# Patient Record
Sex: FEMALE | Race: WHITE | NOT HISPANIC OR LATINO | Employment: OTHER | URBAN - METROPOLITAN AREA
[De-identification: names, ages, dates, MRNs, and addresses within clinical notes are randomized per-mention and may not be internally consistent; named-entity substitution may affect disease eponyms.]

---

## 2017-03-13 DIAGNOSIS — I10 ESSENTIAL HYPERTENSION WITH GOAL BLOOD PRESSURE LESS THAN 140/90: ICD-10-CM

## 2017-03-13 RX ORDER — LISINOPRIL/HYDROCHLOROTHIAZIDE 10-12.5 MG
TABLET ORAL
Qty: 90 TABLET | Refills: 1 | Status: SHIPPED | OUTPATIENT
Start: 2017-03-13 | End: 2017-09-27

## 2017-03-13 NOTE — TELEPHONE ENCOUNTER
Prescription approved per [facility].  Pretty Everett RN

## 2017-03-13 NOTE — TELEPHONE ENCOUNTER
LISINOPRIL-HYDROCHLORO 10-12.5 TABS      Last Written Prescription Date: 06/29/2016  Last Fill Quantity: 90, # refills: 1  Last Office Visit with [facility], P or [facility] prescribing provider: 11/02/2016       Potassium   Date Value Ref Range Status   06/29/2016 4.1 3.4 - 5.3 mmol/L Final     Creatinine   Date Value Ref Range Status   06/29/2016 0.56 0.52 - 1.04 mg/dL Final     BP Readings from Last 3 Encounters:   12/10/16 94/72   11/02/16 132/82   08/03/16 120/74

## 2017-05-03 DIAGNOSIS — I10 ESSENTIAL HYPERTENSION WITH GOAL BLOOD PRESSURE LESS THAN 140/90: ICD-10-CM

## 2017-05-03 RX ORDER — LISINOPRIL/HYDROCHLOROTHIAZIDE 10-12.5 MG
TABLET ORAL
Qty: 90 TABLET | Refills: 1 | OUTPATIENT
Start: 2017-05-03

## 2017-06-13 ENCOUNTER — ALLIED HEALTH/NURSE VISIT (OUTPATIENT)
Dept: FAMILY MEDICINE | Facility: CLINIC | Age: 52
End: 2017-06-13
Payer: COMMERCIAL

## 2017-06-13 ENCOUNTER — TELEPHONE (OUTPATIENT)
Dept: FAMILY MEDICINE | Facility: CLINIC | Age: 52
End: 2017-06-13

## 2017-06-13 VITALS — DIASTOLIC BLOOD PRESSURE: 84 MMHG | SYSTOLIC BLOOD PRESSURE: 138 MMHG

## 2017-06-13 DIAGNOSIS — E80.6 ACQUIRED HYPERBILIRUBINEMIA: ICD-10-CM

## 2017-06-13 DIAGNOSIS — Z13.6 CARDIOVASCULAR SCREENING; LDL GOAL LESS THAN 130: ICD-10-CM

## 2017-06-13 DIAGNOSIS — D50.9 IRON DEFICIENCY ANEMIA, UNSPECIFIED IRON DEFICIENCY ANEMIA TYPE: ICD-10-CM

## 2017-06-13 DIAGNOSIS — K70.0 ALCOHOLIC FATTY LIVER: ICD-10-CM

## 2017-06-13 DIAGNOSIS — R74.8 ABNORMAL LIVER ENZYMES: Chronic | ICD-10-CM

## 2017-06-13 DIAGNOSIS — I10 ESSENTIAL HYPERTENSION WITH GOAL BLOOD PRESSURE LESS THAN 140/90: Primary | ICD-10-CM

## 2017-06-13 DIAGNOSIS — F10.10 AA (ALCOHOL ABUSE): ICD-10-CM

## 2017-06-13 PROCEDURE — 99207 ZZC NO CHARGE NURSE ONLY: CPT | Performed by: FAMILY MEDICINE

## 2017-06-13 RX ORDER — LISINOPRIL 10 MG/1
10 TABLET ORAL DAILY
Qty: 30 TABLET | Refills: 1 | Status: SHIPPED | OUTPATIENT
Start: 2017-06-13 | End: 2017-09-27

## 2017-06-13 NOTE — MR AVS SNAPSHOT
After Visit Summary   6/13/2017    Abiola Matute    MRN: [id number]           Patient Information     Date Of Birth          1965        Visit Information        Provider Department      6/13/2017 5:42 PM Philly Goncalves MD [facility]        Today's Diagnoses     Essential hypertension with goal blood pressure less than 140/90    -  1       Follow-ups after your visit        Who to contact     If you have questions or need follow up information about today's clinic visit or your schedule please contact [facility] directly at [phone].  Normal or non-critical lab and imaging results will be communicated to you by Advebshart, letter or phone within 4 business days after the clinic has received the results. If you do not hear from us within 7 days, please contact the clinic through Advebshart or phone. If you have a critical or abnormal lab result, we will notify you by phone as soon as possible.  Submit refill requests through August or call your pharmacy and they will forward the refill request to us. Please allow 3 business days for your refill to be completed.          Additional Information About Your Visit        MyChart Information     August gives you secure access to your electronic health record. If you see a primary care provider, you can also send messages to your care team and make appointments. If you have questions, please call your primary care clinic.  If you do not have a primary care provider, please call [phone] and they will assist you.        Care EveryWhere ID     This is your Care EveryWhere ID. This could be used by other organizations to access your [place] medical records  [id number]        Your Vitals Were     Last Period                   05/31/2006            Blood Pressure from Last 3 Encounters:   06/13/17 138/84   12/10/16 94/72   11/02/16 132/82    Weight from Last 3 Encounters:   11/02/16 193 lb 1 oz (87.6 kg)    08/03/16 192 lb (87.1 kg)   07/29/16 191 lb (86.6 kg)              Today, you had the following     No orders found for display       Primary Care Provider Office Phone # Fax #    Philly Goncalves [phone]       [facility] [address] [address]        Thank you!     Thank you for choosing [facility]  for your care. Our goal is always to provide you with excellent care. Hearing back from our patients is one way we can continue to improve our services. Please take a few minutes to complete the written survey that you may receive in the mail after your visit with us. Thank you!             Your Updated Medication List - Protect others around you: Learn how to safely use, store and throw away your medicines at www.disposemymeds.org.          This list is accurate as of: 6/13/17  5:44 PM.  Always use your most recent med list.                   Brand Name Dispense Instructions for use    CALCIUM 500 PO      Take 1 tablet by mouth daily       cephALEXin 500 MG capsule    KEFLEX    10 capsule    Take 1 capsule (500 mg) by mouth 2 times daily       cholecalciferol 1000 UNIT tablet    vitamin D     Take 1 tablet by mouth daily.       EPINEPHrine 0.3 MG/0.3ML injection     2 each    Inject 0.3 mLs (0.3 mg) into the muscle once as needed       lisinopril-hydrochlorothiazide 10-12.5 MG per tablet    PRINZIDE/ZESTORETIC    90 tablet    TAKE ONE TABLET BY MOUTH EVERY DAY       LORazepam 0.5 MG tablet    ATIVAN    10 tablet    Take 1 tablet by mouth. 1-2 tabs 30-60 minutes prior to exam/procedure/flight       MULTIVITAMIN & MINERAL PO      Take  by mouth daily.          Aldair Kennedy

## 2017-06-13 NOTE — NURSING NOTE
Abiola Matute is enrolled/participating in the retail pharmacy Blood Pressure Goals Achievement Program (BPGAP).  Abiola Matute was evaluated at [facility] on June 13, 2017 at which time her blood pressure was:    BP Readings from Last 3 Encounters:   06/13/17 138/84   12/10/16 94/72   11/02/16 132/82     Reviewed lifestyle modifications for blood pressure control and reduction: including making healthy food choices, managing weight, getting regular exercise, smoking cessation, reducing alcohol consumption, monitoring blood pressure regularly.     Abiola Matute is not experiencing symptoms.    Follow-Up: BP is at goal of < 140/90mmHg (patient 18+ years of age with or without diabetes).  Recommended follow-up at pharmacy in 6 months.     Recommendation to Provider: Continue current regimen    Abiola Matute was evaluated for enrollment into the PGEN study today.    Patient eligible for enrollment:  No  Patient interested in enrollment:  No    Completed by: Thank you,  Joyce Rivas RPh, Mgr [facility] [place] [phone]

## 2017-06-14 NOTE — TELEPHONE ENCOUNTER
Have pt then stay with her current dosage and lisinopril 10mg/hctz12.5 take daily  - Recheck your blood pressure in our pharmacy in 1 week or sooner if needed.  Have pharmacy send me their note.      Needs to be on her meds and taking them for best interpretation.  Hold on the additional 10mg lisinopril for now.

## 2017-06-14 NOTE — TELEPHONE ENCOUNTER
BP Readings from Last 6 Encounters:   06/13/17 138/84   12/10/16 94/72   11/02/16 132/82   08/03/16 120/74   07/29/16 124/80   06/29/16 132/82     bp not entirely well controlled. Recommend increasing lisinopril to 20mg and keeping the HCTZ to 12.5mg.    Add 10mg lisinopril to pt's current lisinopril 10mg/hctz 12.5mg combination and Recheck your blood pressure in our pharmacy in 1 week or sooner if needed.  Have pharmacy send me their note again.     Also bp may be up secondary to alcohol overuse.  Please ask pt about current alcohol use.  Also Pt never came back in for liver functions last summer - I've re-ordered them. Please assist pt in making appt to be seen for lab only visit and also assist pt in making appt for annual exam as well.

## 2017-06-15 NOTE — TELEPHONE ENCOUNTER
Attempted to call patient.  Received patients voicemail.  Left a detailed message with details below.  Advised to call back and speak with any triage nurse with any questions or concerns.     Nereida Samson RN    [facility]

## 2017-08-17 ENCOUNTER — TRANSFERRED RECORDS (OUTPATIENT)
Dept: HEALTH INFORMATION MANAGEMENT | Facility: CLINIC | Age: 52
End: 2017-08-17

## 2017-09-27 ENCOUNTER — OFFICE VISIT (OUTPATIENT)
Dept: FAMILY MEDICINE | Facility: CLINIC | Age: 52
End: 2017-09-27
Payer: COMMERCIAL

## 2017-09-27 ENCOUNTER — TELEPHONE (OUTPATIENT)
Dept: FAMILY MEDICINE | Facility: CLINIC | Age: 52
End: 2017-09-27

## 2017-09-27 VITALS
DIASTOLIC BLOOD PRESSURE: 88 MMHG | TEMPERATURE: 98.6 F | BODY MASS INDEX: 33.11 KG/M2 | WEIGHT: 206 LBS | HEART RATE: 89 BPM | OXYGEN SATURATION: 100 % | SYSTOLIC BLOOD PRESSURE: 136 MMHG | HEIGHT: 66 IN

## 2017-09-27 DIAGNOSIS — Z12.11 SCREEN FOR COLON CANCER: ICD-10-CM

## 2017-09-27 DIAGNOSIS — Z23 NEED FOR PROPHYLACTIC VACCINATION AND INOCULATION AGAINST INFLUENZA: ICD-10-CM

## 2017-09-27 DIAGNOSIS — Z13.6 CARDIOVASCULAR SCREENING; LDL GOAL LESS THAN 130: ICD-10-CM

## 2017-09-27 DIAGNOSIS — Z00.00 ENCOUNTER FOR ROUTINE ADULT HEALTH EXAMINATION WITHOUT ABNORMAL FINDINGS: Primary | ICD-10-CM

## 2017-09-27 DIAGNOSIS — Z12.31 VISIT FOR SCREENING MAMMOGRAM: ICD-10-CM

## 2017-09-27 DIAGNOSIS — I10 ESSENTIAL HYPERTENSION WITH GOAL BLOOD PRESSURE LESS THAN 140/90: ICD-10-CM

## 2017-09-27 LAB
BASOPHILS # BLD AUTO: 0 10E9/L (ref 0–0.2)
BASOPHILS NFR BLD AUTO: 0.5 %
DIFFERENTIAL METHOD BLD: ABNORMAL
EOSINOPHIL # BLD AUTO: 0.1 10E9/L (ref 0–0.7)
EOSINOPHIL NFR BLD AUTO: 2.4 %
ERYTHROCYTE [DISTWIDTH] IN BLOOD BY AUTOMATED COUNT: 13.3 % (ref 10–15)
HCT VFR BLD AUTO: 38.4 % (ref 35–47)
HGB BLD-MCNC: 13.3 G/DL (ref 11.7–15.7)
LYMPHOCYTES # BLD AUTO: 1.3 10E9/L (ref 0.8–5.3)
LYMPHOCYTES NFR BLD AUTO: 21.5 %
MCH RBC QN AUTO: 36.1 PG (ref 26.5–33)
MCHC RBC AUTO-ENTMCNC: 34.6 G/DL (ref 31.5–36.5)
MCV RBC AUTO: 104 FL (ref 78–100)
MONOCYTES # BLD AUTO: 0.5 10E9/L (ref 0–1.3)
MONOCYTES NFR BLD AUTO: 8.7 %
NEUTROPHILS # BLD AUTO: 3.9 10E9/L (ref 1.6–8.3)
NEUTROPHILS NFR BLD AUTO: 66.9 %
PLATELET # BLD AUTO: 71 10E9/L (ref 150–450)
RBC # BLD AUTO: 3.68 10E12/L (ref 3.8–5.2)
WBC # BLD AUTO: 5.9 10E9/L (ref 4–11)

## 2017-09-27 PROCEDURE — 80050 GENERAL HEALTH PANEL: CPT | Performed by: PHYSICIAN ASSISTANT

## 2017-09-27 PROCEDURE — 99396 PREV VISIT EST AGE 40-64: CPT | Performed by: PHYSICIAN ASSISTANT

## 2017-09-27 PROCEDURE — 83036 HEMOGLOBIN GLYCOSYLATED A1C: CPT | Performed by: PHYSICIAN ASSISTANT

## 2017-09-27 PROCEDURE — 82043 UR ALBUMIN QUANTITATIVE: CPT | Performed by: PHYSICIAN ASSISTANT

## 2017-09-27 PROCEDURE — 36415 COLL VENOUS BLD VENIPUNCTURE: CPT | Performed by: PHYSICIAN ASSISTANT

## 2017-09-27 PROCEDURE — 80061 LIPID PANEL: CPT | Performed by: PHYSICIAN ASSISTANT

## 2017-09-27 RX ORDER — LISINOPRIL 10 MG/1
10 TABLET ORAL DAILY
Qty: 90 TABLET | Refills: 1 | Status: SHIPPED | OUTPATIENT
Start: 2017-09-27 | End: 2018-02-26

## 2017-09-27 NOTE — NURSING NOTE
"Chief Complaint   Patient presents with     Physical       Initial /88 (BP Location: Right arm, Patient Position: Chair, Cuff Size: Adult Large)  Pulse 89  Temp 98.6  F (37  C) (Oral)  Ht 5' 6\" (1.676 m)  Wt 206 lb (93.4 kg)  LMP 05/31/2006  SpO2 100%  Breastfeeding? No  BMI 33.25 kg/m2 Estimated body mass index is 33.25 kg/(m^2) as calculated from the following:    Height as of this encounter: 5' 6\" (1.676 m).    Weight as of this encounter: 206 lb (93.4 kg).  Medication Reconciliation: complete   Csaba Mlnarik CMA    "

## 2017-09-27 NOTE — TELEPHONE ENCOUNTER
Date Forms was received: September 27, 2017    Forms received by: Patient Drop Off    Last office visit: 09/27/2017    Purpose of Form:  Biometric forms    When the form is due:  asap    How the form needs to be returned for patient:  Patient  -- call pt - needs to sign yet    Form currently placed  Csaba's desk - [facility]

## 2017-09-27 NOTE — PROGRESS NOTES
SUBJECTIVE:   CC: Abiola Matute is an 52 year old woman who presents for preventive health visit.     Healthy Habits:    Do you get at least three servings of calcium containing foods daily (dairy, green leafy vegetables, etc.)? yes    Amount of exercise or daily activities, outside of work: 3-4 day(s) per week - 20-30 minute walk    Problems taking medications regularly No    Medication side effects: No    Have you had an eye exam in the past two years? yes    Do you see a dentist twice per year? yes    Do you have sleep apnea, excessive snoring or daytime drowsiness?no        Hypertension Follow-up      Outpatient blood pressures are being checked at [facility].  Results are 138/84.    Low Salt Diet: no added salt        PHQ-2  0-0  Today's PHQ-2 Score: PHQ-2 ( 1999 Pf[employer]) 8/3/2016 6/27/2016   Q1: Little interest or pleasure in doing things 0 -   Q2: Feeling down, depressed or hopeless 0 -   PHQ-2 Score 0 -   Q1: Little interest or pleasure in doing things - Not at all   Q2: Feeling down, depressed or hopeless - Not at all   PHQ-2 Score - 0         Abuse: Current or Past(Physical, Sexual or Emotional)- No  Do you feel safe in your environment - Yes  Social History   Substance Use Topics     Smoking status: Never Smoker     Smokeless tobacco: Never Used     Alcohol use 0.0 oz/week     0 Standard drinks or equivalent per week      Comment: occ.          Standardized Alcohol Screening Questionnaire  AUDIT   Questions 0 1 2 3 4 Score   1. How often do you have a drink  containing alcohol? Never Monthly or less 2 to 4  times a  month 2 to 3  times a  week 4 or more  times a  week  3   2. How many drinks containing alcohol  do you have on a typical day when you are drinking? 1 or 2 3 or 4 5 or 6 7 to 9 10 or more  1   3. How often do you have more than five  or more drinks on one occasion? Never Less  than  monthly Monthly Weekly Daily or  almost  daily  1   4. How often during the last year have  you  found that you were not able to stop drinking once you had started? Never Less  than  monthly Monthly Weekly Daily or  almost  daily  0   5. How often during the last year have  you failed to do what was normally expected of you because of drinking? Never Less  than  monthly Monthly Weekly Daily or  almost  daily  0   6. How often during the last year have  you needed a first drink in the morning to get yourself going after a heavy drinking session? Never Less  than  monthly Monthly Weekly Daily or  almost  daily  0   7. How often during the last year have you had a feeling of guilt or remorse after drinking? Never Less  than  monthly Monthly Weekly Daily or  almost  daily  1   8. How often during the last year have  you been unable to remember what happened the night before because of your drinking? Never Less  than  monthly Monthly Weekly Daily or  almost  daily  0   9. Have you or someone else been  injured because of your drinking? No  Yes, but not in the last year  Yes,  during the  last year  0   10. Has a relative, friend, doctor or other health care worker been concerned about your drinking or suggested you cut down? No  Yes, but not in the last year  Yes,  during the  last year  0   Total  6   Scoring: A score of 7 for adult men is an indication of hazardous drinking (risk for physical or physiological harm); a score of 8 or more is an indication of an alcohol use disorder. A score of 5 or more for adult women  is an indication of hazardous drinking or an alcohol use disorder.     Patient reports that she drinks about once on the weekend and then about one day during the week.      Reviewed orders with patient.  Reviewed health maintenance and updated orders accordingly - Yes  Labs reviewed in [facility]    Patient over age 50, mutual decision to screen reflected in health maintenance.  Patient gets mammograms every year.  She will have this scheduled today.        Pertinent mammograms are reviewed under the  "imaging tab.  History of abnormal Pap smear: Status post benign hysterectomy. Health Maintenance and Surgical History updated.    Reviewed and updated as needed this visit by clinical staff         Reviewed and updated as needed this visit by Provider              ROS:  C: NEGATIVE for fever, chills, change in weight  I: NEGATIVE for worrisome rashes, moles or lesions  E: NEGATIVE for vision changes or irritation  ENT: NEGATIVE for ear, mouth and throat problems  R: NEGATIVE for significant cough or SOB  B: NEGATIVE for masses, tenderness or discharge  CV: NEGATIVE for chest pain, palpitations or peripheral edema  GI: NEGATIVE for nausea, abdominal pain, heartburn, or change in bowel habits  : NEGATIVE for unusual urinary or vaginal symptoms. No vaginal bleeding.  M: NEGATIVE for significant arthralgias or myalgia  N: NEGATIVE for weakness, dizziness or paresthesias  P: NEGATIVE for changes in mood or affect     OBJECTIVE:   /88 (BP Location: Right arm, Patient Position: Chair, Cuff Size: Adult Large)  Pulse 89  Temp 98.6  F (37  C) (Oral)  Ht 5' 6\" (1.676 m)  Wt 206 lb (93.4 kg)  LMP 05/31/2006  SpO2 100%  Breastfeeding? No  BMI 33.25 kg/m2  EXAM:  GENERAL: healthy, alert and no distress  EYES: Eyes grossly normal to inspection, PERRL and conjunctivae and sclerae normal  HENT: ear canals and TM's normal, nose and mouth without ulcers or lesions  NECK: no adenopathy, no asymmetry, masses, or scars and thyroid normal to palpation  RESP: lungs clear to auscultation - no rales, rhonchi or wheezes  BREAST: normal without masses, tenderness or nipple discharge and no palpable axillary masses or adenopathy  CV: regular rate and rhythm, normal S1 S2, no S3 or S4, no murmur, click or rub, no peripheral edema and peripheral pulses strong  ABDOMEN: soft, nontender, no hepatosplenomegaly, no masses and bowel sounds normal  MS: no gross musculoskeletal defects noted, no edema  SKIN: no suspicious lesions or " "rashes  NEURO: Normal strength and tone, mentation intact and speech normal  PSYCH: mentation appears normal, affect normal/bright    ASSESSMENT/PLAN:       ICD-10-CM    1. Encounter for routine adult health examination without abnormal findings Z00.00 Albumin Random Urine Quantitative with Creat Ratio     Lipid panel reflex to direct LDL     CBC with platelets and differential     Comprehensive metabolic panel (BMP + Alb, Alk Phos, ALT, AST, Total. Bili, TP)     TSH with free T4 reflex     Hemoglobin A1c   2. Screen for colon cancer Z12.11 Fecal colorectal cancer screen FIT - Future (S+30)   3. Visit for screening mammogram Z12.31 MA SCREENING DIGITAL BILAT - Future  (s+30)   4. Need for prophylactic vaccination and inoculation against influenza Z23    5. CARDIOVASCULAR SCREENING; LDL GOAL LESS THAN 130 Z13.6    6. Essential hypertension with goal blood pressure less than 140/90 I10 Albumin Random Urine Quantitative with Creat Ratio     lisinopril (PRINIVIL/ZESTRIL) 10 MG tablet       COUNSELING:   Reviewed preventive health counseling, as reflected in patient instructions         reports that she has never smoked. She has never used smokeless tobacco.    Estimated body mass index is 31.16 kg/(m^2) as calculated from the following:    Height as of 11/2/16: 5' 6\" (1.676 m).    Weight as of 11/2/16: 193 lb 1 oz (87.6 kg).   Weight management plan: Discussed healthy diet and exercise guidelines and patient will follow up in 12 months in clinic to re-evaluate.    Counseling Resources:  ATP IV Guidelines  Pooled Cohorts Equation Calculator  Breast Cancer Risk Calculator  FRAX Risk Assessment  ICSI Preventive Guidelines  Dietary Guidelines for Americans, 2010  USDA's MyPlate  ASA Prophylaxis  Lung CA Screening    Amber Castro PA-C  [facility]  "

## 2017-09-27 NOTE — MR AVS SNAPSHOT
After Visit Summary   9/27/2017    Abiola Matute    MRN: [id number]           Patient Information     Date Of Birth          1965        Visit Information        Provider Department      9/27/2017 3:20 PM Amber Castro PA-C [facility] [place]        Today's Diagnoses     Encounter for routine adult health examination without abnormal findings    -  1    Screen for colon cancer        Visit for screening mammogram        Need for prophylactic vaccination and inoculation against influenza        CARDIOVASCULAR SCREENING; LDL GOAL LESS THAN 130        Essential hypertension with goal blood pressure less than 140/90          Care Instructions      Preventive Health Recommendations  Female Ages 50 - 64    Yearly exam: See your health care provider every year in order to  o Review health changes.   o Discuss preventive care.    o Review your medicines if your doctor has prescribed any.      Get a Pap test every three years (unless you have an abnormal result and your provider advises testing more often).    If you get Pap tests with HPV test, you only need to test every 5 years, unless you have an abnormal result.     You do not need a Pap test if your uterus was removed (hysterectomy) and you have not had cancer.    You should be tested each year for STDs (sexually transmitted diseases) if you're at risk.     Have a mammogram every 1 to 2 years.    Have a colonoscopy at age 50, or have a yearly FIT test (stool test). These exams screen for colon cancer.      Have a cholesterol test every 5 years, or more often if advised.    Have a diabetes test (fasting glucose) every three years. If you are at risk for diabetes, you should have this test more often.     If you are at risk for osteoporosis (brittle bone disease), think about having a bone density scan (DEXA).    Shots: Get a flu shot each year. Get a tetanus shot every 10 years.    Nutrition:     Eat at least 5 servings of fruits and  vegetables each day.    Eat whole-grain bread, whole-wheat pasta and brown rice instead of white grains and rice.    Talk to your provider about Calcium and Vitamin D.     Lifestyle    Exercise at least 150 minutes a week (30 minutes a day, 5 days a week). This will help you control your weight and prevent disease.    Limit alcohol to one drink per day.    No smoking.     Wear sunscreen to prevent skin cancer.     See your dentist every six months for an exam and cleaning.    See your eye doctor every 1 to 2 years.            Follow-ups after your visit        Future tests that were ordered for you today     Open Future Orders        Priority Expected Expires Ordered    MA SCREENING DIGITAL BILAT - Future  (s+30) Routine  9/27/2018 9/27/2017    Fecal colorectal cancer screen FIT - Future (S+30) Routine 10/18/2017 10/27/2017 9/27/2017            Who to contact     If you have questions or need follow up information about today's clinic visit or your schedule please contact [facility] directly at [phone].  Normal or non-critical lab and imaging results will be communicated to you by finalsitehart, letter or phone within 4 business days after the clinic has received the results. If you do not hear from us within 7 days, please contact the clinic through Greytip Software or phone. If you have a critical or abnormal lab result, we will notify you by phone as soon as possible.  Submit refill requests through Greytip Software or call your pharmacy and they will forward the refill request to us. Please allow 3 business days for your refill to be completed.          Additional Information About Your Visit        Greytip Software Information     Greyt[employer] Software gives you secure access to your electronic health record. If you see a primary care provider, you can also send messages to your care team and make appointments. If you have questions, please call your primary care clinic.  If you do not have a primary care provider, please call  "[phone] and they will assist you.        Care EveryWhere ID     This is your Care EveryWhere ID. This could be used by other organizations to access your [place] medical records  [id number]        Your Vitals Were     Pulse Temperature Height Last Period Pulse Oximetry Breastfeeding?    89 98.6  F (37  C) (Oral) 5' 6\" (1.676 m) 05/31/2006 100% No    BMI (Body Mass Index)                   33.25 kg/m2            Blood Pressure from Last 3 Encounters:   09/27/17 136/88   06/13/17 138/84   12/10/16 94/72    Weight from Last 3 Encounters:   09/27/17 206 lb (93.4 kg)   11/02/16 193 lb 1 oz (87.6 kg)   08/03/16 192 lb (87.1 kg)              We Performed the Following     Albumin Random Urine Quantitative with Creat Ratio     CBC with platelets and differential     Comprehensive metabolic panel (BMP + Alb, Alk Phos, ALT, AST, Total. Bili, TP)     Lipid panel reflex to direct LDL     TSH with free T4 reflex          Where to get your medicines      These medications were sent to [facility] [place] [address]     Phone:  [phone]     lisinopril 10 MG tablet          Primary Care Provider Office Phone # Fax #    Philly Goncalves [phone]       [address] [place] [address]        Equal Access to Services     WES CHAN AH: Hadii leydi morrisseyo Sotoryali, waaxda luqadaha, qaybta kaalmada adeegyada, hudson ogden. So [facility] [phone].    ATENCIÓN: Si habla español, tiene a knight disposición servicios gratuitos de asistencia lingüística. Riley al [phone].    We comply with applicable federal civil rights laws and [place] laws. We do not discriminate on the basis of race, color, national origin, age, disability sex, sexual orientation or gender identity.            Thank you!     Thank you for choosing [facility]  for your care. Our goal is " always to provide you with excellent care. Hearing back from our patients is one way we can continue to improve our services. Please take a few minutes to complete the written survey that you may receive in the mail after your visit with us. Thank you!             Your Updated Medication List - Protect others around you: Learn how to safely use, store and throw away your medicines at www.disposemymeds.org.          This list is accurate as of: 9/27/17  3:56 PM.  Always use your most recent med list.                   Brand Name Dispense Instructions for use Diagnosis    CALCIUM 500 PO      Take 1 tablet by mouth daily    Screening for osteoporosis       cholecalciferol 1000 UNIT tablet    vitamin D     Take 1 tablet by mouth daily.    Screening for osteoporosis       lisinopril 10 MG tablet    PRINIVIL/ZESTRIL    90 tablet    Take 1 tablet (10 mg) by mouth daily    Essential hypertension with goal blood pressure less than 140/90       LORazepam 0.5 MG tablet    ATIVAN    10 tablet    Take 1 tablet by mouth. 1-2 tabs 30-60 minutes prior to exam/procedure/flight    Claustrophobia       MULTIVITAMIN & MINERAL PO      Take  by mouth daily.    Routine general medical examination at a health care facility, Iron deficiency anemia, unspecified

## 2017-09-28 LAB
ALBUMIN SERPL-MCNC: 3.4 G/DL (ref 3.4–5)
ALP SERPL-CCNC: 158 U/L (ref 40–150)
ALT SERPL W P-5'-P-CCNC: 64 U/L (ref 0–50)
ANION GAP SERPL CALCULATED.3IONS-SCNC: 13 MMOL/L (ref 3–14)
AST SERPL W P-5'-P-CCNC: 128 U/L (ref 0–45)
BILIRUB SERPL-MCNC: 3.4 MG/DL (ref 0.2–1.3)
BUN SERPL-MCNC: 7 MG/DL (ref 7–30)
CALCIUM SERPL-MCNC: 8.7 MG/DL (ref 8.5–10.1)
CHLORIDE SERPL-SCNC: 91 MMOL/L (ref 94–109)
CHOLEST SERPL-MCNC: 119 MG/DL
CO2 SERPL-SCNC: 22 MMOL/L (ref 20–32)
CREAT SERPL-MCNC: 0.55 MG/DL (ref 0.52–1.04)
CREAT UR-MCNC: 20 MG/DL
GFR SERPL CREATININE-BSD FRML MDRD: >90 ML/MIN/1.7M2
GLUCOSE SERPL-MCNC: 89 MG/DL (ref 70–99)
HDLC SERPL-MCNC: 43 MG/DL
LDLC SERPL CALC-MCNC: 62 MG/DL
MICROALBUMIN UR-MCNC: <5 MG/L
MICROALBUMIN/CREAT UR: NORMAL MG/G CR (ref 0–25)
NONHDLC SERPL-MCNC: 76 MG/DL
POTASSIUM SERPL-SCNC: 4.1 MMOL/L (ref 3.4–5.3)
PROT SERPL-MCNC: 8.3 G/DL (ref 6.8–8.8)
SODIUM SERPL-SCNC: 126 MMOL/L (ref 133–144)
TRIGL SERPL-MCNC: 70 MG/DL
TSH SERPL DL<=0.005 MIU/L-ACNC: 2 MU/L (ref 0.4–4)

## 2017-09-29 DIAGNOSIS — E87.1 SODIUM BLOOD DECREASED: ICD-10-CM

## 2017-09-29 DIAGNOSIS — R74.8 ELEVATED LIVER ENZYMES: Primary | ICD-10-CM

## 2017-09-29 LAB — HBA1C MFR BLD: 4.6 % (ref 4.3–6)

## 2017-09-29 NOTE — TELEPHONE ENCOUNTER
Needed Hgb A1c to complete form -- will use tube drawn from 09/27/2017.    Pt needs to sign form. Pt will stop in to sign. Brought form to  -- pt will leave for us to complete and fax for her.    Heber Wynn CMA

## 2017-09-29 NOTE — TELEPHONE ENCOUNTER
Form signed by pt and provider. Added A1c.    Faxed to [phone] and sent to [email or web address].    Heber Wynn CMA

## 2017-09-30 NOTE — PROGRESS NOTES
Note to staff: Please call the patient to explain results.    The results from your recent lab work show that the liver enzymes are elevated, as they have been in the past as well.  This can often be due to alcohol intake, and therefore limiting alcohol intake to less than 7 drinks a week or less is recommended.    We should re-check a liver panel in about one week.       Also, the sodium level is low.  Low sodium can be happen when you are over hydrated or with vomiting, and therefore we should check this lab again when you are not fasting and are well nourished.  Low sodium can be damaging to the brain and cause fluid retention in your legs and abdomen.  Please be sure you are not drinking more than 60 oz of free water in a day as too much water can contribute to lower sodium levels as well.    -TSH (thyroid stimulating hormone) level is normal which indicates normal thyroid function.  -A1C (diabetic test) is normal and indicates that your blood sugar has been in a normal range the last 3 months.  -Microalbumin (urine protein) test is normal.  ADVISE: recheck annually    Please be sure to make a lab only appointment within one week for blood work and a urine sample.  We will re-check the labs described above.      Please followup sooner if needed.      Thank you for choosing [facility] for your health care needs,      Amber Castro PA-C

## 2017-10-11 ENCOUNTER — RADIANT APPOINTMENT (OUTPATIENT)
Dept: MAMMOGRAPHY | Facility: CLINIC | Age: 52
End: 2017-10-11
Payer: COMMERCIAL

## 2017-10-11 DIAGNOSIS — Z12.31 VISIT FOR SCREENING MAMMOGRAM: ICD-10-CM

## 2017-10-11 PROCEDURE — G0202 SCR MAMMO BI INCL CAD: HCPCS | Mod: TC

## 2017-10-13 NOTE — PROGRESS NOTES
Please call pt with results below:     Need to schedule diagnostic mammo and US - Give number for [facility].

## 2017-10-19 ENCOUNTER — HOSPITAL ENCOUNTER (OUTPATIENT)
Dept: MAMMOGRAPHY | Facility: CLINIC | Age: 52
Discharge: HOME | End: 2017-10-19
Attending: FAMILY MEDICINE | Admitting: FAMILY MEDICINE
Payer: COMMERCIAL

## 2017-10-19 ENCOUNTER — HOSPITAL ENCOUNTER (OUTPATIENT)
Dept: ULTRASOUND IMAGING | Facility: CLINIC | Age: 52
End: 2017-10-19
Attending: FAMILY MEDICINE
Payer: COMMERCIAL

## 2017-10-19 DIAGNOSIS — R92.8 ABNORMAL MAMMOGRAM: ICD-10-CM

## 2017-10-19 PROCEDURE — G0279 TOMOSYNTHESIS, MAMMO: HCPCS

## 2017-10-19 PROCEDURE — 76642 ULTRASOUND BREAST LIMITED: CPT | Mod: LT

## 2017-11-02 DIAGNOSIS — E87.1 SODIUM BLOOD DECREASED: ICD-10-CM

## 2017-11-02 DIAGNOSIS — R74.8 ELEVATED LIVER ENZYMES: ICD-10-CM

## 2017-11-02 PROCEDURE — 82977 ASSAY OF GGT: CPT | Performed by: PHYSICIAN ASSISTANT

## 2017-11-02 PROCEDURE — 36415 COLL VENOUS BLD VENIPUNCTURE: CPT | Performed by: PHYSICIAN ASSISTANT

## 2017-11-02 PROCEDURE — 80048 BASIC METABOLIC PNL TOTAL CA: CPT | Performed by: PHYSICIAN ASSISTANT

## 2017-11-02 PROCEDURE — 80076 HEPATIC FUNCTION PANEL: CPT | Performed by: PHYSICIAN ASSISTANT

## 2017-11-02 PROCEDURE — 84300 ASSAY OF URINE SODIUM: CPT | Performed by: PHYSICIAN ASSISTANT

## 2017-11-03 LAB
ALBUMIN SERPL-MCNC: 3.2 G/DL (ref 3.4–5)
ALP SERPL-CCNC: 149 U/L (ref 40–150)
ALT SERPL W P-5'-P-CCNC: 48 U/L (ref 0–50)
ANION GAP SERPL CALCULATED.3IONS-SCNC: 11 MMOL/L (ref 3–14)
AST SERPL W P-5'-P-CCNC: 73 U/L (ref 0–45)
BILIRUB DIRECT SERPL-MCNC: 1 MG/DL (ref 0–0.2)
BILIRUB SERPL-MCNC: 1.9 MG/DL (ref 0.2–1.3)
BUN SERPL-MCNC: 6 MG/DL (ref 7–30)
CALCIUM SERPL-MCNC: 8.5 MG/DL (ref 8.5–10.1)
CHLORIDE SERPL-SCNC: 103 MMOL/L (ref 94–109)
CO2 SERPL-SCNC: 23 MMOL/L (ref 20–32)
CREAT SERPL-MCNC: 0.58 MG/DL (ref 0.52–1.04)
GFR SERPL CREATININE-BSD FRML MDRD: >90 ML/MIN/1.7M2
GGT SERPL-CCNC: 311 U/L (ref 0–40)
GLUCOSE SERPL-MCNC: 100 MG/DL (ref 70–99)
POTASSIUM SERPL-SCNC: 4.1 MMOL/L (ref 3.4–5.3)
PROT SERPL-MCNC: 7.9 G/DL (ref 6.8–8.8)
SODIUM SERPL-SCNC: 137 MMOL/L (ref 133–144)
SODIUM UR-SCNC: 30 MMOL/L

## 2017-11-08 DIAGNOSIS — E80.6 ACQUIRED HYPERBILIRUBINEMIA: ICD-10-CM

## 2017-11-08 DIAGNOSIS — R17 ELEVATED BILIRUBIN: Primary | ICD-10-CM

## 2017-11-08 DIAGNOSIS — K70.0 ALCOHOLIC FATTY LIVER: ICD-10-CM

## 2017-11-08 DIAGNOSIS — F10.10 AA (ALCOHOL ABUSE): ICD-10-CM

## 2017-11-08 NOTE — PROGRESS NOTES
Note to staff: Please call the patient to explain results.    The results from your recent lab work show that the liver panel is elevated.  It appears to be slightly more elevated from the numbers that were done last year with Dr. Goncalves.      This may be due to alcohol intake, and therefore I strongly encouraged you to work on stopping alcohol all together.  Please let us know if you want help accessing support groups or programs for this.      As Dr Goncalves also suggested last year, I think we should do an ultrasound of the liver to further evaluate it.  Also, followup with GI is advised as well.        I have placed orders for the abdominal ultrasound, and the [facility] radiology should be calling you to set this up.  If they don't you can call them at - [facility] radiology scheduling [phone] or [phone] to get this scheduled.     The referral for gastroenterology has been done as well.  Please also work in scheduling this appointment.      Please let us know if we can help you access any resources or get appointments scheduled.      Please followup with any questions or concerns.  Pleas seek more immediate medical attention if needed.     Thank you for choosing [facility] for your health care needs,      Amber Castro PA-C

## 2018-02-26 ENCOUNTER — OFFICE VISIT (OUTPATIENT)
Dept: FAMILY MEDICINE | Facility: CLINIC | Age: 53
End: 2018-02-26
Payer: COMMERCIAL

## 2018-02-26 VITALS
HEART RATE: 105 BPM | SYSTOLIC BLOOD PRESSURE: 136 MMHG | BODY MASS INDEX: 32.54 KG/M2 | WEIGHT: 202.5 LBS | TEMPERATURE: 98.8 F | OXYGEN SATURATION: 98 % | HEIGHT: 66 IN | DIASTOLIC BLOOD PRESSURE: 86 MMHG

## 2018-02-26 DIAGNOSIS — N30.01 ACUTE CYSTITIS WITH HEMATURIA: Primary | ICD-10-CM

## 2018-02-26 DIAGNOSIS — R82.90 NONSPECIFIC FINDING ON EXAMINATION OF URINE: ICD-10-CM

## 2018-02-26 DIAGNOSIS — R30.0 DYSURIA: ICD-10-CM

## 2018-02-26 DIAGNOSIS — I10 ESSENTIAL HYPERTENSION WITH GOAL BLOOD PRESSURE LESS THAN 140/90: ICD-10-CM

## 2018-02-26 LAB
ALBUMIN UR-MCNC: >=300 MG/DL
APPEARANCE UR: ABNORMAL
BACTERIA #/AREA URNS HPF: ABNORMAL /HPF
BILIRUB UR QL STRIP: ABNORMAL
COLOR UR AUTO: YELLOW
GLUCOSE UR STRIP-MCNC: NEGATIVE MG/DL
HGB UR QL STRIP: ABNORMAL
KETONES UR STRIP-MCNC: NEGATIVE MG/DL
LEUKOCYTE ESTERASE UR QL STRIP: ABNORMAL
NITRATE UR QL: NEGATIVE
NON-SQ EPI CELLS #/AREA URNS LPF: ABNORMAL /LPF
PH UR STRIP: 7 PH (ref 5–7)
RBC #/AREA URNS AUTO: >100 /HPF
SOURCE: ABNORMAL
SP GR UR STRIP: 1.01 (ref 1–1.03)
UROBILINOGEN UR STRIP-ACNC: 1 EU/DL (ref 0.2–1)
WBC #/AREA URNS AUTO: >100 /HPF

## 2018-02-26 PROCEDURE — 87186 SC STD MICRODIL/AGAR DIL: CPT | Performed by: PHYSICIAN ASSISTANT

## 2018-02-26 PROCEDURE — 99214 OFFICE O/P EST MOD 30 MIN: CPT | Performed by: PHYSICIAN ASSISTANT

## 2018-02-26 PROCEDURE — 87088 URINE BACTERIA CULTURE: CPT | Performed by: PHYSICIAN ASSISTANT

## 2018-02-26 PROCEDURE — 81001 URINALYSIS AUTO W/SCOPE: CPT | Performed by: PHYSICIAN ASSISTANT

## 2018-02-26 PROCEDURE — 87086 URINE CULTURE/COLONY COUNT: CPT | Performed by: PHYSICIAN ASSISTANT

## 2018-02-26 RX ORDER — CEPHALEXIN 500 MG/1
500 CAPSULE ORAL 2 TIMES DAILY
Qty: 14 CAPSULE | Refills: 0 | Status: SHIPPED | OUTPATIENT
Start: 2018-02-26 | End: 2018-03-05

## 2018-02-26 RX ORDER — LISINOPRIL 10 MG/1
10 TABLET ORAL DAILY
Qty: 90 TABLET | Refills: 1 | Status: SHIPPED | OUTPATIENT
Start: 2018-02-26 | End: 2018-09-17

## 2018-02-26 NOTE — NURSING NOTE
"Chief Complaint   Patient presents with     Urinary Problem     dysuria started yesterday afternoon        Initial /86  Pulse 105  Temp 98.8  F (37.1  C) (Tympanic)  Ht 5' 6\" (1.676 m)  Wt 202 lb 8 oz (91.9 kg)  LMP 05/31/2006  SpO2 98%  BMI 32.68 kg/m2 Estimated body mass index is 32.68 kg/(m^2) as calculated from the following:    Height as of this encounter: 5' 6\" (1.676 m).    Weight as of this encounter: 202 lb 8 oz (91.9 kg).  Medication Reconciliation: complete    "

## 2018-02-26 NOTE — PROGRESS NOTES
SUBJECTIVE:   Abiola Matute is a 52 year old female who presents to clinic today for the following health issues:    Dysuria  Abiola presents to clinic reporting symptoms of dysuria and urinary urgency starting yesterday afternoon. She also reports a fever of 102 measured at home yesterday. Since her symptoms onset she states that she has been pushing fluids which has minimally alleviated her symptoms. She reports that she wipes from front to back and urinates following intercourse. She denies bowel symptoms, history of kidney stones, and flank pain. Of note she was treated with ciprofloxacin for a UTI at [facility] clinic in August of 2017.    Problem list and histories reviewed & adjusted, as indicated.  Additional history: as documented    Patient Active Problem List   Diagnosis     Non morbid obesity due to excess calories     Other isolated or specific phobias     Other congenital malformations of mouth     Contact dermatitis and other eczema, due to unspecified cause     Intestinal infection due to Clostridium difficile     Iron deficiency anemia, unspecified iron deficiency anemia type     Rosacea     Atypical ductal hyperplasia of left breast     Idiopathic thrombocytopenia (H)     Postmenopausal- s/p hysterectomy with BSO - not on HRT secondary to atypical ductal hyperplasia & breast pain -no paps needed     Claustrophobia     S/P total hysterectomy and bilateral salpingo-oophorectomy     Abnormal liver enzymes- ? related to ongoing etoh use/abuse     Essential hypertension with goal blood pressure less than 140/90     Gastroenteritis     Stool incontinence     CARDIOVASCULAR SCREENING; LDL GOAL LESS THAN 130     AA (alcohol abuse) - ? ongoing      Alcoholic fatty liver     Acquired hyperbilirubinemia- ? secondary to alcohol use     Diffuse nodular cirrhosis of liver (H)     Past Surgical History:   Procedure Laterality Date     BREAST BIOPSY, RT/LT  9/17/2010    left - scheduled with Dr. Varma       C APPENDECTOMY  at age 14     COLONOSCOPY  2006     COLONOSCOPY N/A 12/23/2014    Procedure: COMBINED COLONOSCOPY, SINGLE OR MULTIPLE BIOPSY/POLYPECTOMY BY BIOPSY;  Surgeon: Diane Fleming MD;  Location: [facility]     HC CLOSED TX BIMALLEOLAR ANKLE FX W/O MANIPULATION  at age 27    left ankle ORIF, plates and screws removed at age 36     HYSTERECTOMY, VAGINAL  2006    with Dr. Licha Zhou - with BSO for fibroids      SURGICAL HISTORY OF -   4/15/2010    Pelviscopy with removal of bilateral hydrosalpinges.        Social History   Substance Use Topics     Smoking status: Never Smoker     Smokeless tobacco: Never Used     Alcohol use 0.0 oz/week     0 Standard drinks or equivalent per week      Comment: 10-12 drinks per week     Family History   Problem Relation Age of Onset     Breast Cancer Mother      GASTROINTESTINAL DISEASE Mother      HEART DISEASE Father 57     HEART DISEASE Paternal Grandfather      Hypertension Maternal Grandmother      DIABETES Paternal Grandmother      DIABETES Maternal Grandfather      CANCER Sister          Current Outpatient Prescriptions   Medication Sig Dispense Refill     cephALEXin (KEFLEX) 500 MG capsule Take 1 capsule (500 mg) by mouth 2 times daily for 7 days 14 capsule 0     lisinopril (PRINIVIL/ZESTRIL) 10 MG tablet Take 1 tablet (10 mg) by mouth daily 90 tablet 1     LORazepam (ATIVAN) 0.5 MG tablet Take 1 tablet by mouth. 1-2 tabs 30-60 minutes prior to exam/procedure/flight 10 tablet 0     Multiple Vitamins-Minerals (MULTIVITAMIN & MINERAL PO) Take  by mouth daily.       Calcium Carbonate (CALCIUM 500 PO) Take 1 tablet by mouth daily        cholecalciferol (VITAMIN D) 1000 UNIT tablet Take 1 tablet by mouth daily.       [DISCONTINUED] lisinopril (PRINIVIL/ZESTRIL) 10 MG tablet Take 1 tablet (10 mg) by mouth daily 90 tablet 1     Allergies   Allergen Reactions     Fish Oil      Redness and itching around eye area only - went away when fish oil capsules stopped       "Metronidazole      pain/itching     Naphthalenemethylamines      Lamisil = mild urticarial reaction     Ppd [Tuberculin Purified Protein Derivative]      Sulfa Drugs      hives       Reviewed and updated as needed this visit by clinical staff  Tobacco  Allergies  Meds  Problems  Med Hx  Surg Hx  Fam Hx  Soc Hx        Reviewed and updated as needed this visit by Provider  Tobacco  Allergies  Meds  Problems  Med Hx  Surg Hx  Fam Hx  Soc Hx          ROS:  Constitutional, HEENT, cardiovascular, pulmonary, GI, , musculoskeletal, neuro, skin, endocrine and psych systems are negative, except as otherwise noted.    This document serves as a record of the services and decisions personally performed and made by Lacy Mckeon PA-C. It was created on her behalf by Horacio Mccann, a trained medical scribe. The creation of this document is based on the provider's statements to the medical scribe.  Horacio Mccann 3:56 PM February 26, 2018    OBJECTIVE:   /86  Pulse 105  Temp 98.8  F (37.1  C) (Tympanic)  Ht 5' 6\" (1.676 m)  Wt 202 lb 8 oz (91.9 kg)  LMP 05/31/2006  SpO2 98%  BMI 32.68 kg/m2  Body mass index is 32.68 kg/(m^2).  GENERAL: healthy, alert and no distress  RESP: lungs clear to auscultation - no rales, rhonchi or wheezes  CV: regular rate and rhythm, normal S1 S2, no S3 or S4, no murmur, click or rub, no peripheral edema and peripheral pulses strong  ABDOMEN: soft, nontender, no hepatosplenomegaly, no masses and bowel sounds normal  SKIN: no suspicious lesions or rashes  PSYCH: mentation appears normal, affect normal/bright    Diagnostic Test Results:  Results for orders placed or performed in visit on 02/26/18 (from the past 24 hour(s))   *UA reflex to Microscopic and Culture ([place] and [facility] (except [place] and [place])   Result Value Ref Range    Color Urine Yellow     Appearance Urine Cloudy     Glucose Urine Negative NEG^Negative mg/dL    Bilirubin Urine Small " (A) NEG^Negative    Ketones Urine Negative NEG^Negative mg/dL    Specific Gravity Urine 1.015 1.003 - 1.035    Blood Urine Large (A) NEG^Negative    pH Urine 7.0 5.0 - 7.0 pH    Protein Albumin Urine >=300 (A) NEG^Negative mg/dL    Urobilinogen Urine 1.0 0.2 - 1.0 EU/dL    Nitrite Urine Negative NEG^Negative    Leukocyte Esterase Urine Large (A) NEG^Negative    Source Midstream Urine    Urine Microscopic   Result Value Ref Range    WBC Urine >100 (A) OTO2^O - 2 /HPF    RBC Urine >100 (A) OTO2^O - 2 /HPF    Squamous Epithelial /LPF Urine Few FEW^Few /LPF    Bacteria Urine Few (A) NEG^Negative /HPF       ASSESSMENT/PLAN:   Abiola was seen today for urinary problem.    Diagnoses and all orders for this visit:    Acute cystitis with hematuria, Dysuria, Nonspecific finding on examination of urine  Start cephalexin to treat cystitis. Will monitor culture results and change antibiotic if needed. Encouraged patient to continue to push fluids to flush any bacteria from her urinary symptoms. Follow up if symptoms persist or worsen.  -     cephALEXin (KEFLEX) 500 MG capsule; Take 1 capsule (500 mg) by mouth 2 times daily for 7 days  -     *UA reflex to Microscopic and Culture ([place] and [facility] (except [place] and [place])  -     Urine Microscopic  -     Urine Culture Aerobic Bacterial    Essential hypertension with goal blood pressure less than 140/90  Stable, refill provided for lisinopril.   -     lisinopril (PRINIVIL/ZESTRIL) 10 MG tablet; Take 1 tablet (10 mg) by mouth daily    The information in this document, created by the medical scribe for me, accurately reflects the services I personally performed and the decisions made by me. I have reviewed and approved this document for accuracy prior to leaving the patient care area.  February 26, 2018 3:56 PM    Lacy Mckeon PA-C  [facility] [place]

## 2018-02-26 NOTE — MR AVS SNAPSHOT
After Visit Summary   2/26/2018    Abiola Matute    MRN: [id number]           Patient Information     Date Of Birth          1965        Visit Information        Provider Department      2/26/2018 3:40 PM Lacy Mckeon PA-C [facility]        Today's Diagnoses     Acute cystitis with hematuria    -  1    Dysuria        Nonspecific finding on examination of urine        Essential hypertension with goal blood pressure less than 140/90           Follow-ups after your visit        Who to contact     If you have questions or need follow up information about today's clinic visit or your schedule please contact [facility] [facility] [employer] directly at [phone].  Normal or non-critical lab and imaging results will be communicated to you by [employer] [employer], letter or phone within 4 business days after the clinic has received the results. If you do not hear from us within 7 days, please contact the clinic through [employer] or phone. If you have a critical or abnormal lab result, we will notify you by phone as soon as possible.  Submit refill requests through [facility] or call your pharmacy and they will forward the refill request to us. Please allow 3 business days for your refill to be completed.          Additional Information About Your Visit        MyChart Information     [facility] gives you secure access to your electronic health record. If you see a primary care provider, you can also send messages to your care team and make appointments. If you have questions, please call your primary care clinic.  If you do not have a primary care provider, please call [phone] and they will assist you.        Care EveryWhere ID     This is your Care EveryWhere ID. This could be used by other organizations to access your [facility] medical records  [id number]        Your Vitals Were     Pulse Temperature Height Last Period Pulse Oximetry BMI (Body Mass Index)    105 98.8  F (37.1  C) (Tympanic) 5'  "6\" (1.676 m) 05/31/2006 98% 32.68 kg/m2       Blood Pressure from Last 3 Encounters:   02/26/18 136/86   09/27/17 136/88   06/13/17 138/84    Weight from Last 3 Encounters:   02/26/18 202 lb 8 oz (91.9 kg)   09/27/17 206 lb (93.4 kg)   11/02/16 193 lb 1 oz (87.6 kg)              We Performed the Following     *UA reflex to Microscopic and Culture ([place] and [facility] (except [place] and [place])     Urine Culture Aerobic Bacterial     Urine Microscopic          Today's Medication Changes          These changes are accurate as of 2/26/18  4:19 PM.  If you have any questions, ask your nurse or doctor.               Start taking these medicines.        Dose/Directions    cephALEXin 500 MG capsule   Commonly known as:  KEFLEX   Used for:  Acute cystitis with hematuria   Started by:  Lacy Mckeon PA-C        Dose:  500 mg   Take 1 capsule (500 mg) by mouth 2 times daily for 7 days   Quantity:  14 capsule   Refills:  0            Where to get your medicines      These medications were sent to [facility] [place] [id number]     Phone:  [phone]     cephALEXin 500 MG capsule    lisinopril 10 MG tablet                Primary Care Provider Office Phone # Fax #    Phillyanjel Goncalves [phone]       [address] [place] [id number]        Equal Access to Services     [facility] DUSTIN : Hadii leydi ku hadasho Soomaali, waaxda luqadaha, qaybta kaalmada adeegyada, waxdavid fabiana ogden. So [facility] [phone].    ATENCIÓN: Si habla español, tiene a knight disposición servicios gratuitos de asistencia lingüística. Llame al [phone].    We comply with applicable federal civil rights laws and [place] laws. We do not discriminate on the basis of race, color, national origin, age, disability, sex, sexual orientation, or gender identity.            Thank you!     Thank you for " choosing [facility]  for your care. Our goal is always to provide you with excellent care. Hearing back from our patients is one way we can continue to improve our services. Please take a few minutes to complete the written survey that you may receive in the mail after your visit with us. Thank you!             Your Updated Medication List - Protect others around you: Learn how to safely use, store and throw away your medicines at www.disposemymeds.org.          This list is accurate as of 2/26/18  4:19 PM.  Always use your most recent med list.                   Brand Name Dispense Instructions for use Diagnosis    CALCIUM 500 PO      Take 1 tablet by mouth daily    Screening for osteoporosis       cephALEXin 500 MG capsule    KEFLEX    14 capsule    Take 1 capsule (500 mg) by mouth 2 times daily for 7 days    Acute cystitis with hematuria       cholecalciferol 1000 UNIT tablet    vitamin D3     Take 1 tablet by mouth daily.    Screening for osteoporosis       lisinopril 10 MG tablet    PRINIVIL/ZESTRIL    90 tablet    Take 1 tablet (10 mg) by mouth daily    Essential hypertension with goal blood pressure less than 140/90       LORazepam 0.5 MG tablet    ATIVAN    10 tablet    Take 1 tablet by mouth. 1-2 tabs 30-60 minutes prior to exam/procedure/flight    Claustrophobia       MULTIVITAMIN & MINERAL PO      Take  by mouth daily.    Routine general medical examination at a health care facility, Iron deficiency anemia, unspecified

## 2018-02-27 LAB
BACTERIA SPEC CULT: ABNORMAL
SPECIMEN SOURCE: ABNORMAL

## 2018-02-28 ENCOUNTER — TELEPHONE (OUTPATIENT)
Dept: FAMILY MEDICINE | Facility: CLINIC | Age: 53
End: 2018-02-28

## 2018-02-28 DIAGNOSIS — N30.01 ACUTE CYSTITIS WITH HEMATURIA: Primary | ICD-10-CM

## 2018-02-28 RX ORDER — NITROFURANTOIN 25; 75 MG/1; MG/1
100 CAPSULE ORAL 2 TIMES DAILY
Qty: 14 CAPSULE | Refills: 0 | Status: SHIPPED | OUTPATIENT
Start: 2018-02-28 | End: 2018-09-01

## 2018-02-28 RX ORDER — NITROFURANTOIN 25; 75 MG/1; MG/1
100 CAPSULE ORAL 2 TIMES DAILY
Qty: 14 CAPSULE | Refills: 0 | Status: SHIPPED | OUTPATIENT
Start: 2018-02-28 | End: 2018-02-28

## 2018-02-28 NOTE — TELEPHONE ENCOUNTER
Pt calling asking if the [facility] [place] to be [facility]     [facility]  [place] and [facility] - so the [facility] can fill antibiotic    Stacy Byrne RN, BSN  [facility]

## 2018-02-28 NOTE — TELEPHONE ENCOUNTER
Reason for call:  Patient reporting a symptoms of UTI    Symptom or request: After four days the drug azucena patel RX is clearly not working, is there another drug she could try.    Duration (how long have symptoms been present): four days    Have you been treated for this before? Yes    Additional comments: [facility] [place] if an additional RX is called in.  Near her work.    Phone Number patient can be reached at:  Cell number on file:    Telephone Information:   Mobile [phone]       Best Time:  Anytime today    Can we leave a detailed message on this number:  YES    Call taken on 2/28/2018 at 8:54 AM by Suad Spence

## 2018-02-28 NOTE — TELEPHONE ENCOUNTER
[facility] [place] is where the pt would like this to go.    Changed pharmacy and reordered.    The patient indicates understanding of these issues and agrees with the plan.  Pretty Everett RN  [facility] [place]

## 2018-02-28 NOTE — PROGRESS NOTES
[place]  I have reviewed your recent labs. Here are the results:    -Urine culture is abnormal and grew out bacteria that are sensitive to the antibiotic you have been given.  However, it appears that you are not having relief from your phone call to the clinic today.  I have sent in an alternate antibiotic to your [facility] pharmacy that will be twice daily x 7 days.  Please make sure you are drinking plenty of fluids on this medication.  Complete the medication as prescribed and if you experience new, worsening or persistent symptoms, you should call or return for a recheck.       If you have any questions please do not hesitate to contact our office via phone ([phone]) or MyChart.    Lacy Mckeon, MS, PA-C  [facility]

## 2018-02-28 NOTE — TELEPHONE ENCOUNTER
Sent new antibiotic to [facility] in [place].  The culture shows that the old medication should have worked well, but due to her persistent symptoms we will change this RX.  If not improving, RTC.      Lacy Mckeon MS, PA-C

## 2018-08-29 ENCOUNTER — TELEPHONE (OUTPATIENT)
Dept: FAMILY MEDICINE | Facility: CLINIC | Age: 53
End: 2018-08-29

## 2018-08-29 NOTE — TELEPHONE ENCOUNTER
Reason for call:  Patient reporting a symptom    Symptom or request: hemorrhoid and bm changes    Duration (how long have symptoms been present): hemorrhoids have been a long time the bm changes have been a couple weeks    Have you been treated for this before? No    Additional comments: Pt would like to talk to a nurse about her sx    Phone Number patient can be reached at:  Home number on file [phone] (home)    Best Time:      Can we leave a detailed message on this number:  YES    Call taken on 8/29/2018 at 10:52 AM by Pretty Perea

## 2018-08-29 NOTE — TELEPHONE ENCOUNTER
"Returned patient call. Patient has both internal and external hemorrhoids and feels like a \"skin tag\".  Incontinent of bowels for past 2-3 weeks, not daily, off and on describes them as leaks, randomly occuring, can happen with walking, coughing or just any time.      Patient describes it being difficult when she has a normal BM.  Has a small amount of bright red blood on tissue.  There are times when pain will interfere with activities.  Patient describes hard lumps around rectal opening.  Using preparation H for temporary relief.     Denies severe persistent rectal bleeding, fever, severe pain, diabetes, redness, drainage, bleeding in absence of stool, streaks of blood on stool surface.    Advised patient on home cares of sitz bath several times a day, clean rectal area with soft wipes, apply cold compress and zinc oxide or witch hazel, OTC medications like preparation H, avoid sitting, standing, lifting, or straining, increase water intake, fruits, vegetables.  An appointment was made for 9/1/2018 concerning the hard lumps around rectal opening.    Susan Molina RN  [employer] [facility]    "

## 2018-09-01 ENCOUNTER — OFFICE VISIT (OUTPATIENT)
Dept: FAMILY MEDICINE | Facility: CLINIC | Age: 53
End: 2018-09-01
Payer: COMMERCIAL

## 2018-09-01 VITALS
TEMPERATURE: 98.9 F | HEIGHT: 66 IN | SYSTOLIC BLOOD PRESSURE: 128 MMHG | OXYGEN SATURATION: 97 % | DIASTOLIC BLOOD PRESSURE: 76 MMHG | WEIGHT: 194 LBS | HEART RATE: 95 BPM | BODY MASS INDEX: 31.18 KG/M2

## 2018-09-01 DIAGNOSIS — K64.4 EXTERNAL HEMORRHOIDS: ICD-10-CM

## 2018-09-01 DIAGNOSIS — K60.2 ANAL FISSURE: Primary | ICD-10-CM

## 2018-09-01 PROCEDURE — 99213 OFFICE O/P EST LOW 20 MIN: CPT | Performed by: NURSE PRACTITIONER

## 2018-09-01 NOTE — PROGRESS NOTES
"  SUBJECTIVE:   Abiola Matute is a 53 year old female who presents to clinic today for the following health issues:    Pt states no new or worsening symptoms since she spoke to RN triage yesterday - see note below.      She was seen 9/2010 for evaluation of hemorrhoids. This note was reviewed and underwent Dennison ligature in the office before this note.  She was suppose to go back in 8 weeks to re-assess.    She has had some constipation.     No other concerns noted today.   She declines weight loss, body aches, tarry stools, bright red stools that fill the toilet (just notes bright red blood when wiping).   Only occasional bright red blood when wiping.  Has only tried preparation H.    No diarrhea or abdominal pain      Colonoscopy in 2014, due again 2024.  \"Findings:        The perianal exam revealed skin tags. Digital examination revealed        tenderness but no abnormality.        The distal ileum appeared normal. Biopsies were taken with a cold        forceps for histology.        The colon (entire examined portion) appeared normal. Biopsies were taken        with a cold forceps from the ascending colon, transverse colon,        descending colon and sigmoid colon for evaluation of microscopic colitis.                                                                                     Impression:          - Perianal skin tags found on perianal exam.                        - The examined portion of the ileum was normal. Biopsied.                        - The entire examined colon is normal. Biopsied.   Recommendation:      - Resume diet and usual medications.                        - Repeat colonoscopy in 10 years for screening purposes.                        - Await pathology results.                                                                                       YUDELKA Fleming M.D.\"      RN triage noted is  Below from yesterday -   \"Phone Encounter:  Returned patient call. Patient has both internal and " "external hemorrhoids and feels like a \"skin tag\".  Incontinent of bowels for past 2-3 weeks, not daily, off and on describes them as leaks, randomly occuring, can happen with walking, coughing or just any time.       Patient describes it being difficult when she has a normal BM.  Has a small amount of bright red blood on tissue.  There are times when pain will interfere with activities.  Patient describes hard lumps around rectal opening.  Using preparation H for temporary relief.      Denies severe persistent rectal bleeding, fever, severe pain, diabetes, redness, drainage, bleeding in absence of stool, streaks of blood on stool surface.     Advised patient on home cares of sitz bath several times a day, clean rectal area with soft wipes, apply cold compress and zinc oxide or witch hazel, OTC medications like preparation H, avoid sitting, standing, lifting, or straining, increase water intake, fruits, vegetables.  An appointment was made for 9/1/2018 concerning the hard lumps around rectal opening.    Susan Molina RN  [employer] [facility]\"      Problem list and histories reviewed & adjusted, as indicated.  Additional history: as documented    Patient Active Problem List   Diagnosis     Non morbid obesity due to excess calories     Other isolated or specific phobias     Other congenital malformations of mouth     Contact dermatitis and other eczema, due to unspecified cause     Intestinal infection due to Clostridium difficile     Iron deficiency anemia, unspecified iron deficiency anemia type     Rosacea     Atypical ductal hyperplasia of left breast     Idiopathic thrombocytopenia (H)     Postmenopausal- s/p hysterectomy with BSO - not on HRT secondary to atypical ductal hyperplasia & breast pain -no paps needed     Claustrophobia     S/P total hysterectomy and bilateral salpingo-oophorectomy     Abnormal liver enzymes- ? related to ongoing etoh use/abuse     Essential hypertension with goal blood pressure " less than 140/90     Gastroenteritis     Stool incontinence     CARDIOVASCULAR SCREENING; LDL GOAL LESS THAN 130     AA (alcohol abuse) - ? ongoing      Alcoholic fatty liver     Acquired hyperbilirubinemia- ? secondary to alcohol use     Diffuse nodular cirrhosis of liver (H)     Past Surgical History:   Procedure Laterality Date     BREAST BIOPSY, RT/LT  9/17/2010    left - scheduled with Dr. Kojo WILKERSON APPENDECTOMY  at age 14     COLONOSCOPY  2006     COLONOSCOPY N/A 12/23/2014    Procedure: COMBINED COLONOSCOPY, SINGLE OR MULTIPLE BIOPSY/POLYPECTOMY BY BIOPSY;  Surgeon: Diane Fleming MD;  Location: [facility]     HC CLOSED TX BIMALLEOLAR ANKLE FX W/O MANIPULATION  at age 27    left ankle ORIF, plates and screws removed at age 36     HYSTERECTOMY, VAGINAL  2006    with Dr. Licha Zhou - with BSO for fibroids      SURGICAL HISTORY OF -   4/15/2010    Pelviscopy with removal of bilateral hydrosalpinges.        Social History   Substance Use Topics     Smoking status: Never Smoker     Smokeless tobacco: Never Used     Alcohol use 0.0 oz/week     0 Standard drinks or equivalent per week      Comment: 10-12 drinks per week     Family History   Problem Relation Age of Onset     Breast Cancer Mother      GASTROINTESTINAL DISEASE Mother      HEART DISEASE Father 57     HEART DISEASE Paternal Grandfather      Hypertension Maternal Grandmother      Diabetes Paternal Grandmother      Diabetes Maternal Grandfather      Cancer Sister          Current Outpatient Prescriptions   Medication Sig Dispense Refill     Calcium Carbonate (CALCIUM 500 PO) Take 1 tablet by mouth daily        cholecalciferol (VITAMIN D) 1000 UNIT tablet Take 1 tablet by mouth daily.       lisinopril (PRINIVIL/ZESTRIL) 10 MG tablet Take 1 tablet (10 mg) by mouth daily 90 tablet 1     LORazepam (ATIVAN) 0.5 MG tablet Take 1 tablet by mouth. 1-2 tabs 30-60 minutes prior to exam/procedure/flight 10 tablet 0     Multiple Vitamins-Minerals  "(MULTIVITAMIN & MINERAL PO) Take  by mouth daily.       Allergies   Allergen Reactions     Fish Oil      Redness and itching around eye area only - went away when fish oil capsules stopped      Metronidazole      pain/itching     Naphthalenemethylamines      Lamisil = mild urticarial reaction     Ppd [Tuberculin Purified Protein Derivative]      Sulfa Drugs      hives     Recent Labs   Lab Test  11/02/17   1609  09/27/17   1559  09/27/17   0902  07/14/16   0829  06/29/16   1128  09/30/14   1101   A1C   --    --   4.6   --    --    --    LDL   --   62   --    --   80  71   HDL   --   43*   --    --   38*  60   TRIG   --   70   --    --   67  67   ALT  48  64*   --   74*  46  65*   CR  0.58  0.55   --    --   0.56  0.62   GFRESTIMATED  >90  >90   --    --   >90  Non  GFR Calc    >90  Non  GFR Calc     GFRESTBLACK  >90  >90   --    --   >90   GFR Calc    >90   GFR Calc     POTASSIUM  4.1  4.1   --    --   4.1  3.6   TSH   --   2.00   --    --   2.28   --       BP Readings from Last 3 Encounters:   09/01/18 128/76   02/26/18 136/86   09/27/17 136/88    Wt Readings from Last 3 Encounters:   09/01/18 194 lb (88 kg)   02/26/18 202 lb 8 oz (91.9 kg)   09/27/17 206 lb (93.4 kg)              Labs reviewed in EPIC    Reviewed and updated as needed this visit by clinical staff  Tobacco  Allergies  Meds  Med Hx       Reviewed and updated as needed this visit by Provider         ROS:  Constitutional, cardiovascular, pulmonary, gi and gu systems are negative, except as otherwise noted.    OBJECTIVE:     /76  Pulse 95  Temp 98.9  F (37.2  C) (Oral)  Ht 5' 6\" (1.676 m)  Wt 194 lb (88 kg)  LMP 05/31/2006  SpO2 97%  BMI 31.31 kg/m2  Body mass index is 31.31 kg/(m^2).  GENERAL: healthy, alert and no distress  RECTAL (female): sphincter tone normal, no masses, multiple hemorrhoids noted - non-thrombosed and anal fissure noted at 5 o'clock.   Rectal " examination performed - no masses noted   PSYCH: mentation appears normal, affect normal/bright    Diagnostic Test Results:  none     ASSESSMENT/PLAN:   Abiola was seen today for hemorrhoids.    Diagnoses and all orders for this visit:    Anal fissure  -     COLORECTAL SURGERY REFERRAL    External hemorrhoids  -     COLORECTAL SURGERY REFERRAL        See Patient Instructions  Referral given to colorectal surgery for further evaluation - patient has seen colorectal before  Patient information sheet given on hemorrhoids and anal fissure  Reviewed with Dr. Dupree, who agreed with plan of care  Will have RN triage call patient to check-in on her and see if she has further questions on Tuesday  Patient is aware of s/s requiring urgent evaluation - rectal bleeding, abdominal pain, worsening pain, fever, chills, etc  Return to clinic if no improvement or symptoms worsen.  Patient verbalized understanding & agreed with plan of care.    DARYN Murillo, CNP  [facility] [place]

## 2018-09-01 NOTE — PATIENT INSTRUCTIONS
Hemorrhoids   What are hemorrhoids?   Hemorrhoids are swollen veins and tissue in the lower rectum and anus. The anus is at the end of the rectum and is the opening through which bowel movements pass from your body. Hemorrhoids are a common problem. Another name for them is piles.   Hemorrhoids may be internal (inside the rectum) or external (around the anus). Internal hemorrhoids are often painless but they sometimes cause a lot of bleeding. The internal veins may stretch and even fall out (prolapse) through the anus to outside the body. The veins may then become irritated and painful. External hemorrhoids can be seen or felt easily around the anal opening. When the swollen veins are scratched or broken by straining, rubbing, or wiping, they sometimes bleed.   How do they occur?   Veins in the rectum and around the anus tend to swell under pressure. Hemorrhoids can result from too much pressure on these veins. You may put pressure on these veins by:     straining to have a bowel movement when you are constipated     waiting too long to have a bowel movement     sitting for a long time on the toilet, which causes strain on the anal area     coughing and sneezing often     sitting for a long while.   Hemorrhoids may also develop from:     diarrhea     obesity     injury to the anus, for example, from anal intercourse     some liver diseases.   Flare-ups of hemorrhoids may occur during periods of stress. Some people inherit a tendency to have hemorrhoids.   Pregnant women should try to avoid becoming constipated because they are more likely to have hemorrhoids during pregnancy. In the last trimester of pregnancy, the enlarged uterus may press on blood vessels and cause hemorrhoids. Also, the strain of childbirth sometimes causes hemorrhoids after the birth.   What are the symptoms?   Symptoms of hemorrhoids include:     itching, mild burning, and bleeding around the anus (for example, you might see bright  red blood on toilet paper after wiping)     swelling and tenderness around the anus     pain with bowel movements     painful lumps around the anus ranging in size from a pea to a walnut (in severe cases).   How are they diagnosed?   Your healthcare provider will examine your rectum and anus. Your provider may use a special small light called a proctoscope or anoscope to look inside the rectum.   How is it treated?   The following treatments usually help to relieve most cases of hemorrhoids:     High-fiber diet   Eat more high-fiber foods, which will help prevent constipation. The best sources of fiber are whole-grain cereals, such as shredded wheat or cereals with bran. Fresh fruit and raw or cooked vegetables, especially asparagus, cabbage, carrots, corn, and broccoli are other good sources of fiber.     Fluids   Drink plenty of water. This helps to soften bowel movements so they are easier to pass.     Sitz baths and cold packs   Sitting in lukewarm water 2 or 3 times a day for 15 minutes cleans the anal area and may relieve discomfort. (If the bath water is too hot, swelling around the anus will get worse.) Also, you might try putting a cloth-covered ice pack on the anus for 10 minutes, 4 times a day.     Medications   For mild discomfort, your healthcare provider may prescribe a cream or ointment for the painful area. The cream may contain witch hazel, zinc oxide, or petroleum jelly. Your provider may also prescribe medicated suppositories to put inside the rectum.     Procedures and surgeries   A number of procedures can be used to remove or shrink hemorrhoids. If you have painful, protruding internal hemorrhoids, your healthcare provider can do a procedure called hemorrhoid banding. Your provider will put a tight band around the enlarged vein and either cut the hemorrhoid open, remove any blood clots, and let the vein heal, or let the hemorrhoid dry up and fall off. This method is effective in most cases.  Other methods include destroying the hemorrhoid with freezing, electrical or laser heat, or infrared light. Or your provider may shrink the hemorrhoid by injecting a chemical around the swollen vein.   For severe cases of hemorrhoids, a surgical procedure called a hemorrhoidectomy may be done. For this procedure you are first given an anesthetic to prevent you from feeling pain. Then your surgeon removes the hemorrhoids.   How long will the effects last?   Usually hemorrhoids do not pose a danger to your health. In most cases the symptoms go away in a few days. The painful lumps of more severe cases should get better in a couple of weeks.   How can I take care of myself?   Always tell your healthcare provider when you have rectal bleeding. Although bleeding may be from hemorrhoids, more serious illnesses, such as colon cancer, can also cause bleeding.   Follow these guidelines to help prevent hemorrhoids and to relieve their discomfort:     Do not strain during bowel movements. The straining makes hemorrhoids swell.     Follow your high-fiber diet and drink plenty of water. If necessary, take a stool softener, such as Estrella's M-O, psyllium, Metamucil or Citrucel, or mineral oil. Softer stools make it easier to empty the bowels and reduce pressure on the veins.     Don't overuse laxatives. Diarrhea can be as irritating to the anus as constipation.     Ask your healthcare provider what nonprescription product you should buy to relieve pain and itching. Also, ask about any side effects of any medications prescribed for you.     Exercise regularly to help prevent constipation.     Avoid a lot of wiping after a bowel movement if you have hemorrhoids. Wiping with soft, moist toilet paper (or a commercial moist pad or baby wipe) may relieve discomfort. If necessary, shower instead of wiping, then dry the anus gently.

## 2018-09-01 NOTE — MR AVS SNAPSHOT
After Visit Summary   9/1/2018    Abiola Matute    MRN: [id number]           Patient Information     Date Of Birth          1965        Visit Information        Provider Department      9/1/2018 8:20 AM Cori Escamilla APRN [facility]        Today's Diagnoses     Anal fissure    -  1    External hemorrhoids          Care Instructions                Hemorrhoids   What are hemorrhoids?   Hemorrhoids are swollen veins and tissue in the lower rectum and anus. The anus is at the end of the rectum and is the opening through which bowel movements pass from your body. Hemorrhoids are a common problem. Another name for them is piles.   Hemorrhoids may be internal (inside the rectum) or external (around the anus). Internal hemorrhoids are often painless but they sometimes cause a lot of bleeding. The internal veins may stretch and even fall out (prolapse) through the anus to outside the body. The veins may then become irritated and painful. External hemorrhoids can be seen or felt easily around the anal opening. When the swollen veins are scratched or broken by straining, rubbing, or wiping, they sometimes bleed.   How do they occur?   Veins in the rectum and around the anus tend to swell under pressure. Hemorrhoids can result from too much pressure on these veins. You may put pressure on these veins by:     straining to have a bowel movement when you are constipated     waiting too long to have a bowel movement     sitting for a long time on the toilet, which causes strain on the anal area     coughing and sneezing often     sitting for a long while.   Hemorrhoids may also develop from:     diarrhea     obesity     injury to the anus, for example, from anal intercourse     some liver diseases.   Flare-ups of hemorrhoids may occur during periods of stress. Some people inherit a tendency to have hemorrhoids.   Pregnant women should try to avoid becoming constipated because they  are more likely to have hemorrhoids during pregnancy. In the last trimester of pregnancy, the enlarged uterus may press on blood vessels and cause hemorrhoids. Also, the strain of childbirth sometimes causes hemorrhoids after the birth.   What are the symptoms?   Symptoms of hemorrhoids include:     itching, mild burning, and bleeding around the anus (for example, you might see bright red blood on toilet paper after wiping)     swelling and tenderness around the anus     pain with bowel movements     painful lumps around the anus ranging in size from a pea to a walnut (in severe cases).   How are they diagnosed?   Your healthcare provider will examine your rectum and anus. Your provider may use a special small light called a proctoscope or anoscope to look inside the rectum.   How is it treated?   The following treatments usually help to relieve most cases of hemorrhoids:     High-fiber diet   Eat more high-fiber foods, which will help prevent constipation. The best sources of fiber are whole-grain cereals, such as shredded wheat or cereals with bran. Fresh fruit and raw or cooked vegetables, especially asparagus, cabbage, carrots, corn, and broccoli are other good sources of fiber.     Fluids   Drink plenty of water. This helps to soften bowel movements so they are easier to pass.     Sitz baths and cold packs   Sitting in lukewarm water 2 or 3 times a day for 15 minutes cleans the anal area and may relieve discomfort. (If the bath water is too hot, swelling around the anus will get worse.) Also, you might try putting a cloth-covered ice pack on the anus for 10 minutes, 4 times a day.     Medications   For mild discomfort, your healthcare provider may prescribe a cream or ointment for the painful area. The cream may contain witch hazel, zinc oxide, or petroleum jelly. Your provider may also prescribe medicated suppositories to put inside the rectum.     Procedures and surgeries   A number of procedures can be used  to remove or shrink hemorrhoids. If you have painful, protruding internal hemorrhoids, your healthcare provider can do a procedure called hemorrhoid banding. Your provider will put a tight band around the enlarged vein and either cut the hemorrhoid open, remove any blood clots, and let the vein heal, or let the hemorrhoid dry up and fall off. This method is effective in most cases. Other methods include destroying the hemorrhoid with freezing, electrical or laser heat, or infrared light. Or your provider may shrink the hemorrhoid by injecting a chemical around the swollen vein.   For severe cases of hemorrhoids, a surgical procedure called a hemorrhoidectomy may be done. For this procedure you are first given an anesthetic to prevent you from feeling pain. Then your surgeon removes the hemorrhoids.   How long will the effects last?   Usually hemorrhoids do not pose a danger to your health. In most cases the symptoms go away in a few days. The painful lumps of more severe cases should get better in a couple of weeks.   How can I take care of myself?   Always tell your healthcare provider when you have rectal bleeding. Although bleeding may be from hemorrhoids, more serious illnesses, such as colon cancer, can also cause bleeding.   Follow these guidelines to help prevent hemorrhoids and to relieve their discomfort:     Do not strain during bowel movements. The straining makes hemorrhoids swell.     Follow your high-fiber diet and drink plenty of water. If necessary, take a stool softener, such as Estrella's M-O, psyllium, Metamucil or Citrucel, or mineral oil. Softer stools make it easier to empty the bowels and reduce pressure on the veins.     Don't overuse laxatives. Diarrhea can be as irritating to the anus as constipation.     Ask your healthcare provider what nonprescription product you should buy to relieve pain and itching. Also, ask about any side effects of any medications prescribed for you.     Exercise  regularly to help prevent constipation.     Avoid a lot of wiping after a bowel movement if you have hemorrhoids. Wiping with soft, moist toilet paper (or a commercial moist pad or baby wipe) may relieve discomfort. If necessary, shower instead of wiping, then dry the anus gently.                      Follow-ups after your visit        Additional Services     COLORECTAL SURGERY REFERRAL       Your provider has referred you to: [facility]: Colon and Rectal Surgery Clinic [facility] [phone]   [email or web address]    Referral Reason(s): Anal Fissure  Special Concerns: None  This referral is: Urgent (24 - 72 hours)  It is OK to leave a message on patient's voicemail.    Please be aware that coverage of these services is subject to the terms and limitations of your health insurance plan.  Call member services at your health plan with any benefit or coverage questions.      Please bring the following with you to your appointment:    (1) Any X-Rays, CTs or MRIs which have been performed.  Contact the facility where they were done to arrange for  prior to your scheduled appointment.    (2) List of current medications  (3) This referral request   (4) Any documents/labs given to you for this referral                  Your next 10 appointments already scheduled     Sep 20, 2018 11:30 AM CDT   (Arrive by 11:15 AM)   New Patient Visit with DARYN Borden [facility] Colon and Rectal Surgery ([facility])    [address] [place] [address]  [address] [facility] [address] [phone]              Future tests that were ordered for you today     Open Future Orders        Priority Expected Expires Ordered    CBC with platelets differential Routine  9/6/2019 9/6/2018    Comprehensive metabolic panel Routine  9/6/2019 9/6/2018    TSH with free T4 reflex Routine  9/6/2019 9/6/2018    Lipid panel reflex to direct LDL Fasting Routine   "9/6/2019 9/6/2018            Who to contact     If you have questions or need follow up information about today's clinic visit or your schedule please contact [facility] directly at [phone].  Normal or non-critical lab and imaging results will be communicated to you by MyChart, letter or phone within 4 business days after the clinic has received the results. If you do not hear from us within 7 days, please contact the clinic through MyChart or phone. If you have a critical or abnormal lab result, we will notify you by phone as soon as possible.  Submit refill requests through [employer] or call your pharmacy and they will forward the refill request to us. Please allow 3 business days for your refill to be completed.          Additional Information About Your Visit        [employer] Information     [employer] gives you secure access to your electronic health record. If you see a primary care provider, you can also send messages to your care team and make appointments. If you have questions, please call your primary care clinic.  If you do not have a primary care provider, please call [phone] and they will assist you.        Care EveryWhere ID     This is your Care EveryWhere ID. This could be used by other organizations to access your [facility] medical records  [id number]        Your Vitals Were     Pulse Temperature Height Last Period Pulse Oximetry BMI (Body Mass Index)    95 98.9  F (37.2  C) (Oral) 5' 6\" (1.676 m) 05/31/2006 97% 31.31 kg/m2       Blood Pressure from Last 3 Encounters:   09/01/18 128/76   02/26/18 136/86   09/27/17 136/88    Weight from Last 3 Encounters:   09/01/18 194 lb (88 kg)   02/26/18 202 lb 8 oz (91.9 kg)   09/27/17 206 lb (93.4 kg)              We Performed the Following     COLORECTAL SURGERY REFERRAL        Primary Care Provider Office Phone # Fax #    Philly Goncalves [phone] [address] [facility] [address]        Equal " Access to Services     [facility]: Hadii aad ku hadrodcammie Rossibetzaida, waandresda luqadaha, qaybta kawojciechhudson taylor. So [facility] [phone].    ATENCIÓN: Si habla español, tiene a knight disposición servicios gratuitos de asistencia lingüística. Llame al [phone].    We comply with applicable federal civil rights laws and [place] laws. We do not discriminate on the basis of race, color, national origin, age, disability, sex, sexual orientation, or gender identity.            Thank you!     Thank you for choosing [facility]  for your care. Our goal is always to provide you with excellent care. Hearing back from our patients is one way we can continue to improve our services. Please take a few minutes to complete the written survey that you may receive in the mail after your visit with us. Thank you!             Your Updated Medication List - Protect others around you: Learn how to safely use, store and throw away your medicines at www.disposemymeds.org.          This list is accurate as of 9/1/18 11:59 PM.  Always use your most recent med list.                   Brand Name Dispense Instructions for use Diagnosis    CALCIUM 500 PO      Take 1 tablet by mouth daily    Screening for osteoporosis       cholecalciferol 1000 UNIT tablet    vitamin D3     Take 1 tablet by mouth daily.    Screening for osteoporosis       lisinopril 10 MG tablet    PRINIVIL/ZESTRIL    90 tablet    Take 1 tablet (10 mg) by mouth daily    Essential hypertension with goal blood pressure less than 140/90       LORazepam 0.5 MG tablet    ATIVAN    10 tablet    Take 1 tablet by mouth. 1-2 tabs 30-60 minutes prior to exam/procedure/flight    Claustrophobia       MULTIVITAMIN & MINERAL PO      Take  by mouth daily.    Routine general medical examination at a health care facility, Iron deficiency anemia, unspecified

## 2018-09-03 ENCOUNTER — TELEPHONE (OUTPATIENT)
Dept: FAMILY MEDICINE | Facility: CLINIC | Age: 53
End: 2018-09-03

## 2018-09-04 NOTE — TELEPHONE ENCOUNTER
She is about the same. She doesn't have any big concerns today.  I gave her the referral information and let her know it was marked as urgent 24-72 hours.    She will call us back PRN  Latanya Reyna RN- Triage [employer]

## 2018-09-06 ENCOUNTER — TELEPHONE (OUTPATIENT)
Dept: FAMILY MEDICINE | Facility: CLINIC | Age: 53
End: 2018-09-06

## 2018-09-06 DIAGNOSIS — D50.9 IRON DEFICIENCY ANEMIA, UNSPECIFIED IRON DEFICIENCY ANEMIA TYPE: Primary | ICD-10-CM

## 2018-09-06 DIAGNOSIS — Z13.220 SCREENING FOR HYPERLIPIDEMIA: ICD-10-CM

## 2018-09-06 DIAGNOSIS — I10 HYPERTENSION, UNSPECIFIED TYPE: ICD-10-CM

## 2018-09-06 NOTE — TELEPHONE ENCOUNTER
Upon further review of patient's chart - she is also overdue for lab work - future orders placed for   CMP, CBC with diff.  Okay lab only appointment.  She could do fasting lipid panel at this time as well.       DARYN Murillo, CNP

## 2018-09-06 NOTE — TELEPHONE ENCOUNTER
Called patient @ [phone]    Advised of SENTHIL NP message - Patient stated an understanding and agreed with plan.  Stated she has to wait until the end of this month for insurance purposes. Will call back to schedule.     Susan Lezama RN  [place] [facility]

## 2018-09-13 ASSESSMENT — ENCOUNTER SYMPTOMS
HEARTBURN: 0
VOMITING: 0
BLOATING: 0
CONSTIPATION: 0
ABDOMINAL PAIN: 0
NAUSEA: 0
RECTAL PAIN: 1
BLOOD IN STOOL: 0
BOWEL INCONTINENCE: 1
DIARRHEA: 0

## 2018-09-17 DIAGNOSIS — I10 ESSENTIAL HYPERTENSION WITH GOAL BLOOD PRESSURE LESS THAN 140/90: ICD-10-CM

## 2018-09-17 RX ORDER — LISINOPRIL 10 MG/1
TABLET ORAL
Qty: 90 TABLET | Refills: 0 | Status: SHIPPED | OUTPATIENT
Start: 2018-09-17 | End: 2018-12-23

## 2018-09-17 NOTE — TELEPHONE ENCOUNTER
"Requested Prescriptions   Pending Prescriptions Disp Refills     lisinopril (PRINIVIL/ZESTRIL) 10 MG tablet [Pharmacy Med Name: LISINOPRIL 10MG TABS] 90 tablet 1     Sig: TAKE ONE TABLET BY MOUTH DAILY    Last Refill:   Medication Detail         Disp Refills Start End MADAN     lisinopril (PRINIVIL/ZESTRIL) 10 MG tablet 90 tablet 1 2/26/2018  No     Sig - Route: Take 1 tablet (10 mg) by mouth daily - Oral     ACE Inhibitors (Including Combos) Protocol Passed    9/17/2018  3:05 PM       Passed - Blood pressure under 140/90 in past 12 months    BP Readings from Last 3 Encounters:   09/01/18 128/76   02/26/18 136/86   09/27/17 136/88            Passed - Recent (12 mo) or future (30 days) visit within the authorizing provider's specialty    Patient had office visit in the last 12 months or has a visit in the next 30 days with authorizing provider or within the authorizing provider's specialty.  See \"Patient Info\" tab in inbasket, or \"Choose Columns\" in Meds & Orders section of the refill encounter.     LOV:  09/01/2018         Passed - Patient is age 18 or older       Passed - No active pregnancy on record       Passed - Normal serum creatinine on file in past 12 months    Recent Labs   Lab Test  11/02/17   1609   CR  0.58          Passed - Normal serum potassium on file in past 12 months    Recent Labs   Lab Test  11/02/17   1609   POTASSIUM  4.1          Passed - No positive pregnancy test in past 12 months        Patient due for fasting physical after 09/27/2018 - note sent with refill.     Susan Lezama RN  [place] [facility]    "

## 2018-09-20 ENCOUNTER — OFFICE VISIT (OUTPATIENT)
Dept: SURGERY | Facility: CLINIC | Age: 53
End: 2018-09-20
Payer: COMMERCIAL

## 2018-09-20 VITALS
DIASTOLIC BLOOD PRESSURE: 94 MMHG | TEMPERATURE: 99 F | HEART RATE: 90 BPM | BODY MASS INDEX: 32.03 KG/M2 | HEIGHT: 66 IN | SYSTOLIC BLOOD PRESSURE: 149 MMHG | WEIGHT: 199.3 LBS | OXYGEN SATURATION: 98 %

## 2018-09-20 DIAGNOSIS — K62.89 PROCTITIS: ICD-10-CM

## 2018-09-20 DIAGNOSIS — K62.6 ANAL ULCERATION: Primary | ICD-10-CM

## 2018-09-20 ASSESSMENT — PAIN SCALES - GENERAL: PAINLEVEL: MODERATE PAIN (5)

## 2018-09-20 NOTE — PATIENT INSTRUCTIONS
1. Call [facility] ([facility]) at [phone] to schedule colonoscopy  2. Zinc barrier cream to perianal skin (such as Desitin)  3. Baby wipes only and avoid over cleaning  4. Fiber supplement such as Citrucel or Metamucil. Start with once a day and can increase over the next few weeks to 3-4 times a day.

## 2018-09-20 NOTE — LETTER
2018      RE: Abiola Matute  [address] [facility] [address]       Colon and Rectal Surgery Consult Clinic Note    Date: 2018     Referring provider:  DARYN Murillo CNP  [address] [place] [address]     RE: Abiola Matute  : 1965  MICHELLE: 2018    Abiola Matute is a very pleasant 53 year old female without a significant past medical history with a recent diagnosis of hemorrhoids.  Given these findings they were subsequently sent to the [facility] Rectal Surgery Clinic for an opinion on this and a new patient consultation.     [place] reports having difficulty with external anal skin tags for many years. These are uncomfortable but she denies any severe pain. No increased pain with bowel movements. She is also concerned that she may have some fissuring of her skin. She notices smears of blood with wiping but no blood that drips into the toilet.  She typically has 1-2 bowel movements a day that are soft to loose.  She leaks a small amount of stool a few times a month but denies any incontinence of an entire bowel movement.  She denies any tissue that needs to be manually reduced.  She has never had any anorectal surgeries.  She had 3 vaginal births with her largest baby being 7 lbs. 14 oz.  She believes she had episiotomies with all 3 vaginal births.  She is not on any blood thinners.  She denies any family history of any colon cancer or inflammatory bowel disease.  She had a colonoscopy in , which was normal.    Assessment/Plan: On exam she has multiple, waxy appearing skin tags externally with several areas of ulceration on her perianal skin.  No true anal fissure.  Anoscopy with visible mild proctitis in her distal rectum.  Would recommend a repeat colonoscopy to rule out Crohn's disease given her perianal appearance and likely proctitis.  Advised using baby wipes instead of toilet paper, a zinc barrier cream, and a fiber supplement to bulk up  her stools in the meantime. Patient's questions were answered to her stated satisfaction and she is in agreement with this plan.      Medical history:  Past Medical History:   Diagnosis Date     Atypical ductal hyperplasia of breast 9/10/10    ERT not recommended -left - and flat epithelial atypia-scheduled for breast biopsy 9/17/2010      BIFID UVULA (aka UVULA)       Contact dermatitis and other eczema, due to unspecified cause     perianal  - recurrent - clobetasol      Hypertension      Obesity, unspecified      Other isolated or specific phobias     fear of flying - gets Ativan prn.        Surgical history:  Past Surgical History:   Procedure Laterality Date     BREAST BIOPSY, RT/LT  9/17/2010    left - scheduled with Dr. Kojo WILKERSON APPENDECTOMY  at age 14     COLONOSCOPY  2006     COLONOSCOPY N/A 12/23/2014    Procedure: COMBINED COLONOSCOPY, SINGLE OR MULTIPLE BIOPSY/POLYPECTOMY BY BIOPSY;  Surgeon: Diane Fleming MD;  Location:  GI     HC CLOSED TX BIMALLEOLAR ANKLE FX W/O MANIPULATION  at age 27    left ankle ORIF, plates and screws removed at age 36     HYSTERECTOMY, VAGINAL  2006    with Dr. Licha Zhou - with BSO for fibroids      SURGICAL HISTORY OF -   4/15/2010    Pelviscopy with removal of bilateral hydrosalpinges.        Problem list:  Patient Active Problem List    Diagnosis Date Noted     Alcoholic fatty liver 05/14/2014     Priority: High     Acquired hyperbilirubinemia- ? secondary to alcohol use 05/14/2014     Priority: High     Atypical ductal hyperplasia of left breast 10/04/2010     Priority: High     Stool incontinence 09/30/2014     Priority: Medium     CARDIOVASCULAR SCREENING; LDL GOAL LESS THAN 130 09/30/2014     Priority: Medium     Gastroenteritis 05/20/2014     Priority: Medium     Diffuse nodular cirrhosis of liver (H) 05/20/2014     Priority: Medium     Per US while in hospital 5/20/2014:   IMPRESSION:  1. A small amount of nonspecific free perihepatic fluid.  2.  Unremarkable appearance of the gallbladder.  3. No biliary dilatation.  4. The outer contour of the liver appears slightly nodular. This could  relate to cirrhosis. Additionally, there is mild diffuse fatty  infiltration of the liver.          AA (alcohol abuse) - ? ongoing  05/14/2014     Priority: Medium     Per hospital d/c summary 5/2014: Alcoholic cirrhosis of liver with small ascites and ultrasound consistent with liver nodule.  She had elevated LFTs and was higher in 2013.  Her hepatitis C and B antigens are negative and her hepatitis A antigen and antibody also was negative.  The patient had a long history of alcohol use.  She drinks about 4 drinks every other day or every day and the patient was advised alcohol cessation and was explained that her liver was damaged although her LFTs are decreased from levels of 09/2013 but I think she already developed cirrhosis of the liver and without stopping alcohol she will progress to full blown cirrhosis of the liver with portal hypertension and understand the consequences and patient understood she needs to go to AA and alcohol treatment.  The patient was placed with alcohol withdrawal protocol with Ativan started with vitamins during her admission here.  The patient understood the consequence of continued alcohol use.         Essential hypertension with goal blood pressure less than 140/90 06/18/2013     Priority: Medium     Abnormal liver enzymes- ? related to ongoing etoh use/abuse 10/12/2012     Priority: Medium     S/P total hysterectomy and bilateral salpingo-oophorectomy 09/26/2012     Priority: Medium     Claustrophobia 09/19/2011     Priority: Medium     Postmenopausal- s/p hysterectomy with BSO - not on HRT secondary to atypical ductal hyperplasia & breast pain -no paps needed 08/17/2011     Priority: Medium     Idiopathic thrombocytopenia (H) 03/01/2011     Priority: Medium     Rosacea 08/24/2010     Priority: Medium     Iron deficiency anemia,  unspecified iron deficiency anemia type 05/08/2006     Priority: Medium     Problem list name updated by automated process. Provider to review       Intestinal infection due to Clostridium difficile 03/09/2006     Priority: Medium     Other isolated or specific phobias 12/06/2005     Priority: Medium     fear of flying - gets Ativan prn.        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 Provider to review and confirm.       Contact dermatitis and other eczema, due to unspecified cause 12/06/2005     Priority: Medium     perianal  - recurrent - clobetasol        Non morbid obesity due to excess calories 12/23/2003     Priority: Medium     Problem list name updated by automated process. Provider to review         Medications:  Current Outpatient Prescriptions   Medication Sig Dispense Refill     Calcium Carbonate (CALCIUM 500 PO) Take 1 tablet by mouth daily        cholecalciferol (VITAMIN D) 1000 UNIT tablet Take 1 tablet by mouth daily.       lisinopril (PRINIVIL/ZESTRIL) 10 MG tablet TAKE ONE TABLET BY MOUTH DAILY 90 tablet 0     LORazepam (ATIVAN) 0.5 MG tablet Take 1 tablet by mouth. 1-2 tabs 30-60 minutes prior to exam/procedure/flight 10 tablet 0     Multiple Vitamins-Minerals (MULTIVITAMIN & MINERAL PO) Take  by mouth daily.         Allergies:  Allergies   Allergen Reactions     Fish Oil      Redness and itching around eye area only - went away when fish oil capsules stopped      Metronidazole      pain/itching     Naphthalenemethylamines      Lamisil = mild urticarial reaction     Ppd [Tuberculin Purified Protein Derivative]      Sulfa Drugs      hives       Family history:  Family History   Problem Relation Age of Onset     Breast Cancer Mother      GASTROINTESTINAL DISEASE Mother      HEART DISEASE Father 57     HEART DISEASE Paternal Grandfather      Hypertension Maternal Grandmother      Diabetes Paternal Grandmother      Diabetes  "Maternal Grandfather      Cancer Sister        Social history:  Social History   Substance Use Topics     Smoking status: Never Smoker     Smokeless tobacco: Never Used     Alcohol use 0.0 oz/week     0 Standard drinks or equivalent per week      Comment: 10-12 drinks per week    Marital status: .  Occupation:  at [employer]    Nursing Notes:   Angel Jean CMA  9/20/2018 11:36 AM  Signed  Chief Complaint   Patient presents with     Rectal Problem     Anal fissure.       Vitals:    09/20/18 1134   BP: (!) 149/94   Pulse: 90   Temp: 99  F (37.2  C)   TempSrc: Oral   SpO2: 98%   Weight: 199 lb 4.8 oz   Height: 5' 6\"       Body mass index is 32.17 kg/(m^2).      ARNOLD Bloom                         Physical Examination:  BP (!) 149/94  Pulse 90  Temp 99  F (37.2  C) (Oral)  Ht 5' 6\"  Wt 199 lb 4.8 oz  LMP 05/31/2006  SpO2 98%  BMI 32.17 kg/m2  General: Alert, oriented, in no acute distress, sitting comfortably  HEENT: Mucous membranes moist  Perianal external examination: Exam was chaperoned by ARNOLD Bloom   Multiple, waxy appearing skin tags present at the anal opening with 3 areas of ulceration in the posterior, left lateral, and left anterior positions.  No purulent drainage.  Digital rectal examination: Was performed.   Sphincter tone: Good.  Palpable lesions: No.  Other: None.  Bimanual examination: was not performed    Anoscopy: Was performed.   Hemorrhoids: Grade 1-2 internal hemorrhoids  Lesions: mild proctitis in distal rectum without bleeding    Total face to face time was 30 minutes, >50% counseling.    DARYN Daly, NP-C  Colon and Rectal Surgery   [facility]    This note was created using speech recognition software and may contain unintended word substitutions.      DARYN Daly CNP      "

## 2018-09-20 NOTE — MR AVS SNAPSHOT
After Visit Summary   9/20/2018    Abiola Matute    MRN: [id number]           Patient Information     Date Of Birth          1965        Visit Information        Provider Department      9/20/2018 11:30 AM Lisa Pearce APRN [facility] Colon and Rectal Surgery        Today's Diagnoses     Anal ulceration    -  1    Proctitis          Care Instructions    1. Call [facility] ([facility]) at [phone] to schedule colonoscopy  2. Zinc barrier cream to perianal skin (such as Desitin)  3. Baby wipes only and avoid over cleaning  4. Fiber supplement such as Citrucel or Metamucil. Start with once a day and can increase over the next few weeks to 3-4 times a day.          Follow-ups after your visit        Additional Services     GASTROENTEROLOGY ADULT REF PROCEDURE ONLY       Last Lab Result: Creatinine (mg/dL)       Date                     Value                 11/02/2017               0.58             ----------  Body mass index is 32.17 kg/(m^2).     Needed:  No  Language:  English    Patient will be contacted to schedule procedure.     Please be aware that coverage of these services is subject to the terms and limitations of your health insurance plan.  Call member services at your health plan with any benefit or coverage questions.  Any procedures must be performed at a [place] facility OR coordinated by your clinic's referral office.    Please bring the following with you to your appointment:    (1) Any X-Rays, CTs or MRIs which have been performed.  Contact the facility where they were done to arrange for  prior to your scheduled appointment.    (2) List of current medications   (3) This referral request   (4) Any documents/labs given to you for this referral                  Who to contact     Please call your clinic at [phone] to:    Ask questions about your health    Make or cancel appointments    Discuss your medicines    Learn about  "your test results    Speak to your doctor            Additional Information About Your Visit        [email or web address] Information     [employer] gives you secure access to your electronic health record. If you see a primary care provider, you can also send messages to your care team and make appointments. If you have questions, please call your primary care clinic.  If you do not have a primary care provider, please call [phone] and they will assist you.      [employer] is an electronic gateway that provides easy, online access to your medical records. With [employer], you can request a clinic appointment, read your test results, renew a prescription or communicate with your care team.     To access your existing account, please contact your [facility] or call [phone] for assistance.        Care EveryWhere ID     This is your Care EveryWhere ID. This could be used by other organizations to access your [facility] medical records  [id number]        Your Vitals Were     Pulse Temperature Height Last Period Pulse Oximetry BMI (Body Mass Index)    90 99  F (37.2  C) (Oral) 5' 6\" 05/31/2006 98% 32.17 kg/m2       Blood Pressure from Last 3 Encounters:   09/20/18 (!) 149/94   09/01/18 128/76   02/26/18 136/86    Weight from Last 3 Encounters:   09/20/18 199 lb 4.8 oz   09/01/18 194 lb   02/26/18 202 lb 8 oz              We Performed the Following     ANOSCOPY W/WO BRUSH/WASH     GASTROENTEROLOGY ADULT REF PROCEDURE ONLY        Primary Care Provider Office Phone # Fax #    [place] YUDELKA Goncalves [phone]       [address] [place] [address]        Equal Access to Services     [facility]: Hadii aad ku hadasho Soomaali, waaxda luqadaha, qaybta kaalmada hudson mercedes . So [facility] [phone].    ATENCIÓN: Si habla español, tiene a knight disposición servicios gratuitos de asistencia lingüística. Llame al [phone].    We comply with " applicable federal civil rights laws and [place] laws. We do not discriminate on the basis of race, color, national origin, age, disability, sex, sexual orientation, or gender identity.            Thank you!     Thank you for choosing [facility] COLON AND RECTAL SURGERY  for your care. Our goal is always to provide you with excellent care. Hearing back from our patients is one way we can continue to improve our services. Please take a few minutes to complete the written survey that you may receive in the mail after your visit with us. Thank you!             Your Updated Medication List - Protect others around you: Learn how to safely use, store and throw away your medicines at www.disposemymeds.org.          This list is accurate as of 9/20/18 12:06 PM.  Always use your most recent med list.                   Brand Name Dispense Instructions for use Diagnosis    CALCIUM 500 PO      Take 1 tablet by mouth daily    Screening for osteoporosis       cholecalciferol 1000 UNIT tablet    vitamin D3     Take 1 tablet by mouth daily.    Screening for osteoporosis       lisinopril 10 MG tablet    PRINIVIL/ZESTRIL    90 tablet    TAKE ONE TABLET BY MOUTH DAILY    Essential hypertension with goal blood pressure less than 140/90       LORazepam 0.5 MG tablet    ATIVAN    10 tablet    Take 1 tablet by mouth. 1-2 tabs 30-60 minutes prior to exam/procedure/flight    Claustrophobia       MULTIVITAMIN & MINERAL PO      Take  by mouth daily.    Routine general medical examination at a health care facility, Iron deficiency anemia, unspecified

## 2018-09-20 NOTE — PROGRESS NOTES
Colon and Rectal Surgery Consult Clinic Note    Date: 2018     Referring provider:  DARYN Murillo CNP  [address] [place] [address]     RE: Abiola Matute  : 1965  MICHELLE: 2018    Abiola Matute is a very pleasant 53 year old female without a significant past medical history with a recent diagnosis of hemorrhoids.  Given these findings they were subsequently sent to the [facility] for an opinion on this and a new patient consultation.     Virginia reports having difficulty with external anal skin tags for many years. These are uncomfortable but she denies any severe pain. No increased pain with bowel movements. She is also concerned that she may have some fissuring of her skin. She notices smears of blood with wiping but no blood that drips into the toilet.  She typically has 1-2 bowel movements a day that are soft to loose.  She leaks a small amount of stool a few times a month but denies any incontinence of an entire bowel movement.  She denies any tissue that needs to be manually reduced.  She has never had any anorectal surgeries.  She had 3 vaginal births with her largest baby being 7 lbs. 14 oz.  She believes she had episiotomies with all 3 vaginal births.  She is not on any blood thinners.  She denies any family history of any colon cancer or inflammatory bowel disease.  She had a colonoscopy in , which was normal.    Assessment/Plan: On exam she has multiple, waxy appearing skin tags externally with several areas of ulceration on her perianal skin.  No true anal fissure.  Anoscopy with visible mild proctitis in her distal rectum.  Would recommend a repeat colonoscopy to rule out Crohn's disease given her perianal appearance and likely proctitis.  Advised using baby wipes instead of toilet paper, a zinc barrier cream, and a fiber supplement to bulk up her stools in the meantime. Patient's questions were answered to her stated satisfaction and  she is in agreement with this plan.      Medical history:  Past Medical History:   Diagnosis Date     Atypical ductal hyperplasia of breast 9/10/10    ERT not recommended -left - and flat epithelial atypia-scheduled for breast biopsy 9/17/2010      BIFID UVULA (aka UVULA)       Contact dermatitis and other eczema, due to unspecified cause     perianal  - recurrent - clobetasol      Hypertension      Obesity, unspecified      Other isolated or specific phobias     fear of flying - gets Ativan prn.        Surgical history:  Past Surgical History:   Procedure Laterality Date     BREAST BIOPSY, RT/LT  9/17/2010    left - scheduled with Dr. Kojo WILKERSON APPENDECTOMY  at age 14     COLONOSCOPY  2006     COLONOSCOPY N/A 12/23/2014    Procedure: COMBINED COLONOSCOPY, SINGLE OR MULTIPLE BIOPSY/POLYPECTOMY BY BIOPSY;  Surgeon: Diane Fleming MD;  Location:  GI     HC CLOSED TX BIMALLEOLAR ANKLE FX W/O MANIPULATION  at age 27    left ankle ORIF, plates and screws removed at age 36     HYSTERECTOMY, VAGINAL  2006    with Dr. Licha hZou - with BSO for fibroids      SURGICAL HISTORY OF -   4/15/2010    Pelviscopy with removal of bilateral hydrosalpinges.        Problem list:  Patient Active Problem List    Diagnosis Date Noted     Alcoholic fatty liver 05/14/2014     Priority: High     Acquired hyperbilirubinemia- ? secondary to alcohol use 05/14/2014     Priority: High     Atypical ductal hyperplasia of left breast 10/04/2010     Priority: High     Stool incontinence 09/30/2014     Priority: Medium     CARDIOVASCULAR SCREENING; LDL GOAL LESS THAN 130 09/30/2014     Priority: Medium     Gastroenteritis 05/20/2014     Priority: Medium     Diffuse nodular cirrhosis of liver (H) 05/20/2014     Priority: Medium     Per US while in hospital 5/20/2014:   IMPRESSION:  1. A small amount of nonspecific free perihepatic fluid.  2. Unremarkable appearance of the gallbladder.  3. No biliary dilatation.  4. The outer contour of  the liver appears slightly nodular. This could  relate to cirrhosis. Additionally, there is mild diffuse fatty  infiltration of the liver.          AA (alcohol abuse) - ? ongoing  05/14/2014     Priority: Medium     Per hospital d/c summary 5/2014: Alcoholic cirrhosis of liver with small ascites and ultrasound consistent with liver nodule.  She had elevated LFTs and was higher in 2013.  Her hepatitis C and B antigens are negative and her hepatitis A antigen and antibody also was negative.  The patient had a long history of alcohol use.  She drinks about 4 drinks every other day or every day and the patient was advised alcohol cessation and was explained that her liver was damaged although her LFTs are decreased from levels of 09/2013 but I think she already developed cirrhosis of the liver and without stopping alcohol she will progress to full blown cirrhosis of the liver with portal hypertension and understand the consequences and patient understood she needs to go to AA and alcohol treatment.  The patient was placed with alcohol withdrawal protocol with Ativan started with vitamins during her admission here.  The patient understood the consequence of continued alcohol use.         Essential hypertension with goal blood pressure less than 140/90 06/18/2013     Priority: Medium     Abnormal liver enzymes- ? related to ongoing etoh use/abuse 10/12/2012     Priority: Medium     S/P total hysterectomy and bilateral salpingo-oophorectomy 09/26/2012     Priority: Medium     Claustrophobia 09/19/2011     Priority: Medium     Postmenopausal- s/p hysterectomy with BSO - not on HRT secondary to atypical ductal hyperplasia & breast pain -no paps needed 08/17/2011     Priority: Medium     Idiopathic thrombocytopenia (H) 03/01/2011     Priority: Medium     Rosacea 08/24/2010     Priority: Medium     Iron deficiency anemia, unspecified iron deficiency anemia type 05/08/2006     Priority: Medium     Problem list name updated by  automated process. Provider to review       Intestinal infection due to Clostridium difficile 03/09/2006     Priority: Medium     Other isolated or specific phobias 12/06/2005     Priority: Medium     fear of flying - gets Ativan prn.        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 Provider to review and confirm.       Contact dermatitis and other eczema, due to unspecified cause 12/06/2005     Priority: Medium     perianal  - recurrent - clobetasol        Non morbid obesity due to excess calories 12/23/2003     Priority: Medium     Problem list name updated by automated process. Provider to review         Medications:  Current Outpatient Prescriptions   Medication Sig Dispense Refill     Calcium Carbonate (CALCIUM 500 PO) Take 1 tablet by mouth daily        cholecalciferol (VITAMIN D) 1000 UNIT tablet Take 1 tablet by mouth daily.       lisinopril (PRINIVIL/ZESTRIL) 10 MG tablet TAKE ONE TABLET BY MOUTH DAILY 90 tablet 0     LORazepam (ATIVAN) 0.5 MG tablet Take 1 tablet by mouth. 1-2 tabs 30-60 minutes prior to exam/procedure/flight 10 tablet 0     Multiple Vitamins-Minerals (MULTIVITAMIN & MINERAL PO) Take  by mouth daily.         Allergies:  Allergies   Allergen Reactions     Fish Oil      Redness and itching around eye area only - went away when fish oil capsules stopped      Metronidazole      pain/itching     Naphthalenemethylamines      Lamisil = mild urticarial reaction     Ppd [Tuberculin Purified Protein Derivative]      Sulfa Drugs      hives       Family history:  Family History   Problem Relation Age of Onset     Breast Cancer Mother      GASTROINTESTINAL DISEASE Mother      HEART DISEASE Father 57     HEART DISEASE Paternal Grandfather      Hypertension Maternal Grandmother      Diabetes Paternal Grandmother      Diabetes Maternal Grandfather      Cancer Sister        Social history:  Social History   Substance Use Topics      "Smoking status: Never Smoker     Smokeless tobacco: Never Used     Alcohol use 0.0 oz/week     0 Standard drinks or equivalent per week      Comment: 10-12 drinks per week    Marital status: .  Occupation:  at [employer]    Nursing Notes:   Angel Jean CMA  9/20/2018 11:36 AM  Signed  Chief Complaint   Patient presents with     Rectal Problem     Anal fissure.       Vitals:    09/20/18 1134   BP: (!) 149/94   Pulse: 90   Temp: 99  F (37.2  C)   TempSrc: Oral   SpO2: 98%   Weight: 199 lb 4.8 oz   Height: 5' 6\"       Body mass index is 32.17 kg/(m^2).      ARNOLD Bloom                         Physical Examination:  BP (!) 149/94  Pulse 90  Temp 99  F (37.2  C) (Oral)  Ht 5' 6\"  Wt 199 lb 4.8 oz  LMP 05/31/2006  SpO2 98%  BMI 32.17 kg/m2  General: Alert, oriented, in no acute distress, sitting comfortably  HEENT: Mucous membranes moist  Perianal external examination: Exam was chaperoned by ARNOLD Bloom   Multiple, waxy appearing skin tags present at the anal opening with 3 areas of ulceration in the posterior, left lateral, and left anterior positions.  No purulent drainage.  Digital rectal examination: Was performed.   Sphincter tone: Good.  Palpable lesions: No.  Other: None.  Bimanual examination: was not performed    Anoscopy: Was performed.   Hemorrhoids: Grade 1-2 internal hemorrhoids  Lesions: mild proctitis in distal rectum without bleeding    Total face to face time was 30 minutes, >50% counseling.    DARYN Daly, NP-C  Colon and Rectal Surgery   [facility]    This note was created using speech recognition software and may contain unintended word substitutions.    "

## 2018-09-20 NOTE — NURSING NOTE
"Chief Complaint   Patient presents with     Rectal Problem     Anal fissure.       Vitals:    09/20/18 1134   BP: (!) 149/94   Pulse: 90   Temp: 99  F (37.2  C)   TempSrc: Oral   SpO2: 98%   Weight: 199 lb 4.8 oz   Height: 5' 6\"       Body mass index is 32.17 kg/(m^2).      Angel Jean, EMT                      "

## 2018-09-21 ENCOUNTER — TELEPHONE (OUTPATIENT)
Dept: GASTROENTEROLOGY | Facility: CLINIC | Age: 53
End: 2018-09-21

## 2018-10-02 ENCOUNTER — TELEPHONE (OUTPATIENT)
Dept: GASTROENTEROLOGY | Facility: OUTPATIENT CENTER | Age: 53
End: 2018-10-02

## 2018-10-02 DIAGNOSIS — K62.89 PROCTITIS: Primary | ICD-10-CM

## 2018-10-02 NOTE — TELEPHONE ENCOUNTER
Patient taking any blood thinners ? No     Heart disease ? Denies     Lung disease ?Denies       Sleep apnea ?Denies     Diabetic ?Denies     Kidney disease ?Denies     Dialysis ? n/a    Electronic implanted medical devices ?Denies     Are you taking any narcotic pain medication ? Denies   What is your daily dosage ?    PTSD ? n/a    Prep instructions reviewed with patient ? Patient declined review.  policy, MAC sedation plan reviewed. Advised patient to have soemone stay with her post exam    Pharmacy : [facility]    Indication for procedure :  Anal ulceration [K62.6]  - Primary          Proctitis [K62.89]                 Referring provider :Lisa Pearce, APRN CNP     Arrival Time : 7:30 AM

## 2018-10-23 ENCOUNTER — TRANSFERRED RECORDS (OUTPATIENT)
Dept: HEALTH INFORMATION MANAGEMENT | Facility: CLINIC | Age: 53
End: 2018-10-23

## 2018-10-23 ENCOUNTER — DOCUMENTATION ONLY (OUTPATIENT)
Dept: GASTROENTEROLOGY | Facility: OUTPATIENT CENTER | Age: 53
End: 2018-10-23
Payer: COMMERCIAL

## 2018-12-06 ENCOUNTER — TELEPHONE (OUTPATIENT)
Dept: GASTROENTEROLOGY | Facility: CLINIC | Age: 53
End: 2018-12-06

## 2018-12-06 NOTE — TELEPHONE ENCOUNTER
[facility] Call Center    Phone Message    May a detailed message be left on voicemail: yes    Reason for Call: Symptoms or Concerns     If patient has red-flag symptoms, warm transfer to triage line    Current symptom or concern: Proctitis/IBD    Symptoms have been present for:  several month(s)    Has patient previously been seen for this? Yes    By PCP     Date:     Are there any new or worsening symptoms? Yes:       Action Taken: Message routed to:  [facility] ([facility]): Pt is scheduled for the first available of 1/23/19 but will need to be seen within one Mo. please call her as soon as possible with an earlier date.

## 2018-12-07 ENCOUNTER — ALLIED HEALTH/NURSE VISIT (OUTPATIENT)
Dept: FAMILY MEDICINE | Facility: CLINIC | Age: 53
End: 2018-12-07

## 2018-12-07 VITALS — DIASTOLIC BLOOD PRESSURE: 78 MMHG | SYSTOLIC BLOOD PRESSURE: 135 MMHG

## 2018-12-07 DIAGNOSIS — Z13.220 SCREENING FOR HYPERLIPIDEMIA: ICD-10-CM

## 2018-12-07 DIAGNOSIS — D50.9 IRON DEFICIENCY ANEMIA, UNSPECIFIED IRON DEFICIENCY ANEMIA TYPE: ICD-10-CM

## 2018-12-07 DIAGNOSIS — I10 HYPERTENSION, UNSPECIFIED TYPE: ICD-10-CM

## 2018-12-07 DIAGNOSIS — Z01.30 BP CHECK: Primary | ICD-10-CM

## 2018-12-07 LAB
BASOPHILS # BLD AUTO: 0 10E9/L (ref 0–0.2)
BASOPHILS NFR BLD AUTO: 0.4 %
DIFFERENTIAL METHOD BLD: ABNORMAL
EOSINOPHIL # BLD AUTO: 0.1 10E9/L (ref 0–0.7)
EOSINOPHIL NFR BLD AUTO: 2.6 %
ERYTHROCYTE [DISTWIDTH] IN BLOOD BY AUTOMATED COUNT: 13.8 % (ref 10–15)
HCT VFR BLD AUTO: 39.5 % (ref 35–47)
HGB BLD-MCNC: 13 G/DL (ref 11.7–15.7)
LYMPHOCYTES # BLD AUTO: 1.3 10E9/L (ref 0.8–5.3)
LYMPHOCYTES NFR BLD AUTO: 25.9 %
MCH RBC QN AUTO: 35.9 PG (ref 26.5–33)
MCHC RBC AUTO-ENTMCNC: 32.9 G/DL (ref 31.5–36.5)
MCV RBC AUTO: 109 FL (ref 78–100)
MONOCYTES # BLD AUTO: 0.4 10E9/L (ref 0–1.3)
MONOCYTES NFR BLD AUTO: 8.7 %
NEUTROPHILS # BLD AUTO: 3.1 10E9/L (ref 1.6–8.3)
NEUTROPHILS NFR BLD AUTO: 62.4 %
PLATELET # BLD AUTO: 106 10E9/L (ref 150–450)
RBC # BLD AUTO: 3.62 10E12/L (ref 3.8–5.2)
WBC # BLD AUTO: 4.9 10E9/L (ref 4–11)

## 2018-12-07 PROCEDURE — 36415 COLL VENOUS BLD VENIPUNCTURE: CPT | Performed by: NURSE PRACTITIONER

## 2018-12-07 PROCEDURE — 85025 COMPLETE CBC W/AUTO DIFF WBC: CPT | Performed by: NURSE PRACTITIONER

## 2018-12-07 PROCEDURE — 99207 ZZC NO CHARGE NURSE ONLY: CPT | Performed by: FAMILY MEDICINE

## 2018-12-07 PROCEDURE — 80061 LIPID PANEL: CPT | Performed by: NURSE PRACTITIONER

## 2018-12-07 PROCEDURE — 80053 COMPREHEN METABOLIC PANEL: CPT | Performed by: NURSE PRACTITIONER

## 2018-12-07 PROCEDURE — 84443 ASSAY THYROID STIM HORMONE: CPT | Performed by: NURSE PRACTITIONER

## 2018-12-07 NOTE — PROGRESS NOTES
Abiola Matute is enrolled/participating in the retail pharmacy Blood Pressure Goals Achievement Program (BPGAP).  Abiola Matute was evaluated at [facility] on December 7, 2018 at which time her blood pressure was:    BP Readings from Last 3 Encounters:   12/07/18 135/78   09/20/18 (!) 149/94   09/01/18 128/76     Reviewed lifestyle modifications for blood pressure control and reduction: including making healthy food choices, managing weight, getting regular exercise, smoking cessation, reducing alcohol consumption, monitoring blood pressure regularly.     Abiola Matute is not experiencing symptoms.    Follow-Up: BP is at goal of < 140/90mmHg (patient 18+ years of age with or without diabetes).  Recommended follow-up at pharmacy in 6 months.     Recommendation to Provider: no change at this time.         This note completed by: Joyce Murillo, PharmD  [facility]  PH: [phone]

## 2018-12-07 NOTE — TELEPHONE ENCOUNTER
Attempted to reach pt to set up appt and discuss symptoms.  Left a message for pt to call back to discuss  Earlier appt and also symptoms.   Moved up to December 11 due to cancellation.

## 2018-12-08 LAB
ALBUMIN SERPL-MCNC: 2.9 G/DL (ref 3.4–5)
ALP SERPL-CCNC: 139 U/L (ref 40–150)
ALT SERPL W P-5'-P-CCNC: 42 U/L (ref 0–50)
ANION GAP SERPL CALCULATED.3IONS-SCNC: 7 MMOL/L (ref 3–14)
AST SERPL W P-5'-P-CCNC: 75 U/L (ref 0–45)
BILIRUB SERPL-MCNC: 2.1 MG/DL (ref 0.2–1.3)
BUN SERPL-MCNC: 6 MG/DL (ref 7–30)
CALCIUM SERPL-MCNC: 8.3 MG/DL (ref 8.5–10.1)
CHLORIDE SERPL-SCNC: 102 MMOL/L (ref 94–109)
CHOLEST SERPL-MCNC: 96 MG/DL
CO2 SERPL-SCNC: 25 MMOL/L (ref 20–32)
CREAT SERPL-MCNC: 0.53 MG/DL (ref 0.52–1.04)
GFR SERPL CREATININE-BSD FRML MDRD: >90 ML/MIN/1.7M2
GLUCOSE SERPL-MCNC: 138 MG/DL (ref 70–99)
HDLC SERPL-MCNC: 22 MG/DL
LDLC SERPL CALC-MCNC: 61 MG/DL
NONHDLC SERPL-MCNC: 74 MG/DL
POTASSIUM SERPL-SCNC: 3.5 MMOL/L (ref 3.4–5.3)
PROT SERPL-MCNC: 8 G/DL (ref 6.8–8.8)
SODIUM SERPL-SCNC: 134 MMOL/L (ref 133–144)
TRIGL SERPL-MCNC: 67 MG/DL
TSH SERPL DL<=0.005 MIU/L-ACNC: 1.56 MU/L (ref 0.4–4)

## 2018-12-10 ENCOUNTER — TELEPHONE (OUTPATIENT)
Dept: FAMILY MEDICINE | Facility: CLINIC | Age: 53
End: 2018-12-10

## 2018-12-10 ENCOUNTER — PATIENT OUTREACH (OUTPATIENT)
Dept: GASTROENTEROLOGY | Facility: CLINIC | Age: 53
End: 2018-12-10

## 2018-12-10 DIAGNOSIS — R79.0 DECREASED BLOOD CALCIUM: ICD-10-CM

## 2018-12-10 DIAGNOSIS — R17 SERUM TOTAL BILIRUBIN ELEVATED: Primary | ICD-10-CM

## 2018-12-10 DIAGNOSIS — R74.8 LOW SERUM HDL: ICD-10-CM

## 2018-12-10 NOTE — TELEPHONE ENCOUNTER
Called patient @ [phone]    Advised of results.   Patient stated that she is scheduled with GI for 01/2019.   Patient stated she will call back to schedule lab and OV with SENTHIL, NP.   Labs futured    Patient stated an understanding and agreed with plan.      Susan Lezama RN  [facility]

## 2018-12-10 NOTE — TELEPHONE ENCOUNTER
Please call patient - her result lab results show -    -Normal red blood cell (hgb) levels, normal white blood cell count and normal platelet levels.  -HDL(good) cholesterol level is low which can increase your heart disease risk.  A diet high in fat and simple carbohydrates, genetics and being overweight can contribute to this. Your LDL(bad) cholesterol and trigylceride levels are normal.  ADVISE: exercising 150 minutes of aerobic exercise per week (30 minutes 5 days per week or 50 minutes 3 days per week are options), and omega-3 fatty acids (fish oil) 4179-1032 mg daily are helpful to improve this.  In 6 months, you should recheck your fasting cholesterol panel by scheduling a lab-only appointment.  -Liver and gallbladder tests (ALT,AST, Alk phos,bilirubin) are remain elevated. ADVISE: following up with GI and doing an abdominal ultrasound.   Refrain from alcohol intake and tylenol.  Does she have a follow-up appointment with GI scheduled?   -Kidney function (GFR) is normal.  -Sodium is normal.  -Potassium is normal.  -Calcium is decreased.  ADVISE: getting more calcium in your diet and then rechecking this in 1 month.  -Glucose is elevated and a sign diabetes.  ADVISE: scheduling an appointment with me to discuss treatment recommendations.  -TSH (thyroid stimulating hormone) level is normal which indicates normal thyroid function.    I ordered an abdominal ultrasound but believe she has a scheduled GI appointment. If not, please place referral.        Cori Escamilla, DNP, APRN, CNP

## 2018-12-23 DIAGNOSIS — I10 ESSENTIAL HYPERTENSION WITH GOAL BLOOD PRESSURE LESS THAN 140/90: ICD-10-CM

## 2018-12-24 RX ORDER — LISINOPRIL 10 MG/1
TABLET ORAL
Qty: 90 TABLET | Refills: 3 | Status: SHIPPED | OUTPATIENT
Start: 2018-12-24 | End: 2019-09-23

## 2018-12-24 NOTE — TELEPHONE ENCOUNTER
"Prescription approved per [facility] Refill Protocol.  Dina REDMAN, RN   [facility]         Lisinopril (PRINIVIL/ZESTRIL)    Last Written Prescription Date:  09/17/2018  Last Fill Quantity: 90,  # refills: 0   Last office visit: 9/1/2018   Future Office Visit:  ;          Requested Prescriptions   Pending Prescriptions Disp Refills     lisinopril (PRINIVIL/ZESTRIL) 10 MG tablet [Pharmacy Med Name: LISINOPRIL 10MG TABS] 90 tablet 0     Sig: TAKE ONE TABLET BY MOUTH DAILY    ACE Inhibitors (Including Combos) Protocol Passed - 12/24/2018  7:36 AM       Passed - Blood pressure under 140/90 in past 12 months    BP Readings from Last 3 Encounters:   12/07/18 135/78   09/20/18 (!) 149/94   09/01/18 128/76                Passed - Recent (12 mo) or future (30 days) visit within the authorizing provider's specialty    Patient had office visit in the last 12 months or has a visit in the next 30 days with authorizing provider or within the authorizing provider's specialty.  See \"Patient Info\" tab in inbasket, or \"Choose Columns\" in Meds & Orders section of the refill encounter.             Passed - Patient is age 18 or older       Passed - No active pregnancy on record       Passed - Normal serum creatinine on file in past 12 months    Recent Labs   Lab Test 12/07/18  1204   CR 0.53            Passed - Normal serum potassium on file in past 12 months    Recent Labs   Lab Test 12/07/18  1204   POTASSIUM 3.5            Passed - No positive pregnancy test in past 12 months          "

## 2019-01-10 ENCOUNTER — PATIENT OUTREACH (OUTPATIENT)
Dept: CARE COORDINATION | Facility: CLINIC | Age: 54
End: 2019-01-10

## 2019-02-07 ENCOUNTER — TELEPHONE (OUTPATIENT)
Dept: FAMILY MEDICINE | Facility: CLINIC | Age: 54
End: 2019-02-07

## 2019-02-07 DIAGNOSIS — R15.9 INCONTINENCE OF FECES, UNSPECIFIED FECAL INCONTINENCE TYPE: ICD-10-CM

## 2019-02-07 DIAGNOSIS — K52.9 GASTROENTERITIS: ICD-10-CM

## 2019-02-07 DIAGNOSIS — R74.8 ABNORMAL LIVER ENZYMES: Primary | Chronic | ICD-10-CM

## 2019-02-07 NOTE — TELEPHONE ENCOUNTER
I spoke with patient.  She was referred to [facility] for proctitis but cannot get in until March.  She is asking if there is another GI clinic.  Referral and scheduling number given for [place] [facility].      KATERINA Yen, RN, [facility]) [phone]

## 2019-02-07 NOTE — TELEPHONE ENCOUNTER
Why does she want a colonoscopy ordered?  She just had a colonoscopy on 10/23/2018 and was advised to repeat in 5 years.      KATERINA Yen, RN, N  [facility]) [phone]

## 2019-02-07 NOTE — TELEPHONE ENCOUNTER
Triage can you put a new order in for patient for a screening colonoscopy. She has one in her chart but for the [facility]. She does have an appt late March but wants to have this done sooner. Can you put an order in for Sussy Alex. Can you please call patient when completed. Thanks    Marisel Lobato  Referral Coordinator

## 2019-02-14 ENCOUNTER — TRANSFERRED RECORDS (OUTPATIENT)
Dept: HEALTH INFORMATION MANAGEMENT | Facility: CLINIC | Age: 54
End: 2019-02-14

## 2019-02-14 ENCOUNTER — TELEPHONE (OUTPATIENT)
Dept: FAMILY MEDICINE | Facility: CLINIC | Age: 54
End: 2019-02-14

## 2019-02-14 NOTE — TELEPHONE ENCOUNTER
Blood with small clot when wiping after BM this morning  Onset: Today    Description:   Pain: no   Itching: no     Accompanying Signs & Symptoms:  Blood streaked toilet paper: YES  Blood in stool: no   Changes in stool pattern: no     History:   Any previous GI studies done:Colonoscopy  Family History of colon cancer: no     Precipitating factors:   None    Alleviating factors:  None    Therapies Tried and outcome: none       Patient has never had this issue before.  She has history of proctitis and anal ulcerations.  She is scheduled with [facility] on 3/6/2019.    I huddled with DARREN and she advised that patient call [facility] to see if they can get her in sooner.   If not then she can come in here to be seen.      Patient notified by phone of JV recommendation.  Patient verbalized understanding and agreed with plan.       KATERINA Yen, RN, N  [facility]) [phone]

## 2019-02-22 ENCOUNTER — HOSPITAL ENCOUNTER (OUTPATIENT)
Dept: CT IMAGING | Facility: CLINIC | Age: 54
Discharge: HOME | End: 2019-02-22
Attending: INTERNAL MEDICINE | Admitting: INTERNAL MEDICINE
Payer: COMMERCIAL

## 2019-02-22 ENCOUNTER — TRANSFERRED RECORDS (OUTPATIENT)
Dept: HEALTH INFORMATION MANAGEMENT | Facility: CLINIC | Age: 54
End: 2019-02-22

## 2019-02-22 DIAGNOSIS — I10 ESSENTIAL (PRIMARY) HYPERTENSION: ICD-10-CM

## 2019-02-22 DIAGNOSIS — Z71.3 DIETARY COUNSELING AND SURVEILLANCE: ICD-10-CM

## 2019-02-22 DIAGNOSIS — K62.5 RECTAL BLEEDING: ICD-10-CM

## 2019-02-22 DIAGNOSIS — I78.1 SPIDER ANGIOMA OF SKIN: ICD-10-CM

## 2019-02-22 DIAGNOSIS — R16.0 HEPATOMEGALY: ICD-10-CM

## 2019-02-22 LAB
HEP C HIM: NORMAL
INR PPP: 1.2 (ref 0.9–1.1)

## 2019-02-22 PROCEDURE — 74177 CT ABD & PELVIS W/CONTRAST: CPT

## 2019-02-22 PROCEDURE — 25000128 H RX IP 250 OP 636: Performed by: INTERNAL MEDICINE

## 2019-02-22 RX ORDER — IOPAMIDOL 755 MG/ML
500 INJECTION, SOLUTION INTRAVASCULAR ONCE
Status: COMPLETED | OUTPATIENT
Start: 2019-02-22 | End: 2019-02-22

## 2019-02-22 RX ADMIN — IOPAMIDOL 100 ML: 755 INJECTION, SOLUTION INTRAVENOUS at 10:36

## 2019-02-22 RX ADMIN — SODIUM CHLORIDE 65 ML: 9 INJECTION, SOLUTION INTRAVENOUS at 10:36

## 2019-03-07 ENCOUNTER — TRANSFERRED RECORDS (OUTPATIENT)
Dept: HEALTH INFORMATION MANAGEMENT | Facility: CLINIC | Age: 54
End: 2019-03-07

## 2019-03-08 ENCOUNTER — TRANSFERRED RECORDS (OUTPATIENT)
Dept: HEALTH INFORMATION MANAGEMENT | Facility: CLINIC | Age: 54
End: 2019-03-08

## 2019-03-08 LAB — INR PPP: 1.3 (ref 0.9–1.1)

## 2019-03-15 ENCOUNTER — TRANSFERRED RECORDS (OUTPATIENT)
Dept: HEALTH INFORMATION MANAGEMENT | Facility: CLINIC | Age: 54
End: 2019-03-15

## 2019-03-25 ENCOUNTER — OFFICE VISIT (OUTPATIENT)
Dept: FAMILY MEDICINE | Facility: CLINIC | Age: 54
End: 2019-03-25
Payer: COMMERCIAL

## 2019-03-25 VITALS
WEIGHT: 191 LBS | HEART RATE: 111 BPM | HEIGHT: 66 IN | BODY MASS INDEX: 30.7 KG/M2 | OXYGEN SATURATION: 100 % | TEMPERATURE: 98.9 F | SYSTOLIC BLOOD PRESSURE: 130 MMHG | DIASTOLIC BLOOD PRESSURE: 68 MMHG

## 2019-03-25 DIAGNOSIS — D50.9 IRON DEFICIENCY ANEMIA, UNSPECIFIED IRON DEFICIENCY ANEMIA TYPE: ICD-10-CM

## 2019-03-25 DIAGNOSIS — Z01.818 PREOP GENERAL PHYSICAL EXAM: Primary | ICD-10-CM

## 2019-03-25 DIAGNOSIS — F40.240 CLAUSTROPHOBIA: ICD-10-CM

## 2019-03-25 DIAGNOSIS — Z78.0 POSTMENOPAUSAL: ICD-10-CM

## 2019-03-25 DIAGNOSIS — K74.60 DIFFUSE NODULAR CIRRHOSIS OF LIVER (H): ICD-10-CM

## 2019-03-25 DIAGNOSIS — N60.99 ATYPICAL DUCTAL HYPERPLASIA OF BREAST: ICD-10-CM

## 2019-03-25 DIAGNOSIS — R74.8 ABNORMAL LIVER ENZYMES: Chronic | ICD-10-CM

## 2019-03-25 DIAGNOSIS — K62.6 ANAL ULCER: ICD-10-CM

## 2019-03-25 DIAGNOSIS — Z13.6 CARDIOVASCULAR SCREENING; LDL GOAL LESS THAN 130: ICD-10-CM

## 2019-03-25 DIAGNOSIS — K70.0 ALCOHOLIC FATTY LIVER: ICD-10-CM

## 2019-03-25 DIAGNOSIS — I10 ESSENTIAL HYPERTENSION WITH GOAL BLOOD PRESSURE LESS THAN 140/90: ICD-10-CM

## 2019-03-25 DIAGNOSIS — E80.6 ACQUIRED HYPERBILIRUBINEMIA: ICD-10-CM

## 2019-03-25 LAB
ALBUMIN SERPL-MCNC: 3.1 G/DL (ref 3.4–5)
ALP SERPL-CCNC: 160 U/L (ref 40–150)
ALT SERPL W P-5'-P-CCNC: 59 U/L (ref 0–50)
ANION GAP SERPL CALCULATED.3IONS-SCNC: 7 MMOL/L (ref 3–14)
AST SERPL W P-5'-P-CCNC: 118 U/L (ref 0–45)
BASOPHILS # BLD AUTO: 0 10E9/L (ref 0–0.2)
BASOPHILS NFR BLD AUTO: 0.5 %
BILIRUB SERPL-MCNC: 1.3 MG/DL (ref 0.2–1.3)
BUN SERPL-MCNC: 7 MG/DL (ref 7–30)
CALCIUM SERPL-MCNC: 8.5 MG/DL (ref 8.5–10.1)
CHLORIDE SERPL-SCNC: 110 MMOL/L (ref 94–109)
CO2 SERPL-SCNC: 24 MMOL/L (ref 20–32)
CREAT SERPL-MCNC: 0.53 MG/DL (ref 0.52–1.04)
DIFFERENTIAL METHOD BLD: ABNORMAL
EOSINOPHIL # BLD AUTO: 0.1 10E9/L (ref 0–0.7)
EOSINOPHIL NFR BLD AUTO: 1.9 %
ERYTHROCYTE [DISTWIDTH] IN BLOOD BY AUTOMATED COUNT: 14.5 % (ref 10–15)
GFR SERPL CREATININE-BSD FRML MDRD: >90 ML/MIN/{1.73_M2}
GLUCOSE SERPL-MCNC: 110 MG/DL (ref 70–99)
HCT VFR BLD AUTO: 39 % (ref 35–47)
HGB BLD-MCNC: 13.9 G/DL (ref 11.7–15.7)
LYMPHOCYTES # BLD AUTO: 1.2 10E9/L (ref 0.8–5.3)
LYMPHOCYTES NFR BLD AUTO: 29.3 %
MCH RBC QN AUTO: 36.8 PG (ref 26.5–33)
MCHC RBC AUTO-ENTMCNC: 35.6 G/DL (ref 31.5–36.5)
MCV RBC AUTO: 103 FL (ref 78–100)
MONOCYTES # BLD AUTO: 0.4 10E9/L (ref 0–1.3)
MONOCYTES NFR BLD AUTO: 9.8 %
NEUTROPHILS # BLD AUTO: 2.5 10E9/L (ref 1.6–8.3)
NEUTROPHILS NFR BLD AUTO: 58.5 %
PLATELET # BLD AUTO: ABNORMAL 10E9/L (ref 150–450)
POTASSIUM SERPL-SCNC: 4.1 MMOL/L (ref 3.4–5.3)
PROT SERPL-MCNC: 8.6 G/DL (ref 6.8–8.8)
RBC # BLD AUTO: 3.78 10E12/L (ref 3.8–5.2)
SODIUM SERPL-SCNC: 141 MMOL/L (ref 133–144)
WBC # BLD AUTO: 4.2 10E9/L (ref 4–11)

## 2019-03-25 PROCEDURE — 80053 COMPREHEN METABOLIC PANEL: CPT | Performed by: PHYSICIAN ASSISTANT

## 2019-03-25 PROCEDURE — 36415 COLL VENOUS BLD VENIPUNCTURE: CPT | Performed by: PHYSICIAN ASSISTANT

## 2019-03-25 PROCEDURE — 99214 OFFICE O/P EST MOD 30 MIN: CPT | Performed by: PHYSICIAN ASSISTANT

## 2019-03-25 PROCEDURE — 85025 COMPLETE CBC W/AUTO DIFF WBC: CPT | Performed by: PHYSICIAN ASSISTANT

## 2019-03-25 ASSESSMENT — MIFFLIN-ST. JEOR: SCORE: 1483.12

## 2019-03-25 NOTE — PATIENT INSTRUCTIONS
ACE Inhibitor or Angiotensin Receptor Blocker (ARB) Use - LISINOPRIL  Ace inhibitor or Angiotensin Receptor Blocker (ARB) and should HOLD this medication for the 24 hours prior to surgery.      Before Your Surgery      Call your surgeon if there is any change in your health. This includes signs of a cold or flu (such as a sore throat, runny nose, cough, rash or fever).    Do not smoke, drink alcohol or take over the counter medicine (unless your surgeon or primary care doctor tells you to) for the 24 hours before and after surgery.    If you take prescribed drugs: Follow your doctor s orders about which medicines to take and which to stop until after surgery.    Eating and drinking prior to surgery: follow the instructions from your surgeon    Take a shower or bath the night before surgery. Use the soap your surgeon gave you to gently clean your skin. If you do not have soap from your surgeon, use your regular soap. Do not shave or scrub the surgery site.  Wear clean pajamas and have clean sheets on your bed.

## 2019-03-25 NOTE — PROGRESS NOTES
[facility] PRIOR [address] [address]  [phone]  Dept: [phone]    PRE-OP EVALUATION:  Today's date: 3/25/2019    Abiola Matute (: 1965) presents for pre-operative evaluation assessment as requested by Dr. Diane Fleming.  She requires evaluation and anesthesia risk assessment prior to undergoing surgery/procedure for treatment of Rectal/anal ulcers.    Proposed Surgery/ Procedure: EXAM UNDER ANESTHESIA ANUS, BIOPSY ANAL   Date of Surgery/ Procedure: 2019  Time of Surgery/ Procedure: 10:10am  Hospital/Surgical Facility:[facility]  Primary Physician: Philly Goncalves  Type of Anesthesia Anticipated: Monitor Anesthesia Care    Patient has a Health Care Directive or Living Will:  NO    1. NO - Do you have a history of heart attack, stroke, stent, bypass or surgery on an artery in the head, neck, heart or legs?  2. NO - Do you ever have any pain or discomfort in your chest?  3. NO - Do you have a history of  Heart Failure?  4. NO - Are you troubled by shortness of breath when: walking on the level, up a slight hill or at night?  5. NO - Do you currently have a cold, bronchitis or other respiratory infection?  6. NO - Do you have a cough, shortness of breath or wheezing?  7. NO - Do you sometimes get pains in the calves of your legs when you walk?  8. NO - Do you or anyone in your family have previous history of blood clots?  9. NO - Do you or does anyone in your family have a serious bleeding problem such as prolonged bleeding following surgeries or cuts?  10. NO - Have you ever had problems with anemia or been told to take iron pills?  11. NO - Have you had any abnormal blood loss such as black, tarry or bloody stools, or abnormal vaginal bleeding?  12. NO - Have you ever had a blood transfusion?  13. NO - Have you or any of your relatives ever had problems with anesthesia?  14. NO - Do you have sleep apnea, excessive snoring or daytime  drowsiness?  15. NO - Do you have any prosthetic heart valves?  16. NO - Do you have prosthetic joints?  17. NO - Is there any chance that you may be pregnant?      HPI:     HPI related to upcoming procedure: Patient is having biopsies done on ulcerative lesions around the rectum.  She has been having rectal pain since oct/nov time.  She does not have history of anal/rectal or colon cancer.        See problem list for active medical problems.  Problems all longstanding and stable, except as noted/documented.  See ROS for pertinent symptoms related to these conditions.                                                                                                                                                          .    MEDICAL HISTORY:     Patient Active Problem List    Diagnosis Date Noted     Alcoholic fatty liver 05/14/2014     Priority: High     Acquired hyperbilirubinemia- ? secondary to alcohol use 05/14/2014     Priority: High     Atypical ductal hyperplasia of left breast 10/04/2010     Priority: High     Stool incontinence 09/30/2014     Priority: Medium     CARDIOVASCULAR SCREENING; LDL GOAL LESS THAN 130 09/30/2014     Priority: Medium     Gastroenteritis 05/20/2014     Priority: Medium     Diffuse nodular cirrhosis of liver (H) 05/20/2014     Priority: Medium     Per US while in hospital 5/20/2014:   IMPRESSION:  1. A small amount of nonspecific free perihepatic fluid.  2. Unremarkable appearance of the gallbladder.  3. No biliary dilatation.  4. The outer contour of the liver appears slightly nodular. This could  relate to cirrhosis. Additionally, there is mild diffuse fatty  infiltration of the liver.          AA (alcohol abuse) - ? ongoing  05/14/2014     Priority: Medium     Per hospital d/c summary 5/2014: Alcoholic cirrhosis of liver with small ascites and ultrasound consistent with liver nodule.  She had elevated LFTs and was higher in 2013.  Her hepatitis C and B antigens are negative and her  hepatitis A antigen and antibody also was negative.  The patient had a long history of alcohol use.  She drinks about 4 drinks every other day or every day and the patient was advised alcohol cessation and was explained that her liver was damaged although her LFTs are decreased from levels of 09/2013 but I think she already developed cirrhosis of the liver and without stopping alcohol she will progress to full blown cirrhosis of the liver with portal hypertension and understand the consequences and patient understood she needs to go to AA and alcohol treatment.  The patient was placed with alcohol withdrawal protocol with Ativan started with vitamins during her admission here.  The patient understood the consequence of continued alcohol use.         Essential hypertension with goal blood pressure less than 140/90 06/18/2013     Priority: Medium     Abnormal liver enzymes- ? related to ongoing etoh use/abuse 10/12/2012     Priority: Medium     S/P total hysterectomy and bilateral salpingo-oophorectomy 09/26/2012     Priority: Medium     Claustrophobia 09/19/2011     Priority: Medium     Postmenopausal- s/p hysterectomy with BSO - not on HRT secondary to atypical ductal hyperplasia & breast pain -no paps needed 08/17/2011     Priority: Medium     Idiopathic thrombocytopenia (H) 03/01/2011     Priority: Medium     Rosacea 08/24/2010     Priority: Medium     Iron deficiency anemia, unspecified iron deficiency anemia type 05/08/2006     Priority: Medium     Problem list name updated by automated process. Provider to review       Intestinal infection due to Clostridium difficile 03/09/2006     Priority: Medium     Other isolated or specific phobias 12/06/2005     Priority: Medium     fear of flying - gets Ativan prn.        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 Provider to review and confirm.       Contact dermatitis and other eczema, due to  unspecified cause 12/06/2005     Priority: Medium     perianal  - recurrent - clobetasol        Non morbid obesity due to excess calories 12/23/2003     Priority: Medium     Problem list name updated by automated process. Provider to review        Past Medical History:   Diagnosis Date     Atypical ductal hyperplasia of breast 9/10/10    ERT not recommended -left - and flat epithelial atypia-scheduled for breast biopsy 9/17/2010      BIFID UVULA (aka UVULA)       Contact dermatitis and other eczema, due to unspecified cause     perianal  - recurrent - clobetasol      Hypertension      Obesity, unspecified      Other isolated or specific phobias     fear of flying - gets Ativan prn.      Past Surgical History:   Procedure Laterality Date     BREAST BIOPSY, RT/LT  9/17/2010    left - scheduled with Dr. Kojo WILKERSON APPENDECTOMY  at age 14     COLONOSCOPY  2006     COLONOSCOPY N/A 12/23/2014    Procedure: COMBINED COLONOSCOPY, SINGLE OR MULTIPLE BIOPSY/POLYPECTOMY BY BIOPSY;  Surgeon: Diane Fleming MD;  Location:  GI     HC CLOSED TX BIMALLEOLAR ANKLE FX W/O MANIPULATION  at age 27    left ankle ORIF, plates and screws removed at age 36     HYSTERECTOMY, VAGINAL  2006    with Dr. Licha Zhou - with BSO for fibroids      SURGICAL HISTORY OF -   4/15/2010    Pelviscopy with removal of bilateral hydrosalpinges.      Current Outpatient Medications   Medication Sig Dispense Refill     Calcium Carbonate (CALCIUM 500 PO) Take 1 tablet by mouth daily        cholecalciferol (VITAMIN D) 1000 UNIT tablet Take 1 tablet by mouth daily.       lisinopril (PRINIVIL/ZESTRIL) 10 MG tablet TAKE ONE TABLET BY MOUTH DAILY 90 tablet 3     LORazepam (ATIVAN) 0.5 MG tablet Take 1 tablet by mouth. 1-2 tabs 30-60 minutes prior to exam/procedure/flight 10 tablet 0     Multiple Vitamins-Minerals (MULTIVITAMIN & MINERAL PO) Take  by mouth daily.       OTC products: herbals and vitamins - Cinnamon and Tumeric    Allergies   Allergen  "Reactions     Fish Oil      Redness and itching around eye area only - went away when fish oil capsules stopped      Metronidazole      pain/itching     Naphthalenemethylamines      Lamisil = mild urticarial reaction     Penicillins Cramps     Vomiting     Ppd [Tuberculin Purified Protein Derivative]      Sulfa Drugs      hives      Latex Allergy: NO    Social History     Tobacco Use     Smoking status: Never Smoker     Smokeless tobacco: Never Used   Substance Use Topics     Alcohol use: Yes     Alcohol/week: 0.0 oz     Comment: 10-12 drinks per week     History   Drug Use No     Comment: no herbal meds either       REVIEW OF SYSTEMS:   Constitutional, neuro, ENT, endocrine, pulmonary, cardiac, gastrointestinal, genitourinary, musculoskeletal, integument and psychiatric systems are negative, except as otherwise noted.    EXAM:   /68 (BP Location: Right arm, Patient Position: Chair, Cuff Size: Adult Large)   Pulse 111   Temp 98.9  F (37.2  C) (Oral)   Ht 1.676 m (5' 6\")   Wt 86.6 kg (191 lb)   LMP 05/31/2006   SpO2 100%   Breastfeeding? No   BMI 30.83 kg/m      GENERAL APPEARANCE: healthy, alert and no distress     EYES: EOMI, PERRL     HENT: ear canals and TM's normal and nose and mouth without ulcers or lesions     NECK: no adenopathy, no asymmetry, masses, or scars and thyroid normal to palpation     RESP: lungs clear to auscultation - no rales, rhonchi or wheezes     CV: regular rates and rhythm, normal S1 S2, no S3 or S4 and no murmur, click or rub     ABDOMEN:  soft, nontender, no HSM or masses and bowel sounds normal     MS: extremities normal- no gross deformities noted, no evidence of inflammation in joints, FROM in all extremities.     SKIN: no suspicious lesions or rashes     NEURO: Normal strength and tone, sensory exam grossly normal, mentation intact and speech normal     PSYCH: mentation appears normal. and affect normal/bright     LYMPHATICS: No cervical adenopathy    DIAGNOSTICS: "     Labs Resulted Today:   Results for orders placed or performed in visit on 03/25/19   CBC with platelets differential   Result Value Ref Range    WBC 4.2 4.0 - 11.0 10e9/L    RBC Count 3.78 (L) 3.8 - 5.2 10e12/L    Hemoglobin 13.9 11.7 - 15.7 g/dL    Hematocrit 39.0 35.0 - 47.0 %     (H) 78 - 100 fl    MCH 36.8 (H) 26.5 - 33.0 pg    MCHC 35.6 31.5 - 36.5 g/dL    RDW 14.5 10.0 - 15.0 %    Platelet Count Results confirmed by repeat test 150 - 450 10e9/L    Diff Method Automated Method     % Neutrophils 58.5 %    % Lymphocytes 29.3 %    % Monocytes 9.8 %    % Eosinophils 1.9 %    % Basophils 0.5 %    Absolute Neutrophil 2.5 1.6 - 8.3 10e9/L    Absolute Lymphocytes 1.2 0.8 - 5.3 10e9/L    Absolute Monocytes 0.4 0.0 - 1.3 10e9/L    Absolute Eosinophils 0.1 0.0 - 0.7 10e9/L    Absolute Basophils 0.0 0.0 - 0.2 10e9/L     Labs Drawn and in Process:   Unresulted Labs Ordered in the Past 30 Days of this Admission     Date and Time Order Name Status Description    3/25/2019 1009 COMPREHENSIVE METABOLIC PANEL In process           Recent Labs   Lab Test 02/22/19 12/07/18  1204 11/02/17  1609 09/27/17  1559 09/27/17  0902   HGB  --  13.0  --  13.3  --    PLT  --  106*  --  71*  --    INR 1.2*  --   --   --   --    NA  --  134 137 126*  --    POTASSIUM  --  3.5 4.1 4.1  --    CR  --  0.53 0.58 0.55  --    A1C  --   --   --   --  4.6        IMPRESSION:   Reason for surgery/procedure: Rectal Ulcers - Biopsy  Diagnosis/reason for consult: Pre-op for surgical clearance    The proposed surgical procedure is considered INTERMEDIATE risk.    REVISED CARDIAC RISK INDEX  The patient has the following serious cardiovascular risks for perioperative complications such as (MI, PE, VFib and 3  AV Block):  No serious cardiac risks  INTERPRETATION: 0 risks: Class I (very low risk - 0.4% complication rate)    The patient has the following additional risks for perioperative complications:  Patient has alcohol abuse on problem list, but  reports she has not had a drink of alcohol in about 2-3 weeks.        ICD-10-CM    1. Preop general physical exam Z01.818    2. Anal ulcer K62.6    3. Diffuse nodular cirrhosis of liver (H) K74.60 Comprehensive metabolic panel   4. Abnormal liver enzymes- ? related to ongoing etoh use/abuse R74.8 Comprehensive metabolic panel   5. Alcoholic fatty liver K70.0 Comprehensive metabolic panel   6. Acquired hyperbilirubinemia- ? secondary to alcohol use E80.6 Comprehensive metabolic panel   7. Essential hypertension with goal blood pressure less than 140/90 I10    8. Atypical ductal hyperplasia of left breast N60.99    9. Iron deficiency anemia, unspecified iron deficiency anemia type D50.9 CBC with platelets differential   10. Postmenopausal- s/p hysterectomy with BSO - not on HRT secondary to atypical ductal hyperplasia & breast pain -no paps needed Z78.0    11. Claustrophobia F40.240    12. CARDIOVASCULAR SCREENING; LDL GOAL LESS THAN 130 Z13.6        RECOMMENDATIONS:     --Patient is to take all scheduled medications on the day of surgery EXCEPT for modifications listed below.    ACE Inhibitor or Angiotensin Receptor Blocker (ARB) Use  Ace inhibitor or Angiotensin Receptor Blocker (ARB) and should HOLD this medication for the 24 hours prior to surgery.      APPROVAL GIVEN to proceed with proposed procedure, without further diagnostic evaluation       Signed Electronically by: Amber Castro PA-C        I have reviewed this H&P and any associated studies and agree with Amber Castro PA-C's assessment and plan.        Luis Victor MD         Copy of this evaluation report is provided to requesting physician.    [facility] Preop Guidelines    Revised Cardiac Risk Index

## 2019-03-28 ENCOUNTER — ANESTHESIA (OUTPATIENT)
Dept: SURGERY | Facility: CLINIC | Age: 54
End: 2019-03-28
Payer: COMMERCIAL

## 2019-03-28 ENCOUNTER — ANESTHESIA EVENT (OUTPATIENT)
Dept: SURGERY | Facility: CLINIC | Age: 54
End: 2019-03-28
Payer: COMMERCIAL

## 2019-03-28 ENCOUNTER — HOSPITAL ENCOUNTER (OUTPATIENT)
Facility: CLINIC | Age: 54
Discharge: HOME | End: 2019-03-28
Attending: COLON & RECTAL SURGERY | Admitting: COLON & RECTAL SURGERY
Payer: COMMERCIAL

## 2019-03-28 VITALS
HEART RATE: 93 BPM | TEMPERATURE: 98 F | SYSTOLIC BLOOD PRESSURE: 142 MMHG | WEIGHT: 186.8 LBS | DIASTOLIC BLOOD PRESSURE: 93 MMHG | OXYGEN SATURATION: 96 % | BODY MASS INDEX: 30.02 KG/M2 | RESPIRATION RATE: 16 BRPM | HEIGHT: 66 IN

## 2019-03-28 DIAGNOSIS — K62.6 ANAL ULCER: Primary | ICD-10-CM

## 2019-03-28 LAB
GRAM STN SPEC: NORMAL
GRAM STN SPEC: NORMAL
Lab: NORMAL
SPECIMEN SOURCE: NORMAL

## 2019-03-28 PROCEDURE — 88312 SPECIAL STAINS GROUP 1: CPT | Mod: 26 | Performed by: COLON & RECTAL SURGERY

## 2019-03-28 PROCEDURE — 25800030 ZZH RX IP 258 OP 636: Performed by: NURSE ANESTHETIST, CERTIFIED REGISTERED

## 2019-03-28 PROCEDURE — 37000008 ZZH ANESTHESIA TECHNICAL FEE, 1ST 30 MIN: Performed by: COLON & RECTAL SURGERY

## 2019-03-28 PROCEDURE — 88342 IMHCHEM/IMCYTCHM 1ST ANTB: CPT | Performed by: COLON & RECTAL SURGERY

## 2019-03-28 PROCEDURE — 87999 UNLISTED MICROBIOLOGY PX: CPT | Performed by: COLON & RECTAL SURGERY

## 2019-03-28 PROCEDURE — 87077 CULTURE AEROBIC IDENTIFY: CPT | Performed by: COLON & RECTAL SURGERY

## 2019-03-28 PROCEDURE — 87070 CULTURE OTHR SPECIMN AEROBIC: CPT | Mod: 91 | Performed by: COLON & RECTAL SURGERY

## 2019-03-28 PROCEDURE — 87205 SMEAR GRAM STAIN: CPT | Performed by: COLON & RECTAL SURGERY

## 2019-03-28 PROCEDURE — 71000012 ZZH RECOVERY PHASE 1 LEVEL 1 FIRST HR: Performed by: COLON & RECTAL SURGERY

## 2019-03-28 PROCEDURE — 36000050 ZZH SURGERY LEVEL 2 1ST 30 MIN: Performed by: COLON & RECTAL SURGERY

## 2019-03-28 PROCEDURE — 71000013 ZZH RECOVERY PHASE 1 LEVEL 1 EA ADDTL HR: Performed by: COLON & RECTAL SURGERY

## 2019-03-28 PROCEDURE — 37000009 ZZH ANESTHESIA TECHNICAL FEE, EACH ADDTL 15 MIN: Performed by: COLON & RECTAL SURGERY

## 2019-03-28 PROCEDURE — 88305 TISSUE EXAM BY PATHOLOGIST: CPT | Performed by: COLON & RECTAL SURGERY

## 2019-03-28 PROCEDURE — 40000170 ZZH STATISTIC PRE-PROCEDURE ASSESSMENT II: Performed by: COLON & RECTAL SURGERY

## 2019-03-28 PROCEDURE — 87176 TISSUE HOMOGENIZATION CULTR: CPT | Performed by: COLON & RECTAL SURGERY

## 2019-03-28 PROCEDURE — 87081 CULTURE SCREEN ONLY: CPT | Performed by: COLON & RECTAL SURGERY

## 2019-03-28 PROCEDURE — 27210794 ZZH OR GENERAL SUPPLY STERILE: Performed by: COLON & RECTAL SURGERY

## 2019-03-28 PROCEDURE — 25000132 ZZH RX MED GY IP 250 OP 250 PS 637: Performed by: COLON & RECTAL SURGERY

## 2019-03-28 PROCEDURE — 25000128 H RX IP 250 OP 636: Performed by: NURSE ANESTHETIST, CERTIFIED REGISTERED

## 2019-03-28 PROCEDURE — 87076 CULTURE ANAEROBE IDENT EACH: CPT | Performed by: COLON & RECTAL SURGERY

## 2019-03-28 PROCEDURE — 25000566 ZZH SEVOFLURANE, EA 15 MIN: Performed by: COLON & RECTAL SURGERY

## 2019-03-28 PROCEDURE — 88312 SPECIAL STAINS GROUP 1: CPT | Performed by: COLON & RECTAL SURGERY

## 2019-03-28 PROCEDURE — 25000125 ZZHC RX 250: Performed by: COLON & RECTAL SURGERY

## 2019-03-28 PROCEDURE — 36000052 ZZH SURGERY LEVEL 2 EA 15 ADDTL MIN: Performed by: COLON & RECTAL SURGERY

## 2019-03-28 PROCEDURE — 88305 TISSUE EXAM BY PATHOLOGIST: CPT | Mod: 26 | Performed by: COLON & RECTAL SURGERY

## 2019-03-28 PROCEDURE — 25000132 ZZH RX MED GY IP 250 OP 250 PS 637: Performed by: ANESTHESIOLOGY

## 2019-03-28 PROCEDURE — 25000125 ZZHC RX 250: Performed by: NURSE ANESTHETIST, CERTIFIED REGISTERED

## 2019-03-28 PROCEDURE — 87186 SC STD MICRODIL/AGAR DIL: CPT | Performed by: COLON & RECTAL SURGERY

## 2019-03-28 PROCEDURE — 71000027 ZZH RECOVERY PHASE 2 EACH 15 MINS: Performed by: COLON & RECTAL SURGERY

## 2019-03-28 PROCEDURE — 87075 CULTR BACTERIA EXCEPT BLOOD: CPT | Mod: XU | Performed by: COLON & RECTAL SURGERY

## 2019-03-28 RX ORDER — FENTANYL CITRATE 50 UG/ML
INJECTION, SOLUTION INTRAMUSCULAR; INTRAVENOUS PRN
Status: DISCONTINUED | OUTPATIENT
Start: 2019-03-28 | End: 2019-03-28

## 2019-03-28 RX ORDER — ONDANSETRON 2 MG/ML
INJECTION INTRAMUSCULAR; INTRAVENOUS PRN
Status: DISCONTINUED | OUTPATIENT
Start: 2019-03-28 | End: 2019-03-28

## 2019-03-28 RX ORDER — ACETAMINOPHEN 325 MG/1
325-650 TABLET ORAL EVERY 6 HOURS PRN
Status: ON HOLD | COMMUNITY
End: 2019-10-21

## 2019-03-28 RX ORDER — OXYCODONE HYDROCHLORIDE 5 MG/1
5 TABLET ORAL EVERY 4 HOURS PRN
Status: DISCONTINUED | OUTPATIENT
Start: 2019-03-28 | End: 2019-03-28 | Stop reason: HOSPADM

## 2019-03-28 RX ORDER — LIDOCAINE HYDROCHLORIDE 20 MG/ML
INJECTION, SOLUTION INFILTRATION; PERINEURAL PRN
Status: DISCONTINUED | OUTPATIENT
Start: 2019-03-28 | End: 2019-03-28

## 2019-03-28 RX ORDER — KETOROLAC TROMETHAMINE 30 MG/ML
INJECTION, SOLUTION INTRAMUSCULAR; INTRAVENOUS PRN
Status: DISCONTINUED | OUTPATIENT
Start: 2019-03-28 | End: 2019-03-28

## 2019-03-28 RX ORDER — HYDROCODONE BITARTRATE AND ACETAMINOPHEN 5; 325 MG/1; MG/1
1-2 TABLET ORAL EVERY 4 HOURS PRN
Qty: 15 TABLET | Refills: 0 | Status: SHIPPED | OUTPATIENT
Start: 2019-03-28 | End: 2019-09-23

## 2019-03-28 RX ORDER — BUPIVACAINE HYDROCHLORIDE AND EPINEPHRINE 2.5; 5 MG/ML; UG/ML
INJECTION, SOLUTION INFILTRATION; PERINEURAL PRN
Status: DISCONTINUED | OUTPATIENT
Start: 2019-03-28 | End: 2019-03-28 | Stop reason: HOSPADM

## 2019-03-28 RX ORDER — PROPOFOL 10 MG/ML
INJECTION, EMULSION INTRAVENOUS PRN
Status: DISCONTINUED | OUTPATIENT
Start: 2019-03-28 | End: 2019-03-28

## 2019-03-28 RX ORDER — ACETAMINOPHEN 325 MG/1
975 TABLET ORAL ONCE
Status: COMPLETED | OUTPATIENT
Start: 2019-03-28 | End: 2019-03-28

## 2019-03-28 RX ORDER — SODIUM CHLORIDE, SODIUM LACTATE, POTASSIUM CHLORIDE, CALCIUM CHLORIDE 600; 310; 30; 20 MG/100ML; MG/100ML; MG/100ML; MG/100ML
INJECTION, SOLUTION INTRAVENOUS CONTINUOUS PRN
Status: DISCONTINUED | OUTPATIENT
Start: 2019-03-28 | End: 2019-03-28

## 2019-03-28 RX ORDER — ONDANSETRON 4 MG/1
4 TABLET, ORALLY DISINTEGRATING ORAL
Status: DISCONTINUED | OUTPATIENT
Start: 2019-03-28 | End: 2019-03-28 | Stop reason: HOSPADM

## 2019-03-28 RX ORDER — DEXAMETHASONE SODIUM PHOSPHATE 4 MG/ML
INJECTION, SOLUTION INTRA-ARTICULAR; INTRALESIONAL; INTRAMUSCULAR; INTRAVENOUS; SOFT TISSUE PRN
Status: DISCONTINUED | OUTPATIENT
Start: 2019-03-28 | End: 2019-03-28

## 2019-03-28 RX ADMIN — MIDAZOLAM 1 MG: 1 INJECTION INTRAMUSCULAR; INTRAVENOUS at 09:53

## 2019-03-28 RX ADMIN — DEXAMETHASONE SODIUM PHOSPHATE 4 MG: 4 INJECTION, SOLUTION INTRA-ARTICULAR; INTRALESIONAL; INTRAMUSCULAR; INTRAVENOUS; SOFT TISSUE at 10:06

## 2019-03-28 RX ADMIN — FENTANYL CITRATE 50 MCG: 50 INJECTION, SOLUTION INTRAMUSCULAR; INTRAVENOUS at 09:57

## 2019-03-28 RX ADMIN — ONDANSETRON 4 MG: 2 INJECTION INTRAMUSCULAR; INTRAVENOUS at 10:12

## 2019-03-28 RX ADMIN — SUCCINYLCHOLINE CHLORIDE 100 MG: 20 INJECTION, SOLUTION INTRAMUSCULAR; INTRAVENOUS; PARENTERAL at 09:57

## 2019-03-28 RX ADMIN — HYDROMORPHONE HYDROCHLORIDE 0.5 MG: 1 INJECTION, SOLUTION INTRAMUSCULAR; INTRAVENOUS; SUBCUTANEOUS at 10:09

## 2019-03-28 RX ADMIN — OXYCODONE HYDROCHLORIDE 5 MG: 5 TABLET ORAL at 11:36

## 2019-03-28 RX ADMIN — SODIUM CHLORIDE, POTASSIUM CHLORIDE, SODIUM LACTATE AND CALCIUM CHLORIDE: 600; 310; 30; 20 INJECTION, SOLUTION INTRAVENOUS at 09:53

## 2019-03-28 RX ADMIN — LIDOCAINE HYDROCHLORIDE 100 MG: 20 INJECTION, SOLUTION INFILTRATION; PERINEURAL at 09:57

## 2019-03-28 RX ADMIN — KETOROLAC TROMETHAMINE 30 MG: 30 INJECTION, SOLUTION INTRAMUSCULAR at 10:26

## 2019-03-28 RX ADMIN — PROPOFOL 200 MG: 10 INJECTION, EMULSION INTRAVENOUS at 09:57

## 2019-03-28 RX ADMIN — FENTANYL CITRATE 50 MCG: 50 INJECTION, SOLUTION INTRAMUSCULAR; INTRAVENOUS at 10:04

## 2019-03-28 RX ADMIN — ACETAMINOPHEN 975 MG: 325 TABLET, FILM COATED ORAL at 09:47

## 2019-03-28 ASSESSMENT — MIFFLIN-ST. JEOR: SCORE: 1464.07

## 2019-03-28 NOTE — ANESTHESIA CARE TRANSFER NOTE
Patient: Abiola Matute    Procedure(s):  EXAM UNDER ANESTHESIA ANUS  anal biopsy and culure placement of seton    Diagnosis: CHRONIC ULCER OF SKIN WITH FAT LAYER EXPOSED  Diagnosis Additional Information: No value filed.    Anesthesia Type:   General, RSI, ETT     Note:  Airway :Face Mask and Room Air  Patient transferred to:PACU  Comments: Neuromuscular blockade not used after succinylcholine for intubation, spontaneous return of TOF 4/4 with sustained tetany, spontaneous respirations, adequate tidal volumes, followed commands to voice, oropharynx suctioned with soft flexible catheter, extubated atraumatically, extubated with suction, airway patent after extubation.  Oxygen via facemask at 6 liters per minute to PACU. SpO2, NiBP, and EKG monitors and alarms on and functioning, report on patient's clinical status given to PACU RN, RN questions answered. Handoff Report: Identifed the Patient, Identified the Reponsible Provider, Reviewed the pertinent medical history, Discussed the surgical course, Reviewed Intra-OP anesthesia mangement and issues during anesthesia, Set expectations for post-procedure period and Allowed opportunity for questions and acknowledgement of understanding      Vitals: (Last set prior to Anesthesia Care Transfer)    CRNA VITALS  3/28/2019 1005 - 3/28/2019 1039      3/28/2019             NIBP:  161/103  (Abnormal)     NIBP Mean:  114    Resp Rate (set):  10                Electronically Signed By: DARYN Lei CRNA  March 28, 2019  10:39 AM

## 2019-03-28 NOTE — BRIEF OP NOTE
[facility]    Brief Operative Note    Pre-operative diagnosis: CHRONIC ULCER OF SKIN WITH FAT LAYER EXPOSED  Post-operative diagnosis anal ulcerations, anterior anal fistula  Procedure: Procedure(s):  EXAM UNDER ANESTHESIA ANUS  anal biopsy and culure placement of seton  Surgeon: Surgeon(s) and Role:     * Diane Fleming MD - Primary  Anesthesia: Monitor Anesthesia Care   Estimated blood loss: Minimal  Drains: seton  Specimens:   ID Type Source Tests Collected by Time Destination   1 : rule out actinomycosis, anal ulcer Tissue Other ANAEROBIC BACTERIAL CULTURE, GRAM STAIN, TISSUE CULTURE AEROBIC BACTERIAL, CYTOMEGALOVIRUS QUANT PCR NON BLOOD, LABORATORY MISCELLANEOUS ORDER Diane Fleming MD 3/28/2019 10:03 AM    2 : anal ulcer Tissue Other TISSUE CULTURE AEROBIC BACTERIAL Diane Fleming MD 3/28/2019 10:21 AM    A : anal ulcer Tissue Other SURGICAL PATHOLOGY EXAM Diane Fleming MD 3/28/2019 10:22 AM      Findings:  Three perirectal ulcerations (right side 3 x1 posteriorly, 4 x2 laterally and 3 x1 anteriorly. The posterior and lateral ones connect under a skin bridge. The anterior and lateral ones connect to an internal opening in the anterior midline.   Complications: None.  Implants: None.

## 2019-03-28 NOTE — ANESTHESIA POSTPROCEDURE EVALUATION
Patient: Abiola Matute    Procedure(s):  EXAM UNDER ANESTHESIA ANUS  anal biopsy and culure placement of seton    Diagnosis:CHRONIC ULCER OF SKIN WITH FAT LAYER EXPOSED  Diagnosis Additional Information: No value filed.    Anesthesia Type:  General, RSI, ETT    Note:  Anesthesia Post Evaluation    Patient location during evaluation: PACU  Patient participation: Able to fully participate in evaluation  Level of consciousness: awake  Pain management: adequate  Airway patency: patent  Cardiovascular status: acceptable  Respiratory status: acceptable  Hydration status: acceptable  PONV: none     Anesthetic complications: None          Last vitals:  Vitals:    03/28/19 1130 03/28/19 1145 03/28/19 1235   BP: (!) 133/92 129/86 (!) 142/93   Pulse: 100 93    Resp: 11 11 16   Temp:  36.7  C (98  F)    SpO2: 97% 96%          Electronically Signed By: Joyce Pace  March 28, 2019  1:50 PM

## 2019-03-28 NOTE — ANESTHESIA PREPROCEDURE EVALUATION
Anesthesia Pre-Procedure Evaluation    Patient: Abiola Matute   MRN: [id number] : 1965          Preoperative Diagnosis: CHRONIC ULCER OF SKIN WITH FAT LAYER EXPOSED    Procedure(s):  EXAM UNDER ANESTHESIA ANUS  BIOPSY ANAL    Past Medical History:   Diagnosis Date     Atypical ductal hyperplasia of breast 9/10/10    ERT not recommended -left - and flat epithelial atypia-scheduled for breast biopsy 2010      BIFID UVULA (aka UVULA)       Contact dermatitis and other eczema, due to unspecified cause     perianal  - recurrent - clobetasol      Hypertension      Obesity, unspecified      Other isolated or specific phobias     fear of flying - gets Ativan prn.      Past Surgical History:   Procedure Laterality Date     BREAST BIOPSY, RT/LT  2010    left - scheduled with Dr. Kojo WILKERSON APPENDECTOMY  at age 14     COLONOSCOPY       COLONOSCOPY N/A 2014    Procedure: COMBINED COLONOSCOPY, SINGLE OR MULTIPLE BIOPSY/POLYPECTOMY BY BIOPSY;  Surgeon: Diane Fleming MD;  Location:  GI     HC CLOSED TX BIMALLEOLAR ANKLE FX W/O MANIPULATION  at age 27    left ankle ORIF, plates and screws removed at age 36     HYSTERECTOMY, VAGINAL      with Dr. Licha Zhou - with BSO for fibroids      SURGICAL HISTORY OF -   4/15/2010    Pelviscopy with removal of bilateral hydrosalpinges.        Anesthesia Evaluation     .             ROS/MED HX    ENT/Pulmonary:       Neurologic:       Cardiovascular:     (+) hypertension----. : . . . :. .       METS/Exercise Tolerance:     Hematologic:         Musculoskeletal:         GI/Hepatic: Comment: cirrhosis    (+) liver disease,       Renal/Genitourinary:         Endo:     (+) Obesity, .      Psychiatric:         Infectious Disease:         Malignancy:         Other: Comment: EtOH abuse                                Lab Results   Component Value Date    WBC 4.2 2019    HGB 13.9 2019    HCT 39.0 2019    PLT Results confirmed by repeat  "test 03/25/2019    SED 26 (H) 02/08/2010     03/25/2019    POTASSIUM 4.1 03/25/2019    CHLORIDE 110 (H) 03/25/2019    CO2 24 03/25/2019    BUN 7 03/25/2019    CR 0.53 03/25/2019     (H) 03/25/2019    ARIEL 8.5 03/25/2019    MAG 2.0 05/21/2014    ALBUMIN 3.1 (L) 03/25/2019    PROTTOTAL 8.6 03/25/2019    ALT 59 (H) 03/25/2019     (H) 03/25/2019     (H) 11/02/2017    ALKPHOS 160 (H) 03/25/2019    BILITOTAL 1.3 03/25/2019    LIPASE 414 (H) 05/20/2014    INR 1.3 (H) 03/08/2019    TSH 1.56 12/07/2018    HCG Negative 06/07/2006       Preop Vitals  BP Readings from Last 3 Encounters:   03/28/19 162/89   03/25/19 130/68   12/07/18 135/78    Pulse Readings from Last 3 Encounters:   03/25/19 111   09/20/18 90   09/01/18 95      Resp Readings from Last 3 Encounters:   03/28/19 16   08/14/15 12   12/23/14 24    SpO2 Readings from Last 3 Encounters:   03/28/19 99%   03/25/19 100%   09/20/18 98%      Temp Readings from Last 1 Encounters:   03/28/19 36.3  C (97.3  F) (Oral)    Ht Readings from Last 1 Encounters:   03/28/19 1.676 m (5' 6\")      Wt Readings from Last 1 Encounters:   03/28/19 84.7 kg (186 lb 12.8 oz)    Estimated body mass index is 30.15 kg/m  as calculated from the following:    Height as of this encounter: 1.676 m (5' 6\").    Weight as of this encounter: 84.7 kg (186 lb 12.8 oz).       Anesthesia Plan      History & Physical Review  History and physical reviewed and following examination; no interval change.    ASA Status:  3 .    NPO Status:  > 8 hours    Plan for General, RSI and ETT with Intravenous and Propofol induction.   PONV prophylaxis:  Ondansetron (or other 5HT-3) and Dexamethasone or Solumedrol       Postoperative Care  Postoperative pain management:  IV analgesics and Multi-modal analgesia.      Consents  Anesthetic plan, risks, benefits and alternatives discussed with:  Patient..                 Nomi Schaefer, DO  "

## 2019-03-28 NOTE — DISCHARGE INSTRUCTIONS
Today you received Toradol, an antiinflammatory medication similar to Ibuprofen.  You should not take other antiinflammatory medication, such as Ibuprofen, Motrin, Advil, Aleve, Naprosyn, etc until 4:30 pm tonight.         Same Day Surgery Discharge Instructions for  Sedation and General Anesthesia       It's not unusual to feel dizzy, light-headed or faint for up to 24 hours after surgery or while taking pain medication.  If you have these symptoms: sit for a few minutes before standing and have someone assist you when you get up to walk or use the bathroom.      You should rest and relax for the next 24 hours. We recommend you make arrangements to have an adult stay with you for at least 24 hours after your discharge.  Avoid hazardous and strenuous activity.      DO NOT DRIVE any vehicle or operate mechanical equipment for 24 hours following the end of your surgery.  Even though you may feel normal, your reactions may be affected by the medication you have received.      Do not drink alcoholic beverages for 24 hours following surgery.       Slowly progress to your regular diet as you feel able. It's not unusual to feel nauseated and/or vomit after receiving anesthesia.  If you develop these symptoms, drink clear liquids (apple juice, ginger ale, broth, 7-up, etc. ) until you feel better.  If your nausea and vomiting persists for 24 hours, please notify your surgeon.        All narcotic pain medications, along with inactivity and anesthesia, can cause constipation. Drinking plenty of liquids and increasing fiber intake will help.      For any questions of a medical nature, call your surgeon.      Do not make important decisions for 24 hours.      If you had general anesthesia, you may have a sore throat for a couple of days related to the breathing tube used during surgery.  You may use Cepacol lozenges to help with this discomfort.  If it worsens or if you develop a fever, contact your surgeon.       If you feel  your pain is not well managed with the pain medications prescribed by your surgeon, please contact your surgeon's office to let them know so they can address your concerns.           Discharge Instructions Following Anorectal Surgery  [facility]  [phone]  Medication:     You may be prescribed a narcotic pain relievers such as: Vicodin, Percocet, and Tylenol # 3.  You should reduce your use of these medications as your pain improves.  You may be prescribed an anal ointment (such as Americain, Tronothane, or Xylocaine). Apply the ointment to the anal area with your finger, then cover the area with cotton or gauze.  Stool softeners (Miralax) are to be taken in a glass of water once in the morning with increased water intake throughout the day.   Bowel function:   It is important to have soft bowel movements at least every other day and to keep your stool soft. Constipation and/or diarrhea can make the pain worse and must be avoided.   Constipation:  You should have a bowel movement at least every other day.  If two days pass without one, take one tablespoon of milk of magnesia; if there is no result, repeat this dose in six hours.   Diarrhea:  Diarrhea, usually caused by the overuse of laxatives, is also a concern. If you have more than three watery bowel movements during a 24 hour period, stop taking milk of magnesia or laxatives.  If diarrhea persists, call your doctor.   Bathing:  After bowel movements, use a wet washcloth, toilet paper or cotton to clean yourself.  If possible take a sitz bath or tub bath immediately. Baths should last between 10-15 minutes with the water as warm as you can comfortably tolerate. Try to take at least three sitz baths (or showers with hand-held sprayer) every day.   Discharge/Infection:  Bloody discharge after bowel movements is normal and may last 2 to 4 weeks after your surgery. However, if you have bleeding between bowel movements that doesn't stop,  call the office.  Urination:  If you have trouble urinating, do so while sitting in a tub of water, or run the water faucet while sitting on the toilet. If you are unable to urinate 6-8 hours after surgery, call the office.  Diet:  A high fiber diet, including at least four servings of fruits or vegetables daily, will help to keep your bowel movements regular and soft. It is important to drink six to eight glasses of water or juice everyday when using fiber products.   Activity:    Activity as tolerated. After some surgeries, you may be told not to perform any lifting (more than 10 pounds) for several weeks after surgery.    Call the office if you have:  Fever greater than 101   Chills   Foul-smelling drainage   Nausea and vomiting   Diarrhea - greater than 3 water stools in 24 hours   Constipation - no bowel movement for 3 days   Severe bleeding that does not stop soon after a bowel movement   Problems with the incision, including increased pain, swelling, redness, or drainage.  Difficulty urinating        **If you have questions or concerns about your procedure,  call Dr. Fleming at [phone]**

## 2019-03-29 NOTE — OP NOTE
Procedure Date: 03/28/2019      PREOPERATIVE DIAGNOSIS:  Anal ulcerations plus anal fistula.      POSTOPERATIVE DIAGNOSIS:  Anal ulcerations plus anal fistula.         PROCEDURE:  Examination under anesthesia, culture and biopsy of ulcerations, and placement of a seton.      SURGEON:  Diane Fleming MD      ASSISTANT:  None.      ANESTHESIA:  General.      INDICATIONS:  The patient is a 53-year-old woman who has had difficulty recently with progressively enlarging and progressively more painful ulcerations near her anal opening.  These have progressively enlarged and become more tender.  She has undergone a limited GI workup with flexible sigmoidoscopy that was normal other than the presence of this ulceration.  Because of the lack of a diagnosis and progression of her symptoms and an examination under anesthesia with biopsies and cultures were recommended to the patient.  The indications, procedure, and risks were reviewed.  She understands and wishes to proceed.      OPERATIVE PROCEDURE:  The patient was brought to the operating room.  Her legs were placed in pneumatic compression stockings.  She then was placed in the prone jackknife position with her buttocks over a roll.  After she received sedation, her buttocks were taped apart for exposure.  The area was prepped and draped in the usual sterile manner.  A time-out was performed, and everyone present in the operating room agreed to the planned procedure.  A perianal nerve block using 50% Marcaine was induced.  We then inspected the anal canal, revealed multiple small skin tags at the anal verge, extending in a spoke-like manner there were 2 deep ulcerations, 1 in the left posterior lateral quadrant which measured 4 x 1 cm.  The edges are heaped up and somewhat undermined.  Then in the left lateral quadrant, there was a 3 x 2 cm open wound with beefy granulation tissue at the base.  Finally, anteriorly, there was a 2 x 1 cm ulceration.  Between the left posterior  and the left lateral ulceration, there is a 0.5 cm skin bridge.  Underneath that, there was granulation tissue which functionally makes the left posterior and the left lateral wounds contiguous.  The edges are somewhat thickened, but there are not hard.  There was no area of discoloration.  In the left lateral wound bed, there was a divot.  Placement of a fistula probe into that divot tracked to the anterior midline of the anal canal.  That internal opening also tracked to the anterior midline wound.  There is beefy granulation tissue at the base of all 3 wounds.  The base of the wounds were cultured for Gram statin, anaerobic and aerobic cultures.  Then portion of the base of multiple wounds was excised and sent for culture and biopsy.  One of the edges of the fistula tract was also excised and retrieved for culture and biopsy.  A probe could easily be passed directly from the anterior midline internally at the level the dentate line to the anterior peritoneum proper.  Therefore, it was decided that it was most sensible if the drainage could be controlled.  Therefore, a 2-0 silk tie was then passed through this fistula tract.  This was followed by a red vessel loop, which was tied in place with a seton.  Hemostasis was checked for and felt to be adequate.  A pad was placed over the anal canal.  All sponge, blade, and needle counts were reported as correct x 2.         JA MARRERO MD             D: 2019   T: 2019   MT: WILI      Name:     JOAN MCNAMARA   MRN:      [id number]        Account:        [id number]   :      1965           Procedure Date: 2019      Document: [id number]       cc: Amber Ibrahim MD

## 2019-04-01 ENCOUNTER — TRANSFERRED RECORDS (OUTPATIENT)
Dept: HEALTH INFORMATION MANAGEMENT | Facility: CLINIC | Age: 54
End: 2019-04-01

## 2019-04-01 LAB
BACTERIA SPEC CULT: ABNORMAL
LAB SCANNED RESULT: NORMAL
Lab: ABNORMAL
SPECIMEN SOURCE: ABNORMAL

## 2019-04-03 ENCOUNTER — HOSPITAL ENCOUNTER (EMERGENCY)
Facility: CLINIC | Age: 54
Discharge: HOME | End: 2019-04-03
Attending: EMERGENCY MEDICINE | Admitting: EMERGENCY MEDICINE
Payer: COMMERCIAL

## 2019-04-03 VITALS
OXYGEN SATURATION: 100 % | WEIGHT: 186 LBS | RESPIRATION RATE: 20 BRPM | BODY MASS INDEX: 29.89 KG/M2 | HEART RATE: 91 BPM | HEIGHT: 66 IN | DIASTOLIC BLOOD PRESSURE: 84 MMHG | TEMPERATURE: 97.3 F | SYSTOLIC BLOOD PRESSURE: 131 MMHG

## 2019-04-03 DIAGNOSIS — K62.5 BLEEDING FROM ANUS: ICD-10-CM

## 2019-04-03 LAB
ABO + RH BLD: NORMAL
ABO + RH BLD: NORMAL
ANION GAP SERPL CALCULATED.3IONS-SCNC: 8 MMOL/L (ref 3–14)
BASOPHILS # BLD AUTO: 0 10E9/L (ref 0–0.2)
BASOPHILS NFR BLD AUTO: 0.6 %
BLD GP AB SCN SERPL QL: NORMAL
BLOOD BANK CMNT PATIENT-IMP: NORMAL
BUN SERPL-MCNC: 8 MG/DL (ref 7–30)
CALCIUM SERPL-MCNC: 8.6 MG/DL (ref 8.5–10.1)
CHLORIDE SERPL-SCNC: 107 MMOL/L (ref 94–109)
CO2 SERPL-SCNC: 23 MMOL/L (ref 20–32)
CREAT SERPL-MCNC: 0.59 MG/DL (ref 0.52–1.04)
DIFFERENTIAL METHOD BLD: ABNORMAL
EOSINOPHIL # BLD AUTO: 0.1 10E9/L (ref 0–0.7)
EOSINOPHIL NFR BLD AUTO: 1.4 %
ERYTHROCYTE [DISTWIDTH] IN BLOOD BY AUTOMATED COUNT: 14.2 % (ref 10–15)
GFR SERPL CREATININE-BSD FRML MDRD: >90 ML/MIN/{1.73_M2}
GLUCOSE SERPL-MCNC: 104 MG/DL (ref 70–99)
HCT VFR BLD AUTO: 38.4 % (ref 35–47)
HGB BLD-MCNC: 13.2 G/DL (ref 11.7–15.7)
IMM GRANULOCYTES # BLD: 0 10E9/L (ref 0–0.4)
IMM GRANULOCYTES NFR BLD: 0.2 %
LYMPHOCYTES # BLD AUTO: 1 10E9/L (ref 0.8–5.3)
LYMPHOCYTES NFR BLD AUTO: 18.8 %
MCH RBC QN AUTO: 35.7 PG (ref 26.5–33)
MCHC RBC AUTO-ENTMCNC: 34.4 G/DL (ref 31.5–36.5)
MCV RBC AUTO: 104 FL (ref 78–100)
MONOCYTES # BLD AUTO: 0.6 10E9/L (ref 0–1.3)
MONOCYTES NFR BLD AUTO: 11 %
NEUTROPHILS # BLD AUTO: 3.5 10E9/L (ref 1.6–8.3)
NEUTROPHILS NFR BLD AUTO: 68 %
NRBC # BLD AUTO: 0 10*3/UL
NRBC BLD AUTO-RTO: 0 /100
PLATELET # BLD AUTO: 121 10E9/L (ref 150–450)
POTASSIUM SERPL-SCNC: 4 MMOL/L (ref 3.4–5.3)
RBC # BLD AUTO: 3.7 10E12/L (ref 3.8–5.2)
SODIUM SERPL-SCNC: 138 MMOL/L (ref 133–144)
SPECIMEN EXP DATE BLD: NORMAL
WBC # BLD AUTO: 5.1 10E9/L (ref 4–11)

## 2019-04-03 PROCEDURE — 85025 COMPLETE CBC W/AUTO DIFF WBC: CPT | Performed by: EMERGENCY MEDICINE

## 2019-04-03 PROCEDURE — 96374 THER/PROPH/DIAG INJ IV PUSH: CPT

## 2019-04-03 PROCEDURE — 25000128 H RX IP 250 OP 636: Performed by: EMERGENCY MEDICINE

## 2019-04-03 PROCEDURE — 86900 BLOOD TYPING SEROLOGIC ABO: CPT | Performed by: EMERGENCY MEDICINE

## 2019-04-03 PROCEDURE — 80048 BASIC METABOLIC PNL TOTAL CA: CPT | Performed by: EMERGENCY MEDICINE

## 2019-04-03 PROCEDURE — 86850 RBC ANTIBODY SCREEN: CPT | Performed by: EMERGENCY MEDICINE

## 2019-04-03 PROCEDURE — 99285 EMERGENCY DEPT VISIT HI MDM: CPT | Mod: 25

## 2019-04-03 PROCEDURE — 86901 BLOOD TYPING SEROLOGIC RH(D): CPT | Performed by: EMERGENCY MEDICINE

## 2019-04-03 RX ORDER — OXYCODONE AND ACETAMINOPHEN 5; 325 MG/1; MG/1
1-2 TABLET ORAL EVERY 4 HOURS PRN
Qty: 16 TABLET | Refills: 0 | Status: SHIPPED | OUTPATIENT
Start: 2019-04-03 | End: 2019-09-23

## 2019-04-03 RX ORDER — MORPHINE SULFATE 4 MG/ML
4 INJECTION, SOLUTION INTRAMUSCULAR; INTRAVENOUS ONCE
Status: COMPLETED | OUTPATIENT
Start: 2019-04-03 | End: 2019-04-03

## 2019-04-03 RX ADMIN — MORPHINE SULFATE 4 MG: 4 INJECTION INTRAVENOUS at 15:01

## 2019-04-03 ASSESSMENT — ENCOUNTER SYMPTOMS
CHILLS: 0
ABDOMINAL DISTENTION: 1
RECTAL PAIN: 1
ANAL BLEEDING: 1
VOMITING: 0
ABDOMINAL PAIN: 1
FATIGUE: 1
DIARRHEA: 1
NAUSEA: 1
FEVER: 0

## 2019-04-03 ASSESSMENT — MIFFLIN-ST. JEOR: SCORE: 1460.44

## 2019-04-03 NOTE — ED AVS SNAPSHOT
Emergency Department  [address] [place] [id number]  Phone:  [phone]  Fax:  [phone]                                    Abiola Matute   MRN: [id number]    Department:   Emergency Department   Date of Visit:  4/3/2019           After Visit Summary Signature Page    I have received my discharge instructions, and my questions have been answered. I have discussed any challenges I see with this plan with the nurse or doctor.    ..........................................................................................................................................  Patient/Patient Representative Signature      ..........................................................................................................................................  Patient Representative Print Name and Relationship to Patient    ..................................................               ................................................  Date                                   Time    ..........................................................................................................................................  Reviewed by Signature/Title    ...................................................              ..............................................  Date                                               Time          22EPIC Rev 08/18

## 2019-04-03 NOTE — ED PROVIDER NOTES
History     Chief Complaint:  Rectal bleeding    HPI   Abiola Matute is a 53 year old female status post anal biopsy 6 days ago who presents with rectal bleeding and rectal pain. The patient states that 3 days ago she experienced an episode of rectal bleeding which lasted 45 minutes and included blood clots. At that time, she felt nauseated and had abdominal pain and distension. 5 hours ago, she started experiencing rectal bleeding which has not stopped and she also reports intermittent sharp rectal pains. She feels fatigued but denies any fevers, chills, current abdominal pain, vomiting, or vaginal bleeding. The patient is not on blood thinners. She reports that she has experienced intermittent diarrhea for the last few years and has experienced rectal bleeding for the last 6 months. In the last year she has had a colonoscopy and 2 flexible sigmoidoscopies which have been unremarkable. She believes that a fistula was found during her anal surgery last week and was banded. Dr. Kellogg is her gastroenterologist and Dr. Fleming is her colorectal surgeon.       Allergies:  Fish Oil  Metronidazole  Naphthalenemethylamines  Penicillins  Ppd [Tuberculin Purified Protein Derivative]  Sulfa Drugs     Medications:    Tylenol   Norco  Prinivil  Ativan    Past Medical History:    Atypical ductal hyperplasia of breast  Bifid uvula  HTN  IBS  Obesity  Alcoholic fatty liver  Nodular cirrhosis of liver    Past Surgical History:    Anal biopsy  Breast biopsy  appendectomy   Colonoscopy x2  Left ankle ORIF  Hysterectomy  flexible sigmoidoscopy    Family History:    Breast cancer  GI disease  Heart disease  HTN  Diabetes  Cancer    Social History:  Patient presents with   Negative for tobacco use.  Positive for alcohol use.   Marital Status:        Review of Systems   Constitutional: Positive for fatigue. Negative for chills and fever.   Gastrointestinal: Positive for abdominal distention, abdominal pain, anal  "bleeding, diarrhea, nausea and rectal pain. Negative for vomiting.   Genitourinary: Negative for vaginal bleeding.   All other systems reviewed and are negative.      Physical Exam   First Vitals:  BP: 120/79  Pulse: 100  Heart Rate: 90  Temp: 97.3  F (36.3  C)  Resp: 18  Height: 167.6 cm (5' 6\")  Weight: 84.4 kg (186 lb)  SpO2: 99 %      Physical Exam  SKIN:  Warm, dry.  HEMATOLOGIC/IMMUNOLOGIC/LYMPHATIC:  No pallor.  EYES:  Conjunctivae normal.  CARDIOVASCULAR:  Regular rate and rhythm.  RESPIRATORY:  No respiratory distress, breath sounds equal and normal.  GASTROINTESTINAL:  Soft, nontender abdomen with active bowel sounds.  No mass or distension.  GENITOURINARY:  Clotted blood about the anus, no area of active bleeding, no perianal erythema or purulent discharge or abscess.  Several miguel angel-anal fissures.  Patient declined digital rectal exam.  MUSCULOSKELETAL:  Normal body habitus.  NEUROLOGIC:  Alert, conversant.  PSYCHIATRIC:  Normal mood.    Emergency Department Course   Laboratory:  CBC: WBC: 5.1, HGB: 13.2, PLT: 121 (L)  ABO/Rh: ABO: A, RH: Pos, antibody screen: neg  BMP: Glucose 104 (H), o/w WNL (Creatinine: 0.59)    Emergency Department Course:  Nursing notes and vitals reviewed.  1420: I performed an exam of the patient as documented above.     1445: I discussed the case with Dr. Alves of colorectal surgery regarding the patient.    1453: I rechecked the patient. Findings and plan explained to the Patient. Patient discharged home with instructions regarding supportive care, medications, and reasons to return. The importance of close follow-up was reviewed.     1501: I discussed the case with Dr. Fleming of colorectal surgery regarding the patient.    1510: I rechecked the patient. Explained findings to patient.    Impression & Plan    Medical Decision Making:  Abiola Matute is a 53 year old female who presents concerned about episodic post procedure perianal bleeding. On examination, there is no active " bleeding, but clearly she does have fissure and fistula disease about the anus. No evidence of perianal abscess or inflammation with respect to infection. It appears that the red rubber fistular tract tube is absent so it possibly fell out. Lab work was very reassuring , specifically hemoglobin. Vital signs normal. I spoke with Dr. Fleming who was in clinic today.  She advised follow up with her in 2 days in [place] or Monday in [place] in clinic. Patient chose the former. Otherwise, she advised to have the patient wrap a bag of ice in a towel and sit on that if repetitive bleeding.       Diagnosis:    ICD-10-CM    1. Bleeding from anus K62.5        Disposition:  discharged to home    Discharge Medications:     Medication List      Started    oxyCODONE-acetaminophen 5-325 MG tablet  Commonly known as:  PERCOCET  1-2 tablets, Oral, EVERY 4 HOURS PRN          Mg DUDLEY, am serving as a scribe on 4/3/2019 at 2:53 PM to personally document services performed by Kirit Gamble MD based on my observations and the provider's statements to me.       Mg Kim  4/3/2019    EMERGENCY DEPARTMENT       Kirit Gamble MD  04/03/19 1424

## 2019-04-04 LAB
BACTERIA SPEC CULT: NORMAL
BACTERIA SPEC CULT: NORMAL
Lab: NORMAL
SPECIMEN SOURCE: NORMAL

## 2019-04-09 LAB — COPATH REPORT: NORMAL

## 2019-04-11 NOTE — RESULT ENCOUNTER NOTE
Geovanna Coleman ,    The results from your recent lab work are within normal limits, but the liver numbers are still mildly elevated.  Please have this rechecked in about one month with a lab only appointment.        Thank you for choosing [facility] for your health care needs,      Amber Castro PA-C

## 2019-04-16 ENCOUNTER — TRANSFERRED RECORDS (OUTPATIENT)
Dept: HEALTH INFORMATION MANAGEMENT | Facility: CLINIC | Age: 54
End: 2019-04-16

## 2019-04-23 ENCOUNTER — TELEPHONE (OUTPATIENT)
Dept: FAMILY MEDICINE | Facility: CLINIC | Age: 54
End: 2019-04-23

## 2019-04-23 NOTE — TELEPHONE ENCOUNTER
[facility] Radiology calling to request history and physical. Please fax or call with verbal  Fax [phone]. Attn: Matthew   [phone]    Thank you  Charleen Diaz

## 2019-04-29 ENCOUNTER — TRANSFERRED RECORDS (OUTPATIENT)
Dept: HEALTH INFORMATION MANAGEMENT | Facility: CLINIC | Age: 54
End: 2019-04-29

## 2019-05-02 ENCOUNTER — TRANSFERRED RECORDS (OUTPATIENT)
Dept: HEALTH INFORMATION MANAGEMENT | Facility: CLINIC | Age: 54
End: 2019-05-02

## 2019-05-10 ENCOUNTER — MEDICAL CORRESPONDENCE (OUTPATIENT)
Dept: HEALTH INFORMATION MANAGEMENT | Facility: CLINIC | Age: 54
End: 2019-05-10

## 2019-05-23 ENCOUNTER — MEDICAL CORRESPONDENCE (OUTPATIENT)
Dept: HEALTH INFORMATION MANAGEMENT | Facility: CLINIC | Age: 54
End: 2019-05-23

## 2019-06-21 ENCOUNTER — MEDICAL CORRESPONDENCE (OUTPATIENT)
Dept: HEALTH INFORMATION MANAGEMENT | Facility: CLINIC | Age: 54
End: 2019-06-21

## 2019-07-19 ENCOUNTER — TELEPHONE (OUTPATIENT)
Dept: FAMILY MEDICINE | Facility: CLINIC | Age: 54
End: 2019-07-19

## 2019-07-19 NOTE — TELEPHONE ENCOUNTER
Reason for Call:  Patient has been dealing with [facility] [employer] [facility] regarding work restrictions.  They have now asked her to talk to [employer] for clearance.  Patient needs unemployment work restrictions letter to go back to work without restrictions.   Informed patient she needs an appointment.  States she wants to talk to Dr Goncalves's team further.    Best phone number to reach pt at is: [phone]  Ok to leave a message with medical info? yes    Kerri Donato

## 2019-07-19 NOTE — TELEPHONE ENCOUNTER
Contacted patient who reports that she lost her job in January and collected severance.  Unable to collect unemployment until June.      chron's disease    Filling out unemployment paperwork.  States that she feels she has some restrictions and so     [facility] do not feel that they should fill out paper work and that she should follow up with PCP to have paper work completed.      Patient wanted appointment sooner than first available and will see provider in [place] to go over new diagnosis and relate to work related restrictions.  Steph TAVERA RN   [facility] [place]

## 2019-07-19 NOTE — TELEPHONE ENCOUNTER
Patient last saw PCP 6/2016.     Non-detailed message left to return our call for further information on request.  Likely will need letter if regarding ED visits as she has not been seen in clinic since then.  Steph TAVERA RN   [facility] [place]

## 2019-07-23 ENCOUNTER — OFFICE VISIT (OUTPATIENT)
Dept: FAMILY MEDICINE | Facility: CLINIC | Age: 54
End: 2019-07-23
Payer: COMMERCIAL

## 2019-07-23 VITALS
SYSTOLIC BLOOD PRESSURE: 122 MMHG | OXYGEN SATURATION: 99 % | BODY MASS INDEX: 29.41 KG/M2 | HEART RATE: 99 BPM | TEMPERATURE: 98.4 F | DIASTOLIC BLOOD PRESSURE: 70 MMHG | WEIGHT: 183 LBS | HEIGHT: 66 IN

## 2019-07-23 DIAGNOSIS — Z02.89 ENCOUNTER FOR COMPLETION OF FORM WITH PATIENT: ICD-10-CM

## 2019-07-23 DIAGNOSIS — Z12.39 SCREENING FOR BREAST CANCER: ICD-10-CM

## 2019-07-23 DIAGNOSIS — K50.119 CROHN'S DISEASE OF PERIANAL REGION WITH COMPLICATION (H): Primary | ICD-10-CM

## 2019-07-23 PROCEDURE — 99213 OFFICE O/P EST LOW 20 MIN: CPT | Performed by: NURSE PRACTITIONER

## 2019-07-23 ASSESSMENT — MIFFLIN-ST. JEOR: SCORE: 1441.83

## 2019-07-23 NOTE — PROGRESS NOTES
Subjective     Abiola Matute is a 54 year old female who presents to clinic today for the following health issues:    HPI     Patient who reports that she lost her job in January and collected severance,.  Unable to collect unemployment until June.  Qualified for unemployment early June.    Crohn's disease- had a flare up of her Crohn's disease the first week of June, she tried to collect unemployment- so no longer needs any restrictions.      Filling out unemployment paperwork - for [place] [employer].      Patient is followed by [facility] and was diagnosed with Severe Perianal Crohn's Disease and Alcoholic cirrhosis (compensated).    Patient initiated Remicade in May 2019, currently getting every 8 week infusions.  Needs annual follow-up with liver clinic at [facility].      Continued loose stools, anal pain.  Difficulty with sitting, standing, lying.  Slight improvement with initiation of Remicade.      Next follow-up with FEDERICO TRAVIS planned in September 2019.      Patient Active Problem List   Diagnosis     Non morbid obesity due to excess calories     Other isolated or specific phobias     Other congenital malformations of mouth     Contact dermatitis and other eczema, due to unspecified cause     Intestinal infection due to Clostridium difficile     Iron deficiency anemia, unspecified iron deficiency anemia type     Rosacea     Atypical ductal hyperplasia of left breast     Idiopathic thrombocytopenia (H)     Postmenopausal- s/p hysterectomy with BSO - not on HRT secondary to atypical ductal hyperplasia & breast pain -no paps needed     Claustrophobia     S/P total hysterectomy and bilateral salpingo-oophorectomy     Abnormal liver enzymes- ? related to ongoing etoh use/abuse     Essential hypertension with goal blood pressure less than 140/90     Gastroenteritis     Stool incontinence     CARDIOVASCULAR SCREENING; LDL GOAL LESS THAN 130     AA (alcohol abuse) - ? ongoing      Alcoholic fatty liver      Acquired hyperbilirubinemia- ? secondary to alcohol use     Diffuse nodular cirrhosis of liver (H)     Severe Perianal Crohn's Disease     Past Surgical History:   Procedure Laterality Date     BIOPSY ANAL N/A 3/28/2019    anal biopsy and culure placement of seton - Dr Fleming     BREAST BIOPSY, RT/LT  9/17/2010    left - scheduled with Dr. Kojo WILKERSON APPENDECTOMY  at age 14     COLONOSCOPY  2006     COLONOSCOPY N/A 12/23/2014    Procedure: COMBINED COLONOSCOPY, SINGLE OR MULTIPLE BIOPSY/POLYPECTOMY BY BIOPSY;  Surgeon: Diane Fleming MD;  Location:  GI     EXAM UNDER ANESTHESIA ANUS N/A 3/28/2019    Procedure: EXAM UNDER ANESTHESIA ANUS;  Surgeon: Diane Fleming MD;  Location:  OR     HC CLOSED TX BIMALLEOLAR ANKLE FX W/O MANIPULATION  at age 27    left ankle ORIF, plates and screws removed at age 36     HYSTERECTOMY, VAGINAL  2006    with Dr. Licha Zhou - with BSO for fibroids      SURGICAL HISTORY OF -   4/15/2010    Pelviscopy with removal of bilateral hydrosalpinges.        Social History     Tobacco Use     Smoking status: Never Smoker     Smokeless tobacco: Never Used   Substance Use Topics     Alcohol use: Yes     Alcohol/week: 0.0 oz     Comment: occasional     Family History   Problem Relation Age of Onset     Breast Cancer Mother      Gastrointestinal Disease Mother      Heart Disease Father 57     Heart Disease Paternal Grandfather      Hypertension Maternal Grandmother      Diabetes Paternal Grandmother      Diabetes Maternal Grandfather      Cancer Sister          Current Outpatient Medications   Medication Sig Dispense Refill     acetaminophen (TYLENOL) 325 MG tablet Take 325-650 mg by mouth every 6 hours as needed for mild pain       Calcium Carbonate (CALCIUM 500 PO) Take 1 tablet by mouth daily        cholecalciferol (VITAMIN D) 1000 UNIT tablet Take 1 tablet by mouth daily.       HYDROcodone-acetaminophen (NORCO) 5-325 MG tablet Take 1-2 tablets by mouth every 4 hours as  "needed for moderate to severe pain 15 tablet 0     lisinopril (PRINIVIL/ZESTRIL) 10 MG tablet TAKE ONE TABLET BY MOUTH DAILY 90 tablet 3     LORazepam (ATIVAN) 0.5 MG tablet Take 1 tablet by mouth. 1-2 tabs 30-60 minutes prior to exam/procedure/flight 10 tablet 0     Multiple Vitamins-Minerals (MULTIVITAMIN & MINERAL PO) Take  by mouth daily.       oxyCODONE-acetaminophen (PERCOCET) 5-325 MG tablet Take 1-2 tablets by mouth every 4 hours as needed for pain 16 tablet 0     Allergies   Allergen Reactions     Fish Oil      Redness and itching around eye area only - went away when fish oil capsules stopped      Metronidazole      pain/itching     Naphthalenemethylamines      Lamisil = mild urticarial reaction     Penicillins Cramps     Vomiting     Ppd [Tuberculin Purified Protein Derivative]      Sulfa Drugs      hives       Reviewed and updated as needed this visit by Provider         Review of Systems   ROS COMP: Constitutional, HEENT, cardiovascular, pulmonary, gi and gu systems are negative, except as otherwise noted.      Objective    /70 (BP Location: Right arm, Patient Position: Sitting, Cuff Size: Adult Regular)   Pulse 99   Temp 98.4  F (36.9  C) (Oral)   Ht 1.676 m (5' 6\")   Wt 83 kg (183 lb)   LMP 05/31/2006   SpO2 99%   BMI 29.54 kg/m    Body mass index is 29.54 kg/m .  Physical Exam   GENERAL: healthy, alert and no distress  PSYCH: mentation appears normal, affect normal/bright        Assessment & Plan     Abiola was seen today for form request.    Diagnoses and all orders for this visit:      Severe Perianal Crohn's Disease  Followed by [facility].  Initiated Remicade infusions in May 2019, is currently receiving infusions every 8 weeks.    Follow-up with FEDER[facility] TRAVIS scheduled for September 2019.      Encounter for completion of form with patient  [place] form completed.      Screening for breast cancer  -     *MA Screening Digital Bilateral; Future    Encouraged follow-up for preventative " visit.       Return in about 1 month (around 8/23/2019) for Physical Exam.    DARYN Lucas [facility] [place]

## 2019-08-16 ENCOUNTER — MEDICAL CORRESPONDENCE (OUTPATIENT)
Dept: HEALTH INFORMATION MANAGEMENT | Facility: CLINIC | Age: 54
End: 2019-08-16

## 2019-09-23 ENCOUNTER — OFFICE VISIT (OUTPATIENT)
Dept: FAMILY MEDICINE | Facility: CLINIC | Age: 54
End: 2019-09-23
Payer: COMMERCIAL

## 2019-09-23 VITALS
WEIGHT: 189 LBS | SYSTOLIC BLOOD PRESSURE: 108 MMHG | HEIGHT: 66 IN | TEMPERATURE: 98 F | OXYGEN SATURATION: 98 % | BODY MASS INDEX: 30.37 KG/M2 | DIASTOLIC BLOOD PRESSURE: 70 MMHG | HEART RATE: 116 BPM

## 2019-09-23 DIAGNOSIS — Z00.00 ROUTINE GENERAL MEDICAL EXAMINATION AT A HEALTH CARE FACILITY: Primary | ICD-10-CM

## 2019-09-23 DIAGNOSIS — Z12.39 SCREENING FOR BREAST CANCER: ICD-10-CM

## 2019-09-23 DIAGNOSIS — I10 ESSENTIAL HYPERTENSION WITH GOAL BLOOD PRESSURE LESS THAN 140/90: ICD-10-CM

## 2019-09-23 DIAGNOSIS — Z13.220 SCREENING FOR LIPID DISORDERS: ICD-10-CM

## 2019-09-23 PROCEDURE — 99396 PREV VISIT EST AGE 40-64: CPT | Performed by: NURSE PRACTITIONER

## 2019-09-23 PROCEDURE — 80053 COMPREHEN METABOLIC PANEL: CPT | Performed by: NURSE PRACTITIONER

## 2019-09-23 PROCEDURE — 36415 COLL VENOUS BLD VENIPUNCTURE: CPT | Performed by: NURSE PRACTITIONER

## 2019-09-23 PROCEDURE — 99213 OFFICE O/P EST LOW 20 MIN: CPT | Mod: 25 | Performed by: NURSE PRACTITIONER

## 2019-09-23 PROCEDURE — 80061 LIPID PANEL: CPT | Performed by: NURSE PRACTITIONER

## 2019-09-23 PROCEDURE — 82043 UR ALBUMIN QUANTITATIVE: CPT | Performed by: NURSE PRACTITIONER

## 2019-09-23 RX ORDER — LISINOPRIL 10 MG/1
10 TABLET ORAL DAILY
Qty: 90 TABLET | Refills: 3 | Status: SHIPPED | OUTPATIENT
Start: 2019-09-23 | End: 2019-11-26

## 2019-09-23 ASSESSMENT — MIFFLIN-ST. JEOR: SCORE: 1469.05

## 2019-09-23 NOTE — PROGRESS NOTES
SUBJECTIVE:   CC: Abiola Matute is an 54 year old woman who presents for preventive health visit.       Biometric Form     Healthy Habits:    Do you get at least three servings of calcium containing foods daily (dairy, green leafy vegetables, etc.)? Yes- sometimes hard with Crohn diet its hard     Amount of exercise or daily activities, outside of work: none day(s) per week    Problems taking medications regularly No    Medication side effects: No    Have you had an eye exam in the past two years? yes    Do you see a dentist twice per year? yes    Do you have sleep apnea, excessive snoring or daytime drowsiness?no    Hypertension:  Stable, well controlled on Lisinopril, 10 mg daily.      Today's PHQ-2 Score:   PHQ-2 ( 1999 [employer]) 7/23/2019 8/3/2016   Q1: Little interest or pleasure in doing things 0 0   Q2: Feeling down, depressed or hopeless 0 0   PHQ-2 Score 0 0   Q1: Little interest or pleasure in doing things - -   Q2: Feeling down, depressed or hopeless - -   PHQ-2 Score - -       Abuse: Current or Past(Physical, Sexual or Emotional)- No  Do you feel safe in your environment? Yes    Social History     Tobacco Use     Smoking status: Never Smoker     Smokeless tobacco: Never Used   Substance Use Topics     Alcohol use: Yes     Alcohol/week: 0.0 standard drinks     Comment: occasional     If you drink alcohol do you typically have >3 drinks per day or >7 drinks per week? No                     Reviewed orders with patient.  Reviewed health maintenance and updated orders accordingly - Yes  BP Readings from Last 3 Encounters:   09/23/19 108/70   07/23/19 122/70   04/03/19 131/84    Wt Readings from Last 3 Encounters:   09/23/19 85.7 kg (189 lb)   07/23/19 83 kg (183 lb)   04/03/19 84.4 kg (186 lb)                  Patient Active Problem List   Diagnosis     Non morbid obesity due to excess calories     Other isolated or specific phobias     Other congenital malformations of mouth     Contact dermatitis and  other eczema, due to unspecified cause     Intestinal infection due to Clostridium difficile     Iron deficiency anemia, unspecified iron deficiency anemia type     Rosacea     Atypical ductal hyperplasia of left breast     Idiopathic thrombocytopenia (H)     Postmenopausal- s/p hysterectomy with BSO - not on HRT secondary to atypical ductal hyperplasia & breast pain -no paps needed     Claustrophobia     S/P total hysterectomy and bilateral salpingo-oophorectomy     Abnormal liver enzymes- ? related to ongoing etoh use/abuse     Essential hypertension with goal blood pressure less than 140/90     Gastroenteritis     Stool incontinence     CARDIOVASCULAR SCREENING; LDL GOAL LESS THAN 130     AA (alcohol abuse) - ? ongoing      Alcoholic fatty liver     Acquired hyperbilirubinemia- ? secondary to alcohol use     Diffuse nodular cirrhosis of liver (H)     Severe Perianal Crohn's Disease     Past Surgical History:   Procedure Laterality Date     BIOPSY ANAL N/A 3/28/2019    anal biopsy and culure placement of seton - Dr Fleming     BREAST BIOPSY, RT/LT  9/17/2010    left - scheduled with Dr. Kojo WILKERSON APPENDECTOMY  at age 14     COLONOSCOPY  2006     COLONOSCOPY N/A 12/23/2014    Procedure: COMBINED COLONOSCOPY, SINGLE OR MULTIPLE BIOPSY/POLYPECTOMY BY BIOPSY;  Surgeon: Diane Fleming MD;  Location:  GI     EXAM UNDER ANESTHESIA ANUS N/A 3/28/2019    Procedure: EXAM UNDER ANESTHESIA ANUS;  Surgeon: Diane Fleming MD;  Location:  OR      CLOSED TX BIMALLEOLAR ANKLE FX W/O MANIPULATION  at age 27    left ankle ORIF, plates and screws removed at age 36     HYSTERECTOMY, VAGINAL  2006    with Dr. Licha Zhou - with BSO for fibroids      SURGICAL HISTORY OF -   4/15/2010    Pelviscopy with removal of bilateral hydrosalpinges.        Social History     Tobacco Use     Smoking status: Never Smoker     Smokeless tobacco: Never Used   Substance Use Topics     Alcohol use: Yes     Alcohol/week: 0.0  standard drinks     Comment: occasional     Family History   Problem Relation Age of Onset     Breast Cancer Mother      Gastrointestinal Disease Mother      Heart Disease Father 57     Heart Disease Paternal Grandfather      Hypertension Maternal Grandmother      Diabetes Paternal Grandmother      Diabetes Maternal Grandfather      Cancer Sister          Current Outpatient Medications   Medication Sig Dispense Refill     acetaminophen (TYLENOL) 325 MG tablet Take 325-650 mg by mouth every 6 hours as needed for mild pain       Calcium Carbonate (CALCIUM 500 PO) Take 1 tablet by mouth daily        cholecalciferol (VITAMIN D) 1000 UNIT tablet Take 1 tablet by mouth daily.       lisinopril (PRINIVIL/ZESTRIL) 10 MG tablet Take 1 tablet (10 mg) by mouth daily 90 tablet 3     LORazepam (ATIVAN) 0.5 MG tablet Take 1 tablet by mouth. 1-2 tabs 30-60 minutes prior to exam/procedure/flight 10 tablet 0     Multiple Vitamins-Minerals (MULTIVITAMIN & MINERAL PO) Take  by mouth daily.         Mammogram Screening: Patient over age 50, mutual decision to screen reflected in health maintenance.    Pertinent mammograms are reviewed under the imaging tab.  History of abnormal Pap smear: Status post benign hysterectomy. Health Maintenance and Surgical History updated.  PAP / HPV 9/19/2011 8/24/2010 12/6/2005   PAP NIL ASC-US(A) ASC-US(A)     Reviewed and updated as needed this visit by clinical staff  Tobacco  Allergies         Reviewed and updated as needed this visit by Provider        Past Medical History:   Diagnosis Date     Atypical ductal hyperplasia of breast 9/10/10    ERT not recommended -left - and flat epithelial atypia-scheduled for breast biopsy 9/17/2010      BIFID UVULA (aka UVULA)       Contact dermatitis and other eczema, due to unspecified cause     perianal  - recurrent - clobetasol      Hypertension      IBS (irritable bowel syndrome)      Obesity, unspecified      Other isolated or specific phobias     fear of  "flying - gets Ativan prn.       Past Surgical History:   Procedure Laterality Date     BIOPSY ANAL N/A 3/28/2019    anal biopsy and culure placement of seton - Dr Fleming     BREAST BIOPSY, RT/LT  9/17/2010    left - scheduled with Dr. Kojo WILKERSON APPENDECTOMY  at age 14     COLONOSCOPY  2006     COLONOSCOPY N/A 12/23/2014    Procedure: COMBINED COLONOSCOPY, SINGLE OR MULTIPLE BIOPSY/POLYPECTOMY BY BIOPSY;  Surgeon: Diane Fleming MD;  Location:  GI     EXAM UNDER ANESTHESIA ANUS N/A 3/28/2019    Procedure: EXAM UNDER ANESTHESIA ANUS;  Surgeon: Diane Fleming MD;  Location:  OR     HC CLOSED TX BIMALLEOLAR ANKLE FX W/O MANIPULATION  at age 27    left ankle ORIF, plates and screws removed at age 36     HYSTERECTOMY, VAGINAL  2006    with Dr. Licha Zhou - with BSO for fibroids      SURGICAL HISTORY OF -   4/15/2010    Pelviscopy with removal of bilateral hydrosalpinges.        ROS:  CONSTITUTIONAL: NEGATIVE for fever, chills, change in weight  INTEGUMENTARY/SKIN: NEGATIVE for worrisome rashes, moles or lesions  EYES: NEGATIVE for vision changes or irritation  ENT: NEGATIVE for ear, mouth and throat problems  RESP: NEGATIVE for significant cough or SOB  BREAST: NEGATIVE for masses, tenderness or discharge  CV: NEGATIVE for chest pain, palpitations or peripheral edema  GI: POSITIVE for abdominal pain/bloating related to perianal crohn's disease  : NEGATIVE for unusual urinary or vaginal symptoms. No vaginal bleeding.  MUSCULOSKELETAL: NEGATIVE for significant arthralgias or myalgia  NEURO: NEGATIVE for weakness, dizziness or paresthesias  PSYCHIATRIC: NEGATIVE for changes in mood or affect     OBJECTIVE:   /70 (BP Location: Right arm, Patient Position: Sitting, Cuff Size: Adult Regular)   Pulse 116   Temp 98  F (36.7  C) (Oral)   Ht 1.676 m (5' 6\")   Wt 85.7 kg (189 lb)   LMP 05/31/2006   SpO2 98%   BMI 30.51 kg/m    EXAM:  GENERAL: healthy, alert and no distress  EYES: Eyes grossly " "normal to inspection, PERRL and conjunctivae and sclerae normal  HENT: ear canals and TM's normal, nose and mouth without ulcers or lesions  NECK: no adenopathy, no asymmetry, masses, or scars and thyroid normal to palpation  RESP: lungs clear to auscultation - no rales, rhonchi or wheezes  CV: regular rate and rhythm, normal S1 S2, no S3 or S4, no murmur, click or rub, no peripheral edema   ABDOMEN: soft, nontender, no hepatosplenomegaly, no masses and bowel sounds normal  MS: no gross musculoskeletal defects noted, no edema  SKIN: no suspicious lesions or rashes  NEURO: Normal strength and tone, mentation intact and speech normal  PSYCH: mentation appears normal, affect normal/bright        ASSESSMENT/PLAN:     Abiola was seen today for physical.    Diagnoses and all orders for this visit:          Routine general medical examination at a health care facility    Essential hypertension with goal blood pressure less than 140/90        Stable, well controlled on Lisinopril, 10 mg daily.    -     Albumin Random Urine Quantitative with Creat Ratio  -     Comprehensive metabolic panel (BMP + Alb, Alk Phos, ALT, AST, Total. Bili, TP)  -     lisinopril (PRINIVIL/ZESTRIL) 10 MG tablet; Take 1 tablet (10 mg) by mouth daily          Screening for breast cancer  -     MA SCREENING DIGITAL BILAT - Future  (s+30); Future         Screening for lipid disorders  -     Lipid panel reflex to direct LDL Fasting      COUNSELING:   Reviewed preventive health counseling, as reflected in patient instructions       Regular exercise       Healthy diet/nutrition    Estimated body mass index is 30.51 kg/m  as calculated from the following:    Height as of this encounter: 1.676 m (5' 6\").    Weight as of this encounter: 85.7 kg (189 lb).    Weight management plan: Discussed healthy diet and exercise guidelines     reports that she has never smoked. She has never used smokeless tobacco.      Counseling Resources:  ATP IV Guidelines  Pooled " Cohorts Equation Calculator  Breast Cancer Risk Calculator  FRAX Risk Assessment  ICSI Preventive Guidelines  Dietary Guidelines for Americans, 2010  USDA's MyPlate  ASA Prophylaxis  Lung CA Screening      Linda Macdonald, DARYN HUMPHREY  [facility]

## 2019-09-23 NOTE — PATIENT INSTRUCTIONS

## 2019-09-24 ENCOUNTER — TELEPHONE (OUTPATIENT)
Dept: FAMILY MEDICINE | Facility: CLINIC | Age: 54
End: 2019-09-24

## 2019-09-24 LAB
ALBUMIN SERPL-MCNC: 2.3 G/DL (ref 3.4–5)
ALP SERPL-CCNC: 140 U/L (ref 40–150)
ALT SERPL W P-5'-P-CCNC: 54 U/L (ref 0–50)
ANION GAP SERPL CALCULATED.3IONS-SCNC: 7 MMOL/L (ref 3–14)
AST SERPL W P-5'-P-CCNC: 119 U/L (ref 0–45)
BILIRUB SERPL-MCNC: 3.4 MG/DL (ref 0.2–1.3)
BUN SERPL-MCNC: 5 MG/DL (ref 7–30)
CALCIUM SERPL-MCNC: 8.1 MG/DL (ref 8.5–10.1)
CHLORIDE SERPL-SCNC: 106 MMOL/L (ref 94–109)
CHOLEST SERPL-MCNC: 95 MG/DL
CO2 SERPL-SCNC: 23 MMOL/L (ref 20–32)
CREAT SERPL-MCNC: 0.56 MG/DL (ref 0.52–1.04)
CREAT UR-MCNC: 260 MG/DL
GFR SERPL CREATININE-BSD FRML MDRD: >90 ML/MIN/{1.73_M2}
GLUCOSE SERPL-MCNC: 85 MG/DL (ref 70–99)
HDLC SERPL-MCNC: 14 MG/DL
LDLC SERPL CALC-MCNC: 64 MG/DL
MICROALBUMIN UR-MCNC: 11 MG/L
MICROALBUMIN/CREAT UR: 4.19 MG/G CR (ref 0–25)
NONHDLC SERPL-MCNC: 81 MG/DL
POTASSIUM SERPL-SCNC: 4.6 MMOL/L (ref 3.4–5.3)
PROT SERPL-MCNC: 8.8 G/DL (ref 6.8–8.8)
SODIUM SERPL-SCNC: 136 MMOL/L (ref 133–144)
TRIGL SERPL-MCNC: 85 MG/DL

## 2019-09-24 NOTE — TELEPHONE ENCOUNTER
Rcvd biometric form.  Waiting for labs to be finalized before this is processed.  Ghazal Matta

## 2019-09-24 NOTE — RESULT ENCOUNTER NOTE
Dear [place],     -HDL(good) cholesterol level is low which can increase your heart disease risk.  A diet high in fat and simple carbohydrates, genetics and being overweight can contribute to this. Your LDL(bad) cholesterol and trigylceride levels are normal.  ADVISE: exercising 150 minutes of aerobic exercise per week (30 minutes 5 days per week or 50 minutes 3 days per week are options), and omega-3 fatty acids (fish oil) 8831-6816 mg daily are helpful to improve this.  In 12 months, you should recheck your fasting cholesterol panel by scheduling a lab-only appointment.  -Liver and gallbladder tests (ALT,AST, Alk phos,bilirubin) are stable.   -Kidney function (GFR) is normal.  -Sodium is normal.  -Potassium is normal.  -Calcium is decreased.  ADVISE: rechecking this in 1-2 months.  -Glucose (diabetic screening test) is normal.  -Microalbumin (urine protein) test is normal.  ADVISE: rechecking this annually.      Please send a [employer] message or call [phone]  if you have any questions.      Linda Macdonald, DARYN, CNP  [facility]    If you have further questions about the interpretation of your labs, [email or web address] is a good website to check out for further information.

## 2019-10-01 ENCOUNTER — HEALTH MAINTENANCE LETTER (OUTPATIENT)
Age: 54
End: 2019-10-01

## 2019-10-02 ENCOUNTER — MEDICAL CORRESPONDENCE (OUTPATIENT)
Dept: HEALTH INFORMATION MANAGEMENT | Facility: CLINIC | Age: 54
End: 2019-10-02

## 2019-10-02 ENCOUNTER — TRANSFERRED RECORDS (OUTPATIENT)
Dept: HEALTH INFORMATION MANAGEMENT | Facility: CLINIC | Age: 54
End: 2019-10-02

## 2019-10-03 ENCOUNTER — TRANSFERRED RECORDS (OUTPATIENT)
Dept: HEALTH INFORMATION MANAGEMENT | Facility: CLINIC | Age: 54
End: 2019-10-03

## 2019-10-09 ENCOUNTER — HOSPITAL ENCOUNTER (OUTPATIENT)
Dept: ULTRASOUND IMAGING | Facility: CLINIC | Age: 54
End: 2019-10-09
Attending: INTERNAL MEDICINE
Payer: COMMERCIAL

## 2019-10-09 ENCOUNTER — HOSPITAL ENCOUNTER (OUTPATIENT)
Facility: CLINIC | Age: 54
Discharge: HOME | End: 2019-10-09
Admitting: PHYSICIAN ASSISTANT
Payer: COMMERCIAL

## 2019-10-09 VITALS — SYSTOLIC BLOOD PRESSURE: 124 MMHG | HEART RATE: 78 BPM | DIASTOLIC BLOOD PRESSURE: 72 MMHG | RESPIRATION RATE: 18 BRPM

## 2019-10-09 DIAGNOSIS — K50.913 PERIANAL FISTULA DUE TO CROHN'S DISEASE (H): ICD-10-CM

## 2019-10-09 DIAGNOSIS — R18.8 ASCITES: ICD-10-CM

## 2019-10-09 DIAGNOSIS — K74.60 HEPATIC CIRRHOSIS, UNSPECIFIED HEPATIC CIRRHOSIS TYPE, UNSPECIFIED WHETHER ASCITES PRESENT (H): ICD-10-CM

## 2019-10-09 DIAGNOSIS — K60.30 PERIANAL FISTULA DUE TO CROHN'S DISEASE (H): ICD-10-CM

## 2019-10-09 DIAGNOSIS — M13.80 MIGRATORY POLYARTHRITIS: ICD-10-CM

## 2019-10-09 LAB
ALBUMIN FLD-MCNC: 0.7 G/DL
APPEARANCE FLD: CLEAR
COLOR FLD: YELLOW
INR PPP: 1.71 (ref 0.86–1.14)
LYMPHOCYTES NFR FLD MANUAL: 40 %
MONOS+MACROS NFR FLD MANUAL: 35 %
NEUTS BAND NFR FLD MANUAL: 1 %
OTHER CELLS FLD MANUAL: 24 %
PLATELET # BLD AUTO: 90 10E9/L (ref 150–450)
PROT FLD-MCNC: 2.3 G/DL
SPECIMEN SOURCE FLD: NORMAL
WBC # FLD AUTO: 385 /UL

## 2019-10-09 PROCEDURE — 88305 TISSUE EXAM BY PATHOLOGIST: CPT

## 2019-10-09 PROCEDURE — 88112 CYTOPATH CELL ENHANCE TECH: CPT | Mod: 26

## 2019-10-09 PROCEDURE — 85049 AUTOMATED PLATELET COUNT: CPT | Performed by: RADIOLOGY

## 2019-10-09 PROCEDURE — 87070 CULTURE OTHR SPECIMN AEROBIC: CPT

## 2019-10-09 PROCEDURE — 36415 COLL VENOUS BLD VENIPUNCTURE: CPT | Performed by: RADIOLOGY

## 2019-10-09 PROCEDURE — 88305 TISSUE EXAM BY PATHOLOGIST: CPT | Mod: 26

## 2019-10-09 PROCEDURE — 25000125 ZZHC RX 250: Performed by: PHYSICIAN ASSISTANT

## 2019-10-09 PROCEDURE — 27210190 US PARACENTESIS

## 2019-10-09 PROCEDURE — 82042 OTHER SOURCE ALBUMIN QUAN EA: CPT

## 2019-10-09 PROCEDURE — 00000155 ZZHCL STATISTIC H-CELL BLOCK W/STAIN

## 2019-10-09 PROCEDURE — 84157 ASSAY OF PROTEIN OTHER: CPT

## 2019-10-09 PROCEDURE — 40000863 ZZH STATISTIC RADIOLOGY XRAY, US, CT, MAR, NM

## 2019-10-09 PROCEDURE — 89051 BODY FLUID CELL COUNT: CPT

## 2019-10-09 PROCEDURE — 00000102 ZZHCL STATISTIC CYTO WRIGHT STAIN TC

## 2019-10-09 PROCEDURE — 76705 ECHO EXAM OF ABDOMEN: CPT

## 2019-10-09 PROCEDURE — 88112 CYTOPATH CELL ENHANCE TECH: CPT

## 2019-10-09 PROCEDURE — 85610 PROTHROMBIN TIME: CPT | Performed by: RADIOLOGY

## 2019-10-09 RX ORDER — LIDOCAINE HYDROCHLORIDE 10 MG/ML
10 INJECTION, SOLUTION EPIDURAL; INFILTRATION; INTRACAUDAL; PERINEURAL ONCE
Status: COMPLETED | OUTPATIENT
Start: 2019-10-09 | End: 2019-10-09

## 2019-10-09 RX ORDER — NICOTINE POLACRILEX 4 MG
15-30 LOZENGE BUCCAL
Status: DISCONTINUED | OUTPATIENT
Start: 2019-10-09 | End: 2019-10-09 | Stop reason: HOSPADM

## 2019-10-09 RX ORDER — DEXTROSE MONOHYDRATE 25 G/50ML
25-50 INJECTION, SOLUTION INTRAVENOUS
Status: DISCONTINUED | OUTPATIENT
Start: 2019-10-09 | End: 2019-10-09 | Stop reason: HOSPADM

## 2019-10-09 RX ADMIN — LIDOCAINE HYDROCHLORIDE 10 ML: 10 INJECTION, SOLUTION EPIDURAL; INFILTRATION; INTRACAUDAL; PERINEURAL at 09:53

## 2019-10-09 NOTE — PROGRESS NOTES
Care Suites Discharge Summary    Discharge Criteria:   Discharge Criteria met per MD orders: Yes.   Vital signs stable.     Pt demonstrates ability to ambulate safely: Yes.  (See discharge questionnaire for additional information)    Discharge instructions & education:   Discharge instructions reviewed with patient. Patient verbalizes  understanding.   Additional patient education provided:  Paracentesis    Medications:   Patient will be discharging on new medications- No. Patient verbalizes reason for use, start date, and side effects NA.    Items returned to patient:   Home and hospital acquired medications returned to patient NA   Listed belongings gathered and returned to patient: Yes    Patient discharged to home with spouse.    Darinel Fenton, KP

## 2019-10-09 NOTE — DISCHARGE INSTRUCTIONS

## 2019-10-09 NOTE — PROGRESS NOTES
Care Suites Post Procedure Summary (without sedation)     Immediately prior to starting the procedure a Time Out was conducted with procedural staff and re-confirmed the patient s name, procedure, and site/side.      Consent obtained from patient after discussing the risks, benefits and alternatives.      Procedure: Paracentesis    Procedure Interventions:    Fluid (cc) removed: Yes. 3700 ml of yellow colored fluid removed from right abdomen without difficulties.     Patient tolerance: Patient tolerated the procedure well with no immediate complications.  Site dressed by tech at completion with band aid. No bleeding or drainage at completion.  .    (See Doc Flow-sheets and MAR for additional information)

## 2019-10-09 NOTE — PROGRESS NOTES
Care Suites Admission Nursing Note    Reason for admission: paracentesis  CS arrival time: 0830  Accompanied by: spouse  Name/phone of DC : Albino - spouse  Medications held: none  Consent signed: to be done at bedside by MD  Abnormal assessment/labs: None  Education/questions answered: Yes  Plan: proceed

## 2019-10-09 NOTE — PROCEDURES
[facility]    Procedure: Paracentesis  Date/Time: 10/9/2019 9:56 AM  Performed by: Padma Neal PA-C  Authorized by: Padma Neal PA-C     UNIVERSAL PROTOCOL   Site Marked: Yes  Prior Images Obtained and Reviewed:  Yes  Required items: Required blood products, implants, devices and special equipment available    Patient identity confirmed:  Verbally with patient  NA - No sedation, light sedation, or local anesthesia  Confirmation Checklist:  Patient's identity using two indicators, relevant allergies, procedure was appropriate and matched the consent or emergent situation and correct equipment/implants were available  Time out: Immediately prior to the procedure a time out was called    Preparation: Patient was prepped and draped in usual sterile fashion    ESBL (mL):  1     ANESTHESIA    Anesthesia: Local infiltration  Local Anesthetic:  Lidocaine 1% without epinephrine  Anesthetic Total (mL):  10      SEDATION    Patient Sedated: No    See dictated procedure note for full details.  PROCEDURE   Patient Tolerance:  Patient tolerated the procedure well with no immediate complications  Describe Procedure: Paracentesis  Hannah colored fluid removed  Time of Sedation in Minutes by Physician:  0

## 2019-10-11 LAB — COPATH REPORT: NORMAL

## 2019-10-11 NOTE — RESULT ENCOUNTER NOTE
Please call pt with results below:     I did not order the below US.  ? Is pt following up with GI and/or surgery re: her ascites and/or possible gallstones.       For additional lab test information, [email or web address] is an excellent reference.

## 2019-10-14 LAB
BACTERIA SPEC CULT: NO GROWTH
SPECIMEN SOURCE: NORMAL

## 2019-10-16 ENCOUNTER — TRANSFERRED RECORDS (OUTPATIENT)
Dept: HEALTH INFORMATION MANAGEMENT | Facility: CLINIC | Age: 54
End: 2019-10-16

## 2019-10-17 DIAGNOSIS — Z53.9 ERRONEOUS ENCOUNTER--DISREGARD: Primary | ICD-10-CM

## 2019-10-21 ENCOUNTER — APPOINTMENT (OUTPATIENT)
Dept: NUCLEAR MEDICINE | Facility: CLINIC | Age: 54
DRG: 445 | End: 2019-10-21
Attending: SURGERY
Payer: COMMERCIAL

## 2019-10-21 ENCOUNTER — HOSPITAL ENCOUNTER (INPATIENT)
Facility: CLINIC | Age: 54
LOS: 1 days | Discharge: ANOTHER HOSPITAL | DRG: 445 | End: 2019-10-22
Attending: EMERGENCY MEDICINE | Admitting: INTERNAL MEDICINE
Payer: COMMERCIAL

## 2019-10-21 ENCOUNTER — APPOINTMENT (OUTPATIENT)
Dept: ULTRASOUND IMAGING | Facility: CLINIC | Age: 54
DRG: 445 | End: 2019-10-21
Attending: EMERGENCY MEDICINE
Payer: COMMERCIAL

## 2019-10-21 DIAGNOSIS — K81.0 ACUTE CHOLECYSTITIS: ICD-10-CM

## 2019-10-21 PROBLEM — K80.50 BILIARY COLIC: Status: ACTIVE | Noted: 2019-10-21

## 2019-10-21 LAB
ALBUMIN SERPL-MCNC: 2.2 G/DL (ref 3.4–5)
ALBUMIN UR-MCNC: 30 MG/DL
ALP SERPL-CCNC: 153 U/L (ref 40–150)
ALT SERPL W P-5'-P-CCNC: 47 U/L (ref 0–50)
ANION GAP SERPL CALCULATED.3IONS-SCNC: 8 MMOL/L (ref 3–14)
APPEARANCE UR: CLEAR
AST SERPL W P-5'-P-CCNC: 98 U/L (ref 0–45)
BACTERIA #/AREA URNS HPF: ABNORMAL /HPF
BASOPHILS # BLD AUTO: 0 10E9/L (ref 0–0.2)
BASOPHILS NFR BLD AUTO: 0.5 %
BILIRUB SERPL-MCNC: 2.7 MG/DL (ref 0.2–1.3)
BILIRUB UR QL STRIP: ABNORMAL
BUN SERPL-MCNC: 6 MG/DL (ref 7–30)
CALCIUM SERPL-MCNC: 8.3 MG/DL (ref 8.5–10.1)
CHLORIDE SERPL-SCNC: 103 MMOL/L (ref 94–109)
CO2 SERPL-SCNC: 21 MMOL/L (ref 20–32)
COLOR UR AUTO: YELLOW
CREAT SERPL-MCNC: 0.6 MG/DL (ref 0.52–1.04)
DIFFERENTIAL METHOD BLD: ABNORMAL
EOSINOPHIL # BLD AUTO: 0 10E9/L (ref 0–0.7)
EOSINOPHIL NFR BLD AUTO: 0.7 %
ERYTHROCYTE [DISTWIDTH] IN BLOOD BY AUTOMATED COUNT: 13.8 % (ref 10–15)
ETHANOL SERPL-MCNC: <0.01 G/DL
GFR SERPL CREATININE-BSD FRML MDRD: >90 ML/MIN/{1.73_M2}
GLUCOSE SERPL-MCNC: 118 MG/DL (ref 70–99)
GLUCOSE UR STRIP-MCNC: NEGATIVE MG/DL
HCT VFR BLD AUTO: 39.7 % (ref 35–47)
HGB BLD-MCNC: 13.2 G/DL (ref 11.7–15.7)
HGB UR QL STRIP: ABNORMAL
IMM GRANULOCYTES # BLD: 0 10E9/L (ref 0–0.4)
IMM GRANULOCYTES NFR BLD: 0.2 %
INR PPP: 1.5 (ref 0.86–1.14)
INTERPRETATION ECG - MUSE: NORMAL
KETONES UR STRIP-MCNC: ABNORMAL MG/DL
LEUKOCYTE ESTERASE UR QL STRIP: ABNORMAL
LIPASE SERPL-CCNC: 554 U/L (ref 73–393)
LYMPHOCYTES # BLD AUTO: 1.4 10E9/L (ref 0.8–5.3)
LYMPHOCYTES NFR BLD AUTO: 35.4 %
MCH RBC QN AUTO: 35.5 PG (ref 26.5–33)
MCHC RBC AUTO-ENTMCNC: 33.2 G/DL (ref 31.5–36.5)
MCV RBC AUTO: 107 FL (ref 78–100)
MONOCYTES # BLD AUTO: 0.4 10E9/L (ref 0–1.3)
MONOCYTES NFR BLD AUTO: 9.4 %
MUCOUS THREADS #/AREA URNS LPF: PRESENT /LPF
NEUTROPHILS # BLD AUTO: 2.2 10E9/L (ref 1.6–8.3)
NEUTROPHILS NFR BLD AUTO: 53.8 %
NITRATE UR QL: NEGATIVE
NRBC # BLD AUTO: 0 10*3/UL
NRBC BLD AUTO-RTO: 0 /100
PH UR STRIP: 7.5 PH (ref 5–7)
PLATELET # BLD AUTO: 116 10E9/L (ref 150–450)
POTASSIUM SERPL-SCNC: 3.4 MMOL/L (ref 3.4–5.3)
PROT SERPL-MCNC: 9.4 G/DL (ref 6.8–8.8)
RBC # BLD AUTO: 3.72 10E12/L (ref 3.8–5.2)
RBC #/AREA URNS AUTO: 2 /HPF (ref 0–2)
SODIUM SERPL-SCNC: 132 MMOL/L (ref 133–144)
SOURCE: ABNORMAL
SP GR UR STRIP: 1.02 (ref 1–1.03)
SQUAMOUS #/AREA URNS AUTO: <1 /HPF (ref 0–1)
TROPONIN I SERPL-MCNC: <0.015 UG/L (ref 0–0.04)
UROBILINOGEN UR STRIP-MCNC: 8 MG/DL (ref 0–2)
WBC # BLD AUTO: 4 10E9/L (ref 4–11)
WBC #/AREA URNS AUTO: 8 /HPF (ref 0–5)

## 2019-10-21 PROCEDURE — 25000128 H RX IP 250 OP 636: Performed by: INTERNAL MEDICINE

## 2019-10-21 PROCEDURE — 80320 DRUG SCREEN QUANTALCOHOLS: CPT | Performed by: EMERGENCY MEDICINE

## 2019-10-21 PROCEDURE — A9537 TC99M MEBROFENIN: HCPCS | Performed by: INTERNAL MEDICINE

## 2019-10-21 PROCEDURE — 83690 ASSAY OF LIPASE: CPT | Performed by: EMERGENCY MEDICINE

## 2019-10-21 PROCEDURE — 96375 TX/PRO/DX INJ NEW DRUG ADDON: CPT

## 2019-10-21 PROCEDURE — 78226 HEPATOBILIARY SYSTEM IMAGING: CPT

## 2019-10-21 PROCEDURE — 25800030 ZZH RX IP 258 OP 636: Performed by: INTERNAL MEDICINE

## 2019-10-21 PROCEDURE — 25000125 ZZHC RX 250: Performed by: EMERGENCY MEDICINE

## 2019-10-21 PROCEDURE — 85610 PROTHROMBIN TIME: CPT | Performed by: EMERGENCY MEDICINE

## 2019-10-21 PROCEDURE — 96361 HYDRATE IV INFUSION ADD-ON: CPT

## 2019-10-21 PROCEDURE — 81001 URINALYSIS AUTO W/SCOPE: CPT | Performed by: EMERGENCY MEDICINE

## 2019-10-21 PROCEDURE — 76705 ECHO EXAM OF ABDOMEN: CPT

## 2019-10-21 PROCEDURE — 25000132 ZZH RX MED GY IP 250 OP 250 PS 637: Performed by: EMERGENCY MEDICINE

## 2019-10-21 PROCEDURE — 80053 COMPREHEN METABOLIC PANEL: CPT | Performed by: EMERGENCY MEDICINE

## 2019-10-21 PROCEDURE — 84484 ASSAY OF TROPONIN QUANT: CPT | Performed by: EMERGENCY MEDICINE

## 2019-10-21 PROCEDURE — 25000132 ZZH RX MED GY IP 250 OP 250 PS 637: Performed by: INTERNAL MEDICINE

## 2019-10-21 PROCEDURE — 85025 COMPLETE CBC W/AUTO DIFF WBC: CPT | Performed by: EMERGENCY MEDICINE

## 2019-10-21 PROCEDURE — 99285 EMERGENCY DEPT VISIT HI MDM: CPT | Mod: 25

## 2019-10-21 PROCEDURE — 34300033 ZZH RX 343: Performed by: INTERNAL MEDICINE

## 2019-10-21 PROCEDURE — 96365 THER/PROPH/DIAG IV INF INIT: CPT

## 2019-10-21 PROCEDURE — 99223 1ST HOSP IP/OBS HIGH 75: CPT | Mod: AI | Performed by: INTERNAL MEDICINE

## 2019-10-21 PROCEDURE — 25000128 H RX IP 250 OP 636: Performed by: EMERGENCY MEDICINE

## 2019-10-21 PROCEDURE — 93005 ELECTROCARDIOGRAM TRACING: CPT

## 2019-10-21 PROCEDURE — 99203 OFFICE O/P NEW LOW 30 MIN: CPT | Performed by: SURGERY

## 2019-10-21 PROCEDURE — 12000000 ZZH R&B MED SURG/OB

## 2019-10-21 RX ORDER — BISACODYL 10 MG
10 SUPPOSITORY, RECTAL RECTAL DAILY PRN
Status: DISCONTINUED | OUTPATIENT
Start: 2019-10-21 | End: 2019-10-22 | Stop reason: HOSPADM

## 2019-10-21 RX ORDER — POLYETHYLENE GLYCOL 3350 17 G/17G
17 POWDER, FOR SOLUTION ORAL DAILY PRN
Status: DISCONTINUED | OUTPATIENT
Start: 2019-10-21 | End: 2019-10-22 | Stop reason: HOSPADM

## 2019-10-21 RX ORDER — FUROSEMIDE 40 MG
40 TABLET ORAL DAILY
Status: DISCONTINUED | OUTPATIENT
Start: 2019-10-21 | End: 2019-10-22

## 2019-10-21 RX ORDER — MULTIPLE VITAMINS W/ MINERALS TAB 9MG-400MCG
1 TAB ORAL DAILY
Status: DISCONTINUED | OUTPATIENT
Start: 2019-10-21 | End: 2019-10-22 | Stop reason: HOSPADM

## 2019-10-21 RX ORDER — SPIRONOLACTONE 100 MG/1
100 TABLET, FILM COATED ORAL DAILY
COMMUNITY
Start: 2019-10-16 | End: 2020-01-30

## 2019-10-21 RX ORDER — AMOXICILLIN 250 MG
2 CAPSULE ORAL 2 TIMES DAILY
Status: DISCONTINUED | OUTPATIENT
Start: 2019-10-21 | End: 2019-10-22 | Stop reason: HOSPADM

## 2019-10-21 RX ORDER — LISINOPRIL 10 MG/1
10 TABLET ORAL
Status: ON HOLD | COMMUNITY
Start: 2019-03-30 | End: 2019-10-21

## 2019-10-21 RX ORDER — SPIRONOLACTONE 100 MG/1
100 TABLET, FILM COATED ORAL DAILY
Status: DISCONTINUED | OUTPATIENT
Start: 2019-10-21 | End: 2019-10-22

## 2019-10-21 RX ORDER — AMOXICILLIN 250 MG
1 CAPSULE ORAL 2 TIMES DAILY PRN
Status: DISCONTINUED | OUTPATIENT
Start: 2019-10-21 | End: 2019-10-22 | Stop reason: HOSPADM

## 2019-10-21 RX ORDER — LIDOCAINE 40 MG/G
CREAM TOPICAL
Status: DISCONTINUED | OUTPATIENT
Start: 2019-10-21 | End: 2019-10-22 | Stop reason: HOSPADM

## 2019-10-21 RX ORDER — AMPICILLIN AND SULBACTAM 2; 1 G/1; G/1
3 INJECTION, POWDER, FOR SOLUTION INTRAMUSCULAR; INTRAVENOUS ONCE
Status: COMPLETED | OUTPATIENT
Start: 2019-10-21 | End: 2019-10-21

## 2019-10-21 RX ORDER — SODIUM CHLORIDE, SODIUM LACTATE, POTASSIUM CHLORIDE, CALCIUM CHLORIDE 600; 310; 30; 20 MG/100ML; MG/100ML; MG/100ML; MG/100ML
INJECTION, SOLUTION INTRAVENOUS CONTINUOUS
Status: DISCONTINUED | OUTPATIENT
Start: 2019-10-21 | End: 2019-10-22 | Stop reason: HOSPADM

## 2019-10-21 RX ORDER — ONDANSETRON 4 MG/1
4 TABLET, ORALLY DISINTEGRATING ORAL EVERY 6 HOURS PRN
Status: DISCONTINUED | OUTPATIENT
Start: 2019-10-21 | End: 2019-10-22 | Stop reason: HOSPADM

## 2019-10-21 RX ORDER — CALCIUM/MAGNESIUM/ZINC 333-133 MG
1 TABLET ORAL 2 TIMES DAILY
COMMUNITY
End: 2023-06-13

## 2019-10-21 RX ORDER — KIT FOR THE PREPARATION OF TECHNETIUM TC 99M MEBROFENIN 45 MG/10ML
6 INJECTION, POWDER, LYOPHILIZED, FOR SOLUTION INTRAVENOUS ONCE
Status: COMPLETED | OUTPATIENT
Start: 2019-10-21 | End: 2019-10-21

## 2019-10-21 RX ORDER — AMPICILLIN AND SULBACTAM 2; 1 G/1; G/1
3 INJECTION, POWDER, FOR SOLUTION INTRAMUSCULAR; INTRAVENOUS EVERY 6 HOURS
Status: DISCONTINUED | OUTPATIENT
Start: 2019-10-21 | End: 2019-10-22 | Stop reason: HOSPADM

## 2019-10-21 RX ORDER — AMOXICILLIN 250 MG
1 CAPSULE ORAL 2 TIMES DAILY
Status: DISCONTINUED | OUTPATIENT
Start: 2019-10-21 | End: 2019-10-22 | Stop reason: HOSPADM

## 2019-10-21 RX ORDER — AMOXICILLIN 250 MG
2 CAPSULE ORAL 2 TIMES DAILY PRN
Status: DISCONTINUED | OUTPATIENT
Start: 2019-10-21 | End: 2019-10-22 | Stop reason: HOSPADM

## 2019-10-21 RX ORDER — LISINOPRIL 10 MG/1
10 TABLET ORAL DAILY
Status: DISCONTINUED | OUTPATIENT
Start: 2019-10-21 | End: 2019-10-22 | Stop reason: HOSPADM

## 2019-10-21 RX ORDER — PROCHLORPERAZINE 25 MG
25 SUPPOSITORY, RECTAL RECTAL EVERY 12 HOURS PRN
Status: DISCONTINUED | OUTPATIENT
Start: 2019-10-21 | End: 2019-10-22 | Stop reason: HOSPADM

## 2019-10-21 RX ORDER — ONDANSETRON 2 MG/ML
4 INJECTION INTRAMUSCULAR; INTRAVENOUS EVERY 30 MIN PRN
Status: DISCONTINUED | OUTPATIENT
Start: 2019-10-21 | End: 2019-10-21

## 2019-10-21 RX ORDER — NALOXONE HYDROCHLORIDE 0.4 MG/ML
.1-.4 INJECTION, SOLUTION INTRAMUSCULAR; INTRAVENOUS; SUBCUTANEOUS
Status: DISCONTINUED | OUTPATIENT
Start: 2019-10-21 | End: 2019-10-22 | Stop reason: HOSPADM

## 2019-10-21 RX ORDER — SODIUM CHLORIDE 9 MG/ML
1000 INJECTION, SOLUTION INTRAVENOUS CONTINUOUS
Status: DISCONTINUED | OUTPATIENT
Start: 2019-10-21 | End: 2019-10-22 | Stop reason: HOSPADM

## 2019-10-21 RX ORDER — FUROSEMIDE 40 MG
40 TABLET ORAL DAILY
COMMUNITY
Start: 2019-10-16 | End: 2020-01-30

## 2019-10-21 RX ORDER — PROCHLORPERAZINE MALEATE 5 MG
10 TABLET ORAL EVERY 6 HOURS PRN
Status: DISCONTINUED | OUTPATIENT
Start: 2019-10-21 | End: 2019-10-22 | Stop reason: HOSPADM

## 2019-10-21 RX ORDER — HYDROMORPHONE HYDROCHLORIDE 1 MG/ML
.3-.5 INJECTION, SOLUTION INTRAMUSCULAR; INTRAVENOUS; SUBCUTANEOUS
Status: DISCONTINUED | OUTPATIENT
Start: 2019-10-21 | End: 2019-10-22 | Stop reason: HOSPADM

## 2019-10-21 RX ORDER — ONDANSETRON 2 MG/ML
4 INJECTION INTRAMUSCULAR; INTRAVENOUS EVERY 6 HOURS PRN
Status: DISCONTINUED | OUTPATIENT
Start: 2019-10-21 | End: 2019-10-22 | Stop reason: HOSPADM

## 2019-10-21 RX ORDER — HYDROMORPHONE HYDROCHLORIDE 1 MG/ML
0.5 INJECTION, SOLUTION INTRAMUSCULAR; INTRAVENOUS; SUBCUTANEOUS
Status: COMPLETED | OUTPATIENT
Start: 2019-10-21 | End: 2019-10-21

## 2019-10-21 RX ORDER — VITAMIN B COMPLEX
25 TABLET ORAL DAILY
Status: DISCONTINUED | OUTPATIENT
Start: 2019-10-21 | End: 2019-10-21 | Stop reason: CLARIF

## 2019-10-21 RX ORDER — ACETAMINOPHEN 325 MG/1
325-650 TABLET ORAL EVERY 6 HOURS PRN
Status: DISCONTINUED | OUTPATIENT
Start: 2019-10-21 | End: 2019-10-21 | Stop reason: CLARIF

## 2019-10-21 RX ADMIN — HYDROMORPHONE HYDROCHLORIDE 0.5 MG: 1 INJECTION, SOLUTION INTRAMUSCULAR; INTRAVENOUS; SUBCUTANEOUS at 04:28

## 2019-10-21 RX ADMIN — SODIUM CHLORIDE, POTASSIUM CHLORIDE, SODIUM LACTATE AND CALCIUM CHLORIDE: 600; 310; 30; 20 INJECTION, SOLUTION INTRAVENOUS at 20:18

## 2019-10-21 RX ADMIN — ONDANSETRON HYDROCHLORIDE 4 MG: 2 INJECTION, SOLUTION INTRAMUSCULAR; INTRAVENOUS at 02:38

## 2019-10-21 RX ADMIN — HYDROMORPHONE HYDROCHLORIDE 0.3 MG: 1 INJECTION, SOLUTION INTRAMUSCULAR; INTRAVENOUS; SUBCUTANEOUS at 08:28

## 2019-10-21 RX ADMIN — FAMOTIDINE 20 MG: 10 INJECTION, SOLUTION INTRAVENOUS at 02:38

## 2019-10-21 RX ADMIN — AMPICILLIN SODIUM AND SULBACTAM SODIUM 3 G: 2; 1 INJECTION, POWDER, FOR SOLUTION INTRAMUSCULAR; INTRAVENOUS at 20:15

## 2019-10-21 RX ADMIN — MEBROFENIN 6 MILLICURIE: 45 INJECTION, POWDER, LYOPHILIZED, FOR SOLUTION INTRAVENOUS at 15:25

## 2019-10-21 RX ADMIN — AMPICILLIN SODIUM AND SULBACTAM SODIUM 3 G: 2; 1 INJECTION, POWDER, FOR SOLUTION INTRAMUSCULAR; INTRAVENOUS at 05:28

## 2019-10-21 RX ADMIN — PHYTONADIONE 5 MG: 10 INJECTION, EMULSION INTRAMUSCULAR; INTRAVENOUS; SUBCUTANEOUS at 08:45

## 2019-10-21 RX ADMIN — HYDROMORPHONE HYDROCHLORIDE 0.5 MG: 1 INJECTION, SOLUTION INTRAMUSCULAR; INTRAVENOUS; SUBCUTANEOUS at 04:12

## 2019-10-21 RX ADMIN — LISINOPRIL 10 MG: 10 TABLET ORAL at 12:16

## 2019-10-21 RX ADMIN — HYDROMORPHONE HYDROCHLORIDE 0.3 MG: 1 INJECTION, SOLUTION INTRAMUSCULAR; INTRAVENOUS; SUBCUTANEOUS at 18:10

## 2019-10-21 RX ADMIN — SODIUM CHLORIDE, POTASSIUM CHLORIDE, SODIUM LACTATE AND CALCIUM CHLORIDE: 600; 310; 30; 20 INJECTION, SOLUTION INTRAVENOUS at 08:24

## 2019-10-21 RX ADMIN — SODIUM CHLORIDE 1000 ML: 9 INJECTION, SOLUTION INTRAVENOUS at 02:38

## 2019-10-21 RX ADMIN — AMPICILLIN SODIUM AND SULBACTAM SODIUM 3 G: 2; 1 INJECTION, POWDER, FOR SOLUTION INTRAMUSCULAR; INTRAVENOUS at 13:46

## 2019-10-21 RX ADMIN — LIDOCAINE HYDROCHLORIDE 30 ML: 20 SOLUTION ORAL; TOPICAL at 02:38

## 2019-10-21 RX ADMIN — HYDROMORPHONE HYDROCHLORIDE 0.5 MG: 1 INJECTION, SOLUTION INTRAMUSCULAR; INTRAVENOUS; SUBCUTANEOUS at 03:53

## 2019-10-21 ASSESSMENT — ENCOUNTER SYMPTOMS
DIARRHEA: 0
ABDOMINAL PAIN: 1
NAUSEA: 0
ABDOMINAL DISTENTION: 1
BACK PAIN: 1
BLOOD IN STOOL: 0
VOMITING: 1

## 2019-10-21 ASSESSMENT — ACTIVITIES OF DAILY LIVING (ADL)
ADLS_ACUITY_SCORE: 11
ADLS_ACUITY_SCORE: 13

## 2019-10-21 NOTE — ED NOTES
[facility]  ED Nurse Handoff Report    Abiola Matute is a 54 year old female   ED Chief complaint: Abdominal Pain  . ED Diagnosis:   Final diagnoses:   Acute cholecystitis     Allergies:   Allergies   Allergen Reactions     Fish Oil      Redness and itching around eye area only - went away when fish oil capsules stopped      Metronidazole      pain/itching     Naphthalenemethylamines      Lamisil = mild urticarial reaction     Penicillins Cramps     Vomiting     Ppd [Tuberculin Purified Protein Derivative]      Sulfa Drugs      hives       Code Status: Full Code  Activity level - Baseline/Home:  Independent. Activity Level - Current:   Independent. Lift room needed: No. Bariatric: No   Needed: No   Isolation: No. Infection: Not Applicable.     Vital Signs:   Vitals:    10/21/19 0202 10/21/19 0455   BP: (!) 141/93 112/68   Pulse: 85 99   Resp: 18    Temp: 97.6  F (36.4  C)    TempSrc: Oral    SpO2: 99% 97%       Cardiac Rhythm:  ,      Pain level: 0-10 Pain Scale: 6  Patient confused: No. Patient Falls Risk: No.   Elimination Status: Has voided   Patient Report - Initial Complaint: abdominal pain. Focused Assessment: Abiola Matute is a 54 year old female who presents to the emergency department today with abdominal pain. The patient reports feeling like her abdomen is bloated, with associated pain that radiates to her chest and around to the back. Patient had a paracentesis a week and a half ago, during which they took out 3.5 L. She notes diarrhea, nausea, and 2 episodes of vomiting. She denies black or bloody stool. She has had this issue in the past, but it is much worse tonight. The patient denies alcohol use. The patient took Oxycodone and Tums around 2330, but vomited after.    Tests Performed:   Labs Ordered and Resulted from Time of ED Arrival Up to the Time of Departure from the ED   CBC WITH PLATELETS DIFFERENTIAL - Abnormal; Notable for the following components:       Result  Value    RBC Count 3.72 (*)      (*)     MCH 35.5 (*)     Platelet Count 116 (*)     All other components within normal limits   COMPREHENSIVE METABOLIC PANEL - Abnormal; Notable for the following components:    Sodium 132 (*)     Glucose 118 (*)     Urea Nitrogen 6 (*)     Calcium 8.3 (*)     Bilirubin Total 2.7 (*)     Albumin 2.2 (*)     Protein Total 9.4 (*)     Alkaline Phosphatase 153 (*)     AST 98 (*)     All other components within normal limits   LIPASE - Abnormal; Notable for the following components:    Lipase 554 (*)     All other components within normal limits   ROUTINE UA WITH MICROSCOPIC - Abnormal; Notable for the following components:    Bilirubin Urine Small (*)     Ketones Urine Trace (*)     Blood Urine Moderate (*)     pH Urine 7.5 (*)     Protein Albumin Urine 30 (*)     Urobilinogen mg/dL 8.0 (*)     Leukocyte Esterase Urine Moderate (*)     WBC Urine 8 (*)     Bacteria Urine Few (*)     Mucous Urine Present (*)     All other components within normal limits   INR - Abnormal; Notable for the following components:    INR 1.50 (*)     All other components within normal limits   ALCOHOL ETHYL   TROPONIN I   PERIPHERAL IV CATHETER     US Abdomen Limited   Final Result   IMPRESSION:   1. Several small gallstones are present within a mildly distended gallbladder. Additionally, the ultrasound technologist reports the patient has focal tenderness over the gallbladder. These findings are not diagnostic of acute cholecystitis, but it is    possible. If clinically relevant, HIDA scan could be considered for further evaluation.   2. Borderline dilatation of the common duct.   3. Cirrhotic-appearing liver.   4. A small amount of free fluid in the upper right hemiabdomen.         Abnormal Results: see above.   Treatments provided: see MAR  Family Comments:  present  OBS brochure/video discussed/provided to patient:  N/A  ED Medications:   Medications   ondansetron (ZOFRAN) injection 4 mg (4  mg Intravenous Given 10/21/19 0238)   0.9% sodium chloride BOLUS (0 mLs Intravenous Stopped 10/21/19 0454)     Followed by   sodium chloride 0.9% infusion (has no administration in time range)   ampicillin-sulbactam (UNASYN) 3 g vial to attach to  mL bag (has no administration in time range)   famotidine (PEPCID) injection 20 mg (20 mg Intravenous Given 10/21/19 0238)   lidocaine (XYLOCAINE) 2 % 15 mL, alum & mag hydroxide-simethicone (MYLANTA ES/MAALOX  ES) 15 mL GI Cocktail (30 mLs Oral Given 10/21/19 0238)   HYDROmorphone (PF) (DILAUDID) injection 0.5 mg (0.5 mg Intravenous Given 10/21/19 0428)     Drips infusing:  Yes-IV abx  For the majority of the shift, the patient's behavior Green. Interventions performed were none.     Severe Sepsis OR Septic Shock Diagnosis Present: No      ED Nurse Name/Phone Number: Hossein Mckeon RN,   5:14 AM  RECEIVING UNIT ED HANDOFF REVIEW    Above ED Nurse Handoff Report was reviewed: Yes  Reviewed by: Hannah Levy RN on October 21, 2019 at 6:06 AM

## 2019-10-21 NOTE — ED PROVIDER NOTES
History     Chief Complaint:  Abdominal Pain    HPI   Abiola Matute is a 54 year old female who presents to the emergency department today with abdominal pain. The patient reports feeling like her abdomen is bloated, with associated pain that radiates to her chest and around to the back. Patient had a paracentesis a week and a half ago, during which they took out 3.5 L. She notes diarrhea, nausea, and 2 episodes of vomiting. She denies black or bloody stool. She has had this issue in the past, but it is much worse tonight. The patient denies alcohol use. The patient took Oxycodone and Tums around 2330, but vomited after.     Allergies:  Fish Oil  Metronidazole  Naphthalenemethylamines  Penicillins  Ppd [Tuberculin Purified Protein Derivative]  Sulfa Drugs     Medications:    Prinivil  Ativan      Past Medical History:    AA (alcohol abuse) - ? ongoing   Abnormal liver enzymes- ? related to ongoing etoh use/abuse  Acquired hyperbilirubinemia- ? secondary to alcohol use  Alcoholic fatty liver  Atypical ductal hyperplasia of left breast  Bifid uvula  Claustrophobia  Contact dermatitis and other eczema, due to unspecified cause  Diffuse nodular cirrhosis of liver   Essential hypertension with goal blood pressure less than 140/90  Gastroenteritis  Hypertension  IBS (irritable bowel syndrome)  Idiopathic thrombocytopenia   Intestinal infection due to Clostridium difficile  Iron deficiency anemia, unspecified iron deficiency anemia type  Non morbid obesity due to excess calories  Obesity, unspecified  Other congenital malformations of mouth  Other isolated or specific phobias  Postmenopausal- s/p hysterectomy with BSO - not on HRT secondary to atypical ductal hyperplasia & breast pain -no paps needed  Rosacea  Severe Perianal Crohn's Disease  Stool incontinence    Past Surgical History:    S/P total hysterectomy and bilateral salpingo-oophorectomy  Biopsy anal  Breast biopsy  Appendectomy  Ankle fracture repair    Hysterectomy   Pelviscopy      Family History:    Breast cancer  Gastrointestinal disease  Heart disease  Diabetes  Cancer     Social History:  The patient was accompanied to the ED by .  Smoking Status: Never  Smokeless Tobacco: Never  Alcohol Use: Yes   Marital Status:       Review of Systems   Cardiovascular: Positive for chest pain.   Gastrointestinal: Positive for abdominal distention, abdominal pain and vomiting. Negative for blood in stool, diarrhea and nausea.   Musculoskeletal: Positive for back pain.   All other systems reviewed and are negative.        Physical Exam     Patient Vitals for the past 24 hrs:   BP Temp Temp src Pulse Heart Rate Resp SpO2   10/21/19 0455 112/68 -- -- 99 -- -- 97 %   10/21/19 0202 (!) 141/93 97.6  F (36.4  C) Oral 85 85 18 99 %      Physical Exam  Constitutional:  Oriented to person, place, and time. Chronically ill.   HENT:   Head:    Normocephalic.   Mouth/Throat:   Oropharynx is clear and moist.   Eyes:    EOM are normal. Pupils are equal, round, and reactive to light.   Neck:    Neck supple.   Cardiovascular:  Normal rate, regular rhythm and normal heart sounds.      Exam reveals no gallop and no friction rub.       No murmur heard.  Pulmonary/Chest:  Effort normal and breath sounds normal.      No respiratory distress. No wheezes. No rales.      No reproducible chest wall pain.  Abdominal:   Soft. Epigastric, right upper quadrant tenderness. Negative delgado's sign. Positive fluid wave. No rebound and no guarding.   Musculoskeletal:  Normal range of motion.   Neurological:   Alert and oriented to person, place, and time.           Moves all 4 extremities spontaneously    Skin:    No rash noted. Jaundice.      Emergency Department Course   ECG:  Indication: chest pain  Completed at 0317.  Read at 0317.   Normal sinus rhythm with sinus arrhythmia   Prolonged QT  Abnormal ECG    Rate 82 bpm. OK interval 126. QRS duration 86. QT/QTc 418/488. P-R-T axes 58 51 45.      Imaging:  Radiology findings were communicated with the patient and family who voiced understanding of the findings.  US Abdomen Limited   Final Result   IMPRESSION:   1. Several small gallstones are present within a mildly distended gallbladder. Additionally, the ultrasound technologist reports the patient has focal tenderness over the gallbladder. These findings are not diagnostic of acute cholecystitis, but it is    possible. If clinically relevant, HIDA scan could be considered for further evaluation.   2. Borderline dilatation of the common duct.   3. Cirrhotic-appearing liver.   4. A small amount of free fluid in the upper right hemiabdomen.      Report per radiology      Laboratory:  Laboratory findings were communicated with the patient and family who voiced understanding of the findings.  EtOH level: <0.01   UA: clear, yellow urine with small bilirubin, trace ketones, moderate blood, 7.5 pH, 30 protein albumin, 8.0 urobilinogen, moderate Leukocyte esterase, 8 WBC, few bacteria, mucous present o/w WNL   Troponin (Collected 0211): <0.015   INR: 1.50   Lipase: 554   CMP: 132 Na, 118 Glucose, 6 urea nitrogen, 8.3 Ca, 2.7 Bilirubin, 2.2 Albumin, 9.4 Protein total, 153 Alkaline phosphatase, 98 AST o/w WNL (Creatinine 0.60)   CBC: AWNL (WBC 4.0, HGB 13.2, )      Interventions:  0238: Zofran 4mg IV   0238: 0.9% NaCl 1L IV Bolus   0238: Pepcid 20 mg IV   0238: GI Cocktail 30mg PO   0353: Dilaudid 0.5mg IV    0412: Dilaudid 0.5mg IV    0428: Dilaudid 0.5mg IV    0528: Unasyn 3 g IV     Emergency Department Course:  Nursing notes and vitals reviewed.  0230: I performed an exam of the patient as documented above.   IV was inserted and blood was drawn for laboratory testing, results above.  The patient provided a urine sample here in the emergency department. This was sent for laboratory testing, findings above.  The patient was sent for a US Abdomen Limited while in the emergency department, results above.    0511: Findings and plan explained to the Patient who consents to admission. Discussed the patient with Dr. Lockhart, who will admit the patient to a Med/Surg bed for further monitoring, evaluation, and treatment.   I personally reviewed the laboratory and imaging results with the Patient and answered all related questions prior to admission.      Impression & Plan    Medical Decision Making:  Abiola Matute is a 54 year old female that came in complaining of epigastric, right upper quadrant abdominal pain. Differential includes biliary colic, cholecystitis, gastritis, peptic ulcer disease, ACS or equivalent, small bowel obstruction or other causes. Workup thus far does point toward likely cholelithiasis. Unfortunately, patient has had continued symptoms despite a number of doses of narcotics and will need further pain control and admission. I suspect early cholecystitis. Antibiotics have been started. She does have a history of liver cirrhosis, but symptoms are clearly biliary in nature. I would doubt SBP. Patient will be admitted for further pain control, monitoring.    Diagnosis:    ICD-10-CM    1. Acute cholecystitis K81.0        Disposition:  Admitted to Med/Surg     Scribe Disclosure:  INicolle MD, am serving as a scribe at 3:47 AM on 10/21/2019 to document services personally performed by Mg Guillory MD based on my observations and the provider's statements to me.    10/21/2019   [facility] EMERGENCY DEPARTMENT       Mg Guillory MD  10/21/19 0617

## 2019-10-21 NOTE — PHARMACY-ADMISSION MEDICATION HISTORY
Admission medication history interview status for this patient is complete. See [facility] admission navigator for allergy information, prior to admission medications and immunization status.     Medication history interview source(s):Patient  Medication history resources (including written lists, pill bottles, clinic record):None  Primary pharmacy: [facility]     Changes made to PTA medication list:  Added: milk thistle, cinnamon  Deleted: duplicate lisinopril   Changed: none     Actions taken by pharmacist (provider contacted, etc):None     Additional medication history information:None    Medication reconciliation/reorder completed by provider prior to medication history?  Yes     Do you take OTC medications (eg tylenol, ibuprofen, fish oil, eye/ear drops, etc)? Yes : milk thistle, tumeric and cinnamon         Prior to Admission medications    Medication Sig Last Dose Taking? Auth Provider   Alpha Lipoic Acid-Cr-Cinnamon (CINNAMON ALPHA LIPOIC AC CMPLX PO) Take 1 tablet by mouth daily  Yes Unknown, Entered By History   lisinopril (PRINIVIL/ZESTRIL) 10 MG tablet Take 1 tablet (10 mg) by mouth daily 10/20/2019 at 0800 Yes Linda Macdonald APRN CNP   LORazepam (ATIVAN) 0.5 MG tablet Take 1 tablet by mouth. 1-2 tabs 30-60 minutes prior to exam/procedure/flight  Yes Philly Goncalves MD   Milk Thistle-Dand-Fennel-Licor (MILK THISTLE XTRA) CAPS capsule Take 1 capsule by mouth daily  Yes Unknown, Entered By History   Multiple Vitamins-Minerals (MULTIVITAMIN & MINERAL PO) Take  by mouth daily. 10/20/2019 at 0800 Yes Reported, Patient   spironolactone (ALDACTONE) 100 MG tablet  10/20/2019 at 0800 Yes Reported, Patient   furosemide (LASIX) 40 MG tablet 40 mg daily  10/20/2019 at 0800  Reported, Patient

## 2019-10-21 NOTE — CONSULTS
General Surgery Consultation    Abiola Matute MRN# [id number]   Age: 54 year old YOB: 1965     Date of Admission:  10/21/2019    Reason for consult:            Abdominal pain       Requesting physician:            Fabio Ramos MD                Assessment and Plan:   Assessment:   Abiola Matute is a 54 year old female with EtOH cirrhosis, MELD 19, last paracentesis on 10/9 (3.5L) who presented with abdominal pain and was found to have cholelithiasis. She doesn't have gallbladder wall thickening and her gallbladder is only mildly distended, but she did have a positive Gil's sign. Her tenderness and symptoms are improved today.        Plan:   Given the patient's increased operative risk with cirrhosis (increasing risk of bleeding, bile leak, wound complications, bacterial peritonitis, etc.), I recommend obtaining a HIDA scan to determine if the gallbladder is truly obtructed. If the gallbladder fills, I would not recommend cholecystectomy. I am encouraged that her pain is already improving and that she is non-tender on exam.  - Agree with antibiotic coverage for possible cholecystitis  - HIDA scan  - Will follow    Susanna Valverde MD            Chief Complaint:   Abdominal pain     History is obtained from the patient.         History of Present Illness:   Abiola Matute is a 54 year old female who presents with epigastric region abdominal pain for the past 12 hours.  The pain is constant.  She has had similar pain in the past, approxiamtely 3 times in the past 3 years.  There is not in association with eating fatty foods.  Positive for associated symptoms of nausea and vomiting.  She  does not have a history of jaundice or dark urine.  She  has not had pancreatitis in the past.        Of note, the patient has cirrhosis secondary to alcohol.  She is status post paracentesis on 10/9 during which she had 3.7 L of ascites fluid removed.  She does have elevated bilirubin and INR and a MELD of 19.   Previous CT scan performed in February of this year showed evidence of portal venous hypertension including gastroesophageal varices and hemorrhoids.  Spleen was mildly enlarged.  Other significant past medical history includes Crohn's disease, but this seems to be concentrated in her perianal region and she has never been told that she has intestinal disease.           Past Medical History:   Atypical ductal hyperplasia of breast (9/10/10)  BIFID UVULA (aka UVULA)   Contact dermatitis and other eczema, due to unspecified cause  Hypertension   IBS (irritable bowel syndrome)  Julia-anal Crohn's          Past Surgical History:     Past Surgical History:   Procedure Laterality Date     BIOPSY ANAL N/A 3/28/2019    anal biopsy and culure placement of seton - Dr Fleming     BREAST BIOPSY, RT/LT  9/17/2010    left - scheduled with Dr. Kojo WILKERSON APPENDECTOMY  at age 14     COLONOSCOPY  2006     COLONOSCOPY N/A 12/23/2014    Procedure: COMBINED COLONOSCOPY, SINGLE OR MULTIPLE BIOPSY/POLYPECTOMY BY BIOPSY;  Surgeon: Diane Fleming MD;  Location:  GI     EXAM UNDER ANESTHESIA ANUS N/A 3/28/2019    Procedure: EXAM UNDER ANESTHESIA ANUS;  Surgeon: Diane Fleming MD;  Location:  OR     HC CLOSED TX BIMALLEOLAR ANKLE FX W/O MANIPULATION  at age 27    left ankle ORIF, plates and screws removed at age 36     HYSTERECTOMY, VAGINAL  2006    with Dr. Licha Zhou - with BSO for fibroids      SURGICAL HISTORY OF -   4/15/2010    Pelviscopy with removal of bilateral hydrosalpinges.              Social History:     Social History     Tobacco Use     Smoking status: Never Smoker     Smokeless tobacco: Never Used   Substance Use Topics     Alcohol use: Yes     Alcohol/week: 0.0 standard drinks     Comment: occasional             Family History:   Family history reviewed and is not pertinent.         Allergies:     Allergies   Allergen Reactions     Fish Oil      Redness and itching around eye area only - went away when  fish oil capsules stopped      Metronidazole      pain/itching     Naphthalenemethylamines      Lamisil = mild urticarial reaction     Penicillins Cramps     Vomiting     Ppd [Tuberculin Purified Protein Derivative]      Sulfa Drugs      hives             Medications:   No current facility-administered medications on file prior to encounter.   lisinopril (PRINIVIL/ZESTRIL) 10 MG tablet, Take 10 mg by mouth  acetaminophen (TYLENOL) 325 MG tablet, Take 325-650 mg by mouth every 6 hours as needed for mild pain  Calcium Carbonate (CALCIUM 500 PO), Take 1 tablet by mouth daily   cholecalciferol (VITAMIN D) 1000 UNIT tablet, Take 1 tablet by mouth daily.  furosemide (LASIX) 40 MG tablet,   lisinopril (PRINIVIL/ZESTRIL) 10 MG tablet, Take 1 tablet (10 mg) by mouth daily  LORazepam (ATIVAN) 0.5 MG tablet, Take 1 tablet by mouth. 1-2 tabs 30-60 minutes prior to exam/procedure/flight  Multiple Vitamins-Minerals (MULTIVITAMIN & MINERAL PO), Take  by mouth daily.  spironolactone (ALDACTONE) 100 MG tablet,         ampicillin-sulbactam (UNASYN) IV  3 g Intravenous Q6H     furosemide  40 mg Oral Daily     lisinopril  10 mg Oral Daily     MULTIVITAMIN & MINERAL   Oral Daily     phytonadione  5 mg Intravenous Once     senna-docusate  1 tablet Oral BID    Or     senna-docusate  2 tablet Oral BID     sodium chloride (PF)  3 mL Intracatheter Q8H     spironolactone  100 mg Oral Daily     vitamin D3  25 mcg Oral Daily            Review of Systems:   The 10 point review of systems is negative other than noted in the HPI.          Physical Exam:   /64 (BP Location: Right arm)   Pulse 103   Temp 96.9  F (36.1  C) (Oral)   Resp 16   Wt 81.5 kg (179 lb 9.6 oz)   LMP 05/31/2006   SpO2 96%   BMI 28.99 kg/m    General - Well developed, well nourished female in no apparent distress  HEENT:  Head normocephalic and atraumatic, pupils equal and round, conjunctivae clear, no scleral icterus, mucous membranes moist, external ears and nose  normal  Neck: Supple without thyromegaly or masses  Lymphatic: No cervical, or supraclavicular lymphadenopathy  Lungs: Clear to auscultation bilaterally  Heart: regular rate and rhythm, no murmurs  Abdomen:   Soft, rounded, mildly distended with no tenderness noted on palpation. Negative delgado's sign. No caput medusa.   Extremities: Warm without edema  Neurologic: nonfocal  Psychiatric: Mood and affect appropriate  Skin: Without lesions, rashes, or juandice         Data:     WBC -   Lab Results   Component Value Date    WBC 4.0 10/21/2019       HgB -   Lab Results   Component Value Date    HGB 13.2 10/21/2019       Plt-   Lab Results   Component Value Date     (L) 10/21/2019       Liver Function Studies -   Recent Labs   Lab Test 10/21/19  0211   PROTTOTAL 9.4*   ALBUMIN 2.2*   BILITOTAL 2.7*   ALKPHOS 153*   AST 98*   ALT 47       Lipase-   Lab Results   Component Value Date    LIPASE 554 (H) 10/21/2019         Imaging:  All imaging studies reviewed by me.    Results for orders placed or performed during the hospital encounter of 10/21/19   US Abdomen Limited    Narrative    EXAM: ULTRASOUND ABDOMEN LIMITED RIGHT UPPER QUADRANT  LOCATION: [facility]  DATE/TIME: 10/21/2019 2:50 AM    INDICATION: Right upper quadrant pain.  COMPARISON: None.  TECHNIQUE: Limited abdominal ultrasound.    FINDINGS:    GALLBLADDER: Several small gallstones are present in a mildly distended gallbladder. No convincing gallbladder wall thickening. The ultrasound technologist reports that the patient has focal tenderness over the gallbladder.    BILE DUCTS: No intrahepatic biliary dilatation. The common duct is borderline dilated, measuring 0.7 cm in diameter.    LIVER: Mildly nodular outer contour of the liver. No focal mass.    RIGHT KIDNEY: 10.1 cm in length. No right intrarenal collecting system dilatation, calculi or masses.    A small amount of free fluid in the upper right hemiabdomen.      Impression     IMPRESSION:  1. Several small gallstones are present within a mildly distended gallbladder. Additionally, the ultrasound technologist reports the patient has focal tenderness over the gallbladder. These findings are not diagnostic of acute cholecystitis, but it is   possible. If clinically relevant, HIDA scan could be considered for further evaluation.  2. Borderline dilatation of the common duct.  3. Cirrhotic-appearing liver.  4. A small amount of free fluid in the upper right hemiabdomen.       This note was created using voice recognition software. Undetected word substitutions or other errors may have occurred.     Time spent with the patient, reviewing the EMR, reviewing laboratory and imaging studies, more than 50% of which was counseling and coordinating care:  25 minutes.     Susanna Valverde MD

## 2019-10-21 NOTE — ED TRIAGE NOTES
Here for epigastric pain radiating up to chest and around to back. Paracentesis of 3.5L about 1.5 week ago. Took an oxycodone and tums around 11:30pm but vomited. ABCs intact.

## 2019-10-21 NOTE — CONSULTS
GASTROENTEROLOGY CONSULTATION      Abiola Matute  [address] [facility] [id number]  54 year old female     Admission Date/Time: 10/21/2019  Primary Care Provider: Philly Goncalves  Referring / Attending Physician:  Dr. Ramos     We were asked to see the patient in consultation by Dr. Ramos for evaluation of abdominal pain.        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    Patient reports she began having epigastric pain last night around 11 PM.  She describes sharp pain in her epigastrium with radiation to her right upper quadrant .  Patient reports tingling attacks which she attributes to her gallbladder.  The patient ate dinner at 5 PM last evening without any issues.  She mentions the chills but no fever.  She was nauseated and threw up on a few occasions.  No hematemesis.  She struggles with chronic loose stool but denies any melena or hematochezia.  She estimates her last drink of alcohol was approximately 5 weeks ago. No confusion per her .     Ultrasound in the emergency room showed several small gallstones within a mildly distended gallbladder, borderline dilation of the common duct, cirrhotic appearing liver, and small amount of free fluid.  Patient's LFTs are elevated to appear to be at her baseline.  Lipase is 550.  A CT scan from 6 months ago confirm cirrhotic liver with portal venous hypertension and gastroesophageal varices. Patient was to have a upper endoscopy however this was canceled due to significant ascites.    The patient is followed by [facility]. She is on Remicade for her perianal Crohn's disease.  She recently underwent a paracentesis for accumulating ascites this was negative for SBP on 10/9.  She was started on diuretics just 3 days ago.  She has an appointment next week to discuss her  Remicade level and plan for maintenance therapy.       PAST MEDICAL HISTORY:  Patient Active Problem List    Diagnosis Date Noted     Alcoholic fatty liver 05/14/2014     Priority: High     Acquired hyperbilirubinemia- ? secondary to alcohol use 05/14/2014     Priority: High     Atypical ductal hyperplasia of left breast 10/04/2010     Priority: High     Biliary colic 10/21/2019     Priority: Medium     Severe Perianal Crohn's Disease 07/23/2019     Priority: Medium     Stool incontinence 09/30/2014     Priority: Medium     CARDIOVASCULAR SCREENING; LDL GOAL LESS THAN 130 09/30/2014     Priority: Medium     Gastroenteritis 05/20/2014     Priority: Medium     Diffuse nodular cirrhosis of liver (H) 05/20/2014     Priority: Medium     Per US while in hospital 5/20/2014:   IMPRESSION:  1. A small amount of nonspecific free perihepatic fluid.  2. Unremarkable appearance of the gallbladder.  3. No biliary dilatation.  4. The outer contour of the liver appears slightly nodular. This could  relate to cirrhosis. Additionally, there is mild diffuse fatty  infiltration of the liver.          AA (alcohol abuse) - ? ongoing  05/14/2014     Priority: Medium     Per hospital d/c summary 5/2014: Alcoholic cirrhosis of liver with small ascites and ultrasound consistent with liver nodule.  She had elevated LFTs and was higher in 2013.  Her hepatitis C and B antigens are negative and her hepatitis A antigen and antibody also was negative.  The patient had a long history of alcohol use.  She drinks about 4 drinks every other day or every day and the patient was advised alcohol cessation and was explained that her liver was damaged although her LFTs are decreased from levels of 09/2013 but I think she already developed cirrhosis of the liver and without stopping alcohol she will progress to full blown cirrhosis of the liver with portal hypertension and understand the consequences and patient understood she needs to go to AA and alcohol  treatment.  The patient was placed with alcohol withdrawal protocol with Ativan started with vitamins during her admission here.  The patient understood the consequence of continued alcohol use.         Essential hypertension with goal blood pressure less than 140/90 06/18/2013     Priority: Medium     Abnormal liver enzymes- ? related to ongoing etoh use/abuse 10/12/2012     Priority: Medium     S/P total hysterectomy and bilateral salpingo-oophorectomy 09/26/2012     Priority: Medium     Claustrophobia 09/19/2011     Priority: Medium     Postmenopausal- s/p hysterectomy with BSO - not on HRT secondary to atypical ductal hyperplasia & breast pain -no paps needed 08/17/2011     Priority: Medium     Idiopathic thrombocytopenia (H) 03/01/2011     Priority: Medium     Rosacea 08/24/2010     Priority: Medium     Iron deficiency anemia, unspecified iron deficiency anemia type 05/08/2006     Priority: Medium     Problem list name updated by automated process. Provider to review       Intestinal infection due to Clostridium difficile 03/09/2006     Priority: Medium     Other isolated or specific phobias 12/06/2005     Priority: Medium     fear of flying - gets Ativan prn.        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 Provider to review and confirm.       Contact dermatitis and other eczema, due to unspecified cause 12/06/2005     Priority: Medium     perianal  - recurrent - clobetasol        Non morbid obesity due to excess calories 12/23/2003     Priority: Medium     Problem list name updated by automated process. Provider to review            ROS: A comprehensive ten point review of systems was negative aside from those in mentioned in the HPI.       MEDICATIONS:   Prior to Admission medications    Medication Sig Start Date End Date Taking? Authorizing Provider   lisinopril (PRINIVIL/ZESTRIL) 10 MG tablet Take 10 mg by mouth 3/30/19  Yes  Reported, Patient   acetaminophen (TYLENOL) 325 MG tablet Take 325-650 mg by mouth every 6 hours as needed for mild pain    Reported, Patient   Calcium Carbonate (CALCIUM 500 PO) Take 1 tablet by mouth daily     Reported, Patient   cholecalciferol (VITAMIN D) 1000 UNIT tablet Take 1 tablet by mouth daily.    Reported, Patient   furosemide (LASIX) 40 MG tablet  10/16/19   Reported, Patient   lisinopril (PRINIVIL/ZESTRIL) 10 MG tablet Take 1 tablet (10 mg) by mouth daily 9/23/19   Linda Macdonald APRN CNP   LORazepam (ATIVAN) 0.5 MG tablet Take 1 tablet by mouth. 1-2 tabs 30-60 minutes prior to exam/procedure/flight 3/7/16   Philly Goncalves MD   Multiple Vitamins-Minerals (MULTIVITAMIN & MINERAL PO) Take  by mouth daily.    Reported, Patient   spironolactone (ALDACTONE) 100 MG tablet  10/16/19   Reported, Patient        ALLERGIES:   Allergies   Allergen Reactions     Fish Oil      Redness and itching around eye area only - went away when fish oil capsules stopped      Metronidazole      pain/itching     Naphthalenemethylamines      Lamisil = mild urticarial reaction     Penicillins Cramps     Vomiting     Ppd [Tuberculin Purified Protein Derivative]      Sulfa Drugs      hives        SOCIAL HISTORY:  Social History     Tobacco Use     Smoking status: Never Smoker     Smokeless tobacco: Never Used   Substance Use Topics     Alcohol use: Yes     Alcohol/week: 0.0 standard drinks     Comment: occasional     Drug use: No     Comment: no herbal meds either        FAMILY HISTORY:  Family History   Problem Relation Age of Onset     Breast Cancer Mother      Gastrointestinal Disease Mother      Heart Disease Father 57     Heart Disease Paternal Grandfather      Hypertension Maternal Grandmother      Diabetes Paternal Grandmother      Diabetes Maternal Grandfather      Cancer Sister         PHYSICAL EXAM:     /64 (BP Location: Right arm)   Pulse 103   Temp 96.9  F (36.1  C) (Oral)   Resp 16   Wt 81.5 kg  (179 lb 9.6 oz)   LMP 05/31/2006   SpO2 96%   BMI 28.99 kg/m        PHYSICAL EXAM:  GENERAL:  NAD  SKIN: no suspicious lesions, rashes, jaundice  HEAD: Normocephalic. Atraumatic.  NECK: Neck supple. No adenopathy.   EYES: No scleral icterus  RESPIRATORY: Good transmission. CTA bilaterally.   CARDIOVASCULAR: RRR, normal S1, S2,  No murmur appreciated  GASTROINTESTINAL: +BS, soft, large, + epigastric tenderness, non distended, no guarding/rebound  JOINT/EXTREMITIES:  no gross deformities noted, trace LE edema.  NEURO: CN 2-12 grossly intact, no focal deficits  PSYCH: Normal affect              ADDITIONAL COMMENTS:   I reviewed the patient's new clinical lab test results.   Recent Labs   Lab Test 10/21/19  0211 10/09/19  0830 04/03/19  1300 03/25/19  1013 03/08/19   WBC 4.0  --  5.1 4.2  --    HGB 13.2  --  13.2 13.9  --    *  --  104* 103*  --    * 90* 121* Results confirmed by repeat test  --    INR 1.50* 1.71*  --   --  1.3*     Recent Labs   Lab Test 10/21/19  0211 09/23/19  1245 04/03/19  1300   POTASSIUM 3.4 4.6 4.0   CHLORIDE 103 106 107   CO2 21 23 23   BUN 6* 5* 8   ANIONGAP 8 7 8     Recent Labs   Lab Test 10/21/19  0242 10/21/19  0211 09/23/19  1245 03/25/19  1013  02/26/18  1556  05/20/14  0032  09/26/12  0907   ALBUMIN  --  2.2* 2.3* 3.1*   < >  --    < > 4.9   < >  --    BILITOTAL  --  2.7* 3.4* 1.3   < >  --    < > 2.1*   < >  --    ALT  --  47 54* 59*   < >  --    < > 58*   < >  --    AST  --  98* 119* 118*   < >  --    < > 106*   < >  --    PROTEIN 30*  --   --   --   --  >=300*  --   --   --  Negative   LIPASE  --  554*  --   --   --   --   --  414*  --   --     < > = values in this interval not displayed.        IMAGING / ENDOSCOPY    ULTRASOUND ABDOMEN LIMITED RIGHT UPPER QUADRANT  DATE/TIME: 10/21/2019 2:50 AM     INDICATION: Right upper quadrant pain.  COMPARISON: None.  TECHNIQUE: Limited abdominal ultrasound.     FINDINGS:     GALLBLADDER: Several small gallstones are present in  a mildly distended gallbladder. No convincing gallbladder wall thickening. The ultrasound technologist reports that the patient has focal tenderness over the gallbladder.     BILE DUCTS: No intrahepatic biliary dilatation. The common duct is borderline dilated, measuring 0.7 cm in diameter.     LIVER: Mildly nodular outer contour of the liver. No focal mass.     RIGHT KIDNEY: 10.1 cm in length. No right intrarenal collecting system dilatation, calculi or masses.     A small amount of free fluid in the upper right hemiabdomen.                                                                 IMPRESSION:  1. Several small gallstones are present within a mildly distended gallbladder. Additionally, the ultrasound technologist reports the patient has focal tenderness over the gallbladder. These findings are not diagnostic of acute cholecystitis, but it is   possible. If clinically relevant, HIDA scan could be considered for further evaluation.  2. Borderline dilatation of the common duct.  3. Cirrhotic-appearing liver.  4. A small amount of free fluid in the upper right hemiabdomen.       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    1.  Abdominal pain: Patient is experiencing epigastric pain with radiation to her right upper quadrant.  Possible acute cholecystitis, biliary dyskinesia alcoholic pancreatitis , gallstone pancreatitis,  gastritis/esophagitis, GERD, less likely choledocholithiasis (LFTs are at baseline)  or SBP.  Her Crohn's disease is perianal and not likely playing a role in upper abdominal pain.  -- General surgery with plans for HIDA scan  -- Trand LFTs  -- N.p.o. until surgical decision made  -- Can consider CT of her abdomen and pelvis if no improvement in her abdominal pain and if normal HIDA scan.    2. Cirrhosis, alcohol related:  Complicated by portal hypertension, coagulopathy, and ascites.  No evidence of GI bleeding or change in mental status.  Only small amount of ascites seen on ultrasound.  She was started on diuretics just last week.  Normal kidney function today. She has been sober for 5 weeks.  Appearing compensated.    --Liver biopsy from 4/24 2019 showing moderate steatosis, and cirrhosis. No autoimmune hepatitis or other liver disease.     3.  Perianal Crohn's disease: Patient is on outpatient Remicade.  She struggles with chronic diarrhea does have history of C. difficile.  She has an appointment next week to discuss drug levels and ongoing maintenance therapy.      I discussed the patient plan with Dr. Bhakta. Thank you for asking us to participate in the care of this patient.    Martha Mak PA-C  [facility] ( [facility])     -------------  Came by to see the patient but she was off the floor having her HIDA scan.  Did not see her today, but I agree with Martha above.      Jacqueline Bhakta MD  [facility]

## 2019-10-21 NOTE — H&P
Admitted:     10/21/2019      PRIMARY CARE PHYSICIAN:  Philly Goncalves MD      PRIMARY GASTROENTEROLOGIST:  Amilcar Kellogg.      CHIEF COMPLAINT:  Abdominal pain.      HISTORY OF PRESENT ILLNESS:  Abiola Matute is a 54-year-old  female with a history of alcohol-related cirrhosis, as well as numerous other medical history including Crohn's disease, irritable bowel syndrome, C. diff and other medical history, who presents to [facility] with abdominal pain.      The patient had a small amount of meatballs around 5:00 and around 10:45, started having epigastric discomfort.  She felt bloated.  The pain radiated from her chest around to her back.  She recently underwent her first paracentesis about a week and half ago, when they took about 3-1/2 liters off.  Since her abdominal pain, she has had 3 episodes of emesis.  She denies any black or bloody stools.  She has had prior episodes of similar pain in the past.  The patient tried to take Tums, which did not help.  She woke up her  who brought her to [facility] for further assessment.      In the Emergency Department, the patient was seen by Dr. Guillory.  The patient was given a GI cocktail and Tums, which did not help her pain.  The patient was afebrile.  Vital signs were stable.  Blood work revealed a sodium of 132, potassium 3.4, normal kidney function.  LFTs are abnormal with an albumin of 2.2, total protein of 9.4.  Bilirubin is elevated at 2.7, alkaline phosphatase is 153.  ALT is normal at 47.  AST is elevated at 98.  Lipase is 554 and above baseline.  Troponin is negative.  Glucose 118.  White count 4, hemoglobin 13.2, platelets of 116,000 with an MCV of 107.  INR is 1.5.  Urinalysis had specific gravity of 1.021 with 30 protein, moderate blood, moderate leukocyte esterase, 8 white cells and 2 red cells.  Alcohol was negative.  Ultrasound revealed several small gallstones with a mildly distended gallbladder.   Additionally, the technologist reported focal tenderness over the gallbladder, borderline dilatation of the common duct and cirrhotic-appearing liver.  A small amount of free fluid in the right upper marc-abdomen was noted.  A 12-lead EKG revealed a normal sinus rhythm without any ischemic changes.  QT is prolonged at 488 milliseconds.  The patient received IV Pepcid, as well as normal saline and 3 grams of Unasyn is being given for possible biliary colic.  The patient also received 3 doses of IV Dilaudid for pain control.      PAST MEDICAL HISTORY:   1.  Alcohol abuse.  The patient has not had any recent alcohol, according to her.   2.  History of alcohol liver disease with abnormal LFTs, as well as liver-related coagulopathy.   3.  Pancytopenia due to liver disease.   4.  Cirrhotic liver as a result of alcohol.   5.  Atypical ductal hyperplasia, left breast.   6.  Bifid uvula.   7.  Claustrophobia.   8.  Contact dermatitis.   9.  Hypertension.     10.  Gastroenteritis.   11.  Hypertension.   12.  Irritable bowel syndrome.   13.  History of ITP.   14.  History of Clostridium difficile.   15.  Iron deficiency anemia.   16.  Rosacea.   17.  History of severe perianal Crohn's disease.     18.  History of stool incontinence.      PAST SURGICAL HISTORY:   1.  Total hysterectomy and bilateral salpingo-oophorectomy.   2.  History of anal biopsy.   3.  Breast biopsy.     4.  Appendectomy.     5.  Ankle fracture repair.     6.  Hysterectomy.     7.  Right pelviscopy.      FAMILY HISTORY:  Positive for breast cancer, heart disease, diabetes, and cancer.      SOCIAL HISTORY:  .  Currently not employed.  No tobacco.  No alcohol.      ALLERGIES:  FISH OIL, METRONIDAZOLE, METHYLAMINES, PENICILLIN, PPD AND SULFA DRUGS.      CURRENT MEDICATIONS:   1.  Tylenol.   2.  Calcium with vitamin D.   3.  Lasix.     4.  Lisinopril   5.  Ativan.     6.  Multivitamin.     7.  Spironolactone.      REVIEW OF SYSTEMS:  A 10 point system  was reviewed.  The patient denies any fevers or chills or sweats, but positive for abdominal pain in the epigastric and mid back.  Multiple episodes of vomiting and loose stools.  No black or bloody stools.  Weight has been stable.  Some increased abdominal girth.  Otherwise, 10-point systems reviewed and otherwise unremarkable.  The patient specifically denies any confusion.      PHYSICAL EXAMINATION:   VITAL SIGNS:  Temperature 97.6, heart rate 85, respiration 18, blood pressure 120/72, sats 92% on room air.   GENERAL:  The patient is a 54-year-old  female.  She is oriented x 3.   HEENT:  Pupils equal.  Sclerae are anicteric.  Oropharynx without lesions.  Mucous membranes are dry.   NECK:  Veins are not elevated.   CHEST:  Clear to auscultation, diminished at the bases.   CARDIOVASCULAR:  S1, S2, regular rate and rhythm.   GASTROINTESTINAL:  Abdomen is slightly distended.  She has some tenderness in the epigastric region and right upper quadrant.  Bowel sounds are normoactive.   MUSCULOSKELETAL:  No edema.   NEUROLOGIC:  She is able to move all 4 extremities.  Cranial nerves grossly intact.   DERMATOLOGIC:  Skin is warm and dry, well perfused.   PSYCHIATRIC:  Mood and affect are stable.      LABORATORY DATA:  As stated in the history of present illness.        ASSESSMENT:  Virginia Matute is 54 with alcohol liver disease, Crohn's disease, alcohol-related cirrhosis, as well as complications of ascites, coagulopathy and thrombocytopenia, who presents to [facility] with epigastric pain and symptoms consistent with biliary colic.      PLAN:   1.  Abdominal pain, suspect biliary colic.  The patient will be treated conservatively.  The patient will be given Unasyn, IV fluids and bowel rest.  The patient will receive IV Dilaudid for pain and antiemetics.  We will obtain a General Surgery consultation as well.  We will defer to them with respect to conservative treatment versus laparoscopic-assisted  cholecystectomy.   2.  Alcoholic liver disease with associated coagulopathy.  The patient will receive vitamin K 5 mg in anticipation for possible surgery.  With respect to her alcohol liver disease, her LFTs are diffusely elevated and are likely more a result of her chronic liver disease than her acute biliary colic.  We will obtain a Gastroenterology consultation.   3.  Hypertension.  We will continue the patient on lisinopril.   4.  Mild hyponatremia.  The patient will be given lactated Ringer's.   5.  Abnormal LFTs.  Suspect this is likely due to her chronic underlying liver disease.   6.  Elevated lipase.  This is likely also in the setting of her biliary issues rather than pancreatitis.   7.  Deep venous thrombosis prophylaxis.  The patient will receive compression boots.      CODE STATUS:  Full.         LUIS F FERREIRA MD             D: 10/21/2019   T: 10/21/2019   MT: ELMO      Name:     JOAN MCNAMARA   MRN:      [id number]        Account:      [id number]   :      1965        Admitted:     10/21/2019                   Document: [id number]       cc: Philly Kellogg DO

## 2019-10-22 ENCOUNTER — HOSPITAL ENCOUNTER (INPATIENT)
Facility: CLINIC | Age: 54
LOS: 1 days | Discharge: HOME | DRG: 445 | End: 2019-10-23
Attending: INTERNAL MEDICINE | Admitting: INTERNAL MEDICINE
Payer: COMMERCIAL

## 2019-10-22 VITALS
RESPIRATION RATE: 20 BRPM | WEIGHT: 181.4 LBS | HEART RATE: 103 BPM | OXYGEN SATURATION: 95 % | DIASTOLIC BLOOD PRESSURE: 65 MMHG | SYSTOLIC BLOOD PRESSURE: 109 MMHG | BODY MASS INDEX: 29.28 KG/M2 | TEMPERATURE: 98.1 F

## 2019-10-22 DIAGNOSIS — K81.9 CHOLECYSTITIS: Primary | ICD-10-CM

## 2019-10-22 PROBLEM — K70.31 ALCOHOLIC CIRRHOSIS OF LIVER WITH ASCITES (H): Status: ACTIVE | Noted: 2019-10-22

## 2019-10-22 LAB
ALBUMIN SERPL-MCNC: 1.7 G/DL (ref 3.4–5)
ALP SERPL-CCNC: 104 U/L (ref 40–150)
ALT SERPL W P-5'-P-CCNC: 33 U/L (ref 0–50)
ANION GAP SERPL CALCULATED.3IONS-SCNC: 3 MMOL/L (ref 3–14)
AST SERPL W P-5'-P-CCNC: 70 U/L (ref 0–45)
BILIRUB DIRECT SERPL-MCNC: 1.1 MG/DL (ref 0–0.2)
BILIRUB SERPL-MCNC: 2.1 MG/DL (ref 0.2–1.3)
BUN SERPL-MCNC: 7 MG/DL (ref 7–30)
CALCIUM SERPL-MCNC: 7.5 MG/DL (ref 8.5–10.1)
CHLORIDE SERPL-SCNC: 109 MMOL/L (ref 94–109)
CO2 SERPL-SCNC: 24 MMOL/L (ref 20–32)
CREAT SERPL-MCNC: 0.51 MG/DL (ref 0.52–1.04)
ERYTHROCYTE [DISTWIDTH] IN BLOOD BY AUTOMATED COUNT: 14.4 % (ref 10–15)
GFR SERPL CREATININE-BSD FRML MDRD: >90 ML/MIN/{1.73_M2}
GLUCOSE SERPL-MCNC: 73 MG/DL (ref 70–99)
HCT VFR BLD AUTO: 33.4 % (ref 35–47)
HGB BLD-MCNC: 11.2 G/DL (ref 11.7–15.7)
MCH RBC QN AUTO: 36.7 PG (ref 26.5–33)
MCHC RBC AUTO-ENTMCNC: 33.5 G/DL (ref 31.5–36.5)
MCV RBC AUTO: 110 FL (ref 78–100)
PLATELET # BLD AUTO: 79 10E9/L (ref 150–450)
POTASSIUM SERPL-SCNC: 3.7 MMOL/L (ref 3.4–5.3)
PROT SERPL-MCNC: 7.4 G/DL (ref 6.8–8.8)
RBC # BLD AUTO: 3.05 10E12/L (ref 3.8–5.2)
SODIUM SERPL-SCNC: 136 MMOL/L (ref 133–144)
WBC # BLD AUTO: 2.6 10E9/L (ref 4–11)

## 2019-10-22 PROCEDURE — 25000128 H RX IP 250 OP 636: Performed by: INTERNAL MEDICINE

## 2019-10-22 PROCEDURE — 99223 1ST HOSP IP/OBS HIGH 75: CPT | Mod: AI | Performed by: INTERNAL MEDICINE

## 2019-10-22 PROCEDURE — 25800030 ZZH RX IP 258 OP 636: Performed by: INTERNAL MEDICINE

## 2019-10-22 PROCEDURE — 12000012 ZZH R&B MS OVERFLOW UMMC

## 2019-10-22 PROCEDURE — 85027 COMPLETE CBC AUTOMATED: CPT | Performed by: INTERNAL MEDICINE

## 2019-10-22 PROCEDURE — 99239 HOSP IP/OBS DSCHRG MGMT >30: CPT | Performed by: INTERNAL MEDICINE

## 2019-10-22 PROCEDURE — 80076 HEPATIC FUNCTION PANEL: CPT | Performed by: INTERNAL MEDICINE

## 2019-10-22 PROCEDURE — 80048 BASIC METABOLIC PNL TOTAL CA: CPT | Performed by: INTERNAL MEDICINE

## 2019-10-22 PROCEDURE — 36415 COLL VENOUS BLD VENIPUNCTURE: CPT | Performed by: INTERNAL MEDICINE

## 2019-10-22 PROCEDURE — 25000132 ZZH RX MED GY IP 250 OP 250 PS 637: Performed by: INTERNAL MEDICINE

## 2019-10-22 RX ORDER — SODIUM CHLORIDE 9 MG/ML
INJECTION, SOLUTION INTRAVENOUS CONTINUOUS
Status: ACTIVE | OUTPATIENT
Start: 2019-10-22 | End: 2019-10-23

## 2019-10-22 RX ORDER — SPIRONOLACTONE 50 MG/1
100 TABLET, FILM COATED ORAL DAILY
Status: DISCONTINUED | OUTPATIENT
Start: 2019-10-23 | End: 2019-10-23 | Stop reason: HOSPADM

## 2019-10-22 RX ORDER — LIDOCAINE 40 MG/G
CREAM TOPICAL
Status: DISCONTINUED | OUTPATIENT
Start: 2019-10-22 | End: 2019-10-23 | Stop reason: HOSPADM

## 2019-10-22 RX ORDER — FUROSEMIDE 40 MG
40 TABLET ORAL DAILY
Status: DISCONTINUED | OUTPATIENT
Start: 2019-10-23 | End: 2019-10-23 | Stop reason: HOSPADM

## 2019-10-22 RX ORDER — NALOXONE HYDROCHLORIDE 0.4 MG/ML
.1-.4 INJECTION, SOLUTION INTRAMUSCULAR; INTRAVENOUS; SUBCUTANEOUS
Status: DISCONTINUED | OUTPATIENT
Start: 2019-10-22 | End: 2019-10-23 | Stop reason: HOSPADM

## 2019-10-22 RX ORDER — AMPICILLIN AND SULBACTAM 2; 1 G/1; G/1
3 INJECTION, POWDER, FOR SOLUTION INTRAMUSCULAR; INTRAVENOUS EVERY 6 HOURS
Status: DISCONTINUED | OUTPATIENT
Start: 2019-10-23 | End: 2019-10-23 | Stop reason: HOSPADM

## 2019-10-22 RX ADMIN — AMPICILLIN SODIUM AND SULBACTAM SODIUM 3 G: 2; 1 INJECTION, POWDER, FOR SOLUTION INTRAMUSCULAR; INTRAVENOUS at 06:47

## 2019-10-22 RX ADMIN — SODIUM CHLORIDE, POTASSIUM CHLORIDE, SODIUM LACTATE AND CALCIUM CHLORIDE: 600; 310; 30; 20 INJECTION, SOLUTION INTRAVENOUS at 10:38

## 2019-10-22 RX ADMIN — AMPICILLIN SODIUM AND SULBACTAM SODIUM 3 G: 2; 1 INJECTION, POWDER, FOR SOLUTION INTRAMUSCULAR; INTRAVENOUS at 13:58

## 2019-10-22 RX ADMIN — AMPICILLIN SODIUM AND SULBACTAM SODIUM 3 G: 2; 1 INJECTION, POWDER, FOR SOLUTION INTRAMUSCULAR; INTRAVENOUS at 01:32

## 2019-10-22 RX ADMIN — SPIRONOLACTONE 100 MG: 100 TABLET, FILM COATED ORAL at 08:09

## 2019-10-22 RX ADMIN — SODIUM CHLORIDE: 9 INJECTION, SOLUTION INTRAVENOUS at 21:51

## 2019-10-22 RX ADMIN — HYDROMORPHONE HYDROCHLORIDE 0.5 MG: 1 INJECTION, SOLUTION INTRAMUSCULAR; INTRAVENOUS; SUBCUTANEOUS at 19:12

## 2019-10-22 RX ADMIN — FUROSEMIDE 40 MG: 40 TABLET ORAL at 08:09

## 2019-10-22 RX ADMIN — AMPICILLIN SODIUM AND SULBACTAM SODIUM 3 G: 2; 1 INJECTION, POWDER, FOR SOLUTION INTRAMUSCULAR; INTRAVENOUS at 19:12

## 2019-10-22 RX ADMIN — LISINOPRIL 10 MG: 10 TABLET ORAL at 08:09

## 2019-10-22 ASSESSMENT — ACTIVITIES OF DAILY LIVING (ADL)
ADLS_ACUITY_SCORE: 11
COGNITION: 0 - NO COGNITION ISSUES REPORTED
TRANSFERRING: 0-->INDEPENDENT
ADLS_ACUITY_SCORE: 12
TOILETING: 0-->INDEPENDENT
RETIRED_COMMUNICATION: 0-->UNDERSTANDS/COMMUNICATES WITHOUT DIFFICULTY
RETIRED_EATING: 0-->INDEPENDENT
AMBULATION: 0-->INDEPENDENT
BATHING: 0-->INDEPENDENT
DRESS: 0-->INDEPENDENT
ADLS_ACUITY_SCORE: 12
TOILETING: 0-->INDEPENDENT
SWALLOWING: 0-->SWALLOWS FOODS/LIQUIDS WITHOUT DIFFICULTY
FALL_HISTORY_WITHIN_LAST_SIX_MONTHS: NO
RETIRED_EATING: 0-->INDEPENDENT
AMBULATION: 0-->INDEPENDENT
RETIRED_COMMUNICATION: 0-->UNDERSTANDS/COMMUNICATES WITHOUT DIFFICULTY
FALL_HISTORY_WITHIN_LAST_SIX_MONTHS: NO
TRANSFERRING: 0-->INDEPENDENT
ADLS_ACUITY_SCORE: 11
BATHING: 0-->INDEPENDENT
SWALLOWING: 0-->SWALLOWS FOODS/LIQUIDS WITHOUT DIFFICULTY
COGNITION: 0 - NO COGNITION ISSUES REPORTED
ADLS_ACUITY_SCORE: 12
DRESS: 0-->INDEPENDENT

## 2019-10-22 NOTE — PLAN OF CARE
[id number]:   VS: Stable. Afebrile. Pain minimal, no PRNs given.   GI: BS active, passing flatus. Denies nausea. Remains NPO.   LS: Clear. Denies shortness of breath.   : Voiding without issue.   Neuro: Alert and oriented x4. CMS intact.   Mobility: Up independently.   Disposition: Transfer to [facility] this evening.

## 2019-10-22 NOTE — PLAN OF CARE
A/Ox4, SBA for all transfers, VSS, NPO except meds and ice chips, LS clear/diminished 95% on RA, Q6H Unasyn, PIV infusing LR at 100ml/hr, denies pain - has PRN dilaudid if needed, HIDA completed yesterday showing acute cholecystitis - needs to transfer to [facility] today because of underlying alcohol related cirrhosis and portal hypertension which makes her a high bleeding risk, continue with plan of care.

## 2019-10-22 NOTE — PROGRESS NOTES
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  Ultrasound in the ER showed several small gallstones with a mildly distended gallbladder and borderline dilatation of the common duct.  There is some question as to the cause of her epigastric pain and ultimately she underwent HIDA scan was nonfilling/suggestive of cholecystitis.  General surgery is recommending transfer to the [facility] for cholecystectomy given comorbidities including liver disease.  The patient herself is feeling reasonably well with improvement in her pain.    Problem List/Assessment and Plan:   1. Suspected acute cholecystitis: As above.  Pain overall improved.    --Remains on IV Unasyn.    --General surgery following, recommended transfer to [facility] following nonfilling HIDA scan.    --Patient placement has been contacted and we are awaiting notification of bed availability though this may take a day or 2.    2.   History of cirrhosis: Reportedly this is due to alcohol.  Chronically is on Lasix, Aldactone.  I note that both of these were continued in the setting of IV fluid administration.  We will hold off for now while n.p.o.  Per Dr. Bhakta the patient actually underwent a liver biopsy in April 2019 showing cirrhosis with moderate steatosis but no autoimmune hepatitis or other liver disease.    3.   History of hypertension: Remains on lisinopril.    4.   History of Crohn's disease: Follows with [facility].  Managed with Remicade.  Has chronic diarrhea.  Per [facility] she actually has an appointment next week in clinic.    5.   Chronically elevated LFTs.:  Appear to be essentially on  baseline.  Overall trended down after admission with IV hydration.  Will monitor periodically.    6.  Pancytopenia: Suspect large part related to liver disease.  Has macrocytosis.  Have seen all cell lines trend down in the setting of IV fluid administration, would continue to monitor.    DVT Prophylaxis: Pneumatic Compression Devices  Code Status: Full Code  Discharge Dispo/Date: Anticipate transfer to the [facility] in the next day or 2 for cholecystectomy.        Interval History (Subjective):      I assumed care today, the patient was admitted yesterday  HIDA scan non-filling, as per General surgery plan to transfer to [facility]  Overall pain is minimal today but still present in the epigastric region  Remains n.p.o.  Working on transfer to the [facility], she has been accepted by Dr. Garcia from the surgical team though I note the recommendation was admit to the hospitalist service.  This was discussed with the on-call hospitalist by my colleague last night.                  Physical Exam:      Last Vital Signs:  /71 (BP Location: Right arm)   Pulse 103   Temp 97.6  F (36.4  C) (Oral)   Resp 16   Wt 82.3 kg (181 lb 6.4 oz)   LMP 05/31/2006   SpO2 95%   BMI 29.28 kg/m        Intake/Output Summary (Last 24 hours) at 10/22/2019 1224  Last data filed at 10/22/2019 1039  Gross per 24 hour   Intake 2544 ml   Output --   Net 2544 ml       General: Alert, awake, no acute distress.  HEENT: NC/AT, eyes anicteric, external occular movements intact, face symmetric.  Dentition WNL, MM moist.  Cardiac: RRR, S1, S2.  No murmurs appreciated.  Pulmonary: Normal chest rise, normal work of breathing.  Lungs CTA BL  Abdomen: Epigastrium tender to palpation, otherwise soft, non-tender, non-distended.  Bowel Sounds Present.  No guarding.  Extremities: no deformities.  Warm, well perfused.  Skin: no rashes or lesions noted.  Warm and Dry.  Neuro: No focal deficits noted.  Speech clear.  Coordination  and strength grossly normal.  Psych: Appropriate affect.         Medications:      All current medications were reviewed with changes reflected in problem list.         Data:      All new lab and imaging data was reviewed.   Labs:  Recent Labs   Lab 10/22/19  0821      POTASSIUM 3.7   CHLORIDE 109   CO2 24   ANIONGAP 3   GLC 73   BUN 7   CR 0.51*   GFRESTIMATED >90   GFRESTBLACK >90   ARIEL 7.5*     Recent Labs   Lab 10/22/19  0821   WBC 2.6*   HGB 11.2*   HCT 33.4*   *   PLT 79*     Recent Labs   Lab 10/22/19  0821 10/21/19  0211   AST 70* 98*   ALT 33 47   ALKPHOS 104 153*   BILITOTAL 2.1* 2.7*     Recent Labs   Lab 10/21/19  0211   INR 1.50*      Imaging:   Recent Results (from the past 48 hour(s))   US Abdomen Limited    Narrative    EXAM: ULTRASOUND ABDOMEN LIMITED RIGHT UPPER QUADRANT  LOCATION: [facility]  DATE/TIME: 10/21/2019 2:50 AM    INDICATION: Right upper quadrant pain.  COMPARISON: None.  TECHNIQUE: Limited abdominal ultrasound.    FINDINGS:    GALLBLADDER: Several small gallstones are present in a mildly distended gallbladder. No convincing gallbladder wall thickening. The ultrasound technologist reports that the patient has focal tenderness over the gallbladder.    BILE DUCTS: No intrahepatic biliary dilatation. The common duct is borderline dilated, measuring 0.7 cm in diameter.    LIVER: Mildly nodular outer contour of the liver. No focal mass.    RIGHT KIDNEY: 10.1 cm in length. No right intrarenal collecting system dilatation, calculi or masses.    A small amount of free fluid in the upper right hemiabdomen.      Impression    IMPRESSION:  1. Several small gallstones are present within a mildly distended gallbladder. Additionally, the ultrasound technologist reports the patient has focal tenderness over the gallbladder. These findings are not diagnostic of acute cholecystitis, but it is   possible. If clinically relevant, HIDA scan could be considered for further  evaluation.  2. Borderline dilatation of the common duct.  3. Cirrhotic-appearing liver.  4. A small amount of free fluid in the upper right hemiabdomen.   NM HepatOBiliary Scan    Narrative    NM HEPATOBILIARY SCAN    10/21/2019 4:29 PM.    INDICATION:   RUQ pain, no fever, no elevated WBC, US shows gallstones  only.    COMPARISON: Abdominal ultrasound on same day earlier.    TECHNIQUE:  The patient received 6.0 mCi 99mTc-MEBROFENIN  intravenously. Images were obtained out through 60 minutes.     FINDINGS:  There is prompt clearance of the radionuclide from the  blood pool into the liver. By 10 minutes there is clear visualization  of the intrahepatic ducts as well as the upper common bile duct. At  15-20 minutes there is emptying from the common bile duct into the  small bowel. The gallbladder is not visualized after 60 minutes of  scanning.    Enterogastric reflux is present.      Impression    IMPRESSION:   1. No visualization of the gallbladder after 60 minutes of scanning.  This could be due to cystic duct obstruction which can be seen with  acute cholecystitis.  2. Mild enterogastric reflux is present.    MD Horacio BETANCOURT MD , MD.

## 2019-10-22 NOTE — PROGRESS NOTES
GASTROENTEROLOGY PROGRESS NOTE        SUBJECTIVE:  Minimal epigastric TTP, no nausea or vomiting, no fever.      OBJECTIVE:    /71 (BP Location: Right arm)   Pulse 103   Temp 97.6  F (36.4  C) (Oral)   Resp 16   Wt 82.3 kg (181 lb 6.4 oz)   LMP 2006   SpO2 95%   BMI 29.28 kg/m    Temp (24hrs), Av.2  F (36.8  C), Min:97.6  F (36.4  C), Max:98.9  F (37.2  C)    Patient Vitals for the past 72 hrs:   Weight   10/22/19 0659 82.3 kg (181 lb 6.4 oz)   10/21/19 0630 81.5 kg (179 lb 9.6 oz)       Intake/Output Summary (Last 24 hours) at 10/22/2019 0855  Last data filed at 10/21/2019 1851  Gross per 24 hour   Intake 1013 ml   Output --   Net 1013 ml        PHYSICAL EXAM     Constitutional: NAD  Abdomen: soft, + epigastric TTP     Additional Comments:  ROS, FH, SH: See initial GI consult for details.     I have reviewed the patient's new clinical lab results:     Recent Labs   Lab Test 10/21/19  0211 10/09/19  0830 19  1300 19  1013 19   WBC 4.0  --  5.1 4.2  --    HGB 13.2  --  13.2 13.9  --    *  --  104* 103*  --    * 90* 121* Results confirmed by repeat test  --    INR 1.50* 1.71*  --   --  1.3*     Recent Labs   Lab Test 10/21/19  0211 09/23/19  1245 19  1300   POTASSIUM 3.4 4.6 4.0   CHLORIDE 103 106 107   CO2  23   BUN 6* 5* 8   ANIONGAP 8 7 8     Recent Labs   Lab Test 10/21/19  0242 10/21/19  0211 09/23/19  1245 19  1013  18  1556  14  0032  12  0907   ALBUMIN  --  2.2* 2.3* 3.1*   < >  --    < > 4.9   < >  --    BILITOTAL  --  2.7* 3.4* 1.3   < >  --    < > 2.1*   < >  --    ALT  --  47 54* 59*   < >  --    < > 58*   < >  --    AST  --  98* 119* 118*   < >  --    < > 106*   < >  --    PROTEIN 30*  --   --   --   --  >=300*  --   --   --  Negative   LIPASE  --  554*  --   --   --   --   --  414*  --   --     < > = values in this interval not displayed.     IMAGING    NM HEPATOBILIARY SCAN    10/21/2019 4:29 PM.     INDICATION:    RUQ pain, no fever, no elevated WBC, US shows gallstones  only.  COMPARISON: Abdominal ultrasound on same day earlier.  FINDINGS:  There is prompt clearance of the radionuclide from the  blood pool into the liver. By 10 minutes there is clear visualization  of the intrahepatic ducts as well as the upper common bile duct. At  15-20 minutes there is emptying from the common bile duct into the  small bowel. The gallbladder is not visualized after 60 minutes of  scanning.  Enterogastric reflux is present.                                                                    IMPRESSION:   1. No visualization of the gallbladder after 60 minutes of scanning.  This could be due to cystic duct obstruction which can be seen with  acute cholecystitis.  2. Mild enterogastric reflux is present.    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     1.  Abdominal pain: HIDA scan demonstrating non-filling gallbladder consistent with acute cholecystitis. Given her co morbid cirrhosis with portal HTN plans are in place for transfer to the [facility] for potential cholecystectomy.     2. Cirrhosis, alcohol related: Complicated by portal hypertension, coagulopathy, and ascites.  No evidence of GI bleeding or change in mental status.  Only small amount of ascites seen on ultrasound.  She was started on diuretics just last week.  Normal kidney function today. She has been sober for 5 weeks.  Appearing compensated.     --Liver biopsy from 4/24 2019 showing moderate steatosis, and cirrhosis. No autoimmune hepatitis or other liver disease.  -- MELD 19 (10/21)      3.  Perianal Crohn's disease: Patient is on outpatient Remicade.  She struggles with chronic diarrhea does have history of C. difficile.  She has an appointment next week to discuss drug levels and ongoing maintenance  therapy.        Discussed with Dr. Yony Mak PA-C  [facility] ( [facility])         -----------------------  I saw the patient this afternoon.  She will be transferring to the [facility] given a positive HIDA scan.  She continues to have midepigastric abdominal pain. ON exam she is warm to touch and her abd appears distended.    She is followed by Dr. Kellogg in our IBD clinic for perianal crohn's disease.  She also has cirrhosis secondary to alcohol use.  Dr. Kellogg had discussed with her transferring her IBD and hepatology care to the [facility] given that they have liver transplant services in case this would be needed in the future.  It has been difficult to treat her crohn's disease with remicade, and he is also worried about continuing the remicade since past studies on some anti-TNFs had shown that in patient's with alcoholic liver disease these types of medications could cause decompensated liver disease.      On transfer paperwork, please recommend that GI and hepatology see the patient while inpatient to help establish care.    Signing off, but please call with questions or concerns.    Nice Valarie Bhakta MD  [facility]

## 2019-10-22 NOTE — PROGRESS NOTES
I discussed with Dr Valverde who recommends transfer to [facility].  I called [facility] and they do not have any beds available but thought she could potentially be transferred tomorrow.   I discussed with the Hospitalist and Dr Garcia from general surgery at [facility].  They will call the 5th floor when they have a bed and she can be transferred to the Hospitalist service.

## 2019-10-22 NOTE — PROGRESS NOTES
General Surgery    HIDA shows non-filling of the gallbladder, consistent with acute cholecystitis. Given the patient's underlying cirrhosis with stigmata of portal hypertension and therefor high bleeding/complication risk, I recommend transfer to the [facility] for potential laparoscopic cholecystectomy. I don't feel that she is a good candidate for a percutaneous cholecystostomy tube given her ascites.     -Susanna Valverde MD

## 2019-10-22 NOTE — PLAN OF CARE
[id number]:  VS: Stable. Afebrile. Pain improved, IV dilaudid x2 this shift.   GI: BS active, passing flatus. Denies nausea. Remains NPO. Biliary scan completed this afternoon with nuc med.   LS: Clear. Denies shortness of breath.   : voiding without issue.   Neuro:  Alert and oriented x4. CMS intact.   Mobility: Up independently.   Disposition: TBD.

## 2019-10-22 NOTE — PROGRESS NOTES
[facility]  Internal Medicine Transfer Triage Note     Date of call: 10/21/19  Current Location: [facility]      Reason for Transfer: Surgical consult   Diagnosis: cholelithiasis      Summary   54 y/F  history of alcohol-related cirrhosis,  Crohn's disease, irritable bowel syndrome, C. Diff  transferring to [facility] for surgical eval of cholecystitis.   HIDA shows non-filling of the gallbladder, consistent with acute cholecystitis. Given the patient's underlying cirrhosis it was recommended to transfer to [facility]. General surgery  Dr Garcia  ok with admit under medicine service given medical complexity     Outside Records: available         Stability of Patient: Patient is vitally stable, with no critical labs, and will likely remain stable throughout the transfer process       Expected Time of Arrival for Transfer: 8-24 hours     Recommendations for Management and Stabilization: Not needed      Sanyt Begum MD  Hospitalist  [facility]    Departments of Medicine   Pager: [phone]

## 2019-10-22 NOTE — DISCHARGE SUMMARY
[facility]  Transfer Summary  Name: Abiola Matute    MRN: [id number]  YOB: 1965    Age: 54 year old  Date of Discharge:  10/22/2019  Date of Admission: 10/21/2019  Primary Care Provider: Philly Goncalves  Discharge Physician:  Anam Singer MD  Discharging Service:  Hospitalist    Transferring to [facility]    Hospital Course/TransferDiagnoses:  Abiola Matute is a 54 year old female with past medical history including cirrhosis secondary to alcohol use, Crohn's disease, IBS admitted on 10/21/2019 with epigastric abdominal pain.  Ultrasound in the ER showed several small gallstones with a mildly distended gallbladder and borderline dilatation of the common duct.  There is some question as to the cause of her epigastric pain and ultimately she underwent HIDA scan was nonfilling/suggestive of cholecystitis.  General surgery is recommending transfer to the [facility] for cholecystectomy given comorbidities including liver disease.  The patient herself is feeling reasonably well with improvement in her pain.     Problem List/Assessment and Plan:   1. Suspected acute cholecystitis: As above.  Pain overall improved.    --Remains on IV Unasyn.    --General surgery following, recommended transfer to [facility] following nonfilling HIDA scan.       2.   History of cirrhosis: Reportedly this is due to alcohol.  Chronically is on Lasix, Aldactone.  I note that both of these were continued in the setting of IV fluid administration.  We will hold off for now while n.p.o.  Per Dr. Bhakta the patient actually underwent a liver biopsy in April 2019 showing cirrhosis with moderate steatosis but no autoimmune hepatitis or other liver disease.  --please see GI progress note attestation from today.  There has been consideration given to transferring from Dr. Kellogg's ([facility]) care to [facility] for transplant services.  He reportedly was concerned re: continuing remicade due to risk  of decompensating her liver disease.  Please consider GI consult vs short term follow up w/ N hepatology.     3.   History of hypertension: Remains on lisinopril.     4.   History of Crohn's disease: Follows with [facility].  Managed with Remicade.  Has chronic diarrhea.  Per [facility] she actually has an appointment next week in clinic.     5.   Chronically elevated LFTs.:  Appear to be essentially on baseline.  Overall trended down after admission with IV hydration.  Will monitor periodically.     6.  Pancytopenia: Suspect large part related to liver disease.  Has macrocytosis.  Have seen all cell lines trend down in the setting of IV fluid administration, would continue to monitor.         Discharge Disposition:  Transferred to [facility]     Allergies:  Allergies   Allergen Reactions     Fish Oil      Redness and itching around eye area only - went away when fish oil capsules stopped      Metronidazole      pain/itching     Naphthalenemethylamines      Lamisil = mild urticarial reaction     Penicillins Cramps     Vomiting     Ppd [Tuberculin Purified Protein Derivative]      Sulfa Drugs      hives        Discharge Medications:        Review of your medicines      UNREVIEWED medicines. Ask your doctor about these medicines      Dose / Directions   CINNAMON ALPHA LIPOIC AC CMPLX PO      Dose:  1 tablet  Take 1 tablet by mouth daily  Refills:  0     furosemide 40 MG tablet  Commonly known as:  LASIX      Dose:  40 mg  40 mg daily  Refills:  0     lisinopril 10 MG tablet  Commonly known as:  PRINIVIL/ZESTRIL  Used for:  Essential hypertension with goal blood pressure less than 140/90  Ask about: Which instructions should I use?      Dose:  10 mg  Take 1 tablet (10 mg) by mouth daily  Quantity:  90 tablet  Refills:  3     LORazepam 0.5 MG tablet  Commonly known as:  ATIVAN  Used for:  Claustrophobia      Take 1 tablet by mouth. 1-2 tabs 30-60 minutes prior to exam/procedure/flight  Quantity:  10 tablet  Refills:   "0     milk thistle xtra Caps capsule      Dose:  1 capsule  Take 1 capsule by mouth daily  Refills:  0     MULTIVITAMIN & MINERAL PO  Used for:  Routine general medical examination at a health care facility, IRON DEFIC ANEMIA NOS      Take  by mouth daily.  Refills:  0     spironolactone 100 MG tablet  Commonly known as:  ALDACTONE      Refills:  0            Condition on Discharge:  Discharge condition: Stable       Code status on discharge: Full Code     History of Illness:  See detailed admission note for full details.    Physical Exam:  Vital signs:  Temp: 97.6  F (36.4  C) Temp src: Oral BP: 120/71   Heart Rate: 89 Resp: 16 SpO2: 95 % O2 Device: None (Room air)     Weight: 82.3 kg (181 lb 6.4 oz)  Estimated body mass index is 29.28 kg/m  as calculated from the following:    Height as of 9/23/19: 1.676 m (5' 6\").    Weight as of this encounter: 82.3 kg (181 lb 6.4 oz).    Wt Readings from Last 1 Encounters:   10/22/19 82.3 kg (181 lb 6.4 oz)     General: Alert, awake, no acute distress.  HEENT: NC/AT, eyes anicteric, external occular movements intact, face symmetric.  Dentition WNL, MM moist.  Cardiac: RRR, S1, S2.  No murmurs appreciated.  Pulmonary: Normal chest rise, normal work of breathing.  Lungs CTA BL  Abdomen: Epigastrium tender to palpation, otherwise soft, non-tender, non-distended.  Bowel Sounds Present.  No guarding.  Extremities: no deformities.  Warm, well perfused.  Skin: no rashes or lesions noted.  Warm and Dry.  Neuro: No focal deficits noted.  Speech clear.  Coordination and strength grossly normal.  Psych: Appropriate affect.    Procedures other than Imaging:  HIDA     Imaging:  Results for orders placed or performed during the hospital encounter of 10/21/19   US Abdomen Limited    Narrative    EXAM: ULTRASOUND ABDOMEN LIMITED RIGHT UPPER QUADRANT  LOCATION: [facility]  DATE/TIME: 10/21/2019 2:50 AM    INDICATION: Right upper quadrant pain.  COMPARISON: None.  TECHNIQUE: " Limited abdominal ultrasound.    FINDINGS:    GALLBLADDER: Several small gallstones are present in a mildly distended gallbladder. No convincing gallbladder wall thickening. The ultrasound technologist reports that the patient has focal tenderness over the gallbladder.    BILE DUCTS: No intrahepatic biliary dilatation. The common duct is borderline dilated, measuring 0.7 cm in diameter.    LIVER: Mildly nodular outer contour of the liver. No focal mass.    RIGHT KIDNEY: 10.1 cm in length. No right intrarenal collecting system dilatation, calculi or masses.    A small amount of free fluid in the upper right hemiabdomen.      Impression    IMPRESSION:  1. Several small gallstones are present within a mildly distended gallbladder. Additionally, the ultrasound technologist reports the patient has focal tenderness over the gallbladder. These findings are not diagnostic of acute cholecystitis, but it is   possible. If clinically relevant, HIDA scan could be considered for further evaluation.  2. Borderline dilatation of the common duct.  3. Cirrhotic-appearing liver.  4. A small amount of free fluid in the upper right hemiabdomen.   NM HepatOBiliary Scan    Narrative    NM HEPATOBILIARY SCAN    10/21/2019 4:29 PM.    INDICATION:   RUQ pain, no fever, no elevated WBC, US shows gallstones  only.    COMPARISON: Abdominal ultrasound on same day earlier.    TECHNIQUE:  The patient received 6.0 mCi 99mTc-MEBROFENIN  intravenously. Images were obtained out through 60 minutes.     FINDINGS:  There is prompt clearance of the radionuclide from the  blood pool into the liver. By 10 minutes there is clear visualization  of the intrahepatic ducts as well as the upper common bile duct. At  15-20 minutes there is emptying from the common bile duct into the  small bowel. The gallbladder is not visualized after 60 minutes of  scanning.    Enterogastric reflux is present.      Impression    IMPRESSION:   1. No visualization of the  gallbladder after 60 minutes of scanning.  This could be due to cystic duct obstruction which can be seen with  acute cholecystitis.  2. Mild enterogastric reflux is present.    NATHAN OWUSU MD        Consultations:  Consultation during this admission received from gastroenterology and surgery.       Recent Lab Results:  Recent Labs   Lab 10/22/19  0821 10/21/19  0211   WBC 2.6* 4.0   HGB 11.2* 13.2   HCT 33.4* 39.7   * 107*   PLT 79* 116*          Lab Results   Component Value Date     10/22/2019     10/21/2019     09/23/2019    Lab Results   Component Value Date    CHLORIDE 109 10/22/2019    CHLORIDE 103 10/21/2019    CHLORIDE 106 09/23/2019    Lab Results   Component Value Date    BUN 7 10/22/2019    BUN 6 10/21/2019    BUN 5 09/23/2019      Lab Results   Component Value Date    POTASSIUM 3.7 10/22/2019    POTASSIUM 3.4 10/21/2019    POTASSIUM 4.6 09/23/2019    Lab Results   Component Value Date    CO2 24 10/22/2019    CO2 21 10/21/2019    CO2 23 09/23/2019    Lab Results   Component Value Date    CR 0.51 10/22/2019    CR 0.60 10/21/2019    CR 0.56 09/23/2019        Recent Labs   Lab 10/22/19  0821 10/21/19  0211   AST 70* 98*   ALT 33 47   ALKPHOS 104 153*   BILITOTAL 2.1* 2.7*     Recent Labs   Lab 10/21/19  0211   INR 1.50*          Pending Results:    Unresulted Labs Ordered in the Past 30 Days of this Admission     No orders found for last 31 day(s).           Total time spent in face to face contact with the patient and coordinating discharge was:  40 Minutes.

## 2019-10-23 VITALS
SYSTOLIC BLOOD PRESSURE: 115 MMHG | TEMPERATURE: 98.4 F | WEIGHT: 177.1 LBS | RESPIRATION RATE: 18 BRPM | OXYGEN SATURATION: 98 % | DIASTOLIC BLOOD PRESSURE: 73 MMHG | BODY MASS INDEX: 28.58 KG/M2

## 2019-10-23 LAB
ALBUMIN SERPL-MCNC: 1.6 G/DL (ref 3.4–5)
ALBUMIN SERPL-MCNC: 1.9 G/DL (ref 3.4–5)
ALP SERPL-CCNC: 105 U/L (ref 40–150)
ALP SERPL-CCNC: 91 U/L (ref 40–150)
ALT SERPL W P-5'-P-CCNC: 29 U/L (ref 0–50)
ALT SERPL W P-5'-P-CCNC: 35 U/L (ref 0–50)
ANION GAP SERPL CALCULATED.3IONS-SCNC: 8 MMOL/L (ref 3–14)
ANION GAP SERPL CALCULATED.3IONS-SCNC: 8 MMOL/L (ref 3–14)
AST SERPL W P-5'-P-CCNC: 62 U/L (ref 0–45)
AST SERPL W P-5'-P-CCNC: 73 U/L (ref 0–45)
BILIRUB SERPL-MCNC: 1.8 MG/DL (ref 0.2–1.3)
BILIRUB SERPL-MCNC: 1.9 MG/DL (ref 0.2–1.3)
BUN SERPL-MCNC: 9 MG/DL (ref 7–30)
BUN SERPL-MCNC: 9 MG/DL (ref 7–30)
CALCIUM SERPL-MCNC: 7.7 MG/DL (ref 8.5–10.1)
CALCIUM SERPL-MCNC: 7.7 MG/DL (ref 8.5–10.1)
CHLORIDE SERPL-SCNC: 104 MMOL/L (ref 94–109)
CHLORIDE SERPL-SCNC: 107 MMOL/L (ref 94–109)
CO2 SERPL-SCNC: 24 MMOL/L (ref 20–32)
CO2 SERPL-SCNC: 25 MMOL/L (ref 20–32)
CREAT SERPL-MCNC: 0.52 MG/DL (ref 0.52–1.04)
CREAT SERPL-MCNC: 0.59 MG/DL (ref 0.52–1.04)
ERYTHROCYTE [DISTWIDTH] IN BLOOD BY AUTOMATED COUNT: 14.1 % (ref 10–15)
ERYTHROCYTE [DISTWIDTH] IN BLOOD BY AUTOMATED COUNT: 14.2 % (ref 10–15)
GFR SERPL CREATININE-BSD FRML MDRD: >90 ML/MIN/{1.73_M2}
GFR SERPL CREATININE-BSD FRML MDRD: >90 ML/MIN/{1.73_M2}
GLUCOSE BLDC GLUCOMTR-MCNC: 104 MG/DL (ref 70–99)
GLUCOSE BLDC GLUCOMTR-MCNC: 109 MG/DL (ref 70–99)
GLUCOSE BLDC GLUCOMTR-MCNC: 50 MG/DL (ref 70–99)
GLUCOSE BLDC GLUCOMTR-MCNC: 62 MG/DL (ref 70–99)
GLUCOSE BLDC GLUCOMTR-MCNC: 63 MG/DL (ref 70–99)
GLUCOSE BLDC GLUCOMTR-MCNC: 81 MG/DL (ref 70–99)
GLUCOSE BLDC GLUCOMTR-MCNC: 97 MG/DL (ref 70–99)
GLUCOSE SERPL-MCNC: 100 MG/DL (ref 70–99)
GLUCOSE SERPL-MCNC: 50 MG/DL (ref 70–99)
HCT VFR BLD AUTO: 31.7 % (ref 35–47)
HCT VFR BLD AUTO: 36.2 % (ref 35–47)
HGB BLD-MCNC: 10.4 G/DL (ref 11.7–15.7)
HGB BLD-MCNC: 11.9 G/DL (ref 11.7–15.7)
INR PPP: 1.77 (ref 0.86–1.14)
MCH RBC QN AUTO: 36.1 PG (ref 26.5–33)
MCH RBC QN AUTO: 36.5 PG (ref 26.5–33)
MCHC RBC AUTO-ENTMCNC: 32.8 G/DL (ref 31.5–36.5)
MCHC RBC AUTO-ENTMCNC: 32.9 G/DL (ref 31.5–36.5)
MCV RBC AUTO: 110 FL (ref 78–100)
MCV RBC AUTO: 111 FL (ref 78–100)
PLATELET # BLD AUTO: 104 10E9/L (ref 150–450)
PLATELET # BLD AUTO: 72 10E9/L (ref 150–450)
POTASSIUM SERPL-SCNC: 3.5 MMOL/L (ref 3.4–5.3)
POTASSIUM SERPL-SCNC: 3.5 MMOL/L (ref 3.4–5.3)
PROT SERPL-MCNC: 7 G/DL (ref 6.8–8.8)
PROT SERPL-MCNC: 8 G/DL (ref 6.8–8.8)
RBC # BLD AUTO: 2.88 10E12/L (ref 3.8–5.2)
RBC # BLD AUTO: 3.26 10E12/L (ref 3.8–5.2)
SODIUM SERPL-SCNC: 136 MMOL/L (ref 133–144)
SODIUM SERPL-SCNC: 140 MMOL/L (ref 133–144)
WBC # BLD AUTO: 2.7 10E9/L (ref 4–11)
WBC # BLD AUTO: 3 10E9/L (ref 4–11)

## 2019-10-23 PROCEDURE — 80053 COMPREHEN METABOLIC PANEL: CPT | Performed by: STUDENT IN AN ORGANIZED HEALTH CARE EDUCATION/TRAINING PROGRAM

## 2019-10-23 PROCEDURE — 85027 COMPLETE CBC AUTOMATED: CPT | Performed by: STUDENT IN AN ORGANIZED HEALTH CARE EDUCATION/TRAINING PROGRAM

## 2019-10-23 PROCEDURE — 85027 COMPLETE CBC AUTOMATED: CPT | Performed by: INTERNAL MEDICINE

## 2019-10-23 PROCEDURE — 00000146 ZZHCL STATISTIC GLUCOSE BY METER IP

## 2019-10-23 PROCEDURE — 36415 COLL VENOUS BLD VENIPUNCTURE: CPT | Performed by: INTERNAL MEDICINE

## 2019-10-23 PROCEDURE — 85610 PROTHROMBIN TIME: CPT | Performed by: INTERNAL MEDICINE

## 2019-10-23 PROCEDURE — 36415 COLL VENOUS BLD VENIPUNCTURE: CPT | Performed by: STUDENT IN AN ORGANIZED HEALTH CARE EDUCATION/TRAINING PROGRAM

## 2019-10-23 PROCEDURE — 25800030 ZZH RX IP 258 OP 636

## 2019-10-23 PROCEDURE — 25800030 ZZH RX IP 258 OP 636: Performed by: INTERNAL MEDICINE

## 2019-10-23 PROCEDURE — 80053 COMPREHEN METABOLIC PANEL: CPT | Performed by: INTERNAL MEDICINE

## 2019-10-23 PROCEDURE — 25000132 ZZH RX MED GY IP 250 OP 250 PS 637: Performed by: INTERNAL MEDICINE

## 2019-10-23 PROCEDURE — 25000128 H RX IP 250 OP 636: Performed by: INTERNAL MEDICINE

## 2019-10-23 RX ORDER — OXYCODONE HYDROCHLORIDE 5 MG/1
5 CAPSULE ORAL EVERY 4 HOURS PRN
Qty: 12 CAPSULE | Refills: 0 | Status: SHIPPED | OUTPATIENT
Start: 2019-10-23 | End: 2019-10-23

## 2019-10-23 RX ORDER — OXYCODONE HYDROCHLORIDE 5 MG/1
5 TABLET ORAL EVERY 6 HOURS PRN
Qty: 9 TABLET | Refills: 0 | Status: SHIPPED | OUTPATIENT
Start: 2019-10-23 | End: 2020-04-21

## 2019-10-23 RX ORDER — AMPICILLIN TRIHYDRATE 250 MG
CAPSULE ORAL
COMMUNITY
End: 2019-11-26

## 2019-10-23 RX ORDER — DEXTROSE MONOHYDRATE 25 G/50ML
25 INJECTION, SOLUTION INTRAVENOUS ONCE
Status: DISCONTINUED | OUTPATIENT
Start: 2019-10-23 | End: 2019-10-23 | Stop reason: HOSPADM

## 2019-10-23 RX ORDER — TURMERIC ROOT EXTRACT 500 MG
TABLET ORAL
COMMUNITY
End: 2019-11-26

## 2019-10-23 RX ADMIN — AMPICILLIN AND SULBACTAM 3 G: 2; 1 INJECTION, POWDER, FOR SOLUTION INTRAMUSCULAR; INTRAVENOUS at 02:07

## 2019-10-23 RX ADMIN — FUROSEMIDE 40 MG: 40 TABLET ORAL at 07:50

## 2019-10-23 RX ADMIN — SPIRONOLACTONE 100 MG: 50 TABLET, FILM COATED ORAL at 07:50

## 2019-10-23 RX ADMIN — AMPICILLIN AND SULBACTAM 3 G: 2; 1 INJECTION, POWDER, FOR SOLUTION INTRAMUSCULAR; INTRAVENOUS at 13:59

## 2019-10-23 RX ADMIN — DEXTROSE AND SODIUM CHLORIDE: 5; 900 INJECTION, SOLUTION INTRAVENOUS at 08:29

## 2019-10-23 RX ADMIN — AMPICILLIN AND SULBACTAM 3 G: 2; 1 INJECTION, POWDER, FOR SOLUTION INTRAMUSCULAR; INTRAVENOUS at 07:50

## 2019-10-23 RX ADMIN — DEXTROSE AND SODIUM CHLORIDE 1000 ML: 5; 900 INJECTION, SOLUTION INTRAVENOUS at 08:29

## 2019-10-23 ASSESSMENT — ACTIVITIES OF DAILY LIVING (ADL)
ADLS_ACUITY_SCORE: 11

## 2019-10-23 NOTE — PHARMACY-ADMISSION MEDICATION HISTORY
Admission medication history interview status for the 10/22/2019 admission is complete. See Epic admission navigator for allergy information, pharmacy, prior to admission medications and immunization status.     Medication history interview sources:  patient    Changes made to PTA medication list (reason)  Added:   - turmeric  Deleted: N/A  Changed: N/A    Additional medication history information (including reliability of information, actions taken by pharmacist):  - pt was a good medication historian    Prior to Admission medications    Medication Sig Last Dose Taking? Auth Provider   cinnamon 500 MG CAPS Take 1 capsule by mouth daily Past Week at Unknown time Yes Unknown, Entered By History   furosemide (LASIX) 40 MG tablet Take 40 mg by mouth daily  10/22/2019 at Unknown time Yes Reported, Patient   lisinopril (PRINIVIL/ZESTRIL) 10 MG tablet Take 1 tablet (10 mg) by mouth daily 10/22/2019 at Unknown time Yes Linda Macdonald APRN CNP   Milk Thistle-Dand-Fennel-Licor (MILK THISTLE XTRA) CAPS capsule Take 1 capsule by mouth daily Past Week at Unknown time Yes Unknown, Entered By History   Multiple Vitamins-Minerals (MULTIVITAMIN & MINERAL PO) Take  by mouth daily. Past Week at Unknown time Yes Reported, Patient   spironolactone (ALDACTONE) 100 MG tablet Take 100 mg by mouth daily  10/22/2019 at Unknown time Yes Reported, Patient   Turmeric 500 MG TABS Take 1 capsule by mouth daily Past Week at Unknown time Yes Unknown, Entered By History     Medication history completed by:    Brunilda Mahan, PharmD, MPH  PGY1 Pharmacy Practice Resident

## 2019-10-23 NOTE — PROGRESS NOTES
[facility]    Medicine Progress Note - Hospitalist Service Gold 8       Date of Admission:  10/22/2019  Assessment & Plan   Abiola Matute is a 54 year old female with 54 year old female with past medical history of  alcoholic cirrhosis , Crohn's disease, admitted to [facility] for abdominal pain found to have nonfilling on HIDA scan concerning for cholecystitis,  General surgery recommended transfer to the [facility]. General surgery weighing options with patient between perc nathan vs cholecystectomy.     HIDA scan was nonfilling/suggestive of cholecystitis  Acute cholecystitis:  -hernandez to Zosyn given Unasyn's poor biliary penetration after discussion with PharmD  -Gen surgery consulted, appreciate recs     End-stage liver disease secondary to alcoholic cirrhosis.  Pancytopenia  -Patient has been following up with [facility].  Was recently started on Lasix and spironolactone.  Also had first ascites tapped 2 weeks ago for about 3 L.  No history of hepatic encephalopathy or SBP.   -Patient wants to hold off on seeing our GI team here and continue to work with [facility].   -daily meld labs  -Continue PTA spironolactone and Lasix     Crohn's disease follow with [facility]  -Continue Remicade every 8 weeks.  Last dose was October 4     History of hypertension:  Hold  PTA lisinopril. Might not need this       Diet: NPO for Medical/Clinical Reasons Except for: Meds    DVT Prophylaxis: Pneumatic Compression Devices  Conti Catheter: not present  Code Status: Full Code      Disposition Plan   Expected discharge: 2 - 3 days, recommended to prior living arrangement once resolution of biliary issues.  Entered: Kirit Elkins MD 10/23/2019, 2:55 PM       The patient's care was discussed with the Bedside Nurse, Patient and Gen Surg Consultant.    Kirit Elkins MD  Hospitalist Service, [address]  Pager:  [phone]  Please see sticky note for cross cover information  ______________________________________________________________________    Interval History   Patient doing well this morning. Pain is well controlled. No fevers, no chills, no chest pain, no shortness of breath. Had bowel movement yesterday    ROS:  4 point ROS including Respiratory, CV, GI and , is negative.     Data reviewed today: I reviewed all medications, new labs and imaging results over the last 24 hours. I personally reviewed no images or EKG's today.    Physical Exam   Vital Signs: Temp: 97.9  F (36.6  C) Temp src: Oral BP: 107/62   Heart Rate: 87 Resp: 16 SpO2: 96 % O2 Device: None (Room air)    Weight: 177 lbs 1.6 oz  Gen: NAD, sitting comfortably in bed  Eyes: PERRLA, EOMI, conjuctiva clear  Mouth: OP clear, no lesions  Neck: No cervical LAD  CV: RRR, no murmurs, 2+ radial pulses  RESP: CTA bilaterally, no w/r/c  Abd: soft, nontender, nondistended  Ext: no edema bilaterally  Neuro: CNII-CNXII intact    Data   Recent Labs   Lab 10/23/19  1019 10/23/19  0626 10/22/19  0821 10/21/19  0211   WBC 3.0* 2.7* 2.6* 4.0   HGB 11.9 10.4* 11.2* 13.2   * 110* 110* 107*   * 72* 79* 116*   INR  --  1.77*  --  1.50*    140 136 132*   POTASSIUM 3.5 3.5 3.7 3.4   CHLORIDE 104 107 109 103   CO2 25 24 24 21   BUN 9 9 7 6*   CR 0.59 0.52 0.51* 0.60   ANIONGAP 8 8 3 8   ARIEL 7.7* 7.7* 7.5* 8.3*   * 50* 73 118*   ALBUMIN 1.9* 1.6* 1.7* 2.2*   PROTTOTAL 8.0 7.0 7.4 9.4*   BILITOTAL 1.9* 1.8* 2.1* 2.7*   ALKPHOS 105 91 104 153*   ALT 35 29 33 47   AST 73* 62* 70* 98*   LIPASE  --   --   --  554*   TROPI  --   --   --  <0.015

## 2019-10-23 NOTE — PLAN OF CARE
Pt A&O, up in room independently.  VSS, afebrile, sats maintained on RA.  C/o minimal abd pain, 0.3mg IV dilaudid given prior to transport.  Continues IV unasyn.  Transferred to [facility] this evening.

## 2019-10-23 NOTE — PLAN OF CARE
"[id number]    Pt vitally stable on RA. Denied SOB or chest pain. Pt has \"tolerable\" pain, declines intervention. Denied nausea this shift. Pt is on NPO for possible procedure today. Pt had surgical consult this morning and the team will be back to see pt again. L PIV running 100cc/hr tonight, D/C'd this morning. Pt is on IV Unasyn Q 6hrs. Pt has chronic diarrhea d/t Chron's but has not had a BM since 10/21 as pt has not been eating. 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 No skin issues. Voiding but not saving. Independent with mobility. Will continue to monitor and follow plan of care.   "

## 2019-10-23 NOTE — PROGRESS NOTES
Surgery Progress Note         Subjective: Patient seen at bedside. No acute events overnight. Patient slept well overnight. No pain, nausea or vomiting. Currently NPO, last meal was Sunday evening. On IV Unasyn.    Objective:  Temp:  [97.6  F (36.4  C)-98.7  F (37.1  C)] 98.6  F (37  C)  Heart Rate:  [76-96] 76  Resp:  [16-20] 18  BP: (108-120)/(62-71) 108/62  SpO2:  [95 %-96 %] 96 %  I/O last 3 completed shifts:  In: 113.33 [I.V.:113.33]  Out: -     Physical Exam   General: NAD, awake and alert   CV: Non-cyanotic   Pulm: non labored breathing on room air   Abd: soft, non-distended, non-tender to even deep palpation in the RUQ. No peritonitis. Negative Gil's sign.  Extremities: WWP without edema  Neuro: No focal deficits noted, patient moves all extremities spontaneously       No results found for: NTBNPI, NTBNP  Lab Results   Component Value Date    WBC 2.6 (L) 10/22/2019    HGB 11.2 (L) 10/22/2019    HCT 33.4 (L) 10/22/2019     (H) 10/22/2019    PLT 79 (L) 10/22/2019     Lab Results   Component Value Date    CR 0.51 (L) 10/22/2019     Lab Results   Component Value Date     10/22/2019    POTASSIUM 3.7 10/22/2019    CHLORIDE 109 10/22/2019    CO2 24 10/22/2019    GLC 73 10/22/2019     Lab Results   Component Value Date    GLC 73 10/22/2019     Lab Results   Component Value Date    HGB 11.2 (L) 10/22/2019     Lab Results   Component Value Date    AST 70 (H) 10/22/2019    ALT 33 10/22/2019     (H) 11/02/2017    ALKPHOS 104 10/22/2019    BILITOTAL 2.1 (H) 10/22/2019    BILICONJ 0.0 05/20/2014     Lab Results   Component Value Date    INR 1.50 (H) 10/21/2019     Lab Results   Component Value Date    PLT 79 (L) 10/22/2019     Lab Results   Component Value Date    BUN 7 10/22/2019    CR 0.51 (L) 10/22/2019     Lab Results   Component Value Date    WBC 2.6 (L) 10/22/2019           A/P: Abiola ESCALANTE Juju is a 54 year old female with history of alcoholic cirrhosis (MELD-Na 16) and Crohn's Disease who  presents with RUQ pain. Abdominal ultrasound and HIDA scan are concerning for acute cholecystitis but not definitive and physical exam findings are unremarkable, with a negative delgado's sign. She is on inflixamab for Crohns and is leukopenic at 2.6. Due to her history of cirrhosis with a MELD of 16, percutanous cholectosomy vs laparscopic cholectecomy is being considered.  - Further discuss laparoscopic cholecystectomy vs percutaneous cholecystostomy tube with the patient   -Continue medical management of NPO, IV Unasyn and IV fluids.    Patient seen with , Chief Resident, who will discuss with Dr. Molina, Staff Surgeon.     Horacio Gao, MS-3    Horacio Mancia MD, PhD, PGY-1  General Surgery

## 2019-10-23 NOTE — PLAN OF CARE
Orders to discharge patient received this evening. Paperwork printed and reviewed with patient at bedside. Questions answered. Belongings packed per patient. MIVF stopped and PIV removed prior to departure. AVSS on RA. Abdominal pain tolerable without PO pain meds but being sent on PO oxycodone PRN. BG stable at 109. Low fat diet tolerated. BM x1 today. Voiding not saving. Will facilitate remainder of discharge.

## 2019-10-23 NOTE — CONSULTS
SURGERY CONSULT  Abiola Matute MRN# [id number]   YOB: 1965 Age: 54 year old     Date of Admission: 10/23/19    REASON FOR CONSULTATION: cholecystitis     HPI: Abiola Matute is a 54 year old year old female with history of alcoholic cirrhosis and Crohn's disease (on Remicade) who presents as a direct admission from OSH with acute cholecystitis. She complains of upper abdominal pain for the past 2 days as well as nausea, no vomiting. Denies fevers, though complains of chills. No changes in stools. No urinary symptoms. Workup thus far significant for RUQ with cholelithiasis as well as ascites and a distended GB. CBD 0.67 cm. HIDA scan with no GB filling after 60 minutes. WBC 2.6. bedtime 2.1, INR 1.5. MELD-Na 16, Child's Class B. Her cirrhosis was reportedly well compensated until recently, when she was hospitalized with ascites requiring paracentesis. She is on Unasyn.  Surgical history includes hysterectomy, appendectomy.    REVIEW OF SYSTEMS: The remainder of the complete ROS was negative unless noted in the HPI. Denies visual changes, headache, sore throat, rhinorrhea, chest pain, sob, fevers, night sweats, weight loss.     PAST MEDICAL HISTORY:   Past Medical History:   Diagnosis Date     Atypical ductal hyperplasia of breast 9/10/10    ERT not recommended -left - and flat epithelial atypia-scheduled for breast biopsy 9/17/2010      BIFID UVULA (aka UVULA)       Contact dermatitis and other eczema, due to unspecified cause     perianal  - recurrent - clobetasol      Hypertension      IBS (irritable bowel syndrome)      Obesity, unspecified      Other isolated or specific phobias     fear of flying - gets Ativan prn.        PAST SURGICAL HISTORY:   Past Surgical History:   Procedure Laterality Date     BIOPSY ANAL N/A 3/28/2019    anal biopsy and culure placement of seton - Dr Fleming     BREAST BIOPSY, RT/LT  9/17/2010    left - scheduled with Dr. Kojo WILKERSON APPENDECTOMY  at age 14      COLONOSCOPY  2006     COLONOSCOPY N/A 12/23/2014    Procedure: COMBINED COLONOSCOPY, SINGLE OR MULTIPLE BIOPSY/POLYPECTOMY BY BIOPSY;  Surgeon: Diane Fleming MD;  Location:  GI     EXAM UNDER ANESTHESIA ANUS N/A 3/28/2019    Procedure: EXAM UNDER ANESTHESIA ANUS;  Surgeon: Diane Fleming MD;  Location:  OR     HC CLOSED TX BIMALLEOLAR ANKLE FX W/O MANIPULATION  at age 27    left ankle ORIF, plates and screws removed at age 36     HYSTERECTOMY, VAGINAL  2006    with Dr. Licha Zhou - with BSO for fibroids      SURGICAL HISTORY OF -   4/15/2010    Pelviscopy with removal of bilateral hydrosalpinges.        ALLERGIES:    Allergies   Allergen Reactions     Fish Oil      Redness and itching around eye area only - went away when fish oil capsules stopped      Metronidazole      pain/itching     Naphthalenemethylamines      Lamisil = mild urticarial reaction     Penicillins Cramps     Vomiting     Ppd [Tuberculin Purified Protein Derivative]      Sulfa Drugs      hives       HOME MEDICATIONS: No current facility-administered medications on file prior to encounter.   Alpha Lipoic Acid-Cr-Cinnamon (CINNAMON ALPHA LIPOIC AC CMPLX PO), Take 1 tablet by mouth daily  furosemide (LASIX) 40 MG tablet, 40 mg daily   lisinopril (PRINIVIL/ZESTRIL) 10 MG tablet, Take 1 tablet (10 mg) by mouth daily  Milk Thistle-Dand-Fennel-Licor (MILK THISTLE XTRA) CAPS capsule, Take 1 capsule by mouth daily  Multiple Vitamins-Minerals (MULTIVITAMIN & MINERAL PO), Take  by mouth daily.  spironolactone (ALDACTONE) 100 MG tablet,   LORazepam (ATIVAN) 0.5 MG tablet, Take 1 tablet by mouth. 1-2 tabs 30-60 minutes prior to exam/procedure/flight        SOCIAL HISTORY:   Social History     Tobacco Use     Smoking status: Never Smoker     Smokeless tobacco: Never Used   Substance Use Topics     Alcohol use: Yes     Alcohol/week: 0.0 standard drinks     Comment: occasional     Drug use: No     Comment: no herbal meds either       FAMILY  HISTORY:   Family History   Problem Relation Age of Onset     Breast Cancer Mother      Gastrointestinal Disease Mother      Heart Disease Father 57     Heart Disease Paternal Grandfather      Hypertension Maternal Grandmother      Diabetes Paternal Grandmother      Diabetes Maternal Grandfather      Cancer Sister        PHYSICAL EXAMINATION:  /62 (BP Location: Left arm)   Temp 98.6  F (37  C) (Oral)   Resp 18   Wt 80.3 kg (177 lb 1.6 oz)   LMP 05/31/2006   SpO2 96%   BMI 28.58 kg/m       General: NAD, awake and alert   CV: Non-cyanotic   Pulm: No increased work of breathing on room air   Abd: soft, non-distended, non-tender to even deep palpation in the RUQ. No peritonitis.   : No maxwell   Extremities: WWP without edema  Neuro: No focal deficits noted, patient moves all extremities spontaneously    LABS:  Recent Labs   Lab 10/22/19  0821   WBC 2.6*   RBC 3.05*   HGB 11.2*   HCT 33.4*   *   MCH 36.7*   MCHC 33.5   RDW 14.4   PLT 79*       Recent Labs   Lab 10/22/19  0821      POTASSIUM 3.7   CHLORIDE 109   CO2 24   BUN 7   CR 0.51*   GLC 73   ARIEL 7.5*       Recent Labs   Lab 10/22/19  0821 10/21/19  0211   AST 70* 98*   ALT 33 47   ALKPHOS 104 153*   BILITOTAL 2.1* 2.7*   ALBUMIN 1.7* 2.2*   INR  --  1.50*       IMAGING: reviewed   NM HEPATOBILIARY SCAN    10/21/2019 4:29 PM.  IMPRESSION:   1. No visualization of the gallbladder after 60 minutes of scanning.  This could be due to cystic duct obstruction which can be seen with  acute cholecystitis.  2. Mild enterogastric reflux is present.    EXAM: ULTRASOUND ABDOMEN LIMITED RIGHT UPPER QUADRANT  LOCATION: [facility]  DATE/TIME: 10/21/2019 2:50 AM   IMPRESSION:  1. Several small gallstones are present within a mildly distended gallbladder. Additionally, the ultrasound technologist reports the patient has focal tenderness over the gallbladder. These findings are not diagnostic of acute cholecystitis, but it is possible. If  clinically relevant, HIDA scan could be considered for further evaluation.  2. Borderline dilatation of the common duct.  3. Cirrhotic-appearing liver.  4. A small amount of free fluid in the upper right hemiabdomen.    A/P: Abiola Matute is a 54 year old female with history of alcoholic cirrhosis (MELD-Na 16) and Crohn's Disease who presents with acute cholecystitis. Will further discuss laparoscopic cholecystectomy vs percutaneous cholecystostomy tube with the patient and her family tomorrow.    - Agree with admission to medicine service    - AM labs to include LFTs  - NPO at midnight  - Surgery will continue to follow       Discussed with Dr. Jesse Franco   Surgery Resident   [id number]

## 2019-10-23 NOTE — H&P
[facility] History [facility] Physical  Department of Internal Medicine    Patient Name: Abiola Matute MRN# [id number]   Age: 54 year old YOB: 1965     Date of Admission:10/22/2019    Primary care provider: Philly Goncalves  Date of Service: 10/22/2019  Admitting Team: [facility]          Assessment and Plan:   Abiola Matute is a 54 year old female with 54 year old female with past medical history of  alcoholic cirrhosis , Crohn's disease, admitted to [facility] for abdominal pain found to have nonfilling on HIDA scan concerning for cholecystitis,  General surgery is recommending transfer to the [facility]      #. HIDA scan was nonfilling/suggestive of cholecystitis  #.? acute cholecystitis:  Negative Gil, pt patient has been getting Unasyn at outside hospital.   -We will continue Unasyn  -Surgery to evaluate for possible cholecystectomy    #.  End-stage liver disease secondary to alcoholic cirrhosis. Last drink was in September   #.  Pancytopenia  -Patient has been following up with [facility].  Was recently started on Lasix and spironolactone.  Also had first ascites tapped 2 weeks ago for about 3 L.  No history of hepatic encephalopathy or SBP.   -Patient wants to hold off on seeing our GI team here and continue to work with [facility].   -We will check daily meld labs  -Continue PTA spironolactone and Lasix    #.  History of Crohn's disease follow with [facility]  -Continue Remicade every 8 weeks.  Last dose was October 4    #.  History of hypertension:  Hold  PTA lisinopril. Might not need this       CODE: Full code  Diet/IVF: N.p.o.  DVT ppx: SCD  Disposition/Admission Status: Anticipate 1-2 days, may be here more than 2 midnights    Santy Begum MD  Hospitalist  [facility]    Departments of Medicine   Pager: [phone]               Chief Complaint:     Sent from [facility] for surgical evaluation of  possible cholecystectomy       HPI:    54 year old female with 54 year old female with past medical history including cirrhosis secondary to alcohol use, Crohn's disease, admitted to [facility] for abdominal pain found to have nonfilling on HIDA scan concerning for cholecystitis patient referred here for surgical evaluation.  Patient reports her abdominal pain has improved.  No history of hepatic encephalopathy, no hematemesis.  No fever no chills. Last drink in september     Past Medical History:   Diagnosis Date     Atypical ductal hyperplasia of breast 9/10/10    ERT not recommended -left - and flat epithelial atypia-scheduled for breast biopsy 9/17/2010      BIFID UVULA (aka UVULA)       Contact dermatitis and other eczema, due to unspecified cause     perianal  - recurrent - clobetasol      Hypertension      IBS (irritable bowel syndrome)      Obesity, unspecified      Other isolated or specific phobias     fear of flying - gets Ativan prn.             Past Surgical History:     Past Surgical History:   Procedure Laterality Date     BIOPSY ANAL N/A 3/28/2019    anal biopsy and culure placement of seton - Dr Fleming     BREAST BIOPSY, RT/LT  9/17/2010    left - scheduled with Dr. Kojo WILKERSON APPENDECTOMY  at age 14     COLONOSCOPY  2006     COLONOSCOPY N/A 12/23/2014    Procedure: COMBINED COLONOSCOPY, SINGLE OR MULTIPLE BIOPSY/POLYPECTOMY BY BIOPSY;  Surgeon: Diane Fleming MD;  Location:  GI     EXAM UNDER ANESTHESIA ANUS N/A 3/28/2019    Procedure: EXAM UNDER ANESTHESIA ANUS;  Surgeon: Diane Fleming MD;  Location:  OR      CLOSED TX BIMALLEOLAR ANKLE FX W/O MANIPULATION  at age 27    left ankle ORIF, plates and screws removed at age 36     HYSTERECTOMY, VAGINAL  2006    with Dr. Licha Zhou - with BSO for fibroids      SURGICAL HISTORY OF -   4/15/2010    Pelviscopy with removal of bilateral hydrosalpinges.               Social History:     Social History     Socioeconomic History      Marital status:      Spouse name: Albino     Number of children: 3     Years of education: 14     Highest education level: Not on file   Occupational History     Occupation: [employer]     Comment:    Social Needs     Financial resource strain: Not on file     Food insecurity:     Worry: Not on file     Inability: Not on file     Transportation needs:     Medical: Not on file     Non-medical: Not on file   Tobacco Use     Smoking status: Never Smoker     Smokeless tobacco: Never Used   Substance and Sexual Activity     Alcohol use: Yes     Alcohol/week: 0.0 standard drinks     Comment: occasional     Drug use: No     Comment: no herbal meds either     Sexual activity: Yes     Partners: Male     Birth control/protection: Surgical     Comment: harper had vasectomy   Lifestyle     Physical activity:     Days per week: Not on file     Minutes per session: Not on file     Stress: Not on file   Relationships     Social connections:     Talks on phone: Not on file     Gets together: Not on file     Attends [employer] service: Not on file     Active member of club or organization: Not on file     Attends meetings of clubs or organizations: Not on file     Relationship status: Not on file     Intimate partner violence:     Fear of current or ex partner: Not on file     Emotionally abused: Not on file     Physically abused: Not on file     Forced sexual activity: Not on file   Other Topics Concern      Service No     Blood Transfusions No     Caffeine Concern No     Comment: rarely drinks caffeine     Occupational Exposure Not Asked     Hobby Hazards Not Asked     Sleep Concern Not Asked     Stress Concern Not Asked     Weight Concern Not Asked     Special Diet Not Asked     Back Care Not Asked     Exercise Yes     Comment: does a lot of walking - 4x/week     Bike Helmet Not Asked     Seat Belt Yes     Comment: always     Self-Exams Yes     Comment: SBE encouraged monthly      Parent/sibling w/ CABG, MI or angioplasty before 65F 55M? Yes   Social History Narrative    calcium - drinks 5-6 large glasses skim milk/day    flex sig/colonoscopy -at age 49    sun precautions - discussed    mammogram - needs every 2 years in her 30's, then yearly from then on    Td booster - 9/99 and 4/27/2010    pneumovax -at age 59    DEXA -when perimenopausal    stool hemoccults - every year after age 40    ASA- start at age 39    mulvitamin - encouraged            Family History:     Family History   Problem Relation Age of Onset     Breast Cancer Mother      Gastrointestinal Disease Mother      Heart Disease Father 57     Heart Disease Paternal Grandfather      Hypertension Maternal Grandmother      Diabetes Paternal Grandmother      Diabetes Maternal Grandfather      Cancer Sister               Immunizations:     Immunization History   Administered Date(s) Administered     Influenza (IIV3) PF 11/01/2014     Influenza Quad, Recombinant, p-free (RIV4) 10/03/2019     Influenza Vaccine IM > 6 months Valent IIV4 11/02/2016     MMR 04/24/2007     TD (ADULT, 7+) 09/16/1999     TDAP Vaccine (Boostrix) 04/07/2010              Allergies:      Allergies   Allergen Reactions     Fish Oil      Redness and itching around eye area only - went away when fish oil capsules stopped      Metronidazole      pain/itching     Naphthalenemethylamines      Lamisil = mild urticarial reaction     Penicillins Cramps     Vomiting     Ppd [Tuberculin Purified Protein Derivative]      Sulfa Drugs      hives            Medications:     No current facility-administered medications on file prior to encounter.   Alpha Lipoic Acid-Cr-Cinnamon (CINNAMON ALPHA LIPOIC AC CMPLX PO), Take 1 tablet by mouth daily  furosemide (LASIX) 40 MG tablet, 40 mg daily   lisinopril (PRINIVIL/ZESTRIL) 10 MG tablet, Take 1 tablet (10 mg) by mouth daily  LORazepam (ATIVAN) 0.5 MG tablet, Take 1 tablet by mouth. 1-2 tabs 30-60 minutes prior to  exam/procedure/flight  Milk Thistle-Dand-Fennel-Licor (MILK THISTLE XTRA) CAPS capsule, Take 1 capsule by mouth daily  Multiple Vitamins-Minerals (MULTIVITAMIN & MINERAL PO), Take  by mouth daily.  spironolactone (ALDACTONE) 100 MG tablet,              Review of Systems:     A complete ROS was performed and is negative other than what is stated in the HPI.          Physical Exam:   Blood pressure 114/65, temperature 98.7  F (37.1  C), temperature source Oral, resp. rate 19, weight 80.3 kg (177 lb 1.6 oz), last menstrual period 05/31/2006, SpO2 95 %, not currently breastfeeding.  General Appearance: Pleasant not in distress   HEENT: No jaundice, moist BM   Respiratory: CTAB  Cardiovascular: RRR, s1, s2 no m/g   GI: Mild distension with ascites  non tender NABS   Genitourinary: No CVAT   Extremities : trace Edema   Neurologic: A&Ox3, moves all extremities equally , no asterixis          Data:   Reviewed in Epic

## 2019-10-23 NOTE — PLAN OF CARE
"  /65 (BP Location: Right arm)   Temp 98.7  F (37.1  C) (Oral)   Resp 19   Wt 80.3 kg (177 lb 1.6 oz)   LMP 05/31/2006   SpO2 95%   BMI 28.58 kg/m      [id number]  Abiola \"[place]\" Vlasin admitted to  via ambulance from [facility]; pt was initially admitted for upper abdominal pain; US at [facility] showed small gallstones w/mildly distended gallbladder & borderline dilatation of common duct; transferred to [facility] for possible cholecystectomy or drain placement. A/Ox4, pleasant and cooperative with cares. Admission database and med rec completed. Pt brought home medications--sent down to security per hospital protocol. Endorses upper abdominal pain but states pain is \"manageable;\" (pt received IV dilaudid prior to transport); declining interventions now. Denied n/v--NPO since Sunday; per team, continue with NPO status except meds. Up ad martinez in room; voiding not saving, last BM 10/21. Pt denies pressure injuries or skin issues; does endorse anal fistulas that came about d/t recent crohn's diagnosis this past April. PIV patent with NS@100ml/hr; on IV Unasyn q6hrs. Pt oriented to room/floor/call light; pt resting comfortably now, watching television. Plan for further workup tomorrow--NPO in case of procedure. Call light and belongings within reach. Will continue to monitor and continue POC/notify team as needed.   "

## 2019-10-24 ENCOUNTER — TELEPHONE (OUTPATIENT)
Dept: FAMILY MEDICINE | Facility: CLINIC | Age: 54
End: 2019-10-24

## 2019-10-24 DIAGNOSIS — Z71.89 OTHER SPECIFIED COUNSELING: ICD-10-CM

## 2019-10-24 NOTE — TELEPHONE ENCOUNTER
ED / Discharge Outreach Protocol    Patient Contact    Attempt # 1    Was call answered?  No.  Left message on voicemail with information to call me back.    Of note, patient has follow up appointment scheduled with surgeon next week. It also appears there is a care coordination encounter open - they may be outreaching to patient as well. Will continue to monitor chart.  KATERINA KingN, RN  [facility]

## 2019-10-24 NOTE — TELEPHONE ENCOUNTER
Pt was DC'd from [place] on 10/23/2019 after being treated for Cholecystitis.  Next scheduled appt with PCP is not scheduled but has appt with specialist.  Please call patient.  Thank you!  Ghazal Matta

## 2019-10-25 ENCOUNTER — PATIENT OUTREACH (OUTPATIENT)
Dept: CARE COORDINATION | Facility: CLINIC | Age: 54
End: 2019-10-25

## 2019-10-25 NOTE — PROGRESS NOTES
CCC Referral: Attempt 1  Community Health Worker called and left a message for the patient in order to discuss enrollment with Clinic Care Coordination.    If the patient is returning my call, please transfer her to me, Tanner, at [phone].    Per chart review  Patient was presented in ED for abdominal pain and found to have cholecystitis  Admitted after surgery    Order Details   Proc category: Referral Class: Local Print   Standing status: Normal Order status: Sent   Enc provider: Philly Goncalves MD Enc department:  Care Coordination   Order date: 10/24/2019 Order user: Cathryn Goss SENTHIL   Visit type: [facility] PHONE VISIT Appointment Window:     Comments   CHW to outreach  Referral made off of discharge report.   ______________________  Next Outreach: 10/28/19

## 2019-10-28 ENCOUNTER — PATIENT OUTREACH (OUTPATIENT)
Dept: CARE COORDINATION | Facility: CLINIC | Age: 54
End: 2019-10-28

## 2019-10-28 NOTE — TELEPHONE ENCOUNTER
See 10/25/19 Care Coordination encounter.    Post Discharge Medication Reconciliation Status: unable to reconcile discharge medications due to Care Coordination and surgery following up with patient.    KATERINA KingN, RN  [facility]

## 2019-10-28 NOTE — LETTER
October 28, 2019      Abiola Matute  [address] [address]      Dear [place],                                                                         You were recently referred to the [facility]'s Clinic Care Coordination service.  This is a service offered through your Primary Care Clinic which can help you access resources, services in regard to your health and well-being goals. The clinic Community Health Worker has placed two calls to you to discuss the nature of this service and to offer enrollment.  If you are interested in learning more about Clinic Care Coordination, please call your Primary Care Clinic's Community Health Worker, Tanner Baltazar, at [phone].                                                    Sincerely,                                                                              Tanner Baltazar [facility]                                                                                          Clinic Care Coordination                                                  [facility] : [place], [place] and [place]                               Phone: [phone]

## 2019-10-28 NOTE — PROGRESS NOTES
Community Health Worker called and left a message for the patient.  If the patient is returning my call, please transfer the patient to [facility] at [phone].   Patient has been mailed a unreachable letter and was provided with CHW contact information if they are interested in accessing Clinic Care Coordination.  Order for Care Management has been closed, no further outreach will be done at this time and patient can be re-referred.

## 2019-10-30 ENCOUNTER — PATIENT OUTREACH (OUTPATIENT)
Dept: GASTROENTEROLOGY | Facility: CLINIC | Age: 54
End: 2019-10-30

## 2019-10-30 DIAGNOSIS — K72.90 DECOMPENSATION OF CIRRHOSIS OF LIVER (H): Primary | ICD-10-CM

## 2019-10-30 DIAGNOSIS — K74.60 DECOMPENSATION OF CIRRHOSIS OF LIVER (H): Primary | ICD-10-CM

## 2019-10-30 NOTE — TELEPHONE ENCOUNTER
another new IBD patient      Needs to see liver - decompensated alcoholic cirrhosis     And me - she also has Crohn's      Voice mail message and my chart message for pt.   Referral for hepatology and in basket to hept team.

## 2019-10-30 NOTE — PROGRESS NOTES
another new IBD patient from [facility]     Needs to see liver - decompensated alcoholic cirrhosis     And me - she also has Crohn's

## 2019-11-01 ENCOUNTER — PATIENT OUTREACH (OUTPATIENT)
Dept: GASTROENTEROLOGY | Facility: CLINIC | Age: 54
End: 2019-11-01

## 2019-11-01 NOTE — PROGRESS NOTES
Contacted pt to discuss appt with Dr. Schilling.  Left my name and number. Also in basket message to hepatology for referral

## 2019-11-01 NOTE — TELEPHONE ENCOUNTER
Pt scheduled for an appt with Dr. Schilling on November 12 at 3 pm. Pt given the number to call to schedule hepatology. Pt states she also was instructed to see colon and rectal. Informed pt that I will check with Dr. Schilling or can be discussed further at her November 12 at appt.

## 2019-11-04 NOTE — TELEPHONE ENCOUNTER
RECORDS RECEIVED FROM: [facility] (Scanned in epic to review)    DATE RECEIVED: 11/12/19    NOTES STATUS DETAILS   OFFICE NOTE from referring provider Internal Internal referral    OFFICE NOTE from other specialist Received [facility] recs scanned in epic    DISCHARGE SUMMARY from hospital N/A    OPERATIVE REPORT N/A    MEDICATION LIST N/A         ENDOSCOPY  N/A    COLONOSCOPY Received 10/23/18   ERCP N/A    EUS N/A    STOOL TESTING Internal  5/20/14   PERTINENT LABS Internal    PATHOLOGY REPORTS (RELATED) N/A    IMAGING (CT, MRI, EGD) Internal US Abd 10/21/19  US ABD 10/9/19      REFERRAL INFORMATION    Date referral was placed: 11/12/19   Date all records received: N/A   Date records were scanned into Epic: N/A   Date records were sent to Provider to review: N/A   Date and recommendation received from provider:  LETTER SENT  SCHEDULE APPOINTMENT   Date patient was contacted to schedule: 11/1/19

## 2019-11-07 ENCOUNTER — TELEPHONE (OUTPATIENT)
Dept: GASTROENTEROLOGY | Facility: CLINIC | Age: 54
End: 2019-11-07

## 2019-11-07 NOTE — TELEPHONE ENCOUNTER
Called and left message for patient reminding of appointment scheduled on 11/12/19 at 320pm with  [facility]. Patient to arrive 15 min early. To reschedule or cancel patient to call [phone].    ALEJA Jarquin

## 2019-11-08 NOTE — DISCHARGE SUMMARY
[facility] [place]  Hospitalist Discharge Summary       Date of Admission:  10/22/2019  Date of Discharge:  10/23/2019  6:37 PM  Discharging Provider: Kirit Elkins MD  Discharge Team: Hospitalist Service, Gold 8    Discharge Diagnoses   Acute Cholecystitis  ESLD due to ETOH Cirrhosis  Crohn's Disease  HTN    Follow-ups Needed After Discharge   Follow-up Appointments     Adult [facility] Follow-up and recommended labs and tests      Follow up with primary care provider, Philly Goncalves, within 7   days for hospital follow- up.  No follow up labs or test are needed.    Please follow up with the General Surgery Team, and Dr. Molina in 2 weeks.         Appointments on [place] and/or [facility] (with [facility] or [facility]   provider or service). Call [phone] if you haven't heard regarding   these appointments within 7 days of discharge.             Unresulted Labs Ordered in the Past 30 Days of this Admission     No orders found from 9/22/2019 to 10/23/2019.      These results will be followed up by NA    Discharge Disposition   Discharged to home  Condition at discharge: Stable    Hospital Course   Abiola Matute is a 54 year old female with 54 year old female with past medical history of  alcoholic cirrhosis , Crohn's disease, admitted to [facility] for abdominal pain found to have nonfilling on HIDA scan concerning for cholecystitis,  General surgery recommended transfer to the [facility]. General surgery weighing options with patient between perc nathan vs cholecystectomy but decided given that patient clinically improving to continue antibiotics and do surgery at a later date.      HIDA scan was nonfilling/suggestive of cholecystitis  Acute cholecystitis:  -hernandez to Zosyn given Unasyn's poor biliary penetration after discussion with PharmD  -Gen surgery consulted, discharge on oral antibiotics and follow up as outpatient.      End-stage liver disease  secondary to alcoholic cirrhosis.  Pancytopenia  -Patient has been following up with [facility].  Was recently started on Lasix and spironolactone.  Also had first ascites tapped 2 weeks ago for about 3 L.  No history of hepatic encephalopathy or SBP.   -Patient would like to transfer to our GI, will give phone number for transfer  -daily meld labs  -Continue PTA spironolactone and Lasix     Crohn's disease follow with [facility]  -Continue PTA Remicade every 8 weeks.  Last dose was October 4     Consultations This Hospital Stay   SURGERY GENERAL ADULT IP CONSULT  MEDICATION HISTORY IP PHARMACY CONSULT    Code Status   Prior    Time Spent on this Encounter   I, Kirit Elkins MD, personally saw the patient today and spent greater than 30 minutes discharging this patient.       Kirit Elkins MD  [facility] [place]  ______________________________________________________________________    Physical Exam   Vital Signs:                    Weight: 177 lbs 1.6 oz  Gen: NAD, sitting comfortably in bed  Eyes: PERRLA, EOMI, conjuctiva clear  Mouth: OP clear, no lesions  Neck: No cervical LAD  CV: RRR, no murmurs, 2+ radial pulses  RESP: CTA bilaterally, no w/r/c  Abd: soft, nontender, nondistended  Ext: no edema bilaterally  Neuro: CNII-CNXII intact         Primary Care Physician   No primary care provider on file.    Discharge Orders      Reason for your hospital stay    Acute cholecysitis, or inflammation of the gallbladder.     Adult [facility] Follow-up and recommended labs and tests    Follow up with primary care provider, Philly Goncalves, within 7 days for hospital follow- up.  No follow up labs or test are needed.    Please follow up with the General Surgery Team, and Dr. Molina in 2 weeks.       Appointments on [place] and/or [facility] (with [facility] or [facility] provider or service). Call [phone] if you haven't heard regarding these appointments within 7 days of  discharge.     Activity    Your activity upon discharge: activity as tolerated     Discharge Instructions    Follow a low fat diet to minimize symptoms. Take Anitbiotics (Augmentin) twice daily for 7 days. Follow up with General Surgery and Dr. Molina in 2-3 weeks.     You have been given phone numbers to change your GI and Liver doctors to the [facility].     Diet    Follow this diet upon discharge: Orders Placed This Encounter      Low Fat Diet       Significant Results and Procedures   Results for orders placed or performed during the hospital encounter of 10/21/19   US Abdomen Limited    Narrative    EXAM: ULTRASOUND ABDOMEN LIMITED RIGHT UPPER QUADRANT  LOCATION: [facility]  DATE/TIME: 10/21/2019 2:50 AM    INDICATION: Right upper quadrant pain.  COMPARISON: None.  TECHNIQUE: Limited abdominal ultrasound.    FINDINGS:    GALLBLADDER: Several small gallstones are present in a mildly distended gallbladder. No convincing gallbladder wall thickening. The ultrasound technologist reports that the patient has focal tenderness over the gallbladder.    BILE DUCTS: No intrahepatic biliary dilatation. The common duct is borderline dilated, measuring 0.7 cm in diameter.    LIVER: Mildly nodular outer contour of the liver. No focal mass.    RIGHT KIDNEY: 10.1 cm in length. No right intrarenal collecting system dilatation, calculi or masses.    A small amount of free fluid in the upper right hemiabdomen.      Impression    IMPRESSION:  1. Several small gallstones are present within a mildly distended gallbladder. Additionally, the ultrasound technologist reports the patient has focal tenderness over the gallbladder. These findings are not diagnostic of acute cholecystitis, but it is   possible. If clinically relevant, HIDA scan could be considered for further evaluation.  2. Borderline dilatation of the common duct.  3. Cirrhotic-appearing liver.  4. A small amount of free fluid in the upper right  hemiabdomen.   NM HepatOBiliary Scan    Narrative    NM HEPATOBILIARY SCAN    10/21/2019 4:29 PM.    INDICATION:   RUQ pain, no fever, no elevated WBC, US shows gallstones  only.    COMPARISON: Abdominal ultrasound on same day earlier.    TECHNIQUE:  The patient received 6.0 mCi 99mTc-MEBROFENIN  intravenously. Images were obtained out through 60 minutes.     FINDINGS:  There is prompt clearance of the radionuclide from the  blood pool into the liver. By 10 minutes there is clear visualization  of the intrahepatic ducts as well as the upper common bile duct. At  15-20 minutes there is emptying from the common bile duct into the  small bowel. The gallbladder is not visualized after 60 minutes of  scanning.    Enterogastric reflux is present.      Impression    IMPRESSION:   1. No visualization of the gallbladder after 60 minutes of scanning.  This could be due to cystic duct obstruction which can be seen with  acute cholecystitis.  2. Mild enterogastric reflux is present.    NATHAN OWUSU MD       Discharge Medications   Discharge Medication List as of 10/23/2019  4:41 PM      START taking these medications    Details   amoxicillin-clavulanate (AUGMENTIN) 875-125 MG tablet Take 1 tablet by mouth 2 times daily, Disp-14 tablet, R-0, E-Prescribe      oxyCODONE (OXY-IR) 5 MG capsule Take 1 capsule (5 mg) by mouth every 4 hours as needed for severe pain, Disp-12 capsule, R-0, Local Print         CONTINUE these medications which have NOT CHANGED    Details   furosemide (LASIX) 40 MG tablet 40 mg daily , Historical      lisinopril (PRINIVIL/ZESTRIL) 10 MG tablet Take 1 tablet (10 mg) by mouth daily, Disp-90 tablet, R-3, E-Prescribe      Milk Thistle-Dand-Fennel-Licor (MILK THISTLE XTRA) CAPS capsule Take 1 capsule by mouth daily, Historical      Multiple Vitamins-Minerals (MULTIVITAMIN & MINERAL PO) Take  by mouth daily., Historical      spironolactone (ALDACTONE) 100 MG tablet Historical      Alpha Lipoic  Acid-Cr-Cinnamon (CINNAMON ALPHA LIPOIC AC CMPLX PO) Take 1 tablet by mouth daily, Historical      LORazepam (ATIVAN) 0.5 MG tablet Take 1 tablet by mouth. 1-2 tabs 30-60 minutes prior to exam/procedure/flight, Disp-10 tablet, R-0, Local Print           Allergies   Allergies   Allergen Reactions     Fish Oil      Redness and itching around eye area only - went away when fish oil capsules stopped      Metronidazole      pain/itching     Naphthalenemethylamines      Lamisil = mild urticarial reaction     Penicillins Cramps     Vomiting     Ppd [Tuberculin Purified Protein Derivative]      Sulfa Drugs      hives

## 2019-11-12 ENCOUNTER — OFFICE VISIT (OUTPATIENT)
Dept: GASTROENTEROLOGY | Facility: CLINIC | Age: 54
End: 2019-11-12
Payer: COMMERCIAL

## 2019-11-12 ENCOUNTER — PRE VISIT (OUTPATIENT)
Dept: GASTROENTEROLOGY | Facility: CLINIC | Age: 54
End: 2019-11-12

## 2019-11-12 VITALS
WEIGHT: 159.2 LBS | HEIGHT: 66 IN | HEART RATE: 113 BPM | DIASTOLIC BLOOD PRESSURE: 81 MMHG | BODY MASS INDEX: 25.58 KG/M2 | TEMPERATURE: 99.1 F | OXYGEN SATURATION: 99 % | SYSTOLIC BLOOD PRESSURE: 115 MMHG

## 2019-11-12 DIAGNOSIS — K74.69 OTHER CIRRHOSIS OF LIVER (H): ICD-10-CM

## 2019-11-12 DIAGNOSIS — K50.113 CROHN'S DISEASE OF LARGE INTESTINE WITH FISTULA (H): ICD-10-CM

## 2019-11-12 LAB
ALBUMIN SERPL-MCNC: 2.5 G/DL (ref 3.4–5)
ALP SERPL-CCNC: 124 U/L (ref 40–150)
ALT SERPL W P-5'-P-CCNC: 43 U/L (ref 0–50)
AST SERPL W P-5'-P-CCNC: 77 U/L (ref 0–45)
BASOPHILS # BLD AUTO: 0 10E9/L (ref 0–0.2)
BASOPHILS NFR BLD AUTO: 0.7 %
BILIRUB DIRECT SERPL-MCNC: 0.9 MG/DL (ref 0–0.2)
BILIRUB SERPL-MCNC: 1.8 MG/DL (ref 0.2–1.3)
CRP SERPL-MCNC: 12.8 MG/L (ref 0–8)
DIFFERENTIAL METHOD BLD: ABNORMAL
EOSINOPHIL # BLD AUTO: 0.2 10E9/L (ref 0–0.7)
EOSINOPHIL NFR BLD AUTO: 4 %
ERYTHROCYTE [DISTWIDTH] IN BLOOD BY AUTOMATED COUNT: 14.2 % (ref 10–15)
ERYTHROCYTE [SEDIMENTATION RATE] IN BLOOD BY WESTERGREN METHOD: 60 MM/H (ref 0–30)
HCT VFR BLD AUTO: 42.4 % (ref 35–47)
HGB BLD-MCNC: 14.1 G/DL (ref 11.7–15.7)
IMM GRANULOCYTES # BLD: 0 10E9/L (ref 0–0.4)
IMM GRANULOCYTES NFR BLD: 0.2 %
IRON SATN MFR SERPL: 59 % (ref 15–46)
IRON SERPL-MCNC: 142 UG/DL (ref 35–180)
LYMPHOCYTES # BLD AUTO: 0.6 10E9/L (ref 0.8–5.3)
LYMPHOCYTES NFR BLD AUTO: 15.6 %
MCH RBC QN AUTO: 35.5 PG (ref 26.5–33)
MCHC RBC AUTO-ENTMCNC: 33.3 G/DL (ref 31.5–36.5)
MCV RBC AUTO: 107 FL (ref 78–100)
MONOCYTES # BLD AUTO: 0.4 10E9/L (ref 0–1.3)
MONOCYTES NFR BLD AUTO: 9.9 %
NEUTROPHILS # BLD AUTO: 2.8 10E9/L (ref 1.6–8.3)
NEUTROPHILS NFR BLD AUTO: 69.6 %
NRBC # BLD AUTO: 0 10*3/UL
NRBC BLD AUTO-RTO: 0 /100
PLATELET # BLD AUTO: 113 10E9/L (ref 150–450)
PROT SERPL-MCNC: 9.6 G/DL (ref 6.8–8.8)
RBC # BLD AUTO: 3.97 10E12/L (ref 3.8–5.2)
TIBC SERPL-MCNC: 242 UG/DL (ref 240–430)
VIT B12 SERPL-MCNC: 1297 PG/ML (ref 193–986)
WBC # BLD AUTO: 4.1 10E9/L (ref 4–11)

## 2019-11-12 ASSESSMENT — ENCOUNTER SYMPTOMS
HALLUCINATIONS: 0
DIARRHEA: 1
WEIGHT GAIN: 0
BLOATING: 0
CONSTIPATION: 1
WEIGHT LOSS: 1
BACK PAIN: 0
MYALGIAS: 0
NIGHT SWEATS: 0
DECREASED APPETITE: 0
ABDOMINAL PAIN: 0
MUSCLE CRAMPS: 0
NAUSEA: 0
HEARTBURN: 0
NECK PAIN: 0
FEVER: 0
CHILLS: 0
INCREASED ENERGY: 0
VOMITING: 0
POLYDIPSIA: 0
POLYPHAGIA: 0
FATIGUE: 1
JOINT SWELLING: 0
ARTHRALGIAS: 1
ALTERED TEMPERATURE REGULATION: 1

## 2019-11-12 ASSESSMENT — PAIN SCALES - GENERAL: PAINLEVEL: NO PAIN (0)

## 2019-11-12 ASSESSMENT — MIFFLIN-ST. JEOR: SCORE: 1333.88

## 2019-11-12 NOTE — NURSING NOTE
"Chief Complaint   Patient presents with     New Patient     New consult       Vitals:    11/12/19 1529   BP: 115/81   Pulse: 113   Temp: 99.1  F (37.3  C)   TempSrc: Oral   SpO2: 99%   Weight: 72.2 kg (159 lb 3.2 oz)   Height: 1.676 m (5' 6\")       Body mass index is 25.7 kg/m .    Lorin Mcgrath CMA    "

## 2019-11-12 NOTE — PATIENT INSTRUCTIONS
Nice to meet you today      Referral to hepatology      Schedule colonoscopy and egd with Dr. Schilling  You are scheduled on December 5  Check in time 730   You will need a pre op before the procedure  You will be called by a pre assessment nurse about one week to go over instructions and send a prep    [facility]   [address]       Ensure boost carnation instant breakfast   Multivitamin       Labs today  Lab tests today at the 1st floor -- you will be notified of results by letter or my chart message in 7-10 days.  You will receive a phone call if more urgent follow up is needed.        Work to switch your remicade over to [employer] system  Due on November 27       Follow up in 8 weeks  With Dr. Schilling        Thanks Diane Mckay RN Care Coordinator for Dr. Schilling  Phone   [phone]       For questions regarding your care Monday through Friday, contact the RN GI care coordinator,  Call   [phone] . Your call will be  returned same day, or if consultation is needed with the provider, it may be following business day - or you may send a My Chart message.    For medication refills (prescribed by the GI clinic), contact your pharmacy.    For appointment rescheduling/cancellation, contact [phone]     After hours, or if you have an immediate GI concern and cannot wait for a return call, contact the GI Fellow at [phone] and select option #4.

## 2019-11-12 NOTE — PROGRESS NOTES
IBD CLINIC VISIT    CC/REFERRING MD:  Referred Self  REASON FOR CONSULTATION: Crohn's.     ASSESSMENT/PLAN:  54 year old female with cirrhosis and Crohn's    1.  Crohn's disease: Mostly perianal fistula.  However concern from prior colonoscopy that what was thought to be portal hypertensive colopathy may be Crohn's. HBI technically 8.  Biopsies were previously not taken due to thrombocytopenia.  She is due for restaging this time.  I am concerned that she had no detectable infliximab and positive antibodies.  Unfortunately the antibiotic that she had is remarkably unreliable and so I am not convinced that she does or does not have anti-infliximab antibodies.  We will repeat antibody testing now as she is 6 weeks past her infusion.  If she does not have antibodies likely plan to increase infliximab and add azathioprine.  If she does have antibodies will be to change her to adalimumab with azathioprine.  -- Colonoscopy   -- Repeat infliximab concentration and antibody testing  -- TPMT in anticipation of starting azathioprine or 6-MP  -- Labs today including B-12, iron, CBC, LFTs  -- IBD pharmacy consult for thiopurine therapy in cirrhosis  -- Likely colorectal consult in future, however will optimize medically first.     2. Cirrhosis: Some notes state cryptogenic, some notes state alcoholic.  I did not push for etiology at this time.  She is due for an upper endoscopy to screen for varices.  She needs to establish care with a liver program here at the [facility].  -- EGD  -- Liver program referral placed today    3. Cholecystitis: Recent admit for cholecystitis.  Treated medically due to cirrhosis that was decompensated.  Will discuss with liver team best way to manage cholecystitis in the setting of cirrhosis.    IBD HISTORY  Age at diagnosis: 53 (4/2019)  Extent of disease: miguel angel-anal, anal  Disease phenotype: Miguel Angel-anal fistula  Miguel Angel-anal disease: Yes  Current CD medications:  - Infliximab - started May  2019 (5mg/kg every 8 weeks)  Prior IBD surgeries: No  Prior IBD Medications:    DRUG MONITORING  TPMT enzyme activity:     6-TGN/6-MMPN levels:    Biologic concentration:  10/2/2019: IFX 0.8, anti-IFX antibodies: 451 (Esoterix)    DISEASE ASSESSMENT  Labs  No lab results found.    Invalid input(s):  ALB,  HGB  Fecal calprotectin: --  Endoscopy: --  Enterography: --  C diff: --    sIBDQ:   No flowsheet data found.    IBD Health Care Maintenance:    Vaccinations:  All patients on biologics should avoid live vaccines.    -- Influenza (every year)  -- TdaP (every 10 years)  -- Pneumococcal Pneumonia (once plus booster at 5 years)  -- Yearly assessment for latent Tb (verbal screening and exam, PPD or QuantiFERON-Tb testing)    One time confirmation of immunity or serologies:  -- Hepatitis A (serologies or immunizations)  -- Hepatitis B (serologies or immunizations)  -- Varicella  -- MMR  -- HPV (all aged 18-26)  -- Meningococcal meningitis (all patients at risk for meningitis)  -- Due to the immunosuppression in this patient, I would not advise administration of live vaccines such as varicella/VZV, intranasal influenza, MMR, or yellow fever vaccine (if travelling).      Bone mineral density screening   -- Recommend all patients supplement with calcium and vitamin D  -- Consider DEXA if not already done    Cancer Screening:  Colon cancer screening:  Unclear colonic extent of disease at this time.      Cervical cancer screening: Per OBGYN    Skin cancer screening: Annual visual exam of skin by dermatologist since patient is immunocompromised    Depression Screening:  PHQ-2 Score:     PHQ-2 ( 1999 Pfizer) 7/23/2019 8/3/2016   Q1: Little interest or pleasure in doing things 0 0   Q2: Feeling down, depressed or hopeless 0 0   PHQ-2 Score 0 0   Q1: Little interest or pleasure in doing things - -   Q2: Feeling down, depressed or hopeless - -   PHQ-2 Score - -     Misc:  -- Avoid tobacco use  -- Avoid NSAIDs as there is  potentially a 25% chance of causing an IBD flare    Return to clinic in 8 weeks    Thank you for this consultation.  It was a pleasure to participate in the care of this patient; please contact us with any further questions.     This note was created with voice recognition software, and while reviewed for accuracy, typos may remain.     Farhan Schilling MD   of Medicine  Division of Gastroenterology, Hepatology and Nutrition  [facility]      HPI:   Patient here today to establish care at the [facility] IBD program as well as to consolidate her liver and other medical care here.  She is a history of long-standing diarrhea as well as recurrent aphthous ulcers.  In April 2019 she was diagnosed with Crohn's disease due to perianal fistula.  She started infliximab due to perianal disease, and per colorectal surgery note had may be a 30% improvement in fistula output.  Per patient she feels like she has had no improvement.    Currently, about 2 1/2 week out from infliximab infusion, joint aches return and apthous uclers return in mouth. She has constant diarrhea, which does not improve.     She reports 3-10 stools a day.  She takes occasional anti-diarrheal, but not in the past week.  Her stools are rarely formed.  Non-bloody.      She was let go from work in January.  But due to diarrhea, unclear if she can get through job interview.     Recently diagnosed with cholecystitis - treated medically due to liver numbers.      HBI:  Overall patient well being (prior day): 1 (Slightly below par)  Abdominal pain (prior day): 0 (None)  Number of liquid or soft stools (prior day): 4 (1 point per stool)  Abdominal mass on exam: 0 (None)  Complications (1 point for each):   Arthralgia, Apthous ulcers and New fistula      ROS:    No fevers or chills  No weight loss  No blurry vision, double vision or change in vision  No sore throat  No lymphadenopathy  No headache, paraesthesias, or weakness in  a limb  No shortness of breath or wheezing  No chest pain or pressure  No arthralgias or myalgias  No rashes or skin changes  No odynophagia or dysphagia  No BRBPR, hematochezia, melena  No dysuria, frequency or urgency  No hot/cold intolerance or polyria  No anxiety or depression    Extra intestinal manifestations of IBD:  No uveitis/episcleritis  + aphthous ulcers   + arthritis   No erythema nodosum/pyoderma gangrenosum.     PERTINENT PAST MEDICAL HISTORY:  Past Medical History:   Diagnosis Date     Atypical ductal hyperplasia of breast 9/10/10    ERT not recommended -left - and flat epithelial atypia-scheduled for breast biopsy 9/17/2010      BIFID UVULA (aka UVULA)       Contact dermatitis and other eczema, due to unspecified cause     perianal  - recurrent - clobetasol      Hypertension      IBS (irritable bowel syndrome)      Obesity, unspecified      Other isolated or specific phobias     fear of flying - gets Ativan prn.        PREVIOUS SURGERIES:  Past Surgical History:   Procedure Laterality Date     BIOPSY ANAL N/A 3/28/2019    anal biopsy and culure placement of seton - Dr Fleming     BREAST BIOPSY, RT/LT  9/17/2010    left - scheduled with Dr. Kojo WILKERSON APPENDECTOMY  at age 14     COLONOSCOPY  2006     COLONOSCOPY N/A 12/23/2014    Procedure: COMBINED COLONOSCOPY, SINGLE OR MULTIPLE BIOPSY/POLYPECTOMY BY BIOPSY;  Surgeon: Diane Fleming MD;  Location:  GI     EXAM UNDER ANESTHESIA ANUS N/A 3/28/2019    Procedure: EXAM UNDER ANESTHESIA ANUS;  Surgeon: Diane Fleming MD;  Location:  OR      CLOSED TX BIMALLEOLAR ANKLE FX W/O MANIPULATION  at age 27    left ankle ORIF, plates and screws removed at age 36     HYSTERECTOMY, VAGINAL  2006    with Dr. Licha Zhou - with BSO for fibroids      SURGICAL HISTORY OF -   4/15/2010    Pelviscopy with removal of bilateral hydrosalpinges.        PREVIOUS ENDOSCOPY:  3/7/19: Flex sig: Colon and rectum appeared normal. 2 large deep ulcers  extending from the anal cnaal to left perianal area.     10/23/19: Colonoscopy: Endoscopically normal ileum.  Mild pan colonic congestion with contact friability thought to be related to portal hypertension and thrombocytopenia.  Prior anal verge ulcer is healed    12/23/14: Colonoscopy: Perianal skin tag noted.  Ileum normal, colon normal.    ALLERGIES:     Allergies   Allergen Reactions     Fish Oil      Redness and itching around eye area only - went away when fish oil capsules stopped      Metronidazole      pain/itching     Naphthalenemethylamines      Lamisil = mild urticarial reaction     Penicillins Cramps     Vomiting     Ppd [Tuberculin Purified Protein Derivative]      Sulfa Drugs      hives       PERTINENT MEDICATIONS:    Current Outpatient Medications:      amoxicillin-clavulanate (AUGMENTIN) 875-125 MG tablet, Take 1 tablet by mouth 2 times daily, Disp: 14 tablet, Rfl: 0     cinnamon 500 MG CAPS, Take 1 capsule by mouth daily, Disp: , Rfl:      furosemide (LASIX) 40 MG tablet, Take 40 mg by mouth daily , Disp: , Rfl:      Milk Thistle-Dand-Fennel-Licor (MILK THISTLE XTRA) CAPS capsule, Take 1 capsule by mouth daily, Disp: , Rfl:      Multiple Vitamins-Minerals (MULTIVITAMIN & MINERAL PO), Take  by mouth daily., Disp: , Rfl:      spironolactone (ALDACTONE) 100 MG tablet, Take 100 mg by mouth daily , Disp: , Rfl:      Turmeric 500 MG TABS, Take 1 capsule by mouth daily, Disp: , Rfl:      lisinopril (PRINIVIL/ZESTRIL) 10 MG tablet, Take 1 tablet (10 mg) by mouth daily (Patient not taking: Reported on 11/12/2019), Disp: 90 tablet, Rfl: 3    SOCIAL HISTORY:  Social History     Socioeconomic History     Marital status:      Spouse name: Albino     Number of children: 3     Years of education: 14     Highest education level: Not on file   Occupational History     Occupation: [employer]     Comment:    Social Needs     Financial resource strain: Not on file     Food  insecurity:     Worry: Not on file     Inability: Not on file     Transportation needs:     Medical: Not on file     Non-medical: Not on file   Tobacco Use     Smoking status: Never Smoker     Smokeless tobacco: Never Used   Substance and Sexual Activity     Alcohol use: Yes     Alcohol/week: 0.0 standard drinks     Comment: occasional     Drug use: No     Comment: no herbal meds either     Sexual activity: Yes     Partners: Male     Birth control/protection: Surgical     Comment: harper had vasectomy   Lifestyle     Physical activity:     Days per week: Not on file     Minutes per session: Not on file     Stress: Not on file   Relationships     Social connections:     Talks on phone: Not on file     Gets together: Not on file     Attends Voodoo service: Not on file     Active member of club or organization: Not on file     Attends meetings of clubs or organizations: Not on file     Relationship status: Not on file     Intimate partner violence:     Fear of current or ex partner: Not on file     Emotionally abused: Not on file     Physically abused: Not on file     Forced sexual activity: Not on file   Other Topics Concern      Service No     Blood Transfusions No     Caffeine Concern No     Comment: rarely drinks caffeine     Occupational Exposure Not Asked     Hobby Hazards Not Asked     Sleep Concern Not Asked     Stress Concern Not Asked     Weight Concern Not Asked     Special Diet Not Asked     Back Care Not Asked     Exercise Yes     Comment: does a lot of walking - 4x/week     Bike Helmet Not Asked     Seat Belt Yes     Comment: always     Self-Exams Yes     Comment: SBE encouraged monthly     Parent/sibling w/ CABG, MI or angioplasty before 65F 55M? Yes   Social History Narrative    calcium - drinks 5-6 large glasses skim milk/day    flex sig/colonoscopy -at age 49    sun precautions - discussed    mammogram - needs every 2 years in her 30's, then yearly from then on    Td booster - 9/99 and  "4/27/2010    pneumovax -at age 59    DEXA -when perimenopausal    stool hemoccults - every year after age 40    ASA- start at age 39    mulvitamin - encouraged       FAMILY HISTORY:  Family History   Problem Relation Age of Onset     Breast Cancer Mother      Gastrointestinal Disease Mother      Heart Disease Father 57     Heart Disease Paternal Grandfather      Hypertension Maternal Grandmother      Diabetes Paternal Grandmother      Diabetes Maternal Grandfather      Cancer Sister        Past/family/social history reviewed and no changes    PHYSICAL EXAMINATION:  Constitutional: aaox3, cooperative, pleasant, not dyspneic/diaphoretic, no acute distress  Vitals reviewed: /81   Pulse 113   Temp 99.1  F (37.3  C) (Oral)   Ht 1.676 m (5' 6\")   Wt 72.2 kg (159 lb 3.2 oz)   LMP 05/31/2006   SpO2 99%   BMI 25.70 kg/m    Wt:   Wt Readings from Last 2 Encounters:   11/12/19 72.2 kg (159 lb 3.2 oz)   10/22/19 80.3 kg (177 lb 1.6 oz)      Eyes: Sclera anicteric/injected  Ears/nose/mouth/throat: Normal oropharynx without ulcers or exudate, mucus membranes moist, hearing intact  Neck: supple, thyroid normal size  CV: No edema  Respiratory: Unlabored breathing  Lymph: No axillary, submandibular, supraclavicular or inguinal lymphadenopathy  Abd: Nondistended, +bs, no hepatosplenomegaly, nontender, no peritoneal signs  Skin: warm, perfused, no jaundice  Psych: Normal affect  MSK: Normal gait      PERTINENT STUDIES:  Most recent CBC:  Recent Labs   Lab Test 10/23/19  1019 10/23/19  0626   WBC 3.0* 2.7*   HGB 11.9 10.4*   HCT 36.2 31.7*   * 72*     Most recent hepatic panel:  Recent Labs   Lab Test 10/23/19  1019 10/23/19  0626   ALT 35 29   AST 73* 62*     Most recent creatinine:  Recent Labs   Lab Test 10/23/19  1019 10/23/19  0626   CR 0.59 0.52            Answers for HPI/ROS submitted by the patient on 11/12/2019   General Symptoms: Yes  Skin Symptoms: No  HENT Symptoms: No  EYE SYMPTOMS: No  HEART SYMPTOMS: " No  LUNG SYMPTOMS: No  INTESTINAL SYMPTOMS: Yes  URINARY SYMPTOMS: No  GYNECOLOGIC SYMPTOMS: No  BREAST SYMPTOMS: No  SKELETAL SYMPTOMS: Yes  BLOOD SYMPTOMS: No  NERVOUS SYSTEM SYMPTOMS: No  MENTAL HEALTH SYMPTOMS: No  Fever: No  Loss of appetite: No  Weight loss: Yes  Weight gain: No  Fatigue: Yes  Night sweats: No  Chills: No  Increased stress: Yes  Excessive hunger: No  Excessive thirst: No  Feeling hot or cold when others believe the temperature is normal: Yes  Loss of height: No  Post-operative complications: No  Surgical site pain: No  Hallucinations: No  Change in or Loss of Energy: No  Heart burn or indigestion: No  Nausea: No  Vomiting: No  Abdominal pain: No  Bloating: No  Constipation: Yes  Diarrhea: Yes  Back pain: No  Muscle aches: No  Neck pain: No  Swollen joints: No  Joint pain: Yes  Bone pain: No  Muscle cramps: No

## 2019-11-12 NOTE — LETTER
11/12/2019       RE: Abiola Matute  [address] [place] [address]     Dear Colleague,    Thank you for referring your patient, Abiola Matute, to the [facility] GASTROENTEROLOGY AND IBD CLINIC at [facility]. Please see a copy of my visit note below.    IBD CLINIC VISIT    CC/REFERRING MD:  Referred Self  REASON FOR CONSULTATION: Crohn's.     ASSESSMENT/PLAN:  54 year old female with cirrhosis and Crohn's    1.  Crohn's disease: Mostly perianal fistula.  However concern from prior colonoscopy that what was thought to be portal hypertensive colopathy may be Crohn's. HBI technically 8.  Biopsies were previously not taken due to thrombocytopenia.  She is due for restaging this time.  I am concerned that she had no detectable infliximab and positive antibodies.  Unfortunately the antibiotic that she had is remarkably unreliable and so I am not convinced that she does or does not have anti-infliximab antibodies.  We will repeat antibody testing now as she is 6 weeks past her infusion.  If she does not have antibodies likely plan to increase infliximab and add azathioprine.  If she does have antibodies will be to change her to adalimumab with azathioprine.  -- Colonoscopy   -- Repeat infliximab concentration and antibody testing  -- TPMT in anticipation of starting azathioprine or 6-MP  -- Labs today including B-12, iron, CBC, LFTs  -- IBD pharmacy consult for thiopurine therapy in cirrhosis  -- Likely colorectal consult in future, however will optimize medically first.     2. Cirrhosis: Some notes state cryptogenic, some notes state alcoholic.  I did not push for etiology at this time.  She is due for an upper endoscopy to screen for varices.  She needs to establish care with a liver program here at the [facility].  -- EGD  -- Liver program referral placed today    3. Cholecystitis: Recent admit for cholecystitis.  Treated medically due to cirrhosis that  was decompensated.  Will discuss with liver team best way to manage cholecystitis in the setting of cirrhosis.    IBD HISTORY  Age at diagnosis: 53 (4/2019)  Extent of disease: miguel angel-anal, anal  Disease phenotype: Miguel Angel-anal fistula  Miguel Angel-anal disease: Yes  Current CD medications:  - Infliximab - started May 2019 (5mg/kg every 8 weeks)  Prior IBD surgeries: No  Prior IBD Medications:    DRUG MONITORING  TPMT enzyme activity:     6-TGN/6-MMPN levels:    Biologic concentration:  10/2/2019: IFX 0.8, anti-IFX antibodies: 451 (Esoterix)    DISEASE ASSESSMENT  Labs  No lab results found.    Invalid input(s):  ALB,  HGB  Fecal calprotectin: --  Endoscopy: --  Enterography: --  C diff: --    sIBDQ:   No flowsheet data found.    IBD Health Care Maintenance:    Vaccinations:  All patients on biologics should avoid live vaccines.    -- Influenza (every year)  -- TdaP (every 10 years)  -- Pneumococcal Pneumonia (once plus booster at 5 years)  -- Yearly assessment for latent Tb (verbal screening and exam, PPD or QuantiFERON-Tb testing)    One time confirmation of immunity or serologies:  -- Hepatitis A (serologies or immunizations)  -- Hepatitis B (serologies or immunizations)  -- Varicella  -- MMR  -- HPV (all aged 18-26)  -- Meningococcal meningitis (all patients at risk for meningitis)  -- Due to the immunosuppression in this patient, I would not advise administration of live vaccines such as varicella/VZV, intranasal influenza, MMR, or yellow fever vaccine (if travelling).      Bone mineral density screening   -- Recommend all patients supplement with calcium and vitamin D  -- Consider DEXA if not already done    Cancer Screening:  Colon cancer screening:  Unclear colonic extent of disease at this time.      Cervical cancer screening: Per OBGYN    Skin cancer screening: Annual visual exam of skin by dermatologist since patient is immunocompromised    Depression Screening:  PHQ-2 Score:     PHQ-2 ( 1999 Pfizer) 7/23/2019  8/3/2016   Q1: Little interest or pleasure in doing things 0 0   Q2: Feeling down, depressed or hopeless 0 0   PHQ-2 Score 0 0   Q1: Little interest or pleasure in doing things - -   Q2: Feeling down, depressed or hopeless - -   PHQ-2 Score - -     Misc:  -- Avoid tobacco use  -- Avoid NSAIDs as there is potentially a 25% chance of causing an IBD flare    Return to clinic in 8 weeks    Thank you for this consultation.  It was a pleasure to participate in the care of this patient; please contact us with any further questions.  I spent a total of 45 minutes, face to face, was spent with this patient, >50% of which was counseling regarding the above delineated issues.    This note was created with voice recognition software, and while reviewed for accuracy, typos may remain.     Farhan Schilling MD   of Medicine  Division of Gastroenterology, Hepatology and Nutrition  [facility]      HPI:   Patient here today to establish care at the [facility] IBD program as well as to consolidate her liver and other medical care here.  She is a history of long-standing diarrhea as well as recurrent aphthous ulcers.  In April 2019 she was diagnosed with Crohn's disease due to perianal fistula.  She started infliximab due to perianal disease, and per colorectal surgery note had may be a 30% improvement in fistula output.  Per patient she feels like she has had no improvement.    Currently, about 2 1/2 week out from infliximab infusion, joint aches return and apthous uclers return in mouth. She has constant diarrhea, which does not improve.     She reports 3-10 stools a day.  She takes occasional anti-diarrheal, but not in the past week.  Her stools are rarely formed.  Non-bloody.      She was let go from work in January.  But due to diarrhea, unclear if she can get through job interview.     Recently diagnosed with cholecystitis - treated medically due to liver numbers.      HBI:  Overall patient  well being (prior day): 1 (Slightly below par)  Abdominal pain (prior day): 0 (None)  Number of liquid or soft stools (prior day): 4 (1 point per stool)  Abdominal mass on exam: 0 (None)  Complications (1 point for each):   Arthralgia, Apthous ulcers and New fistula      ROS:    No fevers or chills  No weight loss  No blurry vision, double vision or change in vision  No sore throat  No lymphadenopathy  No headache, paraesthesias, or weakness in a limb  No shortness of breath or wheezing  No chest pain or pressure  No arthralgias or myalgias  No rashes or skin changes  No odynophagia or dysphagia  No BRBPR, hematochezia, melena  No dysuria, frequency or urgency  No hot/cold intolerance or polyria  No anxiety or depression    Extra intestinal manifestations of IBD:  No uveitis/episcleritis  + aphthous ulcers   + arthritis   No erythema nodosum/pyoderma gangrenosum.     PERTINENT PAST MEDICAL HISTORY:  Past Medical History:   Diagnosis Date     Atypical ductal hyperplasia of breast 9/10/10    ERT not recommended -left - and flat epithelial atypia-scheduled for breast biopsy 9/17/2010      BIFID UVULA (aka UVULA)       Contact dermatitis and other eczema, due to unspecified cause     perianal  - recurrent - clobetasol      Hypertension      IBS (irritable bowel syndrome)      Obesity, unspecified      Other isolated or specific phobias     fear of flying - gets Ativan prn.        PREVIOUS SURGERIES:  Past Surgical History:   Procedure Laterality Date     BIOPSY ANAL N/A 3/28/2019    anal biopsy and culure placement of seton - Dr Fleming     BREAST BIOPSY, RT/LT  9/17/2010    left - scheduled with Dr. Kojo WILKERSON APPENDECTOMY  at age 14     COLONOSCOPY  2006     COLONOSCOPY N/A 12/23/2014    Procedure: COMBINED COLONOSCOPY, SINGLE OR MULTIPLE BIOPSY/POLYPECTOMY BY BIOPSY;  Surgeon: Diane Fleming MD;  Location:  GI     EXAM UNDER ANESTHESIA ANUS N/A 3/28/2019    Procedure: EXAM UNDER ANESTHESIA ANUS;   Surgeon: Diane Fleming MD;  Location: [facility] OR     HC CLOSED TX BIMALLEOLAR ANKLE FX W/O MANIPULATION  at age 27    left ankle ORIF, plates and screws removed at age 36     HYSTERECTOMY, VAGINAL  2006    with Dr. Licha Zhou - with BSO for fibroids      SURGICAL HISTORY OF -   4/15/2010    Pelviscopy with removal of bilateral hydrosalpinges.        PREVIOUS ENDOSCOPY:  3/7/19: Flex sig: Colon and rectum appeared normal. 2 large deep ulcers extending from the anal cnaal to left perianal area.     10/23/19: Colonoscopy: Endoscopically normal ileum.  Mild pan colonic congestion with contact friability thought to be related to portal hypertension and thrombocytopenia.  Prior anal verge ulcer is healed    12/23/14: Colonoscopy: Perianal skin tag noted.  Ileum normal, colon normal.    ALLERGIES:     Allergies   Allergen Reactions     Fish Oil      Redness and itching around eye area only - went away when fish oil capsules stopped      Metronidazole      pain/itching     Naphthalenemethylamines      Lamisil = mild urticarial reaction     Penicillins Cramps     Vomiting     Ppd [Tuberculin Purified Protein Derivative]      Sulfa Drugs      hives       PERTINENT MEDICATIONS:    Current Outpatient Medications:      amoxicillin-clavulanate (AUGMENTIN) 875-125 MG tablet, Take 1 tablet by mouth 2 times daily, Disp: 14 tablet, Rfl: 0     cinnamon 500 MG CAPS, Take 1 capsule by mouth daily, Disp: , Rfl:      furosemide (LASIX) 40 MG tablet, Take 40 mg by mouth daily , Disp: , Rfl:      Milk Thistle-Dand-Fennel-Licor (MILK THISTLE XTRA) CAPS capsule, Take 1 capsule by mouth daily, Disp: , Rfl:      Multiple Vitamins-Minerals (MULTIVITAMIN & MINERAL PO), Take  by mouth daily., Disp: , Rfl:      spironolactone (ALDACTONE) 100 MG tablet, Take 100 mg by mouth daily , Disp: , Rfl:      Turmeric 500 MG TABS, Take 1 capsule by mouth daily, Disp: , Rfl:      lisinopril (PRINIVIL/ZESTRIL) 10 MG tablet, Take 1 tablet (10 mg) by mouth  daily (Patient not taking: Reported on 11/12/2019), Disp: 90 tablet, Rfl: 3    SOCIAL HISTORY:  Social History     Socioeconomic History     Marital status:      Spouse name: Albino     Number of children: 3     Years of education: 14     Highest education level: Not on file   Occupational History     Occupation: [employer]     Comment:    Social Needs     Financial resource strain: Not on file     Food insecurity:     Worry: Not on file     Inability: Not on file     Transportation needs:     Medical: Not on file     Non-medical: Not on file   Tobacco Use     Smoking status: Never Smoker     Smokeless tobacco: Never Used   Substance and Sexual Activity     Alcohol use: Yes     Alcohol/week: 0.0 standard drinks     Comment: occasional     Drug use: No     Comment: no herbal meds either     Sexual activity: Yes     Partners: Male     Birth control/protection: Surgical     Comment: harper had vasectomy   Lifestyle     Physical activity:     Days per week: Not on file     Minutes per session: Not on file     Stress: Not on file   Relationships     Social connections:     Talks on phone: Not on file     Gets together: Not on file     Attends Sabianism service: Not on file     Active member of club or organization: Not on file     Attends meetings of clubs or organizations: Not on file     Relationship status: Not on file     Intimate partner violence:     Fear of current or ex partner: Not on file     Emotionally abused: Not on file     Physically abused: Not on file     Forced sexual activity: Not on file   Other Topics Concern      Service No     Blood Transfusions No     Caffeine Concern No     Comment: rarely drinks caffeine     Occupational Exposure Not Asked     Hobby Hazards Not Asked     Sleep Concern Not Asked     Stress Concern Not Asked     Weight Concern Not Asked     Special Diet Not Asked     Back Care Not Asked     Exercise Yes     Comment: does a lot of walking -  "4x/week     Bike Helmet Not Asked     Seat Belt Yes     Comment: always     Self-Exams Yes     Comment: SBE encouraged monthly     Parent/sibling w/ CABG, MI or angioplasty before 65F 55M? Yes   Social History Narrative    calcium - drinks 5-6 large glasses skim milk/day    flex sig/colonoscopy -at age 49    sun precautions - discussed    mammogram - needs every 2 years in her 30's, then yearly from then on    Td booster - 9/99 and 4/27/2010    pneumovax -at age 59    DEXA -when perimenopausal    stool hemoccults - every year after age 40    ASA- start at age 39    mulvitamin - encouraged       FAMILY HISTORY:  Family History   Problem Relation Age of Onset     Breast Cancer Mother      Gastrointestinal Disease Mother      Heart Disease Father 57     Heart Disease Paternal Grandfather      Hypertension Maternal Grandmother      Diabetes Paternal Grandmother      Diabetes Maternal Grandfather      Cancer Sister        Past/family/social history reviewed and no changes    PHYSICAL EXAMINATION:  Constitutional: aaox3, cooperative, pleasant, not dyspneic/diaphoretic, no acute distress  Vitals reviewed: /81   Pulse 113   Temp 99.1  F (37.3  C) (Oral)   Ht 1.676 m (5' 6\")   Wt 72.2 kg (159 lb 3.2 oz)   LMP 05/31/2006   SpO2 99%   BMI 25.70 kg/m     Wt:   Wt Readings from Last 2 Encounters:   11/12/19 72.2 kg (159 lb 3.2 oz)   10/22/19 80.3 kg (177 lb 1.6 oz)      Eyes: Sclera anicteric/injected  Ears/nose/mouth/throat: Normal oropharynx without ulcers or exudate, mucus membranes moist, hearing intact  Neck: supple, thyroid normal size  CV: No edema  Respiratory: Unlabored breathing  Lymph: No axillary, submandibular, supraclavicular or inguinal lymphadenopathy  Abd: Nondistended, +bs, no hepatosplenomegaly, nontender, no peritoneal signs  Skin: warm, perfused, no jaundice  Psych: Normal affect  MSK: Normal gait      PERTINENT STUDIES:  Most recent CBC:  Recent Labs   Lab Test 10/23/19  1019 10/23/19  0626 "   WBC 3.0* 2.7*   HGB 11.9 10.4*   HCT 36.2 31.7*   * 72*     Most recent hepatic panel:  Recent Labs   Lab Test 10/23/19  1019 10/23/19  0626   ALT 35 29   AST 73* 62*     Most recent creatinine:  Recent Labs   Lab Test 10/23/19  1019 10/23/19  0626   CR 0.59 0.52       Again, thank you for allowing me to participate in the care of your patient.      Sincerely,    Farhan Schilling MD

## 2019-11-13 ENCOUNTER — PATIENT OUTREACH (OUTPATIENT)
Dept: GASTROENTEROLOGY | Facility: CLINIC | Age: 54
End: 2019-11-13

## 2019-11-13 ENCOUNTER — MEDICAL CORRESPONDENCE (OUTPATIENT)
Dept: HEALTH INFORMATION MANAGEMENT | Facility: CLINIC | Age: 54
End: 2019-11-13

## 2019-11-13 DIAGNOSIS — K50.90 CROHN'S DISEASE (H): Primary | ICD-10-CM

## 2019-11-13 RX ORDER — ACETAMINOPHEN 325 MG/1
650 TABLET ORAL ONCE
Status: CANCELLED
Start: 2019-11-13

## 2019-11-13 RX ORDER — DIPHENHYDRAMINE HCL 25 MG
25 CAPSULE ORAL ONCE
Status: CANCELLED
Start: 2019-11-13

## 2019-11-13 NOTE — TELEPHONE ENCOUNTER
Remicade therapy plan entered. Standing lab orders placed  . To be drawn day of remicade infusion prior to start of infusion every other infusion   Pt due November 27.   Awaiting infliximab level to be sure no antibodies and check level    In basket message to prior team.   [facility]

## 2019-11-13 NOTE — TELEPHONE ENCOUNTER
abbieand  RECORDS RECEIVED FROM: Internal - Cirrhosis of liver//referred by Farhan Schilling MD//med recs in [facility]//scheduled by patient//   DATE RECEIVED: 11.26.2019   NOTES STATUS DETAILS   OFFICE NOTE from referring provider Internal 11.12.2019 Consult   OFFICE NOTES from other specialists N/A    DISCHARGE SUMMARY from hospital Internal 10.22.2019  10.21.2019   MEDICATION LIST Internal / CE    LIVER BIOSPY (IF APPLICABLE)      PATHOLOGY REPORTS  Care Everywhere  04.24.2019 [facility]    IMAGING     ENDOSCOPY (IF AVAILABLE) N/A    COLONOSCOPY (IF AVAILABLE) Received 10.23.2018   ULTRASOUND LIVER Internal 10.21.2019  10.09.2019   CT OF ABDOMEN Internal 02.22.2019   MRI OF LIVER N/A    FIBROSCAN, US ELASTOGRAPHY, FIBROSIS SCAN, MR ELASTOGRAPHY N/A    LABS     HEPATIC PANEL (LIVER PANEL) Internal 11.13.2019 11.12.2019     BASIC METABOLIC PANEL Internal 10.23.2019   COMPLETE METABOLIC PANEL N/A    COMPLETE BLOOD COUNT (CBC) Internal 11.13.2019 11.12.2019   INTERNATIONAL NORMALIZED RATIO (INR) Internal 10.23.2019   HEPATITIS C ANTIBODY N/A    HEPATITIS C VIRAL LOAD/PCR N/A    HEPATITIS C GENOTYPE N/A    HEPATITIS B SURFACE ANTIGEN N/A    HEPATITIS B SURFACE ANTIBODY N/A    HEPATITIS B DNA QUANT LEVEL N/A    HEPATITIS B CORE ANTIBODY N/A      Action    Action Taken 11.13.2019 Called Devan at [phone] to request image spoke to rep who will be pushing it over.    11.13.2019 Image received.

## 2019-11-15 DIAGNOSIS — K74.60 CIRRHOSIS OF LIVER (H): Primary | ICD-10-CM

## 2019-11-18 ENCOUNTER — PATIENT OUTREACH (OUTPATIENT)
Dept: GASTROENTEROLOGY | Facility: CLINIC | Age: 54
End: 2019-11-18

## 2019-11-18 NOTE — PROGRESS NOTES
Pt returned call from pt. Pt has complaints of more sores in her mouth and joint pain. Results from remicade level and sent to Dr. Schilling. advised pt to take warm baths. Avoid NSAIDS.  Pt aware that her infusion is approved.

## 2019-11-18 NOTE — PROGRESS NOTES
Contacted pt to let her know that one dose for remicade approved at [facility]. Left the number to call to schedule appt and also instructed not to cancel one at [facility] until we know for sure that she can get an appt at [facility]. Then will work on the next infusion that will need to be at outpatient setting.  Left my name and number.         Insurance has approved ONE DOSE of Remicade. Let me know if you want me to have [facility] start the PA process for her subsequent doses.       Sanjuanita Casillas    Infusion  - [facility]

## 2019-11-19 LAB — LAB SCANNED RESULT: NORMAL

## 2019-11-21 ENCOUNTER — PATIENT OUTREACH (OUTPATIENT)
Dept: GASTROENTEROLOGY | Facility: CLINIC | Age: 54
End: 2019-11-21

## 2019-11-21 ENCOUNTER — PRE VISIT (OUTPATIENT)
Dept: GASTROENTEROLOGY | Facility: CLINIC | Age: 54
End: 2019-11-21

## 2019-11-21 NOTE — PROGRESS NOTES
Notified prior team for urgent prior want to increase dosing to 10/mg/kg   Edited therapy plan  Pt scheduled on November 27   Left a message for pt that working on increase dosing.                  Yes.    Previous Messages      ----- Message -----   From: Diane Mckay RN   Sent: 11/20/2019   2:04 PM CST   To: Farhan Schilling MD   Subject: RE: level not in chart verbal results             Farhan 5 mg/kg   361. What do you want to increase to?   10 mg ????

## 2019-11-22 DIAGNOSIS — K74.60 CIRRHOSIS OF LIVER (H): ICD-10-CM

## 2019-11-22 LAB
ALBUMIN SERPL-MCNC: 2.4 G/DL (ref 3.4–5)
ALP SERPL-CCNC: 118 U/L (ref 40–150)
ALT SERPL W P-5'-P-CCNC: 42 U/L (ref 0–50)
ANION GAP SERPL CALCULATED.3IONS-SCNC: 8 MMOL/L (ref 3–14)
AST SERPL W P-5'-P-CCNC: 69 U/L (ref 0–45)
BILIRUB DIRECT SERPL-MCNC: 0.8 MG/DL (ref 0–0.2)
BILIRUB SERPL-MCNC: 1.8 MG/DL (ref 0.2–1.3)
BUN SERPL-MCNC: 8 MG/DL (ref 7–30)
CALCIUM SERPL-MCNC: 8.4 MG/DL (ref 8.5–10.1)
CHLORIDE SERPL-SCNC: 105 MMOL/L (ref 94–109)
CO2 SERPL-SCNC: 21 MMOL/L (ref 20–32)
CREAT SERPL-MCNC: 0.6 MG/DL (ref 0.52–1.04)
ERYTHROCYTE [DISTWIDTH] IN BLOOD BY AUTOMATED COUNT: 13.5 % (ref 10–15)
GFR SERPL CREATININE-BSD FRML MDRD: >90 ML/MIN/{1.73_M2}
GLUCOSE SERPL-MCNC: 79 MG/DL (ref 70–99)
HCT VFR BLD AUTO: 37.6 % (ref 35–47)
HGB BLD-MCNC: 13.1 G/DL (ref 11.7–15.7)
INR PPP: 1.33 (ref 0.86–1.14)
MCH RBC QN AUTO: 35.3 PG (ref 26.5–33)
MCHC RBC AUTO-ENTMCNC: 34.8 G/DL (ref 31.5–36.5)
MCV RBC AUTO: 101 FL (ref 78–100)
PLATELET # BLD AUTO: 109 10E9/L (ref 150–450)
POTASSIUM SERPL-SCNC: 3.8 MMOL/L (ref 3.4–5.3)
PROT SERPL-MCNC: 8.8 G/DL (ref 6.8–8.8)
RBC # BLD AUTO: 3.71 10E12/L (ref 3.8–5.2)
SODIUM SERPL-SCNC: 134 MMOL/L (ref 133–144)
WBC # BLD AUTO: 3.6 10E9/L (ref 4–11)

## 2019-11-22 PROCEDURE — 36415 COLL VENOUS BLD VENIPUNCTURE: CPT | Performed by: INTERNAL MEDICINE

## 2019-11-22 PROCEDURE — 80076 HEPATIC FUNCTION PANEL: CPT | Performed by: INTERNAL MEDICINE

## 2019-11-22 PROCEDURE — 80048 BASIC METABOLIC PNL TOTAL CA: CPT | Performed by: INTERNAL MEDICINE

## 2019-11-22 PROCEDURE — 85027 COMPLETE CBC AUTOMATED: CPT | Performed by: INTERNAL MEDICINE

## 2019-11-22 PROCEDURE — 85610 PROTHROMBIN TIME: CPT | Performed by: INTERNAL MEDICINE

## 2019-11-22 NOTE — PROGRESS NOTES
Was the patient contacted by phone and reminded of the upcoming visit? Yes    Was the patient instructed to bring a current list of all medications to the appointment or instructed to bring in all medication bottles? Yes, patient verbalized understanding    Were ordered labs and tests completed prior to the appointment? She is planning on going to her local  tomorrow (11/22/19) to have labs drawn    Were the needed lab orders placed? Yes    Patient instructed to arrive early for check-in    Ghazal Fleming [facility]  11/21/2019 6:02 PM

## 2019-11-25 ENCOUNTER — PATIENT OUTREACH (OUTPATIENT)
Dept: GASTROENTEROLOGY | Facility: CLINIC | Age: 54
End: 2019-11-25

## 2019-11-25 NOTE — PROGRESS NOTES
Pt wanted to clarify the time of her infusion appt time. Pt informed and will cancel her infusion appt at [facility]. Pt also informed that 10 mg/kg was approved. Pt had no further questions.

## 2019-11-26 ENCOUNTER — OFFICE VISIT (OUTPATIENT)
Dept: GASTROENTEROLOGY | Facility: CLINIC | Age: 54
End: 2019-11-26
Attending: INTERNAL MEDICINE
Payer: COMMERCIAL

## 2019-11-26 ENCOUNTER — OFFICE VISIT (OUTPATIENT)
Dept: FAMILY MEDICINE | Facility: CLINIC | Age: 54
End: 2019-11-26
Payer: COMMERCIAL

## 2019-11-26 ENCOUNTER — PRE VISIT (OUTPATIENT)
Dept: GASTROENTEROLOGY | Facility: CLINIC | Age: 54
End: 2019-11-26

## 2019-11-26 VITALS
WEIGHT: 155 LBS | TEMPERATURE: 98.5 F | DIASTOLIC BLOOD PRESSURE: 69 MMHG | BODY MASS INDEX: 24.91 KG/M2 | SYSTOLIC BLOOD PRESSURE: 114 MMHG | OXYGEN SATURATION: 100 % | HEART RATE: 111 BPM | HEIGHT: 66 IN

## 2019-11-26 VITALS
HEART RATE: 99 BPM | TEMPERATURE: 98.1 F | HEIGHT: 66 IN | SYSTOLIC BLOOD PRESSURE: 117 MMHG | WEIGHT: 156 LBS | BODY MASS INDEX: 25.07 KG/M2 | DIASTOLIC BLOOD PRESSURE: 73 MMHG

## 2019-11-26 DIAGNOSIS — K50.113 CROHN'S DISEASE OF LARGE INTESTINE WITH FISTULA (H): ICD-10-CM

## 2019-11-26 DIAGNOSIS — K72.90 DECOMPENSATION OF CIRRHOSIS OF LIVER (H): ICD-10-CM

## 2019-11-26 DIAGNOSIS — K50.119 CROHN'S DISEASE OF PERIANAL REGION WITH COMPLICATION (H): ICD-10-CM

## 2019-11-26 DIAGNOSIS — Z01.818 PREOP GENERAL PHYSICAL EXAM: Primary | ICD-10-CM

## 2019-11-26 DIAGNOSIS — K74.69 OTHER CIRRHOSIS OF LIVER (H): ICD-10-CM

## 2019-11-26 DIAGNOSIS — Z86.79 HISTORY OF ESSENTIAL HYPERTENSION: ICD-10-CM

## 2019-11-26 DIAGNOSIS — K70.31 ALCOHOLIC CIRRHOSIS OF LIVER WITH ASCITES (H): ICD-10-CM

## 2019-11-26 DIAGNOSIS — K74.60 DECOMPENSATION OF CIRRHOSIS OF LIVER (H): ICD-10-CM

## 2019-11-26 PROCEDURE — 93000 ELECTROCARDIOGRAM COMPLETE: CPT | Performed by: NURSE PRACTITIONER

## 2019-11-26 PROCEDURE — G0463 HOSPITAL OUTPT CLINIC VISIT: HCPCS | Mod: ZF

## 2019-11-26 PROCEDURE — 99214 OFFICE O/P EST MOD 30 MIN: CPT | Performed by: NURSE PRACTITIONER

## 2019-11-26 ASSESSMENT — MIFFLIN-ST. JEOR
SCORE: 1319.36
SCORE: 1314.83

## 2019-11-26 ASSESSMENT — PAIN SCALES - GENERAL: PAINLEVEL: EXTREME PAIN (8)

## 2019-11-26 NOTE — PROGRESS NOTES
[address] [address]  [phone]  Dept: [phone]    PRE-OP EVALUATION:  Today's date: 2019    Abiola Matute (: 1965) presents for pre-operative evaluation assessment as requested by Dr. Farhan Schilling.  She requires evaluation and anesthesia risk assessment prior to undergoing surgery/procedure colonoscopy and upper endoscopy for treatment of Crohn's and variceal screening.      Proposed Surgery/ Procedure: Colonoscopy  Date of Surgery/ Procedure: 2019  Time of Surgery/ Procedure: 9:15am  Hospital/Surgical Facility: [facility]  FAX number [phone]  Primary Physician: No Ref-Primary, Physician  Type of Anesthesia Anticipated: MAC    Patient has a Health Care Directive or Living Will:  NO    1. NO - Do you have a history of heart attack, stroke, stent, bypass or surgery on an artery in the head, neck, heart or legs?  2. NO - Do you ever have any pain or discomfort in your chest?  3. NO - Do you have a history of  Heart Failure?  4. NO - Are you troubled by shortness of breath when: walking on the level, up a slight hill or at night?  5. NO - Do you currently have a cold, bronchitis or other respiratory infection?  6. NO - Do you have a cough, shortness of breath or wheezing?  7. NO - Do you sometimes get pains in the calves of your legs when you walk?  8. NO - Do you or anyone in your family have previous history of blood clots?  9. NO - Do you or does anyone in your family have a serious bleeding problem such as prolonged bleeding following surgeries or cuts?  10. NO - Have you ever had problems with anemia or been told to take iron pills?  11. NO - Have you had any abnormal blood loss such as black, tarry or bloody stools, or abnormal vaginal bleeding?  12. NO - Have you ever had a blood transfusion?  13. NO - Have you or any of your relatives ever had problems with anesthesia?  14. NO - Do you have sleep apnea,  excessive snoring or daytime drowsiness?  15. NO - Do you have any prosthetic heart valves?  16. NO - Do you have prosthetic joints?  17. NO - Is there any chance that you may be pregnant?      HPI:     HPI related to upcoming procedure: Severe perianal Crohn's disease-Also needs EGD for variceal screening due to Cirrhosis.   PREVIOUS ENDOSCOPY:  3/7/19: Flex sig: Colon and rectum appeared normal. 2 large deep ulcers extending from the anal cnaal to left perianal area.   10/23/19: Colonoscopy: Endoscopically normal ileum.  Mild pan colonic congestion with contact friability thought to be related to portal hypertension and thrombocytopenia.  Prior anal verge ulcer is healed   12/23/14: Colonoscopy: Perianal skin tag noted.  Ileum normal, colon normal.    Crohns-Sees Dr Schilling [facility]. History of long-standing diarrhea as well as recurrent aphthous ulcers.  In April 2019 she was diagnosed with Crohn's disease due to perianal fistula. She was started on infliximab May 2019.    Cirrhosis with ascites- Follows up with Jeannette Cervantes MD at the [facility]. Away from ETOH for 2 months. Paracentesis 10/9/19 3700 ml-takes furosemide 40 MG tablet lasix and spironolactone 100 MG tablet daily.    AST   Date Value Ref Range Status   11/22/2019 69 (H) 0 - 45 U/L Final     Lab Results   Component Value Date    ALT 42 11/22/2019     Lab Results   Component Value Date    BILITOTAL 1.8 11/22/2019    BILITOTAL 1.8 11/12/2019    BILITOTAL 1.9 10/23/2019    BILITOTAL 1.8 10/23/2019    BILITOTAL 2.1 10/22/2019       Thrombocytopenia due to cirrhosis.   Platelet Count   Date Value Ref Range Status   11/22/2019 109 (L) 150 - 450 10e9/L Final     History of Essential Hypertension;patient reports resolution with discontinuation of lisinopril 2 months ago while hospitalized.  BP Readings from Last 3 Encounters:   11/26/19 114/69   11/26/19 117/73   11/12/19 115/81     MEDICAL HISTORY:     Patient Active Problem  List    Diagnosis Date Noted     Alcoholic fatty liver 05/14/2014     Priority: High     Acquired hyperbilirubinemia- ? secondary to alcohol use 05/14/2014     Priority: High     Atypical ductal hyperplasia of left breast 10/04/2010     Priority: High     Cholecystitis 10/22/2019     Priority: Medium     Alcoholic cirrhosis of liver with ascites (H) - seeing [facility]  10/22/2019     Priority: Medium     Biliary colic 10/21/2019     Priority: Medium     Severe Perianal Crohn's Disease 07/23/2019     Priority: Medium     Stool incontinence 09/30/2014     Priority: Medium     CARDIOVASCULAR SCREENING; LDL GOAL LESS THAN 130 09/30/2014     Priority: Medium     Gastroenteritis 05/20/2014     Priority: Medium     Diffuse nodular cirrhosis of liver (H) 05/20/2014     Priority: Medium     CT abdomen from 2/2019   IMPRESSION:  1. Cirrhotic liver with evidence of portal venous hypertension,  including gastroesophageal varices and probable hemorrhoids. The  spleen size is upper normal. No ascites.  2. Cholelithiasis.       AA (alcohol abuse) - ? ongoing  05/14/2014     Priority: Medium     Per hospital d/c summary 5/2014: Alcoholic cirrhosis of liver with small ascites and ultrasound consistent with liver nodule.  She had elevated LFTs and was higher in 2013.  Her hepatitis C and B antigens are negative and her hepatitis A antigen and antibody also was negative.  The patient had a long history of alcohol use.  She drinks about 4 drinks every other day or every day and the patient was advised alcohol cessation and was explained that her liver was damaged although her LFTs are decreased from levels of 09/2013 but I think she already developed cirrhosis of the liver and without stopping alcohol she will progress to full blown cirrhosis of the liver with portal hypertension and understand the consequences and patient understood she needs to go to AA and alcohol treatment.  The patient was placed with alcohol withdrawal protocol with  Ativan started with vitamins during her admission here.  The patient understood the consequence of continued alcohol use.         Essential hypertension with goal blood pressure less than 140/90 06/18/2013     Priority: Medium     Abnormal liver enzymes- ? related to ongoing etoh use/abuse 10/12/2012     Priority: Medium     S/P total hysterectomy and bilateral salpingo-oophorectomy 09/26/2012     Priority: Medium     Claustrophobia 09/19/2011     Priority: Medium     Postmenopausal- s/p hysterectomy with BSO - not on HRT secondary to atypical ductal hyperplasia & breast pain -no paps needed 08/17/2011     Priority: Medium     Idiopathic thrombocytopenia (H) 03/01/2011     Priority: Medium     Rosacea 08/24/2010     Priority: Medium     Iron deficiency anemia, unspecified iron deficiency anemia type 05/08/2006     Priority: Medium     Problem list name updated by automated process. Provider to review       Intestinal infection due to Clostridium difficile 03/09/2006     Priority: Medium     Other isolated or specific phobias 12/06/2005     Priority: Medium     fear of flying - gets Ativan prn.        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 Provider to review and confirm.       Contact dermatitis and other eczema, due to unspecified cause 12/06/2005     Priority: Medium     perianal  - recurrent - clobetasol        Non morbid obesity due to excess calories 12/23/2003     Priority: Medium     Problem list name updated by automated process. Provider to review        Past Medical History:   Diagnosis Date     Atypical ductal hyperplasia of breast 9/10/10    ERT not recommended -left - and flat epithelial atypia-scheduled for breast biopsy 9/17/2010      BIFID UVULA (aka UVULA)       Contact dermatitis and other eczema, due to unspecified cause     perianal  - recurrent - clobetasol      Hypertension      IBS (irritable bowel syndrome)       Obesity, unspecified      Other isolated or specific phobias     fear of flying - gets Ativan prn.      Past Surgical History:   Procedure Laterality Date     BIOPSY ANAL N/A 3/28/2019    anal biopsy and culure placement of seton - Dr Fleming     BREAST BIOPSY, RT/LT  9/17/2010    left - scheduled with Dr. Kojo WILKERSON APPENDECTOMY  at age 14     COLONOSCOPY  2006     COLONOSCOPY N/A 12/23/2014    Procedure: COMBINED COLONOSCOPY, SINGLE OR MULTIPLE BIOPSY/POLYPECTOMY BY BIOPSY;  Surgeon: Diane Fleming MD;  Location:  GI     EXAM UNDER ANESTHESIA ANUS N/A 3/28/2019    Procedure: EXAM UNDER ANESTHESIA ANUS;  Surgeon: Diane Fleming MD;  Location:  OR     HC CLOSED TX BIMALLEOLAR ANKLE FX W/O MANIPULATION  at age 27    left ankle ORIF, plates and screws removed at age 36     HYSTERECTOMY, VAGINAL  2006    with Dr. Licha Zhou - with BSO for fibroids      SURGICAL HISTORY OF -   4/15/2010    Pelviscopy with removal of bilateral hydrosalpinges.      Current Outpatient Medications   Medication Sig Dispense Refill     furosemide (LASIX) 40 MG tablet Take 40 mg by mouth daily        Milk Thistle-Dand-Fennel-Licor (MILK THISTLE XTRA) CAPS capsule Take 1 capsule by mouth daily       Multiple Vitamins-Minerals (MULTIVITAMIN & MINERAL PO) Take  by mouth daily.       oxyCODONE (ROXICODONE) 5 MG tablet Take 1 tablet (5 mg) by mouth every 6 hours as needed for severe pain 9 tablet 0     spironolactone (ALDACTONE) 100 MG tablet Take 100 mg by mouth daily        OTC products: None, except as noted above    Allergies   Allergen Reactions     Fish Oil      Redness and itching around eye area only - went away when fish oil capsules stopped      Metronidazole      pain/itching     Naphthalenemethylamines      Lamisil = mild urticarial reaction     Penicillins Cramps     Vomiting     Ppd [Tuberculin Purified Protein Derivative]      Sulfa Drugs      hives      Latex Allergy: NO    Social History     Tobacco Use      "Smoking status: Never Smoker     Smokeless tobacco: Never Used   Substance Use Topics     Alcohol use: Yes     Alcohol/week: 0.0 standard drinks     Comment: occasional     History   Drug Use No     Comment: no herbal meds either       REVIEW OF SYSTEMS:   CONSTITUTIONAL: NEGATIVE for fever, chills, change in weight  INTEGUMENTARY/SKIN: NEGATIVE for worrisome rashes, moles or lesions  EYES: NEGATIVE for vision changes or irritation  ENT/MOUTH: NEGATIVE for ear, mouth and throat problems  RESP: NEGATIVE for significant cough or SOB  BREAST: NEGATIVE for masses, tenderness or discharge  CV: NEGATIVE for chest pain, palpitations or peripheral edema  GI: NEGATIVE for nausea, abdominal pain, heartburn, or change in bowel habits-diarrhea at baseline  : NEGATIVE for frequency, dysuria, or hematuria  MUSCULOSKELETAL: POSITIVE for arthralgias   NEURO: NEGATIVE for weakness, dizziness or paresthesias  ENDOCRINE: NEGATIVE for temperature intolerance, skin/hair changes  HEME: NEGATIVE for bleeding problems  PSYCHIATRIC: NEGATIVE for changes in mood or affect    EXAM:   /69 (BP Location: Left arm, Patient Position: Chair, Cuff Size: Adult Regular)   Pulse 111   Temp 98.5  F (36.9  C) (Oral)   Ht 1.676 m (5' 6\")   Wt 70.3 kg (155 lb)   LMP 05/31/2006   SpO2 100%   Breastfeeding No   BMI 25.02 kg/m      GENERAL APPEARANCE: healthy, alert and no distress     EYES: EOMI, PERRL     HENT: ear canals and TM's normal and nose and mouth without ulcers or lesions     NECK: no adenopathy, no asymmetry, masses, or scars and thyroid normal to palpation     RESP: lungs clear to auscultation - no rales, rhonchi or wheezes     CV: regular rates and rhythm, normal S1 S2, no S3 or S4 and no murmur, click or rub     ABDOMEN:  soft, nontender, no HSM or masses and bowel sounds normal     MS: extremities normal- no gross deformities noted, no evidence of inflammation in joints, FROM in all extremities.     SKIN: no suspicious lesions " or rashes     NEURO: Normal strength and tone, sensory exam grossly normal, mentation intact and speech normal     PSYCH: mentation appears normal. and affect normal/bright     LYMPHATICS: No cervical adenopathy    DIAGNOSTICS:   EKG: appears normal, NSR, normal axis, normal intervals, no acute ST/T changes c/w ischemia, no LVH by voltage criteria, unchanged from previous tracings    Recent Labs   Lab Test 11/22/19  0830 11/12/19  1646 10/23/19  1019 10/23/19  0626  09/27/17  0902   HGB 13.1 14.1 11.9 10.4*   < >  --    * 113* 104* 72*   < >  --    INR 1.33*  --   --  1.77*   < >  --      --  136 140   < >  --    POTASSIUM 3.8  --  3.5 3.5   < >  --    CR 0.60  --  0.59 0.52   < >  --    A1C  --   --   --   --   --  4.6    < > = values in this interval not displayed.     Last Comprehensive Metabolic Panel:  Sodium   Date Value Ref Range Status   11/22/2019 134 133 - 144 mmol/L Final     Potassium   Date Value Ref Range Status   11/22/2019 3.8 3.4 - 5.3 mmol/L Final     Chloride   Date Value Ref Range Status   11/22/2019 105 94 - 109 mmol/L Final     Carbon Dioxide   Date Value Ref Range Status   11/22/2019 21 20 - 32 mmol/L Final     Anion Gap   Date Value Ref Range Status   11/22/2019 8 3 - 14 mmol/L Final     Glucose   Date Value Ref Range Status   11/22/2019 79 70 - 99 mg/dL Final     Urea Nitrogen   Date Value Ref Range Status   11/22/2019 8 7 - 30 mg/dL Final     Creatinine   Date Value Ref Range Status   11/22/2019 0.60 0.52 - 1.04 mg/dL Final     GFR Estimate   Date Value Ref Range Status   11/22/2019 >90 >60 mL/min/[1.73_m2] Final     Comment:     Non  GFR Calc  Starting 12/18/2018, serum creatinine based estimated GFR (eGFR) will be   calculated using the Chronic Kidney Disease Epidemiology Collaboration   (CKD-EPI) equation.       Calcium   Date Value Ref Range Status   11/22/2019 8.4 (L) 8.5 - 10.1 mg/dL Final     Bilirubin Total   Date Value Ref Range Status   11/22/2019 1.8  (H) 0.2 - 1.3 mg/dL Final     Alkaline Phosphatase   Date Value Ref Range Status   11/22/2019 118 40 - 150 U/L Final     ALT   Date Value Ref Range Status   11/22/2019 42 0 - 50 U/L Final     AST   Date Value Ref Range Status   11/22/2019 69 (H) 0 - 45 U/L Final       IMPRESSION:   Reason for surgery/procedure: colonoscopy and upper endoscopy  Diagnosis/reason for consult: preop exam    The proposed surgical procedure is considered INTERMEDIATE risk.    REVISED CARDIAC RISK INDEX  The patient has the following serious cardiovascular risks for perioperative complications such as (MI, PE, VFib and 3  AV Block):  No serious cardiac risks  INTERPRETATION: 0 risks: Class I (very low risk - 0.4% complication rate)    The patient has the following additional risks for perioperative complications:  No identified additional risks      ICD-10-CM    1. Preop general physical exam Z01.818 EKG 12-lead complete w/read - Clinics   2. Severe Perianal Crohn's Disease K50.119    3. Alcoholic cirrhosis of liver with ascites (H) - seeing [facility]  K70.31    4. History of essential hypertension Z86.79        RECOMMENDATIONS:     Encourage no NSAIDS, aspirin or vitamin supplementation for the next 7 days. Patient will hold milk thistle and multivitamin 7 days prior to surgery. Oxycodone is a as needed medication and she will not take the am of surgery.     --Patient is to take diuretics,  lasix and spironolactone morning of surgery.     APPROVAL GIVEN to proceed with proposed procedure, without further diagnostic evaluation       FLAKITO Spencer-BC  Signed Electronically by: DARYN Spencer CNP    Copy of this evaluation report is provided to requesting physician.    [facility] Preop Guidelines    Revised Cardiac Risk Index

## 2019-11-26 NOTE — PROGRESS NOTES
REASON FOR CONSULTATION: alcoholic cirrhosis  REFERRING PROVIDER: Farhan Schilling MD, Mal Goncalves MD,  [facility]    A/P  Abiola Matute is a 54 year old female with DUNN and alcoholic cirrhosis    Cirrhosis decompensated with ascites and abnormal liver function. MELD 15. Away from ETOH for 2 months. Will follow labs and see how she does. Hold off on LT evaluation at this time awaiting progress.    HCC screening UTD with US 10/9/19    Variceal screening Needs EGD. Ordred and scheduled for December Thrombocytopenia 2/2 ETOH and cirrhosis. Plt around 100.    Ascites Controlled with diuretics. Discussed this and low Na.     Bone health DEXA ordered    Crohns Sees Dr Schilling.    Discussed cirrhosis and its sequelae.     RTC 3 mo.    This was a 45 minute visit, over 50% counseling and coordination of care.     Jeannette Cervantes MD  Hepatology/Liver Transplant  Medical Director, Liver Transplantation  [facility]  Abiola Matute is a 54 year old female referred for alcoholic cirrhosis. Diagnosis was made earlier this year when she was having colon evaluations with a diagnosis of perianal Crohn's and her elevated liver tests were addressed. She has had elevated liver tests for about 7 years. She is here with her .     She said she was told she had fatty liver disease  Liver biopsy read by Dr. Montoya  1. Steatohepatitis, featuring:     a. Moderate steatosis (35%, mixed macro and microvesicular, non-zonal)     b. Mild necroinflammatory activity (grade 1 of 3)     c. Well-developed micronodular cirrhosis (stage 4 of 4)  2. Negative for autoimmune hepatitis or other intercurrent liver disease   3. See comment    A) Needle biopsy of liver provides an adequate sample, 1.8 cm in aggregate length, with approximately nine portal areas present. Bile ducts are intact. Well-developed micronodular cirrhosis is associated with overt steatohepatitis. Prominent Linh's hyaline is present, somewhat  suggestive of alcoholic etiology. There is no significant lymphoplasma cellular infiltrate and no interface hepatitis to support diagnosis of autoimmune hepatitis. Trichrome stain confirms micronodular cirrhosis. An iron stain is negative.    Reviewed with Dr. Cardona.   53-year-old woman presents with abnormal liver tests and cross sectional CT suggestive of cirrhosis. The patient has been diagnosed with portal hypertension and has anal changes clinically suggestive of Crohn's disease. Serum chemistries include AST 99, ALT 65, alkaline phosphatase 151, total bilirubin 2.4, mildly elevated IgG and IgM and elevated F-actin at 28. Antinuclear antibody is negative.     Alcohol  Last alcohol was August 2019.  Alcohol pattern has been 3-4 times per week, 2-6 beers depending on activities.   No CD treatment, no DWI.    Ascites  Para 10/9/19 3700 ml  This has resolved on kathleen 100 and furosemide 40    Crohns  She was started on infliximab     Risk factors for fatty liver: was obese in the past, typically 200 pounds .No DM. HTN    Lab Test 11/22/19  0830   PROTTOTAL 8.8   ALBUMIN 2.4*   BILITOTAL 1.8*   ALKPHOS 118   AST 69*   ALT 42     Lab Test 11/22/19  0830   WBC 3.6*   RBC 3.71*   HGB 13.1   HCT 37.6   *   MCH 35.3*   MCHC 34.8   RDW 13.5   *     MELD-Na score: 15 at 11/22/2019  8:30 AM  MELD score: 12 at 11/22/2019  8:30 AM  Calculated from:  Serum Creatinine: 0.60 mg/dL (Rounded to 1 mg/dL) at 11/22/2019  8:30 AM  Serum Sodium: 134 mmol/L at 11/22/2019  8:30 AM  Total Bilirubin: 1.8 mg/dL at 11/22/2019  8:30 AM  INR(ratio): 1.33 at 11/22/2019  8:30 AM  Age: 54 years    HCV neg  HBV neg  JILL neg  Factin 28  AMA neg  Iron panel sat 59, ferritin 349  No HFE testing  Liver biopsy c/w alcoholic liver disease    Past Medical History  Past Medical History:   Diagnosis Date     Atypical ductal hyperplasia of breast 9/10/10    ERT not recommended -left - and flat epithelial atypia-scheduled for breast biopsy  9/17/2010      BIFID UVULA (aka UVULA)       Contact dermatitis and other eczema, due to unspecified cause     perianal  - recurrent - clobetasol      Hypertension      IBS (irritable bowel syndrome)      Obesity, unspecified      Other isolated or specific phobias     fear of flying - gets Ativan prn.        Social History  Social History     Socioeconomic History     Marital status:      Spouse name: Albino     Number of children: 3     Years of education: 14     Highest education level: Not on file   Occupational History     Occupation: [employer]     Comment:    Social Needs     Financial resource strain: Not on file     Food insecurity:     Worry: Not on file     Inability: Not on file     Transportation needs:     Medical: Not on file     Non-medical: Not on file   Tobacco Use     Smoking status: Never Smoker     Smokeless tobacco: Never Used   Substance and Sexual Activity     Alcohol use: Yes     Alcohol/week: 0.0 standard drinks     Comment: occasional     Drug use: No     Comment: no herbal meds either     Sexual activity: Yes     Partners: Male     Birth control/protection: Surgical     Comment: chazb had vasectomy   Lifestyle     Physical activity:     Days per week: Not on file     Minutes per session: Not on file     Stress: Not on file   Relationships     Social connections:     Talks on phone: Not on file     Gets together: Not on file     Attends Sikh service: Not on file     Active member of club or organization: Not on file     Attends meetings of clubs or organizations: Not on file     Relationship status: Not on file     Intimate partner violence:     Fear of current or ex partner: Not on file     Emotionally abused: Not on file     Physically abused: Not on file     Forced sexual activity: Not on file   Other Topics Concern      Service No     Blood Transfusions No     Caffeine Concern No     Comment: rarely drinks caffeine     Occupational  "Exposure Not Asked     Hobby Hazards Not Asked     Sleep Concern Not Asked     Stress Concern Not Asked     Weight Concern Not Asked     Special Diet Not Asked     Back Care Not Asked     Exercise Yes     Comment: does a lot of walking - 4x/week     Bike Helmet Not Asked     Seat Belt Yes     Comment: always     Self-Exams Yes     Comment: SBE encouraged monthly     Parent/sibling w/ CABG, MI or angioplasty before 65F 55M? Yes   Social History Narrative    calcium - drinks 5-6 large glasses skim milk/day    flex sig/colonoscopy -at age 49    sun precautions - discussed    mammogram - needs every 2 years in her 30's, then yearly from then on    Td booster -  and 2010    pneumovax -at age 59    DEXA -when perimenopausal    stool hemoccults - every year after age 40    ASA- start at age 40    mulvitamin - encouraged   Not currently working since 2019. Got laid off and hasn't returned due to health.     Family History  Family History   Problem Relation Age of Onset     Breast Cancer Mother      Gastrointestinal Disease Mother      Heart Disease Father 57     Heart Disease Paternal Grandfather      Hypertension Maternal Grandmother      Diabetes Paternal Grandmother      Diabetes Maternal Grandfather      Cancer Sister    F  from heart disease      ROS 10 point ROS neg other than the symptoms noted above in the HPI.    /73   Pulse 99   Temp 98.1  F (36.7  C) (Oral)   Ht 1.676 m (5' 6\")   Wt 70.8 kg (156 lb)   LMP 2006   BMI 25.18 kg/m      Constitutional: alert and no distress.   Neck: Neck supple. No adenopathy. Thyroid symmetric, normal size  HEENT:Normocephalic. No masses, lesions, tenderness or abnormalities. No temporal muscle wasting ENT exam normal, no neck nodes or sinus tenderness. No oral lesions  Cardiovascular: negative, No lifts, heaves, or thrills. RRR. No murmurs, clicks gallops or rub  Respiratory: negative, Good diaphragmatic excursion. Lungs clear. No wheezes or " rales  Gastrointestinal: Abdomen soft, non-tender. BS normal.  No masses, organomegaly  Skin: no suspicious lesions or rashes. No spider angiomata or palmar erythema. Nails normal.  Neurologic: Alert and oriented x3. No asterixis or tremor. Gait normal.   Psychiatric:  Appropriate, well groomed.  Hematologic/Lymphatic/Immunologic: Normal cervical and supraclavicular  lymph nodes

## 2019-11-26 NOTE — LETTER
11/26/2019       RE: Abiola Matute  [address] [facility] [address]     Dear Colleague,    Thank you for referring your patient, Abiola Matute, to the [facility] HEPATOLOGY at [facility]. Please see a copy of my visit note below.    REASON FOR CONSULTATION: alcoholic cirrhosis  REFERRING PROVIDER: Farhan Schilling MD, Mal Goncalves MD,  [facility]    A/P  Abiola Matute is a 54 year old female with DUNN and alcoholic cirrhosis    Cirrhosis decompensated with ascites and abnormal liver function. MELD 15. Away from ETOH for 2 months. Will follow labs and see how she does. Hold off on LT evaluation at this time awaiting progress.    HCC screening UTD with US 10/9/19    Variceal screening Needs EGD. Ordred and scheduled for December  Thrombocytopenia 2/2 ETOH and cirrhosis. Plt around 100.    Ascites Controlled with diuretics. Discussed this and low Na.     Bone health DEXA ordered    Crohns Sees Dr Schilling.    Discussed cirrhosis and its sequelae.     RTC 3 mo.    This was a 45 minute visit, over 50% counseling and coordination of care.     Jeannette Cervantes MD  Hepatology/Liver Transplant  Medical Director, Liver Transplantation  [facility]  Subjective  Abiola Mtaute is a 54 year old female referred for alcoholic cirrhosis. Diagnosis was made earlier this year when she was having colon evaluations with a diagnosis of perianal Crohn's and her elevated liver tests were addressed. She has had elevated liver tests for about 7 years. She is here with her .     She said she was told she had fatty liver disease  Liver biopsy read by Dr. Montoya  1. Steatohepatitis, featuring:     a. Moderate steatosis (35%, mixed macro and microvesicular, non-zonal)     b. Mild necroinflammatory activity (grade 1 of 3)     c. Well-developed micronodular cirrhosis (stage 4 of 4)  2. Negative for autoimmune hepatitis or other intercurrent liver disease   3. See  comment    A) Needle biopsy of liver provides an adequate sample, 1.8 cm in aggregate length, with approximately nine portal areas present. Bile ducts are intact. Well-developed micronodular cirrhosis is associated with overt steatohepatitis. Prominent Linh's hyaline is present, somewhat suggestive of alcoholic etiology. There is no significant lymphoplasma cellular infiltrate and no interface hepatitis to support diagnosis of autoimmune hepatitis. Trichrome stain confirms micronodular cirrhosis. An iron stain is negative.    Reviewed with Dr. Cardona.   53-year-old woman presents with abnormal liver tests and cross sectional CT suggestive of cirrhosis. The patient has been diagnosed with portal hypertension and has anal changes clinically suggestive of Crohn's disease. Serum chemistries include AST 99, ALT 65, alkaline phosphatase 151, total bilirubin 2.4, mildly elevated IgG and IgM and elevated F-actin at 28. Antinuclear antibody is negative.     Alcohol  Last alcohol was August 2019.  Alcohol pattern has been 3-4 times per week, 2-6 beers depending on activities.   No CD treatment, no DWI.    Ascites  Para 10/9/19 3700 ml  This has resolved on kathleen 100 and furosemide 40    Crohns  She was started on infliximab     Risk factors for fatty liver: was obese in the past, typically 200 pounds .No DM. HTN    Lab Test 11/22/19  0830   PROTTOTAL 8.8   ALBUMIN 2.4*   BILITOTAL 1.8*   ALKPHOS 118   AST 69*   ALT 42     Lab Test 11/22/19  0830   WBC 3.6*   RBC 3.71*   HGB 13.1   HCT 37.6   *   MCH 35.3*   MCHC 34.8   RDW 13.5   *     MELD-Na score: 15 at 11/22/2019  8:30 AM  MELD score: 12 at 11/22/2019  8:30 AM  Calculated from:  Serum Creatinine: 0.60 mg/dL (Rounded to 1 mg/dL) at 11/22/2019  8:30 AM  Serum Sodium: 134 mmol/L at 11/22/2019  8:30 AM  Total Bilirubin: 1.8 mg/dL at 11/22/2019  8:30 AM  INR(ratio): 1.33 at 11/22/2019  8:30 AM  Age: 54 years    HCV neg  HBV neg  JILL neg  Factin 28  AMA  neg  Iron panel sat 59, ferritin 349  No HFE testing  Liver biopsy c/w alcoholic liver disease    Past Medical History  Past Medical History:   Diagnosis Date     Atypical ductal hyperplasia of breast 9/10/10    ERT not recommended -left - and flat epithelial atypia-scheduled for breast biopsy 9/17/2010      BIFID UVULA (aka UVULA)       Contact dermatitis and other eczema, due to unspecified cause     perianal  - recurrent - clobetasol      Hypertension      IBS (irritable bowel syndrome)      Obesity, unspecified      Other isolated or specific phobias     fear of flying - gets Ativan prn.        Social History  Social History     Socioeconomic History     Marital status:      Spouse name: Albino     Number of children: 3     Years of education: 14     Highest education level: Not on file   Occupational History     Occupation: [employer]     Comment:    Social Needs     Financial resource strain: Not on file     Food insecurity:     Worry: Not on file     Inability: Not on file     Transportation needs:     Medical: Not on file     Non-medical: Not on file   Tobacco Use     Smoking status: Never Smoker     Smokeless tobacco: Never Used   Substance and Sexual Activity     Alcohol use: Yes     Alcohol/week: 0.0 standard drinks     Comment: occasional     Drug use: No     Comment: no herbal meds either     Sexual activity: Yes     Partners: Male     Birth control/protection: Surgical     Comment: harper had vasectomy   Lifestyle     Physical activity:     Days per week: Not on file     Minutes per session: Not on file     Stress: Not on file   Relationships     Social connections:     Talks on phone: Not on file     Gets together: Not on file     Attends Mandaen service: Not on file     Active member of club or organization: Not on file     Attends meetings of clubs or organizations: Not on file     Relationship status: Not on file     Intimate partner violence:     Fear of current  "or ex partner: Not on file     Emotionally abused: Not on file     Physically abused: Not on file     Forced sexual activity: Not on file   Other Topics Concern      Service No     Blood Transfusions No     Caffeine Concern No     Comment: rarely drinks caffeine     Occupational Exposure Not Asked     Hobby Hazards Not Asked     Sleep Concern Not Asked     Stress Concern Not Asked     Weight Concern Not Asked     Special Diet Not Asked     Back Care Not Asked     Exercise Yes     Comment: does a lot of walking - 4x/week     Bike Helmet Not Asked     Seat Belt Yes     Comment: always     Self-Exams Yes     Comment: SBE encouraged monthly     Parent/sibling w/ CABG, MI or angioplasty before 65F 55M? Yes   Social History Narrative    calcium - drinks 5-6 large glasses skim milk/day    flex sig/colonoscopy -at age 49    sun precautions - discussed    mammogram - needs every 2 years in her 30's, then yearly from then on    Td booster -  and 2010    pneumovax -at age 59    DEXA -when perimenopausal    stool hemoccults - every year after age 40    ASA- start at age 40    mulvitamin - encouraged   Not currently working since 2019. Got laid off and hasn't returned due to health.     Family History  Family History   Problem Relation Age of Onset     Breast Cancer Mother      Gastrointestinal Disease Mother      Heart Disease Father 57     Heart Disease Paternal Grandfather      Hypertension Maternal Grandmother      Diabetes Paternal Grandmother      Diabetes Maternal Grandfather      Cancer Sister    F  from heart disease      ROS 10 point ROS neg other than the symptoms noted above in the HPI.    /73   Pulse 99   Temp 98.1  F (36.7  C) (Oral)   Ht 1.676 m (5' 6\")   Wt 70.8 kg (156 lb)   LMP 2006   BMI 25.18 kg/m       Constitutional: alert and no distress.   Neck: Neck supple. No adenopathy. Thyroid symmetric, normal size  HEENT:Normocephalic. No masses, lesions, tenderness or " abnormalities. No temporal muscle wasting ENT exam normal, no neck nodes or sinus tenderness. No oral lesions  Cardiovascular: negative, No lifts, heaves, or thrills. RRR. No murmurs, clicks gallops or rub  Respiratory: negative, Good diaphragmatic excursion. Lungs clear. No wheezes or rales  Gastrointestinal: Abdomen soft, non-tender. BS normal.  No masses, organomegaly  Skin: no suspicious lesions or rashes. No spider angiomata or palmar erythema. Nails normal.  Neurologic: Alert and oriented x3. No asterixis or tremor. Gait normal.   Psychiatric:  Appropriate, well groomed.  Hematologic/Lymphatic/Immunologic: Normal cervical and supraclavicular  lymph nodes      Again, thank you for allowing me to participate in the care of your patient.    Sincerely,  Jeannette Cervantes MD

## 2019-11-26 NOTE — NURSING NOTE
"/73   Pulse 99   Temp 98.1  F (36.7  C) (Oral)   Ht 1.676 m (5' 6\")   Wt 70.8 kg (156 lb)   LMP 05/31/2006   BMI 25.18 kg/m    Chief Complaint   Patient presents with     Consult     consult with cirrhosis, tzimmer cma       "

## 2019-11-27 ENCOUNTER — INFUSION THERAPY VISIT (OUTPATIENT)
Dept: INFUSION THERAPY | Facility: CLINIC | Age: 54
End: 2019-11-27
Attending: INTERNAL MEDICINE
Payer: COMMERCIAL

## 2019-11-27 ENCOUNTER — HOSPITAL ENCOUNTER (OUTPATIENT)
Facility: CLINIC | Age: 54
Setting detail: SPECIMEN
Discharge: HOME | End: 2019-11-27
Attending: INTERNAL MEDICINE | Admitting: INTERNAL MEDICINE
Payer: COMMERCIAL

## 2019-11-27 VITALS
WEIGHT: 155.5 LBS | SYSTOLIC BLOOD PRESSURE: 111 MMHG | DIASTOLIC BLOOD PRESSURE: 72 MMHG | HEIGHT: 66 IN | TEMPERATURE: 98.7 F | HEART RATE: 72 BPM | RESPIRATION RATE: 16 BRPM | BODY MASS INDEX: 24.99 KG/M2

## 2019-11-27 DIAGNOSIS — K50.119 CROHN'S DISEASE OF PERIANAL REGION WITH COMPLICATION (H): Primary | ICD-10-CM

## 2019-11-27 LAB — ERYTHROCYTE [SEDIMENTATION RATE] IN BLOOD BY WESTERGREN METHOD: 105 MM/H (ref 0–30)

## 2019-11-27 PROCEDURE — 85652 RBC SED RATE AUTOMATED: CPT | Performed by: INTERNAL MEDICINE

## 2019-11-27 PROCEDURE — 96413 CHEMO IV INFUSION 1 HR: CPT

## 2019-11-27 PROCEDURE — 25000128 H RX IP 250 OP 636: Performed by: INTERNAL MEDICINE

## 2019-11-27 PROCEDURE — 25800030 ZZH RX IP 258 OP 636: Performed by: INTERNAL MEDICINE

## 2019-11-27 PROCEDURE — 96415 CHEMO IV INFUSION ADDL HR: CPT

## 2019-11-27 RX ORDER — DIPHENHYDRAMINE HCL 25 MG
25 CAPSULE ORAL ONCE
Status: CANCELLED
Start: 2019-12-11

## 2019-11-27 RX ORDER — ACETAMINOPHEN 325 MG/1
650 TABLET ORAL ONCE
Status: CANCELLED
Start: 2019-12-11

## 2019-11-27 RX ADMIN — INFLIXIMAB 700 MG: 100 INJECTION, POWDER, LYOPHILIZED, FOR SOLUTION INTRAVENOUS at 11:22

## 2019-11-27 RX ADMIN — SODIUM CHLORIDE 250 ML: 9 INJECTION, SOLUTION INTRAVENOUS at 11:21

## 2019-11-27 ASSESSMENT — MIFFLIN-ST. JEOR: SCORE: 1316.84

## 2019-11-27 NOTE — PROGRESS NOTES
Infusion Nursing Note:  Abiola Matute presents today for Remicade.    Patient seen by provider today: No   present during visit today: Not Applicable.    Note: Pt having a colonoscopy next Thursday. OK to give remicade today and proceed with colonoscopy on Thursday per Dr Shakir Contreras's RN.    Intravenous Access:  Labs drawn without difficulty.  Peripheral IV placed.    Treatment Conditions:  Biological Infusion Checklist:  ~~~ NOTE: If the patient answers yes to any of the questions below, hold the infusion and contact ordering provider or on-call provider.    1. Have you recently had an elevated temperature, fever, chills, productive cough, coughing for 3 weeks or longer or hemoptysis, abnormal vital signs, night sweats,  chest pain or have you noticed a decrease in your appetite, unexplained weight loss or fatigue? No  2. Do you have any open wounds or new incisions? No  3. Do you have any recent or upcoming hospitalizations, surgeries or dental procedures? Yes, colonoscopy next Thursday  4. Do you currently have or recently have had any signs of illness or infection or are you on any antibiotics? No  5. Have you had any new, sudden or worsening abdominal pain? No  6. Have you or anyone in your household received a live vaccination in the past 4 weeks? Please note:  No live vaccines while on biologic/chemotherapy until 6 months after the last treatment.  Patient can receive the flu vaccine (shot only) and the pneumovax.  It is optimal for the patient to get these vaccines mid cycle, but they can be given at any time as long as it is not on the day of the infusion. No  7. Have you recently been diagnosed with any new nervous system diseases (ie. Multiple sclerosis, Guillain Crawford, seizures, neurological changes) or cancer diagnosis? no  8. Are you on any form of radiation or chemotherapy? No  9. Are you pregnant or breast feeding or do you have plans of pregnancy in the future? No  10. Have you been  having any signs of worsening depression or suicidal ideations?  (benlysta only) No  11. Have there been any other new onset medical symptoms? No        Post Infusion Assessment:  Patient tolerated infusion without incident.  Blood return noted pre and post infusion.  Site patent and intact, free from redness, edema or discomfort.  No evidence of extravasations.  Access discontinued per protocol.       Discharge Plan:   Discharge instructions reviewed with: Patient.  Patient and/or family verbalized understanding of discharge instructions and all questions answered.  Patient discharged in stable condition accompanied by: self.  Departure Mode: Ambulatory.    Nereida Peres RN

## 2019-11-29 ENCOUNTER — PATIENT OUTREACH (OUTPATIENT)
Dept: GASTROENTEROLOGY | Facility: CLINIC | Age: 54
End: 2019-11-29

## 2019-11-29 ENCOUNTER — TELEPHONE (OUTPATIENT)
Dept: GASTROENTEROLOGY | Facility: CLINIC | Age: 54
End: 2019-11-29

## 2019-11-29 DIAGNOSIS — K50.90 CROHN'S DISEASE (H): Primary | ICD-10-CM

## 2019-11-29 NOTE — PROGRESS NOTES
Checked to see how pt is doing after her 10mg/kg infusion.  Also that will transition to [facility] in January.  Also if her insurance will change as of January 1 to call to update once sh has a new insurance card.  Left my name and number.

## 2019-11-29 NOTE — TELEPHONE ENCOUNTER
Patient Name: Abiola Matute   : 1965  MRN: [id number]       : [x] N/A      VM et [employer] with information needed to complete pre-assessment call.  Request pt contact Endoscopy Pre-assessment RN to complete upcoming procedure information. Telephone call-back number provided.      Instructions resent via [employer]  - This includes Request to contact Pre-Assessment RN et information that may be complete by VM if necessary, Split-dose Golytely  instructions, MAC Instructions, procedure date/time/location/provider.      Onelia Guzman, RN, RN  [facility] Endoscopy    Additional Information regarding appointment:      Patient scheduled for:  [x] EGD  [x] Colonoscopy      Indication for procedure. [x] Crohn's disease of large intestine with fistula    Sedation Type:   [x] MAC       Procedure Provider:  Shakir      Referring Provider. (Shakir); Fabio Ramos    Arrival time verified: .745    Facility location verified:   [x][facility] Endoscopy Unit - [address], [address], [address]    Pt meets medical necessity for outpatient procedure in hospital Endoscopy Unit:     [x] N/A for this Payor (non-BCBS)      Prep Type:   [x]Golytely eRx: (not sent)  [x]NPO /p Golytely    Anticoagulants or blood thinners: [x]None               Electronic implanted devices: [x] No      H&P / Pre op physical completed: [x] Complete, Date 2019 - [facility]    Additional Information: [employer] Active  _______________________________________________

## 2019-11-29 NOTE — PROGRESS NOTES
Contacted to start prior for infusion to transition to [facility] in January. Team will not be able to start prior until January.

## 2019-12-03 ENCOUNTER — HOSPITAL ENCOUNTER (OUTPATIENT)
Dept: BONE DENSITY | Facility: CLINIC | Age: 54
Discharge: HOME | End: 2019-12-03
Attending: INTERNAL MEDICINE | Admitting: INTERNAL MEDICINE
Payer: COMMERCIAL

## 2019-12-03 DIAGNOSIS — K72.90 DECOMPENSATION OF CIRRHOSIS OF LIVER (H): ICD-10-CM

## 2019-12-03 DIAGNOSIS — K74.69 OTHER CIRRHOSIS OF LIVER (H): ICD-10-CM

## 2019-12-03 DIAGNOSIS — K50.113 CROHN'S DISEASE OF LARGE INTESTINE WITH FISTULA (H): ICD-10-CM

## 2019-12-03 DIAGNOSIS — K74.60 DECOMPENSATION OF CIRRHOSIS OF LIVER (H): ICD-10-CM

## 2019-12-03 PROCEDURE — 77080 DXA BONE DENSITY AXIAL: CPT

## 2019-12-04 ENCOUNTER — PATIENT OUTREACH (OUTPATIENT)
Dept: GASTROENTEROLOGY | Facility: CLINIC | Age: 54
End: 2019-12-04

## 2019-12-04 ENCOUNTER — TELEPHONE (OUTPATIENT)
Dept: GASTROENTEROLOGY | Facility: CLINIC | Age: 54
End: 2019-12-04

## 2019-12-04 RX ORDER — BISACODYL 5 MG/1
10 TABLET, DELAYED RELEASE ORAL ONCE
Qty: 4 TABLET | Refills: 0 | Status: SHIPPED | OUTPATIENT
Start: 2019-12-04 | End: 2020-04-21

## 2019-12-04 NOTE — PROGRESS NOTES
Attempted to reach pt to discuss that she can stay at [facility] if she desires. Patient's mailbox is full. Sent a my chart message.            [place] has actually [place] 7 doses VS the one. [place] will not need to transition to [facility] in January.   I can contact I and let them know they will not need to transition the pt unless there is a reason she would prefer [facility].     Please let me know.       Sanjuanita Casillas    Infusion  - [facility]

## 2019-12-04 NOTE — TELEPHONE ENCOUNTER
Patient Name: Abiola Matute   : 1965  MRN: [id number]        : [x]? N/A          Patient scheduled for:  [x]? EGD  [x]? Colonoscopy      Indication for procedure. [x]? Crohn's disease of large intestine with fistula    Sedation Type:   [x]? MAC       Procedure Provider:  Shakir                 Referring Provider. (Shakir); Fabio Ramos    Arrival time verified: .5.745    Facility location verified:   [x]?[facility] Endoscopy Unit - [address], [address], [address]    Pt meets medical necessity for outpatient procedure in hospital Endoscopy Unit:     [x]? N/A for this Payor (non-BCBS)       Prep Type:   [x]?Golytely eRx: [facility] - [place], [place] - [address]   [x]?NPO /p Golytely    Anticoagulants or blood thinners: [x]?None               Electronic implanted devices: [x]? No      H&P / Pre op physical completed: [x]? Complete, Date 2019 - [employer]    Additional Information: [employer] Active  _______________________________________________      Instructions given: [x] Rec'd & Read   [x] Reviewed         Pre procedure teaching completed: [x] Yes - Reviewed    [x] No questions regarding Sedation as ordered    Transportation from procedure & responsible adult to be with patient following procedure for a minimum of 6 hrs (Conscious Sedation) 24 hrs (MAC): [x] Yes  - confirmed will have post-procedure companionship as required    Onelia Guzman, RN, RN`  [facility] End[facility]

## 2019-12-04 NOTE — TELEPHONE ENCOUNTER
Pt called to state that she did not have her prep yet for her colonoscopy. informed pt that the endoscopy pre assessment nurses had reached out to her on November 29. Pt said that she had a new phone so coul not retrieve her voice mail message. Pt could not remember her password and login for my chart so reset both. Left a message for endoscopy pre assessment nurse to contact pt and send prescription.  Pt in agreement with the plan.

## 2019-12-04 NOTE — PROGRESS NOTES
Pt returned call and will like to stay at [facility]. For infusion. Prior team notified.

## 2019-12-04 NOTE — TELEPHONE ENCOUNTER
I contacted pt this am.     Unfortunately the pt's voice mail has been full and also changed phones last week when message from endoscopy pre assessment nurse left a message.  Also pt did not remember her login or password for my chart where messages also had been sent.

## 2019-12-04 NOTE — TELEPHONE ENCOUNTER
[facility]    Phone Message    May a detailed message be left on voicemail: yes    Reason for Call: Medication Question or concern regarding medication   Prescription Clarification  Name of Medication: Golytely   Prescribing Provider: Shakir Real    Pharmacy: [address] [address]   [phone]   What on the order needs clarification? Pt is needing to get medication ASAP, Today, 12/4/2019 Pt Is needing for tomorrow, for Colonoscopy. Pt tates she has left multiple messages trying to get medication, but has not gotten any responses.           Action Taken: Message routed to:  [facility] GASTRO[facility]OLOGY [facility]ULT [facility]

## 2019-12-05 ENCOUNTER — ANESTHESIA EVENT (OUTPATIENT)
Dept: GASTROENTEROLOGY | Facility: CLINIC | Age: 54
End: 2019-12-05
Payer: COMMERCIAL

## 2019-12-05 ENCOUNTER — ANESTHESIA (OUTPATIENT)
Dept: GASTROENTEROLOGY | Facility: CLINIC | Age: 54
End: 2019-12-05
Payer: COMMERCIAL

## 2019-12-05 ENCOUNTER — HOSPITAL ENCOUNTER (OUTPATIENT)
Facility: CLINIC | Age: 54
Discharge: HOME | End: 2019-12-05
Attending: INTERNAL MEDICINE | Admitting: INTERNAL MEDICINE
Payer: COMMERCIAL

## 2019-12-05 VITALS
SYSTOLIC BLOOD PRESSURE: 108 MMHG | HEART RATE: 76 BPM | RESPIRATION RATE: 11 BRPM | DIASTOLIC BLOOD PRESSURE: 66 MMHG | OXYGEN SATURATION: 100 % | TEMPERATURE: 98.4 F

## 2019-12-05 LAB
COLONOSCOPY: NORMAL
UPPER GI ENDOSCOPY: NORMAL

## 2019-12-05 PROCEDURE — 45380 COLONOSCOPY AND BIOPSY: CPT | Performed by: INTERNAL MEDICINE

## 2019-12-05 PROCEDURE — 25800030 ZZH RX IP 258 OP 636: Performed by: NURSE ANESTHETIST, CERTIFIED REGISTERED

## 2019-12-05 PROCEDURE — 25000125 ZZHC RX 250: Performed by: NURSE ANESTHETIST, CERTIFIED REGISTERED

## 2019-12-05 PROCEDURE — 37000008 ZZH ANESTHESIA TECHNICAL FEE, 1ST 30 MIN: Performed by: INTERNAL MEDICINE

## 2019-12-05 PROCEDURE — 37000009 ZZH ANESTHESIA TECHNICAL FEE, EACH ADDTL 15 MIN: Performed by: INTERNAL MEDICINE

## 2019-12-05 PROCEDURE — 25000128 H RX IP 250 OP 636: Performed by: NURSE ANESTHETIST, CERTIFIED REGISTERED

## 2019-12-05 PROCEDURE — 43235 EGD DIAGNOSTIC BRUSH WASH: CPT | Performed by: INTERNAL MEDICINE

## 2019-12-05 PROCEDURE — 88305 TISSUE EXAM BY PATHOLOGIST: CPT | Performed by: INTERNAL MEDICINE

## 2019-12-05 RX ORDER — PROPOFOL 10 MG/ML
INJECTION, EMULSION INTRAVENOUS PRN
Status: DISCONTINUED | OUTPATIENT
Start: 2019-12-05 | End: 2019-12-05

## 2019-12-05 RX ORDER — PROPOFOL 10 MG/ML
INJECTION, EMULSION INTRAVENOUS CONTINUOUS PRN
Status: DISCONTINUED | OUTPATIENT
Start: 2019-12-05 | End: 2019-12-05

## 2019-12-05 RX ORDER — LIDOCAINE HYDROCHLORIDE 20 MG/ML
INJECTION, SOLUTION INFILTRATION; PERINEURAL PRN
Status: DISCONTINUED | OUTPATIENT
Start: 2019-12-05 | End: 2019-12-05

## 2019-12-05 RX ORDER — ONDANSETRON 2 MG/ML
INJECTION INTRAMUSCULAR; INTRAVENOUS PRN
Status: DISCONTINUED | OUTPATIENT
Start: 2019-12-05 | End: 2019-12-05

## 2019-12-05 RX ORDER — LIDOCAINE HYDROCHLORIDE 20 MG/ML
SOLUTION OROPHARYNGEAL PRN
Status: DISCONTINUED | OUTPATIENT
Start: 2019-12-05 | End: 2019-12-05

## 2019-12-05 RX ORDER — SODIUM CHLORIDE, SODIUM LACTATE, POTASSIUM CHLORIDE, CALCIUM CHLORIDE 600; 310; 30; 20 MG/100ML; MG/100ML; MG/100ML; MG/100ML
INJECTION, SOLUTION INTRAVENOUS CONTINUOUS PRN
Status: DISCONTINUED | OUTPATIENT
Start: 2019-12-05 | End: 2019-12-05

## 2019-12-05 RX ORDER — DEXAMETHASONE SODIUM PHOSPHATE 4 MG/ML
INJECTION, SOLUTION INTRA-ARTICULAR; INTRALESIONAL; INTRAMUSCULAR; INTRAVENOUS; SOFT TISSUE PRN
Status: DISCONTINUED | OUTPATIENT
Start: 2019-12-05 | End: 2019-12-05

## 2019-12-05 RX ORDER — FENTANYL CITRATE 50 UG/ML
INJECTION, SOLUTION INTRAMUSCULAR; INTRAVENOUS PRN
Status: DISCONTINUED | OUTPATIENT
Start: 2019-12-05 | End: 2019-12-05

## 2019-12-05 RX ADMIN — LIDOCAINE HYDROCHLORIDE 40 MG: 20 INJECTION, SOLUTION INFILTRATION; PERINEURAL at 09:00

## 2019-12-05 RX ADMIN — PROPOFOL 20 MG: 10 INJECTION, EMULSION INTRAVENOUS at 09:10

## 2019-12-05 RX ADMIN — LIDOCAINE HYDROCHLORIDE 10 ML: 20 SOLUTION ORAL; TOPICAL at 09:00

## 2019-12-05 RX ADMIN — DEXAMETHASONE SODIUM PHOSPHATE 4 MG: 4 INJECTION, SOLUTION INTRA-ARTICULAR; INTRALESIONAL; INTRAMUSCULAR; INTRAVENOUS; SOFT TISSUE at 09:00

## 2019-12-05 RX ADMIN — PROPOFOL 100 MCG/KG/MIN: 10 INJECTION, EMULSION INTRAVENOUS at 09:00

## 2019-12-05 RX ADMIN — MIDAZOLAM 2 MG: 1 INJECTION INTRAMUSCULAR; INTRAVENOUS at 08:50

## 2019-12-05 RX ADMIN — PHENYLEPHRINE HYDROCHLORIDE 100 MCG: 10 INJECTION INTRAVENOUS at 09:28

## 2019-12-05 RX ADMIN — PHENYLEPHRINE HYDROCHLORIDE 50 MCG: 10 INJECTION INTRAVENOUS at 09:22

## 2019-12-05 RX ADMIN — TOPICAL ANESTHETIC 1 SPRAY: 200 SPRAY DENTAL; PERIODONTAL at 09:00

## 2019-12-05 RX ADMIN — SODIUM CHLORIDE, POTASSIUM CHLORIDE, SODIUM LACTATE AND CALCIUM CHLORIDE: 600; 310; 30; 20 INJECTION, SOLUTION INTRAVENOUS at 08:50

## 2019-12-05 RX ADMIN — FENTANYL CITRATE 50 MCG: 50 INJECTION, SOLUTION INTRAMUSCULAR; INTRAVENOUS at 08:56

## 2019-12-05 RX ADMIN — FENTANYL CITRATE 25 MCG: 50 INJECTION, SOLUTION INTRAMUSCULAR; INTRAVENOUS at 09:05

## 2019-12-05 RX ADMIN — ONDANSETRON 4 MG: 2 INJECTION INTRAMUSCULAR; INTRAVENOUS at 09:00

## 2019-12-05 RX ADMIN — PHENYLEPHRINE HYDROCHLORIDE 50 MCG: 10 INJECTION INTRAVENOUS at 09:25

## 2019-12-05 NOTE — ANESTHESIA PREPROCEDURE EVALUATION
Anesthesia Pre-Procedure Evaluation    Patient: Abiola Matute   MRN:     [id number] Gender:   female   Age:    54 year old :      1965        Preoperative Diagnosis: Crohn's disease of large intestine with fistula (H) [K50.113]   Procedure(s):  COLONOSCOPY  ESOPHAGOGASTRODUODENOSCOPY (EGD)     Past Medical History:   Diagnosis Date     Atypical ductal hyperplasia of breast 9/10/10    ERT not recommended -left - and flat epithelial atypia-scheduled for breast biopsy 2010      BIFID UVULA (aka UVULA)       Contact dermatitis and other eczema, due to unspecified cause     perianal  - recurrent - clobetasol      Hypertension      IBS (irritable bowel syndrome)      Obesity, unspecified      Other isolated or specific phobias     fear of flying - gets Ativan prn.       Past Surgical History:   Procedure Laterality Date     BIOPSY ANAL N/A 3/28/2019    anal biopsy and culure placement of seton - Dr Fleming     BREAST BIOPSY, RT/LT  2010    left - scheduled with Dr. Kojo WILKERSON APPENDECTOMY  at age 14     COLONOSCOPY       COLONOSCOPY N/A 2014    Procedure: COMBINED COLONOSCOPY, SINGLE OR MULTIPLE BIOPSY/POLYPECTOMY BY BIOPSY;  Surgeon: Diane Fleming MD;  Location:  GI     EXAM UNDER ANESTHESIA ANUS N/A 3/28/2019    Procedure: EXAM UNDER ANESTHESIA ANUS;  Surgeon: Diane Fleming MD;  Location:  OR      CLOSED TX BIMALLEOLAR ANKLE FX W/O MANIPULATION  at age 27    left ankle ORIF, plates and screws removed at age 36     HYSTERECTOMY, VAGINAL      with Dr. Licha Zhou - with BSO for fibroids      SURGICAL HISTORY OF -   4/15/2010    Pelviscopy with removal of bilateral hydrosalpinges.                [facility] PHYSICAL EXAM    LABS:  CBC:   Lab Results   Component Value Date    WBC 3.6 (L) 2019    WBC 4.1 2019    HGB 13.1 2019    HGB 14.1 2019    HCT 37.6 2019    HCT 42.4 2019     (L) 2019     (L) 2019  "    BMP:   Lab Results   Component Value Date     11/22/2019     10/23/2019    POTASSIUM 3.8 11/22/2019    POTASSIUM 3.5 10/23/2019    CHLORIDE 105 11/22/2019    CHLORIDE 104 10/23/2019    CO2 21 11/22/2019    CO2 25 10/23/2019    BUN 8 11/22/2019    BUN 9 10/23/2019    CR 0.60 11/22/2019    CR 0.59 10/23/2019    GLC 79 11/22/2019     (H) 10/23/2019     COAGS:   Lab Results   Component Value Date    INR 1.33 (H) 11/22/2019     POC:   Lab Results   Component Value Date     (H) 10/23/2019    HCG Negative 06/07/2006     OTHER:   Lab Results   Component Value Date    A1C 4.6 09/27/2017    ARIEL 8.4 (L) 11/22/2019    MAG 2.0 05/21/2014    ALBUMIN 2.4 (L) 11/22/2019    PROTTOTAL 8.8 11/22/2019    ALT 42 11/22/2019    AST 69 (H) 11/22/2019     (H) 11/02/2017    ALKPHOS 118 11/22/2019    BILITOTAL 1.8 (H) 11/22/2019    LIPASE 554 (H) 10/21/2019    TSH 1.56 12/07/2018    CRP 12.8 (H) 11/12/2019     (H) 11/27/2019        Preop Vitals    BP Readings from Last 3 Encounters:   11/27/19 111/72   11/26/19 114/69   11/26/19 117/73    Pulse Readings from Last 3 Encounters:   11/27/19 72   11/26/19 111   11/26/19 99      Resp Readings from Last 3 Encounters:   11/27/19 16   10/23/19 18   10/22/19 20    SpO2 Readings from Last 3 Encounters:   11/26/19 100%   11/12/19 99%   10/23/19 98%      Temp Readings from Last 1 Encounters:   11/27/19 37.1  C (98.7  F) (Oral)    Ht Readings from Last 1 Encounters:   11/27/19 1.676 m (5' 5.98\")      Wt Readings from Last 1 Encounters:   11/27/19 70.5 kg (155 lb 8 oz)    Estimated body mass index is 25.11 kg/m  as calculated from the following:    Height as of 11/27/19: 1.676 m (5' 5.98\").    Weight as of 11/27/19: 70.5 kg (155 lb 8 oz).     LDA:  Peripheral IV 10/22/19 Left Lower forearm (Active)   Number of days: 44       Peripheral IV 11/27/19 Left Hand (Active)   Number of days: 8        Assessment:   ASA SCORE: 3    H&P: History and physical reviewed and " following examination; no interval change.         Plan:   Anes. Type:  MAC   Pre-Medication: None   Induction:  N/a   Airway: Native Airway   Access/Monitoring: PIV   Maintenance: N/a     Postop Plan:   Postop Pain: None  Postop Sedation/Airway: Not planned     PONV Management: Adult Risk Factors: Female     CONSENT: Direct conversation   Plan and risks discussed with: Patient   Blood Products: Consent Deferred (Minimal Blood Loss)               Chart reviewed and patient examined. Plan discussed with patient.   MD Kvng King MD

## 2019-12-05 NOTE — ANESTHESIA CARE TRANSFER NOTE
Patient: Abiola Matute    Procedure(s):  COLONOSCOPY, WITH POLYPECTOMY AND BIOPSY  ESOPHAGOGASTRODUODENOSCOPY (EGD)    Diagnosis: Crohn's disease of large intestine with fistula (H) [K50.113]  Diagnosis Additional Information: No value filed.    Anesthesia Type:   MAC     Note:  Airway :Room Air  Patient transferred to:PACU (endo)  Handoff Report: Identifed the Patient, Identified the Reponsible Provider, Reviewed the pertinent medical history, Discussed the surgical course, Reviewed Intra-OP anesthesia mangement and issues during anesthesia, Set expectations for post-procedure period and Allowed opportunity for questions and acknowledgement of understanding      Vitals: (Last set prior to Anesthesia Care Transfer)    CRNA VITALS  12/5/2019 0934 - 12/5/2019 1032      12/5/2019             Pulse:  101    Ht Rate:  99    SpO2:  99 %                Electronically Signed By: DARYN Herrera CRNA  December 5, 2019  10:32 AM

## 2019-12-05 NOTE — ANESTHESIA POSTPROCEDURE EVALUATION
Anesthesia POST Procedure Evaluation    Patient: Abiola Matute   MRN:     [id number] Gender:   female   Age:    54 year old :      1965        Preoperative Diagnosis: Crohn's disease of large intestine with fistula (H) [K50.113]   Procedure(s):  COLONOSCOPY, WITH POLYPECTOMY AND BIOPSY  ESOPHAGOGASTRODUODENOSCOPY (EGD)   Postop Comments: No value filed.       Anesthesia Type:  Not documented  MAC    Reportable Event: NO     PAIN: Uncomplicated   Sign Out status: Comfortable, Well controlled pain     PONV: No PONV   Sign Out status:  No Nausea or Vomiting     Neuro/Psych: Uneventful perioperative course   Sign Out Status: Preoperative baseline; Age appropriate mentation     Airway/Resp.: Uneventful perioperative course   Sign Out Status: Non labored breathing, age appropriate RR; Resp. Status within EXPECTED Parameters     CV: Uneventful perioperative course   Sign Out status: Appropriate BP and perfusion indices; Appropriate HR/Rhythm     Disposition:   Sign Out in:  PACU  Disposition:  Phase II; Home  Recovery Course: Uneventful  Follow-Up: Not required           Last Anesthesia Record Vitals:  CRNA VITALS  2019 0934 - 2019 1020      2019             Pulse:  101    Ht Rate:  99    SpO2:  99 %          Last PACU Vitals:  Vitals Value Taken Time   BP 91/60 2019 10:08 AM   Temp     Pulse 90 2019 10:08 AM   Resp     SpO2     Temp src Available 2019 10:00 AM   NIBP 92/54 2019 10:01 AM   Pulse 101 2019 10:03 AM   SpO2 99 % 2019 10:03 AM   Resp     Temp     Ht Rate 99 2019 10:03 AM   Temp 2     Vitals shown include unvalidated device data.      Electronically Signed By: Kvng Chow MD, 2019, 10:20 AM

## 2019-12-05 NOTE — ANESTHESIA POSTPROCEDURE EVALUATION
Anesthesia POST Procedure Evaluation    Patient: Abiola Matute   MRN:     [id number] Gender:   female   Age:    54 year old :      1965        Preoperative Diagnosis: Crohn's disease of large intestine with fistula (H) [K50.113]   Procedure(s):  COLONOSCOPY, WITH POLYPECTOMY AND BIOPSY  ESOPHAGOGASTRODUODENOSCOPY (EGD)   Postop Comments: No value filed.       Anesthesia Type:  Not documented  MAC    Reportable Event: NO     PAIN: Uncomplicated   Sign Out status: Comfortable, Well controlled pain     PONV: No PONV   Sign Out status:  No Nausea or Vomiting     Neuro/Psych: Uneventful perioperative course   Sign Out Status: Preoperative baseline; Age appropriate mentation     Airway/Resp.: Uneventful perioperative course   Sign Out Status: Non labored breathing, age appropriate RR; Resp. Status within EXPECTED Parameters     CV: Uneventful perioperative course   Sign Out status: Appropriate BP and perfusion indices; Appropriate HR/Rhythm     Disposition:   Sign Out in:  PACU  Disposition:  Phase II; Home  Recovery Course: Uneventful  Follow-Up: Not required           Last Anesthesia Record Vitals:  CRNA VITALS  2019 0934 - 2019 1031      2019             Pulse:  101    Ht Rate:  99    SpO2:  99 %          Last PACU Vitals:  Vitals Value Taken Time   BP 91/60 2019 10:08 AM   Temp     Pulse 90 2019 10:08 AM   Resp     SpO2     Temp src Available 2019 10:00 AM   NIBP 92/54 2019 10:01 AM   Pulse 101 2019 10:03 AM   SpO2 99 % 2019 10:03 AM   Resp     Temp     Ht Rate 99 2019 10:03 AM   Temp 2     Vitals shown include unvalidated device data.      Electronically Signed By: Kvng Chow MD, 2019, 10:31 AM

## 2019-12-05 NOTE — DISCHARGE INSTRUCTIONS
Discharge Instructions after Colonoscopy  or Sigmoidoscopy    Today you had a __x__ Colonoscopy ____ Sigmoidoscopy    Activity and Diet  You were given medicine for pain. You may be dizzy or sleepy.  For 24 hours:    Do not drive or use heavy equipment.    Do not make important decisions.    Do not drink any alcohol.  You may return to your normal diet and medicines.    Discomfort    Air was placed in your colon during the exam in order to see it. Walking helps to pass the air.    You may take Tylenol (acetaminophen) for pain unless your doctor has told you not to.  Do not take aspirin or ibuprofen (Advil, Motrin, or other anti-inflammatory  drugs) for __3___ days.    Follow-up  __x__ We took small tissue samples or polyps to study. Your doctor will call you with the results  within two weeks.    When to call:    Call right away if you have:    Unusual pain in belly or chest pain not relieved with passing air.    More than 1 to 2 Tablespoons of bleeding from your rectum.    Fever above 100.6  F (37.5  C).    If you have severe pain, bleeding, or shortness of breath, go to an emergency room.    If you have questions, call:  Monday to Friday, 7 a.m. to 4:30 p.m.  Endoscopy: [phone] (We may have to call you back)    After hours  Hospital: [phone] (Ask for the GI fellow on call)

## 2019-12-06 LAB — COPATH REPORT: NORMAL

## 2019-12-17 ENCOUNTER — HOME INFUSION (PRE-WILLOW HOME INFUSION) (OUTPATIENT)
Dept: PHARMACY | Facility: CLINIC | Age: 54
End: 2019-12-17

## 2019-12-18 ENCOUNTER — PATIENT OUTREACH (OUTPATIENT)
Dept: GASTROENTEROLOGY | Facility: CLINIC | Age: 54
End: 2019-12-18

## 2019-12-18 NOTE — PROGRESS NOTES
This is a recent snapshot of the patient's [facility] medical record.  For current drug dose and complete information and questions, call [phone] or In [facility], [facility] home infusion ([id number])  CSN Number:  [id number]

## 2019-12-18 NOTE — PROGRESS NOTES
Returned voice mail message from pt asking to have her medications refilled. Not sure which medications she is requesting to be filled. Suggested that she will names of medications on my voice mail or my chart or contact her pharmacy with Dr. Schilling name as proscriber and our fax number. Left my name and number. Pt had been having meds refilled by [facility] provider.

## 2020-01-16 ENCOUNTER — ANCILLARY PROCEDURE (OUTPATIENT)
Dept: MAMMOGRAPHY | Facility: CLINIC | Age: 55
End: 2020-01-16
Attending: NURSE PRACTITIONER
Payer: COMMERCIAL

## 2020-01-16 DIAGNOSIS — Z12.39 SCREENING FOR BREAST CANCER: ICD-10-CM

## 2020-01-16 PROCEDURE — 77067 SCR MAMMO BI INCL CAD: CPT | Mod: TC

## 2020-01-16 PROCEDURE — 77063 BREAST TOMOSYNTHESIS BI: CPT | Mod: TC

## 2020-01-16 NOTE — RESULT ENCOUNTER NOTE
Dear [place],     -Mammogram was normal.  ADVISE: rechecking in 1 year.      Please send a [employer] message or call [phone]  if you have any questions.      DARYN Lucas, CNP  [facility]    If you have further questions about the interpretation of your labs, [email or web address] is a good website to check out for further information.

## 2020-01-22 ENCOUNTER — INFUSION THERAPY VISIT (OUTPATIENT)
Dept: INFUSION THERAPY | Facility: CLINIC | Age: 55
End: 2020-01-22
Attending: INTERNAL MEDICINE
Payer: COMMERCIAL

## 2020-01-22 ENCOUNTER — HOSPITAL ENCOUNTER (OUTPATIENT)
Facility: CLINIC | Age: 55
Setting detail: SPECIMEN
Discharge: HOME | End: 2020-01-22
Attending: INTERNAL MEDICINE | Admitting: INTERNAL MEDICINE
Payer: COMMERCIAL

## 2020-01-22 ENCOUNTER — PATIENT OUTREACH (OUTPATIENT)
Dept: GASTROENTEROLOGY | Facility: CLINIC | Age: 55
End: 2020-01-22

## 2020-01-22 ENCOUNTER — TRANSFERRED RECORDS (OUTPATIENT)
Dept: HEALTH INFORMATION MANAGEMENT | Facility: CLINIC | Age: 55
End: 2020-01-22

## 2020-01-22 VITALS
OXYGEN SATURATION: 100 % | SYSTOLIC BLOOD PRESSURE: 117 MMHG | WEIGHT: 161.4 LBS | HEART RATE: 80 BPM | HEIGHT: 66 IN | RESPIRATION RATE: 16 BRPM | BODY MASS INDEX: 25.94 KG/M2 | DIASTOLIC BLOOD PRESSURE: 77 MMHG | TEMPERATURE: 97.8 F

## 2020-01-22 DIAGNOSIS — K50.119 CROHN'S DISEASE OF PERIANAL REGION WITH COMPLICATION (H): Primary | ICD-10-CM

## 2020-01-22 DIAGNOSIS — K50.113 CROHN'S DISEASE OF LARGE INTESTINE WITH FISTULA (H): Primary | ICD-10-CM

## 2020-01-22 DIAGNOSIS — K50.113 CROHN'S DISEASE OF LARGE INTESTINE WITH FISTULA (H): ICD-10-CM

## 2020-01-22 LAB
ALBUMIN SERPL-MCNC: 2.9 G/DL (ref 3.4–5)
ALP SERPL-CCNC: 135 U/L (ref 40–150)
ALT SERPL W P-5'-P-CCNC: 43 U/L (ref 0–50)
AST SERPL W P-5'-P-CCNC: 73 U/L (ref 0–45)
BASOPHILS # BLD AUTO: 0 10E9/L (ref 0–0.2)
BASOPHILS NFR BLD AUTO: 0.7 %
BILIRUB DIRECT SERPL-MCNC: 0.7 MG/DL (ref 0–0.2)
BILIRUB SERPL-MCNC: 2.2 MG/DL (ref 0.2–1.3)
CRP SERPL-MCNC: 6.7 MG/L (ref 0–8)
DIFFERENTIAL METHOD BLD: ABNORMAL
EOSINOPHIL # BLD AUTO: 0.1 10E9/L (ref 0–0.7)
EOSINOPHIL NFR BLD AUTO: 4.6 %
ERYTHROCYTE [DISTWIDTH] IN BLOOD BY AUTOMATED COUNT: 14.6 % (ref 10–15)
ERYTHROCYTE [SEDIMENTATION RATE] IN BLOOD BY WESTERGREN METHOD: 59 MM/H (ref 0–30)
HCT VFR BLD AUTO: 40.4 % (ref 35–47)
HGB BLD-MCNC: 13.8 G/DL (ref 11.7–15.7)
IMM GRANULOCYTES # BLD: 0 10E9/L (ref 0–0.4)
IMM GRANULOCYTES NFR BLD: 0 %
LYMPHOCYTES # BLD AUTO: 1.1 10E9/L (ref 0.8–5.3)
LYMPHOCYTES NFR BLD AUTO: 36.8 %
MCH RBC QN AUTO: 34.8 PG (ref 26.5–33)
MCHC RBC AUTO-ENTMCNC: 34.2 G/DL (ref 31.5–36.5)
MCV RBC AUTO: 102 FL (ref 78–100)
MONOCYTES # BLD AUTO: 0.2 10E9/L (ref 0–1.3)
MONOCYTES NFR BLD AUTO: 7.7 %
NEUTROPHILS # BLD AUTO: 1.4 10E9/L (ref 1.6–8.3)
NEUTROPHILS NFR BLD AUTO: 50.2 %
NRBC # BLD AUTO: 0 10*3/UL
NRBC BLD AUTO-RTO: 0 /100
PLATELET # BLD AUTO: 109 10E9/L (ref 150–450)
PROT SERPL-MCNC: 8.2 G/DL (ref 6.8–8.8)
RBC # BLD AUTO: 3.96 10E12/L (ref 3.8–5.2)
WBC # BLD AUTO: 2.9 10E9/L (ref 4–11)

## 2020-01-22 PROCEDURE — 85652 RBC SED RATE AUTOMATED: CPT | Performed by: INTERNAL MEDICINE

## 2020-01-22 PROCEDURE — 25000128 H RX IP 250 OP 636: Performed by: INTERNAL MEDICINE

## 2020-01-22 PROCEDURE — 85025 COMPLETE CBC W/AUTO DIFF WBC: CPT | Performed by: INTERNAL MEDICINE

## 2020-01-22 PROCEDURE — 96415 CHEMO IV INFUSION ADDL HR: CPT

## 2020-01-22 PROCEDURE — 80076 HEPATIC FUNCTION PANEL: CPT | Performed by: INTERNAL MEDICINE

## 2020-01-22 PROCEDURE — 96413 CHEMO IV INFUSION 1 HR: CPT

## 2020-01-22 PROCEDURE — 40001033 ZZHCL STATISTIC INFLIXIMAB: Performed by: INTERNAL MEDICINE

## 2020-01-22 PROCEDURE — 86140 C-REACTIVE PROTEIN: CPT | Performed by: INTERNAL MEDICINE

## 2020-01-22 PROCEDURE — 25800030 ZZH RX IP 258 OP 636: Performed by: INTERNAL MEDICINE

## 2020-01-22 RX ORDER — ACETAMINOPHEN 325 MG/1
650 TABLET ORAL ONCE
Status: CANCELLED
Start: 2020-02-05

## 2020-01-22 RX ORDER — DIPHENHYDRAMINE HCL 25 MG
25 CAPSULE ORAL ONCE
Status: CANCELLED
Start: 2020-02-05

## 2020-01-22 RX ADMIN — INFLIXIMAB 700 MG: 100 INJECTION, POWDER, LYOPHILIZED, FOR SOLUTION INTRAVENOUS at 11:23

## 2020-01-22 ASSESSMENT — MIFFLIN-ST. JEOR: SCORE: 1343.61

## 2020-01-22 NOTE — PROGRESS NOTES
Infusion Nursing Note:  Abiola Matute presents today for Remicade.    Patient seen by provider today: No   present during visit today: Not Applicable.    Note: Remicade level drawn.    Intravenous Access:  Labs drawn without difficulty.  Peripheral IV placed.    Treatment Conditions:  Biological Infusion Checklist:  ~~~ NOTE: If the patient answers yes to any of the questions below, hold the infusion and contact ordering provider or on-call provider.    1. Have you recently had an elevated temperature, fever, chills, productive cough, coughing for 3 weeks or longer or hemoptysis, abnormal vital signs, night sweats,  chest pain or have you noticed a decrease in your appetite, unexplained weight loss or fatigue? No  2. Do you have any open wounds or new incisions? No  3. Do you have any recent or upcoming hospitalizations, surgeries or dental procedures? No  4. Do you currently have or recently have had any signs of illness or infection or are you on any antibiotics? No  5. Have you had any new, sudden or worsening abdominal pain? No  6. Have you or anyone in your household received a live vaccination in the past 4 weeks? Please note:  No live vaccines while on biologic/chemotherapy until 6 months after the last treatment.  Patient can receive the flu vaccine (shot only) and the pneumovax.  It is optimal for the patient to get these vaccines mid cycle, but they can be given at any time as long as it is not on the day of the infusion. No  7. Have you recently been diagnosed with any new nervous system diseases (ie. Multiple sclerosis, Guillain Trumbull, seizures, neurological changes) or cancer diagnosis? No  8. Are you on any form of radiation or chemotherapy? No  9. Are you pregnant or breast feeding or do you have plans of pregnancy in the future? No  10. Have you been having any signs of worsening depression or suicidal ideations?  (benlysta only) No  11. Have there been any other new onset medical  symptoms? No        Post Infusion Assessment:  Patient tolerated infusion without incident.  Blood return noted pre and post infusion.  Site patent and intact, free from redness, edema or discomfort.  No evidence of extravasations.  Access discontinued per protocol.       Discharge Plan:   Discharge instructions reviewed with: Patient.  Patient and/or family verbalized understanding of discharge instructions and all questions answered.  AVS to patient via [employer].  Patient will return in 8 weeks for next appointment.   Patient discharged in stable condition accompanied by: self.  Departure Mode: Ambulatory.    Gaye Hurt RN

## 2020-01-22 NOTE — PROGRESS NOTES
infliximab level to be drawn today prior to start of infusion. Order placed. Therapy plan edited.   Label sst with at least two patient identifiers and date of collection(acceptable identifiers name, ,medical record # , requisition #)  Collect 5-7 ml peripheral blood in SST  Place SST upright;let stand undisturbed minimum 30 minutes)  Centrifuge at FULL SPEED (2215-0352) for minimum 15 minutes. Observe separation of the serum from the clot  Package the labeled SST tube in biohazard bag.  Place with completed requisition and frozen cold pack inside kit.  SHIP IMMEDIATELY OR REFRIGERATE OVERNIGHT  Blood to be sent [facility]  [facility]  form completed and scanned into the record.

## 2020-01-23 ENCOUNTER — MYC MEDICAL ADVICE (OUTPATIENT)
Dept: GASTROENTEROLOGY | Facility: CLINIC | Age: 55
End: 2020-01-23

## 2020-01-23 ENCOUNTER — TELEPHONE (OUTPATIENT)
Dept: GASTROENTEROLOGY | Facility: CLINIC | Age: 55
End: 2020-01-23

## 2020-01-23 RX ORDER — FUROSEMIDE 40 MG
40 TABLET ORAL DAILY
Status: CANCELLED | OUTPATIENT
Start: 2020-01-23

## 2020-01-23 RX ORDER — FUROSEMIDE 40 MG
40 TABLET ORAL DAILY
Qty: 30 TABLET | OUTPATIENT
Start: 2020-01-23

## 2020-01-23 RX ORDER — SPIRONOLACTONE 100 MG/1
100 TABLET, FILM COATED ORAL DAILY
Status: CANCELLED | OUTPATIENT
Start: 2020-01-23

## 2020-01-23 RX ORDER — SPIRONOLACTONE 100 MG/1
100 TABLET, FILM COATED ORAL DAILY
Qty: 30 TABLET | Refills: 0 | OUTPATIENT
Start: 2020-01-23

## 2020-01-23 NOTE — TELEPHONE ENCOUNTER
[facility] Call Center    Phone Message    May a detailed message be left on voicemail: yes    Reason for Call: Other: Patient calling stating she may not be able to make her appointment scheduled for 1/28/20 and is wondering if it would be ok to wait until Dr. Floress next avail in April. please call to discuss thank you.      Action Taken: Message routed to:  [facility] gastro

## 2020-01-24 ENCOUNTER — MYC MEDICAL ADVICE (OUTPATIENT)
Dept: FAMILY MEDICINE | Facility: CLINIC | Age: 55
End: 2020-01-24

## 2020-01-24 NOTE — TELEPHONE ENCOUNTER
"Requested Prescriptions   Pending Prescriptions Disp Refills     spironolactone (ALDACTONE) 100 MG tablet       Sig: Take 1 tablet (100 mg) by mouth daily       Last Refill:    Disp Refills Start End MADAN   spironolactone (ALDACTONE) 100 MG tablet   10/16/2019  --   Sig - Route: Take 100 mg by mouth daily  - Oral   Class: Historical     Diuretics (Including Combos) Protocol Passed - 1/23/2020  5:44 PM        Passed - Blood pressure under 140/90 in past 12 months     BP Readings from Last 3 Encounters:   01/22/20 117/77   12/05/19 108/66   11/27/19 111/72           Passed - Recent (12 mo) or future (30 days) visit within the authorizing provider's specialty     Patient has had an office visit with the authorizing provider or a provider within the authorizing providers department within the previous 12 mos or has a future within next 30 days. See \"Patient Info\" tab in inbasket, or \"Choose Columns\" in Meds & Orders section of the refill encounter.      LOV: 11/26/2019          Passed - Medication is active on med list        Passed - Patient is age 18 or older        Passed - No active pregancy on record        Passed - Normal serum creatinine on file in past 12 months     Recent Labs   Lab Test 11/22/19  0830   CR 0.60            Passed - Normal serum potassium on file in past 12 months     Recent Labs   Lab Test 11/22/19  0830   POTASSIUM 3.8            Passed - Normal serum sodium on file in past 12 months     Recent Labs   Lab Test 11/22/19  0830               Passed - No positive pregnancy test in past 12 months        furosemide (LASIX) 40 MG tablet       Sig: Take 1 tablet (40 mg) by mouth daily       Last Refill:    Disp Refills Start End MADAN   furosemide (LASIX) 40 MG tablet   10/16/2019  --   Sig - Route: Take 40 mg by mouth daily  - Oral   Class: Historical     Diuretics (Including Combos) Protocol Passed - 1/23/2020  5:44 PM        Passed - Blood pressure under 140/90 in past 12 months     BP Readings " "from Last 3 Encounters:   01/22/20 117/77   12/05/19 108/66   11/27/19 111/72           Passed - Recent (12 mo) or future (30 days) visit within the authorizing provider's specialty     Patient has had an office visit with the authorizing provider or a provider within the authorizing providers department within the previous 12 mos or has a future within next 30 days. See \"Patient Info\" tab in inbasket, or \"Choose Columns\" in Meds & Orders section of the refill encounter.      LOV: 11/26/2019          Passed - Medication is active on med list        Passed - Patient is age 18 or older        Passed - No active pregancy on record        Passed - Normal serum creatinine on file in past 12 months     Recent Labs   Lab Test 11/22/19  0830   CR 0.60            Passed - Normal serum potassium on file in past 12 months     Recent Labs   Lab Test 11/22/19  0830   POTASSIUM 3.8            Passed - Normal serum sodium on file in past 12 months     Recent Labs   Lab Test 11/22/19  0830               Passed - No positive pregnancy test in past 12 months        Rx's are historical - need to verify    Attempt #1  MyChart Message sent  Awaiting response    Susan Lezama RN  [facility]  "

## 2020-01-24 NOTE — TELEPHONE ENCOUNTER
MyCchris Message sent  Awaiting response    See refill encounter    Susan Lezama RN  [facility]

## 2020-01-27 ENCOUNTER — TELEPHONE (OUTPATIENT)
Dept: GASTROENTEROLOGY | Facility: CLINIC | Age: 55
End: 2020-01-27

## 2020-01-27 ENCOUNTER — PATIENT OUTREACH (OUTPATIENT)
Dept: GASTROENTEROLOGY | Facility: CLINIC | Age: 55
End: 2020-01-27

## 2020-01-27 NOTE — TELEPHONE ENCOUNTER
Spoke to patient reminding of appointment scheduled on 1/28/20 at 0730 with [facility]. Patient to arrive 15 min early. To reschedule or cancel patient to call [phone].    ALEJA Jarquin

## 2020-01-27 NOTE — TELEPHONE ENCOUNTER
Pt had left a message that she needs to cancel her appt tomorrow at 730 as she will be out of town. Left her a message that cancelled. Pt wanted to know if April appt would be okay. Left a message to call back as would overbook her as her labs are abnormal.

## 2020-01-28 ENCOUNTER — TELEPHONE (OUTPATIENT)
Dept: GASTROENTEROLOGY | Facility: CLINIC | Age: 55
End: 2020-01-28

## 2020-01-28 NOTE — TELEPHONE ENCOUNTER
[facility] Call Center    Phone Message    May a detailed message be left on voicemail: yes    Reason for Call: Other: The diagnosis code, ICD-10 is needed. Please call Nichol.     Action Taken: Message routed to:  [facility]  GI clinic

## 2020-01-30 ENCOUNTER — MYC MEDICAL ADVICE (OUTPATIENT)
Dept: GASTROENTEROLOGY | Facility: CLINIC | Age: 55
End: 2020-01-30

## 2020-01-30 ENCOUNTER — PATIENT OUTREACH (OUTPATIENT)
Dept: GASTROENTEROLOGY | Facility: CLINIC | Age: 55
End: 2020-01-30

## 2020-01-30 DIAGNOSIS — K72.90 DECOMPENSATION OF CIRRHOSIS OF LIVER (H): Primary | ICD-10-CM

## 2020-01-30 DIAGNOSIS — K74.60 DECOMPENSATION OF CIRRHOSIS OF LIVER (H): Primary | ICD-10-CM

## 2020-01-30 RX ORDER — SPIRONOLACTONE 100 MG/1
100 TABLET, FILM COATED ORAL DAILY
Qty: 30 TABLET | Refills: 3 | Status: SHIPPED | OUTPATIENT
Start: 2020-01-30 | End: 2020-03-12

## 2020-01-30 RX ORDER — FUROSEMIDE 40 MG
40 TABLET ORAL DAILY
Qty: 30 TABLET | Refills: 3 | Status: SHIPPED | OUTPATIENT
Start: 2020-01-30 | End: 2020-03-12

## 2020-01-30 NOTE — PROGRESS NOTES
Pt called to schedule appt as pt was out of town and had to reschedule.  Pt requesting refill for Lasix and Aldactone. Will forward to hepatology.

## 2020-02-06 ENCOUNTER — TELEPHONE (OUTPATIENT)
Dept: GASTROENTEROLOGY | Facility: CLINIC | Age: 55
End: 2020-02-06

## 2020-02-06 NOTE — TELEPHONE ENCOUNTER
Spoke to patient reminding of appointment scheduled on 2/10/20 at 220pm with [facility]. Patient to arrive 15 min early. To reschedule or cancel patient to call [phone].    ALEJA Jarquin

## 2020-02-12 ENCOUNTER — PATIENT OUTREACH (OUTPATIENT)
Dept: GASTROENTEROLOGY | Facility: CLINIC | Age: 55
End: 2020-02-12

## 2020-02-12 NOTE — PROGRESS NOTES
Contacted pt about plan and left a message. Also pt needs to reschedule follow up appt.   Pt now scheduled with Ms. Serrato on February 19 at 220 pm    Pt wanting to know if she can have a referral to colon and rectal.           Farhan Schilling MD [place], Ann, RN             No - not at this time - no changes     Her ileum and colon were normal     She just needs to continue and follow-up in clinic

## 2020-02-13 ENCOUNTER — PATIENT OUTREACH (OUTPATIENT)
Dept: GASTROENTEROLOGY | Facility: CLINIC | Age: 55
End: 2020-02-13

## 2020-02-13 DIAGNOSIS — K50.90 CROHN'S DISEASE (H): Primary | ICD-10-CM

## 2020-02-21 NOTE — TELEPHONE ENCOUNTER
RECORDS RECEIVED FROM: [facility]- Dr. Farhan Schilling    DATE RECEIVED: 4/22/2020   NOTES STATUS DETAILS   OFFICE NOTE from referring provider  Internal 2/13/2020 Referral   11/12/19 Office visit with Dr. Schilling    OFFICE NOTE from other specialist   Internal  11/26/19 Office visit with Dr. Jeannette Cervantes ([facility] Hepatology)    DISCHARGE SUMMARY from hospital  N/A    DISCHARGE REPORT from the ER N/A    OPERATIVE REPORT  Internal    MEDICATION LIST Internal    LABS     PFC TESTING N/A    ANAL PAP N/A    BIOPSIES/PATHOLOGY RELATED TO DIAGNOSIS Internal    DIAGNOSTIC PROCEDURES     COLONOSCOPY Internal 12/5/19 ([facility])  10/23/18 ([facility])     UPPER ENDOSCOPY (EGD) Internal EGD: 12/5/19 ([facility])    FLEX SIGMOIDOSCOPY  Received 3/7/19 ([facility])    ERCP N/A    IMAGING (DISC & REPORT)      CT  Internal CT Abdomen Pelvis: 2/22/19   MRI N/A    XRAY N/A    ULTRASOUND (ENDOANAL/ENDORECTAL) Internal US Abdomen: 10/21/19, 10/9/19

## 2020-03-12 ENCOUNTER — TELEPHONE (OUTPATIENT)
Dept: GASTROENTEROLOGY | Facility: CLINIC | Age: 55
End: 2020-03-12

## 2020-03-12 DIAGNOSIS — K72.90 DECOMPENSATION OF CIRRHOSIS OF LIVER (H): ICD-10-CM

## 2020-03-12 DIAGNOSIS — K74.60 DECOMPENSATION OF CIRRHOSIS OF LIVER (H): ICD-10-CM

## 2020-03-12 RX ORDER — SPIRONOLACTONE 100 MG/1
100 TABLET, FILM COATED ORAL DAILY
Qty: 30 TABLET | Refills: 3 | Status: SHIPPED | OUTPATIENT
Start: 2020-03-12 | End: 2020-06-30

## 2020-03-12 RX ORDER — FUROSEMIDE 40 MG
40 TABLET ORAL DAILY
Qty: 30 TABLET | Refills: 3 | Status: SHIPPED | OUTPATIENT
Start: 2020-03-12 | End: 2020-06-30

## 2020-03-12 NOTE — TELEPHONE ENCOUNTER
[facility] Call Center    Phone Message    May a detailed message be left on voicemail: yes     Reason for Call: Medication Question or concern regarding medication   Prescription Clarification  Name of Medication: spironolactone (ALDACTONE) 100 MG tablet and furosemide (LASIX) 40 MG tablet   Prescribing Provider: Dr. Jeannette Cervantes   Pharmacy: [facility] home delivery   What on the order needs clarification? Pt is wanting medications to be transferred to new pharmacy, [facility] delivery. Providers can call provider call [phone].     Action Taken: Message routed to:  [facility] Hepatology    Travel Screening: Not Applicable

## 2020-03-16 ENCOUNTER — PATIENT OUTREACH (OUTPATIENT)
Dept: GASTROENTEROLOGY | Facility: CLINIC | Age: 55
End: 2020-03-16

## 2020-03-16 ENCOUNTER — VIRTUAL VISIT (OUTPATIENT)
Dept: GASTROENTEROLOGY | Facility: CLINIC | Age: 55
End: 2020-03-16
Payer: COMMERCIAL

## 2020-03-16 DIAGNOSIS — K50.113 CROHN'S DISEASE OF LARGE INTESTINE WITH FISTULA (H): Primary | ICD-10-CM

## 2020-03-16 NOTE — PROGRESS NOTES
Contacted pt in respond to my chart message. Patient in agreement that she is fine with a phone visit with Ms. Serrato.   Pt has changed her MRE to Dante

## 2020-03-16 NOTE — PROGRESS NOTES
"Abiola Matute is a 54 year old female who is being evaluated via a billable telephone visit during the COVID-19 pandemic and clinic response to limit in-person visits.  The patient has been notified of following:   \"This telephone visit will be conducted via a call between you and your physician/provider. We have found that certain health care needs can be provided without the need for a physical exam.  This service lets us provide the care you need with a short phone conversation.  If a prescription is necessary we can send it directly to your pharmacy.  If lab work is needed we can place an order for that and you can then stop by our lab to have the test done at a later time.  If during the course of the call the physician/provider feels a telephone visit is not appropriate, you will not be charged for this service.\"   Consent has been obtained for this service by 1 care team member: No  I have reviewed and updated the patient's Past Medical History, Social History, Family History and Medication List.      Phone call contact time  Call Started at 0940  Call Ended at 1010  On phone            IBD CLINIC VISIT    CC/REFERRING MD:  Referred Self  REASON FOR CONSULTATION: Crohn's.     ASSESSMENT/PLAN:  54 year old female with cirrhosis and Crohn's    1.  Crohn's disease: Mostly perianal fistula.  Symptomatically improved with increasing the dose of infliximab to 10 mg/kg.  Endoscopically assessed prior to this increase in dosage revealed isolated perianal disease.  Biopsies without any active component.  It is encouraging patient has responded quite well to the increase in dose, however patient continues to have breakthrough symptoms as she approaches her infusion.  Additionally, with the presence of perianal disease, she may require higher doses of infliximab.  An infliximab level is pending from her most recent infusion to determine if interval would need to be changed.  --Await infliximab level to determine if " dose needs to be adjusted  --Laboratory studies to be completed every 3 months while on infliximab therapy to include CBC, LFTs, CRP and ESR     2. Cirrhosis: Some notes state cryptogenic, some notes state alcoholic.  I did not push for etiology at this time.  Recent variceal screening with EGD was unremarkable.  Patient is now established with hepatology with Dr. Cervantes.     3. IBD in setting of coronavirus pandemic: Patients on immunosuppressive drugs for IBD and autoimmune hepatitis should continue taking their medications. The risk of disease flare outweighs the chance of kendell coronavirus. These patients should also follow CDC guidelines for at-risk groups by avoiding crowds and limiting travel.     IBD HISTORY  Age at diagnosis: 53 (4/2019)  Extent of disease: miguel angel-anal, anal  Disease phenotype: Miguel Angel-anal fistula  Miguel Angel-anal disease: Yes  Current CD medications:  - Infliximab - started May 2019 (5mg/kg every 8 weeks)  Prior IBD surgeries: No  Prior IBD Medications:    DRUG MONITORING  TPMT enzyme activity:     6-TGN/6-MMPN levels:    Biologic concentration:  10/2/2019: IFX 0.8, anti-IFX antibodies: 451 (Esoterix)  1/22/2020 infliximab level pending (this will be an 8-week trough at 10 mg/kg)    DISEASE ASSESSMENT  Labs  Recent Labs   Lab Test 01/22/20  1055 11/27/19  1045 11/12/19  1646   CRP 6.7  --  12.8*   SED 59* 105* 60*     Fecal calprotectin: --  Endoscopy: Colonoscopy 12/5/2019 shows isolated perianal disease  Enterography: --  C diff: --    sIBDQ:   No flowsheet data found.    IBD Health Care Maintenance:  Vaccinations:  All patients on biologics should avoid live vaccines.    -- Influenza (every year)  -- TdaP (every 10 years)  -- Pneumococcal Pneumonia (once plus booster at 5 years)  -- Yearly assessment for latent Tb (verbal screening and exam, PPD or QuantiFERON-Tb testing)    One time confirmation of immunity or serologies:  -- Hepatitis A (serologies or immunizations)  -- Hepatitis B  (serologies or immunizations)  -- Varicella  -- MMR  -- HPV (all aged 18-26)  -- Meningococcal meningitis (all patients at risk for meningitis)  -- Due to the immunosuppression in this patient, I would not advise administration of live vaccines such as varicella/VZV, intranasal influenza, MMR, or yellow fever vaccine (if travelling).      Bone mineral density screening   -- Recommend all patients supplement with calcium and vitamin D  -- Consider DEXA if not already done    Cancer Screening:  Colon cancer screening:  Unclear colonic extent of disease at this time.      Cervical cancer screening: Per OBGYN    Skin cancer screening: Annual visual exam of skin by dermatologist since patient is immunocompromised    Depression Screening:  PHQ-2 Score:     PHQ-2 ( 1999 Pfizer) 7/23/2019 8/3/2016   Q1: Little interest or pleasure in doing things 0 0   Q2: Feeling down, depressed or hopeless 0 0   PHQ-2 Score 0 0   Q1: Little interest or pleasure in doing things - -   Q2: Feeling down, depressed or hopeless - -   PHQ-2 Score - -     Misc:  -- Avoid tobacco use  -- Avoid NSAIDs as there is potentially a 25% chance of causing an IBD flare    Return to clinic in 3 months    Thank you for this consultation.  It was a pleasure to participate in the care of this patient; please contact us with any further questions.     This note was created with voice recognition software, and while reviewed for accuracy, typos may remain.       Sheldon Serrato PA-C  Division of Gastroenterology, Hepatology and Nutrition  [facility]       HPI:   Patient for follow up of perianal crohn's disease.  Patient additional has hx of alcoholic cirrhosis and follows with hepatology.      Previous hx includes:  She is a history of long-standing diarrhea as well as recurrent aphthous ulcers.  In April 2019 she was diagnosed with Crohn's disease due to perianal fistula.  She started infliximab due to perianal disease, and per colorectal surgery  "note had may be a 30% improvement in fistula output, however the patient stated she did not have improvement. Additionally, patient had an episode of cholecystitis - treated medically due to liver numbers.      Infliximab level had originally been obtained in oct 2019 showing 10/2/2019: IFX 0.8, anti-IFX antibodies: 451 (Esoterix)     Repeat Infliximab level obtained 11/12/19 was 2.3, no antibodies. Dose was optimized to 10mg/kg every 8 weeks.     Since patient's dose has been changed, she feels that symptoms have been significantly improved.  She is currently having 2-3 stools per day that are formed.  She denies any blood in the stool. Occasionally if she feels that she \"ate something one\" this may increase her stool frequency up to 6-8 times, but this is on a rare occasion.  She continues to have urgency with each stool.  As she approaches her infusion (2 to 3 weeks prior to the infusion) she will experience increased tenesmus and fecal incontinence on occasion. Although frequency and urgency have been improved, she continues to have persistent perianal drainage which is quite bothersome.    She denies any extraintestinal manifestations this time.  No mouth sores at this time.    HBI:  Overall patient well being (prior day): 1 (Slightly below par)  Abdominal pain (prior day): 0 (None)  Number of liquid or soft stools (prior day): 0-1 (1 point per stool)  Abdominal mass on exam: 0 (None)  Complications (1 point for each):   Arthralgia, Apthous ulcers and New fistula     ROS:    No fevers or chills  No weight loss  No blurry vision, double vision or change in vision  No sore throat  No lymphadenopathy  No headache, paraesthesias, or weakness in a limb  No shortness of breath or wheezing  No chest pain or pressure  No arthralgias or myalgias  No rashes or skin changes  No odynophagia or dysphagia  No BRBPR, hematochezia, melena  No dysuria, frequency or urgency  No hot/cold intolerance or polyria  No anxiety or " depression    Extra intestinal manifestations of IBD:  No uveitis/episcleritis  + aphthous ulcers (none currently)  + arthritis (none currently)  No erythema nodosum/pyoderma gangrenosum.     PERTINENT PAST MEDICAL HISTORY:  Past Medical History:   Diagnosis Date     Atypical ductal hyperplasia of breast 9/10/10    ERT not recommended -left - and flat epithelial atypia-scheduled for breast biopsy 9/17/2010      BIFID UVULA (aka UVULA)       Contact dermatitis and other eczema, due to unspecified cause     perianal  - recurrent - clobetasol      Hypertension      IBS (irritable bowel syndrome)      Obesity, unspecified      Other isolated or specific phobias     fear of flying - gets Ativan prn.        PREVIOUS SURGERIES:  Past Surgical History:   Procedure Laterality Date     BIOPSY ANAL N/A 3/28/2019    anal biopsy and culure placement of seton - Dr Fleming     BREAST BIOPSY, RT/LT  9/17/2010    left - scheduled with Dr. Kojo WILKERSON APPENDECTOMY  at age 14     COLONOSCOPY  2006     COLONOSCOPY N/A 12/23/2014    Procedure: COMBINED COLONOSCOPY, SINGLE OR MULTIPLE BIOPSY/POLYPECTOMY BY BIOPSY;  Surgeon: Diane Fleming MD;  Location:  GI     COLONOSCOPY N/A 12/5/2019    Procedure: COLONOSCOPY, WITH POLYPECTOMY AND BIOPSY;  Surgeon: Farhan Schilling MD;  Location: [facility]     ESOPHAGOSCOPY, GASTROSCOPY, DUODENOSCOPY (EGD), COMBINED N/A 12/5/2019    Procedure: ESOPHAGOGASTRODUODENOSCOPY (EGD);  Surgeon: Farhan Schilling MD;  Location:  GI     EXAM UNDER ANESTHESIA ANUS N/A 3/28/2019    Procedure: EXAM UNDER ANESTHESIA ANUS;  Surgeon: Diane Fleming MD;  Location:  OR      CLOSED TX BIMALLEOLAR ANKLE FX W/O MANIPULATION  at age 27    left ankle ORIF, plates and screws removed at age 36     HYSTERECTOMY, VAGINAL  2006    with Dr. Licha Zhou - with BSO for fibroids      SURGICAL HISTORY OF -   4/15/2010    Pelviscopy with removal of bilateral hydrosalpinges.        PREVIOUS  ENDOSCOPY:  3/7/19: Flex sig: Colon and rectum appeared normal. 2 large deep ulcers extending from the anal cnaal to left perianal area.     10/23/19: Colonoscopy: Endoscopically normal ileum.  Mild pan colonic congestion with contact friability thought to be related to portal hypertension and thrombocytopenia.  Prior anal verge ulcer is healed    12/23/14: Colonoscopy: Perianal skin tag noted.  Ileum normal, colon normal.    ALLERGIES:     Allergies   Allergen Reactions     Fish Oil      Redness and itching around eye area only - went away when fish oil capsules stopped      Metronidazole      pain/itching     Naphthalenemethylamines      Lamisil = mild urticarial reaction     Penicillins Cramps     Vomiting     Ppd [Tuberculin Purified Protein Derivative]      Sulfa Drugs      hives       PERTINENT MEDICATIONS:    Current Outpatient Medications:      furosemide (LASIX) 40 MG tablet, Take 1 tablet (40 mg) by mouth daily, Disp: 30 tablet, Rfl: 3     Milk Thistle-Dand-Fennel-Licor (MILK THISTLE XTRA) CAPS capsule, Take 1 capsule by mouth daily, Disp: , Rfl:      Multiple Vitamins-Minerals (MULTIVITAMIN & MINERAL PO), Take  by mouth daily., Disp: , Rfl:      spironolactone (ALDACTONE) 100 MG tablet, Take 1 tablet (100 mg) by mouth daily, Disp: 30 tablet, Rfl: 3    SOCIAL HISTORY:  Social History     Socioeconomic History     Marital status:      Spouse name: Albino     Number of children: 3     Years of education: 14     Highest education level: Not on file   Occupational History     Occupation: [employer]     Comment:    Social Needs     Financial resource strain: Not on file     Food insecurity     Worry: Not on file     Inability: Not on file     Transportation needs     Medical: Not on file     Non-medical: Not on file   Tobacco Use     Smoking status: Never Smoker     Smokeless tobacco: Never Used   Substance and Sexual Activity     Alcohol use: Yes     Alcohol/week: 0.0  standard drinks     Comment: occasional     Drug use: No     Comment: no herbal meds either     Sexual activity: Yes     Partners: Male     Birth control/protection: Surgical     Comment: harper had vasectomy   Lifestyle     Physical activity     Days per week: Not on file     Minutes per session: Not on file     Stress: Not on file   Relationships     Social connections     Talks on phone: Not on file     Gets together: Not on file     Attends Jew service: Not on file     Active member of club or organization: Not on file     Attends meetings of clubs or organizations: Not on file     Relationship status: Not on file     Intimate partner violence     Fear of current or ex partner: Not on file     Emotionally abused: Not on file     Physically abused: Not on file     Forced sexual activity: Not on file   Other Topics Concern      Service No     Blood Transfusions No     Caffeine Concern No     Comment: rarely drinks caffeine     Occupational Exposure Not Asked     Hobby Hazards Not Asked     Sleep Concern Not Asked     Stress Concern Not Asked     Weight Concern Not Asked     Special Diet Not Asked     Back Care Not Asked     Exercise Yes     Comment: does a lot of walking - 4x/week     Bike Helmet Not Asked     Seat Belt Yes     Comment: always     Self-Exams Yes     Comment: SBE encouraged monthly     Parent/sibling w/ CABG, MI or angioplasty before 65F 55M? Yes   Social History Narrative    calcium - drinks 5-6 large glasses skim milk/day    flex sig/colonoscopy -at age 49    sun precautions - discussed    mammogram - needs every 2 years in her 30's, then yearly from then on    Td booster - 9/99 and 4/27/2010    pneumovax -at age 59    DEXA -when perimenopausal    stool hemoccults - every year after age 40    ASA- start at age 40    mulvitamin - encouraged       FAMILY HISTORY:  Family History   Problem Relation Age of Onset     Breast Cancer Mother      Gastrointestinal Disease Mother      Heart  Disease Father 57     Heart Disease Paternal Grandfather      Hypertension Maternal Grandmother      Diabetes Paternal Grandmother      Diabetes Maternal Grandfather      Cancer Sister        Past/family/social history reviewed and no changes    PHYSICAL EXAMINATION:  Constitutional: aaox3, cooperative, pleasant, not dyspneic/diaphoretic, no acute distress   LMP 05/31/2006   Wt:   Wt Readings from Last 2 Encounters:   01/22/20 73.2 kg (161 lb 6.4 oz)   11/27/19 70.5 kg (155 lb 8 oz)      Exam deferred as this was a telephone encounter due to the coronavirus outbreak.    PERTINENT STUDIES:  Most recent CBC:  Recent Labs   Lab Test 01/22/20  1055 11/22/19  0830   WBC 2.9* 3.6*   HGB 13.8 13.1   HCT 40.4 37.6   * 109*     Most recent hepatic panel:  Recent Labs   Lab Test 01/22/20  1055 11/22/19  0830   ALT 43 42   AST 73* 69*     Most recent creatinine:  Recent Labs   Lab Test 11/22/19  0830 10/23/19  1019   CR 0.60 0.59            Answers for HPI/ROS submitted by the patient on 11/12/2019   General Symptoms: Yes  Skin Symptoms: No  HENT Symptoms: No  EYE SYMPTOMS: No  HEART SYMPTOMS: No  LUNG SYMPTOMS: No  INTESTINAL SYMPTOMS: Yes  URINARY SYMPTOMS: No  GYNECOLOGIC SYMPTOMS: No  BREAST SYMPTOMS: No  SKELETAL SYMPTOMS: Yes  BLOOD SYMPTOMS: No  NERVOUS SYSTEM SYMPTOMS: No  MENTAL HEALTH SYMPTOMS: No  Fever: No  Loss of appetite: No  Weight loss: Yes  Weight gain: No  Fatigue: Yes  Night sweats: No  Chills: No  Increased stress: Yes  Excessive hunger: No  Excessive thirst: No  Feeling hot or cold when others believe the temperature is normal: Yes  Loss of height: No  Post-operative complications: No  Surgical site pain: No  Hallucinations: No  Change in or Loss of Energy: No  Heart burn or indigestion: No  Nausea: No  Vomiting: No  Abdominal pain: No  Bloating: No  Constipation: Yes  Diarrhea: Yes  Back pain: No  Muscle aches: No  Neck pain: No  Swollen joints: No  Joint pain: Yes  Bone pain: No  Muscle cramps:  No

## 2020-03-16 NOTE — PATIENT INSTRUCTIONS
It was a pleasure taking care of you today.  I've included a brief summary of our discussion and care plan from today's visit below.  Please review this information with your primary care provider.  ______________________________________________________________________    My recommendations are summarized as follows:    -- Continue remicade every 8 weeks for now  -- Labs with your infusions  -- Next endoscopic assessment: pending   -- Patient with IBD we recommend supplementation vitamin D 1000 units daily and calcium 500 mg twice daily.  -- Vaccines/immunizations to be updated: Recommend yearly flu shot, pneumonia vaccines (Prevnar 13 then 8 weeks later Pneumovax 23 then 5 years later Pneumovax 23), tetanus every 10 years.  -- Yearly Dermatology visit for skin check while on immunosuppressive therapy. Can call [phone] to schedule.  -- Yearly pap smear while on immunosuppressive therapy  -- No NSAIDs (ibuprofen, or anything containing ibuprofen)     For additional resources about inflammatory bowel disease visit [email or web address]    Return to GI Clinic in 3 months to review your progress.    ______________________________________________________________________    Who do I call with any questions after my visit?  Please be in touch if there are any further questions that arise following today's visit.  There are multiple ways to contact your gastroenterology care team.        During business hours, you may reach a Gastroenterology nurse at [phone], option [phone].       To schedule or reschedule an appointment, please call [phone].       You can always send a secure message through [employer].  [employer] messages are answered by your nurse or doctor typically within 24 hours.  Please allow extra time on weekends and holidays.        For urgent/emergent questions after business hours, you may reach the on-call GI Fellow by contacting the [facility] at [phone].      In order for your refill to be processed in a timely fashion, it is your responsibility to ensure you follow the recommendations from your provider regarding your laboratory studies and follow up appointments.       How will I get the results of any tests ordered?    You will receive all of your results.  If you have signed up for [employer], any tests ordered at your visit will be available to you after your physician reviews them.  Typically this takes 1-2 weeks.  If there are urgent results that require a change in your care plan, your physician or nurse will call you to discuss the next steps.      What is [employer]?  [employer] is a secure way for you to access all of your healthcare records from the [facility].  It is a web based computer program, so you can sign on to it from any location.  It also allows you to send secure messages to your care team.  I recommend signing up for [employer] access if you have not already done so and are comfortable with using a computer.      How to I schedule a follow-up visit?  If you did not schedule a follow-up visit today, please call [phone] to schedule a follow-up office visit.        Sincerely,    Sheldon Serrato PA-C  [facility]  Division of Gastroenterology

## 2020-03-17 ENCOUNTER — TELEPHONE (OUTPATIENT)
Dept: GASTROENTEROLOGY | Facility: CLINIC | Age: 55
End: 2020-03-17

## 2020-03-17 ENCOUNTER — HOSPITAL ENCOUNTER (OUTPATIENT)
Dept: ULTRASOUND IMAGING | Facility: CLINIC | Age: 55
Discharge: HOME | End: 2020-03-17
Attending: INTERNAL MEDICINE | Admitting: INTERNAL MEDICINE
Payer: COMMERCIAL

## 2020-03-17 ENCOUNTER — TELEPHONE (OUTPATIENT)
Dept: INFUSION THERAPY | Facility: CLINIC | Age: 55
End: 2020-03-17

## 2020-03-17 DIAGNOSIS — K50.113 CROHN'S DISEASE OF LARGE INTESTINE WITH FISTULA (H): ICD-10-CM

## 2020-03-17 DIAGNOSIS — K74.69 OTHER CIRRHOSIS OF LIVER (H): ICD-10-CM

## 2020-03-17 DIAGNOSIS — K72.90 DECOMPENSATION OF CIRRHOSIS OF LIVER (H): ICD-10-CM

## 2020-03-17 DIAGNOSIS — K74.60 DECOMPENSATION OF CIRRHOSIS OF LIVER (H): ICD-10-CM

## 2020-03-17 PROCEDURE — 93975 VASCULAR STUDY: CPT | Mod: TC

## 2020-03-17 NOTE — TELEPHONE ENCOUNTER
Called [facility].  Refill request sent 3/12/20 was too soon, but will request override due to patient leaving country 3/27.  Pharmacy will send out today.  Called to notify patient but no answer.     Talia GARCIA LPN  Hepatology Clinic      [facility]    Phone Message    May a detailed message be left on voicemail: yes     Reason for Call: Medication Refill Request    Has the patient contacted the pharmacy for the refill? Yes   Name of medication being requested: furosemide (LASIX) 40 MG tablet AND spironolactone (ALDACTONE) 100 MG tablet  Provider who prescribed the medication: Dr. Jeannette Cervantes  Pharmacy: [facility] - [address]  Date medication is needed: Patient's states that RX's have been cancelled and requesting an urgent refill as patient is scheduled to leave the country 3/27/2020. Please advise. Thank you, Camille Molina on 3/17/2020 at 10:03 AM       Action Taken: Message routed to:  [facility] ([facility] HEPATOLOGY    Travel Screening: Not Applicable

## 2020-03-17 NOTE — TELEPHONE ENCOUNTER
Patient is currently scheduled for an appointment at [facility] in [place].  Called patient to review current visitor restrictions and complete COVID-19 Patient Infection/Travel Screening Tool.     Due to the recent public health concerns and in an effort to keep our patients and staff safe and healthy, we are implementing a screening process for the patients and visitors that come to our clinic.      At this time, no visitors are allowed on campus.      I am going to ask you a few questions, please answer yes or no.     In the last month, have you been in contact with someone who was confirmed or suspected to have Coronavirus/COVID-19?  No     Do you have a:  Fever (or reported chills)?  No  Cough?  No  Shortness of breath?  No  Rash?  No    Have you recently traveled internationally in the last month?  No  If so, where?  N/A    Patient PASSED the screening assessment.  Patient instructed to come to the clinic as planned for appointment and to call the clinic right away if anything changes in their health status.    Megan Kelley, KATERINAN, RN, PHN

## 2020-03-18 ENCOUNTER — INFUSION THERAPY VISIT (OUTPATIENT)
Dept: INFUSION THERAPY | Facility: CLINIC | Age: 55
End: 2020-03-18
Attending: INTERNAL MEDICINE
Payer: COMMERCIAL

## 2020-03-18 VITALS
SYSTOLIC BLOOD PRESSURE: 114 MMHG | TEMPERATURE: 98.4 F | WEIGHT: 167.6 LBS | HEART RATE: 75 BPM | DIASTOLIC BLOOD PRESSURE: 76 MMHG | BODY MASS INDEX: 27.06 KG/M2

## 2020-03-18 DIAGNOSIS — K50.119 CROHN'S DISEASE OF PERIANAL REGION WITH COMPLICATION (H): Primary | ICD-10-CM

## 2020-03-18 PROCEDURE — 96413 CHEMO IV INFUSION 1 HR: CPT

## 2020-03-18 PROCEDURE — 25000128 H RX IP 250 OP 636: Performed by: INTERNAL MEDICINE

## 2020-03-18 PROCEDURE — 96415 CHEMO IV INFUSION ADDL HR: CPT

## 2020-03-18 PROCEDURE — 25800030 ZZH RX IP 258 OP 636: Performed by: INTERNAL MEDICINE

## 2020-03-18 RX ORDER — DIPHENHYDRAMINE HCL 25 MG
25 CAPSULE ORAL ONCE
Status: CANCELLED
Start: 2020-04-01

## 2020-03-18 RX ORDER — ACETAMINOPHEN 325 MG/1
650 TABLET ORAL ONCE
Status: CANCELLED
Start: 2020-04-01

## 2020-03-18 RX ADMIN — INFLIXIMAB 700 MG: 100 INJECTION, POWDER, LYOPHILIZED, FOR SOLUTION INTRAVENOUS at 11:03

## 2020-03-18 ASSESSMENT — PAIN SCALES - GENERAL: PAINLEVEL: NO PAIN (0)

## 2020-03-18 NOTE — PROGRESS NOTES
Infusion Nursing Note:  Abiola Matute presents today for remicade.    Patient seen by provider today: No   present during visit today: Not Applicable.    Note: pt has had some increase stomach pain, per GI MD it is probably because pt is getting close to the due time for her remicade. Ok to proceed.     Intravenous Access:  Peripheral IV placed.    Treatment Conditions:  Biological Infusion Checklist:  ~~~ NOTE: If the patient answers yes to any of the questions below, hold the infusion and contact ordering provider or on-call provider.    1. Have you recently had an elevated temperature, fever, chills, productive cough, coughing for 3 weeks or longer or hemoptysis, abnormal vital signs, night sweats,  chest pain or have you noticed a decrease in your appetite, unexplained weight loss or fatigue? No  2. Do you have any open wounds or new incisions? No  3. Do you have any recent or upcoming hospitalizations, surgeries or dental procedures? No  4. Do you currently have or recently have had any signs of illness or infection or are you on any antibiotics? No  5. Have you had any new, sudden or worsening abdominal pain? No  6. Have you or anyone in your household received a live vaccination in the past 4 weeks? Please note:  No live vaccines while on biologic/chemotherapy until 6 months after the last treatment.  Patient can receive the flu vaccine (shot only) and the pneumovax.  It is optimal for the patient to get these vaccines mid cycle, but they can be given at any time as long as it is not on the day of the infusion. No  7. Have you recently been diagnosed with any new nervous system diseases (ie. Multiple sclerosis, Guillain [place], seizures, neurological changes) or cancer diagnosis? No  8. Are you on any form of radiation or chemotherapy? No  9. Are you pregnant or breast feeding or do you have plans of pregnancy in the future? No  10. Have you been having any signs of worsening depression or  suicidal ideations?  (benlysta only) No  11. Have there been any other new onset medical symptoms? No        Post Infusion Assessment:  Patient tolerated infusion without incident.  Blood return noted pre and post infusion.  Site patent and intact, free from redness, edema or discomfort.  No evidence of extravasations.  Access discontinued per protocol.       Discharge Plan:   Discharge instructions reviewed with: Patient.  Patient and/or family verbalized understanding of discharge instructions and all questions answered.  Patient discharged in stable condition accompanied by: self.  Departure Mode: Ambulatory.    Gregoria Adame RN

## 2020-03-24 NOTE — LETTER
March 24, 2020       TO: Abiola Matute  [address] [address]         [facility]  [address]  [address] [facility] [address] [phone]     March 23, 2020     Regarding:  Abiola Matute  [address] [address]     To Whom It May Concern;     Ms. Matute is a 54-year-old female with Crohn's disease on a medication called Remicade.  Patients with Crohn's disease have an altered immune system.  Additionally medications to treat Crohn's disease, such as Remicade which she is on, suppress the immune system.  She is at a high risk for infection with the novel coronavirus.       It is my professional recommendation that she maintain strict social isolation and not undergo any travel for the next 60 days.  Even if she is not traveling to an area with substantial coronavirus burden, passing through airports would likely expose her to the coronavirus given its current ubiquitous spread.           Sincerely,     Farhan Schilling MD    [facility]  Inflammatory Bowel Disease Program  Division of Gastroenterology, Hepatology and Nutrition

## 2020-04-06 RX ORDER — URSODIOL 300 MG/1
300 CAPSULE ORAL 2 TIMES DAILY
Qty: 60 CAPSULE | Refills: 3 | OUTPATIENT
Start: 2020-04-06 | End: 2024-08-08

## 2020-04-07 ENCOUNTER — ALLIED HEALTH/NURSE VISIT (OUTPATIENT)
Dept: PHARMACY | Facility: CLINIC | Age: 55
End: 2020-04-07
Payer: COMMERCIAL

## 2020-04-07 DIAGNOSIS — K80.20 GALLSTONES: Primary | ICD-10-CM

## 2020-04-07 PROCEDURE — 99207 ZZC NO CHARGE LOS: CPT | Performed by: PHARMACIST

## 2020-04-07 RX ORDER — URSODIOL 250 MG/1
375 TABLET, FILM COATED ORAL 2 TIMES DAILY
Qty: 90 TABLET | Refills: 0 | Status: ON HOLD | OUTPATIENT
Start: 2020-04-07 | End: 2020-04-24

## 2020-04-07 NOTE — PROGRESS NOTES
"Clinical Pharmacy Consult:                                                    Abiola Matute is a 54 year old female called for a clinical pharmacist consult.  She was referred to me from Dr. Schilling.     [place] is not seen for CMR today due to time and request for consult.    Reason for Consult: trial of ursodiol per Dr. Shakir Schilling, MD Woody Muhammad Ann RN; Sonia Pacheco, [facility]            I sent below message to [place]:     If she wants to try ursodiol - you can write for total of 10mg/kg/day in 2 divided doses.      Discussion:     Virginia has described recent \"attacks\" which she believes are associated with her gallbladder. She has had several back and forth messages with Dr. Schilling regarding this concern. Additionally, she is aware of red flag symptoms that would warrant attention (such as fevers, uncontrolled nausea/vomiting, jaundice, etc.). She is avoiding fatty foods and staying hydrated. She feels the biggest problem time for her is late in the evening between 10 pm and midnight. She again expresses her desire to stay out of the hospital or prolong surgery given the current coronavirus pandemic.     She believes her most recent weight (76 kg) is accurate within a couple of pounds. Reviewed tablets versus capsules. Studies of ursodiol have suggested a dose of 8-10 mg/kg/day divided is best for potential dissolution of stones. Per direction above, this would be roughly 760 mg/day or 380 mg BID. We can get closest to this dose with 250 mg tablets (375 mg BID), though capsules are usually indicated for this situation. She may be under-dosed if we chose a strategy of 300 mg BID to align with the capsules, but can try this if the tablets are not covered. We reviewed that the tablets are bitter and she should take these quickly with water. Reviewed basics of administration and common side effects. She is able to eat right now and has no concerns with needing to take this medication with " food.    No major drug-drug interactions with current medications in Epic and ursodiol (per [employer]).    She has no further questions and will contact me if questions arise.    Plan:  1. Sonia to send order for ursodiol to [facility] pharmacy per message above   -Dr. Shakir mary with tablets to get closer weight based dosing  2. [place] to reach out with further questions if needed, contact information provided

## 2020-04-16 ENCOUNTER — PATIENT OUTREACH (OUTPATIENT)
Dept: GASTROENTEROLOGY | Facility: CLINIC | Age: 55
End: 2020-04-16

## 2020-04-16 DIAGNOSIS — K83.1 CHOLESTASIS (H): Primary | ICD-10-CM

## 2020-04-16 NOTE — PROGRESS NOTES
Pt nando left a voice mail message earlier that she was struggling with pain. That ursodiol is not working  Entered a surgery referral.    Left a message with my name and number. l

## 2020-04-17 ENCOUNTER — PATIENT OUTREACH (OUTPATIENT)
Dept: SURGERY | Facility: CLINIC | Age: 55
End: 2020-04-17

## 2020-04-17 NOTE — PROGRESS NOTES
Referral received for patient to consult regarding cholecystectomy.  Attempted to reach patient with no answer. LM on VM to call office.  Direct contact information provided.

## 2020-04-17 NOTE — PROGRESS NOTES
Patient returned call and is scheduled for a video visit on 4/21 with Dr. Estrada.  Patient will download ethan and use her tablet.    teextee message/link sent to the patient.

## 2020-04-20 ENCOUNTER — PATIENT OUTREACH (OUTPATIENT)
Dept: SURGERY | Facility: CLINIC | Age: 55
End: 2020-04-20

## 2020-04-20 NOTE — PROGRESS NOTES
Pre Visit Call and Assessment    Date of call:  04/20/2020    Phone numbers:  Home number on file [phone] (home)    Reached patient/confirmed appointment:  No - left message:   on voicemail  Patient care team/Primary provider:  No Ref-Primary, Physician  Preferred outpatient pharmacy:    [facility] - [place], [place] - [address]  [address] [address]  Phone: [phone] Fax: [phone] Alternate Fax: [phone], [phone]    [facility] - [address]  [address]  [address]  [facility] [address]  Phone: [phone] Fax: [phone]    Referred to:  Dr. Wilner Estrada    Reason for visit:  Gallbladder consult

## 2020-04-21 ENCOUNTER — APPOINTMENT (OUTPATIENT)
Dept: ULTRASOUND IMAGING | Facility: CLINIC | Age: 55
DRG: 446 | End: 2020-04-21
Attending: EMERGENCY MEDICINE
Payer: COMMERCIAL

## 2020-04-21 ENCOUNTER — VIRTUAL VISIT (OUTPATIENT)
Dept: SURGERY | Facility: CLINIC | Age: 55
End: 2020-04-21
Payer: COMMERCIAL

## 2020-04-21 ENCOUNTER — TELEPHONE (OUTPATIENT)
Dept: SURGERY | Facility: CLINIC | Age: 55
End: 2020-04-21

## 2020-04-21 ENCOUNTER — HOSPITAL ENCOUNTER (OUTPATIENT)
Facility: CLINIC | Age: 55
Setting detail: OBSERVATION
Discharge: HOME | DRG: 445 | End: 2020-04-25
Attending: SURGERY | Admitting: SURGERY
Payer: COMMERCIAL

## 2020-04-21 ENCOUNTER — PATIENT OUTREACH (OUTPATIENT)
Dept: SURGERY | Facility: CLINIC | Age: 55
End: 2020-04-21

## 2020-04-21 ENCOUNTER — HOSPITAL ENCOUNTER (OUTPATIENT)
Facility: CLINIC | Age: 55
Setting detail: OBSERVATION
Discharge: ANOTHER HOSPITAL | DRG: 446 | End: 2020-04-21
Attending: EMERGENCY MEDICINE | Admitting: INTERNAL MEDICINE
Payer: COMMERCIAL

## 2020-04-21 VITALS
DIASTOLIC BLOOD PRESSURE: 68 MMHG | RESPIRATION RATE: 18 BRPM | TEMPERATURE: 98.6 F | SYSTOLIC BLOOD PRESSURE: 125 MMHG | OXYGEN SATURATION: 97 % | HEART RATE: 91 BPM

## 2020-04-21 DIAGNOSIS — D69.6 THROMBOCYTOPENIA (H): ICD-10-CM

## 2020-04-21 DIAGNOSIS — K81.0 ACUTE CHOLECYSTITIS: ICD-10-CM

## 2020-04-21 DIAGNOSIS — K70.9 ALCOHOLIC LIVER DISEASE (H): ICD-10-CM

## 2020-04-21 DIAGNOSIS — K81.9 CHOLECYSTITIS: Primary | ICD-10-CM

## 2020-04-21 DIAGNOSIS — K80.20 GALLSTONES: Primary | ICD-10-CM

## 2020-04-21 LAB
ALBUMIN SERPL-MCNC: 3 G/DL (ref 3.4–5)
ALBUMIN SERPL-MCNC: 3.4 G/DL (ref 3.4–5)
ALP SERPL-CCNC: 128 U/L (ref 40–150)
ALP SERPL-CCNC: 169 U/L (ref 40–150)
ALT SERPL W P-5'-P-CCNC: 34 U/L (ref 0–50)
ALT SERPL W P-5'-P-CCNC: 38 U/L (ref 0–50)
ANION GAP SERPL CALCULATED.3IONS-SCNC: 6 MMOL/L (ref 3–14)
ANION GAP SERPL CALCULATED.3IONS-SCNC: 9 MMOL/L (ref 3–14)
APTT PPP: 41 SEC (ref 22–37)
AST SERPL W P-5'-P-CCNC: 50 U/L (ref 0–45)
AST SERPL W P-5'-P-CCNC: 57 U/L (ref 0–45)
BASOPHILS # BLD AUTO: 0 10E9/L (ref 0–0.2)
BASOPHILS NFR BLD AUTO: 0.7 %
BILIRUB SERPL-MCNC: 1.8 MG/DL (ref 0.2–1.3)
BILIRUB SERPL-MCNC: 2 MG/DL (ref 0.2–1.3)
BUN SERPL-MCNC: 10 MG/DL (ref 7–30)
BUN SERPL-MCNC: 13 MG/DL (ref 7–30)
CALCIUM SERPL-MCNC: 8.9 MG/DL (ref 8.5–10.1)
CALCIUM SERPL-MCNC: 9.4 MG/DL (ref 8.5–10.1)
CHLORIDE SERPL-SCNC: 105 MMOL/L (ref 94–109)
CHLORIDE SERPL-SCNC: 109 MMOL/L (ref 94–109)
CO2 SERPL-SCNC: 23 MMOL/L (ref 20–32)
CO2 SERPL-SCNC: 26 MMOL/L (ref 20–32)
CREAT SERPL-MCNC: 0.66 MG/DL (ref 0.52–1.04)
CREAT SERPL-MCNC: 0.68 MG/DL (ref 0.52–1.04)
DIFFERENTIAL METHOD BLD: ABNORMAL
EOSINOPHIL # BLD AUTO: 0.2 10E9/L (ref 0–0.7)
EOSINOPHIL NFR BLD AUTO: 3.7 %
ERYTHROCYTE [DISTWIDTH] IN BLOOD BY AUTOMATED COUNT: 14.6 % (ref 10–15)
ERYTHROCYTE [DISTWIDTH] IN BLOOD BY AUTOMATED COUNT: 15.2 % (ref 10–15)
GFR SERPL CREATININE-BSD FRML MDRD: >90 ML/MIN/{1.73_M2}
GFR SERPL CREATININE-BSD FRML MDRD: >90 ML/MIN/{1.73_M2}
GLUCOSE SERPL-MCNC: 125 MG/DL (ref 70–99)
GLUCOSE SERPL-MCNC: 96 MG/DL (ref 70–99)
HCT VFR BLD AUTO: 38.1 % (ref 35–47)
HCT VFR BLD AUTO: 41.5 % (ref 35–47)
HGB BLD-MCNC: 12.5 G/DL (ref 11.7–15.7)
HGB BLD-MCNC: 14.1 G/DL (ref 11.7–15.7)
IMM GRANULOCYTES # BLD: 0 10E9/L (ref 0–0.4)
IMM GRANULOCYTES NFR BLD: 0.2 %
INR PPP: 1.32 (ref 0.86–1.14)
LIPASE SERPL-CCNC: 174 U/L (ref 73–393)
LIPASE SERPL-CCNC: 284 U/L (ref 73–393)
LYMPHOCYTES # BLD AUTO: 1 10E9/L (ref 0.8–5.3)
LYMPHOCYTES NFR BLD AUTO: 22.1 %
MCH RBC QN AUTO: 34.1 PG (ref 26.5–33)
MCH RBC QN AUTO: 34.5 PG (ref 26.5–33)
MCHC RBC AUTO-ENTMCNC: 32.8 G/DL (ref 31.5–36.5)
MCHC RBC AUTO-ENTMCNC: 34 G/DL (ref 31.5–36.5)
MCV RBC AUTO: 100 FL (ref 78–100)
MCV RBC AUTO: 105 FL (ref 78–100)
MONOCYTES # BLD AUTO: 0.4 10E9/L (ref 0–1.3)
MONOCYTES NFR BLD AUTO: 7.9 %
NEUTROPHILS # BLD AUTO: 3 10E9/L (ref 1.6–8.3)
NEUTROPHILS NFR BLD AUTO: 65.4 %
NRBC # BLD AUTO: 0 10*3/UL
NRBC BLD AUTO-RTO: 0 /100
PLATELET # BLD AUTO: 88 10E9/L (ref 150–450)
PLATELET # BLD AUTO: 98 10E9/L (ref 150–450)
POTASSIUM SERPL-SCNC: 3.3 MMOL/L (ref 3.4–5.3)
POTASSIUM SERPL-SCNC: 4.7 MMOL/L (ref 3.4–5.3)
PROT SERPL-MCNC: 7.7 G/DL (ref 6.8–8.8)
PROT SERPL-MCNC: 8.8 G/DL (ref 6.8–8.8)
RBC # BLD AUTO: 3.62 10E12/L (ref 3.8–5.2)
RBC # BLD AUTO: 4.14 10E12/L (ref 3.8–5.2)
SODIUM SERPL-SCNC: 137 MMOL/L (ref 133–144)
SODIUM SERPL-SCNC: 141 MMOL/L (ref 133–144)
WBC # BLD AUTO: 4.5 10E9/L (ref 4–11)
WBC # BLD AUTO: 4.6 10E9/L (ref 4–11)

## 2020-04-21 PROCEDURE — 99223 1ST HOSP IP/OBS HIGH 75: CPT | Mod: AI | Performed by: INTERNAL MEDICINE

## 2020-04-21 PROCEDURE — 25000128 H RX IP 250 OP 636: Performed by: EMERGENCY MEDICINE

## 2020-04-21 PROCEDURE — 25000132 ZZH RX MED GY IP 250 OP 250 PS 637: Performed by: STUDENT IN AN ORGANIZED HEALTH CARE EDUCATION/TRAINING PROGRAM

## 2020-04-21 PROCEDURE — G0378 HOSPITAL OBSERVATION PER HR: HCPCS

## 2020-04-21 PROCEDURE — 85027 COMPLETE CBC AUTOMATED: CPT | Performed by: STUDENT IN AN ORGANIZED HEALTH CARE EDUCATION/TRAINING PROGRAM

## 2020-04-21 PROCEDURE — 80053 COMPREHEN METABOLIC PANEL: CPT | Performed by: STUDENT IN AN ORGANIZED HEALTH CARE EDUCATION/TRAINING PROGRAM

## 2020-04-21 PROCEDURE — 83690 ASSAY OF LIPASE: CPT | Performed by: EMERGENCY MEDICINE

## 2020-04-21 PROCEDURE — 85730 THROMBOPLASTIN TIME PARTIAL: CPT | Performed by: STUDENT IN AN ORGANIZED HEALTH CARE EDUCATION/TRAINING PROGRAM

## 2020-04-21 PROCEDURE — 96366 THER/PROPH/DIAG IV INF ADDON: CPT

## 2020-04-21 PROCEDURE — 76705 ECHO EXAM OF ABDOMEN: CPT

## 2020-04-21 PROCEDURE — 83690 ASSAY OF LIPASE: CPT | Performed by: STUDENT IN AN ORGANIZED HEALTH CARE EDUCATION/TRAINING PROGRAM

## 2020-04-21 PROCEDURE — 96376 TX/PRO/DX INJ SAME DRUG ADON: CPT

## 2020-04-21 PROCEDURE — 25000128 H RX IP 250 OP 636: Performed by: STUDENT IN AN ORGANIZED HEALTH CARE EDUCATION/TRAINING PROGRAM

## 2020-04-21 PROCEDURE — 96365 THER/PROPH/DIAG IV INF INIT: CPT

## 2020-04-21 PROCEDURE — 25000128 H RX IP 250 OP 636: Performed by: INTERNAL MEDICINE

## 2020-04-21 PROCEDURE — 99222 1ST HOSP IP/OBS MODERATE 55: CPT | Performed by: SURGERY

## 2020-04-21 PROCEDURE — 12000001 ZZH R&B MED SURG/OB UMMC

## 2020-04-21 PROCEDURE — 96375 TX/PRO/DX INJ NEW DRUG ADDON: CPT

## 2020-04-21 PROCEDURE — 96374 THER/PROPH/DIAG INJ IV PUSH: CPT

## 2020-04-21 PROCEDURE — 12000000 ZZH R&B MED SURG/OB

## 2020-04-21 PROCEDURE — 85610 PROTHROMBIN TIME: CPT | Performed by: STUDENT IN AN ORGANIZED HEALTH CARE EDUCATION/TRAINING PROGRAM

## 2020-04-21 PROCEDURE — 25800030 ZZH RX IP 258 OP 636: Performed by: STUDENT IN AN ORGANIZED HEALTH CARE EDUCATION/TRAINING PROGRAM

## 2020-04-21 PROCEDURE — 36415 COLL VENOUS BLD VENIPUNCTURE: CPT | Performed by: STUDENT IN AN ORGANIZED HEALTH CARE EDUCATION/TRAINING PROGRAM

## 2020-04-21 PROCEDURE — 99207 ZZC APP CREDIT; MD BILLING SHARED VISIT: CPT | Performed by: INTERNAL MEDICINE

## 2020-04-21 PROCEDURE — 25800030 ZZH RX IP 258 OP 636: Performed by: EMERGENCY MEDICINE

## 2020-04-21 PROCEDURE — 96361 HYDRATE IV INFUSION ADD-ON: CPT

## 2020-04-21 PROCEDURE — 85025 COMPLETE CBC W/AUTO DIFF WBC: CPT | Performed by: EMERGENCY MEDICINE

## 2020-04-21 PROCEDURE — 80053 COMPREHEN METABOLIC PANEL: CPT | Performed by: EMERGENCY MEDICINE

## 2020-04-21 PROCEDURE — 99285 EMERGENCY DEPT VISIT HI MDM: CPT | Mod: 25

## 2020-04-21 PROCEDURE — 25800030 ZZH RX IP 258 OP 636: Performed by: INTERNAL MEDICINE

## 2020-04-21 RX ORDER — ACETAMINOPHEN 325 MG/1
650 TABLET ORAL EVERY 4 HOURS PRN
Status: DISCONTINUED | OUTPATIENT
Start: 2020-04-21 | End: 2020-04-21 | Stop reason: HOSPADM

## 2020-04-21 RX ORDER — MORPHINE SULFATE 4 MG/ML
4 INJECTION, SOLUTION INTRAMUSCULAR; INTRAVENOUS
Status: DISCONTINUED | OUTPATIENT
Start: 2020-04-21 | End: 2020-04-21 | Stop reason: CLARIF

## 2020-04-21 RX ORDER — FUROSEMIDE 40 MG
40 TABLET ORAL DAILY
Status: DISCONTINUED | OUTPATIENT
Start: 2020-04-21 | End: 2020-04-25 | Stop reason: HOSPADM

## 2020-04-21 RX ORDER — NALOXONE HYDROCHLORIDE 0.4 MG/ML
.1-.4 INJECTION, SOLUTION INTRAMUSCULAR; INTRAVENOUS; SUBCUTANEOUS
Status: DISCONTINUED | OUTPATIENT
Start: 2020-04-21 | End: 2020-04-25 | Stop reason: HOSPADM

## 2020-04-21 RX ORDER — POTASSIUM CHLORIDE 1.5 G/1.58G
20-40 POWDER, FOR SOLUTION ORAL
Status: DISCONTINUED | OUTPATIENT
Start: 2020-04-21 | End: 2020-04-21 | Stop reason: HOSPADM

## 2020-04-21 RX ORDER — SPIRONOLACTONE 50 MG/1
100 TABLET, FILM COATED ORAL DAILY
Status: DISCONTINUED | OUTPATIENT
Start: 2020-04-21 | End: 2020-04-25 | Stop reason: HOSPADM

## 2020-04-21 RX ORDER — POLYETHYLENE GLYCOL 3350 17 G/17G
17 POWDER, FOR SOLUTION ORAL DAILY PRN
Status: DISCONTINUED | OUTPATIENT
Start: 2020-04-21 | End: 2020-04-21 | Stop reason: HOSPADM

## 2020-04-21 RX ORDER — POTASSIUM CHLORIDE 29.8 MG/ML
20 INJECTION INTRAVENOUS
Status: DISCONTINUED | OUTPATIENT
Start: 2020-04-21 | End: 2020-04-21 | Stop reason: HOSPADM

## 2020-04-21 RX ORDER — ONDANSETRON 2 MG/ML
4 INJECTION INTRAMUSCULAR; INTRAVENOUS
Status: COMPLETED | OUTPATIENT
Start: 2020-04-21 | End: 2020-04-21

## 2020-04-21 RX ORDER — POTASSIUM CL/LIDO/0.9 % NACL 10MEQ/0.1L
10 INTRAVENOUS SOLUTION, PIGGYBACK (ML) INTRAVENOUS
Status: DISCONTINUED | OUTPATIENT
Start: 2020-04-21 | End: 2020-04-21 | Stop reason: HOSPADM

## 2020-04-21 RX ORDER — ONDANSETRON 4 MG/1
4 TABLET, ORALLY DISINTEGRATING ORAL EVERY 6 HOURS PRN
Status: DISCONTINUED | OUTPATIENT
Start: 2020-04-21 | End: 2020-04-21 | Stop reason: HOSPADM

## 2020-04-21 RX ORDER — SPIRONOLACTONE 25 MG/1
100 TABLET ORAL DAILY
Status: DISCONTINUED | OUTPATIENT
Start: 2020-04-21 | End: 2020-04-21 | Stop reason: HOSPADM

## 2020-04-21 RX ORDER — POTASSIUM CHLORIDE 1500 MG/1
20-40 TABLET, EXTENDED RELEASE ORAL
Status: DISCONTINUED | OUTPATIENT
Start: 2020-04-21 | End: 2020-04-21 | Stop reason: HOSPADM

## 2020-04-21 RX ORDER — DEXTROSE MONOHYDRATE, SODIUM CHLORIDE, AND POTASSIUM CHLORIDE 50; 1.49; 4.5 G/1000ML; G/1000ML; G/1000ML
INJECTION, SOLUTION INTRAVENOUS CONTINUOUS
Status: DISCONTINUED | OUTPATIENT
Start: 2020-04-21 | End: 2020-04-25 | Stop reason: HOSPADM

## 2020-04-21 RX ORDER — HYDROMORPHONE HYDROCHLORIDE 1 MG/ML
0.2 INJECTION, SOLUTION INTRAMUSCULAR; INTRAVENOUS; SUBCUTANEOUS
Status: DISCONTINUED | OUTPATIENT
Start: 2020-04-21 | End: 2020-04-21 | Stop reason: HOSPADM

## 2020-04-21 RX ORDER — NALOXONE HYDROCHLORIDE 0.4 MG/ML
.1-.4 INJECTION, SOLUTION INTRAMUSCULAR; INTRAVENOUS; SUBCUTANEOUS
Status: DISCONTINUED | OUTPATIENT
Start: 2020-04-21 | End: 2020-04-21 | Stop reason: HOSPADM

## 2020-04-21 RX ORDER — AMOXICILLIN 250 MG
1 CAPSULE ORAL 2 TIMES DAILY PRN
Status: DISCONTINUED | OUTPATIENT
Start: 2020-04-21 | End: 2020-04-21 | Stop reason: HOSPADM

## 2020-04-21 RX ORDER — AMOXICILLIN 250 MG
2 CAPSULE ORAL 2 TIMES DAILY PRN
Status: DISCONTINUED | OUTPATIENT
Start: 2020-04-21 | End: 2020-04-21 | Stop reason: HOSPADM

## 2020-04-21 RX ORDER — OXYCODONE HYDROCHLORIDE 5 MG/1
5-10 TABLET ORAL EVERY 4 HOURS PRN
Status: DISCONTINUED | OUTPATIENT
Start: 2020-04-21 | End: 2020-04-25 | Stop reason: HOSPADM

## 2020-04-21 RX ORDER — ONDANSETRON 2 MG/ML
4 INJECTION INTRAMUSCULAR; INTRAVENOUS EVERY 6 HOURS PRN
Status: DISCONTINUED | OUTPATIENT
Start: 2020-04-21 | End: 2020-04-25 | Stop reason: HOSPADM

## 2020-04-21 RX ORDER — OXYCODONE HYDROCHLORIDE 5 MG/1
5 TABLET ORAL EVERY 4 HOURS PRN
Status: DISCONTINUED | OUTPATIENT
Start: 2020-04-21 | End: 2020-04-21 | Stop reason: HOSPADM

## 2020-04-21 RX ORDER — AMOXICILLIN 250 MG
1 CAPSULE ORAL 2 TIMES DAILY
Status: DISCONTINUED | OUTPATIENT
Start: 2020-04-21 | End: 2020-04-25 | Stop reason: HOSPADM

## 2020-04-21 RX ORDER — AMOXICILLIN 250 MG
2 CAPSULE ORAL 2 TIMES DAILY
Status: DISCONTINUED | OUTPATIENT
Start: 2020-04-21 | End: 2020-04-25 | Stop reason: HOSPADM

## 2020-04-21 RX ORDER — ONDANSETRON 4 MG/1
4 TABLET, ORALLY DISINTEGRATING ORAL EVERY 6 HOURS PRN
Status: DISCONTINUED | OUTPATIENT
Start: 2020-04-21 | End: 2020-04-25 | Stop reason: HOSPADM

## 2020-04-21 RX ORDER — POLYETHYLENE GLYCOL 3350 17 G/17G
17 POWDER, FOR SOLUTION ORAL DAILY PRN
Status: DISCONTINUED | OUTPATIENT
Start: 2020-04-21 | End: 2020-04-25 | Stop reason: HOSPADM

## 2020-04-21 RX ORDER — DEXTROSE MONOHYDRATE, SODIUM CHLORIDE, AND POTASSIUM CHLORIDE 50; 1.49; 4.5 G/1000ML; G/1000ML; G/1000ML
INJECTION, SOLUTION INTRAVENOUS CONTINUOUS
Status: DISCONTINUED | OUTPATIENT
Start: 2020-04-21 | End: 2020-04-21

## 2020-04-21 RX ORDER — POTASSIUM CHLORIDE 7.45 MG/ML
10 INJECTION INTRAVENOUS
Status: DISCONTINUED | OUTPATIENT
Start: 2020-04-21 | End: 2020-04-21 | Stop reason: HOSPADM

## 2020-04-21 RX ORDER — PIPERACILLIN SODIUM, TAZOBACTAM SODIUM 3; .375 G/15ML; G/15ML
3.38 INJECTION, POWDER, LYOPHILIZED, FOR SOLUTION INTRAVENOUS EVERY 6 HOURS
Status: DISCONTINUED | OUTPATIENT
Start: 2020-04-21 | End: 2020-04-24

## 2020-04-21 RX ORDER — LIDOCAINE 40 MG/G
CREAM TOPICAL
Status: DISCONTINUED | OUTPATIENT
Start: 2020-04-21 | End: 2020-04-25 | Stop reason: HOSPADM

## 2020-04-21 RX ORDER — HYDROMORPHONE HYDROCHLORIDE 1 MG/ML
0.5 INJECTION, SOLUTION INTRAMUSCULAR; INTRAVENOUS; SUBCUTANEOUS
Status: DISCONTINUED | OUTPATIENT
Start: 2020-04-21 | End: 2020-04-21 | Stop reason: CLARIF

## 2020-04-21 RX ORDER — ONDANSETRON 2 MG/ML
4 INJECTION INTRAMUSCULAR; INTRAVENOUS EVERY 6 HOURS PRN
Status: DISCONTINUED | OUTPATIENT
Start: 2020-04-21 | End: 2020-04-21 | Stop reason: HOSPADM

## 2020-04-21 RX ADMIN — HYDROMORPHONE HYDROCHLORIDE 0.5 MG: 1 INJECTION, SOLUTION INTRAMUSCULAR; INTRAVENOUS; SUBCUTANEOUS at 10:51

## 2020-04-21 RX ADMIN — PIPERACILLIN AND TAZOBACTAM 3.38 G: 3; .375 INJECTION, POWDER, FOR SOLUTION INTRAVENOUS at 19:21

## 2020-04-21 RX ADMIN — SPIRONOLACTONE 100 MG: 50 TABLET, FILM COATED ORAL at 17:46

## 2020-04-21 RX ADMIN — ONDANSETRON 4 MG: 4 TABLET, ORALLY DISINTEGRATING ORAL at 19:03

## 2020-04-21 RX ADMIN — OXYCODONE HYDROCHLORIDE 10 MG: 5 TABLET ORAL at 17:30

## 2020-04-21 RX ADMIN — ONDANSETRON 4 MG: 2 INJECTION INTRAMUSCULAR; INTRAVENOUS at 03:00

## 2020-04-21 RX ADMIN — POTASSIUM CHLORIDE, DEXTROSE MONOHYDRATE AND SODIUM CHLORIDE: 150; 5; 450 INJECTION, SOLUTION INTRAVENOUS at 07:45

## 2020-04-21 RX ADMIN — MORPHINE SULFATE 4 MG: 4 INJECTION INTRAVENOUS at 03:00

## 2020-04-21 RX ADMIN — TAZOBACTAM SODIUM AND PIPERACILLIN SODIUM 3.38 G: 375; 3 INJECTION, SOLUTION INTRAVENOUS at 09:58

## 2020-04-21 RX ADMIN — POTASSIUM CHLORIDE, DEXTROSE MONOHYDRATE AND SODIUM CHLORIDE: 150; 5; 450 INJECTION, SOLUTION INTRAVENOUS at 19:21

## 2020-04-21 RX ADMIN — FUROSEMIDE 40 MG: 40 TABLET ORAL at 17:46

## 2020-04-21 RX ADMIN — SODIUM CHLORIDE 1000 ML: 9 INJECTION, SOLUTION INTRAVENOUS at 02:35

## 2020-04-21 RX ADMIN — MORPHINE SULFATE 4 MG: 4 INJECTION INTRAVENOUS at 03:40

## 2020-04-21 RX ADMIN — HYDROMORPHONE HYDROCHLORIDE 0.5 MG: 1 INJECTION, SOLUTION INTRAMUSCULAR; INTRAVENOUS; SUBCUTANEOUS at 06:14

## 2020-04-21 RX ADMIN — OXYCODONE HYDROCHLORIDE 10 MG: 5 TABLET ORAL at 22:05

## 2020-04-21 RX ADMIN — TAZOBACTAM SODIUM AND PIPERACILLIN SODIUM 4.5 G: 500; 4 INJECTION, SOLUTION INTRAVENOUS at 03:55

## 2020-04-21 ASSESSMENT — ENCOUNTER SYMPTOMS
FEVER: 0
ABDOMINAL PAIN: 1
ROS GI COMMENTS: BLOATING AND GAS
VOMITING: 1

## 2020-04-21 ASSESSMENT — ACTIVITIES OF DAILY LIVING (ADL)
TRANSFERRING: 0-->INDEPENDENT
RETIRED_EATING: 0-->INDEPENDENT
BATHING: 0-->INDEPENDENT
ADLS_ACUITY_SCORE: 11
AMBULATION: 0-->INDEPENDENT
ADLS_ACUITY_SCORE: 11
ADLS_ACUITY_SCORE: 11
RETIRED_COMMUNICATION: 0-->UNDERSTANDS/COMMUNICATES WITHOUT DIFFICULTY
COGNITION: 0 - NO COGNITION ISSUES REPORTED
DRESS: 0-->INDEPENDENT
TOILETING: 0-->INDEPENDENT
ADLS_ACUITY_SCORE: 11
FALL_HISTORY_WITHIN_LAST_SIX_MONTHS: NO
SWALLOWING: 0-->SWALLOWS FOODS/LIQUIDS WITHOUT DIFFICULTY

## 2020-04-21 ASSESSMENT — MIFFLIN-ST. JEOR: SCORE: 1382.86

## 2020-04-21 NOTE — CONSULTS
SURGICAL CONSULTATION   REQUESTING PROVIDER:  Dr Diaz   HISTORY OF PRESENT ILLNESS:  I was asked by Dr. Diaz to see Abiola Matute who is a 54 year old female with a protracted episode of abdominal pain which is bandlike pain across the upper abdomen and right side.    The pain does radiate to her back.  It does wake her from sleep.  It is associated with  nausea and vomiting. She does have a history of fatty food intolerance and had pizza last evening which brought on her attack.  She reports 10 episodes of pain over the last two weeks.  Last evening was the worst and the nausea and vomiting were new.  She feels much better this morning.    Prior abdominal surgery includes appendectomy, hysterectomy.  She has a diagnosis of end stage liver failure and was referred to the [facility] for surgery last fall.  At that time she had ascites drained.  She was managed conservatively and is scheduled for a video Consult wit Dr Wilner Estrada today at 10:30 for her biliary colic.   PAST MEDICAL HISTORY:  has a past medical history of Alcohol abuse, Alcoholic cirrhosis of liver with ascites, Atypical ductal hyperplasia of breast (9/10/10), Bifid uvula, Cholelithiasis, Contact perianal dermatitis and other eczema, Crohn disease, Fear of flying, Hypertension, and IBS (irritable bowel syndrome).  Smoking History:  has never smoked.    ETOH History:  Quit drinking August 2019      PAST SURGICAL HISTORY:    Past Surgical History:   Procedure Laterality Date     BIOPSY ANAL N/A 3/28/2019    anal biopsy and culure placement of seton - Dr Fleming     BIOPSY BREAST Left 09/17/2010    - scheduled with Dr. Varma      BIOPSY LIVER  2019     C APPENDECTOMY  at age 14     COLONOSCOPY  2006     COLONOSCOPY N/A 12/23/2014    Procedure: COMBINED COLONOSCOPY, SINGLE OR MULTIPLE BIOPSY/POLYPECTOMY BY BIOPSY;  Surgeon: Diane Fleming MD;  Location:  GI     COLONOSCOPY N/A 12/5/2019    Procedure: COLONOSCOPY, WITH POLYPECTOMY AND  BIOPSY;  Surgeon: Farhan Schilling MD;  Location: [facility]     ESOPHAGOSCOPY, GASTROSCOPY, DUODENOSCOPY (EGD), COMBINED N/A 12/5/2019    Procedure: ESOPHAGOGASTRODUODENOSCOPY (EGD);  Surgeon: Farhan Schilling MD;  Location:  GI     EXAM UNDER ANESTHESIA ANUS N/A 3/28/2019    Procedure: EXAM UNDER ANESTHESIA ANUS;  Surgeon: Diane Fleming MD;  Location: [facility] OR     HYSTERECTOMY, VAGINAL  2006    with Dr. Licha Zhou - with BSO for fibroids      OPEN REDUCTION INTERNAL FIXATION ANKLE Left at age 27    plates and screws removed at age 36     Pelviscopy with removal of bilateral hydrosalpinges.  04/15/2010     MEDICATIONS: No current facility-administered medications on file prior to encounter.   furosemide (LASIX) 40 MG tablet, Take 1 tablet (40 mg) by mouth daily  Milk Thistle-Dand-Fennel-Licor (MILK THISTLE XTRA) CAPS capsule, Take 1 capsule by mouth daily  Multiple Vitamins-Minerals (MULTIVITAMIN & MINERAL PO), Take  by mouth daily.  spironolactone (ALDACTONE) 100 MG tablet, Take 1 tablet (100 mg) by mouth daily  ursodiol (ACTIGALL) 250 MG tablet, Take 1.5 tablets (375 mg) by mouth 2 times daily                                        piperacillin-tazobactam  3.375 g Intravenous Q6H     spironolactone  100 mg Oral Daily     ursodiol  375 mg Oral BID     FAMILY HISTORY:    Reviewed and non-contributory  ALLERGIES: This patient is allergic to is allergic to fish oil; metronidazole; naphthalenemethylamines; penicillins; ppd [tuberculin purified protein derivative]; and sulfa drugs.   REVIEW OF SYSTEMS: Negative except the HPI.   PHYSICAL EXAMINATION:   GENERAL: Pleasant, well-developed, well-nourished female, not ill-appearing.   /68   Pulse 91   Temp 98.6  F (37  C) (Temporal)   Resp 18   LMP 05/31/2006   SpO2 97%   HEENT: Normocephalic, atraumatic. No scleral icterus.   NECK/LYMPH: Supple.  No adenopathy.   LUNGS: Respirations unlabored.   CARDIOVASCULAR: Regular rhythm and rate, no  murmurs.  ABDOMEN:  Abdomen is flat. Tenderness, minimal RUQ without guarding.  The gallbladder is nonpalpable and very minimally tender. normal bowel sounds.  No hernia, scars noted.   EXTREMITIES: Without edema or deformity.  NEUROLOGIC: Alert and oriented, moves all extremities with good strength. Speech clear.   LABORATORY DATA: WBC-   WBC   Date Value Ref Range Status   04/21/2020 4.6 4.0 - 11.0 10e9/L Final   , HgB-   Hemoglobin   Date Value Ref Range Status   04/21/2020 14.1 11.7 - 15.7 g/dL Final     Liver function studies: Total Bilirubin 2.0, Alkaline Phosphatase 169, ALT 38, AST 57, Lipase 284.    IMAGING:  All imaging personally reviewed  Gallbladder ultrasound:     IMPRESSION:  1. A few gallstones are present within a mildly thick-walled gallbladder. Additionally, there is a trace amount of pericholecystic fluid and the ultrasound technologist reports that the patient has focal tenderness over the gallbladder. These findings   are suggestive of, but not diagnostic of, acute cholecystitis. Recommend clinical correlation. If clinically relevant, a HIDA scan could be considered for further evaluation.  2. Mild dilatation of the common duct, which measures 0.8 cm in diameter. No intrahepatic biliary dilatation.  3. Mild diffuse fatty infiltration of the liver.    Ultrasound from 3/17/2020:  IMPRESSION:    1. Minimally heterogeneously echoic liver. Liver is otherwise  unremarkable.  2. Splenomegaly indicates possible portal venous hypertension.  3. Extensive cholelithiasis and wall thickening of the gallbladder  without sonographic Gil's sign or pericholecystic fluid to confirm  acute cholecystitis. Recommend clinical correlation.  4. Appropriate hepatopedal blood flow in the portal venous system,  splenic vein and hepatic artery and hepatofugal flow in the hepatic  veins and inferior vena cava on color and Doppler spectral evaluation.       ASSESSMENT AND PLAN: Abiola Matute  has Chronic Cholecystitis  with an escalating pattern of pain.  She is a high risk surgical patient due to her underling liver disease.  She is scheduled to consult with Dr Estrada today and I am encouraging her to do this consult by phone.  I have sent him a staff message to let him know of her admission here at [facility] and her current clinical state.   Unable to calculate current MELD score as no INR done.    Agree with continued conservative management for now.  Consideration for surgery should be at the [facility].  LEONEL HESS MD     Please route or send letter to:  Primary Care Provider (PCP), Referring Provider and Dr Wilner Estrada

## 2020-04-21 NOTE — PROGRESS NOTES
Report given to Pat luke at [facility] regarding patient transfer to unit 7B.  Pt  coming to pick her up at 1530 and transfer her.

## 2020-04-21 NOTE — NURSING NOTE
Abiola AVA Matute presents to the clinic for a consultation with Dr. Wilner Estrada regarding gallbladder problems and/or abdominal pain.  Imaging was completed at this location.      Allergies:    Allergies   Allergen Reactions     Fish Oil      Redness and itching around eye area only - went away when fish oil capsules stopped      Metronidazole      pain/itching     Naphthalenemethylamines      Lamisil = mild urticarial reaction     Penicillins Cramps     Vomiting     Ppd [Tuberculin Purified Protein Derivative]      Sulfa Drugs      hives       Current medications:  No current facility-administered medications for this visit.   No current outpatient medications on file.    Facility-Administered Medications Ordered in Other Visits:      acetaminophen (TYLENOL) tablet 650 mg, 650 mg, Oral, Q4H PRN, George Diaz MD     dextrose 5% and 0.45% NaCl + KCl 20 mEq/L infusion, , Intravenous, Continuous, George Diaz MD, Last Rate: 50 mL/hr at 04/21/20 0745     HYDROmorphone (PF) (DILAUDID) injection 0.2 mg, 0.2 mg, Intravenous, Q2H PRN, George Diaz MD, 0.5 mg at 04/21/20 1051     HYDROmorphone (PF) (DILAUDID) injection 0.5 mg, 0.5 mg, Intravenous, Q1H PRN, Johan Foss MD, 0.5 mg at 04/21/20 0614     melatonin tablet 1 mg, 1 mg, Oral, At Bedtime PRN, George Diaz MD     morphine (PF) injection 4 mg, 4 mg, Intravenous, Q15 Min PRN, Johan Foss MD, 4 mg at 04/21/20 0340     naloxone (NARCAN) injection 0.1-0.4 mg, 0.1-0.4 mg, Intravenous, Q2 Min PRN, George Diaz MD     ondansetron (ZOFRAN-ODT) ODT tab 4 mg, 4 mg, Oral, Q6H PRN **OR** ondansetron (ZOFRAN) injection 4 mg, 4 mg, Intravenous, Q6H PRN, George Diaz MD     oxyCODONE (ROXICODONE) tablet 5 mg, 5 mg, Oral, Q4H PRN, George Diaz MD     piperacillin-tazobactam (ZOSYN) infusion 3.375 g, 3.375 g, Intravenous, Q6H, George Diaz MD, 3.375 g at 04/21/20 2168     polyethylene glycol  (MIRALAX) Packet 17 g, 17 g, Oral, Daily PRN, George Diaz MD     senna-docusate (SENOKOT-S/PERICOLACE) 8.6-50 MG per tablet 1 tablet, 1 tablet, Oral, BID PRN **OR** senna-docusate (SENOKOT-S/PERICOLACE) 8.6-50 MG per tablet 2 tablet, 2 tablet, Oral, BID PRN, George Diaz MD     spironolactone (ALDACTONE) tablet 100 mg, 100 mg, Oral, Daily, George Diaz MD     ursodiol (ACTIGALL) 375 mg, 375 mg, Oral, BID, George Diaz MD

## 2020-04-21 NOTE — ED PROVIDER NOTES
History     Chief Complaint:  Abdominal Pain    The history is provided by the patient.      Abiola Matute is a 54 year old female, with history of biliary colic, alcohol abuse, alcohol fatty liver, alchol cirrhosis of liver with ascites, several perianal Crohn's disease, IBS, cholelithiasis amongst others as noted below, who presents alone for evaluation of bilateral sharp upper abdominal pain that radiates into the back with associated vomiting that began at 2030 prior. Concerned it was her gallbladder, she was prompted to present. Of note, patient was evaluated for similar symptoms in October at the [facility], did not have surgery and was discharged with antibiotics.     Here, patient reports some bloating and gas, but denies any fever or current alcohol use (last use in August 2019).    Allergies:  Metronidazole  Naphthalenemethylamines  Penicillins  Ppd  Sulfa drugs     Medications:    Lasix  Aldactone  Actigall    Past Medical History:    Atypical ductal hyperplasia of breast  Bifid uvula  IBS  Hypertension  Obesity  Fear of flying  Idiopathic thrombocytopenia  Alcohol fatty liver  Alcoholic cirrhosis of liver with ascites  Biliary colic   Several Perianal Crohn's disease  Gastroenteritis   Diffuse nodule cirrhosis of liver  Alcohol abuse  Cholelithiasis    Past Surgical History:    Biopsy anal  Breast biopsy - left  Appendectomy  Colonoscopy x3  EGD, combined  Exam under anesthesia anus  Closed tx bimalleolar ankle fx without manipulation - left ankle ORIF  Hysterectomy with BSO for fibroids  Pelviscopy with removal of bilateral hydrosalpinges    Family History:    Mother - breast cancer, gastrointestinal disease  Father - heart disease  Sister - cancer    Social History:  The patient was unaccompanied to the ED.  Smoking Status: Never  Smokeless Tobacco: Never  Alcohol Use: Yes  Drug Use: No   Marital Status:        Review of Systems   Constitutional: Negative for fever.    Gastrointestinal: Positive for abdominal pain and vomiting.        Bloating and gas   All other systems reviewed and are negative.      Physical Exam     Patient Vitals for the past 24 hrs:   BP Temp Temp src Heart Rate Resp SpO2   04/21/20 0356 -- -- -- -- -- 97 %   04/21/20 0355 131/85 -- -- 91 -- --   04/21/20 0216 -- 97.5  F (36.4  C) Temporal 69 18 99 %       Physical Exam  Nursing note and vitals reviewed.  Constitutional: Cooperative.   HENT:   Mouth/Throat: Mucous membranes are normal.   Cardiovascular: Normal rate, regular rhythm and normal heart sounds.  No murmur.  Pulmonary/Chest: Effort normal and breath sounds normal. No respiratory distress. No wheezes. No rales.   Abdominal: Soft. Normal appearance and bowel sounds are normal. No distension. There is right upper quadrant tenderness. There is no rigidity and no guarding.   Neurological: Alert. Oriented x4  Skin: Skin is warm and dry.   Psychiatric: Normal mood and affect.      Emergency Department Course     Imaging:  Radiology findings were communicated with the patient who voiced understanding of the findings.    US Abdomen Limited:  IMPRESSION:   1. A few gallstones are present within a mildly thick-walled gallbladder. Additionally, there is a trace amount of pericholecystic fluid and the ultrasound technologist reports that the patient has focal tenderness over the gallbladder. These findings   are suggestive of, but not diagnostic of, acute cholecystitis. Recommend clinical correlation. If clinically relevant, a HIDA scan could be considered for further evaluation.   2. Mild dilatation of the common duct, which measures 0.8 cm in diameter. No intrahepatic biliary dilatation.   3. Mild diffuse fatty infiltration of the liver.   Reading per radiology.     Laboratory:  Laboratory findings were communicated with the patient who voiced understanding of the findings.    CBC: PLT 98 (L) o/w WNL (WBC 4.6, HGB 14.1)  CMP: Potassium 3.3 (L), Bilirubin  total 2.0 (H), Alkphos 169 (H), Ast 57 (H) o/w WNL (Creatinine 0.68)  Lipase: 284      Interventions:  0235 0.9% NaCl Bolus 1000 mL IV   0300 Morphine 4 mg IV  0300 Zofran 4 mg IV  0340 Morphine 4 mg IV  0355 Zosyn 4.5 g IV    Emergency Department Course:  Past medical records, nursing notes, and vitals reviewed.    IV was inserted and blood was drawn for laboratory testing, results above.    (0239)   I performed an exam of the patient as documented above. History obtained from patient.    The patient was sent for a US Abdomen Limited while in the emergency department, results above.      (0356)   I rechecked the patient and discussed the results of her workup thus far. Discussed plan of care and admission is indicated. Patient is agreeable to this plan.     (3471)   I spoke with Dr. Diaz of the Hospitalist service regarding patient's presentation, findings, and plan of care, who agrees to accept patient for further care, evaluation and monitoring.      Findings and plan explained to the Patient who consents to admission. Discussed the patient with Dr. Diaz, who will admit the patient to a med surg bed for further monitoring, evaluation, and treatment.    I personally reviewed the laboratory and imaging results with the Patient and answered all related questions prior to admission.     Impression & Plan     Medical Decision Making:  Ms. Matute is a 54 year old female with alcoholic liver disease who presents with pain consistent with previous episodes of cholecystitis. Given her underlying liver disease, they've opted for medical management in the past. Work up here is consistent with acute cholecystitis. She has gallstones with thickened gallbladder wall and pericholecystic fluid and a good clinical story. Suspicion for other etiology was low and necessitated for further imaging are low. Labs were reviewed showing stigmata of alcoholic liver disease, but no other acute findings. Pain controlled as above. I have  the antibiotics initiated and she will be admitted at this time in stable condition.     Diagnosis:    ICD-10-CM    1. Acute cholecystitis  K81.0    2. Alcoholic liver disease  K70.9    3. Thrombocytopenia  D69.6        Disposition:  Admitted to Med Surg.    Scribe Disclosure:  I, Hannah Mays, am serving as a scribe at 2:25 AM on 4/21/2020 to document services personally performed by Johan Foss MD based on my observations and the provider's statements to me.   4/21/2020   [facility] EMERGENCY DEPARTMENT       Johan Foss MD  04/21/20 0455

## 2020-04-21 NOTE — ED NOTES
[facility]  ED Nurse Handoff Report    Abiola Matute is a 54 year old female   ED Chief complaint: Abdominal Pain  . ED Diagnosis:   Final diagnoses:   Acute cholecystitis   Alcoholic liver disease (H)   Thrombocytopenia (H)     Allergies:   Allergies   Allergen Reactions     Fish Oil      Redness and itching around eye area only - went away when fish oil capsules stopped      Metronidazole      pain/itching     Naphthalenemethylamines      Lamisil = mild urticarial reaction     Penicillins Cramps     Vomiting     Ppd [Tuberculin Purified Protein Derivative]      Sulfa Drugs      hives       Code Status: Full Code  Activity level - Baseline/Home:  Independent. Activity Level - Current:   Independent. Lift room needed: No. Bariatric: No   Needed: No   Isolation: No. Infection: Not Applicable.     Vital Signs:   Vitals:    04/21/20 0216 04/21/20 0355 04/21/20 0356   BP:  131/85    Pulse:  91    Resp: 18     Temp: 97.5  F (36.4  C)     TempSrc: Temporal     SpO2: 99%  97%       Cardiac Rhythm:  ,      Pain level:    Patient confused: No. Patient Falls Risk: Yes.   Elimination Status: Has voided   Patient Report - Initial Complaint: Epigastric Pain. Focused Assessment: Epigastric pain, positive Gil's sign, nausea, generalized weakness, decreased appetite   Tests Performed:   Labs Ordered and Resulted from Time of ED Arrival Up to the Time of Departure from the ED   CBC WITH PLATELETS DIFFERENTIAL - Abnormal; Notable for the following components:       Result Value    MCH 34.1 (*)     Platelet Count 98 (*)     All other components within normal limits   COMPREHENSIVE METABOLIC PANEL - Abnormal; Notable for the following components:    Potassium 3.3 (*)     Glucose 125 (*)     Bilirubin Total 2.0 (*)     Alkaline Phosphatase 169 (*)     AST 57 (*)     All other components within normal limits   LIPASE   IV ACCESS   PULSE OXIMETRY NURSING     US Abdomen Limited   Final Result   IMPRESSION:    1. A few gallstones are present within a mildly thick-walled gallbladder. Additionally, there is a trace amount of pericholecystic fluid and the ultrasound technologist reports that the patient has focal tenderness over the gallbladder. These findings    are suggestive of, but not diagnostic of, acute cholecystitis. Recommend clinical correlation. If clinically relevant, a HIDA scan could be considered for further evaluation.   2. Mild dilatation of the common duct, which measures 0.8 cm in diameter. No intrahepatic biliary dilatation.   3. Mild diffuse fatty infiltration of the liver.           . Abnormal Results: See above.   Treatments provided: IVF, Abx, Zofran, Morphine  Family Comments: N/A  OBS brochure/video discussed/provided to patient:  N/A  ED Medications:   Medications   morphine (PF) injection 4 mg (4 mg Intravenous Given 4/21/20 0340)   piperacillin-tazobactam (ZOSYN) intermittent infusion 4.5 g (4.5 g Intravenous New Bag 4/21/20 0355)   HYDROmorphone (PF) (DILAUDID) injection 0.5 mg (has no administration in time range)   0.9% sodium chloride BOLUS (0 mLs Intravenous Stopped 4/21/20 0353)   ondansetron (ZOFRAN) injection 4 mg (4 mg Intravenous Given 4/21/20 0300)     Drips infusing:  No  For the majority of the shift, the patient's behavior Green. Interventions performed were N /A.    Sepsis treatment initiated: No       ED Nurse Name/Phone Number: Mitchell Elizondo RN,   4:07 AM  RECEIVING UNIT ED HANDOFF REVIEW    Above ED Nurse Handoff Report was reviewed: Yes  Reviewed by: Elke Marie on April 21, 2020 at 6:20 AM   Did you vocera the ED RN: Yes

## 2020-04-21 NOTE — TELEPHONE ENCOUNTER
Late entry: Called patient to re-schedule and patient had already re-scheduled. I informed patient to let us know if she is seen for consult in-patient to let us know.    Angel Jean, EMT  Colon and Rectal Surgery

## 2020-04-21 NOTE — ED TRIAGE NOTES
"Pt states upper abdominal pain with vomiting, states \"it might be my gallbladder\". ABCs intact GCS 15  "

## 2020-04-21 NOTE — PROGRESS NOTES
Patient spoke with Dr. Estrada this morning regarding gallbladder treatment recommendations.      Patient would like Dr. Estrada to call her spouse, Albino [phone], to explain cholecystostomy tube vs surgical procedure.

## 2020-04-21 NOTE — H&P
Admitted:     04/21/2020      CHIEF COMPLAINT:  Abdominal pain.      HISTORY OF PRESENT ILLNESS:  Abiola Matute is a 54-year-old female with significant past medical history for alcoholic liver cirrhosis, Crohn's disease, acute cholecystitis diagnosed in 10/2019.  At the time, patient was advised to be treated with antibiotics conservatively and to follow up with [facility] General Surgery team.  The patient stated she has an appointment to discuss with [facility] physicians tomorrow over video.  The patient also stated that she has a history of Crohn's disease and irritable bowel syndrome.  She also has cholelithiasis with her cholecystitis.  She presented to the emergency room by herself due to upper abdominal pain that radiated to her back, associated with episodes of vomiting 3 times.  She stated she did not have vomiting the last time she had bout of pain and  diagnosed with cholecystitis.  The patient denied any fever or chills.  She denied any cough, no runny nose, no sore throat.  She denied any diarrhea or constipation.  No bowel habit change.  As mentioned above, she has a history of end-stage liver disease related to alcoholism, the last time she had alcohol was in 08/2019.      ALLERGIES:   Allergies   Allergen Reactions     Fish Oil      Redness and itching around eye area only - went away when fish oil capsules stopped      Metronidazole      pain/itching     Naphthalenemethylamines      Lamisil = mild urticarial reaction     Penicillins Cramps     Vomiting     Ppd [Tuberculin Purified Protein Derivative]      Sulfa Drugs      hives         HOME MEDICATIONS:  Needs to be reviewed by Pharmacy, but includes:   1.  Aldactone.   2.  Actigall.   3.  Lasix.      PAST MEDICAL HISTORY:   1.  Hypertension.   2.  Obesity.   3.  Irritable bowel syndrome.   4.  Cholelithiasis with cholecystitis.   5.  Biliary colic.   6.  Fatty liver.   7.  End-stage liver disease due to alcoholism.   8.   Gastroenteritis.   9.  Crohn's disease.      PAST SURGICAL HISTORY:   1.  Breast biopsy.   2.  Appendectomy.   3.  Colonoscopy x 3.   4.  EGD.   5.  Closed treatment of bimalleolar ankle fracture without manipulation.   6.  Hysterectomy.      FAMILY HISTORY:  Reviewed and noncontributory.      SOCIAL HISTORY:  The patient in the emergency room.  She never smoked.  She stopped drinking alcohol in 08/2019.  She does not use illicit drugs.      REVIEW OF SYSTEMS:  Ten points reviewed, all are negative except those mentioned in history of present illness.      Prior to Admission Medications   Prescriptions Last Dose Informant Patient Reported? Taking?   Milk Thistle-Dand-Fennel-Licor (MILK THISTLE XTRA) CAPS capsule 4/20/2020 at Unknown time  Yes Yes   Sig: Take 1 capsule by mouth daily   Multiple Vitamins-Minerals (MULTIVITAMIN & MINERAL PO) 4/20/2020 at Unknown time  Yes Yes   Sig: Take  by mouth daily.   furosemide (LASIX) 40 MG tablet 4/20/2020 at Unknown time  No Yes   Sig: Take 1 tablet (40 mg) by mouth daily   spironolactone (ALDACTONE) 100 MG tablet 4/20/2020 at Unknown time  No Yes   Sig: Take 1 tablet (100 mg) by mouth daily   ursodiol (ACTIGALL) 250 MG tablet 4/19/2020 at Unknown time  No Yes   Sig: Take 1.5 tablets (375 mg) by mouth 2 times daily      Facility-Administered Medications: None     PHYSICAL EXAMINATION:   GENERAL:  The patient is awake, alert, oriented, comfortable, not in any form of distress.   VITAL SIGNS:  Blood pressure 127/89, pulse rate 91, temperature 97.5, oxygen saturation 98%.   HEENT:  Pink, nonicteric.  Extraocular muscle movement intact.   NECK:  Supple, no JVD, no thyromegaly.   CHEST:  Good air entry bilaterally.  No wheezing, crackles or rales.   CARDIOVASCULAR:  S1, S2 regular, no gallop or murmur.   ABDOMEN:  Obese, soft, nontender, nondistended, positive bowel sounds, no organomegaly, no sign for fluid shift.   NEUROLOGIC:  No focal neurologic deficit.  Cranial nerves  grossly intact.  Alert and oriented x 3.   PSYCHIATRIC:  Normal mood and affect, keeps eye contact, responds to question appropriately.   EXTREMITIES:  No edema, cyanosis or clubbing.      DIAGNOSTIC TESTS OF INTEREST:  Ultrasound of the abdomen reported a few gallstones present with mildly thickened gallbladder wall.  Trace amount of pericholecystic fluid and also focal tenderness of the gallbladder.  Findings are suggestive of acute cholecystitis, mild diffuse fatty liver.      LABORATORY DATA:  Sodium 137, potassium 3.3, BUN 13, creatinine 0.6, albumin 3.2, bilirubin 2.0, ALT 38, AST 57.  WBC 4.6, hemoglobin 14.1, platelets 96.  INR was not done.      ASSESSMENT AND PLAN:  Abiola Matute is a 54-year-old female with past medical history significant for alcoholic liver cirrhosis, Crohn's disease who was diagnosed with cholelithiasis with cholecystitis in 10/2019 treated conservatively with antibiotics, came in today with abdominal pain and ultrasound evidence for suspected cholecystitis.   1.  Cholecystitis.  This is acute on chronic for this patient.  She had a history of cholecystitis in the past treated with antibiotics, felt better, again started to have similar symptoms, actually got worse with nausea and episodes of vomiting.  She has positive Gil sign based on the ultrasound.  She also had right upper quadrant tenderness on exam.  The patient will be on IV antibiotic Zosyn.  Will get general surgeon to see this patient while she is here.  In the past, she was seen at [facility] and plan was to conservatively treat her and she will follow up with them which is scheduled for tomorrow over video.  Despite that, the patient developed symptoms and came here.  Will keep her n.p.o., start her on IV fluids for now.   2.  Hypokalemia:  Replace potassium per protocol.  Check magnesium level.   3.  End-stage liver disease secondary to alcoholic cirrhosis.  Last drink, per patient, was in August.   There was some discrepancy if the last drink was in September.  I doubt the accuracy of her history related to alcohol.   4.  History of Crohn's disease.  She has followup with [place].  No sign of exacerbation.  She is on Remicade every 8 weeks.      I discussed with the patient at length the plan of care.  All her questions and concerns addressed, showed understanding.      CODE STATUS:  In the event of cardiorespiratory arrest, she is full code.         MATTHIAS JOINER MD             D: 2020   T: 2020   MT: GENI      Name:     JOAN MCNAMARA   MRN:      [id number]        Account:      [id number]   :      1965        Admitted:     2020                   Document: [id number]

## 2020-04-21 NOTE — PROGRESS NOTES
"Abiola Matute is a 54 year old female who is being evaluated via a billable video visit.      The patient has been notified of following:     \"This video visit will be conducted via a call between you and your physician/provider. We have found that certain health care needs can be provided without the need for an in-person physical exam.  This service lets us provide the care you need with a video conversation.  If a prescription is necessary we can send it directly to your pharmacy.  If lab work is needed we can place an order for that and you can then stop by our lab to have the test done at a later time.    Video visits are billed at different rates depending on your insurance coverage.  Please reach out to your insurance provider with any questions.    If during the course of the call the physician/provider feels a video visit is not appropriate, you will not be charged for this service.\"    Patient has given verbal consent for Video visit? Yes    How would you like to obtain your AVS? Sukhwinderhart    Patient would like the video invitation sent by: Text to cell phone: Artemio    Will anyone else be joining your video visit? Yes: Text. How would they like to receive their invitation? Text to cell phone: #      Video Start Time: 11:30     Complex patient with Advanced liver disease and known symptomatic cholelithiasis. Hospitalized for another acute attack.  Options include surgery (high risk) vs percutaneous drain.  Discussed options with patient who understands reticence to proceed with surgery. Will plan on transfer of patient to the [facility] for eval for conservative management.      Video-Visit Details    Type of service:  Video Visit    Video End Time (time video stopped): 11:50    Originating Location (pt. Location): [facility]    Distant Location (provider location):  [facility] GENERAL SURGERY     Mode of Communication:  Video Conference via [employer]      ANNA                "

## 2020-04-21 NOTE — DISCHARGE SUMMARY
[facility]  Hospitalist Discharge Summary      Date of Admission:  4/21/2020  Date of Discharge:  4/21/2020  Discharging Provider: Ty Álvarez DO      Discharge Diagnoses   1.  Acute on chronic cholecystitis.  Patient has been on IV Zosyn during hospital stay here.  She was seen in consultation by our general surgeon.  She also did participate and in her previously scheduled video consultation with general surgery at the [facility] today.  Surgeon at [facility], Dr. Estrada, is requesting transfer to [facility] for further management.  Transfer patient to [facility] for further management.    2.  Cirrhosis.    3.  Crohn's disease.    4.  Hypertension.    5.  Hypokalemia.  Potassium replacement protocol.      Follow-ups Needed After Discharge   Transfer to [facility].        Discharge Disposition   Transfer patient to [facility] today.      Consultations This Hospital Stay   SURGERY GENERAL IP CONSULT    Code Status   Full Code    Time Spent on this Encounter   I spent 20 minutes with Ms. Matute and working on discharge on 4/21/2020.       Ty Álvarez DO  [facility]  ______________________________________________________________________    Physical Exam   Vital Signs: Temp: 98.6  F (37  C) Temp src: Temporal BP: 125/68 Pulse: 91 Heart Rate: 82 Resp: 18 SpO2: 97 % O2 Device: None (Room air)    Weight: 0 lbs 0 oz  Gen:  NAD, A&Ox3.  Eyes:  PERRL, sclera anicteric.  OP:  MMM, no lesions.  Neck:  Supple.  CV:  Regular, no murmurs.  Lung:  CTA b/l, normal effort.  Ab:  +BS, soft.  Skin:  Warm, dry to touch.  No rash.  Ext:  No pitting edema LE b/l.         Primary Care Physician   Physician No Ref-Primary    Discharge Orders   No discharge procedures on file.        Discharge Medications   Current Discharge Medication List      CONTINUE these medications which have NOT CHANGED    Details   furosemide (LASIX) 40 MG tablet Take 1 tablet (40 mg) by mouth daily  Qty: 30 tablet,  Refills: 3    Associated Diagnoses: Decompensation of cirrhosis of liver (H)      Milk Thistle-Dand-Fennel-Licor (MILK THISTLE XTRA) CAPS capsule Take 1 capsule by mouth daily      Multiple Vitamins-Minerals (MULTIVITAMIN & MINERAL PO) Take  by mouth daily.    Associated Diagnoses: Routine general medical examination at a health care facility; Iron deficiency anemia, unspecified      spironolactone (ALDACTONE) 100 MG tablet Take 1 tablet (100 mg) by mouth daily  Qty: 30 tablet, Refills: 3    Associated Diagnoses: Decompensation of cirrhosis of liver (H)      ursodiol (ACTIGALL) 250 MG tablet Take 1.5 tablets (375 mg) by mouth 2 times daily  Qty: 90 tablet, Refills: 0    Associated Diagnoses: Gallstones           Allergies   Allergies   Allergen Reactions     Fish Oil      Redness and itching around eye area only - went away when fish oil capsules stopped      Metronidazole      pain/itching     Naphthalenemethylamines      Lamisil = mild urticarial reaction     Penicillins Cramps     Vomiting     Ppd [Tuberculin Purified Protein Derivative]      Sulfa Drugs      hives

## 2020-04-21 NOTE — H&P
General Surgery H&P    Abiola Matute MRN# [id number]   Age: 54 year old YOB: 1965     Date of Admission:  4/21/202                     Assessment and Plan:   Assessment:   55 yo female with alcoholic liver cirrhosis meld score 12 presents with symptoms of cholecystitis         Plan:   - admit to gen surg   - HIDA in AM, cholecystostomy tube if evidence of cholecystitis.   - NPO after MN, IV abx       Discussed with staff     Rin Chávez MD  General surgery resident             Chief Complaint:   RUQ abdominal pain            History of Present Illness:   55 yo female with hx of alcoholic liver cirrhosis , quit in August, Meld 12, crohns on remicade every 8 weeks last given at the end of march, open appendectomy, lap hysterectomy was admitted in Oct 2019 with acute cholecystitis was managed with abx at the time, deferred surgical intervention due to ascites, she had paracentesis once, now on lasix and spirolactone, presented with intermittent RUQ pain for 3 weeks, denies fever or chills, not associated with certain food, she was referred from [facility] for management, normal wbc, US with cholelithiasis, 0.4 cm GB wall, pericholecystic fluid.               Past Medical History:     Past Medical History:   Diagnosis Date     Alcohol abuse      Alcoholic cirrhosis of liver with ascites      Atypical ductal hyperplasia of breast 9/10/10    ERT not recommended -left - and flat epithelial atypia-scheduled for breast biopsy 9/17/2010      Bifid uvula      Cholelithiasis      Contact perianal dermatitis and other eczema     recurrent - clobetasol      Crohn disease      Fear of flying      - gets Ativan prn.      Hypertension      IBS (irritable bowel syndrome)                Past Surgical History:     Past Surgical History:   Procedure Laterality Date     BIOPSY ANAL N/A 3/28/2019    anal biopsy and culure placement of seton - Dr Fleming     [facility]17[facility]2010    [facility]      [facility]SY LIVER  2019     C APPENDECTOMY  at age 14     COLONOSCOPY  2006     COLONOSCOPY N/A 12/23/2014    Procedure: COMBINED COLONOSCOPY, SINGLE OR MULTIPLE BIOPSY/POLYPECTOMY BY BIOPSY;  Surgeon: Diane Fleming MD;  Location:  GI     COLONOSCOPY N/A 12/5/2019    Procedure: COLONOSCOPY, WITH POLYPECTOMY AND BIOPSY;  Surgeon: Farhan Schilling MD;  Location:  GI     ESOPHAGOSCOPY, GASTROSCOPY, DUODENOSCOPY (EGD), COMBINED N/A 12/5/2019    Procedure: ESOPHAGOGASTRODUODENOSCOPY (EGD);  Surgeon: Farhan Schilling MD;  Location:  GI     EXAM UNDER ANESTHESIA ANUS N/A 3/28/2019    Procedure: EXAM UNDER ANESTHESIA ANUS;  Surgeon: Diane Fleming MD;  Location:  OR     HYSTERECTOMY, VAGINAL  2006    with Dr. Licha Zhou - with BSO for fibroids      OPEN REDUCTION INTERNAL FIXATION ANKLE Left at age 27    plates and screws removed at age 36     Pelviscopy with removal of bilateral hydrosalpinges.  04/15/2010     No bleeding or reaction to anesthesia           Social History:     Social History     Tobacco Use     Smoking status: Never Smoker     Smokeless tobacco: Never Used   Substance Use Topics     Alcohol use: Yes     Alcohol/week: Quit in August 2019     Drugs: no                Family History:     Family History   Problem Relation Age of Onset     Breast Cancer Mother      Gastrointestinal Disease Mother      Heart Disease Father 57     Heart Disease Paternal Grandfather      Hypertension Maternal Grandmother      Diabetes Paternal Grandmother      Diabetes Maternal Grandfather      Cancer Sister                   Allergies:     Allergies   Allergen Reactions     Fish Oil      Redness and itching around eye area only - went away when fish oil capsules stopped      Metronidazole      pain/itching     Naphthalenemethylamines      Lamisil = mild urticarial reaction     Penicillins Cramps     Vomiting     Ppd [Tuberculin Purified Protein Derivative]      Sulfa Drugs      hives              Medications:     Current Facility-Administered Medications   Medication     furosemide (LASIX) tablet 40 mg     lidocaine (LMX4) cream     lidocaine 1 % 0.1-1 mL     melatonin tablet 1 mg     naloxone (NARCAN) injection 0.1-0.4 mg     ondansetron (ZOFRAN-ODT) ODT tab 4 mg    Or     ondansetron (ZOFRAN) injection 4 mg     oxyCODONE (ROXICODONE) tablet 5-10 mg     polyethylene glycol (MIRALAX) Packet 17 g     senna-docusate (SENOKOT-S/PERICOLACE) 8.6-50 MG per tablet 1 tablet    Or     senna-docusate (SENOKOT-S/PERICOLACE) 8.6-50 MG per tablet 2 tablet     sodium chloride (PF) 0.9% PF flush 3 mL     sodium chloride (PF) 0.9% PF flush 3 mL     spironolactone (ALDACTONE) tablet 100 mg     ursodiol (ACTIGALL) half-tab 375 mg               Review of Systems:     See HPI above for pertinent findings.          Physical Exam:   All vitals have been reviewed  Temp:  [97.5  F (36.4  C)-98.6  F (37  C)] 97.7  F (36.5  C)  Pulse:  [91] 91  Heart Rate:  [69-82] 72  Resp:  [18] 18  BP: (113-131)/(68-89) 113/69  SpO2:  [95 %-99 %] 99 %  No intake or output data in the 24 hours ending 04/21/20 1711  Physical Exam:     Alert and oriented  Non labored breathing on RA   Non cyanotic   Soft abdomen, non distended, tender at RUQ        Data:   All laboratory data reviewed    Results:  BMP  Recent Labs   Lab 04/21/20 0226      POTASSIUM 3.3*   CHLORIDE 105   CO2 23   BUN 13   CR 0.68   *     CBC  Recent Labs   Lab 04/21/20 0226   WBC 4.6   HGB 14.1   PLT 98*     LFT  Recent Labs   Lab 04/21/20 0226   AST 57*   ALT 38   ALKPHOS 169*   BILITOTAL 2.0*   ALBUMIN 3.4     Recent Labs   Lab 04/21/20 0226   *       Imaging:  US RUQ                                                                   IMPRESSION:  1. A few gallstones are present within a mildly thick-walled gallbladder. Additionally, there is a trace amount of pericholecystic fluid and the ultrasound technologist reports that the patient has focal  tenderness over the gallbladder. These findings   are suggestive of, but not diagnostic of, acute cholecystitis. Recommend clinical correlation. If clinically relevant, a HIDA scan could be considered for further evaluation.  2. Mild dilatation of the common duct, which measures 0.8 cm in diameter. No intrahepatic biliary dilatation.  3. Mild diffuse fatty infiltration of the liver.

## 2020-04-21 NOTE — PHARMACY-ADMISSION MEDICATION HISTORY
Admission medication history was completed earlier today at [facility] prior to transfer. See Pharmacy note in EPIC from 4/21/2020 at 1105 for details.  Thank you.  Marianna Skaggs, PharmD  Pager: [phone]

## 2020-04-22 ENCOUNTER — APPOINTMENT (OUTPATIENT)
Dept: INTERVENTIONAL RADIOLOGY/VASCULAR | Facility: CLINIC | Age: 55
DRG: 445 | End: 2020-04-22
Attending: NURSE PRACTITIONER
Payer: COMMERCIAL

## 2020-04-22 ENCOUNTER — APPOINTMENT (OUTPATIENT)
Dept: NUCLEAR MEDICINE | Facility: CLINIC | Age: 55
DRG: 445 | End: 2020-04-22
Attending: STUDENT IN AN ORGANIZED HEALTH CARE EDUCATION/TRAINING PROGRAM
Payer: COMMERCIAL

## 2020-04-22 ENCOUNTER — PRE VISIT (OUTPATIENT)
Dept: SURGERY | Facility: CLINIC | Age: 55
End: 2020-04-22

## 2020-04-22 LAB — RADIOLOGIST FLAGS: ABNORMAL

## 2020-04-22 PROCEDURE — 25000128 H RX IP 250 OP 636: Performed by: PREVENTIVE MEDICINE

## 2020-04-22 PROCEDURE — 27210732 ZZH ACCESSORY CR1

## 2020-04-22 PROCEDURE — A9537 TC99M MEBROFENIN: HCPCS | Performed by: SURGERY

## 2020-04-22 PROCEDURE — 27210886 ZZH ACCESSORY CR5

## 2020-04-22 PROCEDURE — 76705 ECHO EXAM OF ABDOMEN: CPT

## 2020-04-22 PROCEDURE — 25000128 H RX IP 250 OP 636: Performed by: STUDENT IN AN ORGANIZED HEALTH CARE EDUCATION/TRAINING PROGRAM

## 2020-04-22 PROCEDURE — C1729 CATH, DRAINAGE: HCPCS

## 2020-04-22 PROCEDURE — 12000001 ZZH R&B MED SURG/OB UMMC

## 2020-04-22 PROCEDURE — 40000141 ZZH STATISTIC PERIPHERAL IV START W/O US GUIDANCE

## 2020-04-22 PROCEDURE — 34300033 ZZH RX 343: Performed by: SURGERY

## 2020-04-22 PROCEDURE — 27210912 ZZH NEEDLE CR8

## 2020-04-22 PROCEDURE — 25000132 ZZH RX MED GY IP 250 OP 250 PS 637: Performed by: STUDENT IN AN ORGANIZED HEALTH CARE EDUCATION/TRAINING PROGRAM

## 2020-04-22 PROCEDURE — 78227 HEPATOBIL SYST IMAGE W/DRUG: CPT

## 2020-04-22 PROCEDURE — G0378 HOSPITAL OBSERVATION PER HR: HCPCS

## 2020-04-22 PROCEDURE — 25000128 H RX IP 250 OP 636: Performed by: SURGERY

## 2020-04-22 PROCEDURE — C1769 GUIDE WIRE: HCPCS

## 2020-04-22 PROCEDURE — 96366 THER/PROPH/DIAG IV INF ADDON: CPT

## 2020-04-22 RX ORDER — IODIXANOL 320 MG/ML
50 INJECTION, SOLUTION INTRAVASCULAR ONCE
Status: DISCONTINUED | OUTPATIENT
Start: 2020-04-22 | End: 2020-04-24 | Stop reason: HOSPADM

## 2020-04-22 RX ORDER — KIT FOR THE PREPARATION OF TECHNETIUM TC 99M MEBROFENIN 45 MG/10ML
4.8-7.2 INJECTION, POWDER, LYOPHILIZED, FOR SOLUTION INTRAVENOUS ONCE
Status: COMPLETED | OUTPATIENT
Start: 2020-04-22 | End: 2020-04-22

## 2020-04-22 RX ORDER — FENTANYL CITRATE 50 UG/ML
25-50 INJECTION, SOLUTION INTRAMUSCULAR; INTRAVENOUS EVERY 5 MIN PRN
Status: DISCONTINUED | OUTPATIENT
Start: 2020-04-22 | End: 2020-04-22

## 2020-04-22 RX ORDER — FLUMAZENIL 0.1 MG/ML
0.2 INJECTION, SOLUTION INTRAVENOUS
Status: DISCONTINUED | OUTPATIENT
Start: 2020-04-22 | End: 2020-04-22

## 2020-04-22 RX ORDER — MORPHINE SULFATE 2 MG/ML
2 INJECTION, SOLUTION INTRAMUSCULAR; INTRAVENOUS ONCE
Status: COMPLETED | OUTPATIENT
Start: 2020-04-22 | End: 2020-04-22

## 2020-04-22 RX ORDER — NALOXONE HYDROCHLORIDE 0.4 MG/ML
.1-.4 INJECTION, SOLUTION INTRAMUSCULAR; INTRAVENOUS; SUBCUTANEOUS
Status: DISCONTINUED | OUTPATIENT
Start: 2020-04-22 | End: 2020-04-22

## 2020-04-22 RX ADMIN — PIPERACILLIN AND TAZOBACTAM 3.38 G: 3; .375 INJECTION, POWDER, FOR SOLUTION INTRAVENOUS at 20:35

## 2020-04-22 RX ADMIN — PIPERACILLIN AND TAZOBACTAM 3.38 G: 3; .375 INJECTION, POWDER, FOR SOLUTION INTRAVENOUS at 01:55

## 2020-04-22 RX ADMIN — SINCALIDE 1.5 MCG: 5 INJECTION, POWDER, LYOPHILIZED, FOR SOLUTION INTRAVENOUS at 08:19

## 2020-04-22 RX ADMIN — SPIRONOLACTONE 100 MG: 50 TABLET, FILM COATED ORAL at 11:02

## 2020-04-22 RX ADMIN — MIDAZOLAM 2 MG: 1 INJECTION INTRAMUSCULAR; INTRAVENOUS at 15:40

## 2020-04-22 RX ADMIN — OXYCODONE HYDROCHLORIDE 5 MG: 5 TABLET ORAL at 11:12

## 2020-04-22 RX ADMIN — PIPERACILLIN AND TAZOBACTAM 3.38 G: 3; .375 INJECTION, POWDER, FOR SOLUTION INTRAVENOUS at 10:59

## 2020-04-22 RX ADMIN — ONDANSETRON 4 MG: 4 TABLET, ORALLY DISINTEGRATING ORAL at 11:12

## 2020-04-22 RX ADMIN — OXYCODONE HYDROCHLORIDE 10 MG: 5 TABLET ORAL at 03:20

## 2020-04-22 RX ADMIN — FENTANYL CITRATE 50 MCG: 50 INJECTION, SOLUTION INTRAMUSCULAR; INTRAVENOUS at 15:36

## 2020-04-22 RX ADMIN — SENNOSIDES AND DOCUSATE SODIUM 2 TABLET: 8.6; 5 TABLET ORAL at 20:36

## 2020-04-22 RX ADMIN — FUROSEMIDE 40 MG: 40 TABLET ORAL at 11:02

## 2020-04-22 RX ADMIN — MORPHINE SULFATE 2 MG: 2 INJECTION, SOLUTION INTRAMUSCULAR; INTRAVENOUS at 09:55

## 2020-04-22 RX ADMIN — MEBROFENIN 5.5 MCI.: 45 INJECTION, POWDER, LYOPHILIZED, FOR SOLUTION INTRAVENOUS at 08:20

## 2020-04-22 RX ADMIN — SENNOSIDES AND DOCUSATE SODIUM 2 TABLET: 8.6; 5 TABLET ORAL at 11:04

## 2020-04-22 ASSESSMENT — ACTIVITIES OF DAILY LIVING (ADL)
ADLS_ACUITY_SCORE: 11

## 2020-04-22 NOTE — PROGRESS NOTES
Patient Name: Abiola Matute  Medical Record Number: [id number]  Today's Date: 4/22/2020    Procedure: percutaneous biliary drain placement   Proceduralist: Dr.Golzarion Dr.Sharma Deleon    Procedure Start: 1510    Sedation medications administered: 2mg versed 50mcg fentanyl     Report given to: 7B RN     Pt arrived to IR room 4 from . Consent obtained/ reviewed. Pt denies any questions or concerns regarding procedure. Pt positioned supine and monitored per protocol. Procedure cancelled See provider note . Pt transferred back to .VSS

## 2020-04-22 NOTE — PLAN OF CARE
"Time: 2300 - 0730  Reason for Admission: Symptoms of cholecystitis  Vitals: /65 (BP Location: Right leg)   Temp 97.3  F (36.3  C) (Oral)   Resp 16   Ht 1.676 m (5' 6\")   Wt 77.1 kg (170 lb)   LMP 05/31/2006   SpO2 99%   BMI 27.44 kg/m       Activity: Up ad martinez  Pain: RUQ pain - PRN oxy given x 1 with relief.  Neuro: A&O x 4. Calm. Calls appropriately.    Cardiac: WDL. Denies chest pain.  Respiratory: LS clear. On RA. Denies SOB.  GI/: Voiding spontaneously. BS+. No BM this shift.   Diet: NPO  Skin: Intact  LDAs: (L) PIV infusing D5 1/2 NaCl + KCL 20 mEq at 75 mL/hr intermittent zosyn antibiotic - compatible per pharmacy.   New change for this shift: None.  Plan: Hepatobiliary scan today. Continue with POC.     "

## 2020-04-22 NOTE — PLAN OF CARE
Admit from [facility]. Afebrile,vss. Has abdominal pain and got oxycodone. Iv going at 75/hr. Zosyn started q6 hrs. Hepatobiliary scan ordered and radiology cx for possible tube insertion. Voiding and having stools. Will start clear liquids. Oriented to 7b.

## 2020-04-22 NOTE — PROGRESS NOTES
Surgery Progress Note  04/22/2020       Subjective:  NAEO. This am reports pain is improved from yesterday. No nausea. No issues voiding.     Objective:  Temp:  [96.8  F (36  C)-97.7  F (36.5  C)] 97.3  F (36.3  C)  Heart Rate:  [66-72] 69  Resp:  [16-18] 16  BP: (103-116)/(55-69) 114/65  SpO2:  [96 %-99 %] 99 %    I/O last 3 completed shifts:  In: 1012.5 [P.O.:740; I.V.:272.5]  Out: -       Gen: Awake, alert, NAD, standing up in room  CV: non-cyanotic  Resp: NLB on RA  Abd: soft, nondistended, tender over RUQ     Labs:  Recent Labs   Lab 04/21/20  1830 04/21/20  0226   WBC 4.5 4.6   HGB 12.5 14.1   PLT 88* 98*       Recent Labs   Lab 04/21/20  1735 04/21/20  0226    137   POTASSIUM 4.7 3.3*   CHLORIDE 109 105   CO2 26 23   BUN 10 13   CR 0.66 0.68   GLC 96 125*   ARIEL 8.9 9.4       Imaging:  US RUQ:  1. A few gallstones are present within a mildly thick-walled gallbladder. Additionally, there is a trace amount of pericholecystic fluid and the ultrasound technologist reports that the patient has focal tenderness over the gallbladder. These findings   are suggestive of, but not diagnostic of, acute cholecystitis. Recommend clinical correlation. If clinically relevant, a HIDA scan could be considered for further evaluation.  2. Mild dilatation of the common duct, which measures 0.8 cm in diameter. No intrahepatic biliary dilatation.  3. Mild diffuse fatty infiltration of the liver.        Assessment/Plan:   53 yo female with alcoholic liver cirrhosis (meld score 12) presents with symptoms of acute on chronic cholecystitis.     - IR consult for percutaneous cholecystostomy tube placement  - HIDA scan this am  - NPO for procedure, mIVF@75  - IV Zosyn  - PTA Lasix and Spironolactone  - oxy PRN  - bowel reg     Seen, examined, and discussed with chief resident, who will discuss with staff.    Araceli Zapata MD  General Surgery PGY-1  Pager [phone]

## 2020-04-22 NOTE — CONSULTS
Interventional Radiology Consult Service Note    Patient is on IR schedule 4/22 for a Image guided placement of percutaneous cholecystostomy tube.   Labs WNL for procedure.  Orders for NPO have been entered.   Consent will be done prior to procedure.     Please contact the IR charge RN at [phone] for estimated time of procedure.     Case discussed with Dr. Diaz from IR and Dr. Zapata. This is a 53 yo female with alcoholic liver cirrhosis (meld score 12) presents with symptoms of acute on chronic cholecystitis. US from 4/21 with gallstones, mildly thickened gallbladder wall and pericholecystic fluid as well as positive delgado's sign. HIDA from 4/22 positive. Cholecystectomy deferred due to pt's underlying cirrhosis. IR was therefore consulted for cholecystostomy tube placement.    These drains generally must remain in x6 weeks at a minimum (if they take a transhepatic approach). Additionally, in the getting of calculus cholecystitis, unless the gallbladder is surgically removed the drain may need to remain in permanently.     Saima Paiz DNP, APRN  Interventional Radiology  Pager: [phone]

## 2020-04-22 NOTE — PLAN OF CARE
"Vital signs:  Temp: 97.7  F (36.5  C) Temp src: Oral BP: 109/71   Heart Rate: 71 Resp: 18 SpO2: 98 % O2 Device: None (Room air)   Height: 167.6 cm (5' 6\") Weight: 77.1 kg (170 lb)  Estimated body mass index is 27.44 kg/m  as calculated from the following:    Height as of this encounter: 1.676 m (5' 6\").    Weight as of this encounter: 77.1 kg (170 lb).    Neuro: Alert and oriented x4.     GI/: WDL.    Diet: NPO with MIV at 125cc per hr.    Incisions/Drains: None.    IV Access: PIV infusing.    Labs: Last lab results 4/21 (K=4.7, wbc=4.5, hgb=12.5, inr=1.32, PTT=41). No labs today.    Activity: OOB independently.    Pain: Oxycodone x1.     New changes this shift: NM this am for HIDA scan. Back at 1100.     Plan: Remain NPO for IR percutaneous cholecystostomy tube placement this afternoon.  "

## 2020-04-22 NOTE — PROGRESS NOTES
Interventional Radiology Note      Patient here for drain placement into the gallbladder due to suspicion of acute calculus cholecystitis.  HIDA scan was read as positive from the same day.  On the procedure table, ultrasound images were obtained, which revealed wall echo shadow triad consistent with gallbladder full of stones and contracted gallbladder.  No gall bladder distension to place a drain.  Ultrasound findings are not consistent with acute cholecystitis, the previous imaging findings on HIDA scan may be secondary to chronic cholecystitis.  No drain was placed.    Sedrick Vasquez   IR fellow   [phone]

## 2020-04-22 NOTE — PRE-PROCEDURE
GENERAL PRE-PROCEDURE:     Written consent obtained?: Yes    Risks and benefits: Risks, benefits and alternatives were discussed    Consent given by:  Patient  Patient states understanding of procedure being performed: Yes    Patient's understanding of procedure matches consent: Yes    Procedure consent matches procedure scheduled: Yes    Expected level of sedation:  Moderate  Appropriately NPO:  Yes  ASA Class:  Class 2- mild systemic disease, no acute problems, no functional limitations  Mallampati  :  Grade 2- soft palate, base of uvula, tonsillar pillars, and portion of posterior pharyngeal wall visible  Lungs:  Lungs clear with good breath sounds bilaterally  Heart:  Normal heart sounds and rate  History & Physical reviewed:  History and physical reviewed and no updates needed  Statement of review:  I have reviewed the lab findings, diagnostic data, medications, and the plan for sedation

## 2020-04-23 ENCOUNTER — ANESTHESIA EVENT (OUTPATIENT)
Dept: SURGERY | Facility: CLINIC | Age: 55
DRG: 445 | End: 2020-04-23
Payer: COMMERCIAL

## 2020-04-23 PROCEDURE — 25000132 ZZH RX MED GY IP 250 OP 250 PS 637: Performed by: STUDENT IN AN ORGANIZED HEALTH CARE EDUCATION/TRAINING PROGRAM

## 2020-04-23 PROCEDURE — 25800030 ZZH RX IP 258 OP 636: Performed by: STUDENT IN AN ORGANIZED HEALTH CARE EDUCATION/TRAINING PROGRAM

## 2020-04-23 PROCEDURE — 12000001 ZZH R&B MED SURG/OB UMMC

## 2020-04-23 PROCEDURE — 96366 THER/PROPH/DIAG IV INF ADDON: CPT

## 2020-04-23 PROCEDURE — 25000128 H RX IP 250 OP 636: Performed by: STUDENT IN AN ORGANIZED HEALTH CARE EDUCATION/TRAINING PROGRAM

## 2020-04-23 PROCEDURE — G0378 HOSPITAL OBSERVATION PER HR: HCPCS

## 2020-04-23 RX ADMIN — PIPERACILLIN AND TAZOBACTAM 3.38 G: 3; .375 INJECTION, POWDER, FOR SOLUTION INTRAVENOUS at 13:30

## 2020-04-23 RX ADMIN — PIPERACILLIN AND TAZOBACTAM 3.38 G: 3; .375 INJECTION, POWDER, FOR SOLUTION INTRAVENOUS at 01:31

## 2020-04-23 RX ADMIN — FUROSEMIDE 40 MG: 40 TABLET ORAL at 08:02

## 2020-04-23 RX ADMIN — PIPERACILLIN AND TAZOBACTAM 3.38 G: 3; .375 INJECTION, POWDER, FOR SOLUTION INTRAVENOUS at 08:02

## 2020-04-23 RX ADMIN — PIPERACILLIN AND TAZOBACTAM 3.38 G: 3; .375 INJECTION, POWDER, FOR SOLUTION INTRAVENOUS at 20:16

## 2020-04-23 RX ADMIN — SPIRONOLACTONE 100 MG: 50 TABLET, FILM COATED ORAL at 08:02

## 2020-04-23 RX ADMIN — POLYETHYLENE GLYCOL 3350 17 G: 17 POWDER, FOR SOLUTION ORAL at 15:58

## 2020-04-23 RX ADMIN — OXYCODONE HYDROCHLORIDE 10 MG: 5 TABLET ORAL at 01:29

## 2020-04-23 RX ADMIN — OXYCODONE HYDROCHLORIDE 5 MG: 5 TABLET ORAL at 05:40

## 2020-04-23 RX ADMIN — SENNOSIDES AND DOCUSATE SODIUM 2 TABLET: 8.6; 5 TABLET ORAL at 08:02

## 2020-04-23 RX ADMIN — POTASSIUM CHLORIDE, DEXTROSE MONOHYDRATE AND SODIUM CHLORIDE: 150; 5; 450 INJECTION, SOLUTION INTRAVENOUS at 06:30

## 2020-04-23 RX ADMIN — OXYCODONE HYDROCHLORIDE 10 MG: 5 TABLET ORAL at 23:59

## 2020-04-23 RX ADMIN — Medication 375 MG: at 08:02

## 2020-04-23 RX ADMIN — POTASSIUM CHLORIDE, DEXTROSE MONOHYDRATE AND SODIUM CHLORIDE: 150; 5; 450 INJECTION, SOLUTION INTRAVENOUS at 22:15

## 2020-04-23 ASSESSMENT — ACTIVITIES OF DAILY LIVING (ADL)
ADLS_ACUITY_SCORE: 11

## 2020-04-23 ASSESSMENT — MIFFLIN-ST. JEOR: SCORE: 1357.75

## 2020-04-23 NOTE — PLAN OF CARE
"Vital signs:  Temp: 97.8  F (36.6  C) Temp src: Oral BP: 112/67 Pulse: 76 Heart Rate: 72 Resp: 18 SpO2: 97 % O2 Device: None (Room air)   Height: 167.6 cm (5' 6\") Weight: 77.1 kg (170 lb)  Estimated body mass index is 27.44 kg/m  as calculated from the following:    Height as of this encounter: 1.676 m (5' 6\").    Weight as of this encounter: 77.1 kg (170 lb).  AVSS. No c/o pain. No c/o nausea.    Neuro: Alert and oriented x4.     GI/: WDL.    Diet: Was NPO this am. Now on regular diet and NPO after MN.    Incisions/Drains: None.    IV Access: PIV infusing.    Activity: Independent.     Pain: No c/o pain.    New changes this shift: Regular diet now and NPO after MN.    Plan: Possible ERCP and cyst duct stent placement tomorrow. INR in am. Pt aware of plans. Continue with current POC.  "

## 2020-04-23 NOTE — PLAN OF CARE
"/67 (BP Location: Right arm)   Pulse 73   Temp 97.9  F (36.6  C) (Oral)   Resp 18   Ht 1.676 m (5' 6\")   Wt 77.1 kg (170 lb)   LMP 05/31/2006   SpO2 99%   BMI 27.44 kg/m      Reason for admission: Cholecystitis  Neuro: A&Ox4, able to make needs known  Cardiac: soft bps, no chest pain  Respiratory: WDL, no SOB  GI/: voiding not saving, LBM: 4/20 per pt report  Skin: WDL  Pain: managed with Oxycodone x2  Lines: R PIV- D5 1/2 NS + KCl 20 @ 75/hr   Drains: X  Incisions: X  Activity: independent  Diet: NPO at midnight (for potential procedure)  Labs: INR-1.32, Platelet-88  Changes/Plan: GI consult in the am-will be coming up with a plan, continue POC    "

## 2020-04-23 NOTE — PLAN OF CARE
Activity: Independent  Neuros: A&O x 4, neuros intact. Denies numbness and tingling. Expressing frustrations with current situation..  Cardiac: WDL, denies pain.  Respiratory: WDL, stable on RA. Denies SOB.  GI: WDL, ex pt reports last stool on 4/20. +BS, +flatus.  : Voiding, not saving.  Diet: Regular, poor appetite. NPO at midnight.  Skin/Incisions: Warm, dry, intact. No new areas of concern.  Lines/Drains: R PIV SL, infusing zosyn q6.  Labs: Reviewed.  Pain/nausea:  Denies pain and nausea after returning from IR.  New changes this shift:  Pt had planned for drain placement this afternoon, but was cancelled. See IR note. GI panc bili consult.  Plan: NPO for possible procedure tomorrow. Continue to monitor and follow POC.

## 2020-04-23 NOTE — PROGRESS NOTES
Surgery Progress Note  04/23/2020       Subjective:  NAEO. Pain has improved but not resolved. Poor appetite but able to tolerate regular diet yesterday evening without nausea. +gas, no bowel movement for several days.     Objective:  Temp:  [97.7  F (36.5  C)-98.1  F (36.7  C)] 97.8  F (36.6  C)  Pulse:  [68-82] 76  Heart Rate:  [70-77] 72  Resp:  [18] 18  BP: (102-126)/(67-77) 112/67  SpO2:  [95 %-100 %] 97 %    I/O last 3 completed shifts:  In: 1510 [P.O.:370; I.V.:1140]  Out: -       Gen: Awake, alert, NAD, comfortable appearing in bed  CV: non-cyanotic  Resp: NLB on RA  Abd: soft, nondistended, tender over RUQ     Labs:  Recent Labs   Lab 04/21/20  1830 04/21/20  0226   WBC 4.5 4.6   HGB 12.5 14.1   PLT 88* 98*       Recent Labs   Lab 04/21/20  1735 04/21/20  0226    137   POTASSIUM 4.7 3.3*   CHLORIDE 109 105   CO2 26 23   BUN 10 13   CR 0.66 0.68   GLC 96 125*   ARIEL 8.9 9.4         Assessment/Plan:   53 yo female with alcoholic liver cirrhosis (meld score 12) presents with symptoms of acute on chronic cholecystitis. HIDA 4/22 showed on filling of the gallbladder. Unable to get PCT yesterday due to contracted gallbladder.    - GI consult for possible cystic duct stent  - NPO for procedure, mIVF@75  - IV Zosyn  - PTA Lasix and Spironolactone  - oxy PRN  - bowel reg with Senna and PRN Miralax     Seen, examined, and discussed with chief resident, who will discuss with staff.    Araceli Zapata MD  General Surgery PGY-1  Pager [phone]

## 2020-04-23 NOTE — CONSULTS
GASTROENTEROLOGY CONSULTATION      Date of Admission:  4/21/2020          ASSESSMENT AND RECOMMENDATIONS:   Assessment:  Abiola Matute is a 54 year old woman with alcoholic cirrhosis (MELD-12), Crohn's disease (Remicade q 8weeks, last dose March 18) who presented to [facility] and suspected to have chronic intermittent cholecystitis based on HIDA and ultrasound (prior cholecystitis episode in Oct 2019 managed on IV Abx). She was transferred to [facility] for percutaneous cholecystostomy by IR, but a drain was not placed on 4/22 as the gallbladder had contracted and wasn't distended on bedside ultrasound.    #Chronic Intermittent Cholecystitis   First diagnosed in October 22, 2019 on HIDA scan and conservatively managed with IV antibiotics as she was felt to be a poor surgical candidate given her cirrhosis and immunosuppression on remicade. She has been on ursodiol 375 mg BID since 04/07 without significant relief. Ultrasound on 4/21 showed gallbladder wall thickening (4 mm) and a CBD distended to 8 mm. Because these findings can occur in portal hypertension, a HIDA scan was performed and the gallbladder did not fill. She was transferred to [facility] for IR to place a drain on 4/22 gallbladder was contracted and drain could not be placed.   Given her limited therapeutic options, we discussed with the patient that an ERCP with a cystic duct stent would be empiric therapy for her cholecystitis to help drain the gallbladder and it does not garauntee relief of her symptoms. We briefly discussed the risks of bile leak, bleeding, and ERCP pancreatitis, and the patient still indicated that she was interested in the aforementioned procedure.    #Decompensated Alcoholic + DUNN Cirrosis    MELD-Na: 12. Follows with Dr. Cervantes. Prior history of ascites in October 2019. Currently well-controlled on spironolactone 100 mg and lasix 40 mg PO daily. No varices on EGD in 12/05/2019    #Chron's Disease   Followed by   Shakir. On infliximab q 8 weeks (last dose March 18; next dose due week of May 14th) Denies flares, hematochezia, weightloss, fevers. She reports her pain is different from her typical Crohn's disease.    Recommendations:  -We will plan for ERCP with transpapillary/cystic duct stent placement tomorrow as an add-on (added on to schedule for you).  - Please have the patient be NPO after midnight (done for you).  - Please check an INR in the AM (ordered for you).     Gastroenterology team will continue to follow with you.    Thank you for involving us in this patient's care. Please do not hesitate to contact the GI service with any questions or concerns.     Pt care plan discussed with GI staff physician, Dr. Mendez Kumar MD.    Tashi Mares MD   Gastroenterology  [phone]    ------------------------------------------------------------------------------------------------------------------          Chief Complaint:   We were asked by Dr. Yeimi Zapata MD of surgery to evaluate this patient with acute on chronic cholecystitis for evaluation of placement of a cystic duct stent    History is obtained from the patient and the medical record.          History of Present Illness:     Abiola Matute is a 53 yo woman with ETOH/DUNN cirrhosis, Crohn's disease who was transferred from [place] for management of acute on chronic cholecystitis. She reports that for the past 21 days, she's had 15 episodes of epigastric/RUQ cramping that radiates to the flanks/back, and is worse with food. She states this pain is different from her prior Crohn's symptoms. She presented to the [facility] ED on Monday when she had a one time episode of billious vomiting and increasing pain severity.    At the [facility] ED, she had an ultrasound, and a HIDA scan consitent with cholecystitis. She had been previously diagnosed with cholecystitis at [place] and transferred to the [facility] in October 2019, but was conservatively managed with IV  antibiotics at that time because she was not considered a surgical candidate. IR attempted to perform a percutaneous cholecystostomy on 4/21 but the gallbladder was non-distended. She has been on ursodiol 375 mg BID since 04/07 and reports only mild relief.    She denies fevers, dysphagia, odynophagia, chest pain, SOB, changes in bowel habits, changes in urinary habits, bleeding, changes in weight, leg swelling.    Prior Abdominal Surgeries: open appendectomy (in the 9th grade), laparoscopic hysterectomy (15 years ago)    Prior NSAIDs or Anticoagulation: no chronic anticoagulation, denies NSAIDs, denies tylenol    GI Family History: denies family history of IBD, liver disease, esophageal, gastric, pancreatic, or colon cancer    Pertinent GI Medications: ursodiol, oxycodone, lasix, spironolactone,  morphine              Past Medical History:   Reviewed and edited as appropriate  Past Medical History:   Diagnosis Date     Alcohol abuse      Alcoholic cirrhosis of liver with ascites      Atypical ductal hyperplasia of breast 9/10/10    ERT not recommended -left - and flat epithelial atypia-scheduled for breast biopsy 9/17/2010      Bifid uvula      Cholelithiasis      Contact perianal dermatitis and other eczema     recurrent - clobetasol      Crohn disease      Fear of flying      - gets Ativan prn.      Hypertension      IBS (irritable bowel syndrome)             Past Surgical History:   Reviewed and edited as appropriate   Past Surgical History:   Procedure Laterality Date     BIOPSY ANAL N/A 3/28/2019    anal biopsy and culure placement of seton - Dr Fleming     BIOPSY BREAST Left 09/17/2010    - scheduled with Dr. Varma      BIOPSY LIVER  2019     C APPENDECTOMY  at age 14     COLONOSCOPY  2006     COLONOSCOPY N/A 12/23/2014    Procedure: COMBINED COLONOSCOPY, SINGLE OR MULTIPLE BIOPSY/POLYPECTOMY BY BIOPSY;  Surgeon: Diane Fleming MD;  Location:  GI     COLONOSCOPY N/A 12/5/2019    Procedure:  COLONOSCOPY, WITH POLYPECTOMY AND BIOPSY;  Surgeon: Farhan Schilling MD;  Location: [facility]     ESOPHAGOSCOPY, GASTROSCOPY, DUODENOSCOPY (EGD), COMBINED N/A 12/5/2019    Procedure: ESOPHAGOGASTRODUODENOSCOPY (EGD);  Surgeon: Farhan Schilling MD;  Location:  GI     EXAM UNDER ANESTHESIA ANUS N/A 3/28/2019    Procedure: EXAM UNDER ANESTHESIA ANUS;  Surgeon: Diane Fleming MD;  Location: [facility] OR     HYSTERECTOMY, VAGINAL  2006    with Dr. Licha Zhou - with BSO for fibroids      OPEN REDUCTION INTERNAL FIXATION ANKLE Left at age 27    plates and screws removed at age 36     Pelviscopy with removal of bilateral hydrosalpinges.  04/15/2010            Previous Endoscopy:     Results for orders placed or performed during the hospital encounter of 12/05/19   COLONOSCOPY   Result Value Ref Range    COLONOSCOPY       [address] [place] [address] [phone]     Endoscopy Department  _______________________________________________________________________________  Patient Name: Abiola Matute          Procedure Date: 12/5/2019 7:43 AM  MRN: [id number]                       Account Number: [id number]  YOB: 1965               Admit Type: Outpatient  Age: 54                               Room[facility]  Gender: Female                        Note Status: Finalized  Attending MD: Farhan Schilling ,    Total Sedation Time:   _______________________________________________________________________________     Procedure:           Colonoscopy  Indications:         Crohn's disease  Providers:           Farhan Schilling, Paulette Berger RN, Lisa Messina  Patient Profile:     53 y/o female with PMHx of perianal crohn's disease on                        infliximab and decompensated cirrhosis due to alcohol                         use complicated by ascites who presents for evaluation                        of extent of crohn's  disease.  Referring MD:        Fabio Ramos MD  Medicines:           Monitored Anesthesia Care  Complications:       No immediate complications. Estimated blood loss:                        Minimal.  _______________________________________________________________________________  Procedure:           Pre-Anesthesia Assessment:                       - Prior to the procedure, a History and Physical was                        performed, and patient medications and allergies were                        reviewed. The patient is competent. The risks and                        benefits of the procedure and the sedation options and                        risks were discussed with the patient. All questions                        were answered and informed consent was obtained. Patient                        identification and proposed procedure were verified by                         the physician, the nurse and the technician in the                        pre-procedure area in the procedure room in the                        endoscopy suite. Mental Status Examination: normal.                        Airway Examination: normal oropharyngeal airway and neck                        mobility. Respiratory Examination: clear to                        auscultation. CV Examination: normal. Prophylactic                        Antibiotics: The patient does not require prophylactic                        antibiotics. Prior Anticoagulants: The patient has taken                        no previous anticoagulant or antiplatelet agents. ASA                        Grade Assessment: III - A patient with severe systemic                        disease. After reviewing the risks and benefits, the                        patient was deemed in satisfactory condition to undergo                        the procedure. The anesthesia plan was to use monitored                        anesthesia c are (MAC). Immediately prior to                         administration of medications, the patient was                        re-assessed for adequacy to receive sedatives. The heart                        rate, respiratory rate, oxygen saturations, blood                        pressure, adequacy of pulmonary ventilation, and                        response to care were monitored throughout the                        procedure. The physical status of the patient was                        re-assessed after the procedure.                       After obtaining informed consent, the colonoscope was                        passed under direct vision. Throughout the procedure,                        the patient's blood pressure, pulse, and oxygen                        saturations were monitored continuously. The Colonoscope                        was introduced through the anus and advanced to the                        terminal ileum. The colonoscopy was performed without                         difficulty. The patient tolerated the procedure well.                        The quality of the bowel preparation was evaluated using                        the BBPS ([place] Bowel Preparation Scale) with scores                        of: Right Colon = 3, Transverse Colon = 3 and Left Colon                        = 3 (entire mucosa seen well with no residual staining,                        small fragments of stool or opaque liquid). The total                        BBPS score equals 9.                                                                                   Findings:       Hemorrhoids were found on perianal exam. No abscess, no palpable mass,        and no drainage. Seton in place.       The colon (entire examined portion) appeared normal, except one small        aphthous ulcer noted on the left side and AVM on right side,        non-bleeding. Biopsies were taken with a cold forceps for histology in        the left colon, right colon, and rectum in separate jars.       Krishan aguilar  terminal ileum appeared normal.                                                                                   Moderate Sedation:       Moderate (conscious) sedation was personally administered by an        anesthesia professional. The following parameters were monitored: oxygen        saturation, heart rate, blood pressure, and response to care. Total        physician intraservice time was 33 minutes.  Impression:          55 y/o female with PMHx of perianal crohn's disease on                        infliximab and decompensated cirrhosis due to alcohol                        use complicated by ascites who presents for evaluation                        of extent of crohn's disease. Appears to have isolated                        perianal disease based on endoscopic evaluation.                       - Hemorrhoids found on perianal exam. No abscess, no                        palpable mass, and no drainage. Seton in place.                       - The entire examined colon is normal,  except one small                        aphthous ulcer noted on the left side and a non-bleeding                        AVM. Biopsied.                       - The examined portion of the ileum was normal.  Recommendation:      - Discharge patient to home.                       - Await pathology results.                       - Continue infliximab                       - Follow up in clinic with Dr. Schilling                                                                                     Electronically signed by: Farhan Schilling MD  _____________________  Farhan Schilling,   12/5/2019 11:21:23 AM  I was physically present for the entire viewing portion of the exam.  __________________________  Signature of teaching physician  [id number] Schilling    _____________________________  Lisa Messina,   Number of Addenda: 0    Note Initiated On: 12/5/2019 7:43 AM  Scope In:  Scope Out:              Social History:   Reviewed and  edited as appropriate  Social History     Socioeconomic History     Marital status:      Spouse name: Albino     Number of children: 3     Years of education: 14     Highest education level: Not on file   Occupational History     Occupation: [employer]     Comment:    Social Needs     Financial resource strain: Not on file     Food insecurity     Worry: Not on file     Inability: Not on file     Transportation needs     Medical: Not on file     Non-medical: Not on file   Tobacco Use     Smoking status: Never Smoker     Smokeless tobacco: Never Used   Substance and Sexual Activity     Alcohol use: Yes     Alcohol/week: 0.0 standard drinks     Comment: occasional     Drug use: No     Comment: no herbal meds either     Sexual activity: Yes     Partners: Male     Birth control/protection: Surgical     Comment: harper had vasectomy   Lifestyle     Physical activity     Days per week: Not on file     Minutes per session: Not on file     Stress: Not on file   Relationships     Social connections     Talks on phone: Not on file     Gets together: Not on file     Attends Rastafari service: Not on file     Active member of club or organization: Not on file     Attends meetings of clubs or organizations: Not on file     Relationship status: Not on file     Intimate partner violence     Fear of current or ex partner: Not on file     Emotionally abused: Not on file     Physically abused: Not on file     Forced sexual activity: Not on file   Other Topics Concern      Service No     Blood Transfusions No     Caffeine Concern No     Comment: rarely drinks caffeine     Occupational Exposure Not Asked     Hobby Hazards Not Asked     Sleep Concern Not Asked     Stress Concern Not Asked     Weight Concern Not Asked     Special Diet Not Asked     Back Care Not Asked     Exercise Yes     Comment: does a lot of walking - 4x/week     Bike Helmet Not Asked     Seat Belt Yes     Comment: always      Self-Exams Yes     Comment: SBE encouraged monthly     Parent/sibling w/ CABG, MI or angioplasty before 65F 55M? Yes   Social History Narrative    calcium - drinks 5-6 large glasses skim milk/day    flex sig/colonoscopy -at age 49    sun precautions - discussed    mammogram - needs every 2 years in her 30's, then yearly from then on    Td booster - 9/99 and 4/27/2010    pneumovax -at age 59    DEXA -when perimenopausal    stool hemoccults - every year after age 40    ASA- start at age 40    mulvitamin - encouraged            Family History:   Reviewed and edited as appropriate  Family History   Problem Relation Age of Onset     Breast Cancer Mother      Gastrointestinal Disease Mother      Heart Disease Father 57     Heart Disease Paternal Grandfather      Hypertension Maternal Grandmother      Diabetes Paternal Grandmother      Diabetes Maternal Grandfather      Cancer Sister              Allergies:   Reviewed and edited as appropriate     Allergies   Allergen Reactions     Fish Oil      Redness and itching around eye area only - went away when fish oil capsules stopped      Metronidazole      pain/itching     Naphthalenemethylamines      Lamisil = mild urticarial reaction     Penicillins Cramps     Vomiting     Ppd [Tuberculin Purified Protein Derivative]      Sulfa Drugs      hives            Medications:     Current Facility-Administered Medications   Medication     dextrose 5% and 0.45% NaCl + KCl 20 mEq/L infusion     furosemide (LASIX) tablet 40 mg     iodixanol (VISIPAQUE 320) injection 50 mL     lidocaine (LMX4) cream     lidocaine 1 % 0.1-1 mL     melatonin tablet 1 mg     naloxone (NARCAN) injection 0.1-0.4 mg     ondansetron (ZOFRAN-ODT) ODT tab 4 mg    Or     ondansetron (ZOFRAN) injection 4 mg     oxyCODONE (ROXICODONE) tablet 5-10 mg     piperacillin-tazobactam (ZOSYN) 3.375 g vial to attach to  mL bag     polyethylene glycol (MIRALAX) Packet 17 g     senna-docusate (SENOKOT-S/PERICOLACE) 8.6-50  "MG per tablet 1 tablet    Or     senna-docusate (SENOKOT-S/PERICOLACE) 8.6-50 MG per tablet 2 tablet     sodium chloride (PF) 0.9% PF flush 3 mL     sodium chloride (PF) 0.9% PF flush 3 mL     spironolactone (ALDACTONE) tablet 100 mg     ursodiol (ACTIGALL) half-tab 375 mg             Review of Systems:     A complete review of systems was performed and is negative except as noted in the HPI           Physical Exam:   /67 (BP Location: Right arm)   Pulse 76   Temp 97.8  F (36.6  C) (Oral)   Resp 18   Ht 1.676 m (5' 6\")   Wt 77.1 kg (170 lb)   LMP 05/31/2006   SpO2 97%   BMI 27.44 kg/m    Wt:   Wt Readings from Last 2 Encounters:   04/21/20 77.1 kg (170 lb)   03/18/20 76 kg (167 lb 9.6 oz)      Constitutional: pleasant, NAD  Eyes: Sclera anicteric, EOMI  Ears/nose/mouth/throat: MMM, no oral ulcers, good dentition  CV: S1 S2 RRR no m/g/r  Respiratory: Unlabored breathing on room air, CTAB  Abd: nondistended, hyperactive bs, soft, RUQ tenderness + Gil's, no gaurding or rebound tenderness.  Extremities: radial and pedal pulses present bilaterally, no peripheral edema  Skin: mild dermatovenostasis, no jaundice  Neuro: AAO x 3, No asterixis  Psych: appropriate mood and affect         Data:   Labs and imaging below were independently reviewed and interpreted    BMP  Recent Labs   Lab 04/21/20  1735 04/21/20  0226    137   POTASSIUM 4.7 3.3*   CHLORIDE 109 105   ARIEL 8.9 9.4   CO2 26 23   BUN 10 13   CR 0.66 0.68   GLC 96 125*     CBC  Recent Labs   Lab 04/21/20  1830 04/21/20  0226   WBC 4.5 4.6   RBC 3.62* 4.14   HGB 12.5 14.1   HCT 38.1 41.5   * 100   MCH 34.5* 34.1*   MCHC 32.8 34.0   RDW 15.2* 14.6   PLT 88* 98*     INR  Recent Labs   Lab 04/21/20  1735   INR 1.32*     LFTs  Recent Labs   Lab 04/21/20  1735 04/21/20 0226   ALKPHOS 128 169*   AST 50* 57*   ALT 34 38   BILITOTAL 1.8* 2.0*   PROTTOTAL 7.7 8.8   ALBUMIN 3.0* 3.4      PANC  Recent Labs   Lab 04/21/20  1830 04/21/20 0226 "   LIPASE 174 284       Imaging:    DATE/TIME: 4/21/2020 3:10 AM  INDICATION: Right upper quadrant pain.  COMPARISON: 2/29/2019 - CT abdomen and pelvis.     TECHNIQUE: Limited abdominal ultrasound.     FINDINGS:  GALLBLADDER: A few gallstones are present in a nondistended gallbladder. Mild gallbladder wall thickening, measuring 0.4 cm in thickness. A trace amount of pericholecystic fluid. The ultrasound technologist reports that the patient has focal tenderness   over the gallbladder.     BILE DUCTS: No intrahepatic biliary dilatation. The common duct is mildly dilated, measuring 0.8 cm in diameter.     LIVER: Mild diffuse increased echogenicity, suggestive of fatty infiltration. No hepatic masses.     OTHER: None.                                                                   IMPRESSION:  1. A few gallstones are present within a mildly thick-walled gallbladder. Additionally, there is a trace amount of pericholecystic fluid and the ultrasound technologist reports that the patient has focal tenderness over the gallbladder. These findings   are suggestive of, but not diagnostic of, acute cholecystitis. Recommend clinical correlation. If clinically relevant, a HIDA scan could be considered for further evaluation.  2. Mild dilatation of the common duct, which measures 0.8 cm in diameter. No intrahepatic biliary dilatation.  3. Mild diffuse fatty infiltration of the liver.    HIDA scan 04/21/2020    Findings:     There is prompt clearance of the radionuclide from the blood pool into  the liver. By 10 minutes there is clear visualization of the  intrahepatic ducts as well as the upper common bile duct. By 15  minutes there is  emptying from the common bile duct into the small  bowel without gallbladder visualization.     After morphine administration IV at 60 minutes the gallbladder could  not be recognized.     Enterogastric reflux was not present.     04/21/2020    EXAM: IR GALLBLADDER DRAIN PLACEMENT  4/22/2020 3:45  PM       HISTORY: perc cholecystostomy tube placement     COMPARISON: HIDA scan same day     FINDINGS:  Patient here for drain placement into the gallbladder due  to suspicion of acute calculus cholecystitis. ?HIDA scan was read as  positive from the same day. ?On the procedure table, ultrasound images  were obtained, which revealed wall echo shadow triad?consistent with  gallbladder full of stones and contracted gallbladder. ?No gall  bladder distension?to place a drain. ?Ultrasound findings are not  consistent with acute cholecystitis, the previous imaging findings on  HIDA scan may be secondary to chronic cholecystitis. ?No drain was  placed.                                             IMPRESSION: No drain was placed, as the gallbladder was not distended.  Same day HIDA scan findings may be secondary to contracted  gallbladder/chronic cholecystitis.                                                                   Impression:     Hepatobiliary scan consistent with acute cholecystitis.     =======================     The normal Gall Bladder ejection fraction for a 45 minute infusion is  >40%rr        [Access Center: Acute cholecystitis]     This report will be copied to the [facility] to ensure a  provider acknowledges the finding. Access Center is available Monday  through Friday 8am-3:30 pm.      I have personally reviewed the examination and initial interpretation  and I agree with the findings.     MANDO HUFF MD

## 2020-04-24 ENCOUNTER — ANESTHESIA (OUTPATIENT)
Dept: SURGERY | Facility: CLINIC | Age: 55
DRG: 445 | End: 2020-04-24
Payer: COMMERCIAL

## 2020-04-24 ENCOUNTER — APPOINTMENT (OUTPATIENT)
Dept: GENERAL RADIOLOGY | Facility: CLINIC | Age: 55
DRG: 445 | End: 2020-04-24
Attending: INTERNAL MEDICINE
Payer: COMMERCIAL

## 2020-04-24 LAB
ALBUMIN SERPL-MCNC: 2.6 G/DL (ref 3.4–5)
ALP SERPL-CCNC: 118 U/L (ref 40–150)
ALT SERPL W P-5'-P-CCNC: 32 U/L (ref 0–50)
AMYLASE SERPL-CCNC: 79 U/L (ref 30–110)
ANION GAP SERPL CALCULATED.3IONS-SCNC: 6 MMOL/L (ref 3–14)
AST SERPL W P-5'-P-CCNC: 52 U/L (ref 0–45)
BILIRUB SERPL-MCNC: 2.1 MG/DL (ref 0.2–1.3)
BUN SERPL-MCNC: 9 MG/DL (ref 7–30)
CALCIUM SERPL-MCNC: 8.5 MG/DL (ref 8.5–10.1)
CHLORIDE SERPL-SCNC: 104 MMOL/L (ref 94–109)
CO2 SERPL-SCNC: 25 MMOL/L (ref 20–32)
CREAT SERPL-MCNC: 0.78 MG/DL (ref 0.52–1.04)
ERCP: NORMAL
ERYTHROCYTE [DISTWIDTH] IN BLOOD BY AUTOMATED COUNT: 14.6 % (ref 10–15)
GFR SERPL CREATININE-BSD FRML MDRD: 85 ML/MIN/{1.73_M2}
GLUCOSE BLDC GLUCOMTR-MCNC: 150 MG/DL (ref 70–99)
GLUCOSE BLDC GLUCOMTR-MCNC: 78 MG/DL (ref 70–99)
GLUCOSE SERPL-MCNC: 119 MG/DL (ref 70–99)
HCT VFR BLD AUTO: 40 % (ref 35–47)
HGB BLD-MCNC: 13.4 G/DL (ref 11.7–15.7)
INR PPP: 1.33 (ref 0.86–1.14)
LIPASE SERPL-CCNC: 216 U/L (ref 73–393)
MCH RBC QN AUTO: 34.2 PG (ref 26.5–33)
MCHC RBC AUTO-ENTMCNC: 33.5 G/DL (ref 31.5–36.5)
MCV RBC AUTO: 102 FL (ref 78–100)
PLATELET # BLD AUTO: 82 10E9/L (ref 150–450)
POTASSIUM SERPL-SCNC: 4.2 MMOL/L (ref 3.4–5.3)
PROT SERPL-MCNC: 7.2 G/DL (ref 6.8–8.8)
RBC # BLD AUTO: 3.92 10E12/L (ref 3.8–5.2)
SODIUM SERPL-SCNC: 135 MMOL/L (ref 133–144)
WBC # BLD AUTO: 3.5 10E9/L (ref 4–11)

## 2020-04-24 PROCEDURE — 25000566 ZZH SEVOFLURANE, EA 15 MIN: Performed by: INTERNAL MEDICINE

## 2020-04-24 PROCEDURE — 25500064 ZZH RX 255 OP 636: Performed by: INTERNAL MEDICINE

## 2020-04-24 PROCEDURE — 25000128 H RX IP 250 OP 636: Performed by: STUDENT IN AN ORGANIZED HEALTH CARE EDUCATION/TRAINING PROGRAM

## 2020-04-24 PROCEDURE — 83690 ASSAY OF LIPASE: CPT | Performed by: STUDENT IN AN ORGANIZED HEALTH CARE EDUCATION/TRAINING PROGRAM

## 2020-04-24 PROCEDURE — 82150 ASSAY OF AMYLASE: CPT | Performed by: STUDENT IN AN ORGANIZED HEALTH CARE EDUCATION/TRAINING PROGRAM

## 2020-04-24 PROCEDURE — 25000132 ZZH RX MED GY IP 250 OP 250 PS 637: Performed by: STUDENT IN AN ORGANIZED HEALTH CARE EDUCATION/TRAINING PROGRAM

## 2020-04-24 PROCEDURE — 25000128 H RX IP 250 OP 636: Performed by: ANESTHESIOLOGY

## 2020-04-24 PROCEDURE — G0378 HOSPITAL OBSERVATION PER HR: HCPCS

## 2020-04-24 PROCEDURE — 40000279 XR SURGERY CARM FLUORO GREATER THAN 5 MIN W STILLS: Mod: TC

## 2020-04-24 PROCEDURE — 96366 THER/PROPH/DIAG IV INF ADDON: CPT

## 2020-04-24 PROCEDURE — 00000146 ZZHCL STATISTIC GLUCOSE BY METER IP

## 2020-04-24 PROCEDURE — 37000008 ZZH ANESTHESIA TECHNICAL FEE, 1ST 30 MIN: Performed by: INTERNAL MEDICINE

## 2020-04-24 PROCEDURE — 36000061 ZZH SURGERY LEVEL 3 W FLUORO 1ST 30 MIN - UMMC: Performed by: INTERNAL MEDICINE

## 2020-04-24 PROCEDURE — 12000001 ZZH R&B MED SURG/OB UMMC

## 2020-04-24 PROCEDURE — 36000059 ZZH SURGERY LEVEL 3 EA 15 ADDTL MIN UMMC: Performed by: INTERNAL MEDICINE

## 2020-04-24 PROCEDURE — 25000125 ZZHC RX 250: Performed by: NURSE ANESTHETIST, CERTIFIED REGISTERED

## 2020-04-24 PROCEDURE — 85027 COMPLETE CBC AUTOMATED: CPT | Performed by: STUDENT IN AN ORGANIZED HEALTH CARE EDUCATION/TRAINING PROGRAM

## 2020-04-24 PROCEDURE — 25800030 ZZH RX IP 258 OP 636: Performed by: STUDENT IN AN ORGANIZED HEALTH CARE EDUCATION/TRAINING PROGRAM

## 2020-04-24 PROCEDURE — 37000009 ZZH ANESTHESIA TECHNICAL FEE, EACH ADDTL 15 MIN: Performed by: INTERNAL MEDICINE

## 2020-04-24 PROCEDURE — 40000170 ZZH STATISTIC PRE-PROCEDURE ASSESSMENT II: Performed by: INTERNAL MEDICINE

## 2020-04-24 PROCEDURE — 71000014 ZZH RECOVERY PHASE 1 LEVEL 2 FIRST HR: Performed by: INTERNAL MEDICINE

## 2020-04-24 PROCEDURE — 36415 COLL VENOUS BLD VENIPUNCTURE: CPT | Performed by: STUDENT IN AN ORGANIZED HEALTH CARE EDUCATION/TRAINING PROGRAM

## 2020-04-24 PROCEDURE — 25800030 ZZH RX IP 258 OP 636: Performed by: ANESTHESIOLOGY

## 2020-04-24 PROCEDURE — C1769 GUIDE WIRE: HCPCS | Performed by: INTERNAL MEDICINE

## 2020-04-24 PROCEDURE — 25000125 ZZHC RX 250: Performed by: INTERNAL MEDICINE

## 2020-04-24 PROCEDURE — 25000128 H RX IP 250 OP 636: Performed by: NURSE ANESTHETIST, CERTIFIED REGISTERED

## 2020-04-24 PROCEDURE — 25800030 ZZH RX IP 258 OP 636: Performed by: NURSE ANESTHETIST, CERTIFIED REGISTERED

## 2020-04-24 PROCEDURE — 85610 PROTHROMBIN TIME: CPT | Performed by: STUDENT IN AN ORGANIZED HEALTH CARE EDUCATION/TRAINING PROGRAM

## 2020-04-24 PROCEDURE — 80053 COMPREHEN METABOLIC PANEL: CPT | Performed by: STUDENT IN AN ORGANIZED HEALTH CARE EDUCATION/TRAINING PROGRAM

## 2020-04-24 PROCEDURE — 27210794 ZZH OR GENERAL SUPPLY STERILE: Performed by: INTERNAL MEDICINE

## 2020-04-24 PROCEDURE — C1726 CATH, BAL DIL, NON-VASCULAR: HCPCS | Performed by: INTERNAL MEDICINE

## 2020-04-24 PROCEDURE — C1877 STENT, NON-COAT/COV W/O DEL: HCPCS | Performed by: INTERNAL MEDICINE

## 2020-04-24 DEVICE — STENT FREEMAN PANCREA FLEX 4FRX11CM W/O FLANGE SGL PIGTAIL: Type: IMPLANTABLE DEVICE | Site: PANCREATIC DUCT | Status: FUNCTIONAL

## 2020-04-24 DEVICE — STENT ZIMMON BILIARY 07FRX15CM DBL PIGTAIL: Type: IMPLANTABLE DEVICE | Site: BILE DUCT | Status: FUNCTIONAL

## 2020-04-24 RX ORDER — ONDANSETRON 2 MG/ML
4 INJECTION INTRAMUSCULAR; INTRAVENOUS EVERY 30 MIN PRN
Status: DISCONTINUED | OUTPATIENT
Start: 2020-04-24 | End: 2020-04-24 | Stop reason: HOSPADM

## 2020-04-24 RX ORDER — LIDOCAINE 40 MG/G
CREAM TOPICAL
Status: DISCONTINUED | OUTPATIENT
Start: 2020-04-24 | End: 2020-04-24

## 2020-04-24 RX ORDER — ACETAMINOPHEN 325 MG/1
325-650 TABLET ORAL EVERY 6 HOURS PRN
Qty: 100 TABLET | Refills: 0 | Status: SHIPPED | OUTPATIENT
Start: 2020-04-24 | End: 2022-11-11

## 2020-04-24 RX ORDER — AMOXICILLIN 250 MG
1 CAPSULE ORAL 2 TIMES DAILY PRN
Qty: 20 TABLET | Refills: 0 | Status: SHIPPED | OUTPATIENT
Start: 2020-04-24 | End: 2020-09-04

## 2020-04-24 RX ORDER — ONDANSETRON 4 MG/1
4 TABLET, ORALLY DISINTEGRATING ORAL EVERY 30 MIN PRN
Status: DISCONTINUED | OUTPATIENT
Start: 2020-04-24 | End: 2020-04-24 | Stop reason: HOSPADM

## 2020-04-24 RX ORDER — PROPOFOL 10 MG/ML
INJECTION, EMULSION INTRAVENOUS PRN
Status: DISCONTINUED | OUTPATIENT
Start: 2020-04-24 | End: 2020-04-24

## 2020-04-24 RX ORDER — FENTANYL CITRATE 50 UG/ML
25-50 INJECTION, SOLUTION INTRAMUSCULAR; INTRAVENOUS EVERY 5 MIN PRN
Status: DISCONTINUED | OUTPATIENT
Start: 2020-04-24 | End: 2020-04-24 | Stop reason: HOSPADM

## 2020-04-24 RX ORDER — OXYCODONE HYDROCHLORIDE 5 MG/1
5 TABLET ORAL EVERY 6 HOURS PRN
Qty: 12 TABLET | Refills: 0 | Status: SHIPPED | OUTPATIENT
Start: 2020-04-24 | End: 2020-09-04

## 2020-04-24 RX ORDER — LIDOCAINE HYDROCHLORIDE 20 MG/ML
INJECTION, SOLUTION INFILTRATION; PERINEURAL PRN
Status: DISCONTINUED | OUTPATIENT
Start: 2020-04-24 | End: 2020-04-24

## 2020-04-24 RX ORDER — LEVOFLOXACIN 5 MG/ML
INJECTION, SOLUTION INTRAVENOUS PRN
Status: DISCONTINUED | OUTPATIENT
Start: 2020-04-24 | End: 2020-04-24

## 2020-04-24 RX ORDER — INDOMETHACIN 50 MG/1
SUPPOSITORY RECTAL PRN
Status: DISCONTINUED | OUTPATIENT
Start: 2020-04-24 | End: 2020-04-24 | Stop reason: HOSPADM

## 2020-04-24 RX ORDER — INDOMETHACIN 50 MG/1
100 SUPPOSITORY RECTAL
Status: DISCONTINUED | OUTPATIENT
Start: 2020-04-24 | End: 2020-04-24 | Stop reason: HOSPADM

## 2020-04-24 RX ORDER — ACETAMINOPHEN 325 MG/1
650 TABLET ORAL EVERY 8 HOURS
Status: DISCONTINUED | OUTPATIENT
Start: 2020-04-24 | End: 2020-04-25 | Stop reason: HOSPADM

## 2020-04-24 RX ORDER — HYDROMORPHONE HYDROCHLORIDE 1 MG/ML
.3-.5 INJECTION, SOLUTION INTRAMUSCULAR; INTRAVENOUS; SUBCUTANEOUS EVERY 5 MIN PRN
Status: DISCONTINUED | OUTPATIENT
Start: 2020-04-24 | End: 2020-04-24 | Stop reason: HOSPADM

## 2020-04-24 RX ORDER — IOPAMIDOL 510 MG/ML
INJECTION, SOLUTION INTRAVASCULAR PRN
Status: DISCONTINUED | OUTPATIENT
Start: 2020-04-24 | End: 2020-04-24 | Stop reason: HOSPADM

## 2020-04-24 RX ORDER — NALOXONE HYDROCHLORIDE 0.4 MG/ML
.1-.4 INJECTION, SOLUTION INTRAMUSCULAR; INTRAVENOUS; SUBCUTANEOUS
Status: ACTIVE | OUTPATIENT
Start: 2020-04-24 | End: 2020-04-25

## 2020-04-24 RX ORDER — DEXAMETHASONE SODIUM PHOSPHATE 4 MG/ML
INJECTION, SOLUTION INTRA-ARTICULAR; INTRALESIONAL; INTRAMUSCULAR; INTRAVENOUS; SOFT TISSUE PRN
Status: DISCONTINUED | OUTPATIENT
Start: 2020-04-24 | End: 2020-04-24

## 2020-04-24 RX ORDER — SODIUM CHLORIDE, SODIUM LACTATE, POTASSIUM CHLORIDE, CALCIUM CHLORIDE 600; 310; 30; 20 MG/100ML; MG/100ML; MG/100ML; MG/100ML
INJECTION, SOLUTION INTRAVENOUS CONTINUOUS
Status: DISCONTINUED | OUTPATIENT
Start: 2020-04-24 | End: 2020-04-24 | Stop reason: HOSPADM

## 2020-04-24 RX ORDER — SODIUM CHLORIDE, SODIUM LACTATE, POTASSIUM CHLORIDE, CALCIUM CHLORIDE 600; 310; 30; 20 MG/100ML; MG/100ML; MG/100ML; MG/100ML
INJECTION, SOLUTION INTRAVENOUS CONTINUOUS PRN
Status: DISCONTINUED | OUTPATIENT
Start: 2020-04-24 | End: 2020-04-24

## 2020-04-24 RX ORDER — SODIUM CHLORIDE, SODIUM LACTATE, POTASSIUM CHLORIDE, CALCIUM CHLORIDE 600; 310; 30; 20 MG/100ML; MG/100ML; MG/100ML; MG/100ML
INJECTION, SOLUTION INTRAVENOUS CONTINUOUS
Status: DISCONTINUED | OUTPATIENT
Start: 2020-04-24 | End: 2020-04-24

## 2020-04-24 RX ORDER — FENTANYL CITRATE 50 UG/ML
INJECTION, SOLUTION INTRAMUSCULAR; INTRAVENOUS PRN
Status: DISCONTINUED | OUTPATIENT
Start: 2020-04-24 | End: 2020-04-24

## 2020-04-24 RX ORDER — LIDOCAINE 40 MG/G
CREAM TOPICAL
Status: DISCONTINUED | OUTPATIENT
Start: 2020-04-24 | End: 2020-04-25 | Stop reason: HOSPADM

## 2020-04-24 RX ORDER — FLUMAZENIL 0.1 MG/ML
0.2 INJECTION, SOLUTION INTRAVENOUS
Status: ACTIVE | OUTPATIENT
Start: 2020-04-24 | End: 2020-04-24

## 2020-04-24 RX ORDER — ONDANSETRON 2 MG/ML
INJECTION INTRAMUSCULAR; INTRAVENOUS PRN
Status: DISCONTINUED | OUTPATIENT
Start: 2020-04-24 | End: 2020-04-24

## 2020-04-24 RX ORDER — POLYETHYLENE GLYCOL 3350 17 G/17G
17 POWDER, FOR SOLUTION ORAL DAILY PRN
Qty: 24 PACKET | Refills: 0 | Status: SHIPPED | OUTPATIENT
Start: 2020-04-24 | End: 2020-09-04

## 2020-04-24 RX ADMIN — LEVOFLOXACIN 500 MG: 5 INJECTION, SOLUTION INTRAVENOUS at 08:00

## 2020-04-24 RX ADMIN — ONDANSETRON 4 MG: 2 INJECTION INTRAMUSCULAR; INTRAVENOUS at 08:05

## 2020-04-24 RX ADMIN — OXYCODONE HYDROCHLORIDE 10 MG: 5 TABLET ORAL at 23:34

## 2020-04-24 RX ADMIN — PROPOFOL 20 MG: 10 INJECTION, EMULSION INTRAVENOUS at 08:39

## 2020-04-24 RX ADMIN — SODIUM CHLORIDE, POTASSIUM CHLORIDE, SODIUM LACTATE AND CALCIUM CHLORIDE: 600; 310; 30; 20 INJECTION, SOLUTION INTRAVENOUS at 11:01

## 2020-04-24 RX ADMIN — DEXAMETHASONE SODIUM PHOSPHATE 4 MG: 4 INJECTION, SOLUTION INTRA-ARTICULAR; INTRALESIONAL; INTRAMUSCULAR; INTRAVENOUS; SOFT TISSUE at 08:05

## 2020-04-24 RX ADMIN — LIDOCAINE HYDROCHLORIDE 100 MG: 20 INJECTION, SOLUTION INFILTRATION; PERINEURAL at 07:42

## 2020-04-24 RX ADMIN — FENTANYL CITRATE 50 MCG: 50 INJECTION, SOLUTION INTRAMUSCULAR; INTRAVENOUS at 08:06

## 2020-04-24 RX ADMIN — GLUCAGON HYDROCHLORIDE 0.4 MG: KIT at 08:10

## 2020-04-24 RX ADMIN — FENTANYL CITRATE 25 MCG: 50 INJECTION, SOLUTION INTRAMUSCULAR; INTRAVENOUS at 09:46

## 2020-04-24 RX ADMIN — ROCURONIUM BROMIDE 50 MG: 10 INJECTION INTRAVENOUS at 07:42

## 2020-04-24 RX ADMIN — PROPOFOL 150 MG: 10 INJECTION, EMULSION INTRAVENOUS at 07:42

## 2020-04-24 RX ADMIN — FENTANYL CITRATE 25 MCG: 50 INJECTION, SOLUTION INTRAMUSCULAR; INTRAVENOUS at 09:30

## 2020-04-24 RX ADMIN — FENTANYL CITRATE 50 MCG: 50 INJECTION, SOLUTION INTRAMUSCULAR; INTRAVENOUS at 07:42

## 2020-04-24 RX ADMIN — PIPERACILLIN AND TAZOBACTAM 3.38 G: 3; .375 INJECTION, POWDER, FOR SOLUTION INTRAVENOUS at 02:03

## 2020-04-24 RX ADMIN — OXYCODONE HYDROCHLORIDE 10 MG: 5 TABLET ORAL at 13:43

## 2020-04-24 RX ADMIN — OXYCODONE HYDROCHLORIDE 10 MG: 5 TABLET ORAL at 19:29

## 2020-04-24 RX ADMIN — SUGAMMADEX 200 MG: 100 INJECTION, SOLUTION INTRAVENOUS at 09:12

## 2020-04-24 RX ADMIN — FENTANYL CITRATE 25 MCG: 50 INJECTION, SOLUTION INTRAMUSCULAR; INTRAVENOUS at 08:39

## 2020-04-24 RX ADMIN — FENTANYL CITRATE 25 MCG: 50 INJECTION, SOLUTION INTRAMUSCULAR; INTRAVENOUS at 09:59

## 2020-04-24 RX ADMIN — POTASSIUM CHLORIDE, DEXTROSE MONOHYDRATE AND SODIUM CHLORIDE: 150; 5; 450 INJECTION, SOLUTION INTRAVENOUS at 11:02

## 2020-04-24 RX ADMIN — SODIUM CHLORIDE, POTASSIUM CHLORIDE, SODIUM LACTATE AND CALCIUM CHLORIDE: 600; 310; 30; 20 INJECTION, SOLUTION INTRAVENOUS at 07:33

## 2020-04-24 RX ADMIN — ONDANSETRON 4 MG: 2 INJECTION INTRAMUSCULAR; INTRAVENOUS at 09:47

## 2020-04-24 RX ADMIN — PIPERACILLIN AND TAZOBACTAM 3.38 G: 3; .375 INJECTION, POWDER, FOR SOLUTION INTRAVENOUS at 13:43

## 2020-04-24 RX ADMIN — ONDANSETRON 4 MG: 4 TABLET, ORALLY DISINTEGRATING ORAL at 22:04

## 2020-04-24 ASSESSMENT — ACTIVITIES OF DAILY LIVING (ADL)
ADLS_ACUITY_SCORE: 11

## 2020-04-24 ASSESSMENT — MIFFLIN-ST. JEOR: SCORE: 1380.14

## 2020-04-24 NOTE — PROVIDER NOTIFICATION
"Provider notified \"7B 38-2 Vlasin. Pt wondering if it is okay to take Lasix and spironolactone now because does was missed prior to surgery. BP Soft at 103/61. Thx Elke [id number]\"    Elke Molina RN on 4/24/2020 at 4:10 PM    "

## 2020-04-24 NOTE — DISCHARGE SUMMARY
NAME: Abiola Matute   MRN: [id number]   : 1965     DATE OF ADMISSION: 2020     PRE/POSTOPERATIVE DIAGNOSES:   Acute on chronic cholecystitis    PROCEDURES PERFORMED:   20 w/ GI: ERCP with cystic stent, pancreatic stent, and papillary sphicterotomy    PATHOLOGY RESULTS: None     CULTURE RESULTS: None     INTRAOPERATIVE COMPLICATIONS: None     POSTOPERATIVE COMPLICATIONS: None     DRAINS/TUBES PRESENT AT DISCHARGE:  Cystic duct stent- these often remain indefinitely  Pancreatic duct stent- will likely pass spontaneously in the next several weeks, if not will require endoscopic removal by GI    DATE OF DISCHARGE:  20    HOSPITAL COURSE:   Abiola Matute is a 54 year old woman with alcoholic cirrhosis, Crohn's disease (Remicade q 8weeks, last dose ) who presented to [facility] and suspected to have chronic intermittent cholecystitis based on HIDA and ultrasound (prior cholecystitis episode in Oct 2019 managed on IV Abx). She was transferred to [facility] for percutaneous cholecystostomy by IR, but a drain was not placed on  as the gallbladder had contracted and wasn't distended on bedside ultrasound. Patient is now s/p above procedure.     She tolerated the procedure well and post-procedure was transferred back to the general post-surgical unit. The remainder of her course was essentially uncomplicated. Prior to discharge, her pain was controlled well, she was able to perform ADLs and ambulate independently without difficulty, and had full return of bowel and bladder function.  On 20, she was discharged to home in stable condition.    DISCHARGE INSTRUCTIONS:  Discharge Procedure Orders   Reason for your hospital stay   Order Comments: Acute on chronic cholecystitis s/p ERCP with cystic duct and pancreatic duct stent placement     Discharge Instructions   Order Comments: Discharge Instructions    Activity  - No activity restrictions    Diet  - Regular diet, though avoid  fatty foods as able    Incisions  - You do not have any incisions    Medications  - Take Tylenol regularly for the first several days following your procedure.   - Do not take more than 2,000mg of acetaminophen in any 24 hour period, as this can cause liver damage  - Take oxycodone as needed for pain. No driving while taking narcotics.  - Take stool softeners such as Senna or Miralax while you are using narcotics, but stop if you develop diarrhea.  - Wean yourself off of narcotic pain medications  - Avoid NSAIDs (ibuprofen, aspirin) given your history of liver disease  - Your prior to arrival ursodiol was stopped upon discharge    Follow-Up:  - Call your primary care provider to touch base regarding your recent admission.     - Call or return sooner than your regularly scheduled visit if you develop any of the following: fever >101.5, uncontrolled pain, uncontrolled nausea or vomiting, as well as increased redness, swelling, or drainage from your wound.   - Your cystic duct stent may remain indefinitely. Your pancreatic duct stent will likely pass spontaneously in the next several weeks. You will undergo an abdominal x-ray in ~4 weeks to evaluate your stents and then follow up with Gastroenterology at that time.  - If after GI follow up you are interested in surgery or discussing potential surgery, call the General Surgery Clinic at [phone] to make an appointment with Dr. Estrada. Call [phone] and ask to speak with the Surgery resident on call if you are having troubles in the evenings, at night, or on the weekend.     Adult [facility] Follow-up and recommended labs and tests   Order Comments: [facility] Gastroenterology for AXR to evaluate stents in one month    Follow up with [facility] General Surgery, Dr. Estrada, as needed.    Appointments on [facility] and/or [facility] (with [facility] or [facility] provider or service). Call [phone] if you haven't heard regarding these appointments within 7 days of discharge.        DISCHARGE MEDICATIONS:    Virginia Matute   Home Medication Instructions CLAU:[id number]    Printed on:04/24/20 7691   Medication Information                      acetaminophen (TYLENOL) 325 MG tablet  Take 1-2 tablets (325-650 mg) by mouth every 6 hours as needed for mild pain Take either 325mg every 6 hours or 650mg every 8 hours for pain. Do not exceed 2000mg in 24 hours.             furosemide (LASIX) 40 MG tablet  Take 1 tablet (40 mg) by mouth daily             Milk Thistle-Dand-Fennel-Licor (MILK THISTLE XTRA) CAPS capsule  Take 1 capsule by mouth daily             Multiple Vitamins-Minerals (MULTIVITAMIN & MINERAL PO)  Take  by mouth daily.             oxyCODONE (ROXICODONE) 5 MG tablet  Take 1 tablet (5 mg) by mouth every 6 hours as needed for breakthrough pain             polyethylene glycol (MIRALAX) 17 g packet  Take 17 g by mouth daily as needed (to prevent constipation while taking narcotic pain medication)             senna-docusate (SENOKOT-S/PERICOLACE) 8.6-50 MG tablet  Take 1 tablet by mouth 2 times daily as needed (to prevent constipation while taking narcotic pain medication)             spironolactone (ALDACTONE) 100 MG tablet  Take 1 tablet (100 mg) by mouth daily                 Araceli Zapata MD  Surgery, PGY1  [phone]

## 2020-04-24 NOTE — PLAN OF CARE
Vitals: Temp: 97.7  F (36.5  C) Temp src: Oral BP: 103/61 Pulse: 80 Heart Rate: 84 Resp: 13 SpO2: 95 % O2 Device: None (Room air)       Time: 2798-3170  Reason for admission: POD#0 s/p ERCP w/ cystic duct dilation and stent, pancreatic duct stent, and sphincterotomy  Activity:  Up ad martinez  Pain:  Pt reports 7/10 abdominal pain controlled with PRN oxycodone x1  Neuro: A&O x4, able to make needs known.   Cardiac: WDL, intermittently soft BP.   Respiratory:  WDL, LS clear. Denies SOB  GI/: +BS, +Flatus, voiding russel in color with sediment. Pt reports hx of anal fistulas.   Diet: Reg diet, pt taking it slow. No increased abdominal pain after eating.   Lines:  L PIV SL, R PIV infusing IVMF at 75ml/hr  Incisions/Drains/Skin: Intact. Bronze in color.   Lab:  WBC 3.5, plt 82. INR 1.33  New changes this shift: No acute changes. Possible discharge tomorrow.      Continue to monitor and follow POC

## 2020-04-24 NOTE — PROGRESS NOTES
GASTROENTEROLOGY PROGRESS NOTE    Assessment:  Abiola Matute is a 54 year old woman with alcoholic cirrhosis, Crohn's disease (Remicade q 8weeks, last dose March 18) who presented to [facility] and suspected to have chronic intermittent cholecystitis based on HIDA and ultrasound (prior cholecystitis episode in Oct 2019 managed on IV Abx). She was transferred to [facility] for percutaneous cholecystostomy by IR, but a drain was not placed on 4/22 as the gallbladder had contracted and wasn't distended on bedside ultrasound. Patient is now s/p ERCP with cystic stent, pancreatic stent, and papillary sphicterotomy on 4/24/2020.     #Chronic Intermittent Cholecystitis              ERCP on 4/24 revealed subjectively stenotic major papilla with prophylactic Lacy pancreatic stent, normal biliary tree with multiple stones. A 6 mm biliary sphicterotomy was performed and the cystic duct take off was dilated with a pigtailed Zimmon stent with adequate drainage. (see consult note for additional history).     #Decompensated Alcoholic + DUNN Cirrosis               04/24/2020 MELD-Na: 14. Follows with Dr. Cervantes. Prior history of ascites in October 2019. Currently well-controlled on spironolactone 100 mg and lasix 40 mg PO daily. No varices on EGD in 12/05/2019     #Chron's Disease              Followed by Dr. Schilling. On infliximab q 8 weeks (last dose March 18; next dose due week of May 14th) Denies flares, hematochezia, weightloss, fevers. She reports her pain is different from her typical Crohn's disease.     Recommendations:  - No ASA or NSAIDs for 3 days  - Confirm spontaneous stent passage of pancreatic stent by performing a flat and upright abdominal X-ray in 4 weeks. If stent is present, she will need an upper endoscopy to remove the pancreatic stent. Transcystic stents are typically left indefinitely.  - Recommend discontinuation of ursodiol, as this is generally indicated for a single gallstone <2cm in  "size.    The patient was discussed and plan agreed upon with GI staff.    Tashi Paulino MD   Gastroenterology  [phone]  _______________________________________________________________  S: Patient reports she is \"doing well\" after her procedure. No acute abdominal complaints at this time. She has only been doing ice chips. No bowel or bladder movements since procedure.    O:  Blood pressure 105/68, pulse 80, temperature 97.7  F (36.5  C), temperature source Oral, resp. rate 13, height 1.676 m (5' 6\"), weight 74.6 kg (164 lb 7.4 oz), last menstrual period 05/31/2006, SpO2 91 %, not currently breastfeeding.    Gen: Awake, NAD  HEENT: no scleral icterus, EOMI  CV: S1 S2 RRR no m/g/r  Lungs: CTAB, on 1L NC for comfort  Abd: Flat, +BS, RUQ/epigastric tenderness, no fluid wave sign  Skin: No jaundice  Extremities: Pulses present, No peripheral edema  Neuro: Ox3, No asterixis    LABS:  BMP  Recent Labs   Lab 04/24/20 0623 04/21/20 1735 04/21/20 0226    141 137   POTASSIUM 4.2 4.7 3.3*   CHLORIDE 104 109 105   ARIEL 8.5 8.9 9.4   CO2 25 26 23   BUN 9 10 13   CR 0.78 0.66 0.68   * 96 125*     CBC  Recent Labs   Lab 04/24/20 0623 04/21/20  1830 04/21/20  0226   WBC 3.5* 4.5 4.6   RBC 3.92 3.62* 4.14   HGB 13.4 12.5 14.1   HCT 40.0 38.1 41.5   * 105* 100   MCH 34.2* 34.5* 34.1*   MCHC 33.5 32.8 34.0   RDW 14.6 15.2* 14.6   PLT 82* 88* 98*     INR  Recent Labs   Lab 04/24/20 0623 04/21/20 1735   INR 1.33* 1.32*     LFTs  Recent Labs   Lab 04/24/20  0623 04/21/20  1735 04/21/20  0226   ALKPHOS 118 128 169*   AST 52* 50* 57*   ALT 32 34 38   BILITOTAL 2.1* 1.8* 2.0*   PROTTOTAL 7.2 7.7 8.8   ALBUMIN 2.6* 3.0* 3.4      PANC  Recent Labs   Lab 04/24/20  0623 04/21/20  1830 04/21/20  0226   LIPASE 216 174 284   AMYLASE 79  --   --      "

## 2020-04-24 NOTE — PLAN OF CARE
"/60 (BP Location: Right arm)   Pulse 73   Temp 97.6  F (36.4  C) (Oral)   Resp 18   Ht 1.676 m (5' 6\")   Wt 74.6 kg (164 lb 7.4 oz)   LMP 05/31/2006   SpO2 96%   BMI 26.55 kg/m      Reason for admission: Cholecystitis  Neuro: A&Ox4, able to make needs known  Cardiac: soft bps, no chest pain  Respiratory: WDL, no SOB  GI/: voiding not saving, LBM: 4/23  Skin: WDL  Pain: managed with Oxycodone x1  Lines: R PIV- D5 1/2 NS + KCl 20 @ 75/hr with Zosyn  Drains: X  Incisions: X  Activity: independent  Diet: NPO for procedure  Labs: reviewed  Changes/Plan: transport coming @ 0600 for pt, ERCP & stent placement, continue POC  "

## 2020-04-24 NOTE — ANESTHESIA PREPROCEDURE EVALUATION
Anesthesia Pre-Procedure Evaluation    Patient: Abiola Matute   MRN:     [id number] Gender:   female   Age:    54 year old :      1965        Preoperative Diagnosis: Cholecystitis [K81.9]   Procedure(s):  ENDOSCOPIC RETROGRADE CHOLANGIOPANCREATOGRAPHY WITH POSSIBLE CYSTIC DUCT STENT     LABS:  CBC:   Lab Results   Component Value Date    WBC 4.5 2020    WBC 4.6 2020    HGB 12.5 2020    HGB 14.1 2020    HCT 38.1 2020    HCT 41.5 2020    PLT 88 (L) 2020    PLT 98 (L) 2020     BMP:   Lab Results   Component Value Date     2020     2020    POTASSIUM 4.7 2020    POTASSIUM 3.3 (L) 2020    CHLORIDE 109 2020    CHLORIDE 105 2020    CO2 26 2020    CO2 23 2020    BUN 10 2020    BUN 13 2020    CR 0.66 2020    CR 0.68 2020    GLC 96 2020     (H) 2020     COAGS:   Lab Results   Component Value Date    PTT 41 (H) 2020    INR 1.32 (H) 2020     POC:   Lab Results   Component Value Date     (H) 10/23/2019    HCG Negative 2006     OTHER:   Lab Results   Component Value Date    A1C 4.6 2017    ARIEL 8.9 2020    MAG 2.0 2014    ALBUMIN 3.0 (L) 2020    PROTTOTAL 7.7 2020    ALT 34 2020    AST 50 (H) 2020     (H) 2017    ALKPHOS 128 2020    BILITOTAL 1.8 (H) 2020    LIPASE 174 2020    TSH 1.56 2018    CRP 6.7 2020    SED 59 (H) 2020        Preop Vitals    BP Readings from Last 3 Encounters:   20 107/66   20 125/68   20 114/76    Pulse Readings from Last 3 Encounters:   20 77   20 91   20 75      Resp Readings from Last 3 Encounters:   20 18   20 18   20 16    SpO2 Readings from Last 3 Encounters:   20 97%   20 97%   20 100%      Temp Readings from Last 1 Encounters:   20 37.1  C (98.7  F)  "(Oral)    Ht Readings from Last 1 Encounters:   04/21/20 1.676 m (5' 6\")      Wt Readings from Last 1 Encounters:   04/23/20 74.6 kg (164 lb 7.4 oz)    Estimated body mass index is 26.55 kg/m  as calculated from the following:    Height as of this encounter: 1.676 m (5' 6\").    Weight as of this encounter: 74.6 kg (164 lb 7.4 oz).     LDA:  Peripheral IV 04/22/20 Right;Anterior Lower forearm (Active)   Site Assessment WDL 04/23/20 1600   Line Status Infusing 04/23/20 1600   Phlebitis Scale 0-->no symptoms 04/23/20 1600   Infiltration Scale 0 04/23/20 1600   Infiltration Site Treatment Method  None 04/23/20 1600   Extravasation? No 04/23/20 1600   Number of days: 1        Past Medical History:   Diagnosis Date     Alcohol abuse      Alcoholic cirrhosis of liver with ascites      Atypical ductal hyperplasia of breast 9/10/10    ERT not recommended -left - and flat epithelial atypia-scheduled for breast biopsy 9/17/2010      Bifid uvula      Cholelithiasis      Contact perianal dermatitis and other eczema     recurrent - clobetasol      Crohn disease      Fear of flying      - gets Ativan prn.      Hypertension      IBS (irritable bowel syndrome)       Past Surgical History:   Procedure Laterality Date     BIOPSY ANAL N/A 3/28/2019    anal biopsy and culure placement of seton - Dr Fleming     BIOPSY BREAST Left 09/17/2010    - scheduled with Dr. Varma      BIOPSY LIVER  2019     C APPENDECTOMY  at age 14     COLONOSCOPY  2006     COLONOSCOPY N/A 12/23/2014    Procedure: COMBINED COLONOSCOPY, SINGLE OR MULTIPLE BIOPSY/POLYPECTOMY BY BIOPSY;  Surgeon: Diane Fleming MD;  Location:  GI     COLONOSCOPY N/A 12/5/2019    Procedure: COLONOSCOPY, WITH POLYPECTOMY AND BIOPSY;  Surgeon: Farhan Schilling MD;  Location: [facility]     ESOPHAGOSCOPY, GASTROSCOPY, DUODENOSCOPY (EGD), COMBINED N/A 12/5/2019    Procedure: ESOPHAGOGASTRODUODENOSCOPY (EGD);  Surgeon: Farhan Schilling MD;  Location:  GI     EXAM UNDER " ANESTHESIA ANUS N/A 3/28/2019    Procedure: EXAM UNDER ANESTHESIA ANUS;  Surgeon: Diane Fleming MD;  Location: [facility] OR     HYSTERECTOMY, VAGINAL  2006    with Dr. Licha Zhou - with BSO for fibroids      IR GALLBLADDER DRAIN PLACEMENT  4/22/2020     OPEN REDUCTION INTERNAL FIXATION ANKLE Left at age 27    plates and screws removed at age 36     Pelviscopy with removal of bilateral hydrosalpinges.  04/15/2010      Allergies   Allergen Reactions     Fish Oil      Redness and itching around eye area only - went away when fish oil capsules stopped      Metronidazole      pain/itching     Naphthalenemethylamines      Lamisil = mild urticarial reaction     Penicillins Cramps     Vomiting     Ppd [Tuberculin Purified Protein Derivative]      Sulfa Drugs      hives        Anesthesia Evaluation     .             ROS/MED HX    ENT/Pulmonary:  - neg pulmonary ROS     Neurologic:  - neg neurologic ROS     Cardiovascular:     (+) hypertension----. : . . . :. .       METS/Exercise Tolerance:     Hematologic:  - neg hematologic  ROS       Musculoskeletal:         GI/Hepatic: Comment: EtoH cirrhosis c/b ascites  Crohns disease  cholecystitis    (+) liver disease,       Renal/Genitourinary:         Endo:  - neg endo ROS       Psychiatric:         Infectious Disease:         Malignancy:         Other:                     JZG FV AN PHYSICAL EXAM    Assessment:   ASA SCORE: 3    H&P: History and physical reviewed and following examination; no interval change.   Smoking Status:  Non-Smoker/Unknown        Plan:   Anes. Type:  General   Pre-Medication: None   Induction:  IV (RSI)   Airway: ETT; Oral   Access/Monitoring: PIV   Maintenance: Balanced     Postop Plan:   Postop Pain: Opioids  Postop Sedation/Airway: Not planned     PONV Management:   Adult Risk Factors: Female, Non-Smoker, Postop Opioids                   Tu Trujillo MD

## 2020-04-24 NOTE — ANESTHESIA POSTPROCEDURE EVALUATION
Anesthesia POST Procedure Evaluation    Patient: Abiola Matute   MRN:     [id number] Gender:   female   Age:    54 year old :      1965        Preoperative Diagnosis: Cholecystitis [K81.9]   Procedure(s):  ENDOSCOPIC RETROGRADE CHOLANGIOPANCREATOGRAPHY WITH, sledge removal,sphincterotomy, stent in gallbladder and pancreatic duct stent, and balloon dilation   Postop Comments: No value filed.     Anesthesia Type: General       Disposition: Outpatient   Postop Pain Control: Uneventful            Sign Out: Well controlled pain   PONV: No   Neuro/Psych: Uneventful            Sign Out: Acceptable/Baseline neuro status   Airway/Respiratory: Uneventful            Sign Out: Acceptable/Baseline resp. status   CV/Hemodynamics: Uneventful            Sign Out: Acceptable CV status   Other NRE: NONE   DID A NON-ROUTINE EVENT OCCUR? No         Last Anesthesia Record Vitals:  CRNA VITALS  2020 0900 - 2020 1000      2020             NIBP:  129/75    Pulse:  84          Last PACU Vitals:  Vitals Value Taken Time   /77 2020 11:13 AM   Temp 36.5  C (97.7  F) 2020 11:00 AM   Pulse 76 2020 10:20 AM   Resp 11 2020 11:13 AM   SpO2 94 % 2020 11:13 AM   Temp src     NIBP 129/75 2020  9:35 AM   Pulse 84 2020  9:35 AM   SpO2     Resp     Temp     Ht Rate     Temp 2     Vitals shown include unvalidated device data.      Electronically Signed By: Tu Trujillo MD, 2020, 11:30 AM

## 2020-04-24 NOTE — PROGRESS NOTES
"SURGERY PROGRESS NOTE    S: patient reports 7/10 RUQ pain s/p procedure earlier today. No nausea, has not had much to drink since the procedure.     O:   /68 (BP Location: Right arm)   Pulse 80   Temp 97.7  F (36.5  C) (Oral)   Resp 13   Ht 1.676 m (5' 6\")   Wt 74.6 kg (164 lb 7.4 oz)   LMP 05/31/2006   SpO2 91%   BMI 26.55 kg/m    NAD, resting in bed  Non-cyanotic  Non-labored breathing on RA  Abd soft, tender to palpation mid abdomen and RUQ, no rigidity or guarding    Labs:   WBC 3.5  HGB 13.4  Creat 0.78  TBili 2.1  Alk Phos 118  ALT/AST 32/52    A/P:   53 yo female with etoh liver cirrhosis (meld score 12) admitted with acute on chronic cholecystitis now s/p ERCP w/ cystic duct dilation and stent, pancreatic duct stent, and sphincterotomy this morning. Some pain post-procedure, but otherwise doing well.     - low dose S Tylenol, PRN oxy  - CLD  - d/c IV Zosyn  - PTA Lasix and spironolactove  - d/c PTA ursodiol  - am labs  - will stay overnight, likely d/c tomorrow pending how she is feeling  - will need flat/upright AXR in 4 weeks to confirm spontaneous passage of PD stent, if still present will need endoscopic removal per GI (GI to order)    Discussed with staff attending.    Araceli Zapata MD  Surgery, PGY1  [phone]  "

## 2020-04-24 NOTE — PLAN OF CARE
Activity: Independent  Neuros: A&O x 4, neuros intact. Denies numbness and tingling.  Cardiac: WDL, denies pain.  Respiratory: WDL, stable on RA. Denies SOB.  GI: WDL, +BS, +flatus. PRN miralax administered. Pt reported bowel movement this evening.  : Voiding, not saving.  Diet: Regular, fair appetite. NPO at midnight.  Skin/Incisions: Warm, dry, intact. No new areas of concern.  Lines/Drains: R PIV infusing MIVF @ 75 mL/hr, running zosyn q6 hr.  Labs: Reviewed.  Pain/nausea:  Pt reports pain as comfortable manageable and denies nausea.  Plan: NPO for ERCP and stent placement tomorrow. Continue to monitor and follow POC.

## 2020-04-24 NOTE — ANESTHESIA CARE TRANSFER NOTE
Patient: Abiola Matute    Procedure(s):  ENDOSCOPIC RETROGRADE CHOLANGIOPANCREATOGRAPHY WITH, sledge removal,sphincterotomy, stent in gallbladder and pancreatic duct stent, and balloon dilation    Diagnosis: Cholecystitis [K81.9]  Diagnosis Additional Information: No value filed.    Anesthesia Type:   General     Note:  Airway :Face Mask  Patient transferred to:PACU  Comments: VSS.  Report given to RN. Handoff Report: Identifed the Patient, Identified the Reponsible Provider, Reviewed the pertinent medical history, Discussed the surgical course, Reviewed Intra-OP anesthesia mangement and issues during anesthesia, Set expectations for post-procedure period and Allowed opportunity for questions and acknowledgement of understanding      Vitals: (Last set prior to Anesthesia Care Transfer)    CRNA VITALS  4/24/2020 0900 - 4/24/2020 0936      4/24/2020             Pulse:  86    SpO2:  100 %    EKG:  BP  RR    Sinus rhythm  127/85  12                Electronically Signed By: DARYN Muhammad CRNA  April 24, 2020  9:36 AM

## 2020-04-25 VITALS
WEIGHT: 169.4 LBS | SYSTOLIC BLOOD PRESSURE: 110 MMHG | BODY MASS INDEX: 27.23 KG/M2 | DIASTOLIC BLOOD PRESSURE: 65 MMHG | TEMPERATURE: 97.8 F | OXYGEN SATURATION: 97 % | HEIGHT: 66 IN | RESPIRATION RATE: 14 BRPM | HEART RATE: 74 BPM

## 2020-04-25 LAB
ALBUMIN SERPL-MCNC: 2.5 G/DL (ref 3.4–5)
ALP SERPL-CCNC: 114 U/L (ref 40–150)
ALT SERPL W P-5'-P-CCNC: 29 U/L (ref 0–50)
ANION GAP SERPL CALCULATED.3IONS-SCNC: 6 MMOL/L (ref 3–14)
AST SERPL W P-5'-P-CCNC: 42 U/L (ref 0–45)
BILIRUB SERPL-MCNC: 1.6 MG/DL (ref 0.2–1.3)
BUN SERPL-MCNC: 12 MG/DL (ref 7–30)
CALCIUM SERPL-MCNC: 8.3 MG/DL (ref 8.5–10.1)
CHLORIDE SERPL-SCNC: 104 MMOL/L (ref 94–109)
CO2 SERPL-SCNC: 24 MMOL/L (ref 20–32)
CREAT SERPL-MCNC: 0.67 MG/DL (ref 0.52–1.04)
ERYTHROCYTE [DISTWIDTH] IN BLOOD BY AUTOMATED COUNT: 14.5 % (ref 10–15)
GFR SERPL CREATININE-BSD FRML MDRD: >90 ML/MIN/{1.73_M2}
GLUCOSE SERPL-MCNC: 128 MG/DL (ref 70–99)
HCT VFR BLD AUTO: 36.9 % (ref 35–47)
HGB BLD-MCNC: 12.4 G/DL (ref 11.7–15.7)
LIPASE SERPL-CCNC: 205 U/L (ref 73–393)
MCH RBC QN AUTO: 34.3 PG (ref 26.5–33)
MCHC RBC AUTO-ENTMCNC: 33.6 G/DL (ref 31.5–36.5)
MCV RBC AUTO: 102 FL (ref 78–100)
PLATELET # BLD AUTO: 71 10E9/L (ref 150–450)
POTASSIUM SERPL-SCNC: 4.6 MMOL/L (ref 3.4–5.3)
PROT SERPL-MCNC: 6.8 G/DL (ref 6.8–8.8)
RBC # BLD AUTO: 3.62 10E12/L (ref 3.8–5.2)
SODIUM SERPL-SCNC: 134 MMOL/L (ref 133–144)
WBC # BLD AUTO: 5.8 10E9/L (ref 4–11)

## 2020-04-25 PROCEDURE — 36415 COLL VENOUS BLD VENIPUNCTURE: CPT | Performed by: STUDENT IN AN ORGANIZED HEALTH CARE EDUCATION/TRAINING PROGRAM

## 2020-04-25 PROCEDURE — 83690 ASSAY OF LIPASE: CPT | Performed by: STUDENT IN AN ORGANIZED HEALTH CARE EDUCATION/TRAINING PROGRAM

## 2020-04-25 PROCEDURE — G0378 HOSPITAL OBSERVATION PER HR: HCPCS

## 2020-04-25 PROCEDURE — 80053 COMPREHEN METABOLIC PANEL: CPT | Performed by: STUDENT IN AN ORGANIZED HEALTH CARE EDUCATION/TRAINING PROGRAM

## 2020-04-25 PROCEDURE — 85027 COMPLETE CBC AUTOMATED: CPT | Performed by: STUDENT IN AN ORGANIZED HEALTH CARE EDUCATION/TRAINING PROGRAM

## 2020-04-25 PROCEDURE — 25000132 ZZH RX MED GY IP 250 OP 250 PS 637: Performed by: STUDENT IN AN ORGANIZED HEALTH CARE EDUCATION/TRAINING PROGRAM

## 2020-04-25 RX ORDER — ONDANSETRON 4 MG/1
4 TABLET, ORALLY DISINTEGRATING ORAL EVERY 8 HOURS PRN
Qty: 12 TABLET | Refills: 0 | Status: SHIPPED | OUTPATIENT
Start: 2020-04-25 | End: 2020-09-04

## 2020-04-25 RX ORDER — ONDANSETRON 4 MG/1
4 TABLET, ORALLY DISINTEGRATING ORAL EVERY 8 HOURS PRN
Qty: 12 TABLET | Refills: 0 | Status: SHIPPED | OUTPATIENT
Start: 2020-04-25 | End: 2020-04-25

## 2020-04-25 RX ADMIN — OXYCODONE HYDROCHLORIDE 10 MG: 5 TABLET ORAL at 04:52

## 2020-04-25 RX ADMIN — SPIRONOLACTONE 100 MG: 50 TABLET, FILM COATED ORAL at 08:39

## 2020-04-25 RX ADMIN — OXYCODONE HYDROCHLORIDE 5 MG: 5 TABLET ORAL at 10:04

## 2020-04-25 RX ADMIN — FUROSEMIDE 40 MG: 40 TABLET ORAL at 08:39

## 2020-04-25 ASSESSMENT — ACTIVITIES OF DAILY LIVING (ADL)
ADLS_ACUITY_SCORE: 11

## 2020-04-25 NOTE — PLAN OF CARE
Vitals: Temp: 97.8  F (36.6  C) Temp src: Oral BP: 110/65 Pulse: 74 Heart Rate: 80 Resp: 14 SpO2: 97 % O2 Device: None (Room air)       Time: 4529-3757  Reason for admission: POD#1 s/p ERCP w/ cystic duct dilation and stent, pancreatic duct stent, and sphincterotomy  Activity:  Up ad martinez  Pain:  Pt reports abdominal discomfort controlled with PRN oxycodone x1.  Neuro: A&O x4, able to make needs known.   Cardiac: WDL  Respiratory:  WDL, LS clear. Denies SOB  GI/: +BS, +Flatus, voiding spontaneously not saving. Pt reports hx of anal fistulas.   Diet: Reg diet, tolerating  Lines:  L PIV SL, and removed at discharge.   Incisions/Drains/Skin: Intact. Bronze in color.   Lab:  WBC 5.8, plt 71, Na 134  New changes this shift: No acute changes. Pt discharged after reviewing discharge instructions and all questions answered. Pt left on foot to discharge pharmacy to be picked up by  in lobby.

## 2020-04-25 NOTE — PLAN OF CARE
"/59 (BP Location: Right arm)   Pulse 73   Temp 97.3  F (36.3  C) (Oral)   Resp 16   Ht 1.676 m (5' 6\")   Wt 76.8 kg (169 lb 6.4 oz)   LMP 05/31/2006   SpO2 95%   BMI 27.34 kg/m      VSS. Afebrile. Pt denies shortness of breath or chest pain. LS clear/diminished. Pt reporting abdominal discomfort (improved from previously before where it was 'pain'). Pt took oral oxycodone 10 mg x2 during shift w/ relief in abdominal discomfort. Pt up voiding spontaneously during shift w/ pt report of 2 BMs during the evening. MIVF infusing @ 75 mL/hr via L PIV. Oral zofran given x1 for intermittent nausea. Possible plan for discharge   "

## 2020-04-26 ENCOUNTER — PATIENT OUTREACH (OUTPATIENT)
Dept: CARE COORDINATION | Facility: CLINIC | Age: 55
End: 2020-04-26

## 2020-04-28 NOTE — PROGRESS NOTES
Patient was called three times and no answer so post 24 hr DC follow up calls will be closed out    Emily Peraza CMA   Post Hospital Discharge Team

## 2020-04-30 ENCOUNTER — PATIENT OUTREACH (OUTPATIENT)
Dept: GASTROENTEROLOGY | Facility: CLINIC | Age: 55
End: 2020-04-30

## 2020-04-30 DIAGNOSIS — K81.9 CHOLECYSTITIS: Primary | ICD-10-CM

## 2020-04-30 NOTE — TELEPHONE ENCOUNTER
Per inpatient team patient needs abd xray in 4 weeks to check for stent. ERCP on 4/24/2020    Orders placed.

## 2020-05-14 ENCOUNTER — TELEPHONE (OUTPATIENT)
Dept: ONCOLOGY | Facility: CLINIC | Age: 55
End: 2020-05-14

## 2020-05-14 NOTE — TELEPHONE ENCOUNTER
Patient is currently scheduled for an appointment at [facility] in [place].  Called patient to review current visitor restrictions and complete COVID-19 Patient Infection (Travel) Screening Tool.    No answer, voicemail message left.  Instructed patient to call the clinic to complete pre-screening.    Shari Schoenberger, CMA

## 2020-05-15 ENCOUNTER — HOSPITAL ENCOUNTER (OUTPATIENT)
Facility: CLINIC | Age: 55
Setting detail: SPECIMEN
Discharge: HOME | End: 2020-05-15
Attending: INTERNAL MEDICINE | Admitting: INTERNAL MEDICINE
Payer: COMMERCIAL

## 2020-05-15 ENCOUNTER — INFUSION THERAPY VISIT (OUTPATIENT)
Dept: INFUSION THERAPY | Facility: CLINIC | Age: 55
End: 2020-05-15
Attending: INTERNAL MEDICINE
Payer: COMMERCIAL

## 2020-05-15 VITALS
WEIGHT: 164.8 LBS | SYSTOLIC BLOOD PRESSURE: 114 MMHG | TEMPERATURE: 98.5 F | DIASTOLIC BLOOD PRESSURE: 75 MMHG | BODY MASS INDEX: 26.6 KG/M2 | RESPIRATION RATE: 16 BRPM | OXYGEN SATURATION: 99 % | HEART RATE: 81 BPM

## 2020-05-15 DIAGNOSIS — K50.119 CROHN'S DISEASE OF PERIANAL REGION WITH COMPLICATION (H): Primary | ICD-10-CM

## 2020-05-15 LAB
ALBUMIN SERPL-MCNC: 3.3 G/DL (ref 3.4–5)
ALP SERPL-CCNC: 144 U/L (ref 40–150)
ALT SERPL W P-5'-P-CCNC: 37 U/L (ref 0–50)
AST SERPL W P-5'-P-CCNC: 59 U/L (ref 0–45)
BASOPHILS # BLD AUTO: 0 10E9/L (ref 0–0.2)
BASOPHILS NFR BLD AUTO: 0.7 %
BILIRUB DIRECT SERPL-MCNC: 0.8 MG/DL (ref 0–0.2)
BILIRUB SERPL-MCNC: 2 MG/DL (ref 0.2–1.3)
CRP SERPL-MCNC: <2.9 MG/L (ref 0–8)
DIFFERENTIAL METHOD BLD: ABNORMAL
EOSINOPHIL # BLD AUTO: 0.2 10E9/L (ref 0–0.7)
EOSINOPHIL NFR BLD AUTO: 6.1 %
ERYTHROCYTE [DISTWIDTH] IN BLOOD BY AUTOMATED COUNT: 14.4 % (ref 10–15)
ERYTHROCYTE [SEDIMENTATION RATE] IN BLOOD BY WESTERGREN METHOD: 42 MM/H (ref 0–30)
HCT VFR BLD AUTO: 38.9 % (ref 35–47)
HGB BLD-MCNC: 13.5 G/DL (ref 11.7–15.7)
IMM GRANULOCYTES # BLD: 0 10E9/L (ref 0–0.4)
IMM GRANULOCYTES NFR BLD: 0 %
LYMPHOCYTES # BLD AUTO: 1 10E9/L (ref 0.8–5.3)
LYMPHOCYTES NFR BLD AUTO: 34.9 %
MCH RBC QN AUTO: 35 PG (ref 26.5–33)
MCHC RBC AUTO-ENTMCNC: 34.7 G/DL (ref 31.5–36.5)
MCV RBC AUTO: 101 FL (ref 78–100)
MONOCYTES # BLD AUTO: 0.2 10E9/L (ref 0–1.3)
MONOCYTES NFR BLD AUTO: 6.5 %
NEUTROPHILS # BLD AUTO: 1.4 10E9/L (ref 1.6–8.3)
NEUTROPHILS NFR BLD AUTO: 51.8 %
NRBC # BLD AUTO: 0 10*3/UL
NRBC BLD AUTO-RTO: 0 /100
PLATELET # BLD AUTO: 102 10E9/L (ref 150–450)
PROT SERPL-MCNC: 8.1 G/DL (ref 6.8–8.8)
RBC # BLD AUTO: 3.86 10E12/L (ref 3.8–5.2)
WBC # BLD AUTO: 2.8 10E9/L (ref 4–11)

## 2020-05-15 PROCEDURE — 96415 CHEMO IV INFUSION ADDL HR: CPT

## 2020-05-15 PROCEDURE — 25800030 ZZH RX IP 258 OP 636: Performed by: INTERNAL MEDICINE

## 2020-05-15 PROCEDURE — 85652 RBC SED RATE AUTOMATED: CPT | Performed by: INTERNAL MEDICINE

## 2020-05-15 PROCEDURE — 85025 COMPLETE CBC W/AUTO DIFF WBC: CPT | Performed by: INTERNAL MEDICINE

## 2020-05-15 PROCEDURE — 96413 CHEMO IV INFUSION 1 HR: CPT

## 2020-05-15 PROCEDURE — 86140 C-REACTIVE PROTEIN: CPT | Performed by: INTERNAL MEDICINE

## 2020-05-15 PROCEDURE — 80076 HEPATIC FUNCTION PANEL: CPT | Performed by: INTERNAL MEDICINE

## 2020-05-15 PROCEDURE — 25000128 H RX IP 250 OP 636: Performed by: INTERNAL MEDICINE

## 2020-05-15 RX ORDER — DIPHENHYDRAMINE HCL 25 MG
25 CAPSULE ORAL ONCE
Status: CANCELLED
Start: 2020-05-27

## 2020-05-15 RX ORDER — ACETAMINOPHEN 325 MG/1
650 TABLET ORAL ONCE
Status: CANCELLED
Start: 2020-05-27

## 2020-05-15 RX ADMIN — INFLIXIMAB 700 MG: 100 INJECTION, POWDER, LYOPHILIZED, FOR SOLUTION INTRAVENOUS at 13:29

## 2020-05-15 NOTE — PROGRESS NOTES
Infusion Nursing Note:  Abiola AVA Matute presents today for remicade and labs.    Patient seen by provider today: No   present during visit today: Not Applicable.    Note: N/A.    Intravenous Access:  Labs drawn without difficulty.  Peripheral IV placed.    Treatment Conditions:  Biological Infusion Checklist:  ~~~ NOTE: If the patient answers yes to any of the questions below, hold the infusion and contact ordering provider or on-call provider.    1. Have you recently had an elevated temperature, fever, chills, productive cough, coughing for 3 weeks or longer or hemoptysis, abnormal vital signs, night sweats,  chest pain or have you noticed a decrease in your appetite, unexplained weight loss or fatigue? No  2. Do you have any open wounds or new incisions? No  3. Do you have any recent or upcoming hospitalizations, surgeries or dental procedures? No  4. Do you currently have or recently have had any signs of illness or infection or are you on any antibiotics? No  5. Have you had any new, sudden or worsening abdominal pain? No  6. Have you or anyone in your household received a live vaccination in the past 4 weeks? Please note:  No live vaccines while on biologic/chemotherapy until 6 months after the last treatment.  Patient can receive the flu vaccine (shot only) and the pneumovax.  It is optimal for the patient to get these vaccines mid cycle, but they can be given at any time as long as it is not on the day of the infusion. No  7. Have you recently been diagnosed with any new nervous system diseases (ie. Multiple sclerosis, Guillain [place], seizures, neurological changes) or cancer diagnosis? No  8. Are you on any form of radiation or chemotherapy? No  9. Are you pregnant or breast feeding or do you have plans of pregnancy in the future? No  10. Have you been having any signs of worsening depression or suicidal ideations?  (benlysta only) No  11. Have there been any other new onset medical symptoms?  No        Post Infusion Assessment:  Patient tolerated infusion without incident.  Site patent and intact, free from redness, edema or discomfort.  No evidence of extravasations.  Access discontinued per protocol.       Discharge Plan:   AVS to patient via MYCHART.  Patient will return 7/10 for next appointment.   Patient discharged in stable condition accompanied by: self.  Departure Mode: Ambulatory.    Jhon Saba RN

## 2020-05-20 ENCOUNTER — OFFICE VISIT (OUTPATIENT)
Dept: SURGERY | Facility: CLINIC | Age: 55
End: 2020-05-20
Attending: INTERNAL MEDICINE
Payer: COMMERCIAL

## 2020-05-20 ENCOUNTER — ANCILLARY PROCEDURE (OUTPATIENT)
Dept: GENERAL RADIOLOGY | Facility: CLINIC | Age: 55
End: 2020-05-20
Payer: COMMERCIAL

## 2020-05-20 VITALS
SYSTOLIC BLOOD PRESSURE: 126 MMHG | TEMPERATURE: 98.3 F | RESPIRATION RATE: 18 BRPM | BODY MASS INDEX: 26.36 KG/M2 | HEART RATE: 78 BPM | DIASTOLIC BLOOD PRESSURE: 83 MMHG | OXYGEN SATURATION: 99 % | HEIGHT: 66 IN | WEIGHT: 164 LBS

## 2020-05-20 DIAGNOSIS — K60.30 ANAL FISTULA: Primary | ICD-10-CM

## 2020-05-20 DIAGNOSIS — K81.9 CHOLECYSTITIS: ICD-10-CM

## 2020-05-20 ASSESSMENT — MIFFLIN-ST. JEOR: SCORE: 1355.65

## 2020-05-20 ASSESSMENT — PAIN SCALES - GENERAL: PAINLEVEL: MODERATE PAIN (4)

## 2020-05-20 NOTE — PATIENT INSTRUCTIONS
1. Start a daily fiber supplement such as Citrucel or Metamucil. Start with once a day and slowly increase up to three times a day, if needed, over the next 4-6 weeks    2. Calmoseptine cream as needed    3. For any worsening perianal symptoms would recommend a 3T MRI of your pelvis and could discuss replacing seton

## 2020-05-20 NOTE — PROGRESS NOTES
"Colon and Rectal Surgery Consult Clinic Note    Date: 2020     Referring provider:  Farhan Schilling MD  [address] [place] [id number]     RE: Abiola Matute  : 1965  MICHELLE: 2020    Abiola Matute is a very pleasant 54 year old female with perianal Crohn's disease diagnosed about 1 year ago who presents today to transfer care and as her seton has come out.    [place] had a seton placed on 3/28/2019 with Dr. Diane Fleming for an anterior midline anal fistula.  Yesterday, the seton fell out.  She has some mild discomfort and only rare drainage.  She is currently on infliximab and follows with Sheldon Serrato of gastroenterology and is having only 1 or 2 bowel movements a day.  Her last colonoscopy was in December of last year.    Physical Examination:  /83 (BP Location: Left arm, Patient Position: Sitting, Cuff Size: Adult Regular)   Pulse 78   Temp 98.3  F (36.8  C) (Oral)   Resp 18   Ht 5' 6\"   Wt 164 lb   LMP 2006   SpO2 99%   BMI 26.47 kg/m    General: Alert, oriented, in no acute distress, sitting comfortably    Perianal external examination: Exam was chaperoned by Eloy Solis, EMT   Waxy perianal skin tags present circumferentially.  There is a small external fistula opening the left anterior position without any drainage or induration.  Nontender on palpation.    Assessment/Plan: 54 year old female with perianal Crohn's disease and anal fistula.  Seton fell out yesterday.  Crohn's has been otherwise well managed.  We discussed replacing the seton versus seeing how she does without it.  However, if symptoms return would recommend a 3T pelvic MRI and would consider seton replacement.  However, if perianal Crohn's disease remains fairly asymptomatic, no further intervention is necessary at this time.  We will have her contact our clinic for any worsening symptoms or with any questions or concerns. Patient's questions were answered to her stated satisfaction and she is in " agreement with this plan.    Medical history:  Past Medical History:   Diagnosis Date     Alcohol abuse      Alcoholic cirrhosis of liver with ascites      Atypical ductal hyperplasia of breast 9/10/10    ERT not recommended -left - and flat epithelial atypia-scheduled for breast biopsy 9/17/2010      Bifid uvula      Cholelithiasis      Contact perianal dermatitis and other eczema     recurrent - clobetasol      Crohn disease      Fear of flying      - gets Ativan prn.      Hypertension      IBS (irritable bowel syndrome)        Surgical history:  Past Surgical History:   Procedure Laterality Date     BIOPSY ANAL N/A 3/28/2019    anal biopsy and culure placement of seton - Dr Fleming     BIOPSY BREAST Left 09/17/2010    - scheduled with Dr. Varma      BIOPSY LIVER  2019     C APPENDECTOMY  at age 14     COLONOSCOPY  2006     COLONOSCOPY N/A 12/23/2014    Procedure: COMBINED COLONOSCOPY, SINGLE OR MULTIPLE BIOPSY/POLYPECTOMY BY BIOPSY;  Surgeon: Diane Fleming MD;  Location:  GI     COLONOSCOPY N/A 12/5/2019    Procedure: COLONOSCOPY, WITH POLYPECTOMY AND BIOPSY;  Surgeon: Farhan Schilling MD;  Location:  GI     ENDOSCOPIC RETROGRADE CHOLANGIOPANCREATOGRAM N/A 4/24/2020    Procedure: ENDOSCOPIC RETROGRADE CHOLANGIOPANCREATOGRAPHY WITH, sledge removal,sphincterotomy, stent in gallbladder and pancreatic duct stent, and balloon dilation;  Surgeon: Guru Isac Kraft MD;  Location:  OR     ESOPHAGOSCOPY, GASTROSCOPY, DUODENOSCOPY (EGD), COMBINED N/A 12/5/2019    Procedure: ESOPHAGOGASTRODUODENOSCOPY (EGD);  Surgeon: Farhan Schilling MD;  Location:  GI     EXAM UNDER ANESTHESIA ANUS N/A 3/28/2019    Procedure: EXAM UNDER ANESTHESIA ANUS;  Surgeon: Diane Fleming MD;  Location:  OR     HYSTERECTOMY, VAGINAL  2006    with Dr. Licha Zhou - with BSO for fibroids      IR GALLBLADDER DRAIN PLACEMENT  4/22/2020     OPEN REDUCTION INTERNAL FIXATION ANKLE Left at age 27     plates and screws removed at age 36     Pelviscopy with removal of bilateral hydrosalpinges.  04/15/2010       Problem list:  Patient Active Problem List    Diagnosis Date Noted     Alcoholic fatty liver 05/14/2014     Priority: High     Acquired hyperbilirubinemia- ? secondary to alcohol use 05/14/2014     Priority: High     Atypical ductal hyperplasia of left breast 10/04/2010     Priority: High     Cholecystitis 10/22/2019     Priority: Medium     Alcoholic cirrhosis of liver with ascites (H) - seeing [facility]  10/22/2019     Priority: Medium     Biliary colic 10/21/2019     Priority: Medium     Severe Perianal Crohn's Disease 07/23/2019     Priority: Medium     Stool incontinence 09/30/2014     Priority: Medium     CARDIOVASCULAR SCREENING; LDL GOAL LESS THAN 130 09/30/2014     Priority: Medium     Gastroenteritis 05/20/2014     Priority: Medium     Diffuse nodular cirrhosis of liver (H) 05/20/2014     Priority: Medium     CT abdomen from 2/2019   IMPRESSION:  1. Cirrhotic liver with evidence of portal venous hypertension,  including gastroesophageal varices and probable hemorrhoids. The  spleen size is upper normal. No ascites.  2. Cholelithiasis.       AA (alcohol abuse) - ? ongoing  05/14/2014     Priority: Medium     Per hospital d/c summary 5/2014: Alcoholic cirrhosis of liver with small ascites and ultrasound consistent with liver nodule.  She had elevated LFTs and was higher in 2013.  Her hepatitis C and B antigens are negative and her hepatitis A antigen and antibody also was negative.  The patient had a long history of alcohol use.  She drinks about 4 drinks every other day or every day and the patient was advised alcohol cessation and was explained that her liver was damaged although her LFTs are decreased from levels of 09/2013 but I think she already developed cirrhosis of the liver and without stopping alcohol she will progress to full blown cirrhosis of the liver with portal hypertension and  understand the consequences and patient understood she needs to go to AA and alcohol treatment.  The patient was placed with alcohol withdrawal protocol with Ativan started with vitamins during her admission here.  The patient understood the consequence of continued alcohol use.         Essential hypertension with goal blood pressure less than 140/90 06/18/2013     Priority: Medium     Abnormal liver enzymes- ? related to ongoing etoh use/abuse 10/12/2012     Priority: Medium     S/P total hysterectomy and bilateral salpingo-oophorectomy 09/26/2012     Priority: Medium     Claustrophobia 09/19/2011     Priority: Medium     Postmenopausal- s/p hysterectomy with BSO - not on HRT secondary to atypical ductal hyperplasia & breast pain -no paps needed 08/17/2011     Priority: Medium     Idiopathic thrombocytopenia (H) 03/01/2011     Priority: Medium     Rosacea 08/24/2010     Priority: Medium     Iron deficiency anemia, unspecified iron deficiency anemia type 05/08/2006     Priority: Medium     Problem list name updated by automated process. Provider to review       Intestinal infection due to Clostridium difficile 03/09/2006     Priority: Medium     Other isolated or specific phobias 12/06/2005     Priority: Medium     fear of flying - gets Ativan prn.        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 Provider to review and confirm.       Contact dermatitis and other eczema, due to unspecified cause 12/06/2005     Priority: Medium     perianal  - recurrent - clobetasol        Non morbid obesity due to excess calories 12/23/2003     Priority: Medium     Problem list name updated by automated process. Provider to review         Medications:  Current Outpatient Medications   Medication Sig Dispense Refill     acetaminophen (TYLENOL) 325 MG tablet Take 1-2 tablets (325-650 mg) by mouth every 6 hours as needed for mild pain Take either 325mg every 6  hours or 650mg every 8 hours for pain. Do not exceed 2000mg in 24 hours. 100 tablet 0     furosemide (LASIX) 40 MG tablet Take 1 tablet (40 mg) by mouth daily 30 tablet 3     Milk Thistle-Dand-Fennel-Licor (MILK THISTLE XTRA) CAPS capsule Take 1 capsule by mouth daily       Multiple Vitamins-Minerals (MULTIVITAMIN & MINERAL PO) Take  by mouth daily.       ondansetron (ZOFRAN-ODT) 4 MG ODT tab Take 1 tablet (4 mg) by mouth every 8 hours as needed for nausea 12 tablet 0     oxyCODONE (ROXICODONE) 5 MG tablet Take 1 tablet (5 mg) by mouth every 6 hours as needed for breakthrough pain 12 tablet 0     polyethylene glycol (MIRALAX) 17 g packet Take 17 g by mouth daily as needed (to prevent constipation while taking narcotic pain medication) 24 packet 0     senna-docusate (SENOKOT-S/PERICOLACE) 8.6-50 MG tablet Take 1 tablet by mouth 2 times daily as needed (to prevent constipation while taking narcotic pain medication) 20 tablet 0     spironolactone (ALDACTONE) 100 MG tablet Take 1 tablet (100 mg) by mouth daily 30 tablet 3       Allergies:  Allergies   Allergen Reactions     Fish Oil      Redness and itching around eye area only - went away when fish oil capsules stopped      Metronidazole      pain/itching     Naphthalenemethylamines      Lamisil = mild urticarial reaction     Penicillins Cramps     Vomiting     Ppd [Tuberculin Purified Protein Derivative]      Sulfa Drugs      hives       Family history:  Family History   Problem Relation Age of Onset     Breast Cancer Mother      Gastrointestinal Disease Mother      Heart Disease Father 57     Heart Disease Paternal Grandfather      Hypertension Maternal Grandmother      Diabetes Paternal Grandmother      Diabetes Maternal Grandfather      Cancer Sister        Social history:  Social History     Tobacco Use     Smoking status: Never Smoker     Smokeless tobacco: Never Used   Substance Use Topics     Alcohol use: Yes     Alcohol/week: 0.0 standard drinks     Comment:  "occasional    Marital status: .    Nursing Notes:   Lance Solis, EMT  5/20/2020  1:28 PM  Signed  Chief Complaint   Patient presents with     Consult     Pt here for consult for Perianal crohns       Vitals:    05/20/20 1324   BP: 126/83   BP Location: Left arm   Patient Position: Sitting   Cuff Size: Adult Regular   Pulse: 78   Resp: 18   Temp: 98.3  F (36.8  C)   TempSrc: Oral   SpO2: 99%   Weight: 74.4 kg (164 lb)   Height: 1.676 m (5' 6\")       Body mass index is 26.47 kg/m .      TESSA Lucas NREMT                       Total face to face time was 20 minutes, >50% counseling.    DARYN Daly, NP-C  Colon and Rectal Surgery   [facility]    This note was created using speech recognition software and may contain unintended word substitutions.    "

## 2020-05-20 NOTE — NURSING NOTE
"Chief Complaint   Patient presents with     Consult     Pt here for consult for Perianal crohns       Vitals:    05/20/20 1324   BP: 126/83   BP Location: Left arm   Patient Position: Sitting   Cuff Size: Adult Regular   Pulse: 78   Resp: 18   Temp: 98.3  F (36.8  C)   TempSrc: Oral   SpO2: 99%   Weight: 74.4 kg (164 lb)   Height: 1.676 m (5' 6\")       Body mass index is 26.47 kg/m .      TESSA LucasT                      "

## 2020-05-20 NOTE — LETTER
"2020       RE: Abiola Matute  [address] [facility] [address]     Dear Colleague,    Thank you for referring your patient, Abiola Matute, to the [facility] COLON AND RECTAL SURGERY at [facility]. Please see a copy of my visit note below.    Colon and Rectal Surgery Consult Clinic Note    Date: 2020     Referring provider:  Farhan Schilling MD  [address] [place] [address]     RE: Abiola Matute  : 1965  MICHELLE: 2020    Abiola Matute is a very pleasant 54 year old female with perianal Crohn's disease diagnosed about 1 year ago who presents today to transfer care and as her seton has come out.    [place] had a seton placed on 3/28/2019 with Dr. Diane Fleming for an anterior midline anal fistula.  Yesterday, the seton fell out.  She has some mild discomfort and only rare drainage.  She is currently on infliximab and follows with Sheldon Serrato of gastroenterology and is having only 1 or 2 bowel movements a day.  Her last colonoscopy was in December of last year.    Physical Examination:  /83 (BP Location: Left arm, Patient Position: Sitting, Cuff Size: Adult Regular)   Pulse 78   Temp 98.3  F (36.8  C) (Oral)   Resp 18   Ht 5' 6\"   Wt 164 lb   LMP 2006   SpO2 99%   BMI 26.47 kg/m    General: Alert, oriented, in no acute distress, sitting comfortably    Perianal external examination: Exam was chaperoned by Eloy Solis, EMT   Waxy perianal skin tags present circumferentially.  There is a small external fistula opening the left anterior position without any drainage or induration.  Nontender on palpation.    Assessment/Plan: 54 year old female with perianal Crohn's disease and anal fistula.  Seton fell out yesterday.  Crohn's has been otherwise well managed.  We discussed replacing the seton versus seeing how she does without it.  However, if symptoms return would recommend a 3T pelvic MRI and would consider seton " replacement.  However, if perianal Crohn's disease remains fairly asymptomatic, no further intervention is necessary at this time.  We will have her contact our clinic for any worsening symptoms or with any questions or concerns. Patient's questions were answered to her stated satisfaction and she is in agreement with this plan.    Medical history:  Past Medical History:   Diagnosis Date     Alcohol abuse      Alcoholic cirrhosis of liver with ascites      Atypical ductal hyperplasia of breast 9/10/10    ERT not recommended -left - and flat epithelial atypia-scheduled for breast biopsy 9/17/2010      Bifid uvula      Cholelithiasis      Contact perianal dermatitis and other eczema     recurrent - clobetasol      Crohn disease      Fear of flying      - gets Ativan prn.      Hypertension      IBS (irritable bowel syndrome)        Surgical history:  Past Surgical History:   Procedure Laterality Date     BIOPSY ANAL N/A 3/28/2019    anal biopsy and culure placement of seton - Dr Fleming     BIOPSY BREAST Left 09/17/2010    - scheduled with Dr. Varma      BIOPSY LIVER  2019     C APPENDECTOMY  at age 14     COLONOSCOPY  2006     COLONOSCOPY N/A 12/23/2014    Procedure: COMBINED COLONOSCOPY, SINGLE OR MULTIPLE BIOPSY/POLYPECTOMY BY BIOPSY;  Surgeon: Diane Fleming MD;  Location:  GI     COLONOSCOPY N/A 12/5/2019    Procedure: COLONOSCOPY, WITH POLYPECTOMY AND BIOPSY;  Surgeon: Farhan Schilling MD;  Location:  GI     ENDOSCOPIC RETROGRADE CHOLANGIOPANCREATOGRAM N/A 4/24/2020    Procedure: ENDOSCOPIC RETROGRADE CHOLANGIOPANCREATOGRAPHY WITH, sledge removal,sphincterotomy, stent in gallbladder and pancreatic duct stent, and balloon dilation;  Surgeon: Guru Isac Kraft MD;  Location: [facility]     ESOPHAGOSCOPY, GASTROSCOPY, DUODENOSCOPY (EGD), COMBINED N/A 12/5/2019    Procedure: ESOPHAGOGASTRODUODENOSCOPY (EGD);  Surgeon: Farhan Schilling MD;  Location: [facility]     EXAM UNDER  ANESTHESIA ANUS N/A 3/28/2019    Procedure: EXAM UNDER ANESTHESIA ANUS;  Surgeon: Diane Fleming MD;  Location: [facility] OR     HYSTERECTOMY, VAGINAL  2006    with Dr. Licha Zhou - with BSO for fibroids      IR GALLBLADDER DRAIN PLACEMENT  4/22/2020     OPEN REDUCTION INTERNAL FIXATION ANKLE Left at age 27    plates and screws removed at age 36     Pelviscopy with removal of bilateral hydrosalpinges.  04/15/2010       Problem list:  Patient Active Problem List    Diagnosis Date Noted     Alcoholic fatty liver 05/14/2014     Priority: High     Acquired hyperbilirubinemia- ? secondary to alcohol use 05/14/2014     Priority: High     Atypical ductal hyperplasia of left breast 10/04/2010     Priority: High     Cholecystitis 10/22/2019     Priority: Medium     Alcoholic cirrhosis of liver with ascites (H) - seeing [facility]  10/22/2019     Priority: Medium     Biliary colic 10/21/2019     Priority: Medium     Severe Perianal Crohn's Disease 07/23/2019     Priority: Medium     Stool incontinence 09/30/2014     Priority: Medium     CARDIOVASCULAR SCREENING; LDL GOAL LESS THAN 130 09/30/2014     Priority: Medium     Gastroenteritis 05/20/2014     Priority: Medium     Diffuse nodular cirrhosis of liver (H) 05/20/2014     Priority: Medium     CT abdomen from 2/2019   IMPRESSION:  1. Cirrhotic liver with evidence of portal venous hypertension,  including gastroesophageal varices and probable hemorrhoids. The  spleen size is upper normal. No ascites.  2. Cholelithiasis.       AA (alcohol abuse) - ? ongoing  05/14/2014     Priority: Medium     Per hospital d/c summary 5/2014: Alcoholic cirrhosis of liver with small ascites and ultrasound consistent with liver nodule.  She had elevated LFTs and was higher in 2013.  Her hepatitis C and B antigens are negative and her hepatitis A antigen and antibody also was negative.  The patient had a long history of alcohol use.  She drinks about 4 drinks every other day or every day and the  patient was advised alcohol cessation and was explained that her liver was damaged although her LFTs are decreased from levels of 09/2013 but I think she already developed cirrhosis of the liver and without stopping alcohol she will progress to full blown cirrhosis of the liver with portal hypertension and understand the consequences and patient understood she needs to go to AA and alcohol treatment.  The patient was placed with alcohol withdrawal protocol with Ativan started with vitamins during her admission here.  The patient understood the consequence of continued alcohol use.         Essential hypertension with goal blood pressure less than 140/90 06/18/2013     Priority: Medium     Abnormal liver enzymes- ? related to ongoing etoh use/abuse 10/12/2012     Priority: Medium     S/P total hysterectomy and bilateral salpingo-oophorectomy 09/26/2012     Priority: Medium     Claustrophobia 09/19/2011     Priority: Medium     Postmenopausal- s/p hysterectomy with BSO - not on HRT secondary to atypical ductal hyperplasia & breast pain -no paps needed 08/17/2011     Priority: Medium     Idiopathic thrombocytopenia (H) 03/01/2011     Priority: Medium     Rosacea 08/24/2010     Priority: Medium     Iron deficiency anemia, unspecified iron deficiency anemia type 05/08/2006     Priority: Medium     Problem list name updated by automated process. Provider to review       Intestinal infection due to Clostridium difficile 03/09/2006     Priority: Medium     Other isolated or specific phobias 12/06/2005     Priority: Medium     fear of flying - gets Ativan prn.        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 Provider to review and confirm.       Contact dermatitis and other eczema, due to unspecified cause 12/06/2005     Priority: Medium     perianal  - recurrent - clobetasol        Non morbid obesity due to excess calories 12/23/2003     Priority:  Medium     Problem list name updated by automated process. Provider to review         Medications:  Current Outpatient Medications   Medication Sig Dispense Refill     acetaminophen (TYLENOL) 325 MG tablet Take 1-2 tablets (325-650 mg) by mouth every 6 hours as needed for mild pain Take either 325mg every 6 hours or 650mg every 8 hours for pain. Do not exceed 2000mg in 24 hours. 100 tablet 0     furosemide (LASIX) 40 MG tablet Take 1 tablet (40 mg) by mouth daily 30 tablet 3     Milk Thistle-Dand-Fennel-Licor (MILK THISTLE XTRA) CAPS capsule Take 1 capsule by mouth daily       Multiple Vitamins-Minerals (MULTIVITAMIN & MINERAL PO) Take  by mouth daily.       ondansetron (ZOFRAN-ODT) 4 MG ODT tab Take 1 tablet (4 mg) by mouth every 8 hours as needed for nausea 12 tablet 0     oxyCODONE (ROXICODONE) 5 MG tablet Take 1 tablet (5 mg) by mouth every 6 hours as needed for breakthrough pain 12 tablet 0     polyethylene glycol (MIRALAX) 17 g packet Take 17 g by mouth daily as needed (to prevent constipation while taking narcotic pain medication) 24 packet 0     senna-docusate (SENOKOT-S/PERICOLACE) 8.6-50 MG tablet Take 1 tablet by mouth 2 times daily as needed (to prevent constipation while taking narcotic pain medication) 20 tablet 0     spironolactone (ALDACTONE) 100 MG tablet Take 1 tablet (100 mg) by mouth daily 30 tablet 3       Allergies:  Allergies   Allergen Reactions     Fish Oil      Redness and itching around eye area only - went away when fish oil capsules stopped      Metronidazole      pain/itching     Naphthalenemethylamines      Lamisil = mild urticarial reaction     Penicillins Cramps     Vomiting     Ppd [Tuberculin Purified Protein Derivative]      Sulfa Drugs      hives       Family history:  Family History   Problem Relation Age of Onset     Breast Cancer Mother      Gastrointestinal Disease Mother      Heart Disease Father 57     Heart Disease Paternal Grandfather      Hypertension Maternal  "Grandmother      Diabetes Paternal Grandmother      Diabetes Maternal Grandfather      Cancer Sister        Social history:  Social History     Tobacco Use     Smoking status: Never Smoker     Smokeless tobacco: Never Used   Substance Use Topics     Alcohol use: Yes     Alcohol/week: 0.0 standard drinks     Comment: occasional    Marital status: .    Nursing Notes:   Lance Solis, EMT  5/20/2020  1:28 PM  Signed  Chief Complaint   Patient presents with     Consult     Pt here for consult for Perianal crohns       Vitals:    05/20/20 1324   BP: 126/83   BP Location: Left arm   Patient Position: Sitting   Cuff Size: Adult Regular   Pulse: 78   Resp: 18   Temp: 98.3  F (36.8  C)   TempSrc: Oral   SpO2: 99%   Weight: 74.4 kg (164 lb)   Height: 1.676 m (5' 6\")       Body mass index is 26.47 kg/m .      TESSA Lucas NREMT                       Total face to face time was 20 minutes, >50% counseling.    DARYN Ulloa, NP-C  Colon and Rectal Surgery   [facility]    This note was created using speech recognition software and may contain unintended word substitutions.      Again, thank you for allowing me to participate in the care of your patient.      Sincerely,    DARYN Ulloa CNP      "

## 2020-06-30 DIAGNOSIS — K74.60 DECOMPENSATION OF CIRRHOSIS OF LIVER (H): ICD-10-CM

## 2020-06-30 DIAGNOSIS — K72.90 DECOMPENSATION OF CIRRHOSIS OF LIVER (H): ICD-10-CM

## 2020-06-30 RX ORDER — SPIRONOLACTONE 100 MG/1
TABLET, FILM COATED ORAL
Qty: 30 TABLET | Refills: 3 | Status: SHIPPED | OUTPATIENT
Start: 2020-06-30 | End: 2020-09-15

## 2020-06-30 RX ORDER — FUROSEMIDE 40 MG
TABLET ORAL
Qty: 30 TABLET | Refills: 3 | Status: SHIPPED | OUTPATIENT
Start: 2020-06-30 | End: 2020-09-15

## 2020-07-10 ENCOUNTER — INFUSION THERAPY VISIT (OUTPATIENT)
Dept: INFUSION THERAPY | Facility: CLINIC | Age: 55
End: 2020-07-10
Attending: INTERNAL MEDICINE
Payer: COMMERCIAL

## 2020-07-10 VITALS
SYSTOLIC BLOOD PRESSURE: 108 MMHG | WEIGHT: 171 LBS | OXYGEN SATURATION: 98 % | RESPIRATION RATE: 20 BRPM | HEART RATE: 81 BPM | BODY MASS INDEX: 27.6 KG/M2 | TEMPERATURE: 98.4 F | DIASTOLIC BLOOD PRESSURE: 65 MMHG

## 2020-07-10 DIAGNOSIS — K50.119 CROHN'S DISEASE OF PERIANAL REGION WITH COMPLICATION (H): Primary | ICD-10-CM

## 2020-07-10 PROCEDURE — 96413 CHEMO IV INFUSION 1 HR: CPT

## 2020-07-10 PROCEDURE — 25800030 ZZH RX IP 258 OP 636: Performed by: INTERNAL MEDICINE

## 2020-07-10 PROCEDURE — 25000128 H RX IP 250 OP 636: Performed by: INTERNAL MEDICINE

## 2020-07-10 RX ORDER — DIPHENHYDRAMINE HCL 25 MG
25 CAPSULE ORAL ONCE
Status: CANCELLED
Start: 2020-07-22

## 2020-07-10 RX ORDER — ACETAMINOPHEN 325 MG/1
650 TABLET ORAL ONCE
Status: CANCELLED
Start: 2020-07-22

## 2020-07-10 RX ADMIN — INFLIXIMAB 800 MG: 100 INJECTION, POWDER, LYOPHILIZED, FOR SOLUTION INTRAVENOUS at 13:31

## 2020-07-10 ASSESSMENT — PAIN SCALES - GENERAL: PAINLEVEL: NO PAIN (0)

## 2020-07-10 NOTE — PROGRESS NOTES
Infusion Nursing Note:  Abiola Matute presents today for Remicade.    Patient seen by provider today: No   present during visit today: Not Applicable.    Note: Pt tolerated Remicade over 1 hour without issue.    Intravenous Access:  Peripheral IV placed.    Treatment Conditions:  Biological Infusion Checklist:  ~~~ NOTE: If the patient answers yes to any of the questions below, hold the infusion and contact ordering provider or on-call provider.    1. Have you recently had an elevated temperature, fever, chills, productive cough, coughing for 3 weeks or longer or hemoptysis, abnormal vital signs, night sweats,  chest pain or have you noticed a decrease in your appetite, unexplained weight loss or fatigue? No  2. Do you have any open wounds or new incisions? No  3. Do you have any recent or upcoming hospitalizations, surgeries or dental procedures? No  4. Do you currently have or recently have had any signs of illness or infection or are you on any antibiotics? No  5. Have you had any new, sudden or worsening abdominal pain? No  6. Have you or anyone in your household received a live vaccination in the past 4 weeks? Please note:  No live vaccines while on biologic/chemotherapy until 6 months after the last treatment.  Patient can receive the flu vaccine (shot only) and the pneumovax.  It is optimal for the patient to get these vaccines mid cycle, but they can be given at any time as long as it is not on the day of the infusion. No  7. Have you recently been diagnosed with any new nervous system diseases (ie. Multiple sclerosis, Guillain [place], seizures, neurological changes) or cancer diagnosis? No  8. Are you on any form of radiation or chemotherapy? No  9. Are you pregnant or breast feeding or do you have plans of pregnancy in the future? No  10. Have you been having any signs of worsening depression or suicidal ideations?  (benlysta only) No  11. Have there been any other new onset medical symptoms?  No    Post Infusion Assessment:  Patient tolerated infusion without incident.  Blood return noted pre and post infusion.  Site patent and intact, free from redness, edema or discomfort.  No evidence of extravasations.  Access discontinued per protocol.       Discharge Plan:   Patient declined prescription refills.  Discharge instructions reviewed with: Patient.  Patient verbalized understanding of discharge instructions and all questions answered.  AVS to patient via NestioHART.  Patient will return as scheduled for next appointment.   Patient discharged in stable condition accompanied by: self.  Departure Mode: Ambulatory.    Susan Lee RN

## 2020-07-15 LAB — INFLIXIMAB LEVEL: NORMAL

## 2020-09-01 ENCOUNTER — TELEPHONE (OUTPATIENT)
Dept: GASTROENTEROLOGY | Facility: CLINIC | Age: 55
End: 2020-09-01

## 2020-09-01 NOTE — TELEPHONE ENCOUNTER
Ok to get lab work on Friday.  Patient is also due for follow up visit with Dr. Cervantes. Will have schedulers call to set up virtual visit.       Talia GARCIA LPN  Hepatology Clinic    [facility]    Phone Message    May a detailed message be left on voicemail: yes     Reason for Call: pt is requesting a call back from Dr. Jeannette Cervantes/Care Team asap to let pt know IF it would be okay for pt to either get a full set of bloodwork (fasting labs) tomorrow OR can she wait until Friday, 9/04 when she has her physical appt at [facility] so that pt only has to do one full set of labs this week?  Please call pt asap. Thank you.      Action Taken: Message routed to:  [facility] ([facility]):  Hepatology    Travel Screening: Not Applicable

## 2020-09-01 NOTE — PROGRESS NOTES
Pre-Visit Planning    Chart review: Last seen by Joyce Kapadia CNP for a pre-op prior to colonoscopy, EGD and variceal screening related to Crohn's and cirrhosis of the liver. Procedure completed on 12/5/20.    Admitted to the hospital on 4/21/20 for acute cholecystitis - ERCP performed.    Follows regularly with gastroenterology for treatment of Crohn's and alcoholic liver cirrhosis. Receives monthly Remicade infusions for Crohn's.    Future Appointments   Date Time Provider Department Center   9/3/2020 12:30 PM  INFUSION CHAIR [address] [place] [address]   9/4/2020  1:30 PM Linda Macdonald APRN CNP [facility]     Arrival Time for this Appointment:  1:05 PM   Appointment Notes for this encounter:   physical--has been more than 1 year    Questionnaires Reviewed/Assigned  No additional questionnaires are needed    Dataherohart message sent to patient. Awaiting response.   Soledad Lui, KATERINAN, RN  [facility]

## 2020-09-03 ENCOUNTER — INFUSION THERAPY VISIT (OUTPATIENT)
Dept: INFUSION THERAPY | Facility: CLINIC | Age: 55
End: 2020-09-03
Attending: INTERNAL MEDICINE
Payer: COMMERCIAL

## 2020-09-03 VITALS
SYSTOLIC BLOOD PRESSURE: 110 MMHG | BODY MASS INDEX: 28.05 KG/M2 | RESPIRATION RATE: 18 BRPM | TEMPERATURE: 98.2 F | HEART RATE: 82 BPM | WEIGHT: 173.8 LBS | DIASTOLIC BLOOD PRESSURE: 77 MMHG

## 2020-09-03 DIAGNOSIS — K50.119 CROHN'S DISEASE OF PERIANAL REGION WITH COMPLICATION (H): Primary | ICD-10-CM

## 2020-09-03 PROCEDURE — 96413 CHEMO IV INFUSION 1 HR: CPT

## 2020-09-03 PROCEDURE — 25000128 H RX IP 250 OP 636: Performed by: INTERNAL MEDICINE

## 2020-09-03 PROCEDURE — 25800030 ZZH RX IP 258 OP 636: Performed by: INTERNAL MEDICINE

## 2020-09-03 PROCEDURE — 96365 THER/PROPH/DIAG IV INF INIT: CPT

## 2020-09-03 RX ORDER — DIPHENHYDRAMINE HCL 25 MG
25 CAPSULE ORAL ONCE
Status: CANCELLED
Start: 2020-09-16

## 2020-09-03 RX ORDER — ACETAMINOPHEN 325 MG/1
650 TABLET ORAL ONCE
Status: CANCELLED
Start: 2020-09-16

## 2020-09-03 RX ADMIN — SODIUM CHLORIDE 250 ML: 9 INJECTION, SOLUTION INTRAVENOUS at 12:56

## 2020-09-03 RX ADMIN — INFLIXIMAB 800 MG: 100 INJECTION, POWDER, LYOPHILIZED, FOR SOLUTION INTRAVENOUS at 13:12

## 2020-09-03 ASSESSMENT — PAIN SCALES - GENERAL: PAINLEVEL: NO PAIN (0)

## 2020-09-03 NOTE — PROGRESS NOTES
Infusion Nursing Note:  Abiola Matute presents today for remicade.    Patient seen by provider today: No   present during visit today: Not Applicable.    Note: N/A.    Intravenous Access:  Peripheral IV placed.    Treatment Conditions:  Biological Infusion Checklist:  ~~~ NOTE: If the patient answers yes to any of the questions below, hold the infusion and contact ordering provider or on-call provider.    1. Have you recently had an elevated temperature, fever, chills, productive cough, coughing for 3 weeks or longer or hemoptysis, abnormal vital signs, night sweats,  chest pain or have you noticed a decrease in your appetite, unexplained weight loss or fatigue? No  2. Do you have any open wounds or new incisions? No  3. Do you have any recent or upcoming hospitalizations, surgeries or dental procedures? No  4. Do you currently have or recently have had any signs of illness or infection or are you on any antibiotics? No  5. Have you had any new, sudden or worsening abdominal pain? No  6. Have you or anyone in your household received a live vaccination in the past 4 weeks? Please note:  No live vaccines while on biologic/chemotherapy until 6 months after the last treatment.  Patient can receive the flu vaccine (shot only) and the pneumovax.  It is optimal for the patient to get these vaccines mid cycle, but they can be given at any time as long as it is not on the day of the infusion. No  7. Have you recently been diagnosed with any new nervous system diseases (ie. Multiple sclerosis, Guillain [place], seizures, neurological changes) or cancer diagnosis? No  8. Are you on any form of radiation or chemotherapy? No  9. Are you pregnant or breast feeding or do you have plans of pregnancy in the future? No  10. Have you been having any signs of worsening depression or suicidal ideations?  (benlysta only) No  11. Have there been any other new onset medical symptoms? No        Post Infusion Assessment:  Patient  tolerated infusion without incident.  Blood return noted pre and post infusion.  Site patent and intact, free from redness, edema or discomfort.  No evidence of extravasations.  Access discontinued per protocol.       Discharge Plan:   Discharge instructions reviewed with: Patient.  Patient and/or family verbalized understanding of discharge instructions and all questions answered.  AVS to patient via [email or web address].  Patient will return 10/29/20 for next appointment.   Patient discharged in stable condition accompanied by: self.  Departure Mode: Ambulatory.    Margaret Ibarra RN

## 2020-09-04 ENCOUNTER — TELEPHONE (OUTPATIENT)
Dept: FAMILY MEDICINE | Facility: CLINIC | Age: 55
End: 2020-09-04

## 2020-09-04 ENCOUNTER — PATIENT OUTREACH (OUTPATIENT)
Dept: CARE COORDINATION | Facility: CLINIC | Age: 55
End: 2020-09-04

## 2020-09-04 ENCOUNTER — OFFICE VISIT (OUTPATIENT)
Dept: FAMILY MEDICINE | Facility: CLINIC | Age: 55
End: 2020-09-04
Payer: COMMERCIAL

## 2020-09-04 VITALS
TEMPERATURE: 98.5 F | DIASTOLIC BLOOD PRESSURE: 80 MMHG | WEIGHT: 170.9 LBS | HEART RATE: 92 BPM | HEIGHT: 65 IN | BODY MASS INDEX: 28.47 KG/M2 | SYSTOLIC BLOOD PRESSURE: 130 MMHG | OXYGEN SATURATION: 99 %

## 2020-09-04 DIAGNOSIS — Z13.220 SCREENING FOR LIPID DISORDERS: ICD-10-CM

## 2020-09-04 DIAGNOSIS — Z00.00 ROUTINE HISTORY AND PHYSICAL EXAMINATION OF ADULT: Primary | ICD-10-CM

## 2020-09-04 DIAGNOSIS — Z23 NEED FOR PNEUMOCOCCAL VACCINATION: ICD-10-CM

## 2020-09-04 DIAGNOSIS — Z23 NEED FOR PROPHYLACTIC VACCINATION AND INOCULATION AGAINST INFLUENZA: ICD-10-CM

## 2020-09-04 DIAGNOSIS — I10 ESSENTIAL HYPERTENSION WITH GOAL BLOOD PRESSURE LESS THAN 140/90: ICD-10-CM

## 2020-09-04 DIAGNOSIS — Z12.31 ENCOUNTER FOR SCREENING MAMMOGRAM FOR BREAST CANCER: ICD-10-CM

## 2020-09-04 DIAGNOSIS — K50.119 CROHN'S DISEASE OF PERIANAL REGION WITH COMPLICATION (H): ICD-10-CM

## 2020-09-04 LAB
ERYTHROCYTE [DISTWIDTH] IN BLOOD BY AUTOMATED COUNT: 13.8 % (ref 10–15)
HCT VFR BLD AUTO: 40.7 % (ref 35–47)
HGB BLD-MCNC: 13.9 G/DL (ref 11.7–15.7)
MCH RBC QN AUTO: 34.9 PG (ref 26.5–33)
MCHC RBC AUTO-ENTMCNC: 34.2 G/DL (ref 31.5–36.5)
MCV RBC AUTO: 102 FL (ref 78–100)
PLATELET # BLD AUTO: 100 10E9/L (ref 150–450)
RBC # BLD AUTO: 3.98 10E12/L (ref 3.8–5.2)
WBC # BLD AUTO: 4 10E9/L (ref 4–11)

## 2020-09-04 PROCEDURE — 80061 LIPID PANEL: CPT | Performed by: NURSE PRACTITIONER

## 2020-09-04 PROCEDURE — 85027 COMPLETE CBC AUTOMATED: CPT | Performed by: NURSE PRACTITIONER

## 2020-09-04 PROCEDURE — 90472 IMMUNIZATION ADMIN EACH ADD: CPT | Performed by: NURSE PRACTITIONER

## 2020-09-04 PROCEDURE — 99396 PREV VISIT EST AGE 40-64: CPT | Mod: 25 | Performed by: NURSE PRACTITIONER

## 2020-09-04 PROCEDURE — 90471 IMMUNIZATION ADMIN: CPT | Performed by: NURSE PRACTITIONER

## 2020-09-04 PROCEDURE — 82043 UR ALBUMIN QUANTITATIVE: CPT | Performed by: NURSE PRACTITIONER

## 2020-09-04 PROCEDURE — 99214 OFFICE O/P EST MOD 30 MIN: CPT | Mod: 25 | Performed by: NURSE PRACTITIONER

## 2020-09-04 PROCEDURE — 90715 TDAP VACCINE 7 YRS/> IM: CPT | Performed by: NURSE PRACTITIONER

## 2020-09-04 PROCEDURE — 90686 IIV4 VACC NO PRSV 0.5 ML IM: CPT | Performed by: NURSE PRACTITIONER

## 2020-09-04 PROCEDURE — 80053 COMPREHEN METABOLIC PANEL: CPT | Performed by: NURSE PRACTITIONER

## 2020-09-04 PROCEDURE — 90732 PPSV23 VACC 2 YRS+ SUBQ/IM: CPT | Performed by: NURSE PRACTITIONER

## 2020-09-04 PROCEDURE — 36415 COLL VENOUS BLD VENIPUNCTURE: CPT | Performed by: NURSE PRACTITIONER

## 2020-09-04 ASSESSMENT — MIFFLIN-ST. JEOR: SCORE: 1371.69

## 2020-09-04 NOTE — TELEPHONE ENCOUNTER
Date Forms was received: September 4, 2020    Forms received by: Patient Drop Off    Last office visit: 9/4/2020    Purpose of Form:  biometric    When the form is due:  ASAP    How the form needs to be returned for patient:  Fax    Form currently placed  TC inbox pending labs

## 2020-09-04 NOTE — PROGRESS NOTES
Clinic Care Coordination Contact  [facility]/Voicemail    Referral Source: Care Team  Clinical Data: Care Coordinator Outreach  -Received referral from Care team requesting CC to outreach to Patient to support with Crohn's disease illness resulting in ability to work and questions regarding disability.     Outreach attempted x 1.  Left message on patient's voicemail with call back information and requested return call.  Plan: Care Coordinator will try to reach patient again in 1-2 business days.    Mak Cerda RN Care Coordinator  [facility] Clinics: [place], [place], [place], [place]  Phone: [phone]  E-Mail: [email or web address]

## 2020-09-04 NOTE — PROGRESS NOTES
SUBJECTIVE:   CC: Abiola Matute is an 55 year old woman who presents for preventive health visit.     Healthy Habits:     Getting at least 3 servings of Calcium per day:  Yes    Bi-annual eye exam:  Yes    Dental care twice a year:  Yes    Sleep apnea or symptoms of sleep apnea:  None    Diet:  Other    Frequency of exercise:  2-3 days/week    Duration of exercise:  15-30 minutes    Taking medications regularly:  Yes    Medication side effects:  Not applicable    PHQ-2 Total Score: 0    Additional concerns today:  No      Crohn's Disease:  Is still not working due to Severe Crohn's Disease.    Pain and discomfort related to anal fistula.   Last worked 1.5 years ago.  Is wondering about resources/checking into        HTN:    Discontinued Lisinopril.  Started furosemide 40 mg and spironolactone 100 mg daily.    Stable BP.     Today's PHQ-2 Score:   PHQ-2 ( 1999 Pfizer) 9/4/2020   Q1: Little interest or pleasure in doing things 0   Q2: Feeling down, depressed or hopeless 0   PHQ-2 Score 0   Q1: Little interest or pleasure in doing things Not at all   Q2: Feeling down, depressed or hopeless Not at all   PHQ-2 Score 0       Abuse: Current or Past (Physical, Sexual or Emotional) - No  Do you feel safe in your environment? Yes    Have you ever done Advance Care Planning? (For example, a Health Directive, POLST, or a discussion with a medical provider or your loved ones about your wishes): No, advance care planning information given to patient to review.  Patient plans to discuss their wishes with loved ones or provider.      Social History     Tobacco Use     Smoking status: Never Smoker     Smokeless tobacco: Never Used   Substance Use Topics     Alcohol use: Yes     Alcohol/week: 0.0 standard drinks     Comment: occasional     If you drink alcohol do you typically have >3 drinks per day or >7 drinks per week? No    Alcohol Use 9/4/2020   Prescreen: >3 drinks/day or >7 drinks/week? No   Prescreen: >3 drinks/day or  >7 drinks/week? -   No flowsheet data found.    Reviewed orders with patient.  Reviewed health maintenance and updated orders accordingly - Yes  BP Readings from Last 3 Encounters:   09/04/20 130/80   09/03/20 110/77   07/10/20 108/65    Wt Readings from Last 3 Encounters:   09/04/20 77.5 kg (170 lb 14.4 oz)   09/03/20 78.8 kg (173 lb 12.8 oz)   07/10/20 77.6 kg (171 lb)                  Patient Active Problem List   Diagnosis     Non morbid obesity due to excess calories     Other isolated or specific phobias     Other congenital malformations of mouth     Contact dermatitis and other eczema, due to unspecified cause     Intestinal infection due to Clostridium difficile     Iron deficiency anemia, unspecified iron deficiency anemia type     Rosacea     Atypical ductal hyperplasia of left breast     Idiopathic thrombocytopenia (H)     Postmenopausal- s/p hysterectomy with BSO - not on HRT secondary to atypical ductal hyperplasia & breast pain -no paps needed     Claustrophobia     S/P total hysterectomy and bilateral salpingo-oophorectomy     Abnormal liver enzymes- ? related to ongoing etoh use/abuse     Essential hypertension with goal blood pressure less than 140/90     Gastroenteritis     Stool incontinence     CARDIOVASCULAR SCREENING; LDL GOAL LESS THAN 130     AA (alcohol abuse) - ? ongoing      Alcoholic fatty liver     Acquired hyperbilirubinemia- ? secondary to alcohol use     Diffuse nodular cirrhosis of liver (H)     Severe Perianal Crohn's Disease     Biliary colic     Cholecystitis     Alcoholic cirrhosis of liver with ascites (H) - [facility]      Past Surgical History:   Procedure Laterality Date     BIOPSY ANAL N/A 3/28/2019    anal biopsy and culure placement of seton - Dr Fleming     BIOPSY BREAST Left 09/17/2010    - scheduled with Dr. Varma      BIOPSY LIVER  2019     C APPENDECTOMY  at age 14     COLONOSCOPY  2006     COLONOSCOPY N/A 12/23/2014    Procedure: COMBINED COLONOSCOPY, SINGLE OR  MULTIPLE BIOPSY/POLYPECTOMY BY BIOPSY;  Surgeon: Diane Fleming MD;  Location:  GI     COLONOSCOPY N/A 12/5/2019    Procedure: COLONOSCOPY, WITH POLYPECTOMY AND BIOPSY;  Surgeon: Farhan Schilling MD;  Location:  GI     ENDOSCOPIC RETROGRADE CHOLANGIOPANCREATOGRAM N/A 4/24/2020    Procedure: ENDOSCOPIC RETROGRADE CHOLANGIOPANCREATOGRAPHY WITH, sledge removal,sphincterotomy, stent in gallbladder and pancreatic duct stent, and balloon dilation;  Surgeon: Guru Isac Kraft MD;  Location:  OR     ESOPHAGOSCOPY, GASTROSCOPY, DUODENOSCOPY (EGD), COMBINED N/A 12/5/2019    Procedure: ESOPHAGOGASTRODUODENOSCOPY (EGD);  Surgeon: Farhan Schilling MD;  Location:  GI     EXAM UNDER ANESTHESIA ANUS N/A 3/28/2019    Procedure: EXAM UNDER ANESTHESIA ANUS;  Surgeon: Diane Fleming MD;  Location:  OR     HYSTERECTOMY, VAGINAL  2006    with Dr. Licha Zhou - with BSO for fibroids      IR GALLBLADDER DRAIN PLACEMENT  4/22/2020     OPEN REDUCTION INTERNAL FIXATION ANKLE Left at age 27    plates and screws removed at age 36     Pelviscopy with removal of bilateral hydrosalpinges.  04/15/2010       Social History     Tobacco Use     Smoking status: Never Smoker     Smokeless tobacco: Never Used   Substance Use Topics     Alcohol use: Yes     Alcohol/week: 0.0 standard drinks     Comment: occasional     Family History   Problem Relation Age of Onset     Breast Cancer Mother      Gastrointestinal Disease Mother      Heart Disease Father 57     Heart Disease Paternal Grandfather      Hypertension Maternal Grandmother      Diabetes Paternal Grandmother      Diabetes Maternal Grandfather      Cancer Sister          Current Outpatient Medications   Medication Sig Dispense Refill     acetaminophen (TYLENOL) 325 MG tablet Take 1-2 tablets (325-650 mg) by mouth every 6 hours as needed for mild pain Take either 325mg every 6 hours or 650mg every 8 hours for pain. Do not exceed 2000mg in 24 hours.  100 tablet 0     furosemide (LASIX) 40 MG tablet TAKE 1 TABLET BY MOUTH  DAILY 30 tablet 3     Milk Thistle-Dand-Fennel-Licor (MILK THISTLE XTRA) CAPS capsule Take 1 capsule by mouth daily       Multiple Vitamins-Minerals (MULTIVITAMIN & MINERAL PO) Take  by mouth daily.       spironolactone (ALDACTONE) 100 MG tablet TAKE 1 TABLET BY MOUTH  DAILY 30 tablet 3       Mammogram Screening: Patient over age 50, mutual decision to screen reflected in health maintenance.    Pertinent mammograms are reviewed under the imaging tab.  History of abnormal Pap smear: Status post benign hysterectomy. Health Maintenance and Surgical History updated.  PAP / HPV 9/19/2011 8/24/2010 12/6/2005   PAP NIL ASC-US(A) ASC-US(A)     Reviewed and updated as needed this visit by clinical staff  Tobacco  Allergies  Med Hx  Surg Hx  Fam Hx  Soc Hx        Reviewed and updated as needed this visit by Provider        Past Medical History:   Diagnosis Date     Alcohol abuse      Alcoholic cirrhosis of liver with ascites      Atypical ductal hyperplasia of breast 9/10/10    ERT not recommended -left - and flat epithelial atypia-scheduled for breast biopsy 9/17/2010      Bifid uvula      Cholelithiasis      Contact perianal dermatitis and other eczema     recurrent - clobetasol      Crohn disease      Fear of flying      - gets Ativan prn.      Hypertension      IBS (irritable bowel syndrome)       Past Surgical History:   Procedure Laterality Date     BIOPSY ANAL N/A 3/28/2019    anal biopsy and culure placement of seton - Dr Fleming     BIOPSY BREAST Left 09/17/2010    - scheduled with Dr. Varma      BIOPSY LIVER  2019     C APPENDECTOMY  at age 14     COLONOSCOPY  2006     COLONOSCOPY N/A 12/23/2014    Procedure: COMBINED COLONOSCOPY, SINGLE OR MULTIPLE BIOPSY/POLYPECTOMY BY BIOPSY;  Surgeon: Diane Fleming MD;  Location:  GI     COLONOSCOPY N/A 12/5/2019    Procedure: COLONOSCOPY, WITH POLYPECTOMY AND BIOPSY;  Surgeon: Shakir  "Farhan Butler MD;  Location: [facility]     ENDOSCOPIC RETROGRADE CHOLANGIOPANCREATOGRAM N/A 4/24/2020    Procedure: ENDOSCOPIC RETROGRADE CHOLANGIOPANCREATOGRAPHY WITH, sledge removal,sphincterotomy, stent in gallbladder and pancreatic duct stent, and balloon dilation;  Surgeon: Guru Isac Kraft MD;  Location: [facility] OR     ESOPHAGOSCOPY, GASTROSCOPY, DUODENOSCOPY (EGD), COMBINED N/A 12/5/2019    Procedure: ESOPHAGOGASTRODUODENOSCOPY (EGD);  Surgeon: Farhan Schilling MD;  Location: [facility] GI     EXAM UNDER ANESTHESIA ANUS N/A 3/28/2019    Procedure: EXAM UNDER ANESTHESIA ANUS;  Surgeon: Diane Fleming MD;  Location:  OR     HYSTERECTOMY, VAGINAL  2006    with Dr. Licha Zhou - with BSO for fibroids      IR GALLBLADDER DRAIN PLACEMENT  4/22/2020     OPEN REDUCTION INTERNAL FIXATION ANKLE Left at age 27    plates and screws removed at age 36     Pelviscopy with removal of bilateral hydrosalpinges.  04/15/2010       Review of Systems  CONSTITUTIONAL: NEGATIVE for fever, chills, change in weight  INTEGUMENTARY/SKIN: NEGATIVE for worrisome rashes, moles or lesions  EYES: NEGATIVE for vision changes or irritation  ENT: NEGATIVE for ear, mouth and throat problems  RESP: NEGATIVE for significant cough or SOB  BREAST: NEGATIVE for masses, tenderness or discharge  CV: NEGATIVE for chest pain, palpitations or peripheral edema  GI: NEGATIVE for nausea, abdominal pain, heartburn, or change in bowel habits  : NEGATIVE for unusual urinary or vaginal symptoms. No vaginal bleeding.  MUSCULOSKELETAL: NEGATIVE for significant arthralgias or myalgia  NEURO: NEGATIVE for weakness, dizziness or paresthesias  PSYCHIATRIC: NEGATIVE for changes in mood or affect      OBJECTIVE:   /80   Pulse 92   Temp 98.5  F (36.9  C) (Tympanic)   Ht 1.66 m (5' 5.35\")   Wt 77.5 kg (170 lb 14.4 oz)   LMP 05/31/2006   SpO2 99%   BMI 28.13 kg/m    Physical Exam  GENERAL APPEARANCE: healthy, alert and no distress  EYES: Eyes " grossly normal to inspection, PERRL and conjunctivae and sclerae normal  HENT: ear canals and TM's normal, nose and mouth without ulcers or lesions, oropharynx clear and oral mucous membranes moist  NECK: no adenopathy, no asymmetry, masses, or scars and thyroid normal to palpation  RESP: lungs clear to auscultation - no rales, rhonchi or wheezes  CV: regular rate and rhythm, normal S1 S2, no S3 or S4, no murmur, click or rub, no peripheral edema and peripheral pulses strong  ABDOMEN: soft, nontender, no hepatosplenomegaly, no masses and bowel sounds normal  MS: no musculoskeletal defects are noted and gait is age appropriate without ataxia  SKIN: no suspicious lesions or rashes  NEURO: Normal strength and tone, sensory exam grossly normal, mentation intact and speech normal  PSYCH: mentation appears normal and affect normal/bright      ASSESSMENT/PLAN:     Abiola was seen today for physical and imm/inj.    Diagnoses and all orders for this visit:    Routine history and physical examination of adult    Encounter for screening mammogram for breast cancer  -     *MA Screening Digital Bilateral; Future    Screening for lipid disorders  -     Lipid panel reflex to direct LDL Fasting    Severe Perianal Crohn's Disease  Followed by colorectal and gastroenterology (Dr. Cervantes).    Concurrent Cirrhosis   Stable on Remicade.   -     CARE COORDINATION REFERRAL  -     CBC with platelets    Essential hypertension with goal blood pressure less than 140/90  Stable, currently on Furosemide 40 mg daily and Spironolactone 100 mg daily.    -     Albumin Random Urine Quantitative with Creat Ratio  -     Comprehensive metabolic panel (BMP + Alb, Alk Phos, ALT, AST, Total. Bili, TP)    Need for pneumococcal vaccination  -     Pneumococcal vaccine 23 valent PPSV23  (Pneumovax) [08695]  -     Vaccine Administration, Each Additional [58305]    Need for prophylactic vaccination and inoculation against influenza  -     INFLUENZA VACCINE  "IM > 6 MONTHS VALENT IIV4 [21171]  -     Vaccine Administration, Initial [52300]    Other orders  -     TDAP VACCINE (ADACEL)  -     REVIEW OF HEALTH MAINTENANCE PROTOCOL ORDERS      COUNSELING:  Reviewed preventive health counseling, as reflected in patient instructions       Regular exercise       Healthy diet/nutrition    Estimated body mass index is 28.13 kg/m  as calculated from the following:    Height as of this encounter: 1.66 m (5' 5.35\").    Weight as of this encounter: 77.5 kg (170 lb 14.4 oz).        She reports that she has never smoked. She has never used smokeless tobacco.      Counseling Resources:  ATP IV Guidelines  Pooled Cohorts Equation Calculator  Breast Cancer Risk Calculator  BRCA-Related Cancer Risk Assessment: FHS-7 Tool  FRAX Risk Assessment  ICSI Preventive Guidelines  Dietary Guidelines for Americans, 2010  USDA's MyPlate  ASA Prophylaxis  Lung CA Screening    DARYN Lucas [facility] [place]  "

## 2020-09-04 NOTE — RESULT ENCOUNTER NOTE
Dear [place],     -Normal red blood cell (hgb) levels, normal white blood cell count and platelets were slightly low (but stable to previous levels).        Please send a [employer] message or call [phone]  if you have any questions.      Linda Macdonald, DARYN, [facility] [place]    If you have further questions about the interpretation of your labs, [email or web address] is a good website to check out for further information.

## 2020-09-04 NOTE — LETTER
[place] CARE COORDINATION  No address on file    September 8, 2020    Abiola Matute  [address] [address]      Dear Abiola,    I am a clinic care coordinator who works with Physician Agata FunkPrimary at [facility]. I have been trying to reach you recently to introduce Clinic Care Coordination and to see if there was anything I could assist you with.  Below is a description of clinic care coordination and how I can further assist you.      The clinic care coordination team is made up of a registered nurse,  and community health worker who understand the health care system. The goal of clinic care coordination is to help you manage your health and improve access to the health care system in the most efficient manner. The team can assist you in meeting your health care goals by providing education, coordinating services, strengthening the communication among your providers and supporting you with any resource needs.    Please feel free to contact me at [phone] with any questions or concerns. We are focused on providing you with the highest-quality healthcare experience possible and that all starts with you.     Sincerely,     Mak Cerda RN Care Coordinator  [facility]: [place]  Phone: [phone]  E-Mail: [email or web address]

## 2020-09-05 LAB
ALBUMIN SERPL-MCNC: 3.6 G/DL (ref 3.4–5)
ALP SERPL-CCNC: 142 U/L (ref 40–150)
ALT SERPL W P-5'-P-CCNC: 46 U/L (ref 0–50)
ANION GAP SERPL CALCULATED.3IONS-SCNC: 6 MMOL/L (ref 3–14)
AST SERPL W P-5'-P-CCNC: 62 U/L (ref 0–45)
BILIRUB SERPL-MCNC: 2.1 MG/DL (ref 0.2–1.3)
BUN SERPL-MCNC: 12 MG/DL (ref 7–30)
CALCIUM SERPL-MCNC: 9.2 MG/DL (ref 8.5–10.1)
CHLORIDE SERPL-SCNC: 104 MMOL/L (ref 94–109)
CHOLEST SERPL-MCNC: 127 MG/DL
CO2 SERPL-SCNC: 25 MMOL/L (ref 20–32)
CREAT SERPL-MCNC: 0.73 MG/DL (ref 0.52–1.04)
CREAT UR-MCNC: 28 MG/DL
GFR SERPL CREATININE-BSD FRML MDRD: >90 ML/MIN/{1.73_M2}
GLUCOSE SERPL-MCNC: 91 MG/DL (ref 70–99)
HDLC SERPL-MCNC: 57 MG/DL
LDLC SERPL CALC-MCNC: 56 MG/DL
MICROALBUMIN UR-MCNC: <5 MG/L
MICROALBUMIN/CREAT UR: NORMAL MG/G CR (ref 0–25)
NONHDLC SERPL-MCNC: 70 MG/DL
POTASSIUM SERPL-SCNC: 4.5 MMOL/L (ref 3.4–5.3)
PROT SERPL-MCNC: 8.1 G/DL (ref 6.8–8.8)
SODIUM SERPL-SCNC: 135 MMOL/L (ref 133–144)
TRIGL SERPL-MCNC: 68 MG/DL

## 2020-09-08 NOTE — RESULT ENCOUNTER NOTE
Dear [place],     -Cholesterol levels (LDL,HDL, Triglycerides) are normal.  ADVISE: rechecking in 1 year.   -Liver and gallbladder tests are stable (ALT,AST, Alk phos, bilirubin), kidney function is normal (Cr, GFR), sodium is normal, potassium is normal, calcium is normal, glucose is normal.  -Microalbumin (urine protein) test is normal.  ADVISE: rechecking this annually.      Please send a [employer] message or call [phone]  if you have any questions.      Linda Macdonald, DARYN, CNP  [place] [facility] [place]    If you have further questions about the interpretation of your labs, [email or web address] is a good website to check out for further information.

## 2020-09-08 NOTE — PROGRESS NOTES
Clinic Care Coordination Contact  [facility]/Voicemail    Referral Source: Care Team  Clinical Data: Care Coordinator Outreach  Outreach attempted x 2.  Left message on patient's voicemail with call back information and requested return call.  Plan: Care Coordinator will send unable to contact letter with care coordinator contact information via [employer]. Care Coordinator will do no further outreaches at this time.    Mak Cerda RN Care Coordinator  [facility]: [place], [place]  Phone: [phone]  E-Mail: [email or web address]

## 2020-09-11 ENCOUNTER — PATIENT OUTREACH (OUTPATIENT)
Dept: CARE COORDINATION | Facility: CLINIC | Age: 55
End: 2020-09-11

## 2020-09-11 NOTE — LETTER
[place] CARE COORDINATION  No address on file    September 15, 2020    Abiola Matute  [address] [address]      Dear Abiola,    I am a clinic care coordinator who works with Physician Agata Ref-Primary at [facility], [place] and [place] [facility]. I wanted to thank you for spending the time to talk with me.  Below is a description of clinic care coordination and how I can further assist you.      The clinic care coordination team is made up of a registered nurse,  and community health worker who understand the health care system. The goal of clinic care coordination is to help you manage your health and improve access to the health care system in the most efficient manner. The team can assist you in meeting your health care goals by providing education, coordinating services, strengthening the communication among your providers and supporting you with any resource needs.    Per our discussion, I believe it is best to start by looking at the social security disability website which can be reach at: https://www.ssa.gov/benefits/forms/    If you need support with completing the application or is interested in receiving help with the application, I recommend contacting the [place] disability Hub at: 1-222.730.5817    The following two agencies also help people apply for social security:  Disability specialists: 1-785.842.6678  Quality disability services: 1-235.916.7336    Please feel free to contact me at [phone] with any questions or concerns. We are focused on providing you with the highest-quality healthcare experience possible and that all starts with you.     Sincerely,     Mak Cerda RN Care Coordinator  [facility]: [place]  Phone: [phone]  E-Mail: [email or web address]          Enclosed: I have enclosed a copy of the Complex Care Plan. This has helpful information and goals that we have talked  about. Please keep this in an easy to access place to use as needed.

## 2020-09-11 NOTE — LETTER
[facility]  Complex Care Plan  About Me:    Patient Name:  Abiola Matute    YOB: 1965  Age:         55 year old   Jean MRN:    [id number] Telephone Information:  Home Phone [phone]   Mobile [phone]       Address:  [address] [place] [address] Email address:  [email or web address]      Emergency Contact(s)    Name Relationship Lgl Grd Work Phone Home Phone Mobile Phone   1. ANDERS, JOON Spouse   [phone]   2. WIL ALBARRAN Sister   [phone]           Primary language:  English     needed? No   [employer]:  [phone] op. 1  Other communication barriers: None  Preferred Method of Communication:  Mail  Current living arrangement: I live in a private home  Mobility Status/ Medical Equipment: Independent    Health Maintenance  Health Maintenance Reviewed:      My Access Plan  Medical Emergency 911   Primary Clinic Line [facility] [phone]   24 Hour Appointment Line [phone] or  [phone]) (toll-free)   24 Hour Nurse Line [phone] (toll-free)   Preferred Urgent Care     Preferred Hospital [facility]  [phone]   Preferred Pharmacy [place], [place] - [address] [place] [address]     Behavioral Health Crisis Line The National Suicide Prevention Lifeline at 1-886.439.4708 or 911             My Care Team Members  Patient Care Team       Relationship Specialty Notifications Start End    No Ref-Primary, Physician PCP - General   11/22/19     Fax: [phone]         Linda Macdonald APRN CNP Assigned PCP   8/4/19     Phone: [phone] Fax: [phone] [address] [place] [address]    Diane Mckay, RN Specialty Care Coordinator Gastroenterology  11/29/19     Phone: [phone]         Sonia Pacheco [facility] Pharmacist Pharmacist Ambulatory Care  4/7/20     Phone: [phone]           [address] [address]    Tanner Lamb Community Health Worker   9/15/20             My Care Plans  Self Management and Treatment Plan  Goals and (Comments)  Goals        General    1. Financial Wellbeing (pt-stated)     Notes - Note edited  9/15/2020  2:54 PM by Sara Cerda RN    Goal Statement: I want to start collecting social security benefits due to inability to work.   Date goal set: 9/15/2020  Measure of Success: Obtained social security benefits  Barriers: complex system  Strengths: Motivated and good self-advocate  Date to Achieve By: 3/30/2021  Patient expressed understanding of goal: Yes    Action steps to achieve this goal  1. I will review information at the social security disability website on how to start the process.   2. I will contact the [place] disability hub for assistance navigating the process of applying for social security benefits or contact the agencies provided to me for assistance.   3. I will work with my providers in gathering all the necessary documents/evidence to support my application.   4. I will contact my CC or CHW if I run into any barriers.              Action Plans on File:                       Advance Care Plans/Directives Type:        My Medical and Care Information  Problem List   Patient Active Problem List   Diagnosis     Non morbid obesity due to excess calories     Other isolated or specific phobias     Other congenital malformations of mouth     Contact dermatitis and other eczema, due to unspecified cause     Intestinal infection due to Clostridium difficile     Iron deficiency anemia, unspecified iron deficiency anemia type     Rosacea     Atypical ductal hyperplasia of left breast     Idiopathic thrombocytopenia (H)     Postmenopausal- s/p hysterectomy with BSO - not on HRT secondary to atypical ductal hyperplasia & breast pain -no paps needed     Claustrophobia     S/P total hysterectomy and bilateral salpingo-oophorectomy      Abnormal liver enzymes- ? related to ongoing etoh use/abuse     Essential hypertension with goal blood pressure less than 140/90     Gastroenteritis     Stool incontinence     CARDIOVASCULAR SCREENING; LDL GOAL LESS THAN 130     AA (alcohol abuse) - ? ongoing      Alcoholic fatty liver     Acquired hyperbilirubinemia- ? secondary to alcohol use     Diffuse nodular cirrhosis of liver (H)     Severe Perianal Crohn's Disease     Biliary colic     Cholecystitis     Alcoholic cirrhosis of liver with ascites (H) - seeing [facility]       Current Medications and Allergies:   Current Outpatient Medications   Medication     acetaminophen (TYLENOL) 325 MG tablet     furosemide (LASIX) 40 MG tablet     Milk Thistle-Dand-Fennel-Licor (MILK THISTLE XTRA) CAPS capsule     Multiple Vitamins-Minerals (MULTIVITAMIN & MINERAL PO)     spironolactone (ALDACTONE) 100 MG tablet     No current facility-administered medications for this visit.          Care Coordination Start Date: 9/11/2020   Frequency of Care Coordination: 6 weeks   Form Last Updated: 09/15/2020

## 2020-09-11 NOTE — PROGRESS NOTES
Clinic Care Coordination Contact  [facility]/Voicemail       Clinical Data: Care Coordinator Outreach    1This CC received a voice mail from Patient requesting for a call back to get support with Chorn's disease and possibly looking into disability.   -Outreach attempted x 1.  Left message on patient's voicemail with call back information and requested return call.  -CC also send a Mychart message to Patient inquiring about a good date and time to connect.     Plan: Care Coordinator will wait for call or response from Patient. If no call or response is received from Patient, CC will no further do any outreach.     Mak Cerda RN Care Coordinator  [facility]: [place], [place], [place], [place]  Phone: [phone]  E-Mail: [email or web address]

## 2020-09-14 ENCOUNTER — TELEPHONE (OUTPATIENT)
Dept: GASTROENTEROLOGY | Facility: CLINIC | Age: 55
End: 2020-09-14

## 2020-09-14 NOTE — TELEPHONE ENCOUNTER
[facility] Call Center    Phone Message    May a detailed message be left on voicemail: yes     Reason for Call: Medication Refill Request    Has the patient contacted the pharmacy for the refill? Yes   Name of medication being requested: spironolactone (ALDACTONE) 100 MG tablet and furosemide (LASIX) 40 MG tablet    Provider who prescribed the medication: Dr Cervantes  Pharmacy: [facility] - [address]   Date medication is needed: as soon as possible      >>PT IS REQUESTING A 90 DAY SUPPLY INSTEAD OF A 30 DAY SUPPLY        Other: Pt calling in returning missed call from Robert she would like to know why she needs an appointment if it would be virtual, she also wanted to inform her PCP ezekiel all of her labs and would like Dr Cervantes to review those labs instead of having to do them again, please call back to discuss further      Action Taken: Message routed to:  [facility] hepatology    Travel Screening: Not Applicable

## 2020-09-14 NOTE — TELEPHONE ENCOUNTER
Returned call to patient. There was no answer, detailed message left noting that a 3 month supply of refills will be sent as requested, but will be unable to provide any further refills until patient has visit with provider. Patient has not been seen in the hepatology clinic since 11/2019. Provided scheduling number, and also will sent to our  to reach out. Patient has had recent lab work, and does not require further labs at this time.

## 2020-09-14 NOTE — PROGRESS NOTES
Clinic Care Coordination Contact  [facility]/Voicemail       Clinical Data: Care Coordinator Outreach  -Receive voicemail from Patient requesting for a call back.   Outreach attempted x 2.  Left message on patient's voicemail with call back information and requested return call. CC ask for a good date and time to call Patient back to avoid phone tags.   Plan:  Care Coordinator will try to reach patient again in 1-2 business days.    Mak Cerda RN Care Coordinator  [facility] Clinics: [place], [place], [place], [place]  Phone: [phone]  E-Mail: [email or web address]

## 2020-09-15 ENCOUNTER — VIRTUAL VISIT (OUTPATIENT)
Dept: GASTROENTEROLOGY | Facility: CLINIC | Age: 55
End: 2020-09-15
Attending: INTERNAL MEDICINE
Payer: COMMERCIAL

## 2020-09-15 DIAGNOSIS — K70.31 ALCOHOLIC CIRRHOSIS OF LIVER WITH ASCITES (H): Primary | ICD-10-CM

## 2020-09-15 DIAGNOSIS — K74.60 DECOMPENSATION OF CIRRHOSIS OF LIVER (H): ICD-10-CM

## 2020-09-15 DIAGNOSIS — K72.90 DECOMPENSATION OF CIRRHOSIS OF LIVER (H): ICD-10-CM

## 2020-09-15 RX ORDER — SPIRONOLACTONE 100 MG/1
100 TABLET, FILM COATED ORAL DAILY
Qty: 90 TABLET | Refills: 3 | Status: SHIPPED | OUTPATIENT
Start: 2020-09-15 | End: 2021-06-29

## 2020-09-15 RX ORDER — FUROSEMIDE 40 MG
40 TABLET ORAL DAILY
Qty: 90 TABLET | Refills: 3 | Status: SHIPPED | OUTPATIENT
Start: 2020-09-15 | End: 2021-06-29

## 2020-09-15 SDOH — SOCIAL STABILITY: SOCIAL NETWORK
IN A TYPICAL WEEK, HOW MANY TIMES DO YOU TALK ON THE PHONE WITH FAMILY, FRIENDS, OR NEIGHBORS?: MORE THAN THREE TIMES A WEEK

## 2020-09-15 SDOH — ECONOMIC STABILITY: TRANSPORTATION INSECURITY
IN THE PAST 12 MONTHS, HAS LACK OF TRANSPORTATION KEPT YOU FROM MEETINGS, WORK, OR FROM GETTING THINGS NEEDED FOR DAILY LIVING?: NO

## 2020-09-15 SDOH — ECONOMIC STABILITY: TRANSPORTATION INSECURITY
IN THE PAST 12 MONTHS, HAS THE LACK OF TRANSPORTATION KEPT YOU FROM MEDICAL APPOINTMENTS OR FROM GETTING MEDICATIONS?: NO

## 2020-09-15 SDOH — HEALTH STABILITY: MENTAL HEALTH
STRESS IS WHEN SOMEONE FEELS TENSE, NERVOUS, ANXIOUS, OR CAN'T SLEEP AT NIGHT BECAUSE THEIR MIND IS TROUBLED. HOW STRESSED ARE YOU?: RATHER MUCH

## 2020-09-15 SDOH — ECONOMIC STABILITY: INCOME INSECURITY: HOW HARD IS IT FOR YOU TO PAY FOR THE VERY BASICS LIKE FOOD, HOUSING, MEDICAL CARE, AND HEATING?: NOT VERY HARD

## 2020-09-15 SDOH — ECONOMIC STABILITY: FOOD INSECURITY: WITHIN THE PAST 12 MONTHS, YOU WORRIED THAT YOUR FOOD WOULD RUN OUT BEFORE YOU GOT MONEY TO BUY MORE.: NEVER TRUE

## 2020-09-15 SDOH — ECONOMIC STABILITY: FOOD INSECURITY: WITHIN THE PAST 12 MONTHS, THE FOOD YOU BOUGHT JUST DIDN'T LAST AND YOU DIDN'T HAVE MONEY TO GET MORE.: NEVER TRUE

## 2020-09-15 SDOH — HEALTH STABILITY: PHYSICAL HEALTH: ON AVERAGE, HOW MANY DAYS PER WEEK DO YOU ENGAGE IN MODERATE TO STRENUOUS EXERCISE (LIKE A BRISK WALK)?: 0 DAYS

## 2020-09-15 SDOH — SOCIAL STABILITY: SOCIAL NETWORK: HOW OFTEN DO YOU GET TOGETHER WITH FRIENDS OR RELATIVES?: MORE THAN THREE TIMES A WEEK

## 2020-09-15 ASSESSMENT — PAIN SCALES - GENERAL: PAINLEVEL: NO PAIN (0)

## 2020-09-15 NOTE — PROGRESS NOTES
"Abiola Matute is a 55 year old female who is being evaluated via a billable telephone visit during the COVID-19 pandemic and clinic response to limit in-person visits.  The patient has been notified of following:   \"This telephone visit will be conducted via a call between you and your physician/provider. We have found that certain health care needs can be provided without the need for a physical exam.  This service lets us provide the care you need with a short phone conversation.  If a prescription is necessary we can send it directly to your pharmacy.  If lab work is needed we can place an order for that and you can then stop by our lab to have the test done at a later time.  If during the course of the call the physician/provider feels a telephone visit is not appropriate, you will not be charged for this service.\" Telephone visits are billed at different rates depending on your insurance coverage. During this emergency period, for some insurers they may be billed the same as an in-person visit.  Please reach out to your insurance provider with any questions.  Consent has been obtained for this service by 1 care team member: yes  I have reviewed and updated the patient's Past Medical History, Social History, Family History and Medication List.    What phone number would you like to be contacted at? [phone]    Phone call contact time  Call Started at 11:10  Call Ended at 11:25  On phone patient    A/P  55 Y F with DUNN and alcoholic cirrhosis    Cirrhosis decompensated with ascites and abnormal liver function. MELD 15. Away from ETOH for 14 months, Labs have stabilized with mild dysfunction.    HCC screening Due in October. Ordered.  Cholecystitis Cystic duct stent in place.  Variceal screening  No varices on EGD, next due December 2021  Thrombocytopenia 2/2 ETOH and cirrhosis. Plt around 100.    Ascites Controlled with diuretics. Discussed this and low Na. Beau 100 mg and furosemide 40. Renewed.  AUD no " ETOH for 14 months.  Bone health DEXA showed osteopenia. Taking ca and D.  Crohns Sees Dr Schilling.    Discussed cirrhosis and its sequelae.     RTC 1 y      Jeannette Cervantes MD  Hepatology/Liver Transplant  Medical Director, Liver Transplantation  [facility]  Subjective  55 Y F with alcoholic cirrhosis. Diagnosis was made in 2019  when she was having colon evaluations with a diagnosis of perianal Crohn's and her elevated liver tests were addressed. She has had elevated liver tests for about 7 years.    Admitted [facility] 4/21/20 for acute on chronic cholecystitis, underwent ERCP with cystic stent, pancreatic stent. AXR 5/20/20 showed cystic duct stent. Doing well since then. Fluid under control. No pain. Most issues surround Crohn's.    Liver biopsy 4/24/19 read by Dr. Ger Montoya  1. Steatohepatitis, featuring:     a. Moderate steatosis (35%, mixed macro and microvesicular, non-zonal)     b. Mild necroinflammatory activity (grade 1 of 3)     c. Well-developed micronodular cirrhosis (stage 4 of 4)  2. Negative for autoimmune hepatitis or other intercurrent liver disease   3. See comment    A) Needle biopsy of liver provides an adequate sample, 1.8 cm in aggregate length, with approximately nine portal areas present. Bile ducts are intact. Well-developed micronodular cirrhosis is associated with overt steatohepatitis. Prominent Linh's hyaline is present, somewhat suggestive of alcoholic etiology. There is no significant lymphoplasma cellular infiltrate and no interface hepatitis to support diagnosis of autoimmune hepatitis. Trichrome stain confirms micronodular cirrhosis. An iron stain is negative.    Reviewed with Dr. Cardona.   53-year-old woman presents with abnormal liver tests and cross sectional CT suggestive of cirrhosis. The patient has been diagnosed with portal hypertension and has anal changes clinically suggestive of Crohn's disease. Serum chemistries include AST 99, ALT 65, alkaline  phosphatase 151, total bilirubin 2.4, mildly elevated IgG and IgM and elevated F-actin at 28. Antinuclear antibody is negative.     Alcohol  Last alcohol was August 2019. Has an NA beer on occasion.  Alcohol pattern had been 3-4 times per week, 2-6 beers depending on activities.   No CD treatment, no DWI.    Ascites  Para 10/9/19 3700 ml  This has resolved on kathleen 100 and furosemide 40    Crohns  She was started on infliximab     Risk factors for fatty liver: was obese in the past, typically 200 pounds. No DM. HTN    Lab Test 09/04/20  1413   PROTTOTAL 8.1   ALBUMIN 3.6   BILITOTAL 2.1*   ALKPHOS 142   AST 62*   ALT 46     Lab Test 09/04/20  1413   WBC 4.0   RBC 3.98   HGB 13.9   HCT 40.7   *   MCH 34.9*   MCHC 34.2   RDW 13.8   *     Lab Test 11/22/19  0830   PROTTOTAL 8.8   ALBUMIN 2.4*   BILITOTAL 1.8*   ALKPHOS 118   AST 69*   ALT 42     Lab Test 11/22/19  0830   WBC 3.6*   RBC 3.71*   HGB 13.1   HCT 37.6   *   MCH 35.3*   MCHC 34.8   RDW 13.5   *       MELD-Na score: 11 at 4/25/2020  6:17 AM  MELD score: 11 at 4/25/2020  6:17 AM  Calculated from:  Serum Creatinine: 0.67 mg/dL (Rounded to 1 mg/dL) at 4/25/2020  6:17 AM  Serum Sodium: 134 mmol/L at 4/25/2020  6:17 AM  Total Bilirubin: 1.6 mg/dL at 4/25/2020  6:17 AM  INR(ratio): 1.33 at 4/24/2020  6:23 AM  Age: 54 years 9 months    HCV neg  HBV neg  JILL neg  Factin 28  AMA neg  Iron panel sat 59, ferritin 349  No HFE testing  Liver biopsy c/w alcoholic liver disease    Past Medical History  Past Medical History:   Diagnosis Date     Alcohol abuse      Alcoholic cirrhosis of liver with ascites      Atypical ductal hyperplasia of breast 9/10/10    ERT not recommended -left - and flat epithelial atypia-scheduled for breast biopsy 9/17/2010      Bifid uvula      Cholelithiasis      Contact perianal dermatitis and other eczema     recurrent - clobetasol      Crohn disease      Fear of flying      - gets Ativan prn.      Hypertension       IBS (irritable bowel syndrome)        Social History  Social History     Socioeconomic History     Marital status:      Spouse name: Albino     Number of children: 3     Years of education: 14     Highest education level: Not on file   Occupational History     Occupation: [employer]     Comment:    Social Needs     Financial resource strain: Not on file     Food insecurity     Worry: Not on file     Inability: Not on file     Transportation needs     Medical: Not on file     Non-medical: Not on file   Tobacco Use     Smoking status: Never Smoker     Smokeless tobacco: Never Used   Substance and Sexual Activity     Alcohol use: Yes     Alcohol/week: 0.0 standard drinks     Comment: occasional     Drug use: No     Comment: no herbal meds either     Sexual activity: Yes     Partners: Male     Birth control/protection: Surgical     Comment: harper had vasectomy   Lifestyle     Physical activity     Days per week: Not on file     Minutes per session: Not on file     Stress: Not on file   Relationships     Social connections     Talks on phone: Not on file     Gets together: Not on file     Attends Holiness service: Not on file     Active member of club or organization: Not on file     Attends meetings of clubs or organizations: Not on file     Relationship status: Not on file     Intimate partner violence     Fear of current or ex partner: Not on file     Emotionally abused: Not on file     Physically abused: Not on file     Forced sexual activity: Not on file   Other Topics Concern      Service No     Blood Transfusions No     Caffeine Concern No     Comment: rarely drinks caffeine     Occupational Exposure Not Asked     Hobby Hazards Not Asked     Sleep Concern Not Asked     Stress Concern Not Asked     Weight Concern Not Asked     Special Diet Not Asked     Back Care Not Asked     Exercise Yes     Comment: does a lot of walking - 4x/week     Bike Helmet Not Asked     Seat Belt  Yes     Comment: always     Self-Exams Yes     Comment: SBE encouraged monthly     Parent/sibling w/ CABG, MI or angioplasty before 65F 55M? Yes   Social History Narrative    calcium - drinks 5-6 large glasses skim milk/day    flex sig/colonoscopy -at age 49    sun precautions - discussed    mammogram - needs every 2 years in her 30's, then yearly from then on    Td booster -  and 2010    pneumovax -at age 59    DEXA -when perimenopausal    stool hemoccults - every year after age 40    ASA- start at age 40    mulvitamin - encouraged   Not currently working since 2019. Got laid off and hasn't returned due to health.     Family History  Family History   Problem Relation Age of Onset     Breast Cancer Mother      Gastrointestinal Disease Mother      Heart Disease Father 57     Heart Disease Paternal Grandfather      Hypertension Maternal Grandmother      Diabetes Paternal Grandmother      Diabetes Maternal Grandfather      Cancer Sister    F  from heart disease    ROS 10 point ROS neg other than the symptoms noted above in the HPI.

## 2020-09-15 NOTE — PROGRESS NOTES
"Abiola Matute is a 55 year old female who is being evaluated via a billable telephone visit.      The patient has been notified of following:     \"This telephone visit will be conducted via a call between you and your physician/provider. We have found that certain health care needs can be provided without the need for a physical exam.  This service lets us provide the care you need with a short phone conversation.  If a prescription is necessary we can send it directly to your pharmacy.  If lab work is needed we can place an order for that and you can then stop by our lab to have the test done at a later time.    Telephone visits are billed at different rates depending on your insurance coverage. During this emergency period, for some insurers they may be billed the same as an in-person visit.  Please reach out to your insurance provider with any questions.    If during the course of the call the physician/provider feels a telephone visit is not appropriate, you will not be charged for this service.\"    Patient has given verbal consent for Telephone visit?  Yes    What phone number would you like to be contacted at? [phone]    How would you like to obtain your AVS? Neri    Phone call duration: *** minutes    {signature options:[id number]}      "

## 2020-09-15 NOTE — PROGRESS NOTES
Clinic Care Coordination Contact    Clinic Care Coordination Contact  OUTREACH    Referral Information:  Referral Source: Care Team    Primary Diagnosis: Psychosocial    Chief Complaint   Patient presents with     Clinic Care Coordination - Initial     support/resource        Universal Utilization: Reviewed  Clinic Utilization  Difficulty keeping appointments:: No  Compliance Concerns: No  No-Show Concerns: No  No PCP office visit in Past Year: No  Utilization    Last refreshed: 9/15/2020  2:39 PM:  Hospital Admissions 6           Last refreshed: 9/15/2020  2:39 PM:  ED Visits 2           Last refreshed: 9/15/2020  2:39 PM:  No Show Count (past year) 0              Current as of: 9/15/2020  2:39 PM              Clinical Concerns:  Receive phone call from Patient. Patient indicate she has not been working since Janaurty 2019 due to health problems and is interested in applying or social security but do not know how to go about doing this. Patient reports living at home with her spouse and son. Some days are better than others and can vary day to day and week to week. Some tasks can become very hard for her to do due to joint pain. Patient indicates she was let go and has been on un-employment but this will be ending soon. Patient indicates she is able to care for herself but when it comes to managing the home, this can be hard for her to do. Patient reports having the support of her son and spouse to assist with managing the home. Patient indicates she would like support in applying for social security benefits and agreed to Care Coordination service.     Current Medical Concerns:    Patient Active Problem List   Diagnosis     Non morbid obesity due to excess calories     Other isolated or specific phobias     Other congenital malformations of mouth     Contact dermatitis and other eczema, due to unspecified cause     Intestinal infection due to Clostridium difficile     Iron deficiency anemia, unspecified iron  deficiency anemia type     Rosacea     Atypical ductal hyperplasia of left breast     Idiopathic thrombocytopenia (H)     Postmenopausal- s/p hysterectomy with BSO - not on HRT secondary to atypical ductal hyperplasia & breast pain -no paps needed     Claustrophobia     S/P total hysterectomy and bilateral salpingo-oophorectomy     Abnormal liver enzymes- ? related to ongoing etoh use/abuse     Essential hypertension with goal blood pressure less than 140/90     Gastroenteritis     Stool incontinence     CARDIOVASCULAR SCREENING; LDL GOAL LESS THAN 130     AA (alcohol abuse) - ? ongoing      Alcoholic fatty liver     Acquired hyperbilirubinemia- ? secondary to alcohol use     Diffuse nodular cirrhosis of liver (H)     Severe Perianal Crohn's Disease     Biliary colic     Cholecystitis     Alcoholic cirrhosis of liver with ascites (H) - seeing [facility]          Current Behavioral Concerns: Report some sign of depression which CC encouraged seeing a therapist but Patient declined at this time.     Education Provided to patient: Introduction and CC s role. Discussed with Patient about the process of applying and informed Patient that the process can be daunting and long but don t give up if she is being denied the first time.    Pain  Pain (GOAL):: No  Health Maintenance Reviewed:    Clinical Pathway: None    Medication Management:  Patient report being independent in managing her medications.      Functional Status:  Bed or wheelchair confined:: No  Mobility Status: Independent  Fallen 2 or more times in the past year?: No  Any fall with injury in the past year?: No    Living Situation:  Current living arrangement:: I live in a private home  Type of residence:: Private home - stairs    Lifestyle & Psychosocial Needs:  Lifestyle     Physical activity     Days per week: 0 days     Minutes per session: Not on file     Stress: Rather much     Social Needs     Financial resource strain: Not very hard     Food insecurity      Worry: Never true     Inability: Never true     Transportation needs     Medical: No     Non-medical: No     Tube Feeding: No  Inadequate activity/exercise (GOAL):: No  Transportation means:: Regular car     Sikh or spiritual beliefs that impact treatment:: No  Mental health DX:: No  Mental health management concern (GOAL):: Yes  Informal Support system:: Family   Socioeconomic History     Marital status:      Spouse name: Albino     Number of children: 3     Years of education: 14     Highest education level: Not on file   Occupational History     Occupation: [employer]     Comment:    Relationships     Social connections     Talks on phone: More than three times a week     Gets together: More than three times a week     Attends Denominational service: Not on file     Active member of club or organization: Not on file     Attends meetings of clubs or organizations: Not on file     Relationship status: Not on file     Intimate partner violence     Fear of current or ex partner: Not on file     Emotionally abused: Not on file     Physically abused: Not on file     Forced sexual activity: Not on file     Tobacco Use     Smoking status: Never Smoker     Smokeless tobacco: Never Used   Substance and Sexual Activity     Alcohol use: Yes     Alcohol/week: 0.0 standard drinks     Comment: occasional     Drug use: No     Comment: no herbal meds either     Sexual activity: Yes     Partners: Male     Birth control/protection: Surgical     Comment: harper had vasectomy          Resources and Interventions:  Current Resources:      Community Resources: None  Supplies used at home:: None  Equipment Currently Used at Home: none    Advance Care Plan/Directive  Advanced Care Plans/Directives on file:: No  Advanced Care Plan/Directive Status: In Process          Goals:   Goals        General    1. Financial Wellbeing (pt-stated)     Notes - Note edited  9/15/2020  2:54 PM by Sara Cerda RN    Goal  Statement: I want to start collecting social security benefits due to inability to work.   Date goal set: 9/15/2020  Measure of Success: Obtained social security benefits  Barriers: complex system  Strengths: Motivated and good self-advocate  Date to Achieve By: 3/30/2021  Patient expressed understanding of goal: Yes    Action steps to achieve this goal  1. I will review information at the social security disability website on how to start the process.   2. I will contact the [place] disability hub for assistance navigating the process of applying for social security benefits or contact the agencies provided to me for assistance.   3. I will work with my providers in gathering all the necessary documents/evidence to support my application.   4. I will contact my CC or CHW if I run into any barriers.           Social determinant of health reviewed with patient.       Patient/Caregiver understanding: Patient verbalized understanding and denies any additional questions or concerns at this time. RNCC engaged in [employer]ID[employer] communications during encounter.     Outreach Frequency: 6 weeks  Future Appointments              In 1 month  INFUSION CHAIR [facility] [place]          Plan: Patient will continue to see PCP for medical concerns.   Patient has been provided with CC s contact information to call for any additional support.   CC will send introduction letter with information on support with applying for social security and a copy of careplan to Patient s [facility].   -The following information is shared with Patient in the introduction letter:     To start the application process, you can always go to the social security website at: https://www.ssa.gov/benefits/forms/    If you need support with completing the application or is interested in receiving help with the application, I recommend contacting the [place] disability Hub at: 1-568.303.9445    The following two agencies also help people  apply for social security:  Disability specialists: 1-727.226.6686  Quality disability services: 1-622.234.7130    CHW will check in with Patient in three weeks and CC will check in with Patient in six weeks.     Mak Cerda RN Care Coordinator  [facility] Clinics: [place], [place], [place], [place]  Phone: [phone]  E-Mail: [email or web address]

## 2020-09-15 NOTE — LETTER
"    9/15/2020         RE: Abiola Matute  [address] [facility] [id number]        Dear Colleague,    Thank you for referring your patient, Abiola Matute, to the [facility] HEPATOLOG[facility]. Please see a copy of my visit note below.    Abiola Matute is a 55 year old female who is being evaluated via a billable telephone visit during the COVID-19 pandemic and clinic response to limit in-person visits.  The patient has been notified of following:   \"This telephone visit will be conducted via a call between you and your physician/provider. We have found that certain health care needs can be provided without the need for a physical exam.  This service lets us provide the care you need with a short phone conversation.  If a prescription is necessary we can send it directly to your pharmacy.  If lab work is needed we can place an order for that and you can then stop by our lab to have the test done at a later time.  If during the course of the call the physician/provider feels a telephone visit is not appropriate, you will not be charged for this service.\" Telephone visits are billed at different rates depending on your insurance coverage. During this emergency period, for some insurers they may be billed the same as an in-person visit.  Please reach out to your insurance provider with any questions.  Consent has been obtained for this service by 1 care team member: yes  I have reviewed and updated the patient's Past Medical History, Social History, Family History and Medication List.    What phone number would you like to be contacted at? [phone]    Phone call contact time  Call Started at 11:10  Call Ended at 11:25  On phone patient    A/P  55 Y F with DUNN and alcoholic cirrhosis    Cirrhosis decompensated with ascites and abnormal liver function. MELD 15. Away from ETOH for 14 months, Labs have stabilized with mild dysfunction.    HCC screening Due in October. Ordered.  Cholecystitis Cystic duct stent " in place.  Variceal screening  No varices on EGD, next due December 2021  Thrombocytopenia 2/2 ETOH and cirrhosis. Plt around 100.    Ascites Controlled with diuretics. Discussed this and low Na. Beau 100 mg and furosemide 40. Renewed.  AUD no ETOH for 14 months.  Bone health DEXA showed osteopenia. Taking ca and D.  Crohns Sees Dr Schilling.    Discussed cirrhosis and its sequelae.     RTC 1 y      Jeannette Cervantes MD  Hepatology/Liver Transplant  Medical Director, Liver Transplantation  [facility]  Subjective  55 Y F with alcoholic cirrhosis. Diagnosis was made in 2019  when she was having colon evaluations with a diagnosis of perianal Crohn's and her elevated liver tests were addressed. She has had elevated liver tests for about 7 years.    Admitted [facility] 4/21/20 for acute on chronic cholecystitis, underwent ERCP with cystic stent, pancreatic stent. AXR 5/20/20 showed cystic duct stent. Doing well since then. Fluid under control. No pain. Most issues surround Crohn's.    Liver biopsy 4/24/19 read by Dr. Ger Montoya  1. Steatohepatitis, featuring:     a. Moderate steatosis (35%, mixed macro and microvesicular, non-zonal)     b. Mild necroinflammatory activity (grade 1 of 3)     c. Well-developed micronodular cirrhosis (stage 4 of 4)  2. Negative for autoimmune hepatitis or other intercurrent liver disease   3. See comment    A) Needle biopsy of liver provides an adequate sample, 1.8 cm in aggregate length, with approximately nine portal areas present. Bile ducts are intact. Well-developed micronodular cirrhosis is associated with overt steatohepatitis. Prominent Linh's hyaline is present, somewhat suggestive of alcoholic etiology. There is no significant lymphoplasma cellular infiltrate and no interface hepatitis to support diagnosis of autoimmune hepatitis. Trichrome stain confirms micronodular cirrhosis. An iron stain is negative.    Reviewed with Dr. Cardona.   53-year-old woman presents with  abnormal liver tests and cross sectional CT suggestive of cirrhosis. The patient has been diagnosed with portal hypertension and has anal changes clinically suggestive of Crohn's disease. Serum chemistries include AST 99, ALT 65, alkaline phosphatase 151, total bilirubin 2.4, mildly elevated IgG and IgM and elevated F-actin at 28. Antinuclear antibody is negative.     Alcohol  Last alcohol was August 2019. Has an NA beer on occasion.  Alcohol pattern had been 3-4 times per week, 2-6 beers depending on activities.   No CD treatment, no DWI.    Ascites  Para 10/9/19 3700 ml  This has resolved on kathleen 100 and furosemide 40    Crohns  She was started on infliximab     Risk factors for fatty liver: was obese in the past, typically 200 pounds. No DM. HTN    Lab Test 09/04/20  1413   PROTTOTAL 8.1   ALBUMIN 3.6   BILITOTAL 2.1*   ALKPHOS 142   AST 62*   ALT 46     Lab Test 09/04/20  1413   WBC 4.0   RBC 3.98   HGB 13.9   HCT 40.7   *   MCH 34.9*   MCHC 34.2   RDW 13.8   *     Lab Test 11/22/19  0830   PROTTOTAL 8.8   ALBUMIN 2.4*   BILITOTAL 1.8*   ALKPHOS 118   AST 69*   ALT 42     Lab Test 11/22/19  0830   WBC 3.6*   RBC 3.71*   HGB 13.1   HCT 37.6   *   MCH 35.3*   MCHC 34.8   RDW 13.5   *       MELD-Na score: 11 at 4/25/2020  6:17 AM  MELD score: 11 at 4/25/2020  6:17 AM  Calculated from:  Serum Creatinine: 0.67 mg/dL (Rounded to 1 mg/dL) at 4/25/2020  6:17 AM  Serum Sodium: 134 mmol/L at 4/25/2020  6:17 AM  Total Bilirubin: 1.6 mg/dL at 4/25/2020  6:17 AM  INR(ratio): 1.33 at 4/24/2020  6:23 AM  Age: 54 years 9 months    HCV neg  HBV neg  JILL neg  Factin 28  AMA neg  Iron panel sat 59, ferritin 349  No HFE testing  Liver biopsy c/w alcoholic liver disease    Past Medical History  Past Medical History:   Diagnosis Date     Alcohol abuse      Alcoholic cirrhosis of liver with ascites      Atypical ductal hyperplasia of breast 9/10/10    ERT not recommended -left - and flat epithelial  atypia-scheduled for breast biopsy 9/17/2010      Bifid uvula      Cholelithiasis      Contact perianal dermatitis and other eczema     recurrent - clobetasol      Crohn disease      Fear of flying      - gets Ativan prn.      Hypertension      IBS (irritable bowel syndrome)        Social History  Social History     Socioeconomic History     Marital status:      Spouse name: Albino     Number of children: 3     Years of education: 14     Highest education level: Not on file   Occupational History     Occupation: [employer]     Comment:    Social Needs     Financial resource strain: Not on file     Food insecurity     Worry: Not on file     Inability: Not on file     Transportation needs     Medical: Not on file     Non-medical: Not on file   Tobacco Use     Smoking status: Never Smoker     Smokeless tobacco: Never Used   Substance and Sexual Activity     Alcohol use: Yes     Alcohol/week: 0.0 standard drinks     Comment: occasional     Drug use: No     Comment: no herbal meds either     Sexual activity: Yes     Partners: Male     Birth control/protection: Surgical     Comment: harper had vasectomy   Lifestyle     Physical activity     Days per week: Not on file     Minutes per session: Not on file     Stress: Not on file   Relationships     Social connections     Talks on phone: Not on file     Gets together: Not on file     Attends Sabianism service: Not on file     Active member of club or organization: Not on file     Attends meetings of clubs or organizations: Not on file     Relationship status: Not on file     Intimate partner violence     Fear of current or ex partner: Not on file     Emotionally abused: Not on file     Physically abused: Not on file     Forced sexual activity: Not on file   Other Topics Concern      Service No     Blood Transfusions No     Caffeine Concern No     Comment: rarely drinks caffeine     Occupational Exposure Not Asked     Hobby Hazards Not  Asked     Sleep Concern Not Asked     Stress Concern Not Asked     Weight Concern Not Asked     Special Diet Not Asked     Back Care Not Asked     Exercise Yes     Comment: does a lot of walking - 4x/week     Bike Helmet Not Asked     Seat Belt Yes     Comment: always     Self-Exams Yes     Comment: SBE encouraged monthly     Parent/sibling w/ CABG, MI or angioplasty before 65F 55M? Yes   Social History Narrative    calcium - drinks 5-6 large glasses skim milk/day    flex sig/colonoscopy -at age 49    sun precautions - discussed    mammogram - needs every 2 years in her 30's, then yearly from then on    Td booster -  and 2010    pneumovax -at age 59    DEXA -when perimenopausal    stool hemoccults - every year after age 40    ASA- start at age 40    mulvitamin - encouraged   Not currently working since 2019. Got laid off and hasn't returned due to health.     Family History  Family History   Problem Relation Age of Onset     Breast Cancer Mother      Gastrointestinal Disease Mother      Heart Disease Father 57     Heart Disease Paternal Grandfather      Hypertension Maternal Grandmother      Diabetes Paternal Grandmother      Diabetes Maternal Grandfather      Cancer Sister    F  from heart disease    ROS 10 point ROS neg other than the symptoms noted above in the HPI.      Again, thank you for allowing me to participate in the care of your patient.        Sincerely,        Jeannette Cervantes MD

## 2020-09-29 ENCOUNTER — PATIENT OUTREACH (OUTPATIENT)
Dept: GASTROENTEROLOGY | Facility: CLINIC | Age: 55
End: 2020-09-29

## 2020-09-29 NOTE — PROGRESS NOTES
Contacted patient to discuss transitioning to [facility] home infusion   Patient is okay with home infusion in her home.   Contacted [facility] prior to start the process   Patient due end of October   Will wait to start prior until tomorrow after clinic video visit due to patients symptoms.         Susanna Can Ann, RN; Farhan Schilling MD Hi Ann,     Unfortunately this patients insurance has a policy change and is requiring that they transition to home infusion for future Remicade infusions.     Let me know how you would like to proceed.

## 2020-09-29 NOTE — PROGRESS NOTES
Patient has the following symptoms  States after her remicade about 3 to 4 weeks after her remicade starts to develop more loose stools and joint pain  States a pattern for the last at least 2 infusions    She is about 3 weeks now  Canker sores  Fissure/open sore  as she states at her butt crack  cosntipatoin this am and now having 4 or more loose formed stools to diarrhea   Has some anxiety due to not working and pressure of not working and covid   Pt did not have labs at her last infusion as patient has appt with Dr. Cervantes on the 15th. Not all labs were drawn  Pt overdue for clinic visit  Patient now scheduled with Ms. Serrato for video visit on September 30  Await to do stool studies etc until after visit  No recent remicade level  Briefly discussed health psychology referral

## 2020-10-01 ENCOUNTER — APPOINTMENT (OUTPATIENT)
Dept: LAB | Facility: CLINIC | Age: 55
End: 2020-10-01
Attending: INTERNAL MEDICINE
Payer: COMMERCIAL

## 2020-10-01 ENCOUNTER — CARE COORDINATION (OUTPATIENT)
Dept: GASTROENTEROLOGY | Facility: CLINIC | Age: 55
End: 2020-10-01

## 2020-10-07 ENCOUNTER — VIRTUAL VISIT (OUTPATIENT)
Dept: GASTROENTEROLOGY | Facility: CLINIC | Age: 55
End: 2020-10-07
Payer: COMMERCIAL

## 2020-10-07 VITALS — HEIGHT: 65 IN | WEIGHT: 168 LBS | BODY MASS INDEX: 27.99 KG/M2

## 2020-10-07 DIAGNOSIS — K50.113 CROHN'S DISEASE OF LARGE INTESTINE WITH FISTULA (H): Primary | ICD-10-CM

## 2020-10-07 PROCEDURE — 99214 OFFICE O/P EST MOD 30 MIN: CPT | Mod: 95 | Performed by: PHYSICIAN ASSISTANT

## 2020-10-07 ASSESSMENT — MIFFLIN-ST. JEOR: SCORE: 1352.92

## 2020-10-07 NOTE — NURSING NOTE
"Chief Complaint   Patient presents with     RECHECK     IBD       Vitals:    10/07/20 0930   Weight: 76.2 kg (168 lb)   Height: 1.651 m (5' 5\")       Body mass index is 27.96 kg/m .      Lefty Lainez LPN                        "

## 2020-10-07 NOTE — LETTER
"    10/7/2020         RE: Abiola Matute  [address] [id number]        Dear Colleague,    Thank you for referring your patient, Abiola Matute, to the [facility] [place]. Please see a copy of my visit note below.    Abiola Matute is a 55 year old female who is being evaluated via a billable video visit.      The patient has been notified of following:     \"This video visit will be conducted via a call between you and your physician/provider. We have found that certain health care needs can be provided without the need for an in-person physical exam.  This service lets us provide the care you need with a video conversation.  If a prescription is necessary we can send it directly to your pharmacy.  If lab work is needed we can place an order for that and you can then stop by our lab to have the test done at a later time.    Video visits are billed at different rates depending on your insurance coverage.  Please reach out to your insurance provider with any questions.    If during the course of the call the physician/provider feels a video visit is not appropriate, you will not be charged for this service.\"    Patient has given verbal consent for Video visit? Yes  How would you like to obtain your AVS? MyChart  If you are dropped from the video visit, the video invite should be resent to: Text to cell phone: [phone]  Will anyone else be joining your video visit? No        Video-Visit Details    Type of service:  Video Visit    Video Start Time: 9:52 AM  Video End Time: 10:14 AM    Originating Location (pt. Location): Home    Distant Location (provider location):  [facility] [place]     Platform used for Video Visit: Suma Serrato PA-C    IBD CLINIC VISIT    CC/REFERRING MD:  Referred Self  REASON FOR CONSULTATION: Crohn's.     ASSESSMENT/PLAN:  55 year old female with cirrhosis and Crohn's    1.  " Crohn's disease: Mostly perianal fistula based on most recent endoscopic assessment 12/2019 (which was prior to dose increase). Symptomatically somewhat improved with increasing the dose of infliximab to 10 mg/kg, however has breakthrough symptoms as she approaches her infusion even up to 3-4 weeks before.  Her level is quite low based on level obtained 1/2020 at 2.3, no antibodies present.  With the presence of perianal disease, she may require higher doses of infliximab. Given disease is limited to perianal area, we will reassess with MRI of pelvis to help identify some objective markers of active disease and likely move to every 4 week dosing.  -- MRI pelvis to be completed, then likely will move to every 4 week dosing pending findings.  -- Continue infliximab 10mg/kg every 8 weeks for now  --Laboratory studies to be completed every 3 months while on infliximab therapy to include CBC, LFTs, CRP and ESR     2. Cirrhosis: Some notes state cryptogenic, some notes state alcoholic. Recent variceal screening with EGD was unremarkable.  Patient is now established with hepatology with Dr. Cervantes.     3. IBD in setting of coronavirus pandemic: Patients on immunosuppressive drugs for IBD and autoimmune hepatitis should continue taking their medications. The risk of disease flare outweighs the chance of kendell coronavirus. These patients should also follow CDC guidelines for at-risk groups by avoiding crowds and limiting travel.     IBD HISTORY  Age at diagnosis: 53 (4/2019)  Extent of disease: miguel angel-anal, anal  Disease phenotype: Miguel Angel-anal fistula  Miguel Angel-anal disease: Yes  Current CD medications:  - Infliximab - started May 2019 (5mg/kg every 8 weeks)  Prior IBD surgeries: No  Prior IBD Medications:    DRUG MONITORING  TPMT enzyme activity:     6-TGN/6-MMPN levels:    Biologic concentration:  10/2/2019: IFX 0.8, anti-IFX antibodies: 451 (Esoterix)  1/22/2020 infliximab level 2.3, no antibodies (this is an 8-week  trough at 10 mg/kg)    DISEASE ASSESSMENT  Labs  Recent Labs   Lab Test 05/15/20  1330 01/22/20  1055   CRP <2.9 6.7   SED 42* 59*     Fecal calprotectin: --  Endoscopy: Colonoscopy 12/5/2019 shows isolated perianal disease  Enterography: --  C diff: --    sIBDQ:   No flowsheet data found.    IBD Health Care Maintenance:  Vaccinations:  All patients on biologics should avoid live vaccines.    -- Influenza (every year)  -- TdaP (every 10 years)  -- Pneumococcal Pneumonia (once plus booster at 5 years)  -- Yearly assessment for latent Tb (verbal screening and exam, PPD or QuantiFERON-Tb testing)    One time confirmation of immunity or serologies:  -- Hepatitis A (serologies or immunizations)  -- Hepatitis B (serologies or immunizations)  -- Varicella  -- MMR  -- HPV (all aged 18-26)  -- Meningococcal meningitis (all patients at risk for meningitis)  -- Due to the immunosuppression in this patient, I would not advise administration of live vaccines such as varicella/VZV, intranasal influenza, MMR, or yellow fever vaccine (if travelling).      Bone mineral density screening   -- Recommend all patients supplement with calcium and vitamin D  -- Consider DEXA if not already done    Cancer Screening:  Colon cancer screening:  Unclear colonic extent of disease at this time.      Cervical cancer screening: Per OBGYN    Skin cancer screening: Annual visual exam of skin by dermatologist since patient is immunocompromised    Depression Screening:  PHQ-2 Score:     PHQ-2 ( 1999 Pf[employer]) 9/4/2020 7/23/2019   Q1: Little interest or pleasure in doing things 0 0   Q2: Feeling down, depressed or hopeless 0 0   PHQ-2 Score 0 0   Q1: Little interest or pleasure in doing things Not at all -   Q2: Feeling down, depressed or hopeless Not at all -   PHQ-2 Score 0 -     Misc:  -- Avoid tobacco use  -- Avoid NSAIDs as there is potentially a 25% chance of causing an IBD flare    Return to clinic in 3 months    Thank you for this consultation.   It was a pleasure to participate in the care of this patient; please contact us with any further questions.     This note was created with voice recognition software, and while reviewed for accuracy, typos may remain.       Sheldon Serrato PA-C  Division of Gastroenterology, Hepatology and Nutrition  [facility]       HPI:   Patient for follow up of perianal crohn's disease.  Patient additional has hx of alcoholic cirrhosis and follows with hepatology.      Previous hx includes:  She is a history of long-standing diarrhea as well as recurrent aphthous ulcers.  In April 2019 she was diagnosed with Crohn's disease due to perianal fistula.  She started infliximab due to perianal disease, and per colorectal surgery note had may be a 30% improvement in fistula output, however the patient stated she did not have improvement. Additionally, patient had an episode of cholecystitis - treated medically due to liver numbers.      Infliximab level had originally been obtained in oct 2019 showing 10/2/2019: IFX 0.8, anti-IFX antibodies: 451 (Esoterix)     Repeat Infliximab level obtained 11/12/19 was 2.3, no antibodies. Dose was optimized to 10mg/kg every 8 weeks. Colonoscopy 12/2019 showed isolated perianal disease, seton was in place (which has now fallen out).  A new level obtained 1/2020 was also 2.3, no antibodies. No changes at that time.    She did have an improvement with the dose change to 10mg/kg, however recently she notes that up to 3-4 weeks before her next infusion she will have return of canker sores, increased joint pain and an increase in frequency in her stools.  Often she will have a formed stool that is firm and difficult to evacuate followed by 6-7 loose stools the same day that are urgent.  No blood in the stool.  On rare occasions she will have uncontrolled fecal incontinence. She continues to have persistent perianal drainage which is quite bothersome.  Her next infusion is 10/29/20.    She  notes cracking skin on lower back but well above anus within the crease.      She also recent had an ERCP with cystic and pancreatic placement + papillary sphincterotomy on 4/24/20 due to acute on chronic cholecystitis.      ROS:    No fevers or chills  No weight loss  No blurry vision, double vision or change in vision  No sore throat  No lymphadenopathy  No headache, paraesthesias, or weakness in a limb  No shortness of breath or wheezing  No chest pain or pressure  No arthralgias or myalgias  No rashes or skin changes  No odynophagia or dysphagia  No BRBPR, hematochezia, melena  No dysuria, frequency or urgency  No hot/cold intolerance or polyria  No anxiety or depression    Extra intestinal manifestations of IBD:  No uveitis/episcleritis  + aphthous ulcers   + arthritis   No erythema nodosum/pyoderma gangrenosum.     PERTINENT PAST MEDICAL HISTORY:  Past Medical History:   Diagnosis Date     Alcohol abuse      Alcoholic cirrhosis of liver with ascites      Atypical ductal hyperplasia of breast 9/10/10    ERT not recommended -left - and flat epithelial atypia-scheduled for breast biopsy 9/17/2010      Bifid uvula      Cholelithiasis      Contact perianal dermatitis and other eczema     recurrent - clobetasol      Crohn disease      Fear of flying      - gets Ativan prn.      Hypertension      IBS (irritable bowel syndrome)        PREVIOUS SURGERIES:  Past Surgical History:   Procedure Laterality Date     BIOPSY ANAL N/A 3/28/2019    anal biopsy and culure placement of seton - Dr Fleming     BIOPSY BREAST Left 09/17/2010    - scheduled with Dr. Varma      BIOPSY LIVER  2019     C APPENDECTOMY  at age 14     COLONOSCOPY  2006     COLONOSCOPY N/A 12/23/2014    Procedure: COMBINED COLONOSCOPY, SINGLE OR MULTIPLE BIOPSY/POLYPECTOMY BY BIOPSY;  Surgeon: Diane Fleming MD;  Location:  GI     COLONOSCOPY N/A 12/5/2019    Procedure: COLONOSCOPY, WITH POLYPECTOMY AND BIOPSY;  Surgeon: Farhan Schilling MD;   Location: [facility] GI     ENDOSCOPIC RETROGRADE CHOLANGIOPANCREATOGRAM N/A 4/24/2020    Procedure: ENDOSCOPIC RETROGRADE CHOLANGIOPANCREATOGRAPHY WITH, sledge removal,sphincterotomy, stent in gallbladder and pancreatic duct stent, and balloon dilation;  Surgeon: Guru Isac Kraft MD;  Location: [facility] OR     ESOPHAGOSCOPY, GASTROSCOPY, DUODENOSCOPY (EGD), COMBINED N/A 12/5/2019    Procedure: ESOPHAGOGASTRODUODENOSCOPY (EGD);  Surgeon: Farhan Schilling MD;  Location: [facility] GI     EXAM UNDER ANESTHESIA ANUS N/A 3/28/2019    Procedure: EXAM UNDER ANESTHESIA ANUS;  Surgeon: Diane Fleming MD;  Location:  OR     HYSTERECTOMY, VAGINAL  2006    with Dr. Licha Zhou - with BSO for fibroids      IR GALLBLADDER DRAIN PLACEMENT  4/22/2020     OPEN REDUCTION INTERNAL FIXATION ANKLE Left at age 27    plates and screws removed at age 36     Pelviscopy with removal of bilateral hydrosalpinges.  04/15/2010       PREVIOUS ENDOSCOPY:  3/7/19: Flex sig: Colon and rectum appeared normal. 2 large deep ulcers extending from the anal cnaal to left perianal area.     10/23/19: Colonoscopy: Endoscopically normal ileum.  Mild pan colonic congestion with contact friability thought to be related to portal hypertension and thrombocytopenia.  Prior anal verge ulcer is healed    12/23/14: Colonoscopy: Perianal skin tag noted.  Ileum normal, colon normal.    ALLERGIES:     Allergies   Allergen Reactions     Fish Oil      Redness and itching around eye area only - went away when fish oil capsules stopped      Metronidazole      pain/itching     Naphthalenemethylamines      Lamisil = mild urticarial reaction     Ppd [Tuberculin Purified Protein Derivative]      Sulfa Drugs      hives       PERTINENT MEDICATIONS:    Current Outpatient Medications:      acetaminophen (TYLENOL) 325 MG tablet, Take 1-2 tablets (325-650 mg) by mouth every 6 hours as needed for mild pain Take either 325mg every 6 hours or 650mg every 8 hours for pain. Do  not exceed 2000mg in 24 hours., Disp: 100 tablet, Rfl: 0     furosemide (LASIX) 40 MG tablet, Take 1 tablet (40 mg) by mouth daily, Disp: 90 tablet, Rfl: 3     Milk Thistle-Dand-Fennel-Licor (MILK THISTLE XTRA) CAPS capsule, Take 1 capsule by mouth daily, Disp: , Rfl:      Multiple Vitamins-Minerals (MULTIVITAMIN & MINERAL PO), Take  by mouth daily., Disp: , Rfl:      spironolactone (ALDACTONE) 100 MG tablet, Take 1 tablet (100 mg) by mouth daily, Disp: 90 tablet, Rfl: 3    SOCIAL HISTORY:  Social History     Socioeconomic History     Marital status:      Spouse name: Albino     Number of children: 3     Years of education: 14     Highest education level: Not on file   Occupational History     Occupation: [employer]     Comment:    Social Needs     Financial resource strain: Not very hard     Food insecurity     Worry: Never true     Inability: Never true     Transportation needs     Medical: No     Non-medical: No   Tobacco Use     Smoking status: Never Smoker     Smokeless tobacco: Never Used   Substance and Sexual Activity     Alcohol use: Yes     Alcohol/week: 0.0 standard drinks     Comment: occasional     Drug use: No     Comment: no herbal meds either     Sexual activity: Yes     Partners: Male     Birth control/protection: Surgical     Comment: harper had vasectomy   Lifestyle     Physical activity     Days per week: 0 days     Minutes per session: Not on file     Stress: Rather much   Relationships     Social connections     Talks on phone: More than three times a week     Gets together: More than three times a week     Attends Alevism service: Not on file     Active member of club or organization: Not on file     Attends meetings of clubs or organizations: Not on file     Relationship status: Not on file     Intimate partner violence     Fear of current or ex partner: Not on file     Emotionally abused: Not on file     Physically abused: Not on file     Forced sexual  "activity: Not on file   Other Topics Concern      Service No     Blood Transfusions No     Caffeine Concern No     Comment: rarely drinks caffeine     Occupational Exposure Not Asked     Hobby Hazards Not Asked     Sleep Concern Not Asked     Stress Concern Not Asked     Weight Concern Not Asked     Special Diet Not Asked     Back Care Not Asked     Exercise Yes     Comment: does a lot of walking - 4x/week     Bike Helmet Not Asked     Seat Belt Yes     Comment: always     Self-Exams Yes     Comment: SBE encouraged monthly     Parent/sibling w/ CABG, MI or angioplasty before 65F 55M? Yes   Social History Narrative    calcium - drinks 5-6 large glasses skim milk/day    flex sig/colonoscopy -at age 49    sun precautions - discussed    mammogram - needs every 2 years in her 30's, then yearly from then on    Td booster - 9/99 and 4/27/2010    pneumovax -at age 59    DEXA -when perimenopausal    stool hemoccults - every year after age 40    ASA- start at age 40    mulvitamin - encouraged       FAMILY HISTORY:  Family History   Problem Relation Age of Onset     Breast Cancer Mother      Gastrointestinal Disease Mother      Heart Disease Father 57     Heart Disease Paternal Grandfather      Hypertension Maternal Grandmother      Diabetes Paternal Grandmother      Diabetes Maternal Grandfather      Cancer Sister        Past/family/social history reviewed and no changes    PHYSICAL EXAMINATION:  Constitutional: aaox3, cooperative, pleasant, not dyspneic/diaphoretic, no acute distress   Ht 1.651 m (5' 5\")   Wt 76.2 kg (168 lb)   LMP 05/31/2006   BMI 27.96 kg/m    Wt:   Wt Readings from Last 2 Encounters:   10/07/20 76.2 kg (168 lb)   09/04/20 77.5 kg (170 lb 14.4 oz)      Exam deferred as this was a telephone encounter due to the coronavirus outbreak.    PERTINENT STUDIES:  Most recent CBC:  Recent Labs   Lab Test 09/04/20  1413 05/15/20  1330   WBC 4.0 2.8*   HGB 13.9 13.5   HCT 40.7 38.9   * 102*     Most " recent hepatic panel:  Recent Labs   Lab Test 09/04/20  1413 05/15/20  1330   ALT 46 37   AST 62* 59*     Most recent creatinine:  Recent Labs   Lab Test 09/04/20  1413 04/25/20  0617   CR 0.73 0.67             Again, thank you for allowing me to participate in the care of your patient.        Sincerely,        Sheldon Serrato PA-C

## 2020-10-07 NOTE — PROGRESS NOTES
"Abiola Matute is a 55 year old female who is being evaluated via a billable video visit.      The patient has been notified of following:     \"This video visit will be conducted via a call between you and your physician/provider. We have found that certain health care needs can be provided without the need for an in-person physical exam.  This service lets us provide the care you need with a video conversation.  If a prescription is necessary we can send it directly to your pharmacy.  If lab work is needed we can place an order for that and you can then stop by our lab to have the test done at a later time.    Video visits are billed at different rates depending on your insurance coverage.  Please reach out to your insurance provider with any questions.    If during the course of the call the physician/provider feels a video visit is not appropriate, you will not be charged for this service.\"    Patient has given verbal consent for Video visit? Yes  How would you like to obtain your AVS? MyChart  If you are dropped from the video visit, the video invite should be resent to: Text to cell phone: [phone]  Will anyone else be joining your video visit? No        Video-Visit Details    Type of service:  Video Visit    Video Start Time: 9:52 AM  Video End Time: 10:14 AM    Originating Location (pt. Location): Home    Distant Location (provider location):  [facility] [place]     Platform used for Video Visit: Suma Serrato PA-C    IBD CLINIC VISIT    CC/REFERRING MD:  Referred Self  REASON FOR CONSULTATION: Crohn's.     ASSESSMENT/PLAN:  55 year old female with cirrhosis and Crohn's    1.  Crohn's disease: Mostly perianal fistula based on most recent endoscopic assessment 12/2019 (which was prior to dose increase). Symptomatically somewhat improved with increasing the dose of infliximab to 10 mg/kg, however has breakthrough symptoms as she approaches her infusion even " up to 3-4 weeks before.  Her level is quite low based on level obtained 1/2020 at 2.3, no antibodies present.  With the presence of perianal disease, she may require higher doses of infliximab. Given disease is limited to perianal area, we will reassess with MRI of pelvis to help identify some objective markers of active disease and likely move to every 4 week dosing.  -- MRI pelvis to be completed, then likely will move to every 4 week dosing pending findings.  -- Continue infliximab 10mg/kg every 8 weeks for now  --Laboratory studies to be completed every 3 months while on infliximab therapy to include CBC, LFTs, CRP and ESR     2. Cirrhosis: Some notes state cryptogenic, some notes state alcoholic. Recent variceal screening with EGD was unremarkable.  Patient is now established with hepatology with Dr. Cervantes.     3. IBD in setting of coronavirus pandemic: Patients on immunosuppressive drugs for IBD and autoimmune hepatitis should continue taking their medications. The risk of disease flare outweighs the chance of kendell coronavirus. These patients should also follow CDC guidelines for at-risk groups by avoiding crowds and limiting travel.     IBD HISTORY  Age at diagnosis: 53 (4/2019)  Extent of disease: miguel angel-anal, anal  Disease phenotype: Miguel Angel-anal fistula  Miguel Angel-anal disease: Yes  Current CD medications:  - Infliximab - started May 2019 (5mg/kg every 8 weeks)  Prior IBD surgeries: No  Prior IBD Medications:    DRUG MONITORING  TPMT enzyme activity:     6-TGN/6-MMPN levels:    Biologic concentration:  10/2/2019: IFX 0.8, anti-IFX antibodies: 451 (Esoterix)  1/22/2020 infliximab level 2.3, no antibodies (this is an 8-week trough at 10 mg/kg)    DISEASE ASSESSMENT  Labs  Recent Labs   Lab Test 05/15/20  1330 01/22/20  1055   CRP <2.9 6.7   SED 42* 59*     Fecal calprotectin: --  Endoscopy: Colonoscopy 12/5/2019 shows isolated perianal disease  Enterography: --  C diff: --    sIBDQ:   No flowsheet data  found.    IBD Health Care Maintenance:  Vaccinations:  All patients on biologics should avoid live vaccines.    -- Influenza (every year)  -- TdaP (every 10 years)  -- Pneumococcal Pneumonia (once plus booster at 5 years)  -- Yearly assessment for latent Tb (verbal screening and exam, PPD or QuantiFERON-Tb testing)    One time confirmation of immunity or serologies:  -- Hepatitis A (serologies or immunizations)  -- Hepatitis B (serologies or immunizations)  -- Varicella  -- MMR  -- HPV (all aged 18-26)  -- Meningococcal meningitis (all patients at risk for meningitis)  -- Due to the immunosuppression in this patient, I would not advise administration of live vaccines such as varicella/VZV, intranasal influenza, MMR, or yellow fever vaccine (if travelling).      Bone mineral density screening   -- Recommend all patients supplement with calcium and vitamin D  -- Consider DEXA if not already done    Cancer Screening:  Colon cancer screening:  Unclear colonic extent of disease at this time.      Cervical cancer screening: Per OBGYN    Skin cancer screening: Annual visual exam of skin by dermatologist since patient is immunocompromised    Depression Screening:  PHQ-2 Score:     PHQ-2 ( 1999 Pfizer) 9/4/2020 7/23/2019   Q1: Little interest or pleasure in doing things 0 0   Q2: Feeling down, depressed or hopeless 0 0   PHQ-2 Score 0 0   Q1: Little interest or pleasure in doing things Not at all -   Q2: Feeling down, depressed or hopeless Not at all -   PHQ-2 Score 0 -     Misc:  -- Avoid tobacco use  -- Avoid NSAIDs as there is potentially a 25% chance of causing an IBD flare    Return to clinic in 3 months    Thank you for this consultation.  It was a pleasure to participate in the care of this patient; please contact us with any further questions.     This note was created with voice recognition software, and while reviewed for accuracy, typos may remain.       Sheldon Serrato PA-C  Division of Gastroenterology,  Hepatology and Nutrition  [facility]       HPI:   Patient for follow up of perianal crohn's disease.  Patient additional has hx of alcoholic cirrhosis and follows with hepatology.      Previous hx includes:  She is a history of long-standing diarrhea as well as recurrent aphthous ulcers.  In April 2019 she was diagnosed with Crohn's disease due to perianal fistula.  She started infliximab due to perianal disease, and per colorectal surgery note had may be a 30% improvement in fistula output, however the patient stated she did not have improvement. Additionally, patient had an episode of cholecystitis - treated medically due to liver numbers.      Infliximab level had originally been obtained in oct 2019 showing 10/2/2019: IFX 0.8, anti-IFX antibodies: 451 (Esoterix)     Repeat Infliximab level obtained 11/12/19 was 2.3, no antibodies. Dose was optimized to 10mg/kg every 8 weeks. Colonoscopy 12/2019 showed isolated perianal disease, seton was in place (which has now fallen out).  A new level obtained 1/2020 was also 2.3, no antibodies. No changes at that time.    She did have an improvement with the dose change to 10mg/kg, however recently she notes that up to 3-4 weeks before her next infusion she will have return of canker sores, increased joint pain and an increase in frequency in her stools.  Often she will have a formed stool that is firm and difficult to evacuate followed by 6-7 loose stools the same day that are urgent.  No blood in the stool.  On rare occasions she will have uncontrolled fecal incontinence. She continues to have persistent perianal drainage which is quite bothersome.  Her next infusion is 10/29/20.    She notes cracking skin on lower back but well above anus within the crease.      She also recent had an ERCP with cystic and pancreatic placement + papillary sphincterotomy on 4/24/20 due to acute on chronic cholecystitis.      ROS:    No fevers or chills  No weight loss  No blurry  vision, double vision or change in vision  No sore throat  No lymphadenopathy  No headache, paraesthesias, or weakness in a limb  No shortness of breath or wheezing  No chest pain or pressure  No arthralgias or myalgias  No rashes or skin changes  No odynophagia or dysphagia  No BRBPR, hematochezia, melena  No dysuria, frequency or urgency  No hot/cold intolerance or polyria  No anxiety or depression    Extra intestinal manifestations of IBD:  No uveitis/episcleritis  + aphthous ulcers   + arthritis   No erythema nodosum/pyoderma gangrenosum.     PERTINENT PAST MEDICAL HISTORY:  Past Medical History:   Diagnosis Date     Alcohol abuse      Alcoholic cirrhosis of liver with ascites      Atypical ductal hyperplasia of breast 9/10/10    ERT not recommended -left - and flat epithelial atypia-scheduled for breast biopsy 9/17/2010      Bifid uvula      Cholelithiasis      Contact perianal dermatitis and other eczema     recurrent - clobetasol      Crohn disease      Fear of flying      - gets Ativan prn.      Hypertension      IBS (irritable bowel syndrome)        PREVIOUS SURGERIES:  Past Surgical History:   Procedure Laterality Date     BIOPSY ANAL N/A 3/28/2019    anal biopsy and culure placement of seton - Dr Fleming     BIOPSY BREAST Left 09/17/2010    - scheduled with Dr. Varma      BIOPSY LIVER  2019     C APPENDECTOMY  at age 14     COLONOSCOPY  2006     COLONOSCOPY N/A 12/23/2014    Procedure: COMBINED COLONOSCOPY, SINGLE OR MULTIPLE BIOPSY/POLYPECTOMY BY BIOPSY;  Surgeon: Diane Fleming MD;  Location:  GI     COLONOSCOPY N/A 12/5/2019    Procedure: COLONOSCOPY, WITH POLYPECTOMY AND BIOPSY;  Surgeon: Farhan Schilling MD;  Location:  GI     ENDOSCOPIC RETROGRADE CHOLANGIOPANCREATOGRAM N/A 4/24/2020    Procedure: ENDOSCOPIC RETROGRADE CHOLANGIOPANCREATOGRAPHY WITH, sledge removal,sphincterotomy, stent in gallbladder and pancreatic duct stent, and balloon dilation;  Surgeon: Guru Abena  Isac Lopez MD;  Location: [facility] OR     ESOPHAGOSCOPY, GASTROSCOPY, DUODENOSCOPY (EGD), COMBINED N/A 12/5/2019    Procedure: ESOPHAGOGASTRODUODENOSCOPY (EGD);  Surgeon: Farhan Schilling MD;  Location: [facility] GI     EXAM UNDER ANESTHESIA ANUS N/A 3/28/2019    Procedure: EXAM UNDER ANESTHESIA ANUS;  Surgeon: Diane Fleming MD;  Location: [facility] OR     HYSTERECTOMY, VAGINAL  2006    with Dr. Licha Zhou - with BSO for fibroids      IR GALLBLADDER DRAIN PLACEMENT  4/22/2020     OPEN REDUCTION INTERNAL FIXATION ANKLE Left at age 27    plates and screws removed at age 36     Pelviscopy with removal of bilateral hydrosalpinges.  04/15/2010       PREVIOUS ENDOSCOPY:  3/7/19: Flex sig: Colon and rectum appeared normal. 2 large deep ulcers extending from the anal cnaal to left perianal area.     10/23/19: Colonoscopy: Endoscopically normal ileum.  Mild pan colonic congestion with contact friability thought to be related to portal hypertension and thrombocytopenia.  Prior anal verge ulcer is healed    12/23/14: Colonoscopy: Perianal skin tag noted.  Ileum normal, colon normal.    ALLERGIES:     Allergies   Allergen Reactions     Fish Oil      Redness and itching around eye area only - went away when fish oil capsules stopped      Metronidazole      pain/itching     Naphthalenemethylamines      Lamisil = mild urticarial reaction     Ppd [Tuberculin Purified Protein Derivative]      Sulfa Drugs      hives       PERTINENT MEDICATIONS:    Current Outpatient Medications:      acetaminophen (TYLENOL) 325 MG tablet, Take 1-2 tablets (325-650 mg) by mouth every 6 hours as needed for mild pain Take either 325mg every 6 hours or 650mg every 8 hours for pain. Do not exceed 2000mg in 24 hours., Disp: 100 tablet, Rfl: 0     furosemide (LASIX) 40 MG tablet, Take 1 tablet (40 mg) by mouth daily, Disp: 90 tablet, Rfl: 3     Milk Thistle-Dand-Fennel-Licor (MILK THISTLE XTRA) CAPS capsule, Take 1 capsule by mouth daily, Disp: , Rfl:       Multiple Vitamins-Minerals (MULTIVITAMIN & MINERAL PO), Take  by mouth daily., Disp: , Rfl:      spironolactone (ALDACTONE) 100 MG tablet, Take 1 tablet (100 mg) by mouth daily, Disp: 90 tablet, Rfl: 3    SOCIAL HISTORY:  Social History     Socioeconomic History     Marital status:      Spouse name: Albino     Number of children: 3     Years of education: 14     Highest education level: Not on file   Occupational History     Occupation: [employer]     Comment:    Social Needs     Financial resource strain: Not very hard     Food insecurity     Worry: Never true     Inability: Never true     Transportation needs     Medical: No     Non-medical: No   Tobacco Use     Smoking status: Never Smoker     Smokeless tobacco: Never Used   Substance and Sexual Activity     Alcohol use: Yes     Alcohol/week: 0.0 standard drinks     Comment: occasional     Drug use: No     Comment: no herbal meds either     Sexual activity: Yes     Partners: Male     Birth control/protection: Surgical     Comment: harper had vasectomy   Lifestyle     Physical activity     Days per week: 0 days     Minutes per session: Not on file     Stress: Rather much   Relationships     Social connections     Talks on phone: More than three times a week     Gets together: More than three times a week     Attends [employer] service: Not on file     Active member of club or organization: Not on file     Attends meetings of clubs or organizations: Not on file     Relationship status: Not on file     Intimate partner violence     Fear of current or ex partner: Not on file     Emotionally abused: Not on file     Physically abused: Not on file     Forced sexual activity: Not on file   Other Topics Concern      Service No     Blood Transfusions No     Caffeine Concern No     Comment: rarely drinks caffeine     Occupational Exposure Not Asked     Hobby Hazards Not Asked     Sleep Concern Not Asked     Stress Concern Not Asked  "    Weight Concern Not Asked     Special Diet Not Asked     Back Care Not Asked     Exercise Yes     Comment: does a lot of walking - 4x/week     Bike Helmet Not Asked     Seat Belt Yes     Comment: always     Self-Exams Yes     Comment: SBE encouraged monthly     Parent/sibling w/ CABG, MI or angioplasty before 65F 55M? Yes   Social History Narrative    calcium - drinks 5-6 large glasses skim milk/day    flex sig/colonoscopy -at age 49    sun precautions - discussed    mammogram - needs every 2 years in her 30's, then yearly from then on    Td booster - 9/99 and 4/27/2010    pneumovax -at age 59    DEXA -when perimenopausal    stool hemoccults - every year after age 40    ASA- start at age 40    mulvitamin - encouraged       FAMILY HISTORY:  Family History   Problem Relation Age of Onset     Breast Cancer Mother      Gastrointestinal Disease Mother      Heart Disease Father 57     Heart Disease Paternal Grandfather      Hypertension Maternal Grandmother      Diabetes Paternal Grandmother      Diabetes Maternal Grandfather      Cancer Sister        Past/family/social history reviewed and no changes    PHYSICAL EXAMINATION:  Constitutional: aaox3, cooperative, pleasant, not dyspneic/diaphoretic, no acute distress   Ht 1.651 m (5' 5\")   Wt 76.2 kg (168 lb)   LMP 05/31/2006   BMI 27.96 kg/m    Wt:   Wt Readings from Last 2 Encounters:   10/07/20 76.2 kg (168 lb)   09/04/20 77.5 kg (170 lb 14.4 oz)      Exam deferred as this was a telephone encounter due to the coronavirus outbreak.    PERTINENT STUDIES:  Most recent CBC:  Recent Labs   Lab Test 09/04/20  1413 05/15/20  1330   WBC 4.0 2.8*   HGB 13.9 13.5   HCT 40.7 38.9   * 102*     Most recent hepatic panel:  Recent Labs   Lab Test 09/04/20  1413 05/15/20  1330   ALT 46 37   AST 62* 59*     Most recent creatinine:  Recent Labs   Lab Test 09/04/20  1413 04/25/20  0617   CR 0.73 0.67         "

## 2020-10-07 NOTE — LETTER
10/7/2020       RE: Abiola Matute  [address] [address]     Dear Colleague,    Thank you for referring your patient, Abiola Matute, to the [facility]STR[facility] [place] [facility]. Please see a copy of my visit note below.    IBD CLINIC VISIT    CC/REFERRING MD:  Referred Self  REASON FOR CONSULTATION: Crohn's.     ASSESSMENT/PLAN:  55 year old female with cirrhosis and Crohn's    1.  Crohn's disease: Mostly perianal fistula based on most recent endoscopic assessment 12/2019 (which was prior to dose increase). Symptomatically somewhat improved with increasing the dose of infliximab to 10 mg/kg, however has breakthrough symptoms as she approaches her infusion even up to 3-4 weeks before.  Her level is quite low based on level obtained 1/2020 at 2.3, no antibodies present.  With the presence of perianal disease, she may require higher doses of infliximab. Given disease is limited to perianal area, we will reassess with MRI of pelvis to help identify some objective markers of active disease and likely move to every 4 week dosing.  -- MRI pelvis to be completed, then likely will move to every 4 week dosing pending findings.  -- Continue infliximab 10mg/kg every 8 weeks for now  --Laboratory studies to be completed every 3 months while on infliximab therapy to include CBC, LFTs, CRP and ESR     2. Cirrhosis: Some notes state cryptogenic, some notes state alcoholic. Recent variceal screening with EGD was unremarkable.  Patient is now established with hepatology with Dr. Cervantes.     3. IBD in setting of coronavirus pandemic: Patients on immunosuppressive drugs for IBD and autoimmune hepatitis should continue taking their medications. The risk of disease flare outweighs the chance of kendell coronavirus. These patients should also follow CDC guidelines for at-risk groups by avoiding crowds and limiting travel.     IBD  HISTORY  Age at diagnosis: 53 (4/2019)  Extent of disease: miguel angel-anal, anal  Disease phenotype: Miguel Angel-anal fistula  Miguel Angel-anal disease: Yes  Current CD medications:  - Infliximab - started May 2019 (5mg/kg every 8 weeks)  Prior IBD surgeries: No  Prior IBD Medications:    DRUG MONITORING  TPMT enzyme activity:     6-TGN/6-MMPN levels:    Biologic concentration:  10/2/2019: IFX 0.8, anti-IFX antibodies: 451 (Esoterix)  1/22/2020 infliximab level 2.3, no antibodies (this is an 8-week trough at 10 mg/kg)    DISEASE ASSESSMENT  Labs  Recent Labs   Lab Test 05/15/20  1330 01/22/20  1055   CRP <2.9 6.7   SED 42* 59*     Fecal calprotectin: --  Endoscopy: Colonoscopy 12/5/2019 shows isolated perianal disease  Enterography: --  C diff: --    sIBDQ:   No flowsheet data found.    IBD Health Care Maintenance:  Vaccinations:  All patients on biologics should avoid live vaccines.    -- Influenza (every year)  -- TdaP (every 10 years)  -- Pneumococcal Pneumonia (once plus booster at 5 years)  -- Yearly assessment for latent Tb (verbal screening and exam, PPD or QuantiFERON-Tb testing)    One time confirmation of immunity or serologies:  -- Hepatitis A (serologies or immunizations)  -- Hepatitis B (serologies or immunizations)  -- Varicella  -- MMR  -- HPV (all aged 18-26)  -- Meningococcal meningitis (all patients at risk for meningitis)  -- Due to the immunosuppression in this patient, I would not advise administration of live vaccines such as varicella/VZV, intranasal influenza, MMR, or yellow fever vaccine (if travelling).      Bone mineral density screening   -- Recommend all patients supplement with calcium and vitamin D  -- Consider DEXA if not already done    Cancer Screening:  Colon cancer screening:  Unclear colonic extent of disease at this time.      Cervical cancer screening: Per OBGYN    Skin cancer screening: Annual visual exam of skin by dermatologist since patient is immunocompromised    Depression Screening:  PHQ-2  Score:     PHQ-2 ( 1999 Pfizer) 9/4/2020 7/23/2019   Q1: Little interest or pleasure in doing things 0 0   Q2: Feeling down, depressed or hopeless 0 0   PHQ-2 Score 0 0   Q1: Little interest or pleasure in doing things Not at all -   Q2: Feeling down, depressed or hopeless Not at all -   PHQ-2 Score 0 -     Misc:  -- Avoid tobacco use  -- Avoid NSAIDs as there is potentially a 25% chance of causing an IBD flare    Return to clinic in 3 months    Thank you for this consultation.  It was a pleasure to participate in the care of this patient; please contact us with any further questions.     This note was created with voice recognition software, and while reviewed for accuracy, typos may remain.     Sheldon Serrato PA-C  Division of Gastroenterology, Hepatology and Nutrition  [facility]     HPI:   Patient for follow up of perianal crohn's disease.  Patient additional has hx of alcoholic cirrhosis and follows with hepatology.      Previous hx includes:  She is a history of long-standing diarrhea as well as recurrent aphthous ulcers.  In April 2019 she was diagnosed with Crohn's disease due to perianal fistula.  She started infliximab due to perianal disease, and per colorectal surgery note had may be a 30% improvement in fistula output, however the patient stated she did not have improvement. Additionally, patient had an episode of cholecystitis - treated medically due to liver numbers.      Infliximab level had originally been obtained in oct 2019 showing 10/2/2019: IFX 0.8, anti-IFX antibodies: 451 (Esoterix)     Repeat Infliximab level obtained 11/12/19 was 2.3, no antibodies. Dose was optimized to 10mg/kg every 8 weeks. Colonoscopy 12/2019 showed isolated perianal disease, seton was in place (which has now fallen out).  A new level obtained 1/2020 was also 2.3, no antibodies. No changes at that time.    She did have an improvement with the dose change to 10mg/kg, however recently she notes that up to  3-4 weeks before her next infusion she will have return of canker sores, increased joint pain and an increase in frequency in her stools.  Often she will have a formed stool that is firm and difficult to evacuate followed by 6-7 loose stools the same day that are urgent.  No blood in the stool.  On rare occasions she will have uncontrolled fecal incontinence. She continues to have persistent perianal drainage which is quite bothersome.  Her next infusion is 10/29/20.    She notes cracking skin on lower back but well above anus within the crease.      She also recent had an ERCP with cystic and pancreatic placement + papillary sphincterotomy on 4/24/20 due to acute on chronic cholecystitis.      ROS:    No fevers or chills  No weight loss  No blurry vision, double vision or change in vision  No sore throat  No lymphadenopathy  No headache, paraesthesias, or weakness in a limb  No shortness of breath or wheezing  No chest pain or pressure  No arthralgias or myalgias  No rashes or skin changes  No odynophagia or dysphagia  No BRBPR, hematochezia, melena  No dysuria, frequency or urgency  No hot/cold intolerance or polyria  No anxiety or depression    Extra intestinal manifestations of IBD:  No uveitis/episcleritis  + aphthous ulcers   + arthritis   No erythema nodosum/pyoderma gangrenosum.     PERTINENT PAST MEDICAL HISTORY:  Past Medical History:   Diagnosis Date     Alcohol abuse      Alcoholic cirrhosis of liver with ascites      Atypical ductal hyperplasia of breast 9/10/10    ERT not recommended -left - and flat epithelial atypia-scheduled for breast biopsy 9/17/2010      Bifid uvula      Cholelithiasis      Contact perianal dermatitis and other eczema     recurrent - clobetasol      Crohn disease      Fear of flying      - gets Ativan prn.      Hypertension      IBS (irritable bowel syndrome)        PREVIOUS SURGERIES:  Past Surgical History:   Procedure Laterality Date     BIOPSY ANAL N/A 3/28/2019    anal  biopsy and culure placement of seton - Dr Fleming     BIOPSY BREAST Left 09/17/2010    - scheduled with Dr. Varma      BIOPSY LIVER  2019     C APPENDECTOMY  at age 14     COLONOSCOPY  2006     COLONOSCOPY N/A 12/23/2014    Procedure: COMBINED COLONOSCOPY, SINGLE OR MULTIPLE BIOPSY/POLYPECTOMY BY BIOPSY;  Surgeon: Diane Fleming MD;  Location:  GI     COLONOSCOPY N/A 12/5/2019    Procedure: COLONOSCOPY, WITH POLYPECTOMY AND BIOPSY;  Surgeon: Farhan Schilling MD;  Location: [facility]     ENDOSCOPIC RETROGRADE CHOLANGIOPANCREATOGRAM N/A 4/24/2020    Procedure: ENDOSCOPIC RETROGRADE CHOLANGIOPANCREATOGRAPHY WITH, sledge removal,sphincterotomy, stent in gallbladder and pancreatic duct stent, and balloon dilation;  Surgeon: Guru Isac Kraft MD;  Location:  OR     ESOPHAGOSCOPY, GASTROSCOPY, DUODENOSCOPY (EGD), COMBINED N/A 12/5/2019    Procedure: ESOPHAGOGASTRODUODENOSCOPY (EGD);  Surgeon: Farhan Schilling MD;  [facility]     EXAM UNDER ANESTHESIA ANUS N/A 3/28/2019    Procedure: EXAM UNDER ANESTHESIA ANUS;  Surgeon: Diane Fleming MD;  Location:  OR     HYSTERECTOMY, VAGINAL  2006    with Dr. Licha Zhou - with BSO for fibroids      IR GALLBLADDER DRAIN PLACEMENT  4/22/2020     OPEN REDUCTION INTERNAL FIXATION ANKLE Left at age 27    plates and screws removed at age 36     Pelviscopy with removal of bilateral hydrosalpinges.  04/15/2010       PREVIOUS ENDOSCOPY:  3/7/19: Flex sig: Colon and rectum appeared normal. 2 large deep ulcers extending from the anal cnaal to left perianal area.     10/23/19: Colonoscopy: Endoscopically normal ileum.  Mild pan colonic congestion with contact friability thought to be related to portal hypertension and thrombocytopenia.  Prior anal verge ulcer is healed    12/23/14: Colonoscopy: Perianal skin tag noted.  Ileum normal, colon normal.    ALLERGIES:     Allergies   Allergen Reactions     Fish Oil      Redness and itching around eye  area only - went away when fish oil capsules stopped      Metronidazole      pain/itching     Naphthalenemethylamines      Lamisil = mild urticarial reaction     Ppd [Tuberculin Purified Protein Derivative]      Sulfa Drugs      hives       PERTINENT MEDICATIONS:    Current Outpatient Medications:      acetaminophen (TYLENOL) 325 MG tablet, Take 1-2 tablets (325-650 mg) by mouth every 6 hours as needed for mild pain Take either 325mg every 6 hours or 650mg every 8 hours for pain. Do not exceed 2000mg in 24 hours., Disp: 100 tablet, Rfl: 0     furosemide (LASIX) 40 MG tablet, Take 1 tablet (40 mg) by mouth daily, Disp: 90 tablet, Rfl: 3     Milk Thistle-Dand-Fennel-Licor (MILK THISTLE XTRA) CAPS capsule, Take 1 capsule by mouth daily, Disp: , Rfl:      Multiple Vitamins-Minerals (MULTIVITAMIN & MINERAL PO), Take  by mouth daily., Disp: , Rfl:      spironolactone (ALDACTONE) 100 MG tablet, Take 1 tablet (100 mg) by mouth daily, Disp: 90 tablet, Rfl: 3    SOCIAL HISTORY:  Social History     Socioeconomic History     Marital status:      Spouse name: Albino     Number of children: 3     Years of education: 14     Highest education level: Not on file   Occupational History     Occupation: [employer]     Comment:    Social Needs     Financial resource strain: Not very hard     Food insecurity     Worry: Never true     Inability: Never true     Transportation needs     Medical: No     Non-medical: No   Tobacco Use     Smoking status: Never Smoker     Smokeless tobacco: Never Used   Substance and Sexual Activity     Alcohol use: Yes     Alcohol/week: 0.0 standard drinks     Comment: occasional     Drug use: No     Comment: no herbal meds either     Sexual activity: Yes     Partners: Male     Birth control/protection: Surgical     Comment: harper had vasectomy   Lifestyle     Physical activity     Days per week: 0 days     Minutes per session: Not on file     Stress: Rather much    Relationships     Social connections     Talks on phone: More than three times a week     Gets together: More than three times a week     Attends Jehovah's witness service: Not on file     Active member of club or organization: Not on file     Attends meetings of clubs or organizations: Not on file     Relationship status: Not on file     Intimate partner violence     Fear of current or ex partner: Not on file     Emotionally abused: Not on file     Physically abused: Not on file     Forced sexual activity: Not on file   Other Topics Concern      Service No     Blood Transfusions No     Caffeine Concern No     Comment: rarely drinks caffeine     Occupational Exposure Not Asked     Hobby Hazards Not Asked     Sleep Concern Not Asked     Stress Concern Not Asked     Weight Concern Not Asked     Special Diet Not Asked     Back Care Not Asked     Exercise Yes     Comment: does a lot of walking - 4x/week     Bike Helmet Not Asked     Seat Belt Yes     Comment: always     Self-Exams Yes     Comment: SBE encouraged monthly     Parent/sibling w/ CABG, MI or angioplasty before 65F 55M? Yes   Social History Narrative    calcium - drinks 5-6 large glasses skim milk/day    flex sig/colonoscopy -at age 49    sun precautions - discussed    mammogram - needs every 2 years in her 30's, then yearly from then on    Td booster - 9/99 and 4/27/2010    pneumovax -at age 59    DEXA -when perimenopausal    stool hemoccults - every year after age 40    ASA- start at age 39    mulvitamin - encouraged     FAMILY HISTORY:  Family History   Problem Relation Age of Onset     Breast Cancer Mother      Gastrointestinal Disease Mother      Heart Disease Father 57     Heart Disease Paternal Grandfather      Hypertension Maternal Grandmother      Diabetes Paternal Grandmother      Diabetes Maternal Grandfather      Cancer Sister      Past/family/social history reviewed and no changes    PHYSICAL EXAMINATION:  Constitutional: aaox3, cooperative,  "pleasant, not dyspneic/diaphoretic, no acute distress   Ht 1.651 m (5' 5\")   Wt 76.2 kg (168 lb)   LMP 05/31/2006   BMI 27.96 kg/m    Wt:   Wt Readings from Last 2 Encounters:   10/07/20 76.2 kg (168 lb)   09/04/20 77.5 kg (170 lb 14.4 oz)      Exam deferred as this was a telephone encounter due to the coronavirus outbreak.    PERTINENT STUDIES:  Most recent CBC:  Recent Labs   Lab Test 09/04/20  1413 05/15/20  1330   WBC 4.0 2.8*   HGB 13.9 13.5   HCT 40.7 38.9   * 102*             Most recent hepatic panel:  Recent Labs   Lab Test 09/04/20  1413 05/15/20  1330   ALT 46 37   AST 62* 59*     Most recent creatinine:  Recent Labs   Lab Test 09/04/20  1413 04/25/20  0617   CR 0.73 0.67       Again, thank you for allowing me to participate in the care of your patient.  Sincerely,    Sheldon Serrato PA-C  "

## 2020-10-08 ENCOUNTER — PATIENT OUTREACH (OUTPATIENT)
Dept: GASTROENTEROLOGY | Facility: CLINIC | Age: 55
End: 2020-10-08

## 2020-10-12 ENCOUNTER — PATIENT OUTREACH (OUTPATIENT)
Dept: GASTROENTEROLOGY | Facility: CLINIC | Age: 55
End: 2020-10-12

## 2020-10-12 NOTE — PROGRESS NOTES
FRANCO is working on patient's prior to switch to home infusion.   Left a message working on the prior   Pt should have mri as scheduled   My chart message to patient.

## 2020-10-15 ENCOUNTER — PATIENT OUTREACH (OUTPATIENT)
Dept: CARE COORDINATION | Facility: CLINIC | Age: 55
End: 2020-10-15

## 2020-10-15 NOTE — PROGRESS NOTES
Clinic Care Coordination Contact  [facility]/Voicemail       Clinical Data: Care Coordinator Outreach  Outreach attempted x 1.  Left message on patient's voicemail with call back information and requested return call.    Plan: Care Coordinator will try to reach patient again in 10 business days.    REY Hensley  Clinic Care Coordination  [facility] Clinics : [facility], [place], and [place]  Phone: [phone]    ______________________  CHW delegation from SHYAM Carrillo RN, on 09/15/20:  Can you check in with this Patient in three weeks to see if she was able to accessed information on applying for social security benefits?     10/15:  LMTCB   ______________________  Next Outreach:  10/28/20  Planned Outreach Frequency: Monthly  Preferred Phone Number: [phone]    Enrollment Date:  09/15/20  Last Care Plan Assessment:  09/15/20

## 2020-10-21 ENCOUNTER — PATIENT OUTREACH (OUTPATIENT)
Dept: GASTROENTEROLOGY | Facility: CLINIC | Age: 55
End: 2020-10-21

## 2020-10-21 DIAGNOSIS — K50.113 CROHN'S DISEASE OF LARGE INTESTINE WITH FISTULA (H): Primary | ICD-10-CM

## 2020-10-21 RX ORDER — LORAZEPAM 2 MG/1
TABLET ORAL
Qty: 1 TABLET | Refills: 0 | Status: SHIPPED | OUTPATIENT
Start: 2020-10-21 | End: 2021-02-23

## 2020-10-22 ENCOUNTER — HOME INFUSION (PRE-WILLOW HOME INFUSION) (OUTPATIENT)
Dept: PHARMACY | Facility: CLINIC | Age: 55
End: 2020-10-22

## 2020-10-23 ENCOUNTER — HOME INFUSION (PRE-WILLOW HOME INFUSION) (OUTPATIENT)
Dept: PHARMACY | Facility: CLINIC | Age: 55
End: 2020-10-23

## 2020-10-23 ENCOUNTER — HOSPITAL ENCOUNTER (OUTPATIENT)
Dept: MRI IMAGING | Facility: CLINIC | Age: 55
Discharge: HOME | End: 2020-10-23
Attending: PHYSICIAN ASSISTANT | Admitting: PHYSICIAN ASSISTANT
Payer: COMMERCIAL

## 2020-10-23 DIAGNOSIS — K50.113 CROHN'S DISEASE OF LARGE INTESTINE WITH FISTULA (H): ICD-10-CM

## 2020-10-23 PROCEDURE — 72197 MRI PELVIS W/O & W/DYE: CPT

## 2020-10-23 PROCEDURE — 255N000002 HC RX 255 OP 636: Performed by: PHYSICIAN ASSISTANT

## 2020-10-23 PROCEDURE — A9585 GADOBUTROL INJECTION: HCPCS | Performed by: PHYSICIAN ASSISTANT

## 2020-10-23 RX ORDER — GADOBUTROL 604.72 MG/ML
7.5 INJECTION INTRAVENOUS ONCE
Status: COMPLETED | OUTPATIENT
Start: 2020-10-23 | End: 2020-10-23

## 2020-10-23 RX ADMIN — GADOBUTROL 7.5 ML: 604.72 INJECTION INTRAVENOUS at 14:30

## 2020-10-23 NOTE — PROGRESS NOTES
This is a recent snapshot of the patient's [facility] medical record.  For current drug dose and complete information and questions, call [phone] or In Basket pool, fv home infusion ([id number])  CSN Number:  [id number]

## 2020-10-26 ENCOUNTER — HOME INFUSION (PRE-WILLOW HOME INFUSION) (OUTPATIENT)
Dept: PHARMACY | Facility: CLINIC | Age: 55
End: 2020-10-26

## 2020-10-26 NOTE — PROGRESS NOTES
This is a recent snapshot of the patient's [facility] medical record.  For current drug dose and complete information and questions, call [phone] or In Basket pool, fv home infusion ([id number])  CSN Number:  [id number]

## 2020-10-27 ENCOUNTER — PATIENT OUTREACH (OUTPATIENT)
Dept: GASTROENTEROLOGY | Facility: CLINIC | Age: 55
End: 2020-10-27

## 2020-10-27 ENCOUNTER — MEDICAL CORRESPONDENCE (OUTPATIENT)
Dept: HEALTH INFORMATION MANAGEMENT | Facility: CLINIC | Age: 55
End: 2020-10-27

## 2020-10-27 ENCOUNTER — HOME INFUSION (PRE-WILLOW HOME INFUSION) (OUTPATIENT)
Dept: PHARMACY | Facility: CLINIC | Age: 55
End: 2020-10-27

## 2020-10-27 ENCOUNTER — TELEPHONE (OUTPATIENT)
Dept: PHARMACY | Facility: CLINIC | Age: 55
End: 2020-10-27

## 2020-10-27 ENCOUNTER — DOCUMENTATION ONLY (OUTPATIENT)
Dept: PHARMACY | Facility: CLINIC | Age: 55
End: 2020-10-27

## 2020-10-27 ENCOUNTER — TELEPHONE (OUTPATIENT)
Dept: GASTROENTEROLOGY | Facility: CLINIC | Age: 55
End: 2020-10-27

## 2020-10-27 DIAGNOSIS — K50.113 CROHN'S DISEASE OF LARGE INTESTINE WITH FISTULA (H): Primary | ICD-10-CM

## 2020-10-27 LAB
ALBUMIN SERPL-MCNC: 3.3 G/DL (ref 3.4–5)
ALP SERPL-CCNC: 129 U/L (ref 40–150)
ALT SERPL W P-5'-P-CCNC: 40 U/L (ref 0–50)
AST SERPL W P-5'-P-CCNC: 54 U/L (ref 0–45)
BASOPHILS # BLD AUTO: 0 10E9/L (ref 0–0.2)
BASOPHILS NFR BLD AUTO: 0.9 %
BILIRUB DIRECT SERPL-MCNC: 0.6 MG/DL (ref 0–0.2)
BILIRUB SERPL-MCNC: 1.9 MG/DL (ref 0.2–1.3)
CRP SERPL-MCNC: 3.4 MG/L (ref 0–8)
DIFFERENTIAL METHOD BLD: ABNORMAL
EOSINOPHIL # BLD AUTO: 0.3 10E9/L (ref 0–0.7)
EOSINOPHIL NFR BLD AUTO: 7.4 %
ERYTHROCYTE [DISTWIDTH] IN BLOOD BY AUTOMATED COUNT: 14.1 % (ref 10–15)
ERYTHROCYTE [SEDIMENTATION RATE] IN BLOOD BY WESTERGREN METHOD: 29 MM/H (ref 0–30)
HCT VFR BLD AUTO: 39.5 % (ref 35–47)
HGB BLD-MCNC: 13.7 G/DL (ref 11.7–15.7)
IMM GRANULOCYTES # BLD: 0 10E9/L (ref 0–0.4)
IMM GRANULOCYTES NFR BLD: 0.3 %
INR PPP: 1.28 (ref 0.86–1.14)
LYMPHOCYTES # BLD AUTO: 1.1 10E9/L (ref 0.8–5.3)
LYMPHOCYTES NFR BLD AUTO: 30.8 %
MCH RBC QN AUTO: 35.4 PG (ref 26.5–33)
MCHC RBC AUTO-ENTMCNC: 34.7 G/DL (ref 31.5–36.5)
MCV RBC AUTO: 102 FL (ref 78–100)
MONOCYTES # BLD AUTO: 0.4 10E9/L (ref 0–1.3)
MONOCYTES NFR BLD AUTO: 11.1 %
NEUTROPHILS # BLD AUTO: 1.7 10E9/L (ref 1.6–8.3)
NEUTROPHILS NFR BLD AUTO: 49.5 %
NRBC # BLD AUTO: 0 10*3/UL
NRBC BLD AUTO-RTO: 0 /100
PLATELET # BLD AUTO: 96 10E9/L (ref 150–450)
PROT SERPL-MCNC: 7.9 G/DL (ref 6.8–8.8)
RBC # BLD AUTO: 3.87 10E12/L (ref 3.8–5.2)
WBC # BLD AUTO: 3.5 10E9/L (ref 4–11)

## 2020-10-27 PROCEDURE — 85610 PROTHROMBIN TIME: CPT | Performed by: INTERNAL MEDICINE

## 2020-10-27 PROCEDURE — 80076 HEPATIC FUNCTION PANEL: CPT | Performed by: INTERNAL MEDICINE

## 2020-10-27 PROCEDURE — 85025 COMPLETE CBC W/AUTO DIFF WBC: CPT | Performed by: INTERNAL MEDICINE

## 2020-10-27 PROCEDURE — 86140 C-REACTIVE PROTEIN: CPT | Performed by: INTERNAL MEDICINE

## 2020-10-27 PROCEDURE — 85652 RBC SED RATE AUTOMATED: CPT | Performed by: INTERNAL MEDICINE

## 2020-10-27 RX ORDER — ACETAMINOPHEN 325 MG/1
650 TABLET ORAL ONCE
Status: CANCELLED
Start: 2020-10-27 | End: 2020-10-27

## 2020-10-27 RX ORDER — DIPHENHYDRAMINE HCL 25 MG
25 CAPSULE ORAL ONCE
Status: CANCELLED
Start: 2020-10-27 | End: 2020-10-27

## 2020-10-27 NOTE — PROGRESS NOTES
Therapy plan for remicade to    New plan entered with interval change to every 28 days  Standing lab orders    To be drawn day of  infusion prior to start of infusion every other infusion       Contacted [facility] prior to start process  for every 28 days.   Also contacted [facility] pharmacist Ngozi about the change in intervals.

## 2020-10-27 NOTE — TELEPHONE ENCOUNTER
[facility] PRIOR AUTHORIZATION REQUEST     Drug including DOSE: REMICADE 800 mg IV Every 4 Weeks  (frequency increase  q8wks to q4wks)   J Code: ,  and 54781  NDC: [address]-58  ICD 10 code: K50.119    Date(s) of Service: 10/27/20    Insurance Name: [employer] (AUTH GOES TO [facility])  Insurance ID:[id number]    Provider: KANDI MTZ MD  Provider NPI: [id number]          [facility]  NPI: [id number]

## 2020-10-27 NOTE — TELEPHONE ENCOUNTER
Verbal INR lab order given.      Talia GARCIA LPN  Hepatology Clinic    [facility]    Phone Message    May a detailed message be left on voicemail: yes     Reason for Call: Order(s): Other:   Reason for requested: Nurse requesting for an INR lab order. Is okay with a verbal order. Number to call: [phone]. Wants to know what number to call to get the results as well. Thanks!  Date needed: asap  Provider name: Dr. Cervantes      Action Taken: Message routed to:  [facility] hep    Travel Screening: Not Applicable

## 2020-10-27 NOTE — PROGRESS NOTES
Skilled Nurse visit in the  patient home to administer Remicade (infliximab) 800 mg (based on a dosage of 10 mg/kg, a weight of 76.2 kg, and rounded to the nearest commercially available vial size) in 250 mL of sodium chloride 0.9% IV via CADD pump over approximately 1 hour every 8 weeksRate: 250 ml/hour  No recent elevated temperature, fever, chills, productive cough, coughing for 3 weeks or longer or hemoptysis, abnormal vital signs, night sweats, chest pain. No  decrease in your appetite, unexplained weight loss or fatigue.  No other new onset medical symptoms.  Current weight 168 lbs  PIV placed left hand , 1 attempt.  Labs drawn INR, Hepatic Panel, CRP, ESR and CBC with platelets differential . Infusion completed without complication or reaction. Pt reports therapy is effective  in managing symptoms related to therapy.      Deysi James RN, BSN  [facility]  [phone]  [email or web address]

## 2020-10-28 NOTE — TELEPHONE ENCOUNTER
PA Initiation    Medication: REMICADE 800 mg IV Every 4 Weeks (FREQUENCY INCREASE) FROM Q8WKS  Insurance Company: [employer] ([facility]) - Phone [phone] Fax [phone]  [facility]  Filling Pharmacy Phone:    Filling Pharmacy Fax:    Start Date: 10/28/2020    Central Prior Authorization Team   Phone: [phone]

## 2020-10-28 NOTE — PROGRESS NOTES
This is a recent snapshot of the patient's [facility] medical record.  For current drug dose and complete information and questions, call [phone] or In Basket pool, fv home infusion ([id number])  CSN Number:  [id number]

## 2020-10-29 NOTE — TELEPHONE ENCOUNTER
Prior Authorization Not Needed per Insurance    Medication: REMICADE 800 mg IV Every 4 Weeks (FREQUENCY INCREASE) FROM Q8WKS  Insurance Company: [employer] ([facility]) - Phone [phone] Fax [phone]  Pharmacy Filling the Rx: [facility]    Called [employer] to make sure with the increase this would be covered still per representative looks like this would be billed under medical. New case submitted under medical benefits.

## 2020-10-29 NOTE — PROGRESS NOTES
This is a recent snapshot of the patient's [facility] medical record.  For current drug dose and complete information and questions, call [phone] or In Basket pool, fv home infusion ([id number])  CSN Number:  [id number]

## 2020-10-29 NOTE — TELEPHONE ENCOUNTER
PA Initiation    Medication: REMICADE 800 mg IV Every 4 Weeks (FREQUENCY INCREASE) FROM Q8WKS  Insurance Company: [employer] ([facility]) - Phone [phone] Fax [phone]  Pharmacy Filling the Rx: [facility]  Filling Pharmacy Phone:    Filling Pharmacy Fax:    Start Date: 10/28/2020    Request Number  [id number]

## 2020-10-30 NOTE — TELEPHONE ENCOUNTER
Received a voicemail from Marlys at [facility] (Ph# [phone], ext [phone]) she stated she has a 'bar' requirement because of her insurance plan- that requires her drug to be filled by [facility] and it will have to be transferred there before she can go forward with the review, a site of care change is needed. Checking with [facility].

## 2020-11-01 ENCOUNTER — APPOINTMENT (OUTPATIENT)
Dept: LAB | Facility: CLINIC | Age: 55
End: 2020-11-01
Attending: INTERNAL MEDICINE
Payer: COMMERCIAL

## 2020-11-03 ENCOUNTER — PATIENT OUTREACH (OUTPATIENT)
Dept: GASTROENTEROLOGY | Facility: CLINIC | Age: 55
End: 2020-11-03

## 2020-11-03 NOTE — PROGRESS NOTES
[facility]ed [facility]   Per [facility] prior patient has to transition to [facility] for infusions.   Order given to Susan Noe   Will send over to the home infusion team for prior authorization  [facility] will reach out to patient  Patient due next on November 20  Pharmacy number .

## 2020-11-04 ENCOUNTER — TELEPHONE (OUTPATIENT)
Dept: GASTROENTEROLOGY | Facility: CLINIC | Age: 55
End: 2020-11-04

## 2020-11-04 ENCOUNTER — HOME INFUSION (PRE-WILLOW HOME INFUSION) (OUTPATIENT)
Dept: PHARMACY | Facility: CLINIC | Age: 55
End: 2020-11-04

## 2020-11-04 NOTE — TELEPHONE ENCOUNTER
[facility] Call Center    Phone Message    May a detailed message be left on voicemail: yes     Reason for Call: Order(s): Home Care Orders: Other: Cheyenne is calling in regards to pt order for infusion. Cheyenne stated that she only received the cover sheet for the order and nothing  else. Cheyenne will need to have another sent over inorder to set pt up with these infusions. Please follow up when available. Thank you     Action Taken: Message routed to:  [facility] Gastro    Travel Screening: Not Applicable

## 2020-11-05 ENCOUNTER — DOCUMENTATION ONLY (OUTPATIENT)
Dept: GASTROENTEROLOGY | Facility: CLINIC | Age: 55
End: 2020-11-05

## 2020-11-05 NOTE — PROGRESS NOTES
Per request (Diane KEITA RN)    Therapy plan, Office notes from Shakir and toni, and demographics sheet faxed to [facility] ([facility]) at .      Lefty Lainez LPN

## 2020-11-06 ENCOUNTER — PATIENT OUTREACH (OUTPATIENT)
Dept: CARE COORDINATION | Facility: CLINIC | Age: 55
End: 2020-11-06

## 2020-11-06 NOTE — PROGRESS NOTES
This is a recent snapshot of the patient's [facility] medical record.  For current drug dose and complete information and questions, call [phone] or In Basket pool, fv home infusion ([id number])  CSN Number:  [id number]

## 2020-11-06 NOTE — PROGRESS NOTES
Clinic Care Coordination Contact  [facility]/Voicemail       Clinical Data: Care Coordinator Outreach  Outreach attempted x 2.  Left message on patient's voicemail with call back information and requested return call.    Plan:  Care Coordinator will try to reach patient again in 1 month.    REY Hensley  Clinic Care Coordination  [facility] Clinics : [place], [place], [place], and [place]  Phone: [phone]    ______________________  CHW delegation from SHYAM Carrillo RN, on 09/15/20:  Can you check in with this Patient in three weeks to see if she was able to accessed information on applying for social security benefits?     10/15:  HANK     11/06:  LMTCB #2  ______________________  Next Outreach:  12/01/20  Planned Outreach Frequency: Monthly  Preferred Phone Number: [phone]    Enrollment Date:  09/15/20  Last Care Plan Assessment:  09/15/20

## 2020-11-10 ENCOUNTER — HOME INFUSION (PRE-WILLOW HOME INFUSION) (OUTPATIENT)
Dept: PHARMACY | Facility: CLINIC | Age: 55
End: 2020-11-10

## 2020-11-11 NOTE — PROGRESS NOTES
This is a recent snapshot of the patient's [facility] medical record.  For current drug dose and complete information and questions, call [phone] or In Basket pool, fv home infusion ([id number])  CSN Number:  [id number]

## 2020-11-24 NOTE — TELEPHONE ENCOUNTER
CHW documentation reviewed. Appropriate for disenrollment if unable to reach at next outreach attempt.     Cathryn Boothe, [facility]  Clinic Care Coordinator  Ph. [phone]  [email or web address]

## 2020-12-01 ENCOUNTER — TELEPHONE (OUTPATIENT)
Dept: GASTROENTEROLOGY | Facility: CLINIC | Age: 55
End: 2020-12-01

## 2020-12-01 NOTE — TELEPHONE ENCOUNTER
[facility] Call [facility]    Phone Message    May a detailed message be left on voicemail: yes     Reason for Call: Other:  Cheyenne at [facility] called as patient has an appt for Remicade infusion on 12/2/20 at 2pm.   They still need to receive a copy of the TB, Hep B, full orders for Remicade infusion, and orders for Rapid Infusion.  Please fax to them at [phone].  Any questions, please call Cheyenne at [phone].  Thank you!    Action Taken: Message routed to:  [facility] Hepatology Adult [facility]    Travel Screening: Not Applicable

## 2020-12-02 ENCOUNTER — TELEPHONE (OUTPATIENT)
Dept: GASTROENTEROLOGY | Facility: CLINIC | Age: 55
End: 2020-12-02

## 2020-12-02 ENCOUNTER — PATIENT OUTREACH (OUTPATIENT)
Dept: GASTROENTEROLOGY | Facility: CLINIC | Age: 55
End: 2020-12-02

## 2020-12-02 NOTE — TELEPHONE ENCOUNTER
[facility] Call [facility]    Phone Message    May a detailed message be left on voicemail: yes     Reason for Call: Other: Sarah calling in again stating that they are in need of this by today, 12/02/2020, as pt is due for an infusion tomorrow, 12/03/2020. Please follow up when available. Thank you      Action Taken: Message routed to:  [facility] Gastro    Travel Screening: Not Applicable

## 2020-12-02 NOTE — TELEPHONE ENCOUNTER
[facility] Call [facility]    Phone Message    May a detailed message be left on voicemail: yes     Reason for Call: Other: Sarah, with [facility], calling in to discuss forms that were sent over. She stated that they are the wromg ones and she is needing a call back to discuss exactly what is needed. Pleae follow up0 as they have been trying for a while to get these for the pt. Thank you      Action Taken: Message routed to:  [facility] Gastro    Travel Screening: Not Applicable

## 2020-12-02 NOTE — TELEPHONE ENCOUNTER
[facility] Call [facility]    Phone Message    May a detailed message be left on voicemail: yes     Reason for Call: Other: Cheyenne and Sarah at [facility] have been calling about orders for this patient.  Message of 12/1/20 was sent to wrong Provider.  Please fax a copy of the TB, Hep B, full orders for the Remicade infusion, and orders for Rapid Infusion to them at [phone].  ANy questions, please call Cheyenne at [phone].  Thank you!      Action Taken: Message routed to:  [facility] Gastro [facility]    Travel Screening: Not Applicable

## 2020-12-02 NOTE — TELEPHONE ENCOUNTER
Copy of the Hepatic panel and full orders for the Remicade infusion, and orders for Rapid Infusion faxed to [phone]. Attn: Manfred. Confirmed with Diane KEITA RN, no TB done.    Lefty Lainez LPN

## 2020-12-03 NOTE — TELEPHONE ENCOUNTER
Spoke to Sarah from [facility] and faxed over requested information of Hepatitis lab results to fax number [phone]

## 2020-12-11 ENCOUNTER — PATIENT OUTREACH (OUTPATIENT)
Dept: CARE COORDINATION | Facility: CLINIC | Age: 55
End: 2020-12-11

## 2020-12-11 NOTE — LETTER
[place] CARE COORDINATION  [address]  [place] [address]  December 11, 2020      Abiola Matute  [address] [facility] [address]      Dear Abiola,    I have been unsuccessful in reaching you since our last contact. At this time the Care Coordination team will make no further attempts to reach you, however this does not change your ability to continue receiving care from your providers at your primary care clinic. If you need additional support from a care coordinator in the future please contact me at [phone].    All of us at [facility] are invested in your health and are here to assist you in meeting your goals.     Sincerely,    Tanner Baltazar, [facility]  Clinic Care Coordination  [facility] : [place], and [place]  Phone: [phone]

## 2020-12-11 NOTE — PROGRESS NOTES
Clinic Care Coordination Contact  [facility]/Voicemail       Clinical Data: Care Coordinator Outreach  Outreach attempted x 3.  Left message on patient's voicemail with call back information and requested return call.    Plan: Care Coordinator will send disenrollment letter with care coordinator contact information via [employer]. Care Coordinator will do no further outreaches at this time.    REY Hensley  Clinic Care Coordination  [facility] : [place], [place], [place], and [place]  Phone: [phone]

## 2021-01-05 ENCOUNTER — TRANSFERRED RECORDS (OUTPATIENT)
Dept: HEALTH INFORMATION MANAGEMENT | Facility: CLINIC | Age: 56
End: 2021-01-05

## 2021-01-12 ENCOUNTER — PATIENT OUTREACH (OUTPATIENT)
Dept: NURSING | Facility: CLINIC | Age: 56
End: 2021-01-12
Payer: COMMERCIAL

## 2021-01-12 DIAGNOSIS — Z71.89 COUNSELING AND COORDINATION OF CARE: Primary | ICD-10-CM

## 2021-01-12 NOTE — TELEPHONE ENCOUNTER
CHW documentation reviewed. New Care Coordination order entered on this date.     MARKUS Valencia  Clinic Care Coordinator  Ph. [phone]  [email or web address]

## 2021-01-12 NOTE — PROGRESS NOTES
Clinic Care Coordination Contact  Community Health Worker Initial Outreach    CHW Initial Information Gathering:  Referral Source: Self-patient/Caregiver  Preferred Hospital: [facility] [place]  [phone]  Current living arrangement:: Not Assessed  Informal Support system:: Children, Family, Friends, Spouse  No PCP office visit in Past Year: No  CHW Additional Questions  If ED/Hospital discharge, follow-up appointment scheduled as recommended?: N/A  Medication changes made following ED/Hospital discharge?: N/A  MyChart active?: Yes  Patient sent Social Determinants of Health questionnaire?: Yes    Patient accepts CC: Yes. Patient scheduled for assessment with SHYAM Valencia , on 01/13 at 1:30PM. Patient noted desire to discuss CC.     Spoke to Virginia Coleman returned writers phone call from previous voice messsages.  Virginia is currently unemployed and needing assistance on how or qualifications to apply for disability.    CHW offered assessment with CC to assess for support and resources.  Patient agreed with follow up.    REY Hensley  Clinic Care Coordination  [facility] : [place], [place], and [place]  Phone: [phone]

## 2021-01-13 ENCOUNTER — TELEPHONE (OUTPATIENT)
Dept: SURGERY | Facility: CLINIC | Age: 56
End: 2021-01-13

## 2021-01-13 ENCOUNTER — PATIENT OUTREACH (OUTPATIENT)
Dept: NURSING | Facility: CLINIC | Age: 56
End: 2021-01-13
Payer: COMMERCIAL

## 2021-01-13 NOTE — TELEPHONE ENCOUNTER
[facility] Call [facility]    Phone Message    May a detailed message be left on voicemail: yes     Reason for Call: Symptoms or Concerns     If patient has red-flag symptoms, warm transfer to triage line    Current symptom or concern: Pt stated she is getting more and more in discomfort with anal fistulas. Wants to speak with care team before deciding to schedule or not.     Symptoms have been present for:  2 week(s)    Has patient previously been seen for this? Yes    By : Josue Vazquez    Date: 5/20/2020    Are there any new or worsening symptoms? Yes: Pt is in more discomfort      Action Taken: Message routed to:  [facility] Colon Rect Surg    Travel Screening: Not Applicable

## 2021-01-13 NOTE — TELEPHONE ENCOUNTER
"Per Lisa Marcial NP's last note \"We discussed replacing the seton versus seeing how she does without it.  However, if symptoms return would recommend a 3T pelvic MRI and would consider seton replacement.  However, if perianal Crohn's disease remains fairly asymptomatic, no further intervention is necessary at this time.\"     MRI on 10/23:                                                 IMPRESSION:   1.  Mild bowel wall thickening and mucosal hyperenhancement in the  visualized sigmoid colon and rectum, suspicious for an infectious or  inflammatory proctocolitis.  2.  There is a probable intersphincteric perianal fistula extending  inferiorly and posteriorly from the anus on the left at 3:00, and  likely contacting the skin of the gluteal cleft on the left.  3.  No evidence for associated abscess.  4.  Trace amount of nonspecific free fluid in the pelvis.    Patient states in the last 2-3 weeks she has had increased pain to her fistula site. She states she is two fistulas close together and feels like the skin has \"raised\" between the fistula sites. She denies drainage. Discussed with Lisa Marcial NP. We would like to see patient back to double check she does not have an abscess. Will most likely need seton replaced. Set up visit with Lisa Marcial NP   "

## 2021-01-13 NOTE — LETTER
Counts include 234 beds at the [facility]  Complex Care Plan  About Me:    Patient Name:  Abiola Matute    YOB: 1965  Age:         55 year old   [place] MRN:    [id number] Telephone Information:  Home Phone [phone]   Mobile [phone]       Address:  [address] [place] [address] Email address:  [email or web address]      Emergency Contact(s)    Name Relationship Lgl Grd Work Phone Home Phone Mobile Phone   1. ANDERS, JOON Spouse   [phone]   2. WIL ALBARRAN Sister   [phone]           Primary language:  English     needed? No   [place] [facility]:  [phone] op. 1  Other communication barriers: None  Preferred Method of Communication:  Mail  Current living arrangement: I live in a private home  Mobility Status/ Medical Equipment: Independent    Health Maintenance  Health Maintenance Reviewed: Due/Overdue   Health Maintenance Due   Topic Date Due     HEPATITIS B IMMUNIZATION (1 of 3 - Risk 3-dose series) 07/02/1983     PHQ-2  01/01/2021     MAMMO SCREENING  01/16/2021       My Access Plan  Medical Emergency 911   Primary Clinic Line [facility] - [phone]   24 Hour Appointment Line [phone] or  [phone]) (toll-free)   24 Hour Nurse Line [phone] (toll-free)   Preferred Urgent Care     Preferred Hospital [facility]  [phone]   Preferred Pharmacy [facility] [address] [facility]     Behavioral Health Crisis Line The National Suicide Prevention Lifeline at 1-365.586.8947 or 911       My Care Team Members  Patient Care Team       Relationship Specialty Notifications Start End    Clinic, [place] PCP - General   10/23/20     Phone: [phone] Fax: [phone] [address] [place] [address]    Linda Macdonald APRN CNP Assigned PCP   8/4/19     Phone: [phone] Fax: [phone]  [facility] [address]    Diane Mckay, RN Specialty Care Coordinator Gastroenterology  11/29/19     Phone: [phone]         Sonia Pacheco [facility] Pharmacist Pharmacist Ambulatory Care  4/7/20     Phone: [phone]          [facility] [address] [facility] [address]    Wilner Estrada MD Assigned Surgical Provider   10/23/20     Phone: [phone] Fax: [phone]         [address] [facility] [address]    Jeannette Cervantes MD Assigned Gastroenterology Provider   10/23/20     Phone: [phone] Fax: [phone]         [address] [address]    Cathryn Goss LGSW Lead Care Coordinator   1/13/21     Tanner Lamb Community Health Worker   1/13/21             My Care Plans  Self Management and Treatment Plan  Goals and (Comments)  Goals        General    1. Psychosocial (pt-stated)     Notes - Note created  1/13/2021  2:24 PM by Cathryn Goss LGSW    Goal Statement: I want to apply for Social Security Disability within 6 months.   Date Goal set: 1.13.2021  Barriers: Navigating application/paperwork.  Strengths: Recognition of need, willingness to collaborate with Disability Specialists.   Date to Achieve By: 7.13.2021  Patient expressed understanding of goal: Pt reports understanding and denies any additional questions or concerns at this times. SENTHIL HOWE engaged in AIDET communication during encounter.    Action steps to achieve this goal:  1. I will review the letter sent by SENTHIL HOWE.   2. I will contact Disability Specialists.   3. I will work with Disability Specialists to apply for SSDI.                 My Medical and Care Information  Problem List   Patient Active Problem List   Diagnosis     Non morbid obesity due to excess calories     Other isolated or specific phobias     Other congenital malformations of mouth     Contact dermatitis and other eczema, due to unspecified cause     Intestinal infection due to  Clostridium difficile     Iron deficiency anemia, unspecified iron deficiency anemia type     Rosacea     Atypical ductal hyperplasia of left breast     Idiopathic thrombocytopenia (H)     Postmenopausal- s/p hysterectomy with BSO - not on HRT secondary to atypical ductal hyperplasia & breast pain -no paps needed     Claustrophobia     S/P total hysterectomy and bilateral salpingo-oophorectomy     Abnormal liver enzymes- ? related to ongoing etoh use/abuse     Essential hypertension with goal blood pressure less than 140/90     Gastroenteritis     Stool incontinence     CARDIOVASCULAR SCREENING; LDL GOAL LESS THAN 130     AA (alcohol abuse) - ? ongoing      Alcoholic fatty liver     Acquired hyperbilirubinemia- ? secondary to alcohol use     Diffuse nodular cirrhosis of liver (H)     Severe Perianal Crohn's Disease     Biliary colic     Cholecystitis     Alcoholic cirrhosis of liver with ascites (H) - seeing [facility]       Current Medications and Allergies:  See printed Medication Report.  Current Outpatient Medications   Medication Instructions     acetaminophen (TYLENOL) 325-650 mg, Oral, EVERY 6 HOURS PRN, Take either 325mg every 6 hours or 650mg every 8 hours for pain. Do not exceed 2000mg in 24 hours.     furosemide (LASIX) 40 mg, Oral, DAILY     LORazepam (ATIVAN) 2 MG tablet Please bring ativan to imaging appointment. You will be instructed when to take by imaging tech. YOU NEED A      Milk Thistle-Dand-Fennel-Licor (MILK THISTLE XTRA) CAPS capsule 1 capsule, Oral, DAILY     Multiple Vitamins-Minerals (MULTIVITAMIN & MINERAL PO) DAILY     spironolactone (ALDACTONE) 100 mg, Oral, DAILY       Care Coordination Start Date: 9/11/2020   Frequency of Care Coordination: monthly   Form Last Updated: 01/13/2021     Cathryn Boothe [facility]  Clinic Care Coordinator  Ph. [phone]  [email or web address]

## 2021-01-13 NOTE — PROGRESS NOTES
Clinic Care Coordination Contact    Clinic Care Coordination Contact  OUTREACH    Referral Information:  Referral Source: Care Team    Primary Diagnosis: Psychosocial    Chief Complaint   Patient presents with     Clinic Care Coordination - Initial     SSDI        Universal Utilization: Reviewed on this date.   Difficulty keeping appointments: No  Compliance Concerns: No  No-Show Concerns: No  No PCP office visit in Past Year: No  Utilization    Last refreshed: 1/13/2021  1:39 PM: Hospital Admissions 2           Last refreshed: 1/13/2021  1:39 PM: ED Visits 1           Last refreshed: 1/13/2021  1:39 PM: No Show Count (past year) 0              Current as of: 1/13/2021  1:39 PM            Clinical Concerns:  Current Medical Concerns:    Patient Active Problem List   Diagnosis     Non morbid obesity due to excess calories     Other isolated or specific phobias     Other congenital malformations of mouth     Contact dermatitis and other eczema, due to unspecified cause     Intestinal infection due to Clostridium difficile     Iron deficiency anemia, unspecified iron deficiency anemia type     Rosacea     Atypical ductal hyperplasia of left breast     Idiopathic thrombocytopenia (H)     Postmenopausal- s/p hysterectomy with BSO - not on HRT secondary to atypical ductal hyperplasia & breast pain -no paps needed     Claustrophobia     S/P total hysterectomy and bilateral salpingo-oophorectomy     Abnormal liver enzymes- ? related to ongoing etoh use/abuse     Essential hypertension with goal blood pressure less than 140/90     Gastroenteritis     Stool incontinence     CARDIOVASCULAR SCREENING; LDL GOAL LESS THAN 130     AA (alcohol abuse) - ? ongoing      Alcoholic fatty liver     Acquired hyperbilirubinemia- ? secondary to alcohol use     Diffuse nodular cirrhosis of liver (H)     Severe Perianal Crohn's Disease     Biliary colic     Cholecystitis     Alcoholic cirrhosis of liver with ascites (H) - seeing [facility]         Current Behavioral Concerns: Not discussed at this time.     Education Provided to patient: Role of SENTHIL CC and reason for outreach.    Pain : No  Health Maintenance Reviewed: Due/Overdue   Clinical Pathway: None    Medication Management:  Independent.      Functional Status:  Bed or wheelchair confined: No  Mobility Status: Independent    Living Situation:  Current living arrangement: I live in a private home  Type of residence: Private home - stairs    Lifestyle & Psychosocial Needs:  Lifestyle     Physical activity     Days per week: 0 days     Minutes per session: Not on file     Stress: Rather much     Social Needs     Financial resource strain: Not very hard     Food insecurity     Worry: Never true     Inability: Never true     Transportation needs     Medical: No     Non-medical: No     Diet: Regular  Tube Feeding: No  Inadequate activity/exercise : No  Transportation means: Regular car     Synagogue or spiritual beliefs that impact treatment: No  Mental health DX: No  Mental health management concern : Yes  Chemical Dependency Status: No Current Concerns  Informal Support system: Family, Children, Friends   Socioeconomic History     Marital status:      Spouse name: Albino     Number of children: 3     Years of education: 14     Highest education level: Not on file   Occupational History     Occupation: [employer]     Comment:    Relationships     Social connections     Talks on phone: More than three times a week     Gets together: More than three times a week     Attends Confucianism service: Not on file     Active member of club or organization: Not on file     Attends meetings of clubs or organizations: Not on file     Relationship status: Not on file     Intimate partner violence     Fear of current or ex partner: Not on file     Emotionally abused: Not on file     Physically abused: Not on file     Forced sexual activity: Not on file     Tobacco Use     Smoking status:  Never Smoker     Smokeless tobacco: Never Used   Substance and Sexual Activity     Alcohol use: Yes     Alcohol/week: 0.0 standard drinks     Comment: occasional     Drug use: No     Comment: no herbal meds either     Sexual activity: Yes     Partners: Male     Birth control/protection: Surgical     Comment: harper had vasectomy       Patient is interested in applying for SSDI. Patient is agreeable to SENTHIL HOWE sending the information for Disability Specialists.      Resources and Interventions:  Community Resources: None  Supplies used at home: None  Equipment Currently Used at Home: none    Advance Care Plan/Directive  Advanced Care Plans/Directives on file: No  Advanced Care Plan/Directive Status: In Process    Referrals Placed: Disability Specialists     Goals:   Goals        General    1. Psychosocial (pt-stated)     Notes - Note created  1/13/2021  2:24 PM by Cathryn Goss LGSW    Goal Statement: I want to apply for Social Security Disability within 6 months.   Date Goal set: 1.13.2021  Barriers: Navigating application/paperwork.  Strengths: Recognition of need, willingness to collaborate with Disability Specialists.   Date to Achieve By: 7.13.2021  Patient expressed understanding of goal: Pt reports understanding and denies any additional questions or concerns at this times. SENTHIL HOWE engaged in AIDET communication during encounter.    Action steps to achieve this goal:  1. I will review the letter sent by SENTHIL HOWE.   2. I will contact Disability Specialists.   3. I will work with Disability Specialists to apply for SSDI.              Patient/Caregiver understanding: Pt reports understanding and denies any additional questions or concerns at this times. SENTHIL HOWE engaged in AIDET communication during encounter.    Outreach Frequency: monthly      Plan: CHW will outreach to Patient monthly to address goal progression. Patient will initiate goal action steps and coordinate with Disability Specialists. SENTHIL HOWE will remain  available for CC needs and will schedule a clinical review in 6 weeks.     Cathryn Boothe, [facility]  Clinic Care Coordinator  Ph. [phone]  [email or web address]

## 2021-01-13 NOTE — LETTER
"[place] CARE COORDINATION  [address]  [place] [address]  January 13, 2021    Abiola Matute  [address] [address]      Dear Abiola,    I am a clinic care coordinator who works at the [facility]. I wanted to thank you for spending the time to talk with me.  Below is a description of clinic care coordination and how I can further assist you.      The clinic care coordination team is made up of a registered nurse,  and community health worker who understand the health care system. The goal of clinic care coordination is to help you manage your health and improve access to the health care system in the most efficient manner. The team can assist you in meeting your health care goals by providing education, coordinating services, strengthening the communication among your providers and supporting you with any resource needs.    As we discussed, I've included the information for Disability Specialists below:    \"We know you d rather be healthy and working but understand that may no longer be possible for you. That s why we will:  Discuss your situation with you at no charge.  Determine if you should pursue a disability claim.  Answer technical and procedural questions about Social Security programs.\"    Phone:  Toll Free: [phone]  Direct: [phone]  Fax: [phone]  [email or web address]  Address:  [address] [place] [address],  [place] [address]  [email or web address]    Please feel free to contact me at [phone], or [facility] Community Health Worker, at [phone] with any questions or concerns. We are focused on providing you with the highest-quality healthcare experience possible and that all starts with you.     Sincerely,         Cathryn Boothe, [facility]  Clinic Care Coordinator  Ph. [phone]  [email or web address]      Enclosed: I have enclosed a copy of the Complex Care Plan. This has helpful information and goals that we " have talked about. Please keep this in an easy to access place to use as needed.

## 2021-01-19 ENCOUNTER — DOCUMENTATION ONLY (OUTPATIENT)
Dept: GASTROENTEROLOGY | Facility: CLINIC | Age: 56
End: 2021-01-19

## 2021-01-19 NOTE — PROGRESS NOTES
Called Pt to obtain phone number for [facility] to request for lab results to be faxed again. Per Pt, gal who comes out to draw labs is Willis, phone number: [phone].    Called Willis to have results faxed again, as we have not received them. No answer. Left VM to call back.    Lefty Lainez LPN

## 2021-01-25 ENCOUNTER — OFFICE VISIT (OUTPATIENT)
Dept: SURGERY | Facility: CLINIC | Age: 56
End: 2021-01-25
Payer: COMMERCIAL

## 2021-01-25 VITALS
DIASTOLIC BLOOD PRESSURE: 93 MMHG | HEIGHT: 65 IN | WEIGHT: 178 LBS | OXYGEN SATURATION: 95 % | SYSTOLIC BLOOD PRESSURE: 139 MMHG | BODY MASS INDEX: 29.66 KG/M2 | HEART RATE: 82 BPM

## 2021-01-25 DIAGNOSIS — K50.914 CROHN'S DISEASE WITH ABSCESS, UNSPECIFIED GASTROINTESTINAL TRACT LOCATION (H): ICD-10-CM

## 2021-01-25 DIAGNOSIS — K60.30 ANAL FISTULA: Primary | ICD-10-CM

## 2021-01-25 PROCEDURE — 99213 OFFICE O/P EST LOW 20 MIN: CPT | Performed by: NURSE PRACTITIONER

## 2021-01-25 ASSESSMENT — MIFFLIN-ST. JEOR: SCORE: 1398.28

## 2021-01-25 ASSESSMENT — PAIN SCALES - GENERAL: PAINLEVEL: MODERATE PAIN (5)

## 2021-01-25 NOTE — NURSING NOTE
"Chief Complaint   Patient presents with     RECHECK     Check for abscess/discuss seton placement       Vitals:    01/25/21 1222   BP: (!) 139/93   BP Location: Left arm   Patient Position: Sitting   Cuff Size: Adult Regular   Pulse: 82   SpO2: 95%   Weight: 178 lb   Height: 5' 5\"       Body mass index is 29.62 kg/m .    Kassandra Schaefer MA    "

## 2021-01-25 NOTE — LETTER
"2021       RE: Abiola Matute  [address] [facility] [id number]     Dear Colleague,    Thank you for referring your patient, Abiola Matute, to the [facility] COLON AND RECTAL SURGERY CLINIC [place] at [facility]. Please see a copy of my visit note below.    Colon and Rectal Surgery Consult Clinic Note    Referring provider:  Farhan Schilling MD  [address] [place] [address]     RE: Abiola Matute  : 1965  MICHELLE: 20    Abiola Matute is a very pleasant 55 year old female with perianal Crohn's disease who presents today for follow up of anal fistula.    HPI:  [place] had a seton placed on 3/28/2019 with Dr. Diane Fleming which fell out last year. She was relatively asymptomatic so wanted to see how she did without the seton. She had been doing well but a few weeks ago she could feel two external fistula sites with swelling between the two. She has some dull pain constantly. Minimal drainage.  She is currently on infliximab and follows with Sheldon Serrato and Dr. Farhan Schilling of gastroenterology and is having only 1 or 2 bowel movements a day.  Her last colonoscopy was in 2019.    MR Pelvis 10/23/20:  IMPRESSION:   1.  Mild bowel wall thickening and mucosal hyperenhancement in the  visualized sigmoid colon and rectum, suspicious for an infectious or  inflammatory proctocolitis.  2.  There is a probable intersphincteric perianal fistula extending  inferiorly and posteriorly from the anus on the left at 3:00, and  likely contacting the skin of the gluteal cleft on the left.  3.  No evidence for associated abscess.  4.  Trace amount of nonspecific free fluid in the pelvis.    Physical Examination:  BP (!) 139/93 (BP Location: Left arm, Patient Position: Sitting, Cuff Size: Adult Regular)   Pulse 82   Ht 5' 5\"   Wt 178 lb   LMP 2006   SpO2 95%   BMI 29.62 kg/m    General: Alert, oriented, in no acute distress, " sitting comfortably    Perianal external examination: Exam was chaperoned by Eloy Solis EMT   Waxy perianal skin tags present circumferentially.  There are two areas of fissuring vs small fistula openings in the left lateral position without any drainage or induration.  Very tender on palpation.    Assessment/Plan: 55 year old female with perianal Crohn's disease and anal fistula with prior seton that fell out over a year ago.  We discussed replacing the seton given her worsening symptoms. Will also discuss with Dr. Schilling if he would like to re scope her and if he feels Crohn's is optimally medically managed.  Patient's questions were answered to her stated satisfaction and she is in agreement with this plan.    Medical history:  Past Medical History:   Diagnosis Date     Alcohol abuse      Alcoholic cirrhosis of liver with ascites      Atypical ductal hyperplasia of breast 9/10/10    ERT not recommended -left - and flat epithelial atypia-scheduled for breast biopsy 9/17/2010      Bifid uvula      Cholelithiasis      Contact perianal dermatitis and other eczema     recurrent - clobetasol      Crohn disease      Fear of flying      - gets Ativan prn.      Hypertension      IBS (irritable bowel syndrome)        Surgical history:  Past Surgical History:   Procedure Laterality Date     BIOPSY ANAL N/A 3/28/2019    anal biopsy and culure placement of seton - Dr Fleming     BIOPSY BREAST Left 09/17/2010    - scheduled with Dr. Varma      BIOPSY LIVER  2019     C APPENDECTOMY  at age 14     COLONOSCOPY  2006     COLONOSCOPY N/A 12/23/2014    Procedure: COMBINED COLONOSCOPY, SINGLE OR MULTIPLE BIOPSY/POLYPECTOMY BY BIOPSY;  Surgeon: Diane Fleming MD;  Location:  GI     COLONOSCOPY N/A 12/5/2019    Procedure: COLONOSCOPY, WITH POLYPECTOMY AND BIOPSY;  Surgeon: Farhan Schilling MD;  Location:  GI     ENDOSCOPIC RETROGRADE CHOLANGIOPANCREATOGRAM N/A 4/24/2020    Procedure: ENDOSCOPIC RETROGRADE  CHOLANGIOPANCREATOGRAPHY WITH, sledge removal,sphincterotomy, stent in gallbladder and pancreatic duct stent, and balloon dilation;  Surgeon: Guru Isac Kraft MD;  Location: [facility] OR     ESOPHAGOSCOPY, GASTROSCOPY, DUODENOSCOPY (EGD), COMBINED N/A 12/5/2019    Procedure: ESOPHAGOGASTRODUODENOSCOPY (EGD);  Surgeon: Farhan Schilling MD;  Location: [facility] GI     EXAM UNDER ANESTHESIA ANUS N/A 3/28/2019    Procedure: EXAM UNDER ANESTHESIA ANUS;  Surgeon: Diane Fleming MD;  Location:  OR     HYSTERECTOMY, VAGINAL  2006    with Dr. Licha Zhou - with BSO for fibroids      IR GALLBLADDER DRAIN PLACEMENT  4/22/2020     OPEN REDUCTION INTERNAL FIXATION ANKLE Left at age 27    plates and screws removed at age 36     Pelviscopy with removal of bilateral hydrosalpinges.  04/15/2010       Problem list:    Patient Active Problem List    Diagnosis Date Noted     Alcoholic fatty liver 05/14/2014     Priority: High     Acquired hyperbilirubinemia- ? secondary to alcohol use 05/14/2014     Priority: High     Atypical ductal hyperplasia of left breast 10/04/2010     Priority: High     Cholecystitis 10/22/2019     Priority: Medium     Alcoholic cirrhosis of liver with ascites (H) - seeing [facility]  10/22/2019     Priority: Medium     Biliary colic 10/21/2019     Priority: Medium     Severe Perianal Crohn's Disease 07/23/2019     Priority: Medium     Stool incontinence 09/30/2014     Priority: Medium     CARDIOVASCULAR SCREENING; LDL GOAL LESS THAN 130 09/30/2014     Priority: Medium     Gastroenteritis 05/20/2014     Priority: Medium     Diffuse nodular cirrhosis of liver (H) 05/20/2014     Priority: Medium     CT abdomen from 2/2019   IMPRESSION:  1. Cirrhotic liver with evidence of portal venous hypertension,  including gastroesophageal varices and probable hemorrhoids. The  spleen size is upper normal. No ascites.  2. Cholelithiasis.       AA (alcohol abuse) - ? ongoing  05/14/2014     Priority: Medium     Per  hospital d/c summary 5/2014: Alcoholic cirrhosis of liver with small ascites and ultrasound consistent with liver nodule.  She had elevated LFTs and was higher in 2013.  Her hepatitis C and B antigens are negative and her hepatitis A antigen and antibody also was negative.  The patient had a long history of alcohol use.  She drinks about 4 drinks every other day or every day and the patient was advised alcohol cessation and was explained that her liver was damaged although her LFTs are decreased from levels of 09/2013 but I think she already developed cirrhosis of the liver and without stopping alcohol she will progress to full blown cirrhosis of the liver with portal hypertension and understand the consequences and patient understood she needs to go to AA and alcohol treatment.  The patient was placed with alcohol withdrawal protocol with Ativan started with vitamins during her admission here.  The patient understood the consequence of continued alcohol use.         Essential hypertension with goal blood pressure less than 140/90 06/18/2013     Priority: Medium     Abnormal liver enzymes- ? related to ongoing etoh use/abuse 10/12/2012     Priority: Medium     S/P total hysterectomy and bilateral salpingo-oophorectomy 09/26/2012     Priority: Medium     Claustrophobia 09/19/2011     Priority: Medium     Postmenopausal- s/p hysterectomy with BSO - not on HRT secondary to atypical ductal hyperplasia & breast pain -no paps needed 08/17/2011     Priority: Medium     Idiopathic thrombocytopenia (H) 03/01/2011     Priority: Medium     Rosacea 08/24/2010     Priority: Medium     Iron deficiency anemia, unspecified iron deficiency anemia type 05/08/2006     Priority: Medium     Problem list name updated by automated process. Provider to review       Intestinal infection due to Clostridium difficile 03/09/2006     Priority: Medium     Other isolated or specific phobias 12/06/2005     Priority: Medium     fear of flying -  gets Ativan prn.        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 Provider to review and confirm.       Contact dermatitis and other eczema, due to unspecified cause 12/06/2005     Priority: Medium     perianal  - recurrent - clobetasol        Non morbid obesity due to excess calories 12/23/2003     Priority: Medium     Problem list name updated by automated process. Provider to review         Medications:  Current Outpatient Medications   Medication Sig Dispense Refill     acetaminophen (TYLENOL) 325 MG tablet Take 1-2 tablets (325-650 mg) by mouth every 6 hours as needed for mild pain Take either 325mg every 6 hours or 650mg every 8 hours for pain. Do not exceed 2000mg in 24 hours. 100 tablet 0     furosemide (LASIX) 40 MG tablet Take 1 tablet (40 mg) by mouth daily 90 tablet 3     LORazepam (ATIVAN) 2 MG tablet Please bring ativan to imaging appointment. You will be instructed when to take by imaging tech. YOU NEED A  1 tablet 0     Milk Thistle-Dand-Fennel-Licor (MILK THISTLE XTRA) CAPS capsule Take 1 capsule by mouth daily       Multiple Vitamins-Minerals (MULTIVITAMIN & MINERAL PO) Take  by mouth daily.       spironolactone (ALDACTONE) 100 MG tablet Take 1 tablet (100 mg) by mouth daily 90 tablet 3       Allergies:  Allergies   Allergen Reactions     Fish Oil      Redness and itching around eye area only - went away when fish oil capsules stopped      Metronidazole      pain/itching     Naphthalenemethylamines      Lamisil = mild urticarial reaction     Ppd [Tuberculin Purified Protein Derivative]      Sulfa Drugs      hives       Family history:  Family History   Problem Relation Age of Onset     Breast Cancer Mother      Gastrointestinal Disease Mother      Heart Disease Father 57     Heart Disease Paternal Grandfather      Hypertension Maternal Grandmother      Diabetes Paternal Grandmother      Diabetes Maternal Grandfather   "    Cancer Sister        Social history:  Social History     Tobacco Use     Smoking status: Never Smoker     Smokeless tobacco: Never Used   Substance Use Topics     Alcohol use: Yes     Alcohol/week: 0.0 standard drinks     Comment: occasional    Marital status: .    Nursing Notes:   Kassandra Schaefer  1/25/2021 12:26 PM  Signed  Chief Complaint   Patient presents with     RECHECK     Check for abscess/discuss seton placement       Vitals:    01/25/21 1222   BP: (!) 139/93   BP Location: Left arm   Patient Position: Sitting   Cuff Size: Adult Regular   Pulse: 82   SpO2: 95%   Weight: 178 lb   Height: 5' 5\"       Body mass index is 29.62 kg/m .    Kassandra Schaefer MA       Total face to face time was 20 minutes, >50% counseling.    DARYN Daly, NP-C  Colon and Rectal Surgery   [facility]    This note was created using speech recognition software and may contain unintended word substitutions.        Again, thank you for allowing me to participate in the care of your patient.      Sincerely,    DARYN Daly CNP      "

## 2021-01-25 NOTE — PROGRESS NOTES
"Colon and Rectal Surgery Consult Clinic Note    Referring provider:  Farhan Schilling MD  [address] [place] [id number]     RE: Abiola Matute  : 1965  MICHELLE: 20    Abiola Matute is a very pleasant 55 year old female with perianal Crohn's disease who presents today for follow up of anal fistula.    HPI:  [place] had a seton placed on 3/28/2019 with Dr. Diane Fleming which fell out last year. She was relatively asymptomatic so wanted to see how she did without the seton. She had been doing well but a few weeks ago she could feel two external fistula sites with swelling between the two. She has some dull pain constantly. Minimal drainage.  She is currently on infliximab and follows with Sheldon Serrato and Dr. Farhan Schilling of gastroenterology and is having only 1 or 2 bowel movements a day.  Her last colonoscopy was in 2019.    MR Pelvis 10/23/20:  IMPRESSION:   1.  Mild bowel wall thickening and mucosal hyperenhancement in the  visualized sigmoid colon and rectum, suspicious for an infectious or  inflammatory proctocolitis.  2.  There is a probable intersphincteric perianal fistula extending  inferiorly and posteriorly from the anus on the left at 3:00, and  likely contacting the skin of the gluteal cleft on the left.  3.  No evidence for associated abscess.  4.  Trace amount of nonspecific free fluid in the pelvis.    Physical Examination:  BP (!) 139/93 (BP Location: Left arm, Patient Position: Sitting, Cuff Size: Adult Regular)   Pulse 82   Ht 5' 5\"   Wt 178 lb   LMP 2006   SpO2 95%   BMI 29.62 kg/m    General: Alert, oriented, in no acute distress, sitting comfortably    Perianal external examination: Exam was chaperoned by Eloy Solis, EMT   Waxy perianal skin tags present circumferentially.  There are two areas of fissuring vs small fistula openings in the left lateral position without any drainage or induration.  Very tender on palpation.    Assessment/Plan: 55 year old " female with perianal Crohn's disease and anal fistula with prior seton that fell out over a year ago.  We discussed replacing the seton given her worsening symptoms. Will also discuss with Dr. Schilling if he would like to re scope her and if he feels Crohn's is optimally medically managed.  Patient's questions were answered to her stated satisfaction and she is in agreement with this plan.    Medical history:  Past Medical History:   Diagnosis Date     Alcohol abuse      Alcoholic cirrhosis of liver with ascites      Atypical ductal hyperplasia of breast 9/10/10    ERT not recommended -left - and flat epithelial atypia-scheduled for breast biopsy 9/17/2010      Bifid uvula      Cholelithiasis      Contact perianal dermatitis and other eczema     recurrent - clobetasol      Crohn disease      Fear of flying      - gets Ativan prn.      Hypertension      IBS (irritable bowel syndrome)        Surgical history:  Past Surgical History:   Procedure Laterality Date     BIOPSY ANAL N/A 3/28/2019    anal biopsy and culure placement of seton - Dr Fleming     BIOPSY BREAST Left 09/17/2010    - scheduled with Dr. Varma      BIOPSY LIVER  2019     C APPENDECTOMY  at age 14     COLONOSCOPY  2006     COLONOSCOPY N/A 12/23/2014    Procedure: COMBINED COLONOSCOPY, SINGLE OR MULTIPLE BIOPSY/POLYPECTOMY BY BIOPSY;  Surgeon: Diane Fleming MD;  Location:  GI     COLONOSCOPY N/A 12/5/2019    Procedure: COLONOSCOPY, WITH POLYPECTOMY AND BIOPSY;  Surgeon: Farhan Schilling MD;  Location:  GI     ENDOSCOPIC RETROGRADE CHOLANGIOPANCREATOGRAM N/A 4/24/2020    Procedure: ENDOSCOPIC RETROGRADE CHOLANGIOPANCREATOGRAPHY WITH, sledge removal,sphincterotomy, stent in gallbladder and pancreatic duct stent, and balloon dilation;  Surgeon: Guru Isac Kraft MD;  Location:  OR     ESOPHAGOSCOPY, GASTROSCOPY, DUODENOSCOPY (EGD), COMBINED N/A 12/5/2019    Procedure: ESOPHAGOGASTRODUODENOSCOPY (EGD);  Surgeon: Shakir  Farhan Butler MD;  [facility]     EXAM UNDER ANESTHESIA ANUS N/A 3/28/2019    Procedure: EXAM UNDER ANESTHESIA ANUS;  Surgeon: Diane Fleming MD;  Location: [facility] OR     HYSTERECTOMY, VAGINAL  2006    with Dr. Licha Zhou - with BSO for fibroids      IR GALLBLADDER DRAIN PLACEMENT  4/22/2020     OPEN REDUCTION INTERNAL FIXATION ANKLE Left at age 27    plates and screws removed at age 36     Pelviscopy with removal of bilateral hydrosalpinges.  04/15/2010       Problem list:    Patient Active Problem List    Diagnosis Date Noted     Alcoholic fatty liver 05/14/2014     Priority: High     Acquired hyperbilirubinemia- ? secondary to alcohol use 05/14/2014     Priority: High     Atypical ductal hyperplasia of left breast 10/04/2010     Priority: High     Cholecystitis 10/22/2019     Priority: Medium     Alcoholic cirrhosis of liver with ascites (H) - seeing [facility]  10/22/2019     Priority: Medium     Biliary colic 10/21/2019     Priority: Medium     Severe Perianal Crohn's Disease 07/23/2019     Priority: Medium     Stool incontinence 09/30/2014     Priority: Medium     CARDIOVASCULAR SCREENING; LDL GOAL LESS THAN 130 09/30/2014     Priority: Medium     Gastroenteritis 05/20/2014     Priority: Medium     Diffuse nodular cirrhosis of liver (H) 05/20/2014     Priority: Medium     CT abdomen from 2/2019   IMPRESSION:  1. Cirrhotic liver with evidence of portal venous hypertension,  including gastroesophageal varices and probable hemorrhoids. The  spleen size is upper normal. No ascites.  2. Cholelithiasis.       AA (alcohol abuse) - ? ongoing  05/14/2014     Priority: Medium     Per hospital d/c summary 5/2014: Alcoholic cirrhosis of liver with small ascites and ultrasound consistent with liver nodule.  She had elevated LFTs and was higher in 2013.  Her hepatitis C and B antigens are negative and her hepatitis A antigen and antibody also was negative.  The patient had a long history of alcohol use.  She drinks about  4 drinks every other day or every day and the patient was advised alcohol cessation and was explained that her liver was damaged although her LFTs are decreased from levels of 09/2013 but I think she already developed cirrhosis of the liver and without stopping alcohol she will progress to full blown cirrhosis of the liver with portal hypertension and understand the consequences and patient understood she needs to go to AA and alcohol treatment.  The patient was placed with alcohol withdrawal protocol with Ativan started with vitamins during her admission here.  The patient understood the consequence of continued alcohol use.         Essential hypertension with goal blood pressure less than 140/90 06/18/2013     Priority: Medium     Abnormal liver enzymes- ? related to ongoing etoh use/abuse 10/12/2012     Priority: Medium     S/P total hysterectomy and bilateral salpingo-oophorectomy 09/26/2012     Priority: Medium     Claustrophobia 09/19/2011     Priority: Medium     Postmenopausal- s/p hysterectomy with BSO - not on HRT secondary to atypical ductal hyperplasia & breast pain -no paps needed 08/17/2011     Priority: Medium     Idiopathic thrombocytopenia (H) 03/01/2011     Priority: Medium     Rosacea 08/24/2010     Priority: Medium     Iron deficiency anemia, unspecified iron deficiency anemia type 05/08/2006     Priority: Medium     Problem list name updated by automated process. Provider to review       Intestinal infection due to Clostridium difficile 03/09/2006     Priority: Medium     Other isolated or specific phobias 12/06/2005     Priority: Medium     fear of flying - gets Ativan prn.        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 Provider to review and confirm.       Contact dermatitis and other eczema, due to unspecified cause 12/06/2005     Priority: Medium     perianal  - recurrent - clobetasol        Non morbid obesity due to  excess calories 12/23/2003     Priority: Medium     Problem list name updated by automated process. Provider to review         Medications:  Current Outpatient Medications   Medication Sig Dispense Refill     acetaminophen (TYLENOL) 325 MG tablet Take 1-2 tablets (325-650 mg) by mouth every 6 hours as needed for mild pain Take either 325mg every 6 hours or 650mg every 8 hours for pain. Do not exceed 2000mg in 24 hours. 100 tablet 0     furosemide (LASIX) 40 MG tablet Take 1 tablet (40 mg) by mouth daily 90 tablet 3     LORazepam (ATIVAN) 2 MG tablet Please bring ativan to imaging appointment. You will be instructed when to take by imaging tech. YOU NEED A  1 tablet 0     Milk Thistle-Dand-Fennel-Licor (MILK THISTLE XTRA) CAPS capsule Take 1 capsule by mouth daily       Multiple Vitamins-Minerals (MULTIVITAMIN & MINERAL PO) Take  by mouth daily.       spironolactone (ALDACTONE) 100 MG tablet Take 1 tablet (100 mg) by mouth daily 90 tablet 3       Allergies:  Allergies   Allergen Reactions     Fish Oil      Redness and itching around eye area only - went away when fish oil capsules stopped      Metronidazole      pain/itching     Naphthalenemethylamines      Lamisil = mild urticarial reaction     Ppd [Tuberculin Purified Protein Derivative]      Sulfa Drugs      hives       Family history:  Family History   Problem Relation Age of Onset     Breast Cancer Mother      Gastrointestinal Disease Mother      Heart Disease Father 57     Heart Disease Paternal Grandfather      Hypertension Maternal Grandmother      Diabetes Paternal Grandmother      Diabetes Maternal Grandfather      Cancer Sister        Social history:  Social History     Tobacco Use     Smoking status: Never Smoker     Smokeless tobacco: Never Used   Substance Use Topics     Alcohol use: Yes     Alcohol/week: 0.0 standard drinks     Comment: occasional    Marital status: .    Nursing Notes:   Kassandra Schaefer  1/25/2021 12:26 PM  Signed  Chief  "Complaint   Patient presents with     RECHECK     Check for abscess/discuss seton placement       Vitals:    01/25/21 1222   BP: (!) 139/93   BP Location: Left arm   Patient Position: Sitting   Cuff Size: Adult Regular   Pulse: 82   SpO2: 95%   Weight: 178 lb   Height: 5' 5\"       Body mass index is 29.62 kg/m .    Kassandra Schaefer MA       Total face to face time was 20 minutes, >50% counseling.    DARYN Daly, NP-C  Colon and Rectal Surgery   [facility]    This note was created using speech recognition software and may contain unintended word substitutions.    "

## 2021-01-26 ENCOUNTER — PREP FOR PROCEDURE (OUTPATIENT)
Dept: SURGERY | Facility: CLINIC | Age: 56
End: 2021-01-26

## 2021-01-26 DIAGNOSIS — K61.2 ABSCESS OF ANAL OR RECTAL REGION: Primary | ICD-10-CM

## 2021-01-27 ENCOUNTER — TELEPHONE (OUTPATIENT)
Dept: SURGERY | Facility: CLINIC | Age: 56
End: 2021-01-27

## 2021-01-27 NOTE — TELEPHONE ENCOUNTER
Case Request received to schedule a Tier 2 outpatient case with Dr. Nam at the [facility].     Phone call to patient at provided number, left vm msg for return call regarding scheduling.

## 2021-01-29 ENCOUNTER — TELEPHONE (OUTPATIENT)
Dept: GASTROENTEROLOGY | Facility: CLINIC | Age: 56
End: 2021-01-29

## 2021-01-29 ENCOUNTER — PATIENT OUTREACH (OUTPATIENT)
Dept: GASTROENTEROLOGY | Facility: CLINIC | Age: 56
End: 2021-01-29

## 2021-01-29 DIAGNOSIS — K50.113 CROHN'S DISEASE OF LARGE INTESTINE WITH FISTULA (H): Primary | ICD-10-CM

## 2021-01-29 DIAGNOSIS — K62.89 PROCTITIS: ICD-10-CM

## 2021-01-29 NOTE — PROGRESS NOTES
? an active fistula and still has active proctitis     Flex sig order placed  Left a message for patient about the request for a flex sig  Also a my chart message.

## 2021-01-29 NOTE — TELEPHONE ENCOUNTER
Left message for patient to call back to schedule flex sig with Dr. Waldrop.     Procedure: Lower Endoscopy schedule with dr waldrop asap    Lower Endoscopy Type: Flex Sig    Flex Sig Sedation: Conscious/Moderate    Flex Sig Reason for Procedure: ? active fistula and still has active procitis    Preferred Location: [facility]    Scheduling Instructions: If you have not heard from the scheduling office within 2 business days, please call [phone].      Dx:   Crohn's disease of large intestine with fistula (H) [K50.113]      Proctitis [K62.89]

## 2021-02-02 ENCOUNTER — TELEPHONE (OUTPATIENT)
Dept: GASTROENTEROLOGY | Facility: CLINIC | Age: 56
End: 2021-02-02

## 2021-02-02 NOTE — TELEPHONE ENCOUNTER
[facility] Call Center    Phone Message    May a detailed message be left on voicemail: yes     Reason for Call: Other: Per pt would like to have an order sent to her [facility] in [place]  for her Pre-op. Please call pt back with any questions.     Action Taken: Message routed to:  [facility] ([facility] Rec    Travel Screening: Not Applicable

## 2021-02-02 NOTE — TELEPHONE ENCOUNTER
Patient is scheduled for FLEX SIG with Dr. MTZ    Spoke with: STEVEN    Date of Procedure: 03/01/2021    Location: [facility]    Sedation Type MAC    Pre-op for Unit J MAC NOT NEEDED    (if yes advise patient they will need a pre-op prior to procedure)      Is patient on blood thinners? -NO (If yes- inform patient to follow up with PCP or provider for follow up instructions)     Informed patient they will need an adult  YES    Informed Patient of COVID Test Requirement YES    Preferred Pharmacy for Pre Prescription NA    Confirmed Nurse will call to complete assessment YES    Additional comments: NA

## 2021-02-04 NOTE — TELEPHONE ENCOUNTER
Contacted patient to discuss request  Left a message   Patient has a flex sig scheduled at [facility] will not need a pre op

## 2021-02-08 DIAGNOSIS — K60.30 ANAL FISTULA: Primary | ICD-10-CM

## 2021-02-08 PROBLEM — K61.2 ABSCESS OF ANAL OR RECTAL REGION: Status: ACTIVE | Noted: 2021-02-08

## 2021-02-08 NOTE — TELEPHONE ENCOUNTER
Returned call from patient asking who to contact to schedule seton placement  Pt states she did not receive a call   Noted on January 27 [place] let a message  Pt given Ms. Vu and Ms. Fraga contact numbers.

## 2021-02-08 NOTE — TELEPHONE ENCOUNTER
Spoke with patient, completed the following scheduling. I did offer to consolidate her COVID tests onto 1 date - 2/25/2021 - if she'd come to the [facility] in the morning for this, in order to allow sufficient time for processing ahead of both procedures on 2/26 and 3/1/2021, but patient preferred to have 2 COVID tests closer to home rather than driving into [facility] again.    Patient is scheduled for surgery with Dr. Nam    Spoke with Virginia    Date of Surgery: 2/26/2021    Location: [facility]    Informed patient they will need an adult  YES    COVID-19 Test scheduled on 2/23/2021 at 10:30 AM at [facility]    Pre-op H&P with Linda Macdonald APRN CNP at [facility] on 2/23/2021 at 11:00 AM    Post-op with Lisa Marcial NP, on 3/19/2021 at 11:30 AM    Surgery packet: MyChart and Mailed on 2/8/2021

## 2021-02-10 ENCOUNTER — PATIENT OUTREACH (OUTPATIENT)
Dept: NURSING | Facility: CLINIC | Age: 56
End: 2021-02-10
Payer: COMMERCIAL

## 2021-02-10 NOTE — PROGRESS NOTES
On 02/08/21 at 11:44AM, Virginia left writer a voicemail stating that he was looking for other options to help with disability.    Clinic Care Coordination Contact  Community Health Worker Follow Up  Spoke to Virginia    Goals:   Goals Addressed as of 2/10/2021 at 9:08 AM                 Today    1/13/21      1. Psychosocial (pt-stated)   20%  On track    Added 1/13/21 by Cathryn Goss, [facility]     Goal Statement: I want to apply for Social Security Disability within 6 months.   Date Goal set: 1.13.2021  Barriers: Navigating application/paperwork.  Strengths: Recognition of need, willingness to collaborate with Disability Specialists.   Date to Achieve By: 7.13.2021  Patient expressed understanding of goal: Pt reports understanding and denies any additional questions or concerns at this times. SENTHIL HOWE engaged in [employer]IDET communication during encounter.    Action steps to achieve this goal:  1. I will review the letter sent by SENTHIL HOWE.   2. I will contact Disability Specialists.   3. I will work with Disability Specialists to apply for SSDI.      02/10, CHW:    Virginia states that she looked at some reviews for  and was not impressed with .    Virginia is wondering if there might be other options to help with helping her apply for disability.        Intervention and Education during outreach:   CHW scheduled follow up with SHYAM Valencia, to look over additional options to assist with Disability.    CHW Plan: CHW will continue monthly outreaches to monitor progression of goals.    REY Hensley  Clinic Care Coordination  [facility] : [place], [place], and [place]  Phone: [phone]    ______________________  Next Outreach:  03/10/21  Planned Outreach Frequency: Monthly  Preferred Phone Number: [phone]    Enrollment Date:  01/13/21  Last Care Plan Assessment:  01/13/21

## 2021-02-15 ENCOUNTER — PATIENT OUTREACH (OUTPATIENT)
Dept: NURSING | Facility: CLINIC | Age: 56
End: 2021-02-15
Payer: COMMERCIAL

## 2021-02-15 DIAGNOSIS — Z11.59 ENCOUNTER FOR SCREENING FOR OTHER VIRAL DISEASES: ICD-10-CM

## 2021-02-15 NOTE — PROGRESS NOTES
Clinic Care Coordination Contact    Follow Up Progress Note      Assessment: Call placed to Patient. She reports that she prefers to work with an agency that has better reviews and works with a  for their SSDI cases.     Goals addressed this encounter:   Goals Addressed                 This Visit's Progress       Patient Stated      1. Psychosocial (pt-stated)   20%     Goal Statement: I want to apply for Social Security Disability within 6 months.   Date Goal set: 1.13.2021  Barriers: Navigating application/paperwork.  Strengths: Recognition of need, willingness to collaborate with Disability Specialists.   Date to Achieve By: 7.13.2021  Patient expressed understanding of goal: Pt reports understanding and denies any additional questions or concerns at this times. SENTHIL HOWE engaged in AIDET communication during encounter.    Action steps to achieve this goal:  1. I will review the letter sent by SENTHIL HOWE.   2. I will contact a Disability advocacy agency.   3. I will work with a disability advocacy agency to apply for SSDI.               Intervention/Education provided during outreach: We discussed that SENTHIL HOWE will review the resources and will send them to her via email. Pt is in agreement with this plan.     SENTHIL HOWE was unable to find an agency/program that fits Pt's criteria. SENTHIL HOWE sent an email to the [place] [employer], inquiring about programs. SENTHIL HOWE did find a few legal teams that assist with SSDI.      Outreach Frequency: monthly    Plan:   Email sent to Pt with information for legal teams. SENTHIL HOWE will await a response from the [place] [employer] and will forward to Patient within 2-3 business days.     Cathryn Boothe, [facility]  Clinic Care Coordinator  Ph. [phone]  [email or web address]

## 2021-02-15 NOTE — LETTER
"[facility] CARE COORDINATION  [address]  [place] [address]  February 15, 2021    Abiola Matute  [address] [address]      Dear Abiola,    Thank you for taking the time to speak with me this morning! After our conversation, I reviewed the different programs available that assist with SSDI applications in [facility]. Below are two agencies I found that offer  representation:    -[employer]: \"At [employer], we believe everyone deserves access to quality legal help, regardless of economic status.   Sliding fee rates designed based on income for family law representation  Limited scope options to help you handle as much as possible on your own  [employer] also represents individuals seeking social security disability, with many clients qualifying for services free-of-charge.\"  [employer] [facility]  [address], [address]  [place] [address]  [email or web address]    -[employer]: \"Experienced disability attorneys at [employer] will focus on winning your case, so you can focus on your health. Take our free, online Social Security disability evaluation, and be one step closer to Social Security Disability (SSDI) benefits. The evaluation is free, and provides great information.\"  [address], [place] [address]  TOLL FREE [phone]  DIRECT [phone]  [email or web address]    The following options were not found on the [facility]'s Department of Health Website, but are attorneys that focus on pursuing SSDI:    -[employer]: [email or web address]  [employer]   [address] [place] [address]  [phone]    -[employer]: [email or web address]  [address] [place] [address] [phone]     I also sent an email to the [place] [employer], inquiring if they are aware of any additional agency " options. If they have additional suggestions, I will be sure to forward them to you.       Please feel free to contact me at [phone] with any questions or concerns. We are focused on providing you with the highest-quality healthcare experience possible and that all starts with you.     Sincerely,       Cathryn Boothe, [facility]  Clinic Care Coordinator  Ph. [phone]  [email or web address]      Enclosed: I have enclosed helpful educational material. Please review and call me with any questions.

## 2021-02-23 ENCOUNTER — OFFICE VISIT (OUTPATIENT)
Dept: FAMILY MEDICINE | Facility: CLINIC | Age: 56
End: 2021-02-23
Payer: COMMERCIAL

## 2021-02-23 ENCOUNTER — TELEPHONE (OUTPATIENT)
Dept: GASTROENTEROLOGY | Facility: OUTPATIENT CENTER | Age: 56
End: 2021-02-23

## 2021-02-23 VITALS
WEIGHT: 179 LBS | BODY MASS INDEX: 29.82 KG/M2 | HEART RATE: 101 BPM | TEMPERATURE: 97.4 F | OXYGEN SATURATION: 98 % | SYSTOLIC BLOOD PRESSURE: 118 MMHG | HEIGHT: 65 IN | DIASTOLIC BLOOD PRESSURE: 86 MMHG

## 2021-02-23 DIAGNOSIS — K60.30 ANAL FISTULA: ICD-10-CM

## 2021-02-23 DIAGNOSIS — F43.23 ADJUSTMENT DISORDER WITH MIXED ANXIETY AND DEPRESSED MOOD: ICD-10-CM

## 2021-02-23 DIAGNOSIS — Z01.818 PREOPERATIVE EXAMINATION: Primary | ICD-10-CM

## 2021-02-23 DIAGNOSIS — K50.119 CROHN'S DISEASE OF PERIANAL REGION WITH COMPLICATION (H): ICD-10-CM

## 2021-02-23 LAB
ERYTHROCYTE [DISTWIDTH] IN BLOOD BY AUTOMATED COUNT: 13.9 % (ref 10–15)
HCT VFR BLD AUTO: 44.6 % (ref 35–47)
HGB BLD-MCNC: 15.2 G/DL (ref 11.7–15.7)
MCH RBC QN AUTO: 35 PG (ref 26.5–33)
MCHC RBC AUTO-ENTMCNC: 34.1 G/DL (ref 31.5–36.5)
MCV RBC AUTO: 103 FL (ref 78–100)
PLATELET # BLD AUTO: 98 10E9/L (ref 150–450)
RBC # BLD AUTO: 4.34 10E12/L (ref 3.8–5.2)
WBC # BLD AUTO: 3.3 10E9/L (ref 4–11)

## 2021-02-23 PROCEDURE — 85027 COMPLETE CBC AUTOMATED: CPT | Performed by: NURSE PRACTITIONER

## 2021-02-23 PROCEDURE — 36415 COLL VENOUS BLD VENIPUNCTURE: CPT | Performed by: NURSE PRACTITIONER

## 2021-02-23 PROCEDURE — 99214 OFFICE O/P EST MOD 30 MIN: CPT | Performed by: NURSE PRACTITIONER

## 2021-02-23 PROCEDURE — 80048 BASIC METABOLIC PNL TOTAL CA: CPT | Performed by: NURSE PRACTITIONER

## 2021-02-23 PROCEDURE — U0005 INFEC AGEN DETEC AMPLI PROBE: HCPCS | Performed by: COLON & RECTAL SURGERY

## 2021-02-23 PROCEDURE — U0003 INFECTIOUS AGENT DETECTION BY NUCLEIC ACID (DNA OR RNA); SEVERE ACUTE RESPIRATORY SYNDROME CORONAVIRUS 2 (SARS-COV-2) (CORONAVIRUS DISEASE [COVID-19]), AMPLIFIED PROBE TECHNIQUE, MAKING USE OF HIGH THROUGHPUT TECHNOLOGIES AS DESCRIBED BY CMS-2020-01-R: HCPCS | Performed by: COLON & RECTAL SURGERY

## 2021-02-23 RX ORDER — INFLIXIMAB 100 MG/10ML
10 INJECTION, POWDER, LYOPHILIZED, FOR SOLUTION INTRAVENOUS
Status: ON HOLD | COMMUNITY
Start: 2021-02-23 | End: 2024-06-28

## 2021-02-23 ASSESSMENT — PATIENT HEALTH QUESTIONNAIRE - PHQ9
5. POOR APPETITE OR OVEREATING: SEVERAL DAYS
SUM OF ALL RESPONSES TO PHQ QUESTIONS 1-9: 7

## 2021-02-23 ASSESSMENT — ANXIETY QUESTIONNAIRES
1. FEELING NERVOUS, ANXIOUS, OR ON EDGE: SEVERAL DAYS
IF YOU CHECKED OFF ANY PROBLEMS ON THIS QUESTIONNAIRE, HOW DIFFICULT HAVE THESE PROBLEMS MADE IT FOR YOU TO DO YOUR WORK, TAKE CARE OF THINGS AT HOME, OR GET ALONG WITH OTHER PEOPLE: SOMEWHAT DIFFICULT
3. WORRYING TOO MUCH ABOUT DIFFERENT THINGS: SEVERAL DAYS
2. NOT BEING ABLE TO STOP OR CONTROL WORRYING: SEVERAL DAYS
6. BECOMING EASILY ANNOYED OR IRRITABLE: SEVERAL DAYS
5. BEING SO RESTLESS THAT IT IS HARD TO SIT STILL: NOT AT ALL
GAD7 TOTAL SCORE: 6
7. FEELING AFRAID AS IF SOMETHING AWFUL MIGHT HAPPEN: SEVERAL DAYS

## 2021-02-23 ASSESSMENT — MIFFLIN-ST. JEOR: SCORE: 1402.82

## 2021-02-23 NOTE — RESULT ENCOUNTER NOTE
Dear Virginia,     Your CBC showed low, but stable platelets and low white blood cell count (which is stable from your levels 3 months ago).  Your hemoglobin was normal and reassuring.      Please send a [employer] message or call [phone]  if you have any questions.      Linda Macdonald, DARYN, CNP  [facility]    If you have further questions about the interpretation of your labs, [email or web address] is a good website to check out for further information.

## 2021-02-23 NOTE — H&P (VIEW-ONLY)
[address] [address]  Phone: [phone]  Primary Provider: Linda Macdonald        PREOPERATIVE EVALUATION:  Today's date: 2/23/2021    Abiola Matute is a 55 year old female who presents for a preoperative evaluation.    Surgical Information:  Surgery/Procedure: EXAM UNDER ANESTHESIA, ANUS, PLACEMENT, SETON STITCH  Surgery Location: [facility]   Surgeon: Dr. Nam  Surgery Date: 2/26/21  Time of Surgery: 11:30 AM  Where patient plans to recover: At home with family  Fax number for surgical facility: Note does not need to be faxed, will be available electronically in Epic.    Type of Anesthesia Anticipated: Monitor Care Anesthesia     Assessment & Plan     The proposed surgical procedure is considered INTERMEDIATE risk.    Abiola was seen today for pre-op exam.    Diagnoses and all orders for this visit:    Preoperative examination  -     CBC with platelets  -     Basic metabolic panel  (Ca, Cl, CO2, Creat, Gluc, K, Na, BUN)    Anal fistula  Severe Perianal Crohn's Disease  Planning seton placement and flexible sigmoidoscopy.        Adjustment disorder with mixed anxiety and depressed mood  PHQ 2/23/2021   PHQ-9 Total Score 7   Q9: Thoughts of better off dead/self-harm past 2 weeks Not at all     TAINA-7 SCORE 9/27/2013 9/29/2014 2/23/2021   Total Score 0 - -   Total Score - 2 -   Total Score - - 6     Mood/anxiety concerns which is being affected by chronic pain.  Initiate therapy at this time.   Consider selective serotonin reuptake inhibitor - patient declined at this time.    -     MENTAL HEALTH REFERRAL  - Adult; Outpatient Treatment; Individual/Couples/Family/Group Therapy/Health Psychology; [facility]: [facility] [phone]; We will contact you to schedule the appointment or please call with any questions      Risks and Recommendations:  The patient has the following additional risks and recommendations for  perioperative complications:   - No identified additional risk factors other than previously addressed    Medication Instructions:  Patient is to take all scheduled medications on the day of surgery EXCEPT for modifications listed below:   - Diuretics: HOLD on the day of surgery.    RECOMMENDATION:  APPROVAL GIVEN to proceed with proposed procedure, without further diagnostic evaluation.        }    Subjective     HPI related to upcoming procedure:     55 year old female with history of perianal Crohn's disease and anal fistula with prior seton.  Worsening of anal pain/fistulas over the past few months.  Planning seton replacement and flexible sigmoidoscopy.          1. No - Have you ever had a heart attack or stroke?  2. No - Have you ever had surgery on your heart or blood vessels, such as a stent, coronary (heart) bypass, or surgery on an artery in the head, neck, heart, or legs?  3. No - Do you have chest pain when you are physically active?  4. No - Do you have a history of heart failure?  5. No - Do you currently have a cold, bronchitis, or symptoms of other respiratory (head and chest) infections?  6. No - Do you have a cough, shortness of breath, or wheezing?  7. No - Do you or anyone in your family have a history of blood clots?  8. No - Do you or anyone in your family have a serious bleeding problem, such as long-lasting bleeding after surgeries or cuts?  9. No - Have you ever had anemia or been told to take iron pills?  10. No - Have you had any abnormal blood loss such as black, tarry or bloody stools, or abnormal vaginal bleeding?  11. No - Have you ever had a blood transfusion?  12. Yes - Are you willing to have a blood transfusion if it is medically needed before, during, or after your surgery?  13. No - Have you or anyone in your family ever had problems with anesthesia (sedation for surgery)?  14. No - Do you have sleep apnea, excessive snoring, or daytime drowsiness?   15. No - Do you have any  artifical heart valves or other implanted medical devices, such as a pacemaker, defibrillator, or continuous glucose monitor?  16. No - Do you have any artifical joints?  17. No - Are you allergic to latex?  18. No - Is there any chance that you may be pregnant?    Health Care Directive:  Patient does not have a Health Care Directive or Living Will: Discussed advance care planning with patient; however, patient declined at this time.    Preoperative Review of :     reviewed - controlled substances reflected in medication list.  Last opioid prescription was from 4/25/2020      Status of Chronic Conditions:  See problem list for active medical problems.  Problems all longstanding and stable, except as noted/documented.  See ROS for pertinent symptoms related to these conditions.      Review of Systems  CONSTITUTIONAL: NEGATIVE for fever, chills, change in weight  INTEGUMENTARY/SKIN: NEGATIVE for worrisome rashes, moles or lesions  EYES: NEGATIVE for vision changes or irritation  ENT/MOUTH: NEGATIVE for ear, mouth and throat problems  RESP: NEGATIVE for significant cough or SOB  BREAST: NEGATIVE for masses, tenderness or discharge  CV: NEGATIVE for chest pain, palpitations or peripheral edema  GI: NEGATIVE for nausea, abdominal pain, heartburn, or change in bowel habits, chronic diarrhea and pain in rectum/anus  : NEGATIVE for frequency, dysuria, or hematuria  MUSCULOSKELETAL: NEGATIVE for significant arthralgias or myalgia  NEURO: NEGATIVE for weakness, dizziness or paresthesias  ENDOCRINE: NEGATIVE for temperature intolerance, skin/hair changes  HEME: NEGATIVE for bleeding problems  PSYCHIATRIC: POSITIVE for mood/anxiety  PHQ 2/23/2021   PHQ-9 Total Score 7   Q9: Thoughts of better off dead/self-harm past 2 weeks Not at all     TAINA-7 SCORE 9/27/2013 9/29/2014 2/23/2021   Total Score 0 - -   Total Score - 2 -   Total Score - - 6         Patient Active Problem List    Diagnosis Date Noted     Alcoholic fatty  liver 05/14/2014     Priority: High     Acquired hyperbilirubinemia- ? secondary to alcohol use 05/14/2014     Priority: High     Atypical ductal hyperplasia of left breast 10/04/2010     Priority: High     Abscess of anal or rectal region 02/08/2021     Priority: Medium     Added automatically from request for surgery [id number]       Cholecystitis 10/22/2019     Priority: Medium     Alcoholic cirrhosis of liver with ascites (H) - seeing [facility]  10/22/2019     Priority: Medium     Biliary colic 10/21/2019     Priority: Medium     Severe Perianal Crohn's Disease 07/23/2019     Priority: Medium     Stool incontinence 09/30/2014     Priority: Medium     CARDIOVASCULAR SCREENING; LDL GOAL LESS THAN 130 09/30/2014     Priority: Medium     Gastroenteritis 05/20/2014     Priority: Medium     Diffuse nodular cirrhosis of liver (H) 05/20/2014     Priority: Medium     CT abdomen from 2/2019   IMPRESSION:  1. Cirrhotic liver with evidence of portal venous hypertension,  including gastroesophageal varices and probable hemorrhoids. The  spleen size is upper normal. No ascites.  2. Cholelithiasis.       AA (alcohol abuse) - ? ongoing  05/14/2014     Priority: Medium     Per hospital d/c summary 5/2014: Alcoholic cirrhosis of liver with small ascites and ultrasound consistent with liver nodule.  She had elevated LFTs and was higher in 2013.  Her hepatitis C and B antigens are negative and her hepatitis A antigen and antibody also was negative.  The patient had a long history of alcohol use.  She drinks about 4 drinks every other day or every day and the patient was advised alcohol cessation and was explained that her liver was damaged although her LFTs are decreased from levels of 09/2013 but I think she already developed cirrhosis of the liver and without stopping alcohol she will progress to full blown cirrhosis of the liver with portal hypertension and understand the consequences and patient understood she needs to go to AA and  alcohol treatment.  The patient was placed with alcohol withdrawal protocol with Ativan started with vitamins during her admission here.  The patient understood the consequence of continued alcohol use.         Essential hypertension with goal blood pressure less than 140/90 06/18/2013     Priority: Medium     Abnormal liver enzymes- ? related to ongoing etoh use/abuse 10/12/2012     Priority: Medium     S/P total hysterectomy and bilateral salpingo-oophorectomy 09/26/2012     Priority: Medium     Claustrophobia 09/19/2011     Priority: Medium     Postmenopausal- s/p hysterectomy with BSO - not on HRT secondary to atypical ductal hyperplasia & breast pain -no paps needed 08/17/2011     Priority: Medium     Idiopathic thrombocytopenia (H) 03/01/2011     Priority: Medium     Rosacea 08/24/2010     Priority: Medium     Iron deficiency anemia, unspecified iron deficiency anemia type 05/08/2006     Priority: Medium     Problem list name updated by automated process. Provider to review       Intestinal infection due to Clostridium difficile 03/09/2006     Priority: Medium     Other isolated or specific phobias 12/06/2005     Priority: Medium     fear of flying - gets Ativan prn.        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 Provider to review and confirm.       Contact dermatitis and other eczema, due to unspecified cause 12/06/2005     Priority: Medium     perianal  - recurrent - clobetasol        Non morbid obesity due to excess calories 12/23/2003     Priority: Medium     Problem list name updated by automated process. Provider to review        Past Medical History:   Diagnosis Date     Alcohol abuse      Alcoholic cirrhosis of liver with ascites      Atypical ductal hyperplasia of breast 9/10/10    ERT not recommended -left - and flat epithelial atypia-scheduled for breast biopsy 9/17/2010      Bifid uvula      Cholelithiasis      Contact  perianal dermatitis and other eczema     recurrent - clobetasol      Crohn disease      Fear of flying      - gets Ativan prn.      Hypertension      IBS (irritable bowel syndrome)      Past Surgical History:   Procedure Laterality Date     BIOPSY ANAL N/A 3/28/2019    anal biopsy and culure placement of seton - Dr Fleming     BIOPSY BREAST Left 09/17/2010    - scheduled with Dr. Varma      BIOPSY LIVER  2019     C APPENDECTOMY  at age 14     COLONOSCOPY  2006     COLONOSCOPY N/A 12/23/2014    Procedure: COMBINED COLONOSCOPY, SINGLE OR MULTIPLE BIOPSY/POLYPECTOMY BY BIOPSY;  Surgeon: Diane Fleming MD;  Location:  GI     COLONOSCOPY N/A 12/5/2019    Procedure: COLONOSCOPY, WITH POLYPECTOMY AND BIOPSY;  Surgeon: Farhan Schilling MD;  Location: [facility]     ENDOSCOPIC RETROGRADE CHOLANGIOPANCREATOGRAM N/A 4/24/2020    Procedure: ENDOSCOPIC RETROGRADE CHOLANGIOPANCREATOGRAPHY WITH, sledge removal,sphincterotomy, stent in gallbladder and pancreatic duct stent, and balloon dilation;  Surgeon: Guru Isac Kraft MD;  Location:  OR     ESOPHAGOSCOPY, GASTROSCOPY, DUODENOSCOPY (EGD), COMBINED N/A 12/5/2019    Procedure: ESOPHAGOGASTRODUODENOSCOPY (EGD);  Surgeon: Farhan Schilling MD;  [facility]     EXAM UNDER ANESTHESIA ANUS N/A 3/28/2019    Procedure: EXAM UNDER ANESTHESIA ANUS;  Surgeon: Diane Fleming MD;  Location:  OR     HYSTERECTOMY, VAGINAL  2006    with Dr. Licha Zhou - with BSO for fibroids      IR GALLBLADDER DRAIN PLACEMENT  4/22/2020     OPEN REDUCTION INTERNAL FIXATION ANKLE Left at age 27    plates and screws removed at age 36     Pelviscopy with removal of bilateral hydrosalpinges.  04/15/2010     Current Outpatient Medications   Medication Sig Dispense Refill     inFLIXimab (REMICADE) 100 MG injection Inject into the vein once for 1 dose       acetaminophen (TYLENOL) 325 MG tablet Take 1-2 tablets (325-650 mg) by mouth every 6 hours as needed for mild  "pain Take either 325mg every 6 hours or 650mg every 8 hours for pain. Do not exceed 2000mg in 24 hours. 100 tablet 0     furosemide (LASIX) 40 MG tablet Take 1 tablet (40 mg) by mouth daily 90 tablet 3     Milk Thistle-Dand-Fennel-Licor (MILK THISTLE XTRA) CAPS capsule Take 1 capsule by mouth daily       Multiple Vitamins-Minerals (MULTIVITAMIN & MINERAL PO) Take  by mouth daily.       spironolactone (ALDACTONE) 100 MG tablet Take 1 tablet (100 mg) by mouth daily 90 tablet 3       Allergies   Allergen Reactions     Fish Oil      Redness and itching around eye area only - went away when fish oil capsules stopped      Metronidazole      pain/itching     Naphthalenemethylamines      Lamisil = mild urticarial reaction     Ppd [Tuberculin Purified Protein Derivative]      Sulfa Drugs      hives        Social History     Tobacco Use     Smoking status: Never Smoker     Smokeless tobacco: Never Used   Substance Use Topics     Alcohol use: Yes     Alcohol/week: 0.0 standard drinks     Comment: occasional     Family History   Problem Relation Age of Onset     Breast Cancer Mother      Gastrointestinal Disease Mother      Heart Disease Father 57     Heart Disease Paternal Grandfather      Hypertension Maternal Grandmother      Diabetes Paternal Grandmother      Diabetes Maternal Grandfather      Cancer Sister      History   Drug Use No     Comment: no herbal meds either         Objective     /86   Pulse 101   Temp 97.4  F (36.3  C)   Ht 1.651 m (5' 5\")   Wt 81.2 kg (179 lb)   LMP 05/31/2006   SpO2 98%   BMI 29.79 kg/m      Physical Exam    GENERAL APPEARANCE: healthy, alert and no distress     EYES: EOMI, PERRL     HENT: ear canals and TM's normal     NECK: no adenopathy, no asymmetry, masses, or scars and thyroid normal to palpation     RESP: lungs clear to auscultation - no rales, rhonchi or wheezes     CV: regular rates and rhythm, normal S1 S2, no S3 or S4 and no murmur, click or rub     ABDOMEN:  soft, " nontender, no HSM or masses and bowel sounds normal     MS: extremities normal- no gross deformities noted, no evidence of inflammation in joints, FROM in all extremities.     SKIN: no suspicious lesions or rashes     NEURO: Normal strength and tone, sensory exam grossly normal, mentation intact and speech normal     PSYCH: mentation appears normal. and affect normal/bright     LYMPHATICS: No cervical adenopathy    Recent Labs   Lab Test 10/27/20  0925 09/04/20  1413 04/25/20  0617 04/25/20  0617 04/24/20  0623   HGB 13.7 13.9   < > 12.4 13.4   PLT 96* 100*   < > 71* 82*   INR 1.28*  --   --   --  1.33*   NA  --  135  --  134 135   POTASSIUM  --  4.5  --  4.6 4.2   CR  --  0.73  --  0.67 0.78    < > = values in this interval not displayed.        Diagnostics:    Labs pending at this time.  Results will be reviewed when available.     No EKG required, no history of coronary heart disease, significant arrhythmia, peripheral arterial disease or other structural heart disease.    Revised Cardiac Risk Index (RCRI):    The patient has the following serious cardiovascular risks for perioperative complications:   - No serious cardiac risks = 0 points     RCRI Interpretation: 0 points: Class I (very low risk - 0.4% complication rate)             Signed Electronically by: DARYN Lucas CNP  Copy of this evaluation report is provided to requesting physician.    [facility] Guidelines    Revised Cardiac Risk Index

## 2021-02-23 NOTE — PROGRESS NOTES
[address] [address]  Phone: [phone]  Primary Provider: Linda Macdonald        PREOPERATIVE EVALUATION:  Today's date: 2/23/2021    Abiola Matute is a 55 year old female who presents for a preoperative evaluation.    Surgical Information:  Surgery/Procedure: EXAM UNDER ANESTHESIA, ANUS, PLACEMENT, SETON STITCH  Surgery Location: [facility]   Surgeon: Dr. Nam  Surgery Date: 2/26/21  Time of Surgery: 11:30 AM  Where patient plans to recover: At home with family  Fax number for surgical facility: Note does not need to be faxed, will be available electronically in Epic.    Type of Anesthesia Anticipated: Monitor Care Anesthesia     Assessment & Plan     The proposed surgical procedure is considered INTERMEDIATE risk.    Abiola was seen today for pre-op exam.    Diagnoses and all orders for this visit:    Preoperative examination  -     CBC with platelets  -     Basic metabolic panel  (Ca, Cl, CO2, Creat, Gluc, K, Na, BUN)    Anal fistula  Severe Perianal Crohn's Disease  Planning seton placement and flexible sigmoidoscopy.        Adjustment disorder with mixed anxiety and depressed mood  PHQ 2/23/2021   PHQ-9 Total Score 7   Q9: Thoughts of better off dead/self-harm past 2 weeks Not at all     TAINA-7 SCORE 9/27/2013 9/29/2014 2/23/2021   Total Score 0 - -   Total Score - 2 -   Total Score - - 6     Mood/anxiety concerns which is being affected by chronic pain.  Initiate therapy at this time.   Consider selective serotonin reuptake inhibitor - patient declined at this time.    -     MENTAL HEALTH REFERRAL  - Adult; Outpatient Treatment; Individual/Couples/Family/Group Therapy/Health Psychology[place] [facility] [place] [facility] [phone]; We will contact you to schedule the appointment or please call with any questions      Risks and Recommendations:  The patient has the following additional risks and recommendations for  perioperative complications:   - No identified additional risk factors other than previously addressed    Medication Instructions:  Patient is to take all scheduled medications on the day of surgery EXCEPT for modifications listed below:   - Diuretics: HOLD on the day of surgery.    RECOMMENDATION:  APPROVAL GIVEN to proceed with proposed procedure, without further diagnostic evaluation.        }    Subjective     HPI related to upcoming procedure:     55 year old female with history of perianal Crohn's disease and anal fistula with prior seton.  Worsening of anal pain/fistulas over the past few months.  Planning seton replacement and flexible sigmoidoscopy.          1. No - Have you ever had a heart attack or stroke?  2. No - Have you ever had surgery on your heart or blood vessels, such as a stent, coronary (heart) bypass, or surgery on an artery in the head, neck, heart, or legs?  3. No - Do you have chest pain when you are physically active?  4. No - Do you have a history of heart failure?  5. No - Do you currently have a cold, bronchitis, or symptoms of other respiratory (head and chest) infections?  6. No - Do you have a cough, shortness of breath, or wheezing?  7. No - Do you or anyone in your family have a history of blood clots?  8. No - Do you or anyone in your family have a serious bleeding problem, such as long-lasting bleeding after surgeries or cuts?  9. No - Have you ever had anemia or been told to take iron pills?  10. No - Have you had any abnormal blood loss such as black, tarry or bloody stools, or abnormal vaginal bleeding?  11. No - Have you ever had a blood transfusion?  12. Yes - Are you willing to have a blood transfusion if it is medically needed before, during, or after your surgery?  13. No - Have you or anyone in your family ever had problems with anesthesia (sedation for surgery)?  14. No - Do you have sleep apnea, excessive snoring, or daytime drowsiness?   15. No - Do you have any  artifical heart valves or other implanted medical devices, such as a pacemaker, defibrillator, or continuous glucose monitor?  16. No - Do you have any artifical joints?  17. No - Are you allergic to latex?  18. No - Is there any chance that you may be pregnant?    Health Care Directive:  Patient does not have a Health Care Directive or Living Will: Discussed advance care planning with patient; however, patient declined at this time.    Preoperative Review of :     reviewed - controlled substances reflected in medication list.  Last opioid prescription was from 4/25/2020      Status of Chronic Conditions:  See problem list for active medical problems.  Problems all longstanding and stable, except as noted/documented.  See ROS for pertinent symptoms related to these conditions.      Review of Systems  CONSTITUTIONAL: NEGATIVE for fever, chills, change in weight  INTEGUMENTARY/SKIN: NEGATIVE for worrisome rashes, moles or lesions  EYES: NEGATIVE for vision changes or irritation  ENT/MOUTH: NEGATIVE for ear, mouth and throat problems  RESP: NEGATIVE for significant cough or SOB  BREAST: NEGATIVE for masses, tenderness or discharge  CV: NEGATIVE for chest pain, palpitations or peripheral edema  GI: NEGATIVE for nausea, abdominal pain, heartburn, or change in bowel habits, chronic diarrhea and pain in rectum/anus  : NEGATIVE for frequency, dysuria, or hematuria  MUSCULOSKELETAL: NEGATIVE for significant arthralgias or myalgia  NEURO: NEGATIVE for weakness, dizziness or paresthesias  ENDOCRINE: NEGATIVE for temperature intolerance, skin/hair changes  HEME: NEGATIVE for bleeding problems  PSYCHIATRIC: POSITIVE for mood/anxiety  PHQ 2/23/2021   PHQ-9 Total Score 7   Q9: Thoughts of better off dead/self-harm past 2 weeks Not at all     TAINA-7 SCORE 9/27/2013 9/29/2014 2/23/2021   Total Score 0 - -   Total Score - 2 -   Total Score - - 6         Patient Active Problem List    Diagnosis Date Noted     Alcoholic fatty  liver 05/14/2014     Priority: High     Acquired hyperbilirubinemia- ? secondary to alcohol use 05/14/2014     Priority: High     Atypical ductal hyperplasia of left breast 10/04/2010     Priority: High     Abscess of anal or rectal region 02/08/2021     Priority: Medium     Added automatically from request for surgery [id number]       Cholecystitis 10/22/2019     Priority: Medium     Alcoholic cirrhosis of liver with ascites (H) - seeing [facility]  10/22/2019     Priority: Medium     Biliary colic 10/21/2019     Priority: Medium     Severe Perianal Crohn's Disease 07/23/2019     Priority: Medium     Stool incontinence 09/30/2014     Priority: Medium     CARDIOVASCULAR SCREENING; LDL GOAL LESS THAN 130 09/30/2014     Priority: Medium     Gastroenteritis 05/20/2014     Priority: Medium     Diffuse nodular cirrhosis of liver (H) 05/20/2014     Priority: Medium     CT abdomen from 2/2019   IMPRESSION:  1. Cirrhotic liver with evidence of portal venous hypertension,  including gastroesophageal varices and probable hemorrhoids. The  spleen size is upper normal. No ascites.  2. Cholelithiasis.       AA (alcohol abuse) - ? ongoing  05/14/2014     Priority: Medium     Per hospital d/c summary 5/2014: Alcoholic cirrhosis of liver with small ascites and ultrasound consistent with liver nodule.  She had elevated LFTs and was higher in 2013.  Her hepatitis C and B antigens are negative and her hepatitis A antigen and antibody also was negative.  The patient had a long history of alcohol use.  She drinks about 4 drinks every other day or every day and the patient was advised alcohol cessation and was explained that her liver was damaged although her LFTs are decreased from levels of 09/2013 but I think she already developed cirrhosis of the liver and without stopping alcohol she will progress to full blown cirrhosis of the liver with portal hypertension and understand the consequences and patient understood she needs to go to AA and  alcohol treatment.  The patient was placed with alcohol withdrawal protocol with Ativan started with vitamins during her admission here.  The patient understood the consequence of continued alcohol use.         Essential hypertension with goal blood pressure less than 140/90 06/18/2013     Priority: Medium     Abnormal liver enzymes- ? related to ongoing etoh use/abuse 10/12/2012     Priority: Medium     S/P total hysterectomy and bilateral salpingo-oophorectomy 09/26/2012     Priority: Medium     Claustrophobia 09/19/2011     Priority: Medium     Postmenopausal- s/p hysterectomy with BSO - not on HRT secondary to atypical ductal hyperplasia & breast pain -no paps needed 08/17/2011     Priority: Medium     Idiopathic thrombocytopenia (H) 03/01/2011     Priority: Medium     Rosacea 08/24/2010     Priority: Medium     Iron deficiency anemia, unspecified iron deficiency anemia type 05/08/2006     Priority: Medium     Problem list name updated by automated process. Provider to review       Intestinal infection due to Clostridium difficile 03/09/2006     Priority: Medium     Other isolated or specific phobias 12/06/2005     Priority: Medium     fear of flying - gets Ativan prn.        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 Provider to review and confirm.       Contact dermatitis and other eczema, due to unspecified cause 12/06/2005     Priority: Medium     perianal  - recurrent - clobetasol        Non morbid obesity due to excess calories 12/23/2003     Priority: Medium     Problem list name updated by automated process. Provider to review        Past Medical History:   Diagnosis Date     Alcohol abuse      Alcoholic cirrhosis of liver with ascites      Atypical ductal hyperplasia of breast 9/10/10    ERT not recommended -left - and flat epithelial atypia-scheduled for breast biopsy 9/17/2010      Bifid uvula      Cholelithiasis      Contact  perianal dermatitis and other eczema     recurrent - clobetasol      Crohn disease      Fear of flying      - gets Ativan prn.      Hypertension      IBS (irritable bowel syndrome)      Past Surgical History:   Procedure Laterality Date     BIOPSY ANAL N/A 3/28/2019    anal biopsy and culure placement of seton - Dr Fleming     BIOPSY BREAST Left 09/17/2010    - scheduled with Dr. Varma      BIOPSY LIVER  2019     C APPENDECTOMY  at age 14     COLONOSCOPY  2006     COLONOSCOPY N/A 12/23/2014    Procedure: COMBINED COLONOSCOPY, SINGLE OR MULTIPLE BIOPSY/POLYPECTOMY BY BIOPSY;  Surgeon: Diane Fleming MD;  Location:  GI     COLONOSCOPY N/A 12/5/2019    Procedure: COLONOSCOPY, WITH POLYPECTOMY AND BIOPSY;  Surgeon: Farhan Schilling MD;  Location: [facility]     ENDOSCOPIC RETROGRADE CHOLANGIOPANCREATOGRAM N/A 4/24/2020    Procedure: ENDOSCOPIC RETROGRADE CHOLANGIOPANCREATOGRAPHY WITH, sledge removal,sphincterotomy, stent in gallbladder and pancreatic duct stent, and balloon dilation;  Surgeon: Guru Isac Kraft MD;  Location:  OR     ESOPHAGOSCOPY, GASTROSCOPY, DUODENOSCOPY (EGD), COMBINED N/A 12/5/2019    Procedure: ESOPHAGOGASTRODUODENOSCOPY (EGD);  Surgeon: Farhan Schilling MD;  [facility]: [facility]     EXAM UNDER ANESTHESIA ANUS N/A 3/28/2019    Procedure: EXAM UNDER ANESTHESIA ANUS;  Surgeon: Diane Fleming MD;  Location:  OR     HYSTERECTOMY, VAGINAL  2006    with Dr. Licha Zhou - with BSO for fibroids      IR GALLBLADDER DRAIN PLACEMENT  4/22/2020     OPEN REDUCTION INTERNAL FIXATION ANKLE Left at age 27    plates and screws removed at age 36     Pelviscopy with removal of bilateral hydrosalpinges.  04/15/2010     Current Outpatient Medications   Medication Sig Dispense Refill     inFLIXimab (REMICADE) 100 MG injection Inject into the vein once for 1 dose       acetaminophen (TYLENOL) 325 MG tablet Take 1-2 tablets (325-650 mg) by mouth every 6 hours as needed for mild  "pain Take either 325mg every 6 hours or 650mg every 8 hours for pain. Do not exceed 2000mg in 24 hours. 100 tablet 0     furosemide (LASIX) 40 MG tablet Take 1 tablet (40 mg) by mouth daily 90 tablet 3     Milk Thistle-Dand-Fennel-Licor (MILK THISTLE XTRA) CAPS capsule Take 1 capsule by mouth daily       Multiple Vitamins-Minerals (MULTIVITAMIN & MINERAL PO) Take  by mouth daily.       spironolactone (ALDACTONE) 100 MG tablet Take 1 tablet (100 mg) by mouth daily 90 tablet 3       Allergies   Allergen Reactions     Fish Oil      Redness and itching around eye area only - went away when fish oil capsules stopped      Metronidazole      pain/itching     Naphthalenemethylamines      Lamisil = mild urticarial reaction     Ppd [Tuberculin Purified Protein Derivative]      Sulfa Drugs      hives        Social History     Tobacco Use     Smoking status: Never Smoker     Smokeless tobacco: Never Used   Substance Use Topics     Alcohol use: Yes     Alcohol/week: 0.0 standard drinks     Comment: occasional     Family History   Problem Relation Age of Onset     Breast Cancer Mother      Gastrointestinal Disease Mother      Heart Disease Father 57     Heart Disease Paternal Grandfather      Hypertension Maternal Grandmother      Diabetes Paternal Grandmother      Diabetes Maternal Grandfather      Cancer Sister      History   Drug Use No     Comment: no herbal meds either         Objective     /86   Pulse 101   Temp 97.4  F (36.3  C)   Ht 1.651 m (5' 5\")   Wt 81.2 kg (179 lb)   LMP 05/31/2006   SpO2 98%   BMI 29.79 kg/m      Physical Exam    GENERAL APPEARANCE: healthy, alert and no distress     EYES: EOMI, PERRL     HENT: ear canals and TM's normal     NECK: no adenopathy, no asymmetry, masses, or scars and thyroid normal to palpation     RESP: lungs clear to auscultation - no rales, rhonchi or wheezes     CV: regular rates and rhythm, normal S1 S2, no S3 or S4 and no murmur, click or rub     ABDOMEN:  soft, " nontender, no HSM or masses and bowel sounds normal     MS: extremities normal- no gross deformities noted, no evidence of inflammation in joints, FROM in all extremities.     SKIN: no suspicious lesions or rashes     NEURO: Normal strength and tone, sensory exam grossly normal, mentation intact and speech normal     PSYCH: mentation appears normal. and affect normal/bright     LYMPHATICS: No cervical adenopathy    Recent Labs   Lab Test 10/27/20  0925 09/04/20  1413 04/25/20  0617 04/25/20  0617 04/24/20  0623   HGB 13.7 13.9   < > 12.4 13.4   PLT 96* 100*   < > 71* 82*   INR 1.28*  --   --   --  1.33*   NA  --  135  --  134 135   POTASSIUM  --  4.5  --  4.6 4.2   CR  --  0.73  --  0.67 0.78    < > = values in this interval not displayed.        Diagnostics:    Labs pending at this time.  Results will be reviewed when available.     No EKG required, no history of coronary heart disease, significant arrhythmia, peripheral arterial disease or other structural heart disease.    Revised Cardiac Risk Index (RCRI):    The patient has the following serious cardiovascular risks for perioperative complications:   - No serious cardiac risks = 0 points     RCRI Interpretation: 0 points: Class I (very low risk - 0.4% complication rate)             Signed Electronically by: DARYN Lucas CNP  Copy of this evaluation report is provided to requesting physician.    [facility] Guidelines    Revised Cardiac Risk Index

## 2021-02-24 ENCOUNTER — TELEPHONE (OUTPATIENT)
Dept: GASTROENTEROLOGY | Facility: OUTPATIENT CENTER | Age: 56
End: 2021-02-24

## 2021-02-24 LAB
ANION GAP SERPL CALCULATED.3IONS-SCNC: 8 MMOL/L (ref 3–14)
BUN SERPL-MCNC: 12 MG/DL (ref 7–30)
CALCIUM SERPL-MCNC: 9.4 MG/DL (ref 8.5–10.1)
CHLORIDE SERPL-SCNC: 106 MMOL/L (ref 94–109)
CO2 SERPL-SCNC: 23 MMOL/L (ref 20–32)
CREAT SERPL-MCNC: 0.71 MG/DL (ref 0.52–1.04)
GFR SERPL CREATININE-BSD FRML MDRD: >90 ML/MIN/{1.73_M2}
GLUCOSE SERPL-MCNC: 95 MG/DL (ref 70–99)
LABORATORY COMMENT REPORT: NORMAL
POTASSIUM SERPL-SCNC: 4.1 MMOL/L (ref 3.4–5.3)
SARS-COV-2 RNA RESP QL NAA+PROBE: NEGATIVE
SARS-COV-2 RNA RESP QL NAA+PROBE: NORMAL
SODIUM SERPL-SCNC: 137 MMOL/L (ref 133–144)
SPECIMEN SOURCE: NORMAL
SPECIMEN SOURCE: NORMAL

## 2021-02-24 ASSESSMENT — ANXIETY QUESTIONNAIRES: GAD7 TOTAL SCORE: 6

## 2021-02-24 NOTE — RESULT ENCOUNTER NOTE
Dear [place],     -Kidney function is normal (Cr, GFR), Sodium is normal, Potassium is normal, Calcium is normal, Glucose is normal.       Please send a [employer] message or call [phone]  if you have any questions.      Linda Macdonald, DARYN, [facility] [place]    If you have further questions about the interpretation of your labs, [email or web address] is a good website to check out for further information.

## 2021-02-24 NOTE — TELEPHONE ENCOUNTER
Patient taking any blood thinners ? No      Heart disease ? Denies      Lung disease ? Denies      Sleep apnea ? Denies      Diabetic ? Denies      Kidney disease ?Denies      Electronic implanted medical devices ? Denies       PTSD ? N/a      Prep instructions reviewed with patient ? Instructions, policy,MAC sedation plan reviewed. Advised patient to have someone stay with them for 24 hours post exam.     Yes    Pharmacy : N/a     Indication for procedure :   Crohn's disease of large intestine with fistula (H) [K50.113]  - Primary       Proctitis              Referring provider : Linda Macdonald APRN CNP      Arrival Time : 7:30 AM

## 2021-02-25 ENCOUNTER — TELEPHONE (OUTPATIENT)
Dept: SURGERY | Facility: CLINIC | Age: 56
End: 2021-02-25

## 2021-02-25 ENCOUNTER — ANESTHESIA EVENT (OUTPATIENT)
Dept: SURGERY | Facility: AMBULATORY SURGERY CENTER | Age: 56
End: 2021-02-25

## 2021-02-25 DIAGNOSIS — Z11.59 ENCOUNTER FOR SCREENING FOR OTHER VIRAL DISEASES: ICD-10-CM

## 2021-02-25 LAB
SARS-COV-2 RNA RESP QL NAA+PROBE: NORMAL
SPECIMEN SOURCE: NORMAL

## 2021-02-25 PROCEDURE — U0003 INFECTIOUS AGENT DETECTION BY NUCLEIC ACID (DNA OR RNA); SEVERE ACUTE RESPIRATORY SYNDROME CORONAVIRUS 2 (SARS-COV-2) (CORONAVIRUS DISEASE [COVID-19]), AMPLIFIED PROBE TECHNIQUE, MAKING USE OF HIGH THROUGHPUT TECHNOLOGIES AS DESCRIBED BY CMS-2020-01-R: HCPCS | Performed by: INTERNAL MEDICINE

## 2021-02-25 PROCEDURE — U0005 INFEC AGEN DETEC AMPLI PROBE: HCPCS | Performed by: INTERNAL MEDICINE

## 2021-02-25 NOTE — TELEPHONE ENCOUNTER
Phone call received from HANNON Nurse, she says she called patient about surgery tomorrow, and patient states she was unaware that she needed to do 2 Fleet Enemas before surgery. She thought she only needed to do them for her Flex Sig at [facility] on Monday.

## 2021-02-26 ENCOUNTER — ANESTHESIA (OUTPATIENT)
Dept: SURGERY | Facility: AMBULATORY SURGERY CENTER | Age: 56
End: 2021-02-26

## 2021-02-26 ENCOUNTER — HOSPITAL ENCOUNTER (OUTPATIENT)
Facility: AMBULATORY SURGERY CENTER | Age: 56
Discharge: HOME | End: 2021-02-26
Attending: COLON & RECTAL SURGERY | Admitting: COLON & RECTAL SURGERY
Payer: COMMERCIAL

## 2021-02-26 VITALS
DIASTOLIC BLOOD PRESSURE: 74 MMHG | HEART RATE: 76 BPM | SYSTOLIC BLOOD PRESSURE: 113 MMHG | RESPIRATION RATE: 16 BRPM | HEIGHT: 66 IN | OXYGEN SATURATION: 100 % | WEIGHT: 178 LBS | BODY MASS INDEX: 28.61 KG/M2 | TEMPERATURE: 97.6 F

## 2021-02-26 DIAGNOSIS — K61.2 ABSCESS OF ANAL OR RECTAL REGION: ICD-10-CM

## 2021-02-26 LAB
LABORATORY COMMENT REPORT: NORMAL
SARS-COV-2 RNA RESP QL NAA+PROBE: NEGATIVE
SPECIMEN SOURCE: NORMAL

## 2021-02-26 PROCEDURE — 46020 PLACEMENT OF SETON: CPT

## 2021-02-26 PROCEDURE — 88305 TISSUE EXAM BY PATHOLOGIST: CPT | Mod: GC | Performed by: PATHOLOGY

## 2021-02-26 RX ORDER — NALOXONE HYDROCHLORIDE 0.4 MG/ML
0.4 INJECTION, SOLUTION INTRAMUSCULAR; INTRAVENOUS; SUBCUTANEOUS
Status: DISCONTINUED | OUTPATIENT
Start: 2021-02-26 | End: 2021-02-27 | Stop reason: HOSPADM

## 2021-02-26 RX ORDER — LIDOCAINE 40 MG/G
CREAM TOPICAL
Status: DISCONTINUED | OUTPATIENT
Start: 2021-02-26 | End: 2021-02-26 | Stop reason: HOSPADM

## 2021-02-26 RX ORDER — BUPIVACAINE HYDROCHLORIDE AND EPINEPHRINE 2.5; 5 MG/ML; UG/ML
INJECTION, SOLUTION INFILTRATION; PERINEURAL PRN
Status: DISCONTINUED | OUTPATIENT
Start: 2021-02-26 | End: 2021-02-26 | Stop reason: HOSPADM

## 2021-02-26 RX ORDER — FENTANYL CITRATE 50 UG/ML
25-50 INJECTION, SOLUTION INTRAMUSCULAR; INTRAVENOUS
Status: DISCONTINUED | OUTPATIENT
Start: 2021-02-26 | End: 2021-02-26 | Stop reason: HOSPADM

## 2021-02-26 RX ORDER — ONDANSETRON 2 MG/ML
4 INJECTION INTRAMUSCULAR; INTRAVENOUS ONCE
Status: DISCONTINUED | OUTPATIENT
Start: 2021-02-26 | End: 2021-02-26 | Stop reason: HOSPADM

## 2021-02-26 RX ORDER — PROPOFOL 10 MG/ML
INJECTION, EMULSION INTRAVENOUS CONTINUOUS PRN
Status: DISCONTINUED | OUTPATIENT
Start: 2021-02-26 | End: 2021-02-26

## 2021-02-26 RX ORDER — MEPERIDINE HYDROCHLORIDE 25 MG/ML
12.5 INJECTION INTRAMUSCULAR; INTRAVENOUS; SUBCUTANEOUS
Status: DISCONTINUED | OUTPATIENT
Start: 2021-02-26 | End: 2021-02-27 | Stop reason: HOSPADM

## 2021-02-26 RX ORDER — ONDANSETRON 4 MG/1
4 TABLET, ORALLY DISINTEGRATING ORAL EVERY 30 MIN PRN
Status: DISCONTINUED | OUTPATIENT
Start: 2021-02-26 | End: 2021-02-27 | Stop reason: HOSPADM

## 2021-02-26 RX ORDER — SODIUM CHLORIDE, SODIUM LACTATE, POTASSIUM CHLORIDE, CALCIUM CHLORIDE 600; 310; 30; 20 MG/100ML; MG/100ML; MG/100ML; MG/100ML
INJECTION, SOLUTION INTRAVENOUS CONTINUOUS
Status: DISCONTINUED | OUTPATIENT
Start: 2021-02-26 | End: 2021-02-27 | Stop reason: HOSPADM

## 2021-02-26 RX ORDER — ACETAMINOPHEN 325 MG/1
650 TABLET ORAL 4 TIMES DAILY
Qty: 100 TABLET | Refills: 0 | Status: SHIPPED | OUTPATIENT
Start: 2021-02-26 | End: 2021-03-12

## 2021-02-26 RX ORDER — SODIUM CHLORIDE, SODIUM LACTATE, POTASSIUM CHLORIDE, CALCIUM CHLORIDE 600; 310; 30; 20 MG/100ML; MG/100ML; MG/100ML; MG/100ML
INJECTION, SOLUTION INTRAVENOUS CONTINUOUS PRN
Status: DISCONTINUED | OUTPATIENT
Start: 2021-02-26 | End: 2021-02-26

## 2021-02-26 RX ORDER — ONDANSETRON 2 MG/ML
4 INJECTION INTRAMUSCULAR; INTRAVENOUS EVERY 30 MIN PRN
Status: DISCONTINUED | OUTPATIENT
Start: 2021-02-26 | End: 2021-02-27 | Stop reason: HOSPADM

## 2021-02-26 RX ORDER — KETOROLAC TROMETHAMINE 30 MG/ML
30 INJECTION, SOLUTION INTRAMUSCULAR; INTRAVENOUS EVERY 6 HOURS PRN
Status: DISCONTINUED | OUTPATIENT
Start: 2021-02-26 | End: 2021-02-27 | Stop reason: HOSPADM

## 2021-02-26 RX ORDER — NALOXONE HYDROCHLORIDE 0.4 MG/ML
0.2 INJECTION, SOLUTION INTRAMUSCULAR; INTRAVENOUS; SUBCUTANEOUS
Status: DISCONTINUED | OUTPATIENT
Start: 2021-02-26 | End: 2021-02-27 | Stop reason: HOSPADM

## 2021-02-26 RX ORDER — ONDANSETRON 2 MG/ML
INJECTION INTRAMUSCULAR; INTRAVENOUS PRN
Status: DISCONTINUED | OUTPATIENT
Start: 2021-02-26 | End: 2021-02-26

## 2021-02-26 RX ORDER — SODIUM CHLORIDE, SODIUM LACTATE, POTASSIUM CHLORIDE, CALCIUM CHLORIDE 600; 310; 30; 20 MG/100ML; MG/100ML; MG/100ML; MG/100ML
INJECTION, SOLUTION INTRAVENOUS CONTINUOUS
Status: DISCONTINUED | OUTPATIENT
Start: 2021-02-26 | End: 2021-02-26 | Stop reason: HOSPADM

## 2021-02-26 RX ORDER — ONDANSETRON 4 MG/1
4 TABLET, ORALLY DISINTEGRATING ORAL
Status: DISCONTINUED | OUTPATIENT
Start: 2021-02-26 | End: 2021-02-27 | Stop reason: HOSPADM

## 2021-02-26 RX ORDER — ALBUTEROL SULFATE 0.83 MG/ML
2.5 SOLUTION RESPIRATORY (INHALATION) EVERY 4 HOURS PRN
Status: DISCONTINUED | OUTPATIENT
Start: 2021-02-26 | End: 2021-02-26 | Stop reason: HOSPADM

## 2021-02-26 RX ORDER — LIDOCAINE HYDROCHLORIDE 20 MG/ML
INJECTION, SOLUTION INFILTRATION; PERINEURAL PRN
Status: DISCONTINUED | OUTPATIENT
Start: 2021-02-26 | End: 2021-02-26

## 2021-02-26 RX ORDER — ACETAMINOPHEN 325 MG/1
975 TABLET ORAL ONCE
Status: COMPLETED | OUTPATIENT
Start: 2021-02-26 | End: 2021-02-26

## 2021-02-26 RX ORDER — ACETAMINOPHEN 325 MG/1
975 TABLET ORAL ONCE
Status: DISCONTINUED | OUTPATIENT
Start: 2021-02-26 | End: 2021-02-26 | Stop reason: HOSPADM

## 2021-02-26 RX ORDER — FENTANYL CITRATE 50 UG/ML
INJECTION, SOLUTION INTRAMUSCULAR; INTRAVENOUS PRN
Status: DISCONTINUED | OUTPATIENT
Start: 2021-02-26 | End: 2021-02-26

## 2021-02-26 RX ORDER — GABAPENTIN 300 MG/1
300 CAPSULE ORAL ONCE
Status: COMPLETED | OUTPATIENT
Start: 2021-02-26 | End: 2021-02-26

## 2021-02-26 RX ORDER — OXYCODONE HYDROCHLORIDE 5 MG/1
5 TABLET ORAL EVERY 4 HOURS PRN
Status: DISCONTINUED | OUTPATIENT
Start: 2021-02-26 | End: 2021-02-27 | Stop reason: HOSPADM

## 2021-02-26 RX ADMIN — FENTANYL CITRATE 50 MCG: 50 INJECTION, SOLUTION INTRAMUSCULAR; INTRAVENOUS at 09:13

## 2021-02-26 RX ADMIN — ONDANSETRON 4 MG: 2 INJECTION INTRAMUSCULAR; INTRAVENOUS at 09:25

## 2021-02-26 RX ADMIN — GABAPENTIN 300 MG: 300 CAPSULE ORAL at 07:55

## 2021-02-26 RX ADMIN — LIDOCAINE HYDROCHLORIDE 80 MG: 20 INJECTION, SOLUTION INFILTRATION; PERINEURAL at 09:13

## 2021-02-26 RX ADMIN — PROPOFOL 100 MCG/KG/MIN: 10 INJECTION, EMULSION INTRAVENOUS at 09:13

## 2021-02-26 RX ADMIN — SODIUM CHLORIDE, SODIUM LACTATE, POTASSIUM CHLORIDE, CALCIUM CHLORIDE: 600; 310; 30; 20 INJECTION, SOLUTION INTRAVENOUS at 09:09

## 2021-02-26 RX ADMIN — ACETAMINOPHEN 975 MG: 325 TABLET ORAL at 07:55

## 2021-02-26 ASSESSMENT — MIFFLIN-ST. JEOR: SCORE: 1414.15

## 2021-02-26 NOTE — DISCHARGE INSTRUCTIONS
[facility]  Home Care Following Anesthesia  For 24 hours after surgery:  1. Get plenty of rest.  A responsible adult must stay with you for at least 24 hours after you leave the surgery center.  2. Do not drive or use heavy equipment.  If you have weakness or tingling, don't drive or use heavy equipment until this feeling goes away.   3. Do not drink alcohol.   4. Avoid strenuous or risky activities.  Ask for help when climbing stairs.  5. You may feel lightheaded.  IF so, sit for a few minutes before standing.  Have someone help you get up.   6. If you have nausea (feel sick to your stomach): Drink only clear liquids such as apple juice, ginger ale, broth or 7-Up.  Rest may also help.  Be sure to drink enough fluids.  Move to a regular diet as you feel able.   7. You may have a slight fever.  Call the doctor if your fever is over 100 F (37.7 C) (taken under the tongue) or lasts longer than 24 hours.  8. You may have a dry mouth, a sore throat, muscle aches or trouble sleeping. These should go away after 24 hours.  9. Do not make important or legal decisions.               Tips for taking pain medications  To get the best pain relief possible, remember these points:    Take pain medications as directed, before pain becomes severe.    Pain medication can upset your stomach: taking it with food may help.    Constipation is a common side effect of pain medication. Drink plenty of  fluids.    Eat foods high in fiber. Take a stool softener if recommended by your doctor or pharmacist.    Do not drink alcohol, drive or operate machinery while taking pain medications.    Ask about other ways to control pain, such as with heat, ice or relaxation.    Tylenol/Acetaminophen Consumption  To help encourage the safe use of acetaminophen, the makers of TYLENOL  have lowered the maximum daily dose for single-ingredient Extra Strength TYLENOL  (acetaminophen) products sold in the [place] from 8  pills per day (4,000 mg) to 6 pills per day (3,000 mg). The dosing interval has also changed from 2 pills every 4-6 hours to 2 pills every 6 hours.    If you feel your pain relief is insufficient, you may take Tylenol/Acetaminophen in addition to your narcotic pain medication.     Be careful not to exceed 3,000 mg of Tylenol/Acetaminophen in a 24 hour period from all sources.    If you are taking extra strength Tylenol/acetaminophen (500 mg), the maximum dose is 6 tablets in 24 hours.    If you are taking regular strength acetaminophen (325 mg), the maximum dose is 9 tablets in 24 hours.    Call a doctor for any of the followin. Signs of infection (fever, growing tenderness at the surgery site, a large amount of drainage or bleeding, severe pain, foul-smelling drainage, redness, swelling).  2. It has been over 8 to 10 hours since surgery and you are still not able to urinate (pass water).  3. Headache for over 24 hours.  4. Numbness, tingling or weakness the day after surgery (if you had spinal anesthesia).  5. Signs of Covid-19 infection (temperature over 100 degrees, shortness of breath, cough, loss of taste/smell, generalized body aches, persistent headache, chills, sore throat, nausea/vomiting/diarrhea)  Your doctor is:       Dr. Avtar Nam, Colon Rectal: [phone]               Or dial [phone] and ask for the resident on call for:  Colon Rectal  For emergency care, call the:  [facility]:  [phone] (TTY for hearing impaired: [phone])

## 2021-02-26 NOTE — BRIEF OP NOTE
[facility]    Brief Operative Note    Pre-operative diagnosis: Abscess of anal or rectal region [K61.2]  Post-operative diagnosis Perianal fistula    Procedure: Procedure(s):  EXAM UNDER ANESTHESIA, ANUS  PLACEMENT, SETON STITCH  Surgeon: Surgeon(s) and Role:     * Avtar Nam MD - Primary     * Sheridan Velasquez MD - Resident - Assisting  Anesthesia: Monitor Anesthesia Care   Estimated blood loss: 1 mL  Drains: Red seton x1  Specimens: * No specimens in log *  Findings:   Posterior perianal fistula with red seton placed. Additional rectovaginal fistula noted. .  Complications: None.  Implants: * No implants in log *      Sheridan Velasquez MD  PGY-1 Surgery

## 2021-02-26 NOTE — ANESTHESIA PREPROCEDURE EVALUATION
Anesthesia Pre-Procedure Evaluation    Patient: Abiola Matute   MRN: [id number] : 1965        Preoperative Diagnosis: Abscess of anal or rectal region [K61.2]   Procedure : Procedure(s):  EXAM UNDER ANESTHESIA, ANUS  PLACEMENT, SETON STITCH     Past Medical History:   Diagnosis Date     Alcohol abuse      Alcoholic cirrhosis of liver with ascites      Atypical ductal hyperplasia of breast 9/10/10    ERT not recommended -left - and flat epithelial atypia-scheduled for breast biopsy 2010      Bifid uvula      Cholelithiasis      Contact perianal dermatitis and other eczema     recurrent - clobetasol      Crohn disease      Fear of flying      - gets Ativan prn.      Hypertension      IBS (irritable bowel syndrome)       Past Surgical History:   Procedure Laterality Date     BIOPSY ANAL N/A 3/28/2019    anal biopsy and culure placement of seton - Dr Fleming     BIOPSY BREAST Left 2010    - scheduled with Dr. Varma      BIOPSY LIVER       C APPENDECTOMY  at age 14     COLONOSCOPY       COLONOSCOPY N/A 2014    Procedure: COMBINED COLONOSCOPY, SINGLE OR MULTIPLE BIOPSY/POLYPECTOMY BY BIOPSY;  Surgeon: Diane Fleming MD;  Location:  GI     COLONOSCOPY N/A 2019    Procedure: COLONOSCOPY, WITH POLYPECTOMY AND BIOPSY;  Surgeon: Farhan Schilling MD;  Location: [facility]     ENDOSCOPIC RETROGRADE CHOLANGIOPANCREATOGRAM N/A 2020    Procedure: ENDOSCOPIC RETROGRADE CHOLANGIOPANCREATOGRAPHY WITH, sledge removal,sphincterotomy, stent in gallbladder and pancreatic duct stent, and balloon dilation;  Surgeon: Guru Isac Kraft MD;  Location: [facility]     ESOPHAGOSCOPY, GASTROSCOPY, DUODENOSCOPY (EGD), COMBINED N/A 2019    Procedure: ESOPHAGOGASTRODUODENOSCOPY (EGD);  Surgeon: Farhan Schilling MD;  Location: [facility]     EXAM UNDER ANESTHESIA ANUS N/A 3/28/2019    Procedure: EXAM UNDER ANESTHESIA ANUS;  Surgeon: Diane Fleming MD;  Location:  OR      HYSTERECTOMY, VAGINAL  2006    with Dr. Licha Zhou - with BSO for fibroids      IR GALLBLADDER DRAIN PLACEMENT  4/22/2020     OPEN REDUCTION INTERNAL FIXATION ANKLE Left at age 27    plates and screws removed at age 36     Pelviscopy with removal of bilateral hydrosalpinges.  04/15/2010      Allergies   Allergen Reactions     Fish Oil      Redness and itching around eye area only - went away when fish oil capsules stopped      Metronidazole      pain/itching     Naphthalenemethylamines      Lamisil = mild urticarial reaction     Ppd [Tuberculin Purified Protein Derivative]      Sulfa Drugs      hives      Social History     Tobacco Use     Smoking status: Never Smoker     Smokeless tobacco: Never Used   Substance Use Topics     Alcohol use: Not Currently     Alcohol/week: 0.0 standard drinks     Comment: occasional      Wt Readings from Last 1 Encounters:   02/26/21 80.7 kg (178 lb)        Anesthesia Evaluation   Pt has had prior anesthetic. Type: General.    No history of anesthetic complications       ROS/MED HX  ENT/Pulmonary:  - neg pulmonary ROS     Neurologic:  - neg neurologic ROS     Cardiovascular:     (+) hypertension-----    METS/Exercise Tolerance:     Hematologic:  - neg hematologic  ROS     Musculoskeletal:  - neg musculoskeletal ROS     GI/Hepatic: Comment: crohns    (+) liver disease,     Renal/Genitourinary:  - neg Renal ROS     Endo:  - neg endo ROS     Psychiatric/Substance Use:     (+) alcohol abuse     Infectious Disease:  - neg infectious disease ROS     Malignancy:       Other:            Physical Exam    Airway  airway exam normal           Respiratory Devices and Support         Dental  no notable dental history         Cardiovascular   cardiovascular exam normal          Pulmonary   pulmonary exam normal                OUTSIDE LABS:  CBC:   Lab Results   Component Value Date    WBC 3.3 (L) 02/23/2021    WBC 3.5 (L) 10/27/2020    HGB 15.2 02/23/2021    HGB 13.7 10/27/2020    HCT 44.6  02/23/2021    HCT 39.5 10/27/2020    PLT 98 (L) 02/23/2021    PLT 96 (L) 10/27/2020     BMP:   Lab Results   Component Value Date     02/23/2021     09/04/2020    POTASSIUM 4.1 02/23/2021    POTASSIUM 4.5 09/04/2020    CHLORIDE 106 02/23/2021    CHLORIDE 104 09/04/2020    CO2 23 02/23/2021    CO2 25 09/04/2020    BUN 12 02/23/2021    BUN 12 09/04/2020    CR 0.71 02/23/2021    CR 0.73 09/04/2020    GLC 95 02/23/2021    GLC 91 09/04/2020     COAGS:   Lab Results   Component Value Date    PTT 41 (H) 04/21/2020    INR 1.28 (H) 10/27/2020     POC:   Lab Results   Component Value Date     (H) 04/24/2020    HCG Negative 06/07/2006     HEPATIC:   Lab Results   Component Value Date    ALBUMIN 3.3 (L) 10/27/2020    PROTTOTAL 7.9 10/27/2020    ALT 40 10/27/2020    AST 54 (H) 10/27/2020     (H) 11/02/2017    ALKPHOS 129 10/27/2020    BILITOTAL 1.9 (H) 10/27/2020     OTHER:   Lab Results   Component Value Date    A1C 4.6 09/27/2017    ARIEL 9.4 02/23/2021    MAG 2.0 05/21/2014    LIPASE 205 04/25/2020    AMYLASE 79 04/24/2020    TSH 1.56 12/07/2018    CRP 3.4 10/27/2020    SED 29 10/27/2020       Anesthesia Plan    ASA Status:  2   NPO Status:  NPO Appropriate    Anesthesia Type: MAC.   Induction: Intravenous.   Maintenance: Balanced.        Consents    Anesthesia Plan(s) and associated risks, benefits, and realistic alternatives discussed. Questions answered and patient/representative(s) expressed understanding.     - Discussed with:  Patient         Postoperative Care    Pain management: Oral pain medications.   PONV prophylaxis: Ondansetron (or other 5HT-3)     Comments:                Luis Angel Mcallister MD, MD

## 2021-02-26 NOTE — OP NOTE
Procedure Date: 02/26/2021      PREOPERATIVE DIAGNOSES:   1.  Crohn disease.   2.  Crohn anal fistula.   3.  Alcoholic cirrhosis with ascites.   4.  Fecal incontinence.   5.  Essential hypertension.   6.  Thrombocytopenia.      POSTOPERATIVE DIAGNOSES:     1.  Crohn disease.   2.  Crohn anal fistula.   3.  Alcoholic cirrhosis with ascites.   4.  Fecal incontinence.   5.  Essential hypertension.   6.  Thrombocytopenia.   7.  Perianal skin tags and skin bridge.   8.  Anovaginal fistula.      HISTORY:  Abiola Matute is a 55-year-old woman with a history of perianal Crohn disease, for which she underwent seton placement by Dr. Diane Fleming on 03/28/2019.  Her seton fell out, and as she was feeling well, she did not pursue replacement.  She developed new pain and minor drainage and was seen in [facility].  An MRI on 10/23/2020 demonstrated an intersphincteric perianal fistula.  Examination under anesthesia with seton replacement was recommended.  I discussed the risks and alternatives in detail with the patient.  I answered all of her questions to her stated satisfaction.  She expressed her understanding and provided written informed consent.      SURGEON:  Avtar Nam MD      ASSISTANT:  Sheridan Velasquez MD      DESCRIPTION OF PROCEDURE:  With the patient in the prone jackknife position and buttocks taped apart, under intravenous sedation by Anesthesia, the perianal skin was prepped and draped in a sterile fashion.  A time out was performed and the patient and procedure confirmed.  Local infiltration of 0.25% Marcaine with epinephrine was utilized, and a satisfactory perianal block was obtained.  Examination under anesthesia demonstrated scattered perianal skin tags, most prominent on the left side of the anus, and a long, thin skin bridge in the left posterior aspect of the anal canal.  There was an external fistula opening in the posterior midline, but no other external openings were noted.  A  fistula probe could easily be passed through this into the very distal anus just inside the anal margin.  This involved only a very short slip of internal sphincter muscle, 1 cm or less.  Because of the patient's Crohn disease and because we had not discussed possibly dividing a tract, I placed a red Silastic vessel loop seton around this fistula and secured it with interrupted 0 silk LigaSures.  I am not convinced that this tiny tract would likely be a cause of symptoms, with or without a seton, or that the division of this tract would necessarily adversely affect the patient's continence.  Anoscopy showed healthy-appearing rectal mucosa to the dentate line.  Anteriorly, there was an obvious internal opening above the dentate line, and this tracked very easily through an epithelialized tract into the vagina, confirming the presence of an anovaginal fistula.  I did not think this would be benefited by seton placement, so it was simply left alone.  I felt the left posterior skin bridge was probably adding to difficulties with maintaining perianal hygiene, and as it was a very minimal-risk procedure to excise it, I did so at either end using electrocautery.  There was no bleeding.  The skin tag was sent for pathology.  The procedure was then terminated.  There was no blood loss.  Sponge, needle and instrument counts were reported as correct at the conclusion of the case x2.  There were no immediate operative complications.         OSVALDO DRPAER MD             D: 2021   T: 2021   MT: abdon      Name:     JOAN MCNAMRAA   MRN:      [id number]        Account:        [id number]   :      1965           Procedure Date: 2021      Document: [id number]       cc: Farhan Light MSN, NP-C       Sheridan Velasquez MD

## 2021-02-26 NOTE — ANESTHESIA POSTPROCEDURE EVALUATION
Patient: Abiola Matute    Procedure(s):  EXAM UNDER ANESTHESIA OF ANUS, SETON PLACEMENT, EXCISION OF SKIN BRIDGE    Diagnosis:Abscess of anal or rectal region [K61.2]  Diagnosis Additional Information: No value filed.    Anesthesia Type:  MAC    Note:  Disposition: Outpatient   Postop Pain Control: Uneventful            Sign Out: Well controlled pain   PONV: No   Neuro/Psych: Uneventful            Sign Out: Acceptable/Baseline neuro status   Airway/Respiratory: Uneventful            Sign Out: Acceptable/Baseline resp. status   CV/Hemodynamics: Uneventful            Sign Out: Acceptable CV status   Other NRE: NONE   DID A NON-ROUTINE EVENT OCCUR? No         Last vitals:  Vitals:    02/26/21 0734 02/26/21 0938 02/26/21 0953   BP: (!) 134/93 107/73 113/74   Pulse: 85 91 76   Resp: 18 16 16   Temp: 36.1  C (96.9  F) 36.4  C (97.6  F)    SpO2: 98% 96% 100%       Last vitals prior to Anesthesia Care Transfer:  CRNA VITALS  2/26/2021 0905 - 2/26/2021 1005      2/26/2021             Resp Rate (set):  10          Electronically Signed By: Luis Angel Mcallister MD, MD  February 26, 2021  10:26 AM

## 2021-02-26 NOTE — ANESTHESIA CARE TRANSFER NOTE
Patient: Abiola Matute    Procedure(s):  EXAM UNDER ANESTHESIA OF ANUS, SETON PLACEMENT, EXCISION OF SKIN BRIDGE    Diagnosis: Abscess of anal or rectal region [K61.2]  Diagnosis Additional Information: No value filed.    Anesthesia Type:   MAC     Note:      Level of Consciousness: awake  Oxygen Supplementation: room air    Independent Airway: airway patency satisfactory and stable        Patient transferred to: Phase II    Handoff Report: Identifed the Patient, Identified the Reponsible Provider, Reviewed the pertinent medical history, Discussed the surgical course, Reviewed Intra-OP anesthesia mangement and issues during anesthesia, Set expectations for post-procedure period and Allowed opportunity for questions and acknowledgement of understanding      Vitals: (Last set prior to Anesthesia Care Transfer)  CRNA VITALS  2/26/2021 0905 - 2/26/2021 0941      2/26/2021             Resp Rate (set):  10        Electronically Signed By: DARYN Lopez CRNA  February 26, 2021  9:41 AM

## 2021-03-01 ENCOUNTER — DOCUMENTATION ONLY (OUTPATIENT)
Dept: GASTROENTEROLOGY | Facility: OUTPATIENT CENTER | Age: 56
End: 2021-03-01
Attending: INTERNAL MEDICINE
Payer: COMMERCIAL

## 2021-03-01 ENCOUNTER — TRANSFERRED RECORDS (OUTPATIENT)
Dept: HEALTH INFORMATION MANAGEMENT | Facility: CLINIC | Age: 56
End: 2021-03-01

## 2021-03-01 ENCOUNTER — PATIENT OUTREACH (OUTPATIENT)
Dept: GASTROENTEROLOGY | Facility: CLINIC | Age: 56
End: 2021-03-01

## 2021-03-01 DIAGNOSIS — K50.113 CROHN'S DISEASE OF LARGE INTESTINE WITH FISTULA (H): ICD-10-CM

## 2021-03-01 DIAGNOSIS — K50.113 CROHN'S DISEASE OF LARGE INTESTINE WITH FISTULA (H): Primary | ICD-10-CM

## 2021-03-01 DIAGNOSIS — K62.89 PROCTITIS: ICD-10-CM

## 2021-03-01 LAB — COPATH REPORT: NORMAL

## 2021-03-01 NOTE — PROGRESS NOTES
Virginia is getting remicade tomorrow through [facility]   Do you think we can get a Remicade level as she is on higher dosing      Patient has infusion on March 2 at 9 am    Contacted [facility] and verbal order given to Roshan for infliximab level.

## 2021-03-02 ENCOUNTER — HOSPITAL ENCOUNTER (OUTPATIENT)
Dept: LAB | Facility: CLINIC | Age: 56
Discharge: HOME | End: 2021-03-02
Attending: INTERNAL MEDICINE | Admitting: INTERNAL MEDICINE
Payer: COMMERCIAL

## 2021-03-02 ENCOUNTER — MEDICAL CORRESPONDENCE (OUTPATIENT)
Dept: HEALTH INFORMATION MANAGEMENT | Facility: CLINIC | Age: 56
End: 2021-03-02

## 2021-03-02 DIAGNOSIS — K50.113 CROHN'S DISEASE OF LARGE INTESTINE WITH FISTULA (H): ICD-10-CM

## 2021-03-02 LAB
ALBUMIN SERPL-MCNC: 3.1 G/DL (ref 3.4–5)
ALP SERPL-CCNC: 108 U/L (ref 40–150)
ALT SERPL W P-5'-P-CCNC: 39 U/L (ref 0–50)
AST SERPL W P-5'-P-CCNC: 49 U/L (ref 0–45)
BASOPHILS # BLD AUTO: 0 10E9/L (ref 0–0.2)
BASOPHILS NFR BLD AUTO: 0.9 %
BILIRUB DIRECT SERPL-MCNC: 0.5 MG/DL (ref 0–0.2)
BILIRUB SERPL-MCNC: 1.3 MG/DL (ref 0.2–1.3)
COPATH REPORT: NORMAL
CRP SERPL-MCNC: 4.7 MG/L (ref 0–8)
DIFFERENTIAL METHOD BLD: ABNORMAL
EOSINOPHIL # BLD AUTO: 0.2 10E9/L (ref 0–0.7)
EOSINOPHIL NFR BLD AUTO: 6.9 %
ERYTHROCYTE [DISTWIDTH] IN BLOOD BY AUTOMATED COUNT: 13.9 % (ref 10–15)
ERYTHROCYTE [SEDIMENTATION RATE] IN BLOOD BY WESTERGREN METHOD: 22 MM/H (ref 0–30)
HCT VFR BLD AUTO: 43.2 % (ref 35–47)
HGB BLD-MCNC: 13.9 G/DL (ref 11.7–15.7)
IMM GRANULOCYTES # BLD: 0 10E9/L (ref 0–0.4)
IMM GRANULOCYTES NFR BLD: 0.3 %
LYMPHOCYTES # BLD AUTO: 1.1 10E9/L (ref 0.8–5.3)
LYMPHOCYTES NFR BLD AUTO: 31.8 %
MCH RBC QN AUTO: 34.9 PG (ref 26.5–33)
MCHC RBC AUTO-ENTMCNC: 32.2 G/DL (ref 31.5–36.5)
MCV RBC AUTO: 109 FL (ref 78–100)
MONOCYTES # BLD AUTO: 0.4 10E9/L (ref 0–1.3)
MONOCYTES NFR BLD AUTO: 10.8 %
NEUTROPHILS # BLD AUTO: 1.6 10E9/L (ref 1.6–8.3)
NEUTROPHILS NFR BLD AUTO: 49.3 %
NRBC # BLD AUTO: 0 10*3/UL
NRBC BLD AUTO-RTO: 0 /100
PLATELET # BLD AUTO: 80 10E9/L (ref 150–450)
PROT SERPL-MCNC: 7.4 G/DL (ref 6.8–8.8)
RBC # BLD AUTO: 3.98 10E12/L (ref 3.8–5.2)
WBC # BLD AUTO: 3.3 10E9/L (ref 4–11)

## 2021-03-02 PROCEDURE — 999N001145 HC STATISTIC INFLIXIMAB: Performed by: INTERNAL MEDICINE

## 2021-03-02 PROCEDURE — 85652 RBC SED RATE AUTOMATED: CPT | Performed by: INTERNAL MEDICINE

## 2021-03-02 PROCEDURE — 85025 COMPLETE CBC W/AUTO DIFF WBC: CPT | Performed by: INTERNAL MEDICINE

## 2021-03-02 PROCEDURE — 36415 COLL VENOUS BLD VENIPUNCTURE: CPT | Performed by: INTERNAL MEDICINE

## 2021-03-02 PROCEDURE — 86140 C-REACTIVE PROTEIN: CPT | Performed by: INTERNAL MEDICINE

## 2021-03-02 PROCEDURE — 80076 HEPATIC FUNCTION PANEL: CPT | Performed by: INTERNAL MEDICINE

## 2021-03-02 NOTE — PROGRESS NOTES
Patient called back and stated that infusion nurse could not draw infliximab level as unable to get back to lab quick enough. Pt volunteered to go to lab near her home before infusion

## 2021-03-04 LAB — LAB SCANNED RESULT: NORMAL

## 2021-03-16 ENCOUNTER — PATIENT OUTREACH (OUTPATIENT)
Dept: CARE COORDINATION | Facility: CLINIC | Age: 56
End: 2021-03-16

## 2021-03-16 NOTE — PROGRESS NOTES
Clinic Care Coordination Contact  [facility]/Voicemail       Clinical Data: Care Coordinator Outreach  Outreach attempted x 1.  Left message on patient's voicemail with call back information and requested return call.    Plan:  Care Coordinator will try to reach patient again in 10 business days.    REY Hensley  Clinic Care Coordination  [facility] : [place], [place], and [place]  Phone: [phone]    ______________________  Next Outreach:  03/23/21  Planned Outreach Frequency: Monthly  Preferred Phone Number: [phone]    Enrollment Date:  01/13/21  Last Care Plan Assessment:  01/13/21

## 2021-03-19 ENCOUNTER — OFFICE VISIT (OUTPATIENT)
Dept: SURGERY | Facility: CLINIC | Age: 56
End: 2021-03-19
Payer: COMMERCIAL

## 2021-03-19 VITALS
HEART RATE: 91 BPM | WEIGHT: 175.2 LBS | HEIGHT: 65 IN | DIASTOLIC BLOOD PRESSURE: 91 MMHG | SYSTOLIC BLOOD PRESSURE: 138 MMHG | OXYGEN SATURATION: 98 % | BODY MASS INDEX: 29.19 KG/M2

## 2021-03-19 DIAGNOSIS — K60.30 ANAL FISTULA: ICD-10-CM

## 2021-03-19 DIAGNOSIS — Z09 FOLLOW-UP EXAMINATION AFTER COLORECTAL SURGERY: Primary | ICD-10-CM

## 2021-03-19 PROCEDURE — 99212 OFFICE O/P EST SF 10 MIN: CPT | Performed by: NURSE PRACTITIONER

## 2021-03-19 ASSESSMENT — PAIN SCALES - GENERAL: PAINLEVEL: SEVERE PAIN (7)

## 2021-03-19 ASSESSMENT — MIFFLIN-ST. JEOR: SCORE: 1385.58

## 2021-03-19 NOTE — LETTER
"3/19/2021       RE: Abiola Matute  [address] [address]     Dear Colleague,    Thank you for referring your patient, Abiola Matute, to the [facility] [place] [facility]. Please see a copy of my visit note below.    Colon and Rectal Surgery Postoperative Clinic Note    RE: Abiola Matute  : 1965  MICHELLE: 3/19/2021    Abiola Matute is a very pleasant 55 year old female with Crohn's disease with anal fistula who is now status post examination under anesthesia with replacement of seton and excision of anal skin tag on 21 with Dr. Nam.     FINAL DIAGNOSIS:   ANUS, LEFT POSTERIOR SKIN BRIDGE, BIOPSY:   - Hyperkeratotic skin with underlying dermal fibrosis   - Negative for granuloma formation and dysplasia     Interval history: Virginia has been doing well but has some \"needle like\" pain. No bleeding. Some minor leakage of stool after bowel movements. Stools are soft. She is not bothered by the seton. She is doing sitz baths 2-3 times a day.     Physical Examination: Exam was chaperoned by Marilia Guy MA   BP (!) 138/91 (BP Location: Left arm, Patient Position: Sitting, Cuff Size: Adult Regular)   Pulse 91   Ht 5' 5\"   Wt 175 lb 3.2 oz   LMP 2006   SpO2 98%   BMI 29.15 kg/m    General: alert, oriented, in no acute distress, sitting comfortably  HEENT: mucous membranes moist  Perianal external examination:  Small abrasion in the posterior midline. Red seton in place with ties external to the tract.   Digital rectal examination: Was deferred.    Anoscopy: Was deferred.    Assessment/Plan:  55 year old female status post examination under anesthesia with replacement of seton and excision of anal skin tag on 21 with Dr. Nam. She has a small abrasion versus superficially unhealed wound in the posterior midline. This is the site of her pain. Continue with warm tub baths " several times a day and use a zinc barrier cream. Will also have her try topical lidocaine. Seton will need to be exchanged yearly and she may be best served with having this done in the OR. Patient's questions were answered to her stated satisfaction and she is in agreement with this plan.    Medical history:  Past Medical History:   Diagnosis Date     Alcohol abuse      Alcoholic cirrhosis of liver with ascites      Atypical ductal hyperplasia of breast 9/10/10    ERT not recommended -left - and flat epithelial atypia-scheduled for breast biopsy 9/17/2010      Bifid uvula      Cholelithiasis      Contact perianal dermatitis and other eczema     recurrent - clobetasol      Crohn disease      Fear of flying      - gets Ativan prn.      Hypertension      IBS (irritable bowel syndrome)        Surgical history:  Past Surgical History:   Procedure Laterality Date     BIOPSY ANAL N/A 3/28/2019    anal biopsy and culure placement of seton - Dr Fleming     BIOPSY BREAST Left 09/17/2010    - scheduled with Dr. Varma      BIOPSY LIVER  2019     C APPENDECTOMY  at age 14     COLONOSCOPY  2006     COLONOSCOPY N/A 12/23/2014    Procedure: COMBINED COLONOSCOPY, SINGLE OR MULTIPLE BIOPSY/POLYPECTOMY BY BIOPSY;  Surgeon: Diane Fleming MD;  Location:  GI     COLONOSCOPY N/A 12/5/2019    Procedure: COLONOSCOPY, WITH POLYPECTOMY AND BIOPSY;  Surgeon: Farhan Schilling MD;  Location:  GI     ENDOSCOPIC RETROGRADE CHOLANGIOPANCREATOGRAM N/A 4/24/2020    Procedure: ENDOSCOPIC RETROGRADE CHOLANGIOPANCREATOGRAPHY WITH, sledge removal,sphincterotomy, stent in gallbladder and pancreatic duct stent, and balloon dilation;  Surgeon: Guru Isac Kraft MD;  Location: [facility]     ESOPHAGOSCOPY, GASTROSCOPY, DUODENOSCOPY (EGD), COMBINED N/A 12/5/2019    Procedure: ESOPHAGOGASTRODUODENOSCOPY (EGD);  Surgeon: Farhan Schilling MD;  [facility]     EXAM UNDER ANESTHESIA ANUS N/A 3/28/2019    Procedure: EXAM  UNDER ANESTHESIA ANUS;  Surgeon: Diane Fleming MD;  Location:  OR     EXAM UNDER ANESTHESIA ANUS N/A 2/26/2021    Procedure: EXAM UNDER ANESTHESIA OF ANUS, SETON PLACEMENT, EXCISION OF SKIN BRIDGE;  Surgeon: Avtar Nam MD;  Location: [facility] OR     HYSTERECTOMY, VAGINAL  2006    with Dr. Licha Zhou - with BSO for fibroids      IR GALLBLADDER DRAIN PLACEMENT  4/22/2020     OPEN REDUCTION INTERNAL FIXATION ANKLE Left at age 27    plates and screws removed at age 36     Pelviscopy with removal of bilateral hydrosalpinges.  04/15/2010       Problem list:  Patient Active Problem List    Diagnosis Date Noted     Alcoholic fatty liver 05/14/2014     Priority: High     Acquired hyperbilirubinemia- ? secondary to alcohol use 05/14/2014     Priority: High     Atypical ductal hyperplasia of left breast 10/04/2010     Priority: High     Abscess of anal or rectal region 02/08/2021     Priority: Medium     Added automatically from request for surgery [id number]       Cholecystitis 10/22/2019     Priority: Medium     Alcoholic cirrhosis of liver with ascites (H) - seeing [facility]  10/22/2019     Priority: Medium     Biliary colic 10/21/2019     Priority: Medium     Severe Perianal Crohn's Disease 07/23/2019     Priority: Medium     Stool incontinence 09/30/2014     Priority: Medium     CARDIOVASCULAR SCREENING; LDL GOAL LESS THAN 130 09/30/2014     Priority: Medium     Gastroenteritis 05/20/2014     Priority: Medium     Diffuse nodular cirrhosis of liver (H) 05/20/2014     Priority: Medium     CT abdomen from 2/2019   IMPRESSION:  1. Cirrhotic liver with evidence of portal venous hypertension,  including gastroesophageal varices and probable hemorrhoids. The  spleen size is upper normal. No ascites.  2. Cholelithiasis.       AA (alcohol abuse) - ? ongoing  05/14/2014     Priority: Medium     Per hospital d/c summary 5/2014: Alcoholic cirrhosis of liver with small ascites and ultrasound consistent with liver nodule.  She had  elevated LFTs and was higher in 2013.  Her hepatitis C and B antigens are negative and her hepatitis A antigen and antibody also was negative.  The patient had a long history of alcohol use.  She drinks about 4 drinks every other day or every day and the patient was advised alcohol cessation and was explained that her liver was damaged although her LFTs are decreased from levels of 09/2013 but I think she already developed cirrhosis of the liver and without stopping alcohol she will progress to full blown cirrhosis of the liver with portal hypertension and understand the consequences and patient understood she needs to go to AA and alcohol treatment.  The patient was placed with alcohol withdrawal protocol with Ativan started with vitamins during her admission here.  The patient understood the consequence of continued alcohol use.         Essential hypertension with goal blood pressure less than 140/90 06/18/2013     Priority: Medium     Abnormal liver enzymes- ? related to ongoing etoh use/abuse 10/12/2012     Priority: Medium     S/P total hysterectomy and bilateral salpingo-oophorectomy 09/26/2012     Priority: Medium     Claustrophobia 09/19/2011     Priority: Medium     Postmenopausal- s/p hysterectomy with BSO - not on HRT secondary to atypical ductal hyperplasia & breast pain -no paps needed 08/17/2011     Priority: Medium     Idiopathic thrombocytopenia (H) 03/01/2011     Priority: Medium     Rosacea 08/24/2010     Priority: Medium     Iron deficiency anemia, unspecified iron deficiency anemia type 05/08/2006     Priority: Medium     Problem list name updated by automated process. Provider to review       Intestinal infection due to Clostridium difficile 03/09/2006     Priority: Medium     Other isolated or specific phobias 12/06/2005     Priority: Medium     fear of flying - gets Ativan prn.        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 Provider to review and confirm.       Contact dermatitis and other eczema, due to unspecified cause 12/06/2005     Priority: Medium     perianal  - recurrent - clobetasol        Non morbid obesity due to excess calories 12/23/2003     Priority: Medium     Problem list name updated by automated process. Provider to review         Medications:  Current Outpatient Medications   Medication Sig Dispense Refill     acetaminophen (TYLENOL) 325 MG tablet Take 1-2 tablets (325-650 mg) by mouth every 6 hours as needed for mild pain Take either 325mg every 6 hours or 650mg every 8 hours for pain. Do not exceed 2000mg in 24 hours. 100 tablet 0     furosemide (LASIX) 40 MG tablet Take 1 tablet (40 mg) by mouth daily 90 tablet 3     inFLIXimab (REMICADE) 100 MG injection Inject into the vein once for 1 dose       Milk Thistle-Dand-Fennel-Licor (MILK THISTLE XTRA) CAPS capsule Take 1 capsule by mouth daily       Multiple Vitamins-Minerals (MULTIVITAMIN & MINERAL PO) Take  by mouth daily.       spironolactone (ALDACTONE) 100 MG tablet Take 1 tablet (100 mg) by mouth daily 90 tablet 3       Allergies:  Allergies   Allergen Reactions     Fish Oil      Redness and itching around eye area only - went away when fish oil capsules stopped      Metronidazole      pain/itching     Naphthalenemethylamines      Lamisil = mild urticarial reaction     Ppd [Tuberculin Purified Protein Derivative]      Sulfa Drugs      hives       Family history:  Family History   Problem Relation Age of Onset     Breast Cancer Mother      Gastrointestinal Disease Mother      Heart Disease Father 57     Heart Disease Paternal Grandfather      Hypertension Maternal Grandmother      Diabetes Paternal Grandmother      Diabetes Maternal Grandfather      Cancer Sister        Social history:  Social History     Tobacco Use     Smoking status: Never Smoker     Smokeless tobacco: Never Used   Substance Use Topics     Alcohol use: Not Currently     Alcohol/week:  "0.0 standard drinks     Comment: occasional     Marital status: .    Nursing Notes:   Kassandra Schaefer  3/19/2021 11:43 AM  Signed  Chief Complaint   Patient presents with     Post-op Visit     PO DOS 2/26/2021 seton replacement        Vitals:    03/19/21 1138   BP: (!) 138/91   BP Location: Left arm   Patient Position: Sitting   Cuff Size: Adult Regular   Pulse: 91   SpO2: 98%   Weight: 175 lb 3.2 oz   Height: 5' 5\"       Body mass index is 29.15 kg/m .    Kassandra Schaefer MA       10 minutes spent on the date of the encounter doing chart review, history and exam, documentation and further activities as noted above.   This is a postop visit.    DARYN Daly, NP-C  Colon and Rectal Surgery  [facility]    This note was created using speech recognition software and may contain unintended word substitutions.        "

## 2021-03-19 NOTE — NURSING NOTE
"Chief Complaint   Patient presents with     Post-op Visit     PO DOS 2/26/2021 seton replacement        Vitals:    03/19/21 1138   BP: (!) 138/91   BP Location: Left arm   Patient Position: Sitting   Cuff Size: Adult Regular   Pulse: 91   SpO2: 98%   Weight: 175 lb 3.2 oz   Height: 5' 5\"       Body mass index is 29.15 kg/m .    Kassandra Schaefer MA    "

## 2021-03-19 NOTE — PROGRESS NOTES
"Colon and Rectal Surgery Postoperative Clinic Note    RE: Abiola Matute  : 1965  MICHELLE: 3/19/2021    Abiola Matute is a very pleasant 55 year old female with Crohn's disease with anal fistula who is now status post examination under anesthesia with replacement of seton and excision of anal skin tag on 21 with Dr. Nam.     FINAL DIAGNOSIS:   ANUS, LEFT POSTERIOR SKIN BRIDGE, BIOPSY:   - Hyperkeratotic skin with underlying dermal fibrosis   - Negative for granuloma formation and dysplasia     Interval history: Virginia has been doing well but has some \"needle like\" pain. No bleeding. Some minor leakage of stool after bowel movements. Stools are soft. She is not bothered by the seton. She is doing sitz baths 2-3 times a day.     Physical Examination: Exam was chaperoned by Marilia Guy MA   BP (!) 138/91 (BP Location: Left arm, Patient Position: Sitting, Cuff Size: Adult Regular)   Pulse 91   Ht 5' 5\"   Wt 175 lb 3.2 oz   LMP 2006   SpO2 98%   BMI 29.15 kg/m    General: alert, oriented, in no acute distress, sitting comfortably  HEENT: mucous membranes moist  Perianal external examination:  Small abrasion in the posterior midline. Red seton in place with ties external to the tract.   Digital rectal examination: Was deferred.    Anoscopy: Was deferred.    Assessment/Plan:  55 year old female status post examination under anesthesia with replacement of seton and excision of anal skin tag on 21 with Dr. Nam. She has a small abrasion versus superficially unhealed wound in the posterior midline. This is the site of her pain. Continue with warm tub baths several times a day and use a zinc barrier cream. Will also have her try topical lidocaine. Seton will need to be exchanged yearly and she may be best served with having this done in the OR. Patient's questions were answered to her stated satisfaction and she is in agreement with this plan.    Medical history:  Past Medical History: "   Diagnosis Date     Alcohol abuse      Alcoholic cirrhosis of liver with ascites      Atypical ductal hyperplasia of breast 9/10/10    ERT not recommended -left - and flat epithelial atypia-scheduled for breast biopsy 9/17/2010      Bifid uvula      Cholelithiasis      Contact perianal dermatitis and other eczema     recurrent - clobetasol      Crohn disease      Fear of flying      - gets Ativan prn.      Hypertension      IBS (irritable bowel syndrome)        Surgical history:  Past Surgical History:   Procedure Laterality Date     BIOPSY ANAL N/A 3/28/2019    anal biopsy and culure placement of seton - Dr Fleming     BIOPSY BREAST Left 09/17/2010    - scheduled with Dr. Varma      BIOPSY LIVER  2019     C APPENDECTOMY  at age 14     COLONOSCOPY  2006     COLONOSCOPY N/A 12/23/2014    Procedure: COMBINED COLONOSCOPY, SINGLE OR MULTIPLE BIOPSY/POLYPECTOMY BY BIOPSY;  Surgeon: Diane Fleming MD;  Location:  GI     COLONOSCOPY N/A 12/5/2019    Procedure: COLONOSCOPY, WITH POLYPECTOMY AND BIOPSY;  Surgeon: Farhan Schilling MD;  Location:  GI     ENDOSCOPIC RETROGRADE CHOLANGIOPANCREATOGRAM N/A 4/24/2020    Procedure: ENDOSCOPIC RETROGRADE CHOLANGIOPANCREATOGRAPHY WITH, sledge removal,sphincterotomy, stent in gallbladder and pancreatic duct stent, and balloon dilation;  Surgeon: Guru Isac Kraft MD;  Location:  OR     ESOPHAGOSCOPY, GASTROSCOPY, DUODENOSCOPY (EGD), COMBINED N/A 12/5/2019    Procedure: ESOPHAGOGASTRODUODENOSCOPY (EGD);  Surgeon: Farhan Schilling MD;  Location: [facility]     EXAM UNDER ANESTHESIA ANUS N/A 3/28/2019    Procedure: EXAM UNDER ANESTHESIA ANUS;  Surgeon: Diane Fleming MD;  Location:  OR     EXAM UNDER ANESTHESIA ANUS N/A 2/26/2021    Procedure: EXAM UNDER ANESTHESIA OF ANUS, SETON PLACEMENT, EXCISION OF SKIN BRIDGE;  Surgeon: Avtar Nam MD;  Location: [facility] [place] OR     HYSTERECTOMY, VAGINAL  2006    with Dr. Licha Zhou - with BSO for fibroids       IR GALLBLADDER DRAIN PLACEMENT  4/22/2020     OPEN REDUCTION INTERNAL FIXATION ANKLE Left at age 27    plates and screws removed at age 36     Pelviscopy with removal of bilateral hydrosalpinges.  04/15/2010       Problem list:  Patient Active Problem List    Diagnosis Date Noted     Alcoholic fatty liver 05/14/2014     Priority: High     Acquired hyperbilirubinemia- ? secondary to alcohol use 05/14/2014     Priority: High     Atypical ductal hyperplasia of left breast 10/04/2010     Priority: High     Abscess of anal or rectal region 02/08/2021     Priority: Medium     Added automatically from request for surgery [id number]       Cholecystitis 10/22/2019     Priority: Medium     Alcoholic cirrhosis of liver with ascites (H) - seeing [facility]  10/22/2019     Priority: Medium     Biliary colic 10/21/2019     Priority: Medium     Severe Perianal Crohn's Disease 07/23/2019     Priority: Medium     Stool incontinence 09/30/2014     Priority: Medium     CARDIOVASCULAR SCREENING; LDL GOAL LESS THAN 130 09/30/2014     Priority: Medium     Gastroenteritis 05/20/2014     Priority: Medium     Diffuse nodular cirrhosis of liver (H) 05/20/2014     Priority: Medium     CT abdomen from 2/2019   IMPRESSION:  1. Cirrhotic liver with evidence of portal venous hypertension,  including gastroesophageal varices and probable hemorrhoids. The  spleen size is upper normal. No ascites.  2. Cholelithiasis.       AA (alcohol abuse) - ? ongoing  05/14/2014     Priority: Medium     Per hospital d/c summary 5/2014: Alcoholic cirrhosis of liver with small ascites and ultrasound consistent with liver nodule.  She had elevated LFTs and was higher in 2013.  Her hepatitis C and B antigens are negative and her hepatitis A antigen and antibody also was negative.  The patient had a long history of alcohol use.  She drinks about 4 drinks every other day or every day and the patient was advised alcohol cessation and was explained that her liver was  damaged although her LFTs are decreased from levels of 09/2013 but I think she already developed cirrhosis of the liver and without stopping alcohol she will progress to full blown cirrhosis of the liver with portal hypertension and understand the consequences and patient understood she needs to go to AA and alcohol treatment.  The patient was placed with alcohol withdrawal protocol with Ativan started with vitamins during her admission here.  The patient understood the consequence of continued alcohol use.         Essential hypertension with goal blood pressure less than 140/90 06/18/2013     Priority: Medium     Abnormal liver enzymes- ? related to ongoing etoh use/abuse 10/12/2012     Priority: Medium     S/P total hysterectomy and bilateral salpingo-oophorectomy 09/26/2012     Priority: Medium     Claustrophobia 09/19/2011     Priority: Medium     Postmenopausal- s/p hysterectomy with BSO - not on HRT secondary to atypical ductal hyperplasia & breast pain -no paps needed 08/17/2011     Priority: Medium     Idiopathic thrombocytopenia (H) 03/01/2011     Priority: Medium     Rosacea 08/24/2010     Priority: Medium     Iron deficiency anemia, unspecified iron deficiency anemia type 05/08/2006     Priority: Medium     Problem list name updated by automated process. Provider to review       Intestinal infection due to Clostridium difficile 03/09/2006     Priority: Medium     Other isolated or specific phobias 12/06/2005     Priority: Medium     fear of flying - gets Ativan prn.        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 Provider to review and confirm.       Contact dermatitis and other eczema, due to unspecified cause 12/06/2005     Priority: Medium     perianal  - recurrent - clobetasol        Non morbid obesity due to excess calories 12/23/2003     Priority: Medium     Problem list name updated by automated process. Provider to review          Medications:  Current Outpatient Medications   Medication Sig Dispense Refill     acetaminophen (TYLENOL) 325 MG tablet Take 1-2 tablets (325-650 mg) by mouth every 6 hours as needed for mild pain Take either 325mg every 6 hours or 650mg every 8 hours for pain. Do not exceed 2000mg in 24 hours. 100 tablet 0     furosemide (LASIX) 40 MG tablet Take 1 tablet (40 mg) by mouth daily 90 tablet 3     inFLIXimab (REMICADE) 100 MG injection Inject into the vein once for 1 dose       Milk Thistle-Dand-Fennel-Licor (MILK THISTLE XTRA) CAPS capsule Take 1 capsule by mouth daily       Multiple Vitamins-Minerals (MULTIVITAMIN & MINERAL PO) Take  by mouth daily.       spironolactone (ALDACTONE) 100 MG tablet Take 1 tablet (100 mg) by mouth daily 90 tablet 3       Allergies:  Allergies   Allergen Reactions     Fish Oil      Redness and itching around eye area only - went away when fish oil capsules stopped      Metronidazole      pain/itching     Naphthalenemethylamines      Lamisil = mild urticarial reaction     Ppd [Tuberculin Purified Protein Derivative]      Sulfa Drugs      hives       Family history:  Family History   Problem Relation Age of Onset     Breast Cancer Mother      Gastrointestinal Disease Mother      Heart Disease Father 57     Heart Disease Paternal Grandfather      Hypertension Maternal Grandmother      Diabetes Paternal Grandmother      Diabetes Maternal Grandfather      Cancer Sister        Social history:  Social History     Tobacco Use     Smoking status: Never Smoker     Smokeless tobacco: Never Used   Substance Use Topics     Alcohol use: Not Currently     Alcohol/week: 0.0 standard drinks     Comment: occasional     Marital status: .    Nursing Notes:   Kassandra Schaefer  3/19/2021 11:43 AM  Signed  Chief Complaint   Patient presents with     Post-op Visit     PO DOS 2/26/2021 seton replacement        Vitals:    03/19/21 1138   BP: (!) 138/91   BP Location: Left arm   Patient Position:  "Sitting   Cuff Size: Adult Regular   Pulse: 91   SpO2: 98%   Weight: 175 lb 3.2 oz   Height: 5' 5\"       Body mass index is 29.15 kg/m .    Kassandra Schaefer MA         10 minutes spent on the date of the encounter doing chart review, history and exam, documentation and further activities as noted above.   This is a postop visit.    DARYN Daly, NP-C  Colon and Rectal Surgery  [facility]    This note was created using speech recognition software and may contain unintended word substitutions.    "

## 2021-03-20 ENCOUNTER — HEALTH MAINTENANCE LETTER (OUTPATIENT)
Age: 56
End: 2021-03-20

## 2021-03-23 ENCOUNTER — PATIENT OUTREACH (OUTPATIENT)
Dept: NURSING | Facility: CLINIC | Age: 56
End: 2021-03-23
Payer: COMMERCIAL

## 2021-03-23 ENCOUNTER — VIRTUAL VISIT (OUTPATIENT)
Dept: GASTROENTEROLOGY | Facility: CLINIC | Age: 56
End: 2021-03-23
Payer: COMMERCIAL

## 2021-03-23 ENCOUNTER — TELEPHONE (OUTPATIENT)
Dept: GASTROENTEROLOGY | Facility: CLINIC | Age: 56
End: 2021-03-23

## 2021-03-23 VITALS — WEIGHT: 175 LBS | BODY MASS INDEX: 29.16 KG/M2 | HEIGHT: 65 IN

## 2021-03-23 DIAGNOSIS — K50.113 CROHN'S DISEASE OF LARGE INTESTINE WITH FISTULA (H): Primary | ICD-10-CM

## 2021-03-23 PROCEDURE — 99215 OFFICE O/P EST HI 40 MIN: CPT | Mod: TEL | Performed by: PHYSICIAN ASSISTANT

## 2021-03-23 ASSESSMENT — MIFFLIN-ST. JEOR: SCORE: 1384.67

## 2021-03-23 ASSESSMENT — PAIN SCALES - GENERAL: PAINLEVEL: SEVERE PAIN (7)

## 2021-03-23 NOTE — NURSING NOTE
"Chief Complaint   Patient presents with     RECHECK     Follow up appt.       Vitals:    03/23/21 1314   Weight: 79.4 kg (175 lb)   Height: 1.651 m (5' 5\")       Body mass index is 29.12 kg/m .                            RADHA IRIZARRY, EMT    "

## 2021-03-23 NOTE — PROGRESS NOTES
"Abiola Matute is a 55 year old female who is being evaluated via a billable telephone visit.      The patient has been notified of following:     \"This video visit will be conducted via a call between you and your physician/provider. We have found that certain health care needs can be provided without the need for an in-person physical exam.  This service lets us provide the care you need with a video conversation.  If a prescription is necessary we can send it directly to your pharmacy.  If lab work is needed we can place an order for that and you can then stop by our lab to have the test done at a later time.    Video visits are billed at different rates depending on your insurance coverage.  Please reach out to your insurance provider with any questions.    If during the course of the call the physician/provider feels a video visit is not appropriate, you will not be charged for this service.\"    Patient has given verbal consent for Video visit? Yes  How would you like to obtain your AVS? MyChart  If you are dropped from the video visit, the video invite should be resent to: Text to cell phone: [phone]  Will anyone else be joining your video visit? No        Telephone-Visit Details    Type of service:  Telephone Visit    Video Start Time: 1:20 PM  Video End Time: 1:56 PM     Originating Location (pt. Location): Home    Distant Location (provider location):  [facility] [place]     Platform used for Video Visit: Suma Serrato PA-C    IBD CLINIC VISIT    CC/REFERRING MD:  Referred Self  REASON FOR CONSULTATION: Crohn's.     ASSESSMENT/PLAN:  55 year old female with cirrhosis and Crohn's    1.  Crohn's disease: Perianal fistula continues to be present, seton in place and symptoms of naval + perianal drainage persist despite dose change to every 4 weeks at 10mg/kg.  Encouraging no luminal disease present on most recent flex sig, and it may take 6-12 months before " perianal disease shows improvement on the change of dose. We will continue to closely monitor for now on this dose to allow for adequate time for possible healing. This may also be the best clinical picture we are able to achieve at this dose and she may likely continue to require colorectal assistance with seton placements for perianal disease. Given luminal disease remission and an adequate level of Remicade (>25), it is difficult to determine if pushing the dose even further if it would allow for improvement in perianal disease.   -- Continue infliximab 10mg/kg every 4 weeks. We could consider a repeat MRI pelvis in approx 6 months to monitor interval response to increased dose.   -- Laboratory studies to be completed while on infliximab therapy to include CBC, LFTs, CRP and ESR     2. Cirrhosis: Some notes state cryptogenic, some notes state alcoholic. Recent variceal screening with EGD was unremarkable.  Patient is now established with hepatology with Dr. Cervantes.     IBD HISTORY  Age at diagnosis: 53 (4/2019)  Extent of disease: miguel angel-anal, anal  Disease phenotype: Miguel Angel-anal fistula  Miguel Angel-anal disease: Yes  Current CD medications:  - Infliximab - started May 2019. Dose increase to 10 mg/kg every 8 weeks but had continued active disease, dose increase to 10 mg/kg every 4 weeks 11/2020.  Prior IBD surgeries: No. Seton placements  Prior IBD Medications:    DRUG MONITORING  TPMT enzyme activity:     6-TGN/6-MMPN levels:    Biologic concentration:  10/2/2019: IFX 0.8, anti-IFX antibodies: 451 (Esoterix)  1/22/2020 infliximab level 2.3, no antibodies (this is an 8-week trough at 10 mg/kg)  3/4/21 IFX 25.1, no antibodies (4 week trough on 10mg/kg)    DISEASE ASSESSMENT  Labs  Recent Labs   Lab Test 03/02/21  0910 10/27/20  0925   CRP 4.7 3.4   SED 22 29     Fecal calprotectin: --  Endoscopy: flex sig 3/2021 shows isolated perianal disease  Enterography: --  C diff: --    sIBDQ:   No flowsheet data found.    IBD  Health Care Maintenance:  Vaccinations:  All patients on biologics should avoid live vaccines.    -- Influenza (every year)  -- TdaP (every 10 years)  -- Pneumococcal Pneumonia (once plus booster at 5 years)  -- Yearly assessment for latent Tb (verbal screening and exam, PPD or QuantiFERON-Tb testing)    One time confirmation of immunity or serologies:  -- Hepatitis A (serologies or immunizations)  -- Hepatitis B (serologies or immunizations)  -- Varicella  -- MMR  -- HPV (all aged 18-26)  -- Meningococcal meningitis (all patients at risk for meningitis)  -- Due to the immunosuppression in this patient, I would not advise administration of live vaccines such as varicella/VZV, intranasal influenza, MMR, or yellow fever vaccine (if travelling).      Bone mineral density screening   -- Recommend all patients supplement with calcium and vitamin D  -- Consider DEXA if not already done    Cancer Screening:  Colon cancer screening:  Unclear colonic extent of disease at this time.      Cervical cancer screening: Per OBGYN    Skin cancer screening: Annual visual exam of skin by dermatologist since patient is immunocompromised    Depression Screening:  PHQ-2 Score:     PHQ-2 ( 1999 Pfizer) 1/25/2021 9/4/2020   Q1: Little interest or pleasure in doing things 0 0   Q2: Feeling down, depressed or hopeless 1 0   PHQ-2 Score 1 0   Q1: Little interest or pleasure in doing things - Not at all   Q2: Feeling down, depressed or hopeless - Not at all   PHQ-2 Score - 0     Misc:  -- Avoid tobacco use  -- Avoid NSAIDs as there is potentially a 25% chance of causing an IBD flare    Return to clinic in 3 months    Thank you for this consultation.  It was a pleasure to participate in the care of this patient; please contact us with any further questions.     This note was created with voice recognition software, and while reviewed for accuracy, typos may remain.       Sheldon Serrato PA-C  Division of Gastroenterology, Hepatology and  Nutrition  [facility]       HPI:   Patient for follow up of perianal crohn's disease.  Patient additional has hx of alcoholic cirrhosis and follows with hepatology.      Previous hx includes:  She is a history of long-standing diarrhea as well as recurrent aphthous ulcers.  In April 2019 she was diagnosed with Crohn's disease due to perianal fistula.  She started infliximab due to perianal disease, and per colorectal surgery note had may be a 30% improvement in fistula output, however the patient stated she did not have improvement. Additionally, patient had an episode of cholecystitis - treated medically due to liver numbers.  She also had an ERCP with cystic and pancreatic placement + papillary sphincterotomy on 4/24/20 due to acute on chronic cholecystitis.    Infliximab level had originally been obtained in oct 2019 showing 10/2/2019: IFX 0.8, anti-IFX antibodies: 451 (Esoterix).  Repeat Infliximab level obtained 11/12/19 was 2.3, no antibodies. Dose was optimized to 10mg/kg every 8 weeks. Colonoscopy 12/2019 showed isolated perianal disease, seton was in place.  A new level obtained 1/2020 was also 2.3, no antibodies. No changes at that time.  She did have an improvement with the dose change to 10mg/kg, however she up to 3-4 weeks before her next infusion she will have return of canker sores, increased joint pain and an increase in frequency in her stools.  Dose was ultimately changed to every 4 weeks Nov 2020. Repeat level 25.1, no antibodies (3/4/21).    Flex sig 3/1/21 showed no active crohn's observed, bx with no active inflammation. She had a replacement of seton and excision of anal skin tag on 2/26/21 with Dr. Nam.      Even with the dose change to every 4 weeks, approximately one week before infusion she notes inside her belly button there is a small opening that will weep. It will resolve a day or two after infusion. Additional she also have small opening approx 2-3 inches above rectum  that will weep and eventually a day or two infusion.     Currently having a somewhat formed stool followed by looser stools up to 5 stools per day, urgency is still present. No blood in the stool. Still having occasional incontinence (3-4 times per week).     ROS:    No fevers or chills  No weight loss  No blurry vision, double vision or change in vision  No sore throat  No lymphadenopathy  No headache, paraesthesias, or weakness in a limb  No shortness of breath or wheezing  No chest pain or pressure  No arthralgias or myalgias  No rashes or skin changes  No odynophagia or dysphagia  No BRBPR, hematochezia, melena  No dysuria, frequency or urgency  No hot/cold intolerance or polyria  No anxiety or depression    Extra intestinal manifestations of IBD:  No uveitis/episcleritis  + aphthous ulcers (come and go, not every month)  + arthritis   No erythema nodosum/pyoderma gangrenosum.     PERTINENT PAST MEDICAL HISTORY:  Past Medical History:   Diagnosis Date     Alcohol abuse      Alcoholic cirrhosis of liver with ascites      Atypical ductal hyperplasia of breast 9/10/10    ERT not recommended -left - and flat epithelial atypia-scheduled for breast biopsy 9/17/2010      Bifid uvula      Cholelithiasis      Contact perianal dermatitis and other eczema     recurrent - clobetasol      Crohn disease      Fear of flying      - gets Ativan prn.      Hypertension      IBS (irritable bowel syndrome)        PREVIOUS SURGERIES:  Past Surgical History:   Procedure Laterality Date     BIOPSY ANAL N/A 3/28/2019    anal biopsy and culure placement of seton - Dr Fleming     BIOPSY BREAST Left 09/17/2010    - scheduled with Dr. Varma      BIOPSY LIVER  2019     C APPENDECTOMY  at age 14     COLONOSCOPY  2006     COLONOSCOPY N/A 12/23/2014    Procedure: COMBINED COLONOSCOPY, SINGLE OR MULTIPLE BIOPSY/POLYPECTOMY BY BIOPSY;  Surgeon: Diane Fleming MD;  Location:  GI     COLONOSCOPY N/A 12/5/2019    Procedure: COLONOSCOPY,  WITH POLYPECTOMY AND BIOPSY;  Surgeon: Farhan Schilling MD;  Location: [facility] GI     ENDOSCOPIC RETROGRADE CHOLANGIOPANCREATOGRAM N/A 4/24/2020    Procedure: ENDOSCOPIC RETROGRADE CHOLANGIOPANCREATOGRAPHY WITH, sledge removal,sphincterotomy, stent in gallbladder and pancreatic duct stent, and balloon dilation;  Surgeon: Guru Isac Kraft MD;  Location: [facility] OR     ESOPHAGOSCOPY, GASTROSCOPY, DUODENOSCOPY (EGD), COMBINED N/A 12/5/2019    Procedure: ESOPHAGOGASTRODUODENOSCOPY (EGD);  Surgeon: Farhan Schilling MD;  Location: [facility] GI     EXAM UNDER ANESTHESIA ANUS N/A 3/28/2019    Procedure: EXAM UNDER ANESTHESIA ANUS;  Surgeon: Diane Fleming MD;  Location:  OR     EXAM UNDER ANESTHESIA ANUS N/A 2/26/2021    Procedure: EXAM UNDER ANESTHESIA OF ANUS, SETON PLACEMENT, EXCISION OF SKIN BRIDGE;  Surgeon: Avtar Nam MD;  Location: [facility] OR     HYSTERECTOMY, VAGINAL  2006    with Dr. Licha Zhou - with BSO for fibroids      IR GALLBLADDER DRAIN PLACEMENT  4/22/2020     OPEN REDUCTION INTERNAL FIXATION ANKLE Left at age 27    plates and screws removed at age 36     Pelviscopy with removal of bilateral hydrosalpinges.  04/15/2010       PREVIOUS ENDOSCOPY:  3/7/19: Flex sig: Colon and rectum appeared normal. 2 large deep ulcers extending from the anal cnaal to left perianal area.     10/23/19: Colonoscopy: Endoscopically normal ileum.  Mild pan colonic congestion with contact friability thought to be related to portal hypertension and thrombocytopenia.  Prior anal verge ulcer is healed    12/23/14: Colonoscopy: Perianal skin tag noted.  Ileum normal, colon normal.    ALLERGIES:     Allergies   Allergen Reactions     Fish Oil      Redness and itching around eye area only - went away when fish oil capsules stopped      Metronidazole      pain/itching     Naphthalenemethylamines      Lamisil = mild urticarial reaction     Ppd [Tuberculin Purified Protein Derivative]      Sulfa Drugs      hives        PERTINENT MEDICATIONS:    Current Outpatient Medications:      acetaminophen (TYLENOL) 325 MG tablet, Take 1-2 tablets (325-650 mg) by mouth every 6 hours as needed for mild pain Take either 325mg every 6 hours or 650mg every 8 hours for pain. Do not exceed 2000mg in 24 hours., Disp: 100 tablet, Rfl: 0     furosemide (LASIX) 40 MG tablet, Take 1 tablet (40 mg) by mouth daily, Disp: 90 tablet, Rfl: 3     inFLIXimab (REMICADE) 100 MG injection, Inject into the vein once for 1 dose, Disp:  , Rfl:      Milk Thistle-Dand-Fennel-Licor (MILK THISTLE XTRA) CAPS capsule, Take 1 capsule by mouth daily, Disp: , Rfl:      Multiple Vitamins-Minerals (MULTIVITAMIN & MINERAL PO), Take  by mouth daily., Disp: , Rfl:      spironolactone (ALDACTONE) 100 MG tablet, Take 1 tablet (100 mg) by mouth daily, Disp: 90 tablet, Rfl: 3    SOCIAL HISTORY:  Social History     Socioeconomic History     Marital status:      Spouse name: Albino     Number of children: 3     Years of education: 14     Highest education level: Not on file   Occupational History     Occupation: [employer]     Comment:    Social Needs     Financial resource strain: Not very hard     Food insecurity     Worry: Never true     Inability: Never true     Transportation needs     Medical: No     Non-medical: No   Tobacco Use     Smoking status: Never Smoker     Smokeless tobacco: Never Used   Substance and Sexual Activity     Alcohol use: Not Currently     Alcohol/week: 0.0 standard drinks     Comment: occasional     Drug use: No     Comment: no herbal meds either     Sexual activity: Yes     Partners: Male     Birth control/protection: Surgical     Comment: harper had vasectomy   Lifestyle     Physical activity     Days per week: 0 days     Minutes per session: Not on file     Stress: Rather much   Relationships     Social connections     Talks on phone: More than three times a week     Gets together: More than three times a week      Attends Congregational service: Not on file     Active member of club or organization: Not on file     Attends meetings of clubs or organizations: Not on file     Relationship status: Not on file     Intimate partner violence     Fear of current or ex partner: Not on file     Emotionally abused: Not on file     Physically abused: Not on file     Forced sexual activity: Not on file   Other Topics Concern      Service No     Blood Transfusions No     Caffeine Concern No     Comment: rarely drinks caffeine     Occupational Exposure Not Asked     Hobby Hazards Not Asked     Sleep Concern Not Asked     Stress Concern Not Asked     Weight Concern Not Asked     Special Diet Not Asked     Back Care Not Asked     Exercise Yes     Comment: does a lot of walking - 4x/week     Bike Helmet Not Asked     Seat Belt Yes     Comment: always     Self-Exams Yes     Comment: SBE encouraged monthly     Parent/sibling w/ CABG, MI or angioplasty before 65F 55M? Yes   Social History Narrative    calcium - drinks 5-6 large glasses skim milk/day    flex sig/colonoscopy -at age 49    sun precautions - discussed    mammogram - needs every 2 years in her 30's, then yearly from then on    Td booster - 9/99 and 4/27/2010    pneumovax -at age 59    DEXA -when perimenopausal    stool hemoccults - every year after age 40    ASA- start at age 40    mulvitamin - encouraged       FAMILY HISTORY:  Family History   Problem Relation Age of Onset     Breast Cancer Mother      Gastrointestinal Disease Mother      Heart Disease Father 57     Heart Disease Paternal Grandfather      Hypertension Maternal Grandmother      Diabetes Paternal Grandmother      Diabetes Maternal Grandfather      Cancer Sister        Past/family/social history reviewed and no changes    PHYSICAL EXAMINATION:  Constitutional: aaox3, cooperative, pleasant, not dyspneic/diaphoretic, no acute distress   LMP 05/31/2006   Wt:   Wt Readings from Last 2 Encounters:   03/19/21 79.5 kg (175  lb 3.2 oz)   02/26/21 80.7 kg (178 lb)      Exam deferred as this was a telephone encounter due to the coronavirus outbreak.    PERTINENT STUDIES:  Most recent CBC:  Recent Labs   Lab Test 03/02/21  0910 02/23/21  1118   WBC 3.3* 3.3*   HGB 13.9 15.2   HCT 43.2 44.6   PLT 80* 98*     Most recent hepatic panel:  Recent Labs   Lab Test 03/02/21  0910 10/27/20  0925   ALT 39 40   AST 49* 54*     Most recent creatinine:  Recent Labs   Lab Test 02/23/21  1118 09/04/20  1413   CR 0.71 0.73

## 2021-03-23 NOTE — PROGRESS NOTES
Virginia is a 55 year old who is being evaluated via a billable telephone visit.      What phone number would you like to be contacted at? [phone]  How would you like to obtain your AVS? Neri  Phone call duration: 36 minutes

## 2021-03-23 NOTE — CONFIDENTIAL NOTE
Contacted patient to let her know that her appointment is a virtual visit.   Left a message for patient and also my chart.

## 2021-03-23 NOTE — PROGRESS NOTES
Clinic Care Coordination Contact  Community Health Worker Follow Up  Spoke to Virginia    Goals:   Goals Addressed as of 3/23/2021 at 12:02 PM                 Today    2/15/21      1. Psychosocial (pt-stated)   30%  20%    Added 1/13/21 by Cathryn Goss, [facility]     Goal Statement: I want to apply for Social Security Disability within 6 months.   Date Goal set: 1.13.2021  Barriers: Navigating application/paperwork.  Strengths: Recognition of need, willingness to collaborate with Disability Specialists.   Date to Achieve By: 7.13.2021  Patient expressed understanding of goal: Pt reports understanding and denies any additional questions or concerns at this times. SENTHIL HOWE engaged in AIDET communication during encounter.    Action steps to achieve this goal:  1. I will review the letter sent by SENTHIL HOWE.   2. I will contact a Disability advocacy agency.   3. I will work with a disability advocacy agency to apply for SSDI.      03/23, CHW:    Virginia reports that she was not satisfied with working the Disability Specialists.    But has found another law firm to represent her case. [employer].    She will be meeting with them on 03/30 and will know more about it afterwards.        Intervention and Education during outreach:   CHW inquired if there was any additional support or resources needed at this time.  However, patient had no other needs or concerns.  Patient understands that she can contact CC if anything else is needed.    CHW Plan: CHW will continue monthly outreaches to monitor progression of goals.    REY Hensley  Clinic Care Coordination  [facility] : [place], [place], and [place]  Phone: [phone]    ______________________  Next Outreach:  04/22/21  Planned Outreach Frequency: Monthly  Preferred Phone Number: [phone]    Enrollment Date:  01/13/21  Last Care Plan Assessment:  01/13/21

## 2021-03-23 NOTE — PATIENT INSTRUCTIONS
It was a pleasure taking care of you today.  I've included a brief summary of our discussion and care plan from today's visit below.  Please review this information with your primary care provider.  ______________________________________________________________________    My recommendations are summarized as follows:    -- Continue remicade every 4 weeks   -- Labs with your infusions  -- Patient with IBD we recommend supplementation vitamin D 1000 units daily and calcium 500 mg twice daily.  -- Vaccines/immunizations to be updated: Recommend yearly flu shot, pneumonia vaccines (Prevnar 13 then 8 weeks later Pneumovax 23 then 5 years later Pneumovax 23), tetanus every 10 years.    We are conducting a study that you might be interested in. We are looking at the bodies response to the COVID vaccine in people on biologics. The study involves getting blood drawn now (before the COVID vaccine) and then a few blood draws after the COVID vaccine.       The study does not give you the COVID vaccine. Rather we just get blood after you get it.     If this is something you might be interested in, let me know and I can have a  reach out to you. Or you can email our study team directly to sign up ([email or web address])     If you have any questions, please don't hesitate to contact the Gastroenterology nurse at [phone].     -- Yearly Dermatology visit for skin check while on immunosuppressive therapy. Can call [phone] to schedule.  -- Yearly pap smear while on immunosuppressive therapy  -- No NSAIDs (ibuprofen, or anything containing ibuprofen)     For additional resources about inflammatory bowel disease visit [email or web address]    Return to GI Clinic in 3 months to review your progress.    ______________________________________________________________________    Who do I call with any questions after my visit?  Please be in touch if there are any further questions that arise  following today's visit.  There are multiple ways to contact your gastroenterology care team.        During business hours, you may reach a Gastroenterology nurse at [phone], option 3.       To schedule or reschedule an appointment, please call [phone].       You can always send a secure message through As[employer].  Asuum messages are answered by your nurse or doctor typically within 24 hours.  Please allow extra time on weekends and holidays.        For urgent/emergent questions after business hours, you may reach the on-call GI Fellow by contacting the [facility]  at [phone].      In order for your refill to be processed in a timely fashion, it is your responsibility to ensure you follow the recommendations from your provider regarding your laboratory studies and follow up appointments.       How will I get the results of any tests ordered?    You will receive all of your results.  If you have signed up for LIQUITY[employer], any tests ordered at your visit will be available to you after your physician reviews them.  Typically this takes 1-2 weeks.  If there are urgent results that require a change in your care plan, your physician or nurse will call you to discuss the next steps.      What is Asuum?  Asuum is a secure way for you to access all of your healthcare records from the [facility].  It is a web based computer program, so you can sign on to it from any location.  It also allows you to send secure messages to your care team.  I recommend signing up for Asuum access if you have not already done so and are comfortable with using a computer.      How to I schedule a follow-up visit?  If you did not schedule a follow-up visit today, please call [phone] to schedule a follow-up office visit.        Sincerely,    Sheldon Serrato PA-C  [facility]  Division of Gastroenterology

## 2021-03-23 NOTE — LETTER
"    3/23/2021         RE: Abiola Matute  [address] [facility] [address]        Dear Colleague,    Thank you for referring your patient, Abiola Matute, to the [facility] [place]. Please see a copy of my visit note below.    Abiola Matute is a 55 year old female who is being evaluated via a billable telephone visit.      The patient has been notified of following:     \"This video visit will be conducted via a call between you and your physician/provider. We have found that certain health care needs can be provided without the need for an in-person physical exam.  This service lets us provide the care you need with a video conversation.  If a prescription is necessary we can send it directly to your pharmacy.  If lab work is needed we can place an order for that and you can then stop by our lab to have the test done at a later time.    Video visits are billed at different rates depending on your insurance coverage.  Please reach out to your insurance provider with any questions.    If during the course of the call the physician/provider feels a video visit is not appropriate, you will not be charged for this service.\"    Patient has given verbal consent for Video visit? Yes  How would you like to obtain your AVS? MyChart  If you are dropped from the video visit, the video invite should be resent to: Text to cell phone: [phone]  Will anyone else be joining your video visit? No        Telephone-Visit Details    Type of service:  Telephone Visit    Video Start Time: 1:20 PM  Video End Time: 1:56 PM     Originating Location (pt. Location): Home    Distant Location (provider location):  [facility] [place]     Platform used for Video Visit: Suma Serrato PA-C    IBD CLINIC VISIT    CC/REFERRING MD:  Referred Self  REASON FOR CONSULTATION: Crohn's.     ASSESSMENT/PLAN:  55 year old female with cirrhosis and " Crohn's    1.  Crohn's disease: Perianal fistula continues to be present, seton in place and symptoms of naval + perianal drainage persist despite dose change to every 4 weeks at 10mg/kg.  Encouraging no luminal disease present on most recent flex sig, and it may take 6-12 months before perianal disease shows improvement on the change of dose. We will continue to closely monitor for now on this dose to allow for adequate time for possible healing. This may also be the best clinical picture we are able to achieve at this dose and she may likely continue to require colorectal assistance with seton placements for perianal disease. Given luminal disease remission and an adequate level of Remicade (>25), it is difficult to determine if pushing the dose even further if it would allow for improvement in perianal disease.   -- Continue infliximab 10mg/kg every 4 weeks. We could consider a repeat MRI pelvis in approx 6 months to monitor interval response to increased dose.   -- Laboratory studies to be completed while on infliximab therapy to include CBC, LFTs, CRP and ESR     2. Cirrhosis: Some notes state cryptogenic, some notes state alcoholic. Recent variceal screening with EGD was unremarkable.  Patient is now established with hepatology with Dr. Cervantes.     IBD HISTORY  Age at diagnosis: 53 (4/2019)  Extent of disease: miguel angel-anal, anal  Disease phenotype: Miguel Angel-anal fistula  Miguel Angel-anal disease: Yes  Current CD medications:  - Infliximab - started May 2019. Dose increase to 10 mg/kg every 8 weeks but had continued active disease, dose increase to 10 mg/kg every 4 weeks 11/2020.  Prior IBD surgeries: No. Seton placements  Prior IBD Medications:    DRUG MONITORING  TPMT enzyme activity:     6-TGN/6-MMPN levels:    Biologic concentration:  10/2/2019: IFX 0.8, anti-IFX antibodies: 451 (Esoterix)  1/22/2020 infliximab level 2.3, no antibodies (this is an 8-week trough at 10 mg/kg)  3/4/21 IFX 25.1, no antibodies (4 week  trough on 10mg/kg)    DISEASE ASSESSMENT  Labs  Recent Labs   Lab Test 03/02/21  0910 10/27/20  0925   CRP 4.7 3.4   SED 22 29     Fecal calprotectin: --  Endoscopy: flex sig 3/2021 shows isolated perianal disease  Enterography: --  C diff: --    sIBDQ:   No flowsheet data found.    IBD Health Care Maintenance:  Vaccinations:  All patients on biologics should avoid live vaccines.    -- Influenza (every year)  -- TdaP (every 10 years)  -- Pneumococcal Pneumonia (once plus booster at 5 years)  -- Yearly assessment for latent Tb (verbal screening and exam, PPD or QuantiFERON-Tb testing)    One time confirmation of immunity or serologies:  -- Hepatitis A (serologies or immunizations)  -- Hepatitis B (serologies or immunizations)  -- Varicella  -- MMR  -- HPV (all aged 18-26)  -- Meningococcal meningitis (all patients at risk for meningitis)  -- Due to the immunosuppression in this patient, I would not advise administration of live vaccines such as varicella/VZV, intranasal influenza, MMR, or yellow fever vaccine (if travelling).      Bone mineral density screening   -- Recommend all patients supplement with calcium and vitamin D  -- Consider DEXA if not already done    Cancer Screening:  Colon cancer screening:  Unclear colonic extent of disease at this time.      Cervical cancer screening: Per OBGYN    Skin cancer screening: Annual visual exam of skin by dermatologist since patient is immunocompromised    Depression Screening:  PHQ-2 Score:     PHQ-2 ( 1999 Pfizer) 1/25/2021 9/4/2020   Q1: Little interest or pleasure in doing things 0 0   Q2: Feeling down, depressed or hopeless 1 0   PHQ-2 Score 1 0   Q1: Little interest or pleasure in doing things - Not at all   Q2: Feeling down, depressed or hopeless - Not at all   PHQ-2 Score - 0     Misc:  -- Avoid tobacco use  -- Avoid NSAIDs as there is potentially a 25% chance of causing an IBD flare    Return to clinic in 3 months    Thank you for this consultation.  It was a  pleasure to participate in the care of this patient; please contact us with any further questions.     This note was created with voice recognition software, and while reviewed for accuracy, typos may remain.     Sheldon Serrato PA-C  Division of Gastroenterology, Hepatology and Nutrition  [facility]       HPI:   Patient for follow up of perianal crohn's disease.  Patient additional has hx of alcoholic cirrhosis and follows with hepatology.      Previous hx includes:  She is a history of long-standing diarrhea as well as recurrent aphthous ulcers.  In April 2019 she was diagnosed with Crohn's disease due to perianal fistula.  She started infliximab due to perianal disease, and per colorectal surgery note had may be a 30% improvement in fistula output, however the patient stated she did not have improvement. Additionally, patient had an episode of cholecystitis - treated medically due to liver numbers.  She also had an ERCP with cystic and pancreatic placement + papillary sphincterotomy on 4/24/20 due to acute on chronic cholecystitis.    Infliximab level had originally been obtained in oct 2019 showing 10/2/2019: IFX 0.8, anti-IFX antibodies: 451 (Esoterix).  Repeat Infliximab level obtained 11/12/19 was 2.3, no antibodies. Dose was optimized to 10mg/kg every 8 weeks. Colonoscopy 12/2019 showed isolated perianal disease, seton was in place.  A new level obtained 1/2020 was also 2.3, no antibodies. No changes at that time.  She did have an improvement with the dose change to 10mg/kg, however she up to 3-4 weeks before her next infusion she will have return of canker sores, increased joint pain and an increase in frequency in her stools.  Dose was ultimately changed to every 4 weeks Nov 2020. Repeat level 25.1, no antibodies (3/4/21).    Flex sig 3/1/21 showed no active crohn's observed, bx with no active inflammation. She had a replacement of seton and excision of anal skin tag on 2/26/21 with   Cyril.      Even with the dose change to every 4 weeks, approximately one week before infusion she notes inside her belly button there is a small opening that will weep. It will resolve a day or two after infusion. Additional she also have small opening approx 2-3 inches above rectum that will weep and eventually a day or two infusion.     Currently having a somewhat formed stool followed by looser stools up to 5 stools per day, urgency is still present. No blood in the stool. Still having occasional incontinence (3-4 times per week).     ROS:    No fevers or chills  No weight loss  No blurry vision, double vision or change in vision  No sore throat  No lymphadenopathy  No headache, paraesthesias, or weakness in a limb  No shortness of breath or wheezing  No chest pain or pressure  No arthralgias or myalgias  No rashes or skin changes  No odynophagia or dysphagia  No BRBPR, hematochezia, melena  No dysuria, frequency or urgency  No hot/cold intolerance or polyria  No anxiety or depression    Extra intestinal manifestations of IBD:  No uveitis/episcleritis  + aphthous ulcers (come and go, not every month)  + arthritis   No erythema nodosum/pyoderma gangrenosum.     PERTINENT PAST MEDICAL HISTORY:  Past Medical History:   Diagnosis Date     Alcohol abuse      Alcoholic cirrhosis of liver with ascites      Atypical ductal hyperplasia of breast 9/10/10    ERT not recommended -left - and flat epithelial atypia-scheduled for breast biopsy 9/17/2010      Bifid uvula      Cholelithiasis      Contact perianal dermatitis and other eczema     recurrent - clobetasol      Crohn disease      Fear of flying      - gets Ativan prn.      Hypertension      IBS (irritable bowel syndrome)        PREVIOUS SURGERIES:  Past Surgical History:   Procedure Laterality Date     BIOPSY ANAL N/A 3/28/2019    anal biopsy and culure placement of seton - Dr Fleming     BIOPSY BREAST Left 09/17/2010    - scheduled with Dr. Varma      BIOPSY  LIVER  2019     C APPENDECTOMY  at age 14     COLONOSCOPY  2006     COLONOSCOPY N/A 12/23/2014    Procedure: COMBINED COLONOSCOPY, SINGLE OR MULTIPLE BIOPSY/POLYPECTOMY BY BIOPSY;  Surgeon: Diane Fleming MD;  Location:  GI     COLONOSCOPY N/A 12/5/2019    Procedure: COLONOSCOPY, WITH POLYPECTOMY AND BIOPSY;  Surgeon: Farhan Schilling MD;  Location: [facility] GI     ENDOSCOPIC RETROGRADE CHOLANGIOPANCREATOGRAM N/A 4/24/2020    Procedure: ENDOSCOPIC RETROGRADE CHOLANGIOPANCREATOGRAPHY WITH, sledge removal,sphincterotomy, stent in gallbladder and pancreatic duct stent, and balloon dilation;  Surgeon: Guru Isac Kraft MD;  Location: [facility] OR     ESOPHAGOSCOPY, GASTROSCOPY, DUODENOSCOPY (EGD), COMBINED N/A 12/5/2019    Procedure: ESOPHAGOGASTRODUODENOSCOPY (EGD);  Surgeon: Farhan Schilling MD;  Location: [facility] GI     EXAM UNDER ANESTHESIA ANUS N/A 3/28/2019    Procedure: EXAM UNDER ANESTHESIA ANUS;  Surgeon: Diane Fleming MD;  Location:  OR     EXAM UNDER ANESTHESIA ANUS N/A 2/26/2021    Procedure: EXAM UNDER ANESTHESIA OF ANUS, SETON PLACEMENT, EXCISION OF SKIN BRIDGE;  Surgeon: Avtar Nam MD;  Location: [facility] OR     HYSTERECTOMY, VAGINAL  2006    with Dr. Licha Zhou - with BSO for fibroids      IR GALLBLADDER DRAIN PLACEMENT  4/22/2020     OPEN REDUCTION INTERNAL FIXATION ANKLE Left at age 27    plates and screws removed at age 36     Pelviscopy with removal of bilateral hydrosalpinges.  04/15/2010       PREVIOUS ENDOSCOPY:  3/7/19: Flex sig: Colon and rectum appeared normal. 2 large deep ulcers extending from the anal cnaal to left perianal area.     10/23/19: Colonoscopy: Endoscopically normal ileum.  Mild pan colonic congestion with contact friability thought to be related to portal hypertension and thrombocytopenia.  Prior anal verge ulcer is healed    12/23/14: Colonoscopy: Perianal skin tag noted.  Ileum normal, colon normal.    ALLERGIES:     Allergies   Allergen Reactions      Fish Oil      Redness and itching around eye area only - went away when fish oil capsules stopped      Metronidazole      pain/itching     Naphthalenemethylamines      Lamisil = mild urticarial reaction     Ppd [Tuberculin Purified Protein Derivative]      Sulfa Drugs      hives       PERTINENT MEDICATIONS:    Current Outpatient Medications:      acetaminophen (TYLENOL) 325 MG tablet, Take 1-2 tablets (325-650 mg) by mouth every 6 hours as needed for mild pain Take either 325mg every 6 hours or 650mg every 8 hours for pain. Do not exceed 2000mg in 24 hours., Disp: 100 tablet, Rfl: 0     furosemide (LASIX) 40 MG tablet, Take 1 tablet (40 mg) by mouth daily, Disp: 90 tablet, Rfl: 3     inFLIXimab (REMICADE) 100 MG injection, Inject into the vein once for 1 dose, Disp:  , Rfl:      Milk Thistle-Dand-Fennel-Licor (MILK THISTLE XTRA) CAPS capsule, Take 1 capsule by mouth daily, Disp: , Rfl:      Multiple Vitamins-Minerals (MULTIVITAMIN & MINERAL PO), Take  by mouth daily., Disp: , Rfl:      spironolactone (ALDACTONE) 100 MG tablet, Take 1 tablet (100 mg) by mouth daily, Disp: 90 tablet, Rfl: 3    SOCIAL HISTORY:  Social History     Socioeconomic History     Marital status:      Spouse name: Albino     Number of children: 3     Years of education: 14     Highest education level: Not on file   Occupational History     Occupation: [employer]     Comment:    Social Needs     Financial resource strain: Not very hard     Food insecurity     Worry: Never true     Inability: Never true     Transportation needs     Medical: No     Non-medical: No   Tobacco Use     Smoking status: Never Smoker     Smokeless tobacco: Never Used   Substance and Sexual Activity     Alcohol use: Not Currently     Alcohol/week: 0.0 standard drinks     Comment: occasional     Drug use: No     Comment: no herbal meds either     Sexual activity: Yes     Partners: Male     Birth control/protection: Surgical      Comment: harper had vasectomy   Lifestyle     Physical activity     Days per week: 0 days     Minutes per session: Not on file     Stress: Rather much   Relationships     Social connections     Talks on phone: More than three times a week     Gets together: More than three times a week     Attends Episcopal service: Not on file     Active member of club or organization: Not on file     Attends meetings of clubs or organizations: Not on file     Relationship status: Not on file     Intimate partner violence     Fear of current or ex partner: Not on file     Emotionally abused: Not on file     Physically abused: Not on file     Forced sexual activity: Not on file   Other Topics Concern      Service No     Blood Transfusions No     Caffeine Concern No     Comment: rarely drinks caffeine     Occupational Exposure Not Asked     Hobby Hazards Not Asked     Sleep Concern Not Asked     Stress Concern Not Asked     Weight Concern Not Asked     Special Diet Not Asked     Back Care Not Asked     Exercise Yes     Comment: does a lot of walking - 4x/week     Bike Helmet Not Asked     Seat Belt Yes     Comment: always     Self-Exams Yes     Comment: SBE encouraged monthly     Parent/sibling w/ CABG, MI or angioplasty before 65F 55M? Yes   Social History Narrative    calcium - drinks 5-6 large glasses skim milk/day    flex sig/colonoscopy -at age 49    sun precautions - discussed    mammogram - needs every 2 years in her 30's, then yearly from then on    Td booster - 9/99 and 4/27/2010    pneumovax -at age 59    DEXA -when perimenopausal    stool hemoccults - every year after age 40    ASA- start at age 40    mulvitamin - encouraged       FAMILY HISTORY:  Family History   Problem Relation Age of Onset     Breast Cancer Mother      Gastrointestinal Disease Mother      Heart Disease Father 57     Heart Disease Paternal Grandfather      Hypertension Maternal Grandmother      Diabetes Paternal Grandmother      Diabetes Maternal  Grandfather      Cancer Sister        Past/family/social history reviewed and no changes    PHYSICAL EXAMINATION:  Constitutional: aaox3, cooperative, pleasant, not dyspneic/diaphoretic, no acute distress   LMP 05/31/2006   Wt:   Wt Readings from Last 2 Encounters:   03/19/21 79.5 kg (175 lb 3.2 oz)   02/26/21 80.7 kg (178 lb)      Exam deferred as this was a telephone encounter due to the coronavirus outbreak.    PERTINENT STUDIES:  Most recent CBC:  Recent Labs   Lab Test 03/02/21  0910 02/23/21  1118   WBC 3.3* 3.3*   HGB 13.9 15.2   HCT 43.2 44.6   PLT 80* 98*     Most recent hepatic panel:  Recent Labs   Lab Test 03/02/21  0910 10/27/20  0925   ALT 39 40   AST 49* 54*     Most recent creatinine:  Recent Labs   Lab Test 02/23/21  1118 09/04/20  1413   CR 0.71 0.73       Virginia is a 55 year old who is being evaluated via a billable telephone visit.      What phone number would you like to be contacted at? [phone]  How would you like to obtain your AVS? MyChart  Phone call duration: 36 minutes    Again, thank you for allowing me to participate in the care of your patient.      Sincerely,    Sheldon Serrato PA-C

## 2021-03-31 ENCOUNTER — TRANSFERRED RECORDS (OUTPATIENT)
Dept: HEALTH INFORMATION MANAGEMENT | Facility: CLINIC | Age: 56
End: 2021-03-31

## 2021-03-31 ENCOUNTER — TELEPHONE (OUTPATIENT)
Dept: FAMILY MEDICINE | Facility: CLINIC | Age: 56
End: 2021-03-31

## 2021-03-31 NOTE — TELEPHONE ENCOUNTER
Reason for Call:  Form, our goal is to have forms completed with 72 hours, however, some forms may require a visit or additional information.    Type of letter, form or note:  medical    Who is the form from?: "[employer] (if other please explain)    Where did the form come from: form was faxed in    What clinic location was the form placed at?: [place]    Where the form was placed: Linda Macdonald Box/Folder    What number is listed as a contact on the form?: [phone]       Additional comments:     Call taken on 3/31/2021 at 4:07 PM by Pretty Perea

## 2021-04-05 ENCOUNTER — AMBULATORY - HEALTHEAST (OUTPATIENT)
Dept: NURSING | Facility: CLINIC | Age: 56
End: 2021-04-05

## 2021-04-06 ENCOUNTER — PATIENT OUTREACH (OUTPATIENT)
Dept: CARE COORDINATION | Facility: CLINIC | Age: 56
End: 2021-04-06

## 2021-04-06 NOTE — PROGRESS NOTES
Care Coordination Clinician Chart Review  Situation: Patient chart reviewed by care coordinator.       Background: Care Coordination initial assessment and enrollment to Care Coordination was 9.11.2020.   Patient centered goals were developed with participation from patient.  SENTHIL HOWE handed patient off to CHW for continued outreach every 30 days.        Assessment: Per chart review, patient outreach completed by CC CHW on 3.23.2021.  Patient is actively working to accomplish goal.  Patient's goal remains appropriate and relevant at this time.   Patient is not due for updated Complex Care Plan.  Annual assessment will be due 2/2022.      Goals        Patient Stated      1. Psychosocial (pt-stated)      Goal Statement: I want to apply for Social Security Disability within 6 months.   Date Goal set: 1.13.2021  Barriers: Navigating application/paperwork.  Strengths: Recognition of need, willingness to collaborate with Disability Specialists.   Date to Achieve By: 7.13.2021  Patient expressed understanding of goal: Pt reports understanding and denies any additional questions or concerns at this times. SENTHIL HOWE engaged in AIDET communication during encounter.    Action steps to achieve this goal:  1. I will review the letter sent by SENTHIL HOWE.   2. I will contact a Disability advocacy agency.   3. I will work with a disability advocacy agency to apply for SSDI.                  Plan/Recommendations: The patient will continue working with Care Coordination to achieve goal as above.  CHW will involve SENTHIL HOWE as needed or if patient is ready to move to maintenance.  SENTHIL HOWE will continue to monitor progress to goals and CHW outreaches every 6 weeks.   Care Plan updated and mailed to patient: MARKUS Higgins  [facility] Care Coordinator  Ph. [phone]  [email or web address]

## 2021-04-07 NOTE — TELEPHONE ENCOUNTER
Left non-detailed message for patient to call back.  Please schedule in clinic appt when  patient calls back.  (see previous notes for details)    Thanks Maria C

## 2021-04-21 ENCOUNTER — AMBULATORY - HEALTHEAST (OUTPATIENT)
Dept: NURSING | Facility: CLINIC | Age: 56
End: 2021-04-21

## 2021-04-21 ENCOUNTER — PATIENT OUTREACH (OUTPATIENT)
Dept: CARE COORDINATION | Facility: CLINIC | Age: 56
End: 2021-04-21

## 2021-04-21 NOTE — PROGRESS NOTES
Clinic Care Coordination Contact  [facility]/Voicemail       Clinical Data: Care Coordinator Outreach  Outreach attempted x 1.  Left message on patient's voicemail with call back information and requested return call.    Plan:  Care Coordinator will try to reach patient again in 15 business days.    REY Hensley  Clinic Care Coordination  [facility] Clin[facility] : [place], [place], and [place]  Phone: [phone]    ______________________  Next Outreach:  05/10/21  Planned Outreach Frequency: Monthly  Preferred Phone Number: [phone]    Enrollment Date:  01/13/21  Last Care Plan Assessment:  01/13/21

## 2021-04-27 ENCOUNTER — OFFICE VISIT (OUTPATIENT)
Dept: FAMILY MEDICINE | Facility: CLINIC | Age: 56
End: 2021-04-27
Payer: COMMERCIAL

## 2021-04-27 VITALS
OXYGEN SATURATION: 98 % | WEIGHT: 177 LBS | HEIGHT: 65 IN | HEART RATE: 91 BPM | SYSTOLIC BLOOD PRESSURE: 118 MMHG | TEMPERATURE: 97.6 F | DIASTOLIC BLOOD PRESSURE: 76 MMHG | BODY MASS INDEX: 29.49 KG/M2

## 2021-04-27 DIAGNOSIS — K50.113 CROHN'S DISEASE OF LARGE INTESTINE WITH FISTULA (H): ICD-10-CM

## 2021-04-27 DIAGNOSIS — K50.119 CROHN'S DISEASE OF PERIANAL REGION WITH COMPLICATION (H): Primary | ICD-10-CM

## 2021-04-27 DIAGNOSIS — Z02.89 ENCOUNTER FOR COMPLETION OF FORM WITH PATIENT: ICD-10-CM

## 2021-04-27 LAB
BASOPHILS # BLD AUTO: 0 10E9/L (ref 0–0.2)
BASOPHILS NFR BLD AUTO: 0.4 %
DIFFERENTIAL METHOD BLD: ABNORMAL
EOSINOPHIL # BLD AUTO: 0.5 10E9/L (ref 0–0.7)
EOSINOPHIL NFR BLD AUTO: 9 %
ERYTHROCYTE [DISTWIDTH] IN BLOOD BY AUTOMATED COUNT: 14.3 % (ref 10–15)
ERYTHROCYTE [SEDIMENTATION RATE] IN BLOOD BY WESTERGREN METHOD: 32 MM/H (ref 0–30)
HCT VFR BLD AUTO: 43.8 % (ref 35–47)
HGB BLD-MCNC: 15.3 G/DL (ref 11.7–15.7)
LYMPHOCYTES # BLD AUTO: 1.7 10E9/L (ref 0.8–5.3)
LYMPHOCYTES NFR BLD AUTO: 33.7 %
MCH RBC QN AUTO: 35.6 PG (ref 26.5–33)
MCHC RBC AUTO-ENTMCNC: 34.9 G/DL (ref 31.5–36.5)
MCV RBC AUTO: 102 FL (ref 78–100)
MONOCYTES # BLD AUTO: 0.4 10E9/L (ref 0–1.3)
MONOCYTES NFR BLD AUTO: 8.4 %
NEUTROPHILS # BLD AUTO: 2.5 10E9/L (ref 1.6–8.3)
NEUTROPHILS NFR BLD AUTO: 48.5 %
PLATELET # BLD AUTO: 119 10E9/L (ref 150–450)
RBC # BLD AUTO: 4.3 10E12/L (ref 3.8–5.2)
WBC # BLD AUTO: 5.1 10E9/L (ref 4–11)

## 2021-04-27 PROCEDURE — 36415 COLL VENOUS BLD VENIPUNCTURE: CPT | Performed by: INTERNAL MEDICINE

## 2021-04-27 PROCEDURE — 80076 HEPATIC FUNCTION PANEL: CPT | Performed by: INTERNAL MEDICINE

## 2021-04-27 PROCEDURE — 85652 RBC SED RATE AUTOMATED: CPT | Performed by: INTERNAL MEDICINE

## 2021-04-27 PROCEDURE — 85025 COMPLETE CBC W/AUTO DIFF WBC: CPT | Performed by: INTERNAL MEDICINE

## 2021-04-27 PROCEDURE — 99207 PR NO BILLABLE SERVICE THIS VISIT: CPT | Performed by: NURSE PRACTITIONER

## 2021-04-27 PROCEDURE — 86140 C-REACTIVE PROTEIN: CPT | Performed by: INTERNAL MEDICINE

## 2021-04-27 ASSESSMENT — MIFFLIN-ST. JEOR: SCORE: 1393.75

## 2021-04-27 NOTE — TELEPHONE ENCOUNTER
Form completed during clinic visit and placed in [facility].      Please fax form.      - JMeixl, CNP

## 2021-04-27 NOTE — PROGRESS NOTES
"    Assessment & Plan     Abiola was seen today for forms.    Diagnoses and all orders for this visit:      Encounter for completion of form with patient  Severe Perianal Crohn's Disease  Crohn's disease of large intestine with fistula (H)    Medical source statement form reviewed and completed with patient during visit.    Form to be faxed by .        BMI:   Estimated body mass index is 29.45 kg/m  as calculated from the following:    Height as of this encounter: 1.651 m (5' 5\").    Weight as of this encounter: 80.3 kg (177 lb).     Return in about 5 months (around 9/27/2021) for Preventative Visit.    Linda Macdonald, DARYN CNP  [facility] AURELIA Coleman is a 55 year old who presents for the following health issues:    HPI     Forms-    Disability Forms.         Review of Systems   Constitutional, HEENT, cardiovascular, pulmonary, gi and gu systems are negative, except as otherwise noted.      Objective    /76   Pulse 91   Temp 97.6  F (36.4  C)   Ht 1.651 m (5' 5\")   Wt 80.3 kg (177 lb)   LMP 05/31/2006   SpO2 98%   BMI 29.45 kg/m    Body mass index is 29.45 kg/m .  Physical Exam     GENERAL: healthy, alert and no distress  PSYCH: mentation appears normal, affect normal/bright            "

## 2021-04-28 LAB
ALBUMIN SERPL-MCNC: 3.8 G/DL (ref 3.4–5)
ALP SERPL-CCNC: 127 U/L (ref 40–150)
ALT SERPL W P-5'-P-CCNC: 55 U/L (ref 0–50)
AST SERPL W P-5'-P-CCNC: 72 U/L (ref 0–45)
BILIRUB DIRECT SERPL-MCNC: 0.7 MG/DL (ref 0–0.2)
BILIRUB SERPL-MCNC: 1.9 MG/DL (ref 0.2–1.3)
CRP SERPL-MCNC: 7.2 MG/L (ref 0–8)
PROT SERPL-MCNC: 8.8 G/DL (ref 6.8–8.8)

## 2021-04-28 NOTE — TELEPHONE ENCOUNTER
Completed forms faxed back to [email or web address]  at [phone].   Originals sent to be scanned.       Maria C Polo

## 2021-05-17 ENCOUNTER — PATIENT OUTREACH (OUTPATIENT)
Dept: NURSING | Facility: CLINIC | Age: 56
End: 2021-05-17
Payer: COMMERCIAL

## 2021-05-17 NOTE — PROGRESS NOTES
Clinic Care Coordination Contact  Community Health Worker Follow Up  Spoke to Virginia    Goals:   Goals Addressed as of 5/17/2021 at 11:23 AM                 Today    3/23/21      1. Psychosocial (pt-stated)   40%  30%    Added 1/13/21 by Cathryn Goss, SENTHIL     Goal Statement: I want to apply for Social Security Disability within 6 months.   Date Goal set: 1.13.2021  Barriers: Navigating application/paperwork.  Strengths: Recognition of need, willingness to collaborate with Disability Specialists.   Date to Achieve By: 7.13.2021  Patient expressed understanding of goal: Pt reports understanding and denies any additional questions or concerns at this times. SENTHIL CC engaged in AIDET communication during encounter.    Action steps to achieve this goal:  1. I will review the letter sent by SENTHIL HOWE.   2. I will contact a Disability advocacy agency.   3. I will work with a disability advocacy agency to apply for SSDI.      05/17, CHW:    Patient reports that she had sent the initial application process for disability and is currently working on step 2 of the paperwork process.        Intervention and Education during outreach:   CHW inquired if there was any other support or resources needed.  Virginia stated that there has been a lot of stress in her life lately and was hoping for some resources.  Patient would like resources for stress management or mental health support for someone that she can speak to.  Patient understands that she can contact CC for any other needs.    CHW Plan: CHW will mail out resources for mental health support.  CHW will continue monthly outreaches to monitor progression of goals.    REY Hensley  Clinic Care Coordination  [facility] Clinics : [place], [place], and [place]  Phone: [phone]    ______________________  Next Outreach:  06/17/21  Planned Outreach Frequency: Monthly  Preferred Phone Number: [phone]    Enrollment Date:  01/13/21  Last Care Plan  Assessment:  01/13/21

## 2021-05-17 NOTE — LETTER
May 17, 2021      Abiola Matute  [address] [facility] [address]      Geovanna Coleman,    This is Tanner, Community Health Worker, with the Care Coordination team. I just wanted to thank you for taking the time to speak to me. As discussed I wanted to provide you some resources for mental health support.     Mental Health Resources      Depression and Bipolar Support [place] (DBSA) Support Groups    Support groups do not substitute for medical attention or therapy. Rather, they provide a much needed place for people with common concerns to meet and find support.  At support groups, participants learn and gather strength from each other. Each support group participant has the opportunity to voice opinions, ask questions, and share personal experiences.    You are welcome to attend at any time. You do not need to pre-register. However, it can be beneficial to talk to the facilitator prior to attending so you know what to expect.    For more information about DBSA groups and how they can help you or a loved one, visit http://www.dbsalliance.org/.    DBSA Support Group Locations:    [place]    1st & 3rd Monday, 7 - 8:30 pm, [facility], [address] [place] [address], for more information contact [place], [phone]  (Please note: This group does not meet on Jesse Santy Miguelangel Jr. Day, Presidents  Day, Memorial Day, or Labor Day.)      Mental Health [place]    One in five people will experience a mental health condition in their lifetime.  You are not alone, and help is available. Contact us today.    Phone: [phone]    Email: [email or web address]    Or use our online chat to talk with Helpline staff      [id number]    This is a time of unprecedented stress and struggle, but you don t have to face it alone.    Asking for help and support can be hard to do. But it doesn t have to be. We are here with confidential, anonymous support and a listening eat. All you have to do is call 238-DOIW0YO    I  also enclosed information on the [facility]. If you do have any questions or concerns. Feel free to contact me at [phone].    REY Hensley  Clinic Care Coordination  [facility] : [place], [place], and [place]  Phone: [phone]

## 2021-05-25 ENCOUNTER — MEDICAL CORRESPONDENCE (OUTPATIENT)
Dept: HEALTH INFORMATION MANAGEMENT | Facility: CLINIC | Age: 56
End: 2021-05-25

## 2021-05-25 NOTE — PROGRESS NOTES
Due to mandate from [facility] pateint might transiton to [facility]   Forms completed and faxed to [employer]  Also scanned into the record.     Contacted [employer] and left a message for patient that a new referral with orders to transition to [facility]

## 2021-05-27 ENCOUNTER — PATIENT OUTREACH (OUTPATIENT)
Dept: CARE COORDINATION | Facility: CLINIC | Age: 56
End: 2021-05-27

## 2021-05-27 NOTE — LETTER
[facility]  [address] [facility] [address]  May 27, 2021        Abiola Matute  [address] [facility] [address]          Dear Janel Culver is an updated Complex Care Plan for your continued enrollment in Care Coordination. Please let us know if you have additional questions, concerns, or goals that we can assist with.    Sincerely,    MARKUS Valencia  Clinic Care Coordinator  Ph. [phone]  [email or web address]  Complex Care Plan  About Me:    Patient Name:  Abiola Mattue    YOB: 1965  Age:         55 year old   [facility] MRN:    [id number] Telephone Information:  Home Phone [phone]   Mobile [phone]       Address:  [address] [place] [address] Email address:  [email or web address]      Emergency Contact(s)    Name Relationship Lgl Grd Work Phone Home Phone Mobile Phone   1. ANDERSJOON Spouse   [phone]   2. WIL ALBARRAN Sister   [phone]           Primary language:  English     needed? No   [facility] [employer]:  [phone] op. 1  Other communication barriers: None  Preferred Method of Communication:  Mail  Current living arrangement: I live in a private home  Mobility Status/ Medical Equipment: Independent    Health Maintenance  Health Maintenance Reviewed: Due/Overdue   Health Maintenance Due   Topic Date Due     HEPATITIS B IMMUNIZATION (1 of 3 - Risk 3-dose series) Never done     MAMMO SCREENING  01/16/2021       My Access Plan  Medical Emergency 911   Primary Clinic Line [facility] [phone]   24 Hour Appointment Line [phone] or  [phone]) (toll-free)   24 Hour Nurse Line [phone] (toll-free)   Preferred Urgent Care     Preferred Hospital [facility]  [phone]   Preferred Pharmacy [facility]  [place], [place] - [address] [place] [address]     Behavioral Health Crisis Line The National Suicide Prevention Lifeline at 1-495.705.3514 or 911     My Care Team Members  Patient Care Team       Relationship Specialty Notifications Start End    Linda Macdonald APRN CNP PCP - General Family Medicine  2/22/21     Phone: [phone] Fax: [phone]         [address] [place] [address]    Diane Mckay RN Specialty Care Coordinator Gastroenterology  11/29/19     Phone: [phone]         Sonia Pacheco [facility] Pharmacist Pharmacist Ambulatory Care  4/7/20     Phone: [phone]          [facility] [address] [facility] [address]    Wilner Estrada MD Assigned Surgical Provider   10/23/20     Phone: [phone] Fax: [phone]         [address] [facility] [address]    Jeannette Cervantes MD Assigned Gastroenterology Provider   10/23/20     Phone: [phone] Fax: [phone]         [address] [facility] [address] [facility] [address]    Cathryn Goss LGSW Lead Care Coordinator   1/13/21     Tanner Lamb Community Health Worker   1/13/21     Linda Macdonald APRN CNP Assigned PCP   2/28/21     Phone: [phone] Fax: [phone]         [address] [place] [address]    Sheldon Serrato PA-C Assigned Heart and Vascular Provider   3/17/21     Phone: [phone] Fax: [phone]         [address] [facility] [address]            My Care Plans  Self Management and Treatment Plan  Goals and (Comments)  Goals        General    1. Psychosocial (pt-stated)     Notes - Note edited  2/15/2021 10:54 AM by Cathryn Goss LGSW    Goal Statement: I want to apply for Social Security Disability within 6 months.   Date Goal set: 1.13.2021  Barriers: Navigating application/paperwork.  Strengths: Recognition of need, willingness to collaborate with Disability Specialists.   Date to Achieve By: 7.13.2021  Patient expressed understanding of goal: Pt  reports understanding and denies any additional questions or concerns at this times. SENTHIL HOWE engaged in [employer] communication during encounter.    Action steps to achieve this goal:  1. I will review the letter sent by SENTHIL HOWE.   2. I will contact a Disability advocacy agency.   3. I will work with a disability advocacy agency to apply for SSDI.                    My Medical and Care Information  Problem List   Patient Active Problem List   Diagnosis     Non morbid obesity due to excess calories     Other isolated or specific phobias     Other congenital malformations of mouth     Contact dermatitis and other eczema, due to unspecified cause     Intestinal infection due to Clostridium difficile     Iron deficiency anemia, unspecified iron deficiency anemia type     Rosacea     Atypical ductal hyperplasia of left breast     Idiopathic thrombocytopenia (H)     Postmenopausal- s/p hysterectomy with BSO - not on HRT secondary to atypical ductal hyperplasia & breast pain -no paps needed     Claustrophobia     S/P total hysterectomy and bilateral salpingo-oophorectomy     Abnormal liver enzymes- ? related to ongoing etoh use/abuse     Essential hypertension with goal blood pressure less than 140/90     Gastroenteritis     Stool incontinence     CARDIOVASCULAR SCREENING; LDL GOAL LESS THAN 130     AA (alcohol abuse) - ? ongoing      Alcoholic fatty liver     Acquired hyperbilirubinemia- ? secondary to alcohol use     Diffuse nodular cirrhosis of liver (H)     Severe Perianal Crohn's Disease     Biliary colic     Cholecystitis     Alcoholic cirrhosis of liver with ascites (H) - seeing [facility]      Abscess of anal or rectal region      Current Medications and Allergies:  See printed Medication Report.  Current Outpatient Medications   Medication Instructions     acetaminophen (TYLENOL) 325-650 mg, Oral, EVERY 6 HOURS PRN, Take either 325mg every 6 hours or 650mg every 8 hours for pain. Do not exceed 2000mg in 24 hours.      furosemide (LASIX) 40 mg, Oral, DAILY     inFLIXimab (REMICADE) 100 MG injection Intravenous, ONCE     Milk Thistle-Dand-Fennel-Licor (MILK THISTLE XTRA) CAPS capsule 1 capsule, Oral, DAILY     Multiple Vitamins-Minerals (MULTIVITAMIN & MINERAL PO) DAILY     spironolactone (ALDACTONE) 100 mg, Oral, DAILY        Allergies   Allergen Reactions     Fish Oil      Redness and itching around eye area only - went away when fish oil capsules stopped      Metronidazole      pain/itching     Naphthalenemethylamines      Lamisil = mild urticarial reaction     Ppd [Tuberculin Purified Protein Derivative]      Sulfa Drugs      hives       Care Coordination Start Date: 9/11/2020   Frequency of Care Coordination: monthly   Form Last Updated: 05/27/2021

## 2021-05-27 NOTE — PROGRESS NOTES
Care Coordination Clinician Chart Review  Situation: Patient chart reviewed by care coordinator.       Background: Care Coordination initial assessment and enrollment to Care Coordination was 9/2020.   Patient centered goals were developed with participation from patient.  SENTHIL HOWE handed patient off to CHW for continued outreach every 30 days.        Assessment: Per chart review, patient outreach completed by CC CHW on 5.17.2021.  Patient is actively working to accomplish goal.  Patient's goal remains appropriate and relevant at this time.   Patient is due for updated Complex Care Plan.  Annual assessment will be due 2/2022.      Goals        Patient Stated      1. Psychosocial (pt-stated)      Goal Statement: I want to apply for Social Security Disability within 6 months.   Date Goal set: 1.13.2021  Barriers: Navigating application/paperwork.  Strengths: Recognition of need, willingness to collaborate with Disability Specialists.   Date to Achieve By: 7.13.2021  Patient expressed understanding of goal: Pt reports understanding and denies any additional questions or concerns at this times. SENTHIL HOWE engaged in AIDET communication during encounter.    Action steps to achieve this goal:  1. I will review the letter sent by SENTHIL HOWE.   2. I will contact a Disability advocacy agency.   3. I will work with a disability advocacy agency to apply for SSDI.                  Plan/Recommendations: The patient will continue working with Care Coordination to achieve goal as above.  CHW will involve SENTHIL HOWE as needed or if patient is ready to move to maintenance.  SENTHIL HOWE will continue to monitor progress to goals and CHW outreaches every 6 weeks.   Care Plan updated and mailed to patient: Yes, Neri Boothe [facility]  [facility] Care Coordinator  Ph. [phone]  [email or web address]

## 2021-06-03 ENCOUNTER — MEDICAL CORRESPONDENCE (OUTPATIENT)
Dept: HEALTH INFORMATION MANAGEMENT | Facility: CLINIC | Age: 56
End: 2021-06-03

## 2021-06-03 ENCOUNTER — ANCILLARY PROCEDURE (OUTPATIENT)
Dept: MAMMOGRAPHY | Facility: CLINIC | Age: 56
End: 2021-06-03
Attending: NURSE PRACTITIONER
Payer: COMMERCIAL

## 2021-06-03 PROCEDURE — 77063 BREAST TOMOSYNTHESIS BI: CPT | Mod: TC | Performed by: RADIOLOGY

## 2021-06-03 PROCEDURE — 77067 SCR MAMMO BI INCL CAD: CPT | Mod: TC | Performed by: RADIOLOGY

## 2021-06-16 NOTE — PROGRESS NOTES
Collaborated with pharmacist onsite regarding patient receiving second dose covid vaccine prior to 17-21 dose window. Per pharmacist okay administer. Patient was told by  it was is okay.

## 2021-06-22 DIAGNOSIS — K50.113 CROHN'S DISEASE OF LARGE INTESTINE WITH FISTULA (H): ICD-10-CM

## 2021-06-22 LAB
BASOPHILS # BLD AUTO: 0 10E9/L (ref 0–0.2)
BASOPHILS NFR BLD AUTO: 0.5 %
DIFFERENTIAL METHOD BLD: ABNORMAL
EOSINOPHIL # BLD AUTO: 0.2 10E9/L (ref 0–0.7)
EOSINOPHIL NFR BLD AUTO: 4.5 %
ERYTHROCYTE [DISTWIDTH] IN BLOOD BY AUTOMATED COUNT: 13.9 % (ref 10–15)
ERYTHROCYTE [SEDIMENTATION RATE] IN BLOOD BY WESTERGREN METHOD: 29 MM/H (ref 0–30)
HCT VFR BLD AUTO: 38.9 % (ref 35–47)
HGB BLD-MCNC: 13.7 G/DL (ref 11.7–15.7)
LYMPHOCYTES # BLD AUTO: 1.1 10E9/L (ref 0.8–5.3)
LYMPHOCYTES NFR BLD AUTO: 27.6 %
MCH RBC QN AUTO: 35.8 PG (ref 26.5–33)
MCHC RBC AUTO-ENTMCNC: 35.2 G/DL (ref 31.5–36.5)
MCV RBC AUTO: 102 FL (ref 78–100)
MONOCYTES # BLD AUTO: 0.5 10E9/L (ref 0–1.3)
MONOCYTES NFR BLD AUTO: 11.3 %
NEUTROPHILS # BLD AUTO: 2.2 10E9/L (ref 1.6–8.3)
NEUTROPHILS NFR BLD AUTO: 56.1 %
PLATELET # BLD AUTO: 97 10E9/L (ref 150–450)
RBC # BLD AUTO: 3.83 10E12/L (ref 3.8–5.2)
WBC # BLD AUTO: 4 10E9/L (ref 4–11)

## 2021-06-22 PROCEDURE — 36415 COLL VENOUS BLD VENIPUNCTURE: CPT | Performed by: INTERNAL MEDICINE

## 2021-06-22 PROCEDURE — 80076 HEPATIC FUNCTION PANEL: CPT | Performed by: INTERNAL MEDICINE

## 2021-06-22 PROCEDURE — 86140 C-REACTIVE PROTEIN: CPT | Performed by: INTERNAL MEDICINE

## 2021-06-22 PROCEDURE — 85652 RBC SED RATE AUTOMATED: CPT | Performed by: INTERNAL MEDICINE

## 2021-06-22 PROCEDURE — 85025 COMPLETE CBC W/AUTO DIFF WBC: CPT | Performed by: INTERNAL MEDICINE

## 2021-06-23 LAB
ALBUMIN SERPL-MCNC: 3.6 G/DL (ref 3.4–5)
ALP SERPL-CCNC: 104 U/L (ref 40–150)
ALT SERPL W P-5'-P-CCNC: 52 U/L (ref 0–50)
AST SERPL W P-5'-P-CCNC: 70 U/L (ref 0–45)
BILIRUB DIRECT SERPL-MCNC: 0.5 MG/DL (ref 0–0.2)
BILIRUB SERPL-MCNC: 1.8 MG/DL (ref 0.2–1.3)
CRP SERPL-MCNC: 5.4 MG/L (ref 0–8)
PROT SERPL-MCNC: 8.1 G/DL (ref 6.8–8.8)

## 2021-06-24 ENCOUNTER — MEDICAL CORRESPONDENCE (OUTPATIENT)
Dept: HEALTH INFORMATION MANAGEMENT | Facility: CLINIC | Age: 56
End: 2021-06-24

## 2021-06-28 DIAGNOSIS — K72.90 DECOMPENSATION OF CIRRHOSIS OF LIVER (H): ICD-10-CM

## 2021-06-28 DIAGNOSIS — K74.60 DECOMPENSATION OF CIRRHOSIS OF LIVER (H): ICD-10-CM

## 2021-06-29 RX ORDER — FUROSEMIDE 40 MG
TABLET ORAL
Qty: 90 TABLET | Refills: 3 | Status: SHIPPED | OUTPATIENT
Start: 2021-06-29 | End: 2021-12-17

## 2021-06-29 RX ORDER — SPIRONOLACTONE 100 MG/1
TABLET, FILM COATED ORAL
Qty: 90 TABLET | Refills: 3 | Status: SHIPPED | OUTPATIENT
Start: 2021-06-29 | End: 2021-12-17

## 2021-06-30 ENCOUNTER — PATIENT OUTREACH (OUTPATIENT)
Dept: CARE COORDINATION | Facility: CLINIC | Age: 56
End: 2021-06-30

## 2021-06-30 NOTE — LETTER
[facility]  [address] [facility] [address]  Phone: [phone]      July 21, 2021      Abiola Matute  [address] [facility] [address]      Geovanna Coleman    I have been attempting to reach you since our last contact. I just wanted to check in and follow-up to see how you've been doing since our last outreach. If you would like to continue with our Care Coordination services or have any other health care needs, please give me a call at [phone] at your earliest convenience.     All of us at [facility] are invested in your health and are here to assist you in meeting your goals.     REY Hensley  Clinic Care Coordination  [facility] : [facility], and [place]  Phone: [phone]

## 2021-06-30 NOTE — PROGRESS NOTES
Clinic Care Coordination Contact  [facility]/Voicemail       Clinical Data: Care Coordinator Outreach  Outreach attempted x 1.  Left message on patient's voicemail with call back information and requested return call.    Plan:  Care Coordinator will try to reach patient again in 10 business days.    REY Hensley  Clinic Care Coordination  [facility] : [place], [place], and [place]  Phone: [phone]    ______________________  Next Outreach:  07/14/21  Planned Outreach Frequency: Monthly  Preferred Phone Number: [phone]    Enrollment Date:  01/13/21  Last Care Plan Assessment:  05/27/21

## 2021-07-14 ENCOUNTER — MEDICAL CORRESPONDENCE (OUTPATIENT)
Dept: HEALTH INFORMATION MANAGEMENT | Facility: CLINIC | Age: 56
End: 2021-07-14

## 2021-07-16 ENCOUNTER — PATIENT OUTREACH (OUTPATIENT)
Dept: CARE COORDINATION | Facility: CLINIC | Age: 56
End: 2021-07-16

## 2021-07-16 NOTE — PROGRESS NOTES
Care Coordination Clinician Chart Review  Situation: Patient chart reviewed by care coordinator.       Background: Care Coordination initial assessment and enrollment to Care Coordination was 9/2020.   Patient centered goals were developed with participation from patient.  SENTHIL HOWE handed patient off to CHW for continued outreach every 30 days.        Assessment: Per chart review, patient outreach completed by CC CHW on 6.30.2021 (unreachable). Patient's goal remains appropriate and relevant at this time.   Patient is not due for updated Complex Care Plan.  Annual assessment will be due 2/2022.      Goals        Patient Stated       1. Psychosocial (pt-stated)       Goal Statement: I want to apply for Social Security Disability by the end of the year.   Date Goal set: 1.13.2021 (extended 7.16.2021 to 12.31.2021)  Barriers: Navigating application/paperwork.  Strengths: Recognition of need, willingness to collaborate with Disability Specialists.   Date to Achieve By: 12.31.2021  Patient expressed understanding of goal: Pt reports understanding and denies any additional questions or concerns at this times. SENTHIL HOWE engaged in AIDET communication during encounter.    Action steps to achieve this goal:  1. I will review the letter sent by SENTHIL HOWE.   2. I will contact a Disability advocacy agency.   3. I will work with a disability advocacy agency to apply for SSDI.                  Plan/Recommendations: The patient will continue working with Care Coordination to achieve goal as above.  CHW will involve SENTHIL HOWE as needed or if patient is ready to move to maintenance.  SENTHIL HOWE will continue to monitor progress to goals and CHW outreaches every 6 weeks.   Care Plan updated and mailed to patient: Agata Boothe [facility] Care Coordinator  Ph. [phone]  [email or web address]

## 2021-07-18 ENCOUNTER — TELEPHONE (OUTPATIENT)
Dept: PLASTIC SURGERY | Facility: CLINIC | Age: 56
End: 2021-07-18

## 2021-07-21 NOTE — TELEPHONE ENCOUNTER
CHW documentation reviewed. CHW to complete a final outreach attempt in one month. Patient may be a candidate for disenrollment if unreachable at that time.  CC will continue to follow.     MARKUS Valencia  Clinic Care Coordinator  Ph. [phone]  [email or web address]

## 2021-07-21 NOTE — PROGRESS NOTES
Clinic Care Coordination Contact  [facility]/Voicemail       Clinical Data: Care Coordinator Outreach  Outreach attempted x 2.  Left message on patient's voicemail with call back information and requested return call.    Plan: Care Coordinator will send unable to contact letter with care coordinator contact information via [employer]. Care Coordinator will do no further outreaches at this time.    REY Hensley  Clinic Care Coordination  [facility] : [place], [place], and [place]  Phone: [phone]

## 2021-08-03 ENCOUNTER — VIRTUAL VISIT (OUTPATIENT)
Dept: GASTROENTEROLOGY | Facility: CLINIC | Age: 56
End: 2021-08-03
Payer: COMMERCIAL

## 2021-08-03 VITALS — WEIGHT: 178 LBS | HEIGHT: 66 IN | BODY MASS INDEX: 28.61 KG/M2

## 2021-08-03 DIAGNOSIS — K50.113 CROHN'S DISEASE OF LARGE INTESTINE WITH FISTULA (H): Primary | ICD-10-CM

## 2021-08-03 PROCEDURE — 99214 OFFICE O/P EST MOD 30 MIN: CPT | Mod: GT | Performed by: PHYSICIAN ASSISTANT

## 2021-08-03 ASSESSMENT — PAIN SCALES - GENERAL: PAINLEVEL: MODERATE PAIN (5)

## 2021-08-03 ASSESSMENT — MIFFLIN-ST. JEOR: SCORE: 1409.15

## 2021-08-03 NOTE — LETTER
8/3/2021         RE: Abiola Matute  [address] [id number]        Dear Colleague,    Thank you for referring your patient, Abiola Matute, to the [facility] [place]. Please see a copy of my visit note below.    IBD CLINIC VISIT    CC/REFERRING MD:  Referred Self  REASON FOR CONSULTATION: Crohn's.     ASSESSMENT/PLAN:  56 year old female with cirrhosis and Crohn's    1.  Crohn's disease: Perianal fistula continues to be present, seton in place and symptoms of naval + perianal drainage persist despite dose change to every 4 weeks at 10mg/kg.  Encouraging no luminal disease present on most recent flex sig, and it may take 6-12 months before perianal disease shows improvement on the change of dose. She mentioned gaining about 10 lbs over the summer and her dose may need to be adjusted. I will be in communication with our pharmacy team about this adjustment. We will also obtain a fecal calprotectin now to trend given symptoms.     This may also be the best clinical picture we are able to achieve at this dose and she may likely continue to require colorectal assistance with seton placements for perianal disease. Given luminal disease remission and an adequate level of Remicade (>25), it is difficult to determine if pushing the dose even further if it would allow for improvement in perianal disease.   -- Continue infliximab 10mg/kg every 4 weeks. May need dose adjustment based on weight  -- Fecal calprotectin  -- Consider repeat MRI pelvis in approx 3-6 months to monitor interval response to increased dose.   -- Laboratory studies to be completed while on infliximab therapy to include CBC, LFTs, CRP and ESR.     2. Cirrhosis: Some notes state cryptogenic, some notes state alcoholic. Recent variceal screening with EGD was unremarkable.  Patient is now established with hepatology with Dr. Cervantes.     IBD HISTORY  Age at diagnosis: 53 (4/2019)  Extent of  disease: miguel angel-anal, anal  Disease phenotype: Miguel Angel-anal fistula  Miguel Angel-anal disease: Yes  Current CD medications:  - Infliximab - started May 2019. Dose increase to 10 mg/kg every 8 weeks but had continued active disease, dose increase to 10 mg/kg every 4 weeks 11/2020.  Prior IBD surgeries: No. Seton placements  Prior IBD Medications:    DRUG MONITORING  TPMT enzyme activity:     6-TGN/6-MMPN levels:    Biologic concentration:  10/2/2019: IFX 0.8, anti-IFX antibodies: 451 (Esoterix)  1/22/2020 infliximab level 2.3, no antibodies (this is an 8-week trough at 10 mg/kg)  3/4/21 IFX 25.1, no antibodies (4 week trough on 10mg/kg)    DISEASE ASSESSMENT  Labs  Recent Labs   Lab Test 06/22/21  1427 04/27/21  1503   CRP 5.4 7.2   SED 29 32*     Fecal calprotectin: --  Endoscopy: flex sig 3/2021 shows isolated perianal disease  Enterography: --  C diff: --    sIBDQ:   No flowsheet data found.    IBD Health Care Maintenance:  Vaccinations:  All patients on biologics should avoid live vaccines.    -- Influenza (every year)  -- TdaP (every 10 years)  -- Pneumococcal Pneumonia (once plus booster at 5 years)  -- Yearly assessment for latent Tb (verbal screening and exam, PPD or QuantiFERON-Tb testing)    One time confirmation of immunity or serologies:  -- Hepatitis A (serologies or immunizations)  -- Hepatitis B (serologies or immunizations)  -- Varicella  -- MMR  -- HPV (all aged 18-26)  -- Meningococcal meningitis (all patients at risk for meningitis)  -- Due to the immunosuppression in this patient, I would not advise administration of live vaccines such as varicella/VZV, intranasal influenza, MMR, or yellow fever vaccine (if travelling).      Bone mineral density screening   -- Recommend all patients supplement with calcium and vitamin D  -- Consider DEXA if not already done    Cancer Screening:  Colon cancer screening:  Unclear colonic extent of disease at this time.      Cervical cancer screening: Per OBGYN    Skin cancer  screening: Annual visual exam of skin by dermatologist since patient is immunocompromised    Depression Screening:  PHQ-2 Score:     PHQ-2 ( 1999 Pfizer) 1/25/2021 9/4/2020   Q1: Little interest or pleasure in doing things 0 0   Q2: Feeling down, depressed or hopeless 1 0   PHQ-2 Score 1 0   Q1: Little interest or pleasure in doing things - Not at all   Q2: Feeling down, depressed or hopeless - Not at all   PHQ-2 Score - 0     Misc:  -- Avoid tobacco use  -- Avoid NSAIDs as there is potentially a 25% chance of causing an IBD flare    Return to clinic in 3 months    Thank you for this consultation.  It was a pleasure to participate in the care of this patient; please contact us with any further questions.     This note was created with voice recognition software, and while reviewed for accuracy, typos may remain.       Sheldon Serrato PA-C  Division of Gastroenterology, Hepatology and Nutrition  [facility]       HPI:   Patient for follow up of perianal crohn's disease.  Patient additional has hx of alcoholic cirrhosis and follows with hepatology.      Previous hx includes:  She is a history of long-standing diarrhea as well as recurrent aphthous ulcers.  In April 2019 she was diagnosed with Crohn's disease due to perianal fistula.  She started infliximab due to perianal disease, and per colorectal surgery note had may be a 30% improvement in fistula output, however the patient stated she did not have improvement. Additionally, patient had an episode of cholecystitis - treated medically due to liver numbers.  She also had an ERCP with cystic and pancreatic placement + papillary sphincterotomy on 4/24/20 due to acute on chronic cholecystitis.    Infliximab level had originally been obtained in oct 2019 showing 10/2/2019: IFX 0.8, anti-IFX antibodies: 451 (Esoterix).  Repeat Infliximab level obtained 11/12/19 was 2.3, no antibodies. Dose was optimized to 10mg/kg every 8 weeks. Colonoscopy 12/2019 showed  isolated perianal disease, seton was in place.  A new level obtained 1/2020 was also 2.3, no antibodies. No changes at that time.  She did have an improvement with the dose change to 10mg/kg, however she up to 3-4 weeks before her next infusion she will have return of canker sores, increased joint pain and an increase in frequency in her stools.  Dose was ultimately changed to every 4 weeks Nov 2020. Repeat level 25.1, no antibodies (3/4/21).    Flex sig 3/1/21 showed no active crohn's observed, bx with no active inflammation. She had a replacement of seton and excision of anal skin tag on 2/26/21 with Dr. Nam.      Even with the dose change to every 4 weeks, approximately one week before infusion she notes inside her belly button there is a small opening that will weep. It will resolve a day or two after infusion. Additional she also have small opening approx 2-3 inches above rectum that will weep and eventually a day or two infusion.     She was switched to inflectra and with this switch, she is feeling that symptoms of umbilical weeping and mouth sores have now started 2+ weeks before infusion. She notes she had gained about 10 lbs over the summer.  Bowel pattern is unchanged. -2-3 times would be low, can be up to 7-8. No blood in the stool.     More stress with personal issues at this time.    ROS:    No fevers or chills  + weight gain (164 lb and now is 178 lbs)  No blurry vision, double vision or change in vision  No sore throat  No lymphadenopathy  No headache, paraesthesias, or weakness in a limb  No shortness of breath or wheezing  No chest pain or pressure  No arthralgias or myalgias  No rashes or skin changes  No odynophagia or dysphagia  No BRBPR, hematochezia, melena  No dysuria, frequency or urgency  No hot/cold intolerance or polyria  No anxiety or depression    Extra intestinal manifestations of IBD:  No uveitis/episcleritis  + aphthous ulcers (come and go, not every month)  + arthritis   No  erythema nodosum/pyoderma gangrenosum.     PERTINENT PAST MEDICAL HISTORY:  Past Medical History:   Diagnosis Date     Alcohol abuse      Alcoholic cirrhosis of liver with ascites      Atypical ductal hyperplasia of breast 9/10/10    ERT not recommended -left - and flat epithelial atypia-scheduled for breast biopsy 9/17/2010      Bifid uvula      Cholelithiasis      Contact perianal dermatitis and other eczema     recurrent - clobetasol      Crohn disease      Fear of flying      - gets Ativan prn.      Hypertension      IBS (irritable bowel syndrome)        PREVIOUS SURGERIES:  Past Surgical History:   Procedure Laterality Date     BIOPSY ANAL N/A 3/28/2019    anal biopsy and culure placement of seton - Dr Fleming     BIOPSY BREAST Left 09/17/2010    - scheduled with Dr. Varma      BIOPSY LIVER  2019     C APPENDECTOMY  at age 14     COLONOSCOPY  2006     COLONOSCOPY N/A 12/23/2014    Procedure: COMBINED COLONOSCOPY, SINGLE OR MULTIPLE BIOPSY/POLYPECTOMY BY BIOPSY;  Surgeon: Diane Fleming MD;  Location:  GI     COLONOSCOPY N/A 12/5/2019    Procedure: COLONOSCOPY, WITH POLYPECTOMY AND BIOPSY;  Surgeon: Farhan Schilling MD;  Location:  GI     ENDOSCOPIC RETROGRADE CHOLANGIOPANCREATOGRAM N/A 4/24/2020    Procedure: ENDOSCOPIC RETROGRADE CHOLANGIOPANCREATOGRAPHY WITH, sledge removal,sphincterotomy, stent in gallbladder and pancreatic duct stent, and balloon dilation;  Surgeon: Guru Isac Kraft MD;  Location:  OR     ESOPHAGOSCOPY, GASTROSCOPY, DUODENOSCOPY (EGD), COMBINED N/A 12/5/2019    Procedure: ESOPHAGOGASTRODUODENOSCOPY (EGD);  Surgeon: Farhan Schilling MD;  Location: [facility]     EXAM UNDER ANESTHESIA ANUS N/A 3/28/2019    Procedure: EXAM UNDER ANESTHESIA ANUS;  Surgeon: Diane Fleming MD;  Location:  OR     EXAM UNDER ANESTHESIA ANUS N/A 2/26/2021    Procedure: EXAM UNDER ANESTHESIA OF ANUS, SETON PLACEMENT, EXCISION OF SKIN BRIDGE;  Surgeon: Avtar Nam,  MD;  Location: [facility] OR     HYSTERECTOMY, VAGINAL  2006    with Dr. Licha Zhou - with BSO for fibroids      IR GALLBLADDER DRAIN PLACEMENT  4/22/2020     OPEN REDUCTION INTERNAL FIXATION ANKLE Left at age 27    plates and screws removed at age 36     Pelviscopy with removal of bilateral hydrosalpinges.  04/15/2010       PREVIOUS ENDOSCOPY:  3/7/19: Flex sig: Colon and rectum appeared normal. 2 large deep ulcers extending from the anal cnaal to left perianal area.     10/23/19: Colonoscopy: Endoscopically normal ileum.  Mild pan colonic congestion with contact friability thought to be related to portal hypertension and thrombocytopenia.  Prior anal verge ulcer is healed    12/23/14: Colonoscopy: Perianal skin tag noted.  Ileum normal, colon normal.    ALLERGIES:     Allergies   Allergen Reactions     Fish Oil      Redness and itching around eye area only - went away when fish oil capsules stopped      Metronidazole      pain/itching     Naphthalenemethylamines      Lamisil = mild urticarial reaction     Ppd [Tuberculin Purified Protein Derivative]      Sulfa Drugs      hives       PERTINENT MEDICATIONS:    Current Outpatient Medications:      acetaminophen (TYLENOL) 325 MG tablet, Take 1-2 tablets (325-650 mg) by mouth every 6 hours as needed for mild pain Take either 325mg every 6 hours or 650mg every 8 hours for pain. Do not exceed 2000mg in 24 hours., Disp: 100 tablet, Rfl: 0     furosemide (LASIX) 40 MG tablet, TAKE 1 TABLET BY MOUTH  DAILY, Disp: 90 tablet, Rfl: 3     inFLIXimab (REMICADE) 100 MG injection, Inject into the vein once for 1 dose, Disp:  , Rfl:      Milk Thistle-Dand-Fennel-Licor (MILK THISTLE XTRA) CAPS capsule, Take 1 capsule by mouth daily, Disp: , Rfl:      Multiple Vitamins-Minerals (MULTIVITAMIN & MINERAL PO), Take  by mouth daily., Disp: , Rfl:      spironolactone (ALDACTONE) 100 MG tablet, TAKE 1 TABLET BY MOUTH  DAILY, Disp: 90 tablet, Rfl: 3    SOCIAL HISTORY:  Social History      Socioeconomic History     Marital status:      Spouse name: Albino     Number of children: 3     Years of education: 14     Highest education level: Not on file   Occupational History     Occupation: [employer]     Comment:    Tobacco Use     Smoking status: Never Smoker     Smokeless tobacco: Never Used   Substance and Sexual Activity     Alcohol use: Not Currently     Alcohol/week: 0.0 standard drinks     Comment: occasional     Drug use: No     Comment: no herbal meds either     Sexual activity: Yes     Partners: Male     Birth control/protection: Surgical     Comment: husb had vasectomy   Other Topics Concern      Service No     Blood Transfusions No     Caffeine Concern No     Comment: rarely drinks caffeine     Occupational Exposure Not Asked     Hobby Hazards Not Asked     Sleep Concern Not Asked     Stress Concern Not Asked     Weight Concern Not Asked     Special Diet Not Asked     Back Care Not Asked     Exercise Yes     Comment: does a lot of walking - 4x/week     Bike Helmet Not Asked     Seat Belt Yes     Comment: always     Self-Exams Yes     Comment: SBE encouraged monthly     Parent/sibling w/ CABG, MI or angioplasty before 65F 55M? Yes   Social History Narrative    calcium - drinks 5-6 large glasses skim milk/day    flex sig/colonoscopy -at age 49    sun precautions - discussed    mammogram - needs every 2 years in her 30's, then yearly from then on    Td booster - 9/99 and 4/27/2010    pneumovax -at age 59    DEXA -when perimenopausal    stool hemoccults - every year after age 40    ASA- start at age 40    mulvitamin - encouraged     Social Determinants of Health     Financial Resource Strain: Low Risk      Difficulty of Paying Living Expenses: Not very hard   Food Insecurity: No Food Insecurity     Worried About Running Out of Food in the Last Year: Never true     Ran Out of Food in the Last Year: Never true   Transportation Needs: No Transportation  Needs     Lack of Transportation (Medical): No     Lack of Transportation (Non-Medical): No   Physical Activity: Unknown     Days of Exercise per Week: 0 days     Minutes of Exercise per Session: Not on file   Stress: Stress Concern Present     Feeling of Stress : Rather much   Social Connections: Unknown     Frequency of Communication with Friends and Family: More than three times a week     Frequency of Social Gatherings with Friends and Family: More than three times a week     Attends Scientologist Services: Not on file     Active Member of Clubs or Organizations: Not on file     Attends Club or Organization Meetings: Not on file     Marital Status: Not on file   Intimate Partner Violence:      Fear of Current or Ex-Partner:      Emotionally Abused:      Physically Abused:      Sexually Abused:        FAMILY HISTORY:  Family History   Problem Relation Age of Onset     Breast Cancer Mother      Gastrointestinal Disease Mother      Heart Disease Father 57     Heart Disease Paternal Grandfather      Hypertension Maternal Grandmother      Diabetes Paternal Grandmother      Diabetes Maternal Grandfather      Cancer Sister        Past/family/social history reviewed and no changes    PHYSICAL EXAMINATION:  Constitutional: aaox3, cooperative, pleasant, not dyspneic/diaphoretic, no acute distress   LMP 05/31/2006   Wt:   Wt Readings from Last 2 Encounters:   04/27/21 80.3 kg (177 lb)   03/23/21 79.4 kg (175 lb)      Exam deferred as this was a telephone encounter due to the coronavirus outbreak.    PERTINENT STUDIES:  Most recent CBC:  Recent Labs   Lab Test 06/22/21  1427 04/27/21  1503   WBC 4.0 5.1   HGB 13.7 15.3   HCT 38.9 43.8   PLT 97* 119*     Most recent hepatic panel:  Recent Labs   Lab Test 06/22/21  1427 04/27/21  1503   ALT 52* 55*   AST 70* 72*     Most recent creatinine:  Recent Labs   Lab Test 02/23/21  1118 09/04/20  1413   CR 0.71 0.73     Again, thank you for allowing me to participate in the care of your  patient.      Sincerely,  Sheldon Serrato PA-C

## 2021-08-03 NOTE — PROGRESS NOTES
IBD CLINIC VISIT    CC/REFERRING MD:  Referred Self  REASON FOR CONSULTATION: Crohn's.     ASSESSMENT/PLAN:  56 year old female with cirrhosis and Crohn's    1.  Crohn's disease: Perianal fistula continues to be present, seton in place and symptoms of naval + perianal drainage persist despite dose change to every 4 weeks at 10mg/kg.  Encouraging no luminal disease present on most recent flex sig, and it may take 6-12 months before perianal disease shows improvement on the change of dose. She mentioned gaining about 10 lbs over the summer and her dose may need to be adjusted. I will be in communication with our pharmacy team about this adjustment. We will also obtain a fecal calprotectin now to trend given symptoms.     This may also be the best clinical picture we are able to achieve at this dose and she may likely continue to require colorectal assistance with seton placements for perianal disease. Given luminal disease remission and an adequate level of Remicade (>25), it is difficult to determine if pushing the dose even further if it would allow for improvement in perianal disease.   -- Continue infliximab 10mg/kg every 4 weeks. May need dose adjustment based on weight  -- Fecal calprotectin  -- Consider repeat MRI pelvis in approx 3-6 months to monitor interval response to increased dose.   -- Laboratory studies to be completed while on infliximab therapy to include CBC, LFTs, CRP and ESR.     2. Cirrhosis: Some notes state cryptogenic, some notes state alcoholic. Recent variceal screening with EGD was unremarkable.  Patient is now established with hepatology with Dr. Cervantes.     IBD HISTORY  Age at diagnosis: 53 (4/2019)  Extent of disease: miguel angel-anal, anal  Disease phenotype: Miguel Angel-anal fistula  Miguel Angel-anal disease: Yes  Current CD medications:  - Infliximab - started May 2019. Dose increase to 10 mg/kg every 8 weeks but had continued active disease, dose increase to 10 mg/kg every 4 weeks 11/2020.  Prior IBD  surgeries: No. Seton placements  Prior IBD Medications:    DRUG MONITORING  TPMT enzyme activity:     6-TGN/6-MMPN levels:    Biologic concentration:  10/2/2019: IFX 0.8, anti-IFX antibodies: 451 (Esoterix)  1/22/2020 infliximab level 2.3, no antibodies (this is an 8-week trough at 10 mg/kg)  3/4/21 IFX 25.1, no antibodies (4 week trough on 10mg/kg)    DISEASE ASSESSMENT  Labs  Recent Labs   Lab Test 06/22/21  1427 04/27/21  1503   CRP 5.4 7.2   SED 29 32*     Fecal calprotectin: --  Endoscopy: flex sig 3/2021 shows isolated perianal disease  Enterography: --  C diff: --    sIBDQ:   No flowsheet data found.    IBD Health Care Maintenance:  Vaccinations:  All patients on biologics should avoid live vaccines.    -- Influenza (every year)  -- TdaP (every 10 years)  -- Pneumococcal Pneumonia (once plus booster at 5 years)  -- Yearly assessment for latent Tb (verbal screening and exam, PPD or QuantiFERON-Tb testing)    One time confirmation of immunity or serologies:  -- Hepatitis A (serologies or immunizations)  -- Hepatitis B (serologies or immunizations)  -- Varicella  -- MMR  -- HPV (all aged 18-26)  -- Meningococcal meningitis (all patients at risk for meningitis)  -- Due to the immunosuppression in this patient, I would not advise administration of live vaccines such as varicella/VZV, intranasal influenza, MMR, or yellow fever vaccine (if travelling).      Bone mineral density screening   -- Recommend all patients supplement with calcium and vitamin D  -- Consider DEXA if not already done    Cancer Screening:  Colon cancer screening:  Unclear colonic extent of disease at this time.      Cervical cancer screening: Per OBGYN    Skin cancer screening: Annual visual exam of skin by dermatologist since patient is immunocompromised    Depression Screening:  PHQ-2 Score:     PHQ-2 ( 1999 Pfizer) 1/25/2021 9/4/2020   Q1: Little interest or pleasure in doing things 0 0   Q2: Feeling down, depressed or hopeless 1 0   PHQ-2  Score 1 0   Q1: Little interest or pleasure in doing things - Not at all   Q2: Feeling down, depressed or hopeless - Not at all   PHQ-2 Score - 0     Misc:  -- Avoid tobacco use  -- Avoid NSAIDs as there is potentially a 25% chance of causing an IBD flare    Return to clinic in 3 months    Thank you for this consultation.  It was a pleasure to participate in the care of this patient; please contact us with any further questions.     This note was created with voice recognition software, and while reviewed for accuracy, typos may remain.       Sheldon Serrato PA-C  Division of Gastroenterology, Hepatology and Nutrition  [facility]       HPI:   Patient for follow up of perianal crohn's disease.  Patient additional has hx of alcoholic cirrhosis and follows with hepatology.      Previous hx includes:  She is a history of long-standing diarrhea as well as recurrent aphthous ulcers.  In April 2019 she was diagnosed with Crohn's disease due to perianal fistula.  She started infliximab due to perianal disease, and per colorectal surgery note had may be a 30% improvement in fistula output, however the patient stated she did not have improvement. Additionally, patient had an episode of cholecystitis - treated medically due to liver numbers.  She also had an ERCP with cystic and pancreatic placement + papillary sphincterotomy on 4/24/20 due to acute on chronic cholecystitis.    Infliximab level had originally been obtained in oct 2019 showing 10/2/2019: IFX 0.8, anti-IFX antibodies: 451 (Esoterix).  Repeat Infliximab level obtained 11/12/19 was 2.3, no antibodies. Dose was optimized to 10mg/kg every 8 weeks. Colonoscopy 12/2019 showed isolated perianal disease, seton was in place.  A new level obtained 1/2020 was also 2.3, no antibodies. No changes at that time.  She did have an improvement with the dose change to 10mg/kg, however she up to 3-4 weeks before her next infusion she will have return of canker sores,  increased joint pain and an increase in frequency in her stools.  Dose was ultimately changed to every 4 weeks Nov 2020. Repeat level 25.1, no antibodies (3/4/21).    Flex sig 3/1/21 showed no active crohn's observed, bx with no active inflammation. She had a replacement of seton and excision of anal skin tag on 2/26/21 with Dr. Nam.      Even with the dose change to every 4 weeks, approximately one week before infusion she notes inside her belly button there is a small opening that will weep. It will resolve a day or two after infusion. Additional she also have small opening approx 2-3 inches above rectum that will weep and eventually a day or two infusion.     She was switched to inflectra and with this switch, she is feeling that symptoms of umbilical weeping and mouth sores have now started 2+ weeks before infusion. She notes she had gained about 10 lbs over the summer.  Bowel pattern is unchanged. -2-3 times would be low, can be up to 7-8. No blood in the stool.     More stress with personal issues at this time.    ROS:    No fevers or chills  + weight gain (164 lb and now is 178 lbs)  No blurry vision, double vision or change in vision  No sore throat  No lymphadenopathy  No headache, paraesthesias, or weakness in a limb  No shortness of breath or wheezing  No chest pain or pressure  No arthralgias or myalgias  No rashes or skin changes  No odynophagia or dysphagia  No BRBPR, hematochezia, melena  No dysuria, frequency or urgency  No hot/cold intolerance or polyria  No anxiety or depression    Extra intestinal manifestations of IBD:  No uveitis/episcleritis  + aphthous ulcers (come and go, not every month)  + arthritis   No erythema nodosum/pyoderma gangrenosum.     PERTINENT PAST MEDICAL HISTORY:  Past Medical History:   Diagnosis Date     Alcohol abuse      Alcoholic cirrhosis of liver with ascites      Atypical ductal hyperplasia of breast 9/10/10    ERT not recommended -left - and flat epithelial  atypia-scheduled for breast biopsy 9/17/2010      Bifid uvula      Cholelithiasis      Contact perianal dermatitis and other eczema     recurrent - clobetasol      Crohn disease      Fear of flying      - gets Ativan prn.      Hypertension      IBS (irritable bowel syndrome)        PREVIOUS SURGERIES:  Past Surgical History:   Procedure Laterality Date     BIOPSY ANAL N/A 3/28/2019    anal biopsy and culure placement of seton - Dr Fleming     BIOPSY BREAST Left 09/17/2010    - scheduled with Dr. Varma      BIOPSY LIVER  2019     C APPENDECTOMY  at age 14     COLONOSCOPY  2006     COLONOSCOPY N/A 12/23/2014    Procedure: COMBINED COLONOSCOPY, SINGLE OR MULTIPLE BIOPSY/POLYPECTOMY BY BIOPSY;  Surgeon: Diane Fleming MD;  Location:  GI     COLONOSCOPY N/A 12/5/2019    Procedure: COLONOSCOPY, WITH POLYPECTOMY AND BIOPSY;  Surgeon: Farhan Schilling MD;  Location: [facility]     ENDOSCOPIC RETROGRADE CHOLANGIOPANCREATOGRAM N/A 4/24/2020    Procedure: ENDOSCOPIC RETROGRADE CHOLANGIOPANCREATOGRAPHY WITH, sledge removal,sphincterotomy, stent in gallbladder and pancreatic duct stent, and balloon dilation;  Surgeon: Guru Isac Kraft MD;  Location:  OR     ESOPHAGOSCOPY, GASTROSCOPY, DUODENOSCOPY (EGD), COMBINED N/A 12/5/2019    Procedure: ESOPHAGOGASTRODUODENOSCOPY (EGD);  Surgeon: Farhan Schilling MD;  [facility]: [facility]     EXAM UNDER ANESTHESIA ANUS N/A 3/28/2019    Procedure: EXAM UNDER ANESTHESIA ANUS;  Surgeon: Diane Fleming MD;  Location:  OR     EXAM UNDER ANESTHESIA ANUS N/A 2/26/2021    Procedure: EXAM UNDER ANESTHESIA OF ANUS, SETON PLACEMENT, EXCISION OF SKIN BRIDGE;  Surgeon: Avtar Nam MD;  [facility] [facility]     HYSTERECTOMY, VAGINAL  2006    with Dr. Licha Zhou - with BSO for fibroids      IR GALLBLADDER DRAIN PLACEMENT  4/22/2020     OPEN REDUCTION INTERNAL FIXATION ANKLE Left at age 27    plates and screws removed at age 36     Pelviscopy with removal of  bilateral hydrosalpinges.  04/15/2010       PREVIOUS ENDOSCOPY:  3/7/19: Flex sig: Colon and rectum appeared normal. 2 large deep ulcers extending from the anal cnaal to left perianal area.     10/23/19: Colonoscopy: Endoscopically normal ileum.  Mild pan colonic congestion with contact friability thought to be related to portal hypertension and thrombocytopenia.  Prior anal verge ulcer is healed    12/23/14: Colonoscopy: Perianal skin tag noted.  Ileum normal, colon normal.    ALLERGIES:     Allergies   Allergen Reactions     Fish Oil      Redness and itching around eye area only - went away when fish oil capsules stopped      Metronidazole      pain/itching     Naphthalenemethylamines      Lamisil = mild urticarial reaction     Ppd [Tuberculin Purified Protein Derivative]      Sulfa Drugs      hives       PERTINENT MEDICATIONS:    Current Outpatient Medications:      acetaminophen (TYLENOL) 325 MG tablet, Take 1-2 tablets (325-650 mg) by mouth every 6 hours as needed for mild pain Take either 325mg every 6 hours or 650mg every 8 hours for pain. Do not exceed 2000mg in 24 hours., Disp: 100 tablet, Rfl: 0     furosemide (LASIX) 40 MG tablet, TAKE 1 TABLET BY MOUTH  DAILY, Disp: 90 tablet, Rfl: 3     inFLIXimab (REMICADE) 100 MG injection, Inject into the vein once for 1 dose, Disp:  , Rfl:      Milk Thistle-Dand-Fennel-Licor (MILK THISTLE XTRA) CAPS capsule, Take 1 capsule by mouth daily, Disp: , Rfl:      Multiple Vitamins-Minerals (MULTIVITAMIN & MINERAL PO), Take  by mouth daily., Disp: , Rfl:      spironolactone (ALDACTONE) 100 MG tablet, TAKE 1 TABLET BY MOUTH  DAILY, Disp: 90 tablet, Rfl: 3    SOCIAL HISTORY:  Social History     Socioeconomic History     Marital status:      Spouse name: Albino     Number of children: 3     Years of education: 14     Highest education level: Not on file   Occupational History     Occupation: [employer]     Comment:    Tobacco Use     Smoking  status: Never Smoker     Smokeless tobacco: Never Used   Substance and Sexual Activity     Alcohol use: Not Currently     Alcohol/week: 0.0 standard drinks     Comment: occasional     Drug use: No     Comment: no herbal meds either     Sexual activity: Yes     Partners: Male     Birth control/protection: Surgical     Comment: harper had vasectomy   Other Topics Concern      Service No     Blood Transfusions No     Caffeine Concern No     Comment: rarely drinks caffeine     Occupational Exposure Not Asked     Hobby Hazards Not Asked     Sleep Concern Not Asked     Stress Concern Not Asked     Weight Concern Not Asked     Special Diet Not Asked     Back Care Not Asked     Exercise Yes     Comment: does a lot of walking - 4x/week     Bike Helmet Not Asked     Seat Belt Yes     Comment: always     Self-Exams Yes     Comment: SBE encouraged monthly     Parent/sibling w/ CABG, MI or angioplasty before 65F 55M? Yes   Social History Narrative    calcium - drinks 5-6 large glasses skim milk/day    flex sig/colonoscopy -at age 49    sun precautions - discussed    mammogram - needs every 2 years in her 30's, then yearly from then on    Td booster - 9/99 and 4/27/2010    pneumovax -at age 59    DEXA -when perimenopausal    stool hemoccults - every year after age 40    ASA- start at age 40    mulvitamin - encouraged     Social Determinants of Health     Financial Resource Strain: Low Risk      Difficulty of Paying Living Expenses: Not very hard   Food Insecurity: No Food Insecurity     Worried About Running Out of Food in the Last Year: Never true     Ran Out of Food in the Last Year: Never true   Transportation Needs: No Transportation Needs     Lack of Transportation (Medical): No     Lack of Transportation (Non-Medical): No   Physical Activity: Unknown     Days of Exercise per Week: 0 days     Minutes of Exercise per Session: Not on file   Stress: Stress Concern Present     Feeling of Stress : Rather much   Social  Connections: Unknown     Frequency of Communication with Friends and Family: More than three times a week     Frequency of Social Gatherings with Friends and Family: More than three times a week     Attends Nondenominational Services: Not on file     Active Member of Clubs or Organizations: Not on file     Attends Club or Organization Meetings: Not on file     Marital Status: Not on file   Intimate Partner Violence:      Fear of Current or Ex-Partner:      Emotionally Abused:      Physically Abused:      Sexually Abused:        FAMILY HISTORY:  Family History   Problem Relation Age of Onset     Breast Cancer Mother      Gastrointestinal Disease Mother      Heart Disease Father 57     Heart Disease Paternal Grandfather      Hypertension Maternal Grandmother      Diabetes Paternal Grandmother      Diabetes Maternal Grandfather      Cancer Sister        Past/family/social history reviewed and no changes    PHYSICAL EXAMINATION:  Constitutional: aaox3, cooperative, pleasant, not dyspneic/diaphoretic, no acute distress   LMP 05/31/2006   Wt:   Wt Readings from Last 2 Encounters:   04/27/21 80.3 kg (177 lb)   03/23/21 79.4 kg (175 lb)      Exam deferred as this was a telephone encounter due to the coronavirus outbreak.    PERTINENT STUDIES:  Most recent CBC:  Recent Labs   Lab Test 06/22/21  1427 04/27/21  1503   WBC 4.0 5.1   HGB 13.7 15.3   HCT 38.9 43.8   PLT 97* 119*     Most recent hepatic panel:  Recent Labs   Lab Test 06/22/21  1427 04/27/21  1503   ALT 52* 55*   AST 70* 72*     Most recent creatinine:  Recent Labs   Lab Test 02/23/21  1118 09/04/20  1413   CR 0.71 0.73

## 2021-08-03 NOTE — NURSING NOTE
"Chief Complaint   Patient presents with     RECHECK     Chrons disease of large intestine with fistula       Vitals:    08/03/21 1440   Weight: 80.7 kg (178 lb)   Height: 1.676 m (5' 6\")       Body mass index is 28.73 kg/m .    Shobha Mcgrath, RN, BSN      "

## 2021-08-04 ENCOUNTER — TELEPHONE (OUTPATIENT)
Dept: PHARMACY | Facility: CLINIC | Age: 56
End: 2021-08-04

## 2021-08-04 NOTE — TELEPHONE ENCOUNTER
Sheldon Serrato PA-C had inquired about weight based dosing adjustment for infliximab. [place] notes her weight has gone from about 168 lbs to 178 lbs. I spoke with [facility] infusion services. They had orders from June to increase to 800 mg per Dr. Schilling, but the June administration record still showed a dose of 760 mg. The pharmacist said he thought she got 800 mg in July, but the nursing notes still say 760 mg. He will talk to the nurse again and will make sure she gets 800 mg for her tentative 8/19 infusion.    I called [place] to provide update. Left message for return call.

## 2021-08-05 ENCOUNTER — TELEPHONE (OUTPATIENT)
Dept: GASTROENTEROLOGY | Facility: CLINIC | Age: 56
End: 2021-08-05

## 2021-08-05 NOTE — TELEPHONE ENCOUNTER
TOBYM to schedule follow up with Shakir instead of Sheldon. Left call center #, also sent a My Chart.

## 2021-08-10 ENCOUNTER — DOCUMENTATION ONLY (OUTPATIENT)
Dept: GASTROENTEROLOGY | Facility: CLINIC | Age: 56
End: 2021-08-10

## 2021-08-11 ENCOUNTER — TELEPHONE (OUTPATIENT)
Dept: FAMILY MEDICINE | Facility: CLINIC | Age: 56
End: 2021-08-11

## 2021-08-11 NOTE — TELEPHONE ENCOUNTER
Patient calling to see if she had the tetanus immunization with the pertussis.    Per chart, last Tdap was 9-4-20.  Patient informed.

## 2021-08-17 ENCOUNTER — LAB (OUTPATIENT)
Dept: LAB | Facility: CLINIC | Age: 56
End: 2021-08-17
Payer: COMMERCIAL

## 2021-08-17 DIAGNOSIS — K50.113 CROHN'S DISEASE OF LARGE INTESTINE WITH FISTULA (H): ICD-10-CM

## 2021-08-17 PROCEDURE — 83993 ASSAY FOR CALPROTECTIN FECAL: CPT

## 2021-08-18 ENCOUNTER — PATIENT OUTREACH (OUTPATIENT)
Dept: GASTROENTEROLOGY | Facility: CLINIC | Age: 56
End: 2021-08-18

## 2021-08-18 ENCOUNTER — TRANSFERRED RECORDS (OUTPATIENT)
Dept: HEALTH INFORMATION MANAGEMENT | Facility: CLINIC | Age: 56
End: 2021-08-18

## 2021-08-18 LAB — CALPROTECTIN STL-MCNT: 80.9 MG/KG (ref 0–49.9)

## 2021-08-18 NOTE — PROGRESS NOTES
Left a message for patient to call back to discuss follow up appt.   Also sent a my chart message.

## 2021-08-19 ENCOUNTER — LAB (OUTPATIENT)
Dept: LAB | Facility: CLINIC | Age: 56
End: 2021-08-19
Payer: COMMERCIAL

## 2021-08-19 DIAGNOSIS — K50.113 CROHN'S DISEASE OF LARGE INTESTINE WITH FISTULA (H): ICD-10-CM

## 2021-08-19 LAB
BASOPHILS # BLD AUTO: 0 10E3/UL (ref 0–0.2)
BASOPHILS NFR BLD AUTO: 1 %
EOSINOPHIL # BLD AUTO: 0.2 10E3/UL (ref 0–0.7)
EOSINOPHIL NFR BLD AUTO: 6 %
ERYTHROCYTE [DISTWIDTH] IN BLOOD BY AUTOMATED COUNT: 14.4 % (ref 10–15)
ERYTHROCYTE [SEDIMENTATION RATE] IN BLOOD BY WESTERGREN METHOD: 27 MM/HR (ref 0–30)
HCT VFR BLD AUTO: 40.8 % (ref 35–47)
HGB BLD-MCNC: 13.8 G/DL (ref 11.7–15.7)
IMM GRANULOCYTES # BLD: 0 10E3/UL
IMM GRANULOCYTES NFR BLD: 0 %
LYMPHOCYTES # BLD AUTO: 1.2 10E3/UL (ref 0.8–5.3)
LYMPHOCYTES NFR BLD AUTO: 31 %
MCH RBC QN AUTO: 35.8 PG (ref 26.5–33)
MCHC RBC AUTO-ENTMCNC: 33.8 G/DL (ref 31.5–36.5)
MCV RBC AUTO: 106 FL (ref 78–100)
MONOCYTES # BLD AUTO: 0.5 10E3/UL (ref 0–1.3)
MONOCYTES NFR BLD AUTO: 12 %
NEUTROPHILS # BLD AUTO: 1.9 10E3/UL (ref 1.6–8.3)
NEUTROPHILS NFR BLD AUTO: 50 %
PLATELET # BLD AUTO: 106 10E3/UL (ref 150–450)
RBC # BLD AUTO: 3.86 10E6/UL (ref 3.8–5.2)
WBC # BLD AUTO: 3.7 10E3/UL (ref 4–11)

## 2021-08-19 PROCEDURE — 36415 COLL VENOUS BLD VENIPUNCTURE: CPT

## 2021-08-19 PROCEDURE — 85025 COMPLETE CBC W/AUTO DIFF WBC: CPT

## 2021-08-19 PROCEDURE — 86140 C-REACTIVE PROTEIN: CPT

## 2021-08-19 PROCEDURE — 80076 HEPATIC FUNCTION PANEL: CPT

## 2021-08-19 PROCEDURE — 85652 RBC SED RATE AUTOMATED: CPT

## 2021-08-20 LAB
ALBUMIN SERPL-MCNC: 3.5 G/DL (ref 3.4–5)
ALP SERPL-CCNC: 97 U/L (ref 40–150)
ALT SERPL W P-5'-P-CCNC: 65 U/L (ref 0–50)
AST SERPL W P-5'-P-CCNC: 90 U/L (ref 0–45)
BILIRUB DIRECT SERPL-MCNC: 0.7 MG/DL (ref 0–0.2)
BILIRUB SERPL-MCNC: 2 MG/DL (ref 0.2–1.3)
CRP SERPL-MCNC: 4.4 MG/L (ref 0–8)
PROT SERPL-MCNC: 7.8 G/DL (ref 6.8–8.8)

## 2021-08-24 ENCOUNTER — TELEPHONE (OUTPATIENT)
Dept: FAMILY MEDICINE | Facility: CLINIC | Age: 56
End: 2021-08-24

## 2021-08-24 NOTE — TELEPHONE ENCOUNTER
Called # [phone]     Pt called, writer questioned what type of therapy and who Susanna would be. Pt noted emotional therapy or mental health type. Writer advised would need to speak with someone in central scheduling to help as writer is not able to schedule these type of visits.      Writer attempted to transfer, Pt noted if could call back tomorrow as she was busy at this time.    Writer will attempt tomorrow.     Aashish Constantino RN   [facility]

## 2021-08-24 NOTE — TELEPHONE ENCOUNTER
Patient left VM on SB5 line at 3:09pm at [facility]    Patient looking to schedule a therapy appointment with Susanna?    Please callback and assist patient.  Routing to PCP clinic.    Callback, [phone]    Thank you  Jhon MONTERROSO   - Complex Care Team

## 2021-08-26 NOTE — TELEPHONE ENCOUNTER
Routing to EA team to help with scheduling    Aashish RINCON RN   [facility] [place] Triage

## 2021-08-27 NOTE — TELEPHONE ENCOUNTER
Was notified by   Bethanie Hackett Yesterday (9:35 AM)   RB  Hi,     We do not schedule appointments for .  Patient can call them at the number listed on referral  [phone], and they will help get her scheduled with her requested provider.     Thanks!    Message text      Writer sent Validas message to advise.    Aashish RINCON RN   [facility] [place]

## 2021-08-31 ENCOUNTER — PATIENT OUTREACH (OUTPATIENT)
Dept: CARE COORDINATION | Facility: CLINIC | Age: 56
End: 2021-08-31

## 2021-08-31 NOTE — PROGRESS NOTES
Clinic Care Coordination Contact  [facility]/Voicemail       Clinical Data: Care Coordinator Outreach  Outreach attempted x 3.  Left message on patient's voicemail with call back information and requested return call.    Plan: Care Coordinator will do no further outreaches at this time.    REY Hensley  Clinic Care Coordination  [facility] : [place], [place], and [place]  Phone: [phone]

## 2021-09-02 NOTE — TELEPHONE ENCOUNTER
CHW documentation reviewed. Patient is appropriate for disenrollment from Clinic Care Coordination per standard work. CHW to complete disenrollment and send disenrollment letter. No further planned [facility] interventions at this time, however, will remain available for Care Coordination needs.     MARKUS Valencia  Clinic Care Coordinator  Ph. [phone]  [email or web address]

## 2021-09-07 NOTE — PROGRESS NOTES
Clinic Care Coordination Contact    The patient has been disenrolled from Clinic Care Coordination due to multiple unreachable attempts. I have resolved the Care Coordination Primary Care [facility] Episode, updated the CCC Status and have sent a CC'd Chart note to the PCP as an FYI.    REY Hensley  Clinic Care Coordination  [facility] : [place], [place], and [place]  Phone: [phone]

## 2021-09-10 ASSESSMENT — ENCOUNTER SYMPTOMS
ABDOMINAL PAIN: 0
DIZZINESS: 0
HEMATOCHEZIA: 0
NAUSEA: 0
CHILLS: 0
NERVOUS/ANXIOUS: 0
CONSTIPATION: 0
BREAST MASS: 0
COUGH: 0
MYALGIAS: 0
DIARRHEA: 0
FREQUENCY: 0
SORE THROAT: 0
JOINT SWELLING: 0
WEAKNESS: 0
DYSURIA: 0
PALPITATIONS: 0
PARESTHESIAS: 0
HEADACHES: 0
HEARTBURN: 0
ARTHRALGIAS: 0
EYE PAIN: 0
SHORTNESS OF BREATH: 0
FEVER: 0
HEMATURIA: 0

## 2021-09-13 ENCOUNTER — TELEPHONE (OUTPATIENT)
Dept: GASTROENTEROLOGY | Facility: CLINIC | Age: 56
End: 2021-09-13

## 2021-09-13 ENCOUNTER — VIRTUAL VISIT (OUTPATIENT)
Dept: GASTROENTEROLOGY | Facility: CLINIC | Age: 56
End: 2021-09-13
Payer: COMMERCIAL

## 2021-09-13 VITALS
HEIGHT: 66 IN | SYSTOLIC BLOOD PRESSURE: 122 MMHG | DIASTOLIC BLOOD PRESSURE: 76 MMHG | BODY MASS INDEX: 28.61 KG/M2 | WEIGHT: 178 LBS

## 2021-09-13 DIAGNOSIS — K50.113 CROHN'S DISEASE OF LARGE INTESTINE WITH FISTULA (H): Primary | ICD-10-CM

## 2021-09-13 DIAGNOSIS — K70.31 ALCOHOLIC CIRRHOSIS OF LIVER WITH ASCITES (H): Primary | ICD-10-CM

## 2021-09-13 PROCEDURE — 99213 OFFICE O/P EST LOW 20 MIN: CPT | Mod: GT | Performed by: INTERNAL MEDICINE

## 2021-09-13 ASSESSMENT — MIFFLIN-ST. JEOR: SCORE: 1409.15

## 2021-09-13 NOTE — NURSING NOTE
"Chief Complaint   Patient presents with     Follow Up     Chron's Disease        Vitals:    09/13/21 1332   BP: 122/76   Weight: 80.7 kg (178 lb)   Height: 1.676 m (5' 6\")       Body mass index is 28.73 kg/m .      Lou Cruz MA    "

## 2021-09-13 NOTE — PROGRESS NOTES
"Abiola Matute is a 56 year old female who is being evaluated via a billable video visit.      The patient has been notified of following:     \"This video visit will be conducted via a call between you and your physician/provider. We have found that certain health care needs can be provided without the need for an in-person physical exam.  This service lets us provide the care you need with a video conversation.  If a prescription is necessary we can send it directly to your pharmacy.  If lab work is needed we can place an order for that and you can then stop by our lab to have the test done at a later time.    If during the course of the call the physician/provider feels a video visit is not appropriate, you will not be charged for this service.\"     Patient confirmed that they are in [place] for today's visit Yes    Video-Visit Details  Type of service:  Video Visit    Video Start Time: 1:44 PM  Video End Time:  2:02 PM    Originating Location (pt. Location): Home    Distant Location (provider location):  [facility]     Platform used: Suma            "

## 2021-09-13 NOTE — TELEPHONE ENCOUNTER
Lab orders placed.    Talia GARCIA LPN  Hepatology Clinic      Health Call Center    Phone Message    May a detailed message be left on voicemail: yes     Reason for Call: Order(s): Other:   Reason for requested: :Labs   Date needed: 11/2/21  Provider name: Dr. Cervantes    Patient will be doing labs prior to video visit on 11/2/21 with Dr. Cervantes at [facility].     Please advise     Action Taken: Other:  HEPATOLOGY    Travel Screening: Not Applicable

## 2021-09-13 NOTE — PROGRESS NOTES
IBD CLINIC VISIT    CC/REFERRING MD:  Referred Self  REASON FOR CONSULTATION: Crohn's.     ASSESSMENT/PLAN:  56 year old female with cirrhosis and Crohn's    1.  Crohn's disease:   Current Medications:   - Infliximab - started May 2019. Dose increase to 10 mg/kg every 8 weeks but had continued active disease, dose increase to 10 mg/kg every 4 weeks 11/2020.  Current disease activity:   Last endoscopic disease activity: 3/1/21: Normal flex sig.     Overall doing well.  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  80% of the month she feels well covered without any major symptoms.  About a week before her infusions, she will have some more frequent stools and opening up of a small perianal fistula or anal fissure.  This resolves again within a few days of her infusion.  Similarly she also has some canker sores in her mouth the week before infusion that again go away after the infusion.    We discussed that this may be her best clinical place to get her.  Certainly at 4 weeks her trough concentrations are adequate and I do not think she would benefit from a higher dose of infliximab.  She has had a benefit in terms of her perianal disease, I am not 100% convinced that her symptoms are breakthrough Crohn's, although the timing is certainly suggestive.  For now we will plan to monitor her.  I think her risk of complication is low, and most recent endoscopic evaluation with normal mucosa.     -- Continue infliximab 10mg/kg every 4 weeks.   -- Consider repeat MRI pelvis in December / January 2. Cirrhosis: Some notes state cryptogenic, some notes state alcoholic. Recent variceal screening with EGD was unremarkable.    -- Due for follow-up with Liver clinic - she will call to schedule.     Cancer Screening:  Colon cancer screening: Does not clearly have colonic disease. Tentative plan for repeat colonoscopy in 2024 or 2025.     Misc:  -- Avoid tobacco use  -- Avoid NSAIDs as there is potentially a 25% chance  of causing an IBD flare    Return to clinic in 6 months    Thank you for this consultation.  It was a pleasure to participate in the care of this patient; please contact us with any further questions.     This note was created with voice recognition software, and while reviewed for accuracy, typos may remain.     Farhan Schilling MD MS    [facility]  Inflammatory Bowel Disease Program  Division of Gastroenterology, Hepatology and Nutrition  Pager: [phone]    IBD HISTORY  Age at diagnosis: 53 (4/2019)  Extent of disease: miguel angel-anal, anal  Disease phenotype: Miguel Angel-anal fistula  Miguel Angel-anal disease: Yes  Prior IBD surgeries: No. Seton placements  Prior IBD Medications:    DRUG MONITORING  TPMT enzyme activity:     6-TGN/6-MMPN levels:    Biologic concentration:  10/2/2019: IFX 0.8, anti-IFX antibodies: 451 (Esoterix)  1/22/2020 infliximab level 2.3, no antibodies (this is an 8-week trough at 10 mg/kg)  3/4/21 IFX 25.1, no antibodies (4 week trough on 10mg/kg)    sIBDQ:   No flowsheet data found.    HPI:   Here today for follow-up.   No specific questions. Overall, she is feeling OK.   She changed from Remicade to inflectra. Also had some weight gain and so is on a higher dose.     She still has some issues - particularly canker sores, weeping from her umbilicus, and a small fissure/fistala that opens up at her tailbone. She has setons in place with minimal drainage.     This typically occurs one week before infusion and then self resolves a few days after her infusion.     She reports at best 3 stools a day and at worst 8 stools a day. Typically worse when closer to infusion. Over the course of a month, 80% is spent having good days and 20% is bad. No blood in her stools.     ROS:    Constitutional, HEENT, cardiovascular, pulmonary, GI, , musculoskeletal, neuro, skin, endocrine and psych systems are negative, except as otherwise noted.     PERTINENT PAST MEDICAL HISTORY:  Past Medical History:    Diagnosis Date     Alcohol abuse      Alcoholic cirrhosis of liver with ascites      Atypical ductal hyperplasia of breast 9/10/10    ERT not recommended -left - and flat epithelial atypia-scheduled for breast biopsy 9/17/2010      Bifid uvula      Cholelithiasis      Contact perianal dermatitis and other eczema     recurrent - clobetasol      Crohn disease      Fear of flying      - gets Ativan prn.      Hypertension      IBS (irritable bowel syndrome)        PREVIOUS SURGERIES:  Past Surgical History:   Procedure Laterality Date     BIOPSY ANAL N/A 3/28/2019    anal biopsy and culure placement of seton - Dr Fleming     BIOPSY BREAST Left 09/17/2010    - scheduled with Dr. Varma      BIOPSY LIVER  2019     C APPENDECTOMY  at age 14     COLONOSCOPY  2006     COLONOSCOPY N/A 12/23/2014    Procedure: COMBINED COLONOSCOPY, SINGLE OR MULTIPLE BIOPSY/POLYPECTOMY BY BIOPSY;  Surgeon: Diane Fleming MD;  Location:  GI     COLONOSCOPY N/A 12/5/2019    Procedure: COLONOSCOPY, WITH POLYPECTOMY AND BIOPSY;  Surgeon: Farhan Schilling MD;  [facility]     ENDOSCOPIC RETROGRADE CHOLANGIOPANCREATOGRAM N/A 4/24/2020    Procedure: ENDOSCOPIC RETROGRADE CHOLANGIOPANCREATOGRAPHY WITH, sledge removal,sphincterotomy, stent in gallbladder and pancreatic duct stent, and balloon dilation;  Surgeon: Guru Isac Kraft MD;  Location:  OR     ESOPHAGOSCOPY, GASTROSCOPY, DUODENOSCOPY (EGD), COMBINED N/A 12/5/2019    Procedure: ESOPHAGOGASTRODUODENOSCOPY (EGD);  Surgeon: Farhan Schilling MD;  [facility]: [facility]     EXAM UNDER ANESTHESIA ANUS N/A 3/28/2019    Procedure: EXAM UNDER ANESTHESIA ANUS;  Surgeon: Diane Fleming MD;  Location:  OR     EXAM UNDER ANESTHESIA ANUS N/A 2/26/2021    Procedure: EXAM UNDER ANESTHESIA OF ANUS, SETON PLACEMENT, EXCISION OF SKIN BRIDGE;  Surgeon: Avtar Nam MD;  Location: [facility] OR     HYSTERECTOMY, VAGINAL  2006    with Dr. Licha Zhou - with BSO for  fibroids      IR GALLBLADDER DRAIN PLACEMENT  4/22/2020     OPEN REDUCTION INTERNAL FIXATION ANKLE Left at age 27    plates and screws removed at age 36     Pelviscopy with removal of bilateral hydrosalpinges.  04/15/2010       PREVIOUS ENDOSCOPY:  3/7/19: Flex sig: Colon and rectum appeared normal. 2 large deep ulcers extending from the anal cnaal to left perianal area.     10/23/18: Colonoscopy: Endoscopically normal ileum.  Mild pan colonic congestion with contact friability thought to be related to portal hypertension and thrombocytopenia.  Prior anal verge ulcer is healed    12/23/14: Colonoscopy: Perianal skin tag noted.  Ileum normal, colon normal.    ALLERGIES:     Allergies   Allergen Reactions     Fish Oil      Redness and itching around eye area only - went away when fish oil capsules stopped      Metronidazole      pain/itching     Naphthalenemethylamines      Lamisil = mild urticarial reaction     Ppd [Tuberculin Purified Protein Derivative]      Sulfa Drugs      hives       PERTINENT MEDICATIONS:    Current Outpatient Medications:      acetaminophen (TYLENOL) 325 MG tablet, Take 1-2 tablets (325-650 mg) by mouth every 6 hours as needed for mild pain Take either 325mg every 6 hours or 650mg every 8 hours for pain. Do not exceed 2000mg in 24 hours., Disp: 100 tablet, Rfl: 0     furosemide (LASIX) 40 MG tablet, TAKE 1 TABLET BY MOUTH  DAILY, Disp: 90 tablet, Rfl: 3     inFLIXimab (REMICADE) 100 MG injection, Inject into the vein once for 1 dose, Disp:  , Rfl:      Milk Thistle-Dand-Fennel-Licor (MILK THISTLE XTRA) CAPS capsule, Take 1 capsule by mouth daily, Disp: , Rfl:      Multiple Vitamins-Minerals (MULTIVITAMIN & MINERAL PO), Take  by mouth daily., Disp: , Rfl:      spironolactone (ALDACTONE) 100 MG tablet, TAKE 1 TABLET BY MOUTH  DAILY, Disp: 90 tablet, Rfl: 3    SOCIAL HISTORY:  Social History     Socioeconomic History     Marital status:      Spouse name: Albino     Number of children: 3      Years of education: 14     Highest education level: Not on file   Occupational History     Occupation: [employer]     Comment:    Tobacco Use     Smoking status: Never Smoker     Smokeless tobacco: Never Used   Substance and Sexual Activity     Alcohol use: Not Currently     Drug use: No     Comment: no herbal meds either     Sexual activity: Yes     Partners: Male     Birth control/protection: Surgical     Comment: harper had vasectomy   Other Topics Concern      Service No     Blood Transfusions No     Caffeine Concern No     Comment: rarely drinks caffeine     Occupational Exposure Not Asked     Hobby Hazards Not Asked     Sleep Concern Not Asked     Stress Concern Not Asked     Weight Concern Not Asked     Special Diet Not Asked     Back Care Not Asked     Exercise Yes     Comment: does a lot of walking - 4x/week     Bike Helmet Not Asked     Seat Belt Yes     Comment: always     Self-Exams Yes     Comment: SBE encouraged monthly     Parent/sibling w/ CABG, MI or angioplasty before 65F 55M? Yes   Social History Narrative    calcium - drinks 5-6 large glasses skim milk/day    flex sig/colonoscopy -at age 49    sun precautions - discussed    mammogram - needs every 2 years in her 30's, then yearly from then on    Td booster - 9/99 and 4/27/2010    pneumovax -at age 59    DEXA -when perimenopausal    stool hemoccults - every year after age 40    ASA- start at age 40    mulvitamin - encouraged     Social Determinants of Health     Financial Resource Strain: Low Risk      Difficulty of Paying Living Expenses: Not very hard   Food Insecurity: No Food Insecurity     Worried About Running Out of Food in the Last Year: Never true     Ran Out of Food in the Last Year: Never true   Transportation Needs: No Transportation Needs     Lack of Transportation (Medical): No     Lack of Transportation (Non-Medical): No   Physical Activity: Unknown     Days of Exercise per Week: 0 days      "Minutes of Exercise per Session: Not on file   Stress: Stress Concern Present     Feeling of Stress : Rather much   Social Connections: Unknown     Frequency of Communication with Friends and Family: More than three times a week     Frequency of Social Gatherings with Friends and Family: More than three times a week     Attends Adventism Services: Not on file     Active Member of Clubs or Organizations: Not on file     Attends Club or Organization Meetings: Not on file     Marital Status: Not on file   Intimate Partner Violence:      Fear of Current or Ex-Partner:      Emotionally Abused:      Physically Abused:      Sexually Abused:        FAMILY HISTORY:  Family History   Problem Relation Age of Onset     Breast Cancer Mother      Gastrointestinal Disease Mother      Heart Disease Father 57     Heart Disease Paternal Grandfather      Hypertension Maternal Grandmother      Diabetes Paternal Grandmother      Diabetes Maternal Grandfather      Cancer Sister        Past/family/social history reviewed and no changes    PHYSICAL EXAMINATION:  Constitutional: aaox3, cooperative, pleasant, not dyspneic/diaphoretic, no acute distress   /76   Ht 1.676 m (5' 6\")   Wt 80.7 kg (178 lb)   LMP 05/31/2006   BMI 28.73 kg/m    Wt:   Wt Readings from Last 2 Encounters:   09/13/21 80.7 kg (178 lb)   08/03/21 80.7 kg (178 lb)      Constitutional - general appearance is well and in no acute distress. Body habitus normal  Eyes - No redness or discharge  Respiratory - No cough, unlabored breathing  Musculoskeletal - range of motion intact: Neck and arms  Skin - No discoloration or lesions  Neurological - No tremors, headaches  Psychiatric - No anxiety, alert & oriented   "

## 2021-09-13 NOTE — PATIENT INSTRUCTIONS
It was a pleasure taking care of you today. I've included a brief summary of our discussion and care plan from today's visit below.  Please review this information with your primary care provider.  _______________________________________________________________________    My recommendations are summarized as follows:    -- Continue Infliximab at current dose    Return to GI Clinic in 6 months to review your progress.     If you need any follow-up appointments, please use the following phone numbers below.    To schedule or reschedule a follow-up GI appointment, call [phone] option 1    To schedule your endoscopy procedure, call [phone] option 2    To schedule imaging, please call [phone]     To schedule your lab appointment at [address] floor lab call [phone]. Call your [place] lab directly if it is not [facility]. If you use a non-[place] lab, please let us know where to fax your lab order (call Gregoria at [phone] or Diane at [phone] for IBD patients.      _______________________________________________________________________    Please be in touch if there are any further questions that arise following today's visit.  There are multiple ways to contact your gastroenterology care team.      During business hours, you may reach your gastroenterology RN Care Coordinator. For IBD related questions, please call Diane at [phone]. For general GI questions, please call Gregoria at [phone].      You can always send a secure message through [employer]. [employer] messages are answered by your nurse or doctor typically within 24 hours. Please allow extra time on weekends and holidays.     What is [employer]?  [employer] is a secure way for you to access all of your healthcare records from the [facility].  It is a web based computer program, so you can sign on to it from any location.  It also allows you to send secure messages to your care team.  I  recommend signing up for [employer] access if you have not already done so and are comfortable with using a computer.     For urgent/emergent questions after business hours, you may reach the on-call GI Fellow by contacting the [facility]  at [phone].     How will I get the results of any tests ordered?    You will receive all of your results.  If you have signed up for [employer], any tests ordered at your visit will be available to you after your physician reviews them.  Typically this takes 1-2 weeks.  If there are urgent results that require a change in your care plan, your physician or nurse will call you to discuss the next steps.      Thank you for choosing [facility]!       Sincerely,    Farhan Schilling MD  [facility]  Division of Gastroenterology

## 2021-09-15 ENCOUNTER — OFFICE VISIT (OUTPATIENT)
Dept: FAMILY MEDICINE | Facility: CLINIC | Age: 56
End: 2021-09-15
Payer: COMMERCIAL

## 2021-09-15 VITALS
TEMPERATURE: 98 F | HEIGHT: 66 IN | WEIGHT: 180 LBS | BODY MASS INDEX: 28.93 KG/M2 | SYSTOLIC BLOOD PRESSURE: 110 MMHG | OXYGEN SATURATION: 98 % | DIASTOLIC BLOOD PRESSURE: 72 MMHG | HEART RATE: 87 BPM

## 2021-09-15 DIAGNOSIS — K50.119 CROHN'S DISEASE OF PERIANAL REGION WITH COMPLICATION (H): ICD-10-CM

## 2021-09-15 DIAGNOSIS — Z13.220 SCREENING FOR LIPID DISORDERS: ICD-10-CM

## 2021-09-15 DIAGNOSIS — K50.113 CROHN'S DISEASE OF LARGE INTESTINE WITH FISTULA (H): ICD-10-CM

## 2021-09-15 DIAGNOSIS — Z00.00 ROUTINE GENERAL MEDICAL EXAMINATION AT A HEALTH CARE FACILITY: Primary | ICD-10-CM

## 2021-09-15 DIAGNOSIS — I10 ESSENTIAL HYPERTENSION WITH GOAL BLOOD PRESSURE LESS THAN 140/90: ICD-10-CM

## 2021-09-15 LAB
BASOPHILS # BLD AUTO: 0 10E3/UL (ref 0–0.2)
BASOPHILS NFR BLD AUTO: 1 %
CRP SERPL-MCNC: 5 MG/L (ref 0–8)
EOSINOPHIL # BLD AUTO: 0.2 10E3/UL (ref 0–0.7)
EOSINOPHIL NFR BLD AUTO: 5 %
ERYTHROCYTE [DISTWIDTH] IN BLOOD BY AUTOMATED COUNT: 13.3 % (ref 10–15)
ERYTHROCYTE [SEDIMENTATION RATE] IN BLOOD BY WESTERGREN METHOD: 48 MM/HR (ref 0–30)
HCT VFR BLD AUTO: 39.3 % (ref 35–47)
HGB BLD-MCNC: 13.6 G/DL (ref 11.7–15.7)
IMM GRANULOCYTES # BLD: 0 10E3/UL
IMM GRANULOCYTES NFR BLD: 0 %
LYMPHOCYTES # BLD AUTO: 1.2 10E3/UL (ref 0.8–5.3)
LYMPHOCYTES NFR BLD AUTO: 29 %
MCH RBC QN AUTO: 35.5 PG (ref 26.5–33)
MCHC RBC AUTO-ENTMCNC: 34.6 G/DL (ref 31.5–36.5)
MCV RBC AUTO: 103 FL (ref 78–100)
MONOCYTES # BLD AUTO: 0.4 10E3/UL (ref 0–1.3)
MONOCYTES NFR BLD AUTO: 9 %
NEUTROPHILS # BLD AUTO: 2.4 10E3/UL (ref 1.6–8.3)
NEUTROPHILS NFR BLD AUTO: 56 %
PLATELET # BLD AUTO: 137 10E3/UL (ref 150–450)
RBC # BLD AUTO: 3.83 10E6/UL (ref 3.8–5.2)
WBC # BLD AUTO: 4.2 10E3/UL (ref 4–11)

## 2021-09-15 PROCEDURE — 84075 ASSAY ALKALINE PHOSPHATASE: CPT | Performed by: NURSE PRACTITIONER

## 2021-09-15 PROCEDURE — 99214 OFFICE O/P EST MOD 30 MIN: CPT | Mod: 25 | Performed by: NURSE PRACTITIONER

## 2021-09-15 PROCEDURE — 84155 ASSAY OF PROTEIN SERUM: CPT | Performed by: NURSE PRACTITIONER

## 2021-09-15 PROCEDURE — 84460 ALANINE AMINO (ALT) (SGPT): CPT | Performed by: NURSE PRACTITIONER

## 2021-09-15 PROCEDURE — 82043 UR ALBUMIN QUANTITATIVE: CPT | Performed by: NURSE PRACTITIONER

## 2021-09-15 PROCEDURE — 85652 RBC SED RATE AUTOMATED: CPT | Performed by: NURSE PRACTITIONER

## 2021-09-15 PROCEDURE — 80053 COMPREHEN METABOLIC PANEL: CPT | Performed by: NURSE PRACTITIONER

## 2021-09-15 PROCEDURE — 84450 TRANSFERASE (AST) (SGOT): CPT | Performed by: NURSE PRACTITIONER

## 2021-09-15 PROCEDURE — 36415 COLL VENOUS BLD VENIPUNCTURE: CPT | Performed by: NURSE PRACTITIONER

## 2021-09-15 PROCEDURE — 86140 C-REACTIVE PROTEIN: CPT | Performed by: NURSE PRACTITIONER

## 2021-09-15 PROCEDURE — 90471 IMMUNIZATION ADMIN: CPT | Performed by: NURSE PRACTITIONER

## 2021-09-15 PROCEDURE — 82248 BILIRUBIN DIRECT: CPT | Performed by: NURSE PRACTITIONER

## 2021-09-15 PROCEDURE — 99396 PREV VISIT EST AGE 40-64: CPT | Mod: 25 | Performed by: NURSE PRACTITIONER

## 2021-09-15 PROCEDURE — 85025 COMPLETE CBC W/AUTO DIFF WBC: CPT | Performed by: NURSE PRACTITIONER

## 2021-09-15 PROCEDURE — 80061 LIPID PANEL: CPT | Performed by: NURSE PRACTITIONER

## 2021-09-15 PROCEDURE — 82247 BILIRUBIN TOTAL: CPT | Performed by: NURSE PRACTITIONER

## 2021-09-15 PROCEDURE — 90682 RIV4 VACC RECOMBINANT DNA IM: CPT | Performed by: NURSE PRACTITIONER

## 2021-09-15 ASSESSMENT — ENCOUNTER SYMPTOMS
NERVOUS/ANXIOUS: 0
ARTHRALGIAS: 0
MYALGIAS: 0
WEAKNESS: 0
PARESTHESIAS: 0
JOINT SWELLING: 0
HEMATOCHEZIA: 0
COUGH: 0
BREAST MASS: 0
HEADACHES: 0
NAUSEA: 0
ABDOMINAL PAIN: 0
DIARRHEA: 0
PALPITATIONS: 0
CHILLS: 0
FREQUENCY: 0
EYE PAIN: 0
HEARTBURN: 0
CONSTIPATION: 0
DYSURIA: 0
HEMATURIA: 0
DIZZINESS: 0
FEVER: 0
SORE THROAT: 0
SHORTNESS OF BREATH: 0

## 2021-09-15 ASSESSMENT — MIFFLIN-ST. JEOR: SCORE: 1418.22

## 2021-09-15 NOTE — PROGRESS NOTES
SUBJECTIVE:   CC: Abiola Matute is an 56 year old woman who presents for preventive health visit.     Patient has been advised of split billing requirements and indicates understanding: Yes  Healthy Habits:     Getting at least 3 servings of Calcium per day:  Yes    Bi-annual eye exam:  Yes    Dental care twice a year:  Yes    Sleep apnea or symptoms of sleep apnea:  None    Diet:  Other    Frequency of exercise:  2-3 days/week    Duration of exercise:  15-30 minutes    Taking medications regularly:  Yes    Medication side effects:  None    PHQ-2 Total Score: 0    Additional concerns today:  No         Today's PHQ-2 Score:   PHQ-2 ( 1999 Pfizer) 9/10/2021   Q1: Little interest or pleasure in doing things 0   Q2: Feeling down, depressed or hopeless 0   PHQ-2 Score 0   Q1: Little interest or pleasure in doing things Not at all   Q2: Feeling down, depressed or hopeless Not at all   PHQ-2 Score 0       Abuse: Current or Past (Physical, Sexual or Emotional) - No  Do you feel safe in your environment? Yes      Social History     Tobacco Use     Smoking status: Never Smoker     Smokeless tobacco: Never Used   Substance Use Topics     Alcohol use: Not Currently     If you drink alcohol do you typically have >3 drinks per day or >7 drinks per week? No    Alcohol Use 9/15/2021   Prescreen: >3 drinks/day or >7 drinks/week? -   Prescreen: >3 drinks/day or >7 drinks/week? No       Reviewed orders with patient.  Reviewed health maintenance and updated orders accordingly - Yes  BP Readings from Last 3 Encounters:   09/15/21 110/72   09/13/21 122/76   04/27/21 118/76    Wt Readings from Last 3 Encounters:   09/15/21 81.6 kg (180 lb)   09/13/21 80.7 kg (178 lb)   08/03/21 80.7 kg (178 lb)                  Patient Active Problem List   Diagnosis     Non morbid obesity due to excess calories     Other isolated or specific phobias     Other congenital malformations of mouth     Contact dermatitis and other eczema, due to  unspecified cause     Intestinal infection due to Clostridium difficile     Iron deficiency anemia, unspecified iron deficiency anemia type     Rosacea     Atypical ductal hyperplasia of left breast     Idiopathic thrombocytopenia (H)     Postmenopausal- s/p hysterectomy with BSO - not on HRT secondary to atypical ductal hyperplasia & breast pain -no paps needed     Claustrophobia     S/P total hysterectomy and bilateral salpingo-oophorectomy     Abnormal liver enzymes- ? related to ongoing etoh use/abuse     Essential hypertension with goal blood pressure less than 140/90     Gastroenteritis     Stool incontinence     CARDIOVASCULAR SCREENING; LDL GOAL LESS THAN 130     AA (alcohol abuse) - ? ongoing      Alcoholic fatty liver     Acquired hyperbilirubinemia- ? secondary to alcohol use     Diffuse nodular cirrhosis of liver (H)     Severe Perianal Crohn's Disease     Biliary colic     Cholecystitis     Alcoholic cirrhosis of liver with ascites (H) - seeing [facility]      Abscess of anal or rectal region     Past Surgical History:   Procedure Laterality Date     BIOPSY ANAL N/A 3/28/2019    anal biopsy and culure placement of seton - Dr Fleming     BIOPSY BREAST Left 09/17/2010    - scheduled with Dr. Varma      BIOPSY LIVER  2019     C APPENDECTOMY  at age 14     COLONOSCOPY  2006     COLONOSCOPY N/A 12/23/2014    Procedure: COMBINED COLONOSCOPY, SINGLE OR MULTIPLE BIOPSY/POLYPECTOMY BY BIOPSY;  Surgeon: Diane Fleming MD;  Location:  GI     COLONOSCOPY N/A 12/5/2019    Procedure: COLONOSCOPY, WITH POLYPECTOMY AND BIOPSY;  Surgeon: Farhan Schilling MD;  Location:  GI     ENDOSCOPIC RETROGRADE CHOLANGIOPANCREATOGRAM N/A 4/24/2020    Procedure: ENDOSCOPIC RETROGRADE CHOLANGIOPANCREATOGRAPHY WITH, sledge removal,sphincterotomy, stent in gallbladder and pancreatic duct stent, and balloon dilation;  Surgeon: Guru Isac Kraft MD;  Location:  OR     ESOPHAGOSCOPY, GASTROSCOPY,  DUODENOSCOPY (EGD), COMBINED N/A 12/5/2019    Procedure: ESOPHAGOGASTRODUODENOSCOPY (EGD);  Surgeon: Farhan Schilling MD;  Location: [facility]     EXAM UNDER ANESTHESIA ANUS N/A 3/28/2019    Procedure: EXAM UNDER ANESTHESIA ANUS;  Surgeon: Diane Fleming MD;  Location:  OR     EXAM UNDER ANESTHESIA ANUS N/A 2/26/2021    Procedure: EXAM UNDER ANESTHESIA OF ANUS, SETON PLACEMENT, EXCISION OF SKIN BRIDGE;  Surgeon: Avtar Nam MD;  Location: [facility] [place]     HYSTERECTOMY, VAGINAL  2006    with Dr. Licha Zhou - with BSO for fibroids      IR GALLBLADDER DRAIN PLACEMENT  4/22/2020     OPEN REDUCTION INTERNAL FIXATION ANKLE Left at age 27    plates and screws removed at age 36     Pelviscopy with removal of bilateral hydrosalpinges.  04/15/2010       Social History     Tobacco Use     Smoking status: Never Smoker     Smokeless tobacco: Never Used   Substance Use Topics     Alcohol use: Not Currently     Family History   Problem Relation Age of Onset     Breast Cancer Mother      Gastrointestinal Disease Mother      Heart Disease Father 57     Heart Disease Paternal Grandfather      Hypertension Maternal Grandmother      Diabetes Paternal Grandmother      Diabetes Maternal Grandfather      Cancer Sister          Current Outpatient Medications   Medication Sig Dispense Refill     acetaminophen (TYLENOL) 325 MG tablet Take 1-2 tablets (325-650 mg) by mouth every 6 hours as needed for mild pain Take either 325mg every 6 hours or 650mg every 8 hours for pain. Do not exceed 2000mg in 24 hours. 100 tablet 0     furosemide (LASIX) 40 MG tablet TAKE 1 TABLET BY MOUTH  DAILY 90 tablet 3     inFLIXimab (REMICADE) 100 MG injection Inject into the vein once for 1 dose       Milk Thistle-Dand-Fennel-Licor (MILK THISTLE XTRA) CAPS capsule Take 1 capsule by mouth daily       Multiple Vitamins-Minerals (MULTIVITAMIN & MINERAL PO) Take  by mouth daily.       spironolactone (ALDACTONE) 100 MG tablet TAKE 1 TABLET BY MOUTH  DAILY 90  tablet 3       Breast Cancer Screening:    FHS-7:   Breast CA Risk Assessment (FHS-7) 9/10/2021   Did any of your first-degree relatives have breast or ovarian cancer? Yes   Did any of your relatives have bilateral breast cancer? No   Did any man in your family have breast cancer? No   Did any woman in your family have breast and ovarian cancer? Yes   Did any woman in your family have breast cancer before age 50 y? No   Do you have 2 or more relatives with breast and/or ovarian cancer? No   Do you have 2 or more relatives with breast and/or bowel cancer? No       Mammogram Screening: Recommended mammography every 1-2 years with patient discussion and risk factor consideration  Pertinent mammograms are reviewed under the imaging tab.    History of abnormal Pap smear: Status post benign hysterectomy. Health Maintenance and Surgical History updated.    PAP / HPV 9/19/2011 8/24/2010 12/6/2005   PAP (Historical) NIL ASC-US(A) ASC-US(A)     Reviewed and updated as needed this visit by clinical staff  Tobacco  Allergies  Meds              Reviewed and updated as needed this visit by Provider    Meds             Past Medical History:   Diagnosis Date     Alcohol abuse      Alcoholic cirrhosis of liver with ascites      Atypical ductal hyperplasia of breast 9/10/10    ERT not recommended -left - and flat epithelial atypia-scheduled for breast biopsy 9/17/2010      Bifid uvula      Cholelithiasis      Contact perianal dermatitis and other eczema     recurrent - clobetasol      Crohn disease      Fear of flying      - gets Ativan prn.      Hypertension      IBS (irritable bowel syndrome)       Past Surgical History:   Procedure Laterality Date     BIOPSY ANAL N/A 3/28/2019    anal biopsy and culure placement of seton - Dr Fleming     BIOPSY BREAST Left 09/17/2010    - scheduled with Dr. Varma      BIOPSY LIVER  2019     C APPENDECTOMY  at age 14     COLONOSCOPY  2006     COLONOSCOPY N/A 12/23/2014    Procedure: COMBINED  COLONOSCOPY, SINGLE OR MULTIPLE BIOPSY/POLYPECTOMY BY BIOPSY;  Surgeon: Diane Fleming MD;  Location:  GI     COLONOSCOPY N/A 12/5/2019    Procedure: COLONOSCOPY, WITH POLYPECTOMY AND BIOPSY;  Surgeon: Farhan Schilling MD;  Location: [facility]     ENDOSCOPIC RETROGRADE CHOLANGIOPANCREATOGRAM N/A 4/24/2020    Procedure: ENDOSCOPIC RETROGRADE CHOLANGIOPANCREATOGRAPHY WITH, sledge removal,sphincterotomy, stent in gallbladder and pancreatic duct stent, and balloon dilation;  Surgeon: Guru Isac Kraft MD;  Location: [facility] OR     ESOPHAGOSCOPY, GASTROSCOPY, DUODENOSCOPY (EGD), COMBINED N/A 12/5/2019    Procedure: ESOPHAGOGASTRODUODENOSCOPY (EGD);  Surgeon: Farhan Schilling MD;  Location: [facility]     EXAM UNDER ANESTHESIA ANUS N/A 3/28/2019    Procedure: EXAM UNDER ANESTHESIA ANUS;  Surgeon: Diane Fleming MD;  Location:  OR     EXAM UNDER ANESTHESIA ANUS N/A 2/26/2021    Procedure: EXAM UNDER ANESTHESIA OF ANUS, SETON PLACEMENT, EXCISION OF SKIN BRIDGE;  Surgeon: Avtar Nam MD;  [place] [facility] [place]     HYSTERECTOMY, VAGINAL  2006    with Dr. Licha Zhou - with BSO for fibroids      IR GALLBLADDER DRAIN PLACEMENT  4/22/2020     OPEN REDUCTION INTERNAL FIXATION ANKLE Left at age 27    plates and screws removed at age 36     Pelviscopy with removal of bilateral hydrosalpinges.  04/15/2010       Review of Systems   Constitutional: Negative for chills and fever.   HENT: Negative for congestion, ear pain, hearing loss and sore throat.    Eyes: Negative for pain and visual disturbance.   Respiratory: Negative for cough and shortness of breath.    Cardiovascular: Negative for chest pain, palpitations and peripheral edema.   Gastrointestinal: Negative for abdominal pain, constipation, diarrhea, heartburn, hematochezia and nausea.   Breasts:  Negative for tenderness, breast mass and discharge.   Genitourinary: Negative for dysuria, frequency, genital sores, hematuria, pelvic pain, urgency,  "vaginal bleeding and vaginal discharge.   Musculoskeletal: Negative for arthralgias, joint swelling and myalgias.   Skin: Negative for rash.   Neurological: Negative for dizziness, weakness, headaches and paresthesias.   Psychiatric/Behavioral: Negative for mood changes. The patient is not nervous/anxious.            OBJECTIVE:   /72 (BP Location: Left arm, Cuff Size: Adult Regular)   Pulse 87   Temp 98  F (36.7  C) (Tympanic)   Ht 1.676 m (5' 6\")   Wt 81.6 kg (180 lb)   LMP 05/31/2006   SpO2 98%   BMI 29.05 kg/m    Physical Exam  GENERAL: healthy, alert and no distress  EYES: Eyes grossly normal to inspection, PERRL and conjunctivae and sclerae normal  HENT: ear canals and TM's normal, nose and mouth without ulcers or lesions  NECK: no adenopathy, no asymmetry, masses, or scars and thyroid normal to palpation  RESP: lungs clear to auscultation - no rales, rhonchi or wheezes  CV: regular rate and rhythm, normal S1 S2, no S3 or S4, no murmur, click or rub, no peripheral edema and peripheral pulses strong  ABDOMEN: soft, nontender, no hepatosplenomegaly, no masses and bowel sounds normal  MS: no gross musculoskeletal defects noted, no edema  SKIN: no suspicious lesions or rashes  NEURO: Normal strength and tone, mentation intact and speech normal  PSYCH: mentation appears normal, affect normal/bright      ASSESSMENT/PLAN:     Abiola was seen today for physical.    Diagnoses and all orders for this visit:    Routine general medical examination at a health care facility  -     REVIEW OF HEALTH MAINTENANCE PROTOCOL ORDERS    Screening for lipid disorders  -     Lipid panel reflex to direct LDL Fasting    Crohn's disease of perianal region with complication (H)  Crohn's disease of large intestine with fistula (H)  Followed by [facility] - Dr. Schilling  -     Comprehensive metabolic panel (BMP + Alb, Alk Phos, ALT, AST, Total. Bili, TP)  -     CBC with platelets differential  -     Hepatic panel  -     " "Erythrocyte sedimentation rate auto     Essential hypertension with goal blood pressure less than 140/90  Currently well controlled.    -     Albumin Random Urine Quantitative with Creat Ratio    Other orders  -     INFLUENZA QUAD, RECOMBINANT, P-FREE (RIV4) (FLUBLOK)      COUNSELING:  Reviewed preventive health counseling, as reflected in patient instructions    Estimated body mass index is 29.05 kg/m  as calculated from the following:    Height as of this encounter: 1.676 m (5' 6\").    Weight as of this encounter: 81.6 kg (180 lb).        She reports that she has never smoked. She has never used smokeless tobacco.      Counseling Resources:  ATP IV Guidelines  Pooled Cohorts Equation Calculator  Breast Cancer Risk Calculator  BRCA-Related Cancer Risk Assessment: FHS-7 Tool  FRAX Risk Assessment  ICSI Preventive Guidelines  Dietary Guidelines for Americans, 2010  USDA's MyPlate  ASA Prophylaxis  Lung CA Screening    Linda Macdonald, DARYN [facility]  "

## 2021-09-16 LAB
ALBUMIN SERPL-MCNC: 3.3 G/DL (ref 3.4–5)
ALBUMIN SERPL-MCNC: 3.4 G/DL (ref 3.4–5)
ALP SERPL-CCNC: 101 U/L (ref 40–150)
ALP SERPL-CCNC: 96 U/L (ref 40–150)
ALT SERPL W P-5'-P-CCNC: 51 U/L (ref 0–50)
ALT SERPL W P-5'-P-CCNC: 53 U/L (ref 0–50)
ANION GAP SERPL CALCULATED.3IONS-SCNC: 4 MMOL/L (ref 3–14)
AST SERPL W P-5'-P-CCNC: 68 U/L (ref 0–45)
AST SERPL W P-5'-P-CCNC: 68 U/L (ref 0–45)
BILIRUB DIRECT SERPL-MCNC: 0.7 MG/DL (ref 0–0.2)
BILIRUB SERPL-MCNC: 1.8 MG/DL (ref 0.2–1.3)
BILIRUB SERPL-MCNC: 1.8 MG/DL (ref 0.2–1.3)
BUN SERPL-MCNC: 13 MG/DL (ref 7–30)
CALCIUM SERPL-MCNC: 8 MG/DL (ref 8.5–10.1)
CHLORIDE BLD-SCNC: 104 MMOL/L (ref 94–109)
CHOLEST SERPL-MCNC: 117 MG/DL
CO2 SERPL-SCNC: 26 MMOL/L (ref 20–32)
CREAT SERPL-MCNC: 0.69 MG/DL (ref 0.52–1.04)
FASTING STATUS PATIENT QL REPORTED: YES
GFR SERPL CREATININE-BSD FRML MDRD: >90 ML/MIN/1.73M2
GLUCOSE BLD-MCNC: 89 MG/DL (ref 70–99)
HDLC SERPL-MCNC: 41 MG/DL
LDLC SERPL CALC-MCNC: 64 MG/DL
NONHDLC SERPL-MCNC: 76 MG/DL
POTASSIUM BLD-SCNC: 3.8 MMOL/L (ref 3.4–5.3)
PROT SERPL-MCNC: 7.9 G/DL (ref 6.8–8.8)
PROT SERPL-MCNC: 8.2 G/DL (ref 6.8–8.8)
SODIUM SERPL-SCNC: 134 MMOL/L (ref 133–144)
TRIGL SERPL-MCNC: 62 MG/DL

## 2021-09-17 LAB
CREAT UR-MCNC: 17 MG/DL
MICROALBUMIN UR-MCNC: <5 MG/L
MICROALBUMIN/CREAT UR: NORMAL MG/G{CREAT}

## 2021-09-21 ENCOUNTER — TELEPHONE (OUTPATIENT)
Dept: FAMILY MEDICINE | Facility: CLINIC | Age: 56
End: 2021-09-21

## 2021-09-21 NOTE — TELEPHONE ENCOUNTER
Documented the missing labs on form and faxed to # [phone]. Also, mailed to patient the copy of form.    Stalin Sexton

## 2021-09-21 NOTE — TELEPHONE ENCOUNTER
Forms/Letter Request    Name of form/letter:   Biometric Form for her physical on 9/15/2021    Have you been seen for this request: Yes     Do we have the form/letter: Yes:  Dropped off at  as we mailed her the original    When is form/letter needed by:   10/30/21    How would you like the form/letter returned: Fax  Fax to [phone]  Mail back a copy to patient at [facility]  Patient Notified form requests are processed in 3-5 business days:Yes    Okay to leave a detailed message? Yes Cell number on file:    Telephone Information:   Mobile [phone]

## 2021-09-22 ENCOUNTER — PATIENT OUTREACH (OUTPATIENT)
Dept: GASTROENTEROLOGY | Facility: CLINIC | Age: 56
End: 2021-09-22

## 2021-09-22 ENCOUNTER — TELEPHONE (OUTPATIENT)
Dept: GASTROENTEROLOGY | Facility: CLINIC | Age: 56
End: 2021-09-22

## 2021-10-05 ENCOUNTER — MEDICAL CORRESPONDENCE (OUTPATIENT)
Dept: HEALTH INFORMATION MANAGEMENT | Facility: CLINIC | Age: 56
End: 2021-10-05

## 2021-12-15 ENCOUNTER — PATIENT OUTREACH (OUTPATIENT)
Dept: GASTROENTEROLOGY | Facility: CLINIC | Age: 56
End: 2021-12-15
Payer: COMMERCIAL

## 2021-12-15 ENCOUNTER — MEDICAL CORRESPONDENCE (OUTPATIENT)
Dept: HEALTH INFORMATION MANAGEMENT | Facility: CLINIC | Age: 56
End: 2021-12-15
Payer: COMMERCIAL

## 2021-12-15 NOTE — PROGRESS NOTES
Patient calls to discuss increase in symptoms  Inflectra 10mg/kg every 28 days  States she continues to have canker sores, sarthak from [place]  Patient just had an infusion last week  Will do inflectra level right before the next infusion   Pt has infusion with option so will go to [place] near her home to complete   Delayed my chart message for a reminder for lab

## 2021-12-16 ENCOUNTER — LAB (OUTPATIENT)
Dept: LAB | Facility: CLINIC | Age: 56
End: 2021-12-16
Payer: COMMERCIAL

## 2021-12-16 DIAGNOSIS — K70.31 ALCOHOLIC CIRRHOSIS OF LIVER WITH ASCITES (H): ICD-10-CM

## 2021-12-16 LAB
ALBUMIN SERPL-MCNC: 3.1 G/DL (ref 3.4–5)
ALP SERPL-CCNC: 111 U/L (ref 40–150)
ALT SERPL W P-5'-P-CCNC: 49 U/L (ref 0–50)
ANION GAP SERPL CALCULATED.3IONS-SCNC: 6 MMOL/L (ref 3–14)
AST SERPL W P-5'-P-CCNC: 57 U/L (ref 0–45)
BILIRUB DIRECT SERPL-MCNC: 0.4 MG/DL (ref 0–0.2)
BILIRUB SERPL-MCNC: 1.3 MG/DL (ref 0.2–1.3)
BUN SERPL-MCNC: 14 MG/DL (ref 7–30)
CALCIUM SERPL-MCNC: 9 MG/DL (ref 8.5–10.1)
CHLORIDE BLD-SCNC: 107 MMOL/L (ref 94–109)
CO2 SERPL-SCNC: 26 MMOL/L (ref 20–32)
CREAT SERPL-MCNC: 0.7 MG/DL (ref 0.52–1.04)
ERYTHROCYTE [DISTWIDTH] IN BLOOD BY AUTOMATED COUNT: 13.1 % (ref 10–15)
GFR SERPL CREATININE-BSD FRML MDRD: >90 ML/MIN/1.73M2
GLUCOSE BLD-MCNC: 87 MG/DL (ref 70–99)
HCT VFR BLD AUTO: 41.5 % (ref 35–47)
HGB BLD-MCNC: 14.1 G/DL (ref 11.7–15.7)
INR PPP: 1.14 (ref 0.85–1.15)
MCH RBC QN AUTO: 34.9 PG (ref 26.5–33)
MCHC RBC AUTO-ENTMCNC: 34 G/DL (ref 31.5–36.5)
MCV RBC AUTO: 103 FL (ref 78–100)
PLATELET # BLD AUTO: 98 10E3/UL (ref 150–450)
POTASSIUM BLD-SCNC: 4.3 MMOL/L (ref 3.4–5.3)
PROT SERPL-MCNC: 7.7 G/DL (ref 6.8–8.8)
RBC # BLD AUTO: 4.04 10E6/UL (ref 3.8–5.2)
SODIUM SERPL-SCNC: 139 MMOL/L (ref 133–144)
WBC # BLD AUTO: 3.6 10E3/UL (ref 4–11)

## 2021-12-16 PROCEDURE — 85610 PROTHROMBIN TIME: CPT

## 2021-12-16 PROCEDURE — 85027 COMPLETE CBC AUTOMATED: CPT

## 2021-12-16 PROCEDURE — 36415 COLL VENOUS BLD VENIPUNCTURE: CPT

## 2021-12-16 PROCEDURE — 80053 COMPREHEN METABOLIC PANEL: CPT

## 2021-12-16 PROCEDURE — 82248 BILIRUBIN DIRECT: CPT

## 2021-12-17 ENCOUNTER — VIRTUAL VISIT (OUTPATIENT)
Dept: GASTROENTEROLOGY | Facility: CLINIC | Age: 56
End: 2021-12-17
Attending: INTERNAL MEDICINE
Payer: COMMERCIAL

## 2021-12-17 DIAGNOSIS — K70.31 ALCOHOLIC CIRRHOSIS OF LIVER WITH ASCITES (H): Primary | ICD-10-CM

## 2021-12-17 DIAGNOSIS — K72.90 DECOMPENSATION OF CIRRHOSIS OF LIVER (H): ICD-10-CM

## 2021-12-17 DIAGNOSIS — K74.60 DECOMPENSATION OF CIRRHOSIS OF LIVER (H): ICD-10-CM

## 2021-12-17 PROCEDURE — 99214 OFFICE O/P EST MOD 30 MIN: CPT | Mod: GT | Performed by: INTERNAL MEDICINE

## 2021-12-17 RX ORDER — SPIRONOLACTONE 100 MG/1
100 TABLET, FILM COATED ORAL DAILY
Qty: 90 TABLET | Refills: 3 | Status: SHIPPED | OUTPATIENT
Start: 2021-12-17 | End: 2022-06-01

## 2021-12-17 RX ORDER — FUROSEMIDE 40 MG
40 TABLET ORAL DAILY
Qty: 90 TABLET | Refills: 3 | Status: SHIPPED | OUTPATIENT
Start: 2021-12-17 | End: 2022-06-01

## 2021-12-17 ASSESSMENT — PAIN SCALES - GENERAL: PAINLEVEL: NO PAIN (0)

## 2021-12-17 NOTE — PROGRESS NOTES
A/P  56 Y F with DUNN and alcohol related cirrhosis. MELD 8    Cirrhosis c/b ascites Away from ETOH since August 2019 months, Labs have stabilized to normal liver function    HCC screening Overdue. Ordered.  Cholecystitis Cystic duct stent in place.  Variceal screening  No varices on EGD 2019. Due. Ordered  Thrombocytopenia 2/2 ETOH and cirrhosis. Plt around 100.    Ascites Controlled with diuretics. Discussed this and low Na. Ebau 100 mg and furosemide 40. Renewed.  AUD in sustained remission  Bone health DEXA showed osteopenia. Taking ca and D.DEXA Due 2022  Crohns Sees Dr Schilling.    Discussed cirrhosis and its sequelae.     RTC 1 y      Jeannette Cervantes MD  Hepatology/Liver Transplant  Medical Director, Liver Transplantation  [facility]  Subjective  56 Y F with alcohol related cirrhosis. Diagnosis was made in 2019 when she was having colon evaluations with a diagnosis of perianal Crohn's and her elevated liver tests were addressed. She had elevated liver tests for about 7 years.    Admitted [facility] 4/21/20 for acute on chronic cholecystitis, underwent ERCP with cystic stent, pancreatic stent. AXR 5/20/20 showed cystic duct stent. Doing well since then. Fluid under control. No pain.     Liver biopsy 4/24/19 read by Dr. Ger Montoya  1. Steatohepatitis     a. Moderate steatosis (35%, mixed macro and microvesicular, non-zonal)     b. Mild necroinflammatory activity (grade 1 of 3)     c. Well-developed micronodular cirrhosis (stage 4 of 4)  2. Negative for autoimmune hepatitis or other intercurrent liver disease   3. See comment    A) Needle biopsy of liver provides an adequate sample, 1.8 cm in aggregate length, with approximately nine portal areas present. Bile ducts are intact. Well-developed micronodular cirrhosis is associated with overt steatohepatitis. Prominent Linh's hyaline is present, somewhat suggestive of alcoholic etiology. There is no significant lymphoplasma cellular infiltrate  and no interface hepatitis to support diagnosis of autoimmune hepatitis. Trichrome stain confirms micronodular cirrhosis. An iron stain is negative.    Reviewed with Dr. Cardona.   53-year-old woman presents with abnormal liver tests and cross sectional CT suggestive of cirrhosis. The patient has been diagnosed with portal hypertension and has anal changes clinically suggestive of Crohn's disease. Serum chemistries include AST 99, ALT 65, alkaline phosphatase 151, total bilirubin 2.4, mildly elevated IgG and IgM and elevated F-actin at 28. Antinuclear antibody is negative.     AUD  Last alcohol was August 2019. Has an NA beer on occasion.  Alcohol pattern had been 3-4 times per week, 2-6 beers depending on activities.   No CD treatment, no DWI.    Ascites  Para 10/9/19 3700 ml  This has resolved on kathleen 100 and furosemide 40    Crohns  She was started on infliximab     Risk factors for fatty liver: was obese in the past, typically 200 pounds. No DM. HTN     Lab Test 12/16/21  1038   PROTTOTAL 7.7   ALBUMIN 3.1*   BILITOTAL 1.3   ALKPHOS 111   AST 57*   ALT 49     Lab Test 12/16/21  1038   WBC 3.6*   RBC 4.04   HGB 14.1   HCT 41.5   *   MCH 34.9*   MCHC 34.0   RDW 13.1   PLT 98*       MELD-Na score: 8 at 12/16/2021 10:38 AM  MELD score: 8 at 12/16/2021 10:38 AM  Calculated from:  Serum Creatinine: 0.70 mg/dL (Using min of 1 mg/dL) at 12/16/2021 10:38 AM  Serum Sodium: 139 mmol/L (Using max of 137 mmol/L) at 12/16/2021 10:38 AM  Total Bilirubin: 1.3 mg/dL at 12/16/2021 10:38 AM  INR(ratio): 1.14 at 12/16/2021 10:34 AM  Age: 56 years    HCV neg  HBV neg  JILL neg  Factin 28  AMA neg  Iron panel sat 59, ferritin 349  No HFE testing  Liver biopsy c/w alcoholic liver disease    Past Medical History  Past Medical History:   Diagnosis Date     Alcohol abuse      Alcoholic cirrhosis of liver with ascites      Atypical ductal hyperplasia of breast 9/10/10    ERT not recommended -left - and flat epithelial  atypia-scheduled for breast biopsy 9/17/2010      Bifid uvula      Cholelithiasis      Contact perianal dermatitis and other eczema     recurrent - clobetasol      Crohn disease      Fear of flying      - gets Ativan prn.      Hypertension      IBS (irritable bowel syndrome)      Social History  Social History     Socioeconomic History     Marital status:      Spouse name: Albino     Number of children: 3     Years of education: 14     Highest education level: Not on file   Occupational History     Occupation: [employer]     Comment:    Tobacco Use     Smoking status: Never Smoker     Smokeless tobacco: Never Used   Substance and Sexual Activity     Alcohol use: Not Currently     Drug use: No     Comment: no herbal meds either     Sexual activity: Yes     Partners: Male     Birth control/protection: Post-menopausal     Comment: husb had vasectomy   Other Topics Concern      Service No     Blood Transfusions No     Caffeine Concern No     Comment: rarely drinks caffeine     Occupational Exposure Not Asked     Hobby Hazards Not Asked     Sleep Concern Not Asked     Stress Concern Not Asked     Weight Concern Not Asked     Special Diet Not Asked     Back Care Not Asked     Exercise Yes     Comment: does a lot of walking - 4x/week     Bike Helmet Not Asked     Seat Belt Yes     Comment: always     Self-Exams Yes     Comment: SBE encouraged monthly     Parent/sibling w/ CABG, MI or angioplasty before 65F 55M? Yes   Social History Narrative    calcium - drinks 5-6 large glasses skim milk/day    flex sig/colonoscopy -at age 49    sun precautions - discussed    mammogram - needs every 2 years in her 30's, then yearly from then on    Td booster - 9/99 and 4/27/2010    pneumovax -at age 59    DEXA -when perimenopausal    stool hemoccults - every year after age 40    ASA- start at age 40    mulvitamin - encouraged     Social Determinants of Health     Financial Resource Strain: Low  Risk      Difficulty of Paying Living Expenses: Not very hard   Food Insecurity: No Food Insecurity     Worried About Running Out of Food in the Last Year: Never true     Ran Out of Food in the Last Year: Never true   Transportation Needs: No Transportation Needs     Lack of Transportation (Medical): No     Lack of Transportation (Non-Medical): No   Physical Activity: Not on file   Stress: Not on file   Social Connections: Not on file   Intimate Partner Violence: Not on file   Housing Stability: Not on file   Not currently working since 2019. Got laid off and hasn't returned due to health.     Family History  Family History   Problem Relation Age of Onset     Breast Cancer Mother      Gastrointestinal Disease Mother      Heart Disease Father 57     Heart Disease Paternal Grandfather      Hypertension Maternal Grandmother      Diabetes Paternal Grandmother      Diabetes Maternal Grandfather      Cancer Sister    F  from heart disease    ROS 10 point ROS neg other than the symptoms noted above in the HPI.  Exam  Gen Alert pleasant NAD  Resp No difficulty breathing. No cough  Skin No Jaundice  Eyes No icterus  Neuro HERRERA  MSK no muscle wasting  Psyche Pleasant, appropriate. Well groomed.    Abiola Matute is a 56 year old female who is being evaluated via a billable video visit.    Video-Visit Details  Type of service:  Video Visit  Video Start Time: 104  Video End Time:105  Originating Location (pt. Location):home  Distant Location (provider location):  [facility] [place]      Platform used for Video Visit: [employer] or [employer]

## 2021-12-17 NOTE — LETTER
12/17/2021     RE: Abiola Matute  [address] [address]    Dear Colleague,    Thank you for referring your patient, Abiola Matute, to the [facility] [place]. Please see a copy of my visit note below.    A/P  56 Y F with DUNN and alcohol related cirrhosis. MELD 8    Cirrhosis c/b ascites Away from ETOH since August 2019 months, Labs have stabilized to normal liver function    HCC screening Overdue. Ordered.  Cholecystitis Cystic duct stent in place.  Variceal screening  No varices on EGD 2019. Due. Ordered  Thrombocytopenia 2/2 ETOH and cirrhosis. Plt around 100.    Ascites Controlled with diuretics. Discussed this and low Na. Bath 100 mg and furosemide 40. Renewed.  AUD in sustained remission  Bone health DEXA showed osteopenia. Taking ca and D.DEXA Due 2022  Crohns Sees Dr Schilling.    Discussed cirrhosis and its sequelae.     RTC 1 y      Jeannette Cervantes MD  Hepatology/Liver Transplant  Medical Director, Liver Transplantation  [facility]  Subjective  56 Y F with alcohol related cirrhosis. Diagnosis was made in 2019 when she was having colon evaluations with a diagnosis of perianal Crohn's and her elevated liver tests were addressed. She had elevated liver tests for about 7 years.    Admitted [facility] 4/21/20 for acute on chronic cholecystitis, underwent ERCP with cystic stent, pancreatic stent. AXR 5/20/20 showed cystic duct stent. Doing well since then. Fluid under control. No pain.     Liver biopsy 4/24/19 read by Dr. Ger Montoya  1. Steatohepatitis     a. Moderate steatosis (35%, mixed macro and microvesicular, non-zonal)     b. Mild necroinflammatory activity (grade 1 of 3)     c. Well-developed micronodular cirrhosis (stage 4 of 4)  2. Negative for autoimmune hepatitis or other intercurrent liver disease   3. See comment    A) Needle biopsy of liver provides an adequate sample, 1.8 cm in aggregate length, with approximately nine portal  areas present. Bile ducts are intact. Well-developed micronodular cirrhosis is associated with overt steatohepatitis. Prominent Linh's hyaline is present, somewhat suggestive of alcoholic etiology. There is no significant lymphoplasma cellular infiltrate and no interface hepatitis to support diagnosis of autoimmune hepatitis. Trichrome stain confirms micronodular cirrhosis. An iron stain is negative.    Reviewed with Dr. Cardona.   53-year-old woman presents with abnormal liver tests and cross sectional CT suggestive of cirrhosis. The patient has been diagnosed with portal hypertension and has anal changes clinically suggestive of Crohn's disease. Serum chemistries include AST 99, ALT 65, alkaline phosphatase 151, total bilirubin 2.4, mildly elevated IgG and IgM and elevated F-actin at 28. Antinuclear antibody is negative.     AUD  Last alcohol was August 2019. Has an NA beer on occasion.  Alcohol pattern had been 3-4 times per week, 2-6 beers depending on activities.   No CD treatment, no DWI.    Ascites  Para 10/9/19 3700 ml  This has resolved on kathleen 100 and furosemide 40    Crohns  She was started on infliximab     Risk factors for fatty liver: was obese in the past, typically 200 pounds. No DM. HTN     Lab Test 12/16/21  1038   PROTTOTAL 7.7   ALBUMIN 3.1*   BILITOTAL 1.3   ALKPHOS 111   AST 57*   ALT 49     Lab Test 12/16/21  1038   WBC 3.6*   RBC 4.04   HGB 14.1   HCT 41.5   *   MCH 34.9*   MCHC 34.0   RDW 13.1   PLT 98*       MELD-Na score: 8 at 12/16/2021 10:38 AM  MELD score: 8 at 12/16/2021 10:38 AM  Calculated from:  Serum Creatinine: 0.70 mg/dL (Using min of 1 mg/dL) at 12/16/2021 10:38 AM  Serum Sodium: 139 mmol/L (Using max of 137 mmol/L) at 12/16/2021 10:38 AM  Total Bilirubin: 1.3 mg/dL at 12/16/2021 10:38 AM  INR(ratio): 1.14 at 12/16/2021 10:34 AM  Age: 56 years    HCV neg  HBV neg  JILL neg  Factin 28  AMA neg  Iron panel sat 59, ferritin 349  No HFE testing  Liver biopsy c/w alcoholic  liver disease    Past Medical History  Past Medical History:   Diagnosis Date     Alcohol abuse      Alcoholic cirrhosis of liver with ascites      Atypical ductal hyperplasia of breast 9/10/10    ERT not recommended -left - and flat epithelial atypia-scheduled for breast biopsy 9/17/2010      Bifid uvula      Cholelithiasis      Contact perianal dermatitis and other eczema     recurrent - clobetasol      Crohn disease      Fear of flying      - gets Ativan prn.      Hypertension      IBS (irritable bowel syndrome)      Social History  Social History     Socioeconomic History     Marital status:      Spouse name: Albino     Number of children: 3     Years of education: 14     Highest education level: Not on file   Occupational History     Occupation: [email or web address]     Comment:    Tobacco Use     Smoking status: Never Smoker     Smokeless tobacco: Never Used   Substance and Sexual Activity     Alcohol use: Not Currently     Drug use: No     Comment: no herbal meds either     Sexual activity: Yes     Partners: Male     Birth control/protection: Post-menopausal     Comment: husb had vasectomy   Other Topics Concern      Service No     Blood Transfusions No     Caffeine Concern No     Comment: rarely drinks caffeine     Occupational Exposure Not Asked     Hobby Hazards Not Asked     Sleep Concern Not Asked     Stress Concern Not Asked     Weight Concern Not Asked     Special Diet Not Asked     Back Care Not Asked     Exercise Yes     Comment: does a lot of walking - 4x/week     Bike Helmet Not Asked     Seat Belt Yes     Comment: always     Self-Exams Yes     Comment: SBE encouraged monthly     Parent/sibling w/ CABG, MI or angioplasty before 65F 55M? Yes   Social History Narrative    calcium - drinks 5-6 large glasses skim milk/day    flex sig/colonoscopy -at age 49    sun precautions - discussed    mammogram - needs every 2 years in her 30's, then yearly from then on    Td  booster -  and 2010    pneumovax -at age 59    DEXA -when perimenopausal    stool hemoccults - every year after age 40    ASA- start at age 39    mulvitamin - encouraged     Social Determinants of Health     Financial Resource Strain: Low Risk      Difficulty of Paying Living Expenses: Not very hard   Food Insecurity: No Food Insecurity     Worried About Running Out of Food in the Last Year: Never true     Ran Out of Food in the Last Year: Never true   Transportation Needs: No Transportation Needs     Lack of Transportation (Medical): No     Lack of Transportation (Non-Medical): No   Physical Activity: Not on file   Stress: Not on file   Social Connections: Not on file   Intimate Partner Violence: Not on file   Housing Stability: Not on file   Not currently working since 2019. Got laid off and hasn't returned due to health.     Family History  Family History   Problem Relation Age of Onset     Breast Cancer Mother      Gastrointestinal Disease Mother      Heart Disease Father 57     Heart Disease Paternal Grandfather      Hypertension Maternal Grandmother      Diabetes Paternal Grandmother      Diabetes Maternal Grandfather      Cancer Sister    F  from heart disease    ROS 10 point ROS neg other than the symptoms noted above in the HPI.  Exam  Gen Alert pleasant NAD  Resp No difficulty breathing. No cough  Skin No Jaundice  Eyes No icterus  Neuro HERRERA  MSK no muscle wasting  Psyche Pleasant, appropriate. Well groomed.    Abiola Matute is a 56 year old female who is being evaluated via a billable video visit.    Video-Visit Details  Type of service:  Video Visit  Video Start Time: 1044  Video End Time:1058  Originating Location (pt. Location):home  Distant Location (provider location):  [facility] [place]      Platform used for Video Visit: Govind or Artemio    Again, thank you for allowing me to participate in the care of your patient.      Sincerely,    Jeannette  Diane Cervantes MD

## 2021-12-17 NOTE — PROGRESS NOTES
"Virginia is a 56 year old who is being evaluated via a billable video visit.      How would you like to obtain your AVS? MyChart  If the video visit is dropped, the invitation should be resent by: Text to cell phone: [phone]  Will anyone else be joining your video visit? No  {If patient encounters technical issues they should call [phone] :[id number]}    Video Start Time: {video visit start/end time for provider to select:152948}  Video-Visit Details    Type of service:  Video Visit    Video End Time:{video visit start/end time for provider to select:152948}    Originating Location (pt. Location): {video visit patient location:541233::\"Home\"}    Distant Location (provider location):  [facility]PAT[facility] [place]     Platform used for Video Visit: {Virtual Visit Platforms:218119::\"[employer]\"}    "

## 2021-12-22 ENCOUNTER — IMMUNIZATION (OUTPATIENT)
Dept: NURSING | Facility: CLINIC | Age: 56
End: 2021-12-22
Payer: COMMERCIAL

## 2021-12-22 PROCEDURE — 91300 PR COVID VAC PFIZER DIL RECON 30 MCG/0.3 ML IM: CPT

## 2021-12-22 PROCEDURE — 0004A PR COVID VAC PFIZER DIL RECON 30 MCG/0.3 ML IM: CPT

## 2021-12-29 ENCOUNTER — PATIENT OUTREACH (OUTPATIENT)
Dept: GASTROENTEROLOGY | Facility: CLINIC | Age: 56
End: 2021-12-29
Payer: COMMERCIAL

## 2022-02-07 ENCOUNTER — OFFICE VISIT (OUTPATIENT)
Dept: GASTROENTEROLOGY | Facility: CLINIC | Age: 57
End: 2022-02-07
Payer: COMMERCIAL

## 2022-02-07 ENCOUNTER — LAB (OUTPATIENT)
Dept: LAB | Facility: CLINIC | Age: 57
End: 2022-02-07
Payer: COMMERCIAL

## 2022-02-07 ENCOUNTER — PATIENT OUTREACH (OUTPATIENT)
Dept: GASTROENTEROLOGY | Facility: CLINIC | Age: 57
End: 2022-02-07
Payer: COMMERCIAL

## 2022-02-07 VITALS
WEIGHT: 182.7 LBS | SYSTOLIC BLOOD PRESSURE: 121 MMHG | DIASTOLIC BLOOD PRESSURE: 84 MMHG | BODY MASS INDEX: 29.36 KG/M2 | HEIGHT: 66 IN | HEART RATE: 79 BPM | OXYGEN SATURATION: 100 %

## 2022-02-07 DIAGNOSIS — K50.113 CROHN'S DISEASE OF LARGE INTESTINE WITH FISTULA (H): ICD-10-CM

## 2022-02-07 DIAGNOSIS — K70.31 ALCOHOLIC CIRRHOSIS OF LIVER WITH ASCITES (H): ICD-10-CM

## 2022-02-07 DIAGNOSIS — K50.113 CROHN'S DISEASE OF LARGE INTESTINE WITH FISTULA (H): Primary | ICD-10-CM

## 2022-02-07 LAB
ERYTHROCYTE [DISTWIDTH] IN BLOOD BY AUTOMATED COUNT: 14.6 % (ref 10–15)
HCT VFR BLD AUTO: 43.7 % (ref 35–47)
HGB BLD-MCNC: 15.1 G/DL (ref 11.7–15.7)
INR PPP: 1.18 (ref 0.85–1.15)
MCH RBC QN AUTO: 35.7 PG (ref 26.5–33)
MCHC RBC AUTO-ENTMCNC: 34.6 G/DL (ref 31.5–36.5)
MCV RBC AUTO: 103 FL (ref 78–100)
PLATELET # BLD AUTO: 115 10E3/UL (ref 150–450)
RBC # BLD AUTO: 4.23 10E6/UL (ref 3.8–5.2)
WBC # BLD AUTO: 3.1 10E3/UL (ref 4–11)

## 2022-02-07 PROCEDURE — 36415 COLL VENOUS BLD VENIPUNCTURE: CPT

## 2022-02-07 PROCEDURE — 99215 OFFICE O/P EST HI 40 MIN: CPT | Performed by: INTERNAL MEDICINE

## 2022-02-07 PROCEDURE — 85610 PROTHROMBIN TIME: CPT

## 2022-02-07 PROCEDURE — 85027 COMPLETE CBC AUTOMATED: CPT

## 2022-02-07 PROCEDURE — 80230 DRUG ASSAY INFLIXIMAB: CPT | Mod: 90

## 2022-02-07 PROCEDURE — 80053 COMPREHEN METABOLIC PANEL: CPT

## 2022-02-07 PROCEDURE — 80321 ALCOHOLS BIOMARKERS 1OR 2: CPT | Mod: 90

## 2022-02-07 PROCEDURE — 82248 BILIRUBIN DIRECT: CPT

## 2022-02-07 PROCEDURE — 99000 SPECIMEN HANDLING OFFICE-LAB: CPT

## 2022-02-07 PROCEDURE — 82542 COL CHROMOTOGRAPHY QUAL/QUAN: CPT | Mod: 90

## 2022-02-07 RX ORDER — NALOXONE HYDROCHLORIDE 0.4 MG/ML
0.2 INJECTION, SOLUTION INTRAMUSCULAR; INTRAVENOUS; SUBCUTANEOUS
Status: CANCELLED | OUTPATIENT
Start: 2022-02-07

## 2022-02-07 RX ORDER — ACETAMINOPHEN 325 MG/1
650 TABLET ORAL ONCE
Status: CANCELLED
Start: 2022-02-07 | End: 2022-02-07

## 2022-02-07 RX ORDER — DIPHENHYDRAMINE HCL 25 MG
25 CAPSULE ORAL ONCE
Status: CANCELLED
Start: 2022-02-07 | End: 2022-02-07

## 2022-02-07 RX ORDER — EPINEPHRINE 1 MG/ML
0.3 INJECTION, SOLUTION, CONCENTRATE INTRAVENOUS EVERY 5 MIN PRN
Status: CANCELLED | OUTPATIENT
Start: 2022-02-07

## 2022-02-07 RX ORDER — MEPERIDINE HYDROCHLORIDE 25 MG/ML
25 INJECTION INTRAMUSCULAR; INTRAVENOUS; SUBCUTANEOUS EVERY 30 MIN PRN
Status: CANCELLED | OUTPATIENT
Start: 2022-02-07

## 2022-02-07 RX ORDER — ALBUTEROL SULFATE 0.83 MG/ML
2.5 SOLUTION RESPIRATORY (INHALATION)
Status: CANCELLED | OUTPATIENT
Start: 2022-02-07

## 2022-02-07 RX ORDER — METHYLPREDNISOLONE SODIUM SUCCINATE 125 MG/2ML
125 INJECTION, POWDER, LYOPHILIZED, FOR SOLUTION INTRAMUSCULAR; INTRAVENOUS
Status: CANCELLED
Start: 2022-02-07

## 2022-02-07 RX ORDER — ALBUTEROL SULFATE 90 UG/1
1-2 AEROSOL, METERED RESPIRATORY (INHALATION)
Status: CANCELLED
Start: 2022-02-07

## 2022-02-07 RX ORDER — DIPHENHYDRAMINE HYDROCHLORIDE 50 MG/ML
50 INJECTION INTRAMUSCULAR; INTRAVENOUS
Status: CANCELLED
Start: 2022-02-07

## 2022-02-07 ASSESSMENT — MIFFLIN-ST. JEOR: SCORE: 1430.47

## 2022-02-07 ASSESSMENT — PAIN SCALES - GENERAL: PAINLEVEL: MODERATE PAIN (5)

## 2022-02-07 NOTE — PROGRESS NOTES
Patient calls to request lab order for inlfiximab level   Pt has clinic appt today  Also   Label sst with at least two patient identifiers and date of collection(acceptable identifiers name, ,medical record # , requisition #)  Collect 5-7 ml peripheral blood in SST  Place SST upright;let stand undisturbed minimum 30 minutes)  Centrifuge at FULL SPEED (0533-9116) for minimum 15 minutes. Observe separation of the serum from the clot  Package the labeled SST tube in biohazard bag.  Place with completed requisition and frozen cold pack inside kit.  SHIP IMMEDIATELY OR REFRIGERATE OVERNIGHT  Blood to be sent to [place] [facility]  Works[employer]   form completed and scanned into the record.    MUST GO TO [employer]

## 2022-02-07 NOTE — LETTER
2/7/2022         RE: Abiola Matute  [address] [address]        Dear Colleague,    Thank you for referring your patient, Abiola Matute, to the [facility] [place]. Please see a copy of my visit note below.    IBD CLINIC VISIT    CC/REFERRING MD:  Referred Self  REASON FOR CONSULTATION: Crohn's.     ASSESSMENT/PLAN:  56 year old female with cirrhosis and Crohn's    1.  Crohn's disease:   Current Medications:     - Infliximab 10 mg/kg every 4 weeks 11/2020.  Current disease activity: HBI: 12 - unclear how much symptoms related to inflammation.   Last endoscopic disease activity: 3/1/21: Normal flex sig.     Infliximab history- started May 2019. Dose increase to 10 mg/kg every 8 weeks but had continued active disease, dose increase to 10 mg/kg every 4 weeks 11/2020.    She is an unclear picture at this time of Crohn's activity.  Her symptoms suggest active Crohn's, although previous endoscopic examination has been unremarkable.  She has had minimal luminal Crohn's activity in the past.  Will assess Crohn's activity with MR enterography.  Discussed the potential need for colonoscopy for further investigation of symptoms.    She also has a number of nonluminal symptoms that may or may not be related to Crohn's activity.  In the absence of antibody to infliximab I suspect her skin symptoms may not be responding to infliximab.  Would appreciate dermatology follow-up and management of umbilical drainage and gluteal cleft fissure.  Otherwise her perianal disease is stable with seton placement.  She is due for seton exchange and given concern for rectovaginal fistula we will also coordinate pelvic MRI    --MR enterography to assess for luminal Crohn's disease  --Pelvic MRI to assess for rectovaginal fistula  --Colorectal follow-up after MRI for seton exchange  --Infliximab trough concentration and antibody to assess pharmacokinetics of infliximab in this patient.    2.  Cirrhosis: Stable.   -- Follow-up in the liver clinic as scheduled.     3. Umbilical drainage: Unclear what this represents. Likely some type of dermatologic manifestation of Crohn's. No evidence of a fistula. Potentially complication of aTNF therapy.  -- Dermatology referral.     Cancer Screening:  Colon cancer screening: Does not clearly have colonic disease. Tentative plan for repeat colonoscopy in 2024 or 2025.     Misc:  -- Avoid tobacco use  -- Avoid NSAIDs as there is potentially a 25% chance of causing an IBD flare    Return to clinic in 6 months    Thank you for this consultation.  It was a pleasure to participate in the care of this patient; please contact us with any further questions.  A total of 45 minutes was spent with this patient on the date of the encounter, 2/8/2022, in the following care activities: Chart review, patient care and documentation.      This note was created with voice recognition software, and while reviewed for accuracy, typos may remain.     Farhan Schilling MD MS    [facility]  Inflammatory Bowel Disease Program  Division of Gastroenterology, Hepatology and Nutrition  Pager: [phone]    IBD HISTORY  Age at diagnosis: 53 (4/2019)  Extent of disease: miguel angel-anal, anal  Disease phenotype: Miguel Angel-anal fistula  Miguel Angel-anal disease: Yes  Prior IBD surgeries: No. Seton placements  Prior IBD Medications: None    DRUG MONITORING  TPMT enzyme activity:     6-TGN/6-MMPN levels:    Biologic concentration:  10/2/2019: IFX 0.8, anti-IFX antibodies: 451 (Esoterix)  1/22/2020 infliximab level 2.3, no antibodies (this is an 8-week trough at 10 mg/kg)  3/4/21 IFX 25.1, no antibodies (4 week trough on 10mg/kg)    sIBDQ:   No flowsheet data found.    HPI:   Three main issues are:   1) umbilicus would - intermittent weeping. No stool, but will occasionally drain pus. On Remicade once she was on every 4 weeks, this was not an issue.     2) She has 1 seton in place at this time. It  "is working OK. She will have some drainage from that site. No abscess. Last colorectal note suggested seton will need to be changed yearly.     3) She also notes some drainage from her vagina. Sometimes a stool like material.     4) Tailbone wound: At the top of her gluteal cleft, she has a paper thin, 1/2 to 1 inch \"fissue\". This improves for 2 weeks after her infliximab, but then recurs.     5) Chancre sores - occurs 2 weeks post infusion    In terms of GI symptoms, she will have more stools in the two weeks preceding her infliximab. Can have 3-9 stools a day. 2 that are formed, then the rest are diarrhea.    Right after infusion, she will have about two weeks of three stools (which is her normal).     When she was on Remicade, she notes that she did not have the umbilical weeping, or vaginal drainage. She notes that her tailbone wound and chancre sores were less frequent.     Of note, she thinks that the umbilical issue perhaps started after infliximab.    HBI:  Overall patient well being (prior day): 1 (Slightly below par)  Abdominal pain (prior day): 2 (Moderate)  Number of liquid or soft stools (prior day): 6 (1 point per stool)  Abdominal mass on exam: 0 (None)  Complications (1 point for each):   Arthralgia, Apthous ulcers and Anal fissue    Remission <5  Mild activity 5-7  Moderate activity 8-16  Severe > 16             ---  Here today for follow-up.   No specific questions. Overall, she is feeling OK.   She changed from Remicade to inflectra. Also had some weight gain and so is on a higher dose.     She still has some issues - particularly canker sores, weeping from her umbilicus, and a small fissure/fistala that opens up at her tailbone. She has setons in place with minimal drainage.     This typically occurs one week before infusion and then self resolves a few days after her infusion.     She reports at best 3 stools a day and at worst 8 stools a day. Typically worse when closer to infusion. Over the " course of a month, 80% is spent having good days and 20% is bad. No blood in her stools.     ROS:    Constitutional, HEENT, cardiovascular, pulmonary, GI, , musculoskeletal, neuro, skin, endocrine and psych systems are negative, except as otherwise noted.     PERTINENT PAST MEDICAL HISTORY:  Past Medical History:   Diagnosis Date     Alcohol abuse      Alcoholic cirrhosis of liver with ascites      Atypical ductal hyperplasia of breast 9/10/10    ERT not recommended -left - and flat epithelial atypia-scheduled for breast biopsy 9/17/2010      Bifid uvula      Cholelithiasis      Contact perianal dermatitis and other eczema     recurrent - clobetasol      Crohn disease      Fear of flying      - gets Ativan prn.      Hypertension      IBS (irritable bowel syndrome)        PREVIOUS SURGERIES:  Past Surgical History:   Procedure Laterality Date     BIOPSY ANAL N/A 3/28/2019    anal biopsy and culure placement of seton - Dr Fleming     BIOPSY BREAST Left 09/17/2010    - scheduled with Dr. Varma      BIOPSY LIVER  2019     COLONOSCOPY  2006     COLONOSCOPY N/A 12/23/2014    Procedure: COMBINED COLONOSCOPY, SINGLE OR MULTIPLE BIOPSY/POLYPECTOMY BY BIOPSY;  Surgeon: Diane Fleming MD;  Location:  GI     COLONOSCOPY N/A 12/5/2019    Procedure: COLONOSCOPY, WITH POLYPECTOMY AND BIOPSY;  Surgeon: Farhan Schilling MD;  Location: [facility] GI     ENDOSCOPIC RETROGRADE CHOLANGIOPANCREATOGRAM N/A 4/24/2020    Procedure: ENDOSCOPIC RETROGRADE CHOLANGIOPANCREATOGRAPHY WITH, sledge removal,sphincterotomy, stent in gallbladder and pancreatic duct stent, and balloon dilation;  Surgeon: Guru Isac Kraft MD;  Location: [facility] OR     ESOPHAGOSCOPY, GASTROSCOPY, DUODENOSCOPY (EGD), COMBINED N/A 12/5/2019    Procedure: ESOPHAGOGASTRODUODENOSCOPY (EGD);  Surgeon: Farhan Schilling MD;  Location: [facility] GI     EXAM UNDER ANESTHESIA ANUS N/A 3/28/2019    Procedure: EXAM UNDER ANESTHESIA ANUS;  Surgeon: Diane Fleming  MD Marlene;  Location:  OR     EXAM UNDER ANESTHESIA ANUS N/A 2/26/2021    Procedure: EXAM UNDER ANESTHESIA OF ANUS, SETON PLACEMENT, EXCISION OF SKIN BRIDGE;  Surgeon: Avtar Nam MD;  Location: [facility] OR     HYSTERECTOMY, VAGINAL  2006    with Dr. Licha Zhou - with BSO for fibroids      IR GALLBLADDER DRAIN PLACEMENT  4/22/2020     OPEN REDUCTION INTERNAL FIXATION ANKLE Left at age 27    plates and screws removed at age 36     Pelviscopy with removal of bilateral hydrosalpinges.  04/15/2010     ZZC APPENDECTOMY  at age 14       PREVIOUS ENDOSCOPY:  3/7/19: Flex sig: Colon and rectum appeared normal. 2 large deep ulcers extending from the anal cnaal to left perianal area.     10/23/18: Colonoscopy: Endoscopically normal ileum.  Mild pan colonic congestion with contact friability thought to be related to portal hypertension and thrombocytopenia.  Prior anal verge ulcer is healed    12/23/14: Colonoscopy: Perianal skin tag noted.  Ileum normal, colon normal.    ALLERGIES:     Allergies   Allergen Reactions     Fish Oil      Redness and itching around eye area only - went away when fish oil capsules stopped      Metronidazole      pain/itching     Naphthalenemethylamines      Lamisil = mild urticarial reaction     Ppd [Tuberculin Purified Protein Derivative]      Sulfa Drugs      hives       PERTINENT MEDICATIONS:    Current Outpatient Medications:      acetaminophen (TYLENOL) 325 MG tablet, Take 1-2 tablets (325-650 mg) by mouth every 6 hours as needed for mild pain Take either 325mg every 6 hours or 650mg every 8 hours for pain. Do not exceed 2000mg in 24 hours., Disp: 100 tablet, Rfl: 0     furosemide (LASIX) 40 MG tablet, Take 1 tablet (40 mg) by mouth daily, Disp: 90 tablet, Rfl: 3     inFLIXimab (REMICADE) 100 MG injection, Inject into the vein once for 1 dose, Disp:  , Rfl:      Milk Thistle-Dand-Fennel-Licor (MILK THISTLE XTRA) CAPS capsule, Take 1 capsule by mouth daily, Disp: , Rfl:      Multiple  Vitamins-Minerals (MULTIVITAMIN & MINERAL PO), Take  by mouth daily., Disp: , Rfl:      spironolactone (ALDACTONE) 100 MG tablet, Take 1 tablet (100 mg) by mouth daily, Disp: 90 tablet, Rfl: 3    SOCIAL HISTORY:  Social History     Socioeconomic History     Marital status:      Spouse name: Albino     Number of children: 3     Years of education: 14     Highest education level: Not on file   Occupational History     Occupation: [employer]     Comment:    Tobacco Use     Smoking status: Never Smoker     Smokeless tobacco: Never Used   Substance and Sexual Activity     Alcohol use: Not Currently     Drug use: No     Comment: no herbal meds either     Sexual activity: Yes     Partners: Male     Birth control/protection: Post-menopausal     Comment: husb had vasectomy   Other Topics Concern      Service No     Blood Transfusions No     Caffeine Concern No     Comment: rarely drinks caffeine     Occupational Exposure Not Asked     Hobby Hazards Not Asked     Sleep Concern Not Asked     Stress Concern Not Asked     Weight Concern Not Asked     Special Diet Not Asked     Back Care Not Asked     Exercise Yes     Comment: does a lot of walking - 4x/week     Bike Helmet Not Asked     Seat Belt Yes     Comment: always     Self-Exams Yes     Comment: SBE encouraged monthly     Parent/sibling w/ CABG, MI or angioplasty before 65F 55M? Yes   Social History Narrative    calcium - drinks 5-6 large glasses skim milk/day    flex sig/colonoscopy -at age 49    sun precautions - discussed    mammogram - needs every 2 years in her 30's, then yearly from then on    Td booster - 9/99 and 4/27/2010    pneumovax -at age 59    DEXA -when perimenopausal    stool hemoccults - every year after age 40    ASA- start at age 40    mulvitamin - encouraged     Social Determinants of Health     Financial Resource Strain: Low Risk      Difficulty of Paying Living Expenses: Not very hard   Food Insecurity: No  "Food Insecurity     Worried About Running Out of Food in the Last Year: Never true     Ran Out of Food in the Last Year: Never true   Transportation Needs: No Transportation Needs     Lack of Transportation (Medical): No     Lack of Transportation (Non-Medical): No   Physical Activity: Not on file   Stress: Not on file   Social Connections: Not on file   Intimate Partner Violence: Not on file   Housing Stability: Not on file       FAMILY HISTORY:  Family History   Problem Relation Age of Onset     Breast Cancer Mother      Gastrointestinal Disease Mother      Heart Disease Father 57     Heart Disease Paternal Grandfather      Hypertension Maternal Grandmother      Diabetes Paternal Grandmother      Diabetes Maternal Grandfather      Cancer Sister        Past/family/social history reviewed and no changes    PHYSICAL EXAMINATION:  Constitutional: aaox3, cooperative, pleasant, not dyspneic/diaphoretic, no acute distress   /84 (BP Location: Left arm, Patient Position: Sitting, Cuff Size: Adult Large)   Pulse 79   Ht 1.676 m (5' 6\")   Wt 82.9 kg (182 lb 11.2 oz)   LMP 05/31/2006   SpO2 100%   BMI 29.49 kg/m    Wt:   Wt Readings from Last 2 Encounters:   02/07/22 82.9 kg (182 lb 11.2 oz)   09/15/21 81.6 kg (180 lb)      PHYSICAL EXAMINATION:  Constitutional: aaox3, cooperative, pleasant, not dyspneic/diaphoretic, no acute distress  Vitals reviewed: /84 (BP Location: Left arm, Patient Position: Sitting, Cuff Size: Adult Large)   Pulse 79   Ht 1.676 m (5' 6\")   Wt 82.9 kg (182 lb 11.2 oz)   LMP 05/31/2006   SpO2 100%   BMI 29.49 kg/m    Wt:   Wt Readings from Last 2 Encounters:   02/07/22 82.9 kg (182 lb 11.2 oz)   09/15/21 81.6 kg (180 lb)      Eyes: Sclera anicteric/injected  Ears/nose/mouth/throat: Normal oropharynx without ulcers or exudate, mucus membranes moist, hearing intact  Neck: supple, thyroid normal size  CV: No edema  Respiratory: Unlabored breathing  Lymph: No axillary, submandibular, " supraclavicular or inguinal lymphadenopathy  Abd: Nondistended, +bs, no hepatosplenomegaly, nontender, no peritoneal signs  Skin: warm, perfused, no jaundice  Psych: Normal affect  MSK: Normal gait        Again, thank you for allowing me to participate in the care of your patient.        Sincerely,        Farhan Schilling MD

## 2022-02-07 NOTE — NURSING NOTE
"Chief Complaint   Patient presents with     Follow Up       Vitals:    02/07/22 1535   BP: 121/84   BP Location: Left arm   Patient Position: Sitting   Cuff Size: Adult Large   Pulse: 79   SpO2: 100%   Weight: 82.9 kg (182 lb 11.2 oz)   Height: 1.676 m (5' 6\")       Body mass index is 29.49 kg/m .      Lou Cruz MA    "

## 2022-02-07 NOTE — PROGRESS NOTES
IBD CLINIC VISIT    CC/REFERRING MD:  Referred Self  REASON FOR CONSULTATION: Crohn's.     ASSESSMENT/PLAN:  56 year old female with cirrhosis and Crohn's    1.  Crohn's disease:   Current Medications:     - Infliximab 10 mg/kg every 4 weeks 11/2020.  Current disease activity: HBI: 12 - unclear how much symptoms related to inflammation.   Last endoscopic disease activity: 3/1/21: Normal flex sig.     Infliximab history- started May 2019. Dose increase to 10 mg/kg every 8 weeks but had continued active disease, dose increase to 10 mg/kg every 4 weeks 11/2020.    She is an unclear picture at this time of Crohn's activity.  Her symptoms suggest active Crohn's, although previous endoscopic examination has been unremarkable.  She has had minimal luminal Crohn's activity in the past.  Will assess Crohn's activity with MR enterography.  Discussed the potential need for colonoscopy for further investigation of symptoms.    She also has a number of nonluminal symptoms that may or may not be related to Crohn's activity.  In the absence of antibody to infliximab I suspect her skin symptoms may not be responding to infliximab.  Would appreciate dermatology follow-up and management of umbilical drainage and gluteal cleft fissure.  Otherwise her perianal disease is stable with seton placement.  She is due for seton exchange and given concern for rectovaginal fistula we will also coordinate pelvic MRI    --MR enterography to assess for luminal Crohn's disease  --Pelvic MRI to assess for rectovaginal fistula  --Colorectal follow-up after MRI for seton exchange  --Infliximab trough concentration and antibody to assess pharmacokinetics of infliximab in this patient.    2. Cirrhosis: Stable.   -- Follow-up in the liver clinic as scheduled.     3. Umbilical drainage: Unclear what this represents. Likely some type of dermatologic manifestation of Crohn's. No evidence of a fistula. Potentially complication of aTNF therapy.  --  Dermatology referral.     Cancer Screening:  Colon cancer screening: Does not clearly have colonic disease. Tentative plan for repeat colonoscopy in 2024 or 2025.     Misc:  -- Avoid tobacco use  -- Avoid NSAIDs as there is potentially a 25% chance of causing an IBD flare    Return to clinic in 6 months    Thank you for this consultation.  It was a pleasure to participate in the care of this patient; please contact us with any further questions.  A total of 45 minutes was spent with this patient on the date of the encounter, 2/7/2022, in the following care activities: Chart review, patient care and documentation.      This note was created with voice recognition software, and while reviewed for accuracy, typos may remain.     Farhan Schilling MD MS    [facility]  Inflammatory Bowel Disease Program  Division of Gastroenterology, Hepatology and Nutrition  Pager: [phone]    IBD HISTORY  Age at diagnosis: 53 (4/2019)  Extent of disease: miguel angel-anal, anal  Disease phenotype: Miguel Angel-anal fistula  Miguel Angel-anal disease: Yes  Prior IBD surgeries: No. Seton placements  Prior IBD Medications: None    DRUG MONITORING  TPMT enzyme activity:     6-TGN/6-MMPN levels:    Biologic concentration:  10/2/2019: IFX 0.8, anti-IFX antibodies: 451 (Esoterix)  1/22/2020 infliximab level 2.3, no antibodies (this is an 8-week trough at 10 mg/kg)  3/4/21 IFX 25.1, no antibodies (4 week trough on 10mg/kg)    sIBDQ:   No flowsheet data found.    HPI:   Three main issues are:   1) umbilicus would - intermittent weeping. No stool, but will occasionally drain pus. On Remicade once she was on every 4 weeks, this was not an issue.     2) She has 1 seton in place at this time. It is working OK. She will have some drainage from that site. No abscess. Last colorectal note suggested seton will need to be changed yearly.     3) She also notes some drainage from her vagina. Sometimes a stool like material.     4) Tailbone wound: At the  "top of her gluteal cleft, she has a paper thin, 1/2 to 1 inch \"fissue\". This improves for 2 weeks after her infliximab, but then recurs.     5) Chancre sores - occurs 2 weeks post infusion    In terms of GI symptoms, she will have more stools in the two weeks preceding her infliximab. Can have 3-9 stools a day. 2 that are formed, then the rest are diarrhea.    Right after infusion, she will have about two weeks of three stools (which is her normal).     When she was on Remicade, she notes that she did not have the umbilical weeping, or vaginal drainage. She notes that her tailbone wound and chancre sores were less frequent.     Of note, she thinks that the umbilical issue perhaps started after infliximab.    HBI:  Overall patient well being (prior day): 1 (Slightly below par)  Abdominal pain (prior day): 2 (Moderate)  Number of liquid or soft stools (prior day): 6 (1 point per stool)  Abdominal mass on exam: 0 (None)  Complications (1 point for each):   Arthralgia, Apthous ulcers and Anal fissue    Remission <5  Mild activity 5-7  Moderate activity 8-16  Severe > 16             ---  Here today for follow-up.   No specific questions. Overall, she is feeling OK.   She changed from Remicade to inflectra. Also had some weight gain and so is on a higher dose.     She still has some issues - particularly canker sores, weeping from her umbilicus, and a small fissure/fistala that opens up at her tailbone. She has setons in place with minimal drainage.     This typically occurs one week before infusion and then self resolves a few days after her infusion.     She reports at best 3 stools a day and at worst 8 stools a day. Typically worse when closer to infusion. Over the course of a month, 80% is spent having good days and 20% is bad. No blood in her stools.     ROS:    Constitutional, HEENT, cardiovascular, pulmonary, GI, , musculoskeletal, neuro, skin, endocrine and psych systems are negative, except as otherwise noted. "     PERTINENT PAST MEDICAL HISTORY:  Past Medical History:   Diagnosis Date     Alcohol abuse      Alcoholic cirrhosis of liver with ascites      Atypical ductal hyperplasia of breast 9/10/10    ERT not recommended -left - and flat epithelial atypia-scheduled for breast biopsy 9/17/2010      Bifid uvula      Cholelithiasis      Contact perianal dermatitis and other eczema     recurrent - clobetasol      Crohn disease      Fear of flying      - gets Ativan prn.      Hypertension      IBS (irritable bowel syndrome)        PREVIOUS SURGERIES:  Past Surgical History:   Procedure Laterality Date     BIOPSY ANAL N/A 3/28/2019    anal biopsy and culure placement of seton - Dr Fleming     BIOPSY BREAST Left 09/17/2010    - scheduled with Dr. Varma      BIOPSY LIVER  2019     COLONOSCOPY  2006     COLONOSCOPY N/A 12/23/2014    Procedure: COMBINED COLONOSCOPY, SINGLE OR MULTIPLE BIOPSY/POLYPECTOMY BY BIOPSY;  Surgeon: Diane Fleming MD;  Location:  GI     COLONOSCOPY N/A 12/5/2019    Procedure: COLONOSCOPY, WITH POLYPECTOMY AND BIOPSY;  Surgeon: Farhan Schilling MD;  Location:  GI     ENDOSCOPIC RETROGRADE CHOLANGIOPANCREATOGRAM N/A 4/24/2020    Procedure: ENDOSCOPIC RETROGRADE CHOLANGIOPANCREATOGRAPHY WITH, sledge removal,sphincterotomy, stent in gallbladder and pancreatic duct stent, and balloon dilation;  Surgeon: Guru Isac Kraft MD;  Location:  OR     ESOPHAGOSCOPY, GASTROSCOPY, DUODENOSCOPY (EGD), COMBINED N/A 12/5/2019    Procedure: ESOPHAGOGASTRODUODENOSCOPY (EGD);  Surgeon: Farhan Schilling MD;  Location:  GI     EXAM UNDER ANESTHESIA ANUS N/A 3/28/2019    Procedure: EXAM UNDER ANESTHESIA ANUS;  Surgeon: Diane Fleming MD;  Location:  OR     EXAM UNDER ANESTHESIA ANUS N/A 2/26/2021    Procedure: EXAM UNDER ANESTHESIA OF ANUS, SETON PLACEMENT, EXCISION OF SKIN BRIDGE;  Surgeon: Avtar Nam MD;  Location: [facility] OR     HYSTERECTOMY, VAGINAL  2006    with   Licha Abelardo - with BSO for fibroids      IR GALLBLADDER DRAIN PLACEMENT  4/22/2020     OPEN REDUCTION INTERNAL FIXATION ANKLE Left at age 27    plates and screws removed at age 36     Pelviscopy with removal of bilateral hydrosalpinges.  04/15/2010     ZZC APPENDECTOMY  at age 14       PREVIOUS ENDOSCOPY:  3/7/19: Flex sig: Colon and rectum appeared normal. 2 large deep ulcers extending from the anal cnaal to left perianal area.     10/23/18: Colonoscopy: Endoscopically normal ileum.  Mild pan colonic congestion with contact friability thought to be related to portal hypertension and thrombocytopenia.  Prior anal verge ulcer is healed    12/23/14: Colonoscopy: Perianal skin tag noted.  Ileum normal, colon normal.    ALLERGIES:     Allergies   Allergen Reactions     Fish Oil      Redness and itching around eye area only - went away when fish oil capsules stopped      Metronidazole      pain/itching     Naphthalenemethylamines      Lamisil = mild urticarial reaction     Ppd [Tuberculin Purified Protein Derivative]      Sulfa Drugs      hives       PERTINENT MEDICATIONS:    Current Outpatient Medications:      acetaminophen (TYLENOL) 325 MG tablet, Take 1-2 tablets (325-650 mg) by mouth every 6 hours as needed for mild pain Take either 325mg every 6 hours or 650mg every 8 hours for pain. Do not exceed 2000mg in 24 hours., Disp: 100 tablet, Rfl: 0     furosemide (LASIX) 40 MG tablet, Take 1 tablet (40 mg) by mouth daily, Disp: 90 tablet, Rfl: 3     inFLIXimab (REMICADE) 100 MG injection, Inject into the vein once for 1 dose, Disp:  , Rfl:      Milk Thistle-Dand-Fennel-Licor (MILK THISTLE XTRA) CAPS capsule, Take 1 capsule by mouth daily, Disp: , Rfl:      Multiple Vitamins-Minerals (MULTIVITAMIN & MINERAL PO), Take  by mouth daily., Disp: , Rfl:      spironolactone (ALDACTONE) 100 MG tablet, Take 1 tablet (100 mg) by mouth daily, Disp: 90 tablet, Rfl: 3    SOCIAL HISTORY:  Social History     Socioeconomic History      Marital status:      Spouse name: Albino     Number of children: 3     Years of education: 14     Highest education level: Not on file   Occupational History     Occupation: [employer]     Comment:    Tobacco Use     Smoking status: Never Smoker     Smokeless tobacco: Never Used   Substance and Sexual Activity     Alcohol use: Not Currently     Drug use: No     Comment: no herbal meds either     Sexual activity: Yes     Partners: Male     Birth control/protection: Post-menopausal     Comment: husb had vasectomy   Other Topics Concern      Service No     Blood Transfusions No     Caffeine Concern No     Comment: rarely drinks caffeine     Occupational Exposure Not Asked     Hobby Hazards Not Asked     Sleep Concern Not Asked     Stress Concern Not Asked     Weight Concern Not Asked     Special Diet Not Asked     Back Care Not Asked     Exercise Yes     Comment: does a lot of walking - 4x/week     Bike Helmet Not Asked     Seat Belt Yes     Comment: always     Self-Exams Yes     Comment: SBE encouraged monthly     Parent/sibling w/ CABG, MI or angioplasty before 65F 55M? Yes   Social History Narrative    calcium - drinks 5-6 large glasses skim milk/day    flex sig/colonoscopy -at age 49    sun precautions - discussed    mammogram - needs every 2 years in her 30's, then yearly from then on    Td booster - 9/99 and 4/27/2010    pneumovax -at age 59    DEXA -when perimenopausal    stool hemoccults - every year after age 40    ASA- start at age 40    mulvitamin - encouraged     Social Determinants of Health     Financial Resource Strain: Low Risk      Difficulty of Paying Living Expenses: Not very hard   Food Insecurity: No Food Insecurity     Worried About Running Out of Food in the Last Year: Never true     Ran Out of Food in the Last Year: Never true   Transportation Needs: No Transportation Needs     Lack of Transportation (Medical): No     Lack of Transportation  "(Non-Medical): No   Physical Activity: Not on file   Stress: Not on file   Social Connections: Not on file   Intimate Partner Violence: Not on file   Housing Stability: Not on file       FAMILY HISTORY:  Family History   Problem Relation Age of Onset     Breast Cancer Mother      Gastrointestinal Disease Mother      Heart Disease Father 57     Heart Disease Paternal Grandfather      Hypertension Maternal Grandmother      Diabetes Paternal Grandmother      Diabetes Maternal Grandfather      Cancer Sister        Past/family/social history reviewed and no changes    PHYSICAL EXAMINATION:  Constitutional: aaox3, cooperative, pleasant, not dyspneic/diaphoretic, no acute distress   /84 (BP Location: Left arm, Patient Position: Sitting, Cuff Size: Adult Large)   Pulse 79   Ht 1.676 m (5' 6\")   Wt 82.9 kg (182 lb 11.2 oz)   LMP 05/31/2006   SpO2 100%   BMI 29.49 kg/m    Wt:   Wt Readings from Last 2 Encounters:   02/07/22 82.9 kg (182 lb 11.2 oz)   09/15/21 81.6 kg (180 lb)      PHYSICAL EXAMINATION:  Constitutional: aaox3, cooperative, pleasant, not dyspneic/diaphoretic, no acute distress  Vitals reviewed: /84 (BP Location: Left arm, Patient Position: Sitting, Cuff Size: Adult Large)   Pulse 79   Ht 1.676 m (5' 6\")   Wt 82.9 kg (182 lb 11.2 oz)   LMP 05/31/2006   SpO2 100%   BMI 29.49 kg/m    Wt:   Wt Readings from Last 2 Encounters:   02/07/22 82.9 kg (182 lb 11.2 oz)   09/15/21 81.6 kg (180 lb)      Eyes: Sclera anicteric/injected  Ears/nose/mouth/throat: Normal oropharynx without ulcers or exudate, mucus membranes moist, hearing intact  Neck: supple, thyroid normal size  CV: No edema  Respiratory: Unlabored breathing  Lymph: No axillary, submandibular, supraclavicular or inguinal lymphadenopathy  Abd: Nondistended, +bs, no hepatosplenomegaly, nontender, no peritoneal signs  Skin: warm, perfused, no jaundice  Psych: Normal affect  MSK: Normal gait    "

## 2022-02-07 NOTE — PATIENT INSTRUCTIONS
It was a pleasure taking care of you today. I've included a brief summary of our discussion and care plan from today's visit below.  Please review this information with your primary care provider.  _______________________________________________________________________    My recommendations are summarized as follows:    -- Dermatology referral: bellybutton drainage    -- MRI pelvis for fistula disease - then see colorectal surgery team again.     -- MRI of small bowel and colon (MR enterography) to look for active Crohn's disease    -- Infliximab level is pending, once that is back, we will discuss options of: adding in a 2nd drug (e.g., azathioprine) or changing back to Remicade (if insurance will allow), or changing to a new biologic all together.     Return to GI Clinic in 4 months to review your progress.     If you need any follow-up appointments, please use the following phone numbers below.    To schedule or reschedule a follow-up GI appointment, call [phone] option 1    To schedule your endoscopy procedure, call [phone] option 2    To schedule imaging, please call [phone]     To schedule your lab appointment at [facility] 1st floor lab call [phone]. Call your [place] lab directly if it is not [facility]. If you use a non-[place] lab, please let Diane know where to fax your lab order at [phone].      _______________________________________________________________________    Please be in touch if there are any further questions that arise following today's visit.  There are multiple ways to contact your gastroenterology care team.      During business hours, you may reach your gastroenterology RN Care Coordinator. For IBD related questions, please call Diane at [phone].     You can always send a secure message through [employer]. [employer] messages are answered by your nurse or doctor typically within 24 hours. Please allow extra time on weekends and holidays.      What is [employer]?  [employer] is a secure way for you to access all of your healthcare records from the [facility].  It is a web based computer program, so you can sign on to it from any location.  It also allows you to send secure messages to your care team.  I recommend signing up for [employer] access if you have not already done so and are comfortable with using a computer.     For urgent/emergent questions after business hours, you may reach the on-call GI Fellow by contacting the [facility]  at [phone].     How will I get the results of any tests ordered?    You will receive all of your results.  If you have signed up for "[employer]hart, any tests ordered at your visit will be available to you after your physician reviews them.  Typically this takes 1-2 weeks.  If there are urgent results that require a change in your care plan, your physician or nurse will call you to discuss the next steps.      Thank you for choosing [facility]!       Sincerely,    Farhan Schilling MD  [facility]  Division of Gastroenterology

## 2022-02-08 LAB
ALBUMIN SERPL-MCNC: 3.5 G/DL (ref 3.4–5)
ALP SERPL-CCNC: 113 U/L (ref 40–150)
ALT SERPL W P-5'-P-CCNC: 71 U/L (ref 0–50)
ANION GAP SERPL CALCULATED.3IONS-SCNC: 6 MMOL/L (ref 3–14)
AST SERPL W P-5'-P-CCNC: 83 U/L (ref 0–45)
BILIRUB DIRECT SERPL-MCNC: 0.6 MG/DL (ref 0–0.2)
BILIRUB SERPL-MCNC: 1.7 MG/DL (ref 0.2–1.3)
BUN SERPL-MCNC: 14 MG/DL (ref 7–30)
CALCIUM SERPL-MCNC: 8.8 MG/DL (ref 8.5–10.1)
CHLORIDE BLD-SCNC: 104 MMOL/L (ref 94–109)
CO2 SERPL-SCNC: 24 MMOL/L (ref 20–32)
CREAT SERPL-MCNC: 0.7 MG/DL (ref 0.52–1.04)
GFR SERPL CREATININE-BSD FRML MDRD: >90 ML/MIN/1.73M2
GLUCOSE BLD-MCNC: 95 MG/DL (ref 70–99)
POTASSIUM BLD-SCNC: 3.8 MMOL/L (ref 3.4–5.3)
PROT SERPL-MCNC: 8.3 G/DL (ref 6.8–8.8)
SODIUM SERPL-SCNC: 134 MMOL/L (ref 133–144)

## 2022-02-09 LAB — PETH BLD-MCNC: 215 NG/ML

## 2022-02-10 NOTE — PHARMACY-ADMISSION MEDICATION HISTORY
Admission medication history interview status for this patient is complete. See [facility] admission navigator for allergy information, prior to admission medications and immunization status.     Medication history interview source(s):Patient  Medication history resources (including written lists, pill bottles, clinic record):epic list, fill history  Primary pharmacy:[facility] [place]    Changes made to [facility] medication list:  Added: none  Deleted: none  Changed: none    Actions taken by pharmacist (provider contacted, etc):None     Additional medication history information:None    Medication reconciliation/reorder completed by provider prior to medication history? Yes      Prior to Admission medications    Medication Sig Last Dose Taking? Auth Provider   furosemide (LASIX) 40 MG tablet Take 1 tablet (40 mg) by mouth daily 4/20/2020 at Unknown time Yes Jeannette Cervantes MD   Milk Thistle-Dand-Fennel-Licor (MILK THISTLE XTRA) CAPS capsule Take 1 capsule by mouth daily 4/20/2020 at Unknown time Yes Unknown, Entered By History   Multiple Vitamins-Minerals (MULTIVITAMIN & MINERAL PO) Take  by mouth daily. 4/20/2020 at Unknown time Yes Reported, Patient   spironolactone (ALDACTONE) 100 MG tablet Take 1 tablet (100 mg) by mouth daily 4/20/2020 at Unknown time Yes Jeannette Cervantes MD   ursodiol (ACTIGALL) 250 MG tablet Take 1.5 tablets (375 mg) by mouth 2 times daily 4/19/2020 at Unknown time Yes Farhan Schilling MD       
No

## 2022-02-14 LAB — INFLIXIMAB SERPL-MCNC: 29.4 UG/ML

## 2022-03-29 ENCOUNTER — DOCUMENTATION ONLY (OUTPATIENT)
Dept: GASTROENTEROLOGY | Facility: CLINIC | Age: 57
End: 2022-03-29

## 2022-03-29 NOTE — PROGRESS NOTES
Received fax from [facility] they need most recent office note to obtain prior authorization for ordered services. Fax to [phone].    Office note from 2-7-22 faxed back to [facility] at [phone].

## 2022-03-31 ENCOUNTER — MEDICAL CORRESPONDENCE (OUTPATIENT)
Dept: HEALTH INFORMATION MANAGEMENT | Facility: CLINIC | Age: 57
End: 2022-03-31

## 2022-05-04 ENCOUNTER — DOCUMENTATION ONLY (OUTPATIENT)
Dept: GASTROENTEROLOGY | Facility: CLINIC | Age: 57
End: 2022-05-04

## 2022-05-04 NOTE — PROGRESS NOTES
Received fax from [facility]. Prescriber orders. Service covered period 5/25/2022 to 5/25/2023.    Printed and placed in provider folder.

## 2022-05-10 ENCOUNTER — MEDICAL CORRESPONDENCE (OUTPATIENT)
Dept: HEALTH INFORMATION MANAGEMENT | Facility: CLINIC | Age: 57
End: 2022-05-10

## 2022-05-11 NOTE — PROGRESS NOTES
Order signed and faxed back to [facility] at [phone]. Copy scanned into patient chart.

## 2022-05-12 ENCOUNTER — TELEPHONE (OUTPATIENT)
Dept: GASTROENTEROLOGY | Facility: CLINIC | Age: 57
End: 2022-05-12

## 2022-05-12 NOTE — TELEPHONE ENCOUNTER
[facility] Call Center    Phone Message    May a detailed message be left on voicemail: yes     Reason for Call: Medication Refill Request    Has the patient contacted the pharmacy for the refill? Yes   Name of medication being requested: inFLIXimab (REMICADE) 100 MG injection  Provider who prescribed the medication:  Dr. Farhan Schilling   Pharmacy: [facility]  Date medication is needed: ASAP     Please re-fax order to [phone]    Action Taken: Message routed to:  [facility] gastro    Travel Screening: Not Applicable

## 2022-05-13 NOTE — TELEPHONE ENCOUNTER
Refill Remicade  Last Office Visit : 2-7-2022 with Dr. Schilling  Future Office visit:  6-27-22 with Dr. Schilling    Labs: Had level completed 2-7-22      Needs per LOV:  -Dermatology referral: bellybutton drainage- nothing scheduled  -MRI pelvis for fistula disease, then see colorectal surgery team again- nothing scheduled nor done   -MRI of small bowel and colon - nothing scheduled nor done.    Please review and refill if appropriate.      Thank you,   Zayra KERNS  [phone]

## 2022-05-18 ENCOUNTER — TELEPHONE (OUTPATIENT)
Dept: GASTROENTEROLOGY | Facility: CLINIC | Age: 57
End: 2022-05-18

## 2022-05-18 NOTE — TELEPHONE ENCOUNTER
[facility] Call Center    Phone Message    May a detailed message be left on voicemail: yes     Reason for Call: Medication Refill Request    Has the patient contacted the pharmacy for the refill? Yes   Name of medication being requested: inFLIXimab (REMICADE) 100 MG injection  Provider who prescribed the medication:  Dr. Farhan Schilling   Pharmacy: [facility]  Date medication is needed: ASAP     [phone]    Action Taken: Message routed to:  [facility] gastro    Travel Screening: Not Applicable

## 2022-05-24 ENCOUNTER — MEDICAL CORRESPONDENCE (OUTPATIENT)
Dept: HEALTH INFORMATION MANAGEMENT | Facility: CLINIC | Age: 57
End: 2022-05-24

## 2022-05-25 ENCOUNTER — TELEPHONE (OUTPATIENT)
Dept: GASTROENTEROLOGY | Facility: CLINIC | Age: 57
End: 2022-05-25

## 2022-05-25 NOTE — TELEPHONE ENCOUNTER
Correct form signed with inflectra and faxed back to Mal[facility] at [phone]. Copy scanned into chart. Chart correction sent to have the one scanned with wrong medication deleted.

## 2022-05-25 NOTE — TELEPHONE ENCOUNTER
----- Message from Zayra Jhonson LPN sent at 5/19/2022 12:37 PM CDT -----  Regarding: FW: can you refax form  Got a fax back from Porfirio. She needs a new one signed as she stated she forgot to change the drug on that was signed and sent back was for remicade and not inflectra. I put in folder to be signed.    Zayra Johnson LPN    ----- Message -----  From: Zayra Johnson LPN  Sent: 5/19/2022  10:41 AM CDT  To: Diane Mckay RN  Subject: RE: can you refax form                           [facility]ed [facility] attn: porfirio Johnson LPN    ----- Message -----  From: Diane Mckay RN  Sent: 5/19/2022   9:59 AM CDT  To: Zayra Johnson LPN, Lorin Núñez CMA  Subject: RE: can you refax form                           Yes I faxed- the form  when I was in clinic on Tuesday and scanned into chart '  Message late yesterday did not receive   Can you fax again and yesenia attention Porfirio  ----- Message -----  From: Zayra Johnson LPN  Sent: 5/18/2022  10:11 AM CDT  To: Diane Mckay RN, Lorin Núñez CMA  Subject: RE: can you refax form                           Refax the [facility] orders?    Zayra Johnson LPN    ----- Message -----  From: Diane Mckay RN  Sent: 5/18/2022   9:15 AM CDT  To: Zayra Johnson LPN, Lorin Núñez CMA  Subject: can you refax form                               I faxed yesterday afternoon and scanned into chart   I see in the media tab   Can you refax and attention Yolande   Please and thanks

## 2022-05-31 DIAGNOSIS — K74.60 DECOMPENSATION OF CIRRHOSIS OF LIVER (H): ICD-10-CM

## 2022-05-31 DIAGNOSIS — K72.90 DECOMPENSATION OF CIRRHOSIS OF LIVER (H): ICD-10-CM

## 2022-06-01 RX ORDER — SPIRONOLACTONE 100 MG/1
TABLET, FILM COATED ORAL
Qty: 90 TABLET | Refills: 3 | Status: SHIPPED | OUTPATIENT
Start: 2022-06-01 | End: 2023-05-05

## 2022-06-01 RX ORDER — FUROSEMIDE 40 MG
TABLET ORAL
Qty: 90 TABLET | Refills: 3 | Status: SHIPPED | OUTPATIENT
Start: 2022-06-01 | End: 2023-05-05

## 2022-06-10 ENCOUNTER — LAB (OUTPATIENT)
Dept: LAB | Facility: CLINIC | Age: 57
End: 2022-06-10
Payer: COMMERCIAL

## 2022-06-10 DIAGNOSIS — K50.113 CROHN'S DISEASE OF LARGE INTESTINE WITH FISTULA (H): ICD-10-CM

## 2022-06-10 PROCEDURE — 86481 TB AG RESPONSE T-CELL SUSP: CPT

## 2022-06-10 PROCEDURE — 36415 COLL VENOUS BLD VENIPUNCTURE: CPT

## 2022-06-13 ENCOUNTER — OFFICE VISIT (OUTPATIENT)
Dept: DERMATOLOGY | Facility: CLINIC | Age: 57
End: 2022-06-13
Attending: INTERNAL MEDICINE
Payer: COMMERCIAL

## 2022-06-13 DIAGNOSIS — L40.9 PSORIASIS: Primary | ICD-10-CM

## 2022-06-13 DIAGNOSIS — K50.113 CROHN'S DISEASE OF LARGE INTESTINE WITH FISTULA (H): ICD-10-CM

## 2022-06-13 LAB
GAMMA INTERFERON BACKGROUND BLD IA-ACNC: 0.07 IU/ML
M TB IFN-G BLD-IMP: NEGATIVE
M TB IFN-G CD4+ BCKGRND COR BLD-ACNC: 9.93 IU/ML
MITOGEN IGNF BCKGRD COR BLD-ACNC: -0.02 IU/ML
MITOGEN IGNF BCKGRD COR BLD-ACNC: 0.01 IU/ML
QUANTIFERON MITOGEN: 10 IU/ML
QUANTIFERON NIL TUBE: 0.07 IU/ML
QUANTIFERON TB1 TUBE: 0.05 IU/ML
QUANTIFERON TB2 TUBE: 0.08

## 2022-06-13 PROCEDURE — 99203 OFFICE O/P NEW LOW 30 MIN: CPT | Performed by: PHYSICIAN ASSISTANT

## 2022-06-13 RX ORDER — TRIAMCINOLONE ACETONIDE 1 MG/G
CREAM TOPICAL
Qty: 80 G | Refills: 3 | Status: SHIPPED | OUTPATIENT
Start: 2022-06-13 | End: 2024-06-30

## 2022-06-13 ASSESSMENT — PAIN SCALES - GENERAL: PAINLEVEL: MILD PAIN (2)

## 2022-06-13 NOTE — PROGRESS NOTES
HPI:   Chief complaints: Abiola Matute is a pleasant 56 year old female who presents for evaluation of a persistent rash in the belly button and buttocks. She has had this for several months. It improves with her infliximab infusion which she receives for Crohn's disease. About 10 days prior to infusion it starts to flare and then calms down. She has not tried any medications for this. She does have a fhx of psoriasis in her father. She is a current tanning bed user.       PHYSICAL EXAM:    [facility] 05/31/2006   Skin exam performed as follows: Type 3 skin. Mood appropriate  Alert and Oriented X 3. Well developed, well nourished in no distress.  General appearance: Normal  Head including face: Normal  Eyes: conjunctiva and lids: Normal  Mouth: Lips, teeth, gums: Normal  Neck: Normal  Cardiovascular: Exam of peripheral vascular system by observation for swelling, varicosities, edema: Normal  Right upper extremity: Normal  Left upper extremity: Normal  Right lower extremity: Normal  Left lower extremity: Normal  Skin: Scalp and body hair: See below    Dermatitis on the sacral area, intergluteal fold with fissuring; umbilicus, bilateral elbows and bilateral heels. Few areas on the lower legs    ASSESSMENT/PLAN:     1. Psoriasis - discussed diagnosis and treatment options  --Start TAC cream BID x 1-2 weeks PRN flares  2. Dangers of tanning dicussed - advised to stop this immediately and to try self jeanne in lieu of tanning beds.        Follow-up: PRN  CC:   Scribed By: Susanna Palencia, MS, PALILY

## 2022-06-13 NOTE — LETTER
6/13/2022         RE: Abiola Matute  [address] [address]        Dear Colleague,    Thank you for referring your patient, Abiola Matute, to the [facility]. Please see a copy of my visit note below.    HPI:   Chief complaints: Abiola Matute is a pleasant 56 year old female who presents for evaluation of a persistent rash in the belly button and buttocks. She has had this for several months. It improves with her infliximab infusion which she receives for Crohn's disease. About 10 days prior to infusion it starts to flare and then calms down. She has not tried any medications for this. She does have a fhx of psoriasis in her father. She is a current tanning bed user.       PHYSICAL EXAM:    LMP 05/31/2006   Skin exam performed as follows: Type 3 skin. Mood appropriate  Alert and Oriented X 3. Well developed, well nourished in no distress.  General appearance: Normal  Head including face: Normal  Eyes: conjunctiva and lids: Normal  Mouth: Lips, teeth, gums: Normal  Neck: Normal  Cardiovascular: Exam of peripheral vascular system by observation for swelling, varicosities, edema: Normal  Right upper extremity: Normal  Left upper extremity: Normal  Right lower extremity: Normal  Left lower extremity: Normal  Skin: Scalp and body hair: See below    Dermatitis on the sacral area, intergluteal fold with fissuring; umbilicus, bilateral elbows and bilateral heels. Few areas on the lower legs    ASSESSMENT/PLAN:     1. Psoriasis - discussed diagnosis and treatment options  --Start TAC cream BID x 1-2 weeks PRN flares  2. Dangers of tanning dicussed - advised to stop this immediately and to try self jeanne in lieu of tanning beds.        Follow-up: PRN  CC:   Scribed By: Susanna Palencia, MS, MAREN          Again, thank you for allowing me to participate in the care of your patient.        Sincerely,        Susanna Palencia PA-C

## 2022-06-15 ENCOUNTER — TELEPHONE (OUTPATIENT)
Dept: FAMILY MEDICINE | Facility: CLINIC | Age: 57
End: 2022-06-15

## 2022-06-15 NOTE — TELEPHONE ENCOUNTER
Situation  Patient is calling about lab results, saw the neg T result on MyChart but is wondering about the rest of her standing labs that are drawn for Dr Schilling for her Chron's infusions.     Background  Last infusion was 6/10/22   Next June 29 th.     Per patient 5 tunes of blood were drawn at her lab only appointment 6/10   standing orders are in epic for CRP, sed rate and hepatic panel were not processed.      Patient is unavailable to come back for labs until 6/27    Writer will check with lab to see if labs can be added/processed and will call the patient back.     Please call patient back if the labs can not be added   Ok to leave a detailed voicemail,     Olivia DORADO RN   [facility] Triage

## 2022-06-16 ENCOUNTER — ANCILLARY PROCEDURE (OUTPATIENT)
Dept: MRI IMAGING | Facility: CLINIC | Age: 57
End: 2022-06-16
Attending: INTERNAL MEDICINE
Payer: COMMERCIAL

## 2022-06-16 DIAGNOSIS — K50.113 CROHN'S DISEASE OF LARGE INTESTINE WITH FISTULA (H): ICD-10-CM

## 2022-06-16 PROCEDURE — 74183 MRI ABD W/O CNTR FLWD CNTR: CPT

## 2022-06-16 PROCEDURE — A9585 GADOBUTROL INJECTION: HCPCS | Performed by: INTERNAL MEDICINE

## 2022-06-16 PROCEDURE — 72197 MRI PELVIS W/O & W/DYE: CPT

## 2022-06-16 PROCEDURE — 255N000002 HC RX 255 OP 636: Performed by: INTERNAL MEDICINE

## 2022-06-16 RX ORDER — GADOBUTROL 604.72 MG/ML
9 INJECTION INTRAVENOUS ONCE
Status: COMPLETED | OUTPATIENT
Start: 2022-06-16 | End: 2022-06-16

## 2022-06-16 RX ADMIN — GADOBUTROL 10 ML: 604.72 INJECTION INTRAVENOUS at 11:46

## 2022-06-16 NOTE — TELEPHONE ENCOUNTER
Talked with [facility] lab, staff stated standing orders have .    Dr. Hernandez needs to place new standing orders. February orders were one time only.     Called patient and left a detailed voicemail (ok per phone call)  Stating per our lab staff TB standing order is current but all others standing labs have  and Dr. Hernandez needs to reorder.   If further labs are needed they would have to be drawn, can not be added to blood already drawn.   Advised patient to call back with further questions or concerns/gave clinic phone #       Olivia DORADO RN   [facility] Triage

## 2022-06-30 ENCOUNTER — TELEPHONE (OUTPATIENT)
Dept: GASTROENTEROLOGY | Facility: CLINIC | Age: 57
End: 2022-06-30

## 2022-06-30 NOTE — TELEPHONE ENCOUNTER
[facility] Call Center    Phone Message    May a detailed message be left on voicemail: yes     Reason for Call: Other:      Pt stated that they have labs done every other infusion. They said the last romeo they went in there were no orders and that the blood that was drawn was not processed. Pt is requesting a call back to discuss future lab appts and orders.    Action Taken: Message routed to:  [facility] Gastro    Travel Screening: Not Applicable

## 2022-07-05 NOTE — TELEPHONE ENCOUNTER
Contacted patient to discuss labs   Also patient cancelled her appointment in June due to emergency  Rescheduled for December will need to find a new appt spot earlier   Send a my chart message

## 2022-07-08 ENCOUNTER — PATIENT OUTREACH (OUTPATIENT)
Dept: GASTROENTEROLOGY | Facility: CLINIC | Age: 57
End: 2022-07-08

## 2022-07-08 DIAGNOSIS — K50.113 CROHN'S DISEASE OF LARGE INTESTINE WITH FISTULA (H): Primary | ICD-10-CM

## 2022-07-08 NOTE — TELEPHONE ENCOUNTER
Patient returned call to discuss labs and moving up her visit with Dr Schilling   Patient had to cancel her last visit with Dr Schilling due to family issues that patient had to travel outstate so could not complete the visit.   Patient now rescheduled   Her labs were drawn but lab said labs were not active   New orders placed   Patient has labs done day or two before her infusion   Patient will complete right before her next infusion  At this time will keep the December appt also in case patient due to personal issues can not keep the October appt.

## 2022-07-15 ENCOUNTER — LAB (OUTPATIENT)
Dept: LAB | Facility: CLINIC | Age: 57
End: 2022-07-15
Payer: COMMERCIAL

## 2022-07-15 DIAGNOSIS — K50.113 CROHN'S DISEASE OF LARGE INTESTINE WITH FISTULA (H): ICD-10-CM

## 2022-07-15 LAB
BASOPHILS # BLD AUTO: 0 10E3/UL (ref 0–0.2)
BASOPHILS NFR BLD AUTO: 1 %
CRP SERPL-MCNC: 3.52 MG/L
EOSINOPHIL # BLD AUTO: 0.5 10E3/UL (ref 0–0.7)
EOSINOPHIL NFR BLD AUTO: 12 %
ERYTHROCYTE [DISTWIDTH] IN BLOOD BY AUTOMATED COUNT: 14.6 % (ref 10–15)
ERYTHROCYTE [SEDIMENTATION RATE] IN BLOOD BY WESTERGREN METHOD: 36 MM/HR (ref 0–30)
HCT VFR BLD AUTO: 39.8 % (ref 35–47)
HGB BLD-MCNC: 13.6 G/DL (ref 11.7–15.7)
IMM GRANULOCYTES # BLD: 0 10E3/UL
IMM GRANULOCYTES NFR BLD: 0 %
LYMPHOCYTES # BLD AUTO: 1.1 10E3/UL (ref 0.8–5.3)
LYMPHOCYTES NFR BLD AUTO: 27 %
MCH RBC QN AUTO: 35.5 PG (ref 26.5–33)
MCHC RBC AUTO-ENTMCNC: 34.2 G/DL (ref 31.5–36.5)
MCV RBC AUTO: 104 FL (ref 78–100)
MONOCYTES # BLD AUTO: 0.4 10E3/UL (ref 0–1.3)
MONOCYTES NFR BLD AUTO: 10 %
NEUTROPHILS # BLD AUTO: 2.1 10E3/UL (ref 1.6–8.3)
NEUTROPHILS NFR BLD AUTO: 49 %
PLATELET # BLD AUTO: 98 10E3/UL (ref 150–450)
RBC # BLD AUTO: 3.83 10E6/UL (ref 3.8–5.2)
WBC # BLD AUTO: 4.2 10E3/UL (ref 4–11)

## 2022-07-15 PROCEDURE — 80076 HEPATIC FUNCTION PANEL: CPT

## 2022-07-15 PROCEDURE — 86140 C-REACTIVE PROTEIN: CPT

## 2022-07-15 PROCEDURE — 85025 COMPLETE CBC W/AUTO DIFF WBC: CPT

## 2022-07-15 PROCEDURE — 36415 COLL VENOUS BLD VENIPUNCTURE: CPT

## 2022-07-15 PROCEDURE — 85652 RBC SED RATE AUTOMATED: CPT

## 2022-07-16 LAB
ALBUMIN SERPL-MCNC: 3.6 G/DL (ref 3.4–5)
ALP SERPL-CCNC: 95 U/L (ref 40–150)
ALT SERPL W P-5'-P-CCNC: 52 U/L (ref 0–50)
AST SERPL W P-5'-P-CCNC: 74 U/L (ref 0–45)
BILIRUB DIRECT SERPL-MCNC: 0.8 MG/DL (ref 0–0.2)
BILIRUB SERPL-MCNC: 2.4 MG/DL (ref 0.2–1.3)
PROT SERPL-MCNC: 8.1 G/DL (ref 6.8–8.8)

## 2022-08-10 ENCOUNTER — PATIENT OUTREACH (OUTPATIENT)
Dept: GASTROENTEROLOGY | Facility: CLINIC | Age: 57
End: 2022-08-10

## 2022-08-10 NOTE — TELEPHONE ENCOUNTER
Patient left a voice mail message that she need a letter she missed a concert due to her ibd  Need more information to facilitate letter.

## 2022-08-17 ENCOUNTER — PATIENT OUTREACH (OUTPATIENT)
Dept: GASTROENTEROLOGY | Facility: CLINIC | Age: 57
End: 2022-08-17

## 2022-09-08 ENCOUNTER — DOCUMENTATION ONLY (OUTPATIENT)
Dept: GASTROENTEROLOGY | Facility: CLINIC | Age: 57
End: 2022-09-08

## 2022-09-08 NOTE — PROGRESS NOTES
Received fax from [facility]. Plan of treatment 9/10/2022 to 3/9/23. Printed and placed in provider folder to sign.

## 2022-09-14 ENCOUNTER — TRANSFERRED RECORDS (OUTPATIENT)
Dept: HEALTH INFORMATION MANAGEMENT | Facility: CLINIC | Age: 57
End: 2022-09-14

## 2022-09-28 ENCOUNTER — TELEPHONE (OUTPATIENT)
Dept: SURGERY | Facility: CLINIC | Age: 57
End: 2022-09-28

## 2022-09-28 NOTE — TELEPHONE ENCOUNTER
[facility] Call Center    Phone Message    May a detailed message be left on voicemail: yes     Reason for Call: Other: Pt is requesting a call back please to discuss if she is needing to be seen in clinic again before setting up the seton replacement. Please advise. Thank you!     Action Taken: Message routed to:  [facility] Colon and Rectal     Travel Screening: Not Applicable

## 2022-10-03 ENCOUNTER — LAB (OUTPATIENT)
Dept: LAB | Facility: CLINIC | Age: 57
End: 2022-10-03
Payer: COMMERCIAL

## 2022-10-03 ENCOUNTER — PATIENT OUTREACH (OUTPATIENT)
Dept: GASTROENTEROLOGY | Facility: CLINIC | Age: 57
End: 2022-10-03

## 2022-10-03 DIAGNOSIS — K60.30 ANAL FISTULA: Primary | ICD-10-CM

## 2022-10-03 DIAGNOSIS — K50.113 CROHN'S DISEASE OF LARGE INTESTINE WITH FISTULA (H): Primary | ICD-10-CM

## 2022-10-03 LAB
ALBUMIN SERPL-MCNC: 3.3 G/DL (ref 3.4–5)
ALP SERPL-CCNC: 105 U/L (ref 40–150)
ALT SERPL W P-5'-P-CCNC: 83 U/L (ref 0–50)
AST SERPL W P-5'-P-CCNC: 106 U/L (ref 0–45)
BASOPHILS # BLD AUTO: 0 10E3/UL (ref 0–0.2)
BASOPHILS NFR BLD AUTO: 1 %
BILIRUB DIRECT SERPL-MCNC: 0.7 MG/DL (ref 0–0.2)
BILIRUB SERPL-MCNC: 1.6 MG/DL (ref 0.2–1.3)
CRP SERPL-MCNC: 4.35 MG/L
EOSINOPHIL # BLD AUTO: 0.5 10E3/UL (ref 0–0.7)
EOSINOPHIL NFR BLD AUTO: 14 %
ERYTHROCYTE [DISTWIDTH] IN BLOOD BY AUTOMATED COUNT: 13.1 % (ref 10–15)
ERYTHROCYTE [SEDIMENTATION RATE] IN BLOOD BY WESTERGREN METHOD: 47 MM/HR (ref 0–30)
HCT VFR BLD AUTO: 43 % (ref 35–47)
HGB BLD-MCNC: 14.6 G/DL (ref 11.7–15.7)
IMM GRANULOCYTES # BLD: 0 10E3/UL
IMM GRANULOCYTES NFR BLD: 0 %
LYMPHOCYTES # BLD AUTO: 1.1 10E3/UL (ref 0.8–5.3)
LYMPHOCYTES NFR BLD AUTO: 28 %
MCH RBC QN AUTO: 35.6 PG (ref 26.5–33)
MCHC RBC AUTO-ENTMCNC: 34 G/DL (ref 31.5–36.5)
MCV RBC AUTO: 105 FL (ref 78–100)
MONOCYTES # BLD AUTO: 0.4 10E3/UL (ref 0–1.3)
MONOCYTES NFR BLD AUTO: 11 %
NEUTROPHILS # BLD AUTO: 1.8 10E3/UL (ref 1.6–8.3)
NEUTROPHILS NFR BLD AUTO: 46 %
PLATELET # BLD AUTO: 140 10E3/UL (ref 150–450)
PROT SERPL-MCNC: 8 G/DL (ref 6.8–8.8)
RBC # BLD AUTO: 4.1 10E6/UL (ref 3.8–5.2)
WBC # BLD AUTO: 3.8 10E3/UL (ref 4–11)

## 2022-10-03 PROCEDURE — 80230 DRUG ASSAY INFLIXIMAB: CPT | Mod: 90

## 2022-10-03 PROCEDURE — 80076 HEPATIC FUNCTION PANEL: CPT

## 2022-10-03 PROCEDURE — 99000 SPECIMEN HANDLING OFFICE-LAB: CPT

## 2022-10-03 PROCEDURE — 36415 COLL VENOUS BLD VENIPUNCTURE: CPT | Mod: 90

## 2022-10-03 PROCEDURE — 85025 COMPLETE CBC W/AUTO DIFF WBC: CPT

## 2022-10-03 PROCEDURE — 82542 COL CHROMOTOGRAPHY QUAL/QUAN: CPT | Mod: 90

## 2022-10-03 PROCEDURE — 85652 RBC SED RATE AUTOMATED: CPT

## 2022-10-03 PROCEDURE — 86140 C-REACTIVE PROTEIN: CPT

## 2022-10-03 NOTE — TELEPHONE ENCOUNTER
ORDERED EUA WITH SETON EXCHANGE IN OR PER Lisa Marcial NP. ORDERED UNDER Dr. Dugan, GAVE PATIENT SURGERY SCHEDULING PHONE NUMBER.

## 2022-10-04 ENCOUNTER — TELEPHONE (OUTPATIENT)
Dept: SURGERY | Facility: CLINIC | Age: 57
End: 2022-10-04

## 2022-10-04 ENCOUNTER — VIRTUAL VISIT (OUTPATIENT)
Dept: GASTROENTEROLOGY | Facility: CLINIC | Age: 57
End: 2022-10-04
Payer: COMMERCIAL

## 2022-10-04 VITALS — WEIGHT: 178 LBS | BODY MASS INDEX: 28.73 KG/M2

## 2022-10-04 DIAGNOSIS — K50.113 CROHN'S DISEASE OF LARGE INTESTINE WITH FISTULA (H): Primary | ICD-10-CM

## 2022-10-04 PROBLEM — K60.30 ANAL FISTULA: Status: ACTIVE | Noted: 2022-10-04

## 2022-10-04 PROCEDURE — 99214 OFFICE O/P EST MOD 30 MIN: CPT | Mod: GT | Performed by: INTERNAL MEDICINE

## 2022-10-04 ASSESSMENT — PAIN SCALES - GENERAL: PAINLEVEL: NO PAIN (0)

## 2022-10-04 NOTE — PROGRESS NOTES
IBD CLINIC VISIT    CC/REFERRING MD:  Referred Self  REASON FOR CONSULTATION: Crohn's.     ASSESSMENT/PLAN:  57 year old female with cirrhosis and Crohn's    1.  Crohn's disease:   Current Medications:     - Infliximab 10 mg/kg every 4 weeks 11/2020.  Current disease activity: HBI: 8 - unclear how much symptoms related to inflammation.   Last endoscopic disease activity: 3/1/21: Normal flex sig.   Last radiographic disease activity: Normal MRE 6/16/22    Infliximab history- started May 2019. Dose increase to 10 mg/kg every 8 weeks but had continued active disease, dose increase to 10 mg/kg every 4 weeks 11/2020.    Crohn's disease remains in remission at this time.  She has predominately had perianal disease which is not active currently.  Her seton fell out but she is not having any abscess or new fistula development.  She is working on scheduling an exam under anesthesia for seton replacement.    Luminal Crohn's disease without activity on MR enterography.  I do not think that further dose escalation of infliximab will benefit her.  I think her symptoms are not likely from intestinal inflammation.    -- Colorectal surgery follow-up for seton exchange  -- Continue infliximab at current dose  -- Routine labs with infliximab, every other infusion    2. Cirrhosis: Stable.   -- Follow-up in the liver clinic as scheduled.     3. Umbilical drainage: Eval with dermatology presumed psoriasis.  Discussed the potential of drug-induced psoriasis.  However given the benefit of infliximab for her perianal disease would not stop infliximab unless psoriasis progressed.  There is no evidence of fistula on MRE  -- Dermatology follow-up.     Cancer Screening:  Colon cancer screening: Does not clearly have colonic disease. Tentative plan for repeat colonoscopy in 2024.     Misc:  -- Avoid tobacco use  -- Avoid NSAIDs as there is potentially a 25% chance of causing an IBD flare    Return to clinic in 6 months    Thank you for this  consultation.  It was a pleasure to participate in the care of this patient; please contact us with any further questions.      This note was created with voice recognition software, and while reviewed for accuracy, typos may remain.     Farhan Schilling MD MS    [facility]  Inflammatory Bowel Disease Program  Division of Gastroenterology, Hepatology and Nutrition  Pager: [phone]    IBD HISTORY  Age at diagnosis: 53 (4/2019)  Extent of disease: miguel angel-anal, anal  Disease phenotype: Miguel Angel-anal fistula  Miguel Angel-anal disease: Yes  Prior IBD surgeries: No. Seton placements  Prior IBD Medications: None    DRUG MONITORING  TPMT enzyme activity:     6-TGN/6-MMPN levels:    Biologic concentration:  10/2/2019: IFX 0.8, anti-IFX antibodies: 451 (Esoterix)  1/22/2020 infliximab level 2.3, no antibodies (this is an 8-week trough at 10 mg/kg)  3/4/21 IFX 25.1, no antibodies (4 week trough on 10mg/kg)    sIBDQ:   IBDQ Score Date IBDQ - Total Score  (Higher score better)   9/30/2022 49       HPI:   Overall is doing OK. Here for follow-up.     Her GI symptoms are stable. About three weeks after her infusion will have increasing frequency and then develop canker sores.     2-3 weeks after infusion - 2-5 non-diarrheal stools.   3 weeks +: looser stools, and more frequent.     No new fistula, but current seton fell out. No active miguel angel-anal disease.     HBI:  Overall patient well being (prior day): 1 (below average)  Abdominal pain (prior day): 0 (None)  Number of liquid or soft stools (prior day): 5 (1 point per stool)  Abdominal mass on exam: 0 (None)  Complications (1 point for each):   Arthralgia  Aphthous ulcers     Remission <5  Mild activity 5-7  Moderate activity 8-16  Severe > 16    ROS:    Constitutional, HEENT, cardiovascular, pulmonary, GI, , musculoskeletal, neuro, skin, endocrine and psych systems are negative, except as otherwise noted.     PERTINENT PAST MEDICAL HISTORY:  Past Medical History:    Diagnosis Date     Alcohol abuse      Alcoholic cirrhosis of liver with ascites      Atypical ductal hyperplasia of breast 09/10/2010    ERT not recommended -left - and flat epithelial atypia-scheduled for breast biopsy 9/17/2010      Bifid uvula      Cholelithiasis      Contact perianal dermatitis and other eczema     recurrent - clobetasol      Crohn disease      Fear of flying      - gets Ativan prn.      Hypertension      IBS (irritable bowel syndrome)        PREVIOUS SURGERIES:  Past Surgical History:   Procedure Laterality Date     BIOPSY ANAL N/A 3/28/2019    anal biopsy and culure placement of seton - Dr Fleming     BIOPSY BREAST Left 09/17/2010    - scheduled with Dr. Varma      BIOPSY LIVER  2019     COLONOSCOPY  2006     COLONOSCOPY N/A 12/23/2014    Procedure: COMBINED COLONOSCOPY, SINGLE OR MULTIPLE BIOPSY/POLYPECTOMY BY BIOPSY;  Surgeon: Diane Fleming MD;  Location:  GI     COLONOSCOPY N/A 12/5/2019    Procedure: COLONOSCOPY, WITH POLYPECTOMY AND BIOPSY;  Surgeon: Farhan Schilling MD;  Location:  GI     ENDOSCOPIC RETROGRADE CHOLANGIOPANCREATOGRAM N/A 4/24/2020    Procedure: ENDOSCOPIC RETROGRADE CHOLANGIOPANCREATOGRAPHY WITH, sledge removal,sphincterotomy, stent in gallbladder and pancreatic duct stent, and balloon dilation;  Surgeon: Guru Isac Kraft MD;  Location:  OR     ESOPHAGOSCOPY, GASTROSCOPY, DUODENOSCOPY (EGD), COMBINED N/A 12/5/2019    Procedure: ESOPHAGOGASTRODUODENOSCOPY (EGD);  Surgeon: Farhan Schilling MD;  Location: [facility]     EXAM UNDER ANESTHESIA ANUS N/A 3/28/2019    Procedure: EXAM UNDER ANESTHESIA ANUS;  Surgeon: Diane Fleming MD;  Location:  OR     EXAM UNDER ANESTHESIA ANUS N/A 2/26/2021    Procedure: EXAM UNDER ANESTHESIA OF ANUS, SETON PLACEMENT, EXCISION OF SKIN BRIDGE;  Surgeon: Avtar Nam MD;  Location: [facility] OR     HYSTERECTOMY, VAGINAL  2006    with Dr. Licha Zhou - with BSO for fibroids      IR GALLBLADDER  DRAIN PLACEMENT  4/22/2020     OPEN REDUCTION INTERNAL FIXATION ANKLE Left at age 27    plates and screws removed at age 36     Pelviscopy with removal of bilateral hydrosalpinges.  04/15/2010     ZZC APPENDECTOMY  at age 14       PREVIOUS ENDOSCOPY:  3/7/19: Flex sig: Colon and rectum appeared normal. 2 large deep ulcers extending from the anal cnaal to left perianal area.     10/23/18: Colonoscopy: Endoscopically normal ileum.  Mild pan colonic congestion with contact friability thought to be related to portal hypertension and thrombocytopenia.  Prior anal verge ulcer is healed    12/23/14: Colonoscopy: Perianal skin tag noted.  Ileum normal, colon normal.    ALLERGIES:     Allergies   Allergen Reactions     Fish Oil      Redness and itching around eye area only - went away when fish oil capsules stopped      Metronidazole      pain/itching     Naphthalenemethylamines      Lamisil = mild urticarial reaction     Ppd [Tuberculin Purified Protein Derivative]      Sulfa Drugs      hives       PERTINENT MEDICATIONS:    Current Outpatient Medications:      acetaminophen (TYLENOL) 325 MG tablet, Take 1-2 tablets (325-650 mg) by mouth every 6 hours as needed for mild pain Take either 325mg every 6 hours or 650mg every 8 hours for pain. Do not exceed 2000mg in 24 hours., Disp: 100 tablet, Rfl: 0     furosemide (LASIX) 40 MG tablet, TAKE 1 TABLET BY MOUTH  DAILY, Disp: 90 tablet, Rfl: 3     inFLIXimab (REMICADE) 100 MG injection, Inject into the vein once for 1 dose, Disp:  , Rfl:      Milk Thistle-Dand-Fennel-Licor (MILK THISTLE XTRA) CAPS capsule, Take 1 capsule by mouth daily, Disp: , Rfl:      Multiple Vitamins-Minerals (MULTIVITAMIN & MINERAL PO), Take  by mouth daily., Disp: , Rfl:      spironolactone (ALDACTONE) 100 MG tablet, TAKE 1 TABLET BY MOUTH  DAILY, Disp: 90 tablet, Rfl: 3     triamcinolone (KENALOG) 0.1 % external cream, Apply to AA BID x 1-2 week then PRN only, Disp: 80 g, Rfl: 3  No current  facility-administered medications for this visit.    Facility-Administered Medications Ordered in Other Visits:      BREEZA Lemon-Lime flavored oral liquid for Neutral Abdominal/Pelvic Imaging 1,000 mL, 1,000 mL, Oral, Once, Farhan Schilling MD     hyoscyamine (LEVSIN/SL) sublingual tablet 125 mcg, 125 mcg, Sublingual, Q4H PRN, Farhan Schilling MD    SOCIAL HISTORY:  Social History     Socioeconomic History     Marital status:      Spouse name: Albino     Number of children: 3     Years of education: 14     Highest education level: Not on file   Occupational History     Occupation: [employer]     Comment:    Tobacco Use     Smoking status: Never Smoker     Smokeless tobacco: Never Used   Substance and Sexual Activity     Alcohol use: Not Currently     Drug use: No     Comment: no herbal meds either     Sexual activity: Yes     Partners: Male     Birth control/protection: Post-menopausal     Comment: chazb had vasectomy   Other Topics Concern      Service No     Blood Transfusions No     Caffeine Concern No     Comment: rarely drinks caffeine     Occupational Exposure Not Asked     Hobby Hazards Not Asked     Sleep Concern Not Asked     Stress Concern Not Asked     Weight Concern Not Asked     Special Diet Not Asked     Back Care Not Asked     Exercise Yes     Comment: does a lot of walking - 4x/week     Bike Helmet Not Asked     Seat Belt Yes     Comment: always     Self-Exams Yes     Comment: SBE encouraged monthly     Parent/sibling w/ CABG, MI or angioplasty before 65F 55M? Yes   Social History Narrative    calcium - drinks 5-6 large glasses skim milk/day    flex sig/colonoscopy -at age 49    sun precautions - discussed    mammogram - needs every 2 years in her 30's, then yearly from then on    Td booster - 9/99 and 4/27/2010    pneumovax -at age 59    DEXA -when perimenopausal    stool hemoccults - every year after age 40    ASA- start at age 39    mulvitamin -  encouraged     Social Determinants of Health     Financial Resource Strain: Not on file   Food Insecurity: Not on file   Transportation Needs: Not on file   Physical Activity: Not on file   Stress: Not on file   Social Connections: Not on file   Intimate Partner Violence: Not on file   Housing Stability: Not on file       FAMILY HISTORY:  Family History   Problem Relation Age of Onset     Breast Cancer Mother      Gastrointestinal Disease Mother      Heart Disease Father 57     Cancer Sister      Skin Cancer Sister      Hypertension Maternal Grandmother      Diabetes Maternal Grandfather      Diabetes Paternal Grandmother      Heart Disease Paternal Grandfather        Past/family/social history reviewed and no changes    PHYSICAL EXAMINATION:  Constitutional: aaox3, cooperative, pleasant, not dyspneic/diaphoretic, no acute distress  Vitals reviewed: Wt 80.7 kg (178 lb)   LMP 05/31/2006   BMI 28.73 kg/m    Wt:   Wt Readings from Last 2 Encounters:   10/04/22 80.7 kg (178 lb)   02/07/22 82.9 kg (182 lb 11.2 oz)      Constitutional - general appearance is well and in no acute distress. Body habitus normal  Eyes - No redness or discharge  Respiratory - No cough, unlabored breathing  Musculoskeletal - range of motion intact: Neck and arms  Skin - No discoloration or lesions  Neurological - No tremors, headaches  Psychiatric - No anxiety, alert & oriented

## 2022-10-04 NOTE — PATIENT INSTRUCTIONS
Overall you are doing well related to your Crohn's disease.  You do have ongoing stable symptoms that may or may not be directly related to Crohn's disease.  I recommend continuing the Remicade to prevent worsening of perianal disease.  Please follow-up with the colorectal surgery team for the exam under anesthesia and seton replacement.    -- Continue Remicade at current dose  -- Routine labs with every other infusion    Follow-up in the IBD program in 6 months

## 2022-10-04 NOTE — TELEPHONE ENCOUNTER
Case Request received to schedule:    EXAM UNDER ANESTHESIA, ANUS, SETON EXCHANGE     Additional Instructions for the Case   Multi-surgeon case:  NO   Time in minutes:  30   Anesthesia: MAC   Prep: 2 FLEET   Pre-Op: PAC   Labs: NO   WOC: NO   Special equipment: NO   Special Instructions: FEB 20 (disregard per KP Clark)    Schedule with Lisa Marcial NP or Molly RAZA 2-3 weeks after surgery.   Schedule with Surgeon 3-4 weeks after Lisa Marcial NP    On 10/4/2022:  Spoke with patient, explained that her Case Request is not 2nd signed and that     Spoke with patient via phone, completed the following scheduling, then later sent Surgery Packet to patient via MyChart and Mail including related scheduling information and prep instructions:     Required: Yes, you will need a  18 years or older to drive you home from your procedure as well as stay with you for 24 hours after your procedure,    Surgery: Exam under anesthesia, anus, seton exchange    Date: Friday November 18, 2022 Surgeon: Dr. Mary Dugan    Time: You will receive a call 1-3 days prior to your surgery with your finalized surgery and arrival time.     Location: [facility] - [address]--[address] [place] [id number]  [phone]      Upcoming Related Appointments and Home COVID Test:     Nov 09, 2022 11:00 AM  (Arrive by 10:45 AM)  PAC EVALUATION with DARYN Agudelo  [facility] [place] ([facility] ) [phone]    [address] [place] [address] [address] [facility] [address]     Pre-operative COVID-19 Home Antigen Test--Instructions:  1. Take a COVID Home Antigen Test 1-2 days before surgery  2. Use your phone to take a photo of the results  3. Show that photo to the admitting nurse on surgery date    Dec 07, 2022 11:00 AM  (Arrive by 10:45 AM)  Post Op with Molly Marinelli  "MAREN  [facility] [place] ([facility] ) [phone]    [address] [place], [place] [id number]     Preparation: 2 Fleet [employer]emas (See \"Day of Surgery\")    Philly Velasquez  Julia-op Coordinator  [facility]  Direct Phone: [phone]      "

## 2022-10-04 NOTE — PROGRESS NOTES
Virginia is a 57 year old who is being evaluated via a billable video visit.      How would you like to obtain your AVS? MyChart  If the video visit is dropped, the invitation should be resent by: Text to cell phone: [phone]  Will anyone else be joining your video visit? No    Video-Visit Details    Video Start Time: 9:28 AM    Type of service:  Video Visit    Video End Time:9: 42 AM    Originating Location (pt. Location): Home    Distant Location (provider location):  [facility] [place]     Platform used for Video Visit: BzzAgent

## 2022-10-04 NOTE — LETTER
10/4/2022         RE: Abiola Matute  [address] [address]        Dear Colleague,    Thank you for referring your patient, Abiola Matute, to the [facility] [place]. Please see a copy of my visit note below.    IBD CLINIC VISIT    CC/REFERRING MD:  Referred Self  REASON FOR CONSULTATION: Crohn's.     ASSESSMENT/PLAN:  57 year old female with cirrhosis and Crohn's    1.  Crohn's disease:   Current Medications:     - Infliximab 10 mg/kg every 4 weeks 11/2020.  Current disease activity: HBI: 8 - unclear how much symptoms related to inflammation.   Last endoscopic disease activity: 3/1/21: Normal flex sig.   Last radiographic disease activity: Normal MRE 6/16/22    Infliximab history- started May 2019. Dose increase to 10 mg/kg every 8 weeks but had continued active disease, dose increase to 10 mg/kg every 4 weeks 11/2020.    Crohn's disease remains in remission at this time.  She has predominately had perianal disease which is not active currently.  Her seton fell out but she is not having any abscess or new fistula development.  She is working on scheduling an exam under anesthesia for seton replacement.    Luminal Crohn's disease without activity on MR enterography.  I do not think that further dose escalation of infliximab will benefit her.  I think her symptoms are not likely from intestinal inflammation.    -- Colorectal surgery follow-up for seton exchange  -- Continue infliximab at current dose  -- Routine labs with infliximab, every other infusion    2. Cirrhosis: Stable.   -- Follow-up in the liver clinic as scheduled.     3. Umbilical drainage: Eval with dermatology presumed psoriasis.  Discussed the potential of drug-induced psoriasis.  However given the benefit of infliximab for her perianal disease would not stop infliximab unless psoriasis progressed.  There is no evidence of fistula on MRE  -- Dermatology follow-up.     Cancer  Attempted to reach patient in regards to appointment. Patient states that she have to call me back because she in the store. Screening:  Colon cancer screening: Does not clearly have colonic disease. Tentative plan for repeat colonoscopy in 2024.     Misc:  -- Avoid tobacco use  -- Avoid NSAIDs as there is potentially a 25% chance of causing an IBD flare    Return to clinic in 6 months    Thank you for this consultation.  It was a pleasure to participate in the care of this patient; please contact us with any further questions.      This note was created with voice recognition software, and while reviewed for accuracy, typos may remain.     Farhan Schilling MD MS    [facility]  Inflammatory Bowel Disease Program  Division of Gastroenterology, Hepatology and Nutrition  Pager: [phone]    IBD HISTORY  Age at diagnosis: 53 (4/2019)  Extent of disease: miguel angel-anal, anal  Disease phenotype: Miguel Angel-anal fistula  Miguel Angel-anal disease: Yes  Prior IBD surgeries: No. Seton placements  Prior IBD Medications: None    DRUG MONITORING  TPMT enzyme activity:     6-TGN/6-MMPN levels:    Biologic concentration:  10/2/2019: IFX 0.8, anti-IFX antibodies: 451 (Esoterix)  1/22/2020 infliximab level 2.3, no antibodies (this is an 8-week trough at 10 mg/kg)  3/4/21 IFX 25.1, no antibodies (4 week trough on 10mg/kg)    sIBDQ:   IBDQ Score Date IBDQ - Total Score  (Higher score better)   9/30/2022 49       HPI:   Overall is doing OK. Here for follow-up.     Her GI symptoms are stable. About three weeks after her infusion will have increasing frequency and then develop canker sores.     2-3 weeks after infusion - 2-5 non-diarrheal stools.   3 weeks +: looser stools, and more frequent.     No new fistula, but current seton fell out. No active miguel angel-anal disease.     HBI:  Overall patient well being (prior day): 1 (below average)  Abdominal pain (prior day): 0 (None)  Number of liquid or soft stools (prior day): 5 (1 point per stool)  Abdominal mass on exam: 0 (None)  Complications (1 point for each):   Arthralgia  Aphthous ulcers     Remission  <5  Mild activity 5-7  Moderate activity 8-16  Severe > 16    ROS:    Constitutional, HEENT, cardiovascular, pulmonary, GI, , musculoskeletal, neuro, skin, endocrine and psych systems are negative, except as otherwise noted.     PERTINENT PAST MEDICAL HISTORY:  Past Medical History:   Diagnosis Date     Alcohol abuse      Alcoholic cirrhosis of liver with ascites      Atypical ductal hyperplasia of breast 09/10/2010    ERT not recommended -left - and flat epithelial atypia-scheduled for breast biopsy 9/17/2010      Bifid uvula      Cholelithiasis      Contact perianal dermatitis and other eczema     recurrent - clobetasol      Crohn disease      Fear of flying      - gets Ativan prn.      Hypertension      IBS (irritable bowel syndrome)        PREVIOUS SURGERIES:  Past Surgical History:   Procedure Laterality Date     BIOPSY ANAL N/A 3/28/2019    anal biopsy and culure placement of seton - Dr Fleming     BIOPSY BREAST Left 09/17/2010    - scheduled with Dr. Varma      BIOPSY LIVER  2019     COLONOSCOPY  2006     COLONOSCOPY N/A 12/23/2014    Procedure: COMBINED COLONOSCOPY, SINGLE OR MULTIPLE BIOPSY/POLYPECTOMY BY BIOPSY;  Surgeon: Diane Fleming MD;  Location:  GI     COLONOSCOPY N/A 12/5/2019    Procedure: COLONOSCOPY, WITH POLYPECTOMY AND BIOPSY;  Surgeon: Farhan Schilling MD;  [facility]     ENDOSCOPIC RETROGRADE CHOLANGIOPANCREATOGRAM N/A 4/24/2020    Procedure: ENDOSCOPIC RETROGRADE CHOLANGIOPANCREATOGRAPHY WITH, sledge removal,sphincterotomy, stent in gallbladder and pancreatic duct stent, and balloon dilation;  Surgeon: Guru Isac Kraft MD;  Location: [facility] OR     ESOPHAGOSCOPY, GASTROSCOPY, DUODENOSCOPY (EGD), COMBINED N/A 12/5/2019    Procedure: ESOPHAGOGASTRODUODENOSCOPY (EGD);  Surgeon: Farhan Schilling MD;  Location:  GI     EXAM UNDER ANESTHESIA ANUS N/A 3/28/2019    Procedure: EXAM UNDER ANESTHESIA ANUS;  Surgeon: Diane Fleming MD;  Location:   OR     EXAM UNDER ANESTHESIA ANUS N/A 2/26/2021    Procedure: EXAM UNDER ANESTHESIA OF ANUS, SETON PLACEMENT, EXCISION OF SKIN BRIDGE;  Surgeon: Avtar Nam MD;  Location: [facility] OR     HYSTERECTOMY, VAGINAL  2006    with Dr. Licha Zhou - with BSO for fibroids      IR GALLBLADDER DRAIN PLACEMENT  4/22/2020     OPEN REDUCTION INTERNAL FIXATION ANKLE Left at age 27    plates and screws removed at age 36     Pelviscopy with removal of bilateral hydrosalpinges.  04/15/2010     ZZC APPENDECTOMY  at age 14       PREVIOUS ENDOSCOPY:  3/7/19: Flex sig: Colon and rectum appeared normal. 2 large deep ulcers extending from the anal cnaal to left perianal area.     10/23/18: Colonoscopy: Endoscopically normal ileum.  Mild pan colonic congestion with contact friability thought to be related to portal hypertension and thrombocytopenia.  Prior anal verge ulcer is healed    12/23/14: Colonoscopy: Perianal skin tag noted.  Ileum normal, colon normal.    ALLERGIES:     Allergies   Allergen Reactions     Fish Oil      Redness and itching around eye area only - went away when fish oil capsules stopped      Metronidazole      pain/itching     Naphthalenemethylamines      Lamisil = mild urticarial reaction     Ppd [Tuberculin Purified Protein Derivative]      Sulfa Drugs      hives       PERTINENT MEDICATIONS:    Current Outpatient Medications:      acetaminophen (TYLENOL) 325 MG tablet, Take 1-2 tablets (325-650 mg) by mouth every 6 hours as needed for mild pain Take either 325mg every 6 hours or 650mg every 8 hours for pain. Do not exceed 2000mg in 24 hours., Disp: 100 tablet, Rfl: 0     furosemide (LASIX) 40 MG tablet, TAKE 1 TABLET BY MOUTH  DAILY, Disp: 90 tablet, Rfl: 3     inFLIXimab (REMICADE) 100 MG injection, Inject into the vein once for 1 dose, Disp:  , Rfl:      Milk Thistle-Dand-Fennel-Licor (MILK THISTLE XTRA) CAPS capsule, Take 1 capsule by mouth daily, Disp: , Rfl:      Multiple Vitamins-Minerals (MULTIVITAMIN & MINERAL  PO), Take  by mouth daily., Disp: , Rfl:      spironolactone (ALDACTONE) 100 MG tablet, TAKE 1 TABLET BY MOUTH  DAILY, Disp: 90 tablet, Rfl: 3     triamcinolone (KENALOG) 0.1 % external cream, Apply to AA BID x 1-2 week then PRN only, Disp: 80 g, Rfl: 3  No current facility-administered medications for this visit.    Facility-Administered Medications Ordered in Other Visits:      BREEZA Lemon-Lime flavored oral liquid for Neutral Abdominal/Pelvic Imaging 1,000 mL, 1,000 mL, Oral, Once, Farhan Schilling MD     hyoscyamine (LEVSIN/SL) sublingual tablet 125 mcg, 125 mcg, Sublingual, Q4H PRN, Farhan Schilling MD    SOCIAL HISTORY:  Social History     Socioeconomic History     Marital status:      Spouse name: Albino     Number of children: 3     Years of education: 14     Highest education level: Not on file   Occupational History     Occupation: [employer]     Comment:    Tobacco Use     Smoking status: Never Smoker     Smokeless tobacco: Never Used   Substance and Sexual Activity     Alcohol use: Not Currently     Drug use: No     Comment: no herbal meds either     Sexual activity: Yes     Partners: Male     Birth control/protection: Post-menopausal     Comment: husb had vasectomy   Other Topics Concern      Service No     Blood Transfusions No     Caffeine Concern No     Comment: rarely drinks caffeine     Occupational Exposure Not Asked     Hobby Hazards Not Asked     Sleep Concern Not Asked     Stress Concern Not Asked     Weight Concern Not Asked     Special Diet Not Asked     Back Care Not Asked     Exercise Yes     Comment: does a lot of walking - 4x/week     Bike Helmet Not Asked     Seat Belt Yes     Comment: always     Self-Exams Yes     Comment: SBE encouraged monthly     Parent/sibling w/ CABG, MI or angioplasty before 65F 55M? Yes   Social History Narrative    calcium - drinks 5-6 large glasses skim milk/day    flex sig/colonoscopy -at age 49    sun  precautions - discussed    mammogram - needs every 2 years in her 30's, then yearly from then on    Td booster - 9/99 and 4/27/2010    pneumovax -at age 59    DEXA -when perimenopausal    stool hemoccults - every year after age 40    ASA- start at age 40    mulvitamin - encouraged     Social Determinants of Health     Financial Resource Strain: Not on file   Food Insecurity: Not on file   Transportation Needs: Not on file   Physical Activity: Not on file   Stress: Not on file   Social Connections: Not on file   Intimate Partner Violence: Not on file   Housing Stability: Not on file       FAMILY HISTORY:  Family History   Problem Relation Age of Onset     Breast Cancer Mother      Gastrointestinal Disease Mother      Heart Disease Father 57     Cancer Sister      Skin Cancer Sister      Hypertension Maternal Grandmother      Diabetes Maternal Grandfather      Diabetes Paternal Grandmother      Heart Disease Paternal Grandfather        Past/family/social history reviewed and no changes    PHYSICAL EXAMINATION:  Constitutional: aaox3, cooperative, pleasant, not dyspneic/diaphoretic, no acute distress  Vitals reviewed: Wt 80.7 kg (178 lb)   LMP 05/31/2006   BMI 28.73 kg/m    Wt:   Wt Readings from Last 2 Encounters:   10/04/22 80.7 kg (178 lb)   02/07/22 82.9 kg (182 lb 11.2 oz)      Constitutional - general appearance is well and in no acute distress. Body habitus normal  Eyes - No redness or discharge  Respiratory - No cough, unlabored breathing  Musculoskeletal - range of motion intact: Neck and arms  Skin - No discoloration or lesions  Neurological - No tremors, headaches  Psychiatric - No anxiety, alert & oriented         Sincerely,    Farhanyordy Schilling MD

## 2022-10-05 NOTE — TELEPHONE ENCOUNTER
FUTURE VISIT INFORMATION      SURGERY INFORMATION:    Date: 22    Location:  or    Surgeon:  Mary Dugan MD    Anesthesia Type:  MAC    Procedure: EXAM UNDER ANESTHESIA, ANUS, SETON EXCHANGE    RECORDS REQUESTED FROM:        Primary Care Provider: Linda Macdonald APRN [facility]    Pertinent Medical History: hypertension    Most recent EKG+ Tracin19

## 2022-10-07 ENCOUNTER — PATIENT OUTREACH (OUTPATIENT)
Dept: GASTROENTEROLOGY | Facility: CLINIC | Age: 57
End: 2022-10-07

## 2022-10-07 NOTE — PROGRESS NOTES
Spoke with [facility]  (O) [phone]  (F) [phone]    She is asking to get POT form sign and fax back to her or pt will be out of compliance.

## 2022-10-10 ENCOUNTER — TRANSFERRED RECORDS (OUTPATIENT)
Dept: HEALTH INFORMATION MANAGEMENT | Facility: CLINIC | Age: 57
End: 2022-10-10

## 2022-10-10 ENCOUNTER — PATIENT OUTREACH (OUTPATIENT)
Dept: GASTROENTEROLOGY | Facility: CLINIC | Age: 57
End: 2022-10-10

## 2022-10-10 NOTE — PROGRESS NOTES
Received faxed POT form from [facility]. Need provider signature and date.    Please faxed to [phone].

## 2022-10-13 LAB
INFLIXIMAB AB SERPL IA-MCNC: <3.1 U/ML
INFLIXIMAB SERPL-MCNC: >34 UG/ML

## 2022-10-16 ENCOUNTER — HEALTH MAINTENANCE LETTER (OUTPATIENT)
Age: 57
End: 2022-10-16

## 2022-10-19 ENCOUNTER — DOCUMENTATION ONLY (OUTPATIENT)
Dept: GASTROENTEROLOGY | Facility: CLINIC | Age: 57
End: 2022-10-19

## 2022-10-19 ENCOUNTER — TRANSFERRED RECORDS (OUTPATIENT)
Dept: HEALTH INFORMATION MANAGEMENT | Facility: CLINIC | Age: 57
End: 2022-10-19

## 2022-10-19 NOTE — PROGRESS NOTES
Received fax from [facility]. Plan of treatment.  Printed and placed in provider folder to sign.

## 2022-10-24 ENCOUNTER — TRANSFERRED RECORDS (OUTPATIENT)
Dept: HEALTH INFORMATION MANAGEMENT | Facility: CLINIC | Age: 57
End: 2022-10-24

## 2022-10-25 ENCOUNTER — TELEPHONE (OUTPATIENT)
Dept: GASTROENTEROLOGY | Facility: CLINIC | Age: 57
End: 2022-10-25

## 2022-10-25 NOTE — TELEPHONE ENCOUNTER
October 25, 2022    Called Nichol Left message and contact info to return call regarding: infusion

## 2022-10-25 NOTE — TELEPHONE ENCOUNTER
[facility] Call Center    Phone Message    May a detailed message be left on voicemail: yes     Reason for Call: Other: Nichol returning a call, she is  requesting  a call back from [place] RN.  Please call her back at [phone].    Action Taken: Other: [facility] gi    Travel Screening: Not Applicable

## 2022-10-25 NOTE — TELEPHONE ENCOUNTER
Health Call Center    Phone Message    May a detailed message be left on voicemail: yes     Reason for Call: Other:      Nichol from [facility] is requesting an order to infuse early. Caller stated pt is due  to have their next infusion on 11/21/2022, but is requestiing to have the infusion on 11/17/2022. Caller is requesting the orders faxed. Thank you.    [phone]-Fax    Action Taken: Message routed to:  [facility]    Travel Screening: Not Applicable

## 2022-10-26 NOTE — TELEPHONE ENCOUNTER
Nichol called to request therapy orders to infuse 4 days early. Pt is scheduled for 11/21/22 but is requesting to be infuse on 11/17/22.    RN confirm that we are ok with infusing early as long as insurance approve.    Hello Team.  Please fax Remicade therapy plan to [facility] at [phone].    Thank you.  Remi

## 2022-10-28 ENCOUNTER — PATIENT OUTREACH (OUTPATIENT)
Dept: GASTROENTEROLOGY | Facility: CLINIC | Age: 57
End: 2022-10-28

## 2022-11-01 NOTE — TELEPHONE ENCOUNTER
November 1, 2022    Called Nichol Left message and contact info to return call regarding: remicade therapy plan

## 2022-11-09 ENCOUNTER — TELEPHONE (OUTPATIENT)
Dept: SURGERY | Facility: CLINIC | Age: 57
End: 2022-11-09

## 2022-11-09 ENCOUNTER — PRE VISIT (OUTPATIENT)
Dept: SURGERY | Facility: CLINIC | Age: 57
End: 2022-11-09

## 2022-11-09 NOTE — TELEPHONE ENCOUNTER
Received message that patient missed her PAC and Lab appointments today.     PAC tried calling patientkarla for return call to call back to reschedule her missed appointments.    I called patient, left vm msg stating that she had missed very important appointments today, and requested that she call me back asap to reschedule.    I see that patient scheduled a Pre-op H&P with her PCP on 11/15/2022. Dr. Mary Dugan specified that patient needs to see the PAC Clinic for Pre-op H&P (NOT PCP). Sent message to KP Mullins, asking if seeing her PCP would be ok. Awaiting response.    KP Mullins, responded that patient needs to see PAC due to Liver Disease.    Patient called back, I explained the above,     Philly Velasquez  Julia-op Coordinator  [place]  Direct Phone: [phone]

## 2022-11-09 NOTE — TELEPHONE ENCOUNTER
[facility] Call Center    Phone Message    May a detailed message be left on voicemail: yes     Reason for Call: Other: Per pt would like to know when she will recieve the packet for prep and more info about whe she should arrive for surgery. Per pt still has not receive anything. Please send out packet asap. Thank you!     Action Taken: Message routed to:  [facility]    Travel Screening: Not Applicable

## 2022-11-11 ENCOUNTER — TELEPHONE (OUTPATIENT)
Dept: FAMILY MEDICINE | Facility: CLINIC | Age: 57
End: 2022-11-11

## 2022-11-11 ENCOUNTER — OFFICE VISIT (OUTPATIENT)
Dept: FAMILY MEDICINE | Facility: CLINIC | Age: 57
End: 2022-11-11
Payer: COMMERCIAL

## 2022-11-11 VITALS
WEIGHT: 179.4 LBS | TEMPERATURE: 97.3 F | HEART RATE: 80 BPM | RESPIRATION RATE: 16 BRPM | SYSTOLIC BLOOD PRESSURE: 122 MMHG | BODY MASS INDEX: 28.96 KG/M2 | DIASTOLIC BLOOD PRESSURE: 72 MMHG | OXYGEN SATURATION: 100 %

## 2022-11-11 DIAGNOSIS — Z00.00 ROUTINE GENERAL MEDICAL EXAMINATION AT A HEALTH CARE FACILITY: Primary | ICD-10-CM

## 2022-11-11 DIAGNOSIS — Z13.220 SCREENING FOR LIPID DISORDERS: ICD-10-CM

## 2022-11-11 DIAGNOSIS — Z12.31 ENCOUNTER FOR SCREENING MAMMOGRAM FOR BREAST CANCER: ICD-10-CM

## 2022-11-11 DIAGNOSIS — Z13.1 SCREENING FOR DIABETES MELLITUS: ICD-10-CM

## 2022-11-11 LAB
ANION GAP SERPL CALCULATED.3IONS-SCNC: 8 MMOL/L (ref 3–14)
BUN SERPL-MCNC: 12 MG/DL (ref 7–30)
CALCIUM SERPL-MCNC: 9.2 MG/DL (ref 8.5–10.1)
CHLORIDE BLD-SCNC: 102 MMOL/L (ref 94–109)
CHOLEST SERPL-MCNC: 114 MG/DL
CO2 SERPL-SCNC: 26 MMOL/L (ref 20–32)
CREAT SERPL-MCNC: 0.63 MG/DL (ref 0.52–1.04)
CREAT UR-MCNC: 26 MG/DL
FASTING STATUS PATIENT QL REPORTED: YES
GFR SERPL CREATININE-BSD FRML MDRD: >90 ML/MIN/1.73M2
GLUCOSE BLD-MCNC: 99 MG/DL (ref 70–99)
HDLC SERPL-MCNC: 47 MG/DL
LDLC SERPL CALC-MCNC: 52 MG/DL
MICROALBUMIN UR-MCNC: <5 MG/L
MICROALBUMIN/CREAT UR: NORMAL MG/G{CREAT}
NONHDLC SERPL-MCNC: 67 MG/DL
POTASSIUM BLD-SCNC: 3.7 MMOL/L (ref 3.4–5.3)
SODIUM SERPL-SCNC: 136 MMOL/L (ref 133–144)
TRIGL SERPL-MCNC: 75 MG/DL

## 2022-11-11 PROCEDURE — 99396 PREV VISIT EST AGE 40-64: CPT | Mod: 25 | Performed by: NURSE PRACTITIONER

## 2022-11-11 PROCEDURE — 36415 COLL VENOUS BLD VENIPUNCTURE: CPT | Performed by: NURSE PRACTITIONER

## 2022-11-11 PROCEDURE — 90677 PCV20 VACCINE IM: CPT | Performed by: NURSE PRACTITIONER

## 2022-11-11 PROCEDURE — 90471 IMMUNIZATION ADMIN: CPT | Performed by: NURSE PRACTITIONER

## 2022-11-11 PROCEDURE — 82043 UR ALBUMIN QUANTITATIVE: CPT | Performed by: NURSE PRACTITIONER

## 2022-11-11 PROCEDURE — 90682 RIV4 VACC RECOMBINANT DNA IM: CPT | Performed by: NURSE PRACTITIONER

## 2022-11-11 PROCEDURE — 80061 LIPID PANEL: CPT | Performed by: NURSE PRACTITIONER

## 2022-11-11 PROCEDURE — 80048 BASIC METABOLIC PNL TOTAL CA: CPT | Performed by: NURSE PRACTITIONER

## 2022-11-11 PROCEDURE — 90472 IMMUNIZATION ADMIN EACH ADD: CPT | Performed by: NURSE PRACTITIONER

## 2022-11-11 ASSESSMENT — ENCOUNTER SYMPTOMS
CHILLS: 0
EYE PAIN: 0
COUGH: 0
PARESTHESIAS: 0
SHORTNESS OF BREATH: 0
SORE THROAT: 0
DIZZINESS: 0
CONSTIPATION: 0
WEAKNESS: 0
FEVER: 0
HEADACHES: 0
HEMATOCHEZIA: 0
ABDOMINAL PAIN: 0
NERVOUS/ANXIOUS: 0
DYSURIA: 0
FREQUENCY: 0
PALPITATIONS: 0
DIARRHEA: 0
NAUSEA: 0
ARTHRALGIAS: 1
JOINT SWELLING: 0
HEMATURIA: 0
MYALGIAS: 1

## 2022-11-11 ASSESSMENT — ACTIVITIES OF DAILY LIVING (ADL)
CURRENT_FUNCTION: HOUSEWORK REQUIRES ASSISTANCE
CURRENT_FUNCTION: LAUNDRY REQUIRES ASSISTANCE
CURRENT_FUNCTION: SHOPPING REQUIRES ASSISTANCE

## 2022-11-11 NOTE — TELEPHONE ENCOUNTER
"Spoke with patient again, she would like to reschedule surgery to 1/6/2023. Surgery and all related appointments rescheduled with patient via phone.    Spoke with patient via phone, completed the following rescheduling, then later sent Surgery Packet to patient via MyChart and Mail including related scheduling information and prep instructions:     Required: Yes, you will need a  18 years or older to drive you home from your procedure as well as stay with you for 24 hours after your procedure    Surgery: EXAM UNDER ANESTHESIA, ANUS, SETON EXCHANGE    Date: Friday January 6, 2023 Surgeon: Dr. Mary Dugan    Time: You will receive a call 1-3 days prior to your surgery with your finalized surgery and arrival time.     Location: [facility] - [address] [place] [address]  [phone]      Upcoming Related Appointments:     - Pre-operative History & Physical was completed with PAC via Video Visit on December 14, 2022 Jan 20, 2023 10:30 AM  (Arrive by 10:15 AM)  Post Op with Molly Marinelli PA-C  [facility] [place] ([facility] ) [phone]    [address] [place] [address] [address] [facility] [address]     Preparation: 2 Fleet Enemas (See \"Day of Surgery\")    Philly Velasquez  Julia-op Coordinator  [place]  Direct Phone: [phone]    "

## 2022-11-11 NOTE — PROGRESS NOTES
"   SUBJECTIVE:   CC: Virginia is an 57 year old who presents for preventive health visit.     Patient has been advised of split billing requirements and indicates understanding: Yes  Healthy Habits:     In general, how would you rate your overall health?  Fair    Frequency of exercise:  2-3 days/week    Duration of exercise:  15-30 minutes    Do you usually eat at least 4 servings of fruit and vegetables a day, include whole grains    & fiber and avoid regularly eating high fat or \"junk\" foods?  Yes    Taking medications regularly:  Yes    Medication side effects:  Muscle aches    Ability to successfully perform activities of daily living:  Shopping requires assistance, housework requires assistance and laundry requires assistance    Home Safety:  No safety concerns identified    Hearing Impairment:  No hearing concerns    In the past 6 months, have you been bothered by leaking of urine? Yes    In general, how would you rate your overall mental or emotional health?  Good      PHQ-2 Total Score: 0    Additional concerns today:  No        Today's PHQ-2 Score:   PHQ-2 ( 1999 Pfizer) 11/11/2022   Q1: Little interest or pleasure in doing things 0   Q2: Feeling down, depressed or hopeless 0   PHQ-2 Score 0   PHQ-2 Total Score (12-17 Years)- Positive if 3 or more points; Administer PHQ-A if positive -   Q1: Little interest or pleasure in doing things Not at all   Q2: Feeling down, depressed or hopeless Not at all   PHQ-2 Score 0       Abuse: Current or Past (Physical, Sexual or Emotional) - No  Do you feel safe in your environment? Yes        Social History     Tobacco Use     Smoking status: Never     Smokeless tobacco: Never   Substance Use Topics     Alcohol use: Not Currently     If you drink alcohol do you typically have >3 drinks per day or >7 drinks per week? No    Alcohol Use 11/11/2022   Prescreen: >3 drinks/day or >7 drinks/week? No   Prescreen: >3 drinks/day or >7 drinks/week? -   No flowsheet data " found.    Reviewed orders with patient.  Reviewed health maintenance and updated orders accordingly - Yes  BP Readings from Last 3 Encounters:   11/11/22 122/72   02/07/22 121/84   09/15/21 110/72    Wt Readings from Last 3 Encounters:   11/11/22 81.4 kg (179 lb 6.4 oz)   10/04/22 80.7 kg (178 lb)   02/07/22 82.9 kg (182 lb 11.2 oz)                  Patient Active Problem List   Diagnosis     Non morbid obesity due to excess calories     Other isolated or specific phobias     Other congenital malformations of mouth     Contact dermatitis and other eczema, due to unspecified cause     Intestinal infection due to Clostridium difficile     Iron deficiency anemia, unspecified iron deficiency anemia type     Rosacea     Atypical ductal hyperplasia of left breast     Idiopathic thrombocytopenia (H)     Postmenopausal- s/p hysterectomy with BSO - not on HRT secondary to atypical ductal hyperplasia & breast pain -no paps needed     Claustrophobia     S/P total hysterectomy and bilateral salpingo-oophorectomy     Abnormal liver enzymes- ? related to ongoing etoh use/abuse     Essential hypertension with goal blood pressure less than 140/90     Gastroenteritis     Stool incontinence     CARDIOVASCULAR SCREENING; LDL GOAL LESS THAN 130     AA (alcohol abuse) - ? ongoing      Alcoholic fatty liver     Acquired hyperbilirubinemia- ? secondary to alcohol use     Diffuse nodular cirrhosis of liver (H)     Severe Perianal Crohn's Disease     Biliary colic     Cholecystitis     Alcoholic cirrhosis of liver with ascites (H) - seeing [facility]      Abscess of anal or rectal region     Anal fistula     Past Surgical History:   Procedure Laterality Date     BIOPSY ANAL N/A 3/28/2019    anal biopsy and culure placement of seton - Dr Fleming     BIOPSY BREAST Left 09/17/2010    - scheduled with Dr. Varma      BIOPSY LIVER  2019     COLONOSCOPY  2006     COLONOSCOPY N/A 12/23/2014    Procedure: COMBINED COLONOSCOPY, SINGLE OR MULTIPLE  BIOPSY/POLYPECTOMY BY BIOPSY;  Surgeon: Diane Fleming MD;  Location:  GI     COLONOSCOPY N/A 12/5/2019    Procedure: COLONOSCOPY, WITH POLYPECTOMY AND BIOPSY;  Surgeon: Farhan Schilling MD;  [facility]     ENDOSCOPIC RETROGRADE CHOLANGIOPANCREATOGRAM N/A 4/24/2020    Procedure: ENDOSCOPIC RETROGRADE CHOLANGIOPANCREATOGRAPHY WITH, sledge removal,sphincterotomy, stent in gallbladder and pancreatic duct stent, and balloon dilation;  Surgeon: Guru Isac Kraft MD;  Location:  OR     ESOPHAGOSCOPY, GASTROSCOPY, DUODENOSCOPY (EGD), COMBINED N/A 12/5/2019    Procedure: ESOPHAGOGASTRODUODENOSCOPY (EGD);  Surgeon: Farhan Schilling MD;  [facility]     EXAM UNDER ANESTHESIA ANUS N/A 3/28/2019    Procedure: EXAM UNDER ANESTHESIA ANUS;  Surgeon: Diane Fleming MD;  Location:  OR     EXAM UNDER ANESTHESIA ANUS N/A 2/26/2021    Procedure: EXAM UNDER ANESTHESIA OF ANUS, SETON PLACEMENT, EXCISION OF SKIN BRIDGE;  Surgeon: Avtar Nam MD;  [place] [facility] [place]     HYSTERECTOMY, VAGINAL  2006    with Dr. Licha Zhou - with BSO for fibroids      IR GALLBLADDER DRAIN PLACEMENT  4/22/2020     OPEN REDUCTION INTERNAL FIXATION ANKLE Left at age 27    plates and screws removed at age 36     Pelviscopy with removal of bilateral hydrosalpinges.  04/15/2010     ZZC APPENDECTOMY  at age 14       Social History     Tobacco Use     Smoking status: Never     Smokeless tobacco: Never   Substance Use Topics     Alcohol use: Not Currently     Family History   Problem Relation Age of Onset     Breast Cancer Mother      Gastrointestinal Disease Mother      Heart Disease Father 57     Cancer Sister      Skin Cancer Sister      Hypertension Maternal Grandmother      Diabetes Maternal Grandfather      Diabetes Paternal Grandmother      Heart Disease Paternal Grandfather          Current Outpatient Medications   Medication Sig Dispense Refill     furosemide (LASIX) 40 MG tablet TAKE 1 TABLET BY  MOUTH  DAILY 90 tablet 3     inFLIXimab (REMICADE) 100 MG injection Inject into the vein once for 1 dose       Milk Thistle-Dand-Fennel-Licor (MILK THISTLE XTRA) CAPS capsule Take 1 capsule by mouth daily       Multiple Vitamins-Minerals (MULTIVITAMIN & MINERAL PO) Take  by mouth daily.       spironolactone (ALDACTONE) 100 MG tablet TAKE 1 TABLET BY MOUTH  DAILY 90 tablet 3     triamcinolone (KENALOG) 0.1 % external cream Apply to AA BID x 1-2 week then PRN only 80 g 3       Breast Cancer Screening:    FHS-7:   Breast CA Risk Assessment (S-7) 9/10/2021 11/11/2022   Did any of your first-degree relatives have breast or ovarian cancer? Yes Yes   Did any of your relatives have bilateral breast cancer? No No   Did any man in your family have breast cancer? No No   Did any woman in your family have breast and ovarian cancer? Yes Yes   Did any woman in your family have breast cancer before age 50 y? No No   Do you have 2 or more relatives with breast and/or ovarian cancer? No No   Do you have 2 or more relatives with breast and/or bowel cancer? No No       Mammogram Screening: Recommended mammography every 1-2 years with patient discussion and risk factor consideration  Pertinent mammograms are reviewed under the imaging tab.    History of abnormal Pap smear: Status post benign hysterectomy. Health Maintenance and Surgical History updated.    PAP / HPV 9/19/2011 8/24/2010 12/6/2005   PAP (Historical) NIL ASC-US(A) ASC-US(A)     Reviewed and updated as needed this visit by clinical staff   Tobacco  Allergies  Meds   Med Hx  Surg Hx  Fam Hx  Soc Hx        Reviewed and updated as needed this visit by Provider     Meds             Past Medical History:   Diagnosis Date     Alcohol abuse      Alcoholic cirrhosis of liver with ascites      Anal fistula      Atypical ductal hyperplasia of breast 09/10/2010    ERT not recommended -left - and flat epithelial atypia-scheduled for breast biopsy 9/17/2010      Bifid uvula       Cholelithiasis      Contact perianal dermatitis and other eczema     recurrent - clobetasol      Crohn disease      Fear of flying      - gets Ativan prn.      Hypertension      IBS (irritable bowel syndrome)       Past Surgical History:   Procedure Laterality Date     BIOPSY ANAL N/A 3/28/2019    anal biopsy and culure placement of seton - Dr Fleming     BIOPSY BREAST Left 09/17/2010    - scheduled with Dr. Varma      BIOPSY LIVER  2019     COLONOSCOPY  2006     COLONOSCOPY N/A 12/23/2014    Procedure: COMBINED COLONOSCOPY, SINGLE OR MULTIPLE BIOPSY/POLYPECTOMY BY BIOPSY;  Surgeon: Diane Fleming MD;  Location:  GI     COLONOSCOPY N/A 12/5/2019    Procedure: COLONOSCOPY, WITH POLYPECTOMY AND BIOPSY;  Surgeon: Farhan Schilling MD;  [facility]     ENDOSCOPIC RETROGRADE CHOLANGIOPANCREATOGRAM N/A 4/24/2020    Procedure: ENDOSCOPIC RETROGRADE CHOLANGIOPANCREATOGRAPHY WITH, sledge removal,sphincterotomy, stent in gallbladder and pancreatic duct stent, and balloon dilation;  Surgeon: Guru Isac Kraft MD;  [facility]     ESOPHAGOSCOPY, GASTROSCOPY, DUODENOSCOPY (EGD), COMBINED N/A 12/5/2019    Procedure: ESOPHAGOGASTRODUODENOSCOPY (EGD);  Surgeon: Farhan Schilling MD;  [facility]     EXAM UNDER ANESTHESIA ANUS N/A 3/28/2019    Procedure: EXAM UNDER ANESTHESIA ANUS;  Surgeon: Diane Fleming MD;  Location:  OR     EXAM UNDER ANESTHESIA ANUS N/A 2/26/2021    Procedure: EXAM UNDER ANESTHESIA OF ANUS, SETON PLACEMENT, EXCISION OF SKIN BRIDGE;  Surgeon: Avtar Nam MD;  [place] [facility] [place]     HYSTERECTOMY, VAGINAL  2006    with Dr. Licha Zhou - with BSO for fibroids      IR GALLBLADDER DRAIN PLACEMENT  4/22/2020     OPEN REDUCTION INTERNAL FIXATION ANKLE Left at age 27    plates and screws removed at age 36     Pelviscopy with removal of bilateral hydrosalpinges.  04/15/2010     ZZC APPENDECTOMY  at age 14       Review of Systems   Constitutional: Negative  for chills and fever.   HENT: Negative for congestion, ear pain, hearing loss and sore throat.    Eyes: Negative for pain and visual disturbance.   Respiratory: Negative for cough and shortness of breath.    Cardiovascular: Negative for chest pain, palpitations and peripheral edema.   Gastrointestinal: Negative for abdominal pain, constipation, diarrhea, hematochezia and nausea.   Genitourinary: Negative for dysuria, frequency, genital sores, hematuria and urgency.   Musculoskeletal: Positive for arthralgias and myalgias. Negative for joint swelling.   Skin: Negative for rash.   Neurological: Negative for dizziness, weakness, headaches and paresthesias.   Psychiatric/Behavioral: Negative for mood changes. The patient is not nervous/anxious.    Worsening joint pain.      OBJECTIVE:   /72   Pulse 80   Temp 97.3  F (36.3  C) (Oral)   Resp 16   Wt 81.4 kg (179 lb 6.4 oz)   LMP 05/31/2006   SpO2 100%   BMI 28.96 kg/m    Physical Exam  GENERAL: healthy, alert and no distress  EYES: Eyes grossly normal to inspection  HENT: ear canals and TM's normal, nose and mouth without ulcers or lesions  NECK: no adenopathy, no asymmetry  RESP: lungs clear to auscultation - no rales, rhonchi or wheezes  CV: regular rate and rhythm, normal S1 S2, no S3 or S4, no murmur, click or rub, no peripheral edema  ABDOMEN: soft, nontender, no hepatosplenomegaly, no masses and bowel sounds normal  MS: no gross musculoskeletal defects noted, no edema  SKIN: no suspicious lesions or rashes  NEURO: Normal strength and tone, mentation intact and speech normal  PSYCH: mentation appears normal, affect normal/bright      ASSESSMENT/PLAN:     Abiola was seen today for physical.    Diagnoses and all orders for this visit:    Routine general medical examination at a health care facility  -     Albumin Random Urine Quantitative with Creat Ratio    Encounter for screening mammogram for breast cancer  -     MA SCREENING DIGITAL BILAT - Future   "(s+30); Future    Screening for diabetes mellitus  -     Basic metabolic panel  (Ca, Cl, CO2, Creat, Gluc, K, Na, BUN)    Screening for lipid disorders  -     Lipid panel reflex to direct LDL Fasting    Other orders  -     Pneumococcal 20 Valent Conjugate (Prevnar 20)  -     INFLUENZA QUAD, RECOMBINANT, P-FREE (RIV4) (FLUBLOK) AGE 50-64 [HQS557]  -     REVIEW OF HEALTH MAINTENANCE PROTOCOL ORDERS            COUNSELING:  Reviewed preventive health counseling, as reflected in patient instructions    Estimated body mass index is 28.96 kg/m  as calculated from the following:    Height as of 2/7/22: 1.676 m (5' 6\").    Weight as of this encounter: 81.4 kg (179 lb 6.4 oz).      She reports that she has never smoked. She has never used smokeless tobacco.      Counseling Resources:  ATP IV Guidelines  Pooled Cohorts Equation Calculator  Breast Cancer Risk Calculator  BRCA-Related Cancer Risk Assessment: FHS-7 Tool  FRAX Risk Assessment  ICSI Preventive Guidelines  Dietary Guidelines for Americans, 2010  USDA's MyPlate  ASA Prophylaxis  Lung CA Screening    Linda Macdonald, DARYN [facility]  "

## 2022-11-14 NOTE — RESULT ENCOUNTER NOTE
Dear [place],     -HDL(good) cholesterol level is slightly low.  Your LDL(bad) cholesterol and trigylceride levels are normal.  ADVISE: exercising 150 minutes of aerobic exercise per week (30 minutes 5 days per week or 50 minutes 3 days per week are options), and omega-3 fatty acids (fish oil) 1310-0498 mg daily are helpful to improve this.  In 12 months, you should recheck your fasting cholesterol panel.    -Kidney function is normal (Cr, GFR), Sodium is normal, Potassium is normal, Calcium is normal, Glucose is normal.     -Microalbumin (urine protein) test is normal.  ADVISE: rechecking this annually.      Please send a [employer] message or call [phone]  if you have any questions.      DARYN Lucas, CNP  [facility]    If you have further questions about the interpretation of your labs, [email or web address] is a good website to check out for further information.

## 2022-11-14 NOTE — TELEPHONE ENCOUNTER
FUTURE VISIT INFORMATION        SURGERY INFORMATION:    Date: 22    Location:  or    Surgeon:  Mary Dugan MD    Anesthesia Type:  MAC    Procedure: EXAM UNDER ANESTHESIA, ANUS, SETON EXCHANGE     RECORDS REQUESTED FROM:          Primary Care Provider: Linda Macdonald APRN [facility]     Pertinent Medical History: hypertension     Most recent EKG+ Tracin19

## 2022-11-25 ENCOUNTER — TELEPHONE (OUTPATIENT)
Dept: FAMILY MEDICINE | Facility: CLINIC | Age: 57
End: 2022-11-25

## 2022-11-25 NOTE — TELEPHONE ENCOUNTER
Patient calls to ask that her physical paperwork/form from her  11- appointment be pulled and that the LDL be filled in and re faxed. Patient also asking for a copy to be mailed to her.        Best number to call back [phone]    Patient also stated that she was advised to take fish oil supplement but she is allergic to that and ask for an alternative . She also wants to make sure that fish oil is listed on her chart as having a allergy to.

## 2022-11-28 ENCOUNTER — TELEPHONE (OUTPATIENT)
Dept: FAMILY MEDICINE | Facility: CLINIC | Age: 57
End: 2022-11-28

## 2022-11-28 NOTE — TELEPHONE ENCOUNTER
Reason for Call:  Form, our goal is to have forms completed with 72 hours, however, some forms may require a visit or additional information.    Type of letter, form or note:  work     Who is the form from?: Patient    Where did the form come from: Patient or family brought in       What clinic location was the form placed at?: [facility]    Where the form was placed: Linda Macdonald Box/Folder    What number is listed as a contact on the form?: [phone]       Additional comments: Mail copy to patient & fax to [facility]    Call taken on 11/28/2022 at 4:37 PM by Palmira Keys

## 2022-11-28 NOTE — TELEPHONE ENCOUNTER
Patient dropped off new form to be completed, please see note about fish oil  And advise       Maria C Polo

## 2022-11-29 NOTE — TELEPHONE ENCOUNTER
Form completed by provider, pt. Signature needed. VM left informing Pt. Of this need.     letter left at the front reception desk.    Kuldeep Rodriguez

## 2022-11-29 NOTE — TELEPHONE ENCOUNTER
Form completed.      No need/indication for fish oil or alternative at this time.      Allergy is up to date on chart.      - Mariela, CNP

## 2022-11-30 ENCOUNTER — ANCILLARY PROCEDURE (OUTPATIENT)
Dept: MAMMOGRAPHY | Facility: CLINIC | Age: 57
End: 2022-11-30
Attending: NURSE PRACTITIONER
Payer: COMMERCIAL

## 2022-11-30 DIAGNOSIS — Z12.31 ENCOUNTER FOR SCREENING MAMMOGRAM FOR BREAST CANCER: ICD-10-CM

## 2022-11-30 PROCEDURE — 77067 SCR MAMMO BI INCL CAD: CPT | Mod: TC | Performed by: RADIOLOGY

## 2022-11-30 PROCEDURE — 77063 BREAST TOMOSYNTHESIS BI: CPT | Mod: TC | Performed by: RADIOLOGY

## 2022-12-01 NOTE — RESULT ENCOUNTER NOTE
Dear [place],     -Mammogram was normal.  ADVISE: rechecking in 1 year.      Please send a [employer] message or call [phone]  if you have any questions.      DARYN Lucas, CNP  [facility]    If you have further questions about the interpretation of your labs, [email or web address] is a good website to check out for further information.

## 2022-12-14 ENCOUNTER — ANESTHESIA EVENT (OUTPATIENT)
Dept: SURGERY | Facility: CLINIC | Age: 57
End: 2022-12-14

## 2022-12-14 ENCOUNTER — VIRTUAL VISIT (OUTPATIENT)
Dept: SURGERY | Facility: CLINIC | Age: 57
End: 2022-12-14
Payer: COMMERCIAL

## 2022-12-14 ENCOUNTER — PRE VISIT (OUTPATIENT)
Dept: SURGERY | Facility: CLINIC | Age: 57
End: 2022-12-14

## 2022-12-14 DIAGNOSIS — K60.30 ANAL FISTULA: ICD-10-CM

## 2022-12-14 DIAGNOSIS — Z01.818 PREOP EXAMINATION: Primary | ICD-10-CM

## 2022-12-14 DIAGNOSIS — K70.31 ALCOHOLIC CIRRHOSIS OF LIVER WITH ASCITES (H): ICD-10-CM

## 2022-12-14 PROCEDURE — 99215 OFFICE O/P EST HI 40 MIN: CPT | Mod: GT | Performed by: NURSE PRACTITIONER

## 2022-12-14 ASSESSMENT — COPD QUESTIONNAIRES: COPD: 0

## 2022-12-14 ASSESSMENT — PAIN SCALES - GENERAL: PAINLEVEL: SEVERE PAIN (7)

## 2022-12-14 ASSESSMENT — LIFESTYLE VARIABLES: TOBACCO_USE: 0

## 2022-12-14 ASSESSMENT — ENCOUNTER SYMPTOMS: ORTHOPNEA: 0

## 2022-12-14 NOTE — PATIENT INSTRUCTIONS
Preparing for Your Surgery      Name:  Abiola Matute   MRN:  [id number]   :  1965   Today's Date:  2022         Arriving for surgery:  Surgery date:  2023  Arrival time:  8:00 am    Restrictions due to COVID 19:    Effective 2022:  2 visitors may accompany patient and wait in the Surgery Waiting Room.  All visitors must wear a mask and social distance.      parking is available for anyone with mobility limitations or disabilities. (Monday- Friday 7 am- 5 pm)    Please come to:    [facility]  [address] [place] [id number]    Please check in on the 5th floor at the [facility]ulatory Surgery Center.      What can I eat or drink?    -  You may eat and drink normally until 8 hours prior to arrival  time. (Until Midnight)  -  You may have clear liquids until 2 hours prior to arrival  time. (Until 6 am)    Examples of clear liquids:  Water  Clear broth  Juices (apple, white grape, white cranberry  and cider) without pulp  Noncarbonated, powder based beverages  (lemonade and Rey-Aid)  Sodas (Sprite, 7-Up, ginger ale and seltzer)  Coffee or tea (without milk or cream)  Gatorade    --No alcohol for at least 24 hours before surgery.    Which medicines can I take?    Hold Aspirin for 7 days before surgery.   Hold Multivitamins for 7 days before surgery.  Hold Supplements for 7 days before surgery. (Milk Thistle)  Hold Ibuprofen (Advil, Motrin) for 1 day before surgery--unless otherwise directed by surgeon.  Hold Naproxen (Aleve) for 4 days before surgery.      -  DO NOT take the following medications the day of surgery:  Furosemide   Spironolactone      -  PLEASE TAKE the following medications the day of surgery:   NONE      How do I prepare myself?  - Please take 2 showers before surgery using Scrubcare or Hibiclens soap.    Use this soap only from the neck to your toes.     Leave the soap on your skin for one minute--then rinse thoroughly.      You may use your  own shampoo and conditioner. No other hair products.   - Please remove all jewelry and body piercings.  - No lotions, deodorants or fragrance.  - No makeup or fingernail polish.   - Bring your ID and insurance card.    Administer Fleet Enemas as directed     -If you have a Deep Brain Stimulator, a Spinal Cord Stimulator, or any implanted Neuro Device, you must bring the remote to the Surgery Center.         ALL PATIENTS ARE REQUIRED TO HAVE A RESPONSIBLE ADULT TO DRIVE AND BE IN ATTENDANCE WITH THEM FOR 24 HOURS FOLLOWING SURGERY.     Covid testing policy as of 12/06/2022  Your surgeon will notify and schedule you for a COVID test if one is needed before surgery--please direct any questions or COVID symptoms to your surgeon      Questions or Concerns:    -For questions regarding the day of surgery, please contact the Ambulatory Surgery Center at [phone].    -If you have health changes between today and your surgery, please contact your surgeon.     - For questions after surgery, please contact your surgeon's office.            THE DAY OF SURGERY     Bowel Preparation:  FLEET ENEMA INSTRUCTIONS     2 hours prior to your arrival time for procedure:     1. Remove orange protective shield from enema Comfortip before inserting.     2. With steady pressure, gently insert enema tip into rectum with a slight side-to-side movement, with tip pointing toward the navel.  Insertion may be easier if you bear down, as if you are having a bowel movement.     3. Do not force the enema tip into rectum, as this can cause injury.     4. squeeze bottle until nearly all liquid is gone.  It is not neccessary to empty the bottle      5. Remove Comforttip from rectum, and maintain position until you feel the urge to evacuate is strong (usually 2-5 minutes).       Repeat the same steps for the second enema 30 minutes after completing the first enema.      For any questions or concerns, please contact our care coordinators- KP Mullins:  [phone], WILLEM OleaN: [phone], or Yue EMT: [phone].

## 2022-12-14 NOTE — H&P
Pre-Operative H & P     CC:  Preoperative exam to assess for increased cardiopulmonary risk while undergoing surgery and anesthesia.    Date of Encounter: 12/14/2022  Primary Care Physician:  Linda Macdonald     Reason for visit:   Encounter Diagnoses   Name Primary?     Preop examination Yes     Anal fistula      Alcoholic cirrhosis of liver with ascites (H) - seeing [facility]         HPI  Abiola Matute is a 57 year old female who presents for pre-operative H & P in preparation for  Procedure Information     Date/Time: 1/6/23 @ 0930     Procedure: EXAM UNDER ANESTHESIA, ANUS, SETON EXCHANGE    Anesthesia type: MAC    Pre-op diagnosis: anal fistula    Location: [facility]    Providers: Dr. Dugan          Abiola Matute is a 57 year old female with hypertension, history of atypical ductal hyperplasia of breast, bifid uvula, thrombocytopenia, crohn's disease. Alcoholic liver cirrhosis with ascites and alcohol dependence in remission that has an anal fistula.  She follows with Dr. Schilling for crohn's disease and is managed with infliximab infusions.  She saw DARYN Preston in the colorectal clinic last on 3/19/21 for anal fistula and had her last seton replacement on 2/26/21.  She called the clinic earlier this past fall to schedule seton replacement.  Procedure planned as above.     History is obtained from the patient and chart review    Hx of abnormal bleeding or anti-platelet use: none    Menstrual history: Patient's last menstrual period was 05/31/2006.:      Past Medical History  Past Medical History:   Diagnosis Date     Alcohol abuse      Alcoholic cirrhosis of liver with ascites      Anal fistula      Atypical ductal hyperplasia of breast 09/10/2010    ERT not recommended -left - and flat epithelial atypia-scheduled for breast biopsy 9/17/2010      Bifid uvula      Cholelithiasis      Contact perianal dermatitis and other eczema     recurrent - clobetasol      Crohn  disease      Fear of flying      - gets Ativan prn.      Hypertension      IBS (irritable bowel syndrome)        Past Surgical History  Past Surgical History:   Procedure Laterality Date     BIOPSY ANAL N/A 3/28/2019    anal biopsy and culure placement of seton - Dr Fleming     BIOPSY BREAST Left 09/17/2010    - scheduled with Dr. Varma      BIOPSY LIVER  2019     COLONOSCOPY  2006     COLONOSCOPY N/A 12/23/2014    Procedure: COMBINED COLONOSCOPY, SINGLE OR MULTIPLE BIOPSY/POLYPECTOMY BY BIOPSY;  Surgeon: Diane Fleming MD;  Location:  GI     COLONOSCOPY N/A 12/5/2019    Procedure: COLONOSCOPY, WITH POLYPECTOMY AND BIOPSY;  Surgeon: Farhan Schilling MD;  Location: [facility]     ENDOSCOPIC RETROGRADE CHOLANGIOPANCREATOGRAM N/A 4/24/2020    Procedure: ENDOSCOPIC RETROGRADE CHOLANGIOPANCREATOGRAPHY WITH, sledge removal,sphincterotomy, stent in gallbladder and pancreatic duct stent, and balloon dilation;  Surgeon: Guru Isac Kraft MD;  Location: [facility] OR     ESOPHAGOSCOPY, GASTROSCOPY, DUODENOSCOPY (EGD), COMBINED N/A 12/5/2019    Procedure: ESOPHAGOGASTRODUODENOSCOPY (EGD);  Surgeon: Farhan Schilling MD;  Location: [facility] GI     EXAM UNDER ANESTHESIA ANUS N/A 3/28/2019    Procedure: EXAM UNDER ANESTHESIA ANUS;  Surgeon: Diane Fleming MD;  Location:  OR     EXAM UNDER ANESTHESIA ANUS N/A 2/26/2021    Procedure: EXAM UNDER ANESTHESIA OF ANUS, SETON PLACEMENT, EXCISION OF SKIN BRIDGE;  Surgeon: Avtar Nam MD;  Location: [facility]     HYSTERECTOMY, VAGINAL  2006    with Dr. Licha Zhou - with BSO for fibroids      IR GALLBLADDER DRAIN PLACEMENT  4/22/2020     OPEN REDUCTION INTERNAL FIXATION ANKLE Left at age 27    plates and screws removed at age 36     Pelviscopy with removal of bilateral hydrosalpinges.  04/15/2010     ZZC APPENDECTOMY  at age 14       Prior to Admission Medications  Current Outpatient Medications   Medication Sig Dispense Refill     furosemide (LASIX) 40 MG  tablet TAKE 1 TABLET BY MOUTH  DAILY (Patient taking differently: Take 40 mg by mouth every morning) 90 tablet 3     inFLIXimab (REMICADE) 100 MG injection Inject into the vein once Next dose 12/16/22       Milk Thistle-Dand-Fennel-Licor (MILK THISTLE XTRA) CAPS capsule Take 1 capsule by mouth 2 times daily       Multiple Vitamins-Minerals (MULTIVITAMIN & MINERAL PO) Take by mouth every morning       spironolactone (ALDACTONE) 100 MG tablet TAKE 1 TABLET BY MOUTH  DAILY (Patient taking differently: Take 100 mg by mouth every morning) 90 tablet 3     triamcinolone (KENALOG) 0.1 % external cream Apply to AA BID x 1-2 week then PRN only 80 g 3       Allergies  Allergies   Allergen Reactions     Fish Oil      Redness and itching around eye area only - went away when fish oil capsules stopped      Metronidazole      pain/itching     Naphthalenemethylamines      Lamisil = mild urticarial reaction     Ppd [Tuberculin Purified Protein Derivative]      Sulfa Drugs      hives       Social History  Social History     Socioeconomic History     Marital status:      Spouse name: Albino     Number of children: 3     Years of education: 14     Highest education level: Not on file   Occupational History     Occupation: unemployed   Tobacco Use     Smoking status: Never     Smokeless tobacco: Never   Vaping Use     Vaping Use: Never used   Substance and Sexual Activity     Alcohol use: Not Currently     Drug use: No     Comment: no herbal meds either     Sexual activity: Yes     Partners: Male     Birth control/protection: Post-menopausal     Comment: harper had vasectomy   Other Topics Concern      Service No     Blood Transfusions No     Caffeine Concern No     Comment: rarely drinks caffeine     Occupational Exposure Not Asked     Hobby Hazards Not Asked     Sleep Concern Not Asked     Stress Concern Not Asked     Weight Concern Not Asked     Special Diet Not Asked     Back Care Not Asked     Exercise Yes     Comment:  does a lot of walking - 4x/week     Bike Helmet Not Asked     Seat Belt Yes     Comment: always     Self-Exams Yes     Comment: SBE encouraged monthly     Parent/sibling w/ CABG, MI or angioplasty before 65F 55M? Yes   Social History Narrative    calcium - drinks 5-6 large glasses skim milk/day    flex sig/colonoscopy -at age 49    sun precautions - discussed    mammogram - needs every 2 years in her 30's, then yearly from then on    Td booster - 9/99 and 4/27/2010    pneumovax -at age 59    DEXA -when perimenopausal    stool hemoccults - every year after age 40    ASA- start at age 39    mulvitamin - encouraged     Social Determinants of Health     Financial Resource Strain: Not on file   Food Insecurity: Not on file   Transportation Needs: Not on file   Physical Activity: Not on file   Stress: Not on file   Social Connections: Not on file   Intimate Partner Violence: Not on file   Housing Stability: Not on file       Family History  Family History   Problem Relation Age of Onset     Breast Cancer Mother      Gastrointestinal Disease Mother      Heart Disease Father 57     Cancer Sister      Skin Cancer Sister      Hypertension Maternal Grandmother      Diabetes Maternal Grandfather      Diabetes Paternal Grandmother      Heart Disease Paternal Grandfather      Anesthesia Reaction No family hx of      Thrombosis No family hx of        Review of Systems  The complete review of systems is negative other than noted in the HPI or here.   Anesthesia Evaluation   Pt has had prior anesthetic.     No history of anesthetic complications       ROS/MED HX  ENT/Pulmonary:     (+) ERIC risk factors, hypertension,  (-) tobacco use, asthma, COPD and recent URI   Neurologic:  - neg neurologic ROS     Cardiovascular:     (+) hypertension-----Previous cardiac testing   Echo: Date: Results:    Stress Test: Date: Results:    ECG Reviewed: Date: 2019 Results:  See H&P  Cath: Date: Results:   (-) [place] and orthopnea/PND   METS/Exercise  Tolerance: >4 METS Comment: Walks on her treadmill a couple times per week (1-3mph) for 15-20 minutes   Hematologic: Comments: Thrombocytopenia    INR elevated   (-) history of blood clots and history of blood transfusion   Musculoskeletal: Comment: Chronic generalized joint pain      GI/Hepatic: Comment: Crohn's has been fairly well controlled with infliximab infusions.    + anal fistula    (+) bowel prep, liver disease,     Renal/Genitourinary:  - neg Renal ROS     Endo:       Psychiatric/Substance Use:     (+) psychiatric history anxiety (periodic situational anxiety) alcohol abuse     Infectious Disease:  - neg infectious disease ROS  (-) Recent Fever   Malignancy:  - neg malignancy ROS     Other: Comment: Eczema on heels     (+) , H/O Chronic Pain,        Virtual visit -  No vitals were obtained    Physical Exam  Constitutional: Awake, alert, cooperative, no apparent distress, and appears stated age.  Eyes: Pupils equal  HENT: Normocephalic  Respiratory: non labored breathing   Neurologic: Awake, alert, oriented to name, place and time.   Neuropsychiatric: Calm, cooperative. Normal affect.      Prior Labs/Diagnostic Studies   All labs and imaging personally reviewed     EKG  2019  NSR     Labs:   Component      Latest Ref Rng & Units 12/16/2022   WBC      4.0 - 11.0 10e3/uL 3.5 (L)   RBC Count      3.80 - 5.20 10e6/uL 3.90   Hemoglobin      11.7 - 15.7 g/dL 14.2   Hematocrit      35.0 - 47.0 % 44.2   MCV      78 - 100 fL 113 (H)   MCH      26.5 - 33.0 pg 36.4 (H)   MCHC      31.5 - 36.5 g/dL 32.1   RDW      10.0 - 15.0 % 14.7   Platelet Count      150 - 450 10e3/uL 96 (L)   % Neutrophils      % 55   % Lymphocytes      % 26   % Monocytes      % 12   % Eosinophils      % 6   % Basophils      % 1   % Immature Granulocytes      % 0   NRBCs per 100 WBC      <1 /100 0   Absolute Neutrophils      1.6 - 8.3 10e3/uL 1.9   Absolute Lymphocytes      0.8 - 5.3 10e3/uL 0.9   Absolute Monocytes      0.0 - 1.3 10e3/uL 0.4    Absolute Eosinophils      0.0 - 0.7 10e3/uL 0.2   Absolute Basophils      0.0 - 0.2 10e3/uL 0.0   Absolute Immature Granulocytes      <=0.4 10e3/uL 0.0   Absolute NRBCs      10e3/uL 0.0   Sodium      136 - 145 mmol/L 137   Potassium      3.4 - 5.3 mmol/L 3.5   Chloride      98 - 107 mmol/L 101   Carbon Dioxide (CO2)      22 - 29 mmol/L 28   Anion Gap      7 - 15 mmol/L 8   Urea Nitrogen      6.0 - 20.0 mg/dL 12.7   Creatinine      0.51 - 0.95 mg/dL 0.83   Calcium      8.6 - 10.0 mg/dL 8.4 (L)   Glucose      70 - 99 mg/dL 112 (H)   Alkaline Phosphatase      35 - 104 U/L 112 (H)   AST      10 - 35 U/L 89 (H)   ALT      10 - 35 U/L 49 (H)   Protein Total      6.4 - 8.3 g/dL 7.9   Albumin      3.5 - 5.2 g/dL 3.6   Bilirubin Total      <=1.2 mg/dL 3.1 (H)   GFR Estimate      >60 mL/min/1.73m2 81   INR      0.85 - 1.15 1.22 (H)       The patient's records and results personally reviewed by this provider.     Outside records reviewed from: Care Everywhere      Assessment      Abiola Matute is a 57 year old female seen as a PAC referral for risk assessment and optimization for anesthesia.    Plan/Recommendations  Pt will be optimized for the proposed procedure.  See below for details on the assessment, risk, and preoperative recommendations    NEUROLOGY  - No history of TIA, CVA or seizure    -Post Op delirium risk factors:  No risk identified    ENT  - No current airway concerns.  Will need to be reassessed day of surgery.  Mallampati: Unable to assess  TM: Unable to assess    CARDIAC  - Hypertension is managed with lasix and aldactone; hold both morning of surgery.      - METS (Metabolic Equivalents) = >4  Patient performs 4 or more METS exercise without symptoms            Total Score: 0      RCRI-Very low risk: Class 1 0.4% complication rate            Total Score: 0        PULMONARY    ERIC Low Risk            Total Score: 2    ERIC: Hypertension    ERIC: Over 50 ys old      - Denies asthma or inhaler use  - Tobacco  "History      History   Smoking Status     Never   Smokeless Tobacco     Never       GI  - denies GERD   - follows with Dr. Cervantes for alcoholic cirrhosis of the liver with ascites.  Hasn't required paracentesis for 4 years.  Ascites is well controlled with lasix and aldactone.  Hold both morning of surgery, but okay to take upon return home.  - CMP ordered  - follows with Dr. Schilling for crohn's disease.  Managed with infliximab infusions.  Next infusion is scheduled for 12/23/22.  - anal fistula - procedure planned as above.     PONV High Risk  Total Score: 3           1 AN PONV: Pt is Female    1 AN PONV: Patient is not a current smoker    1 AN PONV: Intended Post Op Opioids            ENDOCRINE    - BMI: Estimated body mass index is 28.96 kg/m  as calculated from the following:    Height as of 2/7/22: 1.676 m (5' 6\").    Weight as of 11/11/22: 81.4 kg (179 lb 6.4 oz).  Overweight (BMI 25.0-29.9)  - No history of Diabetes Mellitus    HEME  VTE Low Risk 0.26%            Total Score: 0      - No history of abnormal bleeding or antiplatelet use.    - thrombocytopenia secondary to liver disease.  CBC recheck ordered.    - INR chronically elevated secondary to liver disease.  Recheck ordered.        MSK  - has generalized chronic joint pain.  Consider cautious positioning.     PSYCH  - alcohol dependence in remission x approximately 2 years.      Different anesthesia methods/types have been discussed with the patient, but they are aware that the final plan will be decided by the assigned anesthesia provider on the date of service.      The patient is optimized for their procedure. AVS with information on surgery time/arrival time, meds and NPO status given by nursing staff. No further diagnostic testing indicated.    Please refer to the physical examination documented by the anesthesiologist in the anesthesia record on the day of surgery.    Video-Visit Details    Type of service:  Video Visit    Provider received " verbal consent for a Video Visit from the patient? Yes    Video Start Time: 1029  Video End Time (time video stopped): 1048    Originating Location (pt. Location): Home    Distant Location (provider location): Off-site    Mode of Communication:  Video Conference via [employer]     On the day of service:     Prep time: 8 minutes  Visit time: 19 minutes  Documentation time: 13 minutes  ------------------------------------------  Total time: 40 minutes      DARYN Hamilton CNP  Preoperative Assessment Center  [facility]  Phone: [phone]  Fax: [phone]

## 2022-12-14 NOTE — H&P (VIEW-ONLY)
Pre-Operative H & P     CC:  Preoperative exam to assess for increased cardiopulmonary risk while undergoing surgery and anesthesia.    Date of Encounter: 12/14/2022  Primary Care Physician:  Linda Macdonald     Reason for visit:   Encounter Diagnoses   Name Primary?     Preop examination Yes     Anal fistula      Alcoholic cirrhosis of liver with ascites (H) - seeing [facility]         HPI  Abiola Matute is a 57 year old female who presents for pre-operative H & P in preparation for  Procedure Information     Date/Time: 1/6/23 @ 0930     Procedure: EXAM UNDER ANESTHESIA, ANUS, SETON EXCHANGE    Anesthesia type: MAC    Pre-op diagnosis: anal fistula    Location: [facility]    Providers: Dr. Dugan          Abiola Matute is a 57 year old female with hypertension, history of atypical ductal hyperplasia of breast, bifid uvula, thrombocytopenia, crohn's disease. Alcoholic liver cirrhosis with ascites and alcohol dependence in remission that has an anal fistula.  She follows with Dr. Schilling for crohn's disease and is managed with infliximab infusions.  She saw DARYN Preston in the colorectal clinic last on 3/19/21 for anal fistula and had her last seton replacement on 2/26/21.  She called the clinic earlier this past fall to schedule seton replacement.  Procedure planned as above.     History is obtained from the patient and chart review    Hx of abnormal bleeding or anti-platelet use: none    Menstrual history: Patient's last menstrual period was 05/31/2006.:      Past Medical History  Past Medical History:   Diagnosis Date     Alcohol abuse      Alcoholic cirrhosis of liver with ascites      Anal fistula      Atypical ductal hyperplasia of breast 09/10/2010    ERT not recommended -left - and flat epithelial atypia-scheduled for breast biopsy 9/17/2010      Bifid uvula      Cholelithiasis      Contact perianal dermatitis and other eczema     recurrent - clobetasol      Crohn  disease      Fear of flying      - gets Ativan prn.      Hypertension      IBS (irritable bowel syndrome)        Past Surgical History  Past Surgical History:   Procedure Laterality Date     BIOPSY ANAL N/A 3/28/2019    anal biopsy and culure placement of seton - Dr Fleming     BIOPSY BREAST Left 09/17/2010    - scheduled with Dr. Varma      BIOPSY LIVER  2019     COLONOSCOPY  2006     COLONOSCOPY N/A 12/23/2014    Procedure: COMBINED COLONOSCOPY, SINGLE OR MULTIPLE BIOPSY/POLYPECTOMY BY BIOPSY;  Surgeon: Diane Fleming MD;  Location:  GI     COLONOSCOPY N/A 12/5/2019    Procedure: COLONOSCOPY, WITH POLYPECTOMY AND BIOPSY;  Surgeon: Farhan Schilling MD;  Location: [facility]     ENDOSCOPIC RETROGRADE CHOLANGIOPANCREATOGRAM N/A 4/24/2020    Procedure: ENDOSCOPIC RETROGRADE CHOLANGIOPANCREATOGRAPHY WITH, sledge removal,sphincterotomy, stent in gallbladder and pancreatic duct stent, and balloon dilation;  Surgeon: Guru Isac Kraft MD;  Location: [facility] OR     ESOPHAGOSCOPY, GASTROSCOPY, DUODENOSCOPY (EGD), COMBINED N/A 12/5/2019    Procedure: ESOPHAGOGASTRODUODENOSCOPY (EGD);  Surgeon: Farhan Schilling MD;  Location: [facility]     EXAM UNDER ANESTHESIA ANUS N/A 3/28/2019    Procedure: EXAM UNDER ANESTHESIA ANUS;  Surgeon: Diane Fleming MD;  Location:  OR     EXAM UNDER ANESTHESIA ANUS N/A 2/26/2021    Procedure: EXAM UNDER ANESTHESIA OF ANUS, SETON PLACEMENT, EXCISION OF SKIN BRIDGE;  Surgeon: Avtar Nam MD;  Location: [facility]     HYSTERECTOMY, VAGINAL  2006    with Dr. Licha Zhou - with BSO for fibroids      IR GALLBLADDER DRAIN PLACEMENT  4/22/2020     OPEN REDUCTION INTERNAL FIXATION ANKLE Left at age 27    plates and screws removed at age 36     Pelviscopy with removal of bilateral hydrosalpinges.  04/15/2010     ZZC APPENDECTOMY  at age 14       Prior to Admission Medications  Current Outpatient Medications   Medication Sig Dispense Refill     furosemide (LASIX) 40 MG  tablet TAKE 1 TABLET BY MOUTH  DAILY (Patient taking differently: Take 40 mg by mouth every morning) 90 tablet 3     inFLIXimab (REMICADE) 100 MG injection Inject into the vein once Next dose 12/16/22       Milk Thistle-Dand-Fennel-Licor (MILK THISTLE XTRA) CAPS capsule Take 1 capsule by mouth 2 times daily       Multiple Vitamins-Minerals (MULTIVITAMIN & MINERAL PO) Take by mouth every morning       spironolactone (ALDACTONE) 100 MG tablet TAKE 1 TABLET BY MOUTH  DAILY (Patient taking differently: Take 100 mg by mouth every morning) 90 tablet 3     triamcinolone (KENALOG) 0.1 % external cream Apply to AA BID x 1-2 week then PRN only 80 g 3       Allergies  Allergies   Allergen Reactions     Fish Oil      Redness and itching around eye area only - went away when fish oil capsules stopped      Metronidazole      pain/itching     Naphthalenemethylamines      Lamisil = mild urticarial reaction     Ppd [Tuberculin Purified Protein Derivative]      Sulfa Drugs      hives       Social History  Social History     Socioeconomic History     Marital status:      Spouse name: Albino     Number of children: 3     Years of education: 14     Highest education level: Not on file   Occupational History     Occupation: unemployed   Tobacco Use     Smoking status: Never     Smokeless tobacco: Never   Vaping Use     Vaping Use: Never used   Substance and Sexual Activity     Alcohol use: Not Currently     Drug use: No     Comment: no herbal meds either     Sexual activity: Yes     Partners: Male     Birth control/protection: Post-menopausal     Comment: harper had vasectomy   Other Topics Concern      Service No     Blood Transfusions No     Caffeine Concern No     Comment: rarely drinks caffeine     Occupational Exposure Not Asked     Hobby Hazards Not Asked     Sleep Concern Not Asked     Stress Concern Not Asked     Weight Concern Not Asked     Special Diet Not Asked     Back Care Not Asked     Exercise Yes     Comment:  does a lot of walking - 4x/week     Bike Helmet Not Asked     Seat Belt Yes     Comment: always     Self-Exams Yes     Comment: SBE encouraged monthly     Parent/sibling w/ CABG, MI or angioplasty before 65F 55M? Yes   Social History Narrative    calcium - drinks 5-6 large glasses skim milk/day    flex sig/colonoscopy -at age 49    sun precautions - discussed    mammogram - needs every 2 years in her 30's, then yearly from then on    Td booster - 9/99 and 4/27/2010    pneumovax -at age 59    DEXA -when perimenopausal    stool hemoccults - every year after age 40    ASA- start at age 39    mulvitamin - encouraged     Social Determinants of Health     Financial Resource Strain: Not on file   Food Insecurity: Not on file   Transportation Needs: Not on file   Physical Activity: Not on file   Stress: Not on file   Social Connections: Not on file   Intimate Partner Violence: Not on file   Housing Stability: Not on file       Family History  Family History   Problem Relation Age of Onset     Breast Cancer Mother      Gastrointestinal Disease Mother      Heart Disease Father 57     Cancer Sister      Skin Cancer Sister      Hypertension Maternal Grandmother      Diabetes Maternal Grandfather      Diabetes Paternal Grandmother      Heart Disease Paternal Grandfather      Anesthesia Reaction No family hx of      Thrombosis No family hx of        Review of Systems  The complete review of systems is negative other than noted in the HPI or here.   Anesthesia Evaluation   Pt has had prior anesthetic.     No history of anesthetic complications       ROS/MED HX  ENT/Pulmonary:     (+) ERIC risk factors, hypertension,  (-) tobacco use, asthma, COPD and recent URI   Neurologic:  - neg neurologic ROS     Cardiovascular:     (+) hypertension-----Previous cardiac testing   Echo: Date: Results:    Stress Test: Date: Results:    ECG Reviewed: Date: 2019 Results:  See H&P  Cath: Date: Results:   (-) [place] and orthopnea/PND   METS/Exercise  Tolerance: >4 METS Comment: Walks on her treadmill a couple times per week (1-3mph) for 15-20 minutes   Hematologic: Comments: Thrombocytopenia    INR elevated   (-) history of blood clots and history of blood transfusion   Musculoskeletal: Comment: Chronic generalized joint pain      GI/Hepatic: Comment: Crohn's has been fairly well controlled with infliximab infusions.    + anal fistula    (+) bowel prep, liver disease,     Renal/Genitourinary:  - neg Renal ROS     Endo:       Psychiatric/Substance Use:     (+) psychiatric history anxiety (periodic situational anxiety) alcohol abuse     Infectious Disease:  - neg infectious disease ROS  (-) Recent Fever   Malignancy:  - neg malignancy ROS     Other: Comment: Eczema on heels     (+) , H/O Chronic Pain,        Virtual visit -  No vitals were obtained    Physical Exam  Constitutional: Awake, alert, cooperative, no apparent distress, and appears stated age.  Eyes: Pupils equal  HENT: Normocephalic  Respiratory: non labored breathing   Neurologic: Awake, alert, oriented to name, place and time.   Neuropsychiatric: Calm, cooperative. Normal affect.      Prior Labs/Diagnostic Studies   All labs and imaging personally reviewed     EKG  2019  NSR     Labs:   Component      Latest Ref Rng & Units 12/16/2022   WBC      4.0 - 11.0 10e3/uL 3.5 (L)   RBC Count      3.80 - 5.20 10e6/uL 3.90   Hemoglobin      11.7 - 15.7 g/dL 14.2   Hematocrit      35.0 - 47.0 % 44.2   MCV      78 - 100 fL 113 (H)   MCH      26.5 - 33.0 pg 36.4 (H)   MCHC      31.5 - 36.5 g/dL 32.1   RDW      10.0 - 15.0 % 14.7   Platelet Count      150 - 450 10e3/uL 96 (L)   % Neutrophils      % 55   % Lymphocytes      % 26   % Monocytes      % 12   % Eosinophils      % 6   % Basophils      % 1   % Immature Granulocytes      % 0   NRBCs per 100 WBC      <1 /100 0   Absolute Neutrophils      1.6 - 8.3 10e3/uL 1.9   Absolute Lymphocytes      0.8 - 5.3 10e3/uL 0.9   Absolute Monocytes      0.0 - 1.3 10e3/uL 0.4    Absolute Eosinophils      0.0 - 0.7 10e3/uL 0.2   Absolute Basophils      0.0 - 0.2 10e3/uL 0.0   Absolute Immature Granulocytes      <=0.4 10e3/uL 0.0   Absolute NRBCs      10e3/uL 0.0   Sodium      136 - 145 mmol/L 137   Potassium      3.4 - 5.3 mmol/L 3.5   Chloride      98 - 107 mmol/L 101   Carbon Dioxide (CO2)      22 - 29 mmol/L 28   Anion Gap      7 - 15 mmol/L 8   Urea Nitrogen      6.0 - 20.0 mg/dL 12.7   Creatinine      0.51 - 0.95 mg/dL 0.83   Calcium      8.6 - 10.0 mg/dL 8.4 (L)   Glucose      70 - 99 mg/dL 112 (H)   Alkaline Phosphatase      35 - 104 U/L 112 (H)   AST      10 - 35 U/L 89 (H)   ALT      10 - 35 U/L 49 (H)   Protein Total      6.4 - 8.3 g/dL 7.9   Albumin      3.5 - 5.2 g/dL 3.6   Bilirubin Total      <=1.2 mg/dL 3.1 (H)   GFR Estimate      >60 mL/min/1.73m2 81   INR      0.85 - 1.15 1.22 (H)       The patient's records and results personally reviewed by this provider.     Outside records reviewed from: Care Everywhere      Assessment      Abiola Matute is a 57 year old female seen as a PAC referral for risk assessment and optimization for anesthesia.    Plan/Recommendations  Pt will be optimized for the proposed procedure.  See below for details on the assessment, risk, and preoperative recommendations    NEUROLOGY  - No history of TIA, CVA or seizure    -Post Op delirium risk factors:  No risk identified    ENT  - No current airway concerns.  Will need to be reassessed day of surgery.  Mallampati: Unable to assess  TM: Unable to assess    CARDIAC  - Hypertension is managed with lasix and aldactone; hold both morning of surgery.      - METS (Metabolic Equivalents) = >4  Patient performs 4 or more METS exercise without symptoms            Total Score: 0      RCRI-Very low risk: Class 1 0.4% complication rate            Total Score: 0        PULMONARY    ERIC Low Risk            Total Score: 2    ERIC: Hypertension    ERIC: Over 50 ys old      - Denies asthma or inhaler use  - Tobacco  "History      History   Smoking Status     Never   Smokeless Tobacco     Never       GI  - denies GERD   - follows with Dr. Cervantes for alcoholic cirrhosis of the liver with ascites.  Hasn't required paracentesis for 4 years.  Ascites is well controlled with lasix and aldactone.  Hold both morning of surgery, but okay to take upon return home.  - CMP ordered  - follows with Dr. Schilling for crohn's disease.  Managed with infliximab infusions.  Next infusion is scheduled for 12/23/22.  - anal fistula - procedure planned as above.     PONV High Risk  Total Score: 3           1 AN PONV: Pt is Female    1 AN PONV: Patient is not a current smoker    1 AN PONV: Intended Post Op Opioids            ENDOCRINE    - BMI: Estimated body mass index is 28.96 kg/m  as calculated from the following:    Height as of 2/7/22: 1.676 m (5' 6\").    Weight as of 11/11/22: 81.4 kg (179 lb 6.4 oz).  Overweight (BMI 25.0-29.9)  - No history of Diabetes Mellitus    HEME  VTE Low Risk 0.26%            Total Score: 0      - No history of abnormal bleeding or antiplatelet use.    - thrombocytopenia secondary to liver disease.  CBC recheck ordered.    - INR chronically elevated secondary to liver disease.  Recheck ordered.        MSK  - has generalized chronic joint pain.  Consider cautious positioning.     PSYCH  - alcohol dependence in remission x approximately 2 years.      Different anesthesia methods/types have been discussed with the patient, but they are aware that the final plan will be decided by the assigned anesthesia provider on the date of service.      The patient is optimized for their procedure. AVS with information on surgery time/arrival time, meds and NPO status given by nursing staff. No further diagnostic testing indicated.    Please refer to the physical examination documented by the anesthesiologist in the anesthesia record on the day of surgery.    Video-Visit Details    Type of service:  Video Visit    Provider received " verbal consent for a Video Visit from the patient? Yes    Video Start Time: 1029  Video End Time (time video stopped): 1048    Originating Location (pt. Location): Home    Distant Location (provider location): Off-site    Mode of Communication:  Video Conference via [employer]     On the day of service:     Prep time: 8 minutes  Visit time: 19 minutes  Documentation time: 13 minutes  ------------------------------------------  Total time: 40 minutes      DARYN Hamilton CNP  Preoperative Assessment Center  [facility]  Phone: [phone]  Fax: [phone]

## 2022-12-14 NOTE — PROGRESS NOTES
[place]ia is a 57 year old who is being evaluated via a billable video visit.      How would you like to obtain your AVS? MyChart    HPI         Review of Systems         Objective    Vitals - Patient Reported  Pain Score: Severe Pain (7)        Physical Exam           SERGEY Glass LPN

## 2022-12-16 ENCOUNTER — LAB (OUTPATIENT)
Dept: LAB | Facility: CLINIC | Age: 57
End: 2022-12-16
Payer: COMMERCIAL

## 2022-12-16 DIAGNOSIS — K70.31 ALCOHOLIC CIRRHOSIS OF LIVER WITH ASCITES (H): ICD-10-CM

## 2022-12-16 DIAGNOSIS — Z01.818 PREOP EXAMINATION: ICD-10-CM

## 2022-12-16 DIAGNOSIS — K50.113 CROHN'S DISEASE OF LARGE INTESTINE WITH FISTULA (H): ICD-10-CM

## 2022-12-16 LAB
ALBUMIN SERPL BCG-MCNC: 3.6 G/DL (ref 3.5–5.2)
ALP SERPL-CCNC: 112 U/L (ref 35–104)
ALT SERPL W P-5'-P-CCNC: 49 U/L (ref 10–35)
ANION GAP SERPL CALCULATED.3IONS-SCNC: 8 MMOL/L (ref 7–15)
AST SERPL W P-5'-P-CCNC: 89 U/L (ref 10–35)
BASOPHILS # BLD AUTO: 0 10E3/UL (ref 0–0.2)
BASOPHILS NFR BLD AUTO: 1 %
BILIRUB DIRECT SERPL-MCNC: 1.39 MG/DL (ref 0–0.3)
BILIRUB SERPL-MCNC: 3.1 MG/DL
BUN SERPL-MCNC: 12.7 MG/DL (ref 6–20)
CALCIUM SERPL-MCNC: 8.4 MG/DL (ref 8.6–10)
CHLORIDE SERPL-SCNC: 101 MMOL/L (ref 98–107)
CREAT SERPL-MCNC: 0.83 MG/DL (ref 0.51–0.95)
CRP SERPL-MCNC: 7.4 MG/L
DEPRECATED HCO3 PLAS-SCNC: 28 MMOL/L (ref 22–29)
EOSINOPHIL # BLD AUTO: 0.2 10E3/UL (ref 0–0.7)
EOSINOPHIL NFR BLD AUTO: 6 %
ERYTHROCYTE [DISTWIDTH] IN BLOOD BY AUTOMATED COUNT: 14.7 % (ref 10–15)
ERYTHROCYTE [SEDIMENTATION RATE] IN BLOOD BY WESTERGREN METHOD: 44 MM/HR (ref 0–30)
GFR SERPL CREATININE-BSD FRML MDRD: 81 ML/MIN/1.73M2
GLUCOSE SERPL-MCNC: 112 MG/DL (ref 70–99)
HCT VFR BLD AUTO: 44.2 % (ref 35–47)
HGB BLD-MCNC: 14.2 G/DL (ref 11.7–15.7)
IMM GRANULOCYTES # BLD: 0 10E3/UL
IMM GRANULOCYTES NFR BLD: 0 %
INR PPP: 1.22 (ref 0.85–1.15)
LYMPHOCYTES # BLD AUTO: 0.9 10E3/UL (ref 0.8–5.3)
LYMPHOCYTES NFR BLD AUTO: 26 %
MCH RBC QN AUTO: 36.4 PG (ref 26.5–33)
MCHC RBC AUTO-ENTMCNC: 32.1 G/DL (ref 31.5–36.5)
MCV RBC AUTO: 113 FL (ref 78–100)
MONOCYTES # BLD AUTO: 0.4 10E3/UL (ref 0–1.3)
MONOCYTES NFR BLD AUTO: 12 %
NEUTROPHILS # BLD AUTO: 1.9 10E3/UL (ref 1.6–8.3)
NEUTROPHILS NFR BLD AUTO: 55 %
NRBC # BLD AUTO: 0 10E3/UL
NRBC BLD AUTO-RTO: 0 /100
PLATELET # BLD AUTO: 96 10E3/UL (ref 150–450)
POTASSIUM SERPL-SCNC: 3.5 MMOL/L (ref 3.4–5.3)
PROT SERPL-MCNC: 7.9 G/DL (ref 6.4–8.3)
RBC # BLD AUTO: 3.9 10E6/UL (ref 3.8–5.2)
SODIUM SERPL-SCNC: 137 MMOL/L (ref 136–145)
WBC # BLD AUTO: 3.5 10E3/UL (ref 4–11)

## 2022-12-16 PROCEDURE — 86140 C-REACTIVE PROTEIN: CPT

## 2022-12-16 PROCEDURE — 85025 COMPLETE CBC W/AUTO DIFF WBC: CPT

## 2022-12-16 PROCEDURE — 36415 COLL VENOUS BLD VENIPUNCTURE: CPT

## 2022-12-16 PROCEDURE — 80053 COMPREHEN METABOLIC PANEL: CPT

## 2022-12-16 PROCEDURE — 85652 RBC SED RATE AUTOMATED: CPT

## 2022-12-16 PROCEDURE — 82248 BILIRUBIN DIRECT: CPT

## 2022-12-16 PROCEDURE — 85610 PROTHROMBIN TIME: CPT

## 2022-12-22 NOTE — RESULT ENCOUNTER NOTE
Geovanna Culver,    Your test results are attached.  All of your results are okay for surgery.  Your liver function tests are all improved from the last check 2 months ago except for your bilirubin which has gone up some.    Per the note from your last visit with Dr. Cervantes, she did want you to return for a follow up visit with her in one year which is around this time since you saw her last in December 2021.  I would recommend that you schedule that follow up visit with her as soon as you are able to review the labs and just have a general check in, but it doesn't need to be done before your surgery.          Soledad Rowland DNP, RN, ANP-C

## 2022-12-31 ENCOUNTER — HOSPITAL ENCOUNTER (EMERGENCY)
Facility: CLINIC | Age: 57
Discharge: HOME | End: 2023-01-01
Attending: EMERGENCY MEDICINE | Admitting: EMERGENCY MEDICINE
Payer: COMMERCIAL

## 2022-12-31 ENCOUNTER — APPOINTMENT (OUTPATIENT)
Dept: ULTRASOUND IMAGING | Facility: CLINIC | Age: 57
End: 2022-12-31
Attending: EMERGENCY MEDICINE
Payer: COMMERCIAL

## 2022-12-31 DIAGNOSIS — L03.116 CELLULITIS OF LEG, LEFT: ICD-10-CM

## 2022-12-31 DIAGNOSIS — R00.2 PALPITATIONS: ICD-10-CM

## 2022-12-31 DIAGNOSIS — R16.0 LIVER MASS: ICD-10-CM

## 2022-12-31 DIAGNOSIS — R07.9 CHEST PAIN, UNSPECIFIED TYPE: ICD-10-CM

## 2022-12-31 LAB
ANION GAP SERPL CALCULATED.3IONS-SCNC: 10 MMOL/L (ref 7–15)
BASOPHILS # BLD AUTO: 0 10E3/UL (ref 0–0.2)
BASOPHILS NFR BLD AUTO: 1 %
BUN SERPL-MCNC: 14.2 MG/DL (ref 6–20)
CALCIUM SERPL-MCNC: 9.1 MG/DL (ref 8.6–10)
CHLORIDE SERPL-SCNC: 100 MMOL/L (ref 98–107)
CREAT SERPL-MCNC: 0.75 MG/DL (ref 0.51–0.95)
DEPRECATED HCO3 PLAS-SCNC: 24 MMOL/L (ref 22–29)
EOSINOPHIL # BLD AUTO: 0.2 10E3/UL (ref 0–0.7)
EOSINOPHIL NFR BLD AUTO: 4 %
ERYTHROCYTE [DISTWIDTH] IN BLOOD BY AUTOMATED COUNT: 14.3 % (ref 10–15)
GFR SERPL CREATININE-BSD FRML MDRD: >90 ML/MIN/1.73M2
GLUCOSE SERPL-MCNC: 106 MG/DL (ref 70–99)
HCT VFR BLD AUTO: 39.8 % (ref 35–47)
HGB BLD-MCNC: 13.6 G/DL (ref 11.7–15.7)
IMM GRANULOCYTES # BLD: 0 10E3/UL
IMM GRANULOCYTES NFR BLD: 0 %
LYMPHOCYTES # BLD AUTO: 1.1 10E3/UL (ref 0.8–5.3)
LYMPHOCYTES NFR BLD AUTO: 24 %
MCH RBC QN AUTO: 36.4 PG (ref 26.5–33)
MCHC RBC AUTO-ENTMCNC: 34.2 G/DL (ref 31.5–36.5)
MCV RBC AUTO: 106 FL (ref 78–100)
MONOCYTES # BLD AUTO: 0.6 10E3/UL (ref 0–1.3)
MONOCYTES NFR BLD AUTO: 13 %
NEUTROPHILS # BLD AUTO: 2.7 10E3/UL (ref 1.6–8.3)
NEUTROPHILS NFR BLD AUTO: 58 %
NRBC # BLD AUTO: 0 10E3/UL
NRBC BLD AUTO-RTO: 0 /100
PLATELET # BLD AUTO: 100 10E3/UL (ref 150–450)
POTASSIUM SERPL-SCNC: 3.9 MMOL/L (ref 3.4–5.3)
RBC # BLD AUTO: 3.74 10E6/UL (ref 3.8–5.2)
SODIUM SERPL-SCNC: 134 MMOL/L (ref 136–145)
TROPONIN T SERPL HS-MCNC: 22 NG/L
WBC # BLD AUTO: 4.7 10E3/UL (ref 4–11)

## 2022-12-31 PROCEDURE — 93971 EXTREMITY STUDY: CPT | Mod: LT

## 2022-12-31 PROCEDURE — 99285 EMERGENCY DEPT VISIT HI MDM: CPT | Mod: 25

## 2022-12-31 PROCEDURE — 93005 ELECTROCARDIOGRAM TRACING: CPT

## 2022-12-31 PROCEDURE — 80048 BASIC METABOLIC PNL TOTAL CA: CPT | Performed by: EMERGENCY MEDICINE

## 2022-12-31 PROCEDURE — 76882 US LMTD JT/FCL EVL NVASC XTR: CPT | Mod: LT

## 2022-12-31 PROCEDURE — 84484 ASSAY OF TROPONIN QUANT: CPT | Performed by: EMERGENCY MEDICINE

## 2022-12-31 PROCEDURE — 36415 COLL VENOUS BLD VENIPUNCTURE: CPT | Performed by: EMERGENCY MEDICINE

## 2022-12-31 PROCEDURE — 85025 COMPLETE CBC W/AUTO DIFF WBC: CPT | Performed by: EMERGENCY MEDICINE

## 2022-12-31 ASSESSMENT — ACTIVITIES OF DAILY LIVING (ADL): ADLS_ACUITY_SCORE: 35

## 2022-12-31 ASSESSMENT — ENCOUNTER SYMPTOMS
MYALGIAS: 1
COLOR CHANGE: 1
FEVER: 0

## 2023-01-01 ENCOUNTER — APPOINTMENT (OUTPATIENT)
Dept: CT IMAGING | Facility: CLINIC | Age: 58
End: 2023-01-01
Attending: EMERGENCY MEDICINE
Payer: COMMERCIAL

## 2023-01-01 VITALS
DIASTOLIC BLOOD PRESSURE: 75 MMHG | RESPIRATION RATE: 18 BRPM | TEMPERATURE: 97.3 F | OXYGEN SATURATION: 98 % | SYSTOLIC BLOOD PRESSURE: 130 MMHG | HEART RATE: 84 BPM

## 2023-01-01 LAB — TROPONIN T SERPL HS-MCNC: 20 NG/L

## 2023-01-01 PROCEDURE — 250N000013 HC RX MED GY IP 250 OP 250 PS 637: Performed by: EMERGENCY MEDICINE

## 2023-01-01 PROCEDURE — 71275 CT ANGIOGRAPHY CHEST: CPT

## 2023-01-01 PROCEDURE — 36415 COLL VENOUS BLD VENIPUNCTURE: CPT | Performed by: EMERGENCY MEDICINE

## 2023-01-01 PROCEDURE — 250N000011 HC RX IP 250 OP 636: Performed by: EMERGENCY MEDICINE

## 2023-01-01 PROCEDURE — 84484 ASSAY OF TROPONIN QUANT: CPT | Performed by: EMERGENCY MEDICINE

## 2023-01-01 RX ORDER — CEPHALEXIN 500 MG/1
500 CAPSULE ORAL 4 TIMES DAILY
Qty: 14 CAPSULE | Refills: 0 | Status: SHIPPED | OUTPATIENT
Start: 2023-01-01 | End: 2023-02-14

## 2023-01-01 RX ORDER — CEPHALEXIN 500 MG/1
500 CAPSULE ORAL ONCE
Status: COMPLETED | OUTPATIENT
Start: 2023-01-01 | End: 2023-01-01

## 2023-01-01 RX ORDER — IOPAMIDOL 755 MG/ML
500 INJECTION, SOLUTION INTRAVASCULAR ONCE
Status: COMPLETED | OUTPATIENT
Start: 2023-01-01 | End: 2023-01-01

## 2023-01-01 RX ADMIN — CEPHALEXIN 500 MG: 500 CAPSULE ORAL at 02:06

## 2023-01-01 RX ADMIN — IOPAMIDOL 62 ML: 755 INJECTION, SOLUTION INTRAVENOUS at 01:14

## 2023-01-01 ASSESSMENT — ACTIVITIES OF DAILY LIVING (ADL): ADLS_ACUITY_SCORE: 35

## 2023-01-01 NOTE — ED TRIAGE NOTES
bilateral leg cramping that started last night. Patient reports that she was rubbing her calves to relieve the cramping. Tonight she noted a hard painful lump on her left calf and swelling in her left ankle. CMS intact.

## 2023-01-01 NOTE — DISCHARGE INSTRUCTIONS
An outpatient stress test and ziopatch was ordered for you.  The cardiac testing department will call you to schedule this in the next several days.  Please follow up on this result with your primary care doctor.      Discharge Instructions  Cellulitis    Cellulitis is an infection of the skin that occurs when bacteria enter the skin.   Symptoms are generally redness, swelling, warmth and pain.  Your infection appeared to be appropriate to treat at home with antibiotics.  However, sometimes your infection may be worse than it seemed at first, or may worsen with time. If you have new or worse symptoms, you may need to be seen again in the Emergency Department or by your primary provider.    Generally, every Emergency Department visit should have a follow-up clinic visit with either a primary or a specialty clinic/provider. Please follow-up as instructed by your emergency provider today.    Return to the Emergency Department if:  The redness, pain, or swelling gets a lot worse.  If the red area was marked, return if it is red significantly beyond the marked area.  You are unable to get your antibiotics, or are vomiting (throwing up) these pills, or you cannot take them.  You are feeling more ill, weak or lightheaded.  You start to run a new fever (temperature >101 F).  Anything else about the infection worries or concerns you.  Treatment:  Start your antibiotics right away, and take them as prescribed. Be sure to finish the whole prescription, even if you are better.  Apply a heating pad, warm packs, or warm water soaks to the infected area for 15 minutes at a time, at least 3 times a day. Do not use a heating pad on your feet or legs if you have diabetes. Do not sleep with a heating pad on, since this can cause burns or skin injury.  Rest your injured area for at least 1-2 days. After that you may start using your extremity again as long as there is not too much pain.   Raise the injured area above the level of your  heart as much as possible in the first 1-2 days.  Tylenol  (acetaminophen), Motrin  (ibuprofen), or Advil  (ibuprofen) may help may help reduce pain and fever and may help you feel more comfortable. Be sure to read and follow the package directions, and ask your provider if you have questions.    If you were given a prescription for medicine here today, be sure to read all of the information (including the package insert) that comes with your prescription.  This will include important information about the medicine, its side effects, and any warnings that you need to know about.  The pharmacist who fills the prescription can provide more information and answer questions you may have about the medicine.  If you have questions or concerns that the pharmacist cannot address, please call or return to the Emergency Department.     Remember that you can always come back to the Emergency Department if you are not able to see your regular provider in the amount of time listed above, if you get any new symptoms, or if there is anything that worries you.

## 2023-01-01 NOTE — ED PROVIDER NOTES
History     Chief Complaint:  Leg Pain       The history is provided by the patient.      Abiola Matute is a 57 year old female who presents with pain in her left calf that radiates to her ankle. She states that she first noticed it last night but thought it was just a muscle strain due to her recent involvement with wedding preparation. Tonight, her calf started to get red and more painful. As she presents to the ED, she states there is now a small bump on the back of her calf. She states that her left ankle is slightly more swollen but her leg is not. She denies that she is experiencing a fever. She denies any recent injury or personal history of skin infections.    Upon recheck, she states that she experienced an episode of racing heart and chest tightness on Friday night. She states that it happened again today and it started around 1500.    ROS:  Review of Systems   Constitutional: Negative for fever.   Musculoskeletal: Positive for myalgias.   Skin: Positive for color change.   All other systems reviewed and are negative.    Allergies:  Fish Oil  Metronidazole  Naphthalenemethylamines  Ppd [Tuberculin Purified Protein Derivative]  Sulfa Drugs   Omega 3-Dha-Epa-Fish Oil   Penicillins  Terbinafine HCl  Lidocaine    Medications:    Furosemide  Infliximab  Milk Thistle-Dand-Fennel-Licor  Multiple Vitamins-Minerals  Spironolactone  Triamcinolone    Past Medical History:    Alcohol abuse   Alcoholic cirrhosis of liver with ascites   Anal fistula   Atypical ductal hyperplasia of breast   Bifid uvula   Cholelithiasis   Contact perianal dermatitis and other eczema   Crohn disease   Hypertension  IBS (irritable bowel syndrome)     Past Surgical History:    Anal biopsy  Breast biopsy  Liver biopsy  Colonoscopy x 3  Endoscopic retrograde cholangiopancreatogram  EGD  Exam anus under anesthesia x2  Hysterectomy  IR gallbladder drain placement  Open reduction fixation ankle  Pelviscopy with removal of bilateral  hydrosalpinges  Appendectomy    Family History:    Mother: breast cancer, GI disease  Father: CAD  Sister: Cancer    Social History:   reports that she has never smoked. She has never used smokeless tobacco. She reports that she does not currently use alcohol. She reports that she does not use drugs.   The patient presents to the ED alone.  The patient arrived to the ED via private transportation.  PCP: Linda Macdonald     Physical Exam     Patient Vitals for the past 24 hrs:   BP Temp Temp src Pulse Resp SpO2   01/01/23 0225 130/75 -- -- 84 18 98 %   12/31/22 2340 125/76 -- -- 86 -- 97 %   12/31/22 2212 (!) 168/127 97.3  F (36.3  C) Temporal 98 16 99 %        Physical Exam    Gen: alert  HEENT: PERRL, oropharynx clear  Neck: normal ROM  CV: RRR, no murmurs, 2+ distal pulses in all 4 extremities  Chest: no tenderness over the chest wall  Pulm: breath sounds equal, lungs clear  Abd: Soft, nontender  Back: no evidence of injury  MSK: redness, swelling and induration to posterior left calf, RLE wnl  Skin: no rash, left lower leg redness  Neuro: alert, appropriate conversation and interaction        Emergency Department Course   ECG  ECG taken at 2243, ECG read at 2250  Normal sinus rhythm  Cannot rule out Inferior infarct, age undetermined  Nonspecific ST changes   Rate 92 bpm. IA interval 172 ms. QRS duration 84 ms. QT/QTc 380/469 ms. P-R-T axes 2 3 16.     Imaging:  CT Chest Pulmonary Embolism w Contrast   Final Result   IMPRESSION:   1.  No evidence for pulmonary emboli.   2.  No evidence for acute pulmonary disease.   3.  Cirrhotic changes of the liver with interval development of a right hepatic lobe mass, concerning for neoplasm. Dedicated CT abdomen and pelvis recommended for further evaluation.      US Lower Extremity Venous Duplex Left   Final Result   IMPRESSION:   1.  No deep venous thrombosis in the left lower extremity.      POC US SOFT TISSUE    (Results Pending)   Echo Stress Echocardiogram    (Results  Pending)   Adult Leadless EKG Monitor 3 to 7 Days    (Results Pending)      Laboratory:  Labs Ordered and Resulted from Time of ED Arrival to Time of ED Departure   BASIC METABOLIC PANEL - Abnormal       Result Value    Sodium 134 (*)     Potassium 3.9      Chloride 100      Carbon Dioxide (CO2) 24      Anion Gap 10      Urea Nitrogen 14.2      Creatinine 0.75      Calcium 9.1      Glucose 106 (*)     GFR Estimate >90     TROPONIN T, HIGH SENSITIVITY - Abnormal    Troponin T, High Sensitivity 22 (*)    CBC WITH PLATELETS AND DIFFERENTIAL - Abnormal    WBC Count 4.7      RBC Count 3.74 (*)     Hemoglobin 13.6      Hematocrit 39.8       (*)     MCH 36.4 (*)     MCHC 34.2      RDW 14.3      Platelet Count 100 (*)     % Neutrophils 58      % Lymphocytes 24      % Monocytes 13      % Eosinophils 4      % Basophils 1      % Immature Granulocytes 0      NRBCs per 100 WBC 0      Absolute Neutrophils 2.7      Absolute Lymphocytes 1.1      Absolute Monocytes 0.6      Absolute Eosinophils 0.2      Absolute Basophils 0.0      Absolute Immature Granulocytes 0.0      Absolute NRBCs 0.0     TROPONIN T, HIGH SENSITIVITY - Abnormal    Troponin T, High Sensitivity 20 (*)         Procedures   PROCEDURE: ED Limited Bedside PROCEDURE: ED Limited Bedside Ultrasound   PERFORMED BY: Yolande Godwin MD  LOCATION/SITE: left calf  INDICATION:  Pain and induration  FINDINGS:  Cobblestoning without evidence of fluid collection  Interpretation: cellulitis without evidence of drainable abscess  Ultrasound images were saved to PACS  Ultrasound images were saved to PACS    Interventions:  Medications   iopamidol (ISOVUE-370) solution 500 mL (62 mLs Intravenous Given 1/1/23 0114)   sodium chloride (PF) 0.9% PF flush 100 mL (82 mLs Intravenous Given 1/1/23 0115)   cephALEXin (KEFLEX) capsule 500 mg (500 mg Oral Given 1/1/23 0206)        Impression & Plan      Medical Decision Making:  Leg pain and swelling- DVT US negative.  Exam consistent  with cellluliits.  Possible very early phlegmon.  No drainable collection on my bedside exam.  No hx of prior skin infections.  Keflex and close follow up.  Signs of worsening infection discussed and she will return for these.    Chest pain and palpitations-   ED eval for ACS completed and neg.  EKG showed NSR and no acute ischemia.  Serial troponins flat.  CT chest PE completed secondary to leg swelling and pain and neg.  Plan for outpatient stress test and zio patch.  PCP follow up.    Liver mass- incidentally noted on CT.  High risk for malignancy given past hx of cirrhosis.  [place] [facility] referral via [facility].    Diagnosis:    ICD-10-CM    1. Cellulitis of leg, left  L03.116       2. Liver mass  R16.0       3. Chest pain, unspecified type  R07.9 Echo Stress Echocardiogram     Adult Leadless EKG Monitor 3 to 7 Days      4. Palpitations  R00.2 Echo Stress Echocardiogram     Adult Leadless EKG Monitor 3 to 7 Days           Discharge Medications:  Discharge Medication List as of 1/1/2023  2:14 AM      START taking these medications    Details   !! cephALEXin (KEFLEX) 500 MG capsule Take 1 capsule (500 mg) by mouth 4 times daily, Disp-14 capsule, R-0, [facility]      !! cephALEXin (KEFLEX) 500 MG capsule Take 1 capsule (500 mg) by mouth 4 times daily, Disp-14 capsule, R-0, [facility]       !! - Potential duplicate medications found. Please discuss with provider.           Yolande Godwin MD  01/01/23 7812

## 2023-01-02 ENCOUNTER — TELEPHONE (OUTPATIENT)
Dept: FAMILY MEDICINE | Facility: CLINIC | Age: 58
End: 2023-01-02

## 2023-01-02 LAB
ATRIAL RATE - MUSE: 92 BPM
DIASTOLIC BLOOD PRESSURE - MUSE: NORMAL MMHG
INTERPRETATION ECG - MUSE: NORMAL
P AXIS - MUSE: 2 DEGREES
PR INTERVAL - MUSE: 172 MS
QRS DURATION - MUSE: 84 MS
QT - MUSE: 380 MS
QTC - MUSE: 469 MS
R AXIS - MUSE: 3 DEGREES
SYSTOLIC BLOOD PRESSURE - MUSE: NORMAL MMHG
T AXIS - MUSE: 16 DEGREES
VENTRICULAR RATE- MUSE: 92 BPM

## 2023-01-02 NOTE — TELEPHONE ENCOUNTER
Reason for Call:  Same Day Appointment, Requested Provider:   Linda Macdonald    PCP: Linda Macdonald    Reason for visit: ED follow up/cellulitis/[place]/12/31. Needs to be seen by 1/4    Duration of symptoms:     Have you been treated for this in the past?     Additional comments:     Can we leave a detailed message on this number? YES    Phone number patient can be reached at: Cell number on file:    Telephone Information:   Mobile [phone]       Best Time:     Call taken on 1/2/2023 at 12:21 PM by Denisa Bustillos

## 2023-01-03 ENCOUNTER — OFFICE VISIT (OUTPATIENT)
Dept: FAMILY MEDICINE | Facility: CLINIC | Age: 58
End: 2023-01-03
Payer: COMMERCIAL

## 2023-01-03 VITALS
DIASTOLIC BLOOD PRESSURE: 78 MMHG | SYSTOLIC BLOOD PRESSURE: 124 MMHG | HEART RATE: 90 BPM | OXYGEN SATURATION: 98 % | WEIGHT: 180 LBS | TEMPERATURE: 97 F | HEIGHT: 66 IN | BODY MASS INDEX: 28.93 KG/M2 | RESPIRATION RATE: 16 BRPM

## 2023-01-03 DIAGNOSIS — R16.0 LIVER MASS: ICD-10-CM

## 2023-01-03 DIAGNOSIS — L03.116 CELLULITIS OF LEFT LOWER EXTREMITY: Primary | ICD-10-CM

## 2023-01-03 PROCEDURE — 99214 OFFICE O/P EST MOD 30 MIN: CPT | Performed by: NURSE PRACTITIONER

## 2023-01-03 NOTE — PROGRESS NOTES
"  Assessment & Plan   Abiola was seen today for er f/u.    Diagnoses and all orders for this visit:    Cellulitis of left lower extremity  Noted improvement.    Complete Cephalexin regimen. Closely monitor for erythema, swelling, pain.      Liver mass  Incidental finding of liver mass on CT scan.  Concerning for neoplasm.    Urgent referral to oncology.   Staff message sent to hepatologist - Dr. Cervantes for further follow-up as well.      -     Adult Oncology/Hematology  Referral; Future    30 minutes spent on patient encounter, chart review and charting on today's date.         MED REC REQUIRED  Post Medication Reconciliation Status:  Discharge medications reconciled, continue medications without change    BMI:   Estimated body mass index is 29.05 kg/m  as calculated from the following:    Height as of this encounter: 1.676 m (5' 6\").    Weight as of this encounter: 81.6 kg (180 lb).     Return in about 2 weeks (around 1/17/2023) for Oncology consultation.    Linda Macdonald, DARYN [facility]          Art Coleman is a 57 year old, presenting for the following health issues:  ER F/U      History of Present Illness       Reason for visit:  Follow up er visit    She eats 2-3 servings of fruits and vegetables daily.She consumes 1 sweetened beverage(s) daily.She exercises with enough effort to increase her heart rate 10 to 19 minutes per day.  She exercises with enough effort to increase her heart rate 3 or less days per week.   She is taking medications regularly.     Oldest daughter got  on 12/31/22.  Went to ED due to left leg pain and palpitations/chest tightness.    Ultrasound negative for DVT, +Cellulitis - put on Cephalexin 4 times daily.    Is doing ok with antibiotics.      Normal EKG, flat serial troponin levels.  CT of chest was negative.  Planning stress test.      Incidental finding of liver mass on CT scan.  Concerning for neoplasm.          Review of " "Systems   Constitutional, HEENT, cardiovascular, pulmonary, gi and gu systems are negative, except as otherwise noted.      Objective    /78 (BP Location: Right arm, Patient Position: Chair, Cuff Size: Adult Large)   Pulse 90   Temp 97  F (36.1  C) (Temporal)   Resp 16   Ht 1.676 m (5' 6\")   Wt 81.6 kg (180 lb)   LMP 05/31/2006   SpO2 98%   BMI 29.05 kg/m    Body mass index is 29.05 kg/m .  Physical Exam     GENERAL: healthy, alert and no distress  RESP: lungs clear to auscultation - no rales, rhonchi or wheezes  CV: regular rate and rhythm, normal S1 S2, no S3 or S4, no murmur  MS: left posterior calf with mild erythema and induration, non-tender to palpation  PSYCH: mentation appears normal, affect normal/bright              "

## 2023-01-04 ENCOUNTER — PATIENT OUTREACH (OUTPATIENT)
Dept: ONCOLOGY | Facility: CLINIC | Age: 58
End: 2023-01-04

## 2023-01-04 DIAGNOSIS — K70.31 ALCOHOLIC CIRRHOSIS OF LIVER WITH ASCITES (H): Primary | ICD-10-CM

## 2023-01-04 NOTE — PROGRESS NOTES
New Patient Oncology Nurse Navigator Note     Referring provider: Dr Macdonald, PCP    Referring Clinic/Organization: [facility]     Referred to: Medical Oncology    Requested provider (if applicable): First available - did not specify     Referral Received: 01/03/23       Evaluation for : liver mass     Clinical History (per Nurse review of records provided):      12/31/22 ED [facility] for leg pain, work up reveals incidental liver mass, hx of DUNN & alcoholic cirrhosis established with GI/Dr Cervantes.    CT Chest                                                                 IMPRESSION:  1.  No evidence for pulmonary emboli.  2.  No evidence for acute pulmonary disease.  3.  Cirrhotic changes of the liver with interval development of a right hepatic lobe mass, concerning for neoplasm. Dedicated CT abdomen and pelvis recommended for further evaluation.      Clinical Assessment / Barriers to Care (Per Nurse):    GI/Dr Cervantes's team is arranging MRI liver 1/19/23 and GI follow up appt (last seen 12/2021).     Will offer Med Onc consult sometime after MRI for presumed hepatocellular carcinoma.     Records Location: [facility]     Records Needed:     N/A    Additional testing needed prior to consult:     MRI liver on 1/19/23    Keaton Valdovinos, RN, BSN, OCN  Oncology New Patient Nurse Navigator   [facility]  [phone]

## 2023-01-04 NOTE — TELEPHONE ENCOUNTER
"Spoke with patient via phone, she says that there is a lot going on right now, and depending on how things turn out, she may have to go to [place], on Friday 1/6/2023 - if so, then surgery scheduled that same date would have to be canceled. I provided her with my phone number, and also sent her the following Mamaya message so that she can reply or call me at [phone] if she needs to cancel/ postpone her surgery.    \"Geovanna Coleman,    I am writing to provide my contact information in-case you need to cancel/ reschedule surgery with Dr. Mary Dugan on Friday 1/6/2023.     If you do need to cancel, you may reply to this message to let me know, or you may call me directly at [phone].    Thank-you,    Philly Velasquez  Julia-op Coordinator  [facility]  Direct Phone: [phone]\"  "

## 2023-01-05 ENCOUNTER — ANESTHESIA EVENT (OUTPATIENT)
Dept: SURGERY | Facility: AMBULATORY SURGERY CENTER | Age: 58
End: 2023-01-05
Payer: COMMERCIAL

## 2023-01-06 ENCOUNTER — HOSPITAL ENCOUNTER (OUTPATIENT)
Facility: AMBULATORY SURGERY CENTER | Age: 58
Discharge: HOME | End: 2023-01-06
Attending: COLON & RECTAL SURGERY
Payer: COMMERCIAL

## 2023-01-06 ENCOUNTER — ANESTHESIA (OUTPATIENT)
Dept: SURGERY | Facility: AMBULATORY SURGERY CENTER | Age: 58
End: 2023-01-06
Payer: COMMERCIAL

## 2023-01-06 VITALS
TEMPERATURE: 97.5 F | DIASTOLIC BLOOD PRESSURE: 65 MMHG | SYSTOLIC BLOOD PRESSURE: 106 MMHG | HEART RATE: 70 BPM | BODY MASS INDEX: 28.93 KG/M2 | WEIGHT: 180 LBS | HEIGHT: 66 IN | RESPIRATION RATE: 16 BRPM | OXYGEN SATURATION: 100 %

## 2023-01-06 DIAGNOSIS — K60.30 ANAL FISTULA: ICD-10-CM

## 2023-01-06 PROCEDURE — 46275 REMOVE ANAL FIST INTER: CPT

## 2023-01-06 PROCEDURE — 46275 REMOVE ANAL FIST INTER: CPT | Performed by: COLON & RECTAL SURGERY

## 2023-01-06 RX ORDER — FENTANYL CITRATE 50 UG/ML
50 INJECTION, SOLUTION INTRAMUSCULAR; INTRAVENOUS EVERY 5 MIN PRN
Status: DISCONTINUED | OUTPATIENT
Start: 2023-01-06 | End: 2023-01-07 | Stop reason: HOSPADM

## 2023-01-06 RX ORDER — PROPOFOL 10 MG/ML
INJECTION, EMULSION INTRAVENOUS PRN
Status: DISCONTINUED | OUTPATIENT
Start: 2023-01-06 | End: 2023-01-06

## 2023-01-06 RX ORDER — LIDOCAINE 40 MG/G
CREAM TOPICAL
Status: DISCONTINUED | OUTPATIENT
Start: 2023-01-06 | End: 2023-01-07 | Stop reason: HOSPADM

## 2023-01-06 RX ORDER — ONDANSETRON 2 MG/ML
4 INJECTION INTRAMUSCULAR; INTRAVENOUS ONCE
Status: DISCONTINUED | OUTPATIENT
Start: 2023-01-06 | End: 2023-01-07 | Stop reason: HOSPADM

## 2023-01-06 RX ORDER — MEPERIDINE HYDROCHLORIDE 25 MG/ML
12.5 INJECTION INTRAMUSCULAR; INTRAVENOUS; SUBCUTANEOUS
Status: DISCONTINUED | OUTPATIENT
Start: 2023-01-06 | End: 2023-01-07 | Stop reason: HOSPADM

## 2023-01-06 RX ORDER — ACETAMINOPHEN 325 MG/1
975 TABLET ORAL ONCE
Status: DISCONTINUED | OUTPATIENT
Start: 2023-01-06 | End: 2023-01-07 | Stop reason: HOSPADM

## 2023-01-06 RX ORDER — FENTANYL CITRATE 50 UG/ML
25 INJECTION, SOLUTION INTRAMUSCULAR; INTRAVENOUS
Status: DISCONTINUED | OUTPATIENT
Start: 2023-01-06 | End: 2023-01-07 | Stop reason: HOSPADM

## 2023-01-06 RX ORDER — LIDOCAINE HYDROCHLORIDE 20 MG/ML
INJECTION, SOLUTION INFILTRATION; PERINEURAL PRN
Status: DISCONTINUED | OUTPATIENT
Start: 2023-01-06 | End: 2023-01-06

## 2023-01-06 RX ORDER — HYDROMORPHONE HYDROCHLORIDE 1 MG/ML
0.4 INJECTION, SOLUTION INTRAMUSCULAR; INTRAVENOUS; SUBCUTANEOUS EVERY 5 MIN PRN
Status: DISCONTINUED | OUTPATIENT
Start: 2023-01-06 | End: 2023-01-07 | Stop reason: HOSPADM

## 2023-01-06 RX ORDER — HYDROMORPHONE HYDROCHLORIDE 1 MG/ML
0.2 INJECTION, SOLUTION INTRAMUSCULAR; INTRAVENOUS; SUBCUTANEOUS EVERY 5 MIN PRN
Status: DISCONTINUED | OUTPATIENT
Start: 2023-01-06 | End: 2023-01-07 | Stop reason: HOSPADM

## 2023-01-06 RX ORDER — FENTANYL CITRATE 50 UG/ML
INJECTION, SOLUTION INTRAMUSCULAR; INTRAVENOUS PRN
Status: DISCONTINUED | OUTPATIENT
Start: 2023-01-06 | End: 2023-01-06

## 2023-01-06 RX ORDER — ONDANSETRON 2 MG/ML
4 INJECTION INTRAMUSCULAR; INTRAVENOUS EVERY 30 MIN PRN
Status: DISCONTINUED | OUTPATIENT
Start: 2023-01-06 | End: 2023-01-07 | Stop reason: HOSPADM

## 2023-01-06 RX ORDER — ONDANSETRON 4 MG/1
4 TABLET, ORALLY DISINTEGRATING ORAL EVERY 30 MIN PRN
Status: DISCONTINUED | OUTPATIENT
Start: 2023-01-06 | End: 2023-01-07 | Stop reason: HOSPADM

## 2023-01-06 RX ORDER — OXYCODONE HYDROCHLORIDE 5 MG/1
10 TABLET ORAL EVERY 4 HOURS PRN
Status: DISCONTINUED | OUTPATIENT
Start: 2023-01-06 | End: 2023-01-07 | Stop reason: HOSPADM

## 2023-01-06 RX ORDER — FENTANYL CITRATE 50 UG/ML
25 INJECTION, SOLUTION INTRAMUSCULAR; INTRAVENOUS EVERY 5 MIN PRN
Status: DISCONTINUED | OUTPATIENT
Start: 2023-01-06 | End: 2023-01-07 | Stop reason: HOSPADM

## 2023-01-06 RX ORDER — SODIUM CHLORIDE, SODIUM LACTATE, POTASSIUM CHLORIDE, CALCIUM CHLORIDE 600; 310; 30; 20 MG/100ML; MG/100ML; MG/100ML; MG/100ML
INJECTION, SOLUTION INTRAVENOUS CONTINUOUS
Status: DISCONTINUED | OUTPATIENT
Start: 2023-01-06 | End: 2023-01-07 | Stop reason: HOSPADM

## 2023-01-06 RX ORDER — LIDOCAINE HYDROCHLORIDE AND EPINEPHRINE 10; 10 MG/ML; UG/ML
INJECTION, SOLUTION INFILTRATION; PERINEURAL PRN
Status: DISCONTINUED | OUTPATIENT
Start: 2023-01-06 | End: 2023-01-06 | Stop reason: HOSPADM

## 2023-01-06 RX ORDER — PROPOFOL 10 MG/ML
INJECTION, EMULSION INTRAVENOUS CONTINUOUS PRN
Status: DISCONTINUED | OUTPATIENT
Start: 2023-01-06 | End: 2023-01-06

## 2023-01-06 RX ORDER — OXYCODONE HYDROCHLORIDE 5 MG/1
5 TABLET ORAL EVERY 4 HOURS PRN
Status: DISCONTINUED | OUTPATIENT
Start: 2023-01-06 | End: 2023-01-07 | Stop reason: HOSPADM

## 2023-01-06 RX ADMIN — LIDOCAINE HYDROCHLORIDE 80 MG: 20 INJECTION, SOLUTION INFILTRATION; PERINEURAL at 08:56

## 2023-01-06 RX ADMIN — PROPOFOL 20 MG: 10 INJECTION, EMULSION INTRAVENOUS at 08:55

## 2023-01-06 RX ADMIN — SODIUM CHLORIDE, SODIUM LACTATE, POTASSIUM CHLORIDE, CALCIUM CHLORIDE: 600; 310; 30; 20 INJECTION, SOLUTION INTRAVENOUS at 07:45

## 2023-01-06 RX ADMIN — PROPOFOL 150 MCG/KG/MIN: 10 INJECTION, EMULSION INTRAVENOUS at 08:56

## 2023-01-06 RX ADMIN — PROPOFOL 50 MG: 10 INJECTION, EMULSION INTRAVENOUS at 08:53

## 2023-01-06 RX ADMIN — ONDANSETRON 4 MG: 2 INJECTION INTRAMUSCULAR; INTRAVENOUS at 09:09

## 2023-01-06 RX ADMIN — FENTANYL CITRATE 50 MCG: 50 INJECTION, SOLUTION INTRAMUSCULAR; INTRAVENOUS at 08:56

## 2023-01-06 ASSESSMENT — LIFESTYLE VARIABLES: TOBACCO_USE: 0

## 2023-01-06 ASSESSMENT — COPD QUESTIONNAIRES: COPD: 0

## 2023-01-06 ASSESSMENT — ENCOUNTER SYMPTOMS: ORTHOPNEA: 0

## 2023-01-06 NOTE — ANESTHESIA POSTPROCEDURE EVALUATION
Patient: Abiola Matute    Procedure: Procedure(s):  EXAM UNDER ANESTHESIA, ANUS, SETON EXCHANGE, partial fistulotomy       Anesthesia Type:  MAC    Note:  Disposition: Outpatient   Postop Pain Control: Uneventful            Sign Out: Well controlled pain   PONV: No   Neuro/Psych: Uneventful            Sign Out: Acceptable/Baseline neuro status   Airway/Respiratory: Uneventful            Sign Out: Acceptable/Baseline resp. status   CV/Hemodynamics: Uneventful            Sign Out: Acceptable CV status; No obvious hypovolemia; No obvious fluid overload   Other NRE: NONE   DID A NON-ROUTINE EVENT OCCUR? No           Last vitals:  Vitals Value Taken Time   /65 01/06/23 0945   Temp 36.4  C (97.5  F) 01/06/23 0945   Pulse 70 01/06/23 0945   Resp 16 01/06/23 0945   SpO2 100 % 01/06/23 0945       Electronically Signed By: Segundo Haywood DO  January 6, 2023  12:17 PM

## 2023-01-06 NOTE — DISCHARGE INSTRUCTIONS
Anorectal Surgery Instructions      What can I expect after anorectal surgery?  Most anorectal procedures are done as outpatient surgery, and you go home the same day as the procedure. A few surgical procedures will require that you stay in the hospital for about one to three days. No matter where the procedure is done or how long or short it takes, these recommendations will help you heal and feel more comfortable.    Medicines:  The anal area is very sensitive; you may have some discomfort after the procedure. We would suggest taking acetaminophen (Tylenol), as needed for discomfort. However, it is important to realize that most narcotic pain relievers also have acetaminophen, and excessive doses of acetaminophen can be dangerous.Accordingly, you can substitute 1000 milligrams of acetaminophen (two Extra Strength Tylenol or similar generic). The maximum acceptable dose of acetaminophen is 4000 milligrams.  Refilling prescriptions. If you need additional pain medication, please call the triage nurse at [phone] during normal business hours (8 a.m. to 4 p.m., Monday though Friday) or have your pharmacy fax a refill request to [phone]. If you call after hours or on the weekends, the doctor on call may not know you personally and may not renew narcotic pain medication by phone. Call your primary care provider for all other medication refills.     Bowel function and Perineal care:  Bowel movements can be very painful. It is important to have regular bowel movements at least every other day and to keep your stool soft. Your doctor may tell you to take a stool softener, such as Colace, once or twice a day. Try to avoid constipation and/or diarrhea as this can make the pain and bleeding worse. A high fiber diet, including at least four servings of fruits or vegetables daily, will help to keep your bowel movements regular and soft. If you have trouble getting enough fiber in your daily diet, a fiber supplement  can be considered, including Metamucil, Benefiber, or Citrucel, and can be bought at your local grocery store or pharmacy. It is important to drink six to eight glasses of water or juice everyday when using fiber products.    You can expect to have some bleeding after bowel movements, but it should stop soon after you wipe. Use a wet cloth or perianal pad (Tucks or Preparation H pads) to gently wipe the area after each bowel movement. Do not rub the anal area or use a lot of pressure. Using a spray bottle filled with warm water helps loosen any remaining stool. Blot gently with a soft dry cloth or tissue paper.    If possible, take a tub bath immediately after each bowel movement. Baths should be take at least 3 times daily for the first week to 10 days following your procedure. You should soak in the tub for 10 to 15 minutes each time with water as warm as you can tolerate. Even after you go back to work, it is a good idea to sit in the tub in the morning, after returning from work, and again in the evening before bedtime.    Constipation will cause you to strain when you have a bowel movement. The hard stool will be difficult to pass, will increase pain and bleeding, and will slow down healing. If you have not had a bowel movement within 2 days, take 2 teaspoons of Milk of Magnesia in the morning. If there are no results, repeat this. Stop taking Milk of Magnesia or other laxatives if you begin to have diarrhea.    Diarrhea can occur if too much laxative is taken or for several other reasons. If you have 3 or more loose, watery stools in a 24-hour period, stop taking laxatives. Continue to eat a high fiber diet and to take bulk-forming agents such as Metamucil, Benefiber, or Citrucel. You may take Immodium as directed on the package but please call the triage nurse, [phone], if this was not discussed with you surgeon preoperatively.    If you are still having diarrhea or constipation after you have tried  these recommendations, please call the triage nurse line, [phone].    Bleeding/Infection:  Infection around the anal opening is not very common. The anal area has excellent blood supply, which helps the area to heal. Bloody discharge after bowel movements is normal and may last 2 to 4 weeks after your surgery. However, if you bleed between bowel movements and cannot get it to stop, call the triage nurse immediately [phone].    Activity:  After your procedure, there are no restrictions on your activity except restrictions surrounding being on narcotics and in pain, such as no heavy machine operating or driving. You may walk, climb stairs, ride in a car, and sit as tolerated. It is helpful to avoid sitting in one position for long periods (2 or more hours). After some surgeries, you may be told not to perform any lifting (more than 10 pounds) for several weeks after surgery.    When do I need to call the doctor or triage nurse?  If you experience any of the problems listed here, call our triage nurse during business hours ([phone]). The nurse will help you with your problem or have the doctor call you. After hours and on weekends, please call the main hospital number ([phone]) and ask for the colon and rectal surgery person on call. Some is available to help you 24 hours a day, seven days a week.  Fever greater than 101 degrees  Chills  Foul-smelling drainage  Nausea and vomiting  Diarrhea - greater than 3 water stools in 24 hours  Constipation - no bowel movement after 3 days  Severe bleeding that does not stop soon after a bowel movement  Problems with the incision, including increased pain, swelling, or redness        [facility]  Phone: [phone]                  [facility]  Home Care Following Anesthesia  For 24 hours after surgery:  Get plenty of rest.  A responsible adult must stay with you for at least 24 hours after you leave the  surgery center.  Do not drive or use heavy equipment.  If you have weakness or tingling, don't drive or use heavy equipment until this feeling goes away.   Do not drink alcohol.   Avoid strenuous or risky activities.  Ask for help when climbing stairs.  You may feel lightheaded.  IF so, sit for a few minutes before standing.  Have someone help you get up.   If you have nausea (feel sick to your stomach): Drink only clear liquids such as apple juice, ginger ale, broth or 7-Up.  Rest may also help.  Be sure to drink enough fluids.  Move to a regular diet as you feel able.   You may have a slight fever.  Call the doctor if your fever is over 100 F (37.7 C) (taken under the tongue) or lasts longer than 24 hours.  You may have a dry mouth, a sore throat, muscle aches or trouble sleeping. These should go away after 24 hours.  Do not make important or legal decisions.   It is recommended to avoid smoking.               Tips for taking pain medications  To get the best pain relief possible, remember these points:  Take pain medications as directed, before pain becomes severe.  Pain medication can upset your stomach: taking it with food may help.  Constipation is a common side effect of pain medication. Drink plenty of  fluids.  Eat foods high in fiber. Take a stool softener if recommended by your doctor or pharmacist.  Do not drink alcohol, drive or operate machinery while taking pain medications.  Ask about other ways to control pain, such as with heat, ice or relaxation.    Tylenol/Acetaminophen Consumption  To help encourage the safe use of acetaminophen, the makers of TYLENOL  have lowered the maximum daily dose for single-ingredient Extra Strength TYLENOL  (acetaminophen) products sold in the [place] from 8 pills per day (4,000 mg) to 6 pills per day (3,000 mg). The dosing interval has also changed from 2 pills every 4-6 hours to 2 pills every 6 hours.  If you feel your pain relief is insufficient, you may take  Tylenol/Acetaminophen in addition to your narcotic pain medication.   Be careful not to exceed 3,000 mg of Tylenol/Acetaminophen in a 24 hour period from all sources.  If you are taking extra strength Tylenol/acetaminophen (500 mg), the maximum dose is 6 tablets in 24 hours.  If you are taking regular strength acetaminophen (325 mg), the maximum dose is 9 tablets in 24 hours.    Call a doctor for any of the following:  Signs of infection (fever, growing tenderness at the surgery site, a large amount of drainage or bleeding, severe pain, foul-smelling drainage, redness, swelling).  It has been over 8 to 10 hours since surgery and you are still not able to urinate (pass water).  Headache for over 24 hours.  Numbness, tingling or weakness the day after surgery (if you had spinal anesthesia).  Signs of Covid-19 infection (temperature over 100 degrees, shortness of breath, cough, loss of taste/smell, generalized body aches, persistent headache, chills, sore throat, nausea/vomiting/diarrhea)  Your doctor is:       Dr. Mary Dugan, Colon Rectal: [phone]               Or dial [phone] and ask for the resident on call for:  Colon Rectal  For emergency care, call the:  [facility]:  [phone] (TTY for hearing impaired: [phone])

## 2023-01-06 NOTE — INTERVAL H&P NOTE
"I have reviewed the surgical (or preoperative) H&P that is linked to this encounter, and examined the patient. There are no significant changes    Clinical Conditions Present on Arrival:  Clinically Significant Risk Factors Present on Admission           # Hyponatremia: Lowest Na = 134 mmol/L in last 30 days, will monitor as appropriate   # Hypocalcemia: Lowest Ca = 8.4 mg/dL in last 30 days, will monitor and replace as appropriate      # Coagulation Defect: INR = 1.22 (Ref range: 0.85 - 1.15) and/or PTT = N/A, will monitor for bleeding  # Thrombocytopenia: Lowest platelets = 96 in last 30 days, will monitor for bleeding  # Overweight: Estimated body mass index is 29.05 kg/m  as calculated from the following:    Height as of 1/3/23: 1.676 m (5' 6\").    Weight as of 1/3/23: 81.6 kg (180 lb).       "

## 2023-01-06 NOTE — ANESTHESIA PREPROCEDURE EVALUATION
Anesthesia Pre-Procedure Evaluation    Patient: Abiola Matute   MRN: [id number] : 1965        Procedure : Procedure(s):  EXAM UNDER ANESTHESIA, ANUS, SETON EXCHANGE          Past Medical History:   Diagnosis Date     Alcohol abuse      Alcoholic cirrhosis of liver with ascites      Anal fistula      Atypical ductal hyperplasia of breast 09/10/2010    ERT not recommended -left - and flat epithelial atypia-scheduled for breast biopsy 2010      Bifid uvula      Cholelithiasis      Contact perianal dermatitis and other eczema     recurrent - clobetasol      Crohn disease      Fear of flying      - gets Ativan prn.      Hypertension      IBS (irritable bowel syndrome)       Past Surgical History:   Procedure Laterality Date     BIOPSY ANAL N/A 3/28/2019    anal biopsy and culure placement of seton - Dr Fleming     BIOPSY BREAST Left 2010    - scheduled with Dr. Varma      BIOPSY LIVER  2019     COLONOSCOPY  2006     COLONOSCOPY N/A 2014    Procedure: COMBINED COLONOSCOPY, SINGLE OR MULTIPLE BIOPSY/POLYPECTOMY BY BIOPSY;  Surgeon: Diane Fleming MD;  Location:  GI     COLONOSCOPY N/A 2019    Procedure: COLONOSCOPY, WITH POLYPECTOMY AND BIOPSY;  Surgeon: Farhan Schilling MD;  Location: [facility]     ENDOSCOPIC RETROGRADE CHOLANGIOPANCREATOGRAM N/A 2020    Procedure: ENDOSCOPIC RETROGRADE CHOLANGIOPANCREATOGRAPHY WITH, sledge removal,sphincterotomy, stent in gallbladder and pancreatic duct stent, and balloon dilation;  Surgeon: Guru Isac Kraft MD;  Location:  OR     ESOPHAGOSCOPY, GASTROSCOPY, DUODENOSCOPY (EGD), COMBINED N/A 2019    Procedure: ESOPHAGOGASTRODUODENOSCOPY (EGD);  Surgeon: Farhan Schilling MD;  [facility]     EXAM UNDER ANESTHESIA ANUS N/A 3/28/2019    Procedure: EXAM UNDER ANESTHESIA ANUS;  Surgeon: Diane Fleming MD;  Location:  OR     EXAM UNDER ANESTHESIA ANUS N/A 2021    Procedure: EXAM UNDER ANESTHESIA  OF ANUS, SETON PLACEMENT, EXCISION OF SKIN BRIDGE;  Surgeon: Avtar Nam MD;  Location: [facility] OR     HYSTERECTOMY, VAGINAL  2006    with Dr. Licha Zhou - with BSO for fibroids      IR GALLBLADDER DRAIN PLACEMENT  4/22/2020     OPEN REDUCTION INTERNAL FIXATION ANKLE Left at age 27    plates and screws removed at age 36     Pelviscopy with removal of bilateral hydrosalpinges.  04/15/2010     ZZC APPENDECTOMY  at age 14      Allergies   Allergen Reactions     Fish Oil      Redness and itching around eye area only - went away when fish oil capsules stopped      Metronidazole      pain/itching     Naphthalenemethylamines      Lamisil = mild urticarial reaction     Ppd [Tuberculin Purified Protein Derivative]      Sulfa Drugs      hives      Social History     Tobacco Use     Smoking status: Never     Smokeless tobacco: Never   Substance Use Topics     Alcohol use: Not Currently      Wt Readings from Last 1 Encounters:   01/03/23 81.6 kg (180 lb)        Anesthesia Evaluation   Pt has had prior anesthetic.     No history of anesthetic complications       ROS/MED HX  ENT/Pulmonary:     (+) ERIC risk factors, hypertension,  (-) tobacco use, asthma, COPD and recent URI   Neurologic:  - neg neurologic ROS     Cardiovascular:     (+) hypertension-----Previous cardiac testing   Echo: Date: Results:    Stress Test: Date: Results:    ECG Reviewed: Date: 2019 Results:  See H&P  Cath: Date: Results:   (-) [place] and orthopnea/PND   METS/Exercise Tolerance: >4 METS Comment: Walks on her treadmill a couple times per week (1-3mph) for 15-20 minutes   Hematologic: Comments: Thrombocytopenia    INR elevated   (-) history of blood clots and history of blood transfusion   Musculoskeletal: Comment: Chronic generalized joint pain      GI/Hepatic: Comment: Crohn's has been fairly well controlled with infliximab infusions.    + anal fistula    (+) Inflammatory bowel disease, bowel prep, cholecystitis/cholelithiasis, liver disease,      Renal/Genitourinary:  - neg Renal ROS     Endo:       Psychiatric/Substance Use:     (+) psychiatric history anxiety (periodic situational anxiety) alcohol abuse     Infectious Disease:  - neg infectious disease ROS  (-) Recent Fever   Malignancy:  - neg malignancy ROS     Other: Comment: Eczema on heels     (+) , H/O Chronic Pain,        Physical Exam    Airway  airway exam normal      Mallampati: I   TM distance: > 3 FB   Neck ROM: full   Mouth opening: > 3 cm    Respiratory Devices and Support         Dental  no notable dental history         Cardiovascular   cardiovascular exam normal       Rhythm and rate: regular and normal     Pulmonary   pulmonary exam normal        breath sounds clear to auscultation           OUTSIDE LABS:  CBC:   Lab Results   Component Value Date    WBC 4.7 12/31/2022    WBC 3.5 (L) 12/16/2022    HGB 13.6 12/31/2022    HGB 14.2 12/16/2022    HCT 39.8 12/31/2022    HCT 44.2 12/16/2022     (L) 12/31/2022    PLT 96 (L) 12/16/2022     BMP:   Lab Results   Component Value Date     (L) 12/31/2022     12/16/2022    POTASSIUM 3.9 12/31/2022    POTASSIUM 3.5 12/16/2022    CHLORIDE 100 12/31/2022    CHLORIDE 101 12/16/2022    CO2 24 12/31/2022    CO2 28 12/16/2022    BUN 14.2 12/31/2022    BUN 12.7 12/16/2022    CR 0.75 12/31/2022    CR 0.83 12/16/2022     (H) 12/31/2022     (H) 12/16/2022     COAGS:   Lab Results   Component Value Date    PTT 41 (H) 04/21/2020    INR 1.22 (H) 12/16/2022     POC:   Lab Results   Component Value Date     (H) 04/24/2020    HCG Negative 06/07/2006     HEPATIC:   Lab Results   Component Value Date    ALBUMIN 3.6 12/16/2022    PROTTOTAL 7.9 12/16/2022    ALT 49 (H) 12/16/2022    AST 89 (H) 12/16/2022     (H) 11/02/2017    ALKPHOS 112 (H) 12/16/2022    BILITOTAL 3.1 (H) 12/16/2022     OTHER:   Lab Results   Component Value Date    A1C 4.6 09/27/2017    ARIEL 9.1 12/31/2022    MAG 2.0 05/21/2014    LIPASE 205 04/25/2020     AMYLASE 79 04/24/2020    TSH 1.56 12/07/2018    CRP 7.40 (H) 12/16/2022    SED 44 (H) 12/16/2022       Anesthesia Plan    ASA Status:  2   NPO Status:  NPO Appropriate    Anesthesia Type: MAC.     - Reason for MAC: Deep or markedly invasive procedure (G8)   Induction: Propofol.   Maintenance: N/A.        Consents    Anesthesia Plan(s) and associated risks, benefits, and realistic alternatives discussed. Questions answered and patient/representative(s) expressed understanding.    - Discussed:     - Discussed with:  Patient    Use of blood products discussed: No .     Postoperative Care    Pain management: Multi-modal analgesia, Oral pain medications.   PONV prophylaxis: Ondansetron (or other 5HT-3), Dexamethasone or Solumedrol     Comments:                Segundo Haywood DO

## 2023-01-06 NOTE — OP NOTE
SURGEON: Mary Dugan MD     ASSISTANT: Sonia Jacome MD Surgery Resident    PREOPERATIVE DIAGNOSIS: Perianal Crohn's disease. Chronic anorectal fistula.     POSTOPERATIVE DIAGNOSIS: Perianal Crohn's disease. Chronic anorectal fistula.      PROCEDURE: Examination under anesthesia, partial fistulotomy, exchange of seton.    INDICATIONS: Abiola Matute is a 57 year old female Crohn's disease with anal fistula who is now status post examination under anesthesia with replacement of seton and excision of anal skin tag on 2/26/21 with Dr. Nam. The seton is chronic and requires exchange. She is currently on Infliximab 10 mg/kg every 4 weeks and is followed here by gastroenterology. The risks and benefits of surgery were thoroughly discussed with the patient and she agreed to proceed.     DESCRIPTION OF PROCEDURE: The patient was brought to the operating room, placed prone on the operating room table. Deep sedation was induced with intravenous medicines with deep sedation and monitored anesthesia care. We prepped and draped the area in the usual sterile fashion. We began the procedure with a timeout and performed an anal block using a mixture 50/50 of bupivacaine and lidocaine with epinephrine. We performed anoscopy which demonstrated multiple skin tags and scarring of the left sided perianal area with significant skin tags, mild anorectal stenosis which demonstrated some posterior scarring along the anorectal canal with a posterior intersphincteric fistula with a seton. The distal rectal mucosa was normal. A partial fistulotomy was performed radially with only division of a small amount of skin with no muscle. The seton was exchanged with a red vessel loop and secured with 4 separate silk ties. At the end the case, all instruments and sponge counts were correct x2. The patient was emerged from anesthesia and taken to postoperative anesthesia care unit in good condition.     SPECIMEN: None.      ESTIMATED BLOOD LOSS: Minimal.     DRAINS: None.     URINE OUTPUT: Not measured.     DANAY LINDO MD   Colon and Rectal Surgery Staff  [facility]

## 2023-01-06 NOTE — ANESTHESIA CARE TRANSFER NOTE
Patient: Abiola Matute    Procedure: Procedure(s):  EXAM UNDER ANESTHESIA, ANUS, SETON EXCHANGE, partial fistulotomy       Diagnosis: Anal fistula [K60.3]  Diagnosis Additional Information: No value filed.    Anesthesia Type:   MAC     Note:    Oropharynx: oropharynx clear of all foreign objects and spontaneously breathing  Level of Consciousness: awake  Oxygen Supplementation: room air      Dentition: dentition unchanged  Vital Signs Stable: post-procedure vital signs reviewed and stable  Report to RN Given: handoff report given  Patient transferred to: Phase II    Handoff Report: Identifed the Patient, Identified the Reponsible Provider, Reviewed the pertinent medical history, Discussed the surgical course, Reviewed Intra-OP anesthesia mangement and issues during anesthesia, Set expectations for post-procedure period and Allowed opportunity for questions and acknowledgement of understanding      Vitals:  Vitals Value Taken Time   BP 92/58 01/06/23 0916   Temp 36.4  C (97.6  F) 01/06/23 0916   Pulse 82 01/06/23 0916   Resp 16 01/06/23 0916   SpO2 97 % 01/06/23 0916       Electronically Signed By: DARYN Bates CRNA  January 6, 2023  9:19 AM

## 2023-01-10 NOTE — PROGRESS NOTES
RECORDS STATUS - ALL OTHER DIAGNOSIS    Liver mass, possible HCC  RECORDS RECEIVED FROM: [facility]   DATE RECEIVED: 1/23/2023   NOTES STATUS DETAILS   OFFICE NOTE from referring provider Complete Epic   Linda Macdonlad APRN CNP   DISCHARGE SUMMARY from hospital     DISCHARGE REPORT from the ER     OPERATIVE REPORT Complete Epic- 3/1/2021  FLEXIBLE SIGMOIDOSCOPY      MEDICATION LIST Complete [facility]   CLINICAL TRIAL TREATMENTS TO DATE     LABS     PATHOLOGY REPORTS  3/1/2021  A. Sigmoid Colon, Polyp, Biopsy:   Colonic mucosa with no significant histologic abnormality; no significant   cryptitis or other evidence of   active colitis; no neoplastic or hyperplastic polyp identified     B. Rectum, Biopsy:   Rectal mucosa with no granulomas, cryptitis or other histologic evidence   of active Crohn; negative for   dysplasia    ANYTHING RELATED TO DIAGNOSIS Complete Labs last updated on 1/1/2023   GENONOMIC TESTING     TYPE:     IMAGING (NEED IMAGES & REPORT)     CT SCANS Complete CT Chest 1/1/2023   MRI Scheduled MRI Abdomen (Scheduled)     MRI Pelvis 10/23/2020    MAMMO     Xray Abdomen  Complete 5/20/2020    ULTRASOUND Complete    Complete- [facility] US Abdomen Limited 4/21/2020     US Biopsy Liver ([facility]) 4/24/2019    PET

## 2023-01-12 ENCOUNTER — HOSPITAL ENCOUNTER (OUTPATIENT)
Dept: CARDIOLOGY | Facility: CLINIC | Age: 58
Discharge: HOME | End: 2023-01-12
Attending: EMERGENCY MEDICINE
Payer: COMMERCIAL

## 2023-01-12 DIAGNOSIS — R00.2 PALPITATIONS: ICD-10-CM

## 2023-01-12 DIAGNOSIS — R07.9 CHEST PAIN, UNSPECIFIED TYPE: ICD-10-CM

## 2023-01-12 PROCEDURE — 93017 CV STRESS TEST TRACING ONLY: CPT

## 2023-01-12 PROCEDURE — 93242 EXT ECG>48HR<7D RECORDING: CPT

## 2023-01-12 PROCEDURE — 93018 CV STRESS TEST I&R ONLY: CPT | Performed by: INTERNAL MEDICINE

## 2023-01-12 PROCEDURE — 93350 STRESS TTE ONLY: CPT | Mod: 26 | Performed by: INTERNAL MEDICINE

## 2023-01-12 PROCEDURE — 93244 EXT ECG>48HR<7D REV&INTERPJ: CPT | Performed by: INTERNAL MEDICINE

## 2023-01-12 PROCEDURE — 93325 DOPPLER ECHO COLOR FLOW MAPG: CPT | Mod: 26 | Performed by: INTERNAL MEDICINE

## 2023-01-12 PROCEDURE — 93016 CV STRESS TEST SUPVJ ONLY: CPT | Performed by: INTERNAL MEDICINE

## 2023-01-12 PROCEDURE — 93321 DOPPLER ECHO F-UP/LMTD STD: CPT | Mod: 26 | Performed by: INTERNAL MEDICINE

## 2023-01-12 PROCEDURE — 255N000002 HC RX 255 OP 636: Performed by: EMERGENCY MEDICINE

## 2023-01-12 RX ADMIN — HUMAN ALBUMIN MICROSPHERES AND PERFLUTREN 3 ML: 10; .22 INJECTION, SOLUTION INTRAVENOUS at 13:42

## 2023-01-13 ENCOUNTER — MYC MEDICAL ADVICE (OUTPATIENT)
Dept: SURGERY | Facility: CLINIC | Age: 58
End: 2023-01-13

## 2023-01-19 ENCOUNTER — HOSPITAL ENCOUNTER (OUTPATIENT)
Dept: MRI IMAGING | Facility: CLINIC | Age: 58
Discharge: HOME | End: 2023-01-19
Attending: INTERNAL MEDICINE | Admitting: INTERNAL MEDICINE
Payer: COMMERCIAL

## 2023-01-19 DIAGNOSIS — K70.31 ALCOHOLIC CIRRHOSIS OF LIVER WITH ASCITES (H): ICD-10-CM

## 2023-01-19 DIAGNOSIS — C22.0 HCC (HEPATOCELLULAR CARCINOMA) (H): ICD-10-CM

## 2023-01-19 DIAGNOSIS — K70.31 ALCOHOLIC CIRRHOSIS OF LIVER WITH ASCITES (H): Primary | ICD-10-CM

## 2023-01-19 PROCEDURE — 255N000002 HC RX 255 OP 636: Performed by: INTERNAL MEDICINE

## 2023-01-19 PROCEDURE — A9585 GADOBUTROL INJECTION: HCPCS | Performed by: INTERNAL MEDICINE

## 2023-01-19 PROCEDURE — 74183 MRI ABD W/O CNTR FLWD CNTR: CPT

## 2023-01-19 RX ORDER — GADOBUTROL 604.72 MG/ML
8 INJECTION INTRAVENOUS ONCE
Status: COMPLETED | OUTPATIENT
Start: 2023-01-19 | End: 2023-01-19

## 2023-01-19 RX ADMIN — GADOBUTROL 8 ML: 604.72 INJECTION INTRAVENOUS at 10:57

## 2023-01-20 ENCOUNTER — LAB (OUTPATIENT)
Dept: LAB | Facility: CLINIC | Age: 58
End: 2023-01-20
Payer: COMMERCIAL

## 2023-01-20 ENCOUNTER — DOCUMENTATION ONLY (OUTPATIENT)
Dept: TRANSPLANT | Facility: CLINIC | Age: 58
End: 2023-01-20

## 2023-01-20 DIAGNOSIS — K70.31 ALCOHOLIC CIRRHOSIS OF LIVER WITH ASCITES (H): ICD-10-CM

## 2023-01-20 LAB
AFP SERPL-MCNC: 262 NG/ML
ALBUMIN SERPL BCG-MCNC: 3.8 G/DL (ref 3.5–5.2)
ALP SERPL-CCNC: 116 U/L (ref 35–104)
ALT SERPL W P-5'-P-CCNC: 58 U/L (ref 10–35)
ANION GAP SERPL CALCULATED.3IONS-SCNC: 11 MMOL/L (ref 7–15)
AST SERPL W P-5'-P-CCNC: 89 U/L (ref 10–35)
BILIRUB DIRECT SERPL-MCNC: 0.63 MG/DL (ref 0–0.3)
BILIRUB SERPL-MCNC: 1.5 MG/DL
BUN SERPL-MCNC: 13.6 MG/DL (ref 6–20)
CALCIUM SERPL-MCNC: 9.4 MG/DL (ref 8.6–10)
CHLORIDE SERPL-SCNC: 99 MMOL/L (ref 98–107)
CREAT SERPL-MCNC: 0.71 MG/DL (ref 0.51–0.95)
DEPRECATED HCO3 PLAS-SCNC: 25 MMOL/L (ref 22–29)
ERYTHROCYTE [DISTWIDTH] IN BLOOD BY AUTOMATED COUNT: 13.7 % (ref 10–15)
GFR SERPL CREATININE-BSD FRML MDRD: >90 ML/MIN/1.73M2
GLUCOSE SERPL-MCNC: 93 MG/DL (ref 70–99)
HCT VFR BLD AUTO: 45.1 % (ref 35–47)
HGB BLD-MCNC: 15 G/DL (ref 11.7–15.7)
INR PPP: 1.08 (ref 0.85–1.15)
MCH RBC QN AUTO: 35.8 PG (ref 26.5–33)
MCHC RBC AUTO-ENTMCNC: 33.3 G/DL (ref 31.5–36.5)
MCV RBC AUTO: 108 FL (ref 78–100)
PLATELET # BLD AUTO: 153 10E3/UL (ref 150–450)
POTASSIUM SERPL-SCNC: 4 MMOL/L (ref 3.4–5.3)
PROT SERPL-MCNC: 8.4 G/DL (ref 6.4–8.3)
RBC # BLD AUTO: 4.19 10E6/UL (ref 3.8–5.2)
SODIUM SERPL-SCNC: 135 MMOL/L (ref 136–145)
WBC # BLD AUTO: 4.9 10E3/UL (ref 4–11)

## 2023-01-20 PROCEDURE — 82105 ALPHA-FETOPROTEIN SERUM: CPT

## 2023-01-20 PROCEDURE — 85610 PROTHROMBIN TIME: CPT

## 2023-01-20 PROCEDURE — 80053 COMPREHEN METABOLIC PANEL: CPT

## 2023-01-20 PROCEDURE — 85027 COMPLETE CBC AUTOMATED: CPT

## 2023-01-20 PROCEDURE — 36415 COLL VENOUS BLD VENIPUNCTURE: CPT

## 2023-01-20 PROCEDURE — 82248 BILIRUBIN DIRECT: CPT

## 2023-01-20 PROCEDURE — 80321 ALCOHOLS BIOMARKERS 1OR 2: CPT

## 2023-01-22 LAB
PLPETH BLD-MCNC: 22 NG/ML
POPETH BLD-MCNC: 59 NG/ML

## 2023-01-23 ENCOUNTER — TELEPHONE (OUTPATIENT)
Dept: RADIOLOGY | Facility: CLINIC | Age: 58
End: 2023-01-23
Payer: MEDICARE

## 2023-01-23 ENCOUNTER — PRE VISIT (OUTPATIENT)
Dept: ONCOLOGY | Facility: CLINIC | Age: 58
End: 2023-01-23

## 2023-01-23 ENCOUNTER — PATIENT OUTREACH (OUTPATIENT)
Dept: ONCOLOGY | Facility: CLINIC | Age: 58
End: 2023-01-23
Payer: MEDICARE

## 2023-01-23 ENCOUNTER — REFERRAL (OUTPATIENT)
Dept: TRANSPLANT | Facility: CLINIC | Age: 58
End: 2023-01-23
Payer: MEDICARE

## 2023-01-23 DIAGNOSIS — K70.31 ALCOHOLIC CIRRHOSIS OF LIVER WITH ASCITES (H): ICD-10-CM

## 2023-01-23 DIAGNOSIS — C22.0 HEPATOCELLULAR CARCINOMA (H): Primary | ICD-10-CM

## 2023-01-23 DIAGNOSIS — K76.6 PORTAL HYPERTENSION (H): ICD-10-CM

## 2023-01-23 NOTE — TELEPHONE ENCOUNTER
Called and spoke to [place] regarding her referral to IR. Clinic spot offered to patient.     Nunu CONROY RN, BSN   Interventional Radiology [facility]   [phone]

## 2023-01-23 NOTE — LETTER
January 27, 2023    Abiola Matute  [address] [address]      Dear Abiola,    Thank you for your interest in the Transplant Center at [facility]. We look forward to being a part of your care team and assisting you through the transplant process.    As we discussed, your transplant coordinator is Fabio Antunez (Liver).  You may call your coordinator at any time with questions or concerns.  Your first scheduled call will be on January 30, 2023 between 10:00 am and 1:00 pm (request sent to coordinator best time at 2:00 pm or after). If this needs to change, call [phone].    Please complete the following.    1. Fill out and return the enclosed forms    Authorization for Electronic Communication    Authorization to Discuss Protected Health Information    Authorization for Release of Protected Health Information    2. Sign up for:    Neri, access to your electronic medical record (see enclosed pamphlet)    [email or web address], a transplant education website    You can use these tools to learn more about your transplant, communicate with your care team, and track your medical details      Sincerely,      Solid Organ Transplant  [facility]    cc: Care Team

## 2023-01-23 NOTE — LETTER
January 30, 2023        Abiola Matute  [address] [address]          Dear Abiola,    Thank you for your interest in the Transplant Center at [facility]. We look forward to being a part of your care team and assisting you through the transplant process.    As we discussed, your transplant coordinator is Fabio Antunez (Liver).  You may call your coordinator at any time with questions or concerns.  Your first scheduled call will be on January 30, 2023.  If this needs to change, call [phone].    Please complete the following.    1. Fill out and return the enclosed forms    Authorization for Electronic Communication    Authorization to Discuss Protected Health Information    Authorization for Release of Protected Health Information    2. Sign up for:    [employer], access to your electronic medical record (see enclosed pamphlet)    [email or web address], a transplant education website    You can use these tools to learn more about your transplant, communicate with your care team, and track your medical details      Sincerely,      Solid Organ Transplant  [facility]    cc: Care Team

## 2023-01-23 NOTE — PROGRESS NOTES
Pt was initially referred by [facility] PCP 1/3/23 to see Onc for liver mass, suspicious for HCC. She was scheduled to see Dr Palacio on 1/23/23 for presumed hepatocellular carcinoma, but a few days prior to visit, pt canceled the appt.     Another referral has been sent by GI team 1/20 to see IR and Med Onc. MRI Abd 1/19 confirms LIRADS class 5B lesion for HCC.     I spoke to pt today, she confirms the plan above. Will proceed to schedule next available Med Onc per pt preference.

## 2023-01-24 NOTE — PROGRESS NOTES
RECORDS STATUS - ALL OTHER DIAGNOSIS    HCC,   RECORDS RECEIVED FROM: [facility]   DATE RECEIVED: 1/26/2023   NOTES STATUS DETAILS   OFFICE NOTE from referring provider Complete Epic   Jeannette Cervantes MD   DISCHARGE SUMMARY from hospital     DISCHARGE REPORT from the ER     OPERATIVE REPORT Complete 3/1/2021  FLEXIBLE SIGMOIDOSCOPY     MEDICATION LIST Complete [facility]   CLINICAL TRIAL TREATMENTS TO DATE     LABS     PATHOLOGY REPORTS Complete See Internal biopsies in EPIC   ANYTHING RELATED TO DIAGNOSIS Complete Labs last updated on 1/20/2023    GENONOMIC TESTING     TYPE:     IMAGING (NEED IMAGES & REPORT)     CT SCANS Complete CT Chest Pulmonary 1/1/2023   MRI Complete MRI Liver 1/19/2023    More in PACS   MAMMO     ULTRASOUND Complete US Lower Extremity 12/31/2022   PET

## 2023-01-25 NOTE — PROGRESS NOTES
Interventional Radiology Clinic Visit    Date of this visit: 1/27/2023    Abiola Matute is referred by Dr. Jeannette Cervantes for treatment recommendations. The patient requires evaluation for diagnosis of hepatocellular carcinoma (HCC).    Primary Physician: Linda Macdonald        History Of Present Illness:    Aboila Matute is a 57 year old female who presents with diagnosis of unresectable hepatocellular carcinoma (HCC) on a background of cirrhosis and Crohn's disease.     Patient is followed by Dr. Farhan Schilling from gastroenterology for her Crohn's disease and Dr. Cervantes from hepatology for her cirrhosis.  According to her gastroenterology notes, the Crohn's disease is in remission and predominantly perianal disease which is inactive.    In 12/2022, she was seen in the ED for chest pain, palpitations, and treated for cellulitis. CT chest at that time was negative for PE, but did show a new right hepatic lobe mass measuring ~3 cm. Subsequent MRI showed this was HCC. She is followed by Dr. Gupta from oncology. We discussed her at our liver tumor conference and liver-directed therapy was recommended.  She is not a surgical candidate due to the presence of portal hypertension.    Patient reports she has continued to experience some intermittent mild chest symptoms since her most recent ED visit. She notes she these are symptoms are similar to those experienced with her prior anxiety, and notes she is continuing to work up these symptoms with her PCP, with stress test being planned. Previous EKG, serial trop levels, and CT chest was negative for any pertinent cardiac findings.     She states she has felt fine overal and states she has no other issues with anything else. Patient denies experiencing any recent fevers, nausea, vomiting, hematochezia, hemataemesis, abdominal pain, new or recent swelling, or history of confusion/encephalopathy.    There are no additional medical concerns at this time.        Review of Systems:    Negative unless otherwise stated in HPI above.      Past Medical/Surgical History:    Past Medical History:   Diagnosis Date     Alcohol abuse      Alcoholic cirrhosis of liver with ascites      Anal fistula      Atypical ductal hyperplasia of breast 09/10/2010    ERT not recommended -left - and flat epithelial atypia-scheduled for breast biopsy 9/17/2010      Bifid uvula      Cholelithiasis      Contact perianal dermatitis and other eczema     recurrent - clobetasol      Crohn disease      Fear of flying      - gets Ativan prn.      Hypertension      IBS (irritable bowel syndrome)      Past Surgical History:   Procedure Laterality Date     BIOPSY ANAL N/A 3/28/2019    anal biopsy and culure placement of seton - Dr Fleming     BIOPSY BREAST Left 09/17/2010    - scheduled with Dr. Varma      BIOPSY LIVER  2019     COLONOSCOPY  2006     COLONOSCOPY N/A 12/23/2014    Procedure: COMBINED COLONOSCOPY, SINGLE OR MULTIPLE BIOPSY/POLYPECTOMY BY BIOPSY;  Surgeon: Diane Fleming MD;  Location:  GI     COLONOSCOPY N/A 12/5/2019    Procedure: COLONOSCOPY, WITH POLYPECTOMY AND BIOPSY;  Surgeon: Farhan Schilling MD;  Location:  GI     ENDOSCOPIC RETROGRADE CHOLANGIOPANCREATOGRAM N/A 4/24/2020    Procedure: ENDOSCOPIC RETROGRADE CHOLANGIOPANCREATOGRAPHY WITH, sledge removal,sphincterotomy, stent in gallbladder and pancreatic duct stent, and balloon dilation;  Surgeon: Guru Isac Kraft MD;  Location:  OR     ESOPHAGOSCOPY, GASTROSCOPY, DUODENOSCOPY (EGD), COMBINED N/A 12/5/2019    Procedure: ESOPHAGOGASTRODUODENOSCOPY (EGD);  Surgeon: Farhan Schilling MD;  Location: [facility]     EXAM UNDER ANESTHESIA ANUS N/A 3/28/2019    Procedure: EXAM UNDER ANESTHESIA ANUS;  Surgeon: Diane Fleming MD;  Location:  OR     EXAM UNDER ANESTHESIA ANUS N/A 2/26/2021    Procedure: EXAM UNDER ANESTHESIA OF ANUS, SETON PLACEMENT, EXCISION OF SKIN BRIDGE;  Surgeon: Cyril  MD Avtar;  Location: [facility] OR     EXAM UNDER ANESTHESIA ANUS N/A 1/6/2023    Procedure: EXAM UNDER ANESTHESIA, ANUS, SETON EXCHANGE, partial fistulotomy;  Surgeon: Mary Dugan MD;  Location: [facility] OR     HYSTERECTOMY, VAGINAL  2006    with Dr. Licha Zhou - with BSO for fibroids      IR GALLBLADDER DRAIN PLACEMENT  4/22/2020     OPEN REDUCTION INTERNAL FIXATION ANKLE Left at age 27    plates and screws removed at age 36     Pelviscopy with removal of bilateral hydrosalpinges.  04/15/2010     ZZC APPENDECTOMY  at age 14       Current Medications:    Current Outpatient Medications   Medication Sig Dispense Refill     cephALEXin (KEFLEX) 500 MG capsule Take 1 capsule (500 mg) by mouth 4 times daily 14 capsule 0     cephALEXin (KEFLEX) 500 MG capsule Take 1 capsule (500 mg) by mouth 4 times daily 14 capsule 0     furosemide (LASIX) 40 MG tablet TAKE 1 TABLET BY MOUTH  DAILY (Patient taking differently: Take 40 mg by mouth every morning) 90 tablet 3     inFLIXimab (REMICADE) 100 MG injection Inject into the vein once Next dose 12/16/22       Milk Thistle-Dand-Fennel-Licor (MILK THISTLE XTRA) CAPS capsule Take 1 capsule by mouth 2 times daily       Multiple Vitamins-Minerals (MULTIVITAMIN & MINERAL PO) Take by mouth every morning       spironolactone (ALDACTONE) 100 MG tablet TAKE 1 TABLET BY MOUTH  DAILY (Patient taking differently: Take 100 mg by mouth every morning) 90 tablet 3     triamcinolone (KENALOG) 0.1 % external cream Apply to AA BID x 1-2 week then PRN only 80 g 3       Allergies:    Fish oil, Metronidazole, Naphthalenemethylamines, Ppd [tuberculin purified protein derivative], and Sulfa drugs    Family History:    Family History   Problem Relation Age of Onset     Breast Cancer Mother      Gastrointestinal Disease Mother      Heart Disease Father 57     Cancer Sister      Skin Cancer Sister      Hypertension Maternal Grandmother      Diabetes Maternal Grandfather      Diabetes Paternal  "Grandmother      Heart Disease Paternal Grandfather      Anesthesia Reaction No family hx of      Thrombosis No family hx of        Social History:    Patient currently lives at home with her  Albino and her youngest son. She is not currently working and remains fully independent and drives.    Social History     Socioeconomic History     Marital status:      Spouse name: Albino     Number of children: 3     Years of education: 14   Occupational History     Occupation: unemployed   Tobacco Use     Smoking status: Never     Smokeless tobacco: Never   Vaping Use     Vaping Use: Never used   Substance and Sexual Activity     Alcohol use: Not Currently     Drug use: No     Comment: no herbal meds either     Sexual activity: Yes     Partners: Male     Birth control/protection: Post-menopausal     Comment: harper had vasectomy   Other Topics Concern      Service No     Blood Transfusions No     Caffeine Concern No     Comment: rarely drinks caffeine     Exercise Yes     Comment: does a lot of walking - 4x/week     Seat Belt Yes     Comment: always     Self-Exams Yes     Comment: SBE encouraged monthly     Parent/sibling w/ CABG, MI or angioplasty before 65F 55M? Yes   Social History Narrative    calcium - drinks 5-6 large glasses skim milk/day    flex sig/colonoscopy -at age 49    sun precautions - discussed    mammogram - needs every 2 years in her 30's, then yearly from then on    Td booster - 9/99 and 4/27/2010    pneumovax -at age 59    DEXA -when perimenopausal    stool hemoccults - every year after age 40    ASA- start at age 39    mulvitamin - encouraged       Physical Exam:    LMP 05/31/2006      /82   Pulse 84   Temp 99.4  F (37.4  C) (Oral)   Resp 16   Ht 1.685 m (5' 6.34\")   Wt 76.4 kg (168 lb 8 oz)   LMP 05/31/2006   SpO2 97%   BMI 26.92 kg/m       CONSTITUTIONAL: healthy, alert and no distress.  PSYCHIATRIC: mentation appears normal and affect normal.  NEURO: Normal movements and " speech.  EYES: No jaundice or pallor.  SKIN: No jaundice.   RESP: No audible cough or wheeze.    Laboratory Studies:    Lab Results   Component Value Date    HGB 15.0 01/20/2023    HGB 13.7 06/22/2021     Lab Results   Component Value Date     01/20/2023    PLT 97 06/22/2021     Lab Results   Component Value Date    WBC 4.9 01/20/2023    WBC 4.0 06/22/2021       Lab Results   Component Value Date    INR 1.08 01/20/2023    INR 1.28 10/27/2020       Lab Results   Component Value Date    PROTTOTAL 8.4 01/20/2023    PROTTOTAL 8.1 06/22/2021      Lab Results   Component Value Date    ALBUMIN 3.8 01/20/2023    ALBUMIN 3.3 10/03/2022    ALBUMIN 3.6 06/22/2021     Lab Results   Component Value Date    BILITOTAL 1.5 01/20/2023    BILITOTAL 1.8 06/22/2021     Lab Results   Component Value Date    BILICONJ 0.0 05/20/2014      Lab Results   Component Value Date    ALKPHOS 116 01/20/2023    ALKPHOS 104 06/22/2021     Lab Results   Component Value Date    AST 89 01/20/2023    AST 70 06/22/2021     Lab Results   Component Value Date    ALT 58 01/20/2023    ALT 52 06/22/2021       Lab Results   Component Value Date    CR 0.71 01/20/2023    CR 0.71 02/23/2021     .0 on 1/20/23.    Imaging:     I personally reviewed and interpreted imaging as follows:    CT chest 1/1/2023 - 3.3 cm hypodense mass in segment 7 of the liver.    MRI abdomen 1/19/2023 - 3.3 cm arterially enhancing mass with washout and pseudocapsule in subdiaphragmatic portion of segment 7 of the liver.  Background liver is cirrhotic.  Mild splenomegaly with large perisplenic varices.      ASSESSMENT:      Abiola Matute is a 57 year old female who presents with diagnosis of unresectable hepatocellular carcinoma (HCC) on a background of cirrhosis and Crohn's disease.  She has a solitary mass in segment 7 with high AFP (262.0) indicating tumor aggression.      Child-Kim score: A5  ECOG performance status: 0    The patient is a suitable candidate for  radioembolization.    I showed [place] the imaging and discussed the findings. I discussed with her that the goal of the procedure is disease control with a survival benefit rather than a cure given the nature of the disease, but that sometimes lesions will be cured in this manner. Alternatives were reviewed, including surgery, but the patient is not a surgical candidate.  I explained the radioembolization procedure  - that it is a two step procedure on two different days that involves an angiogram and nuclear medicine study on each day, and that it requires insurance pre-approval. I explained that the first procedure involves mapping the arteries to the liver and embolizing any sidebranches that place the patient at risk of non-target radiation injury, then checking for shunting to the lungs. The second procedure involves delivering the radiation beads intra-arterially and imaging the liver afterwards to assess the pattern of radiation distribution.   I explained that both procedures are performed under conscious sedation. We discussed what will happen before, during and after the procedure; what to expect in the post procedure recovery period; and what the follow-up will be. We discussed post-radioembolization side effects which consists of varying degrees of pain, nausea, and lethargy. We discussed the risks of the procedure which include, but are not limited to, vascular injury causing bleeding or occlusion of blood vessels, groin hematoma, infection, liver failure, non-target radiation injury to structures such as gallbladder/bowel/pancreas/lung.  We also discussed that the treatment sometimes need more than one treatment session to gain control of the tumor, particularly for large or multifocal tumors, that may involve other modalities including ablation.  I also explained that new tumors may develop elsewhere given the nature of the disease. Most patients go home the same day, but occasionally circumstances are  such that a longer admission may be required. All of the patient's questions were answered and they agreed to proceed.     PLAN:  Pending insurance pre-approval, we will schedule the patient for the procedure accordingly.      It was a pleasure seeing the patient.     Signed,    Francisco Pineda  Medical Student, MS3  [employer] [facility] [employer] [facility] [employer]    Co-signed,  Yarely Diaz M.D.    Interventional Radiology  Department of Radiology  [facility]    CC  Patient Care Team:  Linda Macdonald APRN CNP as PCP - General (Family Medicine)  Diane Mckay, RN as Specialty Care Coordinator (Gastroenterology)  Sonia Pacheco [facility] as Pharmacist (Pharmacist Ambulatory Care)  Linda Macdonald APRN CNP as Assigned PCP  Enrique Cervantes MD as MD (Gastroenterology)  Farhan Schilling MD as MD (Gastroenterology)  Farhan Schilling MD as Referring Physician (Gastroenterology)  Juan Crawford MD as MD (Dermatology)  Farhan Schilling MD as Assigned Gastroenterology Provider  Ezio Cazares MD as MD (Dermatology)  Ezio Cazares MD as MD (Dermatology)  Susanna Palencia PA-C as Assigned Surgical Provider  ENRIQUE CERVANTES      I, Dr Yarely Diaz, was present with the fellow during the entire clinic visit, including the history and exam. I discussed the case with the fellow and agree with the findings as documented in the assessment and plan.        50 minutes spent on the date of the encounter doing chart review, history and exam, imaging review, documentation and further activities per the note.

## 2023-01-26 ENCOUNTER — PRE VISIT (OUTPATIENT)
Dept: ONCOLOGY | Facility: CLINIC | Age: 58
End: 2023-01-26
Payer: MEDICARE

## 2023-01-26 ENCOUNTER — ONCOLOGY VISIT (OUTPATIENT)
Dept: ONCOLOGY | Facility: CLINIC | Age: 58
End: 2023-01-26
Attending: INTERNAL MEDICINE
Payer: COMMERCIAL

## 2023-01-26 ENCOUNTER — DOCUMENTATION ONLY (OUTPATIENT)
Dept: TRANSPLANT | Facility: CLINIC | Age: 58
End: 2023-01-26

## 2023-01-26 VITALS
OXYGEN SATURATION: 96 % | TEMPERATURE: 97.7 F | DIASTOLIC BLOOD PRESSURE: 80 MMHG | WEIGHT: 178.3 LBS | HEART RATE: 69 BPM | BODY MASS INDEX: 29.71 KG/M2 | HEIGHT: 65 IN | RESPIRATION RATE: 16 BRPM | SYSTOLIC BLOOD PRESSURE: 112 MMHG

## 2023-01-26 VITALS — WEIGHT: 178 LBS | HEIGHT: 65 IN | BODY MASS INDEX: 29.66 KG/M2

## 2023-01-26 DIAGNOSIS — C22.0 HCC (HEPATOCELLULAR CARCINOMA) (H): ICD-10-CM

## 2023-01-26 DIAGNOSIS — K70.31 ALCOHOLIC CIRRHOSIS OF LIVER WITH ASCITES (H): ICD-10-CM

## 2023-01-26 PROCEDURE — 99204 OFFICE O/P NEW MOD 45 MIN: CPT | Performed by: INTERNAL MEDICINE

## 2023-01-26 PROCEDURE — G0463 HOSPITAL OUTPT CLINIC VISIT: HCPCS | Performed by: INTERNAL MEDICINE

## 2023-01-26 ASSESSMENT — PAIN SCALES - GENERAL: PAINLEVEL: NO PAIN (0)

## 2023-01-26 NOTE — LETTER
1/26/2023         RE: Abiola Matute  [address] [facility] [id number]        Dear Colleague,    Thank you for referring your patient, Abiola Matute, to the [facility]. Please see a copy of my visit note below.    [facility]    Hematology/Oncology New Patient Note      Today's Date: 1/26/23    Reason for Consultation: HCC  Referring Provider: DARYN Ponce CNP      HISTORY OF PRESENT ILLNESS: Abiola Matute is a 57 year old female who is referred for recent diagnosis of hepatocellular carcinoma. Past medical history is significant for cirrhosis and Crohn's disease. She is followed by Dr. Cervantes and Dr. Schilling, respectively.     Patient states she was diagnosed with Crohn's disease in approximately 2015. She has had at least 20 years of symptoms. She states she was discovered to have fatty liver changes before her diagnosis. She previously worked in the food and drink industry and would consume alcoholic beverages 3-4x/weekly. She has not consumed alcohol in over 6 months. Her last MRI abdomen was 6/2022 without evidence of mass, but did show cirrhotic liver morphology with portal HTN, splenomegaly, collaterals, and no significant evidence of enteric mural thickening. She remains on infliximab every 4 weeks. Last flex sig in 3/2021.     Patient was last seen in GI clinic in 10/2022. In 12/2022, she was seen in the ED for chest pain, palpitations, and treated for cellulitis. A CT chest was done, which was negative for PE, but did show a new right hepatic lobe mass measuring ~3 cm. B/L LE dopplers were negative for DVT.     She then underwent MRI of the abdomen with the lesion returning at ~3.5 cm with arterial enhancement and washout, LR-5 in segment 7.     Her case has been reviewed at [facility] and she is a borderline candidate for surgical resection. She is to meet with Dr. Diaz 1/27/23.     She lives at home with her  and  youngest son, who is 25, and now starting to have complaints of diarrhea and loose stools. She has another son and daughter who are alive and well.    Patient is a never smoker.     She underwent BSO+LEONEL in 2006 due to ovarian cysts and elected to have full removal.     Family history is significant for breast cancer in her mother.       REVIEW OF SYSTEMS:   A 14 point ROS was reviewed with pertinent positives and negatives in the HPI.        HOME MEDICATIONS:  Current Outpatient Medications   Medication Sig Dispense Refill     cephALEXin (KEFLEX) 500 MG capsule Take 1 capsule (500 mg) by mouth 4 times daily 14 capsule 0     cephALEXin (KEFLEX) 500 MG capsule Take 1 capsule (500 mg) by mouth 4 times daily 14 capsule 0     furosemide (LASIX) 40 MG tablet TAKE 1 TABLET BY MOUTH  DAILY (Patient taking differently: Take 40 mg by mouth every morning) 90 tablet 3     inFLIXimab (REMICADE) 100 MG injection Inject into the vein once Next dose 12/16/22       Milk Thistle-Dand-Fennel-Licor (MILK THISTLE XTRA) CAPS capsule Take 1 capsule by mouth 2 times daily       Multiple Vitamins-Minerals (MULTIVITAMIN & MINERAL PO) Take by mouth every morning       spironolactone (ALDACTONE) 100 MG tablet TAKE 1 TABLET BY MOUTH  DAILY (Patient taking differently: Take 100 mg by mouth every morning) 90 tablet 3     triamcinolone (KENALOG) 0.1 % external cream Apply to AA BID x 1-2 week then PRN only 80 g 3         ALLERGIES:  Allergies   Allergen Reactions     Fish Oil      Redness and itching around eye area only - went away when fish oil capsules stopped      Metronidazole      pain/itching     Naphthalenemethylamines      Lamisil = mild urticarial reaction     Ppd [Tuberculin Purified Protein Derivative]      Sulfa Drugs      hives         PAST MEDICAL HISTORY:  Past Medical History:   Diagnosis Date     Alcohol abuse      Alcoholic cirrhosis of liver with ascites      Anal fistula      Atypical ductal hyperplasia of breast 09/10/2010     ERT not recommended -left - and flat epithelial atypia-scheduled for breast biopsy 9/17/2010      Bifid uvula      Cholelithiasis      Contact perianal dermatitis and other eczema     recurrent - clobetasol      Crohn disease      Fear of flying      - gets Ativan prn.      Hypertension      IBS (irritable bowel syndrome)          PAST SURGICAL HISTORY:  Past Surgical History:   Procedure Laterality Date     BIOPSY ANAL N/A 3/28/2019    anal biopsy and culure placement of seton - Dr Fleming     BIOPSY BREAST Left 09/17/2010    - scheduled with Dr. Varma      BIOPSY LIVER  2019     COLONOSCOPY  2006     COLONOSCOPY N/A 12/23/2014    Procedure: COMBINED COLONOSCOPY, SINGLE OR MULTIPLE BIOPSY/POLYPECTOMY BY BIOPSY;  Surgeon: Diane Fleming MD;  Location:  GI     COLONOSCOPY N/A 12/5/2019    Procedure: COLONOSCOPY, WITH POLYPECTOMY AND BIOPSY;  Surgeon: Farhan Schilling MD;  Location: [facility]     ENDOSCOPIC RETROGRADE CHOLANGIOPANCREATOGRAM N/A 4/24/2020    Procedure: ENDOSCOPIC RETROGRADE CHOLANGIOPANCREATOGRAPHY WITH, sledge removal,sphincterotomy, stent in gallbladder and pancreatic duct stent, and balloon dilation;  Surgeon: Guru Isac Kraft MD;  Location:  OR     ESOPHAGOSCOPY, GASTROSCOPY, DUODENOSCOPY (EGD), COMBINED N/A 12/5/2019    Procedure: ESOPHAGOGASTRODUODENOSCOPY (EGD);  Surgeon: Farhan Schilling MD;  Location: [facility] GI     EXAM UNDER ANESTHESIA ANUS N/A 3/28/2019    Procedure: EXAM UNDER ANESTHESIA ANUS;  Surgeon: Diane Fleming MD;  Location:  OR     EXAM UNDER ANESTHESIA ANUS N/A 2/26/2021    Procedure: EXAM UNDER ANESTHESIA OF ANUS, SETON PLACEMENT, EXCISION OF SKIN BRIDGE;  Surgeon: Avtar Nam MD;  Location: [facility] OR     EXAM UNDER ANESTHESIA ANUS N/A 1/6/2023    Procedure: EXAM UNDER ANESTHESIA, ANUS, SETON EXCHANGE, partial fistulotomy;  Surgeon: Mary Dugan MD;  Location: [facility] OR     HYSTERECTOMY, VAGINAL  2006    with Dr. Hurt  Cho - with BSO for fibroids      IR GALLBLADDER DRAIN PLACEMENT  4/22/2020     OPEN REDUCTION INTERNAL FIXATION ANKLE Left at age 27    plates and screws removed at age 36     Pelviscopy with removal of bilateral hydrosalpinges.  04/15/2010     ZZC APPENDECTOMY  at age 14         SOCIAL HISTORY:  Social History     Socioeconomic History     Marital status:      Spouse name: Albino     Number of children: 3     Years of education: 14     Highest education level: Not on file   Occupational History     Occupation: unemployed   Tobacco Use     Smoking status: Never     Smokeless tobacco: Never   Vaping Use     Vaping Use: Never used   Substance and Sexual Activity     Alcohol use: Not Currently     Drug use: No     Comment: no herbal meds either     Sexual activity: Yes     Partners: Male     Birth control/protection: Post-menopausal     Comment: husb had vasectomy   Other Topics Concern      Service No     Blood Transfusions No     Caffeine Concern No     Comment: rarely drinks caffeine     Occupational Exposure Not Asked     Hobby Hazards Not Asked     Sleep Concern Not Asked     Stress Concern Not Asked     Weight Concern Not Asked     Special Diet Not Asked     Back Care Not Asked     Exercise Yes     Comment: does a lot of walking - 4x/week     Bike Helmet Not Asked     Seat Belt Yes     Comment: always     Self-Exams Yes     Comment: SBE encouraged monthly     Parent/sibling w/ CABG, MI or angioplasty before 65F 55M? Yes   Social History Narrative    calcium - drinks 5-6 large glasses skim milk/day    flex sig/colonoscopy -at age 49    sun precautions - discussed    mammogram - needs every 2 years in her 30's, then yearly from then on    Td booster - 9/99 and 4/27/2010    pneumovax -at age 59    DEXA -when perimenopausal    stool hemoccults - every year after age 40    ASA- start at age 40    mulvitamin - encouraged     Social Determinants of Health     Financial Resource Strain: Not on file   Food  "Insecurity: Not on file   Transportation Needs: Not on file   Physical Activity: Not on file   Stress: Not on file   Social Connections: Not on file   Intimate Partner Violence: Not on file   Housing Stability: Not on file         FAMILY HISTORY:  Family History   Problem Relation Age of Onset     Breast Cancer Mother      Gastrointestinal Disease Mother      Heart Disease Father 57     Cancer Sister      Skin Cancer Sister      Hypertension Maternal Grandmother      Diabetes Maternal Grandfather      Diabetes Paternal Grandmother      Heart Disease Paternal Grandfather      Anesthesia Reaction No family hx of      Thrombosis No family hx of          PHYSICAL EXAM:  Vital signs:  /80   Pulse 69   Temp 97.7  F (36.5  C) (Tympanic)   Resp 16   Ht 1.646 m (5' 4.8\")   Wt 80.9 kg (178 lb 4.8 oz)   LMP 2006   SpO2 96%   BMI 29.85 kg/m     ECO  GENERAL/CONSTITUTIONAL: No acute distress.  EYES: Pupils are equal, round, and react to light and accommodation. Extraocular movements intact.  No scleral icterus.  ENT/MOUTH: Neck supple. Oropharynx clear, no mucositis.  LYMPH: No anterior cervical, posterior cervical, supraclavicular, axillary or inguinal adenopathy.   RESPIRATORY: Clear to auscultation bilaterally. No crackles or wheezing.   CARDIOVASCULAR: Regular rate and rhythm without murmurs, gallops, or rubs.  GASTROINTESTINAL: No hepatosplenomegaly, masses, or tenderness. The patient has normal bowel sounds. No guarding.  No distention.  MUSCULOSKELETAL: Warm and well-perfused, no cyanosis, clubbing, or edema.  NEUROLOGIC: Cranial nerves II-XII are intact. Alert, oriented, answers questions appropriately.  INTEGUMENTARY: No rashes or jaundice.  GAIT: Steady, does not use assistive device.      LABS:   Latest Reference Range & Units 23 09:06   Sodium 136 - 145 mmol/L 135 (L)   Potassium 3.4 - 5.3 mmol/L 4.0   Chloride 98 - 107 mmol/L 99   Carbon Dioxide (CO2) 22 - 29 mmol/L 25   Urea Nitrogen " 6.0 - 20.0 mg/dL 13.6   Creatinine 0.51 - 0.95 mg/dL 0.71   GFR Estimate >60 mL/min/1.73m2 >90   Calcium 8.6 - 10.0 mg/dL 9.4   Anion Gap 7 - 15 mmol/L 11   Albumin 3.5 - 5.2 g/dL 3.8   Protein Total 6.4 - 8.3 g/dL 8.4 (H)   Alkaline Phosphatase 35 - 104 U/L 116 (H)   ALT 10 - 35 U/L 58 (H)   AST 10 - 35 U/L 89 (H)   AFP tumor marker <=8.3 ng/mL 262.0 (H)   Bilirubin Direct 0.00 - 0.30 mg/dL 0.63 (H)   Bilirubin Total <=1.2 mg/dL 1.5 (H)   Glucose 70 - 99 mg/dL 93   WBC 4.0 - 11.0 10e3/uL 4.9   Hemoglobin 11.7 - 15.7 g/dL 15.0   Hematocrit 35.0 - 47.0 % 45.1   Platelet Count 150 - 450 10e3/uL 153   RBC Count 3.80 - 5.20 10e6/uL 4.19   MCV 78 - 100 fL 108 (H)   MCH 26.5 - 33.0 pg 35.8 (H)   MCHC 31.5 - 36.5 g/dL 33.3   RDW 10.0 - 15.0 % 13.7   INR 0.85 - 1.15  1.08        [facility] Reference Range & Units 01/20/23 09:06   AFP tumor marker <=8.3 ng/mL 262.0 (H)       PATHOLOGY:  3/1/21:  SPECIMEN(S):   A: Sigmoid colon polyp   B: Rectal biopsy     FINAL DIAGNOSIS:   A. Sigmoid Colon, Polyp, Biopsy:   Colonic mucosa with no significant histologic abnormality; no significant   cryptitis or other evidence of   active colitis; no neoplastic or hyperplastic polyp identified     B. Rectum, Biopsy:   Rectal mucosa with no granulomas, cryptitis or other histologic evidence   of active Crohn; negative for   dysplasia         IMAGING:  CT Chest 1/1/23:  FINDINGS:  ANGIOGRAM CHEST: Pulmonary arteries are normal caliber and negative for pulmonary emboli. Thoracic aorta is negative for dissection. Ascending thoracic aorta upper range of normal in size measuring 3.9 x 3.9 cm. No CT evidence of right heart strain.     LUNGS AND PLEURA: Subsegmental atelectasis or scarring right lower lobe. The lungs otherwise are clear. No acute infiltrates.     MEDIASTINUM/AXILLAE: No enlarged mediastinal or hilar lymph nodes.     CORONARY ARTERY CALCIFICATION: None.     UPPER ABDOMEN: Cirrhotic changes of the liver. 3.3 x 2.7 cm low-attenuation mass  right hepatic lobe, not present previously. Trace ascites along the liver margin. Numerous collateral vessels left upper quadrant.     MUSCULOSKELETAL: Normal.                                                                      IMPRESSION:  1.  No evidence for pulmonary emboli.  2.  No evidence for acute pulmonary disease.  3.  Cirrhotic changes of the liver with interval development of a right hepatic lobe mass, concerning for neoplasm. Dedicated CT abdomen and pelvis recommended for further evaluation.    MRI Liver 1/19/23:  IMPRESSION:    1. Cirrhosis and evidence of portal hypertension, including  splenomegaly and large portal systemic collaterals.  2. There is approximately 3.2 x 2.9 cm arterial enhancing lesion in  hepatic segment 7 along the right hemidiaphragm, with evidence of  washout and pseudocapsule, consistent with OPTN/LIRADS class 5B  lesion.  3. No other suspicious focal hepatic lesions.  4. Based on this exam only, the patient is within Joshua criteria.  5. Cholelithiasis without MRI evidence of acute cholecystitis.  Possible gallbladder sludge versus small stone in the distal aspect of  the common bile duct.        ASSESSMENT/PLAN:  Abiola Matute is a 57 year old female who is referred for recent diagnosis of hepatocellular carcinoma. Past medical history is significant for cirrhosis and Crohn's disease. She is followed by Dr. Cervantes and Dr. Schilling, respectively.     1) Localized, early stage ([id number]) hepatocellular carcinoma, CP-A  -MRI showing a 3.2 x 2.9 cm mass in segment 7 with arterial washout with LR-5B.  -.  -She is considered to be a borderline candidate for surgical resection.   -She is to meet with Dr. Diaz 1/27/23 to discuss localregional treatment.   -I reviewed the patient's medical history and clinical findings. She has evidence of early disease and localregional treatment is standard of care. Her functional status remains excellent and CP-A. She does not have  evidence of advanced disease and does not meet indication for systemic treatment at this time.   -Following local treatment with Dr. Diaz, I explained she will be monitored periodically with basic labs, tumor markers, and imaging to assess for evidence of progression. In the meantime, she will continue to be followed by the surgical/transplant teams to review her candidacy for surgical resection vs transplant.   -In the event she is in need of systemic treatment in the future, we discussed HCC is historically unresponsive to chemotherapy. She is unlikely to be a candidate for immunotherapy given her underlying inflammatory bowel disease. In that event, discussion would be with oral multikinase inhibitors.     2) Cirrhosis, elevated LFTs, with portal hypertension and splenomegaly  -Followed by Dr. Cervantes.  -AST/ALT and T. Bili are at baseline.    3) Crohn's disease  -My understanding is her disease is in remission and she continues on infliximab every 4 weeks.  -Continue follow up with Dr. Schilling.     4) Follow up in 1 month.       Philly Gupta DO  Hematology/Oncology  [facility]        Again, thank you for allowing me to participate in the care of your patient.        Sincerely,        Philly Gupta DO

## 2023-01-26 NOTE — PROGRESS NOTES
[facility]    Hematology/Oncology New Patient Note      Today's Date: 1/26/23    Reason for Consultation: HCC  Referring Provider: DARYN Ponce CNP      HISTORY OF PRESENT ILLNESS: Abiola Matute is a 57 year old female who is referred for recent diagnosis of hepatocellular carcinoma. Past medical history is significant for cirrhosis and Crohn's disease. She is followed by Dr. Cervantes and Dr. Schilling, respectively.     Patient states she was diagnosed with Crohn's disease in approximately 2015. She has had at least 20 years of symptoms. She states she was discovered to have fatty liver changes before her diagnosis. She previously worked in the food and drink industry and would consume alcoholic beverages 3-4x/weekly. She has not consumed alcohol in over 6 months. Her last MRI abdomen was 6/2022 without evidence of mass, but did show cirrhotic liver morphology with portal HTN, splenomegaly, collaterals, and no significant evidence of enteric mural thickening. She remains on infliximab every 4 weeks. Last flex sig in 3/2021.     Patient was last seen in GI clinic in 10/2022. In 12/2022, she was seen in the ED for chest pain, palpitations, and treated for cellulitis. A CT chest was done, which was negative for PE, but did show a new right hepatic lobe mass measuring ~3 cm. B/L LE dopplers were negative for DVT.     She then underwent MRI of the abdomen with the lesion returning at ~3.5 cm with arterial enhancement and washout, LR-5 in segment 7.     Her case has been reviewed at [facility] and she is a borderline candidate for surgical resection. She is to meet with Dr. Diaz 1/27/23.     She lives at home with her  and youngest son, who is 25, and now starting to have complaints of diarrhea and loose stools. She has another son and daughter who are alive and well.    Patient is a never smoker.     She underwent BSO+LEONEL in 2006 due to ovarian cysts and elected to have full removal.      Family history is significant for breast cancer in her mother.       REVIEW OF SYSTEMS:   A 14 point ROS was reviewed with pertinent positives and negatives in the HPI.        HOME MEDICATIONS:  Current Outpatient Medications   Medication Sig Dispense Refill     cephALEXin (KEFLEX) 500 MG capsule Take 1 capsule (500 mg) by mouth 4 times daily 14 capsule 0     cephALEXin (KEFLEX) 500 MG capsule Take 1 capsule (500 mg) by mouth 4 times daily 14 capsule 0     furosemide (LASIX) 40 MG tablet TAKE 1 TABLET BY MOUTH  DAILY (Patient taking differently: Take 40 mg by mouth every morning) 90 tablet 3     inFLIXimab (REMICADE) 100 MG injection Inject into the vein once Next dose 12/16/22       Milk Thistle-Dand-Fennel-Licor (MILK THISTLE XTRA) CAPS capsule Take 1 capsule by mouth 2 times daily       Multiple Vitamins-Minerals (MULTIVITAMIN & MINERAL PO) Take by mouth every morning       spironolactone (ALDACTONE) 100 MG tablet TAKE 1 TABLET BY MOUTH  DAILY (Patient taking differently: Take 100 mg by mouth every morning) 90 tablet 3     triamcinolone (KENALOG) 0.1 % external cream Apply to AA BID x 1-2 week then PRN only 80 g 3         ALLERGIES:  Allergies   Allergen Reactions     Fish Oil      Redness and itching around eye area only - went away when fish oil capsules stopped      Metronidazole      pain/itching     Naphthalenemethylamines      Lamisil = mild urticarial reaction     Ppd [Tuberculin Purified Protein Derivative]      Sulfa Drugs      hives         PAST MEDICAL HISTORY:  Past Medical History:   Diagnosis Date     Alcohol abuse      Alcoholic cirrhosis of liver with ascites      Anal fistula      Atypical ductal hyperplasia of breast 09/10/2010    ERT not recommended -left - and flat epithelial atypia-scheduled for breast biopsy 9/17/2010      Bifid uvula      Cholelithiasis      Contact perianal dermatitis and other eczema     recurrent - clobetasol      Crohn disease      Fear of flying      - gets Ativan  prn.      Hypertension      IBS (irritable bowel syndrome)          PAST SURGICAL HISTORY:  Past Surgical History:   Procedure Laterality Date     BIOPSY ANAL N/A 3/28/2019    anal biopsy and culure placement of seton - Dr Fleming     BIOPSY BREAST Left 09/17/2010    - scheduled with Dr. Varma      BIOPSY LIVER  2019     COLONOSCOPY  2006     COLONOSCOPY N/A 12/23/2014    Procedure: COMBINED COLONOSCOPY, SINGLE OR MULTIPLE BIOPSY/POLYPECTOMY BY BIOPSY;  Surgeon: Diane Fleming MD;  Location:  GI     COLONOSCOPY N/A 12/5/2019    Procedure: COLONOSCOPY, WITH POLYPECTOMY AND BIOPSY;  Surgeon: Farhan Schilling MD;  Location: [facility] GI     ENDOSCOPIC RETROGRADE CHOLANGIOPANCREATOGRAM N/A 4/24/2020    Procedure: ENDOSCOPIC RETROGRADE CHOLANGIOPANCREATOGRAPHY WITH, sledge removal,sphincterotomy, stent in gallbladder and pancreatic duct stent, and balloon dilation;  Surgeon: Guru Isac Kraft MD;  Location: [facility]     ESOPHAGOSCOPY, GASTROSCOPY, DUODENOSCOPY (EGD), COMBINED N/A 12/5/2019    Procedure: ESOPHAGOGASTRODUODENOSCOPY (EGD);  Surgeon: Farhan Schilling MD;  Location: [facility] GI     EXAM UNDER ANESTHESIA ANUS N/A 3/28/2019    Procedure: EXAM UNDER ANESTHESIA ANUS;  Surgeon: Diane Fleming MD;  Location:  OR     EXAM UNDER ANESTHESIA ANUS N/A 2/26/2021    Procedure: EXAM UNDER ANESTHESIA OF ANUS, SETON PLACEMENT, EXCISION OF SKIN BRIDGE;  Surgeon: Avtar Nam MD;  [facility]     EXAM UNDER ANESTHESIA ANUS N/A 1/6/2023    Procedure: EXAM UNDER ANESTHESIA, ANUS, SETON EXCHANGE, partial fistulotomy;  Surgeon: Mary Dugan MD;  [facility]     HYSTERECTOMY, VAGINAL  2006    with Dr. Licha Zhou - with BSO for fibroids      IR GALLBLADDER DRAIN PLACEMENT  4/22/2020     OPEN REDUCTION INTERNAL FIXATION ANKLE Left at age 27    plates and screws removed at age 36     Pelviscopy with removal of bilateral hydrosalpinges.  04/15/2010     [facility] APPENDECTOMY  at  age 14         SOCIAL HISTORY:  Social History     Socioeconomic History     Marital status:      Spouse name: Albino     Number of children: 3     Years of education: 14     Highest education level: Not on file   Occupational History     Occupation: unemployed   Tobacco Use     Smoking status: Never     Smokeless tobacco: Never   Vaping Use     Vaping Use: Never used   Substance and Sexual Activity     Alcohol use: Not Currently     Drug use: No     Comment: no herbal meds either     Sexual activity: Yes     Partners: Male     Birth control/protection: Post-menopausal     Comment: husb had vasectomy   Other Topics Concern      Service No     Blood Transfusions No     Caffeine Concern No     Comment: rarely drinks caffeine     Occupational Exposure Not Asked     Hobby Hazards Not Asked     Sleep Concern Not Asked     Stress Concern Not Asked     Weight Concern Not Asked     Special Diet Not Asked     Back Care Not Asked     Exercise Yes     Comment: does a lot of walking - 4x/week     Bike Helmet Not Asked     Seat Belt Yes     Comment: always     Self-Exams Yes     Comment: SBE encouraged monthly     Parent/sibling w/ CABG, MI or angioplasty before 65F 55M? Yes   Social History Narrative    calcium - drinks 5-6 large glasses skim milk/day    flex sig/colonoscopy -at age 49    sun precautions - discussed    mammogram - needs every 2 years in her 30's, then yearly from then on    Td booster - 9/99 and 4/27/2010    pneumovax -at age 59    DEXA -when perimenopausal    stool hemoccults - every year after age 40    ASA- start at age 40    mulvitamin - encouraged     Social Determinants of Health     Financial Resource Strain: Not on file   Food Insecurity: Not on file   Transportation Needs: Not on file   Physical Activity: Not on file   Stress: Not on file   Social Connections: Not on file   Intimate Partner Violence: Not on file   Housing Stability: Not on file         FAMILY HISTORY:  Family History  "  Problem Relation Age of Onset     Breast Cancer Mother      Gastrointestinal Disease Mother      Heart Disease Father 57     Cancer Sister      Skin Cancer Sister      Hypertension Maternal Grandmother      Diabetes Maternal Grandfather      Diabetes Paternal Grandmother      Heart Disease Paternal Grandfather      Anesthesia Reaction No family hx of      Thrombosis No family hx of          PHYSICAL EXAM:  Vital signs:  /80   Pulse 69   Temp 97.7  F (36.5  C) (Tympanic)   Resp 16   Ht 1.646 m (5' 4.8\")   Wt 80.9 kg (178 lb 4.8 oz)   LMP 2006   SpO2 96%   BMI 29.85 kg/m     ECO  GENERAL/CONSTITUTIONAL: No acute distress.  EYES: Pupils are equal, round, and react to light and accommodation. Extraocular movements intact.  No scleral icterus.  ENT/MOUTH: Neck supple. Oropharynx clear, no mucositis.  LYMPH: No anterior cervical, posterior cervical, supraclavicular, axillary or inguinal adenopathy.   RESPIRATORY: Clear to auscultation bilaterally. No crackles or wheezing.   CARDIOVASCULAR: Regular rate and rhythm without murmurs, gallops, or rubs.  GASTROINTESTINAL: No hepatosplenomegaly, masses, or tenderness. The patient has normal bowel sounds. No guarding.  No distention.  MUSCULOSKELETAL: Warm and well-perfused, no cyanosis, clubbing, or edema.  NEUROLOGIC: Cranial nerves II-XII are intact. Alert, oriented, answers questions appropriately.  INTEGUMENTARY: No rashes or jaundice.  GAIT: Steady, does not use assistive device.      LABS:   Latest Reference Range & Units 23 09:06   Sodium 136 - 145 mmol/L 135 (L)   Potassium 3.4 - 5.3 mmol/L 4.0   Chloride 98 - 107 mmol/L 99   Carbon Dioxide (CO2) 22 - 29 mmol/L 25   Urea Nitrogen 6.0 - 20.0 mg/dL 13.6   Creatinine 0.51 - 0.95 mg/dL 0.71   GFR Estimate >60 mL/min/1.73m2 >90   Calcium 8.6 - 10.0 mg/dL 9.4   Anion Gap 7 - 15 mmol/L 11   Albumin 3.5 - 5.2 g/dL 3.8   Protein Total 6.4 - 8.3 g/dL 8.4 (H)   Alkaline Phosphatase 35 - 104 U/L 116 " (H)   ALT 10 - 35 U/L 58 (H)   AST 10 - 35 U/L 89 (H)   AFP tumor marker <=8.3 ng/mL 262.0 (H)   Bilirubin Direct 0.00 - 0.30 mg/dL 0.63 (H)   Bilirubin Total <=1.2 mg/dL 1.5 (H)   Glucose 70 - 99 mg/dL 93   WBC 4.0 - 11.0 10e3/uL 4.9   Hemoglobin 11.7 - 15.7 g/dL 15.0   Hematocrit 35.0 - 47.0 % 45.1   Platelet Count 150 - 450 10e3/uL 153   RBC Count 3.80 - 5.20 10e6/uL 4.19   MCV 78 - 100 fL 108 (H)   MCH 26.5 - 33.0 pg 35.8 (H)   MCHC 31.5 - 36.5 g/dL 33.3   RDW 10.0 - 15.0 % 13.7   INR 0.85 - 1.15  1.08        Latest Reference Range & Units 01/20/23 09:06   AFP tumor marker <=8.3 ng/mL 262.0 (H)       PATHOLOGY:  3/1/21:  SPECIMEN(S):   A: Sigmoid colon polyp   B: Rectal biopsy     FINAL DIAGNOSIS:   A. Sigmoid Colon, Polyp, Biopsy:   Colonic mucosa with no significant histologic abnormality; no significant   cryptitis or other evidence of   active colitis; no neoplastic or hyperplastic polyp identified     B. Rectum, Biopsy:   Rectal mucosa with no granulomas, cryptitis or other histologic evidence   of active Crohn; negative for   dysplasia         IMAGING:  CT Chest 1/1/23:  FINDINGS:  ANGIOGRAM CHEST: Pulmonary arteries are normal caliber and negative for pulmonary emboli. Thoracic aorta is negative for dissection. Ascending thoracic aorta upper range of normal in size measuring 3.9 x 3.9 cm. No CT evidence of right heart strain.     LUNGS AND PLEURA: Subsegmental atelectasis or scarring right lower lobe. The lungs otherwise are clear. No acute infiltrates.     MEDIASTINUM/AXILLAE: No enlarged mediastinal or hilar lymph nodes.     CORONARY ARTERY CALCIFICATION: None.     UPPER ABDOMEN: Cirrhotic changes of the liver. 3.3 x 2.7 cm low-attenuation mass right hepatic lobe, not present previously. Trace ascites along the liver margin. Numerous collateral vessels left upper quadrant.     MUSCULOSKELETAL: Normal.                                                                      IMPRESSION:  1.  No evidence for  pulmonary emboli.  2.  No evidence for acute pulmonary disease.  3.  Cirrhotic changes of the liver with interval development of a right hepatic lobe mass, concerning for neoplasm. Dedicated CT abdomen and pelvis recommended for further evaluation.    MRI Liver 1/19/23:  IMPRESSION:    1. Cirrhosis and evidence of portal hypertension, including  splenomegaly and large portal systemic collaterals.  2. There is approximately 3.2 x 2.9 cm arterial enhancing lesion in  hepatic segment 7 along the right hemidiaphragm, with evidence of  washout and pseudocapsule, consistent with OPTN/LIRADS class 5B  lesion.  3. No other suspicious focal hepatic lesions.  4. Based on this exam only, the patient is within [place] criteria.  5. Cholelithiasis without MRI evidence of acute cholecystitis.  Possible gallbladder sludge versus small stone in the distal aspect of  the common bile duct.        ASSESSMENT/PLAN:  Abiola Matute is a 57 year old female who is referred for recent diagnosis of hepatocellular carcinoma. Past medical history is significant for cirrhosis and Crohn's disease. She is followed by Dr. Cervantes and Dr. Schilling, respectively.     1) Localized, early stage ([id number]) hepatocellular carcinoma, CP-A  -MRI showing a 3.2 x 2.9 cm mass in segment 7 with arterial washout with LR-5B.  -.  -She is considered to be a borderline candidate for surgical resection.   -She is to meet with Dr. Diaz 1/27/23 to discuss localregional treatment.   -I reviewed the patient's medical history and clinical findings. She has evidence of early disease and localregional treatment is standard of care. Her functional status remains excellent and CP-A. She does not have evidence of advanced disease and does not meet indication for systemic treatment at this time.   -Following local treatment with Dr. Diaz, I explained she will be monitored periodically with basic labs, tumor markers, and imaging to assess for evidence of  progression. In the meantime, she will continue to be followed by the surgical/transplant teams to review her candidacy for surgical resection vs transplant.   -In the event she is in need of systemic treatment in the future, we discussed HCC is historically unresponsive to chemotherapy. She is unlikely to be a candidate for immunotherapy given her underlying inflammatory bowel disease. In that event, discussion would be with oral multikinase inhibitors.     2) Cirrhosis, elevated LFTs, with portal hypertension and splenomegaly  -Followed by Dr. Cervantes.  -AST/ALT and T. Bili are at baseline.    3) Crohn's disease  -My understanding is her disease is in remission and she continues on infliximab every 4 weeks.  -Continue follow up with Dr. Schilling.     4) Follow up in 1 month.       Philly Gupta DO  Hematology/Oncology  [facility]

## 2023-01-26 NOTE — TELEPHONE ENCOUNTER
Patient called me back and stated she was in the parking lot of [employer] and had just a few minutes to complete intake. For this reason, I did not have enough time to ask all of the questions below.     Referring Provider: referral came as an [facility] staff message from Fabio Antunez, RNCC. Patient stated her hepatologist is Jeannette Cervantes  Referring Diagnosis: not provided  PCP: Linda Macdonald    1)Do you know why you have liver disease: yes       If Alcoholic Cirrhosis is present when was your last drink: almost 6 months ago       Have you ever been through treatment for alcohol: no  2) Presence of Ascites: yes Paracentesis: in the past  3) Presence of Hepatic Encephalopathy: not asked Medications: not asked  4) History of GI Bleeding: not asked  5) Oxygen Use: no  6) EGD: chart  7) Colonoscopy: Chart   8) MELD Score:    9) Labs available for review from PCP/GI: chart  10)HCC Diagnosis:                             11)Insurance information:   [employer]             Policy [place]: spouse             Subscriber/Policy/ID Number: [id number]             Group Number: [id number]    Referral intake process completed.  Patient is aware that after financial approval is received, medical records will be requested.   Patient confirmed for a callback from transplant coordinator on January 30, 2023.  Tentative evaluation date TBD.    Confirmed coordinator will discuss evaluation process in more detail at the time of their call.   Patient is aware of the need to arrange age appropriate cancer screening, vaccinations, and dental care.  Reminded patient to complete questionnaire, complete medical records release, and review packet prior to evaluation visit .  Assessed patient for special needs (ie--wheelchair, assistance, guardian, and ):  none   Patient instructed to call [phone] with questions.     Patient gave verbal consent during intake call to obtain medical records and documents outside of  [facility]:  yes

## 2023-01-26 NOTE — NURSING NOTE
"Oncology Rooming Note    January 26, 2023 1:01 PM   Abiola Matute is a 57 year old female who presents for:    Chief Complaint   Patient presents with     Oncology Clinic Visit     New Patient     Initial Vitals: /80   Pulse 69   Temp 97.7  F (36.5  C) (Tympanic)   Resp 16   Ht 1.646 m (5' 4.8\")   Wt 80.9 kg (178 lb 4.8 oz)   LMP 05/31/2006   SpO2 96%   BMI 29.85 kg/m   Estimated body mass index is 29.85 kg/m  as calculated from the following:    Height as of this encounter: 1.646 m (5' 4.8\").    Weight as of this encounter: 80.9 kg (178 lb 4.8 oz). Body surface area is 1.92 meters squared.  No Pain (0) Comment: Data Unavailable   Patient's last menstrual period was 05/31/2006.  Allergies reviewed: Yes  Medications reviewed: Yes    Medications: Medication refills not needed today.  Pharmacy name entered into [facility]:    [facility] - [place], [place] - [address] [place] [address]  [facility] - [place], [place] - [address] [facility]      Lisa Cisneros CMA              "
stated

## 2023-01-27 ENCOUNTER — ONCOLOGY VISIT (OUTPATIENT)
Dept: RADIOLOGY | Facility: CLINIC | Age: 58
End: 2023-01-27
Attending: RADIOLOGY
Payer: COMMERCIAL

## 2023-01-27 VITALS
TEMPERATURE: 99.4 F | BODY MASS INDEX: 27.08 KG/M2 | WEIGHT: 168.5 LBS | HEIGHT: 66 IN | OXYGEN SATURATION: 97 % | DIASTOLIC BLOOD PRESSURE: 82 MMHG | SYSTOLIC BLOOD PRESSURE: 123 MMHG | HEART RATE: 84 BPM | RESPIRATION RATE: 16 BRPM

## 2023-01-27 DIAGNOSIS — C22.0 HCC (HEPATOCELLULAR CARCINOMA) (H): ICD-10-CM

## 2023-01-27 DIAGNOSIS — K70.31 ALCOHOLIC CIRRHOSIS OF LIVER WITH ASCITES (H): ICD-10-CM

## 2023-01-27 PROCEDURE — 99204 OFFICE O/P NEW MOD 45 MIN: CPT | Mod: 24 | Performed by: RADIOLOGY

## 2023-01-27 NOTE — LETTER
1/27/2023         RE: Abiola Matute  [address] [facility] [address]        Dear Colleague,    Thank you for referring your patient, Abiola Matute, to the [facility]. Please see a copy of my visit note below.          Interventional Radiology Clinic Visit    Date of this visit: 1/27/2023    Abiola Matute is referred by Dr. Jeannette Cervantes for treatment recommendations. The patient requires evaluation for diagnosis of hepatocellular carcinoma (HCC).    Primary Physician: Linda Macdonald        History Of Present Illness:    Abiola Matute is a 57 year old female who presents with diagnosis of unresectable hepatocellular carcinoma (HCC) on a background of cirrhosis and Crohn's disease.     Patient is followed by Dr. Farhan Schilling from gastroenterology for her Crohn's disease and Dr. Cervantes from hepatology for her cirrhosis.  According to her gastroenterology notes, the Crohn's disease is in remission and predominantly perianal disease which is inactive.    In 12/2022, she was seen in the ED for chest pain, palpitations, and treated for cellulitis. CT chest at that time was negative for PE, but did show a new right hepatic lobe mass measuring ~3 cm. Subsequent MRI showed this was HCC. She is followed by Dr. Gupta from oncology. We discussed her at our liver tumor conference and liver-directed therapy was recommended.  She is not a surgical candidate due to the presence of portal hypertension.    Patient reports she has continued to experience some intermittent mild chest symptoms since her most recent ED visit. She notes she these are symptoms are similar to those experienced with her prior anxiety, and notes she is continuing to work up these symptoms with her PCP, with stress test being planned. Previous EKG, serial trop levels, and CT chest was negative for any pertinent cardiac findings.     She states she has felt fine overal and states she has no other  issues with anything else. Patient denies experiencing any recent fevers, nausea, vomiting, hematochezia, hemataemesis, abdominal pain, new or recent swelling, or history of confusion/encephalopathy.    There are no additional medical concerns at this time.       Review of Systems:    Negative unless otherwise stated in HPI above.      Past Medical/Surgical History:    Past Medical History:   Diagnosis Date     Alcohol abuse      Alcoholic cirrhosis of liver with ascites      Anal fistula      Atypical ductal hyperplasia of breast 09/10/2010    ERT not recommended -left - and flat epithelial atypia-scheduled for breast biopsy 9/17/2010      Bifid uvula      Cholelithiasis      Contact perianal dermatitis and other eczema     recurrent - clobetasol      Crohn disease      Fear of flying      - gets Ativan prn.      Hypertension      IBS (irritable bowel syndrome)      Past Surgical History:   Procedure Laterality Date     BIOPSY ANAL N/A 3/28/2019    anal biopsy and culure placement of seton - Dr Fleming     BIOPSY BREAST Left 09/17/2010    - scheduled with Dr. Varma      BIOPSY LIVER  2019     COLONOSCOPY  2006     COLONOSCOPY N/A 12/23/2014    Procedure: COMBINED COLONOSCOPY, SINGLE OR MULTIPLE BIOPSY/POLYPECTOMY BY BIOPSY;  Surgeon: Diane Fleming MD;  Location:  GI     COLONOSCOPY N/A 12/5/2019    Procedure: COLONOSCOPY, WITH POLYPECTOMY AND BIOPSY;  Surgeon: Farhan Schilling MD;  Location:  GI     ENDOSCOPIC RETROGRADE CHOLANGIOPANCREATOGRAM N/A 4/24/2020    Procedure: ENDOSCOPIC RETROGRADE CHOLANGIOPANCREATOGRAPHY WITH, sledge removal,sphincterotomy, stent in gallbladder and pancreatic duct stent, and balloon dilation;  Surgeon: Guru Isac Kraft MD;  Location: [facility] OR     ESOPHAGOSCOPY, GASTROSCOPY, DUODENOSCOPY (EGD), COMBINED N/A 12/5/2019    Procedure: ESOPHAGOGASTRODUODENOSCOPY (EGD);  Surgeon: Farhan Schilling MD;  Location: [facility]     EXAM UNDER ANESTHESIA ANUS N/A  3/28/2019    Procedure: EXAM UNDER ANESTHESIA ANUS;  Surgeon: Diane Fleming MD;  Location:  OR     EXAM UNDER ANESTHESIA ANUS N/A 2/26/2021    Procedure: EXAM UNDER ANESTHESIA OF ANUS, SETON PLACEMENT, EXCISION OF SKIN BRIDGE;  Surgeon: Avtar Nam MD;  Location: [facility] OR     EXAM UNDER ANESTHESIA ANUS N/A 1/6/2023    Procedure: EXAM UNDER ANESTHESIA, ANUS, SETON EXCHANGE, partial fistulotomy;  Surgeon: Mary Dugan MD;  Location: [facility] OR     HYSTERECTOMY, VAGINAL  2006    with Dr. Licha Zhou - with BSO for fibroids      IR GALLBLADDER DRAIN PLACEMENT  4/22/2020     OPEN REDUCTION INTERNAL FIXATION ANKLE Left at age 27    plates and screws removed at age 36     Pelviscopy with removal of bilateral hydrosalpinges.  04/15/2010     ZZC APPENDECTOMY  at age 14       Current Medications:    Current Outpatient Medications   Medication Sig Dispense Refill     cephALEXin (KEFLEX) 500 MG capsule Take 1 capsule (500 mg) by mouth 4 times daily 14 capsule 0     cephALEXin (KEFLEX) 500 MG capsule Take 1 capsule (500 mg) by mouth 4 times daily 14 capsule 0     furosemide (LASIX) 40 MG tablet TAKE 1 TABLET BY MOUTH  DAILY (Patient taking differently: Take 40 mg by mouth every morning) 90 tablet 3     inFLIXimab (REMICADE) 100 MG injection Inject into the vein once Next dose 12/16/22       Milk Thistle-Dand-Fennel-Licor (MILK THISTLE XTRA) CAPS capsule Take 1 capsule by mouth 2 times daily       Multiple Vitamins-Minerals (MULTIVITAMIN & MINERAL PO) Take by mouth every morning       spironolactone (ALDACTONE) 100 MG tablet TAKE 1 TABLET BY MOUTH  DAILY (Patient taking differently: Take 100 mg by mouth every morning) 90 tablet 3     triamcinolone (KENALOG) 0.1 % external cream Apply to AA BID x 1-2 week then PRN only 80 g 3       Allergies:    Fish oil, Metronidazole, Naphthalenemethylamines, Ppd [tuberculin purified protein derivative], and Sulfa drugs    Family History:    Family History   Problem  Relation Age of Onset     Breast Cancer Mother      Gastrointestinal Disease Mother      Heart Disease Father 57     Cancer Sister      Skin Cancer Sister      Hypertension Maternal Grandmother      Diabetes Maternal Grandfather      Diabetes Paternal Grandmother      Heart Disease Paternal Grandfather      Anesthesia Reaction No family hx of      Thrombosis No family hx of        Social History:    Patient currently lives at home with her  Albino and her youngest son. She is not currently working and remains fully independent and drives.    Social History     Socioeconomic History     Marital status:      Spouse name: Albino     Number of children: 3     Years of education: 14   Occupational History     Occupation: unemployed   Tobacco Use     Smoking status: Never     Smokeless tobacco: Never   Vaping Use     Vaping Use: Never used   Substance and Sexual Activity     Alcohol use: Not Currently     Drug use: No     Comment: no herbal meds either     Sexual activity: Yes     Partners: Male     Birth control/protection: Post-menopausal     Comment: harper had vasectomy   Other Topics Concern      Service No     Blood Transfusions No     Caffeine Concern No     Comment: rarely drinks caffeine     Exercise Yes     Comment: does a lot of walking - 4x/week     Seat Belt Yes     Comment: always     Self-Exams Yes     Comment: SBE encouraged monthly     Parent/sibling w/ CABG, MI or angioplasty before 65F 55M? Yes   Social History Narrative    calcium - drinks 5-6 large glasses skim milk/day    flex sig/colonoscopy -at age 49    sun precautions - discussed    mammogram - needs every 2 years in her 30's, then yearly from then on    Td booster - 9/99 and 4/27/2010    pneumovax -at age 59    DEXA -when perimenopausal    stool hemoccults - every year after age 40    ASA- start at age 39    mulvitamin - encouraged       Physical Exam:    LMP 05/31/2006      /82   Pulse 84   Temp 99.4  F (37.4  C) (Oral)   " Resp 16   Ht 1.685 m (5' 6.34\")   Wt 76.4 kg (168 lb 8 oz)   LMP 05/31/2006   SpO2 97%   BMI 26.92 kg/m       CONSTITUTIONAL: healthy, alert and no distress.  PSYCHIATRIC: mentation appears normal and affect normal.  NEURO: Normal movements and speech.  EYES: No jaundice or pallor.  SKIN: No jaundice.   RESP: No audible cough or wheeze.    Laboratory Studies:    Lab Results   Component Value Date    HGB 15.0 01/20/2023    HGB 13.7 06/22/2021     Lab Results   Component Value Date     01/20/2023    PLT 97 06/22/2021     Lab Results   Component Value Date    WBC 4.9 01/20/2023    WBC 4.0 06/22/2021       Lab Results   Component Value Date    INR 1.08 01/20/2023    INR 1.28 10/27/2020       Lab Results   Component Value Date    PROTTOTAL 8.4 01/20/2023    PROTTOTAL 8.1 06/22/2021      Lab Results   Component Value Date    ALBUMIN 3.8 01/20/2023    ALBUMIN 3.3 10/03/2022    ALBUMIN 3.6 06/22/2021     Lab Results   Component Value Date    BILITOTAL 1.5 01/20/2023    BILITOTAL 1.8 06/22/2021     Lab Results   Component Value Date    BILICONJ 0.0 05/20/2014      Lab Results   Component Value Date    ALKPHOS 116 01/20/2023    ALKPHOS 104 06/22/2021     Lab Results   Component Value Date    AST 89 01/20/2023    AST 70 06/22/2021     Lab Results   Component Value Date    ALT 58 01/20/2023    ALT 52 06/22/2021       Lab Results   Component Value Date    CR 0.71 01/20/2023    CR 0.71 02/23/2021     .0 on 1/20/23.    Imaging:     I personally reviewed and interpreted imaging as follows:    CT chest 1/1/2023 - 3.3 cm hypodense mass in segment 7 of the liver.    MRI abdomen 1/19/2023 - 3.3 cm arterially enhancing mass with washout and pseudocapsule in subdiaphragmatic portion of segment 7 of the liver.  Background liver is cirrhotic.  Mild splenomegaly with large perisplenic varices.      ASSESSMENT:      Abiola Matute is a 57 year old female who presents with diagnosis of unresectable hepatocellular " carcinoma (HCC) on a background of cirrhosis and Crohn's disease.  She has a solitary mass in segment 7 with high AFP (262.0) indicating tumor aggression.      Child-Kim score: A5  ECOG performance status: 0    The patient is a suitable candidate for radioembolization.    I showed Virginia the imaging and discussed the findings. I discussed with her that the goal of the procedure is disease control with a survival benefit rather than a cure given the nature of the disease, but that sometimes lesions will be cured in this manner. Alternatives were reviewed, including surgery, but the patient is not a surgical candidate.  I explained the radioembolization procedure  - that it is a two step procedure on two different days that involves an angiogram and nuclear medicine study on each day, and that it requires insurance pre-approval. I explained that the first procedure involves mapping the arteries to the liver and embolizing any sidebranches that place the patient at risk of non-target radiation injury, then checking for shunting to the lungs. The second procedure involves delivering the radiation beads intra-arterially and imaging the liver afterwards to assess the pattern of radiation distribution.   I explained that both procedures are performed under conscious sedation. We discussed what will happen before, during and after the procedure; what to expect in the post procedure recovery period; and what the follow-up will be. We discussed post-radioembolization side effects which consists of varying degrees of pain, nausea, and lethargy. We discussed the risks of the procedure which include, but are not limited to, vascular injury causing bleeding or occlusion of blood vessels, groin hematoma, infection, liver failure, non-target radiation injury to structures such as gallbladder/bowel/pancreas/lung.  We also discussed that the treatment sometimes need more than one treatment session to gain control of the tumor,  particularly for large or multifocal tumors, that may involve other modalities including ablation.  I also explained that new tumors may develop elsewhere given the nature of the disease. Most patients go home the same day, but occasionally circumstances are such that a longer admission may be required. All of the patient's questions were answered and they agreed to proceed.     PLAN:  Pending insurance pre-approval, we will schedule the patient for the procedure accordingly.      It was a pleasure seeing the patient.     Signed,    Francisco Pineda  Medical Student, MS3  [employer] [facility] [employer] [employer]    Co-signed,  Yarely Diaz M.D.    Interventional Radiology  Department of Radiology  [facility]    CC  Patient Care Team:  Linda Macdonald APRN CNP as PCP - General (Family Medicine)  Diane Mckay RN as Specialty Care Coordinator (Gastroenterology)  Sonia Pacheco [facility] as Pharmacist (Pharmacist Ambulatory Care)  Linda Macdonald APRN CNP as Assigned PCP  Enrique Cervantes MD as MD (Gastroenterology)  Farhan Schilling MD as MD (Gastroenterology)  Farhan Schilling MD as Referring Physician (Gastroenterology)  Juan Crawford MD as MD (Dermatology)  Farhan Schilling MD as Assigned Gastroenterology Provider  Ezio Cazares MD as MD (Dermatology)  Ezio Cazares MD as MD (Dermatology)  Susanna Palencia PA-C as Assigned Surgical Provider  ENRIQUE CERVANTES      I, Dr Yarely Diaz, was present with the fellow during the entire clinic visit, including the history and exam. I discussed the case with the fellow and agree with the findings as documented in the assessment and plan.        50 minutes spent on the date of the encounter doing chart review, history and exam, imaging review, documentation and further activities per the note.

## 2023-01-30 DIAGNOSIS — C22.0 HCC (HEPATOCELLULAR CARCINOMA) (H): Primary | ICD-10-CM

## 2023-01-31 NOTE — TELEPHONE ENCOUNTER
Virginia Riveramacarena 1965  Referral initial call 1/30/2023  MELD: 9  Blood Type: A POS  PCP:  Linda Macdonald  Referring: Fahran Choi.   Have you had care at a  Health facility before/seen by any of our doctors? Yes  Insurance: [employer]    Social Hx: lives in [place]. Grew up in [place].  Work: not working currently. 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   Education: some college.  Functional status:   Family/social support:  to  Albino for 36 years. 3 (2 girls, 1 boy) 32, 29, 24. Grandkids. Mother still alive, father passed away from heart disease.     Liver DX/History: first told of liver disease, diagnosed with cirrhosis about 4-5 months. 12/2022, went into ED w/ chest pain/cellulitis.  Dx: HCC  HE/Meds: none, none.  Hematemesis/GIB/Dark stools: difficult to say, not really.  EGD/Colonoscopy: Yes & Yes, 2019  Ascites/Paracentesis/Diuretics/TIPS/SBP: Yes Ascites, taking 100mg spironolactone and 40mg furosemide  Etoh & Drug use/Legal issues/Chem Dep: last drink about 6 months, going well. No drug use. No legal problems. No chem dependency.  HCC diagnosis: YES, MRI 1/19/23 = 3cm arterial enhancing lesion in segment 7 with washout and pseudocapsule, within Joshua criteria.  Surgeries: BSO+LEONEL d/t ovarian cysts in 2006.  LDLT discussed: yes, discussed in-depth. Pt s children have not been told of diagnosis yet.    Other Medical hx: has Crohns disease, in remission.  Neuro/Strokes/Seizures: none  Cardiac/MIs, Echos/Angios: HTN, controlled. Father passed away from heart disease, he had several siblings pass away.   Lungs/Smoking/O2 use: none. Never smoked before. No O2.  Kidneys/Dialysis: none.  Cancer/PSA/Mammo/Pap: none. Last pap smear, full hysterectomy. Last mammogram within year.  Vaccines/Hepatitis/COVID: Vaccinated for COVID and booster. Shingles vaccine.  Diabetes: none.  Nutrition: very status quo. Appetite is good.  Dentist: No major dental concerns.  Mental health:   okay  per patient report.    Plan: Do you have someone who can join you at the evaluation? Will plan for patient and spouse to come to clinic for eval on 2/14 pending PA approval.

## 2023-02-01 PROBLEM — C22.0 HCC (HEPATOCELLULAR CARCINOMA) (H): Status: ACTIVE | Noted: 2023-02-01

## 2023-02-06 ENCOUNTER — TRANSFERRED RECORDS (OUTPATIENT)
Dept: HEALTH INFORMATION MANAGEMENT | Facility: CLINIC | Age: 58
End: 2023-02-06
Payer: MEDICARE

## 2023-02-09 ENCOUNTER — TELEPHONE (OUTPATIENT)
Dept: TRANSPLANT | Facility: CLINIC | Age: 58
End: 2023-02-09

## 2023-02-09 NOTE — TELEPHONE ENCOUNTER
Patient Call: General  Route to LPN    Reason for call: called in regards to has confusion on appointment schduled on 2/14/2023/ Patient recieved with two my chart messages about time agenda for day of appointments. Looking to clarify and would like a call at [phone].    Call back needed? Yes    Return Call Needed  Same as documented in contacts section  When to return call?: Same day: Route High Priority

## 2023-02-13 LAB
ABO/RH(D): NORMAL
ANTIBODY SCREEN: NEGATIVE
SPECIMEN EXPIRATION DATE: NORMAL

## 2023-02-13 NOTE — PROGRESS NOTES
[facility]  LIVER CLINIC FOLLOW UP    A/P  Ms. Matute is a 57 Y F with DUNN and alcohol related cirrhosis and new dx HCC. She is undergoing LT evaluation. Last alcohol reported was 8 months ago. MELD 9 ABO A    HCC 3.2 cm seg 7. . She has been discussed at tumor conference and has seen IR and oncology. Staging is underway. Plan is radioembolization.     Cholecystitis Cystic duct stent placed in 2020. Follow up imaging showed expected migration. No issues since then.  Variceal screening  No varices on EGD 2019. Due. Ordered  Thrombocytopenia 2/2 ETOH and cirrhosis. Plt around 100.    Ascites Controlled with diuretics. Discussed this and low Na. [place] 100 mg and furosemide 40. Renewed.  AUD positive PETH 1/20/23. Low positive. States last alcohol was 8 mo ago. Discussed that absolute abstinence is required.  Bone health DEXA showed osteopenia. Taking ca and D. DEXA Due 2022  Crohns With chronic anorecal fistula and currently with [facility]. Sees Dr Schilling and Leonel Wilkinson (1/6/23). She is on infliximab. Have communicated with Dr. Schilling regarding her current diagnoses to ask if it would impact timing of treatments and scoping. He will review her chart and get back to me.    LE cellulitis due to skin cracks in feet. Have asked her to see podiatry for skin care recommendations.    LT Candidacy She is in eval. We discussed MELD, MELD exception.     RTC 3-4 m  Jeannette Cervantes MD  Hepatology/Liver Transplant  Medical Director, Liver Transplantation  [facility]  Subjective  Ms. Matute is a 57 Y F with alcohol related cirrhosis and new dx HCC.     Diagnosis of liver disease was made in 2019 when she was having colon evaluations with a diagnosis of perianal Crohn's and her elevated liver tests were addressed.    Admitted [facility]/21/20 for acute on chronic cholecystitis, underwent ERCP with cystic stent, pancreatic stent. AXR 5/20/20 showed cystic duct stent with migration.     She was in ED  12/31/22 for leg pain and chest pain. Diagnosis was cellulitis. As part of the workup she had CT chest and incidental lesion in liver was seen.     MRI 1/19/23   There is approximately 3.2 x 2.9 cm arterial enhancing lesion in  hepatic segment 7 along the right hemidiaphragm, with evidence of  washout and pseudocapsule, consistent with OPTN/LIRADS class 5B  Lesion      Liver biopsy 4/24/19 read by Dr. Ger Montoya  1. Steatohepatitis     a. Moderate steatosis (35%, mixed macro and microvesicular, non-zonal)     b. Mild necroinflammatory activity (grade 1 of 3)     c. Well-developed micronodular cirrhosis (stage 4 of 4)  2. Negative for autoimmune hepatitis or other intercurrent liver disease   3. See comment    A) Needle biopsy of liver provides an adequate sample, 1.8 cm in aggregate length, with approximately nine portal areas present. Bile ducts are intact. Well-developed micronodular cirrhosis is associated with overt steatohepatitis. Prominent Linh's hyaline is present, somewhat suggestive of alcoholic etiology. There is no significant lymphoplasma cellular infiltrate and no interface hepatitis to support diagnosis of autoimmune hepatitis. Trichrome stain confirms micronodular cirrhosis. An iron stain is negative.    Reviewed with Dr. Cardona.   53-year-old woman presents with abnormal liver tests and cross sectional CT suggestive of cirrhosis. The patient has been diagnosed with portal hypertension and has anal changes clinically suggestive of Crohn's disease. Serum chemistries include AST 99, ALT 65, alkaline phosphatase 151, total bilirubin 2.4, mildly elevated IgG and IgM and elevated F-actin at 28. Antinuclear antibody is negative.     AUD  Reports last alcohol as 8 mo ago. Has an NA beer on occasion.  Alcohol pattern had been 3-4 times per week, 2-6 beers depending on activities.   No CD treatment, no DWI.  PETH 1/20/23 was 59    Ascites  Para 10/9/19 3700 ml  This has resolved on kathleen 100  and furosemide 40    Crohns  She was started on infliximab 2019. She has perianal disease which Dr. Schilling describes and inactive at the time of his last note 10/4/22. The patient states it is active and she has a Seton.    Risk factors for fatty liver: was obese in the past, typically 200 pounds. No DM. HTN      Liver Function Studies - Recent Labs   Lab Test 01/20/23  0906   PROTTOTAL 8.4*   ALBUMIN 3.8   BILITOTAL 1.5*   ALKPHOS 116*   AST 89*   ALT 58*     CBC RESULTS: Recent Labs   Lab Test 01/20/23  0906   WBC 4.9   RBC 4.19   HGB 15.0   HCT 45.1   *   MCH 35.8*   MCHC 33.3   RDW 13.7             Lab Test 12/16/21  1038   PROTTOTAL 7.7   ALBUMIN 3.1*   BILITOTAL 1.3   ALKPHOS 111   AST 57*   ALT 49     Lab Test 12/16/21  1038   WBC 3.6*   RBC 4.04   HGB 14.1   HCT 41.5   *   MCH 34.9*   MCHC 34.0   RDW 13.1   PLT 98*       MELD-Na score: 9 at 1/20/2023  9:06 AM  MELD score: 9 at 1/20/2023  9:06 AM  Calculated from:  Serum Creatinine: 0.71 mg/dL (Using min of 1 mg/dL) at 1/20/2023  9:06 AM  Serum Sodium: 135 mmol/L at 1/20/2023  9:06 AM  Total Bilirubin: 1.5 mg/dL at 1/20/2023  9:06 AM  INR(ratio): 1.08 at 1/20/2023  9:06 AM  Age: 57 years    HCV neg  HBV neg  JILL neg  Factin 28  AMA neg  Iron panel sat 59, ferritin 349  No HFE testing  Liver biopsy c/w alcoholic liver disease    Past Medical History  Past Medical History:   Diagnosis Date     Alcohol abuse      Alcoholic cirrhosis of liver with ascites      Anal fistula      Atypical ductal hyperplasia of breast 09/10/2010    ERT not recommended -left - and flat epithelial atypia-scheduled for breast biopsy 9/17/2010      Bifid uvula      Cholelithiasis      Contact perianal dermatitis and other eczema     recurrent - clobetasol      Crohn disease      Fear of flying      - gets Ativan prn.      HCC (hepatocellular carcinoma) (H) 2/1/2023     Hypertension      IBS (irritable bowel syndrome)      Social History  Social History      Socioeconomic History     Marital status:      Spouse name: Albino     Number of children: 3     Years of education: 14     Highest education level: Not on file   Occupational History     Occupation: unemployed   Tobacco Use     Smoking status: Never     Smokeless tobacco: Never   Vaping Use     Vaping Use: Never used   Substance and Sexual Activity     Alcohol use: Not Currently     Comment: approx. 6 months ago     Drug use: No     Comment: no herbal meds either     Sexual activity: Yes     Partners: Male     Birth control/protection: Post-menopausal     Comment: husb had vasectomy   Other Topics Concern      Service No     Blood Transfusions No     Caffeine Concern No     Comment: rarely drinks caffeine     Occupational Exposure Not Asked     Hobby Hazards Not Asked     Sleep Concern Not Asked     Stress Concern Not Asked     Weight Concern Not Asked     Special Diet Not Asked     Back Care Not Asked     Exercise Yes     Comment: does a lot of walking - 4x/week     Bike Helmet Not Asked     Seat Belt Yes     Comment: always     Self-Exams Yes     Comment: SBE encouraged monthly     Parent/sibling w/ CABG, MI or angioplasty before 65F 55M? Yes   Social History Narrative    calcium - drinks 5-6 large glasses skim milk/day    flex sig/colonoscopy -at age 49    sun precautions - discussed    mammogram - needs every 2 years in her 30's, then yearly from then on    Td booster - 9/99 and 4/27/2010    pneumovax -at age 59    DEXA -when perimenopausal    stool hemoccults - every year after age 40    ASA- start at age 40    mulvitamin - encouraged     Social Determinants of Health     Financial Resource Strain: Not on file   Food Insecurity: Not on file   Transportation Needs: Not on file   Physical Activity: Not on file   Stress: Not on file   Social Connections: Not on file   Intimate Partner Violence: Not on file   Housing Stability: Not on file   Not currently working since January 2019. Got laid off  and hasn't returned due to health.     Family History  Family History   Problem Relation Age of Onset     Breast Cancer Mother      Gastrointestinal Disease Mother      Heart Disease Father 57     Cancer Sister      Skin Cancer Sister      Hypertension Maternal Grandmother      Diabetes Maternal Grandfather      Diabetes Paternal Grandmother      Heart Disease Paternal Grandfather      Anesthesia Reaction No family hx of      Thrombosis No family hx of    F  from heart disease    ROS 10 point ROS neg other than the symptoms noted above in the HPI.  Exam  Gen Alert pleasant NAD  Resp No difficulty breathing. No cough  Skin No Jaundice  Eyes No icterus  Neuro HERRERA  MSK no muscle wasting  Psyche Pleasant, appropriate. Well groomed.    Time today in minutes 105  Record review 20  Communication with care team 25  Face to face with patient 40  Documentation 20

## 2023-02-13 NOTE — PROGRESS NOTES
[facility] Solid [facility] Transplant  Outpatient MNT: Liver Transplant Evaluation    Current BMI: 29 (HT 65.5 in,  lbs/80 kg)  BMI is within recommendation of <45 for liver transplant    Fried Frailty-- Not Frail (1/5 points)- low activity level     FraiLT-- Robust   [email or web address]     Time Spent: 15 minutes  Visit Type: Initial   Referring Physician: Quinn   Pt accompanied by: her      History of previous txp: none     Nutrition Assessment  H/o etoh cirrhosis, Crohn's    reports seeing a  at work.     - Appetite: good/baseline   - Food allergies/intolerances: none  - Meal prep & grocery shopping: pt/  - Previous RD education: no  - Issues chewing or swallowing: no  - N/V/D/C: no  - Food access concerns: no     Vitamins, Supplements, Pertinent Meds: MVI   Herbal Medicines/Supplements: milk thistle   Protein supplement: occasional protein bar     Edema: no    Weight hx: stable     Diet Recall  Breakfast Oatmeal w/ banana or blueberries; less often avocado toast w/ egg; Greek yogurt w/ fruit, granola   Lunch    Dinner Fish 1x/week or chicken/pork/beef with pasta/rice + 1-2 veggies (green beans, asparagus, broccoli)   Snacks Grapefruit x 2-3/day now, raw veggies w/ hummus, occasional popcorn or salty snack (not often)    Beverages Water, new coffee in past few weeks, some apple cider/hot chocolate in [employer]    Dining out Has reduced in frequency to 1x/week      Physical Activity  Using treadmill until heel issue ~1 year - eczema on bottom of feet   Would walk 20-30 minutes    ADLs go ok for the most part     Nutrition Diagnosis  No nutrition diagnosis identified at this time    Nutrition Intervention  Nutrition education provided:  Discussed sodium intake (low sodium foods and drinks, seasoning food without salt and tips for low sodium diet).    Reviewed adequate protein intake. Encouraged receiving protein from both animal and plant based sources.      Reviewed post txp diet guidelines in brief (will review in further detail post txp):  (1) Review of proper food safety measures d/t immunosuppressant therapy post-op and increased risk for food-borne illness    (2) Avoid the following post txp d/t risk for rejection, unknown effects on the organs, and/or potential interactions with immunosuppressants:  - Herbal, Chinese, holistic, chiropractic, natural, alternative medicines and supplements  - Detoxes and cleanses  - Weight loss pills  - Protein powders or other products with extracts or herbs (ie green tea extract)    (3) Med regimen and possible side effects    Patient Understanding: Pt verbalized understanding of education provided.  Expected Engagement: Good   Follow-Up Plans: PRN     Nutrition Goals  No nutrition goals identified at this time    Ambre Ortega, RD, LD, CCTD

## 2023-02-14 ENCOUNTER — ANCILLARY PROCEDURE (OUTPATIENT)
Dept: ULTRASOUND IMAGING | Facility: CLINIC | Age: 58
End: 2023-02-14
Attending: INTERNAL MEDICINE
Payer: COMMERCIAL

## 2023-02-14 ENCOUNTER — OFFICE VISIT (OUTPATIENT)
Dept: TRANSPLANT | Facility: CLINIC | Age: 58
End: 2023-02-14
Attending: INTERNAL MEDICINE
Payer: COMMERCIAL

## 2023-02-14 ENCOUNTER — LAB (OUTPATIENT)
Dept: LAB | Facility: CLINIC | Age: 58
End: 2023-02-14
Attending: INTERNAL MEDICINE
Payer: COMMERCIAL

## 2023-02-14 ENCOUNTER — OFFICE VISIT (OUTPATIENT)
Dept: GASTROENTEROLOGY | Facility: CLINIC | Age: 58
End: 2023-02-14
Attending: INTERNAL MEDICINE
Payer: COMMERCIAL

## 2023-02-14 ENCOUNTER — COMMITTEE REVIEW (OUTPATIENT)
Dept: TRANSPLANT | Facility: CLINIC | Age: 58
End: 2023-02-14

## 2023-02-14 ENCOUNTER — ANCILLARY PROCEDURE (OUTPATIENT)
Dept: GENERAL RADIOLOGY | Facility: CLINIC | Age: 58
End: 2023-02-14
Attending: INTERNAL MEDICINE
Payer: COMMERCIAL

## 2023-02-14 VITALS
HEART RATE: 88 BPM | BODY MASS INDEX: 28.57 KG/M2 | OXYGEN SATURATION: 97 % | DIASTOLIC BLOOD PRESSURE: 86 MMHG | HEIGHT: 66 IN | SYSTOLIC BLOOD PRESSURE: 141 MMHG | WEIGHT: 177.8 LBS

## 2023-02-14 VITALS
OXYGEN SATURATION: 97 % | BODY MASS INDEX: 28.45 KG/M2 | HEIGHT: 66 IN | DIASTOLIC BLOOD PRESSURE: 86 MMHG | HEART RATE: 88 BPM | SYSTOLIC BLOOD PRESSURE: 141 MMHG | WEIGHT: 177 LBS

## 2023-02-14 DIAGNOSIS — K76.6 PORTAL HYPERTENSION (H): ICD-10-CM

## 2023-02-14 DIAGNOSIS — C22.0 HEPATOCELLULAR CARCINOMA (H): ICD-10-CM

## 2023-02-14 DIAGNOSIS — K70.31 ALCOHOLIC CIRRHOSIS OF LIVER WITH ASCITES (H): ICD-10-CM

## 2023-02-14 DIAGNOSIS — C22.0 HCC (HEPATOCELLULAR CARCINOMA) (H): Primary | ICD-10-CM

## 2023-02-14 LAB
ABO/RH(D): NORMAL
AFP SERPL-MCNC: 271 NG/ML
ALBUMIN MFR UR ELPH: <6 MG/DL (ref 1–14)
ALBUMIN SERPL BCG-MCNC: 3.9 G/DL (ref 3.5–5.2)
ALBUMIN UR-MCNC: NEGATIVE MG/DL
ALP SERPL-CCNC: 113 U/L (ref 35–104)
ALT SERPL W P-5'-P-CCNC: 44 U/L (ref 10–35)
ANION GAP SERPL CALCULATED.3IONS-SCNC: 10 MMOL/L (ref 7–15)
ANTIBODY TITER IGM SCREEN: NEGATIVE
APPEARANCE UR: CLEAR
AST SERPL W P-5'-P-CCNC: 85 U/L (ref 10–35)
B IGG TITR SERPL: 128 {TITER}
B IGM TITR SERPL: 128 {TITER}
BILIRUB DIRECT SERPL-MCNC: 0.56 MG/DL (ref 0–0.3)
BILIRUB SERPL-MCNC: 1.7 MG/DL
BILIRUB UR QL STRIP: NEGATIVE
BUN SERPL-MCNC: 10.5 MG/DL (ref 6–20)
CALCIUM SERPL-MCNC: 9.6 MG/DL (ref 8.6–10)
CHLORIDE SERPL-SCNC: 102 MMOL/L (ref 98–107)
CHOLEST SERPL-MCNC: 149 MG/DL
COLOR UR AUTO: NORMAL
CREAT SERPL-MCNC: 0.68 MG/DL (ref 0.51–0.95)
CREAT UR-MCNC: 9.4 MG/DL
DEPRECATED CALCIDIOL+CALCIFEROL SERPL-MC: 21 UG/L (ref 20–75)
DEPRECATED HCO3 PLAS-SCNC: 26 MMOL/L (ref 22–29)
ERYTHROCYTE [DISTWIDTH] IN BLOOD BY AUTOMATED COUNT: 13.2 % (ref 10–15)
FERRITIN SERPL-MCNC: 386 NG/ML (ref 11–328)
GFR SERPL CREATININE-BSD FRML MDRD: >90 ML/MIN/1.73M2
GLUCOSE SERPL-MCNC: 100 MG/DL (ref 70–99)
GLUCOSE UR STRIP-MCNC: NEGATIVE MG/DL
HAV IGG SER QL IA: NONREACTIVE
HBA1C MFR BLD: 4.7 %
HBV CORE AB SERPL QL IA: NONREACTIVE
HBV SURFACE AB SERPL IA-ACNC: 1.41 M[IU]/ML
HBV SURFACE AB SERPL IA-ACNC: NONREACTIVE M[IU]/ML
HBV SURFACE AG SERPL QL IA: NONREACTIVE
HCG SERPL QL: NEGATIVE
HCT VFR BLD AUTO: 45.4 % (ref 35–47)
HCV AB SERPL QL IA: NONREACTIVE
HDLC SERPL-MCNC: 53 MG/DL
HGB BLD-MCNC: 15.2 G/DL (ref 11.7–15.7)
HGB UR QL STRIP: NEGATIVE
HIV 1+2 AB+HIV1 P24 AG SERPL QL IA: NONREACTIVE
INR PPP: 1.01 (ref 0.85–1.15)
IRON BINDING CAPACITY (ROCHE): 273 UG/DL (ref 240–430)
IRON SATN MFR SERPL: 60 % (ref 15–46)
IRON SERPL-MCNC: 165 UG/DL (ref 37–145)
KETONES UR STRIP-MCNC: NEGATIVE MG/DL
LDLC SERPL CALC-MCNC: 80 MG/DL
LEUKOCYTE ESTERASE UR QL STRIP: NEGATIVE
MCH RBC QN AUTO: 34.9 PG (ref 26.5–33)
MCHC RBC AUTO-ENTMCNC: 33.5 G/DL (ref 31.5–36.5)
MCV RBC AUTO: 104 FL (ref 78–100)
NITRATE UR QL: NEGATIVE
NONHDLC SERPL-MCNC: 96 MG/DL
PH UR STRIP: 7 [PH] (ref 5–7)
PHOSPHATE SERPL-MCNC: 4.3 MG/DL (ref 2.5–4.5)
PLATELET # BLD AUTO: 127 10E3/UL (ref 150–450)
POTASSIUM SERPL-SCNC: 3.7 MMOL/L (ref 3.4–5.3)
PROT SERPL-MCNC: 8.6 G/DL (ref 6.4–8.3)
PROT/CREAT 24H UR: NORMAL MG/G{CREAT}
RBC # BLD AUTO: 4.36 10E6/UL (ref 3.8–5.2)
SODIUM SERPL-SCNC: 138 MMOL/L (ref 136–145)
SP GR UR STRIP: 1 (ref 1–1.03)
SPECIMEN EXPIRATION DATE: NORMAL
SPECIMEN EXPIRATION DATE: NORMAL
T PALLIDUM AB SER QL: NONREACTIVE
TRANSFERRIN SERPL-MCNC: 232 MG/DL (ref 200–360)
TRIGL SERPL-MCNC: 78 MG/DL
TSH SERPL DL<=0.005 MIU/L-ACNC: 2.63 UIU/ML (ref 0.3–4.2)
UROBILINOGEN UR STRIP-MCNC: NORMAL MG/DL
WBC # BLD AUTO: 3.7 10E3/UL (ref 4–11)

## 2023-02-14 PROCEDURE — 82306 VITAMIN D 25 HYDROXY: CPT | Performed by: INTERNAL MEDICINE

## 2023-02-14 PROCEDURE — 83036 HEMOGLOBIN GLYCOSYLATED A1C: CPT | Performed by: INTERNAL MEDICINE

## 2023-02-14 PROCEDURE — 80061 LIPID PANEL: CPT | Performed by: PATHOLOGY

## 2023-02-14 PROCEDURE — 86704 HEP B CORE ANTIBODY TOTAL: CPT | Performed by: INTERNAL MEDICINE

## 2023-02-14 PROCEDURE — 84156 ASSAY OF PROTEIN URINE: CPT | Performed by: INTERNAL MEDICINE

## 2023-02-14 PROCEDURE — 85027 COMPLETE CBC AUTOMATED: CPT | Performed by: PATHOLOGY

## 2023-02-14 PROCEDURE — 82728 ASSAY OF FERRITIN: CPT | Performed by: INTERNAL MEDICINE

## 2023-02-14 PROCEDURE — 80307 DRUG TEST PRSMV CHEM ANLYZR: CPT | Mod: 90 | Performed by: PATHOLOGY

## 2023-02-14 PROCEDURE — 86708 HEPATITIS A ANTIBODY: CPT | Performed by: INTERNAL MEDICINE

## 2023-02-14 PROCEDURE — 86886 COOMBS TEST INDIRECT TITER: CPT | Performed by: INTERNAL MEDICINE

## 2023-02-14 PROCEDURE — 82248 BILIRUBIN DIRECT: CPT | Performed by: PATHOLOGY

## 2023-02-14 PROCEDURE — 93975 VASCULAR STUDY: CPT | Mod: GC | Performed by: RADIOLOGY

## 2023-02-14 PROCEDURE — 99215 OFFICE O/P EST HI 40 MIN: CPT | Performed by: INTERNAL MEDICINE

## 2023-02-14 PROCEDURE — 71046 X-RAY EXAM CHEST 2 VIEWS: CPT | Mod: GC | Performed by: STUDENT IN AN ORGANIZED HEALTH CARE EDUCATION/TRAINING PROGRAM

## 2023-02-14 PROCEDURE — 99000 SPECIMEN HANDLING OFFICE-LAB: CPT | Performed by: PATHOLOGY

## 2023-02-14 PROCEDURE — 86780 TREPONEMA PALLIDUM: CPT | Performed by: INTERNAL MEDICINE

## 2023-02-14 PROCEDURE — 86644 CMV ANTIBODY: CPT | Performed by: INTERNAL MEDICINE

## 2023-02-14 PROCEDURE — 86665 EPSTEIN-BARR CAPSID VCA: CPT | Performed by: INTERNAL MEDICINE

## 2023-02-14 PROCEDURE — 86850 RBC ANTIBODY SCREEN: CPT | Performed by: INTERNAL MEDICINE

## 2023-02-14 PROCEDURE — 86481 TB AG RESPONSE T-CELL SUSP: CPT | Performed by: INTERNAL MEDICINE

## 2023-02-14 PROCEDURE — 87340 HEPATITIS B SURFACE AG IA: CPT | Performed by: INTERNAL MEDICINE

## 2023-02-14 PROCEDURE — 83550 IRON BINDING TEST: CPT | Performed by: PATHOLOGY

## 2023-02-14 PROCEDURE — 83540 ASSAY OF IRON: CPT | Performed by: PATHOLOGY

## 2023-02-14 PROCEDURE — 86706 HEP B SURFACE ANTIBODY: CPT | Performed by: INTERNAL MEDICINE

## 2023-02-14 PROCEDURE — G0463 HOSPITAL OUTPT CLINIC VISIT: HCPCS | Performed by: TRANSPLANT SURGERY

## 2023-02-14 PROCEDURE — 85610 PROTHROMBIN TIME: CPT | Performed by: PATHOLOGY

## 2023-02-14 PROCEDURE — 84100 ASSAY OF PHOSPHORUS: CPT | Performed by: PATHOLOGY

## 2023-02-14 PROCEDURE — 86803 HEPATITIS C AB TEST: CPT | Performed by: INTERNAL MEDICINE

## 2023-02-14 PROCEDURE — 80349 CANNABINOIDS NATURAL: CPT | Mod: 90 | Performed by: PATHOLOGY

## 2023-02-14 PROCEDURE — 84703 CHORIONIC GONADOTROPIN ASSAY: CPT | Performed by: PATHOLOGY

## 2023-02-14 PROCEDURE — 99417 PROLNG OP E/M EACH 15 MIN: CPT | Performed by: INTERNAL MEDICINE

## 2023-02-14 PROCEDURE — 80321 ALCOHOLS BIOMARKERS 1OR 2: CPT | Mod: 90 | Performed by: PATHOLOGY

## 2023-02-14 PROCEDURE — 99205 OFFICE O/P NEW HI 60 MIN: CPT | Performed by: TRANSPLANT SURGERY

## 2023-02-14 PROCEDURE — 86901 BLOOD TYPING SEROLOGIC RH(D): CPT | Performed by: INTERNAL MEDICINE

## 2023-02-14 PROCEDURE — 36415 COLL VENOUS BLD VENIPUNCTURE: CPT | Performed by: PATHOLOGY

## 2023-02-14 PROCEDURE — 84443 ASSAY THYROID STIM HORMONE: CPT | Performed by: PATHOLOGY

## 2023-02-14 PROCEDURE — 80053 COMPREHEN METABOLIC PANEL: CPT | Performed by: PATHOLOGY

## 2023-02-14 PROCEDURE — 82105 ALPHA-FETOPROTEIN SERUM: CPT | Performed by: INTERNAL MEDICINE

## 2023-02-14 PROCEDURE — 84466 ASSAY OF TRANSFERRIN: CPT | Performed by: INTERNAL MEDICINE

## 2023-02-14 NOTE — COMMITTEE REVIEW
Abdominal Committee Review Note     Evaluation Date: 2/14/2023  Committee Review Date: 2/14/2023    Organ being evaluated for: Liver    Transplant Phase: Evaluation  Transplant Status: Active    Transplant Coordinator: Fabio Antunez  Transplant Surgeon:   Pravin Ball    Referring Physician: Farhan Schilling    Primary Diagnosis:   Secondary Diagnosis:     Committee Review Members:  Anesthesiology Huy Agee, DO   Gastroenterology Lance Montoya MD   Nutrition Amber Ortega, RD   Pharmacist Lisa Sorensen, [facility]    - Clinical Angel High, MSW, Celia Quiñonez, [facility]   Transplant Diane Oshea, RN, Michelle Starks, WILLEMN, Tyra Marion, RN, Silva Remy LPN, Sisi Garcia RN, Jr Gm Reyes, KP, Lacy Moreno, RN, Jane Kapadia, APRN CNP, Fabio Antunez, RN   Transplant Hepatology  Lisa Messina MD, Jeannette Cervantes MD, Leah Stout MD, Mecca Rust MD, Nick Lopez MD, Francisco Giron MD, Thomas M. Leventhal, MD   Transplant Surgery Nomi Bal MD, Jim Mitchell MD, Andrew Emily Agee MD, Pravin Ball MD       Transplant Eligibility: Cirrhosis with MELD, ETOH, HCC    Committee Review Decision: Needs Re-presentation    Relative Contraindications:     Absolute Contraindications:     Committee Chair Leventhal, Thomas Michael, MD verbally attested to the committee's decision.    Committee Discussion Details: Will represent pending completion of evaluation.

## 2023-02-14 NOTE — PROGRESS NOTES
"\"Much or all of the text in this note was generated through the use of Dragon Dictate voice to text software. Errors in spelling or words which appear to be out of context are unintentional, may be present due having escaped editing\"    Assessment and Plan:  1. liver transplant evaluation - patient is a good candidate overall. Benefits and surgical risks of a liver transplantation were discussed.  2.  End stage liver disease due to Laennec's    Surgical evaluation:  1. Portal Vein:Patent  2. Hepatic Artery: Open previous chemoembolization  3. TIPS: absent  4. Previous Abdominal Surgery: Yes  Cholecystostomy tube placement 3 years ago; appendectomy and hysterectomy  5. Hepatocellular Carcinoma: Yes - within Joshua criteria  6. Ascites: Present - minimal  7. Costal Angle: wide  8. Portopulmonary Hypertension: absent  9. Hepatopulmonary Syndrome: absent  10. Cardiac Evaluation: needs stress echocardiogram  11. Nutritional Status: Good  12. Diabetes: no  13.Hypertension  yes  14. Smoker: no  14: Fraility index: no  15. Meets guidelines to receive Living Donor  Yes -  ....  16. Potential Living donors No  MELD-Na score: 8 at 2/14/2023  8:24 AM  MELD score: 8 at 2/14/2023  8:24 AM  Calculated from:  Serum Creatinine: 0.68 mg/dL (Using min of 1 mg/dL) at 2/14/2023  8:24 AM  Serum Sodium: 138 mmol/L (Using max of 137 mmol/L) at 2/14/2023  8:24 AM  Total Bilirubin: 1.7 mg/dL at 2/14/2023  8:24 AM  INR(ratio): 1.01 at 2/14/2023  8:24 AM  Age: 57 years  Recommendations:   Patient needs to complete work-up, complete the radioembolization no surgical contraindications.      Patients overall evaluation will be discussed at the Liver Transplant selection committee meeting with a final recommendation on the patients suitability for transplant to be made at that time.    Consult Full  Details:  Abiola Matute was seen in consultation at the request of Dr. Jeannette Cervantes for evaluation as a potential liver transplant " recipient.    Reason for Visit:  Abiola Matute is a 57 year old year old female with Laennec's, who presents for liver transplant evaluation.    HPI:  Presenting complaint: Liver cancer    Patient was diagnosed to have cirrhosis of the liver approximately 3 years ago.  She also has Crohn's disease.  Crohn's disease is being followed by Dr. Mary TRAVIS.  She recently was seen in the emergency room for cellulitis of the foot.  At that time he CT scan showed she had a liver lesion.  The liver lesion was confirmed to be a hepatocellular carcinoma size 3 cm.  She is scheduled to have a radioembolization of this lesion.    She does not give any history of variceal bleeding or ascites or encephalopathy.  Her Crohn's disease is fairly under good control.    3 years ago she had acute cholecystitis and had a cholecystostomy tube as she was deemed high risk for a cholecystectomy at that time.    Present was her  who is very supportive.    Past Medical History:   Diagnosis Date     Alcohol abuse      Alcoholic cirrhosis of liver with ascites      Anal fistula      Atypical ductal hyperplasia of breast 09/10/2010    ERT not recommended -left - and flat epithelial atypia-scheduled for breast biopsy 9/17/2010      Bifid uvula      Cholelithiasis      Contact perianal dermatitis and other eczema     recurrent - clobetasol      Crohn disease      Fear of flying      - gets Ativan prn.      HCC (hepatocellular carcinoma) (H) 2/1/2023     Hypertension      IBS (irritable bowel syndrome)      Past Surgical History:   Procedure Laterality Date     BIOPSY ANAL N/A 3/28/2019    anal biopsy and culure placement of seton - Dr Fleming     BIOPSY BREAST Left 09/17/2010    - scheduled with Dr. Varma      BIOPSY LIVER  2019     COLONOSCOPY  2006     COLONOSCOPY N/A 12/23/2014    Procedure: COMBINED COLONOSCOPY, SINGLE OR MULTIPLE BIOPSY/POLYPECTOMY BY BIOPSY;  Surgeon: Diane Fleming MD;  Location:  GI     COLONOSCOPY N/A  12/5/2019    Procedure: COLONOSCOPY, WITH POLYPECTOMY AND BIOPSY;  Surgeon: Farhan Schilling MD;  Location: [facility] GI     ENDOSCOPIC RETROGRADE CHOLANGIOPANCREATOGRAM N/A 4/24/2020    Procedure: ENDOSCOPIC RETROGRADE CHOLANGIOPANCREATOGRAPHY WITH, sledge removal,sphincterotomy, stent in gallbladder and pancreatic duct stent, and balloon dilation;  Surgeon: Guru Isac Kraft MD;  Location: [facility] OR     ESOPHAGOSCOPY, GASTROSCOPY, DUODENOSCOPY (EGD), COMBINED N/A 12/5/2019    Procedure: ESOPHAGOGASTRODUODENOSCOPY (EGD);  Surgeon: Farhan Schilling MD;  Location: [facility] GI     EXAM UNDER ANESTHESIA ANUS N/A 3/28/2019    Procedure: EXAM UNDER ANESTHESIA ANUS;  Surgeon: Diane Fleming MD;  Location:  OR     EXAM UNDER ANESTHESIA ANUS N/A 2/26/2021    Procedure: EXAM UNDER ANESTHESIA OF ANUS, SETON PLACEMENT, EXCISION OF SKIN BRIDGE;  Surgeon: Avtar Nam MD;  Location: [facility] OR     EXAM UNDER ANESTHESIA ANUS N/A 1/6/2023    Procedure: EXAM UNDER ANESTHESIA, ANUS, SETON EXCHANGE, partial fistulotomy;  Surgeon: Mary Dugan MD;  Location: [facility] OR     HYSTERECTOMY, VAGINAL  2006    with Dr. Licha Zhou - with BSO for fibroids      IR GALLBLADDER DRAIN PLACEMENT  4/22/2020     OPEN REDUCTION INTERNAL FIXATION ANKLE Left at age 27    plates and screws removed at age 36     Pelviscopy with removal of bilateral hydrosalpinges.  04/15/2010     ZZC APPENDECTOMY  at age 14     Past Surgical History:   Procedure Laterality Date     BIOPSY ANAL N/A 3/28/2019    anal biopsy and culure placement of seton - Dr Fleming     BIOPSY BREAST Left 09/17/2010    - scheduled with Dr. Varma      BIOPSY LIVER  2019     COLONOSCOPY  2006     COLONOSCOPY N/A 12/23/2014    Procedure: COMBINED COLONOSCOPY, SINGLE OR MULTIPLE BIOPSY/POLYPECTOMY BY BIOPSY;  Surgeon: Diane Fleming MD;  Location:  GI     COLONOSCOPY N/A 12/5/2019    Procedure: COLONOSCOPY, WITH POLYPECTOMY AND BIOPSY;  Surgeon:  Farhan Schilling MD;  Location: [facility]     ENDOSCOPIC RETROGRADE CHOLANGIOPANCREATOGRAM N/A 4/24/2020    Procedure: ENDOSCOPIC RETROGRADE CHOLANGIOPANCREATOGRAPHY WITH, sledge removal,sphincterotomy, stent in gallbladder and pancreatic duct stent, and balloon dilation;  Surgeon: Guru Isac Kraft MD;  Location: [facility] OR     ESOPHAGOSCOPY, GASTROSCOPY, DUODENOSCOPY (EGD), COMBINED N/A 12/5/2019    Procedure: ESOPHAGOGASTRODUODENOSCOPY (EGD);  Surgeon: Farhan Schilling MD;  Location: [facility] GI     EXAM UNDER ANESTHESIA ANUS N/A 3/28/2019    Procedure: EXAM UNDER ANESTHESIA ANUS;  Surgeon: Diane Fleming MD;  Location:  OR     EXAM UNDER ANESTHESIA ANUS N/A 2/26/2021    Procedure: EXAM UNDER ANESTHESIA OF ANUS, SETON PLACEMENT, EXCISION OF SKIN BRIDGE;  Surgeon: Avtar Nam MD;  [place] [facility] [place]     EXAM UNDER ANESTHESIA ANUS N/A 1/6/2023    Procedure: EXAM UNDER ANESTHESIA, ANUS, SETON EXCHANGE, partial fistulotomy;  Surgeon: aMry Dugan MD;  [place] [facility] [place]     HYSTERECTOMY, VAGINAL  2006    with Dr. Licha Zhou - with BSO for fibroids      IR GALLBLADDER DRAIN PLACEMENT  4/22/2020     OPEN REDUCTION INTERNAL FIXATION ANKLE Left at age 27    plates and screws removed at age 36     Pelviscopy with removal of bilateral hydrosalpinges.  04/15/2010     ZZC APPENDECTOMY  at age 14     Family History   Problem Relation Age of Onset     Breast Cancer Mother      Gastrointestinal Disease Mother      Heart Disease Father 57     Cancer Sister      Skin Cancer Sister      Hypertension Maternal Grandmother      Diabetes Maternal Grandfather      Diabetes Paternal Grandmother      Heart Disease Paternal Grandfather      Anesthesia Reaction No family hx of      Thrombosis No family hx of      Allergies   Allergen Reactions     Fish Oil      Redness and itching around eye area only - went away when fish oil capsules stopped      Metronidazole      pain/itching      Naphthalenemethylamines      Lamisil = mild urticarial reaction     Ppd [Tuberculin Purified Protein Derivative]      Sulfa Drugs      hives     Prior to Admission medications    Medication Sig Start Date End Date Taking? Authorizing Provider   cephALEXin (KEFLEX) 500 MG capsule Take 1 capsule (500 mg) by mouth 4 times daily 1/1/23   Yolande Godwin MD   cephALEXin (KEFLEX) 500 MG capsule Take 1 capsule (500 mg) by mouth 4 times daily 1/1/23   Yolande Godwin MD   furosemide (LASIX) 40 MG tablet TAKE 1 TABLET BY MOUTH  DAILY  Patient taking differently: Take 40 mg by mouth every morning 6/1/22   Jeannette Cervantes MD   inFLIXimab (REMICADE) 100 MG injection Inject into the vein once Next dose 12/16/22 2/23/21   Linda Macdonald APRN CNP   Milk Thistle-Dand-Fennel-Licor (MILK THISTLE XTRA) CAPS capsule Take 1 capsule by mouth 2 times daily    Unknown, Entered By History   Multiple Vitamins-Minerals (MULTIVITAMIN & MINERAL PO) Take by mouth every morning    Reported, Patient   spironolactone (ALDACTONE) 100 MG tablet TAKE 1 TABLET BY MOUTH  DAILY  Patient taking differently: Take 100 mg by mouth every morning 6/1/22   Jeannette Cervantes MD   triamcinolone (KENALOG) 0.1 % external cream Apply to AA BID x 1-2 week then PRN only 6/13/22   Susanna Palencia PA-C       Previous Transplant Hx: No    Cardiovascular Hx:       h/o Cardiac Issues: No       Exercise Tolerance: no chest pain or shortness of breath with exertion.    Potential Donor(s): No    ROS:    REVIEW OF SYSTEMS (check box if normal)  [x]                GENERAL  [x]                  PULMONARY [x]                 GENITOURINARY  [x]                 CNS                 [x]                  CARDIAC  [x]                  ENDOCRINE  [x]                 EARS,NOSE,THROAT [x]                  GASTROINTESTINAL [x]                  NEUROLOGIC    [x]                 MUSCLOSKELTAL  [x]                    "HEMATOLOGY    Examination:     Vitals:  BP (!) 141/86   Pulse 88   Ht 1.664 m (5' 5.5\")   Wt 80.3 kg (177 lb)   LMP 05/31/2006   SpO2 97%   BMI 29.01 kg/m      GENERAL APPEARANCE: alert and no distress  EYES: PERRL  HENT: mouth without ulcers or lesions  NECK: supple, no adenopathy  RESP: lungs clear to auscultation - no rales, rhonchi or wheezes  CV: regular rhythm, normal rate, no rub   ABDOMEN:  soft, nontender, no HSM or masses and bowel sounds normal  MS: extremities normal- no gross deformities noted, no evidence of inflammation in joints, no muscle tenderness  SKIN: no rash  NEURO: Normal strength and tone, sensory exam grossly normal, mentation intact and speech normal  PSYCH: mentation appears normal. and affect normal/bright      Results:   Recent Results (from the past 168 hour(s))   Lipid Profile    Collection Time: 02/14/23  8:24 AM   Result Value Ref Range    Cholesterol 149 <200 mg/dL    Triglycerides 78 <150 mg/dL    Direct Measure HDL 53 >=50 mg/dL    LDL Cholesterol Calculated 80 <=100 mg/dL    Non HDL Cholesterol 96 <130 mg/dL   Basic metabolic panel    Collection Time: 02/14/23  8:24 AM   Result Value Ref Range    Sodium 138 136 - 145 mmol/L    Potassium 3.7 3.4 - 5.3 mmol/L    Chloride 102 98 - 107 mmol/L    Carbon Dioxide (CO2) 26 22 - 29 mmol/L    Anion Gap 10 7 - 15 mmol/L    Urea Nitrogen 10.5 6.0 - 20.0 mg/dL    Creatinine 0.68 0.51 - 0.95 mg/dL    Calcium 9.6 8.6 - 10.0 mg/dL    Glucose 100 (H) 70 - 99 mg/dL    GFR Estimate >90 >60 mL/min/1.73m2   Hepatic panel    Collection Time: 02/14/23  8:24 AM   Result Value Ref Range    Protein Total 8.6 (H) 6.4 - 8.3 g/dL    Albumin 3.9 3.5 - 5.2 g/dL    Bilirubin Total 1.7 (H) <=1.2 mg/dL    Alkaline Phosphatase 113 (H) 35 - 104 U/L    AST 85 (H) 10 - 35 U/L    ALT 44 (H) 10 - 35 U/L    Bilirubin Direct 0.56 (H) 0.00 - 0.30 mg/dL   HCG qualitative (female only)    Collection Time: 02/14/23  8:24 AM   Result Value Ref Range    hCG Serum " Qualitative Negative Negative   Iron and iron binding capacity    Collection Time: 02/14/23  8:24 AM   Result Value Ref Range    Iron 165 (H) 37 - 145 ug/dL    Iron Binding Capacity 273 240 - 430 ug/dL    Iron Sat Index 60 (H) 15 - 46 %   Phosphorus    Collection Time: 02/14/23  8:24 AM   Result Value Ref Range    Phosphorus 4.3 2.5 - 4.5 mg/dL   TSH with free T4 reflex    Collection Time: 02/14/23  8:24 AM   Result Value Ref Range    TSH 2.63 0.30 - 4.20 uIU/mL   INR    Collection Time: 02/14/23  8:24 AM   Result Value Ref Range    INR 1.01 0.85 - 1.15   CBC with platelets    Collection Time: 02/14/23  8:24 AM   Result Value Ref Range    WBC Count 3.7 (L) 4.0 - 11.0 10e3/uL    RBC Count 4.36 3.80 - 5.20 10e6/uL    Hemoglobin 15.2 11.7 - 15.7 g/dL    Hematocrit 45.4 35.0 - 47.0 %     (H) 78 - 100 fL    MCH 34.9 (H) 26.5 - 33.0 pg    MCHC 33.5 31.5 - 36.5 g/dL    RDW 13.2 10.0 - 15.0 %    Platelet Count 127 (L) 150 - 450 10e3/uL   UA reflex to Microscopic and Culture    Collection Time: 02/14/23  8:33 AM    Specimen: Urine, NOS   Result Value Ref Range    Color Urine Straw Colorless, Straw, Light Yellow, Yellow    Appearance Urine Clear Clear    Glucose Urine Negative Negative mg/dL    Bilirubin Urine Negative Negative    Ketones Urine Negative Negative mg/dL    Specific Gravity Urine 1.005 1.003 - 1.035    Blood Urine Negative Negative    pH Urine 7.0 5.0 - 7.0    Protein Albumin Urine Negative Negative mg/dL    Urobilinogen Urine Normal Normal, 2.0 mg/dL    Nitrite Urine Negative Negative    Leukocyte Esterase Urine Negative Negative     I had a long discussion with the patient regarding liver transplantation which included but was not limited to  the following points:    1. Liver transplant selection committee process.  2. The federal rules for cadaveric waiting list, the size and blood type matching of the organ. The availability of living-related donor transplantation.  3. The types of donors: brain death  donors, non-heart beating donors, partial liver grafts: splits and living donor grafts  4. Extended criteria  Donors (older age, steasosis) and the increased  risk of primary non-function using the extended criteria donors  5. The CDC high risk donors,  Risk of donor transmitted infections and donor transmitted malignancy  6. The liver transplant operation and the associated risks and technical complications which can include intraoperative death, post operative death,  Primary non-function, bleeding requiring re-operations, arterial and biliary complications, bowel perforations, and intra abdominal abscess. Some of these complicaitons may require a second operation.  7. The postoperative course, the ICU stay and risk of postoperative complications which can include sepsis, MI, stroke, brain injury, pneumonia, pleural effusions, and renal dysfunction.  8. The current 1 year and 5 year graft and patient survivals.  9. The need for life long immunosuppressive therapy and the side effects of these medications, including the possibility of toxicity, opportunistic infections, risk of cancer including lymphoma, and the possibility of rejection even if the patient is taking the medication exactly as prescribed.  10. The need for compliance with medications and follow-up visits in the clinic and thereafter.  11. The patient and family understand these risks and wish to proceed to transplantation     Review of prior external note(s) from - Notes from other providers within the healthcare system.  60 minutes spent on the date of the encounter doing chart review, history and exam, documentation and further activities per the note

## 2023-02-14 NOTE — LETTER
"    2/14/2023         RE: Abiola Matute  [address] [facility] [id number]        Dear Colleague,    Thank you for referring your patient, Abiola Matute, to the [facility]. Please see a copy of my visit note below.    \"Much or all of the text in this note was generated through the use of Dragon Dictate voice to text software. Errors in spelling or words which appear to be out of context are unintentional, may be present due having escaped editing\"    Assessment and Plan:  1. liver transplant evaluation - patient is a good candidate overall. Benefits and surgical risks of a liver transplantation were discussed.  2.  End stage liver disease due to Laennec's    Surgical evaluation:  1. Portal Vein:Patent  2. Hepatic Artery: Open previous chemoembolization  3. TIPS: absent  4. Previous Abdominal Surgery: Yes  Cholecystostomy tube placement 3 years ago; appendectomy and hysterectomy  5. Hepatocellular Carcinoma: Yes - within Joshua criteria  6. Ascites: Present - minimal  7. Costal Angle: wide  8. Portopulmonary Hypertension: absent  9. Hepatopulmonary Syndrome: absent  10. Cardiac Evaluation: needs stress echocardiogram  11. Nutritional Status: Good  12. Diabetes: no  13.Hypertension  yes  14. Smoker: no  14: Fraility index: no  15. Meets guidelines to receive Living Donor  Yes -  ....  16. Potential Living donors No  MELD-Na score: 8 at 2/14/2023  8:24 AM  MELD score: 8 at 2/14/2023  8:24 AM  Calculated from:  Serum Creatinine: 0.68 mg/dL (Using min of 1 mg/dL) at 2/14/2023  8:24 AM  Serum Sodium: 138 mmol/L (Using max of 137 mmol/L) at 2/14/2023  8:24 AM  Total Bilirubin: 1.7 mg/dL at 2/14/2023  8:24 AM  INR(ratio): 1.01 at 2/14/2023  8:24 AM  Age: 57 years  Recommendations:   Patient needs to complete work-up, complete the radioembolization no surgical contraindications.      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 No evidence for cirrhosis.  No acute intraabdominal/pelvic abnormality.  No evidence for appendicitis, diverticulitis, urinary or intestinal  obstruction .  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    Forwarded to Dr. Goldberg's staff.      a final recommendation on the patients suitability for transplant to be made at that time.    Consult Full  Details:  Abiola Matute was seen in consultation at the request of Dr. Jeannette Cervantes for evaluation as a potential liver transplant recipient.    Reason for Visit:  Abiola Matute is a 57 year old year old female with Laennec's, who presents for liver transplant evaluation.    HPI:  Presenting complaint: Liver cancer    Patient was diagnosed to have cirrhosis of the liver approximately 3 years ago.  She also has Crohn's disease.  Crohn's disease is being followed by Dr. Mary TRAVIS.  She recently was seen in the emergency room for cellulitis of the foot.  At that time he CT scan showed she had a liver lesion.  The liver lesion was confirmed to be a hepatocellular carcinoma size 3 cm.  She is scheduled to have a radioembolization of this lesion.    She does not give any history of variceal bleeding or ascites or encephalopathy.  Her Crohn's disease is fairly under good control.    3 years ago she had acute cholecystitis and had a cholecystostomy tube as she was deemed high risk for a cholecystectomy at that time.    Present was her  who is very supportive.    Past Medical History:   Diagnosis Date     Alcohol abuse      Alcoholic cirrhosis of liver with ascites      Anal fistula      Atypical ductal hyperplasia of breast 09/10/2010    ERT not recommended -left - and flat epithelial atypia-scheduled for breast biopsy 9/17/2010      Bifid uvula      Cholelithiasis      Contact perianal dermatitis and other eczema     recurrent - clobetasol      Crohn disease      Fear of flying      - gets Ativan prn.      HCC (hepatocellular carcinoma) (H) 2/1/2023     Hypertension      IBS (irritable bowel syndrome)      Past Surgical History:   Procedure Laterality Date     BIOPSY ANAL N/A 3/28/2019    anal biopsy and culure placement of seton - Dr Fleming     BIOPSY BREAST Left 09/17/2010    - scheduled with   Kojo      BIOPSY LIVER  2019     COLONOSCOPY  2006     COLONOSCOPY N/A 12/23/2014    Procedure: COMBINED COLONOSCOPY, SINGLE OR MULTIPLE BIOPSY/POLYPECTOMY BY BIOPSY;  Surgeon: Diane Fleming MD;  Location:  GI     COLONOSCOPY N/A 12/5/2019    Procedure: COLONOSCOPY, WITH POLYPECTOMY AND BIOPSY;  Surgeon: Farhan Schilling MD;  Location: [facility] GI     ENDOSCOPIC RETROGRADE CHOLANGIOPANCREATOGRAM N/A 4/24/2020    Procedure: ENDOSCOPIC RETROGRADE CHOLANGIOPANCREATOGRAPHY WITH, sledge removal,sphincterotomy, stent in gallbladder and pancreatic duct stent, and balloon dilation;  Surgeon: Guru Isac Kraft MD;  Location: [facility] OR     ESOPHAGOSCOPY, GASTROSCOPY, DUODENOSCOPY (EGD), COMBINED N/A 12/5/2019    Procedure: ESOPHAGOGASTRODUODENOSCOPY (EGD);  Surgeon: Farahn Schilling MD;  Location: [facility] GI     EXAM UNDER ANESTHESIA ANUS N/A 3/28/2019    Procedure: EXAM UNDER ANESTHESIA ANUS;  Surgeon: Diane Fleming MD;  Location:  OR     EXAM UNDER ANESTHESIA ANUS N/A 2/26/2021    Procedure: EXAM UNDER ANESTHESIA OF ANUS, SETON PLACEMENT, EXCISION OF SKIN BRIDGE;  Surgeon: Avtar Nam MD;  Location: [facility]     EXAM UNDER ANESTHESIA ANUS N/A 1/6/2023    Procedure: EXAM UNDER ANESTHESIA, ANUS, SETON EXCHANGE, partial fistulotomy;  Surgeon: Mary Dugan MD;  Location: [facility] OR     HYSTERECTOMY, VAGINAL  2006    with Dr. Licha Zhou - with BSO for fibroids      IR GALLBLADDER DRAIN PLACEMENT  4/22/2020     OPEN REDUCTION INTERNAL FIXATION ANKLE Left at age 27    plates and screws removed at age 36     Pelviscopy with removal of bilateral hydrosalpinges.  04/15/2010     ZZC APPENDECTOMY  at age 14     Past Surgical History:   Procedure Laterality Date     BIOPSY ANAL N/A 3/28/2019    anal biopsy and culure placement of seton - Dr Fleming     BIOPSY BREAST Left 09/17/2010    - scheduled with Dr. Varma      BIOPSY LIVER  2019     COLONOSCOPY  2006     COLONOSCOPY N/A  12/23/2014    Procedure: COMBINED COLONOSCOPY, SINGLE OR MULTIPLE BIOPSY/POLYPECTOMY BY BIOPSY;  Surgeon: Diane Fleming MD;  Location:  GI     COLONOSCOPY N/A 12/5/2019    Procedure: COLONOSCOPY, WITH POLYPECTOMY AND BIOPSY;  Surgeon: Farhan Schilling MD;  Location: [facility]     ENDOSCOPIC RETROGRADE CHOLANGIOPANCREATOGRAM N/A 4/24/2020    Procedure: ENDOSCOPIC RETROGRADE CHOLANGIOPANCREATOGRAPHY WITH, sledge removal,sphincterotomy, stent in gallbladder and pancreatic duct stent, and balloon dilation;  Surgeon: Guru Isac Kraft MD;  Location: [facility] OR     ESOPHAGOSCOPY, GASTROSCOPY, DUODENOSCOPY (EGD), COMBINED N/A 12/5/2019    Procedure: ESOPHAGOGASTRODUODENOSCOPY (EGD);  Surgeon: Farhan Schilling MD;  Location: [facility]     EXAM UNDER ANESTHESIA ANUS N/A 3/28/2019    Procedure: EXAM UNDER ANESTHESIA ANUS;  Surgeon: Diane Fleming MD;  Location:  OR     EXAM UNDER ANESTHESIA ANUS N/A 2/26/2021    Procedure: EXAM UNDER ANESTHESIA OF ANUS, SETON PLACEMENT, EXCISION OF SKIN BRIDGE;  Surgeon: Avtar Nam MD;  [place] [facility] [place]     EXAM UNDER ANESTHESIA ANUS N/A 1/6/2023    Procedure: EXAM UNDER ANESTHESIA, ANUS, SETON EXCHANGE, partial fistulotomy;  Surgeon: Mary Dugan MD;  [place] [facility] [place]     HYSTERECTOMY, VAGINAL  2006    with Dr. Licha Zhou - with BSO for fibroids      IR GALLBLADDER DRAIN PLACEMENT  4/22/2020     OPEN REDUCTION INTERNAL FIXATION ANKLE Left at age 27    plates and screws removed at age 36     Pelviscopy with removal of bilateral hydrosalpinges.  04/15/2010     ZZC APPENDECTOMY  at age 14     Family History   Problem Relation Age of Onset     Breast Cancer Mother      Gastrointestinal Disease Mother      Heart Disease Father 57     Cancer Sister      Skin Cancer Sister      Hypertension Maternal Grandmother      Diabetes Maternal Grandfather      Diabetes Paternal Grandmother      Heart Disease Paternal Grandfather      Anesthesia Reaction  No family hx of      Thrombosis No family hx of      Allergies   Allergen Reactions     Fish Oil      Redness and itching around eye area only - went away when fish oil capsules stopped      Metronidazole      pain/itching     Naphthalenemethylamines      Lamisil = mild urticarial reaction     Ppd [Tuberculin Purified Protein Derivative]      Sulfa Drugs      hives     Prior to Admission medications    Medication Sig Start Date End Date Taking? Authorizing Provider   cephALEXin (KEFLEX) 500 MG capsule Take 1 capsule (500 mg) by mouth 4 times daily 1/1/23   Yolande Godwin MD   cephALEXin (KEFLEX) 500 MG capsule Take 1 capsule (500 mg) by mouth 4 times daily 1/1/23   Yolande Godwin MD   furosemide (LASIX) 40 MG tablet TAKE 1 TABLET BY MOUTH  DAILY  Patient taking differently: Take 40 mg by mouth every morning 6/1/22   Jeannette Cervantes MD   inFLIXimab (REMICADE) 100 MG injection Inject into the vein once Next dose 12/16/22 2/23/21   Linda Macdonald APRN CNP   Milk Thistle-Dand-Fennel-Licor (MILK THISTLE XTRA) CAPS capsule Take 1 capsule by mouth 2 times daily    Unknown, Entered By History   Multiple Vitamins-Minerals (MULTIVITAMIN & MINERAL PO) Take by mouth every morning    Reported, Patient   spironolactone (ALDACTONE) 100 MG tablet TAKE 1 TABLET BY MOUTH  DAILY  Patient taking differently: Take 100 mg by mouth every morning 6/1/22   Jeannette Cervantes MD   triamcinolone (KENALOG) 0.1 % external cream Apply to AA BID x 1-2 week then PRN only 6/13/22   Susanna Palencia PA-C       Previous Transplant Hx: No    Cardiovascular Hx:       h/o Cardiac Issues: No       Exercise Tolerance: no chest pain or shortness of breath with exertion.    Potential Donor(s): No    ROS:    REVIEW OF SYSTEMS (check box if normal)  [x]                GENERAL  [x]                  PULMONARY [x]                 GENITOURINARY  [x]                 CNS                 [x]                  " CARDIAC  [x]                  ENDOCRINE  [x]                 EARS,NOSE,THROAT [x]                  GASTROINTESTINAL [x]                  NEUROLOGIC    [x]                 MUSCLOSKELTAL  [x]                   HEMATOLOGY    Examination:     Vitals:  BP (!) 141/86   Pulse 88   Ht 1.664 m (5' 5.5\")   Wt 80.3 kg (177 lb)   LMP 05/31/2006   SpO2 97%   BMI 29.01 kg/m      GENERAL APPEARANCE: alert and no distress  EYES: PERRL  HENT: mouth without ulcers or lesions  NECK: supple, no adenopathy  RESP: lungs clear to auscultation - no rales, rhonchi or wheezes  CV: regular rhythm, normal rate, no rub   ABDOMEN:  soft, nontender, no HSM or masses and bowel sounds normal  MS: extremities normal- no gross deformities noted, no evidence of inflammation in joints, no muscle tenderness  SKIN: no rash  NEURO: Normal strength and tone, sensory exam grossly normal, mentation intact and speech normal  PSYCH: mentation appears normal. and affect normal/bright      Results:   Recent Results (from the past 168 hour(s))   Lipid Profile    Collection Time: 02/14/23  8:24 AM   Result Value Ref Range    Cholesterol 149 <200 mg/dL    Triglycerides 78 <150 mg/dL    Direct Measure HDL 53 >=50 mg/dL    LDL Cholesterol Calculated 80 <=100 mg/dL    Non HDL Cholesterol 96 <130 mg/dL   Basic metabolic panel    Collection Time: 02/14/23  8:24 AM   Result Value Ref Range    Sodium 138 136 - 145 mmol/L    Potassium 3.7 3.4 - 5.3 mmol/L    Chloride 102 98 - 107 mmol/L    Carbon Dioxide (CO2) 26 22 - 29 mmol/L    Anion Gap 10 7 - 15 mmol/L    Urea Nitrogen 10.5 6.0 - 20.0 mg/dL    Creatinine 0.68 0.51 - 0.95 mg/dL    Calcium 9.6 8.6 - 10.0 mg/dL    Glucose 100 (H) 70 - 99 mg/dL    GFR Estimate >90 >60 mL/min/1.73m2   Hepatic panel    Collection Time: 02/14/23  8:24 AM   Result Value Ref Range    Protein Total 8.6 (H) 6.4 - 8.3 g/dL    Albumin 3.9 3.5 - 5.2 g/dL    Bilirubin Total 1.7 (H) <=1.2 mg/dL    Alkaline Phosphatase 113 (H) 35 - 104 U/L    " AST 85 (H) 10 - 35 U/L    ALT 44 (H) 10 - 35 U/L    Bilirubin Direct 0.56 (H) 0.00 - 0.30 mg/dL   HCG qualitative (female only)    Collection Time: 02/14/23  8:24 AM   Result Value Ref Range    hCG Serum Qualitative Negative Negative   Iron and iron binding capacity    Collection Time: 02/14/23  8:24 AM   Result Value Ref Range    Iron 165 (H) 37 - 145 ug/dL    Iron Binding Capacity 273 240 - 430 ug/dL    Iron Sat Index 60 (H) 15 - 46 %   Phosphorus    Collection Time: 02/14/23  8:24 AM   Result Value Ref Range    Phosphorus 4.3 2.5 - 4.5 mg/dL   TSH with free T4 reflex    Collection Time: 02/14/23  8:24 AM   Result Value Ref Range    TSH 2.63 0.30 - 4.20 uIU/mL   INR    Collection Time: 02/14/23  8:24 AM   Result Value Ref Range    INR 1.01 0.85 - 1.15   CBC with platelets    Collection Time: 02/14/23  8:24 AM   Result Value Ref Range    WBC Count 3.7 (L) 4.0 - 11.0 10e3/uL    RBC Count 4.36 3.80 - 5.20 10e6/uL    Hemoglobin 15.2 11.7 - 15.7 g/dL    Hematocrit 45.4 35.0 - 47.0 %     (H) 78 - 100 fL    MCH 34.9 (H) 26.5 - 33.0 pg    MCHC 33.5 31.5 - 36.5 g/dL    RDW 13.2 10.0 - 15.0 %    Platelet Count 127 (L) 150 - 450 10e3/uL   UA reflex to Microscopic and Culture    Collection Time: 02/14/23  8:33 AM    Specimen: Urine, NOS   Result Value Ref Range    Color Urine Straw Colorless, Straw, Light Yellow, Yellow    Appearance Urine Clear Clear    Glucose Urine Negative Negative mg/dL    Bilirubin Urine Negative Negative    Ketones Urine Negative Negative mg/dL    Specific Gravity Urine 1.005 1.003 - 1.035    Blood Urine Negative Negative    pH Urine 7.0 5.0 - 7.0    Protein Albumin Urine Negative Negative mg/dL    Urobilinogen Urine Normal Normal, 2.0 mg/dL    Nitrite Urine Negative Negative    Leukocyte Esterase Urine Negative Negative     I had a long discussion with the patient regarding liver transplantation which included but was not limited to  the following points:    1. Liver transplant selection  committee process.  2. The federal rules for cadaveric waiting list, the size and blood type matching of the organ. The availability of living-related donor transplantation.  3. The types of donors: brain death donors, non-heart beating donors, partial liver grafts: splits and living donor grafts  4. Extended criteria  Donors (older age, steasosis) and the increased  risk of primary non-function using the extended criteria donors  5. The [facility] high risk donors,  Risk of donor transmitted infections and donor transmitted malignancy  6. The liver transplant operation and the associated risks and technical complications which can include intraoperative death, post operative death,  Primary non-function, bleeding requiring re-operations, arterial and biliary complications, bowel perforations, and intra abdominal abscess. Some of these complicaitons may require a second operation.  7. The postoperative course, the ICU stay and risk of postoperative complications which can include sepsis, MI, stroke, brain injury, pneumonia, pleural effusions, and renal dysfunction.  8. The current 1 year and 5 year graft and patient survivals.  9. The need for life long immunosuppressive therapy and the side effects of these medications, including the possibility of toxicity, opportunistic infections, risk of cancer including lymphoma, and the possibility of rejection even if the patient is taking the medication exactly as prescribed.  10. The need for compliance with medications and follow-up visits in the clinic and thereafter.  11. The patient and family understand these risks and wish to proceed to transplantation     Review of prior external note(s) from - Notes from other providers within the healthcare system.  60 minutes spent on the date of the encounter doing chart review, history and exam, documentation and further activities per the note        Again, thank you for allowing me to participate in the care of your patient.         Sincerely,        Pravin Ball MD

## 2023-02-14 NOTE — LETTER
2/14/2023         RE: Abiola Matute  [address] [id number]        Dear Colleague,    Thank you for referring your patient, Abiola Matute, to the [facility] [place]. Please see a copy of my visit note below.    [facility]  LIVER CLINIC FOLLOW UP    A/P  Ms. Matute is a 57 Y F with DUNN and alcohol related cirrhosis and new dx HCC. She is undergoing LT evaluation. Last alcohol reported was 8 months ago. MELD 9 ABO A    HCC 3.2 cm seg 7. . She has been discussed at tumor conference and has seen IR and oncology. Staging is underway. Plan is radioembolization.     Cholecystitis Cystic duct stent placed in 2020. Follow up imaging showed expected migration. No issues since then.  Variceal screening  No varices on EGD 2019. Due. Ordered  Thrombocytopenia 2/2 ETOH and cirrhosis. Plt around 100.    Ascites Controlled with diuretics. Discussed this and low Na. Beau 100 mg and furosemide 40. Renewed.  AUD positive PETH 1/20/23. Low positive. States last alcohol was 8 mo ago. Discussed that absolute abstinence is required.  Bone health DEXA showed osteopenia. Taking ca and D. DEXA Due 2022  Crohns With chronic anorecal fistula and currently with [facility]. Sees Dr Schilling and Leonel Wilkinson (1/6/23). She is on infliximab. Have communicated with Dr. Schilling regarding her current diagnoses to ask if it would impact timing of treatments and scoping. He will review her chart and get back to me.    LE cellulitis due to skin cracks in feet. Have asked her to see podiatry for skin care recommendations.    LT Candidacy She is in eval. We discussed MELD, MELD exception.     RTC 3-4 m  Jeannette Cervantes MD  Hepatology/Liver Transplant  Medical Director, Liver Transplantation  [facility]  Subjective  Ms. Matute is a 57 Y F with alcohol related cirrhosis and new dx HCC.     Diagnosis of liver disease was made in 2019 when she was having colon  evaluations with a diagnosis of perianal Crohn's and her elevated liver tests were addressed.    Admitted [facility] 4/21/20 for acute on chronic cholecystitis, underwent ERCP with cystic stent, pancreatic stent. AXR 5/20/20 showed cystic duct stent with migration.     She was in ED 12/31/22 for leg pain and chest pain. Diagnosis was cellulitis. As part of the workup she had CT chest and incidental lesion in liver was seen.     MRI 1/19/23   There is approximately 3.2 x 2.9 cm arterial enhancing lesion in  hepatic segment 7 along the right hemidiaphragm, with evidence of  washout and pseudocapsule, consistent with OPTN/LIRADS class 5B  Lesion      Liver biopsy 4/24/19 read by Dr. Ger Montoya  1. Steatohepatitis     a. Moderate steatosis (35%, mixed macro and microvesicular, non-zonal)     b. Mild necroinflammatory activity (grade 1 of 3)     c. Well-developed micronodular cirrhosis (stage 4 of 4)  2. Negative for autoimmune hepatitis or other intercurrent liver disease   3. See comment    A) Needle biopsy of liver provides an adequate sample, 1.8 cm in aggregate length, with approximately nine portal areas present. Bile ducts are intact. Well-developed micronodular cirrhosis is associated with overt steatohepatitis. Prominent Linh's hyaline is present, somewhat suggestive of alcoholic etiology. There is no significant lymphoplasma cellular infiltrate and no interface hepatitis to support diagnosis of autoimmune hepatitis. Trichrome stain confirms micronodular cirrhosis. An iron stain is negative.    Reviewed with Dr. Cardona.   53-year-old woman presents with abnormal liver tests and cross sectional CT suggestive of cirrhosis. The patient has been diagnosed with portal hypertension and has anal changes clinically suggestive of Crohn's disease. Serum chemistries include AST 99, ALT 65, alkaline phosphatase 151, total bilirubin 2.4, mildly elevated IgG and IgM and elevated F-actin at 28. Antinuclear antibody is  negative.     AUD  Reports last alcohol as 8 mo ago. Has an NA beer on occasion.  Alcohol pattern had been 3-4 times per week, 2-6 beers depending on activities.   No CD treatment, no DWI.  PETH 1/20/23 was 59    Ascites  Para 10/9/19 3700 ml  This has resolved on kathleen 100 and furosemide 40    Crohns  She was started on infliximab 2019. She has perianal disease which Dr. Schilling describes and inactive at the time of his last note 10/4/22. The patient states it is active and she has a Seton.    Risk factors for fatty liver: was obese in the past, typically 200 pounds. No DM. HTN      Liver Function Studies - Recent Labs   Lab Test 01/20/23  0906   PROTTOTAL 8.4*   ALBUMIN 3.8   BILITOTAL 1.5*   ALKPHOS 116*   AST 89*   ALT 58*     CBC RESULTS: Recent Labs   Lab Test 01/20/23  0906   WBC 4.9   RBC 4.19   HGB 15.0   HCT 45.1   *   MCH 35.8*   MCHC 33.3   RDW 13.7             Lab Test 12/16/21  1038   PROTTOTAL 7.7   ALBUMIN 3.1*   BILITOTAL 1.3   ALKPHOS 111   AST 57*   ALT 49     Lab Test 12/16/21  1038   WBC 3.6*   RBC 4.04   HGB 14.1   HCT 41.5   *   MCH 34.9*   MCHC 34.0   RDW 13.1   PLT 98*       MELD-Na score: 9 at 1/20/2023  9:06 AM  MELD score: 9 at 1/20/2023  9:06 AM  Calculated from:  Serum Creatinine: 0.71 mg/dL (Using min of 1 mg/dL) at 1/20/2023  9:06 AM  Serum Sodium: 135 mmol/L at 1/20/2023  9:06 AM  Total Bilirubin: 1.5 mg/dL at 1/20/2023  9:06 AM  INR(ratio): 1.08 at 1/20/2023  9:06 AM  Age: 57 years    HCV neg  HBV neg  JILL neg  Factin 28  AMA neg  Iron panel sat 59, ferritin 349  No HFE testing  Liver biopsy c/w alcoholic liver disease    Past Medical History  Past Medical History:   Diagnosis Date     Alcohol abuse      Alcoholic cirrhosis of liver with ascites      Anal fistula      Atypical ductal hyperplasia of breast 09/10/2010    ERT not recommended -left - and flat epithelial atypia-scheduled for breast biopsy 9/17/2010      Bifid uvula      Cholelithiasis      Contact  perianal dermatitis and other eczema     recurrent - clobetasol      Crohn disease      Fear of flying      - gets Ativan prn.      HCC (hepatocellular carcinoma) (H) 2/1/2023     Hypertension      IBS (irritable bowel syndrome)      Social History  Social History     Socioeconomic History     Marital status:      Spouse name: Albino     Number of children: 3     Years of education: 14     Highest education level: Not on file   Occupational History     Occupation: unemployed   Tobacco Use     Smoking status: Never     Smokeless tobacco: Never   Vaping Use     Vaping Use: Never used   Substance and Sexual Activity     Alcohol use: Not Currently     Comment: approx. 6 months ago     Drug use: No     Comment: no herbal meds either     Sexual activity: Yes     Partners: Male     Birth control/protection: Post-menopausal     Comment: husb had vasectomy   Other Topics Concern      Service No     Blood Transfusions No     Caffeine Concern No     Comment: rarely drinks caffeine     Occupational Exposure Not Asked     Hobby Hazards Not Asked     Sleep Concern Not Asked     Stress Concern Not Asked     Weight Concern Not Asked     Special Diet Not Asked     Back Care Not Asked     Exercise Yes     Comment: does a lot of walking - 4x/week     Bike Helmet Not Asked     Seat Belt Yes     Comment: always     Self-Exams Yes     Comment: SBE encouraged monthly     Parent/sibling w/ CABG, MI or angioplasty before 65F 55M? Yes   Social History Narrative    calcium - drinks 5-6 large glasses skim milk/day    flex sig/colonoscopy -at age 49    sun precautions - discussed    mammogram - needs every 2 years in her 30's, then yearly from then on    Td booster - 9/99 and 4/27/2010    pneumovax -at age 59    DEXA -when perimenopausal    stool hemoccults - every year after age 40    ASA- start at age 40    mulvitamin - encouraged     Social Determinants of Health     Financial Resource Strain: Not on file   Food Insecurity: Not  on file   Transportation Needs: Not on file   Physical Activity: Not on file   Stress: Not on file   Social Connections: Not on file   Intimate Partner Violence: Not on file   Housing Stability: Not on file   Not currently working since 2019. Got laid off and hasn't returned due to health.     Family History  Family History   Problem Relation Age of Onset     Breast Cancer Mother      Gastrointestinal Disease Mother      Heart Disease Father 57     Cancer Sister      Skin Cancer Sister      Hypertension Maternal Grandmother      Diabetes Maternal Grandfather      Diabetes Paternal Grandmother      Heart Disease Paternal Grandfather      Anesthesia Reaction No family hx of      Thrombosis No family hx of    F  from heart disease    ROS 10 point ROS neg other than the symptoms noted above in the HPI.  Exam  Gen Alert pleasant NAD  Resp No difficulty breathing. No cough  Skin No Jaundice  Eyes No icterus  Neuro HERRERA  MSK no muscle wasting  Psyche Pleasant, appropriate. Well groomed.    Time today in minutes 105  Record review 20  Communication with care team 25  Face to face with patient 40  Documentation 20      Again, thank you for allowing me to participate in the care of your patient.        Sincerely,        Jeannette Cervantes MD     - - -

## 2023-02-15 ENCOUNTER — TELEPHONE (OUTPATIENT)
Dept: BEHAVIORAL HEALTH | Facility: CLINIC | Age: 58
End: 2023-02-15

## 2023-02-15 ENCOUNTER — TELEPHONE (OUTPATIENT)
Dept: TRANSPLANT | Facility: CLINIC | Age: 58
End: 2023-02-15
Payer: MEDICARE

## 2023-02-15 LAB
AMPHETAMINES SERPL QL SCN: NEGATIVE NG/ML
ANNOTATION COMMENT IMP: NORMAL
ATRIAL RATE - MUSE: 69 BPM
BARBITURATES SERPL QL SCN: NEGATIVE NG/ML
BENZODIAZ SERPL QL SCN: NEGATIVE NG/ML
BUPRENORPHINE SERPL-MCNC: NEGATIVE NG/ML
CANNABINOIDS SERPL QL SCN: POSITIVE NG/ML
CMV IGG SERPL IA-ACNC: <0.2 U/ML
CMV IGG SERPL IA-ACNC: NORMAL
COCAINE SERPL QL SCN: NEGATIVE NG/ML
DIASTOLIC BLOOD PRESSURE - MUSE: NORMAL MMHG
EBV VCA IGG SER IA-ACNC: 188 U/ML
EBV VCA IGG SER IA-ACNC: POSITIVE
ETHYL GLUCURONIDE UR QL SCN: NEGATIVE NG/ML
GAMMA INTERFERON BACKGROUND BLD IA-ACNC: 0.03 IU/ML
INTERPRETATION ECG - MUSE: NORMAL
M TB IFN-G BLD-IMP: NEGATIVE
M TB IFN-G CD4+ BCKGRND COR BLD-ACNC: 9.97 IU/ML
METHADONE SERPL QL SCN: NEGATIVE NG/ML
METHAMPHET SERPL QL: NEGATIVE NG/ML
MITOGEN IGNF BCKGRD COR BLD-ACNC: -0.01 IU/ML
MITOGEN IGNF BCKGRD COR BLD-ACNC: -0.01 IU/ML
OPIATES SERPL QL SCN: NEGATIVE NG/ML
OXYCODONE SERPL QL: NEGATIVE NG/ML
P AXIS - MUSE: 47 DEGREES
PCP SERPL QL SCN: NEGATIVE NG/ML
PR INTERVAL - MUSE: 164 MS
QRS DURATION - MUSE: 86 MS
QT - MUSE: 434 MS
QTC - MUSE: 465 MS
QUANTIFERON MITOGEN: 10 IU/ML
QUANTIFERON NIL TUBE: 0.03 IU/ML
QUANTIFERON TB1 TUBE: 0.02 IU/ML
QUANTIFERON TB2 TUBE: 0.02
R AXIS - MUSE: 6 DEGREES
SYSTOLIC BLOOD PRESSURE - MUSE: NORMAL MMHG
T AXIS - MUSE: 28 DEGREES
VENTRICULAR RATE- MUSE: 69 BPM

## 2023-02-15 NOTE — TELEPHONE ENCOUNTER
Transplant Social Work Services Phone Call    Data: Virginia is in evaluation for liver transplant.   Intervention: I called Virginia to re discuss RENEE assessment. I left a VM requesting a call back. Virginia is agreeable to schedule an RENEE assessment and any recommendations. I provided the number to schedule.   I reviewed her PEth. She reported that did not even think about it during our visit, however she did have celebratory drink during her daughters wedding on New years. She reported a small amount of alcohol during the event. Her spouse is also aware of this consumption. I voiced application for her honesty ad reminded her that we ask her to have complete abstinence.     Assessment: Virginia is recommended to complete a substance use assessment and any treatment recommended. She is agreeable  Education provided by : Complete abstinence from alcohol.   Plan: This writer will follow for transplant evaluation.     RODGER Newby, [facility]  Liver Transplant   [facility]  Phone: [phone]  Pager: [phone]

## 2023-02-16 NOTE — TELEPHONE ENCOUNTER
Pt is a(n) adult (18+ out of HS) Seeking as eval for Adult RENEE Assessment (no programming). and is interested in Adult Evaluation only - no specific program requested.  Appointment scheduled by:  Patient.  (If patient is self-pay, we much complete a Cost Estimate and save it to the pt chart)  Caller name:  [place]ia    Caller phone #: [phone]  If in person, please state reason why:    Brief reason for appt:  Liver transplant    Cost estimate Did not get completed.  Contact information verified/updated: Yes

## 2023-02-17 ENCOUNTER — TELEPHONE (OUTPATIENT)
Dept: BEHAVIORAL HEALTH | Facility: CLINIC | Age: 58
End: 2023-02-17
Payer: MEDICARE

## 2023-02-17 ENCOUNTER — HOSPITAL ENCOUNTER (OUTPATIENT)
Dept: BEHAVIORAL HEALTH | Facility: CLINIC | Age: 58
Discharge: HOME | End: 2023-02-17
Attending: FAMILY MEDICINE | Admitting: FAMILY MEDICINE
Payer: COMMERCIAL

## 2023-02-17 VITALS — HEIGHT: 66 IN | WEIGHT: 178 LBS | BODY MASS INDEX: 28.61 KG/M2

## 2023-02-17 DIAGNOSIS — F10.20 ALCOHOL USE DISORDER, MODERATE, DEPENDENCE (H): ICD-10-CM

## 2023-02-17 LAB
PLPETH BLD-MCNC: <10 NG/ML
POPETH BLD-MCNC: <10 NG/ML

## 2023-02-17 PROCEDURE — H0001 ALCOHOL AND/OR DRUG ASSESS: HCPCS | Mod: GT,95

## 2023-02-17 ASSESSMENT — COLUMBIA-SUICIDE SEVERITY RATING SCALE - C-SSRS
3. HAVE YOU BEEN THINKING ABOUT HOW YOU MIGHT KILL YOURSELF?: NO
1. IN THE PAST MONTH, HAVE YOU WISHED YOU WERE DEAD OR WISHED YOU COULD GO TO SLEEP AND NOT WAKE UP?: NO
2. HAVE YOU ACTUALLY HAD ANY THOUGHTS OF KILLING YOURSELF IN THE PAST MONTH?: NO
4. HAVE YOU HAD THESE THOUGHTS AND HAD SOME INTENTION OF ACTING ON THEM?: NO
6. HAVE YOU EVER DONE ANYTHING, STARTED TO DO ANYTHING, OR PREPARED TO DO ANYTHING TO END YOUR LIFE?: NO
5. HAVE YOU STARTED TO WORK OUT OR WORKED OUT THE DETAILS OF HOW TO KILL YOURSELF? DO YOU INTEND TO CARRY OUT THIS PLAN?: NO

## 2023-02-17 ASSESSMENT — PAIN SCALES - GENERAL: PAINLEVEL: NO PAIN (0)

## 2023-02-17 NOTE — PATIENT INSTRUCTIONS
RENEE Recommendations:    Abstain from all mood-altering chemicals unless prescribed by a licensed provider.   Complete an Outpatient CD Treatment Program, such as those offered by [facility].  Follow all recommendations of your treatment and medical team.  Take all medications as prescribed.  Should you struggle with cravings, meet with your Primary Care Physician and discuss anti-craving medication.  You are strongly encouraged to attend one weekly sober support group meeting, such as 12 step based (AA/NA), SM[facility] Recovery, [facility], [facility], RO, [place] [facility] meetings.     Provide ongoing PEth/ETG testing per transplant team guidelines.

## 2023-02-17 NOTE — PROGRESS NOTES
[facility]      PATIENT'S NAME: Abiola Mcnamara  PREFERRED NAME: [place]  PRONOUNS: she/her/hers     MRN: [id number]  : 1965  ADDRESS: [address] [place] [id number]  ACCT. NUMBER:  [id number]  DATE OF SERVICE: 23  START TIME: 10:30 am  END TIME: 12:00 pm  PREFERRED PHONE: [phone]  May we leave a program related message: Yes  EMERGENCY CONTACT:  Name Home Phone Work Phone Mobile Phone Relationship Lgl Grd   JOON MCNAMARA [phone] Spouse    BRIANNA MCNAMARA   [phone] Daughter      SERVICE MODALITY:  Video Visit:      Provider verified identity through the following two step process.  Patient provided:  Patient  and Patient's last 4 digits of SSN    Telemedicine Visit: The patient's condition can be safely assessed and treated via synchronous audio and visual telemedicine encounter.      Reason for Telemedicine Visit: Patient has requested telehealth visit    Originating Site (Patient Location): Patient's home    Distant Site (Provider Location): Provider Remote Setting- Home Office    Consent:  The patient/guardian has verbally consented to: the potential risks and benefits of telemedicine (video visit) versus in person care; bill my insurance or make self-payment for services provided; and responsibility for payment of non-covered services.     Patient would like the video invitation sent by:  My Chart    Mode of Communication:  Video Conference via Amwell    Distant Location (Provider):  Off-site    As the provider I attest to compliance with applicable laws and regulations related to telemedicine.    UNIVERSAL ADULT Substance Use Disorder DIAGNOSTIC ASSESSMENT    Identifying Information:  Patient is a 57 year old,  individual.  Patient was referred for an assessment by referring provider.  Patient attended the session alone.    Chief Complaint:   The reason for seeking services at this time is:  "\"Substance/chemical assessment & mental health support.\"  The problem(s) began 12/22/22.  Patient has not attempted to resolve these concerns in the past.  Patient does not appear to be in severe withdrawal, an imminent safety risk to self or others, or requiring immediate medical attention and may proceed with the assessment interview.    Social/Family History:  Patient reported they grew up in [place], [place].  They were raised by biological parents.  Parents were always together. Patient had 3 full siblings and patient was the 3rd child. Patient reported that their childhood was \"humbly.  We needed assistance here and there, financially.  I went to a [employer] school.  I felt very safe, my parents have always been together until my dad passed away.\"  Patient described their current relationships with family of origin as \"very good.  My younger sister and my brother I'm closer to them.  The sister I have who lives in [place], is estranged.  We dealt with some things from her  sexually, as far as him exposing himself to myself and my girls.  We have decided that there is no way we're going to let them around him.\"      The patient describes their cultural background as  of Japanese and [place] descent.  Cultural influences and impact on patient's life structure, values, norms, and healthcare: Congregation was present in the home. Chemical dependencywith extended family members.  Contextual influences on patient's health include: Contextual Factors: Individual Factors Substance Use.  Patient identified their preferred language to be English. Patient reported they does not need the assistance of an  or other support involved in therapy.  Patient reports they are involved in community of holden activities.  They reports spirituality impacts recovery in the following ways:  \"There was some changes as far as .  We do belong to a [employer] but it's been a number of years since we've been.\"  Patient " believes her spirituality and sobriety are connected.     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  Patient believes it caused developmental delays.   Patient's highest education level was some college. Patient identified the following learning problems: none reported.  Patient reports they are able to understand written materials.    Patient reported the following relationship history as 1 marriage.  Patient's current relationship status is  for 36 years.   Patient identified their sexual orientation as heterosexual.  Patient reported having 3 children, all adults.  Patient has 2 grandchildren.     Patient's current living/housing situation involves staying in own home/apartment.  The immediate members of family and household include Albino Matute, 62,Spouse and son and they report that housing is stable. Patient identified spouse as part of their support system.  Patient identified the quality of these relationships as good.      Patient reports engaging in the following recreational/leisure activities: camping, cooking, boating, playing board games and card games.  Patient is currently disabled due to her Crohn's Disease.  Patient reports their income is obtained through SSDI disability; spouse.  Patient does not identify finances as a current stressor.      Patient denies substance related arrests or legal issues.   They are not under any current court jurisdiction.     Patient's Strengths and Limitations:  Patient identified the following strengths or resources that will help them succeed in treatment: commitment to health and well being, holden / spirituality, friends / good social support, family support, motivation and work ethic. Things that may interfere with the patient's success in treatment include: physical health concerns.     Assessments:  The following assessments were completed by  patient for this visit:  PHQ2:   PHQ-2 ( 1999 [employer]) 2/16/2023 1/3/2023 11/11/2022 10/4/2022 10/4/2022 9/10/2021 1/25/2021   Q1: Little interest or pleasure in doing things 0 0 0 0 0 0 0   Q2: Feeling down, depressed or hopeless 1 0 0 0 0 0 1   PHQ-2 Score 1 0 0 0 0 0 1   PHQ-2 Total Score (12-17 Years)- Positive if 3 or more points; Administer PHQ-A if positive - - - - - 0 1   Q1: Little interest or pleasure in doing things Not at all Not at all Not at all Not at all - Not at all -   Q2: Feeling down, depressed or hopeless Several days Not at all Not at all Not at all - Not at all -   PHQ-2 Score 1 0 0 0 - 0 -     GAD2:   TAINA-2 2/16/2023   Feeling nervous, anxious, or on edge 1   Not being able to stop or control worrying 1   TAINA-2 Total Score 2     CAGE-AID:   CAGE-AID Total Score 2/16/2023 2/16/2023   Total Score 1 2   Total Score MyChart - 1 (A total score of 2 or greater is considered clinically significant)     PROMIS 10-Global Health (all questions and answers displayed):   PROMIS 10 2/16/2023   In general, would you say your health is: Good   In general, would you say your quality of life is: Good   In general, how would you rate your physical health? Fair   In general, how would you rate your mental health, including your mood and your ability to think? Good   In general, how would you rate your satisfaction with your social activities and relationships? Good   In general, please rate how well you carry out your usual social activities and roles Good   To what extent are you able to carry out your everyday physical activities such as walking, climbing stairs, carrying groceries, or moving a chair? Completely   How often have you been bothered by emotional problems such as feeling anxious, depressed or irritable? Sometimes   How would you rate your fatigue on average? Mild   How would you rate your pain on average?   0 = No Pain  to  10 = Worst Imaginable Pain 3   In general, would you say your health is: 3    In general, would you say your quality of life is: 3   In general, how would you rate your physical health? 2   In general, how would you rate your mental health, including your mood and your ability to think? 3   In general, how would you rate your satisfaction with your social activities and relationships? 3   In general, please rate how well you carry out your usual social activities and roles. (This includes activities at home, at work and in your community, and responsibilities as a parent, child, spouse, employee, friend, etc.) 3   To what extent are you able to carry out your everyday physical activities such as walking, climbing stairs, carrying groceries, or moving a chair? 5   In the past 7 days, how often have you been bothered by emotional problems such as feeling anxious, depressed, or irritable? 3   In the past 7 days, how would you rate your fatigue on average? 2   In the past 7 days, how would you rate your pain on average, where 0 means no pain, and 10 means worst imaginable pain? 3   Global Mental Health Score 12   Global Physical Health Score 15   PROMIS TOTAL - SUBSCORES 27   Some recent data might be hidden     [place] Suicide Severity Rating Scale (Lifetime/Recent)  [place] Suicide Severity Rating (Lifetime/Recent) 3/28/2019 4/3/2019 2/17/2023   Q1 Wished to be Dead (Past Month) no no no   Q2 Suicidal Thoughts (Past Month) no no no   Q3 Suicidal Thought Method - - no   Q4 Suicidal Intent without Specific Plan - - no   Q5 Suicide Intent with Specific Plan - - no   Q6 Suicide Behavior (Lifetime) - no no   Level of Risk per Screen - - low risk     GAIN-sliding scale:  When was the last time that you had significant problems... 2/17/2023   with feeling very trapped, lonely, sad, blue, depressed or hopeless about the future? Past month   with sleep trouble, such as bad dreams, sleeping restlessly, or falling asleep during the day? Past Month   with feeling very anxious, nervous, tense, scared,  panicked or like something bad was going to happen? Past month   with becoming very distressed & upset when something reminded you of the past? Never   with thinking about ending your life or committing suicide? Never      When was the last time that you did the following things 2 or more times? 2/17/2023   Lied or conned to get things you wanted or to avoid having to do something? Past month   Had a hard time paying attention at school, work or home? Never   Had a hard time listening to instructions at school, work or home? Never   Were a bully or threatened other people? Never   Started physical fights with other people? Never       Personal and Family Medical History:  Patient does report a family history of mental health concerns.  Patient reports family history includes Breast Cancer in her mother; Cancer in her sister; Diabetes in her maternal grandfather and paternal grandmother; Gastrointestinal Disease in her mother; Heart Disease in her paternal grandfather; Heart Disease (age of onset: 57) in her father; Hypertension in her maternal grandmother; Skin Cancer in her sister; Substance Abuse in her maternal grandfather, maternal uncle, and maternal uncle; Suicide in her niece and niece.      Patient reported the following previous mental health diagnoses: none reported.  Patient reports their primary mental health symptoms include:  Some anxiety surrounding her diagnosis and these do not impact her ability to function.   Patient received mental health services in the past: therapy.  Psychiatric Hospitalizations: none.  Patient denies a history of civil commitment.  Current mental health services/providers include:  None.    Patient has had a physical exam to rule out medical causes for current symptoms.  Date of last physical exam was within the past year. Symptoms have developed since last physical exam and client was encouraged to follow up with PCP.   The patient has a [facility] Provider, who  is named Linda Macdonald  Patient reports the following current medical concerns: liver cancer, Crohn's Disease and no current dental concerns.  Patient denies any issues with pain.  Patient is double her meds for her Crohn's Disease.  Patient denies pregnancy..  There are not significant appetite / nutritional concerns / weight changes.  Patient does not report a history of an eating disorder.  Patient does not report a history of head injury / trauma / cognitive impairment.      Patient reports current meds as:   Outpatient Medications Marked as Taking for the 2/17/23 encounter (Hospital Encounter) with Lacy Peraza LADC   Medication Sig     furosemide (LASIX) 40 MG tablet TAKE 1 TABLET BY MOUTH  DAILY (Patient taking differently: Take 40 mg by mouth every morning)     inFLIXimab (REMICADE) 100 MG injection Inject into the vein once Next dose 12/16/22     Multiple Vitamins-Minerals (MULTIVITAMIN & MINERAL PO) Take by mouth every morning     spironolactone (ALDACTONE) 100 MG tablet TAKE 1 TABLET BY MOUTH  DAILY (Patient taking differently: Take 100 mg by mouth every morning)     triamcinolone (KENALOG) 0.1 % external cream Apply to AA BID x 1-2 week then PRN only     Medication Adherence:  Patient reports taking prescribed medications as prescribed.   Patient is able to self-administer medications.    Patient Allergies:    Allergies   Allergen Reactions     Fish Oil      Redness and itching around eye area only - went away when fish oil capsules stopped      Metronidazole      pain/itching     Naphthalenemethylamines      Lamisil = mild urticarial reaction     Ppd [Tuberculin Purified Protein Derivative]      Sulfa Drugs      hives     Medical History:    Past Medical History:   Diagnosis Date     Alcohol abuse      Alcoholic cirrhosis of liver with ascites      Anal fistula      Atypical ductal hyperplasia of breast 09/10/2010    ERT not recommended -left - and flat epithelial atypia-scheduled for breast biopsy  "9/17/2010      Bifid uvula      Cholelithiasis      Contact perianal dermatitis and other eczema     recurrent - clobetasol      Crohn disease      Fear of flying      - gets Ativan prn.      HCC (hepatocellular carcinoma) (H) 2/1/2023     Hypertension      IBS (irritable bowel syndrome)        Substance Use:  Patient reported the following biological family members or relatives with chemical health issues:  maternal grandmother, and maternal aunts.  Patient has not received substance use disorder and/or gambling treatment in the past.  Patient has not ever been to detox.  Patient is not currently receiving any chemical dependency treatment. Patient reports no history of support group attendance.      Substance History of use Age of first use Pattern and duration of use (include amounts and frequency) Date of last use     Withdrawal potential Route of administration   Alcohol used in the past   16 LDU:  Patient said she had a vodka soda and 3 seltzers.  Prior to this use, patient hadn't had a drink in months.    Patient said it was the holidays and her daughter was getting  and she didn't think it was a big deal.    Patient said for the past year, she has had abstinence over the past year and every couple of months she would drink 3-4 during a special event. (Birthday, on vacation, out to dinner, etc)    2016:  Pr first dx with fatty liver and was drinking 3-5 times per week, having 3-6 drinks per time (up to 13 on vacations and very rare).  Pt said she slowed down for a while.    2020:  Pt said after her Crohn's dx she drank a few times a week, and 6 months after cirrhosis with ascites dx she obtained abstinence 4 weeks afterward for \"a number of months.\"    Patient said she then started with birthdays and special occasion drinking (current pattern).  Patient didn't think it would it be a big deal. 12/29/22 No oral   Cannabis   used in the past 54 Patient said her kids were giving her gummies and marijuana " "to help her with not drinking.  She said she stopped doing it because she didn't like smoking.    PEth 2/14/23:  <10 ng/mL  PEth 1/20/23: 59 ng/mL 01/01/22 No oral and smoked     Amphetamines never used        Cocaine/crack  never used        Hallucinogens never used          Inhalants never used          Heroin never used          Other Opiates never used        Benzodiazepine never used        Barbiturates never used        Over the counter meds never used        Caffeine currently use 15 Patient said she began drinking 1 cup of coffee per day over the past 4-6 weeks.   2/17/23 No oral   Nicotine  never used        other substances not listed above:  Identify:  never used            Patient reported the following problems as a result of their substance use:no problems, not applicable.  Patient is concerned about substance use. Patient reports family members and her  are concerned about their substance use.  Patient reports their recovery goals are \"abstinence forever.\"    Patient reports experiencing the following withdrawal symptoms within the past 12 months: none and the following within the past 30 days: none.   Patients reports urges to use Alcohol as 3/10 over the past 3 months.  Patient reports she has used more Alcohol than intended or over a longer period of time than intended. Patient reports she has had unsuccessful attempts to cut down or control use of Alcohol.  Patient reports longest period of abstinence was 10-12 months in 2021 and return to use was due to \"thinking it's not that big of a deal, thinking my liver levels were stable.\"  Patient said she began to rationalize her use and minimize the impact on her health.  Patient reports she has not needed to use more Alcohol to achieve the same effect.  Patient does report diminished effect with use of same amount of Alcohol.     Patient does not report a great deal of time is spent in activities necessary to obtain, use, or recover from Alcohol " effects.  Patient does not report important social, occupational, or recreational activities are given up or reduced because of Alcohol use.  Alcohol use is continued despite knowledge of having a persistent or recurrent physical or psychological problem that is likely to have caused or exacerbated by use.  Patient reports the following problem behaviors while under the influence of substances: None.    Patient reports substance use has not ever impacted their ability to function in a school setting. Patient reports substance use has not ever impacted their ability to function in a work setting.  Patients demographics and history impact their recovery in the following ways:  None.  Patient reports engaging in the following recreation/leisure activities while using:  Drinking when out with friends at dinner.  Patient reports the following people are supportive of recovery: , immediate family (kiddos), siblings in [place], [place].     Patient does not have a history of gambling concerns and/or treatment.  Patient does not have other addictive behaviors she is concerned about.      Dimension Scale Ratings:    Dimension 1 -  Acute Intoxication/Withdrawal: 0 - No Problem   No signs or symptoms of intoxication or withdrawal or diminishing signs or symptoms.  Dimension 2 - Biomedical: 2 - Moderate Problem   The patient has difficulty tolerating and coping with physical problems or has other biomedical problems that interfere with recovery and treatment. Physical manifestation of medical issues from substance use.  Dimension 3 - Emotional/Behavioral/Cognitive Conditions: 1 - Minor Problem   The patient has impulse control and coping skills. The patient presents a mild to moderate risk of harm to self or others or displays symptoms of emotional, behavioral, or cognitive problems.   Dimension 4 - Readiness to Change:  1 - Minor Problem   The patient is motivated, with active reinforcement, to explore treatment and strategies  for change but is ambivalent about illness or need for change.   Dimension 5 - Relapse/Continued Use/ Continued Problem Potential: 2 - Moderate Problem   The patient has minimal recognition and understanding of relapse and recidivism issues and displays moderate vulnerability for further substance use or mental health problems.   Dimension 6 - Recovery Environment:  2 - Moderate Problem   The patient is engaged in structured, meaningful activity, but peers, family, significant other and living environment are unsupportive or there is criminal justice involvement by the patient or among the patient s peers, or in the patient s living environment.    Significant Losses / Trauma / Abuse / Neglect Issues:   Patient did not serve in the .  There are indications or report of significant loss, trauma, abuse or neglect issues related to: are no indications and client denies any losses, trauma, abuse, or neglect concerns.  Concerns for possible neglect are not present.     Safety Assessment:   Patient denies current homicidal ideation and behaviors.  Patient denies current self-injurious ideation and behaviors.    Patient denied risk behaviors associated with substance use.  Patient reported substance use associated with mental health symptoms.  Patient reports the following current concerns for their personal safety: None.  Patient reports there are firearms in the house.     yes, they are secured. The firearms are secured in a locked space.    History of Safety Concerns:  Patient denied a history of homicidal ideation.     Patient denied a history of personal safety concerns.    Patient denied a history of assaultive behaviors.    Patient denied a history of sexual assault behaviors.     Patient denied a history of risk behaviors associated with substance use.  Patient reported a history of substance use associated with mental health symptoms.  Patient reports the following protective factors: dedication to family  or friends; safe and stable environment; abstinence from substances; agreement to use safety plan; living with other people; daily obligations; commitment to well being; healthy fear of risky behaviors or pain; sense of personal control or determination    Risk Plan:  See Recommendations for Safety and Risk Management Plan    Review of Symptoms per patient report:   Substance Use:  No symptoms     Diagnostic Criteria:  Substance Use Disorder Substance is often taken in larger amounts or over a longer period than was intended.  Met for:  Alcohol There is persistent desire or unsuccessful efforts to cut down or control use of the substance.  Met for:  Alcohol Use of the substance is continued despite knowledge of having a persistent or recurrent physical or psychological problem that is likely to have been cause or exacerbated by the substance.  Met for:  Alcohol Tolerance:  either a need for markedly increased amounts of the substance to achieve the desired effect or a markedly diminished effect with continued use of the same amount of the substance.  Met for:  Alcohol    Collateral Contact Summary:   Collateral contacts contributing to this assessment:  Patient's EHR was accessed at the time of this assessment.    If court related records were reviewed, summarize here: [facility] was accessed at the time of this assessment.    Information from collateral contacts supported/largely agreed with information from the client and associated risk ratings.    Information in this assessment was obtained from the medical record and provided by patient who is a good historian.    Patient will have open access to their mental health medical record.    Diagnostic Criteria:  1.) Alcohol/drug is often taken in larger amounts or over a longer period than was intended.  Met for Alcohol.  2.) There is a persistent desire or unsuccessful efforts to cut down or control alcohol/drug use.  Met for Alcohol.  4.) Craving, or a strong desire or  urge to use alcohol/drug.  Met for Alcohol.  9.) Alcohol/drug use is continued despite knowledge of having a persistent or recurrent physical or psychological problem that is likely to have been caused or exacerbated by alcohol.  Met for Alcohol.  10.) Tolerance, as defined by either of the following: A markedly diminished effect with continued use of the same amount of alcohol/drug..  Met for Alcohol.      As evidenced by self report and criteria, client meets the following DSM5 Diagnoses:   (Sustained by DSM5 Criteria Listed Above)   Alcohol Use Disorder   303.90 (F10.20) Moderate In a controlled environment.    Recommendations:     1. Plan for Safety and Risk Management:  Recommended that patient call 911 or go to the local ED should there be a change in any of these risk factors.      Report to child / adult protection services was NA.     2. RENEE Recommendations:      Abstain from all mood-altering chemicals unless prescribed by a licensed provider.     Complete an Outpatient CD Treatment Program, such as those offered by [facility].    Follow all recommendations of your treatment and medical team.    Take all medications as prescribed.    Should you struggle with cravings, meet with your Primary Care Physician and discuss anti-craving medication.    You are strongly encouraged to attend one weekly sober support group meeting, such as 12 step based (AA/NA), SMART Recovery, [facility], [facility], RO, [place] [facility] Connection meetings.       Provide ongoing PEth/ETG testing per transplant team guidelines.     Patient reports they are willing to follow these recommendations.  Patient would like the following family or other support people involved in their treatment:  None. Patient does not have a history of opiate use.    3. Mental Health Referrals:  Patient did not appear to be in need of a mental health referral at this time.     4. Clinical Substantiation/medical necessity for the above  recommendations:  Met with patient individually on 2/17/23 for a comprehensive assessment including summary  of assessment and conclusion, assessment risk and appropriate steps taken, documentation to support the diagnosis, rationale for disposition and appropriate level of care recommended and alternative for treatment options.  Patient said she first got a diagnosis of a fatty liver 7 years ago and continued to drink.  Patient said she continued to drink after her Crohn's Disease diagnosis, and after her alcoholic cirrhosis with ascites diagnosis as well.  Patient did maintain sobriety for 1 year during 2021, but returned to drinking.  Patient is unable to identify triggers to relapse or coping skills to prevent relapse in the future.  Patient would benefit from building recovery and coping skill as well as sober supports within a outpatient group.    Rational for recommended level of care: The patient lacks sober coping skills, lacks awareness regarding the disease model of addiction, lacks a sober peer support network and has medical issues which are exacerbated by substance abuse.    5. Patient's identified no cultural concerns related to treatment programming.     6. Recommendations for treatment focus:   Alcohol / Substance Use - See #2 above.     7.  Interactive Complexity: No     DAANES Assessment ID: [id number]    Provider Name/ Credentials:  Lacy Peraza MA, [facility]  February 17, 2023

## 2023-02-17 NOTE — TELEPHONE ENCOUNTER
The below telephone note was routed to the [facility] UR department at: P BEH OUTPATIENT UR [[id number]] and to the [facility] IOP Admissions Department at: P CD ADULT IOP INTAKE POOL [[id number]] on 2/17/2023 as a referral for IOP treatment at [facility].    A.)  Name: Abiola AVA Matute Tel #: [phone]  B.)  MRN: [id number]  C.)  Referral is for: a virtual or hybrid Intensive Outpatient program at one of the [facility] locations for substance use disorder treatment.  D.)  Notes: [facility].    Thanks,    SOLANGE Mejia

## 2023-02-18 LAB — CANNABINOIDS SERPL-MCNC: <5 NG/ML

## 2023-02-18 NOTE — PATIENT INSTRUCTIONS
Virginia is scheduled for a follow up with Dr. Gupta on 03/09/23.    Che Crockett RN on 2/18/2023 at 11:10 AM

## 2023-02-20 ENCOUNTER — TELEPHONE (OUTPATIENT)
Dept: BEHAVIORAL HEALTH | Facility: CLINIC | Age: 58
End: 2023-02-20
Payer: MEDICARE

## 2023-02-21 ENCOUNTER — HOSPITAL ENCOUNTER (OUTPATIENT)
Facility: CLINIC | Age: 58
Discharge: HOME | End: 2023-02-21
Attending: RADIOLOGY | Admitting: RADIOLOGY
Payer: COMMERCIAL

## 2023-02-21 ENCOUNTER — APPOINTMENT (OUTPATIENT)
Dept: INTERVENTIONAL RADIOLOGY/VASCULAR | Facility: CLINIC | Age: 58
End: 2023-02-21
Attending: RADIOLOGY
Payer: COMMERCIAL

## 2023-02-21 ENCOUNTER — HOSPITAL ENCOUNTER (OUTPATIENT)
Dept: NUCLEAR MEDICINE | Facility: CLINIC | Age: 58
Setting detail: NUCLEAR MEDICINE
Discharge: HOME | End: 2023-02-21
Attending: RADIOLOGY | Admitting: RADIOLOGY
Payer: COMMERCIAL

## 2023-02-21 ENCOUNTER — APPOINTMENT (OUTPATIENT)
Dept: MEDSURG UNIT | Facility: CLINIC | Age: 58
End: 2023-02-21
Attending: RADIOLOGY
Payer: COMMERCIAL

## 2023-02-21 VITALS
OXYGEN SATURATION: 97 % | RESPIRATION RATE: 16 BRPM | BODY MASS INDEX: 29.07 KG/M2 | WEIGHT: 180.1 LBS | HEART RATE: 80 BPM | SYSTOLIC BLOOD PRESSURE: 109 MMHG | DIASTOLIC BLOOD PRESSURE: 68 MMHG

## 2023-02-21 DIAGNOSIS — C22.0 HCC (HEPATOCELLULAR CARCINOMA) (H): ICD-10-CM

## 2023-02-21 LAB
ALBUMIN SERPL BCG-MCNC: 3.7 G/DL (ref 3.5–5.2)
ALP SERPL-CCNC: 108 U/L (ref 35–104)
ALT SERPL W P-5'-P-CCNC: 37 U/L (ref 10–35)
ANION GAP SERPL CALCULATED.3IONS-SCNC: 12 MMOL/L (ref 7–15)
AST SERPL W P-5'-P-CCNC: 71 U/L (ref 10–35)
BILIRUB DIRECT SERPL-MCNC: 0.46 MG/DL (ref 0–0.3)
BILIRUB SERPL-MCNC: 1.2 MG/DL
BUN SERPL-MCNC: 11.2 MG/DL (ref 6–20)
CALCIUM SERPL-MCNC: 8.9 MG/DL (ref 8.6–10)
CHLORIDE SERPL-SCNC: 104 MMOL/L (ref 98–107)
CREAT SERPL-MCNC: 0.68 MG/DL (ref 0.51–0.95)
DEPRECATED HCO3 PLAS-SCNC: 22 MMOL/L (ref 22–29)
ERYTHROCYTE [DISTWIDTH] IN BLOOD BY AUTOMATED COUNT: 13.5 % (ref 10–15)
GFR SERPL CREATININE-BSD FRML MDRD: >90 ML/MIN/1.73M2
GLUCOSE SERPL-MCNC: 95 MG/DL (ref 70–99)
HCT VFR BLD AUTO: 43.4 % (ref 35–47)
HGB BLD-MCNC: 14.6 G/DL (ref 11.7–15.7)
INR PPP: 1.07 (ref 0.85–1.15)
MCH RBC QN AUTO: 35 PG (ref 26.5–33)
MCHC RBC AUTO-ENTMCNC: 33.6 G/DL (ref 31.5–36.5)
MCV RBC AUTO: 104 FL (ref 78–100)
PLATELET # BLD AUTO: 142 10E3/UL (ref 150–450)
POTASSIUM SERPL-SCNC: 3.5 MMOL/L (ref 3.4–5.3)
PROT SERPL-MCNC: 7.8 G/DL (ref 6.4–8.3)
RBC # BLD AUTO: 4.17 10E6/UL (ref 3.8–5.2)
SODIUM SERPL-SCNC: 138 MMOL/L (ref 136–145)
WBC # BLD AUTO: 4.1 10E3/UL (ref 4–11)

## 2023-02-21 PROCEDURE — C9113 INJ PANTOPRAZOLE SODIUM, VIA: HCPCS | Performed by: PHYSICIAN ASSISTANT

## 2023-02-21 PROCEDURE — 76937 US GUIDE VASCULAR ACCESS: CPT

## 2023-02-21 PROCEDURE — C1769 GUIDE WIRE: HCPCS

## 2023-02-21 PROCEDURE — 999N000134 HC STATISTIC PP CARE STAGE 3

## 2023-02-21 PROCEDURE — 82310 ASSAY OF CALCIUM: CPT | Performed by: NURSE PRACTITIONER

## 2023-02-21 PROCEDURE — 36247 INS CATH ABD/L-EXT ART 3RD: CPT

## 2023-02-21 PROCEDURE — 85610 PROTHROMBIN TIME: CPT | Performed by: NURSE PRACTITIONER

## 2023-02-21 PROCEDURE — 78830 RP LOCLZJ TUM SPECT W/CT 1: CPT | Mod: 26 | Performed by: RADIOLOGY

## 2023-02-21 PROCEDURE — 258N000003 HC RX IP 258 OP 636: Performed by: RADIOLOGY

## 2023-02-21 PROCEDURE — A9540 TC99M MAA: HCPCS | Performed by: RADIOLOGY

## 2023-02-21 PROCEDURE — C1760 CLOSURE DEV, VASC: HCPCS

## 2023-02-21 PROCEDURE — 78830 RP LOCLZJ TUM SPECT W/CT 1: CPT

## 2023-02-21 PROCEDURE — 250N000011 HC RX IP 250 OP 636: Performed by: RADIOLOGY

## 2023-02-21 PROCEDURE — 74175 CTA ABDOMEN W/CONTRAST: CPT

## 2023-02-21 PROCEDURE — 75774 ARTERY X-RAY EACH VESSEL: CPT | Mod: XU

## 2023-02-21 PROCEDURE — 80053 COMPREHEN METABOLIC PANEL: CPT | Performed by: NURSE PRACTITIONER

## 2023-02-21 PROCEDURE — 250N000013 HC RX MED GY IP 250 OP 250 PS 637: Performed by: RADIOLOGY

## 2023-02-21 PROCEDURE — 85027 COMPLETE CBC AUTOMATED: CPT | Performed by: NURSE PRACTITIONER

## 2023-02-21 PROCEDURE — 99152 MOD SED SAME PHYS/QHP 5/>YRS: CPT

## 2023-02-21 PROCEDURE — C1887 CATHETER, GUIDING: HCPCS

## 2023-02-21 PROCEDURE — 75726 ARTERY X-RAYS ABDOMEN: CPT | Mod: XU

## 2023-02-21 PROCEDURE — 250N000009 HC RX 250: Performed by: RADIOLOGY

## 2023-02-21 PROCEDURE — 82248 BILIRUBIN DIRECT: CPT | Performed by: NURSE PRACTITIONER

## 2023-02-21 PROCEDURE — 999N000142 HC STATISTIC PROCEDURE PREP ONLY

## 2023-02-21 PROCEDURE — 272N000566 HC SHEATH CR3

## 2023-02-21 PROCEDURE — 250N000011 HC RX IP 250 OP 636: Performed by: PHYSICIAN ASSISTANT

## 2023-02-21 PROCEDURE — 255N000002 HC RX 255 OP 636: Performed by: RADIOLOGY

## 2023-02-21 PROCEDURE — 37242 VASC EMBOLIZE/OCCLUDE ARTERY: CPT

## 2023-02-21 PROCEDURE — 99207 PR SATISFY VISIT NUMBER: CPT | Mod: GC | Performed by: RADIOLOGY

## 2023-02-21 PROCEDURE — 36415 COLL VENOUS BLD VENIPUNCTURE: CPT | Performed by: NURSE PRACTITIONER

## 2023-02-21 PROCEDURE — 343N000001 HC RX 343: Performed by: RADIOLOGY

## 2023-02-21 PROCEDURE — 272N000504 HC NEEDLE CR4

## 2023-02-21 PROCEDURE — 272N000143 HC KIT CR3

## 2023-02-21 RX ORDER — HEPARIN SODIUM 200 [USP'U]/100ML
1 INJECTION, SOLUTION INTRAVENOUS CONTINUOUS PRN
Status: DISCONTINUED | OUTPATIENT
Start: 2023-02-21 | End: 2023-02-21

## 2023-02-21 RX ORDER — SODIUM CHLORIDE 9 MG/ML
INJECTION, SOLUTION INTRAVENOUS CONTINUOUS
Status: DISCONTINUED | OUTPATIENT
Start: 2023-02-21 | End: 2023-02-21

## 2023-02-21 RX ORDER — OXYCODONE HYDROCHLORIDE 5 MG/1
5 TABLET ORAL EVERY 4 HOURS PRN
Status: COMPLETED | OUTPATIENT
Start: 2023-02-21 | End: 2023-02-21

## 2023-02-21 RX ORDER — NALOXONE HYDROCHLORIDE 0.4 MG/ML
0.2 INJECTION, SOLUTION INTRAMUSCULAR; INTRAVENOUS; SUBCUTANEOUS
Status: DISCONTINUED | OUTPATIENT
Start: 2023-02-21 | End: 2023-02-21

## 2023-02-21 RX ORDER — FENTANYL CITRATE 50 UG/ML
25-50 INJECTION, SOLUTION INTRAMUSCULAR; INTRAVENOUS EVERY 5 MIN PRN
Status: DISCONTINUED | OUTPATIENT
Start: 2023-02-21 | End: 2023-02-21

## 2023-02-21 RX ORDER — AMPICILLIN AND SULBACTAM 2; 1 G/1; G/1
3 INJECTION, POWDER, FOR SOLUTION INTRAMUSCULAR; INTRAVENOUS
Status: COMPLETED | OUTPATIENT
Start: 2023-02-21 | End: 2023-02-21

## 2023-02-21 RX ORDER — IODIXANOL 320 MG/ML
100 INJECTION, SOLUTION INTRAVASCULAR ONCE
Status: COMPLETED | OUTPATIENT
Start: 2023-02-21 | End: 2023-02-21

## 2023-02-21 RX ORDER — LIDOCAINE 40 MG/G
CREAM TOPICAL
Status: DISCONTINUED | OUTPATIENT
Start: 2023-02-21 | End: 2023-02-21

## 2023-02-21 RX ORDER — ONDANSETRON 2 MG/ML
4 INJECTION INTRAMUSCULAR; INTRAVENOUS ONCE
Status: COMPLETED | OUTPATIENT
Start: 2023-02-21 | End: 2023-02-21

## 2023-02-21 RX ORDER — FLUMAZENIL 0.1 MG/ML
0.2 INJECTION, SOLUTION INTRAVENOUS
Status: DISCONTINUED | OUTPATIENT
Start: 2023-02-21 | End: 2023-02-21

## 2023-02-21 RX ORDER — NALOXONE HYDROCHLORIDE 0.4 MG/ML
0.4 INJECTION, SOLUTION INTRAMUSCULAR; INTRAVENOUS; SUBCUTANEOUS
Status: DISCONTINUED | OUTPATIENT
Start: 2023-02-21 | End: 2023-02-21

## 2023-02-21 RX ORDER — NITROGLYCERIN 5 MG/ML
100-500 VIAL (ML) INTRAVENOUS
Status: DISCONTINUED | OUTPATIENT
Start: 2023-02-21 | End: 2023-02-21

## 2023-02-21 RX ADMIN — HYDROCORTISONE SODIUM SUCCINATE 100 MG: 100 INJECTION, POWDER, FOR SOLUTION INTRAMUSCULAR; INTRAVENOUS at 08:09

## 2023-02-21 RX ADMIN — FENTANYL CITRATE 25 MCG: 50 INJECTION, SOLUTION INTRAMUSCULAR; INTRAVENOUS at 09:27

## 2023-02-21 RX ADMIN — NITROGLYCERIN 200 MCG: 20 INJECTION INTRAVENOUS at 11:02

## 2023-02-21 RX ADMIN — PANTOPRAZOLE SODIUM 40 MG: 40 INJECTION, POWDER, FOR SOLUTION INTRAVENOUS at 08:09

## 2023-02-21 RX ADMIN — FENTANYL CITRATE 25 MCG: 50 INJECTION, SOLUTION INTRAMUSCULAR; INTRAVENOUS at 09:19

## 2023-02-21 RX ADMIN — FENTANYL CITRATE 25 MCG: 50 INJECTION, SOLUTION INTRAMUSCULAR; INTRAVENOUS at 10:03

## 2023-02-21 RX ADMIN — FENTANYL CITRATE 25 MCG: 50 INJECTION, SOLUTION INTRAMUSCULAR; INTRAVENOUS at 11:31

## 2023-02-21 RX ADMIN — ONDANSETRON 4 MG: 2 INJECTION INTRAMUSCULAR; INTRAVENOUS at 09:33

## 2023-02-21 RX ADMIN — MIDAZOLAM 0.5 MG: 1 INJECTION INTRAMUSCULAR; INTRAVENOUS at 09:44

## 2023-02-21 RX ADMIN — AMPICILLIN SODIUM AND SULBACTAM SODIUM 3 G: 2; 1 INJECTION, POWDER, FOR SOLUTION INTRAMUSCULAR; INTRAVENOUS at 08:10

## 2023-02-21 RX ADMIN — MIDAZOLAM 0.5 MG: 1 INJECTION INTRAMUSCULAR; INTRAVENOUS at 09:27

## 2023-02-21 RX ADMIN — MIDAZOLAM 0.5 MG: 1 INJECTION INTRAMUSCULAR; INTRAVENOUS at 10:23

## 2023-02-21 RX ADMIN — HEPARIN SODIUM 2 BAG: 200 INJECTION, SOLUTION INTRAVENOUS at 09:53

## 2023-02-21 RX ADMIN — FENTANYL CITRATE 25 MCG: 50 INJECTION, SOLUTION INTRAMUSCULAR; INTRAVENOUS at 09:44

## 2023-02-21 RX ADMIN — MIDAZOLAM 0.5 MG: 1 INJECTION INTRAMUSCULAR; INTRAVENOUS at 10:51

## 2023-02-21 RX ADMIN — FENTANYL CITRATE 25 MCG: 50 INJECTION, SOLUTION INTRAMUSCULAR; INTRAVENOUS at 10:51

## 2023-02-21 RX ADMIN — OXYCODONE HYDROCHLORIDE 5 MG: 5 TABLET ORAL at 12:58

## 2023-02-21 RX ADMIN — NITROGLYCERIN 200 MCG: 20 INJECTION INTRAVENOUS at 11:24

## 2023-02-21 RX ADMIN — IODIXANOL 150 ML: 320 INJECTION, SOLUTION INTRAVASCULAR at 11:44

## 2023-02-21 RX ADMIN — MIDAZOLAM 0.5 MG: 1 INJECTION INTRAMUSCULAR; INTRAVENOUS at 09:19

## 2023-02-21 RX ADMIN — FENTANYL CITRATE 25 MCG: 50 INJECTION, SOLUTION INTRAMUSCULAR; INTRAVENOUS at 10:23

## 2023-02-21 RX ADMIN — KIT FOR THE PREPARATION OF TECHNETIUM TC 99M ALBUMIN AGGREGATED 4.8 MILLICURIE: 2.5 INJECTION, POWDER, FOR SOLUTION INTRAVENOUS at 13:30

## 2023-02-21 RX ADMIN — MIDAZOLAM 0.5 MG: 1 INJECTION INTRAMUSCULAR; INTRAVENOUS at 10:04

## 2023-02-21 RX ADMIN — LIDOCAINE HYDROCHLORIDE 8 ML: 10 INJECTION, SOLUTION EPIDURAL; INFILTRATION; INTRACAUDAL; PERINEURAL at 09:53

## 2023-02-21 ASSESSMENT — ACTIVITIES OF DAILY LIVING (ADL)
ADLS_ACUITY_SCORE: 35

## 2023-02-21 NOTE — PRE-PROCEDURE
GENERAL PRE-PROCEDURE:   Procedure:  Y90 Mapping    Written consent obtained?: Yes    Risks and benefits: Risks, benefits and alternatives were discussed    Consent given by:  Patient  Patient states understanding of procedure being performed: Yes    Patient's understanding of procedure matches consent: Yes    Procedure consent matches procedure scheduled: Yes    Expected level of sedation:  Moderate  Appropriately NPO:  Yes  Mallampati  :  Grade 3- soft palate visible, posterior pharyngeal wall not visible  Lungs:  Lungs clear with good breath sounds bilaterally  Heart:  Normal heart sounds and rate  History & Physical reviewed:  History and physical reviewed and no updates needed  Statement of review:  I have reviewed the lab findings, diagnostic data, medications, and the plan for sedation

## 2023-02-21 NOTE — PROGRESS NOTES
Pt admitted to 2A for a Y90 mapping.  Consent done.  Labs pending.  Pt has a history of hysterectomy.  Pt is ready for procedure.      1440: Pt up in hein to bathroom and tolerated well.  VSS.  Right groin flat and dry.  PIV discontinued.  Discharge instructions went over with pt and .  Pt has delivery next Monday and pt aware.  Pt does c/o pain 5/10 in groin.  Oxycodone x1 and ice with some relief.  Pt wheeled to front lobby at 1510.  Pt  will drive her home.

## 2023-02-21 NOTE — PROCEDURES
[facility]    Procedure: IR Procedure Note    Date/Time: 2/21/2023 11:53 AM  Performed by: Golden Rueda MD  Authorized by: Yarely Shah MD       UNIVERSAL PROTOCOL   Site Marked: NA  Prior Images Obtained and Reviewed:  Yes  Required items: Required blood products, implants, devices and special equipment available    Patient identity confirmed:  Verbally with patient, arm band, provided demographic data and hospital-assigned identification number  Patient was reevaluated immediately before administering moderate or deep sedation or anesthesia  Confirmation Checklist:  Patient's identity using two indicators, relevant allergies, procedure was appropriate and matched the consent or emergent situation and correct equipment/implants were available  Time out: Immediately prior to the procedure a time out was called    Universal Protocol: the Joint Commission Universal Protocol was followed    Preparation: Patient was prepped and draped in usual sterile fashion       ANESTHESIA    Anesthesia: Local infiltration  Local Anesthetic:  Lidocaine 1% without epinephrine      SEDATION  Patient Sedated: Yes    Sedation:  Fentanyl and midazolam  Vital signs: Vital signs monitored during sedation    See dictated procedure note for full details.  Findings: Y90 Mapping    Specimens: none    Complications: None    Condition: Stable    Plan: Y90 Mapping      PROCEDURE  Describe Procedure: Y90 Mapping  Patient Tolerance:  Patient tolerated the procedure well with no immediate complications  Length of time physician/provider present for 1:1 monitoring during sedation: 30

## 2023-02-21 NOTE — DISCHARGE INSTRUCTIONS
[facility]   Interventional Radiology  Discharge Instructions Post Angiography for Y90    AFTER YOU GO HOME          Relax and take it easy for 24 hours.       Drink plenty of fluids.       Resume your regular diet, unless otherwise instructed by your Primary Physician.       DO NOT smoke for at least 24 hours, if you were given any sedation.       DO NOT drink alcoholic beverages the day of your procedure.       DO NOT drive or operate machinery at home or at work for 24 hours.          DO NOT make any important or legal decisions for 24 hours following your procedure.       DO NOT take a shower for at least 12 hours following your procedure. No tub bath, hot tubs, or swimming for 5 days        Care of groin site  It is normal to have a small bruise or lump at the site.  For the first 2 days, when you cough, sneeze or move your bowels, hold your hand over the puncture site and press gently.  Do NOT lift more than 10 pounds or do any strenuous exercise for at least 3 to 5 days.  Do not use lotion or powder near the puncture site for 3 days.  If you start bleeding from the site in your groin: lie down flat and press firmly on the site. Call your doctor as soon as you can.   Call 911 right away if you have: Heavy bleeding, bleeding that does not stop.      CALL THE PHYSICIAN IF:       - You start bleeding from the procedure site. A small lump or bruise is common at the puncture site. Your physician will tell you if you need to return to the hospital.      - You develop numbness, coolness or a change in color of the leg that was punctured.      - You experience increased pain or redness at the puncture site.      - You develop hives or a rash or unexplained itching.      - You develop a temperature of 101 degrees F or greater.             Closure Device: MYNX closure device             [facility] INTERVENTIONAL RADIOLOGY DEPARTMENT         Procedure Physician:  Dr. Joe Alfaro  of Procedure:February 21, 2023       Telephone Numbers:   [phone]      Monday-Friday 7:30 am to 4:00 pm                                        [phone]     After 4:00 pm Monday-Friday, Weekend and Holidays. Ask for the Interventional Radiologist on call. Someone is on call 24 hrs/day.

## 2023-02-21 NOTE — IR NOTE
Patient Name: Abiola Matute  Medical Record Number: [id number]  Today's Date: 2/21/2023    Procedure: Y90 mapping  Proceduralist: Dr. Shah, Dr. Rueda    Procedure Start: 924   Procedure end: 1140  Sedation medications administered: 175 mcg fentanyl, 3 mg versed   Other medications: Nitroglycerin 400 mcg IA  Sedation time: 141 minutes    Report given to: Jeannette GOODRICH 2A  : ulises    Right groin accessed.   Mynx closure device deployed at 1138. Hemostasis at 1140.  3 hours flat bedrest.     Other Notes: Pt arrived to IR room 1 from . Consent reviewed. Pt denies any questions or concerns regarding procedure. Pt positioned supine and monitored per protocol. Pt tolerated procedure without any noted complications. Pt transferred back to .

## 2023-02-22 ENCOUNTER — TELEPHONE (OUTPATIENT)
Dept: TRANSPLANT | Facility: CLINIC | Age: 58
End: 2023-02-22
Payer: MEDICARE

## 2023-02-22 ENCOUNTER — TELEPHONE (OUTPATIENT)
Dept: GASTROENTEROLOGY | Facility: CLINIC | Age: 58
End: 2023-02-22

## 2023-02-22 DIAGNOSIS — K50.113 CROHN'S DISEASE OF LARGE INTESTINE WITH FISTULA (H): Primary | ICD-10-CM

## 2023-02-22 NOTE — TELEPHONE ENCOUNTER
Transplant Social Work Services Phone Call    Data: Virginia is in evaluation for liver transplant. Virginia had a positive PEth on 1/20/2023 of 59. Negative PEth and EtG on 2/14/2023. After further discussion, Virginia reported that she had a celebratory drink at her daughters wedding on New years. She completed a substance use assessment as part of her liver transplant evaluation on 2/17/2023. It is recommended to engage in an intensive outpatient program.   Intervention: I called Virginia and reviewed RENEE assessment. She is agreeable and voices understanding towards recommendation. Virginia continues to voice that she does not prefer group treatment, however understands the requirement to engage as part of our liver transplant program. [facility] in [place] (Alana's group). Virginia would like to focus on her medical needs at this time. I asked Virginia to call Conchis back as soon as possible and indicated that her RENEE assessment is only valid for about 30 days. Virginia will call Conchis back and asked for this writer to send her a MyChart and paper copy of instructions.   Assessment: It is recommended that she engaging in an intensive outpatient program.   Education provided by [facility]: RENEE and transplant program.   Plan: This writer will continue to follow for transplant evaluation.     RODGER Newby, [facility]  Liver Transplant   [facility]  Phone: [phone]  Pager: [phone]

## 2023-02-22 NOTE — TELEPHONE ENCOUNTER
----- Message from Farhan Schilling MD sent at 2/22/2023  9:25 AM CST -----  Hey -     Can we get a predict IFX on her?    Thanks!

## 2023-02-23 ENCOUNTER — DOCUMENTATION ONLY (OUTPATIENT)
Dept: GASTROENTEROLOGY | Facility: CLINIC | Age: 58
End: 2023-02-23
Payer: MEDICARE

## 2023-02-23 ENCOUNTER — TELEPHONE (OUTPATIENT)
Dept: RADIOLOGY | Facility: CLINIC | Age: 58
End: 2023-02-23
Payer: MEDICARE

## 2023-02-23 ENCOUNTER — VIRTUAL VISIT (OUTPATIENT)
Dept: BEHAVIORAL HEALTH | Facility: CLINIC | Age: 58
End: 2023-02-23
Attending: INTERNAL MEDICINE
Payer: COMMERCIAL

## 2023-02-23 DIAGNOSIS — F10.20 ALCOHOL USE DISORDER, MODERATE, DEPENDENCE (H): ICD-10-CM

## 2023-02-23 DIAGNOSIS — F43.23 ADJUSTMENT DISORDER WITH MIXED ANXIETY AND DEPRESSED MOOD: Primary | ICD-10-CM

## 2023-02-23 PROBLEM — K50.113 CROHN'S DISEASE OF LARGE INTESTINE WITH FISTULA (H): Status: ACTIVE | Noted: 2023-02-23

## 2023-02-23 PROCEDURE — 90791 PSYCH DIAGNOSTIC EVALUATION: CPT | Mod: VID | Performed by: PSYCHOLOGIST

## 2023-02-23 RX ORDER — ACETAMINOPHEN 325 MG/1
650 TABLET ORAL ONCE
Status: CANCELLED
Start: 2023-02-23 | End: 2023-02-23

## 2023-02-23 RX ORDER — EPINEPHRINE 1 MG/ML
0.3 INJECTION, SOLUTION, CONCENTRATE INTRAVENOUS EVERY 5 MIN PRN
Status: CANCELLED | OUTPATIENT
Start: 2023-02-23

## 2023-02-23 RX ORDER — DIPHENHYDRAMINE HYDROCHLORIDE 50 MG/ML
50 INJECTION INTRAMUSCULAR; INTRAVENOUS
Status: CANCELLED
Start: 2023-02-23

## 2023-02-23 RX ORDER — DIPHENHYDRAMINE HCL 25 MG
25 CAPSULE ORAL ONCE
Status: CANCELLED
Start: 2023-02-23 | End: 2023-02-23

## 2023-02-23 RX ORDER — ALBUTEROL SULFATE 0.83 MG/ML
2.5 SOLUTION RESPIRATORY (INHALATION)
Status: CANCELLED | OUTPATIENT
Start: 2023-02-23

## 2023-02-23 RX ORDER — METHYLPREDNISOLONE SODIUM SUCCINATE 125 MG/2ML
125 INJECTION, POWDER, LYOPHILIZED, FOR SOLUTION INTRAMUSCULAR; INTRAVENOUS
Status: CANCELLED
Start: 2023-02-23

## 2023-02-23 RX ORDER — MEPERIDINE HYDROCHLORIDE 25 MG/ML
25 INJECTION INTRAMUSCULAR; INTRAVENOUS; SUBCUTANEOUS EVERY 30 MIN PRN
Status: CANCELLED | OUTPATIENT
Start: 2023-02-23

## 2023-02-23 RX ORDER — ALBUTEROL SULFATE 90 UG/1
1-2 AEROSOL, METERED RESPIRATORY (INHALATION)
Status: CANCELLED
Start: 2023-02-23

## 2023-02-23 NOTE — TELEPHONE ENCOUNTER
Called and left a VM for Virginia regarding her mapping procedure on 2/21. Will attempt to call patient again tomorrow to touch base prior to her delivery procedure.     Nunu CONROY RN, BSN   Interventional Radiology [facility]   [phone]

## 2023-02-23 NOTE — TELEPHONE ENCOUNTER
RV:  23  LV: 10/4/22  Crohn's disease:   Current Medications:     - Infliximab 10 mg/kg every 4 weeks 2020.  -- Colorectal surgery follow-up for seton exchange  -- Continue infliximab at current dose  -- Routine labs with infliximab, every other infusion    TB:  NEGATIVE 23    Renew Therapy Plan:  Remicade 10mg/kg every 28days  Reorder Standing Labs: CBC, CRP, ESR, LFTs  Order TB:   no  Discontinue  Therapy Plan: yes    Patient therapy plan updated due to plan expiration. Patient continues on medication per last clinic encounter. Standing lab orders also updated in the patient chart.

## 2023-02-23 NOTE — TELEPHONE ENCOUNTER
February 23, 2023    Called Blake, Left message and contact info to return call regarding: pt's last infusion

## 2023-02-23 NOTE — TELEPHONE ENCOUNTER
Shantal Pharmacist return my call, she states Virginia's last infusion on 2/6/23 and next infusion is 3/6/23.    February 23, 2023    Called Virginia, Left message and contact info to return call regarding: lab test    Sent PM regarding above.      Hello,    Can you please fill out and scan into the patients chart a predictr [employer]K [employer] lab order form for this patient.    Medication:  Remicade  Dose: 10 mg/kg  Total Dose: 800 mg  Frequency: every 4 weeks  Provider: Shakir  Last infusion: 2/6/23  Next infusion: 3/6/23  When is lab due: 2/27/23 and prior to 3/6/23  Does the patient need a lab appointment: Pt will need an appointment    Thank you

## 2023-02-23 NOTE — LETTER
2023       RE: Abiola Matute  [address] [address]     Dear Colleague,    Thank you for referring your patient, Abiola Matute, to the [facility] MENTAL HEALTH & ADDI[facility] SERVICES at [facility]. Please see a copy of my visit note below.        [facility]: Integrated Behavioral Health      PATIENT'S NAME: Abiola Matute  PREFERRED NAME: [place]  PRONOUNS: she/her/hers   MRN: [id number]  : 1965  ADDRESS: [address] [address]  ACCT. NUMBER:  [id number]  DATE OF SERVICE: 23  START TIME: 10:02  END TIME: 10:48  PREFERRED PHONE: [phone]  May we leave a program related message: Yes  SERVICE MODALITY:  Video Visit:      Provider verified identity through the following two step process.  Patient provided:  Patient photo and Patient     Telemedicine Visit: The patient's condition can be safely assessed and treated via synchronous audio and visual telemedicine encounter.      Reason for Telemedicine Visit: Patient convenience (e.g. access to timely appointments / distance to available provider)    Originating Site (Patient Location): Patient's home    Distant Site (Provider Location): Provider Remote Setting- Home Office    Consent:  The patient/guardian has verbally consented to: the potential risks and benefits of telemedicine (video visit) versus in person care; bill my insurance or make self-payment for services provided; and responsibility for payment of non-covered services.     Patient would like the video invitation sent by:  My Chart    Mode of Communication:  Video Conference via [facility]    Distant Location (Provider):  Off-site    As the provider I attest to compliance with applicable laws and regulations related to telemedicine.    UNIVERSAL ADULT Mental Health DIAGNOSTIC ASSESSMENT    Identifying Information:  Patient is a 57  "year old,   individual.  Patient was referred for an assessment by JOHN Newby at [facility].  Patient attended the session alone.    Chief Complaint:   The reason for seeking services at this time is: \"Substance/chemical assessment & mental health support\".  The problem(s) began 21.    Liver cancer - incredibly overwhelming and stressful. Notes symptoms including racing thoughts, worry, trouble relaxing, feeling down on occasion. Denies history of remarkable mental health symptoms or significant treatment history.     History of heavy alcohol use. Date of last use was over the holidays she discloses. Previous drinking pattern was 3-10 drinks per day, lasting about 10 years. Started with transplant around winter of 2022.    Endorses positive social support -  and adult son. Two daughters live close by.     Sobriety from alcohol indefinitely - is committed to sobriety from alcohol long term, though states feeling mild guilt from her history of drinking and how it has affected her health.    H - extended family history of depression and suicide. Mother struggles with anxiety. Grandfather and uncles on mother's side struggle with alcohol use. Siblings struggle with anxiety. Three siblings.    Patient has not attempted to resolve these concerns in the past.    Social/Family History:  Patient reported they grew up in other [place], [place].  They were raised by biological parents.   Parents were always together.  Patient reported that their childhood was \"for the most part healthy. Parents were always together, my mom was a stay-at-home mother and income wise, we were on the lower end of income. Overall healthy until my father  from heart failure about 27 years ago\".  Patient described their current relationships with family of origin as close with immediate family and good relationships with sisters who live in [place].     The patient describes their cultural " background as .  Cultural influences and impact on patient's life structure, values, norms, and healthcare: Tenriism was present in the home. Chemical dependencywith extended family members.  Contextual influences on patient's health include: Contextual Factors: Health- Seeking Factors liver transplant and alcohol use  related treatment.    These factors will be addressed in the Preliminary Treatment plan. Patient identified their preferred language to be English. Patient reported they does not need the assistance of an  or other support involved in therapy.     Patient reported had no significant delays in developmental tasks.   Patient's highest education level was some college  .  Patient identified the following learning problems: none reported.  Modifications will not be used to assist communication in therapy. Patient reports they are  able to understand written materials.    Patient reported the following relationship history  once.  Patient's current relationship status is  for 36 years.  Patient identified their sexual orientation as heterosexual.  Patient reported having 3 child(elisa). Patient identified spouse as part of their support system.  Has two grandchildren. Patient identified the quality of these relationships as good.     Patient's current living/housing situation involves staying in own home/apartment.  The immediate members of family and household include Albino Matute, 63,Spouse and they report that housing is stable.    Patient is currently disabled.  Patient reports their finances are obtained through SSDI disability; spouse. Patient does not identify finances as a current stressor.      Patient reported that they have not been involved with the legal system.  Patient does not report being under probation/ parole/ jurisdiction. They are not under any current court jurisdiction. .    Patient's Strengths and Limitations:  Patient identified the following  strengths or resources that will help them succeed in treatment: commitment to health and well being, friends / good social support, family support, insight and motivation. Things that may interfere with the patient's success in treatment include: none identified.     Assessments:  The following assessments were completed by patient for this visit:  PHQ2:   PHQ-2 ( 1999 [employer]) 2/16/2023 1/3/2023 11/11/2022 10/4/2022 10/4/2022 9/10/2021 1/25/2021   Q1: Little interest or pleasure in doing things - 0 0 0 0 0 0   Q2: Feeling down, depressed or hopeless - 0 0 0 0 0 1   PHQ-2 Score - 0 0 0 0 0 1   PHQ-2 Total Score (12-17 Years)- Positive if 3 or more points; Administer PHQ-A if positive - - - - - 0 1   Q1: Little interest or pleasure in doing things Not at all Not at all Not at all Not at all - Not at all -   Q2: Feeling down, depressed or hopeless Several days Not at all Not at all Not at all - Not at all -   PHQ-2 Score 1 0 0 0 - 0 -   Some encounter information is confidential and restricted. Go to Review [employer] activity to see all data.     PHQ9:   PHQ-9 SCORE 9/27/2013 9/29/2014 2/23/2021   PHQ-9 Total Score 0 - -   PHQ-9 Total Score MyChart - 0 -   PHQ-9 Total Score - - 7     GAD2: No flowsheet data found.  GAD7:   TAINA-7 SCORE 9/27/2013 9/29/2014 2/23/2021   Total Score 0 - -   Total Score - 2 -   Total Score - - 6     CAGE-AID:   CAGE-AID Total Score 2/16/2023 2/16/2023   Total Score 1 -   Total Score MyChart - 1 (A total score of 2 or greater is considered clinically significant)   Some encounter information is confidential and restricted. Go to Review Global [employer] activity to see all data.     PROMIS 10-Global Health (all questions and answers displayed):   PROMIS 10 2/16/2023 2/22/2023   In general, would you say your health is: Good Good   In general, would you say your quality of life is: Good Good   In general, how would you rate your physical health? Fair Fair   In general, how would you rate your  mental health, including your mood and your ability to think? Good Good   In general, how would you rate your satisfaction with your social activities and relationships? Good Good   In general, please rate how well you carry out your usual social activities and roles Good Good   To what extent are you able to carry out your everyday physical activities such as walking, climbing stairs, carrying groceries, or moving a chair? Completely Completely   How often have you been bothered by emotional problems such as feeling anxious, depressed or irritable? Sometimes Rarely   How would you rate your fatigue on average? Mild Mild   How would you rate your pain on average?   0 = No Pain  to  10 = Worst Imaginable Pain 3 3   In general, would you say your health is: - 3   In general, would you say your quality of life is: - 3   In general, how would you rate your physical health? - 2   In general, how would you rate your mental health, including your mood and your ability to think? - 3   In general, how would you rate your satisfaction with your social activities and relationships? - 3   In general, please rate how well you carry out your usual social activities and roles. (This includes activities at home, at work and in your community, and responsibilities as a parent, child, spouse, employee, friend, etc.) - 3   To what extent are you able to carry out your everyday physical activities such as walking, climbing stairs, carrying groceries, or moving a chair? - 5   In the past 7 days, how often have you been bothered by emotional problems such as feeling anxious, depressed, or irritable? - 2   In the past 7 days, how would you rate your fatigue on average? - 2   In the past 7 days, how would you rate your pain on average, where 0 means no pain, and 10 means worst imaginable pain? - 3   Global Mental Health Score - 13   Global Physical Health Score - 15   PROMIS TOTAL - SUBSCORES - 28   Some encounter information is  confidential and restricted. Go to Review Flows[employer]igo activity to see all data.   Some recent data might be hidden     PROMIS 10-Global Health (only subscores and total score):   PROMIS-10 Scores Only 2/22/2023   Global Mental Health Score 13   Global Physical Health Score 15   PROMIS TOTAL - SUBSCORES 28   Some encounter information is confidential and restricted. Go to Review FlowsViigo activity to see all data.     [place] Suicide Severity Rating Scale (Lifetime/Recent)  [place] Suicide Severity Rating (Lifetime/Recent) 3/28/2019 4/3/2019   Q1 Wished to be Dead (Past Month) no no   Q2 Suicidal Thoughts (Past Month) no no   Q6 Suicide Behavior (Lifetime) - no   Some encounter information is confidential and restricted. Go to Review FlowsViigo activity to see all data.     [place] Suicide Severity Rating Scale (Short Version)  [place] Suicide Severity Rating (Short Version) 4/21/2020 4/21/2020 4/21/2020 2/26/2021 2/26/2021 12/31/2022 1/6/2023   Over the past 2 weeks have you felt down, depressed, or hopeless? no no no no yes no no   Over the past 2 weeks have you had thoughts of killing yourself? no no no - no no no   Have you ever attempted to kill yourself? no no no - - no no   Q1 Wished to be Dead (Past Month) - - - - - - -   Q2 Suicidal Thoughts (Past Month) - - - - - - -   Q6 Suicide Behavior (Lifetime) - - - - - - -   Some encounter information is confidential and restricted. Go to Review Rewardable activity to see all data.       Personal and Family Medical History:  Patient does report a family history of mental health concerns.  Patient reports family history includes Breast Cancer in her mother; Cancer in her sister; Diabetes in her maternal grandfather and paternal grandmother; Gastrointestinal Disease in her mother; Heart Disease in her paternal grandfather; Heart Disease (age of onset: 57) in her father; Hypertension in her maternal grandmother; Skin Cancer in her sister; Substance Abuse in her  maternal grandfather, maternal uncle, and maternal uncle; Suicide in her niece and niece.    Patient does report Mental Health Diagnosis and/or Treatment.  Patient Patient reported the following previous diagnoses which include(s): none reported . 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  Patient has received mental health services in the past: family therapy and a individual therapist for several sessions over a decade ago. Psychiatric Hospitalizations: none when Patient denies a history of civil commitment.  Currently, patient none  receiving other mental health services.  These include none.       Patient has had a physical exam to rule out medical causes for current symptoms.  Date of last physical exam was within the past year. Client was encouraged to follow up with PCP if symptoms were to develop. The patient has a [place] Primary Care Provider, who is named Linda Macdonald Patient reports the following current medical concerns:    Patient denies any issues with pain. Does have Crohn's Disease.  There are not significant appetite / nutritional concerns / weight changes.  Does report some eating when stressed. Does report changes to sleep - states feeling more worrisome and racing thoughts at bedtime Patient does not report a history of head injury / trauma / cognitive impairment.      Current Outpatient Medications   Medication     furosemide (LASIX) 40 MG tablet     inFLIXimab (REMICADE) 100 MG injection     Milk Thistle-Dand-Fennel-Licor (MILK THISTLE XTRA) CAPS capsule     Multiple Vitamins-Minerals (MULTIVITAMIN & MINERAL PO)     spironolactone (ALDACTONE) 100 MG tablet     triamcinolone (KENALOG) 0.1 % external cream     No current facility-administered medications for this visit.     Facility-Administered Medications Ordered in Other Visits   Medication     BREEZA Lemon-Lime flavored oral liquid for Neutral Abdominal/Pelvic Imaging 1,000 mL     hyoscyamine (LEVSIN/SL)  sublingual tablet 125 mcg         Medication Adherence:  Patient reports taking prescribed medications as prescribed.    Patient Allergies:    Allergies   Allergen Reactions     Fish Oil      Redness and itching around eye area only - went away when fish oil capsules stopped      Metronidazole      pain/itching     Naphthalenemethylamines      Lamisil = mild urticarial reaction     Ppd [Tuberculin Purified Protein Derivative]      Sulfa Drugs      hives       Medical History:    Past Medical History:   Diagnosis Date     Alcohol abuse      Alcoholic cirrhosis of liver with ascites      Anal fistula      Atypical ductal hyperplasia of breast 09/10/2010    ERT not recommended -left - and flat epithelial atypia-scheduled for breast biopsy 9/17/2010      Bifid uvula      Cholelithiasis      Contact perianal dermatitis and other eczema     recurrent - clobetasol      Crohn disease      Fear of flying      - gets Ativan prn.      HCC (hepatocellular carcinoma) (H) 2/1/2023     Hypertension      IBS (irritable bowel syndrome)          Current Mental Status Exam:   Appearance:  Appropriate    Eye Contact:  Good   Psychomotor:  Normal       Gait / station:  no problem  Attitude / Demeanor: Cooperative   Speech      Rate / Production: Normal/ Responsive      Volume:  Normal  volume      Language:  intact  Mood:   Anxious   Affect:   Appropriate    Thought Content: Clear   Thought Process: Logical       Associations: No loosening of associations  Insight:   Good   Judgment:  Intact   Orientation:  All  Attention/concentration: Good      Substance Use:  Patient did report a family history of substance use concerns; see medical history section for details.  Patient has not received chemical dependency treatment in the past.  Patient has not ever been to detox.      Patient is not currently receiving any chemical dependency treatment.           Substance History of use Age of first use Date of last use     Pattern and duration of  use (include amounts and frequency)   Alcohol used in the past   16 12/29/22 REPORTS SUBSTANCE USE: N/A   Cannabis   used in the past 54 01/01/22 REPORTS SUBSTANCE USE: N/A     Amphetamines   never used     REPORTS SUBSTANCE USE: N/A   Cocaine/crack    never used       REPORTS SUBSTANCE USE: N/A   Hallucinogens never used         REPORTS SUBSTANCE USE: N/A   Inhalants never used         REPORTS SUBSTANCE USE: N/A   Heroin never used         REPORTS SUBSTANCE USE: N/A   Other Opiates never used     REPORTS SUBSTANCE USE: N/A   Benzodiazepine   never used     REPORTS SUBSTANCE USE: N/A   Barbiturates never used     REPORTS SUBSTANCE USE: N/A   Over the counter meds never used     REPORTS SUBSTANCE USE: N/A   Caffeine currently use 15   REPORTS SUBSTANCE USE: N/A   Nicotine  never used     REPORTS SUBSTANCE USE: N/A   Other substances not listed above:  Identify:  never used     REPORTS SUBSTANCE USE: N/A     Patient reported the following problems as a result of their substance use: no problems, not applicable.    Substance Use: No symptoms    Based on the negative CAGE score and clinical interview there  are not indications of drug or alcohol abuse.      Significant Losses / Trauma / Abuse / Neglect Issues:   Patient did not serve in the .  There are indications or report of significant loss, trauma, abuse or neglect issues related to: are no indications and client denies any losses, trauma, abuse, or neglect concerns.  Concerns for possible neglect are not present.     Safety Assessment:   Patient denies current homicidal ideation and behaviors.  Patient denies current self-injurious ideation and behaviors.    Patient denied risk behaviors associated with substance use.  Patient denies any high risk behaviors associated with mental health symptoms.  Patient reports the following current concerns for their personal safety: None.  Patient reports there are firearms in the house.     yes, they are secured. The  firearms are secured in a locked space.    History of Safety Concerns:  Patient denied a history of homicidal ideation.     Patient denied a history of personal safety concerns.    Patient denied a history of assaultive behaviors.    Patient denied a history of sexual assault behaviors.     Patient denied a history of risk behaviors associated with substance use.  Patient denies any history of high risk behaviors associated with mental health symptoms.  Patient reports the following protective factors: dedication to family or friends; safe and stable environment; abstinence from substances; agreement to use safety plan; living with other people; daily obligations; commitment to well being; healthy fear of risky behaviors or pain; sense of personal control or determination    Risk Plan:  See Recommendations for Safety and Risk Management Plan    Review of Symptoms per patient report:   Depression: Change in sleep, Lack of interest, Change in energy level, Change in appetite, Low self-worth, Ruminations and Feeling sad, down, or depressed  Mayte:  No Symptoms  Psychosis: No Symptoms  Anxiety: Excessive worry, Nervousness and Sleep disturbance  Panic:  No symptoms  Post Traumatic Stress Disorder:  No Symptoms   Eating Disorder: No Symptoms  ADD / ADHD:  No symptoms  Conduct Disorder: No symptoms  Autism Spectrum Disorder: No symptoms  Obsessive Compulsive Disorder: No Symptoms    Patient reports the following compulsive behaviors and treatment history: None identified.      Diagnostic Criteria:   Adjustment Disorder  A. The development of emotional or behavioral symptoms in response to an identifiable stressor(s) occurring within 3 months of the onset of the stressor(s)  B. These symptoms or behaviors are clinically significant, as evidenced by one or both of the following:       - Significant impairment in social, occupational, or other important areas of functioning  C. The stress-related disturbance does not meet  criteria for another disorder & is not not an exacerbation of another mental disorder  D. The symptoms do not represent normal bereavement  E. Once the stressor or its consequences have terminated, the symptoms do not persist for more than an additional 6 months       * Adjustment Disorder with Mixed Anxiety and Depressed Mood: The predominant manifestation is a combination of depression and anxiety    Functional Status:  Patient reports the following functional impairments:  chronic disease management and social interactions.     Nonprogrammatic care:  Patient is requesting basic services to address current mental health concerns.    Clinical Summary:  1. Reason for assessment: SOT recommended counseling for support.  2. Psychosocial, Cultural and Contextual Factors: Good social support.  3. Principal DSM5 Diagnoses  (Sustained by DSM5 Criteria Listed Above):   Adjustment Disorders  309.28 (F43.23) With mixed anxiety and depressed mood.  4. Other Diagnoses that is relevant to services:   Substance-Related & Addictive Disorders Alcohol Use Disorder   303.90 (F10.20) Moderate    5. Provisional Diagnosis:  None  6. Prognosis: Expect Improvement.  7. Likely consequences of symptoms if not treated: deterioration of mood and quality of life.  8. Client strengths include:  committed to sobriety, educated, has a previous history of therapy, insightful, intelligent, motivated, open to suggestions / feedback, support of family, friends and providers, wants to learn and willing to relate to others.    Recommendations:     1. Plan for Safety and Risk Management:   Safety and Risk: Recommended that patient call 911 or go to the local ED should there be a change in any of these risk factors..          Report to child / adult protection services was NA.     2. Patient's identified no immediate cultural or diversity related matters.     3. Initial Treatment will focus on:    Adjustment Difficulties related to: health concerns and  SOT engagement.     4. Resources/Service Plan:    services are not indicated.   Modifications to assist communication are not indicated.   Additional disability accommodations are not indicated.      5. Collaboration:   Collaboration / coordination of treatment will be initiated with the following  support professionals: [facility] care team.      6.  Referrals:   The following referral(s) will be initiated: None. Next Scheduled Appointment: continue with [facility].      A Release of Information has been obtained for the following: None.     Emergency Contact was not obtained.      Clinical Substantiation/medical necessity for the above recommendations:  1:1 counseling recommended for helping patient learn adaptive skills in sobriety, receive additional support during transplant process. Without service, there is a stronger risk of relapse with alcohol and negative implications for transplant status.    7. RENEE:    RENEE:  Discussed the general effects of drugs and alcohol on health and well-being and Attend a sober community support program including AA, [facility], [facility], etc.)..Provider gave patient printed information about the  effects of chemical use on their health and well being. Recommendations:  Follow all recommendations from CD evaluation; see EHR for details.     8. Records:   These were reviewed at time of assessment.   Information in this assessment was obtained from the medical record and provided by patient who is a good historian. Patient will have open access to their mental health medical record.    9.   Interactive Complexity: No      Provider Name/ Credentials:  Patel Dumont LP  February 23, 2023      Patel Dumont

## 2023-02-23 NOTE — PROGRESS NOTES
"    [facility]: Integrated Behavioral Health      PATIENT'S NAME: Abiola Matute  PREFERRED NAME: [place]  PRONOUNS: she/her/hers   MRN: [id number]  : 1965  ADDRESS: [address] [place] [id number]  ACCT. NUMBER:  [id number]  DATE OF SERVICE: 23  START TIME: 10:02  END TIME: 10:48  PREFERRED PHONE: [phone]  May we leave a program related message: Yes  SERVICE MODALITY:  Video Visit:      Provider verified identity through the following two step process.  Patient provided:  Patient photo and Patient     Telemedicine Visit: The patient's condition can be safely assessed and treated via synchronous audio and visual telemedicine encounter.      Reason for Telemedicine Visit: Patient convenience (e.g. access to timely appointments / distance to available provider)    Originating Site (Patient Location): Patient's home    Distant Site (Provider Location): Provider Remote Setting- Home Office    Consent:  The patient/guardian has verbally consented to: the potential risks and benefits of telemedicine (video visit) versus in person care; bill my insurance or make self-payment for services provided; and responsibility for payment of non-covered services.     Patient would like the video invitation sent by:  My Chart    Mode of Communication:  Video Conference via [facility]    Distant Location (Provider):  Off-site    As the provider I attest to compliance with applicable laws and regulations related to telemedicine.    UNIVERSAL ADULT Mental Health DIAGNOSTIC ASSESSMENT    Identifying Information:  Patient is a 57 year old,   individual.  Patient was referred for an assessment by JOHN Newby at [facility].  Patient attended the session alone.    Chief Complaint:   The reason for seeking services at this time is: \"Substance/chemical assessment & mental health support\".  The problem(s) began " "21.    Liver cancer - incredibly overwhelming and stressful. Notes symptoms including racing thoughts, worry, trouble relaxing, feeling down on occasion. Denies history of remarkable mental health symptoms or significant treatment history.     History of heavy alcohol use. Date of last use was over the holidays she discloses. Previous drinking pattern was 3-10 drinks per day, lasting about 10 years. Started with transplant around winter of 2022.    Endorses positive social support -  and adult son. Two daughters live close by.     Sobriety from alcohol indefinitely - is committed to sobriety from alcohol long term, though states feeling mild guilt from her history of drinking and how it has affected her health.    [facility] - extended family history of depression and suicide. Mother struggles with anxiety. Grandfather and uncles on mother's side struggle with alcohol use. Siblings struggle with anxiety. Three siblings.    Patient has not attempted to resolve these concerns in the past.    Social/Family History:  Patient reported they grew up in other [place], [place].  They were raised by biological parents.   Parents were always together.  Patient reported that their childhood was \"for the most part healthy. Parents were always together, my mom was a stay-at-home mother and income wise, we were on the lower end of income. Overall healthy until my father  from heart failure about 27 years ago\".  Patient described their current relationships with family of origin as close with immediate family and good relationships with sisters who live in [place].     The patient describes their cultural background as .  Cultural influences and impact on patient's life structure, values, norms, and healthcare: Jewish was present in the home. Chemical dependencywith extended family members.  Contextual influences on patient's health include: Contextual Factors: Health- Seeking Factors liver transplant and alcohol use  " related treatment.    These factors will be addressed in the Preliminary Treatment plan. Patient identified their preferred language to be English. Patient reported they does not need the assistance of an  or other support involved in therapy.     Patient reported had no significant delays in developmental tasks.   Patient's highest education level was some college  .  Patient identified the following learning problems: none reported.  Modifications will not be used to assist communication in therapy. Patient reports they are  able to understand written materials.    Patient reported the following relationship history  once.  Patient's current relationship status is  for 36 years.  Patient identified their sexual orientation as heterosexual.  Patient reported having 3 child(elisa). Patient identified spouse as part of their support system.  Has two grandchildren. Patient identified the quality of these relationships as good.     Patient's current living/housing situation involves staying in own home/apartment.  The immediate members of family and household include Zachariah Novak,Spouse and they report that housing is stable.    Patient is currently disabled.  Patient reports their finances are obtained through SSDI disability; spouse. Patient does not identify finances as a current stressor.      Patient reported that they have not been involved with the legal system.  Patient does not report being under probation/ parole/ jurisdiction. They are not under any current court jurisdiction. .    Patient's Strengths and Limitations:  Patient identified the following strengths or resources that will help them succeed in treatment: commitment to health and well being, friends / good social support, family support, insight and motivation. Things that may interfere with the patient's success in treatment include: none identified.     Assessments:  The following assessments were completed by patient  for this visit:  PHQ2:   PHQ-2 ( 1999 [employer]) 2/16/2023 1/3/2023 11/11/2022 10/4/2022 10/4/2022 9/10/2021 1/25/2021   Q1: Little interest or pleasure in doing things - 0 0 0 0 0 0   Q2: Feeling down, depressed or hopeless - 0 0 0 0 0 1   PHQ-2 Score - 0 0 0 0 0 1   PHQ-2 Total Score (12-17 Years)- Positive if 3 or more points; Administer PHQ-A if positive - - - - - 0 1   Q1: Little interest or pleasure in doing things Not at all Not at all Not at all Not at all - Not at all -   Q2: Feeling down, depressed or hopeless Several days Not at all Not at all Not at all - Not at all -   PHQ-2 Score 1 0 0 0 - 0 -   Some encounter information is confidential and restricted. Go to Review [employer] activity to see all data.     PHQ9:   PHQ-9 SCORE 9/27/2013 9/29/2014 2/23/2021   PHQ-9 Total Score 0 - -   PHQ-9 Total Score MyChart - 0 -   PHQ-9 Total Score - - 7     GAD2: No flowsheet data found.  GAD7:   TAINA-7 SCORE 9/27/2013 9/29/2014 2/23/2021   Total Score 0 - -   Total Score - 2 -   Total Score - - 6     CAGE-AID:   CAGE-AID Total Score 2/16/2023 2/16/2023   Total Score 1 -   Total Score MyChart - 1 (A total score of 2 or greater is considered clinically significant)   Some encounter information is confidential and restricted. Go to Review [employer] activity to see all data.     PROMIS 10-Global Health (all questions and answers displayed):   PROMIS 10 2/16/2023 2/22/2023   In general, would you say your health is: Good Good   In general, would you say your quality of life is: Good Good   In general, how would you rate your physical health? Fair Fair   In general, how would you rate your mental health, including your mood and your ability to think? Good Good   In general, how would you rate your satisfaction with your social activities and relationships? Good Good   In general, please rate how well you carry out your usual social activities and roles Good Good   To what extent are you able to carry out your everyday  physical activities such as walking, climbing stairs, carrying groceries, or moving a chair? Completely Completely   How often have you been bothered by emotional problems such as feeling anxious, depressed or irritable? Sometimes Rarely   How would you rate your fatigue on average? Mild Mild   How would you rate your pain on average?   0 = No Pain  to  10 = Worst Imaginable Pain 3 3   In general, would you say your health is: - 3   In general, would you say your quality of life is: - 3   In general, how would you rate your physical health? - 2   In general, how would you rate your mental health, including your mood and your ability to think? - 3   In general, how would you rate your satisfaction with your social activities and relationships? - 3   In general, please rate how well you carry out your usual social activities and roles. (This includes activities at home, at work and in your community, and responsibilities as a parent, child, spouse, employee, friend, etc.) - 3   To what extent are you able to carry out your everyday physical activities such as walking, climbing stairs, carrying groceries, or moving a chair? - 5   In the past 7 days, how often have you been bothered by emotional problems such as feeling anxious, depressed, or irritable? - 2   In the past 7 days, how would you rate your fatigue on average? - 2   In the past 7 days, how would you rate your pain on average, where 0 means no pain, and 10 means worst imaginable pain? - 3   Global Mental Health Score - 13   Global Physical Health Score - 15   PROMIS TOTAL - SUBSCORES - 28   Some encounter information is confidential and restricted. Go to Review Flowsheets activity to see all data.   Some recent data might be hidden     PROMIS 10-Global Health (only subscores and total score):   PROMIS-10 Scores Only 2/22/2023   Global Mental Health Score 13   Global Physical Health Score 15   PROMIS TOTAL - SUBSCORES 28   Some encounter information is  confidential and restricted. Go to Review Outline App activity to see all data.     Terrell Suicide Severity Rating Scale (Lifetime/Recent)  Terrell Suicide Severity Rating (Lifetime/Recent) 3/28/2019 4/3/2019   Q1 Wished to be Dead (Past Month) no no   Q2 Suicidal Thoughts (Past Month) no no   Q6 Suicide Behavior (Lifetime) - no   Some encounter information is confidential and restricted. Go to Review Outline App activity to see all data.     Terrell Suicide Severity Rating Scale (Short Version)  Terrell Suicide Severity Rating (Short Version) 4/21/2020 4/21/2020 4/21/2020 2/26/2021 2/26/2021 12/31/2022 1/6/2023   Over the past 2 weeks have you felt down, depressed, or hopeless? no no no no yes no no   Over the past 2 weeks have you had thoughts of killing yourself? no no no - no no no   Have you ever attempted to kill yourself? no no no - - no no   Q1 Wished to be Dead (Past Month) - - - - - - -   Q2 Suicidal Thoughts (Past Month) - - - - - - -   Q6 Suicide Behavior (Lifetime) - - - - - - -   Some encounter information is confidential and restricted. Go to Review Outline App activity to see all data.       Personal and Family Medical History:  Patient does report a family history of mental health concerns.  Patient reports family history includes Breast Cancer in her mother; Cancer in her sister; Diabetes in her maternal grandfather and paternal grandmother; Gastrointestinal Disease in her mother; Heart Disease in her paternal grandfather; Heart Disease (age of onset: 57) in her father; Hypertension in her maternal grandmother; Skin Cancer in her sister; Substance Abuse in her maternal grandfather, maternal uncle, and maternal uncle; Suicide in her niece and niece.    Patient does report Mental Health Diagnosis and/or Treatment.  Patient Patient reported the following previous diagnoses which include(s): none reported . 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  Patient has received mental  health services in the past: family therapy and a individual therapist for several sessions over a decade ago. Psychiatric Hospitalizations: none when Patient denies a history of civil commitment.  Currently, patient none  receiving other mental health services.  These include none.       Patient has had a physical exam to rule out medical causes for current symptoms.  Date of last physical exam was within the past year. Client was encouraged to follow up with PCP if symptoms were to develop. The patient has a [facility] Provider, who is named Linda Macdonald Patient reports the following current medical concerns:    Patient denies any issues with pain. Does have Crohn's Disease.  There are not significant appetite / nutritional concerns / weight changes.  Does report some eating when stressed. Does report changes to sleep - states feeling more worrisome and racing thoughts at bedtime Patient does not report a history of head injury / trauma / cognitive impairment.      Current Outpatient Medications   Medication     furosemide (LASIX) 40 MG tablet     inFLIXimab (REMICADE) 100 MG injection     Milk Thistle-Dand-Fennel-Licor (MILK THISTLE XTRA) CAPS capsule     Multiple Vitamins-Minerals (MULTIVITAMIN & MINERAL PO)     spironolactone (ALDACTONE) 100 MG tablet     triamcinolone (KENALOG) 0.1 % external cream     No current facility-administered medications for this visit.     Facility-Administered Medications Ordered in Other Visits   Medication     BREEZA Lemon-Lime flavored oral liquid for Neutral Abdominal/Pelvic Imaging 1,000 mL     hyoscyamine (LEVSIN/SL) sublingual tablet 125 mcg         Medication Adherence:  Patient reports taking prescribed medications as prescribed.    Patient Allergies:    Allergies   Allergen Reactions     Fish Oil      Redness and itching around eye area only - went away when fish oil capsules stopped      Metronidazole      pain/itching     Naphthalenemethylamines       Lamisil = mild urticarial reaction     Ppd [Tuberculin Purified Protein Derivative]      Sulfa Drugs      hives       Medical History:    Past Medical History:   Diagnosis Date     Alcohol abuse      Alcoholic cirrhosis of liver with ascites      Anal fistula      Atypical ductal hyperplasia of breast 09/10/2010    ERT not recommended -left - and flat epithelial atypia-scheduled for breast biopsy 9/17/2010      Bifid uvula      Cholelithiasis      Contact perianal dermatitis and other eczema     recurrent - clobetasol      Crohn disease      Fear of flying      - gets Ativan prn.      HCC (hepatocellular carcinoma) (H) 2/1/2023     Hypertension      IBS (irritable bowel syndrome)          Current Mental Status Exam:   Appearance:  Appropriate    Eye Contact:  Good   Psychomotor:  Normal       Gait / station:  no problem  Attitude / Demeanor: Cooperative   Speech      Rate / Production: Normal/ Responsive      Volume:  Normal  volume      Language:  intact  Mood:   Anxious   Affect:   Appropriate    Thought Content: Clear   Thought Process: Logical       Associations: No loosening of associations  Insight:   Good   Judgment:  Intact   Orientation:  All  Attention/concentration: Good      Substance Use:  Patient did report a family history of substance use concerns; see medical history section for details.  Patient has not received chemical dependency treatment in the past.  Patient has not ever been to detox.      Patient is not currently receiving any chemical dependency treatment.           Substance History of use Age of first use Date of last use     Pattern and duration of use (include amounts and frequency)   Alcohol used in the past   16 12/29/22 REPORTS SUBSTANCE USE: N/A   Cannabis   used in the past 54 01/01/22 REPORTS SUBSTANCE USE: N/A     Amphetamines   never used     REPORTS SUBSTANCE USE: N/A   Cocaine/crack    never used       REPORTS SUBSTANCE USE: N/A   Hallucinogens never used         REPORTS  SUBSTANCE USE: N/A   Inhalants never used         REPORTS SUBSTANCE USE: N/A   Heroin never used         REPORTS SUBSTANCE USE: N/A   Other Opiates never used     REPORTS SUBSTANCE USE: N/A   Benzodiazepine   never used     REPORTS SUBSTANCE USE: N/A   Barbiturates never used     REPORTS SUBSTANCE USE: N/A   Over the counter meds never used     REPORTS SUBSTANCE USE: N/A   Caffeine currently use 15   REPORTS SUBSTANCE USE: N/A   Nicotine  never used     REPORTS SUBSTANCE USE: N/A   Other substances not listed above:  Identify:  never used     REPORTS SUBSTANCE USE: N/A     Patient reported the following problems as a result of their substance use: no problems, not applicable.    Substance Use: No symptoms    Based on the negative CAGE score and clinical interview there  are not indications of drug or alcohol abuse.      Significant Losses / Trauma / Abuse / Neglect Issues:   Patient did not serve in the .  There are indications or report of significant loss, trauma, abuse or neglect issues related to: are no indications and client denies any losses, trauma, abuse, or neglect concerns.  Concerns for possible neglect are not present.     Safety Assessment:   Patient denies current homicidal ideation and behaviors.  Patient denies current self-injurious ideation and behaviors.    Patient denied risk behaviors associated with substance use.  Patient denies any high risk behaviors associated with mental health symptoms.  Patient reports the following current concerns for their personal safety: None.  Patient reports there are firearms in the house.     yes, they are secured. The firearms are secured in a locked space.    History of Safety Concerns:  Patient denied a history of homicidal ideation.     Patient denied a history of personal safety concerns.    Patient denied a history of assaultive behaviors.    Patient denied a history of sexual assault behaviors.     Patient denied a history of risk behaviors  associated with substance use.  Patient denies any history of high risk behaviors associated with mental health symptoms.  Patient reports the following protective factors: dedication to family or friends; safe and stable environment; abstinence from substances; agreement to use safety plan; living with other people; daily obligations; commitment to well being; healthy fear of risky behaviors or pain; sense of personal control or determination    Risk Plan:  See Recommendations for Safety and Risk Management Plan    Review of Symptoms per patient report:   Depression: Change in sleep, Lack of interest, Change in energy level, Change in appetite, Low self-worth, Ruminations and Feeling sad, down, or depressed  Mayte:  No Symptoms  Psychosis: No Symptoms  Anxiety: Excessive worry, Nervousness and Sleep disturbance  Panic:  No symptoms  Post Traumatic Stress Disorder:  No Symptoms   Eating Disorder: No Symptoms  ADD / ADHD:  No symptoms  Conduct Disorder: No symptoms  Autism Spectrum Disorder: No symptoms  Obsessive Compulsive Disorder: No Symptoms    Patient reports the following compulsive behaviors and treatment history: None identified.      Diagnostic Criteria:   Adjustment Disorder  A. The development of emotional or behavioral symptoms in response to an identifiable stressor(s) occurring within 3 months of the onset of the stressor(s)  B. These symptoms or behaviors are clinically significant, as evidenced by one or both of the following:       - Significant impairment in social, occupational, or other important areas of functioning  C. The stress-related disturbance does not meet criteria for another disorder & is not not an exacerbation of another mental disorder  D. The symptoms do not represent normal bereavement  E. Once the stressor or its consequences have terminated, the symptoms do not persist for more than an additional 6 months       * Adjustment Disorder with Mixed Anxiety and Depressed Mood: The  predominant manifestation is a combination of depression and anxiety    Functional Status:  Patient reports the following functional impairments:  chronic disease management and social interactions.     Nonprogrammatic care:  Patient is requesting basic services to address current mental health concerns.    Clinical Summary:  1. Reason for assessment: SOT recommended counseling for support.  2. Psychosocial, Cultural and Contextual Factors: Good social support.  3. Principal DSM5 Diagnoses  (Sustained by DSM5 Criteria Listed Above):   Adjustment Disorders  309.28 (F43.23) With mixed anxiety and depressed mood.  4. Other Diagnoses that is relevant to services:   Substance-Related & Addictive Disorders Alcohol Use Disorder   303.90 (F10.20) Moderate    5. Provisional Diagnosis:  None  6. Prognosis: Expect Improvement.  7. Likely consequences of symptoms if not treated: deterioration of mood and quality of life.  8. Client strengths include:  committed to sobriety, educated, has a previous history of therapy, insightful, intelligent, motivated, open to suggestions / feedback, support of family, friends and providers, wants to learn and willing to relate to others.    Recommendations:     1. Plan for Safety and Risk Management:   Safety and Risk: Recommended that patient call 911 or go to the local ED should there be a change in any of these risk factors..          Report to child / adult protection services was NA.     2. Patient's identified no immediate cultural or diversity related matters.     3. Initial Treatment will focus on:    Adjustment Difficulties related to: health concerns and SOT engagement.     4. Resources/Service Plan:    services are not indicated.   Modifications to assist communication are not indicated.   Additional disability accommodations are not indicated.      5. Collaboration:   Collaboration / coordination of treatment will be initiated with the following  support professionals:  SOT care team.      6.  Referrals:   The following referral(s) will be initiated: None. Next Scheduled Appointment: continue with [facility].      A Release of Information has been obtained for the following: None.     Emergency Contact was not obtained.      Clinical Substantiation/medical necessity for the above recommendations:  1:1 counseling recommended for helping patient learn adaptive skills in sobriety, receive additional support during transplant process. Without service, there is a stronger risk of relapse with alcohol and negative implications for transplant status.    7. RENEE:    RENEE:  Discussed the general effects of drugs and alcohol on health and well-being and Attend a sober community support program including AA, [facility] Recovery, [facility], etc.)..Provider gave patient printed information about the  effects of chemical use on their health and well being. Recommendations:  Follow all recommendations from CD evaluation; see EHR for details.     8. Records:   These were reviewed at time of assessment.   Information in this assessment was obtained from the medical record and provided by patient who is a good historian. Patient will have open access to their mental health medical record.    9.   Interactive Complexity: No      Provider Name/ Credentials:  Patel Dumont LP  February 23, 2023

## 2023-02-24 ENCOUNTER — TELEPHONE (OUTPATIENT)
Dept: OTHER | Age: 58
End: 2023-02-24

## 2023-02-24 NOTE — TELEPHONE ENCOUNTER
Hello,  I'm with ortho con.  Cracked and peeling feet is not listed on our diagnosis grid.  Who would be appropriate for this pt?  Podiatry, sports med?  Thank you.

## 2023-02-24 NOTE — TELEPHONE ENCOUNTER
Please see request below.     See 2/14/23 office visit note from Dr. JOSLYN Cervantes:  LE cellulitis due to skin cracks in feet. Have asked her to see podiatry for skin care recommendations.  Patient was seen in the ED for cellulitis on 12/31/22. She is being worked up for a liver transplant.      Please advise if it is appropriate for patient to see Podiatry or if they should be seeing PCP or Dermatology.      DARY Anderson RN

## 2023-02-27 ENCOUNTER — HOSPITAL ENCOUNTER (OUTPATIENT)
Facility: CLINIC | Age: 58
Discharge: HOME | End: 2023-02-27
Attending: RADIOLOGY | Admitting: RADIOLOGY
Payer: COMMERCIAL

## 2023-02-27 ENCOUNTER — MEDICAL CORRESPONDENCE (OUTPATIENT)
Dept: HEALTH INFORMATION MANAGEMENT | Facility: CLINIC | Age: 58
End: 2023-02-27
Payer: MEDICARE

## 2023-02-27 ENCOUNTER — HOSPITAL ENCOUNTER (OUTPATIENT)
Dept: NUCLEAR MEDICINE | Facility: CLINIC | Age: 58
Setting detail: NUCLEAR MEDICINE
Discharge: HOME | End: 2023-02-27
Attending: RADIOLOGY
Payer: COMMERCIAL

## 2023-02-27 ENCOUNTER — APPOINTMENT (OUTPATIENT)
Dept: MEDSURG UNIT | Facility: CLINIC | Age: 58
End: 2023-02-27
Attending: RADIOLOGY
Payer: COMMERCIAL

## 2023-02-27 ENCOUNTER — APPOINTMENT (OUTPATIENT)
Dept: INTERVENTIONAL RADIOLOGY/VASCULAR | Facility: CLINIC | Age: 58
End: 2023-02-27
Attending: RADIOLOGY
Payer: COMMERCIAL

## 2023-02-27 ENCOUNTER — LAB (OUTPATIENT)
Dept: LAB | Facility: CLINIC | Age: 58
End: 2023-02-27
Payer: COMMERCIAL

## 2023-02-27 VITALS
TEMPERATURE: 99 F | RESPIRATION RATE: 18 BRPM | SYSTOLIC BLOOD PRESSURE: 136 MMHG | OXYGEN SATURATION: 98 % | DIASTOLIC BLOOD PRESSURE: 88 MMHG | HEART RATE: 74 BPM

## 2023-02-27 DIAGNOSIS — K50.113 CROHN'S DISEASE OF LARGE INTESTINE WITH FISTULA (H): ICD-10-CM

## 2023-02-27 DIAGNOSIS — C22.0 HCC (HEPATOCELLULAR CARCINOMA) (H): ICD-10-CM

## 2023-02-27 DIAGNOSIS — K70.31 ALCOHOLIC CIRRHOSIS OF LIVER WITH ASCITES (H): Primary | ICD-10-CM

## 2023-02-27 LAB
ALBUMIN SERPL BCG-MCNC: 3.7 G/DL (ref 3.5–5.2)
ALP SERPL-CCNC: 104 U/L (ref 35–104)
ALT SERPL W P-5'-P-CCNC: 33 U/L (ref 10–35)
AST SERPL W P-5'-P-CCNC: 57 U/L (ref 10–35)
BILIRUB DIRECT SERPL-MCNC: 0.76 MG/DL (ref 0–0.3)
BILIRUB SERPL-MCNC: 2.1 MG/DL
PROT SERPL-MCNC: 7.6 G/DL (ref 6.4–8.3)

## 2023-02-27 PROCEDURE — 80230 DRUG ASSAY INFLIXIMAB: CPT

## 2023-02-27 PROCEDURE — C2616 BRACHYTX, NON-STR,YTTRIUM-90: HCPCS | Performed by: RADIOLOGY

## 2023-02-27 PROCEDURE — 76937 US GUIDE VASCULAR ACCESS: CPT | Mod: 26 | Performed by: RADIOLOGY

## 2023-02-27 PROCEDURE — C1887 CATHETER, GUIDING: HCPCS

## 2023-02-27 PROCEDURE — C1769 GUIDE WIRE: HCPCS

## 2023-02-27 PROCEDURE — C9113 INJ PANTOPRAZOLE SODIUM, VIA: HCPCS | Performed by: NURSE PRACTITIONER

## 2023-02-27 PROCEDURE — 258N000003 HC RX IP 258 OP 636: Performed by: NURSE PRACTITIONER

## 2023-02-27 PROCEDURE — 82040 ASSAY OF SERUM ALBUMIN: CPT | Performed by: NURSE PRACTITIONER

## 2023-02-27 PROCEDURE — 36415 COLL VENOUS BLD VENIPUNCTURE: CPT | Performed by: NURSE PRACTITIONER

## 2023-02-27 PROCEDURE — 250N000011 HC RX IP 250 OP 636: Performed by: NURSE PRACTITIONER

## 2023-02-27 PROCEDURE — 75726 ARTERY X-RAYS ABDOMEN: CPT | Mod: 26 | Performed by: RADIOLOGY

## 2023-02-27 PROCEDURE — 77300 RADIATION THERAPY DOSE PLAN: CPT | Mod: 26 | Performed by: RADIOLOGY

## 2023-02-27 PROCEDURE — 255N000002 HC RX 255 OP 636: Performed by: RADIOLOGY

## 2023-02-27 PROCEDURE — 250N000009 HC RX 250: Performed by: STUDENT IN AN ORGANIZED HEALTH CARE EDUCATION/TRAINING PROGRAM

## 2023-02-27 PROCEDURE — 999N000142 HC STATISTIC PROCEDURE PREP ONLY

## 2023-02-27 PROCEDURE — 250N000013 HC RX MED GY IP 250 OP 250 PS 637: Performed by: RADIOLOGY

## 2023-02-27 PROCEDURE — 272N000566 HC SHEATH CR3

## 2023-02-27 PROCEDURE — 78800 RP LOCLZJ TUM 1 AREA 1 D IMG: CPT

## 2023-02-27 PROCEDURE — 76937 US GUIDE VASCULAR ACCESS: CPT

## 2023-02-27 PROCEDURE — 77263 THER RADIOLOGY TX PLNG CPLX: CPT | Mod: 26 | Performed by: RADIOLOGY

## 2023-02-27 PROCEDURE — 75726 ARTERY X-RAYS ABDOMEN: CPT | Mod: XU

## 2023-02-27 PROCEDURE — 250N000011 HC RX IP 250 OP 636: Performed by: STUDENT IN AN ORGANIZED HEALTH CARE EDUCATION/TRAINING PROGRAM

## 2023-02-27 PROCEDURE — 36247 INS CATH ABD/L-EXT ART 3RD: CPT | Mod: GC | Performed by: RADIOLOGY

## 2023-02-27 PROCEDURE — 272N000504 HC NEEDLE CR4

## 2023-02-27 PROCEDURE — 36415 COLL VENOUS BLD VENIPUNCTURE: CPT

## 2023-02-27 PROCEDURE — 278N000001 HC RX 278: Performed by: RADIOLOGY

## 2023-02-27 PROCEDURE — 272N000143 HC KIT CR3

## 2023-02-27 PROCEDURE — 36247 INS CATH ABD/L-EXT ART 3RD: CPT

## 2023-02-27 PROCEDURE — 78800 RP LOCLZJ TUM 1 AREA 1 D IMG: CPT | Mod: 26 | Performed by: RADIOLOGY

## 2023-02-27 PROCEDURE — 99152 MOD SED SAME PHYS/QHP 5/>YRS: CPT

## 2023-02-27 PROCEDURE — 37243 VASC EMBOLIZE/OCCLUDE ORGAN: CPT

## 2023-02-27 PROCEDURE — 999N000134 HC STATISTIC PP CARE STAGE 3

## 2023-02-27 PROCEDURE — 75774 ARTERY X-RAY EACH VESSEL: CPT | Mod: XU

## 2023-02-27 RX ORDER — AMPICILLIN AND SULBACTAM 2; 1 G/1; G/1
3 INJECTION, POWDER, FOR SOLUTION INTRAMUSCULAR; INTRAVENOUS
Status: COMPLETED | OUTPATIENT
Start: 2023-02-27 | End: 2023-02-27

## 2023-02-27 RX ORDER — SODIUM CHLORIDE 9 MG/ML
INJECTION, SOLUTION INTRAVENOUS CONTINUOUS
Status: DISCONTINUED | OUTPATIENT
Start: 2023-02-27 | End: 2023-02-27 | Stop reason: HOSPADM

## 2023-02-27 RX ORDER — IODIXANOL 320 MG/ML
100 INJECTION, SOLUTION INTRAVASCULAR ONCE
Status: COMPLETED | OUTPATIENT
Start: 2023-02-27 | End: 2023-02-27

## 2023-02-27 RX ORDER — OXYCODONE HYDROCHLORIDE 5 MG/1
5-10 TABLET ORAL EVERY 6 HOURS PRN
Qty: 10 TABLET | Refills: 0 | Status: SHIPPED | OUTPATIENT
Start: 2023-02-27 | End: 2023-06-13

## 2023-02-27 RX ORDER — ONDANSETRON 4 MG/1
4-8 TABLET, FILM COATED ORAL EVERY 6 HOURS PRN
Qty: 40 TABLET | Refills: 0 | Status: SHIPPED | OUTPATIENT
Start: 2023-02-27 | End: 2023-06-13

## 2023-02-27 RX ORDER — NALOXONE HYDROCHLORIDE 0.4 MG/ML
0.4 INJECTION, SOLUTION INTRAMUSCULAR; INTRAVENOUS; SUBCUTANEOUS
Status: DISCONTINUED | OUTPATIENT
Start: 2023-02-27 | End: 2023-02-27 | Stop reason: HOSPADM

## 2023-02-27 RX ORDER — ACETAMINOPHEN 325 MG/1
650 TABLET ORAL EVERY 4 HOURS PRN
Status: DISCONTINUED | OUTPATIENT
Start: 2023-02-27 | End: 2023-02-27 | Stop reason: HOSPADM

## 2023-02-27 RX ORDER — CLOTRIMAZOLE 1 %
1 CREAM WITH APPLICATOR VAGINAL AT BEDTIME
Qty: 1 G | Refills: 0 | Status: SHIPPED | OUTPATIENT
Start: 2023-02-27 | End: 2023-03-19

## 2023-02-27 RX ORDER — CIPROFLOXACIN 500 MG/1
500 TABLET, FILM COATED ORAL DAILY
Qty: 14 TABLET | Refills: 0 | Status: SHIPPED | OUTPATIENT
Start: 2023-02-27 | End: 2023-03-13

## 2023-02-27 RX ORDER — OXYCODONE HYDROCHLORIDE 5 MG/1
5 TABLET ORAL ONCE
Status: COMPLETED | OUTPATIENT
Start: 2023-02-27 | End: 2023-02-27

## 2023-02-27 RX ORDER — ONDANSETRON 2 MG/ML
4 INJECTION INTRAMUSCULAR; INTRAVENOUS ONCE
Status: COMPLETED | OUTPATIENT
Start: 2023-02-27 | End: 2023-02-27

## 2023-02-27 RX ORDER — HYDROMORPHONE HYDROCHLORIDE 2 MG/1
2 TABLET ORAL EVERY 4 HOURS PRN
Status: DISCONTINUED | OUTPATIENT
Start: 2023-02-27 | End: 2023-02-27 | Stop reason: HOSPADM

## 2023-02-27 RX ORDER — NALOXONE HYDROCHLORIDE 0.4 MG/ML
0.2 INJECTION, SOLUTION INTRAMUSCULAR; INTRAVENOUS; SUBCUTANEOUS
Status: DISCONTINUED | OUTPATIENT
Start: 2023-02-27 | End: 2023-02-27 | Stop reason: HOSPADM

## 2023-02-27 RX ORDER — LIDOCAINE 40 MG/G
CREAM TOPICAL
Status: DISCONTINUED | OUTPATIENT
Start: 2023-02-27 | End: 2023-02-27 | Stop reason: HOSPADM

## 2023-02-27 RX ORDER — PANTOPRAZOLE SODIUM 40 MG/1
40 TABLET, DELAYED RELEASE ORAL DAILY
Qty: 30 TABLET | Refills: 0 | Status: SHIPPED | OUTPATIENT
Start: 2023-02-27 | End: 2023-06-13

## 2023-02-27 RX ORDER — FLUMAZENIL 0.1 MG/ML
0.2 INJECTION, SOLUTION INTRAVENOUS
Status: DISCONTINUED | OUTPATIENT
Start: 2023-02-27 | End: 2023-02-27 | Stop reason: HOSPADM

## 2023-02-27 RX ORDER — HYDROMORPHONE HYDROCHLORIDE 4 MG/1
4 TABLET ORAL EVERY 4 HOURS PRN
Status: DISCONTINUED | OUTPATIENT
Start: 2023-02-27 | End: 2023-02-27 | Stop reason: HOSPADM

## 2023-02-27 RX ORDER — FENTANYL CITRATE 50 UG/ML
25-50 INJECTION, SOLUTION INTRAMUSCULAR; INTRAVENOUS EVERY 5 MIN PRN
Status: DISCONTINUED | OUTPATIENT
Start: 2023-02-27 | End: 2023-02-27 | Stop reason: HOSPADM

## 2023-02-27 RX ORDER — METRONIDAZOLE 250 MG/1
250 TABLET ORAL 2 TIMES DAILY
Qty: 28 TABLET | Refills: 0 | Status: SHIPPED | OUTPATIENT
Start: 2023-02-27 | End: 2023-03-13

## 2023-02-27 RX ORDER — METHYLPREDNISOLONE 4 MG
TABLET, DOSE PACK ORAL
Qty: 21 TABLET | Refills: 0 | Status: SHIPPED | OUTPATIENT
Start: 2023-02-27 | End: 2023-06-13

## 2023-02-27 RX ADMIN — OXYCODONE HYDROCHLORIDE 5 MG: 5 TABLET ORAL at 16:51

## 2023-02-27 RX ADMIN — FENTANYL CITRATE 50 MCG: 50 INJECTION, SOLUTION INTRAMUSCULAR; INTRAVENOUS at 14:17

## 2023-02-27 RX ADMIN — LIDOCAINE HYDROCHLORIDE 7 ML: 10 INJECTION, SOLUTION EPIDURAL; INFILTRATION; INTRACAUDAL; PERINEURAL at 14:45

## 2023-02-27 RX ADMIN — MIDAZOLAM 1 MG: 1 INJECTION INTRAMUSCULAR; INTRAVENOUS at 14:24

## 2023-02-27 RX ADMIN — MIDAZOLAM 0.5 MG: 1 INJECTION INTRAMUSCULAR; INTRAVENOUS at 14:58

## 2023-02-27 RX ADMIN — FENTANYL CITRATE 50 MCG: 50 INJECTION, SOLUTION INTRAMUSCULAR; INTRAVENOUS at 15:29

## 2023-02-27 RX ADMIN — MIDAZOLAM 0.5 MG: 1 INJECTION INTRAMUSCULAR; INTRAVENOUS at 14:32

## 2023-02-27 RX ADMIN — FENTANYL CITRATE 25 MCG: 50 INJECTION, SOLUTION INTRAMUSCULAR; INTRAVENOUS at 14:32

## 2023-02-27 RX ADMIN — PANTOPRAZOLE SODIUM 40 MG: 40 INJECTION, POWDER, FOR SOLUTION INTRAVENOUS at 12:40

## 2023-02-27 RX ADMIN — FENTANYL CITRATE 25 MCG: 50 INJECTION, SOLUTION INTRAMUSCULAR; INTRAVENOUS at 14:58

## 2023-02-27 RX ADMIN — IODIXANOL 40 ML: 320 INJECTION, SOLUTION INTRAVASCULAR at 15:42

## 2023-02-27 RX ADMIN — FENTANYL CITRATE 50 MCG: 50 INJECTION, SOLUTION INTRAMUSCULAR; INTRAVENOUS at 14:48

## 2023-02-27 RX ADMIN — ONDANSETRON 4 MG: 2 INJECTION INTRAMUSCULAR; INTRAVENOUS at 14:12

## 2023-02-27 RX ADMIN — AMPICILLIN SODIUM AND SULBACTAM SODIUM 3 G: 2; 1 INJECTION, POWDER, FOR SOLUTION INTRAMUSCULAR; INTRAVENOUS at 12:30

## 2023-02-27 RX ADMIN — FENTANYL CITRATE 50 MCG: 50 INJECTION, SOLUTION INTRAMUSCULAR; INTRAVENOUS at 14:24

## 2023-02-27 RX ADMIN — MIDAZOLAM 1 MG: 1 INJECTION INTRAMUSCULAR; INTRAVENOUS at 14:48

## 2023-02-27 RX ADMIN — HYDROCORTISONE SODIUM SUCCINATE 100 MG: 100 INJECTION, POWDER, FOR SOLUTION INTRAMUSCULAR; INTRAVENOUS at 12:31

## 2023-02-27 RX ADMIN — Medication 22 MILLICURIE: at 16:48

## 2023-02-27 RX ADMIN — OXYCODONE HYDROCHLORIDE 5 MG: 5 TABLET ORAL at 17:43

## 2023-02-27 RX ADMIN — MIDAZOLAM 1 MG: 1 INJECTION INTRAMUSCULAR; INTRAVENOUS at 14:17

## 2023-02-27 RX ADMIN — SODIUM CHLORIDE: 9 INJECTION, SOLUTION INTRAVENOUS at 12:30

## 2023-02-27 ASSESSMENT — ACTIVITIES OF DAILY LIVING (ADL)
ADLS_ACUITY_SCORE: 35

## 2023-02-27 NOTE — PROGRESS NOTES
Pt here for y-90 delivery. Ready for procedure save labs.  Albino will give ride home, at bedside.

## 2023-02-27 NOTE — PROGRESS NOTES
1645 Pt returned to 2a post nuclear med study. Right groin remains unchanged. Pt experiencing pain at site, received order for oxycodone 5mg, given at this time. Pt eating and drinking. Family at bedside. Rx sent to discharge pharmacy.

## 2023-02-27 NOTE — IR NOTE
Patient Name: Abiola Matute  Medical Record Number: [id number]  Today's Date: 2/27/2023     Procedure: Y90 Delivery   Proceduralist: Dr. Shah and Dr. Rueda   Pathology present: NA        Procedure Start: 1416  Procedure end: 1535  Sedation medications administered: Midazolam 4 mg, Fentanyl 250 mcg       Report given to: 2A RN   : ESTHER  Other Notes: Pt arrived to IR room 1 from . Consent reviewed. Pt denies any questions or concerns regarding procedure. Pt positioned supine and monitored per protocol. Pt tolerated procedure without any noted complications. Pt transferred back to .    Sheath removed at 1528 Manual pressure held for 10 minutes;  Angioseal Closure device deployed 1528    Groin site appearance: soft/intact.  Pt reports discomfort to site; MD  aware and will assess at bedside.   Pedal pulse assessment:  WDL; +2   Bedrest for 2 hours.

## 2023-02-27 NOTE — PRE-PROCEDURE
GENERAL PRE-PROCEDURE:   Procedure:  Y90 delivery  Date/Time:  2/27/2023 12:22 PM    Written consent obtained?: Yes    Risks and benefits: Risks, benefits and alternatives were discussed    Consent given by:  Patient  Patient states understanding of procedure being performed: Yes    Patient's understanding of procedure matches consent: Yes    Procedure consent matches procedure scheduled: Yes    Expected level of sedation:  Moderate  Appropriately NPO:  Yes  ASA Class:  3  Mallampati  :  Grade 2- soft palate, base of uvula, tonsillar pillars, and portion of posterior pharyngeal wall visible  Lungs:  Lungs clear with good breath sounds bilaterally  Heart:  Normal heart sounds and rate  History & Physical reviewed:  History and physical reviewed and no updates needed  Statement of review:  I have reviewed the lab findings, diagnostic data, medications, and the plan for sedation

## 2023-02-27 NOTE — DISCHARGE INSTRUCTIONS
[facility]   Interventional Radiology  Discharge Instructions Post Angiography for Insertion of   Radioactive Microspheres to the Liver    AFTER YOU GO HOME:        Relax and take it easy for 24 hours.       Drink plenty of fluids.       Resume your regular diet, unless otherwise instructed by your Primary Physician.       DO NOT smoke for at least 24 hours, if you were given any sedation.       DO NOT drink alcoholic beverages the day of your procedure.       DO NOT drive or operate machinery at home or at work for 24 hours.          DO NOT make any important or legal decisions for 24 hours following your procedure.       DO NOT take a shower for at least 12 hours following your procedure. No tub bath, hot tubs, or swimming for 5 days      Care of groin site  It is normal to have a small bruise or lump at the site.  For the first 2 days, when you cough, sneeze or move your bowels, hold your hand over the puncture site and press gently.  Do NOT lift more than 10 pounds or do any strenuous exercise for at least 3 to 5 days.  Do not use lotion or powder near the puncture site for 3 days.  If you start bleeding from the site in your groin: lie down flat and press firmly on the site. Call your doctor as soon as you can.   Call 911 right away if you have: Heavy bleeding, bleeding that does not stop.    CALL THE PHYSICIAN IF:       - You start bleeding from the procedure site. A small lump or bruise is common at the puncture site. Your physician will tell you if you need to return to the hospital.      - You develop numbness, coolness or a change in color of the leg that was punctured.      - You experience increased pain or redness at the puncture site.      - You develop hives or a rash or unexplained itching.      - You develop a temperature of 101 degrees F or greater.    Additional Information:         Follow the Discharge Instructions for the Liver Brachytherapy; Contrast Instructions and  Instructions for the [employer]arm[employer].     Closure Device: Angioseal Device          [facility] INTERVENTIONAL RADIOLOGY DEPARTMENT         Procedure Physician:Dr. Diaz          Date of Procedure:February 27, 2023       Telephone Numbers: [phone]      Monday-Friday 7:30 am to 4:00 pm                                         [phone]       After 4:00 pm Monday-Friday, Weekend and Holidays. Ask for the Interventional Radiologist on call. Someone is on call 24 hrs/day.

## 2023-02-27 NOTE — TELEPHONE ENCOUNTER
Agree, either podiatry or derm.  If there was not concern for infection, I would favor derm.    It looks like the patient is being admitted today.   Perhaps they will place an inpatient consult, if needed.    Dr. Leal

## 2023-02-27 NOTE — PROGRESS NOTES
Pt back from Y90 Delivery.  VSS.  Pt reports tenderness at R groin site/R pelvis.  Small hematoma present.  Provider notified and saw pt at bedside.  Instructed  RN to continue monitoring.  R groin access site closed with angioseal. Scant old drainage on tegaderm dressing.

## 2023-02-27 NOTE — PROGRESS NOTES
"Dr Diaz in to talk to pt and her , they were both updated on the procedure and what to expect when they go home.  Dr Diaz did check the right groin, she agrees with nursing that there is a small hematoma.  Dr MONTERROSO\"Nora did not want pressure applied, will continue to monitor site for change in hematoma.   "

## 2023-02-28 NOTE — PROGRESS NOTES
1815 Pt tolerated oral intake and ambulation. Voided. Right groin remains unchanged after ambulation. Pain managed with oxycodone 10mg oral. Discharge instructions reviewed with pt and pt's , copy given to pt. Rx picked up at discharge pharmacy. Nuclear med study completed. Pt states she is ready to discharge home. PIV dc'd. Pt will discharge accompanied by .

## 2023-03-02 ENCOUNTER — TELEPHONE (OUTPATIENT)
Dept: RADIOLOGY | Facility: CLINIC | Age: 58
End: 2023-03-02
Payer: MEDICARE

## 2023-03-02 DIAGNOSIS — C22.0 HCC (HEPATOCELLULAR CARCINOMA) (H): Primary | ICD-10-CM

## 2023-03-02 NOTE — TELEPHONE ENCOUNTER
Called and left a VM for [place] to discuss her delivery procedure on 2/27. Provided patient with direct office line to return call.     Nunu CONROY RN, BSN   Interventional Radiology [facility]   [phone]

## 2023-03-06 ENCOUNTER — PATIENT OUTREACH (OUTPATIENT)
Dept: GASTROENTEROLOGY | Facility: CLINIC | Age: 58
End: 2023-03-06
Payer: MEDICARE

## 2023-03-06 LAB
INFLIXIMAB AB SERPL IA-MCNC: <3.1 U/ML
INFLIXIMAB SERPL-MCNC: >34 UG/ML

## 2023-03-06 NOTE — PROGRESS NOTES
[phone], she states the blood is still stable to run the predict pk. She just need the requisition form.    Faxed form and scanned into chart new [facility] form.

## 2023-03-07 ENCOUNTER — TELEPHONE (OUTPATIENT)
Dept: BEHAVIORAL HEALTH | Facility: CLINIC | Age: 58
End: 2023-03-07
Payer: MEDICARE

## 2023-03-07 ENCOUNTER — MEDICAL CORRESPONDENCE (OUTPATIENT)
Dept: HEALTH INFORMATION MANAGEMENT | Facility: CLINIC | Age: 58
End: 2023-03-07
Payer: MEDICARE

## 2023-03-07 NOTE — TELEPHONE ENCOUNTER
Spoke to client regarding wait time for [facility], she is aware writer will call to schedule an intake when a spot opens

## 2023-03-09 ENCOUNTER — TELEPHONE (OUTPATIENT)
Dept: RADIOLOGY | Facility: CLINIC | Age: 58
End: 2023-03-09

## 2023-03-09 ENCOUNTER — OFFICE VISIT (OUTPATIENT)
Dept: PODIATRY | Facility: CLINIC | Age: 58
End: 2023-03-09
Payer: COMMERCIAL

## 2023-03-09 VITALS — SYSTOLIC BLOOD PRESSURE: 130 MMHG | DIASTOLIC BLOOD PRESSURE: 80 MMHG

## 2023-03-09 DIAGNOSIS — M79.672 HEEL PAIN, BILATERAL: ICD-10-CM

## 2023-03-09 DIAGNOSIS — R23.4 FISSURE IN SKIN OF BOTH FEET: ICD-10-CM

## 2023-03-09 DIAGNOSIS — M79.671 HEEL PAIN, BILATERAL: ICD-10-CM

## 2023-03-09 DIAGNOSIS — L85.3 DRY SKIN: Primary | ICD-10-CM

## 2023-03-09 PROCEDURE — 99203 OFFICE O/P NEW LOW 30 MIN: CPT | Performed by: PODIATRIST

## 2023-03-09 RX ORDER — AMMONIUM LACTATE 12 G/100G
CREAM TOPICAL 2 TIMES DAILY
Qty: 385 G | Refills: 3 | Status: SHIPPED | OUTPATIENT
Start: 2023-03-09 | End: 2024-06-30

## 2023-03-09 NOTE — LETTER
"    3/9/2023         RE: Abiola Matute  [address] [address]        Dear Colleague,    Thank you for referring your patient, Abiola Matute, to the [facility] PODIATRY. Please see a copy of my visit note below.    ASSESSMENT:  Encounter Diagnoses   Name Primary?     Dry skin of heels Yes     Fissure in skin of both feet      Heel pain, bilateral      MEDICAL DECISION MAKING:  We discussed the cause and nature of dry skin issues around the heels.    Recommendations:  Dry skin and cracks (foot fissures):      Skin cracks can be cleaned, dried and then glued with New Skin  Monitor for increasing pain and redness which might be the beginning of an infection.  Moisturize with AmLactin cream once or twice daily -this was prescribed  Avoid excessive soaking of feet.  Feet should be moisturized immediately after water submersion  You can gently file the thick skin down with a pumice stone.    (L85.3) Dry skin of heels  (primary encounter diagnosis)  Plan: ammonium lactate (AMLACTIN) 12 % external cream    If the problem is not responding, please consider following up with dermatology.  There are other topical and even prescription medications.      Disclaimer: This note consists of symbols derived from keyboarding, dictation and/or voice recognition software. As a result, there may be errors in the script that have gone undetected. Please consider this when interpreting information found in this chart.    Ezio Leal, JI, FACFAS, [facility] Department of Podiatry/Foot & Ankle Surgery      ____________________________________________________________________    HPI:       Virginia presents reporting \"cracked, dry heels and feet \"  She is dealt with this for 6 months  She feels discomfort in terms of some throbbing and stiffness on the bottom of the heels and feet  Discomfort daily  This is worse when not using moisturizer with weightbearing activities  She is established " with a dermatologist    *  Past Medical History:   Diagnosis Date     Alcohol abuse      Alcoholic cirrhosis of liver with ascites      Anal fistula      Atypical ductal hyperplasia of breast 09/10/2010    ERT not recommended -left - and flat epithelial atypia-scheduled for breast biopsy 9/17/2010      Bifid uvula      Cholelithiasis      Contact perianal dermatitis and other eczema     recurrent - clobetasol      Crohn disease      Fear of flying      - gets Ativan prn.      HCC (hepatocellular carcinoma) (H) 2/1/2023     Hypertension      IBS (irritable bowel syndrome)    *  *  Past Surgical History:   Procedure Laterality Date     BIOPSY ANAL N/A 3/28/2019    anal biopsy and culure placement of seton - Dr Fleming     BIOPSY BREAST Left 09/17/2010    - scheduled with Dr. Varma      BIOPSY LIVER  2019     COLONOSCOPY  2006     COLONOSCOPY N/A 12/23/2014    Procedure: COMBINED COLONOSCOPY, SINGLE OR MULTIPLE BIOPSY/POLYPECTOMY BY BIOPSY;  Surgeon: Diane Fleming MD;  Location:  GI     COLONOSCOPY N/A 12/5/2019    Procedure: COLONOSCOPY, WITH POLYPECTOMY AND BIOPSY;  Surgeon: Farhan Schilling MD;  Location:  GI     ENDOSCOPIC RETROGRADE CHOLANGIOPANCREATOGRAM N/A 4/24/2020    Procedure: ENDOSCOPIC RETROGRADE CHOLANGIOPANCREATOGRAPHY WITH, sledge removal,sphincterotomy, stent in gallbladder and pancreatic duct stent, and balloon dilation;  Surgeon: Guru Isac Kraft MD;  Location:  OR     ESOPHAGOSCOPY, GASTROSCOPY, DUODENOSCOPY (EGD), COMBINED N/A 12/5/2019    Procedure: ESOPHAGOGASTRODUODENOSCOPY (EGD);  Surgeon: Farhan Schilling MD;  Location:  GI     EXAM UNDER ANESTHESIA ANUS N/A 3/28/2019    Procedure: EXAM UNDER ANESTHESIA ANUS;  Surgeon: Diane Fleming MD;  Location:  OR     EXAM UNDER ANESTHESIA ANUS N/A 2/26/2021    Procedure: EXAM UNDER ANESTHESIA OF ANUS, SETON PLACEMENT, EXCISION OF SKIN BRIDGE;  Surgeon: Avtar Nam MD;  Location: [facility]      EXAM UNDER ANESTHESIA ANUS N/A 1/6/2023    Procedure: EXAM UNDER ANESTHESIA, ANUS, SETON EXCHANGE, partial fistulotomy;  Surgeon: Mary Dugan MD;  Location: [facility] OR     HYSTERECTOMY, VAGINAL  2006    with Dr. Licha Zhou - with BSO for fibroids      IR GALLBLADDER DRAIN PLACEMENT  4/22/2020     OPEN REDUCTION INTERNAL FIXATION ANKLE Left at age 27    plates and screws removed at age 36     Pelviscopy with removal of bilateral hydrosalpinges.  04/15/2010     ZZC APPENDECTOMY  at age 14   *  *  Current Outpatient Medications   Medication Sig Dispense Refill     ciprofloxacin (CIPRO) 500 MG tablet Take 1 tablet (500 mg) by mouth daily for 14 days Take at least 2 hrs before or 4 hrs after aluminum, calcium, iron, zinc or magnesium containing products. 14 tablet 0     clotrimazole (LOTRIMIN) 1 % vaginal cream Place 1 Applicatorful vaginally At Bedtime for 20 days 1 g 0     furosemide (LASIX) 40 MG tablet TAKE 1 TABLET BY MOUTH  DAILY (Patient taking differently: Take 40 mg by mouth every morning) 90 tablet 3     inFLIXimab (REMICADE) 100 MG injection Inject into the vein once Next dose 12/16/22       methylPREDNISolone (MEDROL DOSEPAK) 4 MG tablet therapy pack Take as directed on package. 21 tablet 0     metroNIDAZOLE (FLAGYL) 250 MG tablet Take 1 tablet (250 mg) by mouth 2 times daily for 14 days 28 tablet 0     Milk Thistle-Dand-Fennel-Licor (MILK THISTLE XTRA) CAPS capsule Take 1 capsule by mouth 2 times daily (Patient not taking: Reported on 2/17/2023)       Multiple Vitamins-Minerals (MULTIVITAMIN & MINERAL PO) Take by mouth every morning       ondansetron (ZOFRAN) 4 MG tablet Take 1-2 tablets (4-8 mg) by mouth every 6 hours as needed for nausea (vomiting) 40 tablet 0     oxyCODONE (ROXICODONE) 5 MG tablet Take 1-2 tablets (5-10 mg) by mouth every 6 hours as needed for moderate to severe pain 10 tablet 0     pantoprazole (PROTONIX) 40 MG EC tablet Take 1 tablet (40 mg) by mouth daily 30 tablet 0      spironolactone (ALDACTONE) 100 MG tablet TAKE 1 TABLET BY MOUTH  DAILY (Patient taking differently: Take 100 mg by mouth every morning) 90 tablet 3     triamcinolone (KENALOG) 0.1 % external cream Apply to AA BID x 1-2 week then PRN only 80 g 3         EXAM:    Vitals: LMP 05/31/2006   BMI: There is no height or weight on file to calculate BMI.    Constitutional:  Abiola Matute is in no apparent distress, appears well-nourished.  Cooperative with history and physical exam.    Vascular:  Pedal pulses are palpable for both the DP and PT arteries.  CFT < 3 sec.  No edema.      Neuro: Light touch sensation is intact to the L4, L5, S1 distributions  No evidence of weakness, spasticity, or contracture in the lower extremities.     Derm: No erythema, ecchymosis, or cyanosis.  No open lesions.   Generalized xerosis and hyperkeratosis involving the bilateral plantar heel.  There are some vertical superficial fissures on the posterior aspect of the heels.    Musculoskeletal:    Lower extremity muscle strength is normal. No gross deformities.           Again, thank you for allowing me to participate in the care of your patient.        Sincerely,        Ezio Leal DPM

## 2023-03-09 NOTE — TELEPHONE ENCOUNTER
Called patient to discuss scheduling her follow up visits with Dr. Diaz. No answer at this time, provided patient with my direct office line.     Nunu CONROY RN, BSN   Interventional Radiology [facility]   [phone]

## 2023-03-09 NOTE — PATIENT INSTRUCTIONS
Thank you for choosing [facility]!    DR. REESE'S CLINIC LOCATIONS:     [facility] TRIAGE LINE: [phone]   [address] [place] [address] APPOINTMENTS: [phone]   [place] [address] RADIOLOGY: [phone]   (Every other Tues - Wed - Fri PM) SET UP SURGERY: [phone]    PHYSICAL THERAPY: [phone]   [facility] BILLING QUESTIONS: [phone] [address] FAX: [phone]   [place] [address]    (Thurs & Fri AM)      Dry skin and cracks (foot fissures):      Skin cracks can be cleaned, dried and then glued with New Skin  Monitor for increasing pain and redness which might be the beginning of an infection.  Moisturize with AmLactin cream once or twice daily -this was prescribed  Avoid excessive soaking of feet.  Feet should be moisturized immediately after water submersion  You can gently file the thick skin down with a pumice stone.    If the problem is not responding, please consider following up with dermatology.  There are other topical and even prescription medications.

## 2023-03-09 NOTE — PROGRESS NOTES
"ASSESSMENT:  Encounter Diagnoses   Name Primary?     Dry skin of heels Yes     Fissure in skin of both feet      Heel pain, bilateral      MEDICAL DECISION MAKING:  We discussed the cause and nature of dry skin issues around the heels.    Recommendations:  Dry skin and cracks (foot fissures):      Skin cracks can be cleaned, dried and then glued with New Skin  Monitor for increasing pain and redness which might be the beginning of an infection.  Moisturize with AmLactin cream once or twice daily -this was prescribed  Avoid excessive soaking of feet.  Feet should be moisturized immediately after water submersion  You can gently file the thick skin down with a pumice stone.    (L85.3) Dry skin of heels  (primary encounter diagnosis)  Plan: ammonium lactate (AMLACTIN) 12 % external cream    If the problem is not responding, please consider following up with dermatology.  There are other topical and even prescription medications.      Disclaimer: This note consists of symbols derived from keyboarding, dictation and/or voice recognition software. As a result, there may be errors in the script that have gone undetected. Please consider this when interpreting information found in this chart.    Ezio Leal DPM, FACFAS, MS    [facility] Department of Podiatry/Foot & Ankle Surgery      ____________________________________________________________________    HPI:       Virginia presents reporting \"cracked, dry heels and feet \"  She is dealt with this for 6 months  She feels discomfort in terms of some throbbing and stiffness on the bottom of the heels and feet  Discomfort daily  This is worse when not using moisturizer with weightbearing activities  She is established with a dermatologist    *  Past Medical History:   Diagnosis Date     Alcohol abuse      Alcoholic cirrhosis of liver with ascites      Anal fistula      Atypical ductal hyperplasia of breast 09/10/2010    ERT not recommended -left - and flat epithelial " atypia-scheduled for breast biopsy 9/17/2010      Bifid uvula      Cholelithiasis      Contact perianal dermatitis and other eczema     recurrent - clobetasol      Crohn disease      Fear of flying      - gets Ativan prn.      HCC (hepatocellular carcinoma) (H) 2/1/2023     Hypertension      IBS (irritable bowel syndrome)    *  *  Past Surgical History:   Procedure Laterality Date     BIOPSY ANAL N/A 3/28/2019    anal biopsy and culure placement of seton - Dr Fleming     BIOPSY BREAST Left 09/17/2010    - scheduled with Dr. Varma      BIOPSY LIVER  2019     COLONOSCOPY  2006     COLONOSCOPY N/A 12/23/2014    Procedure: COMBINED COLONOSCOPY, SINGLE OR MULTIPLE BIOPSY/POLYPECTOMY BY BIOPSY;  Surgeon: Diane Fleming MD;  Location:  GI     COLONOSCOPY N/A 12/5/2019    Procedure: COLONOSCOPY, WITH POLYPECTOMY AND BIOPSY;  Surgeon: Farhan Schilling MD;  Location: [facility] GI     ENDOSCOPIC RETROGRADE CHOLANGIOPANCREATOGRAM N/A 4/24/2020    Procedure: ENDOSCOPIC RETROGRADE CHOLANGIOPANCREATOGRAPHY WITH, sledge removal,sphincterotomy, stent in gallbladder and pancreatic duct stent, and balloon dilation;  Surgeon: Guru Isac Kraft MD;  Location: [facility] OR     ESOPHAGOSCOPY, GASTROSCOPY, DUODENOSCOPY (EGD), COMBINED N/A 12/5/2019    Procedure: ESOPHAGOGASTRODUODENOSCOPY (EGD);  Surgeon: Farhan Schilling MD;  Location: [facility] GI     EXAM UNDER ANESTHESIA ANUS N/A 3/28/2019    Procedure: EXAM UNDER ANESTHESIA ANUS;  Surgeon: Diane Fleming MD;  Location:  OR     EXAM UNDER ANESTHESIA ANUS N/A 2/26/2021    Procedure: EXAM UNDER ANESTHESIA OF ANUS, SETON PLACEMENT, EXCISION OF SKIN BRIDGE;  Surgeon: Avtar Nam MD;  Location: [facility] OR     EXAM UNDER ANESTHESIA ANUS N/A 1/6/2023    Procedure: EXAM UNDER ANESTHESIA, ANUS, SETON EXCHANGE, partial fistulotomy;  Surgeon: Mary Dugan MD;  Location: [facility] OR     HYSTERECTOMY, VAGINAL  2006    with Dr. Licha Zhou - with BSO for  fibroids      IR GALLBLADDER DRAIN PLACEMENT  4/22/2020     OPEN REDUCTION INTERNAL FIXATION ANKLE Left at age 27    plates and screws removed at age 36     Pelviscopy with removal of bilateral hydrosalpinges.  04/15/2010     ZZC APPENDECTOMY  at age 14   *  *  Current Outpatient Medications   Medication Sig Dispense Refill     ciprofloxacin (CIPRO) 500 MG tablet Take 1 tablet (500 mg) by mouth daily for 14 days Take at least 2 hrs before or 4 hrs after aluminum, calcium, iron, zinc or magnesium containing products. 14 tablet 0     clotrimazole (LOTRIMIN) 1 % vaginal cream Place 1 Applicatorful vaginally At Bedtime for 20 days 1 g 0     furosemide (LASIX) 40 MG tablet TAKE 1 TABLET BY MOUTH  DAILY (Patient taking differently: Take 40 mg by mouth every morning) 90 tablet 3     inFLIXimab (REMICADE) 100 MG injection Inject into the vein once Next dose 12/16/22       methylPREDNISolone (MEDROL DOSEPAK) 4 MG tablet therapy pack Take as directed on package. 21 tablet 0     metroNIDAZOLE (FLAGYL) 250 MG tablet Take 1 tablet (250 mg) by mouth 2 times daily for 14 days 28 tablet 0     Milk Thistle-Dand-Fennel-Licor (MILK THISTLE XTRA) CAPS capsule Take 1 capsule by mouth 2 times daily (Patient not taking: Reported on 2/17/2023)       Multiple Vitamins-Minerals (MULTIVITAMIN & MINERAL PO) Take by mouth every morning       ondansetron (ZOFRAN) 4 MG tablet Take 1-2 tablets (4-8 mg) by mouth every 6 hours as needed for nausea (vomiting) 40 tablet 0     oxyCODONE (ROXICODONE) 5 MG tablet Take 1-2 tablets (5-10 mg) by mouth every 6 hours as needed for moderate to severe pain 10 tablet 0     pantoprazole (PROTONIX) 40 MG EC tablet Take 1 tablet (40 mg) by mouth daily 30 tablet 0     spironolactone (ALDACTONE) 100 MG tablet TAKE 1 TABLET BY MOUTH  DAILY (Patient taking differently: Take 100 mg by mouth every morning) 90 tablet 3     triamcinolone (KENALOG) 0.1 % external cream Apply to AA BID x 1-2 week then PRN only 80 g 3          EXAM:    Vitals: LMP 05/31/2006   BMI: There is no height or weight on file to calculate BMI.    Constitutional:  Abiola Mtaute is in no apparent distress, appears well-nourished.  Cooperative with history and physical exam.    Vascular:  Pedal pulses are palpable for both the DP and PT arteries.  CFT < 3 sec.  No edema.      Neuro: Light touch sensation is intact to the L4, L5, S1 distributions  No evidence of weakness, spasticity, or contracture in the lower extremities.     Derm: No erythema, ecchymosis, or cyanosis.  No open lesions.   Generalized xerosis and hyperkeratosis involving the bilateral plantar heel.  There are some vertical superficial fissures on the posterior aspect of the heels.    Musculoskeletal:    Lower extremity muscle strength is normal. No gross deformities.

## 2023-03-10 NOTE — PROGRESS NOTES
Spoke with [facility] billing.  For ifx level it cost $698 vs predictr pK $798.    Will monitor if pt bill.

## 2023-03-13 ENCOUNTER — VIRTUAL VISIT (OUTPATIENT)
Dept: BEHAVIORAL HEALTH | Facility: CLINIC | Age: 58
End: 2023-03-13
Payer: COMMERCIAL

## 2023-03-13 DIAGNOSIS — F43.23 ADJUSTMENT DISORDER WITH MIXED ANXIETY AND DEPRESSED MOOD: Primary | ICD-10-CM

## 2023-03-13 DIAGNOSIS — F10.20 ALCOHOL USE DISORDER, MODERATE, DEPENDENCE (H): ICD-10-CM

## 2023-03-13 PROCEDURE — 90832 PSYTX W PT 30 MINUTES: CPT | Mod: VID | Performed by: PSYCHOLOGIST

## 2023-03-13 NOTE — LETTER
3/13/2023       RE: Abiola Matute  [address] [address]     Dear Colleague,    Thank you for referring your patient, Abiola Matute, to the [facility] MENTAL HEALTH & ADDICTION SERVICES at [facility]. Please see a copy of my visit note below.    [facility] and [facility]: Integrated Behavioral Health  March 13, 2023      Behavioral Health Clinician Progress Note    Patient Name: Abiola Matute           Service Type: Individual      Service Location:  Video Visit      Session Start Time: 1:00  Session End Time: 1:35      Session Length: 16 - 37      Attendees: Patient    Visit Activities (Refresh list every visit): [facility] Only    Service Modality:  Video Visit:      Provider verified identity through the following two step process.  Patient provided:  Patient photo and Patient is known previously to provider    Telemedicine Visit: The patient's condition can be safely assessed and treated via synchronous audio and visual telemedicine encounter.      Reason for Telemedicine Visit: Patient convenience (e.g. access to timely appointments / distance to available provider)    Originating Site (Patient Location): Patient's home    Distant Site (Provider Location): [facility]: [facility]    Consent:  The patient/guardian has verbally consented to: the potential risks and benefits of telemedicine (video visit) versus in person care; bill my insurance or make self-payment for services provided; and responsibility for payment of non-covered services.     Patient would like the video invitation sent by:  My Chart    Mode of Communication:  Video Conference via [employer]    Distant Location (Provider):  On-site    As the provider I attest to compliance with applicable laws and regulations related to telemedicine.    Diagnostic Assessment Date: 2/23/2023  Treatment Plan Review Date: IP; making  goals  See Flowsheets for today's PHQ-9 and TAINA-7 results  Previous PHQ-9:   PHQ-9 SCORE 9/27/2013 9/29/2014 2/23/2021   PHQ-9 Total Score 0 - -   PHQ-9 Total Score MyChart - 0 -   PHQ-9 Total Score - - 7     Previous TAINA-7:   TAINA-7 SCORE 9/27/2013 9/29/2014 2/23/2021   Total Score 0 - -   Total Score - 2 -   Total Score - - 6       ANIBAL LEVEL:  ANIBAL Score (Last Two) 8/17/2011 9/4/2020   ANIBAL Raw Score 38 30   Activation Score 52.9 56   ANIBAL Level 2 3       DATA  Extended Session (60+ minutes): No  Interactive Complexity: No  Crisis: No    Treatment Objective(s) Addressed in This Session:  Target Behavior(s): disease management/lifestyle changes anxiety and stress management    Anxiety: will experience a reduction in anxiety and will develop more effective coping skills to manage anxiety symptoms  Adjustment Difficulties: will develop coping/problem-solving skills to facilitate more adaptive adjustment    Current Stressors / Issues:  [facility] [place] met with [place] today to continue supporting her treatment of adjustment-related distress secondary to engagement in transplant/medical difficulties. [place] today states she is doing very well overall. She reports an improvement to her anxious distress since our last visit, citing confidence in her care team and having good social support at home. She has been doing fun social events lately and is managing not drinking well. She reports being open with others about her commitment to sobriety, which this writer encouraged. Provided affirmation about her openness with others about her needs and commitments to sobriety - discussed the benefits of not rationalizing her commitment to others. We explored a few skills she could utilize for adjustment-related anxiety as she proceeds with transplant, such as focusing on things she can control (versus matters outside her control) and explored a few examples of this in practice. Explored ways of her re-engaging with exercise and discussed a few ideas  about increasing motivation with this, such as trying the 2-minute rule and making her environment conducive to exercise. Agreed to follow-up again in about a month, around 3 weeks for ongoing supportive maintenance.       Progress on Treatment Objective(s) / Homework:  Stable - MAINTENANCE (Working to maintain change, with risk of relapse); Intervened by continuing to positively reinforce healthy behavior choice     Motivational Interviewing    MI Intervention: Expressed Empathy/Understanding, Supported Autonomy, Collaboration, Evocation, Permission to raise concern or advise, Open-ended questions and Reflections: simple and complex     Change Talk Expressed by the Patient: Committment to change Taking steps    Provider Response to Change Talk: E - Evoked more info from patient about behavior change, A - Affirmed patient's thoughts, decisions, or attempts at behavior change, R - Reflected patient's change talk and S - Summarized patient's change talk statements    Also provided psychoeducation about behavioral health condition, symptoms, and treatment options    Care Plan review completed: No    Medication Review:  No current psychiatric medications prescribed    Medication Compliance:  NA    Changes in Health Issues:   None reported    Chemical Use Review:   Substance Use: Chemical use reviewed, no active concerns identified - reports doing well in sobriety     Tobacco Use: No current tobacco use.      Assessment: Current Emotional / Mental Status (status of significant symptoms):  Risk status (Self / Other harm or suicidal ideation)  Patient denies a history of suicidal ideation, suicide attempts, self-injurious behavior, homicidal ideation, homicidal behavior and and other safety concerns  Patient denies current fears or concerns for personal safety.  Patient denies current or recent suicidal ideation or behaviors.  Patient denies current or recent homicidal ideation or behaviors.  Patient denies current or  recent self injurious behavior or ideation.  Patient denies other safety concerns.  A safety and risk management plan has not been developed at this time, however patient was encouraged to call [place] [phone] should there be a change in any of these risk factors.     [place] Suicide Severity Rating Scale (Lifetime/Recent)  [place] Suicide Severity Rating (Lifetime/Recent) 3/28/2019 4/3/2019 3/14/2023   Q1 Wished to be Dead (Past Month) no no -   Q2 Suicidal Thoughts (Past Month) no no -   Q6 Suicide Behavior (Lifetime) - no -   1. Wish to be Dead (Lifetime) - - 0   2. Non-Specific Active Suicidal Thoughts (Lifetime) - - 0   Actual Attempt (Lifetime) - - 0   Has subject engaged in non-suicidal self-injurious behavior? (Lifetime) - - 0   Interrupted Attempts (Lifetime) - - 0   Aborted or Self-Interrupted Attempt (Lifetime) - - 0   Preparatory Acts or Behavior (Lifetime) - - 0   Calculated C-SSRS Risk Score (Lifetime/Recent) - - No Risk Indicated   Some encounter information is confidential and restricted. Go to Review Flowsheets activity to see all data.       Appearance:   Appropriate   Eye Contact:   Good   Psychomotor Behavior: Normal   Attitude:   Cooperative   Orientation:   All  Speech   Rate / Production: Normal    Volume:  Normal   Mood:    Anxious   Affect:    Appropriate   Thought Content:  Clear   Thought Form:  Coherent  Logical   Insight:    Good     Diagnoses:  1. Adjustment disorder with mixed anxiety and depressed mood    2. Alcohol use disorder, moderate, dependence (H)        Collateral Reports Completed:  Routed note to Care Team Member(s)    Plan: (Homework, other):  Patient was given information about behavioral services and encouraged to schedule a follow up appointment with the clinic [facility] in 1 month.  She was also given information about mental health symptoms and treatment options .  CD Recommendations: CD Treatment at [facility] per CD evaluation, Participate in AA / NA , Maintain  Sobriety.     Patel Dumont Psy.D, LP   Behavioral Health Clinician   [facility]

## 2023-03-13 NOTE — PROGRESS NOTES
[facility]: Integrated Behavioral Health  March 13, 2023      Behavioral Health Clinician Progress Note    Patient Name: Abiola Matute           Service Type: Individual      Service Location:  Video Visit      Session Start Time: 1:00  Session End Time: 1:35      Session Length: 16 - 37      Attendees: Patient    Visit Activities (Refresh list every visit): [facility] Only    Service Modality:  Video Visit:      Provider verified identity through the following two step process.  Patient provided:  Patient photo and Patient is known previously to provider    Telemedicine Visit: The patient's condition can be safely assessed and treated via synchronous audio and visual telemedicine encounter.      Reason for Telemedicine Visit: Patient convenience (e.g. access to timely appointments / distance to available provider)    Originating Site (Patient Location): Patient's home    Distant Site (Provider Location): [facility]: [facility]    Consent:  The patient/guardian has verbally consented to: the potential risks and benefits of telemedicine (video visit) versus in person care; bill my insurance or make self-payment for services provided; and responsibility for payment of non-covered services.     Patient would like the video invitation sent by:  My Chart    Mode of Communication:  Video Conference via [facility]    Distant Location (Provider):  On-site    As the provider I attest to compliance with applicable laws and regulations related to telemedicine.    Diagnostic Assessment Date: 2/23/2023  Treatment Plan Review Date: IP; making goals  See Flowsheets for today's PHQ-9 and TAINA-7 results  Previous PHQ-9:   PHQ-9 SCORE 9/27/2013 9/29/2014 2/23/2021   PHQ-9 Total Score 0 - -   PHQ-9 Total Score MyChart - 0 -   PHQ-9 Total Score - - 7     Previous TAINA-7:   TAINA-7 SCORE 9/27/2013 9/29/2014 2/23/2021   Total Score 0 - -   Total Score - 2 -   Total Score - - 6       ANIBAL  LEVEL:  ANIBAL Score (Last Two) 8/17/2011 9/4/2020   ANIBAL Raw Score 38 30   Activation Score 52.9 56   ANIBAL Level 2 3       DATA  Extended Session (60+ minutes): No  Interactive Complexity: No  Crisis: No    Treatment Objective(s) Addressed in This Session:  Target Behavior(s): disease management/lifestyle changes anxiety and stress management    Anxiety: will experience a reduction in anxiety and will develop more effective coping skills to manage anxiety symptoms  Adjustment Difficulties: will develop coping/problem-solving skills to facilitate more adaptive adjustment    Current Stressors / Issues:  [facility] met with [place] today to continue supporting her treatment of adjustment-related distress secondary to engagement in transplant/medical difficulties. [place] today states she is doing very well overall. She reports an improvement to her anxious distress since our last visit, citing confidence in her care team and having good social support at home. She has been doing fun social events lately and is managing not drinking well. She reports being open with others about her commitment to sobriety, which this writer encouraged. Provided affirmation about her openness with others about her needs and commitments to sobriety - discussed the benefits of not rationalizing her commitment to others. We explored a few skills she could utilize for adjustment-related anxiety as she proceeds with transplant, such as focusing on things she can control (versus matters outside her control) and explored a few examples of this in practice. Explored ways of her re-engaging with exercise and discussed a few ideas about increasing motivation with this, such as trying the 2-minute rule and making her environment conducive to exercise. Agreed to follow-up again in about a month, around 3 weeks for ongoing supportive maintenance.       Progress on Treatment Objective(s) / Homework:  Stable - MAINTENANCE (Working to maintain change, with risk of  relapse); Intervened by continuing to positively reinforce healthy behavior choice     Motivational Interviewing    MI Intervention: Expressed Empathy/Understanding, Supported Autonomy, Collaboration, Evocation, Permission to raise concern or advise, Open-ended questions and Reflections: simple and complex     Change Talk Expressed by the Patient: Committment to change Taking steps    Provider Response to Change Talk: E - Evoked more info from patient about behavior change, A - Affirmed patient's thoughts, decisions, or attempts at behavior change, R - Reflected patient's change talk and S - Summarized patient's change talk statements    Also provided psychoeducation about behavioral health condition, symptoms, and treatment options    Care Plan review completed: No    Medication Review:  No current psychiatric medications prescribed    Medication Compliance:  NA    Changes in Health Issues:   None reported    Chemical Use Review:   Substance Use: Chemical use reviewed, no active concerns identified - reports doing well in sobriety     Tobacco Use: No current tobacco use.      Assessment: Current Emotional / Mental Status (status of significant symptoms):  Risk status (Self / Other harm or suicidal ideation)  Patient denies a history of suicidal ideation, suicide attempts, self-injurious behavior, homicidal ideation, homicidal behavior and and other safety concerns  Patient denies current fears or concerns for personal safety.  Patient denies current or recent suicidal ideation or behaviors.  Patient denies current or recent homicidal ideation or behaviors.  Patient denies current or recent self injurious behavior or ideation.  Patient denies other safety concerns.  A safety and risk management plan has not been developed at this time, however patient was encouraged to call [facility] should there be a change in any of these risk factors.     [place] Suicide Severity Rating Scale  (Lifetime/Recent)  [place] Suicide Severity Rating (Lifetime/Recent) 3/28/2019 4/3/2019 3/14/2023   Q1 Wished to be Dead (Past Month) no no -   Q2 Suicidal Thoughts (Past Month) no no -   Q6 Suicide Behavior (Lifetime) - no -   1. Wish to be Dead (Lifetime) - - 0   2. Non-Specific Active Suicidal Thoughts (Lifetime) - - 0   Actual Attempt (Lifetime) - - 0   Has subject engaged in non-suicidal self-injurious behavior? (Lifetime) - - 0   Interrupted Attempts (Lifetime) - - 0   Aborted or Self-Interrupted Attempt (Lifetime) - - 0   Preparatory Acts or Behavior (Lifetime) - - 0   Calculated C-SSRS Risk Score (Lifetime/Recent) - - No Risk Indicated   Some encounter information is confidential and restricted. Go to Review Flowsheets activity to see all data.       Appearance:   Appropriate   Eye Contact:   Good   Psychomotor Behavior: Normal   Attitude:   Cooperative   Orientation:   All  Speech   Rate / Production: Normal    Volume:  Normal   Mood:    Anxious   Affect:    Appropriate   Thought Content:  Clear   Thought Form:  Coherent  Logical   Insight:    Good     Diagnoses:  1. Adjustment disorder with mixed anxiety and depressed mood    2. Alcohol use disorder, moderate, dependence (H)        Collateral Reports Completed:  Routed note to Care Team Member(s)    Plan: (Homework, other):  Patient was given information about behavioral services and encouraged to schedule a follow up appointment with the clinic [facility] in 1 month.  She was also given information about mental health symptoms and treatment options .  CD Recommendations: CD Treatment at [facility] per CD evaluation, Participate in AA / NA , Maintain Sobriety.     Patel Dumont Psy.D, LP   Behavioral Health Clinician   [facility]

## 2023-03-14 ASSESSMENT — COLUMBIA-SUICIDE SEVERITY RATING SCALE - C-SSRS
6. HAVE YOU EVER DONE ANYTHING, STARTED TO DO ANYTHING, OR PREPARED TO DO ANYTHING TO END YOUR LIFE?: NO
2. HAVE YOU ACTUALLY HAD ANY THOUGHTS OF KILLING YOURSELF?: NO
1. HAVE YOU WISHED YOU WERE DEAD OR WISHED YOU COULD GO TO SLEEP AND NOT WAKE UP?: NO
ATTEMPT LIFETIME: NO
TOTAL  NUMBER OF INTERRUPTED ATTEMPTS LIFETIME: NO
TOTAL  NUMBER OF ABORTED OR SELF INTERRUPTED ATTEMPTS LIFETIME: NO

## 2023-03-15 ENCOUNTER — TEAM CONFERENCE (OUTPATIENT)
Dept: GASTROENTEROLOGY | Facility: CLINIC | Age: 58
End: 2023-03-15

## 2023-03-16 NOTE — TELEPHONE ENCOUNTER
Inflammatory Bowel Disease Conference    Requesting Provider: Dr. Farhan Schilling       Indication/Diagnosis for consultation: 57 year old female with cirrhosis and Crohn's    Short H&P: Crohn's disease remains in remission at this time.  She has predominately had perianal disease which is not active currently.  Her seton fell out but she is not having any abscess or new fistula development.  She is working on scheduling an exam under anesthesia for seton replacement.    GI History:   Age at diagnosis: 53 (4/2019)  Extent of disease: miguel angel-anal, anal  Disease phenotype: Miguel Angel-anal fistula  Miguel Angel-anal disease: Yes  Prior IBD surgeries: No. Seton placements  Prior IBD Medications: None    Treatment to Date: Immunotherapy  Current Medications: Infliximab 10 mg/kg every 4 weeks 11/2020.  Infliximab history- started May 2019. Dose increase to 10 mg/kg every 8 weeks but had continued active disease, dose increase to 10 mg/kg every 4 weeks 11/2020.    Colonoscopy Report: Yes    3/1/21: Normal flex sig.    Pathology Report: Yes 3/1/21    Imaging completed:      CT scan: No                         MRI: Yes  Normal MRE 6/16/22    PET scan: No                    Please see Telephone encounter 4/18/23 for Plan.

## 2023-03-16 NOTE — PROGRESS NOTES
Spoke with Arleth at [facility] Billing [phone], she states pt will have two bills for the ifx and the predict pK.    Spoke with Pamela at [facility] [employer] Send out [phone], explain the mix up with Pamela. Pt should not be billed for ifx level only the predict pK. Pamela cancelled the ifx level and credit the bill.

## 2023-03-21 NOTE — TELEPHONE ENCOUNTER
RECORDS RECEIVED FROM:   DATE RECEIVED:   NOTES STATUS DETAILS   OFFICE NOTE from referring provider    Internal SOT   OFFICE NOTE from other cardiologist    N/A    SUMMARY from hospital/ED   Internal 12-31-22   MEDICATION LIST   Internal    DIAGNOSTIC PROCEDURES     EKG   Internal 2-14-23   Monitor Reports   Internal 1-13-23   IMAGING (DISC & REPORT)      Echo   Internal 1-23-23   Stress Tests   Internal 1-23-23   Cath   N/A    MRI/MRA   N/A    CT/CTA   Internal 3-27-23 Coronary      show

## 2023-03-23 ENCOUNTER — TELEPHONE (OUTPATIENT)
Dept: PODIATRY | Facility: CLINIC | Age: 58
End: 2023-03-23

## 2023-03-23 NOTE — TELEPHONE ENCOUNTER
Patient was previously evaluated by provider on 3/9/23.   Plan from visit includes:     Skin cracks can be cleaned, dried and then glued with New Skin  Monitor for increasing pain and redness which might be the beginning of an infection.  Moisturize with AmLactin cream once or twice daily -this was prescribed  Avoid excessive soaking of feet.  Feet should be moisturized immediately after water submersion  You can gently file the thick skin down with a pumice stone.     If the problem is not responding, please consider following up with dermatology.  There are other topical and even prescription medications.      No antibiotic was previously prescribed by provider.   Left voicemail for patient request return call to discuss further.     Brenda Gonzalez, ATC

## 2023-03-23 NOTE — TELEPHONE ENCOUNTER
[facility] Call Center    Phone Message    May a detailed message be left on voicemail: yes     Reason for Call: Medication Refill Request    Has the patient contacted the pharmacy for the refill? Yes   Name of medication being requested: Antibiotic for infection  Provider who prescribed the medication: Ezio Leal  Pharmacy: [facility]  Date medication is needed: asap   OK to leave detailed message.      Action Taken: Other: [facility]    Travel Screening: Not Applicable

## 2023-03-27 ENCOUNTER — HOSPITAL ENCOUNTER (OUTPATIENT)
Dept: NUCLEAR MEDICINE | Facility: CLINIC | Age: 58
Setting detail: NUCLEAR MEDICINE
Discharge: HOME | End: 2023-03-27
Attending: INTERNAL MEDICINE
Payer: COMMERCIAL

## 2023-03-27 ENCOUNTER — HOSPITAL ENCOUNTER (OUTPATIENT)
Dept: CT IMAGING | Facility: CLINIC | Age: 58
Discharge: HOME | End: 2023-03-27
Attending: INTERNAL MEDICINE
Payer: COMMERCIAL

## 2023-03-27 ENCOUNTER — ANCILLARY PROCEDURE (OUTPATIENT)
Dept: BONE DENSITY | Facility: CLINIC | Age: 58
End: 2023-03-27
Attending: INTERNAL MEDICINE
Payer: COMMERCIAL

## 2023-03-27 VITALS — DIASTOLIC BLOOD PRESSURE: 62 MMHG | HEART RATE: 75 BPM | SYSTOLIC BLOOD PRESSURE: 97 MMHG | RESPIRATION RATE: 20 BRPM

## 2023-03-27 DIAGNOSIS — K76.6 PORTAL HYPERTENSION (H): ICD-10-CM

## 2023-03-27 DIAGNOSIS — K70.31 ALCOHOLIC CIRRHOSIS OF LIVER WITH ASCITES (H): ICD-10-CM

## 2023-03-27 DIAGNOSIS — C22.0 HEPATOCELLULAR CARCINOMA (H): ICD-10-CM

## 2023-03-27 PROCEDURE — A9503 TC99M MEDRONATE: HCPCS | Performed by: INTERNAL MEDICINE

## 2023-03-27 PROCEDURE — 343N000001 HC RX 343: Performed by: INTERNAL MEDICINE

## 2023-03-27 PROCEDURE — 250N000013 HC RX MED GY IP 250 OP 250 PS 637: Performed by: INTERNAL MEDICINE

## 2023-03-27 PROCEDURE — 78306 BONE IMAGING WHOLE BODY: CPT | Mod: 26 | Performed by: RADIOLOGY

## 2023-03-27 PROCEDURE — 78306 BONE IMAGING WHOLE BODY: CPT

## 2023-03-27 PROCEDURE — 77080 DXA BONE DENSITY AXIAL: CPT | Performed by: INTERNAL MEDICINE

## 2023-03-27 PROCEDURE — 250N000011 HC RX IP 250 OP 636: Performed by: INTERNAL MEDICINE

## 2023-03-27 PROCEDURE — 75574 CT ANGIO HRT W/3D IMAGE: CPT

## 2023-03-27 PROCEDURE — 75574 CT ANGIO HRT W/3D IMAGE: CPT | Mod: 26 | Performed by: INTERNAL MEDICINE

## 2023-03-27 RX ORDER — DIPHENHYDRAMINE HCL 25 MG
25 CAPSULE ORAL
Status: DISCONTINUED | OUTPATIENT
Start: 2023-03-27 | End: 2023-03-28 | Stop reason: HOSPADM

## 2023-03-27 RX ORDER — DIPHENHYDRAMINE HYDROCHLORIDE 50 MG/ML
25-50 INJECTION INTRAMUSCULAR; INTRAVENOUS
Status: DISCONTINUED | OUTPATIENT
Start: 2023-03-27 | End: 2023-03-28 | Stop reason: HOSPADM

## 2023-03-27 RX ORDER — METHYLPREDNISOLONE SODIUM SUCCINATE 125 MG/2ML
125 INJECTION, POWDER, LYOPHILIZED, FOR SOLUTION INTRAMUSCULAR; INTRAVENOUS
Status: DISCONTINUED | OUTPATIENT
Start: 2023-03-27 | End: 2023-03-28 | Stop reason: HOSPADM

## 2023-03-27 RX ORDER — NITROGLYCERIN 0.4 MG/1
.4-.8 TABLET SUBLINGUAL
Status: DISCONTINUED | OUTPATIENT
Start: 2023-03-27 | End: 2023-03-28 | Stop reason: HOSPADM

## 2023-03-27 RX ORDER — IVABRADINE 5 MG/1
5-15 TABLET, FILM COATED ORAL
Status: COMPLETED | OUTPATIENT
Start: 2023-03-27 | End: 2023-03-27

## 2023-03-27 RX ORDER — TC 99M MEDRONATE 20 MG/10ML
20-30 INJECTION, POWDER, LYOPHILIZED, FOR SOLUTION INTRAVENOUS ONCE
Status: COMPLETED | OUTPATIENT
Start: 2023-03-27 | End: 2023-03-27

## 2023-03-27 RX ORDER — ONDANSETRON 2 MG/ML
4 INJECTION INTRAMUSCULAR; INTRAVENOUS
Status: DISCONTINUED | OUTPATIENT
Start: 2023-03-27 | End: 2023-03-28 | Stop reason: HOSPADM

## 2023-03-27 RX ORDER — METOPROLOL TARTRATE 25 MG/1
25-100 TABLET, FILM COATED ORAL
Status: COMPLETED | OUTPATIENT
Start: 2023-03-27 | End: 2023-03-27

## 2023-03-27 RX ORDER — IOPAMIDOL 755 MG/ML
110 INJECTION, SOLUTION INTRAVASCULAR ONCE
Status: COMPLETED | OUTPATIENT
Start: 2023-03-27 | End: 2023-03-27

## 2023-03-27 RX ORDER — ACYCLOVIR 200 MG/1
0-1 CAPSULE ORAL
Status: DISCONTINUED | OUTPATIENT
Start: 2023-03-27 | End: 2023-03-28 | Stop reason: HOSPADM

## 2023-03-27 RX ORDER — METOPROLOL TARTRATE 1 MG/ML
5-15 INJECTION, SOLUTION INTRAVENOUS
Status: DISCONTINUED | OUTPATIENT
Start: 2023-03-27 | End: 2023-03-28 | Stop reason: HOSPADM

## 2023-03-27 RX ADMIN — NITROGLYCERIN 0.8 MG: 0.4 TABLET SUBLINGUAL at 10:44

## 2023-03-27 RX ADMIN — TC 99M MEDRONATE 28 MILLICURIE: 20 INJECTION, POWDER, LYOPHILIZED, FOR SOLUTION INTRAVENOUS at 08:54

## 2023-03-27 RX ADMIN — IVABRADINE 10 MG: 5 TABLET, FILM COATED ORAL at 09:17

## 2023-03-27 RX ADMIN — METOPROLOL TARTRATE 50 MG: 50 TABLET, FILM COATED ORAL at 09:18

## 2023-03-27 RX ADMIN — IOPAMIDOL 110 ML: 755 INJECTION, SOLUTION INTRAVENOUS at 10:29

## 2023-03-27 NOTE — PROGRESS NOTES
Pt arrived for Coronary CT angiogram. Test, meds and side effects reviewed with pt.  Resting HR  75 bpm. Given 50 mg PO Metoprolol + 10 mg PO Ivabradine per verbal order. Administered 0.8 mg SL nitro on CTA table per order. CTA completed.  Patient tolerated procedure well and denies symptoms of allergic reaction.  Post monitoring completed and VSS.  D/C instructions reviewed with pt whom verbalized understanding of need to increase PO fluids today. D/C to gold waiting room accompanied by staff.

## 2023-03-28 ENCOUNTER — PRE VISIT (OUTPATIENT)
Dept: CARDIOLOGY | Facility: CLINIC | Age: 58
End: 2023-03-28
Payer: MEDICARE

## 2023-03-28 ENCOUNTER — VIRTUAL VISIT (OUTPATIENT)
Dept: CARDIOLOGY | Facility: CLINIC | Age: 58
End: 2023-03-28
Attending: INTERNAL MEDICINE
Payer: COMMERCIAL

## 2023-03-28 DIAGNOSIS — C22.0 HEPATOCELLULAR CARCINOMA (H): ICD-10-CM

## 2023-03-28 DIAGNOSIS — K76.6 PORTAL HYPERTENSION (H): ICD-10-CM

## 2023-03-28 DIAGNOSIS — K70.31 ALCOHOLIC CIRRHOSIS OF LIVER WITH ASCITES (H): ICD-10-CM

## 2023-03-28 PROCEDURE — G0463 HOSPITAL OUTPT CLINIC VISIT: HCPCS | Mod: PN,GT | Performed by: INTERNAL MEDICINE

## 2023-03-28 PROCEDURE — 99204 OFFICE O/P NEW MOD 45 MIN: CPT | Mod: VID | Performed by: INTERNAL MEDICINE

## 2023-03-28 NOTE — LETTER
3/28/2023    RE: Abiola Riveramacarena  [address] [address]     Dear Colleague,    Thank you for the opportunity to participate in the care of your patient, Abiola Matute, at the [facility]. Please see a copy of my visit note below.    No notes on file    Please do not hesitate to contact me if you have any questions/concerns.     Sincerely,     MAXIM Gilbert MD

## 2023-03-28 NOTE — PROGRESS NOTES
"[place]ia is a 57 year old who is being evaluated via a billable video visit.      How would you like to obtain your AVS? MyChart  If the video visit is dropped, the invitation should be resent by:   Will anyone else be joining your video visit? No      HPI : Patient here for liver transplant evaluation.  Laënnec cirrhosis and hepatocellular carcinoma.  Currently patient is not very active but denied cardiac symptoms.  She has no prior cardiac history.  She has no significant cardiac risk factors.  Non-smoker.  No diabetes or hypertension.  No premature coronary artery disease in family.       Review of Systems   Constitutional, HEENT, cardiovascular, pulmonary, gi and gu systems are negative, except as otherwise noted.      Objective    Vitals - Patient Reported  Weight (Patient Reported): 81.6 kg (180 lb)  Height (Patient Reported): 165.1 cm (5' 5\")  BMI (Based on Pt Reported Ht/Wt): 29.95  Pain Score: No Pain (0)        Physical Exam   GENERAL: Healthy, alert and no distress  EYES: Eyes grossly normal to inspection.  No discharge or erythema, or obvious scleral/conjunctival abnormalities.  RESP: No audible wheeze, cough, or visible cyanosis.  No visible retractions or increased work of breathing.    SKIN: Visible skin clear. No significant rash, abnormal pigmentation or lesions.  NEURO: Cranial nerves grossly intact.  Mentation and speech appropriate for age.  PSYCH: Mentation appears normal, affect normal/bright, judgement and insight intact, normal speech and appearance well-groomed.    ASSESSMENT/PLAN:  Patient here for liver transplant evaluation.  Laënnec cirrhosis with hepatocellular carcinoma.  Currently patient is not very active but denied cardiac symptoms.  No prior cardiac history.  She has no significant cardiac risk factors.  Reviewed EKG.  Normal sinus rhythm normal EKG.  In January of this year patient had an exercise stress echo.  Average functional capacity.  Target heart rate was achieved.  Normal " stress echo.  Estimated PA pressure was normal.  Patient had a CT coronary angiogram yesterday.  This showed widely patent arteries with no significant coronary artery disease.  Her total calcium score was 0.  Overall low risk profile.  Patient should proceed with the transplant.  No additional cardiac evaluation is needed.        Video-Visit Details    Type of service:  Video Visit video visit start time 11:23 AM.  Video visit end time    Originating Location (pt. Location): Home    Distant Location (provider location):  On-site  Platform used for Video Visit: Bioptigen    Total visit duration 45 minutes.  This include video interview, extensive chart review, review of EKGs, stress echo, CT coronary angiogram and documentation.

## 2023-03-28 NOTE — NURSING NOTE
Chief Complaint   Patient presents with     Consult     Consult for pre-liver clearance, no initial questions/concerns per pt. Medication/allergies reviewed with pt, no changes per pt.        Is the patient currently in the state of [place]? YES    Visit mode:VIDEO    If the visit is dropped, the patient can be reconnected by: VIDEO VISIT: Send to e-mail at: [email or web address]    Will anyone else be joining the visit? NO      How would you like to obtain your AVS? MyChart    Are changes needed to the allergy or medication list? NO    Patient denies any changes since echeck-in regarding medication and allergies and states all information entered during echeck-in remains accurate.    Joni Boothe, Visit Facilitator/MA.

## 2023-03-29 ENCOUNTER — HOSPITAL ENCOUNTER (OUTPATIENT)
Dept: MRI IMAGING | Facility: CLINIC | Age: 58
Discharge: HOME | End: 2023-03-29
Attending: RADIOLOGY
Payer: COMMERCIAL

## 2023-03-29 ENCOUNTER — LAB (OUTPATIENT)
Dept: LAB | Facility: CLINIC | Age: 58
End: 2023-03-29
Attending: RADIOLOGY
Payer: COMMERCIAL

## 2023-03-29 DIAGNOSIS — C22.0 HCC (HEPATOCELLULAR CARCINOMA) (H): ICD-10-CM

## 2023-03-29 LAB
AFP SERPL-MCNC: 149 NG/ML
ALBUMIN SERPL BCG-MCNC: 3.5 G/DL (ref 3.5–5.2)
ALP SERPL-CCNC: 112 U/L (ref 35–104)
ALT SERPL W P-5'-P-CCNC: 40 U/L (ref 10–35)
ANION GAP SERPL CALCULATED.3IONS-SCNC: 10 MMOL/L (ref 7–15)
AST SERPL W P-5'-P-CCNC: 68 U/L (ref 10–35)
BILIRUB DIRECT SERPL-MCNC: 0.49 MG/DL (ref 0–0.3)
BILIRUB SERPL-MCNC: 1.5 MG/DL
BUN SERPL-MCNC: 9.5 MG/DL (ref 6–20)
CALCIUM SERPL-MCNC: 9.1 MG/DL (ref 8.6–10)
CHLORIDE SERPL-SCNC: 104 MMOL/L (ref 98–107)
CREAT SERPL-MCNC: 0.7 MG/DL (ref 0.51–0.95)
DEPRECATED HCO3 PLAS-SCNC: 25 MMOL/L (ref 22–29)
ERYTHROCYTE [DISTWIDTH] IN BLOOD BY AUTOMATED COUNT: 14.3 % (ref 10–15)
GFR SERPL CREATININE-BSD FRML MDRD: >90 ML/MIN/1.73M2
GLUCOSE SERPL-MCNC: 100 MG/DL (ref 70–99)
HCT VFR BLD AUTO: 43 % (ref 35–47)
HGB BLD-MCNC: 14.3 G/DL (ref 11.7–15.7)
INR PPP: 1.14 (ref 0.85–1.15)
MCH RBC QN AUTO: 34.7 PG (ref 26.5–33)
MCHC RBC AUTO-ENTMCNC: 33.3 G/DL (ref 31.5–36.5)
MCV RBC AUTO: 104 FL (ref 78–100)
PLATELET # BLD AUTO: 100 10E3/UL (ref 150–450)
POTASSIUM SERPL-SCNC: 4 MMOL/L (ref 3.4–5.3)
PROT SERPL-MCNC: 7.7 G/DL (ref 6.4–8.3)
RBC # BLD AUTO: 4.12 10E6/UL (ref 3.8–5.2)
SODIUM SERPL-SCNC: 139 MMOL/L (ref 136–145)
WBC # BLD AUTO: 2.4 10E3/UL (ref 4–11)

## 2023-03-29 PROCEDURE — 74183 MRI ABD W/O CNTR FLWD CNTR: CPT

## 2023-03-29 PROCEDURE — 85027 COMPLETE CBC AUTOMATED: CPT

## 2023-03-29 PROCEDURE — 255N000002 HC RX 255 OP 636: Performed by: RADIOLOGY

## 2023-03-29 PROCEDURE — A9585 GADOBUTROL INJECTION: HCPCS | Performed by: RADIOLOGY

## 2023-03-29 PROCEDURE — 82248 BILIRUBIN DIRECT: CPT

## 2023-03-29 PROCEDURE — 36415 COLL VENOUS BLD VENIPUNCTURE: CPT

## 2023-03-29 PROCEDURE — 82105 ALPHA-FETOPROTEIN SERUM: CPT

## 2023-03-29 PROCEDURE — 85610 PROTHROMBIN TIME: CPT

## 2023-03-29 RX ORDER — GADOBUTROL 604.72 MG/ML
8 INJECTION INTRAVENOUS ONCE
Status: COMPLETED | OUTPATIENT
Start: 2023-03-29 | End: 2023-03-29

## 2023-03-29 RX ADMIN — GADOBUTROL 8 ML: 604.72 INJECTION INTRAVENOUS at 11:32

## 2023-03-31 ENCOUNTER — TELEPHONE (OUTPATIENT)
Dept: GASTROENTEROLOGY | Facility: CLINIC | Age: 58
End: 2023-03-31
Payer: MEDICARE

## 2023-03-31 NOTE — TELEPHONE ENCOUNTER
[facility]    Phone Message    May a detailed message be left on voicemail: yes     Reason for Call: Other: Patient called to reschedule the 4/24/23 appt with Dr. Schilling, as she will be out of the country 4[place]14/23 - 4/28/23.  However, she wanted to speak to [place] about it. Please have [place] follow up with patient.     Action Taken: Message routed to:  [facility]    Travel Screening: Not Applicable

## 2023-03-31 NOTE — TELEPHONE ENCOUNTER
Spoke with [place] to offer a follow up with Dr. Fuller on 6/13/23 at 1:40pm    [facility],  Please call pt to schedule Return/follow up visit on 6/13/23 at 1:40pm. I have the slot on hold.    Please let RN know when scheduled.    Thank you.  Remi

## 2023-03-31 NOTE — TELEPHONE ENCOUNTER
March 31, 2023    Called [place] Left message and contact info to return call regarding: reschedule appt

## 2023-04-03 ENCOUNTER — VIRTUAL VISIT (OUTPATIENT)
Dept: BEHAVIORAL HEALTH | Facility: CLINIC | Age: 58
End: 2023-04-03
Payer: COMMERCIAL

## 2023-04-03 DIAGNOSIS — F43.23 ADJUSTMENT DISORDER WITH MIXED ANXIETY AND DEPRESSED MOOD: Primary | ICD-10-CM

## 2023-04-03 DIAGNOSIS — F10.20 ALCOHOL USE DISORDER, MODERATE, DEPENDENCE (H): ICD-10-CM

## 2023-04-03 DIAGNOSIS — K70.31 ALCOHOLIC CIRRHOSIS OF LIVER WITH ASCITES (H): Primary | ICD-10-CM

## 2023-04-03 PROCEDURE — 90834 PSYTX W PT 45 MINUTES: CPT | Mod: VID | Performed by: PSYCHOLOGIST

## 2023-04-03 NOTE — PROGRESS NOTES
[facility]: Integrated Behavioral Health  April 3, 2023        Behavioral Health Clinician Progress Note    Patient Name: Abiola Matute           Service Type: Individual      Service Location:  Video Visit      Session Start Time: 1:00  Session End Time: 1:40      Session Length: 38 - 52      Attendees: Patient    Visit Activities (Refresh list every visit): [facility] Only    Service Modality:  Video Visit:      Provider verified identity through the following two step process.  Patient provided:  Patient photo and Patient is known previously to provider    Telemedicine Visit: The patient's condition can be safely assessed and treated via synchronous audio and visual telemedicine encounter.      Reason for Telemedicine Visit: Patient convenience (e.g. access to timely appointments / distance to available provider)    Originating Site (Patient Location): Patient's home    Distant Site (Provider Location): [facility]: [facility]    Consent:  The patient/guardian has verbally consented to: the potential risks and benefits of telemedicine (video visit) versus in person care; bill my insurance or make self-payment for services provided; and responsibility for payment of non-covered services.     Patient would like the video invitation sent by:  My Chart    Mode of Communication:  Video Conference via [facility]    Distant Location (Provider):  On-site    As the provider I attest to compliance with applicable laws and regulations related to telemedicine.    Diagnostic Assessment Date: 2/23/2023  Treatment Plan Review Date: IP; making goals  See Flowsheets for today's PHQ-9 and TAINA-7 results  Previous PHQ-9:       9/27/2013     9:00 AM 9/29/2014    10:26 AM 2/23/2021    11:18 AM   PHQ-9 SCORE   PHQ-9 Total Score 0     PHQ-9 Total Score MyChart  0    PHQ-9 Total Score   7     Previous TAINA-7:       9/29/2014    10:27 AM 2/23/2021    11:18 AM   TAINA-7 SCORE   Total Score 2  (minimal anxiety)    Total Score  6       ANIBAL LEVEL:      8/17/2011    12:00 PM 9/4/2020     1:29 PM   ANIBAL Score (Last Two)   ANIBAL Raw Score 38 30   Activation Score 52.9 56   ANIBAL Level 2 3       DATA  Extended Session (60+ minutes): No  Interactive Complexity: No  Crisis: No    Treatment Objective(s) Addressed in This Session:  Target Behavior(s): disease management/lifestyle changes anxiety and stress management    Anxiety: will experience a reduction in anxiety and will develop more effective coping skills to manage anxiety symptoms  Adjustment Difficulties: will develop coping/problem-solving skills to facilitate more adaptive adjustment  Alcohol / Substance Use: continue to make healthy choices regarding substance use and engage in activities / supportive services that promote sobriety    Current Stressors / Issues:  [facility] [place] met with [place] today to continue supporting her treatment of adjustment-related distress secondary to engagement in transplant/medical difficulties. [place] today states she is doing very well overall. We continued our discussion today about adaptive steps to take to manage triggers/cravings for alcohol use and to manage stress. Reviewed what successful management of triggers might look like for her; helped [place] identify how exercise, using her journal, mindfulness ideas, and cognitive exercises could help her manage stress/triggers if they were to occur. Discussed finding a response set that will work well for her moving forward to this end, though she is interested in learning additional skills from others in CD treatment that she plans to begin within the next month. We talked about her readiness for her vacation to [place] and her confidence in managing sobriety while there. Continued to utilize MI and solution-focused ideas to help [place] with her agenda items today.       Progress on Treatment Objective(s) / Homework:  Stable - MAINTENANCE (Working to maintain change, with risk of relapse);  Intervened by continuing to positively reinforce healthy behavior choice     Motivational Interviewing    MI Intervention: Expressed Empathy/Understanding, Supported Autonomy, Collaboration, Evocation, Permission to raise concern or advise, Open-ended questions and Reflections: simple and complex     Change Talk Expressed by the Patient: Committment to change Taking steps    Provider Response to Change Talk: E - Evoked more info from patient about behavior change, A - Affirmed patient's thoughts, decisions, or attempts at behavior change, R - Reflected patient's change talk and S - Summarized patient's change talk statements    Also provided psychoeducation about behavioral health condition, symptoms, and treatment options    Care Plan review completed: Yes    Medication Review:  No current psychiatric medications prescribed    Medication Compliance:  NA    Changes in Health Issues:   None reported    Chemical Use Review:   Substance Use: Chemical use reviewed, no active concerns identified - reports doing well in sobriety     Tobacco Use: No current tobacco use.      Assessment: Current Emotional / Mental Status (status of significant symptoms):  Risk status (Self / Other harm or suicidal ideation)  Patient denies a history of suicidal ideation, suicide attempts, self-injurious behavior, homicidal ideation, homicidal behavior and and other safety concerns  Patient denies current fears or concerns for personal safety.  Patient denies current or recent suicidal ideation or behaviors.  Patient denies current or recent homicidal ideation or behaviors.  Patient denies current or recent self injurious behavior or ideation.  Patient denies other safety concerns.  A safety and risk management plan has not been developed at this time, however patient was encouraged to call [place] [phone] should there be a change in any of these risk factors.     [place] Suicide Severity Rating Scale (Lifetime/Recent)      3/28/2019      9:25 AM 4/3/2019    12:53 PM 2/17/2023    10:00 AM 3/14/2023     8:23 AM   [place] Suicide Severity Rating (Lifetime/Recent)   Q1 Wished to be Dead (Past Month) no no     Q2 Suicidal Thoughts (Past Month) no no     Q3 Suicidal Thought Method       Q4 Suicidal Intent without Specific Plan       Q5 Suicide Intent with Specific Plan       Q6 Suicide Behavior (Lifetime)  no     Level of Risk per Screen       1. Wish to be Dead (Lifetime)    N   2. Non-Specific Active Suicidal Thoughts (Lifetime)    N   Actual Attempt (Lifetime)    N   Has subject engaged in non-suicidal self-injurious behavior? (Lifetime)    N   Interrupted Attempts (Lifetime)    N   Aborted or Self-Interrupted Attempt (Lifetime)    N   Preparatory Acts or Behavior (Lifetime)    N   Calculated C-SSRS Risk Score (Lifetime/Recent)    No Risk Indicated       Information is confidential and restricted. Go to Review Flowsheets to unlock data.       Appearance:   Appropriate   Eye Contact:   Good   Psychomotor Behavior: Normal   Attitude:   Cooperative   Orientation:   All  Speech   Rate / Production: Normal    Volume:  Normal   Mood:    Anxious   Affect:    Appropriate   Thought Content:  Clear   Thought Form:  Coherent  Logical   Insight:    Good     Diagnoses:  1. Adjustment disorder with mixed anxiety and depressed mood    2. Alcohol use disorder, moderate, dependence (H)        Collateral Reports Completed:  Routed note to Care Team Member(s)    Plan: (Homework, other):  Patient was given information about behavioral services and encouraged to schedule a follow up appointment with the clinic [facility] [place] in 1 month.  She was also given information about mental health symptoms and treatment options  and strategies to maintain sobriety.  CD Recommendations: CD Treatment at [facility] per CD evaluation, Participate in AA / NA , Maintain Sobriety.     Patel Dumont Psy.D,    Behavioral Health Clinician   [facility]    _____________________________________________________________________________________________                                              Individual Treatment Plan    Patient's Name: Abiola Matute  YOB: 1965    Date of Creation: 4/3/2023  Date Treatment Plan Last Reviewed/Revised: 4/3/2023    DSM5 Diagnoses: Adjustment Disorders  309.28 (F43.23) With mixed anxiety and depressed mood or Substance-Related & Addictive Disorders Alcohol Use Disorder   303.90 (F10.20) Moderate    Psychosocial / Contextual Factors: SOT  PROMIS (reviewed every 90 days): IP    Referral / Collaboration:  Referral to another professional/service is not indicated at this time..    Anticipated number of session for this episode of care: 3  Anticipation frequency of session: once per month  Anticipated Duration of each session: 38-52 minutes  Treatment plan will be reviewed in 90 days or when goals have been changed.       MeasurableTreatment Goal(s) related to diagnosis / functional impairment(s)  Goal 1: Patient will experience a reduction in depressive symptoms along with a corresponding increase in positive emotion and life satisfaction.       Objective #A (Patient Action)    Patient will Increase interest, engagement, and pleasure in doing things.  Status: Continued - Date(s): 4/3/2023    Intervention(s)  Therapist will help patient identify pleasant and mastery oriented events that elicit positive, relaxed mood.    Objective #B  Patient will Decrease frequency and intensity of feeling down, depressed, hopeless.  Status: Continued - Date(s): 4/3/2023    Intervention(s)  Therapist will introduce patient to cognitive-behavioral and acceptance and commitment therapy topics aimed to help reduce depression and anxiety    Objective #C  Patient will Identify negative self-talk and behaviors: challenge core beliefs, myths, and actions  Improve concentration, focus, and mindfulness in daily activities .  Status: Continued -  "Date(s): 4/3/2023    Intervention(s)  Therapist will help patient identify and manage negative self-talk and automatic thoughts; introduce patient to cognitive distortions; help patient develop cognitive diffusion techniques      Goal 2: Patient will experience a reduction in anxious symptoms, along with a corresponding increase in relaxed emotional states and life satisfaction.    Objective #A (Patient Action)  Patient will use cognitive-behavioral and thought diffusion strategies identified in therapy to challenge anxious thoughts.    Status: Continued - Date(s): 4/3/2023    Intervention(s)  Therapist will utilize CBT and ACT ideas to help patient challenge anxious thoughts and reduce intensity/duration of anxious distress    Objective #B  Patient will use \"worry time\" each day for 15 minutes of scheduled worry and then defer obsessive or anxious thinking until the next structured worry time.    Status: Continued - Date(s): 4/3/2023    Intervention(s)  Therapist will teach patient how to effectively utilize worry time and/or thought logs/journals each day and incorporate more relaxation behaviors into their routine.    Objective #C  Patient will identify the stressors which contribute to feelings of anxiety  Patient will increase engagement in adaptive coping skills and recreational activities, such as exercise and healthy socialization, to manage distress.  Status: Continued - Date(s): 4/3/2023    Intervention(s)  Therapist will help patient identify triggers/situational factors that contribute to anxiety and behavioral skills aimed to manage anxious distress.      Goal 3: Patiient will work toward maintaining sobriety from alcohol    I will know I've met my goal when I feel confident in my sobriety.      Objective #A (Patient Action)    Status: Continued - Date(s): 4/3/2023    Patient will maintain sobriety by learning adaptive responses to triggers.    Intervention(s)  Therapist will help patient identify " coping plan.    Objective #B  Patient will identify at least 2-3 example(s) of how drinking has resulted in an experience that interferes with person values or goals.    Status: Continued - Date(s): 4/3/2023    Intervention(s)  Therapist will facilitate discussion on above.    Objective #C  Patient will increase understanding of SUDs.  Status: Continued - Date(s): 4/3/2023    Intervention(s)  Therapist will teach information about RENEE and how they are treated.    Other Possible Therapeutic Intervention(s):    Psycho-education regarding mental health diagnoses and treatment options    Supportive Therapy    Provide affirmations, reflections, and establish working rapport    Emphasize and reflect on strength of therapeutic relationship    Skills training    Explore skills useful to client in current situation.    Skills include assertiveness, communication, conflict management, problem-solving, relaxation, etc.    Solution-Focused Therapy    Explore patterns in patient's relationships and discuss options for new behaviors.    Explore patterns in patient's actions and choices and discuss options for new behaviors.    Cognitive-behavioral Therapy    Discuss common cognitive distortions, identify them in patient's life.    Explore ways to challenge, replace, and act against these cognitions.    Acceptance and Commitment Therapy    Explore and identify important values in patient's life.    Discuss ways to commit to behavioral activation around these values.    Psychodynamic psychotherapy    Discuss patient's emotional dynamics and issues and how they impact behaviors.    Explore patient's history of relationships and how they impact present behaviors.    Explore how to work with and make changes in these schemas and patterns.    Narrative Therapy    Explore the patient's story of his/her life from his/her perspective.    Explore alternate ways of understanding their experience, identifying exceptions, developing new  themes.    Interpersonal Psychotherapy    Explore patterns in relationships that are effective or ineffective at helping patient reach their goals, find satisfying experience.    Discuss new patterns or behaviors to engage in for improved social functioning.    Behavioral Activation    Discuss steps patient can take to become more involved in meaningful activity.    Identify barriers to these activities and explore possible solutions.    Mindfulness-Based Strategies    Discuss skills based on development and application of mindfulness.    Skills drawn from compassion-focused therapy, dialectical behavior therapy, mindfulness-based stress reduction, mindfulness-based cognitive therapy, etc.      Patient has reviewed and agreed to the above plan.      Patel Dumont Psy.D, LP   Behavioral Health Clinician   -[facility]     April 3, 2023

## 2023-04-04 ENCOUNTER — TELEPHONE (OUTPATIENT)
Dept: GASTROENTEROLOGY | Facility: CLINIC | Age: 58
End: 2023-04-04
Payer: MEDICARE

## 2023-04-04 NOTE — TELEPHONE ENCOUNTER
LVM, sent mychart    Pt requested to reschedule her 4/24 appt with Dr. Schilling as [place]. Ok per Remi GOODRICH to schedule pt with Dr. Fuller on 6/13/23 at 1:40pm. Slot is held. Left L pod #     Please let Remi GOODRICH know when scheduled.

## 2023-04-05 ENCOUNTER — TELEPHONE (OUTPATIENT)
Dept: GASTROENTEROLOGY | Facility: CLINIC | Age: 58
End: 2023-04-05
Payer: MEDICARE

## 2023-04-05 NOTE — TELEPHONE ENCOUNTER
----- Message from Jazzy Haywood RN sent at 4/5/2023 11:13 AM CDT -----  Regarding: FW: remicade  Hey all,    I am wondering where this pt gets their remicade?    I am hearing that is through home infusion but it is not [facility].  If she is indeed getting infusions through some home infusion, can you remove the order to schedule so it comes out of the schedulers workque?    Thank you,    Jazzy Haywood RN   [facility]  [facility] [phone]    ----- Message -----  From: Doreen Gupta  Sent: 4/5/2023  11:02 AM CDT  To: Ghazal Swann; Jazzy Haywood RN; #  Subject: RE: remicade                                     Hello,    Pt is not on service with [facility].     ThanksDoreen   ----- Message -----  From: Jazzy Haywood RN  Sent: 4/5/2023  10:46 AM CDT  To: Ghazal Swann; [facility]  Subject: remicade                                         Hi,   Does [facility] provide this pt with remicade infusions?    Thank you,    Jazzy Haywood RN   [facility]  [facility] [place] [phone]    ----- Message -----  From: Ghazal Swann  Sent: 4/5/2023   8:02 AM CDT  To: Jazzy Haywood RN  Subject: FW: Infusion Appt                                Justin Purcell,    Can you remove [place]'s orders from the work queue since she's doing home infusions?    Thank you,  Ghazal GRANDA [facility] - 2nd [facility]  [facility] Scheduling  [phone] (option 3, then option 2)    ----- Message -----  From: Robel Linda  Sent: 4/4/2023   5:06 PM CDT  To: Ghazal Swann  Subject: Infusion Appt                                    Hi GhazalVirginia was returning your call - she has these infusions set up via [facility]. Her next one is scheduled for Thursday 4/6/23 in the morning.    Any questions or other concerns feel free to give her a call back    Robel  [facility]   [facility]  [phone] Opt[address] Opt[address]

## 2023-04-05 NOTE — PROGRESS NOTES
Interventional Radiology Clinic Visit    Date of this visit: 4/7/2023    Abiola Matute returns for follow up post radioembolization.    Primary Physician: Linda Macdonald        History Of Present Illness:    Abiola Matute is a 57 year old female who presents with diagnosis of unresectable hepatocellular carcinoma (HCC) on a background of cirrhosis and Crohn's disease.      Patient is followed by Dr. Farhan Schilling from gastroenterology for her Crohn's disease and Dr. Cervantes from hepatology for her cirrhosis.  According to her gastroenterology notes, the Crohn's disease is in remission and predominantly perianal disease which is inactive.     In 12/2022, she was seen in the ED for chest pain, palpitations, and treated for cellulitis. CT chest at that time was negative for PE, but did show a new right hepatic lobe mass measuring ~3 cm. Subsequent MRI showed this was HCC. AFP was high. She is followed by Dr. Gupta from oncology. She was not a surgical candidate due to the presence of portal hypertension so we will perform targeted Theraspheres radioembolization of the mass on 2/27/2023.  She presents for her first post radioembolization follow-up today.    Has some fatigue and mild nausea after the procedure. These have resolved. No jaundice or leg swelling.  Had episode of what she calls gallbladder pain last week, which she has had occasionally, though the last episode was a few years ago.    She does mention she has developed a dull headache around dinner time, sometimes in morning, for 2 hours or so at a time. Frontal in location. Not taking analgesics. No change in medications. No change in diet.  Drinking a lot of fluid. Has some family stress over past few days but not before.  Headache has been about same over past weeks.  Started after the procedure.  Has not had this before.      Review of Systems:    As above    Past Medical/Surgical History:    Past Medical History:   Diagnosis Date     Alcohol  abuse      Alcoholic cirrhosis of liver with ascites      Anal fistula      Atypical ductal hyperplasia of breast 09/10/2010    ERT not recommended -left - and flat epithelial atypia-scheduled for breast biopsy 9/17/2010      Bifid uvula      Cholelithiasis      Contact perianal dermatitis and other eczema     recurrent - clobetasol      Crohn disease      Fear of flying      - gets Ativan prn.      HCC (hepatocellular carcinoma) (H) 2/1/2023     Hypertension      IBS (irritable bowel syndrome)      Past Surgical History:   Procedure Laterality Date     BIOPSY ANAL N/A 3/28/2019    anal biopsy and culure placement of seton - Dr Fleming     BIOPSY BREAST Left 09/17/2010    - scheduled with Dr. Varma      BIOPSY LIVER  2019     COLONOSCOPY  2006     COLONOSCOPY N/A 12/23/2014    Procedure: COMBINED COLONOSCOPY, SINGLE OR MULTIPLE BIOPSY/POLYPECTOMY BY BIOPSY;  Surgeon: Diane Fleming MD;  Location:  GI     COLONOSCOPY N/A 12/5/2019    Procedure: COLONOSCOPY, WITH POLYPECTOMY AND BIOPSY;  Surgeon: Farhan Schilling MD;  Location: [facility] GI     ENDOSCOPIC RETROGRADE CHOLANGIOPANCREATOGRAM N/A 4/24/2020    Procedure: ENDOSCOPIC RETROGRADE CHOLANGIOPANCREATOGRAPHY WITH, sledge removal,sphincterotomy, stent in gallbladder and pancreatic duct stent, and balloon dilation;  Surgeon: Guru Isac Kraft MD;  Location: [facility] OR     ESOPHAGOSCOPY, GASTROSCOPY, DUODENOSCOPY (EGD), COMBINED N/A 12/5/2019    Procedure: ESOPHAGOGASTRODUODENOSCOPY (EGD);  Surgeon: Farhan Schilling MD;  Location: [facility] GI     EXAM UNDER ANESTHESIA ANUS N/A 3/28/2019    Procedure: EXAM UNDER ANESTHESIA ANUS;  Surgeon: Diane Fleming MD;  Location:  OR     EXAM UNDER ANESTHESIA ANUS N/A 2/26/2021    Procedure: EXAM UNDER ANESTHESIA OF ANUS, SETON PLACEMENT, EXCISION OF SKIN BRIDGE;  Surgeon: Avtar Nam MD;  [place] [facility] [place]     EXAM UNDER ANESTHESIA ANUS N/A 1/6/2023    Procedure: EXAM UNDER ANESTHESIA, ANUS,  SETON EXCHANGE, partial fistulotomy;  Surgeon: Mary Dugan MD;  Location: [facility] OR     HYSTERECTOMY, VAGINAL  2006    with Dr. Licha Zhou - with BSO for fibroids      IR GALLBLADDER DRAIN PLACEMENT  4/22/2020     IR SIRT (SELECTIVE INTERNAL RADIO THERAPY)  2/21/2023     IR VISCERAL ANGIOGRAM  2/21/2023     IR VISCERAL EMBOLIZATION  2/27/2023     OPEN REDUCTION INTERNAL FIXATION ANKLE Left at age 27    plates and screws removed at age 36     Pelviscopy with removal of bilateral hydrosalpinges.  04/15/2010     ZZC APPENDECTOMY  at age 14       Current Medications:    Current Outpatient Medications   Medication Sig Dispense Refill     ammonium lactate (AMLACTIN) 12 % external cream Apply topically 2 times daily 385 g 3     furosemide (LASIX) 40 MG tablet TAKE 1 TABLET BY MOUTH  DAILY (Patient taking differently: Take 40 mg by mouth every morning) 90 tablet 3     inFLIXimab (REMICADE) 100 MG injection Inject into the vein once Next dose 12/16/22       methylPREDNISolone (MEDROL DOSEPAK) 4 MG tablet therapy pack Take as directed on package. 21 tablet 0     Milk Thistle-Dand-Fennel-Licor (MILK THISTLE XTRA) CAPS capsule Take 1 capsule by mouth 2 times daily (Patient not taking: Reported on 2/17/2023)       Multiple Vitamins-Minerals (MULTIVITAMIN & MINERAL PO) Take by mouth every morning       ondansetron (ZOFRAN) 4 MG tablet Take 1-2 tablets (4-8 mg) by mouth every 6 hours as needed for nausea (vomiting) 40 tablet 0     oxyCODONE (ROXICODONE) 5 MG tablet Take 1-2 tablets (5-10 mg) by mouth every 6 hours as needed for moderate to severe pain 10 tablet 0     pantoprazole (PROTONIX) 40 MG EC tablet Take 1 tablet (40 mg) by mouth daily 30 tablet 0     spironolactone (ALDACTONE) 100 MG tablet TAKE 1 TABLET BY MOUTH  DAILY (Patient taking differently: Take 100 mg by mouth every morning) 90 tablet 3     triamcinolone (KENALOG) 0.1 % external cream Apply to AA BID x 1-2 week then PRN only 80 g 3        Allergies:    Fish oil, Metronidazole, Naphthalenemethylamines, Ppd [tuberculin purified protein derivative], and Sulfa drugs    Family History:    Family History   Problem Relation Age of Onset     Breast Cancer Mother      Gastrointestinal Disease Mother      Heart Disease Father 57     Hypertension Maternal Grandmother      Substance Abuse Maternal Grandfather      Diabetes Maternal Grandfather      Diabetes Paternal Grandmother      Heart Disease Paternal Grandfather      Cancer Sister      Skin Cancer Sister      Substance Abuse Maternal Uncle      Substance Abuse Maternal Uncle      Suicide Niece      Suicide Niece      Anesthesia Reaction No family hx of      Thrombosis No family hx of        Social History:    Social History     Socioeconomic History     Marital status:      Spouse name: Albino     Number of children: 3     Years of education: 14   Occupational History     Occupation: unemployed   Tobacco Use     Smoking status: Never     Smokeless tobacco: Never   Vaping Use     Vaping status: Never Used   Substance and Sexual Activity     Alcohol use: Not Currently     Drug use: No     Comment: no herbal meds either     Sexual activity: Yes     Partners: Male     Birth control/protection: Post-menopausal     Comment: husb had vasectomy   Other Topics Concern      Service No     Blood Transfusions No     Caffeine Concern No     Comment: rarely drinks caffeine     Exercise Yes     Comment: does a lot of walking - 4x/week     Seat Belt Yes     Comment: always     Self-Exams Yes     Comment: SBE encouraged monthly     Parent/sibling w/ CABG, MI or angioplasty before 65F 55M? Yes   Social History Narrative    calcium - drinks 5-6 large glasses skim milk/day    flex sig/colonoscopy -at age 49    sun precautions - discussed    mammogram - needs every 2 years in her 30's, then yearly from then on    Td booster - 9/99 and 4/27/2010    pneumovax -at age 59    DEXA -when perimenopausal    stool  hemoccults - every year after age 40    ASA- start at age 39    mulvitamin - encouraged     Social Determinants of Health     Financial Resource Strain: Low Risk  (9/15/2020)    Overall Financial Resource Strain (CARDIA)      Difficulty of Paying Living Expenses: Not very hard   Food Insecurity: No Food Insecurity (9/15/2020)    Hunger Vital Sign      Worried About Running Out of Food in the Last Year: Never true      Ran Out of Food in the Last Year: Never true   Transportation Needs: No Transportation Needs (9/15/2020)    PRAPARE - Transportation      Lack of Transportation (Medical): No      Lack of Transportation (Non-Medical): No   Physical Activity: Unknown (9/15/2020)    Exercise Vital Sign      Days of Exercise per Week: 0 days   Stress: Stress Concern Present (9/15/2020)    [employer] - Occupational Stress Questionnaire      Feeling of Stress : Rather much   Social Connections: Unknown (9/15/2020)    Social Connection and Isolation Panel [NHANES]      Frequency of Communication with Friends and Family: More than three times a week      Frequency of Social Gatherings with Friends and Family: More than three times a week       Physical Exam:    CONSTITUTIONAL: healthy, alert and no distress.  PSYCHIATRIC: mentation appears normal and affect normal.  NEURO: Normal movements and speech.  EYES: No jaundice or pallor.  SKIN: No jaundice.   RESP: No audible cough or wheeze.      Laboratory Studies:    Lab Results   Component Value Date    HGB 14.3 03/29/2023    HGB 13.7 06/22/2021     Lab Results   Component Value Date     03/29/2023    PLT 97 06/22/2021     Lab Results   Component Value Date    WBC 2.4 03/29/2023    WBC 4.0 06/22/2021       Lab Results   Component Value Date    INR 1.14 03/29/2023    INR 1.28 10/27/2020       Lab Results   Component Value Date    PROTTOTAL 7.7 03/29/2023    PROTTOTAL 8.1 06/22/2021      Lab Results   Component Value Date    ALBUMIN 3.5  03/29/2023    ALBUMIN 3.3 10/03/2022    ALBUMIN 3.6 06/22/2021     Lab Results   Component Value Date    BILITOTAL 1.5 03/29/2023    BILITOTAL 1.8 06/22/2021     Lab Results   Component Value Date    BILICONJ 0.0 05/20/2014      Lab Results   Component Value Date    ALKPHOS 112 03/29/2023    ALKPHOS 104 06/22/2021     Lab Results   Component Value Date    AST 68 03/29/2023    AST 70 06/22/2021     Lab Results   Component Value Date    ALT 40 03/29/2023    ALT 52 06/22/2021       Lab Results   Component Value Date    CR 0.70 03/29/2023    CR 0.71 02/23/2021     Lab Results   Component Value Date    BUN 9.5 03/29/2023    BUN 12 11/11/2022    BUN 12 02/23/2021       AFP 3/29/23 = 149 (previously 271 on 2/14/23)    Imaging:     I personally reviewed and interpreted the MRI from 3/29/2023 and compared it to the intraprocedural CT from 2/21/2023 and pre procedure MRI from 1/19/2023.  The segment 7 lesion  demonstrates persistent arterial enhancement and measures 3.2 x 2.7 cm when measured on the portal venous phase, previously 4.1 x 2.9 cm on the intraprocedural CT portal venous phase, at which time it had grown compared to the preprocedural MRI where it measured 3.4 x 2.3 cm on the portal venous phase.  No new liver lesions are seen.    I also reviewed the lung images of the coronary CTA performed on 3/27/23 - only the inferior half of the lung fields are included but there are no lung mets in the visualized lung fields.      ASSESSMENT/PLAN:      Abiola Matute is a 57 year old female with a solitary unresectable HCC lesion in segment 7 who is 1 month post radioembolization.  Post treatment MRI demonstrates persistent enhancement of the lesion but the lesion is overall decreased in size, especially compared to the intraprocedural contrast-enhanced CT where the lesion had already grown compared to the preprocedural MRI.  The AFP tumor marker has decreased from 271 to 149, consistent with treatment response.  We will  continue to monitor imaging and tumor markers for now.    I discussed the imaging findings and lab findings with [place], and that the evolution of imaging changes after radioembolization can be quite slow over the many months.  I will see her in 3 months with another MRI liver and CT chest for re-staging since the recent coronary CTA only covered part of the lung fields and her tumor has been on the aggressive side based on prior growth and tumor markers.    Regarding her headache, we discussed that I do not think it is related to the radioembolization procedure.  However the cause is unclear.  I recommended that if it does not improve over the next couple of weeks to see her primary care doctor for further evaluation.  She agreed with this plan.        It was a pleasure seeing the patient.     Signed,    Yarely Diaz M.D.    Interventional Radiology  Department of Radiology  [facility]  Patient Care Team:  Linda Macdonald APRN CNP as PCP - General (Family Medicine)  Diane Mckay RN as Specialty Care Coordinator (Gastroenterology)  Sonia Pacheco [facility] as Pharmacist (Pharmacist Ambulatory Care)  Linda Macdonald APRN CNP as Assigned PCP  Jeannette Cervantes MD as MD (Gastroenterology)  Farhan Schilling MD as MD (Gastroenterology)  Farhan Schilling MD as Referring Physician (Gastroenterology)  Juan Crawford MD as MD (Dermatology)  Farhan Schilling MD as Assigned Gastroenterology Provider  Ezio Cazares MD as MD (Dermatology)  Ezio Cazares MD as MD (Dermatology)  Philly Gupta DO as Assigned Cancer Care Provider  Che Crockett, RN as Specialty Care Coordinator (Hematology & Oncology)  Patel Dumont as Assigned Behavioral Health Provider  Remi Leo RN as Registered Nurse (Gastroenterology)  Ezio Leal DPM as Assigned Surgical Provider        30 minutes spent by me on the date of the  encounter doing chart review, history and exam, imaging review, documentation and further activities per the note.      Video-Visit Details     Type of service:  Video Visit     Video Start and End Time:8:55 - 9:06 am    Originating Location (pt. Location): Home     Distant Location (provider location):  [facility] onsite     Platform used for Video Visit: [employer]

## 2023-04-07 ENCOUNTER — VIRTUAL VISIT (OUTPATIENT)
Dept: RADIOLOGY | Facility: CLINIC | Age: 58
End: 2023-04-07
Attending: RADIOLOGY
Payer: COMMERCIAL

## 2023-04-07 ENCOUNTER — TELEPHONE (OUTPATIENT)
Dept: GASTROENTEROLOGY | Facility: CLINIC | Age: 58
End: 2023-04-07
Payer: MEDICARE

## 2023-04-07 DIAGNOSIS — C22.0 HCC (HEPATOCELLULAR CARCINOMA) (H): Primary | ICD-10-CM

## 2023-04-07 PROCEDURE — 99214 OFFICE O/P EST MOD 30 MIN: CPT | Mod: VID | Performed by: RADIOLOGY

## 2023-04-07 NOTE — LETTER
4/7/2023         RE: Abiola Matute  [address] [facility] [address]        Dear Colleague,    Thank you for referring your patient, Abiola Matute, to the [facility]. Please see a copy of my visit note below.          Interventional Radiology Clinic Visit    Date of this visit: 4/7/2023    Abiola Matute returns for follow up post radioembolization.    Primary Physician: Linda Macdonald        History Of Present Illness:    Abiola Matute is a 57 year old female who presents with diagnosis of unresectable hepatocellular carcinoma (HCC) on a background of cirrhosis and Crohn's disease.      Patient is followed by Dr. Farhan Schilling from gastroenterology for her Crohn's disease and Dr. Cervantes from hepatology for her cirrhosis.  According to her gastroenterology notes, the Crohn's disease is in remission and predominantly perianal disease which is inactive.     In 12/2022, she was seen in the ED for chest pain, palpitations, and treated for cellulitis. CT chest at that time was negative for PE, but did show a new right hepatic lobe mass measuring ~3 cm. Subsequent MRI showed this was HCC. AFP was high. She is followed by Dr. Gupta from oncology. She was not a surgical candidate due to the presence of portal hypertension so we will perform targeted Theraspheres radioembolization of the mass on 2/27/2023.  She presents for her first post radioembolization follow-up today.    Has some fatigue and mild nausea after the procedure. These have resolved. No jaundice or leg swelling.  Had episode of what she calls gallbladder pain last week, which she has had occasionally, though the last episode was a few years ago.    She does mention she has developed a dull headache around dinner time, sometimes in morning, for 2 hours or so at a time. Frontal in location. Not taking analgesics. No change in medications. No change in diet.  Drinking a lot of fluid. Has some family stress  over past few days but not before.  Headache has been about same over past weeks.  Started after the procedure.  Has not had this before.      Review of Systems:    As above    Past Medical/Surgical History:    Past Medical History:   Diagnosis Date    Alcohol abuse     Alcoholic cirrhosis of liver with ascites     Anal fistula     Atypical ductal hyperplasia of breast 09/10/2010    ERT not recommended -left - and flat epithelial atypia-scheduled for breast biopsy 9/17/2010     Bifid uvula     Cholelithiasis     Contact perianal dermatitis and other eczema     recurrent - clobetasol     Crohn disease     Fear of flying      - gets Ativan prn.     HCC (hepatocellular carcinoma) (H) 2/1/2023    Hypertension     IBS (irritable bowel syndrome)      Past Surgical History:   Procedure Laterality Date    BIOPSY ANAL N/A 3/28/2019    anal biopsy and culure placement of seton - Dr Fleming    BIOPSY BREAST Left 09/17/2010    - scheduled with Dr. Varma     BIOPSY LIVER  2019    COLONOSCOPY  2006    COLONOSCOPY N/A 12/23/2014    Procedure: COMBINED COLONOSCOPY, SINGLE OR MULTIPLE BIOPSY/POLYPECTOMY BY BIOPSY;  Surgeon: Diane Fleming MD;  Location:  GI    COLONOSCOPY N/A 12/5/2019    Procedure: COLONOSCOPY, WITH POLYPECTOMY AND BIOPSY;  Surgeon: Farhan Schilling MD;  Location:  GI    ENDOSCOPIC RETROGRADE CHOLANGIOPANCREATOGRAM N/A 4/24/2020    Procedure: ENDOSCOPIC RETROGRADE CHOLANGIOPANCREATOGRAPHY WITH, sledge removal,sphincterotomy, stent in gallbladder and pancreatic duct stent, and balloon dilation;  Surgeon: Guru Isac Kraft MD;  Location:  OR    ESOPHAGOSCOPY, GASTROSCOPY, DUODENOSCOPY (EGD), COMBINED N/A 12/5/2019    Procedure: ESOPHAGOGASTRODUODENOSCOPY (EGD);  Surgeon: Farhan Schilling MD;  Location:  GI    EXAM UNDER ANESTHESIA ANUS N/A 3/28/2019    Procedure: EXAM UNDER ANESTHESIA ANUS;  Surgeon: Diane Fleming MD;  Location:  OR    EXAM UNDER ANESTHESIA  ANUS N/A 2/26/2021    Procedure: EXAM UNDER ANESTHESIA OF ANUS, SETON PLACEMENT, EXCISION OF SKIN BRIDGE;  Surgeon: Avtar Nam MD;  Location: [facility] OR    EXAM UNDER ANESTHESIA ANUS N/A 1/6/2023    Procedure: EXAM UNDER ANESTHESIA, ANUS, SETON EXCHANGE, partial fistulotomy;  Surgeon: Mary Dugan MD;  Location: [facility] OR    HYSTERECTOMY, VAGINAL  2006    with Dr. Licha Zhuo - with BSO for fibroids     IR GALLBLADDER DRAIN PLACEMENT  4/22/2020    IR SIRT (SELECTIVE INTERNAL RADIO THERAPY)  2/21/2023    IR VISCERAL ANGIOGRAM  2/21/2023    IR VISCERAL EMBOLIZATION  2/27/2023    OPEN REDUCTION INTERNAL FIXATION ANKLE Left at age 27    plates and screws removed at age 36    Pelviscopy with removal of bilateral hydrosalpinges.  04/15/2010    ZZC APPENDECTOMY  at age 14       Current Medications:    Current Outpatient Medications   Medication Sig Dispense Refill    ammonium lactate (AMLACTIN) 12 % external cream Apply topically 2 times daily 385 g 3    furosemide (LASIX) 40 MG tablet TAKE 1 TABLET BY MOUTH  DAILY (Patient taking differently: Take 40 mg by mouth every morning) 90 tablet 3    inFLIXimab (REMICADE) 100 MG injection Inject into the vein once Next dose 12/16/22      methylPREDNISolone (MEDROL DOSEPAK) 4 MG tablet therapy pack Take as directed on package. 21 tablet 0    Milk Thistle-Dand-Fennel-Licor (MILK THISTLE XTRA) CAPS capsule Take 1 capsule by mouth 2 times daily (Patient not taking: Reported on 2/17/2023)      Multiple Vitamins-Minerals (MULTIVITAMIN & MINERAL PO) Take by mouth every morning      ondansetron (ZOFRAN) 4 MG tablet Take 1-2 tablets (4-8 mg) by mouth every 6 hours as needed for nausea (vomiting) 40 tablet 0    oxyCODONE (ROXICODONE) 5 MG tablet Take 1-2 tablets (5-10 mg) by mouth every 6 hours as needed for moderate to severe pain 10 tablet 0    pantoprazole (PROTONIX) 40 MG EC tablet Take 1 tablet (40 mg) by mouth daily 30 tablet 0    spironolactone (ALDACTONE) 100 MG tablet  TAKE 1 TABLET BY MOUTH  DAILY (Patient taking differently: Take 100 mg by mouth every morning) 90 tablet 3    triamcinolone (KENALOG) 0.1 % external cream Apply to AA BID x 1-2 week then PRN only 80 g 3       Allergies:    Fish oil, Metronidazole, Naphthalenemethylamines, Ppd [tuberculin purified protein derivative], and Sulfa drugs    Family History:    Family History   Problem Relation Age of Onset    Breast Cancer Mother     Gastrointestinal Disease Mother     Heart Disease Father 57    Hypertension Maternal Grandmother     Substance Abuse Maternal Grandfather     Diabetes Maternal Grandfather     Diabetes Paternal Grandmother     Heart Disease Paternal Grandfather     Cancer Sister     Skin Cancer Sister     Substance Abuse Maternal Uncle     Substance Abuse Maternal Uncle     Suicide Niece     Suicide Niece     Anesthesia Reaction No family hx of     Thrombosis No family hx of        Social History:    Social History     Socioeconomic History    Marital status:      Spouse name: Albino    Number of children: 3    Years of education: 14   Occupational History    Occupation: unemployed   Tobacco Use    Smoking status: Never    Smokeless tobacco: Never   Vaping Use    Vaping status: Never Used   Substance and Sexual Activity    Alcohol use: Not Currently    Drug use: No     Comment: no herbal meds either    Sexual activity: Yes     Partners: Male     Birth control/protection: Post-menopausal     Comment: husb had vasectomy   Other Topics Concern     Service No    Blood Transfusions No    Caffeine Concern No     Comment: rarely drinks caffeine    Exercise Yes     Comment: does a lot of walking - 4x/week    Seat Belt Yes     Comment: always    Self-Exams Yes     Comment: SBE encouraged monthly    Parent/sibling w/ CABG, MI or angioplasty before 65F 55M? Yes   Social History Narrative    calcium - drinks 5-6 large glasses skim milk/day    flex sig/colonoscopy -at age 49    sun precautions - discussed     mammogram - needs every 2 years in her 30's, then yearly from then on    Td booster - 9/99 and 4/27/2010    pneumovax -at age 59    DEXA -when perimenopausal    stool hemoccults - every year after age 40    ASA- start at age 40    mulvitamin - encouraged     Social Determinants of Health     Financial Resource Strain: Low Risk  (9/15/2020)    Overall Financial Resource Strain (CARDIA)     Difficulty of Paying Living Expenses: Not very hard   Food Insecurity: No Food Insecurity (9/15/2020)    Hunger Vital Sign     Worried About Running Out of Food in the Last Year: Never true     Ran Out of Food in the Last Year: Never true   Transportation Needs: No Transportation Needs (9/15/2020)    PRAPARE - Transportation     Lack of Transportation (Medical): No     Lack of Transportation (Non-Medical): No   Physical Activity: Unknown (9/15/2020)    Exercise Vital Sign     Days of Exercise per Week: 0 days   Stress: Stress Concern Present (9/15/2020)    [employer] [place] [employer] - Occupational Stress Questionnaire     Feeling of Stress : Rather much   Social Connections: Unknown (9/15/2020)    Social Connection and Isolation Panel [NHANES]     Frequency of Communication with Friends and Family: More than three times a week     Frequency of Social Gatherings with Friends and Family: More than three times a week       Physical Exam:    CONSTITUTIONAL: healthy, alert and no distress.  PSYCHIATRIC: mentation appears normal and affect normal.  NEURO: Normal movements and speech.  EYES: No jaundice or pallor.  SKIN: No jaundice.   RESP: No audible cough or wheeze.      Laboratory Studies:    Lab Results   Component Value Date    HGB 14.3 03/29/2023    HGB 13.7 06/22/2021     Lab Results   Component Value Date     03/29/2023    PLT 97 06/22/2021     Lab Results   Component Value Date    WBC 2.4 03/29/2023    WBC 4.0 06/22/2021       Lab Results   Component Value Date    INR 1.14 03/29/2023    INR 1.28 10/27/2020        Lab Results   Component Value Date    PROTTOTAL 7.7 03/29/2023    PROTTOTAL 8.1 06/22/2021      Lab Results   Component Value Date    ALBUMIN 3.5 03/29/2023    ALBUMIN 3.3 10/03/2022    ALBUMIN 3.6 06/22/2021     Lab Results   Component Value Date    BILITOTAL 1.5 03/29/2023    BILITOTAL 1.8 06/22/2021     Lab Results   Component Value Date    BILICONJ 0.0 05/20/2014      Lab Results   Component Value Date    ALKPHOS 112 03/29/2023    ALKPHOS 104 06/22/2021     Lab Results   Component Value Date    AST 68 03/29/2023    AST 70 06/22/2021     Lab Results   Component Value Date    ALT 40 03/29/2023    ALT 52 06/22/2021       Lab Results   Component Value Date    CR 0.70 03/29/2023    CR 0.71 02/23/2021     Lab Results   Component Value Date    BUN 9.5 03/29/2023    BUN 12 11/11/2022    BUN 12 02/23/2021       AFP 3/29/23 = 149 (previously 271 on 2/14/23)    Imaging:     I personally reviewed and interpreted the MRI from 3/29/2023 and compared it to the intraprocedural CT from 2/21/2023 and pre procedure MRI from 1/19/2023.  The segment 7 lesion  demonstrates persistent arterial enhancement and measures 3.2 x 2.7 cm when measured on the portal venous phase, previously 4.1 x 2.9 cm on the intraprocedural CT portal venous phase, at which time it had grown compared to the preprocedural MRI where it measured 3.4 x 2.3 cm on the portal venous phase.  No new liver lesions are seen.    I also reviewed the lung images of the coronary CTA performed on 3/27/23 - only the inferior half of the lung fields are included but there are no lung mets in the visualized lung fields.      ASSESSMENT/PLAN:      Abiola Matute is a 57 year old female with a solitary unresectable HCC lesion in segment 7 who is 1 month post radioembolization.  Post treatment MRI demonstrates persistent enhancement of the lesion but the lesion is overall decreased in size, especially compared to the intraprocedural contrast-enhanced CT where the lesion  had already grown compared to the preprocedural MRI.  The AFP tumor marker has decreased from 271 to 149, consistent with treatment response.  We will continue to monitor imaging and tumor markers for now.    I discussed the imaging findings and lab findings with [place], and that the evolution of imaging changes after radioembolization can be quite slow over the many months.  I will see her in 3 months with another MRI liver and CT chest for re-staging since the recent coronary CTA only covered part of the lung fields and her tumor has been on the aggressive side based on prior growth and tumor markers.    Regarding her headache, we discussed that I do not think it is related to the radioembolization procedure.  However the cause is unclear.  I recommended that if it does not improve over the next couple of weeks to see her primary care doctor for further evaluation.  She agreed with this plan.        It was a pleasure seeing the patient.     SignedYarely M.D.    Interventional Radiology  Department of Radiology  [facility]  Patient Care Team:  Linda Macdonald APRN CNP as PCP - General (Family Medicine)  Diane Mckay RN as Specialty Care Coordinator (Gastroenterology)  Sonia Pacheco [facility] as Pharmacist (Pharmacist Ambulatory Care)  Linda Macdonald APRN CNP as Assigned PCP  Jeannette Cervantes MD as MD (Gastroenterology)  Farhan Schilling MD as MD (Gastroenterology)  Farhan Schilling MD as Referring Physician (Gastroenterology)  Juan Crawford MD as MD (Dermatology)  Farhan Schilling MD as Assigned Gastroenterology Provider  Ezio Cazares MD as MD (Dermatology)  Ezio Cazares MD as MD (Dermatology)  Philly Gupta DO as Assigned Cancer Care Provider  Che Crockett, RN as Specialty Care Coordinator (Hematology & Oncology)  Patel Dumont as Assigned Behavioral Health Provider  Remi Leo, KP  as Registered Nurse (Gastroenterology)  Ezio Leal DPM as Assigned Surgical Provider        30 minutes spent by me on the date of the encounter doing chart review, history and exam, imaging review, documentation and further activities per the note.      Video-Visit Details     Type of service:  Video Visit     Video Start and End Time:8:55 - 9:06 am    Originating Location (pt. Location): Home     Distant Location (provider location):  [facility] [place] onsite     Platform used for Video Visit: HappyBox

## 2023-04-07 NOTE — NURSING NOTE
Is the patient currently in the state of [place]? YES    Visit mode:VIDEO    If the visit is dropped, the patient can be reconnected by: VIDEO VISIT: Text to cell phone: [phone]    Will anyone else be joining the visit? NO      How would you like to obtain your AVS? MyChart    Are changes needed to the allergy or medication list? NO    Reason for visit: no

## 2023-04-07 NOTE — TELEPHONE ENCOUNTER
Screening Questions  BLUE  KIND OF PREP RED  LOCATION [review exclusion criteria] GREEN  SEDATION TYPE        Y Are you active on mychart?       ENRIQUE GAMEZ Ordering/Referring Provider?        [facility] What type of coverage do you have?      N Have you had a positive covid test in the last 14 days?     29.0 1. BMI  [BMI 40+ - review exclusion criteria]    Y  2. Are you able to give consent for your medical care? [IF NO,RN REVIEW]          N  3. Are you taking any prescription pain medications on a routine schedule   (ex narcotics: oxycodone, roxicodone, oxycontin,  and percocet)? [RN Review]          3a. EXTENDED PREP What kind of prescription?     N 4. Do you have any chemical dependencies such as alcohol, street drugs, or methadone?        **If yes 3- 5 , please schedule with MAC sedation.**          IF YES TO ANY 6 - 10 - HOSPITAL SETTING ONLY.     N 6.   Do you need assistance transferring?     N 7.   Have you had a heart or lung transplant?    N 8.   Are you currently on dialysis?   N 9.   Do you use daily home oxygen?   N 10. Do you take nitroglycerin?   10a.  If yes, how often?     11. [FEMALES]   Are you currently pregnant?    11a.  If yes, how many weeks? [ Greater than 12 weeks, OR NEEDED]    N 12. Do you have Pulmonary Hypertension? *NEED PAC APPT AT [facility] w/ MAC*     N 13. [review exclusion criteria]  Do you have any implantable devices in your body (pacemaker, defib, LVAD)?    N 14. In the past 6 months, have you had any heart related issues including cardiomyopathy or heart attack?     14a.  If yes, did it require cardiac stenting if so when?     N 15. Have you had a stroke or Transient ischemic attack (TIA - aka  mini stroke ) within 6 months?      N 16. Do you have mod to severe Obstructive Sleep Apnea?  [Hospital only]    N 17. Do you have SEVERE AND UNCONTROLLED asthma? *NEED PAC APPT AT [facility] w/MAC*     18. Are you currently taking any blood thinners?     18a. No. Continue to  "19.   18b. Yes/no Blood Thinner: No. Inform patient to \"follow up w/ ordering provider for bridging instructions.\"    N 19. Do you take the medication Phentermine?    19a. If yes, \"Hold for 7 days before procedure.  Please consult your prescribing provider if you have questions about holding this medication.\"     N  20. Do you have chronic kidney disease?      N  21. Do you have a diagnosis of diabetes?      23. Preferred LOCAL Pharmacy for Pre Prescription    [ LIST ONLY ONE PHARMACY]        [facility] - [place], [place] - [address] [place] [address] [place]    - CLOSING REMINDERS -    Informed patient they will need an adult    Cannot take any type of public or medical transportation alone    Conscious Sedation- Needs  for 6 hours after the procedure       MAC/General-Needs  for 24 hours after procedure    Pre-Procedure Covid test to be completed [[facility] PCR Testing Required]    Confirmed Nurse will call to complete assessment       - SCHEDULING DETAILS -  NO Hospital Setting Required? If yes, what is the exclusion?:    VREA  Surgeon    5/11/2023  Date of Procedure  Upper Endoscopy [EGD]  Type of Procedure Scheduled  [facility] [place] [facility]      MODERATE Sedation Type     NO PAC / Pre-op Required         Patient out of the country until end of the month. Please do no tdo pre-assessment call until 5/1.         "

## 2023-04-13 ENCOUNTER — PATIENT OUTREACH (OUTPATIENT)
Dept: GASTROENTEROLOGY | Facility: CLINIC | Age: 58
End: 2023-04-13
Payer: MEDICARE

## 2023-04-13 NOTE — PROGRESS NOTES
April 13, 2023    Called Leah Qureshi RN at [facility], Left message and contact info to return call regarding: office notes faxed

## 2023-04-13 NOTE — PROGRESS NOTES
Yolande RN from [facility] called, she is requesting last office notes to submit for a PA for Remicade.    [facility],  Please fax last clinic notes from 10/4/22 to (f) [phone].    Thank you.  Remi

## 2023-04-13 NOTE — PROGRESS NOTES
Note from office visit with Dr. Schilling 10/4/2022 printed and faxed to [facility] at [phone].

## 2023-04-18 ENCOUNTER — PATIENT OUTREACH (OUTPATIENT)
Dept: GASTROENTEROLOGY | Facility: CLINIC | Age: 58
End: 2023-04-18
Payer: MEDICARE

## 2023-04-18 LAB — SCANNED LAB RESULT: NORMAL

## 2023-04-18 NOTE — TELEPHONE ENCOUNTER
Patient discussed at ibd conference  Decreasing dosing to infliximab to 5mg/kg every 4 weeks       Complete a level day of the third infusion dosing of 5mg/kg       Inflammatory Bowel Disease Conference    Requesting Provider: Dr. Farhan Schilling       Indication/Diagnosis for consultation:  Short H&P:     GI History:    Treatment to Date: Immunotherapy    Colonoscopy Report: Yes      Pathology Report: Yes     Imaging completed: 3/29/23 mri liver      CT sHepatocellular carcinoma (H)      can: Yes                       MRI: Yes  PET scan: No                Lesion 1: There is a 3.7 x 2.9 arterially enhancing lesion in hepatic  segment 7 (series 9 image 16). It does washout on portal venous or    Discussion at Conference:     Conference Date: 04/18/23     Who was present: Surgery and Radiology   Dr Schilling   Clinical Trials:  No    Felt that she likely needed less infliximab as liver disease progressed. Overall consensus was to continue infliximab with trough concentration monitoring.     Given current infliximab concentration > 34 plan to decrease dose and trend trough.     Recommended and Follow up Plan: switch infusion to 5mg/kg every 4 weeks     Level at time of the third infusion at 5mg/'kg every 4 week interval     Times Spent Discussing: < 30 minutes    9

## 2023-04-21 ENCOUNTER — DOCUMENTATION ONLY (OUTPATIENT)
Dept: TRANSPLANT | Facility: CLINIC | Age: 58
End: 2023-04-21
Payer: MEDICARE

## 2023-04-21 NOTE — PROGRESS NOTES
[facility] LIVER TUMOR BOARD NOTE  Patient discussed at the multidisciplinary tumor board on 4/14/2023. The attendees may consist of representatives from hepatology, oncology, radiation oncology, surgical subspecialty including liver transplant and radiology.     DATE OF TUMOR BOARD: April 14, 2023     SCAN REVIEWED: 3/29/2023 MRI, Reviewed by Dr. Rush Frank     Discussion:   -Segment 7 arterially enhancing lesion, 3.5cm with washout, consistent with OPTN/LIRADS class 5b  -No new liver lesions seen  -Within Joshua criteria    Plan:  -Follow up with Dr. Shah 4/7, continue to monitor with MRI in 3 months

## 2023-04-25 ENCOUNTER — TELEPHONE (OUTPATIENT)
Dept: GASTROENTEROLOGY | Facility: CLINIC | Age: 58
End: 2023-04-25

## 2023-04-25 NOTE — TELEPHONE ENCOUNTER
Patient scheduled for Upper endoscopy (EGD) on 5/11/2023.     Discuss Covid policy.     Pre op exam needed? N/A    Arrival time: 1430. Procedure time 1530    Facility location: [facility]; [address] [place] [address], [address] Floor, [place] [id number]    Sedation type: Conscious sedation     Anticoagulations? No    Electronic implanted devices? No    Diabetic? No    Indication for procedure: variceal screening    Prep instructions sent via [employer]     Pre visit planning completed.    Leah Cervantes RN  Endoscopy Procedure Pre Assessment RN

## 2023-04-25 NOTE — TELEPHONE ENCOUNTER
Attempted to contact patient regarding upcoming Upper endoscopy (EGD) procedure on 5/11/2023 for pre assessment questions. No answer.     Left message to return call to [phone] #4    Leah Cervantes RN  Endoscopy Procedure Pre Assessment RN

## 2023-05-04 DIAGNOSIS — K74.60 DECOMPENSATION OF CIRRHOSIS OF LIVER (H): ICD-10-CM

## 2023-05-04 DIAGNOSIS — K72.90 DECOMPENSATION OF CIRRHOSIS OF LIVER (H): ICD-10-CM

## 2023-05-04 NOTE — TELEPHONE ENCOUNTER
Second attempt for pre-assessment prior to upcoming upper endoscopy (EGD)     No answer.  Left message to return call [phone] #4    "[employer]hart message sent    Candy Rivera RN  Endoscopy Procedure Pre Assessment RN

## 2023-05-04 NOTE — TELEPHONE ENCOUNTER
Pt returning call    Pre assessment questions completed for upcoming Upper endoscopy (EGD) procedure scheduled on 5/11/23    COVID policy reviewed.     Reviewed procedural arrival time 1430, procedure time 1530 and facility location [facility]; [address] [facility], [place] [address]    Designated  policy reviewed. Instructed to have someone stay 6 hours post procedure.     Reviewed procedure prep instructions.     Patient verbalized understanding and had no questions or concerns at this time.    Susan Wynn RN  Endoscopy Procedure Pre Assessment RN

## 2023-05-05 RX ORDER — SPIRONOLACTONE 100 MG/1
100 TABLET, FILM COATED ORAL EVERY MORNING
Qty: 90 TABLET | Refills: 3 | Status: SHIPPED | OUTPATIENT
Start: 2023-05-05 | End: 2024-04-23

## 2023-05-05 RX ORDER — FUROSEMIDE 40 MG
TABLET ORAL
Qty: 90 TABLET | Refills: 3 | Status: SHIPPED | OUTPATIENT
Start: 2023-05-05 | End: 2024-04-23

## 2023-05-11 ENCOUNTER — DOCUMENTATION ONLY (OUTPATIENT)
Dept: GASTROENTEROLOGY | Facility: CLINIC | Age: 58
End: 2023-05-11
Payer: MEDICARE

## 2023-05-11 ENCOUNTER — HOSPITAL ENCOUNTER (OUTPATIENT)
Facility: CLINIC | Age: 58
Discharge: HOME | End: 2023-05-11
Attending: INTERNAL MEDICINE | Admitting: INTERNAL MEDICINE
Payer: COMMERCIAL

## 2023-05-11 VITALS
OXYGEN SATURATION: 98 % | RESPIRATION RATE: 16 BRPM | HEART RATE: 58 BPM | DIASTOLIC BLOOD PRESSURE: 96 MMHG | SYSTOLIC BLOOD PRESSURE: 104 MMHG

## 2023-05-11 DIAGNOSIS — K70.31 ALCOHOLIC CIRRHOSIS OF LIVER WITH ASCITES (H): ICD-10-CM

## 2023-05-11 DIAGNOSIS — K50.113 CROHN'S DISEASE OF LARGE INTESTINE WITH FISTULA (H): Primary | ICD-10-CM

## 2023-05-11 PROCEDURE — 250N000011 HC RX IP 250 OP 636: Performed by: INTERNAL MEDICINE

## 2023-05-11 PROCEDURE — 999N000099 HC STATISTIC MODERATE SEDATION < 10 MIN: Performed by: INTERNAL MEDICINE

## 2023-05-11 PROCEDURE — 43235 EGD DIAGNOSTIC BRUSH WASH: CPT | Performed by: INTERNAL MEDICINE

## 2023-05-11 RX ORDER — PROCHLORPERAZINE MALEATE 5 MG
10 TABLET ORAL EVERY 6 HOURS PRN
Status: CANCELLED | OUTPATIENT
Start: 2023-05-11

## 2023-05-11 RX ORDER — NALOXONE HYDROCHLORIDE 0.4 MG/ML
0.4 INJECTION, SOLUTION INTRAMUSCULAR; INTRAVENOUS; SUBCUTANEOUS
Status: CANCELLED | OUTPATIENT
Start: 2023-05-11

## 2023-05-11 RX ORDER — ONDANSETRON 2 MG/ML
4 INJECTION INTRAMUSCULAR; INTRAVENOUS EVERY 6 HOURS PRN
Status: CANCELLED | OUTPATIENT
Start: 2023-05-11

## 2023-05-11 RX ORDER — FLUMAZENIL 0.1 MG/ML
0.2 INJECTION, SOLUTION INTRAVENOUS
Status: CANCELLED | OUTPATIENT
Start: 2023-05-11 | End: 2023-05-12

## 2023-05-11 RX ORDER — FENTANYL CITRATE 50 UG/ML
INJECTION, SOLUTION INTRAMUSCULAR; INTRAVENOUS PRN
Status: DISCONTINUED | OUTPATIENT
Start: 2023-05-11 | End: 2023-05-11 | Stop reason: HOSPADM

## 2023-05-11 RX ORDER — LIDOCAINE 40 MG/G
CREAM TOPICAL
Status: DISCONTINUED | OUTPATIENT
Start: 2023-05-11 | End: 2023-05-11 | Stop reason: HOSPADM

## 2023-05-11 RX ORDER — NALOXONE HYDROCHLORIDE 0.4 MG/ML
0.2 INJECTION, SOLUTION INTRAMUSCULAR; INTRAVENOUS; SUBCUTANEOUS
Status: CANCELLED | OUTPATIENT
Start: 2023-05-11

## 2023-05-11 RX ORDER — ONDANSETRON 4 MG/1
4 TABLET, ORALLY DISINTEGRATING ORAL EVERY 6 HOURS PRN
Status: CANCELLED | OUTPATIENT
Start: 2023-05-11

## 2023-05-11 RX ORDER — ONDANSETRON 2 MG/ML
4 INJECTION INTRAMUSCULAR; INTRAVENOUS
Status: DISCONTINUED | OUTPATIENT
Start: 2023-05-11 | End: 2023-05-11 | Stop reason: HOSPADM

## 2023-05-11 ASSESSMENT — ACTIVITIES OF DAILY LIVING (ADL): ADLS_ACUITY_SCORE: 33

## 2023-05-11 NOTE — PROGRESS NOTES
5/5/23 received fax from [employer]. Inflectra renewal order. 5/5/23 to 5/4/2024.    Sent via email to Remi to have signed.

## 2023-05-15 ENCOUNTER — MEDICAL CORRESPONDENCE (OUTPATIENT)
Dept: HEALTH INFORMATION MANAGEMENT | Facility: CLINIC | Age: 58
End: 2023-05-15

## 2023-05-15 NOTE — PROGRESS NOTES
Yolande return my call, she states she did not get any notification that pt's Inflectra was being de-escalated to 5mg/kg [id number]. She request new clinic notes or labs as to reason why. She as that we change the renewal order to the de-escalation dose.    Faxed signed order for de-escalation and scanned into chart.

## 2023-05-15 NOTE — PROGRESS NOTES
May 15, 2023    Called Braulio, Left message and contact info to return call regarding: renewal fax for Inflectra    Received Prescriber Orders for continuation of Inflectra 10mg/kg every 4 weeks. Per telephone Encounter 4/18/23 we are decreasing dosing to 5mg/kg every 4 weeks.

## 2023-05-16 NOTE — PROGRESS NOTES
Yolande called to confirm receipt of faxed order for de-escalating to 5mg/kg [id number]. Confirm next infusion on 6/1/23. Confirm PA covers new de-escalation dose.    2nd infusion 6/29/23    Need predict pK prior to 3rd infusion 7/27/23

## 2023-05-17 ENCOUNTER — TELEPHONE (OUTPATIENT)
Dept: TRANSPLANT | Facility: CLINIC | Age: 58
End: 2023-05-17

## 2023-05-17 ENCOUNTER — DOCUMENTATION ONLY (OUTPATIENT)
Dept: TRANSPLANT | Facility: CLINIC | Age: 58
End: 2023-05-17

## 2023-05-17 ENCOUNTER — DOCUMENTATION ONLY (OUTPATIENT)
Dept: GASTROENTEROLOGY | Facility: CLINIC | Age: 58
End: 2023-05-17

## 2023-05-17 ENCOUNTER — LAB (OUTPATIENT)
Dept: LAB | Facility: CLINIC | Age: 58
End: 2023-05-17
Payer: COMMERCIAL

## 2023-05-17 DIAGNOSIS — K70.31 ALCOHOLIC CIRRHOSIS OF LIVER WITH ASCITES (H): ICD-10-CM

## 2023-05-17 DIAGNOSIS — C22.0 HCC (HEPATOCELLULAR CARCINOMA) (H): ICD-10-CM

## 2023-05-17 LAB
AFP SERPL-MCNC: 54.6 NG/ML
ANION GAP SERPL CALCULATED.3IONS-SCNC: 12 MMOL/L (ref 7–15)
BUN SERPL-MCNC: 10.3 MG/DL (ref 6–20)
CALCIUM SERPL-MCNC: 9.3 MG/DL (ref 8.6–10)
CHLORIDE SERPL-SCNC: 102 MMOL/L (ref 98–107)
CREAT SERPL-MCNC: 0.63 MG/DL (ref 0.51–0.95)
DEPRECATED HCO3 PLAS-SCNC: 25 MMOL/L (ref 22–29)
GFR SERPL CREATININE-BSD FRML MDRD: >90 ML/MIN/1.73M2
GLUCOSE SERPL-MCNC: 89 MG/DL (ref 70–99)
INR PPP: 1.27 (ref 0.85–1.15)
POTASSIUM SERPL-SCNC: 3.9 MMOL/L (ref 3.4–5.3)
SODIUM SERPL-SCNC: 139 MMOL/L (ref 136–145)

## 2023-05-17 PROCEDURE — 82105 ALPHA-FETOPROTEIN SERUM: CPT

## 2023-05-17 PROCEDURE — 80321 ALCOHOLS BIOMARKERS 1OR 2: CPT | Mod: 90

## 2023-05-17 PROCEDURE — 80053 COMPREHEN METABOLIC PANEL: CPT

## 2023-05-17 PROCEDURE — 82248 BILIRUBIN DIRECT: CPT

## 2023-05-17 PROCEDURE — 36415 COLL VENOUS BLD VENIPUNCTURE: CPT

## 2023-05-17 PROCEDURE — 85610 PROTHROMBIN TIME: CPT

## 2023-05-17 PROCEDURE — 99000 SPECIMEN HANDLING OFFICE-LAB: CPT

## 2023-05-17 NOTE — TELEPHONE ENCOUNTER
Transplant Social Work Services Phone Call    Data: Virginia is in evaluation for liver transplant  Intervention: I received a call from Virginia on 5/16 inquiring about her status for listing. In the VM, she indicated that she thought that she had completed everything and will need to get into treatment, but did not realize that this was a barrier to listing.   I called Virginia back on 5/17 and left a VM. I received a call back from Virginia indicating that I am able to leave a detailed message on her VM.   I called Virginia and left a VM indicating recommendations to get into treatment and importance of treatment. I provided Conchis Cannon number and indicated that in the ideal world, someone would be able to finish treatment prior to being listed for transplant. I indicated that her transplant team will continue to monitor her medical status during this process.     Assessment: No new assessment. Patient has not engaged in recommended treatment. 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   Education provided by [facility]: Transplant and RENEE   Plan: This writer will be available for ongoing transplant evaluation and psychosocial support    RODGER Newby, [facility]  Liver Transplant   [facility]  Phone: [phone]  Pager: [phone]

## 2023-05-17 NOTE — PROGRESS NOTES
May 17, 2023 2:50 PM - Fabio Antunez RN:     This RN called patient, no answer, left VM with update that Virginia has completed the majority of her transplant evaluation but per her 2/17 RENEE evaluation needs to initiate & complete a outpatient CD program.    Provided contact information and request for call back.

## 2023-05-18 LAB
ALBUMIN SERPL BCG-MCNC: 3.6 G/DL (ref 3.5–5.2)
ALP SERPL-CCNC: 125 U/L (ref 35–104)
ALT SERPL W P-5'-P-CCNC: 30 U/L (ref 10–35)
AST SERPL W P-5'-P-CCNC: 70 U/L (ref 10–35)
BILIRUB DIRECT SERPL-MCNC: 0.86 MG/DL (ref 0–0.3)
BILIRUB SERPL-MCNC: 2.1 MG/DL
PROT SERPL-MCNC: 7.6 G/DL (ref 6.4–8.3)

## 2023-05-19 ENCOUNTER — TELEPHONE (OUTPATIENT)
Dept: BEHAVIORAL HEALTH | Facility: CLINIC | Age: 58
End: 2023-05-19

## 2023-05-19 LAB
LABORATORY REPORT: NORMAL
PLPETH BLD-MCNC: <10 NG/ML
POPETH BLD-MCNC: <10 NG/ML

## 2023-05-19 NOTE — TELEPHONE ENCOUNTER
----- Message from Susan Powell sent at 5/19/2023  2:22 PM CDT -----  Regarding: SERVICE INITIATION  SERVICE INITIATION SCHEDULED ON 5/25

## 2023-05-22 NOTE — TELEPHONE ENCOUNTER
I called [facility] and sent an email to get confirmation for Thursday 5/25 Service Initiation appt. I left this address and phone number.    Judi Dodd, MS, [facility]. LPC

## 2023-05-23 NOTE — TELEPHONE ENCOUNTER
"----- Message from SOLANGE Grimaldo sent at 5/23/2023  9:58 AM CDT -----  Regarding: change time on SI  Scheduling Request   change time on SI Thursday from 1:00 to 10:00   Patient name:  STEVEN MCNAMARA [[id number]]  Location of program:  [facility]: [id number] on Monday, Tuesday, and Wednesday, at 12:00 pm: PM to 2:00 pm  Individual Appt (SI) Date:  5/25       Time: 10:00 (change from 1:00\"    Provider: Dr. Juares    Number of visits:    1    Length of appt: 60 minutes  Visit type:  in person     Billing Type: part of program    Thank You,  Alana Dodd M.S., SOLANGE, LPC  Lead Counselor  [phone]           "

## 2023-05-24 NOTE — PROGRESS NOTES
Received call from Yolande Qureshi RN at [facility], she confirm receipt of POT form for Inflectra 5 mg/kg q4w. She states she confirm with her PA team and pt is approved for new de-escalation dosing.   1st @ 5mg/kg = 6/1/23  2nd @ 6/29/23  3rd @ 7/27/23 need predictr pK prior to 3rd dose

## 2023-05-24 NOTE — TELEPHONE ENCOUNTER
Patient calls to report that she is looking on going on medicaire   Wondering if she should stay on her 's insurance  Patient qualifies for medicaire due to disability   Concern of the dosing of 10mg/kg every 28 days might not be approved  Patient stools remain normal   Continues to have psorasis on her feet and heels   Has seen derm   Using steroid cream two weeks on and then 1 week off   Once she stops the cream symtoms reoccur  Canker sores develop after 3 weeks after last remicade dose   Has fissrue   [facility] placement on November 18  Questioning if she needs to change medication      CONSTITUTIONAL:  Well appearing, awake, alert, oriented to person, place, time/situation and in no apparent distress.  Pt. is objectively comfortable appearing and verbalizing in full, clear, effortless sentences.  ENMT: NC/AT.  Airway patent.  Nasal mucosa clear.  Moist mucous membranes.  Neck supple.  EYES:  Clear OU.  CARDIAC:  Normal rate, regular rhythm.  Heart sounds S1 S2.  No murmurs, gallops, or rubs.  RESPIRATORY:  Breath sounds clear and equal bilaterally.  No wheezes, no rales, no rhonchi.  GASTROINTESTINAL:  Abdomen soft, non-distended, non-tender.  No rebound, no guarding.  NEUROLOGICAL:  Alert and oriented to person/place/time/situation.  No focal deficits; no tremors noted.   MUSCULOSKELETAL:  Range of motion is not limited.  Gait stable.    SKIN:  Skin warm, dry.  PSYCHIATRIC:  Alert and oriented to person/place/time/situation.  Mood and affect WNL.  No apparent risk to self or others. CONSTITUTIONAL:  Well appearing, awake, alert, oriented to person, place, time/situation and in no apparent distress.  Pt. is objectively comfortable appearing and verbalizing in full, clear, effortless sentences.  ENMT: NC/AT.  Airway patent.  Nasal mucosa clear.  Moist mucous membranes.  Neck supple.  EYES:  Clear OU.  CARDIAC:  Normal rate, regular rhythm.  Heart sounds S1 S2.  No murmurs, gallops, or rubs.  RESPIRATORY:  Breath sounds clear and equal bilaterally.  No wheezes, no rales, no rhonchi.  GASTROINTESTINAL:  Abdomen soft, non-distended, non-tender.  No rebound, no guarding.  NEUROLOGICAL:  Alert and oriented to person/place/time/situation.  No focal deficits; no tremors noted.   MUSCULOSKELETAL:  Range of motion is not limited.  Gait stable.    SKIN:  Faint areas of erythema to forearms.  Skin warm, dry.  PSYCHIATRIC:  Alert and oriented to person/place/time/situation.  Mood and affect WNL.  No apparent risk to self or others.

## 2023-05-25 ENCOUNTER — HOSPITAL ENCOUNTER (OUTPATIENT)
Dept: BEHAVIORAL HEALTH | Facility: CLINIC | Age: 58
Discharge: HOME | End: 2023-05-25
Attending: FAMILY MEDICINE
Payer: COMMERCIAL

## 2023-05-25 PROCEDURE — H2035 A/D TX PROGRAM, PER HOUR: HCPCS

## 2023-05-25 NOTE — TELEPHONE ENCOUNTER
----- Message from SOLANGE Grimaldo sent at 5/25/2023 12:43 PM CDT -----  Regarding: add individual session and group sessions  Scheduling Request  individual session and group sessions                           #reminder this is OP level of care from the start, meeting 6 hours per week#    Patient Name:  STEVEN MCNAMARA [[id number]]  Location of programming: [place]  Start Date:  5/ 30 / 2023  Group: [id number] on Monday, Tuesday, and Wednesday, at 12:00 pm: PM to 2:00 pm    Attending Provider (MD): Dr. Juares    Number of visits to be scheduled: 24  Duration of Appointment in minutes: 120 minutes  Visit Type: Zoom - 2657    AND           AND               AND             AND             AND      Location of program:  [place]  Group: [id number] on Monday, Tuesday, and Wednesday, at 12:00 pm: PM to 2:00 pm  Individual Appt  Date:    5 /30    Time: 2:00    Provider: Dr. Juares,     Number of visits:    1    Length of appt: 60 minutes  Visit type:  in person     Billing Type: part of program    Thank You,  Alana Dodd M.S., SOLANGE, LPC  Lead Counselor  [phone]            The patient is a 70y Male complaining of tremor.

## 2023-05-25 NOTE — PROGRESS NOTES
INDIVIDUAL SESSION:  Attestation: Chrsi Juares MD - Medical Director - Provides oversight and supervision of care.     START TIME: 10:15  END TIME:11:44  TOTAL:  89 min     INDIVIDUAL SESSION  NOTE:  client attended Service Initiation/Orientation meeting today.  She is attending due to needing 6 months of sobriety and tx completion to get on the liver transplant list, but we discussed taht she also wants to learn healthy coping for life long sobriety.  She said she is a bit anxious about attending a group as she never has and that some of that went away meeting this counselor. I discussed group format so as to ease some the anxiety also.  I went over clients schedule with her explaining OP levels 2 and 3, and approximate time lines.   She said his  last use date 12/29/22 for alcohol .  Virginia completed PHQ-2/, scoring  0  and completed TAINA-2, scoring   1  and the PROMISE 10 scoring 31. She reports no thoughts of self harm.      We discussed the handbook and I emphasized the importance of regular attendance, reporting any use of substances to get the best level of care, and that we can request UA's.   I encouraged client to go through handbook and if any further questions to let this counselor know  Virginia and I will meet 5/30 for Treatment planning session and she will also begin group that day.     I) Comprehensive Update, [place] Reassessment, Initial Service Plan and vulnerable adult.  We discussed orientation materials, including client rights, treatment risks, grievance process, TB, HIV/AIDS, STD's.       A) Client seems eager to begin treatment to learn skills to remain substance free.  She reports she understands group schedule and orientation materials, but will look through the handbook again and ask questions if has any.       P) Review handbook. Review tx plan on 5/30  at 2:00.   Begin attending group sessions 5/30 at 12:00.     Judi Dodd, MS, LADC, LPC

## 2023-05-25 NOTE — PROGRESS NOTES
Client:  Abiola Matute  MRN: [id number]    Comprehensive Assessment UPDATE/IS/[facility]/[place] Re-Assess   Comprehensive Assessment Update: 5/25/2023    Comprehensive assessment dated 5/25/23  was reviewed and updates are as follows: None  Reason for admission today:  I need to do this to get on transplant list, but I want to learn healthy coping    Dates of last use and substance(s) used:  12/29/22  Patient does not have a history of opiate use.    Safety concerns:  None       Brief Biosocial Gambling Screen     1. During the past year (12 months) have you become restless, irritable or anxious when trying to cut down or stop gambling?  NO   2. During the past year (12 months) have you tried to keep your family or friends from knowing how much you gambled? NO   3. During the past year (12 months) did you have such financial trouble that you had to get help from family or friends? NO      Health Screening:  Given patient's past history, a medication, and physical condition, is there a fall risk?  No  Does the patient have any pain? No  Is the patient on a special diet? If yes, please explain: watch numbers of greens, nuts, popcorn due to Crohns  Does the patient have any concerns regarding your nutritional status? If yes, please explain: no  Has the patient had any appetite changes in the last 3 months?  No  Has the patient had any weight loss or weight gain in the last 3 months? No  Has the patient have a history of an eating disorder or been over-eating, avoiding meals, or inducing vomiting?  No  Does the patient have any dental concerns? (Problems with teeth, pain, cavities, braces)?  NO  Are immunizations up to date?  Yes  Any recent exposure to TB, Hepatitis, Measles, or Strep?  No  Client's BMI is good  Client informed of BMI?  yes is good  Normal, No Intervention    Dimension Scale Ratings:    Dimension 1 -  Acute Intoxication/Withdrawal: 0 - No Problem   No signs or symptoms of intoxication or withdrawal or  diminishing signs or symptoms.  Dimension 2 - Biomedical: 2 - Moderate Problem   The patient has difficulty tolerating and coping with physical problems or has other biomedical problems that interfere with recovery and treatment. Physical manifestation of medical issues from substance use.  Dimension 3 - Emotional/Behavioral/Cognitive Conditions: 1 - Minor Problem   The patient has impulse control and coping skills. The patient presents a mild to moderate risk of harm to self or others or displays symptoms of emotional, behavioral, or cognitive problems.   Dimension 4 - Readiness to Change:  1 - Minor Problem   The patient is motivated, with active reinforcement, to explore treatment and strategies for change but is ambivalent about illness or need for change.   Dimension 5 - Relapse/Continued Use/ Continued Problem Potential: 2 - Moderate Problem   The patient has minimal recognition and understanding of relapse and recidivism issues and displays moderate vulnerability for further substance use or mental health problems.   Dimension 6 - Recovery Environment:  2 - Moderate Problem   The patient is engaged in structured, meaningful activity, but peers, family, significant other and living environment are unsupportive or there is criminal justice involvement by the patient or among the patient s peers, or in the patient s living environment.    Initial Service Plan (ISP)    Immediate health, safety, and preliminary service needs identified and plan includes the following based on available information from clients, referral sources, and collateral information.    Safety (SI, SIB, suicide attempts, aggressive behaviors):  NO  A safety and risk management plan has been developed including: When the patient identifies the following:  Wish to die    The following is recommended:   Complete/Review/Update Safety Plan    Safety Plan:  Adult Short Safety Plan:   Name: Abiola Matute  YOB: 1965  Date: May 25,  2023   My primary care provider: Linda Macdonald  My primary care clinic: [facility]  My prescriber: Dr Cervantes or Dr. Schilling  Other care team support:  Patel Villareal   My Triggers:   Stress due to relationships or really anything or celebrations or reasons to celebrate     Additional People, Places, and Things that I can access for support: any member of my family, but especially my .  Walks, walking trails.  Patel Villareal           What is important to me and makes life worth living: Or .         GREEN    Good Control  1. I feel good  2. No suicidal thoughts   3. Can work, sleep and play      Action Steps  1. Self-care: balanced meals, exercising, sleep practices, etc.  2. Take your medications as prescribed.  3. Continue meetings with therapist and prescriber.  4.  Do the healthy things that I enjoy.             YELLOW  Getting Worse  I have ANY of these:  1. I do not feel good  2. Difficulty Concentrating  3. Sleep is changing  4. Increase/Change in my thoughts to hurt self and/or others, but I can still manage and not act on it.   5. Not taking care of self.               Action Steps (in addition to the above):  1. Inform your therapist and psychiatric prescriber/PCP.  2. Keep taking your medications as prescribed.    3. Turn to people you can ask for help.  4. Use internal coping strategies -see below.  5. Create safe environment:              RED  Get Help  If I have ANY of these:  1. Current and uncontrollable thoughts and/or behaviors to hurt self and/or others.      Actions to manage my safety  1. Contact your emergency person Albino Matute  2. Call or Text [phone]  3. Call my crisis team- [facility] [phone]  3. Or Call [phone] or go to the emergency room right away           My Internal Coping Strategies include the following:  belly breathing and exercise    [End for Brief Safety Plan]     Safety Concerns  How To Identify Situations That Make Your Mental Health Worse:  Triggers are  things that make your mental health worse.  Look at the list below to help you find your triggers and what you can do about them.     1. Identify Early Warning Signs:    Sometimes symptoms return, even when people do their best to stay well. Symptoms can develop over a short period of time with little or no warning, but most of the time they emerge gradually over several weeks.  Early warning signs are changes that people experience when a relapse is starting. Some early warning signs are common and others are not as common.   Common Early Warning Signs:    Feeling tense or nervous and Feeling depressed or low     2. Identify action steps to take when warning signs are noticed:    Taking Action- It is important to take action if you are experiencing early warning signs of a relapse.  The faster you act, the more likely it is that you can avoid a full relapse.  It is helpful to identify several specific ways to cope with symptoms.      The following is my list of symptoms and coping strategies that I can use when they are present:    Symptom Coping Strategies   Anxiety -Talk with someone in your support system and let him or her know how you are feeling.  -Use relaxation techniques such as deep breathing or imagery.  -Use positive affirmations to counteract negative self-talk such as  I am learning to let go of worry.    Depression - Schedule your day; include activities you have to do and activities you enjoy doing.  - Get some exercise - walk, run, bike, or swim.  - Give yourself credit for even the smallest things you get done.   Sleep Difficulties   - Go to sleep at the same time every day.  - Do something relaxing before bed, such as drinking herbal tea or listening to music.  - Avoid having discussions about upsetting topics before going to bed.   Delusions   - Distract yourself from the disturbing thought by doing something that requires your attention such as a puzzle.  - Check out your beliefs by talking to  someone you trust.    Hallucinations   - Use headphones to listen to music.  - Tell voices to  stop  or say to yourself,  I am safe.   - Ignore the hallucinations as much as possible; focus on other things.   Concentration Difficulties - Minimize distractions so there is only one thing for you to focus on at a time.    - Ask the person you are having a conversation with to slow down or repeat things you are unsure of.         .  Patient consented to co-developed safety plan.  Safety and risk management plan was completed.  Patient agreed to use safety plan should any safety concerns arise.  A copy was given to the patient.     Health:  Client does NOT have health issues that would impede participation in treatment    Transportation: Client will be transported to treatment by self.     Other:  None    Patient does not have any identified barriers to participating in referred services.    Vulnerable Adult Assessment    Does the patient possess a physical or mental infirmity or other physical, mental, or emotional dysfunction?  No. Patient is not a vulnerable adult.    This patient is not a functional Vulnerable Adult according to [place] Statute 626.5572 subdivision 21.      Treatment suggestions for client for the time period until the    initial treatment planning session:  look through handbook,     [place] Re-Assessment:     Have you ever wished you were dead or that you could go to sleep and not wake up? Lifetime?  No   Past Month?  No     Have you actually had any thoughts of killing yourself?  Lifetime?  No   Past Month?  No     Have you been thinking about how you might do this? Lifetime?  No   Past Month?  No     Have you had these thoughts and had some intention of acting on them?  Lifetime?  No   Past Month?  No     Have you started to work out the details of how to kill yourself?  Lifetime?  N/A   Past Month?  N/A     Do you intend to carry out this plan?   N/A     When you have the thoughts how long do  they last?   N/A    Are there things - anyone or anything (ie Family, Yarsani, pain of death) that stopped you from wanting to die or acting on thoughts of suicide?   Does not apply        2008  [employer]  Used with permission by Rocio Conroy, PhD.      Judi Dodd, [facility]       5/25/2023     10:23 AM

## 2023-05-30 ENCOUNTER — HOSPITAL ENCOUNTER (OUTPATIENT)
Dept: BEHAVIORAL HEALTH | Facility: CLINIC | Age: 58
Discharge: HOME | End: 2023-05-30
Attending: FAMILY MEDICINE
Payer: COMMERCIAL

## 2023-05-30 PROCEDURE — H2035 A/D TX PROGRAM, PER HOUR: HCPCS

## 2023-05-30 PROCEDURE — H2035 A/D TX PROGRAM, PER HOUR: HCPCS | Mod: HQ

## 2023-05-30 NOTE — TELEPHONE ENCOUNTER
----- Message from SOLANGE Grimaldo sent at 5/30/2023  7:11 AM CDT -----  Regarding: FW: add individual session and group sessions  Per message below, please put an individual session in person for client for today at 2:00.  You may not have seen it.    ALSO, I am so sorry, I put her group sessions as Zoom sessions but they all, including today, need to be changed to in person.    THANKS   Alana  ----- Message -----  From: Judi Dodd LADC  Sent: 5/25/2023  12:51 PM CDT  To: [facility]  Subject: add individual session and group sessions        Scheduling Request  individual session and group sessions                           #reminder this is OP level of care from the start, meeting 6 hours per week#    Patient Name:  STEVEN MCNAMARA [[id number]]  Location of programming: [place]  Start Date:  5/ 30 / 2023  Group: [id number] on Monday, Tuesday, and Wednesday, at 12:00 pm: PM to 2:00 pm    Attending Provider (MD): Dr. Juares    Number of visits to be scheduled: 24  Duration of Appointment in minutes: 120 minutes  Visit Type: Zoom - 2657    AND           AND               AND             AND             AND      Location of program:  [place]  Group: [id number] on Monday, Tuesday, and Wednesday, at 12:00 pm: PM to 2:00 pm  Individual Appt  Date:    5 /30    Time: 2:00    Provider: Dr. Juares,     Number of visits:    1    Length of appt: 60 minutes  Visit type:  in person     Billing Type: part of program    Thank You,  Alana Dodd M.S., [facility], [facility]  Lead Counselor  [phone]

## 2023-05-30 NOTE — PROGRESS NOTES
[facility]  Adult Substance Use Disorder Program  Treatment Plan Requirements     Attestation: Dr. Chris Juares - Provides oversight and supervision of care    These services are provided by the facility for each patient/client according to the individual's treatment plan:    Individual and group counseling    Education    Transition services    Services to address any co-occurring mental illness    Service coordination    Criteria for discharge:  Patients/clients are discharged from the program following completion of the entire program including all phases of treatment or acceptance of other post-treatment referrals such as sober supportive living, or aftercare at other facilities.  Patients/clients may also be discharged for inappropriate behavior or substance use.      Favorable Discharge - Patients/clients have completed agreed upon treatment goals, understand their diagnosis and appear motivated for continued recovery.    Guarded Discharge - Patients/clients have demonstrated some understanding of their diagnosis and recovery process, and have completed some of their treatment goals.  This prognosis also includes patients/clients who have completed some treatment goals but have not made commitment to community support or follow through with referrals.    Unfavorable Discharge - Patients/clients have not completed agreed upon treatment goals due to their own choice, have limited understanding of their diagnosis, and have shown minimal or inconsistent behavior conducive to recovery.  Those patients/clients discharged due to behavioral problems will also be unfavorable discharges.    Adult RENEE Treatment Plan                                Patient Name:  Juju ESCALANTE  MRN: [id number]  :63   Identified Gender: female  Admit Date: /  Current Treatment Phase: OP  Last Updated: Initial     SUBSTANCE USE DISORDER(S): Alcohol Use Disorder Severe, 303.90 (F10.20), in  "early remission      Dimension 1: Acute Intoxication/Withdrawal Potential, Risk Level: 0    Needs identified from Comprehensive Assessment Summary: The pt reports her last use of alcohol was 12/29/22  Update: Initial    Date of last use: 12/29/22  Patient currently receiving medication assisted therapy (MAT): No  Current or recent withdrawal symptoms: No    Focus area: Client is attending OP program to work on sobriety/abstinence in order to be eligible for a liver transplant  Date Assigned: 5/30/23  Clinical Goal: remain abstinent throughout your OP tx or report any use to this counselor and group  Clients Goal:  \"I intend to continue being sober\"  Goal needs to be completed prior to discharge? [x]  Treatment Strategies:   Report any (1 or more) substance use to counselor to determine any changes that need to be made to plan  Target Date:11/20/23  Date Completed:         Dimension 2: Biomedical Conditions/Complications, Risk Level: 3    Needs identified from Comprehensive Assessment Summary: She is being considered for a potential liver transplant.  She has cancer of the liver and also has Crohns disease.    Her PCP is Linda Macdonald and she also works closely with Dr. Jeannette Cervantes and Dr. Syed  Update: Initial     Focus area: Client is working with Liver transplant team, on his weight loss and will get PeTH test as recommended  Clinical Goal: Continue to follow recommendations of Medical team and report any changes that affect your treatment  Patient Goal:  \"I will continue to work with my care team, to ensure success and I will report anything that effects my treatment\"    Goal needs to be completed prior to discharge? []  Methods/Strategies (must include amount and frequency):   1. [place] will report any changes (1x or more) to physical health to counselor.   2. [place] will attend all scheduled doctor's appointments and continue to discuss health issues and any weight changes   3. [place] will take medications as " "prescribed and report any (1x or more) changes  4. Take PEth test and Drug screen test as recommended 1x or more  Target Date: 11/20/23  Completion Date:      Dimension 3: Emotional/Behavioral/Cognitive, Risk Level: 1    Needs identified from Comprehensive Assessment Summary:  At SI her PHQ-2 score was 0 and his TAINA-2  score was 1.  PROMISE 10 was 31.    She reports no previous mental health diagnoses, but said she has some anxiety around her diagnosis    Update: initial    Focus area: Client reports sometimes getting anxious, especially with things like starting a new group or around her diagnosis  Clinical Goal:Gain insight into healthy coping for symptoms of anxiety   Patient Goal:            Goal needs to be completed prior to discharge? []  Methods/Strategies (must include amount and frequency):    1. [place] will attend 6 or MH skills group sesssions with Darinel Au  Target Date: 7/ 30/23  Completion Date:   3: Assignment(s):   Discussion of emotions and healthy coping, assignment 1x    participate in discussion of mindfulness   Participated in 2 or more mindfulness meditations  Target Date: 9/30  Completion Date:        Dimension 4: Readiness to Change, Risk Level: 0    Needs identified from Comprehensive Assessment Summary: Although [place] needs treatment due to recommendations of LT team, she reports she also wants to learn about healthy coping.    Update: Initial     Focus area: Client may have external motivation for sobriety, due to recommendations by LT tearm, he may find it helpful to learn about the disease and internal motivation    Clinical Goal:Enhance understanding of the disease and motivation for ongoing sobriety  Patient Goal: \"I am open to learning about this disease\"  Goal needs to be completed prior to discharge? []  Methods/Strategies (must include amount and frequency):   1.[place] will attend 3, 2-hour groups per week. Days/Times: Monday, Tuesday and Wed  After completion of  6-7 weeks of " "MH group Client will attend 2-2 hour groups per week on Mondays and Wednesdays   2.[place] will contact staff if unable to attend at [phone].  Target Date:6/15  Completion Date:   Assignments:    \"I am your disease\" 1x  Consequences of Use assignment 1x or more  Target Date:9/30  Completion Date:       Dimension 5: Relapse/Continued Use/Continued Problem Potential, Risk Level: 1    Needs identified from Comprehensive Assessment Summary: [place] has had no previous treatments or detoxifications.  She needs to gain insight and understanding into warning signs, triggers and healthy coping for lifelong sobriety.   Update: Initial     Focus area: Gain insight and coping tools for  relapse prevention, she wants ongoing sobriety   Clinical Goal:  Gain insight into and develop  coping skills and relapse prevention strategies to utilize when experiencing trigger, cravings and or urges  Client Goal: \"I cannot drink with a transplant or to get a transplant, I am willing to learn all I can\"  Goal needs to be completed prior to discharge? []  Methods/Strategies (must include amount and frequency):   1.[place] will share during each session's check-in any urges and addictive thinking to better understand their pattern of use and to prevent return to use.  2 Assignment(s):   List warning signs and triggers, share your top 5 and healthy coping strategies 1x  Urge surfing for cravings 1x    Target Date: 5/15  Completion Date:      Dimension 6: Recovery Environment, Risk Level: 1      Needs identified from Comprehensive Assessment Summary: she has supportive family and is involved in activities, but does not attend any community support     Update: Initial       Focus area/need:[place] is not involved in peer support and does have some structure,but wants to continue to work on balance and structure  Clinical Goal: gain information on resources and  expand structure and support in your life  Client Goal: \"I don't know much about building " "support\"  Goal needs to be completed prior to discharge? []  Methods/Strategies (must include amount and frequency):   1. Receive 2 or more resource handouts on community support and discuss 2 or more times  . Assignment(s):  Goal setting assignment:  Finding Balance in recovery 1x    Target Date: 8/24  Completion Date:           Resources  Resources to which the patient is being referred for problems when problems are to be addressed concurrently by another provider: No Referrals Needed     [x] Contact insurance to ensure referrals are in network    Vulnerable Adult Review   [X] Review of the facility Abuse Prevention plan was reviewed with the patient   [X] No individual abuse plan is necessary   [ ] In addition to the facility Abuse Prevention plan, an Individual Abuse Plan will be put in place     [place] attests their date of last use as 12/29/22 . She attests they have participated in the creation of this treatment plan. Client has been provided a copy of this treatment plan and is in agreement with how this plan is written and will be stored in the electronic record.                                                                                                 Patient Signature: _________________________________ Date: _________    Counselor Signature: ______________________________ Date: _________          These services are provided by the facility for each patient/client according to the individual's treatment plan:    Individual and group counseling    Education    Transition services    Services to address any co-occurring mental illness    Service coordination    Criteria for discharge:  Patients/clients are discharged from the program following completion of the entire program including all phases of treatment or acceptance of other post-treatment referrals such as sober supportive living, or aftercare at other facilities.  Patients/clients may also be discharged for inappropriate behavior or substance " use.      Favorable Discharge - Patients/clients have completed agreed upon treatment goals, understand their diagnosis and appear motivated for continued recovery.    Guarded Discharge - Patients/clients have demonstrated some understanding of their diagnosis and recovery process, and have completed some of their treatment goals.  This prognosis also includes patients/clients who have completed some treatment goals but have not made commitment to community support or follow through with referrals.    Unfavorable Discharge - Patients/clients have not completed agreed upon treatment goals due to their own choice, have limited understanding of their diagnosis, and have shown minimal or inconsistent behavior conducive to recovery.  Those patients/clients discharged due to behavioral problems will also be unfavorable discharges.

## 2023-05-30 NOTE — PROGRESS NOTES
"Comprehensive Assessment Summary    Attestation:   Jonathan Juares MD - Medical Director - Provides oversight and supervision of care.    Patient:  Abiola Matute \"[place]\"  MRN; [id number]  : [birth year]  Age: 57 old Sex: female       Client meets criteria for:  Alcohol Use Disorder   303.90 (F10.20) Moderate In a controlled environment.      Dimension One: Acute Intoxication/Withdrawal Potential     Ratin - No Problem   (Consider the client's ability to cope with withdrawal symptoms and current state of intoxication)    No signs or symptoms of intoxication or withdrawal.  She reports her last use as 2022      Dimension Two: Biomedical Condition and Complications    Ratin - Moderate Problem  (Consider the degree to which any physical disorder would interfere with treatment for substance abuse, and the client's ability to tolerate any related discomfort; determine the impact of continued chemical use on the unborn child if the client is pregnant)    She reports having liver cancer and Crohn's disease.  \"right now I am doing pretty well\" The patient has difficulty tolerating and coping with physical problems, due to medical issues from substance use.    Dimension Three: Emotional/Behavioral/Cognitive Conditions & Complications  Rating:  - 1 - Minor Problem   (Determine the degree to which any condition or complications are likely to interfere with treatment for substance abuse or with functioning in significant life areas and the likelihood of risk of harm to self or others)   The patient has impulse control and coping skills. The patient presents a mild to moderate risk of harm to self or others or displays symptoms of emotional, behavioral, or cognitive problems.   she reports no psychiatric Hospitalizations and no current mental health services/providers   The pt reports no SI/SA or SIB in his lifetime.  She reports no previous diagnosis for MH.    At [facility] her PHQ-2 score was 0 and his TAINA-2 score was 1.  " "PROMISE 10 was  31:  She reports that at this time she is dealing with her mother's living situation and health issues. She reports that her father has passed away and that she is estranged from 1 sister.   \"e dealt with some things from her  sexually, as far as him exposing himself to myself and my girls.  We have decided that there is no way we're going to let them around him.\"    Dimension Four: Treatment Acceptance/Resistance     Rating:  -   1 - Minor Problem    (Consider the amount of support and encouragement necessary to keep the client involved in treatment)   The patient is motivated, to explore treatment.  She said \"of course I am partly here to fulfill requirements for liver transplant, but I want to learn healthy coping for lifelong sobriety.    Dimension Five: Continued Use/Relaspe Prevention     Ratin - Moderate Problem   (Consider the degree to which the client's recognizes relapse issues and has the skills to prevent relapse of either substance use or mental health problems)   Patient reports family members and her  are concerned about their substance use.  Patient reports their recovery goals are \"abstinence forever.\"  She has had no previous detoxes or treatments and needs to gain insight into healthy coping for relapse prevention.    Dimension Six: Recovery Environment     Rating:    3 - Severe Problem   (Consider the degree to which key areas of the client's life are supportive of or antagonistic to treatment participation and recovery)     Patient reports they are involved in community of holden activities.  They reports spirituality impacts recovery in the following ways:  \"There was some changes as far as .  We do belong to a [employer] but it's been a number of years since we've been.\"  Patient believes her spirituality and sobriety are connected. Patient reported having 3 children, all adults.  Patient has 2 grandchildren.     Patient's current living/housing situation " "involves staying in own home/apartment.  The immediate members of family and household include Albino Matute, 63,Spouse and son    Patient had 3 full siblings and patient was the 3rd child. Patient reported that their childhood was \"humbly.  We needed assistance here and there, financially.  I went to a [employer] school.  I felt very safe, my parents have always been together until my dad passed away.\"  Patient described their current relationships with family of origin as \"very good.  following recreational/leisure activities: camping, cooking, boating, playing board games and card games.  Patient is currently disabled due to her Crohn's Disease.  Patient reports their income is obtained through SSDI disability; spouse.  Patient does not identify finances as a current stressor.      I have reviewed the information on the assessment, psychosocial and medical history and checklist:        it is currentNo concerns at this time, as last use reported as:    . Shows no signs of use or withdrawal            "

## 2023-05-30 NOTE — PROGRESS NOTES
INDIVIDUAL SESSION  Start time: 2:00  End time:  2:55    D) Client and I did an individual session to discuss her treatment goals and we made her treatment plan.  She discussed her first group experience and said it was helpful that the group was smaller today, and now she feels less anxious.  Client said she has no questions and looks forward to continuing group sesssions  P) begin working on goals and plans.  Attend group Wed at 12:00      Judi Dodd MS, LADC. LPC

## 2023-05-30 NOTE — TELEPHONE ENCOUNTER
----- Message from SOLANGE Grimaldo sent at 5/30/2023 12:33 PM CDT -----  Regarding: FW: add individual session and group sessions  Client has not been put in the individual session per my message below  It is needed for 2:00 today  Thanks  Alana    ----- Message -----  From: Judi Dodd LAD  Sent: 5/30/2023   7:15 AM CDT  To: [facility]  Subject: FW: add individual session and group sessions    Per message below, please put an individual session in person for client for today at 2:00.  You may not have seen it.    ALSO, I am so sorry, I put her group sessions as Zoom sessions but they all, including today, need to be changed to in person.    THANKS   Alana  ----- Message -----  From: Judi Dodd LAD  Sent: 5/25/2023  12:51 PM CDT  To: [facility]  Subject: add individual session and group sessions        Scheduling Request  individual session and group sessions                           #reminder this is OP level of care from the start, meeting 6 hours per week#    Patient Name:  STEVEN MCNAMARA [[id number]]  Location of programming: [facility]  Start Date:  5/ 30 / 2023  Group: [id number] on Monday, Tuesday, and Wednesday, at 12:00 pm: PM to 2:00 pm    Attending Provider (MD): Dr. Juares    Number of visits to be scheduled: 24  Duration of Appointment in minutes: 120 minutes  Visit Type: Zoom - [id number]    AND           AND               AND             AND             AND      Location of program:  [facility]  Group: [id number] on Monday, Tuesday, and Wednesday, at 12:00 pm: PM to 2:00 pm  Individual Appt  Date:    5 /30    Time: 2:00    Provider: Dr. Juares,     Number of visits:    1    Length of appt: 60 minutes  Visit type:  in person     Billing Type: part of program    Thank You,  Alana Dodd M.S., [facility], [facility]  Lead Counselor  [phone]

## 2023-05-31 ENCOUNTER — HOSPITAL ENCOUNTER (OUTPATIENT)
Dept: BEHAVIORAL HEALTH | Facility: CLINIC | Age: 58
Discharge: HOME | End: 2023-05-31
Attending: FAMILY MEDICINE
Payer: COMMERCIAL

## 2023-05-31 PROCEDURE — H2035 A/D TX PROGRAM, PER HOUR: HCPCS | Mod: HQ

## 2023-05-31 NOTE — GROUP NOTE
Psychoeducation Group Documentation    PATIENT'S NAME: Abiola Matute  MRN:   [id number]  :   1965  ACCT. NUMBER: [id number]  DATE OF SERVICE: 23  START TIME: 12:00 PM  END TIME:  2:00 PM  FACILITATOR(S): Onelia Singh LADC  TOPIC: BEH Pyschoeducation  Number of patients attending the group:  3  Group Length:  2 Hours    Skills Group Therapy Type: Emotion regulation skills, Healthy behaviors development, and self-care    Summary of Group / Topics Discussed:    Emotions, behaviors and self-care      Group Attendance:  Attended group session    Patient's response to the group topic/interactions:  cooperative with task, discussed personal experience with topic, gave appropriate feedback to peers and listened actively    Patient appeared to be Actively participating.         Client specific details:  This is client's first group. Client checked in feeling anxious. Client reports this is her first treatment experience. Client briefly shared how she came to be in this group: she needs a liver transplant due to having cirrhosis, she has been sober for 5 months and at the beginning of the year she was diagnosed with liver cancer and underwent radiation, which helped.   Client welcomed new group member to group.  Client actively engaged in discussion about the reading on action VS inaction. Client reported understanding all actions may not be the correct action, however action is needed.   Client actively participated in the discussion on self-care, sharing difficulty in asking for help. Talked about we are not experts in all things we need and asking for help is a way to get better VS allowing someone to do everything for us. Current barriers to self-care are her Mom, who lives in [place], needs to be transferred to another facility for additional care, her spouse recently lost his job and she was taking care of her grandchildren 2 days a week. Client shares it's easy to put off doing for herself when all others  "have \"greater needs.\"    "

## 2023-05-31 NOTE — PROGRESS NOTES
"[facility] Weekly Treatment Plan Review      Attestation:   Jonathan Juares MD - Medical Director - Provides oversight and supervision of care.       Date Span:Monday: 23 through 23      Date     Group Therapy 0 2 hours 2 hours 0 hours 0 hours       Individual Therapy    1 hour             Family Therapy                 Psychoeducation                 Other (Specify)                 Client had excused absence on , due to a medical appt.  See note.  He did see his PCP      Program Running Totals: (No Phase 1 IOP due to this program being only OP level of care)  Total # of Phase 2 OP Group Sessions:2 for 4 hours  Total # of Phase 3 OP Recovery Management Group Sessions: None    Total # of 1:1 Sessions:  1 hour SI   1 hour         Patient did not have any absences during this time period (list absence dates and reason for absence).        Weekly Treatment Plan Review     Treatment Plan initiated on: .    Dimension1: Acute Intoxication/Withdrawal Potential -   Previous Dimension Ratin  Current Dimension Ratin  Date of Last Use 22  Any reports of withdrawal symptoms - No      Dimension 2: Biomedical Conditions & Complications -   Previous Dimension Ratin  Current Dimension Ratin  Medical Concerns: She reports having liver cancer and Crohn's disease.  \"right now I am doing pretty well  Outside medical appointments this week (list provider and reason for visit): none    Current Medications & Medication Changes:  Current Outpatient Medications   Medication     ammonium lactate (AMLACTIN) 12 % external cream     furosemide (LASIX) 40 MG tablet     inFLIXimab (REMICADE) 100 MG injection     methylPREDNISolone (MEDROL DOSEPAK) 4 MG tablet therapy pack     Milk Thistle-Dand-Fennel-Licor (MILK THISTLE XTRA) CAPS capsule     Multiple Vitamins-Minerals (MULTIVITAMIN & MINERAL PO)     ondansetron (ZOFRAN) 4 " MG tablet     oxyCODONE (ROXICODONE) 5 MG tablet     pantoprazole (PROTONIX) 40 MG EC tablet     spironolactone (ALDACTONE) 100 MG tablet     triamcinolone (KENALOG) 0.1 % external cream     No current facility-administered medications for this encounter.     Facility-Administered Medications Ordered in Other Encounters   Medication     BREEZA Lemon-Lime flavored oral liquid for Neutral Abdominal/Pelvic Imaging 1,000 mL     hyoscyamine (LEVSIN/SL) sublingual tablet 125 mcg     Medication Prescriber:  Linda Macdonald  Taking meds as prescribed? Yes  Medication side effects or concerns:  no        Dimension 3: Emotional/Behavioral Conditions & Complications -   Previous Dimension Ratin  Current Dimension Ratin  PHQ2:       2023    10:00 AM 3/28/2023    11:21 AM 3/26/2023     1:45 PM 2023     9:35 AM 1/3/2023     2:46 PM 2022    11:02 AM 10/4/2022     8:47 AM   PHQ-2 (  [employer])   Q1: Little interest or pleasure in doing things 0 0 0 0 0 0 0   Q2: Feeling down, depressed or hopeless 0 0 0 1 0 0 0   PHQ-2 Score 0 0 0 1 0 0 0   Q1: Little interest or pleasure in doing things  Not at all  Not at all Not at all Not at all    Q2: Feeling down, depressed or hopeless  Not at all  Several days Not at all Not at all    PHQ-2 Score  0  1 0 0       GAD2:       2023     9:54 AM 2023    10:00 AM   TAINA-2   Feeling nervous, anxious, or on edge 1 1   Not being able to stop or control worrying 1 0   TAINA-2 Total Score 2 1     PROMIS 10-Global Health (all questions and answers displayed):       2023     9:58 AM 2023     7:32 PM 2023    10:00 AM   PROMIS 10   In general, would you say your health is: Good Good    In general, would you say your quality of life is: Good Good    In general, how would you rate your physical health? Fair Fair    In general, how would you rate your mental health, including your mood and your ability to think? Good Good    In general, how would you rate your  satisfaction with your social activities and relationships? Good Good    In general, please rate how well you carry out your usual social activities and roles Good Good    To what extent are you able to carry out your everyday physical activities such as walking, climbing stairs, carrying groceries, or moving a chair? Completely Completely    In the past 7 days, how often have you been bothered by emotional problems such as feeling anxious, depressed, or irritable? Sometimes Rarely    In the past 7 days, how would you rate your fatigue on average? Mild Mild    In the past 7 days, how would you rate your pain on average, where 0 means no pain, and 10 means worst imaginable pain? 3 3    In general, would you say your health is: 3 3 3   In general, would you say your quality of life is: 3 3 4   In general, how would you rate your physical health? 2 2 3   In general, how would you rate your mental health, including your mood and your ability to think? 3 3 4   In general, how would you rate your satisfaction with your social activities and relationships? 3 3 5   In general, please rate how well you carry out your usual social activities and roles. (This includes activities at home, at work and in your community, and responsibilities as a parent, child, spouse, employee, friend, etc.) 3 3 5   To what extent are you able to carry out your everyday physical activities such as walking, climbing stairs, carrying groceries, or moving a chair? 5 5 4   In the past 7 days, how often have you been bothered by emotional problems such as feeling anxious, depressed, or irritable? 3 2 2   In the past 7 days, how would you rate your fatigue on average? 2 2 2   In the past 7 days, how would you rate your pain on average, where 0 means no pain, and 10 means worst imaginable pain? 3 3 4   Global Mental Health Score 12 13 17   Global Physical Health Score 15 15 14   PROMIS TOTAL - SUBSCORES 27 28 31     Mental health diagnosis none  Date  of last SIB:  never  Date of  last SI:  never  Date of last HI: never  Behavioral Targets:  see tx plan  Risk factors:  Client dealing with liver disease/cancer and Crohns, client needs a new liver,   Protective factors:  spirituality, forward/future oriented thinking, safe and stable environment, regular physical activity, living with other people and structured day  Current MH Assignments:  see tx plan    Narrative:    Week of 23 :Client reports feeling anxious due to first treatment experience, not knowing what to expect, her  Mother's declining health-needing a placement for higher level of care, spouse recently lost his job-need to obtain COBRA insurance and unable to care for grandchildren because of attending treatment. Client was saw Traci Aguilar and has not seen them in about 6 weeks. To follow up with primary counselor re: seeing psychotherapist or return to [facility].  Client rates the following on a scale of 1 (low) to 10 (high):  Anxiety- 5  Depression -1  Stress- 5 as a result of above concerns.   Client participated in psychotherapy/education on self-care, introduction to relaxation meditation-tensing and relaxing muscles, and sleep hygiene.  Current Mental Health symptoms include: . Active interventions to stabilize mental health symptoms this week :   At SI her PHQ-2 score was 0 and his TAINA-2 score was 1.  PROMISE 10 was  31:  She reports that at this time she is dealing with her mother's living situation and health issues. She reports that her father has passed away and that she is estranged from 1 sister.           Dimension 4: Treatment Acceptance / Resistance -   Previous Dimension Ratin  Current Dimension Ratin  REN[facility] Diagnosis:  Alcohol Use Disorder   303.90 (F10.20) Severe In a controlled environment  Commitment to tx process/Stage of change- contemplation  [facility] assignments - see tx plan      Narrative - Week of 23: Client reports motivation for sobriety is 10  "(high) Motivation for attending community support 5 (on scale 1 low-10 high).  The patient is motivated, to explore treatment.  She said \"of course I am partly here to fulfill requirements for liver transplant, but I want to learn healthy coping for lifelong sobriety.      Dimension 5: Relapse / Continued Problem Potential -   Previous Dimension Ratin  Current Dimension Ratin  Relapses this week - None  Urges to use - None  UA results - No results found for this or any previous visit (from the past 168 hour(s)).  Using ReSet Clem: N/A    Narrative- Week of 23: Client denies riggers or warning signs this past week.   Patient reports family members and her  are concerned about their substance use.  Patient reports their recovery goals are \"abstinence forever.\"  She has had no previous detoxes or treatments and needs to gain insight into healthy coping for relapse prevention.      Dimension 6: Recovery Environment -   Previous Dimension Ratin  Current Dimension Ratin  Family Involvement - not at this time  Summarize attendance at family groups and family sessions - NA  Family supportive of treatment?  Yes    Community support group attendance - no  Recreational activities - some camping, cooking and boating    Narrative -  Client is not currently attending community sup[ort and does not have a sponsor. Client's supportive people are her family. Client shared she has friends and has not talked with many about her current situation, because \"I am a private person,\" (treatment and needing transplant).  Patient reports they are involved in community of holden activities    They reports spirituality impacts recovery in the following ways:  \"There was some changes as far as .  We do belong to a [employer] but it's been a number of years since we've been.\"  Patient believes her spirituality and sobriety are connected. Patient reported having 3 children, all adults.  Patient has 2 " grandchildren.    She lives with her spouse and son. recreational/leisure activities: camping, cooking, boating, playing board games and card games.  Patient is currently disabled due to her Crohn's Disease.  Patient reports their income is obtained through SSDI disability; spouse.  Patient does not identify finances as a current stressor.         Progress made on transition planning goals: just began tx    Justification for Continued Treatment at this Level of Care:  No previous tx, needs 6 months of sobriety to be eligible for a transplant, needs to learn long term relapse prevention   Treatment coordination activities this week:  ARNEL's tay  Need for peer recovery support referral? No    Discharge Planning: Not at this time        Has vulnerable adult status change? No    Interdisciplinary Clinical Supervision including: no    Are Treatment Plan goals/objectives effective? Yes  *If no, list changes to treatment plan:    Are the current goals meeting client's needs? Yes  *If no, list the changes to treatment plan.  Plan:  Continue per Master Treatment Plan    *Client agrees with any changes to the treatment plan: Yes  *Client received copy of changes: Yes  *Client is aware of right to access a treatment plan review: Yes     Staff Members Contributing To Weekly Review:    Judi Dodd, MS, LADC, LPC  Lead Counselor Adult [facility] IOP/OP Programs Darinel Au, DD, LMFT, LADC, CGTP-[place] Licensed MICD Psychotherapist     5/31/23   SOLANGE Reese, CTTS

## 2023-06-01 NOTE — GROUP NOTE
"Group Therapy Documentation    PATIENT'S NAME: Abiola Matute  MRN:   [id number]  :   1965  ACCT. NUMBER: [id number]  DATE OF SERVICE: 23  START TIME: 12:00 PM  END TIME:  2:00 PM  FACILITATOR(S): Onelia Singh LADC  TOPIC: BEH Group Therapy  Number of patients attending the group:  5  Group Length:  2 Hours    Group Therapy Type: Psychotherapeutic    Summary of Group / Topics Discussed:    Meditation/Breathing Exercises, Self-Care Activities, and Sleep Hygiene      Group Attendance:  Attended group session    Patient's response to the group topic/interactions:  cooperative with task, discussed personal experience with topic and listened actively    Patient appeared to be Actively participating.        Client specific details: Client checked in feeling anxious and grateful for grandbabies. Client participated in the discussion about the reading on compassion and empathy, they shared how to apply to self surrounding self with people that make me feel good, mostly family, especially spouse. Self-care education and discussion continued, deep breathing and introduction of relaxation meditation of tensing and relaxing muscles. Client shared what they learned about themselves this past week, \"I'm very motivated to stay sober and I am not alone going through this.\"  Client plans to support their recovery this week by \"staying focused and continue to learn coping mechanisms.\"      "

## 2023-06-02 NOTE — ADDENDUM NOTE
Encounter addended by: Onelia Singh LADC on: 6/2/2023 1:56 PM   Actions taken: Pend clinical note, Clinical Note Signed

## 2023-06-02 NOTE — ADDENDUM NOTE
Encounter addended by: Onelia Singh LADC on: 6/1/2023 11:01 PM   Actions taken: Clinical Note Signed

## 2023-06-05 ENCOUNTER — TELEPHONE (OUTPATIENT)
Dept: BEHAVIORAL HEALTH | Facility: CLINIC | Age: 58
End: 2023-06-05
Payer: MEDICARE

## 2023-06-05 ENCOUNTER — TELEPHONE (OUTPATIENT)
Dept: GASTROENTEROLOGY | Facility: CLINIC | Age: 58
End: 2023-06-05
Payer: MEDICARE

## 2023-06-05 ENCOUNTER — HOSPITAL ENCOUNTER (OUTPATIENT)
Dept: BEHAVIORAL HEALTH | Facility: CLINIC | Age: 58
Discharge: HOME | End: 2023-06-05
Attending: FAMILY MEDICINE
Payer: COMMERCIAL

## 2023-06-05 PROCEDURE — H2035 A/D TX PROGRAM, PER HOUR: HCPCS | Mod: HQ

## 2023-06-05 NOTE — GROUP NOTE
"Group Therapy Documentation    PATIENT'S NAME: Abiola Matute  MRN:   [id number]  :   1965  ACCT. NUMBER: [id number]  DATE OF SERVICE: 23  START TIME: 12:00 PM  END TIME:  2:05 PM  FACILITATOR(S): Judi Dodd LADC  TOPIC: BEH Group Therapy  Number of patients attending the group: 5  Group Length:  2 Hours    Group Therapy Type: Addiction    Summary of Group / Topics Discussed:    Grief & Loss, Journaling, and process use episode, medical issues      Today in group we discussed that I am not a medical Dr. And this is not a medical group, but there are days clients will be processing their feelings and things associated with their medical issus.  I reminded clients of that importance and also that I will help direct what and how much time, but that focusing more on their feelings and dealings is more our focus in group, rather then that I can give or not give medical advice. We also did an  daily meditation reading on enjoying life and recovery, \"it doesn't all have to be hard\".  We discussed that HP can be like a parent that comforts a child when there is a storm outside. Group member admitted she had drank last week and did not tell group. We spent a great deal of time processing her use episode.  Group members were reminded of what feedback is and gave appropriate and helpful feedback to client who used and also to each other throughout group.  Due to time constraints, Group also checked in on 3 dimensions, not having time to check in on 4-6, which we will do Wed.  Counselor also added a brief discussion/explanation for clients to do \"Alternatives to Addictive Behavior\" for Wednesday, and I suggested this ties in with much of today's discussion.    Attestation: Chris Juares MD - Medical Director - Provides oversight and supervision of care.          Group Attendance:  Attended group session  Patient's response to the group topic/interactions:  cooperative with task, discussed personal " "experience with topic, and listened actively  Patient appeared to be Actively participating.        Client specific details: Today's session focussed on check in of dim 1-3.  Also dim 2 medical  and dim 5 warning signs and triggers/healthy coping.  Virginia said she is getting an itchy rash, likely related to liver disease, but sh will check with her care team.  She also encouraged other group members to keep advocating for themselves with medical issues. \"it is our bodies and important to pay attention\"   No changes in ASHISH reported and Dim 3: \"no thoughts of self harm or concerns\"  She participated in the daily reading and said she liked it.  Virginia also gave meaningful feedback to client who discussed use episode: \"it could be anyone of us\"  She said this is important and she is learning from others sharing.  Virginia also said she finds motivation for sobriety in her family and friends, \"but really deep down need to do this for myself\"    P). Finish wkly check in on Wed, do \"alternatives to Addictive behavior\"    Attestation:   Jonathan Juares MD - Medical Director - Provides oversight and supervision of care.     Judi Dodd, MS, LADC, LPC                                               "

## 2023-06-05 NOTE — TELEPHONE ENCOUNTER
[facility] Call Center    Phone Message    May a detailed message be left on voicemail: yes     Reason for Call: Other: Breakout on arms and legs that look like welts. Please call back     Action Taken: Message routed to:  [facility] ([facility]): Hep    Travel Screening: Not Applicable

## 2023-06-05 NOTE — TELEPHONE ENCOUNTER
----- Message from SOLANGE Grimaldo sent at 6/5/2023  7:08 AM CDT -----  Regarding: add tuesday sessions for 7 weeks  Scheduling Request  add tuesday sessions for 7  (starting 6/6)weeks, group meets Mondays, Tuesdays and Wednesdays, but no Tuesday sessions were added on schedule  Thank you       #reminder this is OP level of care from the start, meeting 6 hours per week#    Patient Name: STEVEN MCNAMARA [[id number]]  Location of programming: [facility]    Group: [id number] on Monday, Tuesday, and Wednesday, at 12:00 pm: PM to 2:00 pm    Attending Provider (MD): Dr. Juares,   Number of visits to be scheduled: 7 Tuesday SESSIONS   Duration of Appointment in minutes: 120 minutes  Visit Type: in person            Thank You,  Alana Dodd M.S., SOLANGE, Located within [facility]  Lead Counselor  [phone]

## 2023-06-06 ENCOUNTER — HOSPITAL ENCOUNTER (OUTPATIENT)
Dept: BEHAVIORAL HEALTH | Facility: CLINIC | Age: 58
Discharge: HOME | End: 2023-06-06
Attending: FAMILY MEDICINE
Payer: COMMERCIAL

## 2023-06-06 PROCEDURE — H2035 A/D TX PROGRAM, PER HOUR: HCPCS | Mod: HQ

## 2023-06-06 NOTE — TELEPHONE ENCOUNTER
June 6, 2023 12:32 PM - Fabio Antunez, RN:   This RN called patient, no answer, left voice message with request for call back and provided direct call back number.    June 7, 2023 8:32 AM - Fabio Antunez RN:   This RN had received callback from patient after end-of day, called patient this AM, no answer, left voice message. Message sent to Dr. Cervantes relaying patients message of rash/welts forming along arms/legs with request for assistance.

## 2023-06-06 NOTE — PROGRESS NOTES
"[facility] Weekly Treatment Plan Review      Attestation:   Jonathan Juares MD - Medical Director - Provides oversight and supervision of care.       Date Span:Monday: 23 through 23      Date     Group Therapy 2   2 hours 0 hours 0 hours       Individual Therapy    1 hour in lieu of group             Family Therapy                 Psychoeducation                 Other (Specify)                       Program Running Totals: (No Phase 1 IOP due to this program being only OP level of care)  Total # of Phase 2 OP Group Sessions:3 for 6 hours  Total # of Phase 3 OP Recovery Management Group Sessions: None    Total # of 1:1 Sessions:  1 hour BALWINDER   1 hour                                       Darinel                     Patient did not have any absences during this time period (list absence dates and reason for absence).        Weekly Treatment Plan Review     Treatment Plan initiated on: .    Dimension1: Acute Intoxication/Withdrawal Potential -   Previous Dimension Ratin  Current Dimension Ratin  Date of Last Use 22  Any reports of withdrawal symptoms - No      Dimension 2: Biomedical Conditions & Complications -   Previous Dimension Ratin  Current Dimension Ratin  Medical Concerns: She reports having liver cancer and Crohn's disease.  \"right now I am doing pretty well  Outside medical appointments this week (list provider and reason for visit): none    Current Medications & Medication Changes:  Current Outpatient Medications   Medication     ammonium lactate (AMLACTIN) 12 % external cream     furosemide (LASIX) 40 MG tablet     inFLIXimab (REMICADE) 100 MG injection     methylPREDNISolone (MEDROL DOSEPAK) 4 MG tablet therapy pack     Milk Thistle-Dand-Fennel-Licor (MILK THISTLE XTRA) CAPS capsule     Multiple Vitamins-Minerals (MULTIVITAMIN & MINERAL PO)     ondansetron (ZOFRAN) 4 MG tablet     " oxyCODONE (ROXICODONE) 5 MG tablet     pantoprazole (PROTONIX) 40 MG EC tablet     spironolactone (ALDACTONE) 100 MG tablet     triamcinolone (KENALOG) 0.1 % external cream     No current facility-administered medications for this encounter.     Facility-Administered Medications Ordered in Other Encounters   Medication     BREEZA Lemon-Lime flavored oral liquid for Neutral Abdominal/Pelvic Imaging 1,000 mL     hyoscyamine (LEVSIN/SL) sublingual tablet 125 mcg     Medication Prescriber:  Linda Macdonald  Taking meds as prescribed? Yes  Medication side effects or concerns:  no        Dimension 3: Emotional/Behavioral Conditions & Complications -   Previous Dimension Ratin  Current Dimension Ratin  PHQ2:       2023    10:00 AM 3/28/2023    11:21 AM 3/26/2023     1:45 PM 2023     9:35 AM 1/3/2023     2:46 PM 2022    11:02 AM 10/4/2022     8:47 AM   PHQ-2 (  [employer])   Q1: Little interest or pleasure in doing things 0 0 0 0 0 0 0   Q2: Feeling down, depressed or hopeless 0 0 0 1 0 0 0   PHQ-2 Score 0 0 0 1 0 0 0   Q1: Little interest or pleasure in doing things  Not at all  Not at all Not at all Not at all    Q2: Feeling down, depressed or hopeless  Not at all  Several days Not at all Not at all    PHQ-2 Score  0  1 0 0       GAD2:       2023     9:54 AM 2023    10:00 AM   TAINA-2   Feeling nervous, anxious, or on edge 1 1   Not being able to stop or control worrying 1 0   TAINA-2 Total Score 2 1     PROMIS 10-Global Health (all questions and answers displayed):       2023     9:58 AM 2023     7:32 PM 2023    10:00 AM   PROMIS 10   In general, would you say your health is: Good Good    In general, would you say your quality of life is: Good Good    In general, how would you rate your physical health? Fair Fair    In general, how would you rate your mental health, including your mood and your ability to think? Good Good    In general, how would you rate your satisfaction  with your social activities and relationships? Good Good    In general, please rate how well you carry out your usual social activities and roles Good Good    To what extent are you able to carry out your everyday physical activities such as walking, climbing stairs, carrying groceries, or moving a chair? Completely Completely    In the past 7 days, how often have you been bothered by emotional problems such as feeling anxious, depressed, or irritable? Sometimes Rarely    In the past 7 days, how would you rate your fatigue on average? Mild Mild    In the past 7 days, how would you rate your pain on average, where 0 means no pain, and 10 means worst imaginable pain? 3 3    In general, would you say your health is: 3 3 3   In general, would you say your quality of life is: 3 3 4   In general, how would you rate your physical health? 2 2 3   In general, how would you rate your mental health, including your mood and your ability to think? 3 3 4   In general, how would you rate your satisfaction with your social activities and relationships? 3 3 5   In general, please rate how well you carry out your usual social activities and roles. (This includes activities at home, at work and in your community, and responsibilities as a parent, child, spouse, employee, friend, etc.) 3 3 5   To what extent are you able to carry out your everyday physical activities such as walking, climbing stairs, carrying groceries, or moving a chair? 5 5 4   In the past 7 days, how often have you been bothered by emotional problems such as feeling anxious, depressed, or irritable? 3 2 2   In the past 7 days, how would you rate your fatigue on average? 2 2 2   In the past 7 days, how would you rate your pain on average, where 0 means no pain, and 10 means worst imaginable pain? 3 3 4   Global Mental Health Score 12 13 17   Global Physical Health Score 15 15 14   PROMIS TOTAL - SUBSCORES 27 28 31     Mental health diagnosis none  Date of last SIB:   "never  Date of  last SI:  never  Date of last HI: never  Behavioral Targets:  see tx plan  Risk factors:  Client dealing with liver disease/cancer and Crohns, client needs a new liver,   Protective factors:  spirituality, forward/future oriented thinking, safe and stable environment, regular physical activity, living with other people and structured day  Current MH Assignments:  see tx plan    Narrative:  Wk of  \"no thoughts of self harm\"  Attended MH skills group with Darinel Angely  Week of 23 :Client reports feeling anxious due to first treatment experience, not knowing what to expect, her  Mother's declining health-needing a placement for higher level of care, spouse recently lost his job-need to obtain COBRA insurance and unable to care for grandchildren because of attending treatment. Client was saw Traci Aguilar and has not seen them in about 6 weeks. To follow up with primary counselor re: seeing psychotherapist or return to [facility].  Client rates the following on a scale of 1 (low) to 10 (high):  Anxiety- 5  Depression -1  Stress- 5 as a result of above concerns.   Client participated in psychotherapy/education on self-care, introduction to relaxation meditation-tensing and relaxing muscles, and sleep hygiene.  Current Mental Health symptoms include: . Active interventions to stabilize mental health symptoms this week :   At SI her PHQ-2 score was 0 and his TAINA-2 score was 1.  PROMISE 10 was  31:  She reports that at this time she is dealing with her mother's living situation and health issues. She reports that her father has passed away and that she is estranged from 1 sister.           Dimension 4: Treatment Acceptance / Resistance -   Previous Dimension Ratin  Current Dimension Ratin  [facility] Diagnosis:  Alcohol Use Disorder   303.90 (F10.20) Severe In a controlled environment  Commitment to tx process/Stage of change- contemplation  [facility] assignments - see tx " "plan      Narrative -  Wk of   [place]ia also said she finds motivation for sobriety in her family and friends, \"but really deep down need to do this for myself\"  Told counselor and group how she didn't think she would get much out of tx and already is getting so much and is grateful for older adult group   Week of 23: Client reports motivation for sobriety is 10 (high) Motivation for attending community support 5 (on scale 1 low-10 high).  The patient is motivated, to explore treatment.  She said \"of course I am partly here to fulfill requirements for liver transplant, but I want to learn healthy coping for lifelong sobriety.      Dimension 5: Relapse / Continued Problem Potential -   Previous Dimension Ratin  Current Dimension Ratin  Relapses this week - None  Urges to use - None  UA results - No results found for this or any previous visit (from the past 168 hour(s)).  Using ReSet Clem: N/A    Narrative-  Wk of  He participated in the daily reading and said she liked it.  Virginia also gave meaningful feedback to client who discussed use episode: \"it could be anyone of us\"  She said this is important and she is learning from others sharing.  Week of 23: Client denies riggers or warning signs this past week. She said she liked the saying \"it is just as easy to create good habits as bad ones\"   Patient reports family members and her  are concerned about their substance use.  Patient reports their recovery goals are \"abstinence forever.\"  She has had no previous detoxes or treatments and needs to gain insight into healthy coping for relapse prevention.      Dimension 6: Recovery Environment -   Previous Dimension Ratin  Current Dimension Ratin  Family Involvement - not at this time  Summarize attendance at family groups and family sessions - NA  Family supportive of treatment?  Yes    Community support group attendance - no  Recreational activities - some camping, cooking and " "boating    Narrative - wk of 6/6  goals for the wknd: look into volunteering at [facility] for writing card.  \"no community support attendance at this time\"   Client is not currently attending community sup[ort and does not have a sponsor. Client's supportive people are her family. Client shared she has friends and has not talked with many about her current situation, because \"I am a private person,\" (treatment and needing transplant).  Patient reports they are involved in community of holden activities    They reports spirituality impacts recovery in the following ways:  \"There was some changes as far as .  We do belong to a [employer] but it's been a number of years since we've been.\"  Patient believes her spirituality and sobriety are connected. Patient reported having 3 children, all adults.  Patient has 2 grandchildren.    She lives with her spouse and son. recreational/leisure activities: camping, cooking, boating, playing board games and card games.  Patient is currently disabled due to her Crohn's Disease.  Patient reports their income is obtained through SSDI disability; spouse.  Patient does not identify finances as a current stressor.         Progress made on transition planning goals: just began tx    Justification for Continued Treatment at this Level of Care:  No previous tx, needs 6 months of sobriety to be eligible for a transplant, needs to learn long term relapse prevention   Treatment coordination activities this week:  ARNEL's tay  Need for peer recovery support referral? No    Discharge Planning: Not at this time        Has vulnerable adult status change? No    Interdisciplinary Clinical Supervision including: no    Are Treatment Plan goals/objectives effective? Yes  *If no, list changes to treatment plan:    Are the current goals meeting client's needs? Yes  *If no, list the changes to treatment plan.  Plan:  Continue per Master Treatment Plan    *Client agrees with any changes to the treatment plan: " Yes  *Client received copy of changes: Yes  *Client is aware of right to access a treatment plan review: Yes     Staff Members Contributing To Weekly Review:    Judi Dodd, MS, LADC, LPC  Lead Counselor Adult [facility] IOP/OP Programs Darinel Au, DOUG, LMFT, LADC, CGTP-[place] [facility] Psych[facility]

## 2023-06-06 NOTE — GROUP NOTE
"Attestation: Chris Juares MD - Medical Director - Provides oversight and supervision of care.    Group Therapy Documentation    PATIENT'S NAME: Abiola Matute  MRN:   [id number]  :   1965  ACCT. NUMBER: [id number]  DATE OF SERVICE: 23  START TIME: 12:00 PM  END TIME:  2:00 PM  FACILITATOR(S): Darinel Au LMFT, KSENIA  TOPIC: BEH Group Therapy  Number of patients attending the group:  3  Group Length:  2 Hours    Group Therapy Type: Addiction, Psychoeducation, Psychotherapeutic, and Skills/Education    Summary of Group / Topics Discussed:    Cognitive Therapy Techniques, Co-occurring Illness, Coping Skills/Lifestyle Managemet, Trauma Informed Care, and Understanding and Managing Anxiety.    Group Attendance:  Abiola attended group session.    Client's response to the group topic/interactions:  Abiola was cooperative with task, discussed personal experience with topic, expressed readiness to alter behaviors, expressed understanding of topic, and listened actively throughout today's group session.     Abiola appeared to be actively participating, appeared attentive and appeared engaged. Abiola was insightful and was a good contributor in group today.      Client specific details:  On a 0-10 Likert Scale (0=No issues/symptoms & 10=worst issues/symptoms), Abiola checked-in with rating her depression as a \"2\", anxiety as a \"5\", and rated her cravings as a \"0\". On a different 0-10 Likert Scale (0=low motivation and attendance & 10=highest level of motivation and attendance), Abiola rated her motivation for sobriety as a \"10\", and lastly Abiola rated her attendance over this past week as a \"10\".  Overall Abiola reported feeling \"happy\" today. Abiola reported being grateful for \"this group and the intimacy of this group\". Abiola was asked what strength she has that she can use to strengthen her recovery, in which Abiola stated, \"my determination\". Today's group session focused on " "Dimension(s) 3 & 5. Abiola showed progress by being able to teach-back the skills she learned during today's group session. Please see additional details below for further specific details on group topic matter.  Abiola learned and developed greater insight into the  Etiology of Anxiety and Strategies to Manage Anxiety .   WHAT ARE ANXIETY DISORDERS?  > Abiola learned how many people in a co-occurring disorder treatment program have experienced significant anxiety and suffer from an anxiety disorder. About 40 million [place] adults will experience an anxiety disorder in a year (Emotional Life, 2016).  SIGNS AND SYMPTOMS OF ANXIETY DISORDERS  > Abiola learned about the signs and symptoms of anxiety disorders and how they may be familiar because most are common reactions to stress. But if symptoms are extreme in their intensity, last for months, are chronic, or more severe than would seem usual for the situation, it can indicate an anxiety disorder may be present.   As a teach-back, Abiola was asked what emotional and physical symptoms of anxiety have been the most troublesome or disruptive in her life. Abiola stated, \"sleep difficulties especially around being unable to fall or stay asleep,  irritability and crankiness, and inability to control anxious thoughts.\"  SOME COMMON TYPES OF ANXIETY DISORDERS   SEPARATION ANXIETY   > Abiola learned how this disorder refers to excessive fear or anxiety about separation from parent figures or one's home. Children with separation anxiety may cling to their parents or caregivers and have trouble when they are . Adults with this disorder may be uncomfortable when traveling independently or being away from their children, spouses, or attachment figures.   SPECIFIC PHOBIA   > Abiola learned how a phobia is an unreasonable fear associated with a specific object or experience. Clients with phobias have unwarranted fear and facing a feared object will immediately " "make him or her feel tense, nervous, and panicky. This is known as anticipatory anxiety, meaning as the person looks ahead, they become increasingly more anxious. A phobia is considered severe when it interferes with life. Common specific phobias include flying, heights, snakes, closed spaces, crowds, insects, and dogs.   SOCIAL ANXIETY DISORDER   > Abiola learned that social anxiety is best defined as an excessive fear of social situations and affects 2-13% of the [place] population. About one in five clients with a social anxiety disorder also suffers from an alcohol or substance use disorder. Social anxiety disorder is anxiety about and in social situations that goes beyond shyness. It often includes a fear that one is being watched and judged by others (Emotional Life, 2016). They are often afraid of several situations, rather than one specific thing, and may have trouble speaking in public, interacting with others, or giving a report in a group. Social anxiety disorder generally responds very well to treatment, including cognitive behavior therapy and antidepressant medication, or a combination of both.  As a teach-back, bAiola was asked what social situations have produced anxiety or apprehension for her. Abiola stated, \"being the center of attention and public speaking.\"  AGORAPHOBIA   > Abiola learned how agoraphobia is an abnormal fear of being trapped, helpless, or in a situation with no easy means of escape. This disorder may be accompanied by anxiety or panic symptoms, or the anticipation of those feelings. Trigger situations may include being in public spaces, shopping malls, public transportation, or simply being outside one's home.   SUBSTANCE/MEDICATION-INDUCED ANXIETY DISORDER   > Abiola was taught when anxiety symptoms are produced by the use of, or withdrawal from, alcohol or other drugs, substance-induced anxiety disorder may be diagnosed. Symptoms may include nervousness, agitation, " restlessness, panic attacks, and apprehension. Use of prescribed medications, alcohol, and illegal drugs such as methamphetamine, cocaine, and others can contribute to the disorder's severity.   GENERALIZED ANXIETY DISORDER   > Abiola learned about how everyone worries and feels anxious sometimes. When worry is manageable, we can connect it to realistic problems and take steps to solve those problems and worry subsides. But when worry goes beyond the everyday concerns of life and you are anxious all the time, for no apparent reason, you may be dealing with Generalized Anxiety Disorder. There may be no panic attacks or high states of anxiety, but symptoms may be relatively low-key and chronic, happening more than half the days for at least six months. Worrying constantly, anticipating disaster, having difficulty concentrating, staying focused, or making decisions, being uneasy, and experiencing trouble relaxing or sitting still are signs of TAINA and can make it hard to function normally at work or within relationships (Emotional Life, 2016).   PANIC DISORDER   > Abiola learned about how a panic disorder is a common anxiety disorder in which the client experiences more than one panic episode and often worries about having more of them. Panic attacks are extremely painful experiences and come with physical symptoms including heart palpitations, trembling or shaking, shortness of breath, excessive sweating, gastrointestinal problems, and sensations of choking. The discomfort of a panic attack often triggers avoidance of situations, activities, or environments where they commonly occur.   PANIC ATTACKS   > Abiola learned that in addition to these general signs and symptoms, many people with anxiety disorders experience panic attacks. These are sudden and brief periods of intense fear and discomfort, typically producing physical symptoms.  TREATING ANXIETY DISORDERS  > Abiola was taught that there are many effective  treatments to help with anxiety disorders and their complexity. The right treatment plan depends on many factors, including the type of anxiety disorder, how you respond to medication and other therapies, your current stress level, and your personal history. By working with a healthcare professional and therapist, you can find the right treatment plan for you.  THE RELAXATION RESPONSE   > Abiola was taught that stress is a natural response to change and challenges. The stress response floods your body with adrenaline and cortisol in preparation for  fight or flight,  breathing and heart rate increase, and you get a surge of energy. One stress management technique is to develop a relaxation response, which brings your system back into balance: deepening your breathing, reducing stress hormones, slowing your heart rate and blood pressure, and relaxing your muscles. Developing a relaxation response is a powerful tool, but requires practice for it to be a useful and natural response. There are many ways to build and strengthen this response, including deep breathing, progressive muscle relaxation, and meditation.   COGNITIVE BEHAVIORAL THERAPY   > Abioal was taught that anxiety disorders are treatable, often with Cognitive Behavioral Therapy (CBT), anti-depressant medication, or both. With CBT, a therapist helps examine and change the way you think and feel. They help bring awareness to thought patterns and challenge or change them to reduce anxiety. Behavioral therapy helps you to resist the urge to avoid anxiety-producing situations and to learn to replace anxious responses with more positive behaviors. Therapy can teach and model the use of relaxation tools and techniques. Additional stress management strategies, such as exercise, progressive muscle relaxation techniques, and guided meditation, along with avoiding caffeine, drugs, and alcohol, may also be helpful.   EXPOSURE THERAPY   > Abiola was taught that  "Exposure Therapy is a CBT-proven treatment method for anxiety disorders. A therapist will help a client safely and indirectly approach their fears by retelling or writing their concerns and worries. This is called  in vivo  exposure and allows the therapist to work in a slow and controlled manner. For instance, to help someone with a fear of flying, they will gradually work through the fear and may eventually go on a flight together. Exposure therapy helps loosen the emotional punch fear holds over someone and allows them to better handle their anxiety responses. As fear and anxiety lessen and lose their power, symptoms, and disruptions to life fade.  MEDICATION   > Abiola learned how there are several medication options to help those experiencing an anxiety disorder, usually falling into one of three categories: anti-depressants, anti-anxiety drugs, and beta-blockers. Anti-anxiety medications, while effective, tend to be a short-term solution for handling anxiety, are addictive, and can be dangerous. Safer and more common anxiety medications are anti-depressants, including selective serotonin reuptake inhibitors (SSRIs) and serotonin-norepinephrine reuptake inhibitors (SNRIs). The response to medication depends on each individual; what works well for one person may not work for another. It is important to work closely with a healthcare provider and find the right medication and dosage to best help you.  MANAGING STRESS AND ANXIETY   > Abiola learned how managing your stress level is critical to successful recovery. In addition to working with your healthcare provider, try the following strategies to help promote resilience and manage stress and anxiety (Emotional Life, 2016).  As a teach-backs, Abiola was asked to share one or two strategies she can use to reduce or better manage anxiety. Abiola stated, \"to get enough sleep to give my body the best chance to recuperate and lighten my load of responsibilities by " "saying no more often.\" Abiola was asked to keep in mind she will have to practice these skills in order to master them.   NEW-FOUND CONFIDENCE   > Abiola learned how anxiety keeps one focused on fear and failures, rather than success and progress. But anxiety can be managed. Resist the urge to avoid stressful and anxiety-provoking situations. Instead, break each task or event into small pieces, then calm yourself using the skills mentioned. In this way, you are stronger with each time you become mildly anxious, rather than getting weaker and more fearful. Practice these techniques in easy scenarios. Build your strength and tolerance and continue to resist the urge to avoid things that make you anxious. As you lower your general level of stress and anxiety, and build anxiety management skills, you will move forward with increased self-assurance and confidence.  > On a 0-10 Likert Scale (0=lowest confidence & 10=most confidence), Abiola was asked to rate her confidence with using the skills learned on \" Understanding and Strategies to Manage Anxiety\" to help her strengthen her recovery both with MH and RENEE. Abiola stated her rating was a \"10\".   Plan: Abiola will practice on a daily basis the strategy she shared that will help her to reduce or better manage her stress and/or anxiety.  "

## 2023-06-07 ENCOUNTER — HOSPITAL ENCOUNTER (OUTPATIENT)
Dept: BEHAVIORAL HEALTH | Facility: CLINIC | Age: 58
Discharge: HOME | End: 2023-06-07
Attending: FAMILY MEDICINE
Payer: COMMERCIAL

## 2023-06-07 PROCEDURE — H2035 A/D TX PROGRAM, PER HOUR: HCPCS | Mod: HQ

## 2023-06-07 NOTE — GROUP NOTE
"Group Therapy Documentation    PATIENT'S NAME: Abiola Matute  MRN:   [id number]  :   1965  ACCT. NUMBER: [id number]  DATE OF SERVICE: 23  START TIME: 12:00 PM  END TIME:  2:00 PM  [facility]  TOPIC: BEH Group Therapy  Number of patients attending the group: 3  Group Length:  2 Hours    Group Therapy Type: Addiction and Psychoeducation    Summary of Group / Topics Discussed:    Balanced Lifestyle , Communication, Journaling, Leisure Exploration/Use of Leisure Time, Proper Nutrition & Exercise, and Stress Management      Attestation:   Jonathan Juares MD - Medical Director - Provides oversight and supervision of care.     Today Group discussed opening reading.  \"If nothing changes, nothing changes\" from [employer], Days of Amelie. The reading emphasized that things that cause distress that we repeat or cause consequences, can be referred to as a negative pattern. People read, go to meetings, talk with others to change or hopefully modify parts of life that cause problems or distress or negative patterns.  I reminded clients that new behaviors, thoughts and sometimes surroundings are important to changing the negative patterns.  We discussed how this related to tx.  Group completed check in from Monday on last 3 . We discussed Goal setting    Summary of Group / Topics Discussed:     Topic: Goal setting for self and treatement psychoeducation  This topic will discuss importance of goal setting for continued treatment and recovery    Objective(s):   -Client will gain insight into what they want for self/treatment the next week   -Client will identify areas wants to work on this weekend    Structure:   -Facilitate group discussion around each patient s answers to questions  -Facilitate group discussion on what goals may be helpful      Expected therapeutic outcomes:    -Understanding how short term goals can be helpful for treatment  -gain insight into what is going " "well and what may be helpful to decrease riskt to substances/behavior      Therapeutic outcome(s) measured by:   -Patient s ability to identify 2 goals or activities they have planned for the weekend     Group Attendance:  Attended group session    Patient's response to the group topic/interactions:  cooperative with task and discussed personal experience with topic    Patient appeared to be Attentive and Engaged.        Clients specifice information : Told counselor and group how she didn't think she would get much out of tx and already is getting so much and is grateful for older adult group  [place]ia said she has a medical procedure next Tuessday so will not be able to attend group.  She said she liked the saying \"it is just as easy to create good habits as bad ones\"  Today's session focused on dimension 5 alternatives to addictive behavior and completion of dim 4,5,6 of weekly check in.   Comments on reading:   Dim 4, 9 of 10 on motivation for sobriety.  Dim 5 no triggers this week  Dim 6: This group is my support right now  Client was able to name 2 things grateful for: progress with her Mom's long term care/housing, my   2 goals for the wknd: look into volunteering at [facility] for writing card    P) report back on goals and your progress    Attestation:   Jonathan Juares MD - Medical Director - Provides oversight and supervision of care.         Judi Dodd, MS, LADC, LPC          "

## 2023-06-09 ENCOUNTER — PATIENT OUTREACH (OUTPATIENT)
Dept: GASTROENTEROLOGY | Facility: CLINIC | Age: 58
End: 2023-06-09

## 2023-06-09 ENCOUNTER — MEDICAL CORRESPONDENCE (OUTPATIENT)
Dept: HEALTH INFORMATION MANAGEMENT | Facility: CLINIC | Age: 58
End: 2023-06-09

## 2023-06-09 NOTE — TELEPHONE ENCOUNTER
Virginia called, she states she received 4 bags of her medication for Remicade and she usually receives 8. She states that back on February 27, 2023, she had already received her infusion when she went to the lab to get her IFX level drawn which is the reason why the IFX was high.     Per telephone encounter on 2/28/23, pt sent a message that she is going to get her infusion on 3/6/23. Which would be after her lab IFX level was drawn. Tried to explain. Pt did not like the explanation for why her Remicade was de-escalated or why she was not notified of the change.    Dr. Schilling,  FI.  Pt states she was unaware of the change in dose.  Pt states she will discuss this with you at her appointment 6/13/23.    Thank you.  Remi

## 2023-06-10 ENCOUNTER — TRANSFERRED RECORDS (OUTPATIENT)
Dept: MULTI SPECIALTY CLINIC | Facility: CLINIC | Age: 58
End: 2023-06-10

## 2023-06-12 ENCOUNTER — HOSPITAL ENCOUNTER (OUTPATIENT)
Dept: BEHAVIORAL HEALTH | Facility: CLINIC | Age: 58
Discharge: HOME | End: 2023-06-12
Attending: FAMILY MEDICINE
Payer: COMMERCIAL

## 2023-06-12 PROCEDURE — H2035 A/D TX PROGRAM, PER HOUR: HCPCS | Mod: HQ

## 2023-06-12 NOTE — GROUP NOTE
"Group Therapy Documentation    PATIENT'S NAME: Abiola Matute  MRN:   [id number]  :   1965  ACCT. NUMBER: [id number]  DATE OF SERVICE: 23  START TIME: 12:00 PM  END TIME:  2:00 PM  FACILITATOR(S): Judi Dodd LADC  TOPIC: BEH Group Therapy  Number of patients attending the group: 3  Group Length:  2 Hours    Group Therapy Type: Addiction    Summary of Group / Topics Discussed:    Balanced Lifestyle , Boundaries, Communication, Forgiveness, Journaling, and Mindfulness      Today in group clients read opening meditation reading from [employer], by Rosy. This reading was focused on the analogy of recovery being like an adventure trip where in the wilderness, where only some is explored and mapped.  I talked with them about each day in recovery being a day that we don't know exactly what it will bring, but each day will have a mixture of feelings and they can use their \"parts that are mapped\" and combine with wisdom and planning to prepare and deal with what is not known.  Group members each brought up examples and their own analogies.  We discussed what is chartered territory and what is unchartered, as far as life in recovery.  Clients checked in on all dimensions and we discussed issues that came up.  Group ended with discussion of gratitude      Attestation: Chris Juares MD - Medical Director - Provides oversight and supervision of care.           Group Attendance:  Attended group session    Patient's response to the group topic/interactions:  cooperative with task, discussed personal experience with topic and expressed readiness to alter behaviors    Patient appeared to be Actively participating and Attentive.          Client specific details: [place] discussed that she has been late a couple times, but realized with 2 other members gone, how this speaks to the importance of being on time and valuing and respecting others time, too.   She will also miss tomorrows group due to a long " "awaited medical appt.  Today's session focussed on check in of all dimensions.  It also focused on dim 5 and the psychoeducation of  alternatives to addictive behavior:      She said of the daily reading: We learn what path works, by staying on the one that works, but realizing going off into the poison ivy is not helpful.  I told her that is very fitting for addiction, as it wants you to come into the weeds, but we do know what happens after 1 or 8 or 50 trips into the poison ivy.      Dim1: ASHISH: no change reported  Dim2: medical issues or appts: \"just an appt tomorrow\"  Dim 3: \"no thoughts of self harm or concerns\"  on likert scale 1-low and 10 -high:   Anxiety:  1  stress: 1 depression 1  issues to discuss and Helpful coping:  Breathing, getting to others in gropu  Dim 4: on likert scale 1-low and 10 -high:  Motivation for sobriety:  10  motivation for sober support attendance: not doing yet  Dim 5: warning signs, triggers and cravings: none   helpful coping: attending group  Dim 6: Sober support:  [facility], [facility], [place] [facility]    P)continue awareness of feelings and check in daily, even when not doing group.    Attestation:   Jonathan Juares MD - Medical Director - Provides oversight and supervision of care.     Judi Dodd, MS, LADC, LPC                                                   "

## 2023-06-12 NOTE — PROGRESS NOTES
"[facility] Weekly Treatment Plan Review      Attestation:   Jonathan Juares MD - Medical Director - Provides oversight and supervision of care.       Date Span:Monday: 23 through 23      Date     Group Therapy 2  ex 2 hours 0 hours 0 hours       Individual Therapy                 Family Therapy                 Psychoeducation                 Other (Specify)                    Client has one excused absence this week , due to medical appts.     Program Running Totals: (No Phase 1 IOP due to this program being only OP level of care)  Total # of Phase 2 OP Group Sessions:5 for 10 hours  Total # of Phase 3 OP Recovery Management Group Sessions: None    Total # of 1:1 Sessions:  1 hour BALWINDER   1 hour                                       Darinel                     Patient did not have any absences during this time period (list absence dates and reason for absence).        Weekly Treatment Plan Review     Treatment Plan initiated on: .    Dimension1: Acute Intoxication/Withdrawal Potential -   Previous Dimension Ratin  Current Dimension Ratin  Date of Last Use 22  Any reports of withdrawal symptoms - No      Dimension 2: Biomedical Conditions & Complications -   Previous Dimension Ratin  Current Dimension Ratin  Medical Concerns: She saw Jazmin Barrios at Urgent care for skin issues.  She also said she met with her GI practioner  She reports having liver cancer and Crohn's disease.  \"right now I am doing pretty well  Outside medical appointments this week (list provider and reason for visit):  she saw Jazmin Meng PA-C for a flare up of her psoriasis likely related to her other medical issues, per client     Current Medications & Medication Changes:  Current Outpatient Medications   Medication     ammonium lactate (AMLACTIN) 12 % external cream     furosemide (LASIX) 40 MG tablet     " inFLIXimab (REMICADE) 100 MG injection     methylPREDNISolone (MEDROL DOSEPAK) 4 MG tablet therapy pack     Milk Thistle-Dand-Fennel-Licor (MILK THISTLE XTRA) CAPS capsule     Multiple Vitamins-Minerals (MULTIVITAMIN & MINERAL PO)     ondansetron (ZOFRAN) 4 MG tablet     oxyCODONE (ROXICODONE) 5 MG tablet     pantoprazole (PROTONIX) 40 MG EC tablet     spironolactone (ALDACTONE) 100 MG tablet     triamcinolone (KENALOG) 0.1 % external cream     No current facility-administered medications for this encounter.     Facility-Administered Medications Ordered in Other Encounters   Medication     BREEZA Lemon-Lime flavored oral liquid for Neutral Abdominal/Pelvic Imaging 1,000 mL     hyoscyamine (LEVSIN/SL) sublingual tablet 125 mcg     Medication Prescriber:  Linda Macdonald  Taking meds as prescribed? Yes  Medication side effects or concerns:  no        Dimension 3: Emotional/Behavioral Conditions & Complications -   Previous Dimension Ratin  Current Dimension Ratin  PHQ2:       2023    10:00 AM 3/28/2023    11:21 AM 3/26/2023     1:45 PM 2023     9:35 AM 1/3/2023     2:46 PM 2022    11:02 AM 10/4/2022     8:47 AM   PHQ-2 (  [employer])   Q1: Little interest or pleasure in doing things 0 0 0 0 0 0 0   Q2: Feeling down, depressed or hopeless 0 0 0 1 0 0 0   PHQ-2 Score 0 0 0 1 0 0 0   Q1: Little interest or pleasure in doing things  Not at all  Not at all Not at all Not at all    Q2: Feeling down, depressed or hopeless  Not at all  Several days Not at all Not at all    PHQ-2 Score  0  1 0 0       GAD2:       2023     9:54 AM 2023    10:00 AM   TAINA-2   Feeling nervous, anxious, or on edge 1 1   Not being able to stop or control worrying 1 0   TAINA-2 Total Score 2 1     PROMIS 10-Global Health (all questions and answers displayed):       2023     9:58 AM 2023     7:32 PM 2023    10:00 AM   PROMIS 10   In general, would you say your health is: Good Good    In general, would you  say your quality of life is: Good Good    In general, how would you rate your physical health? Fair Fair    In general, how would you rate your mental health, including your mood and your ability to think? Good Good    In general, how would you rate your satisfaction with your social activities and relationships? Good Good    In general, please rate how well you carry out your usual social activities and roles Good Good    To what extent are you able to carry out your everyday physical activities such as walking, climbing stairs, carrying groceries, or moving a chair? Completely Completely    In the past 7 days, how often have you been bothered by emotional problems such as feeling anxious, depressed, or irritable? Sometimes Rarely    In the past 7 days, how would you rate your fatigue on average? Mild Mild    In the past 7 days, how would you rate your pain on average, where 0 means no pain, and 10 means worst imaginable pain? 3 3    In general, would you say your health is: 3 3 3   In general, would you say your quality of life is: 3 3 4   In general, how would you rate your physical health? 2 2 3   In general, how would you rate your mental health, including your mood and your ability to think? 3 3 4   In general, how would you rate your satisfaction with your social activities and relationships? 3 3 5   In general, please rate how well you carry out your usual social activities and roles. (This includes activities at home, at work and in your community, and responsibilities as a parent, child, spouse, employee, friend, etc.) 3 3 5   To what extent are you able to carry out your everyday physical activities such as walking, climbing stairs, carrying groceries, or moving a chair? 5 5 4   In the past 7 days, how often have you been bothered by emotional problems such as feeling anxious, depressed, or irritable? 3 2 2   In the past 7 days, how would you rate your fatigue on average? 2 2 2   In the past 7 days, how  "would you rate your pain on average, where 0 means no pain, and 10 means worst imaginable pain? 3 3 4   Global Mental Health Score 12 13 17   Global Physical Health Score 15 15 14   PROMIS TOTAL - SUBSCORES 27 28 31     Mental health diagnosis none  Date of last SIB:  never  Date of  last SI:  never  Date of last HI: never  Behavioral Targets:  see tx plan  Risk factors:  Client dealing with liver disease/cancer and Crohns, client needs a new liver,   Protective factors:  spirituality, forward/future oriented thinking, safe and stable environment, regular physical activity, living with other people and structured day  Current MH Assignments:  see tx plan    Narrative:  Wk of 6/12  I am feeling much better and less anxiety about group attendance as I continue to attend and get to know others. \"no thoughts of self harm or concerns\". On likert scale 1-low and 10 -high:   Anxiety:  1  stress: 1 depression 1  issues to discuss and Helpful coping:  Breathing, getting to others in group  Wk of 6/6 \"no thoughts of self harm\"  Attended MH skills group with Darinel Au  Week of 5/31/23 :Client reports feeling anxious due to first treatment experience, not knowing what to expect, her  Mother's declining health-needing a placement for higher level of care, spouse recently lost his job-need to obtain COBRA insurance and unable to care for grandchildren because of attending treatment. Client was saw Traci Aguilar and has not seen them in about 6 weeks. To follow up with primary counselor re: seeing psychotherapist or return to [facility].  Client rates the following on a scale of 1 (low) to 10 (high):  Anxiety- 5  Depression -1  Stress- 5 as a result of above concerns.   Client participated in psychotherapy/education on self-care, introduction to relaxation meditation-tensing and relaxing muscles, and sleep hygiene.  Current Mental Health symptoms include: . Active interventions to stabilize mental health symptoms " "this week :   At SI her PHQ-2 score was 0 and his TAINA-2 score was 1.  PROMISE 10 was  31:  She reports that at this time she is dealing with her mother's living situation and health issues. She reports that her father has passed away and that she is estranged from 1 sister.           Dimension 4: Treatment Acceptance / Resistance -   Previous Dimension Ratin  Current Dimension Ratin  [facility] Diagnosis:  Alcohol Use Disorder   303.90 (F10.20) Severe In a controlled environment  Commitment to tx process/Stage of change- contemplation  [facility] assignments - see tx plan      Narrative -  Week of  Virginia discussed that she has been late a couple times, but realized with 2 other members gone, how this speaks to the importance of being on time and valuing and respecting others time, too.   She will also miss tomorrows group due to a long awaited medical appt. Dim 4: on likert scale 1-low and 10 -high:  Motivation for sobriety:  10  motivation for sober support attendance: not doing yet  Wk of   Virginia also said she finds motivation for sobriety in her family and friends, \"but really deep down need to do this for myself\"  Told counselor and group how she didn't think she would get much out of tx and already is getting so much and is grateful for older adult group   Week of 23: Client reports motivation for sobriety is 10 (high) Motivation for attending community support 5 (on scale 1 low-10 high).  The patient is motivated, to explore treatment.  She said \"of course I am partly here to fulfill requirements for liver transplant, but I want to learn healthy coping for lifelong sobriety.      Dimension 5: Relapse / Continued Problem Potential -   Previous Dimension Ratin  Current Dimension Ratin  Relapses this week - None  Urges to use - None  UA results - No results found for this or any previous visit (from the past 168 hour(s)).  Using ReSet Clem: N/A    Narrative-  Wk of . Dim 5: warning signs, " "triggers and cravings: none   helpful coping: attending group. Examples of benefits of drinking or drug use, or other behaviors Physical, Social mental or emotional: confidence, escape from reality, \"friends\"     Main drawbacks connected to these: not real friends that only want to drink together.  Headaches and slurred speech.  Remoarse  Benefits connected to abstinence from addictive behaviors:more able to do me and be me, I enjoy trud friends, I remember things better and will have better memories      Drawbacks you see connected with abstinence from these:  Wk  she participated in the daily reading and said she liked it.  Virginia also gave meaningful feedback to client who discussed use episode: \"it could be anyone of us\"  She said this is important and she is learning from others sharing.  Week of 23: Client denies riggers or warning signs this past week. She said she liked the saying \"it is just as easy to create good habits as bad ones\"   Patient reports family members and her  are concerned about their substance use.  Patient reports their recovery goals are \"abstinence forever.\"  She has had no previous detoxes or treatments and needs to gain insight into healthy coping for relapse prevention.      Dimension 6: Recovery Environment -   Previous Dimension Ratin  Current Dimension Ratin  Family Involvement - not at this time  Summarize attendance at family groups and family sessions - NA  Family supportive of treatment?  Yes    Community support group attendance - no  Recreational activities - some camping, cooking and boating    Narrative - wk of client discussed resources sober support, including AA, Liver Transplant support and [place] recovery connection  wk   goals for the wknd: look into volunteering at [facility] for writing card.  \"no community support attendance at this time\"   Client is not currently attending community sup[ort and does not have a sponsor. Client's supportive " "people are her family. Client shared she has friends and has not talked with many about her current situation, because \"I am a private person,\" (treatment and needing transplant).  Patient reports they are involved in community of holden activities    They reports spirituality impacts recovery in the following ways:  \"There was some changes as far as .  We do belong to a [employer] but it's been a number of years since we've been.\"  Patient believes her spirituality and sobriety are connected. Patient reported having 3 children, all adults.  Patient has 2 grandchildren.    She lives with her spouse and son. recreational/leisure activities: camping, cooking, boating, playing board games and card games.  Patient is currently disabled due to her Crohn's Disease.  Patient reports their income is obtained through SSDI disability; spouse.  Patient does not identify finances as a current stressor.         Progress made on transition planning goals: just began tx    Justification for Continued Treatment at this Level of Care:  No previous tx, needs 6 months of sobriety to be eligible for a transplant, needs to learn long term relapse prevention, she reports finding that attending group is helpful for her and she is learning.  Treatment coordination activities this week:  ARNEL's tay  Need for peer recovery support referral? No    Discharge Planning: Not at this time        Has vulnerable adult status change? No    Interdisciplinary Clinical Supervision including: no    Are Treatment Plan goals/objectives effective? Yes  *If no, list changes to treatment plan:    Are the current goals meeting client's needs? Yes  *If no, list the changes to treatment plan.  Plan:  Continue per Master Treatment Plan    *Client agrees with any changes to the treatment plan: Yes  *Client received copy of changes: Yes  *Client is aware of right to access a treatment plan review: Yes     Staff Members Contributing To Weekly Review:    Judi" RAFIQ Dodd, MS, LADC, LPC  Lead Counselor Adult [facility] IOP/OP Programs Darinel Au, DD, LMFT, LADC, CGTP-[place] [facility] Psych[facility]

## 2023-06-13 ENCOUNTER — OFFICE VISIT (OUTPATIENT)
Dept: URGENT CARE | Facility: URGENT CARE | Age: 58
End: 2023-06-13
Payer: COMMERCIAL

## 2023-06-13 ENCOUNTER — TELEPHONE (OUTPATIENT)
Dept: GASTROENTEROLOGY | Facility: CLINIC | Age: 58
End: 2023-06-13

## 2023-06-13 ENCOUNTER — VIRTUAL VISIT (OUTPATIENT)
Dept: GASTROENTEROLOGY | Facility: CLINIC | Age: 58
End: 2023-06-13
Payer: COMMERCIAL

## 2023-06-13 VITALS
HEART RATE: 82 BPM | TEMPERATURE: 98.5 F | RESPIRATION RATE: 18 BRPM | SYSTOLIC BLOOD PRESSURE: 120 MMHG | DIASTOLIC BLOOD PRESSURE: 78 MMHG | OXYGEN SATURATION: 100 %

## 2023-06-13 DIAGNOSIS — T36.95XA ANTIBIOTIC-INDUCED YEAST INFECTION: ICD-10-CM

## 2023-06-13 DIAGNOSIS — K50.113 CROHN'S DISEASE OF LARGE INTESTINE WITH FISTULA (H): Primary | ICD-10-CM

## 2023-06-13 DIAGNOSIS — L08.9 LOCAL INFECTION OF SKIN AND SUBCUTANEOUS TISSUE: Primary | ICD-10-CM

## 2023-06-13 DIAGNOSIS — B37.9 ANTIBIOTIC-INDUCED YEAST INFECTION: ICD-10-CM

## 2023-06-13 PROCEDURE — 99214 OFFICE O/P EST MOD 30 MIN: CPT | Mod: VID | Performed by: INTERNAL MEDICINE

## 2023-06-13 PROCEDURE — 99213 OFFICE O/P EST LOW 20 MIN: CPT | Performed by: PHYSICIAN ASSISTANT

## 2023-06-13 RX ORDER — FLUCONAZOLE 150 MG/1
150 TABLET ORAL ONCE
Qty: 2 TABLET | Refills: 0 | Status: SHIPPED | OUTPATIENT
Start: 2023-06-13 | End: 2023-06-13

## 2023-06-13 RX ORDER — CEPHALEXIN 500 MG/1
500 CAPSULE ORAL 3 TIMES DAILY
Qty: 30 CAPSULE | Refills: 0 | Status: SHIPPED | OUTPATIENT
Start: 2023-06-13 | End: 2023-06-23

## 2023-06-13 ASSESSMENT — ENCOUNTER SYMPTOMS
NAIL CHANGES: 0
VOMITING: 0
RECTAL PAIN: 1
MUSCLE WEAKNESS: 1
MUSCLE CRAMPS: 1
BACK PAIN: 0
SKIN CHANGES: 0
BREAST PAIN: 1
NAUSEA: 0
DIARRHEA: 1
NECK PAIN: 0
JOINT SWELLING: 0
BLOOD IN STOOL: 0
BLOATING: 1
HEARTBURN: 0
CONSTIPATION: 0
BREAST MASS: 0
POOR WOUND HEALING: 0
ABDOMINAL PAIN: 1
JAUNDICE: 0
ARTHRALGIAS: 1
MYALGIAS: 0

## 2023-06-13 ASSESSMENT — PAIN SCALES - GENERAL: PAINLEVEL: MODERATE PAIN (4)

## 2023-06-13 NOTE — PROGRESS NOTES
IBD CLINIC VISIT    CC/REFERRING MD:  Referred Self  REASON FOR CONSULTATION: Crohn's.     ASSESSMENT/PLAN:  57 year old female with cirrhosis and Crohn's    1.  Crohn's disease:   Current Medications:     - Infliximab 5 mg/kg every 4 weeks: starting 6/2023. Previously 10mg/kg since 2020. Dose reduced due to high trough level (>34 in 2/2023)  Current disease activity: HBI: 8 - unclear how much symptoms related to inflammation.   Last endoscopic disease activity: 3/1/21: Normal flex sig.   Last radiographic disease activity: Normal MRE 6/16/22    Infliximab history- started May 2019. Dose increase to 10 mg/kg every 8 weeks but had continued active disease, dose increase to 10 mg/kg every 4 weeks 11/2020. Trough level high at >34 in 3/2023 in the setting of new HCC. Dose reduced to 5 mg/kg every 4 weeks in 6/2023 to attempt to achieve a balance between IBD control and not too much immunosuppression in the setting of HCC. Goal of trough     Crohn's disease remains in remission at this time.  She has predominately had perianal disease which is not active currently.  Her has seton. She reports a small area near tailbone that starts to drain when the next dose of infliximab was due and then heals in a week after infusion, which is chronic.      -- Continue infliximab at 5mg/kg every 4 weeks. Will obtain trough prior to the 4th infusion at this dose (early September).   -- Routine labs with infliximab, every other infusion    2. Cirrhosis with HCC s/p radioembolization by IR 3/2023. Currently being worked up for liver transplant. HCC is decreasing in size.   -- Follow-up in the liver clinic and IR.     3. Umbilical drainage: Eval with dermatology presumed psoriasis.  Discussed the potential of drug-induced psoriasis.  However given the benefit of infliximab for her perianal disease would not stop infliximab unless psoriasis progressed.  There is no evidence of fistula on MRE.  4. Cellulitis: Had cellulitis in 12/2022 which  led to incidental finding of HCC. Now recurrence of LE rash.   -- Dermatology/urgent care evaluation    Cancer Screening:  Colon cancer screening: Does not clearly have colonic disease. Tentative plan for repeat colonoscopy in 2024.     Misc:  -- Avoid tobacco use  -- Avoid NSAIDs as there is potentially a 25% chance of causing an IBD flare      Return to clinic in 4-6 months with me and Dr. Diaz.     Patient discussed and seen with staff gastroenterologist Dr. Schilling who agrees with the plan.     Jovita Fuller MD PhD  GI Fellow   [phone]        IBD HISTORY  Age at diagnosis: 53 (4/2019)  Extent of disease: miguel angel-anal, anal  Disease phenotype: Miguel Angel-anal fistula  Miguel Angel-anal disease: Yes  Prior IBD surgeries: No. Seton placements  Prior IBD Medications: None    DRUG MONITORING  TPMT enzyme activity:     6-TGN/6-MMPN levels:    Biologic concentration:  10/2/2019: IFX 0.8, anti-IFX antibodies: 451 (Esoterix)  1/22/2020 infliximab level 2.3, no antibodies (this is an 8-week trough at 10 mg/kg)  3/4/21 IFX 25.1, no antibodies (4 week trough on 10mg/kg)  2/27/2023 IFX >34, no antibody (4 week trough on 10mg/kg)    sIBDQ:   IBDQ Score Date IBDQ - Total Score  (Higher score better)   6/13/2023   1:28 PM 45   5/31/2023   3:26 PM 51   9/30/2022   6:54 AM 49       HPI:   Overall is doing OK. Here for follow-up.     Her GI symptoms are stable. She has 4-5 BMs daily, sometimes loose based on what she eats.     She has been under stress from taking care of aging parent and dealing with her HCC treatment.     No new fistula, has seton. No active miguel angel-anal disease.     She continues to have draining from her naval and a small area at the tailbone prior to each infusion which then resolve one week after infusion. This has been going on since her dx of CD in 2019.     Her rash on her LE has recurred. She thought it could be cellulitis and tries to see dermatology soon. Otherwise she plans to go to urgent care.     HBI:  Overall  patient well being (prior day): 1 (below average)  Abdominal pain (prior day): 0 (None)  Number of liquid or soft stools (prior day): 4-5 (1 point per stool)  Abdominal mass on exam: 0 (None)  Complications (1 point for each):   Arthralgia  Aphthous ulcers     Remission <5  Mild activity 5-7  Moderate activity 8-16  Severe > 16    ROS:    Constitutional, HEENT, cardiovascular, pulmonary, GI, , musculoskeletal, neuro, skin, endocrine and psych systems are negative, except as otherwise noted.     PERTINENT PAST MEDICAL HISTORY:  Past Medical History:   Diagnosis Date     Alcohol abuse      Alcoholic cirrhosis of liver with ascites      Anal fistula      Atypical ductal hyperplasia of breast 09/10/2010    ERT not recommended -left - and flat epithelial atypia-scheduled for breast biopsy 9/17/2010      Bifid uvula      Cholelithiasis      Contact perianal dermatitis and other eczema     recurrent - clobetasol      Crohn disease      Fear of flying      - gets Ativan prn.      HCC (hepatocellular carcinoma) (H) 2/1/2023     Hypertension      IBS (irritable bowel syndrome)        PREVIOUS SURGERIES:  Past Surgical History:   Procedure Laterality Date     BIOPSY ANAL N/A 3/28/2019    anal biopsy and culure placement of seton - Dr Fleming     BIOPSY BREAST Left 09/17/2010    - scheduled with Dr. Varma      BIOPSY LIVER  2019     COLONOSCOPY  2006     COLONOSCOPY N/A 12/23/2014    Procedure: COMBINED COLONOSCOPY, SINGLE OR MULTIPLE BIOPSY/POLYPECTOMY BY BIOPSY;  Surgeon: Diane Fleming MD;  Location:  GI     COLONOSCOPY N/A 12/5/2019    Procedure: COLONOSCOPY, WITH POLYPECTOMY AND BIOPSY;  Surgeon: Farhan Schilling MD;  Location:  GI     ENDOSCOPIC RETROGRADE CHOLANGIOPANCREATOGRAM N/A 4/24/2020    Procedure: ENDOSCOPIC RETROGRADE CHOLANGIOPANCREATOGRAPHY WITH, sledge removal,sphincterotomy, stent in gallbladder and pancreatic duct stent, and balloon dilation;  Surgeon: Guru Isac Kraft  MD Jessica;  Location: [facility] OR     ESOPHAGOSCOPY, GASTROSCOPY, DUODENOSCOPY (EGD), COMBINED N/A 12/5/2019    Procedure: ESOPHAGOGASTRODUODENOSCOPY (EGD);  Surgeon: Farhan Schilling MD;  Location: [facility] GI     ESOPHAGOSCOPY, GASTROSCOPY, DUODENOSCOPY (EGD), COMBINED N/A 5/11/2023    Procedure: Esophagoscopy, gastroscopy, duodenoscopy (EGD), combined;  Surgeon: Jeannette Cervantes MD;  Location: [facility] GI     EXAM UNDER ANESTHESIA ANUS N/A 3/28/2019    Procedure: EXAM UNDER ANESTHESIA ANUS;  Surgeon: Diane Fleming MD;  Location:  OR     EXAM UNDER ANESTHESIA ANUS N/A 2/26/2021    Procedure: EXAM UNDER ANESTHESIA OF ANUS, SETON PLACEMENT, EXCISION OF SKIN BRIDGE;  Surgeon: Avtar Nam MD;  Location: [facility] OR     EXAM UNDER ANESTHESIA ANUS N/A 1/6/2023    Procedure: EXAM UNDER ANESTHESIA, ANUS, SETON EXCHANGE, partial fistulotomy;  Surgeon: Mary Dugan MD;  Location: [facility] OR     HYSTERECTOMY, VAGINAL  2006    with Dr. Licha Zhou - with BSO for fibroids      IR GALLBLADDER DRAIN PLACEMENT  4/22/2020     IR SIRT (SELECTIVE INTERNAL RADIO THERAPY)  2/21/2023     IR VISCERAL ANGIOGRAM  2/21/2023     IR VISCERAL EMBOLIZATION  2/27/2023     OPEN REDUCTION INTERNAL FIXATION ANKLE Left at age 27    plates and screws removed at age 36     Pelviscopy with removal of bilateral hydrosalpinges.  04/15/2010     ZZC APPENDECTOMY  at age 14       PREVIOUS ENDOSCOPY:  3/7/19: Flex sig: Colon and rectum appeared normal. 2 large deep ulcers extending from the anal cnaal to left perianal area.     10/23/18: Colonoscopy: Endoscopically normal ileum.  Mild pan colonic congestion with contact friability thought to be related to portal hypertension and thrombocytopenia.  Prior anal verge ulcer is healed    12/23/14: Colonoscopy: Perianal skin tag noted.  Ileum normal, colon normal.    ALLERGIES:     Allergies   Allergen Reactions     Fish Oil      Redness and itching around eye area only - went away when fish  oil capsules stopped      Metronidazole      pain/itching     Ppd [Tuberculin Purified Protein Derivative]      Sulfa Antibiotics      hives     Terbinafine And Related      Lamisil = mild urticarial reaction       PERTINENT MEDICATIONS:    Current Outpatient Medications:      ammonium lactate (AMLACTIN) 12 % external cream, Apply topically 2 times daily, Disp: 385 g, Rfl: 3     furosemide (LASIX) 40 MG tablet, TAKE 1 TABLET BY MOUTH  DAILY, Disp: 90 tablet, Rfl: 3     inFLIXimab (REMICADE) 100 MG injection, Inject into the vein once Next dose 12/16/22, Disp:  , Rfl:      Multiple Vitamins-Minerals (MULTIVITAMIN & MINERAL PO), Take by mouth every morning, Disp: , Rfl:      spironolactone (ALDACTONE) 100 MG tablet, Take 1 tablet (100 mg) by mouth every morning, Disp: 90 tablet, Rfl: 3     triamcinolone (KENALOG) 0.1 % external cream, Apply to AA BID x 1-2 week then PRN only, Disp: 80 g, Rfl: 3  No current facility-administered medications for this visit.    Facility-Administered Medications Ordered in Other Visits:      BREEZA Lemon-Lime flavored oral liquid for Neutral Abdominal/Pelvic Imaging 1,000 mL, 1,000 mL, Oral, Once, Farhan Schilling MD     hyoscyamine (LEVSIN/SL) sublingual tablet 125 mcg, 125 mcg, Sublingual, Q4H PRN, Farhan Schilling MD    SOCIAL HISTORY:  Social History     Socioeconomic History     Marital status:      Spouse name: Albino     Number of children: 3     Years of education: 14     Highest education level: Not on file   Occupational History     Occupation: unemployed   Tobacco Use     Smoking status: Never     Passive exposure: Never     Smokeless tobacco: Never   Vaping Use     Vaping status: Never Used   Substance and Sexual Activity     Alcohol use: Not Currently     Drug use: No     Comment: no herbal meds either     Sexual activity: Yes     Partners: Male     Birth control/protection: Post-menopausal     Comment: husb had vasectomy   Other Topics Concern       Service No     Blood Transfusions No     Caffeine Concern No     Comment: rarely drinks caffeine     Occupational Exposure Not Asked     Hobby Hazards Not Asked     Sleep Concern Not Asked     Stress Concern Not Asked     Weight Concern Not Asked     Special Diet Not Asked     Back Care Not Asked     Exercise Yes     Comment: does a lot of walking - 4x/week     Bike Helmet Not Asked     Seat Belt Yes     Comment: always     Self-Exams Yes     Comment: SBE encouraged monthly     Parent/sibling w/ CABG, MI or angioplasty before 65F 55M? Yes   Social History Narrative    calcium - drinks 5-6 large glasses skim milk/day    flex sig/colonoscopy -at age 49    sun precautions - discussed    mammogram - needs every 2 years in her 30's, then yearly from then on    Td booster - 9/99 and 4/27/2010    pneumovax -at age 59    DEXA -when perimenopausal    stool hemoccults - every year after age 40    ASA- start at age 39    mulvitamin - encouraged     Social Determinants of Health     Financial Resource Strain: Low Risk  (9/15/2020)    Overall Financial Resource Strain (CARDIA)      Difficulty of Paying Living Expenses: Not very hard   Food Insecurity: No Food Insecurity (9/15/2020)    Hunger Vital Sign      Worried About Running Out of Food in the Last Year: Never true      Ran Out of Food in the Last Year: Never true   Transportation Needs: No Transportation Needs (9/15/2020)    PRAPARE - Transportation      Lack of Transportation (Medical): No      Lack of Transportation (Non-Medical): No   Physical Activity: Unknown (9/15/2020)    Exercise Vital Sign      Days of Exercise per Week: 0 days      Minutes of Exercise per Session: Not on file   Stress: Stress Concern Present (9/15/2020)    [employer] [place] [employer] - Occupational Stress Questionnaire      Feeling of Stress : Rather much   Social Connections: Unknown (9/15/2020)    Social Connection and Isolation Panel [NHANES]      Frequency of Communication with  Friends and Family: More than three times a week      Frequency of Social Gatherings with Friends and Family: More than three times a week      Attends Anabaptism Services: Not on file      Active Member of Clubs or Organizations: Not on file      Attends Club or Organization Meetings: Not on file      Marital Status: Not on file   Intimate Partner Violence: Not on file   Housing Stability: Not on file       FAMILY HISTORY:  Family History   Problem Relation Age of Onset     Breast Cancer Mother      Gastrointestinal Disease Mother      Heart Disease Father 57     Hypertension Maternal Grandmother      Substance Abuse Maternal Grandfather      Diabetes Maternal Grandfather      Diabetes Paternal Grandmother      Heart Disease Paternal Grandfather      Cancer Sister      Skin Cancer Sister      Substance Abuse Maternal Uncle      Substance Abuse Maternal Uncle      Suicide Niece      Suicide Niece      Anesthesia Reaction No family hx of      Thrombosis No family hx of        Past/family/social history reviewed and no changes    PHYSICAL EXAMINATION:  Constitutional: aaox3, cooperative, pleasant, not dyspneic/diaphoretic, no acute distress  Vitals reviewed: LMP 05/31/2006   Wt:   Wt Readings from Last 2 Encounters:   02/21/23 81.7 kg (180 lb 1.6 oz)   02/14/23 80.3 kg (177 lb)      Constitutional - general appearance is well and in no acute distress. Body habitus normal  Eyes - No redness or discharge  Respiratory - No cough, unlabored breathing  Musculoskeletal - range of motion intact: Neck and arms  Skin - patient reported rash on LEs.    Neurological - No tremors, headaches  Psychiatric - No anxiety, alert & oriented   Answers for HPI/ROS submitted by the patient on 6/13/2023  General Symptoms: No  Skin Symptoms: Yes  HENT Symptoms: No  EYE SYMPTOMS: No  HEART SYMPTOMS: No  LUNG SYMPTOMS: No  INTESTINAL SYMPTOMS: Yes  URINARY SYMPTOMS: No  GYNECOLOGIC SYMPTOMS: No  BREAST SYMPTOMS: Yes  SKELETAL SYMPTOMS:  Yes  BLOOD SYMPTOMS: No  NERVOUS SYSTEM SYMPTOMS: No  MENTAL HEALTH SYMPTOMS: No  Changes in hair: No  Changes in moles/birth marks: No  Itching: Yes  Rashes: Yes  Changes in nails: No  Acne: No  Hair in places you don't want it: No  Change in facial hair: No  Warts: No  Non-healing sores: No  Scarring: Yes  Flaking of skin: Yes  Color changes of hands/feet in cold : No  Sun sensitivity: No  Skin thickening: No  Heart burn or indigestion: No  Nausea: No  Vomiting: No  Abdominal pain: Yes  Bloating: Yes  Constipation: No  Diarrhea: Yes  Blood in stool: No  Black stools: No  Rectal or Anal pain: Yes  Yellowing of skin or eyes: No  Vomit with blood: No  Change in stools: Yes  Discharge: No  Lumps: No  Pain: Yes  Nipple retraction: No  Back pain: No  Muscle aches: No  Neck pain: No  Swollen joints: No  Joint pain: Yes  Bone pain: No  Muscle cramps: Yes  Muscle weakness: Yes  Bone fracture: No

## 2023-06-13 NOTE — LETTER
6/13/2023         RE: Abiola Matute  [address] [facility] [address]        Dear Colleague,    Thank you for referring your patient, Abiola Matute, to the [facility] [place]. Please see a copy of my visit note below.      I performed a history and physical examination of this patient and discussed the management with Dr. Fuller on 6/13/2023. I reviewed the note and there are no changes to the past medical, family or social history.  A complete 10 point review of systems was obtained. Please see the HPI for pertinent positives and negatives. All other systems were reviewed and were found to be negative. I agree with the documented findings and plan of care as outlined.    Discussed infliximab dosing and rationale for changes. Plan to continue to monitor her closely.     Discussed that typically we hold biologics in setting of active cancer. It is not clear that infliximab impacts solid tumors such as HCC, but in theory an attenuated immune response could impact cancer therapy or increase risk for infections. However she is not getting systemic chemotherapy at this time.     Should she need more aggressive chemo, we would reevaluate her biologic use. Otherwise, will try to keep her infliximab trough levels around 15-20.     Farhan Schilling MD  GI Attending  Pager: [phone]     IBD CLINIC VISIT    CC/REFERRING MD:  Referred Self  REASON FOR CONSULTATION: Crohn's.     ASSESSMENT/PLAN:  57 year old female with cirrhosis and Crohn's    1.  Crohn's disease:   Current Medications:     - Infliximab 5 mg/kg every 4 weeks: starting 6/2023. Previously 10mg/kg since 2020. Dose reduced due to high trough level (>34 in 2/2023)  Current disease activity: HBI: 8 - unclear how much symptoms related to inflammation.   Last endoscopic disease activity: 3/1/21: Normal flex sig.   Last radiographic disease activity: Normal MRE 6/16/22    Infliximab history- started May 2019. Dose  increase to 10 mg/kg every 8 weeks but had continued active disease, dose increase to 10 mg/kg every 4 weeks 11/2020. Trough level high at >34 in 3/2023 in the setting of new HCC. Dose reduced to 5 mg/kg every 4 weeks in 6/2023 to attempt to achieve a balance between IBD control and not too much immunosuppression in the setting of HCC. Goal of trough     Crohn's disease remains in remission at this time.  She has predominately had perianal disease which is not active currently.  Her has seton. She reports a small area near tailbone that starts to drain when the next dose of infliximab was due and then heals in a week after infusion, which is chronic.      -- Continue infliximab at 5mg/kg every 4 weeks. Will obtain trough prior to the 4th infusion at this dose (early September).   -- Routine labs with infliximab, every other infusion    2. Cirrhosis with HCC s/p radioembolization by IR 3/2023. Currently being worked up for liver transplant. HCC is decreasing in size.   -- Follow-up in the liver clinic and IR.     3. Umbilical drainage: Eval with dermatology presumed psoriasis.  Discussed the potential of drug-induced psoriasis.  However given the benefit of infliximab for her perianal disease would not stop infliximab unless psoriasis progressed.  There is no evidence of fistula on MRE.  4. Cellulitis: Had cellulitis in 12/2022 which led to incidental finding of HCC. Now recurrence of LE rash.   -- Dermatology/urgent care evaluation    Cancer Screening:  Colon cancer screening: Does not clearly have colonic disease. Tentative plan for repeat colonoscopy in 2024.     Misc:  -- Avoid tobacco use  -- Avoid NSAIDs as there is potentially a 25% chance of causing an IBD flare      Return to clinic in 4-6 months with me and Dr. Diaz.     Patient discussed and seen with staff gastroenterologist Dr. Schilling who agrees with the plan.     Jovita Fuller MD PhD  GI Fellow   [phone]        IBD HISTORY  Age at diagnosis: 53  (4/2019)  Extent of disease: miguel angel-anal, anal  Disease phenotype: Miguel Angel-anal fistula  Miguel Angel-anal disease: Yes  Prior IBD surgeries: No. Seton placements  Prior IBD Medications: None    DRUG MONITORING  TPMT enzyme activity:     6-TGN/6-MMPN levels:    Biologic concentration:  10/2/2019: IFX 0.8, anti-IFX antibodies: 451 (Esoterix)  1/22/2020 infliximab level 2.3, no antibodies (this is an 8-week trough at 10 mg/kg)  3/4/21 IFX 25.1, no antibodies (4 week trough on 10mg/kg)  2/27/2023 IFX >34, no antibody (4 week trough on 10mg/kg)    sIBDQ:   IBDQ Score Date IBDQ - Total Score  (Higher score better)   6/13/2023   1:28 PM 45   5/31/2023   3:26 PM 51   9/30/2022   6:54 AM 49       HPI:   Overall is doing OK. Here for follow-up.     Her GI symptoms are stable. She has 4-5 BMs daily, sometimes loose based on what she eats.     She has been under stress from taking care of aging parent and dealing with her HCC treatment.     No new fistula, has seton. No active miguel angel-anal disease.     She continues to have draining from her naval and a small area at the tailbone prior to each infusion which then resolve one week after infusion. This has been going on since her dx of CD in 2019.     Her rash on her LE has recurred. She thought it could be cellulitis and tries to see dermatology soon. Otherwise she plans to go to urgent care.     HBI:  Overall patient well being (prior day): 1 (below average)  Abdominal pain (prior day): 0 (None)  Number of liquid or soft stools (prior day): 4-5 (1 point per stool)  Abdominal mass on exam: 0 (None)  Complications (1 point for each):   Arthralgia  Aphthous ulcers     Remission <5  Mild activity 5-7  Moderate activity 8-16  Severe > 16    ROS:    Constitutional, HEENT, cardiovascular, pulmonary, GI, , musculoskeletal, neuro, skin, endocrine and psych systems are negative, except as otherwise noted.     PERTINENT PAST MEDICAL HISTORY:  Past Medical History:   Diagnosis Date    Alcohol abuse      Alcoholic cirrhosis of liver with ascites     Anal fistula     Atypical ductal hyperplasia of breast 09/10/2010    ERT not recommended -left - and flat epithelial atypia-scheduled for breast biopsy 9/17/2010     Bifid uvula     Cholelithiasis     Contact perianal dermatitis and other eczema     recurrent - clobetasol     Crohn disease     Fear of flying      - gets Ativan prn.     HCC (hepatocellular carcinoma) (H) 2/1/2023    Hypertension     IBS (irritable bowel syndrome)        PREVIOUS SURGERIES:  Past Surgical History:   Procedure Laterality Date    BIOPSY ANAL N/A 3/28/2019    anal biopsy and culure placement of seton - Dr Fleming    BIOPSY BREAST Left 09/17/2010    - scheduled with Dr. Varma     BIOPSY LIVER  2019    COLONOSCOPY  2006    COLONOSCOPY N/A 12/23/2014    Procedure: COMBINED COLONOSCOPY, SINGLE OR MULTIPLE BIOPSY/POLYPECTOMY BY BIOPSY;  Surgeon: Diane Fleming MD;  Location:  GI    COLONOSCOPY N/A 12/5/2019    Procedure: COLONOSCOPY, WITH POLYPECTOMY AND BIOPSY;  Surgeon: Farhan Schilling MD;  Location: [facility] GI    ENDOSCOPIC RETROGRADE CHOLANGIOPANCREATOGRAM N/A 4/24/2020    Procedure: ENDOSCOPIC RETROGRADE CHOLANGIOPANCREATOGRAPHY WITH, sledge removal,sphincterotomy, stent in gallbladder and pancreatic duct stent, and balloon dilation;  Surgeon: Guru Isac Kraft MD;  Location: [facility] OR    ESOPHAGOSCOPY, GASTROSCOPY, DUODENOSCOPY (EGD), COMBINED N/A 12/5/2019    Procedure: ESOPHAGOGASTRODUODENOSCOPY (EGD);  Surgeon: Farhan Schilling MD;  Location: [facility] GI    ESOPHAGOSCOPY, GASTROSCOPY, DUODENOSCOPY (EGD), COMBINED N/A 5/11/2023    Procedure: Esophagoscopy, gastroscopy, duodenoscopy (EGD), combined;  Surgeon: Jeannette Cervantes MD;  Location: [facility] GI    EXAM UNDER ANESTHESIA ANUS N/A 3/28/2019    Procedure: EXAM UNDER ANESTHESIA ANUS;  Surgeon: Diane Fleming MD;  Location:  OR    EXAM UNDER ANESTHESIA ANUS N/A 2/26/2021    Procedure: EXAM UNDER  ANESTHESIA OF ANUS, SETON PLACEMENT, EXCISION OF SKIN BRIDGE;  Surgeon: Avtar Nam MD;  Location: [facility] OR    EXAM UNDER ANESTHESIA ANUS N/A 1/6/2023    Procedure: EXAM UNDER ANESTHESIA, ANUS, SETON EXCHANGE, partial fistulotomy;  Surgeon: Mary Dugan MD;  [place] [facility] [place]    HYSTERECTOMY, VAGINAL  2006    with Dr. Licha Zhou - with BSO for fibroids     IR GALLBLADDER DRAIN PLACEMENT  4/22/2020    IR SIRT (SELECTIVE INTERNAL RADIO THERAPY)  2/21/2023    IR VISCERAL ANGIOGRAM  2/21/2023    IR VISCERAL EMBOLIZATION  2/27/2023    OPEN REDUCTION INTERNAL FIXATION ANKLE Left at age 27    plates and screws removed at age 36    Pelviscopy with removal of bilateral hydrosalpinges.  04/15/2010    ZZC APPENDECTOMY  at age 14       PREVIOUS ENDOSCOPY:  3/7/19: Flex sig: Colon and rectum appeared normal. 2 large deep ulcers extending from the anal cnaal to left perianal area.     10/23/18: Colonoscopy: Endoscopically normal ileum.  Mild pan colonic congestion with contact friability thought to be related to portal hypertension and thrombocytopenia.  Prior anal verge ulcer is healed    12/23/14: Colonoscopy: Perianal skin tag noted.  Ileum normal, colon normal.    ALLERGIES:     Allergies   Allergen Reactions    Fish Oil      Redness and itching around eye area only - went away when fish oil capsules stopped     Metronidazole      pain/itching    Ppd [Tuberculin Purified Protein Derivative]     Sulfa Antibiotics      hives    Terbinafine And Related      Lamisil = mild urticarial reaction       PERTINENT MEDICATIONS:    Current Outpatient Medications:     ammonium lactate (AMLACTIN) 12 % external cream, Apply topically 2 times daily, Disp: 385 g, Rfl: 3    furosemide (LASIX) 40 MG tablet, TAKE 1 TABLET BY MOUTH  DAILY, Disp: 90 tablet, Rfl: 3    inFLIXimab (REMICADE) 100 MG injection, Inject into the vein once Next dose 12/16/22, Disp:  , Rfl:     Multiple Vitamins-Minerals (MULTIVITAMIN & MINERAL PO), Take by  mouth every morning, Disp: , Rfl:     spironolactone (ALDACTONE) 100 MG tablet, Take 1 tablet (100 mg) by mouth every morning, Disp: 90 tablet, Rfl: 3    triamcinolone (KENALOG) 0.1 % external cream, Apply to AA BID x 1-2 week then PRN only, Disp: 80 g, Rfl: 3  No current facility-administered medications for this visit.    Facility-Administered Medications Ordered in Other Visits:     BREEZA Lemon-Lime flavored oral liquid for Neutral Abdominal/Pelvic Imaging 1,000 mL, 1,000 mL, Oral, Once, Farhan Schilling MD    hyoscyamine (LEVSIN/SL) sublingual tablet 125 mcg, 125 mcg, Sublingual, Q4H PRN, Farhan Schilling MD    SOCIAL HISTORY:  Social History     Socioeconomic History    Marital status:      Spouse name: Albino    Number of children: 3    Years of education: 14    Highest education level: Not on file   Occupational History    Occupation: unemployed   Tobacco Use    Smoking status: Never     Passive exposure: Never    Smokeless tobacco: Never   Vaping Use    Vaping status: Never Used   Substance and Sexual Activity    Alcohol use: Not Currently    Drug use: No     Comment: no herbal meds either    Sexual activity: Yes     Partners: Male     Birth control/protection: Post-menopausal     Comment: husb had vasectomy   Other Topics Concern     Service No    Blood Transfusions No    Caffeine Concern No     Comment: rarely drinks caffeine    Occupational Exposure Not Asked    Hobby Hazards Not Asked    Sleep Concern Not Asked    Stress Concern Not Asked    Weight Concern Not Asked    Special Diet Not Asked    Back Care Not Asked    Exercise Yes     Comment: does a lot of walking - 4x/week    Bike Helmet Not Asked    Seat Belt Yes     Comment: always    Self-Exams Yes     Comment: SBE encouraged monthly    Parent/sibling w/ CABG, MI or angioplasty before 65F 55M? Yes   Social History Narrative    calcium - drinks 5-6 large glasses skim milk/day    flex sig/colonoscopy -at age 49    sun  precautions - discussed    mammogram - needs every 2 years in her 30's, then yearly from then on    Td booster - 9/99 and 4/27/2010    pneumovax -at age 59    DEXA -when perimenopausal    stool hemoccults - every year after age 40    ASA- start at age 39    mulvitamin - encouraged     Social Determinants of Health     Financial Resource Strain: Low Risk  (9/15/2020)    Overall Financial Resource Strain (CARDIA)     Difficulty of Paying Living Expenses: Not very hard   Food Insecurity: No Food Insecurity (9/15/2020)    Hunger Vital Sign     Worried About Running Out of Food in the Last Year: Never true     Ran Out of Food in the Last Year: Never true   Transportation Needs: No Transportation Needs (9/15/2020)    PRAPARE - Transportation     Lack of Transportation (Medical): No     Lack of Transportation (Non-Medical): No   Physical Activity: Unknown (9/15/2020)    Exercise Vital Sign     Days of Exercise per Week: 0 days     Minutes of Exercise per Session: Not on file   Stress: Stress Concern Present (9/15/2020)    [employer] [place] [employer] - Occupational Stress Questionnaire     Feeling of Stress : Rather much   Social Connections: Unknown (9/15/2020)    Social Connection and Isolation Panel [NHANES]     Frequency of Communication with Friends and Family: More than three times a week     Frequency of Social Gatherings with Friends and Family: More than three times a week     Attends [employer] Services: Not on file     Active Member of Clubs or Organizations: Not on file     Attends Club or Organization Meetings: Not on file     Marital Status: Not on file   Intimate Partner Violence: Not on file   Housing Stability: Not on file       FAMILY HISTORY:  Family History   Problem Relation Age of Onset    Breast Cancer Mother     Gastrointestinal Disease Mother     Heart Disease Father 57    Hypertension Maternal Grandmother     Substance Abuse Maternal Grandfather     Diabetes Maternal Grandfather      Diabetes Paternal Grandmother     Heart Disease Paternal Grandfather     Cancer Sister     Skin Cancer Sister     Substance Abuse Maternal Uncle     Substance Abuse Maternal Uncle     Suicide Niece     Suicide Niece     Anesthesia Reaction No family hx of     Thrombosis No family hx of        Past/family/social history reviewed and no changes    PHYSICAL EXAMINATION:  Constitutional: aaox3, cooperative, pleasant, not dyspneic/diaphoretic, no acute distress  Vitals reviewed: LMP 05/31/2006   Wt:   Wt Readings from Last 2 Encounters:   02/21/23 81.7 kg (180 lb 1.6 oz)   02/14/23 80.3 kg (177 lb)      Constitutional - general appearance is well and in no acute distress. Body habitus normal  Eyes - No redness or discharge  Respiratory - No cough, unlabored breathing  Musculoskeletal - range of motion intact: Neck and arms  Skin - patient reported rash on LEs.    Neurological - No tremors, headaches  Psychiatric - No anxiety, alert & oriented   Answers for HPI/ROS submitted by the patient on 6/13/2023  General Symptoms: No  Skin Symptoms: Yes  HENT Symptoms: No  EYE SYMPTOMS: No  HEART SYMPTOMS: No  LUNG SYMPTOMS: No  INTESTINAL SYMPTOMS: Yes  URINARY SYMPTOMS: No  GYNECOLOGIC SYMPTOMS: No  BREAST SYMPTOMS: Yes  SKELETAL SYMPTOMS: Yes  BLOOD SYMPTOMS: No  NERVOUS SYSTEM SYMPTOMS: No  MENTAL HEALTH SYMPTOMS: No  Changes in hair: No  Changes in moles/birth marks: No  Itching: Yes  Rashes: Yes  Changes in nails: No  Acne: No  Hair in places you don't want it: No  Change in facial hair: No  Warts: No  Non-healing sores: No  Scarring: Yes  Flaking of skin: Yes  Color changes of hands/feet in cold : No  Sun sensitivity: No  Skin thickening: No  Heart burn or indigestion: No  Nausea: No  Vomiting: No  Abdominal pain: Yes  Bloating: Yes  Constipation: No  Diarrhea: Yes  Blood in stool: No  Black stools: No  Rectal or Anal pain: Yes  Yellowing of skin or eyes: No  Vomit with blood: No  Change in stools: Yes  Discharge: No  Lumps:  No  Pain: Yes  Nipple retraction: No  Back pain: No  Muscle aches: No  Neck pain: No  Swollen joints: No  Joint pain: Yes  Bone pain: No  Muscle cramps: Yes  Muscle weakness: Yes  Bone fracture: No          Again, thank you for allowing me to participate in the care of your patient.      Sincerely,    Jovita Fuller MD

## 2023-06-13 NOTE — TELEPHONE ENCOUNTER
June 13, 2023    Called Daniel Coleman message and contact info to return call regarding: Jury excuse letter      Sent mychart message with above request.

## 2023-06-13 NOTE — PATIENT INSTRUCTIONS
[place],     It was such a pleasure to see you today. We will recheck the infliximab level before your 4th dose at the 5mg/kg level, which should be around early September.     Dr. Diaz and I will see you back in 4-6 months.     Hope you have a great summer!    Dr. Fuller

## 2023-06-13 NOTE — PROGRESS NOTES
Virtual Visit Details    Type of service:  Video Visit     Originating Location (pt. Location): Home  Distant Location (provider location):  On-site  Platform used for Video Visit: Suma     I performed a history and physical examination of this patient and discussed the management with Dr. Fuller on 6/13/2023. I reviewed the note and there are no changes to the past medical, family or social history.  A complete 10 point review of systems was obtained. Please see the HPI for pertinent positives and negatives. All other systems were reviewed and were found to be negative. I agree with the documented findings and plan of care as outlined.    Discussed infliximab dosing and rationale for changes. Plan to continue to monitor her closely.     Discussed that typically we hold biologics in setting of active cancer. It is not clear that infliximab impacts solid tumors such as HCC, but in theory an attenuated immune response could impact cancer therapy or increase risk for infections. However she is not getting systemic chemotherapy at this time.     Should she need more aggressive chemo, we would reevaluate her biologic use. Otherwise, will try to keep her infliximab trough levels around 15-20.     Farhan Schilling MD  GI Attending  Pager: [phone]

## 2023-06-13 NOTE — TELEPHONE ENCOUNTER
----- Message from Farhan Schilling MD sent at 6/13/2023  3:33 PM CDT -----  Eber Khalil - I just sent you a draft letter for this patient to be excused from Jury duty    Do you know if there is a specific form we have to complete for this?  If so, can you prep the form so I can complete it?    If not, can you send this letter to the patient to see if its OK?    Thanks!

## 2023-06-13 NOTE — PROGRESS NOTES
Assessment & Plan     Local infection of skin and subcutaneous tissue  I believe patient had a flareup of her psoriasis in the lower extremities, for which she has been using triamcinolone cream. However, the affected area at the medial aspect of the left ankle is concerning for a secondary bacterial skin infection.  Keflex is prescribed today.  Continue using triamcinolone cream over the other affected area of dry patches of skin.  Keep monitoring symptoms.  Follow-up if any worsening symptoms.  Patient agrees.  - cephALEXin (KEFLEX) 500 MG capsule  Dispense: 30 capsule; Refill: 0  - fluconazole (DIFLUCAN) 150 MG tablet  Dispense: 2 tablet; Refill: 0    Antibiotic-induced yeast infection  Fluconazole is prescribed today as patient gets vaginal yeast infection following antibiotic intake.   - fluconazole (DIFLUCAN) 150 MG tablet  Dispense: 2 tablet; Refill: 0      Return in about 10 days (around 6/23/2023) for Symptoms failing to improve.    Jazmin Meng PA-C  [facility] [place]    Art Coleman is a 57 year old female who presents to clinic today for the following health issues:  Chief Complaint   Patient presents with     Derm Problem     Pt reports spreading rash on her L leg X a few weeks. Redness, dryness, cracking, itchy.      HPI  Patient with medical history significant for Crohn's, alcoholic liver cirrhosis, psoriasis, cholecystitis, anal fistula, cholecystitis, among others presenting to urgent care today with complaint of a rash to the lower extremities.  Onset of symptoms a couple weeks ago.  Has been using triamcinolone cream which has been helping.  This morning though has noted an area left medial ankle with increased erythema and drainage.  Reports skin feels warm to the touch. There is mild tenderness to palpation.  No fevers or chills.  Patient reports a hx of cellulitis and is concerned about it today.  Patient denies any changes in soaps, detergents, lotions.   Denies any exposures to plants.      Review of Systems  Constitutional, HEENT, cardiovascular, pulmonary, GI, , musculoskeletal, neuro, skin, endocrine and psych systems are negative, except as otherwise noted.      Objective    /78 (BP Location: Right arm, Patient Position: Sitting, Cuff Size: Adult Regular)   Pulse 82   Temp 98.5  F (36.9  C) (Tympanic)   Resp 18   LMP 05/31/2006   SpO2 100%   Physical Exam   GENERAL: healthy, alert and no distress  SKIN:  Dry oval patches of skin noted bilateral lower extremities.  Over the medial aspect of the left ankle is an area of increased erythema, also warm to the touch, tenderness to palpation,  Mild skin fissuring noted, scant amount of honey colored drainage.

## 2023-06-13 NOTE — TELEPHONE ENCOUNTER
----- Message from Jovita Fuller MD sent at 6/13/2023  2:10 PM CDT -----  Regarding: trough level of infliximab  Hi Remi,    Could you arrange for the trough level of infliximab after 3 doses of the reduced dose (5mg every 4 weeks)?    Her first reduce dose is around 6/8 or 6/9, so the trough level should be obtained in 3 months (9/8 or 9/9) before her 4th dose.    Thank you!    Jovita

## 2023-06-13 NOTE — NURSING NOTE
Is the patient currently in the state of [place]? YES    Visit mode:VIDEO    If the visit is dropped, the patient can be reconnected by: VIDEO VISIT: Text to cell phone: [phone]    Will anyone else be joining the visit? NO      How would you like to obtain your AVS? MyChart    Are changes needed to the allergy or medication list? NO    Reason for visit: RECHECK

## 2023-06-14 ENCOUNTER — HOSPITAL ENCOUNTER (OUTPATIENT)
Dept: BEHAVIORAL HEALTH | Facility: CLINIC | Age: 58
Discharge: HOME | End: 2023-06-14
Attending: FAMILY MEDICINE
Payer: COMMERCIAL

## 2023-06-14 ENCOUNTER — TELEPHONE (OUTPATIENT)
Dept: GASTROENTEROLOGY | Facility: CLINIC | Age: 58
End: 2023-06-14
Payer: MEDICARE

## 2023-06-14 PROCEDURE — H2035 A/D TX PROGRAM, PER HOUR: HCPCS | Mod: HQ

## 2023-06-14 NOTE — TELEPHONE ENCOUNTER
Left Voicemail (1st Attempt) for the patient to call back and schedule the following:    Appointment type: Return IBD  Provider: Jovita Fuller   Return date: 12/13/23  Specialty phone number: [phone]

## 2023-06-14 NOTE — GROUP NOTE
"Group Therapy Documentation    PATIENT'S NAME: Abiola Matute  MRN:   [id number]  :   1965  ACCT. NUMBER: [id number]  DATE OF SERVICE: 23  START TIME: 12:00 PM  END TIME:  2:00 PM  FACILITATOR(S): Judi Dodd LADC  TOPIC: BEH Group Therapy  Number of patients attending the group:  4  Group Length:  2 Hours    Group Therapy Type: Addiction and Psychoeducation    Summary of Group / Topics Discussed:    Balanced Lifestyle , Boundaries, Communication, Leisure Exploration/Use of Leisure Time, and Mindfulness    Today in group clients read opening meditation reading from Each Day a New Beginning, from [facility]. This reading was focused on Procrastination and how one can get angry or discouraged, due to procrastinating, and how over time it can really add up to feeling depressed or overwhelmed.  The reading encouraged focusing on just this day and one task at a time.  I referred to some excerpsts and points in a book by Jimy Montanez, called First Things First.  It talks about looking at values to clarify what things are a priority. I told them it discusses how we are often in urgency mode.  Clients discussed how they feel when in urgency.  I suggested that with awareness, we can learn to prioritize ourselves and balance and live less in urgency and the effects of that on us emotionally and physically.  We discussed clients assignments and psychoeducation: \"Alternatives to Addictive Behavior\".  I asked clients to name 2 things grateful for:  Something they can do that will make them feel better about self and something they can do for Recovery/addiction    Attestation: Chris Juares MD - Medical Director - Provides oversight and supervision of care.         Summary of Group / Topics Discussed:     Topic: Alternatives to Addictive Behavior  This topic will discuss benefits and drawbacks from substance use, as well as benefits and drawbacks of not using so as to make client aware of association and " "of helpful behaviors    Objective(s):   -Client will gain insight into benefits associated with use, drawbacks of use, benefits of   -Client will identify physical, social, and mental/emotional benefits and drawbacks    Structure:   -Facilitate group discussion around each patient s answers to questions  -Facilitate group discussion on what activities you enjoy now, might enjoy, could enjoy alone and could do with others      Expected therapeutic outcomes:    -Understanding how awareness of association of use can be helpful to choosing alternatives   -gain insight into things you enjoy now, or could enjoy      Therapeutic outcome(s) measured by:   -Patient s ability to complete assignment and discuss benefits and drawbacks  -patients ability to list what they enjoy, haven't tried and might enjoy, could do         Group Attendance:  Attended group session  Patient's response to the group topic/interactions:  cooperative with task, discussed personal experience with topic, and listened actively  Patient appeared to be Actively participating.        Client specific details: Virginia said her appt with her GI specialist went well, and he enocuraged her to go to ER yesterday.  She is glad she did because they told her she was close to having cellulitis and it could be worse.  Today's session focussed on procrastination and balance as a part of dim 6, also focused on dim 5 and the psychoeducation of  alternatives to addictive behavior    Client said of the daily reading: I don't tend to procrastinate much, but I can see anyone can for some reasons.  Examples of benefits of drinking or drug use, or other behaviors Physical, Social mental or emotional: confidence, escape from reality, \"friends\"     Main drawbacks connected to these: not real friends that only want to drink together.  Headaches and slurred speech.  Remoarse  Benefits connected to abstinence from addictive behaviors:more able to do me and be me, I enjoy trud " friends, I remember things better and will have better memories    One thing I can do that will make me feel better about self:Make a list. I keep too much in my head and a list would help    P)do the one thing that can help you feel better about self-make list    Attestation:   Jontahan Juares MD - Medical Director - Provides oversight and supervision of care.     Judi Dodd MS, [facility], LPC

## 2023-06-19 ENCOUNTER — HOSPITAL ENCOUNTER (OUTPATIENT)
Dept: BEHAVIORAL HEALTH | Facility: CLINIC | Age: 58
Discharge: HOME | End: 2023-06-19
Attending: FAMILY MEDICINE
Payer: COMMERCIAL

## 2023-06-19 PROCEDURE — H2035 A/D TX PROGRAM, PER HOUR: HCPCS | Mod: HQ

## 2023-06-19 NOTE — GROUP NOTE
"Psychoeducation Group Documentation    PATIENT'S NAME: Abiola Matute  MRN:   [id number]  :   1965  ACCT. NUMBER: [id number]  DATE OF SERVICE: 23  START TIME: 12:00 PM  END TIME:  2:00 PM  FACILITATOR(S): Judi Dodd LADC  TOPIC: BEH Pyschoeducation  Number of patients attending the group:  3  Group Length:  2 Hours    Skills Group Therapy Type: Dealing with anxiety and stress in healthy ways    Summary of Group / Topics Discussed:    .          Today in group clients read opening meditation reading from [employer], by Rosy. This reading was focused on spirituality and that we are never alone.  Clients gave their comments and I encouraged ongoing search and exploration with their spirituality, as they work their recovery program. We discussed, for quite some time, a clients \"resistance to accepting I have this disease\"   Group checked in on 1st 3 dimensions. We discussed \"Balance when under pressure\"  I reminded clients that many factors can influence how we feel during intense situations.  I discussed some are more prone to anxiety and other times it is about our normal functioning of our brain capacity. We went over strategies that can be helpful for \"keeping cool\"and dealing with difficult situations.  We discussed, Focusing on the now, remembering our resilience      Attestation: Chris Juares MD - Medical Director - Provides oversight and supervision of care.     Patient's response to the group topic/interactions:  cooperative with task, discussed personal experience with topic, expressed understanding of topic and gave appropriate feedback to peers    Patient appeared to be Actively participating, Attentive and Engaged.          Client specific details: Virginia expressed to her group how helpful they are to her and how much she appreciates all that members shared about their stories.  She said she gains such helpful insight for self.Today's session focussed on check in of first 3 " "dimensions.  It also focused on dim 3 and neurobiology of brain chemistry and fight or flight.    Client  said of the daily reading:  I am growing with my spirituality and connection    We discussed anticipating some situations and \"gearing up\" by practicing deep breathing. I reminded client of previous discussions where beliefs often guide how we feel about certain situations, but that we can reframe them, or look at them for the particular situation.    Dim1: ASHISH: no change reported  Dim2: medical issues or appts: \"none\"  Dim 3: \"no thoughts of self harm or concerns\"  on likert scale 1-low and 10 -high:   Anxiety:  1  stress: 1 depression 0  issues to discuss and Helpful coping: getting to know people better in group, hearing other's helpful coping.    P)Practice using the strategies discussed, in situations that come up, discuss in group.      Attestation:   Jonathna Juares MD - Medical Director - Provides oversight and supervision of care.     Judi Dodd, MS, LADC, LPC                                                     "

## 2023-06-19 NOTE — PROGRESS NOTES
"[facility] Weekly Treatment Plan Review      Attestation:   Jonathan Juares MD - Medical Director - Provides oversight and supervision of care.       Date Span:Monday: 23 through 23      Date     Group Therapy 2  2 2 hours 0 hours 0 hours       Individual Therapy                 Family Therapy                 Psychoeducation                 Other (Specify)                  Client has no absences this week    Client has one excused absence this week , due to medical appts.     Program Running Totals: (No Phase 1 IOP due to this program being only OP level of care)  Total # of Phase 2 OP Group Sessions:8 for 16 hours  Total # of Phase 3 OP Recovery Management Group Sessions: None    Total # of 1:1 Sessions:  1 hour BALWINDER   1 hour                                       Darinel                     Weekly Treatment Plan Review     Treatment Plan initiated on: .    Dimension1: Acute Intoxication/Withdrawal Potential -   Previous Dimension Ratin  Current Dimension Ratin  Date of Last Use 22  Any reports of withdrawal symptoms - No      Dimension 2: Biomedical Conditions & Complications -   Previous Dimension Ratin  Current Dimension Ratin  Medical Concerns:\"no\"  wk of  She saw Jazmin Barrios at Urgent care for skin issues.  She also said she met with her GI practioner  She reports having liver cancer and Crohn's disease.  \"right now I am doing pretty well  Outside medical appointments this week (list provider and reason for visit):  she saw Jazmin Meng PA-C for a flare up of her psoriasis likely related to her other medical issues, per client     Current Medications & Medication Changes:  Current Outpatient Medications   Medication     ammonium lactate (AMLACTIN) 12 % external cream     cephALEXin (KEFLEX) 500 MG capsule     furosemide (LASIX) 40 MG tablet     inFLIXimab (REMICADE) 100 MG " injection     Multiple Vitamins-Minerals (MULTIVITAMIN & MINERAL PO)     spironolactone (ALDACTONE) 100 MG tablet     triamcinolone (KENALOG) 0.1 % external cream     No current facility-administered medications for this encounter.     Facility-Administered Medications Ordered in Other Encounters   Medication     BREEZA Lemon-Lime flavored oral liquid for Neutral Abdominal/Pelvic Imaging 1,000 mL     hyoscyamine (LEVSIN/SL) sublingual tablet 125 mcg     Medication Prescriber:  Linda Macdonald  Taking meds as prescribed? Yes  Medication side effects or concerns:  no        Dimension 3: Emotional/Behavioral Conditions & Complications -   Previous Dimension Ratin  Current Dimension Ratin  PHQ2:       2023     1:34 PM 2023    10:00 AM 3/28/2023    11:21 AM 3/26/2023     1:45 PM 2023     9:35 AM 1/3/2023     2:46 PM 2022    11:02 AM   PHQ-2 (  [employer])   Q1: Little interest or pleasure in doing things 0 0 0 0 0 0 0   Q2: Feeling down, depressed or hopeless 0 0 0 0 1 0 0   PHQ-2 Score 0 0 0 0 1 0 0   Q1: Little interest or pleasure in doing things   Not at all  Not at all Not at all Not at all   Q2: Feeling down, depressed or hopeless   Not at all  Several days Not at all Not at all   PHQ-2 Score   0  1 0 0      GAD2:       2023     9:54 AM 2023    10:00 AM   TAINA-2   Feeling nervous, anxious, or on edge 1 1   Not being able to stop or control worrying 1 0   TAINA-2 Total Score 2 1     PROMIS 10-Global Health (all questions and answers displayed):       2023     9:58 AM 2023     7:32 PM 2023    10:00 AM   PROMIS 10   In general, would you say your health is: Good Good    In general, would you say your quality of life is: Good Good    In general, how would you rate your physical health? Fair Fair    In general, how would you rate your mental health, including your mood and your ability to think? Good Good    In general, how would you rate your satisfaction with your  social activities and relationships? Good Good    In general, please rate how well you carry out your usual social activities and roles Good Good    To what extent are you able to carry out your everyday physical activities such as walking, climbing stairs, carrying groceries, or moving a chair? Completely Completely    In the past 7 days, how often have you been bothered by emotional problems such as feeling anxious, depressed, or irritable? Sometimes Rarely    In the past 7 days, how would you rate your fatigue on average? Mild Mild    In the past 7 days, how would you rate your pain on average, where 0 means no pain, and 10 means worst imaginable pain? 3 3    In general, would you say your health is: 3 3 3   In general, would you say your quality of life is: 3 3 4   In general, how would you rate your physical health? 2 2 3   In general, how would you rate your mental health, including your mood and your ability to think? 3 3 4   In general, how would you rate your satisfaction with your social activities and relationships? 3 3 5   In general, please rate how well you carry out your usual social activities and roles. (This includes activities at home, at work and in your community, and responsibilities as a parent, child, spouse, employee, friend, etc.) 3 3 5   To what extent are you able to carry out your everyday physical activities such as walking, climbing stairs, carrying groceries, or moving a chair? 5 5 4   In the past 7 days, how often have you been bothered by emotional problems such as feeling anxious, depressed, or irritable? 3 2 2   In the past 7 days, how would you rate your fatigue on average? 2 2 2   In the past 7 days, how would you rate your pain on average, where 0 means no pain, and 10 means worst imaginable pain? 3 3 4   Global Mental Health Score 12 13 17   Global Physical Health Score 15 15 14   PROMIS TOTAL - SUBSCORES 27 28 31     Mental health diagnosis none  Date of last SIB:   "never  Date of  last SI:  never  Date of last HI: never  Behavioral Targets:  see tx plan  Risk factors:  Client dealing with liver disease/cancer and Crohns, client needs a new liver,   Protective factors:  spirituality, forward/future oriented thinking, safe and stable environment, regular physical activity, living with other people and structured day  Current MH Assignments:  see tx plan    Narrative: wk of 6/19 \"no thoughts of self harm or no concerns\".  She reports on likert scale 1-low and 10 -high:   Anxiety:  1  stress: 1 depression 0  issues to discuss and Helpful coping: getting to know people better in group, hearing other's helpful coping.  Attended MH skills group with Darinel Au.  We discussed anticipating some situations and \"gearing up\" by practicing deep breathing. I reminded client of previous discussions where beliefs often guide how we feel about certain situations, but that we can reframe them, or look at them for the particular situation.      Wk of 6/12  I am feeling much better and less anxiety about group attendance as I continue to attend and get to know others. \"no thoughts of self harm or concerns\". On likert scale 1-low and 10 -high:   Anxiety:  1  stress: 1 depression 1  issues to discuss and Helpful coping:  Breathing, getting to others in group  Wk of 6/6 \"no thoughts of self harm\"  Attended MH skills group with Darinel Au  Week of 5/31/23 :Client reports feeling anxious due to first treatment experience, not knowing what to expect, her  Mother's declining health-needing a placement for higher level of care, spouse recently lost his job-need to obtain COBRA insurance and unable to care for grandchildren because of attending treatment. Client was saw Traci Aguilar and has not seen them in about 6 weeks. To follow up with primary counselor re: seeing psychotherapist or return to [facility].  Client rates the following on a scale of 1 (low) to 10 (high):  Anxiety- 5 " " Depression -1  Stress- 5 as a result of above concerns.   Client participated in psychotherapy/education on self-care, introduction to relaxation meditation-tensing and relaxing muscles, and sleep hygiene.  Current Mental Health symptoms include: . Active interventions to stabilize mental health symptoms this week :   At SI her PHQ-2 score was 0 and his TAINA-2 score was 1.  PROMISE 10 was  31:  She reports that at this time she is dealing with her mother's living situation and health issues. She reports that her father has passed away and that she is estranged from 1 sister.           Dimension 4: Treatment Acceptance / Resistance -   Previous Dimension Ratin  Current Dimension Ratin  REN[facility] Diagnosis:  Alcohol Use Disorder   303.90 (F10.20) Severe In a controlled environment  Commitment to tx process/Stage of change- contemplation  [facility] assignments - see tx plan      Narrative -  Wk of :  Client reports motivation for sobriety is 10 (high) Motivation for attending community support 5 (on scale 1 low-10 high).   Virgniia expressed to her group how helpful they are to her and how much she appreciates all that members shared about their stories.  She said she gains such helpful insight for self   Week of  Virginia discussed that she has been late a couple times, but realized with 2 other members gone, how this speaks to the importance of being on time and valuing and respecting others time, too.   She will also miss tomorrows group due to a long awaited medical appt. Dim 4: on likert scale 1-low and 10 -high:  Motivation for sobriety:  10  motivation for sober support attendance: not doing yet  Wk of   Virginia also said she finds motivation for sobriety in her family and friends, \"but really deep down need to do this for myself\"  Told counselor and group how she didn't think she would get much out of tx and already is getting so much and is grateful for older adult group   Week of 23: Client reports " "motivation for sobriety is 10 (high) Motivation for attending community support 5 (on scale 1 low-10 high).  The patient is motivated, to explore treatment.  She said \"of course I am partly here to fulfill requirements for liver transplant, but I want to learn healthy coping for lifelong sobriety.      Dimension 5: Relapse / Continued Problem Potential -   Previous Dimension Ratin  Current Dimension Ratin  Relapses this week - None  Urges to use - None  UA results - No results found for this or any previous visit (from the past 168 hour(s)).  Using ReSet Clem: N/A    Narrative-  She participated in  Psychoeducation/Skills: Self-Care.  Client s completed self-care assessment and described self-care as feeling better physically. Client identified physical self-care they do well- attending medical appointments, one they do poorly or needs improvement- eating healthy food and barriers that prevent themselves from self-care - cost of food is expensive, the shopping and prep.  This topic will give a general overview of self-care: physical, environmental, emotional, social and spiritual. It will explore importance of self-care and the impact on recovery.   Wk of . Dim 5: warning signs, triggers and cravings: none   helpful coping: attending group. Examples of benefits of drinking or drug use, or other behaviors Physical, Social mental or emotional: confidence, escape from reality, \"friends\"     Main drawbacks connected to these: not real friends that only want to drink together.  Headaches and slurred speech.  Remoarse  Benefits connected to abstinence from addictive behaviors:more able to do me and be me, I enjoy trud friends, I remember things better and will have better memories      Drawbacks you see connected with abstinence from these:  Wk  she participated in the daily reading and said she liked it.  Virginia also gave meaningful feedback to client who discussed use episode: \"it could be anyone of us\"  " "She said this is important and she is learning from others sharing.  Week of 23: Client denies riggers or warning signs this past week. She said she liked the saying \"it is just as easy to create good habits as bad ones\"   Patient reports family members and her  are concerned about their substance use.  Patient reports their recovery goals are \"abstinence forever.\"  She has had no previous detoxes or treatments and needs to gain insight into healthy coping for relapse prevention.      Dimension 6: Recovery Environment -   Previous Dimension Ratin  Current Dimension Ratin  Family Involvement - not at this time  Summarize attendance at family groups and family sessions - NA  Family supportive of treatment?  Yes    Community support group attendance - no  Recreational activities - some camping, cooking and boating    Narrative -  Wk of .  Virginia reports with having group 3x per week, it is a good deal of support, \" I will continue to learn about peer support\"  wk of client discussed resources sober support, including AA, Liver Transplant support and [place] recovery connection  wk of   goals for the wknd: look into volunteering at [facility] for writing card.  \"no community support attendance at this time\"   Client is not currently attending community sup[ort and does not have a sponsor. Client's supportive people are her family. Client shared she has friends and has not talked with many about her current situation, because \"I am a private person,\" (treatment and needing transplant).  Patient reports they are involved in community of holden activities    They reports spirituality impacts recovery in the following ways:  \"There was some changes as far as .  We do belong to a Christian but it's been a number of years since we've been.\"  Patient believes her spirituality and sobriety are connected. Patient reported having 3 children, all adults.  Patient has 2 grandchildren.    She lives with her " spouse and son. recreational/leisure activities: camping, cooking, boating, playing board games and card games.  Patient is currently disabled due to her Crohn's Disease.  Patient reports their income is obtained through SSDI disability; spouse.  Patient does not identify finances as a current stressor.         Progress made on transition planning goals: just began tx    Justification for Continued Treatment at this Level of Care:  No previous tx, needs 6 months of sobriety to be eligible for a transplant, needs to learn long term relapse prevention, she reports finding that attending group is helpful for her and she is learning.She said she did not think she would get much out of this but is getting so much support and understanding of healthy coping.  She is not attending community support, at this time.    Treatment coordination activities this week: None  Need for peer recovery support referral? No    Discharge Planning: Not at this time        Has vulnerable adult status change? No    Interdisciplinary Clinical Supervision including: no    Are Treatment Plan goals/objectives effective? Yes  *If no, list changes to treatment plan:    Are the current goals meeting client's needs? Yes  *If no, list the changes to treatment plan.  Plan:  Continue per Master Treatment Plan    *Client agrees with any changes to the treatment plan: Yes  *Client received copy of changes: Yes  *Client is aware of right to access a treatment plan review: Yes     Staff Members Contributing To Weekly Review:    Judi Dodd, MS, LADC, LPC  Lead Counselor Adult [facility] IOP/OP Programs Darinel Au, DD, LMFT, LADC, CGTP-[place] Licensed MICD Psychotherapist

## 2023-06-20 ENCOUNTER — HOSPITAL ENCOUNTER (OUTPATIENT)
Dept: BEHAVIORAL HEALTH | Facility: CLINIC | Age: 58
Discharge: HOME | End: 2023-06-20
Attending: FAMILY MEDICINE
Payer: COMMERCIAL

## 2023-06-20 DIAGNOSIS — F10.20 ALCOHOL USE DISORDER, SEVERE, DEPENDENCE (H): Primary | ICD-10-CM

## 2023-06-20 PROCEDURE — H2035 A/D TX PROGRAM, PER HOUR: HCPCS | Mod: HQ

## 2023-06-20 NOTE — GROUP NOTE
"Attestation: Chris Juares MD - Medical Director - Provides oversight and supervision of care.    Group Therapy Documentation    PATIENT'S NAME: Abiola Matute  MRN:   [id number]  :   1965  ACCT. NUMBER: [id number]  DATE OF SERVICE: 23  START TIME: 12:00 PM  END TIME:  2:00 PM  FACILITATOR(S): Darinel Au LMFT, KSENIA  TOPIC: BEH Group Therapy  Number of patients attending the group:  3  Group Length:  2 Hours    Group Therapy Type: Addiction, Psychoeducation, Psychotherapeutic, and Skills/Education    Summary of Group / Topics Discussed:    Cognitive Therapy Techniques, Co-occurring Illness, Proper Nutrition & Exercise, Sleep Hygiene, Symptom Management, Thinking Errors/Negative Self-Talk, Trauma Informed Care, and Understanding and Managing Depression.    Group Attendance:  Abiola attended group session.    Client's response to the group topic/interactions:  Abiola was cooperative with task, discussed personal experience with topic, expressed readiness to alter behaviors, expressed understanding of topic, and listened actively throughout today's group session.     Abiola appeared to be actively participating, appeared attentive and appeared engaged. Abiola was insightful and was a good contributor in group today.       Client specific details:  On a 0-10 Likert Scale (0=No issues/symptoms & 10=worst issues/symptoms), Abiola checked-in with rating her depression as a \"0\", anxiety as a \"1\", and rated her cravings as a \"0\". On a different 0-10 Likert Scale (0=low motivation and attendance & 10=highest level of motivation and attendance), Abiola rated her motivation for sobriety as a \"10\", and lastly Abiola rated her attendance over this past week as a \"7\".  Overall Abiola reported feeling \"slightly anxious\" today. Abiola reported being grateful for \"this group\". Abiola was asked what strength she has that she can use to strengthen her recovery, in which Abiola stated, \"focusing on " "the long-term/forever results\". Today's group session focused on Dimension(s) 3 & 5. Abiola showed progress by being able to teach-back the skills she learned during today's group session. Please see additional details below for further specific details on group topic matter.  Abiola learned in-depth information and gained greater insight into \"Understanding and Overcoming Depression\".  WHAT IS DEPRESSION?  > Abiola learned that depression is a medical and emotional illness that influences young and old, and the rich and poor. It taps the shoulder of men and women alike. It may be chronic or occasional. Depression is global, indiscriminate toward race, has no cultural limitations, comes dressed in a myriad of dark colors, and often appears dreadfully unique. If it has hurt you once in the past, it can hurt you again. Struggles with depression are common for co-occurring disorder clients. The good news is treatment works.  TREATMENT WORKS  > Abiola learned even though depression is complicated and life-encompassing, finding a sense of normalcy is possible. Depression is rarely, if ever, a one-issue illness and its treatment must be comprehensive. It most often encompasses social, relational, occupational, financial, spiritual, emotional, and medical facets of life. More simply stated, depression touches every part of you. To be effective, treatment must target the troubled areas of living. If it has damaged you physically, medical intervention may be necessary. If it has weakened you socially, treatment can help strengthen your relationships. Going to the hospital for a few days when you are suicidal is vital, but it is not treatment for depression. An overnight stay in the psychiatric mill[facility] doesn't solve problems, target issues, or supply comprehensive intervention to the many areas of life often ruthlessly ravaged by depression. A hospital stay may be an entrance ramp on your road to recovery, but should rarely, " "if ever, be regarded as the finish line. Abiola was asked what treatments have they tried in the past for depression and what was the most helpful? Abiola stated, \"I have only done group therapy but not for depression. I really like this group therapy.\"  USING YOUR STRENGTHS AND ASSETS  > Abiola learned in addition to targeting problem areas that surround and produce depression, it is extremely valuable to recognize your strengths and personal or emotional resources. Recovery from depression is easier if you have a supportive spouse, compassionate group of friends, stable job, or a variety of other key qualities and attributes. Similar to tackling financial difficulties, when battling depression, assets need to be leveraged against personal liabilities. While it is helpful to understand what is wrong, recognizing your positive qualities is crucial. You can solve or reduce problems more effectively, increase your strengths and assets more intentionally, and use these qualities against problems more strategically. Applying your strengths against depression is key to your successful recovery. Watch for the tendency to think  I have every single imaginable problem and absolutely no strengths, positive qualities, or notable attributes to help.  Depression grows with negativity, and it feeds itself.  SYMPTOMS OF DEPRESSION   > Abiola learned the following symptoms of depression such as sad mood or crying spells, Low energy, Low motivation, [id number], Sleep problems, Loss of interest in usual activities, Loss of enjoyment, Low sex drive, Appetite changes, Pessimism, Trouble making decisions, Low self-esteem, Suicidal thoughts, Difficulty concentrating, Irritability, Hopelessness, Helplessness, Fatigue, Moving or speaking slowly, and/or Unexplained aches and pains. As a teach-back, Abiola was asked to provide some of her symptoms of depression that she is dealing with currently and/or experienced. Abiola " "stated, \"irritability and sleep problems.\"  OH, THOSE NEGATIVE THOUGHTS  > Abiola learned how many who struggle with depression carry a mindset of negativity, pessimism, hopelessness, and despair. This becomes one of the primary targets in combating depression. If you win the frequently fought minor skirmishes against negativity, you can win the war against depression. Failure to win, or reluctance to even engage in the brenner against negative thinking, reduces the likelihood of success in the war against depression. Remember, you can change how you feel by learning to think differently. While not easy, it is possible with guidance, perseverance, and practice. A client recently told me  The long haul of anything is boring to me.  For her, to hear steady and consistent practice over time is foundational for recovery was disheartening. Yet, with encouragement, she was willing to endure the boring and tedious for the sake of success. Systematically practicing the skills presented in your treatment program or this manual will make a difference in your journey to long-term sobriety. Abiola was asked what common negative thoughts she has had recently. Abiola stated, \"I say to myself, why didn't I get more things done.\" Abiola then practiced writing a positive replacement for her aforementioned negative thought.  NEGATIVE THINKING PERMEATES OUR WORLD  > Abiola learned how everywhere they turn they can hear negativity. The news is filled with up-to-date information on invasions, epidemics, disasters, terrorism, shootings, and impending economic collapse. The instantaneous arrival of brutal and barbaric events causes alarm in even the most sedate and peaceful among us. Dwelling on present negative news and past personal tragedies makes a convincing argument that danger is looming just ahead.  ENTERTAINING NEGATIVE THOUGHTS   > Abiola learned how the negative things they hear or see may be fact, but continually " reviewing them causes damage. The possession of a poison may be risky and unwise, but it is the sustained ingestion of the poison that produces eventual calamity. Having a short-lived disappointing, pessimistic, or worrisome thought is not the same as continually repeating it. I explained this concept to client and then asked her if she had just had a casual negative thought or more fully entertained it.  Entertained it?  she asked in humorous disbelief.  Well, I heard it knock, invited it in, cooked it dinner, and then I slept with it!  She understands, at least on one level, the tempting and seductive nature of negative thinking.   DEPRESSION FACTS   > Abiola learned the following facts related to depression.    Depression affects 121 million people worldwide (Morales, 2010).     In 2014, an estimated 15.7 million Americans had at least one major depressive episode in the last year. That represents 6.7% of the adult (age 18 and older) [place] population (NIM, 2015).     Two-thirds of people suffering from depression do not seek necessary treatment (WHO, 2001).     Eighty percent of all people with clinical depression who have received treatment significantly improve their lives (CDC, 2016).     The economic cost of depression is estimated at $210.5 billion a year, but the cost in human suffering cannot be estimated (Ramírez, 2015).     In the [place] depression ranks in the top three workplace issues, behind family crisis and stress (NNDC, 2016).     Women experience depression about twice as often as men (Castillo, 2016).     The rate of depression among veterans is five times higher than civilians (NNDC, 2016).     By the year 2020, the World Health Organization (WHO) estimates depression will be the number two cause of  lost years of healthy life  worldwide (Morales, 2010).     According to the [place] Centers for Disease Control and Prevention (CDC), suicide was the tenth leading cause of death in the [place] in 2014 (CDC, 2014).     Major depression is 1.5-3.0 times more common among first-degree biological relatives of those with the disorder than among the general population (David et al., 2007).    One in four young adults will suffer an episode of depression before age 24 (Anna, 2005).    Depression is the leading cause of disability in the [place] among people aged 15-44 (DC, 2016).  INEVITABLE DARK MOMENTS IN LIFE  > Abiola learned that with all the intense pain and agony that so often comes with depression, it is no wonder those who suffer wish it would fade away. Used the analogy of although I am a firm believer that morning is coming, I also appreciate the contrasting darkness of night. Perhaps there is value in the recognition and acceptance of some sad, grief-stricken, and morose moods. Maybe the joys of life are painted in rich emotional hues and intentionally positioned against the backdrop of the bleak, gray, and dark. This knowledge allows me to accept the surprising variations in life rather than hate them. As we work toward understanding depression, we can also learn to accept the lessons it teaches.  REDUCING DEPRESSION   > Abiola learned that while depression can feel overwhelming, small things can make a big difference to help in your recovery. Abiola was encouraged to look through the list of suggestions below and consider how they might practice a few, beginning today.   1. Be aware of what is causing your depression.   2. Try to be optimistic about the future.   3. Get socially active and find good emotional support.   4. Make a weekly schedule of your daily activities and do not forget to include social activities.   5. Find a hobby or two.   6. Do some volunteer work to keep yourself active.   7. Try to be aware of your negative thoughts and replace them with positive ideas.   8. Focus on your positive experiences.   9. Review what went right today, not what might go wrong  tomorrow.   10. Base your mood on your positive character traits rather than your weight, size, or general appearance. Exercise daily.   11. Set realistic goals and learn to accept that everyone has different abilities. Concentrate on your unique characteristics and positive accomplishments.   12. Get professional help if depression symptoms persist. Therapy for depression can help increase problem-solving skills.  13. Learn to examine and change negative thought patterns, find more adaptive behavior patterns, resolve relational conflicts, and learn social skills.   14. Know and develop your strengths. Focus on your positive attributes and characteristics.   15. Ask for help. A healthcare provider can help you find further treatment options.   16. Keep your medical and other appointments.   17. Stay on your medications and take them as directed. Do not make any changes to your medications without first consulting your doctor.   18. Take time for yourself every day.   19. Know your support system.   20. Watch your stress level.   21. Develop good sleep habits. Go to bed at the same time each night and develop rituals to help signal the end of the day. Avoid stimulants like caffeine, nicotine, and alcohol. They will keep you awake and make it more difficult to rest.   22. Get plenty of vitamin D.   23. Practice gratitude.   24. Consider using an antidepressant medication if you do not see improvement. Contact your physician for appropriate diagnosis and treatment.   25. Get your mind off your problems. If you over-think your problems, they begin to crowd out all the good happening in your life. Give issues the thought they deserve, but also allow yourself time to have fun. Read a book, pursue a hobby, or engage in a favorite activity.  26. Watch for warning signs of all-or-nothing thinking. Look for thoughts like  I always perform poorly on important projects at work,  or  I will never get over this break-up,  or  Now  "that the first night of my vacation went poorly, the whole trip is ruined.  Such thoughts can lead to generalizing one negative experience to other situations or the same situation in the future. For depressive thinkers, their whole world can crumble like a house of cards when one thing goes wrong.  27. Think about seeking cognitive therapy. Someone with a depressive mindset may not meet the criteria for clinical depression but can likely benefit from cognitive therapy to help brenner against depressive thoughts. Therapists using this technique teach their clients how to identify their particular depressive thoughts and then provide methods to challenge, negate, or fight against them. It really is possible to boost your mood by changing the way you think.  > As a teach-back Abiola was asked to choose two or three items from the list above to implement today. Abiola stated (picked), \"base my mood on my positive character traits rather than weight, size, or general appearance and to practice gratitude.\"  BETTER DAYS ARE AHEAD  > Abiola learned that for someone battling depression, life can feel like an endless storm, a bottomless pit, and a night that never ends. The good news is that depression is treatable. Find good, solid support for your fight against depression and find people who also support your recovery. Hope is knowing better days are ahead. Even during the darkest night, we know morning is coming.  > On a 0-10 Likert Scale (0=lowest confidence & 10=most confidence), Abiola was asked to rate her confidence with using the skills learned on \"Understanding and Overcoming Depression\" to help her strengthen her recovery both with MH and RENEE. Abiola stated her rating was a \"10\".   Assessment: Prior to leaving group Abiola was asked if she had any thoughts or ideations of self-harm or if she had any suicidal thoughts. Abiola denies any suicidal thoughts, plans, or intent today. Abiola also denies any " thoughts or behaviors of self-harm today.  Plan: Abiola will take the two items she selected for reducing depression and will implement them into her daily life as of today. Abiola commits to utilizing her safety plan if or when needed.

## 2023-06-21 ENCOUNTER — HOSPITAL ENCOUNTER (OUTPATIENT)
Dept: BEHAVIORAL HEALTH | Facility: CLINIC | Age: 58
Discharge: HOME | End: 2023-06-21
Attending: FAMILY MEDICINE
Payer: COMMERCIAL

## 2023-06-21 PROCEDURE — H2035 A/D TX PROGRAM, PER HOUR: HCPCS | Mod: HQ

## 2023-06-21 NOTE — GROUP NOTE
Group Therapy Documentation    PATIENT'S NAME: Abiola Matute  MRN:   [id number]  :   1965  ACCT. NUMBER: [id number]  DATE OF SERVICE: 23  START TIME: 12:00 PM  END TIME:  2:00 PM  FACILITATOR(S): Onelia Singh LADC  TOPIC: BEH Group Therapy  Number of patients attending the group:  5  Group Length:  2 Hours    Group Therapy Type: Addiction, Life skill(s), Psychoeducation, Psychotherapeutic, and Skills/Education    Summary of Group / Topics Discussed:    Self-Care Activities and Psychoeducation/Skills: Self-Care  This topic will give a general overview of self-care: physical, environmental, emotional, social and spiritual. It will explore importance of self-care and the impact on recovery.     Objective(s):   Patients will identify the characteristics of self-care.  Patients will be able to identify in each area what they do well and what needs improvement.  Patients will verbalize barriers in preventing self-care.     Structure (modalities, homework, worksheets, etc):   Provide psychoeducation about self-care, identifying what self-care is and barriers preventing self-care.  Facilitate group discussion about each patient's positive and negative self-care and barriers of prevention.  Complete a worksheet on self-care assessment. Choose from self-care tips which work best for them.    Expected therapeutic outcome(s):   Patient will:  Identify areas that need improvement and barriers associated in preventing self-care.   Improve self-care in areas needed.    Therapeutic outcome(s) measured by:   Completion of self-care assessment worksheet and discussion of area(S) of improvement        Group Attendance:  Attended group session    Patient's response to the group topic/interactions:  cooperative with task, discussed personal experience with topic, gave appropriate feedback to peers and listened actively    Patient appeared to be Actively participating, Attentive and Engaged.        Client specific details:     .Client checked in feeling grateful and reported if they could be any animal they would be aramis pool, because protective and don't poke the bear as she will defend her family. Daily meditation reading from Each Day a New Beginning. Discussed being present, listening VS hearing and communication.    Client s completed self-care assessment and described self-care as feeling better physically. Client identified physical self-care they do well- attending medical appointments, one they do poorly or needs improvement- eating healthy food and barriers that prevent themselves from self-care - cost of food is expensive, the shopping and prep.    .Attestation:   Jonathan Juares MD - Medical Director - Provides oversight and supervision of care.

## 2023-06-26 ENCOUNTER — HOSPITAL ENCOUNTER (OUTPATIENT)
Dept: BEHAVIORAL HEALTH | Facility: CLINIC | Age: 58
Discharge: HOME | End: 2023-06-26
Attending: FAMILY MEDICINE
Payer: COMMERCIAL

## 2023-06-26 PROCEDURE — H2035 A/D TX PROGRAM, PER HOUR: HCPCS | Mod: HQ

## 2023-06-26 NOTE — GROUP NOTE
"Group Therapy Documentation    PATIENT'S NAME: Abiola Matute  MRN:   [id number]  :   1965  ACCT. NUMBER: [id number]  DATE OF SERVICE: 23  START TIME: 12:00 PM  END TIME:  2:00 PM  FACILITATOR(S): Judi Dodd LADC  TOPIC: BEH Group Therapy  Number of patients attending the group: 5  Group Length:  2 Hours    Group Therapy Type: Addiction and Psychoeducation    Summary of Group / Topics Discussed:    Balanced Lifestyle , Boundaries, Choices in Recovery, Self-Esteem/Self [place], and Stress Management      Today in group clients discussed logistical questions, briefly checked in and also expressed how much they like group and building support with each other.  We discussed difficulties of sobriety and successes.  One client presented his \"what my relapse would look like\" assignment, and got feedback.  We discussed \"core beliefs\" below.    Attestation: Chris Juares MD - Medical Director - Provides oversight and supervision of care.         Summary of Group / Topics Discussed:     Topic: Core beliefs  This topic will discuss core beliefs, as a lens through which we see life and how that affects our choices    Objective(s):   -Client will gain insight into benefits associated with awareness of core beliefs  -Client will identify  core beliefs    Structure:   -Facilitate group discussion around each patient s answers to questions  -Facilitate group discussion on what consequences are of harmful core beliefs     Expected therapeutic outcomes:    -Gain insight into what your core beliefs are  -Understanding how awareness of association of use can be helpful to choosing alternatives/more helpful choices     Therapeutic outcome(s) measured by:   -Patient s ability to complete assignment and discuss facts of core beliefs  -patients ability to list  at least 2 of their core beliefs that are harmful  -patients ability to parricpate in discussion of core beliefs        Group Attendance:  Attended group " "session  Patient's response to the group topic/interactions:  cooperative with task, discussed personal experience with topic, and listened actively  Patient appeared to be Actively participating.        Client specific details:   [place]ia arrived on time and also gave feedback to each client today.  She said she is building trust and confidence in being a part of a group. She said it is so helpful to be attending.  Today's session focussed on check in of first 3 dimensions, dim 3 core beliefs and dim 5 warning signs and triggers.   Virginia said the discussion on core beliefs is something she will spend some time on, as the awareness is helpful.  I told her we will follow up on Wednesday with more that can be helpful when we notice harmful core beliefs.    Dim1: ASHISH: no change reported  Dim2: medical issues or appts: no  Dim 3: \"no thoughts of self harm or concerns\"  on likert scale 1-low and 10 -high:   Anxiety:  1 stress: 1 depression 0  issues to discuss and Helpful coping: learning about self, anxiety and stress    P)Notice core beliefs and write down, discuss Wed.  Attend MH skills with Darinel Au Tuesday    Attestation:   Jonathan Juares MD - Medical Director - Provides oversight and supervision of care.     Judi Dodd, MS, LADC, LPC        "

## 2023-06-26 NOTE — PROGRESS NOTES
"[facility] Weekly Treatment Plan Review      Attestation:   Jonathan Juares MD - Medical Director - Provides oversight and supervision of care.       Date Span:Monday: 23 through 23      Date     Group Therapy 2  2 2 hours 0 hours 0 hours       Individual Therapy                 Family Therapy                 Psychoeducation                 Other (Specify)                  Client has no absences this week    Client has one excused absence this week , due to medical appts.     Program Running Totals: (No Phase 1 IOP due to this program being only OP level of care)  Total # of Phase 2 OP Group Sessions:12 for 24  Total # of Phase 3 OP Recovery Management Group Sessions: None    Total # of 1:1 Sessions:  1 hour BALWINDER   1 hour                                       Darinel                     Weekly Treatment Plan Review     Treatment Plan initiated on: .    Dimension1: Acute Intoxication/Withdrawal Potential -   Previous Dimension Ratin  Current Dimension Ratin  Date of Last Use 22  Any reports of withdrawal symptoms - No      Dimension 2: Biomedical Conditions & Complications -   Previous Dimension Ratin  Current Dimension Ratin  Medical Concerns:\"no\"  wk of  She saw Jazmin Barrios at Urgent care for skin issues.  She also said she met with her GI practioner  She reports having liver cancer and Crohn's disease.  \"right now I am doing pretty well  Outside medical appointments this week (list provider and reason for visit):  she saw Jazmin Meng PA-C for a flare up of her psoriasis likely related to her other medical issues, per client     Current Medications & Medication Changes:  Current Outpatient Medications   Medication     ammonium lactate (AMLACTIN) 12 % external cream     furosemide (LASIX) 40 MG tablet     inFLIXimab (REMICADE) 100 MG injection     Multiple Vitamins-Minerals " (MULTIVITAMIN & MINERAL PO)     spironolactone (ALDACTONE) 100 MG tablet     triamcinolone (KENALOG) 0.1 % external cream     No current facility-administered medications for this encounter.     Facility-Administered Medications Ordered in Other Encounters   Medication     BREEZA Lemon-Lime flavored oral liquid for Neutral Abdominal/Pelvic Imaging 1,000 mL     hyoscyamine (LEVSIN/SL) sublingual tablet 125 mcg     Medication Prescriber:  Linda Macdonald  Taking meds as prescribed? Yes  Medication side effects or concerns:  no        Dimension 3: Emotional/Behavioral Conditions & Complications -   Previous Dimension Ratin  Current Dimension Ratin  PHQ2:       2023     1:34 PM 2023    10:00 AM 3/28/2023    11:21 AM 3/26/2023     1:45 PM 2023     9:35 AM 1/3/2023     2:46 PM 2022    11:02 AM   PHQ-2 (  [employer])   Q1: Little interest or pleasure in doing things 0 0 0 0 0 0 0   Q2: Feeling down, depressed or hopeless 0 0 0 0 1 0 0   PHQ-2 Score 0 0 0 0 1 0 0   Q1: Little interest or pleasure in doing things   Not at all  Not at all Not at all Not at all   Q2: Feeling down, depressed or hopeless   Not at all  Several days Not at all Not at all   PHQ-2 Score   0  1 0 0      GAD2:       2023     9:54 AM 2023    10:00 AM   TAINA-2   Feeling nervous, anxious, or on edge 1 1   Not being able to stop or control worrying 1 0   TAINA-2 Total Score 2 1     PROMIS 10-Global Health (all questions and answers displayed):       2023     9:58 AM 2023     7:32 PM 2023    10:00 AM   PROMIS 10   In general, would you say your health is: Good Good    In general, would you say your quality of life is: Good Good    In general, how would you rate your physical health? Fair Fair    In general, how would you rate your mental health, including your mood and your ability to think? Good Good    In general, how would you rate your satisfaction with your social activities and relationships? Good  Good    In general, please rate how well you carry out your usual social activities and roles Good Good    To what extent are you able to carry out your everyday physical activities such as walking, climbing stairs, carrying groceries, or moving a chair? Completely Completely    In the past 7 days, how often have you been bothered by emotional problems such as feeling anxious, depressed, or irritable? Sometimes Rarely    In the past 7 days, how would you rate your fatigue on average? Mild Mild    In the past 7 days, how would you rate your pain on average, where 0 means no pain, and 10 means worst imaginable pain? 3 3    In general, would you say your health is: 3 3 3   In general, would you say your quality of life is: 3 3 4   In general, how would you rate your physical health? 2 2 3   In general, how would you rate your mental health, including your mood and your ability to think? 3 3 4   In general, how would you rate your satisfaction with your social activities and relationships? 3 3 5   In general, please rate how well you carry out your usual social activities and roles. (This includes activities at home, at work and in your community, and responsibilities as a parent, child, spouse, employee, friend, etc.) 3 3 5   To what extent are you able to carry out your everyday physical activities such as walking, climbing stairs, carrying groceries, or moving a chair? 5 5 4   In the past 7 days, how often have you been bothered by emotional problems such as feeling anxious, depressed, or irritable? 3 2 2   In the past 7 days, how would you rate your fatigue on average? 2 2 2   In the past 7 days, how would you rate your pain on average, where 0 means no pain, and 10 means worst imaginable pain? 3 3 4   Global Mental Health Score 12 13 17   Global Physical Health Score 15 15 14   PROMIS TOTAL - SUBSCORES 27 28 31     Mental health diagnosis none  Date of last SIB:  never  Date of  last SI:  never  Date of last HI:  "never  Behavioral Targets:  see tx plan  Risk factors:  Client dealing with liver disease/cancer and Crohns, client needs a new liver,   Protective factors:  spirituality, forward/future oriented thinking, safe and stable environment, regular physical activity, living with other people and structured day  Current MH Assignments:  see tx plan    Narrative: wk of 6/26  Attended MH skills group with Darinel Au.    She participated in psychoeducation on Core beliefsThis topic will discuss core beliefs, as a lens through which we see life and how that affects our choices.  Virginia said the discussion on core beliefs is something she will spend some time on, as the awareness is helpful.  I told her we will follow up on Wednesday with more that can be helpful when we notice harmful core beliefs.   \"no thoughts of self harm or concerns\"  on likert scale 1-low and 10 -high:   Anxiety:  1 stress: 1 depression 0  issues to discuss and Helpful coping: learning about self, anxiety and stress  wk of 6/19 \"no thoughts of self harm or no concerns\".  She reports on likert scale 1-low and 10 -high:   Anxiety:  1  stress: 1 depression 0  issues to discuss and Helpful coping: getting to know people better in group, hearing other's helpful coping.  Attended MH skills group with Darinel Au.  We discussed anticipating some situations and \"gearing up\" by practicing deep breathing. I reminded client of previous discussions where beliefs often guide how we feel about certain situations, but that we can reframe them, or look at them for the particular situation.      Wk of 6/12  I am feeling much better and less anxiety about group attendance as I continue to attend and get to know others. \"no thoughts of self harm or concerns\". On likert scale 1-low and 10 -high:   Anxiety:  1  stress: 1 depression 1  issues to discuss and Helpful coping:  Breathing, getting to others in group  Wk of 6/6 \"no thoughts of self harm\"  Attended  " skills group with Darinel Malonebert  Week of 23 :Client reports feeling anxious due to first treatment experience, not knowing what to expect, her  Mother's declining health-needing a placement for higher level of care, spouse recently lost his job-need to obtain COBRA insurance and unable to care for grandchildren because of attending treatment. Client was saw Traci Aguilar and has not seen them in about 6 weeks. To follow up with primary counselor re: seeing psychotherapist or return to [facility].  Client rates the following on a scale of 1 (low) to 10 (high):  Anxiety- 5  Depression -1  Stress- 5 as a result of above concerns.   Client participated in psychotherapy/education on self-care, introduction to relaxation meditation-tensing and relaxing muscles, and sleep hygiene.  Current Mental Health symptoms include: . Active interventions to stabilize mental health symptoms this week :   At SI her PHQ-2 score was 0 and his TAINA-2 score was 1.  PROMISE 10 was  31:  She reports that at this time she is dealing with her mother's living situation and health issues. She reports that her father has passed away and that she is estranged from 1 sister.           Dimension 4: Treatment Acceptance / Resistance -   Previous Dimension Ratin  Current Dimension Ratin  RENEE Diagnosis:  Alcohol Use Disorder   303.90 (F10.20) Severe In a controlled environment  Commitment to tx process/Stage of change- contemplation  R[facility] assignments - see tx plan      Narrative - wk of   sober support motivation:  2, but I think motivation will grow as I keep settling in and I am motivated 10/10 for sobreity.    Wk of :  Client reports motivation for sobriety is 10 (high) Motivation for attending community support 5 (on scale 1 low-10 high).   Virginia expressed to her group how helpful they are to her and how much she appreciates all that members shared about their stories.  She said she gains such helpful insight for  "self   Week of  Virginia discussed that she has been late a couple times, but realized with 2 other members gone, how this speaks to the importance of being on time and valuing and respecting others time, too.   She will also miss tomorrows group due to a long awaited medical appt. Dim 4: on likert scale 1-low and 10 -high:  Motivation for sobriety:  10  motivation for sober support attendance: not doing yet  Wk of   Virginia also said she finds motivation for sobriety in her family and friends, \"but really deep down need to do this for myself\"  Told counselor and group how she didn't think she would get much out of tx and already is getting so much and is grateful for older adult group   Week of 23: Client reports motivation for sobriety is 10 (high) Motivation for attending community support 5 (on scale 1 low-10 high).  The patient is motivated, to explore treatment.  She said \"of course I am partly here to fulfill requirements for liver transplant, but I want to learn healthy coping for lifelong sobriety.      Dimension 5: Relapse / Continued Problem Potential -   Previous Dimension Ratin  Current Dimension Ratin  Relapses this week - None  Urges to use - None  UA results - No results found for this or any previous visit (from the past 168 hour(s)).  Using ReSet Clem: N/A    Narrative- wk of   Dim 5: cravings, warning signs, triggers: none.  Wed reading was focused blame.  It said blaming is hiding, that when we blame others we try to hide our character defects.  We discussed the meaning of character defects. \"I was in big denial for a long time, it took being told I would need a new liver for me to really realize what drinking was doing. \"it hit me like a ton of bricks when I finally really heard it\"  wk of   She participated in  Psychoeducation/Skills: Self-Care.  Client s completed self-care assessment and described self-care as feeling better physically. Client identified physical " "self-care they do well- attending medical appointments, one they do poorly or needs improvement- eating healthy food and barriers that prevent themselves from self-care - cost of food is expensive, the shopping and prep.  This topic will give a general overview of self-care: physical, environmental, emotional, social and spiritual. It will explore importance of self-care and the impact on recovery.   Wk of . Dim 5: warning signs, triggers and cravings: none   helpful coping: attending group. Examples of benefits of drinking or drug use, or other behaviors Physical, Social mental or emotional: confidence, escape from reality, \"friends\"     Main drawbacks connected to these: not real friends that only want to drink together.  Headaches and slurred speech.  Remoarse  Benefits connected to abstinence from addictive behaviors:more able to do me and be me, I enjoy trud friends, I remember things better and will have better memories      Drawbacks you see connected with abstinence from these:  Wk  she participated in the daily reading and said she liked it.  Virginia also gave meaningful feedback to client who discussed use episode: \"it could be anyone of us\"  She said this is important and she is learning from others sharing.  Week of 23: Client denies riggers or warning signs this past week. She said she liked the saying \"it is just as easy to create good habits as bad ones\"   Patient reports family members and her  are concerned about their substance use.  Patient reports their recovery goals are \"abstinence forever.\"  She has had no previous detoxes or treatments and needs to gain insight into healthy coping for relapse prevention.      Dimension 6: Recovery Environment -   Previous Dimension Ratin  Current Dimension Ratin  Family Involvement - not at this time  Summarize attendance at family groups and family sessions - NA  Family supportive of treatment?  Yes    Community support group " "attendance - no  Recreational activities - some camping, cooking and boating    Narrative -  Wk of 6/26. She said sober support motivation is  2, but I think motivation will grow as I keep settling in  I talked with clients about taking responsibility for our actions, so we can be empowered and own our life and also change if we need to. We discussed resources including AA, SMART Recovery.  I encouraged clients to start exploring meetings.  Wk of 6/19.  Virginia reports with having group 3x per week, it is a good deal of support, \" I will continue to learn about peer support\"  wk of 6/12client discussed resources sober support, including AA, Liver Transplant support and [place] recovery connection  wk of 6/6  goals for the wknd: look into volunteering at [facility] for writing card.  \"no community support attendance at this time\"   Client is not currently attending community sup[ort and does not have a sponsor. Client's supportive people are her family. Client shared she has friends and has not talked with many about her current situation, because \"I am a private person,\" (treatment and needing transplant).  Patient reports they are involved in community of holden activities    They reports spirituality impacts recovery in the following ways:  \"There was some changes as far as .  We do belong to a [employer] but it's been a number of years since we've been.\"  Patient believes her spirituality and sobriety are connected. Patient reported having 3 children, all adults.  Patient has 2 grandchildren.    She lives with her spouse and son. recreational/leisure activities: camping, cooking, boating, playing board games and card games.  Patient is currently disabled due to her Crohn's Disease.  Patient reports their income is obtained through SSDI disability; spouse.  Patient does not identify finances as a current stressor.         Progress made on transition planning goals: just began tx    Justification for Continued Treatment at this " Level of Care:  No previous tx, needs 6 months of sobriety to be eligible for a transplant, needs to learn long term relapse prevention, she reports finding that attending group is helpful for her and she is learning.She said she did not think she would get much out of this but is getting so much support and understanding of healthy coping.  She is not attending community support, at this time.    Treatment coordination activities this week: None  Need for peer recovery support referral? No    Discharge Planning: Not at this time        Has vulnerable adult status change? No    Interdisciplinary Clinical Supervision including: no    Are Treatment Plan goals/objectives effective? Yes  *If no, list changes to treatment plan:    Are the current goals meeting client's needs? Yes  *If no, list the changes to treatment plan.  Plan:  Continue per Master Treatment Plan    *Client agrees with any changes to the treatment plan: Yes  *Client received copy of changes: Yes  *Client is aware of right to access a treatment plan review: Yes     Staff Members Contributing To Weekly Review:    Judi Dodd, MS, LADC, LPC  Lead Counselor Adult [facility] IOP/OP Programs Darinel Au, DOUG, LMFT, LADC, CGTP-[place] [facility] Psych[facility]

## 2023-06-27 ENCOUNTER — HOSPITAL ENCOUNTER (OUTPATIENT)
Dept: BEHAVIORAL HEALTH | Facility: CLINIC | Age: 58
Discharge: HOME | End: 2023-06-27
Attending: FAMILY MEDICINE
Payer: COMMERCIAL

## 2023-06-27 PROCEDURE — H2035 A/D TX PROGRAM, PER HOUR: HCPCS | Mod: HQ

## 2023-06-28 ENCOUNTER — HOSPITAL ENCOUNTER (OUTPATIENT)
Dept: BEHAVIORAL HEALTH | Facility: CLINIC | Age: 58
Discharge: HOME | End: 2023-06-28
Attending: FAMILY MEDICINE
Payer: COMMERCIAL

## 2023-06-28 PROCEDURE — H2035 A/D TX PROGRAM, PER HOUR: HCPCS | Mod: HQ

## 2023-06-28 NOTE — GROUP NOTE
"Attestation: Chris Juares MD - Medical Director - Provides oversight and supervision of care.    Group Therapy Documentation    PATIENT'S NAME: Abiola Matute  MRN:   [id number]  :   1965  ACCT. NUMBER: [id number]  DATE OF SERVICE: 23  START TIME: 12:00 PM  END TIME:  2:00 PM  FACILITATOR(S): Darinel Au LMFT, [facility]  TOPIC: BEH Group Therapy  Number of patients attending the group:  3  Group Length:  2 Hours    Group Therapy Type: Addiction, Psychoeducation, Psychotherapeutic, and Skills/Education    Summary of Group / Topics Discussed:    Co-occurring Illness, Grief & Loss, and Trauma Informed Care.    Group Attendance:  Abiola attended group session.    Client's response to the group topic/interactions:  Abiola was cooperative with task, discussed personal experience with topic, expressed readiness to alter behaviors, expressed understanding of topic, and listened actively throughout today's group session.     Abiola appeared to be actively participating, appeared attentive and appeared engaged. Abiola was insightful and was a good contributor in group today.      Client specific details:  On a 0-10 Likert Scale (0=No issues/symptoms & 10=worst issues/symptoms), Abiola checked-in with rating her depression as a \"0\", anxiety as a \"0\", and rated her cravings as a \"0\". On a different 0-10 Likert Scale (0=low motivation and attendance & 10=highest level of motivation and attendance), Abiola rated her motivation for sobriety as a \"10+\", and lastly Abiola rated her attendance over this past week as a \"10\".  Overall Abiola reported feeling \"excitement\" today. Abiola reported being grateful for \"my  and family\". Abiola was asked what strength she has that she can use to strengthen her recovery, in which Abiola stated, \"hopefulness\". Today's group session focused on Dimension(s) 3 & 5. Abiola showed progress by being able to teach-back the skills she learned during today's " "group session. Please see additional details below for further specific details on group topic matter.  Abiola learned more in-depth information and gained greater insight into \"Understanding and Navigating the Pain of Grief and Loss with a Substance Use Disorder\".  UNDERSTANDING AND NAVIGATING THE PAIN OF GRIEF AND LOSS WITH A SUBSTANCE USE DISORDER  >  It is natural and normal to mourn the loss of anything that is morally good or even morally neutral. However, destructive, or inappropriate grief can lead to serious depression. We grieve for that which we have lost because we have become attached to certain people, places, ideas, and things. The extent of the grief is determined by the degree of attachment we had, whether appropriate or otherwise.  (Silvano and Suhail, 1999)  STAGES OF GRIEF   > Abiola learned that grieving is a process. It is an emotional journey of learning to cope with loss, and it can last a long time. The normal grieving process allows us to let go and continue moving on in a healthy way.   > Abiola learned that people often go through a predictable cycle and typical stages when they experience loss. Keep in mind, these stages are not always processed in order, and you can revisit stages several times. These stages of grief include:   Shock and denial: This stage often includes confusion and fear. Many people express their surprise or disbelief.   Anger: In this stage, it is common to feel anxious, irritated, or even enraged.   Depression and withdrawal: Feeling helpless or lacking energy is common during this stage. It may be accompanied by sadness, tears, and despair.   Bargaining and searching: This is the stage when it is typical for someone to search for meaning in the situation or try to make a deal with God or the universe.   Acceptance: In this stage, the person begins to look ahead again and make plans. A new normal may begin to materialize.  > As a teach-back, Abiola was asked " "what recent losses has she had which can include lost relationships, jobs, and career. Also list deaths, moves, [facility] or [facility] time, divorce, etc. Abiola stated, \"the main two for me is a job loss that I was at for over 25 years and some loss of physical health. I guess I also lost alcohol as a coping tool to numb things.\"  POTENTIAL CHALLENGES  > Abiola learned that as people navigate the grief process, there may be times they seem very disorganized and have trouble concentrating. They may have some difficulty remembering things and find even day-to-day living a challenge. Depression and a general sense of being tired are common, as are losing weight, trouble sleeping, and feeling guilty. This can last for weeks or months. Although these stages are predictable, they are not linear. Everyone responds to grief differently and may navigate them in a different order. Several factors may influence a person's reaction to loss and affect the severity and duration of each stage, including:     Mental state and stability     Support system     Nature of the relationship     Beliefs and past experiences     Circumstances of the death or loss  COMPLICATED GRIEF  > Abiola learned that instead of acceptance, some fall into depression and hopelessness, which can develop into a more serious problem called complicated, or destructive, grief. Many symptoms of complicated grief are similar to the natural process of grief, but instead of gradually subsiding, they persist for more than six months, and may even increase. Many describe feeling  stuck,  with the inability to pull themselves out of a heightened state of mourning. Signs and symptoms of complicated grief can include:     Persistent, painful yearning for loved one   Constant thoughts and images of loved one   Desperate loneliness and helplessness     Unrelenting anger and bitterness   Heavy drinking or substance abuse   Total loss/absence of meaning and purpose " "    Severely disrupted sleep   Inability to function normally   Inability to trust others     Isolation   Wanting to die   Continued denial of the death   > As a teach-back, Abiola was asked to consider a recent loss in her life. Abiola was then asked what were some of her emotions and behaviors. Abiola stated, \"previously it was low motivation, financial insecurity and increase in alcohol use.\"  REACHING OUT  > Abiola learned about the different people and professionals that she could reach out to in order to talk about her grief and loss experiences.  > As a teach-back, Abiola was asked who she could reach out to and talk to about her recent grief and loss experiences. Abiola stated, \"my spouse.\"   RISK FACTORS  > Abiola learned that while researchers don't know specifically what causes complicated grief, they have identified some factors that may increase the risk of it developing. These factors include:     Loss was perceived as preventable   Loss was traumatic, sudden, premature, or a suicide     History of depression, anxiety, or other types of emotional instability   The relationship had unresolved issues or conflict     Significant emotional dependence on who or what was lost   Lack of a support system   STRATEGIES FOR MANAGING GRIEF  > Abiola learned about the following 11 strategies for managing grief.    Allow yourself to feel the pain. Grieving is a painful, but necessary, process. You need to work through the difficult emotions and allow yourself to heal.    Be patient with yourself. The process takes time. Your recovery may look very different than someone else's. You need to work through things at your own pace, and on your own terms.    Talk things out. Find a trusted, compassionate friend or family member who is a good listener. Get together with them consistently and allow yourself to talk, cry, and express your emotions.    Avoid unnecessary changes. Wait for a few months before adding " additional stressors to your life, such as moving or changing jobs. Maintaining some normalcy will help give you a sense of security.    Take care of your body. Eat healthy, nutritious food, get plenty of exercise, and try to maintain a regular sleep schedule. Physical activity releases stress and helps you sleep and feel better.    Confront difficult emotions and feelings of regret. Forgive yourself, learn from it, and move on. Get help if you are feeling stuck.    Avoid self-medicating. Falling into the habit of dulling your pain with alcohol and drugs will only make things worse.    Plan ahead for special occasions. The first [place], birthday, anniversary, and other holidays may be difficult. Plan to do something special with others who understand and identify with your pain and loss. Create new memories.    Stay connected. Be intentional about spending time with people who can make you laugh and will let you cry. Pursue uplifting and supportive friendships. Perhaps join a social club or volunteer for a cause valued by your loved one.    Set new goals. Choose to look forward to the future. It may be as simple as planning a weekend away or learning a new skill. Do something proactive to help yourself find enjoyable activities.    Join a grief support group. It is therapeutic to be with people experiencing similar emotions and challenges.  BE PATIENT WITH YOURSELF  > Abiola learned about the importance of being patient with themselves. Abiola learned how grief is not linear, but is a process, and it can be a long journey. Learning to cope with loss strategically, staying connected to and talking with others, and being patient with yourself will help you through. Abiola learned that she can get better, heal from emotional wounds, handle responsibilities again, and return to a more normal pattern of life. No one processes at the same pace as another so it's important to take the time needed to work through  "these real and raw emotions.  >On a 0-10 Likert Scale (0=lowest confidence & 10=most confidence), Abiola was asked to rate her confidence with using the skills learned on \"Understanding and Navigating the Pain of Grief and Loss\" to help her strengthen her recovery both with MH and RENEE. Abiola stated her rating was a \"8\".   Plan: Abiola will reach out to one person that she identified earlier to talk with them about the grief that she experienced recently.   "

## 2023-06-28 NOTE — GROUP NOTE
"Group Therapy Documentation    PATIENT'S NAME: Abiola Matute  MRN:   [id number]  :   1965  ACCT. NUMBER: [id number]  DATE OF SERVICE: 23  START TIME: 12:00 PM  END TIME:  2:00 PM  FACILITATOR(S): Judi Dodd LADC  TOPIC: BEH Group Therapy  Number of patients attending the group:  4  Group Length:  2 Hours    Group Therapy Type: Addiction    Summary of Group / Topics Discussed:    Anger/Conflict Management , Balanced Lifestyle , Choices in Recovery, Journaling, and Self-Care Activities      Today in group clients read opening meditation reading from Keep it Simple, by Rosy. This reading was focused blame.  It said blaming is hiding, that when we blame others we try to hide our character defects.  We discussed the meaning of character defects.  I talked with clients about taking responsibility for our actions, so we can be empowered and own our life and also change if we need to. We discussed resources including AA, SMART Recovery.  I encouraged clients to start exploring meetings. One group member did his Recovery Care  plan and Goodbye letter, others gave feedback and wished him well.  It was quite an emotional day for group members as they said their goodbyes.  But they all wanted a phone list and we also discussed the boundaries around that.    Attestation: Chris Juares MD - Medical Director - Provides oversight and supervision of care.       Group Attendance:  Attended group session    Patient's response to the group topic/interactions:  cooperative with task    Patient appeared to be Attentive and Engaged.          Client specific details: Virginia said she has really been taking group seriously an d before starting didn't think she really would.  She said she is learning so much and is grateful for her group Today's session focussed on check in of  last 3 dimensions.  It also focused on dim 6 resources and sober support.    Client said of the reading:\"I was in big denial for a long " "time, it took being told I would need a new liver for me to really realize what drinking was doing. \"it hit me like a ton of bricks when I finally really heard it\"  Clients feedback to group member leaving: you are very powerful in what you say and in your actions.  Dim 4 on likert scale 1-low and 10 -high:  motivation for sobriety 10  Dim 5: cravings, warning signs, triggers: none  Dim 6 sober support motivation:  2, but I think motivation will grow as I keep settling in    P)Explore sober support.    Attestation:   Jonathan Juares MD - Medical Director - Provides oversight and supervision of care.     Judi Dodd, MS, LADC, LPC                                                     "

## 2023-07-03 ENCOUNTER — HOSPITAL ENCOUNTER (OUTPATIENT)
Dept: BEHAVIORAL HEALTH | Facility: CLINIC | Age: 58
Discharge: HOME | End: 2023-07-03
Attending: FAMILY MEDICINE
Payer: COMMERCIAL

## 2023-07-03 PROCEDURE — H2035 A/D TX PROGRAM, PER HOUR: HCPCS

## 2023-07-03 NOTE — PROGRESS NOTES
"Attestation: Chris Juares MD - Medical Director - Provides oversight and supervision of care.  Individual session  Start time 1:04  End time   2:00  Today there were only 2 clients, so in lieu of group client had individual session.  Virginia said she is so glad to be a part of this group and is learning so much in group and individual sessions  \"I am grateful every day\"  She said she is also grateful that her health is not suffering too much right now and she has had some fun times with family. My adult kids are caring and asked if I thought I would be OK going to [facility] [employer] [place], as there is drinking there.  She told them she appreciates the concern, but feels with them and little to no triggers, for quite some time, she didn't feel it was a problem.  Virginia said she had a wonderful time with her family and is not concerned about use over 4th of July holiday either, she has a good plan for spending time with family and focusing on good food.    We discussed \"The power of Solitude\" and how spending time alone with ourselves may not be easy or even desirable, it it is key to getting to know self.  I emphasized that studies tell us we need each other to survive and be happy, but also when we loose the ability to be alone with ourselves, sometimes our overstimulated nervous systems suffer from no place to rest and recharge.  She said this is good to discuss, as she may even consider doing it a bit more, as it \"really does recharge me\" She said she is pretty good about finding alone time and also uses journaling.  I asked client how this applies to recovery and treatment:She said she will often take a topic from group, or a question and journal about.  She said it really helps. I discussed the research on gratitude journals and expressed she has seemed like she has an \"attitude of gratitude\".  She said she feels blessed and hopes she does.  Client was able to discuss the benefits of alone time: like self renewal, " "escape from sensory overload, creativity and even spirituality.    I talked with her about the practie of anchoring attention to a single focus, like the breath, because the body and nervous system gear down from operating in relentlessly high stress states.  We discussed cortisol and adrenaline, fight or flight, and she said this is so interesting how we are such a society of not taking time to be alone or quiet and often the way people play things out on road keep the adrenaline and cortisoal up, because people are in such a hurry. She said she liked the discussion, handout and ideas.    I) MI, plan of structure, science of fight or flight, journaling, discussed research on gratitude journals.    P)spend time journaling on the discussion and handout \"The Power of Solitude\".  Follow plan for a sober 4th of July    Attestation:   Jonathan Juares MD - Medical Director - Provides oversight and supervision of care.     Judi Dodd, MS, LADC, LPC        "

## 2023-07-03 NOTE — PROGRESS NOTES
"[facility] Weekly Treatment Plan Review      Attestation:   Jonathan Juares MD - Medical Director - Provides oversight and supervision of care.       Date Span:Monday: 7/3/23 through 23      Date     Group Therapy 1 hour in lieu of group  holiday 2 hours 0 hours 0 hours       Individual Therapy                 Family Therapy                 Psychoeducation                 Other (Specify)                  Client has no absences this week    Client has one excused absence this week , due to medical appts.     Program Running Totals: (No Phase 1 IOP due to this program being only OP level of care)  Total # of Phase 2 OP Group Sessions:13 for 26  Total # of Phase 3 OP Recovery Management Group Sessions: None    Total # of 1:1 Sessions:  1 hour BALWINDER   1 hour 5/30  7/3                                    Darinel                     Weekly Treatment Plan Review     Treatment Plan initiated on: .    Dimension1: Acute Intoxication/Withdrawal Potential -   Previous Dimension Ratin  Current Dimension Ratin  Date of Last Use 22  Any reports of withdrawal symptoms - No      Dimension 2: Biomedical Conditions & Complications -   Previous Dimension Ratin  Current Dimension Ratin  Medical Concerns:wk of 7/3  I get an infusion 1x per month and usually by week 3, I feel more sore and more tired, but I am grateful for the infusions.  \"no concerns beyond ongoing liver cancer and Crohns\"  wk of  She saw Jazmin Barrios at Urgent care for skin issues.  She also said she met with her GI practioner  She reports having liver cancer and Crohn's disease.  \"right now I am doing pretty well  Outside medical appointments this week (list provider and reason for visit):  she saw Jazmin Meng PA-C for a flare up of her psoriasis likely related to her other medical issues, per client     Current Medications & Medication " Changes:  Current Outpatient Medications   Medication     ammonium lactate (AMLACTIN) 12 % external cream     furosemide (LASIX) 40 MG tablet     inFLIXimab (REMICADE) 100 MG injection     Multiple Vitamins-Minerals (MULTIVITAMIN & MINERAL PO)     spironolactone (ALDACTONE) 100 MG tablet     triamcinolone (KENALOG) 0.1 % external cream     No current facility-administered medications for this encounter.     Facility-Administered Medications Ordered in Other Encounters   Medication     BREEZA Lemon-Lime flavored oral liquid for Neutral Abdominal/Pelvic Imaging 1,000 mL     hyoscyamine (LEVSIN/SL) sublingual tablet 125 mcg     Medication Prescriber:  Linda Macdonald  Taking meds as prescribed? Yes  Medication side effects or concerns:  no        Dimension 3: Emotional/Behavioral Conditions & Complications -   Previous Dimension Ratin  Current Dimension Ratin  PHQ2:       2023     1:34 PM 2023    10:00 AM 3/28/2023    11:21 AM 3/26/2023     1:45 PM 2023     9:35 AM 1/3/2023     2:46 PM 2022    11:02 AM   PHQ-2 (  [employer])   Q1: Little interest or pleasure in doing things 0 0 0 0 0 0 0   Q2: Feeling down, depressed or hopeless 0 0 0 0 1 0 0   PHQ-2 Score 0 0 0 0 1 0 0   Q1: Little interest or pleasure in doing things   Not at all  Not at all Not at all Not at all   Q2: Feeling down, depressed or hopeless   Not at all  Several days Not at all Not at all   PHQ-2 Score   0  1 0 0      GAD2:       2023     9:54 AM 2023    10:00 AM   TAINA-2   Feeling nervous, anxious, or on edge 1 1   Not being able to stop or control worrying 1 0   TAINA-2 Total Score 2 1     PROMIS 10-Global Health (all questions and answers displayed):       2023     9:58 AM 2023     7:32 PM 2023    10:00 AM   PROMIS 10   In general, would you say your health is: Good Good    In general, would you say your quality of life is: Good Good    In general, how would you rate your physical health? Fair Fair     In general, how would you rate your mental health, including your mood and your ability to think? Good Good    In general, how would you rate your satisfaction with your social activities and relationships? Good Good    In general, please rate how well you carry out your usual social activities and roles Good Good    To what extent are you able to carry out your everyday physical activities such as walking, climbing stairs, carrying groceries, or moving a chair? Completely Completely    In the past 7 days, how often have you been bothered by emotional problems such as feeling anxious, depressed, or irritable? Sometimes Rarely    In the past 7 days, how would you rate your fatigue on average? Mild Mild    In the past 7 days, how would you rate your pain on average, where 0 means no pain, and 10 means worst imaginable pain? 3 3    In general, would you say your health is: 3 3 3   In general, would you say your quality of life is: 3 3 4   In general, how would you rate your physical health? 2 2 3   In general, how would you rate your mental health, including your mood and your ability to think? 3 3 4   In general, how would you rate your satisfaction with your social activities and relationships? 3 3 5   In general, please rate how well you carry out your usual social activities and roles. (This includes activities at home, at work and in your community, and responsibilities as a parent, child, spouse, employee, friend, etc.) 3 3 5   To what extent are you able to carry out your everyday physical activities such as walking, climbing stairs, carrying groceries, or moving a chair? 5 5 4   In the past 7 days, how often have you been bothered by emotional problems such as feeling anxious, depressed, or irritable? 3 2 2   In the past 7 days, how would you rate your fatigue on average? 2 2 2   In the past 7 days, how would you rate your pain on average, where 0 means no pain, and 10 means worst imaginable pain? 3 3 4  "  Global Mental Health Score 12 13 17   Global Physical Health Score 15 15 14   PROMIS TOTAL - SUBSCORES 27 28 31     Mental health diagnosis none  Date of last SIB:  never  Date of  last SI:  never  Date of last HI: never  Behavioral Targets:  see tx plan  Risk factors:  Client dealing with liver disease/cancer and Crohns, client needs a new liver,   Protective factors:  spirituality, forward/future oriented thinking, safe and stable environment, regular physical activity, living with other people and structured day  Current MH Assignments:  see tx plan    Narrative: wk of 7/3: Clients participated in 5 senses psychoeducation discussion, participated in guided practice and was able to name 5 parts, and said she feels it will be a helpful tool  \"no thoughts of self harm or concerns\".   I discussed the research on gratitude journals and expressed she has seemed like she has an \"attitude of gratitude\".  She said she feels blessed and hopes she does.  on likert scale 1-low and 10 -high:   Anxiety:  0  stress: 0 depression 0  issues to discuss and Helpful coping: I am learning to say no, and that boundaries are good    wk of 6/26  Attended MH skills group with Darinel Au.    She participated in psychoeducation on Core beliefsThis topic will discuss core beliefs, as a lens through which we see life and how that affects our choices.  Virginia said the discussion on core beliefs is something she will spend some time on, as the awareness is helpful.  I told her we will follow up on Wednesday with more that can be helpful when we notice harmful core beliefs.   \"no thoughts of self harm or concerns\"  on likert scale 1-low and 10 -high:   Anxiety:  1 stress: 1 depression 0  issues to discuss and Helpful coping: learning about self, anxiety and stress  wk of 6/19 \"no thoughts of self harm or no concerns\".  She reports on likert scale 1-low and 10 -high:   Anxiety:  1  stress: 1 depression 0  issues to discuss and Helpful " "coping: getting to know people better in group, hearing other's helpful coping.  Attended MH skills group with Darinel Angely.  We discussed anticipating some situations and \"gearing up\" by practicing deep breathing. I reminded client of previous discussions where beliefs often guide how we feel about certain situations, but that we can reframe them, or look at them for the particular situation.      Wk of 6/12  I am feeling much better and less anxiety about group attendance as I continue to attend and get to know others. \"no thoughts of self harm or concerns\". On likert scale 1-low and 10 -high:   Anxiety:  1  stress: 1 depression 1  issues to discuss and Helpful coping:  Breathing, getting to others in group  Wk of 6/6 \"no thoughts of self harm\"  Attended MH skills group with Darinel Au  Week of 5/31/23 :Client reports feeling anxious due to first treatment experience, not knowing what to expect, her  Mother's declining health-needing a placement for higher level of care, spouse recently lost his job-need to obtain COBRA insurance and unable to care for grandchildren because of attending treatment. Client was saw Traci Aguilar and has not seen them in about 6 weeks. To follow up with primary counselor re: seeing psychotherapist or return to [facility].  Client rates the following on a scale of 1 (low) to 10 (high):  Anxiety- 5  Depression -1  Stress- 5 as a result of above concerns.   Client participated in psychotherapy/education on self-care, introduction to relaxation meditation-tensing and relaxing muscles, and sleep hygiene.  Current Mental Health symptoms include: . Active interventions to stabilize mental health symptoms this week :   At SI her PHQ-2 score was 0 and his TAINA-2 score was 1.  PROMISE 10 was  31:  She reports that at this time she is dealing with her mother's living situation and health issues. She reports that her father has passed away and that she is estranged from 1 " "sister.           Dimension 4: Treatment Acceptance / Resistance -   Previous Dimension Ratin  Current Dimension Ratin  RENEE Diagnosis:  Alcohol Use Disorder   303.90 (F10.20) Severe In a controlled environment  Commitment to tx process/Stage of change- contemplation  RENEE assignments - see tx plan      Narrative - wk of 7/3  on likert scale 1-low and 10 -high:  motivation for sobriety 10.  Virginia said  session really stuck with her and she thought about a lot of things and is really understanding why attendance and building on the days and lessons is important.     wk of   sober support motivation:  2, but I think motivation will grow as I keep settling in and I am motivated 10/10 for sobreity.    Wk of :  Client reports motivation for sobriety is 10 (high) Motivation for attending community support 5 (on scale 1 low-10 high).   Virginia expressed to her group how helpful they are to her and how much she appreciates all that members shared about their stories.  She said she gains such helpful insight for self   Week of  Virginia discussed that she has been late a couple times, but realized with 2 other members gone, how this speaks to the importance of being on time and valuing and respecting others time, too.   She will also miss tomorrows group due to a long awaited medical appt. Dim 4: on likert scale 1-low and 10 -high:  Motivation for sobriety:  10  motivation for sober support attendance: not doing yet  Wk of   Virginia also said she finds motivation for sobriety in her family and friends, \"but really deep down need to do this for myself\"  Told counselor and group how she didn't think she would get much out of tx and already is getting so much and is grateful for older adult group   Week of 23: Client reports motivation for sobriety is 10 (high) Motivation for attending community support 5 (on scale 1 low-10 high).  The patient is motivated, to explore treatment.  She said \"of course I am " "partly here to fulfill requirements for liver transplant, but I want to learn healthy coping for lifelong sobriety.      Dimension 5: Relapse / Continued Problem Potential -   Previous Dimension Ratin  Current Dimension Ratin  Relapses this week - None  Urges to use - None  UA results - No results found for this or any previous visit (from the past 168 hour(s)).  Using ReSet Clem: N/A    Narrative- wk of   cravings, warning signs, triggers: none.  My adult kids are caring and asked if I thought I would be OK going to [employer] [place], as there is drinking there.  She told them she appreciates the concern, but feels with them and little to no triggers, for quite some time, she didn't feel it was a problem.  Virginia said she had a wonderful time with her family and is not concerned about use over  holiday either, she has a good plan for spending time with family and focusing on good food.   wk of   Dim 5: cravings, warning signs, triggers: none.  Wed reading was focused blame.  It said blaming is hiding, that when we blame others we try to hide our character defects.  We discussed the meaning of character defects. \"I was in big denial for a long time, it took being told I would need a new liver for me to really realize what drinking was doing. \"it hit me like a ton of bricks when I finally really heard it\"  wk of   She participated in  Psychoeducation/Skills: Self-Care.  Client s completed self-care assessment and described self-care as feeling better physically. Client identified physical self-care they do well- attending medical appointments, one they do poorly or needs improvement- eating healthy food and barriers that prevent themselves from self-care - cost of food is expensive, the shopping and prep.  This topic will give a general overview of self-care: physical, environmental, emotional, social and spiritual. It will explore importance of self-care and the impact on recovery.   Wk " "of . Dim 5: warning signs, triggers and cravings: none   helpful coping: attending group. Examples of benefits of drinking or drug use, or other behaviors Physical, Social mental or emotional: confidence, escape from reality, \"friends\"     Main drawbacks connected to these: not real friends that only want to drink together.  Headaches and slurred speech.  Remoarse  Benefits connected to abstinence from addictive behaviors:more able to do me and be me, I enjoy trud friends, I remember things better and will have better memories      Drawbacks you see connected with abstinence from these:  Wk  she participated in the daily reading and said she liked it.  Virginia also gave meaningful feedback to client who discussed use episode: \"it could be anyone of us\"  She said this is important and she is learning from others sharing.  Week of 23: Client denies riggers or warning signs this past week. She said she liked the saying \"it is just as easy to create good habits as bad ones\"   Patient reports family members and her  are concerned about their substance use.  Patient reports their recovery goals are \"abstinence forever.\"  She has had no previous detoxes or treatments and needs to gain insight into healthy coping for relapse prevention.      Dimension 6: Recovery Environment -   Previous Dimension Ratin  Current Dimension Ratin  Family Involvement - not at this time  Summarize attendance at family groups and family sessions - NA  Family supportive of treatment?  Yes    Community support group attendance - no  Recreational activities - some camping, cooking and boating    Narrative - wk of    We discussed \"The power of Solitude\" and how spending time alone with ourselves may not be easy or even desirable, it it is key to getting to know self.  I emphasized that studies tell us we need each other to survive and be happy, but also when we loose the ability to be alone with ourselves, sometimes " "our overstimulated nervous systems suffer from no place to rest and recharge.  She said this is good to discuss, as she may even consider doing it a bit more, as it \"really does recharge me\" She said she is pretty good about finding alone time and also uses journaling.  I asked client how this applies to recovery and treatment:She said she will often take a topic from group, or a question and journal about.  She said it really helps   Wk of 6/26. She said sober support motivation is  2, but I think motivation will grow as I keep settling in  I talked with clients about taking responsibility for our actions, so we can be empowered and own our life and also change if we need to. We discussed resources including AA, SMART Recovery.  I encouraged clients to start exploring meetings.  Wk of 6/19.  Virginia reports with having group 3x per week, it is a good deal of support, \" I will continue to learn about peer support\"  wk of 6/12client discussed resources sober support, including AA, Liver Transplant support and [place] recovery connection  wk of 6/6  goals for the wknd: look into volunteering at [facility] for writing card.  \"no community support attendance at this time\"   Client is not currently attending community sup[ort and does not have a sponsor. Client's supportive people are her family. Client shared she has friends and has not talked with many about her current situation, because \"I am a private person,\" (treatment and needing transplant).  Patient reports they are involved in community of holden activities    They reports spirituality impacts recovery in the following ways:  \"There was some changes as far as .  We do belong to a [employer] but it's been a number of years since we've been.\"  Patient believes her spirituality and sobriety are connected. Patient reported having 3 children, all adults.  Patient has 2 grandchildren.    She lives with her spouse and son. recreational/leisure activities: camping, cooking, " boating, playing board games and card games.  Patient is currently disabled due to her Crohn's Disease.  Patient reports their income is obtained through SSDI disability; spouse.  Patient does not identify finances as a current stressor.         Progress made on transition planning goals: just began tx    Justification for Continued Treatment at this Level of Care:  No previous tx, needs 6 months of sobriety to be eligible for a transplant, needs to learn long term relapse prevention, she reports finding that attending group is helpful for her and she is learning.She said she did not think she would get much out of this but is getting so much support and understanding of healthy coping.  She is not attending community support, at this time.  She is working on goals and assignments    Treatment coordination activities this week: None  Need for peer recovery support referral? No    Discharge Planning: Not at this time        Has vulnerable adult status change? No    Interdisciplinary Clinical Supervision including: no    Are Treatment Plan goals/objectives effective? Yes  *If no, list changes to treatment plan:    Are the current goals meeting client's needs? Yes  *If no, list the changes to treatment plan.  Plan:  Continue per Master Treatment Plan    *Client agrees with any changes to the treatment plan: Yes  *Client received copy of changes: Yes  *Client is aware of right to access a treatment plan review: Yes     Staff Members Contributing To Weekly Review:    Judi Dodd, MS, LADC, LPC  Lead Counselor Adult [facility] IOP/OP Programs Darinel Au, DD, LMFT, LADC, CGTP-[place] Licensed MICD Psychotherapist

## 2023-07-05 ENCOUNTER — HOSPITAL ENCOUNTER (OUTPATIENT)
Dept: BEHAVIORAL HEALTH | Facility: CLINIC | Age: 58
Discharge: HOME | End: 2023-07-05
Attending: FAMILY MEDICINE
Payer: COMMERCIAL

## 2023-07-05 PROCEDURE — H2035 A/D TX PROGRAM, PER HOUR: HCPCS | Mod: HQ

## 2023-07-05 NOTE — GROUP NOTE
"Group Therapy Documentation    PATIENT'S NAME: Abiola Matute  MRN:   [id number]  :   1965  ACCT. NUMBER: [id number]  DATE OF SERVICE: 23  START TIME: 12:00 PM  END TIME:  2:00 PM  FACILITATOR(S): Judi Dodd LADC  TOPIC: BEH Group Therapy  Number of patients attending the group:  3  Group Length:  2 Hours    Group Therapy Type: Addiction and Psychoeducation    Summary of Group / Topics Discussed:    Balanced Lifestyle , Boundaries, Emotional Regulation, Forgiveness, Grief & Loss, and Mindfulness      Attestation:   Jonathan Juares MD - Medical Director - Provides oversight and supervision of care.     Today Group discussed opening reading.  It was from Days of Healing, Days of Amelie and focused on the thought that recovery does not have to be overwhelming and on self sabotage  \"recovery does not require us to become totally new people overnight\"  I talked with clients about just starting somewhere, like entering treatment.  Maybe reading one meditation, talking to one friend, attending one meeting. We discussed patience and perserverence.  Group discussed wanting a group contact list and we made one.  We talked about the holiday and holiday weekend, what was fun, if there were any issues or triggers, and what they did that was good for self in recovery.   We discussed wkly check in's of all dimensions, for clients and whatever topics or feedback that came up during that.     I did psychoeducation below:    Psychoeducation/Skills \"Five senses\" for dealing with anxiety symptoms    This topic will address research that gives insight into how the practice of how Five Senses Mindfulness exercise can be helpful when feeling anxious, stress or upset     Objective(s):     -Patient will learn strategies to reduce anxiety and stress and increase overall health and wellness.   -Patient will identify 2 things research shows can be helped with the support the practice of \"Five Senses\"     Structure " "(modalities, homework, worksheets, etc)      -Facilitate group discussion on \"Five Senses\" as a mindfulness activity  -Provide psychoeducation on research supporting \"Five senses\" grounding   -Use teach-back techniques to ensure patients understanding.   -Patient will participate in Five senses practice  -Patient will receive handout on 5 senses     Expected therapeutic outcome(s):     Patient will:   -Patient will use healthy coping for anxiety /worry/upset and discuss as needed    Therapeutic outcome(s) measured by:      Patient's ability to teach-back the 5 steps of 5 senses  Practice/participate in 5 steps example            Group Attendance:  Attended group session    Patient's response to the group topic/interactions:  cooperative with task and discussed personal experience with topic    Patient appeared to be Actively participating, Attentive and Engaged.        Clients specifice information :  Virginia said Mondays session really stuck with her and she thought about a lot of things and is really understanding why attendance and building on the days and lessons is important.  Today's session focused on check in of all dimensions, dim 3, Five Senses and sim 4 motivation for tx attendance and sobriety.  Client said of the reading:  She said the reading \"hit home\" and she realizes she can get way ahead of herself in recovery or other things in life, but to return to the moments and one step  At a time allows for building and more success.  Clients participated in 5 senses discussion, participated in guided practice and was able to name 5 parts  And said she will definitely practice this this weekend.    Dim1: ASHISH: no change reported  Dim2: medical issues or appts: I get an infusion 1x per month and usually by week 3, I feel more sore and more tired, but I am grateful for the infusions.  Dim 3: \"no thoughts of self harm or concerns\"  on likert scale 1-low and 10 -high:   Anxiety:  0  stress: 0 depression 0  issues to " discuss and Helpful coping: I am learning to say no, and that boundaries are good  Dim 4 on likert scale 1-low and 10 -high:  motivation for sobriety 10  Dim 5: cravings, warning signs, triggers: none  Dim 6 sober support motivation:  10/10, I will check out sober support as I move a bit further    P) report back on how Five Senses practoce went.    Attestation:   Jonathan Juares MD - Medical Director - Provides oversight and supervision of care.         Judi Dodd MS, [facility], LPC

## 2023-07-06 ENCOUNTER — OFFICE VISIT (OUTPATIENT)
Dept: DERMATOLOGY | Facility: CLINIC | Age: 58
End: 2023-07-06
Payer: COMMERCIAL

## 2023-07-06 ENCOUNTER — TELEPHONE (OUTPATIENT)
Dept: GASTROENTEROLOGY | Facility: CLINIC | Age: 58
End: 2023-07-06

## 2023-07-06 DIAGNOSIS — I87.2 VENOUS STASIS DERMATITIS OF LEFT LOWER EXTREMITY: Primary | ICD-10-CM

## 2023-07-06 PROCEDURE — 99214 OFFICE O/P EST MOD 30 MIN: CPT | Performed by: PHYSICIAN ASSISTANT

## 2023-07-06 RX ORDER — CLOBETASOL PROPIONATE 0.5 MG/G
CREAM TOPICAL
Qty: 60 G | Refills: 3 | Status: SHIPPED | OUTPATIENT
Start: 2023-07-06 | End: 2024-06-30

## 2023-07-06 ASSESSMENT — PAIN SCALES - GENERAL: PAINLEVEL: MILD PAIN (2)

## 2023-07-06 NOTE — PROGRESS NOTES
HPI:   Chief complaints: Abiola Matute is a pleasant 58 year old female who presents for evaluation of a rash on the left lower leg. A few weeks ago, she developed a very itchy and irritated rash on the left lower leg with a few spots on the left thigh and arms. She did have swelling in her lower leg prior to this and did get a inflectra infusion about 1.5 weeks prior to rash. She was treated for cellulitis with oral antibiotics which was helpful. She does have intermittent swelling in her left lower leg and has compression stockings on the way for this. She is also on a diuretic.       PHYSICAL EXAM:    LMP 05/31/2006   Skin exam performed as follows: Type 2 skin. Mood appropriate  Alert and Oriented X 3. Well developed, well nourished in no distress.  General appearance: Normal  Head including face: Normal  Eyes: conjunctiva and lids: Normal  Mouth: Lips, teeth, gums: Normal  Neck: Normal  Skin: Scalp and body hair: See below    Dermatitis around the left lower leg and heel; PIH along the shin    ASSESSMENT/PLAN:     1. Stasis dermatitis - largely resolved today. Possibly with component of psoriasis (? TNF alpha induced?) - stubborn patch on around the heel. Discussed treating swelling with compression and leg elevation   --Start clobetasol cream BID x 1-2 weeks PRN flares - discussed ok to use this on psoriasis spots as well          Follow-up: PRN  CC:   Scribed By: Susanna Palencia, MS, PA-C

## 2023-07-06 NOTE — LETTER
7/6/2023         RE: Abiola Matute  [address] [id number]        Dear Colleague,    Thank you for referring your patient, Abiola Matute, to the [facility]. Please see a copy of my visit note below.    HPI:   Chief complaints: Abiola Matute is a pleasant 58 year old female who presents for evaluation of a rash on the left lower leg. A few weeks ago, she developed a very itchy and irritated rash on the left lower leg with a few spots on the left thigh and arms. She did have swelling in her lower leg prior to this and did get a inflectra infusion about 1.5 weeks prior to rash. She was treated for cellulitis with oral antibiotics which was helpful. She does have intermittent swelling in her left lower leg and has compression stockings on the way for this. She is also on a diuretic.       PHYSICAL EXAM:    [facility] 05/31/2006   Skin exam performed as follows: Type 2 skin. Mood appropriate  Alert and Oriented X 3. Well developed, well nourished in no distress.  General appearance: Normal  Head including face: Normal  Eyes: conjunctiva and lids: Normal  Mouth: Lips, teeth, gums: Normal  Neck: Normal  Skin: Scalp and body hair: See below    Dermatitis around the left lower leg and heel; PIH along the shin    ASSESSMENT/PLAN:     1. Stasis dermatitis - largely resolved today. Possibly with component of psoriasis (? TNF alpha induced?) - stubborn patch on around the heel. Discussed treating swelling with compression and leg elevation   --Start clobetasol cream BID x 1-2 weeks PRN flares - discussed ok to use this on psoriasis spots as well          Follow-up: PRN  CC:   Scribed By: Susanna Palencia, MS, PALILY          Again, thank you for allowing me to participate in the care of your patient.        Sincerely,        Susanna Palencia PA-C

## 2023-07-06 NOTE — TELEPHONE ENCOUNTER
LVM & sent mychart // pt needs to reschedule appt on 9.8.23 with Dr. Cervantes to be on 9.15.23 at 9:30am (slot is on hold) // first attempt, AN 7.6.23

## 2023-07-07 ENCOUNTER — HOSPITAL ENCOUNTER (OUTPATIENT)
Dept: CT IMAGING | Facility: CLINIC | Age: 58
Discharge: HOME | End: 2023-07-07
Attending: RADIOLOGY
Payer: COMMERCIAL

## 2023-07-07 ENCOUNTER — LAB (OUTPATIENT)
Dept: LAB | Facility: CLINIC | Age: 58
End: 2023-07-07
Attending: RADIOLOGY
Payer: COMMERCIAL

## 2023-07-07 ENCOUNTER — HOSPITAL ENCOUNTER (OUTPATIENT)
Dept: MRI IMAGING | Facility: CLINIC | Age: 58
Discharge: HOME | End: 2023-07-07
Attending: RADIOLOGY
Payer: COMMERCIAL

## 2023-07-07 DIAGNOSIS — K70.31 ALCOHOLIC CIRRHOSIS OF LIVER WITH ASCITES (H): ICD-10-CM

## 2023-07-07 DIAGNOSIS — C22.0 HCC (HEPATOCELLULAR CARCINOMA) (H): ICD-10-CM

## 2023-07-07 DIAGNOSIS — K50.113 CROHN'S DISEASE OF LARGE INTESTINE WITH FISTULA (H): ICD-10-CM

## 2023-07-07 LAB
AFP SERPL-MCNC: 81 NG/ML
ALBUMIN SERPL BCG-MCNC: 3.4 G/DL (ref 3.5–5.2)
ALP SERPL-CCNC: 156 U/L (ref 35–104)
ALT SERPL W P-5'-P-CCNC: 33 U/L (ref 0–50)
ANION GAP SERPL CALCULATED.3IONS-SCNC: 6 MMOL/L (ref 7–15)
AST SERPL W P-5'-P-CCNC: 60 U/L (ref 0–45)
BASOPHILS # BLD AUTO: 0.1 10E3/UL (ref 0–0.2)
BASOPHILS NFR BLD AUTO: 1 %
BILIRUB DIRECT SERPL-MCNC: 0.41 MG/DL (ref 0–0.3)
BILIRUB SERPL-MCNC: 1.4 MG/DL
BUN SERPL-MCNC: 12.2 MG/DL (ref 8–23)
CALCIUM SERPL-MCNC: 8.8 MG/DL (ref 8.6–10)
CHLORIDE SERPL-SCNC: 104 MMOL/L (ref 98–107)
CREAT SERPL-MCNC: 0.7 MG/DL (ref 0.51–0.95)
CRP SERPL-MCNC: 4.22 MG/L
DEPRECATED HCO3 PLAS-SCNC: 23 MMOL/L (ref 22–29)
EOSINOPHIL # BLD AUTO: 1.8 10E3/UL (ref 0–0.7)
EOSINOPHIL NFR BLD AUTO: 38 %
ERYTHROCYTE [DISTWIDTH] IN BLOOD BY AUTOMATED COUNT: 14.7 % (ref 10–15)
ERYTHROCYTE [SEDIMENTATION RATE] IN BLOOD BY WESTERGREN METHOD: 36 MM/HR (ref 0–30)
GFR SERPL CREATININE-BSD FRML MDRD: >90 ML/MIN/1.73M2
GLUCOSE SERPL-MCNC: 93 MG/DL (ref 70–99)
HCT VFR BLD AUTO: 41.9 % (ref 35–47)
HGB BLD-MCNC: 14 G/DL (ref 11.7–15.7)
IMM GRANULOCYTES # BLD: 0 10E3/UL
IMM GRANULOCYTES NFR BLD: 0 %
INR PPP: 1.16 (ref 0.85–1.15)
LYMPHOCYTES # BLD AUTO: 0.9 10E3/UL (ref 0.8–5.3)
LYMPHOCYTES NFR BLD AUTO: 20 %
MCH RBC QN AUTO: 35.3 PG (ref 26.5–33)
MCHC RBC AUTO-ENTMCNC: 33.4 G/DL (ref 31.5–36.5)
MCV RBC AUTO: 106 FL (ref 78–100)
MONOCYTES # BLD AUTO: 0.4 10E3/UL (ref 0–1.3)
MONOCYTES NFR BLD AUTO: 8 %
NEUTROPHILS # BLD AUTO: 1.5 10E3/UL (ref 1.6–8.3)
NEUTROPHILS NFR BLD AUTO: 33 %
NRBC # BLD AUTO: 0 10E3/UL
NRBC BLD AUTO-RTO: 0 /100
PLATELET # BLD AUTO: 113 10E3/UL (ref 150–450)
POTASSIUM SERPL-SCNC: 4.1 MMOL/L (ref 3.4–5.3)
PROT SERPL-MCNC: 7.5 G/DL (ref 6.4–8.3)
RBC # BLD AUTO: 3.97 10E6/UL (ref 3.8–5.2)
SODIUM SERPL-SCNC: 133 MMOL/L (ref 136–145)
WBC # BLD AUTO: 4.7 10E3/UL (ref 4–11)

## 2023-07-07 PROCEDURE — A9585 GADOBUTROL INJECTION: HCPCS | Performed by: RADIOLOGY

## 2023-07-07 PROCEDURE — 85025 COMPLETE CBC W/AUTO DIFF WBC: CPT

## 2023-07-07 PROCEDURE — 82105 ALPHA-FETOPROTEIN SERUM: CPT

## 2023-07-07 PROCEDURE — 80053 COMPREHEN METABOLIC PANEL: CPT

## 2023-07-07 PROCEDURE — 80321 ALCOHOLS BIOMARKERS 1OR 2: CPT

## 2023-07-07 PROCEDURE — 86140 C-REACTIVE PROTEIN: CPT

## 2023-07-07 PROCEDURE — 85610 PROTHROMBIN TIME: CPT

## 2023-07-07 PROCEDURE — 36415 COLL VENOUS BLD VENIPUNCTURE: CPT

## 2023-07-07 PROCEDURE — 74183 MRI ABD W/O CNTR FLWD CNTR: CPT

## 2023-07-07 PROCEDURE — 85652 RBC SED RATE AUTOMATED: CPT

## 2023-07-07 PROCEDURE — 82248 BILIRUBIN DIRECT: CPT

## 2023-07-07 PROCEDURE — 255N000002 HC RX 255 OP 636: Performed by: RADIOLOGY

## 2023-07-07 PROCEDURE — 71250 CT THORAX DX C-: CPT

## 2023-07-07 RX ORDER — GADOBUTROL 604.72 MG/ML
8 INJECTION INTRAVENOUS ONCE
Status: COMPLETED | OUTPATIENT
Start: 2023-07-07 | End: 2023-07-07

## 2023-07-07 RX ADMIN — GADOBUTROL 8 ML: 604.72 INJECTION INTRAVENOUS at 09:37

## 2023-07-09 LAB
LABORATORY REPORT: NORMAL
PLPETH BLD-MCNC: <10 NG/ML
POPETH BLD-MCNC: <10 NG/ML

## 2023-07-10 ENCOUNTER — DOCUMENTATION ONLY (OUTPATIENT)
Dept: GASTROENTEROLOGY | Facility: CLINIC | Age: 58
End: 2023-07-10
Payer: COMMERCIAL

## 2023-07-10 ENCOUNTER — HOSPITAL ENCOUNTER (OUTPATIENT)
Dept: BEHAVIORAL HEALTH | Facility: CLINIC | Age: 58
Discharge: HOME | End: 2023-07-10
Attending: FAMILY MEDICINE
Payer: COMMERCIAL

## 2023-07-10 PROCEDURE — H2035 A/D TX PROGRAM, PER HOUR: HCPCS | Mod: HQ

## 2023-07-10 NOTE — PROGRESS NOTES
7/6/23 received fax from [facility]. Plan of treatment 6/9/23 to 12/9/23. Needing review and provider signature.    Sent to nurse via e-mail.    Form signed and faxed back to [facility] at [phone]. Copy scanned into patient chart.

## 2023-07-10 NOTE — PROGRESS NOTES
"[facility] Weekly Treatment Plan Review      Attestation:   Jonathan Juares MD - Medical Director - Provides oversight and supervision of care.       Date Span:Monday: 7/10/23 through 23      Date     Group Therapy 2 hours 1 hour in lieu of group 2 hours 0 hours 0 hours       Individual Therapy                 Family Therapy                 Psychoeducation                 Other (Specify)                  Client has no absences this week    Client has one excused absence this week , due to medical appts.     Program Running Totals: (No Phase 1 IOP due to this program being only OP level of care)  Total # of Phase 2 OP Group Sessions:15 for 30   Virginia gets 30 sessions for 60 hours at this time  Total # of Phase 3 OP Recovery Management Group Sessions: None    Total # of 1:1 Sessions:  1 hour BALWINDER   1 hour 5/30  7/3                                    Darinel ,                     Weekly Treatment Plan Review     Treatment Plan initiated on: .    Dimension1: Acute Intoxication/Withdrawal Potential -   Previous Dimension Ratin  Current Dimension Ratin  Date of Last Use 22  Any reports of withdrawal symptoms - No      Dimension 2: Biomedical Conditions & Complications -   Previous Dimension Ratin  Current Dimension Ratin  Medical Concerns:wk 7/10Jasmyn reports rash and working with , also had CT and MRI related to ongoing care for cancer and need for liver.  wk of 7/3  I get an infusion 1x per month and usually by week 3, I feel more sore and more tired, but I am grateful for the infusions.  \"no concerns beyond ongoing liver cancer and Crohns\"  wk of  She saw Jazmin Barrios at Urgent care for skin issues.  She also said she met with her GI practioner  She reports having liver cancer and Crohn's disease.  \"right now I am doing pretty well  Outside medical appointments this week (list provider and reason " for visit): wk of  she saw Susanna RAZA for rash on leg.  Had a PeTH test , see below. No issues  :  she saw Jazmin Meng PA-C for a flare up of her psoriasis likely related to her other medical issues, per client     Current Medications & Medication Changes:  Current Outpatient Medications   Medication     ammonium lactate (AMLACTIN) 12 % external cream     clobetasol (TEMOVATE) 0.05 % external cream     furosemide (LASIX) 40 MG tablet     inFLIXimab (REMICADE) 100 MG injection     Multiple Vitamins-Minerals (MULTIVITAMIN & MINERAL PO)     spironolactone (ALDACTONE) 100 MG tablet     triamcinolone (KENALOG) 0.1 % external cream     No current facility-administered medications for this encounter.     Facility-Administered Medications Ordered in Other Encounters   Medication     BREEZA Lemon-Lime flavored oral liquid for Neutral Abdominal/Pelvic Imaging 1,000 mL     hyoscyamine (LEVSIN/SL) sublingual tablet 125 mcg     Medication Prescriber:  Linda Macdonald  Taking meds as prescribed? Yes  Medication side effects or concerns:  no        Dimension 3: Emotional/Behavioral Conditions & Complications -   Previous Dimension Ratin  Current Dimension Ratin  PHQ2:       2023     1:34 PM 2023    10:00 AM 3/28/2023    11:21 AM 3/26/2023     1:45 PM 2023     9:35 AM 1/3/2023     2:46 PM 2022    11:02 AM   PHQ-2 (  [employer])   Q1: Little interest or pleasure in doing things 0 0 0 0 0 0 0   Q2: Feeling down, depressed or hopeless 0 0 0 0 1 0 0   PHQ-2 Score 0 0 0 0 1 0 0   Q1: Little interest or pleasure in doing things   Not at all  Not at all Not at all Not at all   Q2: Feeling down, depressed or hopeless   Not at all  Several days Not at all Not at all   PHQ-2 Score   0  1 0 0      GAD2:       2023     9:54 AM 2023    10:00 AM   TAINA-2   Feeling nervous, anxious, or on edge 1 1   Not being able to stop or control worrying 1 0   TAINA-2 Total Score 2 1     PROMIS  10-Global Health (all questions and answers displayed):       2/16/2023     9:58 AM 2/22/2023     7:32 PM 5/25/2023    10:00 AM   PROMIS 10   In general, would you say your health is: Good Good    In general, would you say your quality of life is: Good Good    In general, how would you rate your physical health? Fair Fair    In general, how would you rate your mental health, including your mood and your ability to think? Good Good    In general, how would you rate your satisfaction with your social activities and relationships? Good Good    In general, please rate how well you carry out your usual social activities and roles Good Good    To what extent are you able to carry out your everyday physical activities such as walking, climbing stairs, carrying groceries, or moving a chair? Completely Completely    In the past 7 days, how often have you been bothered by emotional problems such as feeling anxious, depressed, or irritable? Sometimes Rarely    In the past 7 days, how would you rate your fatigue on average? Mild Mild    In the past 7 days, how would you rate your pain on average, where 0 means no pain, and 10 means worst imaginable pain? 3 3    In general, would you say your health is: 3 3 3   In general, would you say your quality of life is: 3 3 4   In general, how would you rate your physical health? 2 2 3   In general, how would you rate your mental health, including your mood and your ability to think? 3 3 4   In general, how would you rate your satisfaction with your social activities and relationships? 3 3 5   In general, please rate how well you carry out your usual social activities and roles. (This includes activities at home, at work and in your community, and responsibilities as a parent, child, spouse, employee, friend, etc.) 3 3 5   To what extent are you able to carry out your everyday physical activities such as walking, climbing stairs, carrying groceries, or moving a chair? 5 5 4   In the past  "7 days, how often have you been bothered by emotional problems such as feeling anxious, depressed, or irritable? 3 2 2   In the past 7 days, how would you rate your fatigue on average? 2 2 2   In the past 7 days, how would you rate your pain on average, where 0 means no pain, and 10 means worst imaginable pain? 3 3 4   Global Mental Health Score 12 13 17   Global Physical Health Score 15 15 14   PROMIS TOTAL - SUBSCORES 27 28 31     Mental health diagnosis none  Date of last SIB:  never  Date of  last SI:  never  Date of last HI: never  Behavioral Targets:  see tx plan  Risk factors:  Client dealing with liver disease/cancer and Crohns, client needs a new liver,   Protective factors:  spirituality, forward/future oriented thinking, safe and stable environment, regular physical activity, living with other people and structured day  Current MH Assignments:  see tx plan      Narrative:  wk of 7/10:  She was able to repeat the steps of 5 senses, as a review.   \"no thoughts of self harm or concerns\"  on likert scale 1-low and 10 -high:   Anxiety:  2  stress: 2  depression 2  issues to discuss and Helpful coping: talking with you guys.  I had a craving that was pretty big this week.  Psychoeducation/Skills The thinking-feeling connection. This topic will address information from the [employer] about how our thoughts influence our feelings.  Client was able to define automatic thoughts and make a connection with some that she has.   She said that she has been continueing to gain awareness of her beliefs vs perceptions vs what is a thought and what is a feeling.  Discussed that reminding herself it is ok to have thoughts and feelings, but they are not facts for other people.    Client participated in lovingkindness meditation and said it was just what she needed today and it that it was helpful and grounding.  \"Thank you for doing it\"    wk of 7/3: Clients participated in 5 senses psychoeducation " "discussion, participated in guided practice and was able to name 5 parts, and said she feels it will be a helpful tool  \"no thoughts of self harm or concerns\".   I discussed the research on gratitude journals and expressed she has seemed like she has an \"attitude of gratitude\".  She said she feels blessed and hopes she does.  on likert scale 1-low and 10 -high:   Anxiety:  0  stress: 0 depression 0  issues to discuss and Helpful coping: I am learning to say no, and that boundaries are good    wk of 6/26  Attended  skills group with Darinel Au.    She participated in psychoeducation on Core beliefsThis topic will discuss core beliefs, as a lens through which we see life and how that affects our choices.  Virginia said the discussion on core beliefs is something she will spend some time on, as the awareness is helpful.  I told her we will follow up on Wednesday with more that can be helpful when we notice harmful core beliefs.   \"no thoughts of self harm or concerns\"  on likert scale 1-low and 10 -high:   Anxiety:  1 stress: 1 depression 0  issues to discuss and Helpful coping: learning about self, anxiety and stress  wk of 6/19 \"no thoughts of self harm or no concerns\".  She reports on likert scale 1-low and 10 -high:   Anxiety:  1  stress: 1 depression 0  issues to discuss and Helpful coping: getting to know people better in group, hearing other's helpful coping.  Attended  skills group with Darinel Au.  We discussed anticipating some situations and \"gearing up\" by practicing deep breathing. I reminded client of previous discussions where beliefs often guide how we feel about certain situations, but that we can reframe them, or look at them for the particular situation.      Wk of 6/12  I am feeling much better and less anxiety about group attendance as I continue to attend and get to know others. \"no thoughts of self harm or concerns\". On likert scale 1-low and 10 -high:   Anxiety:  1  stress: 1 " "depression 1  issues to discuss and Helpful coping:  Breathing, getting to others in group  Wk of  \"no thoughts of self harm\"  Attended MH skills group with Darinel Caojeannette  Week of 23 :Client reports feeling anxious due to first treatment experience, not knowing what to expect, her  Mother's declining health-needing a placement for higher level of care, spouse recently lost his job-need to obtain COBRA insurance and unable to care for grandchildren because of attending treatment. Client was saw Traci Aguilar and has not seen them in about 6 weeks. To follow up with primary counselor re: seeing psychotherapist or return to [facility].  Client rates the following on a scale of 1 (low) to 10 (high):  Anxiety- 5  Depression -1  Stress- 5 as a result of above concerns.   Client participated in psychotherapy/education on self-care, introduction to relaxation meditation-tensing and relaxing muscles, and sleep hygiene.  Current Mental Health symptoms include: . Active interventions to stabilize mental health symptoms this week :   At SI her PHQ-2 score was 0 and his TAINA-2 score was 1.  PROMISE 10 was  31:  She reports that at this time she is dealing with her mother's living situation and health issues. She reports that her father has passed away and that she is estranged from 1 sister.         Dimension 4: Treatment Acceptance / Resistance -   Previous Dimension Ratin  Current Dimension Ratin  RENEE Diagnosis:  Alcohol Use Disorder   303.90 (F10.20) Severe In a controlled environment  Commitment to tx process/Stage of change- contemplation  RENEE assignments - see tx plan      Narrative - wk of 7/10  My motivation for sobriety is 10/10\".  I think it is so important to attend and be dedicated to support in this group, and to \"say things outloud, so it takes the power away, as you say, Alana\"  wk of 7/3  on likert scale 1-low and 10 -high:  motivation for sobriety 10.  Virginia said  session " "really stuck with her and she thought about a lot of things and is really understanding why attendance and building on the days and lessons is important.     wk of   sober support motivation:  2, but I think motivation will grow as I keep settling in and I am motivated 10/10 for sobreity.    Wk of :  Client reports motivation for sobriety is 10 (high) Motivation for attending community support 5 (on scale 1 low-10 high).   Virginia expressed to her group how helpful they are to her and how much she appreciates all that members shared about their stories.  She said she gains such helpful insight for self   Week of  Virginia discussed that she has been late a couple times, but realized with 2 other members gone, how this speaks to the importance of being on time and valuing and respecting others time, too.   She will also miss tomorrows group due to a long awaited medical appt. Dim 4: on likert scale 1-low and 10 -high:  Motivation for sobriety:  10  motivation for sober support attendance: not doing yet  Wk of   Virginia also said she finds motivation for sobriety in her family and friends, \"but really deep down need to do this for myself\"  Told counselor and group how she didn't think she would get much out of tx and already is getting so much and is grateful for older adult group   Week of 23: Client reports motivation for sobriety is 10 (high) Motivation for attending community support 5 (on scale 1 low-10 high).  The patient is motivated, to explore treatment.  She said \"of course I am partly here to fulfill requirements for liver transplant, but I want to learn healthy coping for lifelong sobriety.      Dimension 5: Relapse / Continued Problem Potential -   Previous Dimension Ratin  Current Dimension Ratin  Relapses this week - None  Urges to use - None  UA results -   Recent Results (from the past 168 hour(s))   Erythrocyte sedimentation rate auto    Collection Time: 23  9:12 AM   Result " Value Ref Range    Erythrocyte Sedimentation Rate 36 (H) 0 - 30 mm/hr   Hepatic panel    Collection Time: 07/07/23  9:12 AM   Result Value Ref Range    Protein Total 7.5 6.4 - 8.3 g/dL    Albumin 3.4 (L) 3.5 - 5.2 g/dL    Bilirubin Total 1.4 (H) <=1.2 mg/dL    Alkaline Phosphatase 156 (H) 35 - 104 U/L    AST 60 (H) 0 - 45 U/L    ALT 33 0 - 50 U/L    Bilirubin Direct 0.41 (H) 0.00 - 0.30 mg/dL   CRP inflammation    Collection Time: 07/07/23  9:12 AM   Result Value Ref Range    CRP Inflammation 4.22 <5.00 mg/L   Basic metabolic panel    Collection Time: 07/07/23  9:12 AM   Result Value Ref Range    Sodium 133 (L) 136 - 145 mmol/L    Potassium 4.1 3.4 - 5.3 mmol/L    Chloride 104 98 - 107 mmol/L    Carbon Dioxide (CO2) 23 22 - 29 mmol/L    Anion Gap 6 (L) 7 - 15 mmol/L    Urea Nitrogen 12.2 8.0 - 23.0 mg/dL    Creatinine 0.70 0.51 - 0.95 mg/dL    Calcium 8.8 8.6 - 10.0 mg/dL    Glucose 93 70 - 99 mg/dL    GFR Estimate >90 >60 mL/min/1.73m2   AFP tumor marker    Collection Time: 07/07/23  9:12 AM   Result Value Ref Range    AFP tumor marker 81.0 (H) <=8.3 ng/mL   Phosphatidylethanol (PEth), Whole Blood    Collection Time: 07/07/23  9:12 AM   Result Value Ref Range    PEth 16:0/18:1 (POPEth) <10 ng/mL    PEth 16:0/18:2 (PLPEth) <10 ng/mL    EER Phosphatidylethanol (PETH) See Note    INR    Collection Time: 07/07/23  9:12 AM   Result Value Ref Range    INR 1.16 (H) 0.85 - 1.15   CBC with platelets and differential    Collection Time: 07/07/23  9:12 AM   Result Value Ref Range    WBC Count 4.7 4.0 - 11.0 10e3/uL    RBC Count 3.97 3.80 - 5.20 10e6/uL    Hemoglobin 14.0 11.7 - 15.7 g/dL    Hematocrit 41.9 35.0 - 47.0 %     (H) 78 - 100 fL    MCH 35.3 (H) 26.5 - 33.0 pg    MCHC 33.4 31.5 - 36.5 g/dL    RDW 14.7 10.0 - 15.0 %    Platelet Count 113 (L) 150 - 450 10e3/uL    % Neutrophils 33 %    % Lymphocytes 20 %    % Monocytes 8 %    % Eosinophils 38 %    % Basophils 1 %    % Immature Granulocytes 0 %    NRBCs per 100  "WBC 0 <1 /100    Absolute Neutrophils 1.5 (L) 1.6 - 8.3 10e3/uL    Absolute Lymphocytes 0.9 0.8 - 5.3 10e3/uL    Absolute Monocytes 0.4 0.0 - 1.3 10e3/uL    Absolute Eosinophils 1.8 (H) 0.0 - 0.7 10e3/uL    Absolute Basophils 0.1 0.0 - 0.2 10e3/uL    Absolute Immature Granulocytes 0.0 <=0.4 10e3/uL    Absolute NRBCs 0.0 10e3/uL     Using ReSet Clem: N/A    Narrative-  Wk of 7/10 Had a PeTH test 7/7, see above. No issues. She discussed the Serenity Prayer and that she finds it helpful.  She also discussed having cravings at a 4 of 10 when cooking, and what she found helpful:  Processed, talked with , talk with group today about it.   Virginia said she really had quite a learning week with events in group, discussions and assignments.  \"My heart goes out to the new person the other day, but also I had some anger about all the disruptions and negativity from her\"  \"I am ready to move on, and learn what I learned, and know that could be any one of us\"  wk of 7/5  cravings, warning signs, triggers: none.  My adult kids are caring and asked if I thought I would be OK going to [facility], as there is drinking there.  She told them she appreciates the concern, but feels with them and little to no triggers, for quite some time, she didn't feel it was a problem.  Virginia said she had a wonderful time with her family and is not concerned about use over 4th of July holiday either, she has a good plan for spending time with family and focusing on good food.   wk of 6/26  Dim 5: cravings, warning signs, triggers: none.  Wed reading was focused blame.  It said blaming is hiding, that when we blame others we try to hide our character defects.  We discussed the meaning of character defects. \"I was in big denial for a long time, it took being told I would need a new liver for me to really realize what drinking was doing. \"it hit me like a ton of bricks when I finally really heard it\"  wk of 6/19  She participated in  " "Psychoeducation/Skills: Self-Care.  Client s completed self-care assessment and described self-care as feeling better physically. Client identified physical self-care they do well- attending medical appointments, one they do poorly or needs improvement- eating healthy food and barriers that prevent themselves from self-care - cost of food is expensive, the shopping and prep.  This topic will give a general overview of self-care: physical, environmental, emotional, social and spiritual. It will explore importance of self-care and the impact on recovery.   Wk of . Dim 5: warning signs, triggers and cravings: none   helpful coping: attending group. Examples of benefits of drinking or drug use, or other behaviors Physical, Social mental or emotional: confidence, escape from reality, \"friends\"     Main drawbacks connected to these: not real friends that only want to drink together.  Headaches and slurred speech.  Remoarse  Benefits connected to abstinence from addictive behaviors:more able to do me and be me, I enjoy trud friends, I remember things better and will have better memories      Drawbacks you see connected with abstinence from these:  Wk of she participated in the daily reading and said she liked it.  Virginia also gave meaningful feedback to client who discussed use episode: \"it could be anyone of us\"  She said this is important and she is learning from others sharing.  Week of 23: Client denies riggers or warning signs this past week. She said she liked the saying \"it is just as easy to create good habits as bad ones\"   Patient reports family members and her  are concerned about their substance use.  Patient reports their recovery goals are \"abstinence forever.\"  She has had no previous detoxes or treatments and needs to gain insight into healthy coping for relapse prevention.      Dimension 6: Recovery Environment -   Previous Dimension Ratin  Current Dimension Ratin  Family " "Involvement - not at this time  Summarize attendance at family groups and family sessions - NA  Family supportive of treatment?  Yes    Community support group attendance - no  Recreational activities - some camping, cooking and boating    Narrative - wk of 7/10: \"I am motivated for outside support at 2/10, but when I get done with Tuesday group, I think my motivtiaon will be higher\"  wk of 7/5   We discussed \"The power of Solitude\" and how spending time alone with ourselves may not be easy or even desirable, it it is key to getting to know self.  I emphasized that studies tell us we need each other to survive and be happy, but also when we loose the ability to be alone with ourselves, sometimes our overstimulated nervous systems suffer from no place to rest and recharge.  She said this is good to discuss, as she may even consider doing it a bit more, as it \"really does recharge me\" She said she is pretty good about finding alone time and also uses journaling.  I asked client how this applies to recovery and treatment:She said she will often take a topic from group, or a question and journal about.  She said it really helps   Wk of 6/26. She said sober support motivation is  2, but I think motivation will grow as I keep settling in  I talked with clients about taking responsibility for our actions, so we can be empowered and own our life and also change if we need to. We discussed resources including AA, SMART Recovery.  I encouraged clients to start exploring meetings.  Wk of 6/19.  Virginia reports with having group 3x per week, it is a good deal of support, \" I will continue to learn about peer support\"  wk of 6/12client discussed resources sober support, including AA, Liver Transplant support and [place] recovery connection  wk of 6/6  goals for the wknd: look into volunteering at [facility] for writing card.  \"no community support attendance at this time\"   Client is not currently attending community sup[ort and does not have " "a sponsor. Client's supportive people are her family. Client shared she has friends and has not talked with many about her current situation, because \"I am a private person,\" (treatment and needing transplant).  Patient reports they are involved in community of holden activities    They reports spirituality impacts recovery in the following ways:  \"There was some changes as far as .  We do belong to a [employer] but it's been a number of years since we've been.\"  Patient believes her spirituality and sobriety are connected. Patient reported having 3 children, all adults.  Patient has 2 grandchildren.    She lives with her spouse and son. recreational/leisure activities: camping, cooking, boating, playing board games and card games.  Patient is currently disabled due to her Crohn's Disease.  Patient reports their income is obtained through SSDI disability; spouse.  Patient does not identify finances as a current stressor.         Progress made on transition planning goals: just began tx    Justification for Continued Treatment at this Level of Care:  No previous tx, needs 6 months of sobriety to be eligible for a transplant, needs to learn long term relapse prevention, she reports finding that attending group is helpful for her and she is learning.She said she did not think she would get much out of this but is getting so much support and understanding of healthy coping.  She is not attending community support, at this time.  She is working on goals and assignments.  She gets regular PEth  tests, and there have been no indications of alcohol use.    Treatment coordination activities this week: None  Need for peer recovery support referral? No    Discharge Planning: Not at this time      Has vulnerable adult status change? No    Interdisciplinary Clinical Supervision including: no    Are Treatment Plan goals/objectives effective? Yes  *If no, list changes to treatment plan:    Are the current goals meeting client's " needs? Yes  *If no, list the changes to treatment plan.  Plan:  Continue per Master Treatment Plan    *Client agrees with any changes to the treatment plan: Yes  *Client received copy of changes: Yes  *Client is aware of right to access a treatment plan review: Yes     Staff Members Contributing To Weekly Review:    Judi Dodd, MS, LADC, LPC  Lead Counselor Adult [facility] IOP/OP Programs Darinel Au, DOUG, LMFT, LADC, CGTP-[place] Licensed [facility] Psych[facility]

## 2023-07-10 NOTE — GROUP NOTE
Group Therapy Documentation    PATIENT'S NAME: Abiola Matute  MRN:   [id number]  :   1965  ACCT. NUMBER: [id number]  DATE OF SERVICE: 7/10/23  START TIME: 12:00 PM  END TIME:  1:00 PM  FACILITATOR(S): Judi Dodd LADC  TOPIC: BEH Group Therapy  Number of patients attending the group:  3  Group Length:  2 Hours    Group Therapy Type: Addiction    Summary of Group / Topics Discussed:    .      Today a new client started and she discussed her anxiety, group shared their names and a bit about themselves and that their first day they were anxious also.  Client's discussed the reading which was about not turning minor challenges into monumental barriers, and how to do that.  It was about realizing who and what we have control in and turning to one's Higher Power. I gave clients a copy of the serenity prayer and we discussed how this could be complimentary to the reading.  We reviewed the Five Senses of mindfulness.  Clients did check in.  The new group member discussed her last use and group processed this, as client reported using yesterday. Group members expressed concern and wished her well.    Attestation: Chris Juares MD - Medical Director - Provides oversight and supervision of care.       Group Attendance:  Attended group session    Patient's response to the group topic/interactions:  cooperative with task, discussed personal experience with topic and expressed readiness to alter behaviors    Patient appeared to be Attentive and Engaged.          Client specific details: Virginia said she continues to learn about self and how scary this disease is.  She shared with the new client her concerns, being client drank the day before.  Virginia expressed how important group structure and support is for her and she hopes the new client can get the support she needs to be sober in group. Today's session focussed on check in of all dimensions and dim 3 review of 5 senses and dim 5 the Serenity prayer as a  "tool    Client said of the reading: I can tend to make mountains out of molehills, and this is a good reading  . When I asked her what is helpful to her when she does this she said  \"my  is really helpful to grounding me, and so are the exercises we learn\" .    Dim1: ASHISH: no change reported. Had a PEth test 7/7, no signs of any use of alcohol.  Dim2: medical issues or appts:  Getting the rash on my leg taken care of.  Had a PEth test, CT, MRI  Dim 3: \"no thoughts of self harm or concerns\"  on likert scale 1-low and 10 -high:   Anxiety:  2  stress: 2  depression 2  issues to discuss and Helpful coping: talking with you guys.  I had a craving that was pretty big this weekend.    Dim 4 on likert scale 1-low and 10 -high:  motivation for sobriety: 10  Dim 5: cravings, warning signs, triggers: had cravings when cooking, I took time to process with my self, then  talked to my  and now saying it outloud in group.  Dim 6 sober support motivation:  10 for group and 2 for community, but I will eventually do AA or SMART or something    P)Continue to use 5 senses, continue to discuss triggers and cravings. PEth test as recommended.    Attestation:   Jonathan Juares MD - Medical Director - Provides oversight and supervision of care.     Judi Dodd, MS, LADC, LPC                                                   "

## 2023-07-11 ENCOUNTER — HOSPITAL ENCOUNTER (OUTPATIENT)
Dept: BEHAVIORAL HEALTH | Facility: CLINIC | Age: 58
Discharge: HOME | End: 2023-07-11
Attending: FAMILY MEDICINE
Payer: COMMERCIAL

## 2023-07-11 ENCOUNTER — PATIENT OUTREACH (OUTPATIENT)
Dept: GASTROENTEROLOGY | Facility: CLINIC | Age: 58
End: 2023-07-11

## 2023-07-11 PROCEDURE — H2035 A/D TX PROGRAM, PER HOUR: HCPCS

## 2023-07-11 NOTE — PROGRESS NOTES
"Attestation: Chris Juares MD - Medical Director - Provides oversight and supervision of care.    Individual Session Summary In Lieu of Group   START TIME: 12:00PM   END TIME: 1:00PM   Duration: 1 Hour    Abiola Matute is a 58 year old female who is being evaluated via a in-person video visit.      Note: Due to only 2 group member(s) in attendance, we did an individual session today, in lieu of group, due to policy.     Data: Met with Abiola on this date for an individual session in lieu of group due to policy. The session focused on DIM(s) 3 and 5 of her individual treatment plan. Abiola denies any suicidal thoughts, plans, or intent today. Abiola also denies any thoughts or behaviors of self-harm today. On a 0-10 Likert Scale (0=No issues/symptoms & 10=worst issues/symptoms), Abiola checked-in with rating her depression as a \"0\", anxiety as a \"2\", and rated her cravings as a \"0\". On a different 0-10 Likert Scale (0=low motivation and attendance & 10=highest level of motivation and attendance), Abiola rated her motivation for sobriety as a \"10\", and lastly Abiola rated her attendance over this past week as a \"10\".  Overall Abiola reported feeling \"anxious\" today. Abiola reported being grateful for \"time with grandchildren\". Abiola was asked what strength she has that she can use to strengthen her recovery, in which Abiola stated, \"passionate\". Today's session focused on Dimension(s) 3 & 5. Abiola showed progress by being able to teach back the skills she learned during today's session.     Defining Resentment  > Abiola learned about how the word resentment is best understood as having bitter indignation at being treated unfairly or unkindly. Resentment can come when they are misunderstood, neglected, rejected, slighted, and mistreated. This treatment is followed by bitterness, discontentment, and dissatisfaction. It can bring about ill will, animosity toward others, and feelings of envy and " beatrisusy. Abiola learned how when we are resentful, we are not merely reviewing the facts of a slight or an argument. Instead, we re-experience and relive them in a way that affects us emotionally, physiologically, and spiritually in very destructive ways. Failure to let go of resentment will further disintegrate connections with others and damage intimate relationships.  The Infection  > I used a short story to provide client with an example of those with past trauma or wounds;  years ago I had a finger that began to throb; it turned pink and ugly and was highly sensitive to even the slightest bump. This extreme sensitivity did not occur because I was weak or unable to handle pain. I was not distorting or faking the pain and intense feelings. This reaction occurred because the finger was infected.  Discussed how people who have been wounded in the past often carry emotional infections. These are not external wounds, but internal, emotional wounds that have gone unhealed and have been allowed to fester. Once that has occurred, it may result in an over sensitivity to even the slightest bump. Mistakes, hurts, and slights are filtered through the painful experiences of the past. The current pain we feel is added to the pain of the past, resulting in increased resentment. The original wound and the original wounder get mixed up with present-day events and present-day people. These inaccuracies and distortions cause further relational heartache.   Changing The Way You Think  > Abiola learned how while we cannot change the past, we can change the way we think about the past. It is our perception that can be altered. Rather than being bitter, angry, and resentful about what happened, we can change the way we think about it. Although we cannot change the past, we can change the way we see ourselves because of what happened in the past. We do not need to see ourselves as inadequate, inferior, or incompetent. Resentment is  "fueled by continually reviewing and thinking over what happened, which only encourages the bitterness and resentments to grow. Abiola was asked what common thoughts she has had behind resentments. Abiola stated, \"I could never please them, no matter how hard I tried, and I was often misunderstood in my feelings and actions.\" Abiola was asked what thoughts and feelings were evoked when she thought about her resentments? Abiola stated, \"brought up or reminded me of the level of frustration I've had.\"   How It Works  > Abiola learned how a resentment often remains because we justify our right to be offended. Letting go of resentment grates against our sense of justice. We were wronged and therefore feel we have a right to be hurt, angry, and offended. With that attitude, resentment lingers, and bitterness develops. The person filled with resentment is often reluctant to admit responsibility or fault and is resistant to being held accountable for future actions. Abiola learned that if they carry bitterness and resentment, they may be quick to blame others for the problems that arise. When we are injured by someone, who we are close to, or intimately involved with, the wound will often have longer lasting effects. Injuries resulting in resentful feelings are often caused by those closest to us. We feel betrayed and resentful because we expected better treatment and more love and affection from those closest to us.   Effects of Holding Resentment  > Abiola learned about the effects of holding on to a resentment. Abiola learned that when a resentment goes unresolved, it can have a variety of negative effects. They may feel easily irritated, touchy, or have a sense of edginess when they think about the person who has hurt them. There may be denial of anger on the surface, but inwardly, hidden damage is increasing. Focusing on past hurts may cause the Abiola to feel like a victim in every difficult situation. This " "will alter their perception of reality and make it difficult to have gratifying, rewarding, and positive interpersonal experiences. The Abiola will see what they are looking for. If they expect to be mistreated, slighted, and offended, they will surely find that they are.  It Gets Worse  > Abiola learned about how the long-term effects of bitterness may include the development of a hostile, cynical, and sarcastic attitude. This may further damage relationships and thwart their own personal and emotional growth. It may become increasingly more difficult to communicate their emotions to others, trust other people, and may result in a loss of self-confidence. Future miscommunication may result, which only increases resentful feelings and makes reconciliation more difficult and unlikely. Abiola was asked who someone is in her life that she still harbored resentments towards. Abiola stated, \"my older sister and my former employer/boss.\"   Resolving Resentments  > Abiola was taught about 10 common ways to help reduce anger, bitterness, and resentment:  1. Gain awareness of the emotions they experience regarding their past hurt.   2. Acknowledge how the past hurt or action affected them when it happened.   3. Reflect on what they began to think and believe about themselves because of what they endured.   4. Begin to alter what they think and believe in the present, rather than mentally repeating the offending behavior or focusing on the offender.   5. Forgive the original wounder. This enables them to react to the current offense with less intensity.   6. Examine how their resentment may come from confusing people in their present life, with people from their past.   7. Put a thought between their feelings of resentment and their indulgence in spending time thinking about them, such as  I'm able to forgive,  or  Pain from the past no longer shapes my present or future.    8. Think like a forgiving person, \"I'm able to " "forgive the faults of others, like I would want them to forgive my faults .   9. Focus on things they can control.   10. Accept that people make mistakes and are imperfect.   Let it go  > Abiola learned that carrying past resentment can make them thin-skinned. You may find yourself often and easily offended. Think for a minute about the game of hockey. The players on each team often get bumped, even by teammates. They do not resent the other players, but instead see it as part of the sport. Life is a lot like hockey. You are going to get bumped. Do not be surprised or shaken when difficulties arise, you do not get treated with respect, or you get bumped in life. Let it go, overlook it, and cover it over; become a master at forgiveness. Abiola was asked what changes can she make in the way she thinks and believes. Abiola stated, \"I can view things with more optimism like the half being half full rather than half empty.\"   > On a 0-10 Likert Scale (0=lowest confidence & 10=most confidence), Abiola was asked to rate her confidence with using the skills learned on \"Understanding Resentments and Bitterness\" to help her strengthen her recovery both with MH and RENEE. Abiola stated her rating was a \"8\".   Intervention: Individual session with Abiola in lieu of group due to policy around not enough group members present to hold group. Provided Abiola with verbal interventions including a lot of validation, support, active and reflective listening, genuine positive regard, and psychoeducation.      Assessment: Abiola presents and appears engaged, insightful, motivated, and willing.  Abiola reports taking proactive steps to reduce mental health symptoms and stressors in her life. Abiola appears very open and willing to learn new skills that will help her maintain lifelong recovery.     Plan: Abiola selected at least one statement that she was going to use to change her thinking. Abiola will write these statements " down in three different places that she will see daily and actively say out load to self at least three times every day. Abiola will gain mastery by practicing these statements daily and declaring them with others.     I have reviewed the note as documented above.  This accurately captures the substance of my visit with this client.      Darinel Au, DOUG, LMFT, LADC, CGTP-[place]  [facility]ensed [facility]  [facility]  P: [phone] I F: [phone]

## 2023-07-11 NOTE — PROGRESS NOTES
Spoke with Yolande - KP at [facility], confirm infusion completion for new dose at 5mg/kg.  1st = 6/9/23  2nd = 7/6/23  3rd = 8/3/23    Total infusion = ? (Yolande will call back with this info)

## 2023-07-11 NOTE — TELEPHONE ENCOUNTER
Inflectra 5 mg/kg q4w.   1st @ 5mg/kg = 6/8/23   2nd @ 7/6/23  3rd @ 8/3/23 need predictr pK prior to 3rd dose   -------------------------------------------------------------------  Order Predictr pK to be drawn prior to 3rd infusion at the de-escalation rate  Inflectra 5 mg/kg [id number].    Last dose: 7/6/23  Next dose: 8/3/23    Hello,    Can you please fill out and scan into the patients chart a [facility]    Medication:  Remicade  Dose: 5 mg/kg  Total Dose: 400 mg  Frequency: every 4 weeks  Provider: Dr. Schilling  Last infusion: 7/6/23  Next infusion: 8/3/23  When is lab due: prior to 8/3/23 infusion  Does the patient need a lab appointment: no will get it at her infusion    Thank you

## 2023-07-12 ENCOUNTER — HOSPITAL ENCOUNTER (OUTPATIENT)
Dept: BEHAVIORAL HEALTH | Facility: CLINIC | Age: 58
Discharge: HOME | End: 2023-07-12
Attending: FAMILY MEDICINE
Payer: COMMERCIAL

## 2023-07-12 PROCEDURE — H2035 A/D TX PROGRAM, PER HOUR: HCPCS | Mod: HQ

## 2023-07-12 NOTE — GROUP NOTE
"Group Therapy Documentation    PATIENT'S NAME: Abiola Matute  MRN:   [id number]  :   1965  ACCT. NUMBER: [id number]  DATE OF SERVICE: 23  START TIME: 12:00 PM  END TIME:  2:00 PM  FACILITATOR(S): Judi Dodd LADC  TOPIC: BEH Group Therapy  Number of patients attending the group: 3  Group Length:  2 Hours    Group Therapy Type: Addiction and Psychoeducation    Summary of Group / Topics Discussed:    .      Attestation:   Jonathan Juares MD - Medical Director - Provides oversight and supervision of care.     Today in group we discussed/processed  the events and clients residual feelings from Monday's group, related to new client leaving and events which occurred while in group. Clients named 3 feelings related to this or just to what they are feeling today.  We discussed psychoeducation below.  Clients received a handout on the \"Thinking -feeling connection\" and Group discussed  expressing which ones they found helpful in the past, which are new.  Clients and I discussed  \"The poisoned parrot\" and how a parrot is just a parrot, doing and saying what it does, so do our minds just think what they think.  I discussed that with awareness we can \"throw a towel\" over the Parrots cage/ our thoughts, or in other words stop listening if not helpful and change thoughts to something more truthful or helpful for the time.  We ended group with a lovingkindness meditation.     Psychoeducation/Skills The thinking-feeling connection    This topic will address information from the [place] [facility] about how our thoughts influence our feelings.       Objective(s):   -Patient will identify what automatic thoughts are  -Patient will learn strategies to recognize automatic thoughts     Structure (modalities, homework, worksheets, etc)      -Facilitate group discussion on the thinking feeling connection   -Provide psychoeducation on research supporting automatic thoughts and perception  -Use teach-back " "techniques to ensure patients understanding   -Patient will participate in Five senses practice  -Patient will receive handout on the thinking feeling connection     Expected therapeutic outcome(s):     Patient will:   -Patient will notice automatic thoughts    Therapeutic outcome(s) measured by:      Patient's ability to define automatic thoughts  Patient will name 3 feelings connected to group situation that happened        Group Attendance:  Attended group session    Patient's response to the group topic/interactions:  cooperative with task, discussed personal experience with topic and expressed understanding of topic    Patient appeared to be Attentive and Engaged.        Clients specifice information :  Virginia said she really had quite a learning week with events in group, discussions and assignments.  \"My heart goes out to the new person the other day, but also I had some anger about all the disruptions and negativity from her\"  Virginia said she liked the reading and discussion on \"the poisoned parrot\"  She said she could relate to the voice that gets on her own case and finds any way in.  I asked if she heard any similarities in this and warning signs and triggers, \"Yes\" she said she sees a total connection and how they can seem relentless, but we can throw a towel over them, or move the cage to a different room, \"not listen to cravings and triggers, move on\"  Today's session focused on check on dimension 3 the thinking-feelings connection and dimension 5 slogans for recoverying people    Client was able to define automatic thoughts and describe some that she has.   She said that she has been continueing to gain awareness of her beliefs vs perceptions vs what is a thought and what is a feeling.  Discussed that reminding herself it is ok to have thoughts and feelings, but they are not facts for other people.    Client participated in lovingkindness meditation and said it was just what she needed today and it that " "it was helpful and grounding.  \"Thank you for doing it\"     P) Write down automatic thoughts, share with group on Monday.  \"throw a towel\" over unhealthy thoughts when noticing    Attestation:   Jonathan Juares MD - Medical Director - Provides oversight and supervision of care.         Judi Dodd MS, LADC, LPC          "

## 2023-07-14 ENCOUNTER — VIRTUAL VISIT (OUTPATIENT)
Dept: RADIOLOGY | Facility: CLINIC | Age: 58
End: 2023-07-14
Attending: RADIOLOGY
Payer: COMMERCIAL

## 2023-07-14 VITALS — WEIGHT: 182 LBS | HEIGHT: 66 IN | BODY MASS INDEX: 29.25 KG/M2

## 2023-07-14 DIAGNOSIS — I77.810 ASCENDING AORTA DILATION (H): ICD-10-CM

## 2023-07-14 DIAGNOSIS — C22.0 HCC (HEPATOCELLULAR CARCINOMA) (H): Primary | ICD-10-CM

## 2023-07-14 PROCEDURE — 99215 OFFICE O/P EST HI 40 MIN: CPT | Mod: VID | Performed by: RADIOLOGY

## 2023-07-14 ASSESSMENT — PAIN SCALES - GENERAL: PAINLEVEL: NO PAIN (0)

## 2023-07-14 NOTE — NURSING NOTE
Is the patient currently in the state of [place]? YES    Visit mode:VIDEO    If the visit is dropped, the patient can be reconnected by: VIDEO VISIT: Text to cell phone: [phone]    Will anyone else be joining the visit? NO      How would you like to obtain your AVS? MyChart    Are changes needed to the allergy or medication list? NO  Patient verified medications and allergies are correct via eCheck-in. Patient confirms no changes at this time and/or since last reviewed by clinic staff.    Reason for visit: RECHECK (Oncology Video Visit Return, HCC, review MRI and CT results)

## 2023-07-14 NOTE — PROGRESS NOTES
Interventional Radiology Clinic Visit    Date of this visit: 7/14/2023    Abiola Matute returns for follow up post radioembolization of HCC.    Primary Physician: Linda Macdonald        History Of Present Illness:    Abiola Matute is a 58 year old female who presents with diagnosis of unresectable hepatocellular carcinoma (HCC) on a background of cirrhosis and Crohn's disease, the latter in remission and predominantly perianal disease which is inactive.     In 12/2022, she was seen in the ED for chest pain, palpitations, and treated for cellulitis. CT chest at that time was negative for PE, but did show a new right hepatic lobe mass measuring ~3 cm. Subsequent MRI showed this was HCC. AFP was 271. She is followed by Dr. Gupta from oncology. She was not a surgical candidate due to the presence of portal hypertension so we performed selective Theraspheres radioembolization of the mass on 2/27/2023.  Follow-up imaging at 1 month showed decreased size of the lesion and AFP had decreased to 149 and then 54.  She presents for further follow-up today.    Today feels well. No symptoms of concern. No abdominal pain.  Previously reported headaches have subsided.    Review of Systems:    As above    Past Medical/Surgical History:    Past Medical History:   Diagnosis Date     Alcohol abuse      Alcoholic cirrhosis of liver with ascites      Anal fistula      Atypical ductal hyperplasia of breast 09/10/2010    ERT not recommended -left - and flat epithelial atypia-scheduled for breast biopsy 9/17/2010      Bifid uvula      Cholelithiasis      Contact perianal dermatitis and other eczema     recurrent - clobetasol      Crohn disease      Fear of flying      - gets Ativan prn.      HCC (hepatocellular carcinoma) (H) 2/1/2023     Hypertension      IBS (irritable bowel syndrome)      Past Surgical History:   Procedure Laterality Date     BIOPSY ANAL N/A 3/28/2019    anal biopsy and culure placement of elizabethon - Dr Fleming      BIOPSY BREAST Left 09/17/2010    - scheduled with Dr. Varma      BIOPSY LIVER  2019     COLONOSCOPY  2006     COLONOSCOPY N/A 12/23/2014    Procedure: COMBINED COLONOSCOPY, SINGLE OR MULTIPLE BIOPSY/POLYPECTOMY BY BIOPSY;  Surgeon: Diane Fleming MD;  Location:  GI     COLONOSCOPY N/A 12/5/2019    Procedure: COLONOSCOPY, WITH POLYPECTOMY AND BIOPSY;  Surgeon: Farhan Schilling MD;  Location: [facility]     ENDOSCOPIC RETROGRADE CHOLANGIOPANCREATOGRAM N/A 4/24/2020    Procedure: ENDOSCOPIC RETROGRADE CHOLANGIOPANCREATOGRAPHY WITH, sledge removal,sphincterotomy, stent in gallbladder and pancreatic duct stent, and balloon dilation;  Surgeon: Guru Isac Kraft MD;  Location: [facility] OR     ESOPHAGOSCOPY, GASTROSCOPY, DUODENOSCOPY (EGD), COMBINED N/A 12/5/2019    Procedure: ESOPHAGOGASTRODUODENOSCOPY (EGD);  Surgeon: Farhan Schilling MD;  Location: [facility] GI     ESOPHAGOSCOPY, GASTROSCOPY, DUODENOSCOPY (EGD), COMBINED N/A 5/11/2023    Procedure: Esophagoscopy, gastroscopy, duodenoscopy (EGD), combined;  Surgeon: Jeannette Cervantes MD;  Location: [facility] GI     EXAM UNDER ANESTHESIA ANUS N/A 3/28/2019    Procedure: EXAM UNDER ANESTHESIA ANUS;  Surgeon: Diane Fleming MD;  Location:  OR     EXAM UNDER ANESTHESIA ANUS N/A 2/26/2021    Procedure: EXAM UNDER ANESTHESIA OF ANUS, SETON PLACEMENT, EXCISION OF SKIN BRIDGE;  Surgeon: Avtar Nam MD;  [place] [facility]     EXAM UNDER ANESTHESIA ANUS N/A 1/6/2023    Procedure: EXAM UNDER ANESTHESIA, ANUS, SETON EXCHANGE, partial fistulotomy;  Surgeon: Mary Dugan MD;  [place] [facility]     HYSTERECTOMY, VAGINAL  2006    with Dr. Licha Zhou - with BSO for fibroids      IR GALLBLADDER DRAIN PLACEMENT  4/22/2020     IR SIRT (SELECTIVE INTERNAL RADIO THERAPY)  2/21/2023     IR VISCERAL ANGIOGRAM  2/21/2023     IR VISCERAL EMBOLIZATION  2/27/2023     OPEN REDUCTION INTERNAL FIXATION ANKLE Left at age 27    plates and screws  removed at age 36     Pelviscopy with removal of bilateral hydrosalpinges.  04/15/2010     [facility] APPENDECTOMY  at age 14       Current Medications:    Current Outpatient Medications   Medication Sig Dispense Refill     ammonium lactate (AMLACTIN) 12 % external cream Apply topically 2 times daily 385 g 3     clobetasol (TEMOVATE) 0.05 % external cream Apply to AA BID x 1-2 weeks then PRN. Do not apply to face. 60 g 3     furosemide (LASIX) 40 MG tablet TAKE 1 TABLET BY MOUTH  DAILY 90 tablet 3     inFLIXimab (REMICADE) 100 MG injection Inject into the vein once Next dose 12/16/22       Multiple Vitamins-Minerals (MULTIVITAMIN & MINERAL PO) Take by mouth every morning       spironolactone (ALDACTONE) 100 MG tablet Take 1 tablet (100 mg) by mouth every morning 90 tablet 3     triamcinolone (KENALOG) 0.1 % external cream Apply to AA BID x 1-2 week then PRN only 80 g 3       Allergies:    Fish oil, Metronidazole, Ppd [tuberculin purified protein derivative], Sulfa antibiotics, and Terbinafine and related    Family History:    Family History   Problem Relation Age of Onset     Breast Cancer Mother      Gastrointestinal Disease Mother      Heart Disease Father 57     Hypertension Maternal Grandmother      Substance Abuse Maternal Grandfather      Diabetes Maternal Grandfather      Diabetes Paternal Grandmother      Heart Disease Paternal Grandfather      Cancer Sister      Skin Cancer Sister      Substance Abuse Maternal Uncle      Substance Abuse Maternal Uncle      Suicide Niece      Suicide Niece      Anesthesia Reaction No family hx of      Thrombosis No family hx of        Social History:    Social History     Socioeconomic History     Marital status:      Spouse name: Albino     Number of children: 3     Years of education: 14   Occupational History     Occupation: unemployed   Tobacco Use     Smoking status: Never     Passive exposure: Never     Smokeless tobacco: Never   Vaping Use     Vaping Use: Never used    Substance and Sexual Activity     Alcohol use: Not Currently     Drug use: No     Comment: no herbal meds either     Sexual activity: Yes     Partners: Male     Birth control/protection: Post-menopausal     Comment: husb had vasectomy   Other Topics Concern      Service No     Blood Transfusions No     Caffeine Concern No     Comment: rarely drinks caffeine     Exercise Yes     Comment: does a lot of walking - 4x/week     Seat Belt Yes     Comment: always     Self-Exams Yes     Comment: SBE encouraged monthly     Parent/sibling w/ CABG, MI or angioplasty before 65F 55M? Yes   Social History Narrative    calcium - drinks 5-6 large glasses skim milk/day    flex sig/colonoscopy -at age 49    sun precautions - discussed    mammogram - needs every 2 years in her 30's, then yearly from then on    Td booster - 9/99 and 4/27/2010    pneumovax -at age 59    DEXA -when perimenopausal    stool hemoccults - every year after age 40    ASA- start at age 40    mulvitamin - encouraged     Social Determinants of Health     Financial Resource Strain: Low Risk  (9/15/2020)    Overall Financial Resource Strain (CARDIA)      Difficulty of Paying Living Expenses: Not very hard   Food Insecurity: No Food Insecurity (9/15/2020)    Hunger Vital Sign      Worried About Running Out of Food in the Last Year: Never true      Ran Out of Food in the Last Year: Never true   Transportation Needs: No Transportation Needs (9/15/2020)    PRAPARE - Transportation      Lack of Transportation (Medical): No      Lack of Transportation (Non-Medical): No   Physical Activity: Unknown (9/15/2020)    Exercise Vital Sign      Days of Exercise per Week: 0 days   Stress: Stress Concern Present (9/15/2020)    [employer] - Occupational Stress Questionnaire      Feeling of Stress : Rather much   Social Connections: Unknown (9/15/2020)    Social Connection and Isolation Panel [NHANES]      Frequency of Communication with  Friends and Family: More than three times a week      Frequency of Social Gatherings with Friends and Family: More than three times a week       Physical Exam:    CONSTITUTIONAL: healthy, alert and no distress.  PSYCHIATRIC: mentation appears normal and affect normal.  NEURO: Normal movements and speech.  EYES: No jaundice or pallor.  SKIN: No jaundice.   RESP: No audible cough or wheeze.      Laboratory Studies:    Lab Results   Component Value Date    HGB 14.0 07/07/2023    HGB 13.7 06/22/2021     Lab Results   Component Value Date     07/07/2023    PLT 97 06/22/2021     Lab Results   Component Value Date    WBC 4.7 07/07/2023    WBC 4.0 06/22/2021       Lab Results   Component Value Date    INR 1.16 07/07/2023    INR 1.28 10/27/2020       Lab Results   Component Value Date    PROTTOTAL 7.5 07/07/2023    PROTTOTAL 8.1 06/22/2021      Lab Results   Component Value Date    ALBUMIN 3.4 07/07/2023    ALBUMIN 3.3 10/03/2022    ALBUMIN 3.6 06/22/2021     Lab Results   Component Value Date    BILITOTAL 1.4 07/07/2023    BILITOTAL 1.8 06/22/2021     Lab Results   Component Value Date    BILICONJ 0.0 05/20/2014      Lab Results   Component Value Date    ALKPHOS 156 07/07/2023    ALKPHOS 104 06/22/2021     Lab Results   Component Value Date    AST 60 07/07/2023    AST 70 06/22/2021     Lab Results   Component Value Date    ALT 33 07/07/2023    ALT 52 06/22/2021       Lab Results   Component Value Date    CR 0.70 07/07/2023    CR 0.71 02/23/2021     Lab Results   Component Value Date    BUN 12.2 07/07/2023    BUN 12 11/11/2022    BUN 12 02/23/2021       AFP   7/7/23 = 81.0  5/17/23 = 54.6  3/29/23 = 149.0  2/14/23 = 271.0    Imaging:     I personally reviewed and interpreted her imaging as follows:    MRI abdomen 7/7/2023: Decrease in size of treated 7 lesion, measuring 2.2 x 1.6 cm, previously 3.3 x 2.9 cm.  There is still arterial enhancement and washout,    CT chest 7/7/2023: No evidence of lung metastases.   Borderline thoracic aneurysm measured 4.1 cm in the ascending aorta.      ASSESSMENT/PLAN:      Abiola Matute is a 58 year old female with a solitary hepatocellular carcinoma lesion in segment 7, now 5 months post radioembolization.  The lesion has decreased in size and AFP decreased from 271 to 54.6 initially, but recently has increased to 81.  There is persistent enhancing tumor in the treatment zone and therefore further treatment is warranted.    I discussed the imaging findings and lab findings with [place], and my recommendation for repeat treatment.  Since the lesion is now only 2.2 cm, ideally we would ablate the remnant.  The location and proximity of the diaphragm makes percutaneous ablation somewhat challenging and therefore I will ask my surgical oncology colleagues if they can perform it laparoscopically.  She is on our list for tumor conference discussion later today and we will discuss it at that time.  I will call her with the result of the discussion.    I also discussed with her that she has borderline/minimal dilatation of her ascending aorta and will need referral to cardiology for evaluation and surveillance.  We will make the referral accordingly. She expressed understanding and agreed with the plan.    Update:  We discussed [place] at our tumor conference today and consensus was that the remnant liver tumor will be amenable to laparoscopic ablation.  I will make the referral to surgical oncology accordingly and I called [place] and told her the plan.      It was a pleasure seeing the patient.     Signed,    Yarely Diaz M.D.    Interventional Radiology  Department of Radiology  [facility]  Patient Care Team:  Linda Macdonald APRN CNP as PCP - General (Family Medicine)  Diane Mckay RN as Specialty Care Coordinator (Gastroenterology)  Sonia Pacheco [facility] as Pharmacist (Pharmacist Ambulatory Care)  Linda Macdonald APRN CNP as Assigned  PCP  Jeannette Cervantes MD as MD (Gastroenterology)  Farhan Schilling MD as MD (Gastroenterology)  Farhan Schilling MD as Referring Physician (Gastroenterology)  Juan Crawford MD as MD (Dermatology)  Farhan Schilling MD as Assigned Gastroenterology Provider  Ezio Cazares MD as MD (Dermatology)  Ezio Cazares MD as MD (Dermatology)  Philly Gupta DO as Assigned Cancer Care Provider  Che Crockett, RN as Specialty Care Coordinator (Hematology & Oncology)  Patel Dumont as Assigned Behavioral Health Provider  Remi Leo RN as Registered Nurse (Gastroenterology)  Ezio Leal DPM as Assigned Surgical Provider  MAXIM Gilbert MD as Assigned Heart and Vascular Provider  Susanna Palencia PA-C as Physician Assistant (Dermatology)  SELF, REFERRED           40 minutes spent by me on the date of the encounter doing chart review, history and exam, imaging review, documentation and further activities per the note.      Video-Visit Details     Type of service:  Video Visit     Video Start and End Time:10:43 - 10:54am    Originating Location (pt. Location): Home     Distant Location (provider location):  [facility] [place]      Platform used for Video Visit: [employer]

## 2023-07-14 NOTE — LETTER
7/14/2023         RE: Abiola Matute  [address] [address]        Dear Colleague,    Thank you for referring your patient, Abiola Matute, to the [facility]. Please see a copy of my visit note below.          Interventional Radiology Clinic Visit    Date of this visit: 7/14/2023    Abiola Matute returns for follow up post radioembolization of HCC.    Primary Physician: Linda Macdonald        History Of Present Illness:    Abiola Matute is a 58 year old female who presents with diagnosis of unresectable hepatocellular carcinoma (HCC) on a background of cirrhosis and Crohn's disease, the latter in remission and predominantly perianal disease which is inactive.     In 12/2022, she was seen in the ED for chest pain, palpitations, and treated for cellulitis. CT chest at that time was negative for PE, but did show a new right hepatic lobe mass measuring ~3 cm. Subsequent MRI showed this was HCC. AFP was 271. She is followed by Dr. Gupta from oncology. She was not a surgical candidate due to the presence of portal hypertension so we performed selective Theraspheres radioembolization of the mass on 2/27/2023.  Follow-up imaging at 1 month showed decreased size of the lesion and AFP had decreased to 149 and then 54.  She presents for further follow-up today.    Today feels well. No symptoms of concern. No abdominal pain.  Previously reported headaches have subsided.    Review of Systems:    As above    Past Medical/Surgical History:    Past Medical History:   Diagnosis Date    Alcohol abuse     Alcoholic cirrhosis of liver with ascites     Anal fistula     Atypical ductal hyperplasia of breast 09/10/2010    ERT not recommended -left - and flat epithelial atypia-scheduled for breast biopsy 9/17/2010     Bifid uvula     Cholelithiasis     Contact perianal dermatitis and other eczema     recurrent - clobetasol     Crohn disease     Fear of flying      - gets  Ativan prn.     HCC (hepatocellular carcinoma) (H) 2/1/2023    Hypertension     IBS (irritable bowel syndrome)      Past Surgical History:   Procedure Laterality Date    BIOPSY ANAL N/A 3/28/2019    anal biopsy and culure placement of seton - Dr Fleming    BIOPSY BREAST Left 09/17/2010    - scheduled with Dr. Varma     BIOPSY LIVER  2019    COLONOSCOPY  2006    COLONOSCOPY N/A 12/23/2014    Procedure: COMBINED COLONOSCOPY, SINGLE OR MULTIPLE BIOPSY/POLYPECTOMY BY BIOPSY;  Surgeon: Diane Fleming MD;  Location: [facility]    COLONOSCOPY N/A 12/5/2019    Procedure: COLONOSCOPY, WITH POLYPECTOMY AND BIOPSY;  Surgeon: Farhan Schilling MD;  Location: [facility]    ENDOSCOPIC RETROGRADE CHOLANGIOPANCREATOGRAM N/A 4/24/2020    Procedure: ENDOSCOPIC RETROGRADE CHOLANGIOPANCREATOGRAPHY WITH, sledge removal,sphincterotomy, stent in gallbladder and pancreatic duct stent, and balloon dilation;  Surgeon: Guru Isac Kraft MD;  Location: [facility]    ESOPHAGOSCOPY, GASTROSCOPY, DUODENOSCOPY (EGD), COMBINED N/A 12/5/2019    Procedure: ESOPHAGOGASTRODUODENOSCOPY (EGD);  Surgeon: Farhan Schilling MD;  Location: [facility]    ESOPHAGOSCOPY, GASTROSCOPY, DUODENOSCOPY (EGD), COMBINED N/A 5/11/2023    Procedure: Esophagoscopy, gastroscopy, duodenoscopy (EGD), combined;  Surgeon: Jeannette Cervantes MD;  Location: [facility]    EXAM UNDER ANESTHESIA ANUS N/A 3/28/2019    Procedure: EXAM UNDER ANESTHESIA ANUS;  Surgeon: Diane Fleming MD;  Location:  OR    EXAM UNDER ANESTHESIA ANUS N/A 2/26/2021    Procedure: EXAM UNDER ANESTHESIA OF ANUS, SETON PLACEMENT, EXCISION OF SKIN BRIDGE;  Surgeon: Avtar Nam MD;  Location: [facility] OR    EXAM UNDER ANESTHESIA ANUS N/A 1/6/2023    Procedure: EXAM UNDER ANESTHESIA, ANUS, SETON EXCHANGE, partial fistulotomy;  Surgeon: Mary Dugan MD;  Location: [facility] OR    HYSTERECTOMY, VAGINAL  2006    with Dr. Licha Zhou - with BSO for fibroids     IR GALLBLADDER  DRAIN PLACEMENT  4/22/2020    IR SIRT (SELECTIVE INTERNAL RADIO THERAPY)  2/21/2023    IR VISCERAL ANGIOGRAM  2/21/2023    IR VISCERAL EMBOLIZATION  2/27/2023    OPEN REDUCTION INTERNAL FIXATION ANKLE Left at age 27    plates and screws removed at age 36    Pelviscopy with removal of bilateral hydrosalpinges.  04/15/2010    ZC APPENDECTOMY  at age 14       Current Medications:    Current Outpatient Medications   Medication Sig Dispense Refill    ammonium lactate (AMLACTIN) 12 % external cream Apply topically 2 times daily 385 g 3    clobetasol (TEMOVATE) 0.05 % external cream Apply to AA BID x 1-2 weeks then PRN. Do not apply to face. 60 g 3    furosemide (LASIX) 40 MG tablet TAKE 1 TABLET BY MOUTH  DAILY 90 tablet 3    inFLIXimab (REMICADE) 100 MG injection Inject into the vein once Next dose 12/16/22      Multiple Vitamins-Minerals (MULTIVITAMIN & MINERAL PO) Take by mouth every morning      spironolactone (ALDACTONE) 100 MG tablet Take 1 tablet (100 mg) by mouth every morning 90 tablet 3    triamcinolone (KENALOG) 0.1 % external cream Apply to AA BID x 1-2 week then PRN only 80 g 3       Allergies:    Fish oil, Metronidazole, Ppd [tuberculin purified protein derivative], Sulfa antibiotics, and Terbinafine and related    Family History:    Family History   Problem Relation Age of Onset    Breast Cancer Mother     Gastrointestinal Disease Mother     Heart Disease Father 57    Hypertension Maternal Grandmother     Substance Abuse Maternal Grandfather     Diabetes Maternal Grandfather     Diabetes Paternal Grandmother     Heart Disease Paternal Grandfather     Cancer Sister     Skin Cancer Sister     Substance Abuse Maternal Uncle     Substance Abuse Maternal Uncle     Suicide Niece     Suicide Niece     Anesthesia Reaction No family hx of     Thrombosis No family hx of        Social History:    Social History     Socioeconomic History    Marital status:      Spouse name: Albino    Number of children: 3     Years of education: 14   Occupational History    Occupation: unemployed   Tobacco Use    Smoking status: Never     Passive exposure: Never    Smokeless tobacco: Never   Vaping Use    Vaping Use: Never used   Substance and Sexual Activity    Alcohol use: Not Currently    Drug use: No     Comment: no herbal meds either    Sexual activity: Yes     Partners: Male     Birth control/protection: Post-menopausal     Comment: harper had vasectomy   Other Topics Concern     Service No    Blood Transfusions No    Caffeine Concern No     Comment: rarely drinks caffeine    Exercise Yes     Comment: does a lot of walking - 4x/week    Seat Belt Yes     Comment: always    Self-Exams Yes     Comment: SBE encouraged monthly    Parent/sibling w/ CABG, MI or angioplasty before 65F 55M? Yes   Social History Narrative    calcium - drinks 5-6 large glasses skim milk/day    flex sig/colonoscopy -at age 49    sun precautions - discussed    mammogram - needs every 2 years in her 30's, then yearly from then on    Td booster - 9/99 and 4/27/2010    pneumovax -at age 59    DEXA -when perimenopausal    stool hemoccults - every year after age 40    ASA- start at age 40    mulvitamin - encouraged     Social Determinants of Health     Financial Resource Strain: Low Risk  (9/15/2020)    Overall Financial Resource Strain (CARDIA)     Difficulty of Paying Living Expenses: Not very hard   Food Insecurity: No Food Insecurity (9/15/2020)    Hunger Vital Sign     Worried About Running Out of Food in the Last Year: Never true     Ran Out of Food in the Last Year: Never true   Transportation Needs: No Transportation Needs (9/15/2020)    PRAPARE - Transportation     Lack of Transportation (Medical): No     Lack of Transportation (Non-Medical): No   Physical Activity: Unknown (9/15/2020)    Exercise Vital Sign     Days of Exercise per Week: 0 days   Stress: Stress Concern Present (9/15/2020)    [employer] [employer] - Occupational  Stress Questionnaire     Feeling of Stress : Rather much   Social Connections: Unknown (9/15/2020)    Social Connection and Isolation Panel [NHANES]     Frequency of Communication with Friends and Family: More than three times a week     Frequency of Social Gatherings with Friends and Family: More than three times a week       Physical Exam:    CONSTITUTIONAL: healthy, alert and no distress.  PSYCHIATRIC: mentation appears normal and affect normal.  NEURO: Normal movements and speech.  EYES: No jaundice or pallor.  SKIN: No jaundice.   RESP: No audible cough or wheeze.      Laboratory Studies:    Lab Results   Component Value Date    HGB 14.0 07/07/2023    HGB 13.7 06/22/2021     Lab Results   Component Value Date     07/07/2023    PLT 97 06/22/2021     Lab Results   Component Value Date    WBC 4.7 07/07/2023    WBC 4.0 06/22/2021       Lab Results   Component Value Date    INR 1.16 07/07/2023    INR 1.28 10/27/2020       Lab Results   Component Value Date    PROTTOTAL 7.5 07/07/2023    PROTTOTAL 8.1 06/22/2021      Lab Results   Component Value Date    ALBUMIN 3.4 07/07/2023    ALBUMIN 3.3 10/03/2022    ALBUMIN 3.6 06/22/2021     Lab Results   Component Value Date    BILITOTAL 1.4 07/07/2023    BILITOTAL 1.8 06/22/2021     Lab Results   Component Value Date    BILICONJ 0.0 05/20/2014      Lab Results   Component Value Date    ALKPHOS 156 07/07/2023    ALKPHOS 104 06/22/2021     Lab Results   Component Value Date    AST 60 07/07/2023    AST 70 06/22/2021     Lab Results   Component Value Date    ALT 33 07/07/2023    ALT 52 06/22/2021       Lab Results   Component Value Date    CR 0.70 07/07/2023    CR 0.71 02/23/2021     Lab Results   Component Value Date    BUN 12.2 07/07/2023    BUN 12 11/11/2022    BUN 12 02/23/2021       AFP   7/7/23 = 81.0  5/17/23 = 54.6  3/29/23 = 149.0  2/14/23 = 271.0    Imaging:     I personally reviewed and interpreted her imaging as follows:    MRI abdomen 7/7/2023: Decrease in  size of treated 7 lesion, measuring 2.2 x 1.6 cm, previously 3.3 x 2.9 cm.  There is still arterial enhancement and washout,    CT chest 7/7/2023: No evidence of lung metastases.  Borderline thoracic aneurysm measured 4.1 cm in the ascending aorta.      ASSESSMENT/PLAN:      Abiola Matute is a 58 year old female with a solitary hepatocellular carcinoma lesion in segment 7, now 5 months post radioembolization.  The lesion has decreased in size and AFP decreased from 271 to 54.6 initially, but recently has increased to 81.  There is persistent enhancing tumor in the treatment zone and therefore further treatment is warranted.    I discussed the imaging findings and lab findings with Virginia, and my recommendation for repeat treatment.  Since the lesion is now only 2.2 cm, ideally we would ablate the remnant.  The location and proximity of the diaphragm makes percutaneous ablation somewhat challenging and therefore I will ask my surgical oncology colleagues if they can perform it laparoscopically.  She is on our list for tumor conference discussion later today and we will discuss it at that time.  I will call her with the result of the discussion.    I also discussed with her that she has borderline/minimal dilatation of her ascending aorta and will need referral to cardiology for evaluation and surveillance.  We will make the referral accordingly. She expressed understanding and agreed with the plan.    Update:  We discussed Virginia at our tumor conference today and consensus was that the remnant liver tumor will be amenable to laparoscopic ablation.  I will make the referral to surgical oncology accordingly and I called Virginia and told her the plan.      It was a pleasure seeing the patient.     Signed,    Yarely Diaz M.D.    Interventional Radiology  Department of Radiology  [facility]      CC  Patient Care Team:  Linda Macdonald APRN CNP as PCP - General (Family Medicine)  [facility],  Diane, RN as Specialty Care Coordinator (Gastroenterology)  Sonia Pacheco [facility] as Pharmacist (Pharmacist Ambulatory Care)  Linda Macdonald, APRN CNP as Assigned PCP  Jeannette Cervantes MD as MD (Gastroenterology)  Farhan Schilling MD as MD (Gastroenterology)  Farhan Schilling MD as Referring Physician (Gastroenterology)  Juan Crawford MD as MD (Dermatology)  Farhan Schilling MD as Assigned Gastroenterology Provider  Ezio Cazares MD as MD (Dermatology)  Ezio Cazares MD as MD (Dermatology)  Philly Gupta DO as Assigned Cancer Care Provider  Che Crockett, RN as Specialty Care Coordinator (Hematology & Oncology)  Patel Dumont as Assigned Behavioral Health Provider  Remi Leo RN as Registered Nurse (Gastroenterology)  Ezio Leal DPM as Assigned Surgical Provider  MAXIM Gilbert MD as Assigned Heart and Vascular Provider  Susanna Palencia PA-C as Physician Assistant (Dermatology)  SELF, REFERRED           40 minutes spent by me on the date of the encounter doing chart review, history and exam, imaging review, documentation and further activities per the note.      Video-Visit Details     Type of service:  Video Visit     Video Start and End Time:10:43 - 10:54am    Originating Location (pt. Location): Home     Distant Location (provider location):  [facility] [place]      Platform used for Video Visit: Chideo

## 2023-07-17 ENCOUNTER — DOCUMENTATION ONLY (OUTPATIENT)
Dept: TRANSPLANT | Facility: CLINIC | Age: 58
End: 2023-07-17
Payer: MEDICARE

## 2023-07-17 ENCOUNTER — PATIENT OUTREACH (OUTPATIENT)
Dept: SURGERY | Facility: CLINIC | Age: 58
End: 2023-07-17
Payer: MEDICARE

## 2023-07-17 ENCOUNTER — HOSPITAL ENCOUNTER (OUTPATIENT)
Dept: BEHAVIORAL HEALTH | Facility: CLINIC | Age: 58
Discharge: HOME | End: 2023-07-17
Attending: FAMILY MEDICINE
Payer: COMMERCIAL

## 2023-07-17 DIAGNOSIS — C22.0 HCC (HEPATOCELLULAR CARCINOMA) (H): Primary | ICD-10-CM

## 2023-07-17 PROCEDURE — H2035 A/D TX PROGRAM, PER HOUR: HCPCS | Mod: HQ

## 2023-07-17 NOTE — PROGRESS NOTES
July 17, 2023 8:17 AM - Fabio Antunez, RN:     [facility] LIVER TUMOR BOARD NOTE     DATE OF TUMOR BOARD: July 14, 2023      SCAN REVIEWED: 7/7/2023 MRI, Reviewed by Dr. Rush Frank    Discussion:   -AFP up to 55 from 2 months ago.  -5b lesion in segment 7, radioembolized - still enhancing, getting smaller, does appear to have viable tissue.  -Dr. KRISS Melendez recommends re-treatment.  -2.2 X 1.6 cm lesion    Plan:  -Proceed with Y90.

## 2023-07-17 NOTE — PROGRESS NOTES
[facility] Weekly Treatment Plan Review      Attestation:   Jonathan Juares MD - Medical Director - Provides oversight and supervision of care.       Date Span:Monday: 23 through 23      Date     Group Therapy 2 hours 1 hour in lieu of group 2 hours 0 hours 0 hours       Individual Therapy                 Family Therapy                 Psychoeducation                 Other (Specify)                  Client has no absences this week    Client has one excused absence this week , due to medical appts.     Program Running Totals: (No Phase 1 IOP due to this program being only OP level of care)  Total # of Phase 2 OP Group Sessions:17 for 34   Virginia gets 30 sessions for 60 hours at this time  Total # of Phase 3 OP Recovery Management Group Sessions: None    Total # of 1:1 Sessions:  1 hour BALWINDER   1 hour 5/30  7/3                                    Darinel , ,  (Diane)                    Weekly Treatment Plan Review     Treatment Plan initiated on: .    Dimension1: Acute Intoxication/Withdrawal Potential -   Previous Dimension Ratin  Current Dimension Ratin  Date of Last Use 22  Any reports of withdrawal symptoms - No      Dimension 2: Biomedical Conditions & Complications -   Previous Dimension Ratin  Current Dimension Ratin  Medical Concerns:wk of  She had appt with radiology on , continues to work with her team due to LT, cancer and rash.  Virginia told group she is dealing with some news, medically, but working with her Drs.  She said she isn't wanting to share a whole lot, until she gethers her information, but she did want group to know she has support and appreciates just being able to share what she did  wk 7/10Jasmyn reports rash and working with , also had CT and MRI related to ongoing care for cancer and need for liver.  wk of 7/3  I get an infusion 1x per month and usually by week  "3, I feel more sore and more tired, but I am grateful for the infusions.  \"no concerns beyond ongoing liver cancer and Crohns\"  wk of  She saw Jazmin Barrios at Urgent care for skin issues.  She also said she met with her GI practioner  She reports having liver cancer and Crohn's disease.  \"right now I am doing pretty well  Outside medical appointments this week (list provider and reason for visit)wk of  She had appt with radiology, Dr. Shah,on , continues to work with her team due to LT, cancer and rash  : wk of  she saw Susanna RAZA for rash on leg.  Had a PeTH test , see below. No issues  :  she saw Jazmin Meng PA-C for a flare up of her psoriasis likely related to her other medical issues, per client     Current Medications & Medication Changes:  Current Outpatient Medications   Medication     ammonium lactate (AMLACTIN) 12 % external cream     clobetasol (TEMOVATE) 0.05 % external cream     furosemide (LASIX) 40 MG tablet     inFLIXimab (REMICADE) 100 MG injection     Multiple Vitamins-Minerals (MULTIVITAMIN & MINERAL PO)     spironolactone (ALDACTONE) 100 MG tablet     triamcinolone (KENALOG) 0.1 % external cream     No current facility-administered medications for this encounter.     Facility-Administered Medications Ordered in Other Encounters   Medication     BREEZA Lemon-Lime flavored oral liquid for Neutral Abdominal/Pelvic Imaging 1,000 mL     hyoscyamine (LEVSIN/SL) sublingual tablet 125 mcg     Medication Prescriber:  Linda Macdonald  Taking meds as prescribed? Yes  Medication side effects or concerns:  no        Dimension 3: Emotional/Behavioral Conditions & Complications -   Previous Dimension Ratin  Current Dimension Ratin  PHQ2:       2023     1:34 PM 2023    10:00 AM 3/28/2023    11:21 AM 3/26/2023     1:45 PM 2023     9:35 AM 1/3/2023     2:46 PM 2022    11:02 AM   PHQ-2 (  [employer])   Q1: Little interest or pleasure in doing things 0 " 0 0 0 0 0 0   Q2: Feeling down, depressed or hopeless 0 0 0 0 1 0 0   PHQ-2 Score 0 0 0 0 1 0 0   Q1: Little interest or pleasure in doing things   Not at all  Not at all Not at all Not at all   Q2: Feeling down, depressed or hopeless   Not at all  Several days Not at all Not at all   PHQ-2 Score   0  1 0 0      GAD2:       2/16/2023     9:54 AM 5/25/2023    10:00 AM   TAINA-2   Feeling nervous, anxious, or on edge 1 1   Not being able to stop or control worrying 1 0   TAINA-2 Total Score 2 1     PROMIS 10-Global Health (all questions and answers displayed):       2/16/2023     9:58 AM 2/22/2023     7:32 PM 5/25/2023    10:00 AM   PROMIS 10   In general, would you say your health is: Good Good    In general, would you say your quality of life is: Good Good    In general, how would you rate your physical health? Fair Fair    In general, how would you rate your mental health, including your mood and your ability to think? Good Good    In general, how would you rate your satisfaction with your social activities and relationships? Good Good    In general, please rate how well you carry out your usual social activities and roles Good Good    To what extent are you able to carry out your everyday physical activities such as walking, climbing stairs, carrying groceries, or moving a chair? Completely Completely    In the past 7 days, how often have you been bothered by emotional problems such as feeling anxious, depressed, or irritable? Sometimes Rarely    In the past 7 days, how would you rate your fatigue on average? Mild Mild    In the past 7 days, how would you rate your pain on average, where 0 means no pain, and 10 means worst imaginable pain? 3 3    In general, would you say your health is: 3 3 3   In general, would you say your quality of life is: 3 3 4   In general, how would you rate your physical health? 2 2 3   In general, how would you rate your mental health, including your mood and your ability to think? 3 3 4  "  In general, how would you rate your satisfaction with your social activities and relationships? 3 3 5   In general, please rate how well you carry out your usual social activities and roles. (This includes activities at home, at work and in your community, and responsibilities as a parent, child, spouse, employee, friend, etc.) 3 3 5   To what extent are you able to carry out your everyday physical activities such as walking, climbing stairs, carrying groceries, or moving a chair? 5 5 4   In the past 7 days, how often have you been bothered by emotional problems such as feeling anxious, depressed, or irritable? 3 2 2   In the past 7 days, how would you rate your fatigue on average? 2 2 2   In the past 7 days, how would you rate your pain on average, where 0 means no pain, and 10 means worst imaginable pain? 3 3 4   Global Mental Health Score 12 13 17   Global Physical Health Score 15 15 14   PROMIS TOTAL - SUBSCORES 27 28 31     Mental health diagnosis none  Date of last SIB:  never  Date of  last SI:  never  Date of last HI: never  Behavioral Targets:  see tx plan  Risk factors:  Client dealing with liver disease/cancer and Crohns, client needs a new liver,   Protective factors:  spirituality, forward/future oriented thinking, safe and stable environment, regular physical activity, living with other people and structured day  Current MH Assignments:  see tx plan      Narrative: wk of 7/17: no thoughts of self harm or concerns\" on likert scale 1-low and 10 -high:   Anxiety:  5  stress: 5 depression 1  issues to discuss and Helpful coping:  All related to my medical, but I have great medical team and family support.  Client said of the reading:I like the ongoing discussions of shame and this reading brought me awareness that I have some to deal with regarding the nature of why many get liver cancer.       wk of 7/10:  She was able to repeat the steps of 5 senses, as a review.   \"no thoughts of self harm or " "concerns\".  Clients feedback to group member  on likert scale 1-low and 10 -high:   Anxiety:  2  stress: 2  depression 2  issues to discuss and Helpful coping: talking with you guys.  I had a craving that was pretty big this week.  Psychoeducation/Skills The thinking-feeling connection. This topic will address information from the [place] for Clinical Interventions about how our thoughts influence our feelings.  Client was able to define automatic thoughts and make a connection with some that she has.   She said that she has been continueing to gain awareness of her beliefs vs perceptions vs what is a thought and what is a feeling.  Discussed that reminding herself it is ok to have thoughts and feelings, but they are not facts for other people.    Client participated in lovingkindness meditation and said it was just what she needed today and it that it was helpful and grounding.  \"Thank you for doing it\"    wk of 7/3: Clients participated in 5 senses psychoeducation discussion, participated in guided practice and was able to name 5 parts, and said she feels it will be a helpful tool  \"no thoughts of self harm or concerns\".   I discussed the research on gratitude journals and expressed she has seemed like she has an \"attitude of gratitude\".  She said she feels blessed and hopes she does.  on likert scale 1-low and 10 -high:   Anxiety:  0  stress: 0 depression 0  issues to discuss and Helpful coping: I am learning to say no, and that boundaries are good    wk of 6/26  Attended MH skills group with Darinel Au.    She participated in psychoeducation on Core beliefsThis topic will discuss core beliefs, as a lens through which we see life and how that affects our choices.  Virginia said the discussion on core beliefs is something she will spend some time on, as the awareness is helpful.  I told her we will follow up on Wednesday with more that can be helpful when we notice harmful core beliefs.   \"no thoughts of self " "harm or concerns\"  on likert scale 1-low and 10 -high:   Anxiety:  1 stress: 1 depression 0  issues to discuss and Helpful coping: learning about self, anxiety and stress  wk of 6/19 \"no thoughts of self harm or no concerns\".  She reports on likert scale 1-low and 10 -high:   Anxiety:  1  stress: 1 depression 0  issues to discuss and Helpful coping: getting to know people better in group, hearing other's helpful coping.  Attended MH skills group with Darinel Au.  We discussed anticipating some situations and \"gearing up\" by practicing deep breathing. I reminded client of previous discussions where beliefs often guide how we feel about certain situations, but that we can reframe them, or look at them for the particular situation.      Wk of 6/12  I am feeling much better and less anxiety about group attendance as I continue to attend and get to know others. \"no thoughts of self harm or concerns\". On likert scale 1-low and 10 -high:   Anxiety:  1  stress: 1 depression 1  issues to discuss and Helpful coping:  Breathing, getting to others in group  Wk of 6/6 \"no thoughts of self harm\"  Attended MH skills group with Darinel Au  Week of 5/31/23 :Client reports feeling anxious due to first treatment experience, not knowing what to expect, her  Mother's declining health-needing a placement for higher level of care, spouse recently lost his job-need to obtain COBRA insurance and unable to care for grandchildren because of attending treatment. Client was saw Traci Aguilar and has not seen them in about 6 weeks. To follow up with primary counselor re: seeing psychotherapist or return to [facility].  Client rates the following on a scale of 1 (low) to 10 (high):  Anxiety- 5  Depression -1  Stress- 5 as a result of above concerns.   Client participated in psychotherapy/education on self-care, introduction to relaxation meditation-tensing and relaxing muscles, and sleep hygiene.  Current Mental Health " "symptoms include: . Active interventions to stabilize mental health symptoms this week :   At SI her PHQ-2 score was 0 and his TAINA-2 score was 1.  PROMISE 10 was  31:  She reports that at this time she is dealing with her mother's living situation and health issues. She reports that her father has passed away and that she is estranged from 1 sister.         Dimension 4: Treatment Acceptance / Resistance -   Previous Dimension Ratin  Current Dimension Ratin  [facility] Diagnosis:  Alcohol Use Disorder   303.90 (F10.20) Severe In a controlled environment  Commitment to tx process/Stage of change- contemplation  [facility] assignments - see tx plan      Narrative - wk of :  on likert scale 1-low and 10 -high:  motivation for sobriety: 10+  wk of 7/10  My motivation for sobriety is 10/10\".  I think it is so important to attend and be dedicated to support in this group, and to \"say things outloud, so it takes the power away, as you say, Alana\"  wk of 7/3  on likert scale 1-low and 10 -high:  motivation for sobriety 10.  Virginia said  session really stuck with her and she thought about a lot of things and is really understanding why attendance and building on the days and lessons is important.     wk of   sober support motivation:  2, but I think motivation will grow as I keep settling in and I am motivated 10/10 for sobreity.    Wk of :  Client reports motivation for sobriety is 10 (high) Motivation for attending community support 5 (on scale 1 low-10 high).   Virginia expressed to her group how helpful they are to her and how much she appreciates all that members shared about their stories.  She said she gains such helpful insight for self   Week of  Virginia discussed that she has been late a couple times, but realized with 2 other members gone, how this speaks to the importance of being on time and valuing and respecting others time, too.   She will also miss tomorrows group due to a long awaited medical " "appt. Dim 4: on likert scale 1-low and 10 -high:  Motivation for sobriety:  10  motivation for sober support attendance: not doing yet  Wk of   Virginia also said she finds motivation for sobriety in her family and friends, \"but really deep down need to do this for myself\"  Told counselor and group how she didn't think she would get much out of tx and already is getting so much and is grateful for older adult group   Week of 23: Client reports motivation for sobriety is 10 (high) Motivation for attending community support 5 (on scale 1 low-10 high).  The patient is motivated, to explore treatment.  She said \"of course I am partly here to fulfill requirements for liver transplant, but I want to learn healthy coping for lifelong sobriety.      Dimension 5: Relapse / Continued Problem Potential -   Previous Dimension Ratin  Current Dimension Ratin  Relapses this week - None  Urges to use - None  UA results -   No results found for this or any previous visit (from the past 168 hour(s)).  Using ReSet Clem: N/A    Narrative-  wk of :  cravings, warning signs, triggers:i went to a musical festival, had a few cravings, but also had insight when watching others use, \"glad it isn't me\"  Wk of 7/10 Had a PeTH test , see above. No issues. She discussed the Serenity Prayer and that she finds it helpful.  She also discussed having cravings at a 4 of 10 when cooking, and what she found helpful:  Processed, talked with , talk with group today about it.   Virginia said she really had quite a learning week with events in group, discussions and assignments.  \"My heart goes out to the new person the other day, but also I had some anger about all the disruptions and negativity from her\"  \"I am ready to move on, and learn what I learned, and know that could be any one of us\"  wk of   cravings, warning signs, triggers: none.  My adult kids are caring and asked if I thought I would be OK going to [place] " "[place], as there is drinking there.  She told them she appreciates the concern, but feels with them and little to no triggers, for quite some time, she didn't feel it was a problem.  Virginia said she had a wonderful time with her family and is not concerned about use over 4th of July holiday either, she has a good plan for spending time with family and focusing on good food.   wk of 6/26  Dim 5: cravings, warning signs, triggers: none.  Wed reading was focused blame.  It said blaming is hiding, that when we blame others we try to hide our character defects.  We discussed the meaning of character defects. \"I was in big denial for a long time, it took being told I would need a new liver for me to really realize what drinking was doing. \"it hit me like a ton of bricks when I finally really heard it\"  wk of 6/19  She participated in  Psychoeducation/Skills: Self-Care.  Client s completed self-care assessment and described self-care as feeling better physically. Client identified physical self-care they do well- attending medical appointments, one they do poorly or needs improvement- eating healthy food and barriers that prevent themselves from self-care - cost of food is expensive, the shopping and prep.  This topic will give a general overview of self-care: physical, environmental, emotional, social and spiritual. It will explore importance of self-care and the impact on recovery.   Wk of 6/12. Dim 5: warning signs, triggers and cravings: none   helpful coping: attending group. Examples of benefits of drinking or drug use, or other behaviors Physical, Social mental or emotional: confidence, escape from reality, \"friends\"     Main drawbacks connected to these: not real friends that only want to drink together.  Headaches and slurred speech.  Remoarse  Benefits connected to abstinence from addictive behaviors:more able to do me and be me, I enjoy trud friends, I remember things better and will have better memories      " "Drawbacks you see connected with abstinence from these:  Wk of she participated in the daily reading and said she liked it.  Virginia also gave meaningful feedback to client who discussed use episode: \"it could be anyone of us\"  She said this is important and she is learning from others sharing.  Week of 23: Client denies riggers or warning signs this past week. She said she liked the saying \"it is just as easy to create good habits as bad ones\"   Patient reports family members and her  are concerned about their substance use.  Patient reports their recovery goals are \"abstinence forever.\"  She has had no previous detoxes or treatments and needs to gain insight into healthy coping for relapse prevention.      Dimension 6: Recovery Environment -   Previous Dimension Ratin  Current Dimension Ratin  Family Involvement - not at this time  Summarize attendance at family groups and family sessions - NA  Family supportive of treatment?  Yes    Community support group attendance - no  Recreational activities - some camping, cooking and boating    Narrative -wk of :  sober support motivation: this group and my family.     wk of 7/10: \"I am motivated for outside support at 2/10, but when I get done with Tuesday group, I think my motivtiaon will be higher\"  wk of    We discussed \"The power of Solitude\" and how spending time alone with ourselves may not be easy or even desirable, it it is key to getting to know self.  I emphasized that studies tell us we need each other to survive and be happy, but also when we loose the ability to be alone with ourselves, sometimes our overstimulated nervous systems suffer from no place to rest and recharge.  She said this is good to discuss, as she may even consider doing it a bit more, as it \"really does recharge me\" She said she is pretty good about finding alone time and also uses journaling.  I asked client how this applies to recovery and treatment:She said " "she will often take a topic from group, or a question and journal about.  She said it really helps   Wk of 6/26. She said sober support motivation is  2, but I think motivation will grow as I keep settling in  I talked with clients about taking responsibility for our actions, so we can be empowered and own our life and also change if we need to. We discussed resources including AA, SMART Recovery.  I encouraged clients to start exploring meetings.  Wk of 6/19.  Virginia reports with having group 3x per week, it is a good deal of support, \" I will continue to learn about peer support\"  wk of 6/12client discussed resources sober support, including AA, Liver Transplant support and [place] recovery connection  wk of 6/6  goals for the wknd: look into volunteering at [facility] for writing card.  \"no community support attendance at this time\"   Client is not currently attending community sup[ort and does not have a sponsor. Client's supportive people are her family. Client shared she has friends and has not talked with many about her current situation, because \"I am a private person,\" (treatment and needing transplant).  Patient reports they are involved in community of holden activities    They reports spirituality impacts recovery in the following ways:  \"There was some changes as far as .  We do belong to a [employer] but it's been a number of years since we've been.\"  Patient believes her spirituality and sobriety are connected. Patient reported having 3 children, all adults.  Patient has 2 grandchildren.    She lives with her spouse and son. recreational/leisure activities: camping, cooking, boating, playing board games and card games.  Patient is currently disabled due to her Crohn's Disease.  Patient reports their income is obtained through SSDI disability; spouse.  Patient does not identify finances as a current stressor.         Progress made on transition planning goals: just began tx    Justification for Continued Treatment " at this Level of Care:  No previous tx, needs 6 months of sobriety to be eligible for a transplant, but is having some medical issues that she ocontinues to work on with her medical teamneeds to learn long term relapse prevention, she reports finding that attending group is helpful for her and she is learning.She said she did not think she would get much out of this but is getting so much support and understanding of healthy coping.  She is not attending community support, at this time.  She is working on goals and assignments.  She gets regular PEth  tests, and there have been no indications of alcohol use.    Treatment coordination activities this week:7/18  Discussed client info with margaret Singh   Discussed client info with margaret Singh  Need for peer recovery support referral? No    Discharge Planning: Not at this time      Has vulnerable adult status change? No    Interdisciplinary Clinical Supervision including: no    Are Treatment Plan goals/objectives effective? Yes  *If no, list changes to treatment plan:    Are the current goals meeting client's needs? Yes  *If no, list the changes to treatment plan.  Plan:  Continue per Master Treatment Plan    *Client agrees with any changes to the treatment plan: Yes  *Client received copy of changes: Yes  *Client is aware of right to access a treatment plan review: Yes     Staff Members Contributing To Weekly Review:   SOLANGE Reese, MS, LADRHEA, LPC  Lead Counselor Adult [facility] IOP/OP Programs Darinel Au, DD, LMFT, SOLANGE, CGTP-[place] Licensed MICD Psychotherapist

## 2023-07-17 NOTE — GROUP NOTE
Group Therapy Documentation    PATIENT'S NAME: Abiola Matute  MRN:   [id number]  :   1965  ACCT. NUMBER: [id number]  DATE OF SERVICE: 23  START TIME: 12:00 PM  END TIME:  2:00 PM  FACILITATOR(S): Judi Dodd LADC  TOPIC: BEH Group Therapy  Number of patients attending the group: 3  Group Length:  2 Hours    Group Therapy Type: Addiction and Psychoeducation    Summary of Group / Topics Discussed:    Balanced Lifestyle , Forgiveness, and shame.      Today a new client started and she discussed what brought her to tx.  Group welcomed her.  Client's discussed the reading on the relief one can feel when not stuck in the cycle of addiction.  We discussed that many who don't  understand addiction do not understand it is a disease and often contribute to shame.  We discussed shame and how to begin healing it.  Clients all concurred this is a big and ongoing process to understand shame and deal with it.  I discussed healing shame and that awareness is the key.  I briefly discussed how it can turn toxic. We discussed other things that cause clients to have shame. Clients did check in on all dimensions.  The new group member discussed her last use and group processed this and we discussed plan for structure for each client for the next few days.  Clients all discussed what they have learned about themselves or recovery this last week.    Attestation: Chris Juares MD - Medical Director - Provides oversight and supervision of care.       Group Attendance:  Attended group session    Patient's response to the group topic/interactions:  cooperative with task, discussed personal experience with topic and expressed readiness to alter behaviors    Patient appeared to be Actively participating and Attentive.          Client specific details: Virginia told group she is dealing with some news, medically, but working with her Drs.  She said she isn't wanting to share a whole lot, until she gethers her information,  "but she did want group to know she has support and appreciates just being able to share what she did.   Today's session focussed on check in of all dimensions, dim 2 medical,  dim 6 plan of structure  Client said of the reading:I like the ongoing discussions of shame and this reading brought me awareness that I have some to deal with regarding the nature of why many get liver cancer.    Clients feedback to group member:  Keep coming back, so glad you are here. \"I too had anxiety about walking into the room, but it is the best thing I could have done!\"    Dim1: ASHISH: no change reported  Dim2: medical issues or appts:see above  Dim 3: \"no thoughts of self harm or concerns\"  on likert scale 1-low and 10 -high:   Anxiety:  5  stress: 5 depression 1  issues to discuss and Helpful coping:  All related to my medical, but I have great medical team and family support  Dim 4 on likert scale 1-low and 10 -high:  motivation for sobriety: 10+  Dim 5: cravings, warning signs, triggers:i went to a musical festival, had a few cravings, but also had insight when watching others use, \"glad it isn't me\"  Dim 6 sober support motivation: this group and my family.      What I have learned about myself or my recovery this last week. \"roll with punches and figure out plans of structure or things that could be hard'  Plan of structure includes: support from my  and medical team, doing group this week,  Keeping a positive attitude.    P).  Follow plan of structure.  Notice attitude and make choices when feeling negative    Attestation:   Jonathan Juares MD - Medical Director - Provides oversight and supervision of care.     Judi Dodd, MS, LADC, LPC                                                     "

## 2023-07-17 NOTE — PROGRESS NOTES
"Hello Team,  Please fax therapy plan for Remicade and lab orders for predictr pK to [facility] (f) [phone].    Please put on the cover sheet \"Please draw predictr pK lab prior to next infusion scheduled around 8/3/23.\"    Please call [phone]  Shantal to confirm receipt.    Thank you.  Remi"

## 2023-07-17 NOTE — PROGRESS NOTES
New Patient Oncology Nurse Navigator Note     Referring provider: Dr. Blankenship     Referring Clinic/Organization: [facility]     Referred to: Surgical Oncology -  Hepatobiliary / GI Cancers     Requested provider (if applicable): First available provider    Referral Received: 07/17/23       Evaluation for : Hepatocellular Cancer   - discuss ablation of residual tumor      Clinical History (per Nurse review of records provided):      See book marked documents:       Referring MD office note    Pathology report    Imaging reports     Procedure report       Allergies   Allergen Reactions     Fish Oil      Redness and itching around eye area only - went away when fish oil capsules stopped      Metronidazole      pain/itching     Ppd [Tuberculin Purified Protein Derivative]      Sulfa Antibiotics      hives     Terbinafine And Related      Lamisil = mild urticarial reaction        Past Medical History:   Diagnosis Date     Alcohol abuse      Alcoholic cirrhosis of liver with ascites      Anal fistula      Atypical ductal hyperplasia of breast 09/10/2010    ERT not recommended -left - and flat epithelial atypia-scheduled for breast biopsy 9/17/2010      Bifid uvula      Cholelithiasis      Contact perianal dermatitis and other eczema     recurrent - clobetasol      Crohn disease      Fear of flying      - gets Ativan prn.      HCC (hepatocellular carcinoma) (H) 2/1/2023     Hypertension      IBS (irritable bowel syndrome)        Past Surgical History:   Procedure Laterality Date     BIOPSY ANAL N/A 3/28/2019    anal biopsy and culure placement of seton - Dr Fleming     BIOPSY BREAST Left 09/17/2010    - scheduled with Dr. Varma      BIOPSY LIVER  2019     COLONOSCOPY  2006     COLONOSCOPY N/A 12/23/2014    Procedure: COMBINED COLONOSCOPY, SINGLE OR MULTIPLE BIOPSY/POLYPECTOMY BY BIOPSY;  Surgeon: Diane Fleming MD;  Location:  GI     COLONOSCOPY N/A 12/5/2019    Procedure: COLONOSCOPY, WITH POLYPECTOMY  AND BIOPSY;  Surgeon: Farhan Schilling MD;  Location: [facility] GI     ENDOSCOPIC RETROGRADE CHOLANGIOPANCREATOGRAM N/A 4/24/2020    Procedure: ENDOSCOPIC RETROGRADE CHOLANGIOPANCREATOGRAPHY WITH, sledge removal,sphincterotomy, stent in gallbladder and pancreatic duct stent, and balloon dilation;  Surgeon: Guru Isac Kraft MD;  Location: [facility] OR     ESOPHAGOSCOPY, GASTROSCOPY, DUODENOSCOPY (EGD), COMBINED N/A 12/5/2019    Procedure: ESOPHAGOGASTRODUODENOSCOPY (EGD);  Surgeon: Farhan Schilling MD;  Location: [facility] GI     ESOPHAGOSCOPY, GASTROSCOPY, DUODENOSCOPY (EGD), COMBINED N/A 5/11/2023    Procedure: Esophagoscopy, gastroscopy, duodenoscopy (EGD), combined;  Surgeon: Jeannette Cervantes MD;  Location: [facility] GI     EXAM UNDER ANESTHESIA ANUS N/A 3/28/2019    Procedure: EXAM UNDER ANESTHESIA ANUS;  Surgeon: Diane Fleming MD;  Location:  OR     EXAM UNDER ANESTHESIA ANUS N/A 2/26/2021    Procedure: EXAM UNDER ANESTHESIA OF ANUS, SETON PLACEMENT, EXCISION OF SKIN BRIDGE;  Surgeon: Avtar Nam MD;  [place] [facility] [place]     EXAM UNDER ANESTHESIA ANUS N/A 1/6/2023    Procedure: EXAM UNDER ANESTHESIA, ANUS, SETON EXCHANGE, partial fistulotomy;  Surgeon: Mary Dugan MD;  Location: [facility] OR     HYSTERECTOMY, VAGINAL  2006    with Dr. Licha Zhou - with BSO for fibroids      IR GALLBLADDER DRAIN PLACEMENT  4/22/2020     IR SIRT (SELECTIVE INTERNAL RADIO THERAPY)  2/21/2023     IR VISCERAL ANGIOGRAM  2/21/2023     IR VISCERAL EMBOLIZATION  2/27/2023     OPEN REDUCTION INTERNAL FIXATION ANKLE Left at age 27    plates and screws removed at age 36     Pelviscopy with removal of bilateral hydrosalpinges.  04/15/2010     ZZC APPENDECTOMY  at age 14       Current Outpatient Medications   Medication Sig Dispense Refill     ammonium lactate (AMLACTIN) 12 % external cream Apply topically 2 times daily 385 g 3     clobetasol (TEMOVATE) 0.05 % external cream Apply to AA BID x 1-2 weeks  then PRN. Do not apply to face. 60 g 3     furosemide (LASIX) 40 MG tablet TAKE 1 TABLET BY MOUTH  DAILY 90 tablet 3     inFLIXimab (REMICADE) 100 MG injection Inject into the vein once Next dose 12/16/22       Multiple Vitamins-Minerals (MULTIVITAMIN & MINERAL PO) Take by mouth every morning       spironolactone (ALDACTONE) 100 MG tablet Take 1 tablet (100 mg) by mouth every morning 90 tablet 3     triamcinolone (KENALOG) 0.1 % external cream Apply to AA BID x 1-2 week then PRN only 80 g 3        Patient Active Problem List   Diagnosis     Non morbid obesity due to excess calories     Other isolated or specific phobias     Other congenital malformations of mouth     Contact dermatitis and other eczema, due to unspecified cause     Intestinal infection due to Clostridium difficile     Iron deficiency anemia, unspecified iron deficiency anemia type     Rosacea     Atypical ductal hyperplasia of left breast     Idiopathic thrombocytopenia (H)     Postmenopausal- s/p hysterectomy with BSO - not on HRT secondary to atypical ductal hyperplasia & breast pain -no paps needed     Claustrophobia     S/P total hysterectomy and bilateral salpingo-oophorectomy     Abnormal liver enzymes- ? related to ongoing etoh use/abuse     Essential hypertension with goal blood pressure less than 140/90     Gastroenteritis     Stool incontinence     CARDIOVASCULAR SCREENING; LDL GOAL LESS THAN 130     AA (alcohol abuse) - ? ongoing      Alcoholic fatty liver     Acquired hyperbilirubinemia- ? secondary to alcohol use     Diffuse nodular cirrhosis of liver (H)     Severe Perianal Crohn's Disease     Biliary colic     Cholecystitis     Alcoholic cirrhosis of liver with ascites (H) - seeing [facility]      Abscess of anal or rectal region     Anal fistula     HCC (hepatocellular carcinoma) (H)     Alcohol use disorder, moderate, dependence (H)     Crohn's disease of large intestine with fistula (H)         Clinical Assessment / Barriers to Care  (Per Nurse):    None at this time.     Records Location:     [facility]     Records Needed:     NONE AT THIS TIME      Additional testing needed prior to consult:     NONE AT THIS TIME    Referral updates and Plan:       Consult with Surgical Oncology     07/17/2023 11:39 AM - Called and spoke with patient regarding scheduling consult with liver surgeon, she was aware of plan and confirmed appointment info. She was transferred to scheduling to finalized appointment details.     Oneida Rowland, RN, BSN   Surgical Oncology New Patient Nurse Navigator  [facility]  [phone]

## 2023-07-18 ENCOUNTER — TELEPHONE (OUTPATIENT)
Dept: BEHAVIORAL HEALTH | Facility: CLINIC | Age: 58
End: 2023-07-18

## 2023-07-18 ENCOUNTER — TELEPHONE (OUTPATIENT)
Dept: CARDIOLOGY | Facility: CLINIC | Age: 58
End: 2023-07-18

## 2023-07-18 ENCOUNTER — HOSPITAL ENCOUNTER (OUTPATIENT)
Dept: BEHAVIORAL HEALTH | Facility: CLINIC | Age: 58
Discharge: HOME | End: 2023-07-18
Attending: FAMILY MEDICINE
Payer: COMMERCIAL

## 2023-07-18 PROCEDURE — H2035 A/D TX PROGRAM, PER HOUR: HCPCS

## 2023-07-18 NOTE — TELEPHONE ENCOUNTER
----- Message from SOLANGE Grimaldo sent at 7/18/2023 12:17 PM CDT -----  Regarding: individual session  Scheduling Request                         individual session    Patient Name:STEVEN MCNAMARA [[id number]]  Location of programming: [facility]  Group: [id number] on Monday, Tuesday, and Wednesday, at 12:00 pm: PM to 2:00 pm  Individual Appt (SI)    Date:   7/19     Time:  11:15    Provider: Dr. Juares,     Number of visits:    1    Length of appt: 60 minutes  Visit type:  in person        Billing Type: part of program    Thank You,  Alana Dodd M.S., SOLANGE, LPC  Lead Counselor  [phone]

## 2023-07-18 NOTE — PROGRESS NOTES
Printed Remicade therapy plan and lab order for predictr pk and faxed to [facility] at [phone].    Spoke to Shantal and she confirmed that she received fax.

## 2023-07-18 NOTE — TELEPHONE ENCOUNTER
[facility] Call Center    Phone Message    May a detailed message be left on voicemail: yes     Reason for Call: Other: Pt requesting a call back to assist in scheduling an appointment for Ascending aorta dilation at either [facility] or [facility] whichever has soonest available.      Action Taken: Other: Cardiology    Travel Screening: Not Applicable     Thank you!  [facility]

## 2023-07-19 ENCOUNTER — HOSPITAL ENCOUNTER (OUTPATIENT)
Dept: BEHAVIORAL HEALTH | Facility: CLINIC | Age: 58
Discharge: HOME | End: 2023-07-19
Attending: FAMILY MEDICINE
Payer: COMMERCIAL

## 2023-07-19 PROCEDURE — H2035 A/D TX PROGRAM, PER HOUR: HCPCS

## 2023-07-19 PROCEDURE — H2035 A/D TX PROGRAM, PER HOUR: HCPCS | Mod: HQ

## 2023-07-19 NOTE — GROUP NOTE
"Group Therapy Documentation    PATIENT'S NAME: Abiola Matute  MRN:   [id number]  :   1965  ACCT. NUMBER: [id number]  DATE OF SERVICE: 23  START TIME: 12:00 PM  END TIME:  2:00 PM  FACILITATOR(S): Judi Dodd LADC  TOPIC: BEH Group Therapy  Number of patients attending the group:  3  Group Length:  2 Hours    Group Therapy Type: Addiction and Psychoeducation    Summary of Group / Topics Discussed:    Coping Skills/Lifestyle Managemet, Choices in Recovery, Resentments, Time Management, and Addiction and the brain      Attestation:   Jonathan Juares MD - Medical Director - Provides oversight and supervision of care.     Today in group clients read the opening reading and discussed it.  This reading was about the child within.  It said often we think of the wounded child who is afraid and inadequate or left out, but that child is also wise and strong.  I talked with clients about how this ties into shame, which has come up a few times in group the last few weeks. We discussed ongoing awareness of shame and how to continue to heal it with our lives and look at its connection to addiction.  We can give the child within comfort and also remind it of its wisdom and resiliency.   Group also read \"these are the reasons I drank\" and discussed the ones that fit for them.  I tied this to them discussing their top 5 triggers they have and healthy coping assignment.  Clients participated in \"The science of addiction\" psychoeducation    The Science of Addiction  This topic will give a general overview of the brain chemistry of addiction and how the brain is \"hijacked\"    Objective(s):    Client will be able to define addiction    Client will identify basic brain regions involved with addiction.    Client will identify the relationship of addiction to genetics and to brain chemistry  Structure:    Discuss general overview of the Reward Pathway/Reward system    Provide definition of addiction and Disease " "    Provide psychoeducation on neurotransmitters and brain regions involved in addiction to illustrate how frontal cortex executive functioning is diminished by substance use disorder..    Facilitate group discussion around each patient's current beliefs of whether addiction is a disease or a moral failing and how that may have changed.    Facilitate group discussion around each patient's current symptomology.    Discuss 3 possible reasons why some develop addictions and some don't    Use teach-back techniques to ensure patients' understanding.     Expected therapeutic outcomes:     Better understand the disease concept of addiction    Client will understand the term \"hijacked\" and its application to addiction.    Reduce confusion about post-acute withdrawal symptoms to better manage them.      Therapeutic outcome(s) measured by:    Patient's ability to teach-back information learned in group .        Group Attendance:  Attended group session    Patient's response to the group topic/interactions:  cooperative with task, discussed personal experience with topic, expressed readiness to alter behaviors and expressed understanding of topic    Patient appeared to be Actively participating, Attentive and Engaged.        Clients specifice information : Virginia told group that she doesn't even use the word \"drink\" to apply to anything because it can be a trigger for her or her family.  She said she has started to use the word beverage.  Group said they found this insight helpful.   Today's group focused on dim 5 science of addiction, triggers and warning signs/reasons I drink and dim 3 shame.    Client was able to name 5 warning signs/triggers and plan for dealing with each, including:  Certain friends, so she doesn't hang out with them right now.  Also relaxation is a trigger, but she said she is learning to associate it with actually relaxing, not drinking    Client participated in psychoeducation on the Science of Addiction " and was able to define addiction, as well as describe the reward pathway. Client participated in discussion on reasons why some may develop addiction and others may not.    2 feelings today: grateful and joyful  2 things I am grateful for:  My health, even though I am dealing with cancer, also my   2 things I will work on the next 5 days:  Getting new insurance, and providers figured out.  Something I am doing well: looking at feelings.      P) Listen to You tube : The Science of Addiction, 9/12/20, to help gain insight of the science of addiction.      Attestation:   Jonathan Juares MD - Medical Director - Provides oversight and supervision of care.         Judi Dodd MS, [facility], [facility]

## 2023-07-19 NOTE — PROGRESS NOTES
"Individual Session in Lieu of Group    Attestation: Chris Juares MD - Medical Director - Provides oversight and supervision of care.    Individual session  Start time 1:05  End time   2:00    Only two clients are here today, in lieu of group client had individual session, per policy.      D) [place] shared she had an appointment with her \"team,\" and the tumor has grown, and she will need surgery. An appointment is scheduled 7/25/23 @11:30 with the surgeon. Client shared she may be late to group. Client also will see cardiologist for an enlarged aorta. This appointment has not be scheduled yet. Client verbalized the first couple of days were overwhelming and knows much of this is out of her control. She shared she has confidence in her liver team, is grateful and happy. Grateful for all she has, , adult children and grandchildren which makes her happy. Happy to be in treatment and have the support of her peers. Looks forward to getting to know the new member of group.  Client denies having grief about the loss of alcohol. She shared when she was first diagnosed, she knew what she needed quit drinking to live a full life. Verbalized [place] who's behavior was affected by drinking and wondering if she had behaved the same.   Client asked about spouse's nursing home, she spoke about settling into  nursing home, together,  taking time for herself and figuring out insurance moving forward.   With encouragement client spoke about Mother in [place], being in a facility to take care of her. Client is hopeful she will not need to be moved to another facility.  Client asked about yesterday's reading about shame. Client mentioned shame reoccurs in the readings, group and other materials she's reading. We discussed healing from shame is a process and at times it can bring one down. To prevent the internal spiral from going to deep, remember your are a good person and you " are enough. Cleint talked about not concerning self with what others thought.     I) MI, provided validation on her positive outlook, recognizing what is and what isn't in her control.       P) Client to continue with treatment and attend all medical appointments. To explore a topic she might want to discuss next week, if only two attend.       Attestation:   Jonathan Juares MD - Medical Director - Provides oversight and supervision of care.

## 2023-07-19 NOTE — PROGRESS NOTES
"Individual Session in Lieu of Group    Attestation: Chris Juares MD - Medical Director - Provides oversight and supervision of care.    Individual session  Start time 11:25  End time   11:56        D) We discussed her health news and how she is feeling and dealing with things.  \"it is sinking in more and more that I have cancer and this is getting more serious\"  She had an appointment with her \"team,\" and the tumor has grown, and she will need surgery.  Virginia said she has an appt  scheduled 7/25/23 @11:30 with the surgeon.  Client also will see cardiologist for an enlarged aorta. . She shared that overall she is doing well and that she has confidence in her liver team.  She also said between this medical stuff and treatment, she is very grateful for her, , adult children and grandchildren. I asked Virginia if/how this news and her treatment have affected her spirituality. She said it is all connected to her.  She said she has strong holden and also feels in her heart that God is with her and guiding her.  I told her we will continue to explore spirituality in group sessions and she said that sounds good.  She said through all this, she has some hard and sad feelings, but doesn'f feel depressed or too anxious.  \"no thoughts of self harm\"       I) MI, brief therapy, validation, spirituality, encouragement     P)Virginia will continue to explore spirituality and growth and discuss as she sees fit.  Attend all medical appts.       Attestation:   Jonathan Juares MD - Medical Director - Provides oversight and supervision of care.  "

## 2023-07-20 ENCOUNTER — TRANSFERRED RECORDS (OUTPATIENT)
Dept: HEALTH INFORMATION MANAGEMENT | Facility: CLINIC | Age: 58
End: 2023-07-20
Payer: MEDICARE

## 2023-07-20 NOTE — TELEPHONE ENCOUNTER
RECORDS STATUS - ALL OTHER DIAGNOSIS      RECORDS RECEIVED FROM: [facility]   DATE RECEIVED:    NOTES STATUS DETAILS   OFFICE NOTE from referring provider Epic 7/14/23: Dr. Yarely Shah   OFFICE NOTE from medical oncologist Epic 1/26/23: Dr. Philly Castellanos   OFFICE NOTE from other specialist Epic 2/14/23: Dr. Pravin Ball  2/14/23: Dr. Jeannette Cervantes   DISCHARGE SUMMARY from hospital [facility] 5/11/23: [facility]  2/27/23: [facility]  2/21/23: [facility]  12/31/22: [facility]    OPERATIVE REPORT Epic 5/11/23: EGD   MEDICATION LIST [facility]    LABS     PATHOLOGY REPORTS [facility]- [facility] (not req due to protocol) 4/24/19: [id number]   ANYTHING RELATED TO DIAGNOSIS Epic Most recent from 7/7/23   IMAGING (NEED IMAGES & REPORT)     MRI PACS 7/7/23: MR Abd  3/29/23, 1/19/23, 10/23/20: MR Liver   XRAY PACS 5/20/20: XR Abd   BONE PACS 3/27/23   ULTRASOUND PACS 2/14/23: US Abdomen

## 2023-07-24 ENCOUNTER — TELEPHONE (OUTPATIENT)
Dept: GASTROENTEROLOGY | Facility: CLINIC | Age: 58
End: 2023-07-24
Payer: MEDICARE

## 2023-07-24 ENCOUNTER — HOSPITAL ENCOUNTER (OUTPATIENT)
Dept: BEHAVIORAL HEALTH | Facility: CLINIC | Age: 58
Discharge: HOME | End: 2023-07-24
Attending: FAMILY MEDICINE
Payer: COMMERCIAL

## 2023-07-24 PROCEDURE — H2035 A/D TX PROGRAM, PER HOUR: HCPCS | Mod: HQ

## 2023-07-24 NOTE — GROUP NOTE
Group Therapy Documentation    PATIENT'S NAME: Abiola Matute  MRN:   [id number]  :   1965  ACCT. NUMBER: [id number]  DATE OF SERVICE: 23  START TIME: 12:00 PM  END TIME:  2:00 PM  FACILITATOR(S): Onelia Singh LADC  TOPIC: BEH Group Therapy  Number of patients attending the group:  3  Group Length:  2 Hours    Group Therapy Type: Addiction, Life skill(s), and Psychotherapeutic    Summary of Group / Topics Discussed:    Balanced Lifestyle , Boundaries, Communication, Coping Skills/Lifestyle Managemet, Meditation/Breathing Exercises, and Relaxation Techniques    Attestation:   Jontahan Juares MD - Medical Director - Provides oversight and supervision of care.     Group Attendance:  Attended group session    Patient's response to the group topic/interactions:  cooperative with task, discussed personal experience with topic, gave appropriate feedback to peers, and listened actively    Patient appeared to be Actively participating, Attentive, and Engaged.        Client specific details:  Client checked in feeling appreciative.  Participated in body scan mediation, practiced deep breathing, and agreed with client who reported feels like I had a massage. Client briefly shared the weekend spent time with family and grandchildren. Briefly discussed giving too much, communicating effectively and boundaries. Each client participated and gave supportive feedback to each other. Client to meet with this counselor for a 1:1, due to only 2 clients attending group. She talked about last week when grief re: alcohol she immediately said no and has been thinking about should she have feelings or no feeling and requested to talk about grief as related to alcohol.Client requested an excused absence on Wednesday as she has an appointment with the surgeon at 11:30 am, client excused.

## 2023-07-24 NOTE — PROGRESS NOTES
[facility] Weekly Treatment Plan Review      Attestation:   Jonathan Juares MD - Medical Director - Provides oversight and supervision of care.       Date Span:Monday: 23 through 23      Date     Group Therapy 2 hours I hour in lieu of group 0 hours excused 0 hours 0 hours       Individual Therapy   1 hour in lieu of group             Family Therapy                 Psychoeducation                 Other (Specify)                  Client has no absences this week    Client has one excused absence this week , due to medical appts.  Client excused from group , due to medical appointment.     Program Running Totals: (No Phase 1 IOP due to this program being only OP level of care)  Total # of Phase 2 OP Group Sessions:18 for 34   Virginia gets 30 sessions for 60 hours at this time  Total # of Phase 3 OP Recovery Management Group Sessions: None    Total # of 1:1 Sessions:  1 hour BALWINDER   1 hour 5/30  7/3                                    Darinel , , ( & -Onelia)                    Weekly Treatment Plan Review     Treatment Plan initiated on: .    Dimension1: Acute Intoxication/Withdrawal Potential -   Previous Dimension Ratin  Current Dimension Ratin  Date of Last Use 22  Any reports of withdrawal symptoms - No      Dimension 2: Biomedical Conditions & Complications -   Previous Dimension Ratin  Current Dimension Ratin    Medical Concerns:  WK of -Client has an appointment with surgeon regarding increase in tumor. She is excused from programming due time conflict.  wk of  She had appt with radiology on , continues to work with her team due to LT, cancer and rash.  Virginia told group she is dealing with some news, medically, but working with her Drs.  She said she isn't wanting to share a whole lot, until she gethers her information, but she did want group to know she has support and  "appreciates just being able to share what she did  wk 7/10She reports rash and working with DrChing, also had CT and MRI related to ongoing care for cancer and need for liver.  wk of 7/3  I get an infusion 1x per month and usually by week 3, I feel more sore and more tired, but I am grateful for the infusions.  \"no concerns beyond ongoing liver cancer and Crohns\"  wk of  She saw Jazmin Barrios at Urgent care for skin issues.  She also said she met with her GI practioner  She reports having liver cancer and Crohn's disease.  \"right now I am doing pretty well  Outside medical appointments this week (list provider and reason for visit)wk of  She had appt with radiology, Dr. Shah,on , continues to work with her team due to LT, cancer and rash  : wk of  she saw Susanna RAZA for rash on leg.  Had a PeTH test , see below. No issues  :  she saw Jazmin Meng PA-C for a flare up of her psoriasis likely related to her other medical issues, per client     Current Medications & Medication Changes:  Current Outpatient Medications   Medication    ammonium lactate (AMLACTIN) 12 % external cream    clobetasol (TEMOVATE) 0.05 % external cream    furosemide (LASIX) 40 MG tablet    inFLIXimab (REMICADE) 100 MG injection    Multiple Vitamins-Minerals (MULTIVITAMIN & MINERAL PO)    spironolactone (ALDACTONE) 100 MG tablet    triamcinolone (KENALOG) 0.1 % external cream     No current facility-administered medications for this encounter.     Facility-Administered Medications Ordered in Other Encounters   Medication    BREEZA Lemon-Lime flavored oral liquid for Neutral Abdominal/Pelvic Imaging 1,000 mL    hyoscyamine (LEVSIN/SL) sublingual tablet 125 mcg     Medication Prescriber:  Linda Macdonald  Taking meds as prescribed? Yes  Medication side effects or concerns:  no        Dimension 3: Emotional/Behavioral Conditions & Complications -   Previous Dimension Ratin  Current Dimension Ratin  PHQ2:       " 7/19/2023     8:00 AM 6/13/2023     1:34 PM 5/25/2023    10:00 AM 3/28/2023    11:21 AM 3/26/2023     1:45 PM 2/16/2023     9:35 AM 1/3/2023     2:46 PM   PHQ-2 ( 1999 [employer])   Q1: Little interest or pleasure in doing things 0    0 0 0 0 0 0 0   Q2: Feeling down, depressed or hopeless 0    0 0 0 0 0 1 0   PHQ-2 Score 0    0 0 0 0 0 1 0   Q1: Little interest or pleasure in doing things Not at all   Not at all  Not at all Not at all   Q2: Feeling down, depressed or hopeless Not at all   Not at all  Several days Not at all   PHQ-2 Score 0   0  1 0      GAD2:       2/16/2023     9:54 AM 5/25/2023    10:00 AM 7/19/2023     8:00 AM   TAINA-2   Feeling nervous, anxious, or on edge 1 1 0    0    0    0    0   Not being able to stop or control worrying 1 0 0    0    0    0    0   TAINA-2 Total Score 2 1 0    0    0    0    0     PROMIS 10-Global Health (all questions and answers displayed):       2/16/2023     9:58 AM 2/22/2023     7:32 PM 5/25/2023    10:00 AM 7/19/2023     8:02 AM   PROMIS 10   In general, would you say your health is: Good Good  Good   In general, would you say your quality of life is: Good Good  Very good   In general, how would you rate your physical health? Fair Fair  Fair   In general, how would you rate your mental health, including your mood and your ability to think? Good Good  Very good   In general, how would you rate your satisfaction with your social activities and relationships? Good Good  Excellent   In general, please rate how well you carry out your usual social activities and roles Good Good  Very good   To what extent are you able to carry out your everyday physical activities such as walking, climbing stairs, carrying groceries, or moving a chair? Completely Completely  Mostly   In the past 7 days, how often have you been bothered by emotional problems such as feeling anxious, depressed, or irritable? Sometimes Rarely  Rarely   In the past 7 days, how would you rate your fatigue on average?  Mild Mild  Mild   In the past 7 days, how would you rate your pain on average, where 0 means no pain, and 10 means worst imaginable pain? 3 3  3   In general, would you say your health is: 3 3 3 3    3    3    3    3   In general, would you say your quality of life is: 3 3 4 4    4    4    4    4   In general, how would you rate your physical health? 2 2 3 2    2    2    2    2   In general, how would you rate your mental health, including your mood and your ability to think? 3 3 4 4    4    4    4    4   In general, how would you rate your satisfaction with your social activities and relationships? 3 3 5 5    5    5    5    5   In general, please rate how well you carry out your usual social activities and roles. (This includes activities at home, at work and in your community, and responsibilities as a parent, child, spouse, employee, friend, etc.) 3 3 5 4    4    4    4    4   To what extent are you able to carry out your everyday physical activities such as walking, climbing stairs, carrying groceries, or moving a chair? 5 5 4 4    4    4    4    4   In the past 7 days, how often have you been bothered by emotional problems such as feeling anxious, depressed, or irritable? 3 2 2 2    2    2    2    2   In the past 7 days, how would you rate your fatigue on average? 2 2 2 2    2    2    2    2   In the past 7 days, how would you rate your pain on average, where 0 means no pain, and 10 means worst imaginable pain? 3 3 4 3    3    3    3    3   Global Mental Health Score 12 13 17 17    17    17    17    17   Global Physical Health Score 15 15 14 14    14    14    14    14   PROMIS TOTAL - SUBSCORES 27 28 31 31    31    31    31    31     Mental health diagnosis none  Date of last SIB:  never  Date of  last SI:  never  Date of last HI: never  Behavioral Targets:  see tx plan  Risk factors:  Client dealing with liver disease/cancer and Crohns, client needs a new liver,   Protective factors:  spirituality,  "forward/future oriented thinking, safe and stable environment, regular physical activity, living with other people and structured day  Current MH Assignments:  see tx plan      Narrative:   Wk of 7/24- client denies thoughts of self-harm. On Likert scall 1-low and 10-high client rates anxiety 1  depression 0 and stress 2. She shared her new diagnosis of tumor growth has impacts stress and anxiety.   wk of 7/17: no thoughts of self harm or concerns\" on likert scale 1-low and 10 -high:   Anxiety:  5  stress: 5 depression 1  issues to discuss and Helpful coping:  All related to my medical, but I have great medical team and family support.  Client said of the reading:I like the ongoing discussions of shame and this reading brought me awareness that I have some to deal with regarding the nature of why many get liver cancer.       wk of 7/10:  She was able to repeat the steps of 5 senses, as a review.   \"no thoughts of self harm or concerns\".  Clients feedback to group member  on likert scale 1-low and 10 -high:   Anxiety:  2  stress: 2  depression 2  issues to discuss and Helpful coping: talking with you guys.  I had a craving that was pretty big this week.  Psychoeducation/Skills The thinking-feeling connection. This topic will address information from the [place] for Clinical Interventions about how our thoughts influence our feelings.  Client was able to define automatic thoughts and make a connection with some that she has.   She said that she has been continueing to gain awareness of her beliefs vs perceptions vs what is a thought and what is a feeling.  Discussed that reminding herself it is ok to have thoughts and feelings, but they are not facts for other people.    Client participated in lovingkindness meditation and said it was just what she needed today and it that it was helpful and grounding.  \"Thank you for doing it\"    wk of 7/3: Clients participated in 5 senses psychoeducation discussion, participated in " "guided practice and was able to name 5 parts, and said she feels it will be a helpful tool  \"no thoughts of self harm or concerns\".   I discussed the research on gratitude journals and expressed she has seemed like she has an \"attitude of gratitude\".  She said she feels blessed and hopes she does.  on likert scale 1-low and 10 -high:   Anxiety:  0  stress: 0 depression 0  issues to discuss and Helpful coping: I am learning to say no, and that boundaries are good    wk of 6/26  Attended  skills group with Darinel Angely.    She participated in psychoeducation on Core beliefsThis topic will discuss core beliefs, as a lens through which we see life and how that affects our choices.  Virginia said the discussion on core beliefs is something she will spend some time on, as the awareness is helpful.  I told her we will follow up on Wednesday with more that can be helpful when we notice harmful core beliefs.   \"no thoughts of self harm or concerns\"  on likert scale 1-low and 10 -high:   Anxiety:  1 stress: 1 depression 0  issues to discuss and Helpful coping: learning about self, anxiety and stress  wk of 6/19 \"no thoughts of self harm or no concerns\".  She reports on likert scale 1-low and 10 -high:   Anxiety:  1  stress: 1 depression 0  issues to discuss and Helpful coping: getting to know people better in group, hearing other's helpful coping.  Attended  skills group with Darinel Angely.  We discussed anticipating some situations and \"gearing up\" by practicing deep breathing. I reminded client of previous discussions where beliefs often guide how we feel about certain situations, but that we can reframe them, or look at them for the particular situation.      Wk of 6/12  I am feeling much better and less anxiety about group attendance as I continue to attend and get to know others. \"no thoughts of self harm or concerns\". On likert scale 1-low and 10 -high:   Anxiety:  1  stress: 1 depression 1  issues to discuss " "and Helpful coping:  Breathing, getting to others in group  Wk of  \"no thoughts of self harm\"  Attended MH skills group with Darinel Caojeannette  Week of 23 :Client reports feeling anxious due to first treatment experience, not knowing what to expect, her  Mother's declining health-needing a placement for higher level of care, spouse recently lost his job-need to obtain COBRA insurance and unable to care for grandchildren because of attending treatment. Client was saw Traci Aguilar and has not seen them in about 6 weeks. To follow up with primary counselor re: seeing psychotherapist or return to [facility].  Client rates the following on a scale of 1 (low) to 10 (high):  Anxiety- 5  Depression -1  Stress- 5 as a result of above concerns.   Client participated in psychotherapy/education on self-care, introduction to relaxation meditation-tensing and relaxing muscles, and sleep hygiene.  Current Mental Health symptoms include: . Active interventions to stabilize mental health symptoms this week :   At SI her PHQ-2 score was 0 and his TAINA-2 score was 1.  PROMISE 10 was  31:  She reports that at this time she is dealing with her mother's living situation and health issues. She reports that her father has passed away and that she is estranged from 1 sister.         Dimension 4: Treatment Acceptance / Resistance -   Previous Dimension Ratin  Current Dimension Ratin  RENEE Diagnosis:  Alcohol Use Disorder   303.90 (F10.20) Severe In a controlled environment  Commitment to tx process/Stage of change- contemplation  RENEE assignments - see tx plan      Narrative -   WK of -motivation 10 on Likert scale.she expressed strong commitment to sobriety, see notes dated  for more details.  wk of :  on likert scale 1-low and 10 -high:  motivation for sobriety: 10+  wk of 7/10  My motivation for sobriety is 10/10\".  I think it is so important to attend and be dedicated to support in this group, and to " "\"say things outloud, so it takes the power away, as you say, Alana\"  wk of 7/3  on likert scale 1-low and 10 -high:  motivation for sobriety 10.  Virginia said  session really stuck with her and she thought about a lot of things and is really understanding why attendance and building on the days and lessons is important.     wk of   sober support motivation:  2, but I think motivation will grow as I keep settling in and I am motivated 10/10 for sobreity.    Wk of :  Client reports motivation for sobriety is 10 (high) Motivation for attending community support 5 (on scale 1 low-10 high).   Virginia expressed to her group how helpful they are to her and how much she appreciates all that members shared about their stories.  She said she gains such helpful insight for self   Week of  Virginia discussed that she has been late a couple times, but realized with 2 other members gone, how this speaks to the importance of being on time and valuing and respecting others time, too.   She will also miss tomorrows group due to a long awaited medical appt. Dim 4: on likert scale 1-low and 10 -high:  Motivation for sobriety:  10  motivation for sober support attendance: not doing yet  Wk of   Virginia also said she finds motivation for sobriety in her family and friends, \"but really deep down need to do this for myself\"  Told counselor and group how she didn't think she would get much out of tx and already is getting so much and is grateful for older adult group   Week of 23: Client reports motivation for sobriety is 10 (high) Motivation for attending community support 5 (on scale 1 low-10 high).  The patient is motivated, to explore treatment.  She said \"of course I am partly here to fulfill requirements for liver transplant, but I want to learn healthy coping for lifelong sobriety.      Dimension 5: Relapse / Continued Problem Potential -   Previous Dimension Ratin  Current Dimension Ratin  Relapses this " "week - None  Urges to use - None  UA results -   No results found for this or any previous visit (from the past 168 hour(s)).  Using ReSet Clem: N/A    Narrative-    WK of 7/24-client denies triggers, cravings or warning signs this past week.  wk of 7/17:  cravings, warning signs, triggers:i went to a musical festival, had a few cravings, but also had insight when watching others use, \"glad it isn't me\"  Wk of 7/10 Had a PeTH test 7/7, see above. No issues. She discussed the Serenity Prayer and that she finds it helpful.  She also discussed having cravings at a 4 of 10 when cooking, and what she found helpful:  Processed, talked with , talk with group today about it.   Virginia said she really had quite a learning week with events in group, discussions and assignments.  \"My heart goes out to the new person the other day, but also I had some anger about all the disruptions and negativity from her\"  \"I am ready to move on, and learn what I learned, and know that could be any one of us\"  wk of 7/5  cravings, warning signs, triggers: none.  My adult kids are caring and asked if I thought I would be OK going to [facility], as there is drinking there.  She told them she appreciates the concern, but feels with them and little to no triggers, for quite some time, she didn't feel it was a problem.  Virginia said she had a wonderful time with her family and is not concerned about use over 4th of July holiday either, she has a good plan for spending time with family and focusing on good food.   wk of 6/26  Dim 5: cravings, warning signs, triggers: none.  Wed reading was focused blame.  It said blaming is hiding, that when we blame others we try to hide our character defects.  We discussed the meaning of character defects. \"I was in big denial for a long time, it took being told I would need a new liver for me to really realize what drinking was doing. \"it hit me like a ton of bricks when I finally really heard it\"  wk of " "  She participated in  Psychoeducation/Skills: Self-Care.  Client s completed self-care assessment and described self-care as feeling better physically. Client identified physical self-care they do well- attending medical appointments, one they do poorly or needs improvement- eating healthy food and barriers that prevent themselves from self-care - cost of food is expensive, the shopping and prep.  This topic will give a general overview of self-care: physical, environmental, emotional, social and spiritual. It will explore importance of self-care and the impact on recovery.   Wk of . Dim 5: warning signs, triggers and cravings: none   helpful coping: attending group. Examples of benefits of drinking or drug use, or other behaviors Physical, Social mental or emotional: confidence, escape from reality, \"friends\"     Main drawbacks connected to these: not real friends that only want to drink together.  Headaches and slurred speech.  Remoarse  Benefits connected to abstinence from addictive behaviors:more able to do me and be me, I enjoy trud friends, I remember things better and will have better memories      Drawbacks you see connected with abstinence from these:  Wk of she participated in the daily reading and said she liked it.  Virginia also gave meaningful feedback to client who discussed use episode: \"it could be anyone of us\"  She said this is important and she is learning from others sharing.  Week of 23: Client denies riggers or warning signs this past week. She said she liked the saying \"it is just as easy to create good habits as bad ones\"   Patient reports family members and her  are concerned about their substance use.  Patient reports their recovery goals are \"abstinence forever.\"  She has had no previous detoxes or treatments and needs to gain insight into healthy coping for relapse prevention.      Dimension 6: Recovery Environment -   Previous Dimension Ratin  Current Dimension " "Ratin  Family Involvement - not at this time  Summarize attendance at family groups and family sessions - NA  Family supportive of treatment?  Yes    Community support group attendance - no  Recreational activities - some camping, cooking and boating    Narrative -  WK of -client's support are her family, she is not attending support groups and motivation to attend is 2 on Likert scale. Client shared she has learned this week is this is a continuous journey and staying positive is huge. To support her sobriety this week she will continue to surround herself with good people. She is grateful for home, shelter and stability.  wk of :  sober support motivation: this group and my family.     wk of 7/10: \"I am motivated for outside support at 2/10, but when I get done with Tuesday group, I think my motivtiaon will be higher\"  wk of    We discussed \"The power of Solitude\" and how spending time alone with ourselves may not be easy or even desirable, it it is key to getting to know self.  I emphasized that studies tell us we need each other to survive and be happy, but also when we loose the ability to be alone with ourselves, sometimes our overstimulated nervous systems suffer from no place to rest and recharge.  She said this is good to discuss, as she may even consider doing it a bit more, as it \"really does recharge me\" She said she is pretty good about finding alone time and also uses journaling.  I asked client how this applies to recovery and treatment:She said she will often take a topic from group, or a question and journal about.  She said it really helps   Wk of . She said sober support motivation is  2, but I think motivation will grow as I keep settling in  I talked with clients about taking responsibility for our actions, so we can be empowered and own our life and also change if we need to. We discussed resources including AA, SMART Recovery.  I encouraged clients to start exploring " "meetings.  Wk of 6/19.  Virginia reports with having group 3x per week, it is a good deal of support, \" I will continue to learn about peer support\"  wk of 6/12client discussed resources sober support, including AA, Liver Transplant support and [place] recovery connection  wk of 6/6  goals for the wknd: look into volunteering at [facility] for writing card.  \"no community support attendance at this time\"   Client is not currently attending community sup[ort and does not have a sponsor. Client's supportive people are her family. Client shared she has friends and has not talked with many about her current situation, because \"I am a private person,\" (treatment and needing transplant).  Patient reports they are involved in community of holden activities    They reports spirituality impacts recovery in the following ways:  \"There was some changes as far as .  We do belong to a [employer] but it's been a number of years since we've been.\"  Patient believes her spirituality and sobriety are connected. Patient reported having 3 children, all adults.  Patient has 2 grandchildren.    She lives with her spouse and son. recreational/leisure activities: camping, cooking, boating, playing board games and card games.  Patient is currently disabled due to her Crohn's Disease.  Patient reports their income is obtained through SSDI disability; spouse.  Patient does not identify finances as a current stressor.         Progress made on transition planning goals: just began tx    Justification for Continued Treatment at this Level of Care:  No previous tx, needs 6 months of sobriety to be eligible for a transplant, but is having some medical issues that she ocontinues to work on with her medical team needs to learn long term relapse prevention, she reports finding that attending group is helpful for her and she is learning.She said she did not think she would get much out of this but is getting so much support and understanding of healthy coping.  She " is not attending community support, at this time.  She is working on goals and assignments.  She gets regular PEth  tests, and there have been no indications of alcohol use.    Treatment coordination activities this week:7/18  Discussed client info with margaret Singh   Discussed client info with margaret Singh  Need for peer recovery support referral? No    Discharge Planning: Not at this time      Has vulnerable adult status change? No    Interdisciplinary Clinical Supervision including:  no    Are Treatment Plan goals/objectives effective? Yes  *If no, list changes to treatment plan:    Are the current goals meeting client's needs? Yes  *If no, list the changes to treatment plan.  Plan:  Continue per Master Treatment Plan    *Client agrees with any changes to the treatment plan: Yes  *Client received copy of changes: Yes  *Client is aware of right to access a treatment plan review: Yes     Staff Members Contributing To Weekly Review:   SOLANGE Reese, MS, SOLANGE, LPC  Lead Counselor Adult [facility] Darinel Au, DD, LMFT, SOLANGE, CGTP-[place] Licensed [facility]     WK of 7/24- SOLANGE Reese, CTTS

## 2023-07-24 NOTE — TELEPHONE ENCOUNTER
"Printed predictr PK order and faxed to [facility] at [phone]. Placed on cover page per nurse \" draw prior to infusion on 8/3/23.\" Will call to confirm receipt.     Called [facility] and went right to voicemail. Left message in inquire if received lab order. Left writers callback number.    Confirmed with Shantal they have the lab order.      Zayra Johnson LPN      "

## 2023-07-24 NOTE — TELEPHONE ENCOUNTER
"Hello Team,  Please fax predictr pK lab order to [facility]  (f) [phone]. Please write on the cover sheet, \"Please draw prior to next infusion on 8/3/23.\"    Please call (o) [phone] to confirm receipt.    Thank you.  Remi"

## 2023-07-25 ENCOUNTER — HOSPITAL ENCOUNTER (OUTPATIENT)
Dept: BEHAVIORAL HEALTH | Facility: CLINIC | Age: 58
Discharge: HOME | End: 2023-07-25
Attending: FAMILY MEDICINE
Payer: COMMERCIAL

## 2023-07-25 PROCEDURE — H2035 A/D TX PROGRAM, PER HOUR: HCPCS

## 2023-07-25 NOTE — ADDENDUM NOTE
Encounter addended by: Onelia Singh LADC on: 7/24/2023 7:37 PM   Actions taken: Clinical Note Signed

## 2023-07-26 ENCOUNTER — PATIENT OUTREACH (OUTPATIENT)
Dept: ONCOLOGY | Facility: CLINIC | Age: 58
End: 2023-07-26

## 2023-07-26 ENCOUNTER — ONCOLOGY VISIT (OUTPATIENT)
Dept: SURGERY | Facility: CLINIC | Age: 58
End: 2023-07-26
Attending: RADIOLOGY
Payer: COMMERCIAL

## 2023-07-26 ENCOUNTER — PRE VISIT (OUTPATIENT)
Dept: SURGERY | Facility: CLINIC | Age: 58
End: 2023-07-26
Payer: MEDICARE

## 2023-07-26 ENCOUNTER — PREP FOR PROCEDURE (OUTPATIENT)
Dept: ONCOLOGY | Facility: CLINIC | Age: 58
End: 2023-07-26

## 2023-07-26 VITALS
SYSTOLIC BLOOD PRESSURE: 120 MMHG | OXYGEN SATURATION: 98 % | DIASTOLIC BLOOD PRESSURE: 85 MMHG | WEIGHT: 185 LBS | BODY MASS INDEX: 29.73 KG/M2 | HEART RATE: 85 BPM | HEIGHT: 66 IN | RESPIRATION RATE: 16 BRPM

## 2023-07-26 DIAGNOSIS — C22.0 HCC (HEPATOCELLULAR CARCINOMA) (H): ICD-10-CM

## 2023-07-26 DIAGNOSIS — C22.0 HCC (HEPATOCELLULAR CARCINOMA) (H): Primary | ICD-10-CM

## 2023-07-26 PROCEDURE — G0463 HOSPITAL OUTPT CLINIC VISIT: HCPCS | Performed by: SURGERY

## 2023-07-26 PROCEDURE — 99215 OFFICE O/P EST HI 40 MIN: CPT | Performed by: SURGERY

## 2023-07-26 ASSESSMENT — PAIN SCALES - GENERAL: PAINLEVEL: NO PAIN (0)

## 2023-07-26 NOTE — PROGRESS NOTES
Individual Session in Lieu of Group     Attestation: Chris Juares MD - Medical Director - Provides oversight and supervision of care.     Individual session  Start time 12:00  End time   12:55     Only two clients here today, in lieu of group client had individual session.    D) During group yesterday client revisited topic of grief and and how it relates to alcohol. She shared she has been thinking is she is doing it right or wrong. She was reassured their is no right or wrong way to process grief.   Client talked about persons close to her she has lost, grandparents and her father. She verbalized it took her at least a year to grieve him.   We reviewed and discussed Tye' stages of grief and emotions describing basic feelings. As described in Nahid' information, we talked about moving towards acceptance and backtracking to a previous stage is normal as long as the process does not get stuck in a stage for a long period of time.   Client acknowledged having denial prior to the beginning of 2023. She shared when she received the diagnosis of liver cancer, she made the decision before she arrived home she was going to stop drinking. She verbalized she made a commitment to herself, spouse and adult children she was no longer drinking to live a long, and as healthy life possible.  Client was unsure if she had anger. She recognized she felt ticked off, crabby, annoyed and spiteful. Client said their was some blame towards her grandfather who was an alcoholic. Client described acceptance: if someone hurt me several times, that's not acceptable and would not let it happen again. Alcohol hurt me medically and this was the last time it would happen.       I) MI, validation, having holden in her liver team and understanding the gravity of her illness. Provided handouts, review and discussion.       P) Client to attend 11:30 am medical appointment tomorrow, and follow through with their recommendations. Client will   group tomorrow 7/26 due to conflict with medical appointment, she is excused.

## 2023-07-26 NOTE — LETTER
"    7/26/2023         RE: Abiola Matute  [address] [address]        Dear Colleague,    Thank you for referring your patient, Abiola Matute, to the [facility]. Please see a copy of my visit note below.    Oncology Rooming Note    July 26, 2023 11:38 AM   Abiola Matute is a 58 year old female who presents for:    Chief Complaint   Patient presents with     Oncology Clinic Visit     Initial Vitals: /85   Pulse 85   Resp 16   Ht 1.676 m (5' 6\")   Wt 83.9 kg (185 lb)   LMP 05/31/2006   SpO2 98%   BMI 29.86 kg/m   Estimated body mass index is 29.86 kg/m  as calculated from the following:    Height as of this encounter: 1.676 m (5' 6\").    Weight as of this encounter: 83.9 kg (185 lb). Body surface area is 1.98 meters squared.  No Pain (0) Comment: Data Unavailable   Patient's last menstrual period was 05/31/2006.  Allergies reviewed: Yes  Medications reviewed: Yes    Medications: Medication refills not needed today.  Pharmacy name entered into Dunwell[facility]:    [facility] - [place], [place] - [address] [place] [address]  [facility] - [place], [place] - [address] [place] [address]  [facility] - [place] [address]    Clinical concerns:   doctor was notified.      Kim Steele, [facility]              Ms. Matute is a 58-year-old female with alcohol-related cirrhosis of the liver.  She had a hepatocellular cancer in segment 7 of her liver which was treated with Y90 previously.  There is a 2.2 cm round area with in the previous treatment zone which is concerning for residual tumor.  This is along with a persistently elevated AFP.  I was asked by Dr. Rahman to consider laparoscopic microwave ablation of her liver tumor.    Overall the patient has well compensated liver function.    I discussed with Ms. Matute my recommendation that " we proceed with laparoscopic ultrasound-guided microwave ablation of her liver tumor.  I discussed the details of that surgery.  I discussed risks including but not limited to bleeding, infection, venous thromboembolism, worsening liver function.  Overall I think she will tolerate the procedure well.    We will get her set up for a preop visit and I will look forward to seeing her in the operating room for a planned laparoscopic ultrasound-guided microwave ablation of liver tumor in the coming weeks.    A total of 30 minutes was spent in face-to-face time.  15 minutes was spent in review of history imaging and coordination of care.  5 minutes was spent in documentation.    I also discussed that I am a consultant for [employer] and I do get compensation for teaching other surgeons in this country and a broad how to perform microwave ablation of liver tumors.  The patient did not have any concerns regarding conflict of interest.      Again, thank you for allowing me to participate in the care of your patient.        Sincerely,        Albino Saavedra MD

## 2023-07-26 NOTE — PROGRESS NOTES
"Oncology Rooming Note    July 26, 2023 11:38 AM   Abiola Matute is a 58 year old female who presents for:    Chief Complaint   Patient presents with    Oncology Clinic Visit     Initial Vitals: /85   Pulse 85   Resp 16   Ht 1.676 m (5' 6\")   Wt 83.9 kg (185 lb)   LMP 05/31/2006   SpO2 98%   BMI 29.86 kg/m   Estimated body mass index is 29.86 kg/m  as calculated from the following:    Height as of this encounter: 1.676 m (5' 6\").    Weight as of this encounter: 83.9 kg (185 lb). Body surface area is 1.98 meters squared.  No Pain (0) Comment: Data Unavailable   Patient's last menstrual period was 05/31/2006.  Allergies reviewed: Yes  Medications reviewed: Yes    Medications: Medication refills not needed today.  Pharmacy name entered into Novaled:    [facility] - [place], [place] - [address] [place] [address]  [facility] - [place], [place] - [address] [place] [address]  [facility] - [place], [place] - [address]    Clinical concerns:   doctor was notified.      Kim Steele, [facility]            "

## 2023-07-26 NOTE — PROGRESS NOTES
Ms. Matute is a 58-year-old female with alcohol-related cirrhosis of the liver.  She had a hepatocellular cancer in segment 7 of her liver which was treated with Y90 previously.  There is a 2.2 cm round area with in the previous treatment zone which is concerning for residual tumor.  This is along with a persistently elevated AFP.  I was asked by Dr. Rahman to consider laparoscopic microwave ablation of her liver tumor.    Overall the patient has well compensated liver function.    I discussed with Ms. Matute my recommendation that we proceed with laparoscopic ultrasound-guided microwave ablation of her liver tumor.  I discussed the details of that surgery.  I discussed risks including but not limited to bleeding, infection, venous thromboembolism, worsening liver function.  Overall I think she will tolerate the procedure well.    We will get her set up for a preop visit and I will look forward to seeing her in the operating room for a planned laparoscopic ultrasound-guided microwave ablation of liver tumor in the coming weeks.    A total of 30 minutes was spent in face-to-face time.  15 minutes was spent in review of history imaging and coordination of care.  5 minutes was spent in documentation.    I also discussed that I am a consultant for [employer] and I do get compensation for teaching other surgeons in this country and a broad how to perform microwave ablation of liver tumors.  The patient did not have any concerns regarding conflict of interest.

## 2023-07-26 NOTE — PROGRESS NOTES
[facility]: Cancer Care Plan of Care Education Note                                    Discussion with Patient:                                                      Intro to RNCC role      Education concerns: liver ablation surgery, preparing for surgery, what to expect        Assessment:                                                      Assessment completed with:: Patient;Spouse or significant other    Current living arrangement  I live in a private home    Plan of Care Education   Yearly learning assessment completed?: Yes (see Education tab)  Diagnosis:: liver tumor  Does patient understand diagnosis?: Yes  Tx plan/regimen:: laparoscopic ultrasound-guided microwave ablation of liver tumor  Does patient understand treatment plan/regimen?: Yes  Preparing for treatment:: Reviewed treatment preparation information with patient (vascular access, day of chemo, visitor policy, what to bring, etc.)  Safety/self care at home reviewed with patient:: Yes  Coping - concerns/fears reviewed with patient:: Yes  Plan of Care::  (surgery appt)  When to call provider:: Bleeding;Uncontrolled nausea/vomiting;Increased shortness of breath;New/worsening pain;Shaking chills;Temperature >100.4F;Uncontrolled diarrhea/constipation  Reasons for deferring treatment reviewed with patient:: Yes    Evaluation of Learning  Patient Education Provided: Yes  Readiness:: Acceptance  Method:: Booklet/Handout  Response:: Demonstrates understanding    No assessment indicated    Intervention/Education provided during outreach:                                                       Packet given to patient   Surgery scheduler will call for surgery date and PACs    Patient to follow up as scheduled at next appt  Patient to call/The [employer] message with updates  Confirmed patient has clinic and triage numbers    Lacy Winkler, RN, BSN  Specialty Care Coordinator  [facility]  [phone]

## 2023-07-27 ENCOUNTER — TELEPHONE (OUTPATIENT)
Dept: SURGERY | Facility: CLINIC | Age: 58
End: 2023-07-27
Payer: MEDICARE

## 2023-07-27 NOTE — TELEPHONE ENCOUNTER
Called and LVM for patient to schedule surgery with Dr. Saavedra. Provided direct call back number [phone].        Verónica Kamara on 07/27/23 at 9:19 AM  Senior Perioperative Coordinator   Ph: [phone]

## 2023-07-27 NOTE — TELEPHONE ENCOUNTER
Scheduled surgery for 8/29 in [facility] with Dr. Saavedra.    Patient requested H&P be with PAC. PAC scheduled for 8/15.    MRI scheduled at [facility] per patient request for 9/7. Patient voiced she will be changing her lab appointment scheduled on 9/7 (ordered by Dr. Cervantes) so the appointments will not overlap. Patient stated she was going to do this before writer mentioned the MRI appointment on 9/7.    MRI was originally scheduled for [place] on 9/7 but changed locations per patient request.    Post-op scheduled for 9/20 with Dr. Saavedra  in [facility] per patient request.    Patient has surgery packet.    No further questions/concerns at this time.     Verónica Kamara on 07/27/23 at 10:30 AM  Senior Perioperative Coordinator   Ph: [phone]

## 2023-07-28 NOTE — TELEPHONE ENCOUNTER
FUTURE VISIT INFORMATION      SURGERY INFORMATION:  Date: 23  Location: uu or  Surgeon:  Albino Saavedra MD   Anesthesia Type:  general  Procedure: Laparoscopic microwave ablation of liver tumor intraoperative ultrasound of liver   Consult: ov 23    RECORDS REQUESTED FROM:       Primary Care Provider: Linda Macdonald APRN [facility]    Pertinent Medical History: Hypertension    Most recent EKG+ Tracin23    Most recent Cardiac Stress Test: 23

## 2023-07-30 LAB — UPPER GI ENDOSCOPY: NORMAL

## 2023-07-31 ENCOUNTER — HOSPITAL ENCOUNTER (OUTPATIENT)
Dept: BEHAVIORAL HEALTH | Facility: CLINIC | Age: 58
Discharge: HOME | End: 2023-07-31
Attending: FAMILY MEDICINE
Payer: COMMERCIAL

## 2023-07-31 ENCOUNTER — TELEPHONE (OUTPATIENT)
Dept: ONCOLOGY | Facility: CLINIC | Age: 58
End: 2023-07-31
Payer: MEDICARE

## 2023-07-31 PROCEDURE — H2035 A/D TX PROGRAM, PER HOUR: HCPCS | Mod: HQ

## 2023-07-31 NOTE — PROGRESS NOTES
[facility] Weekly Treatment Plan Review      Attestation:   Jonathan Juares MD - Medical Director - Provides oversight and supervision of care.       Date Span:Monday: 23 through 23      Date     Group Therapy 2 hours  2 hours  0 hours 0 hours       Individual Therapy   1 hour in lieu of group             Family Therapy                 Psychoeducation                 Other (Specify)                  Clients individual sessions noted below. Client had no absences this week    Client has no absences this week    Client has one excused absence this week , due to medical appts.  Client excused from group , due to medical appointment.     Program Running Totals: (No Phase 1 IOP due to this program being only OP level of care)  Total # of Phase 2 OP Group Sessions: 21 for 42   Virginia gets 30 sessions for 60 hours at this time  Counting her individual she has used 28 sessions  Total # of Phase 3 OP Recovery Management Group Sessions: None    Total # of 1:1 Sessions:  1 hour BALWINDER   1 hour 5/30  7/3,                                     Darinel , , ( & -Onelia)                    Weekly Treatment Plan Review     Treatment Plan initiated on: .    Dimension1: Acute Intoxication/Withdrawal Potential -   Previous Dimension Ratin  Current Dimension Ratin  Date of Last Use 22  Any reports of withdrawal symptoms - No      Dimension 2: Biomedical Conditions & Complications -   Previous Dimension Ratin  Current Dimension Ratin    Medical Concerns:wk of :  no, just good news that they may be able to remove all my tumore and I may not need a liver.  WK of -Client has an appointment with surgeon regarding increase in tumor. She is excused from programming due time conflict.  wk of  She had appt with radiology on , continues to work with her team due to LT, cancer and rash.  Virginia told  "group she is dealing with some news, medically, but working with her Drs.  She said she isn't wanting to share a whole lot, until she gethers her information, but she did want group to know she has support and appreciates just being able to share what she did  wk 7/10Jasmyn reports rash and working with , also had CT and MRI related to ongoing care for cancer and need for liver.  wk of 7/3  I get an infusion 1x per month and usually by week 3, I feel more sore and more tired, but I am grateful for the infusions.  \"no concerns beyond ongoing liver cancer and Crohns\"  wk of 6/12 She saw Jazmin Barrios at Urgent care for skin issues.  She also said she met with her GI practioner  She reports having liver cancer and Crohn's disease.  \"right now I am doing pretty well  Outside medical appointments this week (list provider and reason for visit) wk of 7/31: no appts.  wk of 7/24 she saw surgeon Junior Bonilla, she is dealing with her liver tumor  wk of 7/17 She had appt with radiology, Dr. Shah,on 7/14, continues to work with her team due to LT, cancer and rash  : wk of 7/6 she saw Susanna RAZA for rash on leg.  Had a PeTH test 7/7, see below. No issues  : 6/13 she saw Jazmin Meng PA-C for a flare up of her psoriasis likely related to her other medical issues, per client     Current Medications & Medication Changes:  Current Outpatient Medications   Medication    ammonium lactate (AMLACTIN) 12 % external cream    clobetasol (TEMOVATE) 0.05 % external cream    furosemide (LASIX) 40 MG tablet    inFLIXimab (REMICADE) 100 MG injection    Multiple Vitamins-Minerals (MULTIVITAMIN & MINERAL PO)    spironolactone (ALDACTONE) 100 MG tablet    triamcinolone (KENALOG) 0.1 % external cream     No current facility-administered medications for this encounter.     Facility-Administered Medications Ordered in Other Encounters   Medication    BREEZA Lemon-Lime flavored oral liquid for Neutral Abdominal/Pelvic Imaging 1,000 mL "    hyoscyamine (LEVSIN/SL) sublingual tablet 125 mcg     Medication Prescriber:  Linda Macdonald  Taking meds as prescribed? Yes  Medication side effects or concerns:  no        Dimension 3: Emotional/Behavioral Conditions & Complications -   Previous Dimension Ratin  Current Dimension Ratin  PHQ2:       2023     8:00 AM 2023     1:34 PM 2023    10:00 AM 3/28/2023    11:21 AM 3/26/2023     1:45 PM 2023     9:35 AM 1/3/2023     2:46 PM   PHQ-2 (  [employer])   Q1: Little interest or pleasure in doing things 0    0 0 0 0 0 0 0   Q2: Feeling down, depressed or hopeless 0    0 0 0 0 0 1 0   PHQ-2 Score 0    0 0 0 0 0 1 0   Q1: Little interest or pleasure in doing things Not at all   Not at all  Not at all Not at all   Q2: Feeling down, depressed or hopeless Not at all   Not at all  Several days Not at all   PHQ-2 Score 0   0  1 0      GAD2:       2023     9:54 AM 2023    10:00 AM 2023     8:00 AM   TAINA-2   Feeling nervous, anxious, or on edge 1 1 0    0    0    0    0   Not being able to stop or control worrying 1 0 0    0    0    0    0   TAINA-2 Total Score 2 1 0    0    0    0    0     PROMIS 10-Global Health (all questions and answers displayed):       2023     9:58 AM 2023     7:32 PM 2023    10:00 AM 2023     8:02 AM   PROMIS 10   In general, would you say your health is: Good Good  Good   In general, would you say your quality of life is: Good Good  Very good   In general, how would you rate your physical health? Fair Fair  Fair   In general, how would you rate your mental health, including your mood and your ability to think? Good Good  Very good   In general, how would you rate your satisfaction with your social activities and relationships? Good Good  Excellent   In general, please rate how well you carry out your usual social activities and roles Good Good  Very good   To what extent are you able to carry out your everyday physical activities such  as walking, climbing stairs, carrying groceries, or moving a chair? Completely Completely  Mostly   In the past 7 days, how often have you been bothered by emotional problems such as feeling anxious, depressed, or irritable? Sometimes Rarely  Rarely   In the past 7 days, how would you rate your fatigue on average? Mild Mild  Mild   In the past 7 days, how would you rate your pain on average, where 0 means no pain, and 10 means worst imaginable pain? 3 3  3   In general, would you say your health is: 3 3 3 3    3    3    3    3   In general, would you say your quality of life is: 3 3 4 4    4    4    4    4   In general, how would you rate your physical health? 2 2 3 2    2    2    2    2   In general, how would you rate your mental health, including your mood and your ability to think? 3 3 4 4    4    4    4    4   In general, how would you rate your satisfaction with your social activities and relationships? 3 3 5 5    5    5    5    5   In general, please rate how well you carry out your usual social activities and roles. (This includes activities at home, at work and in your community, and responsibilities as a parent, child, spouse, employee, friend, etc.) 3 3 5 4    4    4    4    4   To what extent are you able to carry out your everyday physical activities such as walking, climbing stairs, carrying groceries, or moving a chair? 5 5 4 4    4    4    4    4   In the past 7 days, how often have you been bothered by emotional problems such as feeling anxious, depressed, or irritable? 3 2 2 2    2    2    2    2   In the past 7 days, how would you rate your fatigue on average? 2 2 2 2    2    2    2    2   In the past 7 days, how would you rate your pain on average, where 0 means no pain, and 10 means worst imaginable pain? 3 3 4 3    3    3    3    3   Global Mental Health Score 12 13 17 17    17    17    17    17   Global Physical Health Score 15 15 14 14    14    14    14    14   PROMIS TOTAL - SUBSCORES 27 28  "31 31    31    31 31    31     Mental health diagnosis none  Date of last SIB:  never  Date of  last SI:  never  Date of last HI: never  Behavioral Targets:  see tx plan  Risk factors:  Client dealing with liver disease/cancer and Crohns, client needs a new liver,   Protective factors:  spirituality, forward/future oriented thinking, safe and stable environment, regular physical activity, living with other people and structured day  Current MH Assignments:  see tx plan      Narrative: wk of 7/31  she participated in psychoeducation on Understanding my anxiety   This topic helped client to gain awareness of thoughts and feelings associated with anxiety and act with awareness when feeling anxiety and use helpful coping.  Client did actively participate in group discussions, and was able  to identify anxious thoughts, physical feelings when having anxiety: racing heart, shaking, confusion  \"why me\"  She was able to  identify 2 helpful coping methods: breath, talk with her  who is a comfort, stop the story telling/negativity when aware.  Virginia discussed that her tumor is shrinking and she is \"cautiously optimistic\". She said she wanted to share that she has many feelings associated with this, \"it even could mean I won't need a LT, but that remains to be seen\"  I told Virginia she has been shown how with our spirituality and belief, sometimes things can happen that were never anticipated.  She said \"so true\" I never had an idea that was even a part of my hope\"      Wk of 7/24- client denies thoughts of self-harm. On Likert scall 1-low and 10-high client rates anxiety 1  depression 0 and stress 2. She shared her new diagnosis of tumor growth has impacts stress and anxiety.   wk of 7/17: no thoughts of self harm or concerns\" on likert scale 1-low and 10 -high:   Anxiety:  5  stress: 5 depression 1  issues to discuss and Helpful coping:  All related to my medical, but I have great medical team and family support.  " "Client said of the reading:I like the ongoing discussions of shame and this reading brought me awareness that I have some to deal with regarding the nature of why many get liver cancer.       wk of 7/10:  She was able to repeat the steps of 5 senses, as a review.   \"no thoughts of self harm or concerns\".  Clients feedback to group member  on likert scale 1-low and 10 -high:   Anxiety:  2  stress: 2  depression 2  issues to discuss and Helpful coping: talking with you guys.  I had a craving that was pretty big this week.  Psychoeducation/Skills The thinking-feeling connection. This topic will address information from the [place] for Clinical Interventions about how our thoughts influence our feelings.  Client was able to define automatic thoughts and make a connection with some that she has.   She said that she has been continueing to gain awareness of her beliefs vs perceptions vs what is a thought and what is a feeling.  Discussed that reminding herself it is ok to have thoughts and feelings, but they are not facts for other people.    Client participated in lovingkindness meditation and said it was just what she needed today and it that it was helpful and grounding.  \"Thank you for doing it\"    wk of 7/3: Clients participated in 5 senses psychoeducation discussion, participated in guided practice and was able to name 5 parts, and said she feels it will be a helpful tool  \"no thoughts of self harm or concerns\".   I discussed the research on gratitude journals and expressed she has seemed like she has an \"attitude of gratitude\".  She said she feels blessed and hopes she does.  on likert scale 1-low and 10 -high:   Anxiety:  0  stress: 0 depression 0  issues to discuss and Helpful coping: I am learning to say no, and that boundaries are good    wk of 6/26  Attended MH skills group with Darinel Au.    She participated in psychoeducation on Core beliefsThis topic will discuss core beliefs, as a lens through which " "we see life and how that affects our choices.  Virginia said the discussion on core beliefs is something she will spend some time on, as the awareness is helpful.  I told her we will follow up on Wednesday with more that can be helpful when we notice harmful core beliefs.   \"no thoughts of self harm or concerns\"  on likert scale 1-low and 10 -high:   Anxiety:  1 stress: 1 depression 0  issues to discuss and Helpful coping: learning about self, anxiety and stress  wk of 6/19 \"no thoughts of self harm or no concerns\".  She reports on likert scale 1-low and 10 -high:   Anxiety:  1  stress: 1 depression 0  issues to discuss and Helpful coping: getting to know people better in group, hearing other's helpful coping.  Attended MH skills group with Darinel Au.  We discussed anticipating some situations and \"gearing up\" by practicing deep breathing. I reminded client of previous discussions where beliefs often guide how we feel about certain situations, but that we can reframe them, or look at them for the particular situation.      Wk of 6/12  I am feeling much better and less anxiety about group attendance as I continue to attend and get to know others. \"no thoughts of self harm or concerns\". On likert scale 1-low and 10 -high:   Anxiety:  1  stress: 1 depression 1  issues to discuss and Helpful coping:  Breathing, getting to others in group  Wk of 6/6 \"no thoughts of self harm\"  Attended MH skills group with Darinel Au  Week of 5/31/23 :Client reports feeling anxious due to first treatment experience, not knowing what to expect, her  Mother's declining health-needing a placement for higher level of care, spouse recently lost his job-need to obtain COBRA insurance and unable to care for grandchildren because of attending treatment. Client was saw Traci Aguilar and has not seen them in about 6 weeks. To follow up with primary counselor re: seeing psychotherapist or return to [facility].  Client rates the " "following on a scale of 1 (low) to 10 (high):  Anxiety- 5  Depression -1  Stress- 5 as a result of above concerns.   Client participated in psychotherapy/education on self-care, introduction to relaxation meditation-tensing and relaxing muscles, and sleep hygiene.  Current Mental Health symptoms include: . Active interventions to stabilize mental health symptoms this week :   At SI her PHQ-2 score was 0 and his TAINA-2 score was 1.  PROMISE 10 was  31:  She reports that at this time she is dealing with her mother's living situation and health issues. She reports that her father has passed away and that she is estranged from 1 sister.         Dimension 4: Treatment Acceptance / Resistance -   Previous Dimension Ratin  Current Dimension Ratin  RENEE Diagnosis:  Alcohol Use Disorder   303.90 (F10.20) Severe In a controlled environment  Commitment to tx process/Stage of change- contemplation  RENEE assignments - see tx plan      Narrative - wk of   on likert scale 1-low and 10 -high:  motivation for sobriety: 10   WK of -motivation 10 on Likert scale.she expressed strong commitment to sobriety, see notes dated  for more details.  wk of :  on likert scale 1-low and 10 -high:  motivation for sobriety: 10+  wk of 7/10  My motivation for sobriety is 10/10\".  I think it is so important to attend and be dedicated to support in this group, and to \"say things outloud, so it takes the power away, as you say, Alana\"  wk of 7/3  on likert scale 1-low and 10 -high:  motivation for sobriety 10.  Virginia said  session really stuck with her and she thought about a lot of things and is really understanding why attendance and building on the days and lessons is important.     wk of   sober support motivation:  2, but I think motivation will grow as I keep settling in and I am motivated 10/10 for sobreity.    Wk of :  Client reports motivation for sobriety is 10 (high) Motivation for attending community " "support 5 (on scale 1 low-10 high).   Virginia expressed to her group how helpful they are to her and how much she appreciates all that members shared about their stories.  She said she gains such helpful insight for self   Week of  Virginia discussed that she has been late a couple times, but realized with 2 other members gone, how this speaks to the importance of being on time and valuing and respecting others time, too.   She will also miss tomorrows group due to a long awaited medical appt. Dim 4: on likert scale 1-low and 10 -high:  Motivation for sobriety:  10  motivation for sober support attendance: not doing yet  Wk of   Virginia also said she finds motivation for sobriety in her family and friends, \"but really deep down need to do this for myself\"  Told counselor and group how she didn't think she would get much out of tx and already is getting so much and is grateful for older adult group   Week of 23: Client reports motivation for sobriety is 10 (high) Motivation for attending community support 5 (on scale 1 low-10 high).  The patient is motivated, to explore treatment.  She said \"of course I am partly here to fulfill requirements for liver transplant, but I want to learn healthy coping for lifelong sobriety.      Dimension 5: Relapse / Continued Problem Potential -   Previous Dimension Ratin  Current Dimension Ratin  Relapses this week - None  Urges to use - None  UA results -   No results found for this or any previous visit (from the past 168 hour(s)).  Using ReSet Clem: N/A    Narrative-  wk of    cravings, warning signs, triggers: None this week   WK of -client denies triggers, cravings or warning signs this past week.  wk of :  cravings, warning signs, triggers:i went to a musical festival, had a few cravings, but also had insight when watching others use, \"glad it isn't me\"  Wk of 7/10 Had a PeTH test , see above. No issues. She discussed the Serenity Prayer and that she " "finds it helpful.  She also discussed having cravings at a 4 of 10 when cooking, and what she found helpful:  Processed, talked with , talk with group today about it.   Virginia said she really had quite a learning week with events in group, discussions and assignments.  \"My heart goes out to the new person the other day, but also I had some anger about all the disruptions and negativity from her\"  \"I am ready to move on, and learn what I learned, and know that could be any one of us\"  wk of 7/5  cravings, warning signs, triggers: none.  My adult kids are caring and asked if I thought I would be OK going to [facility], as there is drinking there.  She told them she appreciates the concern, but feels with them and little to no triggers, for quite some time, she didn't feel it was a problem.  Virginia said she had a wonderful time with her family and is not concerned about use over 4th of July holiday either, she has a good plan for spending time with family and focusing on good food.   wk of 6/26  Dim 5: cravings, warning signs, triggers: none.  Wed reading was focused blame.  It said blaming is hiding, that when we blame others we try to hide our character defects.  We discussed the meaning of character defects. \"I was in big denial for a long time, it took being told I would need a new liver for me to really realize what drinking was doing. \"it hit me like a ton of bricks when I finally really heard it\"  wk of 6/19  She participated in  Psychoeducation/Skills: Self-Care.  Client s completed self-care assessment and described self-care as feeling better physically. Client identified physical self-care they do well- attending medical appointments, one they do poorly or needs improvement- eating healthy food and barriers that prevent themselves from self-care - cost of food is expensive, the shopping and prep.  This topic will give a general overview of self-care: physical, environmental, emotional, social and " "spiritual. It will explore importance of self-care and the impact on recovery.   Wk of . Dim 5: warning signs, triggers and cravings: none   helpful coping: attending group. Examples of benefits of drinking or drug use, or other behaviors Physical, Social mental or emotional: confidence, escape from reality, \"friends\"     Main drawbacks connected to these: not real friends that only want to drink together.  Headaches and slurred speech.  Remoarse  Benefits connected to abstinence from addictive behaviors:more able to do me and be me, I enjoy trud friends, I remember things better and will have better memories      Drawbacks you see connected with abstinence from these:  Wk of she participated in the daily reading and said she liked it.  Virginia also gave meaningful feedback to client who discussed use episode: \"it could be anyone of us\"  She said this is important and she is learning from others sharing.  Week of 23: Client denies riggers or warning signs this past week. She said she liked the saying \"it is just as easy to create good habits as bad ones\"   Patient reports family members and her  are concerned about their substance use.  Patient reports their recovery goals are \"abstinence forever.\"  She has had no previous detoxes or treatments and needs to gain insight into healthy coping for relapse prevention.      Dimension 6: Recovery Environment -   Previous Dimension Ratin  Current Dimension Ratin  Family Involvement - not at this time  Summarize attendance at family groups and family sessions - NA  Family supportive of treatment?  Yes    Community support group attendance - no  Recreational activities - some camping, cooking and boating    Narrative - wk of   sober support motivation:  I have so much going on with medical and this meeting 3x per week, that realistically, I will not likely look into community support until after my surgery at the end of the month.   WK of " "7/24-client's support are her family, she is not attending support groups and motivation to attend is 2 on Likert scale. Client shared she has learned this week is this is a continuous journey and staying positive is huge. To support her sobriety this week she will continue to surround herself with good people. She is grateful for home, shelter and stability.  wk of 7/17:  sober support motivation: this group and my family.     wk of 7/10: \"I am motivated for outside support at 2/10, but when I get done with Tuesday group, I think my motivtiaon will be higher\"  wk of 7/5   We discussed \"The power of Solitude\" and how spending time alone with ourselves may not be easy or even desirable, it it is key to getting to know self.  I emphasized that studies tell us we need each other to survive and be happy, but also when we loose the ability to be alone with ourselves, sometimes our overstimulated nervous systems suffer from no place to rest and recharge.  She said this is good to discuss, as she may even consider doing it a bit more, as it \"really does recharge me\" She said she is pretty good about finding alone time and also uses journaling.  I asked client how this applies to recovery and treatment:She said she will often take a topic from group, or a question and journal about.  She said it really helps   Wk of 6/26. She said sober support motivation is  2, but I think motivation will grow as I keep settling in  I talked with clients about taking responsibility for our actions, so we can be empowered and own our life and also change if we need to. We discussed resources including AA, SMART Recovery.  I encouraged clients to start exploring meetings.  Wk of 6/19.  Virginia reports with having group 3x per week, it is a good deal of support, \" I will continue to learn about peer support\"  wk of 6/12client discussed resources sober support, including AA, Liver Transplant support and [place] recovery connection  wk of 6/6  goals " "for the wknd: look into volunteering at [facility] for writing card.  \"no community support attendance at this time\"   Client is not currently attending community sup[ort and does not have a sponsor. Client's supportive people are her family. Client shared she has friends and has not talked with many about her current situation, because \"I am a private person,\" (treatment and needing transplant).  Patient reports they are involved in community of holden activities    They reports spirituality impacts recovery in the following ways:  \"There was some changes as far as .  We do belong to a [employer] but it's been a number of years since we've been.\"  Patient believes her spirituality and sobriety are connected. Patient reported having 3 children, all adults.  Patient has 2 grandchildren.    She lives with her spouse and son. recreational/leisure activities: camping, cooking, boating, playing board games and card games.  Patient is currently disabled due to her Crohn's Disease.  Patient reports their income is obtained through SSDI disability; spouse.  Patient does not identify finances as a current stressor.         Progress made on transition planning goals: just began tx    Justification for Continued Treatment at this Level of Care:  No previous tx, needs 6 months of sobriety to be eligible for a transplant, but is having some medical issues that she ocontinues to work on with her medical team needs to learn long term relapse prevention, she reports finding that attending group is helpful for her and she is learning.She said she did not think she would get much out of this but is getting so much support and understanding of healthy coping.  She is not attending community support, at this time.  She is working on goals and assignments.  She gets regular PEth  tests, and there have been no indications of alcohol use.    Treatment coordination activities this week:  7/31 reviewed Onelia Singh notes and discussed client with " her.  7/18  Discussed client info with margaret Singh   Discussed client info with margaret Singh  Need for peer recovery support referral? No    Discharge Planning: Not at this time      Has vulnerable adult status change? No    Interdisciplinary Clinical Supervision including:  no    Are Treatment Plan goals/objectives effective? Yes  *If no, list changes to treatment plan:    Are the current goals meeting client's needs? Yes  *If no, list the changes to treatment plan.  Plan:  Continue per Master Treatment Plan    *Client agrees with any changes to the treatment plan: Yes  *Client received copy of changes: Yes  *Client is aware of right to access a treatment plan review: Yes     Staff Members Contributing To Weekly Review:   SOLANGE Reese, MS, SOLANGE, LPC  Lead Counselor Adult [facility] Darinel uA, DOUG, LMFT, SOLANGE, CGTP-[place] [facility]

## 2023-07-31 NOTE — TELEPHONE ENCOUNTER
Spoke with Yolande Qureshi RN [facility], she confirm receipt of predictr pK lab orders to be drawn prior to infusion on 7/2/23.

## 2023-07-31 NOTE — GROUP NOTE
"Group Therapy Documentation    PATIENT'S NAME: Abiola Matute  MRN:   [id number]  :   1965  ACCT. NUMBER: [id number]  DATE OF SERVICE: 23  START TIME: 12:00 PM  END TIME:  2:00 PM  FACILITATOR(S): Judi Dodd LADC  TOPIC: BEH Group Therapy  Number of patients attending the group:  3  Group Length:  2 Hours    Group Therapy Type: Addiction and Psychoeducation    Summary of Group / Topics Discussed:    Anger/Conflict Management , Choices in Recovery, and Journaling      We checked in on logistics and things, due to this counselor having been on vacation.   Clients did opening reading from \" A time for Amelie\" and discussed how picking up a drink or drug was a way to escape, a way to block feelings and hide pain.  I had clients take 2 min and write down all they can do, instead of using.  Clients shared and we made a big list.  I then emphasized to clients how staying in the now and knowing it is ok to feel, can be empowering and also leave room for choice.  We discussed how addiction often means living more in high's and lows, whereas learning to live without use often is awkward at first because it is more about living \"middle ground\" and not seeking the highs or trying to climb up from the lows.  Clients expressed what they have notices about this for self.  We also briefly discussed PAWS and how that may contribute. Clients all checked in on dimensions    Attestation: Chris Juares MD - Medical Director - Provides oversight and supervision of care.         Group Attendance:  Attended group session    Patient's response to the group topic/interactions:  discussed personal experience with topic, expressed readiness to alter behaviors, expressed understanding of topic, and listened actively    Patient appeared to be Actively participating and Attentive.          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 She said she wanted to share " "that she has many feelings associated with this, \"it even could mean I won't need a LT, but that remains to be seen\"  I told Virginia she has been shown how with our spirituality and belief, sometimes things can happen that were never anticipated.  She said \"so true\" I never had an idea that was even a part of my hope\"  Today's session focussed on check in of all dimensions and  dim 2 medical, dim 6 spirituality, and dim 5 cycle of addiction   Client said of the reading: I am learning about my feelings and how it is different than \"mood altered\" feelings.  I reminded client that feelings themselves are not good or bad, what we do with them is often more what is \"good or bad\".  I also emphasized that others reactions or responses often create shame or other opinions of self, but that we can pause and figure out where we are, and realize when we are reacting (possibly out of shame) rather than responding.    Dim1: ASHISH: no change reported  Dim2: medical issues or appts: see above  Dim 3: \"no thoughts of self harm or concerns\"  on likert scale 1-low and 10 -high:   Anxiety:2   stress: 1   depression 0  issues to discuss and Helpful coping: prayer, talking with others, attending group, a good medical team  Dim 4 on likert scale 1-low and 10 -high:  motivation for sobriety: 10  Dim 5: cravings, warning signs, triggers: None this week  Dim 6 sober support motivation:  I have so much going on with medical and this meeting 3x per week, that realistically, I will not likely look into community support until after my surgery at the end of the month.    What I have learned about myself or my recovery this last week: I am more comfortable with myself, it is Ok to ask for help, \"as a matter of fact, it is mandatory\".    P) Follow medical recommendations.  Do assignment.    Attestation:   Jonathan Juares MD - Medical Director - Provides oversight and supervision of care.     Judi Dodd, MS, [facility], " LPC

## 2023-08-01 ENCOUNTER — HOSPITAL ENCOUNTER (OUTPATIENT)
Dept: BEHAVIORAL HEALTH | Facility: CLINIC | Age: 58
Discharge: HOME | End: 2023-08-01
Attending: FAMILY MEDICINE
Payer: COMMERCIAL

## 2023-08-01 PROCEDURE — H2035 A/D TX PROGRAM, PER HOUR: HCPCS

## 2023-08-02 ENCOUNTER — PATIENT OUTREACH (OUTPATIENT)
Dept: GASTROENTEROLOGY | Facility: CLINIC | Age: 58
End: 2023-08-02

## 2023-08-02 ENCOUNTER — HOSPITAL ENCOUNTER (OUTPATIENT)
Dept: BEHAVIORAL HEALTH | Facility: CLINIC | Age: 58
Discharge: HOME | End: 2023-08-02
Attending: FAMILY MEDICINE
Payer: COMMERCIAL

## 2023-08-02 PROCEDURE — H2035 A/D TX PROGRAM, PER HOUR: HCPCS | Mod: HQ

## 2023-08-02 NOTE — ADDENDUM NOTE
Encounter addended by: Darinel Au LMFT, [facility] on: 8/2/2023 7:51 AM   Actions taken: Clinical Note Signed

## 2023-08-02 NOTE — GROUP NOTE
"Group Therapy Documentation    PATIENT'S NAME: Abiola Mattue  MRN:   [id number]  :   1965  ACCT. NUMBER: [id number]  DATE OF SERVICE: 23  START TIME: 12:00 PM  END TIME:  2:00 PM  FACILITATOR(S): Judi Dodd [facility]  TOPIC: BEH Group Therapy  Number of patients attending the group:  3  Group Length:  2 Hours    Group Therapy Type: Addiction and Psychoeducation    Summary of Group / Topics Discussed:    Boundaries, Cognitive Therapy Techniques, Emotional Regulation, and Self-Care Activities      Attestation:   Jonathan Juares MD - Medical Director - Provides oversight and supervision of care.     Today clients did daily meditation from \"A time for Amelie\" by Shaina Vang.  reading on how fear and negative tapes often step in when we know it is time for change.  We discussed putting energy into new and positive habits like abstinence and group attendance.  We discussed how group support can help in times of change or when negative tapes play.  A new group member brought up that she has been craving sugar lately, so we briefly discussed the science of that, which also lead to a brief discussion of cross addiction.  Clients and Counselor gave examples and discussed the addictive brain.  We went over what each person will do to have structure and sobriety in their weekend.  Group discussed psychoeducation below.        Topic: Understanding my anxiety    This topic will help client to gain awareness of thoughts and feelings associated with anxiety and act with awareness when feeling anxiety and use helpful coping    Objective(s):      Clients will identify some anxious thoughts they regularly have  Clients will identify physical feelings they have when experiencing anxiety  Clients will learn how to notice and process with awareness and choose healthy coping    Structure (modalities, homework, worksheets, etc):   Receive a handout on \"my anxiety profile\"  Group discussions, sharing personal experiences and " "feedback to fellow peers   Identify at least one way clients can practice being more mindful.      Expected therapeutic outcome(s):    Client  will:   List physical sensations you feel when experiencing anxiety  List anxious thoughts you have when experiencing anxiety  Be able to increase awareness of Noticing  and acting with awareness     Therapeutic outcome(s) measured by:      Clients ability to identify anxious thoughts, physical feelings when having anxiety.  Client will identify 2 helpful coping methods  Client will, actively participate in group discussions,         Group Attendance:  Attended group session    Patient's response to the group topic/interactions:  cooperative with task, discussed personal experience with topic, and expressed readiness to alter behaviors    Patient appeared to be Actively participating, Attentive, and Engaged.        Clients specifice information :  Virginia said she gets an infusion on Monday so she may be a few minutes late to group.  She said she is still dealing with the news of possibly not needing a Liver transplant if they can get all the cancer.  She said this was never mentioned to her before this last week and she said I feel \"why me\" in the sense of how did I get luck to get this good information when others are struggling.Group encouraged her to take it in and be grateful.  She said she really wants to embrace it but almost feels remoarsful about others she is aware of that are on the list.   Today's group focused on dim 3, understanding anxiety and dim 5 cross addiction, Science of addiction.  Comments and takeaways on the reading: This reading fits with my information about my health, even with good news, fears and negative tapes set in (see above) and I found it hard to focus, thinking I am not deserving of good news.  Plan for structure and sobriety for the weekend: lots of good time outside and with family and friends.    Client did actively participate in group " "discussions, and was able  to identify anxious thoughts, physical feelings when having anxiety: racing heart, shaking, confusion  \"why me\"  She was able to  identify 2 helpful coping methods: breath, talk with her  who is a comfort, stop the story telling/negativity when aware      P) Practice gratitude and allowing the good news in.  follow plan of structure, client will be late on Monday, \"but not much\".    Attestation:   Jonathan Juares MD - Medical Director - Provides oversight and supervision of care.         Judi Dodd, MS, LADC, LPC        "

## 2023-08-02 NOTE — TELEPHONE ENCOUNTER
Yolande - RN called, pt rescheduled her infusion due to pt's schedule. She will be getting her labs completed in clinic on 8/4/23. Edit lab not to ask lab to draw predictr pK.    Sent [employer] message with reminder to get predictr pK drawn as well.

## 2023-08-02 NOTE — PROGRESS NOTES
"Attestation: Chris Juaers MD - Medical Director - Provides oversight and supervision of care.    Individual Session Summary In Lieu of Group   START TIME: 12:00PM   END TIME: 12:59PM   Duration: 1 Hour    Abiola Matute is a 58 year old female who is being evaluated via a in-person visit.      Note: Due to only 2 group member(s) in attendance, we did an individual session today, in lieu of group, due to policy.     Data: Met with Abiola on this date for an individual session in lieu of group due to policy. The session focused on DIM(s) 3 and 5 of her individual treatment plan. Abiola denies any suicidal thoughts, plans, or intent today. Abiola also denies any thoughts or behaviors of self-harm today. On a 0-10 Likert Scale (0=No issues/symptoms & 10=worst issues/symptoms), Abiola checked-in with rating her depression as a \"0\", anxiety as a \"2\", and rated her cravings as a \"0\". On a different 0-10 Likert Scale (0=low motivation and attendance & 10=highest level of motivation and attendance), Abiola rated her motivation for sobriety as a \"10\", and lastly Abiola rated her attendance over this past week as a \"10\".  Overall Abiola reported feeling \"encouraged\" today. Abiola reported being grateful for \"results/news of my appointment Friday\". Abiola was asked what strength she has that she can use to strengthen her recovery, in which Abiola stated, \"fear but in a positive way\". Today's session focused on Dimension(s) 3 & 5. Abiola showed progress by being able to teach-back the skills she learned during today's session.     > Abiola learned about and gained greater insight into  Learning to Handle Frustration .     > Abiola learned that throughout life we are often irritated by people, annoyed by delays, and frustrated when things do not go our way. We too often display our irritation and live our lives almost constantly upset. Being frustrated from time to time is a common fact of life. We all " "experience frustration when we face seemingly unnecessary complications or a complete roadblock on the path to our goal. Everyone gets frustrated. Low frustration tolerance is having oversized or extreme reactions to normal stressors and inconveniences of everyday life. You might feel bothered by inconsequential annoyances and become angry, irritated, and upset.  Abiola was asked, in general, over the past two months, how would she rate her level of frustration? Low 1-------------------------------------- 5-------------------------------------- 10 High. Abiola stated, \"2.\"  Abiola was also asked, over the past two months, how would she rate her level of tolerance for the frustration? Low 1--------------------------------- 5-------------------------------- 10 High. Abiola stated, \"5.\"    We Want Our Way  > Abiola learned that we do not want to be stuck in traffic or forced to wait for a doctor. We want fast service, respectful treatment, and have everything easily fall into place. You may be annoyed by the olinda who does not use his turn signal or the  who does not know where anything is in the store. You want the best bed, greatest food, nicest clothes, best price, and no hassle. When you don't get your way, you may find yourself moaning, complaining, yelling, screaming, or swearing. You are upset and angry. You may feel disrespected by the inconsiderate and blocked by the unintelligent. Many feel justified in their overreaction.  Things should not be this way, and I am going to be upset that they are.  Being anxious or depressed increases your sensitivity and reactivity. This reactivity is made worse when you are not sleeping well or your routine has been disrupted. However, developing a high frustration tolerance is vital to feeling relaxed and at peace. Remember, you won't be able to control all that frustrates you, but you can learn to manage small irritations, annoyances, and frustrations better.  > " "Abiola was then asked what common beliefs and/or thoughts does she have that increase or contribute to her frustration? Abiola stated, \"I can't stand slow service, stupidity, or rude treatment, and that things should be in order like my spices and light switches.\"  Low Frustration Tolerance Creates Problems  > Abiola learned that everyone goes through frustrating situations. People with low frustration tolerance often overreact and fall apart when inconvenienced. They also have a skewed and inaccurate perspective, filled with distorted and personalized interpretations of what just happened. Difficult situations are seen as dreadful, unbearable, and horrendous. Many people use short-term methods such as alcohol, drugs, over-eating, shopping, gambling, sex, and other distractions to reduce or avoid the pain. Low frustration tolerance is a key factor in the development of mental health problems and substance use disorders. Using any unhealthy coping strategy may allow you to escape a painful situation at first but may bring other complications and add to your overall frustration level down the road. Abiola was asked what are some unhealthy ways she has tried to cope with frustrating circumstances in the past? Abiola stated, \"I used to drink alcohol.\"  Grumbling and Complaining  > Abiola learned how low frustration tolerance commonly leads to distress over minor setbacks and inconveniences. Negativity, fault-finding, and complaining are common. Many people become overly concerned and highly reactive because something unfair occurred. Comparison of your situation to someone else's will only increase frustration. There may be an intense focus on the unfairness of your treatment. Complaining and highlighting this unfair treatment often pushes other people away and increases a sense of alienation from others. When people feel overlooked, insulted, and unfairly treated, they may become angry, aggressive, and hostile. " "These aggressive actions may be justified in your mind because someone did something you did not like or failed to give you what you wanted. Abiola was asked how does she typically respond when she is slighted, overlooked, or treated unfairly? Abiola stated, \"I would use manipulation.\"  Destructive Reactions  > Abiola learned how they may have seen someone upset and frustrated over poor service in a restaurant.  He was not served in a timely manner, the  was grumpy, and the food was served cold.  At that point, the customer in the restaurant states  I can't stand it anymore!  He comes unglued and throws an adult-version temper tantrum, complete with yelling, swearing, and name-calling. He is offended and angry. In this example, there are real problems, actual slights, and inconveniences. The customer feels entitled by these realities to react with rude, disrespectful, and immature attitudes and behavior.  Tolerating Discomfort  > Abiola learned that low frustration tolerance is closely linked to low discomfort tolerance. When a difficulty is experienced, a person may feel negative and uncomfortable emotions, which are perceived as extremely painful, unbearable, and intolerable. Frustration is uncomfortable and discomfort is frustrating. You may have had low frustration tolerance and low discomfort tolerance modeled by parents, caregivers, siblings, or peers. You have watched other people get what they wanted by exaggerating their frustrations and complaining about it. Maybe you were rewarded for forceful or aggressive behavior. People often give in to someone who is being rude, oppositional, and demanding. Over time, a style of complaining or being aggressive may develop and continue because these tactics seem to work; however, the behavior is ultimately harmful to you and others. Abiola was asked, when thinking back to her younger years, how did her family members and friends handle frustrating events? " "Abiola stated, \"they just buried it and not deal with it.\"  Increasing Our Frustration Tolerance  > Abiola learned how frustration tolerance can be developed through changing the way you think. You are learning to work through, or work down, difficulties and problems, rather than letting them get you worked up. Offenses can be pardoned, inconsistencies tolerated, oversights forgiven, and faults and mistakes overlooked. You often cannot change a particular situation, but you have the ability to change your beliefs and reduce your level of frustration. Don't bother trying to avoid everything that bothers you. Instead, learn to tolerate frustration and work down bothersome situations. Not only can you accept responsibility for your own attitudes and actions, but you are able to change the self-centered thoughts and reactivity that often result in frustration and disappointment.  Challenge Thoughts  > Abiola was asked to provide some examples of positive statements that would help her stay emotionally regulated and calm? Abiola stated, \"this is frustrating but not impossible, and when I tolerate, overcome, and endure, I get stronger.\"  Putting It into Practice  > Abiola learned that in order to increase frustration tolerance, some practice may be helpful. You can drive in the slowest regina or let someone go ahead of you in the checkout line. You might choose a more scenic, but inconvenient, route. Practice managing your own level of frustration without being inconsiderate, rude, or sarcastic. Even when someone is slow or disrespectful, be considerate and respectful. Be constantly aware of your mood, attitude, and disposition. Give up your desire to use frustration and aggression to get what you want. When difficulties arise, practice letting go of the offense, overlooking slights, and forgiving mistakes. Abiola was asked to provide an example of something that commonly cause her to be more frustrated than she wants " "to be. Abiola stated, \"when things aren't in a certain order like my spices or even light switches.\" Abiola was asked to write a new response for the previous answer that she can use in the future. Abiola stated, \"I just need to let it go.\" Abiola was asked for an example of a way she can practice maintaining control and increase their frustration tolerance. Abiola stated, \"my maintaining my sense of humor.\" Abiola learned that as she practices, she will get stronger. This is not about how to get your way, but instead how to succeed in life. Highlight what you liked about an experience rather than complaining about what you did not. You can change your character by increasing frustration tolerance, and in turn, strengthen your emotional health.  >On a 0-10 Likert Scale (0=lowest confidence & 10=most confidence), Abiola was asked to rate her confidence with using the skills learned on  Learning to Handle Frustration  to help her strengthen her recovery both with MH and [facility]. Abiola stated her rating was a \"9\".   Intervention: Individual session with Abiola in lieu of group due to policy around not enough group members present to hold group. Provided Abiola with verbal interventions including a lot of validation, support, active and reflective listening, genuine positive regard, and psychoeducation.      Assessment: Abiola presents and appears engaged, insightful, motivated, and willing.  Abiola reports taking proactive steps to reduce mental health symptoms and stressors in her life. Abiola appears very open and willing to learn new skills that will help her maintain lifelong recovery.     Plan: Abiola will reflect on her daily experiences and will highlight/focus on what she liked rather than what she did not like each day.     I have reviewed the note as documented above.  This accurately captures the substance of my visit with this client.      Darinel Au, DD, LMFT, LADC, CGTP-[place]  Licensed MICD " Psychotherapist  [facility]  P: [phone] I F: [phone]

## 2023-08-03 ENCOUNTER — MEDICAL CORRESPONDENCE (OUTPATIENT)
Dept: HEALTH INFORMATION MANAGEMENT | Facility: CLINIC | Age: 58
End: 2023-08-03
Payer: MEDICARE

## 2023-08-03 ENCOUNTER — DOCUMENTATION ONLY (OUTPATIENT)
Dept: GASTROENTEROLOGY | Facility: CLINIC | Age: 58
End: 2023-08-03
Payer: MEDICARE

## 2023-08-04 ENCOUNTER — LAB (OUTPATIENT)
Dept: LAB | Facility: CLINIC | Age: 58
End: 2023-08-04
Payer: COMMERCIAL

## 2023-08-04 DIAGNOSIS — K50.113 CROHN'S DISEASE OF LARGE INTESTINE WITH FISTULA (H): ICD-10-CM

## 2023-08-04 LAB
ALBUMIN SERPL BCG-MCNC: 3.6 G/DL (ref 3.5–5.2)
ALP SERPL-CCNC: 125 U/L (ref 35–104)
ALT SERPL W P-5'-P-CCNC: 33 U/L (ref 0–50)
AST SERPL W P-5'-P-CCNC: 66 U/L (ref 0–45)
BASOPHILS # BLD AUTO: 0 10E3/UL (ref 0–0.2)
BASOPHILS NFR BLD AUTO: 1 %
BILIRUB DIRECT SERPL-MCNC: 0.53 MG/DL (ref 0–0.3)
BILIRUB SERPL-MCNC: 1.6 MG/DL
CRP SERPL-MCNC: 7.03 MG/L
EOSINOPHIL # BLD AUTO: 0.3 10E3/UL (ref 0–0.7)
EOSINOPHIL NFR BLD AUTO: 9 %
ERYTHROCYTE [DISTWIDTH] IN BLOOD BY AUTOMATED COUNT: 13.8 % (ref 10–15)
ERYTHROCYTE [SEDIMENTATION RATE] IN BLOOD BY WESTERGREN METHOD: 54 MM/HR (ref 0–30)
HCT VFR BLD AUTO: 38.6 % (ref 35–47)
HGB BLD-MCNC: 13.2 G/DL (ref 11.7–15.7)
IMM GRANULOCYTES # BLD: 0 10E3/UL
IMM GRANULOCYTES NFR BLD: 0 %
LYMPHOCYTES # BLD AUTO: 0.8 10E3/UL (ref 0.8–5.3)
LYMPHOCYTES NFR BLD AUTO: 22 %
MCH RBC QN AUTO: 34.7 PG (ref 26.5–33)
MCHC RBC AUTO-ENTMCNC: 34.2 G/DL (ref 31.5–36.5)
MCV RBC AUTO: 102 FL (ref 78–100)
MONOCYTES # BLD AUTO: 0.3 10E3/UL (ref 0–1.3)
MONOCYTES NFR BLD AUTO: 10 %
NEUTROPHILS # BLD AUTO: 2.1 10E3/UL (ref 1.6–8.3)
NEUTROPHILS NFR BLD AUTO: 59 %
PLATELET # BLD AUTO: 105 10E3/UL (ref 150–450)
PROT SERPL-MCNC: 7.4 G/DL (ref 6.4–8.3)
RBC # BLD AUTO: 3.8 10E6/UL (ref 3.8–5.2)
WBC # BLD AUTO: 3.5 10E3/UL (ref 4–11)

## 2023-08-04 PROCEDURE — 80230 DRUG ASSAY INFLIXIMAB: CPT | Mod: 90

## 2023-08-04 PROCEDURE — 86140 C-REACTIVE PROTEIN: CPT

## 2023-08-04 PROCEDURE — 80076 HEPATIC FUNCTION PANEL: CPT

## 2023-08-04 PROCEDURE — 82542 COL CHROMOTOGRAPHY QUAL/QUAN: CPT

## 2023-08-04 PROCEDURE — 85652 RBC SED RATE AUTOMATED: CPT

## 2023-08-04 PROCEDURE — 36415 COLL VENOUS BLD VENIPUNCTURE: CPT

## 2023-08-04 PROCEDURE — 99000 SPECIMEN HANDLING OFFICE-LAB: CPT

## 2023-08-04 PROCEDURE — 85025 COMPLETE CBC W/AUTO DIFF WBC: CPT

## 2023-08-07 ENCOUNTER — HOSPITAL ENCOUNTER (OUTPATIENT)
Dept: BEHAVIORAL HEALTH | Facility: CLINIC | Age: 58
Discharge: HOME | End: 2023-08-07
Attending: FAMILY MEDICINE
Payer: COMMERCIAL

## 2023-08-07 PROCEDURE — H2035 A/D TX PROGRAM, PER HOUR: HCPCS | Mod: HQ

## 2023-08-07 NOTE — GROUP NOTE
"Group Therapy Documentation    PATIENT'S NAME: Abiola Matute  MRN:   [id number]  :   1965  ACCT. NUMBER: [id number]  DATE OF SERVICE: 23  START TIME: 12:00 PM  END TIME:  2:00 PM  FACILITATOR(S): Judi Dodd LADC  TOPIC: BEH Group Therapy  Number of patients attending the group:5  Group Length:  2 Hours    Group Therapy Type: Addiction    Summary of Group / Topics Discussed:    Communication, Mindfulness, Relationships, Relaxation Techniques, and Stress Management      We checked in on logistics and new client introduced herself and told group what brought her to tx.   Clients did opening reading on being in the moment.  Group check in's took most of the hour and along the way group discussed such things as sober support, people places and things that can cause cravings or be triggers.  3 of the group members have not changed many of their social events or people they hang out with, who are often using.  We discussed pro's and con's of this, what is healthy and what might not be, and also how planning is vital.  Most of these are good friends and family members, but we talked about how it can still be triggering.  We also discussed \"changing the channel\" exercise for dealing with anxiety.  Clients received a handout and discussed how this could be helpful.      Attestation: Chris Juares MD - Medical Director - Provides oversight and supervision of care.             Group Attendance:  Attended group session    Patient's response to the group topic/interactions:  cooperative with task, expressed readiness to alter behaviors, and listened actively    Patient appeared to be Actively participating, Attentive, and Engaged.          Client specific details:Today's session focussed on check in of all dimensions and  dim 5 warning signs, triggers and cravings, as well as sober support, dim 6.    Dim1: ASHISH: no change reported as ,  Dim2: medical issues or appts:  Dim 3: \"no thoughts of self " "harm or concerns\"  on likert scale 1-low and 10 -high:   Anxiety:  2  stress: 1 depression 2  issues to discuss and helpful coping: Virginia said that a trigger for her [place]. \"I haven't been there since starting tx\"  My  and I just kept walking and he also said \"I got you\".  She said her cravings only lasted a couple minutes.  We discussed how this can be common and some group members said they cannot go to the same places at all because of that kind of trigger.  She said \"I get it, and will continue to be aware of situations like this  Dim 4 on likert scale 1-low and 10 -high:    motivation for sobriety  Dim 5: cravings, warning signs, triggers: none  Dim 6 sober support motivation:  5    P)    Have plan and support when going out with people, places that can potentially cause trigger    Attestation:   Jonathan Juares MD - Medical Director - Provides oversight and supervision of care.     Judi Dodd, MS, LADC, LPC                                                 "

## 2023-08-08 NOTE — TELEPHONE ENCOUNTER
----- Message from SOLANGE Grimaldo sent at 8/8/2023  8:58 AM CDT -----  Regarding: add 2 sessions (for Wed 8/9 and Monday 8/14]  Scheduling Request  add 2 sessions, (for Wed 8/9 and Monday 8/14]  Sadiq and I are in touch and client has these sessions available but Sadiq will be doing UR.       Patient Name:  STEVEN MCNAMARA [[id number]]  Location of programming: [place]    Group: [id number] on Monday, Tuesday, and Wednesday, at 12:00 pm: PM to 2:00 pm    Attending Provider (MD): Dr. Juraes,    Number of visits to be scheduled:  2   Duration of Appointment in minutes: 120 minutes  Visit Type: in person          Thank You,  Alana Dodd M.S., SOLANGE, LPC  Lead Counselor  [phone]

## 2023-08-09 ENCOUNTER — HOSPITAL ENCOUNTER (OUTPATIENT)
Dept: BEHAVIORAL HEALTH | Facility: CLINIC | Age: 58
Discharge: HOME | End: 2023-08-09
Attending: FAMILY MEDICINE
Payer: COMMERCIAL

## 2023-08-09 PROCEDURE — H2035 A/D TX PROGRAM, PER HOUR: HCPCS | Mod: HQ

## 2023-08-09 NOTE — GROUP NOTE
"Group Therapy Documentation    PATIENT'S NAME: Abiola Matute  MRN:   [id number]  :   1965  ACCT. NUMBER: [id number]  DATE OF SERVICE: 23  START TIME: 12:00 PM  END TIME:  2:00 PM  FACILITATOR(S): Judi Dodd LADC  TOPIC: BEH Group Therapy  Number of patients attending the group:  2  Group Length:  2 Hours    Group Therapy Type: Addiction    Summary of Group / Topics Discussed:    Balanced Lifestyle , Boundaries, Mindfulness, and Self-Care Activities      Attestation:   Jonathan Juares MD - Medical Director - Provides oversight and supervision of care.     Today clients did daily reading and did a few meditative breaths to get grounded and ready for group.  We briefly reviewed \"putting on the weather gear\"  from yesterday group.  Clients discussed any issues or concerns they have coming up and what they want to focus on over the weekend.  We also did psychoeducation below and ended group talking about self care, then doing a meditation on that topic and self kindness.    Topic: Thought Distortions    This topic will help client to gain awareness of Thought distortions    Objective(s):      Clients will identify a stressful situation they are going through  Clients will discuss the thought distortions they may experience   Clients will learn how to notice  thought distortions    Structure (modalities, homework, worksheets, etc):   Receive a handout on Thought Distortions  Discuss the 12 Distortions  Group discussions, sharing personal experience/stressful situation and possible distortions   feedback to fellow peers      Expected therapeutic outcome(s):    Client  will:   Gain insight and understanding of thought distortions to help with unhelpful thought patterns  Awareness of one's own patterns       Therapeutic outcome(s) measured by:      Participation in discussion  Client will, actively participate in group discussions,   Identify and discuss at least 1 situations in your own life and how " "distortions may have you in unhelpfulk thinking habit.  Discuss one things you want to do      Group Attendance:  Attended group session    Patient's response to the group topic/interactions:  cooperative with task, expressed readiness to alter behaviors, and expressed understanding of topic    Patient appeared to be Attentive and Engaged.        Clients specifice information :    Todays session focused on dime 3 Thought distortions This also focused dim 6 self care. Virginia said with her health being pretty good for how sick she is, she is finding the balance of having fun with also good self care.  She will be with family this weekend and it is \"good for my soul, and they are supportive of my sobriety\"    Virginia actively participated in discussion on Thought distortions and discussed which ones are the ones she actively wants to focus on for the time being: catastrophising and prediction.  She discussed  two situations where the techniques could be helpful, including: Should's and musts and not setting up unrealistic expectations of how she or life should be.  I want to remind myself \"until someone has walked a mile in my shoes, they don't get to  me and the same goes for me with them.  Virginia actively participated in meditation and said it was good to take that time to be.  \"I liked it\"    P)  use handout and yesenia down over the next 5 days, times you notice different thought distortions, and what your choice was when noticing .      Attestation:   Jonathan Juares MD - Medical Director - Provides oversight and supervision of care.     "

## 2023-08-09 NOTE — PROGRESS NOTES
[facility] Weekly Treatment Plan Review      Attestation:   Jonathan Juares MD - Medical Director - Provides oversight and supervision of care.       Date Span:Monday: 23 through 23      Date     Group Therapy 2 hours Completed these sessions 2 hours  0 hours 0 hours       Individual Therapy                Family Therapy                 Psychoeducation                 Other (Specify)                  Clients individual sessions noted below. Client had no absences this week    Client has no absences this week    Client has one excused absence this week , due to medical appts.  Client excused from group , due to medical appointment.     Program Running Totals: (No Phase 1 IOP due to this program being only OP level of care)  Total # of Phase 2 OP Group Sessions: 23 for 46   Virginia gets 30 sessions for 60 hours at this time  Counting her individual she has used 30  sessions and 53 hours  Total # of Phase 3 OP Recovery Management Group Sessions: None    Total # of 1:1 Sessions:  1 hour BALWINDER   1 hour 5/30  7/3,                                     Darinel , , ( & -Onelia)                    Weekly Treatment Plan Review     Treatment Plan initiated on: .    Dimension1: Acute Intoxication/Withdrawal Potential -   Previous Dimension Ratin  Current Dimension Ratin  Date of Last Use 22  Any reports of withdrawal symptoms - No      Dimension 2: Biomedical Conditions & Complications -   Previous Dimension Ratin  Current Dimension Ratin    Medical Concerns:wk of  No new concerns  wk of :  no, just good news that they may be able to remove all my tumore and I may not need a liver.  WK of -Client has an appointment with surgeon regarding increase in tumor. She is excused from programming due time conflict.  wk of  She had appt with radiology on , continues to work with her team  "due to LT, cancer and rash.  Virginia told group she is dealing with some news, medically, but working with her Drs.  She said she isn't wanting to share a whole lot, until she gethers her information, but she did want group to know she has support and appreciates just being able to share what she did  wk 7/10Jasmyn reports rash and working with , also had CT and MRI related to ongoing care for cancer and need for liver.  wk of 7/3  I get an infusion 1x per month and usually by week 3, I feel more sore and more tired, but I am grateful for the infusions.  \"no concerns beyond ongoing liver cancer and Crohns\"  wk of 6/12 She saw Jazmin Barrios at Urgent care for skin issues.  She also said she met with her GI practioner  She reports having liver cancer and Crohn's disease.  \"right now I am doing pretty well  Outside medical appointments this week (list provider and reason for visit)  Wk of 8/7 No appts this week   wk of 7/31: no appts.  wk of 7/24 she saw surgeon Junior Bonilla, she is dealing with her liver tumor  wk of 7/17 She had appt with radiology, Dr. Shah,on 7/14, continues to work with her team due to LT, cancer and rash  : wk of 7/6 she saw Susanna RAZA for rash on leg.  Had a PeTH test 7/7, see below. No issues  : 6/13 she saw Jazmin Meng PA-C for a flare up of her psoriasis likely related to her other medical issues, per client     Current Medications & Medication Changes:  Current Outpatient Medications   Medication    ammonium lactate (AMLACTIN) 12 % external cream    clobetasol (TEMOVATE) 0.05 % external cream    furosemide (LASIX) 40 MG tablet    inFLIXimab (REMICADE) 100 MG injection    Multiple Vitamins-Minerals (MULTIVITAMIN & MINERAL PO)    spironolactone (ALDACTONE) 100 MG tablet    triamcinolone (KENALOG) 0.1 % external cream     No current facility-administered medications for this encounter.     Facility-Administered Medications Ordered in Other Encounters   Medication    BREEZA " Lemon-Lime flavored oral liquid for Neutral Abdominal/Pelvic Imaging 1,000 mL    hyoscyamine (LEVSIN/SL) sublingual tablet 125 mcg     Medication Prescriber:  Linda Macdonald  Taking meds as prescribed? Yes  Medication side effects or concerns:  no        Dimension 3: Emotional/Behavioral Conditions & Complications -   Previous Dimension Ratin  Current Dimension Ratin  PHQ2:       2023     8:00 AM 2023     1:34 PM 2023    10:00 AM 3/28/2023    11:21 AM 3/26/2023     1:45 PM 2023     9:35 AM 1/3/2023     2:46 PM   PHQ-2 (  [employer])   Q1: Little interest or pleasure in doing things 0    0 0 0 0 0 0 0   Q2: Feeling down, depressed or hopeless 0    0 0 0 0 0 1 0   PHQ-2 Score 0    0 0 0 0 0 1 0   Q1: Little interest or pleasure in doing things Not at all   Not at all  Not at all Not at all   Q2: Feeling down, depressed or hopeless Not at all   Not at all  Several days Not at all   PHQ-2 Score 0   0  1 0      GAD2:       2023     9:54 AM 2023    10:00 AM 2023     8:00 AM   TAINA-2   Feeling nervous, anxious, or on edge 1 1 0    0    0    0    0   Not being able to stop or control worrying 1 0 0    0    0    0    0   TAINA-2 Total Score 2 1 0    0    0    0    0     PROMIS 10-Global Health (all questions and answers displayed):       2023     9:58 AM 2023     7:32 PM 2023    10:00 AM 2023     8:02 AM   PROMIS 10   In general, would you say your health is: Good Good  Good   In general, would you say your quality of life is: Good Good  Very good   In general, how would you rate your physical health? Fair Fair  Fair   In general, how would you rate your mental health, including your mood and your ability to think? Good Good  Very good   In general, how would you rate your satisfaction with your social activities and relationships? Good Good  Excellent   In general, please rate how well you carry out your usual social activities and roles Good Good  Very good   To  what extent are you able to carry out your everyday physical activities such as walking, climbing stairs, carrying groceries, or moving a chair? Completely Completely  Mostly   In the past 7 days, how often have you been bothered by emotional problems such as feeling anxious, depressed, or irritable? Sometimes Rarely  Rarely   In the past 7 days, how would you rate your fatigue on average? Mild Mild  Mild   In the past 7 days, how would you rate your pain on average, where 0 means no pain, and 10 means worst imaginable pain? 3 3  3   In general, would you say your health is: 3 3 3 3    3    3    3    3   In general, would you say your quality of life is: 3 3 4 4    4    4    4    4   In general, how would you rate your physical health? 2 2 3 2    2    2    2    2   In general, how would you rate your mental health, including your mood and your ability to think? 3 3 4 4    4    4    4    4   In general, how would you rate your satisfaction with your social activities and relationships? 3 3 5 5    5    5    5    5   In general, please rate how well you carry out your usual social activities and roles. (This includes activities at home, at work and in your community, and responsibilities as a parent, child, spouse, employee, friend, etc.) 3 3 5 4    4    4    4    4   To what extent are you able to carry out your everyday physical activities such as walking, climbing stairs, carrying groceries, or moving a chair? 5 5 4 4    4    4    4    4   In the past 7 days, how often have you been bothered by emotional problems such as feeling anxious, depressed, or irritable? 3 2 2 2    2    2    2    2   In the past 7 days, how would you rate your fatigue on average? 2 2 2 2    2    2    2    2   In the past 7 days, how would you rate your pain on average, where 0 means no pain, and 10 means worst imaginable pain? 3 3 4 3    3    3    3    3   Global Mental Health Score 12 13 17 17    17    17    17    17   Global Physical  "Health Score 15 15 14 14    14    14    14    14   PROMIS TOTAL - SUBSCORES 27 28 31 31    31    31    31    31     Mental health diagnosis none  Date of last SIB:  never  Date of  last SI:  never  Date of last HI: never  Behavioral Targets:  see tx plan  Risk factors:  Client dealing with liver disease/cancer and Crohns, client needs a new liver,   Protective factors:  spirituality, forward/future oriented thinking, safe and stable environment, regular physical activity, living with other people and structured day  Current MH Assignments:  see tx plan      Narrative: wk of 8/7 ratings on likert scale 1-low and 10 -high: Anxiety: 2 stress: 1 depression 2. She participated in discussion on \"change the channel\" when getting into an anxiety loop. \"It is a simple way to do something more productive. Virginia actively participated in discussion on Thought distortions and discussed which ones are the ones she actively wants to focus on for the time being: catastrophising and prediction.  She discussed  two situations where the techniques could be helpful, including: Should's and musts and not setting up unrealistic expectations of how she or life should be.  I want to remind myself \"until someone has walked a mile in my shoes, they don't get to  me and the same goes for me with them.  Virginia actively participated in meditation and said it was good to take that time to be.  \"I liked it\"  wk of 7/31  she participated in psychoeducation on Understanding my anxiety   This topic helped client to gain awareness of thoughts and feelings associated with anxiety and act with awareness when feeling anxiety and use helpful coping.  Client did actively participate in group discussions, and was able  to identify anxious thoughts, physical feelings when having anxiety: racing heart, shaking, confusion  \"why me\"  She was able to  identify 2 helpful coping methods: breath, talk with her  who is a comfort, stop the story " "telling/negativity when aware.  Virginia discussed that her tumor is shrinking and she is \"cautiously optimistic\". She said she wanted to share that she has many feelings associated with this, \"it even could mean I won't need a LT, but that remains to be seen\"  I told Virginia she has been shown how with our spirituality and belief, sometimes things can happen that were never anticipated.  She said \"so true\" I never had an idea that was even a part of my hope\"      Wk of 7/24- client denies thoughts of self-harm. On Likert scall 1-low and 10-high client rates anxiety 1  depression 0 and stress 2. She shared her new diagnosis of tumor growth has impacts stress and anxiety.   wk of 7/17: no thoughts of self harm or concerns\" on likert scale 1-low and 10 -high:   Anxiety:  5  stress: 5 depression 1  issues to discuss and Helpful coping:  All related to my medical, but I have great medical team and family support.  Client said of the reading:I like the ongoing discussions of shame and this reading brought me awareness that I have some to deal with regarding the nature of why many get liver cancer.       wk of 7/10:  She was able to repeat the steps of 5 senses, as a review.   \"no thoughts of self harm or concerns\".  Clients feedback to group member  on likert scale 1-low and 10 -high:   Anxiety:  2  stress: 2  depression 2  issues to discuss and Helpful coping: talking with you guys.  I had a craving that was pretty big this week.  Psychoeducation/Skills The thinking-feeling connection. This topic will address information from the [place] [employer] about how our thoughts influence our feelings.  Client was able to define automatic thoughts and make a connection with some that she has.   She said that she has been continueing to gain awareness of her beliefs vs perceptions vs what is a thought and what is a feeling.  Discussed that reminding herself it is ok to have thoughts and feelings, but they are not facts " "for other people.    Client participated in lovingkindness meditation and said it was just what she needed today and it that it was helpful and grounding.  \"Thank you for doing it\"    wk of 7/3: Clients participated in 5 senses psychoeducation discussion, participated in guided practice and was able to name 5 parts, and said she feels it will be a helpful tool  \"no thoughts of self harm or concerns\".   I discussed the research on gratitude journals and expressed she has seemed like she has an \"attitude of gratitude\".  She said she feels blessed and hopes she does.  on likert scale 1-low and 10 -high:   Anxiety:  0  stress: 0 depression 0  issues to discuss and Helpful coping: I am learning to say no, and that boundaries are good    wk of 6/26  Attended  skills group with Darienl Au.    She participated in psychoeducation on Core beliefsThis topic will discuss core beliefs, as a lens through which we see life and how that affects our choices.  Virginia said the discussion on core beliefs is something she will spend some time on, as the awareness is helpful.  I told her we will follow up on Wednesday with more that can be helpful when we notice harmful core beliefs.   \"no thoughts of self harm or concerns\"  on likert scale 1-low and 10 -high:   Anxiety:  1 stress: 1 depression 0  issues to discuss and Helpful coping: learning about self, anxiety and stress  wk of 6/19 \"no thoughts of self harm or no concerns\".  She reports on likert scale 1-low and 10 -high:   Anxiety:  1  stress: 1 depression 0  issues to discuss and Helpful coping: getting to know people better in group, hearing other's helpful coping.  Attended  skills group with Darinel Au.  We discussed anticipating some situations and \"gearing up\" by practicing deep breathing. I reminded client of previous discussions where beliefs often guide how we feel about certain situations, but that we can reframe them, or look at them for the particular " "situation.      Wk of   I am feeling much better and less anxiety about group attendance as I continue to attend and get to know others. \"no thoughts of self harm or concerns\". On likert scale 1-low and 10 -high:   Anxiety:  1  stress: 1 depression 1  issues to discuss and Helpful coping:  Breathing, getting to others in group  Wk of  \"no thoughts of self harm\"  Attended MH skills group with Darinel Malonebert  Week of 23 :Client reports feeling anxious due to first treatment experience, not knowing what to expect, her  Mother's declining health-needing a placement for higher level of care, spouse recently lost his job-need to obtain COBRA insurance and unable to care for grandchildren because of attending treatment. Client was saw Traci Aguilar and has not seen them in about 6 weeks. To follow up with primary counselor re: seeing psychotherapist or return to [facility].  Client rates the following on a scale of 1 (low) to 10 (high):  Anxiety- 5  Depression -1  Stress- 5 as a result of above concerns.   Client participated in psychotherapy/education on self-care, introduction to relaxation meditation-tensing and relaxing muscles, and sleep hygiene.  Current Mental Health symptoms include: . Active interventions to stabilize mental health symptoms this week :   At SI her PHQ-2 score was 0 and his TAINA-2 score was 1.  PROMISE 10 was  31:  She reports that at this time she is dealing with her mother's living situation and health issues. She reports that her father has passed away and that she is estranged from 1 sister.         Dimension 4: Treatment Acceptance / Resistance -   Previous Dimension Ratin  Current Dimension Ratin  RENEE Diagnosis:  Alcohol Use Disorder   303.90 (F10.20) Severe In a controlled environment  Commitment to tx process/Stage of change- contemplation  RENEE assignments - see tx plan      Narrative - wk of   motivation for sobriety 10.  Motivation is high to have " "quality of life  wk of 7/31  on likert scale 1-low and 10 -high:  motivation for sobriety: 10   WK of 7/24-motivation 10 on Likert scale.she expressed strong commitment to sobriety, see notes dated 7/25 for more details.  wk of 7/17:  on likert scale 1-low and 10 -high:  motivation for sobriety: 10+  wk of 7/10  My motivation for sobriety is 10/10\".  I think it is so important to attend and be dedicated to support in this group, and to \"say things outloud, so it takes the power away, as you say, Alana\"  wk of 7/3  on likert scale 1-low and 10 -high:  motivation for sobriety 10.  Virginia said Mondays session really stuck with her and she thought about a lot of things and is really understanding why attendance and building on the days and lessons is important.     wk of 6/26  sober support motivation:  2, but I think motivation will grow as I keep settling in and I am motivated 10/10 for sobreity.    Wk of 6/19:  Client reports motivation for sobriety is 10 (high) Motivation for attending community support 5 (on scale 1 low-10 high).   Virginia expressed to her group how helpful they are to her and how much she appreciates all that members shared about their stories.  She said she gains such helpful insight for self   Week of 6/12 Virginia discussed that she has been late a couple times, but realized with 2 other members gone, how this speaks to the importance of being on time and valuing and respecting others time, too.   She will also miss tomorrows group due to a long awaited medical appt. Dim 4: on likert scale 1-low and 10 -high:  Motivation for sobriety:  10  motivation for sober support attendance: not doing yet  Wk of 6/6  Virginia also said she finds motivation for sobriety in her family and friends, \"but really deep down need to do this for myself\"  Told counselor and group how she didn't think she would get much out of tx and already is getting so much and is grateful for older adult group   Week of 5/29/23: Client " "reports motivation for sobriety is 10 (high) Motivation for attending community support 5 (on scale 1 low-10 high).  The patient is motivated, to explore treatment.  She said \"of course I am partly here to fulfill requirements for liver transplant, but I want to learn healthy coping for lifelong sobriety.      Dimension 5: Relapse / Continued Problem Potential -   Previous Dimension Ratin  Current Dimension Ratin  Relapses this week - None  Urges to use - None  UA results -   Recent Results (from the past 168 hour(s))   Erythrocyte sedimentation rate auto    Collection Time: 23 11:46 AM   Result Value Ref Range    Erythrocyte Sedimentation Rate 54 (H) 0 - 30 mm/hr   Hepatic panel    Collection Time: 23 11:46 AM   Result Value Ref Range    Protein Total 7.4 6.4 - 8.3 g/dL    Albumin 3.6 3.5 - 5.2 g/dL    Bilirubin Total 1.6 (H) <=1.2 mg/dL    Alkaline Phosphatase 125 (H) 35 - 104 U/L    AST 66 (H) 0 - 45 U/L    ALT 33 0 - 50 U/L    Bilirubin Direct 0.53 (H) 0.00 - 0.30 mg/dL   CRP inflammation    Collection Time: 23 11:46 AM   Result Value Ref Range    CRP Inflammation 7.03 (H) <5.00 mg/L   CBC with platelets and differential    Collection Time: 23 11:46 AM   Result Value Ref Range    WBC Count 3.5 (L) 4.0 - 11.0 10e3/uL    RBC Count 3.80 3.80 - 5.20 10e6/uL    Hemoglobin 13.2 11.7 - 15.7 g/dL    Hematocrit 38.6 35.0 - 47.0 %     (H) 78 - 100 fL    MCH 34.7 (H) 26.5 - 33.0 pg    MCHC 34.2 31.5 - 36.5 g/dL    RDW 13.8 10.0 - 15.0 %    Platelet Count 105 (L) 150 - 450 10e3/uL    % Neutrophils 59 %    % Lymphocytes 22 %    % Monocytes 10 %    % Eosinophils 9 %    % Basophils 1 %    % Immature Granulocytes 0 %    Absolute Neutrophils 2.1 1.6 - 8.3 10e3/uL    Absolute Lymphocytes 0.8 0.8 - 5.3 10e3/uL    Absolute Monocytes 0.3 0.0 - 1.3 10e3/uL    Absolute Eosinophils 0.3 0.0 - 0.7 10e3/uL    Absolute Basophils 0.0 0.0 - 0.2 10e3/uL    Absolute Immature Granulocytes 0.0 <=0.4 " "10e3/uL     Using ReSet Clem: N/A    Narrative-   wk of 8/7 Virginia said that a trigger for her was [place] the casino for [place] comedy show. \"I haven't been there since starting tx\"  My  and I just kept walking and he also said \"I got you\".  She said her cravings only lasted a couple minutes.  We discussed how this can be common and some group members said they cannot go to the same places at all because of that kind of trigger.  She said \"I get it, and will continue to be aware of situations like this   wk of 7/31   cravings, warning signs, triggers: None this week   WK of 7/24-client denies triggers, cravings or warning signs this past week.  wk of 7/17:  cravings, warning signs, triggers:i went to a musical festival, had a few cravings, but also had insight when watching others use, \"glad it isn't me\"  Wk of 7/10 Had a PeTH test 7/7, see above. No issues. She discussed the Serenity Prayer and that she finds it helpful.  She also discussed having cravings at a 4 of 10 when cooking, and what she found helpful:  Processed, talked with , talk with group today about it.   Virginia said she really had quite a learning week with events in group, discussions and assignments.  \"My heart goes out to the new person the other day, but also I had some anger about all the disruptions and negativity from her\"  \"I am ready to move on, and learn what I learned, and know that could be any one of us\"  wk of 7/5  cravings, warning signs, triggers: none.  My adult kids are caring and asked if I thought I would be OK going to [facility], as there is drinking there.  She told them she appreciates the concern, but feels with them and little to no triggers, for quite some time, she didn't feel it was a problem.  Virginia said she had a wonderful time with her family and is not concerned about use over 4th of July holiday either, she has a good plan for spending time with family and focusing on good food.   wk of 6/26  Dim 5: " "cravings, warning signs, triggers: none.  Wed reading was focused blame.  It said blaming is hiding, that when we blame others we try to hide our character defects.  We discussed the meaning of character defects. \"I was in big denial for a long time, it took being told I would need a new liver for me to really realize what drinking was doing. \"it hit me like a ton of bricks when I finally really heard it\"  wk of 6/19  She participated in  Psychoeducation/Skills: Self-Care.  Client s completed self-care assessment and described self-care as feeling better physically. Client identified physical self-care they do well- attending medical appointments, one they do poorly or needs improvement- eating healthy food and barriers that prevent themselves from self-care - cost of food is expensive, the shopping and prep.  This topic will give a general overview of self-care: physical, environmental, emotional, social and spiritual. It will explore importance of self-care and the impact on recovery.   Wk of 6/12. Dim 5: warning signs, triggers and cravings: none   helpful coping: attending group. Examples of benefits of drinking or drug use, or other behaviors Physical, Social mental or emotional: confidence, escape from reality, \"friends\"     Main drawbacks connected to these: not real friends that only want to drink together.  Headaches and slurred speech.  Remoarse  Benefits connected to abstinence from addictive behaviors:more able to do me and be me, I enjoy trud friends, I remember things better and will have better memories      Drawbacks you see connected with abstinence from these:  Wk of6/6 she participated in the daily reading and said she liked it.  Virginia also gave meaningful feedback to client who discussed use episode: \"it could be anyone of us\"  She said this is important and she is learning from others sharing.  Week of 5/29/23: Client denies riggers or warning signs this past week. She said she liked the saying " "\"it is just as easy to create good habits as bad ones\"   Patient reports family members and her  are concerned about their substance use.  Patient reports their recovery goals are \"abstinence forever.\"  She has had no previous detoxes or treatments and needs to gain insight into healthy coping for relapse prevention.      Dimension 6: Recovery Environment -   Previous Dimension Ratin  Current Dimension Ratin  Family Involvement - not at this time  Summarize attendance at family groups and family sessions - NA  Family supportive of treatment?  Yes    Community support group attendance - no  Recreational activities - some camping, cooking and boating    Narrative -  \"I am at 5 on wanting to attend community support, but find group support very helpful!  wk of   sober support motivation:  I have so much going on with medical and this meeting 3x per week, that realistically, I will not likely look into community support until after my surgery at the end of the month.   WK of -client's support are her family, she is not attending support groups and motivation to attend is 2 on Likert scale. Client shared she has learned this week is this is a continuous journey and staying positive is huge. To support her sobriety this week she will continue to surround herself with good people. She is grateful for home, shelter and stability.  wk of :  sober support motivation: this group and my family.     wk of 7/10: \"I am motivated for outside support at 2/10, but when I get done with Tuesday group, I think my motivtiaon will be higher\"  wk of    We discussed \"The power of Solitude\" and how spending time alone with ourselves may not be easy or even desirable, it it is key to getting to know self.  I emphasized that studies tell us we need each other to survive and be happy, but also when we loose the ability to be alone with ourselves, sometimes our overstimulated nervous systems suffer from no " "place to rest and recharge.  She said this is good to discuss, as she may even consider doing it a bit more, as it \"really does recharge me\" She said she is pretty good about finding alone time and also uses journaling.  I asked client how this applies to recovery and treatment:She said she will often take a topic from group, or a question and journal about.  She said it really helps   Wk of 6/26. She said sober support motivation is  2, but I think motivation will grow as I keep settling in  I talked with clients about taking responsibility for our actions, so we can be empowered and own our life and also change if we need to. We discussed resources including AA, SMART Recovery.  I encouraged clients to start exploring meetings.  Wk of 6/19.  Virginia reports with having group 3x per week, it is a good deal of support, \" I will continue to learn about peer support\"  wk of 6/12client discussed resources sober support, including AA, Liver Transplant support and [place] recovery connection  wk of 6/6  goals for the wknd: look into volunteering at [facility] for writing card.  \"no community support attendance at this time\"   Client is not currently attending community sup[ort and does not have a sponsor. Client's supportive people are her family. Client shared she has friends and has not talked with many about her current situation, because \"I am a private person,\" (treatment and needing transplant).  Patient reports they are involved in community of holden activities    They reports spirituality impacts recovery in the following ways:  \"There was some changes as far as .  We do belong to a [employer] but it's been a number of years since we've been.\"  Patient believes her spirituality and sobriety are connected. Patient reported having 3 children, all adults.  Patient has 2 grandchildren.    She lives with her spouse and son. recreational/leisure activities: camping, cooking, boating, playing board games and card games.  Patient is " currently disabled due to her Crohn's Disease.  Patient reports their income is obtained through SSDI disability; spouse.  Patient does not identify finances as a current stressor.         Progress made on transition planning goals: just began tx    Justification for Continued Treatment at this Level of Care:  No previous tx, needs 6 months of sobriety to be eligible for a transplant, but is having some medical issues that she ocontinues to work on with her medical team needs to learn long term relapse prevention, she reports finding that attending group is helpful for her and she is learning.She said she did not think she would get much out of this but is getting so much support and understanding of healthy coping.  She is not attending community support, at this time.  She is working on goals and assignments.  She gets regular PEth  tests, and there have been no indications of alcohol use.    Treatment coordination activities this week:  7/31 reviewed Onelia Singh notes and discussed client with her.  7/18  Discussed client info with margaret Singh   Discussed client info with margaret Singh  Need for peer recovery support referral? No    Discharge Planning: Not at this time      Has vulnerable adult status change? No    Interdisciplinary Clinical Supervision including:  no    Are Treatment Plan goals/objectives effective? Yes  *If no, list changes to treatment plan:    Are the current goals meeting client's needs? Yes  *If no, list the changes to treatment plan.  Plan:  Continue per Master Treatment Plan    *Client agrees with any changes to the treatment plan: Yes  *Client received copy of changes: Yes  *Client is aware of right to access a treatment plan review: Yes     Staff Members Contributing To Weekly Review:      Judi Dodd, MS, LADC, LPC  Lead Counselor Adult [facility]

## 2023-08-11 LAB — SCANNED LAB RESULT: NORMAL

## 2023-08-14 ENCOUNTER — HOSPITAL ENCOUNTER (OUTPATIENT)
Dept: BEHAVIORAL HEALTH | Facility: CLINIC | Age: 58
Discharge: HOME | End: 2023-08-14
Attending: FAMILY MEDICINE
Payer: COMMERCIAL

## 2023-08-14 PROCEDURE — H2035 A/D TX PROGRAM, PER HOUR: HCPCS | Mod: HQ

## 2023-08-14 NOTE — GROUP NOTE
Group Therapy Documentation    PATIENT'S NAME: Abiola Matute  MRN:   [id number]  :   1965  ACCT. NUMBER: [id number]  DATE OF SERVICE: 23  START TIME: 12:00 PM  END TIME:  2:00 PM  FACILITATOR(S): Judi Dodd LADC  TOPIC: BEH Group Therapy  Number of patients attending the group:  4  Group Length:  2 Hours    Group Therapy Type: Addiction    Summary of Group / Topics Discussed:    Boredom, Boundaries, Forgiveness, Grief & Loss, Journaling, Relaxation Techniques, and Resentments      We checked in on logistics and new client introduced herself and anything that came up from last week.  Clients discussed examples of thought distortions they have noticed from last weeks discussion.  We did a reading on noticing our thoughts and when they are negative and how this can add to our lowered self esteem.  We talked about areas of life where they feel more esteem and areas they feel less.  Clients got handout on Self esteem and noticing thoughts that can be more helpful and and actions that can add to being more helpful many of these are simple things.  We discussed check in of all dimensions.      Attestation: Chris Juares MD - Medical Director - Provides oversight and supervision of care.         Group Attendance:  Attended group session    Patient's response to the group topic/interactions:  cooperative with task, discussed personal experience with topic, expressed readiness to alter behaviors, and listened actively    Patient appeared to be Actively participating and Engaged.          Client specific details:Today's session focussed on check in of all dimensions and  dim 3 self esteem.  Virginia discussed that she went to [place] with friends who were drinking, but that they all said they would not need to drink.  She said she appreciated that but it really didn't bother her in the least, as she likes being sober.  Group discussed this type of situation and some group members said it would have been  "hard for them.  Virginia said if it ever is, she will change what she does or ask them not to use.  \"I have very dear friends and I know they mean it when they say the don't need to drink around me.  Ways I can think differently: listen to my critical voice and change my thoughts  Ways I can do things differently:  talk to myself like I would a friend    Dim1: ASHISH: no change reported   Dim2: medical issues or appts: nothing other than the Crohns and my ongoing tumor  Dim 3: \"no thoughts of self harm or concerns\"  on likert scale 1-low and 10 -high:   Anxiety:  0  stress: 0 depression 0  issues to discuss and helpful coping:  doing fun things, attending this group, being sober  Dim 4 on likert scale 1-low and 10 -high:    motivation for sobriety; 10  Dim 5: cravings, warning signs, triggers: the [place] concert could have been but wasn't   Dim 6 sober support motivation:  5    P)  Notice negative thoughts, take action or change thought.    Attestation:   Jonathan Juares MD - Medical Director - Provides oversight and supervision of care.     Judi Dodd, MS, LADC, LPC                                                 "

## 2023-08-15 ENCOUNTER — PRE VISIT (OUTPATIENT)
Dept: SURGERY | Facility: CLINIC | Age: 58
End: 2023-08-15

## 2023-08-15 ENCOUNTER — ANESTHESIA EVENT (OUTPATIENT)
Dept: SURGERY | Facility: CLINIC | Age: 58
End: 2023-08-15
Payer: COMMERCIAL

## 2023-08-15 ENCOUNTER — VIRTUAL VISIT (OUTPATIENT)
Dept: SURGERY | Facility: CLINIC | Age: 58
End: 2023-08-15
Payer: COMMERCIAL

## 2023-08-15 DIAGNOSIS — C22.0 HCC (HEPATOCELLULAR CARCINOMA) (H): ICD-10-CM

## 2023-08-15 DIAGNOSIS — Z01.818 PREOP EXAMINATION: Primary | ICD-10-CM

## 2023-08-15 PROCEDURE — 99214 OFFICE O/P EST MOD 30 MIN: CPT | Mod: VID | Performed by: PHYSICIAN ASSISTANT

## 2023-08-15 ASSESSMENT — ENCOUNTER SYMPTOMS
ORTHOPNEA: 0
SEIZURES: 0

## 2023-08-15 ASSESSMENT — LIFESTYLE VARIABLES: TOBACCO_USE: 0

## 2023-08-15 ASSESSMENT — COPD QUESTIONNAIRES: COPD: 0

## 2023-08-15 NOTE — H&P
Pre-Operative H & P     CC:  Preoperative exam to assess for increased cardiopulmonary risk while undergoing surgery and anesthesia.    Date of Encounter: 8/15/2023  Primary Care Physician:  Linda Macdonald     Reason for visit:   Encounter Diagnoses   Name Primary?    Preop examination Yes    HCC (hepatocellular carcinoma) (H)        HPI  Abiola Matute is a 58 year old female who presents for pre-operative H & P in preparation for  Procedure Information       Case: [id number] Date/Time: 08/29/23 1410    Procedure: Laparoscopic microwave ablation of liver tumor intraoperative ultrasound of liver (Abdomen)    Anesthesia type: General    Diagnosis: HCC (hepatocellular carcinoma) (H) [C22.0]    Pre-op diagnosis: HCC (hepatocellular carcinoma) (H) [C22.0]    Location:  OR [address] [place] [address] OR    Providers: Albino Saavedra MD            Ms. Matute has a past medical history significant for stable ascending aortic dilation, Crohn's disease on Remicade every 4 weeks, EtOH cirrhosis now with HCC status post radioembolization with residual tumor.  The above procedure is now planned.    History is obtained from the patient and chart review    Hx of abnormal bleeding or anti-platelet use: Denies    Menstrual history: Patient's last menstrual period was 05/31/2006.:      Past Medical History  Past Medical History:   Diagnosis Date    Alcohol abuse     Alcoholic cirrhosis of liver with ascites     Anal fistula     Atypical ductal hyperplasia of breast 09/10/2010    ERT not recommended -left - and flat epithelial atypia-scheduled for breast biopsy 9/17/2010     Bifid uvula     Cholelithiasis     Contact perianal dermatitis and other eczema     recurrent - clobetasol     Crohn disease     Fear of flying      - gets Ativan prn.     HCC (hepatocellular carcinoma) (H) 2/1/2023    Hypertension     IBS (irritable bowel syndrome)        Past Surgical History  Past Surgical History:   Procedure Laterality Date    BIOPSY ANAL N/A 3/28/2019     anal biopsy and culure placement of seton - Dr Fleming    BIOPSY BREAST Left 09/17/2010    - scheduled with Dr. Kojo BELTRE LIVER  2019    COLONOSCOPY  2006    COLONOSCOPY N/A 12/23/2014    Procedure: COMBINED COLONOSCOPY, SINGLE OR MULTIPLE BIOPSY/POLYPECTOMY BY BIOPSY;  Surgeon: Diane Fleming MD;  Location:  GI    COLONOSCOPY N/A 12/5/2019    Procedure: COLONOSCOPY, WITH POLYPECTOMY AND BIOPSY;  Surgeon: Farhan Schilling MD;  Location: [facility] GI    ENDOSCOPIC RETROGRADE CHOLANGIOPANCREATOGRAM N/A 4/24/2020    Procedure: ENDOSCOPIC RETROGRADE CHOLANGIOPANCREATOGRAPHY WITH, sledge removal,sphincterotomy, stent in gallbladder and pancreatic duct stent, and balloon dilation;  Surgeon: Guru Isac Kraft MD;  Location: [facility] OR    ESOPHAGOSCOPY, GASTROSCOPY, DUODENOSCOPY (EGD), COMBINED N/A 12/5/2019    Procedure: ESOPHAGOGASTRODUODENOSCOPY (EGD);  Surgeon: Farhan Schilling MD;  Location: [facility] GI    ESOPHAGOSCOPY, GASTROSCOPY, DUODENOSCOPY (EGD), COMBINED N/A 5/11/2023    Procedure: Esophagoscopy, gastroscopy, duodenoscopy (EGD), combined;  Surgeon: Jeannette Cervantes MD;  Location: [facility] GI    EXAM UNDER ANESTHESIA ANUS N/A 3/28/2019    Procedure: EXAM UNDER ANESTHESIA ANUS;  Surgeon: Diane Fleming MD;  Location:  OR    EXAM UNDER ANESTHESIA ANUS N/A 2/26/2021    Procedure: EXAM UNDER ANESTHESIA OF ANUS, SETON PLACEMENT, EXCISION OF SKIN BRIDGE;  Surgeon: Avtar Nam MD;  Location: [facility] OR    EXAM UNDER ANESTHESIA ANUS N/A 1/6/2023    Procedure: EXAM UNDER ANESTHESIA, ANUS, SETON EXCHANGE, partial fistulotomy;  Surgeon: Mary Dugan MD;  Location: [facility] OR    HYSTERECTOMY, VAGINAL  2006    with Dr. Licha Zhou - with BSO for fibroids     IR GALLBLADDER DRAIN PLACEMENT  4/22/2020    IR SIRT (SELECTIVE INTERNAL RADIO THERAPY)  2/21/2023    IR VISCERAL ANGIOGRAM  2/21/2023    IR VISCERAL EMBOLIZATION  2/27/2023    OPEN REDUCTION INTERNAL FIXATION  ANKLE Left at age 27    plates and screws removed at age 36    Pelviscopy with removal of bilateral hydrosalpinges.  04/15/2010    [facility] APPENDECTOMY  at age 14       Prior to Admission Medications  Current Outpatient Medications   Medication Sig Dispense Refill    ammonium lactate (AMLACTIN) 12 % external cream Apply topically 2 times daily 385 g 3    clobetasol (TEMOVATE) 0.05 % external cream Apply to AA BID x 1-2 weeks then PRN. Do not apply to face. 60 g 3    furosemide (LASIX) 40 MG tablet TAKE 1 TABLET BY MOUTH  DAILY (Patient taking differently: Take 40 mg by mouth every morning) 90 tablet 3    inFLIXimab (REMICADE) 100 MG injection Inject into the vein every 28 (twenty-eight) days Last dose 8/7/23      Multiple Vitamins-Minerals (MULTIVITAMIN & MINERAL PO) Take 1 tablet by mouth every morning      spironolactone (ALDACTONE) 100 MG tablet Take 1 tablet (100 mg) by mouth every morning 90 tablet 3    triamcinolone (KENALOG) 0.1 % external cream Apply to AA BID x 1-2 week then PRN only 80 g 3    UNABLE TO FIND Take 1 tablet by mouth every morning MEDICATION NAME: [place]         Allergies  Allergies   Allergen Reactions    Fish Oil      Redness and itching around eye area only - went away when fish oil capsules stopped     Metronidazole      pain/itching    Ppd [Tuberculin Purified Protein Derivative]     Sulfa Antibiotics      hives    Terbinafine And Related      Lamisil = mild urticarial reaction       Social History  Social History     Socioeconomic History    Marital status:      Spouse name: Albino    Number of children: 3    Years of education: 14    Highest education level: Not on file   Occupational History    Occupation: unemployed   Tobacco Use    Smoking status: Never     Passive exposure: Never    Smokeless tobacco: Never   Vaping Use    Vaping Use: Never used   Substance and Sexual Activity    Alcohol use: Not Currently    Drug use: No    Sexual activity: Yes     Partners: Male     Birth  control/protection: Post-menopausal     Comment: harper had vasectomy   Other Topics Concern     Service No    Blood Transfusions No    Caffeine Concern No     Comment: rarely drinks caffeine    Occupational Exposure Not Asked    Hobby Hazards Not Asked    Sleep Concern Not Asked    Stress Concern Not Asked    Weight Concern Not Asked    Special Diet Not Asked    Back Care Not Asked    Exercise Yes     Comment: does a lot of walking - 4x/week    Bike Helmet Not Asked    Seat Belt Yes     Comment: always    Self-Exams Yes     Comment: SBE encouraged monthly    Parent/sibling w/ CABG, MI or angioplasty before 65F 55M? Yes   Social History Narrative    calcium - drinks 5-6 large glasses skim milk/day    flex sig/colonoscopy -at age 49    sun precautions - discussed    mammogram - needs every 2 years in her 30's, then yearly from then on    Td booster - 9/99 and 4/27/2010    pneumovax -at age 59    DEXA -when perimenopausal    stool hemoccults - every year after age 40    ASA- start at age 40    mulvitamin - encouraged     Social Determinants of Health     Financial Resource Strain: Low Risk  (9/15/2020)    Overall Financial Resource Strain (CARDIA)     Difficulty of Paying Living Expenses: Not very hard   Food Insecurity: No Food Insecurity (9/15/2020)    Hunger Vital Sign     Worried About Running Out of Food in the Last Year: Never true     Ran Out of Food in the Last Year: Never true   Transportation Needs: No Transportation Needs (9/15/2020)    PRAPARE - Transportation     Lack of Transportation (Medical): No     Lack of Transportation (Non-Medical): No   Physical Activity: Unknown (9/15/2020)    Exercise Vital Sign     Days of Exercise per Week: 0 days     Minutes of Exercise per Session: Not on file   Stress: Stress Concern Present (9/15/2020)    Citizen of [place] of Occupational Health - Occupational Stress Questionnaire     Feeling of Stress : Rather much   Social Connections: Unknown (9/15/2020)     Social Connection and Isolation Panel [NHANES]     Frequency of Communication with Friends and Family: More than three times a week     Frequency of Social Gatherings with Friends and Family: More than three times a week     Attends [employer] Services: Not on file     Active Member of Clubs or Organizations: Not on file     Attends Club or Organization Meetings: Not on file     Marital Status: Not on file   Intimate Partner Violence: Not on file   Housing Stability: Not on file       Family History  Family History   Problem Relation Age of Onset    Breast Cancer Mother     Gastrointestinal Disease Mother     Heart Disease Father 57    Hypertension Maternal Grandmother     Substance Abuse Maternal Grandfather     Diabetes Maternal Grandfather     Diabetes Paternal Grandmother     Heart Disease Paternal Grandfather     Cancer Sister     Skin Cancer Sister     Substance Abuse Maternal Uncle     Substance Abuse Maternal Uncle     Suicide Niece     Suicide Niece     Anesthesia Reaction No family hx of     Thrombosis No family hx of        Review of Systems  The complete review of systems is negative other than noted in the HPI or here.     Anesthesia Evaluation   Pt has had prior anesthetic.     No history of anesthetic complications       ROS/MED HX  ENT/Pulmonary:    (-) tobacco use, asthma, COPD and recent URI   Neurologic:  - neg neurologic ROS  (-) no seizures and no CVA   Cardiovascular: Comment: CTA 3/27/23: no CAD, ascending aorta 4.0cm unchanged    (+) -----Previous cardiac testing   Echo: Date: Results:    Stress Test: Date: Results:    ECG Reviewed: Date: 2/14/23 Results:  SR  Cath: Date: Results:   (-) CAROLINA and orthopnea/PND   METS/Exercise Tolerance: >4 METS    Hematologic: Comments: Thrombocytopenia    INR elevated   (-) history of blood clots and history of blood transfusion   Musculoskeletal: Comment: Chronic generalized joint pain      GI/Hepatic:     (+) Inflammatory bowel disease, liver disease,  (-)  GERD   Renal/Genitourinary:  - neg Renal ROS     Endo:    (-) Type I DM, Type II DM and thyroid disease   Psychiatric/Substance Use:     (+) psychiatric history anxiety (periodic situational anxiety) alcohol abuse     Infectious Disease:  - neg infectious disease ROS  (-) Recent Fever   Malignancy: Comment: S/p radioembolization 3/2023  (+) Malignancy, History of GI.GI CA Active status post.        Other: Comment: Eczema on heels     (+) , H/O Chronic Pain,        Virtual visit -  No vitals were obtained    Physical Exam  Constitutional: Awake, alert, cooperative, no apparent distress, and appears stated age.  HENT: Normocephalic  Respiratory: non labored breathing   Neurologic: Awake, alert, oriented to name, place and time.   Neuropsychiatric: Calm, cooperative. Normal affect.      Prior Labs/Diagnostic Studies   All labs and imaging personally reviewed     Component      Latest Ref Rn 8/4/2023  11:46 AM   Protein Total      6.4 - 8.3 g/dL 7.4    Albumin      3.5 - 5.2 g/dL 3.6    Bilirubin Total      <=1.2 mg/dL 1.6 (H)    Alkaline Phosphatase      35 - 104 U/L 125 (H)    AST      0 - 45 U/L 66 (H)    ALT      0 - 50 U/L 33    Bilirubin Direct      0.00 - 0.30 mg/dL 0.53 (H)       Legend:  (H) High    Component      Latest Ref Rng 8/4/2023  11:46 AM   WBC      4.0 - 11.0 10e3/uL 3.5 (L)    RBC Count      3.80 - 5.20 10e6/uL 3.80    Hemoglobin      11.7 - 15.7 g/dL 13.2    Hematocrit      35.0 - 47.0 % 38.6    MCV      78 - 100 fL 102 (H)    MCH      26.5 - 33.0 pg 34.7 (H)    MCHC      31.5 - 36.5 g/dL 34.2    RDW      10.0 - 15.0 % 13.8    Platelet Count      150 - 450 10e3/uL 105 (L)    % Neutrophils      % 59    % Lymphocytes      % 22    % Monocytes      % 10    % Eosinophils      % 9    % Basophils      % 1    % Immature Granulocytes      % 0    Absolute Neutrophils      1.6 - 8.3 10e3/uL 2.1    Absolute Lymphocytes      0.8 - 5.3 10e3/uL 0.8    Absolute Monocytes      0.0 - 1.3 10e3/uL 0.3    Absolute  Eosinophils      0.0 - 0.7 10e3/uL 0.3    Absolute Basophils      0.0 - 0.2 10e3/uL 0.0    Absolute Immature Granulocytes      <=0.4 10e3/uL 0.0       Legend:  (L) Low  (H) High    Component      Latest Ref Rng 7/7/2023  9:12 AM   Sodium      136 - 145 mmol/L 133 (L)    Potassium      3.4 - 5.3 mmol/L 4.1    Chloride      98 - 107 mmol/L 104    Carbon Dioxide (CO2)      22 - 29 mmol/L 23    Anion Gap      7 - 15 mmol/L 6 (L)    Urea Nitrogen      8.0 - 23.0 mg/dL 12.2    Creatinine      0.51 - 0.95 mg/dL 0.70    Calcium      8.6 - 10.0 mg/dL 8.8    Glucose      70 - 99 mg/dL 93    GFR Estimate      >60 mL/min/1.73m2 >90       Legend:  (L) Low      EKG/ stress test - if available please see in ROS above       The patient's records and results personally reviewed by this provider.     Outside records reviewed from: Care Everywhere      Assessment      Abiola Matute is a 58 year old female seen as a PAC referral for risk assessment and optimization for anesthesia.    Plan/Recommendations  Pt will be optimized for the proposed procedure.  See below for details on the assessment, risk, and preoperative recommendations    NEUROLOGY  - No history of TIA, CVA or seizure    -Post Op delirium risk factors:  No risk identified    ENT  - No current airway concerns.  Will need to be reassessed day of surgery.  Mallampati: Unable to assess  TM: Unable to assess    CARDIAC  - No history of CAD and Afib   -Chart indicates hypertension, she is not taking antihypertensives and her last 2 measured blood pressures are 130/80 and 120/78.  - Denies chest pain, SOB, CAROLINA, palpitations    - METS (Metabolic Equivalents)  Patient performs 4 or more METS exercise without symptoms            Total Score: 0      RCRI-Low risk: Class 2 0.9% complication rate            Total Score: 1    RCRI: High Risk Surgery        PULMONARY    ERIC Low Risk            Total Score: 1    ERIC: Over 50 ys old      - Denies asthma or inhaler use  - Tobacco  "History      History   Smoking Status    Never   Smokeless Tobacco    Never       GI  - Denies GERD  PONV High Risk  Total Score: 3           1 AN PONV: Pt is Female    1 AN PONV: Patient is not a current smoker    1 AN PONV: Intended Post Op Opioids      - Crohn's disease: infliximab [id number], last dose 8/7. Staff message to Dr. Saavedra to confirm he is ok with interval from last dose to DOS. Guidelines indicate 4-5 weeks.     Addendum: Dr. Saavedra ok with interval of last dose 8/7        /RENAL  - Baseline Creatinine 0.70    ENDOCRINE    - BMI: Estimated body mass index is 29.86 kg/m  as calculated from the following:    Height as of 7/26/23: 1.676 m (5' 6\").    Weight as of 7/26/23: 83.9 kg (185 lb).  Overweight (BMI 25.0-29.9)  - No history of Diabetes Mellitus    HEME  VTE Medium Risk 1.8%            Total Score: 5    VTE: Current cancer      - No history of abnormal bleeding or antiplatelet use.        Different anesthesia methods/types have been discussed with the patient, but they are aware that the final plan will be decided by the assigned anesthesia provider on the date of service.      The patient is optimized for their procedure. AVS with information on surgery time/arrival time, meds and NPO status given by nursing staff. No further diagnostic testing indicated.    Please refer to the physical examination documented by the anesthesiologist in the anesthesia record on the day of surgery.    Video-Visit Details    Type of service:  Video Visit    Provider received verbal consent for a Video Visit from the patient? Yes     Originating Location (pt. Location): Home    Distant Location (provider location):  Off-site  Mode of Communication:  Video Conference via [employer]  On the day of service:     Prep time: 16 minutes  Visit time: 10 minutes  Documentation time: 11 minutes  ------------------------------------------  Total time: 37 minutes      Gregoria Bear PA-C  Preoperative Assessment [facility] " of [facility]  Clinic and Surgery Center  Phone: [phone]  Fax: [phone]

## 2023-08-15 NOTE — PROGRESS NOTES
Virginia is a 58 year old who is being evaluated via a billable video visit.      How would you like to obtain your AVS? Neri      Subjective   Virginia is a 58 year old, presenting for the following health issues:  Pre-Op Exam    HPI           Review of Systems       Physical Exam     SERGEY Glass LPN

## 2023-08-15 NOTE — PROGRESS NOTES
[facility] Weekly Treatment Plan Review      Attestation:  Dr Kami De La Rosa. - Provides oversight and supervision of care.        Date Span:Monday: 23 through       Date     Group Therapy 2 hours Completed these sessions 2 hours  0 hours 0 hours       Individual Therapy                Family Therapy                 Psychoeducation                 Other (Specify)                  Clients individual sessions noted below. Client had no absences this week    Client has no absences this week    Client has one excused absence this week , due to medical appts.  Client excused from group , due to medical appointment.     Program Running Totals: (No Phase 1 IOP due to this program being only OP level of care)  Total # of Phase 2 OP Group Sessions: 24 for 48   Virginia gets 30 sessions for 60 hours at this time.  She is at 32  Counting her individual she has used 30  sessions and 53 hours  Total # of Phase 3 OP Recovery Management Group Sessions: None    Total # of 1:1 Sessions:  1 hour BALWINDER   1 hour 5/30  7/3,                                     Darinel , , ( & -Onelia)                    Weekly Treatment Plan Review     Treatment Plan initiated on: .    Dimension1: Acute Intoxication/Withdrawal Potential -   Previous Dimension Ratin  Current Dimension Ratin  Date of Last Use 22  Any reports of withdrawal symptoms - No      Dimension 2: Biomedical Conditions & Complications -   Previous Dimension Ratin  Current Dimension Ratin    Medical Concerns:wk of  No concerns I did  my pre op eval for my upcoming surgery with Dr. Bear and nurse Sakina Glass  wk of  No new concerns  wk of :  no, just good news that they may be able to remove all my tumore and I may not need a liver.  WK of -Client has an appointment with surgeon regarding increase in tumor. She is excused from  "programming due time conflict.  wk of 7/17 She had appt with radiology on 7/14, continues to work with her team due to LT, cancer and rash.  Virginia told group she is dealing with some news, medically, but working with her Drs.  She said she isn't wanting to share a whole lot, until she gethers her information, but she did want group to know she has support and appreciates just being able to share what she did  wk 7/10Jasmyn reports rash and working with , also had CT and MRI related to ongoing care for cancer and need for liver.  wk of 7/3  I get an infusion 1x per month and usually by week 3, I feel more sore and more tired, but I am grateful for the infusions.  \"no concerns beyond ongoing liver cancer and Crohns\"  wk of 6/12 She saw Jazmin Barrios at Urgent care for skin issues.  She also said she met with her GI practioner  She reports having liver cancer and Crohn's disease.  \"right now I am doing pretty well  Outside medical appointments this week (list provider and reason for visit)   wk of 8/14 No concerns I have my pre op eval with Dr. Glass, for my upcoming surgery   Wk of 8/7 No appts this week   wk of 7/31: no appts.  wk of 7/24 she saw surgeon Junior Bonilla, she is dealing with her liver tumor  wk of 7/17 She had appt with radiology, Dr. Shah,on 7/14, continues to work with her team due to LT, cancer and rash  : wk of 7/6 she saw Susanna RAZA for rash on leg.  Had a PeTH test 7/7, see below. No issues  : 6/13 she saw Jazmin Meng PA-C for a flare up of her psoriasis likely related to her other medical issues, per client     Current Medications & Medication Changes:  Current Outpatient Medications   Medication    ammonium lactate (AMLACTIN) 12 % external cream    clobetasol (TEMOVATE) 0.05 % external cream    furosemide (LASIX) 40 MG tablet    inFLIXimab (REMICADE) 100 MG injection    Multiple Vitamins-Minerals (MULTIVITAMIN & MINERAL PO)    spironolactone (ALDACTONE) 100 MG tablet    " triamcinolone (KENALOG) 0.1 % external cream    UNABLE TO FIND     No current facility-administered medications for this encounter.     Facility-Administered Medications Ordered in Other Encounters   Medication    BREEZA Lemon-Lime flavored oral liquid for Neutral Abdominal/Pelvic Imaging 1,000 mL    hyoscyamine (LEVSIN/SL) sublingual tablet 125 mcg     Medication Prescriber:  Linda Macdonald  Taking meds as prescribed? Yes  Medication side effects or concerns:  no        Dimension 3: Emotional/Behavioral Conditions & Complications -   Previous Dimension Ratin  Current Dimension Ratin  PHQ2:       2023     8:00 AM 2023     1:34 PM 2023    10:00 AM 3/28/2023    11:21 AM 3/26/2023     1:45 PM 2023     9:35 AM 1/3/2023     2:46 PM   PHQ-2 (  [employer])   Q1: Little interest or pleasure in doing things 0    0 0 0 0 0 0 0   Q2: Feeling down, depressed or hopeless 0    0 0 0 0 0 1 0   PHQ-2 Score 0    0 0 0 0 0 1 0   Q1: Little interest or pleasure in doing things Not at all   Not at all  Not at all Not at all   Q2: Feeling down, depressed or hopeless Not at all   Not at all  Several days Not at all   PHQ-2 Score 0   0  1 0      GAD2:       2023     9:54 AM 2023    10:00 AM 2023     8:00 AM   TAINA-2   Feeling nervous, anxious, or on edge 1 1 0    0    0    0    0   Not being able to stop or control worrying 1 0 0    0    0    0    0   TAINA-2 Total Score 2 1 0    0    0    0    0     PROMIS 10-Global Health (all questions and answers displayed):       2023     9:58 AM 2023     7:32 PM 2023    10:00 AM 2023     8:02 AM   PROMIS 10   In general, would you say your health is: Good Good  Good   In general, would you say your quality of life is: Good Good  Very good   In general, how would you rate your physical health? Fair Fair  Fair   In general, how would you rate your mental health, including your mood and your ability to think? Good Good  Very good   In  general, how would you rate your satisfaction with your social activities and relationships? Good Good  Excellent   In general, please rate how well you carry out your usual social activities and roles Good Good  Very good   To what extent are you able to carry out your everyday physical activities such as walking, climbing stairs, carrying groceries, or moving a chair? Completely Completely  Mostly   In the past 7 days, how often have you been bothered by emotional problems such as feeling anxious, depressed, or irritable? Sometimes Rarely  Rarely   In the past 7 days, how would you rate your fatigue on average? Mild Mild  Mild   In the past 7 days, how would you rate your pain on average, where 0 means no pain, and 10 means worst imaginable pain? 3 3  3   In general, would you say your health is: 3 3 3 3    3    3    3    3   In general, would you say your quality of life is: 3 3 4 4    4    4    4    4   In general, how would you rate your physical health? 2 2 3 2    2    2    2    2   In general, how would you rate your mental health, including your mood and your ability to think? 3 3 4 4    4    4    4    4   In general, how would you rate your satisfaction with your social activities and relationships? 3 3 5 5    5    5    5    5   In general, please rate how well you carry out your usual social activities and roles. (This includes activities at home, at work and in your community, and responsibilities as a parent, child, spouse, employee, friend, etc.) 3 3 5 4    4    4    4    4   To what extent are you able to carry out your everyday physical activities such as walking, climbing stairs, carrying groceries, or moving a chair? 5 5 4 4    4    4    4    4   In the past 7 days, how often have you been bothered by emotional problems such as feeling anxious, depressed, or irritable? 3 2 2 2    2    2    2    2   In the past 7 days, how would you rate your fatigue on average? 2 2 2 2    2    2    2    2   In the  "past 7 days, how would you rate your pain on average, where 0 means no pain, and 10 means worst imaginable pain? 3 3 4 3    3    3    3    3   Global Mental Health Score 12 13 17 17    17    17    17    17   Global Physical Health Score 15 15 14 14    14    14    14    14   PROMIS TOTAL - SUBSCORES 27 28 31 31    31    31    31    31     Mental health diagnosis none  Date of last SIB:  never  Date of  last SI:  never  Date of last HI: never  Behavioral Targets:  see tx plan  Risk factors:  Client dealing with liver disease/cancer and Crohns, client needs a new liver,   Protective factors:  spirituality, forward/future oriented thinking, safe and stable environment, regular physical activity, living with other people and structured day  Current MH Assignments:  see tx plan      Narrative: wk of 8/14  Ways I can think differently: listen to my critical voice and change my thoughts  Ways I can do things differently:  talk to myself like I would a friend.  \"no thoughts of self harm or concerns\"  on likert scale 1-low and 10 -high:   Anxiety:  0  stress: 0 depression 0  issues to discuss and helpful coping:  doing fun things, attending this group, being sober  wk of 8/7 ratings on likert scale 1-low and 10 -high: Anxiety: 2 stress: 1 depression 2. She participated in discussion on \"change the channel\" when getting into an anxiety loop. \"It is a simple way to do something more productive. Virginia actively participated in discussion on Thought distortions and discussed which ones are the ones she actively wants to focus on for the time being: catastrophising and prediction.  She discussed  two situations where the techniques could be helpful, including: Should's and musts and not setting up unrealistic expectations of how she or life should be.  I want to remind myself \"until someone has walked a mile in my shoes, they don't get to  me and the same goes for me with them.  Virginia actively participated in meditation and said " "it was good to take that time to be.  \"I liked it\"  wk of 7/31  she participated in psychoeducation on Understanding my anxiety   This topic helped client to gain awareness of thoughts and feelings associated with anxiety and act with awareness when feeling anxiety and use helpful coping.  Client did actively participate in group discussions, and was able  to identify anxious thoughts, physical feelings when having anxiety: racing heart, shaking, confusion  \"why me\"  She was able to  identify 2 helpful coping methods: breath, talk with her  who is a comfort, stop the story telling/negativity when aware.  Virginia discussed that her tumor is shrinking and she is \"cautiously optimistic\". She said she wanted to share that she has many feelings associated with this, \"it even could mean I won't need a LT, but that remains to be seen\"  I told Virginia she has been shown how with our spirituality and belief, sometimes things can happen that were never anticipated.  She said \"so true\" I never had an idea that was even a part of my hope\"      Wk of 7/24- client denies thoughts of self-harm. On Likert scall 1-low and 10-high client rates anxiety 1  depression 0 and stress 2. She shared her new diagnosis of tumor growth has impacts stress and anxiety.   wk of 7/17: no thoughts of self harm or concerns\" on likert scale 1-low and 10 -high:   Anxiety:  5  stress: 5 depression 1  issues to discuss and Helpful coping:  All related to my medical, but I have great medical team and family support.  Client said of the reading:I like the ongoing discussions of shame and this reading brought me awareness that I have some to deal with regarding the nature of why many get liver cancer.       wk of 7/10:  She was able to repeat the steps of 5 senses, as a review.   \"no thoughts of self harm or concerns\".  Clients feedback to group member  on likert scale 1-low and 10 -high:   Anxiety:  2  stress: 2  depression 2  issues to discuss and " "Helpful coping: talking with you guys.  I had a craving that was pretty big this week.  Psychoeducation/Skills The thinking-feeling connection. This topic will address information from the [employer] about how our thoughts influence our feelings.  Client was able to define automatic thoughts and make a connection with some that she has.   She said that she has been continueing to gain awareness of her beliefs vs perceptions vs what is a thought and what is a feeling.  Discussed that reminding herself it is ok to have thoughts and feelings, but they are not facts for other people.    Client participated in lovingkindness meditation and said it was just what she needed today and it that it was helpful and grounding.  \"Thank you for doing it\"    wk of 7/3: Clients participated in 5 senses psychoeducation discussion, participated in guided practice and was able to name 5 parts, and said she feels it will be a helpful tool  \"no thoughts of self harm or concerns\".   I discussed the research on gratitude journals and expressed she has seemed like she has an \"attitude of gratitude\".  She said she feels blessed and hopes she does.  on likert scale 1-low and 10 -high:   Anxiety:  0  stress: 0 depression 0  issues to discuss and Helpful coping: I am learning to say no, and that boundaries are good    wk of 6/26  Attended MH skills group with Darinel Au.    She participated in psychoeducation on Core beliefsThis topic will discuss core beliefs, as a lens through which we see life and how that affects our choices.  Virginia said the discussion on core beliefs is something she will spend some time on, as the awareness is helpful.  I told her we will follow up on Wednesday with more that can be helpful when we notice harmful core beliefs.   \"no thoughts of self harm or concerns\"  on likert scale 1-low and 10 -high:   Anxiety:  1 stress: 1 depression 0  issues to discuss and Helpful coping: learning about " "self, anxiety and stress  wk of 6/19 \"no thoughts of self harm or no concerns\".  She reports on likert scale 1-low and 10 -high:   Anxiety:  1  stress: 1 depression 0  issues to discuss and Helpful coping: getting to know people better in group, hearing other's helpful coping.  Attended MH skills group with Darinelarun Malonebert.  We discussed anticipating some situations and \"gearing up\" by practicing deep breathing. I reminded client of previous discussions where beliefs often guide how we feel about certain situations, but that we can reframe them, or look at them for the particular situation.      Wk of 6/12  I am feeling much better and less anxiety about group attendance as I continue to attend and get to know others. \"no thoughts of self harm or concerns\". On likert scale 1-low and 10 -high:   Anxiety:  1  stress: 1 depression 1  issues to discuss and Helpful coping:  Breathing, getting to others in group  Wk of 6/6 \"no thoughts of self harm\"  Attended MH skills group with Darinel Angely  Week of 5/31/23 :Client reports feeling anxious due to first treatment experience, not knowing what to expect, her  Mother's declining health-needing a placement for higher level of care, spouse recently lost his job-need to obtain COBRA insurance and unable to care for grandchildren because of attending treatment. Client was saw Traci Aguilar and has not seen them in about 6 weeks. To follow up with primary counselor re: seeing psychotherapist or return to [facility].  Client rates the following on a scale of 1 (low) to 10 (high):  Anxiety- 5  Depression -1  Stress- 5 as a result of above concerns.   Client participated in psychotherapy/education on self-care, introduction to relaxation meditation-tensing and relaxing muscles, and sleep hygiene.  Current Mental Health symptoms include: . Active interventions to stabilize mental health symptoms this week :   At SI her PHQ-2 score was 0 and his TAINA-2 score was 1.  " "PROMISE 10 was  31:  She reports that at this time she is dealing with her mother's living situation and health issues. She reports that her father has passed away and that she is estranged from 1 sister.         Dimension 4: Treatment Acceptance / Resistance -   Previous Dimension Ratin  Current Dimension Ratin  RENEE Diagnosis:  Alcohol Use Disorder   303.90 (F10.20) Severe In a controlled environment  Commitment to tx process/Stage of change- contemplation  RENEE assignments - see tx plan      Narrative - wk of   Virginia told group \"I know I am an alcoholic\" she said she is so glad to have had to find her way to the group, even if it was through health issues or she would still be doing damage.  motivation for sobriety; 10 she likes being sober and reports she likes learning and attending group.   wk of   motivation for sobriety 10.  Motivation is high to have quality of life  wk of   on likert scale 1-low and 10 -high:  motivation for sobriety: 10   WK of -motivation 10 on Likert scale.she expressed strong commitment to sobriety, see notes dated  for more details.  wk of :  on likert scale 1-low and 10 -high:  motivation for sobriety: 10+  wk of 7/10  My motivation for sobriety is 10/10\".  I think it is so important to attend and be dedicated to support in this group, and to \"say things outloud, so it takes the power away, as you say, Alana\"  wk of 7/3  on likert scale 1-low and 10 -high:  motivation for sobriety 10.  Virginia said  session really stuck with her and she thought about a lot of things and is really understanding why attendance and building on the days and lessons is important.     wk of   sober support motivation:  2, but I think motivation will grow as I keep settling in and I am motivated 10/10 for sobreity.    Wk of :  Client reports motivation for sobriety is 10 (high) Motivation for attending community support 5 (on scale 1 low-10 high).   Virginia expressed " "to her group how helpful they are to her and how much she appreciates all that members shared about their stories.  She said she gains such helpful insight for self   Week of  Virginia discussed that she has been late a couple times, but realized with 2 other members gone, how this speaks to the importance of being on time and valuing and respecting others time, too.   She will also miss tomorrows group due to a long awaited medical appt. Dim 4: on likert scale 1-low and 10 -high:  Motivation for sobriety:  10  motivation for sober support attendance: not doing yet  Wk of   Virginia also said she finds motivation for sobriety in her family and friends, \"but really deep down need to do this for myself\"  Told counselor and group how she didn't think she would get much out of tx and already is getting so much and is grateful for older adult group   Week of 23: Client reports motivation for sobriety is 10 (high) Motivation for attending community support 5 (on scale 1 low-10 high).  The patient is motivated, to explore treatment.  She said \"of course I am partly here to fulfill requirements for liver transplant, but I want to learn healthy coping for lifelong sobriety.      Dimension 5: Relapse / Continued Problem Potential -   Previous Dimension Ratin  Current Dimension Ratin  Relapses this week - None  Urges to use - None  UA results -   No results found for this or any previous visit (from the past 168 hour(s)).    Using ReSet Clem: N/A    Narrative-   Wk of    holley discussed a big warning sign for self is thinking I can drink again, especially socially.  II think about  it regularly.  Group discussed that awareness of this is good and it is likely normal to do this.  I encouraged her to continue to discuss this and down the road to keep in mind with sponsor, therapist, etc. And work through it.  I told her it is really good she can say this outloud.   Something I am doing well: having plans of " "structure, especially when around situations I used to drink in     any cravings, warning signs, triggers: the [place] concert could have been but wasn't .  I have supportive people and that helps, but so does the realization of my use patterns and how I feel better not using  wk of 8/7 Virginia said that a trigger for her was [place] the casino for [place] comedy show. \"I haven't been there since starting tx\"  My  and I just kept walking and he also said \"I got you\".  She said her cravings only lasted a couple minutes.  We discussed how this can be common and some group members said they cannot go to the same places at all because of that kind of trigger.  She said \"I get it, and will continue to be aware of situations like this   wk of 7/31   cravings, warning signs, triggers: None this week   WK of 7/24-client denies triggers, cravings or warning signs this past week.  wk of 7/17:  cravings, warning signs, triggers:i went to a musical festival, had a few cravings, but also had insight when watching others use, \"glad it isn't me\"  Wk of 7/10 Had a PeTH test 7/7, see above. No issues. She discussed the Serenity Prayer and that she finds it helpful.  She also discussed having cravings at a 4 of 10 when cooking, and what she found helpful:  Processed, talked with , talk with group today about it.   Virginia said she really had quite a learning week with events in group, discussions and assignments.  \"My heart goes out to the new person the other day, but also I had some anger about all the disruptions and negativity from her\"  \"I am ready to move on, and learn what I learned, and know that could be any one of us\"  wk of 7/5  cravings, warning signs, triggers: none.  My adult kids are caring and asked if I thought I would be OK going to [employer], as there is drinking there.  She told them she appreciates the concern, but feels with them and little to no triggers, for quite some time, she didn't feel it was a " "problem.  Virginia said she had a wonderful time with her family and is not concerned about use over 4th of July holiday either, she has a good plan for spending time with family and focusing on good food.   wk of 6/26  Dim 5: cravings, warning signs, triggers: none.  Wed reading was focused blame.  It said blaming is hiding, that when we blame others we try to hide our character defects.  We discussed the meaning of character defects. \"I was in big denial for a long time, it took being told I would need a new liver for me to really realize what drinking was doing. \"it hit me like a ton of bricks when I finally really heard it\"  wk of 6/19  She participated in  Psychoeducation/Skills: Self-Care.  Client s completed self-care assessment and described self-care as feeling better physically. Client identified physical self-care they do well- attending medical appointments, one they do poorly or needs improvement- eating healthy food and barriers that prevent themselves from self-care - cost of food is expensive, the shopping and prep.  This topic will give a general overview of self-care: physical, environmental, emotional, social and spiritual. It will explore importance of self-care and the impact on recovery.   Wk of 6/12. Dim 5: warning signs, triggers and cravings: none   helpful coping: attending group. Examples of benefits of drinking or drug use, or other behaviors Physical, Social mental or emotional: confidence, escape from reality, \"friends\"     Main drawbacks connected to these: not real friends that only want to drink together.  Headaches and slurred speech.  Remoarse  Benefits connected to abstinence from addictive behaviors:more able to do me and be me, I enjoy trud friends, I remember things better and will have better memories      Drawbacks you see connected with abstinence from these:  Wk of6/6 she participated in the daily reading and said she liked it.  Virginia also gave meaningful feedback to client who " "discussed use episode: \"it could be anyone of us\"  She said this is important and she is learning from others sharing.  Week of 23: Client denies riggers or warning signs this past week. She said she liked the saying \"it is just as easy to create good habits as bad ones\"   Patient reports family members and her  are concerned about their substance use.  Patient reports their recovery goals are \"abstinence forever.\"  She has had no previous detoxes or treatments and needs to gain insight into healthy coping for relapse prevention.      Dimension 6: Recovery Environment -   Previous Dimension Ratin  Current Dimension Ratin  Family Involvement - not at this time  Summarize attendance at family groups and family sessions - NA  Family supportive of treatment?  Yes    Community support group attendance - no  Recreational activities - some camping, cooking and boating    Narrative - wk of  [place]ia discussed that she went to [place] with friends who were drinking, but that they all said they would not need to drink. She said she appreciated that but it really didn't bother her in the least, as she likes being sober. Group discussed this type of situation and some group members said it would have been hard for them. [place] said if it ever is, she will change what she does or ask them not to use. \"I have very dear friends and I know they mean it when they say the don't need to drink around me.  sober support motivation: 5    \"I am at 5 on wanting to attend community support, but find group support very helpful!  wk of   sober support motivation:  I have so much going on with medical and this meeting 3x per week, that realistically, I will not likely look into community support until after my surgery at the end of the month.   WK of -client's support are her family, she is not attending support groups and motivation to attend is 2 on Likert scale. Client shared she has learned this week is this is " "a continuous journey and staying positive is huge. To support her sobriety this week she will continue to surround herself with good people. She is grateful for home, shelter and stability.  wk of 7/17:  sober support motivation: this group and my family.     wk of 7/10: \"I am motivated for outside support at 2/10, but when I get done with Tuesday group, I think my motivtiaon will be higher\"  wk of 7/5   We discussed \"The power of Solitude\" and how spending time alone with ourselves may not be easy or even desirable, it it is key to getting to know self.  I emphasized that studies tell us we need each other to survive and be happy, but also when we loose the ability to be alone with ourselves, sometimes our overstimulated nervous systems suffer from no place to rest and recharge.  She said this is good to discuss, as she may even consider doing it a bit more, as it \"really does recharge me\" She said she is pretty good about finding alone time and also uses journaling.  I asked client how this applies to recovery and treatment:She said she will often take a topic from group, or a question and journal about.  She said it really helps   Wk of 6/26. She said sober support motivation is  2, but I think motivation will grow as I keep settling in  I talked with clients about taking responsibility for our actions, so we can be empowered and own our life and also change if we need to. We discussed resources including AA, SMART Recovery.  I encouraged clients to start exploring meetings.  Wk of 6/19.  [place]ia reports with having group 3x per week, it is a good deal of support, \" I will continue to learn about peer support\"  wk of 6/12client discussed resources sober support, including AA, Liver Transplant support and [place] recovery connection  wk of 6/6  goals for the wknd: look into volunteering at [facility] for writing card.  \"no community support attendance at this time\"   Client is not currently attending community sup[ort and does " "not have a sponsor. Client's supportive people are her family. Client shared she has friends and has not talked with many about her current situation, because \"I am a private person,\" (treatment and needing transplant).  Patient reports they are involved in community of holden activities    They reports spirituality impacts recovery in the following ways:  \"There was some changes as far as .  We do belong to a [employer] but it's been a number of years since we've been.\"  Patient believes her spirituality and sobriety are connected. Patient reported having 3 children, all adults.  Patient has 2 grandchildren.    She lives with her spouse and son. recreational/leisure activities: camping, cooking, boating, playing board games and card games.  Patient is currently disabled due to her Crohn's Disease.  Patient reports their income is obtained through SSDI disability; spouse.  Patient does not identify finances as a current stressor.         Progress made on transition planning goals: just began tx    Justification for Continued Treatment at this Level of Care:  No previous tx, needs 6 months of sobriety to be eligible for a transplant, but is having some medical issues that she ocontinues to work on with her medical team needs to learn long term relapse prevention, she reports finding that attending group is helpful for her and she is learning.She said she did not think she would get much out of this but is getting so much support and understanding of healthy coping.  She is not attending community support, at this time.  She is working on goals and assignments.  She gets regular PEth  tests, and there have been no indications of alcohol use.    Treatment coordination activities this week:  7/31 reviewed Onelia Singh notes and discussed client with her.  7/18  Discussed client info with sub Onelia Singh   Discussed client info with sub Onelia Singh  Need for peer recovery support referral? No    Discharge Planning: Not " at this time      Has vulnerable adult status change? No    Interdisciplinary Clinical Supervision including:  no    Are Treatment Plan goals/objectives effective? Yes  *If no, list changes to treatment plan:    Are the current goals meeting client's needs? Yes  *If no, list the changes to treatment plan.  Plan:  Continue per Master Treatment Plan    *Client agrees with any changes to the treatment plan: Yes  *Client received copy of changes: Yes  *Client is aware of right to access a treatment plan review: Yes     Staff Members Contributing To Weekly Review:      Judi Dodd MS, LADC, LPC  Lead Counselor Adult [facility]

## 2023-08-15 NOTE — PATIENT INSTRUCTIONS
Preparing for Your Surgery      Name:  Abiola Matute   MRN:  [id number]   :  1965   Today's Date:  8/15/2023       Arriving for surgery:  Surgery date:  23  Arrival time:  12.10PM    Please come to:     Please come to:      [facility] [address]  [address] [place]  [address]      The [facility] Opal Patient /Visitor Ramp is located at [address]. Patients and visitors who self-park will receive the reduced hospital parking rate. If the Patient /Visitor Ramp is full, please follow the signs to the  parking located at the main hospital entrance.     parking is available ( 24 hours/ 7 days a week)    Discounted parking pass options are available for patients and visitors. They can be purchased at the [email or web address] desk at the main hospital entrance.    -    Stop at the security desk and they will direct surgery patients to the 3rd floor Surgery Waiting Room. [phone]     -  If you are in need of directions, wheelchair or escort please stop at the Information/security desk in the lobby.       What can I eat or drink?  -  You may eat and drink normally up to 8 hours prior to arrival time. (Until 4.10AM)  -  You may have clear liquids until 2 hours prior to arrival time. (Until 10.10AM)    Examples of clear liquids:  Water  Clear broth  Juices (apple, white grape, white cranberry  and cider) without pulp  Noncarbonated, powder based beverages  (lemonade and Rey-Aid)  Sodas (Sprite, 7-Up, ginger ale and seltzer)  Coffee or tea (without milk or cream)  Gatorade    -  No Alcohol or cannabis products for at least 24 hours before surgery.     Which medicines can I take?    Hold Aspirin for 7 days before surgery.   Hold Multivitamins for 7 days before surgery.  Hold Supplements for 7 days before surgery. (Turkey Tail)  Hold Ibuprofen (Advil, Motrin) for 1 day(s) before surgery--unless otherwise directed by  surgeon.  Hold Naproxen (Aleve) for 4 days before surgery.    -  DO NOT take these medications the day of surgery:  All external creams, ointments and gels, Furosemide (Lasix), Spironolactone (Aldactone).    -  PLEASE TAKE these medications the day of surgery:  None.    How do I prepare myself?  - Please take 2 showers (one the night prior to surgery and one the morning of surgery) using Scrubcare or Hibiclens soap.    Use this soap only from the neck to your toes.     Leave the soap on your skin for one minute--then rinse thoroughly.      You may use your own shampoo and conditioner. No other hair products.   - Please remove all jewelry and body piercings.  - No lotions, deodorants or fragrance.  - No makeup or fingernail polish.   - Bring your ID and insurance card.    -If you have a Deep Brain Stimulator, Spinal Cord Stimulator, or any Neuro Stimulator device---you must bring the remote control to the hospital.      ALL PATIENTS GOING HOME THE SAME DAY OF SURGERY ARE REQUIRED TO HAVE A RESPONSIBLE ADULT TO DRIVE AND BE IN ATTENDANCE WITH THEM FOR 24 HOURS FOLLOWING SURGERY.    Covid testing policy as of 12/06/2022  Your surgeon will notify and schedule you for a COVID test if one is needed before surgery--please direct any questions or COVID symptoms to your surgeon      Questions or Concerns:    - For any questions regarding the day of surgery or your hospital stay, please contact the Pre Admission Nursing Office at [phone].       - If you have health changes between today and your surgery, please call your surgeon.       - For questions after surgery, please call your surgeons office.           Current Visitor Guidelines    You may have 2 visitors in the pre op area.    Visiting hours: 8 a.m. to 8:30 p.m.    You may have four visitors during your inpatient hospital stay.    Patients confirmed or suspected to have symptoms of COVID 19 or flu:     No visitors allowed for adult patients.   Children (under age  18) can have 1 named visitor.     People who are sick or showing symptoms of COVID 19 or flu:    Are not allowed to visit patients--we can only make exceptions in special situations.       Please follow these guidelines for your visit:          Please maintain social distance          Masking is optional--however at times you may be asked to wear a mask for the safety of yourself and others     Clean your hands with alcohol hand . Do this when you arrive at and leave the building and patient room,    And again after you touch your mask or anything in the room.     Go directly to and from the room you are visiting.     Stay in the patient s room during your visit. Limit going to other places in the hospital as much as possible     Leave bags and jackets at home or in the car.     For everyone s health, please don t come and go during your visit. That includes for smoking   during your visit.

## 2023-08-16 ENCOUNTER — HOSPITAL ENCOUNTER (OUTPATIENT)
Dept: BEHAVIORAL HEALTH | Facility: CLINIC | Age: 58
Discharge: HOME | End: 2023-08-16
Attending: FAMILY MEDICINE
Payer: COMMERCIAL

## 2023-08-16 PROCEDURE — H2035 A/D TX PROGRAM, PER HOUR: HCPCS | Mod: HQ

## 2023-08-16 NOTE — GROUP NOTE
Psychoeducation Group Documentation    PATIENT'S NAME: Abiola Matute  MRN:   [id number]  :   1965  ACCT. NUMBER: [id number]  DATE OF SERVICE: 23  START TIME: 12:00 PM  END TIME:  2:00 PM  FACILITATOR(S): Judi Dodd LADC  TOPIC: BEH Pyschoeducation  Number of patients attending the group:  5  Group Length:  2 Hours    Skills Group Therapy Type: Relapse prevention skills and warning signs and triggers    Summary of Group / Topics Discussed:    Relapse prevention skills, warning signs and trigers, meditation        Attestation:   Jonathan Juares MD - Medical Director - Provides oversight and supervision of care.     Today clients did daily reading and did a few meditative breaths to get grounded and ready for group.   Because there was a new group member, each person told her briefly what brought them to tx, what they are working on and doing well, as well anything else they thought would be helpful.  Client also introduced herself and told a bit of what brought her to tx and what she hopes to get.   One client had his 1 year sobriety date and group congratulated and shared a few words with him. Group members also went over check off of warning signs and triggers and did psychoeducation on goals they have for the week.   They were given a handout on resources to begin looking at, the websites and phone numbers included on SMART, AA and other.  This will be talked about more in the next few weeks.  Clients participated in meditation to close the group and expressed if/how it was helpful.      Summary of Group / Topics Discussed:     Topic: Goal setting for self and treatement psychoeducation  This topic will discuss importance of goal setting for continued treatment and recovery     Objective(s):      -Client will gain insight into what they want for self/treatment the next week   -Client will identify areas wants to work on this weekend     Structure:      -Facilitate group discussion around each  "patient s answers to questions  -Facilitate group discussion on what goals may be helpful      Expected therapeutic outcomes:    -Understanding how short term goals can be helpful for treatment  -gain insight into what is going well and what may be helpful to decrease riskt to substances/behavior      Therapeutic outcome(s) measured by:   -Patient s ability to identify 2 goals they have      Group Attendance:  Attended group session    Patient's response to the group topic/interactions:  cooperative with task, expressed understanding of topic, and listened actively    Patient appeared to be Attentive and Engaged.        Clients specifice information :  [place]ia told group \"I know I am an alcoholic\" she said she is so glad to have had to find her way to the group, even if it was through health issues or she would still be doing damage.  Today's session focused on sim 4 motivation for recovery, diim 5 warning signs and triggers and dim 6 goals for treatment.    2 goals I have and how I am working on them: building support and attending group regularly I look forward to what I learn and the support  Client discussed a big warning sign for self is thinking I can drink again, especially socially.  II think about  it regularly.  Group discussed that awareness of this is good and it is likely normal to do this.  I encouraged her to continue to discuss this and down the road to keep in mind with sponsor, therapist, etc. And work through it.  I told her it is really good she can say this outloud.   Something I am doing well: having plans of structure, especially when around situations I used to drink in    P)   Explore resources on handout and discuss next week.       Attestation:   Jonathan Juares MD - Medical Director - Provides oversight and supervision of care.       "

## 2023-08-21 ENCOUNTER — HOSPITAL ENCOUNTER (OUTPATIENT)
Dept: BEHAVIORAL HEALTH | Facility: CLINIC | Age: 58
Discharge: HOME | End: 2023-08-21
Attending: FAMILY MEDICINE
Payer: COMMERCIAL

## 2023-08-21 PROCEDURE — H2035 A/D TX PROGRAM, PER HOUR: HCPCS | Mod: HQ

## 2023-08-21 NOTE — GROUP NOTE
Group Therapy Documentation    PATIENT'S NAME: Abiola Matute  MRN:   [id number]  :   1965  ACCT. NUMBER: [id number]  DATE OF SERVICE: 23  START TIME: 12:00 PM  END TIME:  2:00 PM  FACILITATOR(S): Isaac Rowland LADC  TOPIC: BEH Group Therapy  Number of patients attending the group:  5  Group Length:  2 Hours      Attestation:   Dr. De La Rosa - Medical Director - Provides oversight and supervision of care.     Group Therapy Type: Psychoeducation    Summary of Group / Topics Discussed:    Topic:    Addressing Shame and Stigma, Regaining Integrity, Understanding the History of The Community Based Sober Support and the Importance of being part of a Recovery Community for ongoing recovery from addiction.     This topic will give a general overview of the importance of engaging in Community Based Sober Support meetings, relationships and social activities.  The importance of  shame and guilt, recognizing past behavior as behavior not one's identity and understanding the role of stigma in keeping one seemingly trapped in addiction and not seeking help is also addressed in this session. The history of the community-based recovery movement is presented in this interactive session as is the ongoing need for long term ongoing community based sober support.     Objective(s):  Client will be introduced to the term stigma and share how stigma about addiction has played a detrimental role in getting the care they needed to get well.   Client will identify the importance of identifying personal values, acknowledging how they violated these values while using and how to live a life where their personal values are honored.    Client will explain the importance of making connections and being in community with others in recovery that share a similar commitment to sobriety.   Client will discuss how personal boundaries in recovery play a crucial role in maintaining abstinence in recovery and in navigating  life in a culture that is not always hospitable to those abstaining from mood altering substances.      Structure:  Provide psychoeducation on the benefits of connecting with community based sober support network of people, meetings and activities on a regular basis to build connections that sustain a balanced sober and vital life.   Provide psychoeducation on how living by understanding the difference between shame and guilt is essential in to maintaining sobriety and how challenging  the destructive power of stigma can increase one's internal motivation for sobriety and a heathy ongoing recovery.     Expected therapeutic outcomes:   Minimize while normalizing the severity and occurrence of PAWS symptoms such as sleep disruption, anhedonia, anxiety, mood swings, low energy, etc, as per patient's self-report in check-ins.     Growth in patient understanding of the importance in having a  sober network  of recovery friends, meetings and activities that play a role in the maintenance of ongoing sobriety through sober supportive friendships, meetings and activities that build a balanced life that includes healthy social connections in recovery, as evidenced by patients' self-report when discussing  recovery environment  in check ins.    A reduction in cravings and use episodes per patient self-report and/or are observed through UA testing while the patient is engaged in treatment.       Group Attendance:  Attended group session    Patient's response to the group topic/interactions:  discussed personal experience with topic and expressed understanding of topic    Patient appeared to be Actively participating and Engaged.        Client specific details: Patient checked in reporting ongoing sobriety. She reports that she experienced low level of anxiety and little stress in the past week. She expressed she has let go of shameful feelings about her alcoholism and liver disease as she now recognizes she did not choose to  "become an alcoholic. She shared that she feels like the stigma around alcoholism is like it is with weight, that \"stigma is still hard.\" She shared that she is protective of who she shares her health conditions with and that she just recently let some trustworthy friends in on her current health issues and that she found them supportive and sympathetic. She shared that her peers in treatment don't express sympathy about her health status but express empathy, which she appreciates. Throughout this session patient worked on her Dimension Three and Dimension 5 Treatment Plan Goals. Patient expressed in this session that what she got out of treatment all of last week was feeling grateful for \"how much I appreciate this group and their support.\"     "

## 2023-08-23 ENCOUNTER — OFFICE VISIT (OUTPATIENT)
Dept: CARDIOLOGY | Facility: CLINIC | Age: 58
End: 2023-08-23
Payer: COMMERCIAL

## 2023-08-23 ENCOUNTER — HOSPITAL ENCOUNTER (OUTPATIENT)
Dept: BEHAVIORAL HEALTH | Facility: CLINIC | Age: 58
Discharge: HOME | End: 2023-08-23
Attending: FAMILY MEDICINE
Payer: COMMERCIAL

## 2023-08-23 VITALS
HEIGHT: 66 IN | WEIGHT: 184.8 LBS | DIASTOLIC BLOOD PRESSURE: 73 MMHG | SYSTOLIC BLOOD PRESSURE: 109 MMHG | HEART RATE: 75 BPM | BODY MASS INDEX: 29.7 KG/M2

## 2023-08-23 DIAGNOSIS — I10 ESSENTIAL HYPERTENSION WITH GOAL BLOOD PRESSURE LESS THAN 140/90: ICD-10-CM

## 2023-08-23 DIAGNOSIS — I77.810 ASCENDING AORTA DILATION (H): Primary | ICD-10-CM

## 2023-08-23 DIAGNOSIS — E87.1 HYPONATREMIA: ICD-10-CM

## 2023-08-23 PROCEDURE — 93000 ELECTROCARDIOGRAM COMPLETE: CPT | Performed by: INTERNAL MEDICINE

## 2023-08-23 PROCEDURE — H2035 A/D TX PROGRAM, PER HOUR: HCPCS | Mod: HQ

## 2023-08-23 PROCEDURE — 99214 OFFICE O/P EST MOD 30 MIN: CPT | Performed by: INTERNAL MEDICINE

## 2023-08-23 NOTE — LETTER
8/23/2023    Linda Macdonald, APRN CNP  [address] [facility] [id number]    RE: Abiola Matute       Dear Colleague,     I had the pleasure of seeing Abiola Matute in the [facility].  HPI and Plan:   This is a very nice 58-year-old woman who is referred for evaluation for ascending aortic dilatation noted on a stress echocardiogram.    Virginia has cirrhosis of the liver and is undergoing evaluation for possible cancer.  There initially was thought that she may need a liver transplant but apparently this is not the plan currently.  She is scheduled for an endoscopy soon.    Virginia has a family history of her father who was a long-term smoker having a myocardial infarction undergoing coronary bypass grafting and ultimately dying of congestive heart failure at 65.  Her mother is alive and well at 90.  Virginia is a lifelong non-smoker.  She has treated hypertension and normal cholesterol with an LDL of 80 and HDL of 53.    Previous cardiac work-up includes a stress echo done earlier this year for which she went 7 minutes was asymptomatic without EKG changes or echocardiographic changes.  The echo describes mild AI with an ascending aorta at 4.0.  Patient also had a CT scan done earlier this year to rule out pulmonary embolus.  That CT scan describes an ascending aorta of 3.9 x 3.9.  She also had a CT angiogram done earlier this year describing a calcium score of 0.  No coronary artery disease.  And a normal ascending aorta.    Virginia has no exertional chest arm neck jaw or shoulder discomfort.  No dyspnea on exertion orthopnea or PND.  No palpitations lightheadedness dizziness syncope or near syncope.    Assessment and plan.  The good news for Virginia is the guidelines have changed in the last year and they do not consider the ascending aorta dilated until it is greater than 4.0.  Her CT scans are more accurate measurements than the echocardiogram and both measure her ascending aorta to be less  than 4.0.  I do not think she needs any specific treatment other than continue to treat her hypertension which is well treated.  I do not think she necessarily even needs a follow-up evaluation of her ascending aorta.  If anything repeat CT scan in 5 years.    She has no coronary disease based on her CT angiogram.    She does have mild hyponatremia at 133.  This is probably due to her diuretic regiment.    Cholesterol is excellent for primary prevention.    EKG is normal sinus rhythm and a normal EKG    At this time I do think she needs no further cardiac testing or follow-up.  If she should develop any problems we will be glad to see her back. Thank you for allow me to participate in this patient's care.  Sincerely,                               Kevin Le MD [facility]    Today's clinic visit entailed:  Review of external notes as documented elsewhere in note  Review of the result(s) of each unique test - stress echo, CT scans, lab work  Ordering of each unique test  Prescription drug management  30 minutes spent by me on the date of the encounter doing chart review, history and exam, documentation and further activities per the note  Provider  Link to [facility] Help Grid           Orders Placed This Encounter   Procedures    EKG 12-lead complete w/read - Clinics (performed today)       No orders of the defined types were placed in this encounter.      There are no discontinued medications.      Encounter Diagnosis   Name Primary?    Ascending aorta dilation (H)        CURRENT MEDICATIONS:  Current Outpatient Medications   Medication Sig Dispense Refill    ammonium lactate (AMLACTIN) 12 % external cream Apply topically 2 times daily 385 g 3    clobetasol (TEMOVATE) 0.05 % external cream Apply to AA BID x 1-2 weeks then PRN. Do not apply to face. 60 g 3    furosemide (LASIX) 40 MG tablet TAKE 1 TABLET BY MOUTH  DAILY (Patient taking differently: Take 40 mg by mouth every morning) 90 tablet 3    inFLIXimab (REMICADE) 100  MG injection Inject into the vein every 28 (twenty-eight) days Last dose 8/7/23      Multiple Vitamins-Minerals (MULTIVITAMIN & MINERAL PO) Take 1 tablet by mouth every morning      spironolactone (ALDACTONE) 100 MG tablet Take 1 tablet (100 mg) by mouth every morning 90 tablet 3    triamcinolone (KENALOG) 0.1 % external cream Apply to AA BID x 1-2 week then PRN only 80 g 3    UNABLE TO FIND Take 1 tablet by mouth every morning MEDICATION NAME: [place]         ALLERGIES     Allergies   Allergen Reactions    Fish Oil      Redness and itching around eye area only - went away when fish oil capsules stopped     Metronidazole      pain/itching    Ppd [Tuberculin Purified Protein Derivative]     Sulfa Antibiotics      hives    Terbinafine And Related      Lamisil = mild urticarial reaction       PAST MEDICAL HISTORY:  Past Medical History:   Diagnosis Date    Alcohol abuse     Alcoholic cirrhosis of liver with ascites     Anal fistula     Atypical ductal hyperplasia of breast 09/10/2010    ERT not recommended -left - and flat epithelial atypia-scheduled for breast biopsy 9/17/2010     Bifid uvula     Cholelithiasis     Contact perianal dermatitis and other eczema     recurrent - clobetasol     Crohn disease     Fear of flying      - gets Ativan prn.     HCC (hepatocellular carcinoma) (H) 2/1/2023    Hypertension     IBS (irritable bowel syndrome)        PAST SURGICAL HISTORY:  Past Surgical History:   Procedure Laterality Date    BIOPSY ANAL N/A 3/28/2019    anal biopsy and culure placement of seton - Dr Fleming    BIOPSY BREAST Left 09/17/2010    - scheduled with Dr. Varma     BIOPSY LIVER  2019    COLONOSCOPY  2006    COLONOSCOPY N/A 12/23/2014    Procedure: COMBINED COLONOSCOPY, SINGLE OR MULTIPLE BIOPSY/POLYPECTOMY BY BIOPSY;  Surgeon: Diane Fleming MD;  Location:  GI    COLONOSCOPY N/A 12/5/2019    Procedure: COLONOSCOPY, WITH POLYPECTOMY AND BIOPSY;  Surgeon: Farhan Schilling MD;  Location:   GI    ENDOSCOPIC RETROGRADE CHOLANGIOPANCREATOGRAM N/A 4/24/2020    Procedure: ENDOSCOPIC RETROGRADE CHOLANGIOPANCREATOGRAPHY WITH, sledge removal,sphincterotomy, stent in gallbladder and pancreatic duct stent, and balloon dilation;  Surgeon: Guru Isac Kraft MD;  Location: [facility] OR    ESOPHAGOSCOPY, GASTROSCOPY, DUODENOSCOPY (EGD), COMBINED N/A 12/5/2019    Procedure: ESOPHAGOGASTRODUODENOSCOPY (EGD);  Surgeon: Farhan Schilling MD;  Location: [facility] GI    ESOPHAGOSCOPY, GASTROSCOPY, DUODENOSCOPY (EGD), COMBINED N/A 5/11/2023    Procedure: Esophagoscopy, gastroscopy, duodenoscopy (EGD), combined;  Surgeon: Jeannette Cervantes MD;  Location: [facility] GI    EXAM UNDER ANESTHESIA ANUS N/A 3/28/2019    Procedure: EXAM UNDER ANESTHESIA ANUS;  Surgeon: Diane Fleming MD;  Location:  OR    EXAM UNDER ANESTHESIA ANUS N/A 2/26/2021    Procedure: EXAM UNDER ANESTHESIA OF ANUS, SETON PLACEMENT, EXCISION OF SKIN BRIDGE;  Surgeon: Avtar Nam MD;  Location: [facility] OR    EXAM UNDER ANESTHESIA ANUS N/A 1/6/2023    Procedure: EXAM UNDER ANESTHESIA, ANUS, SETON EXCHANGE, partial fistulotomy;  Surgeon: Mary Dugan MD;  Location: [facility] OR    HYSTERECTOMY, VAGINAL  2006    with Dr. Licha Zhou - with BSO for fibroids     IR GALLBLADDER DRAIN PLACEMENT  4/22/2020    IR SIRT (SELECTIVE INTERNAL RADIO THERAPY)  2/21/2023    IR VISCERAL ANGIOGRAM  2/21/2023    IR VISCERAL EMBOLIZATION  2/27/2023    OPEN REDUCTION INTERNAL FIXATION ANKLE Left at age 27    plates and screws removed at age 36    Pelviscopy with removal of bilateral hydrosalpinges.  04/15/2010    ZZC APPENDECTOMY  at age 14       FAMILY HISTORY:  Family History   Problem Relation Age of Onset    Breast Cancer Mother     Gastrointestinal Disease Mother     Heart Disease Father 57    Hypertension Maternal Grandmother     Substance Abuse Maternal Grandfather     Diabetes Maternal Grandfather     Diabetes Paternal Grandmother     Heart  Disease Paternal Grandfather     Cancer Sister     Skin Cancer Sister     Substance Abuse Maternal Uncle     Substance Abuse Maternal Uncle     Suicide Niece     Suicide Niece     Anesthesia Reaction No family hx of     Thrombosis No family hx of        SOCIAL HISTORY:  Social History     Socioeconomic History    Marital status:      Spouse name: Albino    Number of children: 3    Years of education: 14    Highest education level: None   Occupational History    Occupation: unemployed   Tobacco Use    Smoking status: Never     Passive exposure: Never    Smokeless tobacco: Never   Vaping Use    Vaping Use: Never used   Substance and Sexual Activity    Alcohol use: Not Currently    Drug use: No    Sexual activity: Yes     Partners: Male     Birth control/protection: Post-menopausal     Comment: husb had vasectomy   Other Topics Concern     Service No    Blood Transfusions No    Caffeine Concern No     Comment: rarely drinks caffeine    Exercise Yes     Comment: does a lot of walking - 4x/week    Seat Belt Yes     Comment: always    Self-Exams Yes     Comment: SBE encouraged monthly    Parent/sibling w/ CABG, MI or angioplasty before 65F 55M? Yes   Social History Narrative    calcium - drinks 5-6 large glasses skim milk/day    flex sig/colonoscopy -at age 49    sun precautions - discussed    mammogram - needs every 2 years in her 30's, then yearly from then on    Td booster - 9/99 and 4/27/2010    pneumovax -at age 59    DEXA -when perimenopausal    stool hemoccults - every year after age 40    ASA- start at age 40    mulvitamin - encouraged     Social Determinants of Health     Financial Resource Strain: Low Risk  (9/15/2020)    Overall Financial Resource Strain (CARDIA)     Difficulty of Paying Living Expenses: Not very hard   Food Insecurity: No Food Insecurity (9/15/2020)    Hunger Vital Sign     Worried About Running Out of Food in the Last Year: Never true     Ran Out of Food in the Last Year: Never  "true   Transportation Needs: No Transportation Needs (9/15/2020)    PRAPARE - Transportation     Lack of Transportation (Medical): No     Lack of Transportation (Non-Medical): No   Physical Activity: Unknown (9/15/2020)    Exercise Vital Sign     Days of Exercise per Week: 0 days   Stress: Stress Concern Present (9/15/2020)    [facility] [employer] - Occupational Stress Questionnaire     Feeling of Stress : Rather much   Social Connections: Unknown (9/15/2020)    Social Connection and Isolation Panel [NHANES]     Frequency of Communication with Friends and Family: More than three times a week     Frequency of Social Gatherings with Friends and Family: More than three times a week       Review of Systems:  Skin:  Positive for   Seeing a dermatologist   Eyes:  Positive for glasses    ENT:  Negative      Respiratory:  Negative       Cardiovascular:  Negative      Gastroenterology: Negative      Genitourinary:  Negative      Musculoskeletal:  Positive for joint pain    Neurologic:  Positive for headaches    Psychiatric:  Positive for anxiety Some stress  Heme/Lymph/Imm:  Negative      Endocrine:  Negative        Physical Exam:  Vitals: /73   Pulse 75   Ht 1.676 m (5' 6\")   Wt 83.8 kg (184 lb 12.8 oz)   LMP 05/31/2006   BMI 29.83 kg/m      Constitutional:  cooperative, alert and oriented, well developed, well nourished, in no acute distress overweight      Skin:  warm and dry to the touch, no apparent skin lesions or masses noted          Head:  normocephalic, no masses or lesions        Eyes:  pupils equal and round, conjunctivae and lids unremarkable, sclera white, no xanthalasma, EOMS intact, no nystagmus        Lymph:      ENT:  no pallor or cyanosis, dentition good        Neck:  no carotid bruit        Respiratory:  normal breath sounds, clear to auscultation, normal A-P diameter, normal symmetry, normal respiratory excursion, no use of accessory muscles         Cardiac: regular " rhythm;no murmurs, gallops or rubs detected                pulses full and equal                                        GI:           Extremities and Muscular Skeletal:  no edema;no spinal abnormalities noted;normal muscle strength and tone              Neurological:  no gross motor deficits        Psych:  affect appropriate, oriented to time, person and place        CC  Yarely Shah MD  [address] [place] [id number]      Thank you for allowing me to participate in the care of your patient.      Sincerely,     Kevin Le MD     [facility]

## 2023-08-23 NOTE — PROGRESS NOTES
HPI and Plan:   This is a very nice 58-year-old woman who is referred for evaluation for ascending aortic dilatation noted on a stress echocardiogram.    Virginia has cirrhosis of the liver and is undergoing evaluation for possible cancer.  There initially was thought that she may need a liver transplant but apparently this is not the plan currently.  She is scheduled for an endoscopy soon.    Virginia has a family history of her father who was a long-term smoker having a myocardial infarction undergoing coronary bypass grafting and ultimately dying of congestive heart failure at 65.  Her mother is alive and well at 90.  Virginia is a lifelong non-smoker.  She has treated hypertension and normal cholesterol with an LDL of 80 and HDL of 53.    Previous cardiac work-up includes a stress echo done earlier this year for which she went 7 minutes was asymptomatic without EKG changes or echocardiographic changes.  The echo describes mild AI with an ascending aorta at 4.0.  Patient also had a CT scan done earlier this year to rule out pulmonary embolus.  That CT scan describes an ascending aorta of 3.9 x 3.9.  She also had a CT angiogram done earlier this year describing a calcium score of 0.  No coronary artery disease.  And a normal ascending aorta.    Virginia has no exertional chest arm neck jaw or shoulder discomfort.  No dyspnea on exertion orthopnea or PND.  No palpitations lightheadedness dizziness syncope or near syncope.    Assessment and plan.  The good news for Virginia is the guidelines have changed in the last year and they do not consider the ascending aorta dilated until it is greater than 4.0.  Her CT scans are more accurate measurements than the echocardiogram and both measure her ascending aorta to be less than 4.0.  I do not think she needs any specific treatment other than continue to treat her hypertension which is well treated.  I do not think she necessarily even needs a follow-up evaluation of her ascending aorta.  If  anything repeat CT scan in 5 years.    She has no coronary disease based on her CT angiogram.    She does have mild hyponatremia at 133.  This is probably due to her diuretic regiment.    Cholesterol is excellent for primary prevention.    EKG is normal sinus rhythm and a normal EKG    At this time I do think she needs no further cardiac testing or follow-up.  If she should develop any problems we will be glad to see her back. Thank you for allow me to participate in this patient's care.  Sincerely,                               Kevin Le MD [facility]    Today's clinic visit entailed:  Review of external notes as documented elsewhere in note  Review of the result(s) of each unique test - stress echo, CT scans, lab work  Ordering of each unique test  Prescription drug management  30 minutes spent by me on the date of the encounter doing chart review, history and exam, documentation and further activities per the note  Provider  Link to [facility] Help Grid           Orders Placed This Encounter   Procedures    EKG 12-lead complete w/read - Clinics (performed today)       No orders of the defined types were placed in this encounter.      There are no discontinued medications.      Encounter Diagnosis   Name Primary?    Ascending aorta dilation (H)        CURRENT MEDICATIONS:  Current Outpatient Medications   Medication Sig Dispense Refill    ammonium lactate (AMLACTIN) 12 % external cream Apply topically 2 times daily 385 g 3    clobetasol (TEMOVATE) 0.05 % external cream Apply to AA BID x 1-2 weeks then PRN. Do not apply to face. 60 g 3    furosemide (LASIX) 40 MG tablet TAKE 1 TABLET BY MOUTH  DAILY (Patient taking differently: Take 40 mg by mouth every morning) 90 tablet 3    inFLIXimab (REMICADE) 100 MG injection Inject into the vein every 28 (twenty-eight) days Last dose 8/7/23      Multiple Vitamins-Minerals (MULTIVITAMIN & MINERAL PO) Take 1 tablet by mouth every morning      spironolactone (ALDACTONE) 100 MG  tablet Take 1 tablet (100 mg) by mouth every morning 90 tablet 3    triamcinolone (KENALOG) 0.1 % external cream Apply to AA BID x 1-2 week then PRN only 80 g 3    UNABLE TO FIND Take 1 tablet by mouth every morning MEDICATION NAME: [place]         ALLERGIES     Allergies   Allergen Reactions    Fish Oil      Redness and itching around eye area only - went away when fish oil capsules stopped     Metronidazole      pain/itching    Ppd [Tuberculin Purified Protein Derivative]     Sulfa Antibiotics      hives    Terbinafine And Related      Lamisil = mild urticarial reaction       PAST MEDICAL HISTORY:  Past Medical History:   Diagnosis Date    Alcohol abuse     Alcoholic cirrhosis of liver with ascites     Anal fistula     Atypical ductal hyperplasia of breast 09/10/2010    ERT not recommended -left - and flat epithelial atypia-scheduled for breast biopsy 9/17/2010     Bifid uvula     Cholelithiasis     Contact perianal dermatitis and other eczema     recurrent - clobetasol     Crohn disease     Fear of flying      - gets Ativan prn.     HCC (hepatocellular carcinoma) (H) 2/1/2023    Hypertension     IBS (irritable bowel syndrome)        PAST SURGICAL HISTORY:  Past Surgical History:   Procedure Laterality Date    BIOPSY ANAL N/A 3/28/2019    anal biopsy and culure placement of seton - Dr Fleming    BIOPSY BREAST Left 09/17/2010    - scheduled with Dr. Varma     BIOPSY LIVER  2019    COLONOSCOPY  2006    COLONOSCOPY N/A 12/23/2014    Procedure: COMBINED COLONOSCOPY, SINGLE OR MULTIPLE BIOPSY/POLYPECTOMY BY BIOPSY;  Surgeon: Diane Fleming MD;  Location:  GI    COLONOSCOPY N/A 12/5/2019    Procedure: COLONOSCOPY, WITH POLYPECTOMY AND BIOPSY;  Surgeon: Farhan Schilling MD;  Location:  GI    ENDOSCOPIC RETROGRADE CHOLANGIOPANCREATOGRAM N/A 4/24/2020    Procedure: ENDOSCOPIC RETROGRADE CHOLANGIOPANCREATOGRAPHY WITH, sledge removal,sphincterotomy, stent in gallbladder and pancreatic duct stent, and  balloon dilation;  Surgeon: Guru Isac Kraft MD;  Location: [facility] OR    ESOPHAGOSCOPY, GASTROSCOPY, DUODENOSCOPY (EGD), COMBINED N/A 12/5/2019    Procedure: ESOPHAGOGASTRODUODENOSCOPY (EGD);  Surgeon: Farhan Schilling MD;  Location: [facility] GI    ESOPHAGOSCOPY, GASTROSCOPY, DUODENOSCOPY (EGD), COMBINED N/A 5/11/2023    Procedure: Esophagoscopy, gastroscopy, duodenoscopy (EGD), combined;  Surgeon: Jeannette Cervantes MD;  Location: [facility] GI    EXAM UNDER ANESTHESIA ANUS N/A 3/28/2019    Procedure: EXAM UNDER ANESTHESIA ANUS;  Surgeon: Diane Fleming MD;  Location:  OR    EXAM UNDER ANESTHESIA ANUS N/A 2/26/2021    Procedure: EXAM UNDER ANESTHESIA OF ANUS, SETON PLACEMENT, EXCISION OF SKIN BRIDGE;  Surgeon: Avtar Nam MD;  Location: [facility] OR    EXAM UNDER ANESTHESIA ANUS N/A 1/6/2023    Procedure: EXAM UNDER ANESTHESIA, ANUS, SETON EXCHANGE, partial fistulotomy;  Surgeon: Mary Dugan MD;  Location: [facility] OR    HYSTERECTOMY, VAGINAL  2006    with Dr. Licha Zhou - with BSO for fibroids     IR GALLBLADDER DRAIN PLACEMENT  4/22/2020    IR SIRT (SELECTIVE INTERNAL RADIO THERAPY)  2/21/2023    IR VISCERAL ANGIOGRAM  2/21/2023    IR VISCERAL EMBOLIZATION  2/27/2023    OPEN REDUCTION INTERNAL FIXATION ANKLE Left at age 27    plates and screws removed at age 36    Pelviscopy with removal of bilateral hydrosalpinges.  04/15/2010    ZZC APPENDECTOMY  at age 14       FAMILY HISTORY:  Family History   Problem Relation Age of Onset    Breast Cancer Mother     Gastrointestinal Disease Mother     Heart Disease Father 57    Hypertension Maternal Grandmother     Substance Abuse Maternal Grandfather     Diabetes Maternal Grandfather     Diabetes Paternal Grandmother     Heart Disease Paternal Grandfather     Cancer Sister     Skin Cancer Sister     Substance Abuse Maternal Uncle     Substance Abuse Maternal Uncle     Suicide Niece     Suicide Niece     Anesthesia Reaction No family hx of      Thrombosis No family hx of        SOCIAL HISTORY:  Social History     Socioeconomic History    Marital status:      Spouse name: Albino    Number of children: 3    Years of education: 14    Highest education level: None   Occupational History    Occupation: unemployed   Tobacco Use    Smoking status: Never     Passive exposure: Never    Smokeless tobacco: Never   Vaping Use    Vaping Use: Never used   Substance and Sexual Activity    Alcohol use: Not Currently    Drug use: No    Sexual activity: Yes     Partners: Male     Birth control/protection: Post-menopausal     Comment: husb had vasectomy   Other Topics Concern     Service No    Blood Transfusions No    Caffeine Concern No     Comment: rarely drinks caffeine    Exercise Yes     Comment: does a lot of walking - 4x/week    Seat Belt Yes     Comment: always    Self-Exams Yes     Comment: SBE encouraged monthly    Parent/sibling w/ CABG, MI or angioplasty before 65F 55M? Yes   Social History Narrative    calcium - drinks 5-6 large glasses skim milk/day    flex sig/colonoscopy -at age 49    sun precautions - discussed    mammogram - needs every 2 years in her 30's, then yearly from then on    Td booster - 9/99 and 4/27/2010    pneumovax -at age 59    DEXA -when perimenopausal    stool hemoccults - every year after age 40    ASA- start at age 40    mulvitamin - encouraged     Social Determinants of Health     Financial Resource Strain: Low Risk  (9/15/2020)    Overall Financial Resource Strain (CARDIA)     Difficulty of Paying Living Expenses: Not very hard   Food Insecurity: No Food Insecurity (9/15/2020)    Hunger Vital Sign     Worried About Running Out of Food in the Last Year: Never true     Ran Out of Food in the Last Year: Never true   Transportation Needs: No Transportation Needs (9/15/2020)    PRAPARE - Transportation     Lack of Transportation (Medical): No     Lack of Transportation (Non-Medical): No   Physical Activity: Unknown  "(9/15/2020)    Exercise Vital Sign     Days of Exercise per Week: 0 days   Stress: Stress Concern Present (9/15/2020)    [employer] [place] [employer] - Occupational Stress Questionnaire     Feeling of Stress : Rather much   Social Connections: Unknown (9/15/2020)    Social Connection and Isolation Panel [NHANES]     Frequency of Communication with Friends and Family: More than three times a week     Frequency of Social Gatherings with Friends and Family: More than three times a week       Review of Systems:  Skin:  Positive for   Seeing a dermatologist   Eyes:  Positive for glasses    ENT:  Negative      Respiratory:  Negative       Cardiovascular:  Negative      Gastroenterology: Negative      Genitourinary:  Negative      Musculoskeletal:  Positive for joint pain    Neurologic:  Positive for headaches    Psychiatric:  Positive for anxiety Some stress  Heme/Lymph/Imm:  Negative      Endocrine:  Negative        Physical Exam:  Vitals: /73   Pulse 75   Ht 1.676 m (5' 6\")   Wt 83.8 kg (184 lb 12.8 oz)   LMP 05/31/2006   BMI 29.83 kg/m      Constitutional:  cooperative, alert and oriented, well developed, well nourished, in no acute distress overweight      Skin:  warm and dry to the touch, no apparent skin lesions or masses noted          Head:  normocephalic, no masses or lesions        Eyes:  pupils equal and round, conjunctivae and lids unremarkable, sclera white, no xanthalasma, EOMS intact, no nystagmus        Lymph:      ENT:  no pallor or cyanosis, dentition good        Neck:  no carotid bruit        Respiratory:  normal breath sounds, clear to auscultation, normal A-P diameter, normal symmetry, normal respiratory excursion, no use of accessory muscles         Cardiac: regular rhythm;no murmurs, gallops or rubs detected                pulses full and equal                                        GI:           Extremities and Muscular Skeletal:  no edema;no spinal abnormalities noted;normal " muscle strength and tone              Neurological:  no gross motor deficits        Psych:  affect appropriate, oriented to time, person and place        CC  Yarely Shah MD  [address] [address]

## 2023-08-23 NOTE — GROUP NOTE
Group Therapy Documentation    Patient left this session at 1:45 to attend a medical appointment. She is billed for the time she was in session.     PATIENT'S NAME: Abiola Matute  MRN:   [id number]  :   1965  ACCT. NUMBER: [id number]  DATE OF SERVICE: 23  START TIME: 12:00 PM  END TIME:  2:00 PM  FACILITATOR(S): Isaac Rowland LADC  TOPIC: BEH Group Therapy  Number of patients attending the group: 6  Group Length:  2 Hours    Group Therapy Type: Addiction and Psychotherapeutic      Attestation:   Dr. De La Rosa - Medical Director - Provides oversight and supervision of care.       Summary of Group / Topics Discussed:    Choices in Recovery, Mindfulness, and Processing ongoing use         Clients checked-in and all but one reporting ongoing sobriety since there last check-in.. One client whose first session was one week ago is struggling with getting sober and reported DOLU as the evening of 23. This client reports drinking up to five whiskey drinks or shots per evening. This client shared about her ongoing use of alcohol and her peers helped this client process their ongoing use while in treatment. Options of going directly to detox or to inpatient or residential treatment were discussed in this session. Clients shared about their struggles getting sober and gave support to the client that was struggling in this session. The topic of addiction as a disease and that alcoholism if looked at as a disease, is about sick people getting well as opposed to bad people getting good. The importance of getting medical and mental health help was openly discussed in this session and health consequences from alcoholism were also discussed. Two clients left early for medical appointments today. Clients listened to and discussed an educational video on mindfulness and shared how they are dealing with stressors in their sobriety. Sober support from treatment and outside community based sober support meetings  were also discussed in today's session.       Group Attendance:  Attended group session    Patient's response to the group topic/interactions:  discussed personal experience with topic, expressed understanding of topic, gave appropriate feedback to peers, and listened actively    Patient appeared to be Actively participating and Engaged.        Client specific details:  Client checked in reporting ongoing sobriety. She shared that she had a medical appointment this morning with her cardiologist and that she will miss next Wednesday's session as she is having surgery to remove a tumor. She left session early today for a medical appointment related to the upcoming surgery. She reports that she is appreciative of the care she has received from the Liver Transplant team and that she trusts those that care for her in this process. At this time she reports she is no longer in need of the Liver Transplant.  Throughout this session client worked on her Dimension 3 and Dimension 5 Treatment Plan Goals. Client gave thoughtful and empathetic feedback to a peer who was discussing the difficulties she was having maintaining sobriety.

## 2023-08-25 NOTE — ADDENDUM NOTE
Encounter addended by: Isaac Rowland LADC on: 8/25/2023 2:20 PM   Actions taken: Clinical Note Signed

## 2023-08-25 NOTE — PROGRESS NOTES
[facility] Weekly Treatment Plan Review      Attestation:  Dr Kami DeL a Rosa. - Provides oversight and supervision of care.        Date Span: Monday: 23 through 23      Date     Group Therapy 2 hours Completed these sessions 2 hours  0 hours 0 hours       Individual Therapy                Family Therapy                 Psychoeducation                 Other (Specify)                  Clients individual sessions noted below. Client had no absences this week    Client has no absences this week    Client has one excused absence this week , due to medical appts.  Client excused from group , due to medical appointment.     Program Running Totals: (No Phase 1 IOP due to this program being only OP level of care)  Total # of Phase 2 OP Group Sessions: 26 for 52  Virginia gets 30 sessions for 60 hours at this time.  She is at 32  Counting her individual she has used 30  sessions and 53 hours  Total # of Phase 3 OP Recovery Management Group Sessions: None    Total # of 1:1 Sessions:  1 hour BALWINDER   1 hour 5/30  7/3,                                     Darinel , , ( & Clem)                    Weekly Treatment Plan Review     Treatment Plan initiated on: .    Dimension1: Acute Intoxication/Withdrawal Potential -   Previous Dimension Ratin  Current Dimension Ratin  Date of Last Use 22  Any reports of withdrawal symptoms - No      Dimension 2: Biomedical Conditions & Complications -   Previous Dimension Ratin  Current Dimension Ratin    Medical Concerns:2023 Reports having no new or worsening health conditions. Reports having an appointment this week on 2023 with her Cardiologist.     wk of  No concerns I did  my pre op eval for my upcoming surgery with Dr. Bear and nurse Sakina Glass  wk of  No new concerns  wk of :  no, just good news that they may be able to remove all  "my tumore and I may not need a liver.  WK of 7/24-Client has an appointment with surgeon regarding increase in tumor. She is excused from programming due time conflict.  wk of 7/17 She had appt with radiology on 7/14, continues to work with her team due to LT, cancer and rash.  Virginia told group she is dealing with some news, medically, but working with her Drs.  She said she isn't wanting to share a whole lot, until she gethers her information, but she did want group to know she has support and appreciates just being able to share what she did  wk 7/10Shlauren reports rash and working with , also had CT and MRI related to ongoing care for cancer and need for liver.  wk of 7/3  I get an infusion 1x per month and usually by week 3, I feel more sore and more tired, but I am grateful for the infusions.  \"no concerns beyond ongoing liver cancer and Crohns\"  wk of 6/12 She saw Jazmin Barrios at Urgent care for skin issues.  She also said she met with her GI practioner  She reports having liver cancer and Crohn's disease.  \"right now I am doing pretty well  Outside medical appointments this week (list provider and reason for visit)   wk of 8/14 No concerns I have my pre op eval with Dr. Glass, for my upcoming surgery   Wk of 8/7 No appts this week   wk of 7/31: no appts.  wk of 7/24 she saw surgeon Junior Bonilla, she is dealing with her liver tumor  wk of 7/17 She had appt with radiology, Dr. Shah,on 7/14, continues to work with her team due to LT, cancer and rash  : wk of 7/6 she saw Susanna RAZA for rash on leg.  Had a PeTH test 7/7, see below. No issues  : 6/13 she saw Jazmin Meng PA-C for a flare up of her psoriasis likely related to her other medical issues, per client     Current Medications & Medication Changes:  Current Outpatient Medications   Medication    ammonium lactate (AMLACTIN) 12 % external cream    clobetasol (TEMOVATE) 0.05 % external cream    furosemide (LASIX) 40 MG tablet    inFLIXimab " (REMICADE) 100 MG injection    Multiple Vitamins-Minerals (MULTIVITAMIN & MINERAL PO)    spironolactone (ALDACTONE) 100 MG tablet    triamcinolone (KENALOG) 0.1 % external cream    UNABLE TO FIND     No current facility-administered medications for this encounter.     Facility-Administered Medications Ordered in Other Encounters   Medication    BREEZA Lemon-Lime flavored oral liquid for Neutral Abdominal/Pelvic Imaging 1,000 mL    hyoscyamine (LEVSIN/SL) sublingual tablet 125 mcg     Medication Prescriber:  Linda Macdonald  Taking meds as prescribed? Yes  Medication side effects or concerns:  no        Dimension 3: Emotional/Behavioral Conditions & Complications -   Previous Dimension Ratin  Current Dimension Ratin  PHQ2:       2023     5:00 PM 2023     8:00 AM 2023     1:34 PM 2023    10:00 AM 3/28/2023    11:21 AM 3/26/2023     1:45 PM 2023     9:35 AM   PHQ-2 (  [employer])   Q1: Little interest or pleasure in doing things 0 0    0 0 0 0 0 0   Q2: Feeling down, depressed or hopeless 0 0    0 0 0 0 0 1   PHQ-2 Score 0 0    0 0 0 0 0 1   Q1: Little interest or pleasure in doing things  Not at all   Not at all  Not at all   Q2: Feeling down, depressed or hopeless  Not at all   Not at all  Several days   PHQ-2 Score  0   0  1      GAD2:       2023     9:54 AM 2023    10:00 AM 2023     8:00 AM 2023     5:00 PM   TAINA-2   Feeling nervous, anxious, or on edge 1 1 0    0    0    0    0 1   Not being able to stop or control worrying 1 0 0    0    0    0    0 0   TAINA-2 Total Score 2 1 0    0    0    0    0 1     PROMIS 10-Global Health (all questions and answers displayed):       2023     9:58 AM 2023     7:32 PM 2023    10:00 AM 2023     8:02 AM 2023     5:00 PM   PROMIS 10   In general, would you say your health is: Good Good  Good    In general, would you say your quality of life is: Good Good  Very good    In general, how would you rate your  physical health? Fair Fair  Fair    In general, how would you rate your mental health, including your mood and your ability to think? Good Good  Very good    In general, how would you rate your satisfaction with your social activities and relationships? Good Good  Excellent    In general, please rate how well you carry out your usual social activities and roles Good Good  Very good    To what extent are you able to carry out your everyday physical activities such as walking, climbing stairs, carrying groceries, or moving a chair? Completely Completely  Mostly    In the past 7 days, how often have you been bothered by emotional problems such as feeling anxious, depressed, or irritable? Sometimes Rarely  Rarely    In the past 7 days, how would you rate your fatigue on average? Mild Mild  Mild    In the past 7 days, how would you rate your pain on average, where 0 means no pain, and 10 means worst imaginable pain? 3 3  3    In general, would you say your health is: 3 3 3 3    3    3    3    3 3   In general, would you say your quality of life is: 3 3 4 4    4    4    4    4 3   In general, how would you rate your physical health? 2 2 3 2    2    2    2    2 3   In general, how would you rate your mental health, including your mood and your ability to think? 3 3 4 4    4    4    4    4 4   In general, how would you rate your satisfaction with your social activities and relationships? 3 3 5 5    5    5    5    5 4   In general, please rate how well you carry out your usual social activities and roles. (This includes activities at home, at work and in your community, and responsibilities as a parent, child, spouse, employee, friend, etc.) 3 3 5 4    4    4    4    4 4   To what extent are you able to carry out your everyday physical activities such as walking, climbing stairs, carrying groceries, or moving a chair? 5 5 4 4    4    4    4    4 3   In the past 7 days, how often have you been bothered by emotional problems  "such as feeling anxious, depressed, or irritable? 3 2 2 2    2    2    2    2 2   In the past 7 days, how would you rate your fatigue on average? 2 2 2 2    2    2    2    2 2   In the past 7 days, how would you rate your pain on average, where 0 means no pain, and 10 means worst imaginable pain? 3 3 4 3    3    3    3    3 2   Global Mental Health Score 12 13 17 17    17    17    17    17 15   Global Physical Health Score 15 15 14 14    14    14    14    14 14   PROMIS TOTAL - SUBSCORES 27 28 31 31    31    31    31    31 29     Mental health diagnosis none  Date of last SIB:  never  Date of  last SI:  never  Date of last HI: never  Behavioral Targets:  see tx plan  Risk factors:  Client dealing with liver disease/cancer and Crohns, client needs a new liver,   Protective factors:  spirituality, forward/future oriented thinking, safe and stable environment, regular physical activity, living with other people and structured day  Current MH Assignments:  see tx plan      Narrative: wk of 8/21/2023 She reports her anxiety is down and she experiencing no stress or depression. Client worked on her Dimension Three goals this week as she learned about and discussed the topics of Shame, Stigma and Mindfulness.       wkof 8/14  Ways I can think differently: listen to my critical voice and change my thoughts  Ways I can do things differently:  talk to myself like I would a friend.  \"no thoughts of self harm or concerns\"  on likert scale 1-low and 10 -high:   Anxiety:  0  stress: 0 depression 0  issues to discuss and helpful coping:  doing fun things, attending this group, being sober  wk of 8/7 ratings on likert scale 1-low and 10 -high: Anxiety: 2 stress: 1 depression 2. She participated in discussion on \"change the channel\" when getting into an anxiety loop. \"It is a simple way to do something more productive. Virginia actively participated in discussion on Thought distortions and discussed which ones are the ones she actively " "wants to focus on for the time being: catastrophising and prediction.  She discussed  two situations where the techniques could be helpful, including: Should's and musts and not setting up unrealistic expectations of how she or life should be.  I want to remind myself \"until someone has walked a mile in my shoes, they don't get to  me and the same goes for me with them.  Virginia actively participated in meditation and said it was good to take that time to be.  \"I liked it\"  wk of 7/31  she participated in psychoeducation on Understanding my anxiety   This topic helped client to gain awareness of thoughts and feelings associated with anxiety and act with awareness when feeling anxiety and use helpful coping.  Client did actively participate in group discussions, and was able  to identify anxious thoughts, physical feelings when having anxiety: racing heart, shaking, confusion  \"why me\"  She was able to  identify 2 helpful coping methods: breath, talk with her  who is a comfort, stop the story telling/negativity when aware.  Virginia discussed that her tumor is shrinking and she is \"cautiously optimistic\". She said she wanted to share that she has many feelings associated with this, \"it even could mean I won't need a LT, but that remains to be seen\"  I told Virginia she has been shown how with our spirituality and belief, sometimes things can happen that were never anticipated.  She said \"so true\" I never had an idea that was even a part of my hope\"      Wk of 7/24- client denies thoughts of self-harm. On Likert scall 1-low and 10-high client rates anxiety 1  depression 0 and stress 2. She shared her new diagnosis of tumor growth has impacts stress and anxiety.   wk of 7/17: no thoughts of self harm or concerns\" on likert scale 1-low and 10 -high:   Anxiety:  5  stress: 5 depression 1  issues to discuss and Helpful coping:  All related to my medical, but I have great medical team and family support.  Client said of " "the reading:I like the ongoing discussions of shame and this reading brought me awareness that I have some to deal with regarding the nature of why many get liver cancer.       wk of 7/10:  She was able to repeat the steps of 5 senses, as a review.   \"no thoughts of self harm or concerns\".  Clients feedback to group member  on likert scale 1-low and 10 -high:   Anxiety:  2  stress: 2  depression 2  issues to discuss and Helpful coping: talking with you guys.  I had a craving that was pretty big this week.  Psychoeducation/Skills The thinking-feeling connection. This topic will address information from the [employer] about how our thoughts influence our feelings.  Client was able to define automatic thoughts and make a connection with some that she has.   She said that she has been continueing to gain awareness of her beliefs vs perceptions vs what is a thought and what is a feeling.  Discussed that reminding herself it is ok to have thoughts and feelings, but they are not facts for other people.    Client participated in lovingkindness meditation and said it was just what she needed today and it that it was helpful and grounding.  \"Thank you for doing it\"    wk of 7/3: Clients participated in 5 senses psychoeducation discussion, participated in guided practice and was able to name 5 parts, and said she feels it will be a helpful tool  \"no thoughts of self harm or concerns\".   I discussed the research on gratitude journals and expressed she has seemed like she has an \"attitude of gratitude\".  She said she feels blessed and hopes she does.  on likert scale 1-low and 10 -high:   Anxiety:  0  stress: 0 depression 0  issues to discuss and Helpful coping: I am learning to say no, and that boundaries are good    wk of 6/26  Attended MH skills group with Darinel Au.    She participated in psychoeducation on Core beliefsThis topic will discuss core beliefs, as a lens through which we see life " "and how that affects our choices.  Virginia said the discussion on core beliefs is something she will spend some time on, as the awareness is helpful.  I told her we will follow up on Wednesday with more that can be helpful when we notice harmful core beliefs.   \"no thoughts of self harm or concerns\"  on likert scale 1-low and 10 -high:   Anxiety:  1 stress: 1 depression 0  issues to discuss and Helpful coping: learning about self, anxiety and stress  wk of 6/19 \"no thoughts of self harm or no concerns\".  She reports on likert scale 1-low and 10 -high:   Anxiety:  1  stress: 1 depression 0  issues to discuss and Helpful coping: getting to know people better in group, hearing other's helpful coping.  Attended MH skills group with Darinel Au.  We discussed anticipating some situations and \"gearing up\" by practicing deep breathing. I reminded client of previous discussions where beliefs often guide how we feel about certain situations, but that we can reframe them, or look at them for the particular situation.      Wk of 6/12  I am feeling much better and less anxiety about group attendance as I continue to attend and get to know others. \"no thoughts of self harm or concerns\". On likert scale 1-low and 10 -high:   Anxiety:  1  stress: 1 depression 1  issues to discuss and Helpful coping:  Breathing, getting to others in group  Wk of 6/6 \"no thoughts of self harm\"  Attended MH skills group with Darinel Au  Week of 5/31/23 :Client reports feeling anxious due to first treatment experience, not knowing what to expect, her  Mother's declining health-needing a placement for higher level of care, spouse recently lost his job-need to obtain COBRA insurance and unable to care for grandchildren because of attending treatment. Client was saw Traci Aguilar and has not seen them in about 6 weeks. To follow up with primary counselor re: seeing psychotherapist or return to [facility].  Client rates the following on " "a scale of 1 (low) to 10 (high):  Anxiety- 5  Depression -1  Stress- 5 as a result of above concerns.   Client participated in psychotherapy/education on self-care, introduction to relaxation meditation-tensing and relaxing muscles, and sleep hygiene.  Current Mental Health symptoms include: . Active interventions to stabilize mental health symptoms this week :   At SI her PHQ-2 score was 0 and his TAINA-2 score was 1.  PROMISE 10 was  31:  She reports that at this time she is dealing with her mother's living situation and health issues. She reports that her father has passed away and that she is estranged from 1 sister.         Dimension 4: Treatment Acceptance / Resistance -   Previous Dimension Ratin  Current Dimension Ratin  RENEE Diagnosis:  Alcohol Use Disorder   303.90 (F10.20) Severe In a controlled environment  Commitment to tx process/Stage of change- contemplation  RENEE assignments - see tx plan      Narrative - wk of 2023 Client expressed this week about her alcoholism that that she liikes that \"I'm getting better not as shameful.\" That for her it has helped to accept alcoholism, \"occurs as a disease.\"     wk of   Virginia told group \"I know I am an alcoholic\" she said she is so glad to have had to find her way to the group, even if it was through health issues or she would still be doing damage.  motivation for sobriety; 10 she likes being sober and reports she likes learning and attending group.   wk of   motivation for sobriety 10.  Motivation is high to have quality of life  wk of   on likert scale 1-low and 10 -high:  motivation for sobriety: 10   WK of -motivation 10 on Likert scale.she expressed strong commitment to sobriety, see notes dated  for more details.  wk of :  on likert scale 1-low and 10 -high:  motivation for sobriety: 10+  wk of 7/10  My motivation for sobriety is 10/10\".  I think it is so important to attend and be dedicated to support in this group, and " "to \"say things outloud, so it takes the power away, as you say, Alana\"  wk of 7/3  on likert scale 1-low and 10 -high:  motivation for sobriety 10.  Virginia said  session really stuck with her and she thought about a lot of things and is really understanding why attendance and building on the days and lessons is important.     wk of   sober support motivation:  2, but I think motivation will grow as I keep settling in and I am motivated 10/10 for sobreity.    Wk of :  Client reports motivation for sobriety is 10 (high) Motivation for attending community support 5 (on scale 1 low-10 high).   Virginia expressed to her group how helpful they are to her and how much she appreciates all that members shared about their stories.  She said she gains such helpful insight for self   Week of  Virginia discussed that she has been late a couple times, but realized with 2 other members gone, how this speaks to the importance of being on time and valuing and respecting others time, too.   She will also miss tomorrows group due to a long awaited medical appt. Dim 4: on likert scale 1-low and 10 -high:  Motivation for sobriety:  10  motivation for sober support attendance: not doing yet  Wk of   Virginia also said she finds motivation for sobriety in her family and friends, \"but really deep down need to do this for myself\"  Told counselor and group how she didn't think she would get much out of tx and already is getting so much and is grateful for older adult group   Week of 23: Client reports motivation for sobriety is 10 (high) Motivation for attending community support 5 (on scale 1 low-10 high).  The patient is motivated, to explore treatment.  She said \"of course I am partly here to fulfill requirements for liver transplant, but I want to learn healthy coping for lifelong sobriety.      Dimension 5: Relapse / Continued Problem Potential -   Previous Dimension Ratin  Current Dimension Ratin  Relapses this " "week - None  Urges to use - None  UA results -   No results found for this or any previous visit (from the past 168 hour(s)).    Using ReSet Clem: N/A    Narrative-   Wk of 8/21/2023 She reports having no cravings to drink this week and having a \"wonderful week.\"       Wk of 8/14   holley discussed a big warning sign for self is thinking I can drink again, especially socially.  II think about  it regularly.  Group discussed that awareness of this is good and it is likely normal to do this.  I encouraged her to continue to discuss this and down the road to keep in mind with sponsor, therapist, etc. And work through it.  I told her it is really good she can say this outloud.   Something I am doing well: having plans of structure, especially when around situations I used to drink in     any cravings, warning signs, triggers: the [place] could have been but wasn't .  I have supportive people and that helps, but so does the realization of my use patterns and how I feel better not using  wk of 8/7 Virginia said that a trigger for her was going to the [employer] for a comedy show. \"I haven't been there since starting tx\"  My  and I just kept walking and he also said \"I got you\".  She said her cravings only lasted a couple minutes.  We discussed how this can be common and some group members said they cannot go to the same places at all because of that kind of trigger.  She said \"I get it, and will continue to be aware of situations like this   wk of 7/31   cravings, warning signs, triggers: None this week   WK of 7/24-client denies triggers, cravings or warning signs this past week.  wk of 7/17:  cravings, warning signs, triggers:i went to a musical festival, had a few cravings, but also had insight when watching others use, \"glad it isn't me\"  Wk of 7/10 Had a PeTH test 7/7, see above. No issues. She discussed the Serenity Prayer and that she finds it helpful.  She also discussed having cravings at a 4 of 10 when " "cooking, and what she found helpful:  Processed, talked with , talk with group today about it.   Virginia said she really had quite a learning week with events in group, discussions and assignments.  \"My heart goes out to the new person the other day, but also I had some anger about all the disruptions and negativity from her\"  \"I am ready to move on, and learn what I learned, and know that could be any one of us\"  wk of 7/5  cravings, warning signs, triggers: none.  My adult kids are caring and asked if I thought I would be OK going to [facility] [employer] [place], as there is drinking there.  She told them she appreciates the concern, but feels with them and little to no triggers, for quite some time, she didn't feel it was a problem.  Virginia said she had a wonderful time with her family and is not concerned about use over 4th of July holiday either, she has a good plan for spending time with family and focusing on good food.   wk of 6/26  Dim 5: cravings, warning signs, triggers: none.  Wed reading was focused blame.  It said blaming is hiding, that when we blame others we try to hide our character defects.  We discussed the meaning of character defects. \"I was in big denial for a long time, it took being told I would need a new liver for me to really realize what drinking was doing. \"it hit me like a ton of bricks when I finally really heard it\"  wk of 6/19  She participated in  Psychoeducation/Skills: Self-Care.  Client s completed self-care assessment and described self-care as feeling better physically. Client identified physical self-care they do well- attending medical appointments, one they do poorly or needs improvement- eating healthy food and barriers that prevent themselves from self-care - cost of food is expensive, the shopping and prep.  This topic will give a general overview of self-care: physical, environmental, emotional, social and spiritual. It will explore importance of self-care and the impact on " "recovery.   Wk of . Dim 5: warning signs, triggers and cravings: none   helpful coping: attending group. Examples of benefits of drinking or drug use, or other behaviors Physical, Social mental or emotional: confidence, escape from reality, \"friends\"     Main drawbacks connected to these: not real friends that only want to drink together.  Headaches and slurred speech.  Remoarse  Benefits connected to abstinence from addictive behaviors:more able to do me and be me, I enjoy trud friends, I remember things better and will have better memories      Drawbacks you see connected with abstinence from these:  Wk of she participated in the daily reading and said she liked it.  Virginia also gave meaningful feedback to client who discussed use episode: \"it could be anyone of us\"  She said this is important and she is learning from others sharing.  Week of 23: Client denies riggers or warning signs this past week. She said she liked the saying \"it is just as easy to create good habits as bad ones\"   Patient reports family members and her  are concerned about their substance use.  Patient reports their recovery goals are \"abstinence forever.\"  She has had no previous detoxes or treatments and needs to gain insight into healthy coping for relapse prevention.      Dimension 6: Recovery Environment -   Previous Dimension Ratin  Current Dimension Ratin  Family Involvement - not at this time  Summarize attendance at family groups and family sessions - NA  Family supportive of treatment?  Yes    Community support group attendance - no  Recreational activities - some camping, cooking and boating    Narrative - wk of  Client worked on her dimension 6 goals this week as she learned and discussed more about sober support. She continues to report a low desire to attend outside community based sober support meetings, however she does appear to be open to learning more about it.     wk of  [place] discussed " "that she went to [place] with friends who were drinking, but that they all said they would not need to drink. She said she appreciated that but it really didn't bother her in the least, as she likes being sober. Group discussed this type of situation and some group members said it would have been hard for them. [place] said if it ever is, she will change what she does or ask them not to use. \"I have very dear friends and I know they mean it when they say the don't need to drink around me.  sober support motivation: 5   8/7 \"I am at 5 on wanting to attend community support, but find group support very helpful!  wk of 7/31  sober support motivation:  I have so much going on with medical and this meeting 3x per week, that realistically, I will not likely look into community support until after my surgery at the end of the month.   WK of 7/24-client's support are her family, she is not attending support groups and motivation to attend is 2 on Likert scale. Client shared she has learned this week is this is a continuous journey and staying positive is huge. To support her sobriety this week she will continue to surround herself with good people. She is grateful for home, shelter and stability.  wk of 7/17:  sober support motivation: this group and my family.     wk of 7/10: \"I am motivated for outside support at 2/10, but when I get done with Tuesday group, I think my motivtiaon will be higher\"  wk of 7/5   We discussed \"The power of Solitude\" and how spending time alone with ourselves may not be easy or even desirable, it it is key to getting to know self.  I emphasized that studies tell us we need each other to survive and be happy, but also when we loose the ability to be alone with ourselves, sometimes our overstimulated nervous systems suffer from no place to rest and recharge.  She said this is good to discuss, as she may even consider doing it a bit more, as it \"really does recharge me\" She said she is pretty good about " "finding alone time and also uses journaling.  I asked client how this applies to recovery and treatment:She said she will often take a topic from group, or a question and journal about.  She said it really helps   Wk of 6/26. She said sober support motivation is  2, but I think motivation will grow as I keep settling in  I talked with clients about taking responsibility for our actions, so we can be empowered and own our life and also change if we need to. We discussed resources including AA, SMART Recovery.  I encouraged clients to start exploring meetings.  Wk of 6/19.  [place] reports with having group 3x per week, it is a good deal of support, \" I will continue to learn about peer support\"  wk of 6/12client discussed resources sober support, including AA, Liver Transplant support and [place] recovery connection  wk of 6/6  goals for the wknd: look into volunteering at [facility] for writing card.  \"no community support attendance at this time\"   Client is not currently attending community sup[ort and does not have a sponsor. Client's supportive people are her family. Client shared she has friends and has not talked with many about her current situation, because \"I am a private person,\" (treatment and needing transplant).  Patient reports they are involved in community of holden activities    They reports spirituality impacts recovery in the following ways:  \"There was some changes as far as .  We do belong to a [employer] but it's been a number of years since we've been.\"  Patient believes her spirituality and sobriety are connected. Patient reported having 3 children, all adults.  Patient has 2 grandchildren.    She lives with her spouse and son. recreational/leisure activities: camping, cooking, boating, playing board games and card games.  Patient is currently disabled due to her Crohn's Disease.  Patient reports their income is obtained through SSDI disability; spouse.  Patient does not identify finances as a current " stressor.         Progress made on transition planning goals: just began tx    Justification for Continued Treatment at this Level of Care:  No previous tx, needs 6 months of sobriety to be eligible for a transplant, but is having some medical issues that she ocontinues to work on with her medical team needs to learn long term relapse prevention, she reports finding that attending group is helpful for her and she is learning.She said she did not think she would get much out of this but is getting so much support and understanding of healthy coping.  She is not attending community support, at this time.  She is working on goals and assignments.  She gets regular PEth  tests, and there have been no indications of alcohol use.    Treatment coordination activities this week:    7/31 reviewed Onelia Singh notes and discussed client with her.  7/18  Discussed client info with sub Onelia Singh   Discussed client info with margaret Singh  Need for peer recovery support referral? No    Discharge Planning: Not at this time      Has vulnerable adult status change? No    Interdisciplinary Clinical Supervision including:  no    Are Treatment Plan goals/objectives effective? Yes  *If no, list changes to treatment plan:    Are the current goals meeting client's needs? Yes  *If no, list the changes to treatment plan.  Plan:  Continue per Master Treatment Plan    *Client agrees with any changes to the treatment plan: Yes  *Client received copy of changes: Yes  *Client is aware of right to access a treatment plan review: Yes     Staff Members Contributing To Weekly Review:      SOLANGE Hamilton

## 2023-08-28 ENCOUNTER — TELEPHONE (OUTPATIENT)
Dept: GASTROENTEROLOGY | Facility: CLINIC | Age: 58
End: 2023-08-28

## 2023-08-28 ENCOUNTER — HOSPITAL ENCOUNTER (OUTPATIENT)
Dept: BEHAVIORAL HEALTH | Facility: CLINIC | Age: 58
Discharge: HOME | End: 2023-08-28
Attending: FAMILY MEDICINE
Payer: COMMERCIAL

## 2023-08-28 PROCEDURE — H2035 A/D TX PROGRAM, PER HOUR: HCPCS | Mod: HQ

## 2023-08-28 NOTE — TELEPHONE ENCOUNTER
[facility] Call Center    Phone Message    May a detailed message be left on voicemail: yes     Reason for Call: Other: Demi elizabeth [facility] called in regards to pt getting a surgery at the end of the month. Next [facility] infusion on 9/5 and she discussed holding it for up to 2 weeks. Demi is seeking hold orders for this. Any questions reach ot to Demi at [phone].      Action Taken: Other: cs gi    Travel Screening: Not Applicable

## 2023-08-28 NOTE — TELEPHONE ENCOUNTER
Writer received a message from KP WATSON to help get Pt scheduled for a follow up with Dr. Jovita Fuller. Writer called and left message, along with call back number for the Pt.

## 2023-08-28 NOTE — GROUP NOTE
"Group Therapy Documentation    PATIENT'S NAME: Abiola Matute  MRN:   [id number]  :   1965  ACCT. NUMBER: [id number]  DATE OF SERVICE: 23  START TIME: 12:00 PM  END TIME:  2:00 PM  FACILITATOR(S): Judi Dodd LADC  TOPIC: BEH Group Therapy  Number of patients attending the group:  6  Group Length:  2 Hours    Group Therapy Type: Addiction    Summary of Group / Topics Discussed:    Balanced Lifestyle , Communication, Forgiveness, Grief & Loss, Relationships, Resentments, and Self-Care Activities      We checked in on logistics and any updates from last week.  Today's reading from \"Until Today\" by Rober Carter, focused on honoring self and honoring feelings. We discussed feelings as sensors and how it is important to emotional development to not mood alter.  I encouraged clients to discuss how \"keeping the channels clear\" can be helpful to a better quality of life and better coping when difficulty arises. We discussed how honoring our voice, feelings and choices is important to quality of life, and how this then, is connected to sobriety.  Clients also checked in on all dimensions, giving and receiving feedback.  Once client will be having surgery tomorrow, so we discussed her feelings and group spent time wishing her well.    Attestation: Dr. De La Rosa - Provides oversight and supervision of care.     Group Attendance:  Attended group session    Patient's response to the group topic/interactions:  cooperative with task, expressed readiness to alter behaviors, and expressed understanding of topic    Patient appeared to be Actively participating and Attentive.        Client specific details: [place] will be having her surgery tomorrow.  She discussed \"anxiety mireya high, yet I am hopeful\"  She said she has done all she can with her health and not drinking and preparing herself mentally.  She said she was glad she had group to attend and gets \"immense support\"  Today's session focussed on check in of " "all dimensions and  dim 2 medical, dim 3 honoring self and feelings, dim 5 healthy coping for warning signs and triggers.    Client participated in discussion of honoring feelings and self:  named 2 feelings, using handout from a few weeks ago: cautiously optimistic and hopeful  I encouraged client to take a breath when asked \"how are you\" and decide on standard answers like fine, or if maybe more appropriate and helpful to give more detail.  I suggested there is choice in response.    Dim1: ASHISH: no change reported   Dim2: medical issues or appts: surgery tomorrow  Dim 3: \"no thoughts of self harm or concerns\"  on likert scale 1-low and 10 -high:   Anxiety:  7  stress: 1 depression 2  issues to discuss and helpful coping:see above  Dim 4 on likert scale 1-low and 10 -high: 10   motivation for sobriety  Dim 5: cravings, warning signs, triggers: none  Dim 6 sober support motivation:  5    P)  Surgery tomorrow.  Stay busy enough to deal with anxieyt, yet take time fo feel some of it.  Return to group after surgery recovery, likely 9/6    Attestation:  Dr. De La Rosa - Provides oversight and supervision of care.  Judi Dodd, MS, LADC, LPC        "

## 2023-08-29 ENCOUNTER — ANESTHESIA (OUTPATIENT)
Dept: ANESTHESIOLOGY | Facility: CLINIC | Age: 58
End: 2023-08-29
Payer: COMMERCIAL

## 2023-08-29 ENCOUNTER — HOSPITAL ENCOUNTER (OUTPATIENT)
Facility: CLINIC | Age: 58
Discharge: HOME | End: 2023-08-29
Attending: SURGERY | Admitting: SURGERY
Payer: COMMERCIAL

## 2023-08-29 VITALS
HEIGHT: 66 IN | DIASTOLIC BLOOD PRESSURE: 87 MMHG | RESPIRATION RATE: 16 BRPM | SYSTOLIC BLOOD PRESSURE: 156 MMHG | HEART RATE: 71 BPM | BODY MASS INDEX: 29.58 KG/M2 | WEIGHT: 184.08 LBS | OXYGEN SATURATION: 97 % | TEMPERATURE: 98.2 F

## 2023-08-29 DIAGNOSIS — C22.0 HCC (HEPATOCELLULAR CARCINOMA) (H): Primary | ICD-10-CM

## 2023-08-29 PROCEDURE — 250N000011 HC RX IP 250 OP 636: Mod: JZ

## 2023-08-29 PROCEDURE — 76940 US GUIDE TISSUE ABLATION: CPT | Mod: 26 | Performed by: SURGERY

## 2023-08-29 PROCEDURE — 250N000011 HC RX IP 250 OP 636: Performed by: STUDENT IN AN ORGANIZED HEALTH CARE EDUCATION/TRAINING PROGRAM

## 2023-08-29 PROCEDURE — 250N000011 HC RX IP 250 OP 636: Mod: JZ | Performed by: ANESTHESIOLOGY

## 2023-08-29 PROCEDURE — 250N000011 HC RX IP 250 OP 636: Performed by: ANESTHESIOLOGY

## 2023-08-29 PROCEDURE — 360N000078 HC SURGERY LEVEL 5, PER MIN: Performed by: SURGERY

## 2023-08-29 PROCEDURE — 250N000011 HC RX IP 250 OP 636

## 2023-08-29 PROCEDURE — 250N000009 HC RX 250

## 2023-08-29 PROCEDURE — 250N000011 HC RX IP 250 OP 636: Mod: JZ | Performed by: STUDENT IN AN ORGANIZED HEALTH CARE EDUCATION/TRAINING PROGRAM

## 2023-08-29 PROCEDURE — 250N000009 HC RX 250: Performed by: ANESTHESIOLOGY

## 2023-08-29 PROCEDURE — 258N000003 HC RX IP 258 OP 636: Performed by: ANESTHESIOLOGY

## 2023-08-29 PROCEDURE — 999N000141 HC STATISTIC PRE-PROCEDURE NURSING ASSESSMENT: Performed by: SURGERY

## 2023-08-29 PROCEDURE — 272N000001 HC OR GENERAL SUPPLY STERILE: Performed by: SURGERY

## 2023-08-29 PROCEDURE — 250N000013 HC RX MED GY IP 250 OP 250 PS 637: Performed by: ANESTHESIOLOGY

## 2023-08-29 PROCEDURE — 710N000012 HC RECOVERY PHASE 2, PER MINUTE: Performed by: SURGERY

## 2023-08-29 PROCEDURE — 250N000025 HC SEVOFLURANE, PER MIN: Performed by: SURGERY

## 2023-08-29 PROCEDURE — 47370 LAPARO ABLATE LIVER TUMOR RF: CPT | Mod: 22 | Performed by: SURGERY

## 2023-08-29 PROCEDURE — 710N000010 HC RECOVERY PHASE 1, LEVEL 2, PER MIN: Performed by: SURGERY

## 2023-08-29 PROCEDURE — C1888 ENDOVAS NON-CARDIAC ABL CATH: HCPCS | Performed by: SURGERY

## 2023-08-29 PROCEDURE — 370N000017 HC ANESTHESIA TECHNICAL FEE, PER MIN: Performed by: SURGERY

## 2023-08-29 RX ORDER — FENTANYL CITRATE 50 UG/ML
25 INJECTION, SOLUTION INTRAMUSCULAR; INTRAVENOUS EVERY 5 MIN PRN
Status: DISCONTINUED | OUTPATIENT
Start: 2023-08-29 | End: 2023-08-29 | Stop reason: HOSPADM

## 2023-08-29 RX ORDER — OXYCODONE HYDROCHLORIDE 10 MG/1
10 TABLET ORAL
Status: COMPLETED | OUTPATIENT
Start: 2023-08-29 | End: 2023-08-29

## 2023-08-29 RX ORDER — ONDANSETRON 4 MG/1
4 TABLET, ORALLY DISINTEGRATING ORAL EVERY 30 MIN PRN
Status: DISCONTINUED | OUTPATIENT
Start: 2023-08-29 | End: 2023-08-30 | Stop reason: HOSPADM

## 2023-08-29 RX ORDER — DEXMEDETOMIDINE HYDROCHLORIDE 4 UG/ML
INJECTION, SOLUTION INTRAVENOUS
Status: COMPLETED | OUTPATIENT
Start: 2023-08-29 | End: 2023-08-29

## 2023-08-29 RX ORDER — OXYCODONE HYDROCHLORIDE 10 MG/1
10 TABLET ORAL
Status: DISCONTINUED | OUTPATIENT
Start: 2023-08-29 | End: 2023-08-30 | Stop reason: HOSPADM

## 2023-08-29 RX ORDER — ONDANSETRON 4 MG/1
4 TABLET, ORALLY DISINTEGRATING ORAL
Status: DISCONTINUED | OUTPATIENT
Start: 2023-08-29 | End: 2023-08-30 | Stop reason: HOSPADM

## 2023-08-29 RX ORDER — NALOXONE HYDROCHLORIDE 0.4 MG/ML
0.2 INJECTION, SOLUTION INTRAMUSCULAR; INTRAVENOUS; SUBCUTANEOUS
Status: DISCONTINUED | OUTPATIENT
Start: 2023-08-29 | End: 2023-08-29 | Stop reason: HOSPADM

## 2023-08-29 RX ORDER — DEXAMETHASONE SODIUM PHOSPHATE 10 MG/ML
INJECTION, SOLUTION INTRAMUSCULAR; INTRAVENOUS
Status: COMPLETED | OUTPATIENT
Start: 2023-08-29 | End: 2023-08-29

## 2023-08-29 RX ORDER — FENTANYL CITRATE 50 UG/ML
25-50 INJECTION, SOLUTION INTRAMUSCULAR; INTRAVENOUS
Status: DISCONTINUED | OUTPATIENT
Start: 2023-08-29 | End: 2023-08-29 | Stop reason: HOSPADM

## 2023-08-29 RX ORDER — METHOCARBAMOL 500 MG/1
500 TABLET, FILM COATED ORAL 4 TIMES DAILY PRN
Qty: 30 TABLET | Refills: 0 | OUTPATIENT
Start: 2023-08-29 | End: 2024-08-08

## 2023-08-29 RX ORDER — FLUMAZENIL 0.1 MG/ML
0.2 INJECTION, SOLUTION INTRAVENOUS
Status: DISCONTINUED | OUTPATIENT
Start: 2023-08-29 | End: 2023-08-29 | Stop reason: HOSPADM

## 2023-08-29 RX ORDER — LIDOCAINE HYDROCHLORIDE 20 MG/ML
INJECTION, SOLUTION INFILTRATION; PERINEURAL PRN
Status: DISCONTINUED | OUTPATIENT
Start: 2023-08-29 | End: 2023-08-29

## 2023-08-29 RX ORDER — AMOXICILLIN 250 MG
1-2 CAPSULE ORAL 2 TIMES DAILY
Qty: 30 TABLET | Refills: 0 | Status: SHIPPED | OUTPATIENT
Start: 2023-08-29 | End: 2024-02-05

## 2023-08-29 RX ORDER — ONDANSETRON 4 MG/1
4 TABLET, ORALLY DISINTEGRATING ORAL EVERY 30 MIN PRN
Status: DISCONTINUED | OUTPATIENT
Start: 2023-08-29 | End: 2023-08-29 | Stop reason: HOSPADM

## 2023-08-29 RX ORDER — OXYCODONE HYDROCHLORIDE 5 MG/1
5-10 TABLET ORAL EVERY 4 HOURS PRN
Qty: 30 TABLET | Refills: 0 | Status: SHIPPED | OUTPATIENT
Start: 2023-08-29 | End: 2023-08-29

## 2023-08-29 RX ORDER — CEFAZOLIN SODIUM/WATER 2 G/20 ML
2 SYRINGE (ML) INTRAVENOUS SEE ADMIN INSTRUCTIONS
Status: DISCONTINUED | OUTPATIENT
Start: 2023-08-29 | End: 2023-08-29 | Stop reason: HOSPADM

## 2023-08-29 RX ORDER — HYDROMORPHONE HCL IN WATER/PF 6 MG/30 ML
0.4 PATIENT CONTROLLED ANALGESIA SYRINGE INTRAVENOUS EVERY 5 MIN PRN
Status: DISCONTINUED | OUTPATIENT
Start: 2023-08-29 | End: 2023-08-29 | Stop reason: HOSPADM

## 2023-08-29 RX ORDER — OXYCODONE HYDROCHLORIDE 5 MG/1
5 TABLET ORAL
Status: DISCONTINUED | OUTPATIENT
Start: 2023-08-29 | End: 2023-08-30 | Stop reason: HOSPADM

## 2023-08-29 RX ORDER — ONDANSETRON 2 MG/ML
4 INJECTION INTRAMUSCULAR; INTRAVENOUS EVERY 30 MIN PRN
Status: DISCONTINUED | OUTPATIENT
Start: 2023-08-29 | End: 2023-08-30 | Stop reason: HOSPADM

## 2023-08-29 RX ORDER — CEFAZOLIN SODIUM/WATER 2 G/20 ML
2 SYRINGE (ML) INTRAVENOUS
Status: COMPLETED | OUTPATIENT
Start: 2023-08-29 | End: 2023-08-29

## 2023-08-29 RX ORDER — SODIUM CHLORIDE, SODIUM LACTATE, POTASSIUM CHLORIDE, CALCIUM CHLORIDE 600; 310; 30; 20 MG/100ML; MG/100ML; MG/100ML; MG/100ML
INJECTION, SOLUTION INTRAVENOUS CONTINUOUS
Status: DISCONTINUED | OUTPATIENT
Start: 2023-08-29 | End: 2023-08-29 | Stop reason: HOSPADM

## 2023-08-29 RX ORDER — ENOXAPARIN SODIUM 100 MG/ML
40 INJECTION SUBCUTANEOUS
Status: COMPLETED | OUTPATIENT
Start: 2023-08-29 | End: 2023-08-29

## 2023-08-29 RX ORDER — NALOXONE HYDROCHLORIDE 0.4 MG/ML
0.4 INJECTION, SOLUTION INTRAMUSCULAR; INTRAVENOUS; SUBCUTANEOUS
Status: DISCONTINUED | OUTPATIENT
Start: 2023-08-29 | End: 2023-08-29 | Stop reason: HOSPADM

## 2023-08-29 RX ORDER — ONDANSETRON 2 MG/ML
INJECTION INTRAMUSCULAR; INTRAVENOUS PRN
Status: DISCONTINUED | OUTPATIENT
Start: 2023-08-29 | End: 2023-08-29

## 2023-08-29 RX ORDER — FENTANYL CITRATE 50 UG/ML
INJECTION, SOLUTION INTRAMUSCULAR; INTRAVENOUS PRN
Status: DISCONTINUED | OUTPATIENT
Start: 2023-08-29 | End: 2023-08-29

## 2023-08-29 RX ORDER — SODIUM CHLORIDE, SODIUM GLUCONATE, SODIUM ACETATE, POTASSIUM CHLORIDE AND MAGNESIUM CHLORIDE 526; 502; 368; 37; 30 MG/100ML; MG/100ML; MG/100ML; MG/100ML; MG/100ML
INJECTION, SOLUTION INTRAVENOUS CONTINUOUS PRN
Status: DISCONTINUED | OUTPATIENT
Start: 2023-08-29 | End: 2023-08-29

## 2023-08-29 RX ORDER — SODIUM CHLORIDE, SODIUM LACTATE, POTASSIUM CHLORIDE, CALCIUM CHLORIDE 600; 310; 30; 20 MG/100ML; MG/100ML; MG/100ML; MG/100ML
INJECTION, SOLUTION INTRAVENOUS CONTINUOUS PRN
Status: DISCONTINUED | OUTPATIENT
Start: 2023-08-29 | End: 2023-08-29

## 2023-08-29 RX ORDER — FENTANYL CITRATE 50 UG/ML
50 INJECTION, SOLUTION INTRAMUSCULAR; INTRAVENOUS EVERY 5 MIN PRN
Status: DISCONTINUED | OUTPATIENT
Start: 2023-08-29 | End: 2023-08-29 | Stop reason: HOSPADM

## 2023-08-29 RX ORDER — PROPOFOL 10 MG/ML
INJECTION, EMULSION INTRAVENOUS PRN
Status: DISCONTINUED | OUTPATIENT
Start: 2023-08-29 | End: 2023-08-29

## 2023-08-29 RX ORDER — HYDROMORPHONE HCL IN WATER/PF 6 MG/30 ML
0.2 PATIENT CONTROLLED ANALGESIA SYRINGE INTRAVENOUS EVERY 5 MIN PRN
Status: DISCONTINUED | OUTPATIENT
Start: 2023-08-29 | End: 2023-08-29 | Stop reason: HOSPADM

## 2023-08-29 RX ORDER — ACETAMINOPHEN 325 MG/1
650 TABLET ORAL
Status: DISCONTINUED | OUTPATIENT
Start: 2023-08-29 | End: 2023-08-30 | Stop reason: HOSPADM

## 2023-08-29 RX ORDER — ONDANSETRON 2 MG/ML
4 INJECTION INTRAMUSCULAR; INTRAVENOUS EVERY 30 MIN PRN
Status: DISCONTINUED | OUTPATIENT
Start: 2023-08-29 | End: 2023-08-29 | Stop reason: HOSPADM

## 2023-08-29 RX ORDER — OXYCODONE HYDROCHLORIDE 5 MG/1
5-10 TABLET ORAL EVERY 4 HOURS PRN
Qty: 30 TABLET | Refills: 0 | Status: SHIPPED | OUTPATIENT
Start: 2023-08-29 | End: 2023-09-15

## 2023-08-29 RX ORDER — BUPIVACAINE HYDROCHLORIDE 2.5 MG/ML
INJECTION, SOLUTION EPIDURAL; INFILTRATION; INTRACAUDAL
Status: COMPLETED | OUTPATIENT
Start: 2023-08-29 | End: 2023-08-29

## 2023-08-29 RX ADMIN — ROCURONIUM BROMIDE 50 MG: 10 INJECTION INTRAVENOUS at 16:11

## 2023-08-29 RX ADMIN — ROCURONIUM BROMIDE 30 MG: 10 INJECTION INTRAVENOUS at 17:40

## 2023-08-29 RX ADMIN — FENTANYL CITRATE 25 MCG: 50 INJECTION, SOLUTION INTRAMUSCULAR; INTRAVENOUS at 19:19

## 2023-08-29 RX ADMIN — OXYCODONE HYDROCHLORIDE 10 MG: 10 TABLET ORAL at 20:43

## 2023-08-29 RX ADMIN — SODIUM CHLORIDE, POTASSIUM CHLORIDE, SODIUM LACTATE AND CALCIUM CHLORIDE: 600; 310; 30; 20 INJECTION, SOLUTION INTRAVENOUS at 16:00

## 2023-08-29 RX ADMIN — PHENYLEPHRINE HYDROCHLORIDE 100 MCG: 10 INJECTION INTRAVENOUS at 17:01

## 2023-08-29 RX ADMIN — SUGAMMADEX 200 MG: 100 INJECTION, SOLUTION INTRAVENOUS at 18:27

## 2023-08-29 RX ADMIN — DEXAMETHASONE SODIUM PHOSPHATE 2 MG: 10 INJECTION, SOLUTION INTRAMUSCULAR; INTRAVENOUS at 13:30

## 2023-08-29 RX ADMIN — DEXMEDETOMIDINE 40 MCG: 100 INJECTION, SOLUTION, CONCENTRATE INTRAVENOUS at 13:30

## 2023-08-29 RX ADMIN — FENTANYL CITRATE 50 MCG: 50 INJECTION, SOLUTION INTRAMUSCULAR; INTRAVENOUS at 19:03

## 2023-08-29 RX ADMIN — LIDOCAINE HYDROCHLORIDE 100 MG: 20 INJECTION, SOLUTION INFILTRATION; PERINEURAL at 16:10

## 2023-08-29 RX ADMIN — SODIUM CHLORIDE, SODIUM GLUCONATE, SODIUM ACETATE, POTASSIUM CHLORIDE AND MAGNESIUM CHLORIDE: 526; 502; 368; 37; 30 INJECTION, SOLUTION INTRAVENOUS at 18:26

## 2023-08-29 RX ADMIN — ROCURONIUM BROMIDE 20 MG: 10 INJECTION INTRAVENOUS at 16:40

## 2023-08-29 RX ADMIN — BUPIVACAINE HYDROCHLORIDE 40 ML: 2.5 INJECTION, SOLUTION EPIDURAL; INFILTRATION; INTRACAUDAL; PERINEURAL at 13:30

## 2023-08-29 RX ADMIN — SODIUM CHLORIDE, POTASSIUM CHLORIDE, SODIUM LACTATE AND CALCIUM CHLORIDE: 600; 310; 30; 20 INJECTION, SOLUTION INTRAVENOUS at 16:49

## 2023-08-29 RX ADMIN — FENTANYL CITRATE 50 MCG: 50 INJECTION, SOLUTION INTRAMUSCULAR; INTRAVENOUS at 16:42

## 2023-08-29 RX ADMIN — PHENYLEPHRINE HYDROCHLORIDE 200 MCG: 10 INJECTION INTRAVENOUS at 17:45

## 2023-08-29 RX ADMIN — Medication 2 G: at 16:15

## 2023-08-29 RX ADMIN — ONDANSETRON 4 MG: 4 TABLET, ORALLY DISINTEGRATING ORAL at 20:51

## 2023-08-29 RX ADMIN — FENTANYL CITRATE 25 MCG: 50 INJECTION, SOLUTION INTRAMUSCULAR; INTRAVENOUS at 19:12

## 2023-08-29 RX ADMIN — HYDROMORPHONE HYDROCHLORIDE 0.5 MG: 1 INJECTION, SOLUTION INTRAMUSCULAR; INTRAVENOUS; SUBCUTANEOUS at 18:04

## 2023-08-29 RX ADMIN — PROPOFOL 100 MG: 10 INJECTION, EMULSION INTRAVENOUS at 16:10

## 2023-08-29 RX ADMIN — FENTANYL CITRATE 50 MCG: 50 INJECTION, SOLUTION INTRAMUSCULAR; INTRAVENOUS at 16:10

## 2023-08-29 RX ADMIN — ONDANSETRON 4 MG: 2 INJECTION INTRAMUSCULAR; INTRAVENOUS at 18:15

## 2023-08-29 RX ADMIN — FENTANYL CITRATE 50 MCG: 50 INJECTION, SOLUTION INTRAMUSCULAR; INTRAVENOUS at 13:21

## 2023-08-29 RX ADMIN — ENOXAPARIN SODIUM 40 MG: 40 INJECTION SUBCUTANEOUS at 15:11

## 2023-08-29 ASSESSMENT — ACTIVITIES OF DAILY LIVING (ADL)
ADLS_ACUITY_SCORE: 35

## 2023-08-29 NOTE — BRIEF OP NOTE
[facility]    Brief Operative Note    Pre-operative diagnosis: HCC (hepatocellular carcinoma) (H) [C22.0]  Post-operative diagnosis Same as pre-operative diagnosis    Procedure: Procedure(s):  Diagnostic Laparoscopy, Laparoscopic microwave ablation of liver tumor intraoperative ultrasound of liver, lysis of adhesions 1 hour,  Surgeon: Surgeon(s) and Role:     * Albino Saavedra MD - Primary     * Horacio Mancia MD - Resident - Assisting     * Ezio Camarena MD  Anesthesia: General with Block   Estimated Blood Loss: Less than 50 ml    Drains: None  Specimens: * No specimens in log *  Findings:   Cirrhotic liver, 2 cm partially exophytic tumor visible on segment VII, dense adhesions between right liver and retroperitoneum. .  Complications: None.  Implants: * No implants in log *

## 2023-08-29 NOTE — ANESTHESIA PROCEDURE NOTES
TAP Procedure Note    Pre-Procedure   Staff -        Anesthesiologist:  Kami Fink MD       Resident/Fellow: Noah Mitchell Jr., MD       Other Anesthesia Staff: Johan Pryor MD       Performed By: resident, with residents and with fellow       Procedure performed by resident/fellow/CRNA in presence of a teaching physician.         Location: pre-op       Procedure Start/Stop Times: 8/29/2023 1:25 PM and 8/29/2023 1:35 PM       Pre-Anesthestic Checklist: patient identified, IV checked, site marked, risks and benefits discussed, informed consent, monitors and equipment checked, pre-op evaluation, at physician/surgeon's request and post-op pain management  Timeout:       Correct Patient: Yes        Correct Procedure: Yes        Correct Site: Yes        Correct Position: Yes        Correct Laterality: Yes        Site Marked: Yes  Procedure Documentation  Procedure: TAP       Diagnosis: POST OPERATIVE PAIN       Laterality: bilateral       Patient Position: supine       Patient Prep/Sterile Barriers: sterile gloves, mask       Skin prep: Chloraprep       Needle Type: short bevel       Needle Gauge: 21.        Needle Length (millimeters): 110        Ultrasound guided       1. Ultrasound was used to identify targeted nerve, plexus, vascular marker, or fascial plane and place a needle adjacent to it in real-time.       2. Ultrasound was used to visualize the spread of anesthetic in close proximity to the above referenced structure.       3. A permanent image is entered into the patient's record.    Assessment/Narrative         The placement was negative for: blood aspirated, painful injection and site bleeding       Paresthesias: No.       Bolus given via needle..        Secured via.        Insertion/Infusion Method: Single Shot       Complications: none       Injection made incrementally with aspirations every 5 mL.    Medication(s) Administered   Bupivacaine 0.25% PF (Infiltration) - Infiltration   40 mL  "- 8/29/2023 1:30:00 PM  Dexmedetomidine 4 mcg/mL (Perineural) - Perineural   40 mcg - 8/29/2023 1:30:00 PM  Dexamethasone 10 mg/mL PF (Perineural) - Perineural   2 mg - 8/29/2023 1:30:00 PM  Medication Administration Time: 8/29/2023 1:25 PM     Comments:  Discussed risks of nerve block, including nerve injury, bleeding, infection. Discussed anticipated incomplete analgesia. Discussed alternative of not performing a nerve block. Ensured understanding, invited questions and all questions were answered. Patient wishes to proceed.    Informed consent was obtained.   Patient tolerated well. Incremental aspiration every 5 mL. No paresthesia, no heme. Needle tip visualized throughout with appropriate spread of local anesthetic in fascial planes bilaterally.   Block was placed at the surgeon's request for post operative pain control.          FOR [facility] ONLY:   Pain Team Contact information: please page the Pain Team Via [employer]. Search \"Pain\". During daytime hours, please page the attending first. At night please page the resident first.      "

## 2023-08-29 NOTE — PROGRESS NOTES
[facility] Weekly Treatment Plan Review      Attestation:  Dr Kami De La Rosa. - Provides oversight and supervision of care.        Date Span: Monday: 23 through Fuad 09/3/23     Date     Group Therapy 2 hours Completed these sessions 0 hours  0 hours 0 hours       Individual Therapy                Family Therapy                 Psychoeducation                 Other (Specify)                  Clients individual sessions noted below. Client had 1 excused absence this week, due to having surgery.    Client has no absences this week    Client has one excused absence this week , due to medical appts.  Client excused from group , due to medical appointment.     Program Running Totals: (No Phase 1 IOP due to this program being only OP level of care)  Total # of Phase 2 OP Group Sessions: 27 for 54  Virginia gets 30 sessions for 60 hours at this time.  She is at 34  Counting her individual she has used 30  sessions and 53 hours  Total # of Phase 3 OP Recovery Management Group Sessions: None    Total # of 1:1 Sessions:  1 hour BALWINDER   1 hour 5/30  7/3,                                     Darinel , , ( & -Onelia)                    Weekly Treatment Plan Review     Treatment Plan initiated on: .    Dimension1: Acute Intoxication/Withdrawal Potential -   Previous Dimension Ratin  Current Dimension Ratin  Date of Last Use 22  Any reports of withdrawal symptoms - No      Dimension 2: Biomedical Conditions & Complications -   Previous Dimension Ratin  Current Dimension Ratin    Medical Concerns:Surgery  for her cancer  / Reports having no new or worsening health conditions. Reports having an appointment this week on 2023 with her Cardiologist.     wk of  No concerns I did  my pre op eval for my upcoming surgery with Dr. Bear and nurse Sakina Glass  wk of  No new concerns  wk of :  " no, just good news that they may be able to remove all my tumore and I may not need a liver.  WK of 7/24-Client has an appointment with surgeon regarding increase in tumor. She is excused from programming due time conflict.  wk of 7/17 She had appt with radiology on 7/14, continues to work with her team due to LT, cancer and rash.  Virginia told group she is dealing with some news, medically, but working with her Drs.  She said she isn't wanting to share a whole lot, until she gethers her information, but she did want group to know she has support and appreciates just being able to share what she did  wk 7/10Shlauren reports rash and working with , also had CT and MRI related to ongoing care for cancer and need for liver.  wk of 7/3  I get an infusion 1x per month and usually by week 3, I feel more sore and more tired, but I am grateful for the infusions.  \"no concerns beyond ongoing liver cancer and Crohns\"  wk of 6/12 She saw Jazmin Barrios at Urgent care for skin issues.  She also said she met with her GI practioner  She reports having liver cancer and Crohn's disease.  \"right now I am doing pretty well  Outside medical appointments this week (list provider and reason for visit) 8/29 Surgery with care team for cancer  wk of 8/14 No concerns I have my pre op eval with Dr. Glass, for my upcoming surgery   Wk of 8/7 No appts this week   wk of 7/31: no appts.  wk of 7/24 she saw surgeon Junior Bonilla, she is dealing with her liver tumor  wk of 7/17 She had appt with radiology, Dr. Shah,on 7/14, continues to work with her team due to LT, cancer and rash  : wk of 7/6 she saw Susanna RAZA for rash on leg.  Had a PeTH test 7/7, see below. No issues  : 6/13 she saw Jazmin Meng PA-C for a flare up of her psoriasis likely related to her other medical issues, per client     Current Medications & Medication Changes:  No current facility-administered medications for this encounter.     No current outpatient " medications on file.     Facility-Administered Medications Ordered in Other Encounters   Medication    BREEZA Lemon-Lime flavored oral liquid for Neutral Abdominal/Pelvic Imaging 1,000 mL    bupivacaine (MARCAINE) PF 0.25% + dexamethasone (DECADRON) PF 1 mg + dexmedetomidine (PRECEDEX) 20 mcg injection    ceFAZolin Sodium (ANCEF) injection 2 g    ceFAZolin Sodium (ANCEF) injection 2 g    fentaNYL (PF) (SUBLIMAZE) injection 25-50 mcg    flumazenil (ROMAZICON) injection 0.2 mg    hyoscyamine (LEVSIN/SL) sublingual tablet 125 mcg    midazolam (VERSED) injection 1-2 mg    naloxone (NARCAN) injection 0.2 mg    Or    naloxone (NARCAN) injection 0.4 mg    Or    naloxone (NARCAN) injection 0.2 mg    Or    naloxone (NARCAN) injection 0.4 mg     Medication Prescriber:  Linda Macdonald  Taking meds as prescribed? Yes  Medication side effects or concerns:  no        Dimension 3: Emotional/Behavioral Conditions & Complications -   Previous Dimension Ratin  Current Dimension Ratin  PHQ2:       2023     5:00 PM 2023     8:00 AM 2023     1:34 PM 2023    10:00 AM 3/28/2023    11:21 AM 3/26/2023     1:45 PM 2023     9:35 AM   PHQ-2 (  [employer])   Q1: Little interest or pleasure in doing things 0 0    0 0 0 0 0 0   Q2: Feeling down, depressed or hopeless 0 0    0 0 0 0 0 1   PHQ-2 Score 0 0    0 0 0 0 0 1   Q1: Little interest or pleasure in doing things  Not at all   Not at all  Not at all   Q2: Feeling down, depressed or hopeless  Not at all   Not at all  Several days   PHQ-2 Score  0   0  1      GAD2:       2023     9:54 AM 2023    10:00 AM 2023     8:00 AM 2023     5:00 PM   TAINA-2   Feeling nervous, anxious, or on edge 1 1 0    0    0    0    0 1   Not being able to stop or control worrying 1 0 0    0    0    0    0 0   TAINA-2 Total Score 2 1 0    0    0    0    0 1     PROMIS 10-Global Health (all questions and answers displayed):       2023     9:58 AM 2023     7:32  PM 5/25/2023    10:00 AM 7/19/2023     8:02 AM 8/16/2023     5:00 PM   PROMIS 10   In general, would you say your health is: Good Good  Good    In general, would you say your quality of life is: Good Good  Very good    In general, how would you rate your physical health? Fair Fair  Fair    In general, how would you rate your mental health, including your mood and your ability to think? Good Good  Very good    In general, how would you rate your satisfaction with your social activities and relationships? Good Good  Excellent    In general, please rate how well you carry out your usual social activities and roles Good Good  Very good    To what extent are you able to carry out your everyday physical activities such as walking, climbing stairs, carrying groceries, or moving a chair? Completely Completely  Mostly    In the past 7 days, how often have you been bothered by emotional problems such as feeling anxious, depressed, or irritable? Sometimes Rarely  Rarely    In the past 7 days, how would you rate your fatigue on average? Mild Mild  Mild    In the past 7 days, how would you rate your pain on average, where 0 means no pain, and 10 means worst imaginable pain? 3 3  3    In general, would you say your health is: 3 3 3 3    3    3    3    3 3   In general, would you say your quality of life is: 3 3 4 4    4    4    4    4 3   In general, how would you rate your physical health? 2 2 3 2    2    2    2    2 3   In general, how would you rate your mental health, including your mood and your ability to think? 3 3 4 4    4    4    4    4 4   In general, how would you rate your satisfaction with your social activities and relationships? 3 3 5 5    5    5    5    5 4   In general, please rate how well you carry out your usual social activities and roles. (This includes activities at home, at work and in your community, and responsibilities as a parent, child, spouse, employee, friend, etc.) 3 3 5 4    4    4    4    4 4  "  To what extent are you able to carry out your everyday physical activities such as walking, climbing stairs, carrying groceries, or moving a chair? 5 5 4 4    4    4    4    4 3   In the past 7 days, how often have you been bothered by emotional problems such as feeling anxious, depressed, or irritable? 3 2 2 2    2    2    2    2 2   In the past 7 days, how would you rate your fatigue on average? 2 2 2 2    2    2    2    2 2   In the past 7 days, how would you rate your pain on average, where 0 means no pain, and 10 means worst imaginable pain? 3 3 4 3    3    3    3    3 2   Global Mental Health Score 12 13 17 17    17    17    17    17 15   Global Physical Health Score 15 15 14 14    14    14    14    14 14   PROMIS TOTAL - SUBSCORES 27 28 31 31    31    31    31    31 29     Mental health diagnosis none  Date of last SIB:  never  Date of  last SI:  never  Date of last HI: never  Behavioral Targets:  see tx plan  Risk factors:  Client dealing with liver disease/cancer and Crohns, client needs a new liver,   Protective factors:  spirituality, forward/future oriented thinking, safe and stable environment, regular physical activity, living with other people and structured day  Current MH Assignments:  see tx plan      Narrative: wk of 8/28, RR changed to 0 from 1.  Virginia continues to report healthy coping for anxiety symptoms.  She remains optimistic and finds using tools learned are helpful, as is discussion with supportive people.  : \"no thoughts of self harm or concerns\"  on likert scale 1-low and 10 -high:   Anxiety:  7  stress: 1 depression 2     Virginia will be having her surgery tomorrow.  She discussed \"anxiety mireya high, yet I am hopeful\"  She said she has done all she can with her health and not drinking and preparing herself mentally.  She said she was glad she had group to attend and gets \"immense support\"  wk of 8/21/2023 She reports her anxiety is down and she experiencing no stress or depression. Client " "worked on her Dimension Three goals this week as she learned about and discussed the topics of Shame, Stigma and Mindfulness.     wkof 8/14  Ways I can think differently: listen to my critical voice and change my thoughts  Ways I can do things differently:  talk to myself like I would a friend.  \"no thoughts of self harm or concerns\"  on likert scale 1-low and 10 -high:   Anxiety:  0  stress: 0 depression 0  issues to discuss and helpful coping:  doing fun things, attending this group, being sober  wk of 8/7 ratings on likert scale 1-low and 10 -high: Anxiety: 2 stress: 1 depression 2. She participated in discussion on \"change the channel\" when getting into an anxiety loop. \"It is a simple way to do something more productive. Virginia actively participated in discussion on Thought distortions and discussed which ones are the ones she actively wants to focus on for the time being: catastrophising and prediction.  She discussed  two situations where the techniques could be helpful, including: Should's and musts and not setting up unrealistic expectations of how she or life should be.  I want to remind myself \"until someone has walked a mile in my shoes, they don't get to  me and the same goes for me with them.  Virginia actively participated in meditation and said it was good to take that time to be.  \"I liked it\"  wk of 7/31  she participated in psychoeducation on Understanding my anxiety   This topic helped client to gain awareness of thoughts and feelings associated with anxiety and act with awareness when feeling anxiety and use helpful coping.  Client did actively participate in group discussions, and was able  to identify anxious thoughts, physical feelings when having anxiety: racing heart, shaking, confusion  \"why me\"  She was able to  identify 2 helpful coping methods: breath, talk with her  who is a comfort, stop the story telling/negativity when aware.  Virginia discussed that her tumor is shrinking and she " "is \"cautiously optimistic\". She said she wanted to share that she has many feelings associated with this, \"it even could mean I won't need a LT, but that remains to be seen\"  I told Virginia she has been shown how with our spirituality and belief, sometimes things can happen that were never anticipated.  She said \"so true\" I never had an idea that was even a part of my hope\"      Wk of 7/24- client denies thoughts of self-harm. On Likert scall 1-low and 10-high client rates anxiety 1  depression 0 and stress 2. She shared her new diagnosis of tumor growth has impacts stress and anxiety.   wk of 7/17: no thoughts of self harm or concerns\" on likert scale 1-low and 10 -high:   Anxiety:  5  stress: 5 depression 1  issues to discuss and Helpful coping:  All related to my medical, but I have great medical team and family support.  Client said of the reading:I like the ongoing discussions of shame and this reading brought me awareness that I have some to deal with regarding the nature of why many get liver cancer.       wk of 7/10:  She was able to repeat the steps of 5 senses, as a review.   \"no thoughts of self harm or concerns\".  Clients feedback to group member  on likert scale 1-low and 10 -high:   Anxiety:  2  stress: 2  depression 2  issues to discuss and Helpful coping: talking with you guys.  I had a craving that was pretty big this week.  Psychoeducation/Skills The thinking-feeling connection. This topic will address information from the [employer] about how our thoughts influence our feelings.  Client was able to define automatic thoughts and make a connection with some that she has.   She said that she has been continueing to gain awareness of her beliefs vs perceptions vs what is a thought and what is a feeling.  Discussed that reminding herself it is ok to have thoughts and feelings, but they are not facts for other people.    Client participated in lovingkindness meditation and said it " "was just what she needed today and it that it was helpful and grounding.  \"Thank you for doing it\"    wk of 7/3: Clients participated in 5 senses psychoeducation discussion, participated in guided practice and was able to name 5 parts, and said she feels it will be a helpful tool  \"no thoughts of self harm or concerns\".   I discussed the research on gratitude journals and expressed she has seemed like she has an \"attitude of gratitude\".  She said she feels blessed and hopes she does.  on likert scale 1-low and 10 -high:   Anxiety:  0  stress: 0 depression 0  issues to discuss and Helpful coping: I am learning to say no, and that boundaries are good    wk of 6/26  Attended  skills group with Darinel Au.    She participated in psychoeducation on Core beliefsThis topic will discuss core beliefs, as a lens through which we see life and how that affects our choices.  Virginia said the discussion on core beliefs is something she will spend some time on, as the awareness is helpful.  I told her we will follow up on Wednesday with more that can be helpful when we notice harmful core beliefs.   \"no thoughts of self harm or concerns\"  on likert scale 1-low and 10 -high:   Anxiety:  1 stress: 1 depression 0  issues to discuss and Helpful coping: learning about self, anxiety and stress  wk of 6/19 \"no thoughts of self harm or no concerns\".  She reports on likert scale 1-low and 10 -high:   Anxiety:  1  stress: 1 depression 0  issues to discuss and Helpful coping: getting to know people better in group, hearing other's helpful coping.  Attended  skills group with Darinel Au.  We discussed anticipating some situations and \"gearing up\" by practicing deep breathing. I reminded client of previous discussions where beliefs often guide how we feel about certain situations, but that we can reframe them, or look at them for the particular situation.      Wk of 6/12  I am feeling much better and less anxiety about group " "attendance as I continue to attend and get to know others. \"no thoughts of self harm or concerns\". On likert scale 1-low and 10 -high:   Anxiety:  1  stress: 1 depression 1  issues to discuss and Helpful coping:  Breathing, getting to others in group  Wk of  \"no thoughts of self harm\"  Attended MH skills group with Darinel Malonebert  Week of 23 :Client reports feeling anxious due to first treatment experience, not knowing what to expect, her  Mother's declining health-needing a placement for higher level of care, spouse recently lost his job-need to obtain COBRA insurance and unable to care for grandchildren because of attending treatment. Client was saw Traci Aguilar and has not seen them in about 6 weeks. To follow up with primary counselor re: seeing psychotherapist or return to [facility].  Client rates the following on a scale of 1 (low) to 10 (high):  Anxiety- 5  Depression -1  Stress- 5 as a result of above concerns.   Client participated in psychotherapy/education on self-care, introduction to relaxation meditation-tensing and relaxing muscles, and sleep hygiene.  Current Mental Health symptoms include: . Active interventions to stabilize mental health symptoms this week :   At SI her PHQ-2 score was 0 and his TAINA-2 score was 1.  PROMISE 10 was  31:  She reports that at this time she is dealing with her mother's living situation and health issues. She reports that her father has passed away and that she is estranged from 1 sister.         Dimension 4: Treatment Acceptance / Resistance -   Previous Dimension Ratin  Current Dimension Ratin  RENEE Diagnosis:  Alcohol Use Disorder   303.90 (F10.20) Severe In a controlled environment  Commitment to tx process/Stage of change- contemplation  RENEE assignments - see tx plan      Narrative - wk of   on likert scale 1-low and 10 -high: 10   motivation for sobriety 10.  Motivated to continue a life of sobriety, \"I am learning so much about " "self.  wk of 8/21/2023 Client expressed this week about her alcoholism that that she liikes that \"I'm getting better not as shameful.\" That for her it has helped to accept alcoholism, \"occurs as a disease.\"     wk of 8/14  Virginia told group \"I know I am an alcoholic\" she said she is so glad to have had to find her way to the group, even if it was through health issues or she would still be doing damage.  motivation for sobriety; 10 she likes being sober and reports she likes learning and attending group.   wk of 8/7  motivation for sobriety 10.  Motivation is high to have quality of life  wk of 7/31  on likert scale 1-low and 10 -high:  motivation for sobriety: 10   WK of 7/24-motivation 10 on Likert scale.she expressed strong commitment to sobriety, see notes dated 7/25 for more details.  wk of 7/17:  on likert scale 1-low and 10 -high:  motivation for sobriety: 10+  wk of 7/10  My motivation for sobriety is 10/10\".  I think it is so important to attend and be dedicated to support in this group, and to \"say things outloud, so it takes the power away, as you say, Alana\"  wk of 7/3  on likert scale 1-low and 10 -high:  motivation for sobriety 10.  Virginia said Mondays session really stuck with her and she thought about a lot of things and is really understanding why attendance and building on the days and lessons is important.     wk of 6/26  sober support motivation:  2, but I think motivation will grow as I keep settling in and I am motivated 10/10 for sobreity.    Wk of 6/19:  Client reports motivation for sobriety is 10 (high) Motivation for attending community support 5 (on scale 1 low-10 high).   Virginia expressed to her group how helpful they are to her and how much she appreciates all that members shared about their stories.  She said she gains such helpful insight for self   Week of 6/12 Virginia discussed that she has been late a couple times, but realized with 2 other members gone, how this speaks to the importance " "of being on time and valuing and respecting others time, too.   She will also miss tomorrows group due to a long awaited medical appt. Dim 4: on likert scale 1-low and 10 -high:  Motivation for sobriety:  10  motivation for sober support attendance: not doing yet  Wk of   Virginia also said she finds motivation for sobriety in her family and friends, \"but really deep down need to do this for myself\"  Told counselor and group how she didn't think she would get much out of tx and already is getting so much and is grateful for older adult group   Week of 23: Client reports motivation for sobriety is 10 (high) Motivation for attending community support 5 (on scale 1 low-10 high).  The patient is motivated, to explore treatment.  She said \"of course I am partly here to fulfill requirements for liver transplant, but I want to learn healthy coping for lifelong sobriety.      Dimension 5: Relapse / Continued Problem Potential -   Previous Dimension Ratin  Current Dimension Ratin  Relapses this week - None  Urges to use - None  UA results -   No results found for this or any previous visit (from the past 168 hour(s)).    Using ReSet Clem: N/A    Narrative-  wk of   cravings, warning signs, triggers: none I went to a [employer] game and had no cravings.\"   Wk of 2023 She reports having no cravings to drink this week and having a \"wonderful week.\"       Wk of    lient discussed a big warning sign for self is thinking I can drink again, especially socially.  II think about  it regularly.  Group discussed that awareness of this is good and it is likely normal to do this.  I encouraged her to continue to discuss this and down the road to keep in mind with sponsor, therapist, etc. And work through it.  I told her it is really good she can say this outloud.   Something I am doing well: having plans of structure, especially when around situations I used to drink in     any cravings, warning signs, triggers: the " "Pink concert could have been but wasn't .  I have supportive people and that helps, but so does the realization of my use patterns and how I feel better not using  wk of 8/7 Virginia said that a trigger for her was going [place] the casino for a comedy show. \"I haven't been there since starting tx\"  My  and I just kept walking and he also said \"I got you\".  She said her cravings only lasted a couple minutes.  We discussed how this can be common and some group members said they cannot go to the same places at all because of that kind of trigger.  She said \"I get it, and will continue to be aware of situations like this   wk of 7/31   cravings, warning signs, triggers: None this week   WK of 7/24-client denies triggers, cravings or warning signs this past week.  wk of 7/17:  cravings, warning signs, triggers:i went to a musical festival, had a few cravings, but also had insight when watching others use, \"glad it isn't me\"  Wk of 7/10 Had a PeTH test 7/7, see above. No issues. She discussed the Serenity Prayer and that she finds it helpful.  She also discussed having cravings at a 4 of 10 when cooking, and what she found helpful:  Processed, talked with , talk with group today about it.   Virginia said she really had quite a learning week with events in group, discussions and assignments.  \"My heart goes out to the new person the other day, but also I had some anger about all the disruptions and negativity from her\"  \"I am ready to move on, and learn what I learned, and know that could be any one of us\"  wk of 7/5  cravings, warning signs, triggers: none.  My adult kids are caring and asked if I thought I would be OK going to [facility], as there is drinking there.  She told them she appreciates the concern, but feels with them and little to no triggers, for quite some time, she didn't feel it was a problem.  Virginia said she had a wonderful time with her family and is not concerned about use over 4th of July " "holiday either, she has a good plan for spending time with family and focusing on good food.   wk of 6/26  Dim 5: cravings, warning signs, triggers: none.  Wed reading was focused blame.  It said blaming is hiding, that when we blame others we try to hide our character defects.  We discussed the meaning of character defects. \"I was in big denial for a long time, it took being told I would need a new liver for me to really realize what drinking was doing. \"it hit me like a ton of bricks when I finally really heard it\"  wk of 6/19  She participated in  Psychoeducation/Skills: Self-Care.  Client s completed self-care assessment and described self-care as feeling better physically. Client identified physical self-care they do well- attending medical appointments, one they do poorly or needs improvement- eating healthy food and barriers that prevent themselves from self-care - cost of food is expensive, the shopping and prep.  This topic will give a general overview of self-care: physical, environmental, emotional, social and spiritual. It will explore importance of self-care and the impact on recovery.   Wk of 6/12. Dim 5: warning signs, triggers and cravings: none   helpful coping: attending group. Examples of benefits of drinking or drug use, or other behaviors Physical, Social mental or emotional: confidence, escape from reality, \"friends\"     Main drawbacks connected to these: not real friends that only want to drink together.  Headaches and slurred speech.  Remoarse  Benefits connected to abstinence from addictive behaviors:more able to do me and be me, I enjoy trud friends, I remember things better and will have better memories      Drawbacks you see connected with abstinence from these:  Wk of6/6 she participated in the daily reading and said she liked it.  Virginia also gave meaningful feedback to client who discussed use episode: \"it could be anyone of us\"  She said this is important and she is learning from others " "sharing.  Week of 23: Client denies riggers or warning signs this past week. She said she liked the saying \"it is just as easy to create good habits as bad ones\"   Patient reports family members and her  are concerned about their substance use.  Patient reports their recovery goals are \"abstinence forever.\"  She has had no previous detoxes or treatments and needs to gain insight into healthy coping for relapse prevention.      Dimension 6: Recovery Environment -   Previous Dimension Ratin  Current Dimension Ratin  Family Involvement - not at this time  Summarize attendance at family groups and family sessions - NA  Family supportive of treatment?  Yes    Community support group attendance - no  Recreational activities - some camping, cooking and boating    Narrative - wk of :\" I have a great deal going on with my upcoming surgery, will likely look into sober support when things settle down.  Right now my group is support\"  wk of  Client worked on her dimension 6 goals this week as she learned and discussed more about sober support. She continues to report a low desire to attend outside community based sober support meetings, however she does appear to be open to learning more about it.     wk of  Virginia discussed that she went to [place] with friends who were drinking, but that they all said they would not need to drink. She said she appreciated that but it really didn't bother her in the least, as she likes being sober. Group discussed this type of situation and some group members said it would have been hard for them. Virginia said if it ever is, she will change what she does or ask them not to use. \"I have very dear friends and I know they mean it when they say the don't need to drink around me.  sober support motivation: 5    \"I am at 5 on wanting to attend community support, but find group support very helpful!  wk of   sober support motivation:  I have so much going on with " "medical and this meeting 3x per week, that realistically, I will not likely look into community support until after my surgery at the end of the month.   WK of 7/24-client's support are her family, she is not attending support groups and motivation to attend is 2 on Likert scale. Client shared she has learned this week is this is a continuous journey and staying positive is huge. To support her sobriety this week she will continue to surround herself with good people. She is grateful for home, shelter and stability.  wk of 7/17:  sober support motivation: this group and my family.     wk of 7/10: \"I am motivated for outside support at 2/10, but when I get done with Tuesday group, I think my motivtiaon will be higher\"  wk of 7/5   We discussed \"The power of Solitude\" and how spending time alone with ourselves may not be easy or even desirable, it it is key to getting to know self.  I emphasized that studies tell us we need each other to survive and be happy, but also when we loose the ability to be alone with ourselves, sometimes our overstimulated nervous systems suffer from no place to rest and recharge.  She said this is good to discuss, as she may even consider doing it a bit more, as it \"really does recharge me\" She said she is pretty good about finding alone time and also uses journaling.  I asked client how this applies to recovery and treatment:She said she will often take a topic from group, or a question and journal about.  She said it really helps   Wk of 6/26. She said sober support motivation is  2, but I think motivation will grow as I keep settling in  I talked with clients about taking responsibility for our actions, so we can be empowered and own our life and also change if we need to. We discussed resources including AA, SMART Recovery.  I encouraged clients to start exploring meetings.  Wk of 6/19.  Virginia reports with having group 3x per week, it is a good deal of support, \" I will continue to learn " "about peer support\"  wk of 6/12client discussed resources sober support, including AA, Liver Transplant support and [place] recovery connection  wk of 6/6  goals for the wknd: look into volunteering at [facility] for writing card.  \"no community support attendance at this time\"   Client is not currently attending community sup[ort and does not have a sponsor. Client's supportive people are her family. Client shared she has friends and has not talked with many about her current situation, because \"I am a private person,\" (treatment and needing transplant).  Patient reports they are involved in community of holden activities    They reports spirituality impacts recovery in the following ways:  \"There was some changes as far as .  We do belong to a [employer] but it's been a number of years since we've been.\"  Patient believes her spirituality and sobriety are connected. Patient reported having 3 children, all adults.  Patient has 2 grandchildren.    She lives with her spouse and son. recreational/leisure activities: camping, cooking, boating, playing board games and card games.  Patient is currently disabled due to her Crohn's Disease.  Patient reports their income is obtained through SSDI disability; spouse.  Patient does not identify finances as a current stressor.         Progress made on transition planning goals: just began tx    Justification for Continued Treatment at this Level of Care:  No previous tx, needs 6 months of sobriety to be eligible for a transplant. Recent surgery and she may not need transplant, but reports she wants sobriety for her life.  Needs to learn long term relapse prevention, she reports finding that attending group is helpful for her and she is learning.She said she did not think she would get much out of this but is getting so much support and understanding of healthy coping.  She is not attending community support, at this time, \"but I find my group so helpful.  She is working on goals and " assignments.  She gets regular PEth  tests, and there have been no indications of alcohol use.    Treatment coordination activities this week: wk 8/28 Reviewed notes from sub Galen Rowland  7/31 reviewed Onelia Singh notes and discussed client with her.  7/18  Discussed client info with margaret Singh   Discussed client info with margaret Singh  Need for peer recovery support referral? No    Discharge Planning: Not at this time      Has vulnerable adult status change? No    Interdisciplinary Clinical Supervision including:  no    Are Treatment Plan goals/objectives effective? Yes  *If no, list changes to treatment plan:    Are the current goals meeting client's needs? Yes  *If no, list the changes to treatment plan.  Plan:  Continue per Master Treatment Plan    *Client agrees with any changes to the treatment plan: Yes  *Client received copy of changes: Yes  *Client is aware of right to access a treatment plan review: Yes     Staff Members Contributing To Weekly Review:      Judi Dodd, MS, LADC, LPC

## 2023-08-29 NOTE — ANESTHESIA PROCEDURE NOTES
Airway       Patient location during procedure: OR       Procedure Start/Stop Times: 8/29/2023 4:14 PM  Staff -        CRNA: Eric Chapin APRN CRNA       Performed By: CRNA  Consent for Airway        Urgency: elective  Indications and Patient Condition       Indications for airway management: miguel angel-procedural       Induction type:intravenous       Mask difficulty assessment: 1 - vent by mask    Final Airway Details       Final airway type: endotracheal airway       Successful airway: ETT - single  Endotracheal Airway Details        ETT size (mm): 7.0       Cuffed: yes       Successful intubation technique: video laryngoscopy (easy tube - med student with VL)       VL Blade Size: Glidescope 3       Grade View of Cords: 1       Adjucts: stylet       Position: Right       Measured from: gums/teeth       Secured at (cm): 23       Bite block used: None    Post intubation assessment        Placement verified by: capnometry, equal breath sounds and chest rise        Number of attempts at approach: 1       Number of other approaches attempted: 0       Secured with: pink tape       Ease of procedure: easy       Dentition: Intact and Unchanged    Medication(s) Administered   Medication Administration Time: 8/29/2023 4:14 PM    Additional Comments       Intubated by ARELI Velázquez.

## 2023-08-30 NOTE — ANESTHESIA CARE TRANSFER NOTE
Patient: Abiola Matute    Procedure: Procedure(s):  Diagnostic Laparoscopy, Laparoscopic microwave ablation of liver tumor intraoperative ultrasound of liver, lysis of adhesions 1 hour,       Diagnosis: HCC (hepatocellular carcinoma) (H) [C22.0]  Diagnosis Additional Information: No value filed.    Anesthesia Type:   General     Note:    Oropharynx: oropharynx clear of all foreign objects  Level of Consciousness: awake  Oxygen Supplementation: nasal cannula  Level of Supplemental Oxygen (L/min / FiO2): 2  Independent Airway: airway patency satisfactory and stable  Dentition: dentition unchanged  Vital Signs Stable: post-procedure vital signs reviewed and stable  Report to RN Given: handoff report given  Patient transferred to: PACU    Handoff Report: Identifed the Patient, Identified the Reponsible Provider, Reviewed the pertinent medical history, Discussed the surgical course, Reviewed Intra-OP anesthesia mangement and issues during anesthesia, Set expectations for post-procedure period and Allowed opportunity for questions and acknowledgement of understanding      Vitals:  Vitals Value Taken Time   /86 08/29/23 1900   Temp     Pulse 71 08/29/23 1901   Resp 14 08/29/23 1901   SpO2 97 % 08/29/23 1901   Vitals shown include unvalidated device data.    Electronically Signed By: DARYN Whiting CRNA  August 29, 2023  7:02 PM

## 2023-08-30 NOTE — DISCHARGE INSTRUCTIONS
[facility]  Same-Day Surgery   Adult Discharge Orders & Instructions     For 24 hours after surgery    Get plenty of rest.  A responsible adult must stay with you for at least 24 hours after you leave the hospital.   Do not drive or use heavy equipment.  If you have weakness or tingling, don't drive or use heavy equipment until this feeling goes away.  Do not drink alcohol.  Avoid strenuous or risky activities.  Ask for help when climbing stairs.   You may feel lightheaded.  IF so, sit for a few minutes before standing.  Have someone help you get up.   If you have nausea (feel sick to your stomach): Drink only clear liquids such as apple juice, ginger ale, broth or 7-Up.  Rest may also help.  Be sure to drink enough fluids.  Move to a regular diet as you feel able.  You may have a slight fever. Call the doctor if your fever is over 100 F (37.7 C) (taken under the tongue) or lasts longer than 24 hours.  You may have a dry mouth, a sore throat, muscle aches or trouble sleeping.  These should go away after 24 hours.  Do not make important or legal decisions.   Call your doctor for any of the followin.  Signs of infection (fever, growing tenderness at the surgery site, a large amount of drainage or bleeding, severe pain, foul-smelling drainage, redness, swelling).    2. It has been over 8 to 10 hours since surgery and you are still not able to urinate (pass water).    3.  Headache for over 24 hours.    To contact a doctor, call Dr Saavedra's office at [phone] (clinic) or Rocio GOODRICH --[phone] (Mon-Fri, 8:00-4:30) or:    '   [phone] and ask for the resident on call for General Surgery (answered 24 hours a day)  '   Emergency Department:    [facility]: [phone]       (TTY for hearing impaired: [phone])    [facility]: [phone]       (TTY for hearing impaired: [phone])

## 2023-08-30 NOTE — OR NURSING
I CALLED TO VERIFY THAT PATIENT COULD TAKE HARD COPY OF OXY SCRIPT TO [facility] IN [place], [place] SINCE IT HAD NOT BEEN FILLED AT THE [facility]. I SPOKE WITH SAROJ AT [facility]. SHE ASSURED ME THAT THEY WOULD BE ABLE TO TAKE CARE OF THE PATIENT'S SCRIPT TONIGHT.

## 2023-08-30 NOTE — ANESTHESIA POSTPROCEDURE EVALUATION
Patient: Abiola Matute    Procedure: Procedure(s):  Diagnostic Laparoscopy, Laparoscopic microwave ablation of liver tumor intraoperative ultrasound of liver, lysis of adhesions 1 hour,       Anesthesia Type:  General    Note:  Disposition: Outpatient   Postop Pain Control: Uneventful            Sign Out: Well controlled pain   PONV:    Neuro/Psych: Uneventful            Sign Out: Acceptable/Baseline neuro status   Airway/Respiratory: Uneventful            Sign Out: Acceptable/Baseline resp. status   CV/Hemodynamics: Uneventful            Sign Out: Acceptable CV status; No obvious hypovolemia; No obvious fluid overload   Other NRE: NONE   DID A NON-ROUTINE EVENT OCCUR?            Last vitals:  Vitals Value Taken Time   /92 08/29/23 2000   Temp 36.5  C (97.7  F) 08/29/23 1855   Pulse 68 08/29/23 2006   Resp 9 08/29/23 2006   SpO2 96 % 08/29/23 2006   Vitals shown include unvalidated device data.    Electronically Signed By: María Byrnes MD  August 29, 2023  8:08 PM

## 2023-09-06 NOTE — OP NOTE
[facility]  Operative Note    Pre-operative diagnosis: HCC (hepatocellular carcinoma) (H) [C22.0]   Post-operative diagnosis * No post-op diagnosis entered *   Procedure: Procedure(s):  Diagnostic Laparoscopy, Laparoscopic microwave ablation of liver tumor intraoperative ultrasound of liver, lysis of adhesions 1 hour,   Surgeon: Albino Saavedra MD   Assistants(s): Anam Mancia, PGY5   Anesthesia: General with Block    Estimated blood loss: Less than 50 ml    Total IV fluids: (See anesthesia record)   Blood transfusion: No transfusion was given during surgery   Total urine output: (See anesthesia record)   Drains: None   Specimens: None   Implants: None     Findings:   Extensive adhesions involving liver and diaphragm due to prior treatment..   Complications: None.   Condition: Stable               Description of procedure:    Ms. Matute was put to sleep and positioned by Anesthesia.HE@ was prepped and draped sterilely.  Pause for the cause was performed and was accurate.    We entered the abdomen using the Veress technique followed by Visiport without difficulty.  We inspected the serosal surfaces of the abdomen and did not identify any evidence of peritoneal disease.  The liver itself had gross evidence of macronodular cirrhosis consistent with the patient's prior history.  We began by mobilizing the right lobe of the liver to gain access to segment 7.  The inferior aspect of this dissection was relatively straightforward however as we progressed the liver became severely adhesed to the diaphragm, likely due to previous liver directed catheter-based therapy.  At this point we performed an intraoperative ultrasound to be sure that we knew the exact location of the area of residual tumor.  We could see the prior treatment zone as well as the area of recurrence lying in segment 7 of the liver.  We then spent well over 1 hour tediously dissecting the diaphragm away  from the liver in order to be able to safely perform an ablation.  Ultimately we were able to complete that adhesiolysis and free up the right lobe completely.  Once that was done we once again used intraoperative ultrasound to plan for our microwave ablation.  We used 2 SR 25 microwave ablation probes and placed them into the tumor percutaneously via ultrasound guidance.  The tumor was burned at 95 W for 10 minutes with gross evidence of an excellent burn.  The needles were then repositioned slightly more superficially and burn for a few additional minutes.  The needles were removed.  Hemostasis was excellent.  The liver was returned to its normal anatomic position and our ports were all removed.  The 12 mm port site was closed using #1 Vicryl in a figure-of-eight fashion.  Subcutaneous tissues were irrigated and all the skin incisions were closed using 4-0 Monocryl followed by Dermabond.    This case should certainly qualify for complexity 22 modifier because of the patient's hepatic cirrhosis which made the dissection difficult but more specifically because there were extensive dense adhesions between the liver and the diaphragm from the patient's previous liver directed therapy which added well over 1 hour to the surgical case.     The patient was awakened by Anesthesia, extubated in the operating room, and transferred to the recovery room in stable condition.  I was present throughout the entirety of the case described above.     Albino Saavedra MD, FACS  Professor, Chief, and Johan Harding Chair of Clinical Surgical Oncology   [facility]

## 2023-09-06 NOTE — TELEPHONE ENCOUNTER
Attempted to call back [facility] infusion. Number is not in service. Will send communication via email to [facility] infusion contact, Yolande Anders to infuse 9/12/23, per Dr. Fuller.

## 2023-09-07 ENCOUNTER — HOSPITAL ENCOUNTER (OUTPATIENT)
Dept: MRI IMAGING | Facility: CLINIC | Age: 58
Discharge: HOME | End: 2023-09-07
Attending: SURGERY | Admitting: SURGERY
Payer: COMMERCIAL

## 2023-09-07 DIAGNOSIS — C22.0 HCC (HEPATOCELLULAR CARCINOMA) (H): ICD-10-CM

## 2023-09-07 PROCEDURE — 255N000002 HC RX 255 OP 636: Performed by: SURGERY

## 2023-09-07 PROCEDURE — 74183 MRI ABD W/O CNTR FLWD CNTR: CPT

## 2023-09-07 PROCEDURE — A9581 GADOXETATE DISODIUM INJ: HCPCS | Performed by: SURGERY

## 2023-09-07 RX ADMIN — GADOXETATE DISODIUM 17 ML: 181.43 INJECTION, SOLUTION INTRAVENOUS at 10:29

## 2023-09-07 NOTE — TELEPHONE ENCOUNTER
September 7, 2023    Called Yolande Qureshi RN at [facility], Left message and contact info to return call regarding: infusion

## 2023-09-07 NOTE — TELEPHONE ENCOUNTER
Spoke with Yolande Qureshi RN at [facility], she confirm receipt of the information to delay infusion until 9/13/23. She will have the orders faxed and asking for it to be signed and faxed back on 9/12/23.

## 2023-09-11 ENCOUNTER — HOSPITAL ENCOUNTER (OUTPATIENT)
Dept: BEHAVIORAL HEALTH | Facility: CLINIC | Age: 58
Discharge: HOME | End: 2023-09-11
Attending: FAMILY MEDICINE
Payer: COMMERCIAL

## 2023-09-11 PROCEDURE — H2035 A/D TX PROGRAM, PER HOUR: HCPCS | Mod: HQ

## 2023-09-11 NOTE — GROUP NOTE
"Group Therapy Documentation    PATIENT'S NAME: Abiola Matute  MRN:   [id number]  :   1965  ACCT. NUMBER: [id number]  DATE OF SERVICE: 23  START TIME: 12:00 PM  END TIME:  2:00 PM  FACILITATOR(S): Judi Dodd LADC  TOPIC: BEH Group Therapy  Number of patients attending the group:  6  Group Length:  2 Hours    Group Therapy Type: Addiction and Psychoeducation    Summary of Group / Topics Discussed:    Balanced Lifestyle , Boundaries, Communication, Distress Tolerance, Forgiveness, Journaling, Proper Nutrition & Exercise, and Resentments      Attestation: Dr. De La Rosa - Provides oversight and supervision of care.     We checked in on logistics and any updates from last week and anything needed to discuss.  New client introduced herself and discussed what brought her to treatment, group was very welcoming.  Clients read  \"The Man/Gal in the glass\"  and I asked them each to write down what is evoked in them regarding life and treatment/recovery.  Most clients had many issues and changes with Medical so we spent a large part of group on this.  I emphasized I am not a Dr and this is not a medical group, yet their medical issues are of concern and we can spend more time discussing today.   Clients checked in on all dimensions for check in.    Group Attendance:  Attended group session    Patient's response to the group topic/interactions:  cooperative with task, discussed personal experience with topic, and expressed understanding of topic    Patient appeared to be Actively participating.        Client specific details:Virginia told group her surgery went well and most of her check in was associated with that.  She said she sleeps a lot still, but wants to heal and is listening to her body.  She has follow up appts over the next week but expresses that so far Drs ar optimistic that they got her cancer.  Today's session focussed on  dim 2 medical, check in of all dimensions and  dim 5 identifying relapse " "triggers and cues  Sights, sounds and situations that are triggers : concerts and comedy shows, but support, discussion and a fun NA drink are how I deal with it.  \"Not many of these connected to people fit for me, because my family and friends are so supportive of my sobriety\"      Dim1: ASHISH: no change reported   Dim2: medical issues or appts: see above  Dim 3: \"no thoughts of self harm or concerns\"  on likert scale 1-low and 10 -high:   Anxiety:  2  stress: 1 depression 0  issues to discuss and helpful coping:  right now resting  Dim 4 on likert scale 1-low and 10 -high:  \"still a 10!\"  motivation for sobriety  Dim 5: cravings, warning signs, triggers: see above  Dim 6 sober support motivation:  5, but I will look into SMART Recovery    Clients participated in a short meditation, as a follow up from last week: \"I am at the center of peace\"    P)  Follow medical recommendations, continue to rest as needed.  Continue assignment on  identifying relapse triggers and cues.     Attestation:  Dr. De La Rosa - Provides oversight and supervision of care.  Judi Dodd, MS, LADC, LPC                                                 "

## 2023-09-13 ENCOUNTER — HOSPITAL ENCOUNTER (OUTPATIENT)
Dept: BEHAVIORAL HEALTH | Facility: CLINIC | Age: 58
Discharge: HOME | End: 2023-09-13
Attending: FAMILY MEDICINE
Payer: COMMERCIAL

## 2023-09-13 PROCEDURE — H2035 A/D TX PROGRAM, PER HOUR: HCPCS | Mod: HQ

## 2023-09-13 NOTE — GROUP NOTE
Group Therapy Documentation    PATIENT'S NAME: Abiola Matute  MRN:   [id number]  :   1965  ACCT. NUMBER: [id number]  DATE OF SERVICE: 23  START TIME: 12:00 PM  END TIME:  2:00 PM  FACILITATOR(S): Judi Dodd LADC  TOPIC: BEH Group Therapy  Number of patients attending the group:  5  Group Length:  2 Hours    Group Therapy Type: Addiction and Skills/Education    Summary of Group / Topics Discussed:    Psychoeducation/Skills Goal Setting (RENEE)  This topic will give a general overview of short, intermediate and long term goals including the value of goal setting. It will explore how the pursuit of instant gratification adversely affects the ability to reach goals.  This topic will assist the patients in completing their recovery care plans.    Objective(s):   Patients will identify the characteristics of short, intermediate and long term goals.  Patients will be able to list one personal goal under each category  Patients will identify at least one way instant gratification impacts their ability to reach goals    Structure (modalities, homework, worksheets, etc):   Provide psychoeducation on goal setting and overcoming obstacles to goal attainment.  Facilitate group discussion around each patient s current state of goal setting and attainment.  Complete a worksheet on personal goal setting    Expected therapeutic outcome(s):   Patient will:  Set goals and negotiate obstacles to reaching the goals   Experience a higher incidence of goal attainment    Therapeutic outcome(s) measured by:   Completion of goal setting worksheet    Group Attendance:  Attended group session     Patient's response to the group topic/interactions:  cooperative with task, discussed personal experience with topic, expressed understanding of topic, and listened actively     Patient appeared to be Actively participating and Attentive.     Client specific details:Virginia said each day she is healing more from surgery but is still  "quite sore.  \"I have had absolutely no cravings or desire to use alcohol\". \"I am blessed about that and how well the surgery went, even though it was a bit more complicated then they thought\"  Today's session focused on dim 6, goal setting and structure for the weekend  Virginia  completed her goal setting worksheet and discussed things that can get in the way of her goal: others that suck energy out of her, old thinking about how nice a drink would be  She said she will go camping with family this weekend in their new RV, she also has to rest a lot.  \"I love football and will watch most any game\"  She participated in meditation saying she liked ending group that way     P)  Follow plan of structure for weekend.   Continue to notice how you talk to self and treat self during a day.     Judi Dodd, MS, LADC, LPC        Attestation:  Dr. De La Rosa - Provides oversight and supervision of care.     "

## 2023-09-14 NOTE — PROGRESS NOTES
"[facility] Weekly Treatment Plan Review      Attestation:   Dr. De La Rosa - Medical Director - Provides oversight and supervision of care.        Date Span: Monday: 23 through 1723     Date     Group Therapy 2 Completed these sessions 2 hours   0 hours 0 hours       Individual Therapy                Family Therapy                 Psychoeducation                 Other (Specify)                 Client had no absences this week     Wk o f  Virginia is excused this week due to holiday and to recovering from her surgery last week.  All notes below are based on last attendance and will be updated when client returns next week    Clients individual sessions noted below. Client had 1 excused absence this week, due to having surgery.    Client has no absences this week    Client has one excused absence this week , due to medical appts.  Client excused from group , due to medical appointment.     Program Running Totals: (No Phase 1 IOP due to this program being only OP level of care)  Total # of Phase 2 OP Group Sessions: 29 for 58  Virginia gets 60 sessions for 120 hours at this time.  She is at 36  Counting her individual she has used 30  sessions and 53 hours  Total # of Phase 3 OP Recovery Management Group Sessions: None    Total # of 1:1 Sessions:  1 hour BALWINDER   1 hour 5/30  7/3,                                     Darinel , , ( & -Onelia)                    Weekly Treatment Plan Review     Treatment Plan initiated on: .    Dimension1: Acute Intoxication/Withdrawal Potential -   Previous Dimension Ratin  Current Dimension Ratin  Date of Last Use 22  Any reports of withdrawal symptoms - No      Dimension 2: Biomedical Conditions & Complications -   Previous Dimension Ratin  Current Dimension Ratin    Medical Concerns:wk of  \"no concerns, continuing to recovery from surgery\"  Surgery  " "for her cancer  /821/2023 Reports having no new or worsening health conditions. Reports having an appointment this week on 8/23/2023 with her Cardiologist.     wk of 8/14 No concerns I did  my pre op eval for my upcoming surgery with Dr. Bear and nurse Sakina Glass  wk of 8/7 No new concerns  wk of 7/31:  no, just good news that they may be able to remove all my tumore and I may not need a liver.  WK of 7/24-Client has an appointment with surgeon regarding increase in tumor. She is excused from programming due time conflict.  wk of 7/17 She had appt with radiology on 7/14, continues to work with her team due to LT, cancer and rash.  Virginia told group she is dealing with some news, medically, but working with her Drs.  She said she isn't wanting to share a whole lot, until she gethers her information, but she did want group to know she has support and appreciates just being able to share what she did  wk 7/10She reports rash and working with , also had CT and MRI related to ongoing care for cancer and need for liver.  wk of 7/3  I get an infusion 1x per month and usually by week 3, I feel more sore and more tired, but I am grateful for the infusions.  \"no concerns beyond ongoing liver cancer and Crohns\"  wk of 6/12 She saw Jazmin Barrios at Urgent care for skin issues.  She also said she met with her GI practioner  She reports having liver cancer and Crohn's disease.  \"right now I am doing pretty well  Outside medical appointments this week (list provider and reason for visit) 8/29 Surgery with care team for cancer  wk of 8/14 No concerns I have my pre op eval with Dr. Glass, for my upcoming surgery   Wk of 8/7 No appts this week   wk of 7/31: no appts.  wk of 7/24 she saw surgeon Junior Bonilla, she is dealing with her liver tumor  wk of 7/17 She had appt with radiology, Dr. Shah,on 7/14, continues to work with her team due to LT, cancer and rash  : wk of 7/6 she saw Susanna RAZA for rash on leg.  Had a " PeT test , see below. No issues  :  she saw Jazmin Meng PA-C for a flare up of her psoriasis likely related to her other medical issues, per client     Current Medications & Medication Changes:  Current Outpatient Medications   Medication    ammonium lactate (AMLACTIN) 12 % external cream    clobetasol (TEMOVATE) 0.05 % external cream    furosemide (LASIX) 40 MG tablet    inFLIXimab (REMICADE) 100 MG injection    Multiple Vitamins-Minerals (MULTIVITAMIN & MINERAL PO)    oxyCODONE (ROXICODONE) 5 MG tablet    senna-docusate (SENOKOT-S/PERICOLACE) 8.6-50 MG tablet    spironolactone (ALDACTONE) 100 MG tablet    triamcinolone (KENALOG) 0.1 % external cream    UNABLE TO FIND     No current facility-administered medications for this encounter.     Facility-Administered Medications Ordered in Other Encounters   Medication    BREEZA Lemon-Lime flavored oral liquid for Neutral Abdominal/Pelvic Imaging 1,000 mL    hyoscyamine (LEVSIN/SL) sublingual tablet 125 mcg     Medication Prescriber:  Linda Macdonald  Taking meds as prescribed? Yes  Medication side effects or concerns:  no        Dimension 3: Emotional/Behavioral Conditions & Complications -   Previous Dimension Ratin  Current Dimension Ratin  PHQ2:       2023     5:00 PM 2023     8:00 AM 2023     1:34 PM 2023    10:00 AM 3/28/2023    11:21 AM 3/26/2023     1:45 PM 2023     9:35 AM   PHQ-2 (  [employer])   Q1: Little interest or pleasure in doing things 0 0    0 0 0 0 0 0   Q2: Feeling down, depressed or hopeless 0 0    0 0 0 0 0 1   PHQ-2 Score 0 0    0 0 0 0 0 1   Q1: Little interest or pleasure in doing things  Not at all   Not at all  Not at all   Q2: Feeling down, depressed or hopeless  Not at all   Not at all  Several days   PHQ-2 Score  0   0  1      GAD2:       2023     9:54 AM 2023    10:00 AM 2023     8:00 AM 2023     5:00 PM   TAINA-2   Feeling nervous, anxious, or on edge 1 1 0    0    0    0    0  1   Not being able to stop or control worrying 1 0 0    0    0    0    0 0   TAINA-2 Total Score 2 1 0    0    0    0    0 1     PROMIS 10-Global Health (all questions and answers displayed):       2/16/2023     9:58 AM 2/22/2023     7:32 PM 5/25/2023    10:00 AM 7/19/2023     8:02 AM 8/16/2023     5:00 PM   PROMIS 10   In general, would you say your health is: Good Good  Good    In general, would you say your quality of life is: Good Good  Very good    In general, how would you rate your physical health? Fair Fair  Fair    In general, how would you rate your mental health, including your mood and your ability to think? Good Good  Very good    In general, how would you rate your satisfaction with your social activities and relationships? Good Good  Excellent    In general, please rate how well you carry out your usual social activities and roles Good Good  Very good    To what extent are you able to carry out your everyday physical activities such as walking, climbing stairs, carrying groceries, or moving a chair? Completely Completely  Mostly    In the past 7 days, how often have you been bothered by emotional problems such as feeling anxious, depressed, or irritable? Sometimes Rarely  Rarely    In the past 7 days, how would you rate your fatigue on average? Mild Mild  Mild    In the past 7 days, how would you rate your pain on average, where 0 means no pain, and 10 means worst imaginable pain? 3 3  3    In general, would you say your health is: 3 3 3 3    3    3    3    3 3   In general, would you say your quality of life is: 3 3 4 4    4    4    4    4 3   In general, how would you rate your physical health? 2 2 3 2    2    2    2    2 3   In general, how would you rate your mental health, including your mood and your ability to think? 3 3 4 4    4    4    4    4 4   In general, how would you rate your satisfaction with your social activities and relationships? 3 3 5 5    5    5    5    5 4   In general, please  "rate how well you carry out your usual social activities and roles. (This includes activities at home, at work and in your community, and responsibilities as a parent, child, spouse, employee, friend, etc.) 3 3 5 4    4    4    4    4 4   To what extent are you able to carry out your everyday physical activities such as walking, climbing stairs, carrying groceries, or moving a chair? 5 5 4 4    4    4    4    4 3   In the past 7 days, how often have you been bothered by emotional problems such as feeling anxious, depressed, or irritable? 3 2 2 2    2    2    2    2 2   In the past 7 days, how would you rate your fatigue on average? 2 2 2 2    2    2    2    2 2   In the past 7 days, how would you rate your pain on average, where 0 means no pain, and 10 means worst imaginable pain? 3 3 4 3    3    3    3    3 2   Global Mental Health Score 12 13 17 17    17    17    17    17 15   Global Physical Health Score 15 15 14 14    14    14    14    14 14   PROMIS TOTAL - SUBSCORES 27 28 31 31    31    31    31    31 29     Mental health diagnosis none  Date of last SIB:  never  Date of  last SI:  never  Date of last HI: never  Behavioral Targets:  see tx plan  Risk factors:  Client dealing with liver disease/cancer and Crohns, client needs a new liver,   Protective factors:  spirituality, forward/future oriented thinking, safe and stable environment, regular physical activity, living with other people and structured day  Current MH Assignments:  see tx plan      Narrative: wk of 9/11  : \"no thoughts of self harm or concerns\"  on likert scale 1-low and 10 -high:   Anxiety:  2  stress: 1 depression 0  issues to discuss and helpful coping:  right now resting  wk of 8/28, RR changed to 0 from 1.  Virginia continues to report healthy coping for anxiety symptoms.  She remains optimistic and finds using tools learned are helpful, as is discussion with supportive people.  : \"no thoughts of self harm or concerns\"  on likert scale 1-low " "and 10 -high:   Anxiety:  7  stress: 1 depression 2     Virginia will be having her surgery tomorrow.  She discussed \"anxiety mireya high, yet I am hopeful\"  She said she has done all she can with her health and not drinking and preparing herself mentally.  She said she was glad she had group to attend and gets \"immense support\"  wk of 8/21/2023 She reports her anxiety is down and she experiencing no stress or depression. Client worked on her Dimension Three goals this week as she learned about and discussed the topics of Shame, Stigma and Mindfulness.     wkof 8/14  Ways I can think differently: listen to my critical voice and change my thoughts  Ways I can do things differently:  talk to myself like I would a friend.  \"no thoughts of self harm or concerns\"  on likert scale 1-low and 10 -high:   Anxiety:  0  stress: 0 depression 0  issues to discuss and helpful coping:  doing fun things, attending this group, being sober  wk of 8/7 ratings on likert scale 1-low and 10 -high: Anxiety: 2 stress: 1 depression 2. She participated in discussion on \"change the channel\" when getting into an anxiety loop. \"It is a simple way to do something more productive. Virginia actively participated in discussion on Thought distortions and discussed which ones are the ones she actively wants to focus on for the time being: catastrophising and prediction.  She discussed  two situations where the techniques could be helpful, including: Should's and musts and not setting up unrealistic expectations of how she or life should be.  I want to remind myself \"until someone has walked a mile in my shoes, they don't get to  me and the same goes for me with them.  Virginia actively participated in meditation and said it was good to take that time to be.  \"I liked it\"  wk of 7/31  she participated in psychoeducation on Understanding my anxiety   This topic helped client to gain awareness of thoughts and feelings associated with anxiety and act with " "awareness when feeling anxiety and use helpful coping.  Client did actively participate in group discussions, and was able  to identify anxious thoughts, physical feelings when having anxiety: racing heart, shaking, confusion  \"why me\"  She was able to  identify 2 helpful coping methods: breath, talk with her  who is a comfort, stop the story telling/negativity when aware.  Virginia discussed that her tumor is shrinking and she is \"cautiously optimistic\". She said she wanted to share that she has many feelings associated with this, \"it even could mean I won't need a LT, but that remains to be seen\"  I told Virginia she has been shown how with our spirituality and belief, sometimes things can happen that were never anticipated.  She said \"so true\" I never had an idea that was even a part of my hope\"      Wk of 7/24- client denies thoughts of self-harm. On Likert scall 1-low and 10-high client rates anxiety 1  depression 0 and stress 2. She shared her new diagnosis of tumor growth has impacts stress and anxiety.   wk of 7/17: no thoughts of self harm or concerns\" on likert scale 1-low and 10 -high:   Anxiety:  5  stress: 5 depression 1  issues to discuss and Helpful coping:  All related to my medical, but I have great medical team and family support.  Client said of the reading:I like the ongoing discussions of shame and this reading brought me awareness that I have some to deal with regarding the nature of why many get liver cancer.       wk of 7/10:  She was able to repeat the steps of 5 senses, as a review.   \"no thoughts of self harm or concerns\".  Clients feedback to group member  on likert scale 1-low and 10 -high:   Anxiety:  2  stress: 2  depression 2  issues to discuss and Helpful coping: talking with you guys.  I had a craving that was pretty big this week.  Psychoeducation/Skills The thinking-feeling connection. This topic will address information from the [employer] about how our " "thoughts influence our feelings.  Client was able to define automatic thoughts and make a connection with some that she has.   She said that she has been continueing to gain awareness of her beliefs vs perceptions vs what is a thought and what is a feeling.  Discussed that reminding herself it is ok to have thoughts and feelings, but they are not facts for other people.    Client participated in lovingkindness meditation and said it was just what she needed today and it that it was helpful and grounding.  \"Thank you for doing it\"    wk of 7/3: Clients participated in 5 senses psychoeducation discussion, participated in guided practice and was able to name 5 parts, and said she feels it will be a helpful tool  \"no thoughts of self harm or concerns\".   I discussed the research on gratitude journals and expressed she has seemed like she has an \"attitude of gratitude\".  She said she feels blessed and hopes she does.  on likert scale 1-low and 10 -high:   Anxiety:  0  stress: 0 depression 0  issues to discuss and Helpful coping: I am learning to say no, and that boundaries are good    wk of 6/26  Attended MH skills group with Darinel Au.    She participated in psychoeducation on Core beliefsThis topic will discuss core beliefs, as a lens through which we see life and how that affects our choices.  Virginia said the discussion on core beliefs is something she will spend some time on, as the awareness is helpful.  I told her we will follow up on Wednesday with more that can be helpful when we notice harmful core beliefs.   \"no thoughts of self harm or concerns\"  on likert scale 1-low and 10 -high:   Anxiety:  1 stress: 1 depression 0  issues to discuss and Helpful coping: learning about self, anxiety and stress  wk of 6/19 \"no thoughts of self harm or no concerns\".  She reports on likert scale 1-low and 10 -high:   Anxiety:  1  stress: 1 depression 0  issues to discuss and Helpful coping: getting to know people better " "in group, hearing other's helpful coping.  Attended MH skills group with Darinel Angely.  We discussed anticipating some situations and \"gearing up\" by practicing deep breathing. I reminded client of previous discussions where beliefs often guide how we feel about certain situations, but that we can reframe them, or look at them for the particular situation.      Wk of 6/12  I am feeling much better and less anxiety about group attendance as I continue to attend and get to know others. \"no thoughts of self harm or concerns\". On likert scale 1-low and 10 -high:   Anxiety:  1  stress: 1 depression 1  issues to discuss and Helpful coping:  Breathing, getting to others in group  Wk of 6/6 \"no thoughts of self harm\"  Attended MH skills group with Darinel Au  Week of 5/31/23 :Client reports feeling anxious due to first treatment experience, not knowing what to expect, her  Mother's declining health-needing a placement for higher level of care, spouse recently lost his job-need to obtain COBRA insurance and unable to care for grandchildren because of attending treatment. Client was saw Traci Aguilar and has not seen them in about 6 weeks. To follow up with primary counselor re: seeing psychotherapist or return to [facility].  Client rates the following on a scale of 1 (low) to 10 (high):  Anxiety- 5  Depression -1  Stress- 5 as a result of above concerns.   Client participated in psychotherapy/education on self-care, introduction to relaxation meditation-tensing and relaxing muscles, and sleep hygiene.  Current Mental Health symptoms include: . Active interventions to stabilize mental health symptoms this week :   At SI her PHQ-2 score was 0 and his TAINA-2 score was 1.  PROMISE 10 was  31:  She reports that at this time she is dealing with her mother's living situation and health issues. She reports that her father has passed away and that she is estranged from 1 sister.         Dimension 4: Treatment " "Acceptance / Resistance -   Previous Dimension Ratin  Current Dimension Ratin  RENEE Diagnosis:  Alcohol Use Disorder   303.90 (F10.20) Severe In a controlled environment  Commitment to tx process/Stage of change- contemplation  RENEE assignments - see tx plan      Narrative - wk of   on likert scale 1-low and 10 -high: 10   motivation for sobriety 10.  Motivated to continue a life of sobriety, \"I am learning so much about self.  wk of 2023 Client expressed this week about her alcoholism that that she liikes that \"I'm getting better not as shameful.\" That for her it has helped to accept alcoholism, \"occurs as a disease.\"     wk of   Virginia told group \"I know I am an alcoholic\" she said she is so glad to have had to find her way to the group, even if it was through health issues or she would still be doing damage.  motivation for sobriety; 10 she likes being sober and reports she likes learning and attending group.   wk of   motivation for sobriety 10.  Motivation is high to have quality of life  wk of   on likert scale 1-low and 10 -high:  motivation for sobriety: 10   WK of -motivation 10 on Likert scale.she expressed strong commitment to sobriety, see notes dated  for more details.  wk of :  on likert scale 1-low and 10 -high:  motivation for sobriety: 10+  wk of 7/10  My motivation for sobriety is 10/10\".  I think it is so important to attend and be dedicated to support in this group, and to \"say things outloud, so it takes the power away, as you say, Alana\"  wk of 7/3  on likert scale 1-low and 10 -high:  motivation for sobriety 10.  Virginia said  session really stuck with her and she thought about a lot of things and is really understanding why attendance and building on the days and lessons is important.     wk of   sober support motivation:  2, but I think motivation will grow as I keep settling in and I am motivated 10/10 for sobreity.    Wk of :  Client " "reports motivation for sobriety is 10 (high) Motivation for attending community support 5 (on scale 1 low-10 high).   Virginia expressed to her group how helpful they are to her and how much she appreciates all that members shared about their stories.  She said she gains such helpful insight for self   Week of  Virginia discussed that she has been late a couple times, but realized with 2 other members gone, how this speaks to the importance of being on time and valuing and respecting others time, too.   She will also miss tomorrows group due to a long awaited medical appt. Dim 4: on likert scale 1-low and 10 -high:  Motivation for sobriety:  10  motivation for sober support attendance: not doing yet  Wk of   Virginia also said she finds motivation for sobriety in her family and friends, \"but really deep down need to do this for myself\"  Told counselor and group how she didn't think she would get much out of tx and already is getting so much and is grateful for older adult group   Week of 23: Client reports motivation for sobriety is 10 (high) Motivation for attending community support 5 (on scale 1 low-10 high).  The patient is motivated, to explore treatment.  She said \"of course I am partly here to fulfill requirements for liver transplant, but I want to learn healthy coping for lifelong sobriety.      Dimension 5: Relapse / Continued Problem Potential -   Previous Dimension Ratin  Current Dimension Ratin  Relapses this week - None  Urges to use - None  UA results -   No results found for this or any previous visit (from the past 168 hour(s)).    Using ReSet Clem: N/A    Narrative- wk of  Sights, sounds and situations that are triggers : concerts and comedy shows, but support, discussion and a fun NA drink are how I deal with it.   Virginia said each day she is healing more from surgery but is still quite sore.  \"I have had absolutely no cravings or desire to use alcohol\".   Gone week of  for surgery   " "wk of 8/28  cravings, warning signs, triggers: none I went to a [employer] game and had no cravings.\"   Wk of 8/21/2023 She reports having no cravings to drink this week and having a \"wonderful week.\"       Wk of 8/14   holley discussed a big warning sign for self is thinking I can drink again, especially socially.  II think about  it regularly.  Group discussed that awareness of this is good and it is likely normal to do this.  I encouraged her to continue to discuss this and down the road to keep in mind with sponsor, therapist, etc. And work through it.  I told her it is really good she can say this outloud.   Something I am doing well: having plans of structure, especially when around situations I used to drink in     any cravings, warning signs, triggers: the Spinal Modulation could have been but wasn't .  I have supportive people and that helps, but so does the realization of my use patterns and how I feel better not using  wk of 8/7 Virginia said that a trigger for her was going to the casino for a comedy show. \"I haven't been there since starting tx\"  My  and I just kept walking and he also said \"I got you\".  She said her cravings only lasted a couple minutes.  We discussed how this can be common and some group members said they cannot go to the same places at all because of that kind of trigger.  She said \"I get it, and will continue to be aware of situations like this   wk of 7/31   cravings, warning signs, triggers: None this week   WK of 7/24-client denies triggers, cravings or warning signs this past week.  wk of 7/17:  cravings, warning signs, triggers:i went to a musical festival, had a few cravings, but also had insight when watching others use, \"glad it isn't me\"  Wk of 7/10 Had a PeTH test 7/7, see above. No issues. She discussed the Serenity Prayer and that she finds it helpful.  She also discussed having cravings at a 4 of 10 when cooking, and what she found helpful:  Processed, talked with , talk " "with group today about it.   Virginia said she really had quite a learning week with events in group, discussions and assignments.  \"My heart goes out to the new person the other day, but also I had some anger about all the disruptions and negativity from her\"  \"I am ready to move on, and learn what I learned, and know that could be any one of us\"  wk of 7/5  cravings, warning signs, triggers: none.  My adult kids are caring and asked if I thought I would be OK going to [facility], as there is drinking there.  She told them she appreciates the concern, but feels with them and little to no triggers, for quite some time, she didn't feel it was a problem.  Virginia said she had a wonderful time with her family and is not concerned about use over 4th of July holiday either, she has a good plan for spending time with family and focusing on good food.   wk of 6/26  Dim 5: cravings, warning signs, triggers: none.  Wed reading was focused blame.  It said blaming is hiding, that when we blame others we try to hide our character defects.  We discussed the meaning of character defects. \"I was in big denial for a long time, it took being told I would need a new liver for me to really realize what drinking was doing. \"it hit me like a ton of bricks when I finally really heard it\"  wk of 6/19  She participated in  Psychoeducation/Skills: Self-Care.  Client s completed self-care assessment and described self-care as feeling better physically. Client identified physical self-care they do well- attending medical appointments, one they do poorly or needs improvement- eating healthy food and barriers that prevent themselves from self-care - cost of food is expensive, the shopping and prep.  This topic will give a general overview of self-care: physical, environmental, emotional, social and spiritual. It will explore importance of self-care and the impact on recovery.   Wk of 6/12. Dim 5: warning signs, triggers and cravings: none   " "helpful coping: attending group. Examples of benefits of drinking or drug use, or other behaviors Physical, Social mental or emotional: confidence, escape from reality, \"friends\"     Main drawbacks connected to these: not real friends that only want to drink together.  Headaches and slurred speech.  Remoarse  Benefits connected to abstinence from addictive behaviors:more able to do me and be me, I enjoy trud friends, I remember things better and will have better memories      Drawbacks you see connected with abstinence from these:  Wk  she participated in the daily reading and said she liked it.  [place]ia also gave meaningful feedback to client who discussed use episode: \"it could be anyone of us\"  She said this is important and she is learning from others sharing.  Week of 23: Client denies riggers or warning signs this past week. She said she liked the saying \"it is just as easy to create good habits as bad ones\"   Patient reports family members and her  are concerned about their substance use.  Patient reports their recovery goals are \"abstinence forever.\"  She has had no previous detoxes or treatments and needs to gain insight into healthy coping for relapse prevention.      Dimension 6: Recovery Environment -   Previous Dimension Ratin  Current Dimension Ratin  Family Involvement - not at this time  Summarize attendance at family groups and family sessions - NA  Family supportive of treatment?  Yes    Community support group attendance - no  Recreational activities - some camping, cooking and boating    Narrative - wk of  sober support motivation:  5, but I will look into SMART Recovery. She participated in Psychoeducation/Skills Goal Setting (RENEE)  This topic will give a general overview of short, intermediate and long term goals including the value of goal setting. She completed her goal setting worksheet and discussed things that can get in the way of her goal: others that suck " "energy out of her, old thinking about how nice a drink would be  Plan of structure for weekend: She said she will go camping with family this weekend in their new RV, she also has to rest a lot.  \"I love football and will watch most any game\"  absent wk o f9/4  wk of 8/28:\" I have a great deal going on with my upcoming surgery, will likely look into sober support when things settle down.  Right now my group is support\"  wk of 8/21 Client worked on her dimension 6 goals this week as she learned and discussed more about sober support. She continues to report a low desire to attend outside community based sober support meetings, however she does appear to be open to learning more about it.     wk of 8/14 Virginia discussed that she went to [place] with friends who were drinking, but that they all said they would not need to drink. She said she appreciated that but it really didn't bother her in the least, as she likes being sober. Group discussed this type of situation and some group members said it would have been hard for them. Virginia said if it ever is, she will change what she does or ask them not to use. \"I have very dear friends and I know they mean it when they say the don't need to drink around me.  sober support motivation: 5   8/7 \"I am at 5 on wanting to attend community support, but find group support very helpful!  wk of 7/31  sober support motivation:  I have so much going on with medical and this meeting 3x per week, that realistically, I will not likely look into community support until after my surgery at the end of the month.   WK of 7/24-client's support are her family, she is not attending support groups and motivation to attend is 2 on Likert scale. Client shared she has learned this week is this is a continuous journey and staying positive is huge. To support her sobriety this week she will continue to surround herself with good people. She is grateful for home, shelter and stability.  wk of 7/17:  sober " "support motivation: this group and my family.     wk of 7/10: \"I am motivated for outside support at 2/10, but when I get done with Tuesday group, I think my motivtiaon will be higher\"  wk of 7/5   We discussed \"The power of Solitude\" and how spending time alone with ourselves may not be easy or even desirable, it it is key to getting to know self.  I emphasized that studies tell us we need each other to survive and be happy, but also when we loose the ability to be alone with ourselves, sometimes our overstimulated nervous systems suffer from no place to rest and recharge.  She said this is good to discuss, as she may even consider doing it a bit more, as it \"really does recharge me\" She said she is pretty good about finding alone time and also uses journaling.  I asked client how this applies to recovery and treatment:She said she will often take a topic from group, or a question and journal about.  She said it really helps   Wk of 6/26. She said sober support motivation is  2, but I think motivation will grow as I keep settling in  I talked with clients about taking responsibility for our actions, so we can be empowered and own our life and also change if we need to. We discussed resources including AA, SMART Recovery.  I encouraged clients to start exploring meetings.  Wk of 6/19.  Virginia reports with having group 3x per week, it is a good deal of support, \" I will continue to learn about peer support\"  wk of 6/12client discussed resources sober support, including AA, Liver Transplant support and [place] recovery connection  wk of 6/6  goals for the wknd: look into volunteering at [facility] for writing card.  \"no community support attendance at this time\"   Client is not currently attending community sup[ort and does not have a sponsor. Client's supportive people are her family. Client shared she has friends and has not talked with many about her current situation, because \"I am a private person,\" (treatment and needing " "transplant).  Patient reports they are involved in community of holden activities    They reports spirituality impacts recovery in the following ways:  \"There was some changes as far as .  We do belong to a [employer] but it's been a number of years since we've been.\"  Patient believes her spirituality and sobriety are connected. Patient reported having 3 children, all adults.  Patient has 2 grandchildren.    She lives with her spouse and son. recreational/leisure activities: camping, cooking, boating, playing board games and card games.  Patient is currently disabled due to her Crohn's Disease.  Patient reports their income is obtained through SSDI disability; spouse.  Patient does not identify finances as a current stressor.         Progress made on transition planning goals: just began tx    Justification for Continued Treatment at this Level of Care:  No previous tx, needs 6 months of sobriety to be eligible for a transplant. Recent surgery and she may not need transplant, but reports she wants sobriety for her life.  Needs to learn long term relapse prevention, she reports finding that attending group is helpful for her and she is learning.She said she did not think she would get much out of this but is getting so much support and understanding of healthy coping.  She is not attending community support, at this time, \"but I find my group so helpful.  She is working on goals and assignments.  She gets regular PEth  tests, and there have been no indications of alcohol use.    Treatment coordination activities this week: wk 8/28 Reviewed notes from sub Galen Rowland  7/31 reviewed Onelia Singh notes and discussed client with her.  7/18  Discussed client info with sub Onelia Singh   Discussed client info with margaret Singh  Need for peer recovery support referral? No    Discharge Planning: Not at this time      Has vulnerable adult status change? No    Interdisciplinary Clinical Supervision including:  no    Are " Treatment Plan goals/objectives effective? Yes  *If no, list changes to treatment plan:    Are the current goals meeting client's needs? Yes  *If no, list the changes to treatment plan.  Plan:  Continue per Master Treatment Plan    *Client agrees with any changes to the treatment plan: Yes  *Client received copy of changes: Yes  *Client is aware of right to access a treatment plan review: Yes     Staff Members Contributing To Weekly Review:      Judi Dodd, MS, LADC, LPC

## 2023-09-15 ENCOUNTER — VIRTUAL VISIT (OUTPATIENT)
Dept: GASTROENTEROLOGY | Facility: CLINIC | Age: 58
End: 2023-09-15
Attending: INTERNAL MEDICINE
Payer: COMMERCIAL

## 2023-09-15 VITALS — WEIGHT: 184 LBS | HEIGHT: 66 IN | BODY MASS INDEX: 29.57 KG/M2

## 2023-09-15 DIAGNOSIS — C22.0 HCC (HEPATOCELLULAR CARCINOMA) (H): Primary | ICD-10-CM

## 2023-09-15 PROCEDURE — 99214 OFFICE O/P EST MOD 30 MIN: CPT | Mod: VID | Performed by: INTERNAL MEDICINE

## 2023-09-15 ASSESSMENT — PAIN SCALES - GENERAL: PAINLEVEL: SEVERE PAIN (6)

## 2023-09-15 NOTE — NURSING NOTE
Is the patient currently in the state of [place]? YES    Visit mode:VIDEO    If the visit is dropped, the patient can be reconnected by: VIDEO VISIT: Send to e-mail at: [email or web address]    Will anyone else be joining the visit? NO  (If patient encounters technical issues they should call [phone])    How would you like to obtain your AVS? MyChart    Are changes needed to the allergy or medication list? No    Reason for visit: LORETO HEARN

## 2023-09-15 NOTE — PROGRESS NOTES
[facility]  LIVER CLINIC FOLLOW UP    A/P  Ms. Matute is a 58 Y F with DUNN and alcohol related cirrhosis and new dx HCC. She is undergoing LT evaluation. Last alcohol reported was 8. MELD 9 ABO A    HCC 3.2 cm seg 7. . She has been discussed at tumor conference and has seen IR and oncology as well as Dr. Saavedra. She is post MWA 8/29/23. Recent MRI looked like treatment was as expected    Cholecystitis Cystic duct stent placed in 2020. Follow up imaging showed expected migration. No issues since then.  Variceal screening  No varices on EGD 2019. No varices 5/11/23. Due 3 years  Thrombocytopenia 2/2 ETOH and cirrhosis. Plt around 100.    Ascites Controlled with diuretics. Discussed this and low Na. Fenelton 100 mg and furosemide 40. Renewed.With bloating sensation, june get US. COuld be 2/2 laparoscopic MWA  AUD positive PETH 1/20/23. Low positive. States last alcohol around 6/2023. In CD treatment now.. Discussed that absolute abstinence is required.  Bone health DEXA showed osteopenia. Taking ca and D. DEXA Due 2022  Crohns With chronic anorecal fistula and currently with [facility]. Sees Dr Schilling and Leonel Wilkinson (1/6/23). She is on infliximab. Have communicated with Dr. Schilling regarding her current diagnoses to ask if it would impact timing of treatments and scoping. He will review her chart and get back to me.    LE cellulitis due to skin cracks in feet. Has seen podiatry.    LT Candidacy She is in eval. We discussed MELD, MELD exception.     Labs next week.    RTC 6 m  Jeannette Cervantes MD  Hepatology/Liver Transplant  Medical Director, Liver Transplantation  [facility]  Subjective  Ms. Matute is a 58 Y F with alcohol related cirrhosis and new dx HCC.     She underwent laparoscopic MW ablation of her HCC with Dr. Saavedra on 8/29/23. She is feeling bloated that is constant ever since MWA. She is weighing herself and this seems constant.    She is wondering when she should get labs. Last  were were 8/6/23    Diagnosis of liver disease was made in 2019 when she was having colon evaluations with a diagnosis of perianal Crohn's and her elevated liver tests were addressed.    Admitted [facility] 4/21/20 for acute on chronic cholecystitis, underwent ERCP with cystic stent, pancreatic stent. AXR 5/20/20 showed cystic duct stent with migration.     She was in ED 12/31/22 for leg pain and chest pain. Diagnosis was cellulitis. As part of the workup she had CT chest and incidental lesion in liver was seen.     MRI 1/19/23   There is approximately 3.2 x 2.9 cm arterial enhancing lesion in  hepatic segment 7 along the right hemidiaphragm, with evidence of  washout and pseudocapsule, consistent with OPTN/LIRADS class 5B  Lesion      Liver biopsy 4/24/19 read by Dr. Ger Montoya  1. Steatohepatitis     a. Moderate steatosis (35%, mixed macro and microvesicular, non-zonal)     b. Mild necroinflammatory activity (grade 1 of 3)     c. Well-developed micronodular cirrhosis (stage 4 of 4)  2. Negative for autoimmune hepatitis or other intercurrent liver disease   3. See comment    A) Needle biopsy of liver provides an adequate sample, 1.8 cm in aggregate length, with approximately nine portal areas present. Bile ducts are intact. Well-developed micronodular cirrhosis is associated with overt steatohepatitis. Prominent Linh's hyaline is present, somewhat suggestive of alcoholic etiology. There is no significant lymphoplasma cellular infiltrate and no interface hepatitis to support diagnosis of autoimmune hepatitis. Trichrome stain confirms micronodular cirrhosis. An iron stain is negative.    Reviewed with Dr. Cardona.   53-year-old woman presents with abnormal liver tests and cross sectional CT suggestive of cirrhosis. The patient has been diagnosed with portal hypertension and has anal changes clinically suggestive of Crohn's disease. Serum chemistries include AST 99, ALT 65, alkaline phosphatase 151, total  bilirubin 2.4, mildly elevated IgG and IgM and elevated F-actin at 28. Antinuclear antibody is negative.     AUD  Reports last alcohol around 6/2022. Has an NA beer on occasion.  Alcohol pattern had been 3-4 times per week, 2-6 beers depending on activities.   No CD treatment in past, no DWI. She is engage in CD treatment. Probably end around November.  PETH 1/20/23 was 59    Ascites  Para 10/9/19 3700 ml  This has resolved on kathleen 100 and furosemide 40    Crohns  She was started on infliximab 2019. She has perianal disease which Dr. Schilling describes and inactive at the time of his last note 10/4/22. The patient states it is active and she has a Seton.    Risk factors for fatty liver: was obese in the past, typically 200 pounds. No DM. HTN    Liver Function Studies - Recent Labs   Lab Test 01/20/23  0906   PROTTOTAL 8.4*   ALBUMIN 3.8   BILITOTAL 1.5*   ALKPHOS 116*   AST 89*   ALT 58*     CBC RESULTS: Recent Labs   Lab Test 01/20/23  0906   WBC 4.9   RBC 4.19   HGB 15.0   HCT 45.1   *   MCH 35.8*   MCHC 33.3   RDW 13.7             Lab Test 12/16/21  1038   PROTTOTAL 7.7   ALBUMIN 3.1*   BILITOTAL 1.3   ALKPHOS 111   AST 57*   ALT 49     Lab Test 12/16/21  1038   WBC 3.6*   RBC 4.04   HGB 14.1   HCT 41.5   *   MCH 34.9*   MCHC 34.0   RDW 13.1   PLT 98*       MELD 3.0: 13 at 7/7/2023  9:12 AM  MELD-Na: 10 at 7/7/2023  9:12 AM  Calculated from:  Serum Creatinine: 0.70 mg/dL (Using min of 1 mg/dL) at 7/7/2023  9:12 AM  Serum Sodium: 133 mmol/L at 7/7/2023  9:12 AM  Total Bilirubin: 1.4 mg/dL at 7/7/2023  9:12 AM  Serum Albumin: 3.4 g/dL at 7/7/2023  9:12 AM  INR(ratio): 1.16 at 7/7/2023  9:12 AM  Age at listing (hypothetical): 58 years  Sex: Female at 7/7/2023  9:12 AM      HCV neg  HBV neg  JILL neg  Factin 28  AMA neg  Iron panel sat 59, ferritin 349  No HFE testing  Liver biopsy c/w alcoholic liver disease    Past Medical History  Past Medical History:   Diagnosis Date     Alcohol abuse       Alcoholic cirrhosis of liver with ascites      Anal fistula      Atypical ductal hyperplasia of breast 09/10/2010    ERT not recommended -left - and flat epithelial atypia-scheduled for breast biopsy 9/17/2010      Bifid uvula      Cholelithiasis      Contact perianal dermatitis and other eczema     recurrent - clobetasol      Crohn disease      Fear of flying      - gets Ativan prn.      HCC (hepatocellular carcinoma) (H) 2/1/2023     Hypertension      IBS (irritable bowel syndrome)      Social History  Social History     Socioeconomic History     Marital status:      Spouse name: Albino     Number of children: 3     Years of education: 14     Highest education level: Not on file   Occupational History     Occupation: unemployed   Tobacco Use     Smoking status: Never     Passive exposure: Never     Smokeless tobacco: Never   Vaping Use     Vaping Use: Never used   Substance and Sexual Activity     Alcohol use: Not Currently     Drug use: No     Sexual activity: Yes     Partners: Male     Birth control/protection: Post-menopausal     Comment: chazb had vasectomy   Other Topics Concern      Service No     Blood Transfusions No     Caffeine Concern No     Comment: rarely drinks caffeine     Occupational Exposure Not Asked     Hobby Hazards Not Asked     Sleep Concern Not Asked     Stress Concern Not Asked     Weight Concern Not Asked     Special Diet Not Asked     Back Care Not Asked     Exercise Yes     Comment: does a lot of walking - 4x/week     Bike Helmet Not Asked     Seat Belt Yes     Comment: always     Self-Exams Yes     Comment: SBE encouraged monthly     Parent/sibling w/ CABG, MI or angioplasty before 65F 55M? Yes   Social History Narrative    calcium - drinks 5-6 large glasses skim milk/day    flex sig/colonoscopy -at age 49    sun precautions - discussed    mammogram - needs every 2 years in her 30's, then yearly from then on    Td booster - 9/99 and 4/27/2010    pneumovax -at age 59    DEXA  -when perimenopausal    stool hemoccults - every year after age 40    ASA- start at age 40    mulvitamin - encouraged     Social Determinants of Health     Financial Resource Strain: Low Risk  (9/15/2020)    Overall Financial Resource Strain (CARDIA)      Difficulty of Paying Living Expenses: Not very hard   Food Insecurity: No Food Insecurity (9/15/2020)    Hunger Vital Sign      Worried About Running Out of Food in the Last Year: Never true      Ran Out of Food in the Last Year: Never true   Transportation Needs: No Transportation Needs (9/15/2020)    PRAPARE - Transportation      Lack of Transportation (Medical): No      Lack of Transportation (Non-Medical): No   Physical Activity: Unknown (9/15/2020)    Exercise Vital Sign      Days of Exercise per Week: 0 days      Minutes of Exercise per Session: Not on file   Stress: Stress Concern Present (9/15/2020)    [employer] - Occupational Stress Questionnaire      Feeling of Stress : Rather much   Social Connections: Unknown (9/15/2020)    Social Connection and Isolation Panel [NHANES]      Frequency of Communication with Friends and Family: More than three times a week      Frequency of Social Gatherings with Friends and Family: More than three times a week      Attends Nondenominational Services: Not on file      Active Member of Clubs or Organizations: Not on file      Attends Club or Organization Meetings: Not on file      Marital Status: Not on file   Intimate Partner Violence: Not on file   Housing Stability: Not on file   Not currently working since January 2019. Got laid off and hasn't returned due to health.     Family History  Family History   Problem Relation Age of Onset     Breast Cancer Mother      Gastrointestinal Disease Mother      Heart Disease Father 57     Hypertension Maternal Grandmother      Substance Abuse Maternal Grandfather      Diabetes Maternal Grandfather      Diabetes Paternal Grandmother      Heart Disease Paternal  Grandfather      Cancer Sister      Skin Cancer Sister      Substance Abuse Maternal Uncle      Substance Abuse Maternal Uncle      Suicide Niece      Suicide Niece      Anesthesia Reaction No family hx of      Thrombosis No family hx of    F  from heart disease    ROS 10 point ROS neg other than the symptoms noted above in the HPI.  Exam  Gen Alert pleasant NAD  Resp No difficulty breathing. No cough  Skin No Jaundice  Eyes No icterus  Neuro HERRERA  MSK no muscle wasting  Psyche Pleasant, appropriate. Well groomed.    Abiola Matute is a 58 year old female who is being evaluated via a billable video visit.    Video-Visit Details  Type of service:  Video Visit  Video Start Time: 947  Video End Time: 1010  Originating Location (pt. Location):home  Distant Location (provider location):  [facility] [place]      Platform used for Video Visit: MovieLaLa or InPronto

## 2023-09-15 NOTE — LETTER
9/15/2023         RE: Abiola Matute  [address] [address]        Dear Colleague,    Thank you for referring your patient, Abiola Matute, to the [facility] HEPATOLOGY [facility] [place]. Please see a copy of my visit note below.    [facility] [place]  LIVER CLINIC FOLLOW UP    A/P  Ms. Matute is a 58 Y F with DUNN and alcohol related cirrhosis and new dx HCC. She is undergoing LT evaluation. Last alcohol reported was 8. MELD 9 ABO A    HCC 3.2 cm seg 7. . She has been discussed at tumor conference and has seen IR and oncology as well as Dr. Saavedra. She is post MWA 8/29/23. Recent MRI looked like treatment was as expected    Cholecystitis Cystic duct stent placed in 2020. Follow up imaging showed expected migration. No issues since then.  Variceal screening  No varices on EGD 2019. No varices 5/11/23. Due 3 years  Thrombocytopenia 2/2 ETOH and cirrhosis. Plt around 100.    Ascites Controlled with diuretics. Discussed this and low Na. [place] 100 mg and furosemide 40. Renewed.With bloating sensation, june get US. COuld be 2/2 laparoscopic MWA  AUD positive PETH 1/20/23. Low positive. States last alcohol around 6/2023. In CD treatment now.. Discussed that absolute abstinence is required.  Bone health DEXA showed osteopenia. Taking ca and D. DEXA Due 2022  Crohns With chronic anorecal fistula and currently with [facility]. Sees Dr Schilling and Leonel Wilkinson (1/6/23). She is on infliximab. Have communicated with Dr. Schilling regarding her current diagnoses to ask if it would impact timing of treatments and scoping. He will review her chart and get back to me.    LE cellulitis due to skin cracks in feet. Has seen podiatry.    LT Candidacy She is in eval. We discussed MELD, MELD exception.     Labs next week.    RTC 6 m  Jeannette Cervantes MD  Hepatology/Liver Transplant  Medical Director, Liver Transplantation  [facility]  Subjective  Ms. Matute is a 58 Y F with alcohol  related cirrhosis and new dx HCC.     She underwent laparoscopic MW ablation of her HCC with Dr. Saavedra on 8/29/23. She is feeling bloated that is constant ever since MWA. She is weighing herself and this seems constant.    She is wondering when she should get labs. Last were were 8/6/23    Diagnosis of liver disease was made in 2019 when she was having colon evaluations with a diagnosis of perianal Crohn's and her elevated liver tests were addressed.    Admitted [facility] 4/21/20 for acute on chronic cholecystitis, underwent ERCP with cystic stent, pancreatic stent. AXR 5/20/20 showed cystic duct stent with migration.     She was in ED 12/31/22 for leg pain and chest pain. Diagnosis was cellulitis. As part of the workup she had CT chest and incidental lesion in liver was seen.     MRI 1/19/23   There is approximately 3.2 x 2.9 cm arterial enhancing lesion in  hepatic segment 7 along the right hemidiaphragm, with evidence of  washout and pseudocapsule, consistent with OPTN/LIRADS class 5B  Lesion      Liver biopsy 4/24/19 read by Dr. Ger Montoya  1. Steatohepatitis     a. Moderate steatosis (35%, mixed macro and microvesicular, non-zonal)     b. Mild necroinflammatory activity (grade 1 of 3)     c. Well-developed micronodular cirrhosis (stage 4 of 4)  2. Negative for autoimmune hepatitis or other intercurrent liver disease   3. See comment    A) Needle biopsy of liver provides an adequate sample, 1.8 cm in aggregate length, with approximately nine portal areas present. Bile ducts are intact. Well-developed micronodular cirrhosis is associated with overt steatohepatitis. Prominent Linh's hyaline is present, somewhat suggestive of alcoholic etiology. There is no significant lymphoplasma cellular infiltrate and no interface hepatitis to support diagnosis of autoimmune hepatitis. Trichrome stain confirms micronodular cirrhosis. An iron stain is negative.    Reviewed with Dr. Cardona.   53-year-old woman presents  with abnormal liver tests and cross sectional CT suggestive of cirrhosis. The patient has been diagnosed with portal hypertension and has anal changes clinically suggestive of Crohn's disease. Serum chemistries include AST 99, ALT 65, alkaline phosphatase 151, total bilirubin 2.4, mildly elevated IgG and IgM and elevated F-actin at 28. Antinuclear antibody is negative.     AUD  Reports last alcohol around 6/2022. Has an NA beer on occasion.  Alcohol pattern had been 3-4 times per week, 2-6 beers depending on activities.   No CD treatment in past, no DWI. She is engage in CD treatment. Probably end around November.  PETH 1/20/23 was 59    Ascites  Para 10/9/19 3700 ml  This has resolved on kathleen 100 and furosemide 40    Crohns  She was started on infliximab 2019. She has perianal disease which Dr. Schilling describes and inactive at the time of his last note 10/4/22. The patient states it is active and she has a Seton.    Risk factors for fatty liver: was obese in the past, typically 200 pounds. No DM. HTN    Liver Function Studies - Recent Labs   Lab Test 01/20/23  0906   PROTTOTAL 8.4*   ALBUMIN 3.8   BILITOTAL 1.5*   ALKPHOS 116*   AST 89*   ALT 58*     CBC RESULTS: Recent Labs   Lab Test 01/20/23  0906   WBC 4.9   RBC 4.19   HGB 15.0   HCT 45.1   *   MCH 35.8*   MCHC 33.3   RDW 13.7             Lab Test 12/16/21  1038   PROTTOTAL 7.7   ALBUMIN 3.1*   BILITOTAL 1.3   ALKPHOS 111   AST 57*   ALT 49     Lab Test 12/16/21  1038   WBC 3.6*   RBC 4.04   HGB 14.1   HCT 41.5   *   MCH 34.9*   MCHC 34.0   RDW 13.1   PLT 98*       MELD 3.0: 13 at 7/7/2023  9:12 AM  MELD-Na: 10 at 7/7/2023  9:12 AM  Calculated from:  Serum Creatinine: 0.70 mg/dL (Using min of 1 mg/dL) at 7/7/2023  9:12 AM  Serum Sodium: 133 mmol/L at 7/7/2023  9:12 AM  Total Bilirubin: 1.4 mg/dL at 7/7/2023  9:12 AM  Serum Albumin: 3.4 g/dL at 7/7/2023  9:12 AM  INR(ratio): 1.16 at 7/7/2023  9:12 AM  Age at listing (hypothetical): 58  years  Sex: Female at 7/7/2023  9:12 AM      HCV neg  HBV neg  JILL neg  Factin 28  AMA neg  Iron panel sat 59, ferritin 349  No HFE testing  Liver biopsy c/w alcoholic liver disease    Past Medical History  Past Medical History:   Diagnosis Date    Alcohol abuse     Alcoholic cirrhosis of liver with ascites     Anal fistula     Atypical ductal hyperplasia of breast 09/10/2010    ERT not recommended -left - and flat epithelial atypia-scheduled for breast biopsy 9/17/2010     Bifid uvula     Cholelithiasis     Contact perianal dermatitis and other eczema     recurrent - clobetasol     Crohn disease     Fear of flying      - gets Ativan prn.     HCC (hepatocellular carcinoma) (H) 2/1/2023    Hypertension     IBS (irritable bowel syndrome)      Social History  Social History     Socioeconomic History    Marital status:      Spouse name: Albino    Number of children: 3    Years of education: 14    Highest education level: Not on file   Occupational History    Occupation: unemployed   Tobacco Use    Smoking status: Never     Passive exposure: Never    Smokeless tobacco: Never   Vaping Use    Vaping Use: Never used   Substance and Sexual Activity    Alcohol use: Not Currently    Drug use: No    Sexual activity: Yes     Partners: Male     Birth control/protection: Post-menopausal     Comment: husb had vasectomy   Other Topics Concern     Service No    Blood Transfusions No    Caffeine Concern No     Comment: rarely drinks caffeine    Occupational Exposure Not Asked    Hobby Hazards Not Asked    Sleep Concern Not Asked    Stress Concern Not Asked    Weight Concern Not Asked    Special Diet Not Asked    Back Care Not Asked    Exercise Yes     Comment: does a lot of walking - 4x/week    Bike Helmet Not Asked    Seat Belt Yes     Comment: always    Self-Exams Yes     Comment: SBE encouraged monthly    Parent/sibling w/ CABG, MI or angioplasty before 65F 55M? Yes   Social History Narrative    calcium - drinks 5-6  large glasses skim milk/day    flex sig/colonoscopy -at age 49    sun precautions - discussed    mammogram - needs every 2 years in her 30's, then yearly from then on    Td booster - 9/99 and 4/27/2010    pneumovax -at age 59    DEXA -when perimenopausal    stool hemoccults - every year after age 40    ASA- start at age 40    mulvitamin - encouraged     Social Determinants of Health     Financial Resource Strain: Low Risk  (9/15/2020)    Overall Financial Resource Strain (CARDIA)     Difficulty of Paying Living Expenses: Not very hard   Food Insecurity: No Food Insecurity (9/15/2020)    Hunger Vital Sign     Worried About Running Out of Food in the Last Year: Never true     Ran Out of Food in the Last Year: Never true   Transportation Needs: No Transportation Needs (9/15/2020)    PRAPARE - Transportation     Lack of Transportation (Medical): No     Lack of Transportation (Non-Medical): No   Physical Activity: Unknown (9/15/2020)    Exercise Vital Sign     Days of Exercise per Week: 0 days     Minutes of Exercise per Session: Not on file   Stress: Stress Concern Present (9/15/2020)    [place] [employer] - Occupational Stress Questionnaire     Feeling of Stress : Rather much   Social Connections: Unknown (9/15/2020)    Social Connection and Isolation Panel [NHANES]     Frequency of Communication with Friends and Family: More than three times a week     Frequency of Social Gatherings with Friends and Family: More than three times a week     Attends [employer] Services: Not on file     Active Member of Clubs or Organizations: Not on file     Attends Club or Organization Meetings: Not on file     Marital Status: Not on file   Intimate Partner Violence: Not on file   Housing Stability: Not on file   Not currently working since January 2019. Got laid off and hasn't returned due to health.     Family History  Family History   Problem Relation Age of Onset    Breast Cancer Mother     Gastrointestinal  Disease Mother     Heart Disease Father 57    Hypertension Maternal Grandmother     Substance Abuse Maternal Grandfather     Diabetes Maternal Grandfather     Diabetes Paternal Grandmother     Heart Disease Paternal Grandfather     Cancer Sister     Skin Cancer Sister     Substance Abuse Maternal Uncle     Substance Abuse Maternal Uncle     Suicide Niece     Suicide Niece     Anesthesia Reaction No family hx of     Thrombosis No family hx of    F  from heart disease    ROS 10 point ROS neg other than the symptoms noted above in the HPI.  Exam  Gen Alert pleasant NAD  Resp No difficulty breathing. No cough  Skin No Jaundice  Eyes No icterus  Neuro HERRERA  MSK no muscle wasting  Psyche Pleasant, appropriate. Well groomed.    Abiola Matute is a 58 year old female who is being evaluated via a billable video visit.    Video-Visit Details  Type of service:  Video Visit  Video Start Time: 947  Video End Time: 1010  Originating Location (pt. Location):home  Distant Location (provider location):  [facility]PAT[facility] [place]      Platform used for Video Visit: Ubequity or Wimdu            Again, thank you for allowing me to participate in the care of your patient.        Sincerely,        Jeannette Cervantes MD

## 2023-09-15 NOTE — PROGRESS NOTES
"Virtual Visit Details    Type of service:  Video Visit     Originating Location (pt. Location): {video visit patient location:892490::\"Home\"}  {PROVIDER LOCATION On-site should be selected for visits conducted from your clinic location or adjoining [facility] hospital, academic office, or other nearby [facility] building. Off-site should be selected for all other provider locations, including home:727[address]}  Distant Location (provider location):  {virtual location provider:583249}  Platform used for Video Visit: {Virtual Visit Platforms:805211::\"Health Plotter\"}    "

## 2023-09-18 ENCOUNTER — TELEPHONE (OUTPATIENT)
Dept: GASTROENTEROLOGY | Facility: CLINIC | Age: 58
End: 2023-09-18
Payer: COMMERCIAL

## 2023-09-18 ENCOUNTER — HOSPITAL ENCOUNTER (OUTPATIENT)
Dept: BEHAVIORAL HEALTH | Facility: CLINIC | Age: 58
Discharge: HOME | End: 2023-09-18
Attending: FAMILY MEDICINE
Payer: COMMERCIAL

## 2023-09-18 PROCEDURE — H2035 A/D TX PROGRAM, PER HOUR: HCPCS | Mod: HQ

## 2023-09-18 NOTE — TELEPHONE ENCOUNTER
September 18, 2023    Called [place], Left message and contact info to return call regarding: delay infusion     September 18, 2023    Called Braulio at [facility], Left message and contact info to return call regarding: infusion delay request

## 2023-09-18 NOTE — TELEPHONE ENCOUNTER
[facility] Call Center    Phone Message    May a detailed message be left on voicemail: yes     Reason for Call: Other: Patient wants to delay Remicade infusion from 9/13 to 9/21 as she was inpatient.  Verbal order to delay needed.   Patient wants to delay one more week.     Action Taken: Message routed to:  [facility] GI    Travel Screening: Not Applicable

## 2023-09-18 NOTE — GROUP NOTE
"Group Therapy Documentation    PATIENT'S NAME: Abiola Matute  MRN:   [id number]  :   1965  ACCT. NUMBER: [id number]  DATE OF SERVICE: 23  START TIME: 12:00 PM  END TIME:  2:00 PM  FACILITATOR(S): Judi Dodd LADC  TOPIC: BEH Group Therapy  Number of patients attending the group:  6  Group Length:  2 Hours    Group Therapy Type: Addiction and Psychoeducation    Summary of Group / Topics Discussed:    Boundaries, Cognitive Therapy Techniques, Choices in Recovery, Grief & Loss, Relaxation Techniques, and Stress Management      Gr  Attestation: Dr. De La Rosa - Provides oversight and supervision of care.     We checked in on logistics and any updates from last week and anything needed to discuss.   Clients read  from daily meditation book and said one thing they are reminded of for self care .  We discussed Resource, [employer]\"  a local Print which comes out each two months.  It has articles and resources about recovery, renewal and growth. I encouraged them to read through some of the articles, and I emphasized some resources I thought would be helpful.  Clients  checked in on all dimensions for wkly check in.  We discussed some different things a couple new clients had questions about, regarding \"when does  it get easier not to drink\"    Summary of Group / Topics Discussed:    Psychoeducation/Skills Relapse Prevention (RENEE)  This topic will give a general overview of basic relapse prevention, including definitions of warning signs, triggers and cravings and the importance of addressing healthy coping skills for ongoing relapse prevention.    Objective(s):  Patients will identify 4 key warning signs and triggers  Patients will identify 4 healthy coping mechanisms         Structure (modalities, homework, worksheets, etc.):  Provide psychoeducation on relapse prevention and healthy coping methods.  Facilitate group discussion around each patient s current awareness of warning signs,  triggers " "and cravings.     Expected therapeutic outcome(s):    Patient will:  Be able to manage triggers and cravings without returning to substance use.      Therapeutic outcomes measured by:  Patients will name 4 of their warning signs and triggers  Patients will name 4 healthy coping mechanisms for dealing with their warning signs and triggers      Group Attendance:  Attended group session    Patient's response to the group topic/interactions:  cooperative with task, discussed personal experience with topic, and expressed readiness to alter behaviors    Patient appeared to be Actively participating and Attentive.        Client specific details: [place] discussed that she was not aware before this surgery that chances are high she will still need a transplant someday.  She said she went through a range of emotions, as she was not prepared for this. \"I had to go through what I did, and now feel better about keeping my life on the positive and 'I will deal with that bridge when I get to it\".  Group expressed empathy for all she has been through and also her positive attitude of gratitude.  Today's session focussed on check in of all dimensions and  dim 5 completion of identifying relapse triggers and cues assignment  Patient was able to  name 4 of their warning signs and triggers, including:  Too much time with others whom don't like to stop drinking, concerts/shows if I don't have a plan  Patient was able to name 4 healthy coping mechanisms for dealing with their warning signs and triggers: including: making a plan of structure      Dim1: ASHISH: no change reported   Dim2: medical issues or appts: see above  Dim 3: \"no thoughts of self harm or concerns\"  on likert scale 1-low and 10 -high:   Anxiety: 1  stress: 1  depression 1  issues to discuss and helpful coping: talking with my care team, using breath work. Talking to my   Dim 4 on likert scale 1-low and 10 -high:  10 on  motivation for sobriety  Dim 5: cravings, warning " signs, triggers: see above  Dim 6 sober support motivation:  5, but I did make a call to a friend who has been sober 13 years and we are going to meet in a few weeks    I reminded clients to use compassion and kindness with self and to practice their  self care    P)  use your self care reminder.     Attestation:  Dr. De La Rosa - Provides oversight and supervision of care.  Judi Dodd, MS, [facility], LPC

## 2023-09-18 NOTE — PROGRESS NOTES
[facility] Weekly Treatment Plan Review      Attestation:   Dr. De La Rosa - Medical Director - Provides oversight and supervision of care.        Date Span: Monday: 23 through 23     Date     Group Therapy 2 hours Completed these sessions 2 hours   0 hours 0 hours       Individual Therapy                Family Therapy                 Psychoeducation                 Other (Specify)                 Client had no absences this week     Wk o f  Virginia is excused this week due to holiday and to recovering from her surgery last week.  All notes below are based on last attendance and will be updated when client returns next week    Clients individual sessions noted below. Client had 1 excused absence this week, due to having surgery.    Client has no absences this week    Client has one excused absence this week , due to medical appts.  Client excused from group , due to medical appointment.     Program Running Totals: (No Phase 1 IOP due to this program being only OP level of care)  Total # of Phase 2 OP Group Sessions: 31 for 62  Virginia gets 60 sessions for 120 hours at this time.  She is at 39  Counting her individual she has used 30  sessions and 53 hours  Total # of Phase 3 OP Recovery Management Group Sessions: None    Total # of 1:1 Sessions:  1 hour BALWINDER   1 hour 5/30  7/3,                                     Darinel , , ( & -Onelia)                    Weekly Treatment Plan Review     Treatment Plan initiated on: .    Dimension1: Acute Intoxication/Withdrawal Potential -   Previous Dimension Ratin  Current Dimension Ratin  Date of Last Use 22  Any reports of withdrawal symptoms - No      Dimension 2: Biomedical Conditions & Complications -   Previous Dimension Ratin  Current Dimension Ratin    Medical Concerns: wk of   Virginia discussed that she was not aware before this  "surgery that chances are high she will still need a transplant someday.  She said she went through a range of emotions, as she was not prepared for this. \"I had to go through what I did, and now feel better about keeping my life on the positive and 'I will deal with that bridge when I get to it\".  Group expressed empathy for all she has been through and also her positive attitude of gratitude.    I told her Celia,  on LT asked her time frame to be done, since Virginia just had surgery and there is no issue on the horizon about getting a new liver, I wanted to make sure I was understanding so as to know if Virginia and LT are good with normal schedule, which would have her completing about the end of November. Virginia met with Dr. Saavedra today and he said as far as anyone could ever see there is no thought or idea she will have a transplant.  Sometimes a person needs a new liver years from now, but things are looking good at this time.  Virginia said she has nothing special she wants to work on \"I keep learning from all that we do and I am sure building bonds with the others\"     wk of 9/11 \"no concerns, continuing to recovery from surgery\"  Surgery 8/29 for her cancer  /821/2023 Reports having no new or worsening health conditions. Reports having an appointment this week on 8/23/2023 with her Cardiologist.     wk of 8/14 No concerns I did  my pre op eval for my upcoming surgery with Dr. Bear and nurse Sakina Glass  wk of 8/7 No new concerns  wk of 7/31:  no, just good news that they may be able to remove all my tumore and I may not need a liver.  WK of 7/24-Client has an appointment with surgeon regarding increase in tumor. She is excused from programming due time conflict.  wk of 7/17 She had appt with radiology on 7/14, continues to work with her team due to LT, cancer and rash.  Virginia told group she is dealing with some news, medically, but working with her Drs.  She said she isn't wanting to share a whole lot, " "until she gethers her information, but she did want group to know she has support and appreciates just being able to share what she did  wk 7/10Jasmyn reports rash and working with , also had CT and MRI related to ongoing care for cancer and need for liver.  wk of 7/3  I get an infusion 1x per month and usually by week 3, I feel more sore and more tired, but I am grateful for the infusions.  \"no concerns beyond ongoing liver cancer and Crohns\"  wk of 6/12 She saw Jazmin Barrios at Urgent care for skin issues.  She also said she met with her GI practioner  She reports having liver cancer and Crohn's disease.  \"right now I am doing pretty well  Outside medical appointments this week (list provider and reason for visit)on 9/15 medical appt with Jeannette Cervantes, gastroenterologist   8/29 Surgery with care team for cancer  wk of 8/14 No concerns I have my pre op eval with Dr. Glass, for my upcoming surgery   Wk of 8/7 No appts this week   wk of 7/31: no appts.  wk of 7/24 she saw surgeon Junior Bonilla, she is dealing with her liver tumor  wk of 7/17 She had appt with radiology, Dr. Shah,on 7/14, continues to work with her team due to LT, cancer and rash  : wk of 7/6 she saw Susanna RAZA for rash on leg.  Had a PeTH test 7/7, see below. No issues  : 6/13 she saw Jazmin Meng PA-C for a flare up of her psoriasis likely related to her other medical issues, per client     Current Medications & Medication Changes:  Current Outpatient Medications   Medication    ammonium lactate (AMLACTIN) 12 % external cream    clobetasol (TEMOVATE) 0.05 % external cream    furosemide (LASIX) 40 MG tablet    inFLIXimab (REMICADE) 100 MG injection    Multiple Vitamins-Minerals (MULTIVITAMIN & MINERAL PO)    senna-docusate (SENOKOT-S/PERICOLACE) 8.6-50 MG tablet    spironolactone (ALDACTONE) 100 MG tablet    triamcinolone (KENALOG) 0.1 % external cream    UNABLE TO FIND     No current facility-administered medications for this " encounter.     Facility-Administered Medications Ordered in Other Encounters   Medication    BREEZA Lemon-Lime flavored oral liquid for Neutral Abdominal/Pelvic Imaging 1,000 mL    hyoscyamine (LEVSIN/SL) sublingual tablet 125 mcg     Medication Prescriber:  Linda Macdonald  Taking meds as prescribed? Yes  Medication side effects or concerns:  no        Dimension 3: Emotional/Behavioral Conditions & Complications -   Previous Dimension Ratin  Current Dimension Ratin  PHQ2:       9/15/2023     9:12 AM 2023     5:00 PM 2023     8:00 AM 2023     1:34 PM 2023    10:00 AM 3/28/2023    11:21 AM 3/26/2023     1:45 PM   PHQ-2 (  [employer])   Q1: Little interest or pleasure in doing things 0 0 0    0 0 0 0 0   Q2: Feeling down, depressed or hopeless 0 0 0    0 0 0 0 0   PHQ-2 Score 0 0 0    0 0 0 0 0   Q1: Little interest or pleasure in doing things   Not at all   Not at all    Q2: Feeling down, depressed or hopeless   Not at all   Not at all    PHQ-2 Score   0   0       GAD2:       2023     9:54 AM 2023    10:00 AM 2023     8:00 AM 2023     5:00 PM   TAINA-2   Feeling nervous, anxious, or on edge 1 1 0    0    0    0    0 1   Not being able to stop or control worrying 1 0 0    0    0    0    0 0   TAINA-2 Total Score 2 1 0    0    0    0    0 1     PROMIS 10-Global Health (all questions and answers displayed):       2023     9:58 AM 2023     7:32 PM 2023    10:00 AM 2023     8:02 AM 2023     5:00 PM   PROMIS 10   In general, would you say your health is: Good Good  Good    In general, would you say your quality of life is: Good Good  Very good    In general, how would you rate your physical health? Fair Fair  Fair    In general, how would you rate your mental health, including your mood and your ability to think? Good Good  Very good    In general, how would you rate your satisfaction with your social activities and relationships? Good Good  Excellent     In general, please rate how well you carry out your usual social activities and roles Good Good  Very good    To what extent are you able to carry out your everyday physical activities such as walking, climbing stairs, carrying groceries, or moving a chair? Completely Completely  Mostly    In the past 7 days, how often have you been bothered by emotional problems such as feeling anxious, depressed, or irritable? Sometimes Rarely  Rarely    In the past 7 days, how would you rate your fatigue on average? Mild Mild  Mild    In the past 7 days, how would you rate your pain on average, where 0 means no pain, and 10 means worst imaginable pain? 3 3  3    In general, would you say your health is: 3 3 3 3    3    3    3    3 3   In general, would you say your quality of life is: 3 3 4 4    4    4    4    4 3   In general, how would you rate your physical health? 2 2 3 2    2    2    2    2 3   In general, how would you rate your mental health, including your mood and your ability to think? 3 3 4 4    4    4    4    4 4   In general, how would you rate your satisfaction with your social activities and relationships? 3 3 5 5    5    5    5    5 4   In general, please rate how well you carry out your usual social activities and roles. (This includes activities at home, at work and in your community, and responsibilities as a parent, child, spouse, employee, friend, etc.) 3 3 5 4    4    4    4    4 4   To what extent are you able to carry out your everyday physical activities such as walking, climbing stairs, carrying groceries, or moving a chair? 5 5 4 4    4    4    4    4 3   In the past 7 days, how often have you been bothered by emotional problems such as feeling anxious, depressed, or irritable? 3 2 2 2    2    2    2    2 2   In the past 7 days, how would you rate your fatigue on average? 2 2 2 2    2    2    2    2 2   In the past 7 days, how would you rate your pain on average, where 0 means no pain, and 10 means  "worst imaginable pain? 3 3 4 3    3    3    3    3 2   Global Mental Health Score 12 13 17 17    17    17    17    17 15   Global Physical Health Score 15 15 14 14    14    14    14    14 14   PROMIS TOTAL - SUBSCORES 27 28 31 31    31    31    31    31 29     Mental health diagnosis none  Date of last SIB:  never  Date of  last SI:  never  Date of last HI: never  Behavioral Targets:  see tx plan  Risk factors:  Client dealing with liver disease/cancer and Crohns, client needs a new liver,   Protective factors:  spirituality, forward/future oriented thinking, safe and stable environment, regular physical activity, living with other people and structured day  Current MH Assignments:  see tx plan      Narrative: wk of 9/18 Today I had Virginia answer 3 questions before doing the daily reading focused on acceptance, as it related to self and to what life presents. We had a long discussion on This.   \"no thoughts of self harm or concerns\"  on likert scale 1-low and 10 -high:   Anxiety: 1  stress: 1  depression 1  issues to discuss and helpful coping: talking with my care team, using breath work. Talking to my .   She said she has not felt like eating much, but the positive of it is she has lost 10 pounds.  Virginia  completed PROMIS, scoring 33. TAINA-2, scoring 0, and PHQ-2, scoring 0.  She said she sometimes has worries in her life, but overall feels blessed to not have a lot of anxiety or depression.   wk of 9/11  : \"no thoughts of self harm or concerns\"  on likert scale 1-low and 10 -high:   Anxiety:  2  stress: 1 depression 0  issues to discuss and helpful coping:  right now resting  wk of 8/28, RR changed to 0 from 1.  Virginia continues to report healthy coping for anxiety symptoms.  She remains optimistic and finds using tools learned are helpful, as is discussion with supportive people.  : \"no thoughts of self harm or concerns\"  on likert scale 1-low and 10 -high:   Anxiety:  7  stress: 1 depression 2     Virginia will be " "having her surgery tomorrow.  She discussed \"anxiety mireya high, yet I am hopeful\"  She said she has done all she can with her health and not drinking and preparing herself mentally.  She said she was glad she had group to attend and gets \"immense support\"  wk of 8/21/2023 She reports her anxiety is down and she experiencing no stress or depression. Client worked on her Dimension Three goals this week as she learned about and discussed the topics of Shame, Stigma and Mindfulness.     wkof 8/14  Ways I can think differently: listen to my critical voice and change my thoughts  Ways I can do things differently:  talk to myself like I would a friend.  \"no thoughts of self harm or concerns\"  on likert scale 1-low and 10 -high:   Anxiety:  0  stress: 0 depression 0  issues to discuss and helpful coping:  doing fun things, attending this group, being sober  wk of 8/7 ratings on likert scale 1-low and 10 -high: Anxiety: 2 stress: 1 depression 2. She participated in discussion on \"change the channel\" when getting into an anxiety loop. \"It is a simple way to do something more productive. Virginia actively participated in discussion on Thought distortions and discussed which ones are the ones she actively wants to focus on for the time being: catastrophising and prediction.  She discussed  two situations where the techniques could be helpful, including: Should's and musts and not setting up unrealistic expectations of how she or life should be.  I want to remind myself \"until someone has walked a mile in my shoes, they don't get to  me and the same goes for me with them.  Virginia actively participated in meditation and said it was good to take that time to be.  \"I liked it\"  wk of 7/31  she participated in psychoeducation on Understanding my anxiety   This topic helped client to gain awareness of thoughts and feelings associated with anxiety and act with awareness when feeling anxiety and use helpful coping.  Client did actively " "participate in group discussions, and was able  to identify anxious thoughts, physical feelings when having anxiety: racing heart, shaking, confusion  \"why me\"  She was able to  identify 2 helpful coping methods: breath, talk with her  who is a comfort, stop the story telling/negativity when aware.  Virginia discussed that her tumor is shrinking and she is \"cautiously optimistic\". She said she wanted to share that she has many feelings associated with this, \"it even could mean I won't need a LT, but that remains to be seen\"  I told Virginia she has been shown how with our spirituality and belief, sometimes things can happen that were never anticipated.  She said \"so true\" I never had an idea that was even a part of my hope\"      Wk of 7/24- client denies thoughts of self-harm. On Likert scall 1-low and 10-high client rates anxiety 1  depression 0 and stress 2. She shared her new diagnosis of tumor growth has impacts stress and anxiety.   wk of 7/17: no thoughts of self harm or concerns\" on likert scale 1-low and 10 -high:   Anxiety:  5  stress: 5 depression 1  issues to discuss and Helpful coping:  All related to my medical, but I have great medical team and family support.  Client said of the reading:I like the ongoing discussions of shame and this reading brought me awareness that I have some to deal with regarding the nature of why many get liver cancer.       wk of 7/10:  She was able to repeat the steps of 5 senses, as a review.   \"no thoughts of self harm or concerns\".  Clients feedback to group member  on likert scale 1-low and 10 -high:   Anxiety:  2  stress: 2  depression 2  issues to discuss and Helpful coping: talking with you guys.  I had a craving that was pretty big this week.  Psychoeducation/Skills The thinking-feeling connection. This topic will address information from the [place] [facility] [employer] about how our thoughts influence our feelings.  Client was able to define automatic thoughts " "and make a connection with some that she has.   She said that she has been continueing to gain awareness of her beliefs vs perceptions vs what is a thought and what is a feeling.  Discussed that reminding herself it is ok to have thoughts and feelings, but they are not facts for other people.    Client participated in lovingkindness meditation and said it was just what she needed today and it that it was helpful and grounding.  \"Thank you for doing it\"    wk of 7/3: Clients participated in 5 senses psychoeducation discussion, participated in guided practice and was able to name 5 parts, and said she feels it will be a helpful tool  \"no thoughts of self harm or concerns\".   I discussed the research on gratitude journals and expressed she has seemed like she has an \"attitude of gratitude\".  She said she feels blessed and hopes she does.  on likert scale 1-low and 10 -high:   Anxiety:  0  stress: 0 depression 0  issues to discuss and Helpful coping: I am learning to say no, and that boundaries are good    wk of 6/26  Attended  skills group with Darinel Au.    She participated in psychoeducation on Core beliefsThis topic will discuss core beliefs, as a lens through which we see life and how that affects our choices.  Virginia said the discussion on core beliefs is something she will spend some time on, as the awareness is helpful.  I told her we will follow up on Wednesday with more that can be helpful when we notice harmful core beliefs.   \"no thoughts of self harm or concerns\"  on likert scale 1-low and 10 -high:   Anxiety:  1 stress: 1 depression 0  issues to discuss and Helpful coping: learning about self, anxiety and stress  wk of 6/19 \"no thoughts of self harm or no concerns\".  She reports on likert scale 1-low and 10 -high:   Anxiety:  1  stress: 1 depression 0  issues to discuss and Helpful coping: getting to know people better in group, hearing other's helpful coping.  Attended  skills group with Darinel " "Angely.  We discussed anticipating some situations and \"gearing up\" by practicing deep breathing. I reminded client of previous discussions where beliefs often guide how we feel about certain situations, but that we can reframe them, or look at them for the particular situation.      Wk of   I am feeling much better and less anxiety about group attendance as I continue to attend and get to know others. \"no thoughts of self harm or concerns\". On likert scale 1-low and 10 -high:   Anxiety:  1  stress: 1 depression 1  issues to discuss and Helpful coping:  Breathing, getting to others in group  Wk of  \"no thoughts of self harm\"  Attended MH skills group with Darinel Angely  Week of 23 :Client reports feeling anxious due to first treatment experience, not knowing what to expect, her  Mother's declining health-needing a placement for higher level of care, spouse recently lost his job-need to obtain COBRA insurance and unable to care for grandchildren because of attending treatment. Client was saw Traci Aguilar and has not seen them in about 6 weeks. To follow up with primary counselor re: seeing psychotherapist or return to [facility].  Client rates the following on a scale of 1 (low) to 10 (high):  Anxiety- 5  Depression -1  Stress- 5 as a result of above concerns.   Client participated in psychotherapy/education on self-care, introduction to relaxation meditation-tensing and relaxing muscles, and sleep hygiene.  Current Mental Health symptoms include: . Active interventions to stabilize mental health symptoms this week :   At SI her PHQ-2 score was 0 and his TAINA-2 score was 1.  PROMISE 10 was  31:  She reports that at this time she is dealing with her mother's living situation and health issues. She reports that her father has passed away and that she is estranged from 1 sister.         Dimension 4: Treatment Acceptance / Resistance -   Previous Dimension Ratin  Current Dimension " "Ratin  RENEE Diagnosis:  Alcohol Use Disorder   303.90 (F10.20) Severe In a controlled environment  Commitment to tx process/Stage of change- contemplation  RENEE assignments - see tx plan      Narrative - wk of   on likert scale 1-low and 10 -high: 10 on motivation for sobriety. She   wk of   on likert scale 1-low and 10 -high: 10   motivation for sobriety 10.  Motivated to continue a life of sobriety, \"I am learning so much about self.  wk of 2023 Client expressed this week about her alcoholism that that she liikes that \"I'm getting better not as shameful.\" That for her it has helped to accept alcoholism, \"occurs as a disease.\"     wk of   Virginia told group \"I know I am an alcoholic\" she said she is so glad to have had to find her way to the group, even if it was through health issues or she would still be doing damage.  motivation for sobriety; 10 she likes being sober and reports she likes learning and attending group.   wk of   motivation for sobriety 10.  Motivation is high to have quality of life  wk of   on likert scale 1-low and 10 -high:  motivation for sobriety: 10   WK of -motivation 10 on Likert scale.she expressed strong commitment to sobriety, see notes dated  for more details.  wk of :  on likert scale 1-low and 10 -high:  motivation for sobriety: 10+  wk of 7/10  My motivation for sobriety is 1010\".  I think it is so important to attend and be dedicated to support in this group, and to \"say things outloud, so it takes the power away, as you say, Alana\"  wk of 7/3  on likert scale 1-low and 10 -high:  motivation for sobriety 10.  Virginia said  session really stuck with her and she thought about a lot of things and is really understanding why attendance and building on the days and lessons is important.     wk of   sober support motivation:  2, but I think motivation will grow as I keep settling in and I am motivated 10/10 for sobreity.    Wk of :  " "Client reports motivation for sobriety is 10 (high) Motivation for attending community support 5 (on scale 1 low-10 high).   Virginia expressed to her group how helpful they are to her and how much she appreciates all that members shared about their stories.  She said she gains such helpful insight for self   Week of  Virginia discussed that she has been late a couple times, but realized with 2 other members gone, how this speaks to the importance of being on time and valuing and respecting others time, too.   She will also miss tomorrows group due to a long awaited medical appt. Dim 4: on likert scale 1-low and 10 -high:  Motivation for sobriety:  10  motivation for sober support attendance: not doing yet  Wk of   Virginia also said she finds motivation for sobriety in her family and friends, \"but really deep down need to do this for myself\"  Told counselor and group how she didn't think she would get much out of tx and already is getting so much and is grateful for older adult group   Week of 23: Client reports motivation for sobriety is 10 (high) Motivation for attending community support 5 (on scale 1 low-10 high).  The patient is motivated, to explore treatment.  She said \"of course I am partly here to fulfill requirements for liver transplant, but I want to learn healthy coping for lifelong sobriety.      Dimension 5: Relapse / Continued Problem Potential -   Previous Dimension Ratin  Current Dimension Ratin  Relapses this week - None  Urges to use - None  UA results -   No results found for this or any previous visit (from the past 168 hour(s)).    Using ReSet Clem: N/A    Narrative- wk of   She participated in Psychoeducation/Skills Relapse Prevention (RENEE)  This topic gave a general overview of basic relapse prevention, including definitions of warning signs, triggers and cravings and the importance of addressing healthy coping skills for ongoing relapse prevention.Patient was able to  name 4 " "of their warning signs and triggers, including:  Too much time with others whom don't like to stop drinking, concerts/shows if I don't have a plan  Patient was able to name 4 healthy coping mechanisms for dealing with their warning signs and triggers: including: making a plan of structure  wk of 9/11 Sights, sounds and situations that are triggers : concerts and comedy shows, but support, discussion and a fun NA drink are how I deal with it.   Virginia said each day she is healing more from surgery but is still quite sore.  \"I have had absolutely no cravings or desire to use alcohol\".   Gone week of 9/4 for surgery   wk of 8/28  cravings, warning signs, triggers: none I went to a [place] game and had no cravings.\"   Wk of 8/21/2023 She reports having no cravings to drink this week and having a \"wonderful week.\"       Wk of 8/14   lient discussed a big warning sign for self is thinking I can drink again, especially socially.  II think about  it regularly.  Group discussed that awareness of this is good and it is likely normal to do this.  I encouraged her to continue to discuss this and down the road to keep in mind with sponsor, therapist, etc. And work through it.  I told her it is really good she can say this outloud.   Something I am doing well: having plans of structure, especially when around situations I used to drink in     any cravings, warning signs, triggers: the [place] concert could have been but wasn't .  I have supportive people and that helps, but so does the realization of my use patterns and how I feel better not using  wk of 8/7 [place] said that a trigger for her was [place]. \"I haven't been there since starting tx\"  My  and I just kept walking and he also said \"I got you\".  She said her cravings only lasted a couple minutes.  We discussed how this can be common and some group members said they cannot go to the same places at all because of that kind of trigger.  She said " "\"I get it, and will continue to be aware of situations like this   wk of 7/31   cravings, warning signs, triggers: None this week   WK of 7/24-client denies triggers, cravings or warning signs this past week.  wk of 7/17:  cravings, warning signs, triggers:i went to a musical festival, had a few cravings, but also had insight when watching others use, \"glad it isn't me\"  Wk of 7/10 Had a PeTH test 7/7, see above. No issues. She discussed the Serenity Prayer and that she finds it helpful.  She also discussed having cravings at a 4 of 10 when cooking, and what she found helpful:  Processed, talked with , talk with group today about it.   Virginia said she really had quite a learning week with events in group, discussions and assignments.  \"My heart goes out to the new person the other day, but also I had some anger about all the disruptions and negativity from her\"  \"I am ready to move on, and learn what I learned, and know that could be any one of us\"  wk of 7/5  cravings, warning signs, triggers: none.  My adult kids are caring and asked if I thought I would be OK going to [facility], as there is drinking there.  She told them she appreciates the concern, but feels with them and little to no triggers, for quite some time, she didn't feel it was a problem.  Virginia said she had a wonderful time with her family and is not concerned about use over 4th of July holiday either, she has a good plan for spending time with family and focusing on good food.   wk of 6/26  Dim 5: cravings, warning signs, triggers: none.  Wed reading was focused blame.  It said blaming is hiding, that when we blame others we try to hide our character defects.  We discussed the meaning of character defects. \"I was in big denial for a long time, it took being told I would need a new liver for me to really realize what drinking was doing. \"it hit me like a ton of bricks when I finally really heard it\"  wk of 6/19  She participated in  " "Psychoeducation/Skills: Self-Care.  Client s completed self-care assessment and described self-care as feeling better physically. Client identified physical self-care they do well- attending medical appointments, one they do poorly or needs improvement- eating healthy food and barriers that prevent themselves from self-care - cost of food is expensive, the shopping and prep.  This topic will give a general overview of self-care: physical, environmental, emotional, social and spiritual. It will explore importance of self-care and the impact on recovery.   Wk of . Dim 5: warning signs, triggers and cravings: none   helpful coping: attending group. Examples of benefits of drinking or drug use, or other behaviors Physical, Social mental or emotional: confidence, escape from reality, \"friends\"     Main drawbacks connected to these: not real friends that only want to drink together.  Headaches and slurred speech.  Remoarse  Benefits connected to abstinence from addictive behaviors:more able to do me and be me, I enjoy trud friends, I remember things better and will have better memories      Drawbacks you see connected with abstinence from these:  Wk of she participated in the daily reading and said she liked it.  Virginia also gave meaningful feedback to client who discussed use episode: \"it could be anyone of us\"  She said this is important and she is learning from others sharing.  Week of 23: Client denies riggers or warning signs this past week. She said she liked the saying \"it is just as easy to create good habits as bad ones\"   Patient reports family members and her  are concerned about their substance use.  Patient reports their recovery goals are \"abstinence forever.\"  She has had no previous detoxes or treatments and needs to gain insight into healthy coping for relapse prevention.      Dimension 6: Recovery Environment -   Previous Dimension Ratin  Current Dimension Ratin  Family " "Involvement - not at this time  Summarize attendance at family groups and family sessions - NA  Family supportive of treatment?  Yes    Community support group attendance - no  Recreational activities - some camping, cooking and boating    Narrative - wk of 9/18  sober support motivation:  5, but I did make a call to a friend who has been sober 13 years and we are going to meet in a few weeks .   we discussed focused on self care over the next days and She intends to rest, babysit her grandkids with her  overnight, see her sister whom she has not seen for 4 months.     wk of 9/11 sober support motivation:  5, but I will look into SMART Recovery. She participated in Psychoeducation/Skills Goal Setting (RENEE)  This topic will give a general overview of short, intermediate and long term goals including the value of goal setting. She completed her goal setting worksheet and discussed things that can get in the way of her goal: others that suck energy out of her, old thinking about how nice a drink would be  Plan of structure for weekend: She said she will go camping with family this weekend in their new RV, she also has to rest a lot.  \"I love football and will watch most any game\"  absent wk o f9/4  wk of 8/28:\" I have a great deal going on with my upcoming surgery, will likely look into sober support when things settle down.  Right now my group is support\"  wk of 8/21 Client worked on her dimension 6 goals this week as she learned and discussed more about sober support. She continues to report a low desire to attend outside community based sober support meetings, however she does appear to be open to learning more about it.     wk of 8/14 Virginia discussed that she went to [place] with friends who were drinking, but that they all said they would not need to drink. She said she appreciated that but it really didn't bother her in the least, as she likes being sober. Group discussed this type of situation and some group " "members said it would have been hard for them. Virginia said if it ever is, she will change what she does or ask them not to use. \"I have very dear friends and I know they mean it when they say the don't need to drink around me.  sober support motivation: 5   8/7 \"I am at 5 on wanting to attend community support, but find group support very helpful!  wk of 7/31  sober support motivation:  I have so much going on with medical and this meeting 3x per week, that realistically, I will not likely look into community support until after my surgery at the end of the month.   WK of 7/24-client's support are her family, she is not attending support groups and motivation to attend is 2 on Likert scale. Client shared she has learned this week is this is a continuous journey and staying positive is huge. To support her sobriety this week she will continue to surround herself with good people. She is grateful for home, shelter and stability.  wk of 7/17:  sober support motivation: this group and my family.     wk of 7/10: \"I am motivated for outside support at 2/10, but when I get done with Tuesday group, I think my motivtiaon will be higher\"  wk of 7/5   We discussed \"The power of Solitude\" and how spending time alone with ourselves may not be easy or even desirable, it it is key to getting to know self.  I emphasized that studies tell us we need each other to survive and be happy, but also when we loose the ability to be alone with ourselves, sometimes our overstimulated nervous systems suffer from no place to rest and recharge.  She said this is good to discuss, as she may even consider doing it a bit more, as it \"really does recharge me\" She said she is pretty good about finding alone time and also uses journaling.  I asked client how this applies to recovery and treatment:She said she will often take a topic from group, or a question and journal about.  She said it really helps   Wk of 6/26. She said sober support motivation is " " 2, but I think motivation will grow as I keep settling in  I talked with clients about taking responsibility for our actions, so we can be empowered and own our life and also change if we need to. We discussed resources including AA, SMART Recovery.  I encouraged clients to start exploring meetings.  Wk of 6/19.  Virginia reports with having group 3x per week, it is a good deal of support, \" I will continue to learn about peer support\"  wk of 6/12client discussed resources sober support, including AA, Liver Transplant support and [place] recovery connection  wk of 6/6  goals for the wknd: look into volunteering at [facility] for writing card.  \"no community support attendance at this time\"   Client is not currently attending community sup[ort and does not have a sponsor. Client's supportive people are her family. Client shared she has friends and has not talked with many about her current situation, because \"I am a private person,\" (treatment and needing transplant).  Patient reports they are involved in community of holden activities    They reports spirituality impacts recovery in the following ways:  \"There was some changes as far as .  We do belong to a [employer] but it's been a number of years since we've been.\"  Patient believes her spirituality and sobriety are connected. Patient reported having 3 children, all adults.  Patient has 2 grandchildren.    She lives with her spouse and son. recreational/leisure activities: camping, cooking, boating, playing board games and card games.  Patient is currently disabled due to her Crohn's Disease.  Patient reports their income is obtained through SSDI disability; spouse.  Patient does not identify finances as a current stressor.         Progress made on transition planning goals: just began tx    Justification for Continued Treatment at this Level of Care:  No previous tx, needs 6 months of sobriety to be eligible for a transplant. Recent surgery and she may not need transplant, " "but reports she wants sobriety for her life.  Needs to learn long term relapse prevention, she reports finding that attending group is helpful for her and she is learning.She said she did not think she would get much out of this but is getting so much support and understanding of healthy coping.  She is not attending community support, at this time, \"but I find my group so helpful.  She is working on goals and assignments.  She gets regular PEth  tests, and there have been no indications of alcohol use.    Treatment coordination activities this week: wk 9/18 Celia,  on LT asked her time frame to be done, since Virginia just had surgery and there is no issue on the horizon about getting a new liver   wk 8/28 Reviewed notes from sub Galen Rowland  7/31 reviewed Onelia Singh notes and discussed client with her.  7/18  Discussed client info with sub Onelia Singh   Discussed client info with margaret Singh  Need for peer recovery support referral? No    Discharge Planning: Not at this time      Has vulnerable adult status change? No    Interdisciplinary Clinical Supervision including:  no    Are Treatment Plan goals/objectives effective? Yes  *If no, list changes to treatment plan:    Are the current goals meeting client's needs? Yes  *If no, list the changes to treatment plan.  Plan:  Continue per Master Treatment Plan    *Client agrees with any changes to the treatment plan: Yes  *Client received copy of changes: Yes  *Client is aware of right to access a treatment plan review: Yes     Staff Members Contributing To Weekly Review:      Judi Dodd, MS, LADC, LPC          "

## 2023-09-19 ENCOUNTER — HOSPITAL ENCOUNTER (OUTPATIENT)
Dept: ULTRASOUND IMAGING | Facility: CLINIC | Age: 58
Discharge: HOME | End: 2023-09-19
Attending: INTERNAL MEDICINE
Payer: COMMERCIAL

## 2023-09-19 ENCOUNTER — LAB (OUTPATIENT)
Dept: LAB | Facility: CLINIC | Age: 58
End: 2023-09-19
Attending: INTERNAL MEDICINE
Payer: COMMERCIAL

## 2023-09-19 DIAGNOSIS — C22.0 HCC (HEPATOCELLULAR CARCINOMA) (H): ICD-10-CM

## 2023-09-19 DIAGNOSIS — K70.31 ALCOHOLIC CIRRHOSIS OF LIVER WITH ASCITES (H): ICD-10-CM

## 2023-09-19 DIAGNOSIS — K50.113 CROHN'S DISEASE OF LARGE INTESTINE WITH FISTULA (H): ICD-10-CM

## 2023-09-19 LAB
AFP SERPL-MCNC: 18.3 NG/ML
ALBUMIN SERPL BCG-MCNC: 3.2 G/DL (ref 3.5–5.2)
ALP SERPL-CCNC: 153 U/L (ref 35–104)
ALT SERPL W P-5'-P-CCNC: 20 U/L (ref 0–50)
ANION GAP SERPL CALCULATED.3IONS-SCNC: 10 MMOL/L (ref 7–15)
AST SERPL W P-5'-P-CCNC: 48 U/L (ref 0–45)
BILIRUB DIRECT SERPL-MCNC: 0.76 MG/DL (ref 0–0.3)
BILIRUB SERPL-MCNC: 2.2 MG/DL
BUN SERPL-MCNC: 7.3 MG/DL (ref 8–23)
CALCIUM SERPL-MCNC: 8.6 MG/DL (ref 8.6–10)
CHLORIDE SERPL-SCNC: 100 MMOL/L (ref 98–107)
CREAT SERPL-MCNC: 0.74 MG/DL (ref 0.51–0.95)
CRP SERPL-MCNC: 16.73 MG/L
DEPRECATED HCO3 PLAS-SCNC: 23 MMOL/L (ref 22–29)
EGFRCR SERPLBLD CKD-EPI 2021: >90 ML/MIN/1.73M2
ERYTHROCYTE [DISTWIDTH] IN BLOOD BY AUTOMATED COUNT: 14.6 % (ref 10–15)
ERYTHROCYTE [SEDIMENTATION RATE] IN BLOOD BY WESTERGREN METHOD: 66 MM/HR (ref 0–30)
GLUCOSE SERPL-MCNC: 101 MG/DL (ref 70–99)
HCT VFR BLD AUTO: 36.9 % (ref 35–47)
HGB BLD-MCNC: 12.6 G/DL (ref 11.7–15.7)
INR PPP: 1.21 (ref 0.85–1.15)
MCH RBC QN AUTO: 34.8 PG (ref 26.5–33)
MCHC RBC AUTO-ENTMCNC: 34.1 G/DL (ref 31.5–36.5)
MCV RBC AUTO: 102 FL (ref 78–100)
PLATELET # BLD AUTO: 151 10E3/UL (ref 150–450)
POTASSIUM SERPL-SCNC: 3.8 MMOL/L (ref 3.4–5.3)
PROT SERPL-MCNC: 7.7 G/DL (ref 6.4–8.3)
RBC # BLD AUTO: 3.62 10E6/UL (ref 3.8–5.2)
SODIUM SERPL-SCNC: 133 MMOL/L (ref 136–145)
WBC # BLD AUTO: 5.2 10E3/UL (ref 4–11)

## 2023-09-19 PROCEDURE — 76705 ECHO EXAM OF ABDOMEN: CPT

## 2023-09-19 PROCEDURE — 85610 PROTHROMBIN TIME: CPT

## 2023-09-19 PROCEDURE — 82248 BILIRUBIN DIRECT: CPT

## 2023-09-19 PROCEDURE — 36415 COLL VENOUS BLD VENIPUNCTURE: CPT

## 2023-09-19 PROCEDURE — 85652 RBC SED RATE AUTOMATED: CPT

## 2023-09-19 PROCEDURE — 85027 COMPLETE CBC AUTOMATED: CPT

## 2023-09-19 PROCEDURE — 86140 C-REACTIVE PROTEIN: CPT

## 2023-09-19 PROCEDURE — 80321 ALCOHOLS BIOMARKERS 1OR 2: CPT

## 2023-09-19 PROCEDURE — 82105 ALPHA-FETOPROTEIN SERUM: CPT

## 2023-09-19 PROCEDURE — 80053 COMPREHEN METABOLIC PANEL: CPT

## 2023-09-20 ENCOUNTER — DOCUMENTATION ONLY (OUTPATIENT)
Dept: GASTROENTEROLOGY | Facility: CLINIC | Age: 58
End: 2023-09-20

## 2023-09-20 ENCOUNTER — HOSPITAL ENCOUNTER (OUTPATIENT)
Dept: BEHAVIORAL HEALTH | Facility: CLINIC | Age: 58
Discharge: HOME | End: 2023-09-20
Attending: FAMILY MEDICINE
Payer: COMMERCIAL

## 2023-09-20 ENCOUNTER — MEDICAL CORRESPONDENCE (OUTPATIENT)
Dept: HEALTH INFORMATION MANAGEMENT | Facility: CLINIC | Age: 58
End: 2023-09-20

## 2023-09-20 ENCOUNTER — ONCOLOGY VISIT (OUTPATIENT)
Dept: SURGERY | Facility: CLINIC | Age: 58
End: 2023-09-20
Attending: SURGERY
Payer: COMMERCIAL

## 2023-09-20 VITALS
OXYGEN SATURATION: 98 % | SYSTOLIC BLOOD PRESSURE: 118 MMHG | DIASTOLIC BLOOD PRESSURE: 80 MMHG | RESPIRATION RATE: 16 BRPM | BODY MASS INDEX: 28.25 KG/M2 | HEART RATE: 82 BPM | WEIGHT: 175 LBS

## 2023-09-20 DIAGNOSIS — C22.0 HCC (HEPATOCELLULAR CARCINOMA) (H): Primary | ICD-10-CM

## 2023-09-20 LAB
LABORATORY REPORT: NORMAL
PLPETH BLD-MCNC: <10 NG/ML
POPETH BLD-MCNC: <10 NG/ML

## 2023-09-20 PROCEDURE — H2035 A/D TX PROGRAM, PER HOUR: HCPCS

## 2023-09-20 PROCEDURE — 99024 POSTOP FOLLOW-UP VISIT: CPT | Performed by: SURGERY

## 2023-09-20 PROCEDURE — H2035 A/D TX PROGRAM, PER HOUR: HCPCS | Mod: HQ

## 2023-09-20 ASSESSMENT — PAIN SCALES - GENERAL: PAINLEVEL: MODERATE PAIN (4)

## 2023-09-20 NOTE — GROUP NOTE
"Group Therapy Documentation    PATIENT'S NAME: Abiola Matute  MRN:   [id number]  :   1965  ACCT. NUMBER: [id number]  DATE OF SERVICE: 23  START TIME: 12:00 PM  END TIME:  2:00 PM  FACILITATOR(S): Judi Dodd LADC  TOPIC: BEH Group Therapy  Number of patients attending the group:  4  Group Length:  2 Hours    Group Therapy Type: Addiction    Summary of Group / Topics Discussed:    Balanced Lifestyle , Co-occurring Illness, Choices in Recovery, Distress Tolerance, Mindfulness, Resentments, and Stress Management      Attestation:   Dr. Rj MD - Provides oversight and supervision of care.     Today I had clients answer 3 questions before doing the  daily reading focused on acceptance, as it related to self and to what life presents.  We had a long discussion on This.  I told clients next week we will be doing some work on understanding Radical Acceptance.  Clients completed \"what worries me\" exercise and we discussed helpful coping: I had clients give feedback and practice speaking from the \"I\".  I reminded my clients of my usual saying: feelings are just information giver\".  We had an enlightening discussion about acceptance and each group member shared what they got out of it.  Clients talked about their plan for structure, naming 2-3 things they most want to focus on: We ended group with a short mediation.    Group Attendance:  Attended group session  Patient's response to the group topic/interactions:  cooperative with task, expressed readiness to alter behaviors, expressed understanding of topic, and gave appropriate feedback to peers  Patient appeared to be Actively participating and Attentive.        Clients specifice information :  Today's session focused on dim 3 acceptance and also on worries/anxiety symptoms and healthy coping. It also focused on self care over the next days and She intends to rest, babysit her grandkids with her  overnight, see her sister whom she has not " "seen for 4 months.    2-3 things that are hard to accept about self, life, world, and so on:   \"'m deserving of my  and family, as they are so wonderful.  So many changes in society\"  1-2 things you are finding more acceptance of/feeling more peaceful of:\"Living for each day and I am sober 1 day at a time\"  1-2 things you think would be helpful about more \"acceptance\" of what is:OK not to have to do everything, OK for me to accept help\"  Things that worry me, helpful coping:That I am not the best person I can be, can I help my family like they need, when they need.  Coping: don't get so far ahead, trust I can be there  Client participated in meditation      P)  Follow plan of structure for weekend.   Continue to notice how you talk to self and treat self during a day.     Judi Dodd, MS, LADC, LPC      Attestation:  Dr. Rj CHEN - Provides oversight and supervision of care.     "

## 2023-09-20 NOTE — PROGRESS NOTES
Ms. Matute looks good.  She had a laparoscopic microwave ablation of hepatocellular cancer after an incomplete response to Y90.  She had more pain after surgery than she was expecting and there was some complicating issues with getting her prescription filled because it got late into the evening by the time she recovered from surgery.  She shared her feedback that this was frustrating for her.  Ultimately she did receive a paper prescription which she was able to fill at [facility].  Since surgery she has been recovering well.  Her pain is improving.  She does have some pain with coughing occasionally and also has some feeling of bloating.  MRI shows no evidence of residual tumor.  Only minimal ascites.    I discussed that the scan looks good.  Her ablation looks good with no evidence of residual tumor.  I discussed that she would continue her ongoing follow-up with Dr. Cervantes.  The patient also noted that she was under the impression that ablation would cure her of her hepatocellular cancer and that she would not ever need a liver transplant.  I did clarify for her that while ablation is very good at treating the tumor that we burned she is certainly at risk for recurrent tumors elsewhere in her liver which could lead to the need for transplant in the future.  We have certainly had that discussion in the past but there was clearly a miscommunication. I discussed that it is possible that she will never need a transplant, but it is certainly a potential in the future. She and her  voiced understanding, and I did my best to answer all of their quesstions and concerns.    From a clinical standpoint patient is doing well.  I encouraged her that she will continue to improve back to her normal baseline.  Her surgical incisions have healed without difficulty.  She will continue follow-up with Dr. Cervantes and I will see her on an as-needed basis moving forward. They were happy with that plan.

## 2023-09-20 NOTE — PROGRESS NOTES
Faxed infusion order per request.    Facility Information:  [facility]  Attn: Braulio  [facility]   Yolande Robles RN   Direct Dial #: [phone]   [facility] Fax #: [phone]   E-Mail: [email or web address]      SK

## 2023-09-20 NOTE — PROGRESS NOTES
"Attestation:   Dr. De La Rosa - Medical Director - Provides oversight and supervision of care.   INDIVIDUAL SESSION  Start 11:16  END  11:50      Virginia attended an individual session today. We discussed her health, how she feels she is doing on goals and assignments and the plan going forward.  She feels she is on schedule for goals and assignments and I agreed.  Virginia said she is feeling bloated, even though surgery was 3 weeks ago and has been in touch with her Dr. And has appt. After group today.  She said she has not felt like eating much, but the positive of it is she has lost 10 pounds.  Virginia  completed PROMIS, scoring 33. TAINA-2, scoring 0, and PHQ-2, scoring 0.  She said she sometimes has worries in her life, but overall feels blessed to not have a lot of anxiety or depression.  \"And I feel even better with sobriety\".  I told her Celia,  on LT asked her time frame to be done, since Virginia just had surgery and there is no issue on the horizon about getting a new liver, I wanted to make sure I was understanding so as to know if Virginia and LT are good with normal schedule, which would have her completing about the end of November. Virginia met with Dr. Saavedra today and he said as far as anyone could ever see there is no thought or idea she will have a transplant.  Sometimes a person needs a new liver years from now, but things are looking good at this time.  Virginia said she has nothing special she wants to work on \"I keep learning from all that we do and I am sure building bonds with the others\"      I: PROMISE, TAINA, PHQ and healthy coping.  Encouragement, feedback ,goal planning    A) Accepting of her disease, working closing with Drs on health issues    P) Continue to work with Health Team.  Discuss PH 3 attendance in about 3 weeks    Judi Dodd, MS, LADC, LPC    "

## 2023-09-21 NOTE — TELEPHONE ENCOUNTER
Confirm with Yolande Qureshi RN at [facility] [place], she received re-suming of Therapy plan.

## 2023-09-22 ENCOUNTER — DOCUMENTATION ONLY (OUTPATIENT)
Dept: TRANSPLANT | Facility: CLINIC | Age: 58
End: 2023-09-22
Payer: COMMERCIAL

## 2023-09-22 NOTE — PROGRESS NOTES
[facility] LIVER TUMOR BOARD NOTE     DATE OF TUMOR BOARD: September 22, 2023      SCAN REVIEWED: 9/7/23 MRI, Reviewed by Dr. Dusty Thomas    Discussion:   -Current: post-treatment changes, some normal peripheral enhancement, nothing nodular or suspicious. LR-TR nonviable.   -No new liver lesions.    Plan:  -Continue with surveillance monitoring, list patient when chem dep programming complete.

## 2023-09-25 ENCOUNTER — HOSPITAL ENCOUNTER (OUTPATIENT)
Dept: BEHAVIORAL HEALTH | Facility: CLINIC | Age: 58
Discharge: HOME | End: 2023-09-25
Attending: FAMILY MEDICINE
Payer: COMMERCIAL

## 2023-09-25 PROCEDURE — H2035 A/D TX PROGRAM, PER HOUR: HCPCS | Mod: HQ

## 2023-09-25 NOTE — TELEPHONE ENCOUNTER
September 25, 2023    Called Yolande Left message and contact info to return call regarding: predictr pK

## 2023-09-25 NOTE — PROGRESS NOTES
[facility] Weekly Treatment Plan Review      Attestation:   Dr. De La Rosa - Medical Director - Provides oversight and supervision of care.        Date Span: Monday: 23 through Fuad 10/1/23     Date     Group Therapy 2 hours   2 hours   0 hours 0 hours       Individual Therapy                Family Therapy                 Psychoeducation                 Other (Specify)                   Client had no absences this week     Wk o f  Virginia is excused this week due to holiday and to recovering from her surgery last week.  All notes below are based on last attendance and will be updated when client returns next week    Clients individual sessions noted below. Client had 1 excused absence this week, due to having surgery.    Client has no absences this week    Client has one excused absence this week , due to medical appts.  Client excused from group , due to medical appointment.     Program Running Totals: (No Phase 1 IOP due to this program being only OP level of care)  Total # of Phase 2 OP Group Sessions: 33 for 66  Virginia gets 60 sessions for 120 hours at this time.  Counting her individual she has used 30  sessions and 53 hours  Total # of Phase 3 OP Recovery Management Group Sessions: None    Total # of 1:1 Sessions:  1 hour BALWINDER   1 hour 5/30  7/3,                                     Darinel , , ( & -Onelia)                    Weekly Treatment Plan Review     Treatment Plan initiated on: .    Dimension1: Acute Intoxication/Withdrawal Potential -   Previous Dimension Ratin  Current Dimension Ratin  Date of Last Use 22  Any reports of withdrawal symptoms - No  wk of : Client reports no change in sobriety date and exhibits no sx of intoxication or withdrawal.      Dimension 2: Biomedical Conditions & Complications -   Previous Dimension Ratin  Current Dimension Ratin    Medical Concerns:  "wk of 9/25: Client denies any new or worsening medical issues.  wk of 9/18  Virginia discussed that she was not aware before this surgery that chances are high she will still need a transplant someday.  She said she went through a range of emotions, as she was not prepared for this. \"I had to go through what I did, and now feel better about keeping my life on the positive and 'I will deal with that bridge when I get to it\".  Group expressed empathy for all she has been through and also her positive attitude of gratitude.    I told her Celia,  on LT asked her time frame to be done, since Virginia just had surgery and there is no issue on the horizon about getting a new liver, I wanted to make sure I was understanding so as to know if Virginia and LT are good with normal schedule, which would have her completing about the end of November. Virginia met with Dr. Saavedra today and he said as far as anyone could ever see there is no thought or idea she will have a transplant.  Sometimes a person needs a new liver years from now, but things are looking good at this time.  Virginia said she has nothing special she wants to work on \"I keep learning from all that we do and I am sure building bonds with the others\"     wk of 9/11 \"no concerns, continuing to recovery from surgery\"  Surgery 8/29 for her cancer  /821/2023 Reports having no new or worsening health conditions. Reports having an appointment this week on 8/23/2023 with her Cardiologist.     wk of 8/14 No concerns I did  my pre op eval for my upcoming surgery with Dr. Bear and nurse Sakina Glass  wk of 8/7 No new concerns  wk of 7/31:  no, just good news that they may be able to remove all my tumore and I may not need a liver.  WK of 7/24-Client has an appointment with surgeon regarding increase in tumor. She is excused from programming due time conflict.  wk of 7/17 She had appt with radiology on 7/14, continues to work with her team due to LT, cancer and rash.  Virginia told " "group she is dealing with some news, medically, but working with her Drs.  She said she isn't wanting to share a whole lot, until she gethers her information, but she did want group to know she has support and appreciates just being able to share what she did  wk 7/10Shlauren reports rash and working with , also had CT and MRI related to ongoing care for cancer and need for liver.  wk of 7/3  I get an infusion 1x per month and usually by week 3, I feel more sore and more tired, but I am grateful for the infusions.  \"no concerns beyond ongoing liver cancer and Crohns\"  wk of 6/12 She saw Jazmin Barrios at Urgent care for skin issues.  She also said she met with her GI practioner  She reports having liver cancer and Crohn's disease.  \"right now I am doing pretty well  Outside medical appointments this week (list provider and reason for visit)on 9/15 medical appt with Jeannette Cervantes gastroenterologist   8/29 Surgery with care team for cancer  wk of 8/14 No concerns I have my pre op eval with Dr. Glass, for my upcoming surgery   Wk of 8/7 No appts this week   wk of 7/31: no appts.  wk of 7/24 she saw surgeon Junior Bonilla, she is dealing with her liver tumor  wk of 7/17 She had appt with radiology, Dr. Shah,on 7/14, continues to work with her team due to LT, cancer and rash  : wk of 7/6 she saw Susanna RAZA for rash on leg.  Had a PeTH test 7/7, see below. No issues  : 6/13 she saw Jazmin Meng PA-C for a flare up of her psoriasis likely related to her other medical issues, per client     Current Medications & Medication Changes:  Current Outpatient Medications   Medication    ammonium lactate (AMLACTIN) 12 % external cream    clobetasol (TEMOVATE) 0.05 % external cream    furosemide (LASIX) 40 MG tablet    inFLIXimab (REMICADE) 100 MG injection    Multiple Vitamins-Minerals (MULTIVITAMIN & MINERAL PO)    senna-docusate (SENOKOT-S/PERICOLACE) 8.6-50 MG tablet    spironolactone (ALDACTONE) 100 MG tablet    " triamcinolone (KENALOG) 0.1 % external cream    UNABLE TO FIND     No current facility-administered medications for this encounter.     Facility-Administered Medications Ordered in Other Encounters   Medication    BREEZA Lemon-Lime flavored oral liquid for Neutral Abdominal/Pelvic Imaging 1,000 mL    hyoscyamine (LEVSIN/SL) sublingual tablet 125 mcg     Medication Prescriber:  Linda Macdonald  Taking meds as prescribed? Yes  Medication side effects or concerns:  no        Dimension 3: Emotional/Behavioral Conditions & Complications -   Previous Dimension Ratin  Current Dimension Ratin  PHQ2:       2023     2:00 PM 9/15/2023     9:12 AM 2023     5:00 PM 2023     8:00 AM 2023     1:34 PM 2023    10:00 AM 3/28/2023    11:21 AM   PHQ-2 (  [employer])   Q1: Little interest or pleasure in doing things 0 0 0 0    0 0 0 0   Q2: Feeling down, depressed or hopeless 0 0 0 0    0 0 0 0   PHQ-2 Score 0 0 0 0    0 0 0 0   Q1: Little interest or pleasure in doing things    Not at all   Not at all   Q2: Feeling down, depressed or hopeless    Not at all   Not at all   PHQ-2 Score    0   0      GAD2:       2023     9:54 AM 2023    10:00 AM 2023     8:00 AM 2023     5:00 PM 2023     2:00 PM   TAINA-2   Feeling nervous, anxious, or on edge 1 1 0    0    0    0    0 1 0   Not being able to stop or control worrying 1 0 0    0    0    0    0 0 0   TAINA-2 Total Score 2 1 0    0    0    0    0 1 0     PROMIS 10-Global Health (all questions and answers displayed):       2023     9:58 AM 2023     7:32 PM 2023    10:00 AM 2023     8:02 AM 2023     5:00 PM 2023     2:00 PM   PROMIS 10   In general, would you say your health is: Good Good  Good     In general, would you say your quality of life is: Good Good  Very good     In general, how would you rate your physical health? Fair Fair  Fair     In general, how would you rate your mental health, including your  mood and your ability to think? Good Good  Very good     In general, how would you rate your satisfaction with your social activities and relationships? Good Good  Excellent     In general, please rate how well you carry out your usual social activities and roles Good Good  Very good     To what extent are you able to carry out your everyday physical activities such as walking, climbing stairs, carrying groceries, or moving a chair? Completely Completely  Mostly     In the past 7 days, how often have you been bothered by emotional problems such as feeling anxious, depressed, or irritable? Sometimes Rarely  Rarely     In the past 7 days, how would you rate your fatigue on average? Mild Mild  Mild     In the past 7 days, how would you rate your pain on average, where 0 means no pain, and 10 means worst imaginable pain? 3 3  3     In general, would you say your health is: 3 3 3 3    3    3    3    3 3 4   In general, would you say your quality of life is: 3 3 4 4    4    4    4    4 3 4   In general, how would you rate your physical health? 2 2 3 2    2    2    2    2 3 3   In general, how would you rate your mental health, including your mood and your ability to think? 3 3 4 4    4    4    4    4 4 5   In general, how would you rate your satisfaction with your social activities and relationships? 3 3 5 5    5    5    5    5 4 5   In general, please rate how well you carry out your usual social activities and roles. (This includes activities at home, at work and in your community, and responsibilities as a parent, child, spouse, employee, friend, etc.) 3 3 5 4    4    4    4    4 4 4   To what extent are you able to carry out your everyday physical activities such as walking, climbing stairs, carrying groceries, or moving a chair? 5 5 4 4    4    4    4    4 3 4   In the past 7 days, how often have you been bothered by emotional problems such as feeling anxious, depressed, or irritable? 3 2 2 2    2    2    2    2 2 2  "  In the past 7 days, how would you rate your fatigue on average? 2 2 2 2    2    2    2    2 2 2   In the past 7 days, how would you rate your pain on average, where 0 means no pain, and 10 means worst imaginable pain? 3 3 4 3    3    3    3    3 2 3   Global Mental Health Score 12 13 17 17    17    17    17    17 15 18   Global Physical Health Score 15 15 14 14    14    14    14    14 14 15   PROMIS TOTAL - SUBSCORES 27 28 31 31    31    31    31    31 29 33     Mental health diagnosis none  Date of last SIB:  never  Date of  last SI:  never  Date of last HI: never  Behavioral Targets:  see tx plan  Risk factors:  Client dealing with liver disease/cancer and Crohns, client needs a new liver,   Protective factors:  spirituality, forward/future oriented thinking, safe and stable environment, regular physical activity, living with other people and structured day  Current MH Assignments:  see tx plan      Narrative: wk of 9/25: no thoughts of self harm. On likert scale 1-low and 10 -high:   Anxiety: 0; depression: 0; stress: 0   wk of 9/18 Today I had Virginia answer 3 questions before doing the daily reading focused on acceptance, as it related to self and to what life presents. We had a long discussion on This.   \"no thoughts of self harm or concerns\"  on likert scale 1-low and 10 -high:   Anxiety: 1  stress: 1  depression 1  issues to discuss and helpful coping: talking with my care team, using breath work. Talking to my .   She said she has not felt like eating much, but the positive of it is she has lost 10 pounds.  Virginia  completed PROMIS, scoring 33. TAINA-2, scoring 0, and PHQ-2, scoring 0.  She said she sometimes has worries in her life, but overall feels blessed to not have a lot of anxiety or depression.   wk of 9/11  : \"no thoughts of self harm or concerns\"  on likert scale 1-low and 10 -high:   Anxiety:  2  stress: 1 depression 0  issues to discuss and helpful coping:  right now resting  wk of 8/28, RR " "changed to 0 from 1.  Virginia continues to report healthy coping for anxiety symptoms.  She remains optimistic and finds using tools learned are helpful, as is discussion with supportive people.  : \"no thoughts of self harm or concerns\"  on likert scale 1-low and 10 -high:   Anxiety:  7  stress: 1 depression 2     Virginia will be having her surgery tomorrow.  She discussed \"anxiety mireya high, yet I am hopeful\"  She said she has done all she can with her health and not drinking and preparing herself mentally.  She said she was glad she had group to attend and gets \"immense support\"  wk of 8/21/2023 She reports her anxiety is down and she experiencing no stress or depression. Client worked on her Dimension Three goals this week as she learned about and discussed the topics of Shame, Stigma and Mindfulness.     wkof 8/14  Ways I can think differently: listen to my critical voice and change my thoughts  Ways I can do things differently:  talk to myself like I would a friend.  \"no thoughts of self harm or concerns\"  on likert scale 1-low and 10 -high:   Anxiety:  0  stress: 0 depression 0  issues to discuss and helpful coping:  doing fun things, attending this group, being sober  wk of 8/7 ratings on likert scale 1-low and 10 -high: Anxiety: 2 stress: 1 depression 2. She participated in discussion on \"change the channel\" when getting into an anxiety loop. \"It is a simple way to do something more productive. Virginia actively participated in discussion on Thought distortions and discussed which ones are the ones she actively wants to focus on for the time being: catastrophising and prediction.  She discussed  two situations where the techniques could be helpful, including: Should's and musts and not setting up unrealistic expectations of how she or life should be.  I want to remind myself \"until someone has walked a mile in my shoes, they don't get to  me and the same goes for me with them.  Virginia actively participated in " "meditation and said it was good to take that time to be.  \"I liked it\"  wk of 7/31  she participated in psychoeducation on Understanding my anxiety   This topic helped client to gain awareness of thoughts and feelings associated with anxiety and act with awareness when feeling anxiety and use helpful coping.  Client did actively participate in group discussions, and was able  to identify anxious thoughts, physical feelings when having anxiety: racing heart, shaking, confusion  \"why me\"  She was able to  identify 2 helpful coping methods: breath, talk with her  who is a comfort, stop the story telling/negativity when aware.  Virginia discussed that her tumor is shrinking and she is \"cautiously optimistic\". She said she wanted to share that she has many feelings associated with this, \"it even could mean I won't need a LT, but that remains to be seen\"  I told Virginia she has been shown how with our spirituality and belief, sometimes things can happen that were never anticipated.  She said \"so true\" I never had an idea that was even a part of my hope\"      Wk of 7/24- client denies thoughts of self-harm. On Likert scall 1-low and 10-high client rates anxiety 1  depression 0 and stress 2. She shared her new diagnosis of tumor growth has impacts stress and anxiety.   wk of 7/17: no thoughts of self harm or concerns\" on likert scale 1-low and 10 -high:   Anxiety:  5  stress: 5 depression 1  issues to discuss and Helpful coping:  All related to my medical, but I have great medical team and family support.  Client said of the reading:I like the ongoing discussions of shame and this reading brought me awareness that I have some to deal with regarding the nature of why many get liver cancer.       wk of 7/10:  She was able to repeat the steps of 5 senses, as a review.   \"no thoughts of self harm or concerns\".  Clients feedback to group member  on likert scale 1-low and 10 -high:   Anxiety:  2  stress: 2  depression 2  issues " "to discuss and Helpful coping: talking with you guys.  I had a craving that was pretty big this week.  Psychoeducation/Skills The thinking-feeling connection. This topic will address information from the [employer] about how our thoughts influence our feelings.  Client was able to define automatic thoughts and make a connection with some that she has.   She said that she has been continueing to gain awareness of her beliefs vs perceptions vs what is a thought and what is a feeling.  Discussed that reminding herself it is ok to have thoughts and feelings, but they are not facts for other people.    Client participated in lovingkindness meditation and said it was just what she needed today and it that it was helpful and grounding.  \"Thank you for doing it\"    wk of 7/3: Clients participated in 5 senses psychoeducation discussion, participated in guided practice and was able to name 5 parts, and said she feels it will be a helpful tool  \"no thoughts of self harm or concerns\".   I discussed the research on gratitude journals and expressed she has seemed like she has an \"attitude of gratitude\".  She said she feels blessed and hopes she does.  on likert scale 1-low and 10 -high:   Anxiety:  0  stress: 0 depression 0  issues to discuss and Helpful coping: I am learning to say no, and that boundaries are good    wk of 6/26  Attended MH skills group with Darinel Au.    She participated in psychoeducation on Core beliefsThis topic will discuss core beliefs, as a lens through which we see life and how that affects our choices.  Virginia said the discussion on core beliefs is something she will spend some time on, as the awareness is helpful.  I told her we will follow up on Wednesday with more that can be helpful when we notice harmful core beliefs.   \"no thoughts of self harm or concerns\"  on likert scale 1-low and 10 -high:   Anxiety:  1 stress: 1 depression 0  issues to discuss and Helpful coping: " "learning about self, anxiety and stress  wk of 6/19 \"no thoughts of self harm or no concerns\".  She reports on likert scale 1-low and 10 -high:   Anxiety:  1  stress: 1 depression 0  issues to discuss and Helpful coping: getting to know people better in group, hearing other's helpful coping.  Attended MH skills group with Darinel Angely.  We discussed anticipating some situations and \"gearing up\" by practicing deep breathing. I reminded client of previous discussions where beliefs often guide how we feel about certain situations, but that we can reframe them, or look at them for the particular situation.      Wk of 6/12  I am feeling much better and less anxiety about group attendance as I continue to attend and get to know others. \"no thoughts of self harm or concerns\". On likert scale 1-low and 10 -high:   Anxiety:  1  stress: 1 depression 1  issues to discuss and Helpful coping:  Breathing, getting to others in group  Wk of 6/6 \"no thoughts of self harm\"  Attended MH skills group with Darinel Angely  Week of 5/31/23 :Client reports feeling anxious due to first treatment experience, not knowing what to expect, her  Mother's declining health-needing a placement for higher level of care, spouse recently lost his job-need to obtain COBRA insurance and unable to care for grandchildren because of attending treatment. Client was saw Traci Aguilar and has not seen them in about 6 weeks. To follow up with primary counselor re: seeing psychotherapist or return to [facility].  Client rates the following on a scale of 1 (low) to 10 (high):  Anxiety- 5  Depression -1  Stress- 5 as a result of above concerns.   Client participated in psychotherapy/education on self-care, introduction to relaxation meditation-tensing and relaxing muscles, and sleep hygiene.  Current Mental Health symptoms include: . Active interventions to stabilize mental health symptoms this week :   At SI her PHQ-2 score was 0 and his TAINA-2 " "score was 1.  PROMISE 10 was  31:  She reports that at this time she is dealing with her mother's living situation and health issues. She reports that her father has passed away and that she is estranged from 1 sister.         Dimension 4: Treatment Acceptance / Resistance -   Previous Dimension Ratin  Current Dimension Ratin  RENEE Diagnosis:  Alcohol Use Disorder   303.90 (F10.20) Severe In a controlled environment  Commitment to tx process/Stage of change- contemplation  RENEE assignments - see tx plan      Narrative - wk of : motivation for sobriety =10 on likert scale 1-low and 10 -high. She reports accepting that she is an alcoholic.  wk of   on likert scale 1-low and 10 -high: 10 on motivation for sobriety. She   wk of   on likert scale 1-low and 10 -high: 10   motivation for sobriety 10.  Motivated to continue a life of sobriety, \"I am learning so much about self.  wk of 2023 Client expressed this week about her alcoholism that that she liikes that \"I'm getting better not as shameful.\" That for her it has helped to accept alcoholism, \"occurs as a disease.\"     wk of   Virginia told group \"I know I am an alcoholic\" she said she is so glad to have had to find her way to the group, even if it was through health issues or she would still be doing damage.  motivation for sobriety; 10 she likes being sober and reports she likes learning and attending group.   wk of   motivation for sobriety 10.  Motivation is high to have quality of life  wk of   on likert scale 1-low and 10 -high:  motivation for sobriety: 10   WK of -motivation 10 on Likert scale.she expressed strong commitment to sobriety, see notes dated  for more details.  wk of :  on likert scale 1-low and 10 -high:  motivation for sobriety: 10+  wk of 7/10  My motivation for sobriety is 10/10\".  I think it is so important to attend and be dedicated to support in this group, and to \"say things outloud, so it takes " "the power away, as you say, Alana\"  wk of 7/3  on likert scale 1-low and 10 -high:  motivation for sobriety 10.  Virginia said  session really stuck with her and she thought about a lot of things and is really understanding why attendance and building on the days and lessons is important.     wk of   sober support motivation:  2, but I think motivation will grow as I keep settling in and I am motivated 10/10 for sobreity.    Wk of :  Client reports motivation for sobriety is 10 (high) Motivation for attending community support 5 (on scale 1 low-10 high).   Virginia expressed to her group how helpful they are to her and how much she appreciates all that members shared about their stories.  She said she gains such helpful insight for self   Week of  Virginia discussed that she has been late a couple times, but realized with 2 other members gone, how this speaks to the importance of being on time and valuing and respecting others time, too.   She will also miss tomorrows group due to a long awaited medical appt. Dim 4: on likert scale 1-low and 10 -high:  Motivation for sobriety:  10  motivation for sober support attendance: not doing yet  Wk of   Virginia also said she finds motivation for sobriety in her family and friends, \"but really deep down need to do this for myself\"  Told counselor and group how she didn't think she would get much out of tx and already is getting so much and is grateful for older adult group   Week of 23: Client reports motivation for sobriety is 10 (high) Motivation for attending community support 5 (on scale 1 low-10 high).  The patient is motivated, to explore treatment.  She said \"of course I am partly here to fulfill requirements for liver transplant, but I want to learn healthy coping for lifelong sobriety.      Dimension 5: Relapse / Continued Problem Potential -   Previous Dimension Ratin  Current Dimension Ratin  Relapses this week - None  Urges to use - None  UA " results -   Recent Results (from the past 168 hour(s))   Erythrocyte sedimentation rate auto    Collection Time: 09/19/23  9:19 AM   Result Value Ref Range    Erythrocyte Sedimentation Rate 66 (H) 0 - 30 mm/hr   CRP inflammation    Collection Time: 09/19/23  9:19 AM   Result Value Ref Range    CRP Inflammation 16.73 (H) <5.00 mg/L   Phosphatidylethanol (PEth), Whole Blood    Collection Time: 09/19/23  9:19 AM   Result Value Ref Range    PEth 16:0/18:1 (POPEth) <10 ng/mL    PEth 16:0/18:2 (PLPEth) <10 ng/mL    EER Phosphatidylethanol (PETH) See Note    Basic metabolic panel    Collection Time: 09/19/23  9:19 AM   Result Value Ref Range    Sodium 133 (L) 136 - 145 mmol/L    Potassium 3.8 3.4 - 5.3 mmol/L    Chloride 100 98 - 107 mmol/L    Carbon Dioxide (CO2) 23 22 - 29 mmol/L    Anion Gap 10 7 - 15 mmol/L    Urea Nitrogen 7.3 (L) 8.0 - 23.0 mg/dL    Creatinine 0.74 0.51 - 0.95 mg/dL    Calcium 8.6 8.6 - 10.0 mg/dL    Glucose 101 (H) 70 - 99 mg/dL    GFR Estimate >90 >60 mL/min/1.73m2   CBC with platelets    Collection Time: 09/19/23  9:19 AM   Result Value Ref Range    WBC Count 5.2 4.0 - 11.0 10e3/uL    RBC Count 3.62 (L) 3.80 - 5.20 10e6/uL    Hemoglobin 12.6 11.7 - 15.7 g/dL    Hematocrit 36.9 35.0 - 47.0 %     (H) 78 - 100 fL    MCH 34.8 (H) 26.5 - 33.0 pg    MCHC 34.1 31.5 - 36.5 g/dL    RDW 14.6 10.0 - 15.0 %    Platelet Count 151 150 - 450 10e3/uL   Hepatic function panel    Collection Time: 09/19/23  9:19 AM   Result Value Ref Range    Protein Total 7.7 6.4 - 8.3 g/dL    Albumin 3.2 (L) 3.5 - 5.2 g/dL    Bilirubin Total 2.2 (H) <=1.2 mg/dL    Alkaline Phosphatase 153 (H) 35 - 104 U/L    AST 48 (H) 0 - 45 U/L    ALT 20 0 - 50 U/L    Bilirubin Direct 0.76 (H) 0.00 - 0.30 mg/dL   INR    Collection Time: 09/19/23  9:19 AM   Result Value Ref Range    INR 1.21 (H) 0.85 - 1.15   AFP tumor marker    Collection Time: 09/19/23  9:19 AM   Result Value Ref Range    AFP tumor marker 18.3 (H) <=8.3 ng/mL       Using  "ReSet Clem: N/A    Narrative- wk of 9/25: cravings =1 on likert scale 1-low and 10 -high. Reported dinner with friends as a trigger but reported only minimal cravings. She reports engaging in conversation and ordering a club soda as helpful coping skills.  wk of 9/18  She participated in Psychoeducation/Skills Relapse Prevention (RENEE)  This topic gave a general overview of basic relapse prevention, including definitions of warning signs, triggers and cravings and the importance of addressing healthy coping skills for ongoing relapse prevention.Patient was able to  name 4 of their warning signs and triggers, including:  Too much time with others whom don't like to stop drinking, concerts/shows if I don't have a plan  Patient was able to name 4 healthy coping mechanisms for dealing with their warning signs and triggers: including: making a plan of structure  wk of 9/11 Sights, sounds and situations that are triggers : concerts and comedy shows, but support, discussion and a fun NA drink are how I deal with it.   Virginia said each day she is healing more from surgery but is still quite sore.  \"I have had absolutely no cravings or desire to use alcohol\".   Gone week of 9/4 for surgery   wk of 8/28  cravings, warning signs, triggers: none I went to a [employer] game and had no cravings.\"   Wk of 8/21/2023 She reports having no cravings to drink this week and having a \"wonderful week.\"       Wk of 8/14   lient discussed a big warning sign for self is thinking I can drink again, especially socially.  II think about  it regularly.  Group discussed that awareness of this is good and it is likely normal to do this.  I encouraged her to continue to discuss this and down the road to keep in mind with sponsor, therapist, etc. And work through it.  I told her it is really good she can say this outloud.   Something I am doing well: having plans of structure, especially when around situations I used to drink in     any cravings, warning " "signs, triggers: the [place] concert could have been but wasn't .  I have supportive people and that helps, but so does the realization of my use patterns and how I feel better not using  wk of 8/7 Virginia said that a trigger for her was going to the casino for a comedy show. \"I haven't been there since starting tx\"  My  and I just kept walking and he also said \"I got you\".  She said her cravings only lasted a couple minutes.  We discussed how this can be common and some group members said they cannot go to the same places at all because of that kind of trigger.  She said \"I get it, and will continue to be aware of situations like this   wk of 7/31   cravings, warning signs, triggers: None this week   WK of 7/24-client denies triggers, cravings or warning signs this past week.  wk of 7/17:  cravings, warning signs, triggers:i went to a musical festival, had a few cravings, but also had insight when watching others use, \"glad it isn't me\"  Wk of 7/10 Had a PeTH test 7/7, see above. No issues. She discussed the Serenity Prayer and that she finds it helpful.  She also discussed having cravings at a 4 of 10 when cooking, and what she found helpful:  Processed, talked with , talk with group today about it.   Virginia said she really had quite a learning week with events in group, discussions and assignments.  \"My heart goes out to the new person the other day, but also I had some anger about all the disruptions and negativity from her\"  \"I am ready to move on, and learn what I learned, and know that could be any one of us\"  wk of 7/5  cravings, warning signs, triggers: none.  My adult kids are caring and asked if I thought I would be OK going to [facility], as there is drinking there.  She told them she appreciates the concern, but feels with them and little to no triggers, for quite some time, she didn't feel it was a problem.  Virginia said she had a wonderful time with her family and is not concerned about use " "over 4th of July holiday either, she has a good plan for spending time with family and focusing on good food.   wk of 6/26  Dim 5: cravings, warning signs, triggers: none.  Wed reading was focused blame.  It said blaming is hiding, that when we blame others we try to hide our character defects.  We discussed the meaning of character defects. \"I was in big denial for a long time, it took being told I would need a new liver for me to really realize what drinking was doing. \"it hit me like a ton of bricks when I finally really heard it\"  wk of 6/19  She participated in  Psychoeducation/Skills: Self-Care.  Client s completed self-care assessment and described self-care as feeling better physically. Client identified physical self-care they do well- attending medical appointments, one they do poorly or needs improvement- eating healthy food and barriers that prevent themselves from self-care - cost of food is expensive, the shopping and prep.  This topic will give a general overview of self-care: physical, environmental, emotional, social and spiritual. It will explore importance of self-care and the impact on recovery.   Wk of 6/12. Dim 5: warning signs, triggers and cravings: none   helpful coping: attending group. Examples of benefits of drinking or drug use, or other behaviors Physical, Social mental or emotional: confidence, escape from reality, \"friends\"     Main drawbacks connected to these: not real friends that only want to drink together.  Headaches and slurred speech.  Remoarse  Benefits connected to abstinence from addictive behaviors:more able to do me and be me, I enjoy trud friends, I remember things better and will have better memories      Drawbacks you see connected with abstinence from these:  Wk of6/6 she participated in the daily reading and said she liked it.  Virginia also gave meaningful feedback to client who discussed use episode: \"it could be anyone of us\"  She said this is important and she is " "learning from others sharing.  Week of 23: Client denies riggers or warning signs this past week. She said she liked the saying \"it is just as easy to create good habits as bad ones\"   Patient reports family members and her  are concerned about their substance use.  Patient reports their recovery goals are \"abstinence forever.\"  She has had no previous detoxes or treatments and needs to gain insight into healthy coping for relapse prevention.      Dimension 6: Recovery Environment -   Previous Dimension Ratin  Current Dimension Ratin  Family Involvement - not at this time  Summarize attendance at family groups and family sessions - NA  Family supportive of treatment?  Yes    Community support group attendance - no  Recreational activities - some camping, cooking and boating    Narrative - wk of : sober support motivation: 4 on likert scale 1-low and 10 -high. She denies attending any sober support meetings. Client participated in an interactive educational session and learned about and discussed \"Community Based, Sober Support. this week. Client learned about 14 community based sober support meeting options and shared that she is interested in attending community based sober support meetings, that she only wants to attend the in person meetings not Zoom meetings. She shared after the discussions on the different types of sober support meetings that the one she \"liked the Rajendra and the 12 Steps\" meeting.     wk of   sober support motivation:  5, but I did make a call to a friend who has been sober 13 years and we are going to meet in a few weeks .   we discussed focused on self care over the next days and She intends to rest, babysit her grandkids with her  overnight, see her sister whom she has not seen for 4 months.     wk of  sober support motivation:  5, but I will look into SMART Recovery. She participated in Psychoeducation/Skills Goal Setting (RENEE)  This topic will " "give a general overview of short, intermediate and long term goals including the value of goal setting. She completed her goal setting worksheet and discussed things that can get in the way of her goal: others that suck energy out of her, old thinking about how nice a drink would be  Plan of structure for weekend: She said she will go camping with family this weekend in their new RV, she also has to rest a lot.  \"I love football and will watch most any game\"  absent wk o f9/4  wk of 8/28:\" I have a great deal going on with my upcoming surgery, will likely look into sober support when things settle down.  Right now my group is support\"  wk of 8/21 Client worked on her dimension 6 goals this week as she learned and discussed more about sober support. She continues to report a low desire to attend outside community based sober support meetings, however she does appear to be open to learning more about it.     wk of 8/14 Virginia discussed that she went to [place] with friends who were drinking, but that they all said they would not need to drink. She said she appreciated that but it really didn't bother her in the least, as she likes being sober. Group discussed this type of situation and some group members said it would have been hard for them. Virginia said if it ever is, she will change what she does or ask them not to use. \"I have very dear friends and I know they mean it when they say the don't need to drink around me.  sober support motivation: 5   8/7 \"I am at 5 on wanting to attend community support, but find group support very helpful!  wk of 7/31  sober support motivation:  I have so much going on with medical and this meeting 3x per week, that realistically, I will not likely look into community support until after my surgery at the end of the month.   WK of 7/24-client's support are her family, she is not attending support groups and motivation to attend is 2 on Likert scale. Client shared she has learned this week is " "this is a continuous journey and staying positive is huge. To support her sobriety this week she will continue to surround herself with good people. She is grateful for home, shelter and stability.  wk of 7/17:  sober support motivation: this group and my family.     wk of 7/10: \"I am motivated for outside support at 2/10, but when I get done with Tuesday group, I think my motivtiaon will be higher\"  wk of 7/5   We discussed \"The power of Solitude\" and how spending time alone with ourselves may not be easy or even desirable, it it is key to getting to know self.  I emphasized that studies tell us we need each other to survive and be happy, but also when we loose the ability to be alone with ourselves, sometimes our overstimulated nervous systems suffer from no place to rest and recharge.  She said this is good to discuss, as she may even consider doing it a bit more, as it \"really does recharge me\" She said she is pretty good about finding alone time and also uses journaling.  I asked client how this applies to recovery and treatment:She said she will often take a topic from group, or a question and journal about.  She said it really helps   Wk of 6/26. She said sober support motivation is  2, but I think motivation will grow as I keep settling in  I talked with clients about taking responsibility for our actions, so we can be empowered and own our life and also change if we need to. We discussed resources including AA, SMART Recovery.  I encouraged clients to start exploring meetings.  Wk of 6/19.  Virginia reports with having group 3x per week, it is a good deal of support, \" I will continue to learn about peer support\"  wk of 6/12client discussed resources sober support, including AA, Liver Transplant support and [place] recovery connection  wk of 6/6  goals for the wknd: look into volunteering at [facility] for writing card.  \"no community support attendance at this time\"   Client is not currently attending community sup[ort " "and does not have a sponsor. Client's supportive people are her family. Client shared she has friends and has not talked with many about her current situation, because \"I am a private person,\" (treatment and needing transplant).  Patient reports they are involved in community of holden activities    They reports spirituality impacts recovery in the following ways:  \"There was some changes as far as .  We do belong to a [employer]regation but it's been a number of years since we've been.\"  Patient believes her spirituality and sobriety are connected. Patient reported having 3 children, all adults.  Patient has 2 grandchildren.    She lives with her spouse and son. recreational/leisure activities: camping, cooking, boating, playing board games and card games.  Patient is currently disabled due to her Crohn's Disease.  Patient reports their income is obtained through SSDI disability; spouse.  Patient does not identify finances as a current stressor.         Progress made on transition planning goals: just began tx    Justification for Continued Treatment at this Level of Care:  No previous tx, needs 6 months of sobriety to be eligible for a transplant. Recent surgery and she may not need transplant, but reports she wants sobriety for her life.  Needs to learn long term relapse prevention, she reports finding that attending group is helpful for her and she is learning.She said she did not think she would get much out of this but is getting so much support and understanding of healthy coping.  She is not attending community support, at this time, \"but I find my group so helpful.  She is working on goals and assignments.  She gets regular PEth  tests, and there have been no indications of alcohol use.    Treatment coordination activities this week: wk of 9/25 staffed with SOLANGE Ponce. wk 9/18 Celia,  on LT asked her time frame to be done, since Virginia just had surgery and there is no issue on the horizon about " getting a new liver   wk 8/28 Reviewed notes from margaret Rowland  7/31 reviewed Onelia Singh notes and discussed client with her.  7/18  Discussed client info with sub Onelia Singh   Discussed client info with margaret Singh  Need for peer recovery support referral? No    Discharge Planning: Not at this time      Has vulnerable adult status change? No    Interdisciplinary Clinical Supervision including:  no    Are Treatment Plan goals/objectives effective? Yes  *If no, list changes to treatment plan:    Are the current goals meeting client's needs? Yes  *If no, list the changes to treatment plan.  Plan:  Continue per Master Treatment Plan    *Client agrees with any changes to the treatment plan: Yes  *Client received copy of changes: Yes  *Client is aware of right to access a treatment plan review: Yes     Staff Members Contributing To Weekly Review:      SOLANGE Hamilton 9/27/2023  SOLANGE Jamil 9/25/2023

## 2023-09-25 NOTE — PROGRESS NOTES
"Client participated with the check-in process and reported no change in sobriety date. She has obtained almost nine months of sobriety. Client expressed feeling pleasantly exhausted after having her grandchildren stay with her last night. Client had no issues or concerns to discuss. She had attended a dinner with friends and had minimal cravings for alcohol which she dealt with by engaging in conversation and ordered club soda instead. She said she was learning that she needed to be more patient \"with things in front of me, things from healing, and tasks at hand\". Client reported supportive people in her life but denied attending sober support meetings. We discussed the difference between sober support and natural support. Client also received education on DBT concepts of Addict Mind/Clear Mind/Clean Mind and related Reason Mind/Wise Mind/Emotional Mind. We also discussed the importance of using \"I\" statements for assertive communication. Client expressed being grateful for this group. This session relates to her Dimension 5 goal of developing sober coping and living skills in order to address the development of a strategy for long term recovery.  Plan:  Client will consider the benefits of trying a sober support group.  "

## 2023-09-26 ENCOUNTER — DOCUMENTATION ONLY (OUTPATIENT)
Dept: GASTROENTEROLOGY | Facility: CLINIC | Age: 58
End: 2023-09-26
Payer: COMMERCIAL

## 2023-09-26 NOTE — PROGRESS NOTES
Received fax from [facility]. Prescriber order for delayed infusion. Needing providers signature. Routed to nurse.

## 2023-09-27 ENCOUNTER — HOSPITAL ENCOUNTER (OUTPATIENT)
Dept: BEHAVIORAL HEALTH | Facility: CLINIC | Age: 58
Discharge: HOME | End: 2023-09-27
Attending: FAMILY MEDICINE
Payer: COMMERCIAL

## 2023-09-27 PROCEDURE — H2035 A/D TX PROGRAM, PER HOUR: HCPCS | Mod: HQ

## 2023-09-27 NOTE — GROUP NOTE
Group Therapy Documentation    Attestation:   Dr. De La Rosa - Medical Director - Provides oversight and supervision of care.     PATIENT'S NAME: Abiola Matute  MRN:   [id number]  :   1965  ACCT. NUMBER: [id number]  DATE OF SERVICE: 23  START TIME: 12:00 PM  END TIME:  2:00 PM  FACILITATOR(S): Isaac Rowland LADC  TOPIC: BEH Group Therapy  Number of patients attending the group:  5  Group Length:  2 Hours    Group Therapy Type: Addiction and Psychoeducation    Summary of Group / Topics Discussed:    Summary of Group / Topics Discussed:     Community Based, Sober-Social Support, Relapse Prevention (RENEE)     This topic will give a general overview of the importance of Community Based Sober Support with additional emphasis on meetings and sponsorship/mentorship. Specifics about attending meetings, fellowship/social support were presented via educational materials and video film clips, followed by discussion. This session addresses the role of sober support in the reduction of stress and cravings to use substances in early sobriety and the role it plays in making connections that support ongoing long-term sobriety. The importance of social support as a prevention skill to be practiced on a regular basis as opposed to as a last-ditch effort when in crisis or once substance is in hand was emphasized in this session.      Objective(s):  Client will identify or be introduced to 14 Community Based Sober Supportive Recovery groups, including, Smart Recovery, Rajendra and the 12 Steps, AA, N/A, Celebrate Recovery and Women for Sobriety. Clients to discuss their own plans for which type of ongoing sober supportive meeting they will attend and depart  the session with the informational sheet called  Resources for Connecting with Community Based Sober Supportive Meetings . This resource packet contains internet links and phone numbers to assist clients in connecting with the specific type of sober support they  are seeking.    Client will identify the importance of Recovery Meetings in maintaining ongoing sobriety in the present and for the long haul.   Client will discuss the importance of meeting peers in recovery while in treatment and how connecting with others in recovery will help them maintain the focus on recovery they are building in treatment.    Structure:  Provide psychoeducation on the benefits of practicing social support, including community based sober support meetings, mentorship, or sponsorship on a regular basis to build to maintain sobriety.    Provide psychoeducation on how community based sober, social support (fellowship) can be used to increase internal motivation for sobriety and recovery.   Explore with clients their experience with community based sober support meetings making friends and mentorship/sponsorship. Clients to share in this session either their experience with community based sober support or the option or options they have been considering for increasing their sober supportive network of people in recovery and recovery meetings.   Gain understanding of why sober support from others in recovery can make the difference between going back to substance use while having cravings or in crisis and how receiving helpful sober supportive help from others in recovery enhances clients ability to maintain sobriety and maintain long term ongoing sobriety.   The topic of alcoholism as a brain disorder that has features of being a chronic and relapsing disorder were also discussed and the importance getting help as soon as possible if or when a use episode happens was also discussed in this session.        Expected therapeutic outcomes:   Build recovery resilience as a proactive strategy to reduce stress and concomitant cravings, leading to resilience against relapse in early sobriety.  Growth in patient  sober network  by assisting clients in finding the type of        community based sober  "support meeting that meets their personal needs for sober supportive connections.     Growth in patient  sober network  by assisting clients in finding the type of       community based sober support meeting that meets their personal needs for sober supportive connections.                        Therapeutic outcome(s) measured by:  Patient's ability to explain impact of implementing (regularly putting into practice) the practicing of sober social support and a lessening of the severity and occurrence of PAWS symptoms noted each week in check-in.  Patient's demonstration of learning and commitment via weekly check in, specifying their efforts to increase community based sober support, increase in motivation for sobriety and community-based sober support recovery meetings increasing as noted in Likert scale.  An improvement in patients successfully completing treatment and a lowering of patient self-reported cravings severity and use number of use episodes while in treatment as self-reported in weekly check-ins.                    Group Attendance:  Attended group session    Patient's response to the group topic/interactions:  discussed personal experience with topic, expressed readiness to alter behaviors, expressed understanding of topic, and listened actively    Patient appeared to be Actively participating and Engaged.        Client specific details:   In this session client checked in reporting ongoing sobriety and no changes since her check in on Monday.  She reports not having attended any outside sober support meetings since being in treatment but that she has plans to meet with some recovery friends for lunch. After being presented with the educational video on \"Smart Recovery\" sober support meetings she expressed that whatever meeting she will choose to attend that the meeting needs to be in person.  Client expressed she found the information on Rajendra and 12 Step meeting to her liking and that she is still " in the search for what type of sober support meeting will work for her. Throughout this session client worked on her Dimension 6 Treatment Plan Goals.

## 2023-09-27 NOTE — ADDENDUM NOTE
Encounter addended by: Aaron Brown [facility] on: 9/27/2023 4:14 PM   Actions taken: Charge Capture section accepted

## 2023-09-28 ENCOUNTER — TELEPHONE (OUTPATIENT)
Dept: GASTROENTEROLOGY | Facility: CLINIC | Age: 58
End: 2023-09-28
Payer: COMMERCIAL

## 2023-09-28 NOTE — ADDENDUM NOTE
Encounter addended by: Isaac Rowland LADC on: 9/28/2023 4:56 PM   Actions taken: Clinical Note Signed

## 2023-09-28 NOTE — TELEPHONE ENCOUNTER
[facility] Call Center    Phone Message    May a detailed message be left on voicemail: yes     Reason for Call: Other: [place] calling in to see if she needs a repeat MRI liver with and without contrast since she had one done on 09/07/2023 and recent lab work. Please review and reach out to patient to advise if repeat liver MRI or labs are needed.      Action Taken: Message routed to:  [facility] ([facility]): Hepatology    Travel Screening: Not Applicable

## 2023-09-28 NOTE — TELEPHONE ENCOUNTER
Spoke to patient. MRI should not occur on 10/2/23, had MRI on 9/7/23 that showed no residual tumor.  Next MRI should be first week of December.  Message to scheduling asking them to move to firt week of December, contact patient.

## 2023-10-02 ENCOUNTER — HOSPITAL ENCOUNTER (OUTPATIENT)
Dept: BEHAVIORAL HEALTH | Facility: CLINIC | Age: 58
Discharge: HOME | End: 2023-10-02
Attending: FAMILY MEDICINE
Payer: COMMERCIAL

## 2023-10-02 ENCOUNTER — PATIENT OUTREACH (OUTPATIENT)
Dept: GASTROENTEROLOGY | Facility: CLINIC | Age: 58
End: 2023-10-02

## 2023-10-02 DIAGNOSIS — K51.018 ULCERATIVE (CHRONIC) PANCOLITIS WITH OTHER COMPLICATION (H): Primary | ICD-10-CM

## 2023-10-02 PROCEDURE — H2035 A/D TX PROGRAM, PER HOUR: HCPCS | Mod: HQ

## 2023-10-02 NOTE — GROUP NOTE
"Group Therapy Documentation    PATIENT'S NAME: Abiola Matute  MRN:   [id number]  :   1965  ACCT. NUMBER: [id number]  DATE OF SERVICE: 10/02/23  START TIME: 12:00 PM  END TIME:  2:00 PM  FACILITATOR(S): Aaron Brown LADC  TOPIC: BEH Group Therapy  Number of patients attending the group:  6  Group Length:  2 Hours    Group Therapy Type: Addiction    Summary of Group / Topics Discussed:    Coping Skills/Lifestyle Management, Choices in Recovery, and Meditation/Breathing Exercises  Client participated in the check-in process and addressed any issues. Client took part in discussion of the relevance of trauma in addiction. Client introduced to Radical Acceptance. Client actively participated in a \"Private Garden\" guided visualization exercise. Client expressed something they were grateful for.       Group Attendance:  Attended group session    Patient's response to the group topic/interactions:  cooperative with task, discussed personal experience with topic, expressed readiness to alter behaviors, and gave appropriate feedback to peers    Patient appeared to be Actively participating.        Client specific details:  Client participated with the check-in process and reported no change in sobriety date. She was congratulated by writer and group peers for having obtained over 9 months of sobriety. Client expressed feeling giddy because she was able to mow the lawn this morning which was a sign of returning to normalcy. She reports cravings at 1 of 10 triggered by watching football with drinking friends. She reports dealing with cravings by getting away from the group for a short time by taking a walk outdoors. Client took part in a discussion of trauma as associated with addiction and was introduced to the idea of radical acceptance. She participated in a \"Private Garden\" guided visualization which she described as helpful. Client expressed being grateful for the weather. This session relates to her Dimension 5 " goal of developing sober coping and living skills in order to address the development of a strategy for long term recovery.  Plan:  Client will consider additional actions she can take to further her recovery.    Attestation: Dr. Rj CHEN - Provides oversight and supervision of care.

## 2023-10-02 NOTE — TELEPHONE ENCOUNTER
Spoke with Yolande GOODRICH at [facility], ask Yolande to check in on the lab on Predictr pK. Due to the confusion and delay. Ask that the predictr pK was completed on last infusion. If it was I need to cancel it and if it was not if we can get it done prior to next infusion.    Confirm with Stacy at [facility], they did not get a sample recently. Last specimen was completed in 8/4/23.

## 2023-10-03 NOTE — TELEPHONE ENCOUNTER
Dr. Forbes,  Spoke with [place], pt states she notice she is having more inconsistency, frequencies and urgency in her BM. Some days she has 3-4 form bm and others she will have 8-10 form bm. This has been happening prior to her surgery.  Pt states she is having increase joint pain. It aches every time she walks or goes up and down the stairs.    Order fcal. It will be mailed out and she will submit it.    Order rheumatology referral for joint pain.    Please advice any other recommendation.    Thank you.  Remi

## 2023-10-03 NOTE — TELEPHONE ENCOUNTER
Yolande RN - [facility] called to confirmed that the infusion RN did not drawn predictr pK lab. Pt is getting labs done at [facility].

## 2023-10-04 ENCOUNTER — HOSPITAL ENCOUNTER (OUTPATIENT)
Dept: BEHAVIORAL HEALTH | Facility: CLINIC | Age: 58
Discharge: HOME | End: 2023-10-04
Attending: FAMILY MEDICINE
Payer: COMMERCIAL

## 2023-10-04 ENCOUNTER — DOCUMENTATION ONLY (OUTPATIENT)
Dept: GASTROENTEROLOGY | Facility: CLINIC | Age: 58
End: 2023-10-04
Payer: MEDICARE

## 2023-10-04 PROCEDURE — H2035 A/D TX PROGRAM, PER HOUR: HCPCS | Mod: HQ

## 2023-10-04 NOTE — GROUP NOTE
"Group Therapy Documentation    PATIENT'S NAME: Abiola Matute  MRN:   [id number]  :   1965  ACCT. NUMBER: [id number]  DATE OF SERVICE: 10/04/23  START TIME: 12:00 PM  END TIME:  2:00 PM  FACILITATOR(S): Judi Dodd LADC  TOPIC: BEH Group Therapy  Number of patients attending the group:  5  Group Length:  2 Hours    Group Therapy Type: Addiction and Psychoeducation    Summary of Group / Topics Discussed:    Balanced Lifestyle , Boundaries, Communication, Forgiveness, and Mindfulness      Attestation:   Dr. Rj MD - Provides oversight and supervision of care.     Today I had clients fill out TAINA-2, PHQ-2, PROMIS 10 and told them I was available to discuss anything needed at break.  I discussed group guidelines and rules.  I mentioned to Clients an incident that tended toward  stepping over a line of self expression and getting into \"judgement\" about some personal opinions and in regard to politics.  Group members expressed wanting to be non-judgmental and have group be a safe space for all, it was a very long and good discussion.It also lead into a discussion of boundaries and clients particpated in discussion of how addiction affects relationships.  We briefly reviewed a few concepts of acceptance and radical acceptance as Aaron Brown discussed this briefly this week with group when he subbed.   I told clients next week we will be doing some work on understanding Radical Acceptance.  I told clients I was noticing from check in that cravings seem to be up so we reviewed Urge surfing.     Group Topic: Coping Skills-Urge Surfing      This topic will focus on gaining insight into a technique that can be used to avoid acting on an unwanted behavior.     Objective(s):     Client will gain insight into coping skills to help manage triggers/cravings.   Client will practice acknowledging their cravings sensations as they experience them, without passing a value judgment or acting on them      Structure " "(modalities, homework, worksheets, etc)      Provide psychoeducation on various strategies to effectively manage cravings/triggers.   Utilize video introducing guided urge surfing meditation.    Use teach-back techniques to ensure client understanding.   Client will be provided handouts to enhance learning.      Expected therapeutic outcome(s):     Client will more effectively manage triggers and cravings in their daily life   Client will manage the thoughts and feelings of cravings without actually taking that action.      Therapeutic outcome(s) measured by:      Client s ability to teach back information, actively participate in group discussions, and answering questions relating to group materials.         Group Attendance:  Attended group session  Patient's response to the group topic/interactions:  cooperative with task, expressed readiness to alter behaviors, expressed understanding of topic, and gave appropriate feedback to peers  Patient appeared to be Attentive and Engaged.        Clients specifice information :  Today's session focused on dim 5 Urge surfing and dim 6 boundary setting, also dim 3 Phq-2, TAINA-2, PROMIS. She completed PHQ-2, TAINA-2, and PROMISE and she expressed no need for further discussion at this time.  [place] expressed understanding of limits of discussion in group, including politics and Faith. Counselor emphasized that  beliefs are ok, just sometimes how one express them might be unfair or sound judgemental. [place]  participated in discussion of how addiction affects relationships and said: things that are hard are  even more difficult\" Client was able to teach back information for urge surfing and said this is a good tool.  She  gave examples of why the discussion of boundaries was good and how she feels it connects to sobriety.  She said she liked the examples of words and phrases to use for boundaries, that were on handout.      P)  Continue to follow group guidelines discussed.  Use " urge surfing as needed for helping with cravings.     Judi Dodd, MS, LADC, LPC    Attestation:  Dr. Rj CHEN - Provides oversight and supervision of care.

## 2023-10-05 NOTE — PROGRESS NOTES
Spoke with [place] and relayed MD message/recommendations.    [place] will submit fcal when she gets it. She will make rheumatology appt. And aware we will try to get her in sooner.  Patient acknowledged understanding and agree with plan. Answer all questions offered contact information for pt to call with any questions or concerns.

## 2023-10-05 NOTE — PROGRESS NOTES
[facility] Weekly Treatment Plan Review      Attestation:   Dr. De La Rosa - Medical Director - Provides oversight and supervision of care.        Date Span: Monday: 10/2/23 through Fuad 10/8/23     Date     Group Therapy 2 hours   2 hours   0 hours 0 hours       Individual Therapy                Family Therapy                 Psychoeducation                 Other (Specify)                   Client had no absences this week     Wk o f  Virginia is excused this week due to holiday and to recovering from her surgery last week.  All notes below are based on last attendance and will be updated when client returns next week    Clients individual sessions noted below. Client had 1 excused absence this week, due to having surgery.    Client has no absences this week    Client has one excused absence this week , due to medical appts.  Client excused from group , due to medical appointment.     Program Running Totals: (No Phase 1 IOP due to this program being only OP level of care)  Total # of Phase 2 OP Group Sessions: 35 for 70  Virginia gets 60 sessions for 120 hours at this time and has 43  Counting her individual she has used 30  sessions and 53 hours  Total # of Phase 3 OP Recovery Management Group Sessions: None    Total # of 1:1 Sessions:  1 hour BALWINDER   1 hour 5/30  7/3,                                     Darinel , , ( & -Onelia)                    Weekly Treatment Plan Review     Treatment Plan initiated on: .    Dimension1: Acute Intoxication/Withdrawal Potential -   Previous Dimension Ratin  Current Dimension Ratin  Date of Last Use 22  Any reports of withdrawal symptoms - No  wk of : Client reports no change in sobriety date and exhibits no sx of intoxication or withdrawal.      Dimension 2: Biomedical Conditions & Complications -   Previous Dimension Ratin  Current Dimension Ratin    Medical  "Concerns: wk of 10/2 \"still healing from surgery, but good\"  wk of 9/25: Client denies any new or worsening medical issues.  wk of 9/18  Virginia discussed that she was not aware before this surgery that chances are high she will still need a transplant someday.  She said she went through a range of emotions, as she was not prepared for this. \"I had to go through what I did, and now feel better about keeping my life on the positive and 'I will deal with that bridge when I get to it\".  Group expressed empathy for all she has been through and also her positive attitude of gratitude.    I told her Celia,  on LT asked her time frame to be done, since Virginia just had surgery and there is no issue on the horizon about getting a new liver, I wanted to make sure I was understanding so as to know if Virginia and LT are good with normal schedule, which would have her completing about the end of November. Virginia met with Dr. Saavedra today and he said as far as anyone could ever see there is no thought or idea she will have a transplant.  Sometimes a person needs a new liver years from now, but things are looking good at this time.  Virginia said she has nothing special she wants to work on \"I keep learning from all that we do and I am sure building bonds with the others\"     wk of 9/11 \"no concerns, continuing to recovery from surgery\"  Surgery 8/29 for her cancer  /821/2023 Reports having no new or worsening health conditions. Reports having an appointment this week on 8/23/2023 with her Cardiologist.     wk of 8/14 No concerns I did  my pre op eval for my upcoming surgery with Dr. Bear and nurse Sakina Glass  wk of 8/7 No new concerns  wk of 7/31:  no, just good news that they may be able to remove all my tumore and I may not need a liver.  WK of 7/24-Client has an appointment with surgeon regarding increase in tumor. She is excused from programming due time conflict.  wk of 7/17 She had appt with radiology on 7/14, continues " "to work with her team due to LT, cancer and rash.  Virginia told group she is dealing with some news, medically, but working with her Drs.  She said she isn't wanting to share a whole lot, until she gethers her information, but she did want group to know she has support and appreciates just being able to share what she did  wk 7/10Jasmyn reports rash and working with , also had CT and MRI related to ongoing care for cancer and need for liver.  wk of 7/3  I get an infusion 1x per month and usually by week 3, I feel more sore and more tired, but I am grateful for the infusions.  \"no concerns beyond ongoing liver cancer and Crohns\"  wk of 6/12 She saw Jazmin Barrios at Urgent care for skin issues.  She also said she met with her GI practioner  She reports having liver cancer and Crohn's disease.  \"right now I am doing pretty well  Outside medical appointments this week (list provider and reason for visit)on 9/15 medical appt with Jeannette Cervantes gastroenterologist   8/29 Surgery with care team for cancer  wk of 8/14 No concerns I have my pre op eval with Dr. Glass, for my upcoming surgery   Wk of 8/7 No appts this week   wk of 7/31: no appts.  wk of 7/24 she saw surgeon Junior Bonilla, she is dealing with her liver tumor  wk of 7/17 She had appt with radiology, Dr. Shah,on 7/14, continues to work with her team due to LT, cancer and rash  : wk of 7/6 she saw Susanna RAZA for rash on leg.  Had a PeTH test 7/7, see below. No issues  : 6/13 she saw Jazmin Meng PA-C for a flare up of her psoriasis likely related to her other medical issues, per client     Current Medications & Medication Changes:  Current Outpatient Medications   Medication    ammonium lactate (AMLACTIN) 12 % external cream    clobetasol (TEMOVATE) 0.05 % external cream    furosemide (LASIX) 40 MG tablet    inFLIXimab (REMICADE) 100 MG injection    Multiple Vitamins-Minerals (MULTIVITAMIN & MINERAL PO)    senna-docusate (SENOKOT-S/PERICOLACE) " 8.6-50 MG tablet    spironolactone (ALDACTONE) 100 MG tablet    triamcinolone (KENALOG) 0.1 % external cream    UNABLE TO FIND     No current facility-administered medications for this encounter.     Facility-Administered Medications Ordered in Other Encounters   Medication    BREEZA Lemon-Lime flavored oral liquid for Neutral Abdominal/Pelvic Imaging 1,000 mL    hyoscyamine (LEVSIN/SL) sublingual tablet 125 mcg     Medication Prescriber:  Linda Macdonald  Taking meds as prescribed? Yes  Medication side effects or concerns:  no        Dimension 3: Emotional/Behavioral Conditions & Complications -   Previous Dimension Ratin  Current Dimension Ratin  PHQ2:       10/4/2023     4:00 PM 2023     2:00 PM 9/15/2023     9:12 AM 2023     5:00 PM 2023     8:00 AM 2023     1:34 PM 2023    10:00 AM   PHQ-2 (  [employer])   Q1: Little interest or pleasure in doing things 0 0 0 0 0    0 0 0   Q2: Feeling down, depressed or hopeless 0 0 0 0 0    0 0 0   PHQ-2 Score 0 0 0 0 0    0 0 0   Q1: Little interest or pleasure in doing things     Not at all     Q2: Feeling down, depressed or hopeless     Not at all     PHQ-2 Score     0        GAD2:       2023     9:54 AM 2023    10:00 AM 2023     8:00 AM 2023     5:00 PM 2023     2:00 PM 10/4/2023     4:00 PM   TAINA-2   Feeling nervous, anxious, or on edge 1 1 0    0    0    0    0 1 0 1   Not being able to stop or control worrying 1 0 0    0    0    0    0 0 0 1   TAINA-2 Total Score 2 1 0    0    0    0    0 1 0 2     PROMIS 10-Global Health (all questions and answers displayed):       2023     9:58 AM 2023     7:32 PM 2023    10:00 AM 2023     8:02 AM 2023     5:00 PM 2023     2:00 PM 10/4/2023     4:00 PM   PROMIS 10   In general, would you say your health is: Good Good  Good      In general, would you say your quality of life is: Good Good  Very good      In general, how would you rate your physical  health? Fair Fair  Fair      In general, how would you rate your mental health, including your mood and your ability to think? Good Good  Very good      In general, how would you rate your satisfaction with your social activities and relationships? Good Good  Excellent      In general, please rate how well you carry out your usual social activities and roles Good Good  Very good      To what extent are you able to carry out your everyday physical activities such as walking, climbing stairs, carrying groceries, or moving a chair? Completely Completely  Mostly      In the past 7 days, how often have you been bothered by emotional problems such as feeling anxious, depressed, or irritable? Sometimes Rarely  Rarely      In the past 7 days, how would you rate your fatigue on average? Mild Mild  Mild      In the past 7 days, how would you rate your pain on average, where 0 means no pain, and 10 means worst imaginable pain? 3 3  3      In general, would you say your health is: 3 3 3 3    3    3    3    3 3 4 4   In general, would you say your quality of life is: 3 3 4 4    4    4    4    4 3 4 4   In general, how would you rate your physical health? 2 2 3 2    2    2    2    2 3 3 3   In general, how would you rate your mental health, including your mood and your ability to think? 3 3 4 4    4    4    4    4 4 5 4   In general, how would you rate your satisfaction with your social activities and relationships? 3 3 5 5    5    5    5    5 4 5 4   In general, please rate how well you carry out your usual social activities and roles. (This includes activities at home, at work and in your community, and responsibilities as a parent, child, spouse, employee, friend, etc.) 3 3 5 4    4    4    4    4 4 4 4   To what extent are you able to carry out your everyday physical activities such as walking, climbing stairs, carrying groceries, or moving a chair? 5 5 4 4    4    4    4    4 3 4 3   In the past 7 days, how often have you  "been bothered by emotional problems such as feeling anxious, depressed, or irritable? 3 2 2 2    2    2    2    2 2 2 1   In the past 7 days, how would you rate your fatigue on average? 2 2 2 2    2    2    2    2 2 2 1   In the past 7 days, how would you rate your pain on average, where 0 means no pain, and 10 means worst imaginable pain? 3 3 4 3    3    3    3    3 2 3 2   Global Mental Health Score 12 13 17 17    17    17    17    17 15 18 17   Global Physical Health Score 15 15 14 14    14    14    14    14 14 15 15   PROMIS TOTAL - SUBSCORES 27 28 31 31    31    31    31    31 29 33 32     Mental health diagnosis none  Date of last SIB:  never  Date of  last SI:  never  Date of last HI: never  Behavioral Targets:  see tx plan  Risk factors:  Client dealing with liver disease/cancer and Crohns, client needs a new liver,   Protective factors:  spirituality, forward/future oriented thinking, safe and stable environment, regular physical activity, living with other people and structured day  Current MH Assignments:  see tx plan      Narrative: wk of 10/2 She completed PHQ-2, TAINA-2, and PROMISE and she expressed no need for further discussion at this time.  Client took part in a discussion of trauma as associated with addiction and was introduced to the idea of radical acceptance. She participated in a \"[place]\" guided visualization which she described as helpful.   wk of 9/25: no thoughts of self harm. On likert scale 1-low and 10 -high:   Anxiety: 0; depression: 0; stress: 0   wk of 9/18 Today I had Virginia answer 3 questions before doing the daily reading focused on acceptance, as it related to self and to what life presents. We had a long discussion on This.   \"no thoughts of self harm or concerns\"  on likert scale 1-low and 10 -high:   Anxiety: 1  stress: 1  depression 1  issues to discuss and helpful coping: talking with my care team, using breath work. Talking to my .   She said she has not felt " "like eating much, but the positive of it is she has lost 10 pounds.  Virginia  completed PROMIS, scoring 33. TAINA-2, scoring 0, and PHQ-2, scoring 0.  She said she sometimes has worries in her life, but overall feels blessed to not have a lot of anxiety or depression.   wk of 9/11  : \"no thoughts of self harm or concerns\"  on likert scale 1-low and 10 -high:   Anxiety:  2  stress: 1 depression 0  issues to discuss and helpful coping:  right now resting  wk of 8/28, RR changed to 0 from 1.  Virginia continues to report healthy coping for anxiety symptoms.  She remains optimistic and finds using tools learned are helpful, as is discussion with supportive people.  : \"no thoughts of self harm or concerns\"  on likert scale 1-low and 10 -high:   Anxiety:  7  stress: 1 depression 2     Virginia will be having her surgery tomorrow.  She discussed \"anxiety mireya high, yet I am hopeful\"  She said she has done all she can with her health and not drinking and preparing herself mentally.  She said she was glad she had group to attend and gets \"immense support\"  wk of 8/21/2023 She reports her anxiety is down and she experiencing no stress or depression. Client worked on her Dimension Three goals this week as she learned about and discussed the topics of Shame, Stigma and Mindfulness.     wkof 8/14  Ways I can think differently: listen to my critical voice and change my thoughts  Ways I can do things differently:  talk to myself like I would a friend.  \"no thoughts of self harm or concerns\"  on likert scale 1-low and 10 -high:   Anxiety:  0  stress: 0 depression 0  issues to discuss and helpful coping:  doing fun things, attending this group, being sober  wk of 8/7 ratings on likert scale 1-low and 10 -high: Anxiety: 2 stress: 1 depression 2. She participated in discussion on \"change the channel\" when getting into an anxiety loop. \"It is a simple way to do something more productive. [place] actively participated in discussion on Thought " "distortions and discussed which ones are the ones she actively wants to focus on for the time being: catastrophising and prediction.  She discussed  two situations where the techniques could be helpful, including: Should's and musts and not setting up unrealistic expectations of how she or life should be.  I want to remind myself \"until someone has walked a mile in my shoes, they don't get to  me and the same goes for me with them.  Virginia actively participated in meditation and said it was good to take that time to be.  \"I liked it\"  wk of 7/31  she participated in psychoeducation on Understanding my anxiety   This topic helped client to gain awareness of thoughts and feelings associated with anxiety and act with awareness when feeling anxiety and use helpful coping.  Client did actively participate in group discussions, and was able  to identify anxious thoughts, physical feelings when having anxiety: racing heart, shaking, confusion  \"why me\"  She was able to  identify 2 helpful coping methods: breath, talk with her  who is a comfort, stop the story telling/negativity when aware.  Virginia discussed that her tumor is shrinking and she is \"cautiously optimistic\". She said she wanted to share that she has many feelings associated with this, \"it even could mean I won't need a LT, but that remains to be seen\"  I told Virginia she has been shown how with our spirituality and belief, sometimes things can happen that were never anticipated.  She said \"so true\" I never had an idea that was even a part of my hope\"      Wk of 7/24- client denies thoughts of self-harm. On Likert scall 1-low and 10-high client rates anxiety 1  depression 0 and stress 2. She shared her new diagnosis of tumor growth has impacts stress and anxiety.   wk of 7/17: no thoughts of self harm or concerns\" on likert scale 1-low and 10 -high:   Anxiety:  5  stress: 5 depression 1  issues to discuss and Helpful coping:  All related to my medical, but " "I have great medical team and family support.  Client said of the reading:I like the ongoing discussions of shame and this reading brought me awareness that I have some to deal with regarding the nature of why many get liver cancer.       wk of 7/10:  She was able to repeat the steps of 5 senses, as a review.   \"no thoughts of self harm or concerns\".  Clients feedback to group member  on likert scale 1-low and 10 -high:   Anxiety:  2  stress: 2  depression 2  issues to discuss and Helpful coping: talking with you guys.  I had a craving that was pretty big this week.  Psychoeducation/Skills The thinking-feeling connection. This topic will address information from the [facility]ventions about how our thoughts influence our feelings.  Client was able to define automatic thoughts and make a connection with some that she has.   She said that she has been continueing to gain awareness of her beliefs vs perceptions vs what is a thought and what is a feeling.  Discussed that reminding herself it is ok to have thoughts and feelings, but they are not facts for other people.    Client participated in lovingkindness meditation and said it was just what she needed today and it that it was helpful and grounding.  \"Thank you for doing it\"    wk of 7/3: Clients participated in 5 senses psychoeducation discussion, participated in guided practice and was able to name 5 parts, and said she feels it will be a helpful tool  \"no thoughts of self harm or concerns\".   I discussed the research on gratitude journals and expressed she has seemed like she has an \"attitude of gratitude\".  She said she feels blessed and hopes she does.  on likert scale 1-low and 10 -high:   Anxiety:  0  stress: 0 depression 0  issues to discuss and Helpful coping: I am learning to say no, and that boundaries are good    wk of 6/26  Attended MH skills group with Darinel Au.    She participated in psychoeducation on Core beliefsThis topic " "will discuss core beliefs, as a lens through which we see life and how that affects our choices.  Virginia said the discussion on core beliefs is something she will spend some time on, as the awareness is helpful.  I told her we will follow up on Wednesday with more that can be helpful when we notice harmful core beliefs.   \"no thoughts of self harm or concerns\"  on likert scale 1-low and 10 -high:   Anxiety:  1 stress: 1 depression 0  issues to discuss and Helpful coping: learning about self, anxiety and stress  wk of 6/19 \"no thoughts of self harm or no concerns\".  She reports on likert scale 1-low and 10 -high:   Anxiety:  1  stress: 1 depression 0  issues to discuss and Helpful coping: getting to know people better in group, hearing other's helpful coping.  Attended MH skills group with Darinel Au.  We discussed anticipating some situations and \"gearing up\" by practicing deep breathing. I reminded client of previous discussions where beliefs often guide how we feel about certain situations, but that we can reframe them, or look at them for the particular situation.      Wk of 6/12  I am feeling much better and less anxiety about group attendance as I continue to attend and get to know others. \"no thoughts of self harm or concerns\". On likert scale 1-low and 10 -high:   Anxiety:  1  stress: 1 depression 1  issues to discuss and Helpful coping:  Breathing, getting to others in group  Wk of 6/6 \"no thoughts of self harm\"  Attended MH skills group with Darinel Au  Week of 5/31/23 :Client reports feeling anxious due to first treatment experience, not knowing what to expect, her  Mother's declining health-needing a placement for higher level of care, spouse recently lost his job-need to obtain COBRA insurance and unable to care for grandchildren because of attending treatment. Client was saw Traci Aguilar and has not seen them in about 6 weeks. To follow up with primary counselor re: seeing " "psychotherapist or return to [facility].  Client rates the following on a scale of 1 (low) to 10 (high):  Anxiety- 5  Depression -1  Stress- 5 as a result of above concerns.   Client participated in psychotherapy/education on self-care, introduction to relaxation meditation-tensing and relaxing muscles, and sleep hygiene.  Current Mental Health symptoms include: . Active interventions to stabilize mental health symptoms this week :   At SI her PHQ-2 score was 0 and his TAINA-2 score was 1.  PROMISE 10 was  31:  She reports that at this time she is dealing with her mother's living situation and health issues. She reports that her father has passed away and that she is estranged from 1 sister.         Dimension 4: Treatment Acceptance / Resistance -   Previous Dimension Ratin  Current Dimension Ratin  RENEE Diagnosis:  Alcohol Use Disorder   303.90 (F10.20) Severe In a controlled environment  Commitment to tx process/Stage of change- contemplation  RENEE assignments - see tx plan      Narrative - wk of 10/2 [place] expressed understanding of limits of discussion in group, including politics and Confucianism. Counselor emphasized that  beliefs are ok, just sometimes how one express them might be unfair or sound judgemental. She said she is motivated for sobriety 10/10  wk of : motivation for sobriety =10 on likert scale 1-low and 10 -high. She reports accepting that she is an alcoholic.  wk of   on likert scale 1-low and 10 -high: 10 on motivation for sobriety. She   wk of   on likert scale 1-low and 10 -high: 10   motivation for sobriety 10.  Motivated to continue a life of sobriety, \"I am learning so much about self.  wk of 2023 Client expressed this week about her alcoholism that that she liikes that \"I'm getting better not as shameful.\" That for her it has helped to accept alcoholism, \"occurs as a disease.\"     wk of   [place]ia told group \"I know I am an alcoholic\" she said she is so glad to have " "had to find her way to the group, even if it was through health issues or she would still be doing damage.  motivation for sobriety; 10 she likes being sober and reports she likes learning and attending group.   wk of 8/7  motivation for sobriety 10.  Motivation is high to have quality of life  wk of 7/31  on likert scale 1-low and 10 -high:  motivation for sobriety: 10   WK of 7/24-motivation 10 on Likert scale.she expressed strong commitment to sobriety, see notes dated 7/25 for more details.  wk of 7/17:  on likert scale 1-low and 10 -high:  motivation for sobriety: 10+  wk of 7/10  My motivation for sobriety is 10/10\".  I think it is so important to attend and be dedicated to support in this group, and to \"say things outloud, so it takes the power away, as you say, Alana\"  wk of 7/3  on likert scale 1-low and 10 -high:  motivation for sobriety 10.  Virginia said Mondays session really stuck with her and she thought about a lot of things and is really understanding why attendance and building on the days and lessons is important.     wk of 6/26  sober support motivation:  2, but I think motivation will grow as I keep settling in and I am motivated 10/10 for sobreity.    Wk of 6/19:  Client reports motivation for sobriety is 10 (high) Motivation for attending community support 5 (on scale 1 low-10 high).   Virginia expressed to her group how helpful they are to her and how much she appreciates all that members shared about their stories.  She said she gains such helpful insight for self   Week of 6/12 [place] discussed that she has been late a couple times, but realized with 2 other members gone, how this speaks to the importance of being on time and valuing and respecting others time, too.   She will also miss tomorrows group due to a long awaited medical appt. Dim 4: on likert scale 1-low and 10 -high:  Motivation for sobriety:  10  motivation for sober support attendance: not doing yet  Wk of 6/6  Virginia also said she " "finds motivation for sobriety in her family and friends, \"but really deep down need to do this for myself\"  Told counselor and group how she didn't think she would get much out of tx and already is getting so much and is grateful for older adult group   Week of 23: Client reports motivation for sobriety is 10 (high) Motivation for attending community support 5 (on scale 1 low-10 high).  The patient is motivated, to explore treatment.  She said \"of course I am partly here to fulfill requirements for liver transplant, but I want to learn healthy coping for lifelong sobriety.      Dimension 5: Relapse / Continued Problem Potential -   Previous Dimension Ratin  Current Dimension Ratin  Relapses this week - None  Urges to use - None  UA results -   No results found for this or any previous visit (from the past 168 hour(s)).      Using ReSet Clem: N/A    Narrative- wk of 10/2: RR changed 20 1.  She reports having craving of 1 when having dinner with friends. \"I ordered a club soda, and indulged in conversation. She reports cravings at 1 of 10 triggered by watching football with drinking friends. She reports dealing with cravings by getting away from the group for a short time by taking a walk outdoors. Client was able to teach back information for urge surfing and said this is a good tool.  She was congratulated by writer and group peers for having obtained over 9 months of sobriety    wk of : cravings =1 on likert scale 1-low and 10 -high. Reported dinner with friends as a trigger but reported only minimal cravings. She reports engaging in conversation and ordering a club soda as helpful coping skills.  wk of   She participated in Psychoeducation/Skills Relapse Prevention (RENEE)  This topic gave a general overview of basic relapse prevention, including definitions of warning signs, triggers and cravings and the importance of addressing healthy coping skills for ongoing relapse prevention.Patient was " "able to  name 4 of their warning signs and triggers, including:  Too much time with others whom don't like to stop drinking, concerts/shows if I don't have a plan  Patient was able to name 4 healthy coping mechanisms for dealing with their warning signs and triggers: including: making a plan of structure  wk of 9/11 Sights, sounds and situations that are triggers : concerts and comedy shows, but support, discussion and a fun NA drink are how I deal with it.   Virginia said each day she is healing more from surgery but is still quite sore.  \"I have had absolutely no cravings or desire to use alcohol\".   Gone week of 9/4 for surgery   wk of 8/28  cravings, warning signs, triggers: none I went to a [employer] game and had no cravings.\"   Wk of 8/21/2023 She reports having no cravings to drink this week and having a \"wonderful week.\"       Wk of 8/14   lient discussed a big warning sign for self is thinking I can drink again, especially socially.  II think about  it regularly.  Group discussed that awareness of this is good and it is likely normal to do this.  I encouraged her to continue to discuss this and down the road to keep in mind with sponsor, therapist, etc. And work through it.  I told her it is really good she can say this outloud.   Something I am doing well: having plans of structure, especially when around situations I used to drink in     any cravings, warning signs, triggers: the [employer] concert could have been but wasn't .  I have supportive people and that helps, but so does the realization of my use patterns and how I feel better not using  wk of 8/7 Virginia said that a trigger for her was going to the casino for a comedy show. \"I haven't been there since starting tx\"  My  and I just kept walking and he also said \"I got you\".  She said her cravings only lasted a couple minutes.  We discussed how this can be common and some group members said they cannot go to the same places at all because of that kind of " "trigger.  She said \"I get it, and will continue to be aware of situations like this   wk of 7/31   cravings, warning signs, triggers: None this week   WK of 7/24-client denies triggers, cravings or warning signs this past week.  wk of 7/17:  cravings, warning signs, triggers:i went to a musical festival, had a few cravings, but also had insight when watching others use, \"glad it isn't me\"  Wk of 7/10 Had a PeTH test 7/7, see above. No issues. She discussed the Serenity Prayer and that she finds it helpful.  She also discussed having cravings at a 4 of 10 when cooking, and what she found helpful:  Processed, talked with , talk with group today about it.   Virginia said she really had quite a learning week with events in group, discussions and assignments.  \"My heart goes out to the new person the other day, but also I had some anger about all the disruptions and negativity from her\"  \"I am ready to move on, and learn what I learned, and know that could be any one of us\"  wk of 7/5  cravings, warning signs, triggers: none.  My adult kids are caring and asked if I thought I would be OK going to [facility], as there is drinking there.  She told them she appreciates the concern, but feels with them and little to no triggers, for quite some time, she didn't feel it was a problem.  Virginia said she had a wonderful time with her family and is not concerned about use over 4th of July holiday either, she has a good plan for spending time with family and focusing on good food.   wk of 6/26  Dim 5: cravings, warning signs, triggers: none.  Wed reading was focused blame.  It said blaming is hiding, that when we blame others we try to hide our character defects.  We discussed the meaning of character defects. \"I was in big denial for a long time, it took being told I would need a new liver for me to really realize what drinking was doing. \"it hit me like a ton of bricks when I finally really heard it\"  wk of 6/19  She " "participated in  Psychoeducation/Skills: Self-Care.  Client s completed self-care assessment and described self-care as feeling better physically. Client identified physical self-care they do well- attending medical appointments, one they do poorly or needs improvement- eating healthy food and barriers that prevent themselves from self-care - cost of food is expensive, the shopping and prep.  This topic will give a general overview of self-care: physical, environmental, emotional, social and spiritual. It will explore importance of self-care and the impact on recovery.   Wk of . Dim 5: warning signs, triggers and cravings: none   helpful coping: attending group. Examples of benefits of drinking or drug use, or other behaviors Physical, Social mental or emotional: confidence, escape from reality, \"friends\"     Main drawbacks connected to these: not real friends that only want to drink together.  Headaches and slurred speech.  Remoarse  Benefits connected to abstinence from addictive behaviors:more able to do me and be me, I enjoy trud friends, I remember things better and will have better memories      Drawbacks you see connected with abstinence from these:  Wk of she participated in the daily reading and said she liked it.  Virginia also gave meaningful feedback to client who discussed use episode: \"it could be anyone of us\"  She said this is important and she is learning from others sharing.  Week of 23: Client denies riggers or warning signs this past week. She said she liked the saying \"it is just as easy to create good habits as bad ones\"   Patient reports family members and her  are concerned about their substance use.  Patient reports their recovery goals are \"abstinence forever.\"  She has had no previous detoxes or treatments and needs to gain insight into healthy coping for relapse prevention.      Dimension 6: Recovery Environment -   Previous Dimension Ratin  Current Dimension Rating:  " "2  Family Involvement - not at this time  Summarize attendance at family groups and family sessions - NA  Family supportive of treatment?  Yes    Community support group attendance - no  Recreational activities - some camping, cooking and boating    Narrative - wk of 10/2  . [place]  participated in discussion of how addiction affects relationships and said: things that are hard are  even more difficult\"   wk of 9/25: sober support motivation: 4 on likert scale 1-low and 10 -high. She denies attending any sober support meetings. Client participated in an interactive educational session and learned about and discussed \"Community Based, Sober Support. this week. Client learned about 14 community based sober support meeting options and shared that she is interested in attending community based sober support meetings, that she only wants to attend the in person meetings not Zoom meetings. She shared after the discussions on the different types of sober support meetings that the one she \"liked the Rajendra and the 12 Steps\" meeting.     wk of 9/18  sober support motivation:  5, but I did make a call to a friend who has been sober 13 years and we are going to meet in a few weeks .   we discussed focused on self care over the next days and She intends to rest, babysit her grandkids with her  overnight, see her sister whom she has not seen for 4 months.     wk of 9/11 sober support motivation:  5, but I will look into SMART Recovery. She participated in Psychoeducation/Skills Goal Setting (RENEE)  This topic will give a general overview of short, intermediate and long term goals including the value of goal setting. She completed her goal setting worksheet and discussed things that can get in the way of her goal: others that suck energy out of her, old thinking about how nice a drink would be  Plan of structure for weekend: She said she will go camping with family this weekend in their new RV, she also has to rest a lot.  \"I " "love football and will watch most any game\"  absent wk o f9/4  wk of 8/28:\" I have a great deal going on with my upcoming surgery, will likely look into sober support when things settle down.  Right now my group is support\"  wk of 8/21 Client worked on her dimension 6 goals this week as she learned and discussed more about sober support. She continues to report a low desire to attend outside community based sober support meetings, however she does appear to be open to learning more about it.     wk of 8/14 Virginia discussed that she went to [place] with friends who were drinking, but that they all said they would not need to drink. She said she appreciated that but it really didn't bother her in the least, as she likes being sober. Group discussed this type of situation and some group members said it would have been hard for them. Virginia said if it ever is, she will change what she does or ask them not to use. \"I have very dear friends and I know they mean it when they say the don't need to drink around me.  sober support motivation: 5   8/7 \"I am at 5 on wanting to attend community support, but find group support very helpful!  wk of 7/31  sober support motivation:  I have so much going on with medical and this meeting 3x per week, that realistically, I will not likely look into community support until after my surgery at the end of the month.   WK of 7/24-client's support are her family, she is not attending support groups and motivation to attend is 2 on Likert scale. Client shared she has learned this week is this is a continuous journey and staying positive is huge. To support her sobriety this week she will continue to surround herself with good people. She is grateful for home, shelter and stability.  wk of 7/17:  sober support motivation: this group and my family.     wk of 7/10: \"I am motivated for outside support at 2/10, but when I get done with Tuesday group, I think my motivtiaon will be higher\"  wk of 7/5   We " "discussed \"The power of Solitude\" and how spending time alone with ourselves may not be easy or even desirable, it it is key to getting to know self.  I emphasized that studies tell us we need each other to survive and be happy, but also when we loose the ability to be alone with ourselves, sometimes our overstimulated nervous systems suffer from no place to rest and recharge.  She said this is good to discuss, as she may even consider doing it a bit more, as it \"really does recharge me\" She said she is pretty good about finding alone time and also uses journaling.  I asked client how this applies to recovery and treatment:She said she will often take a topic from group, or a question and journal about.  She said it really helps   Wk of 6/26. She said sober support motivation is  2, but I think motivation will grow as I keep settling in  I talked with clients about taking responsibility for our actions, so we can be empowered and own our life and also change if we need to. We discussed resources including AA, SMART Recovery.  I encouraged clients to start exploring meetings.  Wk of 6/19.  Virginia reports with having group 3x per week, it is a good deal of support, \" I will continue to learn about peer support\"  wk of 6/12client discussed resources sober support, including AA, Liver Transplant support and [place] recovery connection  wk of 6/6  goals for the wknd: look into volunteering at [facility] for writing card.  \"no community support attendance at this time\"   Client is not currently attending community sup[ort and does not have a sponsor. Client's supportive people are her family. Client shared she has friends and has not talked with many about her current situation, because \"I am a private person,\" (treatment and needing transplant).  Patient reports they are involved in community of holden activities    They reports spirituality impacts recovery in the following ways:  \"There was some changes as far as .  We do " "belong to a Quaker but it's been a number of years since we've been.\"  Patient believes her spirituality and sobriety are connected. Patient reported having 3 children, all adults.  Patient has 2 grandchildren.    She lives with her spouse and son. recreational/leisure activities: camping, cooking, boating, playing board games and card games.  Patient is currently disabled due to her Crohn's Disease.  Patient reports their income is obtained through SSDI disability; spouse.  Patient does not identify finances as a current stressor.         Progress made on transition planning goals: just began tx    Justification for Continued Treatment at this Level of Care:  No previous tx, needs 6 months of sobriety to be eligible for a transplant. Recent surgery and she may not need transplant, but reports she wants sobriety for her life.  Needs to learn long term relapse prevention, she reports finding that attending group is helpful for her and she is learning.She said she did not think she would get much out of this but is getting so much support and understanding of healthy coping.  She is not attending community support, at this time, \"but I find my group so helpful.  She is working on goals and assignments.  She gets regular PEth  tests, and there have been no indications of alcohol use.    Treatment coordination activities this week: wk of 9/25 staffed with SOLANGE Ponce. wk 9/18 Celia  on LT asked her time frame to be done, since Virginia just had surgery and there is no issue on the horizon about getting a new liver   wk 8/28 Reviewed notes from margaret Rowland  7/31 reviewed Onelia Singh notes and discussed client with her.  7/18  Discussed client info with sub Onelia Singh   Discussed client info with margaret Singh  Need for peer recovery support referral? No    Discharge Planning: Not at this time      Has vulnerable adult status change? No    Interdisciplinary Clinical Supervision including:  no    Are " Treatment Plan goals/objectives effective? Yes  *If no, list changes to treatment plan:    Are the current goals meeting client's needs? Yes  *If no, list the changes to treatment plan.  Plan:  Continue per Master Treatment Plan    *Client agrees with any changes to the treatment plan: Yes  *Client received copy of changes: Yes  *Client is aware of right to access a treatment plan review: Yes     Staff Members Contributing To Weekly Review:      SOLANGE Hamilton 9/27/2023  SOLANGE Jamil 9/25/2023

## 2023-10-06 ENCOUNTER — TELEPHONE (OUTPATIENT)
Dept: BEHAVIORAL HEALTH | Facility: CLINIC | Age: 58
End: 2023-10-06
Payer: MEDICARE

## 2023-10-06 NOTE — TELEPHONE ENCOUNTER
----- Message from SOLANGE Grimaldo sent at 9/28/2023  8:52 AM CDT -----  Regarding: add Ph 2 sessions for Mondays and Wednesdays only (not Tuesdays, she has completed)  Scheduling Request    add Ph 2 sessions for Mondays and Wednesdays only (not Tuesdays, she has completed tues sesions)        Patient Name:STEVEN MCNAMARA [[id number]]  Location of programming: [facility]    Group: [id number] on Monday, (not Tuesday), and Wednesday, at 12:00 pm: PM to 2:00 pm    Attending Provider (MD): Dr. De La Rosa    Number of visits to be scheduled: 14   Duration of Appointment in minutes: 120 minutes  Visit Type: in person  Thank You,  Alana Dodd M.S., SOLANGE, LPC  Lead Counselor  [phone]

## 2023-10-09 ENCOUNTER — HOSPITAL ENCOUNTER (OUTPATIENT)
Dept: BEHAVIORAL HEALTH | Facility: CLINIC | Age: 58
Discharge: HOME | End: 2023-10-09
Attending: FAMILY MEDICINE
Payer: COMMERCIAL

## 2023-10-09 PROCEDURE — H2035 A/D TX PROGRAM, PER HOUR: HCPCS | Mod: HQ

## 2023-10-09 NOTE — GROUP NOTE
Group Therapy Documentation    PATIENT'S NAME: Abiola Matute  MRN:   [id number]  :   1965  ACCT. NUMBER: [id number]  DATE OF SERVICE: 10/09/23  START TIME: 12:00 PM  END TIME:  2:00 PM  FACILITATOR(S): Judi Dodd LADC  TOPIC: BEH Group Therapy  Number of patients attending the group:  5  Group Length:  2 Hours    Group Therapy Type: Addiction and Psychoeducation    Summary of Group / Topics Discussed:    Balanced Lifestyle , Boundaries, Forgiveness, Grief & Loss, Leisure Exploration/Use of Leisure Time, Mindfulness, Resentments, and Stress Management        Attestation: Dr. De La Rosa - Provides oversight and supervision of care.     We checked in on logistics and any updates from last week and anything needed to discuss.  Clients participated in stages of change psychoeducation below.  Clients checked in on all dimensions, giving and receiving feedback from each other and counselor.   Many topics of good self care and old beliefs and patterns that no longer are effective were discussed.  Clients all named what they have learned about self in group today or in the last week.     Readiness to change (stages of change)      This topic will give a general overview of stage of change models and how they apply to the personal experience of each patient.     Objective(s):   -Client will become familiarized with all 5 stages of change   -Client will identify which stage of change they believe they are currently in.   -Client will identify at least 2 ways in which they could increase their stage of change.      Structure:   -Provide psychoeducation on readiness to change and stages of change.   -Facilitate group discussion around each patient s reasons to change and current place in the model(s).   -Use teach-back techniques to ensure patients  understanding.   -Provide patients with handouts to enhance learning.     Expected therapeutic outcomes:    -Understand change as an essential and non-linear process.  "  -Reduce confusion about ambivalence.   -Enhanced motivation to change.     Therapeutic outcome(s) measured by:   -Client s ability to teach-back information learned in group.   -Client s demonstration of learning by identification of their own stage of change.       Group Attendance:  Attended group session    Patient's response to the group topic/interactions:  cooperative with task, discussed personal experience with topic, and expressed readiness to alter behaviors    Patient appeared to be Actively participating and Attentive.        Client specific details:  Virginia said she continues to feel better and better as she continues to heal from her sugery and feels blessed.  She said she feels like she has taken a turn in \"even more acceptance of living a life of sobriety\". Group congratulated her on her movement and acceptance and hard work.  Today's session focused on dim 4 stages of change, and \"I am your disease\" and check in of all dimensions.    Dim 1: ASHISH: no change reported   Dim 2: medical issues or appts: None, I keep healing from my surgery.  Dim 3: \"no thoughts of self harm or concerns\"  on likert scale 1-low and 10 -high:   Anxiety: 0  stress: 1  depression 0      issues to discuss and helpful coping: meeting and talking with others  Dim 4 on likert scale 1-low and 10 -high:  10 on  motivation for sobriety  Dim 5: cravings, warning signs, triggers: \"none this week\"  Dim 6 sober support motivation:  5, but I am opening to this.    Client participated in Stage of change discussion, received handout on stages and also listed which stage she was in with:  Tx: action   sobriety/abstinence:  action    sober support: determination      community support:  contemplation     my individual goal: action    P)  Continue working on goals and where you want to be with stages of change.    Attestation:  Dr. De La Rosa - Provides oversight and supervision of care.  Judi Dodd, MS, [facility], " LPC

## 2023-10-10 NOTE — PROGRESS NOTES
[facility] Weekly Treatment Plan Review      Attestation:   Dr. De La Rosa - Medical Director - Provides oversight and supervision of care.        Date Span: Monday: 10/9/23 through Fuad 10/15/23     Date     Group Therapy 2 hours   2 hours   0 hours 0 hours       Individual Therapy                Family Therapy                 Psychoeducation                 Other (Specify)                   Client had no absences this week     Wk o f  Virginia is excused this week due to holiday and to recovering from her surgery last week.  All notes below are based on last attendance and will be updated when client returns next week    Clients individual sessions noted below. Client had 1 excused absence this week, due to having surgery.    Client has no absences this week    Client has one excused absence this week , due to medical appts.  Client excused from group , due to medical appointment.     Program Running Totals: (No Phase 1 IOP due to this program being only OP level of care)  Total # of Phase 2 OP Group Sessions: 37 for 74  Virginia gets 60 sessions for 120 hours at this time and has 45  Counting her individual she has used 30  sessions and 53 hours  Total # of Phase 3 OP Recovery Management Group Sessions: None    Total # of 1:1 Sessions:  1 hour BALWINDER   1 hour 5/30  7/3,                                     Darinel , , ( & -Onelia)                    Weekly Treatment Plan Review     Treatment Plan initiated on: .    Dimension1: Acute Intoxication/Withdrawal Potential -   Previous Dimension Ratin  Current Dimension Ratin  Date of Last Use 22  Any reports of withdrawal symptoms - No  wk of : Client reports no change in sobriety date and exhibits no sx of intoxication or withdrawal.      Dimension 2: Biomedical Conditions & Complications -   Previous Dimension Ratin  Current Dimension Rating:   "2    Medical Concerns: wk of 10/9 \"still healing from surgery, but doing very well now\"  wk of 10/2 \"still healing from surgery, but good\"  wk of 9/25: Client denies any new or worsening medical issues.  wk of 9/18  Virginia discussed that she was not aware before this surgery that chances are high she will still need a transplant someday.  She said she went through a range of emotions, as she was not prepared for this. \"I had to go through what I did, and now feel better about keeping my life on the positive and 'I will deal with that bridge when I get to it\".  Group expressed empathy for all she has been through and also her positive attitude of gratitude.    I told her Celia,  on LT asked her time frame to be done, since Virginia just had surgery and there is no issue on the horizon about getting a new liver, I wanted to make sure I was understanding so as to know if Virginia and LT are good with normal schedule, which would have her completing about the end of November. Virginia met with Dr. Saavedra today and he said as far as anyone could ever see there is no thought or idea she will have a transplant.  Sometimes a person needs a new liver years from now, but things are looking good at this time.  Virginia said she has nothing special she wants to work on \"I keep learning from all that we do and I am sure building bonds with the others\"     wk of 9/11 \"no concerns, continuing to recovery from surgery\"  Surgery 8/29 for her cancer  /821/2023 Reports having no new or worsening health conditions. Reports having an appointment this week on 8/23/2023 with her Cardiologist.     wk of 8/14 No concerns I did  my pre op eval for my upcoming surgery with Dr. Bear and nurse Sakina Glass  wk of 8/7 No new concerns  wk of 7/31:  no, just good news that they may be able to remove all my tumore and I may not need a liver.  WK of 7/24-Client has an appointment with surgeon regarding increase in tumor. She is excused from programming " "due time conflict.  wk of 7/17 She had appt with radiology on 7/14, continues to work with her team due to LT, cancer and rash.  Virginia told group she is dealing with some news, medically, but working with her Drs.  She said she isn't wanting to share a whole lot, until she gethers her information, but she did want group to know she has support and appreciates just being able to share what she did  wk 7/10Jasmyn reports rash and working with , also had CT and MRI related to ongoing care for cancer and need for liver.  wk of 7/3  I get an infusion 1x per month and usually by week 3, I feel more sore and more tired, but I am grateful for the infusions.  \"no concerns beyond ongoing liver cancer and Crohns\"  wk of 6/12 She saw Jazmin Barrios at Urgent care for skin issues.  She also said she met with her GI practioner  She reports having liver cancer and Crohn's disease.  \"right now I am doing pretty well  Outside medical appointments this week (list provider and reason for visit)on 9/15 medical appt with Jeannette Cervantes gastroenterologist   8/29 Surgery with care team for cancer  wk of 8/14 No concerns I have my pre op eval with Dr. Glass, for my upcoming surgery   Wk of 8/7 No appts this week   wk of 7/31: no appts.  wk of 7/24 she saw surgeon Junior Bonilla, she is dealing with her liver tumor  wk of 7/17 She had appt with radiology, Dr. Shah,on 7/14, continues to work with her team due to LT, cancer and rash  : wk of 7/6 she saw Susanna RAZA for rash on leg.  Had a PeTH test 7/7, see below. No issues  : 6/13 she saw Jazmin Meng PA-C for a flare up of her psoriasis likely related to her other medical issues, per client     Current Medications & Medication Changes:  Current Outpatient Medications   Medication    ammonium lactate (AMLACTIN) 12 % external cream    clobetasol (TEMOVATE) 0.05 % external cream    furosemide (LASIX) 40 MG tablet    inFLIXimab (REMICADE) 100 MG injection    Multiple " Vitamins-Minerals (MULTIVITAMIN & MINERAL PO)    senna-docusate (SENOKOT-S/PERICOLACE) 8.6-50 MG tablet    spironolactone (ALDACTONE) 100 MG tablet    triamcinolone (KENALOG) 0.1 % external cream    UNABLE TO FIND     No current facility-administered medications for this encounter.     Facility-Administered Medications Ordered in Other Encounters   Medication    BREEZA Lemon-Lime flavored oral liquid for Neutral Abdominal/Pelvic Imaging 1,000 mL    hyoscyamine (LEVSIN/SL) sublingual tablet 125 mcg     Medication Prescriber:  Linda Macdonald  Taking meds as prescribed? Yes  Medication side effects or concerns:  no        Dimension 3: Emotional/Behavioral Conditions & Complications -   Previous Dimension Ratin  Current Dimension Ratin  PHQ2:       10/4/2023     4:00 PM 2023     2:00 PM 9/15/2023     9:12 AM 2023     5:00 PM 2023     8:00 AM 2023     1:34 PM 2023    10:00 AM   PHQ-2 (  [employer])   Q1: Little interest or pleasure in doing things 0 0 0 0 0    0 0 0   Q2: Feeling down, depressed or hopeless 0 0 0 0 0    0 0 0   PHQ-2 Score 0 0 0 0 0    0 0 0   Q1: Little interest or pleasure in doing things     Not at all     Q2: Feeling down, depressed or hopeless     Not at all     PHQ-2 Score     0        GAD2:       2023     9:54 AM 2023    10:00 AM 2023     8:00 AM 2023     5:00 PM 2023     2:00 PM 10/4/2023     4:00 PM   TAINA-2   Feeling nervous, anxious, or on edge 1 1 0    0    0    0    0 1 0 1   Not being able to stop or control worrying 1 0 0    0    0    0    0 0 0 1   TAINA-2 Total Score 2 1 0    0    0    0    0 1 0 2     PROMIS 10-Global Health (all questions and answers displayed):       2023     9:58 AM 2023     7:32 PM 2023    10:00 AM 2023     8:02 AM 2023     5:00 PM 2023     2:00 PM 10/4/2023     4:00 PM   PROMIS 10   In general, would you say your health is: Good Good  Good      In general, would you say your  quality of life is: Good Good  Very good      In general, how would you rate your physical health? Fair Fair  Fair      In general, how would you rate your mental health, including your mood and your ability to think? Good Good  Very good      In general, how would you rate your satisfaction with your social activities and relationships? Good Good  Excellent      In general, please rate how well you carry out your usual social activities and roles Good Good  Very good      To what extent are you able to carry out your everyday physical activities such as walking, climbing stairs, carrying groceries, or moving a chair? Completely Completely  Mostly      In the past 7 days, how often have you been bothered by emotional problems such as feeling anxious, depressed, or irritable? Sometimes Rarely  Rarely      In the past 7 days, how would you rate your fatigue on average? Mild Mild  Mild      In the past 7 days, how would you rate your pain on average, where 0 means no pain, and 10 means worst imaginable pain? 3 3  3      In general, would you say your health is: 3 3 3 3    3    3    3    3 3 4 4   In general, would you say your quality of life is: 3 3 4 4    4    4    4    4 3 4 4   In general, how would you rate your physical health? 2 2 3 2    2    2    2    2 3 3 3   In general, how would you rate your mental health, including your mood and your ability to think? 3 3 4 4    4    4    4    4 4 5 4   In general, how would you rate your satisfaction with your social activities and relationships? 3 3 5 5    5    5    5    5 4 5 4   In general, please rate how well you carry out your usual social activities and roles. (This includes activities at home, at work and in your community, and responsibilities as a parent, child, spouse, employee, friend, etc.) 3 3 5 4    4    4    4    4 4 4 4   To what extent are you able to carry out your everyday physical activities such as walking, climbing stairs, carrying groceries, or  "moving a chair? 5 5 4 4    4    4    4    4 3 4 3   In the past 7 days, how often have you been bothered by emotional problems such as feeling anxious, depressed, or irritable? 3 2 2 2    2    2    2    2 2 2 1   In the past 7 days, how would you rate your fatigue on average? 2 2 2 2    2    2    2    2 2 2 1   In the past 7 days, how would you rate your pain on average, where 0 means no pain, and 10 means worst imaginable pain? 3 3 4 3    3    3    3    3 2 3 2   Global Mental Health Score 12 13 17 17    17    17    17    17 15 18 17   Global Physical Health Score 15 15 14 14    14    14    14    14 14 15 15   PROMIS TOTAL - SUBSCORES 27 28 31 31    31    31    31    31 29 33 32     Mental health diagnosis none  Date of last SIB:  never  Date of  last SI:  never  Date of last HI: never  Behavioral Targets:  see tx plan  Risk factors:  Client dealing with liver disease/cancer and Crohns, client needs a new liver,   Protective factors:  spirituality, forward/future oriented thinking, safe and stable environment, regular physical activity, living with other people and structured day  Current MH Assignments:  see tx plan      Narrative: wk of 10/9   \"no thoughts of self harm or concerns\"  on likert scale 1-low and 10 -high:   Anxiety: 0  stress: 1  depression 0      issues to discuss and helpful coping: meeting and talking with others  wk of 10/2 She completed PHQ-2, TAINA-2, and PROMISE and she expressed no need for further discussion at this time.  Client took part in a discussion of trauma as associated with addiction and was introduced to the idea of radical acceptance. She participated in a \"[place]\" guided visualization which she described as helpful.   wk of 9/25: no thoughts of self harm. On likert scale 1-low and 10 -high:   Anxiety: 0; depression: 0; stress: 0   wk of 9/18 Today I had Virginia answer 3 questions before doing the daily reading focused on acceptance, as it related to self and to what life " "presents. We had a long discussion on This.   \"no thoughts of self harm or concerns\"  on likert scale 1-low and 10 -high:   Anxiety: 1  stress: 1  depression 1  issues to discuss and helpful coping: talking with my care team, using breath work. Talking to my .   She said she has not felt like eating much, but the positive of it is she has lost 10 pounds.  Virginia  completed PROMIS, scoring 33. TAINA-2, scoring 0, and PHQ-2, scoring 0.  She said she sometimes has worries in her life, but overall feels blessed to not have a lot of anxiety or depression.   wk of 9/11  : \"no thoughts of self harm or concerns\"  on likert scale 1-low and 10 -high:   Anxiety:  2  stress: 1 depression 0  issues to discuss and helpful coping:  right now resting  wk of 8/28, RR changed to 0 from 1.  Virginia continues to report healthy coping for anxiety symptoms.  She remains optimistic and finds using tools learned are helpful, as is discussion with supportive people.  : \"no thoughts of self harm or concerns\"  on likert scale 1-low and 10 -high:   Anxiety:  7  stress: 1 depression 2     Virginia will be having her surgery tomorrow.  She discussed \"anxiety mireya high, yet I am hopeful\"  She said she has done all she can with her health and not drinking and preparing herself mentally.  She said she was glad she had group to attend and gets \"immense support\"  wk of 8/21/2023 She reports her anxiety is down and she experiencing no stress or depression. Client worked on her Dimension Three goals this week as she learned about and discussed the topics of Shame, Stigma and Mindfulness.     wkof 8/14  Ways I can think differently: listen to my critical voice and change my thoughts  Ways I can do things differently:  talk to myself like I would a friend.  \"no thoughts of self harm or concerns\"  on likert scale 1-low and 10 -high:   Anxiety:  0  stress: 0 depression 0  issues to discuss and helpful coping:  doing fun things, attending this group, being " "sober  wk of 8/7 ratings on likert scale 1-low and 10 -high: Anxiety: 2 stress: 1 depression 2. She participated in discussion on \"change the channel\" when getting into an anxiety loop. \"It is a simple way to do something more productive. [place] actively participated in discussion on Thought distortions and discussed which ones are the ones she actively wants to focus on for the time being: catastrophising and prediction.  She discussed  two situations where the techniques could be helpful, including: Should's and musts and not setting up unrealistic expectations of how she or life should be.  I want to remind myself \"until someone has walked a mile in my shoes, they don't get to  me and the same goes for me with them.  Virginia actively participated in meditation and said it was good to take that time to be.  \"I liked it\"  wk of 7/31  she participated in psychoeducation on Understanding my anxiety   This topic helped client to gain awareness of thoughts and feelings associated with anxiety and act with awareness when feeling anxiety and use helpful coping.  Client did actively participate in group discussions, and was able  to identify anxious thoughts, physical feelings when having anxiety: racing heart, shaking, confusion  \"why me\"  She was able to  identify 2 helpful coping methods: breath, talk with her  who is a comfort, stop the story telling/negativity when aware.  Virginia discussed that her tumor is shrinking and she is \"cautiously optimistic\". She said she wanted to share that she has many feelings associated with this, \"it even could mean I won't need a LT, but that remains to be seen\"  I told Virginia she has been shown how with our spirituality and belief, sometimes things can happen that were never anticipated.  She said \"so true\" I never had an idea that was even a part of my hope\"      Wk of 7/24- client denies thoughts of self-harm. On Likert scall 1-low and 10-high client rates anxiety 1  depression " "0 and stress 2. She shared her new diagnosis of tumor growth has impacts stress and anxiety.   wk of 7/17: no thoughts of self harm or concerns\" on likert scale 1-low and 10 -high:   Anxiety:  5  stress: 5 depression 1  issues to discuss and Helpful coping:  All related to my medical, but I have great medical team and family support.  Client said of the reading:I like the ongoing discussions of shame and this reading brought me awareness that I have some to deal with regarding the nature of why many get liver cancer.       wk of 7/10:  She was able to repeat the steps of 5 senses, as a review.   \"no thoughts of self harm or concerns\".  Clients feedback to group member  on likert scale 1-low and 10 -high:   Anxiety:  2  stress: 2  depression 2  issues to discuss and Helpful coping: talking with you guys.  I had a craving that was pretty big this week.  Psychoeducation/Skills The thinking-feeling connection. This topic will address information from the [place] [facility] about how our thoughts influence our feelings.  Client was able to define automatic thoughts and make a connection with some that she has.   She said that she has been continueing to gain awareness of her beliefs vs perceptions vs what is a thought and what is a feeling.  Discussed that reminding herself it is ok to have thoughts and feelings, but they are not facts for other people.    Client participated in lovingkindness meditation and said it was just what she needed today and it that it was helpful and grounding.  \"Thank you for doing it\"    wk of 7/3: Clients participated in 5 senses psychoeducation discussion, participated in guided practice and was able to name 5 parts, and said she feels it will be a helpful tool  \"no thoughts of self harm or concerns\".   I discussed the research on gratitude journals and expressed she has seemed like she has an \"attitude of gratitude\".  She said she feels blessed and hopes she does.  on likert " "scale 1-low and 10 -high:   Anxiety:  0  stress: 0 depression 0  issues to discuss and Helpful coping: I am learning to say no, and that boundaries are good    wk of 6/26  Attended  skills group with Darinel Au.    She participated in psychoeducation on Core beliefsThis topic will discuss core beliefs, as a lens through which we see life and how that affects our choices.  Virginia said the discussion on core beliefs is something she will spend some time on, as the awareness is helpful.  I told her we will follow up on Wednesday with more that can be helpful when we notice harmful core beliefs.   \"no thoughts of self harm or concerns\"  on likert scale 1-low and 10 -high:   Anxiety:  1 stress: 1 depression 0  issues to discuss and Helpful coping: learning about self, anxiety and stress  wk of 6/19 \"no thoughts of self harm or no concerns\".  She reports on likert scale 1-low and 10 -high:   Anxiety:  1  stress: 1 depression 0  issues to discuss and Helpful coping: getting to know people better in group, hearing other's helpful coping.  Attended  skills group with Darinel Au.  We discussed anticipating some situations and \"gearing up\" by practicing deep breathing. I reminded client of previous discussions where beliefs often guide how we feel about certain situations, but that we can reframe them, or look at them for the particular situation.      Wk of 6/12  I am feeling much better and less anxiety about group attendance as I continue to attend and get to know others. \"no thoughts of self harm or concerns\". On likert scale 1-low and 10 -high:   Anxiety:  1  stress: 1 depression 1  issues to discuss and Helpful coping:  Breathing, getting to others in group  Wk of 6/6 \"no thoughts of self harm\"  Attended  skills group with Darinel Au  Week of 5/31/23 :Client reports feeling anxious due to first treatment experience, not knowing what to expect, her  Mother's declining health-needing a placement " "for higher level of care, spouse recently lost his job-need to obtain COBRA insurance and unable to care for grandchildren because of attending treatment. Client was saw Traci Aguilar and has not seen them in about 6 weeks. To follow up with primary counselor re: seeing psychotherapist or return to [facility].  Client rates the following on a scale of 1 (low) to 10 (high):  Anxiety- 5  Depression -1  Stress- 5 as a result of above concerns.   Client participated in psychotherapy/education on self-care, introduction to relaxation meditation-tensing and relaxing muscles, and sleep hygiene.  Current Mental Health symptoms include: . Active interventions to stabilize mental health symptoms this week :   At SI her PHQ-2 score was 0 and his TAINA-2 score was 1.  PROMISE 10 was  31:  She reports that at this time she is dealing with her mother's living situation and health issues. She reports that her father has passed away and that she is estranged from 1 sister.         Dimension 4: Treatment Acceptance / Resistance -   Previous Dimension Ratin  Current Dimension Ratin  RENEE Diagnosis:  Alcohol Use Disorder   303.90 (F10.20) Severe In a controlled environment  Commitment to tx process/Stage of change- contemplation  RENEE assignments - see tx plan      Narrative - Virginia said \"I am your disease\" made her tear up and it is awakening\"  she said she is so much more accepting of her disease and not drinking, but if sh e wasn't this would sure be a motivator\"  wk of 10/9  she participated in psychoeducation on Readiness to change (stages of change).  This topic gave a general overview of stage of change models and how they apply to the personal experience of each patient.  She participated in discussion, received handout on stages and also listed which stage she was in with: Tx: action   sobriety/abstinence:  action    sober support: determination      community support:  contemplation     my individual goal: " "action  \"I am your disease\" and she said \"it is so powerful\"       wk of 10/2 [place] expressed understanding of limits of discussion in group, including politics and [employer]. Counselor emphasized that  beliefs are ok, just sometimes how one express them might be unfair or sound judgemental. She said she is motivated for sobriety 10/10  wk of 9/25: motivation for sobriety =10 on likert scale 1-low and 10 -high. She reports accepting that she is an alcoholic.  wk of 9/18  on likert scale 1-low and 10 -high: 10 on motivation for sobriety. She   wk of 8/28  on likert scale 1-low and 10 -high: 10   motivation for sobriety 10.  Motivated to continue a life of sobriety, \"I am learning so much about self.  wk of 8/21/2023 Client expressed this week about her alcoholism that that she liikes that \"I'm getting better not as shameful.\" That for her it has helped to accept alcoholism, \"occurs as a disease.\"     wk of 8/14  Virginia told group \"I know I am an alcoholic\" she said she is so glad to have had to find her way to the group, even if it was through health issues or she would still be doing damage.  motivation for sobriety; 10 she likes being sober and reports she likes learning and attending group.   wk of 8/7  motivation for sobriety 10.  Motivation is high to have quality of life  wk of 7/31  on likert scale 1-low and 10 -high:  motivation for sobriety: 10   WK of 7/24-motivation 10 on Likert scale.she expressed strong commitment to sobriety, see notes dated 7/25 for more details.  wk of 7/17:  on likert scale 1-low and 10 -high:  motivation for sobriety: 10+  wk of 7/10  My motivation for sobriety is 10/10\".  I think it is so important to attend and be dedicated to support in this group, and to \"say things outloud, so it takes the power away, as you say, Alana\"  wk of 7/3  on likert scale 1-low and 10 -high:  motivation for sobriety 10.  Virginia said Mondays session really stuck with her and she thought about a lot of things " "and is really understanding why attendance and building on the days and lessons is important.     wk of   sober support motivation:  2, but I think motivation will grow as I keep settling in and I am motivated 10/10 for sobreity.    Wk of :  Client reports motivation for sobriety is 10 (high) Motivation for attending community support 5 (on scale 1 low-10 high).   Virginia expressed to her group how helpful they are to her and how much she appreciates all that members shared about their stories.  She said she gains such helpful insight for self   Week of  Virginia discussed that she has been late a couple times, but realized with 2 other members gone, how this speaks to the importance of being on time and valuing and respecting others time, too.   She will also miss tomorrows group due to a long awaited medical appt. Dim 4: on likert scale 1-low and 10 -high:  Motivation for sobriety:  10  motivation for sober support attendance: not doing yet  Wk of   [place]ia also said she finds motivation for sobriety in her family and friends, \"but really deep down need to do this for myself\"  Told counselor and group how she didn't think she would get much out of tx and already is getting so much and is grateful for older adult group   Week of 23: Client reports motivation for sobriety is 10 (high) Motivation for attending community support 5 (on scale 1 low-10 high).  The patient is motivated, to explore treatment.  She said \"of course I am partly here to fulfill requirements for liver transplant, but I want to learn healthy coping for lifelong sobriety.      Dimension 5: Relapse / Continued Problem Potential -   Previous Dimension Ratin  Current Dimension Ratin  Relapses this week - None  Urges to use - None  UA results -   No results found for this or any previous visit (from the past 168 hour(s)).      Using ReSet Clem: N/A    Narrative- cravings, warning signs, triggers: \"none this week\" .  She said  \"I " "have even more acceptance of living a life of sobriety\". Group congratulated her on her movement and acceptance and hard work   wk of 10/2: RR changed 20 1.  She reports having craving of 1 when having dinner with friends. \"I ordered a club soda, and indulged in conversation. She reports cravings at 1 of 10 triggered by watching football with drinking friends. She reports dealing with cravings by getting away from the group for a short time by taking a walk outdoors. Client was able to teach back information for urge surfing and said this is a good tool.  She was congratulated by writer and group peers for having obtained over 9 months of sobriety    wk of 9/25: cravings =1 on likert scale 1-low and 10 -high. Reported dinner with friends as a trigger but reported only minimal cravings. She reports engaging in conversation and ordering a club soda as helpful coping skills.  wk of 9/18  She participated in Psychoeducation/Skills Relapse Prevention (RENEE)  This topic gave a general overview of basic relapse prevention, including definitions of warning signs, triggers and cravings and the importance of addressing healthy coping skills for ongoing relapse prevention.Patient was able to  name 4 of their warning signs and triggers, including:  Too much time with others whom don't like to stop drinking, concerts/shows if I don't have a plan  Patient was able to name 4 healthy coping mechanisms for dealing with their warning signs and triggers: including: making a plan of structure  wk of 9/11 Sights, sounds and situations that are triggers : concerts and comedy shows, but support, discussion and a fun NA drink are how I deal with it.   Virginia said each day she is healing more from surgery but is still quite sore.  \"I have had absolutely no cravings or desire to use alcohol\".   Gone week of 9/4 for surgery   wk of 8/28  cravings, warning signs, triggers: none I went to a [employer] game and had no cravings.\"   Wk of 8/21/2023 She " "reports having no cravings to drink this week and having a \"wonderful week.\"       Wk of 8/14   holley discussed a big warning sign for self is thinking I can drink again, especially socially.  II think about  it regularly.  Group discussed that awareness of this is good and it is likely normal to do this.  I encouraged her to continue to discuss this and down the road to keep in mind with sponsor, therapist, etc. And work through it.  I told her it is really good she can say this outloud.   Something I am doing well: having plans of structure, especially when around situations I used to drink in     any cravings, warning signs, triggers: the [employer] could have been but wasn't .  I have supportive people and that helps, but so does the realization of my use patterns and how I feel better not using  wk of 8/7 Virginia said that a trigger for her was going to the casino for a comedy show. \"I haven't been there since starting tx\"  My  and I just kept walking and he also said \"I got you\".  She said her cravings only lasted a couple minutes.  We discussed how this can be common and some group members said they cannot go to the same places at all because of that kind of trigger.  She said \"I get it, and will continue to be aware of situations like this   wk of 7/31   cravings, warning signs, triggers: None this week   WK of 7/24-client denies triggers, cravings or warning signs this past week.  wk of 7/17:  cravings, warning signs, triggers:i went to a musical festival, had a few cravings, but also had insight when watching others use, \"glad it isn't me\"  Wk of 7/10 Had a PeTH test 7/7, see above. No issues. She discussed the Serenity Prayer and that she finds it helpful.  She also discussed having cravings at a 4 of 10 when cooking, and what she found helpful:  Processed, talked with , talk with group today about it.   Virginia said she really had quite a learning week with events in group, discussions and " "assignments.  \"My heart goes out to the new person the other day, but also I had some anger about all the disruptions and negativity from her\"  \"I am ready to move on, and learn what I learned, and know that could be any one of us\"  wk of 7/5  cravings, warning signs, triggers: none.  My adult kids are caring and asked if I thought I would be OK going to [facility], as there is drinking there.  She told them she appreciates the concern, but feels with them and little to no triggers, for quite some time, she didn't feel it was a problem.  Virginia said she had a wonderful time with her family and is not concerned about use over 4th of July holiday either, she has a good plan for spending time with family and focusing on good food.   wk of 6/26  Dim 5: cravings, warning signs, triggers: none.  Wed reading was focused blame.  It said blaming is hiding, that when we blame others we try to hide our character defects.  We discussed the meaning of character defects. \"I was in big denial for a long time, it took being told I would need a new liver for me to really realize what drinking was doing. \"it hit me like a ton of bricks when I finally really heard it\"  wk of 6/19  She participated in  Psychoeducation/Skills: Self-Care.  Client s completed self-care assessment and described self-care as feeling better physically. Client identified physical self-care they do well- attending medical appointments, one they do poorly or needs improvement- eating healthy food and barriers that prevent themselves from self-care - cost of food is expensive, the shopping and prep.  This topic will give a general overview of self-care: physical, environmental, emotional, social and spiritual. It will explore importance of self-care and the impact on recovery.   Wk of 6/12. Dim 5: warning signs, triggers and cravings: none   helpful coping: attending group. Examples of benefits of drinking or drug use, or other behaviors Physical, Social " "mental or emotional: confidence, escape from reality, \"friends\"     Main drawbacks connected to these: not real friends that only want to drink together.  Headaches and slurred speech.  Remoarse  Benefits connected to abstinence from addictive behaviors:more able to do me and be me, I enjoy trud friends, I remember things better and will have better memories      Drawbacks you see connected with abstinence from these:  Wk of she participated in the daily reading and said she liked it.  Virginia also gave meaningful feedback to client who discussed use episode: \"it could be anyone of us\"  She said this is important and she is learning from others sharing.  Week of 23: Client denies riggers or warning signs this past week. She said she liked the saying \"it is just as easy to create good habits as bad ones\"   Patient reports family members and her  are concerned about their substance use.  Patient reports their recovery goals are \"abstinence forever.\"  She has had no previous detoxes or treatments and needs to gain insight into healthy coping for relapse prevention.      Dimension 6: Recovery Environment -   Previous Dimension Ratin  Current Dimension Ratin  Family Involvement - not at this time  Summarize attendance at family groups and family sessions - NA  Family supportive of treatment?  Yes    Community support group attendance - no  Recreational activities - some camping, cooking and boating    Narrative - wk of 10/9  5, but I am opening to this more and more\"  She participated in discussion on how to create healthy boundaries and was able to name physical boundaries like our body, personal space, sexual orientation, and privacy.   wk of 10/2  . [place]  participated in discussion of how addiction affects relationships and said: things that are hard are  even more difficult\"   wk of : sober support motivation: 4 on likert scale 1-low and 10 -high. She denies attending any sober support " "meetings. Client participated in an interactive educational session and learned about and discussed \"Community Based, Sober Support. this week. Client learned about 14 community based sober support meeting options and shared that she is interested in attending community based sober support meetings, that she only wants to attend the in person meetings not Zoom meetings. She shared after the discussions on the different types of sober support meetings that the one she \"liked the Rajendra and the 12 Steps\" meeting.     wk of 9/18  sober support motivation:  5, but I did make a call to a friend who has been sober 13 years and we are going to meet in a few weeks .   we discussed focused on self care over the next days and She intends to rest, babysit her grandkids with her  overnight, see her sister whom she has not seen for 4 months.     wk of 9/11 sober support motivation:  5, but I will look into SMART Recovery. She participated in Psychoeducation/Skills Goal Setting (RENEE)  This topic will give a general overview of short, intermediate and long term goals including the value of goal setting. She completed her goal setting worksheet and discussed things that can get in the way of her goal: others that suck energy out of her, old thinking about how nice a drink would be  Plan of structure for weekend: She said she will go camping with family this weekend in their new RV, she also has to rest a lot.  \"I love football and will watch most any game\"  absent wk o f9/4  wk of 8/28:\" I have a great deal going on with my upcoming surgery, will likely look into sober support when things settle down.  Right now my group is support\"  wk of 8/21 Client worked on her dimension 6 goals this week as she learned and discussed more about sober support. She continues to report a low desire to attend outside community based sober support meetings, however she does appear to be open to learning more about it.     wk of 8/14 [place] " "discussed that she went to [place] with friends who were drinking, but that they all said they would not need to drink. She said she appreciated that but it really didn't bother her in the least, as she likes being sober. Group discussed this type of situation and some group members said it would have been hard for them. [place] said if it ever is, she will change what she does or ask them not to use. \"I have very dear friends and I know they mean it when they say the don't need to drink around me.  sober support motivation: 5   8/7 \"I am at 5 on wanting to attend community support, but find group support very helpful!  wk of 7/31  sober support motivation:  I have so much going on with medical and this meeting 3x per week, that realistically, I will not likely look into community support until after my surgery at the end of the month.   WK of 7/24-client's support are her family, she is not attending support groups and motivation to attend is 2 on Likert scale. Client shared she has learned this week is this is a continuous journey and staying positive is huge. To support her sobriety this week she will continue to surround herself with good people. She is grateful for home, shelter and stability.  wk of 7/17:  sober support motivation: this group and my family.     wk of 7/10: \"I am motivated for outside support at 2/10, but when I get done with Tuesday group, I think my motivtiaon will be higher\"  wk of 7/5   We discussed \"The power of Solitude\" and how spending time alone with ourselves may not be easy or even desirable, it it is key to getting to know self.  I emphasized that studies tell us we need each other to survive and be happy, but also when we loose the ability to be alone with ourselves, sometimes our overstimulated nervous systems suffer from no place to rest and recharge.  She said this is good to discuss, as she may even consider doing it a bit more, as it \"really does recharge me\" She said she is pretty " "good about finding alone time and also uses journaling.  I asked client how this applies to recovery and treatment:She said she will often take a topic from group, or a question and journal about.  She said it really helps   Wk of 6/26. She said sober support motivation is  2, but I think motivation will grow as I keep settling in  I talked with clients about taking responsibility for our actions, so we can be empowered and own our life and also change if we need to. We discussed resources including AA, SMART Recovery.  I encouraged clients to start exploring meetings.  Wk of 6/19.  Virginia reports with having group 3x per week, it is a good deal of support, \" I will continue to learn about peer support\"  wk of 6/12client discussed resources sober support, including AA, Liver Transplant support and [place] recovery connection  wk of 6/6  goals for the wknd: look into volunteering at [facility] for writing card.  \"no community support attendance at this time\"   Client is not currently attending community sup[ort and does not have a sponsor. Client's supportive people are her family. Client shared she has friends and has not talked with many about her current situation, because \"I am a private person,\" (treatment and needing transplant).  Patient reports they are involved in community of holden activities    They reports spirituality impacts recovery in the following ways:  \"There was some changes as far as .  We do belong to a [employer] but it's been a number of years since we've been.\"  Patient believes her spirituality and sobriety are connected. Patient reported having 3 children, all adults.  Patient has 2 grandchildren.    She lives with her spouse and son. recreational/leisure activities: camping, cooking, boating, playing board games and card games.  Patient is currently disabled due to her Crohn's Disease.  Patient reports their income is obtained through SSDI disability; spouse.  Patient does not identify finances as a " "current stressor.         Progress made on transition planning goals: just began tx    Justification for Continued Treatment at this Level of Care:  No previous tx, needs 6 months of sobriety to be eligible for a transplant. Recent surgery and she may not need transplant, but reports she wants sobriety for her life.  Needs to learn long term relapse prevention, she reports finding that attending group is helpful for her and she is learning.She said she did not think she would get much out of this but is getting so much support and understanding of healthy coping.  She is not attending community support, at this time, \"but I find my group so helpful.  She is working on goals and assignments.  She gets regular PEth  tests, and there have been no indications of alcohol use.    Treatment coordination activities this week: wk of 9/25 staffed with SOLANGE Ponce. wk 9/18 Celia,  on LT asked her time frame to be done, since Virginia just had surgery and there is no issue on the horizon about getting a new liver   wk 8/28 Reviewed notes from sub Galen Rowland  7/31 reviewed Onelia Singh notes and discussed client with her.  7/18  Discussed client info with sub Onelia Singh   Discussed client info with margaret Singh  Need for peer recovery support referral? No    Discharge Planning: Not at this time      Has vulnerable adult status change? No    Interdisciplinary Clinical Supervision including:  no    Are Treatment Plan goals/objectives effective? Yes  *If no, list changes to treatment plan:    Are the current goals meeting client's needs? Yes  *If no, list the changes to treatment plan.  Plan:  Continue per Master Treatment Plan    *Client agrees with any changes to the treatment plan: Yes  *Client received copy of changes: Yes  *Client is aware of right to access a treatment plan review: Yes     Staff Members Contributing To Weekly Review:      SOLANGE Hamilton 9/27/2023  SOLANGE Jamil 9/25/2023          "

## 2023-10-11 ENCOUNTER — HOSPITAL ENCOUNTER (OUTPATIENT)
Dept: BEHAVIORAL HEALTH | Facility: CLINIC | Age: 58
Discharge: HOME | End: 2023-10-11
Attending: FAMILY MEDICINE
Payer: COMMERCIAL

## 2023-10-11 PROCEDURE — H2035 A/D TX PROGRAM, PER HOUR: HCPCS | Mod: HQ

## 2023-10-11 NOTE — GROUP NOTE
"Group Therapy Documentation    PATIENT'S NAME: Abiola Matute  MRN:   [id number]  :   1965  ACCT. NUMBER: [id number]  DATE OF SERVICE: 10/11/23  START TIME: 12:00 PM  END TIME:  2:00 PM  FACILITATOR(S): Judi Dodd LADC  TOPIC: BEH Group Therapy  Number of patients attending the group:  6  Group Length:  2 Hours    Group Therapy Type: Addiction and Psychoeducation    Summary of Group / Topics Discussed:    Boundaries, Emotional Regulation, Forgiveness, Grief & Loss, Mindfulness, and Self-Care Activities      Attestation:   Dr. Rj MD - Provides oversight and supervision of care.     Today in group we did a reading, \"I Am Your Disease\" and clients each named one thing they could relate to.  One client presented his assignment as she will be going out of country on a trip and presented her \"plan for structure\", group gave feedback.    Clients received handout on boundaries and we discussed psychoeducation below on creating healthy boundaries, physical boundaries, emotional boundaries, intellectual boundaries and barriers.  I then broke clients into groups and had them discuss \"Setting and Maintaining Boundaries\" assignment.  They turned these assignments in  and we discussed any questions that arose.  Clients participated in meditation that tied into boundaries and relationships.    Group Topic: How to create healthy boundaries     This topic will focus on gaining insight into boundaries.  It will discuss the definition of boundaries, the differences in physicla, emotional and intellectual boundaries, and the difference between healthy boundaries and unhealthy    Objective(s):     Client will gain insight into the definition of boundaries.  Clients will gain awareness of physical boundaries, emotional boundaries and intellectual boundaries.  Clients will assess a variety of situations so as to gain insight into creating healthy boundaries if/when they don't have      Structure (modalities, " "homework, worksheets, etc)      Provide psychoeducation on the definition of boundaries and 3 types of boundaries   Use teach-back techniques to ensure client understanding.   Client will be provided handouts to enhance learning.   Clients have been given an assignment previously to work on and will be given time to add to it.     Expected therapeutic outcome(s):     Client will more effectively set boundaries and understand their importance in relation to addiction  Client will assess their relationsips and boundaries and discuss what changes they want to make      Therapeutic outcome(s) measured by:      Client s ability to teach back information, actively participate in group discussions, and answering questions relating to group materials.     Group Attendance:  Attended group session  Patient's response to the group topic/interactions:  cooperative with task, discussed personal experience with topic, and expressed understanding of topic  Patient appeared to be Actively participating and Attentive.        Clients specifice information :  Virginia said \"I am your disease\" made her tear up and it is awakening\"  she said she is so much more accepting of her disease and not drinking, but if she wasn't this would sure be a motivator\" Today's session focused on dim, 6 boundaries and communication, and dim 3 meditation and mindufulness    She participated in discussion on how to create healthy boundaries and was able to name physical boundaries like our body, personal space, sexual orientation, and privacy.  I gave example of boundary violation like a close talker and how one might move back if they are too close. I suggested if that does not work a second action might be to ask the person to stop crowding.    Client was able to name emotional and intellectual boundaries and we discussed an example of not knowing how to separate one's feeling sform their partners and allowing that to dictated one's mood.  She also said she " "understood not taking responsibility for self and blaming others for your own problems.  I told clients that is easy to do but it makes us powerless and often leads to no resolve.  Client was in small group with 2 others and completed assignment.  Seemed to be participating meaningfully     Client participated in meditation:  \"calming\"    P)  Finish handout on boundaries and bring in any questions.     Judi Dodd, MS, LADC, LPC    Attestation:  Dr. Rj CHEN - Provides oversight and supervision of care.     "

## 2023-10-16 ENCOUNTER — HOSPITAL ENCOUNTER (OUTPATIENT)
Dept: BEHAVIORAL HEALTH | Facility: CLINIC | Age: 58
Discharge: HOME | End: 2023-10-16
Attending: FAMILY MEDICINE
Payer: COMMERCIAL

## 2023-10-16 PROCEDURE — H2035 A/D TX PROGRAM, PER HOUR: HCPCS | Mod: HQ

## 2023-10-16 NOTE — GROUP NOTE
"Group Therapy Documentation    PATIENT'S NAME: Abiola Matute  MRN:   [id number]  :   1965  ACCT. NUMBER: [id number]  DATE OF SERVICE: 10/16/23  START TIME: 12:00 PM  END TIME:  2:00 PM  FACILITATOR(S): Judi Dodd LADC  TOPIC: BEH Group Therapy  Number of patients attending the group:  2  Group Length:  2 Hours    Group Therapy Type: Addiction    Summary of Group / Topics Discussed:    Boredom, Boundaries, Communication, Emotional Regulation, Mindfulness, Resentments, and Self-Esteem/Self [place]      Today's group focused on a reading regarding compassion and empathy for self and how this might better serve a person.  Group said it is easier said than done and we discussed that change can be difficult.  I also reminded group how difficult it is to do the same things over and over that do not work. We discussed how this can affect use and warning signs and triggers.  Group also discussed any questions or scenarios from boundary handout or personal situations that occurred over the weekend.  We did role plays and discussed possible choices for going forward.  Clients checked in on all dimensions. One client presented his \"what my relapse would look like\" and group gave feedback.   This counselor gave clients a handout on definitions of radical acceptance, flight, flight and freeze, trance, compassion and empathy, so as to build on this for Wed group. We discussed definitions and examples.    Attestation: Dr. De La Rosa - Provides oversight and supervision of care.       Group Attendance:  Attended group session    Patient's response to the group topic/interactions:  cooperative with task, discussed personal experience with topic, and expressed readiness to alter behaviors    Patient appeared to be Actively participating and Attentive.        Client specific details:  [place]ia discussed feeling ready to begin Ph 3 pretty soon. We will meet next Monday to discuss plan.  She also said she has learned about " "patience for self and others.  She said some things take time.  \"I am better able to slow down, hear others, listen to myself\"  Today's session focused on wkly check in of all dimensions and also dim 3     Dim 1: ASHISH: no change reported   Dim 2: medical issues or appts: None  Dim 3: \"no thoughts of self harm or concerns\"  on likert scale 1-low and 10 -high:   Anxiety: 1  stress: 1  depression 0      issues to discuss and helpful coping: this group, assignments  Dim 4 on likert scale 1-low and 10 -high:  10 on  motivation for sobriety  Dim 5: cravings, warning signs, triggers: She said she does have some cravings and triggers, but feels confidnet in her tools and support.  \"I above all remember to take a step back, take a breath and think about risk vs rewards of using\"  Dim 6 sober support motivation:  5,    She participated in discussion on radical acceptance and also gave and received feedback well from group today. \"I appreciate feedback\"    P)  Continue to discuss cravings, triggers, warning sign with sober support and group. Write thoughts on Radical acceptance, from todays intro    Attestation:  Dr. De La Rosa - Provides oversight and supervision of care.  Judi Dodd, MS, LADC, LPC                                                       "

## 2023-10-17 NOTE — PROGRESS NOTES
"[facility] Weekly Treatment Plan Review      Attestation:   Dr. De La Rosa - Medical Director - Provides oversight and supervision of care.        Date Span: Monday: 10/16/23 through [facility] 10/22/23     Date     Group Therapy 2 hours   2 hours   0 hours 0 hours       Individual Therapy                Family Therapy                 Psychoeducation                 Other (Specify)                   Client had no absences this week     Wk o f  Virginia is excused this week due to holiday and to recovering from her surgery last week.  All notes below are based on last attendance and will be updated when client returns next week    Clients individual sessions noted below. Client had 1 excused absence this week, due to having surgery.    Client has no absences this week    Client has one excused absence this week , due to medical appts.  Client excused from group , due to medical appointment.     Program Running Totals: (No Phase 1 IOP due to this program being only OP level of care)  Total # of Phase 2 OP Group Sessions: 39 for 78  Virginia gets 60 sessions for 120 hours at this time and has 47    Total # of Phase 3 OP Recovery Management Group Sessions: None    Total # of 1:1 Sessions:  1 hour SI   1 hour 5/30  7/3,                                     Darinel , , ( & -Onelia)                    Weekly Treatment Plan Review     Treatment Plan initiated on: .    Dimension1: Acute Intoxication/Withdrawal Potential -   Previous Dimension Ratin  Current Dimension Ratin  Date of Last Use 22  Any reports of withdrawal symptoms - No  wk of : Client reports no change in sobriety date and exhibits no sx of intoxication or withdrawal.      Dimension 2: Biomedical Conditions & Complications -   Previous Dimension Ratin  Current Dimension Ratin    Medical Concerns:wk of 10/16, \"doing very well\"   wk of 10/9 \"still " "healing from surgery, but doing very well now\"  wk of 10/2 \"still healing from surgery, but good\"  wk of 9/25: Client denies any new or worsening medical issues.  wk of 9/18  Virginia discussed that she was not aware before this surgery that chances are high she will still need a transplant someday.  She said she went through a range of emotions, as she was not prepared for this. \"I had to go through what I did, and now feel better about keeping my life on the positive and 'I will deal with that bridge when I get to it\".  Group expressed empathy for all she has been through and also her positive attitude of gratitude.    I told her Celia,  on LT asked her time frame to be done, since Virginia just had surgery and there is no issue on the horizon about getting a new liver, I wanted to make sure I was understanding so as to know if Virginia and LT are good with normal schedule, which would have her completing about the end of November. Virginia met with Dr. Saavedra today and he said as far as anyone could ever see there is no thought or idea she will have a transplant.  Sometimes a person needs a new liver years from now, but things are looking good at this time.  Virginia said she has nothing special she wants to work on \"I keep learning from all that we do and I am sure building bonds with the others\"     wk of 9/11 \"no concerns, continuing to recovery from surgery\"  Surgery 8/29 for her cancer  /821/2023 Reports having no new or worsening health conditions. Reports having an appointment this week on 8/23/2023 with her Cardiologist.     wk of 8/14 No concerns I did  my pre op eval for my upcoming surgery with Dr. Bear and nurse Sakina Glass  wk of 8/7 No new concerns  wk of 7/31:  no, just good news that they may be able to remove all my tumore and I may not need a liver.  WK of 7/24-Client has an appointment with surgeon regarding increase in tumor. She is excused from programming due time conflict.  wk of 7/17 She had " "appt with radiology on 7/14, continues to work with her team due to LT, cancer and rash.  Virginia told group she is dealing with some news, medically, but working with her Drs.  She said she isn't wanting to share a whole lot, until she gethers her information, but she did want group to know she has support and appreciates just being able to share what she did  wk 7/10Jasmyn reports rash and working with , also had CT and MRI related to ongoing care for cancer and need for liver.  wk of 7/3  I get an infusion 1x per month and usually by week 3, I feel more sore and more tired, but I am grateful for the infusions.  \"no concerns beyond ongoing liver cancer and Crohns\"  wk of 6/12 She saw Jazmin Barrios at Urgent care for skin issues.  She also said she met with her GI practioner  She reports having liver cancer and Crohn's disease.  \"right now I am doing pretty well  Outside medical appointments this week (list provider and reason for visit)on 9/15 medical appt with Jeannette Cervantes gastroenterologist   8/29 Surgery with care team for cancer  wk of 8/14 No concerns I have my pre op eval with Dr. Glass, for my upcoming surgery   Wk of 8/7 No appts this week   wk of 7/31: no appts.  wk of 7/24 she saw surgeon Junior Bonilla, she is dealing with her liver tumor  wk of 7/17 She had appt with radiology, Dr. Shah,on 7/14, continues to work with her team due to LT, cancer and rash  : wk of 7/6 she saw Susanna RAZA for rash on leg.  Had a PeTH test 7/7, see below. No issues  : 6/13 she saw Jazmin Meng PA-C for a flare up of her psoriasis likely related to her other medical issues, per client     Current Medications & Medication Changes:  Current Outpatient Medications   Medication    ammonium lactate (AMLACTIN) 12 % external cream    clobetasol (TEMOVATE) 0.05 % external cream    furosemide (LASIX) 40 MG tablet    inFLIXimab (REMICADE) 100 MG injection    Multiple Vitamins-Minerals (MULTIVITAMIN & MINERAL PO)    " senna-docusate (SENOKOT-S/PERICOLACE) 8.6-50 MG tablet    spironolactone (ALDACTONE) 100 MG tablet    triamcinolone (KENALOG) 0.1 % external cream    UNABLE TO FIND     No current facility-administered medications for this encounter.     Facility-Administered Medications Ordered in Other Encounters   Medication    BREEZA Lemon-Lime flavored oral liquid for Neutral Abdominal/Pelvic Imaging 1,000 mL    hyoscyamine (LEVSIN/SL) sublingual tablet 125 mcg     Medication Prescriber:  Linda Macdonald  Taking meds as prescribed? Yes  Medication side effects or concerns:  no        Dimension 3: Emotional/Behavioral Conditions & Complications -   Previous Dimension Ratin  Current Dimension Ratin  PHQ2:       10/4/2023     4:00 PM 2023     2:00 PM 9/15/2023     9:12 AM 2023     5:00 PM 2023     8:00 AM 2023     1:34 PM 2023    10:00 AM   PHQ-2 (  [employer])   Q1: Little interest or pleasure in doing things 0 0 0 0 0    0 0 0   Q2: Feeling down, depressed or hopeless 0 0 0 0 0    0 0 0   PHQ-2 Score 0 0 0 0 0    0 0 0   Q1: Little interest or pleasure in doing things     Not at all     Q2: Feeling down, depressed or hopeless     Not at all     PHQ-2 Score     0        GAD2:       2023     9:54 AM 2023    10:00 AM 2023     8:00 AM 2023     5:00 PM 2023     2:00 PM 10/4/2023     4:00 PM   TAINA-2   Feeling nervous, anxious, or on edge 1 1 0    0    0    0    0 1 0 1   Not being able to stop or control worrying 1 0 0    0    0    0    0 0 0 1   TAINA-2 Total Score 2 1 0    0    0    0    0 1 0 2     PROMIS 10-Global Health (all questions and answers displayed):       2023     9:58 AM 2023     7:32 PM 2023    10:00 AM 2023     8:02 AM 2023     5:00 PM 2023     2:00 PM 10/4/2023     4:00 PM   PROMIS 10   In general, would you say your health is: Good Good  Good      In general, would you say your quality of life is: Good Good  Very good      In  general, how would you rate your physical health? Fair Fair  Fair      In general, how would you rate your mental health, including your mood and your ability to think? Good Good  Very good      In general, how would you rate your satisfaction with your social activities and relationships? Good Good  Excellent      In general, please rate how well you carry out your usual social activities and roles Good Good  Very good      To what extent are you able to carry out your everyday physical activities such as walking, climbing stairs, carrying groceries, or moving a chair? Completely Completely  Mostly      In the past 7 days, how often have you been bothered by emotional problems such as feeling anxious, depressed, or irritable? Sometimes Rarely  Rarely      In the past 7 days, how would you rate your fatigue on average? Mild Mild  Mild      In the past 7 days, how would you rate your pain on average, where 0 means no pain, and 10 means worst imaginable pain? 3 3  3      In general, would you say your health is: 3 3 3 3    3    3    3    3 3 4 4   In general, would you say your quality of life is: 3 3 4 4    4    4    4    4 3 4 4   In general, how would you rate your physical health? 2 2 3 2    2    2    2    2 3 3 3   In general, how would you rate your mental health, including your mood and your ability to think? 3 3 4 4    4    4    4    4 4 5 4   In general, how would you rate your satisfaction with your social activities and relationships? 3 3 5 5    5    5    5    5 4 5 4   In general, please rate how well you carry out your usual social activities and roles. (This includes activities at home, at work and in your community, and responsibilities as a parent, child, spouse, employee, friend, etc.) 3 3 5 4    4    4    4    4 4 4 4   To what extent are you able to carry out your everyday physical activities such as walking, climbing stairs, carrying groceries, or moving a chair? 5 5 4 4    4    4    4    4 3 4 3  "  In the past 7 days, how often have you been bothered by emotional problems such as feeling anxious, depressed, or irritable? 3 2 2 2    2    2    2    2 2 2 1   In the past 7 days, how would you rate your fatigue on average? 2 2 2 2    2    2    2    2 2 2 1   In the past 7 days, how would you rate your pain on average, where 0 means no pain, and 10 means worst imaginable pain? 3 3 4 3    3    3    3    3 2 3 2   Global Mental Health Score 12 13 17 17    17    17    17    17 15 18 17   Global Physical Health Score 15 15 14 14    14    14    14    14 14 15 15   PROMIS TOTAL - SUBSCORES 27 28 31 31    31    31    31    31 29 33 32     Mental health diagnosis none  Date of last SIB:  never  Date of  last SI:  never  Date of last HI: never  Behavioral Targets:  see tx plan  Risk factors:  Client dealing with liver disease/cancer and Crohns, client needs a new liver,   Protective factors:  spirituality, forward/future oriented thinking, safe and stable environment, regular physical activity, living with other people and structured day  Current MH Assignments:  see tx plan      Narrative: wk of 10/16 She participated in discussion on radical acceptance and also gave and received feedback well from group today. \"I appreciate feedback\"   3: \"no thoughts of self harm or concerns\"  on likert scale 1-low and 10 -high:   Anxiety: 1  stress: 1  depression 0      issues to discuss and helpful coping: this group, assignments, talking with my . She participated in psychoeducation on  Distress Tolerance-Radical Acceptance   This topic will focus on skills designed to keep our pain from turning into unnecessary suffering.  Virginia received handout and participated in discussion on various strategies to effectively manage distressing situations and said this was a helpful discussion.  Client was able to teach back what radical acceptance means and said \"this is good stuff\"    wk of 10/9   \"no thoughts of self harm or " "concerns\"  on likert scale 1-low and 10 -high:   Anxiety: 0  stress: 1  depression 0      issues to discuss and helpful coping: meeting and talking with others  wk of 10/2 She completed PHQ-2, TAINA-2, and PROMISE and she expressed no need for further discussion at this time.  Client took part in a discussion of trauma as associated with addiction and was introduced to the idea of radical acceptance. She participated in a \"[place]\" guided visualization which she described as helpful.   wk of 9/25: no thoughts of self harm. On likert scale 1-low and 10 -high:   Anxiety: 0; depression: 0; stress: 0   wk of 9/18 Today I had Virginia answer 3 questions before doing the daily reading focused on acceptance, as it related to self and to what life presents. We had a long discussion on This.   \"no thoughts of self harm or concerns\"  on likert scale 1-low and 10 -high:   Anxiety: 1  stress: 1  depression 1  issues to discuss and helpful coping: talking with my care team, using breath work. Talking to my .   She said she has not felt like eating much, but the positive of it is she has lost 10 pounds.  Virginia  completed PROMIS, scoring 33. TAINA-2, scoring 0, and PHQ-2, scoring 0.  She said she sometimes has worries in her life, but overall feels blessed to not have a lot of anxiety or depression.   wk of 9/11  : \"no thoughts of self harm or concerns\"  on likert scale 1-low and 10 -high:   Anxiety:  2  stress: 1 depression 0  issues to discuss and helpful coping:  right now resting  wk of 8/28, RR changed to 0 from 1.  Virginia continues to report healthy coping for anxiety symptoms.  She remains optimistic and finds using tools learned are helpful, as is discussion with supportive people.  : \"no thoughts of self harm or concerns\"  on likert scale 1-low and 10 -high:   Anxiety:  7  stress: 1 depression 2     Virginia will be having her surgery tomorrow.  She discussed \"anxiety mireya high, yet I am hopeful\"  She said she has done all " "she can with her health and not drinking and preparing herself mentally.  She said she was glad she had group to attend and gets \"immense support\"  wk of 8/21/2023 She reports her anxiety is down and she experiencing no stress or depression. Client worked on her Dimension Three goals this week as she learned about and discussed the topics of Shame, Stigma and Mindfulness.     wkof 8/14  Ways I can think differently: listen to my critical voice and change my thoughts  Ways I can do things differently:  talk to myself like I would a friend.  \"no thoughts of self harm or concerns\"  on likert scale 1-low and 10 -high:   Anxiety:  0  stress: 0 depression 0  issues to discuss and helpful coping:  doing fun things, attending this group, being sober  wk of 8/7 ratings on likert scale 1-low and 10 -high: Anxiety: 2 stress: 1 depression 2. She participated in discussion on \"change the channel\" when getting into an anxiety loop. \"It is a simple way to do something more productive. [place] actively participated in discussion on Thought distortions and discussed which ones are the ones she actively wants to focus on for the time being: catastrophising and prediction.  She discussed  two situations where the techniques could be helpful, including: Should's and musts and not setting up unrealistic expectations of how she or life should be.  I want to remind myself \"until someone has walked a mile in my shoes, they don't get to  me and the same goes for me with them.  Virginia actively participated in meditation and said it was good to take that time to be.  \"I liked it\"  wk of 7/31  she participated in psychoeducation on Understanding my anxiety   This topic helped client to gain awareness of thoughts and feelings associated with anxiety and act with awareness when feeling anxiety and use helpful coping.  Client did actively participate in group discussions, and was able  to identify anxious thoughts, physical feelings when having " "anxiety: racing heart, shaking, confusion  \"why me\"  She was able to  identify 2 helpful coping methods: breath, talk with her  who is a comfort, stop the story telling/negativity when aware.  Virginia discussed that her tumor is shrinking and she is \"cautiously optimistic\". She said she wanted to share that she has many feelings associated with this, \"it even could mean I won't need a LT, but that remains to be seen\"  I told Virginia she has been shown how with our spirituality and belief, sometimes things can happen that were never anticipated.  She said \"so true\" I never had an idea that was even a part of my hope\"      Wk of 7/24- client denies thoughts of self-harm. On Likert scall 1-low and 10-high client rates anxiety 1  depression 0 and stress 2. She shared her new diagnosis of tumor growth has impacts stress and anxiety.   wk of 7/17: no thoughts of self harm or concerns\" on likert scale 1-low and 10 -high:   Anxiety:  5  stress: 5 depression 1  issues to discuss and Helpful coping:  All related to my medical, but I have great medical team and family support.  Client said of the reading:I like the ongoing discussions of shame and this reading brought me awareness that I have some to deal with regarding the nature of why many get liver cancer.       wk of 7/10:  She was able to repeat the steps of 5 senses, as a review.   \"no thoughts of self harm or concerns\".  Clients feedback to group member  on likert scale 1-low and 10 -high:   Anxiety:  2  stress: 2  depression 2  issues to discuss and Helpful coping: talking with you guys.  I had a craving that was pretty big this week.  Psychoeducation/Skills The thinking-feeling connection. This topic will address information from the [employer] about how our thoughts influence our feelings.  Client was able to define automatic thoughts and make a connection with some that she has.   She said that she has been continueing to gain awareness of " "her beliefs vs perceptions vs what is a thought and what is a feeling.  Discussed that reminding herself it is ok to have thoughts and feelings, but they are not facts for other people.    Client participated in lovingkindness meditation and said it was just what she needed today and it that it was helpful and grounding.  \"Thank you for doing it\"    wk of 7/3: Clients participated in 5 senses psychoeducation discussion, participated in guided practice and was able to name 5 parts, and said she feels it will be a helpful tool  \"no thoughts of self harm or concerns\".   I discussed the research on gratitude journals and expressed she has seemed like she has an \"attitude of gratitude\".  She said she feels blessed and hopes she does.  on likert scale 1-low and 10 -high:   Anxiety:  0  stress: 0 depression 0  issues to discuss and Helpful coping: I am learning to say no, and that boundaries are good    wk of 6/26  Attended  skills group with Darinel Au.    She participated in psychoeducation on Core beliefsThis topic will discuss core beliefs, as a lens through which we see life and how that affects our choices.  Virginia said the discussion on core beliefs is something she will spend some time on, as the awareness is helpful.  I told her we will follow up on Wednesday with more that can be helpful when we notice harmful core beliefs.   \"no thoughts of self harm or concerns\"  on likert scale 1-low and 10 -high:   Anxiety:  1 stress: 1 depression 0  issues to discuss and Helpful coping: learning about self, anxiety and stress  wk of 6/19 \"no thoughts of self harm or no concerns\".  She reports on likert scale 1-low and 10 -high:   Anxiety:  1  stress: 1 depression 0  issues to discuss and Helpful coping: getting to know people better in group, hearing other's helpful coping.  Attended  skills group with Darinel Au.  We discussed anticipating some situations and \"gearing up\" by practicing deep breathing. I " "reminded client of previous discussions where beliefs often guide how we feel about certain situations, but that we can reframe them, or look at them for the particular situation.      Wk of   I am feeling much better and less anxiety about group attendance as I continue to attend and get to know others. \"no thoughts of self harm or concerns\". On likert scale 1-low and 10 -high:   Anxiety:  1  stress: 1 depression 1  issues to discuss and Helpful coping:  Breathing, getting to others in group  Wk of  \"no thoughts of self harm\"  Attended MH skills group with Darinel Au  Week of 23 :Client reports feeling anxious due to first treatment experience, not knowing what to expect, her  Mother's declining health-needing a placement for higher level of care, spouse recently lost his job-need to obtain COBRA insurance and unable to care for grandchildren because of attending treatment. Client was saw Traci Aguilar and has not seen them in about 6 weeks. To follow up with primary counselor re: seeing psychotherapist or return to [facility].  Client rates the following on a scale of 1 (low) to 10 (high):  Anxiety- 5  Depression -1  Stress- 5 as a result of above concerns.   Client participated in psychotherapy/education on self-care, introduction to relaxation meditation-tensing and relaxing muscles, and sleep hygiene.  Current Mental Health symptoms include: . Active interventions to stabilize mental health symptoms this week :   At SI her PHQ-2 score was 0 and his TAINA-2 score was 1.  PROMISE 10 was  31:  She reports that at this time she is dealing with her mother's living situation and health issues. She reports that her father has passed away and that she is estranged from 1 sister.         Dimension 4: Treatment Acceptance / Resistance -   Previous Dimension Ratin  Current Dimension Ratin  RENEE Diagnosis:  Alcohol Use Disorder   303.90 (F10.20) Severe In a controlled " "environment  Commitment to tx process/Stage of change- contemplation  RENEE assignments - see tx plan      Narrative - wk of 10/16    10 on  motivation for sobriety   Virginia discussed feeling ready to begin Ph 3 pretty soon. We will meet next Monday to discuss plan.  She also said she has learned about patience for self and others.  She said some things take time.  \"I am better able to slow down, hear others, listen to myself\"   10/9 [place] said \"I am your disease\" made her tear up and it is awakening\"  she said she is so much more accepting of her disease and not drinking, but if sh e wasn't this would sure be a motivator\"  wk of 10/9  she participated in psychoeducation on Readiness to change (stages of change).  This topic gave a general overview of stage of change models and how they apply to the personal experience of each patient.  She participated in discussion, received handout on stages and also listed which stage she was in with: Tx: action   sobriety/abstinence:  action    sober support: determination      community support:  contemplation     my individual goal: action  \"I am your disease\" and she said \"it is so powerful\"       wk of 10/2 [place] expressed understanding of limits of discussion in group, including politics and Mosque. Counselor emphasized that  beliefs are ok, just sometimes how one express them might be unfair or sound judgemental. She said she is motivated for sobriety 10/10  wk of 9/25: motivation for sobriety =10 on likert scale 1-low and 10 -high. She reports accepting that she is an alcoholic.  wk of 9/18  on likert scale 1-low and 10 -high: 10 on motivation for sobriety. She   wk of 8/28  on likert scale 1-low and 10 -high: 10   motivation for sobriety 10.  Motivated to continue a life of sobriety, \"I am learning so much about self.  wk of 8/21/2023 Client expressed this week about her alcoholism that that she liikes that \"I'm getting better not as shameful.\" That for her it has helped to " "accept alcoholism, \"occurs as a disease.\"     wk of 8/14  [place]ia told group \"I know I am an alcoholic\" she said she is so glad to have had to find her way to the group, even if it was through health issues or she would still be doing damage.  motivation for sobriety; 10 she likes being sober and reports she likes learning and attending group.   wk of 8/7  motivation for sobriety 10.  Motivation is high to have quality of life  wk of 7/31  on likert scale 1-low and 10 -high:  motivation for sobriety: 10   WK of 7/24-motivation 10 on Likert scale.she expressed strong commitment to sobriety, see notes dated 7/25 for more details.  wk of 7/17:  on likert scale 1-low and 10 -high:  motivation for sobriety: 10+  wk of 7/10  My motivation for sobriety is 10/10\".  I think it is so important to attend and be dedicated to support in this group, and to \"say things outloud, so it takes the power away, as you say, Alana\"  wk of 7/3  on likert scale 1-low and 10 -high:  motivation for sobriety 10.  Virginia said Mondays session really stuck with her and she thought about a lot of things and is really understanding why attendance and building on the days and lessons is important.     wk of 6/26  sober support motivation:  2, but I think motivation will grow as I keep settling in and I am motivated 10/10 for sobreity.    Wk of 6/19:  Client reports motivation for sobriety is 10 (high) Motivation for attending community support 5 (on scale 1 low-10 high).   Virginia expressed to her group how helpful they are to her and how much she appreciates all that members shared about their stories.  She said she gains such helpful insight for self   Week of 6/12 [place] discussed that she has been late a couple times, but realized with 2 other members gone, how this speaks to the importance of being on time and valuing and respecting others time, too.   She will also miss tomorrows group due to a long awaited medical appt. Dim 4: on likert scale 1-low " "and 10 -high:  Motivation for sobriety:  10  motivation for sober support attendance: not doing yet  Wk of   Virginia also said she finds motivation for sobriety in her family and friends, \"but really deep down need to do this for myself\"  Told counselor and group how she didn't think she would get much out of tx and already is getting so much and is grateful for older adult group   Week of 23: Client reports motivation for sobriety is 10 (high) Motivation for attending community support 5 (on scale 1 low-10 high).  The patient is motivated, to explore treatment.  She said \"of course I am partly here to fulfill requirements for liver transplant, but I want to learn healthy coping for lifelong sobriety.      Dimension 5: Relapse / Continued Problem Potential -   Previous Dimension Ratin  Current Dimension Ratin  Relapses this week - None  Urges to use - None  UA results -   No results found for this or any previous visit (from the past 168 hour(s)).      Using ReSet Clem: N/A    Narrative- wk of 10/16 cravings, warning signs, triggers: She said she does have some cravings and triggers, but feels confidnet in her tools and support.  \"I above all remember to take a step back, take a breath and think about risk vs rewards of using\"   Wk of 10/9 cravings, warning signs, triggers: \"none this week\" .  She said  \"I have even more acceptance of living a life of sobriety\". Group congratulated her on her movement and acceptance and hard work   wk of 10/2: RR changed 20 1.  She reports having craving of 1 when having dinner with friends. \"I ordered a club soda, and indulged in conversation. She reports cravings at 1 of 10 triggered by watching football with drinking friends. She reports dealing with cravings by getting away from the group for a short time by taking a walk outdoors. Client was able to teach back information for urge surfing and said this is a good tool.  She was congratulated by writer and group " "peers for having obtained over 9 months of sobriety    wk of 9/25: cravings =1 on likert scale 1-low and 10 -high. Reported dinner with friends as a trigger but reported only minimal cravings. She reports engaging in conversation and ordering a club soda as helpful coping skills.  wk of 9/18  She participated in Psychoeducation/Skills Relapse Prevention (RENEE)  This topic gave a general overview of basic relapse prevention, including definitions of warning signs, triggers and cravings and the importance of addressing healthy coping skills for ongoing relapse prevention.Patient was able to  name 4 of their warning signs and triggers, including:  Too much time with others whom don't like to stop drinking, concerts/shows if I don't have a plan  Patient was able to name 4 healthy coping mechanisms for dealing with their warning signs and triggers: including: making a plan of structure  wk of 9/11 Sights, sounds and situations that are triggers : concerts and comedy shows, but support, discussion and a fun NA drink are how I deal with it.   Virginia said each day she is healing more from surgery but is still quite sore.  \"I have had absolutely no cravings or desire to use alcohol\".   Gone week of 9/4 for surgery   wk of 8/28  cravings, warning signs, triggers: none I went to a [employer] game and had no cravings.\"   Wk of 8/21/2023 She reports having no cravings to drink this week and having a \"wonderful week.\"       Wk of 8/14   lient discussed a big warning sign for self is thinking I can drink again, especially socially.  II think about  it regularly.  Group discussed that awareness of this is good and it is likely normal to do this.  I encouraged her to continue to discuss this and down the road to keep in mind with sponsor, therapist, etc. And work through it.  I told her it is really good she can say this outloud.   Something I am doing well: having plans of structure, especially when around situations I used to drink in   " "  any cravings, warning signs, triggers: the [place] concert could have been but wasn't .  I have supportive people and that helps, but so does the realization of my use patterns and how I feel better not using  wk of 8/7 Virginia said that a trigger for her was going to the casino for a comedy show. \"I haven't been there since starting tx\"  My  and I just kept walking and he also said \"I got you\".  She said her cravings only lasted a couple minutes.  We discussed how this can be common and some group members said they cannot go to the same places at all because of that kind of trigger.  She said \"I get it, and will continue to be aware of situations like this   wk of 7/31   cravings, warning signs, triggers: None this week   WK of 7/24-client denies triggers, cravings or warning signs this past week.  wk of 7/17:  cravings, warning signs, triggers:i went to a musical festival, had a few cravings, but also had insight when watching others use, \"glad it isn't me\"  Wk of 7/10 Had a PeTH test 7/7, see above. No issues. She discussed the Serenity Prayer and that she finds it helpful.  She also discussed having cravings at a 4 of 10 when cooking, and what she found helpful:  Processed, talked with , talk with group today about it.   Virginia said she really had quite a learning week with events in group, discussions and assignments.  \"My heart goes out to the new person the other day, but also I had some anger about all the disruptions and negativity from her\"  \"I am ready to move on, and learn what I learned, and know that could be any one of us\"  wk of 7/5  cravings, warning signs, triggers: none.  My adult kids are caring and asked if I thought I would be OK going to [employer] [place], as there is drinking there.  She told them she appreciates the concern, but feels with them and little to no triggers, for quite some time, she didn't feel it was a problem.  Virginia said she had a wonderful time with her family and is " "not concerned about use over 4th of July holiday either, she has a good plan for spending time with family and focusing on good food.   wk of 6/26  Dim 5: cravings, warning signs, triggers: none.  Wed reading was focused blame.  It said blaming is hiding, that when we blame others we try to hide our character defects.  We discussed the meaning of character defects. \"I was in big denial for a long time, it took being told I would need a new liver for me to really realize what drinking was doing. \"it hit me like a ton of bricks when I finally really heard it\"  wk of 6/19  She participated in  Psychoeducation/Skills: Self-Care.  Client s completed self-care assessment and described self-care as feeling better physically. Client identified physical self-care they do well- attending medical appointments, one they do poorly or needs improvement- eating healthy food and barriers that prevent themselves from self-care - cost of food is expensive, the shopping and prep.  This topic will give a general overview of self-care: physical, environmental, emotional, social and spiritual. It will explore importance of self-care and the impact on recovery.   Wk of 6/12. Dim 5: warning signs, triggers and cravings: none   helpful coping: attending group. Examples of benefits of drinking or drug use, or other behaviors Physical, Social mental or emotional: confidence, escape from reality, \"friends\"     Main drawbacks connected to these: not real friends that only want to drink together.  Headaches and slurred speech.  Remoarse  Benefits connected to abstinence from addictive behaviors:more able to do me and be me, I enjoy trud friends, I remember things better and will have better memories      Drawbacks you see connected with abstinence from these:  Wk of6/6 she participated in the daily reading and said she liked it.  Virginia also gave meaningful feedback to client who discussed use episode: \"it could be anyone of us\"  She said this is " "important and she is learning from others sharing.  Week of 23: Client denies riggers or warning signs this past week. She said she liked the saying \"it is just as easy to create good habits as bad ones\"   Patient reports family members and her  are concerned about their substance use.  Patient reports their recovery goals are \"abstinence forever.\"  She has had no previous detoxes or treatments and needs to gain insight into healthy coping for relapse prevention.      Dimension 6: Recovery Environment -   Previous Dimension Ratin  Current Dimension Ratin  Family Involvement - not at this time  Summarize attendance at family groups and family sessions - NA  Family supportive of treatment?  Yes    Community support group attendance - no  Recreational activities - some camping, cooking and boating    Narrative - wk of 10/16.  I find this group, my  and family, and many friends to be very supportive.  For structure and sober support for the weekend,  Virginia said she will be spending time with her family much of weekend, including a 2 year old bday party.  She said she is going to spend time journaling each day too.   wk of 10/9  5, but I am opening to this more and more\"  She participated in discussion on how to create healthy boundaries and was able to name physical boundaries like our body, personal space, sexual orientation, and privacy.   wk of 10/2  . [place]  participated in discussion of how addiction affects relationships and said: things that are hard are  even more difficult\"   wk of : sober support motivation: 4 on likert scale 1-low and 10 -high. She denies attending any sober support meetings. Client participated in an interactive educational session and learned about and discussed \"Community Based, Sober Support. this week. Client learned about 14 community based sober support meeting options and shared that she is interested in attending community based sober support meetings, " "that she only wants to attend the in person meetings not Zoom meetings. She shared after the discussions on the different types of sober support meetings that the one she \"liked the Rajendra and the 12 Steps\" meeting.     wk of 9/18  sober support motivation:  5, but I did make a call to a friend who has been sober 13 years and we are going to meet in a few weeks .   we discussed focused on self care over the next days and She intends to rest, babysit her grandkids with her  overnight, see her sister whom she has not seen for 4 months.     wk of 9/11 sober support motivation:  5, but I will look into SMART Recovery. She participated in Psychoeducation/Skills Goal Setting (RENEE)  This topic will give a general overview of short, intermediate and long term goals including the value of goal setting. She completed her goal setting worksheet and discussed things that can get in the way of her goal: others that suck energy out of her, old thinking about how nice a drink would be  Plan of structure for weekend: She said she will go camping with family this weekend in their new RV, she also has to rest a lot.  \"I love football and will watch most any game\"  absent wk o f9/4  wk of 8/28:\" I have a great deal going on with my upcoming surgery, will likely look into sober support when things settle down.  Right now my group is support\"  wk of 8/21 Client worked on her dimension 6 goals this week as she learned and discussed more about sober support. She continues to report a low desire to attend outside community based sober support meetings, however she does appear to be open to learning more about it.     wk of 8/14 [place] discussed that she went to [employer] with friends who were drinking, but that they all said they would not need to drink. She said she appreciated that but it really didn't bother her in the least, as she likes being sober. Group discussed this type of situation and some group members said it would have been " "hard for them. Virginia said if it ever is, she will change what she does or ask them not to use. \"I have very dear friends and I know they mean it when they say the don't need to drink around me.  sober support motivation: 5   8/7 \"I am at 5 on wanting to attend community support, but find group support very helpful!  wk of 7/31  sober support motivation:  I have so much going on with medical and this meeting 3x per week, that realistically, I will not likely look into community support until after my surgery at the end of the month.   WK of 7/24-client's support are her family, she is not attending support groups and motivation to attend is 2 on Likert scale. Client shared she has learned this week is this is a continuous journey and staying positive is huge. To support her sobriety this week she will continue to surround herself with good people. She is grateful for home, shelter and stability.  wk of 7/17:  sober support motivation: this group and my family.     wk of 7/10: \"I am motivated for outside support at 2/10, but when I get done with Tuesday group, I think my motivtiaon will be higher\"  wk of 7/5   We discussed \"The power of Solitude\" and how spending time alone with ourselves may not be easy or even desirable, it it is key to getting to know self.  I emphasized that studies tell us we need each other to survive and be happy, but also when we loose the ability to be alone with ourselves, sometimes our overstimulated nervous systems suffer from no place to rest and recharge.  She said this is good to discuss, as she may even consider doing it a bit more, as it \"really does recharge me\" She said she is pretty good about finding alone time and also uses journaling.  I asked client how this applies to recovery and treatment:She said she will often take a topic from group, or a question and journal about.  She said it really helps   Wk of 6/26. She said sober support motivation is  2, but I think motivation will " "grow as I keep settling in  I talked with clients about taking responsibility for our actions, so we can be empowered and own our life and also change if we need to. We discussed resources including AA, SMART Recovery.  I encouraged clients to start exploring meetings.  Wk of 6/19.  Virginia reports with having group 3x per week, it is a good deal of support, \" I will continue to learn about peer support\"  wk of 6/12client discussed resources sober support, including AA, Liver Transplant support and [place] recovery connection  wk of 6/6  goals for the wknd: look into volunteering at [facility] for writing card.  \"no community support attendance at this time\"   Client is not currently attending community sup[ort and does not have a sponsor. Client's supportive people are her family. Client shared she has friends and has not talked with many about her current situation, because \"I am a private person,\" (treatment and needing transplant).  Patient reports they are involved in community of holden activities    They reports spirituality impacts recovery in the following ways:  \"There was some changes as far as .  We do belong to a [employer] but it's been a number of years since we've been.\"  Patient believes her spirituality and sobriety are connected. Patient reported having 3 children, all adults.  Patient has 2 grandchildren.    She lives with her spouse and son. recreational/leisure activities: camping, cooking, boating, playing board games and card games.  Patient is currently disabled due to her Crohn's Disease.  Patient reports their income is obtained through SSDI disability; spouse.  Patient does not identify finances as a current stressor.         Progress made on transition planning goals: just began tx    Justification for Continued Treatment at this Level of Care:  No previous tx, needs 6 months of sobriety to be eligible for a transplant. Recent surgery and she may not need transplant, but reports she wants sobriety for " "her life.  Needs to learn long term relapse prevention, she reports finding that attending group is helpful for her and she is learning.She said she did not think she would get much out of this but is getting so much support and understanding of healthy coping.  She is not attending community support, at this time, \"but I find my group so helpful.  She is working on goals and assignments.  She gets regular PEth  tests, and there have been no indications of alcohol use.    Treatment coordination activities this week: wk of 9/25 staffed with SOLANGE Ponce. wk 9/18 Celia  on LT asked her time frame to be done, since Virginia just had surgery and there is no issue on the horizon about getting a new liver   wk 8/28 Reviewed notes from sub Galen Rowland  7/31 reviewed Onelia Singh notes and discussed client with her.  7/18  Discussed client info with sub Onelia Singh   Discussed client info with margaret Singh  Need for peer recovery support referral? No    Discharge Planning: Not at this time      Has vulnerable adult status change? No    Interdisciplinary Clinical Supervision including:  no    Are Treatment Plan goals/objectives effective? Yes  *If no, list changes to treatment plan:    Are the current goals meeting client's needs? Yes  *If no, list the changes to treatment plan.  Plan:  Continue per Master Treatment Plan    *Client agrees with any changes to the treatment plan: Yes  *Client received copy of changes: Yes  *Client is aware of right to access a treatment plan review: Yes     Staff Members Contributing To Weekly Review:      SOLANGE Hamilton 9/27/2023  SOLANGE Jamil 9/25/2023          "

## 2023-10-18 ENCOUNTER — HOSPITAL ENCOUNTER (OUTPATIENT)
Dept: BEHAVIORAL HEALTH | Facility: CLINIC | Age: 58
Discharge: HOME | End: 2023-10-18
Attending: FAMILY MEDICINE
Payer: COMMERCIAL

## 2023-10-18 ENCOUNTER — TELEPHONE (OUTPATIENT)
Dept: GASTROENTEROLOGY | Facility: CLINIC | Age: 58
End: 2023-10-18
Payer: MEDICARE

## 2023-10-18 PROCEDURE — H2035 A/D TX PROGRAM, PER HOUR: HCPCS | Mod: HQ

## 2023-10-18 NOTE — TELEPHONE ENCOUNTER
received a message from KP Regan Pt to help get Pt scheduled for a sooner appointment with Dr. Fuller on 11/7/2023. Ronaldr called and left message for the Pt, along with call back number.  offered Pt an appointment on 11/7/2023 at 3 PM with Dr. Jovita Fuller.

## 2023-10-18 NOTE — GROUP NOTE
"Group Therapy Documentation    PATIENT'S NAME: Abiola Matute  MRN:   [id number]  :   1965  ACCT. NUMBER: [id number]  DATE OF SERVICE: 10/18/23  START TIME: 12:00 PM  END TIME:  2:00 PM  FACILITATOR(S): Judi Dodd LADC  TOPIC: BEH Group Therapy  Number of patients attending the group:  5  Group Length:  2 Hours    Group Therapy Type: Addiction and Psychoeducation    Summary of Group / Topics Discussed:    Communication, Distress Tolerance, Leisure Exploration/Use of Leisure Time, Mindfulness, and Self-Care Activities      Attestation:   Dr. Rj MD - Provides oversight and supervision of care.     Today in group we did a reading.  Weather or not we are conscious of our choices, we are making them every moment. I reminded them that situations can present new ideas and understanding if we are open to that.   I suggested to clients that accepting full responsibility for our actions is part of maturity and probably necessary for further development.  I asked clients to discuss how all of this is connected to addiction and recovery.  One client presented his Goodbye letter and Recovery Care Plan, as he is discharging today.  Group watched you tube by Emily Orlando \"[place] to Love and [place]\" and particpated in pscychoeduction below on distress tolerance-radical acceptance.    Group Topic: Distress Tolerance-Radical Acceptance   This topic will focus on skills designed to keep our pain from turning into unnecessary suffering.       Objective(s):     Client will gain insight into coping skills to help manage distressing situations.    Client will identify the difference between what radical acceptance looks like vs resisting reality.    Client will identify at least one current example of a situation they struggle to accept and identify what a radical acceptance thought about that situation could be.       Structure (modalities, homework, worksheets, etc)    Provide psychoeducation on various " "strategies to effectively manage distressing situations   Provide example situations of what radical acceptance vs resisting reality, looks like.    Use teach-back techniques to ensure client understanding.   Client will be provided handouts to enhance learning.     Expected therapeutic outcome(s):   Be able to cope with situations in a more helpful and effective manner   Be able to accept the things we cannot change thus increasing our ability to be more mindful and present of today.       Therapeutic outcome(s) measured by:      Client s ability to teach back information, actively participate in group discussions, and answering questions relating to group materials.     Group Attendance:  Attended group session  Patient's response to the group topic/interactions:  cooperative with task, discussed personal experience with topic, and expressed understanding of topic  Patient appeared to be Actively participating and Engaged.        Clients specifice information :  Today's session focused on  dim 3 Distress tolerance, Radical acceptance and dim 6 sober structure for the next days.  Virginia received handout and participated in psychoeducation on various strategies to effectively manage distressing situations and said this was a helpful discussion.  Client was able to teach back what radical acceptance means and said \"this is good stuff\"  I did provide example situations of what radical acceptance vs resisting reality, looks like.  Client was able to name how her body and emotions might differ in these two situations.  She watched part of YouTube \"Emily Orlando: [place] to Love, [place] and Peace\" and participated in discussion. I told her Emily Orlando has many good Youtube videos on many topics   Virginia said she will be spending time with her family much of weekend, including a 2 year old bday party.  She said she is going to spend time journaling each day too.       P)  Journal each day, as part of your structure.  Use Radical " acceeptance.  Explore You Tube: Radical acceptance for further information.Discharge from tx.     Judi Dodd, MS, LADC, LPC    Attestation:  Dr. Rj CHEN - Provides oversight and supervision of care.

## 2023-10-19 ENCOUNTER — TELEPHONE (OUTPATIENT)
Dept: BEHAVIORAL HEALTH | Facility: CLINIC | Age: 58
End: 2023-10-19
Payer: MEDICARE

## 2023-10-19 NOTE — TELEPHONE ENCOUNTER
----- Message from SOLANGE Grimaldo sent at 10/16/2023  2:18 PM CDT -----  Regarding: add individual session  Scheduling Request  add individual session      Patient name: STEVEN MCNAMARA [[id number]]  Location of program:  [place]  Group: [id number] on Monday, Tuesday, and Wednesday, at 12:00 pm: PM to 2:00 pm  Individual Appt (SI)    Date:  10/23       Time: 11:15    Provider:     Number of visits:   1     Length of appt: 60 minutes  Visit type:  in person      Billing Type: part of program    Thank You,  Alana Dodd M.S., SOLANGE, LPC  Lead Counselor  [phone]

## 2023-10-23 ENCOUNTER — HOSPITAL ENCOUNTER (OUTPATIENT)
Dept: BEHAVIORAL HEALTH | Facility: CLINIC | Age: 58
Discharge: HOME | End: 2023-10-23
Attending: FAMILY MEDICINE
Payer: COMMERCIAL

## 2023-10-23 PROCEDURE — H2035 A/D TX PROGRAM, PER HOUR: HCPCS

## 2023-10-23 PROCEDURE — H2035 A/D TX PROGRAM, PER HOUR: HCPCS | Mod: HQ

## 2023-10-23 NOTE — GROUP NOTE
"Group Therapy Documentation    PATIENT'S NAME: Abiola Matute  MRN:   [id number]  :   1965  ACCT. NUMBER: [id number]  DATE OF SERVICE: 10/23/23  START TIME: 12:00 PM  END TIME:  2:00 PM  FACILITATOR(S): Judi Dodd LADC  TOPIC: BEH Group Therapy  Number of patients attending the group: 5  Group Length:  2 Hours    Group Therapy Type: Addiction and Skills/Education    Summary of Group / Topics Discussed:    Balanced Lifestyle , Boundaries, Communication, Grief & Loss, Relaxation Techniques, and Self-Care Activities          Today's group focused on a reading from \"Keep it Simple\" by Mariam.  This reading was about accepting that life can sometimes be hard, but like with Step 1, once we admit and accept, we are given the power to recover.   I reminded clients that we often spend a lot of time and energy trying to avoid difficulties, but we can use that same energy to solve problems, instead.   I asked clients with where they are today how would they answer the question.  We discussed powerlessness does not mean over life in general, it means over drinking.  I asked clients about powerlessness and manageability over substances, and their thoughts on where they are at today.   Clients checked in on all dimensions.  A new client entered group today and had quite a few questions and wanted to discuss some things that occurred since her last week.  This took a fair amount of group time, but she seemed to feel relieved and thanked group.  Group was very helpful in feedback and thoughtful questions.  This counselor gave clients handout on radical acceptance.  They will do a scenario on this.      Attestation: Dr. De La Rosa - Provides oversight and supervision of care.     Group Attendance:  Attended group session    Patient's response to the group topic/interactions:  cooperative with task, discussed personal experience with topic, and expressed readiness to alter behaviors    Patient appeared to be " "Actively participating and Attentive.        Client specific details:   [place]  welcomed new client and gave helpful feedback.  This appeared to help client feel more at ease and [place] said \"I was on my first day at one time too and now I will be starting Ph 3, work this program and grow, as the time flies\"  Today's session focused on dim 3 radical acceptance. dim 4 powerless and unmanagability and check in of all dimensions  Client said of powerlessness and unmanagability:  \"I do think I am powerless over alcohol\"   I also think I have power in choosing not to drink.  She said the discussions on radical acceptance are helping her grow her awareness of loving self better, and how this and recovery can work together.    Dim 1: ASHISH: no change reported   Dim 2: medical issues or appts: None  Dim 3: \"no thoughts of self harm or concerns\"  on likert scale 1-low and 10 -high:   Anxiety: 0  stress: 1  depression 0      issues to discuss and helpful coping: this group, assignments  Dim 4 on likert scale 1-low and 10 -high:  10 on  motivation for sobriety  Dim 5: cravings, warning signs, triggers: only 1 this week and ironic, it was when I met with the friend who is sober.  \"He kept repeating things and it was exhausting\"  Dim 6 sober support motivation:  6, I met with a friend who has been sober 10 years.    P)  Do scenario on Radical acceptance, handout. Discuss on Wed.     Attestation:  Dr. De La Rosa - Provides oversight and supervision of care.  Judi Dodd, MS, LADC, LPC                                                       "

## 2023-10-23 NOTE — PROGRESS NOTES
[facility] Weekly Treatment Plan Review      Attestation:   Dr. De La Rosa - Medical Director - Provides oversight and supervision of care.        Date Span: Monday: 10/23/23 through Fuad 10/29/23     Date     Group Therapy 2 hours Completed tues skills  2 hours   0 hours 0 hours       Individual Therapy  33 min              Family Therapy                 Psychoeducation                 Other (Specify)                   Client had no absences this week     Wk o f  Virginia is excused this week due to holiday and to recovering from her surgery last week.  All notes below are based on last attendance and will be updated when client returns next week    Clients individual sessions noted below. Client had 1 excused absence this week, due to having surgery.    Client has no absences this week    Client has one excused absence this week , due to medical appts.  Client excused from group , due to medical appointment.     Program Running Totals: (No Phase 1 IOP due to this program being only OP level of care)  Total # of Phase 2 OP Group Sessions: 41 for 82  Virginia gets 60 sessions for 120 hours at this time and has 47    Total # of Phase 3 OP Recovery Management Group Sessions: None    Total # of 1:1 Sessions:  1 hour BALWINDER   1 hour 5/30  7/3, , , 10/23                                    Darinel , , ( & -Onelia)                    Weekly Treatment Plan Review     Treatment Plan initiated on: .    Dimension1: Acute Intoxication/Withdrawal Potential -   Previous Dimension Ratin  Current Dimension Ratin  Date of Last Use 22  Any reports of withdrawal symptoms - No  wk of : Client reports no change in sobriety date and exhibits no sx of intoxication or withdrawal.      Dimension 2: Biomedical Conditions & Complications -   Previous Dimension Ratin  Current Dimension Ratin    Medical Concerns: wk of  "10/23:  wk of 10/16, \"doing very well\"   wk of 10/9 \"still healing from surgery, but doing very well now\"  wk of 10/2 \"still healing from surgery, but good\"  wk of 9/25: Client denies any new or worsening medical issues.  wk of 9/18  Virginia discussed that she was not aware before this surgery that chances are high she will still need a transplant someday.  She said she went through a range of emotions, as she was not prepared for this. \"I had to go through what I did, and now feel better about keeping my life on the positive and 'I will deal with that bridge when I get to it\".  Group expressed empathy for all she has been through and also her positive attitude of gratitude.    I told her Celia,  on LT asked her time frame to be done, since Virginia just had surgery and there is no issue on the horizon about getting a new liver, I wanted to make sure I was understanding so as to know if [place] and LT are good with normal schedule, which would have her completing about the end of November. Virginia met with Dr. Saavedra today and he said as far as anyone could ever see there is no thought or idea she will have a transplant.  Sometimes a person needs a new liver years from now, but things are looking good at this time.  Virginia said she has nothing special she wants to work on \"I keep learning from all that we do and I am sure building bonds with the others\"     wk of 9/11 \"no concerns, continuing to recovery from surgery\"  Surgery 8/29 for her cancer  /821/2023 Reports having no new or worsening health conditions. Reports having an appointment this week on 8/23/2023 with her Cardiologist.     wk of 8/14 No concerns I did  my pre op eval for my upcoming surgery with Dr. Bear and nurse Sakina Glass  wk of 8/7 No new concerns  wk of 7/31:  no, just good news that they may be able to remove all my tumore and I may not need a liver.  WK of 7/24-Client has an appointment with surgeon regarding increase in tumor. She is excused " "from programming due time conflict.  wk of 7/17 She had appt with radiology on 7/14, continues to work with her team due to LT, cancer and rash.  [place]ia told group she is dealing with some news, medically, but working with her Drs.  She said she isn't wanting to share a whole lot, until she gethers her information, but she did want group to know she has support and appreciates just being able to share what she did  wk 7/10Jasmyn reports rash and working with , also had CT and MRI related to ongoing care for cancer and need for liver.  wk of 7/3  I get an infusion 1x per month and usually by week 3, I feel more sore and more tired, but I am grateful for the infusions.  \"no concerns beyond ongoing liver cancer and Crohns\"  wk of 6/12 She saw Jazmin Barrios at Urgent care for skin issues.  She also said she met with her GI practioner  She reports having liver cancer and Crohn's disease.  \"right now I am doing pretty well  Outside medical appointments this week (list provider and reason for visit)on 9/15 medical appt with Jeannette Cervantes gastroenterologist   8/29 Surgery with care team for cancer  wk of 8/14 No concerns I have my pre op eval with Dr. Glass, for my upcoming surgery   Wk of 8/7 No appts this week   wk of 7/31: no appts.  wk of 7/24 she saw surgeon Junior Bonilla, she is dealing with her liver tumor  wk of 7/17 She had appt with radiology, Dr. Shah,on 7/14, continues to work with her team due to LT, cancer and rash  : wk of 7/6 she saw Susanna RAZA for rash on leg.  Had a PeTH test 7/7, see below. No issues  : 6/13 she saw Jazmin Meng PA-C for a flare up of her psoriasis likely related to her other medical issues, per client     Current Medications & Medication Changes:  Current Outpatient Medications   Medication    ammonium lactate (AMLACTIN) 12 % external cream    clobetasol (TEMOVATE) 0.05 % external cream    furosemide (LASIX) 40 MG tablet    inFLIXimab (REMICADE) 100 MG injection    " Multiple Vitamins-Minerals (MULTIVITAMIN & MINERAL PO)    senna-docusate (SENOKOT-S/PERICOLACE) 8.6-50 MG tablet    spironolactone (ALDACTONE) 100 MG tablet    triamcinolone (KENALOG) 0.1 % external cream    UNABLE TO FIND     No current facility-administered medications for this encounter.     Facility-Administered Medications Ordered in Other Encounters   Medication    BREEZA Lemon-Lime flavored oral liquid for Neutral Abdominal/Pelvic Imaging 1,000 mL    hyoscyamine (LEVSIN/SL) sublingual tablet 125 mcg     Medication Prescriber:  Linda Macdonald  Taking meds as prescribed? Yes  Medication side effects or concerns:  no        Dimension 3: Emotional/Behavioral Conditions & Complications -   Previous Dimension Ratin  Current Dimension Ratin  PHQ2:       10/4/2023     4:00 PM 2023     2:00 PM 9/15/2023     9:12 AM 2023     5:00 PM 2023     8:00 AM 2023     1:34 PM 2023    10:00 AM   PHQ-2 (  [employer])   Q1: Little interest or pleasure in doing things 0 0 0 0 0    0 0 0   Q2: Feeling down, depressed or hopeless 0 0 0 0 0    0 0 0   PHQ-2 Score 0 0 0 0 0    0 0 0   Q1: Little interest or pleasure in doing things     Not at all     Q2: Feeling down, depressed or hopeless     Not at all     PHQ-2 Score     0        GAD2:       2023     9:54 AM 2023    10:00 AM 2023     8:00 AM 2023     5:00 PM 2023     2:00 PM 10/4/2023     4:00 PM   TAINA-2   Feeling nervous, anxious, or on edge 1 1 0    0    0    0    0 1 0 1   Not being able to stop or control worrying 1 0 0    0    0    0    0 0 0 1   TAINA-2 Total Score 2 1 0    0    0    0    0 1 0 2     PROMIS 10-Global Health (all questions and answers displayed):       2023     9:58 AM 2023     7:32 PM 2023    10:00 AM 2023     8:02 AM 2023     5:00 PM 2023     2:00 PM 10/4/2023     4:00 PM   PROMIS 10   In general, would you say your health is: Good Good  Good      In general, would you say  your quality of life is: Good Good  Very good      In general, how would you rate your physical health? Fair Fair  Fair      In general, how would you rate your mental health, including your mood and your ability to think? Good Good  Very good      In general, how would you rate your satisfaction with your social activities and relationships? Good Good  Excellent      In general, please rate how well you carry out your usual social activities and roles Good Good  Very good      To what extent are you able to carry out your everyday physical activities such as walking, climbing stairs, carrying groceries, or moving a chair? Completely Completely  Mostly      In the past 7 days, how often have you been bothered by emotional problems such as feeling anxious, depressed, or irritable? Sometimes Rarely  Rarely      In the past 7 days, how would you rate your fatigue on average? Mild Mild  Mild      In the past 7 days, how would you rate your pain on average, where 0 means no pain, and 10 means worst imaginable pain? 3 3  3      In general, would you say your health is: 3 3 3 3    3    3    3    3 3 4 4   In general, would you say your quality of life is: 3 3 4 4    4    4    4    4 3 4 4   In general, how would you rate your physical health? 2 2 3 2    2    2    2    2 3 3 3   In general, how would you rate your mental health, including your mood and your ability to think? 3 3 4 4    4    4    4    4 4 5 4   In general, how would you rate your satisfaction with your social activities and relationships? 3 3 5 5    5    5    5    5 4 5 4   In general, please rate how well you carry out your usual social activities and roles. (This includes activities at home, at work and in your community, and responsibilities as a parent, child, spouse, employee, friend, etc.) 3 3 5 4    4    4    4    4 4 4 4   To what extent are you able to carry out your everyday physical activities such as walking, climbing stairs, carrying groceries,  "or moving a chair? 5 5 4 4    4    4    4    4 3 4 3   In the past 7 days, how often have you been bothered by emotional problems such as feeling anxious, depressed, or irritable? 3 2 2 2    2    2    2    2 2 2 1   In the past 7 days, how would you rate your fatigue on average? 2 2 2 2    2    2    2    2 2 2 1   In the past 7 days, how would you rate your pain on average, where 0 means no pain, and 10 means worst imaginable pain? 3 3 4 3    3    3    3    3 2 3 2   Global Mental Health Score 12 13 17 17    17    17    17    17 15 18 17   Global Physical Health Score 15 15 14 14    14    14    14    14 14 15 15   PROMIS TOTAL - SUBSCORES 27 28 31 31    31    31    31    31 29 33 32     Mental health diagnosis none  Date of last SIB:  never  Date of  last SI:  never  Date of last HI: never  Behavioral Targets:  see tx plan  Risk factors:  Client dealing with liver disease/cancer and Crohns, client needs a new liver,   Protective factors:  spirituality, forward/future oriented thinking, safe and stable environment, regular physical activity, living with other people and structured day  Current MH Assignments:  see tx plan      Narrative: wk of 10/23.  She attended individual session with this counselor and  completed PhQ-2, DAD-2 and PROMIS.  we discussed her Consequences of Use assignment.  She named her health as the biggest consequence but also her family was worried, and she did not know a lot of this until close to starting tx.  She said another consequence \"I am much more clear, not \"hangover or foggy brain\" She said the discussions on radical acceptance are helping her grow her awareness of loving self better, and how this and recovery can work together.  \"no thoughts of self harm or concerns\"  on likert scale 1-low and 10 -high:   Anxiety: 0  stress: 1  depression 0      issues to discuss and helpful coping: this group, rissa Coleman was able to teach back the techniques discussed on disputing negative " "thoughts from the positive psychology information.  She said it was helpful   wk of 10/16 She participated in discussion on radical acceptance and also gave and received feedback well from group today. \"I appreciate feedback\"   3: \"no thoughts of self harm or concerns\"  on likert scale 1-low and 10 -high:   Anxiety: 1  stress: 1  depression 0      issues to discuss and helpful coping: this group, assignments, talking with my . She participated in psychoeducation on  Distress Tolerance-Radical Acceptance   This topic will focus on skills designed to keep our pain from turning into unnecessary suffering.  [place]ia received handout and participated in discussion on various strategies to effectively manage distressing situations and said this was a helpful discussion.  Client was able to teach back what radical acceptance means and said \"this is good stuff\"    wk of 10/9   \"no thoughts of self harm or concerns\"  on likert scale 1-low and 10 -high:   Anxiety: 0  stress: 1  depression 0      issues to discuss and helpful coping: meeting and talking with others  wk of 10/2 She completed PHQ-2, TAINA-2, and PROMISE and she expressed no need for further discussion at this time.  Client took part in a discussion of trauma as associated with addiction and was introduced to the idea of radical acceptance. She participated in a \"[place]\" guided visualization which she described as helpful.   wk of 9/25: no thoughts of self harm. On likert scale 1-low and 10 -high:   Anxiety: 0; depression: 0; stress: 0   wk of 9/18 Today I had Virginia answer 3 questions before doing the daily reading focused on acceptance, as it related to self and to what life presents. We had a long discussion on This.   \"no thoughts of self harm or concerns\"  on likert scale 1-low and 10 -high:   Anxiety: 1  stress: 1  depression 1  issues to discuss and helpful coping: talking with my care team, using breath work. Talking to my .   She said she " "has not felt like eating much, but the positive of it is she has lost 10 pounds.  [place]ia  completed PROMIS, scoring 33. TAINA-2, scoring 0, and PHQ-2, scoring 0.  She said she sometimes has worries in her life, but overall feels blessed to not have a lot of anxiety or depression.   wk of 9/11  : \"no thoughts of self harm or concerns\"  on likert scale 1-low and 10 -high:   Anxiety:  2  stress: 1 depression 0  issues to discuss and helpful coping:  right now resting  wk of 8/28, RR changed to 0 from 1.  Virginia continues to report healthy coping for anxiety symptoms.  She remains optimistic and finds using tools learned are helpful, as is discussion with supportive people.  : \"no thoughts of self harm or concerns\"  on likert scale 1-low and 10 -high:   Anxiety:  7  stress: 1 depression 2     Virginia will be having her surgery tomorrow.  She discussed \"anxiety mireya high, yet I am hopeful\"  She said she has done all she can with her health and not drinking and preparing herself mentally.  She said she was glad she had group to attend and gets \"immense support\"  wk of 8/21/2023 She reports her anxiety is down and she experiencing no stress or depression. Client worked on her Dimension Three goals this week as she learned about and discussed the topics of Shame, Stigma and Mindfulness.     wkof 8/14  Ways I can think differently: listen to my critical voice and change my thoughts  Ways I can do things differently:  talk to myself like I would a friend.  \"no thoughts of self harm or concerns\"  on likert scale 1-low and 10 -high:   Anxiety:  0  stress: 0 depression 0  issues to discuss and helpful coping:  doing fun things, attending this group, being sober  wk of 8/7 ratings on likert scale 1-low and 10 -high: Anxiety: 2 stress: 1 depression 2. She participated in discussion on \"change the channel\" when getting into an anxiety loop. \"It is a simple way to do something more productive. [place] actively participated in discussion on " "Thought distortions and discussed which ones are the ones she actively wants to focus on for the time being: catastrophising and prediction.  She discussed  two situations where the techniques could be helpful, including: Should's and musts and not setting up unrealistic expectations of how she or life should be.  I want to remind myself \"until someone has walked a mile in my shoes, they don't get to  me and the same goes for me with them.  Virginia actively participated in meditation and said it was good to take that time to be.  \"I liked it\"  wk of 7/31  she participated in psychoeducation on Understanding my anxiety   This topic helped client to gain awareness of thoughts and feelings associated with anxiety and act with awareness when feeling anxiety and use helpful coping.  Client did actively participate in group discussions, and was able  to identify anxious thoughts, physical feelings when having anxiety: racing heart, shaking, confusion  \"why me\"  She was able to  identify 2 helpful coping methods: breath, talk with her  who is a comfort, stop the story telling/negativity when aware.  Virginia discussed that her tumor is shrinking and she is \"cautiously optimistic\". She said she wanted to share that she has many feelings associated with this, \"it even could mean I won't need a LT, but that remains to be seen\"  I told Virginia she has been shown how with our spirituality and belief, sometimes things can happen that were never anticipated.  She said \"so true\" I never had an idea that was even a part of my hope\"      Wk of 7/24- client denies thoughts of self-harm. On Likert scall 1-low and 10-high client rates anxiety 1  depression 0 and stress 2. She shared her new diagnosis of tumor growth has impacts stress and anxiety.   wk of 7/17: no thoughts of self harm or concerns\" on likert scale 1-low and 10 -high:   Anxiety:  5  stress: 5 depression 1  issues to discuss and Helpful coping:  All related to my " "medical, but I have great medical team and family support.  Client said of the reading:I like the ongoing discussions of shame and this reading brought me awareness that I have some to deal with regarding the nature of why many get liver cancer.       wk of 7/10:  She was able to repeat the steps of 5 senses, as a review.   \"no thoughts of self harm or concerns\".  Clients feedback to group member  on likert scale 1-low and 10 -high:   Anxiety:  2  stress: 2  depression 2  issues to discuss and Helpful coping: talking with you guys.  I had a craving that was pretty big this week.  Psychoeducation/Skills The thinking-feeling connection. This topic will address information from the [place] [employer] about how our thoughts influence our feelings.  Client was able to define automatic thoughts and make a connection with some that she has.   She said that she has been continueing to gain awareness of her beliefs vs perceptions vs what is a thought and what is a feeling.  Discussed that reminding herself it is ok to have thoughts and feelings, but they are not facts for other people.    Client participated in lovingkindness meditation and said it was just what she needed today and it that it was helpful and grounding.  \"Thank you for doing it\"    wk of 7/3: Clients participated in 5 senses psychoeducation discussion, participated in guided practice and was able to name 5 parts, and said she feels it will be a helpful tool  \"no thoughts of self harm or concerns\".   I discussed the research on gratitude journals and expressed she has seemed like she has an \"attitude of gratitude\".  She said she feels blessed and hopes she does.  on likert scale 1-low and 10 -high:   Anxiety:  0  stress: 0 depression 0  issues to discuss and Helpful coping: I am learning to say no, and that boundaries are good    wk of 6/26  Attended MH skills group with Darinel Au.    She participated in psychoeducation on Core " "beliefsThis topic will discuss core beliefs, as a lens through which we see life and how that affects our choices.  Virginia said the discussion on core beliefs is something she will spend some time on, as the awareness is helpful.  I told her we will follow up on Wednesday with more that can be helpful when we notice harmful core beliefs.   \"no thoughts of self harm or concerns\"  on likert scale 1-low and 10 -high:   Anxiety:  1 stress: 1 depression 0  issues to discuss and Helpful coping: learning about self, anxiety and stress  wk of 6/19 \"no thoughts of self harm or no concerns\".  She reports on likert scale 1-low and 10 -high:   Anxiety:  1  stress: 1 depression 0  issues to discuss and Helpful coping: getting to know people better in group, hearing other's helpful coping.  Attended MH skills group with Darinel Au.  We discussed anticipating some situations and \"gearing up\" by practicing deep breathing. I reminded client of previous discussions where beliefs often guide how we feel about certain situations, but that we can reframe them, or look at them for the particular situation.      Wk of 6/12  I am feeling much better and less anxiety about group attendance as I continue to attend and get to know others. \"no thoughts of self harm or concerns\". On likert scale 1-low and 10 -high:   Anxiety:  1  stress: 1 depression 1  issues to discuss and Helpful coping:  Breathing, getting to others in group  Wk of 6/6 \"no thoughts of self harm\"  Attended MH skills group with Darinel Au  Week of 5/31/23 :Client reports feeling anxious due to first treatment experience, not knowing what to expect, her  Mother's declining health-needing a placement for higher level of care, spouse recently lost his job-need to obtain COBRA insurance and unable to care for grandchildren because of attending treatment. Client was saw Traci Aguilar and has not seen them in about 6 weeks. To follow up with primary counselor re: " "seeing psychotherapist or return to [facility].  Client rates the following on a scale of 1 (low) to 10 (high):  Anxiety- 5  Depression -1  Stress- 5 as a result of above concerns.   Client participated in psychotherapy/education on self-care, introduction to relaxation meditation-tensing and relaxing muscles, and sleep hygiene.  Current Mental Health symptoms include: . Active interventions to stabilize mental health symptoms this week :   At SI her PHQ-2 score was 0 and his TAINA-2 score was 1.  PROMISE 10 was  31:  She reports that at this time she is dealing with her mother's living situation and health issues. She reports that her father has passed away and that she is estranged from 1 sister.         Dimension 4: Treatment Acceptance / Resistance -   Previous Dimension Ratin  Current Dimension Ratin  RENEE Diagnosis:  Alcohol Use Disorder   303.90 (F10.20) Severe In a controlled environment  Commitment to tx process/Stage of change- contemplation  RENEE assignments - see tx plan      Narrative - wk of 10/23.  [place]ia and LUIS ENRIQUE did an individual session to discuss Ph 3 and we went over a couple individual assignments.  Client said of powerlessness and unmanagability:  \"I do think I am powerless over alcohol\"   I also think I have power in choosing not to drink.  wk of 10/16    10 on  motivation for sobriety   Virginia discussed feeling ready to begin Ph 3 pretty soon. We will meet next Monday to discuss plan.  She also said she has learned about patience for self and others.  She said some things take time.  \"I am better able to slow down, hear others, listen to myself\"   10/9 [place] said \"I am your disease\" made her tear up and it is awakening\"  she said she is so much more accepting of her disease and not drinking, but if sh e wasn't this would sure be a motivator\"  wk of 10/9  she participated in psychoeducation on Readiness to change (stages of change).  This topic gave a general overview of stage of change models " "and how they apply to the personal experience of each patient.  She participated in discussion, received handout on stages and also listed which stage she was in with: Tx: action   sobriety/abstinence:  action    sober support: determination      community support:  contemplation     my individual goal: action  \"I am your disease\" and she said \"it is so powerful\"       wk of 10/2 [place] expressed understanding of limits of discussion in group, including politics and Christianity. Counselor emphasized that  beliefs are ok, just sometimes how one express them might be unfair or sound judgemental. She said she is motivated for sobriety 10/10  wk of 9/25: motivation for sobriety =10 on likert scale 1-low and 10 -high. She reports accepting that she is an alcoholic.  wk of 9/18  on likert scale 1-low and 10 -high: 10 on motivation for sobriety. She   wk of 8/28  on likert scale 1-low and 10 -high: 10   motivation for sobriety 10.  Motivated to continue a life of sobriety, \"I am learning so much about self.  wk of 8/21/2023 Client expressed this week about her alcoholism that that she liikes that \"I'm getting better not as shameful.\" That for her it has helped to accept alcoholism, \"occurs as a disease.\"     wk of 8/14  Virginia told group \"I know I am an alcoholic\" she said she is so glad to have had to find her way to the group, even if it was through health issues or she would still be doing damage.  motivation for sobriety; 10 she likes being sober and reports she likes learning and attending group.   wk of 8/7  motivation for sobriety 10.  Motivation is high to have quality of life  wk of 7/31  on likert scale 1-low and 10 -high:  motivation for sobriety: 10   WK of 7/24-motivation 10 on Likert scale.she expressed strong commitment to sobriety, see notes dated 7/25 for more details.  wk of 7/17:  on likert scale 1-low and 10 -high:  motivation for sobriety: 10+  wk of 7/10  My motivation for sobriety is 10/10\".  I think it is " "so important to attend and be dedicated to support in this group, and to \"say things outloud, so it takes the power away, as you say, Alana\"  wk of 7/3  on likert scale 1-low and 10 -high:  motivation for sobriety 10.  Virginia said Mondays session really stuck with her and she thought about a lot of things and is really understanding why attendance and building on the days and lessons is important.     wk of 6/26  sober support motivation:  2, but I think motivation will grow as I keep settling in and I am motivated 10/10 for sobreity.    Wk of 6/19:  Client reports motivation for sobriety is 10 (high) Motivation for attending community support 5 (on scale 1 low-10 high).   Virginia expressed to her group how helpful they are to her and how much she appreciates all that members shared about their stories.  She said she gains such helpful insight for self   Week of 6/12 Virginia discussed that she has been late a couple times, but realized with 2 other members gone, how this speaks to the importance of being on time and valuing and respecting others time, too.   She will also miss tomorrows group due to a long awaited medical appt. Dim 4: on likert scale 1-low and 10 -high:  Motivation for sobriety:  10  motivation for sober support attendance: not doing yet  Wk of 6/6  Virginia also said she finds motivation for sobriety in her family and friends, \"but really deep down need to do this for myself\"  Told counselor and group how she didn't think she would get much out of tx and already is getting so much and is grateful for older adult group   Week of 5/29/23: Client reports motivation for sobriety is 10 (high) Motivation for attending community support 5 (on scale 1 low-10 high).  The patient is motivated, to explore treatment.  She said \"of course I am partly here to fulfill requirements for liver transplant, but I want to learn healthy coping for lifelong sobriety.      Dimension 5: Relapse / Continued Problem Potential - " "  Previous Dimension Ratin  Current Dimension Ratin  Relapses this week - None  Urges to use - None  UA results -   No results found for this or any previous visit (from the past 168 hour(s)).      Using ReSet Clem: N/A    Narrative- wk of 10/23   report on cravings, warning signs, triggers: only 1 this week and ironic, it was when I met with the friend who is sober.  \"He kept repeating things and it was exhausting\"  We went over her goals and tx plan, due to moving to  3.   wk of 10/16 cravings, warning signs, triggers: She said she does have some cravings and triggers, but feels confidnet in her tools and support.  \"I above all remember to take a step back, take a breath and think about risk vs rewards of using\"   Wk of 10/9 cravings, warning signs, triggers: \"none this week\" .  She said  \"I have even more acceptance of living a life of sobriety\". Group congratulated her on her movement and acceptance and hard work   wk of 10/2: RR changed 20 1.  She reports having craving of 1 when having dinner with friends. \"I ordered a club soda, and indulged in conversation. She reports cravings at 1 of 10 triggered by watching football with drinking friends. She reports dealing with cravings by getting away from the group for a short time by taking a walk outdoors. Client was able to teach back information for urge surfing and said this is a good tool.  She was congratulated by writer and group peers for having obtained over 9 months of sobriety    wk of : cravings =1 on likert scale 1-low and 10 -high. Reported dinner with friends as a trigger but reported only minimal cravings. She reports engaging in conversation and ordering a club soda as helpful coping skills.  wk of   She participated in Psychoeducation/Skills Relapse Prevention (RENEE)  This topic gave a general overview of basic relapse prevention, including definitions of warning signs, triggers and cravings and the importance of addressing healthy " "coping skills for ongoing relapse prevention.Patient was able to  name 4 of their warning signs and triggers, including:  Too much time with others whom don't like to stop drinking, concerts/shows if I don't have a plan  Patient was able to name 4 healthy coping mechanisms for dealing with their warning signs and triggers: including: making a plan of structure  wk of 9/11 Sights, sounds and situations that are triggers : concerts and comedy shows, but support, discussion and a fun NA drink are how I deal with it.   Virginia said each day she is healing more from surgery but is still quite sore.  \"I have had absolutely no cravings or desire to use alcohol\".   Gone week of 9/4 for surgery   wk of 8/28  cravings, warning signs, triggers: none I went to a [employer] game and had no cravings.\"   Wk of 8/21/2023 She reports having no cravings to drink this week and having a \"wonderful week.\"       Wk of 8/14   holley discussed a big warning sign for self is thinking I can drink again, especially socially.  II think about  it regularly.  Group discussed that awareness of this is good and it is likely normal to do this.  I encouraged her to continue to discuss this and down the road to keep in mind with sponsor, therapist, etc. And work through it.  I told her it is really good she can say this outloud.   Something I am doing well: having plans of structure, especially when around situations I used to drink in     any cravings, warning signs, triggers: the [place] concert could have been but wasn't .  I have supportive people and that helps, but so does the realization of my use patterns and how I feel better not using  wk of 8/7 Virginia said that a trigger for her was going to the [employer] for a comedy show. \"I haven't been there since starting tx\"  My  and I just kept walking and he also said \"I got you\".  She said her cravings only lasted a couple minutes.  We discussed how this can be common and some group members said they cannot " "go to the same places at all because of that kind of trigger.  She said \"I get it, and will continue to be aware of situations like this   wk of 7/31   cravings, warning signs, triggers: None this week   WK of 7/24-client denies triggers, cravings or warning signs this past week.  wk of 7/17:  cravings, warning signs, triggers:i went to a musical festival, had a few cravings, but also had insight when watching others use, \"glad it isn't me\"  Wk of 7/10 Had a PeTH test 7/7, see above. No issues. She discussed the Serenity Prayer and that she finds it helpful.  She also discussed having cravings at a 4 of 10 when cooking, and what she found helpful:  Processed, talked with , talk with group today about it.   Virginia said she really had quite a learning week with events in group, discussions and assignments.  \"My heart goes out to the new person the other day, but also I had some anger about all the disruptions and negativity from her\"  \"I am ready to move on, and learn what I learned, and know that could be any one of us\"  wk of 7/5  cravings, warning signs, triggers: none.  My adult kids are caring and asked if I thought I would be OK going to [facility], as there is drinking there.  She told them she appreciates the concern, but feels with them and little to no triggers, for quite some time, she didn't feel it was a problem.  Virginia said she had a wonderful time with her family and is not concerned about use over 4th of July holiday either, she has a good plan for spending time with family and focusing on good food.   wk of 6/26  Dim 5: cravings, warning signs, triggers: none.  Wed reading was focused blame.  It said blaming is hiding, that when we blame others we try to hide our character defects.  We discussed the meaning of character defects. \"I was in big denial for a long time, it took being told I would need a new liver for me to really realize what drinking was doing. \"it hit me like a ton of bricks " "when I finally really heard it\"  wk of 6/19  She participated in  Psychoeducation/Skills: Self-Care.  Client s completed self-care assessment and described self-care as feeling better physically. Client identified physical self-care they do well- attending medical appointments, one they do poorly or needs improvement- eating healthy food and barriers that prevent themselves from self-care - cost of food is expensive, the shopping and prep.  This topic will give a general overview of self-care: physical, environmental, emotional, social and spiritual. It will explore importance of self-care and the impact on recovery.   Wk of 6/12. Dim 5: warning signs, triggers and cravings: none   helpful coping: attending group. Examples of benefits of drinking or drug use, or other behaviors Physical, Social mental or emotional: confidence, escape from reality, \"friends\"     Main drawbacks connected to these: not real friends that only want to drink together.  Headaches and slurred speech.  Remoarse  Benefits connected to abstinence from addictive behaviors:more able to do me and be me, I enjoy trud friends, I remember things better and will have better memories      Drawbacks you see connected with abstinence from these:  Wk of6/6 she participated in the daily reading and said she liked it.  Virginia also gave meaningful feedback to client who discussed use episode: \"it could be anyone of us\"  She said this is important and she is learning from others sharing.  Week of 5/29/23: Client denies riggers or warning signs this past week. She said she liked the saying \"it is just as easy to create good habits as bad ones\"   Patient reports family members and her  are concerned about their substance use.  Patient reports their recovery goals are \"abstinence forever.\"  She has had no previous detoxes or treatments and needs to gain insight into healthy coping for relapse prevention.      Dimension 6: Recovery Environment -   Previous " "Dimension Ratin  Current Dimension Ratin  Family Involvement - not at this time  Summarize attendance at family groups and family sessions - NA  Family supportive of treatment?  Yes    Community support group attendance - no  Recreational activities - some camping, cooking and boating    Narrative - wk of 10/23  I met with a friend who has been sober 10 years.  wk of 10/16.  I find this group, my  and family, and many friends to be very supportive.  For structure and sober support for the weekend,  Virginia said she will be spending time with her family much of weekend, including a 2 year old bday party.  She said she is going to spend time journaling each day too.   wk of 10/9  5, but I am opening to this more and more\"  She participated in discussion on how to create healthy boundaries and was able to name physical boundaries like our body, personal space, sexual orientation, and privacy.   wk of 10/2  . Virginia  participated in discussion of how addiction affects relationships and said: things that are hard are  even more difficult\"   wk of : sober support motivation: 4 on likert scale 1-low and 10 -high. She denies attending any sober support meetings. Client participated in an interactive educational session and learned about and discussed \"Community Based, Sober Support. this week. Client learned about 14 community based sober support meeting options and shared that she is interested in attending community based sober support meetings, that she only wants to attend the in person meetings not Zoom meetings. She shared after the discussions on the different types of sober support meetings that the one she \"liked the Rajendra and the 12 Steps\" meeting.     wk of   sober support motivation:  5, but I did make a call to a friend who has been sober 13 years and we are going to meet in a few weeks .   we discussed focused on self care over the next days and She intends to rest, babysit her grandkids " "with her  overnight, see her sister whom she has not seen for 4 months.     wk of 9/11 sober support motivation:  5, but I will look into SMART Recovery. She participated in Psychoeducation/Skills Goal Setting (RENEE)  This topic will give a general overview of short, intermediate and long term goals including the value of goal setting. She completed her goal setting worksheet and discussed things that can get in the way of her goal: others that suck energy out of her, old thinking about how nice a drink would be  Plan of structure for weekend: She said she will go camping with family this weekend in their new RV, she also has to rest a lot.  \"I love football and will watch most any game\"  absent wk o f9/4  wk of 8/28:\" I have a great deal going on with my upcoming surgery, will likely look into sober support when things settle down.  Right now my group is support\"  wk of 8/21 Client worked on her dimension 6 goals this week as she learned and discussed more about sober support. She continues to report a low desire to attend outside community based sober support meetings, however she does appear to be open to learning more about it.     wk of 8/14 Virginia discussed that she went to [place] with friends who were drinking, but that they all said they would not need to drink. She said she appreciated that but it really didn't bother her in the least, as she likes being sober. Group discussed this type of situation and some group members said it would have been hard for them. Virginia said if it ever is, she will change what she does or ask them not to use. \"I have very dear friends and I know they mean it when they say the don't need to drink around me.  sober support motivation: 5   8/7 \"I am at 5 on wanting to attend community support, but find group support very helpful!  wk of 7/31  sober support motivation:  I have so much going on with medical and this meeting 3x per week, that realistically, I will not likely look " "into community support until after my surgery at the end of the month.   WK of 7/24-client's support are her family, she is not attending support groups and motivation to attend is 2 on Likert scale. Client shared she has learned this week is this is a continuous journey and staying positive is huge. To support her sobriety this week she will continue to surround herself with good people. She is grateful for home, shelter and stability.  wk of 7/17:  sober support motivation: this group and my family.     wk of 7/10: \"I am motivated for outside support at 2/10, but when I get done with Tuesday group, I think my motivtiaon will be higher\"  wk of 7/5   We discussed \"The power of Solitude\" and how spending time alone with ourselves may not be easy or even desirable, it it is key to getting to know self.  I emphasized that studies tell us we need each other to survive and be happy, but also when we loose the ability to be alone with ourselves, sometimes our overstimulated nervous systems suffer from no place to rest and recharge.  She said this is good to discuss, as she may even consider doing it a bit more, as it \"really does recharge me\" She said she is pretty good about finding alone time and also uses journaling.  I asked client how this applies to recovery and treatment:She said she will often take a topic from group, or a question and journal about.  She said it really helps   Wk of 6/26. She said sober support motivation is  2, but I think motivation will grow as I keep settling in  I talked with clients about taking responsibility for our actions, so we can be empowered and own our life and also change if we need to. We discussed resources including AA, SMART Recovery.  I encouraged clients to start exploring meetings.  Wk of 6/19.  Virginia reports with having group 3x per week, it is a good deal of support, \" I will continue to learn about peer support\"  wk of 6/12client discussed resources sober support, " "including AA, Liver Transplant support and [place] recovery connection  wk of 6/6  goals for the wknd: look into volunteering at [facility] for writing card.  \"no community support attendance at this time\"   Client is not currently attending community sup[ort and does not have a sponsor. Client's supportive people are her family. Client shared she has friends and has not talked with many about her current situation, because \"I am a private person,\" (treatment and needing transplant).  Patient reports they are involved in community of holden activities    They reports spirituality impacts recovery in the following ways:  \"There was some changes as far as .  We do belong to a [employer] but it's been a number of years since we've been.\"  Patient believes her spirituality and sobriety are connected. Patient reported having 3 children, all adults.  Patient has 2 grandchildren.    She lives with her spouse and son. recreational/leisure activities: camping, cooking, boating, playing board games and card games.  Patient is currently disabled due to her Crohn's Disease.  Patient reports their income is obtained through SSDI disability; spouse.  Patient does not identify finances as a current stressor.         Progress made on transition planning goals: just began tx    Justification for Continued Treatment at this Level of Care:   She reports she wants sobriety for her life.  Needs to continue to learn long term relapse prevention coping skills, she reports finding that attending group is helpful for her and she is learning but she is feeling close to ready to be completed.She said she did not think she would get much out of this but is getting so much support and understanding of healthy coping.  She is not attending community support, at this time, \"but I find my group so helpful and now met with a sober friend and will attend the meetings he goes to.  She is working on goals and assignments.    Virginia continues to discuss gratitude " for sobriety and meeting her group members.    Treatment coordination activities this week: wk of 9/25 staffed with SOLANGE Ponce. wk 9/18 Celia,  on LT asked her time frame to be done, since Virginia just had surgery and there is no issue on the horizon about getting a new liver   wk 8/28 Reviewed notes from sub Galen Rowland  7/31 reviewed Onelia Singh notes and discussed client with her.  7/18  Discussed client info with sub Onelia Singh   Discussed client info with margaret Singh  Need for peer recovery support referral? No    Discharge Planning: Not at this time      Has vulnerable adult status change? No    Interdisciplinary Clinical Supervision including:  no    Are Treatment Plan goals/objectives effective? Yes  *If no, list changes to treatment plan:    Are the current goals meeting client's needs? Yes  *If no, list the changes to treatment plan.  Plan:  Continue per Master Treatment Plan    *Client agrees with any changes to the treatment plan: Yes  *Client received copy of changes: Yes  *Client is aware of right to access a treatment plan review: Yes     Staff Members Contributing To Weekly Review:      Judi Dodd, MS, LADC, LPC

## 2023-10-23 NOTE — PROGRESS NOTES
"Attestation:   Dr. De La Rosa - Medical Director - Provides oversight and supervision of care.   INDIVIDUAL SESSION  Start 11:17  END  11:50    Virginia attended an individual session today. We discussed her health, how she feels she is doing on goals and assignments and the plan going forward as she is ready to begin Ph 3.  We discussed some of her tx plan and goals, but will finish on Wed.  She will come in 15 min early to complete.  She feels she is on schedule for goals and assignments and I agreed.  We discussed her Consequences of Use assignment.  She named her health as the biggest consequence but also her family was worried, and she did not know a lot of this until close to starting tx.  She said another consequence \"I am much more clear, not \"hangover or foggy brain\"  Virginia said she sometimes has worries in her life, but overall feels blessed to not have a lot of anxiety or depression.  \"And I feel even better with sobriety\".   Virginia meets with Dr. Saavedra and her medical team as needed.  She said she feels blessed to be recovering very well from her surgery. She feels her health is a number one motivator for ongoing sobriety, but also she feels she has much more clarity and energy in her daily life and it is so worth it.    I: Consequences of Use assignment,  Encouragement, feedback, goal planning    A) feels excited to begin Ph 3. working closing with Drs on health issues.  Virginia sees hope and a future in [facility].    P) Continue to work with Health Team.  Finish tx plan goals Wed.     Judi Dodd, MS, LADC, LPC    "

## 2023-10-25 ENCOUNTER — TELEPHONE (OUTPATIENT)
Dept: BEHAVIORAL HEALTH | Facility: CLINIC | Age: 58
End: 2023-10-25
Payer: MEDICARE

## 2023-10-25 ENCOUNTER — HOSPITAL ENCOUNTER (OUTPATIENT)
Dept: BEHAVIORAL HEALTH | Facility: CLINIC | Age: 58
Discharge: HOME | End: 2023-10-25
Attending: FAMILY MEDICINE
Payer: COMMERCIAL

## 2023-10-25 PROCEDURE — H2035 A/D TX PROGRAM, PER HOUR: HCPCS | Mod: HQ

## 2023-10-25 NOTE — GROUP NOTE
"Group Therapy Documentation    PATIENT'S NAME: Abiola Matute  MRN:   [id number]  :   1965  ACCT. NUMBER: [id number]  DATE OF SERVICE: 10/25/23  START TIME: 12:00 PM  END TIME:  2:00 PM  FACILITATOR(S): Judi Dodd LADC  TOPIC: BEH Group Therapy  Number of patients attending the group:  5  Group Length:  2 Hours    Group Therapy Type: Addiction and Psychoeducation    Summary of Group / Topics Discussed:    Balanced Lifestyle , Boundaries, Journaling, Mindfulness, Relationships, Self-Care Activities, and psychoeducation      Attestation: Dr. De La Rosa - Provides oversight and supervision of care.     Today's group focused on a reading  from \"Each Day a New Beginning\"  on flexibility and its ability to stretch our awareness.  It discussed allowing self more than 1 response or thought in situations and being aware responses can even be contradictory.   I also suggested to clients that we can fear something and want something all at the same time or that things can be both beautiful and ugly at the same time.  Group gave thoughts and then I asked them how this applies to doing treatment or support groups. We discussed scenarios/clients examples of practicing Radical Acceptance,  from  assignment.  Group discussed points from Grassho[employer]! video, [place] to Love, [place] and Peace  Group  discussed,  and received a handout on factors that interfere with Radical acceptance.  I went over an example of when something difficult is arising in life, like fear or anger, to pause and say to \"I see you anger\" or \"I see you fear\" I will just be with you.  This can help us get out of the reactive space of fight, flight or flee and open our heart as well as gain awareness.  Group participated in psychoeducation below and received an assignment on \"Noticing\" to become more familiar with their inner critic. They will do this for next week.       Summary of Group / Topics Discussed:       Psychoeducation/Skills " "Positive Psychology   This topic will address various ways to implement and sustain overall health and wellness by focusing on the various aspects of positive psychology.     Objective(s):   -Client will learn strategies to reduce stress and increase overall health and wellness.   -Client will identify a personal understanding of how attitudes affect outcomes.      Structure (modalities, homework, worksheets, etc)      -Facilitate group discussion on positive psychology and how disputing negative thoughts can affect feelings and actions. .   -Use teach-back techniques to ensure patients understanding.     Expected therapeutic outcome(s):     -Client will understand how dispute negative thoughts to sustain improved health and wellness.   -Client will use positive psychology and stress management tools into their daily life.      Therapeutic outcome(s) measured by:    -Patient's ability to teach-back the techniques learned in group.         Group Attendance:  Attended group session    Patient's response to the group topic/interactions:  cooperative with task, discussed personal experience with topic, and expressed readiness to alter behaviors    Patient appeared to be Actively participating and Attentive.        Client specific details:   Virginia told group what she has been learning and that it is \"crazy\" how quickly time has gone since starting tx.  She will attend  3 next week. Virginia was able to teach back the techniques discussed on disputing negative thoughts from the positive psychology information.  She said it was helpful.Today's session focused on dim 3 Positive psychology and  radical acceptance   Virginia said about the opening reading:  Helpful and taking a breath, noticing a variety of thoughts and feelings is kind of freeing.  She had an example of using radical acceptance and shared with group.    Clients discussed that this is a hard concept. I said it is not about perfection or always being able to do it, " it is more about Noticing and returning to noticing with compassion for self and others, not so much about right and wrong.    P)  Do Noticing assignment, Start PH 3 next week.  Next week she will attend with this group, being it is not full and there are no other Ph 3 people.   She and I will meet individually as needed.    Attestation:  Dr. De La Rosa - Provides oversight and supervision of care.  Judi Dodd, MS, [facility], LPC

## 2023-10-25 NOTE — TELEPHONE ENCOUNTER
----- Message from SOLANGE Grimaldo sent at 10/25/2023  9:37 AM CDT -----  Regarding: Start Ph 3, remove PH 2 starting Monday 10/30    Scheduling Request   Start Ph 3, remove PH 2 starting Monday 10/30    Patient Name:  STEVEN MCNAMARA [[id number]]  Location of programming: [place]  Start Date:  11/ 1 / 2023  Group: [id number] on Wednesday at 9:00 to 11:00                Attending Provider (MD): Dr. De La Rosa    Number of visits to be scheduled: 5  Duration of Appointment in minutes: 120 minutes  Visit Type: Zoom - [id number]      Thank You,  Alana Dodd M.S., [facility], LPC  Lead Counselor  [phone]

## 2023-10-25 NOTE — PROGRESS NOTES
[facility]  Adult Substance Use Disorder Program  Treatment Plan Requirements     Attestation: Dr. Chris Juares - Provides oversight and supervision of care    These services are provided by the facility for each patient/client according to the individual's treatment plan:  Individual and group counseling  Education  Transition services  Services to address any co-occurring mental illness  Service coordination    Criteria for discharge:  Patients/clients are discharged from the program following completion of the entire program including all phases of treatment or acceptance of other post-treatment referrals such as sober supportive living, or aftercare at other facilities.  Patients/clients may also be discharged for inappropriate behavior or substance use.    Favorable Discharge - Patients/clients have completed agreed upon treatment goals, understand their diagnosis and appear motivated for continued recovery.  Guarded Discharge - Patients/clients have demonstrated some understanding of their diagnosis and recovery process, and have completed some of their treatment goals.  This prognosis also includes patients/clients who have completed some treatment goals but have not made commitment to community support or follow through with referrals.  Unfavorable Discharge - Patients/clients have not completed agreed upon treatment goals due to their own choice, have limited understanding of their diagnosis, and have shown minimal or inconsistent behavior conducive to recovery.  Those patients/clients discharged due to behavioral problems will also be unfavorable discharges.    Adult [facility] Treatment Plan                                Patient Name:  Juju ESCALANTE  MRN: [id number]  :[birth year]   Identified Gender: female  Admit Date: /  Current Treatment Phase: OP  Last Updated: 10/25    SUBSTANCE USE DISORDER(S): Alcohol Use Disorder Severe, 303.90 (F10.20), in early  "remission      Dimension 1: Acute Intoxication/Withdrawal Potential, Risk Level: 0    Needs identified from Comprehensive Assessment Summary: The pt reports her last use of alcohol was 12/29/22  Update: Initial    Date of last use: 12/29/22  Patient currently receiving medication assisted therapy (MAT): No  Current or recent withdrawal symptoms: No    Focus area: Client is attending OP program to work on sobriety/abstinence in order to be eligible for a liver transplant  Date Assigned: 5/30/23  Clinical Goal: remain abstinent throughout your OP tx or report any use to this counselor and group  Clients Goal:  \"I intend to continue being sober\"  Goal needs to be completed prior to discharge? [x]  Treatment Strategies:   Report any (1 or more) substance use to counselor to determine any changes that need to be made to plan  Target Date:11/22/23  Date Completed:         Dimension 2: Biomedical Conditions/Complications, Risk Level: 3    Needs identified from Comprehensive Assessment Summary: She is being considered for a potential liver transplant.  She has cancer of the liver and also has Crohns disease.    Her PCP is Linda Macdonald and she also works closely with Dr. Jeannette Cervantes and Dr. Syed  Update: Initial     Focus area: Client is working with Liver transplant team, on his weight loss and will get PeTH test as recommended  Clinical Goal: Continue to follow recommendations of Medical team and report any changes that affect your treatment  Patient Goal:  \"I will continue to work with my care team, to ensure success and I will report anything that effects my treatment\"    Goal needs to be completed prior to discharge? []  Methods/Strategies (must include amount and frequency):   1. Vriginia will report any changes (1x or more) to physical health to counselor.   2. [place] will attend all scheduled doctor's appointments and continue to discuss health issues and any weight changes   3. [place] will take medications as " prescribed and report any (1x or more) changes  4. Take PEth test and Drug screen test as recommended 1x or more                                                                                                Target Date: 11/22/23                            She has been doing all 4 of these and should continue  Completion Date:      Dimension 3: Emotional/Behavioral/Cognitive, Risk Level: 1    Needs identified from Comprehensive Assessment Summary:  At [facility] her PHQ-2 score was 0 and his TAINA-2  score was 1.  PROMISE 10 was 31.    She reports no previous mental health diagnoses, but said she has some anxiety around her diagnosis    Update: 10/25    Focus area: Client reports sometimes getting anxious, especially with things like starting a new group or around her diagnosis  Clinical Goal:Gain insight into healthy coping for symptoms of anxiety   Patient Goal:            Goal needs to be completed prior to discharge? []  Methods/Strategies (must include amount and frequency):    1. Virginia will attend 6 or MH skills group sesssions with Darinel Au  Target Date: 7/ 30/23  Completion Date:   3: Assignment(s):   Discussion of emotions and healthy coping, assignment 1x Did   participate in discussion of mindfulness -   Participated in 2 or more mindfulness meditations    did wk of 7/12m 8/30,   Target Date: 9/30  Completion Date: 8/30,   she wants to continue working on mindfulness/meditation and healthy coping for anxiety, date extended  Participate in 2 or more meditations  Healthy coping for anxiety 1x  Target Date: 11/30  Completion Date:       Dimension 4: Readiness to Change, Risk Level: 0    Needs identified from Comprehensive Assessment Summary: Although Virginia needs treatment due to recommendations of LT team, she reports she also wants to learn about healthy coping.    Update: 10/25    Focus area: Client may have external motivation for sobriety, due to recommendations by LT tearm, he may find it helpful to learn  "about the disease and internal motivation    Clinical Goal:Enhance understanding of the disease and motivation for ongoing sobriety  Patient Goal: \"I am open to learning about this disease\"  Goal needs to be completed prior to discharge? []  Methods/Strategies (must include amount and frequency):   1.Virginia will attend 3, 2-hour groups per week. Days/Times: Monday, Tuesday and Wed  After completion of  6-7 weeks of MH group Client will attend 2-2 hour groups per week on Mondays and Wednesdays   2.Virginia will contact staff if unable to attend at [phone].  Target Date:6/15  Completion Date:   Assignments:    \"I am your disease\" 1x      did 10/9  Consequences of Use assignment 1x or more   did 10/23  Target Date:10/30  Completion Date:       Dimension 5: Relapse/Continued Use/Continued Problem Potential, Risk Level: 1    Needs identified from Comprehensive Assessment Summary: Virginia has had no previous treatments or detoxifications.  She needs to gain insight and understanding into warning signs, triggers and healthy coping for lifelong sobriety.   Update: 10/25    Focus area: Gain insight and coping tools for  relapse prevention, she wants ongoing sobriety   Clinical Goal:  Gain insight into and develop  coping skills and relapse prevention strategies to utilize when experiencing trigger, cravings and or urges  Client Goal: \"I cannot drink with a transplant or to get a transplant, I am willing to learn all I can\"  Goal needs to be completed prior to discharge? []  Methods/Strategies (must include amount and frequency):   1.Virginia will share during each session's check-in any urges and addictive thinking to better understand their pattern of use and to prevent return to use.  2 Assignment(s):   List warning signs and triggers, share your top 5 and healthy coping strategies 1x     did 9/18  Urge surfing for cravings 1x   did    Target Date: 5/15  Completion Date: 5/15      Focus area: Gain insight and coping tools for  " "relapse prevention, she wants ongoing sobriety after tx completion  Clinical Goal:  Develop  coping skills and relapse prevention strategies to utilize when experiencing trigger, cravings and or urges  Client Goal: I have learned so much and am committed to ongoing sobriety.   Goal needs to be completed prior to discharge? []  Methods/Strategies (must include amount and frequency):   1.Virginia will share during each session's check-in any urges and addictive thinking to better understand their pattern of use and to prevent return to use.  2 Assignment(s):   \"What my relapse looks like (1x)    Recovery Care Plan  1x    Target Date: 11/22  Completion Date:       Dimension 6: Recovery Environment, Risk Level: 1      Needs identified from Comprehensive Assessment Summary: she has supportive family and is involved in activities, but does not attend any community support     Update: 10/25      Focus area/need:Virginia is not involved in peer support and does have some structure,but wants to continue to work on balance and structure  Clinical Goal: gain information on resources and  expand structure and support in your life  Client Goal: \"I don't know much about building support\"  Goal needs to be completed prior to discharge? []  Methods/Strategies (must include amount and frequency):   1. Receive 2 or more resource handouts on community support and discuss 2 or more times        did 9/25  . Assignment(s):  Goal setting assignment:                   did   8/4  Finding Balance in recovery 1x  Attend individual meeting with sober friend Chris, report to group  Target Date: 11/20  Completion Date:           Resources  Resources to which the patient is being referred for problems when problems are to be addressed concurrently by another provider: No Referrals Needed     [x] Contact insurance to ensure referrals are in network    Vulnerable Adult Review   [X] Review of the facility Abuse Prevention plan was reviewed with the patient "   [X] No individual abuse plan is necessary   [ ] In addition to the facility Abuse Prevention plan, an Individual Abuse Plan will be put in place     Virginia attests their date of last use as 12/29/22 . She attests they have participated in the creation of this treatment plan. Client has been provided a copy of this treatment plan and is in agreement with how this plan is written and will be stored in the electronic record.                                                                                                 Patient Signature: _________________________________ Date: _________    Counselor Signature: ______________________________ Date: _________          These services are provided by the facility for each patient/client according to the individual's treatment plan:  Individual and group counseling  Education  Transition services  Services to address any co-occurring mental illness  Service coordination    Criteria for discharge:  Patients/clients are discharged from the program following completion of the entire program including all phases of treatment or acceptance of other post-treatment referrals such as sober supportive living, or aftercare at other facilities.  Patients/clients may also be discharged for inappropriate behavior or substance use.    Favorable Discharge - Patients/clients have completed agreed upon treatment goals, understand their diagnosis and appear motivated for continued recovery.  Guarded Discharge - Patients/clients have demonstrated some understanding of their diagnosis and recovery process, and have completed some of their treatment goals.  This prognosis also includes patients/clients who have completed some treatment goals but have not made commitment to community support or follow through with referrals.  Unfavorable Discharge - Patients/clients have not completed agreed upon treatment goals due to their own choice, have limited understanding of their diagnosis, and have  shown minimal or inconsistent behavior conducive to recovery.  Those patients/clients discharged due to behavioral problems will also be unfavorable discharges.

## 2023-11-01 ENCOUNTER — HOSPITAL ENCOUNTER (OUTPATIENT)
Dept: BEHAVIORAL HEALTH | Facility: CLINIC | Age: 58
Discharge: HOME | End: 2023-11-01
Attending: FAMILY MEDICINE
Payer: COMMERCIAL

## 2023-11-01 PROCEDURE — H2035 A/D TX PROGRAM, PER HOUR: HCPCS | Mod: HQ

## 2023-11-01 NOTE — PROGRESS NOTES
[facility] Weekly Treatment Plan Review      Attestation:   Dr. De La Rosa - Medical Director - Provides oversight and supervision of care.        Date Span: Monday: 10/30/23 through 23     Date     Group Therapy  PH 3 0 hours  completed Completed tues skills  2 hours ph 3 0 hours 0 hours       Individual Therapy                Family Therapy                 Psychoeducation                 Other (Specify)                   Client had no absences this week     Wk o f  Virginia is excused this week due to holiday and to recovering from her surgery last week.  All notes below are based on last attendance and will be updated when client returns next week    Clients individual sessions noted below. Client had 1 excused absence this week, due to having surgery.    Client has no absences this week    Client has one excused absence this week , due to medical appts.  Client excused from group , due to medical appointment.     Program Running Totals: (No Phase 1 IOP due to this program being only OP level of care)  Total # of Phase 2 OP Group Sessions: 41 for 82  Virginia gets 60 sessions for 120 hours at this time and has 47    Total # of Phase 3 OP Recovery Management Group Sessions: 1 for 2    Total # of 1:1 Sessions:  1 hour BALWINDER   1 hour 5/30  7/3, , , 10/23                                    Darinel , , ( & -Onelia)                    Weekly Treatment Plan Review     Treatment Plan initiated on: .    Dimension1: Acute Intoxication/Withdrawal Potential -   Previous Dimension Ratin  Current Dimension Ratin  Date of Last Use 22  Any reports of withdrawal symptoms - No  wk of : Client reports no change in sobriety date and exhibits no sx of intoxication or withdrawal.      Dimension 2: Biomedical Conditions & Complications -   Previous Dimension Ratin  Current Dimension Ratin    Medical  "Concerns: wk of 10/30: \"no concerns, doing well.  RR changed from 2 to 1.  She continues to meet with her care team as needed but after her surgery for cancer she is doing well and \"no immediate issues with liver\"  wk of 10/16, \"doing very well\"   wk of 10/9 \"still healing from surgery, but doing very well now\"  wk of 10/2 \"still healing from surgery, but good\"  wk of 9/25: Client denies any new or worsening medical issues.  wk of 9/18  Virginia discussed that she was not aware before this surgery that chances are high she will still need a transplant someday.  She said she went through a range of emotions, as she was not prepared for this. \"I had to go through what I did, and now feel better about keeping my life on the positive and 'I will deal with that bridge when I get to it\".  Group expressed empathy for all she has been through and also her positive attitude of gratitude.    I told her Celia,  on LT asked her time frame to be done, since Virginia just had surgery and there is no issue on the horizon about getting a new liver, I wanted to make sure I was understanding so as to know if Virginia and LT are good with normal schedule, which would have her completing about the end of November. Virginia met with Dr. Saavedra today and he said as far as anyone could ever see there is no thought or idea she will have a transplant.  Sometimes a person needs a new liver years from now, but things are looking good at this time.  Virginia said she has nothing special she wants to work on \"I keep learning from all that we do and I am sure building bonds with the others\"     wk of 9/11 \"no concerns, continuing to recovery from surgery\"  Surgery 8/29 for her cancer  /821/2023 Reports having no new or worsening health conditions. Reports having an appointment this week on 8/23/2023 with her Cardiologist.     wk of 8/14 No concerns I did  my pre op eval for my upcoming surgery with Dr. Bear and nurse Sakina Glass  wk of 8/7 No new " "concerns  wk of 7/31:  no, just good news that they may be able to remove all my tumore and I may not need a liver.  WK of 7/24-Client has an appointment with surgeon regarding increase in tumor. She is excused from programming due time conflict.  wk of 7/17 She had appt with radiology on 7/14, continues to work with her team due to LT, cancer and rash.  Virginia told group she is dealing with some news, medically, but working with her Drs.  She said she isn't wanting to share a whole lot, until she gethers her information, but she did want group to know she has support and appreciates just being able to share what she did  wk 7/10She reports rash and working with , also had CT and MRI related to ongoing care for cancer and need for liver.  wk of 7/3  I get an infusion 1x per month and usually by week 3, I feel more sore and more tired, but I am grateful for the infusions.  \"no concerns beyond ongoing liver cancer and Crohns\"  wk of 6/12 She saw Jazmin Barrios at Urgent care for skin issues.  She also said she met with her GI practioner  She reports having liver cancer and Crohn's disease.  \"right now I am doing pretty well  Outside medical appointments this week (list provider and reason for visit)on 9/15 medical appt with Jeannette Cervantes gastroenterologist   8/29 Surgery with care team for cancer  wk of 8/14 No concerns I have my pre op eval with Dr. Glass, for my upcoming surgery   Wk of 8/7 No appts this week   wk of 7/31: no appts.  wk of 7/24 she saw surgeon Junior Bonilla, she is dealing with her liver tumor  wk of 7/17 She had appt with radiology, Dr. Shah,on 7/14, continues to work with her team due to LT, cancer and rash  : wk of 7/6 she saw Susanna RAZA for rash on leg.  Had a PeTH test 7/7, see below. No issues  : 6/13 she saw Jazmin Meng PA-C for a flare up of her psoriasis likely related to her other medical issues, per client     Current Medications & Medication Changes:  Current " Outpatient Medications   Medication    ammonium lactate (AMLACTIN) 12 % external cream    clobetasol (TEMOVATE) 0.05 % external cream    furosemide (LASIX) 40 MG tablet    inFLIXimab (REMICADE) 100 MG injection    Multiple Vitamins-Minerals (MULTIVITAMIN & MINERAL PO)    senna-docusate (SENOKOT-S/PERICOLACE) 8.6-50 MG tablet    spironolactone (ALDACTONE) 100 MG tablet    triamcinolone (KENALOG) 0.1 % external cream    UNABLE TO FIND     No current facility-administered medications for this encounter.     Facility-Administered Medications Ordered in Other Encounters   Medication    BREEZA Lemon-Lime flavored oral liquid for Neutral Abdominal/Pelvic Imaging 1,000 mL    hyoscyamine (LEVSIN/SL) sublingual tablet 125 mcg     Medication Prescriber:  Linda Macdonald  Taking meds as prescribed? Yes  Medication side effects or concerns:  no        Dimension 3: Emotional/Behavioral Conditions & Complications -   Previous Dimension Ratin  Current Dimension Ratin  PHQ2:       10/25/2023     3:00 PM 10/4/2023     4:00 PM 2023     2:00 PM 9/15/2023     9:12 AM 2023     5:00 PM 2023     8:00 AM 2023     1:34 PM   PHQ-2 (  [employer])   Q1: Little interest or pleasure in doing things 0 0 0 0 0 0    0 0   Q2: Feeling down, depressed or hopeless 0 0 0 0 0 0    0 0   PHQ-2 Score 0 0 0 0 0 0    0 0   Q1: Little interest or pleasure in doing things      Not at all    Q2: Feeling down, depressed or hopeless      Not at all    PHQ-2 Score      0       GAD2:       2023     9:54 AM 2023    10:00 AM 2023     8:00 AM 2023     5:00 PM 2023     2:00 PM 10/4/2023     4:00 PM 10/25/2023     3:00 PM   TAINA-2   Feeling nervous, anxious, or on edge 1 1 0    0    0    0    0 1 0 1 1   Not being able to stop or control worrying 1 0 0    0    0    0    0 0 0 1 0   TAINA-2 Total Score 2 1 0    0    0    0    0 1 0 2 1     PROMIS 10-Global Health (all questions and answers displayed):       2023      7:32 PM 5/25/2023    10:00 AM 7/19/2023     8:02 AM 8/16/2023     5:00 PM 9/20/2023     2:00 PM 10/4/2023     4:00 PM 10/25/2023     3:00 PM   PROMIS 10   In general, would you say your health is: Good  Good       In general, would you say your quality of life is: Good  Very good       In general, how would you rate your physical health? Fair  Fair       In general, how would you rate your mental health, including your mood and your ability to think? Good  Very good       In general, how would you rate your satisfaction with your social activities and relationships? Good  Excellent       In general, please rate how well you carry out your usual social activities and roles Good  Very good       To what extent are you able to carry out your everyday physical activities such as walking, climbing stairs, carrying groceries, or moving a chair? Completely  Mostly       In the past 7 days, how often have you been bothered by emotional problems such as feeling anxious, depressed, or irritable? Rarely  Rarely       In the past 7 days, how would you rate your fatigue on average? Mild  Mild       In the past 7 days, how would you rate your pain on average, where 0 means no pain, and 10 means worst imaginable pain? 3  3       In general, would you say your health is: 3 3 3    3    3    3    3 3 4 4 4   In general, would you say your quality of life is: 3 4 4    4    4    4    4 3 4 4 4   In general, how would you rate your physical health? 2 3 2    2    2    2    2 3 3 3 4   In general, how would you rate your mental health, including your mood and your ability to think? 3 4 4    4    4    4    4 4 5 4 4   In general, how would you rate your satisfaction with your social activities and relationships? 3 5 5    5    5    5    5 4 5 4 4   In general, please rate how well you carry out your usual social activities and roles. (This includes activities at home, at work and in your community, and responsibilities as a parent, child,  spouse, employee, friend, etc.) 3 5 4    4    4    4    4 4 4 4 4   To what extent are you able to carry out your everyday physical activities such as walking, climbing stairs, carrying groceries, or moving a chair? 5 4 4    4    4    4    4 3 4 3 4   In the past 7 days, how often have you been bothered by emotional problems such as feeling anxious, depressed, or irritable? 2 2 2    2    2    2    2 2 2 1 2   In the past 7 days, how would you rate your fatigue on average? 2 2 2    2    2    2    2 2 2 1 1   In the past 7 days, how would you rate your pain on average, where 0 means no pain, and 10 means worst imaginable pain? 3 4 3    3    3    3    3 2 3 2 1   Global Mental Health Score 13 17 17    17    17    17    17 15 18 17 16   Global Physical Health Score 15 14 14    14    14    14    14 14 15 15 17   PROMIS TOTAL - SUBSCORES 28 31 31    31    31    31    31 29 33 32 33     Mental health diagnosis none  Date of last SIB:  never  Date of  last SI:  never  Date of last HI: never  Behavioral Targets:  see tx plan  Risk factors:  Client dealing with liver disease/cancer and Crohns, client needs a new liver,   Protective factors:  spirituality, forward/future oriented thinking, safe and stable environment, regular physical activity, living with other people and structured day  Current MH Assignments:  see tx plan      Narrative:wk of 10/30  Client completed PHQ-2, TAINA-2, PROMISE.  It also focused on structure over the weekend:  My structure includes:  a trip to [place] for Mon's 90.    She participated in mindfulness vs mindlessness.Virginia received handout on mindfulness and mindlessness and participated in discussion:  This all looks good, but hard.  I like how this is layed out. I like the ideas on here.  I reminded clients to pull the handout out each day, even.  I said it is not about looking at it and judging if you did or didn't do something, it is about using it for awareness to bring you to the present moment.   "It is about empowering self to be fully present with what is.  and way down but reading this article and having 10 things from the list she will use as regulars is helpful.  She said she is learning how boundary setting is helping with unneeded anxiety in some instances. She also completed  Healthy coping for anxiety .  She said her anxiety has gone way down but reading this article and having 10 things from the list she will use as regulars is helpful.  She said she is learning how boundary setting is helping with unneeded anxiety in some instances  .  Client completed PHQ-2, TAINA-2, PROMISE.  It also focused on stru   wk of 10/23.  She attended individual session with this counselor and  completed PhQ-2, DAD-2 and PROMIS.  we discussed her Consequences of Use assignment.  She named her health as the biggest consequence but also her family was worried, and she did not know a lot of this until close to starting tx.  She said another consequence \"I am much more clear, not \"hangover or foggy brain\" She said the discussions on radical acceptance are helping her grow her awareness of loving self better, and how this and recovery can work together.  \"no thoughts of self harm or concerns\"  on likert scale 1-low and 10 -high:   Anxiety: 0  stress: 1  depression 0      issues to discuss and helpful coping: this group, assignments.   Virginia was able to teach back the techniques discussed on disputing negative thoughts from the positive psychology information.  She said it was helpful   wk of 10/16 She participated in discussion on radical acceptance and also gave and received feedback well from group today. \"I appreciate feedback\"   3: \"no thoughts of self harm or concerns\"  on likert scale 1-low and 10 -high:   Anxiety: 1  stress: 1  depression 0      issues to discuss and helpful coping: this group, assignments, talking with my . She participated in psychoeducation on  Distress Tolerance-Radical Acceptance   This topic " "will focus on skills designed to keep our pain from turning into unnecessary suffering.  Virginia received handout and participated in discussion on various strategies to effectively manage distressing situations and said this was a helpful discussion.  Client was able to teach back what radical acceptance means and said \"this is good stuff\"    wk of 10/9   \"no thoughts of self harm or concerns\"  on likert scale 1-low and 10 -high:   Anxiety: 0  stress: 1  depression 0      issues to discuss and helpful coping: meeting and talking with others  wk of 10/2 She completed PHQ-2, TAINA-2, and PROMISE and she expressed no need for further discussion at this time.  Client took part in a discussion of trauma as associated with addiction and was introduced to the idea of radical acceptance. She participated in a \"[place]\" guided visualization which she described as helpful.   wk of 9/25: no thoughts of self harm. On likert scale 1-low and 10 -high:   Anxiety: 0; depression: 0; stress: 0   wk of 9/18 Today I had Virginia answer 3 questions before doing the daily reading focused on acceptance, as it related to self and to what life presents. We had a long discussion on This.   \"no thoughts of self harm or concerns\"  on likert scale 1-low and 10 -high:   Anxiety: 1  stress: 1  depression 1  issues to discuss and helpful coping: talking with my care team, using breath work. Talking to my .   She said she has not felt like eating much, but the positive of it is she has lost 10 pounds.  Virginia  completed PROMIS, scoring 33. TAINA-2, scoring 0, and PHQ-2, scoring 0.  She said she sometimes has worries in her life, but overall feels blessed to not have a lot of anxiety or depression.   wk of 9/11  : \"no thoughts of self harm or concerns\"  on likert scale 1-low and 10 -high:   Anxiety:  2  stress: 1 depression 0  issues to discuss and helpful coping:  right now resting  wk of 8/28, RR changed to 0 from 1.  Virginia continues to report " "healthy coping for anxiety symptoms.  She remains optimistic and finds using tools learned are helpful, as is discussion with supportive people.  : \"no thoughts of self harm or concerns\"  on likert scale 1-low and 10 -high:   Anxiety:  7  stress: 1 depression 2     Virginia will be having her surgery tomorrow.  She discussed \"anxiety mireya high, yet I am hopeful\"  She said she has done all she can with her health and not drinking and preparing herself mentally.  She said she was glad she had group to attend and gets \"immense support\"  wk of 8/21/2023 She reports her anxiety is down and she experiencing no stress or depression. Client worked on her Dimension Three goals this week as she learned about and discussed the topics of Shame, Stigma and Mindfulness.     wkof 8/14  Ways I can think differently: listen to my critical voice and change my thoughts  Ways I can do things differently:  talk to myself like I would a friend.  \"no thoughts of self harm or concerns\"  on likert scale 1-low and 10 -high:   Anxiety:  0  stress: 0 depression 0  issues to discuss and helpful coping:  doing fun things, attending this group, being sober  wk of 8/7 ratings on likert scale 1-low and 10 -high: Anxiety: 2 stress: 1 depression 2. She participated in discussion on \"change the channel\" when getting into an anxiety loop. \"It is a simple way to do something more productive. Virginia actively participated in discussion on Thought distortions and discussed which ones are the ones she actively wants to focus on for the time being: catastrophising and prediction.  She discussed  two situations where the techniques could be helpful, including: Should's and musts and not setting up unrealistic expectations of how she or life should be.  I want to remind myself \"until someone has walked a mile in my shoes, they don't get to  me and the same goes for me with them.  Virginia actively participated in meditation and said it was good to take that time to " "be.  \"I liked it\"  wk of 7/31  she participated in psychoeducation on Understanding my anxiety   This topic helped client to gain awareness of thoughts and feelings associated with anxiety and act with awareness when feeling anxiety and use helpful coping.  Client did actively participate in group discussions, and was able  to identify anxious thoughts, physical feelings when having anxiety: racing heart, shaking, confusion  \"why me\"  She was able to  identify 2 helpful coping methods: breath, talk with her  who is a comfort, stop the story telling/negativity when aware.  Virginia discussed that her tumor is shrinking and she is \"cautiously optimistic\". She said she wanted to share that she has many feelings associated with this, \"it even could mean I won't need a LT, but that remains to be seen\"  I told Virginia she has been shown how with our spirituality and belief, sometimes things can happen that were never anticipated.  She said \"so true\" I never had an idea that was even a part of my hope\"      Wk of 7/24- client denies thoughts of self-harm. On Likert scall 1-low and 10-high client rates anxiety 1  depression 0 and stress 2. She shared her new diagnosis of tumor growth has impacts stress and anxiety.   wk of 7/17: no thoughts of self harm or concerns\" on likert scale 1-low and 10 -high:   Anxiety:  5  stress: 5 depression 1  issues to discuss and Helpful coping:  All related to my medical, but I have great medical team and family support.  Client said of the reading:I like the ongoing discussions of shame and this reading brought me awareness that I have some to deal with regarding the nature of why many get liver cancer.       wk of 7/10:  She was able to repeat the steps of 5 senses, as a review.   \"no thoughts of self harm or concerns\".  Clients feedback to group member  on likert scale 1-low and 10 -high:   Anxiety:  2  stress: 2  depression 2  issues to discuss and Helpful coping: talking with you guys.  " "I had a craving that was pretty big this week.  Psychoeducation/Skills The thinking-feeling connection. This topic will address information from the [place] for [facility] [employer]ventions about how our thoughts influence our feelings.  Client was able to define automatic thoughts and make a connection with some that she has.   She said that she has been continueing to gain awareness of her beliefs vs perceptions vs what is a thought and what is a feeling.  Discussed that reminding herself it is ok to have thoughts and feelings, but they are not facts for other people.    Client participated in lovingkindness meditation and said it was just what she needed today and it that it was helpful and grounding.  \"Thank you for doing it\"    wk of 7/3: Clients participated in 5 senses psychoeducation discussion, participated in guided practice and was able to name 5 parts, and said she feels it will be a helpful tool  \"no thoughts of self harm or concerns\".   I discussed the research on gratitude journals and expressed she has seemed like she has an \"attitude of gratitude\".  She said she feels blessed and hopes she does.  on likert scale 1-low and 10 -high:   Anxiety:  0  stress: 0 depression 0  issues to discuss and Helpful coping: I am learning to say no, and that boundaries are good    wk of 6/26  Attended MH skills group with Darinel Au.    She participated in psychoeducation on Core beliefsThis topic will discuss core beliefs, as a lens through which we see life and how that affects our choices.  Virginia said the discussion on core beliefs is something she will spend some time on, as the awareness is helpful.  I told her we will follow up on Wednesday with more that can be helpful when we notice harmful core beliefs.   \"no thoughts of self harm or concerns\"  on likert scale 1-low and 10 -high:   Anxiety:  1 stress: 1 depression 0  issues to discuss and Helpful coping: learning about self, anxiety and stress  wk of 6/19 \"no " "thoughts of self harm or no concerns\".  She reports on likert scale 1-low and 10 -high:   Anxiety:  1  stress: 1 depression 0  issues to discuss and Helpful coping: getting to know people better in group, hearing other's helpful coping.  Attended MH skills group with Darinel Au.  We discussed anticipating some situations and \"gearing up\" by practicing deep breathing. I reminded client of previous discussions where beliefs often guide how we feel about certain situations, but that we can reframe them, or look at them for the particular situation.      Wk of 6/12  I am feeling much better and less anxiety about group attendance as I continue to attend and get to know others. \"no thoughts of self harm or concerns\". On likert scale 1-low and 10 -high:   Anxiety:  1  stress: 1 depression 1  issues to discuss and Helpful coping:  Breathing, getting to others in group  Wk of 6/6 \"no thoughts of self harm\"  Attended MH skills group with Darinel Au  Week of 5/31/23 :Client reports feeling anxious due to first treatment experience, not knowing what to expect, her  Mother's declining health-needing a placement for higher level of care, spouse recently lost his job-need to obtain COBRA insurance and unable to care for grandchildren because of attending treatment. Client was saw Traci Aguilar and has not seen them in about 6 weeks. To follow up with primary counselor re: seeing psychotherapist or return to [facility].  Client rates the following on a scale of 1 (low) to 10 (high):  Anxiety- 5  Depression -1  Stress- 5 as a result of above concerns.   Client participated in psychotherapy/education on self-care, introduction to relaxation meditation-tensing and relaxing muscles, and sleep hygiene.  Current Mental Health symptoms include: . Active interventions to stabilize mental health symptoms this week :   At SI her PHQ-2 score was 0 and his TAINA-2 score was 1.  PROMISE 10 was  31:  She reports that at this " "time she is dealing with her mother's living situation and health issues. She reports that her father has passed away and that she is estranged from 1 sister.         Dimension 4: Treatment Acceptance / Resistance -   Previous Dimension Ratin  Current Dimension Ratin  RENEE Diagnosis:  Alcohol Use Disorder   303.90 (F10.20) Severe In a controlled environment  Commitment to tx process/Stage of change- contemplation  RENEE assignments - see tx plan      Narrative - wk of 10/30  RR changed to 0.  She is preparing to complete tx and reports wanting ongoing sobriety.  wk of 10/23.  [place]ia and LUIS ENRIQUE did an individual session to discuss Ph 3 and we went over a couple individual assignments.  Client said of powerlessness and unmanagability:  \"I do think I am powerless over alcohol\"   I also think I have power in choosing not to drink.  wk of 10/16    10 on  motivation for sobriety   Virginia discussed feeling ready to begin Ph 3 pretty soon. We will meet next Monday to discuss plan.  She also said she has learned about patience for self and others.  She said some things take time.  \"I am better able to slow down, hear others, listen to myself\"   10/9 [place] said \"I am your disease\" made her tear up and it is awakening\"  she said she is so much more accepting of her disease and not drinking, but if sh e wasn't this would sure be a motivator\"  wk of 10/9  she participated in psychoeducation on Readiness to change (stages of change).  This topic gave a general overview of stage of change models and how they apply to the personal experience of each patient.  She participated in discussion, received handout on stages and also listed which stage she was in with: Tx: action   sobriety/abstinence:  action    sober support: determination      community support:  contemplation     my individual goal: action  \"I am your disease\" and she said \"it is so powerful\"       wk of 10/2 [place] expressed understanding of limits of discussion in group, " "including politics and Gnosticism. Counselor emphasized that  beliefs are ok, just sometimes how one express them might be unfair or sound judgemental. She said she is motivated for sobriety 10/10  wk of 9/25: motivation for sobriety =10 on likert scale 1-low and 10 -high. She reports accepting that she is an alcoholic.  wk of 9/18  on likert scale 1-low and 10 -high: 10 on motivation for sobriety. She   wk of 8/28  on likert scale 1-low and 10 -high: 10   motivation for sobriety 10.  Motivated to continue a life of sobriety, \"I am learning so much about self.  wk of 8/21/2023 Client expressed this week about her alcoholism that that she liikes that \"I'm getting better not as shameful.\" That for her it has helped to accept alcoholism, \"occurs as a disease.\"     wk of 8/14  Virginia told group \"I know I am an alcoholic\" she said she is so glad to have had to find her way to the group, even if it was through health issues or she would still be doing damage.  motivation for sobriety; 10 she likes being sober and reports she likes learning and attending group.   wk of 8/7  motivation for sobriety 10.  Motivation is high to have quality of life  wk of 7/31  on likert scale 1-low and 10 -high:  motivation for sobriety: 10   WK of 7/24-motivation 10 on Likert scale.she expressed strong commitment to sobriety, see notes dated 7/25 for more details.  wk of 7/17:  on likert scale 1-low and 10 -high:  motivation for sobriety: 10+  wk of 7/10  My motivation for sobriety is 10/10\".  I think it is so important to attend and be dedicated to support in this group, and to \"say things outloud, so it takes the power away, as you say, Alana\"  wk of 7/3  on likert scale 1-low and 10 -high:  motivation for sobriety 10.  Virginia said Mondays session really stuck with her and she thought about a lot of things and is really understanding why attendance and building on the days and lessons is important.     wk of 6/26  sober support motivation:  2, " "but I think motivation will grow as I keep settling in and I am motivated 10/10 for sobreity.    Wk of :  Client reports motivation for sobriety is 10 (high) Motivation for attending community support 5 (on scale 1 low-10 high).   Virginia expressed to her group how helpful they are to her and how much she appreciates all that members shared about their stories.  She said she gains such helpful insight for self   Week of  Virginia discussed that she has been late a couple times, but realized with 2 other members gone, how this speaks to the importance of being on time and valuing and respecting others time, too.   She will also miss tomorrows group due to a long awaited medical appt. Dim 4: on likert scale 1-low and 10 -high:  Motivation for sobriety:  10  motivation for sober support attendance: not doing yet  Wk of   Virginia also said she finds motivation for sobriety in her family and friends, \"but really deep down need to do this for myself\"  Told counselor and group how she didn't think she would get much out of tx and already is getting so much and is grateful for older adult group   Week of 23: Client reports motivation for sobriety is 10 (high) Motivation for attending community support 5 (on scale 1 low-10 high).  The patient is motivated, to explore treatment.  She said \"of course I am partly here to fulfill requirements for liver transplant, but I want to learn healthy coping for lifelong sobriety.      Dimension 5: Relapse / Continued Problem Potential -   Previous Dimension Ratin  Current Dimension Ratin  Relapses this week - None  Urges to use - None  UA results -   No results found for this or any previous visit (from the past 168 hour(s)).      Using ReSet Clem: N/A    Narrative- wk of 10/30  A trigger may be meeting with my brother in law, at my Mom's birthday,.we have severed ties, but due to it jacky 89th birthday all family will be there.  She has a plan for exiting if needed and " "will be \"staying as far away as possible\"  wk of 10/23   report on cravings, warning signs, triggers: only 1 this week and ironic, it was when I met with the friend who is sober.  \"He kept repeating things and it was exhausting\"  We went over her goals and tx plan, due to moving to  3.   wk of 10/16 cravings, warning signs, triggers: She said she does have some cravings and triggers, but feels confidnet in her tools and support.  \"I above all remember to take a step back, take a breath and think about risk vs rewards of using\"   Wk of 10/9 cravings, warning signs, triggers: \"none this week\" .  She said  \"I have even more acceptance of living a life of sobriety\". Group congratulated her on her movement and acceptance and hard work   wk of 10/2: RR changed 20 1.  She reports having craving of 1 when having dinner with friends. \"I ordered a club soda, and indulged in conversation. She reports cravings at 1 of 10 triggered by watching football with drinking friends. She reports dealing with cravings by getting away from the group for a short time by taking a walk outdoors. Client was able to teach back information for urge surfing and said this is a good tool.  She was congratulated by writer and group peers for having obtained over 9 months of sobriety    wk of 9/25: cravings =1 on likert scale 1-low and 10 -high. Reported dinner with friends as a trigger but reported only minimal cravings. She reports engaging in conversation and ordering a club soda as helpful coping skills.  wk of 9/18  She participated in Psychoeducation/Skills Relapse Prevention (RENEE)  This topic gave a general overview of basic relapse prevention, including definitions of warning signs, triggers and cravings and the importance of addressing healthy coping skills for ongoing relapse prevention.Patient was able to  name 4 of their warning signs and triggers, including:  Too much time with others whom don't like to stop drinking, concerts/shows if " "I don't have a plan  Patient was able to name 4 healthy coping mechanisms for dealing with their warning signs and triggers: including: making a plan of structure  wk of 9/11 Sights, sounds and situations that are triggers : concerts and comedy shows, but support, discussion and a fun NA drink are how I deal with it.   Virginia said each day she is healing more from surgery but is still quite sore.  \"I have had absolutely no cravings or desire to use alcohol\".   Gone week of 9/4 for surgery   wk of 8/28  cravings, warning signs, triggers: none I went to a [place] game and had no cravings.\"   Wk of 8/21/2023 She reports having no cravings to drink this week and having a \"wonderful week.\"       Wk of 8/14   holley discussed a big warning sign for self is thinking I can drink again, especially socially.  II think about  it regularly.  Group discussed that awareness of this is good and it is likely normal to do this.  I encouraged her to continue to discuss this and down the road to keep in mind with sponsor, therapist, etc. And work through it.  I told her it is really good she can say this outloud.   Something I am doing well: having plans of structure, especially when around situations I used to drink in     any cravings, warning signs, triggers: the [place] concert could have been but wasn't .  I have supportive people and that helps, but so does the realization of my use patterns and how I feel better not using  wk of 8/7 Virginia said that a trigger for her was going to the casino for a comedy show. \"I haven't been there since starting tx\"  My  and I just kept walking and he also said \"I got you\".  She said her cravings only lasted a couple minutes.  We discussed how this can be common and some group members said they cannot go to the same places at all because of that kind of trigger.  She said \"I get it, and will continue to be aware of situations like this   wk of 7/31   cravings, warning signs, triggers: None this " "week   WK of 7/24-client denies triggers, cravings or warning signs this past week.  wk of 7/17:  cravings, warning signs, triggers:i went to a musical festival, had a few cravings, but also had insight when watching others use, \"glad it isn't me\"  Wk of 7/10 Had a PeTH test 7/7, see above. No issues. She discussed the Serenity Prayer and that she finds it helpful.  She also discussed having cravings at a 4 of 10 when cooking, and what she found helpful:  Processed, talked with , talk with group today about it.   Virginia said she really had quite a learning week with events in group, discussions and assignments.  \"My heart goes out to the new person the other day, but also I had some anger about all the disruptions and negativity from her\"  \"I am ready to move on, and learn what I learned, and know that could be any one of us\"  wk of 7/5  cravings, warning signs, triggers: none.  My adult kids are caring and asked if I thought I would be OK going to [facility], as there is drinking there.  She told them she appreciates the concern, but feels with them and little to no triggers, for quite some time, she didn't feel it was a problem.  Virginia said she had a wonderful time with her family and is not concerned about use over 4th of July holiday either, she has a good plan for spending time with family and focusing on good food.   wk of 6/26  Dim 5: cravings, warning signs, triggers: none.  Wed reading was focused blame.  It said blaming is hiding, that when we blame others we try to hide our character defects.  We discussed the meaning of character defects. \"I was in big denial for a long time, it took being told I would need a new liver for me to really realize what drinking was doing. \"it hit me like a ton of bricks when I finally really heard it\"  wk of 6/19  She participated in  Psychoeducation/Skills: Self-Care.  Client s completed self-care assessment and described self-care as feeling better physically. " "Client identified physical self-care they do well- attending medical appointments, one they do poorly or needs improvement- eating healthy food and barriers that prevent themselves from self-care - cost of food is expensive, the shopping and prep.  This topic will give a general overview of self-care: physical, environmental, emotional, social and spiritual. It will explore importance of self-care and the impact on recovery.   Wk of . Dim 5: warning signs, triggers and cravings: none   helpful coping: attending group. Examples of benefits of drinking or drug use, or other behaviors Physical, Social mental or emotional: confidence, escape from reality, \"friends\"     Main drawbacks connected to these: not real friends that only want to drink together.  Headaches and slurred speech.  Remoarse  Benefits connected to abstinence from addictive behaviors:more able to do me and be me, I enjoy trud friends, I remember things better and will have better memories      Drawbacks you see connected with abstinence from these:  Wk  she participated in the daily reading and said she liked it.  Virginia also gave meaningful feedback to client who discussed use episode: \"it could be anyone of us\"  She said this is important and she is learning from others sharing.  Week of 23: Client denies riggers or warning signs this past week. She said she liked the saying \"it is just as easy to create good habits as bad ones\"   Patient reports family members and her  are concerned about their substance use.  Patient reports their recovery goals are \"abstinence forever.\"  She has had no previous detoxes or treatments and needs to gain insight into healthy coping for relapse prevention.      Dimension 6: Recovery Environment -   Previous Dimension Ratin  Current Dimension Ratin  Family Involvement - not at this time  Summarize attendance at family groups and family sessions - NA  Family supportive of treatment?  " "Yes    Community support group attendance - no  Recreational activities - some camping, cooking and boating    Narrative - wk of 10/30, she said she met with her friend who has been going to .  My structure includes:  a trip to [place] for Mon's 89th birthday.  Meeting up with friends.  Time in the pool and a self care courtney day\"  Now that I have group 1 day per week, I will also talk with my friend in , Monday.      wk of 10/23  I met with a friend who has been sober 10 years.  wk of 10/16.  I find this group, my  and family, and many friends to be very supportive.  For structure and sober support for the weekend,  Virginia said she will be spending time with her family much of weekend, including a 2 year old bday party.  She said she is going to spend time journaling each day too.   wk of 10/9  5, but I am opening to this more and more\"  She participated in discussion on how to create healthy boundaries and was able to name physical boundaries like our body, personal space, sexual orientation, and privacy.   wk of 10/2  . [place]  participated in discussion of how addiction affects relationships and said: things that are hard are  even more difficult\"   wk of 9/25: sober support motivation: 4 on likert scale 1-low and 10 -high. She denies attending any sober support meetings. Client participated in an interactive educational session and learned about and discussed \"Community Based, Sober Support. this week. Client learned about 14 community based sober support meeting options and shared that she is interested in attending community based sober support meetings, that she only wants to attend the in person meetings not Zoom meetings. She shared after the discussions on the different types of sober support meetings that the one she \"liked the Rajendra and the 12 Steps\" meeting.     wk of 9/18  sober support motivation:  5, but I did make a call to a friend who has been sober 13 years and we are going to meet in a few " "weeks .   we discussed focused on self care over the next days and She intends to rest, babysit her grandkids with her  overnight, see her sister whom she has not seen for 4 months.     wk of 9/11 sober support motivation:  5, but I will look into SMART Recovery. She participated in Psychoeducation/Skills Goal Setting (RENEE)  This topic will give a general overview of short, intermediate and long term goals including the value of goal setting. She completed her goal setting worksheet and discussed things that can get in the way of her goal: others that suck energy out of her, old thinking about how nice a drink would be  Plan of structure for weekend: She said she will go camping with family this weekend in their new RV, she also has to rest a lot.  \"I love football and will watch most any game\"  absent wk o f9/4  wk of 8/28:\" I have a great deal going on with my upcoming surgery, will likely look into sober support when things settle down.  Right now my group is support\"  wk of 8/21 Client worked on her dimension 6 goals this week as she learned and discussed more about sober support. She continues to report a low desire to attend outside community based sober support meetings, however she does appear to be open to learning more about it.     wk of 8/14 [place] discussed that she went to [place] with friends who were drinking, but that they all said they would not need to drink. She said she appreciated that but it really didn't bother her in the least, as she likes being sober. Group discussed this type of situation and some group members said it would have been hard for them. Virginia said if it ever is, she will change what she does or ask them not to use. \"I have very dear friends and I know they mean it when they say the don't need to drink around me.  sober support motivation: 5   8/7 \"I am at 5 on wanting to attend community support, but find group support very helpful!  wk of 7/31  sober support motivation:  I " "have so much going on with medical and this meeting 3x per week, that realistically, I will not likely look into community support until after my surgery at the end of the month.   WK of 7/24-client's support are her family, she is not attending support groups and motivation to attend is 2 on Likert scale. Client shared she has learned this week is this is a continuous journey and staying positive is huge. To support her sobriety this week she will continue to surround herself with good people. She is grateful for home, shelter and stability.  wk of 7/17:  sober support motivation: this group and my family.     wk of 7/10: \"I am motivated for outside support at 2/10, but when I get done with Tuesday group, I think my motivtiaon will be higher\"  wk of 7/5   We discussed \"The power of Solitude\" and how spending time alone with ourselves may not be easy or even desirable, it it is key to getting to know self.  I emphasized that studies tell us we need each other to survive and be happy, but also when we loose the ability to be alone with ourselves, sometimes our overstimulated nervous systems suffer from no place to rest and recharge.  She said this is good to discuss, as she may even consider doing it a bit more, as it \"really does recharge me\" She said she is pretty good about finding alone time and also uses journaling.  I asked client how this applies to recovery and treatment:She said she will often take a topic from group, or a question and journal about.  She said it really helps   Wk of 6/26. She said sober support motivation is  2, but I think motivation will grow as I keep settling in  I talked with clients about taking responsibility for our actions, so we can be empowered and own our life and also change if we need to. We discussed resources including AA, SMART Recovery.  I encouraged clients to start exploring meetings.  Wk of 6/19.  Virginia reports with having group 3x per week, it is a good deal of support, " "\" I will continue to learn about peer support\"  wk of 6/12client discussed resources sober support, including AA, Liver Transplant support and [place] recovery connection  wk of 6/6  goals for the wknd: look into volunteering at [facility] for writing card.  \"no community support attendance at this time\"   Client is not currently attending community sup[ort and does not have a sponsor. Client's supportive people are her family. Client shared she has friends and has not talked with many about her current situation, because \"I am a private person,\" (treatment and needing transplant).  Patient reports they are involved in community of holden activities    They reports spirituality impacts recovery in the following ways:  \"There was some changes as far as .  We do belong to a Sikhism but it's been a number of years since we've been.\"  Patient believes her spirituality and sobriety are connected. Patient reported having 3 children, all adults.  Patient has 2 grandchildren.    She lives with her spouse and son. recreational/leisure activities: camping, cooking, boating, playing board games and card games.  Patient is currently disabled due to her Crohn's Disease.  Patient reports their income is obtained through SSDI disability; spouse.  Patient does not identify finances as a current stressor.         Progress made on transition planning goals: just began tx    Justification for Continued Treatment at this Level of Care:   She reports she wants sobriety for her life.  Needs to continue to learn long term relapse prevention coping skills, she reports finding that attending group is helpful for her and she is learning but she is feeling close to ready to be completed and to attend 1x per week for Ph 3.  She said she did not think she would get much out of this but is getting so much support and understanding of healthy coping.  She is not attending community support, at this time, \"but I find my group so helpful and now met with a " sober friend and will attend the meetings he goes to.  She is working on goals and assignments.    Virginia continues to discuss gratitude for sobriety and meeting her group members.    Treatment coordination activities this week: wk of 9/25 staffed with SOLANGE Ponce. wk 9/18 Celia,  on LT asked her time frame to be done, since Virginia just had surgery and there is no issue on the horizon about getting a new liver   wk 8/28 Reviewed notes from sub Galen Rowland  7/31 reviewed Onelia Singh notes and discussed client with her.  7/18  Discussed client info with margaret Singh   Discussed client info with margaret Singh  Need for peer recovery support referral? No    Discharge Planning: Not at this time      Has vulnerable adult status change? No    Interdisciplinary Clinical Supervision including:  no    Are Treatment Plan goals/objectives effective? Yes  *If no, list changes to treatment plan:    Are the current goals meeting client's needs? Yes  *If no, list the changes to treatment plan.  Plan:  Continue per Master Treatment Plan    *Client agrees with any changes to the treatment plan: Yes  *Client received copy of changes: Yes  *Client is aware of right to access a treatment plan review: Yes     Staff Members Contributing To Weekly Review:      Judi Dodd, MS, [id number], LPC

## 2023-11-01 NOTE — GROUP NOTE
"Psychoeducation Group Documentation    PATIENT'S NAME: Abiola Matute  MRN:   [id number]  :   1965  ACCT. NUMBER: [id number]  DATE OF SERVICE: 23  START TIME: 12:00 PM  END TIME:  2:00 PM  FACILITATOR(S): Judi Dodd LADC  TOPIC: BEH Pyschoeducation  Number of patients attending the group:  5  Group Length:  2 Hours    Skills Group Therapy Type: OP Group    Summary of Group / Topics Discussed:    Mindfulness, Structure, Feedback,         Attestation:   Dr. Rj MD - Provides oversight and supervision of care.     Today group, individually completed TAINA-2, PHQ-2, PROMIS and counselor entered in doc flow.  We checked in on anything clients wanted to review or discuss about Monday group.  This counselor reviewed feedback, speaking from the \"I\", and being careful of cross talk.  I asked clients to be more aware of pausing when giving or receiving feedback, so be more mindful. I also reviewed levels of care with clients, as they seemed unaware of this.  I discussed such things as Detox, Hospital care, Lodging care, IOP, OP, Co-occurring.  Clients shared assignments given last week on \"Noticing\".  I broke clients into 1 group of 3 and 1 group of 2 to share these and also to practice giving and receiving, while being mindful, from discussion above.  When done I asked clients how that was to be practicing, as the giver and  of information.      Psychoeducation  Topic: Mindfulness Skills     This topic will help clients gain insight and understanding into the importance of paying attention to the present moment and learning skills to help clients practice being more mindful in their everyday lives.  It will compare mindfulness and mindlessness      Objective(s):      Clients will understand  the importance of staying open minded to the every changing world.   Clients will identify the connection between mindlessness and continued use   Clients will learn how mindfulness can help for " "healthy coping    Structure (modalities, homework, worksheets, etc):   Receive a handout on mindfulness vs mindlessness, discuss   Group discussions, sharing personal experiences and feedback to fellow peers   Identify at least one way clients can practice being more mindful.      Expected therapeutic outcome(s):    Client  will:   Understand how practicing mindlessness leads to majority of our suffering   Be able to increase mindfulness skills in their daily life.      Therapeutic outcome(s) measured by:      Clients ability to teach back information, actively participate in group discussions, and participate in follow-up discussion to see if they can identify ways their practiced being more mindful.         Group Attendance:  Attended group session  Patient's response to the group topic/interactions:  cooperative with task, discussed personal experience with topic, and listened actively  Patient appeared to be Actively participating and Attentive.        Clients specifice information :  Today's session focused on check in of all dimensions, as Virginia joined Ph 2 today, and did not check in this week. She is the only person in  3 and wanted to join group, rather than just do an individual session.  She did do her assignment:  and checked in on dimensions.    Today's group session focused on feedback, group structure and dim 3 mindfulness vs mindlessness and Healthy coping for anxiety .  She said her anxiety has gone way down but reading this article and having 10 things from the list she will use as regulars is helpful.  She said she is learning how boundary setting is helping with unneeded anxiety in some instances.   .  Client completed PHQ-2, TAINA-2, PROMISE.  It also focused on structure over the weekend:  My structure includes:  a trip to [place] for Mon's 89th birthday.  Meeting up with friends.  Time in the pool and a self care courtney day\"  Now that I have group 1 day per week, I will also talk with my friend in " "[place], Monday.    She shared her noticing assignment with another group member and they appeared to have good discussion back and forth.  \"Personally I really liked breaking into smaller group for this assignment, as we could talk longer about it\"     Client received handout on mindfulness and mindlessness and participated in discussion:  This all looks good, but hard.  I like how this is layed out. I like the ideas on here.  I reminded clients to pull the handout out each day, even.  I said it is not about looking at it and judging if you did or didn't do something, it is about using it for awareness to bring you to the present moment.  It is about empowering self to be fully present with what is.    P) Use mindful handout.  Keep journaling .  Use healthy coping for anxiety     Judi Dodd, MS, LADC, LPC    Attestation:  Dr. Rj CHEN - Provides oversight and supervision of care.       "

## 2023-11-08 ENCOUNTER — HOSPITAL ENCOUNTER (OUTPATIENT)
Dept: BEHAVIORAL HEALTH | Facility: CLINIC | Age: 58
Discharge: HOME | End: 2023-11-08
Attending: FAMILY MEDICINE
Payer: COMMERCIAL

## 2023-11-08 ENCOUNTER — DOCUMENTATION ONLY (OUTPATIENT)
Dept: TRANSPLANT | Facility: CLINIC | Age: 58
End: 2023-11-08
Payer: MEDICARE

## 2023-11-08 ENCOUNTER — TELEPHONE (OUTPATIENT)
Dept: BEHAVIORAL HEALTH | Facility: CLINIC | Age: 58
End: 2023-11-08
Payer: MEDICARE

## 2023-11-08 PROCEDURE — H2035 A/D TX PROGRAM, PER HOUR: HCPCS | Mod: HQ

## 2023-11-08 NOTE — TELEPHONE ENCOUNTER
----- Message from Zaid Hurley sent at 11/8/2023  3:15 PM CST -----  Regarding: FW: individual session    ----- Message -----  From: Judi Dodd LADC  Sent: 11/8/2023   3:08 PM CST  To: [facility]  Subject: individual session                                 Scheduling Request  individual session    Patient Name:  STEVEN MCNAMARA [[id number]]  Location of programming: [place]  Number of visits to be scheduled: 1  Duration of Appointment in minutes: 60 minutes    In person  Billing Type: part of program    Group: [id number] on Monday, Tuesday, and Wednesday, at 12:00 pm: PM to 2:00 pm  Individual Appt (SI) Date:   11/15    Time: 11:15     Attending Provider (MD) Dr. De La Rosa      Thank You,  Alana Dodd M.S., [facility], LPC  Lead Counselor  [phone]

## 2023-11-08 NOTE — PROGRESS NOTES
[facility] Weekly Treatment Plan Review      Attestation:   Dr. De La Rosa - Medical Director - Provides oversight and supervision of care.        Date Span: Monday: 23 through 23     Date     Group Therapy  PH 3 0 hours  completed Completed tues skills  2 hours ph 3 0 hours 0 hours       Individual Therapy                Family Therapy                 Psychoeducation                 Other (Specify)                   Client had no absences this week     Wk o f  Virginia is excused this week due to holiday and to recovering from her surgery last week.  All notes below are based on last attendance and will be updated when client returns next week    Clients individual sessions noted below. Client had 1 excused absence this week, due to having surgery.    Client has no absences this week    Client has one excused absence this week , due to medical appts.  Client excused from group , due to medical appointment.     Program Running Totals: (No Phase 1 IOP due to this program being only OP level of care)  Total # of Phase 2 OP Group Sessions: 41 for 82  Virginia gets 60 sessions for 120 hours at this time and has 47    Total # of Phase 3 OP Recovery Management Group Sessions: 2 for 4 hours    Total # of 1:1 Sessions:  1 hour BALWINDER   1 hour 5/30  7/3, , , 10/23                                    Darinel , , ( & -Onelia)                    Weekly Treatment Plan Review     Treatment Plan initiated on: .    Dimension1: Acute Intoxication/Withdrawal Potential -   Previous Dimension Ratin  Current Dimension Ratin  Date of Last Use 22  Any reports of withdrawal symptoms - No  wk of : Client reports no change in sobriety date and exhibits no sx of intoxication or withdrawal.      Dimension 2: Biomedical Conditions & Complications -   Previous Dimension Ratin  Current Dimension Ratin    Medical  "Concerns: wk of 11/2, \"no concerns\"  wk of 10/30: \"no concerns, doing well.  RR changed from 2 to 1.  She continues to meet with her care team as needed but after her surgery for cancer she is doing well and \"no immediate issues with liver\"  wk of 10/16, \"doing very well\"   wk of 10/9 \"still healing from surgery, but doing very well now\"  wk of 10/2 \"still healing from surgery, but good\"  wk of 9/25: Client denies any new or worsening medical issues.  wk of 9/18  Virginia discussed that she was not aware before this surgery that chances are high she will still need a transplant someday.  She said she went through a range of emotions, as she was not prepared for this. \"I had to go through what I did, and now feel better about keeping my life on the positive and 'I will deal with that bridge when I get to it\".  Group expressed empathy for all she has been through and also her positive attitude of gratitude.    I told her Celia,  on LT asked her time frame to be done, since Virginia just had surgery and there is no issue on the horizon about getting a new liver, I wanted to make sure I was understanding so as to know if Virginia and LT are good with normal schedule, which would have her completing about the end of November. Virginia met with Dr. Saavedra today and he said as far as anyone could ever see there is no thought or idea she will have a transplant.  Sometimes a person needs a new liver years from now, but things are looking good at this time.  Virginia said she has nothing special she wants to work on \"I keep learning from all that we do and I am sure building bonds with the others\"     wk of 9/11 \"no concerns, continuing to recovery from surgery\"  Surgery 8/29 for her cancer  /821/2023 Reports having no new or worsening health conditions. Reports having an appointment this week on 8/23/2023 with her Cardiologist.     wk of 8/14 No concerns I did  my pre op eval for my upcoming surgery with Dr. Bear and nurse Sakina " "Quan  wk of 8/7 No new concerns  wk of 7/31:  no, just good news that they may be able to remove all my tumore and I may not need a liver.  WK of 7/24-Client has an appointment with surgeon regarding increase in tumor. She is excused from programming due time conflict.  wk of 7/17 She had appt with radiology on 7/14, continues to work with her team due to LT, cancer and rash.  Virginia told group she is dealing with some news, medically, but working with her Drs.  She said she isn't wanting to share a whole lot, until she gethers her information, but she did want group to know she has support and appreciates just being able to share what she did  wk 7/10Jasmyn reports rash and working with , also had CT and MRI related to ongoing care for cancer and need for liver.  wk of 7/3  I get an infusion 1x per month and usually by week 3, I feel more sore and more tired, but I am grateful for the infusions.  \"no concerns beyond ongoing liver cancer and Crohns\"  wk of 6/12 She saw Jazmin Barrios at Urgent care for skin issues.  She also said she met with her GI practioner  She reports having liver cancer and Crohn's disease.  \"right now I am doing pretty well  Outside medical appointments this week (list provider and reason for visit)on 9/15 medical appt with Jeannette Cervantes gastroenterologist   8/29 Surgery with care team for cancer  wk of 8/14 No concerns I have my pre op eval with Dr. Glass, for my upcoming surgery   Wk of 8/7 No appts this week   wk of 7/31: no appts.  wk of 7/24 she saw surgeon Junior Bonilla, she is dealing with her liver tumor  wk of 7/17 She had appt with radiology, Dr. Shah,on 7/14, continues to work with her team due to LT, cancer and rash  : wk of 7/6 she saw Susanna RAZA for rash on leg.  Had a PeTH test 7/7, see below. No issues  : 6/13 she saw Jazmin Meng PA-C for a flare up of her psoriasis likely related to her other medical issues, per client     Current Medications & Medication " Changes:  Current Outpatient Medications   Medication    ammonium lactate (AMLACTIN) 12 % external cream    clobetasol (TEMOVATE) 0.05 % external cream    furosemide (LASIX) 40 MG tablet    inFLIXimab (REMICADE) 100 MG injection    Multiple Vitamins-Minerals (MULTIVITAMIN & MINERAL PO)    senna-docusate (SENOKOT-S/PERICOLACE) 8.6-50 MG tablet    spironolactone (ALDACTONE) 100 MG tablet    triamcinolone (KENALOG) 0.1 % external cream    UNABLE TO FIND     No current facility-administered medications for this encounter.     Facility-Administered Medications Ordered in Other Encounters   Medication    BREEZA Lemon-Lime flavored oral liquid for Neutral Abdominal/Pelvic Imaging 1,000 mL    hyoscyamine (LEVSIN/SL) sublingual tablet 125 mcg     Medication Prescriber:  Linda Macdonald  Taking meds as prescribed? Yes  Medication side effects or concerns:  no        Dimension 3: Emotional/Behavioral Conditions & Complications -   Previous Dimension Ratin  Current Dimension Ratin  PHQ2:       2023     1:00 PM 10/25/2023     3:00 PM 10/4/2023     4:00 PM 2023     2:00 PM 9/15/2023     9:12 AM 2023     5:00 PM 2023     8:00 AM   PHQ-2 (  [employer])   Q1: Little interest or pleasure in doing things 0 0 0 0 0 0 0    0   Q2: Feeling down, depressed or hopeless 0 0 0 0 0 0 0    0   PHQ-2 Score 0 0 0 0 0 0 0    0   Q1: Little interest or pleasure in doing things       Not at all   Q2: Feeling down, depressed or hopeless       Not at all   PHQ-2 Score       0      GAD2:       2023    10:00 AM 2023     8:00 AM 2023     5:00 PM 2023     2:00 PM 10/4/2023     4:00 PM 10/25/2023     3:00 PM 2023     1:00 PM   TAINA-2   Feeling nervous, anxious, or on edge 1 0    0    0    0    0 1 0 1 1 1   Not being able to stop or control worrying 0 0    0    0    0    0 0 0 1 0 0   TAINA-2 Total Score 1 0    0    0    0    0 1 0 2 1 1     PROMIS 10-Global Health (all questions and answers displayed):        5/25/2023    10:00 AM 7/19/2023     8:02 AM 8/16/2023     5:00 PM 9/20/2023     2:00 PM 10/4/2023     4:00 PM 10/25/2023     3:00 PM 11/1/2023     1:00 PM   PROMIS 10   In general, would you say your health is:  Good        In general, would you say your quality of life is:  Very good        In general, how would you rate your physical health?  Fair        In general, how would you rate your mental health, including your mood and your ability to think?  Very good        In general, how would you rate your satisfaction with your social activities and relationships?  Excellent        In general, please rate how well you carry out your usual social activities and roles  Very good        To what extent are you able to carry out your everyday physical activities such as walking, climbing stairs, carrying groceries, or moving a chair?  Mostly        In the past 7 days, how often have you been bothered by emotional problems such as feeling anxious, depressed, or irritable?  Rarely        In the past 7 days, how would you rate your fatigue on average?  Mild        In the past 7 days, how would you rate your pain on average, where 0 means no pain, and 10 means worst imaginable pain?  3        In general, would you say your health is: 3 3    3    3    3    3 3 4 4 4 4   In general, would you say your quality of life is: 4 4    4    4    4    4 3 4 4 4 4   In general, how would you rate your physical health? 3 2    2    2    2    2 3 3 3 4 3   In general, how would you rate your mental health, including your mood and your ability to think? 4 4    4    4    4    4 4 5 4 4 3   In general, how would you rate your satisfaction with your social activities and relationships? 5 5    5    5    5    5 4 5 4 4 4   In general, please rate how well you carry out your usual social activities and roles. (This includes activities at home, at work and in your community, and responsibilities as a parent, child, spouse, employee, friend,  etc.) 5 4    4    4    4    4 4 4 4 4 4   To what extent are you able to carry out your everyday physical activities such as walking, climbing stairs, carrying groceries, or moving a chair? 4 4    4    4    4    4 3 4 3 4 4   In the past 7 days, how often have you been bothered by emotional problems such as feeling anxious, depressed, or irritable? 2 2    2    2    2    2 2 2 1 2 2   In the past 7 days, how would you rate your fatigue on average? 2 2    2    2    2    2 2 2 1 1 3   In the past 7 days, how would you rate your pain on average, where 0 means no pain, and 10 means worst imaginable pain? 4 3    3    3    3    3 2 3 2 1 1   Global Mental Health Score 17 17    17    17    17    17 15 18 17 16 15   Global Physical Health Score 14 14    14    14    14    14 14 15 15 17 14   PROMIS TOTAL - SUBSCORES 31 31    31    31    31    31 29 33 32 33 29     Mental health diagnosis none  Date of last SIB:  never  Date of  last SI:  never  Date of last HI: never  Behavioral Targets:  see tx plan  Risk factors:  Client dealing with liver disease/cancer and Crohns, client needs a new liver,   Protective factors:  spirituality, forward/future oriented thinking, safe and stable environment, regular physical activity, living with other people and structured day  Current MH Assignments:  see tx plan      Narrative:wk of 11/6, I am doing well and feeling little to no stress, anxiety or depression.  She participated in meditation 11/8  wk of 10/30  Client completed PHQ-2, TAINA-2, PROMISE.  It also focused on structure over the weekend:  My structure includes:  a trip to [place] for Mon's 90.    She participated in mindfulness vs mindlessness.[place]ia received handout on mindfulness and mindlessness and participated in discussion:  This all looks good, but hard.  I like how this is layed out. I like the ideas on here.  I reminded clients to pull the handout out each day, even.  I said it is not about looking at it and judging if you did  "or didn't do something, it is about using it for awareness to bring you to the present moment.  It is about empowering self to be fully present with what is.  and way down but reading this article and having 10 things from the list she will use as regulars is helpful.  She said she is learning how boundary setting is helping with unneeded anxiety in some instances. She also completed  Healthy coping for anxiety .  She said her anxiety has gone way down but reading this article and having 10 things from the list she will use as regulars is helpful.  She said she is learning how boundary setting is helping with unneeded anxiety in some instances  .  Client completed PHQ-2, TAINA-2, PROMISE.  It also focused on stru   wk of 10/23.  She attended individual session with this counselor and  completed PhQ-2, DAD-2 and PROMIS.  we discussed her Consequences of Use assignment.  She named her health as the biggest consequence but also her family was worried, and she did not know a lot of this until close to starting tx.  She said another consequence \"I am much more clear, not \"hangover or foggy brain\" She said the discussions on radical acceptance are helping her grow her awareness of loving self better, and how this and recovery can work together.  \"no thoughts of self harm or concerns\"  on likert scale 1-low and 10 -high:   Anxiety: 0  stress: 1  depression 0      issues to discuss and helpful coping: this group, assignments.   Virginia was able to teach back the techniques discussed on disputing negative thoughts from the positive psychology information.  She said it was helpful   wk of 10/16 She participated in discussion on radical acceptance and also gave and received feedback well from group today. \"I appreciate feedback\"   3: \"no thoughts of self harm or concerns\"  on likert scale 1-low and 10 -high:   Anxiety: 1  stress: 1  depression 0      issues to discuss and helpful coping: this group, assignments, talking with my " ". She participated in psychoeducation on  Distress Tolerance-Radical Acceptance   This topic will focus on skills designed to keep our pain from turning into unnecessary suffering.  Virginia received handout and participated in discussion on various strategies to effectively manage distressing situations and said this was a helpful discussion.  Client was able to teach back what radical acceptance means and said \"this is good stuff\"    wk of 10/9   \"no thoughts of self harm or concerns\"  on likert scale 1-low and 10 -high:   Anxiety: 0  stress: 1  depression 0      issues to discuss and helpful coping: meeting and talking with others  wk of 10/2 She completed PHQ-2, TAINA-2, and PROMISE and she expressed no need for further discussion at this time.  Client took part in a discussion of trauma as associated with addiction and was introduced to the idea of radical acceptance. She participated in a \"[place]\" guided visualization which she described as helpful.   wk of 9/25: no thoughts of self harm. On likert scale 1-low and 10 -high:   Anxiety: 0; depression: 0; stress: 0   wk of 9/18 Today I had Virginia answer 3 questions before doing the daily reading focused on acceptance, as it related to self and to what life presents. We had a long discussion on This.   \"no thoughts of self harm or concerns\"  on likert scale 1-low and 10 -high:   Anxiety: 1  stress: 1  depression 1  issues to discuss and helpful coping: talking with my care team, using breath work. Talking to my .   She said she has not felt like eating much, but the positive of it is she has lost 10 pounds.  Virginia  completed PROMIS, scoring 33. TAINA-2, scoring 0, and PHQ-2, scoring 0.  She said she sometimes has worries in her life, but overall feels blessed to not have a lot of anxiety or depression.   wk of 9/11  : \"no thoughts of self harm or concerns\"  on likert scale 1-low and 10 -high:   Anxiety:  2  stress: 1 depression 0  issues to discuss and " "helpful coping:  right now resting  wk of 8/28, RR changed to 0 from 1.  Virginia continues to report healthy coping for anxiety symptoms.  She remains optimistic and finds using tools learned are helpful, as is discussion with supportive people.  : \"no thoughts of self harm or concerns\"  on likert scale 1-low and 10 -high:   Anxiety:  7  stress: 1 depression 2     Virginia will be having her surgery tomorrow.  She discussed \"anxiety mireya high, yet I am hopeful\"  She said she has done all she can with her health and not drinking and preparing herself mentally.  She said she was glad she had group to attend and gets \"immense support\"  wk of 8/21/2023 She reports her anxiety is down and she experiencing no stress or depression. Client worked on her Dimension Three goals this week as she learned about and discussed the topics of Shame, Stigma and Mindfulness.     wkof 8/14  Ways I can think differently: listen to my critical voice and change my thoughts  Ways I can do things differently:  talk to myself like I would a friend.  \"no thoughts of self harm or concerns\"  on likert scale 1-low and 10 -high:   Anxiety:  0  stress: 0 depression 0  issues to discuss and helpful coping:  doing fun things, attending this group, being sober  wk of 8/7 ratings on likert scale 1-low and 10 -high: Anxiety: 2 stress: 1 depression 2. She participated in discussion on \"change the channel\" when getting into an anxiety loop. \"It is a simple way to do something more productive. Virginia actively participated in discussion on Thought distortions and discussed which ones are the ones she actively wants to focus on for the time being: catastrophising and prediction.  She discussed  two situations where the techniques could be helpful, including: Should's and musts and not setting up unrealistic expectations of how she or life should be.  I want to remind myself \"until someone has walked a mile in my shoes, they don't get to  me and the same goes for " "me with them.  Virginia actively participated in meditation and said it was good to take that time to be.  \"I liked it\"  wk of 7/31  she participated in psychoeducation on Understanding my anxiety   This topic helped client to gain awareness of thoughts and feelings associated with anxiety and act with awareness when feeling anxiety and use helpful coping.  Client did actively participate in group discussions, and was able  to identify anxious thoughts, physical feelings when having anxiety: racing heart, shaking, confusion  \"why me\"  She was able to  identify 2 helpful coping methods: breath, talk with her  who is a comfort, stop the story telling/negativity when aware.  Virginia discussed that her tumor is shrinking and she is \"cautiously optimistic\". She said she wanted to share that she has many feelings associated with this, \"it even could mean I won't need a LT, but that remains to be seen\"  I told Virginia she has been shown how with our spirituality and belief, sometimes things can happen that were never anticipated.  She said \"so true\" I never had an idea that was even a part of my hope\"      Wk of 7/24- client denies thoughts of self-harm. On Likert scall 1-low and 10-high client rates anxiety 1  depression 0 and stress 2. She shared her new diagnosis of tumor growth has impacts stress and anxiety.   wk of 7/17: no thoughts of self harm or concerns\" on likert scale 1-low and 10 -high:   Anxiety:  5  stress: 5 depression 1  issues to discuss and Helpful coping:  All related to my medical, but I have great medical team and family support.  Client said of the reading:I like the ongoing discussions of shame and this reading brought me awareness that I have some to deal with regarding the nature of why many get liver cancer.       wk of 7/10:  She was able to repeat the steps of 5 senses, as a review.   \"no thoughts of self harm or concerns\".  Clients feedback to group member  on likert scale 1-low and 10 -high:   " "Anxiety:  2  stress: 2  depression 2  issues to discuss and Helpful coping: talking with you guys.  I had a craving that was pretty big this week.  Psychoeducation/Skills The thinking-feeling connection. This topic will address information from the [place] for Clinical Interventions about how our thoughts influence our feelings.  Client was able to define automatic thoughts and make a connection with some that she has.   She said that she has been continueing to gain awareness of her beliefs vs perceptions vs what is a thought and what is a feeling.  Discussed that reminding herself it is ok to have thoughts and feelings, but they are not facts for other people.    Client participated in lovingkindness meditation and said it was just what she needed today and it that it was helpful and grounding.  \"Thank you for doing it\"    wk of 7/3: Clients participated in 5 senses psychoeducation discussion, participated in guided practice and was able to name 5 parts, and said she feels it will be a helpful tool  \"no thoughts of self harm or concerns\".   I discussed the research on gratitude journals and expressed she has seemed like she has an \"attitude of gratitude\".  She said she feels blessed and hopes she does.  on likert scale 1-low and 10 -high:   Anxiety:  0  stress: 0 depression 0  issues to discuss and Helpful coping: I am learning to say no, and that boundaries are good    wk of 6/26  Attended MH skills group with Darinel Au.    She participated in psychoeducation on Core beliefsThis topic will discuss core beliefs, as a lens through which we see life and how that affects our choices.  Virginia said the discussion on core beliefs is something she will spend some time on, as the awareness is helpful.  I told her we will follow up on Wednesday with more that can be helpful when we notice harmful core beliefs.   \"no thoughts of self harm or concerns\"  on likert scale 1-low and 10 -high:   Anxiety:  1 stress: 1 " "depression 0  issues to discuss and Helpful coping: learning about self, anxiety and stress  wk of 6/19 \"no thoughts of self harm or no concerns\".  She reports on likert scale 1-low and 10 -high:   Anxiety:  1  stress: 1 depression 0  issues to discuss and Helpful coping: getting to know people better in group, hearing other's helpful coping.  Attended MH skills group with Darinel Au.  We discussed anticipating some situations and \"gearing up\" by practicing deep breathing. I reminded client of previous discussions where beliefs often guide how we feel about certain situations, but that we can reframe them, or look at them for the particular situation.      Wk of 6/12  I am feeling much better and less anxiety about group attendance as I continue to attend and get to know others. \"no thoughts of self harm or concerns\". On likert scale 1-low and 10 -high:   Anxiety:  1  stress: 1 depression 1  issues to discuss and Helpful coping:  Breathing, getting to others in group  Wk of 6/6 \"no thoughts of self harm\"  Attended MH skills group with Darinel Angely  Week of 5/31/23 :Client reports feeling anxious due to first treatment experience, not knowing what to expect, her  Mother's declining health-needing a placement for higher level of care, spouse recently lost his job-need to obtain COBRA insurance and unable to care for grandchildren because of attending treatment. Client was saw Traci Aguilar and has not seen them in about 6 weeks. To follow up with primary counselor re: seeing psychotherapist or return to [facility].  Client rates the following on a scale of 1 (low) to 10 (high):  Anxiety- 5  Depression -1  Stress- 5 as a result of above concerns.   Client participated in psychotherapy/education on self-care, introduction to relaxation meditation-tensing and relaxing muscles, and sleep hygiene.  Current Mental Health symptoms include: . Active interventions to stabilize mental health symptoms this " "week :   At SI her PHQ-2 score was 0 and his TAINA-2 score was 1.  PROMISE 10 was  31:  She reports that at this time she is dealing with her mother's living situation and health issues. She reports that her father has passed away and that she is estranged from 1 sister.         Dimension 4: Treatment Acceptance / Resistance -   Previous Dimension Ratin  Current Dimension Ratin  RENEE Diagnosis:  Alcohol Use Disorder   303.90 (F10.20) Severe In a controlled environment  Commitment to tx process/Stage of change- contemplation  RENEE assignments - see tx plan      Narrative - RR changed to 0 She reports 10/10 on motivation for ongoign sobriety.   What I want for myself and sobriety: increased self worth,    what I am willing to do to get it: self reflect, listen, not drink  what I am willit to do with my treatment: complete it, be honest and open minded   What I like about treatment and group:   honesty, safety  What I want in the future from group: stay in touch with members, use humor and seriousness    what I am willing to give to group members is:  humor, feedback, non judgement  and on scale of 1-10, how much effort and time have I put into my recovery:10  wk of 10/30  RR changed to 0.  She is preparing to complete tx and reports wanting ongoing sobriety.  wk of 10/23.  Virginia and LUIS ENRIQUE did an individual session to discuss Ph 3 and we went over a couple individual assignments.  Client said of powerlessness and unmanagability:  \"I do think I am powerless over alcohol\"   I also think I have power in choosing not to drink.  wk of 10/16    10 on  motivation for sobriety   Virginia discussed feeling ready to begin Ph 3 pretty soon. We will meet next Monday to discuss plan.  She also said she has learned about patience for self and others.  She said some things take time.  \"I am better able to slow down, hear others, listen to myself\"   10/9 Virginia said \"I am your disease\" made her tear up and it is awakening\"  she said she is " "so much more accepting of her disease and not drinking, but if sh e wasn't this would sure be a motivator\"  wk of 10/9  she participated in psychoeducation on Readiness to change (stages of change).  This topic gave a general overview of stage of change models and how they apply to the personal experience of each patient.  She participated in discussion, received handout on stages and also listed which stage she was in with: Tx: action   sobriety/abstinence:  action    sober support: determination      community support:  contemplation     my individual goal: action  \"I am your disease\" and she said \"it is so powerful\"       wk of 10/2 [place] expressed understanding of limits of discussion in group, including politics and Hinduism. Counselor emphasized that  beliefs are ok, just sometimes how one express them might be unfair or sound judgemental. She said she is motivated for sobriety 10/10  wk of 9/25: motivation for sobriety =10 on likert scale 1-low and 10 -high. She reports accepting that she is an alcoholic.  wk of 9/18  on likert scale 1-low and 10 -high: 10 on motivation for sobriety. She   wk of 8/28  on likert scale 1-low and 10 -high: 10   motivation for sobriety 10.  Motivated to continue a life of sobriety, \"I am learning so much about self.  wk of 8/21/2023 Client expressed this week about her alcoholism that that she liikes that \"I'm getting better not as shameful.\" That for her it has helped to accept alcoholism, \"occurs as a disease.\"     wk of 8/14  Virginia told group \"I know I am an alcoholic\" she said she is so glad to have had to find her way to the group, even if it was through health issues or she would still be doing damage.  motivation for sobriety; 10 she likes being sober and reports she likes learning and attending group.   wk of 8/7  motivation for sobriety 10.  Motivation is high to have quality of life  wk of 7/31  on likert scale 1-low and 10 -high:  motivation for sobriety: 10   WK of " "7/24-motivation 10 on Likert scale.she expressed strong commitment to sobriety, see notes dated 7/25 for more details.  wk of 7/17:  on likert scale 1-low and 10 -high:  motivation for sobriety: 10+  wk of 7/10  My motivation for sobriety is 10/10\".  I think it is so important to attend and be dedicated to support in this group, and to \"say things outloud, so it takes the power away, as you say, Alana\"  wk of 7/3  on likert scale 1-low and 10 -high:  motivation for sobriety 10.  Virginia said Mondays session really stuck with her and she thought about a lot of things and is really understanding why attendance and building on the days and lessons is important.     wk of 6/26  sober support motivation:  2, but I think motivation will grow as I keep settling in and I am motivated 10/10 for sobreity.    Wk of 6/19:  Client reports motivation for sobriety is 10 (high) Motivation for attending community support 5 (on scale 1 low-10 high).   Virginia expressed to her group how helpful they are to her and how much she appreciates all that members shared about their stories.  She said she gains such helpful insight for self   Week of 6/12 Virginia discussed that she has been late a couple times, but realized with 2 other members gone, how this speaks to the importance of being on time and valuing and respecting others time, too.   She will also miss tomorrows group due to a long awaited medical appt. Dim 4: on likert scale 1-low and 10 -high:  Motivation for sobriety:  10  motivation for sober support attendance: not doing yet  Wk of 6/6  Virginia also said she finds motivation for sobriety in her family and friends, \"but really deep down need to do this for myself\"  Told counselor and group how she didn't think she would get much out of tx and already is getting so much and is grateful for older adult group   Week of 5/29/23: Client reports motivation for sobriety is 10 (high) Motivation for attending community support 5 (on scale 1 " "low-10 high).  The patient is motivated, to explore treatment.  She said \"of course I am partly here to fulfill requirements for liver transplant, but I want to learn healthy coping for lifelong sobriety.      Dimension 5: Relapse / Continued Problem Potential -   Previous Dimension Ratin  Current Dimension Ratin  Relapses this week - None  Urges to use - None  UA results -   No results found for this or any previous visit (from the past 168 hour(s)).      Using ReSet Clem: N/A    Narrative-wk of    She did do her assignment:  \"What My Relapse Looks Like\": She said \"it was such a tough assignment, but I get why you have us do it\" She told group she was in tears looking at the reality of relapse, but it even gave her more motivation to continue on the path for sobriety\"  Group told her how powerful her sharing this was.    wk of 10/30  A trigger may be meeting with my brother in law, at my Mom's birthday,.we have severed ties, but due to it jacky 90 birthday all family will be there.  She has a plan for exiting if needed and will be \"staying as far away as possible\"  wk of 10/23   report on cravings, warning signs, triggers: only 1 this week and ironic, it was when I met with the friend who is sober.  \"He kept repeating things and it was exhausting\"  We went over her goals and tx plan, due to moving to Ph 3.   wk of 10/16 cravings, warning signs, triggers: She said she does have some cravings and triggers, but feels confidnet in her tools and support.  \"I above all remember to take a step back, take a breath and think about risk vs rewards of using\"   Wk of 10/9 cravings, warning signs, triggers: \"none this week\" .  She said  \"I have even more acceptance of living a life of sobriety\". Group congratulated her on her movement and acceptance and hard work   wk of 10/2: RR changed 20 1.  She reports having craving of 1 when having dinner with friends. \"I ordered a club soda, and indulged in conversation. " "She reports cravings at 1 of 10 triggered by watching football with drinking friends. She reports dealing with cravings by getting away from the group for a short time by taking a walk outdoors. Client was able to teach back information for urge surfing and said this is a good tool.  She was congratulated by writer and group peers for having obtained over 9 months of sobriety    wk of 9/25: cravings =1 on likert scale 1-low and 10 -high. Reported dinner with friends as a trigger but reported only minimal cravings. She reports engaging in conversation and ordering a club soda as helpful coping skills.  wk of 9/18  She participated in Psychoeducation/Skills Relapse Prevention (RENEE)  This topic gave a general overview of basic relapse prevention, including definitions of warning signs, triggers and cravings and the importance of addressing healthy coping skills for ongoing relapse prevention.Patient was able to  name 4 of their warning signs and triggers, including:  Too much time with others whom don't like to stop drinking, concerts/shows if I don't have a plan  Patient was able to name 4 healthy coping mechanisms for dealing with their warning signs and triggers: including: making a plan of structure  wk of 9/11 Sights, sounds and situations that are triggers : concerts and comedy shows, but support, discussion and a fun NA drink are how I deal with it.   Virginia said each day she is healing more from surgery but is still quite sore.  \"I have had absolutely no cravings or desire to use alcohol\".   Gone week of 9/4 for surgery   wk of 8/28  cravings, warning signs, triggers: none I went to a [employer] game and had no cravings.\"   Wk of 8/21/2023 She reports having no cravings to drink this week and having a \"wonderful week.\"       Wk of 8/14   lient discussed a big warning sign for self is thinking I can drink again, especially socially.  II think about  it regularly.  Group discussed that awareness of this is good and it " "is likely normal to do this.  I encouraged her to continue to discuss this and down the road to keep in mind with sponsor, therapist, etc. And work through it.  I told her it is really good she can say this outloud.   Something I am doing well: having plans of structure, especially when around situations I used to drink in     any cravings, warning signs, triggers: the [employer] could have been but wasn't .  I have supportive people and that helps, but so does the realization of my use patterns and how I feel better not using  wk of 8/7 Virginia said that a trigger for her was going to the [employer] for a comedy show. \"I haven't been there since starting tx\"  My  and I just kept walking and he also said \"I got you\".  She said her cravings only lasted a couple minutes.  We discussed how this can be common and some group members said they cannot go to the same places at all because of that kind of trigger.  She said \"I get it, and will continue to be aware of situations like this   wk of 7/31   cravings, warning signs, triggers: None this week   WK of 7/24-client denies triggers, cravings or warning signs this past week.  wk of 7/17:  cravings, warning signs, triggers:i went to a musical festival, had a few cravings, but also had insight when watching others use, \"glad it isn't me\"  Wk of 7/10 Had a PeTH test 7/7, see above. No issues. She discussed the Serenity Prayer and that she finds it helpful.  She also discussed having cravings at a 4 of 10 when cooking, and what she found helpful:  Processed, talked with , talk with group today about it.   Virginia said she really had quite a learning week with events in group, discussions and assignments.  \"My heart goes out to the new person the other day, but also I had some anger about all the disruptions and negativity from her\"  \"I am ready to move on, and learn what I learned, and know that could be any one of us\"  wk of 7/5  cravings, warning signs, triggers: " "none.  My adult kids are caring and asked if I thought I would be OK going to [facility], as there is drinking there.  She told them she appreciates the concern, but feels with them and little to no triggers, for quite some time, she didn't feel it was a problem.  Virginia said she had a wonderful time with her family and is not concerned about use over 4th of July holiday either, she has a good plan for spending time with family and focusing on good food.   wk of 6/26  Dim 5: cravings, warning signs, triggers: none.  Wed reading was focused blame.  It said blaming is hiding, that when we blame others we try to hide our character defects.  We discussed the meaning of character defects. \"I was in big denial for a long time, it took being told I would need a new liver for me to really realize what drinking was doing. \"it hit me like a ton of bricks when I finally really heard it\"  wk of 6/19  She participated in  Psychoeducation/Skills: Self-Care.  Client s completed self-care assessment and described self-care as feeling better physically. Client identified physical self-care they do well- attending medical appointments, one they do poorly or needs improvement- eating healthy food and barriers that prevent themselves from self-care - cost of food is expensive, the shopping and prep.  This topic will give a general overview of self-care: physical, environmental, emotional, social and spiritual. It will explore importance of self-care and the impact on recovery.   Wk of 6/12. Dim 5: warning signs, triggers and cravings: none   helpful coping: attending group. Examples of benefits of drinking or drug use, or other behaviors Physical, Social mental or emotional: confidence, escape from reality, \"friends\"     Main drawbacks connected to these: not real friends that only want to drink together.  Headaches and slurred speech.  Remoarse  Benefits connected to abstinence from addictive behaviors:more able to do me and be " "me, I enjoy trud friends, I remember things better and will have better memories      Drawbacks you see connected with abstinence from these:  Wk of she participated in the daily reading and said she liked it.  Virginia also gave meaningful feedback to client who discussed use episode: \"it could be anyone of us\"  She said this is important and she is learning from others sharing.  Week of 23: Client denies riggers or warning signs this past week. She said she liked the saying \"it is just as easy to create good habits as bad ones\"   Patient reports family members and her  are concerned about their substance use.  Patient reports their recovery goals are \"abstinence forever.\"  She has had no previous detoxes or treatments and needs to gain insight into healthy coping for relapse prevention.      Dimension 6: Recovery Environment -   Previous Dimension Ratin  Current Dimension Ratin  Family Involvement - not at this time  Summarize attendance at family groups and family sessions - NA  Family supportive of treatment?  Yes    Community support group attendance - no  Recreational activities - some camping, cooking and boating    Narrative - wk of .  RR changed to 1 she talked with her friend Chris  wk of 10/30, she said she met with her friend who has been going to .  My structure includes:  a trip to [place] for Mon's 89 birthday.  Meeting up with friends.  Time in the pool and a self care courtney day\"  Now that I have group 1 day per week, I will also talk with my friend in , Monday.      wk of 10/23  I met with a friend who has been sober 10 years.  wk of 10/16.  I find this group, my  and family, and many friends to be very supportive.  For structure and sober support for the weekend,  Virginia said she will be spending time with her family much of weekend, including a 2 year old bday party.  She said she is going to spend time journaling each day too.   wk of 10/9  5, but I am opening to " "this more and more\"  She participated in discussion on how to create healthy boundaries and was able to name physical boundaries like our body, personal space, sexual orientation, and privacy.   wk of 10/2  . [place]  participated in discussion of how addiction affects relationships and said: things that are hard are  even more difficult\"   wk of 9/25: sober support motivation: 4 on likert scale 1-low and 10 -high. She denies attending any sober support meetings. Client participated in an interactive educational session and learned about and discussed \"Community Based, Sober Support. this week. Client learned about 14 community based sober support meeting options and shared that she is interested in attending community based sober support meetings, that she only wants to attend the in person meetings not Zoom meetings. She shared after the discussions on the different types of sober support meetings that the one she \"liked the Rajendra and the 12 Steps\" meeting.     wk of 9/18  sober support motivation:  5, but I did make a call to a friend who has been sober 13 years and we are going to meet in a few weeks .   we discussed focused on self care over the next days and She intends to rest, babysit her grandkids with her  overnight, see her sister whom she has not seen for 4 months.     wk of 9/11 sober support motivation:  5, but I will look into SMART Recovery. She participated in Psychoeducation/Skills Goal Setting (RENEE)  This topic will give a general overview of short, intermediate and long term goals including the value of goal setting. She completed her goal setting worksheet and discussed things that can get in the way of her goal: others that suck energy out of her, old thinking about how nice a drink would be  Plan of structure for weekend: She said she will go camping with family this weekend in their new RV, she also has to rest a lot.  \"I love football and will watch most any game\"  absent wk o f9/4  wk " "of 8/28:\" I have a great deal going on with my upcoming surgery, will likely look into sober support when things settle down.  Right now my group is support\"  wk of 8/21 Client worked on her dimension 6 goals this week as she learned and discussed more about sober support. She continues to report a low desire to attend outside community based sober support meetings, however she does appear to be open to learning more about it.     wk of 8/14 [place] discussed that she went to [place] with friends who were drinking, but that they all said they would not need to drink. She said she appreciated that but it really didn't bother her in the least, as she likes being sober. Group discussed this type of situation and some group members said it would have been hard for them. Virginia said if it ever is, she will change what she does or ask them not to use. \"I have very dear friends and I know they mean it when they say the don't need to drink around me.  sober support motivation: 5   8/7 \"I am at 5 on wanting to attend community support, but find group support very helpful!  wk of 7/31  sober support motivation:  I have so much going on with medical and this meeting 3x per week, that realistically, I will not likely look into community support until after my surgery at the end of the month.   WK of 7/24-client's support are her family, she is not attending support groups and motivation to attend is 2 on Likert scale. Client shared she has learned this week is this is a continuous journey and staying positive is huge. To support her sobriety this week she will continue to surround herself with good people. She is grateful for home, shelter and stability.  wk of 7/17:  sober support motivation: this group and my family.     wk of 7/10: \"I am motivated for outside support at 2/10, but when I get done with Tuesday group, I think my motivtiaon will be higher\"  wk of 7/5   We discussed \"The power of Solitude\" and how spending time alone " "with ourselves may not be easy or even desirable, it it is key to getting to know self.  I emphasized that studies tell us we need each other to survive and be happy, but also when we loose the ability to be alone with ourselves, sometimes our overstimulated nervous systems suffer from no place to rest and recharge.  She said this is good to discuss, as she may even consider doing it a bit more, as it \"really does recharge me\" She said she is pretty good about finding alone time and also uses journaling.  I asked client how this applies to recovery and treatment:She said she will often take a topic from group, or a question and journal about.  She said it really helps   Wk of 6/26. She said sober support motivation is  2, but I think motivation will grow as I keep settling in  I talked with clients about taking responsibility for our actions, so we can be empowered and own our life and also change if we need to. We discussed resources including AA, SMART Recovery.  I encouraged clients to start exploring meetings.  Wk of 6/19.  Virginia reports with having group 3x per week, it is a good deal of support, \" I will continue to learn about peer support\"  wk of 6/12client discussed resources sober support, including AA, Liver Transplant support and [place] recovery connection  wk of 6/6  goals for the wknd: look into volunteering at [facility] for writing card.  \"no community support attendance at this time\"   Client is not currently attending community sup[ort and does not have a sponsor. Client's supportive people are her family. Client shared she has friends and has not talked with many about her current situation, because \"I am a private person,\" (treatment and needing transplant).  Patient reports they are involved in community of holden activities    They reports spirituality impacts recovery in the following ways:  \"There was some changes as far as .  We do belong to a [employer] but it's been a number of years since we've " "been.\"  Patient believes her spirituality and sobriety are connected. Patient reported having 3 children, all adults.  Patient has 2 grandchildren.    She lives with her spouse and son. recreational/leisure activities: camping, cooking, boating, playing board games and card games.  Patient is currently disabled due to her Crohn's Disease.  Patient reports their income is obtained through SSDI disability; spouse.  Patient does not identify finances as a current stressor.         Progress made on transition planning goals: just began tx    Justification for Continued Treatment at this Level of Care:   She reports she wants sobriety for her life and has completed A plan that will help her do tis.  She has  learned long term relapse prevention coping skills, she reports finding that attending group is helpful for her and she is learning but she is feeling close to ready to be completed   She said she did not think she would get much out of this but is getting so much support and understanding of healthy coping.  She is not attending community support, at this time, \"but I find my group so helpful and now met with a sober friend and will attend the meetings he goes to.  She is working on goals and assignments to complet 11/15    Virginia continues to discuss gratitude for sobriety and meeting her group members.    Treatment coordination activities this week: week of 11/8, coordinated information with Social Yimi worker, regarding Virginia's progress  wk of 9/25 staffed with SOLANGE Ponce. wk 9/18 social goran Yang on LT asked her time frame to be done, since Virginia just had surgery and there is no issue on the horizon about getting a new liver   wk 8/28 Reviewed notes from sub Galen Rowland  7/31 reviewed Onelia Singh notes and discussed client with her.  7/18  Discussed client info with sub Onelia Singh   Discussed client info with sub Onelia Singh  Need for peer recovery support referral? No    Discharge Planning: " next week will do Aftercare plan      Has vulnerable adult status change? No    Interdisciplinary Clinical Supervision including:  no    Are Treatment Plan goals/objectives effective? Yes  *If no, list changes to treatment plan:    Are the current goals meeting client's needs? Yes  *If no, list the changes to treatment plan.  Plan:  Continue per Master Treatment Plan    *Client agrees with any changes to the treatment plan: Yes  *Client received copy of changes: Yes  *Client is aware of right to access a treatment plan review: Yes     Staff Members Contributing To Weekly Review:      Judi Dodd, MS, LADC, LPC

## 2023-11-08 NOTE — GROUP NOTE
"Group Therapy Documentation    PATIENT'S NAME: Abiola Matute  MRN:   [id number]  :   1965  ACCT. NUMBER: [id number]  DATE OF SERVICE: 23  START TIME: 12:00 PM  END TIME:  2:00 PM  FACILITATOR(S): Judi Dodd LADC  TOPIC: BEH Group Therapy  Number of patients attending the group:  6  Group Length:  2 Hours    Group Therapy Type: Addiction    Summary of Group / Topics Discussed:    Boredom, Boundaries, Cognitive Therapy Techniques, Communication, Choices in Recovery, Leisure Exploration/Use of Leisure Time, Meditation/Breathing Exercises, and Stress Management      Attestation:   Dr. Rj MD - Provides oversight and supervision of care.     Today   We checked in on anything clients wanted to review or discuss about Monday group.   We also did \"The Gal in the Glass\" poem for an opening reading.  I used this to lead into discussion as some clients have been coming in late, and some clients have expressed recent concern about feeling some invalidation when they are in the middle of discussing some important things and clients come in late and also don't pay attention to the person talking.  I have talked with clients about feedback, speaking from the \"I\" and also paying more attention to not so much talking over each other.  Clients have improved, but it is still happening frequently.    I reminded clients to be more aware of pausing when giving or receiving feedback, so be more mindful. I also reviewed levels of care with clients, as they seemed unaware of this. I gave clients a handout asking questions What I want for myself and sobriety, what I am willing to do to get it, what I am willing to do with my treatment:  What I like about treatment and group:   What I want in the future from group, what I am willing to give to group members is:  and on scale of 1-10, how much effort and time have I put into my recovery:    After these questions, I gave then a follow up assignment on a change " "plan for something they choose.  This is due next Wed.  A group member presented her assignment On \"What My Relapse would look like\"       Group Attendance:  Attended group session  Patient's response to the group topic/interactions:  cooperative with task, discussed personal experience with topic, and expressed readiness to alter behaviors  Patient appeared to be Actively participating and Attentive.        Clients specifice information :  Today's session focused on check in of all dimensions Dim 3 short mediation and dim 5 Relapse prevention.   [place] joined  2 today as she is the only person in  3 and wanted to join group, rather than just do an individual session.  She did do her assignment:  \"What My Relapse Looks Like\": She said \"it was such a tough assignment, but I get why you have us do it\" She told group she was in tears looking at the reality of relapse, but it even gave her more motivation to continue on the path for sobriety\"  Group told her how powerful her sharing this was.  She said she is feeling ready to complete group, not that she wants to as she will miss the structure and group support, but she said she hopes to stay in contact with group members and she is building her outside support.  She will do final assignments and meet for aftercare plan next Wed.     What I want for myself and sobriety: increased self worth,    what I am willing to do to get it: self reflect, listen, not drink  what I am willit to do with my treatment: complete it, be honest and open minded   What I like about treatment and group:   honesty, safety  What I want in the future from group: stay in touch with members, use humor and seriousness    what I am willing to give to group members is:  humor, feedback, non judgement  and on scale of 1-10, how much effort and time have I put into my recovery:10      P) Individual session, Goodbye letter, RCA     Judi Dodd, MS, LADC, LPC    Attestation:  Dr. Rj CHEN - " Provides oversight and supervision of care.

## 2023-11-08 NOTE — PROGRESS NOTES
Transplant Social Work Services Progress Note    Date of Initial Social Work Evaluation: 2/14/2023  Collaborated with: IOP RENEE treatment (SOLANGE Rogers)     Data: [place] is in evaluation for liver transplant.   Intervention:   Per SOLANGE Warner patient will be done with treatment in two weeks. She has been an excellent member of group. She is committed to sobriety, and feels like treatment has been beneficial for learning about herself. She has developed good coping skills, and sober support.   [place]ia's last positive Peth was 1/20/2023. All subsequent Peths have been negative.     Assessment: [place] engaged and will be completing her substance use program in two weeks as recommended. Her counselor does not have any concerns regarding her engagement, motivation or sobriety. Pending full completion her chemical dependency program in two weeks, [place] would be an acceptable candidate for liver transplant from a psychosocial perspective.   Education provided by : No new education  Plan: Message routed to Jeannette Cervantes, and Fabio Hansen.     RODGER Newby, [facility]  Liver Transplant   [facility]  Phone: [phone]  Pager: [phone]

## 2023-11-14 NOTE — PROGRESS NOTES
[facility] Weekly Treatment Plan Review      Attestation:   Dr. De La Rosa - Medical Director - Provides oversight and supervision of care.        Date Span: Monday: 23 through 23     Date     Group Therapy  PH 3 0 hours  completed Completed tu skills  2 hours ph 3 0 hours 0 hours       Individual Therapy     34 min            Family Therapy                 Psychoeducation                 Other (Specify)                   Client had no absences this week     Wk o f  Virginia is excused this week due to holiday and to recovering from her surgery last week.  All notes below are based on last attendance and will be updated when client returns next week    Clients individual sessions noted below. Client had 1 excused absence this week, due to having surgery.    Client has no absences this week    Client has one excused absence this week , due to medical appts.  Client excused from group , due to medical appointment.     Program Running Totals: (No Phase 1 IOP due to this program being only OP level of care)  Total # of Phase 2 OP Group Sessions: 41 for 82  Virginia gets 60 sessions for 120 hours at this time and has 47    Total # of Phase 3 OP Recovery Management Group Sessions: 3 for 6 hours    Total # of 1:1 Sessions:  1 hour BALWINDER   1 hour , , , 10/23, 11/15                                    Darinel , , ( & -Onelia)                    Weekly Treatment Plan Review     Treatment Plan initiated on: .    Dimension1: Acute Intoxication/Withdrawal Potential -   Previous Dimension Ratin  Current Dimension Ratin  Date of Last Use 22  Any reports of withdrawal symptoms - No  wk of : Client reports no change in sobriety date and exhibits no sx of intoxication or withdrawal.      Dimension 2: Biomedical Conditions & Complications -   Previous Dimension Ratin  Current Dimension  "Ratin    Medical Concerns: wk of , \"no concerns, ongoing appts with medical team of course, but nothing new\"  wk of , \"no concerns\"  wk of 10/30: \"no concerns, doing well.  RR changed from 2 to 1.  She continues to meet with her care team as needed but after her surgery for cancer she is doing well and \"no immediate issues with liver\"  wk of 10/16, \"doing very well\"   wk of 10/9 \"still healing from surgery, but doing very well now\"  wk of 10/2 \"still healing from surgery, but good\"  wk of : Client denies any new or worsening medical issues.  wk of   Virginia discussed that she was not aware before this surgery that chances are high she will still need a transplant someday.  She said she went through a range of emotions, as she was not prepared for this. \"I had to go through what I did, and now feel better about keeping my life on the positive and 'I will deal with that bridge when I get to it\".  Group expressed empathy for all she has been through and also her positive attitude of gratitude.    I told her Celia,  on LT asked her time frame to be done, since Virginia just had surgery and there is no issue on the horizon about getting a new liver, I wanted to make sure I was understanding so as to know if Virginia and LT are good with normal schedule, which would have her completing about the end of November. Virginia met with Dr. Saavedra today and he said as far as anyone could ever see there is no thought or idea she will have a transplant.  Sometimes a person needs a new liver years from now, but things are looking good at this time.  Virginia said she has nothing special she wants to work on \"I keep learning from all that we do and I am sure building bonds with the others\"     wk of  \"no concerns, continuing to recovery from surgery\"  Surgery  for her cancer  / Reports having no new or worsening health conditions. Reports having an appointment this week on 2023 with her " "Cardiologist.     wk of 8/14 No concerns I did  my pre op eval for my upcoming surgery with Dr. Bear and nurse Sakina Glass  wk of 8/7 No new concerns  wk of 7/31:  no, just good news that they may be able to remove all my tumore and I may not need a liver.  WK of 7/24-Client has an appointment with surgeon regarding increase in tumor. She is excused from programming due time conflict.  wk of 7/17 She had appt with radiology on 7/14, continues to work with her team due to LT, cancer and rash.  Virginia told group she is dealing with some news, medically, but working with her Drs.  She said she isn't wanting to share a whole lot, until she gethers her information, but she did want group to know she has support and appreciates just being able to share what she did  wk 7/10Shlauren reports rash and working with , also had CT and MRI related to ongoing care for cancer and need for liver.  wk of 7/3  I get an infusion 1x per month and usually by week 3, I feel more sore and more tired, but I am grateful for the infusions.  \"no concerns beyond ongoing liver cancer and Crohns\"  wk of 6/12 She saw Jazmin Barrios at Urgent care for skin issues.  She also said she met with her GI practioner  She reports having liver cancer and Crohn's disease.  \"right now I am doing pretty well  Outside medical appointments this week (list provider and reason for visit)on 9/15 medical appt with Jeannette Cervantes gastroenterologist   8/29 Surgery with care team for cancer  wk of 8/14 No concerns I have my pre op eval with Dr. Glass, for my upcoming surgery   Wk of 8/7 No appts this week   wk of 7/31: no appts.  wk of 7/24 she saw surgeon Junior Bonilla, she is dealing with her liver tumor  wk of 7/17 She had appt with radiology, Dr. Shah,on 7/14, continues to work with her team due to LT, cancer and rash  : wk of 7/6 she saw Susanna RAZA for rash on leg.  Had a PeTH test 7/7, see below. No issues  : 6/13 she saw Jazmin Meng PA-C for a " flare up of her psoriasis likely related to her other medical issues, per client     Current Medications & Medication Changes:  Current Outpatient Medications   Medication    ammonium lactate (AMLACTIN) 12 % external cream    clobetasol (TEMOVATE) 0.05 % external cream    furosemide (LASIX) 40 MG tablet    inFLIXimab (REMICADE) 100 MG injection    Multiple Vitamins-Minerals (MULTIVITAMIN & MINERAL PO)    senna-docusate (SENOKOT-S/PERICOLACE) 8.6-50 MG tablet    spironolactone (ALDACTONE) 100 MG tablet    triamcinolone (KENALOG) 0.1 % external cream    UNABLE TO FIND     No current facility-administered medications for this encounter.     Facility-Administered Medications Ordered in Other Encounters   Medication    BREEZA Lemon-Lime flavored oral liquid for Neutral Abdominal/Pelvic Imaging 1,000 mL    hyoscyamine (LEVSIN/SL) sublingual tablet 125 mcg     Medication Prescriber:  Linda Macdonald  Taking meds as prescribed? Yes  Medication side effects or concerns:  no        Dimension 3: Emotional/Behavioral Conditions & Complications -   Previous Dimension Ratin  Current Dimension Ratin  PHQ2:       2023     1:00 PM 10/25/2023     3:00 PM 10/4/2023     4:00 PM 2023     2:00 PM 9/15/2023     9:12 AM 2023     5:00 PM 2023     8:00 AM   PHQ-2 (  [employer])   Q1: Little interest or pleasure in doing things 0 0 0 0 0 0 0    0   Q2: Feeling down, depressed or hopeless 0 0 0 0 0 0 0    0   PHQ-2 Score 0 0 0 0 0 0 0    0   Q1: Little interest or pleasure in doing things       Not at all   Q2: Feeling down, depressed or hopeless       Not at all   PHQ-2 Score       0      GAD2:       2023    10:00 AM 2023     8:00 AM 2023     5:00 PM 2023     2:00 PM 10/4/2023     4:00 PM 10/25/2023     3:00 PM 2023     1:00 PM   TAINA-2   Feeling nervous, anxious, or on edge 1 0    0    0    0    0 1 0 1 1 1   Not being able to stop or control worrying 0 0    0    0    0    0 0 0 1 0 0    TAINA-2 Total Score 1 0    0    0    0    0 1 0 2 1 1     PROMIS 10-Global Health (all questions and answers displayed):       5/25/2023    10:00 AM 7/19/2023     8:02 AM 8/16/2023     5:00 PM 9/20/2023     2:00 PM 10/4/2023     4:00 PM 10/25/2023     3:00 PM 11/1/2023     1:00 PM   PROMIS 10   In general, would you say your health is:  Good        In general, would you say your quality of life is:  Very good        In general, how would you rate your physical health?  Fair        In general, how would you rate your mental health, including your mood and your ability to think?  Very good        In general, how would you rate your satisfaction with your social activities and relationships?  Excellent        In general, please rate how well you carry out your usual social activities and roles  Very good        To what extent are you able to carry out your everyday physical activities such as walking, climbing stairs, carrying groceries, or moving a chair?  Mostly        In the past 7 days, how often have you been bothered by emotional problems such as feeling anxious, depressed, or irritable?  Rarely        In the past 7 days, how would you rate your fatigue on average?  Mild        In the past 7 days, how would you rate your pain on average, where 0 means no pain, and 10 means worst imaginable pain?  3        In general, would you say your health is: 3 3    3    3    3    3 3 4 4 4 4   In general, would you say your quality of life is: 4 4    4    4    4    4 3 4 4 4 4   In general, how would you rate your physical health? 3 2    2    2    2    2 3 3 3 4 3   In general, how would you rate your mental health, including your mood and your ability to think? 4 4    4    4    4    4 4 5 4 4 3   In general, how would you rate your satisfaction with your social activities and relationships? 5 5    5    5    5    5 4 5 4 4 4   In general, please rate how well you carry out your usual social activities and roles. (This  includes activities at home, at work and in your community, and responsibilities as a parent, child, spouse, employee, friend, etc.) 5 4    4    4    4    4 4 4 4 4 4   To what extent are you able to carry out your everyday physical activities such as walking, climbing stairs, carrying groceries, or moving a chair? 4 4    4    4    4    4 3 4 3 4 4   In the past 7 days, how often have you been bothered by emotional problems such as feeling anxious, depressed, or irritable? 2 2    2    2    2    2 2 2 1 2 2   In the past 7 days, how would you rate your fatigue on average? 2 2    2    2    2    2 2 2 1 1 3   In the past 7 days, how would you rate your pain on average, where 0 means no pain, and 10 means worst imaginable pain? 4 3    3    3    3    3 2 3 2 1 1   Global Mental Health Score 17 17    17    17    17    17 15 18 17 16 15   Global Physical Health Score 14 14    14    14    14    14 14 15 15 17 14   PROMIS TOTAL - SUBSCORES 31 31    31    31    31    31 29 33 32 33 29     Mental health diagnosis none  Date of last SIB:  never  Date of  last SI:  never  Date of last HI: never  Behavioral Targets:  see tx plan  Risk factors:  Client dealing with liver disease/cancer and Crohns, client needs a new liver,   Protective factors:  spirituality, forward/future oriented thinking, safe and stable environment, regular physical activity, living with other people and structured day  Current MH Assignments:  see tx plan      Narrative:wk of 11/13  Virginia completed PHQ-2: 0    TAINA-2:  0, PROMISE  32 and reports having gained good insight into healthy coping for any anxiety symptoms.  She participated in meditation at end of group.  wk of 11/6, I am doing well and feeling little to no stress, anxiety or depression.  She participated in meditation 11/8  wk of 10/30  Client completed PHQ-2, TAINA-2, PROMISE.  It also focused on structure over the weekend:  My structure includes:  a trip to [place] for Mon's 90.    She participated  "in mindfulness vs mindlessness.Virginia received handout on mindfulness and mindlessness and participated in discussion:  This all looks good, but hard.  I like how this is layed out. I like the ideas on here.  I reminded clients to pull the handout out each day, even.  I said it is not about looking at it and judging if you did or didn't do something, it is about using it for awareness to bring you to the present moment.  It is about empowering self to be fully present with what is.  and way down but reading this article and having 10 things from the list she will use as regulars is helpful.  She said she is learning how boundary setting is helping with unneeded anxiety in some instances. She also completed  Healthy coping for anxiety .  She said her anxiety has gone way down but reading this article and having 10 things from the list she will use as regulars is helpful.  She said she is learning how boundary setting is helping with unneeded anxiety in some instances  .  Client completed PHQ-2, TAINA-2, PROMISE.  It also focused on stru   wk of 10/23.  She attended individual session with this counselor and  completed PhQ-2, DAD-2 and PROMIS.  we discussed her Consequences of Use assignment.  She named her health as the biggest consequence but also her family was worried, and she did not know a lot of this until close to starting tx.  She said another consequence \"I am much more clear, not \"hangover or foggy brain\" She said the discussions on radical acceptance are helping her grow her awareness of loving self better, and how this and recovery can work together.  \"no thoughts of self harm or concerns\"  on likert scale 1-low and 10 -high:   Anxiety: 0  stress: 1  depression 0      issues to discuss and helpful coping: this group, assignments.   Virginia was able to teach back the techniques discussed on disputing negative thoughts from the positive psychology information.  She said it was helpful   wk of 10/16 She " "participated in discussion on radical acceptance and also gave and received feedback well from group today. \"I appreciate feedback\"   3: \"no thoughts of self harm or concerns\"  on likert scale 1-low and 10 -high:   Anxiety: 1  stress: 1  depression 0      issues to discuss and helpful coping: this group, assignments, talking with my . She participated in psychoeducation on  Distress Tolerance-Radical Acceptance   This topic will focus on skills designed to keep our pain from turning into unnecessary suffering.  Virginia received handout and participated in discussion on various strategies to effectively manage distressing situations and said this was a helpful discussion.  Client was able to teach back what radical acceptance means and said \"this is good stuff\"    wk of 10/9   \"no thoughts of self harm or concerns\"  on likert scale 1-low and 10 -high:   Anxiety: 0  stress: 1  depression 0      issues to discuss and helpful coping: meeting and talking with others  wk of 10/2 She completed PHQ-2, TAINA-2, and PROMISE and she expressed no need for further discussion at this time.  Client took part in a discussion of trauma as associated with addiction and was introduced to the idea of radical acceptance. She participated in a \"[place]\" guided visualization which she described as helpful.   wk of 9/25: no thoughts of self harm. On likert scale 1-low and 10 -high:   Anxiety: 0; depression: 0; stress: 0   wk of 9/18 Today I had Virginia answer 3 questions before doing the daily reading focused on acceptance, as it related to self and to what life presents. We had a long discussion on This.   \"no thoughts of self harm or concerns\"  on likert scale 1-low and 10 -high:   Anxiety: 1  stress: 1  depression 1  issues to discuss and helpful coping: talking with my care team, using breath work. Talking to my .   She said she has not felt like eating much, but the positive of it is she has lost 10 pounds.  Virginia  " "completed PROMIS, scoring 33. TAINA-2, scoring 0, and PHQ-2, scoring 0.  She said she sometimes has worries in her life, but overall feels blessed to not have a lot of anxiety or depression.   wk of 9/11  : \"no thoughts of self harm or concerns\"  on likert scale 1-low and 10 -high:   Anxiety:  2  stress: 1 depression 0  issues to discuss and helpful coping:  right now resting  wk of 8/28, RR changed to 0 from 1.  Virginia continues to report healthy coping for anxiety symptoms.  She remains optimistic and finds using tools learned are helpful, as is discussion with supportive people.  : \"no thoughts of self harm or concerns\"  on likert scale 1-low and 10 -high:   Anxiety:  7  stress: 1 depression 2     Virginia will be having her surgery tomorrow.  She discussed \"anxiety mireya high, yet I am hopeful\"  She said she has done all she can with her health and not drinking and preparing herself mentally.  She said she was glad she had group to attend and gets \"immense support\"  wk of 8/21/2023 She reports her anxiety is down and she experiencing no stress or depression. Client worked on her Dimension Three goals this week as she learned about and discussed the topics of Shame, Stigma and Mindfulness.     wkof 8/14  Ways I can think differently: listen to my critical voice and change my thoughts  Ways I can do things differently:  talk to myself like I would a friend.  \"no thoughts of self harm or concerns\"  on likert scale 1-low and 10 -high:   Anxiety:  0  stress: 0 depression 0  issues to discuss and helpful coping:  doing fun things, attending this group, being sober  wk of 8/7 ratings on likert scale 1-low and 10 -high: Anxiety: 2 stress: 1 depression 2. She participated in discussion on \"change the channel\" when getting into an anxiety loop. \"It is a simple way to do something more productive. Virginia actively participated in discussion on Thought distortions and discussed which ones are the ones she actively wants to focus on for " "the time being: catastrophising and prediction.  She discussed  two situations where the techniques could be helpful, including: Should's and musts and not setting up unrealistic expectations of how she or life should be.  I want to remind myself \"until someone has walked a mile in my shoes, they don't get to  me and the same goes for me with them.  Virginia actively participated in meditation and said it was good to take that time to be.  \"I liked it\"  wk of 7/31  she participated in psychoeducation on Understanding my anxiety   This topic helped client to gain awareness of thoughts and feelings associated with anxiety and act with awareness when feeling anxiety and use helpful coping.  Client did actively participate in group discussions, and was able  to identify anxious thoughts, physical feelings when having anxiety: racing heart, shaking, confusion  \"why me\"  She was able to  identify 2 helpful coping methods: breath, talk with her  who is a comfort, stop the story telling/negativity when aware.  Virginia discussed that her tumor is shrinking and she is \"cautiously optimistic\". She said she wanted to share that she has many feelings associated with this, \"it even could mean I won't need a LT, but that remains to be seen\"  I told Virginia she has been shown how with our spirituality and belief, sometimes things can happen that were never anticipated.  She said \"so true\" I never had an idea that was even a part of my hope\"      Wk of 7/24- client denies thoughts of self-harm. On Likert scall 1-low and 10-high client rates anxiety 1  depression 0 and stress 2. She shared her new diagnosis of tumor growth has impacts stress and anxiety.   wk of 7/17: no thoughts of self harm or concerns\" on likert scale 1-low and 10 -high:   Anxiety:  5  stress: 5 depression 1  issues to discuss and Helpful coping:  All related to my medical, but I have great medical team and family support.  Client said of the reading:I like the " "ongoing discussions of shame and this reading brought me awareness that I have some to deal with regarding the nature of why many get liver cancer.       wk of 7/10:  She was able to repeat the steps of 5 senses, as a review.   \"no thoughts of self harm or concerns\".  Clients feedback to group member  on likert scale 1-low and 10 -high:   Anxiety:  2  stress: 2  depression 2  issues to discuss and Helpful coping: talking with you guys.  I had a craving that was pretty big this week.  Psychoeducation/Skills The thinking-feeling connection. This topic will address information from the [employer] about how our thoughts influence our feelings.  Client was able to define automatic thoughts and make a connection with some that she has.   She said that she has been continueing to gain awareness of her beliefs vs perceptions vs what is a thought and what is a feeling.  Discussed that reminding herself it is ok to have thoughts and feelings, but they are not facts for other people.    Client participated in lovingkindness meditation and said it was just what she needed today and it that it was helpful and grounding.  \"Thank you for doing it\"    wk of 7/3: Clients participated in 5 senses psychoeducation discussion, participated in guided practice and was able to name 5 parts, and said she feels it will be a helpful tool  \"no thoughts of self harm or concerns\".   I discussed the research on gratitude journals and expressed she has seemed like she has an \"attitude of gratitude\".  She said she feels blessed and hopes she does.  on likert scale 1-low and 10 -high:   Anxiety:  0  stress: 0 depression 0  issues to discuss and Helpful coping: I am learning to say no, and that boundaries are good    wk of 6/26  Attended MH skills group with Darinel Au.    She participated in psychoeducation on Core beliefsThis topic will discuss core beliefs, as a lens through which we see life and how that affects our " "choices.  Virginia said the discussion on core beliefs is something she will spend some time on, as the awareness is helpful.  I told her we will follow up on Wednesday with more that can be helpful when we notice harmful core beliefs.   \"no thoughts of self harm or concerns\"  on likert scale 1-low and 10 -high:   Anxiety:  1 stress: 1 depression 0  issues to discuss and Helpful coping: learning about self, anxiety and stress  wk of 6/19 \"no thoughts of self harm or no concerns\".  She reports on likert scale 1-low and 10 -high:   Anxiety:  1  stress: 1 depression 0  issues to discuss and Helpful coping: getting to know people better in group, hearing other's helpful coping.  Attended MH skills group with Darinel uA.  We discussed anticipating some situations and \"gearing up\" by practicing deep breathing. I reminded client of previous discussions where beliefs often guide how we feel about certain situations, but that we can reframe them, or look at them for the particular situation.      Wk of 6/12  I am feeling much better and less anxiety about group attendance as I continue to attend and get to know others. \"no thoughts of self harm or concerns\". On likert scale 1-low and 10 -high:   Anxiety:  1  stress: 1 depression 1  issues to discuss and Helpful coping:  Breathing, getting to others in group  Wk of 6/6 \"no thoughts of self harm\"  Attended MH skills group with Darinel Au  Week of 5/31/23 :Client reports feeling anxious due to first treatment experience, not knowing what to expect, her  Mother's declining health-needing a placement for higher level of care, spouse recently lost his job-need to obtain COBRA insurance and unable to care for grandchildren because of attending treatment. Client was saw Traci Aguilar and has not seen them in about 6 weeks. To follow up with primary counselor re: seeing psychotherapist or return to [facility].  Client rates the following on a scale of 1 (low) to 10 " "(high):  Anxiety- 5  Depression -1  Stress- 5 as a result of above concerns.   Client participated in psychotherapy/education on self-care, introduction to relaxation meditation-tensing and relaxing muscles, and sleep hygiene.  Current Mental Health symptoms include: . Active interventions to stabilize mental health symptoms this week :   At SI her PHQ-2 score was 0 and his TAINA-2 score was 1.  PROMISE 10 was  31:  She reports that at this time she is dealing with her mother's living situation and health issues. She reports that her father has passed away and that she is estranged from 1 sister.         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  She reports she wants ongoing sobriety and is grateful for having found this group.  RR changed to 0 She reports 10/10 on motivation for ongoign sobriety.   What I want for myself and sobriety: increased self worth,    what I am willing to do to get it: self reflect, listen, not drink  what I am willit to do with my treatment: complete it, be honest and open minded   What I like about treatment and group:   honesty, safety  What I want in the future from group: stay in touch with members, use humor and seriousness    what I am willing to give to group members is:  humor, feedback, non judgement  and on scale of 1-10, how much effort and time have I put into my recovery:10  wk of 10/30  RR changed to 0.  She is preparing to complete tx and reports wanting ongoing sobriety.  wk of 10/23.  Virginia and I did an individual session to discuss Ph 3 and we went over a couple individual assignments.  Client said of powerlessness and unmanagability:  \"I do think I am powerless over " "alcohol\"   I also think I have power in choosing not to drink.  wk of 10/16    10 on  motivation for sobriety   Virginia discussed feeling ready to begin Ph 3 pretty soon. We will meet next Monday to discuss plan.  She also said she has learned about patience for self and others.  She said some things take time.  \"I am better able to slow down, hear others, listen to myself\"   10/9 iVrginia said \"I am your disease\" made her tear up and it is awakening\"  she said she is so much more accepting of her disease and not drinking, but if sh e wasn't this would sure be a motivator\"  wk of 10/9  she participated in psychoeducation on Readiness to change (stages of change).  This topic gave a general overview of stage of change models and how they apply to the personal experience of each patient.  She participated in discussion, received handout on stages and also listed which stage she was in with: Tx: action   sobriety/abstinence:  action    sober support: determination      community support:  contemplation     my individual goal: action  \"I am your disease\" and she said \"it is so powerful\"       wk of 10/2 [place] expressed understanding of limits of discussion in group, including politics and [employer]. Counselor emphasized that  beliefs are ok, just sometimes how one express them might be unfair or sound judgemental. She said she is motivated for sobriety 10/10  wk of 9/25: motivation for sobriety =10 on likert scale 1-low and 10 -high. She reports accepting that she is an alcoholic.  wk of 9/18  on likert scale 1-low and 10 -high: 10 on motivation for sobriety. She   wk of 8/28  on likert scale 1-low and 10 -high: 10   motivation for sobriety 10.  Motivated to continue a life of sobriety, \"I am learning so much about self.  wk of 8/21/2023 Client expressed this week about her alcoholism that that she liikes that \"I'm getting better not as shameful.\" That for her it has helped to accept alcoholism, \"occurs as a disease.\"     wk of " "8/14  Virginia told group \"I know I am an alcoholic\" she said she is so glad to have had to find her way to the group, even if it was through health issues or she would still be doing damage.  motivation for sobriety; 10 she likes being sober and reports she likes learning and attending group.   wk of 8/7  motivation for sobriety 10.  Motivation is high to have quality of life  wk of 7/31  on likert scale 1-low and 10 -high:  motivation for sobriety: 10   WK of 7/24-motivation 10 on Likert scale.she expressed strong commitment to sobriety, see notes dated 7/25 for more details.  wk of 7/17:  on likert scale 1-low and 10 -high:  motivation for sobriety: 10+  wk of 7/10  My motivation for sobriety is 10/10\".  I think it is so important to attend and be dedicated to support in this group, and to \"say things outloud, so it takes the power away, as you say, Alana\"  wk of 7/3  on likert scale 1-low and 10 -high:  motivation for sobriety 10.  Virginia said Mondays session really stuck with her and she thought about a lot of things and is really understanding why attendance and building on the days and lessons is important.     wk of 6/26  sober support motivation:  2, but I think motivation will grow as I keep settling in and I am motivated 10/10 for sobreity.    Wk of 6/19:  Client reports motivation for sobriety is 10 (high) Motivation for attending community support 5 (on scale 1 low-10 high).   Virginia expressed to her group how helpful they are to her and how much she appreciates all that members shared about their stories.  She said she gains such helpful insight for self   Week of 6/12 [place] discussed that she has been late a couple times, but realized with 2 other members gone, how this speaks to the importance of being on time and valuing and respecting others time, too.   She will also miss tomorrows group due to a long awaited medical appt. Dim 4: on likert scale 1-low and 10 -high:  Motivation for sobriety:  10  " "motivation for sober support attendance: not doing yet  Wk of   Virginia also said she finds motivation for sobriety in her family and friends, \"but really deep down need to do this for myself\"  Told counselor and group how she didn't think she would get much out of tx and already is getting so much and is grateful for older adult group   Week of 23: Client reports motivation for sobriety is 10 (high) Motivation for attending community support 5 (on scale 1 low-10 high).  The patient is motivated, to explore treatment.  She said \"of course I am partly here to fulfill requirements for liver transplant, but I want to learn healthy coping for lifelong sobriety.      Dimension 5: Relapse / Continued Problem Potential -   Previous Dimension Ratin  Current Dimension Ratin  Relapses this week - None  Urges to use - None  UA results -   No results found for this or any previous visit (from the past 168 hour(s)).      Using ReSet Clem: N/A    Narrative-wk of , RR changed to 0   She did do her assignment:  Recovery Care plan and it seemed very well thought out.  She included such things as working on setting good boundaries, meeting with her sober friend and attending AA, continuing sober support with her group members, and working with her medical team.  She reports wanting a life of sobriety for herself, one day at a time.  She shared her Goodbye letter to alcohol.  Group members all seemed to take a big breath as it was powerful. They shared how much it meant an dhow much they learned from [place].  Group members wished her well and those that hadn't exchanged numbers now did.  She said she is feeling ready to complete group, not that she wants to as she will miss the structure and group support, but she said she hopes to stay in contact with group members and she is building her outside support.  She thanked this counselor and teared up saying goodbye to group, as well as when she shared her letter  wk of " "11/5   She did do her assignment:  \"What My Relapse Looks Like\": She said \"it was such a tough assignment, but I get why you have us do it\" She told group she was in tears looking at the reality of relapse, but it even gave her more motivation to continue on the path for sobriety\"  Group told her how powerful her sharing this was.    wk of 10/30  A trigger may be meeting with my brother in law, at my Mom's birthday,.we have severed ties, but due to it jacky 90th birthday all family will be there.  She has a plan for exiting if needed and will be \"staying as far away as possible\"  wk of 10/23   report on cravings, warning signs, triggers: only 1 this week and ironic, it was when I met with the friend who is sober.  \"He kept repeating things and it was exhausting\"  We went over her goals and tx plan, due to moving to  3.   wk of 10/16 cravings, warning signs, triggers: She said she does have some cravings and triggers, but feels confidnet in her tools and support.  \"I above all remember to take a step back, take a breath and think about risk vs rewards of using\"   Wk of 10/9 cravings, warning signs, triggers: \"none this week\" .  She said  \"I have even more acceptance of living a life of sobriety\". Group congratulated her on her movement and acceptance and hard work   wk of 10/2: RR changed 20 1.  She reports having craving of 1 when having dinner with friends. \"I ordered a club soda, and indulged in conversation. She reports cravings at 1 of 10 triggered by watching football with drinking friends. She reports dealing with cravings by getting away from the group for a short time by taking a walk outdoors. Client was able to teach back information for urge surfing and said this is a good tool.  She was congratulated by writer and group peers for having obtained over 9 months of sobriety    wk of 9/25: cravings =1 on likert scale 1-low and 10 -high. Reported dinner with friends as a trigger but reported only minimal " "cravings. She reports engaging in conversation and ordering a club soda as helpful coping skills.  wk of 9/18  She participated in Psychoeducation/Skills Relapse Prevention (RENEE)  This topic gave a general overview of basic relapse prevention, including definitions of warning signs, triggers and cravings and the importance of addressing healthy coping skills for ongoing relapse prevention.Patient was able to  name 4 of their warning signs and triggers, including:  Too much time with others whom don't like to stop drinking, concerts/shows if I don't have a plan  Patient was able to name 4 healthy coping mechanisms for dealing with their warning signs and triggers: including: making a plan of structure  wk of 9/11 Sights, sounds and situations that are triggers : concerts and comedy shows, but support, discussion and a fun NA drink are how I deal with it.   Virginia said each day she is healing more from surgery but is still quite sore.  \"I have had absolutely no cravings or desire to use alcohol\".   Gone week of 9/4 for surgery   wk of 8/28  cravings, warning signs, triggers: none I went to a [employer] game and had no cravings.\"   Wk of 8/21/2023 She reports having no cravings to drink this week and having a \"wonderful week.\"       Wk of 8/14   lient discussed a big warning sign for self is thinking I can drink again, especially socially.  II think about  it regularly.  Group discussed that awareness of this is good and it is likely normal to do this.  I encouraged her to continue to discuss this and down the road to keep in mind with sponsor, therapist, etc. And work through it.  I told her it is really good she can say this outloud.   Something I am doing well: having plans of structure, especially when around situations I used to drink in     any cravings, warning signs, triggers: the [place] concert could have been but wasn't .  I have supportive people and that helps, but so does the realization of my use patterns and how " "I feel better not using  wk of 8/7 Virginia said that a trigger for her was going to the casino for a comedy show. \"I haven't been there since starting tx\"  My  and I just kept walking and he also said \"I got you\".  She said her cravings only lasted a couple minutes.  We discussed how this can be common and some group members said they cannot go to the same places at all because of that kind of trigger.  She said \"I get it, and will continue to be aware of situations like this   wk of 7/31   cravings, warning signs, triggers: None this week   WK of 7/24-client denies triggers, cravings or warning signs this past week.  wk of 7/17:  cravings, warning signs, triggers:i went to a musical festival, had a few cravings, but also had insight when watching others use, \"glad it isn't me\"  Wk of 7/10 Had a PeTH test 7/7, see above. No issues. She discussed the Serenity Prayer and that she finds it helpful.  She also discussed having cravings at a 4 of 10 when cooking, and what she found helpful:  Processed, talked with , talk with group today about it.   Virginia said she really had quite a learning week with events in group, discussions and assignments.  \"My heart goes out to the new person the other day, but also I had some anger about all the disruptions and negativity from her\"  \"I am ready to move on, and learn what I learned, and know that could be any one of us\"  wk of 7/5  cravings, warning signs, triggers: none.  My adult kids are caring and asked if I thought I would be OK going to [facility], as there is drinking there.  She told them she appreciates the concern, but feels with them and little to no triggers, for quite some time, she didn't feel it was a problem.  Virginia said she had a wonderful time with her family and is not concerned about use over 4th of July holiday either, she has a good plan for spending time with family and focusing on good food.   wk of 6/26  Dim 5: cravings, warning signs, " "triggers: none.  Wed reading was focused blame.  It said blaming is hiding, that when we blame others we try to hide our character defects.  We discussed the meaning of character defects. \"I was in big denial for a long time, it took being told I would need a new liver for me to really realize what drinking was doing. \"it hit me like a ton of bricks when I finally really heard it\"  wk of 6/19  She participated in  Psychoeducation/Skills: Self-Care.  Client s completed self-care assessment and described self-care as feeling better physically. Client identified physical self-care they do well- attending medical appointments, one they do poorly or needs improvement- eating healthy food and barriers that prevent themselves from self-care - cost of food is expensive, the shopping and prep.  This topic will give a general overview of self-care: physical, environmental, emotional, social and spiritual. It will explore importance of self-care and the impact on recovery.   Wk of 6/12. Dim 5: warning signs, triggers and cravings: none   helpful coping: attending group. Examples of benefits of drinking or drug use, or other behaviors Physical, Social mental or emotional: confidence, escape from reality, \"friends\"     Main drawbacks connected to these: not real friends that only want to drink together.  Headaches and slurred speech.  Remoarse  Benefits connected to abstinence from addictive behaviors:more able to do me and be me, I enjoy trud friends, I remember things better and will have better memories      Drawbacks you see connected with abstinence from these:  Wk of6/6 she participated in the daily reading and said she liked it.  Virginia also gave meaningful feedback to client who discussed use episode: \"it could be anyone of us\"  She said this is important and she is learning from others sharing.  Week of 5/29/23: Client denies riggers or warning signs this past week. She said she liked the saying \"it is just as easy to " "create good habits as bad ones\"   Patient reports family members and her  are concerned about their substance use.  Patient reports their recovery goals are \"abstinence forever.\"  She has had no previous detoxes or treatments and needs to gain insight into healthy coping for relapse prevention.      Dimension 6: Recovery Environment -   Previous Dimension Ratin  Current Dimension Ratin  Family Involvement - not at this time  Summarize attendance at family groups and family sessions - NA  Family supportive of treatment?  Yes    Community support group attendance - no  Recreational activities - some camping, cooking and boating    Narrative -  wk of   She plans to continue meeting with her sober friend, and eventually begin going to AA regularly.  wk of .  RR changed to 1 she talked with her friend Chris  wk of 10/30, she said she met with her friend who has been going to .  My structure includes:  a trip to [place] for Mon's  birthday.  Meeting up with friends.  Time in the pool and a self care courtney day\"  Now that I have group 1 day per week, I will also talk with my friend in , Monday.      wk of 10/23  I met with a friend who has been sober 10 years.  wk of 10/16.  I find this group, my  and family, and many friends to be very supportive.  For structure and sober support for the weekend,  Virginia said she will be spending time with her family much of weekend, including a 2 year old bday party.  She said she is going to spend time journaling each day too.   wk of 10/9  5, but I am opening to this more and more\"  She participated in discussion on how to create healthy boundaries and was able to name physical boundaries like our body, personal space, sexual orientation, and privacy.   wk of 10/2  . Virginia  participated in discussion of how addiction affects relationships and said: things that are hard are  even more difficult\"   wk of : sober support motivation: 4 on likert scale " "1-low and 10 -high. She denies attending any sober support meetings. Client participated in an interactive educational session and learned about and discussed \"Community Based, Sober Support. this week. Client learned about 14 community based sober support meeting options and shared that she is interested in attending community based sober support meetings, that she only wants to attend the in person meetings not Zoom meetings. She shared after the discussions on the different types of sober support meetings that the one she \"liked the Rajendra and the 12 Steps\" meeting.     wk of 9/18  sober support motivation:  5, but I did make a call to a friend who has been sober 13 years and we are going to meet in a few weeks .   we discussed focused on self care over the next days and She intends to rest, babysit her grandkids with her  overnight, see her sister whom she has not seen for 4 months.     wk of 9/11 sober support motivation:  5, but I will look into SMART Recovery. She participated in Psychoeducation/Skills Goal Setting (RENEE)  This topic will give a general overview of short, intermediate and long term goals including the value of goal setting. She completed her goal setting worksheet and discussed things that can get in the way of her goal: others that suck energy out of her, old thinking about how nice a drink would be  Plan of structure for weekend: She said she will go camping with family this weekend in their new RV, she also has to rest a lot.  \"I love football and will watch most any game\"  absent wk o f9/4  wk of 8/28:\" I have a great deal going on with my upcoming surgery, will likely look into sober support when things settle down.  Right now my group is support\"  wk of 8/21 Client worked on her dimension 6 goals this week as she learned and discussed more about sober support. She continues to report a low desire to attend outside community based sober support meetings, however she does appear to " "be open to learning more about it.     wk of 8/14 Virginia discussed that she went to [place] with friends who were drinking, but that they all said they would not need to drink. She said she appreciated that but it really didn't bother her in the least, as she likes being sober. Group discussed this type of situation and some group members said it would have been hard for them. Virginia said if it ever is, she will change what she does or ask them not to use. \"I have very dear friends and I know they mean it when they say the don't need to drink around me.  sober support motivation: 5   8/7 \"I am at 5 on wanting to attend community support, but find group support very helpful!  wk of 7/31  sober support motivation:  I have so much going on with medical and this meeting 3x per week, that realistically, I will not likely look into community support until after my surgery at the end of the month.   WK of 7/24-client's support are her family, she is not attending support groups and motivation to attend is 2 on Likert scale. Client shared she has learned this week is this is a continuous journey and staying positive is huge. To support her sobriety this week she will continue to surround herself with good people. She is grateful for home, shelter and stability.  wk of 7/17:  sober support motivation: this group and my family.     wk of 7/10: \"I am motivated for outside support at 2/10, but when I get done with Tuesday group, I think my motivtiaon will be higher\"  wk of 7/5   We discussed \"The power of Solitude\" and how spending time alone with ourselves may not be easy or even desirable, it it is key to getting to know self.  I emphasized that studies tell us we need each other to survive and be happy, but also when we loose the ability to be alone with ourselves, sometimes our overstimulated nervous systems suffer from no place to rest and recharge.  She said this is good to discuss, as she may even consider doing it a bit more, " "as it \"really does recharge me\" She said she is pretty good about finding alone time and also uses journaling.  I asked client how this applies to recovery and treatment:She said she will often take a topic from group, or a question and journal about.  She said it really helps   Wk of 6/26. She said sober support motivation is  2, but I think motivation will grow as I keep settling in  I talked with clients about taking responsibility for our actions, so we can be empowered and own our life and also change if we need to. We discussed resources including AA, SMART Recovery.  I encouraged clients to start exploring meetings.  Wk of 6/19.  Virginia reports with having group 3x per week, it is a good deal of support, \" I will continue to learn about peer support\"  wk of 6/12client discussed resources sober support, including AA, Liver Transplant support and [place] recovery connection  wk of 6/6  goals for the wknd: look into volunteering at [facility] for writing card.  \"no community support attendance at this time\"   Client is not currently attending community sup[ort and does not have a sponsor. Client's supportive people are her family. Client shared she has friends and has not talked with many about her current situation, because \"I am a private person,\" (treatment and needing transplant).  Patient reports they are involved in community of holden activities    They reports spirituality impacts recovery in the following ways:  \"There was some changes as far as .  We do belong to a [employer] but it's been a number of years since we've been.\"  Patient believes her spirituality and sobriety are connected. Patient reported having 3 children, all adults.  Patient has 2 grandchildren.    She lives with her spouse and son. recreational/leisure activities: camping, cooking, boating, playing board games and card games.  Patient is currently disabled due to her Crohn's Disease.  Patient reports their income is obtained through SSDI " "disability; spouse.  Patient does not identify finances as a current stressor.         Progress made on transition planning goals: client completing tx.    Justification for Continued Treatment at this Level of Care:   She reports she wants sobriety for her life and has completed A plan that will help her do this. Along with all her assignments and structure, she has completed a Recovery Care Plan. She has  learned long term relapse prevention coping skills, she reports finding that attending group is helpful for her and she is learning but she is feeling close to ready to be completed   She said she did not think she would get much out of this but is getting so much support and understanding of healthy coping.  She is not attending community support, at this time, \"but has begun to meet with a person she knows who has been sober 10 years.She has resources and encouragement to attend Gather or AA or other community support.  Virginia continues to discuss gratitude for sobriety and meeting her group members, she intends to stay in touch with her group members and they have planned a sober outing already    Treatment coordination activities this week:wk of 11/15, Dr. De La Rosa attested Tx plan as client was discharging.   week of 11/8, coordinated information with Social Yimi worker, regarding [place]'s progress  wk of 9/25 staffed with SOLANGE Ponce. wk 9/18 social goran Yang on LT asked her time frame to be done, since [place] just had surgery and there is no issue on the horizon about getting a new liver   wk 8/28 Reviewed notes from sub Galen Rowland  7/31 reviewed Onelia Singh notes and discussed client with her.  7/18  Discussed client info with sub Onelia Singh   Discussed client info with margaret Singh  Need for peer recovery support referral? No    Discharge Planning: Yes, see AC plan and notes on her Recovery care plan      Has vulnerable adult status change? No    Interdisciplinary Clinical Supervision " including:  no    Are Treatment Plan goals/objectives effective? Yes  *If no, list changes to treatment plan:    Are the current goals meeting client's needs? Yes  *If no, list the changes to treatment plan.  Plan:  Continue per Master Treatment Plan    *Client agrees with any changes to the treatment plan: Yes  *Client received copy of changes: Yes  *Client is aware of right to access a treatment plan review: Yes     Staff Members Contributing To Weekly Review:      Judi Dodd, MS, LADC, LPC

## 2023-11-15 ENCOUNTER — HOSPITAL ENCOUNTER (OUTPATIENT)
Dept: BEHAVIORAL HEALTH | Facility: CLINIC | Age: 58
Discharge: HOME | End: 2023-11-15
Attending: FAMILY MEDICINE
Payer: COMMERCIAL

## 2023-11-15 PROCEDURE — H2035 A/D TX PROGRAM, PER HOUR: HCPCS

## 2023-11-15 PROCEDURE — H2035 A/D TX PROGRAM, PER HOUR: HCPCS | Mod: HQ

## 2023-11-15 NOTE — GROUP NOTE
"Group Therapy Documentation    PATIENT'S NAME: Abiola Matute  MRN:   [id number]  :   1965  ACCT. NUMBER: [id number]  DATE OF SERVICE: 11/15/23  START TIME: 12:00 PM  END TIME:  2:00 PM  FACILITATOR(S): Judi Dodd LADC  TOPIC: BEH Group Therapy  Number of patients attending the group:  5  Group Length:  2 Hours    Group Therapy Type: Addiction and Psychoeducation    Summary of Group / Topics Discussed:    Balanced Lifestyle , Boundaries, Coping Skills/Lifestyle Managemet, Choices in Recovery, Leisure Exploration/Use of Leisure Time, Meditation/Breathing Exercises, and Self-Care Activities      Attestation:   Dr. Rj MD - Provides oversight and supervision of care.     Today   We checked in on anything clients wanted to review or discuss about Monday group.   Many client discussed the poem from last week  \"The Gal in the Glass\" and said they are working on spending time looking at self in mirror, not judging and some are even using affirmations.  Most discussed how difficult it is because they criticize self. I encouraged them to continue, as like anything the more they do it, the more they might find it easier.    I used this to lead into discussion of holding on to things that are not in our best interest, and then we discussed how and what things this includes with addiction .   A group member from  3 was completing her time today and since she is the only PH 3 person she wanted to do her completion with other group members. She did her Recovery care plan and got feedback.  She also wrote and shared a \"goodbye to alcohol\" letter.   I read a book called \"When the Last [employer] [facility]\" and group shared what they got out of it, We did a short meditation at the end of group.  Group participated in a brief discussion on importance of sober community support and structure and they discussed their structure for the next few days.  -    Group Attendance:  Attended group session  Patient's response to " the group topic/interactions:  cooperative with task, discussed personal experience with topic, expressed readiness to alter behaviors, expressed understanding of topic, and gave appropriate feedback to peers  Patient appeared to be Actively participating, Attentive, and Engaged.        Clients specifice information :  Today's session focused on dim 5 Recovery Care Plan, Goodbye letter and dim 3 meditation, self love and dim 6 sober support and structure,   [place] joined  2 today as she is the only person in  3 and wanted to join group, rather than just do an individual session, as well as complete her last day in treatment.  She did do her assignment:  Recovery Care plan and it seemed very well thought out.  She included such things as working on setting good boundaries, meeting with her sober friend and attending AA, continuing sober support with her group members, and working with her medical team.  She reports wanting a life of sobriety for herself, one day at a time.  She shared her Goodbye letter to alcohol.  Group members all seemed to take a big breath as it was powerful. They shared how much it meant an dhow much they learned from [place].  Group members wished her well and those that hadn't exchanged numbers now did.  She said she is feeling ready to complete group, not that she wants to as she will miss the structure and group support, but she said she hopes to stay in contact with group members and she is building her outside support.  She thanked this counselor and teared up saying goodbye to group, as well as when she shared her letter    P) Discharge from Group, follow AC plan and Recovery care plan.     Judi Dodd, MS, LADC, LPC    Attestation:  Dr. Rj CHEN - Provides oversight and supervision of care.

## 2023-11-15 NOTE — PROGRESS NOTES
Attestation:   Dr. De La Rosa - Medical Director - Provides oversight and supervision of care.   INDIVIDUAL SESSION  Start 11:17  END  11:50    [place]ia attended an individual session today. We discussed her health, and completed her Tx plan, as she is completing tx plan today.  [place]ia has completed all goals.  We also went over her Aftercare plan, which included time with sober people, daily reflection and continued interaction with her medical team, and she signed this and tx plan.  We discussed her Balance and recovery plan assignment.   [place]ia said family time is one of the most important things to he and she is learning she needs more time alone than she realized.   [place]ia said she sometimes has worries in her life, but overall feels blessed to not have a lot of anxiety or depression but does not/will not hesitate to seek counseling if needed.  She completed TAINA-2, scoring 0, PHQ-1, scoring 0 and PROMIS scoring 32.      I: PHQ-2, TAINA-2, PROMIS, Balance assignment discussion,  Encouragement, feedback, goal planning and completion, Aftercare plan    A) she seems to have good structure and plan as she completes treatment.  iVrginia sees hope and a future in [facility].    P) Continue to work with Health Team. Follow Aftercare plan, continue to add to Balance assignment.  Discharge from tx.    Judi Dodd, MS, LADC, LPC

## 2023-11-15 NOTE — PROGRESS NOTES
[facility]  Adult Substance Use Disorder Program  Treatment Plan Requirements      Attestation: Dr. Chris Juares - Provides oversight and supervision of care     These services are provided by the facility for each patient/client according to the individual's treatment plan:  Individual and group counseling  Education  Transition services  Services to address any co-occurring mental illness  Service coordination     Criteria for discharge:  Patients/clients are discharged from the program following completion of the entire program including all phases of treatment or acceptance of other post-treatment referrals such as sober supportive living, or aftercare at other facilities.  Patients/clients may also be discharged for inappropriate behavior or substance use.     Favorable Discharge - Patients/clients have completed agreed upon treatment goals, understand their diagnosis and appear motivated for continued recovery.  Guarded Discharge - Patients/clients have demonstrated some understanding of their diagnosis and recovery process, and have completed some of their treatment goals.  This prognosis also includes patients/clients who have completed some treatment goals but have not made commitment to community support or follow through with referrals.  Unfavorable Discharge - Patients/clients have not completed agreed upon treatment goals due to their own choice, have limited understanding of their diagnosis, and have shown minimal or inconsistent behavior conducive to recovery.  Those patients/clients discharged due to behavioral problems will also be unfavorable discharges.     Adult RENEE Treatment Plan                                 Patient Name:  Juju ESCALANTE  MRN: [id number]  :[birth year]   Identified Gender: female  Admit Date: /  Current Treatment Phase: OP  Last Updated: 11/15     SUBSTANCE USE DISORDER(S): Alcohol Use Disorder Severe, 303.90 (F10.20), in  "early remission        Dimension 1: Acute Intoxication/Withdrawal Potential, Risk Level: 0     Needs identified from Comprehensive Assessment Summary: The pt reports her last use of alcohol was 12/29/22  Update: 11/15  no changes     Date of last use: 12/29/22  Patient currently receiving medication assisted therapy (MAT): No  Current or recent withdrawal symptoms: No     Focus area: Client is attending OP program to work on sobriety/abstinence in order to be eligible for a liver transplant  Date Assigned: 5/30/23  Clinical Goal: remain abstinent throughout your OP tx or report any use to this counselor and group  Clients Goal:  \"I intend to continue being sober\"  Goal needs to be completed prior to discharge? [x]  Treatment Strategies:   Report any (1 or more) substance use to counselor to determine any changes that need to be made to plan  Target Date:11/22/23  Date Completed: 11/15           Dimension 2: Biomedical Conditions/Complications, Risk Level: 3     Needs identified from Comprehensive Assessment Summary: She is being considered for a potential liver transplant.  She has cancer of the liver and also has Crohns disease.    Her PCP is Linda Macdonald and she also works closely with Dr. Jeannette Cervantes and Dr. Syed  Update: 11/15           Focus area: Client is working with Liver transplant team, on his weight loss and will get PeTH test as recommended  Clinical Goal: Continue to follow recommendations of Medical team and report any changes that affect your treatment  Patient Goal:  \"I will continue to work with my care team, to ensure success and I will report anything that effects my treatment\"    Goal needs to be completed prior to discharge? []  Methods/Strategies (must include amount and frequency):   1. [place] will report any changes (1x or more) to physical health to counselor.   2. [place] will attend all scheduled doctor's appointments and continue to discuss health issues and any weight changes   3. [place] " will take medications as prescribed and report any (1x or more) changes  4. Take PEth test and Drug screen test as recommended 1x or more                                                                                                Target Date: 11/22/23            10/25 She has been doing all 4 of these and should continue     Completion Date:11/15        Dimension 3: Emotional/Behavioral/Cognitive, Risk Level: 1     Needs identified from Comprehensive Assessment Summary:  At SI her PHQ-2 score was 0 and his TAINA-2  score was 1.  PROMISE 10 was 31.    She reports no previous mental health diagnoses, but said she has some anxiety around her diagnosis     Update: 11/15       Focus area: Client reports sometimes getting anxious, especially with things like starting a new group or around her diagnosis  Clinical Goal:Gain insight into healthy coping for symptoms of anxiety   Patient Goal:            Goal needs to be completed prior to discharge? []  Methods/Strategies (must include amount and frequency):    1. [place] will attend 6 or MH skills group sesssions with Darinel Au  Target Date: 7/ 30/23  Completion Date: 7/15  3: Assignment(s):   Discussion of emotions and healthy coping, assignment 1x Did   participate in discussion of mindfulness -   Participated in 2 or more mindfulness meditations    did wk of 7/12m 8/30,   Target Date: 9/30  Completion Date: 8/30,   she wants to continue working on mindfulness/meditation and healthy coping for anxiety, date extended  Participate in 2 or more meditations  11/1 11/8, 11/15  Healthy coping for anxiety 1x          11/1  Target Date: 11/30  Completion Date: 11/8        Dimension 4: Readiness to Change, Risk Level: 0     Needs identified from Comprehensive Assessment Summary: Although [place] needs treatment due to recommendations of LT team, she reports she also wants to learn about healthy coping.    Update: 11/15  no changes     Focus area: Client may have external  "motivation for sobriety, due to recommendations by LT franniem, he may find it helpful to learn about the disease and internal motivation    Clinical Goal:Enhance understanding of the disease and motivation for ongoing sobriety  Patient Goal: \"I am open to learning about this disease\"  Goal needs to be completed prior to discharge? []  Methods/Strategies (must include amount and frequency):   1.[place] will attend 3, 2-hour groups per week. Days/Times: Monday, Tuesday and Wed  After completion of  6-7 weeks of MH group Client will attend 2-2 hour groups per week on Mondays and Wednesdays   2.[place] will contact staff if unable to attend at [phone].  Target Date:6/15  Completion Date:   Assignments:    \"I am your disease\" 1x      did 10/9  Consequences of Use assignment 1x or more   did 10/23  Target Date:10/30  Completion Date: 10/23        Dimension 5: Relapse/Continued Use/Continued Problem Potential, Risk Level: 1     Needs identified from Comprehensive Assessment Summary: [place] has had no previous treatments or detoxifications.  She needs to gain insight and understanding into warning signs, triggers and healthy coping for lifelong sobriety.   Update: 11/15       Focus area: Gain insight and coping tools for  relapse prevention, she wants ongoing sobriety   Clinical Goal:  Gain insight into and develop  coping skills and relapse prevention strategies to utilize when experiencing trigger, cravings and or urges  Client Goal: \"I cannot drink with a transplant or to get a transplant, I am willing to learn all I can\"  Goal needs to be completed prior to discharge? []  Methods/Strategies (must include amount and frequency):   1.[place] will share during each session's check-in any urges and addictive thinking to better understand their pattern of use and to prevent return to use.  2 Assignment(s):   List warning signs and triggers, share your top 5 and healthy coping strategies 1x     did 9/18  Urge surfing for cravings 1x   " "did     Target Date: 5/15  Completion Date: 5/15        Focus area: Gain insight and coping tools for  relapse prevention, she wants ongoing sobriety after tx completion  Clinical Goal:  Develop  coping skills and relapse prevention strategies to utilize when experiencing trigger, cravings and or urges  Client Goal: I have learned so much and am committed to ongoing sobriety.   Goal needs to be completed prior to discharge? []  Methods/Strategies (must include amount and frequency):   1.[place] will share during each session's check-in any urges and addictive thinking to better understand their pattern of use and to prevent return to use.  2 Assignment(s):   \"What my relapse looks like (1x)         11/8    Recovery Care Plan  1x                      11/15     Target Date: 11/22  Completion Date: 11/15        Dimension 6: Recovery Environment, Risk Level: 1       Needs identified from Comprehensive Assessment Summary: she has supportive family and is involved in activities, but does not attend any community support     Update: 11/15      Focus area/need:[place] is not involved in peer support and does have some structure,but wants to continue to work on balance and structure  Clinical Goal: gain information on resources and  expand structure and support in your life  Client Goal: \"I don't know much about building support\"  Goal needs to be completed prior to discharge? []  Methods/Strategies (must include amount and frequency):   1. Receive 2 or more resource handouts on community support and discuss 2 or more times        did 9/25  . Assignment(s):  Goal setting assignment:                   did   8/4  Finding Balance in recovery 1x          11/15  Attend individual meeting with sober friend Chris, report to group      11/8  Target Date: 11/20  Completion Date: 11/15              Resources  Resources to which the patient is being referred for problems when problems are to be addressed concurrently by another provider: No " Referrals Needed      [x] Contact insurance to ensure referrals are in network     Vulnerable Adult Review   [X] Review of the facility Abuse Prevention plan was reviewed with the patient   [X] No individual abuse plan is necessary   [ ] In addition to the facility Abuse Prevention plan, an Individual Abuse Plan will be put in place      [place] attests their date of last use as 12/29/22 . She attests they have participated in the creation of this treatment plan. Client has been provided a copy of this treatment plan and is in agreement with how this plan is written and will be stored in the electronic record.                                                                                                  Patient Signature: _________________________________           Date: _________     Counselor Signature: ______________________________            Date: _________

## 2023-11-15 NOTE — PROGRESS NOTES
Adult Mental Health Intensive Outpatient Discharge Summary/Instructions      Patient: Abiola Matute MRN: [id number]   : 1965 Age: 58 year old Sex: female     Admission Date: 23  Discharge Date: 11/15/23  Diagnosis: Alcohol Use Disorder, in remission    Focus of Treatment / Progress    Personal Safety: Call National Suicide hotline if needed    988     * Follow your safety plan     * Call crisis lines as needed:    [facility] [phone] [facility] [phone]  [facility] [phone] Crisis Connection [phone]  [facility] [phone] [facility] [phone]  [facility] [phone] National Suicide Prevention [phone]  [facility] [phone] Suicide Prevention [phone]  [facility] [phone]    Managing symptoms of:  Substance abuse    Community support/health:  as needed, and recommended to find a support group    Managing Symptoms and Preventing Relapse    * Go to all of your appointments    * Take all medications as directed.      * Carry a current list if medication with you    * Do not use illicit (street) drugs.  Avoid alcohol    * Report these symptoms to your care team. These are early signs of relapse:   Thoughts of suicide   Losing more sleep   Increased confusion   Mood getting worse   Feeling more aggressive       *Use these skills daily:  daily meditation or reflection, check in with feelings, abstinence    Copy of summary sent to: ESTHER    Follow up with psychiatrist / main caregiver: ESTHER   Next visit: ESTHER    Follow up with your therapist: ESTHER  Next visit:ESTHER    Go to group therapy and / or support groups at: [facility]    See your medical doctor about:  Anything medically related.  Continue contact with your Liver Support Team and follow recommendations.    Other:  Have fun, enjoy sobriety, daily awareness of feelings and use healthy coping        Client Signature:_______________________  Date / Time:___________  Staff  Signature:________________________   Date / Time:___________

## 2023-11-16 NOTE — ADDENDUM NOTE
Encounter addended by: Judi Dodd LADC on: 11/16/2023 10:23 AM   Actions taken: Charge Capture section accepted

## 2023-11-16 NOTE — PROGRESS NOTES
"       IOP DISCHARGE SUMMARY                                                                                                            Name:  Abiola Matute  \"[place]\"   :  SOLANGE Yu   Admit Date: 2023   Discharge Date: 11/15/23   :  1965   Hours Completed:  97   Initial Diagnosis:  303.90 (F10.20) Alcohol Use Disorder Severe, in early remission,    Final Diagnosis:  303.90 (F10.20) Alcohol Use Disorder Severe, in remission   Discharge Address:    [address] [place] [address], [place] [address]   Funding Source:    Insurance     Program:  [facility], Older Adult OP program    Discharge Type:  WSA- With Staff Approval , client completed program    Patient was receiving residential services at the time of discharge:   NO      Reasons for and circumstances of service termination:  Client completed program       If program discharge status was At Staff Request, the license garcia must identify the following:    Other interested parties conferred with: not applicable    Referrals provided: not applicable    Alternatives considered and attempted before deciding to discharge:  not applicable      Dimension/Course of Treatment/Individualized Care:   1.  Withdrawal Potential - Intake Risk level -  0 Discharge Risk level -0  Narrative supporting risk description:  Client reports last use of alcohol as 21 and has shown no signs of use or withdrawal since attending tx starting 2023    Treatment plan goals and progress towards those goals:  Client gaol was to remain abstinent throughtout OP treatment.  Client reports no change in sobriety date and exhibited no signs of intoxication or withdrawal. She  worked with Liver transplant team and did UA's, Peth tests, as asked.  Had a PeTH test , see above. No issue    2.  Biomedical Conditions and Complications - Intake Risk level - 3 Discharge Risk level - 1  Narrative supporting risk description: Virginia entered group " "with the thought she would need a new liver, but her Drs. Were able to do surgery and at this time her cancer is gone.  Due to the damage of liver, she may still need a liver in the future,, but at this time she is dealing well with this and with her Crohns.  She reports she continues to work with PCP and other members of her care team .    Treatment plan goals and progress towards those goals:  Virginia reported having Cancer and Crohns disease when beginning treatment and was working with Liver Transplant Team and when starting tx was still looking at possibility of needing liver.  She had a liver tumor and ended up needing surgery, at this time she continues to work with team, but is not in need of a liver, they will continue to monitor. She reported any changes in health throughout treatment.  She reports he takes all medications as directed.  She had many specialists she worked with.  She reports having good access to care and her PCP is Linda Macdonald.       3.  Emotional/Behavioral/Cognitive Conditions and Complications - Intake Risk level -  1 Discharge Risk level - 0  Narrative supporting risk description: Virginia had reported no previous MH diagnoses. Each week she would report in on Anxiety, stress and depression.    She also took Phq-2, TAINA-2 and PROMISE regularly and discussed health coping for any issue which arose.  She reported some anxiety symptoms, mostly related to health, but continued to gain and use tools learned.    Treatment plan goals and progress towards those goals:   She completed PHQ-2, TAINA-2, and PROMISE, regularly, and discussed helpful coping for anxiety, depression and stress symptoms.   She said she sometimes has worries in her life, but overall feels blessed to not have a lot of anxiety or depression.   she participated in psychoeducation on \"Understanding my anxiety\".   She participated in discussion on how to \"change the channel\" when getting into an anxiety loop. \"It is a simple way to " "do something more productive. Virginia actively participated in discussion on thought distortions and discussed which ones are the ones she actively wants to focus on for the time being: catastrophising and prediction.   She said that she has been continuing to gain awareness of her beliefs vs perceptions vs what is a thought and what is a feeling and this awareness is helpful for her.  Client participated in lovingkindness meditation 3x and said it was just perfect during one occasion. She participated in 5 senses psychoeducation discussion, participated in guided practice and was able to name 5 parts, and said she feels it will be a helpful tool.  She discussed  two situations where the techniques could be helpful, including: Should's and musts and not setting up unrealistic expectations of how she or life should be.  I want to remind myself \"until someone has walked a mile in my shoes, they don't get to  me and the same goes for me with them.  Virginia actively participated in meditation and said it was good to take that time to be.  \"I liked it\"  She  participated in mindfulness vs mindlessness discussion and received a handout.   She participated in a \"Private Garden\" guided visualization which she described as helpful.   She also completed  Healthy coping for anxiety .  She said her anxiety has gone way down but reading this article and having 10 things from the list she will use as regulars is helpful.  She said she is learning how boundary setting is helping with unneeded anxiety in some instances     Virginia was able to teach back the techniques discussed on disputing negative thoughts from the positive psychology information.  She said it was helpful   She participated in discussion on radical acceptance and also gave and received feedback well from group today. \"I appreciate feedback\"    She participated in psychoeducation on  Distress Tolerance-Radical Acceptance   This topic will focus on skills designed to " "keep our pain from turning into unnecessary suffering.  Virginia received handout and participated in discussion on various strategies to effectively manage distressing situations and said this was a helpful discussion.  Client was able to teach back what radical acceptance means and said \"this is good stuff\"      4.  Readiness for Change - Intake Risk level -  1  Discharge Risk level - 0  Narrative supporting risk description:  When first entering tx she knew she needed sobriety for her possible liver transplant, but as time went on she gained motivation for sobriety for many other reasons.  She was always optimistic and grateful.    Treatment plan goals and progress towards those goals:  She regularly reported 10/10 on motivation for ongoing sobriety, in weekly check ins. She also continued to gain awareneness of her consequences, and more so how her life was bettered when not using alcohol  We discussed her Consequences of Use assignment.  She named her health as the biggest consequence but also her family was worried, and she did not know a lot of this until close to starting tx.  She said another consequence \"I am much more clear, not \"hangover or foggy brain\" She said the discussions on radical acceptance are helping her grow her awareness of loving self better, and how this and recovery can lead to more quality.  Recently, toward the end of treatment, she answered: What I want for myself and sobriety: increased self worth.  What I am willing to do to get it: self reflect, listen, not drink.  what I am willit to do with my treatment: complete it, be honest and open minded.  What I like about treatment and group:   honesty, safety.  What I want in the future from group: stay in touch with members, use humor and seriousness/  what I am willing to give to group members is:  humor, feedback, non judgement  and on scale of 1-10, how much effort and time have I put into my recovery:10  Client said of powerlessness and " "unmanagability:  \"I do think I am powerless over alcohol\"  \"I also think I have power in choosing not to drink.   Virginia said \"I am your disease\" made her tear up and it is awakening\"  she said she is so much more accepting of her disease and not drinking, but if sh e wasn't this would sure be a motivator\"   she participated in psychoeducation on Readiness to change (stages of change).  This topic gave a general overview of stage of change models and how they apply to the personal experience of each patient.  She participated in discussion, received handout on stages and also listed which stage she was in with: Tx: action   sobriety/abstinence:  action    sober support: determination      community support:  contemplation     my individual goal: action .  She readi  \"I am your disease\" and she said \"it is so powerful\"        5.  Relapse/Continued Use/Continued Problem Potential - Intake Risk level -  2 Discharge Risk level - 0  Narrative supporting risk description:  She had no previous treatments and needed to gain insight into ongoing relapse prevention.  She gained insight and tools.    Treatment plan goals and progress towards those goals: Each week at check in or as needed she discussed any triggers, warning signs or cravings she had and she or group would discuss healthy coping.   Some of these included:  A trigger may be meeting with my brother in law, at my Mom's birthday,.we have severed ties, but due to it jacky 90th birthday all family will be there.  She has a plan for exiting if needed and will be \"staying as far away as possible\"   She said she does have some cravings and triggers, but feels confidnet in her tools and support.  \"I above all remember to take a step back, take a breath and think about risk vs rewards of using\"   She reports having craving of 1 when having dinner with friends. \"I ordered a club soda, and indulged in conversation. She reports cravings at 1 of 10 triggered by watching football " "with drinking friends. She reports dealing with cravings by getting away from the group for a short time by taking a walk outdoors.   Cient discussed a big warning sign for self is thinking I can drink again, especially socially.  II think about  it regularly.  Group discussed that awareness of this is good and it is likely normal to do this.  I encouraged her to continue to discuss this and down the road to keep in mind with sponsor, therapist, etc. And work through it.  I told her it is really good she can say this outloud. Something I am doing well: having plans of structure, especially when around situations I used to drink in  She also discussed having cravings at a 4 of 10 when cooking, and what she found helpful:  Processed, talked with , talk with group today about it.     She did do her assignment:  \"What My Relapse Looks Like\": She said \"it was such a tough assignment, but I get why you have us do it\" She told group she was in tears looking at the reality of relapse, but it even gave her more motivation to continue on the path for sobriety\"  Group told her how powerful her sharing this was.   Client was able to teach back information for urge surfing and said this is a good tool.  She was congratulated by writer and group peers for having obtained over 9 months of sobriety   She participated in Psychoeducation/Skills Relapse Prevention (RENEE).  This topic gave a general overview of basic relapse prevention, including definitions of warning signs, triggers and cravings and the importance of addressing healthy coping skills for ongoing relapse prevention.Patient was able to  name 4 of their warning signs and triggers, including:  Too much time with others whom don't like to stop drinking, concerts/shows if I don't have a plan  Patient was able to name 4 healthy coping mechanisms for dealing with their warning signs and triggers: including: making a plan of structure  Sights, sounds and situations that " "are triggers : concerts and comedy shows, but support, discussion and a fun NA drink are how I deal with it.   Virginia said each day she is healing more from surgery but is still quite sore.  \"I have had absolutely no cravings or desire to use alcohol\".   She participated in  Psychoeducation/Skills: Self-Care.  Client s completed self-care assessment and described self-care as feeling better physically. Client identified physical self-care they do well- attending medical appointments, one they do poorly or needs improvement- eating healthy food and barriers that prevent themselves from self-care - cost of food is expensive, the shopping and prep.  This topic gave a general overview of self-care: physical, environmental, emotional, social and spiritual. It will explore importance of self-care and the impact on recovery.   On her day of completion, Virginia did a balance exercise and said time with family is her most important thing for balance, but that is is realizing she needs more balance with time alone, also.   6.  Recovery Environment - Intake Risk level -  2 Discharge Risk level - 0  Narrative supporting risk description:  Client reported she had a supportive family and was involved in activities and structure, but did not attend community support    Treatment plan goals and progress towards those goals:She received resources on community support and and discussed regularly in group.  Discussions included   discussing AA, Liver Transplant support and [place] recovery connection.  Another time we discussed AA, SMART Recovery and Helath Realization.  She received many handouts on various resources.   In her last week she said plans to continue meeting with her sober friend, and eventually begin going to AA regularly.  Many weeks of treatment Virginia would report on her structure for the following few days.  Some of these included: will be spending time with her family much of weekend, including a 2 year old b-day party.  She " "said she is going to spend time journaling each day too.  will go camping with family this weekend in their new RV, she also has to rest a lot.  \"I love football and will watch most any game\"   We discussed \"The power of Solitude\" and how spending time alone with self may not be easy or even desirable, it it is key to getting to know self.  I emphasized that studies tell us we need each other to survive and be happy, but also when we loose the ability to be alone with ourselves, sometimes our overstimulated nervous systems suffer from no place to rest and recharge.  She said this is good to discuss, as she may even consider doing it a bit more, as it \"really does recharge me\" She said she is pretty good about finding alone time and also uses journaling.  She was willing to talk about support and on one occasion, Virginia discussed that she went to [place] with friends who were drinking, but that they all said they would not need to drink. She said she appreciated that but it really didn't bother her in the least, as she likes being sober.   She participated in Psychoeducation/Skills Goal Setting (RENEE).  This topic gave a general overview of short, intermediate and long term goals including the value of goal setting. She completed her goal setting worksheet and discussed things that can get in the way of her goal: others that suck energy out of her, old thinking about how nice a drink would be.   She participated in discussion on how to create healthy boundaries and was able to name physical boundaries like our body, personal space, sexual orientation, and privacy.    Strengths: Virginia attended Group regularly, and she contributed meaningfully to group discussions.  He completed goals and assignments.  Ashvin gave and received feedback well.  Needs: She reports good connections with family and has been talking with a sober friend but lacks a regular community support meeting.   Services Provided: Intake, assessment, treatment " planning, education, group discussion,  lectures, 1x1 therapy, and recommendations at discharge.      Program Involvement: excellent  Attendance: excellent  Ability to relate in group/ excellent  Other program activities: excellent  Assignment Completion: excellent  Overall Behavior: excellent  Reported Family/Significant   Other Involvement: None     Prognosis: Favorable.     Focus of Treatment / Discharge Recommendations    Personal Safety/ Management of Symptoms    * Follow your safety plan.  Report increased symptoms to your care team and /or go to the nearest Emergency Department.    * Call crisis lines as needed    [facility] [phone]                [facility]  [phone]  [facility] [phone]     [facility] [phone]                [facility] [phone]             [facility] [phone]  [facility] [phone]           National Suicide Prevention  [address]  [facility] [phone]                  [facility] [phone]  Suicide Prevention [phone]  Throughout  [place]: call **CRISIS (**867063)  Crisis Text Line: is available 24/7 by texting [place] to 615082      Abstinence/Relapse Prevention  * Take all medicines as directed.  Carry a current list of medicines with you.  * Use coping skills: Daily meditation and focused breath, structure in your days  * Do not use illicit (street) drugs, controlled substances (narcotics) or alcohol.    Develop/Improve Independent Living/Socialization Skills: ESTHER    Community Resources/Supports and Discharge Planning:  He meets with sober friends regularly, many in recovery themselves.    Follow up with psychiatrist / main caregiver: ESTHER   Next visit: ESTHER    Follow up with your therapist: ESTHER  Next visit:ESTHER    Go to group therapy and / or support groups at: ESTHER    See your medical doctor about:  Anything medically related.  Continue contact with your Liver Support Team and follow recommendations.    Other:  Have fun, enjoy  sobriety, daily awareness of feelings and use healthy coping    Counselor Name and Title:  SOLANGE Yu

## 2023-11-17 ENCOUNTER — TELEPHONE (OUTPATIENT)
Dept: BEHAVIORAL HEALTH | Facility: CLINIC | Age: 58
End: 2023-11-17
Payer: MEDICARE

## 2023-11-17 NOTE — TELEPHONE ENCOUNTER
----- Message from SOLANGE Grimaldo sent at 11/16/2023  3:45 PM CST -----  Regarding: remove remaining sessions she has completed Tx    Scheduling Request    please remove remaining sessions she has completed Tx    Patient Name:STEVEN MCNAMARA [[id number]]  Location of programming: [place]    Group: [id number] on Wednesday at 9:00 to 11:00                Attending Provider (MD): Dr. De La Rosa      Visit type:  VIDEO      Billing Type: part of program    Thank You,  Alana Dodd M.S., SOLANGE, LPC  Lead Counselor  [phone]

## 2023-11-28 ENCOUNTER — COMMITTEE REVIEW (OUTPATIENT)
Dept: TRANSPLANT | Facility: CLINIC | Age: 58
End: 2023-11-28
Payer: MEDICARE

## 2023-11-28 NOTE — COMMITTEE REVIEW
Abdominal Committee Review Note     Evaluation Date: 2/14/2023  Committee Review Date: 11/28/2023    Organ being evaluated for: Liver    Transplant Phase: Evaluation  Transplant Status: Active    Transplant Coordinator: Fabio Antunez  Transplant Surgeon:   Pravin Ball    Referring Physician: Farhan Schilling    Primary Diagnosis: Acute Alcohol-Associated Hepatitis With or Without Cirrhosis  Secondary Diagnosis: Primary Liver Malignancy: Hepatoma (HCC) and Cirrhosis    Committee Review Members:  Nutrition Amber Ortega, JENN   Pharmacist Candy Whitfield, [facility]    - Clinical Angel High, RODGER, Celia Quiñonez, [facility]   Transplant Tyra Marion, RN, Zayra Paulino, RN, Jr Gm Reyes, RN, Fabio Antunez, RN, Cori Bauman RN   Transplant Hepatology  Lisa Messina MD, Johan Ross MD, Jeannette Cervantes MD, Francisco Giron MD, Thomas M. Leventhal, MD, Jovita Fuller MD   Transplant Surgery Tejal Gonzalez NP, Lacy Mckinnon NP, Horacio Gandhi MD, Pravin Ball MD, Benedicto Elizalde MD       Transplant Eligibility: Cirrhosis with MELD, ETOH, HCC    Committee Review Decision: Approved    Relative Contraindications:     Absolute Contraindications:     Committee Chair Francisco Garcia MD verbally attested to the committee's decision.    Committee Discussion Details: Discussion: Approved pending IBD follow-up/assessment.

## 2023-11-29 ENCOUNTER — DOCUMENTATION ONLY (OUTPATIENT)
Dept: GASTROENTEROLOGY | Facility: CLINIC | Age: 58
End: 2023-11-29
Payer: MEDICARE

## 2023-11-29 ENCOUNTER — TELEPHONE (OUTPATIENT)
Dept: GASTROENTEROLOGY | Facility: CLINIC | Age: 58
End: 2023-11-29

## 2023-11-29 NOTE — TELEPHONE ENCOUNTER
Hello Team,  Please schedule pt with Dr. Fuller in -person appt on 1/30/24 at 1:40 pm for IBD RTN.    Please let RN know when completed.    Thank you.  Remi

## 2023-11-29 NOTE — TELEPHONE ENCOUNTER
Dr. Fuller,  Spoke with Virginia with an update.    Pt states she has been having insurance issues and that is why she had not done any labs or made an appt with us.    Pt is hoping to get this resolve and changing her insurance to Medicare as of 1/1/24.    Good news. She is cancer free and will not need a liver transplant. Pt states she is not even on the list anymore.    I have put her on your schedule for 1/30/24 at 1:40pm.    She will get the lab done prior to her visit with you.    Thank you.  Remi

## 2023-11-29 NOTE — PROGRESS NOTES
Stool Kit (Fecal bossman ordered by Dr. Fuller) mailed to Pt via [employer].    On 11/29/2023      Nancy Leonard

## 2023-11-29 NOTE — TELEPHONE ENCOUNTER
----- Message from Jovita Fuller MD sent at 11/28/2023  3:12 PM CST -----  Justin Khalil,    Could you schedule an asap appt within 2-4 weeks for this patient? Can be with Dr. Diaz or Dr. Tipton if I don't have any opening. Dr. Cervantes, her transplant hepatologist would like her to be seen for IBD prior to listing her for liver transplant.     I recall that I asked for a sooner appt with overbook for her a month ago. Also, I don't see a fecal protectin coming back. What happened?     Thank you!     Jovita

## 2023-12-04 DIAGNOSIS — K50.113 CROHN'S DISEASE OF LARGE INTESTINE WITH FISTULA (H): Primary | ICD-10-CM

## 2023-12-04 NOTE — TELEPHONE ENCOUNTER
DATE OF PROCEDURE:  04/16/2020

 

PROCEDURE:  Right internal jugular central line.

 

PREOPERATIVE DIAGNOSIS:

 

POSTOPERATIVE DIAGNOSIS:

 

PROCEDURE :  Benito King DO

 

ATTENDING PHYSICIAN:  Rishabh Manning DO, was in attendance.

 

INDICATION:  Hypotension.

 

CONSENT:  Was obtained from the patient prior to the procedure.  Indications,

risks, and benefits were explained at length.

 

PROCEDURE SUMMARY:  A time-out was performed.  My hands were washed immediately

prior to the procedure.  I wore a surgical cap, mask with protective eyewear,

full gown, and sterile gloves throughout the procedure.  The patient was placed

in Trendelenburg position.  The right neck area was prepped using chlorhexidine

scrub and draped in sterile fashion using a full drape and sterile probe

cover-employed.  The medial and lateral heads of the sternocleidomastoid muscle

were identified, as was the carotid pulse.  The internal jugular vein was

identified using ultrasound.  Anesthesia was achieved over the vein using 1%

lidocaine.  Using ultrasound guidance, the introducer needle was inserted into

the internal jugular vein under direct ultrasound visualization.  Venous blood

was withdrawn.  The syringe was removed, and a guidewire was advanced into the

introducer needle.  A small incision was made at the skin surface with a scalpel,

and the introducer needle was exchanged for a dilator over the guidewire.  After

appropriate dilation was obtained, the dilator was exchanged over the wire for a

central venous catheter.  The wire was removed, and the catheter was sutured in

place.  A sterile SorbaView shield was placed over the catheter at the insertion

site.  The patient tolerated the procedure without any hemodynamic compromise.

At the time of procedure completion, all ports aspirated and flushed properly.

 

A postprocedure chest x-ray was performed showing the tip of the central line

visualized distal to the superior vena cava.  No pneumothorax.

 

ESTIMATED BLOOD LOSS:  15 mL. December 4, 2023    Called Virginia Left message and contact info to return call regarding: sooner appt

## 2023-12-04 NOTE — PROGRESS NOTES
"Stool Kit was sucessfully delivered via [employer] on 12/2. Package was left at the front door step of the following delivery address.      Abiola Matute \"[place]\"  [address] [place] [phone]    [employer]ancy [employer]    "

## 2023-12-04 NOTE — TELEPHONE ENCOUNTER
Jovita Fuller MD  You5 days ago     ANU Pondlisandro,    Her liver transplant is active and she is listed. I just spoke with Dr. Cervantes, her hepatologist, yesterday on the liver transplant conference and they want her to be seen at IBD clinic as soon as possible.    If possible, we need her to be seen in earlier than Jan 30th. Sorry for this back and forth. This is important for her liver transplant.    Thank you!

## 2023-12-04 NOTE — PROGRESS NOTES
I called [place] regarding her GI symptoms in the setting of CD with perianal disease. She had a flare with increased Bms in October which has since resolved. However she continues to have 4-7 Bms daily which per her is her baseline. 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 She is holding off fecal calprotectin and clinic visits due to insurance issues which per she may resolve after the new year.     I recommended a colonoscopy which was the plan per her last clinic visit given more frequent stools than expected. It will also give us a definitive answer on whether her CD is active or not. She will schedule it in late Jan and early Feb.

## 2023-12-29 ENCOUNTER — TELEPHONE (OUTPATIENT)
Dept: GASTROENTEROLOGY | Facility: CLINIC | Age: 58
End: 2023-12-29
Payer: MEDICARE

## 2023-12-29 NOTE — TELEPHONE ENCOUNTER
"Endoscopy Scheduling Screen    Have you had a positive Covid test in the last 14 days?  No    Are you active on MyChart?   Yes    What insurance is in the chart?  Other:  Medicare [facility]    Ordering/Referring Provider: Alina   (If ordering provider performs procedure, schedule with ordering provider unless otherwise instructed. )    BMI: Estimated body mass index is 28.25 kg/m  as calculated from the following:    Height as of 9/15/23: 1.676 m (5' 6\").    Weight as of 9/20/23: 79.4 kg (175 lb).     Sedation Ordered  MAC/deep sedation.   BMI<= 45 45 < BMI <= 48 48 < BMI < = 50  BMI > 50   No Restrictions No [facility]  No [facility]  [facility] with exceptions Hospital Only OR Only       Are you taking any prescription medications for pain 3 or more times per week?   NO - No RN review required.    Do you have a history of malignant hyperthermia or adverse reaction to anesthesia?  No    (Females) Are you currently pregnant?   No     Have you been diagnosed or told you have pulmonary hypertension?   No    Do you have an LVAD?  No    Have you been told you have moderate to severe sleep apnea?  No    Have you been told you have COPD, asthma, or any other lung disease?  No    Do you have any heart conditions?  No     Have you ever had an organ transplant?   No    Have you ever had or are you awaiting a heart or lung transplant?   No    Have you had a stroke or transient ischemic attack (TIA aka \"mini stroke\" in the last 6 months?   No    Have you been diagnosed with or been told you have cirrhosis of the liver?   Yes (RN Review required for scheduling unless scheduling in Hospital.)    Are you currently on dialysis?   No    Do you need assistance transferring?   No    BMI: Estimated body mass index is 28.25 kg/m  as calculated from the following:    Height as of 9/15/23: 1.676 m (5' 6\").    Weight as of 9/20/23: 79.4 kg (175 lb).     Is patients BMI > 40 and scheduling location [facility]?  No    Do you take an injectable medication for " weight loss or diabetes (excluding insulin)?  No    Do you take the medication Naltrexone?  No    Do you take blood thinners?  No       Prep   Are you currently on dialysis or do you have chronic kidney disease?  No    Do you have a diagnosis of diabetes?  No    Do you have a diagnosis of cystic fibrosis (CF)?  No    On a regular basis do you go 3 -5 days between bowel movements?  No    BMI > 40?  No    Preferred Pharmacy:    [facility] [address]  [address] [address]  Phone: [phone] Fax: [phone] Alternate Fax: [phone], [phone]      Final Scheduling Details   Colonoscopy prep sent?  Standard MiraLAX    Procedure scheduled  Colonoscopy    Surgeon:  Joe     Date of procedure:  4/15/24     Pre-OP / PAC:   No - Not required for this site.    Location  [facility] - Per order.    Sedation   MAC/Deep Sedation - Per order.      Patient Reminders:   You will receive a call from a Nurse to review instructions and health history.  This assessment must be completed prior to your procedure.  Failure to complete the Nurse assessment may result in the procedure being cancelled.      On the day of your procedure, please designate an adult(s) who can drive you home stay with you for the next 24 hours. The medicines used in the exam will make you sleepy. You will not be able to drive.      You cannot take public transportation, ride share services, or non-medical taxi service without a responsible caregiver.  Medical transport services are allowed with the requirement that a responsible caregiver will receive you at your destination.  We require that drivers and caregivers are confirmed prior to your procedure.

## 2023-12-29 NOTE — TELEPHONE ENCOUNTER
Health Call Center    Phone Message    May a detailed message be left on voicemail: yes     Reason for Call: Other: [place] is calling in asking for a call back from [place]. She states that she has some confusion over her appts, as her appt with Dr. Fuller was pushed up from April to February, but Pt was still unable to schedule her colonoscopy any sooner than April and wanted to discuss whether there was any way to expedite this as well. Please call back as soon as possible to discuss.     Action Taken: Message routed to:  [facility] GI    Travel Screening: Not Applicable

## 2023-12-29 NOTE — TELEPHONE ENCOUNTER
Pre Assessment RN Review    Focused Assessments      Cirrhosis Assessment    No results found for requested labs within last 90 days.       MELD 3.0: 16 at 9/19/2023  9:19 AM  MELD-Na: 11 at 9/19/2023  9:19 AM  Calculated from:  Serum Creatinine: 0.74 mg/dL (Using min of 1 mg/dL) at 9/19/2023  9:19 AM  Serum Sodium: 133 mmol/L at 9/19/2023  9:19 AM  Total Bilirubin: 2.2 mg/dL at 9/19/2023  9:19 AM  Serum Albumin: 3.2 g/dL at 9/19/2023  9:19 AM  INR(ratio): 1.21 at 9/19/2023  9:19 AM  Age at listing (hypothetical): 58 years  Sex: Female at 9/19/2023  9:19 AM      INR <= 2.0*  and  Hgb >= 9 g/dL  and  Plt >= 50 10^9/L INR > 2.0   OR  Hgb < 9 g/dL   OR  Plt < 50 10^9/L   No Scheduling Restrictions Hospital Only   *Exception for patients on anticoagulation therapy.    Hx of variceal bleeding? No. (no scheduling restrictions)    Paracentesis in the last month? No      Scheduling Status & Recommendations    Sedation: MAC/Deep Sedation - Per order.  Location Type: No Scheduling Restrictions - Per RN assessment.

## 2024-01-03 ENCOUNTER — DOCUMENTATION ONLY (OUTPATIENT)
Dept: TRANSPLANT | Facility: CLINIC | Age: 59
End: 2024-01-03
Payer: MEDICARE

## 2024-01-03 NOTE — PROGRESS NOTES
January 3, 2024 9:13 AM - Fabio Antunez RN:     Message sent to Endoscopy scheduling and Dr. Fuller requesting sooner colonoscopy date. Patient currently scheduled for return visit with Dr. Fuller 2/13, colonoscopy for 4/15.    [employer] message sent to patient with update. Provided transplant financial  office number to discuss any changes/concerns with insurance.   1/30/17

## 2024-01-04 NOTE — TELEPHONE ENCOUNTER
Discussed with endoscopy scheduling -- patient will be contacted and offered earlier colonoscopy date of 2/27/24.

## 2024-01-05 NOTE — TELEPHONE ENCOUNTER
Called patient to discuss scheduling options. Endoscopy has a slot on hold for 2/27/24 for this patent, but they have been unable to connect with the patient so far. No answer; left voicemail with this information and request for patient to return endoscopy's call.

## 2024-01-08 ENCOUNTER — TELEPHONE (OUTPATIENT)
Dept: GASTROENTEROLOGY | Facility: CLINIC | Age: 59
End: 2024-01-08
Payer: MEDICARE

## 2024-01-08 NOTE — TELEPHONE ENCOUNTER
Caller:   Reason for Reschedule/Cancellation (please be detailed, any staff messages or encounters to note?): TATE HELD SPOT      Prior to reschedule please review:  Ordering Provider:     CARMENCITA BERRY     Sedation per order: MAC  Does patient have any ASC Exclusions, please identify?:       Notes on Cancelled Procedure:  Procedure: Lower Endoscopy [Colonoscopy]   Date: 4/15  Location: [facility]; [address] [place] [address], [place] [address]  Surgeon: YANG      Rescheduled: Yes  Procedure: Lower Endoscopy [Colonoscopy]   Date: 2/27  Location: [facility]; [address] [place] [address], [place] [address]  Surgeon: YANG  Sedation Level Scheduled  MAC,  Reason for Sedation Level ORDER  Prep/Instructions updated and sent: Y       Send In - basket message to Panc - Giacomo Pool if E[facility]  procedure is canceled or rescheduled: [ N/A, YES or NO]

## 2024-01-10 ENCOUNTER — PATIENT OUTREACH (OUTPATIENT)
Dept: GASTROENTEROLOGY | Facility: CLINIC | Age: 59
End: 2024-01-10
Payer: MEDICARE

## 2024-01-10 NOTE — PROGRESS NOTES
Sent clinic note per request.    Faxed/E-mailed:  -- Virtual Visit Clinic Note (Dr. Jovita Fuller, 6-13-23)    Facility Information:  Yolande at [facility]  Phone #: [phone]  Fax #: [phone]  E-mail: [email or web address]      SK

## 2024-01-10 NOTE — PROGRESS NOTES
Received vm from Yolande at [facility]. With pt's new insurance. [facility] need most recent clinic notes to request urgent PA.    Hello Team,  Please fax and email clinic notes from 6/13/23 to Yolande at [facility].    (o) [phone] (F) [phone]    Please also email.    Thank you.  Remi

## 2024-01-11 ENCOUNTER — PATIENT OUTREACH (OUTPATIENT)
Dept: GASTROENTEROLOGY | Facility: CLINIC | Age: 59
End: 2024-01-11
Payer: MEDICARE

## 2024-01-11 NOTE — PROGRESS NOTES
Spoke with Yolande GOODIRCH from \[facility]\"", Yolande states they were in the process of applying for pt's urgent Infusion PA. The PA came back as needing it to be process by the ordering clinic. Everything has been enter it just needs to be sent to Cover My Meds from the clinic.    Key code: [id number]

## 2024-01-12 NOTE — PROGRESS NOTES
January 12, 2024    Called [place] Left message and contact info to return call regarding: Inflectra denial

## 2024-01-12 NOTE — PROGRESS NOTES
Dr. Fuller  PA for Inflectra 5mg/kg every 4 week has been denied. See below denial letter.    Pt has new insurance with Medicare. Pt missed her infusion in December due to insurance issues.     Medicare is very hard to get approval for dose frequency outside of FDA approval. We can definitely appeal.    Please advice if ok to appeal or transition to different therapy.    Thank you.  Remi

## 2024-01-13 ENCOUNTER — HEALTH MAINTENANCE LETTER (OUTPATIENT)
Age: 59
End: 2024-01-13

## 2024-01-15 NOTE — PROGRESS NOTES
Spoke with [place] discuss Medicare denial of PA for infusion. At this time Dr. Fuller has be notified and waiting for next step.

## 2024-01-15 NOTE — PROGRESS NOTES
[place] called and gave permission to speak with Albino on speaker phone. Discuss with Albino and [place] that at this point, pt is overdue. Will attempt to ask PA from second insurance if that is denied then we can go back to q8 weeks depending on provider recommendation. Will update pt after a plan has been formulated with both provider and [facility] and pt.

## 2024-01-16 ENCOUNTER — MEDICAL CORRESPONDENCE (OUTPATIENT)
Dept: HEALTH INFORMATION MANAGEMENT | Facility: CLINIC | Age: 59
End: 2024-01-16
Payer: MEDICARE

## 2024-01-16 NOTE — PROGRESS NOTES
Spoke with Yolande GOODRICH at [facility], Yolande will check but she thinks that once a PA has been requested with Primary, we can not submit to secondary. She will confirm and let us know.

## 2024-01-16 NOTE — PROGRESS NOTES
Dr. Fuller,  After discussion with [place] and Yolande at [facility], pt is overdue with her Remicade. The Appeals process through Medicare is long and not always fruitful.    Please advice if you are ok with Therapy Plan for Remicade [id number] since this is easier to obtain a PA so that we can get pt infuse and then decide on next steps.    Thank you.  Remi

## 2024-01-16 NOTE — PROGRESS NOTES
Dr. Fuller,  Looking at the denial letter more closely. Medicare denied due to formulary. So it does not matter dose frequency. We need LMN to show that pt has failed Humira or Skyrizi and if not then why Remicade is needed.    Did not see that she has tried Humira or Skyrizi. Pt stated she will be on the liver transplant list next month is concern about if Humira or Syrizi will have any effect on that as well.    Please provide LMN.    Thank you.  Yovani

## 2024-01-16 NOTE — PROGRESS NOTES
Spoke with [place], updated [place] with the new information that the denial is a formulary and not a dose frequency. We will need to appeal. Also update pt that PA team confirm, PA is request with Primary insurance and not secondary as an alternative, the secondary will only cover what has been approved by primary insurance part not cover. [place] verbalize agreement and appreciative of the information.

## 2024-01-17 ENCOUNTER — DOCUMENTATION ONLY (OUTPATIENT)
Dept: TRANSPLANT | Facility: CLINIC | Age: 59
End: 2024-01-17
Payer: MEDICARE

## 2024-01-17 DIAGNOSIS — R94.31 ABNORMAL ELECTROCARDIOGRAM (ECG) (EKG): ICD-10-CM

## 2024-01-17 DIAGNOSIS — C22.0 HCC (HEPATOCELLULAR CARCINOMA) (H): Primary | ICD-10-CM

## 2024-01-17 NOTE — PROGRESS NOTES
Received LMN to appeal PA Denial of Remicade.    Spoke with Yolande Qureshi RN at [facility], discuss email the LMN to her. Yolande will check with her team about the appeals process for formulary issues.

## 2024-01-17 NOTE — PROGRESS NOTES
Pre-Liver Transplant Evaluation/Teaching    TEACHING TOPICS: Evaluation Process, Evaluation Items, Diagnostic Studies, Consultation, Chemical Dependency Policy, CD Eval, Donor Source, Liver Allocation, MELD System, UNOS, Waiting List, Follow up while on transplant list, Follow up after transplantation, Infection and Rejection, Immunosuppression , Medication Teaching, Lab Recording after transplant, Laboratory Frequency after transplant , Consent for evaluation and One year survival rates    INSTRUCTIONAL MATERIAL USED/GIVEN:   Liver Transplant Handbook, MELD Booklet, Donor Booklet, Web Sites Options, Verbal instructions, Multiple Listing Brochure , Consent for evaluation signed, One year survival rates and SRTR (Scientific Registry) Data    Person(s) involved in teaching: patient,  Albino  Asks Questions: YES  Eager to Learn: YES  Cooperative: YES  Receptive (willing/able to accept information): YES  Reason for the appointment, diagnosis and treatment plan: YES  Knowledge of proper use of medications and conditions for which they are ordered (with special attention to potential side effects or drug interactions): YES  Which situations necessitate calling provider and whom to contact: YES    Teaching Concerns Addressed   Comments: attentive, asking pertinent questions  Nutritional needs and diet plan: YES  How and/when to access community resources: YES  Patient is aware of and agrees to required commitment to post-op care and long term follow-up: YES    Patient open to all Extended Criteria organ offers (underutilized donors): Yes or no: Yes  Details: Open to All Extended Criteria. Eligible for Living Donor.    Patient has name and phone number of transplant coordinator.   Time Spent over-the-phone teachin minutes.

## 2024-01-18 NOTE — PROGRESS NOTES
Spoke with Justen at [facility], Yoalnde ovalles her team tried to appeal but [employer] only except the appeal from the provider's office.    They spoke with Ksenia HANKINS at [facility] #[phone].

## 2024-01-18 NOTE — PROGRESS NOTES
Hello Team,  Please fax LMN and emailed appeal form to [facility] per form request.    Thank you.  Remi

## 2024-01-18 NOTE — PROGRESS NOTES
Spoke with Verónica-Pharmacy service at [facility] [phone]. Ask to have pt's denial of inflectra be expedited appeal. Verónica will fax over the appeal form.    Ref #[id number].

## 2024-01-18 NOTE — PROGRESS NOTES
NICOLA letter and form faxed to [facility] at [phone]. Copy scanned into patient chart.    Zayra Johnson LPN

## 2024-01-29 ENCOUNTER — MYC MEDICAL ADVICE (OUTPATIENT)
Dept: GASTROENTEROLOGY | Facility: CLINIC | Age: 59
End: 2024-01-29
Payer: MEDICARE

## 2024-01-29 DIAGNOSIS — K50.113 CROHN'S DISEASE OF LARGE INTESTINE WITH FISTULA (H): Primary | ICD-10-CM

## 2024-01-29 NOTE — PROGRESS NOTES
Spoke with Nick GOODRICH at [facility], she states her team was able to get PA [employer]pro[employer] and [employer] will be sending approval letter today.    They will do a financial benefits review and call pt to go over pt portion. Her team will also call to schedule soonest infusion appt.    Spoke with [place] regarding above information. [place] appreciate the call back and will await the financial call as well as the schedule call.

## 2024-01-29 NOTE — PROGRESS NOTES
Spoke with Katalina regarding inflectra Infusion PAChing Morgan states she got a letter from [facility] that states she has approval but has a pt portion of about $2000+.    Spoke with Yolande Qureshi RN at [facility], Yolande states she did not get a letter. Will have her team do a financial check and get back to me.

## 2024-01-30 NOTE — PROGRESS NOTES
Spoke with Yolande GOODRICH at [facility] infusion, Yolande state her team was able to get the financial breakdown. Pt will owe $2300.97. she is not sure if this will change once pt meets her deductible or not. Yolande states that she have seen the amount decrease if infuse in a hospital base facility.

## 2024-01-31 NOTE — PROGRESS NOTES
Spoke with [place], discuss the information given by [facility] regarding pt portion responsibility. Advice pt to call [facility] to speak with Shyla [phone] ext [phone] to ask for clarification.    Also advice pt of the option to have infusion done at a hospital base facility. Discuss with pt that since she is delayed already she can move forward with current PA at [facility] and get infusion or a new PA can be started at our hospital base infusion center which will cause more delays..     Pt states she is appreciative of the information. She would like to move forward with the current PA at [facility]. She knows her body can not wait any longer for the infusion. She will let us know after this infusion if she wants to transition to a hospital base infusion facility. Right now she just want to confirm her pt portion and get her infusion.

## 2024-02-05 ENCOUNTER — OFFICE VISIT (OUTPATIENT)
Dept: TRANSPLANT | Facility: CLINIC | Age: 59
End: 2024-02-05
Attending: INTERNAL MEDICINE
Payer: MEDICARE

## 2024-02-05 ENCOUNTER — HOSPITAL ENCOUNTER (OUTPATIENT)
Dept: MRI IMAGING | Facility: CLINIC | Age: 59
Discharge: HOME | End: 2024-02-05
Attending: INTERNAL MEDICINE
Payer: MEDICARE

## 2024-02-05 ENCOUNTER — ANCILLARY PROCEDURE (OUTPATIENT)
Dept: CARDIOLOGY | Facility: CLINIC | Age: 59
End: 2024-02-05
Attending: INTERNAL MEDICINE
Payer: MEDICARE

## 2024-02-05 VITALS
BODY MASS INDEX: 30.99 KG/M2 | SYSTOLIC BLOOD PRESSURE: 116 MMHG | HEIGHT: 65 IN | DIASTOLIC BLOOD PRESSURE: 79 MMHG | OXYGEN SATURATION: 99 % | TEMPERATURE: 98.6 F | WEIGHT: 186 LBS | HEART RATE: 98 BPM

## 2024-02-05 DIAGNOSIS — C22.0 HCC (HEPATOCELLULAR CARCINOMA) (H): ICD-10-CM

## 2024-02-05 DIAGNOSIS — R94.31 ABNORMAL ELECTROCARDIOGRAM (ECG) (EKG): ICD-10-CM

## 2024-02-05 LAB — LVEF ECHO: NORMAL

## 2024-02-05 PROCEDURE — G0463 HOSPITAL OUTPT CLINIC VISIT: HCPCS | Performed by: TRANSPLANT SURGERY

## 2024-02-05 PROCEDURE — 74183 MRI ABD W/O CNTR FLWD CNTR: CPT | Mod: 26 | Performed by: RADIOLOGY

## 2024-02-05 PROCEDURE — A9585 GADOBUTROL INJECTION: HCPCS | Performed by: INTERNAL MEDICINE

## 2024-02-05 PROCEDURE — 74183 MRI ABD W/O CNTR FLWD CNTR: CPT | Mod: MG

## 2024-02-05 PROCEDURE — 255N000002 HC RX 255 OP 636: Performed by: INTERNAL MEDICINE

## 2024-02-05 PROCEDURE — G1010 CDSM STANSON: HCPCS | Performed by: RADIOLOGY

## 2024-02-05 PROCEDURE — 93306 TTE W/DOPPLER COMPLETE: CPT | Performed by: STUDENT IN AN ORGANIZED HEALTH CARE EDUCATION/TRAINING PROGRAM

## 2024-02-05 PROCEDURE — 99214 OFFICE O/P EST MOD 30 MIN: CPT | Performed by: TRANSPLANT SURGERY

## 2024-02-05 RX ORDER — GADOBUTROL 604.72 MG/ML
10 INJECTION INTRAVENOUS ONCE
Status: COMPLETED | OUTPATIENT
Start: 2024-02-05 | End: 2024-02-05

## 2024-02-05 RX ADMIN — GADOBUTROL 8 ML: 604.72 INJECTION INTRAVENOUS at 07:29

## 2024-02-05 NOTE — LETTER
"    2/5/2024         RE: Abiola Matute  [address] [address]        Dear Colleague,    Thank you for referring your patient, Abiola Matute, to the [facility]. Please see a copy of my visit note below.    Patient is here to understand the risk benefits and alternatives of liver transplantation.  Please refer to my previous notes.  I have seen the patient previously.    Interval history:  Patient developed a new hepatocellular carcinoma in segment 7 which has been ablated.  She is having a repeat MRI today to assess the response.  She is also needs to have a colonoscopy.  She otherwise is doing well.  No chest pains breathing difficulty or any other complaints.  She is planning on going on a vacation.    Present with her worse her  who is very supportive.    /79   Pulse 98   Temp 98.6  F (37  C) (Oral)   Ht 1.651 m (5' 5\")   Wt 84.4 kg (186 lb)   LMP 05/31/2006   SpO2 99%   BMI 30.95 kg/m      Examination of the abdomen: The abdomen is soft no tenderness.    Clinical impression:    Laënnec cirrhosis with hepatocellular carcinoma.    Recommendations:    List without further delay.    I had a long discussion with the patient regarding liver transplantation which included but was not limited to  the following points:    Liver transplant selection committee process.  The federal rules for cadaveric waiting list, the size and blood type matching of the organ. The availability of living-related donor transplantation.  The types of donors: brain death donors, non-heart beating donors, partial liver grafts: splits and living donor grafts  Extended criteria  Donors (older age, steasosis) and the increased  risk of primary non-function using the extended criteria donors  The CDC high risk donors,  Risk of donor transmitted infections and donor transmitted malignancy  The liver transplant operation and the associated risks and technical complications " which can include intraoperative death, post operative death,  Primary non-function, bleeding requiring re-operations, arterial and biliary complications, bowel perforations, and intra abdominal abscess. Some of these complicaitons may require a second operation.  The postoperative course, the ICU stay and risk of postoperative complications which can include sepsis, MI, stroke, brain injury, pneumonia, pleural effusions, and renal dysfunction.  The current 1 year and 5 year graft and patient survivals.  The need for life long immunosuppressive therapy and the side effects of these medications, including the possibility of toxicity, opportunistic infections, risk of cancer including lymphoma, and the possibility of rejection even if the patient is taking the medication exactly as prescribed.  The need for compliance with medications and follow-up visits in the clinic and thereafter.  The patient and family understand these risks and wish to proceed to transplantation      Again, thank you for allowing me to participate in the care of your patient.        Sincerely,        Pravin Ball MD

## 2024-02-05 NOTE — PROGRESS NOTES
"Patient is here to understand the risk benefits and alternatives of liver transplantation.  Please refer to my previous notes.  I have seen the patient previously.    Interval history:  Patient developed a new hepatocellular carcinoma in segment 7 which has been ablated.  She is having a repeat MRI today to assess the response.  She is also needs to have a colonoscopy.  She otherwise is doing well.  No chest pains breathing difficulty or any other complaints.  She is planning on going on a vacation.    Present with her worse her  who is very supportive.    /79   Pulse 98   Temp 98.6  F (37  C) (Oral)   Ht 1.651 m (5' 5\")   Wt 84.4 kg (186 lb)   LMP 05/31/2006   SpO2 99%   BMI 30.95 kg/m      Examination of the abdomen: The abdomen is soft no tenderness.    Clinical impression:    Laënnec cirrhosis with hepatocellular carcinoma.    Recommendations:    List without further delay.    I had a long discussion with the patient regarding liver transplantation which included but was not limited to  the following points:    Liver transplant selection committee process.  The federal rules for cadaveric waiting list, the size and blood type matching of the organ. The availability of living-related donor transplantation.  The types of donors: brain death donors, non-heart beating donors, partial liver grafts: splits and living donor grafts  Extended criteria  Donors (older age, steasosis) and the increased  risk of primary non-function using the extended criteria donors  The CDC high risk donors,  Risk of donor transmitted infections and donor transmitted malignancy  The liver transplant operation and the associated risks and technical complications which can include intraoperative death, post operative death,  Primary non-function, bleeding requiring re-operations, arterial and biliary complications, bowel perforations, and intra abdominal abscess. Some of these complicaitons may require a second " operation.  The postoperative course, the ICU stay and risk of postoperative complications which can include sepsis, MI, stroke, brain injury, pneumonia, pleural effusions, and renal dysfunction.  The current 1 year and 5 year graft and patient survivals.  The need for life long immunosuppressive therapy and the side effects of these medications, including the possibility of toxicity, opportunistic infections, risk of cancer including lymphoma, and the possibility of rejection even if the patient is taking the medication exactly as prescribed.  The need for compliance with medications and follow-up visits in the clinic and thereafter.  The patient and family understand these risks and wish to proceed to transplantation

## 2024-02-05 NOTE — NURSING NOTE
"Chief Complaint   Patient presents with    Transplant Evaluation     Liver transplant evaluation     Vital signs:  Temp: 98.6  F (37  C) Temp src: Oral BP: 116/79 Pulse: 98     SpO2: 99 %     Height: 165.1 cm (5' 5\") Weight: 84.4 kg (186 lb)  Estimated body mass index is 30.95 kg/m  as calculated from the following:    Height as of this encounter: 1.651 m (5' 5\").    Weight as of this encounter: 84.4 kg (186 lb).      Dennys Boothe RN on 2/5/2024 at 9:24 AM    "

## 2024-02-06 NOTE — TELEPHONE ENCOUNTER
[place] WATSON [facility] (supporting You)Yesterday (9:58 AM)     [place], here is the info on inflectra. Please be sure [facility]. Let me know how things are going as far as the (PA).     THX!  https://www.cms.gov/medicare-coverage-database/view/article.aspx?cqnwzcaLj=53349&MgmOrameIayc=  --------------------------------------  Spoke with Yolande GOODRICH at [facility], Yolande states there PA Team discuss with pt that for pt to go through Medicare Part B it needs to be infusion via Hospital base facility. Since [facility] is a stand alone site/ Home Infusion the [place] needs [place] go through [place].    February 6, 2024    Called Daniel Coleman message and contact info to return call regarding: future infusion site

## 2024-02-07 ENCOUNTER — OFFICE VISIT (OUTPATIENT)
Dept: FAMILY MEDICINE | Facility: CLINIC | Age: 59
End: 2024-02-07
Payer: MEDICARE

## 2024-02-07 ENCOUNTER — PATIENT OUTREACH (OUTPATIENT)
Dept: GASTROENTEROLOGY | Facility: CLINIC | Age: 59
End: 2024-02-07

## 2024-02-07 ENCOUNTER — VIRTUAL VISIT (OUTPATIENT)
Dept: TRANSPLANT | Facility: CLINIC | Age: 59
End: 2024-02-07
Attending: INTERNAL MEDICINE
Payer: MEDICARE

## 2024-02-07 VITALS
RESPIRATION RATE: 18 BRPM | BODY MASS INDEX: 30.82 KG/M2 | OXYGEN SATURATION: 99 % | SYSTOLIC BLOOD PRESSURE: 106 MMHG | HEART RATE: 91 BPM | HEIGHT: 65 IN | TEMPERATURE: 97.3 F | DIASTOLIC BLOOD PRESSURE: 72 MMHG | WEIGHT: 185 LBS

## 2024-02-07 DIAGNOSIS — K50.113 CROHN'S DISEASE OF LARGE INTESTINE WITH FISTULA (H): Primary | ICD-10-CM

## 2024-02-07 DIAGNOSIS — K50.113 CROHN'S DISEASE OF LARGE INTESTINE WITH FISTULA (H): ICD-10-CM

## 2024-02-07 DIAGNOSIS — Z23 NEED FOR PROPHYLACTIC VACCINATION AND INOCULATION AGAINST INFLUENZA: ICD-10-CM

## 2024-02-07 DIAGNOSIS — I10 ESSENTIAL HYPERTENSION WITH GOAL BLOOD PRESSURE LESS THAN 140/90: ICD-10-CM

## 2024-02-07 DIAGNOSIS — C22.0 HCC (HEPATOCELLULAR CARCINOMA) (H): ICD-10-CM

## 2024-02-07 DIAGNOSIS — E66.811 CLASS 1 OBESITY WITH SERIOUS COMORBIDITY AND BODY MASS INDEX (BMI) OF 30.0 TO 30.9 IN ADULT, UNSPECIFIED OBESITY TYPE: Primary | ICD-10-CM

## 2024-02-07 LAB
AFP SERPL-MCNC: 8.7 NG/ML
ALBUMIN SERPL BCG-MCNC: 3.7 G/DL (ref 3.5–5.2)
ALBUMIN SERPL BCG-MCNC: 3.7 G/DL (ref 3.5–5.2)
ALP SERPL-CCNC: 152 U/L (ref 40–150)
ALP SERPL-CCNC: 153 U/L (ref 40–150)
ALT SERPL W P-5'-P-CCNC: 43 U/L (ref 0–50)
ALT SERPL W P-5'-P-CCNC: 44 U/L (ref 0–50)
ANION GAP SERPL CALCULATED.3IONS-SCNC: 11 MMOL/L (ref 7–15)
AST SERPL W P-5'-P-CCNC: 86 U/L (ref 0–45)
AST SERPL W P-5'-P-CCNC: 86 U/L (ref 0–45)
BASOPHILS # BLD AUTO: 0 10E3/UL (ref 0–0.2)
BASOPHILS NFR BLD AUTO: 1 %
BILIRUB DIRECT SERPL-MCNC: 0.56 MG/DL (ref 0–0.3)
BILIRUB DIRECT SERPL-MCNC: 0.57 MG/DL (ref 0–0.3)
BILIRUB SERPL-MCNC: 1.7 MG/DL
BILIRUB SERPL-MCNC: 1.8 MG/DL
BUN SERPL-MCNC: 12.3 MG/DL (ref 8–23)
CALCIUM SERPL-MCNC: 9.4 MG/DL (ref 8.6–10)
CHLORIDE SERPL-SCNC: 103 MMOL/L (ref 98–107)
CREAT SERPL-MCNC: 0.69 MG/DL (ref 0.51–0.95)
CREAT UR-MCNC: 4.4 MG/DL
CRP SERPL-MCNC: 8.29 MG/L
CRP SERPL-MCNC: 8.45 MG/L
DEPRECATED HCO3 PLAS-SCNC: 24 MMOL/L (ref 22–29)
EGFRCR SERPLBLD CKD-EPI 2021: >90 ML/MIN/1.73M2
EOSINOPHIL # BLD AUTO: 0.3 10E3/UL (ref 0–0.7)
EOSINOPHIL NFR BLD AUTO: 8 %
ERYTHROCYTE [DISTWIDTH] IN BLOOD BY AUTOMATED COUNT: 14.4 % (ref 10–15)
GLUCOSE SERPL-MCNC: 96 MG/DL (ref 70–99)
HCT VFR BLD AUTO: 42.2 % (ref 35–47)
HGB BLD-MCNC: 14.5 G/DL (ref 11.7–15.7)
IMM GRANULOCYTES # BLD: 0 10E3/UL
IMM GRANULOCYTES NFR BLD: 0 %
INR PPP: 1.11 (ref 0.85–1.15)
LYMPHOCYTES # BLD AUTO: 0.7 10E3/UL (ref 0.8–5.3)
LYMPHOCYTES NFR BLD AUTO: 18 %
MCH RBC QN AUTO: 35.1 PG (ref 26.5–33)
MCHC RBC AUTO-ENTMCNC: 34.4 G/DL (ref 31.5–36.5)
MCV RBC AUTO: 102 FL (ref 78–100)
MICROALBUMIN UR-MCNC: <12 MG/L
MICROALBUMIN/CREAT UR: NORMAL MG/G{CREAT}
MONOCYTES # BLD AUTO: 0.4 10E3/UL (ref 0–1.3)
MONOCYTES NFR BLD AUTO: 10 %
NEUTROPHILS # BLD AUTO: 2.5 10E3/UL (ref 1.6–8.3)
NEUTROPHILS NFR BLD AUTO: 64 %
PLATELET # BLD AUTO: 114 10E3/UL (ref 150–450)
POTASSIUM SERPL-SCNC: 4.6 MMOL/L (ref 3.4–5.3)
PROT SERPL-MCNC: 8 G/DL (ref 6.4–8.3)
PROT SERPL-MCNC: 8.2 G/DL (ref 6.4–8.3)
RBC # BLD AUTO: 4.13 10E6/UL (ref 3.8–5.2)
SODIUM SERPL-SCNC: 138 MMOL/L (ref 135–145)
WBC # BLD AUTO: 3.9 10E3/UL (ref 4–11)

## 2024-02-07 PROCEDURE — 82248 BILIRUBIN DIRECT: CPT | Mod: 59 | Performed by: NURSE PRACTITIONER

## 2024-02-07 PROCEDURE — 84460 ALANINE AMINO (ALT) (SGPT): CPT | Mod: 59 | Performed by: NURSE PRACTITIONER

## 2024-02-07 PROCEDURE — 82248 BILIRUBIN DIRECT: CPT | Performed by: NURSE PRACTITIONER

## 2024-02-07 PROCEDURE — 82105 ALPHA-FETOPROTEIN SERUM: CPT | Performed by: NURSE PRACTITIONER

## 2024-02-07 PROCEDURE — 84155 ASSAY OF PROTEIN SERUM: CPT | Mod: 59 | Performed by: NURSE PRACTITIONER

## 2024-02-07 PROCEDURE — 90686 IIV4 VACC NO PRSV 0.5 ML IM: CPT | Performed by: NURSE PRACTITIONER

## 2024-02-07 PROCEDURE — 85610 PROTHROMBIN TIME: CPT | Performed by: NURSE PRACTITIONER

## 2024-02-07 PROCEDURE — 36415 COLL VENOUS BLD VENIPUNCTURE: CPT | Performed by: NURSE PRACTITIONER

## 2024-02-07 PROCEDURE — 99214 OFFICE O/P EST MOD 30 MIN: CPT | Mod: 25 | Performed by: NURSE PRACTITIONER

## 2024-02-07 PROCEDURE — 84450 TRANSFERASE (AST) (SGOT): CPT | Mod: 59 | Performed by: NURSE PRACTITIONER

## 2024-02-07 PROCEDURE — 82247 BILIRUBIN TOTAL: CPT | Mod: 59 | Performed by: NURSE PRACTITIONER

## 2024-02-07 PROCEDURE — 80053 COMPREHEN METABOLIC PANEL: CPT | Performed by: NURSE PRACTITIONER

## 2024-02-07 PROCEDURE — 82043 UR ALBUMIN QUANTITATIVE: CPT | Performed by: NURSE PRACTITIONER

## 2024-02-07 PROCEDURE — 86140 C-REACTIVE PROTEIN: CPT | Performed by: NURSE PRACTITIONER

## 2024-02-07 PROCEDURE — 84075 ASSAY ALKALINE PHOSPHATASE: CPT | Mod: 59 | Performed by: NURSE PRACTITIONER

## 2024-02-07 PROCEDURE — 85025 COMPLETE CBC W/AUTO DIFF WBC: CPT | Performed by: NURSE PRACTITIONER

## 2024-02-07 PROCEDURE — 82570 ASSAY OF URINE CREATININE: CPT | Performed by: NURSE PRACTITIONER

## 2024-02-07 PROCEDURE — G0008 ADMIN INFLUENZA VIRUS VAC: HCPCS | Performed by: NURSE PRACTITIONER

## 2024-02-07 RX ORDER — TIRZEPATIDE 5 MG/.5ML
5 INJECTION, SOLUTION SUBCUTANEOUS
Qty: 2 ML | Refills: 1 | Status: SHIPPED | OUTPATIENT
Start: 2024-02-07 | End: 2024-04-24

## 2024-02-07 RX ORDER — TIRZEPATIDE 2.5 MG/.5ML
2.5 INJECTION, SOLUTION SUBCUTANEOUS
Qty: 2 ML | Refills: 0 | Status: SHIPPED | OUTPATIENT
Start: 2024-02-07 | End: 2024-03-06

## 2024-02-07 NOTE — LETTER
"    2024         RE: Abiola Matute  [address] [address]        Dear Colleague,    Thank you for referring your patient, Abiola Matute, to the [facility]. Please see a copy of my visit note below.    Patient Name: Abiola Matute  : 1965  Age: 58 year old  MRN: [id number]  Date of Initial Social Work Evaluation: 2023    Patient is almost done with her transplant evaluation. I spoke with [place] via phone to update her psychosocial assessment.     Presenting Information   Living Situation: Virginia lives in a single family home with her spouse, Albino. One of their adult son's Mario lives in the home.   If not local, plans for short term stay:  They live local   Functional Status: Today, [place] reports that since she has been unable to get her infusions (see billing/finances), her mobility has declined. She indicated that it does fluctuate in that some days it is hard to get off the couch, and other days she is slower with ADLs.   Cultural/Language/Spiritual Considerations: None identified.     Support System  Primary Support Person Albino  Other support:  She has three adult children who are able to assist  Plan for support in immediate post-transplant period: Albino    Health Care Directive  Decision Maker: Pt when able   Alternate Decision Maker: STEVEN - Albino   Health Care Directive: Did not discuss    Mental Health/Coping:   History of Mental Health: [place] reports that overall her mental health is stable. She reports some \"heavier\" days such as her father's birthday yesterday. [place] reports that she is coping well.   History of Chemical Health: [place] shared that she is over 400 days sober and is very proud of her sobriety. She shared what she learned in her substance use treatment program, and feels like there is a need for more 55+ groups. She asked me to share this information and the value with supervisors/counselor.   She let this writer know that her PETh " orders have  and is wondering if she should have this completed again.     Current status: Stable  Coping: Appropriate   Services Needed/Recommended: None identified at this time.    Financial   Income: Virginia receives social security disability. Her spouse, Albino lost his job last year and is receiving unemployment at this time.   Impact of transplant on income: No impact identified at this time.   Insurance and medication coverage: Medicare and BCBS supplement.    Financial concerns: Virginia reports bills from last year when they switched from [employer] to Medicare. I sent a message to our financial counselor re: this. She also has been unable to get her infusions as they were going to bill this under Part D instead of Part B.   Resources needed: None at this time. Message sent to financial counselor.     Education provided by : Virginia feels well informed about the transplant process, and believes that everything is on track.     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 She completed the recommended intensive outpatient program through [facility]. She spoke very highly of the program and presented with insight into her previous substance use and coping skills. She has had negative PETh's since the last one on 2023.     Virginia has adequate finances and health insurance for transplant, and an intact support system. [place] is an acceptable candidate from a psychosocial perspective.     This writer will remain available to assist patient throughout the evaluation process and will follow patient through transplant if she is listed.  It was a pleasure to evaluate this patient for liver transplant.     RODGER Newby, [facility]  [facility] Transplant   [facility]  Phone: [phone]       Again, thank you for allowing me to participate in the care of your patient.        Sincerely,        JOHN Campbell

## 2024-02-07 NOTE — PROGRESS NOTES
"  Assessment & Plan     Virginia was seen today for weight problem and imm/inj.    Diagnoses and all orders for this visit:    Class 1 obesity with serious comorbidity and body mass index (BMI) of 30.0 to 30.9 in adult, unspecified obesity type  Patient education completed regarding GLP-1 agonists, will plan initiation of Mounjaro 2.5 mg once weekly for 1 month and then transition to Mounjaro 5 mg,  Plan follow-up in 6-8 weeks, sooner as needed.    -     tirzepatide (MOUNJARO) 2.5 MG/0.5ML pen; Inject 2.5 mg Subcutaneous every 7 days for 28 days  -     tirzepatide (MOUNJARO) 5 MG/0.5ML pen; Inject 5 mg Subcutaneous every 7 days    Essential hypertension with goal blood pressure less than 140/90  Currently stable, annual urine microalbumin surveillance.    -     Albumin Random Urine Quantitative with Creat Ratio    Need for prophylactic vaccination and inoculation against influenza  -     INFLUENZA VACCINE IM > 6 MONTHS VALENT IIV4 (AFLURIA/FLUZONE)        BMI  Estimated body mass index is 30.79 kg/m  as calculated from the following:    Height as of this encounter: 1.651 m (5' 5\").    Weight as of this encounter: 83.9 kg (185 lb).     Return in about 8 weeks (around 4/3/2024) for In Clinic, Med check.        Subjective   Virginia is a 58 year old, presenting for the following health issues:  Weight Problem and Imm/Inj (Flu Shot)        2/7/2024     8:15 AM   Additional Questions   Roomed by Lacy DUFFY     Via the Health Maintenance questionnaire, the patient has reported the following services have been completed -Mammogram, this information has been sent to the abstraction team.  History of Present Illness       Reason for visit:  Flu and Covid vaccine/ assistant with weight loss    She eats 4 or more servings of fruits and vegetables daily.She consumes 0 sweetened beverage(s) daily.She exercises with enough effort to increase her heart rate 20 to 29 minutes per day.  She exercises with enough effort to increase her heart " "rate 3 or less days per week.   She is taking medications regularly.     Gaining weight.  Hasn't been able to exercise as much due to not being able to get Remicade.    Would like to consider medication to support weight loss.      Does well with fruits and vegetables.    Working with transplant clinic.    Last infusion of Remicade in November.        Review of Systems  Constitutional, HEENT, cardiovascular, pulmonary, gi and gu systems are negative, except as otherwise noted.      Objective    /72   Pulse 91   Temp 97.3  F (36.3  C)   Resp 18   Ht 1.651 m (5' 5\")   Wt 83.9 kg (185 lb)   LMP 05/31/2006   SpO2 99%   BMI 30.79 kg/m    Body mass index is 30.79 kg/m .    Physical Exam   GENERAL: alert and no distress  PSYCH: mentation appears normal, affect normal/bright        Signed Electronically by: Linda Macdonald, DARYN CNP    "

## 2024-02-07 NOTE — PROGRESS NOTES
"Patient Name: Abiola Matute  : 1965  Age: 58 year old  MRN: [id number]  Date of Initial Social Work Evaluation: 2023    Patient is almost done with her transplant evaluation. I spoke with [place] via phone to update her psychosocial assessment.     Presenting Information   Living Situation: Virginia lives in a single family home with her spouse, Albino. One of their adult son's Mario lives in the home.   If not local, plans for short term stay:  They live local   Functional Status: Today, [place] reports that since she has been unable to get her infusions (see billing/finances), her mobility has declined. She indicated that it does fluctuate in that some days it is hard to get off the couch, and other days she is slower with ADLs.   Cultural/Language/Spiritual Considerations: None identified.     Support System  Primary Support Person Albino  Other support:  She has three adult children who are able to assist  Plan for support in immediate post-transplant period: Albino    Health Care Directive  Decision Maker: Pt when able   Alternate Decision Maker: STEVEN - [facility]   Health Care Directive: Did not discuss    Mental Health/Coping:   History of Mental Health: Virginia reports that overall her mental health is stable. She reports some \"heavier\" days such as her father's birthday yesterday. [place] reports that she is coping well.   History of Chemical Health: [place] shared that she is over 400 days sober and is very proud of her sobriety. She shared what she learned in her substance use treatment program, and feels like there is a need for more 55+ groups. She asked me to share this information and the value with supervisors/counselor.   She let this writer know that her PETh orders have  and is wondering if she should have this completed again.     Current status: Stable  Coping: Appropriate   Services Needed/Recommended: None identified at this time.    Financial   Income: Virginia receives social security disability. Her " spouse, Albino lost his job last year and is receiving unemployment at this time.   Impact of transplant on income: No impact identified at this time.   Insurance and medication coverage: Medicare and BCBS supplement.    Financial concerns: Virginia reports bills from last year when they switched from [employer] to Medicare. I sent a message to our financial counselor re: this. She also has been unable to get her infusions as they were going to bill this under Part D instead of Part B.   Resources needed: None at this time. Message sent to financial counselor.     Education provided by SW: Virginia feels well informed about the transplant process, and believes that everything is on track.     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 She completed the recommended intensive outpatient program through [facility]. She spoke very highly of the program and presented with insight into her previous substance use and coping skills. She has had negative PETh's since the last one on 1/20/2023.     Virginia has adequate finances and health insurance for transplant, and an intact support system. [place] is an acceptable candidate from a psychosocial perspective.     This writer will remain available to assist patient throughout the evaluation process and will follow patient through transplant if she is listed.  It was a pleasure to evaluate this patient for liver transplant.     RODGER Newby, [facility]  Liver Transplant   [facility]  Phone: [phone]

## 2024-02-07 NOTE — PROGRESS NOTES
Virginia called to report that she was unable to get her labs completed because her labs .    Reorder standing labs (CBC, CRP, LFTs)    Spoke with [facility] and ask them to run standing labs.

## 2024-02-08 ENCOUNTER — TELEPHONE (OUTPATIENT)
Dept: FAMILY MEDICINE | Facility: CLINIC | Age: 59
End: 2024-02-08
Payer: MEDICARE

## 2024-02-08 NOTE — TELEPHONE ENCOUNTER
Prior Authorization Retail Medication Request    Medication/Dose: Mounjaro 2.5MG/0.5ML Pen Injectors  Diagnosis and ICD code (if different than what is on RX):  NA  New/renewal/insurance change PA/secondary ins. PA:  Previously Tried and Failed:  NA  Rationale:  NA    Insurance   Primary: Medicare  Insurance ID:  [id number]     Secondary (if applicable):[place]BS OF [place]  Insurance ID:  [id number]     Pharmacy Information (if different than what is on RX)  Name:  [facility]   Phone:  [phone]  Fax:[phone]

## 2024-02-08 NOTE — RESULT ENCOUNTER NOTE
Dear [place],     -Microalbumin (urine protein) test is normal.  ADVISE: rechecking this annually.      Thank you,     Linda Macdonald, APRN, CNP  [facility]

## 2024-02-09 ENCOUNTER — DOCUMENTATION ONLY (OUTPATIENT)
Dept: TRANSPLANT | Facility: CLINIC | Age: 59
End: 2024-02-09
Payer: MEDICARE

## 2024-02-09 DIAGNOSIS — C22.0 HCC (HEPATOCELLULAR CARCINOMA) (H): Primary | ICD-10-CM

## 2024-02-09 NOTE — PROGRESS NOTES
[facility] LIVER TUMOR BOARD NOTE     DATE OF TUMOR BOARD: February 9, 2024      SCAN REVIEWED: 2/5/2024 MRI, Reviewed by Dr. Dusty Thomas    Discussion:   -Treatment site, peripheral enhancement, looks stable. Area measured 2.6 X 1.6, had arterial enhancement and restricted diffusion, no washout. No change.  -Looks like there was altered enhancement in pre-treatment scans.  -LR-TR equivocal  -No new lesions seen  -Within Joshua criteria    Plan:  -Repeat MRI in 3 months

## 2024-02-09 NOTE — TELEPHONE ENCOUNTER
Spoke with [place], discuss hospital base infusion vs site of care infusion.    Pt's previous insurance was restricted to site of care. Pt started getting her infusion through [facility]. Pt's new insurance is Medicare. With Medicare and using [facility] pt will have a $2300 co-pay which pt does not want. Advice pt we can transition pt over to Hospital base infusion. This will change the co-pay amount.    Discuss the PA process for our team to start. Gave pt contact# for [facility]. Pt states she use to go there until she was required to go with [facility].    Pt agree to schedule appt with [facility]. She wanted to let [facility] know she will be transitioning to Hospital infusion and will call or message writer with ok to seek PA for Hospital base infusion.

## 2024-02-13 ENCOUNTER — TELEPHONE (OUTPATIENT)
Dept: GASTROENTEROLOGY | Facility: CLINIC | Age: 59
End: 2024-02-13

## 2024-02-13 ENCOUNTER — OFFICE VISIT (OUTPATIENT)
Dept: GASTROENTEROLOGY | Facility: CLINIC | Age: 59
End: 2024-02-13
Payer: MEDICARE

## 2024-02-13 ENCOUNTER — MYC MEDICAL ADVICE (OUTPATIENT)
Dept: GASTROENTEROLOGY | Facility: CLINIC | Age: 59
End: 2024-02-13

## 2024-02-13 VITALS
HEIGHT: 65 IN | OXYGEN SATURATION: 99 % | HEART RATE: 77 BPM | DIASTOLIC BLOOD PRESSURE: 80 MMHG | SYSTOLIC BLOOD PRESSURE: 125 MMHG | BODY MASS INDEX: 30.24 KG/M2 | WEIGHT: 181.5 LBS

## 2024-02-13 DIAGNOSIS — K50.113 CROHN'S DISEASE OF COLON WITH FISTULA (H): Primary | ICD-10-CM

## 2024-02-13 DIAGNOSIS — K50.119 CROHN'S DISEASE OF PERIANAL REGION WITH COMPLICATION (H): Primary | ICD-10-CM

## 2024-02-13 PROCEDURE — 99214 OFFICE O/P EST MOD 30 MIN: CPT | Mod: GC | Performed by: INTERNAL MEDICINE

## 2024-02-13 NOTE — PROGRESS NOTES
Spoke with Virginia, [place] states that she would like us to start the PA process for infusion at [facility].    Advice pt, she completed her labs on 2/7/24 and does not need to get more labs done today.

## 2024-02-13 NOTE — NURSING NOTE
"Chief Complaint   Patient presents with    Follow Up       Vitals:    02/13/24 1612   BP: 125/80   Pulse: 77   SpO2: 99%   Weight: 82.3 kg (181 lb 8 oz)   Height: 1.651 m (5' 5\")       Body mass index is 30.2 kg/m .    [employer]    "

## 2024-02-13 NOTE — LETTER
2/13/2024         RE: Abiola Matute  [address] [id number]        Dear Colleague,    Thank you for referring your patient, Abiola Matute, to the [facility] [place]. Please see a copy of my visit note below.    IBD CLINIC VISIT    CC/REFERRING MD:  Referred Self  REASON FOR CONSULTATION: Crohn's.     ASSESSMENT/PLAN:  58 year old female with Crohn's with perianal fistula s/p seton and cirrhosis c/b HCC.     1.  Crohn's disease with perianal fistula  Current Medications: Has been off infliximab since 11/2023 due to insurance coverage difficulty.      - Was on Infliximab 5 mg/kg every 4 weeks since 6/2023. Previously 10mg/kg every 4 weeks since 2020. Dose reduced due to high trough level (>34 in 2/2023) and HCC.   Current disease activity: HBI: 9 - unclear how much symptoms related to inflammation vs cirrhosis as she had loose bowel movements while flex sig was normal  Last endoscopic disease activity: 3/1/21: Normal flex sig.   Last radiographic disease activity: Normal MRE 6/16/22    Infliximab history- started May 2019. Dose increase to 10 mg/kg every 8 weeks but had continued active disease, dose increase to 10 mg/kg every 4 weeks 11/2020. Trough level high at >34 in 3/2023 in the setting of new HCC. Dose reduced to 5 mg/kg every 4 weeks in 6/2023 to attempt to achieve a balance between IBD control and not too much immunosuppression in the setting of HCC.  Unfortunately, she switched to Medicare in late 2023 and has unable to obtain coverage for infliximab.  She has been off infliximab since 11/2023.  Medicare finally approved infliximab but at 5 mg/kg every 8 weeks.  She is scheduled to resume infliximab in late Feb 2024.     She is currently being evaluated for liver transplant for cirrhosis with HCC.  The transplant committee recommends endoscopic evaluation of her Crohn's disease prior to listing.  She is scheduled to undergo colonoscopy on  2/27/2024.  Given the recent pause of infliximab, colonoscopy may show disease activity which may delay her liver transplant listing.  If her colonoscopy shows endoscopic remission, we will need to appeal for the previous infliximab every 4-week dosing with a history of disease activity every 8 weeks dosing and perianal disease.     -- Resume infliximab at 5mg/kg every 8 weeks as recently approved.  We will appeal for every 4 weeks dosing after the first infusion  -- Colonoscopy scheduled on 2/27/24 with Dr. Diaz  -- Follow up with CRS for seton exchange (current Seton was placed in 2021)      2. Cirrhosis with HCC   s/p radioembolization by IR 3/2023. Currently being worked up for liver transplant.  Listing is pending colonoscopy for endoscopic disease activity assessment as described above.  -- Follow-up with Dr. Cervantes in the liver clinic and IR.       3. Umbilical drainage: Eval with dermatology presumed psoriasis.  This is chronic and draining is minimal to mild at a small area that appears like a paper cut.  There is no evidence of fistula on MRE.       4. IBD health maintenance  - MTM referral sent       Return to clinic in 6 months with Dr. Schilling. Earlier if significant findings on colonoscopy.       Patient discussed and seen with staff gastroenterologist Dr. Diaz who agrees with the plan.     Jovita Fuller MD PhD  GI Fellow   [phone]        IBD HISTORY  Age at diagnosis: 53 (4/2019)  Extent of disease: miguel angel-anal, anal  Disease phenotype: Miguel Angel-anal fistula  Miguel Angel-anal disease: Yes  Prior IBD surgeries: No. Seton placements  Prior IBD Medications: None    DRUG MONITORING  TPMT enzyme activity:     6-TGN/6-MMPN levels:    Biologic concentration:  10/2/2019: IFX 0.8, anti-IFX antibodies: 451 (Esoterix)  1/22/2020 infliximab level 2.3, no antibodies (this is an 8-week trough at 10 mg/kg)  3/4/21 IFX 25.1, no antibodies (4 week trough on 10mg/kg)  2/27/2023 IFX >34, no antibody (4 week trough on  10mg/kg)    sIBDQ:   IBDQ Score Date IBDQ - Total Score  (Higher score better)   6/13/2023   1:28 PM 45   5/31/2023   3:26 PM 51   9/30/2022   6:54 AM 49       HPI:   She switched insurance to Medicare last year.  Since then, she has had lots of difficulty with insurance coverage on medical appointments procedures and medications.  Because of that, she has been off infliximab she feels her bowel movements are relatively stable but some changes.  She describes 2 days a week, she has 3-5 bowel movements and feels constipated.  For 2 days a week, she had 4-8 loose stools with urgency.  The rest of the week and she feels she is at her normal baseline.  No abdominal pain.  Energy level is similar.  She takes care of her 2 young grandchildren, age 1 and 2, 2 days a week.  While she enjoys that, she feels tired again of the day.     She continues to have small drainage around her umbilicus and tailbone at what appears to be a small cut in the skin.  This is a chronic.  She also has canker sores to be worse while she is off infliximab.      HBI:  Overall patient well being (prior day): 1 (below average)  Abdominal pain (prior day): 0 (None)  Number of liquid or soft stools (prior day): 6 (1 point per stool)  Abdominal mass on exam: 0 (None)  Complications (1 point for each):   Arthralgia  Aphthous ulcers     Remission <5  Mild activity 5-7  Moderate activity 8-16  Severe > 16    ROS:    Constitutional, HEENT, cardiovascular, pulmonary, GI, , musculoskeletal, neuro, skin, endocrine and psych systems are negative, except as otherwise noted.     PERTINENT PAST MEDICAL HISTORY:  Past Medical History:   Diagnosis Date    Alcohol abuse     Alcoholic cirrhosis of liver with ascites     Anal fistula     Atypical ductal hyperplasia of breast 09/10/2010    ERT not recommended -left - and flat epithelial atypia-scheduled for breast biopsy 9/17/2010     Bifid uvula     Cholelithiasis     Contact perianal dermatitis and other eczema      recurrent - clobetasol     Crohn disease     Fear of flying      - gets Ativan prn.     HCC (hepatocellular carcinoma) (H) 2/1/2023    Hypertension     IBS (irritable bowel syndrome)        PREVIOUS SURGERIES:  Past Surgical History:   Procedure Laterality Date    BIOPSY ANAL N/A 3/28/2019    anal biopsy and culure placement of seton - Dr Fleming    BIOPSY BREAST Left 09/17/2010    - scheduled with Dr. Varma     BIOPSY LIVER  2019    COLONOSCOPY  2006    COLONOSCOPY N/A 12/23/2014    Procedure: COMBINED COLONOSCOPY, SINGLE OR MULTIPLE BIOPSY/POLYPECTOMY BY BIOPSY;  Surgeon: Diane Fleming MD;  Location:  GI    COLONOSCOPY N/A 12/5/2019    Procedure: COLONOSCOPY, WITH POLYPECTOMY AND BIOPSY;  Surgeon: Farhan Schilling MD;  Location: [facility] GI    ENDOSCOPIC RETROGRADE CHOLANGIOPANCREATOGRAM N/A 4/24/2020    Procedure: ENDOSCOPIC RETROGRADE CHOLANGIOPANCREATOGRAPHY WITH, sledge removal,sphincterotomy, stent in gallbladder and pancreatic duct stent, and balloon dilation;  Surgeon: Guru Isac Kraft MD;  Location: [facility] OR    ESOPHAGOSCOPY, GASTROSCOPY, DUODENOSCOPY (EGD), COMBINED N/A 12/5/2019    Procedure: ESOPHAGOGASTRODUODENOSCOPY (EGD);  Surgeon: Farhan Schilling MD;  Location: [facility] GI    ESOPHAGOSCOPY, GASTROSCOPY, DUODENOSCOPY (EGD), COMBINED N/A 5/11/2023    Procedure: Esophagoscopy, gastroscopy, duodenoscopy (EGD), combined;  Surgeon: Jeannette Cervantes MD;  Location: [facility] GI    EXAM UNDER ANESTHESIA ANUS N/A 3/28/2019    Procedure: EXAM UNDER ANESTHESIA ANUS;  Surgeon: Diane Fleming MD;  Location:  OR    EXAM UNDER ANESTHESIA ANUS N/A 2/26/2021    Procedure: EXAM UNDER ANESTHESIA OF ANUS, SETON PLACEMENT, EXCISION OF SKIN BRIDGE;  Surgeon: Avtar Nam MD;  Location: [facility] OR    EXAM UNDER ANESTHESIA ANUS N/A 1/6/2023    Procedure: EXAM UNDER ANESTHESIA, ANUS, SETON EXCHANGE, partial fistulotomy;  Surgeon: Mary Dugan MD;  Location: [facility] OR     HYSTERECTOMY, VAGINAL  2006    with Dr. Licha Zhou - with BSO for fibroids     IR GALLBLADDER DRAIN PLACEMENT  4/22/2020    IR SIRT (SELECTIVE INTERNAL RADIO THERAPY)  2/21/2023    IR VISCERAL ANGIOGRAM  2/21/2023    IR VISCERAL EMBOLIZATION  2/27/2023    LAPAROSCOPIC ABLATION LIVER TUMOR N/A 8/29/2023    Procedure: Diagnostic Laparoscopy, Laparoscopic microwave ablation of liver tumor intraoperative ultrasound of liver, lysis of adhesions 1 hour,;  Surgeon: Albino Saavedra MD;  Location: [facility] OR    OPEN REDUCTION INTERNAL FIXATION ANKLE Left at age 27    plates and screws removed at age 36    Pelviscopy with removal of bilateral hydrosalpinges.  04/15/2010    ZZC APPENDECTOMY  at age 14       PREVIOUS ENDOSCOPY:  3/7/19: Flex sig: Colon and rectum appeared normal. 2 large deep ulcers extending from the anal cnaal to left perianal area.     10/23/18: Colonoscopy: Endoscopically normal ileum.  Mild pan colonic congestion with contact friability thought to be related to portal hypertension and thrombocytopenia.  Prior anal verge ulcer is healed    12/23/14: Colonoscopy: Perianal skin tag noted.  Ileum normal, colon normal.    ALLERGIES:     Allergies   Allergen Reactions    Fish Oil      Redness and itching around eye area only - went away when fish oil capsules stopped     Metronidazole      pain/itching    Ppd [Tuberculin Purified Protein Derivative]     Sulfa Antibiotics      hives    Terbinafine And Related      Lamisil = mild urticarial reaction       PERTINENT MEDICATIONS:    Current Outpatient Medications:     ammonium lactate (AMLACTIN) 12 % external cream, Apply topically 2 times daily, Disp: 385 g, Rfl: 3    clobetasol (TEMOVATE) 0.05 % external cream, Apply to AA BID x 1-2 weeks then PRN. Do not apply to face., Disp: 60 g, Rfl: 3    furosemide (LASIX) 40 MG tablet, TAKE 1 TABLET BY MOUTH  DAILY (Patient taking differently: Take 40 mg by mouth every morning), Disp: 90 tablet, Rfl: 3    Multiple  Vitamins-Minerals (MULTIVITAMIN & MINERAL PO), Take 1 tablet by mouth every morning, Disp: , Rfl:     spironolactone (ALDACTONE) 100 MG tablet, Take 1 tablet (100 mg) by mouth every morning, Disp: 90 tablet, Rfl: 3    tirzepatide (MOUNJARO) 2.5 MG/0.5ML pen, Inject 2.5 mg Subcutaneous every 7 days for 28 days, Disp: 2 mL, Rfl: 0    tirzepatide (MOUNJARO) 5 MG/0.5ML pen, Inject 5 mg Subcutaneous every 7 days, Disp: 2 mL, Rfl: 1    triamcinolone (KENALOG) 0.1 % external cream, Apply to AA BID x 1-2 week then PRN only, Disp: 80 g, Rfl: 3    inFLIXimab (REMICADE) 100 MG injection, Inject into the vein every 28 (twenty-eight) days Last dose 8/7/23 (Patient not taking: Reported on 2/5/2024), Disp:  , Rfl:     UNABLE TO FIND, Take 1 tablet by mouth every morning MEDICATION NAME: Turkey Tail, Disp: , Rfl:   No current facility-administered medications for this visit.    Facility-Administered Medications Ordered in Other Visits:     BREEZA Lemon-Lime flavored oral liquid for Neutral Abdominal/Pelvic Imaging 1,000 mL, 1,000 mL, Oral, Once, Farhan Schilling MD    hyoscyamine (LEVSIN/SL) sublingual tablet 125 mcg, 125 mcg, Sublingual, Q4H PRN, Farhan Schilling MD    SOCIAL HISTORY:  Social History     Socioeconomic History    Marital status:      Spouse name: Albino    Number of children: 3    Years of education: 14    Highest education level: Not on file   Occupational History    Occupation: unemployed   Tobacco Use    Smoking status: Never     Passive exposure: Never    Smokeless tobacco: Never   Vaping Use    Vaping Use: Never used   Substance and Sexual Activity    Alcohol use: Not Currently    Drug use: No    Sexual activity: Yes     Partners: Male     Birth control/protection: Post-menopausal     Comment: husb had vasectomy   Other Topics Concern     Service No    Blood Transfusions No    Caffeine Concern No     Comment: rarely drinks caffeine    Occupational Exposure Not Asked    Hobby Hazards Not  Asked    Sleep Concern Not Asked    Stress Concern Not Asked    Weight Concern Not Asked    Special Diet Not Asked    Back Care Not Asked    Exercise Yes     Comment: does a lot of walking - 4x/week    Bike Helmet Not Asked    Seat Belt Yes     Comment: always    Self-Exams Yes     Comment: SBE encouraged monthly    Parent/sibling w/ CABG, MI or angioplasty before 65F 55M? Yes   Social History Narrative    calcium - drinks 5-6 large glasses skim milk/day    flex sig/colonoscopy -at age 49    sun precautions - discussed    mammogram - needs every 2 years in her 30's, then yearly from then on    Td booster - 9/99 and 4/27/2010    pneumovax -at age 59    DEXA -when perimenopausal    stool hemoccults - every year after age 40    ASA- start at age 40    mulvitamin - encouraged     Social Determinants of Health     Financial Resource Strain: Low Risk  (2/4/2024)    Financial Resource Strain     Within the past 12 months, have you or your family members you live with been unable to get utilities (heat, electricity) when it was really needed?: No   Food Insecurity: Low Risk  (2/4/2024)    Food Insecurity     Within the past 12 months, did you worry that your food would run out before you got money to buy more?: No     Within the past 12 months, did the food you bought just not last and you didn t have money to get more?: No   Transportation Needs: Low Risk  (2/4/2024)    Transportation Needs     Within the past 12 months, has lack of transportation kept you from medical appointments, getting your medicines, non-medical meetings or appointments, work, or from getting things that you need?: No   Physical Activity: Unknown (9/15/2020)    Exercise Vital Sign     Days of Exercise per Week: 0 days     Minutes of Exercise per Session: Not on file   Stress: Stress Concern Present (9/15/2020)    [employer] - Occupational Stress Questionnaire     Feeling of Stress : Rather much   Social Connections:  "Unknown (9/15/2020)    Social Connection and Isolation Panel [NHANES]     Frequency of Communication with Friends and Family: More than three times a week     Frequency of Social Gatherings with Friends and Family: More than three times a week     Attends Hoahaoism Services: Not on file     Active Member of Clubs or Organizations: Not on file     Attends Club or Organization Meetings: Not on file     Marital Status: Not on file   Interpersonal Safety: Low Risk  (2/7/2024)    Interpersonal Safety     Do you feel physically and emotionally safe where you currently live?: Yes     Within the past 12 months, have you been hit, slapped, kicked or otherwise physically hurt by someone?: No     Within the past 12 months, have you been humiliated or emotionally abused in other ways by your partner or ex-partner?: No   Housing Stability: Low Risk  (2/4/2024)    Housing Stability     Do you have housing? : Yes     Are you worried about losing your housing?: No       FAMILY HISTORY:  Family History   Problem Relation Age of Onset    Breast Cancer Mother     Gastrointestinal Disease Mother     Heart Disease Father 57    Hypertension Maternal Grandmother     Substance Abuse Maternal Grandfather     Diabetes Maternal Grandfather     Diabetes Paternal Grandmother     Heart Disease Paternal Grandfather     Cancer Sister     Skin Cancer Sister     Substance Abuse Maternal Uncle     Substance Abuse Maternal Uncle     Suicide Niece     Suicide Niece     Anesthesia Reaction No family hx of     Thrombosis No family hx of        Past/family/social history reviewed and no changes    PHYSICAL EXAMINATION:  Constitutional: aaox3, cooperative, pleasant, not dyspneic/diaphoretic, no acute distress  Vitals reviewed: /80   Pulse 77   Ht 1.651 m (5' 5\")   Wt 82.3 kg (181 lb 8 oz)   LMP 05/31/2006   SpO2 99%   BMI 30.20 kg/m    Wt:   Wt Readings from Last 2 Encounters:   02/13/24 82.3 kg (181 lb 8 oz)   02/07/24 83.9 kg (185 lb)    "   Constitutional - general appearance is well and in no acute distress. Body habitus normal  Eyes - No redness or discharge  Heart: RRR  Respiratory - No cough, unlabored breathing  Musculoskeletal - range of motion intact: Neck and arms  Skin - a small area around umbilicus appears to be slightly red, no drainage seen   Neurological - No focal deficits  Psychiatric - No anxiety, alert & oriented     Attestation signed by Michelle Diaz MD at 2/20/2024  3:45 PM:  I performed a history and physical examination of the above patient and discussed the management with Dr. Fuller on 2/13/2024. I reviewed the note and there are no changes to the past medical, family or social history.  A complete 10 point review of systems was obtained. Please see the HPI for pertinent positives and negatives. All other systems were reviewed and were found to be negative.     I agree with the documented findings and plan of care as outlined.    Michelle Diaz MD  GI Attending  Pager: [phone]      Again, thank you for allowing me to participate in the care of your patient.      Sincerely,    Jovita Fuller MD

## 2024-02-13 NOTE — PATIENT INSTRUCTIONS
We will proceed with colonoscopy as scheduled in 2 weeks  We will work on to see if we can change to your infliximab infusion schedule  We will reach out to colorectal for the plan of seton exchange

## 2024-02-13 NOTE — TELEPHONE ENCOUNTER
Pre visit planning completed.      Procedure details:    Patient scheduled for Colonoscopy  on 2/27/24.     Arrival time: 0945. Procedure time 1045    Pre op exam needed? N/A    Facility location: [facility]; [address] [place] [address], [address] Floor, [place] [address]    Sedation type: MAC    Indication for procedure:   Crohn's disease of large intestine with fistula            Chart review:     Electronic implanted devices? No    Recent diagnosis of diverticulitis within the last 6 weeks? No    Diabetic? No    Diabetic medication HOLDING recommendations: (if applicable)  Oral diabetic medications: No  Diabetic injectables: No  Insulin: No      Medication review:    Anticoagulants? No    NSAIDS? No    Other medication HOLDING recommendations:  Weight loss injectable medication: Mounjaro (Tirzepatide).  Weekly dosing of medication.  Hold 7 days before procedure.  Follow up with managing provider.       Prep for procedure:     Bowel prep recommendation: Standard Miralax   Due to:  standard bowel prep.    Prep instructions sent via [employer]       Susan Wynn RN  Endoscopy Procedure Pre Assessment RN  [phone] option 4

## 2024-02-13 NOTE — TELEPHONE ENCOUNTER
Spoke with [place] and discuss the plan to have her keep her infusion on 2/29/24. Once she complete will seek escalation appeals for [id number]. Ask pt to see if another infusion facility has an earlier appt.

## 2024-02-13 NOTE — TELEPHONE ENCOUNTER
Sarah Gatica Montanna T, RN; [facility], [facility]  That is correct, every 8 week dosing is approved and pt would be ok to proceed.    Thank you  Sarah

## 2024-02-14 NOTE — TELEPHONE ENCOUNTER
Pre assessment completed for upcoming procedure.      Procedure details:    Patient scheduled for Colonoscopy  on 2.27.24.     Arrival time: 0945. Procedure time 1045    Pre op exam needed? N/A    Facility location: [facility]; [address] [place] [address], [address] Floor, [place] [address]    Sedation type: MAC    Indication for procedure: Crohn's disease of large intestine with fistula     COVID policy reviewed.    Designated  policy reviewed. Instructed to have someone stay 24 hours post procedure.       Chart review:     Electronic implanted devices? No    Recent diagnosis of diverticulitis within the last 6 weeks?  No    Diabetic? No        Medication review:    Anticoagulants? No    NSAIDS? No    Other medication HOLDING recommendations:  N/A  Mounjaro has not been taken, patient states if she starts this, will be after the colonoscopy    Prep for procedure:     Bowel prep recommendation: Standard Miralax  Due to: standard bowel prep.    Prep instructions sent via [employer]     Reviewed procedure prep instructions.     Patient verbalized understanding and had no questions or concerns at this time.        Blank Montiel RN  Endoscopy Procedure Pre Assessment RN  [phone] option 4

## 2024-02-14 NOTE — PROGRESS NOTES
IBD CLINIC VISIT    CC/REFERRING MD:  Referred Self  REASON FOR CONSULTATION: Crohn's.     ASSESSMENT/PLAN:  58 year old female with Crohn's with perianal fistula s/p seton and cirrhosis c/b HCC.     1.  Crohn's disease with perianal fistula  Current Medications: Has been off infliximab since 11/2023 due to insurance coverage difficulty.      - Was on Infliximab 5 mg/kg every 4 weeks since 6/2023. Previously 10mg/kg every 4 weeks since 2020. Dose reduced due to high trough level (>34 in 2/2023) and HCC.   Current disease activity: HBI: 9 - unclear how much symptoms related to inflammation vs cirrhosis as she had loose bowel movements while flex sig was normal  Last endoscopic disease activity: 3/1/21: Normal flex sig.   Last radiographic disease activity: Normal MRE 6/16/22    Infliximab history- started May 2019. Dose increase to 10 mg/kg every 8 weeks but had continued active disease, dose increase to 10 mg/kg every 4 weeks 11/2020. Trough level high at >34 in 3/2023 in the setting of new HCC. Dose reduced to 5 mg/kg every 4 weeks in 6/2023 to attempt to achieve a balance between IBD control and not too much immunosuppression in the setting of HCC.  Unfortunately, she switched to Medicare in late 2023 and has unable to obtain coverage for infliximab.  She has been off infliximab since 11/2023.  Medicare finally approved infliximab but at 5 mg/kg every 8 weeks.  She is scheduled to resume infliximab in late Feb 2024.     She is currently being evaluated for liver transplant for cirrhosis with HCC.  The transplant committee recommends endoscopic evaluation of her Crohn's disease prior to listing.  She is scheduled to undergo colonoscopy on 2/27/2024.  Given the recent pause of infliximab, colonoscopy may show disease activity which may delay her liver transplant listing.  If her colonoscopy shows endoscopic remission, we will need to appeal for the previous infliximab every 4-week dosing with a history of disease  activity every 8 weeks dosing and perianal disease.     -- Resume infliximab at 5mg/kg every 8 weeks as recently approved.  We will appeal for every 4 weeks dosing after the first infusion  -- Colonoscopy scheduled on 2/27/24 with Dr. Diaz  -- Follow up with [facility] for seton exchange (current Seton was placed in 2021)      2. Cirrhosis with HCC   s/p radioembolization by IR 3/2023. Currently being worked up for liver transplant.  Listing is pending colonoscopy for endoscopic disease activity assessment as described above.  -- Follow-up with Dr. Cervantes in the liver clinic and IR.       3. Umbilical drainage: Eval with dermatology presumed psoriasis.  This is chronic and draining is minimal to mild at a small area that appears like a paper cut.  There is no evidence of fistula on MRE.       4. IBD health maintenance  - MTM referral sent       Return to clinic in 6 months with Dr. Schilling. Earlier if significant findings on colonoscopy.       Patient discussed and seen with staff gastroenterologist Dr. Diaz who agrees with the plan.     Jovita Fuller MD PhD  GI Fellow   [phone]        IBD HISTORY  Age at diagnosis: 53 (4/2019)  Extent of disease: miguel angel-anal, anal  Disease phenotype: Miguel Angel-anal fistula  Miguel Angel-anal disease: Yes  Prior IBD surgeries: No. Seton placements  Prior IBD Medications: None    DRUG MONITORING  TPMT enzyme activity:     6-TGN/6-MMPN levels:    Biologic concentration:  10/2/2019: IFX 0.8, anti-IFX antibodies: 451 (Esoterix)  1/22/2020 infliximab level 2.3, no antibodies (this is an 8-week trough at 10 mg/kg)  3/4/21 IFX 25.1, no antibodies (4 week trough on 10mg/kg)  2/27/2023 IFX >34, no antibody (4 week trough on 10mg/kg)    sIBDQ:   IBDQ Score Date IBDQ - Total Score  (Higher score better)   6/13/2023   1:28 PM 45   5/31/2023   3:26 PM 51   9/30/2022   6:54 AM 49       HPI:   She switched insurance to Medicare last year.  Since then, she has had lots of difficulty with insurance coverage on  medical appointments procedures and medications.  Because of that, she has been off infliximab she feels her bowel movements are relatively stable but some changes.  She describes 2 days a week, she has 3-5 bowel movements and feels constipated.  For 2 days a week, she had 4-8 loose stools with urgency.  The rest of the week and she feels she is at her normal baseline.  No abdominal pain.  Energy level is similar.  She takes care of her 2 young grandchildren, age 1 and 2, 2 days a week.  While she enjoys that, she feels tired again of the day.     She continues to have small drainage around her umbilicus and tailbone at what appears to be a small cut in the skin.  This is a chronic.  She also has canker sores to be worse while she is off infliximab.      HBI:  Overall patient well being (prior day): 1 (below average)  Abdominal pain (prior day): 0 (None)  Number of liquid or soft stools (prior day): 6 (1 point per stool)  Abdominal mass on exam: 0 (None)  Complications (1 point for each):   Arthralgia  Aphthous ulcers     Remission <5  Mild activity 5-7  Moderate activity 8-16  Severe > 16    ROS:    Constitutional, HEENT, cardiovascular, pulmonary, GI, , musculoskeletal, neuro, skin, endocrine and psych systems are negative, except as otherwise noted.     PERTINENT PAST MEDICAL HISTORY:  Past Medical History:   Diagnosis Date    Alcohol abuse     Alcoholic cirrhosis of liver with ascites     Anal fistula     Atypical ductal hyperplasia of breast 09/10/2010    ERT not recommended -left - and flat epithelial atypia-scheduled for breast biopsy 9/17/2010     Bifid uvula     Cholelithiasis     Contact perianal dermatitis and other eczema     recurrent - clobetasol     Crohn disease     Fear of flying      - gets Ativan prn.     HCC (hepatocellular carcinoma) (H) 2/1/2023    Hypertension     IBS (irritable bowel syndrome)        PREVIOUS SURGERIES:  Past Surgical History:   Procedure Laterality Date    BIOPSY ANAL N/A  3/28/2019    anal biopsy and culure placement of seton - Dr Fleming    BIOPSY BREAST Left 09/17/2010    - scheduled with Dr. Varma     BIOPSY LIVER  2019    COLONOSCOPY  2006    COLONOSCOPY N/A 12/23/2014    Procedure: COMBINED COLONOSCOPY, SINGLE OR MULTIPLE BIOPSY/POLYPECTOMY BY BIOPSY;  Surgeon: Diane Fleming MD;  Location:  GI    COLONOSCOPY N/A 12/5/2019    Procedure: COLONOSCOPY, WITH POLYPECTOMY AND BIOPSY;  Surgeon: Farhan Schilling MD;  Location: [facility]    ENDOSCOPIC RETROGRADE CHOLANGIOPANCREATOGRAM N/A 4/24/2020    Procedure: ENDOSCOPIC RETROGRADE CHOLANGIOPANCREATOGRAPHY WITH, sledge removal,sphincterotomy, stent in gallbladder and pancreatic duct stent, and balloon dilation;  Surgeon: Guru Isac Kraft MD;  Location: [facility] OR    ESOPHAGOSCOPY, GASTROSCOPY, DUODENOSCOPY (EGD), COMBINED N/A 12/5/2019    Procedure: ESOPHAGOGASTRODUODENOSCOPY (EGD);  Surgeon: Farhan Schilling MD;  Location: [facility] GI    ESOPHAGOSCOPY, GASTROSCOPY, DUODENOSCOPY (EGD), COMBINED N/A 5/11/2023    Procedure: Esophagoscopy, gastroscopy, duodenoscopy (EGD), combined;  Surgeon: Jeannette Cervantes MD;  Location: [facility]    EXAM UNDER ANESTHESIA ANUS N/A 3/28/2019    Procedure: EXAM UNDER ANESTHESIA ANUS;  Surgeon: Diane Fleming MD;  Location:  OR    EXAM UNDER ANESTHESIA ANUS N/A 2/26/2021    Procedure: EXAM UNDER ANESTHESIA OF ANUS, SETON PLACEMENT, EXCISION OF SKIN BRIDGE;  Surgeon: Avtar Nam MD;  [place] [facility] [place]    EXAM UNDER ANESTHESIA ANUS N/A 1/6/2023    Procedure: EXAM UNDER ANESTHESIA, ANUS, SETON EXCHANGE, partial fistulotomy;  Surgeon: Mary Dugan MD;  [place] [facility] [place]    HYSTERECTOMY, VAGINAL  2006    with Dr. Licha Zhou - with BSO for fibroids     IR GALLBLADDER DRAIN PLACEMENT  4/22/2020    IR SIRT (SELECTIVE INTERNAL RADIO THERAPY)  2/21/2023    IR VISCERAL ANGIOGRAM  2/21/2023    IR VISCERAL EMBOLIZATION  2/27/2023    LAPAROSCOPIC ABLATION  LIVER TUMOR N/A 8/29/2023    Procedure: Diagnostic Laparoscopy, Laparoscopic microwave ablation of liver tumor intraoperative ultrasound of liver, lysis of adhesions 1 hour,;  Surgeon: Albino Saavedra MD;  Location: [facility] OR    OPEN REDUCTION INTERNAL FIXATION ANKLE Left at age 27    plates and screws removed at age 36    Pelviscopy with removal of bilateral hydrosalpinges.  04/15/2010    ZZC APPENDECTOMY  at age 14       PREVIOUS ENDOSCOPY:  3/7/19: Flex sig: Colon and rectum appeared normal. 2 large deep ulcers extending from the anal cnaal to left perianal area.     10/23/18: Colonoscopy: Endoscopically normal ileum.  Mild pan colonic congestion with contact friability thought to be related to portal hypertension and thrombocytopenia.  Prior anal verge ulcer is healed    12/23/14: Colonoscopy: Perianal skin tag noted.  Ileum normal, colon normal.    ALLERGIES:     Allergies   Allergen Reactions    Fish Oil      Redness and itching around eye area only - went away when fish oil capsules stopped     Metronidazole      pain/itching    Ppd [Tuberculin Purified Protein Derivative]     Sulfa Antibiotics      hives    Terbinafine And Related      Lamisil = mild urticarial reaction       PERTINENT MEDICATIONS:    Current Outpatient Medications:     ammonium lactate (AMLACTIN) 12 % external cream, Apply topically 2 times daily, Disp: 385 g, Rfl: 3    clobetasol (TEMOVATE) 0.05 % external cream, Apply to AA BID x 1-2 weeks then PRN. Do not apply to face., Disp: 60 g, Rfl: 3    furosemide (LASIX) 40 MG tablet, TAKE 1 TABLET BY MOUTH  DAILY (Patient taking differently: Take 40 mg by mouth every morning), Disp: 90 tablet, Rfl: 3    Multiple Vitamins-Minerals (MULTIVITAMIN & MINERAL PO), Take 1 tablet by mouth every morning, Disp: , Rfl:     spironolactone (ALDACTONE) 100 MG tablet, Take 1 tablet (100 mg) by mouth every morning, Disp: 90 tablet, Rfl: 3    tirzepatide (MOUNJARO) 2.5 MG/0.5ML pen, Inject 2.5 mg Subcutaneous  every 7 days for 28 days, Disp: 2 mL, Rfl: 0    tirzepatide (MOUNJARO) 5 MG/0.5ML pen, Inject 5 mg Subcutaneous every 7 days, Disp: 2 mL, Rfl: 1    triamcinolone (KENALOG) 0.1 % external cream, Apply to AA BID x 1-2 week then PRN only, Disp: 80 g, Rfl: 3    inFLIXimab (REMICADE) 100 MG injection, Inject into the vein every 28 (twenty-eight) days Last dose 8/7/23 (Patient not taking: Reported on 2/5/2024), Disp:  , Rfl:     UNABLE TO FIND, Take 1 tablet by mouth every morning MEDICATION NAME: Turkey Tail, Disp: , Rfl:   No current facility-administered medications for this visit.    Facility-Administered Medications Ordered in Other Visits:     BREEZA Lemon-Lime flavored oral liquid for Neutral Abdominal/Pelvic Imaging 1,000 mL, 1,000 mL, Oral, Once, Farhan Schilling MD    hyoscyamine (LEVSIN/SL) sublingual tablet 125 mcg, 125 mcg, Sublingual, Q4H PRN, Farhan Schilling MD    SOCIAL HISTORY:  Social History     Socioeconomic History    Marital status:      Spouse name: Albino    Number of children: 3    Years of education: 14    Highest education level: Not on file   Occupational History    Occupation: unemployed   Tobacco Use    Smoking status: Never     Passive exposure: Never    Smokeless tobacco: Never   Vaping Use    Vaping Use: Never used   Substance and Sexual Activity    Alcohol use: Not Currently    Drug use: No    Sexual activity: Yes     Partners: Male     Birth control/protection: Post-menopausal     Comment: husb had vasectomy   Other Topics Concern     Service No    Blood Transfusions No    Caffeine Concern No     Comment: rarely drinks caffeine    Occupational Exposure Not Asked    Hobby Hazards Not Asked    Sleep Concern Not Asked    Stress Concern Not Asked    Weight Concern Not Asked    Special Diet Not Asked    Back Care Not Asked    Exercise Yes     Comment: does a lot of walking - 4x/week    Bike Helmet Not Asked    Seat Belt Yes     Comment: always    Self-Exams Yes      Comment: SBE encouraged monthly    Parent/sibling w/ CABG, MI or angioplasty before 65F 55M? Yes   Social History Narrative    calcium - drinks 5-6 large glasses skim milk/day    flex sig/colonoscopy -at age 49    sun precautions - discussed    mammogram - needs every 2 years in her 30's, then yearly from then on    Td booster - 9/99 and 4/27/2010    pneumovax -at age 59    DEXA -when perimenopausal    stool hemoccults - every year after age 40    ASA- start at age 40    mulvitamin - encouraged     Social Determinants of Health     Financial Resource Strain: Low Risk  (2/4/2024)    Financial Resource Strain     Within the past 12 months, have you or your family members you live with been unable to get utilities (heat, electricity) when it was really needed?: No   Food Insecurity: Low Risk  (2/4/2024)    Food Insecurity     Within the past 12 months, did you worry that your food would run out before you got money to buy more?: No     Within the past 12 months, did the food you bought just not last and you didn t have money to get more?: No   Transportation Needs: Low Risk  (2/4/2024)    Transportation Needs     Within the past 12 months, has lack of transportation kept you from medical appointments, getting your medicines, non-medical meetings or appointments, work, or from getting things that you need?: No   Physical Activity: Unknown (9/15/2020)    Exercise Vital Sign     Days of Exercise per Week: 0 days     Minutes of Exercise per Session: Not on file   Stress: Stress Concern Present (9/15/2020)    [employer] [place] [employer] - Occupational Stress Questionnaire     Feeling of Stress : Rather much   Social Connections: Unknown (9/15/2020)    Social Connection and Isolation Panel [NHANES]     Frequency of Communication with Friends and Family: More than three times a week     Frequency of Social Gatherings with Friends and Family: More than three times a week     Attends Anabaptism Services: Not on file  "    Active Member of Clubs or Organizations: Not on file     Attends Club or Organization Meetings: Not on file     Marital Status: Not on file   Interpersonal Safety: Low Risk  (2/7/2024)    Interpersonal Safety     Do you feel physically and emotionally safe where you currently live?: Yes     Within the past 12 months, have you been hit, slapped, kicked or otherwise physically hurt by someone?: No     Within the past 12 months, have you been humiliated or emotionally abused in other ways by your partner or ex-partner?: No   Housing Stability: Low Risk  (2/4/2024)    Housing Stability     Do you have housing? : Yes     Are you worried about losing your housing?: No       FAMILY HISTORY:  Family History   Problem Relation Age of Onset    Breast Cancer Mother     Gastrointestinal Disease Mother     Heart Disease Father 57    Hypertension Maternal Grandmother     Substance Abuse Maternal Grandfather     Diabetes Maternal Grandfather     Diabetes Paternal Grandmother     Heart Disease Paternal Grandfather     Cancer Sister     Skin Cancer Sister     Substance Abuse Maternal Uncle     Substance Abuse Maternal Uncle     Suicide Niece     Suicide Niece     Anesthesia Reaction No family hx of     Thrombosis No family hx of        Past/family/social history reviewed and no changes    PHYSICAL EXAMINATION:  Constitutional: aaox3, cooperative, pleasant, not dyspneic/diaphoretic, no acute distress  Vitals reviewed: /80   Pulse 77   Ht 1.651 m (5' 5\")   Wt 82.3 kg (181 lb 8 oz)   LMP 05/31/2006   SpO2 99%   BMI 30.20 kg/m    Wt:   Wt Readings from Last 2 Encounters:   02/13/24 82.3 kg (181 lb 8 oz)   02/07/24 83.9 kg (185 lb)      Constitutional - general appearance is well and in no acute distress. Body habitus normal  Eyes - No redness or discharge  Heart: RRR  Respiratory - No cough, unlabored breathing  Musculoskeletal - range of motion intact: Neck and arms  Skin - a small area around umbilicus appears to be " slightly red, no drainage seen   Neurological - No focal deficits  Psychiatric - No anxiety, alert & oriented

## 2024-02-15 ENCOUNTER — DOCUMENTATION ONLY (OUTPATIENT)
Dept: GASTROENTEROLOGY | Facility: CLINIC | Age: 59
End: 2024-02-15
Payer: MEDICARE

## 2024-02-15 NOTE — PROGRESS NOTES
INCOMING FAX:    Facility:   [facility]  [address] [place] [address]  PH: [phone]   Fax: [phone]      What:   Prescriber orders, inflectra. Period of service covered 2/14/2024      Routed document to Remi HAWLEY and Dr. Schilling to review and sign.     Form reviewed and signed by provider. Faxed back to [facility] at [phone]. Copy Scanned into patient chart.    Zayra Johnson LPN

## 2024-02-16 NOTE — PROGRESS NOTES
Reorder Therapy plan to reflect PA obtain on 2/13/24 back to Remicade 5mg/kg [id number] per FDA dose as requested by new insurance [employer] requirements.    Will appeal after pt has received overdue medication.    Discussed the above with Dr. Fuller.

## 2024-02-19 NOTE — TELEPHONE ENCOUNTER
Spoke with [place], discuss with [place] that for Medicare Hospital infusion is covered only every 8 weeks. Medicare will not cover any escalation. If pt wants to go to the escalation dose then this will go throught her Part D Pharmacy and there is a $2300 copay for every dose. Pt states she will continue with plan to get infusion on 2/21/24 and get her colonoscopy and monitor her symptoms.

## 2024-02-19 NOTE — TELEPHONE ENCOUNTER
Dr. Fuller,  [facility],  Medicare will not cover any escalation dose less than the FDA approved of every 8weeks. If we want to seek escalation dose then it will need to go through pt's part D pharmacy and there is  $2300 copay every visit. This has been explain to pt.     Pt is in agreement to continue the every 8weeks dosing and monitor her symptoms and will update GI Team    Pt is asking if an ifx level can be done at her next infusion to check the effectiveness of inflectra.    Please advice if ok to order.    Thank you.  Remi

## 2024-02-19 NOTE — TELEPHONE ENCOUNTER
----- Message from Sarah Gatica sent at 2/16/2024  2:09 PM CST -----  Regarding: RE: PA for infusion  Hi there,   If wanting to change to the every 4 week dosing we can't appeal as Medicare does not have that option but I can run it through RX benefit to see if that would be an option.  Thank you   Sarah

## 2024-02-19 NOTE — TELEPHONE ENCOUNTER
Jovita Fuller MD Hokenson, Montanna T, RN [place],    I will hold off on trough level. We need a few doses of infusion before infliximab will reach a steady state. We can order that after her next follow up visit in 6 months (she will have 2 doses by then).

## 2024-02-21 ENCOUNTER — INFUSION THERAPY VISIT (OUTPATIENT)
Dept: INFUSION THERAPY | Facility: CLINIC | Age: 59
End: 2024-02-21
Attending: INTERNAL MEDICINE
Payer: MEDICARE

## 2024-02-21 ENCOUNTER — MEDICAL CORRESPONDENCE (OUTPATIENT)
Dept: HEALTH INFORMATION MANAGEMENT | Facility: CLINIC | Age: 59
End: 2024-02-21

## 2024-02-21 VITALS
RESPIRATION RATE: 16 BRPM | DIASTOLIC BLOOD PRESSURE: 79 MMHG | WEIGHT: 180.2 LBS | HEART RATE: 74 BPM | SYSTOLIC BLOOD PRESSURE: 116 MMHG | TEMPERATURE: 98.2 F | OXYGEN SATURATION: 99 % | BODY MASS INDEX: 29.99 KG/M2

## 2024-02-21 DIAGNOSIS — K50.113 CROHN'S DISEASE OF LARGE INTESTINE WITH FISTULA (H): Primary | ICD-10-CM

## 2024-02-21 DIAGNOSIS — K50.113 CROHN'S DISEASE OF LARGE INTESTINE WITH FISTULA (H): ICD-10-CM

## 2024-02-21 LAB
HOLD SPECIMEN: NORMAL

## 2024-02-21 PROCEDURE — 86140 C-REACTIVE PROTEIN: CPT | Performed by: INTERNAL MEDICINE

## 2024-02-21 PROCEDURE — 36415 COLL VENOUS BLD VENIPUNCTURE: CPT | Performed by: INTERNAL MEDICINE

## 2024-02-21 PROCEDURE — 85025 COMPLETE CBC W/AUTO DIFF WBC: CPT

## 2024-02-21 PROCEDURE — 36415 COLL VENOUS BLD VENIPUNCTURE: CPT

## 2024-02-21 PROCEDURE — 80076 HEPATIC FUNCTION PANEL: CPT | Performed by: INTERNAL MEDICINE

## 2024-02-21 PROCEDURE — 258N000003 HC RX IP 258 OP 636: Performed by: INTERNAL MEDICINE

## 2024-02-21 PROCEDURE — 85652 RBC SED RATE AUTOMATED: CPT | Performed by: INTERNAL MEDICINE

## 2024-02-21 PROCEDURE — 96413 CHEMO IV INFUSION 1 HR: CPT

## 2024-02-21 PROCEDURE — 250N000011 HC RX IP 250 OP 636: Mod: JZ | Performed by: INTERNAL MEDICINE

## 2024-02-21 RX ORDER — ACETAMINOPHEN 325 MG/1
650 TABLET ORAL ONCE
Status: CANCELLED
Start: 2024-07-08 | End: 2024-07-08

## 2024-02-21 RX ORDER — DIPHENHYDRAMINE HYDROCHLORIDE 50 MG/ML
50 INJECTION INTRAMUSCULAR; INTRAVENOUS
Status: CANCELLED
Start: 2024-07-08

## 2024-02-21 RX ORDER — DIPHENHYDRAMINE HCL 25 MG
25 CAPSULE ORAL ONCE
Status: CANCELLED
Start: 2024-07-08 | End: 2024-07-08

## 2024-02-21 RX ORDER — ALBUTEROL SULFATE 90 UG/1
1-2 AEROSOL, METERED RESPIRATORY (INHALATION)
Status: CANCELLED
Start: 2024-07-08

## 2024-02-21 RX ORDER — MEPERIDINE HYDROCHLORIDE 25 MG/ML
25 INJECTION INTRAMUSCULAR; INTRAVENOUS; SUBCUTANEOUS EVERY 30 MIN PRN
Status: CANCELLED | OUTPATIENT
Start: 2024-07-08

## 2024-02-21 RX ORDER — ALBUTEROL SULFATE 0.83 MG/ML
2.5 SOLUTION RESPIRATORY (INHALATION)
Status: CANCELLED | OUTPATIENT
Start: 2024-07-08

## 2024-02-21 RX ORDER — METHYLPREDNISOLONE SODIUM SUCCINATE 125 MG/2ML
125 INJECTION, POWDER, LYOPHILIZED, FOR SOLUTION INTRAMUSCULAR; INTRAVENOUS
Status: CANCELLED
Start: 2024-07-08

## 2024-02-21 RX ORDER — EPINEPHRINE 1 MG/ML
0.3 INJECTION, SOLUTION INTRAMUSCULAR; SUBCUTANEOUS EVERY 5 MIN PRN
Status: CANCELLED | OUTPATIENT
Start: 2024-07-08

## 2024-02-21 RX ADMIN — INFLIXIMAB 400 MG: 100 INJECTION, POWDER, LYOPHILIZED, FOR SOLUTION INTRAVENOUS at 13:56

## 2024-02-21 NOTE — PROGRESS NOTES
Infusion Nursing Note:  Abiola Matute presents today for Remicade.    Patient seen by provider today: No   present during visit today: Not Applicable.    Note: Per patient, she does not need labs today.      Intravenous Access:  Labs drawn without difficulty.  Peripheral IV placed.    Treatment Conditions:  Biological Infusion Checklist:  ~~~ NOTE: If the patient answers yes to any of the questions below, hold the infusion and contact ordering provider or on-call provider.    Have you recently had an elevated temperature, fever, chills, productive cough, coughing for 3 weeks or longer or hemoptysis,  abnormal vital signs, night sweats,  chest pain or have you noticed a decrease in your appetite, unexplained weight loss or fatigue? No  Do you have any open wounds or new incisions? No  Do you have any upcoming hospitalizations or surgeries? Does not include esophagogastroduodenoscopy, colonoscopy, endoscopic retrograde cholangiopancreatography (ERCP), endoscopic ultrasound (EUS), dental procedures or joint aspiration/steroid injections No  Do you currently have any signs of illness or infection or are you on any antibiotics? No  Have you had any new, sudden or worsening abdominal pain? No  Have you or anyone in your household received a live vaccination in the past 4 weeks? Please note: No live vaccines while on biologic/chemotherapy until 6 months after the last treatment. Patient can receive the flu vaccine (shot only), pneumovax and the Covid vaccine. It is optimal for the patient to get these vaccines mid cycle, but they can be given at any time as long as it is not on the day of the infusion. No  Have you recently been diagnosed with any new nervous system diseases (ie. Multiple sclerosis, Guillain [place], seizures, neurological changes) or cancer diagnosis? Are you on any form of radiation or chemotherapy? No  Are you pregnant or breast feeding or do you have plans of pregnancy in the future?  No  Have you been having any signs of worsening depression or suicidal ideations?  (benlysta only) No  Have there been any other new onset medical symptoms? No  Have you had any new blood clots? (IVIG only) No      Post Infusion Assessment:  Patient tolerated infusion without incident.  Blood return noted pre and post infusion.  Site patent and intact, free from redness, edema or discomfort.  No evidence of extravasations.  Access discontinued per protocol.  Biologic Infusion Post Education: Call the triage nurse at your clinic or seek medical attention if you have chills and/or temperature greater than or equal to 100.5, uncontrolled nausea/vomiting, diarrhea, constipation, dizziness, shortness of breath, chest pain, heart palpitations, weakness or any other new or concerning symptoms, questions or concerns.  You cannot have any live virus vaccines prior to or during treatment or up to 6 months post infusion.  If you have an upcoming surgery, medical procedure or dental procedure during treatment, this should be discussed with your ordering physician and your surgeon/dentist.  If you are having any concerning symptom, if you are unsure if you should get your next infusion or wish to speak to a provider before your next infusion, please call your care coordinator or triage nurse at your clinic to notify them so we can adequately serve you.       Discharge Plan:   Discharge instructions reviewed with: Patient.  Patient and/or family verbalized understanding of discharge instructions and all questions answered.  AVS to patient via meQuilibrium.  Patient will go to [place] clinic on  3/28/24 for next appointment.   Patient discharged in stable condition accompanied by: self.  Departure Mode: Ambulatory.      Cori Marte RN

## 2024-02-22 LAB
ALBUMIN SERPL BCG-MCNC: 3.8 G/DL (ref 3.5–5.2)
ALP SERPL-CCNC: 141 U/L (ref 40–150)
ALT SERPL W P-5'-P-CCNC: 48 U/L (ref 0–50)
AST SERPL W P-5'-P-CCNC: 89 U/L (ref 0–45)
BASOPHILS # BLD AUTO: 0 10E3/UL (ref 0–0.2)
BASOPHILS NFR BLD AUTO: 1 %
BILIRUB DIRECT SERPL-MCNC: 0.52 MG/DL (ref 0–0.3)
BILIRUB SERPL-MCNC: 2.2 MG/DL
CRP SERPL-MCNC: 7.63 MG/L
EOSINOPHIL # BLD AUTO: 0.3 10E3/UL (ref 0–0.7)
EOSINOPHIL NFR BLD AUTO: 7 %
ERYTHROCYTE [DISTWIDTH] IN BLOOD BY AUTOMATED COUNT: 14.5 % (ref 10–15)
ERYTHROCYTE [SEDIMENTATION RATE] IN BLOOD BY WESTERGREN METHOD: 48 MM/HR (ref 0–30)
HCT VFR BLD AUTO: 41.8 % (ref 35–47)
HGB BLD-MCNC: 14.1 G/DL (ref 11.7–15.7)
IMM GRANULOCYTES # BLD: 0 10E3/UL
IMM GRANULOCYTES NFR BLD: 0 %
LYMPHOCYTES # BLD AUTO: 0.8 10E3/UL (ref 0.8–5.3)
LYMPHOCYTES NFR BLD AUTO: 18 %
MCH RBC QN AUTO: 34.6 PG (ref 26.5–33)
MCHC RBC AUTO-ENTMCNC: 33.7 G/DL (ref 31.5–36.5)
MCV RBC AUTO: 103 FL (ref 78–100)
MONOCYTES # BLD AUTO: 0.4 10E3/UL (ref 0–1.3)
MONOCYTES NFR BLD AUTO: 9 %
NEUTROPHILS # BLD AUTO: 2.9 10E3/UL (ref 1.6–8.3)
NEUTROPHILS NFR BLD AUTO: 65 %
NRBC # BLD AUTO: 0 10E3/UL
NRBC BLD AUTO-RTO: 0 /100
PLATELET # BLD AUTO: 126 10E3/UL (ref 150–450)
PROT SERPL-MCNC: 8.4 G/DL (ref 6.4–8.3)
RBC # BLD AUTO: 4.08 10E6/UL (ref 3.8–5.2)
WBC # BLD AUTO: 4.4 10E3/UL (ref 4–11)

## 2024-02-22 NOTE — TELEPHONE ENCOUNTER
Retail Pharmacy Prior Authorization Team   Phone: [phone]    PA Initiation    Medication: MOUNJARO 2.5 MG/0.5ML SC SOPN  Insurance Company: [employer] - Phone [phone] Fax [phone]  Pharmacy Filling the Rx: [id number] - [place], [place] - [address] [facility]   Filling Pharmacy Phone: [phone]  Filling Pharmacy Fax:    Start Date: 2/22/2024

## 2024-02-23 NOTE — TELEPHONE ENCOUNTER
PRIOR AUTHORIZATION DENIED    Medication: MOUNJARO 2.5 MG/0.5ML SC SOPN  Insurance Company: [employer] - Phone [phone] Fax [phone]  Denial Date: 2/22/2024  Denial Reason(s):           Appeal Information:         Patient Notified: No

## 2024-02-23 NOTE — TELEPHONE ENCOUNTER
Place call to patient to advise of PA denial. Patient will look into the cost of medication, does not want to cancel as of yet. Will check with pharmacy, and see if there is a goodrx as well.

## 2024-02-26 ENCOUNTER — ANESTHESIA EVENT (OUTPATIENT)
Dept: SURGERY | Facility: AMBULATORY SURGERY CENTER | Age: 59
End: 2024-02-26
Payer: MEDICARE

## 2024-02-26 RX ORDER — EPINEPHRINE 1 MG/ML
0.3 INJECTION, SOLUTION, CONCENTRATE INTRAVENOUS EVERY 5 MIN PRN
Status: CANCELLED | OUTPATIENT
Start: 2024-02-26

## 2024-02-26 RX ORDER — METHYLPREDNISOLONE SODIUM SUCCINATE 125 MG/2ML
125 INJECTION, POWDER, LYOPHILIZED, FOR SOLUTION INTRAMUSCULAR; INTRAVENOUS
Status: CANCELLED
Start: 2024-02-26

## 2024-02-26 RX ORDER — ALBUTEROL SULFATE 90 UG/1
1-2 AEROSOL, METERED RESPIRATORY (INHALATION)
Status: CANCELLED
Start: 2024-02-26

## 2024-02-26 RX ORDER — ALBUTEROL SULFATE 0.83 MG/ML
2.5 SOLUTION RESPIRATORY (INHALATION)
Status: CANCELLED | OUTPATIENT
Start: 2024-02-26

## 2024-02-26 RX ORDER — ACETAMINOPHEN 325 MG/1
650 TABLET ORAL ONCE
Status: CANCELLED
Start: 2024-02-26 | End: 2024-02-26

## 2024-02-26 RX ORDER — DIPHENHYDRAMINE HYDROCHLORIDE 50 MG/ML
50 INJECTION INTRAMUSCULAR; INTRAVENOUS
Status: CANCELLED
Start: 2024-02-26

## 2024-02-26 RX ORDER — DIPHENHYDRAMINE HCL 25 MG
25 CAPSULE ORAL ONCE
Status: CANCELLED
Start: 2024-02-26 | End: 2024-02-26

## 2024-02-26 RX ORDER — MEPERIDINE HYDROCHLORIDE 25 MG/ML
25 INJECTION INTRAMUSCULAR; INTRAVENOUS; SUBCUTANEOUS EVERY 30 MIN PRN
Status: CANCELLED | OUTPATIENT
Start: 2024-02-26

## 2024-02-27 ENCOUNTER — ANESTHESIA (OUTPATIENT)
Dept: SURGERY | Facility: AMBULATORY SURGERY CENTER | Age: 59
End: 2024-02-27
Payer: MEDICARE

## 2024-02-27 ENCOUNTER — PATIENT OUTREACH (OUTPATIENT)
Dept: GASTROENTEROLOGY | Facility: CLINIC | Age: 59
End: 2024-02-27

## 2024-02-27 ENCOUNTER — HOSPITAL ENCOUNTER (OUTPATIENT)
Facility: AMBULATORY SURGERY CENTER | Age: 59
Discharge: HOME | End: 2024-02-27
Attending: INTERNAL MEDICINE
Payer: MEDICARE

## 2024-02-27 VITALS
BODY MASS INDEX: 29.66 KG/M2 | OXYGEN SATURATION: 100 % | HEIGHT: 65 IN | RESPIRATION RATE: 16 BRPM | SYSTOLIC BLOOD PRESSURE: 118 MMHG | TEMPERATURE: 98 F | WEIGHT: 178 LBS | DIASTOLIC BLOOD PRESSURE: 72 MMHG | HEART RATE: 68 BPM

## 2024-02-27 VITALS — HEART RATE: 78 BPM

## 2024-02-27 LAB — COLONOSCOPY: NORMAL

## 2024-02-27 PROCEDURE — 45385 COLONOSCOPY W/LESION REMOVAL: CPT | Performed by: ANESTHESIOLOGY

## 2024-02-27 PROCEDURE — 88305 TISSUE EXAM BY PATHOLOGIST: CPT | Mod: 26 | Performed by: PATHOLOGY

## 2024-02-27 PROCEDURE — 45380 COLONOSCOPY AND BIOPSY: CPT | Mod: 59 | Performed by: INTERNAL MEDICINE

## 2024-02-27 PROCEDURE — 45385 COLONOSCOPY W/LESION REMOVAL: CPT | Performed by: NURSE ANESTHETIST, CERTIFIED REGISTERED

## 2024-02-27 PROCEDURE — 88305 TISSUE EXAM BY PATHOLOGIST: CPT | Mod: TC | Performed by: INTERNAL MEDICINE

## 2024-02-27 PROCEDURE — 45385 COLONOSCOPY W/LESION REMOVAL: CPT | Performed by: INTERNAL MEDICINE

## 2024-02-27 RX ORDER — ONDANSETRON 2 MG/ML
4 INJECTION INTRAMUSCULAR; INTRAVENOUS EVERY 6 HOURS PRN
Status: CANCELLED | OUTPATIENT
Start: 2024-02-27

## 2024-02-27 RX ORDER — LIDOCAINE 40 MG/G
CREAM TOPICAL
Status: DISCONTINUED | OUTPATIENT
Start: 2024-02-27 | End: 2024-02-28 | Stop reason: HOSPADM

## 2024-02-27 RX ORDER — LIDOCAINE HYDROCHLORIDE 20 MG/ML
INJECTION, SOLUTION INFILTRATION; PERINEURAL PRN
Status: DISCONTINUED | OUTPATIENT
Start: 2024-02-27 | End: 2024-02-27

## 2024-02-27 RX ORDER — ONDANSETRON 2 MG/ML
4 INJECTION INTRAMUSCULAR; INTRAVENOUS EVERY 30 MIN PRN
Status: DISCONTINUED | OUTPATIENT
Start: 2024-02-27 | End: 2024-02-28 | Stop reason: HOSPADM

## 2024-02-27 RX ORDER — PROPOFOL 10 MG/ML
INJECTION, EMULSION INTRAVENOUS PRN
Status: DISCONTINUED | OUTPATIENT
Start: 2024-02-27 | End: 2024-02-27

## 2024-02-27 RX ORDER — FLUMAZENIL 0.1 MG/ML
0.2 INJECTION, SOLUTION INTRAVENOUS
Status: CANCELLED | OUTPATIENT
Start: 2024-02-27 | End: 2024-02-28

## 2024-02-27 RX ORDER — PROPOFOL 10 MG/ML
INJECTION, EMULSION INTRAVENOUS CONTINUOUS PRN
Status: DISCONTINUED | OUTPATIENT
Start: 2024-02-27 | End: 2024-02-27

## 2024-02-27 RX ORDER — ONDANSETRON 4 MG/1
4 TABLET, ORALLY DISINTEGRATING ORAL EVERY 6 HOURS PRN
Status: CANCELLED | OUTPATIENT
Start: 2024-02-27

## 2024-02-27 RX ORDER — FENTANYL CITRATE 50 UG/ML
25 INJECTION, SOLUTION INTRAMUSCULAR; INTRAVENOUS EVERY 5 MIN PRN
Status: DISCONTINUED | OUTPATIENT
Start: 2024-02-27 | End: 2024-02-28 | Stop reason: HOSPADM

## 2024-02-27 RX ORDER — SODIUM CHLORIDE, SODIUM LACTATE, POTASSIUM CHLORIDE, CALCIUM CHLORIDE 600; 310; 30; 20 MG/100ML; MG/100ML; MG/100ML; MG/100ML
INJECTION, SOLUTION INTRAVENOUS CONTINUOUS
Status: DISCONTINUED | OUTPATIENT
Start: 2024-02-27 | End: 2024-02-28 | Stop reason: HOSPADM

## 2024-02-27 RX ORDER — FENTANYL CITRATE 50 UG/ML
50 INJECTION, SOLUTION INTRAMUSCULAR; INTRAVENOUS EVERY 5 MIN PRN
Status: DISCONTINUED | OUTPATIENT
Start: 2024-02-27 | End: 2024-02-28 | Stop reason: HOSPADM

## 2024-02-27 RX ORDER — HYDROMORPHONE HYDROCHLORIDE 1 MG/ML
0.2 INJECTION, SOLUTION INTRAMUSCULAR; INTRAVENOUS; SUBCUTANEOUS EVERY 5 MIN PRN
Status: DISCONTINUED | OUTPATIENT
Start: 2024-02-27 | End: 2024-02-28 | Stop reason: HOSPADM

## 2024-02-27 RX ORDER — ONDANSETRON 2 MG/ML
4 INJECTION INTRAMUSCULAR; INTRAVENOUS
Status: DISCONTINUED | OUTPATIENT
Start: 2024-02-27 | End: 2024-02-28 | Stop reason: HOSPADM

## 2024-02-27 RX ORDER — NALOXONE HYDROCHLORIDE 0.4 MG/ML
0.1 INJECTION, SOLUTION INTRAMUSCULAR; INTRAVENOUS; SUBCUTANEOUS
Status: DISCONTINUED | OUTPATIENT
Start: 2024-02-27 | End: 2024-02-28 | Stop reason: HOSPADM

## 2024-02-27 RX ORDER — PROCHLORPERAZINE MALEATE 10 MG
10 TABLET ORAL EVERY 6 HOURS PRN
Status: CANCELLED | OUTPATIENT
Start: 2024-02-27

## 2024-02-27 RX ORDER — HYDROMORPHONE HYDROCHLORIDE 1 MG/ML
0.4 INJECTION, SOLUTION INTRAMUSCULAR; INTRAVENOUS; SUBCUTANEOUS EVERY 5 MIN PRN
Status: DISCONTINUED | OUTPATIENT
Start: 2024-02-27 | End: 2024-02-28 | Stop reason: HOSPADM

## 2024-02-27 RX ORDER — ONDANSETRON 4 MG/1
4 TABLET, ORALLY DISINTEGRATING ORAL EVERY 30 MIN PRN
Status: DISCONTINUED | OUTPATIENT
Start: 2024-02-27 | End: 2024-02-28 | Stop reason: HOSPADM

## 2024-02-27 RX ADMIN — SODIUM CHLORIDE, SODIUM LACTATE, POTASSIUM CHLORIDE, CALCIUM CHLORIDE: 600; 310; 30; 20 INJECTION, SOLUTION INTRAVENOUS at 11:23

## 2024-02-27 RX ADMIN — PROPOFOL 40 MG: 10 INJECTION, EMULSION INTRAVENOUS at 11:33

## 2024-02-27 RX ADMIN — PROPOFOL 60 MG: 10 INJECTION, EMULSION INTRAVENOUS at 11:32

## 2024-02-27 RX ADMIN — LIDOCAINE HYDROCHLORIDE 60 MG: 20 INJECTION, SOLUTION INFILTRATION; PERINEURAL at 11:26

## 2024-02-27 RX ADMIN — PROPOFOL 200 MCG/KG/MIN: 10 INJECTION, EMULSION INTRAVENOUS at 11:33

## 2024-02-27 ASSESSMENT — COPD QUESTIONNAIRES: COPD: 0

## 2024-02-27 ASSESSMENT — ENCOUNTER SYMPTOMS: ORTHOPNEA: 0

## 2024-02-27 ASSESSMENT — LIFESTYLE VARIABLES: TOBACCO_USE: 0

## 2024-02-27 NOTE — H&P
Abiola Matute  [id number]  female  58 year old      Reason for procedure/surgery: Crohn's disease    Patient Active Problem List   Diagnosis    Non morbid obesity due to excess calories    Other isolated or specific phobias    Other congenital malformations of mouth    Contact dermatitis and other eczema, due to unspecified cause    Intestinal infection due to Clostridium difficile    Iron deficiency anemia, unspecified iron deficiency anemia type    Rosacea    Atypical ductal hyperplasia of left breast    Idiopathic thrombocytopenia (H)    Postmenopausal- s/p hysterectomy with BSO - not on HRT secondary to atypical ductal hyperplasia & breast pain -no paps needed    Claustrophobia    S/P total hysterectomy and bilateral salpingo-oophorectomy    Abnormal liver enzymes- ? related to ongoing etoh use/abuse    Essential hypertension with goal blood pressure less than 140/90    Gastroenteritis    Stool incontinence    CARDIOVASCULAR SCREENING; LDL GOAL LESS THAN 130    AA (alcohol abuse) - ? ongoing     Alcoholic fatty liver    Acquired hyperbilirubinemia- ? secondary to alcohol use    Diffuse nodular cirrhosis of liver (H)    Severe Perianal Crohn's Disease    Biliary colic    Cholecystitis    Alcoholic cirrhosis of liver with ascites (H) - seeing [facility]     Abscess of anal or rectal region    Anal fistula    HCC (hepatocellular carcinoma) (H)    Alcohol use disorder, moderate, dependence (H)    Crohn's disease of large intestine with fistula (H)    Ascending aorta dilation (H24)    Hyponatremia       Past Surgical History:    Past Surgical History:   Procedure Laterality Date    BIOPSY ANAL N/A 3/28/2019    anal biopsy and culure placement of seton - Dr Fleming    BIOPSY BREAST Left 09/17/2010    - scheduled with Dr. Varma     BIOPSY LIVER  2019    COLONOSCOPY  2006    COLONOSCOPY N/A 12/23/2014    Procedure: COMBINED COLONOSCOPY, SINGLE OR MULTIPLE BIOPSY/POLYPECTOMY BY BIOPSY;  Surgeon: Diane Fleming,  MD;  Location:  GI    COLONOSCOPY N/A 12/5/2019    Procedure: COLONOSCOPY, WITH POLYPECTOMY AND BIOPSY;  Surgeon: Farhan Schilling MD;  Location: [facility]    ENDOSCOPIC RETROGRADE CHOLANGIOPANCREATOGRAM N/A 4/24/2020    Procedure: ENDOSCOPIC RETROGRADE CHOLANGIOPANCREATOGRAPHY WITH, sledge removal,sphincterotomy, stent in gallbladder and pancreatic duct stent, and balloon dilation;  Surgeon: Guru Isac Kraft MD;  Location: [facility] OR    ESOPHAGOSCOPY, GASTROSCOPY, DUODENOSCOPY (EGD), COMBINED N/A 12/5/2019    Procedure: ESOPHAGOGASTRODUODENOSCOPY (EGD);  Surgeon: Farhan Schilling MD;  Location: [facility]    ESOPHAGOSCOPY, GASTROSCOPY, DUODENOSCOPY (EGD), COMBINED N/A 5/11/2023    Procedure: Esophagoscopy, gastroscopy, duodenoscopy (EGD), combined;  Surgeon: Jeannette Cervantes MD;  Location: [facility]    EXAM UNDER ANESTHESIA ANUS N/A 3/28/2019    Procedure: EXAM UNDER ANESTHESIA ANUS;  Surgeon: Diane Fleming MD;  Location:  OR    EXAM UNDER ANESTHESIA ANUS N/A 2/26/2021    Procedure: EXAM UNDER ANESTHESIA OF ANUS, SETON PLACEMENT, EXCISION OF SKIN BRIDGE;  Surgeon: Avtar Nam MD;  Location: [facility] OR    EXAM UNDER ANESTHESIA ANUS N/A 1/6/2023    Procedure: EXAM UNDER ANESTHESIA, ANUS, SETON EXCHANGE, partial fistulotomy;  Surgeon: Mary Dugan MD;  Location: [facility] OR    HYSTERECTOMY, VAGINAL  2006    with Dr. Licha Zhou - with BSO for fibroids     IR GALLBLADDER DRAIN PLACEMENT  4/22/2020    IR SIRT (SELECTIVE INTERNAL RADIO THERAPY)  2/21/2023    IR VISCERAL ANGIOGRAM  2/21/2023    IR VISCERAL EMBOLIZATION  2/27/2023    LAPAROSCOPIC ABLATION LIVER TUMOR N/A 8/29/2023    Procedure: Diagnostic Laparoscopy, Laparoscopic microwave ablation of liver tumor intraoperative ultrasound of liver, lysis of adhesions 1 hour,;  Surgeon: Albino Saavedra MD;  Location: [facility] OR    OPEN REDUCTION INTERNAL FIXATION ANKLE Left at age 27    plates and screws removed at age 36     Pelviscopy with removal of bilateral hydrosalpinges.  04/15/2010    ZZC APPENDECTOMY  at age 14       Past Medical History:   Past Medical History:   Diagnosis Date    Alcohol abuse     Alcoholic cirrhosis of liver with ascites     Anal fistula     Atypical ductal hyperplasia of breast 09/10/2010    ERT not recommended -left - and flat epithelial atypia-scheduled for breast biopsy 9/17/2010     Bifid uvula     Cholelithiasis     Contact perianal dermatitis and other eczema     recurrent - clobetasol     Crohn disease     Fear of flying      - gets Ativan prn.     HCC (hepatocellular carcinoma) (H) 2/1/2023    Hypertension     IBS (irritable bowel syndrome)        Social History:   Social History     Tobacco Use    Smoking status: Never     Passive exposure: Never    Smokeless tobacco: Never   Substance Use Topics    Alcohol use: Not Currently       Family History:   Family History   Problem Relation Age of Onset    Breast Cancer Mother     Gastrointestinal Disease Mother     Heart Disease Father 57    Hypertension Maternal Grandmother     Substance Abuse Maternal Grandfather     Diabetes Maternal Grandfather     Diabetes Paternal Grandmother     Heart Disease Paternal Grandfather     Cancer Sister     Skin Cancer Sister     Substance Abuse Maternal Uncle     Substance Abuse Maternal Uncle     Suicide Niece     Suicide Niece     Anesthesia Reaction No family hx of     Thrombosis No family hx of        Allergies:   Allergies   Allergen Reactions    Fish Oil      Redness and itching around eye area only - went away when fish oil capsules stopped     Metronidazole      pain/itching    Ppd [Tuberculin Purified Protein Derivative]     Sulfa Antibiotics      hives    Terbinafine And Related      Lamisil = mild urticarial reaction       Active Medications:   Current Outpatient Medications   Medication Sig Dispense Refill    furosemide (LASIX) 40 MG tablet TAKE 1 TABLET BY MOUTH  DAILY (Patient taking differently: Take 40 mg  "by mouth every morning) 90 tablet 3    inFLIXimab (REMICADE) 100 MG injection Inject into the vein every 28 (twenty-eight) days Last dose 8/7/23      Multiple Vitamins-Minerals (MULTIVITAMIN & MINERAL PO) Take 1 tablet by mouth every morning      spironolactone (ALDACTONE) 100 MG tablet Take 1 tablet (100 mg) by mouth every morning 90 tablet 3    ammonium lactate (AMLACTIN) 12 % external cream Apply topically 2 times daily 385 g 3    clobetasol (TEMOVATE) 0.05 % external cream Apply to AA BID x 1-2 weeks then PRN. Do not apply to face. 60 g 3    tirzepatide (MOUNJARO) 2.5 MG/0.5ML pen Inject 2.5 mg Subcutaneous every 7 days for 28 days (Patient not taking: Reported on 2/21/2024) 2 mL 0    tirzepatide (MOUNJARO) 5 MG/0.5ML pen Inject 5 mg Subcutaneous every 7 days (Patient not taking: Reported on 2/21/2024) 2 mL 1    triamcinolone (KENALOG) 0.1 % external cream Apply to AA BID x 1-2 week then PRN only 80 g 3    UNABLE TO FIND Take 1 tablet by mouth every morning MEDICATION NAME: Turkey Tail         Systemic Review:   CONSTITUTIONAL: NEGATIVE for fever, chills, change in weight  ENT/MOUTH: NEGATIVE for ear, mouth and throat problems  RESP: NEGATIVE for significant cough or SOB  CV: NEGATIVE for chest pain, palpitations or peripheral edema    Physical Examination:   Vital Signs: /77 (BP Location: Right arm)   Pulse 69   Temp 97.3  F (36.3  C) (Temporal)   Resp 18   Ht 1.651 m (5' 5\")   Wt 80.7 kg (178 lb)   LMP 05/31/2006   SpO2 100%   BMI 29.62 kg/m    GENERAL: healthy, alert and no distress  NECK: no adenopathy, no asymmetry, masses, or scars  RESP: lungs clear to auscultation - no rales, rhonchi or wheezes  CV: regular rate and rhythm, normal S1 S2, no S3 or S4, no murmur, click or rub, no peripheral edema and peripheral pulses strong  ABDOMEN: soft, nontender, no hepatosplenomegaly, no masses and bowel sounds normal  MS: no gross musculoskeletal defects noted, no edema    Plan: Appropriate to proceed " as scheduled.      Michelle Diaz MD  2/27/2024    PCP:  Linda Macdonald

## 2024-02-27 NOTE — PROGRESS NOTES
Spoke with [place] and relayed MD message/recommendations for Remicade 10mg/kg every 8 weeks and Medicare guidelines for home infusion vs hospital center infusions.  Patient acknowledged understanding and agree with plan. Answer all questions offered contact information for pt to call with any questions or concerns.

## 2024-02-27 NOTE — ANESTHESIA PREPROCEDURE EVALUATION
Anesthesia Pre-Procedure Evaluation    Patient: Abiola Matute   MRN: [id number] : 1965        Procedure : Procedure(s):  Colonoscopy          Past Medical History:   Diagnosis Date     Alcohol abuse      Alcoholic cirrhosis of liver with ascites      Anal fistula      Atypical ductal hyperplasia of breast 09/10/2010    ERT not recommended -left - and flat epithelial atypia-scheduled for breast biopsy 2010      Bifid uvula      Cholelithiasis      Contact perianal dermatitis and other eczema     recurrent - clobetasol      Crohn disease      Fear of flying      - gets Ativan prn.      HCC (hepatocellular carcinoma) (H) 2023     Hypertension      IBS (irritable bowel syndrome)       Past Surgical History:   Procedure Laterality Date     BIOPSY ANAL N/A 3/28/2019    anal biopsy and culure placement of seton - Dr Fleming     BIOPSY BREAST Left 2010    - scheduled with Dr. Varma      BIOPSY LIVER  2019     COLONOSCOPY  2006     COLONOSCOPY N/A 2014    Procedure: COMBINED COLONOSCOPY, SINGLE OR MULTIPLE BIOPSY/POLYPECTOMY BY BIOPSY;  Surgeon: Diane Fleming MD;  Location:  GI     COLONOSCOPY N/A 2019    Procedure: COLONOSCOPY, WITH POLYPECTOMY AND BIOPSY;  Surgeon: Farhan Schilling MD;  Location:  GI     ENDOSCOPIC RETROGRADE CHOLANGIOPANCREATOGRAM N/A 2020    Procedure: ENDOSCOPIC RETROGRADE CHOLANGIOPANCREATOGRAPHY WITH, sledge removal,sphincterotomy, stent in gallbladder and pancreatic duct stent, and balloon dilation;  Surgeon: Guru Isac Kraft MD;  Location:  OR     ESOPHAGOSCOPY, GASTROSCOPY, DUODENOSCOPY (EGD), COMBINED N/A 2019    Procedure: ESOPHAGOGASTRODUODENOSCOPY (EGD);  Surgeon: Farhan Schilling MD;  Location:  GI     ESOPHAGOSCOPY, GASTROSCOPY, DUODENOSCOPY (EGD), COMBINED N/A 2023    Procedure: Esophagoscopy, gastroscopy, duodenoscopy (EGD), combined;  Surgeon: Jeannette Cervantes MD;  Location:   GI     EXAM UNDER ANESTHESIA ANUS N/A 3/28/2019    Procedure: EXAM UNDER ANESTHESIA ANUS;  Surgeon: Diane Fleming MD;  Location:  OR     EXAM UNDER ANESTHESIA ANUS N/A 2/26/2021    Procedure: EXAM UNDER ANESTHESIA OF ANUS, SETON PLACEMENT, EXCISION OF SKIN BRIDGE;  Surgeon: Avtar Nam MD;  Location: [facility] OR     EXAM UNDER ANESTHESIA ANUS N/A 1/6/2023    Procedure: EXAM UNDER ANESTHESIA, ANUS, SETON EXCHANGE, partial fistulotomy;  Surgeon: Mary Dugan MD;  Location: [facility] OR     HYSTERECTOMY, VAGINAL  2006    with Dr. Licha Zhou - with BSO for fibroids      IR GALLBLADDER DRAIN PLACEMENT  4/22/2020     IR SIRT (SELECTIVE INTERNAL RADIO THERAPY)  2/21/2023     IR VISCERAL ANGIOGRAM  2/21/2023     IR VISCERAL EMBOLIZATION  2/27/2023     LAPAROSCOPIC ABLATION LIVER TUMOR N/A 8/29/2023    Procedure: Diagnostic Laparoscopy, Laparoscopic microwave ablation of liver tumor intraoperative ultrasound of liver, lysis of adhesions 1 hour,;  Surgeon: Albino Saavedra MD;  Location: [facility] OR     OPEN REDUCTION INTERNAL FIXATION ANKLE Left at age 27    plates and screws removed at age 36     Pelviscopy with removal of bilateral hydrosalpinges.  04/15/2010     ZZC APPENDECTOMY  at age 14      Allergies   Allergen Reactions     Fish Oil      Redness and itching around eye area only - went away when fish oil capsules stopped      Metronidazole      pain/itching     Ppd [Tuberculin Purified Protein Derivative]      Sulfa Antibiotics      hives     Terbinafine And Related      Lamisil = mild urticarial reaction      Social History     Tobacco Use     Smoking status: Never     Passive exposure: Never     Smokeless tobacco: Never   Substance Use Topics     Alcohol use: Not Currently      Wt Readings from Last 1 Encounters:   02/21/24 81.7 kg (180 lb 3.2 oz)        Anesthesia Evaluation   Pt has had prior anesthetic.     No history of anesthetic complications       ROS/MED HX  ENT/Pulmonary:     (+)     ERIC risk  factors,  hypertension,                              (-) tobacco use, asthma, COPD and recent URI   Neurologic:  - neg neurologic ROS     Cardiovascular:     (+)  hypertension- -   -  - -                                 Previous cardiac testing   Echo: Date: Results:    Stress Test:  Date: Results:    ECG Reviewed:  Date: 2019 Results:  See H&P  Cath:  Date: Results:   (-) [place] and orthopnea/PND   METS/Exercise Tolerance: >4 METS Comment: Walks on her treadmill a couple times per week (1-3mph) for 15-20 minutes   Hematologic: Comments: Thrombocytopenia 134      (-) history of blood clots and history of blood transfusion   Musculoskeletal: Comment: Chronic generalized joint pain      GI/Hepatic: Comment: Crohn's has been fairly well controlled with infliximab infusions.    + anal fistula    (+)       Inflammatory bowel disease, bowel prep,  cholecystitis/cholelithiasis,   liver disease,       Renal/Genitourinary:  - neg Renal ROS     Endo:     (+)               Obesity,       Psychiatric/Substance Use:     (+) psychiatric history anxiety (periodic situational anxiety) alcohol abuse      Infectious Disease:  - neg infectious disease ROS  (-) Recent Fever   Malignancy:  - neg malignancy ROS     Other: Comment: Eczema on heels     (+)  , H/O Chronic Pain,            OUTSIDE LABS:  CBC:   Lab Results   Component Value Date    WBC 4.4 02/21/2024    WBC 3.9 (L) 02/07/2024    HGB 14.1 02/21/2024    HGB 14.5 02/07/2024    HCT 41.8 02/21/2024    HCT 42.2 02/07/2024     (L) 02/21/2024     (L) 02/07/2024     BMP:   Lab Results   Component Value Date     02/07/2024     (L) 09/19/2023    POTASSIUM 4.6 02/07/2024    POTASSIUM 3.8 09/19/2023    CHLORIDE 103 02/07/2024    CHLORIDE 100 09/19/2023    CO2 24 02/07/2024    CO2 23 09/19/2023    BUN 12.3 02/07/2024    BUN 7.3 (L) 09/19/2023    CR 0.69 02/07/2024    CR 0.74 09/19/2023    GLC 96 02/07/2024     (H) 09/19/2023     COAGS:   Lab Results  "  Component Value Date    PTT 41 (H) 04/21/2020    INR 1.11 02/07/2024     POC:   Lab Results   Component Value Date     (H) 04/24/2020    HCG Negative 06/07/2006    HCGS Negative 02/14/2023     HEPATIC:   Lab Results   Component Value Date    ALBUMIN 3.8 02/21/2024    PROTTOTAL 8.4 (H) 02/21/2024    ALT 48 02/21/2024    AST 89 (H) 02/21/2024     (H) 11/02/2017    ALKPHOS 141 02/21/2024    BILITOTAL 2.2 (H) 02/21/2024     OTHER:   Lab Results   Component Value Date    A1C 4.7 02/14/2023    ARIEL 9.4 02/07/2024    PHOS 4.3 02/14/2023    MAG 2.0 05/21/2014    LIPASE 205 04/25/2020    AMYLASE 79 04/24/2020    TSH 2.63 02/14/2023    CRP 5.0 09/15/2021    SED 48 (H) 02/21/2024       Anesthesia Plan    ASA Status:  3       Anesthesia Type: MAC.     - Reason for MAC: immobility needed              Consents            Postoperative Care            Comments:             Darcie Morrow MD    I have reviewed the pertinent notes and labs in the chart from the past 30 days and (re)examined the patient.  Any updates or changes from those notes are reflected in this note.             # Thrombocytopenia: Lowest platelets = 114 in last 30 days, will monitor for bleeding  # Overweight: Estimated body mass index is 29.99 kg/m  as calculated from the following:    Height as of 2/13/24: 1.651 m (5' 5\").    Weight as of 2/21/24: 81.7 kg (180 lb 3.2 oz).      "

## 2024-02-27 NOTE — ANESTHESIA POSTPROCEDURE EVALUATION
Patient: Abiola Matute    Procedure: Procedure(s):  COLONOSCOPY, WITH POLYPECTOMY AND BIOPSY       Anesthesia Type:  MAC    Note:  Disposition: Outpatient   Postop Pain Control: Uneventful            Sign Out: Well controlled pain   PONV: No   Neuro/Psych: Uneventful            Sign Out: Acceptable/Baseline neuro status   Airway/Respiratory: Uneventful            Sign Out: Acceptable/Baseline resp. status   CV/Hemodynamics: Uneventful            Sign Out: Acceptable CV status; No obvious hypovolemia; No obvious fluid overload   Other NRE: NONE   DID A NON-ROUTINE EVENT OCCUR? No       Last vitals:  Vitals Value Taken Time   /72 02/27/24 1217   Temp 36.7  C (98  F) 02/27/24 1217   Pulse 68 02/27/24 1217   Resp 16 02/27/24 1217   SpO2 100 % 02/27/24 1217       Electronically Signed By: Darcie Morrow MD  February 27, 2024  12:18 PM

## 2024-02-27 NOTE — PROGRESS NOTES
Dr. Diaz recommend verifying Medicare coverage for Remcade 5mg/kg every 4 weeks or Remicade 10mg/kg every 8 weeks. Also verify home infusion vs Hospital infusion.

## 2024-02-27 NOTE — PROGRESS NOTES
Per discussion with our Infusion PA Team Medicare only cover FDA approved medication dose regimen. Medicare does not have an appeals process.    Updated therapy plan for Remicade 10mg/kg every 8 weeks.    Discuss above with Dr. Diaz and recommend the 10mg/kg every 8 weeks.

## 2024-02-27 NOTE — ANESTHESIA CARE TRANSFER NOTE
Patient: Abiola Matute    Procedure: Procedure(s):  Colonoscopy       Diagnosis: Crohn's disease of large intestine with fistula (H) [K50.113]  Diagnosis Additional Information: No value filed.    Anesthesia Type:   MAC     Note:    Oropharynx: oropharynx clear of all foreign objects and spontaneously breathing  Level of Consciousness: awake  Oxygen Supplementation: room air    Independent Airway: airway patency not satisfactory and stable  Dentition: dentition unchanged  Vital Signs Stable: post-procedure vital signs reviewed and stable  Report to RN Given: handoff report given  Patient transferred to: Phase II    Handoff Report: Identifed the Patient, Identified the Reponsible Provider, Reviewed the pertinent medical history, Discussed the surgical course, Reviewed Intra-OP anesthesia mangement and issues during anesthesia, Set expectations for post-procedure period and Allowed opportunity for questions and acknowledgement of understanding    SEE POST OP VITALS  Vitals:  Vitals Value Taken Time   BP     Temp     Pulse 79 02/27/24 1145   Resp     SpO2         Electronically Signed By: DARYN Gonzalez CRNA  February 27, 2024  11:51 AM

## 2024-02-28 ENCOUNTER — LAB (OUTPATIENT)
Dept: LAB | Facility: CLINIC | Age: 59
End: 2024-02-28
Payer: MEDICARE

## 2024-02-28 ENCOUNTER — MYC MEDICAL ADVICE (OUTPATIENT)
Dept: GASTROENTEROLOGY | Facility: CLINIC | Age: 59
End: 2024-02-28
Payer: MEDICARE

## 2024-02-28 DIAGNOSIS — C22.0 HCC (HEPATOCELLULAR CARCINOMA) (H): ICD-10-CM

## 2024-02-28 DIAGNOSIS — K70.31 ALCOHOLIC CIRRHOSIS OF LIVER WITH ASCITES (H): ICD-10-CM

## 2024-02-28 LAB
ALBUMIN SERPL BCG-MCNC: 3.6 G/DL (ref 3.5–5.2)
ALP SERPL-CCNC: 152 U/L (ref 40–150)
ALT SERPL W P-5'-P-CCNC: 42 U/L (ref 0–50)
ANION GAP SERPL CALCULATED.3IONS-SCNC: 8 MMOL/L (ref 7–15)
AST SERPL W P-5'-P-CCNC: 78 U/L (ref 0–45)
BILIRUB DIRECT SERPL-MCNC: 0.59 MG/DL (ref 0–0.3)
BILIRUB SERPL-MCNC: 1.9 MG/DL
BUN SERPL-MCNC: 8.8 MG/DL (ref 8–23)
CALCIUM SERPL-MCNC: 8.6 MG/DL (ref 8.6–10)
CHLORIDE SERPL-SCNC: 101 MMOL/L (ref 98–107)
CREAT SERPL-MCNC: 0.73 MG/DL (ref 0.51–0.95)
DEPRECATED HCO3 PLAS-SCNC: 26 MMOL/L (ref 22–29)
EGFRCR SERPLBLD CKD-EPI 2021: >90 ML/MIN/1.73M2
GLUCOSE SERPL-MCNC: 91 MG/DL (ref 70–99)
INR PPP: 1.21 (ref 0.85–1.15)
PATH REPORT.COMMENTS IMP SPEC: NORMAL
PATH REPORT.COMMENTS IMP SPEC: NORMAL
PATH REPORT.FINAL DX SPEC: NORMAL
PATH REPORT.GROSS SPEC: NORMAL
PATH REPORT.MICROSCOPIC SPEC OTHER STN: NORMAL
PATH REPORT.RELEVANT HX SPEC: NORMAL
PHOTO IMAGE: NORMAL
POTASSIUM SERPL-SCNC: 4.2 MMOL/L (ref 3.4–5.3)
PROT SERPL-MCNC: 7.6 G/DL (ref 6.4–8.3)
SODIUM SERPL-SCNC: 135 MMOL/L (ref 135–145)

## 2024-02-28 PROCEDURE — 80321 ALCOHOLS BIOMARKERS 1OR 2: CPT | Mod: GA

## 2024-02-28 PROCEDURE — 82105 ALPHA-FETOPROTEIN SERUM: CPT

## 2024-02-28 PROCEDURE — 82248 BILIRUBIN DIRECT: CPT

## 2024-02-28 PROCEDURE — 85610 PROTHROMBIN TIME: CPT

## 2024-02-28 PROCEDURE — 82374 ASSAY BLOOD CARBON DIOXIDE: CPT

## 2024-02-28 PROCEDURE — 36415 COLL VENOUS BLD VENIPUNCTURE: CPT

## 2024-02-28 NOTE — PROGRESS NOTES
Received notification our Infusion PA Team was able to secure Remicade 10mg/kg every 8 weeks.    Sent pt MyC message with above information.

## 2024-02-29 LAB — AFP SERPL-MCNC: 10.8 NG/ML

## 2024-03-01 ENCOUNTER — DOCUMENTATION ONLY (OUTPATIENT)
Dept: TRANSPLANT | Facility: CLINIC | Age: 59
End: 2024-03-01
Payer: MEDICARE

## 2024-03-01 NOTE — LETTER
March 1, 2024    Abiola Matute  [address] [address]    Dear Ms. Matute,    This letter is sent to confirm that you have completed your transplant work-up and you are a candidate in the liver transplant program at the [facility].  You were placed on the liver active waitlist on 3/1/2024.      Now that the evaluation process is complete, the general hepatology team will take over the management of your cares. This team also works with your hepatologist and will be your main contact for symptom management, appointments, medications, and any questions that are not specific to transplant. You can reach this team by calling [phone] or pluriSelect message your hepatology provider. The transplant team will continue to monitor MELD labs and notify you when MELD labs are due.     When you are active on the waitlist and an organ becomes available, a coordinator will need to speak to you immediately.  You could be contacted at any time during the day or night as an organ could become available at any time.  Please make certain our office always has your current telephone numbers and address.      Items we will need from you:    We have received approval from your insurance company for the transplant procedure.  It is critical that you notify us if there is any change in your insurance.  It is also important that you familiarize yourself with the details of your specific insurance policy.  Our patient  is available to assist you if you should have any questions regarding your coverage.    You will need to have labs drawn frequently while you are listed. The frequency is based on your MELD score. Your current listing MELD is 13. Below is a chart to help you understand how often to have labs drawn.  If you are uncertain of your MELD score/how often you need labs, please contact your Transplant Coordinator.  Additionally, if you  would like to have labs drawn outside the [facility] system, please notify me to make arrangements to have labs ordered.  It will be your responsibility to remind your physician to forward your results to the Transplant Office.                MELD 25 and greater - Weekly labs              MELD 18-24 - Monthly labs        MELD 17 or less- every 3 months    During this waiting period, we may request additional periodic laboratory tests with your primary physician.  It will be your responsibility to remind your physician to forward your results to the Transplant Office.    We need to be kept informed of any changes in your medical condition such as:  changes in your medications,   significant changes in your health  significant infections (such as pneumonia or abscesses)  blood transfusions  any condition which requires hospitalization  any surgery    Remember to complete any routine cancer screening tests required before your transplant.  This includes colonoscopy; prostate screening for men, and mammogram and gynecologic testing for women, as well as dental work.  Your primary care clinic can assist you with arranging for these exams.  Remind your caregivers to forward copies of the records and final reports.      We want you to know that our program has physician and surgeon coverage 24 hours a day, 365 days a year. In addition, our transplant surgeons and physicians will not be on call for two or more transplant programs more than 30 miles apart unless the circumstances have been reviewed and approved by the [employer] ([employer]) Membership and Professional Standards Committee (MPSC). Finally, our primary physician and primary surgeons are not designated as the primary surgeon or primary physician at more than 1 transplant hospital. If this coverage changes or there are substantial program changes, you will be notified in writing by letter.     Attached is a letter from UN[employer] that describes  the services and information offered to patients by UNOS and the Organ Procurement and Transplantation Network (OPTN).    We appreciate having had the opportunity to participate in your care.  If you have questions, please feel free to call the Transplant Office at [phone] or [phone].      Sincerely,  Fabio Antunez RN  Pre-Liver Transplant Coordinator  [phone] (direct)  [phone] (fax)    Solid Organ Transplant  [facility]      Enclosures: UNOS Letter  cc: Care Team    The Organ Procurement and Transplantation Network Toll-free patient services line:  [phone]  Your resource for organ transplant information    Staffed 8:30 am - 5:00 pm ET Monday - Friday Leave a message 24/7 to receive a call back    The Organ Procurement and Transplantation Network (OPTN) is the national transplant system. It makes the policies that decide how donated organs are matched to patients waiting for a transplant. The OPTN:    Makes sure donated organs get matched to people on the transplant waiting list  Tells people about the donation and transplant processes  Makes sure that the public knows about the need for more organ and tissue donations    The OPTN has a free patient services line that you can call to:  Get more information about:  Organ donation and organ transplants  Donation and transplant policies  Get an information kit with:  A list of transplant hospitals  Waiting list information  Talk about any questions you may have about your transplant hospital or organ procurement organization. The staff will do their best to help you or point you to others who may help.  Find out how you can volunteer with the OPTN and help shape transplant policy     The patient services line number is: 0-118-974-8684    Patient services line staff CANNOT answer questions about your own medical care,  including:  Waiting list status  Test results  Medical records  You will need to call your transplant hospital for this information.    The following websites have more information about transplantation and donation:    OPTN: https://optn.transplant.hrsa.gov/    For potential living donors and transplant recipients:    Living with transplant: https://www.transplantliving.org/    Living donation process: https://optn.transplant.hrsa.gov/living-donation/    Financial assistance: https://www.livingdonorassistance.org/    Transplantation data: https://www.srtr.org/    Organ donation: https://www.organdonor.gov/    Volunteer with the OPTN: https://optn.transplant.hrsa.gov/get-involved/

## 2024-03-01 NOTE — PROGRESS NOTES
NEW LIVER LISTING    Listing MELD: 13 (applying for HCC exception points)  ABO: O  Diagnosis: HCC    Patient is Open to All Extended Criteria. Patient is Eligible for Living Donor.

## 2024-03-02 LAB
LABORATORY REPORT: NORMAL
PETH INTERPRETATION: NORMAL
PLPETH BLD-MCNC: <10 NG/ML
POPETH BLD-MCNC: <10 NG/ML

## 2024-03-04 ENCOUNTER — MYC MEDICAL ADVICE (OUTPATIENT)
Dept: TRANSPLANT | Facility: CLINIC | Age: 59
End: 2024-03-04
Payer: MEDICARE

## 2024-03-05 ENCOUNTER — TELEPHONE (OUTPATIENT)
Dept: GASTROENTEROLOGY | Facility: CLINIC | Age: 59
End: 2024-03-05
Payer: MEDICARE

## 2024-03-06 ENCOUNTER — TELEPHONE (OUTPATIENT)
Dept: GASTROENTEROLOGY | Facility: CLINIC | Age: 59
End: 2024-03-06
Payer: MEDICARE

## 2024-03-06 NOTE — TELEPHONE ENCOUNTER
Left Voicemail (1st Attempt) and Sent Mychart (1st Attempt) for the patient to call back and schedule the following:    Appointment type: Return IBD  Provider: Dr Schilling  Return date: 09/05/24  Specialty phone number: [phone]  Additional appointment(s) needed: N/A  Additonal Notes: Scheduling per Dr Fuller follow up order

## 2024-03-08 ENCOUNTER — TELEPHONE (OUTPATIENT)
Dept: GASTROENTEROLOGY | Facility: CLINIC | Age: 59
End: 2024-03-08
Payer: MEDICARE

## 2024-03-08 NOTE — TELEPHONE ENCOUNTER
Left Voicemail (2nd Attempt) and Sent Mychart (2nd Attempt) for the patient to call back and schedule the following:    Appointment type: Return IBD  Provider: Dr Schilling  Return date: 09/05/24  Specialty phone number: [phone]  Additional appointment(s) needed: N/A  Additonal Notes: Scheduling w Dr Schilling per Dr Fuller follow up order

## 2024-03-23 ENCOUNTER — HEALTH MAINTENANCE LETTER (OUTPATIENT)
Age: 59
End: 2024-03-23

## 2024-04-10 ENCOUNTER — VIRTUAL VISIT (OUTPATIENT)
Dept: GASTROENTEROLOGY | Facility: CLINIC | Age: 59
End: 2024-04-10
Attending: PHYSICIAN ASSISTANT
Payer: MEDICARE

## 2024-04-10 DIAGNOSIS — K50.113 CROHN'S DISEASE OF COLON WITH FISTULA (H): Primary | ICD-10-CM

## 2024-04-10 NOTE — Clinical Note
4/10/2024         RE: Abiola Matute  [address] [address]        Dear Colleague,    Thank you for referring your patient, Abiola Matute, to the [facility]. Please see a copy of my visit note below.    Medication Therapy Management (MTM) Encounter    ASSESSMENT:                            Medication Adherence/Access: {adherencechoices:995623}    ***:  ***      PLAN:                            [place] to talk with Dr. Zayas about repeating the hepatitis B series.  Will add quantiferon (tuberculosis) screening for 2024 to your infusion.    Follow-up:     -- 6 months for check-in with MTM, sooner if needed    SUBJECTIVE/OBJECTIVE:                          [place] Matute is a 58 year old female called for an initial visit. She was referred to me from Sheldon Serrato PA-C.     Reason for visit: infliximab // IBD health maintenance review    Allergies/ADRs: Reviewed in chart  Tobacco: She reports that she has never smoked. She has never been exposed to tobacco smoke. She has never used smokeless tobacco.  Alcohol: not currently using    Medication Adherence/Access: {[facility]:066953}  -- holding off on Mounjaro at this time, would like to keep it active      ***:   Infliximab 10 mg/kg every 8 weeks     Notes that she wasn't allowed to keep her infusions at home or    IBD Health Maintenance    Vaccinations:  All patients on biologics should avoid live vaccines unless specifically indicated.    -- Influenza (every year) 2/7/2024  -- TdaP (every 10 years) last 9/4/2020  -- Pneumococcal Pneumonia    Prevnar-13:   Pneumovax-23:   Prevnar-20:  -- COVID-19  -- RSV    One time confirmation of immunity or serologies:  -- Hepatitis A (serologies or immunizations)  -- Hepatitis B (serologies or immunizations) serologies do not indicate immunity  -- Varicella/Zoster    Varicella   Zoster    Due to the immunosuppression in this patient, I would not advise administration of  live vaccines such as varicella/VZV, intranasal influenza, MMR, or yellow fever vaccine (if traveling).      Immunosuppressive Screening:    Lab Results   Component Value Date    AUSAB 1.41 02/14/2023    HEPBANG Nonreactive 02/14/2023    HBCAB Nonreactive 02/14/2023    HCVAB Nonreactive 02/14/2023    TBRES Negative 02/14/2023       Bone mineral density screening   -- Recommend all patients supplement with calcium and vitamin D  -- ***Given prior steroid use recommend DEXA if not already done    Cancer Screening:  Colon cancer screening:  Given ***, recommend patient undergo regular dysplasia surveillance   Next dysplasia screening is recommended ***.    Cervical cancer screening:   -- s/p hysterectomy and bilateral salpingo-oophorectomy    Skin cancer screening: Annual visual exam of skin by dermatologist since patient is immunocompromised   -- gets at least annually    Depression Screening:    PHQ-2 Score:         2/6/2024     9:43 PM 11/15/2023    11:00 AM   PHQ-2 ( 1999 Pf[employer])   Q1: Little interest or pleasure in doing things 0 0   Q2: Feeling down, depressed or hopeless 0 0   PHQ-2 Score 0 0   Q1: Little interest or pleasure in doing things Not at all    Q2: Feeling down, depressed or hopeless Not at all    PHQ-2 Score 0    -- meet with a small group // did individual and group therapy, notes that she has continued to meet with small group, has great support with kiddos and      Research:  Are you interested in being contacted about enrollment in clinical research studies? Yes    Misc:  -- Avoid tobacco use  -- Avoid NSAIDs as there is potentially a 25% chance of causing an IBD flare    Today's Vitals: LMP 05/31/2006   ----------------  {MANUEL?:250204}    I spent 24 minutes with this patient today. All changes were made via collaborative practice agreement with Sheldon Serrato. A copy of the visit note was provided to the patient's provider(s).    A summary of these recommendations was sent via  [employer].    Sonia Pacheco PharmD, BCACP  MTM Pharmacist   [facility]   Phone: [phone]    Telemedicine Visit Details  Type of service:  Telephone visit  Start Time:  10:02 AM  End Time: 10:27 AM     Medication Therapy Recommendations  No medication therapy recommendations to display       Medication Therapy Management (MTM) Encounter    ASSESSMENT:                            Medication Adherence/Access: See below for considerations    Crohn's Disease: [place] would benefit from continuing on infliximab at this time for maintenance of remission. We discussed that given recent dose change, we can see how she does at this interval. Another consideration would be to potentially switch her to the subcutaneous product if we could get this covered, since it may provide more consistent coverage. Reviewed health maintenance as noted below. Discussed consideration for hepatitis vaccination, which she will discuss with the hepatology team when she sees them later this month. She is also due for routine tuberculosis screening, which we can plan to pair with her infusion when she is due for labs next.    PLAN:                            [place] to talk with Dr. Zayas about the hepatitis B vaccine series or hepatitis A/B vaccine series. You can also consider the COVID-19 updated booster.  Will add quantiferon (tuberculosis) screening for 2024 to your infusion.    Follow-up:     -- 6 months for check-in with MTM, sooner if needed    SUBJECTIVE/OBJECTIVE:                          [place] Matute is a 58 year old female called for an initial visit. She was referred to me from hSeldon Serrato PA-C.     Reason for visit: IBD health maintenance review    Allergies/ADRs: Reviewed in chart  Tobacco: She reports that she has never smoked. She has never been exposed to tobacco smoke. She has never used smokeless tobacco.  Alcohol: not currently using (listed for transplant)    Medication Adherence/Access:   -- holding  off on Mounjaro at this time, would like to keep it active on medication list    Crohn's Disease:   Infliximab 10 mg/kg every 8 weeks     Notes that she wasn't allowed to keep her infusions at home or at the every four week frequency once she went on Medicare. She was changed to a higher dose at the standard frequency. She has hepatocellular carcinoma and is followed by hepatology (Dr. Cervantes). She has an appointment scheduled with them in a couple of weeks for follow-up. She is currently listed for liver transplant.    Last provider visit: 2/13/2024 with Cecil Fuller/Joe  Next provider visit: not on file   Last Quantiferon: 2/2023    Colonoscopy 2/2024:  Impression:            - Simple Endoscopic Score for Crohn's Disease: 0,                          mucosal inflammatory changes secondary to Crohn's                          disease, in remission. Biopsied.     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 [facility] placements    IBD Health Maintenance    Vaccinations:  All patients on biologics should avoid live vaccines unless specifically indicated.    -- Influenza (every year) 2/7/2024  -- TdaP (every 10 years) last 9/4/2020  -- Pneumococcal Pneumonia    Prevnar-13: not on file    Pneumovax-23: 9/4/2020   Prevnar-20: 11/11/2022  -- COVID-19 4/5/2021, 4/21/2021, and 12/22/2021    One time confirmation of immunity or serologies:  -- Hepatitis A (serologies or immunizations) not on file   -- Hepatitis B (serologies or immunizations) serologies do not indicate immunity  -- Varicella/Zoster    Varicella presumed based on age   Zoster 9/8/2020 and 11/4/2020    Due to the immunosuppression in this patient, I would not advise administration of live vaccines such as varicella/VZV, intranasal influenza, MMR, or yellow fever vaccine (if traveling).      Immunosuppressive Screening:    Lab Results    Component Value Date    AUSAB 1.41 02/14/2023    HEPBANG Nonreactive 02/14/2023    HBCAB Nonreactive 02/14/2023    HCVAB Nonreactive 02/14/2023    TBRES Negative 02/14/2023     Bone mineral density screening   -- Recommend all patients supplement with calcium and vitamin D  -- DEXA 3/2023 consistent with normal bone density    Cancer Screening:  Colonoscopy: provider notes indicate repeat colonoscopy in 2 years for surveillance (2026)    Cervical cancer screening:   -- s/p hysterectomy and bilateral salpingo-oophorectomy    Skin cancer screening: Annual visual exam of skin by dermatologist since patient is immunocompromised   -- gets at least annually    Depression Screening:    PHQ-2 Score:         2/6/2024     9:43 PM 11/15/2023    11:00 AM   PHQ-2 ( 1999 Pfizer)   Q1: Little interest or pleasure in doing things 0 0   Q2: Feeling down, depressed or hopeless 0 0   PHQ-2 Score 0 0   Q1: Little interest or pleasure in doing things Not at all    Q2: Feeling down, depressed or hopeless Not at all    PHQ-2 Score 0    -- meet with a small group // did individual and group therapy, notes that she has continued to meet with small group, has great support with kiddos and      Research:  Are you interested in being contacted about enrollment in clinical research studies? Yes    Misc:  -- Avoid tobacco use  -- Avoid NSAIDs as there is potentially a 25% chance of causing an IBD flare    Today's Vitals: LMP 05/31/2006   ----------------    I spent 24 minutes with this patient today. All changes were made via collaborative practice agreement with Sheldon Serrato. A copy of the visit note was provided to the patient's provider(s).    A summary of these recommendations was sent via [employer].    Sonia TidwellD, BCACP  MTM Pharmacist   [facility]   Phone: [phone]    Telemedicine Visit Details  Type of service:  Telephone visit  Start Time:  10:02 AM  End Time: 10:27 AM     Medication  Therapy Recommendations  No medication therapy recommendations to display         Again, thank you for allowing me to participate in the care of your patient.        Sincerely,        Sonia Pacheco RPH

## 2024-04-10 NOTE — PROGRESS NOTES
Medication Therapy Management (MTM) Encounter    ASSESSMENT:                            Medication Adherence/Access: See below for considerations    Crohn's Disease: Virginia would benefit from continuing on infliximab at this time for maintenance of remission. We discussed that given recent dose change, we can see how she does at this interval. Another consideration would be to potentially switch her to the subcutaneous product if we could get this covered, since it may provide more consistent coverage. Reviewed health maintenance as noted below. Discussed consideration for hepatitis vaccination, which she will discuss with the hepatology team when she sees them later this month. She is a candidate for the COVID-19 23/24 booster based on general guidelines. She is also due for routine tuberculosis screening, which we can plan to pair with her infusion when she is due for labs next.    PLAN:                            Virginia to talk with Dr. Zayas about the hepatitis B vaccine series or hepatitis A/B vaccine series. You can also consider the COVID-19 updated booster.    Will add quantiferon (tuberculosis) screening for 2024 to your infusion.    Follow-up:     -- 6 months for check-in with MTM, sooner if needed    SUBJECTIVE/OBJECTIVE:                          [place]ia Matute is a 58 year old female called for an initial visit. She was referred to me from Sheldon Serrato PA-C/Dr. Diaz.     Reason for visit: IBD health maintenance review    Allergies/ADRs: Reviewed in chart  Tobacco: She reports that she has never smoked. She has never been exposed to tobacco smoke. She has never used smokeless tobacco.  Alcohol: not currently using (listed for transplant)    Medication Adherence/Access:   -- holding off on Mounjaro at this time, would like to keep it active on medication list    Crohn's Disease:   Infliximab 10 mg/kg every 8 weeks     Notes that she wasn't allowed to keep her infusions at home or at the every four week  frequency once she went on Medicare. She was changed to a higher dose at the standard frequency. She has hepatocellular carcinoma and is followed by hepatology (Dr. Cervantes). She has an appointment scheduled with them in a couple of weeks for follow-up. She is currently listed for liver transplant.    Last provider visit: 2/13/2024 with Cecil Fuller/Joe  Next provider visit: not on file   Last Quantiferon: 2/2023    Colonoscopy 2/2024:  Impression:            - Simple Endoscopic Score for Crohn's Disease: 0,                          mucosal inflammatory changes secondary to Crohn's                          disease, in remission. Biopsied.     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 [facility] placements    IBD Health Maintenance    Vaccinations:  All patients on biologics should avoid live vaccines unless specifically indicated.    -- Influenza (every year) 2/7/2024  -- TdaP (every 10 years) last 9/4/2020  -- Pneumococcal Pneumonia    Prevnar-13: not on file    Pneumovax-23: 9/4/2020   Prevnar-20: 11/11/2022  -- COVID-19 4/5/2021, 4/21/2021, and 12/22/2021    One time confirmation of immunity or serologies:  -- Hepatitis A (serologies or immunizations) not on file   -- Hepatitis B (serologies or immunizations) serologies do not indicate immunity 2023  -- Varicella/Zoster    Varicella presumed based on age   Zoster 9/8/2020 and 11/4/2020    Due to the immunosuppression in this patient, I would not advise administration of live vaccines such as varicella/VZV, intranasal influenza, MMR, or yellow fever vaccine (if traveling).      Immunosuppressive Screening:    Lab Results   Component Value Date    AUSAB 1.41 02/14/2023    HEPBANG Nonreactive 02/14/2023    HBCAB Nonreactive 02/14/2023    HCVAB Nonreactive 02/14/2023    TBRES Negative 02/14/2023     Bone mineral density screening   -- Recommend  all patients supplement with calcium and vitamin D  -- DEXA 3/2023 consistent with normal bone density    Cancer Screening:  Colonoscopy: provider notes indicate repeat colonoscopy in 2 years for surveillance (2026)    Cervical cancer screening:   -- s/p hysterectomy and bilateral salpingo-oophorectomy    Skin cancer screening: Annual visual exam of skin by dermatologist since patient is immunocompromised   -- gets at least annually    Depression Screening:    PHQ-2 Score:         2/6/2024     9:43 PM 11/15/2023    11:00 AM   PHQ-2 ( 1999 Pfizer)   Q1: Little interest or pleasure in doing things 0 0   Q2: Feeling down, depressed or hopeless 0 0   PHQ-2 Score 0 0   Q1: Little interest or pleasure in doing things Not at all    Q2: Feeling down, depressed or hopeless Not at all    PHQ-2 Score 0    -- meet with a small group // did individual and group therapy, notes that she has continued to meet with small group, has great support with kiddos and      Research:  Are you interested in being contacted about enrollment in clinical research studies? Yes    Misc:  -- Avoid tobacco use  -- Avoid NSAIDs as there is potentially a 25% chance of causing an IBD flare    Today's Vitals: LMP 05/31/2006   ----------------    I spent 24 minutes with this patient today. All changes were made via collaborative practice agreement with Sheldon Serrato. A copy of the visit note was provided to the patient's provider(s).    A summary of these recommendations was sent via [employer].    Sonia TidwellD, BCACP  MTM Pharmacist   [facility]   Phone: [phone]    Telemedicine Visit Details  Type of service:  Telephone visit  Start Time:  10:02 AM  End Time: 10:27 AM     Medication Therapy Recommendations  Crohn's disease of colon with fistula (H)    Rationale: Preventive therapy - Needs additional medication therapy - Indication   Recommendation: Start Medication - COVID-19 MRNA VACCINE (PFIZER) IM    Status: Accepted - no CPA Needed

## 2024-04-10 NOTE — PATIENT INSTRUCTIONS
"Recommendations from today's MTM visit:                                                    MTM (medication therapy management) is a service provided by a clinical pharmacist designed to help you get the most of out of your medicines.   Today we reviewed what your medicines are for, how to know if they are working, that your medicines are safe and how to make your medicine regimen as easy as possible.      [place] to talk with Dr. Zayas about the hepatitis B vaccine series or hepatitis A/B vaccine series. You can also consider the COVID-19 updated booster.    Will add quantiferon (tuberculosis) screening for 2024 to your infusion.    Follow-up:     -- 6 months for check-in with MTM, sooner if needed    It was great speaking with you today.  I value your experience and would be very thankful for your time in providing feedback in our clinic survey. In the next few days, you may receive an email or text message from [employer] with a link to a survey related to your  clinical pharmacist.\"     To schedule another MTM appointment, please call the clinic directly or you may call the MTM scheduling line at [phone].    My Clinical Pharmacist's contact information:                                                      Please feel free to contact me with any questions or concerns you have.      Sonia TidwellD, BCACP  MTM Pharmacist   [facility]   Phone: [phone]    "

## 2024-04-17 ENCOUNTER — INFUSION THERAPY VISIT (OUTPATIENT)
Dept: INFUSION THERAPY | Facility: CLINIC | Age: 59
End: 2024-04-17
Attending: INTERNAL MEDICINE
Payer: MEDICARE

## 2024-04-17 VITALS
DIASTOLIC BLOOD PRESSURE: 75 MMHG | OXYGEN SATURATION: 95 % | WEIGHT: 178 LBS | BODY MASS INDEX: 29.62 KG/M2 | TEMPERATURE: 98.3 F | SYSTOLIC BLOOD PRESSURE: 125 MMHG | RESPIRATION RATE: 18 BRPM | HEART RATE: 77 BPM

## 2024-04-17 DIAGNOSIS — K50.119 CROHN'S DISEASE OF PERIANAL REGION WITH COMPLICATION (H): Primary | ICD-10-CM

## 2024-04-17 PROCEDURE — 250N000011 HC RX IP 250 OP 636: Mod: JZ | Performed by: INTERNAL MEDICINE

## 2024-04-17 PROCEDURE — 258N000003 HC RX IP 258 OP 636: Performed by: INTERNAL MEDICINE

## 2024-04-17 PROCEDURE — 258N000003 HC RX IP 258 OP 636: Performed by: PHYSICIAN ASSISTANT

## 2024-04-17 PROCEDURE — 96415 CHEMO IV INFUSION ADDL HR: CPT

## 2024-04-17 PROCEDURE — 96413 CHEMO IV INFUSION 1 HR: CPT

## 2024-04-17 RX ORDER — MEPERIDINE HYDROCHLORIDE 25 MG/ML
25 INJECTION INTRAMUSCULAR; INTRAVENOUS; SUBCUTANEOUS EVERY 30 MIN PRN
Status: CANCELLED | OUTPATIENT
Start: 2024-05-29

## 2024-04-17 RX ORDER — ALBUTEROL SULFATE 0.83 MG/ML
2.5 SOLUTION RESPIRATORY (INHALATION)
Status: CANCELLED | OUTPATIENT
Start: 2024-05-29

## 2024-04-17 RX ORDER — METHYLPREDNISOLONE SODIUM SUCCINATE 125 MG/2ML
125 INJECTION, POWDER, LYOPHILIZED, FOR SOLUTION INTRAMUSCULAR; INTRAVENOUS
Status: CANCELLED
Start: 2024-05-29

## 2024-04-17 RX ORDER — DIPHENHYDRAMINE HCL 25 MG
25 CAPSULE ORAL ONCE
Status: CANCELLED
Start: 2024-05-29 | End: 2024-05-29

## 2024-04-17 RX ORDER — ALBUTEROL SULFATE 90 UG/1
1-2 AEROSOL, METERED RESPIRATORY (INHALATION)
Status: CANCELLED
Start: 2024-05-29

## 2024-04-17 RX ORDER — DIPHENHYDRAMINE HYDROCHLORIDE 50 MG/ML
50 INJECTION INTRAMUSCULAR; INTRAVENOUS
Status: CANCELLED
Start: 2024-05-29

## 2024-04-17 RX ORDER — ACETAMINOPHEN 325 MG/1
650 TABLET ORAL ONCE
Status: CANCELLED
Start: 2024-05-29 | End: 2024-05-29

## 2024-04-17 RX ORDER — EPINEPHRINE 1 MG/ML
0.3 INJECTION, SOLUTION INTRAMUSCULAR; SUBCUTANEOUS EVERY 5 MIN PRN
Status: CANCELLED | OUTPATIENT
Start: 2024-05-29

## 2024-04-17 RX ADMIN — SODIUM CHLORIDE 250 ML: 9 INJECTION, SOLUTION INTRAVENOUS at 12:01

## 2024-04-17 RX ADMIN — INFLIXIMAB 800 MG: 100 INJECTION, POWDER, LYOPHILIZED, FOR SOLUTION INTRAVENOUS at 12:00

## 2024-04-17 ASSESSMENT — PAIN SCALES - GENERAL: PAINLEVEL: NO PAIN (0)

## 2024-04-17 NOTE — PROGRESS NOTES
"Infusion Nursing Note:  Abiola Matute presents today for Infliximab.    Patient seen by provider today: No   present during visit today: Not Applicable.    Note: Pt does not take premeds with infusions.   Pt reports that she has \"mouth sores\" which is normal for her when she is due for her infusion.     Intravenous Access:  Peripheral IV placed.    Treatment Conditions:  Biological Infusion Checklist:  ~~~ NOTE: If the patient answers yes to any of the questions below, hold the infusion and contact ordering provider or on-call provider.    Have you recently had an elevated temperature, fever, chills, productive cough, coughing for 3 weeks or longer or hemoptysis,  abnormal vital signs, night sweats,  chest pain or have you noticed a decrease in your appetite, unexplained weight loss or fatigue? No  Do you have any open wounds or new incisions? No  Do you have any upcoming hospitalizations or surgeries? Does not include esophagogastroduodenoscopy, colonoscopy, endoscopic retrograde cholangiopancreatography (ERCP), endoscopic ultrasound (EUS), dental procedures or joint aspiration/steroid injections No  Do you currently have any signs of illness or infection or are you on any antibiotics? No  Have you had any new, sudden or worsening abdominal pain? No  Have you or anyone in your household received a live vaccination in the past 4 weeks? Please note: No live vaccines while on biologic/chemotherapy until 6 months after the last treatment. Patient can receive the flu vaccine (shot only), pneumovax and the Covid vaccine. It is optimal for the patient to get these vaccines mid cycle, but they can be given at any time as long as it is not on the day of the infusion. No  Have you recently been diagnosed with any new nervous system diseases (ie. Multiple sclerosis, Guillain [place], seizures, neurological changes) or cancer diagnosis? Are you on any form of radiation or chemotherapy? No  Are you pregnant or " breast feeding or do you have plans of pregnancy in the future? No  Have you been having any signs of worsening depression or suicidal ideations?  (benlysta only) No  Have there been any other new onset medical symptoms? No  Have you had any new blood clots? (IVIG only) No      Post Infusion Assessment:  Patient tolerated infusion without incident.       Discharge Plan:   Discharge instructions reviewed with: Patient.  Patient and/or family verbalized understanding of discharge instructions and all questions answered.  Patient discharged in stable condition accompanied by: self.  Departure Mode: Ambulatory.      Sandra Gupta RN

## 2024-04-23 ENCOUNTER — OFFICE VISIT (OUTPATIENT)
Dept: GASTROENTEROLOGY | Facility: CLINIC | Age: 59
End: 2024-04-23
Attending: INTERNAL MEDICINE
Payer: MEDICARE

## 2024-04-23 VITALS
OXYGEN SATURATION: 97 % | WEIGHT: 182 LBS | HEART RATE: 82 BPM | BODY MASS INDEX: 30.29 KG/M2 | SYSTOLIC BLOOD PRESSURE: 113 MMHG | TEMPERATURE: 98.2 F | DIASTOLIC BLOOD PRESSURE: 76 MMHG

## 2024-04-23 DIAGNOSIS — K74.60 DECOMPENSATION OF CIRRHOSIS OF LIVER (H): ICD-10-CM

## 2024-04-23 DIAGNOSIS — K72.90 DECOMPENSATION OF CIRRHOSIS OF LIVER (H): ICD-10-CM

## 2024-04-23 PROCEDURE — G0463 HOSPITAL OUTPT CLINIC VISIT: HCPCS | Performed by: INTERNAL MEDICINE

## 2024-04-23 PROCEDURE — 99214 OFFICE O/P EST MOD 30 MIN: CPT | Performed by: INTERNAL MEDICINE

## 2024-04-23 RX ORDER — SPIRONOLACTONE 100 MG/1
100 TABLET, FILM COATED ORAL EVERY MORNING
Qty: 90 TABLET | Refills: 3 | Status: ON HOLD | OUTPATIENT
Start: 2024-04-23 | End: 2024-06-28

## 2024-04-23 RX ORDER — FUROSEMIDE 40 MG
40 TABLET ORAL DAILY
Qty: 90 TABLET | Refills: 3 | Status: ON HOLD | OUTPATIENT
Start: 2024-04-23 | End: 2024-06-28

## 2024-04-23 NOTE — NURSING NOTE
Chief Complaint   Patient presents with    RECHECK     LIVER EVAL RETURN HEPT - 6m follow up     /76 (BP Location: Right arm, Patient Position: Sitting, Cuff Size: Adult Regular)   Pulse 82   Temp 98.2  F (36.8  C) (Oral)   Wt 82.6 kg (182 lb)   LMP 05/31/2006   SpO2 97%   BMI 30.29 kg/m      Aidan Gayle CMA on 4/23/2024 at 11:41 AM

## 2024-04-23 NOTE — PROGRESS NOTES
History of Present Illness: I have reviewed and appreciated the technician's history and test results as outlined above.        CC: Pt is here today for a refraction.  He had a YAG laser on 7/11/2019 and is here for new glasses.  He is seeing really well with the new glasses except for small print.  Blood sugars are high per pt. she also reports diplopia. Patient had previous history of prism.    Slit Lamp exam:  LIDS, LASHES, LACRIMAL: OD (RIGHT EYE) // normal  OS (LEFT EYE): // normal    CONJUCTIVA OD (RIGHT EYE):// clear OS (LEFT EYE) :// clear    CORNEA:  OD (RIGHT EYE):// clear OS (LEFT EYE):// clear   ANTERIOR CHAMBER:  OD (RIGHT EYE):// deep and quiet OS (LEFT EYE): // deep and quiet    IRIS:  OD (RIGHT EYE)://round and regular without neovascularization OS (LEFT EYE): // round and regular without neovascularization   LENS:  OD (RIGHT EYE): // PC IOL, well centered, stable with clear posterior capsulotomy OS (LEFT EYE): // PC IOL, well centered, stable with 1+ posterior capsular opacity          DX (DIAGNOSIS):    OS After  Cataract . Diplopia    TX (TREATMENT):  Copy of MR  Patient is generally getting along okay and will be monitored     [facility]  LIVER CLINIC FOLLOW UP    A/P  Ms. Matute is a 58 Y F with DUNN and alcohol related cirrhosis and HCC.  She is listed for LT since 3/1/24. MELD 9 ABO A    HCC 3.2 cm seg 7. . She has been discussed at tumor conference and has seen IR and oncology as well as Dr. Saavedra. She is post MWA 8/29/23. Recent MRI looked like treatment was as expected. Next MRI 5/6/24    Cholecystitis Cystic duct stent placed in 2020. Follow up imaging showed expected migration. No issues since then.  Variceal screening  No varices on EGD 2019. No varices 5/11/23. Due 3 years  Thrombocytopenia 2/2 ETOH and cirrhosis. Plt around 100.    Ascites Controlled with diuretics. Discussed this and low Na. [place] 100 mg and furosemide 40. Renewed.  Cramping  1. tonic water  2. magnesium supplement  3. zinc 220 bid  4. vitamin E 200 mg po tid  If none of these works, will have to cut back on diuretics.     AUD positive PETH 1/20/23. Low positive. Discussed that absolute abstinence is required.    Bone health DEXA showed osteopenia. Taking ca and D. DEXA Due 2022    Fatigue discussed that this can be from cirrhosis. We discussed short nap and exercise. She has a hard time with her heels due to heel issues with skin.    Crohns With chronic anorecal fistula and currently with [facility]. Sees Dr Schilling and Leonel Wilkinson (1/6/23). She is on infliximab. Have communicated with Dr. Schilling regarding her current diagnoses to ask if it would impact timing of treatments and scoping. He will review her chart and get back to me.    LE cellulitis due to skin cracks in feet. Has seen podiatry.    LT Candidacy She is listed      RTC 6 m  Jeannette Cervantes MD  Hepatology/Liver Transplant  Medical Director, Liver Transplantation  [facility]  Subjective  Ms. Matute is a 58 Y F with alcohol related cirrhosis and HCC. She is listed for LT    She underwent laparoscopic MW ablation of her HCC with Dr. Saavedra on 8/29/23.     She feels  more fatigued lately. She wonders if this is relation to having her grandchildren (1.5 and 2.5) two days a week.       Diagnosis of liver disease was made in 2019 when she was having colon evaluations with a diagnosis of perianal Crohn's and her elevated liver tests were addressed.    Admitted [facility] 4/21/20 for acute on chronic cholecystitis, underwent ERCP with cystic stent, pancreatic stent. AXR 5/20/20 showed cystic duct stent with migration.     She was in ED 12/31/22 for leg pain and chest pain. Diagnosis was cellulitis. As part of the workup she had CT chest and incidental lesion in liver was seen.     HCC  MRI 1/19/23   There is approximately 3.2 x 2.9 cm arterial enhancing lesion in  hepatic segment 7 along the right hemidiaphragm, with evidence of  washout and pseudocapsule, consistent with OPTN/LIRADS class 5B  Lesion      Liver biopsy 4/24/19 read by Dr. Ger Montoya  1. Steatohepatitis     a. Moderate steatosis (35%, mixed macro and microvesicular, non-zonal)     b. Mild necroinflammatory activity (grade 1 of 3)     c. Well-developed micronodular cirrhosis (stage 4 of 4)  2. Negative for autoimmune hepatitis or other intercurrent liver disease   3. See comment    A) Needle biopsy of liver provides an adequate sample, 1.8 cm in aggregate length, with approximately nine portal areas present. Bile ducts are intact. Well-developed micronodular cirrhosis is associated with overt steatohepatitis. Prominent Linh's hyaline is present, somewhat suggestive of alcoholic etiology. There is no significant lymphoplasma cellular infiltrate and no interface hepatitis to support diagnosis of autoimmune hepatitis. Trichrome stain confirms micronodular cirrhosis. An iron stain is negative.    Reviewed with Dr. Cardona.   53-year-old woman presents with abnormal liver tests and cross sectional CT suggestive of cirrhosis. The patient has been diagnosed with portal hypertension and has anal changes clinically  suggestive of Crohn's disease. Serum chemistries include AST 99, ALT 65, alkaline phosphatase 151, total bilirubin 2.4, mildly elevated IgG and IgM and elevated F-actin at 28. Antinuclear antibody is negative.     AUD  Reports last alcohol around 6/2022. Has an NA beer on occasion.  Alcohol pattern had been 3-4 times per week, 2-6 beers depending on activities.   No CD treatment in past, no DWI. She is engage in CD treatment. Probably end around November.  PETH 1/20/23 was 59    Ascites  Para 10/9/19 3700 ml  This has resolved on kathleen 100 and furosemide 40    Crohns  She was started on infliximab 2019. She has perianal disease which Dr. Schilling describes and inactive at the time of his last note 10/4/22. The patient states it is active and she has a Seton.    Risk factors for fatty liver: was obese in the past, typically 200 pounds. No DM. HTN    Lab Test 02/28/24  1717   PROTTOTAL 7.6   ALBUMIN 3.6   BILITOTAL 1.9*   ALKPHOS 152*   AST 78*   ALT 42     Lab Test 02/21/24  1314   WBC 4.4   RBC 4.08   HGB 14.1   HCT 41.8   *   MCH 34.6*   MCHC 33.7   RDW 14.5   *       Lab Test 01/20/23  0906   PROTTOTAL 8.4*   ALBUMIN 3.8   BILITOTAL 1.5*   ALKPHOS 116*   AST 89*   ALT 58*     CBC RESULTS: Recent Labs   Lab Test 01/20/23  0906   WBC 4.9   RBC 4.19   HGB 15.0   HCT 45.1   *   MCH 35.8*   MCHC 33.3   RDW 13.7             Lab Test 12/16/21  1038   PROTTOTAL 7.7   ALBUMIN 3.1*   BILITOTAL 1.3   ALKPHOS 111   AST 57*   ALT 49     Lab Test 12/16/21  1038   WBC 3.6*   RBC 4.04   HGB 14.1   HCT 41.5   *   MCH 34.9*   MCHC 34.0   RDW 13.1   PLT 98*       MELD 3.0: 13 at 2/28/2024  5:17 PM  MELD-Na: 11 at 2/28/2024  5:17 PM  Calculated from:  Serum Creatinine: 0.73 mg/dL (Using min of 1 mg/dL) at 2/28/2024  5:17 PM  Serum Sodium: 135 mmol/L at 2/28/2024  5:17 PM  Total Bilirubin: 1.9 mg/dL at 2/28/2024  5:17 PM  Serum Albumin: 3.6 g/dL (Using max of 3.5 g/dL) at 2/28/2024  5:17  PM  INR(ratio): 1.21 at 2/28/2024  5:17 PM  Age at listing (hypothetical): 58 years  Sex: Female at 2/28/2024  5:17 PM      HCV neg  HBV neg  JILL neg  Factin 28  AMA neg  Iron panel sat 59, ferritin 349  No HFE testing  Liver biopsy c/w alcoholic liver disease    Past Medical History  Past Medical History:   Diagnosis Date    Alcohol abuse     Alcoholic cirrhosis of liver with ascites     Anal fistula     Atypical ductal hyperplasia of breast 09/10/2010    ERT not recommended -left - and flat epithelial atypia-scheduled for breast biopsy 9/17/2010     Bifid uvula     Cholelithiasis     Contact perianal dermatitis and other eczema     recurrent - clobetasol     Crohn disease     Fear of flying      - gets Ativan prn.     HCC (hepatocellular carcinoma) (H) 2/1/2023    Hypertension     IBS (irritable bowel syndrome)      Social History  Social History     Socioeconomic History    Marital status:      Spouse name: Albino    Number of children: 3    Years of education: 14    Highest education level: Not on file   Occupational History    Occupation: unemployed   Tobacco Use    Smoking status: Never     Passive exposure: Never    Smokeless tobacco: Never   Vaping Use    Vaping status: Never Used   Substance and Sexual Activity    Alcohol use: Not Currently    Drug use: No    Sexual activity: Yes     Partners: Male     Birth control/protection: Post-menopausal     Comment: husb had vasectomy   Other Topics Concern     Service No    Blood Transfusions No    Caffeine Concern No     Comment: rarely drinks caffeine    Occupational Exposure Not Asked    Hobby Hazards Not Asked    Sleep Concern Not Asked    Stress Concern Not Asked    Weight Concern Not Asked    Special Diet Not Asked    Back Care Not Asked    Exercise Yes     Comment: does a lot of walking - 4x/week    Bike Helmet Not Asked    Seat Belt Yes     Comment: always    Self-Exams Yes     Comment: SBE encouraged monthly    Parent/sibling w/ CABG, MI or  angioplasty before 65F 55M? Yes   Social History Narrative    calcium - drinks 5-6 large glasses skim milk/day    flex sig/colonoscopy -at age 49    sun precautions - discussed    mammogram - needs every 2 years in her 30's, then yearly from then on    Td booster - 9/99 and 4/27/2010    pneumovax -at age 59    DEXA -when perimenopausal    stool hemoccults - every year after age 40    ASA- start at age 40    mulvitamin - encouraged     Social Determinants of Health     Financial Resource Strain: Low Risk  (2/4/2024)    Financial Resource Strain     Within the past 12 months, have you or your family members you live with been unable to get utilities (heat, electricity) when it was really needed?: No   Food Insecurity: Low Risk  (2/4/2024)    Food Insecurity     Within the past 12 months, did you worry that your food would run out before you got money to buy more?: No     Within the past 12 months, did the food you bought just not last and you didn t have money to get more?: No   Transportation Needs: Low Risk  (2/4/2024)    Transportation Needs     Within the past 12 months, has lack of transportation kept you from medical appointments, getting your medicines, non-medical meetings or appointments, work, or from getting things that you need?: No   Physical Activity: Unknown (9/15/2020)    Exercise Vital Sign     Days of Exercise per Week: 0 days     Minutes of Exercise per Session: Not on file   Stress: Stress Concern Present (9/15/2020)    [employer] - Occupational Stress Questionnaire     Feeling of Stress : Rather much   Social Connections: Unknown (9/15/2020)    Social Connection and Isolation Panel [NHANES]     Frequency of Communication with Friends and Family: More than three times a week     Frequency of Social Gatherings with Friends and Family: More than three times a week     Attends Holiness Services: Not on file     Active Member of Clubs or Organizations: Not on file      Attends Club or Organization Meetings: Not on file     Marital Status: Not on file   Interpersonal Safety: Low Risk  (2024)    Interpersonal Safety     Do you feel physically and emotionally safe where you currently live?: Yes     Within the past 12 months, have you been hit, slapped, kicked or otherwise physically hurt by someone?: No     Within the past 12 months, have you been humiliated or emotionally abused in other ways by your partner or ex-partner?: No   Housing Stability: Low Risk  (2024)    Housing Stability     Do you have housing? : Yes     Are you worried about losing your housing?: No   Not currently working since 2019. Got laid off and hasn't returned due to health.     Family History  Family History   Problem Relation Age of Onset    Breast Cancer Mother     Gastrointestinal Disease Mother     Heart Disease Father 57    Hypertension Maternal Grandmother     Substance Abuse Maternal Grandfather     Diabetes Maternal Grandfather     Diabetes Paternal Grandmother     Heart Disease Paternal Grandfather     Cancer Sister     Skin Cancer Sister     Substance Abuse Maternal Uncle     Substance Abuse Maternal Uncle     Suicide Niece     Suicide Niece     Anesthesia Reaction No family hx of     Thrombosis No family hx of    F  from heart disease    ROS 10 point ROS neg other than the symptoms noted above in the HPI.  Exam  /76 (BP Location: Right arm, Patient Position: Sitting, Cuff Size: Adult Regular)   Pulse 82   Temp 98.2  F (36.8  C) (Oral)   Wt 82.6 kg (182 lb)   LMP 2006   SpO2 97%   BMI 30.29 kg/m      Gen Alert pleasant NAD  Resp No difficulty breathing. No cough  Skin No Jaundice  Eyes No icterus  Neuro HERRERA  MSK no muscle wasting

## 2024-04-23 NOTE — LETTER
4/23/2024         RE: Abiola Matute  [address] [facility] [id number]        Dear Colleague,    Thank you for referring your patient, Abiola Matute, to the [facility] HEPATOLOG[facility] [place]. Please see a copy of my visit note below.    [facility]  LIVER CLINIC FOLLOW UP    A/P  Ms. Matute is a 58 Y F with DUNN and alcohol related cirrhosis and HCC.  She is listed for LT since 3/1/24. MELD 9 ABO A    HCC 3.2 cm seg 7. . She has been discussed at tumor conference and has seen IR and oncology as well as Dr. Saavedra. She is post MWA 8/29/23. Recent MRI looked like treatment was as expected. Next MRI 5/6/24    Cholecystitis Cystic duct stent placed in 2020. Follow up imaging showed expected migration. No issues since then.  Variceal screening  No varices on EGD 2019. No varices 5/11/23. Due 3 years  Thrombocytopenia 2/2 ETOH and cirrhosis. Plt around 100.    Ascites Controlled with diuretics. Discussed this and low Na. Stevens 100 mg and furosemide 40. Renewed.  Cramping  1. tonic water  2. magnesium supplement  3. zinc 220 bid  4. vitamin E 200 mg po tid  If none of these works, will have to cut back on diuretics.     AUD positive PETH 1/20/23. Low positive. Discussed that absolute abstinence is required.    Bone health DEXA showed osteopenia. Taking ca and D. DEXA Due 2022    Fatigue discussed that this can be from cirrhosis. We discussed short nap and exercise. She has a hard time with her heels due to heel issues with skin.    Crohns With chronic anorecal fistula and currently with Seton. Sees Dr Schilling and Leonel Wilkinson (1/6/23). She is on infliximab. Have communicated with Dr. Schilling regarding her current diagnoses to ask if it would impact timing of treatments and scoping. He will review her chart and get back to me.    LE cellulitis due to skin cracks in feet. Has seen podiatry.    LT Candidacy She is listed      RTC 6 m  Jeannette Cervantes MD  Hepatology/Liver  Transplant  Medical Director, Liver Transplantation  [facility]  Subjective  Ms. Matute is a 58 Y F with alcohol related cirrhosis and HCC. She is listed for LT    She underwent laparoscopic MW ablation of her HCC with Dr. Saavedra on 8/29/23.     She feels more fatigued lately. She wonders if this is relation to having her grandchildren (1.5 and 2.5) two days a week.       Diagnosis of liver disease was made in 2019 when she was having colon evaluations with a diagnosis of perianal Crohn's and her elevated liver tests were addressed.    Admitted [facility] 4/21/20 for acute on chronic cholecystitis, underwent ERCP with cystic stent, pancreatic stent. AXR 5/20/20 showed cystic duct stent with migration.     She was in ED 12/31/22 for leg pain and chest pain. Diagnosis was cellulitis. As part of the workup she had CT chest and incidental lesion in liver was seen.     HCC  MRI 1/19/23   There is approximately 3.2 x 2.9 cm arterial enhancing lesion in  hepatic segment 7 along the right hemidiaphragm, with evidence of  washout and pseudocapsule, consistent with OPTN/LIRADS class 5B  Lesion      Liver biopsy 4/24/19 read by Dr. Ger Montoya  1. Steatohepatitis     a. Moderate steatosis (35%, mixed macro and microvesicular, non-zonal)     b. Mild necroinflammatory activity (grade 1 of 3)     c. Well-developed micronodular cirrhosis (stage 4 of 4)  2. Negative for autoimmune hepatitis or other intercurrent liver disease   3. See comment    A) Needle biopsy of liver provides an adequate sample, 1.8 cm in aggregate length, with approximately nine portal areas present. Bile ducts are intact. Well-developed micronodular cirrhosis is associated with overt steatohepatitis. Prominent Linh's hyaline is present, somewhat suggestive of alcoholic etiology. There is no significant lymphoplasma cellular infiltrate and no interface hepatitis to support diagnosis of autoimmune hepatitis. Trichrome stain confirms  micronodular cirrhosis. An iron stain is negative.    Reviewed with Dr. Cardona.   53-year-old woman presents with abnormal liver tests and cross sectional CT suggestive of cirrhosis. The patient has been diagnosed with portal hypertension and has anal changes clinically suggestive of Crohn's disease. Serum chemistries include AST 99, ALT 65, alkaline phosphatase 151, total bilirubin 2.4, mildly elevated IgG and IgM and elevated F-actin at 28. Antinuclear antibody is negative.     AUD  Reports last alcohol around 6/2022. Has an NA beer on occasion.  Alcohol pattern had been 3-4 times per week, 2-6 beers depending on activities.   No CD treatment in past, no DWI. She is engage in CD treatment. Probably end around November.  PETH 1/20/23 was 59    Ascites  Para 10/9/19 3700 ml  This has resolved on kathleen 100 and furosemide 40    Crohns  She was started on infliximab 2019. She has perianal disease which Dr. Schilling describes and inactive at the time of his last note 10/4/22. The patient states it is active and she has a Seton.    Risk factors for fatty liver: was obese in the past, typically 200 pounds. No DM. HTN    Lab Test 02/28/24  1717   PROTTOTAL 7.6   ALBUMIN 3.6   BILITOTAL 1.9*   ALKPHOS 152*   AST 78*   ALT 42     Lab Test 02/21/24  1314   WBC 4.4   RBC 4.08   HGB 14.1   HCT 41.8   *   MCH 34.6*   MCHC 33.7   RDW 14.5   *       Lab Test 01/20/23  0906   PROTTOTAL 8.4*   ALBUMIN 3.8   BILITOTAL 1.5*   ALKPHOS 116*   AST 89*   ALT 58*     CBC RESULTS: Recent Labs   Lab Test 01/20/23  0906   WBC 4.9   RBC 4.19   HGB 15.0   HCT 45.1   *   MCH 35.8*   MCHC 33.3   RDW 13.7             Lab Test 12/16/21  1038   PROTTOTAL 7.7   ALBUMIN 3.1*   BILITOTAL 1.3   ALKPHOS 111   AST 57*   ALT 49     Lab Test 12/16/21  1038   WBC 3.6*   RBC 4.04   HGB 14.1   HCT 41.5   *   MCH 34.9*   MCHC 34.0   RDW 13.1   PLT 98*       MELD 3.0: 13 at 2/28/2024  5:17 PM  MELD-Na: 11 at 2/28/2024  5:17  PM  Calculated from:  Serum Creatinine: 0.73 mg/dL (Using min of 1 mg/dL) at 2/28/2024  5:17 PM  Serum Sodium: 135 mmol/L at 2/28/2024  5:17 PM  Total Bilirubin: 1.9 mg/dL at 2/28/2024  5:17 PM  Serum Albumin: 3.6 g/dL (Using max of 3.5 g/dL) at 2/28/2024  5:17 PM  INR(ratio): 1.21 at 2/28/2024  5:17 PM  Age at listing (hypothetical): 58 years  Sex: Female at 2/28/2024  5:17 PM      HCV neg  HBV neg  JILL neg  Factin 28  AMA neg  Iron panel sat 59, ferritin 349  No HFE testing  Liver biopsy c/w alcoholic liver disease    Past Medical History  Past Medical History:   Diagnosis Date     Alcohol abuse      Alcoholic cirrhosis of liver with ascites      Anal fistula      Atypical ductal hyperplasia of breast 09/10/2010    ERT not recommended -left - and flat epithelial atypia-scheduled for breast biopsy 9/17/2010      Bifid uvula      Cholelithiasis      Contact perianal dermatitis and other eczema     recurrent - clobetasol      Crohn disease      Fear of flying      - gets Ativan prn.      HCC (hepatocellular carcinoma) (H) 2/1/2023     Hypertension      IBS (irritable bowel syndrome)      Social History  Social History     Socioeconomic History     Marital status:      Spouse name: Albino     Number of children: 3     Years of education: 14     Highest education level: Not on file   Occupational History     Occupation: unemployed   Tobacco Use     Smoking status: Never     Passive exposure: Never     Smokeless tobacco: Never   Vaping Use     Vaping status: Never Used   Substance and Sexual Activity     Alcohol use: Not Currently     Drug use: No     Sexual activity: Yes     Partners: Male     Birth control/protection: Post-menopausal     Comment: chazb had vasectomy   Other Topics Concern      Service No     Blood Transfusions No     Caffeine Concern No     Comment: rarely drinks caffeine     Occupational Exposure Not Asked     Hobby Hazards Not Asked     Sleep Concern Not Asked     Stress Concern Not  Asked     Weight Concern Not Asked     Special Diet Not Asked     Back Care Not Asked     Exercise Yes     Comment: does a lot of walking - 4x/week     Bike Helmet Not Asked     Seat Belt Yes     Comment: always     Self-Exams Yes     Comment: SBE encouraged monthly     Parent/sibling w/ CABG, MI or angioplasty before 65F 55M? Yes   Social History Narrative    calcium - drinks 5-6 large glasses skim milk/day    flex sig/colonoscopy -at age 49    sun precautions - discussed    mammogram - needs every 2 years in her 30's, then yearly from then on    Td booster - 9/99 and 4/27/2010    pneumovax -at age 59    DEXA -when perimenopausal    stool hemoccults - every year after age 40    ASA- start at age 40    mulvitamin - encouraged     Social Determinants of Health     Financial Resource Strain: Low Risk  (2/4/2024)    Financial Resource Strain      Within the past 12 months, have you or your family members you live with been unable to get utilities (heat, electricity) when it was really needed?: No   Food Insecurity: Low Risk  (2/4/2024)    Food Insecurity      Within the past 12 months, did you worry that your food would run out before you got money to buy more?: No      Within the past 12 months, did the food you bought just not last and you didn t have money to get more?: No   Transportation Needs: Low Risk  (2/4/2024)    Transportation Needs      Within the past 12 months, has lack of transportation kept you from medical appointments, getting your medicines, non-medical meetings or appointments, work, or from getting things that you need?: No   Physical Activity: Unknown (9/15/2020)    Exercise Vital Sign      Days of Exercise per Week: 0 days      Minutes of Exercise per Session: Not on file   Stress: Stress Concern Present (9/15/2020)    [employer] [place] [employer] - Occupational Stress Questionnaire      Feeling of Stress : Rather much   Social Connections: Unknown (9/15/2020)    Social Connection and  Isolation Panel [NHANES]      Frequency of Communication with Friends and Family: More than three times a week      Frequency of Social Gatherings with Friends and Family: More than three times a week      Attends Scientology Services: Not on file      Active Member of Clubs or Organizations: Not on file      Attends Club or Organization Meetings: Not on file      Marital Status: Not on file   Interpersonal Safety: Low Risk  (2024)    Interpersonal Safety      Do you feel physically and emotionally safe where you currently live?: Yes      Within the past 12 months, have you been hit, slapped, kicked or otherwise physically hurt by someone?: No      Within the past 12 months, have you been humiliated or emotionally abused in other ways by your partner or ex-partner?: No   Housing Stability: Low Risk  (2024)    Housing Stability      Do you have housing? : Yes      Are you worried about losing your housing?: No   Not currently working since 2019. Got laid off and hasn't returned due to health.     Family History  Family History   Problem Relation Age of Onset     Breast Cancer Mother      Gastrointestinal Disease Mother      Heart Disease Father 57     Hypertension Maternal Grandmother      Substance Abuse Maternal Grandfather      Diabetes Maternal Grandfather      Diabetes Paternal Grandmother      Heart Disease Paternal Grandfather      Cancer Sister      Skin Cancer Sister      Substance Abuse Maternal Uncle      Substance Abuse Maternal Uncle      Suicide Niece      Suicide Niece      Anesthesia Reaction No family hx of      Thrombosis No family hx of    F  from heart disease    ROS 10 point ROS neg other than the symptoms noted above in the HPI.  Exam  /76 (BP Location: Right arm, Patient Position: Sitting, Cuff Size: Adult Regular)   Pulse 82   Temp 98.2  F (36.8  C) (Oral)   Wt 82.6 kg (182 lb)   LMP 2006   SpO2 97%   BMI 30.29 kg/m      Gen Alert pleasant NAD  Resp No  difficulty breathing. No cough  Skin No Jaundice  Eyes No icterus  Neuro HERRERA  MSK no muscle wasting        Again, thank you for allowing me to participate in the care of your patient.        Sincerely,        Jeannette Cervantes MD

## 2024-04-24 ENCOUNTER — OFFICE VISIT (OUTPATIENT)
Dept: FAMILY MEDICINE | Facility: CLINIC | Age: 59
End: 2024-04-24
Payer: MEDICARE

## 2024-04-24 VITALS
TEMPERATURE: 98.2 F | OXYGEN SATURATION: 99 % | RESPIRATION RATE: 16 BRPM | WEIGHT: 185.1 LBS | HEART RATE: 83 BPM | SYSTOLIC BLOOD PRESSURE: 110 MMHG | DIASTOLIC BLOOD PRESSURE: 72 MMHG | HEIGHT: 65 IN | BODY MASS INDEX: 30.84 KG/M2

## 2024-04-24 DIAGNOSIS — E66.811 CLASS 1 OBESITY WITH SERIOUS COMORBIDITY AND BODY MASS INDEX (BMI) OF 30.0 TO 30.9 IN ADULT, UNSPECIFIED OBESITY TYPE: Primary | ICD-10-CM

## 2024-04-24 PROCEDURE — 99213 OFFICE O/P EST LOW 20 MIN: CPT | Performed by: NURSE PRACTITIONER

## 2024-04-24 NOTE — PROGRESS NOTES
"  Assessment & Plan     Virginia was seen today for recheck medication and weight loss.    Diagnoses and all orders for this visit:    Class 1 obesity with serious comorbidity and body mass index (BMI) of 30.0 to 30.9 in adult, unspecified obesity type  Last seen 2/7/24 where we discussed GLP-1 agonists and Mounjaro was initiated.    Prior authorization was denied by Medicare.   Will plan initiation of Semaglutide, compounded, prescriptions sent to  compounding pharmacy.    -     COMPOUNDED NON-CONTROLLED SUBSTANCE (CMPD RX) - PHARMACY TO MIX COMPOUNDED MEDICATION; Compounded Semaglutide 0.25 mg subcutaneous injection once weekly for 28 days  -     COMPOUNDED NON-CONTROLLED SUBSTANCE (CMPD RX) - PHARMACY TO MIX COMPOUNDED MEDICATION; Compounded Semaglutide 0.5 mg subcutaneous injection once weekly      BMI  Estimated body mass index is 30.8 kg/m  as calculated from the following:    Height as of this encounter: 1.651 m (5' 5\").    Weight as of this encounter: 84 kg (185 lb 1.6 oz).     Return in about 2 months (around 6/24/2024) for Med check, Video Visit.        Subjective   Virginia is a 58 year old, presenting for the following health issues:  Recheck Medication and Weight Loss        4/24/2024     8:17 AM   Additional Questions   Roomed by Lory JHA.     Via the Health Maintenance questionnaire, the patient has reported the following services have been completed -Mammogram, this information has been sent to the abstraction team.  History of Present Illness       Reason for visit:  Follow up in regard to ozempic and potentially other options    She eats 2-3 servings of fruits and vegetables daily.She consumes 0 sweetened beverage(s) daily.She exercises with enough effort to increase her heart rate 10 to 19 minutes per day.  She exercises with enough effort to increase her heart rate 3 or less days per week.   She is taking medications regularly.     Weight loss medication   Last seen 2/7/24 where we discussed GLP-1 " "agonists and Mounjaro was initiated.    Prior authorization was denied.   Received a letter from Medicare and there is no coverage of weight loss medications.         Last seen by hepatologist - Dr. Cervantes 4/23/24.    Dr. Cervantes recommend 20-25 lbs of weight loss prior to liver transplantation.      Bilateral heels with rash, cracking.   Treated with Clobetasol ointment.    Last seen by dermatology in 2023.      Review of Systems  Constitutional, HEENT, cardiovascular, pulmonary, gi and gu systems are negative, except as otherwise noted.      Objective    /72   Pulse 83   Temp 98.2  F (36.8  C) (Oral)   Resp 16   Ht 1.651 m (5' 5\")   Wt 84 kg (185 lb 1.6 oz)   LMP 05/31/2006   SpO2 99%   BMI 30.80 kg/m    Body mass index is 30.8 kg/m .  Physical Exam     GENERAL: alert and no distress  SKIN: bilateral heels with erythematous, scaly rash, superficial cracks visible on left heel  PSYCH: mentation appears normal, affect normal/bright      Signed Electronically by: Linda Macdonald, DARYN CNP    "

## 2024-05-06 ENCOUNTER — HOSPITAL ENCOUNTER (OUTPATIENT)
Dept: MRI IMAGING | Facility: CLINIC | Age: 59
Discharge: HOME | End: 2024-05-06
Attending: INTERNAL MEDICINE | Admitting: INTERNAL MEDICINE
Payer: MEDICARE

## 2024-05-06 DIAGNOSIS — C22.0 HCC (HEPATOCELLULAR CARCINOMA) (H): ICD-10-CM

## 2024-05-06 PROCEDURE — 74183 MRI ABD W/O CNTR FLWD CNTR: CPT | Mod: MG

## 2024-05-06 PROCEDURE — A9585 GADOBUTROL INJECTION: HCPCS | Performed by: INTERNAL MEDICINE

## 2024-05-06 PROCEDURE — 74183 MRI ABD W/O CNTR FLWD CNTR: CPT | Mod: 26 | Performed by: RADIOLOGY

## 2024-05-06 PROCEDURE — G1010 CDSM STANSON: HCPCS | Performed by: RADIOLOGY

## 2024-05-06 PROCEDURE — 255N000002 HC RX 255 OP 636: Performed by: INTERNAL MEDICINE

## 2024-05-06 RX ORDER — GADOBUTROL 604.72 MG/ML
8.5 INJECTION INTRAVENOUS ONCE
Status: COMPLETED | OUTPATIENT
Start: 2024-05-06 | End: 2024-05-06

## 2024-05-06 RX ADMIN — GADOBUTROL 8.5 ML: 604.72 INJECTION INTRAVENOUS at 12:33

## 2024-05-10 ENCOUNTER — DOCUMENTATION ONLY (OUTPATIENT)
Dept: TRANSPLANT | Facility: CLINIC | Age: 59
End: 2024-05-10
Payer: MEDICARE

## 2024-05-10 DIAGNOSIS — C22.0 HCC (HEPATOCELLULAR CARCINOMA) (H): Primary | ICD-10-CM

## 2024-05-10 NOTE — PROGRESS NOTES
[facility] LIVER TUMOR BOARD NOTE  Patient discussed at the multidisciplinary tumor board on 5-. The attendees may consist of representatives from hepatology, oncology, radiation oncology, surgical subspecialty including liver transplant and radiology.     DATE OF TUMOR BOARD: May 10, 2024      SCAN REVIEWED: 5/6/2024 MRI, Reviewed by Dr. Rush Frank    Discussion:   -Persistent enhancement anterior to ablation zone, less conspicuous on more recent MRI, strongly favored for post-treatment change.  -No new lesions    Plan:  -Continue with surveillance imaging

## 2024-05-15 ENCOUNTER — LAB (OUTPATIENT)
Dept: LAB | Facility: CLINIC | Age: 59
End: 2024-05-15
Payer: MEDICARE

## 2024-05-15 DIAGNOSIS — K70.31 ALCOHOLIC CIRRHOSIS OF LIVER WITH ASCITES (H): ICD-10-CM

## 2024-05-15 DIAGNOSIS — C22.0 HCC (HEPATOCELLULAR CARCINOMA) (H): ICD-10-CM

## 2024-05-15 LAB
ALBUMIN SERPL BCG-MCNC: 3.9 G/DL (ref 3.5–5.2)
ALP SERPL-CCNC: 142 U/L (ref 40–150)
ALT SERPL W P-5'-P-CCNC: 53 U/L (ref 0–50)
ANION GAP SERPL CALCULATED.3IONS-SCNC: 8 MMOL/L (ref 7–15)
AST SERPL W P-5'-P-CCNC: 88 U/L (ref 0–45)
BILIRUB DIRECT SERPL-MCNC: 0.6 MG/DL (ref 0–0.3)
BILIRUB SERPL-MCNC: 2.4 MG/DL
BUN SERPL-MCNC: 10.7 MG/DL (ref 8–23)
CALCIUM SERPL-MCNC: 9.3 MG/DL (ref 8.6–10)
CHLORIDE SERPL-SCNC: 99 MMOL/L (ref 98–107)
CREAT SERPL-MCNC: 0.71 MG/DL (ref 0.51–0.95)
DEPRECATED HCO3 PLAS-SCNC: 26 MMOL/L (ref 22–29)
EGFRCR SERPLBLD CKD-EPI 2021: >90 ML/MIN/1.73M2
GLUCOSE SERPL-MCNC: 92 MG/DL (ref 70–99)
INR PPP: 1.12 (ref 0.85–1.15)
POTASSIUM SERPL-SCNC: 4.3 MMOL/L (ref 3.4–5.3)
PROT SERPL-MCNC: 8.6 G/DL (ref 6.4–8.3)
SODIUM SERPL-SCNC: 133 MMOL/L (ref 135–145)

## 2024-05-15 PROCEDURE — 85610 PROTHROMBIN TIME: CPT

## 2024-05-15 PROCEDURE — 80321 ALCOHOLS BIOMARKERS 1OR 2: CPT | Mod: GA

## 2024-05-15 PROCEDURE — 80053 COMPREHEN METABOLIC PANEL: CPT

## 2024-05-15 PROCEDURE — 36415 COLL VENOUS BLD VENIPUNCTURE: CPT

## 2024-05-15 PROCEDURE — 82105 ALPHA-FETOPROTEIN SERUM: CPT

## 2024-05-16 LAB — AFP SERPL-MCNC: 59.5 NG/ML

## 2024-05-21 DIAGNOSIS — C22.0 HCC (HEPATOCELLULAR CARCINOMA) (H): Primary | ICD-10-CM

## 2024-06-12 ENCOUNTER — LAB (OUTPATIENT)
Dept: LAB | Facility: CLINIC | Age: 59
End: 2024-06-12
Attending: INTERNAL MEDICINE
Payer: MEDICARE

## 2024-06-12 ENCOUNTER — INFUSION THERAPY VISIT (OUTPATIENT)
Dept: INFUSION THERAPY | Facility: CLINIC | Age: 59
End: 2024-06-12
Attending: INTERNAL MEDICINE
Payer: MEDICARE

## 2024-06-12 ENCOUNTER — HOSPITAL ENCOUNTER (OUTPATIENT)
Dept: MRI IMAGING | Facility: CLINIC | Age: 59
Discharge: HOME | End: 2024-06-12
Attending: INTERNAL MEDICINE
Payer: MEDICARE

## 2024-06-12 VITALS
WEIGHT: 174 LBS | HEART RATE: 71 BPM | BODY MASS INDEX: 28.96 KG/M2 | OXYGEN SATURATION: 100 % | DIASTOLIC BLOOD PRESSURE: 75 MMHG | TEMPERATURE: 97.9 F | SYSTOLIC BLOOD PRESSURE: 109 MMHG | RESPIRATION RATE: 16 BRPM

## 2024-06-12 DIAGNOSIS — K70.31 ALCOHOLIC CIRRHOSIS OF LIVER WITH ASCITES (H): ICD-10-CM

## 2024-06-12 DIAGNOSIS — C22.0 HCC (HEPATOCELLULAR CARCINOMA) (H): ICD-10-CM

## 2024-06-12 DIAGNOSIS — K50.113 CROHN'S DISEASE OF LARGE INTESTINE WITH FISTULA (H): ICD-10-CM

## 2024-06-12 DIAGNOSIS — K50.119 CROHN'S DISEASE OF PERIANAL REGION WITH COMPLICATION (H): Primary | ICD-10-CM

## 2024-06-12 LAB
AFP SERPL-MCNC: 115 NG/ML
ALBUMIN SERPL BCG-MCNC: 3.6 G/DL (ref 3.5–5.2)
ALBUMIN SERPL BCG-MCNC: 3.7 G/DL (ref 3.5–5.2)
ALP SERPL-CCNC: 108 U/L (ref 40–150)
ALP SERPL-CCNC: 121 U/L (ref 40–150)
ALT SERPL W P-5'-P-CCNC: 41 U/L (ref 0–50)
ALT SERPL W P-5'-P-CCNC: 42 U/L (ref 0–50)
ANION GAP SERPL CALCULATED.3IONS-SCNC: 10 MMOL/L (ref 7–15)
AST SERPL W P-5'-P-CCNC: 66 U/L (ref 0–45)
AST SERPL W P-5'-P-CCNC: ABNORMAL U/L
BASOPHILS # BLD AUTO: 0 10E3/UL (ref 0–0.2)
BASOPHILS # BLD AUTO: 0 10E3/UL (ref 0–0.2)
BASOPHILS NFR BLD AUTO: 1 %
BASOPHILS NFR BLD AUTO: 1 %
BILIRUB DIRECT SERPL-MCNC: 0.44 MG/DL (ref 0–0.3)
BILIRUB DIRECT SERPL-MCNC: 0.61 MG/DL (ref 0–0.3)
BILIRUB SERPL-MCNC: 1.9 MG/DL
BILIRUB SERPL-MCNC: 2 MG/DL
BUN SERPL-MCNC: 12.1 MG/DL (ref 8–23)
CALCIUM SERPL-MCNC: 8.9 MG/DL (ref 8.6–10)
CHLORIDE SERPL-SCNC: 101 MMOL/L (ref 98–107)
CREAT SERPL-MCNC: 0.71 MG/DL (ref 0.51–0.95)
CRP SERPL-MCNC: 5.06 MG/L
CRP SERPL-MCNC: 5.14 MG/L
DEPRECATED HCO3 PLAS-SCNC: 23 MMOL/L (ref 22–29)
EGFRCR SERPLBLD CKD-EPI 2021: >90 ML/MIN/1.73M2
EOSINOPHIL # BLD AUTO: 0.2 10E3/UL (ref 0–0.7)
EOSINOPHIL # BLD AUTO: 0.3 10E3/UL (ref 0–0.7)
EOSINOPHIL NFR BLD AUTO: 5 %
EOSINOPHIL NFR BLD AUTO: 7 %
ERYTHROCYTE [DISTWIDTH] IN BLOOD BY AUTOMATED COUNT: 14 % (ref 10–15)
ERYTHROCYTE [DISTWIDTH] IN BLOOD BY AUTOMATED COUNT: 14.1 % (ref 10–15)
GLUCOSE SERPL-MCNC: 91 MG/DL (ref 70–99)
HCT VFR BLD AUTO: 39 % (ref 35–47)
HCT VFR BLD AUTO: 39.5 % (ref 35–47)
HGB BLD-MCNC: 13.6 G/DL (ref 11.7–15.7)
HGB BLD-MCNC: 13.8 G/DL (ref 11.7–15.7)
IMM GRANULOCYTES # BLD: 0 10E3/UL
IMM GRANULOCYTES # BLD: 0 10E3/UL
IMM GRANULOCYTES NFR BLD: 0 %
IMM GRANULOCYTES NFR BLD: 1 %
INR PPP: 1.17 (ref 0.85–1.15)
LYMPHOCYTES # BLD AUTO: 0.9 10E3/UL (ref 0.8–5.3)
LYMPHOCYTES # BLD AUTO: 1 10E3/UL (ref 0.8–5.3)
LYMPHOCYTES NFR BLD AUTO: 23 %
LYMPHOCYTES NFR BLD AUTO: 25 %
MCH RBC QN AUTO: 34.8 PG (ref 26.5–33)
MCH RBC QN AUTO: 35.8 PG (ref 26.5–33)
MCHC RBC AUTO-ENTMCNC: 34.4 G/DL (ref 31.5–36.5)
MCHC RBC AUTO-ENTMCNC: 35.4 G/DL (ref 31.5–36.5)
MCV RBC AUTO: 101 FL (ref 78–100)
MCV RBC AUTO: 101 FL (ref 78–100)
MONOCYTES # BLD AUTO: 0.4 10E3/UL (ref 0–1.3)
MONOCYTES # BLD AUTO: 0.4 10E3/UL (ref 0–1.3)
MONOCYTES NFR BLD AUTO: 10 %
MONOCYTES NFR BLD AUTO: 9 %
NEUTROPHILS # BLD AUTO: 2.2 10E3/UL (ref 1.6–8.3)
NEUTROPHILS # BLD AUTO: 2.4 10E3/UL (ref 1.6–8.3)
NEUTROPHILS NFR BLD AUTO: 59 %
NEUTROPHILS NFR BLD AUTO: 60 %
NRBC # BLD AUTO: 0 10E3/UL
NRBC # BLD AUTO: 0 10E3/UL
NRBC BLD AUTO-RTO: 0 /100
NRBC BLD AUTO-RTO: 0 /100
PLATELET # BLD AUTO: 113 10E3/UL (ref 150–450)
PLATELET # BLD AUTO: 118 10E3/UL (ref 150–450)
POTASSIUM SERPL-SCNC: 4.4 MMOL/L (ref 3.4–5.3)
PROT SERPL-MCNC: 7.7 G/DL (ref 6.4–8.3)
PROT SERPL-MCNC: 7.7 G/DL (ref 6.4–8.3)
RBC # BLD AUTO: 3.86 10E6/UL (ref 3.8–5.2)
RBC # BLD AUTO: 3.91 10E6/UL (ref 3.8–5.2)
SODIUM SERPL-SCNC: 134 MMOL/L (ref 135–145)
WBC # BLD AUTO: 3.8 10E3/UL (ref 4–11)
WBC # BLD AUTO: 4 10E3/UL (ref 4–11)

## 2024-06-12 PROCEDURE — 82105 ALPHA-FETOPROTEIN SERUM: CPT

## 2024-06-12 PROCEDURE — 36415 COLL VENOUS BLD VENIPUNCTURE: CPT | Performed by: PHYSICIAN ASSISTANT

## 2024-06-12 PROCEDURE — 36415 COLL VENOUS BLD VENIPUNCTURE: CPT | Performed by: INTERNAL MEDICINE

## 2024-06-12 PROCEDURE — 85025 COMPLETE CBC W/AUTO DIFF WBC: CPT

## 2024-06-12 PROCEDURE — 86140 C-REACTIVE PROTEIN: CPT | Performed by: PHYSICIAN ASSISTANT

## 2024-06-12 PROCEDURE — 82248 BILIRUBIN DIRECT: CPT

## 2024-06-12 PROCEDURE — 80053 COMPREHEN METABOLIC PANEL: CPT

## 2024-06-12 PROCEDURE — 85025 COMPLETE CBC W/AUTO DIFF WBC: CPT | Performed by: PHYSICIAN ASSISTANT

## 2024-06-12 PROCEDURE — 74183 MRI ABD W/O CNTR FLWD CNTR: CPT | Mod: MG

## 2024-06-12 PROCEDURE — 258N000003 HC RX IP 258 OP 636: Mod: JZ | Performed by: INTERNAL MEDICINE

## 2024-06-12 PROCEDURE — 80321 ALCOHOLS BIOMARKERS 1OR 2: CPT | Mod: GA

## 2024-06-12 PROCEDURE — 250N000011 HC RX IP 250 OP 636: Mod: JZ | Performed by: INTERNAL MEDICINE

## 2024-06-12 PROCEDURE — 86140 C-REACTIVE PROTEIN: CPT

## 2024-06-12 PROCEDURE — 36415 COLL VENOUS BLD VENIPUNCTURE: CPT

## 2024-06-12 PROCEDURE — A9581 GADOXETATE DISODIUM INJ: HCPCS | Performed by: INTERNAL MEDICINE

## 2024-06-12 PROCEDURE — 255N000002 HC RX 255 OP 636: Performed by: INTERNAL MEDICINE

## 2024-06-12 PROCEDURE — 85610 PROTHROMBIN TIME: CPT

## 2024-06-12 PROCEDURE — 96413 CHEMO IV INFUSION 1 HR: CPT

## 2024-06-12 PROCEDURE — 84155 ASSAY OF PROTEIN SERUM: CPT | Performed by: PHYSICIAN ASSISTANT

## 2024-06-12 RX ORDER — DIPHENHYDRAMINE HYDROCHLORIDE 50 MG/ML
50 INJECTION INTRAMUSCULAR; INTRAVENOUS
Status: DISCONTINUED | OUTPATIENT
Start: 2024-06-12 | End: 2024-06-12

## 2024-06-12 RX ORDER — DIPHENHYDRAMINE HCL 25 MG
25 CAPSULE ORAL ONCE
Status: CANCELLED
Start: 2024-07-24 | End: 2024-07-24

## 2024-06-12 RX ORDER — ALBUTEROL SULFATE 0.83 MG/ML
2.5 SOLUTION RESPIRATORY (INHALATION)
Status: DISCONTINUED | OUTPATIENT
Start: 2024-06-12 | End: 2024-06-12

## 2024-06-12 RX ORDER — EPINEPHRINE 1 MG/ML
0.3 INJECTION, SOLUTION INTRAMUSCULAR; SUBCUTANEOUS EVERY 5 MIN PRN
Status: DISCONTINUED | OUTPATIENT
Start: 2024-06-12 | End: 2024-06-12

## 2024-06-12 RX ORDER — EPINEPHRINE 1 MG/ML
0.3 INJECTION, SOLUTION INTRAMUSCULAR; SUBCUTANEOUS EVERY 5 MIN PRN
Status: CANCELLED | OUTPATIENT
Start: 2024-07-24

## 2024-06-12 RX ORDER — ALBUTEROL SULFATE 90 UG/1
1-2 AEROSOL, METERED RESPIRATORY (INHALATION)
Status: DISCONTINUED | OUTPATIENT
Start: 2024-06-12 | End: 2024-06-12

## 2024-06-12 RX ORDER — ALBUTEROL SULFATE 0.83 MG/ML
2.5 SOLUTION RESPIRATORY (INHALATION)
Status: CANCELLED | OUTPATIENT
Start: 2024-07-24

## 2024-06-12 RX ORDER — ALBUTEROL SULFATE 90 UG/1
1-2 AEROSOL, METERED RESPIRATORY (INHALATION)
Status: CANCELLED
Start: 2024-07-24

## 2024-06-12 RX ORDER — MEPERIDINE HYDROCHLORIDE 25 MG/ML
25 INJECTION INTRAMUSCULAR; INTRAVENOUS; SUBCUTANEOUS EVERY 30 MIN PRN
Status: CANCELLED | OUTPATIENT
Start: 2024-07-24

## 2024-06-12 RX ORDER — ACETAMINOPHEN 325 MG/1
650 TABLET ORAL ONCE
Status: CANCELLED
Start: 2024-07-24 | End: 2024-07-24

## 2024-06-12 RX ORDER — DIPHENHYDRAMINE HYDROCHLORIDE 50 MG/ML
50 INJECTION INTRAMUSCULAR; INTRAVENOUS
Status: CANCELLED
Start: 2024-07-24

## 2024-06-12 RX ORDER — METHYLPREDNISOLONE SODIUM SUCCINATE 125 MG/2ML
125 INJECTION, POWDER, LYOPHILIZED, FOR SOLUTION INTRAMUSCULAR; INTRAVENOUS
Status: DISCONTINUED | OUTPATIENT
Start: 2024-06-12 | End: 2024-06-12

## 2024-06-12 RX ORDER — METHYLPREDNISOLONE SODIUM SUCCINATE 125 MG/2ML
125 INJECTION, POWDER, LYOPHILIZED, FOR SOLUTION INTRAMUSCULAR; INTRAVENOUS
Status: CANCELLED
Start: 2024-07-24

## 2024-06-12 RX ORDER — MEPERIDINE HYDROCHLORIDE 25 MG/ML
25 INJECTION INTRAMUSCULAR; INTRAVENOUS; SUBCUTANEOUS EVERY 30 MIN PRN
Status: DISCONTINUED | OUTPATIENT
Start: 2024-06-12 | End: 2024-06-12

## 2024-06-12 RX ADMIN — GADOXETATE DISODIUM 16 ML: 181.43 INJECTION, SOLUTION INTRAVENOUS at 10:07

## 2024-06-12 RX ADMIN — INFLIXIMAB 800 MG: 100 INJECTION, POWDER, LYOPHILIZED, FOR SOLUTION INTRAVENOUS at 12:00

## 2024-06-12 ASSESSMENT — PAIN SCALES - GENERAL: PAINLEVEL: NO PAIN (0)

## 2024-06-12 NOTE — PROGRESS NOTES
Infusion Nursing Note:  Abiolajeannette Matute presents today for infliximab.    Patient seen by provider today: No   present during visit today: Not Applicable.    Note: N/A.      Intravenous Access:  Peripheral IV placed.    Treatment Conditions:  Biological Infusion Checklist:  ~~~ NOTE: If the patient answers yes to any of the questions below, hold the infusion and contact ordering provider or on-call provider.    Have you recently had an elevated temperature, fever, chills, productive cough, coughing for 3 weeks or longer or hemoptysis,  abnormal vital signs, night sweats,  chest pain or have you noticed a decrease in your appetite, unexplained weight loss or fatigue? No  Do you have any open wounds or new incisions? No  Do you have any upcoming hospitalizations or surgeries? Does not include esophagogastroduodenoscopy, colonoscopy, endoscopic retrograde cholangiopancreatography (ERCP), endoscopic ultrasound (EUS), dental procedures or joint aspiration/steroid injections No  Do you currently have any signs of illness or infection or are you on any antibiotics? No  Have you had any new, sudden or worsening abdominal pain? No  Have you or anyone in your household received a live vaccination in the past 4 weeks? Please note: No live vaccines while on biologic/chemotherapy until 6 months after the last treatment. Patient can receive the flu vaccine (shot only), pneumovax and the Covid vaccine. It is optimal for the patient to get these vaccines mid cycle, but they can be given at any time as long as it is not on the day of the infusion. No  Have you recently been diagnosed with any new nervous system diseases (ie. Multiple sclerosis, Guillain [place], seizures, neurological changes) or cancer diagnosis? Are you on any form of radiation or chemotherapy? No  Are you pregnant or breast feeding or do you have plans of pregnancy in the future? No  Have you been having any signs of worsening depression or suicidal  ideations?  (benlysta only) No  Have there been any other new onset medical symptoms? No  Have you had any new blood clots? (IVIG only) No      Post Infusion Assessment:  Patient tolerated infusion without incident.  Site patent and intact, free from redness, edema or discomfort.  No evidence of extravasations.  Access discontinued per protocol.       Discharge Plan:   Patient and/or family verbalized understanding of discharge instructions and all questions answered.  AVS to patient via [employer].  Patient will return 8 weeks for next appointment.   Patient discharged in stable condition accompanied by: self.  Departure Mode: Ambulatory.      Cori Garcia RN

## 2024-06-14 DIAGNOSIS — C22.0 HCC (HEPATOCELLULAR CARCINOMA) (H): Primary | ICD-10-CM

## 2024-06-19 ENCOUNTER — ORGAN (OUTPATIENT)
Dept: TRANSPLANT | Facility: CLINIC | Age: 59
End: 2024-06-19
Payer: MEDICARE

## 2024-06-20 ENCOUNTER — ANESTHESIA EVENT (OUTPATIENT)
Dept: SURGERY | Facility: CLINIC | Age: 59
DRG: 006 | End: 2024-06-20
Payer: MEDICARE

## 2024-06-20 ENCOUNTER — APPOINTMENT (OUTPATIENT)
Dept: GENERAL RADIOLOGY | Facility: CLINIC | Age: 59
DRG: 006 | End: 2024-06-20
Attending: PHYSICIAN ASSISTANT
Payer: MEDICARE

## 2024-06-20 ENCOUNTER — HOSPITAL ENCOUNTER (INPATIENT)
Facility: CLINIC | Age: 59
LOS: 6 days | Discharge: HOME HEALTH | DRG: 006 | End: 2024-06-28
Attending: TRANSPLANT SURGERY | Admitting: TRANSPLANT SURGERY
Payer: MEDICARE

## 2024-06-20 ENCOUNTER — HOSPITAL ENCOUNTER (INPATIENT)
Facility: CLINIC | Age: 59
Setting detail: SURGERY ADMIT
End: 2024-06-20
Attending: TRANSPLANT SURGERY | Admitting: TRANSPLANT SURGERY
Payer: MEDICARE

## 2024-06-20 ENCOUNTER — APPOINTMENT (OUTPATIENT)
Dept: CT IMAGING | Facility: CLINIC | Age: 59
DRG: 006 | End: 2024-06-20
Attending: PHYSICIAN ASSISTANT
Payer: MEDICARE

## 2024-06-20 DIAGNOSIS — E66.811 CLASS 1 OBESITY WITH SERIOUS COMORBIDITY AND BODY MASS INDEX (BMI) OF 30.0 TO 30.9 IN ADULT, UNSPECIFIED OBESITY TYPE: ICD-10-CM

## 2024-06-20 DIAGNOSIS — K50.113 CROHN'S DISEASE OF LARGE INTESTINE WITH FISTULA (H): ICD-10-CM

## 2024-06-20 DIAGNOSIS — G47.00 INSOMNIA, UNSPECIFIED TYPE: ICD-10-CM

## 2024-06-20 DIAGNOSIS — Z94.4 S/P LIVER TRANSPLANT (H): Primary | ICD-10-CM

## 2024-06-20 DIAGNOSIS — G89.18 ACUTE POST-OPERATIVE PAIN: ICD-10-CM

## 2024-06-20 DIAGNOSIS — E83.42 HYPOMAGNESEMIA: ICD-10-CM

## 2024-06-20 PROBLEM — K74.60 LIVER CIRRHOSIS (H): Status: ACTIVE | Noted: 2024-06-20

## 2024-06-20 LAB
ABO/RH(D): NORMAL
ALBUMIN SERPL BCG-MCNC: 3.7 G/DL (ref 3.5–5.2)
ALBUMIN UR-MCNC: NEGATIVE MG/DL
ALP SERPL-CCNC: 112 U/L (ref 40–150)
ALT SERPL W P-5'-P-CCNC: 37 U/L (ref 0–50)
AMYLASE SERPL-CCNC: 124 U/L (ref 28–100)
ANION GAP SERPL CALCULATED.3IONS-SCNC: 9 MMOL/L (ref 7–15)
ANTIBODY SCREEN: NEGATIVE
APPEARANCE UR: CLEAR
APTT PPP: 37 SECONDS (ref 22–38)
AST SERPL W P-5'-P-CCNC: 71 U/L (ref 0–45)
BACTERIA #/AREA URNS HPF: ABNORMAL /HPF
BASOPHILS # BLD AUTO: 0 10E3/UL (ref 0–0.2)
BASOPHILS NFR BLD AUTO: 1 %
BILIRUB SERPL-MCNC: 1.8 MG/DL
BILIRUB UR QL STRIP: NEGATIVE
BUN SERPL-MCNC: 11 MG/DL (ref 8–23)
CALCIUM SERPL-MCNC: 8.9 MG/DL (ref 8.6–10)
CHLORIDE SERPL-SCNC: 102 MMOL/L (ref 98–107)
COLOR UR AUTO: ABNORMAL
CREAT SERPL-MCNC: 0.77 MG/DL (ref 0.51–0.95)
DEPRECATED HCO3 PLAS-SCNC: 23 MMOL/L (ref 22–29)
EGFRCR SERPLBLD CKD-EPI 2021: 88 ML/MIN/1.73M2
EOSINOPHIL # BLD AUTO: 0.1 10E3/UL (ref 0–0.7)
EOSINOPHIL NFR BLD AUTO: 3 %
ERYTHROCYTE [DISTWIDTH] IN BLOOD BY AUTOMATED COUNT: 14.2 % (ref 10–15)
FIBRINOGEN PPP-MCNC: 266 MG/DL (ref 170–490)
GLUCOSE SERPL-MCNC: 101 MG/DL (ref 70–99)
GLUCOSE UR STRIP-MCNC: NEGATIVE MG/DL
HBV CORE AB SERPL QL IA: NONREACTIVE
HBV SURFACE AB SERPL IA-ACNC: <3.5 M[IU]/ML
HBV SURFACE AB SERPL IA-ACNC: NONREACTIVE M[IU]/ML
HBV SURFACE AG SERPL QL IA: NONREACTIVE
HCT VFR BLD AUTO: 38.3 % (ref 35–47)
HCV AB SERPL QL IA: NONREACTIVE
HGB BLD-MCNC: 13.8 G/DL (ref 11.7–15.7)
HGB UR QL STRIP: NEGATIVE
HIV 1+2 AB+HIV1 P24 AG SERPL QL IA: NONREACTIVE
IMM GRANULOCYTES # BLD: 0 10E3/UL
IMM GRANULOCYTES NFR BLD: 0 %
INR PPP: 1.24 (ref 0.85–1.15)
KETONES UR STRIP-MCNC: NEGATIVE MG/DL
LEUKOCYTE ESTERASE UR QL STRIP: NEGATIVE
LYMPHOCYTES # BLD AUTO: 1 10E3/UL (ref 0.8–5.3)
LYMPHOCYTES NFR BLD AUTO: 20 %
MAGNESIUM SERPL-MCNC: 1.9 MG/DL (ref 1.7–2.3)
MCH RBC QN AUTO: 36 PG (ref 26.5–33)
MCHC RBC AUTO-ENTMCNC: 36 G/DL (ref 31.5–36.5)
MCV RBC AUTO: 100 FL (ref 78–100)
MONOCYTES # BLD AUTO: 0.4 10E3/UL (ref 0–1.3)
MONOCYTES NFR BLD AUTO: 9 %
MUCOUS THREADS #/AREA URNS LPF: PRESENT /LPF
NEUTROPHILS # BLD AUTO: 3.4 10E3/UL (ref 1.6–8.3)
NEUTROPHILS NFR BLD AUTO: 67 %
NITRATE UR QL: NEGATIVE
NRBC # BLD AUTO: 0 10E3/UL
NRBC BLD AUTO-RTO: 0 /100
PH UR STRIP: 7 [PH] (ref 5–7)
PHOSPHATE SERPL-MCNC: 3.2 MG/DL (ref 2.5–4.5)
PLATELET # BLD AUTO: 119 10E3/UL (ref 150–450)
POTASSIUM SERPL-SCNC: 4 MMOL/L (ref 3.4–5.3)
PROT SERPL-MCNC: 7.6 G/DL (ref 6.4–8.3)
RBC # BLD AUTO: 3.83 10E6/UL (ref 3.8–5.2)
RBC URINE: <1 /HPF
SARS-COV-2 RNA RESP QL NAA+PROBE: NEGATIVE
SODIUM SERPL-SCNC: 134 MMOL/L (ref 135–145)
SP GR UR STRIP: 1.01 (ref 1–1.03)
SPECIMEN EXPIRATION DATE: NORMAL
SQUAMOUS EPITHELIAL: 1 /HPF
UROBILINOGEN UR STRIP-MCNC: NORMAL MG/DL
WBC # BLD AUTO: 5 10E3/UL (ref 4–11)
WBC URINE: <1 /HPF

## 2024-06-20 PROCEDURE — 87635 SARS-COV-2 COVID-19 AMP PRB: CPT | Performed by: PHYSICIAN ASSISTANT

## 2024-06-20 PROCEDURE — 83735 ASSAY OF MAGNESIUM: CPT | Performed by: PHYSICIAN ASSISTANT

## 2024-06-20 PROCEDURE — 87340 HEPATITIS B SURFACE AG IA: CPT | Performed by: PHYSICIAN ASSISTANT

## 2024-06-20 PROCEDURE — 81001 URINALYSIS AUTO W/SCOPE: CPT | Performed by: PHYSICIAN ASSISTANT

## 2024-06-20 PROCEDURE — 85384 FIBRINOGEN ACTIVITY: CPT | Performed by: PHYSICIAN ASSISTANT

## 2024-06-20 PROCEDURE — 86923 COMPATIBILITY TEST ELECTRIC: CPT | Performed by: STUDENT IN AN ORGANIZED HEALTH CARE EDUCATION/TRAINING PROGRAM

## 2024-06-20 PROCEDURE — 87516 HEPATITIS B DNA AMP PROBE: CPT | Performed by: PHYSICIAN ASSISTANT

## 2024-06-20 PROCEDURE — 87389 HIV-1 AG W/HIV-1&-2 AB AG IA: CPT | Performed by: PHYSICIAN ASSISTANT

## 2024-06-20 PROCEDURE — 85610 PROTHROMBIN TIME: CPT | Performed by: PHYSICIAN ASSISTANT

## 2024-06-20 PROCEDURE — 84100 ASSAY OF PHOSPHORUS: CPT | Performed by: PHYSICIAN ASSISTANT

## 2024-06-20 PROCEDURE — 86645 CMV ANTIBODY IGM: CPT | Performed by: PHYSICIAN ASSISTANT

## 2024-06-20 PROCEDURE — 999N000128 HC STATISTIC PERIPHERAL IV START W/O US GUIDANCE

## 2024-06-20 PROCEDURE — 86665 EPSTEIN-BARR CAPSID VCA: CPT | Performed by: PHYSICIAN ASSISTANT

## 2024-06-20 PROCEDURE — 86706 HEP B SURFACE ANTIBODY: CPT | Performed by: PHYSICIAN ASSISTANT

## 2024-06-20 PROCEDURE — 86900 BLOOD TYPING SEROLOGIC ABO: CPT | Performed by: PHYSICIAN ASSISTANT

## 2024-06-20 PROCEDURE — 71046 X-RAY EXAM CHEST 2 VIEWS: CPT | Mod: 26 | Performed by: RADIOLOGY

## 2024-06-20 PROCEDURE — 85730 THROMBOPLASTIN TIME PARTIAL: CPT | Performed by: PHYSICIAN ASSISTANT

## 2024-06-20 PROCEDURE — 999N000054 HC STATISTIC EKG NON-CHARGEABLE

## 2024-06-20 PROCEDURE — 86923 COMPATIBILITY TEST ELECTRIC: CPT

## 2024-06-20 PROCEDURE — 87086 URINE CULTURE/COLONY COUNT: CPT | Performed by: PHYSICIAN ASSISTANT

## 2024-06-20 PROCEDURE — 80053 COMPREHEN METABOLIC PANEL: CPT | Performed by: PHYSICIAN ASSISTANT

## 2024-06-20 PROCEDURE — 71250 CT THORAX DX C-: CPT | Mod: 26 | Performed by: RADIOLOGY

## 2024-06-20 PROCEDURE — 82150 ASSAY OF AMYLASE: CPT | Performed by: PHYSICIAN ASSISTANT

## 2024-06-20 PROCEDURE — 85025 COMPLETE CBC W/AUTO DIFF WBC: CPT | Performed by: PHYSICIAN ASSISTANT

## 2024-06-20 PROCEDURE — 71250 CT THORAX DX C-: CPT

## 2024-06-20 PROCEDURE — 71046 X-RAY EXAM CHEST 2 VIEWS: CPT

## 2024-06-20 PROCEDURE — 86644 CMV ANTIBODY: CPT | Performed by: PHYSICIAN ASSISTANT

## 2024-06-20 PROCEDURE — 86704 HEP B CORE ANTIBODY TOTAL: CPT | Performed by: PHYSICIAN ASSISTANT

## 2024-06-20 PROCEDURE — 87081 CULTURE SCREEN ONLY: CPT | Performed by: PHYSICIAN ASSISTANT

## 2024-06-20 PROCEDURE — 36415 COLL VENOUS BLD VENIPUNCTURE: CPT | Performed by: PHYSICIAN ASSISTANT

## 2024-06-20 PROCEDURE — 86803 HEPATITIS C AB TEST: CPT | Performed by: PHYSICIAN ASSISTANT

## 2024-06-20 PROCEDURE — 99207 PR PREOP VISIT IN GLOBAL PKG: CPT | Performed by: TRANSPLANT SURGERY

## 2024-06-20 RX ORDER — FLUCONAZOLE 2 MG/ML
400 INJECTION, SOLUTION INTRAVENOUS ONCE
Status: COMPLETED | OUTPATIENT
Start: 2024-06-20 | End: 2024-06-22

## 2024-06-20 RX ORDER — ACETAMINOPHEN 325 MG/1
975 TABLET ORAL ONCE
Status: DISCONTINUED | OUTPATIENT
Start: 2024-06-20 | End: 2024-06-22

## 2024-06-20 RX ORDER — LIDOCAINE 40 MG/G
CREAM TOPICAL
Status: DISCONTINUED | OUTPATIENT
Start: 2024-06-20 | End: 2024-06-24

## 2024-06-20 RX ORDER — PIPERACILLIN SODIUM, TAZOBACTAM SODIUM 3; .375 G/15ML; G/15ML
3.38 INJECTION, POWDER, LYOPHILIZED, FOR SOLUTION INTRAVENOUS
Status: DISCONTINUED | OUTPATIENT
Start: 2024-06-20 | End: 2024-06-23

## 2024-06-20 RX ORDER — PIPERACILLIN SODIUM, TAZOBACTAM SODIUM 3; .375 G/15ML; G/15ML
3.38 INJECTION, POWDER, LYOPHILIZED, FOR SOLUTION INTRAVENOUS ONCE
Status: COMPLETED | OUTPATIENT
Start: 2024-06-20 | End: 2024-06-22

## 2024-06-20 ASSESSMENT — ACTIVITIES OF DAILY LIVING (ADL)
ADLS_ACUITY_SCORE: 22
ADLS_ACUITY_SCORE: 35
ADLS_ACUITY_SCORE: 22
ADLS_ACUITY_SCORE: 22

## 2024-06-20 NOTE — PLAN OF CARE
/82 (BP Location: Left arm, Patient Position: Sitting)   Pulse 82   Temp 97.9  F (36.6  C) (Oral)   Resp 16   LMP 05/31/2006   SpO2 97%     Pt arrived to unit~1600   Isolation Status: none  VS: VSS on RA, afebrile  Neuro: Aox4  Behaviors: calm, nervous, cooperative  BG: none  Labs/Imaging: pre-op labs done, VRE/COVID swab sent, XRAY done, needs urine sample sent and CT scan done  Respiratory: WDL  Cardiac: WDL  Pain/Nausea: denies  Diet: Reg, NPO at MN  IV Access: PIVx1   GI/: LBM this AM, voids   Skin: left ankle rash present on admission, pt reports exczema  Mobility: independent  Plan: OR time tomorrow at 10A

## 2024-06-20 NOTE — TELEPHONE ENCOUNTER
There are three types of donors - living donors, brain dead donors, and DCD donors.  Living donation would be like if your family member donated an organ to you.  Brain death donors are  donors who have no brain stem reflexes whose bodies are being kept alive with life support.  And then there is your type of offer - DCD donation.  DCD stands for donation after cardiac death.  It is when the potential donor had a brain injury but did not progress to brain death, so the family is choosing to withdraw life support.    They will take away life support and wait for the heart to stop. This must happen within a certain timeframe to be suitable for organ donation.  There is a chance the donor may not pass away in time, which means you may not get the liver.  We have no way to predict how quickly the donor will pass.  Research has shown that DCD livers have a higher risk of biliary complications - strictures in particular.  About 20% of recipients who receive a DCD liver transplant develop biliary strictures, meaning the need for ERCP and stents, and worst case scenario if the strictures become so severe, retransplant may be indicated.  In our interest to give you the best organ possible, our surgeons may decline the liver intra-operatively if it looks like the risk of complications will be too significant.  (ERCP is similar to when you have an EGD.  They take a scope and look down inside of you & can place a stent in the bile ducts if needed.)

## 2024-06-20 NOTE — TELEPHONE ENCOUNTER
"TRANSPLANT OR REPORT    Organ: liver  Laterality (if known): n/a  Organ Location: local    UN ID: [id number]   Donor OR Time: 6/21 1000  Expected/Actual Cross Clamp Time: 6/21 1100  Expected Organ Arrival Time: 1700    Surgeon: Quinn  Time in OR: 1700  Time in 3C (N/A for MAGGIE): NA    Recipient Details  Admission ETA: 1600  Unit: 7a  Isolation: none  Latex Allergy: none  : none  Diagnosis: ESLD    Liver Transplants  Bypass/Perfusion: yes  Cellsaver: yes  Hemodialysis: no  ~ \"RENAL STAFF TEACHING SERVICE MEDICINE\" : Remind LI Fellow to discuss with nephrology on call.  ~ CRRT Resource Nurse: n/a  (Telephone Number for CRRT [phone]. When prompted, the caller should say  CRRT Resource 1\")    Liver or KP/PA Recipients - Vessel Banking:  Donor has positive serologies for HIV/HCV/HBV: no  Donor has risk criteria for HIV/HCV/HBV: no      Transplant Coordinator Contact Info: siva      Vessel Bank Information  Transplant hospitals must not store a donor s extra vessels if the donor has tested positive for any of the following:   - HIV by antibody, antigen, or nucleic acid test (RUTH)   - Hepatitis B surface antigen (HBsAg)   - Hepatitis B (HBV) by RUTH   - Hepatitis C (HCV) by antibody or RUTH     Extra vessels from donors that do not test positive for HIV, HBV, or HCV as above may be stored    "

## 2024-06-20 NOTE — H&P
Physician Attestation   I saw this patient with the resident and agree with the resident/fellow's findings and plan of care as documented in the note.      Key findings: as noted below    Please see A&P for additional details of medical decision making.    I have personally reviewed the following data over the past 24 hrs:    8.1  \   8.6 (L)   / 92 (L)     139 106 11.7 /  273 (H)   3.8 18 (L) 0.62 \     ALT: 1,494 (HH); 1,494 (HH) AST: 5,788 (HH); 5,788 (HH) AP: 83; 83 TBILI: 4.0 (H); 4.0 (H)   ALB: 2.8 (L); 2.8 (L) TOT PROTEIN: 5.0 (L); 5.0 (L) LIPASE: N/A     TSH: N/A T4: N/A A1C: 5.1     Procal: N/A CRP: N/A Lactic Acid: 7.2 (HH)       INR:  1.84 (H) PTT:  45 (H)   D-dimer:  N/A Fibrinogen:  199         Pravin Ball MD  Date of Service (when I saw the patient): 24  [facility] History and Physical    Abiola Matute MRN# [id number]   Age: 58 year old YOB: 1965     Date of Admission:  2024     Primary care provider: Linda Macdonald          Assessment and Plan:   Assessment:   58 year old female with PMH significant for 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 Saavedra, thrombocytopenia (~100s), hyponatremia and ascites. PMH also significant for stable tiny pulmonary nodule (3mm) at right major fissure on last CT 23, osteopenia, LE cellulitis, crohn's disease on infliximab and obesity. Admit for possible DCDOLT on 24 with Dr. Ball.     MELD 3.0: 14 at 2024  4:51 PM  MELD-Na: 11 at 2024  4:51 PM  Calculated from:  Serum Creatinine: 0.77 mg/dL (Using min of 1 mg/dL) at 2024  4:51 PM  Serum Sodium: 134 mmol/L at 2024  4:51 PM  Total Bilirubin: 1.8 mg/dL at 2024  4:51 PM  Serum Albumin: 3.7 g/dL (Using max of 3.5 g/dL) at 2024  4:51 PM  INR(ratio): 1.24 at 2024  4:51 PM  Age at listin years  Sex: Female at 2024  4:51 PM        Plan:   -Admit to observation,  liver transplant surgery with Dr. Bocanegra  -NPO at midnight (6/21)  -Labs, EKG, CXR, COVID-19  -Consent to be completed per surgeon  -Possible DCDOLT on 6/21/24  -CT Chest non con for annual surveillance with h/o pulmonary nodule             Chief Complaint:   No complaints, admit for possible liver transplant.     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 Saavedra, thrombocytopenia (~100s), hyponatremia and ascites. PMH also significant for stable tiny pulmonary nodule (3mm) at right major fissure on last CT 7/7/23, osteopenia, LE cellulitis, crohn's disease on infliximab and obesity.    Patients states she is doing well this afternoon. Feeling a bit anxious after getting the phone call about the organ transplant. Plans to have her family visit this evening. Had two episodes of diarrhea today, which she believes is attributable to her anxiety. Also has a history of Crohn's disease so this is not abnormal for her. Most recently had a dose of infliximab on 6/12. She has also recently been taking a semaglutide injection that is compounded at the pharmacy. Most recent dose was last Saturday.          Past Medical History:     Past Medical History:   Diagnosis Date    Alcohol abuse     Alcoholic cirrhosis of liver with ascites     Anal fistula     Atypical ductal hyperplasia of breast 09/10/2010    ERT not recommended -left - and flat epithelial atypia-scheduled for breast biopsy 9/17/2010     Bifid uvula     Cholelithiasis     Contact perianal dermatitis and other eczema     recurrent - clobetasol     Crohn disease     Fear of flying      - gets Ativan prn.     HCC (hepatocellular carcinoma) (H) 2/1/2023    Hypertension     IBS (irritable bowel syndrome)             Past Surgical History:     Past Surgical History:   Procedure Laterality Date    BIOPSY ANAL N/A 3/28/2019    anal biopsy  and culure placement of seton - Dr Fleming    BIOPSY BREAST Left 09/17/2010    - scheduled with Dr. Varma     BIOPSY LIVER  2019    COLONOSCOPY  2006    COLONOSCOPY N/A 12/23/2014    Procedure: COMBINED COLONOSCOPY, SINGLE OR MULTIPLE BIOPSY/POLYPECTOMY BY BIOPSY;  Surgeon: Diane Fleming MD;  Location:  GI    COLONOSCOPY N/A 12/5/2019    Procedure: COLONOSCOPY, WITH POLYPECTOMY AND BIOPSY;  Surgeon: Farhan Schilling MD;  Location: [facility] GI    COLONOSCOPY N/A 2/27/2024    Procedure: COLONOSCOPY, WITH POLYPECTOMY AND BIOPSY;  Surgeon: Michelle Diaz MD;  Location: [facility] OR    ENDOSCOPIC RETROGRADE CHOLANGIOPANCREATOGRAM N/A 4/24/2020    Procedure: ENDOSCOPIC RETROGRADE CHOLANGIOPANCREATOGRAPHY WITH, sledge removal,sphincterotomy, stent in gallbladder and pancreatic duct stent, and balloon dilation;  Surgeon: Guru Isac Kraft MD;  Location: [facility] OR    ESOPHAGOSCOPY, GASTROSCOPY, DUODENOSCOPY (EGD), COMBINED N/A 12/5/2019    Procedure: ESOPHAGOGASTRODUODENOSCOPY (EGD);  Surgeon: Farhan Schilling MD;  Location: [facility] GI    ESOPHAGOSCOPY, GASTROSCOPY, DUODENOSCOPY (EGD), COMBINED N/A 5/11/2023    Procedure: Esophagoscopy, gastroscopy, duodenoscopy (EGD), combined;  Surgeon: Jeannette Cervantes MD;  Location: [facility] GI    EXAM UNDER ANESTHESIA ANUS N/A 3/28/2019    Procedure: EXAM UNDER ANESTHESIA ANUS;  Surgeon: Diane Fleming MD;  Location:  OR    EXAM UNDER ANESTHESIA ANUS N/A 2/26/2021    Procedure: EXAM UNDER ANESTHESIA OF ANUS, SETON PLACEMENT, EXCISION OF SKIN BRIDGE;  Surgeon: Avtar Nam MD;  [place] [facility] [place]    EXAM UNDER ANESTHESIA ANUS N/A 1/6/2023    Procedure: EXAM UNDER ANESTHESIA, ANUS, SETON EXCHANGE, partial fistulotomy;  Surgeon: Mary Dugan MD;  [place] [facility] [place]    HYSTERECTOMY, VAGINAL  2006    with Dr. Licha Zhou - with BSO for fibroids     IR GALLBLADDER DRAIN PLACEMENT  4/22/2020    IR SIRT (SELECTIVE INTERNAL RADIO THERAPY)   2/21/2023    IR VISCERAL ANGIOGRAM  2/21/2023    IR VISCERAL EMBOLIZATION  2/27/2023    LAPAROSCOPIC ABLATION LIVER TUMOR N/A 8/29/2023    Procedure: Diagnostic Laparoscopy, Laparoscopic microwave ablation of liver tumor intraoperative ultrasound of liver, lysis of adhesions 1 hour,;  Surgeon: Albino Saavedra MD;  Location: [facility] OR    OPEN REDUCTION INTERNAL FIXATION ANKLE Left at age 27    plates and screws removed at age 36    Pelviscopy with removal of bilateral hydrosalpinges.  04/15/2010    ZZC APPENDECTOMY  at age 14            Social History:     Social History     Tobacco Use    Smoking status: Never     Passive exposure: Never    Smokeless tobacco: Never   Substance Use Topics    Alcohol use: Not Currently            Family History:   No family history of bleeding or clotting disorders.         Immunizations:     Immunization History   Administered Date(s) Administered    COVID-19 MONOVALENT 12+ (Pfizer) 04/05/2021, 04/21/2021, 12/22/2021    Influenza (IIV3) PF 11/01/2014    Influenza Vaccine 18-64 (Flublok) 10/03/2019, 09/15/2021, 11/11/2022    Influenza Vaccine >6 months,quad, PF 11/02/2016, 09/04/2020, 02/07/2024    Influenza Vaccine, 6+MO IM (QUADRIVALENT W/PRESERVATIVES) 09/23/2015, 10/05/2017    MMR 04/24/2007    Pneumococcal 20 valent Conjugate (Prevnar 20) 11/11/2022    Pneumococcal 23 valent 09/04/2020    TD,PF 7+ (Tenivac) 09/16/1999    TDAP Vaccine (Adacel) 09/04/2020    TDAP Vaccine (Boostrix) 04/07/2010    Zoster recombinant adjuvanted (SHINGRIX) 09/08/2020, 11/04/2020            Allergies:     Allergies   Allergen Reactions    Fish Oil      Redness and itching around eye area only - went away when fish oil capsules stopped     Metronidazole      pain/itching    Ppd [Tuberculin Purified Protein Derivative]     Sulfa Antibiotics      hives    Terbinafine And Related      Lamisil = mild urticarial reaction            Medications:     Medications Prior to Admission   Medication Sig Dispense  Refill Last Dose    ammonium lactate (AMLACTIN) 12 % external cream Apply topically 2 times daily (Patient taking differently: Apply topically daily as needed) 385 g 3 Past Week at PRN    clobetasol (TEMOVATE) 0.05 % external cream Apply to AA BID x 1-2 weeks then PRN. Do not apply to face. 60 g 3 Past Week at PRN    COMPOUNDED NON-CONTROLLED SUBSTANCE (CMPD RX) - PHARMACY TO MIX COMPOUNDED MEDICATION Compounded Semaglutide 0.5 mg subcutaneous injection once weekly 4 each 2 6/15/2024    furosemide (LASIX) 40 MG tablet Take 1 tablet (40 mg) by mouth daily 90 tablet 3 6/20/2024 at AM    inFLIXimab (REMICADE) 100 MG injection Inject 10 mg/kg into the vein once every eight weeks   6/12/2024    magnesium 250 MG tablet Take 1 tablet by mouth daily as needed (muscle cramps/spasms)   Past Week at PRN    Multiple Vitamins-Minerals (MULTIVITAMIN & MINERAL PO) Take 1 tablet by mouth every morning   6/20/2024 at AM    potassium 99 MG TABS Take 1 tablet by mouth daily as needed (muscle cramps/spasms)   Past Week at PRN    spironolactone (ALDACTONE) 100 MG tablet Take 1 tablet (100 mg) by mouth every morning 90 tablet 3 6/20/2024 at AM    triamcinolone (KENALOG) 0.1 % external cream Apply to AA BID x 1-2 week then PRN only 80 g 3 Past Week at PRN             Review of Systems:   The Review of Systems is negative other than noted in the HPI         Physical Exam (Resident / Clinician):     General: No acute distress, sitting comfortably in bed  HEENT: normocephalic, atraumatic  CV: RRR, no murmurs or rubs  Respiratory: CTAB, no increased work of breathing  Abd: soft, non-distended, non-tender to palpation, well healed surgical scar over RLQ  Extremities: warm and well perfused, no peripheral edema, strength 5/5 b/l upper and lower extremities  Neuro: awake and alert         Data:   Labs, CXR, EKG pending.        Khadijah Tejeda,   General Surgery, PGY-1

## 2024-06-21 ENCOUNTER — DOCUMENTATION ONLY (OUTPATIENT)
Dept: OTHER | Facility: CLINIC | Age: 59
End: 2024-06-21
Payer: MEDICARE

## 2024-06-21 ENCOUNTER — ANESTHESIA (OUTPATIENT)
Dept: SURGERY | Facility: CLINIC | Age: 59
DRG: 006 | End: 2024-06-21
Payer: MEDICARE

## 2024-06-21 ENCOUNTER — DOCUMENTATION ONLY (OUTPATIENT)
Dept: TRANSPLANT | Facility: CLINIC | Age: 59
End: 2024-06-21
Payer: MEDICARE

## 2024-06-21 LAB
ATRIAL RATE - MUSE: 85 BPM
BACTERIA UR CULT: NORMAL
BLD PROD TYP BPU: NORMAL
BLOOD COMPONENT TYPE: NORMAL
CMV IGG SERPL IA-ACNC: <0.2 U/ML
CMV IGG SERPL IA-ACNC: NORMAL
CMV IGM SERPL IA-ACNC: <8 AU/ML
CMV IGM SERPL IA-ACNC: NEGATIVE
CODING SYSTEM: NORMAL
CROSSMATCH: NORMAL
DIASTOLIC BLOOD PRESSURE - MUSE: NORMAL MMHG
EBV VCA IGG SER IA-ACNC: 135 U/ML
EBV VCA IGG SER IA-ACNC: POSITIVE
EBV VCA IGM SER IA-ACNC: 20 U/ML
EBV VCA IGM SER IA-ACNC: NORMAL
INTERPRETATION ECG - MUSE: NORMAL
ISSUE DATE AND TIME: NORMAL
P AXIS - MUSE: 32 DEGREES
PR INTERVAL - MUSE: 174 MS
QRS DURATION - MUSE: 82 MS
QT - MUSE: 380 MS
QTC - MUSE: 452 MS
R AXIS - MUSE: 3 DEGREES
SYSTOLIC BLOOD PRESSURE - MUSE: NORMAL MMHG
T AXIS - MUSE: 23 DEGREES
UNIT ABO/RH: NORMAL
UNIT NUMBER: NORMAL
UNIT STATUS: NORMAL
UNIT TYPE ISBT: 2800
UNIT TYPE ISBT: 600
UNIT TYPE ISBT: 6200
UNIT TYPE ISBT: 8400
VENTRICULAR RATE- MUSE: 85 BPM

## 2024-06-21 PROCEDURE — 999N000128 HC STATISTIC PERIPHERAL IV START W/O US GUIDANCE

## 2024-06-21 RX ORDER — NOREPINEPHRINE BITARTRATE 0.06 MG/ML
.01-.6 INJECTION, SOLUTION INTRAVENOUS CONTINUOUS
Status: CANCELLED | OUTPATIENT
Start: 2024-06-21

## 2024-06-21 RX ORDER — MULTIVITAMIN WITH IRON
1 TABLET ORAL DAILY PRN
Status: ON HOLD | COMMUNITY
End: 2024-06-28

## 2024-06-21 ASSESSMENT — ACTIVITIES OF DAILY LIVING (ADL)
ADLS_ACUITY_SCORE: 22

## 2024-06-21 ASSESSMENT — ENCOUNTER SYMPTOMS: ORTHOPNEA: 0

## 2024-06-21 ASSESSMENT — COPD QUESTIONNAIRES: COPD: 0

## 2024-06-21 ASSESSMENT — LIFESTYLE VARIABLES: TOBACCO_USE: 0

## 2024-06-21 NOTE — PLAN OF CARE
/67 (BP Location: Right arm)   Pulse 72   Temp 98.1  F (36.7  C) (Oral)   Resp 16   Wt 77.3 kg (170 lb 8 oz)   LMP 05/31/2006   SpO2 98%   BMI 28.37 kg/m      Neuro: AAOx4  Cardiac: VSS  Respiratory: RA  GI/:  Abdomen soft, voiding  Diet/appetite: Was NPO all day, regular diet now until 2200 (NPO for OR in AM)  Activity: Up ad martinez  Pain: Denies  Skin: L ankle rash  Lines: R PIV    Plan: OR at 0600

## 2024-06-21 NOTE — PROGRESS NOTES
CLINICAL NUTRITION SERVICES - ASSESSMENT NOTE     Nutrition Prescription    RECOMMENDATIONS FOR MDs/PROVIDERS TO ORDER:  Recommend FT/TF if unable to advance diet within 2-3 days    Malnutrition Status:    Moderate malnutrition in the context of chronic illness    Future/Additional Recommendations:  -- when diet is able to be advanced greater than FLD recommend ensure max protein TID (vanilla/chocolate) and calorie counts  -- if TF warranted recommend TwoCal HN @ 40 mL/hr (960 mL/day) + 1 packet prosource TF 20 to provide 2000 kcals (32 kcal/kg/day), 101 g PRO (1.6 g/kg/day), 672 mL H2O, 210 g CHO and 5 g Fiber daily.  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 At that time her 24 hours diet recall was as follows:  Diet Recall  Breakfast Oatmeal w/ banana or blueberries; less often avocado toast w/ egg; Greek yogurt w/ fruit, granola   Lunch     Dinner Fish 1x/week or chicken/pork/beef with pasta/rice + 1-2 veggies (green beans, asparagus, broccoli)   Snacks Grapefruit x 2-3/day now, raw veggies w/ hummus, occasional popcorn or salty snack (not often)    Beverages Water, new coffee in past few weeks, some apple cider/hot chocolate in Keurig    Dining out Has reduced in frequency to 1x/week      Virginia reports she had been intentional losing weight ~ 15 pounds recently. She was eating healthier options. Lean high protein meats, fruits and vegetables.     CURRENT NUTRITION ORDERS  Diet:  "NPO    LABS  Labs reviewed  (6/20): Na 134 mmol/L (L)    MEDICATIONS  Medications reviewed    ANTHROPOMETRICS  Height: 165.1 cm (5' 5\")  Most Recent Weight: 77.3 kg (170 lb 8 oz)    IBW: 56.8 kg  BMI: Overweight BMI 25-29.9  Weight History:   Wt Readings from Last 10 Encounters:   06/21/24 77.3 kg (170 lb 8 oz)   06/12/24 78.9 kg (174 lb)   04/24/24 84 kg (185 lb 1.6 oz)   04/23/24 82.6 kg (182 lb)   04/17/24 80.7 kg (178 lb)   02/27/24 80.7 kg (178 lb)   02/21/24 81.7 kg (180 lb 3.2 oz)   02/13/24 82.3 kg (181 lb 8 oz)   02/07/24 83.9 kg (185 lb)   02/05/24 84.4 kg (186 lb)   Virginia reports weight loss was intentional (15 pounds)  Dosing Weight: 62 kg (adjBW based on IBW and admission weight)    ASSESSED NUTRITION NEEDS  Estimated Energy Needs: 2424-9523 kcals/day (30 - 35 kcals/kg )  Justification: Increased needs and Post-op SOT course x 8 weeks  Estimated Protein Needs:  grams protein/day (1.5 - 2 grams of pro/kg)  Justification: Increased needs and Post-op SOT course x 8 weeks  Estimated Fluid Needs: 2184-1723 mL/day (25 - 30 mL/kg)   Justification: Maintenance    PHYSICAL FINDINGS  See malnutrition section below.    MALNUTRITION  % Intake: No decreased intake noted  % Weight Loss: intentional weight loss   Subcutaneous Fat Loss: Facial region:  mild  Muscle Loss: Temporal:  moderate, Facial & jaw region:  mild, and Thoracic region (clavicle, acromium bone, deltoid, trapezius, pectoral):  mild  Fluid Accumulation/Edema: None noted  Malnutrition Diagnosis: Moderate malnutrition in the context of chronic illness    NUTRITION DIAGNOSIS  Increased nutrient needs calories/protein related to SOT course x 8 weeks as evidenced by increased estimated nutritional needs for healing       INTERVENTIONS  Implementation  Nutrition Education: Provided education on RD role in nutrition POC, nutritional supplements      Goals  Diet adv v nutrition support within 2-3 days.     Monitoring/Evaluation  Progress toward goals " will be monitored and evaluated per protocol.  Candy Hatch MS/RD/LD/CNSC  Available on [employer]   M-F (7am-3:30pm) - 7A/7B Clinical Dietitian  Weekend/Holiday Dietitian (7am-3:30pm)    ** Clinical Dietitians no longer available on pager

## 2024-06-21 NOTE — PLAN OF CARE
/67 (BP Location: Right arm)   Pulse 72   Temp 98.1  F (36.7  C) (Oral)   Resp 16   Wt 77.3 kg (170 lb 8 oz)   LMP 05/31/2006   SpO2 98%   BMI 28.37 kg/m      Shift: 4882-0392  VS: stable on RA, afebrile  Neuro: AOx4  BG: none  Respiratory: WNL  Pain/Nausea/PRN: denies pain/nausea  Diet: NPO  LDA: PIV SL   GI/: voiding adequately; LBM 6/20    Skin: Rash on L ankle   Mobility: UAL   Plan: Possible OR at 5pm  Handoff given to following RN.

## 2024-06-21 NOTE — CONSULTS
"Transplant Admission Psychosocial Assessment    Patient Name: Abiola Matute  : 1965  Age: 58 year old  MRN: [id number]  Date of Initial Social Work Evaluation: 2023    Patient admitted for potential transplant.  Met with pt and spouse bedside to update psychosocial assessment and provide education about SW role while inpatient, and to begin discussion of expectations/requirements, caregiver needs and follow up needs post-transplant.     Presenting Information   Living Situation: Virginia lives in a single family home with her spouse, Albino, and one of their son's Mario (adult).   If not local, plans for short term stay:  n/a  Previous Functional Status: Due to billing/finance concerns at the time, Virginia had been unable to get her infusions which caused her mobility to decline. She indicated that it did fluctuate; some days it was difficult to get off of the couch and others days she was slower with ADLs.   Cultural/Language/Spiritual Considerations: none identified.     Support System  Primary Support Person Spouse, Albino  Other support:  has three adult children who are able to assist.   Plan for support in immediate post-transplant period: Albino will provide primary support.     Health Care Directive  Decision Maker: Pt when able  Alternate Decision Maker: STEVEN Posada  Health Care Directive: Copy in Chart    Mental Health/Coping:   History of Mental Health: Virginia reports that her overall mental health is stable, some days are \"heavier\" than others. Virginia reports that she is coping well.   History of Chemical Health: Virginia shared that she has been sober 540 days (last consumption 22) and is very proud of her sobriety.   Current status: stable, patient reported no changes.  Coping: appropriate   Services Needed/Recommended: none identified at this time    Financial   Income: Virginia receives social security disability.   Impact of transplant on income: no impact identified at this time  Insurance and medication " coverage: Medicare and Social IQ (Social Influence Quotient)  Financial concerns: none identified at this time  Resources needed: none at this time    Education provided by SW: Social Work role inpatient setting, and began discussion of expectations/requirements, caregiver needs and follow up needs post-transplant.    Assessment and recommendations and plan:  Virginia has jimmy abstinent from from alcohol for over a year. She completed the recommended intensive outpatient program through [facility]. She spoke highly of the program and presented with insight into her previous substance use and coping skills. She has had negative PETh's since the last one 1/20/2023.     Virginia has adequate finances and health insurance for transplant and an intact support system. Virginia is an acceptable candidate from a psychosocial perspective.     Varsha SOARES, LGSW  [facility]  Specialty Float   Phone: [phone]

## 2024-06-21 NOTE — PHARMACY-ADMISSION MEDICATION HISTORY
Pharmacy Intern Admission Medication History    Admission medication history is complete. The information provided in this note is only as accurate as the sources available at the time of the update.    Information Source(s): Patient and CareEverywhere/SureScripts via in-person    Pertinent Information:   Patient takes a Potassium and Magnesium supplement PRN for muscle cramps (sometimes substitutes pickle juice)  The patient is using a compounded version of Semaglutide (Ozempic) - took her ~6th dose on Saturday (6/15/24), but is prepared to hold this following her transplant, as necessary  Last Remicade dose 6/12/24    Changes made to PTA medication list:  Added: Potassium and magnesium supplements  Deleted: Compounded Semaglutide 0.25 mg dose (per patient, she is now on the 0.5 mg dose)  Changed: Ammonium lactate cream from daily > PRN per patient    Allergies reviewed with patient and updates made in EHR: yes    Medication History Completed By: Cathryn Funes 6/21/2024 8:53 AM    PTA Med List   Medication Sig Last Dose    ammonium lactate (AMLACTIN) 12 % external cream Apply topically 2 times daily (Patient taking differently: Apply topically daily as needed) Past Week at PRN    clobetasol (TEMOVATE) 0.05 % external cream Apply to AA BID x 1-2 weeks then PRN. Do not apply to face. Past Week at PRN    COMPOUNDED NON-CONTROLLED SUBSTANCE (CMPD RX) - PHARMACY TO MIX COMPOUNDED MEDICATION Compounded Semaglutide 0.5 mg subcutaneous injection once weekly 6/15/2024    furosemide (LASIX) 40 MG tablet Take 1 tablet (40 mg) by mouth daily 6/20/2024 at AM    inFLIXimab (REMICADE) 100 MG injection Inject 10 mg/kg into the vein once every eight weeks 6/12/2024    magnesium 250 MG tablet Take 1 tablet by mouth daily as needed (muscle cramps/spasms) Past Week at PRN    Multiple Vitamins-Minerals (MULTIVITAMIN & MINERAL PO) Take 1 tablet by mouth every morning 6/20/2024 at AM    potassium 99 MG TABS Take 1 tablet by mouth daily as  needed (muscle cramps/spasms) Past Week at PRN    spironolactone (ALDACTONE) 100 MG tablet Take 1 tablet (100 mg) by mouth every morning 6/20/2024 at AM    triamcinolone (KENALOG) 0.1 % external cream Apply to AA BID x 1-2 week then PRN only Past Week at PRN

## 2024-06-21 NOTE — ANESTHESIA PREPROCEDURE EVALUATION
Anesthesia Pre-Procedure Evaluation    Patient: Abiola Matute   MRN: [id number] : 1965        Procedure : Procedure(s):  Transplant liver recipient  donor          Past Medical History:   Diagnosis Date    Alcohol abuse     Alcoholic cirrhosis of liver with ascites     Anal fistula     Atypical ductal hyperplasia of breast 09/10/2010    ERT not recommended -left - and flat epithelial atypia-scheduled for breast biopsy 2010     Bifid uvula     Cholelithiasis     Contact perianal dermatitis and other eczema     recurrent - clobetasol     Crohn disease     Fear of flying      - gets Ativan prn.     HCC (hepatocellular carcinoma) (H) 2023    Hypertension     IBS (irritable bowel syndrome)       Past Surgical History:   Procedure Laterality Date    BIOPSY ANAL N/A 3/28/2019    anal biopsy and culure placement of seton - Dr Fleming    BIOPSY BREAST Left 2010    - scheduled with Dr. Varma     BIOPSY LIVER  2019    COLONOSCOPY  2006    COLONOSCOPY N/A 2014    Procedure: COMBINED COLONOSCOPY, SINGLE OR MULTIPLE BIOPSY/POLYPECTOMY BY BIOPSY;  Surgeon: Diane Fleming MD;  Location:  GI    COLONOSCOPY N/A 2019    Procedure: COLONOSCOPY, WITH POLYPECTOMY AND BIOPSY;  Surgeon: Farhan Schilling MD;  Location:  GI    COLONOSCOPY N/A 2024    Procedure: COLONOSCOPY, WITH POLYPECTOMY AND BIOPSY;  Surgeon: Michelle Diaz MD;  [place] [facility] [place]    ENDOSCOPIC RETROGRADE CHOLANGIOPANCREATOGRAM N/A 2020    Procedure: ENDOSCOPIC RETROGRADE CHOLANGIOPANCREATOGRAPHY WITH, sledge removal,sphincterotomy, stent in gallbladder and pancreatic duct stent, and balloon dilation;  Surgeon: Guru Isac Kraft MD;  Location:  OR    ESOPHAGOSCOPY, GASTROSCOPY, DUODENOSCOPY (EGD), COMBINED N/A 2019    Procedure: ESOPHAGOGASTRODUODENOSCOPY (EGD);  Surgeon: Farhan Schilling MD;  Location:  GI    ESOPHAGOSCOPY, GASTROSCOPY, DUODENOSCOPY (EGD), COMBINED  N/A 5/11/2023    Procedure: Esophagoscopy, gastroscopy, duodenoscopy (EGD), combined;  Surgeon: Jeannette Cervantes MD;  Location: [facility] GI    EXAM UNDER ANESTHESIA ANUS N/A 3/28/2019    Procedure: EXAM UNDER ANESTHESIA ANUS;  Surgeon: Diane Fleming MD;  Location:  OR    EXAM UNDER ANESTHESIA ANUS N/A 2/26/2021    Procedure: EXAM UNDER ANESTHESIA OF ANUS, SETON PLACEMENT, EXCISION OF SKIN BRIDGE;  Surgeon: Avtar Nam MD;  Location: [facility] OR    EXAM UNDER ANESTHESIA ANUS N/A 1/6/2023    Procedure: EXAM UNDER ANESTHESIA, ANUS, SETON EXCHANGE, partial fistulotomy;  Surgeon: Mary Dugan MD;  Location: [facility] OR    HYSTERECTOMY, VAGINAL  2006    with Dr. Licha Zhou - with BSO for fibroids     IR GALLBLADDER DRAIN PLACEMENT  4/22/2020    IR SIRT (SELECTIVE INTERNAL RADIO THERAPY)  2/21/2023    IR VISCERAL ANGIOGRAM  2/21/2023    IR VISCERAL EMBOLIZATION  2/27/2023    LAPAROSCOPIC ABLATION LIVER TUMOR N/A 8/29/2023    Procedure: Diagnostic Laparoscopy, Laparoscopic microwave ablation of liver tumor intraoperative ultrasound of liver, lysis of adhesions 1 hour,;  Surgeon: Albino Saavedra MD;  Location: [facility] OR    OPEN REDUCTION INTERNAL FIXATION ANKLE Left at age 27    plates and screws removed at age 36    Pelviscopy with removal of bilateral hydrosalpinges.  04/15/2010    ZZC APPENDECTOMY  at age 14      Allergies   Allergen Reactions    Fish Oil      Redness and itching around eye area only - went away when fish oil capsules stopped     Metronidazole      pain/itching    Ppd [Tuberculin Purified Protein Derivative]     Sulfa Antibiotics      hives    Terbinafine And Related      Lamisil = mild urticarial reaction      Social History     Tobacco Use    Smoking status: Never     Passive exposure: Never    Smokeless tobacco: Never   Substance Use Topics    Alcohol use: Not Currently      Wt Readings from Last 1 Encounters:   06/21/24 77.3 kg (170 lb 8 oz)        Anesthesia Evaluation   Pt  has had prior anesthetic.     No history of anesthetic complications       ROS/MED HX  ENT/Pulmonary: Comment: stable tiny pulmonary nodule (3mm)    (+)     ERIC risk factors,  hypertension,                              (-) tobacco use, asthma, COPD and recent URI   Neurologic:  - neg neurologic ROS     Cardiovascular:     (+)  hypertension- -   -  - -                                 Previous cardiac testing   Echo: Date: 05 Feb 2024 Results:  Global and regional left ventricular function is normal with an EF of 55-60%.  Right ventricular function, chamber size, wall motion, and thickness are  normal.  Trace aortic insufficiency is present.  Mild ascending Aorta dilatation is present.  No pericardial effusion is present.    Stress Test:  Date: Results:    ECG Reviewed:  Date: 20 June 2024 Results:  Sinus rhythm   Possible Inferior infarct , age undetermined   Abnormal ECG   When compared with ECG of 14-FEB-2023 13:48,   No significant change was found     Cath:  Date: Results:   (-) [place] and orthopnea/PND   METS/Exercise Tolerance: >4 METS Comment: Walks on her treadmill a couple times per week (1-3mph) for 15-20 minutes   Hematologic: Comments: Thrombocytopenia 134      (-) history of blood clots and history of blood transfusion   Musculoskeletal: Comment: Chronic generalized joint pain      GI/Hepatic: Comment: Crohn's has been fairly well controlled with infliximab infusions.    + anal fistula    (+)       Inflammatory bowel disease, bowel prep,  cholecystitis/cholelithiasis,   liver disease,       Renal/Genitourinary:  - neg Renal ROS     Endo:     (+)               Obesity,       Psychiatric/Substance Use:     (+) psychiatric history anxiety (periodic situational anxiety) alcohol abuse      Infectious Disease:  - neg infectious disease ROS  (-) Recent Fever   Malignancy: Comment: HCC (3.2 cm on seg 7) - neg malignancy ROS (+) Malignancy, History of GI.GI CA  Active status post.      Other: Comment: Eczema on  heels     (+)  , H/O Chronic Pain,         Physical Exam    Airway        Mallampati: II   TM distance: > 3 FB   Neck ROM: full   Mouth opening: > 3 cm    Respiratory Devices and Support         Dental       (+) Modest Abnormalities - crowns, retainers, 1 or 2 missing teeth      Cardiovascular   cardiovascular exam normal          Pulmonary   pulmonary exam normal                OUTSIDE LABS:  CBC:   Lab Results   Component Value Date    WBC 5.0 06/20/2024    WBC 4.0 06/12/2024    HGB 13.8 06/20/2024    HGB 13.8 06/12/2024    HCT 38.3 06/20/2024    HCT 39.0 06/12/2024     (L) 06/20/2024     (L) 06/12/2024     BMP:   Lab Results   Component Value Date     (L) 06/20/2024     (L) 06/12/2024    POTASSIUM 4.0 06/20/2024    POTASSIUM 4.4 06/12/2024    CHLORIDE 102 06/20/2024    CHLORIDE 101 06/12/2024    CO2 23 06/20/2024    CO2 23 06/12/2024    BUN 11.0 06/20/2024    BUN 12.1 06/12/2024    CR 0.77 06/20/2024    CR 0.71 06/12/2024     (H) 06/20/2024    GLC 91 06/12/2024     COAGS:   Lab Results   Component Value Date    PTT 37 06/20/2024    INR 1.24 (H) 06/20/2024    FIBR 266 06/20/2024     POC:   Lab Results   Component Value Date     (H) 04/24/2020    HCG Negative 06/07/2006    HCGS Negative 02/14/2023     HEPATIC:   Lab Results   Component Value Date    ALBUMIN 3.7 06/20/2024    PROTTOTAL 7.6 06/20/2024    ALT 37 06/20/2024    AST 71 (H) 06/20/2024     (H) 11/02/2017    ALKPHOS 112 06/20/2024    BILITOTAL 1.8 (H) 06/20/2024     OTHER:   Lab Results   Component Value Date    A1C 4.7 02/14/2023    ARIEL 8.9 06/20/2024    PHOS 3.2 06/20/2024    MAG 1.9 06/20/2024    LIPASE 205 04/25/2020    AMYLASE 124 (H) 06/20/2024    TSH 2.63 02/14/2023    CRP 5.0 09/15/2021    SED 48 (H) 02/21/2024       Anesthesia Plan    ASA Status:  4    NPO Status:  NPO Appropriate    Anesthesia Type: General.     - Airway: ETT   Induction: Intravenous, Propofol, RSI.   Maintenance: Balanced.    Techniques and Equipment:     - Lines/Monitors: 2nd IV, Arterial Line, Central Line, PAC, CVP, BIS, JAYSON            JAYSON Absolute Contra-indication: NONE            JAYSON Relative Contra-indication: Coagulopathy     - Blood: Blood in Room, PRBC, FFP, Cryo, Cell Saver, PLT     - Drips/Meds: Norepi, Vasopressin, Epinephrine     Consents    Anesthesia Plan(s) and associated risks, benefits, and realistic alternatives discussed. Questions answered and patient/representative(s) expressed understanding.     - Discussed:     - Discussed with:  Patient      - Extended Intubation/Ventilatory Support Discussed: Yes.      - Patient is DNR/DNI Status: No     Use of blood products discussed: Yes.     - Discussed with: Patient.     - Consented: consented to blood products     Postoperative Care    Pain management: IV analgesics, Multi-modal analgesia.        Comments:               Johan Hinds MD    I have reviewed the pertinent notes and labs in the chart from the past 30 days and (re)examined the patient.  Any updates or changes from those notes are reflected in this note.     # Hyponatremia: Lowest Na = 134 mmol/L in last 30 days, will monitor as appropriate        # Coagulation Defect: INR = 1.24 (Ref range: 0.85 - 1.15) and/or PTT = 37 Seconds (Ref range: 22 - 38 Seconds), will monitor for bleeding  # Thrombocytopenia: Lowest platelets = 119 in last 2 days, will monitor for bleeding

## 2024-06-21 NOTE — PROGRESS NOTES
[facility] [place] LIVER TUMOR BOARD NOTE  Patient discussed at the multidisciplinary tumor board on 6-. The attendees may consist of representatives from hepatology, oncology, radiation oncology, surgical subspecialty including liver transplant and radiology.     DATE OF TUMOR BOARD: June 21, 2024      SCAN REVIEWED: 6/12/24 MRI, Reviewed by Dr. Rush Frank    Discussion:   -Don't see much change. Area of ill-defineid enhancement, looked exactly like that since 9/2023 shortly after treatment.  -No new lesions anywhere  -Chest CT no suspicious nodules.    Plan:  -Continue with planned transplant

## 2024-06-22 ENCOUNTER — APPOINTMENT (OUTPATIENT)
Dept: GENERAL RADIOLOGY | Facility: CLINIC | Age: 59
DRG: 006 | End: 2024-06-22
Attending: SURGERY
Payer: MEDICARE

## 2024-06-22 ENCOUNTER — DOCUMENTATION ONLY (OUTPATIENT)
Dept: TRANSPLANT | Facility: CLINIC | Age: 59
End: 2024-06-22

## 2024-06-22 ENCOUNTER — APPOINTMENT (OUTPATIENT)
Dept: ULTRASOUND IMAGING | Facility: CLINIC | Age: 59
DRG: 006 | End: 2024-06-22
Attending: SURGERY
Payer: MEDICARE

## 2024-06-22 PROBLEM — Z94.4 S/P LIVER TRANSPLANT (H): Status: ACTIVE | Noted: 2024-06-22

## 2024-06-22 LAB
ABO/RH(D): NORMAL
ALBUMIN SERPL BCG-MCNC: 2.8 G/DL (ref 3.5–5.2)
ALBUMIN SERPL BCG-MCNC: 2.8 G/DL (ref 3.5–5.2)
ALLEN'S TEST: ABNORMAL
ALP SERPL-CCNC: 83 U/L (ref 40–150)
ALP SERPL-CCNC: 83 U/L (ref 40–150)
ALT SERPL W P-5'-P-CCNC: 1494 U/L (ref 0–50)
ALT SERPL W P-5'-P-CCNC: 1494 U/L (ref 0–50)
ANION GAP SERPL CALCULATED.3IONS-SCNC: 15 MMOL/L (ref 7–15)
ANION GAP SERPL CALCULATED.3IONS-SCNC: 18 MMOL/L (ref 7–15)
ANION GAP SERPL CALCULATED.3IONS-SCNC: 18 MMOL/L (ref 7–15)
ANTIBODY SCREEN: NEGATIVE
APTT PPP: 45 SECONDS (ref 22–38)
APTT PPP: 72 SECONDS (ref 22–38)
AST SERPL W P-5'-P-CCNC: 5788 U/L (ref 0–45)
AST SERPL W P-5'-P-CCNC: 5788 U/L (ref 0–45)
BACTERIA SPEC CULT: NORMAL
BACTERIA SPEC CULT: NORMAL
BASE EXCESS BLDA CALC-SCNC: -2.3 MMOL/L (ref -3–3)
BASE EXCESS BLDA CALC-SCNC: -7.6 MMOL/L (ref -3–3)
BASOPHILS # BLD AUTO: 0 10E3/UL (ref 0–0.2)
BASOPHILS # BLD AUTO: 0 10E3/UL (ref 0–0.2)
BASOPHILS NFR BLD AUTO: 0 %
BASOPHILS NFR BLD AUTO: 0 %
BILIRUB DIRECT SERPL-MCNC: 1.53 MG/DL (ref 0–0.3)
BILIRUB SERPL-MCNC: 4 MG/DL
BILIRUB SERPL-MCNC: 4 MG/DL
BLD PROD TYP BPU: NORMAL
BLOOD COMPONENT TYPE: NORMAL
BUN SERPL-MCNC: 11.7 MG/DL (ref 8–23)
BUN SERPL-MCNC: 9.2 MG/DL (ref 8–23)
BUN SERPL-MCNC: 9.2 MG/DL (ref 8–23)
CA-I BLD-MCNC: 4.2 MG/DL (ref 4.4–5.2)
CA-I BLD-MCNC: 5.1 MG/DL (ref 4.4–5.2)
CALCIUM SERPL-MCNC: 9 MG/DL (ref 8.6–10)
CALCIUM SERPL-MCNC: 9.3 MG/DL (ref 8.6–10)
CALCIUM SERPL-MCNC: 9.3 MG/DL (ref 8.6–10)
CHLORIDE SERPL-SCNC: 106 MMOL/L (ref 98–107)
CHLORIDE SERPL-SCNC: 108 MMOL/L (ref 98–107)
CHLORIDE SERPL-SCNC: 108 MMOL/L (ref 98–107)
CLOT INIT KAOL IND TO POST HEP NEUT TRTO: 1 {RATIO}
CLOT INIT KAOL IND TO POST HEP NEUT TRTO: 1.1 {RATIO}
CLOT INIT KAOLIN IND BLD US: 127 SEC (ref 113–166)
CLOT INIT KAOLIN IND BLD US: 176 SEC (ref 113–166)
CLOT INIT KAOLIN IND P HEP NEUT BLD US: 128 SEC (ref 103–153)
CLOT INIT KAOLIN IND P HEP NEUT BLD US: 165 SEC (ref 103–153)
CLOT STIFF PLT CONT BLD CALC: 13.3 HPA (ref 11.9–29.8)
CLOT STIFF PLT CONT BLD CALC: 15.1 HPA (ref 11.9–29.8)
CLOT STIFF TF IND P HEP NEUT BLD US: 15.2 HPA (ref 13–33.2)
CLOT STIFF TF IND P HEP NEUT BLD US: 17 HPA (ref 13–33.2)
CLOT STIFF TF IND+IIB-IIIA INH P HEP NEU: 1.9 HPA (ref 1–3.7)
CLOT STIFF TF IND+IIB-IIIA INH P HEP NEU: 1.9 HPA (ref 1–3.7)
CODING SYSTEM: NORMAL
COHGB MFR BLD: 98 % (ref 96–97)
CREAT SERPL-MCNC: 0.53 MG/DL (ref 0.51–0.95)
CREAT SERPL-MCNC: 0.53 MG/DL (ref 0.51–0.95)
CREAT SERPL-MCNC: 0.62 MG/DL (ref 0.51–0.95)
CROSSMATCH: NORMAL
DEPRECATED HCO3 PLAS-SCNC: 17 MMOL/L (ref 22–29)
DEPRECATED HCO3 PLAS-SCNC: 17 MMOL/L (ref 22–29)
DEPRECATED HCO3 PLAS-SCNC: 18 MMOL/L (ref 22–29)
EGFRCR SERPLBLD CKD-EPI 2021: >90 ML/MIN/1.73M2
EOSINOPHIL # BLD AUTO: 0 10E3/UL (ref 0–0.7)
EOSINOPHIL # BLD AUTO: 0 10E3/UL (ref 0–0.7)
EOSINOPHIL NFR BLD AUTO: 0 %
EOSINOPHIL NFR BLD AUTO: 0 %
ERYTHROCYTE [DISTWIDTH] IN BLOOD BY AUTOMATED COUNT: 18.3 % (ref 10–15)
ERYTHROCYTE [DISTWIDTH] IN BLOOD BY AUTOMATED COUNT: 18.3 % (ref 10–15)
ERYTHROCYTE [DISTWIDTH] IN BLOOD BY AUTOMATED COUNT: 19 % (ref 10–15)
FIBRINOGEN PPP-MCNC: 173 MG/DL (ref 170–490)
FIBRINOGEN PPP-MCNC: 176 MG/DL (ref 170–490)
FIBRINOGEN PPP-MCNC: 195 MG/DL (ref 170–490)
FIBRINOGEN PPP-MCNC: 199 MG/DL (ref 170–490)
GLUCOSE BLD-MCNC: 120 MG/DL (ref 70–99)
GLUCOSE BLDC GLUCOMTR-MCNC: 219 MG/DL (ref 70–99)
GLUCOSE BLDC GLUCOMTR-MCNC: 255 MG/DL (ref 70–99)
GLUCOSE BLDC GLUCOMTR-MCNC: 258 MG/DL (ref 70–99)
GLUCOSE BLDC GLUCOMTR-MCNC: 271 MG/DL (ref 70–99)
GLUCOSE BLDC GLUCOMTR-MCNC: 273 MG/DL (ref 70–99)
GLUCOSE BLDC GLUCOMTR-MCNC: 278 MG/DL (ref 70–99)
GLUCOSE BLDC GLUCOMTR-MCNC: 288 MG/DL (ref 70–99)
GLUCOSE BLDC GLUCOMTR-MCNC: 290 MG/DL (ref 70–99)
GLUCOSE BLDC GLUCOMTR-MCNC: 315 MG/DL (ref 70–99)
GLUCOSE SERPL-MCNC: 213 MG/DL (ref 70–99)
GLUCOSE SERPL-MCNC: 213 MG/DL (ref 70–99)
GLUCOSE SERPL-MCNC: 300 MG/DL (ref 70–99)
GRAM STAIN RESULT: NORMAL
GRAM STAIN RESULT: NORMAL
HBA1C MFR BLD: 5.1 %
HCO3 BLD-SCNC: 19 MMOL/L (ref 21–28)
HCO3 BLDA-SCNC: 22 MMOL/L (ref 21–28)
HCT VFR BLD AUTO: 24.4 % (ref 35–47)
HCT VFR BLD AUTO: 25.6 % (ref 35–47)
HCT VFR BLD AUTO: 25.6 % (ref 35–47)
HGB BLD-MCNC: 14 G/DL (ref 11.7–15.7)
HGB BLD-MCNC: 7.6 G/DL (ref 11.7–15.7)
HGB BLD-MCNC: 8.6 G/DL (ref 11.7–15.7)
HGB BLD-MCNC: 8.7 G/DL (ref 11.7–15.7)
HGB BLD-MCNC: 8.7 G/DL (ref 11.7–15.7)
IMM GRANULOCYTES # BLD: 0.1 10E3/UL
IMM GRANULOCYTES # BLD: 0.1 10E3/UL
IMM GRANULOCYTES NFR BLD: 1 %
IMM GRANULOCYTES NFR BLD: 1 %
INR PPP: 1.84 (ref 0.85–1.15)
INR PPP: 2.17 (ref 0.85–1.15)
INR PPP: 3.43 (ref 0.85–1.15)
ISSUE DATE AND TIME: NORMAL
LACTATE BLD-SCNC: 1 MMOL/L (ref 0.7–2)
LACTATE SERPL-SCNC: 3.4 MMOL/L (ref 0.7–2)
LACTATE SERPL-SCNC: 6.1 MMOL/L (ref 0.7–2)
LACTATE SERPL-SCNC: 7.2 MMOL/L (ref 0.7–2)
LACTATE SERPL-SCNC: 7.5 MMOL/L (ref 0.7–2)
LYMPHOCYTES # BLD AUTO: 0.2 10E3/UL (ref 0.8–5.3)
LYMPHOCYTES # BLD AUTO: 0.8 10E3/UL (ref 0.8–5.3)
LYMPHOCYTES NFR BLD AUTO: 2 %
LYMPHOCYTES NFR BLD AUTO: 9 %
MAGNESIUM SERPL-MCNC: 1.8 MG/DL (ref 1.7–2.3)
MCH RBC QN AUTO: 32.3 PG (ref 26.5–33)
MCH RBC QN AUTO: 32.3 PG (ref 26.5–33)
MCH RBC QN AUTO: 32.7 PG (ref 26.5–33)
MCHC RBC AUTO-ENTMCNC: 34 G/DL (ref 31.5–36.5)
MCHC RBC AUTO-ENTMCNC: 34 G/DL (ref 31.5–36.5)
MCHC RBC AUTO-ENTMCNC: 35.2 G/DL (ref 31.5–36.5)
MCV RBC AUTO: 93 FL (ref 78–100)
MCV RBC AUTO: 95 FL (ref 78–100)
MCV RBC AUTO: 95 FL (ref 78–100)
MONOCYTES # BLD AUTO: 0.1 10E3/UL (ref 0–1.3)
MONOCYTES # BLD AUTO: 0.4 10E3/UL (ref 0–1.3)
MONOCYTES NFR BLD AUTO: 1 %
MONOCYTES NFR BLD AUTO: 5 %
MRSA DNA SPEC QL NAA+PROBE: NEGATIVE
NEUTROPHILS # BLD AUTO: 7 10E3/UL (ref 1.6–8.3)
NEUTROPHILS # BLD AUTO: 7.7 10E3/UL (ref 1.6–8.3)
NEUTROPHILS NFR BLD AUTO: 85 %
NEUTROPHILS NFR BLD AUTO: 96 %
NRBC # BLD AUTO: 0 10E3/UL
NRBC # BLD AUTO: 0 10E3/UL
NRBC BLD AUTO-RTO: 0 /100
NRBC BLD AUTO-RTO: 0 /100
O2/TOTAL GAS SETTING VFR VENT: 40 %
O2/TOTAL GAS SETTING VFR VENT: 40 %
OXYHGB MFR BLDA: 97 % (ref 92–100)
PCO2 BLD: 39 MM HG (ref 35–45)
PCO2 BLDA: 35 MM HG (ref 35–45)
PH BLD: 7.28 [PH] (ref 7.35–7.45)
PH BLDA: 7.4 [PH] (ref 7.35–7.45)
PHOSPHATE SERPL-MCNC: 4.3 MG/DL (ref 2.5–4.5)
PLATELET # BLD AUTO: 91 10E3/UL (ref 150–450)
PLATELET # BLD AUTO: 92 10E3/UL (ref 150–450)
PLATELET # BLD AUTO: 98 10E3/UL (ref 150–450)
PLATELET # BLD AUTO: 98 10E3/UL (ref 150–450)
PO2 BLD: 96 MM HG (ref 80–105)
PO2 BLDA: 133 MM HG (ref 80–105)
POTASSIUM BLD-SCNC: 4.1 MMOL/L (ref 3.4–5.3)
POTASSIUM SERPL-SCNC: 3.4 MMOL/L (ref 3.4–5.3)
POTASSIUM SERPL-SCNC: 3.4 MMOL/L (ref 3.4–5.3)
POTASSIUM SERPL-SCNC: 3.8 MMOL/L (ref 3.4–5.3)
POTASSIUM SERPL-SCNC: 4.2 MMOL/L (ref 3.4–5.3)
PROT SERPL-MCNC: 5 G/DL (ref 6.4–8.3)
PROT SERPL-MCNC: 5 G/DL (ref 6.4–8.3)
RBC # BLD AUTO: 2.63 10E6/UL (ref 3.8–5.2)
RBC # BLD AUTO: 2.69 10E6/UL (ref 3.8–5.2)
RBC # BLD AUTO: 2.69 10E6/UL (ref 3.8–5.2)
SA TARGET DNA: POSITIVE
SAO2 % BLDA: 95 % (ref 92–100)
SAO2 % BLDA: 99 % (ref 96–97)
SODIUM BLD-SCNC: 136 MMOL/L (ref 135–145)
SODIUM SERPL-SCNC: 139 MMOL/L (ref 135–145)
SODIUM SERPL-SCNC: 143 MMOL/L (ref 135–145)
SODIUM SERPL-SCNC: 143 MMOL/L (ref 135–145)
SPECIMEN EXPIRATION DATE: NORMAL
UNIT ABO/RH: NORMAL
UNIT NUMBER: NORMAL
UNIT STATUS: NORMAL
UNIT TYPE ISBT: 600
UNIT TYPE ISBT: 6200
WBC # BLD AUTO: 8.1 10E3/UL (ref 4–11)
WBC # BLD AUTO: 8.2 10E3/UL (ref 4–11)
WBC # BLD AUTO: 8.2 10E3/UL (ref 4–11)

## 2024-06-22 PROCEDURE — 84100 ASSAY OF PHOSPHORUS: CPT | Performed by: STUDENT IN AN ORGANIZED HEALTH CARE EDUCATION/TRAINING PROGRAM

## 2024-06-22 PROCEDURE — 82805 BLOOD GASES W/O2 SATURATION: CPT | Performed by: STUDENT IN AN ORGANIZED HEALTH CARE EDUCATION/TRAINING PROGRAM

## 2024-06-22 PROCEDURE — 85610 PROTHROMBIN TIME: CPT | Performed by: SURGERY

## 2024-06-22 PROCEDURE — 88307 TISSUE EXAM BY PATHOLOGIST: CPT | Mod: TC | Performed by: TRANSPLANT SURGERY

## 2024-06-22 PROCEDURE — 93975 VASCULAR STUDY: CPT

## 2024-06-22 PROCEDURE — 250N000011 HC RX IP 250 OP 636: Mod: JZ

## 2024-06-22 PROCEDURE — 250N000013 HC RX MED GY IP 250 OP 250 PS 637: Performed by: TRANSPLANT SURGERY

## 2024-06-22 PROCEDURE — 272N000001 HC OR GENERAL SUPPLY STERILE: Performed by: TRANSPLANT SURGERY

## 2024-06-22 PROCEDURE — 250N000011 HC RX IP 250 OP 636

## 2024-06-22 PROCEDURE — 250N000009 HC RX 250: Performed by: PHYSICIAN ASSISTANT

## 2024-06-22 PROCEDURE — 82330 ASSAY OF CALCIUM: CPT | Performed by: STUDENT IN AN ORGANIZED HEALTH CARE EDUCATION/TRAINING PROGRAM

## 2024-06-22 PROCEDURE — 250N000009 HC RX 250

## 2024-06-22 PROCEDURE — 250N000011 HC RX IP 250 OP 636: Mod: JZ | Performed by: TRANSPLANT SURGERY

## 2024-06-22 PROCEDURE — 93975 VASCULAR STUDY: CPT | Mod: 26 | Performed by: RADIOLOGY

## 2024-06-22 PROCEDURE — 83735 ASSAY OF MAGNESIUM: CPT | Performed by: STUDENT IN AN ORGANIZED HEALTH CARE EDUCATION/TRAINING PROGRAM

## 2024-06-22 PROCEDURE — 47135 TRANSPLANTATION OF LIVER: CPT | Performed by: TRANSPLANT SURGERY

## 2024-06-22 PROCEDURE — 85049 AUTOMATED PLATELET COUNT: CPT | Performed by: STUDENT IN AN ORGANIZED HEALTH CARE EDUCATION/TRAINING PROGRAM

## 2024-06-22 PROCEDURE — 258N000003 HC RX IP 258 OP 636: Mod: JZ | Performed by: PHYSICIAN ASSISTANT

## 2024-06-22 PROCEDURE — 87070 CULTURE OTHR SPECIMN AEROBIC: CPT | Performed by: TRANSPLANT SURGERY

## 2024-06-22 PROCEDURE — 85384 FIBRINOGEN ACTIVITY: CPT | Performed by: SURGERY

## 2024-06-22 PROCEDURE — 360N000078 HC SURGERY LEVEL 5, PER MIN: Performed by: TRANSPLANT SURGERY

## 2024-06-22 PROCEDURE — 83605 ASSAY OF LACTIC ACID: CPT | Performed by: PHYSICIAN ASSISTANT

## 2024-06-22 PROCEDURE — 80048 BASIC METABOLIC PNL TOTAL CA: CPT | Performed by: SURGERY

## 2024-06-22 PROCEDURE — 250N000009 HC RX 250: Performed by: TRANSPLANT SURGERY

## 2024-06-22 PROCEDURE — C2617 STENT, NON-COR, TEM W/O DEL: HCPCS | Performed by: TRANSPLANT SURGERY

## 2024-06-22 PROCEDURE — 250N000025 HC SEVOFLURANE, PER MIN: Performed by: TRANSPLANT SURGERY

## 2024-06-22 PROCEDURE — 88307 TISSUE EXAM BY PATHOLOGIST: CPT | Mod: 26 | Performed by: PATHOLOGY

## 2024-06-22 PROCEDURE — 85384 FIBRINOGEN ACTIVITY: CPT

## 2024-06-22 PROCEDURE — 250N000011 HC RX IP 250 OP 636: Mod: JZ | Performed by: PHYSICIAN ASSISTANT

## 2024-06-22 PROCEDURE — 85730 THROMBOPLASTIN TIME PARTIAL: CPT

## 2024-06-22 PROCEDURE — 84132 ASSAY OF SERUM POTASSIUM: CPT | Performed by: TRANSPLANT SURGERY

## 2024-06-22 PROCEDURE — 258N000003 HC RX IP 258 OP 636

## 2024-06-22 PROCEDURE — 88309 TISSUE EXAM BY PATHOLOGIST: CPT | Mod: 26 | Performed by: PATHOLOGY

## 2024-06-22 PROCEDURE — 258N000003 HC RX IP 258 OP 636: Mod: JZ

## 2024-06-22 PROCEDURE — 272N000086 HC PACK RI-RAPID INFUSION: Performed by: TRANSPLANT SURGERY

## 2024-06-22 PROCEDURE — 83605 ASSAY OF LACTIC ACID: CPT | Performed by: SURGERY

## 2024-06-22 PROCEDURE — 0FY00Z0 TRANSPLANTATION OF LIVER, ALLOGENEIC, OPEN APPROACH: ICD-10-PCS | Performed by: TRANSPLANT SURGERY

## 2024-06-22 PROCEDURE — 83036 HEMOGLOBIN GLYCOSYLATED A1C: CPT | Performed by: PHYSICIAN ASSISTANT

## 2024-06-22 PROCEDURE — 88313 SPECIAL STAINS GROUP 2: CPT | Mod: 26 | Performed by: PATHOLOGY

## 2024-06-22 PROCEDURE — S5010 5% DEXTROSE AND 0.45% SALINE: HCPCS | Mod: JZ | Performed by: SURGERY

## 2024-06-22 PROCEDURE — 85384 FIBRINOGEN ACTIVITY: CPT | Performed by: STUDENT IN AN ORGANIZED HEALTH CARE EDUCATION/TRAINING PROGRAM

## 2024-06-22 PROCEDURE — 410N000004: Performed by: TRANSPLANT SURGERY

## 2024-06-22 PROCEDURE — 250N000012 HC RX MED GY IP 250 OP 636 PS 637: Performed by: SURGERY

## 2024-06-22 PROCEDURE — 86900 BLOOD TYPING SEROLOGIC ABO: CPT | Performed by: STUDENT IN AN ORGANIZED HEALTH CARE EDUCATION/TRAINING PROGRAM

## 2024-06-22 PROCEDURE — 258N000003 HC RX IP 258 OP 636: Mod: JZ | Performed by: SURGERY

## 2024-06-22 PROCEDURE — 85396 CLOTTING ASSAY WHOLE BLOOD: CPT

## 2024-06-22 PROCEDURE — 82330 ASSAY OF CALCIUM: CPT

## 2024-06-22 PROCEDURE — 85049 AUTOMATED PLATELET COUNT: CPT

## 2024-06-22 PROCEDURE — 272N000085 HC PACK CELL SAVER CSP: Performed by: TRANSPLANT SURGERY

## 2024-06-22 PROCEDURE — 410N000003 HC PER-PERFUSION 1ST 30 MIN: Performed by: TRANSPLANT SURGERY

## 2024-06-22 PROCEDURE — 82247 BILIRUBIN TOTAL: CPT | Performed by: STUDENT IN AN ORGANIZED HEALTH CARE EDUCATION/TRAINING PROGRAM

## 2024-06-22 PROCEDURE — 200N000002 HC R&B ICU UMMC

## 2024-06-22 PROCEDURE — 87102 FUNGUS ISOLATION CULTURE: CPT | Performed by: TRANSPLANT SURGERY

## 2024-06-22 PROCEDURE — P9045 ALBUMIN (HUMAN), 5%, 250 ML: HCPCS | Mod: JZ | Performed by: STUDENT IN AN ORGANIZED HEALTH CARE EDUCATION/TRAINING PROGRAM

## 2024-06-22 PROCEDURE — 88304 TISSUE EXAM BY PATHOLOGIST: CPT | Mod: 26 | Performed by: PATHOLOGY

## 2024-06-22 PROCEDURE — 258N000003 HC RX IP 258 OP 636: Mod: JZ | Performed by: TRANSPLANT SURGERY

## 2024-06-22 PROCEDURE — 250N000011 HC RX IP 250 OP 636: Mod: JZ | Performed by: STUDENT IN AN ORGANIZED HEALTH CARE EDUCATION/TRAINING PROGRAM

## 2024-06-22 PROCEDURE — 85610 PROTHROMBIN TIME: CPT

## 2024-06-22 PROCEDURE — 85018 HEMOGLOBIN: CPT | Performed by: SURGERY

## 2024-06-22 PROCEDURE — 87205 SMEAR GRAM STAIN: CPT | Performed by: TRANSPLANT SURGERY

## 2024-06-22 PROCEDURE — 999N000065 XR CHEST PORT 1 VIEW

## 2024-06-22 PROCEDURE — P9045 ALBUMIN (HUMAN), 5%, 250 ML: HCPCS | Mod: JZ

## 2024-06-22 PROCEDURE — P9037 PLATE PHERES LEUKOREDU IRRAD: HCPCS

## 2024-06-22 PROCEDURE — 250N000013 HC RX MED GY IP 250 OP 250 PS 637: Performed by: PHYSICIAN ASSISTANT

## 2024-06-22 PROCEDURE — 370N000017 HC ANESTHESIA TECHNICAL FEE, PER MIN: Performed by: TRANSPLANT SURGERY

## 2024-06-22 PROCEDURE — 47135 TRANSPLANTATION OF LIVER: CPT | Performed by: ANESTHESIOLOGY

## 2024-06-22 PROCEDURE — 812N000006 HC ACQUISITION LIVER CADAVER DONOR

## 2024-06-22 PROCEDURE — 85049 AUTOMATED PLATELET COUNT: CPT | Performed by: SURGERY

## 2024-06-22 PROCEDURE — 87641 MR-STAPH DNA AMP PROBE: CPT | Performed by: SURGERY

## 2024-06-22 PROCEDURE — P9016 RBC LEUKOCYTES REDUCED: HCPCS

## 2024-06-22 PROCEDURE — 85730 THROMBOPLASTIN TIME PARTIAL: CPT | Performed by: SURGERY

## 2024-06-22 PROCEDURE — 250N000013 HC RX MED GY IP 250 OP 250 PS 637: Performed by: SURGERY

## 2024-06-22 PROCEDURE — P9059 PLASMA, FRZ BETWEEN 8-24HOUR: HCPCS | Performed by: PHYSICIAN ASSISTANT

## 2024-06-22 PROCEDURE — 71045 X-RAY EXAM CHEST 1 VIEW: CPT | Mod: 26 | Performed by: RADIOLOGY

## 2024-06-22 PROCEDURE — 250N000011 HC RX IP 250 OP 636: Mod: JZ | Performed by: SURGERY

## 2024-06-22 PROCEDURE — P9059 PLASMA, FRZ BETWEEN 8-24HOUR: HCPCS

## 2024-06-22 PROCEDURE — 250N000011 HC RX IP 250 OP 636: Performed by: PHYSICIAN ASSISTANT

## 2024-06-22 PROCEDURE — 87075 CULTR BACTERIA EXCEPT BLOOD: CPT | Performed by: TRANSPLANT SURGERY

## 2024-06-22 PROCEDURE — 0FB10ZX EXCISION OF RIGHT LOBE LIVER, OPEN APPROACH, DIAGNOSTIC: ICD-10-PCS | Performed by: TRANSPLANT SURGERY

## 2024-06-22 PROCEDURE — 99223 1ST HOSP IP/OBS HIGH 75: CPT | Performed by: PHYSICIAN ASSISTANT

## 2024-06-22 PROCEDURE — 82955 ASSAY OF G6PD ENZYME: CPT | Performed by: TRANSPLANT SURGERY

## 2024-06-22 DEVICE — SOF-FLEX DOUBLE PIGTAIL URETERAL STENT SET
Type: IMPLANTABLE DEVICE | Site: BILE DUCT | Status: FUNCTIONAL
Brand: SOF-FLEX

## 2024-06-22 RX ORDER — KETAMINE HYDROCHLORIDE 10 MG/ML
INJECTION INTRAMUSCULAR; INTRAVENOUS PRN
Status: DISCONTINUED | OUTPATIENT
Start: 2024-06-22 | End: 2024-06-22

## 2024-06-22 RX ORDER — HYDROMORPHONE HCL IN WATER/PF 6 MG/30 ML
.2-.4 PATIENT CONTROLLED ANALGESIA SYRINGE INTRAVENOUS
Status: DISCONTINUED | OUTPATIENT
Start: 2024-06-22 | End: 2024-06-24

## 2024-06-22 RX ORDER — FIBRINOGEN (HUMAN) 700-1300MG
1150 KIT INTRAVENOUS ONCE
Status: DISCONTINUED | OUTPATIENT
Start: 2024-06-22 | End: 2024-06-22

## 2024-06-22 RX ORDER — NICOTINE POLACRILEX 4 MG
15-30 LOZENGE BUCCAL
Status: DISCONTINUED | OUTPATIENT
Start: 2024-06-22 | End: 2024-06-22

## 2024-06-22 RX ORDER — PROCHLORPERAZINE 25 MG
25 SUPPOSITORY, RECTAL RECTAL EVERY 12 HOURS PRN
Status: DISCONTINUED | OUTPATIENT
Start: 2024-06-22 | End: 2024-06-28 | Stop reason: HOSPADM

## 2024-06-22 RX ORDER — DEXTROSE MONOHYDRATE 100 MG/ML
INJECTION, SOLUTION INTRAVENOUS CONTINUOUS PRN
Status: DISCONTINUED | OUTPATIENT
Start: 2024-06-22 | End: 2024-06-27

## 2024-06-22 RX ORDER — MAGNESIUM SULFATE HEPTAHYDRATE 40 MG/ML
2 INJECTION, SOLUTION INTRAVENOUS ONCE
Status: COMPLETED | OUTPATIENT
Start: 2024-06-22 | End: 2024-06-22

## 2024-06-22 RX ORDER — LABETALOL HYDROCHLORIDE 5 MG/ML
20 INJECTION, SOLUTION INTRAVENOUS EVERY 4 HOURS PRN
Status: DISCONTINUED | OUTPATIENT
Start: 2024-06-22 | End: 2024-06-22

## 2024-06-22 RX ORDER — NICOTINE POLACRILEX 4 MG
15-30 LOZENGE BUCCAL
Status: DISCONTINUED | OUTPATIENT
Start: 2024-06-22 | End: 2024-06-27

## 2024-06-22 RX ORDER — FENTANYL CITRATE 50 UG/ML
INJECTION, SOLUTION INTRAMUSCULAR; INTRAVENOUS PRN
Status: DISCONTINUED | OUTPATIENT
Start: 2024-06-22 | End: 2024-06-22

## 2024-06-22 RX ORDER — FIBRINOGEN (HUMAN) 700-1300MG
1150 KIT INTRAVENOUS ONCE
Status: COMPLETED | OUTPATIENT
Start: 2024-06-22 | End: 2024-06-22

## 2024-06-22 RX ORDER — ONDANSETRON 2 MG/ML
4 INJECTION INTRAMUSCULAR; INTRAVENOUS EVERY 6 HOURS PRN
Status: DISCONTINUED | OUTPATIENT
Start: 2024-06-22 | End: 2024-06-28 | Stop reason: HOSPADM

## 2024-06-22 RX ORDER — MYCOPHENOLATE MOFETIL 250 MG/1
750 CAPSULE ORAL
Status: DISCONTINUED | OUTPATIENT
Start: 2024-06-22 | End: 2024-06-28 | Stop reason: HOSPADM

## 2024-06-22 RX ORDER — LABETALOL HYDROCHLORIDE 5 MG/ML
10-20 INJECTION, SOLUTION INTRAVENOUS EVERY 4 HOURS PRN
Status: DISCONTINUED | OUTPATIENT
Start: 2024-06-22 | End: 2024-06-26

## 2024-06-22 RX ORDER — ASPIRIN 81 MG/1
81 TABLET, CHEWABLE ORAL DAILY
Status: DISCONTINUED | OUTPATIENT
Start: 2024-06-22 | End: 2024-06-28 | Stop reason: HOSPADM

## 2024-06-22 RX ORDER — PROPOFOL 10 MG/ML
INJECTION, EMULSION INTRAVENOUS PRN
Status: DISCONTINUED | OUTPATIENT
Start: 2024-06-22 | End: 2024-06-22

## 2024-06-22 RX ORDER — PROCHLORPERAZINE MALEATE 5 MG
10 TABLET ORAL EVERY 6 HOURS PRN
Status: DISCONTINUED | OUTPATIENT
Start: 2024-06-22 | End: 2024-06-28 | Stop reason: HOSPADM

## 2024-06-22 RX ORDER — PIPERACILLIN SODIUM, TAZOBACTAM SODIUM 3; .375 G/15ML; G/15ML
3.38 INJECTION, POWDER, LYOPHILIZED, FOR SOLUTION INTRAVENOUS EVERY 6 HOURS
Status: COMPLETED | OUTPATIENT
Start: 2024-06-22 | End: 2024-06-24

## 2024-06-22 RX ORDER — URSODIOL 300 MG/1
300 CAPSULE ORAL 2 TIMES DAILY
Status: DISCONTINUED | OUTPATIENT
Start: 2024-06-22 | End: 2024-06-28 | Stop reason: HOSPADM

## 2024-06-22 RX ORDER — FLUCONAZOLE 2 MG/ML
400 INJECTION, SOLUTION INTRAVENOUS ONCE
Status: COMPLETED | OUTPATIENT
Start: 2024-06-23 | End: 2024-06-23

## 2024-06-22 RX ORDER — CALCIUM CHLORIDE 100 MG/ML
INJECTION INTRAVENOUS; INTRAVENTRICULAR PRN
Status: DISCONTINUED | OUTPATIENT
Start: 2024-06-22 | End: 2024-06-22

## 2024-06-22 RX ORDER — OXYCODONE HYDROCHLORIDE 5 MG/1
5-10 TABLET ORAL EVERY 4 HOURS PRN
Status: DISCONTINUED | OUTPATIENT
Start: 2024-06-22 | End: 2024-06-25

## 2024-06-22 RX ORDER — VALGANCICLOVIR HYDROCHLORIDE 50 MG/ML
450 POWDER, FOR SOLUTION ORAL DAILY
Status: DISCONTINUED | OUTPATIENT
Start: 2024-06-22 | End: 2024-06-24 | Stop reason: ALTCHOICE

## 2024-06-22 RX ORDER — METHYLPREDNISOLONE SODIUM SUCCINATE 125 MG/2ML
50 INJECTION, POWDER, LYOPHILIZED, FOR SOLUTION INTRAMUSCULAR; INTRAVENOUS ONCE
Status: COMPLETED | OUTPATIENT
Start: 2024-06-25 | End: 2024-06-25

## 2024-06-22 RX ORDER — TACROLIMUS 1 MG/1
2 CAPSULE ORAL
Status: DISCONTINUED | OUTPATIENT
Start: 2024-06-22 | End: 2024-06-25

## 2024-06-22 RX ORDER — NALOXONE HYDROCHLORIDE 0.4 MG/ML
0.4 INJECTION, SOLUTION INTRAMUSCULAR; INTRAVENOUS; SUBCUTANEOUS
Status: DISCONTINUED | OUTPATIENT
Start: 2024-06-22 | End: 2024-06-28 | Stop reason: HOSPADM

## 2024-06-22 RX ORDER — ONDANSETRON 4 MG/1
4 TABLET, ORALLY DISINTEGRATING ORAL EVERY 6 HOURS PRN
Status: DISCONTINUED | OUTPATIENT
Start: 2024-06-22 | End: 2024-06-28 | Stop reason: HOSPADM

## 2024-06-22 RX ORDER — DAPSONE 25 MG/1
100 TABLET ORAL DAILY
Status: DISCONTINUED | OUTPATIENT
Start: 2024-06-22 | End: 2024-06-25

## 2024-06-22 RX ORDER — NITROGLYCERIN 10 MG/100ML
INJECTION INTRAVENOUS PRN
Status: DISCONTINUED | OUTPATIENT
Start: 2024-06-22 | End: 2024-06-22

## 2024-06-22 RX ORDER — CALCIUM GLUCONATE 20 MG/ML
INJECTION, SOLUTION INTRAVENOUS PRN
Status: DISCONTINUED | OUTPATIENT
Start: 2024-06-22 | End: 2024-06-22

## 2024-06-22 RX ORDER — VALGANCICLOVIR 450 MG/1
450 TABLET, FILM COATED ORAL DAILY
Status: DISCONTINUED | OUTPATIENT
Start: 2024-06-22 | End: 2024-06-28 | Stop reason: HOSPADM

## 2024-06-22 RX ORDER — DEXTROSE MONOHYDRATE AND SODIUM CHLORIDE 5; .45 G/100ML; G/100ML
INJECTION, SOLUTION INTRAVENOUS CONTINUOUS
Status: DISCONTINUED | OUTPATIENT
Start: 2024-06-22 | End: 2024-06-23

## 2024-06-22 RX ORDER — PREDNISONE 10 MG/1
10 TABLET ORAL ONCE
Status: COMPLETED | OUTPATIENT
Start: 2024-06-27 | End: 2024-06-27

## 2024-06-22 RX ORDER — DEXTROSE MONOHYDRATE 25 G/50ML
25-50 INJECTION, SOLUTION INTRAVENOUS
Status: DISCONTINUED | OUTPATIENT
Start: 2024-06-22 | End: 2024-06-27

## 2024-06-22 RX ORDER — METHYLPREDNISOLONE SODIUM SUCCINATE 125 MG/2ML
100 INJECTION, POWDER, LYOPHILIZED, FOR SOLUTION INTRAMUSCULAR; INTRAVENOUS ONCE
Status: COMPLETED | OUTPATIENT
Start: 2024-06-24 | End: 2024-06-24

## 2024-06-22 RX ORDER — POTASSIUM CHLORIDE 29.8 MG/ML
20 INJECTION INTRAVENOUS
Status: COMPLETED | OUTPATIENT
Start: 2024-06-22 | End: 2024-06-22

## 2024-06-22 RX ORDER — NALOXONE HYDROCHLORIDE 0.4 MG/ML
0.2 INJECTION, SOLUTION INTRAMUSCULAR; INTRAVENOUS; SUBCUTANEOUS
Status: DISCONTINUED | OUTPATIENT
Start: 2024-06-22 | End: 2024-06-28 | Stop reason: HOSPADM

## 2024-06-22 RX ORDER — HYDRALAZINE HYDROCHLORIDE 20 MG/ML
20 INJECTION INTRAMUSCULAR; INTRAVENOUS EVERY 4 HOURS PRN
Status: DISCONTINUED | OUTPATIENT
Start: 2024-06-22 | End: 2024-06-26

## 2024-06-22 RX ORDER — MYCOPHENOLATE MOFETIL 200 MG/ML
750 POWDER, FOR SUSPENSION ORAL
Status: DISCONTINUED | OUTPATIENT
Start: 2024-06-22 | End: 2024-06-24 | Stop reason: ALTCHOICE

## 2024-06-22 RX ORDER — SODIUM CHLORIDE, SODIUM GLUCONATE, SODIUM ACETATE, POTASSIUM CHLORIDE AND MAGNESIUM CHLORIDE 526; 502; 368; 37; 30 MG/100ML; MG/100ML; MG/100ML; MG/100ML; MG/100ML
INJECTION, SOLUTION INTRAVENOUS CONTINUOUS PRN
Status: DISCONTINUED | OUTPATIENT
Start: 2024-06-22 | End: 2024-06-22

## 2024-06-22 RX ORDER — POTASSIUM CHLORIDE 750 MG/1
20 TABLET, EXTENDED RELEASE ORAL ONCE
Status: COMPLETED | OUTPATIENT
Start: 2024-06-22 | End: 2024-06-22

## 2024-06-22 RX ORDER — DEXTROSE MONOHYDRATE 25 G/50ML
25-50 INJECTION, SOLUTION INTRAVENOUS
Status: DISCONTINUED | OUTPATIENT
Start: 2024-06-22 | End: 2024-06-22

## 2024-06-22 RX ORDER — FIBRINOGEN (HUMAN) 700-1300MG
4 KIT INTRAVENOUS ONCE
Status: DISCONTINUED | OUTPATIENT
Start: 2024-06-22 | End: 2024-06-22

## 2024-06-22 RX ADMIN — OXYCODONE HYDROCHLORIDE 5 MG: 5 TABLET ORAL at 17:56

## 2024-06-22 RX ADMIN — CALCIUM CHLORIDE 1 G: 100 INJECTION, SOLUTION INTRAVENOUS at 10:47

## 2024-06-22 RX ADMIN — URSODIOL 300 MG: 300 CAPSULE ORAL at 19:33

## 2024-06-22 RX ADMIN — DEXTROSE AND SODIUM CHLORIDE: 5; 450 INJECTION, SOLUTION INTRAVENOUS at 14:22

## 2024-06-22 RX ADMIN — PIPERACILLIN AND TAZOBACTAM 3.38 G: 3; .375 INJECTION, POWDER, FOR SOLUTION INTRAVENOUS at 13:06

## 2024-06-22 RX ADMIN — ALBUMIN HUMAN 25 G: 0.05 INJECTION, SOLUTION INTRAVENOUS at 18:00

## 2024-06-22 RX ADMIN — POTASSIUM CHLORIDE 20 MEQ: 29.8 INJECTION, SOLUTION INTRAVENOUS at 17:08

## 2024-06-22 RX ADMIN — OXYCODONE HYDROCHLORIDE 5 MG: 5 TABLET ORAL at 19:33

## 2024-06-22 RX ADMIN — POTASSIUM CHLORIDE 20 MEQ: 750 TABLET, EXTENDED RELEASE ORAL at 21:17

## 2024-06-22 RX ADMIN — HYDROMORPHONE HYDROCHLORIDE 0.5 MG: 1 INJECTION, SOLUTION INTRAMUSCULAR; INTRAVENOUS; SUBCUTANEOUS at 11:20

## 2024-06-22 RX ADMIN — PIPERACILLIN AND TAZOBACTAM 3.38 G: 3; .375 INJECTION, POWDER, FOR SOLUTION INTRAVENOUS at 08:38

## 2024-06-22 RX ADMIN — SODIUM CHLORIDE 1000 MG: 9 INJECTION, SOLUTION INTRAVENOUS at 11:29

## 2024-06-22 RX ADMIN — OXYCODONE HYDROCHLORIDE 10 MG: 5 TABLET ORAL at 23:37

## 2024-06-22 RX ADMIN — MYCOPHENOLATE MOFETIL 750 MG: 250 CAPSULE ORAL at 17:56

## 2024-06-22 RX ADMIN — ASPIRIN 81 MG CHEWABLE TABLET 81 MG: 81 TABLET CHEWABLE at 17:59

## 2024-06-22 RX ADMIN — VALGANCICLOVIR HYDROCHLORIDE 450 MG: 450 TABLET ORAL at 17:58

## 2024-06-22 RX ADMIN — CALCIUM CHLORIDE 1 G: 100 INJECTION, SOLUTION INTRAVENOUS at 11:03

## 2024-06-22 RX ADMIN — FENTANYL CITRATE 150 MCG: 50 INJECTION INTRAMUSCULAR; INTRAVENOUS at 06:05

## 2024-06-22 RX ADMIN — SODIUM CHLORIDE, SODIUM GLUCONATE, SODIUM ACETATE, POTASSIUM CHLORIDE AND MAGNESIUM CHLORIDE: 526; 502; 368; 37; 30 INJECTION, SOLUTION INTRAVENOUS at 06:05

## 2024-06-22 RX ADMIN — HYDROMORPHONE HYDROCHLORIDE 0.5 MG: 1 INJECTION, SOLUTION INTRAMUSCULAR; INTRAVENOUS; SUBCUTANEOUS at 13:20

## 2024-06-22 RX ADMIN — Medication 200 MG: at 13:14

## 2024-06-22 RX ADMIN — FENTANYL CITRATE 100 MCG: 50 INJECTION INTRAMUSCULAR; INTRAVENOUS at 06:58

## 2024-06-22 RX ADMIN — INSULIN HUMAN 3 UNITS/HR: 1 INJECTION, SOLUTION INTRAVENOUS at 15:20

## 2024-06-22 RX ADMIN — PROTHROMBIN, COAGULATION FACTOR VII HUMAN, COAGULATION FACTOR IX HUMAN, COAGULATION FACTOR X HUMAN, PROTEIN C, PROTEIN S HUMAN, AND WATER 1084 UNITS: KIT at 12:24

## 2024-06-22 RX ADMIN — ALBUMIN HUMAN 25 G: 0.05 INJECTION, SOLUTION INTRAVENOUS at 20:43

## 2024-06-22 RX ADMIN — TACROLIMUS 2 MG: 1 CAPSULE ORAL at 17:56

## 2024-06-22 RX ADMIN — POTASSIUM CHLORIDE 20 MEQ: 29.8 INJECTION, SOLUTION INTRAVENOUS at 15:50

## 2024-06-22 RX ADMIN — LABETALOL HYDROCHLORIDE 10 MG: 5 INJECTION, SOLUTION INTRAVENOUS at 23:53

## 2024-06-22 RX ADMIN — PIPERACILLIN SODIUM AND TAZOBACTAM SODIUM 3.38 G: 3; .375 INJECTION, POWDER, LYOPHILIZED, FOR SOLUTION INTRAVENOUS at 19:34

## 2024-06-22 RX ADMIN — NITROGLYCERIN 25 MCG: 10 INJECTION INTRAVENOUS at 09:35

## 2024-06-22 RX ADMIN — FENTANYL CITRATE 50 MCG: 50 INJECTION INTRAMUSCULAR; INTRAVENOUS at 07:59

## 2024-06-22 RX ADMIN — NITROGLYCERIN 25 MCG: 10 INJECTION INTRAVENOUS at 10:05

## 2024-06-22 RX ADMIN — FENTANYL CITRATE 150 MCG: 50 INJECTION INTRAMUSCULAR; INTRAVENOUS at 08:48

## 2024-06-22 RX ADMIN — FENTANYL CITRATE 100 MCG: 50 INJECTION INTRAMUSCULAR; INTRAVENOUS at 07:46

## 2024-06-22 RX ADMIN — MIDAZOLAM 2 MG: 1 INJECTION INTRAMUSCULAR; INTRAVENOUS at 06:05

## 2024-06-22 RX ADMIN — MAGNESIUM SULFATE HEPTAHYDRATE 2 G: 2 INJECTION, SOLUTION INTRAVENOUS at 17:59

## 2024-06-22 RX ADMIN — HYDROMORPHONE HYDROCHLORIDE 0.2 MG: 0.2 INJECTION, SOLUTION INTRAMUSCULAR; INTRAVENOUS; SUBCUTANEOUS at 14:43

## 2024-06-22 RX ADMIN — PIPERACILLIN AND TAZOBACTAM 3.38 G: 3; .375 INJECTION, POWDER, FOR SOLUTION INTRAVENOUS at 10:52

## 2024-06-22 RX ADMIN — SODIUM CHLORIDE, POTASSIUM CHLORIDE, SODIUM LACTATE AND CALCIUM CHLORIDE 500 ML: 600; 310; 30; 20 INJECTION, SOLUTION INTRAVENOUS at 16:23

## 2024-06-22 RX ADMIN — FLUCONAZOLE IN SODIUM CHLORIDE 400 MG: 2 INJECTION, SOLUTION INTRAVENOUS at 06:37

## 2024-06-22 RX ADMIN — INSULIN HUMAN 16 UNITS/HR: 1 INJECTION, SOLUTION INTRAVENOUS at 22:03

## 2024-06-22 RX ADMIN — PHENYLEPHRINE HYDROCHLORIDE 100 MCG: 10 INJECTION INTRAVENOUS at 11:56

## 2024-06-22 RX ADMIN — Medication 20 MG: at 12:36

## 2024-06-22 RX ADMIN — DAPSONE 100 MG: 100 TABLET ORAL at 17:58

## 2024-06-22 RX ADMIN — CALCIUM GLUCONATE 1 G: 20 INJECTION, SOLUTION INTRAVENOUS at 08:45

## 2024-06-22 RX ADMIN — NITROGLYCERIN 25 MCG: 10 INJECTION INTRAVENOUS at 09:29

## 2024-06-22 RX ADMIN — NITROGLYCERIN 50 MCG: 10 INJECTION INTRAVENOUS at 09:20

## 2024-06-22 RX ADMIN — PIPERACILLIN AND TAZOBACTAM 2.25 G: 3; .375 INJECTION, POWDER, FOR SOLUTION INTRAVENOUS at 06:37

## 2024-06-22 RX ADMIN — NITROGLYCERIN 25 MCG: 10 INJECTION INTRAVENOUS at 09:45

## 2024-06-22 RX ADMIN — INSULIN HUMAN 8 UNITS/HR: 1 INJECTION, SOLUTION INTRAVENOUS at 16:19

## 2024-06-22 RX ADMIN — FIBRINOGEN (HUMAN) 1150 MG: KIT INTRAVENOUS at 08:48

## 2024-06-22 RX ADMIN — INSULIN HUMAN 13 UNITS/HR: 1 INJECTION, SOLUTION INTRAVENOUS at 17:07

## 2024-06-22 RX ADMIN — INSULIN HUMAN 2 UNITS/HR: 1 INJECTION, SOLUTION INTRAVENOUS at 14:23

## 2024-06-22 RX ADMIN — FENTANYL CITRATE 100 MCG: 50 INJECTION INTRAMUSCULAR; INTRAVENOUS at 09:03

## 2024-06-22 RX ADMIN — NITROGLYCERIN 50 MCG: 10 INJECTION INTRAVENOUS at 07:58

## 2024-06-22 RX ADMIN — Medication 100 MG: at 06:05

## 2024-06-22 RX ADMIN — FENTANYL CITRATE 100 MCG: 50 INJECTION INTRAMUSCULAR; INTRAVENOUS at 07:35

## 2024-06-22 RX ADMIN — Medication 30 MG: at 09:07

## 2024-06-22 RX ADMIN — PROPOFOL 50 MG: 10 INJECTION, EMULSION INTRAVENOUS at 06:05

## 2024-06-22 RX ADMIN — CALCIUM CHLORIDE INJECTION 1 G: 100 INJECTION, SOLUTION INTRAVENOUS at 08:25

## 2024-06-22 ASSESSMENT — ACTIVITIES OF DAILY LIVING (ADL)
ADLS_ACUITY_SCORE: 22
ADLS_ACUITY_SCORE: 32
ADLS_ACUITY_SCORE: 22
ADLS_ACUITY_SCORE: 32
ADLS_ACUITY_SCORE: 22
ADLS_ACUITY_SCORE: 22
ADLS_ACUITY_SCORE: 32
ADLS_ACUITY_SCORE: 22
ADLS_ACUITY_SCORE: 32
ADLS_ACUITY_SCORE: 22
ADLS_ACUITY_SCORE: 22
ADLS_ACUITY_SCORE: 32
ADLS_ACUITY_SCORE: 32
ADLS_ACUITY_SCORE: 22
ADLS_ACUITY_SCORE: 22
ADLS_ACUITY_SCORE: 24
ADLS_ACUITY_SCORE: 32
ADLS_ACUITY_SCORE: 22

## 2024-06-22 NOTE — PLAN OF CARE
Goal Outcome Evaluation:    Vitally stable, denies pain. Up independently. Good appetite on regular diet - will be NPO at midnight for liver transplant tomorrow, tentative OR time 04:00.  Family at bedside. Consent signed.

## 2024-06-22 NOTE — ANESTHESIA PROCEDURE NOTES
From: Yadira Jones  To: Cyndy Powell  Sent: 4/20/2021  9:49 PM CDT  Subject: Non-Urgent Medical Question    Hi Dr. Powell,    I found out I'm pregnant shortly after my last visit with you. I stopped the retinol cream you prescribed right away but is there anything that would be safe for me to use to clear up acne?? I'm having terrible hormonal breakouts  on my chin specifically. I'm currently just using a gentle cleanser and moisturizer.     Thank you in advance,    Yadira Powell-  Please advise if there are any acne medications that can be prescribed for patient while pregnant. Thank you.   Procedures

## 2024-06-22 NOTE — PLAN OF CARE
/68 (BP Location: Right arm)   Pulse 81   Temp 98.1  F (36.7  C) (Oral)   Resp 16   Wt 77.3 kg (170 lb 8 oz)   LMP 05/31/2006   SpO2 96%   BMI 28.37 kg/m      Shift: 6616-1488  Isolation Status: None  VS: VSS on RA, afebrile  Neuro: Aox4, able to make needs known  Behaviors: Calm, cooperative, pleasant  BG: N/A  Labs/Imaging: Na 134, K 4.0, Cl 102, CO2 23, BUN 11.0, Cr 0.77, Ca 8.9, Mg 1.9, Phos 3.2, albumin 3.7, alk phos 112, ALT 37, AST 71, amylase 124, Tbili 1.8, WBC 5.0, Hgb 13.8  Respiratory: WDL room air  Cardiac: WDL  Pain/Nausea: Denies  PRN: N/A  Diet: NPO  IV Access: R PIV SL  Infusion(s): N/A  Lines/Drains: N/A  GI/: Voiding spontaneously without difficulty. LBM 6/20.  Skin: WDL no new concerns  Mobility: UAL  Events/Education: NPO since midnight for liver transplant in AM.  at bedside @ 0400. Bedside consent completed by surgery team, pt transferred to OR by surgery team @ 4683.  Plan: To OR for DDLT 0600. Pt will transfer to  after surgery.

## 2024-06-22 NOTE — ANESTHESIA PROCEDURE NOTES
Central Line/PA Catheter Placement    Pre-Procedure   Staff -        Anesthesiologist:  Levon Pitts MD       Resident/Fellow: Chris Darling MD       Performed By: with residents       Procedure performed by resident/fellow/CRNA in presence of a teaching physician.         Location: OR       Pre-Anesthestic Checklist: patient identified, IV checked, site marked, risks and benefits discussed, informed consent, monitors and equipment checked, pre-op evaluation and at physician/surgeon's request  Timeout:       Correct Patient: Yes        Correct Procedure: Yes        Correct Site: Yes        Correct Position: Yes        Correct Laterality: Yes   Line Placement:   This line was placed Post Induction    Procedure   Procedure: central line       Laterality: right       Insertion Site: internal jugular.       Patient Position: Trendelenburg  Sterile Prep        All elements of maximal sterile barrier technique followed       Patient Prep/Sterile Barriers: draped, hand hygiene, gloves , hat , mask , draped, gown, sterile gel and probe cover       Skin prep: Chloraprep  Insertion/Injection        Technique: ultrasound guided and Seldinger Technique        1. Ultrasound was used to evaluate the access site.       2. Vein evaluated via ultrasound for patency/adequacy.       3. Using real-time ultrasound the needle/catheter was observed entering the artery/vein.       Introducer Type: 9 Fr, 2-lumen MAC        Type: PA/CVC with Introducer       Catheter Size: 9 Fr       Catheter Length: 11.5       Number of Lumens: double lumen       PA Catheter Type: CCO         Appropriate RV, RA and PA waveforms noted:  Yes  Narrative         Secured by: suture       Tegaderm and Biopatch dressing used.       Complications: None apparent,        blood aspirated from all lumens,        All lumens flushed: Yes       Verification method: Placement to be verified post-op, Ultrasound and JAYSON   Comments:  Difficulty floating swan initially  with significant ectopy and sustained SVT with hypotension. Aborted, removed swan. Placed hands free triple lumen through right IJ introducer, locked at 19cm.     Note left subclavian CVC attempted, but unable to thread wire. Subsequent hematoma, unable to thread wire on second attempt. Held pressure and aborted. Moved to the RIJ, see above note.

## 2024-06-22 NOTE — PROGRESS NOTES
Patient removed from OS waitlist after  donor Liver transplant. OS ID [id number] .    Donor Has Risk Criteria for Transmission of HIV/HCV/HBV: no  Recipient Notified of Risk Criteria: no

## 2024-06-22 NOTE — PROGRESS NOTES
I met the patient and explained the risk benefits and alternatives of liver transplantation  I also explained the donor was DCD and we were using nomothermic perfusion and the increased risk of biliary complications with DCD livers  Patient understands the risks and wishes to proceed to transplant  Surgery planned tomorrow at 6 am

## 2024-06-22 NOTE — ANESTHESIA PROCEDURE NOTES
Perioperative JAYSON Procedure Note    Staff -        Anesthesiologist:  Levon Pitts MD       Resident/Fellow: Chris Darling MD       Performed By: anesthesiologist and resident      JAYSON Probe Insertion    Probe Status PRE Insertion: NO obvious damage  Probe type:  Adult 3D  Bite block used:   Soft  Insertion Technique: Easy, no oropharyngeal manipulation  Insertion complications: None obvious  Billing Report:JAYSON report by Anesthesiologist (See Separate Report note)  Probe Status POST Removal: NO obvious damage    JAYSON Report  General Procedure Information  Images for this study have been archived.    Post Intervention Findings  Procedure(s) performed:  Other (see comments) (liver tx).  Regional wall motion:. Surgeon(s) notified of all postintervention findings: Yes.                 Echocardiogram Comments  Echocardiogram comments: Normal RV size and systolic function based on visual assessment. Trace TR. RV systolic pressure  + CVP based on TR jet. LV hypertrophy. Normal LV size and systolic function by visual assessment. Normal mitral valve. Aortic valve was tricuspid, competent and without stenosis. Normal ascending and descending aorta. No aortic dissection. No pericardial effusion. .

## 2024-06-22 NOTE — OP NOTE
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 Liver Transplant  2. Post liver perfusion liver biopsy    DCD donor on OCS normothermic perfusion 19.41 hours          Surgeon:  Surgeons and Role:     * Pravin Ball MD - Primary     * Isaac Vargas MD - Fellow - Assisting    Fellow/assistant:   aggie Hou   There was no qualified resident to assist with this procedure    Anesthesia:  General    Specimen:  Native liver, donor gallbladder    Drains:  1    Urine output:  N/a mls    Estimated blood loss:  3 units blood transfused, blood loss less than 500 ml    Fluids administered:        Intraoperative Events: noen    Complications: None    Findings: no  Indication: Abiola Matute with a history of End Stage Liver Disease due to hepatocelluar carcinoma who presents for Donation after Circulatory Death  Whole Liver transplant. (DCD warm ischemia 27min)suitable donor offer has become available. After discussing the risks and benefits of proceeding, the patient agreed to proceed with surgery and provided informed consent.    Final ABO/Crossmatch verification: After the donor organ arrived to the operating room and prior to anastomosis, I participated in the transplant pre-verification upon organ receipt timeout by visually verifying the donor ID, organ and laterality, donor blood type, recipient unique identifier, recipient blood type, and that the donor and recipient are blood type compatible.     Donor UNOS ID:  [id number]    Donor ABO:  A    Donor arterial clamp on:  6/21/2024 11:52 AM    Preservation fluid:  UW      Vessels with organ:  Yes    Donor organ arrival to recipient room:  6/22/2024  9:33 AM    Total ischemic time:  1376    Cold ischemic time:  1,376    Warm ischemic time:  47    Ex-vivo:  Yes    Time placed on Ex-vivo perfusion:  Yes     Total time on Ex-vivo perfusion:   20 hrs; opening lactate 6 and closing lactate 0.3     Back Table Preparation:   Procedure:  Bench preparation of the liver allograft for transplantation    Preoperative diagnosis:  End Stage Liver Disease due to hepatocelluar carcinoma    Postoperative diagnosis:  Same    Surgeon:  Surgeons and Role:     * Pravin Ball MD - Primary     * Isaac Vargas MD - Fellow - Assisting    Co-Surgeon:      Fellow/Assistant:  Isaac Vargas, fellow,  ,   There was no qualified resident to assist with this procedure    Anesthesia:  None    Graft biopsy:  yes    Macroscopic steatosis:      Back table reconstruction:  No Reconstruction    Intimal flap repair:  no    # of hepatic arteries:  1    # of portal veins:  1    Accessory arteries:  0    # of hepatic veins:  3    # of bile ducts:  1    Graft weight:       Findings:   Liver Laceration: No  Overall quality of liver: Good    Back Table Procedure: The liver allograft was received and inspected and the aforementioned findings were noted. It was flushed with UW. The donor liver was placed in fresh ice-cold preservation solution.  No reconstruction was required. The liver was placed back in ice-cold preservation solution until ready for transplantation. Faculty was present for the critical portions of the procedure.    Findings: none   Operative Procedure:   Arterial anastomosis start:  6/22/2024 10:01 AM    Recipient arterial unclamp:  6/22/2024 11:27 AM    Extra vessels used:   no    Extra vessels banked:  Yes    Previous upper abd surgery:  No    Previous cholecystectomy?  No    Portal vein:  Thrombus: No   Patent: Yes-     Specify:     On portal bypass:  Yes-      Arterial flow:  Sufficient: Yes-  200    Bile duct anastomosis:  To bowel: No   Specify:    To duct: yes, donor duct was 7mm, recipient duct 10 mm continous suture 6-0 prolene with a single J stent   Specify:      Abiola AVA Matute was brought to the operating room, placed  in a supine position, and a time out was performed. Sequential compression devices were placed on both lower extremities and general endotracheal anesthesia was induced. The patient was given IV antibiotics, and Solumedrol. A Conti catheter was placed. A central line was placed by Anesthesia service. The abdomen was then shaved, prepped, and draped in the usual sterile fashion.  The backtable preparation occurred prior to implantation.    We entered the abdominal cavity using Vertical Extension (Flower) incision. The abdomen was examined. We proceeded to mobilization of the liver first by dividing falciform ligament, next dividing the triangular ligaments and mobilizing the left lobe with further evaluation of the right lobe of the liver. Next the right lobe of the liver was mobilized. We elected to proceed with a hilar dissection. The right and left hepatic arteries were identified, ligated and divided, followed by ligation and division of the common bile duct. The portal vein was cleared from tissue and small branches were tied off and divided. The left and right portal veins were separately ligated and the portal vein was divided. At this point we went on the bypass with bypass flow of 1 liter per minute. Next, we continued mobilizing the right lobe. The adhesions between the right lobe and the right diaphragm were divided and the lobe was mobilized up to the vena cava. The hepatic veins were identified. Next, we dissected out the caudate lobe and infrahepatic IVC.     We applied Infrahepatic and suprahepatic clamps to the IVC and the liver was excised with curved Castillo scissors. The recipient's liver was passed off from the operating table. Next, complete hemostasis was obtained. The donor liver was brought into the field. The suprahepatic IVC anastomosis was constructed with 3-0 Prolene followed by the construction of infrahepatic anastomosis with 4-0 Prolene. Next, we came off the bypass and end-to-end portal  anastomosis was constructed between the donor and recipient portal veins using 6-0 Prolene. During the construction of the infrahepatic IVC anastomosis, the liver was flushed with 5% albumin. Once the portal anastomosis was constructed, the liver was reperfused. Complete hemostasis was obtained and arterial anastomosis was performed between the donor celiac trunk patch using Johnson technique and the bifurcation of the right and left recipient hepatic arteries. After clamps were released, there was a good flow within the donor artery. Again, all the arterial branches that were bleeding were ligated and complete hemostasis was obtained. After the anastomosis was performed, good flow was re-established, hemostasis was obtained. Next, the biliary anastomosis was performed using a 6-0 running PDS suture over the stent. We then performed a liver biopsy from the right lobe of liver using a jayleen cut needle, the site was stitched with 3-0 silk and hemostasis secured.    Next again the abdomen was irrigated and hemostasis was obtained. We closed the abdomen with #1 PDS. All needle, sponge and instrument counts were correct x 2. Faculty was present for key portions of the procedure. The patient was awakened, extubated, and transferred to the ICU for post-op monitoring.    Physician Attestation   I was present for the entire procedure between opening and closing.    Pravin Ball MD  Date of Service (when I saw the patient): 06/22/24  The transplant fellow,  , was present and assisted with all aspects of the operation described above from opening to closing.  There was no suitable surgical resident available to assist in this procedure.

## 2024-06-22 NOTE — ANESTHESIA PROCEDURE NOTES
Airway       Patient location during procedure: OR       Procedure Start/Stop Times: 6/22/2024 6:08 AM  Staff -        Anesthesiologist:  Levon Pitts MD       Resident/Fellow: Chris Darling MD       Other Anesthesia Staff: Zena Reyes MD       Performed By: with residents       Procedure performed by resident/fellow/CRNA in presence of a teaching physician.    Consent for Airway        Urgency: elective  Indications and Patient Condition       Indications for airway management: miguel angel-procedural       Induction type:RSI       Mask difficulty assessment: 0 - not attempted    Final Airway Details       Final airway type: endotracheal airway       Successful airway: ETT - single and Oral  Endotracheal Airway Details        ETT size (mm): 8.0       Cuffed: yes       Successful intubation technique: direct laryngoscopy       DL Blade Type: MAC 4       Grade View of Cords: 1       Adjucts: stylet       Position: Right       Measured from: lips       Secured at (cm): 24    Post intubation assessment        Placement verified by: capnometry, equal breath sounds and chest rise        Number of attempts at approach: 1       Number of other approaches attempted: 0       Secured with: tape       Ease of procedure: easy       Dentition: Intact    Medication(s) Administered   Medication Administration Time: 6/22/2024 6:08 AM    Additional Comments       Routine intubation.

## 2024-06-22 NOTE — PLAN OF CARE
Major Shift Events:    Patient arrived to unit around 2 pm. Patient currently on 4 L nasal cannula mildly tachycardia with HR in 110s. Insulin drip initiated, algorithm 4. Lactate 7.5. 40 MEq of potassium and 2 mg magnesium given for replacement protocols. 1 unit of plasma given. Lactated ringer bolus of 500 ml along with 500 ml of albumin. Conti catheter in place with adequate urine output. Bowel sounds absent and patient has not passed gas yet.   Plan: Trend labs, monitor lactic, control pain with oxy, mobilize in morning  For vital signs and complete assessments, please see documentation flowsheets.

## 2024-06-22 NOTE — PROGRESS NOTES
"CLINICAL NUTRITION SERVICES - BRIEF NOTE    Nutrition Prescription    RECOMMENDATIONS FOR MDs/PROVIDERS TO ORDER:  - See RD full assessment 6/21  - Please consult if plan to place FT and initiate TFs post-op     Recommendations already ordered by Registered Dietitian (RD):  - Post-op auto consult. Unclear nutrition POC. Pt does not have enteral access.     Future/Additional Recommendations:  - Diet vs FT/TFs?     - Once enteral access placed and verified for use, per prior recs:  -TwoCal HN @ 40 mL/hr (960 mL/day) + 1 packet prosource TF 20 to provide 2000 kcals (32 kcal/kg/day), 101 g PRO (1.6 g/kg/day), 672 mL H2O, 210 g CHO and 5 g Fiber daily.  - start TF at 10 ml/hr for 8 hrs and increase 10 ml q 8 hrs until goal rate  - 30 ml H2O flush q 4 hours for tube patency  - liquid multivitamin w/minerals 15 ml/daily      Nutrition Progress Note - f/u for progress towards previous nutrition POC (see previous  reassessment for details)      Elham Grider RD, LD, [facility]  Neuro ICU Dietitian; 6A Neuro  Vocera \"4E Clinical Dietitian\"  Weekend/holiday \"Weekend Clinical Dietitian\"      Unit RD via Vocera  "

## 2024-06-22 NOTE — CONSULTS
SURGICAL ICU ADMISSION NOTE  06/22/2024        PRIMARY TEAM: Transplant Liver   PRIMARY PHYSICIAN: Dr. 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 Saavedra, thrombocytopenia (~100s), hyponatremia and ascites. PMH also significant for stable tiny pulmonary nodule (3mm) at right major fissure on last CT 7/7/23, osteopenia, LE cellulitis, crohn's disease on infliximab and obesity. Now s/p DDLT 6/21/24 with Dr. Ball.     Patient extubated post procedure. OR notable for left upper arm hematoma from dislodged arterial line and attempt at left subclavian CV Placement. OR/EBL as listed below .      EBL: 1800mL   Plasmalyte: 3500mL   RBC: 2000mL   Plt: 278mL  FFP: 2423mL   PCC/K-Centra: 108/4 units   Fibiryga: 1150 mg      OUP: 500 mL     PLAN:     Neurological:     # Anxiety   # Acute pain post-operative  - Monitor neurological status. Delirium preventions and precautions  -  pain regimen: PRN IVP dilaudid and PO oxycodone.      Pulmonary:   # no acute issues   - patient extubated post procedure  - CXR now to r/o pneumothorax s/p line attempt   - Supplemental oxygen to keep saturation above 92%.  - Incentive spirometer every 15-30 minutes when awake.        Cardiovascular:    # hx of hypertension   # peripheral edema    - hold pta spironolactone/lasix    - patient weaned off pressors upon arrival to ICU, check labs nows  - resuscitate with blood products as needed   - Monitor hemodynamic status. MAP goal >65.  goal to keep BP <160      Gastroenterology/Nutrition:  # Cirrhosis 2/2 DUNN /EtOH complicated by HCC with associated hyponatremia and ascites, now s/p DDLT 6/21/24   # Moderate  Protein calorie deficit malnutrition  (Per RD note 6/21/24)   # Hypoalbuminemia  - q4h hepatic panel  - PRN Zofran  - Hold PTA lactulose and rifaximin  - NPO except meds for now, sips/ice chips once repeat labs and post-liver transplant US done and reviewed.   - hold off NJ and Tf's for now      #Chron's disease  - PTA Infliximab, most recent dose 6/12/24. No need for additional injection at this time      Fluids/Electrolytes/Renal:    # hypocalcemia   # Hyponatremia  - Na 134. Monitor for now   - Hold PTA lasix  - Electrolyte management protocol, replace lytes as able.      # Lactic acidosis  - Continue to monitor serial lactate levels until peak   - mIVF as above     Endocrine:   # Hyperglycemia due to steroids and critical illness   - Insulin gtt. Goal to keep BG< 180 for optimal wound healing.  - Hypoglycemia protocol in place     ID:  # Immunosuppression   # Immunoprophylaxis   - WBC 5.0 this am.   - Immunosuppression: Tacrolimus/MMF and  steroid taper  - Prophylaxis: Fluconazole given pre-op, Zosyn x48 hours, Bactrim, Valcyte, Micafungin x14 days per transplant team  - Monitor for fever and trend WBC count     Cultures: sent 6/22  intraoperative: pending         Heme:  # Acute blood loss anemia   # coagulopathy due to blood loss   # thrombocytopenia   - serial labs and monitoring.   - see above for products and EBL. Replace with products as appropriate. Awaiting labs.      Musculoskeletal:  # Osteopenia   # Osteoarthritis   # Weakness and deconditioning of critical illness   - Physical and occupational therapy consult      General Cares/Prophylaxis:    DVT Prophylaxis: chemical DVT prophylaxis--SCD's   GI Prophylaxis: PPI  Restraints: Restraints for medical healing needed: NO     Lines/ tubes/ drains:  - Right CVC with hands free TLC via introducer   - PIV's  - Right RICC line   - Arterial line-- Right brachial   - Conti   - JACEK drain x 1     Disposition:  -  Surgical ICU.     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 Saavedra, thrombocytopenia (~100s), hyponatremia and ascites. PMH also significant for stable tiny pulmonary nodule (3mm) at right major fissure on last CT 7/7/23, osteopenia, LE cellulitis, crohn's disease on infliximab and obesity.  Admitted to SICU on 6/21/24  s/p DDLT with Dr. Ball. Patient extubated post procedure.    Patient awakening from anesthesia, follow commands  correctly and consistently. Answers questions correctly. Denies fever, chills, sweats, denies chest pain. Reports abdominal pain.     REVIEW OF SYSTEMS: 10 point ROS neg other than the symptoms noted above in the HPI.  PAST MEDICAL HISTORY:   Past Medical History:   Diagnosis Date    Alcohol abuse     Alcoholic cirrhosis of liver with ascites     Anal fistula     Atypical ductal hyperplasia of breast 09/10/2010    ERT not recommended -left - and flat epithelial atypia-scheduled for breast biopsy 9/17/2010     Bifid uvula     Cholelithiasis     Contact perianal dermatitis and other eczema     recurrent - clobetasol     Crohn disease     Fear of flying      - gets Ativan prn.     HCC (hepatocellular carcinoma) (H) 2/1/2023    Hypertension     IBS (irritable bowel syndrome)        SURGICAL HISTORY:   Past Surgical History:   Procedure Laterality Date    BIOPSY ANAL N/A 3/28/2019    anal biopsy and culure placement of seton - Dr Fleming    BIOPSY BREAST Left 09/17/2010    - scheduled with Dr. Varma     BIOPSY LIVER  2019    COLONOSCOPY  2006    COLONOSCOPY N/A 12/23/2014    Procedure: COMBINED COLONOSCOPY, SINGLE OR MULTIPLE BIOPSY/POLYPECTOMY BY BIOPSY;  Surgeon: Diane Fleming MD;   Location:  GI    COLONOSCOPY N/A 12/5/2019    Procedure: COLONOSCOPY, WITH POLYPECTOMY AND BIOPSY;  Surgeon: Farhan Schilling MD;  Location: [facility] GI    COLONOSCOPY N/A 2/27/2024    Procedure: COLONOSCOPY, WITH POLYPECTOMY AND BIOPSY;  Surgeon: Michelle Diaz MD;  Location: [facility] OR    ENDOSCOPIC RETROGRADE CHOLANGIOPANCREATOGRAM N/A 4/24/2020    Procedure: ENDOSCOPIC RETROGRADE CHOLANGIOPANCREATOGRAPHY WITH, sledge removal,sphincterotomy, stent in gallbladder and pancreatic duct stent, and balloon dilation;  Surgeon: Guru Isac Kraft MD;  Location: [facility] OR    ESOPHAGOSCOPY, GASTROSCOPY, DUODENOSCOPY (EGD), COMBINED N/A 12/5/2019    Procedure: ESOPHAGOGASTRODUODENOSCOPY (EGD);  Surgeon: Farhan Schilling MD;  Location: [facility] GI    ESOPHAGOSCOPY, GASTROSCOPY, DUODENOSCOPY (EGD), COMBINED N/A 5/11/2023    Procedure: Esophagoscopy, gastroscopy, duodenoscopy (EGD), combined;  Surgeon: Jeannette Cervantes MD;  Location: [facility] GI    EXAM UNDER ANESTHESIA ANUS N/A 3/28/2019    Procedure: EXAM UNDER ANESTHESIA ANUS;  Surgeon: Diane Fleming MD;  Location:  OR    EXAM UNDER ANESTHESIA ANUS N/A 2/26/2021    Procedure: EXAM UNDER ANESTHESIA OF ANUS, SETON PLACEMENT, EXCISION OF SKIN BRIDGE;  Surgeon: Avtar Nam MD;  [place] [facility] [place]    EXAM UNDER ANESTHESIA ANUS N/A 1/6/2023    Procedure: EXAM UNDER ANESTHESIA, ANUS, SETON EXCHANGE, partial fistulotomy;  Surgeon: Mary Dugan MD;  [place] [facility] [place]    HYSTERECTOMY, VAGINAL  2006    with Dr. Licha Zhou - with BSO for fibroids     IR GALLBLADDER DRAIN PLACEMENT  4/22/2020    IR SIRT (SELECTIVE INTERNAL RADIO THERAPY)  2/21/2023    IR VISCERAL ANGIOGRAM  2/21/2023    IR VISCERAL EMBOLIZATION  2/27/2023    LAPAROSCOPIC ABLATION LIVER TUMOR N/A 8/29/2023    Procedure: Diagnostic Laparoscopy, Laparoscopic microwave ablation of liver tumor intraoperative ultrasound of liver, lysis of adhesions 1 hour,;  Surgeon: Albino Saavedra  MD Song;  Location: [facility] OR    OPEN REDUCTION INTERNAL FIXATION ANKLE Left at age 27    plates and screws removed at age 36    Pelviscopy with removal of bilateral hydrosalpinges.  04/15/2010    ZZC APPENDECTOMY  at age 14       SOCIAL HISTORY:   Social History     Socioeconomic History    Marital status:      Spouse name: Albino    Number of children: 3    Years of education: 14   Occupational History    Occupation: unemployed   Tobacco Use    Smoking status: Never     Passive exposure: Never    Smokeless tobacco: Never   Vaping Use    Vaping status: Never Used   Substance and Sexual Activity    Alcohol use: Not Currently    Drug use: No    Sexual activity: Yes     Partners: Male     Birth control/protection: Post-menopausal     Comment: harper had vasectomy   Other Topics Concern     Service No    Blood Transfusions No    Caffeine Concern No     Comment: rarely drinks caffeine    Exercise Yes     Comment: does a lot of walking - 4x/week    Seat Belt Yes     Comment: always    Self-Exams Yes     Comment: SBE encouraged monthly    Parent/sibling w/ CABG, MI or angioplasty before 65F 55M? Yes   Social History Narrative    calcium - drinks 5-6 large glasses skim milk/day    flex sig/colonoscopy -at age 49    sun precautions - discussed    mammogram - needs every 2 years in her 30's, then yearly from then on    Td booster - 9/99 and 4/27/2010    pneumovax -at age 59    DEXA -when perimenopausal    stool hemoccults - every year after age 40    ASA- start at age 39    mulvitamin - encouraged     FAMILY HISTORY: No bleeding/clotting disorders nor problems with anesthesia.  ALLERGIES:   Allergies   Allergen Reactions    Fish Oil      Redness and itching around eye area only - went away when fish oil capsules stopped     Metronidazole      pain/itching    Ppd [Tuberculin Purified Protein Derivative]     Sulfa Antibiotics      hives    Terbinafine And Related      Lamisil = mild urticarial reaction      MEDICATIONS:  Current Facility-Administered Medications   Medication Dose Route Frequency Provider Last Rate Last Admin    acetaminophen (TYLENOL) tablet 975 mg  975 mg Oral Once Darcie Newby PA-C        albumin human 5 % injection 50 g  50 g Irrigation Once Darcie Newby PA-C        aspirin (ASA) chewable tablet 81 mg  81 mg Oral or Feeding Tube Daily Isaac Vargas MD        calcium chloride 1 g in sodium chloride 0.9 % 100 mL intermittent infusion  1 g Intravenous Q1H PRN Darcie Newby PA-C   1 g at 06/22/24 1103    dapsone (ACZONE) tablet 100 mg  100 mg Oral or Feeding Tube Daily Isaac Vargas MD        dextrose 10% infusion   Intravenous Continuous PRN Nima Morrison PA-C        dextrose 5% and 0.45% NaCl infusion   Intravenous Continuous Isaac Vargas  mL/hr at 06/22/24 1422 New Bag at 06/22/24 1422    glucose gel 15-30 g  15-30 g Oral Q15 Min PRN Nima Morrison PA-C        Or    dextrose 50 % injection 25-50 mL  25-50 mL Intravenous Q15 Min PRN Nima Morrison PA-C        Or    glucagon injection 1 mg  1 mg Subcutaneous Q15 Min PRN Nima Morrison PA-C        [START ON 6/23/2024] fluconazole (DIFLUCAN) intermittent infusion 400 mg  400 mg Intravenous Once Isaac Vargas MD        hydrALAZINE (APRESOLINE) injection 20 mg  20 mg Intravenous Q4H PRN Jeb Blackwell MD        HYDROmorphone (DILAUDID) injection 0.2-0.4 mg  0.2-0.4 mg Intravenous Q1H PRN Jeb Blackwell MD   0.2 mg at 06/22/24 1443    insulin regular (MYXREDLIN) 1 unit/mL infusion  0-24 Units/hr Intravenous Continuous Nima Morrison PA-C 2 mL/hr at 06/22/24 1423 2 Units/hr at 06/22/24 1423    labetalol (NORMODYNE/TRANDATE) injection 20 mg  20 mg Intravenous Q4H PRN Jeb Blackwell MD        lidocaine (LMX4) cream   Topical Q1H PRN Darcie Newby PA-C        lidocaine 1 % 0.1-1 mL  0.1-1 mL Other Q1H PRN Darcie Newby PA-C        [START ON 6/23/2024] methylPREDNISolone sodium succinate  (solu-MEDROL) 200 mg in sodium chloride 0.9 % 58.2 mL intermittent infusion  200 mg Intravenous Once Isaac Vargas MD        Followed by    [START ON 6/24/2024] methylPREDNISolone sodium succinate (solu-MEDROL) injection 100 mg  100 mg Intravenous Once Isaac Vargas MD        Followed by    [START ON 6/25/2024] methylPREDNISolone sodium succinate (solu-MEDROL) injection 50 mg  50 mg Intravenous Once Isaac Vargas MD        Followed by    [START ON 6/26/2024] predniSONE (DELTASONE) tablet 25 mg  25 mg Oral or Feeding Tube Once Isaac Vargas MD        Followed by    [START ON 6/27/2024] predniSONE (DELTASONE) tablet 10 mg  10 mg Oral or Feeding Tube Once Isaac Vargas MD        mycophenolate (GENERIC EQUIVALENT) capsule 750 mg  750 mg Oral BID IS Isaac Vargas MD        Or    mycophenolate (CELLCEPT BRAND) suspension 750 mg  750 mg Oral or NG Tube BID IS Isaac Vargas MD        naloxone (NARCAN) injection 0.2 mg  0.2 mg Intravenous Q2 Min PRN Pravin Ball MD        Or    naloxone (NARCAN) injection 0.4 mg  0.4 mg Intravenous Q2 Min PRN Pravin Ball MD        Or    naloxone (NARCAN) injection 0.2 mg  0.2 mg Intramuscular Q2 Min PRN Pravin Ball MD        Or    naloxone (NARCAN) injection 0.4 mg  0.4 mg Intramuscular Q2 Min PRN Pravin Ball MD        ondansetron (ZOFRAN ODT) ODT tab 4 mg  4 mg Oral Q6H PRN Jeb Blackwell MD        Or    ondansetron (ZOFRAN) injection 4 mg  4 mg Intravenous Q6H PRN Jeb Blackwell MD        oxyCODONE (ROXICODONE) tablet 5-10 mg  5-10 mg Oral or Feeding Tube Q4H PRN Nima Morrison PA-C        [START ON 6/23/2024] pantoprazole (PROTONIX) IV push injection 40 mg  40 mg Intravenous Daily with breakfast Jeb Blackwell MD        piperacillin-tazobactam (ZOSYN) 3.375 g vial to attach to  mL bag  3.375 g Intravenous Q6H Isaac Vargas MD        piperacillin-tazobactam (ZOSYN) 3.375 g vial to attach to  mL bag   3.375 g Intravenous Q2H PRN Darcie Newby PA-C        prochlorperazine (COMPAZINE) injection 10 mg  10 mg Intravenous Q6H PRN Jeb Blackwell MD        Or    prochlorperazine (COMPAZINE) tablet 10 mg  10 mg Oral Q6H PRN Jeb Blackwell MD        Or    prochlorperazine (COMPAZINE) suppository 25 mg  25 mg Rectal Q12H PRN Jeb Blackwell MD        Reason beta blocker order not selected   Does not apply DOES NOT GO TO MAR Isaac Vargas MD        sodium chloride (PF) 0.9% PF flush 3 mL  3 mL Intravenous Q1H PRN Isaac Vargas MD        sodium chloride (PF) 0.9% PF flush 3 mL  3 mL Intravenous Q8H Isaac Vargas MD        sodium chloride (PF) 0.9% PF flush 3 mL  3 mL Intravenous Q1H PRN sIaac Vargas MD        sodium chloride (PF) 0.9% PF flush 3 mL  3 mL Intracatheter Q8H Darcie Newby PA-C   3 mL at 06/21/24 1957    sodium chloride (PF) 0.9% PF flush 3 mL  3 mL Intracatheter q1 min prn Darcie Newby PA-C        sodium chloride 0.45 % 1,000 mL infusion   Intravenous Continuous PRN Isaac Vargas MD        tacrolimus (GENERIC EQUIVALENT) capsule 2 mg  2 mg Oral BID IS Isaac Vargas MD        Or    tacrolimus (GENERIC) suspension 2 mg  2 mg Oral or NG Tube BID IS Isaac Vargas MD        ursodiol (ACTIGALL) capsule 300 mg  300 mg Oral or Feeding Tube BID Isaac Vargas MD        Or    ursodiol (ACTIGALL) suspension 300 mg  300 mg Oral or NG Tube BID Isaac Vargas MD        valGANciclovir (VALCYTE) tablet 450 mg  450 mg Oral Daily Isaac Vargas MD        Or    valGANciclovir (VALCYTE) solution 450 mg  450 mg Oral or NG Tube Daily Isaac Vargas MD         Facility-Administered Medications Ordered in Other Encounters   Medication Dose Route Frequency Provider Last Rate Last Admin    BREEZA Lemon-Lime flavored oral liquid for Neutral Abdominal/Pelvic Imaging 1,000 mL  1,000 mL Oral Once Farhan Schilling MD        hyoscyamine (LEVSIN/SL) sublingual tablet 125 mcg   125 mcg Sublingual Q4H Farhan uDbon MD         PHYSICAL EXAMINATION:  Temp:  [98.1  F (36.7  C)-98.6  F (37  C)] 98.1  F (36.7  C)  Pulse:  [81-82] 81  Resp:  [16] 16  BP: (116)/(68) 116/68  SpO2:  [96 %-100 %] 96 %  General: laying in bed, arouses to normal tone of voice, appropriately recovering from anesthesia   HEENT: PERRLA. OP clear. Tongue midline. Right MAC present with hands free via introducer. Left neck covered  Neuro: A&Ox3, NAD. Follows simple commands   Pulm/Resp: Clear breath sounds bilaterally without rhonchi, crackles or wheeze, breathing non-labored.   CV: RRR,   Abdomen: bilateral subcostal incision present, incision dressed. JACEK x1 in right lower quadrant--serosanguinous drainage   : (+) maxwell catheter in place, urine yellow and fabien  Incisions/Skin: skin warm and dry, no rashes   MSK/Extremities: no peripheral edema, moving all extremities, peripheral pulses intact, calves soft and compressible,  extremities well perfused pulses 2+     LABS: Reviewed.   Arterial Blood Gases   Recent Labs   Lab 06/22/24  1358 06/22/24  0821   PH 7.28* 7.40   PCO2 39 35   PO2 96 133*   HCO3 19* 22     Complete Blood Count   Recent Labs   Lab 06/22/24  1356 06/22/24  1131 06/22/24  0821 06/20/24  1651   WBC 8.2  8.2  --   --  5.0   HGB 8.7*  8.7*  --  14.0 13.8   PLT 98*  98* 91*  --  119*     Basic Metabolic Panel  Recent Labs   Lab 06/22/24  0821 06/20/24  1651    134*   POTASSIUM 4.1 4.0   CHLORIDE  --  102   CO2  --  23   BUN  --  11.0   CR  --  0.77   * 101*     Liver Function Tests  Recent Labs   Lab 06/22/24  1355 06/22/24  1131 06/20/24  1651   AST  --   --  71*   ALT  --   --  37   ALKPHOS  --   --  112   BILITOTAL  --   --  1.8*   ALBUMIN  --   --  3.7   INR 2.17* 3.43* 1.24*     Pancreatic Enzymes  Recent Labs   Lab 06/20/24  1651   AMYLASE 124*     Coagulation Profile  Recent Labs   Lab 06/22/24  1355 06/22/24  1131 06/20/24  1651   INR 2.17* 3.43* 1.24*   PTT 45* 72* 37        IMAGING:  Recent Results (from the past 24 hour(s))   JAYSON with Report    Narrative    Levon Pitts MD     6/22/2024  2:06 PM  Perioperative JAYSON Procedure Note    Staff -        Anesthesiologist:  Levon Pitts MD       Resident/Fellow: Chris Darling MD       Performed By: anesthesiologist and resident      JAYSON Probe Insertion    Probe Status PRE Insertion: NO obvious damage  Probe type:  Adult 3D  Bite block used:   Soft  Insertion Technique: Easy, no oropharyngeal manipulation  Insertion complications: None obvious  Billing Report:JAYSON report by Anesthesiologist (See Separate Report note)  Probe Status POST Removal: NO obvious damage    JAYSON Report  General Procedure Information  Images for this study have been archived.    Post Intervention Findings  Procedure(s) performed:  Other (see comments) (liver tx).  Regional wall   motion:. Surgeon(s) notified of all postintervention findings: Yes.                   Echocardiogram Comments  Echocardiogram comments: Normal RV size and systolic function based on   visual assessment. Trace TR. RV systolic pressure  + CVP based on TR jet.   LV hypertrophy. Normal LV size and systolic function by visual assessment.   Normal mitral valve. Aortic valve was tricuspid, competent and without   stenosis. Normal ascending and descending aorta. No aortic dissection. No   pericardial effusion. .   XR Chest Port 1 View    Impression    RESIDENT PRELIMINARY INTERPRETATION  Impression:   1. Right IJ central venous catheter tip is within the SVC.  2. No acute cardiac pulmonary process. Basilar atelectasis is noted

## 2024-06-22 NOTE — ANESTHESIA PROCEDURE NOTES
Arterial Line Procedure Note    Pre-Procedure   Staff -        Anesthesiologist:  Levon Pitts MD       Resident/Fellow: Zena Reyes MD       Performed By: with residents and anesthesiologist       Procedure performed by resident/fellow/CRNA in presence of a teaching physician.         Location: OR       Pre-Anesthestic Checklist: patient identified, IV checked, risks and benefits discussed, informed consent, monitors and equipment checked, pre-op evaluation and at physician/surgeon's request  Timeout:       Correct Patient: Yes        Correct Procedure: Yes        Correct Site: Yes        Correct Position: Yes   Line Placement:   This line was placed Post Induction  Procedure   Procedure: arterial line       Laterality: right       Insertion Site: brachial.  Sterile Prep        Standard elements of sterile barrier followed       Skin prep: Chloraprep  Insertion/Injection        Technique: ultrasound guided        1. Ultrasound was used to evaluate the access site.       2. Artery evaluated via ultrasound for patency/adequacy.       3. Using real-time ultrasound the needle/catheter was observed entering the artery/vein.       Catheter Type/Size: 20 G, 12 cm  Narrative         Secured by: suture       Tegaderm dressing used.       Complications: None apparent,        Arterial waveform: Yes        IBP within 10% of NIBP: Yes   Comments:  Note left brachial michela placed, but subsequently was pulled out. Hematoma. Held pressure. Good radial pulse, will continue to monitor. Moved to right arm, see above note.

## 2024-06-22 NOTE — ANESTHESIA POSTPROCEDURE EVALUATION
Patient: Abiola Matute    Procedure: Procedure(s):  Transplant liver recipient  donor       Anesthesia Type:  No value filed.    Note:  Disposition: Inpatient; ICU            ICU Sign Out: Anesthesiologist/ICU physician sign out WAS performed   Postop Pain Control: Uneventful            Sign Out: Well controlled pain   PONV: No   Neuro/Psych: Uneventful            Sign Out: Acceptable/Baseline neuro status   Airway/Respiratory: Uneventful            Sign Out: Acceptable/Baseline resp. status   CV/Hemodynamics: Uneventful            Sign Out: Acceptable CV status; No obvious hypovolemia; No obvious fluid overload   Other NRE: NONE   DID A NON-ROUTINE EVENT OCCUR? No    Event details/Postop Comments:  8FFP, 4pRBC, 1plt, 1000kcentra, 1g fibryga. EBL 1800. JAYSON showed normal RV and LV function without significant valvular disease. RVSP 28+CVP (12) based on echo. Adah was removed due to significant ectopy (sustained SVT that resolved prior to cardioversion). Uneventful operative course. Off pressors. Extubated and comfortable.     Resolving left upper arm hematoma from arterial line placement (dislodged shortly after placement). Good distal pulse and cap refill.     Left subclavian was attempted, unable to thread wire. Follow-up chest xray.            Last vitals:  Vitals:    24 0526 24 1700 24 0156   BP: 106/67 116/68 116/68   Pulse: 72 82 81   Resp: 16 16 16   Temp: 36.7  C (98.1  F) 37  C (98.6  F) 36.7  C (98.1  F)   SpO2: 98% 100% 96%       Electronically Signed By: Levon Pitts MD  2024  2:09 PM

## 2024-06-22 NOTE — TELEPHONE ENCOUNTER
Organ Offer Encounter Information    Organ Offer Information  Organ offer date & time: 6/19/2024 10:45 PM  Coordinator/Fellow/Attending name: Ruth Noe RN   Organ(s):  Organ UNOS ID Match Run ID Comment Organ Laterality   Liver [id number] MNOP         Recent infections?: No      New medications?: Yes (Comment: weight loss medication) Recent pregnancy?: No     Angicoagulation medications?: No Recent vaccinations?: No     Recent blood transfusions?: No Recent hospitalizations?: No   Has your insurance changed in the last 6-12 months?: Neg    Discussed organ offer with: Patient  Patient/Caregiver name: [place]  Discussed risk category with Patient/Other: DCD  Understood donor criteria, verbalized understanding  Patient/Other asked to speak to a surgeon?: No  Discussed program-specific outcomes: Asked questions regarding SRTR, verbalized understanding  Right to decline organ offer without penalty, Patient/Other: Aware of option to decline without penalty  Organ offer decision status Patient/Other: Accepted Offer  Organ disposition: Transplanted  Additional Comments: 6/20/24, 0862  Liver: Primary, local  Called patient to discuss primary liver offer with patient and her . Health history reviewed. DCD offer reviewed in detail. Patient requests time to discuss with her . They also requested to speak with their transplant coordinator. Message sent to assigned coordinator, Fabio.   MD: Dr. Ball  OPO Contact: [facility]   Donor/Recip HCV Status: Negative/Negative  Donor Nutritional Status: NS @ 75cc/hr, no dextrose  Local or Import Donor: Local  Using [facility] OCS: Yes  Plan (NPO, Donor OR): Donor OR not set at this time, no need for NPO or admission, will wait for donor OR and then call patient with plan for admission. Called [facility] to get the case on their radar.   - - -   COVID Screening  In the past month, have you:  Or anyone close to you had a positive COVID test or suspected to have COVID:  No  Had any COVID symptoms (Fever, Cough, Short of Breath, Loss of Taste/Smell, Rash): No    Call [facility] OCS National Service Hotline ([phone])  Time of [facility] OCS Referral Call: 2310   [facility] Coordinator/Telephone Number: Charles.   E-Mail Donor Summary: 2317 - E-mailed to Charles  OPO Coordinator Notified: 2300 - [facility] notified  AO Notified: 0918, page sent to Jens Enrique  - - -   [place] Contracted to Arrange Transportation     Call [facility] ([phone]): [phone], Jennifer  Organ or Specimen ([facility] sets up KP/KI): Liver  Donor Hospital Location: [place]  Donor OR Time: 1000  Team or Organ Only: Organ & [facility] team  Special Equipment (OCS, Pumps): OCS yes  [facility] SRC (See Call Schedule): NA, OCS    Admissions: 0742, Tez. Will update with ETA  Unit: 08Kell Jones  Immunology: 0908, Mynor  Inpatient Lab (COVID Testing [phone], Option 2): RN to confirm STAT run  [facility] ([phone]): NA  Vessel Storage Confirmation (PA/KP/LI): yes, ok to bank  Blood Bank: 0911, Hossein  Add Organ: 0908, organ added    Donor OR Time: 6/21, 1000  Procuring MD: [facility]  Contact in the OR: TBD  Organs Being Procured: HR/LI/KI  Expected Delays: none  Flush Solution: UW  Biopsy: requested  Pump: OCS  Special Requests (Special blood tubes, nodes, waivers-XM and/or anatomical):  none  MD for Visualization: Quinn  Transportation Details: per OCS    2:22 PM  Notified by 7a bed was available. Patient called and admission instructions provided for 1600 admit. Verónica NP updated with ETA.   Update 7a/admission with ETA: Kell Marin. ETA 1600  [facility]/ABO Verification: 1426, labels printed. Confirmed receipt  Book OR: [email or web address]. OR booked for 6/21 at 1700  Update Provider Entering Orders (XM Plan & COVID Testing):  0938Verónica RN    [place] Trip Number: [id number]   Location: [facility]  Tail Number: [id number]  Ground  Time in [place]: 20  Flight Time: 1hr 20 mn  Ground Time at OPO: 20  Latest Wheels Up: 1650  Cori Centeno RN    6/21/2024 12:36 PM:   Dr. Ball is accepting the liver pending OCS numbers.  Ruth Noe RN  Transplant Coordinator    6/21/2024 2:19 PM:   Per Dr. Ball will push recipient OR until 0600, notified Roya in the OR, Lori Sanches NP on 7A & [facility] team.   Ruth Noe RN  Transplant Coordinator      Attestation I have discussed all of the above with the Patient/Legal Guardian/Caregiver regarding this organ offer.: Yes  Coordinator/Fellow/Attending name: Ruth Noe RN

## 2024-06-22 NOTE — ADDENDUM NOTE
Addendum  created 06/22/24 1436 by Levon Pitts MD    Clinical Note Signed, Intraprocedure Event edited

## 2024-06-22 NOTE — ANESTHESIA CARE TRANSFER NOTE
Patient: Abiola Matute    Procedure: Procedure(s):  Transplant liver recipient  donor       Diagnosis: End stage liver disease (H) [K72.10]  Diagnosis Additional Information: No value filed.    Anesthesia Type:   No value filed.     Note:    Oropharynx: oropharynx clear of all foreign objects and spontaneously breathing  Level of Consciousness: awake  Oxygen Supplementation: face mask  Level of Supplemental Oxygen (L/min / FiO2): 6  Independent Airway: airway patency satisfactory and stable  Dentition: dentition unchanged  Vital Signs Stable: post-procedure vital signs reviewed and stable  Report to RN Given: handoff report given  Patient transferred to: ICU    ICU Handoff: Call for PAUSE to initiate/utilize ICU HANDOFF, Identified Patient, Identified Responsible Provider, Reviewed the Pertinent Medical History, Discussed Surgical Course, Reviewed Intra-OP Anesthesia Management and Issues during Anesthesia, Set Expectations for Post Procedure Period and Allowed Opportunity for Questions and Acknowledgement of Understanding      Vitals:  Vitals Value Taken Time   BP     Temp     Pulse 105 24 1352   Resp     SpO2 98 % 24 1352   Vitals shown include unfiled device data.    Electronically Signed By: Chris Darling MD  2024  1:52 PM

## 2024-06-23 ENCOUNTER — APPOINTMENT (OUTPATIENT)
Dept: GENERAL RADIOLOGY | Facility: CLINIC | Age: 59
DRG: 006 | End: 2024-06-23
Attending: TRANSPLANT SURGERY
Payer: MEDICARE

## 2024-06-23 ENCOUNTER — APPOINTMENT (OUTPATIENT)
Dept: OCCUPATIONAL THERAPY | Facility: CLINIC | Age: 59
DRG: 006 | End: 2024-06-23
Attending: PHYSICIAN ASSISTANT
Payer: MEDICARE

## 2024-06-23 ENCOUNTER — APPOINTMENT (OUTPATIENT)
Dept: ULTRASOUND IMAGING | Facility: CLINIC | Age: 59
DRG: 006 | End: 2024-06-23
Attending: NURSE PRACTITIONER
Payer: MEDICARE

## 2024-06-23 ENCOUNTER — APPOINTMENT (OUTPATIENT)
Dept: PHYSICAL THERAPY | Facility: CLINIC | Age: 59
DRG: 006 | End: 2024-06-23
Attending: PHYSICIAN ASSISTANT
Payer: MEDICARE

## 2024-06-23 LAB
ALBUMIN SERPL BCG-MCNC: 3.7 G/DL (ref 3.5–5.2)
ALBUMIN SERPL BCG-MCNC: 3.8 G/DL (ref 3.5–5.2)
ALP SERPL-CCNC: 88 U/L (ref 40–150)
ALP SERPL-CCNC: 98 U/L (ref 40–150)
ALT SERPL W P-5'-P-CCNC: 1199 U/L (ref 0–50)
ALT SERPL W P-5'-P-CCNC: 922 U/L (ref 0–50)
AMYLASE SERPL-CCNC: 114 U/L (ref 28–100)
ANION GAP SERPL CALCULATED.3IONS-SCNC: 10 MMOL/L (ref 7–15)
ANION GAP SERPL CALCULATED.3IONS-SCNC: 10 MMOL/L (ref 7–15)
ANION GAP SERPL CALCULATED.3IONS-SCNC: 8 MMOL/L (ref 7–15)
AST SERPL W P-5'-P-CCNC: 2477 U/L (ref 0–45)
AST SERPL W P-5'-P-CCNC: 7100 U/L (ref 0–45)
BASOPHILS # BLD AUTO: 0 10E3/UL (ref 0–0.2)
BASOPHILS # BLD AUTO: 0 10E3/UL (ref 0–0.2)
BASOPHILS NFR BLD AUTO: 0 %
BASOPHILS NFR BLD AUTO: 0 %
BILIRUB DIRECT SERPL-MCNC: 2.37 MG/DL (ref 0–0.3)
BILIRUB DIRECT SERPL-MCNC: 2.52 MG/DL (ref 0–0.3)
BILIRUB SERPL-MCNC: 6.7 MG/DL
BILIRUB SERPL-MCNC: 7.9 MG/DL
BLD PROD TYP BPU: NORMAL
BLD PROD TYP BPU: NORMAL
BLOOD COMPONENT TYPE: NORMAL
BLOOD COMPONENT TYPE: NORMAL
BUN SERPL-MCNC: 13.2 MG/DL (ref 8–23)
BUN SERPL-MCNC: 14.4 MG/DL (ref 8–23)
BUN SERPL-MCNC: 16.9 MG/DL (ref 8–23)
CA-I BLD-MCNC: 4.9 MG/DL (ref 4.4–5.2)
CALCIUM SERPL-MCNC: 8.3 MG/DL (ref 8.6–10)
CALCIUM SERPL-MCNC: 8.4 MG/DL (ref 8.6–10)
CALCIUM SERPL-MCNC: 8.7 MG/DL (ref 8.6–10)
CHLORIDE SERPL-SCNC: 104 MMOL/L (ref 98–107)
CHLORIDE SERPL-SCNC: 105 MMOL/L (ref 98–107)
CHLORIDE SERPL-SCNC: 107 MMOL/L (ref 98–107)
CODING SYSTEM: NORMAL
CODING SYSTEM: NORMAL
CREAT SERPL-MCNC: 0.6 MG/DL (ref 0.51–0.95)
CREAT SERPL-MCNC: 0.64 MG/DL (ref 0.51–0.95)
CREAT SERPL-MCNC: 0.65 MG/DL (ref 0.51–0.95)
DEPRECATED HCO3 PLAS-SCNC: 22 MMOL/L (ref 22–29)
DEPRECATED HCO3 PLAS-SCNC: 22 MMOL/L (ref 22–29)
DEPRECATED HCO3 PLAS-SCNC: 23 MMOL/L (ref 22–29)
EGFRCR SERPLBLD CKD-EPI 2021: >90 ML/MIN/1.73M2
EOSINOPHIL # BLD AUTO: 0 10E3/UL (ref 0–0.7)
EOSINOPHIL # BLD AUTO: 0 10E3/UL (ref 0–0.7)
EOSINOPHIL NFR BLD AUTO: 0 %
EOSINOPHIL NFR BLD AUTO: 0 %
ERYTHROCYTE [DISTWIDTH] IN BLOOD BY AUTOMATED COUNT: 18 % (ref 10–15)
ERYTHROCYTE [DISTWIDTH] IN BLOOD BY AUTOMATED COUNT: 18.7 % (ref 10–15)
ERYTHROCYTE [DISTWIDTH] IN BLOOD BY AUTOMATED COUNT: 18.8 % (ref 10–15)
GLUCOSE BLDC GLUCOMTR-MCNC: 103 MG/DL (ref 70–99)
GLUCOSE BLDC GLUCOMTR-MCNC: 120 MG/DL (ref 70–99)
GLUCOSE BLDC GLUCOMTR-MCNC: 121 MG/DL (ref 70–99)
GLUCOSE BLDC GLUCOMTR-MCNC: 143 MG/DL (ref 70–99)
GLUCOSE BLDC GLUCOMTR-MCNC: 149 MG/DL (ref 70–99)
GLUCOSE BLDC GLUCOMTR-MCNC: 150 MG/DL (ref 70–99)
GLUCOSE BLDC GLUCOMTR-MCNC: 156 MG/DL (ref 70–99)
GLUCOSE BLDC GLUCOMTR-MCNC: 166 MG/DL (ref 70–99)
GLUCOSE BLDC GLUCOMTR-MCNC: 168 MG/DL (ref 70–99)
GLUCOSE BLDC GLUCOMTR-MCNC: 169 MG/DL (ref 70–99)
GLUCOSE BLDC GLUCOMTR-MCNC: 170 MG/DL (ref 70–99)
GLUCOSE BLDC GLUCOMTR-MCNC: 208 MG/DL (ref 70–99)
GLUCOSE BLDC GLUCOMTR-MCNC: 227 MG/DL (ref 70–99)
GLUCOSE SERPL-MCNC: 100 MG/DL (ref 70–99)
GLUCOSE SERPL-MCNC: 148 MG/DL (ref 70–99)
GLUCOSE SERPL-MCNC: 151 MG/DL (ref 70–99)
HCT VFR BLD AUTO: 23.3 % (ref 35–47)
HCT VFR BLD AUTO: 23.3 % (ref 35–47)
HCT VFR BLD AUTO: 24.4 % (ref 35–47)
HGB BLD-MCNC: 8.4 G/DL (ref 11.7–15.7)
HGB BLD-MCNC: 8.5 G/DL (ref 11.7–15.7)
HGB BLD-MCNC: 8.8 G/DL (ref 11.7–15.7)
IMM GRANULOCYTES # BLD: 0 10E3/UL
IMM GRANULOCYTES # BLD: 0.1 10E3/UL
IMM GRANULOCYTES NFR BLD: 0 %
IMM GRANULOCYTES NFR BLD: 1 %
INR PPP: 2.04 (ref 0.85–1.15)
INR PPP: 2.24 (ref 0.85–1.15)
ISSUE DATE AND TIME: NORMAL
ISSUE DATE AND TIME: NORMAL
LACTATE SERPL-SCNC: 2 MMOL/L (ref 0.7–2)
LIPASE SERPL-CCNC: 115 U/L (ref 13–60)
LYMPHOCYTES # BLD AUTO: 0.2 10E3/UL (ref 0.8–5.3)
LYMPHOCYTES # BLD AUTO: 0.3 10E3/UL (ref 0.8–5.3)
LYMPHOCYTES NFR BLD AUTO: 2 %
LYMPHOCYTES NFR BLD AUTO: 2 %
MAGNESIUM SERPL-MCNC: 1.8 MG/DL (ref 1.7–2.3)
MCH RBC QN AUTO: 32.2 PG (ref 26.5–33)
MCH RBC QN AUTO: 32.7 PG (ref 26.5–33)
MCH RBC QN AUTO: 32.8 PG (ref 26.5–33)
MCHC RBC AUTO-ENTMCNC: 36.1 G/DL (ref 31.5–36.5)
MCHC RBC AUTO-ENTMCNC: 36.1 G/DL (ref 31.5–36.5)
MCHC RBC AUTO-ENTMCNC: 36.5 G/DL (ref 31.5–36.5)
MCV RBC AUTO: 89 FL (ref 78–100)
MCV RBC AUTO: 90 FL (ref 78–100)
MCV RBC AUTO: 91 FL (ref 78–100)
MONOCYTES # BLD AUTO: 0.2 10E3/UL (ref 0–1.3)
MONOCYTES # BLD AUTO: 0.3 10E3/UL (ref 0–1.3)
MONOCYTES NFR BLD AUTO: 2 %
MONOCYTES NFR BLD AUTO: 3 %
NEUTROPHILS # BLD AUTO: 10.7 10E3/UL (ref 1.6–8.3)
NEUTROPHILS # BLD AUTO: 6.3 10E3/UL (ref 1.6–8.3)
NEUTROPHILS NFR BLD AUTO: 95 %
NEUTROPHILS NFR BLD AUTO: 95 %
NRBC # BLD AUTO: 0 10E3/UL
NRBC # BLD AUTO: 0 10E3/UL
NRBC BLD AUTO-RTO: 0 /100
NRBC BLD AUTO-RTO: 0 /100
PHOSPHATE SERPL-MCNC: 2.8 MG/DL (ref 2.5–4.5)
PLATELET # BLD AUTO: 50 10E3/UL (ref 150–450)
PLATELET # BLD AUTO: 55 10E3/UL (ref 150–450)
PLATELET # BLD AUTO: 75 10E3/UL (ref 150–450)
POTASSIUM SERPL-SCNC: 3.9 MMOL/L (ref 3.4–5.3)
POTASSIUM SERPL-SCNC: 4.3 MMOL/L (ref 3.4–5.3)
POTASSIUM SERPL-SCNC: 4.4 MMOL/L (ref 3.4–5.3)
PROT SERPL-MCNC: 5.8 G/DL (ref 6.4–8.3)
PROT SERPL-MCNC: 5.8 G/DL (ref 6.4–8.3)
RBC # BLD AUTO: 2.57 10E6/UL (ref 3.8–5.2)
RBC # BLD AUTO: 2.59 10E6/UL (ref 3.8–5.2)
RBC # BLD AUTO: 2.73 10E6/UL (ref 3.8–5.2)
SODIUM SERPL-SCNC: 135 MMOL/L (ref 135–145)
SODIUM SERPL-SCNC: 137 MMOL/L (ref 135–145)
SODIUM SERPL-SCNC: 139 MMOL/L (ref 135–145)
UNIT ABO/RH: NORMAL
UNIT ABO/RH: NORMAL
UNIT NUMBER: NORMAL
UNIT NUMBER: NORMAL
UNIT STATUS: NORMAL
UNIT STATUS: NORMAL
UNIT TYPE ISBT: 6200
UNIT TYPE ISBT: 6200
WBC # BLD AUTO: 10.1 10E3/UL (ref 4–11)
WBC # BLD AUTO: 11.3 10E3/UL (ref 4–11)
WBC # BLD AUTO: 6.7 10E3/UL (ref 4–11)

## 2024-06-23 PROCEDURE — 93975 VASCULAR STUDY: CPT | Mod: 26 | Performed by: RADIOLOGY

## 2024-06-23 PROCEDURE — 80048 BASIC METABOLIC PNL TOTAL CA: CPT | Performed by: NURSE PRACTITIONER

## 2024-06-23 PROCEDURE — 97530 THERAPEUTIC ACTIVITIES: CPT | Mod: GP

## 2024-06-23 PROCEDURE — 85025 COMPLETE CBC W/AUTO DIFF WBC: CPT | Performed by: SURGERY

## 2024-06-23 PROCEDURE — 83735 ASSAY OF MAGNESIUM: CPT | Performed by: TRANSPLANT SURGERY

## 2024-06-23 PROCEDURE — 250N000011 HC RX IP 250 OP 636: Performed by: SURGERY

## 2024-06-23 PROCEDURE — 36556 INSERT NON-TUNNEL CV CATH: CPT | Mod: 58 | Performed by: SURGERY

## 2024-06-23 PROCEDURE — 82150 ASSAY OF AMYLASE: CPT | Performed by: SURGERY

## 2024-06-23 PROCEDURE — C9113 INJ PANTOPRAZOLE SODIUM, VIA: HCPCS | Mod: JZ | Performed by: STUDENT IN AN ORGANIZED HEALTH CARE EDUCATION/TRAINING PROGRAM

## 2024-06-23 PROCEDURE — 250N000013 HC RX MED GY IP 250 OP 250 PS 637: Performed by: PHYSICIAN ASSISTANT

## 2024-06-23 PROCEDURE — 83605 ASSAY OF LACTIC ACID: CPT | Performed by: PHYSICIAN ASSISTANT

## 2024-06-23 PROCEDURE — 999N000065 XR CHEST PORT 1 VIEW

## 2024-06-23 PROCEDURE — 250N000011 HC RX IP 250 OP 636: Mod: JZ | Performed by: STUDENT IN AN ORGANIZED HEALTH CARE EDUCATION/TRAINING PROGRAM

## 2024-06-23 PROCEDURE — P9016 RBC LEUKOCYTES REDUCED: HCPCS | Performed by: STUDENT IN AN ORGANIZED HEALTH CARE EDUCATION/TRAINING PROGRAM

## 2024-06-23 PROCEDURE — 258N000003 HC RX IP 258 OP 636: Mod: JZ | Performed by: SURGERY

## 2024-06-23 PROCEDURE — 93975 VASCULAR STUDY: CPT

## 2024-06-23 PROCEDURE — 82248 BILIRUBIN DIRECT: CPT | Performed by: SURGERY

## 2024-06-23 PROCEDURE — 250N000011 HC RX IP 250 OP 636: Mod: JZ | Performed by: TRANSPLANT SURGERY

## 2024-06-23 PROCEDURE — 258N000003 HC RX IP 258 OP 636: Mod: JZ | Performed by: STUDENT IN AN ORGANIZED HEALTH CARE EDUCATION/TRAINING PROGRAM

## 2024-06-23 PROCEDURE — P9059 PLASMA, FRZ BETWEEN 8-24HOUR: HCPCS | Performed by: STUDENT IN AN ORGANIZED HEALTH CARE EDUCATION/TRAINING PROGRAM

## 2024-06-23 PROCEDURE — 97165 OT EVAL LOW COMPLEX 30 MIN: CPT | Mod: GO | Performed by: OCCUPATIONAL THERAPIST

## 2024-06-23 PROCEDURE — 97162 PT EVAL MOD COMPLEX 30 MIN: CPT | Mod: GP

## 2024-06-23 PROCEDURE — 82330 ASSAY OF CALCIUM: CPT | Performed by: SURGERY

## 2024-06-23 PROCEDURE — 250N000011 HC RX IP 250 OP 636: Performed by: STUDENT IN AN ORGANIZED HEALTH CARE EDUCATION/TRAINING PROGRAM

## 2024-06-23 PROCEDURE — 200N000002 HC R&B ICU UMMC

## 2024-06-23 PROCEDURE — 80048 BASIC METABOLIC PNL TOTAL CA: CPT | Performed by: SURGERY

## 2024-06-23 PROCEDURE — 250N000009 HC RX 250: Performed by: PHYSICIAN ASSISTANT

## 2024-06-23 PROCEDURE — 250N000013 HC RX MED GY IP 250 OP 250 PS 637: Performed by: NURSE PRACTITIONER

## 2024-06-23 PROCEDURE — 250N000012 HC RX MED GY IP 250 OP 636 PS 637: Performed by: SURGERY

## 2024-06-23 PROCEDURE — 97535 SELF CARE MNGMENT TRAINING: CPT | Mod: GO | Performed by: OCCUPATIONAL THERAPIST

## 2024-06-23 PROCEDURE — 85610 PROTHROMBIN TIME: CPT | Performed by: SURGERY

## 2024-06-23 PROCEDURE — 250N000013 HC RX MED GY IP 250 OP 250 PS 637: Performed by: SURGERY

## 2024-06-23 PROCEDURE — 82248 BILIRUBIN DIRECT: CPT | Performed by: NURSE PRACTITIONER

## 2024-06-23 PROCEDURE — 85025 COMPLETE CBC W/AUTO DIFF WBC: CPT | Performed by: NURSE PRACTITIONER

## 2024-06-23 PROCEDURE — 71045 X-RAY EXAM CHEST 1 VIEW: CPT | Mod: 26 | Performed by: RADIOLOGY

## 2024-06-23 PROCEDURE — P9012 CRYOPRECIPITATE EACH UNIT: HCPCS | Performed by: STUDENT IN AN ORGANIZED HEALTH CARE EDUCATION/TRAINING PROGRAM

## 2024-06-23 PROCEDURE — 99232 SBSQ HOSP IP/OBS MODERATE 35: CPT | Mod: 24 | Performed by: NURSE PRACTITIONER

## 2024-06-23 PROCEDURE — 84100 ASSAY OF PHOSPHORUS: CPT | Performed by: SURGERY

## 2024-06-23 PROCEDURE — 83690 ASSAY OF LIPASE: CPT | Performed by: SURGERY

## 2024-06-23 RX ORDER — NYSTATIN 100000/ML
500000 SUSPENSION, ORAL (FINAL DOSE FORM) ORAL 4 TIMES DAILY
Status: DISCONTINUED | OUTPATIENT
Start: 2024-06-24 | End: 2024-06-25

## 2024-06-23 RX ORDER — AMOXICILLIN 250 MG
1-2 CAPSULE ORAL 2 TIMES DAILY
Status: DISCONTINUED | OUTPATIENT
Start: 2024-06-23 | End: 2024-06-28 | Stop reason: HOSPADM

## 2024-06-23 RX ORDER — PANTOPRAZOLE SODIUM 40 MG/1
40 TABLET, DELAYED RELEASE ORAL
Status: DISCONTINUED | OUTPATIENT
Start: 2024-06-24 | End: 2024-06-25

## 2024-06-23 RX ORDER — MAGNESIUM SULFATE HEPTAHYDRATE 40 MG/ML
2 INJECTION, SOLUTION INTRAVENOUS ONCE
Status: COMPLETED | OUTPATIENT
Start: 2024-06-23 | End: 2024-06-23

## 2024-06-23 RX ORDER — POLYETHYLENE GLYCOL 3350 17 G/17G
17 POWDER, FOR SOLUTION ORAL DAILY
Status: DISCONTINUED | OUTPATIENT
Start: 2024-06-23 | End: 2024-06-24

## 2024-06-23 RX ADMIN — HYDROMORPHONE HYDROCHLORIDE 0.2 MG: 0.2 INJECTION, SOLUTION INTRAMUSCULAR; INTRAVENOUS; SUBCUTANEOUS at 06:22

## 2024-06-23 RX ADMIN — OXYCODONE HYDROCHLORIDE 10 MG: 5 TABLET ORAL at 21:23

## 2024-06-23 RX ADMIN — OXYCODONE HYDROCHLORIDE 10 MG: 5 TABLET ORAL at 03:35

## 2024-06-23 RX ADMIN — DAPSONE 100 MG: 100 TABLET ORAL at 07:55

## 2024-06-23 RX ADMIN — OXYCODONE HYDROCHLORIDE 10 MG: 5 TABLET ORAL at 12:46

## 2024-06-23 RX ADMIN — URSODIOL 300 MG: 300 CAPSULE ORAL at 20:36

## 2024-06-23 RX ADMIN — DOCUSATE SODIUM 50 MG AND SENNOSIDES 8.6 MG 1 TABLET: 8.6; 5 TABLET, FILM COATED ORAL at 20:36

## 2024-06-23 RX ADMIN — PANTOPRAZOLE SODIUM 40 MG: 40 INJECTION, POWDER, FOR SOLUTION INTRAVENOUS at 07:54

## 2024-06-23 RX ADMIN — TACROLIMUS 2 MG: 1 CAPSULE ORAL at 07:54

## 2024-06-23 RX ADMIN — INSULIN HUMAN 6 UNITS/HR: 1 INJECTION, SOLUTION INTRAVENOUS at 03:45

## 2024-06-23 RX ADMIN — SODIUM CHLORIDE 200 MG: 9 INJECTION, SOLUTION INTRAVENOUS at 10:04

## 2024-06-23 RX ADMIN — HYDRALAZINE HYDROCHLORIDE 20 MG: 20 INJECTION INTRAMUSCULAR; INTRAVENOUS at 04:35

## 2024-06-23 RX ADMIN — ASPIRIN 81 MG CHEWABLE TABLET 81 MG: 81 TABLET CHEWABLE at 07:55

## 2024-06-23 RX ADMIN — OXYCODONE HYDROCHLORIDE 5 MG: 5 TABLET ORAL at 17:12

## 2024-06-23 RX ADMIN — HYDROMORPHONE HYDROCHLORIDE 0.2 MG: 0.2 INJECTION, SOLUTION INTRAMUSCULAR; INTRAVENOUS; SUBCUTANEOUS at 20:36

## 2024-06-23 RX ADMIN — POLYETHYLENE GLYCOL 3350 17 G: 17 POWDER, FOR SOLUTION ORAL at 15:40

## 2024-06-23 RX ADMIN — PIPERACILLIN SODIUM AND TAZOBACTAM SODIUM 3.38 G: 3; .375 INJECTION, POWDER, LYOPHILIZED, FOR SOLUTION INTRAVENOUS at 15:24

## 2024-06-23 RX ADMIN — PIPERACILLIN SODIUM AND TAZOBACTAM SODIUM 3.38 G: 3; .375 INJECTION, POWDER, LYOPHILIZED, FOR SOLUTION INTRAVENOUS at 07:54

## 2024-06-23 RX ADMIN — MYCOPHENOLATE MOFETIL 750 MG: 250 CAPSULE ORAL at 07:54

## 2024-06-23 RX ADMIN — DEXTROSE MONOHYDRATE 0 MCG/KG/MIN: 50 INJECTION, SOLUTION INTRAVENOUS at 15:24

## 2024-06-23 RX ADMIN — MAGNESIUM SULFATE HEPTAHYDRATE 2 G: 2 INJECTION, SOLUTION INTRAVENOUS at 05:29

## 2024-06-23 RX ADMIN — PIPERACILLIN SODIUM AND TAZOBACTAM SODIUM 3.38 G: 3; .375 INJECTION, POWDER, LYOPHILIZED, FOR SOLUTION INTRAVENOUS at 02:34

## 2024-06-23 RX ADMIN — PIPERACILLIN SODIUM AND TAZOBACTAM SODIUM 3.38 G: 3; .375 INJECTION, POWDER, LYOPHILIZED, FOR SOLUTION INTRAVENOUS at 20:36

## 2024-06-23 RX ADMIN — LABETALOL HYDROCHLORIDE 10 MG: 5 INJECTION, SOLUTION INTRAVENOUS at 01:13

## 2024-06-23 RX ADMIN — URSODIOL 300 MG: 300 CAPSULE ORAL at 07:55

## 2024-06-23 RX ADMIN — MYCOPHENOLATE MOFETIL 750 MG: 250 CAPSULE ORAL at 17:12

## 2024-06-23 RX ADMIN — VALGANCICLOVIR HYDROCHLORIDE 450 MG: 450 TABLET ORAL at 07:54

## 2024-06-23 RX ADMIN — FLUCONAZOLE 400 MG: 2 INJECTION, SOLUTION INTRAVENOUS at 06:15

## 2024-06-23 RX ADMIN — TACROLIMUS 2 MG: 1 CAPSULE ORAL at 17:12

## 2024-06-23 RX ADMIN — OXYCODONE HYDROCHLORIDE 10 MG: 5 TABLET ORAL at 07:54

## 2024-06-23 ASSESSMENT — ACTIVITIES OF DAILY LIVING (ADL)
ADLS_ACUITY_SCORE: 32
ADLS_ACUITY_SCORE: 34
ADLS_ACUITY_SCORE: 35
ADLS_ACUITY_SCORE: 32
ADLS_ACUITY_SCORE: 34
ADLS_ACUITY_SCORE: 34
ADLS_ACUITY_SCORE: 35
ADLS_ACUITY_SCORE: 35
ADLS_ACUITY_SCORE: 32
ADLS_ACUITY_SCORE: 32
ADLS_ACUITY_SCORE: 35
ADLS_ACUITY_SCORE: 34
ADLS_ACUITY_SCORE: 32
ADLS_ACUITY_SCORE: 34
ADLS_ACUITY_SCORE: 35
ADLS_ACUITY_SCORE: 35
ADLS_ACUITY_SCORE: 34
ADLS_ACUITY_SCORE: 35
ADLS_ACUITY_SCORE: 32
ADLS_ACUITY_SCORE: 35

## 2024-06-23 NOTE — PROGRESS NOTES
SURGICAL ICU PROGRESS NOTE  06/23/2024      PRIMARY TEAM: Transplant Liver   PRIMARY PHYSICIAN: Dr. 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 Saavedra, thrombocytopenia (~100s), hyponatremia and ascites. PMH also significant for stable tiny pulmonary nodule (3mm) at right major fissure on last CT 7/7/23, osteopenia, LE cellulitis, crohn's disease on infliximab and obesity. Now s/p DDLT 6/21/24 with Dr. Ball.     Patient extubated post procedure. OR notable for left upper arm hematoma from dislodged arterial line and attempt at left subclavian CV Placement.    Changes Today  - Remove arterial line and RICC catheters   - CLD today   - stop MIVF Rate   - keep insulin gtt for now   - oral nystatin to start 6/24/24   - repeat US today   - Patient ready for transfer out of ICU, does not have needs that require ICU admission. Will discuss with transplant team    PLAN:  Neuro/ pain/ sedation:  # Anxiety   # Acute pain post-operative  - Monitor neurological status. Delirium preventions and precautions  - Pain regimen: PRN IV dilaudid and PO oxycodone.     Pulmonary care:   # no acute issues   - patient extubated post procedure  - CXR yesterday w/o pneumothorax s/p line attempt   - Supplemental oxygen to keep saturation above 92%, on Nasal cannula 4LPM.  - Incentive spirometer every 15-30 minutes when awake.    Cardiovascular:    # hx of hypertension   # peripheral edema   Intra-op JAYSON w/ normal RV and LV systolic fxn. LV hypertrophy.  - PRN hydralazine and labetalol for SBP >160  - hold pta spironolactone/lasix    - resuscitate with blood products as needed   - Monitor hemodynamic  status. MAP goal >65.     GI/Nutrition:   # Cirrhosis 2/2 DUNN /EtOH complicated by HCC with associated hyponatremia and ascites, now s/p DDLT 6/21/24   # Moderate Protein calorie deficit malnutrition  (Per RD note 6/21/24)   # Hypoalbuminemia  POD#1  Ast-7100 (5700)  Alt-1199 (1494)  Bili Total-7.9 (4)  Bili Dir-2.37  Hepatic US with patent Doppler yesterday. Repeat today pending.  - Daily hepatic panel  - PRN Zofran  - Hold PTA lactulose and rifaximin  - Ursodiol per transplant  - Aspirin 81 mg  - Clear liquid diet currently, advance as tolerated     #Chron's disease  - PTA Infliximab, most recent dose 6/12/24. No need for additional injection at this time.    Renal/Fluids/ Electrolytes:   # Hyponatremia, resolved  Lytes wnl  - Stop mIVF, tolerating orals  - Continue to hold PTA lasix with 0.9 ml/kg/kr this AM. Appropriate UOP.  - Electrolyte management protocol, replace lytes as able.      # Lactic acidosis, resolved  - Lactate 2 this AM    Endocrine:    # Hyperglycemia due to steroids and critical illness   - Insulin gtt. Goal to keep BG< 180 for optimal wound healing.  - Hypoglycemia protocol in place    ID/ Antibiotics:  # Immunosuppression   # Immunoprophylaxis   - WBC 6.7 this am.   - Immunosuppression: Tacrolimus/MMF and steroid taper  - Prophylaxis: Fluconazole given pre-op, Zosyn x48 hours, Dapsone, Valcyte per transplant team  - Nystatin for thrush ppx starting 6/24  - Monitor for fever and trend WBC count  Cultures 6/22:  NGTD    Heme:     # Acute blood loss anemia   # coagulopathy due to blood loss   # thrombocytopenia  Hbg 8.8, plt 50, INR 2.24, fibro - 173  - 1u of FFP ordered this morning  - serial labs and monitoring.    Repletion goals  Hgb >8   Plt >50  Fibrinogen >200  INR <2    Musculoskeletal:  # Osteopenia   # Osteoarthritis   # Weakness and deconditioning of critical illness   - Physical and occupational therapy consult    General Cares/Prophylaxis:    DVT Prophylaxis: chemical DVT  prophylaxis pending transplant surgery plan, cont SCDs   GI Prophylaxis: PPI  Restraints: Restraints for medical healing needed: NO    Lines/ tubes/ drains:  - Right internal jugular MAC   - PIV's  - Right RICC line, remove  - Arterial line-- Right brachial, remove  - Conti out  - JACEK drain x 1    Disposition:  - Surgical ICU.    Patient seen and discussed with staff, Dr. Rivera.    Fer Chi, MS4  [employer] [facility] [facility] [employer]    I evaluated the patient and agree with this note.    Fern Cat, DO  General Surgery, PGY-1     ====================================    TODAY'S SUBJECTIVE/INTERVAL HISTORY:   Patient doing well this morning. Reports pain 6 out of 10 this AM. Pain worst to RUQ. Denies any other symptoms this AM. Tolerating oral fluids well.     OBJECTIVE:     Temp:  [97.6  F (36.4  C)-98.8  F (37.1  C)] 97.6  F (36.4  C)  Pulse:  [] 89  Resp:  [12-16] 12  MAP:  [84 mmHg-119 mmHg] 89 mmHg  Arterial Line BP: (125-166)/(61-85) 129/62  SpO2:  [93 %-99 %] 96 %  Resp: 12      I/O last 3 completed shifts:  In: 54358.16 [P.O.:90; I.V.:5582.16; IV Piggyback:500]  Out: 2085 [Urine:1925; Drains:160]    General/Neuro: Well appearing, calm, no acute distress. CN II-XII grossly intact, no focal deficits  Pulm: No acute respiratory distress on NC   CV: RRR by peripheral pulse and on tele   Abd: soft, nondistended, +TTP in RUQ but no guarding and non-peritontic   Extremities: no edema, DP pulses 2+ b/l  Skin: warm and well-perfused.     LABS:   Arterial Blood Gases   Recent Labs   Lab 06/22/24  1358 06/22/24  0821   PH 7.28* 7.40   PCO2 39 35   PO2 96 133*   HCO3 19* 22     Complete Blood Count   Recent Labs   Lab 06/23/24  0343 06/22/24  2201 06/22/24  1815 06/22/24  1356 06/22/24  1131 06/22/24  0821 06/20/24  1651   WBC 6.7  --  8.1 8.2  8.2  --   --  5.0   HGB 8.8* 7.6* 8.6* 8.7*  8.7*  --    < > 13.8   PLT 50*  --  92* 98*  98* 91*  --  119*    < > = values in this interval not displayed.      Basic Metabolic Panel  Recent Labs   Lab 06/23/24  0811 06/23/24  0703 06/23/24  0538 06/23/24  0343 06/22/24  2010 06/22/24  1949 06/22/24  1906 06/22/24 1815 06/22/24  1517 06/22/24  1355 06/22/24  0821 06/20/24  1651   NA  --   --   --  139  --   --   --  139  --  143  143 136 134*   POTASSIUM  --   --   --  4.4  --  3.8  --  4.2  --  3.4  3.4 4.1 4.0   CHLORIDE  --   --   --  107  --   --   --  106  --  108*  108*  --  102   CO2  --   --   --  22  --   --   --  18*  --  17*  17*  --  23   BUN  --   --   --  13.2  --   --   --  11.7  --  9.2  9.2  --  11.0   CR  --   --   --  0.64  --   --   --  0.62  --  0.53  0.53  --  0.77   * 227* 149* 148*   < >  --    < > 300*   < > 213*  213* 120* 101*    < > = values in this interval not displayed.     Liver Function Tests  Recent Labs   Lab 06/23/24 0343 06/22/24 1815 06/22/24 1355 06/22/24  1131 06/20/24  1651   AST 7,100*  --  5,788*  5,788*  --  71*   ALT 1,199*  --  1,494*  1,494*  --  37   ALKPHOS 88  --  83  83  --  112   BILITOTAL 7.9*  --  4.0*  4.0*  --  1.8*   ALBUMIN 3.8  --  2.8*  2.8*  --  3.7   INR 2.24* 1.84* 2.17* 3.43* 1.24*     Pancreatic Enzymes  Recent Labs   Lab 06/23/24 0343 06/20/24  1651   LIPASE 115*  --    AMYLASE 114* 124*     Coagulation Profile  Recent Labs   Lab 06/23/24 0343 06/22/24 1815 06/22/24 1355 06/22/24  1131 06/20/24  1651   INR 2.24* 1.84* 2.17* 3.43* 1.24*   PTT  --   --  45* 72* 37         IMAGING:   Recent Results (from the past 24 hour(s))   XR Chest Port 1 View    Narrative    Exam: XR CHEST PORT 1 VIEW, 6/22/2024 2:34 PM    Indication: eval central line    Comparison: CT 6/20/2024. Chest x-ray 6/20/2024    Findings:   Right IJ central venous catheter with tip terminating in the high SVC.  The cardiac silhouette size is stable. Borderline low lung volumes.  Mild bibasilar streaky opacities, favors atelectasis. No pneumothorax  or pleural effusion. Hemidiaphragms are symmetric. Upper abdomen  is  unremarkable. No acute osseous mallet.      Impression    Impression:   1. Right IJ central venous catheter tip is within the SVC.  2. No acute cardiac pulmonary process. Basilar atelectasis is noted     I have personally reviewed the examination and initial interpretation  and I agree with the findings.    JARETT ARRINGTON MD         SYSTEM ID:  [id number]   US Liver Transplant    Narrative    EXAMINATION: US LIVER TRANSPLANT, 6/22/2024 4:51 PM     COMPARISON: None.    HISTORY: s/p liver transplant    TECHNIQUE:  Gray-scale, color Doppler and spectral flow analysis.    FINDINGS:   There is no ascites. Small right pleural effusion.    Liver:   The liver demonstrates normal homogeneous echotexture. No  evidence of a focal hepatic mass. Perihepatic fluid collection  measuring 5.0 x 3.0 x 5.4 cm.    Bile Ducts: No intrahepatic biliary dilatation. Common bile duct not  visualized.    Gallbladder: The gallbladder is surgically absent.    Kidneys:   Right kidney:  The right kidney demonstrates normal echotexture with  no evidence of a shadowing stone, focal mass or hydronephrosis.   10.1  cm in long axis dimension.  Left kidney:  The left kidney demonstrates normal echotexture with no  evidence of a shadowing stone, focal mass or hydronephrosis.   10.3 cm  in long axis dimension.    Pancreas: Poorly visualized.    Spleen:  The spleen measures 13.2 cm. Splenic parenchyma poorly  visualized.    Visualized portions of the aorta are unremarkable.    Bladder with Conti in place.    LIVER DOPPLER:  Splenic vein:  Patent continuous normal antegrade direction flow  towards the liver, 53 cm/sec.  Extrahepatic portal vein:  Patent continuous antegrade flow, 94  cm/sec.  Portal vein at anastomosis: Patent continuous antegrade flow, 106  cm/sec.  Intrahepatic portal vein:  Patent continuous antegrade flow, 99  cm/sec.  Right portal vein flow is antegrade, measuring 78 cm/sec.  Left portal vein flow is antegrade, measuring 28  cm/sec.    Inferior vena cava: patent with flow toward the heart throughout..  IVC above anastomosis:  143 cm/sec.  IVC at anastomosis:  159 cm/sec.  Intrahepatic IVC:  130 cm/sec.  Extrahepatic IVC:  111 cm/sec.    Right, mid, left hepatic veins: Patent with flow towards the inferior  vena cava.    Extrahepatic hepatic artery: Low resistance waveform with flow towards  the liver. 198 cm/sec with resistive index 0.67.  Intrahepatic hepatic artery: Low resistance waveform with flow towards  the liver. 82 cm/sec with resistive index 0.67.  Right hepatic artery: 56 cm/sec with resistive index 0.42.  Left hepatic artery: 74 cm/sec with resistive index 0.58.      Impression    Impression:   1.  Patent Doppler exam of the transplant liver. Slightly low right  hepatic artery resistive index which could be related to postoperative  edema.  2.  Unremarkable appearance of the transplant liver.  3.  Perihepatic fluid culture measuring 5.4 cm. Small right pleural  effusion.  4.  The common bile duct and pancreas are not visualized. Splenic  parenchyma also poorly visualized.    I have personally reviewed the examination and initial interpretation  and I agree with the findings.    JARETT ARRINGTNO MD         SYSTEM ID:  [id number]

## 2024-06-23 NOTE — PROGRESS NOTES
Immunosuppression Management Note:    Abiola Matute is a 58 year old female who is seen today  for immunosuppression management     I, Pravin Ball MD, I have examined the patient with our DINESH/Fellow as part of a shared visit.   I participated in the rounds,  discussed and agree with the note and findings and  reviewed today's vital signs, medications, labs and imaging as noted in this note.  I  reviewed the  immunosuppression medications.  I personally provided a substantive portion of the care of this patient. I personally performed the immunosuppressive management of this patient, reviewed the overall  immunosuppression including drug levels, allograft function and provided the recommendations to adjust the dose to provide optimal levels to prevent rejection of the allograft and prevent toxicity to the organs. This was complex care due to the fresh allograft.   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 Saavedra, thrombocytopenia (~100s), hyponatremia, and ascites. Other history includes osteopenia, LE cellulitis, crohn's disease on infliximab, and obesity. She is now s/p DCD DDLT +biliary stent placement (on OCS x 19.4 hours) on 6/22/24 with Dr. Ball.     s/p DCD DDLT +stent 6/22/24: POD#1. LFTs now downtrending. Follow up US with patent vasculature.    - Continue ASA 81 mg daily and ursodiol BID.    - Start alprostadil gtt.     Immunosuppressed status secondary to medications:   Induction: Steroid taper per protocol.   Maintenance:    -  mg BID   - Tacrolimus 2 mg BID. Goal level 8-12.    -  Prednisone 5 mg daily following taper.    Neuro:  Acute post op pain: per SICU.     Hematology:   Acute blood loss anemia: Hgb 8-9, stable.   Chronic thrombocytopenia: r/t liver disease. PLT 50's, monitor.     Cardiorespiratory: No issues.     GI/Nutrition:   Moderate malnutrition in the context of chronic illness: Nutrition consulted. ADAT.      Endocrine:   Steroid induced hyperglycemia: continue insulin gtt.    Fluid/Electrolytes: Electrolyte replacements per ICU protocol.     : Conti to be discontinued today    Infectious disease: No issues.     Prophylaxis: DVT, fall, GI, fungal (Fluconzole, nystatin), viral (Valcyte), pneumocystis (sulfa allergy, G6PD in process, continue dapsone), periop (Zosyn)    Disposition: SICU     DINESH/Fellow/Resident Provider: Philly Webb NP [id number]     Faculty: Pravin Ball M.D.  __________________________________________________________________  Transplant History:    6/22/2024 (Liver), Postoperative day: 1     Interval History: History is obtained from the patient, spouse  Overnight events: No acute events overnight. Pain is well controlled. Breathing comfortably. Denies N/V. Tolerating clears. Passing gas, no BM. Worked with PT.     ROS:   A 10-point review of systems was negative except as noted above.    Curent Meds:  Current Facility-Administered Medications   Medication Dose Route Frequency Provider Last Rate Last Admin    aspirin (ASA) chewable tablet 81 mg  81 mg Oral or Feeding Tube Daily Isaac Vargas MD   81 mg at 06/23/24 0755    dapsone (ACZONE) tablet 100 mg  100 mg Oral or Feeding Tube Daily Isaac Vargas MD   100 mg at 06/23/24 0755    [START ON 6/24/2024] methylPREDNISolone sodium succinate (solu-MEDROL) injection 100 mg  100 mg Intravenous Once Isaac Vargas MD        Followed by    [START ON 6/25/2024] methylPREDNISolone sodium succinate (solu-MEDROL) injection 50 mg  50 mg Intravenous Once Isaac Vargas MD        Followed by    [START ON  6/26/2024] predniSONE (DELTASONE) tablet 25 mg  25 mg Oral or Feeding Tube Once Isaac Vargas MD        Followed by    [START ON 6/27/2024] predniSONE (DELTASONE) tablet 10 mg  10 mg Oral or Feeding Tube Once Isaac Vargas MD        mycophenolate (GENERIC EQUIVALENT) capsule 750 mg  750 mg Oral BID IS Isaac Vargas MD   750 mg at 06/23/24 0754    Or    mycophenolate (CELLCEPT BRAND) suspension 750 mg  750 mg Oral or NG Tube BID IS Isaac Vargas MD        [START ON 6/24/2024] nystatin (MYCOSTATIN) suspension 500,000 Units  500,000 Units Mouth/Throat 4x Daily Fern Cat MD        [START ON 6/24/2024] pantoprazole (PROTONIX) EC tablet 40 mg  40 mg Oral QAM AC Pravin Ball MD        piperacillin-tazobactam (ZOSYN) 3.375 g vial to attach to  mL bag  3.375 g Intravenous Q6H Isaac Vargas MD   3.375 g at 06/23/24 0754    sodium chloride (PF) 0.9% PF flush 3 mL  3 mL Intravenous Q8H Isaac Vargas MD   3 mL at 06/22/24 1621    sodium chloride (PF) 0.9% PF flush 3 mL  3 mL Intracatheter Q8H Darcie Newby PA-C   3 mL at 06/23/24 0803    tacrolimus (GENERIC EQUIVALENT) capsule 2 mg  2 mg Oral BID IS Isaac Vargas MD   2 mg at 06/23/24 0754    Or    tacrolimus (GENERIC) suspension 2 mg  2 mg Oral or NG Tube BID IS Isaac Vargas MD        ursodiol (ACTIGALL) capsule 300 mg  300 mg Oral or Feeding Tube BID Isaac Vargas MD   300 mg at 06/23/24 0755    Or    ursodiol (ACTIGALL) suspension 300 mg  300 mg Oral or NG Tube BID Isaac Vargas MD        valGANciclovir (VALCYTE) tablet 450 mg  450 mg Oral Daily Isaac Vargas MD   450 mg at 06/23/24 0754    Or    valGANciclovir (VALCYTE) solution 450 mg  450 mg Oral or NG Tube Daily Isaac Vargas MD           Physical Exam:     Admit Weight: 77.3 kg (170 lb 8 oz)    Current Vitals:   /61   Pulse 90   Temp 98.1  F (36.7  C) (Oral)   Resp 12   Wt 87.2 kg (192 lb 3.9 oz)   LMP 05/31/2006   SpO2 91%   BMI 31.99 kg/m       Vital sign ranges:    Temp:  [97.6  F (36.4  C)-98.8  F (37.1  C)] 98.1  F (36.7  C)  Pulse:  [] 90  Resp:  [12-16] 12  BP: (116-124)/(61-66) 116/61  MAP:  [84 mmHg-119 mmHg] 97 mmHg  Arterial Line BP: (125-166)/(61-85) 138/70  SpO2:  [90 %-99 %] 91 %    General Appearance: in no apparent distress.   Skin: normal, warm, dry, +jaundice  Heart: RRR  Lungs: unlabored on RA  Abdomen: soft. Incision covered; C/D/I. JACEK with serosanguinous output.   : no Conti  Extremities: edema: generalized   Neurologic: A&Ox4. Tremor absent.     Frailty Scores          4/23/2024 2/4/2024 2/14/2023   Frailty Scores   Final Score Not Frail Not Frail Not Frail   Final Score Number 0 0 1      Details                   Data:   CMP  Recent Labs   Lab 06/23/24  1001 06/23/24  0811 06/23/24  0538 06/23/24  0343 06/22/24 2010 06/22/24  1949 06/22/24  1906 06/22/24  1815 06/22/24  1517 06/22/24  1358 06/22/24  1355 06/22/24  0821 06/20/24  1651   NA  --   --   --  139  --   --   --  139  --   --  143  143   < > 134*   POTASSIUM  --   --   --  4.4  --  3.8  --  4.2  --   --  3.4  3.4   < > 4.0   CHLORIDE  --   --   --  107  --   --   --  106  --   --  108*  108*  --  102   CO2  --   --   --  22  --   --   --  18*  --   --  17*  17*  --  23   * 168*   < > 148*   < >  --    < > 300*   < >  --  213*  213*   < > 101*   BUN  --   --   --  13.2  --   --   --  11.7  --   --  9.2  9.2  --  11.0   CR  --   --   --  0.64  --   --   --  0.62  --   --  0.53  0.53  --  0.77   GFRESTIMATED  --   --   --  >90  --   --   --  >90  --   --  >90  >90  --  88   ARIEL  --   --   --  8.7  --   --   --  9.0  --   --  9.3  9.3  --  8.9   ICAW  --   --   --  4.9  --   --   --   --   --  5.1  --    < >  --    MAG  --   --   --  1.8  --   --   --   --   --   --  1.8  --  1.9   PHOS  --   --   --  2.8  --   --   --   --   --   --  4.3  --  3.2   AMYLASE  --   --   --  114*  --   --   --   --   --   --   --   --  124*   LIPASE  --   --   --  115*   --   --   --   --   --   --   --   --   --    ALBUMIN  --   --   --  3.8  --   --   --   --   --   --  2.8*  2.8*  --  3.7   BILITOTAL  --   --   --  7.9*  --   --   --   --   --   --  4.0*  4.0*  --  1.8*   ALKPHOS  --   --   --  88  --   --   --   --   --   --  83  83  --  112   AST  --   --   --  7,100*  --   --   --   --   --   --  5,788*  5,788*  --  71*   ALT  --   --   --  1,199*  --   --   --   --   --   --  1,494*  1,494*  --  37    < > = values in this interval not displayed.     CBC  Recent Labs   Lab 06/23/24  0343 06/22/24  2201 06/22/24  1815 06/22/24  1613   HGB 8.8* 7.6* 8.6*  --    WBC 6.7  --  8.1  --    PLT 50*  --  92*  --    A1C  --   --   --  5.1

## 2024-06-23 NOTE — PROGRESS NOTES
Major Shift Events: Pt getting 10mg PRN oxy given q4, 0.2 dilauded given x1. Tachycardic, team aware- lactic trending down. SBP goal <160. Art line w/ minimal bleeding at site, appropriate waveforms. Afebrile. 4L NC. Transfused 1 unit PRBCs, and 1 unit cryo, 1 unit of plasma, 500 albumin given. Conti w/ good output. 1JP. Dressing marked. D5 1/2 NS infiltrated on R arm, causing swelling without pain. Small skin tear on R arm where peripheral was, quickclot applied and dressed w/ tape. Clamshell incision w/ minimal drainaged, dressing marked.    Insuling @ algorithm 4, D5 1/2NS @ 100ml/hr.    Plan: Give second unit of plasma, possible transfer to floor later today.     For vital signs and complete assessments, please see documentation flowsheets.

## 2024-06-23 NOTE — PROGRESS NOTES
"   06/23/24 1012   Appointment Info   Signing Clinician's Name / Credentials (PT) Mili Montelongo, PT, DPT   Rehab Comments (PT) abd. precs       Present no   Living Environment   People in Home spouse;child(elisa), adult   Current Living Arrangements house   Home Accessibility stairs to enter home;stairs within home   Number of Stairs, Main Entrance 2   Stair Railings, Main Entrance railings safe and in good condition  (single handrail)   Number of Stairs, Within Home, Primary seven   Stair Railings, Within Home, Primary railings safe and in good condition  (single handrail)   Transportation Anticipated car, drives self;family or friend will provide   Living Environment Comments Pt reports she lives in a split-level home with her spouse and adult son, who both will be present/available to assist. 2 YESENIA with single handrail. Must negotiate 7 steps up with single handrail for bedroom and bathroom with tub/shower. (+) driving but spouse can provide.   Self-Care   Usual Activity Tolerance good   Current Activity Tolerance fair   Regular Exercise No   Equipment Currently Used at Home none   Fall history within last six months no   Activity/Exercise/Self-Care Comment IND with ADL's and mobility. No regular exercise. No falls. No home O2.   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 Saavedra, thrombocytopenia (~100s), hyponatremia and ascites. PMH also significant for stable tiny pulmonary nodule (3mm) at right major fissure on last CT 7/7/23, osteopenia, LE cellulitis, crohn's disease on " "infliximab and obesity. Now s/p DDLT 6/21/24 with Dr. Ball.\"   Existing Precautions/Restrictions abdominal;fall   Weight-Bearing Status - LUE partial weight-bearing (% in comments)  (no lifting >10lbs)   Weight-Bearing Status - RUE partial weight-bearing (% in comments)  (no lifting >10lbs)   Weight-Bearing Status - LLE full weight-bearing   Weight-Bearing Status - RLE full weight-bearing   General Observations Activity: Adult ICU Early Mobility Protocol   Cognition   Orientation Status (Cognition) oriented x 4   Pain Assessment   Patient Currently in Pain Yes, see Vital Sign flowsheet   Integumentary/Edema   Integumentary/Edema other (describe)   Integumentary/Edema Comments edema noted to BLE   Posture    Posture Forward head position;Protracted shoulders   Range of Motion (ROM)   Range of Motion ROM deficits secondary to pain   Strength (Manual Muscle Testing)   Strength (Manual Muscle Testing) Deficits observed during functional mobility   Strength Comments grossly 3/5 in BLE; limited by pain   Bed Mobility   Comment, (Bed Mobility) supine > sit with min/mod A x2 using log roll technique   Transfers   Comment, (Transfers) sit > stand with min/mod A x2 and HHA   Gait/Stairs (Locomotion)   Comment, (Gait/Stairs) gait impaired   Balance   Balance other (describe)   Balance Comments fair(+) sitting balance; fair(-)/fair standing balance   Sensory Examination   Sensory Perception other (describe)   Sensory Perception Comments reports numbness to B ankles   Coordination   Coordination no deficits were identified   Muscle Tone   Muscle Tone no deficits were identified   Clinical Impression   Criteria for Skilled Therapeutic Intervention Yes, treatment indicated   PT Diagnosis (PT) impaired functional mobility   Influenced by the following impairments decreased balance, strength, and endurance; increased pain   Functional limitations due to impairments difficulty with functional mobility   Clinical Presentation " (PT Evaluation Complexity) evolving   Clinical Presentation Rationale per clinical judgment   Clinical Decision Making (Complexity) moderate complexity   Planned Therapy Interventions (PT) balance training;bed mobility training;gait training;home exercise program;motor coordination training;neuromuscular re-education;patient/family education;postural re-education;ROM (range of motion);stair training;strengthening;stretching;transfer training;progressive activity/exercise;risk factor education;home program guidelines   Risk & Benefits of therapy have been explained evaluation/treatment results reviewed;care plan/treatment goals reviewed;risks/benefits reviewed;current/potential barriers reviewed;participants voiced agreement with care plan;participants included;patient   PT Total Evaluation Time   PT Eval, Moderate Complexity Minutes (14845) 6   Physical Therapy Goals   PT Frequency 6x/week   PT Predicted Duration/Target Date for Goal Attainment 07/07/24   PT: Bed Mobility Independent;Supine to/from sit;Rolling;Bridging;Within precautions  (with HOB flat)   PT: Transfers Independent;Sit to/from stand;Bed to/from chair;Within precautions   PT: Gait Independent;Within precautions;Greater than 200 feet   PT: Stairs Modified independent;7 stairs  (single handrail)   PT Discharge Planning   PT Plan review precs, flat bed mobility, repeated STS and pivots (trial FWW), pre-gait > gait as able, BLE ther ex   PT Discharge Recommendation (DC Rec) [facility]-Motivated patient will benefit from intensive, interdisciplinary therapy.  Anticipate will be able to tolerate 3 hours of therapy per day   PT Rationale for DC Rec Pt is below baseline for functional mobility. Currently Ax2 for transfers; unable to ambulate. Primarily limited by pain as well as decreased endurance/activity tolerance, balance, and strength. Recommend ARU at this time to improve safety and IND prior to returning home with family. Anticipated to make  good progress with IP therapies and may progress to home with PT. Will update as appropriate.   PT Brief overview of current status Ax2 for bed mobility using log roll technique and transfers bed <> recliner/commode; OOB throughout day   PT Equipment Needed at Discharge   (tbd)

## 2024-06-23 NOTE — PROGRESS NOTES
06/23/24 1523   Appointment Info   Signing Clinician's Name / Credentials (OT) Tez Lama, OTR/L   Living Environment   People in Home spouse;child(elisa), adult  ( and son)   Current Living Arrangements house   Transportation Anticipated car, drives self;family or friend will provide   Living Environment Comments 2 YESENIA, split level entry, 7 stairs up to bedroom, tub shower, kitchen   Self-Care   Usual Activity Tolerance good   Current Activity Tolerance fair   Regular Exercise No   Equipment Currently Used at Home none   Fall history within last six months no   Activity/Exercise/Self-Care Comment Pt was I in all ADL/IADLs prior to admission, including driving, cooking, cleaning, and medication management.   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 Saavedra, thrombocytopenia (~100s), hyponatremia and ascites. PMH also significant for stable tiny pulmonary nodule (3mm) at right major fissure on last CT 7/7/23, osteopenia, LE cellulitis, crohn's disease on infliximab and obesity. Now s/p DDLT 6/21/24   Existing Precautions/Restrictions fall;abdominal   Cognitive Status Examination   Cognitive Status Comments No concerns   Visual Perception   Visual Acuity Wears glasses for reading mainly   Activities of Daily Living   BADL Assessment/Intervention bathing;lower body dressing;grooming;toileting   Bathing Assessment/Intervention   [place] Level (Bathing) moderate assist (50% patient effort)   Comment, (Bathing) per clinical judgement   Lower Body Dressing Assessment/Training   Comment, (Lower Body Dressing) per clinical judgement   [place] Level (Lower Body Dressing) maximum assist (25% patient effort)   Grooming  Assessment/Training   Martin Level (Grooming) contact guard assist   Toileting   Martin Level (Toileting) minimum assist (75% patient effort)   Clinical Impression   Criteria for Skilled Therapeutic Interventions Met (OT) Yes, treatment indicated   OT Diagnosis Decreased I in ADLs due to deconditioning   Assessment of Occupational Performance 3-5 Performance Deficits   Identified Performance Deficits dressing, bathing, toileting, functional endurance, g/h   Clinical Decision Making Complexity (OT) detailed assessment/moderate complexity   Risk & Benefits of therapy have been explained patient   OT Total Evaluation Time   OT Eval, Low Complexity Minutes (15200) 10

## 2024-06-23 NOTE — PROCEDURES
[facility]    Central line    Date/Time: 6/23/2024 5:30 PM    Performed by: Fern Cat MD  Authorized by: Fern Cat MD      UNIVERSAL PROTOCOL   Site Marked: NA  Prior Images Obtained and Reviewed:  NA  Required items: Required blood products, implants, devices and special equipment available    Patient identity confirmed:  Verbally with patient  NA - No sedation, light sedation, or local anesthesia  Confirmation Checklist:  Correct equipment/implants were available  Time out: Immediately prior to the procedure a time out was called    Universal Protocol: the Joint Commission Universal Protocol was followed    Preparation: Patient was prepped and draped in usual sterile fashion       ANESTHESIA    Local Anesthetic:  Lidocaine 1% without epinephrine      SEDATION    Patient Sedated: No      Preparation: skin prepped with 2% chlorhexidine  Patient position: Trendelenburg  Post-procedure: line sutured and dressing applied  Assessment: no pneumothorax on x-ray      PROCEDURE    Length of time physician/provider present for 1:1 monitoring during sedation: 0      - EBL 10 ml   - Pt tolerated procedure well   - Follow up CXR without pneumo   - SICU fellow was present for the entire procedure     Fern Cat, DO  General Surgery, PGY-1

## 2024-06-24 ENCOUNTER — APPOINTMENT (OUTPATIENT)
Dept: PHYSICAL THERAPY | Facility: CLINIC | Age: 59
DRG: 006 | End: 2024-06-24
Attending: TRANSPLANT SURGERY
Payer: MEDICARE

## 2024-06-24 ENCOUNTER — DOCUMENTATION ONLY (OUTPATIENT)
Dept: TRANSPLANT | Facility: CLINIC | Age: 59
End: 2024-06-24
Payer: MEDICARE

## 2024-06-24 DIAGNOSIS — K70.31 ALCOHOLIC CIRRHOSIS OF LIVER WITH ASCITES (H): ICD-10-CM

## 2024-06-24 DIAGNOSIS — Z94.4 LIVER REPLACED BY TRANSPLANT (H): Primary | ICD-10-CM

## 2024-06-24 DIAGNOSIS — B99.8 OTHER INFECTIOUS DISEASE: ICD-10-CM

## 2024-06-24 DIAGNOSIS — F10.11 ALCOHOL ABUSE, IN REMISSION: ICD-10-CM

## 2024-06-24 DIAGNOSIS — Z13.220 LIPID SCREENING: ICD-10-CM

## 2024-06-24 DIAGNOSIS — C22.0 HCC (HEPATOCELLULAR CARCINOMA) (H): ICD-10-CM

## 2024-06-24 LAB
ALBUMIN SERPL BCG-MCNC: 3.5 G/DL (ref 3.5–5.2)
ALP SERPL-CCNC: 112 U/L (ref 40–150)
ALT SERPL W P-5'-P-CCNC: 777 U/L (ref 0–50)
ANION GAP SERPL CALCULATED.3IONS-SCNC: 9 MMOL/L (ref 7–15)
AST SERPL W P-5'-P-CCNC: 924 U/L (ref 0–45)
BASOPHILS # BLD AUTO: 0 10E3/UL (ref 0–0.2)
BASOPHILS NFR BLD AUTO: 0 %
BILIRUB DIRECT SERPL-MCNC: 2.42 MG/DL (ref 0–0.3)
BILIRUB SERPL-MCNC: 4.6 MG/DL
BUN SERPL-MCNC: 17.9 MG/DL (ref 8–23)
CA-I BLD-MCNC: 4.6 MG/DL (ref 4.4–5.2)
CALCIUM SERPL-MCNC: 8.2 MG/DL (ref 8.6–10)
CHLORIDE SERPL-SCNC: 105 MMOL/L (ref 98–107)
CREAT SERPL-MCNC: 0.62 MG/DL (ref 0.51–0.95)
DEPRECATED HCO3 PLAS-SCNC: 23 MMOL/L (ref 22–29)
EGFRCR SERPLBLD CKD-EPI 2021: >90 ML/MIN/1.73M2
EOSINOPHIL # BLD AUTO: 0 10E3/UL (ref 0–0.7)
EOSINOPHIL NFR BLD AUTO: 0 %
ERYTHROCYTE [DISTWIDTH] IN BLOOD BY AUTOMATED COUNT: 18.8 % (ref 10–15)
GLUCOSE BLDC GLUCOMTR-MCNC: 131 MG/DL (ref 70–99)
GLUCOSE BLDC GLUCOMTR-MCNC: 135 MG/DL (ref 70–99)
GLUCOSE BLDC GLUCOMTR-MCNC: 143 MG/DL (ref 70–99)
GLUCOSE BLDC GLUCOMTR-MCNC: 152 MG/DL (ref 70–99)
GLUCOSE BLDC GLUCOMTR-MCNC: 161 MG/DL (ref 70–99)
GLUCOSE BLDC GLUCOMTR-MCNC: 192 MG/DL (ref 70–99)
GLUCOSE BLDC GLUCOMTR-MCNC: 97 MG/DL (ref 70–99)
GLUCOSE SERPL-MCNC: 105 MG/DL (ref 70–99)
HBV DNA SERPL QL NAA+PROBE: NORMAL
HCT VFR BLD AUTO: 23.6 % (ref 35–47)
HCV RNA SERPL QL NAA+PROBE: NORMAL
HGB BLD-MCNC: 8.7 G/DL (ref 11.7–15.7)
HIV1+2 RNA SERPL QL NAA+PROBE: NORMAL
IMM GRANULOCYTES # BLD: 0.2 10E3/UL
IMM GRANULOCYTES NFR BLD: 1 %
INR PPP: 1.74 (ref 0.85–1.15)
LACTATE SERPL-SCNC: 0.9 MMOL/L (ref 0.7–2)
LACTATE SERPL-SCNC: 2.4 MMOL/L (ref 0.7–2)
LYMPHOCYTES # BLD AUTO: 0.3 10E3/UL (ref 0.8–5.3)
LYMPHOCYTES NFR BLD AUTO: 2 %
MAGNESIUM SERPL-MCNC: 2 MG/DL (ref 1.7–2.3)
MCH RBC QN AUTO: 33 PG (ref 26.5–33)
MCHC RBC AUTO-ENTMCNC: 36.9 G/DL (ref 31.5–36.5)
MCV RBC AUTO: 89 FL (ref 78–100)
MONOCYTES # BLD AUTO: 0.4 10E3/UL (ref 0–1.3)
MONOCYTES NFR BLD AUTO: 3 %
NEUTROPHILS # BLD AUTO: 12.8 10E3/UL (ref 1.6–8.3)
NEUTROPHILS NFR BLD AUTO: 94 %
NRBC # BLD AUTO: 0 10E3/UL
NRBC BLD AUTO-RTO: 0 /100
PHOSPHATE SERPL-MCNC: 3.2 MG/DL (ref 2.5–4.5)
PLATELET # BLD AUTO: 71 10E3/UL (ref 150–450)
POTASSIUM SERPL-SCNC: 3.8 MMOL/L (ref 3.4–5.3)
PROT SERPL-MCNC: 5.7 G/DL (ref 6.4–8.3)
RBC # BLD AUTO: 2.64 10E6/UL (ref 3.8–5.2)
SODIUM SERPL-SCNC: 137 MMOL/L (ref 135–145)
WBC # BLD AUTO: 13.7 10E3/UL (ref 4–11)

## 2024-06-24 PROCEDURE — 250N000012 HC RX MED GY IP 250 OP 636 PS 637: Performed by: STUDENT IN AN ORGANIZED HEALTH CARE EDUCATION/TRAINING PROGRAM

## 2024-06-24 PROCEDURE — 82248 BILIRUBIN DIRECT: CPT | Performed by: SURGERY

## 2024-06-24 PROCEDURE — 250N000013 HC RX MED GY IP 250 OP 250 PS 637

## 2024-06-24 PROCEDURE — 250N000011 HC RX IP 250 OP 636: Mod: JZ | Performed by: STUDENT IN AN ORGANIZED HEALTH CARE EDUCATION/TRAINING PROGRAM

## 2024-06-24 PROCEDURE — 84100 ASSAY OF PHOSPHORUS: CPT | Performed by: TRANSPLANT SURGERY

## 2024-06-24 PROCEDURE — 250N000011 HC RX IP 250 OP 636: Mod: JZ

## 2024-06-24 PROCEDURE — 120N000011 HC R&B TRANSPLANT UMMC

## 2024-06-24 PROCEDURE — 83735 ASSAY OF MAGNESIUM: CPT | Performed by: TRANSPLANT SURGERY

## 2024-06-24 PROCEDURE — 85610 PROTHROMBIN TIME: CPT | Performed by: SURGERY

## 2024-06-24 PROCEDURE — 250N000011 HC RX IP 250 OP 636: Mod: JZ | Performed by: TRANSPLANT SURGERY

## 2024-06-24 PROCEDURE — 250N000013 HC RX MED GY IP 250 OP 250 PS 637: Performed by: SURGERY

## 2024-06-24 PROCEDURE — 36592 COLLECT BLOOD FROM PICC: CPT | Performed by: PHYSICIAN ASSISTANT

## 2024-06-24 PROCEDURE — 250N000009 HC RX 250: Performed by: PHYSICIAN ASSISTANT

## 2024-06-24 PROCEDURE — P9045 ALBUMIN (HUMAN), 5%, 250 ML: HCPCS | Mod: JZ | Performed by: TRANSPLANT SURGERY

## 2024-06-24 PROCEDURE — 97530 THERAPEUTIC ACTIVITIES: CPT | Mod: GP

## 2024-06-24 PROCEDURE — 250N000013 HC RX MED GY IP 250 OP 250 PS 637: Performed by: NURSE PRACTITIONER

## 2024-06-24 PROCEDURE — 36592 COLLECT BLOOD FROM PICC: CPT | Performed by: TRANSPLANT SURGERY

## 2024-06-24 PROCEDURE — 82330 ASSAY OF CALCIUM: CPT | Performed by: SURGERY

## 2024-06-24 PROCEDURE — 250N000013 HC RX MED GY IP 250 OP 250 PS 637: Performed by: PHYSICIAN ASSISTANT

## 2024-06-24 PROCEDURE — 83605 ASSAY OF LACTIC ACID: CPT | Performed by: TRANSPLANT SURGERY

## 2024-06-24 PROCEDURE — 99231 SBSQ HOSP IP/OBS SF/LOW 25: CPT | Performed by: PHYSICIAN ASSISTANT

## 2024-06-24 PROCEDURE — 250N000011 HC RX IP 250 OP 636: Mod: JZ | Performed by: SURGERY

## 2024-06-24 PROCEDURE — 250N000013 HC RX MED GY IP 250 OP 250 PS 637: Performed by: TRANSPLANT SURGERY

## 2024-06-24 PROCEDURE — 83605 ASSAY OF LACTIC ACID: CPT | Performed by: PHYSICIAN ASSISTANT

## 2024-06-24 PROCEDURE — 80048 BASIC METABOLIC PNL TOTAL CA: CPT | Performed by: SURGERY

## 2024-06-24 PROCEDURE — 258N000003 HC RX IP 258 OP 636: Mod: JZ | Performed by: STUDENT IN AN ORGANIZED HEALTH CARE EDUCATION/TRAINING PROGRAM

## 2024-06-24 PROCEDURE — 250N000012 HC RX MED GY IP 250 OP 636 PS 637: Performed by: SURGERY

## 2024-06-24 PROCEDURE — 250N000011 HC RX IP 250 OP 636: Performed by: STUDENT IN AN ORGANIZED HEALTH CARE EDUCATION/TRAINING PROGRAM

## 2024-06-24 PROCEDURE — 85025 COMPLETE CBC W/AUTO DIFF WBC: CPT | Performed by: SURGERY

## 2024-06-24 PROCEDURE — 99233 SBSQ HOSP IP/OBS HIGH 50: CPT | Mod: 24 | Performed by: SURGERY

## 2024-06-24 RX ORDER — NICOTINE POLACRILEX 4 MG
15-30 LOZENGE BUCCAL
Status: DISCONTINUED | OUTPATIENT
Start: 2024-06-24 | End: 2024-06-24

## 2024-06-24 RX ORDER — TRAZODONE HYDROCHLORIDE 50 MG/1
50 TABLET, FILM COATED ORAL AT BEDTIME
Status: DISCONTINUED | OUTPATIENT
Start: 2024-06-24 | End: 2024-06-28 | Stop reason: HOSPADM

## 2024-06-24 RX ORDER — FUROSEMIDE 10 MG/ML
20 INJECTION INTRAMUSCULAR; INTRAVENOUS ONCE
Status: COMPLETED | OUTPATIENT
Start: 2024-06-24 | End: 2024-06-24

## 2024-06-24 RX ORDER — DEXTROSE MONOHYDRATE 25 G/50ML
25-50 INJECTION, SOLUTION INTRAVENOUS
Status: DISCONTINUED | OUTPATIENT
Start: 2024-06-24 | End: 2024-06-24

## 2024-06-24 RX ORDER — POLYETHYLENE GLYCOL 3350 17 G/17G
17 POWDER, FOR SOLUTION ORAL 2 TIMES DAILY
Status: DISCONTINUED | OUTPATIENT
Start: 2024-06-24 | End: 2024-06-28 | Stop reason: HOSPADM

## 2024-06-24 RX ORDER — POTASSIUM CHLORIDE 750 MG/1
20 TABLET, EXTENDED RELEASE ORAL ONCE
Status: COMPLETED | OUTPATIENT
Start: 2024-06-24 | End: 2024-06-24

## 2024-06-24 RX ORDER — HYDROMORPHONE HCL IN WATER/PF 6 MG/30 ML
.2-.4 PATIENT CONTROLLED ANALGESIA SYRINGE INTRAVENOUS EVERY 6 HOURS PRN
Status: DISCONTINUED | OUTPATIENT
Start: 2024-06-24 | End: 2024-06-24

## 2024-06-24 RX ORDER — PREDNISONE 5 MG/1
5 TABLET ORAL DAILY
Status: DISCONTINUED | OUTPATIENT
Start: 2024-06-28 | End: 2024-06-28 | Stop reason: HOSPADM

## 2024-06-24 RX ORDER — MAGNESIUM OXIDE 400 MG/1
400 TABLET ORAL EVERY 4 HOURS
Status: COMPLETED | OUTPATIENT
Start: 2024-06-24 | End: 2024-06-24

## 2024-06-24 RX ADMIN — NYSTATIN 500000 UNITS: 100000 SUSPENSION ORAL at 22:45

## 2024-06-24 RX ADMIN — Medication 25 MG: at 22:47

## 2024-06-24 RX ADMIN — FUROSEMIDE 20 MG: 10 INJECTION, SOLUTION INTRAMUSCULAR; INTRAVENOUS at 13:19

## 2024-06-24 RX ADMIN — ALBUMIN HUMAN 25 G: 0.05 INJECTION, SOLUTION INTRAVENOUS at 21:25

## 2024-06-24 RX ADMIN — PANTOPRAZOLE SODIUM 40 MG: 40 TABLET, DELAYED RELEASE ORAL at 08:37

## 2024-06-24 RX ADMIN — MAGNESIUM OXIDE TAB 400 MG (241.3 MG ELEMENTAL MG) 400 MG: 400 (241.3 MG) TAB at 11:57

## 2024-06-24 RX ADMIN — POTASSIUM CHLORIDE 20 MEQ: 750 TABLET, EXTENDED RELEASE ORAL at 18:09

## 2024-06-24 RX ADMIN — URSODIOL 300 MG: 300 CAPSULE ORAL at 08:32

## 2024-06-24 RX ADMIN — OXYCODONE HYDROCHLORIDE 10 MG: 5 TABLET ORAL at 00:56

## 2024-06-24 RX ADMIN — HYDROMORPHONE HYDROCHLORIDE 0.2 MG: 0.2 INJECTION, SOLUTION INTRAMUSCULAR; INTRAVENOUS; SUBCUTANEOUS at 06:25

## 2024-06-24 RX ADMIN — MAGNESIUM OXIDE TAB 400 MG (241.3 MG ELEMENTAL MG) 400 MG: 400 (241.3 MG) TAB at 08:36

## 2024-06-24 RX ADMIN — DAPSONE 100 MG: 100 TABLET ORAL at 08:32

## 2024-06-24 RX ADMIN — PIPERACILLIN SODIUM AND TAZOBACTAM SODIUM 3.38 G: 3; .375 INJECTION, POWDER, LYOPHILIZED, FOR SOLUTION INTRAVENOUS at 08:27

## 2024-06-24 RX ADMIN — DOCUSATE SODIUM 50 MG AND SENNOSIDES 8.6 MG 1 TABLET: 8.6; 5 TABLET, FILM COATED ORAL at 08:31

## 2024-06-24 RX ADMIN — PIPERACILLIN SODIUM AND TAZOBACTAM SODIUM 3.38 G: 3; .375 INJECTION, POWDER, LYOPHILIZED, FOR SOLUTION INTRAVENOUS at 02:20

## 2024-06-24 RX ADMIN — OXYCODONE HYDROCHLORIDE 10 MG: 5 TABLET ORAL at 11:58

## 2024-06-24 RX ADMIN — METHYLPREDNISOLONE SODIUM SUCCINATE 100 MG: 125 INJECTION, POWDER, FOR SOLUTION INTRAMUSCULAR; INTRAVENOUS at 08:42

## 2024-06-24 RX ADMIN — TACROLIMUS 2 MG: 1 CAPSULE ORAL at 08:34

## 2024-06-24 RX ADMIN — OXYCODONE HYDROCHLORIDE 10 MG: 5 TABLET ORAL at 08:33

## 2024-06-24 RX ADMIN — NYSTATIN 500000 UNITS: 100000 SUSPENSION ORAL at 18:10

## 2024-06-24 RX ADMIN — VALGANCICLOVIR HYDROCHLORIDE 450 MG: 450 TABLET ORAL at 08:37

## 2024-06-24 RX ADMIN — INSULIN HUMAN 4 UNITS/HR: 1 INJECTION, SOLUTION INTRAVENOUS at 04:17

## 2024-06-24 RX ADMIN — INSULIN ASPART 1 UNITS: 100 INJECTION, SOLUTION INTRAVENOUS; SUBCUTANEOUS at 13:22

## 2024-06-24 RX ADMIN — DOCUSATE SODIUM 50 MG AND SENNOSIDES 8.6 MG 2 TABLET: 8.6; 5 TABLET, FILM COATED ORAL at 20:08

## 2024-06-24 RX ADMIN — HYDROMORPHONE HYDROCHLORIDE 0.2 MG: 0.2 INJECTION, SOLUTION INTRAMUSCULAR; INTRAVENOUS; SUBCUTANEOUS at 02:20

## 2024-06-24 RX ADMIN — OXYCODONE HYDROCHLORIDE 10 MG: 5 TABLET ORAL at 04:36

## 2024-06-24 RX ADMIN — MYCOPHENOLATE MOFETIL 750 MG: 250 CAPSULE ORAL at 08:34

## 2024-06-24 RX ADMIN — HYDROMORPHONE HYDROCHLORIDE 0.4 MG: 0.2 INJECTION, SOLUTION INTRAMUSCULAR; INTRAVENOUS; SUBCUTANEOUS at 11:23

## 2024-06-24 RX ADMIN — NYSTATIN 500000 UNITS: 100000 SUSPENSION ORAL at 08:39

## 2024-06-24 RX ADMIN — INSULIN ASPART 1 UNITS: 100 INJECTION, SOLUTION INTRAVENOUS; SUBCUTANEOUS at 18:11

## 2024-06-24 RX ADMIN — NYSTATIN 500000 UNITS: 100000 SUSPENSION ORAL at 11:58

## 2024-06-24 RX ADMIN — ASPIRIN 81 MG CHEWABLE TABLET 81 MG: 81 TABLET CHEWABLE at 08:37

## 2024-06-24 RX ADMIN — URSODIOL 300 MG: 300 CAPSULE ORAL at 20:08

## 2024-06-24 RX ADMIN — PIPERACILLIN SODIUM AND TAZOBACTAM SODIUM 3.38 G: 3; .375 INJECTION, POWDER, LYOPHILIZED, FOR SOLUTION INTRAVENOUS at 13:50

## 2024-06-24 RX ADMIN — DEXTROSE MONOHYDRATE 0.02 MCG/KG/MIN: 50 INJECTION, SOLUTION INTRAVENOUS at 06:41

## 2024-06-24 RX ADMIN — POLYETHYLENE GLYCOL 3350 17 G: 17 POWDER, FOR SOLUTION ORAL at 08:36

## 2024-06-24 RX ADMIN — TACROLIMUS 2 MG: 1 CAPSULE ORAL at 18:08

## 2024-06-24 RX ADMIN — MYCOPHENOLATE MOFETIL 750 MG: 250 CAPSULE ORAL at 18:09

## 2024-06-24 ASSESSMENT — ACTIVITIES OF DAILY LIVING (ADL)
ADLS_ACUITY_SCORE: 32

## 2024-06-24 NOTE — PROGRESS NOTES
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 Saavedra, thrombocytopenia (~100s), hyponatremia, and ascites. Other history includes osteopenia, LE cellulitis, crohn's disease on infliximab, and obesity. She is now s/p DCD DDLT +biliary stent placement (on OCS x 19.4 hours) on 6/22/24 with Dr. Ball.     s/p DCD DDLT +stent 6/22/24: POD#2. LFTs continue to downtrend. Follow up US with patent vasculature.    - ASA 81 mg daily   - Ursodiol 300 BID   - Wean alprostadil gtt. Consider verapamil tablet     Immunosuppressed status secondary to medications:   Induction: Steroid taper per protocol.   Maintenance:    -  mg BID   - Tacrolimus 2 mg BID. Goal level 8-12.    - Prednisone 5 mg daily following taper.    Neuro:  Acute post op pain: prn oxycodone, prn dilaudid  Sleep: added melatonin    Hematology:   Acute blood loss anemia: Hgb 8-9, stable.   Chronic thrombocytopenia: r/t liver disease. PLT 71, monitor.     Cardiorespiratory: Remains on 2L NC. Consider diuresis with lasix 20mg given up 11kg in weight.     GI/Nutrition:   Moderate malnutrition in the context of chronic illness: Nutrition consulted. ADAT. Will add protein shakes     Endocrine:   Steroid induced hyperglycemia: continue insulin gtt.    Fluid/Electrolytes: Electrolyte replacements per ICU protocol.     : removed. Monitor UOP and Cr.     Infectious disease: No issues.     Prophylaxis: DVT, fall, GI, fungal (Fluconzole, nystatin), viral (Valcyte), pneumocystis (sulfa allergy, G6PD in process, continue dapsone), periop (Zosyn)    Disposition: SICU     DINESH/Fellow/Resident Provider: Tao Oneil MD    Faculty: SHANNAN Geronimo  __________________________________________________________________  Transplant History:    6/22/2024 (Liver), Postoperative day: 2      Interval History: History is obtained from the patient, spouse  Overnight events: No acute events overnight. Pain is overall well controlled. Reports poor sleep. Breathing comfortably on 2L NC. Tolerating clears. Passing gas, no BM.   ROS:   A 10-point review of systems was negative except as noted above.    Curent Meds:  Current Facility-Administered Medications   Medication Dose Route Frequency Provider Last Rate Last Admin    aspirin (ASA) chewable tablet 81 mg  81 mg Oral or Feeding Tube Daily Isaac Vargas MD   81 mg at 06/24/24 0837    dapsone (ACZONE) tablet 100 mg  100 mg Oral or Feeding Tube Daily Isaac Vargas MD   100 mg at 06/24/24 0832    magnesium oxide (MAG-OX) tablet 400 mg  400 mg Oral Q4H Pravin Ball MD   400 mg at 06/24/24 0836    [START ON 6/25/2024] methylPREDNISolone sodium succinate (solu-MEDROL) injection 50 mg  50 mg Intravenous Once Isaac Vargas MD        Followed by    [START ON 6/26/2024] predniSONE (DELTASONE) tablet 25 mg  25 mg Oral or Feeding Tube Once Isaac Vargas MD        Followed by    [START ON 6/27/2024] predniSONE (DELTASONE) tablet 10 mg  10 mg Oral or Feeding Tube Once Isaac Vargas MD        mycophenolate (GENERIC EQUIVALENT) capsule 750 mg  750 mg Oral BID IS Isaac Vargas MD   750 mg at 06/24/24 0834    nystatin (MYCOSTATIN) suspension 500,000 Units  500,000 Units Mouth/Throat 4x Daily Fern Cat MD   500,000 Units at 06/24/24 0839    pantoprazole (PROTONIX) EC tablet 40 mg  40 mg Oral QAM AC Pravin Ball MD   40 mg at 06/24/24 0837    piperacillin-tazobactam (ZOSYN) 3.375 g vial to attach to  mL bag  3.375 g Intravenous Q6H Isaac Vargas  mL/hr at 06/24/24 0827 3.375 g at 06/24/24 0827    polyethylene glycol (MIRALAX) Packet 17 g  17 g Oral or Feeding Tube BID Nader Dobbins MD        senna-docusate (SENOKOT-S/PERICOLACE) 8.6-50 MG per tablet 1-2 tablet  1-2 tablet Oral or Feeding Tube BID Philly Webb  TAMMI Becker   1 tablet at 06/24/24 0831    sodium chloride (PF) 0.9% PF flush 3 mL  3 mL Intravenous Q8H Isaac Vargas MD   3 mL at 06/23/24 1430    sodium chloride (PF) 0.9% PF flush 3 mL  3 mL Intracatheter Q8H Darcie Newby PA-C   3 mL at 06/24/24 0843    tacrolimus (GENERIC EQUIVALENT) capsule 2 mg  2 mg Oral BID IS Isaac Vargas MD   2 mg at 06/24/24 0834    ursodiol (ACTIGALL) capsule 300 mg  300 mg Oral or Feeding Tube BID Isaac Vargas MD   300 mg at 06/24/24 0832    valGANciclovir (VALCYTE) tablet 450 mg  450 mg Oral Daily Isaac Vargas MD   450 mg at 06/24/24 0837       Physical Exam:     Admit Weight: 77.3 kg (170 lb 8 oz)    Current Vitals:   /67 (BP Location: Right arm)   Pulse 104   Temp 97.9  F (36.6  C) (Axillary)   Resp 14   Wt 88.3 kg (194 lb 10.7 oz)   LMP 05/31/2006   SpO2 95%   BMI 32.39 kg/m      Vital sign ranges:    Temp:  [96.1  F (35.6  C)-98  F (36.7  C)] 97.9  F (36.6  C)  Pulse:  [] 104  Resp:  [14-16] 14  BP: ()/(55-90) 113/67  SpO2:  [91 %-97 %] 95 %    General Appearance: in no apparent distress.   Skin: normal, warm, dry, +jaundice  Heart: RRR  Lungs: unlabored on 2L NC  Abdomen: soft. Incision covered; C/D/I. JACEK with serosanguinous output.   : no Conti  Extremities: edema: generalized   Neurologic: A&Ox4. Tremor absent.     Frailty Scores          4/23/2024 2/4/2024 2/14/2023   Frailty Scores   Final Score Not Frail Not Frail Not Frail   Final Score Number 0 0 1      Details                   Data:   CMP  Recent Labs   Lab 06/24/24  0851 06/24/24  0629 06/24/24  0452 06/23/24  2220 06/23/24 2046 06/23/24  1453 06/23/24  1449 06/23/24  0538 06/23/24  0343 06/22/24  0821 06/20/24  1651   NA  --   --  137  --  135  --  137  --  139   < > 134*   POTASSIUM  --   --  3.8  --  3.9  --  4.3  --  4.4   < > 4.0   CHLORIDE  --   --  105  --  104  --  105  --  107   < > 102   CO2  --   --  23  --  23  --  22  --  22   < > 23   * 97 105*   < >  103*  100*   < > 151*   < > 148*   < > 101*   BUN  --   --  17.9  --  16.9  --  14.4  --  13.2   < > 11.0   CR  --   --  0.62  --  0.65  --  0.60  --  0.64   < > 0.77   GFRESTIMATED  --   --  >90  --  >90  --  >90  --  >90   < > 88   ARIEL  --   --  8.2*  --  8.3*  --  8.4*  --  8.7   < > 8.9   ICAW  --   --  4.6  --   --   --   --   --  4.9   < >  --    MAG  --   --  2.0  --   --   --   --   --  1.8   < > 1.9   PHOS  --   --  3.2  --   --   --   --   --  2.8   < > 3.2   AMYLASE  --   --   --   --   --   --   --   --  114*  --  124*   LIPASE  --   --   --   --   --   --   --   --  115*  --   --    ALBUMIN  --   --  3.5  --   --   --  3.7  --  3.8   < > 3.7   BILITOTAL  --   --  4.6*  --   --   --  6.7*  --  7.9*   < > 1.8*   ALKPHOS  --   --  112  --   --   --  98  --  88   < > 112   AST  --   --  924*  --   --   --  2,477*  --  7,100*   < > 71*   ALT  --   --  777*  --   --   --  922*  --  1,199*   < > 37    < > = values in this interval not displayed.     CBC  Recent Labs   Lab 06/24/24  0452 06/23/24  2046 06/22/24  1815 06/22/24  1613   HGB 8.7* 8.4*   < >  --    WBC 13.7* 11.3*   < >  --    PLT 71* 75*   < >  --    A1C  --   --   --  5.1    < > = values in this interval not displayed.

## 2024-06-24 NOTE — PROGRESS NOTES
Patient is A & O x4, pupils 2-3, up Ax1 to BSC, sinus rhythm/tach HR 90-100s, VSS, on 2L of O2 per NC when sleeping, sats well on RA when awake.  passing gas, no BM this shift, adequate UOP; c/o abdominal pain, tolerable with oxycodone and dilaudid, JACEK drain with SS output; insulin gtt discontinued., alalprostadil.gtt discontinued. Report given to 7A RN and sent to transplant unit via bed with  and daughter accompanying.

## 2024-06-24 NOTE — PROGRESS NOTES
{Smartlist below should be selected and completed by the supervising physician (resident, fellow or attending) present with the patient; ***}  {Resident, fellow or attending ATTESTATION:[id number]}    [facility]    Progress Note - SICU (Surgical ICU) Service       Date of Admission:  6/20/2024  Interval History   Pain:controlled with current regimen, Diet:Tolerating current   without nausea or vomiting, Bowels: Passing flatus but has not yet had a BM, and Ambulation:around room    Physical Exam   Vital Signs: Temp: 97.9  F (36.6  C) Temp src: Axillary BP: 113/67 Pulse: 104   Resp: 14 SpO2: 95 % O2 Device: Nasal cannula Oxygen Delivery: 2 LPM  Weight: 194 lbs 10.66 oz      Constitutional: Well appearing, not in any acute distress  Pulm: Not in any acute respiratory distress, breathing calmly  CV: nl rate, regular rhythm  Extremities: trace edema to bilateral lower extremities. 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 Saavedra, thrombocytopenia (~100s), hyponatremia and ascites. PMH also significant for stable tiny pulmonary nodule (3mm) at right major fissure on last CT 7/7/23, osteopenia, LE cellulitis, crohn's disease on infliximab and obesity. Now s/p DDLT 6/21/24 with Dr. Ball.         Changes to plan today:  - Advance diet as tolerated  - ALT/AST significantly improved today, okay to wean Alprostadil per transplant  - Floor status once  off Alprostadil  - Miralax ordered BID  - PT/OT       Data     I have personally reviewed the following data over the past 24 hrs:    13.7 (H)  \   8.7 (L)   / 71 (L)     137 105 17.9 /  135 (H)   3.8 23 0.62 \     ALT: 777 (HH) AST: 924 (HH) AP: 112 TBILI: 4.6 (H)   ALB: 3.5 TOT PROTEIN: 5.7 (L) LIPASE: N/A     INR:  1.74 (H) PTT:  N/A   D-dimer:  N/A Fibrinogen:  N/A       Imaging results reviewed over the past 24 hrs:   Recent Results (from the past 24 hour(s))   XR Chest Port 1 View    Narrative    Exam: XR CHEST PORT 1 VIEW, 2024 1:51 PM    Comparison: Radiograph 2024, CT chest 2024    History: R ij cvc exchange    Findings:  Portable AP view of the chest. Right IJ central venous catheter tip  projects over the high right atrium. Trachea is midline.  Cardiomediastinal silhouette is within normal limits. Mild streaky  bibasilar opacities, likely atelectasis. No pleural effusion or  pneumothorax. Visualized upper abdomen is unremarkable. No acute  osseous abnormality.      Impression    Impression:   1. Right IJ central venous catheter tip projects over the high right  atrium.  2. Mild streaky bibasilar atelectasis.    I have personally reviewed the examination and initial interpretation  and I agree with the findings.    JARETT ARRINGTON MD         SYSTEM ID:  [id number]     Procedure(s):  Transplant liver recipient  donor, with biliary stent placement  Bench liver on 2024 - 2024  Clinically Significant Risk Factors   { TIP  Diagnoses will continue to appear throughout the admission.  :[id number]}        # Hypercalcemia: corrected calcium is >10.1, will monitor as appropriate    # Hypoalbuminemia: Lowest albumin = 2.8 g/dL at 2024  1:55 PM, will monitor as appropriate  # Coagulation Defect: INR = 1.74 (Ref range: 0.85 - 1.15) and/or PTT = 45 Seconds (Ref range: 22 - 38 Seconds), will monitor for bleeding  # Thrombocytopenia: Lowest platelets = 50 in last 2 days, will monitor for  bleeding   # Hypertension: Noted on problem list           #Precipitous drop in Hgb/Hct: Lowest Hgb this hospitalization: 7.6 g/dL. Will continue to monitor and treat/transfuse as appropriate.      # Moderate Malnutrition: based on nutrition assessment, PRESENT ON ADMISSION          Disposition Plan   {TIP  The patient's Medical Readiness for Discharge [MRD] has been documented today or is 'Ready Now'. Last Documentation- Anticipated in 5+ Days   Use SmartList below if a change is needed.  :[id number]}  Expected Discharge Date: 06/29/2024                 The patient's care was discussed with the Attending Physician, Dr. Rivera .       Fer Chi  [facility]  All communications related to this note should be expressed to resident/DINESH on call for this team at the time of your communication.

## 2024-06-24 NOTE — PROGRESS NOTES
Final positive donor blood/sputum culture results have been uploaded to [employer].  Donor ID [id number]. Dr. Gandhi notified of results.

## 2024-06-24 NOTE — PROGRESS NOTES
Transplant Social Work Services Progress Note    Date of Initial Social Work Evaluation: 2023  Collaborated with: Patient at bedside.     Data: Virginia is s/p  donor liver transplant on 2024  Intervention: I met with Virginia at bedside and provided supportive check in. She is feeling good about her progress so far. I reviewed current recommendations for ARU, and indicated that she could progress to going home. She is hopeful to return home and would like referral to ARU if needed.   [facility]: Referral completed.   I met with patient at family at bedside later in the day. Patient was asleep. I provided ARU information to family with list of other facilities. I noted that if we refer out, the facilities will likely decline. I also reviewed patient's test claim and noted higher cost of one of the medications. Pt's spouse indicated that they received a phone call from Medicare indicating that they can appeal their discharge. I reviewed this information with them re: this process and Medicare notice at admit and discharge. He also voiced that they had trouble getting medications at discharge during other admissions.   Assessment: Patient is moving to the floor   Education provided by SENTHIL: See above.   Plan:    Discharge Plans in Progress: ARU pending progress. Could progress home    Barriers to d/c plan: Medical stability    Follow up Plan: This writer will follow for psychosocial needs and discharge to ARU/TCU if needed.      RODGER Newby, [facility]  Liver Transplant   [facility]  Phone: [phone]

## 2024-06-24 NOTE — PLAN OF CARE
Patient is A & O x4, pupils 2-3, up Ax1 to BSC, sinus rhythm/tach HR 90-100s, VSS, on 2L of O2 per NC, passing gas, no BM this shift, adequate UOP; c/o abdominal pain, tolerable with oxycodone and dilaudid, JACEK drain with SS output; insulin gtt, on algorithm 4, alprostadil at goal of 0.02.  Continue with current plan of care and notify MD as needed.    Zahra Kenyon RN

## 2024-06-24 NOTE — PLAN OF CARE
Vitals: /78   Pulse 105   Temp 97.6  F (36.4  C) (Oral)   Resp 16   Wt 88.3 kg (194 lb 10.7 oz)   LMP 05/31/2006   SpO2 97%   BMI 32.39 kg/m  '  Stable on 2 L NC.   Neuro : a x o x 4.   Blood glucose: achs. 161. Sliding scale.   Pain/nausea: denies.   Diet: regular.   Lines: TLIJ SL.   : voided x 1. Hat for charts.   GI: no bm, passing gas. Hypoactive bowel sounds.   Drains: JACEK OP R 70 ML. Bloody. Dressing cdi.    Skin: Admitted/transferred from:   Time of arrival on unit 1717  2 RN full  skin assessment completed by Tanya GARCIA & Lore MONTERROSO  Skin assessment finding: issues found eczema on her left foot, dry, scabby, not leaky. Incision staples clampshell anika. JACEK looks good.     Interventions/actions: other none. Bruises scattered on her upper extremities.     Mobility: up x 1 x walker.     LA 2.4. code sepsis called. Repeat LA @ 2000. No interventions. Pt not septic.

## 2024-06-24 NOTE — CODE/RAPID RESPONSE
Rapid Response Team Note    Assessment   A rapid response was called on Abiola Matute due to lactic acidosis. This presentation is likely due to steroids contributing to elevated WBC and mild dehydration. Patient feeling well without localizing symptoms.    Plan   -  Encouraged PO fluids  -  Recheck lactic at 2000  -  The  Transplant Surgery  primary team was paged and currently awaiting a response.  -  Disposition: The patient will remain on the current unit. We will continue to monitor this patient closely.  -  Reassessment and plan follow-up will be performed by the primary team    Crystal Rowland PA-C  Rapid Response Team DINESH  Securely message with [employer]     Medical Decision Making       30 MINUTES SPENT BY ME on the date of service doing chart review, history, exam, documentation & further activities per the note.      Hospital Course   Brief Summary of events leading to rapid response:   Triggered sepsis, lactic 2.4. Feeling well - denies any problems with her breathing, no significant abdominal pain, voiding okay, no diarrhea. Has been tolerating fluids. Was recently up and moving around prior to obtaining lactic acid.     Physical Exam   Vital Signs: Temp: 97.6  F (36.4  C) Temp src: Oral BP: 133/77 Pulse: 105   Resp: 20 SpO2: 97 % O2 Device: Nasal cannula Oxygen Delivery: 2 LPM  Weight: 194 lbs 10.66 oz    Exam:   General Appearance: Awake. Alert and oriented x3. No acute distress.   Respiratory: Normal work of breathing on room air. Lungs CTAB.   Cardiovascular: Regular, tachycardic. Pedal pulses 2+ Mild pitting edema.   GI: Abdomen non-distended. Normoactive. Soft, non-tender throughout.   Skin: Warm, dry.     Sepsis Evaluation   The patient is not known to have an infection.    NO EVIDENCE OF SEPSIS at this time.  Vital sign, physical exam, and lab findings are due to dehydration, recent activity, steroids.

## 2024-06-24 NOTE — PLAN OF CARE
Major Shift Events:    Neuro intact. In and out of chair X3, moves with assist of one managing lines. Oxy q4 for pain. Arterial line, both RICCs, and maxwell removed.   Alprostadil started advancing .002 per hour. Insulin drip remains alg 4.    Plan: Advance Alprostadil, transfer to floor with off drip.  For vital signs and complete assessments, please see documentation flowsheets.

## 2024-06-25 ENCOUNTER — APPOINTMENT (OUTPATIENT)
Dept: PHYSICAL THERAPY | Facility: CLINIC | Age: 59
DRG: 006 | End: 2024-06-25
Attending: TRANSPLANT SURGERY
Payer: MEDICARE

## 2024-06-25 LAB
ALBUMIN SERPL BCG-MCNC: 3.7 G/DL (ref 3.5–5.2)
ALP SERPL-CCNC: 124 U/L (ref 40–150)
ALT SERPL W P-5'-P-CCNC: 528 U/L (ref 0–50)
ANION GAP SERPL CALCULATED.3IONS-SCNC: 8 MMOL/L (ref 7–15)
AST SERPL W P-5'-P-CCNC: 206 U/L (ref 0–45)
BASOPHILS # BLD AUTO: 0 10E3/UL (ref 0–0.2)
BASOPHILS NFR BLD AUTO: 0 %
BILIRUB DIRECT SERPL-MCNC: 1.66 MG/DL (ref 0–0.3)
BILIRUB SERPL-MCNC: 2.7 MG/DL
BUN SERPL-MCNC: 30.3 MG/DL (ref 8–23)
CALCIUM SERPL-MCNC: 8.3 MG/DL (ref 8.6–10)
CHLORIDE SERPL-SCNC: 102 MMOL/L (ref 98–107)
CLOT INIT KAOL IND TO POST HEP NEUT TRTO: 1 {RATIO}
CLOT INIT KAOLIN IND BLD US: 144 SEC (ref 113–166)
CLOT INIT KAOLIN IND P HEP NEUT BLD US: 141 SEC (ref 103–153)
CLOT STIFF PLT CONT BLD CALC: 12 HPA (ref 11.9–29.8)
CLOT STIFF TF IND P HEP NEUT BLD US: 13.9 HPA (ref 13–33.2)
CLOT STIFF TF IND+IIB-IIIA INH P HEP NEU: 1.9 HPA (ref 1–3.7)
CREAT SERPL-MCNC: 0.91 MG/DL (ref 0.51–0.95)
DEPRECATED HCO3 PLAS-SCNC: 25 MMOL/L (ref 22–29)
EGFRCR SERPLBLD CKD-EPI 2021: 72 ML/MIN/1.73M2
EOSINOPHIL # BLD AUTO: 0 10E3/UL (ref 0–0.7)
EOSINOPHIL NFR BLD AUTO: 0 %
ERYTHROCYTE [DISTWIDTH] IN BLOOD BY AUTOMATED COUNT: 18.7 % (ref 10–15)
G6PD RBC-CCNT: 11 U/G HB
GLUCOSE BLDC GLUCOMTR-MCNC: 141 MG/DL (ref 70–99)
GLUCOSE BLDC GLUCOMTR-MCNC: 152 MG/DL (ref 70–99)
GLUCOSE BLDC GLUCOMTR-MCNC: 155 MG/DL (ref 70–99)
GLUCOSE BLDC GLUCOMTR-MCNC: 168 MG/DL (ref 70–99)
GLUCOSE BLDC GLUCOMTR-MCNC: 199 MG/DL (ref 70–99)
GLUCOSE BLDC GLUCOMTR-MCNC: 204 MG/DL (ref 70–99)
GLUCOSE BLDC GLUCOMTR-MCNC: 221 MG/DL (ref 70–99)
GLUCOSE SERPL-MCNC: 158 MG/DL (ref 70–99)
HCT VFR BLD AUTO: 23.1 % (ref 35–47)
HGB BLD-MCNC: 7.9 G/DL (ref 11.7–15.7)
HGB BLD-MCNC: 8.5 G/DL (ref 11.7–15.7)
IMM GRANULOCYTES # BLD: 0.1 10E3/UL
IMM GRANULOCYTES NFR BLD: 1 %
INR PPP: 1.45 (ref 0.85–1.15)
LYMPHOCYTES # BLD AUTO: 0.3 10E3/UL (ref 0.8–5.3)
LYMPHOCYTES NFR BLD AUTO: 4 %
MAGNESIUM SERPL-MCNC: 2.2 MG/DL (ref 1.7–2.3)
MCH RBC QN AUTO: 32.4 PG (ref 26.5–33)
MCHC RBC AUTO-ENTMCNC: 34.2 G/DL (ref 31.5–36.5)
MCV RBC AUTO: 95 FL (ref 78–100)
MONOCYTES # BLD AUTO: 0.3 10E3/UL (ref 0–1.3)
MONOCYTES NFR BLD AUTO: 4 %
NEUTROPHILS # BLD AUTO: 7.6 10E3/UL (ref 1.6–8.3)
NEUTROPHILS NFR BLD AUTO: 91 %
NRBC # BLD AUTO: 0 10E3/UL
NRBC BLD AUTO-RTO: 0 /100
PHOSPHATE SERPL-MCNC: 4 MG/DL (ref 2.5–4.5)
PLATELET # BLD AUTO: 77 10E3/UL (ref 150–450)
POTASSIUM SERPL-SCNC: 4.5 MMOL/L (ref 3.4–5.3)
PROT SERPL-MCNC: 5.8 G/DL (ref 6.4–8.3)
RBC # BLD AUTO: 2.44 10E6/UL (ref 3.8–5.2)
SODIUM SERPL-SCNC: 135 MMOL/L (ref 135–145)
TACROLIMUS BLD-MCNC: 20.9 UG/L (ref 5–15)
TME LAST DOSE: ABNORMAL H
TME LAST DOSE: ABNORMAL H
WBC # BLD AUTO: 8.4 10E3/UL (ref 4–11)

## 2024-06-25 PROCEDURE — 250N000013 HC RX MED GY IP 250 OP 250 PS 637: Performed by: SURGERY

## 2024-06-25 PROCEDURE — 82374 ASSAY BLOOD CARBON DIOXIDE: CPT | Performed by: SURGERY

## 2024-06-25 PROCEDURE — 250N000013 HC RX MED GY IP 250 OP 250 PS 637: Performed by: NURSE PRACTITIONER

## 2024-06-25 PROCEDURE — 250N000013 HC RX MED GY IP 250 OP 250 PS 637

## 2024-06-25 PROCEDURE — 97530 THERAPEUTIC ACTIVITIES: CPT | Mod: GP

## 2024-06-25 PROCEDURE — 250N000013 HC RX MED GY IP 250 OP 250 PS 637: Performed by: PHYSICIAN ASSISTANT

## 2024-06-25 PROCEDURE — 250N000011 HC RX IP 250 OP 636: Mod: JZ | Performed by: SURGERY

## 2024-06-25 PROCEDURE — 84100 ASSAY OF PHOSPHORUS: CPT | Performed by: SURGERY

## 2024-06-25 PROCEDURE — 250N000012 HC RX MED GY IP 250 OP 636 PS 637: Performed by: SURGERY

## 2024-06-25 PROCEDURE — 85610 PROTHROMBIN TIME: CPT | Performed by: SURGERY

## 2024-06-25 PROCEDURE — 250N000013 HC RX MED GY IP 250 OP 250 PS 637: Performed by: TRANSPLANT SURGERY

## 2024-06-25 PROCEDURE — 36592 COLLECT BLOOD FROM PICC: CPT | Performed by: NURSE PRACTITIONER

## 2024-06-25 PROCEDURE — 85025 COMPLETE CBC W/AUTO DIFF WBC: CPT | Performed by: SURGERY

## 2024-06-25 PROCEDURE — 82248 BILIRUBIN DIRECT: CPT | Performed by: SURGERY

## 2024-06-25 PROCEDURE — 80197 ASSAY OF TACROLIMUS: CPT | Performed by: NURSE PRACTITIONER

## 2024-06-25 PROCEDURE — 83735 ASSAY OF MAGNESIUM: CPT | Performed by: SURGERY

## 2024-06-25 PROCEDURE — 97116 GAIT TRAINING THERAPY: CPT | Mod: GP

## 2024-06-25 PROCEDURE — 36592 COLLECT BLOOD FROM PICC: CPT | Performed by: SURGERY

## 2024-06-25 PROCEDURE — 120N000011 HC R&B TRANSPLANT UMMC

## 2024-06-25 PROCEDURE — 85018 HEMOGLOBIN: CPT | Performed by: NURSE PRACTITIONER

## 2024-06-25 RX ORDER — DAPSONE 25 MG/1
50 TABLET ORAL DAILY
Status: DISCONTINUED | OUTPATIENT
Start: 2024-06-26 | End: 2024-06-28 | Stop reason: HOSPADM

## 2024-06-25 RX ORDER — TACROLIMUS 1 MG/1
1 CAPSULE ORAL
Status: DISCONTINUED | OUTPATIENT
Start: 2024-06-26 | End: 2024-06-28

## 2024-06-25 RX ORDER — OXYCODONE HYDROCHLORIDE 5 MG/1
5 TABLET ORAL EVERY 4 HOURS PRN
Status: DISCONTINUED | OUTPATIENT
Start: 2024-06-25 | End: 2024-06-27

## 2024-06-25 RX ORDER — NYSTATIN 100000/ML
500000 SUSPENSION, ORAL (FINAL DOSE FORM) ORAL 4 TIMES DAILY
Status: DISCONTINUED | OUTPATIENT
Start: 2024-06-25 | End: 2024-06-28 | Stop reason: HOSPADM

## 2024-06-25 RX ORDER — BISACODYL 10 MG
10 SUPPOSITORY, RECTAL RECTAL ONCE
Status: COMPLETED | OUTPATIENT
Start: 2024-06-25 | End: 2024-06-25

## 2024-06-25 RX ORDER — METHYLPREDNISOLONE SODIUM SUCCINATE 125 MG/2ML
INJECTION, POWDER, LYOPHILIZED, FOR SOLUTION INTRAMUSCULAR; INTRAVENOUS
Status: COMPLETED
Start: 2024-06-25 | End: 2024-06-25

## 2024-06-25 RX ORDER — PANTOPRAZOLE SODIUM 40 MG/1
40 TABLET, DELAYED RELEASE ORAL DAILY
Status: DISCONTINUED | OUTPATIENT
Start: 2024-06-26 | End: 2024-06-28 | Stop reason: HOSPADM

## 2024-06-25 RX ADMIN — OXYCODONE HYDROCHLORIDE 5 MG: 5 TABLET ORAL at 08:13

## 2024-06-25 RX ADMIN — DAPSONE 100 MG: 100 TABLET ORAL at 08:14

## 2024-06-25 RX ADMIN — INSULIN ASPART 1 UNITS: 100 INJECTION, SOLUTION INTRAVENOUS; SUBCUTANEOUS at 08:20

## 2024-06-25 RX ADMIN — VALGANCICLOVIR HYDROCHLORIDE 450 MG: 450 TABLET ORAL at 08:14

## 2024-06-25 RX ADMIN — INSULIN ASPART 2 UNITS: 100 INJECTION, SOLUTION INTRAVENOUS; SUBCUTANEOUS at 17:30

## 2024-06-25 RX ADMIN — NYSTATIN 500000 UNITS: 100000 SUSPENSION ORAL at 17:31

## 2024-06-25 RX ADMIN — DOCUSATE SODIUM 50 MG AND SENNOSIDES 8.6 MG 1 TABLET: 8.6; 5 TABLET, FILM COATED ORAL at 08:14

## 2024-06-25 RX ADMIN — POLYETHYLENE GLYCOL 3350 17 G: 17 POWDER, FOR SOLUTION ORAL at 08:14

## 2024-06-25 RX ADMIN — NYSTATIN 500000 UNITS: 100000 SUSPENSION ORAL at 08:14

## 2024-06-25 RX ADMIN — MYCOPHENOLATE MOFETIL 750 MG: 250 CAPSULE ORAL at 08:14

## 2024-06-25 RX ADMIN — URSODIOL 300 MG: 300 CAPSULE ORAL at 08:14

## 2024-06-25 RX ADMIN — MYCOPHENOLATE MOFETIL 750 MG: 250 CAPSULE ORAL at 17:34

## 2024-06-25 RX ADMIN — INSULIN ASPART 1 UNITS: 100 INJECTION, SOLUTION INTRAVENOUS; SUBCUTANEOUS at 11:34

## 2024-06-25 RX ADMIN — TACROLIMUS 2 MG: 1 CAPSULE ORAL at 08:14

## 2024-06-25 RX ADMIN — NYSTATIN 500000 UNITS: 100000 SUSPENSION ORAL at 19:50

## 2024-06-25 RX ADMIN — POLYETHYLENE GLYCOL 3350 17 G: 17 POWDER, FOR SOLUTION ORAL at 19:50

## 2024-06-25 RX ADMIN — METHYLPREDNISOLONE SODIUM SUCCINATE 50 MG: 125 INJECTION, POWDER, FOR SOLUTION INTRAMUSCULAR; INTRAVENOUS at 08:16

## 2024-06-25 RX ADMIN — Medication 25 MG: at 21:54

## 2024-06-25 RX ADMIN — BISACODYL 10 MG: 10 SUPPOSITORY RECTAL at 14:22

## 2024-06-25 RX ADMIN — PANTOPRAZOLE SODIUM 40 MG: 40 TABLET, DELAYED RELEASE ORAL at 08:14

## 2024-06-25 RX ADMIN — OXYCODONE HYDROCHLORIDE 5 MG: 5 TABLET ORAL at 21:54

## 2024-06-25 RX ADMIN — URSODIOL 300 MG: 300 CAPSULE ORAL at 19:50

## 2024-06-25 RX ADMIN — ASPIRIN 81 MG CHEWABLE TABLET 81 MG: 81 TABLET CHEWABLE at 08:14

## 2024-06-25 ASSESSMENT — ACTIVITIES OF DAILY LIVING (ADL)
ADLS_ACUITY_SCORE: 32

## 2024-06-25 NOTE — PROGRESS NOTES
Transplant Surgery  Inpatient Daily Progress Note  06/25/2024    Assessment & Plan: Abiola Matute is a 58 year old female with a history of  cirrhosis 2/2 DUNN/EtOH complicated by HCC (3.2 cm on seg 7) s/p MWA on 8/29/23 with Dr. Saavedra, thrombocytopenia (~100s), hyponatremia, and ascites. Other history includes osteopenia, LE cellulitis, crohn's disease on infliximab, and obesity. She is now s/p DCD DDLT +biliary stent placement (on OCS x 19.4 hours) on 6/22/24 with Dr. Ball.     s/p DCD DDLT +stent 6/22/24: POD#2. LFTs continue to downtrend. Follow up US with patent vasculature.    - ASA 81 mg daily   - Ursodiol 300 BID   - Weaned off alprostadil gtt on 6/24. Normotensive since, will consider verapamil tablet if HTN    Immunosuppressed status secondary to medications:   Induction: Steroid taper per protocol.   Maintenance:    -  mg BID   - Tacrolimus 2 mg BID. Goal level 8-12. --> supratherapeutic to 20.9 (11.75hr) so will hold PM dose and decrease to 1 BID   - Prednisone 5 mg daily following taper.    Neuro:  Acute post op pain: prn oxycodone, stop dilaudid  Sleep: trazodone qhs    Hematology:   Acute blood loss anemia: Hgb 8-9, rechecked and stable.   Chronic thrombocytopenia: r/t liver disease. PLT 77, monitor.     Cardiorespiratory: Remains on 1L NC. Monitor need for further diuresis    GI/Nutrition:   Moderate malnutrition in the context of chronic illness: Nutrition consulted. Regular diet, protein supplements     Endocrine:   Steroid induced hyperglycemia: off insulin gtt, sliding scale    Fluid/Electrolytes: monitor for need for electrolyte replacement      : removed. Monitor UOP and Cr.     Infectious disease: No issues.     Prophylaxis: DVT, fall, GI, fungal (Fluconzole, nystatin), viral (Valcyte), pneumocystis (sulfa allergy, G6PD in process, continue dapsone), periop (Zosyn)    Disposition: SICU     DINESH/Fellow/Resident Provider: Tao Oneil MD    Faculty: Juanpablo  [email or web address].  __________________________________________________________________  Transplant History:    6/22/2024 (Liver), Postoperative day: 3     Interval History: History is obtained from the patient, spouse  Overnight events: No acute events overnight. Pain is overall well controlled, better today     ROS:   A 10-point review of systems was negative except as noted above.    Curent Meds:  Current Facility-Administered Medications   Medication Dose Route Frequency Provider Last Rate Last Admin    aspirin (ASA) chewable tablet 81 mg  81 mg Oral or Feeding Tube Daily Isaac Vargas MD   81 mg at 06/25/24 0814    bisacodyl (DULCOLAX) suppository 10 mg  10 mg Rectal Once Miguelina Weinstein, APRN CNP        [START ON 6/26/2024] dapsone (ACZONE) tablet 50 mg  50 mg Oral or Feeding Tube Daily Pravin Ball MD        insulin aspart (NovoLOG) injection (RAPID ACTING)  1-7 Units Subcutaneous TID AC Jeb Blackwell MD   1 Units at 06/25/24 1134    insulin aspart (NovoLOG) injection (RAPID ACTING)  1-5 Units Subcutaneous At Bedtime Jeb Blackwell MD        mycophenolate (GENERIC EQUIVALENT) capsule 750 mg  750 mg Oral BID IS Isaac Vragas MD   750 mg at 06/25/24 0814    nystatin (MYCOSTATIN) suspension 500,000 Units  500,000 Units Swish & Swallow 4x Daily Pravin Ball MD        [START ON 6/26/2024] pantoprazole (PROTONIX) EC tablet 40 mg  40 mg Oral Daily Pravin Ball MD        polyethylene glycol (MIRALAX) Packet 17 g  17 g Oral or Feeding Tube BID Nader Dobbins MD   17 g at 06/25/24 0814    [START ON 6/26/2024] predniSONE (DELTASONE) tablet 25 mg  25 mg Oral or Feeding Tube Once Isaac Vargas MD        Followed by    [START ON 6/27/2024] predniSONE (DELTASONE) tablet 10 mg  10 mg Oral or Feeding Tube Once Isaac Vargas MD        [START ON 6/28/2024] predniSONE (DELTASONE) tablet 5 mg  5 mg Oral or Feeding Tube Daily Tao Oneil MD        senna-docusate  (SENOKOT-S/PERICOLACE) 8.6-50 MG per tablet 1-2 tablet  1-2 tablet Oral or Feeding Tube BID Philly Webb NP   1 tablet at 06/25/24 0814    sodium chloride (PF) 0.9% PF flush 3 mL  3 mL Intravenous Q8H Isaac Vargas MD   3 mL at 06/25/24 0613    [START ON 6/26/2024] tacrolimus (GENERIC EQUIVALENT) capsule 1 mg  1 mg Oral BID IS Miguelina Weinstein APRN CNP        traZODone (DESYREL) half-tab 25 mg  25 mg Oral At Bedtime Philly Webb NP   25 mg at 06/24/24 2247    Or    traZODone (DESYREL) tablet 50 mg  50 mg Oral At Bedtime Philly Webb NP        ursodiol (ACTIGALL) capsule 300 mg  300 mg Oral or Feeding Tube BID Isaac Vargas MD   300 mg at 06/25/24 0814    valGANciclovir (VALCYTE) tablet 450 mg  450 mg Oral Daily Isaac Vargas MD   450 mg at 06/25/24 0814       Physical Exam:     Admit Weight: 77.3 kg (170 lb 8 oz)    Current Vitals:   /74 (BP Location: Left arm)   Pulse 94   Temp 97.7  F (36.5  C) (Oral)   Resp 16   Wt 88.3 kg (194 lb 10.7 oz)   LMP 05/31/2006   SpO2 92%   BMI 32.39 kg/m      Vital sign ranges:    Temp:  [97.5  F (36.4  C)-98  F (36.7  C)] 97.7  F (36.5  C)  Pulse:  [] 94  Resp:  [16-20] 16  BP: (112-133)/(72-81) 123/74  SpO2:  [92 %-98 %] 92 %    General Appearance: in no apparent distress.   Skin: normal, warm, dry  Heart: RRR  Lungs: unlabored on 1L NC  Abdomen: soft. Incision covered; C/D/I. JACEK with serosanguinous output.   : no Conti  Extremities: edema: improved  Neurologic: A&Ox4. Tremor absent.     Frailty Scores          4/23/2024 2/4/2024 2/14/2023   Frailty Scores   Final Score Not Frail Not Frail Not Frail   Final Score Number 0 0 1      Details                   Data:   CMP  Recent Labs   Lab 06/25/24  1231 06/25/24  1133 06/25/24  0820 06/25/24  0550 06/24/24  0629 06/24/24  0452 06/23/24  0538 06/23/24  0343 06/22/24  0821 06/20/24  1651   NA  --   --   --  135  --  137   < > 139   < > 134*   POTASSIUM  --   --   --  4.5  --   3.8   < > 4.4   < > 4.0   CHLORIDE  --   --   --  102  --  105   < > 107   < > 102   CO2  --   --   --  25  --  23   < > 22   < > 23   * 141*   < > 158*   < > 105*   < > 148*   < > 101*   BUN  --   --   --  30.3*  --  17.9   < > 13.2   < > 11.0   CR  --   --   --  0.91  --  0.62   < > 0.64   < > 0.77   GFRESTIMATED  --   --   --  72  --  >90   < > >90   < > 88   ARIEL  --   --   --  8.3*  --  8.2*   < > 8.7   < > 8.9   ICAW  --   --   --   --   --  4.6  --  4.9   < >  --    MAG  --   --   --  2.2  --  2.0  --  1.8   < > 1.9   PHOS  --   --   --  4.0  --  3.2  --  2.8   < > 3.2   AMYLASE  --   --   --   --   --   --   --  114*  --  124*   LIPASE  --   --   --   --   --   --   --  115*  --   --    ALBUMIN  --   --   --  3.7  --  3.5   < > 3.8   < > 3.7   BILITOTAL  --   --   --  2.7*  --  4.6*   < > 7.9*   < > 1.8*   ALKPHOS  --   --   --  124  --  112   < > 88   < > 112   AST  --   --   --  206*  --  924*   < > 7,100*   < > 71*   ALT  --   --   --  528*  --  777*   < > 1,199*   < > 37    < > = values in this interval not displayed.     CBC  Recent Labs   Lab 06/25/24  1149 06/25/24  0550 06/24/24  0452 06/22/24  1815 06/22/24  1613   HGB 8.5* 7.9* 8.7*   < >  --    WBC  --  8.4 13.7*   < >  --    PLT  --  77* 71*   < >  --    A1C  --   --   --   --  5.1    < > = values in this interval not displayed.

## 2024-06-25 NOTE — PLAN OF CARE
Problem: Adult Inpatient Plan of Care  Goal: Optimal Comfort and Wellbeing  Intervention: Provide Person-Centered Care  Recent Flowsheet Documentation  Taken 6/25/2024 1700 by Hazel Plata RN  Trust Relationship/Rapport:   care explained   choices provided   thoughts/feelings acknowledged  Taken 6/25/2024 0900 by Hazel Plata RN  Trust Relationship/Rapport:   care explained   choices provided   thoughts/feelings acknowledged   Goal Outcome Evaluation:

## 2024-06-25 NOTE — PLAN OF CARE
Goal Outcome Evaluation:      /72 (BP Location: Left arm)   Pulse 94   Temp 98  F (36.7  C) (Oral)   Resp 16   Wt 88.3 kg (194 lb 10.7 oz)   LMP 2006   SpO2 98%   BMI 32.39 kg/m      Shift: [id number]  Isolation Status: NA  VSS on RA, afebrile  Neuro: Aox4  Behaviors: Pleasant & cooperative  B  Labs: hgb 7.9    Respiratory: WNL  Cardiac: WNL  Pain/Nausea: managed well with PRN meds  PRN: Oxycodone  Diet: Regular  IV Access: IJ  Infusion(s): NA  Lines/Drains: JACEK  GI/: Voiding adequately, LBM 6/25, small, passing gas.  Skin: No new concerns  Mobility: Assist x1 w/walker   Plan: POC, update team PRN

## 2024-06-25 NOTE — PLAN OF CARE
Goal Outcome Evaluation:      Plan of Care Reviewed With: patient    Overall Patient Progress: improving  /72 (BP Location: Left arm)   Pulse 94   Temp 98  F (36.7  C) (Oral)   Resp 16   Wt 88.3 kg (194 lb 10.7 oz)   LMP 05/31/2006   SpO2 97%   BMI 32.39 kg/m      Shift: 4027-8597  Isolation Status: standard  VS: stable on 2L NC, afebrile  Neuro: Aox4  Behaviors: Calm and cooperative  BG: ACHS: 192, 168  Labs/Imaging: Lactic acid: 0.9  Respiratory: WDL  Cardiac: tachycardic 's  Pain/Nausea: denies pain and nausea  PRN: none given  Diet: regular  IV Access: R internal jugular SL  Infusion(s): Albumin  Lines/Drains: R JACEK with 25mL bright red output  GI/: voiding spontaneously saving in the hat. Passing gas, no post op BM.  Skin: clamshell incision, stapled, KRISTIAN  Mobility: assist of 1, gb, walker  Events/Education: pt ceiling leaking, maintenance called in immediately, pt moved to hallway, pt moved to clean bed 7216.  Plan: Continue with plan of care, will notify MD with any changes.

## 2024-06-25 NOTE — PROGRESS NOTES
Care Management Follow Up    Length of Stay (days): 3    Expected Discharge Date: 06/28/2024     Concerns to be Addressed: discharge planning     Patient plan of care discussed at interdisciplinary rounds: Yes    Anticipated Discharge Disposition: Home, Home Care, Acute Rehab     Anticipated Discharge Services: Home Care  Anticipated Discharge DME:  (TBD)    Patient/family educated on Medicare website which has current facility and service quality ratings: no  Education Provided on the Discharge Plan:    Patient/Family in Agreement with the Plan: yes    Referrals Placed by CM/SW: Internal Clinic Care Coordination, Homecare, Post Acute Facilities  Private pay costs discussed: Not applicable    Additional Information:  Patient s/p liver transplant. Anticipate discharge  ARU vs home with home care.       Met with pt and spouse.  Introduced RNCC role.  Reviewed anticipated plan for discharge, transplant labs M/TH, home care vs ARU/TCU.    Pt spouse notes concern with residing in split level home.  Pt would need to be able to safely manage stairs.  Pt has had home infusion services in the past but not home care services.  Discussed home care RN/PT/OT.  Discussed  [facility].   Pt and spouse hopeful for discharge to home.   Agreed to follow up with pt and spouse latter part of the week.  Plan to initiate home care referral to [facility].  Pt and spouse would like to meet with transplant SW to discuss medications and cost.  Gina Yang, Transplant SW.  Per pt spouse they would like their daughter's Vivienne involved in Transplant Pharmacy Education.       Susan Patel, RN BSN, PHN, ACM-RN  2/12/2024  Nurse Coordinator    Phone: [phone]    Social Work and Care Management Department     SEARCHABLE in [facility] - search CARE COORDINATOR     [place] ([phone]) Saturday & Sunday; ([phone]) [facility] Recognized Holidays     Units: 5A Onc Vocera & 5C Vocera     Units: 6B Vocera & 6C  Vocera      Units: 7A SOT RNCC Vocera, 7B Med Surg Vocera, & 7C Med Surg Vocera      Units: 6A Vocera & 4A CVICU Vocera, 4C MICU Vocera, and 4E SICU Vocera      Units: 5 Ortho Vocera & 5 Med Surg Vocera      Units: 6 Med Surg Vocera & 8 Med Surg Vocera      6/25/2024

## 2024-06-26 ENCOUNTER — APPOINTMENT (OUTPATIENT)
Dept: OCCUPATIONAL THERAPY | Facility: CLINIC | Age: 59
DRG: 006 | End: 2024-06-26
Attending: TRANSPLANT SURGERY
Payer: MEDICARE

## 2024-06-26 LAB
ALBUMIN SERPL BCG-MCNC: 3.5 G/DL (ref 3.5–5.2)
ALP SERPL-CCNC: 138 U/L (ref 40–150)
ALT SERPL W P-5'-P-CCNC: 401 U/L (ref 0–50)
ANION GAP SERPL CALCULATED.3IONS-SCNC: 8 MMOL/L (ref 7–15)
AST SERPL W P-5'-P-CCNC: 85 U/L (ref 0–45)
BILIRUB DIRECT SERPL-MCNC: 1.37 MG/DL (ref 0–0.3)
BILIRUB SERPL-MCNC: 2.2 MG/DL
BUN SERPL-MCNC: 39.4 MG/DL (ref 8–23)
CALCIUM SERPL-MCNC: 8.2 MG/DL (ref 8.6–10)
CHLORIDE SERPL-SCNC: 100 MMOL/L (ref 98–107)
CREAT SERPL-MCNC: 1.04 MG/DL (ref 0.51–0.95)
DEPRECATED HCO3 PLAS-SCNC: 24 MMOL/L (ref 22–29)
EGFRCR SERPLBLD CKD-EPI 2021: 62 ML/MIN/1.73M2
ERYTHROCYTE [DISTWIDTH] IN BLOOD BY AUTOMATED COUNT: 17.8 % (ref 10–15)
GLUCOSE BLDC GLUCOMTR-MCNC: 129 MG/DL (ref 70–99)
GLUCOSE BLDC GLUCOMTR-MCNC: 140 MG/DL (ref 70–99)
GLUCOSE BLDC GLUCOMTR-MCNC: 147 MG/DL (ref 70–99)
GLUCOSE BLDC GLUCOMTR-MCNC: 180 MG/DL (ref 70–99)
GLUCOSE BLDC GLUCOMTR-MCNC: 182 MG/DL (ref 70–99)
GLUCOSE SERPL-MCNC: 142 MG/DL (ref 70–99)
HCT VFR BLD AUTO: 24.8 % (ref 35–47)
HGB BLD-MCNC: 8.4 G/DL (ref 11.7–15.7)
INR PPP: 1.23 (ref 0.85–1.15)
MAGNESIUM SERPL-MCNC: 2.3 MG/DL (ref 1.7–2.3)
MCH RBC QN AUTO: 32.3 PG (ref 26.5–33)
MCHC RBC AUTO-ENTMCNC: 33.9 G/DL (ref 31.5–36.5)
MCV RBC AUTO: 95 FL (ref 78–100)
PHOSPHATE SERPL-MCNC: 3 MG/DL (ref 2.5–4.5)
PLATELET # BLD AUTO: 86 10E3/UL (ref 150–450)
POTASSIUM SERPL-SCNC: 4.2 MMOL/L (ref 3.4–5.3)
PROT SERPL-MCNC: 5.9 G/DL (ref 6.4–8.3)
RBC # BLD AUTO: 2.6 10E6/UL (ref 3.8–5.2)
SODIUM SERPL-SCNC: 132 MMOL/L (ref 135–145)
WBC # BLD AUTO: 10.5 10E3/UL (ref 4–11)

## 2024-06-26 PROCEDURE — 120N000011 HC R&B TRANSPLANT UMMC

## 2024-06-26 PROCEDURE — 36592 COLLECT BLOOD FROM PICC: CPT | Performed by: SURGERY

## 2024-06-26 PROCEDURE — 80053 COMPREHEN METABOLIC PANEL: CPT | Performed by: SURGERY

## 2024-06-26 PROCEDURE — 82248 BILIRUBIN DIRECT: CPT | Performed by: SURGERY

## 2024-06-26 PROCEDURE — 85610 PROTHROMBIN TIME: CPT | Performed by: SURGERY

## 2024-06-26 PROCEDURE — 250N000012 HC RX MED GY IP 250 OP 636 PS 637: Performed by: SURGERY

## 2024-06-26 PROCEDURE — 97535 SELF CARE MNGMENT TRAINING: CPT | Mod: GO | Performed by: OCCUPATIONAL THERAPIST

## 2024-06-26 PROCEDURE — 250N000013 HC RX MED GY IP 250 OP 250 PS 637: Performed by: TRANSPLANT SURGERY

## 2024-06-26 PROCEDURE — 250N000013 HC RX MED GY IP 250 OP 250 PS 637: Performed by: SURGERY

## 2024-06-26 PROCEDURE — 84100 ASSAY OF PHOSPHORUS: CPT | Performed by: SURGERY

## 2024-06-26 PROCEDURE — 83735 ASSAY OF MAGNESIUM: CPT | Performed by: SURGERY

## 2024-06-26 PROCEDURE — 250N000013 HC RX MED GY IP 250 OP 250 PS 637

## 2024-06-26 PROCEDURE — 250N000012 HC RX MED GY IP 250 OP 636 PS 637: Performed by: NURSE PRACTITIONER

## 2024-06-26 PROCEDURE — 250N000013 HC RX MED GY IP 250 OP 250 PS 637: Performed by: NURSE PRACTITIONER

## 2024-06-26 PROCEDURE — 999N000248 HC STATISTIC IV INSERT WITH US BY RN

## 2024-06-26 PROCEDURE — 250N000011 HC RX IP 250 OP 636: Mod: JZ | Performed by: NURSE PRACTITIONER

## 2024-06-26 PROCEDURE — 85027 COMPLETE CBC AUTOMATED: CPT | Performed by: NURSE PRACTITIONER

## 2024-06-26 RX ORDER — FUROSEMIDE 10 MG/ML
20 INJECTION INTRAMUSCULAR; INTRAVENOUS ONCE
Status: COMPLETED | OUTPATIENT
Start: 2024-06-26 | End: 2024-06-26

## 2024-06-26 RX ADMIN — INSULIN ASPART 1 UNITS: 100 INJECTION, SOLUTION INTRAVENOUS; SUBCUTANEOUS at 08:16

## 2024-06-26 RX ADMIN — DAPSONE 50 MG: 25 TABLET ORAL at 08:16

## 2024-06-26 RX ADMIN — TRAZODONE HYDROCHLORIDE 50 MG: 50 TABLET ORAL at 22:22

## 2024-06-26 RX ADMIN — TACROLIMUS 1 MG: 1 CAPSULE ORAL at 08:15

## 2024-06-26 RX ADMIN — OXYCODONE HYDROCHLORIDE 5 MG: 5 TABLET ORAL at 03:07

## 2024-06-26 RX ADMIN — OXYCODONE HYDROCHLORIDE 5 MG: 5 TABLET ORAL at 22:20

## 2024-06-26 RX ADMIN — TACROLIMUS 1 MG: 1 CAPSULE ORAL at 17:31

## 2024-06-26 RX ADMIN — NYSTATIN 500000 UNITS: 100000 SUSPENSION ORAL at 17:31

## 2024-06-26 RX ADMIN — NYSTATIN 500000 UNITS: 100000 SUSPENSION ORAL at 20:42

## 2024-06-26 RX ADMIN — DOCUSATE SODIUM 50 MG AND SENNOSIDES 8.6 MG 1 TABLET: 8.6; 5 TABLET, FILM COATED ORAL at 08:16

## 2024-06-26 RX ADMIN — ASPIRIN 81 MG CHEWABLE TABLET 81 MG: 81 TABLET CHEWABLE at 08:15

## 2024-06-26 RX ADMIN — URSODIOL 300 MG: 300 CAPSULE ORAL at 08:16

## 2024-06-26 RX ADMIN — PANTOPRAZOLE SODIUM 40 MG: 40 TABLET, DELAYED RELEASE ORAL at 08:16

## 2024-06-26 RX ADMIN — FUROSEMIDE 20 MG: 10 INJECTION, SOLUTION INTRAMUSCULAR; INTRAVENOUS at 11:05

## 2024-06-26 RX ADMIN — INSULIN ASPART 1 UNITS: 100 INJECTION, SOLUTION INTRAVENOUS; SUBCUTANEOUS at 17:33

## 2024-06-26 RX ADMIN — POLYETHYLENE GLYCOL 3350 17 G: 17 POWDER, FOR SOLUTION ORAL at 08:15

## 2024-06-26 RX ADMIN — OXYCODONE HYDROCHLORIDE 5 MG: 5 TABLET ORAL at 17:31

## 2024-06-26 RX ADMIN — PREDNISONE 25 MG: 20 TABLET ORAL at 08:15

## 2024-06-26 RX ADMIN — OXYCODONE HYDROCHLORIDE 5 MG: 5 TABLET ORAL at 08:16

## 2024-06-26 RX ADMIN — DOCUSATE SODIUM 50 MG AND SENNOSIDES 8.6 MG 1 TABLET: 8.6; 5 TABLET, FILM COATED ORAL at 20:42

## 2024-06-26 RX ADMIN — MYCOPHENOLATE MOFETIL 750 MG: 250 CAPSULE ORAL at 17:31

## 2024-06-26 RX ADMIN — URSODIOL 300 MG: 300 CAPSULE ORAL at 20:42

## 2024-06-26 RX ADMIN — NYSTATIN 500000 UNITS: 100000 SUSPENSION ORAL at 08:15

## 2024-06-26 RX ADMIN — VALGANCICLOVIR HYDROCHLORIDE 450 MG: 450 TABLET ORAL at 08:15

## 2024-06-26 RX ADMIN — MYCOPHENOLATE MOFETIL 750 MG: 250 CAPSULE ORAL at 08:16

## 2024-06-26 RX ADMIN — NYSTATIN 500000 UNITS: 100000 SUSPENSION ORAL at 11:05

## 2024-06-26 ASSESSMENT — ACTIVITIES OF DAILY LIVING (ADL)
ADLS_ACUITY_SCORE: 32

## 2024-06-26 NOTE — PLAN OF CARE
Goal Outcome Evaluation:       /81 (BP Location: Left arm)   Pulse 94   Temp 98.6  F (37  C) (Oral)   Resp 16   Wt 88.3 kg (194 lb 10.7 oz)   LMP 2006   SpO2 93%   BMI 32.39 kg/m      Shift: 8737-5954  Isolation Status: na  VSS on 1 L, working on weaning O2 afebrile  Neuro: Aox4  Behaviors: pleasant & cooperative  B  Labs: LFTs improving  Respiratory: WNL   Cardiac: WNL  Pain/Nausea: Managed with Oxycodone  Diet: Regular  IV Access: IJ  Infusion(s): NA  Lines/Drains: JACEK  GI/: voiding, LBM   Skin: No new orders  Mobility: SBA  Plan: POC, update team PRN

## 2024-06-26 NOTE — PLAN OF CARE
Problem: Adult Inpatient Plan of Care  Goal: Optimal Comfort and Wellbeing  Outcome: Progressing  Intervention: Provide Person-Centered Care  Recent Flowsheet Documentation  Taken 6/26/2024 1000 by Hazel Plata RN  Trust Relationship/Rapport:   care explained   choices provided   thoughts/feelings acknowledged   Goal Outcome Evaluation:

## 2024-06-26 NOTE — PROGRESS NOTES
Transplant Surgery  Inpatient Daily Progress Note  2024    Assessment & Plan: Abiola Matute is a 58 year old female with a history of  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 Saavedra, thrombocytopenia (~100s), hyponatremia, and ascites. Other history includes osteopenia, LE cellulitis, crohn's disease on infliximab, and obesity. S/p DCD  donor liver transplant w/ biliary stent placement (on OCS x 19.4 hours) on 24 with Dr. Ball.     s/p DCD DDLT +stent 24: POD #4. LFTs continue to downtrend. Follow up US with patent vasculature.    - ASA 81 mg daily   - Ursodiol 300 BID   - Weaned off alprostadil gtt on . Normotensive since, will consider verapamil tablet if HTN    Immunosuppressed status secondary to medications:   Induction: Steroid taper per protocol.   Maintenance:    -  mg BID   - Tacrolimus goal level 8-12. Level high and dose reduced, recheck tomorrow.   - Prednisone 5 mg daily following taper.    Neuro:  Acute post op pain: prn oxycodone  Sleep: Trazodone qhs    Hematology:   Acute blood loss anemia: Hgb 8-9, rechecked and stable.   Chronic thrombocytopenia: r/t liver disease. PLT 86, monitor.     Cardiorespiratory:   Hypoxemia: 5L O2 charted overnight but suspect inaccurate sat reading. 93% on RA with good waveform this morning.    GI/Nutrition:   Moderate malnutrition in the context of chronic illness: Nutrition consulted. Regular diet, protein supplements     Endocrine:   Steroid induced hyperglycemia: Continue sliding scale insulin.    Fluid/Electrolytes:   Hyponatremia: Na 132, possibly due to hypervolemia.  Hypervolemia: Lasix x1 today.    : No acute issues.     Infectious disease: No issues.     Prophylaxis: DVT, fall, GI, fungal (Fluconzole, nystatin), viral (Valcyte), pneumocystis (sulfa allergy, G6PD normal, continue dapsone)    Disposition: 7A    DINESH/Fellow/Resident Provider: Miguelina Weinstein NP     Faculty:  SHANNAN Geronimo  __________________________________________________________________      Interval History: History is obtained from the patient, spouse  Overnight events: No acute events overnight. Feeling well today.    ROS:   A 10-point review of systems was negative except as noted above.    Curent Meds:  Current Facility-Administered Medications   Medication Dose Route Frequency Provider Last Rate Last Admin    aspirin (ASA) chewable tablet 81 mg  81 mg Oral or Feeding Tube Daily Isaac Vargas MD   81 mg at 06/26/24 0815    dapsone (ACZONE) tablet 50 mg  50 mg Oral or Feeding Tube Daily Pravin Ball MD   50 mg at 06/26/24 0816    insulin aspart (NovoLOG) injection (RAPID ACTING)  1-7 Units Subcutaneous TID AC Jeb Blackwell MD   1 Units at 06/26/24 0816    insulin aspart (NovoLOG) injection (RAPID ACTING)  1-5 Units Subcutaneous At Bedtime Jeb Blackwell MD   1 Units at 06/25/24 2157    mycophenolate (GENERIC EQUIVALENT) capsule 750 mg  750 mg Oral BID IS Isaac Vargas MD   750 mg at 06/26/24 0816    nystatin (MYCOSTATIN) suspension 500,000 Units  500,000 Units Swish & Swallow 4x Daily Pravin Ball MD   500,000 Units at 06/26/24 1105    pantoprazole (PROTONIX) EC tablet 40 mg  40 mg Oral Daily Pravin Ball MD   40 mg at 06/26/24 0816    polyethylene glycol (MIRALAX) Packet 17 g  17 g Oral or Feeding Tube BID Nader Dobbins MD   17 g at 06/26/24 0815    [START ON 6/27/2024] predniSONE (DELTASONE) tablet 10 mg  10 mg Oral or Feeding Tube Once Isaac Vargas MD        [START ON 6/28/2024] predniSONE (DELTASONE) tablet 5 mg  5 mg Oral or Feeding Tube Daily Tao Oneil MD        senna-docusate (SENOKOT-S/PERICOLACE) 8.6-50 MG per tablet 1-2 tablet  1-2 tablet Oral or Feeding Tube BID Philly Webb NP   1 tablet at 06/26/24 0816    sodium chloride (PF) 0.9% PF flush 3 mL  3 mL Intravenous Q8H Isaac Vargas MD   3 mL at 06/26/24 0601    tacrolimus  (GENERIC EQUIVALENT) capsule 1 mg  1 mg Oral BID IS Miguelina Weinstein GhazalDARYN CNP   1 mg at 06/26/24 0815    traZODone (DESYREL) half-tab 25 mg  25 mg Oral At Bedtime Philly Webb NP   25 mg at 06/25/24 2154    Or    traZODone (DESYREL) tablet 50 mg  50 mg Oral At Bedtime Philly Webb NP        ursodiol (ACTIGALL) capsule 300 mg  300 mg Oral or Feeding Tube BID Isaac Vargas MD   300 mg at 06/26/24 0816    valGANciclovir (VALCYTE) tablet 450 mg  450 mg Oral Daily Isaac Vargas MD   450 mg at 06/26/24 0815       Physical Exam:     Admit Weight: 77.3 kg (170 lb 8 oz)    Current Vitals:   BP (!) 149/90 (BP Location: Left arm)   Pulse 92   Temp 98.1  F (36.7  C) (Oral)   Resp 16   Wt 88.3 kg (194 lb 10.7 oz)   LMP 05/31/2006   SpO2 94%   BMI 32.39 kg/m      Vital sign ranges:    Temp:  [98.1  F (36.7  C)-98.6  F (37  C)] 98.1  F (36.7  C)  Pulse:  [] 92  Resp:  [16-18] 16  BP: (131-151)/(77-90) 149/90  SpO2:  [91 %-94 %] 94 %    General Appearance: in no apparent distress.   Skin: normal, warm, dry  Heart: Perfused  Lungs: unlabored on RA  Abdomen: soft. Incision covered; C/D/I. JACEK with serosanguinous output.   : no Conti  Extremities: edema: +1 BLE  Neurologic: A&Ox4. Tremor absent.     Frailty Scores          4/23/2024 2/4/2024 2/14/2023   Frailty Scores   Final Score Not Frail Not Frail Not Frail   Final Score Number 0 0 1      Details                   Data:   CMP  Recent Labs   Lab 06/26/24  1109 06/26/24  0754 06/26/24  0600 06/25/24  0820 06/25/24  0550 06/24/24  0629 06/24/24  0452 06/23/24  0538 06/23/24  0343 06/22/24  0821 06/20/24  1651   NA  --   --  132*  --  135  --  137   < > 139   < > 134*   POTASSIUM  --   --  4.2  --  4.5  --  3.8   < > 4.4   < > 4.0   CHLORIDE  --   --  100  --  102  --  105   < > 107   < > 102   CO2  --   --  24  --  25  --  23   < > 22   < > 23   * 140* 142*   < > 158*   < > 105*   < > 148*   < > 101*   BUN  --   --  39.4*  --  30.3*   --  17.9   < > 13.2   < > 11.0   CR  --   --  1.04*  --  0.91  --  0.62   < > 0.64   < > 0.77   GFRESTIMATED  --   --  62  --  72  --  >90   < > >90   < > 88   ARIEL  --   --  8.2*  --  8.3*  --  8.2*   < > 8.7   < > 8.9   ICAW  --   --   --   --   --   --  4.6  --  4.9   < >  --    MAG  --   --  2.3  --  2.2  --  2.0  --  1.8   < > 1.9   PHOS  --   --  3.0  --  4.0  --  3.2  --  2.8   < > 3.2   AMYLASE  --   --   --   --   --   --   --   --  114*  --  124*   LIPASE  --   --   --   --   --   --   --   --  115*  --   --    ALBUMIN  --   --  3.5  --  3.7  --  3.5   < > 3.8   < > 3.7   BILITOTAL  --   --  2.2*  --  2.7*  --  4.6*   < > 7.9*   < > 1.8*   ALKPHOS  --   --  138  --  124  --  112   < > 88   < > 112   AST  --   --  85*  --  206*  --  924*   < > 7,100*   < > 71*   ALT  --   --  401*  --  528*  --  777*   < > 1,199*   < > 37    < > = values in this interval not displayed.     CBC  Recent Labs   Lab 06/26/24  0600 06/25/24  1149 06/25/24  0550 06/22/24  1815 06/22/24  1613   HGB 8.4* 8.5* 7.9*   < >  --    WBC 10.5  --  8.4   < >  --    PLT 86*  --  77*   < >  --    A1C  --   --   --   --  5.1    < > = values in this interval not displayed.

## 2024-06-26 NOTE — PROGRESS NOTES
Care Management Discharge Note    Discharge Date: 2024       Discharge Disposition: Home, Home Care, Acute Rehab    Discharge Services: Home Care    Discharge DME:  (TBD)    Discharge Transportation: family or friend will provide    Private pay costs discussed: Not applicable    Does the patient's insurance plan have a 3 day qualifying hospital stay waiver?  No    PAS Confirmation Code:  Not needed  Patient/family educated on Medicare website which has current facility and service quality ratings: no    Education Provided on the Discharge Plan:  Yes   Persons Notified of Discharge Plans: RNCC following for discharge  Patient/Family in Agreement with the Plan: yes    Handoff Referral Completed:  RNCC following for discharge.     Additional Information:  Abiola Matute is a 58 year old female who underwent a  donor liver transplant on 2024. I received information that [facility] would like to speak with this writer regarding medication costs. I called [facility] who indicated that they were able to find a goodrx at a cheaper price at [facility]. They would like script sent to [facility] in [place] to utilized the Goodrx. Will communicate this with team.     Plan for 24 hour care for immediate post transplant period: Family/spouse    If not local, plans for short term stay:  They live local  Education and resources provided by  at discharge: role of transplant  in out patient setting and provided contact info for , availability of support groups    Discussed anticipated pharmacy out of pocket costs: YES    Provided [facility] Donor Letter Writing packet : NO - Will review later         JOHN Campbell

## 2024-06-26 NOTE — PROGRESS NOTES
Care Management Follow Up    Length of Stay (days): 4    Expected Discharge Date: 06/28/2024     Concerns to be Addressed: discharge planning     Patient plan of care discussed at interdisciplinary rounds: Yes    Anticipated Discharge Disposition: Home, Home Care, Acute Rehab     Anticipated Discharge Services: Home Care  Anticipated Discharge DME:  (TBD)    Patient/family educated on Medicare website which has current facility and service quality ratings: no  Education Provided on the Discharge Plan:    Patient/Family in Agreement with the Plan: yes    Referrals Placed by CM/SW: Internal Clinic Care Coordination, Homecare, Post Acute Facilities  Private pay costs discussed: Not applicable    Additional Information:    Patient s/p liver transplant. Anticipate discharge ARU vs home with home care. Team anticipates possible discharge tomorrow vs Friday.    [facility] accepted pt for home RN, PT/OT services.  Met with pt and spouse.   Updated on home care acceptance.  Updated on anticipated date of discharge.  Pt and spouse voiced understanding.  Anxious about possible discharge tomorrow just feeling it is too soon.  Pt and spouse noted concern with stairs and want to make sure pt is safe in managing stairs as pt will need to navigate stairs to access bedroom/bathroom.  Messaged PT.  Messaged [facility] Transplant SW.    Accepting Home Care  [facility]   [phone]   Fax [phone]    Susan Patel RN BSN, PHN, ACM-RN  June 26, 2024  Nurse Coordinator    Phone: [phone]    Social Work and Care Management Department     SEARCHABLE in [facility] - search CARE COORDINATOR     [place] ([phone]) Saturday & Sunday; ([phone]) [facility] Recognized Holidays     Units: 5A Onc Vocera & 5C Vocera     Units: 6B Vocera & 6C Vocera      Units: 7A SOT RNCC Vocera, 7B Med Surg Vocera, & 7C Med Surg Vocera      Units: 6A Vocera & 4A CVICU Vocera, 4C MICU Vocera, and 4E SICU Vocera      Units: 5 Ortho Vocera & 5 Med  Surg Vocera      Units: 6 Med Surg Vocera & 8 Med Surg Vocera      6/26/2024

## 2024-06-26 NOTE — PLAN OF CARE
Goal Outcome Evaluation:      Plan of Care Reviewed With: patient    Overall Patient Progress: improving  BP (!) 148/84 (BP Location: Left arm)   Pulse 92   Temp 98.2  F (36.8  C) (Oral)   Resp 18   Wt 88.3 kg (194 lb 10.7 oz)   LMP 05/31/2006   SpO2 94%   BMI 32.39 kg/m      Shift: 4759-8120  Isolation Status: standard  VS: stable on 2L NC, afebrile  Neuro: Aox4  Behaviors: Calm and cooperative  BG: ACHS: 204,147  Labs/Imaging: pending AM lab results  Respiratory: WDL  Cardiac: tachycardic 's  Pain/Nausea: mild pain, denies nausea  PRN: oxycodone x1  Diet: regular  IV Access: R internal jugular SL   Infusion(s): none  Lines/Drains: R JACEK  GI/: voids spontaneously without difficulty, 1 large BM  Skin: clamshell incision, stapled, KRISTIAN  Mobility: assist of 1 with walker  Plan: Continue with plan of care, will notify MD with any changes

## 2024-06-27 ENCOUNTER — APPOINTMENT (OUTPATIENT)
Dept: PHYSICAL THERAPY | Facility: CLINIC | Age: 59
DRG: 006 | End: 2024-06-27
Attending: TRANSPLANT SURGERY
Payer: MEDICARE

## 2024-06-27 LAB
ALBUMIN SERPL BCG-MCNC: 3.3 G/DL (ref 3.5–5.2)
ALP SERPL-CCNC: 138 U/L (ref 40–150)
ALT SERPL W P-5'-P-CCNC: 280 U/L (ref 0–50)
ANION GAP SERPL CALCULATED.3IONS-SCNC: 10 MMOL/L (ref 7–15)
AST SERPL W P-5'-P-CCNC: 55 U/L (ref 0–45)
BACTERIA FLD CULT: NO GROWTH
BILIRUB DIRECT SERPL-MCNC: 1.15 MG/DL (ref 0–0.3)
BILIRUB SERPL-MCNC: 2.1 MG/DL
BUN SERPL-MCNC: 30.7 MG/DL (ref 8–23)
CALCIUM SERPL-MCNC: 8 MG/DL (ref 8.6–10)
CHLORIDE SERPL-SCNC: 101 MMOL/L (ref 98–107)
CREAT SERPL-MCNC: 0.9 MG/DL (ref 0.51–0.95)
DEPRECATED HCO3 PLAS-SCNC: 23 MMOL/L (ref 22–29)
EGFRCR SERPLBLD CKD-EPI 2021: 73 ML/MIN/1.73M2
ERYTHROCYTE [DISTWIDTH] IN BLOOD BY AUTOMATED COUNT: 17 % (ref 10–15)
GLUCOSE BLDC GLUCOMTR-MCNC: 110 MG/DL (ref 70–99)
GLUCOSE BLDC GLUCOMTR-MCNC: 128 MG/DL (ref 70–99)
GLUCOSE BLDC GLUCOMTR-MCNC: 129 MG/DL (ref 70–99)
GLUCOSE BLDC GLUCOMTR-MCNC: 187 MG/DL (ref 70–99)
GLUCOSE SERPL-MCNC: 109 MG/DL (ref 70–99)
HCT VFR BLD AUTO: 25.7 % (ref 35–47)
HGB BLD-MCNC: 9 G/DL (ref 11.7–15.7)
MAGNESIUM SERPL-MCNC: 1.9 MG/DL (ref 1.7–2.3)
MCH RBC QN AUTO: 33.2 PG (ref 26.5–33)
MCHC RBC AUTO-ENTMCNC: 35 G/DL (ref 31.5–36.5)
MCV RBC AUTO: 95 FL (ref 78–100)
PATH REPORT.COMMENTS IMP SPEC: NORMAL
PATH REPORT.FINAL DX SPEC: NORMAL
PATH REPORT.GROSS SPEC: NORMAL
PATH REPORT.MICROSCOPIC SPEC OTHER STN: NORMAL
PATH REPORT.RELEVANT HX SPEC: NORMAL
PHOSPHATE SERPL-MCNC: 2.5 MG/DL (ref 2.5–4.5)
PHOTO IMAGE: NORMAL
PLATELET # BLD AUTO: 74 10E3/UL (ref 150–450)
POTASSIUM SERPL-SCNC: 4.2 MMOL/L (ref 3.4–5.3)
PROT SERPL-MCNC: 5.7 G/DL (ref 6.4–8.3)
RBC # BLD AUTO: 2.71 10E6/UL (ref 3.8–5.2)
SODIUM SERPL-SCNC: 134 MMOL/L (ref 135–145)
TACROLIMUS BLD-MCNC: 10.7 UG/L (ref 5–15)
TME LAST DOSE: NORMAL H
TME LAST DOSE: NORMAL H
WBC # BLD AUTO: 9.3 10E3/UL (ref 4–11)

## 2024-06-27 PROCEDURE — 250N000013 HC RX MED GY IP 250 OP 250 PS 637: Performed by: SURGERY

## 2024-06-27 PROCEDURE — 80053 COMPREHEN METABOLIC PANEL: CPT | Performed by: SURGERY

## 2024-06-27 PROCEDURE — 97116 GAIT TRAINING THERAPY: CPT | Mod: GP

## 2024-06-27 PROCEDURE — 80197 ASSAY OF TACROLIMUS: CPT | Performed by: NURSE PRACTITIONER

## 2024-06-27 PROCEDURE — 250N000011 HC RX IP 250 OP 636: Performed by: NURSE PRACTITIONER

## 2024-06-27 PROCEDURE — 83735 ASSAY OF MAGNESIUM: CPT | Performed by: SURGERY

## 2024-06-27 PROCEDURE — 250N000012 HC RX MED GY IP 250 OP 636 PS 637: Performed by: SURGERY

## 2024-06-27 PROCEDURE — 36415 COLL VENOUS BLD VENIPUNCTURE: CPT | Performed by: NURSE PRACTITIONER

## 2024-06-27 PROCEDURE — 97530 THERAPEUTIC ACTIVITIES: CPT | Mod: GP

## 2024-06-27 PROCEDURE — 250N000013 HC RX MED GY IP 250 OP 250 PS 637: Performed by: NURSE PRACTITIONER

## 2024-06-27 PROCEDURE — 250N000013 HC RX MED GY IP 250 OP 250 PS 637: Performed by: TRANSPLANT SURGERY

## 2024-06-27 PROCEDURE — 85027 COMPLETE CBC AUTOMATED: CPT | Performed by: NURSE PRACTITIONER

## 2024-06-27 PROCEDURE — 250N000012 HC RX MED GY IP 250 OP 636 PS 637: Performed by: NURSE PRACTITIONER

## 2024-06-27 PROCEDURE — 120N000011 HC R&B TRANSPLANT UMMC

## 2024-06-27 PROCEDURE — 84100 ASSAY OF PHOSPHORUS: CPT | Performed by: SURGERY

## 2024-06-27 RX ORDER — OXYCODONE HYDROCHLORIDE 5 MG/1
5 TABLET ORAL EVERY 6 HOURS PRN
Status: DISCONTINUED | OUTPATIENT
Start: 2024-06-27 | End: 2024-06-28

## 2024-06-27 RX ORDER — METHOCARBAMOL 500 MG/1
500 TABLET, FILM COATED ORAL 4 TIMES DAILY PRN
Status: DISCONTINUED | OUTPATIENT
Start: 2024-06-27 | End: 2024-06-28 | Stop reason: HOSPADM

## 2024-06-27 RX ORDER — FUROSEMIDE 10 MG/ML
20 INJECTION INTRAMUSCULAR; INTRAVENOUS ONCE
Status: COMPLETED | OUTPATIENT
Start: 2024-06-27 | End: 2024-06-27

## 2024-06-27 RX ADMIN — FUROSEMIDE 20 MG: 10 INJECTION, SOLUTION INTRAMUSCULAR; INTRAVENOUS at 14:40

## 2024-06-27 RX ADMIN — TACROLIMUS 1 MG: 1 CAPSULE ORAL at 08:40

## 2024-06-27 RX ADMIN — NYSTATIN 500000 UNITS: 100000 SUSPENSION ORAL at 14:40

## 2024-06-27 RX ADMIN — PANTOPRAZOLE SODIUM 40 MG: 40 TABLET, DELAYED RELEASE ORAL at 08:40

## 2024-06-27 RX ADMIN — DAPSONE 50 MG: 25 TABLET ORAL at 08:40

## 2024-06-27 RX ADMIN — OXYCODONE HYDROCHLORIDE 5 MG: 5 TABLET ORAL at 04:27

## 2024-06-27 RX ADMIN — NYSTATIN 500000 UNITS: 100000 SUSPENSION ORAL at 08:40

## 2024-06-27 RX ADMIN — MYCOPHENOLATE MOFETIL 750 MG: 250 CAPSULE ORAL at 17:18

## 2024-06-27 RX ADMIN — METHOCARBAMOL 500 MG: 500 TABLET ORAL at 14:55

## 2024-06-27 RX ADMIN — URSODIOL 300 MG: 300 CAPSULE ORAL at 20:56

## 2024-06-27 RX ADMIN — VALGANCICLOVIR HYDROCHLORIDE 450 MG: 450 TABLET ORAL at 08:40

## 2024-06-27 RX ADMIN — METHOCARBAMOL 500 MG: 500 TABLET ORAL at 20:56

## 2024-06-27 RX ADMIN — ASPIRIN 81 MG CHEWABLE TABLET 81 MG: 81 TABLET CHEWABLE at 08:40

## 2024-06-27 RX ADMIN — NYSTATIN 500000 UNITS: 100000 SUSPENSION ORAL at 20:56

## 2024-06-27 RX ADMIN — PREDNISONE 10 MG: 10 TABLET ORAL at 08:40

## 2024-06-27 RX ADMIN — MYCOPHENOLATE MOFETIL 750 MG: 250 CAPSULE ORAL at 08:40

## 2024-06-27 RX ADMIN — OXYCODONE HYDROCHLORIDE 5 MG: 5 TABLET ORAL at 08:40

## 2024-06-27 RX ADMIN — TACROLIMUS 1 MG: 1 CAPSULE ORAL at 17:18

## 2024-06-27 RX ADMIN — DOCUSATE SODIUM 50 MG AND SENNOSIDES 8.6 MG 1 TABLET: 8.6; 5 TABLET, FILM COATED ORAL at 20:56

## 2024-06-27 RX ADMIN — TRAZODONE HYDROCHLORIDE 50 MG: 50 TABLET ORAL at 21:00

## 2024-06-27 RX ADMIN — URSODIOL 300 MG: 300 CAPSULE ORAL at 08:40

## 2024-06-27 ASSESSMENT — ACTIVITIES OF DAILY LIVING (ADL)
ADLS_ACUITY_SCORE: 32
ADLS_ACUITY_SCORE: 28
ADLS_ACUITY_SCORE: 28
ADLS_ACUITY_SCORE: 32
ADLS_ACUITY_SCORE: 28
ADLS_ACUITY_SCORE: 28
ADLS_ACUITY_SCORE: 32
ADLS_ACUITY_SCORE: 28
ADLS_ACUITY_SCORE: 28
ADLS_ACUITY_SCORE: 32
ADLS_ACUITY_SCORE: 28
ADLS_ACUITY_SCORE: 32
ADLS_ACUITY_SCORE: 32
ADLS_ACUITY_SCORE: 28
ADLS_ACUITY_SCORE: 32
ADLS_ACUITY_SCORE: 28
ADLS_ACUITY_SCORE: 28
ADLS_ACUITY_SCORE: 32

## 2024-06-27 NOTE — PLAN OF CARE
Problem: Adult Inpatient Plan of Care  Goal: Optimal Comfort and Wellbeing  Outcome: Met  Intervention: Provide Person-Centered Care  Recent Flowsheet Documentation  Taken 6/27/2024 1500 by Hazel Plata RN  Trust Relationship/Rapport:   care explained   choices provided   questions answered  Taken 6/27/2024 1000 by Hazel Plata RN  Trust Relationship/Rapport:   care explained   choices provided   questions answered     Problem: Adult Inpatient Plan of Care  Goal: Readiness for Transition of Care  Outcome: Met   Goal Outcome Evaluation:

## 2024-06-27 NOTE — PROGRESS NOTES
Transplant Surgery  Inpatient Daily Progress Note  2024    Assessment & Plan: Abiola Matute is a 58 year old female with a history of  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 Saavedra, thrombocytopenia (~100s), hyponatremia, and ascites. Other history includes osteopenia, LE cellulitis, crohn's disease on infliximab, and obesity. S/p DCD  donor liver transplant w/ biliary stent placement (on OCS x 19.4 hours) on 24 with Dr. Ball.     s/p DCD DDLT +stent 24: POD#5. LFTs continue to downtrend. Follow up US with patent vasculature.    - ASA 81 mg daily   - Ursodiol 300 BID   - Weaned off alprostadil gtt on . Normotensive since, will consider verapamil tablet if HTN.    Immunosuppressed status secondary to medications:   Induction: Steroid taper per protocol.   Maintenance:    -  mg BID   - Tacrolimus 1 mg BID. Goal level 8-12.    - Prednisone 5 mg daily following taper.    Neuro:  Acute post op pain: Continue PRN oxycodone (weaning), add PRN Robaxin.   Sleep: Trazodone qhs    Hematology:   Acute blood loss anemia: Hgb 8-9, stable.  Chronic thrombocytopenia: r/t liver disease. PLT 70's, stable.     Cardiorespiratory: No issues.    GI/Nutrition:   Moderate malnutrition in the context of chronic illness: Nutrition consulted. Regular diet, protein supplements     Endocrine:   Steroid induced hyperglycemia: Stop sliding scale insulin. Resolved.     Fluid/Electrolytes:   Hyponatremia: Na 134, possibly due to hypervolemia. Improving.   - Lasix x1 today.   RADHA: Cr peaked at 1, now trending down. Baseline ~0.6.    Infectious disease: No issues.     Prophylaxis: DVT (mechanical), fall, GI, fungal (Nystatin), viral (Valcyte), pneumocystis (sulfa allergy, G6PD normal, continue dapsone)    Disposition: 7A, plan for discharge home tomorrow.     DINESH/Fellow/Resident Provider: Philly Webb NP [id number]    Faculty: Juanpablo  M.D.  __________________________________________________________________      Interval History: History is obtained from the patient, spouse  Overnight events: No acute events overnight. Feeling well today.    ROS:   A 10-point review of systems was negative except as noted above.    Curent Meds:  Current Facility-Administered Medications   Medication Dose Route Frequency Provider Last Rate Last Admin    aspirin (ASA) chewable tablet 81 mg  81 mg Oral or Feeding Tube Daily Isaac Vargas MD   81 mg at 06/27/24 0840    dapsone (ACZONE) tablet 50 mg  50 mg Oral or Feeding Tube Daily Pravin Ball MD   50 mg at 06/27/24 0840    insulin aspart (NovoLOG) injection (RAPID ACTING)  1-7 Units Subcutaneous TID AC Jeb Blackwell MD   1 Units at 06/26/24 1733    insulin aspart (NovoLOG) injection (RAPID ACTING)  1-5 Units Subcutaneous At Bedtime Jeb Blackwell MD   1 Units at 06/25/24 2157    mycophenolate (GENERIC EQUIVALENT) capsule 750 mg  750 mg Oral BID IS Isaac Vargas MD   750 mg at 06/27/24 0840    nystatin (MYCOSTATIN) suspension 500,000 Units  500,000 Units Swish & Swallow 4x Daily Pravin Ball MD   500,000 Units at 06/27/24 0840    pantoprazole (PROTONIX) EC tablet 40 mg  40 mg Oral Daily Pravin Ball MD   40 mg at 06/27/24 0840    polyethylene glycol (MIRALAX) Packet 17 g  17 g Oral or Feeding Tube BID Nader Dobbins MD   17 g at 06/26/24 0815    [START ON 6/28/2024] predniSONE (DELTASONE) tablet 5 mg  5 mg Oral or Feeding Tube Daily aTo Oneil MD        senna-docusate (SENOKOT-S/PERICOLACE) 8.6-50 MG per tablet 1-2 tablet  1-2 tablet Oral or Feeding Tube BID Philly Webb, NP   1 tablet at 06/26/24 2042    sodium chloride (PF) 0.9% PF flush 3 mL  3 mL Intravenous Q8H Isaac Vargas MD   3 mL at 06/27/24 0605    tacrolimus (GENERIC EQUIVALENT) capsule 1 mg  1 mg Oral BID IS Miguelina Weinstein APRN CNP   1 mg at 06/27/24 0840    traZODone (DESYREL)  half-tab 25 mg  25 mg Oral At Bedtime Philly Webb NP   25 mg at 06/25/24 2154    Or    traZODone (DESYREL) tablet 50 mg  50 mg Oral At Bedtime Philly Webb NP   50 mg at 06/26/24 2222    ursodiol (ACTIGALL) capsule 300 mg  300 mg Oral or Feeding Tube BID Isaac Vargas MD   300 mg at 06/27/24 0840    valGANciclovir (VALCYTE) tablet 450 mg  450 mg Oral Daily Isaac Vargas MD   450 mg at 06/27/24 0840       Physical Exam:     Admit Weight: 77.3 kg (170 lb 8 oz)    Current Vitals:   BP (!) 151/88 (BP Location: Right arm)   Pulse 86   Temp 98  F (36.7  C) (Oral)   Resp 18   Wt 88.3 kg (194 lb 10.7 oz)   LMP 05/31/2006   SpO2 93%   BMI 32.39 kg/m      Vital sign ranges:    Temp:  [98  F (36.7  C)-98.8  F (37.1  C)] 98  F (36.7  C)  Pulse:  [86-96] 86  Resp:  [16-18] 18  BP: (139-159)/(85-92) 151/88  SpO2:  [92 %-94 %] 93 %    General Appearance: in no apparent distress.   Skin: normal, warm, dry  Heart: Perfused  Lungs: unlabored on RA  Abdomen: soft. Incision covered; C/D/I. JACEK with serosanguinous output.   : no Conti  Extremities: edema: +1 BLE  Neurologic: A&Ox4. Tremor absent.     Frailty Scores          4/23/2024 2/4/2024 2/14/2023   Frailty Scores   Final Score Not Frail Not Frail Not Frail   Final Score Number 0 0 1      Details                   Data:   CMP  Recent Labs   Lab 06/27/24  1229 06/27/24  0823 06/27/24  0650 06/26/24  0754 06/26/24  0600 06/24/24  0629 06/24/24  0452 06/23/24  0538 06/23/24  0343 06/22/24  0821 06/20/24  1651   NA  --   --  134*  --  132*   < > 137   < > 139   < > 134*   POTASSIUM  --   --  4.2  --  4.2   < > 3.8   < > 4.4   < > 4.0   CHLORIDE  --   --  101  --  100   < > 105   < > 107   < > 102   CO2  --   --  23  --  24   < > 23   < > 22   < > 23   * 110* 109*   < > 142*   < > 105*   < > 148*   < > 101*   BUN  --   --  30.7*  --  39.4*   < > 17.9   < > 13.2   < > 11.0   CR  --   --  0.90  --  1.04*   < > 0.62   < > 0.64   < > 0.77    GFRESTIMATED  --   --  73  --  62   < > >90   < > >90   < > 88   ARIEL  --   --  8.0*  --  8.2*   < > 8.2*   < > 8.7   < > 8.9   ICAW  --   --   --   --   --   --  4.6  --  4.9   < >  --    MAG  --   --  1.9  --  2.3   < > 2.0  --  1.8   < > 1.9   PHOS  --   --  2.5  --  3.0   < > 3.2  --  2.8   < > 3.2   AMYLASE  --   --   --   --   --   --   --   --  114*  --  124*   LIPASE  --   --   --   --   --   --   --   --  115*  --   --    ALBUMIN  --   --  3.3*  --  3.5   < > 3.5   < > 3.8   < > 3.7   BILITOTAL  --   --  2.1*  --  2.2*   < > 4.6*   < > 7.9*   < > 1.8*   ALKPHOS  --   --  138  --  138   < > 112   < > 88   < > 112   AST  --   --  55*  --  85*   < > 924*   < > 7,100*   < > 71*   ALT  --   --  280*  --  401*   < > 777*   < > 1,199*   < > 37    < > = values in this interval not displayed.     CBC  Recent Labs   Lab 06/27/24  0650 06/26/24  0600 06/22/24  1815 06/22/24  1613   HGB 9.0* 8.4*   < >  --    WBC 9.3 10.5   < >  --    PLT 74* 86*   < >  --    A1C  --   --   --  5.1    < > = values in this interval not displayed.

## 2024-06-27 NOTE — PLAN OF CARE
Goal Outcome Evaluation:       BP (!) 151/88 (BP Location: Right arm)   Pulse 86   Temp 98  F (36.7  C) (Oral)   Resp 18   Wt 88.3 kg (194 lb 10.7 oz)   LMP 2006   SpO2 93%   BMI 32.39 kg/m      Shift: 8499-2731  Isolation Status: NA  VSS, BP elevated on RA, afebrile  Neuro: Aox4  Behaviors: pleasant & cooperative  B  Labs: LFTs trending down  Respiratory: WNL, sats 90s on RA  Cardiac: WNL  Pain/Nausea: managed well with meds  PRN: Oxycodone  Diet: Regular  IV Access: PIV  Infusion(s): NA  Lines/Drains: JACEK pulled today needs stitch removed.  GI/: LBM , voiding adequately  Skin: No new concerns  Mobility: UAL  Events/Education: DM education, Rx education  Plan: POC, update team prn, possible discharge tomorrow.

## 2024-06-27 NOTE — PLAN OF CARE
Goal Outcome Evaluation:      Plan of Care Reviewed With: patient    Overall Patient Progress: improvingOverall Patient Progress: improving    BP (!) 145/92 (BP Location: Left arm)   Pulse 92   Temp 98.8  F (37.1  C) (Oral)   Resp 16   Wt 88.3 kg (194 lb 10.7 oz)   LMP 05/31/2006   SpO2 92%   BMI 32.39 kg/m      Shift: 5975-7913  Isolation Status: N/A  VS: stable on RA, afebrile  Neuro: A&O x4  Behaviors: receptive to cares  BG: ACHS w/ 0200 checks: 180, 128  Labs/Imaging: Na 132, Tbili 2.2, Dbili 1.37, , AST 85; pending AM labs  Respiratory: WDL, diminished LS  Cardiac: WDL  Pain/Nausea: Denied nausea overnight. C/O moderate pain, given PRN Oxycodone for pain  PRN: Oxycodone 5 mg PO x1 for pain  Diet: regular  IV Access: L PIV  Infusion(s): N/A  Lines/Drains: R JACEK drain with ~ 400 mL serosang output with some clots overnight  GI/: BM x1 on shift. Voiding adequately, hat in toilet to measure, ~ 1.7 L yellow urine overnight  Skin: clamshell incision stapled and KRISTIAN, generalized bruising  Mobility: SBA with walker  Events/Education: Med card and lab book up to date as of 6/26, satted well overnight on RA  Plan: Discharge home with cares tomorrow or Friday depending team assessment. Notify MD of any significant changes, continue plan of care.     Hand off to be given to oncoming RN.

## 2024-06-27 NOTE — PROGRESS NOTES
Care Management Follow Up    Length of Stay (days): 5    Expected Discharge Date: 06/28/2024     Concerns to be Addressed: discharge planning     Patient plan of care discussed at interdisciplinary rounds: Yes    Anticipated Discharge Disposition: Home, Home Care, Acute Rehab     Anticipated Discharge Services: Home Care  Anticipated Discharge DME:  (TBD)    Patient/family educated on Medicare website which has current facility and service quality ratings: no  Education Provided on the Discharge Plan:    Patient/Family in Agreement with the Plan: yes    Referrals Placed by CM/SW: Internal Clinic Care Coordination, Homecare, Post Acute Facilities  Private pay costs discussed: Not applicable    Additional Information:  Pt s/p liver transplant.  Anticipate discharge on Friday 6/28 home with home care.  [facility] accepted patient for home RN, PT, OT services.    Met with pt and spouse.   Pt confirmed she worked on stairs with PT.  Pt and spouse note no concerns regarding plan for discharge.     Updated Ghazal, [facility].    Accepting Home Care  [facility]   [phone]   Fax [phone]    Susan Patel RN BSN, PHN, ACM-RN  June 27, 2024  Nurse Coordinator    Phone: [phone]    Social Work and Care Management Department     SEARCHABLE in [facility] - search CARE COORDINATOR     [place] ([id number]) Saturday & Sunday; ([phone]) [facility] Recognized Holidays     Units: 5A Onc Vocera & 5C Vocera     Units: 6B Vocera & 6C Vocera      Units: 7A SOT RNCC Vocera, 7B Med Surg Vocera, & 7C Med Surg Vocera      Units: 6A Vocera & 4A CVICU Vocera, 4C MICU Vocera, and 4E SICU Vocera      Units: 5 Ortho Vocera & 5 Med Surg Vocera      Units: 6 Med Surg Vocera & 8 Med Surg Vocera      6/27/2024

## 2024-06-28 ENCOUNTER — TELEPHONE (OUTPATIENT)
Dept: PHARMACY | Facility: CLINIC | Age: 59
End: 2024-06-28
Payer: MEDICARE

## 2024-06-28 ENCOUNTER — TELEPHONE (OUTPATIENT)
Dept: TRANSPLANT | Facility: CLINIC | Age: 59
End: 2024-06-28
Payer: MEDICARE

## 2024-06-28 ENCOUNTER — APPOINTMENT (OUTPATIENT)
Dept: PHYSICAL THERAPY | Facility: CLINIC | Age: 59
DRG: 006 | End: 2024-06-28
Attending: TRANSPLANT SURGERY
Payer: MEDICARE

## 2024-06-28 VITALS
TEMPERATURE: 98 F | SYSTOLIC BLOOD PRESSURE: 135 MMHG | OXYGEN SATURATION: 92 % | WEIGHT: 179.5 LBS | BODY MASS INDEX: 29.87 KG/M2 | HEART RATE: 97 BPM | RESPIRATION RATE: 18 BRPM | DIASTOLIC BLOOD PRESSURE: 86 MMHG

## 2024-06-28 DIAGNOSIS — Z94.4 S/P LIVER TRANSPLANT (H): ICD-10-CM

## 2024-06-28 PROBLEM — G89.18 ACUTE POST-OPERATIVE PAIN: Status: ACTIVE | Noted: 2024-06-28

## 2024-06-28 PROBLEM — E83.42 HYPOMAGNESEMIA: Status: ACTIVE | Noted: 2024-06-28

## 2024-06-28 PROBLEM — N17.9 AKI (ACUTE KIDNEY INJURY) (H): Status: ACTIVE | Noted: 2024-06-28

## 2024-06-28 PROBLEM — D62 ANEMIA DUE TO BLOOD LOSS, ACUTE: Status: ACTIVE | Noted: 2024-06-28

## 2024-06-28 PROBLEM — D84.9 IMMUNOSUPPRESSED STATUS (H): Status: ACTIVE | Noted: 2024-06-28

## 2024-06-28 PROBLEM — E44.0 MODERATE MALNUTRITION (H): Status: ACTIVE | Noted: 2024-06-28

## 2024-06-28 PROBLEM — E87.1 HYPONATREMIA: Status: ACTIVE | Noted: 2023-08-23

## 2024-06-28 PROBLEM — G47.00 INSOMNIA: Status: ACTIVE | Noted: 2024-06-28

## 2024-06-28 LAB
ALBUMIN SERPL BCG-MCNC: 3.3 G/DL (ref 3.5–5.2)
ALP SERPL-CCNC: 142 U/L (ref 40–150)
ALT SERPL W P-5'-P-CCNC: 205 U/L (ref 0–50)
ANION GAP SERPL CALCULATED.3IONS-SCNC: 7 MMOL/L (ref 7–15)
AST SERPL W P-5'-P-CCNC: 43 U/L (ref 0–45)
BILIRUB DIRECT SERPL-MCNC: 0.94 MG/DL (ref 0–0.3)
BILIRUB SERPL-MCNC: 1.8 MG/DL
BUN SERPL-MCNC: 21.2 MG/DL (ref 8–23)
CALCIUM SERPL-MCNC: 7.9 MG/DL (ref 8.6–10)
CHLORIDE SERPL-SCNC: 100 MMOL/L (ref 98–107)
CREAT SERPL-MCNC: 0.88 MG/DL (ref 0.51–0.95)
DEPRECATED HCO3 PLAS-SCNC: 25 MMOL/L (ref 22–29)
EGFRCR SERPLBLD CKD-EPI 2021: 75 ML/MIN/1.73M2
ERYTHROCYTE [DISTWIDTH] IN BLOOD BY AUTOMATED COUNT: 17 % (ref 10–15)
GLUCOSE SERPL-MCNC: 108 MG/DL (ref 70–99)
HCT VFR BLD AUTO: 26.8 % (ref 35–47)
HGB BLD-MCNC: 9.3 G/DL (ref 11.7–15.7)
MAGNESIUM SERPL-MCNC: 1.7 MG/DL (ref 1.7–2.3)
MCH RBC QN AUTO: 33 PG (ref 26.5–33)
MCHC RBC AUTO-ENTMCNC: 34.7 G/DL (ref 31.5–36.5)
MCV RBC AUTO: 95 FL (ref 78–100)
PHOSPHATE SERPL-MCNC: 2.9 MG/DL (ref 2.5–4.5)
PLATELET # BLD AUTO: 91 10E3/UL (ref 150–450)
POTASSIUM SERPL-SCNC: 4.4 MMOL/L (ref 3.4–5.3)
PROT SERPL-MCNC: 5.4 G/DL (ref 6.4–8.3)
RBC # BLD AUTO: 2.82 10E6/UL (ref 3.8–5.2)
SODIUM SERPL-SCNC: 132 MMOL/L (ref 135–145)
TACROLIMUS BLD-MCNC: 9 UG/L (ref 5–15)
TME LAST DOSE: NORMAL H
TME LAST DOSE: NORMAL H
WBC # BLD AUTO: 11.4 10E3/UL (ref 4–11)

## 2024-06-28 PROCEDURE — 80053 COMPREHEN METABOLIC PANEL: CPT | Performed by: SURGERY

## 2024-06-28 PROCEDURE — 97116 GAIT TRAINING THERAPY: CPT | Mod: GP

## 2024-06-28 PROCEDURE — 83735 ASSAY OF MAGNESIUM: CPT | Performed by: SURGERY

## 2024-06-28 PROCEDURE — 36415 COLL VENOUS BLD VENIPUNCTURE: CPT | Performed by: NURSE PRACTITIONER

## 2024-06-28 PROCEDURE — 82248 BILIRUBIN DIRECT: CPT | Performed by: SURGERY

## 2024-06-28 PROCEDURE — 84100 ASSAY OF PHOSPHORUS: CPT | Performed by: SURGERY

## 2024-06-28 PROCEDURE — 97530 THERAPEUTIC ACTIVITIES: CPT | Mod: GP

## 2024-06-28 PROCEDURE — 250N000012 HC RX MED GY IP 250 OP 636 PS 637: Performed by: NURSE PRACTITIONER

## 2024-06-28 PROCEDURE — 250N000011 HC RX IP 250 OP 636: Performed by: NURSE PRACTITIONER

## 2024-06-28 PROCEDURE — 250N000013 HC RX MED GY IP 250 OP 250 PS 637: Performed by: TRANSPLANT SURGERY

## 2024-06-28 PROCEDURE — 250N000012 HC RX MED GY IP 250 OP 636 PS 637: Performed by: SURGERY

## 2024-06-28 PROCEDURE — 99238 HOSP IP/OBS DSCHRG MGMT 30/<: CPT | Mod: 24 | Performed by: TRANSPLANT SURGERY

## 2024-06-28 PROCEDURE — 250N000013 HC RX MED GY IP 250 OP 250 PS 637: Performed by: SURGERY

## 2024-06-28 PROCEDURE — 85027 COMPLETE CBC AUTOMATED: CPT | Performed by: NURSE PRACTITIONER

## 2024-06-28 PROCEDURE — 80197 ASSAY OF TACROLIMUS: CPT | Performed by: NURSE PRACTITIONER

## 2024-06-28 PROCEDURE — 250N000012 HC RX MED GY IP 250 OP 636 PS 637

## 2024-06-28 PROCEDURE — 250N000013 HC RX MED GY IP 250 OP 250 PS 637

## 2024-06-28 PROCEDURE — 250N000013 HC RX MED GY IP 250 OP 250 PS 637: Performed by: NURSE PRACTITIONER

## 2024-06-28 RX ORDER — VALGANCICLOVIR 450 MG/1
450 TABLET, FILM COATED ORAL DAILY
Qty: 180 TABLET | Refills: 0 | Status: SHIPPED | OUTPATIENT
Start: 2024-06-29 | End: 2024-06-28

## 2024-06-28 RX ORDER — DAPSONE 25 MG/1
50 TABLET ORAL DAILY
Qty: 60 TABLET | Refills: 5 | Status: ON HOLD | OUTPATIENT
Start: 2024-06-29 | End: 2024-07-06

## 2024-06-28 RX ORDER — ACETAMINOPHEN 325 MG/1
650 TABLET ORAL EVERY 6 HOURS PRN
Qty: 60 TABLET | Refills: 0 | Status: SHIPPED | OUTPATIENT
Start: 2024-06-28

## 2024-06-28 RX ORDER — URSODIOL 300 MG/1
300 CAPSULE ORAL 2 TIMES DAILY
Qty: 60 CAPSULE | Refills: 2 | Status: SHIPPED | OUTPATIENT
Start: 2024-06-28 | End: 2024-07-09

## 2024-06-28 RX ORDER — OXYCODONE HYDROCHLORIDE 5 MG/1
5 TABLET ORAL EVERY 8 HOURS PRN
Status: DISCONTINUED | OUTPATIENT
Start: 2024-06-28 | End: 2024-06-28 | Stop reason: HOSPADM

## 2024-06-28 RX ORDER — AMOXICILLIN 250 MG
1-2 CAPSULE ORAL 2 TIMES DAILY PRN
Qty: 60 TABLET | Refills: 0 | Status: ON HOLD | OUTPATIENT
Start: 2024-06-28 | End: 2024-07-06

## 2024-06-28 RX ORDER — INFLIXIMAB 100 MG/10ML
INJECTION, POWDER, LYOPHILIZED, FOR SOLUTION INTRAVENOUS
Status: SHIPPED
Start: 2024-06-28 | End: 2024-06-30

## 2024-06-28 RX ORDER — ASPIRIN 81 MG/1
81 TABLET, CHEWABLE ORAL DAILY
Qty: 30 TABLET | Refills: 5 | Status: SHIPPED | OUTPATIENT
Start: 2024-06-29 | End: 2024-07-17

## 2024-06-28 RX ORDER — VALGANCICLOVIR 450 MG/1
450 TABLET, FILM COATED ORAL DAILY
Qty: 180 TABLET | Refills: 0 | Status: ACTIVE | OUTPATIENT
Start: 2024-06-29 | End: 2024-06-28

## 2024-06-28 RX ORDER — METHOCARBAMOL 500 MG/1
500 TABLET, FILM COATED ORAL 4 TIMES DAILY PRN
Qty: 20 TABLET | Refills: 0 | Status: ON HOLD | OUTPATIENT
Start: 2024-06-28 | End: 2024-07-06

## 2024-06-28 RX ORDER — MYCOPHENOLATE MOFETIL 250 MG/1
750 CAPSULE ORAL 2 TIMES DAILY
Qty: 180 CAPSULE | Refills: 11 | Status: ACTIVE | OUTPATIENT
Start: 2024-06-28 | End: 2024-07-17

## 2024-06-28 RX ORDER — FUROSEMIDE 10 MG/ML
20 INJECTION INTRAMUSCULAR; INTRAVENOUS ONCE
Status: COMPLETED | OUTPATIENT
Start: 2024-06-28 | End: 2024-06-28

## 2024-06-28 RX ORDER — TACROLIMUS 0.5 MG/1
0.5 CAPSULE ORAL 2 TIMES DAILY
Qty: 60 CAPSULE | Refills: 11 | Status: ON HOLD | OUTPATIENT
Start: 2024-06-28 | End: 2024-07-06

## 2024-06-28 RX ORDER — PANTOPRAZOLE SODIUM 40 MG/1
40 TABLET, DELAYED RELEASE ORAL DAILY
Qty: 30 TABLET | Refills: 2 | Status: SHIPPED | OUTPATIENT
Start: 2024-06-29 | End: 2024-07-17

## 2024-06-28 RX ORDER — VALGANCICLOVIR 450 MG/1
450 TABLET, FILM COATED ORAL DAILY
Qty: 180 TABLET | Refills: 0 | Status: SHIPPED | OUTPATIENT
Start: 2024-06-28 | End: 2024-07-19

## 2024-06-28 RX ORDER — TRAZODONE HYDROCHLORIDE 50 MG/1
25-50 TABLET, FILM COATED ORAL
Qty: 30 TABLET | Refills: 0 | Status: SHIPPED | OUTPATIENT
Start: 2024-06-28 | End: 2024-07-30

## 2024-06-28 RX ORDER — POLYETHYLENE GLYCOL 3350 17 G/17G
17 POWDER, FOR SOLUTION ORAL 2 TIMES DAILY PRN
Qty: 510 G | Refills: 0 | Status: SHIPPED | OUTPATIENT
Start: 2024-06-28 | End: 2024-09-24

## 2024-06-28 RX ORDER — HYDROXYZINE HYDROCHLORIDE 25 MG/1
25 TABLET, FILM COATED ORAL ONCE
Status: COMPLETED | OUTPATIENT
Start: 2024-06-28 | End: 2024-06-28

## 2024-06-28 RX ORDER — OXYCODONE HYDROCHLORIDE 5 MG/1
5 TABLET ORAL EVERY 8 HOURS PRN
Qty: 5 TABLET | Refills: 0 | Status: ON HOLD | OUTPATIENT
Start: 2024-06-28 | End: 2024-07-06

## 2024-06-28 RX ORDER — PREDNISONE 5 MG/1
5 TABLET ORAL DAILY
Qty: 30 TABLET | Refills: 2 | Status: SHIPPED | OUTPATIENT
Start: 2024-06-29 | End: 2024-07-17

## 2024-06-28 RX ORDER — ACETAMINOPHEN 325 MG/1
650 TABLET ORAL EVERY 6 HOURS PRN
Status: DISCONTINUED | OUTPATIENT
Start: 2024-06-28 | End: 2024-06-28 | Stop reason: HOSPADM

## 2024-06-28 RX ORDER — MAGNESIUM OXIDE 400 MG/1
400 TABLET ORAL DAILY
Qty: 30 TABLET | Refills: 2 | Status: ON HOLD | OUTPATIENT
Start: 2024-06-28 | End: 2024-07-06

## 2024-06-28 RX ORDER — TACROLIMUS 1 MG/1
1 CAPSULE ORAL 2 TIMES DAILY
Qty: 60 CAPSULE | Refills: 11 | Status: ON HOLD | OUTPATIENT
Start: 2024-06-28 | End: 2024-07-06

## 2024-06-28 RX ADMIN — DAPSONE 50 MG: 25 TABLET ORAL at 08:25

## 2024-06-28 RX ADMIN — PANTOPRAZOLE SODIUM 40 MG: 40 TABLET, DELAYED RELEASE ORAL at 08:25

## 2024-06-28 RX ADMIN — NYSTATIN 500000 UNITS: 100000 SUSPENSION ORAL at 11:47

## 2024-06-28 RX ADMIN — OXYCODONE HYDROCHLORIDE 5 MG: 5 TABLET ORAL at 08:33

## 2024-06-28 RX ADMIN — FUROSEMIDE 20 MG: 10 INJECTION, SOLUTION INTRAMUSCULAR; INTRAVENOUS at 11:47

## 2024-06-28 RX ADMIN — PREDNISONE 5 MG: 5 TABLET ORAL at 08:25

## 2024-06-28 RX ADMIN — URSODIOL 300 MG: 300 CAPSULE ORAL at 08:25

## 2024-06-28 RX ADMIN — NYSTATIN 500000 UNITS: 100000 SUSPENSION ORAL at 08:26

## 2024-06-28 RX ADMIN — HYDROXYZINE HYDROCHLORIDE 25 MG: 25 TABLET, FILM COATED ORAL at 04:03

## 2024-06-28 RX ADMIN — DOCUSATE SODIUM 50 MG AND SENNOSIDES 8.6 MG 2 TABLET: 8.6; 5 TABLET, FILM COATED ORAL at 08:25

## 2024-06-28 RX ADMIN — TACROLIMUS 1 MG: 1 CAPSULE ORAL at 08:25

## 2024-06-28 RX ADMIN — METHOCARBAMOL 500 MG: 500 TABLET ORAL at 02:42

## 2024-06-28 RX ADMIN — ASPIRIN 81 MG CHEWABLE TABLET 81 MG: 81 TABLET CHEWABLE at 08:25

## 2024-06-28 RX ADMIN — MYCOPHENOLATE MOFETIL 750 MG: 250 CAPSULE ORAL at 08:25

## 2024-06-28 RX ADMIN — VALGANCICLOVIR HYDROCHLORIDE 450 MG: 450 TABLET ORAL at 08:25

## 2024-06-28 ASSESSMENT — ACTIVITIES OF DAILY LIVING (ADL)
ADLS_ACUITY_SCORE: 28
ADLS_ACUITY_SCORE: 29
ADLS_ACUITY_SCORE: 29
ADLS_ACUITY_SCORE: 28
ADLS_ACUITY_SCORE: 29
ADLS_ACUITY_SCORE: 28
ADLS_ACUITY_SCORE: 29
ADLS_ACUITY_SCORE: 28
ADLS_ACUITY_SCORE: 29

## 2024-06-28 NOTE — PLAN OF CARE
/86 (BP Location: Left arm)   Pulse 97   Temp 98  F (36.7  C) (Oral)   Resp 18   Wt 81.4 kg (179 lb 8 oz)   LMP 05/31/2006   SpO2 92%   BMI 29.87 kg/m       0700 - 1500  Patient alert and oriented. VS stable. Patient on room air. Patient complains of pain. PRN oxy given (See MAR). Patient denies nausea. Urine Output - voiding without difficulty. Bowel Function - No BM during shift. Nutrition - Regular diet. PIV - saline locked. Clamshell - stapled, approximated, open to air. Activity - UAL. Education - Specialty pharmacy education completed during shift. Med card and lab book up to date. Plan of Care - Will continue with plan of care. Plan for patient to discharge later this evening.

## 2024-06-28 NOTE — TELEPHONE ENCOUNTER
Post Liver Transplant Coordinator Discharge Visit   Spoke with , Albino.     Patient informed of the need for primary care  appointments within 2 weeks if being discharged to home:yes    Patient informed of lab frequency and post transplant follow up with surgeon/NP: yes    Class attendance and educational needs have been addressed:yes   Patient has transplant handbooks, and other supplies as needed:yes  Patient knows the names of immunosuppressive medications:learning   Transition plan:home with     Pharmacy to be used after discharge:[place] except valcyte need to use goodrx.    Home Health Care:yes    Discussed no grape fruit or grape fruit juice. Discussed reading labels. Reviewed beverages that contain grape fruit/grape fruit juice.    Informed patient of annual derm appt for a transplant skin check and risk for skin cancer.    Reviewed if anyone other than the transplant team orders medications to contact the transplant office to review before taking.    Informed patient if becomes ill, forgets to take medications, or other emergencies on how to contact the transplant office.    Stent: yes     Due for remicade in July. She last had on 6/12.Dr Schilling.

## 2024-06-28 NOTE — PLAN OF CARE
Goal Outcome Evaluation:      Plan of Care Reviewed With: patient    Overall Patient Progress: improvingOverall Patient Progress: improving    BP (!) 148/78 (BP Location: Left arm)   Pulse 90   Temp 99  F (37.2  C) (Oral)   Resp 16   Wt 84.5 kg (186 lb 3.2 oz)   LMP 05/31/2006   SpO2 94%   BMI 30.99 kg/m      Shift: 2743-7908  Isolation Status: N/A  VS: stable on RA, afebrile  Neuro: A&O x4  Behaviors: receptive to cares  BG: N/A  Labs/Imaging: LFTs trending down; pending AM labs  Respiratory: WDL  Cardiac: WDL  Pain/Nausea: Denies nausea. C/O mild-moderate pain, given PRN Robaxin  PRN: Robaxin 500 mg PO x2 for pain  Diet: regular, poor appetite  IV Access: L PIV  Infusion(s): N/A  Lines/Drains: N/A  GI/: BM x1 on shift. Voiding adequately, hat in toilet to measure  Skin: clamshell incision stapled and CR TOWNSEND JP removal site CDI  Mobility: SBA with walker  Events/Education: Med card and lab book up to date as of 6/27  Plan: Discharge tomorrow pending team's assessment. Notify MD of any significant changes, continue plan of care.    Hand off to be given to oncoming RN.

## 2024-06-28 NOTE — PHARMACY-TRANSPLANT NOTE
Solid Organ Transplant Recipient Prior to Discharge Note    58 year old female s/p liver transplant on 6/22/2024.    Planned immunosuppression regimen per liver transplant protocol:  MAINTENANCE:   - Mycophenolate 750 mg BID  - Tacrolimus with goal trough levels of 8-12 mcg/L for first 3 months post-transplant   - Most recent tacrolimus level: 9.0 (from 6/28), increase to 1.5 mg twice daily   - Prednisone 5 mg daily for 3 months      Opportunistic pathogen prophylaxis includes:  - PJP: dapsone (G6PD within normal limits) due to sulfa allergy for 6 months duration  - CMV D+/R-: Valganciclovir for 6 months duration    Pharmacy has monitored for medication interactions and immunosuppression levels in conjunction with the multidisciplinary team. In anticipation for discharge, medication therapy needs have been addressed daily throughout the current admission via multidisciplinary rounds and/or discussions, order verification, daily clinical pharmacy review, and communication with prescribers.   Padma Yang Pharm.D., BCPS

## 2024-06-28 NOTE — TELEPHONE ENCOUNTER
Attempted to reach [place], but no answer. Left message.    Contacted Albino, spouse, he will return call to tx office.

## 2024-06-28 NOTE — PROGRESS NOTES
CLINICAL NUTRITION SERVICES    Reason for Assessment:  Post-Transplant (Post-Tx) nutrition education     Diet History:  Per pt, has not received formal nutrition education on post-Tx guidelines in the recent past.      Nutrition Diagnosis:  Food- and nutrition-related knowledge deficit r/t no recent post-Tx nutrition education AEB pt report of not receiving nutrition education on post-Tx diet guidelines in the recent past.      Interventions:  Nutrition Education  1. Provided verbal and written nutrition education on post-Tx nutrition guidelines. Nutrition education included need for increased protein and kcal needs post-op, food safety, and heart-healthy eating in the long-term.    2. Handout given: Guide to Your Diet after Transplant and Food Safety for Transplant Recipients booklet from the [place] [employer].    Goals:   Patient will verbalize at least three nutrition-related aspects of post-Tx diet guidelines.    Follow-up:    Patient to ask any further nutrition-related questions before discharge. In addition, pt may request outpatient RD appointment.     Shonna Blake RD, LD  Available on Vocera - can search by name or unit Dietitian  **Clinical Nutrition is no longer available via pager

## 2024-06-28 NOTE — TELEPHONE ENCOUNTER
A pharmacist spent 75 minutes providing medication teaching with Abiola Matute for discharge with a focus on new medications/dose changes.  Spouse (Albino) was present and asked most questions.  He was joined by two daughters midway through. The discharge medication list was reviewed with the patient/family and the following points were discussed, as applicable: Name, description, purpose, dose/strength, duration of medications, common side effects, food/medications to avoid, action to be taken if dose is missed, when to call MD, and how to obtain refills.  The patient and spouse (Albino) will be responsible for managing medications. Additionally, the following transplant related education was covered: Purpose of medication card, Medication videos, Timing of medications and day of lab draw considerations , Emesis, Prescription Insurance , and Discharge process for receiving meds   Patient will  transplant supplies including 7 day pill organizer, thermometer, and BP monitor at the discharge pharmacy along with medications.  Patient chooses to receive medications from [facility] specialty pharmacy.   Clinical Pharmacy Consult:                                                      Transplant Specific:   Date of Transplant: 6/22/2024  Type of Transplant: liver  First Transplant: yes  History of rejection: no    Immunosuppression Regimen   TAC 1.5 mg qAM & 1.5 mg qPM, Prednisone 5 mg qAM, and  mg qAM & 750 mg qPM  Patient specific goal: 8 to 12  Most recent level: 9, date 6/28/2024  Immunosuppressant Levels:  Therapeutic  Pt adherent to lab draws: yes  Scr:   Lab Results   Component Value Date    CR 0.88 06/28/2024    CR 0.71 02/23/2021     Side effects: change in taste    Prophylactic Medications  PJP Prophylactic: Dapsone 50 mg daily  Scheduled Discontinue Date: 6 months Anticipated date TBD    Antifungal: Nystatin  Scheduled Discontinue Date:  discontinued on discharge?  Anticipated date TBD    CMV Prophylactic:  Max dose in Liver transplant is Valcyte 450mg once daily   Scheduled Discontinue Date: 3 months Anticipated date TBD    Acid Reducer: Protonix (pantoprazole)  Scheduled Reviewed Date:  TBD    Vascular Prophylactic: Aspirin 81 mg PO daily  Scheduled Discontinue Date:  TBD        Reminders:  Bring to first clinic appt: med box, med card, bp monitor, all medications being taken, and lab book.  2.   MTM pharmacist visit on first clinic appt and if ok, again in 3 to 4 months during follow up appt.  3.   Avoid Grapefruit and Grapefruit juice.   4.   Avoid herbal supplements. If wish to take other medications or supplements, call your coordinator.   5.   Keep lab appts.   6.   Can use apps on phone like DOZ to help manage medication lists and reminders.   7.   Make sure you are protecting your skin by wearing long sleeves and applying sunscreen to exposed skin, for any significant time in the sun.     Transplant Coordinator is Zayra Landry [facility]

## 2024-06-28 NOTE — DISCHARGE SUMMARY
I evaluated the patient today.  I reviewed the discharge plan with the fellow, and agree with the final assessment and plan for discharge as noted in the discharge summary.  I personally spent  30 minutes or less  today on discharge activities for this patient.      Pravin Ball MD, DEBRA  Professor of Surgery  [employer]  Surgical Director of Liver Transplant Program  Executive Medical Director of Pediatric Transplantation   [facility]    Discharge Summary  Transplant Surgery    Date of Admission:  6/20/2024  Date of Discharge:  6/28/2024  Discharging Provider: Philly Webb NP / Haim Geronimo MD     Discharge Diagnoses   Active Problems:    Idiopathic thrombocytopenia (H)    Hyponatremia    Liver cirrhosis (H)    S/P liver transplant (H)    Immunosuppressed status (H24)    Acute post-operative pain    Insomnia    Anemia due to blood loss, acute    Moderate malnutrition (H24)    RADHA (acute kidney injury) (H24)    Hypomagnesemia    Procedure/Surgery Information   Procedure date:  06/22/24    Preoperative diagnosis:  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 Liver Transplant  2. Post liver perfusion liver biopsy     DCD donor on OCS normothermic perfusion 19.41 hours          Surgeon:  Surgeons and Role:     * Pravin Ball MD - Primary     * Isaac Vargas MD - Fellow - Assisting    Fellow/assistant:   aggie Hou   There was no qualified resident to assist with this procedure    Anesthesia:  General    Specimen:  Native liver, donor gallbladder    Drains:  1     Non-operative procedures 6/23/24 CVC placement     History of Present Illness   Abiola Matute is a 58 year old female with a history of  cirrhosis 2/2 DUNN/EtOH complicated by HCC (3.2 cm on seg 7) s/p MWA on 8/29/23 with Dr. Saavedra, thrombocytopenia (~100s), hyponatremia, and ascites.  Other history includes osteopenia, LE cellulitis, crohn's disease on infliximab, and obesity. S/p DCD  donor liver transplant w/ biliary stent placement (on OCS x 19.4 hours) on 24 with Dr. Ball.     Hospital Course   Abiola Matute was admitted on 2024.  The following problems were addressed during her hospitalization:    s/p DCD DDLT +stent 24: POD#6. Initially on alprostadil gtt, weaned off . LFTs continue to downtrend. Follow up US with patent vasculature.    - Continue ASA 81 mg daily and ursodiol 300 BID.     Immunosuppressed status secondary to medications:   Induction: Steroid taper per protocol complete.   Maintenance:    -  mg BID   - Tacrolimus 1.5 mg BID. Goal level 8-12.    - Prednisone 5 mg daily following taper.  Infection prophylaxis: viral (Valcyte x 6 months), PJP (sulfa allergy, G6PD normal, continue dapsone x 6 months)    Transplant coordinator: Zayra Paulino [phone].  Biliary stent:  Yes  Donor status:  DCD  CMV D + / R -  EBV D + / R +  Anticoagulation plan: ASA 81 mg daily x 6 months     Neuro:  Acute post op pain: Continue PRN oxycodone (weaning), Robaxin, Tylenol.  Insomnia: Trazodone at bedtime PRN.      Hematology:   Acute blood loss anemia: Hgb 8-9, stable.  Chronic thrombocytopenia: r/t liver disease. PLT 90's, improving.      GI/Nutrition:   Moderate malnutrition in the context of chronic illness: Nutrition consulted. Continue regular diet, protein supplements.     Fluid/Electrolytes:   Hyponatremia: Na 132, possibly due to hypervolemia. Received Lasix PRN while inpatient, monitor.  RADHA: Cr peaked at 1, now trending down to 0.9. Baseline ~0.6. Monitor.  Hypomagnesemia: Mag-Ox 400 mg daily.         Discharge Disposition   Discharged to home   Condition at discharge: Stable    Pending Results   These results will be followed up by Transplant team  Unresulted Labs Ordered in the Past 30 Days of this Admission       Date and Time Order Name  Status Description    6/23/2024  6:17 AM Prepare plasma (unit) Preliminary     6/22/2024 10:05 AM Prepare pheresed platelets (unit) Preliminary     6/22/2024  8:00 AM Fungal or Yeast Culture Routine Preliminary     6/22/2024  8:00 AM Anaerobic Bacterial Culture Routine Preliminary           Final pathology results:   Case Report   Surgical Pathology Report                         Case: [id number]                                   Authorizing Provider:  Pravin Ball MD   Collected:           06/22/2024 10:01 AM           Ordering Location:     [facility] OR                 Received:            06/24/2024 09:45 AM           Pathologist:           Candida George MD                                                           Specimens:   A) - Liver, NATIVE LIVER AND GALLBLADDER                                                            B) - Gallbladder, donor gallbladder                                                                  C) - Other, post reperfusion biopsy                                                        Final Diagnosis   A. 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:   -See gross description for ablation site size   -No evidence to suggest pre-treatment vascular invasion or extrahepatic extension  Advanced background cirrhosis, inactive (Laennec fibrosis stage 4C):   -Compatible with UDNN/MASLD etiology, now burnt out    -The PAS and PAS-D stains show no abnormal alpha-1-antitrypsin profile      -Iron stain is positive (2-3+) for stainable parenchymal and Kupffer iron    -Bile ducts are present in normal numbers and architecture  Gallbladder: Cholelithiases with chronic cholecystitis      B. DONOR GALLBLADDER; CHOLECYSTECTOMY:  Cholelithiases, otherwise no significant morphologic abnormality      C. LIVER ALLOGRAFT, POST-REPERFUSION NEEDLE BIOPSY:  Severe ischemic type, preservation/reperfusion, necrosis  (70%) (See Comment)     Primary Care Physician   Linda Macdonald    Physical Exam   Temp: 98.6  F (37  C) Temp src: Oral BP: 130/76 Pulse: 91   Resp: 18 SpO2: 93 % O2 Device: None (Room air)    Vitals:    06/24/24 0600 06/27/24 1519 06/28/24 0627   Weight: 88.3 kg (194 lb 10.7 oz) 84.5 kg (186 lb 3.2 oz) 81.4 kg (179 lb 8 oz)     Vital Signs with Ranges  Temp:  [97.9  F (36.6  C)-99  F (37.2  C)] 98.6  F (37  C)  Pulse:  [90-94] 91  Resp:  [16-18] 18  BP: (130-148)/(76-87) 130/76  SpO2:  [92 %-94 %] 93 %  I/O last 3 completed shifts:  In: -   Out: 500 [Urine:500]    Constitutional: resting comfortably in bed  Eyes: EOMI  ENT: internal jugular line removed  Respiratory: unlabored on RA  Cardiovascular: perfused  GI: abdomen soft, non-distended. Incision C/D/I.    Genitourinary: no Conti present  Skin: warm, dry  Musculoskeletal: BLE edema  Neurologic: A&Ox4  Neuropsychiatric: calm    Consultations This Hospital Stay   SOCIAL WORK IP CONSULT  NUTRITION SERVICES ADULT IP CONSULT  PHYSICAL THERAPY ADULT IP CONSULT  OCCUPATIONAL THERAPY ADULT IP CONSULT  NURSING TO CONSULT FOR VASCULAR ACCESS CARE IP CONSULT  NURSING TO CONSULT FOR VASCULAR ACCESS CARE IP CONSULT  CARE MANAGEMENT / SOCIAL WORK IP CONSULT  PHARMACY IP CONSULT  NUTRITION SERVICES ADULT IP CONSULT  OCCUPATIONAL THERAPY ADULT IP CONSULT  PHYSICAL THERAPY ADULT IP CONSULT  PHARMACY IP CONSULT  SOT MEDICATION HISTORY IP PHARMACY CONSULT  REGIONAL ANESTHESIA PAIN SERVICE ADULT IP CONSULT  PHYSICAL THERAPY ADULT IP CONSULT  OCCUPATIONAL THERAPY ADULT IP CONSULT  NURSING TO CONSULT FOR VASCULAR ACCESS CARE IP CONSULT    Time Spent on this Encounter   I have spent greater than 30 minutes on this discharge.    Discharge Orders   Discharge Medications   Current Discharge Medication List        START taking these medications    Details   acetaminophen (TYLENOL) 325 MG tablet Take 2 tablets (650 mg) by mouth every 6 hours as needed for mild pain  Qty: 60 tablet,  Refills: 0    Associated Diagnoses: Acute post-operative pain      aspirin (ASA) 81 MG chewable tablet 1 tablet (81 mg) by Oral or Feeding Tube route daily  Qty: 30 tablet, Refills: 5    Associated Diagnoses: S/P liver transplant (H)      COMPOUND CONTAINING CONTROLLED SUBSTANCE (CMPD RX) - PHARMACY TO MIX COMPOUNDED MEDICATION PLEASE HOLD FOR NOW WHILE RECOVERING FROM LIVER TRANSPLANT.    Associated Diagnoses: Class 1 obesity with serious comorbidity and body mass index (BMI) of 30.0 to 30.9 in adult, unspecified obesity type      dapsone (ACZONE) 25 MG tablet 2 tablets (50 mg) by Oral or Feeding Tube route daily  Qty: 60 tablet, Refills: 5    Associated Diagnoses: S/P liver transplant (H)      magnesium oxide (MAG-OX) 400 MG tablet Take 1 tablet (400 mg) by mouth daily  Qty: 30 tablet, Refills: 2    Associated Diagnoses: Hypomagnesemia      methocarbamol (ROBAXIN) 500 MG tablet Take 1 tablet (500 mg) by mouth 4 times daily as needed for muscle spasms  Qty: 20 tablet, Refills: 0    Associated Diagnoses: Acute post-operative pain      mycophenolate (GENERIC EQUIVALENT) 250 MG capsule Take 3 capsules (750 mg) by mouth 2 times daily  Qty: 180 capsule, Refills: 11    Associated Diagnoses: S/P liver transplant (H)      oxyCODONE (ROXICODONE) 5 MG tablet 1 tablet (5 mg) by Oral or Feeding Tube route every 8 hours as needed for moderate pain  Qty: 5 tablet, Refills: 0    Associated Diagnoses: Acute post-operative pain      pantoprazole (PROTONIX) 40 MG EC tablet Take 1 tablet (40 mg) by mouth daily  Qty: 30 tablet, Refills: 2    Associated Diagnoses: S/P liver transplant (H)      polyethylene glycol (MIRALAX) 17 GM/Dose powder Take 17 g by mouth 2 times daily as needed for constipation  Qty: 510 g, Refills: 0    Associated Diagnoses: S/P liver transplant (H)      predniSONE (DELTASONE) 5 MG tablet 1 tablet (5 mg) by Oral or Feeding Tube route daily  Qty: 30 tablet, Refills: 2    Associated Diagnoses: S/P liver transplant  (H)      senna-docusate (SENOKOT-S/PERICOLACE) 8.6-50 MG tablet 1-2 tablets by Oral or Feeding Tube route 2 times daily as needed for constipation  Qty: 60 tablet, Refills: 0    Associated Diagnoses: S/P liver transplant (H)      !! tacrolimus (GENERIC EQUIVALENT) 0.5 MG capsule Take 1 capsule (0.5 mg) by mouth 2 times daily Total discharge dose = 1.5 mg BID  Qty: 60 capsule, Refills: 11    Associated Diagnoses: S/P liver transplant (H)      !! tacrolimus (GENERIC EQUIVALENT) 1 MG capsule Take 1 capsule (1 mg) by mouth 2 times daily Total discharge dose = 1.5 mg BID  Qty: 60 capsule, Refills: 11    Associated Diagnoses: S/P liver transplant (H)      traZODone (DESYREL) 50 MG tablet Take 0.5-1 tablets (25-50 mg) by mouth nightly as needed for sleep  Qty: 30 tablet, Refills: 0    Associated Diagnoses: Insomnia, unspecified type      ursodiol (ACTIGALL) 300 MG capsule 1 capsule (300 mg) by Oral or Feeding Tube route 2 times daily  Qty: 60 capsule, Refills: 2    Associated Diagnoses: S/P liver transplant (H)       !! - Potential duplicate medications found. Please discuss with provider.        CONTINUE these medications which have CHANGED    Details   inFLIXimab (REMICADE) 100 MG injection HOLD FOR NOW AND DISCUSS RESTARTING WITH RECURRENCE OF SYMPTOMS.    Associated Diagnoses: Crohn's disease of large intestine with fistula (H)           CONTINUE these medications which have NOT CHANGED    Details   ammonium lactate (AMLACTIN) 12 % external cream Apply topically 2 times daily  Qty: 385 g, Refills: 3    Associated Diagnoses: Dry skin; Fissure in skin of both feet      clobetasol (TEMOVATE) 0.05 % external cream Apply to AA BID x 1-2 weeks then PRN. Do not apply to face.  Qty: 60 g, Refills: 3    Associated Diagnoses: Venous stasis dermatitis of left lower extremity      triamcinolone (KENALOG) 0.1 % external cream Apply to AA BID x 1-2 week then PRN only  Qty: 80 g, Refills: 3    Associated Diagnoses: Psoriasis       valGANciclovir (VALCYTE) 450 MG tablet Take 1 tablet (450 mg) by mouth daily  Qty: 180 tablet, Refills: 0    Associated Diagnoses: S/P liver transplant (H)           STOP taking these medications       COMPOUNDED NON-CONTROLLED SUBSTANCE (CMPD RX) - PHARMACY TO MIX COMPOUNDED MEDICATION Comments:   Reason for Stopping:         furosemide (LASIX) 40 MG tablet Comments:   Reason for Stopping:         magnesium 250 MG tablet Comments:   Reason for Stopping:         Multiple Vitamins-Minerals (MULTIVITAMIN & MINERAL PO) Comments:   Reason for Stopping:         potassium 99 MG TABS Comments:   Reason for Stopping:         spironolactone (ALDACTONE) 100 MG tablet Comments:   Reason for Stopping:                  Home Care Referral      Reason for your hospital stay    You were admitted for a liver transplant. You are discharging home in stable condition with close follow up.     Activity    Your activity upon discharge: Walk at least four times a day, lift no greater than 10 pounds for 8 weeks from the time of surgery.  No driving while taking narcotics or 3 weeks after surgery.     Adult [facility] Follow-up and recommended labs and tests    [facility] FOLLOW UP:     1. Follow up in Transplant Clinic weekly for the next 5 weeks with Dr. Ball (or any available liver transplant surgeon).     2. Follow up with Transplant Hepatology in 3 months.     3. Abdominal x-ray 3 months post-transplant to evaluate for biliary stent.    4. HCC. Follow up with Oncology in 3 months.     Call your Transplant Coordinator ([phone]) with questions about Transplant Center appointment scheduling.     LABS:     CBC, BMP, magnesium, phosphorus, hepatic panel, tacrolimus level every Monday and Thursday by home health care nurse if arranged, or at an outpatient lab.     Monitor and record    Monitor weight daily.     When to contact your care team    WHEN TO CONTACT YOUR  COORDINATOR:     Transplant Coordinator  [phone]     Notify your coordinator if you have pain over your liver, increased redness or drainage from your incision, fever greater than 100F, or yellowing of skin or eyes.     Notify your coordinator immediately if you are ever unable to take your immunosuppressive medications for any reason.     If you have URGENT concerns after office hours, please call the hospital switchboard at [phone] and ask to have the organ transplant nurse on-call paged. If you have a life-threatening emergency, go to the nearest emergency room.     Wound care and dressings    Instructions to care for your wound at home: If you have staples in place, they will be removed in 3 weeks after operation. Wash incision daily with soap and water. Do not soak or scrub.     Walker Order     Diet    Diet recommendations post-transplant: Heart healthy dietary habits long term (low saturated/trans fat, low sodium). High protein diet x 8 weeks. Practice food safety precautions.         Data   Most Recent 3 CBC's:  Recent Labs   Lab Test 06/28/24  0643 06/27/24  0650 06/26/24  0600   WBC 11.4* 9.3 10.5   HGB 9.3* 9.0* 8.4*   MCV 95 95 95   PLT 91* 74* 86*      Most Recent 3 BMP's:  Recent Labs   Lab Test 06/28/24  0643 06/27/24  1733 06/27/24  1229 06/27/24  0823 06/27/24  0650 06/26/24  0754 06/26/24  0600   *  --   --   --  134*  --  132*   POTASSIUM 4.4  --   --   --  4.2  --  4.2   CHLORIDE 100  --   --   --  101  --  100   CO2 25  --   --   --  23  --  24   BUN 21.2  --   --   --  30.7*  --  39.4*   CR 0.88  --   --   --  0.90  --  1.04*   ANIONGAP 7  --   --   --  10  --  8   ARIEL 7.9*  --   --   --  8.0*  --  8.2*   * 187* 129*   < > 109*   < > 142*    < > = values in this interval not displayed.     Most Recent 2 LFT's:  Recent Labs   Lab Test 06/28/24  0643 06/27/24  0650   AST 43 55*   * 280*   ALKPHOS 142 138   BILITOTAL 1.8* 2.1*     Most Recent INR's and Anticoagulation Dosing History:  Anticoagulation Dose  History  More data exists         Latest Ref Rng & Units 6/12/2024 6/20/2024 6/22/2024 6/23/2024 6/24/2024 6/25/2024 6/26/2024   Recent Dosing and Labs   INR 0.85 - 1.15 1.17  1.24  1.84  2.17  3.43  2.04  2.24  1.74  1.45  1.23       Details          Multiple values from one day are sorted in reverse-chronological order             Most Recent 3 Troponin's:  Recent Labs   Lab Test 10/21/19  0211   TROPI <0.015     Most Recent Cholesterol Panel:  Recent Labs   Lab Test 02/14/23  0824   CHOL 149   LDL 80   HDL 53   TRIG 78     Most Recent 6 Bacteria Isolates From Any Culture (See EPIC Reports for Culture Details):  Recent Labs   Lab Test 10/09/19  0950 03/28/19  1021 03/28/19  1009 02/26/18  1607 11/02/16  1044 08/03/16  1040   CULT No growth Moderate growth  Streptococcus agalactiae sero group B  *  Light growth  Escherichia coli  *  Moderate growth  Streptococcus anginosus  *  Light growth  Strain 2  Escherichia coli  * Heavy growth  Mixed fecal sabrina    Unable to determine the presence of Actinomyces species due to an overgrowth of normal   sabrina.    Moderate growth  Bacteroides fragilis  Susceptibility testing not routinely done  *  Moderate growth  Mixed aerobic and anaerobic sabrina  *  Moderate growth  Streptococcus agalactiae sero group B  Susceptibility testing done on previous specimen  *  Light growth  Citrobacter freundii complex  *  Light growth  Escherichia coli  Susceptibility testing done on previous specimen  *  Moderate growth  Enterococcus faecalis  *  Light growth  Streptococcus anginosus  Susceptibility testing done on previous specimen  * >100,000 colonies/mL  Escherichia coli  * 10,000 to 50,000 colonies/mL mixed urogenital sabrina Susceptibility testing not   routinely done   Moderate growth Serratia marcescens  Moderate growth Coagulase negative Staphylococcus Susceptibility testing not   routinely done  *     Most Recent TSH, T4 and A1c Labs:  Recent Labs   Lab Test 06/22/24  1613  02/14/23  0824   TSH  --  2.63   A1C 5.1 4.7     Results for orders placed or performed during the hospital encounter of 06/20/24   XR Chest 2 Views    Narrative    EXAM: XR CHEST 2 VIEWS  6/20/2024 5:40 PM      HISTORY: pre-op liver transplant    COMPARISON: 2/14/2024    FINDINGS: Two views of the chest. Trachea is midline. Cardiac  silhouette is within normal limits. No focal consolidation. No pleural  effusion or pneumothorax. Visualized upper abdomen is unremarkable. No  acute osseous abnormalities. Partially visualized right upper quadrant  biliary stent.      Impression    IMPRESSION: No focal airspace disease.    I have personally reviewed the examination and initial interpretation  and I agree with the findings.    CHERRY HEADLEY MD         SYSTEM ID:  [id number]   CT Chest w/o Contrast    Narrative    EXAM: CT CHEST W/O CONTRAST 6/20/2024 8:42 PM    HISTORY: 58 years Female annual lung nodule surveillance, needs to be  completed pre liver transplant.    COMPARISON: Chest CT 7/7/2023.    TECHNIQUE: Helical CT imaging of the chest was obtained without  contrast. Multiplanar post-processed and MIP reformats were obtained.  Images reviewed in soft tissue, bone, and lung windows.     FINDINGS:   image(s): Noncontributory.    LINES/TUBES: None.    LOWER NECK: Thyroid unremarkable.    LUNG: Resolved sub-3 mm solid pulmonary nodule involving the  midportion of the right major fissure (series 4, image 166). No new  suspicious or enlarging pulmonary nodules. No pneumothorax or pleural  effusion. No pleural-based mass or calcification. Stable focus of hazy  and linear stranding in the paramedian right lower lobe.    AIRWAYS: Patent tracheobronchial tree without intraluminal mass. No  bronchial wall thickening.    VASCULAR: Limited evaluation secondary to noncontrast technique. The  branching pattern of the great vessels off the aorta appears to  demonstrate a bovine arch morphology. The mid ascending thoracic  aorta  measures 4.1 cm in axial dimension, previously 4.1 cm.    CARDIAC: No cardiomegaly. No pericardial effusion. Mild coronary  artery atherosclerosis.     MEDIASTINUM/EM: No suspicious lymphadenopathy.    CHEST WALL: Unremarkable.    ESOPHAGUS: Unremarkable.    UPPER ABDOMEN: Limited evaluation due to lack of contrast. Cirrhotic  appearance of the liver. Multiple abdominal varices are noted Biliary  stent is partially visualized. Trace ascites    MUSCULOSKELETAL: Degenerative changes in keeping with the patient's  age. No acute osseous abnormality. No suspicious osseous lesion.    SOFT TISSUES: Unremarkable.      Impression    IMPRESSION:   1.  Resolved nodule along the right major fissure.  2.  Aneurysmal dilation of the mid ascending thoracic aorta up to 4.1  cm, stable compared to 7/7/2023 CT.  3.  Cirrhotic appearance of the liver with multiple abdominal varices  appreciated. Findings suggest portal hypertension. Minimal change  compared to the prior examination.    I have personally reviewed the examination and initial interpretation  and I agree with the findings.    CHERRY HEADLEY MD         SYSTEM ID:  [id number]   XR Chest Port 1 View    Narrative    Exam: XR CHEST PORT 1 VIEW, 6/22/2024 2:34 PM    Indication: eval central line    Comparison: CT 6/20/2024. Chest x-ray 6/20/2024    Findings:   Right IJ central venous catheter with tip terminating in the high SVC.  The cardiac silhouette size is stable. Borderline low lung volumes.  Mild bibasilar streaky opacities, favors atelectasis. No pneumothorax  or pleural effusion. Hemidiaphragms are symmetric. Upper abdomen is  unremarkable. No acute osseous mallet.      Impression    Impression:   1. Right IJ central venous catheter tip is within the SVC.  2. No acute cardiac pulmonary process. Basilar atelectasis is noted     I have personally reviewed the examination and initial interpretation  and I agree with the findings.    JARETT ARRINGTON MD          SYSTEM ID:  [id number]   US Liver Transplant    Narrative    EXAMINATION: US LIVER TRANSPLANT, 6/22/2024 4:51 PM     COMPARISON: None.    HISTORY: s/p liver transplant    TECHNIQUE:  Gray-scale, color Doppler and spectral flow analysis.    FINDINGS:   There is no ascites. Small right pleural effusion.    Liver:   The liver demonstrates normal homogeneous echotexture. No  evidence of a focal hepatic mass. Perihepatic fluid collection  measuring 5.0 x 3.0 x 5.4 cm.    Bile Ducts: No intrahepatic biliary dilatation. Common bile duct not  visualized.    Gallbladder: The gallbladder is surgically absent.    Kidneys:   Right kidney:  The right kidney demonstrates normal echotexture with  no evidence of a shadowing stone, focal mass or hydronephrosis.   10.1  cm in long axis dimension.  Left kidney:  The left kidney demonstrates normal echotexture with no  evidence of a shadowing stone, focal mass or hydronephrosis.   10.3 cm  in long axis dimension.    Pancreas: Poorly visualized.    Spleen:  The spleen measures 13.2 cm. Splenic parenchyma poorly  visualized.    Visualized portions of the aorta are unremarkable.    Bladder with Conti in place.    LIVER DOPPLER:  Splenic vein:  Patent continuous normal antegrade direction flow  towards the liver, 53 cm/sec.  Extrahepatic portal vein:  Patent continuous antegrade flow, 94  cm/sec.  Portal vein at anastomosis: Patent continuous antegrade flow, 106  cm/sec.  Intrahepatic portal vein:  Patent continuous antegrade flow, 99  cm/sec.  Right portal vein flow is antegrade, measuring 78 cm/sec.  Left portal vein flow is antegrade, measuring 28 cm/sec.    Inferior vena cava: patent with flow toward the heart throughout..  IVC above anastomosis:  143 cm/sec.  IVC at anastomosis:  159 cm/sec.  Intrahepatic IVC:  130 cm/sec.  Extrahepatic IVC:  111 cm/sec.    Right, mid, left hepatic veins: Patent with flow towards the inferior  vena cava.    Extrahepatic hepatic artery: Low resistance  waveform with flow towards  the liver. 198 cm/sec with resistive index 0.67.  Intrahepatic hepatic artery: Low resistance waveform with flow towards  the liver. 82 cm/sec with resistive index 0.67.  Right hepatic artery: 56 cm/sec with resistive index 0.42.  Left hepatic artery: 74 cm/sec with resistive index 0.58.      Impression    Impression:   1.  Patent Doppler exam of the transplant liver. Slightly low right  hepatic artery resistive index which could be related to postoperative  edema.  2.  Unremarkable appearance of the transplant liver.  3.  Perihepatic fluid culture measuring 5.4 cm. Small right pleural  effusion.  4.  The common bile duct and pancreas are not visualized. Splenic  parenchyma also poorly visualized.    I have personally reviewed the examination and initial interpretation  and I agree with the findings.    JARETT ARRINGTON MD         SYSTEM ID:  [id number]    Liver Transplant Follow Up    Narrative    EXAMINATION:  LIVER TRANSPLANT FOLLOW UP, 6/23/2024 8:00 AM     COMPARISON: 6/22/2024.    HISTORY: Elevated LFTs s/p liver transplant; follow up    TECHNIQUE:  Grey-scale, color Doppler and spectral flow analysis.    FINDINGS:  Two perihepatic collections identified, medial to the right lobe  measuring 3.7 x 9.5 x 2.2 cm and in the Morison's pouch measuring 7.6  x 4.9 x 3.3 cm.    CBD measures 3.2 mm.     LIVER DOPPLER:  Splenic vein:  Patent continuous normal antegrade direction flow  towards the liver, 44 cm/sec.  Extrahepatic portal vein:  Patent continuous antegrade flow, 38  cm/sec.  Portal vein at anastomosis: Patent continuous antegrade flow, 80  cm/sec.  Intrahepatic portal vein:  Patent continuous antegrade flow, 94  cm/sec.  Right portal vein flow is antegrade, measuring 22 cm/sec.  Left portal vein flow is antegrade, measuring 44 cm/sec.    Inferior vena cava patent with flow toward the heart throughout..  IVC above anastomosis:  Not seen.  IVC at anastomosis:  135  cm/sec.  Intrahepatic IVC:  104 cm/sec.  Extrahepatic IVC:  37 cm/sec.    Right, mid, left hepatic veins: Patent with flow towards the inferior  vena cava.    Extrahepatic hepatic artery: Low resistance waveform with flow towards  the liver. 27 cm/sec with resistive index 0.91.  Right hepatic artery: 17 cm/sec with resistive index 0.83.  Left hepatic artery: 32 cm/sec with resistive index 0.88.      Impression    Impression:   1.  Patent hepatic transplant doppler ultrasound. There is interval  increased hepatic artery resistive indices with reduced systolic flow,  diastolic flow is preserved. Findings may relate to postoperative  edema in this clinical setting. Recommend close follow-up Doppler  ultrasound until flow normalization.   2.  Two peritransplant collections measuring up to 9.5 cm and 7.6 cm  respectively.     YVETTE ORTEGA MD         SYSTEM ID:  [id number]   XR Chest Port 1 View    Narrative    Exam: XR CHEST PORT 1 VIEW, 6/23/2024 1:51 PM    Comparison: Radiograph 6/22/2024, CT chest 6/20/2024    History: R ij cvc exchange    Findings:  Portable AP view of the chest. Right IJ central venous catheter tip  projects over the high right atrium. Trachea is midline.  Cardiomediastinal silhouette is within normal limits. Mild streaky  bibasilar opacities, likely atelectasis. No pleural effusion or  pneumothorax. Visualized upper abdomen is unremarkable. No acute  osseous abnormality.      Impression    Impression:   1. Right IJ central venous catheter tip projects over the high right  atrium.  2. Mild streaky bibasilar atelectasis.    I have personally reviewed the examination and initial interpretation  and I agree with the findings.    JARETT ARRINGTON MD         SYSTEM ID:  [id number]     *Note: Due to a large number of results and/or encounters for the requested time period, some results have not been displayed. A complete set of results can be found in Results Review.

## 2024-06-28 NOTE — TELEPHONE ENCOUNTER
Patient Call: General  Route to LPN    Reason for call:  returning call back from earlier today.     Call back needed? Yes    Return Call Needed  Same as documented in contacts section  When to return call?: Same day: Route High Priority

## 2024-06-28 NOTE — PLAN OF CARE
Vitals: /86 (BP Location: Left arm)   Pulse 97   Temp 98  F (36.7  C) (Oral)   Resp 18   Wt 81.4 kg (179 lb 8 oz)   LMP 05/31/2006   SpO2 92%   BMI 29.87 kg/m    Stable room air.  Blood glucose: na  Pain/nausea: denies.   Diet: regular.   Lines: PIV removed. Cvc sutures removed (extra from Wednesday).   : voids well.  GI: last bm yesterday.   Drains: old JACEK site dressing changed. Dressing supplies given.   Skin: skin tear on RUE dressing changed. New dressings sent. Cvc site dressing changed.   Mobility: up x 1 x walker.     Education : med/lab card updated. MTP started with family and pt. Handbook given. SPT seen.     Discharge education given and completed.

## 2024-06-28 NOTE — PROGRESS NOTES
CLINICAL NUTRITION SERVICES - DISCHARGE NOTE    Patient s discharge needs assessed and discharge planning has been conducted with the multidisciplinary transplant care team including physicians, pharmacy, social work and transplant coordinator.    Follow up/Monitoring:  Once discharged, place outpatient nutrition consult via the transplant team if nutrition concerns arise.     Shonna Blake RD, LD  Available on Vocera - can search by name or unit Dietitian  **Clinical Nutrition is no longer available via pager

## 2024-06-28 NOTE — DISCHARGE INSTRUCTIONS
Nutrition Services - Post-Transplant Diet Guidelines   Follow recommendations on the Guide to Your Diet after Transplant handout (summary below).    You have increased energy and protein needs for six to eight weeks after transplant. Your goal is to consume at least 100 grams of protein per day during this time frame.   Eat a heart-healthy diet (low sodium, low saturated and trans-fat) once you are outside of the 6-8 week post-transplant window, and once your appetite improves to normal  In some cases (but not in all cases), adjust potassium intake   Take calcium and vitamin D supplement if your doctor or team orders  Take measures to prevent food poisoning: store and prepare foods to the proper temperature, practice good handwashing, heat all deli meat (to 165 degrees Fahrenheit), avoid raw fish and meats (including uncooked eggs, non-pasteurized or raw milk, and mold-ripened, non-pasteurized, and raw cheeses [including Brie, Camembert, Roquefort, Stilton, Gorgonzola and Jaren or other soft, unpasteurized cheeses such as Mexican queso fresco]), and throw out leftovers older than two days.   Do not consume grapefruit or pomegranate-containing products. These can interact with your medications.   Avoid the following post txp d/t risk for rejection, unknown effects on the organs, and/or potential interactions with immunosuppressants:       - Herbal, Chinese, holistic, chiropractic, natural, alternative medicines and supplements       - Detoxes and cleanses       - Weight loss pills       - Protein powders or other products with extracts or herbs (ie green tea extract)  If you have any nutrition-related questions or concerns after discharge, please contact our outpatient transplant dietitian, Amber Ortega at [phone]

## 2024-06-28 NOTE — PROGRESS NOTES
Care Management Discharge Note    Discharge Date: 06/28/2024       Discharge Disposition: Home, Home Care, Acute Rehab    Discharge Services: Home Care    Discharge DME:  (TBD)    Discharge Transportation: family or friend will provide    Private pay costs discussed: Not applicable    Does the patient's insurance plan have a 3 day qualifying hospital stay waiver?  No    PAS Confirmation Code:    Patient/family educated on Medicare website which has current facility and service quality ratings: no    Education Provided on the Discharge Plan:    Persons Notified of Discharge Plans:patient and spouse  Patient/Family in Agreement with the Plan: yes    Handoff Referral Completed: Yes    Additional Information:  Pt s/p liver transplant.  Pt medically cleared for discharge today.  Pt    Updated [facility] who confirmed that agency is able to pull orders from [facility].  [facility] RN scheduled to open patient to services on Saturday 6/29.    Accepting [facility] Care  [facility]   [phone]   Fax [phone]      Susan Patel RN BSN, PHN, ACM-RN  June 28, 2024  Nurse Coordinator    Phone: [phone]    Social Work and Care Management Department     SEARCHABLE in [facility] - search CARE COORDINATOR     [place] ([phone]) Saturday & Sunday; ([phone]) [facility] Recognized Holidays     Units: 5A Onc Vocera & 5C Vocera     Units: 6B Vocera & 6C Vocera      Units: 7A SOT RNCC Vocera, 7B Med Surg Vocera, & 7C Med Surg Vocera      Units: 6A Vocera & 4A CVICU Vocera, 4C MICU Vocera, and 4E SICU Vocera      Units: 5 Ortho Vocera & 5 Med Surg Vocera      Units: 6 Med Surg Vocera & 8 Med Surg Vocera      6/28/2024

## 2024-06-29 ENCOUNTER — MEDICAL CORRESPONDENCE (OUTPATIENT)
Dept: HEALTH INFORMATION MANAGEMENT | Facility: CLINIC | Age: 59
End: 2024-06-29
Payer: MEDICARE

## 2024-06-29 LAB — BACTERIA FLD CULT: NORMAL

## 2024-06-29 NOTE — PLAN OF CARE
Physical Therapy Discharge Summary    Reason for therapy discharge:    Discharged to home with home therapy.    Progress towards therapy goal(s). See goals on Care Plan in [facility] electronic health record for goal details.  Goals partially met.  Barriers to achieving goals:   limited tolerance for therapy and discharge from facility.    Therapy recommendation(s):    Continued therapy is recommended.  Rationale/Recommendations:  Recommend HHPT to progress safety and IND with functional mobility towards PLOF, continued use of FWW, and family assist with ADL's requiring lifting >10lbs.

## 2024-06-30 ENCOUNTER — APPOINTMENT (OUTPATIENT)
Dept: CT IMAGING | Facility: CLINIC | Age: 59
DRG: 607 | End: 2024-06-30
Attending: STUDENT IN AN ORGANIZED HEALTH CARE EDUCATION/TRAINING PROGRAM
Payer: MEDICARE

## 2024-06-30 ENCOUNTER — TELEPHONE (OUTPATIENT)
Dept: TRANSPLANT | Facility: CLINIC | Age: 59
End: 2024-06-30

## 2024-06-30 ENCOUNTER — APPOINTMENT (OUTPATIENT)
Dept: GENERAL RADIOLOGY | Facility: CLINIC | Age: 59
DRG: 607 | End: 2024-06-30
Attending: STUDENT IN AN ORGANIZED HEALTH CARE EDUCATION/TRAINING PROGRAM
Payer: MEDICARE

## 2024-06-30 ENCOUNTER — HOSPITAL ENCOUNTER (INPATIENT)
Facility: CLINIC | Age: 59
LOS: 6 days | Discharge: HOME HEALTH | DRG: 607 | End: 2024-07-06
Attending: STUDENT IN AN ORGANIZED HEALTH CARE EDUCATION/TRAINING PROGRAM | Admitting: TRANSPLANT SURGERY
Payer: MEDICARE

## 2024-06-30 DIAGNOSIS — E87.1 HYPONATREMIA: ICD-10-CM

## 2024-06-30 DIAGNOSIS — J90 PLEURAL EFFUSION ON RIGHT: ICD-10-CM

## 2024-06-30 DIAGNOSIS — Z94.4 LIVER REPLACED BY TRANSPLANT (H): ICD-10-CM

## 2024-06-30 DIAGNOSIS — R50.9 FEVER, UNSPECIFIED FEVER CAUSE: ICD-10-CM

## 2024-06-30 DIAGNOSIS — Z94.4 S/P LIVER TRANSPLANT (H): ICD-10-CM

## 2024-06-30 DIAGNOSIS — J18.9 PNEUMONIA OF RIGHT LUNG DUE TO INFECTIOUS ORGANISM, UNSPECIFIED PART OF LUNG: ICD-10-CM

## 2024-06-30 DIAGNOSIS — L85.3 XEROSIS CUTIS: Primary | ICD-10-CM

## 2024-06-30 DIAGNOSIS — E83.42 HYPOMAGNESEMIA: ICD-10-CM

## 2024-06-30 LAB
ACANTHOCYTES BLD QL SMEAR: ABNORMAL
ALBUMIN SERPL BCG-MCNC: 2.8 G/DL (ref 3.5–5.2)
ALBUMIN SERPL BCG-MCNC: 3.1 G/DL (ref 3.5–5.2)
ALBUMIN UR-MCNC: 10 MG/DL
ALLEN'S TEST: YES
ALP SERPL-CCNC: 121 U/L (ref 40–150)
ALP SERPL-CCNC: 140 U/L (ref 40–150)
ALT SERPL W P-5'-P-CCNC: 114 U/L (ref 0–50)
ALT SERPL W P-5'-P-CCNC: 93 U/L (ref 0–50)
ANION GAP SERPL CALCULATED.3IONS-SCNC: 10 MMOL/L (ref 7–15)
ANION GAP SERPL CALCULATED.3IONS-SCNC: 7 MMOL/L (ref 7–15)
APPEARANCE UR: CLEAR
AST SERPL W P-5'-P-CCNC: 22 U/L (ref 0–45)
AST SERPL W P-5'-P-CCNC: 25 U/L (ref 0–45)
AUER BODIES BLD QL SMEAR: ABNORMAL
BASE EXCESS BLDA CALC-SCNC: -0.5 MMOL/L (ref -3–3)
BASE EXCESS BLDV CALC-SCNC: -13 MMOL/L (ref -3–3)
BASE EXCESS BLDV CALC-SCNC: -14.6 MMOL/L (ref -3–3)
BASO STIPL BLD QL SMEAR: ABNORMAL
BASOPHILS # BLD AUTO: 0 10E3/UL (ref 0–0.2)
BASOPHILS # BLD AUTO: 0 10E3/UL (ref 0–0.2)
BASOPHILS NFR BLD AUTO: 0 %
BASOPHILS NFR BLD AUTO: 0 %
BILIRUB SERPL-MCNC: 1.7 MG/DL
BILIRUB SERPL-MCNC: 1.8 MG/DL
BILIRUB UR QL STRIP: NEGATIVE
BITE CELLS BLD QL SMEAR: ABNORMAL
BLISTER CELLS BLD QL SMEAR: ABNORMAL
BUN SERPL-MCNC: 10.5 MG/DL (ref 8–23)
BUN SERPL-MCNC: 8.6 MG/DL (ref 8–23)
BURR CELLS BLD QL SMEAR: ABNORMAL
CALCIUM SERPL-MCNC: 7.7 MG/DL (ref 8.6–10)
CALCIUM SERPL-MCNC: 8.2 MG/DL (ref 8.6–10)
CHLORIDE SERPL-SCNC: 102 MMOL/L (ref 98–107)
CHLORIDE SERPL-SCNC: 97 MMOL/L (ref 98–107)
COHGB MFR BLD: 97.3 % (ref 96–97)
COLOR UR AUTO: YELLOW
CREAT SERPL-MCNC: 0.63 MG/DL (ref 0.51–0.95)
CREAT SERPL-MCNC: 0.7 MG/DL (ref 0.51–0.95)
DACRYOCYTES BLD QL SMEAR: ABNORMAL
DEPRECATED HCO3 PLAS-SCNC: 21 MMOL/L (ref 22–29)
DEPRECATED HCO3 PLAS-SCNC: 21 MMOL/L (ref 22–29)
EGFRCR SERPLBLD CKD-EPI 2021: >90 ML/MIN/1.73M2
EGFRCR SERPLBLD CKD-EPI 2021: >90 ML/MIN/1.73M2
ELLIPTOCYTES BLD QL SMEAR: ABNORMAL
EOSINOPHIL # BLD AUTO: 0.6 10E3/UL (ref 0–0.7)
EOSINOPHIL # BLD AUTO: 0.7 10E3/UL (ref 0–0.7)
EOSINOPHIL NFR BLD AUTO: 3 %
EOSINOPHIL NFR BLD AUTO: 3 %
ERYTHROCYTE [DISTWIDTH] IN BLOOD BY AUTOMATED COUNT: 18.1 % (ref 10–15)
ERYTHROCYTE [DISTWIDTH] IN BLOOD BY AUTOMATED COUNT: 18.2 % (ref 10–15)
FRAGMENTS BLD QL SMEAR: ABNORMAL
GLUCOSE SERPL-MCNC: 115 MG/DL (ref 70–99)
GLUCOSE SERPL-MCNC: 148 MG/DL (ref 70–99)
GLUCOSE UR STRIP-MCNC: NEGATIVE MG/DL
HCO3 BLD-SCNC: 23 MMOL/L (ref 21–28)
HCO3 BLDV-SCNC: 16 MMOL/L (ref 21–28)
HCO3 BLDV-SCNC: 18 MMOL/L (ref 21–28)
HCT VFR BLD AUTO: 25.6 % (ref 35–47)
HCT VFR BLD AUTO: 26.7 % (ref 35–47)
HGB BLD-MCNC: 8.6 G/DL (ref 11.7–15.7)
HGB BLD-MCNC: 8.9 G/DL (ref 11.7–15.7)
HGB C CRYSTALS: ABNORMAL
HGB UR QL STRIP: NEGATIVE
HOLD SPECIMEN: NORMAL
HOWELL-JOLLY BOD BLD QL SMEAR: ABNORMAL
IMM GRANULOCYTES # BLD: 0.7 10E3/UL
IMM GRANULOCYTES # BLD: 1.1 10E3/UL
IMM GRANULOCYTES NFR BLD: 3 %
IMM GRANULOCYTES NFR BLD: 4 %
KETONES UR STRIP-MCNC: NEGATIVE MG/DL
LACTATE BLD-SCNC: 1.1 MMOL/L
LACTATE SERPL-SCNC: 1.2 MMOL/L (ref 0.7–2)
LACTATE SERPL-SCNC: 1.2 MMOL/L (ref 0.7–2)
LEUKOCYTE ESTERASE UR QL STRIP: ABNORMAL
LIPASE SERPL-CCNC: 24 U/L (ref 13–60)
LYMPHOCYTES # BLD AUTO: 0.2 10E3/UL (ref 0.8–5.3)
LYMPHOCYTES # BLD AUTO: 0.4 10E3/UL (ref 0.8–5.3)
LYMPHOCYTES NFR BLD AUTO: 1 %
LYMPHOCYTES NFR BLD AUTO: 2 %
MAGNESIUM SERPL-MCNC: 1.4 MG/DL (ref 1.7–2.3)
MCH RBC QN AUTO: 32.8 PG (ref 26.5–33)
MCH RBC QN AUTO: 32.8 PG (ref 26.5–33)
MCHC RBC AUTO-ENTMCNC: 33.3 G/DL (ref 31.5–36.5)
MCHC RBC AUTO-ENTMCNC: 33.6 G/DL (ref 31.5–36.5)
MCV RBC AUTO: 98 FL (ref 78–100)
MCV RBC AUTO: 99 FL (ref 78–100)
MONOCYTES # BLD AUTO: 0.2 10E3/UL (ref 0–1.3)
MONOCYTES # BLD AUTO: 0.4 10E3/UL (ref 0–1.3)
MONOCYTES NFR BLD AUTO: 1 %
MONOCYTES NFR BLD AUTO: 1 %
NEUTROPHILS # BLD AUTO: 22.8 10E3/UL (ref 1.6–8.3)
NEUTROPHILS # BLD AUTO: 23.6 10E3/UL (ref 1.6–8.3)
NEUTROPHILS NFR BLD AUTO: 91 %
NEUTROPHILS NFR BLD AUTO: 91 %
NEUTS HYPERSEG BLD QL SMEAR: ABNORMAL
NITRATE UR QL: NEGATIVE
NRBC # BLD AUTO: 0 10E3/UL
NRBC # BLD AUTO: 0 10E3/UL
NRBC BLD AUTO-RTO: 0 /100
NRBC BLD AUTO-RTO: 0 /100
O2/TOTAL GAS SETTING VFR VENT: 21 %
O2/TOTAL GAS SETTING VFR VENT: 21 %
OXYHGB MFR BLDV: 91 % (ref 70–75)
PCO2 BLD: 31 MM HG (ref 35–45)
PCO2 BLDV: 63 MM HG (ref 40–50)
PCO2 BLDV: 68 MM HG (ref 40–50)
PH BLD: 7.48 [PH] (ref 7.35–7.45)
PH BLDV: 7.02 [PH] (ref 7.32–7.43)
PH BLDV: 7.03 [PH] (ref 7.32–7.43)
PH UR STRIP: 8 [PH] (ref 5–7)
PHOSPHATE SERPL-MCNC: 2.7 MG/DL (ref 2.5–4.5)
PLAT MORPH BLD: ABNORMAL
PLATELET # BLD AUTO: 171 10E3/UL (ref 150–450)
PLATELET # BLD AUTO: 197 10E3/UL (ref 150–450)
PO2 BLD: 77 MM HG (ref 80–105)
PO2 BLDV: 100 MM HG (ref 25–47)
PO2 BLDV: 83 MM HG (ref 25–47)
POLYCHROMASIA BLD QL SMEAR: SLIGHT
POTASSIUM SERPL-SCNC: 4.5 MMOL/L (ref 3.4–5.3)
POTASSIUM SERPL-SCNC: 4.6 MMOL/L (ref 3.4–5.3)
PROCALCITONIN SERPL IA-MCNC: 0.71 NG/ML
PROT SERPL-MCNC: 5.1 G/DL (ref 6.4–8.3)
PROT SERPL-MCNC: 5.7 G/DL (ref 6.4–8.3)
RBC # BLD AUTO: 2.62 10E6/UL (ref 3.8–5.2)
RBC # BLD AUTO: 2.71 10E6/UL (ref 3.8–5.2)
RBC AGGLUT BLD QL: ABNORMAL
RBC MORPH BLD: ABNORMAL
RBC URINE: <1 /HPF
ROULEAUX BLD QL SMEAR: ABNORMAL
SAO2 % BLDA: 94 % (ref 92–100)
SAO2 % BLDV: 89 % (ref 70–75)
SAO2 % BLDV: 94.6 % (ref 70–75)
SICKLE CELLS BLD QL SMEAR: ABNORMAL
SMUDGE CELLS BLD QL SMEAR: ABNORMAL
SODIUM SERPL-SCNC: 128 MMOL/L (ref 135–145)
SODIUM SERPL-SCNC: 130 MMOL/L (ref 135–145)
SP GR UR STRIP: 1.02 (ref 1–1.03)
SPHEROCYTES BLD QL SMEAR: ABNORMAL
SQUAMOUS EPITHELIAL: 4 /HPF
STOMATOCYTES BLD QL SMEAR: ABNORMAL
TARGETS BLD QL SMEAR: ABNORMAL
TOXIC GRANULES BLD QL SMEAR: ABNORMAL
UROBILINOGEN UR STRIP-MCNC: NORMAL MG/DL
VARIANT LYMPHS BLD QL SMEAR: ABNORMAL
WBC # BLD AUTO: 24.7 10E3/UL (ref 4–11)
WBC # BLD AUTO: 26 10E3/UL (ref 4–11)
WBC URINE: 4 /HPF

## 2024-06-30 PROCEDURE — 36600 WITHDRAWAL OF ARTERIAL BLOOD: CPT

## 2024-06-30 PROCEDURE — 120N000011 HC R&B TRANSPLANT UMMC

## 2024-06-30 PROCEDURE — 87040 BLOOD CULTURE FOR BACTERIA: CPT | Performed by: STUDENT IN AN ORGANIZED HEALTH CARE EDUCATION/TRAINING PROGRAM

## 2024-06-30 PROCEDURE — 74177 CT ABD & PELVIS W/CONTRAST: CPT | Mod: 26 | Performed by: RADIOLOGY

## 2024-06-30 PROCEDURE — 84100 ASSAY OF PHOSPHORUS: CPT | Performed by: SURGERY

## 2024-06-30 PROCEDURE — 80053 COMPREHEN METABOLIC PANEL: CPT | Performed by: STUDENT IN AN ORGANIZED HEALTH CARE EDUCATION/TRAINING PROGRAM

## 2024-06-30 PROCEDURE — 83690 ASSAY OF LIPASE: CPT | Performed by: STUDENT IN AN ORGANIZED HEALTH CARE EDUCATION/TRAINING PROGRAM

## 2024-06-30 PROCEDURE — 84145 PROCALCITONIN (PCT): CPT | Performed by: STUDENT IN AN ORGANIZED HEALTH CARE EDUCATION/TRAINING PROGRAM

## 2024-06-30 PROCEDURE — 71046 X-RAY EXAM CHEST 2 VIEWS: CPT

## 2024-06-30 PROCEDURE — 258N000003 HC RX IP 258 OP 636: Performed by: STUDENT IN AN ORGANIZED HEALTH CARE EDUCATION/TRAINING PROGRAM

## 2024-06-30 PROCEDURE — 96365 THER/PROPH/DIAG IV INF INIT: CPT | Mod: 59 | Performed by: STUDENT IN AN ORGANIZED HEALTH CARE EDUCATION/TRAINING PROGRAM

## 2024-06-30 PROCEDURE — 83605 ASSAY OF LACTIC ACID: CPT | Performed by: STUDENT IN AN ORGANIZED HEALTH CARE EDUCATION/TRAINING PROGRAM

## 2024-06-30 PROCEDURE — 96368 THER/DIAG CONCURRENT INF: CPT | Performed by: STUDENT IN AN ORGANIZED HEALTH CARE EDUCATION/TRAINING PROGRAM

## 2024-06-30 PROCEDURE — 250N000011 HC RX IP 250 OP 636

## 2024-06-30 PROCEDURE — 250N000013 HC RX MED GY IP 250 OP 250 PS 637

## 2024-06-30 PROCEDURE — 81001 URINALYSIS AUTO W/SCOPE: CPT | Performed by: STUDENT IN AN ORGANIZED HEALTH CARE EDUCATION/TRAINING PROGRAM

## 2024-06-30 PROCEDURE — 71046 X-RAY EXAM CHEST 2 VIEWS: CPT | Mod: 26 | Performed by: RADIOLOGY

## 2024-06-30 PROCEDURE — 82805 BLOOD GASES W/O2 SATURATION: CPT | Performed by: STUDENT IN AN ORGANIZED HEALTH CARE EDUCATION/TRAINING PROGRAM

## 2024-06-30 PROCEDURE — 87493 C DIFF AMPLIFIED PROBE: CPT | Performed by: NURSE PRACTITIONER

## 2024-06-30 PROCEDURE — 36415 COLL VENOUS BLD VENIPUNCTURE: CPT | Performed by: SURGERY

## 2024-06-30 PROCEDURE — 83605 ASSAY OF LACTIC ACID: CPT | Performed by: SURGERY

## 2024-06-30 PROCEDURE — G1010 CDSM STANSON: HCPCS

## 2024-06-30 PROCEDURE — 83735 ASSAY OF MAGNESIUM: CPT | Performed by: SURGERY

## 2024-06-30 PROCEDURE — 250N000011 HC RX IP 250 OP 636: Performed by: STUDENT IN AN ORGANIZED HEALTH CARE EDUCATION/TRAINING PROGRAM

## 2024-06-30 PROCEDURE — 99221 1ST HOSP IP/OBS SF/LOW 40: CPT | Mod: 24 | Performed by: NURSE PRACTITIONER

## 2024-06-30 PROCEDURE — 258N000003 HC RX IP 258 OP 636

## 2024-06-30 PROCEDURE — 87799 DETECT AGENT NOS DNA QUANT: CPT | Performed by: STUDENT IN AN ORGANIZED HEALTH CARE EDUCATION/TRAINING PROGRAM

## 2024-06-30 PROCEDURE — G1010 CDSM STANSON: HCPCS | Performed by: RADIOLOGY

## 2024-06-30 PROCEDURE — 85025 COMPLETE CBC W/AUTO DIFF WBC: CPT | Performed by: STUDENT IN AN ORGANIZED HEALTH CARE EDUCATION/TRAINING PROGRAM

## 2024-06-30 PROCEDURE — 250N000012 HC RX MED GY IP 250 OP 636 PS 637: Performed by: NURSE PRACTITIONER

## 2024-06-30 PROCEDURE — 36415 COLL VENOUS BLD VENIPUNCTURE: CPT | Performed by: STUDENT IN AN ORGANIZED HEALTH CARE EDUCATION/TRAINING PROGRAM

## 2024-06-30 PROCEDURE — 84155 ASSAY OF PROTEIN SERUM: CPT | Performed by: SURGERY

## 2024-06-30 PROCEDURE — 99291 CRITICAL CARE FIRST HOUR: CPT | Performed by: STUDENT IN AN ORGANIZED HEALTH CARE EDUCATION/TRAINING PROGRAM

## 2024-06-30 PROCEDURE — 99285 EMERGENCY DEPT VISIT HI MDM: CPT | Mod: 25 | Performed by: STUDENT IN AN ORGANIZED HEALTH CARE EDUCATION/TRAINING PROGRAM

## 2024-06-30 PROCEDURE — 74177 CT ABD & PELVIS W/CONTRAST: CPT | Mod: MG

## 2024-06-30 PROCEDURE — 82803 BLOOD GASES ANY COMBINATION: CPT

## 2024-06-30 PROCEDURE — 82247 BILIRUBIN TOTAL: CPT | Performed by: SURGERY

## 2024-06-30 PROCEDURE — 85025 COMPLETE CBC W/AUTO DIFF WBC: CPT | Performed by: SURGERY

## 2024-06-30 RX ORDER — PIPERACILLIN SODIUM, TAZOBACTAM SODIUM 4; .5 G/20ML; G/20ML
4.5 INJECTION, POWDER, LYOPHILIZED, FOR SOLUTION INTRAVENOUS EVERY 6 HOURS
Status: DISCONTINUED | OUTPATIENT
Start: 2024-06-30 | End: 2024-07-04

## 2024-06-30 RX ORDER — METHOCARBAMOL 500 MG/1
500 TABLET, FILM COATED ORAL 4 TIMES DAILY PRN
Status: DISCONTINUED | OUTPATIENT
Start: 2024-06-30 | End: 2024-07-06 | Stop reason: HOSPADM

## 2024-06-30 RX ORDER — PIPERACILLIN SODIUM, TAZOBACTAM SODIUM 4; .5 G/20ML; G/20ML
4.5 INJECTION, POWDER, LYOPHILIZED, FOR SOLUTION INTRAVENOUS ONCE
Status: COMPLETED | OUTPATIENT
Start: 2024-06-30 | End: 2024-06-30

## 2024-06-30 RX ORDER — IOPAMIDOL 755 MG/ML
109 INJECTION, SOLUTION INTRAVASCULAR ONCE
Status: COMPLETED | OUTPATIENT
Start: 2024-06-30 | End: 2024-06-30

## 2024-06-30 RX ORDER — NALOXONE HYDROCHLORIDE 0.4 MG/ML
0.4 INJECTION, SOLUTION INTRAMUSCULAR; INTRAVENOUS; SUBCUTANEOUS
Status: DISCONTINUED | OUTPATIENT
Start: 2024-06-30 | End: 2024-07-06 | Stop reason: HOSPADM

## 2024-06-30 RX ORDER — SODIUM CHLORIDE 9 MG/ML
INJECTION, SOLUTION INTRAVENOUS CONTINUOUS
Status: DISCONTINUED | OUTPATIENT
Start: 2024-06-30 | End: 2024-07-03

## 2024-06-30 RX ORDER — NALOXONE HYDROCHLORIDE 0.4 MG/ML
0.2 INJECTION, SOLUTION INTRAMUSCULAR; INTRAVENOUS; SUBCUTANEOUS
Status: DISCONTINUED | OUTPATIENT
Start: 2024-06-30 | End: 2024-07-06 | Stop reason: HOSPADM

## 2024-06-30 RX ORDER — OXYCODONE HYDROCHLORIDE 5 MG/1
5 TABLET ORAL EVERY 6 HOURS PRN
Status: DISCONTINUED | OUTPATIENT
Start: 2024-06-30 | End: 2024-07-03

## 2024-06-30 RX ORDER — ASPIRIN 81 MG/1
81 TABLET, CHEWABLE ORAL DAILY
Status: DISCONTINUED | OUTPATIENT
Start: 2024-07-01 | End: 2024-07-06 | Stop reason: HOSPADM

## 2024-06-30 RX ORDER — VANCOMYCIN HYDROCHLORIDE 1 G/200ML
1000 INJECTION, SOLUTION INTRAVENOUS EVERY 12 HOURS
Status: DISCONTINUED | OUTPATIENT
Start: 2024-07-01 | End: 2024-07-04

## 2024-06-30 RX ORDER — ACETAMINOPHEN 325 MG/1
650 TABLET ORAL EVERY 6 HOURS PRN
Status: DISCONTINUED | OUTPATIENT
Start: 2024-06-30 | End: 2024-07-06 | Stop reason: HOSPADM

## 2024-06-30 RX ORDER — VALGANCICLOVIR 450 MG/1
450 TABLET, FILM COATED ORAL DAILY
Status: DISCONTINUED | OUTPATIENT
Start: 2024-07-01 | End: 2024-07-06 | Stop reason: HOSPADM

## 2024-06-30 RX ORDER — MAGNESIUM SULFATE HEPTAHYDRATE 40 MG/ML
2 INJECTION, SOLUTION INTRAVENOUS ONCE
Status: COMPLETED | OUTPATIENT
Start: 2024-06-30 | End: 2024-06-30

## 2024-06-30 RX ORDER — PREDNISONE 5 MG/1
5 TABLET ORAL DAILY
Status: DISCONTINUED | OUTPATIENT
Start: 2024-07-01 | End: 2024-07-06 | Stop reason: HOSPADM

## 2024-06-30 RX ORDER — MYCOPHENOLATE MOFETIL 250 MG/1
750 CAPSULE ORAL 2 TIMES DAILY
Status: DISCONTINUED | OUTPATIENT
Start: 2024-06-30 | End: 2024-07-06 | Stop reason: HOSPADM

## 2024-06-30 RX ORDER — SODIUM CHLORIDE, SODIUM LACTATE, POTASSIUM CHLORIDE, CALCIUM CHLORIDE 600; 310; 30; 20 MG/100ML; MG/100ML; MG/100ML; MG/100ML
INJECTION, SOLUTION INTRAVENOUS CONTINUOUS
Status: DISCONTINUED | OUTPATIENT
Start: 2024-06-30 | End: 2024-06-30

## 2024-06-30 RX ORDER — TRAZODONE HYDROCHLORIDE 50 MG/1
50 TABLET, FILM COATED ORAL AT BEDTIME
Status: DISCONTINUED | OUTPATIENT
Start: 2024-06-30 | End: 2024-07-06 | Stop reason: HOSPADM

## 2024-06-30 RX ADMIN — SODIUM CHLORIDE: 9 INJECTION, SOLUTION INTRAVENOUS at 22:53

## 2024-06-30 RX ADMIN — PIPERACILLIN AND TAZOBACTAM 4.5 G: 4; .5 INJECTION, POWDER, LYOPHILIZED, FOR SOLUTION INTRAVENOUS at 12:32

## 2024-06-30 RX ADMIN — MYCOPHENOLATE MOFETIL 750 MG: 250 CAPSULE ORAL at 19:37

## 2024-06-30 RX ADMIN — OXYCODONE HYDROCHLORIDE 5 MG: 5 TABLET ORAL at 23:18

## 2024-06-30 RX ADMIN — SODIUM CHLORIDE, POTASSIUM CHLORIDE, SODIUM LACTATE AND CALCIUM CHLORIDE 1000 ML: 600; 310; 30; 20 INJECTION, SOLUTION INTRAVENOUS at 15:12

## 2024-06-30 RX ADMIN — TACROLIMUS 1.5 MG: 1 CAPSULE ORAL at 18:50

## 2024-06-30 RX ADMIN — IOPAMIDOL 109 ML: 755 INJECTION, SOLUTION INTRAVENOUS at 12:53

## 2024-06-30 RX ADMIN — VANCOMYCIN HYDROCHLORIDE 2000 MG: 1 INJECTION, POWDER, LYOPHILIZED, FOR SOLUTION INTRAVENOUS at 13:16

## 2024-06-30 RX ADMIN — PIPERACILLIN AND TAZOBACTAM 4.5 G: 4; .5 INJECTION, POWDER, LYOPHILIZED, FOR SOLUTION INTRAVENOUS at 18:50

## 2024-06-30 RX ADMIN — TRAZODONE HYDROCHLORIDE 50 MG: 50 TABLET ORAL at 23:17

## 2024-06-30 RX ADMIN — MAGNESIUM SULFATE HEPTAHYDRATE 2 G: 2 INJECTION, SOLUTION INTRAVENOUS at 22:53

## 2024-06-30 ASSESSMENT — ACTIVITIES OF DAILY LIVING (ADL)
ADLS_ACUITY_SCORE: 39
ADLS_ACUITY_SCORE: 38
ADLS_ACUITY_SCORE: 36
ADLS_ACUITY_SCORE: 38
ADLS_ACUITY_SCORE: 30
ADLS_ACUITY_SCORE: 39
ADLS_ACUITY_SCORE: 38
ADLS_ACUITY_SCORE: 39
ADLS_ACUITY_SCORE: 39
ADLS_ACUITY_SCORE: 38
ADLS_ACUITY_SCORE: 30
ADLS_ACUITY_SCORE: 39
ADLS_ACUITY_SCORE: 38

## 2024-06-30 ASSESSMENT — COLUMBIA-SUICIDE SEVERITY RATING SCALE - C-SSRS
1. IN THE PAST MONTH, HAVE YOU WISHED YOU WERE DEAD OR WISHED YOU COULD GO TO SLEEP AND NOT WAKE UP?: NO
2. HAVE YOU ACTUALLY HAD ANY THOUGHTS OF KILLING YOURSELF IN THE PAST MONTH?: NO
6. HAVE YOU EVER DONE ANYTHING, STARTED TO DO ANYTHING, OR PREPARED TO DO ANYTHING TO END YOUR LIFE?: NO

## 2024-06-30 NOTE — ED PROVIDER NOTES
[place] EMERGENCY DEPARTMENT ([facility])    6/30/24       ED PROVIDER NOTE       History     Chief Complaint   Patient presents with    Fever     HPI  Abiola Matute is a 58 year old female with a history of end-stage liver disease with hepatocellular carcinoma s/p liver transplant 6/22/2024 postop day 8, Crohn's disease, HTN, and idiopathic thrombocytopenia presents to the emergency department for fever.    Patient notes that he has been handling most of her pain at home using Tylenol and this has been fairly effective.  She was feeling some soreness throughout her abdomen but no significant fevers, chills, shortness of breath or cough.  Today woke up feeling more unwell and had a fever this morning of 101.  No significant nausea or vomiting.  No diarrhea or constipation.  No dysuria or urinary frequency.  Told by her oncologist to come in for further evaluation.    Her  does note that she has been having a new rash they noted today as well.  This is in both of her elbows and in both of her armpits.  Not particularly painful or uncomfortable but is a little bit uncomfortable with pressure on it.    Past Medical History  Past Medical History:   Diagnosis Date    Alcohol abuse     Alcoholic cirrhosis of liver with ascites     Anal fistula     Atypical ductal hyperplasia of breast 09/10/2010    ERT not recommended -left - and flat epithelial atypia-scheduled for breast biopsy 9/17/2010     Bifid uvula     Cholelithiasis     Contact perianal dermatitis and other eczema     recurrent - clobetasol     Crohn disease     Fear of flying      - gets Ativan prn.     HCC (hepatocellular carcinoma) (H) 2/1/2023    Hypertension     IBS (irritable bowel syndrome)      Past Surgical History:   Procedure Laterality Date    BENCH LIVER  6/22/2024    Procedure: Bench liver;  Surgeon: Pravin Ball MD;  Location: [facility] OR    BIOPSY ANAL N/A 3/28/2019    anal biopsy and culure placement of cathleen - Dr Fleming     BIOPSY BREAST Left 09/17/2010    - scheduled with Dr. Varma     BIOPSY LIVER  2019    COLONOSCOPY  2006    COLONOSCOPY N/A 12/23/2014    Procedure: COMBINED COLONOSCOPY, SINGLE OR MULTIPLE BIOPSY/POLYPECTOMY BY BIOPSY;  Surgeon: Diane Fleming MD;  Location:  GI    COLONOSCOPY N/A 12/5/2019    Procedure: COLONOSCOPY, WITH POLYPECTOMY AND BIOPSY;  Surgeon: Farhan Schilling MD;  Location: [facility] GI    COLONOSCOPY N/A 2/27/2024    Procedure: COLONOSCOPY, WITH POLYPECTOMY AND BIOPSY;  Surgeon: Michelle Diaz MD;  Location: [facility] OR    ENDOSCOPIC RETROGRADE CHOLANGIOPANCREATOGRAM N/A 4/24/2020    Procedure: ENDOSCOPIC RETROGRADE CHOLANGIOPANCREATOGRAPHY WITH, sledge removal,sphincterotomy, stent in gallbladder and pancreatic duct stent, and balloon dilation;  Surgeon: Guru Isac Kraft MD;  Location: [facility] OR    ESOPHAGOSCOPY, GASTROSCOPY, DUODENOSCOPY (EGD), COMBINED N/A 12/5/2019    Procedure: ESOPHAGOGASTRODUODENOSCOPY (EGD);  Surgeon: Farhan Schilling MD;  Location: [facility] GI    ESOPHAGOSCOPY, GASTROSCOPY, DUODENOSCOPY (EGD), COMBINED N/A 5/11/2023    Procedure: Esophagoscopy, gastroscopy, duodenoscopy (EGD), combined;  Surgeon: Jeannette Cervantes MD;  Location: [facility] GI    EXAM UNDER ANESTHESIA ANUS N/A 3/28/2019    Procedure: EXAM UNDER ANESTHESIA ANUS;  Surgeon: Diane Fleming MD;  Location:  OR    EXAM UNDER ANESTHESIA ANUS N/A 2/26/2021    Procedure: EXAM UNDER ANESTHESIA OF ANUS, SETON PLACEMENT, EXCISION OF SKIN BRIDGE;  Surgeon: Avtar Nam MD;  [place] [facility]    EXAM UNDER ANESTHESIA ANUS N/A 1/6/2023    Procedure: EXAM UNDER ANESTHESIA, ANUS, SETON EXCHANGE, partial fistulotomy;  Surgeon: Mary Dugan MD;  [place] [facility]    HYSTERECTOMY, VAGINAL  2006    with Dr. Licha Zhou - with BSO for fibroids     IR GALLBLADDER DRAIN PLACEMENT  4/22/2020    IR SIRT (SELECTIVE INTERNAL RADIO THERAPY)  2/21/2023    IR VISCERAL ANGIOGRAM  2/21/2023     IR VISCERAL EMBOLIZATION  2023    LAPAROSCOPIC ABLATION LIVER TUMOR N/A 2023    Procedure: Diagnostic Laparoscopy, Laparoscopic microwave ablation of liver tumor intraoperative ultrasound of liver, lysis of adhesions 1 hour,;  Surgeon: Albino Saavedra MD;  Location: [facility] OR    OPEN REDUCTION INTERNAL FIXATION ANKLE Left at age 27    plates and screws removed at age 36    Pelviscopy with removal of bilateral hydrosalpinges.  04/15/2010    TRANSPLANT LIVER RECIPIENT  DONOR N/A 2024    Procedure: Transplant liver recipient  donor, with biliary stent placement;  Surgeon: Pravin Ball MD;  Location: [facility] OR    ZZC APPENDECTOMY  at age 14     acetaminophen (TYLENOL) 325 MG tablet  ammonium lactate (AMLACTIN) 12 % external cream  aspirin (ASA) 81 MG chewable tablet  clobetasol (TEMOVATE) 0.05 % external cream  COMPOUND CONTAINING CONTROLLED SUBSTANCE (CMPD RX) - PHARMACY TO MIX COMPOUNDED MEDICATION  dapsone (ACZONE) 25 MG tablet  inFLIXimab (REMICADE) 100 MG injection  magnesium oxide (MAG-OX) 400 MG tablet  methocarbamol (ROBAXIN) 500 MG tablet  mycophenolate (GENERIC EQUIVALENT) 250 MG capsule  oxyCODONE (ROXICODONE) 5 MG tablet  pantoprazole (PROTONIX) 40 MG EC tablet  polyethylene glycol (MIRALAX) 17 GM/Dose powder  predniSONE (DELTASONE) 5 MG tablet  senna-docusate (SENOKOT-S/PERICOLACE) 8.6-50 MG tablet  tacrolimus (GENERIC EQUIVALENT) 0.5 MG capsule  tacrolimus (GENERIC EQUIVALENT) 1 MG capsule  traZODone (DESYREL) 50 MG tablet  triamcinolone (KENALOG) 0.1 % external cream  ursodiol (ACTIGALL) 300 MG capsule  valGANciclovir (VALCYTE) 450 MG tablet      Allergies   Allergen Reactions    Fish Oil      Redness and itching around eye area only - went away when fish oil capsules stopped     Metronidazole      pain/itching    Ppd [Tuberculin Purified Protein Derivative]     Sulfa Antibiotics      hives    Terbinafine And Related      Lamisil = mild urticarial reaction     Family  History  Family History   Problem Relation Age of Onset    Breast Cancer Mother     Gastrointestinal Disease Mother     Heart Disease Father 57    Hypertension Maternal Grandmother     Substance Abuse Maternal Grandfather     Diabetes Maternal Grandfather     Diabetes Paternal Grandmother     Heart Disease Paternal Grandfather     Cancer Sister     Skin Cancer Sister     Substance Abuse Maternal Uncle     Substance Abuse Maternal Uncle     Suicide Niece     Suicide Niece     Anesthesia Reaction No family hx of     Thrombosis No family hx of      Social History   Social History     Tobacco Use    Smoking status: Never     Passive exposure: Never    Smokeless tobacco: Never   Vaping Use    Vaping status: Never Used   Substance Use Topics    Alcohol use: Not Currently    Drug use: No      A medically appropriate review of systems was performed with pertinent positives and negatives noted in the HPI, and all other systems negative.    Physical Exam   BP: 94/62  Pulse: 117  Temp: 98.9  F (37.2  C)  Resp: 18  SpO2: 95 %  Physical Exam  GEN: Resting, relatively well-appearing  HEENT: normocephalic and atraumatic, PERRLA, EOMI,  CV: well-perfused, normal skin color for ethnicity, regular rate and rhythm  PULM: breathing comfortably, in no respiratory distress, clear to auscultation upper and lower lung fields  ABD: nondistended, midline incision which is relatively well-appearing and healing well although there is some mild erythema overlying the far right lateral aspect of her incision without any surrounding drainage, purulence, or significant tenderness to palpation, nonperitonitic  EXT: Full range of motion.  No edema.  NEURO: awake, conversant, grossly normal bilateral upper and lower extremity strength & ROM   SKIN: Erythematous patches on both of her antecubital fossa's and in her axilla bilaterally, bilateral lower abdominal erythema without any overlying tenderness or warmth  PSYCH: Calm and cooperative,  interactive     ED Course, Procedures, & Data      Procedures            Results for orders placed or performed during the hospital encounter of 06/30/24   XR Chest 2 Views     Status: None    Narrative    EXAM: XR CHEST 2 VIEWS  6/30/2024 11:24 AM     HISTORY:  Fever       COMPARISON:  Chest radiograph 6/23/2024, chest CT 6/20/2024    FINDINGS:   Asymmetrically elevated right hemidiaphragm. Trachea is midline.  Cardiac silhouette is within normal limits. Pulmonary vasculature is  distinct. Streaky right perihilar/basilar opacities. Trace bilateral  pleural effusions. No pneumothorax. Upper abdominal surgical clips.         Impression    IMPRESSION:   1. Asymmetrically elevated right hemidiaphragm, increased compared to  prior exam with associated streaky right perihilar/basilar opacities,  favored to represent atelectasis.  2. Trace bilateral pleural effusions.    I have personally reviewed the examination and initial interpretation  and I agree with the findings.    DEBBY ORTEGA DO         SYSTEM ID:  [id number]   CT Abdomen Pelvis w Contrast     Status: None (Preliminary result)    Impression    RESIDENT PRELIMINARY INTERPRETATION  IMPRESSION:  1. Small to moderate right pleural effusion with overlying atelectasis  and adjacent groundglass density. Cannot exclude a superimposed  pneumonia.  2. Post surgical changes of liver transplant with reabsorbing hematoma  medial to the right hemiliver.  3. Small volume ascites.   Lactic Acid Whole Blood w/ 1x repeat in 2 hrs when >2     Status: Normal   Result Value Ref Range    Lactic Acid, Initial 1.2 0.7 - 2.0 mmol/L   Comprehensive metabolic panel     Status: Abnormal   Result Value Ref Range    Sodium 128 (L) 135 - 145 mmol/L    Potassium 4.5 3.4 - 5.3 mmol/L    Carbon Dioxide (CO2) 21 (L) 22 - 29 mmol/L    Anion Gap 10 7 - 15 mmol/L    Urea Nitrogen 10.5 8.0 - 23.0 mg/dL    Creatinine 0.70 0.51 - 0.95 mg/dL    GFR Estimate >90 >60 mL/min/1.73m2    Calcium 8.2 (L) 8.6  - 10.0 mg/dL    Chloride 97 (L) 98 - 107 mmol/L    Glucose 148 (H) 70 - 99 mg/dL    Alkaline Phosphatase 140 40 - 150 U/L    AST 25 0 - 45 U/L     (H) 0 - 50 U/L    Protein Total 5.7 (L) 6.4 - 8.3 g/dL    Albumin 3.1 (L) 3.5 - 5.2 g/dL    Bilirubin Total 1.8 (H) <=1.2 mg/dL   Blood gas venous     Status: Abnormal   Result Value Ref Range    pH Venous 7.02 (LL) 7.32 - 7.43    pCO2 Venous 63 (H) 40 - 50 mm Hg    pO2 Venous 100 (H) 25 - 47 mm Hg    Bicarbonate Venous 16 (L) 21 - 28 mmol/L    Base Excess/Deficit Venous -14.6 (L) -3.0 - 3.0 mmol/L    FIO2 21     Oxyhemoglobin Venous 91 (H) 70 - 75 %    O2 Sat, Venous 94.6 (H) 70.0 - 75.0 %    Narrative    In healthy individuals, oxyhemoglobin (O2Hb) and oxygen saturation (SO2) are approximately equal. In the presence of dyshemoglobins, oxyhemoglobin can be considerably lower than oxygen saturation.   Lipase     Status: Normal   Result Value Ref Range    Lipase 24 13 - 60 U/L   Procalcitonin     Status: Abnormal   Result Value Ref Range    Procalcitonin 0.71 (H) <0.50 ng/mL   UA with Microscopic     Status: Abnormal   Result Value Ref Range    Color Urine Yellow Colorless, Straw, Light Yellow, Yellow    Appearance Urine Clear Clear    Glucose Urine Negative Negative mg/dL    Bilirubin Urine Negative Negative    Ketones Urine Negative Negative mg/dL    Specific Gravity Urine 1.018 1.003 - 1.035    Blood Urine Negative Negative    pH Urine 8.0 (H) 5.0 - 7.0    Protein Albumin Urine 10 (A) Negative mg/dL    Urobilinogen Urine Normal Normal, 2.0 mg/dL    Nitrite Urine Negative Negative    Leukocyte Esterase Urine Small (A) Negative    RBC Urine <1 <=2 /HPF    WBC Urine 4 <=5 /HPF    Squamous Epithelials Urine 4 (H) <=1 /HPF   CBC with platelets and differential     Status: Abnormal   Result Value Ref Range    WBC Count 26.0 (H) 4.0 - 11.0 10e3/uL    RBC Count 2.71 (L) 3.80 - 5.20 10e6/uL    Hemoglobin 8.9 (L) 11.7 - 15.7 g/dL    Hematocrit 26.7 (L) 35.0 - 47.0 %    MCV  99 78 - 100 fL    MCH 32.8 26.5 - 33.0 pg    MCHC 33.3 31.5 - 36.5 g/dL    RDW 18.1 (H) 10.0 - 15.0 %    Platelet Count 171 150 - 450 10e3/uL    % Neutrophils 91 %    % Lymphocytes 1 %    % Monocytes 1 %    % Eosinophils 3 %    % Basophils 0 %    % Immature Granulocytes 4 %    NRBCs per 100 WBC 0 <1 /100    Absolute Neutrophils 23.6 (H) 1.6 - 8.3 10e3/uL    Absolute Lymphocytes 0.2 (L) 0.8 - 5.3 10e3/uL    Absolute Monocytes 0.4 0.0 - 1.3 10e3/uL    Absolute Eosinophils 0.7 0.0 - 0.7 10e3/uL    Absolute Basophils 0.0 0.0 - 0.2 10e3/uL    Absolute Immature Granulocytes 1.1 (H) <=0.4 10e3/uL    Absolute NRBCs 0.0 10e3/uL   [place] Draw     Status: None    Narrative    The following orders were created for panel order [place] Draw.  Procedure                               Abnormality         Status                     ---------                               -----------         ------                     Extra Blue Top Tube[[id number]]                              Final result                 Please view results for these tests on the individual orders.   Extra Blue Top Tube     Status: None   Result Value Ref Range    Hold Specimen JI    iStat Gases (lactate) venous, POCT     Status: Abnormal   Result Value Ref Range    Lactic Acid POCT 1.1 <=2.0 mmol/L    Bicarbonate Venous POCT 18 (L) 21 - 28 mmol/L    O2 Sat, Venous POCT 89 (H) 70 - 75 %    pCO2 Venous POCT 68 (H) 40 - 50 mm Hg    pH Venous POCT 7.03 (LL) 7.32 - 7.43    pO2 Venous POCT 83 (H) 25 - 47 mm Hg    Base Excess/Deficit (+/-) POCT -13.0 (L) -3.0 - 3.0 mmol/L   RBC and Platelet Morphology     Status: Abnormal   Result Value Ref Range    Platelet Assessment  Automated Count Confirmed. Platelet morphology is normal.     Automated Count Confirmed. Platelet morphology is normal.    Acanthocytes      Eliazar Rods      Basophilic Stippling      Bite Cells      Blister Cells      Filemon Cells      Elliptocytes      Hgb C Crystals      Whiting-Jolly Bodies       Hypersegmented Neutrophils      Polychromasia Slight (A) None Seen    RBC agglutination      RBC Fragments      Reactive Lymphocytes      Rouleaux      Sickle Cells      Smudge Cells      Spherocytes      Stomatocytes      Target Cells      Teardrop Cells      Toxic Neutrophils      RBC Morphology Confirmed RBC Indices    CBC with platelets differential     Status: Abnormal    Narrative    The following orders were created for panel order CBC with platelets differential.  Procedure                               Abnormality         Status                     ---------                               -----------         ------                     CBC with platelets and d...[[id number]]  Abnormal            Final result               RBC and Platelet Morphology[[id number]]  Abnormal            Final result                 Please view results for these tests on the individual orders.     Medications   sodium chloride (PF) 0.9% PF flush 3 mL (has no administration in time range)   sodium chloride (PF) 0.9% PF flush 3 mL (3 mLs Intracatheter $Given 6/30/24 1235)   lactated ringers BOLUS 1,000 mL (has no administration in time range)   vancomycin (VANCOCIN) 2,000 mg in sodium chloride 0.9 % 500 mL intermittent infusion (2,000 mg Intravenous $New Bag 6/30/24 1316)   vancomycin (VANCOCIN) 1,000 mg in 200 mL dextrose intermittent infusion (has no administration in time range)   piperacillin-tazobactam (ZOSYN) 4.5 g vial to attach to  mL bag (has no administration in time range)   piperacillin-tazobactam (ZOSYN) 4.5 g vial to attach to  mL bag (0 g Intravenous Stopped 6/30/24 1320)   iopamidol (ISOVUE-370) solution 109 mL (109 mLs Intravenous $Given 6/30/24 1253)   sodium chloride (PF) 0.9% PF flush 77 mL (77 mLs Intracatheter $Given 6/30/24 1252)     Labs Ordered and Resulted from Time of ED Arrival to Time of ED Departure   COMPREHENSIVE METABOLIC PANEL - Abnormal       Result Value    Sodium 128 (*)     Potassium  4.5      Carbon Dioxide (CO2) 21 (*)     Anion Gap 10      Urea Nitrogen 10.5      Creatinine 0.70      GFR Estimate >90      Calcium 8.2 (*)     Chloride 97 (*)     Glucose 148 (*)     Alkaline Phosphatase 140      AST 25       (*)     Protein Total 5.7 (*)     Albumin 3.1 (*)     Bilirubin Total 1.8 (*)    BLOOD GAS VENOUS - Abnormal    pH Venous 7.02 (*)     pCO2 Venous 63 (*)     pO2 Venous 100 (*)     Bicarbonate Venous 16 (*)     Base Excess/Deficit Venous -14.6 (*)     FIO2 21      Oxyhemoglobin Venous 91 (*)     O2 Sat, Venous 94.6 (*)    PROCALCITONIN - Abnormal    Procalcitonin 0.71 (*)    ROUTINE UA WITH MICROSCOPIC - Abnormal    Color Urine Yellow      Appearance Urine Clear      Glucose Urine Negative      Bilirubin Urine Negative      Ketones Urine Negative      Specific Gravity Urine 1.018      Blood Urine Negative      pH Urine 8.0 (*)     Protein Albumin Urine 10 (*)     Urobilinogen Urine Normal      Nitrite Urine Negative      Leukocyte Esterase Urine Small (*)     RBC Urine <1      WBC Urine 4      Squamous Epithelials Urine 4 (*)    CBC WITH PLATELETS AND DIFFERENTIAL - Abnormal    WBC Count 26.0 (*)     RBC Count 2.71 (*)     Hemoglobin 8.9 (*)     Hematocrit 26.7 (*)     MCV 99      MCH 32.8      MCHC 33.3      RDW 18.1 (*)     Platelet Count 171      % Neutrophils 91      % Lymphocytes 1      % Monocytes 1      % Eosinophils 3      % Basophils 0      % Immature Granulocytes 4      NRBCs per 100 WBC 0      Absolute Neutrophils 23.6 (*)     Absolute Lymphocytes 0.2 (*)     Absolute Monocytes 0.4      Absolute Eosinophils 0.7      Absolute Basophils 0.0      Absolute Immature Granulocytes 1.1 (*)     Absolute NRBCs 0.0     ISTAT GASES LACTATE VENOUS POCT - Abnormal    Lactic Acid POCT 1.1      Bicarbonate Venous POCT 18 (*)     O2 Sat, Venous POCT 89 (*)     pCO2 Venous POCT 68 (*)     pH Venous POCT 7.03 (*)     pO2 Venous POCT 83 (*)     Base Excess/Deficit (+/-) POCT -13.0 (*)    RBC  AND PLATELET MORPHOLOGY - Abnormal    Platelet Assessment        Value: Automated Count Confirmed. Platelet morphology is normal.    Acanthocytes        Eliazar Rods        Basophilic Stippling        Bite Cells        Blister Cells        Filemon Cells        Elliptocytes        Hgb C Crystals        Whiting-Jolly Bodies        Hypersegmented Neutrophils        Polychromasia Slight (*)     RBC agglutination        RBC Fragments        Reactive Lymphocytes        Rouleaux        Sickle Cells        Smudge Cells        Spherocytes        Stomatocytes        Target Cells        Teardrop Cells        Toxic Neutrophils        RBC Morphology Confirmed RBC Indices     LACTIC ACID WHOLE BLOOD WITH 1X REPEAT IN 2 HR WHEN >2 - Normal    Lactic Acid, Initial 1.2     LIPASE - Normal    Lipase 24     BLOOD GAS ARTERIAL   BLOOD CULTURE   [place] VIRUS QUANTITATIVE PCR, PLASMA   CMV QUANTITATIVE, PCR   BLOOD CULTURE   C. DIFFICILE TOXIN B PCR WITH REFLEX TO C. DIFFICILE ANTIGEN AND TOXINS A/B EIA     CT Abdomen Pelvis w Contrast   Preliminary Result   RESIDENT PRELIMINARY INTERPRETATION   IMPRESSION:   1. Small to moderate right pleural effusion with overlying atelectasis   and adjacent groundglass density. Cannot exclude a superimposed   pneumonia.   2. Post surgical changes of liver transplant with reabsorbing hematoma   medial to the right hemiliver.   3. Small volume ascites.      XR Chest 2 Views   Final Result   IMPRESSION:    1. Asymmetrically elevated right hemidiaphragm, increased compared to   prior exam with associated streaky right perihilar/basilar opacities,   favored to represent atelectasis.   2. Trace bilateral pleural effusions.      I have personally reviewed the examination and initial interpretation   and I agree with the findings.      DEBBY ORTEGA DO            SYSTEM ID:  [id number]             Critical Care Addendum  My initial assessment, based on my review of prehospital provider report, review of  nursing observations, review of vital signs, focused history, physical exam, review of cardiac rhythm monitor, and discussion with transplant , established a high suspicion that Abiola Matute has sepsis with indication for early goal-directed therapy, which requires immediate intervention, and therefore she is critically ill.     After the initial assessment, the care team initiated multiple lab tests, initiated IV fluid administration, initiated medication therapy with see note, and consulted with see note  to provide stabilization care. Due to the critical nature of this patient, I reassessed nursing observations, vital signs, physical exam, review of cardiac rhythm monitor, mental status, and respiratory status multiple times prior to her disposition.     Time also spent performing documentation, discussion with family to obtain medical information for decision making, reviewing test results, discussion with consultants, and coordination of care.     Critical care time (excluding teaching time and procedures): 50 minutes.       Assessment & Plan    58-year-old female status post liver transplant 8 days ago due to end-stage liver disease with hepatocellular carcinoma, Crohn's disease and idiopathic thrombocytopenia presenting to the emergency department due to fever that began this morning noted to be relatively well-appearing although with some erythema and Rashes in bilateral flexor surfaces of her axilla and antecubital fossa and some erythema overlying the lateral aspect of her abdomen and lower lateral aspect of her right abdominal incision.    Differential for her was broad due to her recent surgery, and decision was made to empirically antibiosis with broad-spectrum antibiotics including vancomycin and Zosyn.    Initial lab work demonstrated pH of 7.02 with an elevated CO2 of 63, and bicarb of 16, sodium of 128, and procalcitonin of 0.71.  White blood cell count of 26.  Urinalysis negative.  Chest x-ray  demonstrates some atelectasis but no significant focal pneumonia.    Although her pH is significantly low and there are some respiratory acidosis, it looks like a combination of both metabolic and respiratory acidosis as well.  She is not having any respiratory distress, is awake, oriented and is not particularly shallow breathing although she endorses that she has not been taking the brace breast is normal.    Despite having fairly significant acidosis, she is relatively well-appearing.  I did speak with transplant surgery who recommended obtaining an ABG to evaluate whether or not this is true, and would consider starting her on BiPAP if she does have a persistent acidosis from with a respiratory component.  CT of the abdomen obtained which demonstrates evidence of a small to moderate right pleural effusion which may be contributing at least partially to her acid-base status.  Small volume ascites in the abdomen but no other significant complications noted on preliminary CT.  Will admit to the trauma surgery service, however pending ABG to determine level of care    ABG reassuring without any evidence of severe acidosis as previously noted and a VBG.  Still awaiting disposition from transplant surgery to either the floor versus less likely the ICU    I have reviewed the nursing notes. I have reviewed the findings, diagnosis, plan and need for follow up with the patient.    New Prescriptions    No medications on file       Final diagnoses:   Fever, unspecified fever cause   Liver replaced by transplant (H)   Pneumonia of right lung due to infectious organism, unspecified part of lung   Pleural effusion on right   Hyponatremia       Patricia Rowe MD  [facility] EMERGENCY DEPARTMENT  6/30/2024     Soledad Gomez MD  06/30/24 [id number]

## 2024-06-30 NOTE — PHARMACY-VANCOMYCIN DOSING SERVICE
Pharmacy Vancomycin Initial Note  Date of Service 2024  Patient's  1965  58 year old, female    Indication: Intra-abdominal infection and Sepsis    Current estimated CrCl = Estimated Creatinine Clearance: 91.3 mL/min (based on SCr of 0.7 mg/dL).    Creatinine for last 3 days  2024:  6:43 AM Creatinine 0.88 mg/dL  2024: 11:11 AM Creatinine 0.70 mg/dL    Recent Vancomycin Level(s) for last 3 days  No results found for requested labs within last 3 days.      Vancomycin IV Administrations (past 72 hours)        No vancomycin orders with administrations in past 72 hours.                    Nephrotoxins and other renal medications (From now, onward)      Start     Dose/Rate Route Frequency Ordered Stop    24 0130  vancomycin (VANCOCIN) 1,000 mg in 200 mL dextrose intermittent infusion         1,000 mg  200 mL/hr over 1 Hours Intravenous EVERY 12 HOURS 24 1315      24 1130  vancomycin (VANCOCIN) 2,000 mg in sodium chloride 0.9 % 500 mL intermittent infusion         2,000 mg  over 120 Minutes Intravenous ONCE 24 1105              Contrast Orders - past 72 hours (72h ago, onward)      Start     Dose/Rate Route Frequency Stop    24 1240  iopamidol (ISOVUE-370) solution 109 mL         109 mL Intravenous ONCE 24 1253            [employer] Prediction of Planned Initial Vancomycin Regimen  Loading dose: 2000 mg at 13:30 2024.  Regimen: 1000 mg IV every 12 hours.  Start time: 13:30 on 2024  Exposure target: AUC24 (range)400-600 mg/L.hr   AUC24,ss: 473 mg/L.hr  Probability of AUC24 > 400: 67 %  Ctrough,ss: 14.7 mg/L  Probability of Ctrough,ss > 20: 26 %  Probability of nephrotoxicity (Lodise JAIME ): 10 %          Plan:  Give vancomycin 2000mg IV x1 now, then start vancomycin 1000mg IV q12 hours  Vancomycin monitoring method: AUC  Vancomycin therapeutic monitoring goal: 400-600 mg*h/L  Pharmacy will check vancomycin levels as appropriate in 1-3 Days.     Serum creatinine levels will be ordered daily for the first week of therapy and at least twice weekly for subsequent weeks.      Sheldon Blake, PharmD, BCPS

## 2024-06-30 NOTE — MEDICATION SCRIBE - ADMISSION MEDICATION HISTORY
Medication Scribe Admission Medication History    Admission medication history is complete. The information provided in this note is only as accurate as the sources available at the time of the update.    Information Source(s): Facility ([facility]/) medication list/MAR via in-person    Pertinent Information:   Patient has a list of medications from a hospital discharge on 6/28/2024. Patient's  reported the medications on the list were the ones patient is currently taking. Medications listed below were are not on patient's current medication list but were on the PTA list and have been deleted from that list   triamcinolone (KENALOG) 0.1 % external cream     inFLIXimab (REMICADE) 100 MG injection    COMPOUND CONTAINING CONTROLLED SUBSTANCE (CMPD RX) - PHARMACY TO MIX COMPOUNDED MEDICATION    clobetasol (TEMOVATE) 0.05 % external cream     ammonium lactate (AMLACTIN) 12 % external cream       Patient prescribed  tacrolimus (GENERIC EQUIVALENT) 0.5 MG capsule     Take 1 capsule (0.5 mg) by mouth 2 times daily Total discharge dose = 1.5 mg BID,   and  tacrolimus (GENERIC EQUIVALENT) 1 MG capsule     Take 1 capsule (1 mg) by mouth 2 times daily Total discharge dose = 1.5 mg BID,  medication manufactured by [employer] this was confirmed by pharmacist at [facility] discharge pharmacy      Changes made to PTA medication list:  Added: None  Deleted: triamcinolone (KENALOG) 0.1 % external cream     Apply to AA BID x 1-2 week then PRN only   inFLIXimab (REMICADE) 100 MG injection  HOLD FOR NOW AND DISCUSS RESTARTING WITH RECURRENCE OF SYMPTOMS.,    COMPOUND CONTAINING CONTROLLED SUBSTANCE (CMPD RX) - PHARMACY TO MIX COMPOUNDED MEDICATION  PLEASE HOLD FOR NOW WHILE RECOVERING FROM LIVER TRANSPLANT.,   clobetasol (TEMOVATE) 0.05 % external cream     Apply to AA BID x 1-2 weeks then PRN. Do not apply to face  ammonium lactate (AMLACTIN) 12 % external cream     Apply topically 2 times daily ace.     Changed:  None    Allergies reviewed with patient and updates made in EHR: yes    Medication History Completed By: Paulette Patterson 6/30/2024 4:14 PM    Prior to Admission medications    Medication Sig Last Dose Taking? Auth Provider Long Term End Date   acetaminophen (TYLENOL) 325 MG tablet Take 2 tablets (650 mg) by mouth every 6 hours as needed for mild pain Unknown at as neded Yes Philly Webb NP     aspirin (ASA) 81 MG chewable tablet 1 tablet (81 mg) by Oral or Feeding Tube route daily 6/30/2024 at 8 am Yes Philly Webb NP     dapsone (ACZONE) 25 MG tablet 2 tablets (50 mg) by Oral or Feeding Tube route daily 6/30/2024 at 8 am Yes Philly Webb NP     magnesium oxide (MAG-OX) 400 MG tablet Take 1 tablet (400 mg) by mouth daily 6/29/2024 at 12 pm Yes Philly Webb NP     methocarbamol (ROBAXIN) 500 MG tablet Take 1 tablet (500 mg) by mouth 4 times daily as needed for muscle spasms Unknown at as needed Yes Philly Webb NP     mycophenolate (GENERIC EQUIVALENT) 250 MG capsule Take 3 capsules (750 mg) by mouth 2 times daily 6/30/2024 at 8 am Yes Philly Webb NP Yes    oxyCODONE (ROXICODONE) 5 MG tablet 1 tablet (5 mg) by Oral or Feeding Tube route every 8 hours as needed for moderate pain Unknown at as needed Yes Philly Webb NP     pantoprazole (PROTONIX) 40 MG EC tablet Take 1 tablet (40 mg) by mouth daily 6/30/2024 at 8 am Yes Philly Webb NP     polyethylene glycol (MIRALAX) 17 GM/Dose powder Take 17 g by mouth 2 times daily as needed for constipation Unknown at as neede Yes Philly Webb NP     predniSONE (DELTASONE) 5 MG tablet 1 tablet (5 mg) by Oral or Feeding Tube route daily 6/30/2024 at 8 am Yes Philly Webb NP     senna-docusate (SENOKOT-S/PERICOLACE) 8.6-50 MG tablet 1-2 tablets by Oral or Feeding Tube route 2 times daily as needed for constipation Unknown at as needed Yes Philly Webb, NP     tacrolimus (GENERIC  EQUIVALENT) 0.5 MG capsule Take 1 capsule (0.5 mg) by mouth 2 times daily Total discharge dose = 1.5 mg BID 6/30/2024 at 8am Yes Philly Webb NP     tacrolimus (GENERIC EQUIVALENT) 1 MG capsule Take 1 capsule (1 mg) by mouth 2 times daily Total discharge dose = 1.5 mg BID 6/30/2024 at 8am Yes Philly Webb NP     traZODone (DESYREL) 50 MG tablet Take 0.5-1 tablets (25-50 mg) by mouth nightly as needed for sleep Unknown at as needed Yes Philly Webb NP Yes    ursodiol (ACTIGALL) 300 MG capsule 1 capsule (300 mg) by Oral or Feeding Tube route 2 times daily 6/30/2024 at 8 am Yes Philly Webb NP No    valGANciclovir (VALCYTE) 450 MG tablet Take 1 tablet (450 mg) by mouth daily 6/30/2024 at 8 am Yes Nomi Bal MD Yes

## 2024-06-30 NOTE — PROGRESS NOTES
"Paged to place a PIV in patient but patient did not want \"another poke\".  was in the room and stated \" It  usually takes multiple attempts to put an IV in and they have already tried multiple time\". Charge RN Lacy made aware.  "

## 2024-06-30 NOTE — H&P
[facility]    History and Physical  Transplant Surgery     Date of Admission:  2024    Assessment & Plan   Abiola Matute is a 58 year old female with a history of  cirrhosis 2/2 DUNN/EtOH complicated by HCC and ascites s/p DCD  donor liver transplant w/ biliary stent placement on 24  Additional history includes osteopenia, LE cellulitis, crohn's disease on infliximab, and obesity. She discharged on  and re-presented to ED today with a fever >101F and new patchy induration on RUE, axilla, and flank that appeared last night. Labs were remarkable for WBC 26 and pH 7.03 despite appearing stable on exam. Remainder of ROS negative. CT shows pleural effusion with possible pneumonia, but no clinical symptoms.     -Admit to SICU due to concern for sepsis, possible secondary to cellulitis  -Continue Vanco & Zosyn started in ED for now  -Home immunosuppression restarted. Will discuss possible reduction with fellow.  -LFTs look good, check daily  -Check AM tacrolimus level every MWF  -Requested ABG from ED  -Blood cultures pending, UA WBC 4  -Hyponatremia, unclear etiology. Not volume overloaded and has been eating and drinking without issue.      Miguelina Weinstein, APRN CNP      Chief Complaint   Fever    History of Present Illness   Abiola Matute is a 58 year old female with a history of  cirrhosis 2/2 DUNN/EtOH complicated by HCC and ascites s/p DCD  donor liver transplant w/ biliary stent placement on 24  Additional history includes osteopenia, LE cellulitis, crohn's disease on infliximab, and obesity. She discharged on  and re-presented to ED today with a fever >101F and new patchy induration on RUE, axilla, and flank that appeared last night. Labs were remarkable for WBC 26 and pH 7.03 despite appearing stable on exam. Remainder of ROS negative.     Clinically Significant Risk Factors Present on Admission         # Hyponatremia:  Lowest Na = 128 mmol/L in last 2 days, will monitor as appropriate      # Hypoalbuminemia: Lowest albumin = 3.1 g/dL at 6/30/2024 11:11 AM, will monitor as appropriate   # Drug Induced Platelet Defect: home medication list includes an antiplatelet medication   # Hypertension: Noted on problem list   # Acute Respiratory Failure: based on blood gas results.  Continue supplemental oxygen as needed   # Anemia: based on hgb <11                         Past Medical History    I have reviewed this patient's medical history and updated it with pertinent information if needed.   Past Medical History:   Diagnosis Date    Alcohol abuse     Alcoholic cirrhosis of liver with ascites     Anal fistula     Atypical ductal hyperplasia of breast 09/10/2010    ERT not recommended -left - and flat epithelial atypia-scheduled for breast biopsy 9/17/2010     Bifid uvula     Cholelithiasis     Contact perianal dermatitis and other eczema     recurrent - clobetasol     Crohn disease     Fear of flying      - gets Ativan prn.     HCC (hepatocellular carcinoma) (H) 2/1/2023    Hypertension     IBS (irritable bowel syndrome)        Past Surgical History   I have reviewed this patient's surgical history and updated it with pertinent information if needed.  Past Surgical History:   Procedure Laterality Date    BENCH LIVER  6/22/2024    Procedure: Bench liver;  Surgeon: Pravin Ball MD;  Location: [facility] OR    BIOPSY ANAL N/A 3/28/2019    anal biopsy and culure placement of seton - Dr Fleming    BIOPSY BREAST Left 09/17/2010    - scheduled with Dr. Varma     BIOPSY LIVER  2019    COLONOSCOPY  2006    COLONOSCOPY N/A 12/23/2014    Procedure: COMBINED COLONOSCOPY, SINGLE OR MULTIPLE BIOPSY/POLYPECTOMY BY BIOPSY;  Surgeon: Diane Fleming MD;  Location:  GI    COLONOSCOPY N/A 12/5/2019    Procedure: COLONOSCOPY, WITH POLYPECTOMY AND BIOPSY;  Surgeon: Farhan Schilling MD;  Location:  GI    COLONOSCOPY N/A 2/27/2024     Procedure: COLONOSCOPY, WITH POLYPECTOMY AND BIOPSY;  Surgeon: Michelle Diaz MD;  Location: [facility] OR    ENDOSCOPIC RETROGRADE CHOLANGIOPANCREATOGRAM N/A 4/24/2020    Procedure: ENDOSCOPIC RETROGRADE CHOLANGIOPANCREATOGRAPHY WITH, sledge removal,sphincterotomy, stent in gallbladder and pancreatic duct stent, and balloon dilation;  Surgeon: Guru Isac Kraft MD;  Location: [facility] OR    ESOPHAGOSCOPY, GASTROSCOPY, DUODENOSCOPY (EGD), COMBINED N/A 12/5/2019    Procedure: ESOPHAGOGASTRODUODENOSCOPY (EGD);  Surgeon: Farhan Schilling MD;  Location: [facility] GI    ESOPHAGOSCOPY, GASTROSCOPY, DUODENOSCOPY (EGD), COMBINED N/A 5/11/2023    Procedure: Esophagoscopy, gastroscopy, duodenoscopy (EGD), combined;  Surgeon: Jeannette Cervantes MD;  Location: [facility] GI    EXAM UNDER ANESTHESIA ANUS N/A 3/28/2019    Procedure: EXAM UNDER ANESTHESIA ANUS;  Surgeon: Diane Fleming MD;  Location:  OR    EXAM UNDER ANESTHESIA ANUS N/A 2/26/2021    Procedure: EXAM UNDER ANESTHESIA OF ANUS, SETON PLACEMENT, EXCISION OF SKIN BRIDGE;  Surgeon: Avtar Nam MD;  Location: [facility] OR    EXAM UNDER ANESTHESIA ANUS N/A 1/6/2023    Procedure: EXAM UNDER ANESTHESIA, ANUS, SETON EXCHANGE, partial fistulotomy;  Surgeon: Mary Dugan MD;  Location: [facility] OR    HYSTERECTOMY, VAGINAL  2006    with Dr. Licha Zhou - with BSO for fibroids     IR GALLBLADDER DRAIN PLACEMENT  4/22/2020    IR SIRT (SELECTIVE INTERNAL RADIO THERAPY)  2/21/2023    IR VISCERAL ANGIOGRAM  2/21/2023    IR VISCERAL EMBOLIZATION  2/27/2023    LAPAROSCOPIC ABLATION LIVER TUMOR N/A 8/29/2023    Procedure: Diagnostic Laparoscopy, Laparoscopic microwave ablation of liver tumor intraoperative ultrasound of liver, lysis of adhesions 1 hour,;  Surgeon: Albino Saavedra MD;  Location: [facility] OR    OPEN REDUCTION INTERNAL FIXATION ANKLE Left at age 27    plates and screws removed at age 36    Pelviscopy with removal of bilateral hydrosalpinges.   04/15/2010    TRANSPLANT LIVER RECIPIENT  DONOR N/A 2024    Procedure: Transplant liver recipient  donor, with biliary stent placement;  Surgeon: Pravin Ball MD;  Location:  OR    [facility] APPENDECTOMY  at age 14       Prior to Admission Medications   Prior to Admission Medications   Prescriptions Last Dose Informant Patient Reported? Taking?   COMPOUND CONTAINING CONTROLLED SUBSTANCE (CMPD RX) - PHARMACY TO MIX COMPOUNDED MEDICATION   No No   Sig: PLEASE HOLD FOR NOW WHILE RECOVERING FROM LIVER TRANSPLANT.   acetaminophen (TYLENOL) 325 MG tablet   No No   Sig: Take 2 tablets (650 mg) by mouth every 6 hours as needed for mild pain   ammonium lactate (AMLACTIN) 12 % external cream   No No   Sig: Apply topically 2 times daily   Patient taking differently: Apply topically daily as needed   aspirin (ASA) 81 MG chewable tablet   No No   Si tablet (81 mg) by Oral or Feeding Tube route daily   clobetasol (TEMOVATE) 0.05 % external cream   No No   Sig: Apply to AA BID x 1-2 weeks then PRN. Do not apply to face.   dapsone (ACZONE) 25 MG tablet   No No   Si tablets (50 mg) by Oral or Feeding Tube route daily   inFLIXimab (REMICADE) 100 MG injection   No No   Sig: HOLD FOR NOW AND DISCUSS RESTARTING WITH RECURRENCE OF SYMPTOMS.   magnesium oxide (MAG-OX) 400 MG tablet   No No   Sig: Take 1 tablet (400 mg) by mouth daily   methocarbamol (ROBAXIN) 500 MG tablet   No No   Sig: Take 1 tablet (500 mg) by mouth 4 times daily as needed for muscle spasms   mycophenolate (GENERIC EQUIVALENT) 250 MG capsule   No No   Sig: Take 3 capsules (750 mg) by mouth 2 times daily   oxyCODONE (ROXICODONE) 5 MG tablet   No No   Si tablet (5 mg) by Oral or Feeding Tube route every 8 hours as needed for moderate pain   pantoprazole (PROTONIX) 40 MG EC tablet   No No   Sig: Take 1 tablet (40 mg) by mouth daily   polyethylene glycol (MIRALAX) 17 GM/Dose powder   No No   Sig: Take 17 g by mouth 2 times daily as  needed for constipation   predniSONE (DELTASONE) 5 MG tablet   No No   Si tablet (5 mg) by Oral or Feeding Tube route daily   senna-docusate (SENOKOT-S/PERICOLACE) 8.6-50 MG tablet   No No   Si-2 tablets by Oral or Feeding Tube route 2 times daily as needed for constipation   tacrolimus (GENERIC EQUIVALENT) 0.5 MG capsule   No No   Sig: Take 1 capsule (0.5 mg) by mouth 2 times daily Total discharge dose = 1.5 mg BID   tacrolimus (GENERIC EQUIVALENT) 1 MG capsule   No No   Sig: Take 1 capsule (1 mg) by mouth 2 times daily Total discharge dose = 1.5 mg BID   traZODone (DESYREL) 50 MG tablet   No No   Sig: Take 0.5-1 tablets (25-50 mg) by mouth nightly as needed for sleep   triamcinolone (KENALOG) 0.1 % external cream  Self No No   Sig: Apply to AA BID x 1-2 week then PRN only   ursodiol (ACTIGALL) 300 MG capsule   No No   Si capsule (300 mg) by Oral or Feeding Tube route 2 times daily   valGANciclovir (VALCYTE) 450 MG tablet   No No   Sig: Take 1 tablet (450 mg) by mouth daily      Facility-Administered Medications: None     Allergies   Allergies   Allergen Reactions    Fish Oil      Redness and itching around eye area only - went away when fish oil capsules stopped     Metronidazole      pain/itching    Ppd [Tuberculin Purified Protein Derivative]     Sulfa Antibiotics      hives    Terbinafine And Related      Lamisil = mild urticarial reaction       Review of Systems   The 10 point Review of Systems is negative other than noted in the HPI or here. Soft/loose stools. Not watery.    Physical Exam   Temp: 98.1  F (36.7  C) Temp src: Oral BP: 113/67 Pulse: 93   Resp: 16 SpO2: 97 % O2 Device: None (Room air)    Vital Signs with Ranges  Temp:  [98.1  F (36.7  C)-98.9  F (37.2  C)] 98.1  F (36.7  C)  Pulse:  [] 93  Resp:  [16-18] 16  BP: ()/(62-67) 113/67  SpO2:  [95 %-97 %] 97 %  0 lbs 0 oz    Constitutional: NAD  Eyes: Clear  Respiratory: Unlabored, RA, IS 1250  Cardiovascular: RRR  GI: Soft,  nontender, patchy erythema right flank up to and including axilla  Lymph/Hematologic: No edema  Genitourinary: No maxwell  Skin: Patchy induration right arm, axilla  Musculoskeletal: Strength 5/5  Neurologic: A&Ox4  Neuropsychiatric: Calm    Data   Most Recent 3 CBC's:  Recent Labs   Lab Test 06/30/24  1218 06/28/24  0643 06/27/24  0650   WBC 26.0* 11.4* 9.3   HGB 8.9* 9.3* 9.0*   MCV 99 95 95    91* 74*     Most Recent 3 BMP's:  Recent Labs   Lab Test 06/30/24  1111 06/28/24  0643 06/27/24  1733 06/27/24  0823 06/27/24  0650   * 132*  --   --  134*   POTASSIUM 4.5 4.4  --   --  4.2   CHLORIDE 97* 100  --   --  101   CO2 21* 25  --   --  23   BUN 10.5 21.2  --   --  30.7*   CR 0.70 0.88  --   --  0.90   ANIONGAP 10 7  --   --  10   ARIEL 8.2* 7.9*  --   --  8.0*   * 108* 187*   < > 109*    < > = values in this interval not displayed.     Most Recent 2 LFT's:  Recent Labs   Lab Test 06/30/24  1111 06/28/24  0643   AST 25 43   * 205*   ALKPHOS 140 142   BILITOTAL 1.8* 1.8*

## 2024-06-30 NOTE — ED TRIAGE NOTES
W/C to triage for fever with tmax 101.4 this morning  Liver tx 6/24       Triage Assessment (Adult)       Row Name 06/30/24 1041          Triage Assessment    Airway WDL WDL        Respiratory WDL    Respiratory WDL WDL        Skin Circulation/Temperature WDL    Skin Circulation/Temperature WDL WDL        Cardiac WDL    Cardiac WDL WDL        Peripheral/Neurovascular WDL    Peripheral Neurovascular WDL WDL        Cognitive/Neuro/Behavioral WDL    Cognitive/Neuro/Behavioral WDL WDL

## 2024-06-30 NOTE — TELEPHONE ENCOUNTER
Rex, family, called to report that [place] has a fever 101.3.   Pt had a headache  yesterday, but no sore throat. No abdominal pain. No redness or drainage from incision.     Family does not have a home covid test that is not .     Homecare was out yesterday and did assessment and temp was normal. 98.1. HR 87. /82 oxygen 93% at 1pm on Saturday.    Repeat temp 101.2 while on phone.    Pt will go to the [facility] for evaluation

## 2024-07-01 ENCOUNTER — PATIENT OUTREACH (OUTPATIENT)
Dept: CARE COORDINATION | Facility: CLINIC | Age: 59
End: 2024-07-01
Payer: MEDICARE

## 2024-07-01 ENCOUNTER — TELEPHONE (OUTPATIENT)
Dept: TRANSPLANT | Facility: CLINIC | Age: 59
End: 2024-07-01
Payer: MEDICARE

## 2024-07-01 LAB
ALBUMIN SERPL BCG-MCNC: 2.8 G/DL (ref 3.5–5.2)
ALBUMIN SERPL BCG-MCNC: 2.8 G/DL (ref 3.5–5.2)
ALBUMIN UR-MCNC: 50 MG/DL
ALP SERPL-CCNC: 123 U/L (ref 40–150)
ALP SERPL-CCNC: 133 U/L (ref 40–150)
ALT SERPL W P-5'-P-CCNC: 67 U/L (ref 0–50)
ALT SERPL W P-5'-P-CCNC: 78 U/L (ref 0–50)
ANION GAP SERPL CALCULATED.3IONS-SCNC: 11 MMOL/L (ref 7–15)
ANION GAP SERPL CALCULATED.3IONS-SCNC: 9 MMOL/L (ref 7–15)
APPEARANCE UR: ABNORMAL
AST SERPL W P-5'-P-CCNC: 17 U/L (ref 0–45)
AST SERPL W P-5'-P-CCNC: 18 U/L (ref 0–45)
BILIRUB SERPL-MCNC: 1.8 MG/DL
BILIRUB SERPL-MCNC: 1.8 MG/DL
BILIRUB UR QL STRIP: NEGATIVE
BUN SERPL-MCNC: 7.8 MG/DL (ref 8–23)
BUN SERPL-MCNC: 8.9 MG/DL (ref 8–23)
C DIFF TOX B STL QL: NEGATIVE
CALCIUM SERPL-MCNC: 7.2 MG/DL (ref 8.6–10)
CALCIUM SERPL-MCNC: 7.5 MG/DL (ref 8.6–10)
CHLORIDE SERPL-SCNC: 101 MMOL/L (ref 98–107)
CHLORIDE SERPL-SCNC: 99 MMOL/L (ref 98–107)
CMV DNA SPEC NAA+PROBE-ACNC: NOT DETECTED IU/ML
COLOR UR AUTO: ABNORMAL
CREAT SERPL-MCNC: 0.66 MG/DL (ref 0.51–0.95)
CREAT SERPL-MCNC: 0.68 MG/DL (ref 0.51–0.95)
CRP SERPL-MCNC: 145 MG/L
DEPRECATED HCO3 PLAS-SCNC: 17 MMOL/L (ref 22–29)
DEPRECATED HCO3 PLAS-SCNC: 20 MMOL/L (ref 22–29)
EBV DNA SERPL NAA+PROBE-ACNC: NOT DETECTED IU/ML
EGFRCR SERPLBLD CKD-EPI 2021: >90 ML/MIN/1.73M2
EGFRCR SERPLBLD CKD-EPI 2021: >90 ML/MIN/1.73M2
ERYTHROCYTE [DISTWIDTH] IN BLOOD BY AUTOMATED COUNT: 18.4 % (ref 10–15)
ERYTHROCYTE [DISTWIDTH] IN BLOOD BY AUTOMATED COUNT: 18.4 % (ref 10–15)
GLUCOSE SERPL-MCNC: 109 MG/DL (ref 70–99)
GLUCOSE SERPL-MCNC: 137 MG/DL (ref 70–99)
GLUCOSE UR STRIP-MCNC: NEGATIVE MG/DL
HCT VFR BLD AUTO: 24 % (ref 35–47)
HCT VFR BLD AUTO: 27.3 % (ref 35–47)
HGB BLD-MCNC: 8 G/DL (ref 11.7–15.7)
HGB BLD-MCNC: 9 G/DL (ref 11.7–15.7)
HGB UR QL STRIP: NEGATIVE
KETONES UR STRIP-MCNC: ABNORMAL MG/DL
LACTATE SERPL-SCNC: 2 MMOL/L (ref 0.7–2)
LACTATE SERPL-SCNC: 3 MMOL/L (ref 0.7–2)
LACTATE SERPL-SCNC: 3.3 MMOL/L (ref 0.7–2)
LEUKOCYTE ESTERASE UR QL STRIP: ABNORMAL
MAGNESIUM SERPL-MCNC: 1.9 MG/DL (ref 1.7–2.3)
MCH RBC QN AUTO: 32.7 PG (ref 26.5–33)
MCH RBC QN AUTO: 32.8 PG (ref 26.5–33)
MCHC RBC AUTO-ENTMCNC: 33 G/DL (ref 31.5–36.5)
MCHC RBC AUTO-ENTMCNC: 33.3 G/DL (ref 31.5–36.5)
MCV RBC AUTO: 98 FL (ref 78–100)
MCV RBC AUTO: 99 FL (ref 78–100)
NITRATE UR QL: NEGATIVE
PH UR STRIP: 6 [PH] (ref 5–7)
PHOSPHATE SERPL-MCNC: 2.7 MG/DL (ref 2.5–4.5)
PLATELET # BLD AUTO: 230 10E3/UL (ref 150–450)
PLATELET # BLD AUTO: 271 10E3/UL (ref 150–450)
POTASSIUM SERPL-SCNC: 3.5 MMOL/L (ref 3.4–5.3)
POTASSIUM SERPL-SCNC: 4.1 MMOL/L (ref 3.4–5.3)
PROCALCITONIN SERPL IA-MCNC: 0.68 NG/ML
PROT SERPL-MCNC: 5.1 G/DL (ref 6.4–8.3)
PROT SERPL-MCNC: 5.2 G/DL (ref 6.4–8.3)
RBC # BLD AUTO: 2.44 10E6/UL (ref 3.8–5.2)
RBC # BLD AUTO: 2.75 10E6/UL (ref 3.8–5.2)
RBC URINE: 0 /HPF
SODIUM SERPL-SCNC: 128 MMOL/L (ref 135–145)
SODIUM SERPL-SCNC: 129 MMOL/L (ref 135–145)
SP GR UR STRIP: 1.02 (ref 1–1.03)
SQUAMOUS EPITHELIAL: 4 /HPF
TACROLIMUS BLD-MCNC: 5.7 UG/L (ref 5–15)
TME LAST DOSE: NORMAL H
TME LAST DOSE: NORMAL H
UROBILINOGEN UR STRIP-MCNC: NORMAL MG/DL
WBC # BLD AUTO: 23.3 10E3/UL (ref 4–11)
WBC # BLD AUTO: 32.3 10E3/UL (ref 4–11)
WBC URINE: 11 /HPF

## 2024-07-01 PROCEDURE — 36415 COLL VENOUS BLD VENIPUNCTURE: CPT | Performed by: DERMATOLOGY

## 2024-07-01 PROCEDURE — 250N000012 HC RX MED GY IP 250 OP 636 PS 637: Performed by: NURSE PRACTITIONER

## 2024-07-01 PROCEDURE — 80197 ASSAY OF TACROLIMUS: CPT

## 2024-07-01 PROCEDURE — 87040 BLOOD CULTURE FOR BACTERIA: CPT | Performed by: PHYSICIAN ASSISTANT

## 2024-07-01 PROCEDURE — 250N000013 HC RX MED GY IP 250 OP 250 PS 637: Performed by: NURSE PRACTITIONER

## 2024-07-01 PROCEDURE — 99221 1ST HOSP IP/OBS SF/LOW 40: CPT | Mod: GC | Performed by: DERMATOLOGY

## 2024-07-01 PROCEDURE — 258N000003 HC RX IP 258 OP 636

## 2024-07-01 PROCEDURE — 83605 ASSAY OF LACTIC ACID: CPT | Performed by: DERMATOLOGY

## 2024-07-01 PROCEDURE — 86140 C-REACTIVE PROTEIN: CPT | Performed by: PHYSICIAN ASSISTANT

## 2024-07-01 PROCEDURE — 85027 COMPLETE CBC AUTOMATED: CPT

## 2024-07-01 PROCEDURE — 250N000011 HC RX IP 250 OP 636: Performed by: STUDENT IN AN ORGANIZED HEALTH CARE EDUCATION/TRAINING PROGRAM

## 2024-07-01 PROCEDURE — 85027 COMPLETE CBC AUTOMATED: CPT | Performed by: PHYSICIAN ASSISTANT

## 2024-07-01 PROCEDURE — 120N000011 HC R&B TRANSPLANT UMMC

## 2024-07-01 PROCEDURE — 83605 ASSAY OF LACTIC ACID: CPT | Performed by: PHYSICIAN ASSISTANT

## 2024-07-01 PROCEDURE — 87086 URINE CULTURE/COLONY COUNT: CPT | Performed by: PHYSICIAN ASSISTANT

## 2024-07-01 PROCEDURE — 250N000013 HC RX MED GY IP 250 OP 250 PS 637

## 2024-07-01 PROCEDURE — 258N000003 HC RX IP 258 OP 636: Performed by: PHYSICIAN ASSISTANT

## 2024-07-01 PROCEDURE — 81001 URINALYSIS AUTO W/SCOPE: CPT | Performed by: PHYSICIAN ASSISTANT

## 2024-07-01 PROCEDURE — 36415 COLL VENOUS BLD VENIPUNCTURE: CPT | Performed by: PHYSICIAN ASSISTANT

## 2024-07-01 PROCEDURE — 84100 ASSAY OF PHOSPHORUS: CPT | Performed by: TRANSPLANT SURGERY

## 2024-07-01 PROCEDURE — 99232 SBSQ HOSP IP/OBS MODERATE 35: CPT | Performed by: PHYSICIAN ASSISTANT

## 2024-07-01 PROCEDURE — 36415 COLL VENOUS BLD VENIPUNCTURE: CPT

## 2024-07-01 PROCEDURE — 84155 ASSAY OF PROTEIN SERUM: CPT | Performed by: PHYSICIAN ASSISTANT

## 2024-07-01 PROCEDURE — 84145 PROCALCITONIN (PCT): CPT | Performed by: PHYSICIAN ASSISTANT

## 2024-07-01 PROCEDURE — 84075 ASSAY ALKALINE PHOSPHATASE: CPT | Performed by: PHYSICIAN ASSISTANT

## 2024-07-01 PROCEDURE — 83735 ASSAY OF MAGNESIUM: CPT | Performed by: TRANSPLANT SURGERY

## 2024-07-01 PROCEDURE — 82040 ASSAY OF SERUM ALBUMIN: CPT

## 2024-07-01 RX ORDER — DAPSONE 25 MG/1
50 TABLET ORAL DAILY
Status: DISCONTINUED | OUTPATIENT
Start: 2024-07-01 | End: 2024-07-06

## 2024-07-01 RX ORDER — PANTOPRAZOLE SODIUM 40 MG/1
40 TABLET, DELAYED RELEASE ORAL DAILY
Status: DISCONTINUED | OUTPATIENT
Start: 2024-07-01 | End: 2024-07-06 | Stop reason: HOSPADM

## 2024-07-01 RX ORDER — URSODIOL 300 MG/1
300 CAPSULE ORAL 2 TIMES DAILY
Status: DISCONTINUED | OUTPATIENT
Start: 2024-07-01 | End: 2024-07-06 | Stop reason: HOSPADM

## 2024-07-01 RX ORDER — AMOXICILLIN 250 MG
1-2 CAPSULE ORAL 2 TIMES DAILY PRN
Status: DISCONTINUED | OUTPATIENT
Start: 2024-07-01 | End: 2024-07-06 | Stop reason: HOSPADM

## 2024-07-01 RX ORDER — POLYETHYLENE GLYCOL 3350 17 G/17G
17 POWDER, FOR SOLUTION ORAL 2 TIMES DAILY PRN
Status: DISCONTINUED | OUTPATIENT
Start: 2024-07-01 | End: 2024-07-06 | Stop reason: HOSPADM

## 2024-07-01 RX ORDER — TACROLIMUS 1 MG/1
2 CAPSULE ORAL
Status: DISCONTINUED | OUTPATIENT
Start: 2024-07-01 | End: 2024-07-03

## 2024-07-01 RX ORDER — LORAZEPAM 2 MG/ML
0.25 INJECTION INTRAMUSCULAR ONCE
Status: COMPLETED | OUTPATIENT
Start: 2024-07-02 | End: 2024-07-02

## 2024-07-01 RX ORDER — MAGNESIUM OXIDE 400 MG/1
400 TABLET ORAL DAILY
Status: DISCONTINUED | OUTPATIENT
Start: 2024-07-01 | End: 2024-07-05

## 2024-07-01 RX ADMIN — OXYCODONE HYDROCHLORIDE 5 MG: 5 TABLET ORAL at 21:50

## 2024-07-01 RX ADMIN — VANCOMYCIN HYDROCHLORIDE 1000 MG: 1 INJECTION, SOLUTION INTRAVENOUS at 02:26

## 2024-07-01 RX ADMIN — SODIUM CHLORIDE 500 ML: 9 INJECTION, SOLUTION INTRAVENOUS at 21:30

## 2024-07-01 RX ADMIN — MYCOPHENOLATE MOFETIL 750 MG: 250 CAPSULE ORAL at 09:18

## 2024-07-01 RX ADMIN — TRAZODONE HYDROCHLORIDE 50 MG: 50 TABLET ORAL at 21:46

## 2024-07-01 RX ADMIN — PANTOPRAZOLE SODIUM 40 MG: 40 TABLET, DELAYED RELEASE ORAL at 14:26

## 2024-07-01 RX ADMIN — ASPIRIN 81 MG 81 MG: 81 TABLET ORAL at 09:18

## 2024-07-01 RX ADMIN — PIPERACILLIN AND TAZOBACTAM 4.5 G: 4; .5 INJECTION, POWDER, LYOPHILIZED, FOR SOLUTION INTRAVENOUS at 17:30

## 2024-07-01 RX ADMIN — PIPERACILLIN AND TAZOBACTAM 4.5 G: 4; .5 INJECTION, POWDER, LYOPHILIZED, FOR SOLUTION INTRAVENOUS at 12:53

## 2024-07-01 RX ADMIN — TACROLIMUS 1.5 MG: 1 CAPSULE ORAL at 09:18

## 2024-07-01 RX ADMIN — ACETAMINOPHEN 650 MG: 325 TABLET, FILM COATED ORAL at 06:40

## 2024-07-01 RX ADMIN — ACETAMINOPHEN 650 MG: 325 TABLET, FILM COATED ORAL at 19:35

## 2024-07-01 RX ADMIN — DAPSONE 50 MG: 25 TABLET ORAL at 14:26

## 2024-07-01 RX ADMIN — PIPERACILLIN AND TAZOBACTAM 4.5 G: 4; .5 INJECTION, POWDER, LYOPHILIZED, FOR SOLUTION INTRAVENOUS at 01:08

## 2024-07-01 RX ADMIN — VALGANCICLOVIR HYDROCHLORIDE 450 MG: 450 TABLET ORAL at 09:18

## 2024-07-01 RX ADMIN — MAGNESIUM OXIDE TAB 400 MG (241.3 MG ELEMENTAL MG) 400 MG: 400 (241.3 MG) TAB at 14:26

## 2024-07-01 RX ADMIN — MYCOPHENOLATE MOFETIL 750 MG: 250 CAPSULE ORAL at 19:13

## 2024-07-01 RX ADMIN — VANCOMYCIN HYDROCHLORIDE 1000 MG: 1 INJECTION, SOLUTION INTRAVENOUS at 14:26

## 2024-07-01 RX ADMIN — TACROLIMUS 2 MG: 1 CAPSULE ORAL at 17:28

## 2024-07-01 RX ADMIN — SODIUM CHLORIDE: 9 INJECTION, SOLUTION INTRAVENOUS at 12:53

## 2024-07-01 RX ADMIN — URSODIOL 300 MG: 300 CAPSULE ORAL at 19:13

## 2024-07-01 RX ADMIN — PIPERACILLIN AND TAZOBACTAM 4.5 G: 4; .5 INJECTION, POWDER, LYOPHILIZED, FOR SOLUTION INTRAVENOUS at 06:39

## 2024-07-01 RX ADMIN — PREDNISONE 5 MG: 5 TABLET ORAL at 09:18

## 2024-07-01 ASSESSMENT — ACTIVITIES OF DAILY LIVING (ADL)
ADLS_ACUITY_SCORE: 35
ADLS_ACUITY_SCORE: 32
ADLS_ACUITY_SCORE: 32
ADLS_ACUITY_SCORE: 35
ADLS_ACUITY_SCORE: 35
ADLS_ACUITY_SCORE: 32
ADLS_ACUITY_SCORE: 30
ADLS_ACUITY_SCORE: 35
ADLS_ACUITY_SCORE: 35
ADLS_ACUITY_SCORE: 32
ADLS_ACUITY_SCORE: 35
ADLS_ACUITY_SCORE: 35
ADLS_ACUITY_SCORE: 32
ADLS_ACUITY_SCORE: 35
ADLS_ACUITY_SCORE: 35
DEPENDENT_IADLS:: CLEANING;COOKING;LAUNDRY;SHOPPING;MEAL PREPARATION;MEDICATION MANAGEMENT;TRANSPORTATION
ADLS_ACUITY_SCORE: 32
ADLS_ACUITY_SCORE: 30
ADLS_ACUITY_SCORE: 35
ADLS_ACUITY_SCORE: 32
ADLS_ACUITY_SCORE: 35

## 2024-07-01 NOTE — PROGRESS NOTES
Admitted/transferred from: ED  Time of arrival on unit 2130  2 RN full  skin assessment completed by Lynette WATSON and Halle GRANDA  Skin assessment finding: clamshell incision stapled, KRISTIAN; scattered bruising; rash on most of pt's body except legs   Interventions/actions: rash borders marked     Will continue to monitor.

## 2024-07-01 NOTE — PROGRESS NOTES
[facility]    Background: Transitional Care Management program auto-identified and prompting a chart review by [facility] team.    Assessment: Upon chart review, [facility] Team member will Enroll this episode of Transitional Care Management program due to reason below:    Upon chart review, patient is followed by [facility] Transplant team who follow their patients closely. [facility] will not conduct outreach to avoid duplication of outreach to patient.     Plan: Transitional Care Management episode enrolled per reason above.      *Connected Care Resource Team does NOT follow patient ongoing. Referrals are identified based on internal discharge reports and the outreach is to ensure patient has an understanding of their discharge instructions.

## 2024-07-01 NOTE — PROGRESS NOTES
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 Other history includes osteopenia, LE cellulitis, crohn's disease on infliximab, and obesity. She underwent DCD DDLT w/ biliary stent placement (on OCS x 19.4 hours) on 6/22/24 with Dr. Ball. She discharged 6/28 and now presents with fever and new patchy induration to extremities and flank.     s/p DCD DDLT +stent 6/22/24: POD#9. LFTs continue to downtrend overall.   - Continue ASA 81 mg daily and ursodiol 300 BID.      Immunosuppressed status secondary to medications:   Maintenance:    -  mg BID   - Tacrolimus 2 mg BID. Goal level 8-12.    - Prednisone 5 mg daily     Neuro:  Acute post op pain: Continue PRN Tylenol, Robaxin, oxycodone.   Insomnia: Trazodone at bedtime.       Hematology:   Acute blood loss anemia: Hgb 8-9, stable.     Cardiorespiratory: No issues.      GI/Nutrition:   Moderate malnutrition in the context of chronic illness: due to mild muscle loss, fluid accumulation, low albumin. Continue Regular diet with protein supplements.    Endocrine: No issues.      Fluid/Electrolytes:   Hyponatremia: Na 128 (from 130), receiving  mL/hr.   Hypomagnesemia: Continue Mag-Ox 400 mg daily.      Infectious disease:   Fevers of unknown origin: Admitted with fevers and leukocytosis. Infectious workup negative to date. Possible cellulitis with new patchy induration to extremities and flank.    - Continue Vanc/Zosyn.    Derm:  Rash: New patchy induration noted on flank and extremities, marked.    - Dermatology consulted.     Prophylaxis: DVT (mechanical), fall, GI (PPI), viral (Valcyte), pneumocystis (sulfa allergy, G6PD normal, continue dapsone)     Disposition: 7A    DINESH/Fellow/Resident Provider: Philly Webb NP #[id number]     Faculty: Benedicto Elizalde,  M.D.    __________________________________________________________________  Transplant History:    6/22/2024 (Liver), Postoperative day: 9     Interval History: History is obtained from the patient  Overnight events: No acute events overnight. Remains afebrile on empiric antibiotics. Patient reports rash has worsened.     ROS:   A 10-point review of systems was negative except as noted above.    Curent Meds:  Current Facility-Administered Medications   Medication Dose Route Frequency Provider Last Rate Last Admin    aspirin (ASA) chewable tablet 81 mg  81 mg Oral Daily Miguelina Weinstein APRN CNP   81 mg at 07/01/24 0918    mycophenolate (GENERIC EQUIVALENT) capsule 750 mg  750 mg Oral BID Miguelina Weinstein APRN CNP   750 mg at 07/01/24 0918    piperacillin-tazobactam (ZOSYN) 4.5 g vial to attach to  mL bag  4.5 g Intravenous Q6H Soledad Gomez MD 0 mL/hr at 06/30/24 2033 4.5 g at 07/01/24 0639    predniSONE (DELTASONE) tablet 5 mg  5 mg Oral Daily Miguelina Weinstein APRN CNP   5 mg at 07/01/24 0918    sodium chloride (PF) 0.9% PF flush 3 mL  3 mL Intracatheter Q8H Soledad Gomez MD   3 mL at 06/30/24 1855    tacrolimus (GENERIC EQUIVALENT) capsule 1.5 mg  1.5 mg Oral BID IS Miguelina Weinstein APRN CNP   1.5 mg at 07/01/24 0918    traZODone (DESYREL) half-tab 25-50 mg  25-50 mg Oral At Bedtime Fab Velazquez MD        Or    traZODone (DESYREL) tablet 50 mg  50 mg Oral At Bedtime Fab Velazquez MD   50 mg at 06/30/24 2317    valGANciclovir (VALCYTE) tablet 450 mg  450 mg Oral Daily Miguelina Weinstein APRN CNP   450 mg at 07/01/24 0918    vancomycin (VANCOCIN) 1,000 mg in 200 mL dextrose intermittent infusion  1,000 mg Intravenous Q12H Soledad Gomez  mL/hr at 07/01/24 0226 1,000 mg at 07/01/24 0226       Physical Exam:     Current Vitals:   /62 (BP Location: Left arm)   Pulse 93   Temp 98.4  F (36.9  C) (Oral)   Resp 16   LMP 05/31/2006   SpO2 98%     Vital sign ranges:     Temp:  [97.6  F (36.4  C)-98.6  F (37  C)] 98.4  F (36.9  C)  Pulse:  [] 93  Resp:  [14-18] 16  BP: (107-122)/(57-87) 107/62  SpO2:  [95 %-100 %] 98 %    General Appearance: in no apparent distress.   Skin: erythema noted bilaterally on extremities, axilla, flank  Heart: RRR  Lungs: unlabored on RA  Abdomen: soft, non-distended. Incision C/D/I.   : no Conti present  Extremities: no edema noted  Neurologic: A&OX4. Tremor absent.     Frailty Scores          4/23/2024 2/4/2024 2/14/2023   Frailty Scores   Final Score Not Frail Not Frail Not Frail   Final Score Number 0 0 1      Details                   Data:   CMP  Recent Labs   Lab 07/01/24  0608 06/30/24  1728 06/30/24  1111   * 130* 128*   POTASSIUM 4.1 4.6 4.5   CHLORIDE 99 102 97*   CO2 20* 21* 21*   * 115* 148*   BUN 7.8* 8.6 10.5   CR 0.68 0.63 0.70   GFRESTIMATED >90 >90 >90   ARIEL 7.5* 7.7* 8.2*   MAG 1.9 1.4*  --    PHOS 2.7 2.7  --    LIPASE  --   --  24   ALBUMIN 2.8* 2.8* 3.1*   BILITOTAL 1.8* 1.7* 1.8*   ALKPHOS 123 121 140   AST 18 22 25   ALT 78* 93* 114*     CBC  Recent Labs   Lab 07/01/24  0608 06/30/24  1728   HGB 9.0* 8.6*   WBC 23.3* 24.7*    197     Attestation:    The patient has been seen with team and evaluated by me.   Vital signs, labs, medications and orders were reviewed.   When obtained, diagnostic images were reviewed by me and interpreted as above.    The care plan was discussed with the multidisciplinary team and I agree with the findings and plan in this note, with any differences recorded in blue.    Immunosuppressive medication management was reviewed and adjusted as reflected in the note and orders.       Benedicto Elizalde MD  Professor of Surgery

## 2024-07-01 NOTE — PLAN OF CARE
Goal Outcome Evaluation:      Plan of Care Reviewed With: patient, spouse    Overall Patient Progress: no changeOverall Patient Progress: no change    Outcome Evaluation: Patient anticipates  discharge to home with home health services

## 2024-07-01 NOTE — PLAN OF CARE
[id number]    V/S: VSS, afebrile. -130s. HR 70-90s.  Neuro: Pt is A&O x4, no c/o headache & lightheadedness. No c/o numbness & tingling, calls appropriately.  Resp: RA. Sats > 95, no c/o SOB. Lung sounds clear & diminished in bases bilaterally.  Cardiac: Tele SR. No c/o chest pain & palpitations.  GI/: No BG checks. Regular diet, tolerating well. No FR. No c/o n/v/d. Voiding via bathroom. Last BM 7/1/2024, loose & brown (see flowsheets).  Skin: Skin dry & pale w/ red rash scattered across body (see flowsheets). No new deficits noted.  Pain: C/o generalized abdominal incision pain, pt declined pain medication and wanted to wait to get them.  Activity: SBA w/ walker in room & in hallways.  Electrolytes: Magnesium replacement given, recheck in am.  LDAs: L PIV infusing w/ NS @ 100 ml/hr.     Plan of care ongoing.  Patient currently resting in bed with call light in reach.     Goal Outcome Evaluation:    Overall Patient Progress: no change    Outcome Evaluation: Dermatology consulted today. Pt started on dapsone & continuing IV abx. Continue plan of care and report changes to the team.

## 2024-07-01 NOTE — PROGRESS NOTES
Care Management Initial Consult    General Information  Assessment completed with: Patient, Care Team Member, Spouse or significant other, VM-chart review,    Type of CM/SW Visit: Initial Assessment    Primary Care Provider verified and updated as needed: Yes   Readmission within the last 30 days: other (see comments)      Reason for Consult: discharge planning  Advance Care Planning: Advance Care Planning Reviewed: present on chart          Communication Assessment  Patient's communication style: spoken language (English or Bilingual)    Hearing Difficulty or Deaf: no   Wear Glasses or Blind: yes    Cognitive  Cognitive/Neuro/Behavioral: WDL                      Living Environment:   People in home: spouse, child(elisa), adult     Current living Arrangements: house      Able to return to prior arrangements: yes       Family/Social Support:  Care provided by: self, spouse/significant other, homecare agency  Provides care for: no one  Marital Status:   , Children          Description of Support System: Supportive, Involved    Support Assessment: Adequate family and caregiver support    Current Resources:   Patient receiving home care services: Yes  Skilled Home Care Services: Skilled Nursing, Physical Therapy, Occupational Therapy  Community Resources: Home Care  Equipment currently used at home: walker, rolling  Supplies currently used at home: None    Employment/Financial:  Employment Status:          Financial Concerns: none           Does the patient's insurance plan have a 3 day qualifying hospital stay waiver?  No    Lifestyle & Psychosocial Needs:  Social Determinants of Health     Food Insecurity: Low Risk  (2/4/2024)    Food Insecurity     Within the past 12 months, did you worry that your food would run out before you got money to buy more?: No     Within the past 12 months, did the food you bought just not last and you didn t have money to get more?: No   Depression: Not at risk (2/7/2024)     PHQ-2     PHQ-2 Score: 0   Housing Stability: Low Risk  (2/4/2024)    Housing Stability     Do you have housing? : Yes     Are you worried about losing your housing?: No   Tobacco Use: Low Risk  (4/24/2024)    Patient History     Smoking Tobacco Use: Never     Smokeless Tobacco Use: Never     Passive Exposure: Never   Financial Resource Strain: Low Risk  (2/4/2024)    Financial Resource Strain     Within the past 12 months, have you or your family members you live with been unable to get utilities (heat, electricity) when it was really needed?: No   Alcohol Use: Not on file   Transportation Needs: Low Risk  (2/4/2024)    Transportation Needs     Within the past 12 months, has lack of transportation kept you from medical appointments, getting your medicines, non-medical meetings or appointments, work, or from getting things that you need?: No   Physical Activity: Unknown (9/15/2020)    Exercise Vital Sign     Days of Exercise per Week: 0 days     Minutes of Exercise per Session: Not on file   Interpersonal Safety: Low Risk  (2/7/2024)    Interpersonal Safety     Do you feel physically and emotionally safe where you currently live?: Yes     Within the past 12 months, have you been hit, slapped, kicked or otherwise physically hurt by someone?: No     Within the past 12 months, have you been humiliated or emotionally abused in other ways by your partner or ex-partner?: No   Stress: Stress Concern Present (9/15/2020)    [employer] - Occupational Stress Questionnaire     Feeling of Stress : Rather much   Social Connections: Unknown (9/15/2020)    Social Connection and Isolation Panel [NHANES]     Frequency of Communication with Friends and Family: More than three times a week     Frequency of Social Gatherings with Friends and Family: More than three times a week     Attends Mormonism Services: Not on file     Active Member of Clubs or Organizations: Not on file     Attends Club or  Organization Meetings: Not on file     Marital Status: Not on file   Health Literacy: Not on file       Functional Status:  Prior to admission patient needed assistance:   Dependent ADLs:: Bathing, Dressing, Eating, Grooming  Dependent IADLs:: Cleaning, Cooking, Laundry, Shopping, Meal Preparation, Medication Management, Transportation       Values/Beliefs:  Spiritual, Cultural Beliefs, Scientologist Practices, Values that affect care:                 Additional Information:  Per H & P patient with a medical history significant for 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 Other history includes osteopenia, LE cellulitis, crohn's disease on infliximab, and obesity. She underwent DCD DDLT w/ biliary stent placement (on OCS x 19.4 hours) on 6/22/24 with Dr. Ball. She discharged 6/28 and now presents with fever and new patchy induration to extremities and flank. Pt known to writer from recent hospitalization.  Pt discharged with [facility] with estimated start of care on Saturday 6/29,     Met with pt and spouse.  Pt confirmed she was opened to home care services on 6/29.  Pt and spouse voiced concerns regarding having to be readmitted which was a concern that they had expressed prior to being discharged last week.  Provided supportive listening.  Pt and spouse note no concerns or questions at this time.  Agreed to follow up with pt when closer to discharge.    Susan Patel RN BSN, PHN, ACM-RN  July 1, 2024  Nurse Coordinator    Phone: [phone]    Social Work and Care Management Department     SEARCHABLE in [facility] - search CARE COORDINATOR     [place] ([id number]) Saturday & Sunday; ([id number]) [facility] Recognized Holidays     Units: 5A Onc Vocera & 5C Vocera     Units: 6B Vocera & 6C Vocera      Units: 7A SOT RNCC Vocera, 7B Med Surg Vocera, & 7C Med Surg Vocera      Units: 6A Vocera & 4A CVICU Vocera, 4C MICU Vocera, and 4E SICU Vocera       Units: 5 Ortho Vocera & 5 Med Surg Vocera      Units: 6 Med Surg Vocera & 8 Med Surg Vocera      7/1/2024                  Accepting Home Care  [facility]   [phone]   Fax [phone]

## 2024-07-01 NOTE — CONSULTS
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 The onset of the rash follows systemic exposure to a medication, most commonly antibiotics, antifungals, everolimus, paracetamol, NSAIDs, hydroxyzine-received while inpt, infliximab -received 6/12 for Crohn's, cimetidine, barium sulfate - recent enterography, opioids, radiocontrast media among others.   The latency period could range from hours to days after exposure and can even occur after cessation of medication. The pathogenesis is unknown however it is speculated that a T cell-mediated delayed-type hypersensitivity reaction may mitigate the eruption and that increased drug excretion on the skin folds, which have a high density of eccrine glands, could explain the distribution of the rash.  The patient has a few potential culprits including antibiotics, NSAIDs, opiates, hydroxyzine while hospitalized, she also received infliximab for her Crohn's and had recent contrast, all of which could be implicated. Fortunately SDRIFE is typically self limited and symptomatic relief is mainstay. We will start topicals and watch her closely for progression. No indication for skin biopsy a this time, will monitor for progression and reconsider at that time.   - START topical triamcinolone ointment 0.1 % BID to areas of involvement   - no indication for antibiotics from dermatologic standpoint   - no indication for skin biopsy at this time   - Dermatology will continue to follow     Other medical history:   -History of  cirrhosis 2/2 DUNN/EtOH complicated by HCC  "and ascites s/p DCD  donor liver transplant w/ biliary stent placement on 24    -Crohn's disease on infliximab              Thank you for the dermatology consultation. Please do not hesitate to contact the dermatology resident/faculty on call for any additional questions or concerns. We will continue to follow.     Patient seen and evaluated with attending physician, Dr. Tanya Colindres, DO  Dermatology Resident    Dermatology Problem List:  # Concern for Symmetric drug-related intertriginous and flexural exanthema (SDRIFE)   # Asymptomatic well demarcated pink plaques favoring the intertriginous and flexural creases     Date of Admission: 2024   Encounter Date: 2024    Reason for Consultation:   \"Admit with erythema bilateral arms; abdomen. Concern for cellulitis but not typical presentation. Thanks! \"     History of Present Illness:  Ms. Abiola Matute is a 58 year old female with history of  cirrhosis 2/2 DUNN/EtOH complicated by HCC and ascites s/p DCD  donor liver transplant w/ biliary stent placement on 24, osteopenia, LE cellulitis, crohn's disease on infliximab, and obesity who was admitted  for fever and concern for possible pneumonia. Dermatology was consulted for atypical rash.    Patient reports that onset of rash was a couple days prior to arrival. She first noticed in her bilateral axilla and then it began to spread down her arms and included antecubital fossa and then her bilateral groin and upwards to her flank. Now back and back of neck also involved. It is not itchy or painful. Does not burn or sting. Felt feverish on arrival but this has improved. No other concerns or complaints today.       Past Medical History:   Patient Active Problem List   Diagnosis    Non morbid obesity due to excess calories    Other isolated or specific phobias    Other congenital malformations of mouth    Contact dermatitis and other eczema, due to unspecified " cause    Intestinal infection due to Clostridium difficile    Iron deficiency anemia, unspecified iron deficiency anemia type    Rosacea    Atypical ductal hyperplasia of left breast    Idiopathic thrombocytopenia (H)    Postmenopausal- s/p hysterectomy with BSO - not on HRT secondary to atypical ductal hyperplasia & breast pain -no paps needed    Claustrophobia    S/P total hysterectomy and bilateral salpingo-oophorectomy    Abnormal liver enzymes- ? related to ongoing etoh use/abuse    Essential hypertension with goal blood pressure less than 140/90    Gastroenteritis    Stool incontinence    CARDIOVASCULAR SCREENING; LDL GOAL LESS THAN 130    AA (alcohol abuse) - ? ongoing     Alcoholic fatty liver    Acquired hyperbilirubinemia- ? secondary to alcohol use    Diffuse nodular cirrhosis of liver (H)    Severe Perianal Crohn's Disease    Biliary colic    Cholecystitis    Alcoholic cirrhosis of liver with ascites (H) - seeing [facility]     Abscess of anal or rectal region    Anal fistula    HCC (hepatocellular carcinoma) (H)    Alcohol use disorder, moderate, dependence (H)    Crohn's disease of large intestine with fistula (H)    Ascending aorta dilation (H24)    Hyponatremia    Liver cirrhosis (H)    S/P liver transplant (H)    Immunosuppressed status (H24)    Acute post-operative pain    Insomnia    Anemia due to blood loss, acute    Moderate malnutrition (H24)    RADHA (acute kidney injury) (H24)    Hypomagnesemia    Liver replaced by transplant (H)    Fever, unspecified fever cause    Pleural effusion on right    Pneumonia of right lung due to infectious organism, unspecified part of lung     Past Medical History:   Diagnosis Date    Alcohol abuse     Alcoholic cirrhosis of liver with ascites     Anal fistula     Atypical ductal hyperplasia of breast 09/10/2010    ERT not recommended -left - and flat epithelial atypia-scheduled for breast biopsy 9/17/2010     Bifid uvula     Cholelithiasis     Contact perianal  dermatitis and other eczema     recurrent - clobetasol     Crohn disease     Fear of flying      - gets Ativan prn.     HCC (hepatocellular carcinoma) (H) 2/1/2023    Hypertension     IBS (irritable bowel syndrome)      Past Surgical History:   Procedure Laterality Date    BENCH LIVER  6/22/2024    Procedure: Bench liver;  Surgeon: Pravin Ball MD;  Location: [facility] OR    BIOPSY ANAL N/A 3/28/2019    anal biopsy and culure placement of seton - Dr Fleming    BIOPSY BREAST Left 09/17/2010    - scheduled with Dr. Varma     BIOPSY LIVER  2019    COLONOSCOPY  2006    COLONOSCOPY N/A 12/23/2014    Procedure: COMBINED COLONOSCOPY, SINGLE OR MULTIPLE BIOPSY/POLYPECTOMY BY BIOPSY;  Surgeon: Diane Fleming MD;  Location:  GI    COLONOSCOPY N/A 12/5/2019    Procedure: COLONOSCOPY, WITH POLYPECTOMY AND BIOPSY;  Surgeon: Farhan Schilling MD;  Location: [facility] GI    COLONOSCOPY N/A 2/27/2024    Procedure: COLONOSCOPY, WITH POLYPECTOMY AND BIOPSY;  Surgeon: Michelle Diaz MD;  Location: [facility] OR    ENDOSCOPIC RETROGRADE CHOLANGIOPANCREATOGRAM N/A 4/24/2020    Procedure: ENDOSCOPIC RETROGRADE CHOLANGIOPANCREATOGRAPHY WITH, sledge removal,sphincterotomy, stent in gallbladder and pancreatic duct stent, and balloon dilation;  Surgeon: Guru Isac Kraft MD;  Location: [facility] OR    ESOPHAGOSCOPY, GASTROSCOPY, DUODENOSCOPY (EGD), COMBINED N/A 12/5/2019    Procedure: ESOPHAGOGASTRODUODENOSCOPY (EGD);  Surgeon: Farhan Schilling MD;  Location: [facility] GI    ESOPHAGOSCOPY, GASTROSCOPY, DUODENOSCOPY (EGD), COMBINED N/A 5/11/2023    Procedure: Esophagoscopy, gastroscopy, duodenoscopy (EGD), combined;  Surgeon: Jeannette Cervantes MD;  Location: [facility] GI    EXAM UNDER ANESTHESIA ANUS N/A 3/28/2019    Procedure: EXAM UNDER ANESTHESIA ANUS;  Surgeon: Diane Fleming MD;  Location:  OR    EXAM UNDER ANESTHESIA ANUS N/A 2/26/2021    Procedure: EXAM UNDER ANESTHESIA OF ANUS, SETON PLACEMENT, EXCISION  OF SKIN BRIDGE;  Surgeon: Avtar Nam MD;  Location: [facility] OR    EXAM UNDER ANESTHESIA ANUS N/A 2023    Procedure: EXAM UNDER ANESTHESIA, ANUS, SETON EXCHANGE, partial fistulotomy;  Surgeon: Mary Dugan MD;  Location: [facility] OR    HYSTERECTOMY, VAGINAL  2006    with Dr. Licha Zhou - with BSO for fibroids     IR GALLBLADDER DRAIN PLACEMENT  2020    IR SIRT (SELECTIVE INTERNAL RADIO THERAPY)  2023    IR VISCERAL ANGIOGRAM  2023    IR VISCERAL EMBOLIZATION  2023    LAPAROSCOPIC ABLATION LIVER TUMOR N/A 2023    Procedure: Diagnostic Laparoscopy, Laparoscopic microwave ablation of liver tumor intraoperative ultrasound of liver, lysis of adhesions 1 hour,;  Surgeon: Albino Saavedra MD;  Location: [facility] OR    OPEN REDUCTION INTERNAL FIXATION ANKLE Left at age 27    plates and screws removed at age 36    Pelviscopy with removal of bilateral hydrosalpinges.  04/15/2010    TRANSPLANT LIVER RECIPIENT  DONOR N/A 2024    Procedure: Transplant liver recipient  donor, with biliary stent placement;  Surgeon: Pravin Ball MD;  Location: [facility] OR    ZZC APPENDECTOMY  at age 14     Social History:  Patient reports that she has never smoked. She has never been exposed to tobacco smoke. She has never used smokeless tobacco. She reports that she does not currently use alcohol. She reports that she does not use drugs.    Family History:  Family History   Problem Relation Age of Onset    Breast Cancer Mother     Gastrointestinal Disease Mother     Heart Disease Father 57    Hypertension Maternal Grandmother     Substance Abuse Maternal Grandfather     Diabetes Maternal Grandfather     Diabetes Paternal Grandmother     Heart Disease Paternal Grandfather     Cancer Sister     Skin Cancer Sister     Substance Abuse Maternal Uncle     Substance Abuse Maternal Uncle     Suicide Niece     Suicide Niece     Anesthesia Reaction No family hx of     Thrombosis No family hx of         Medications:  Current Facility-Administered Medications   Medication Dose Route Frequency Provider Last Rate Last Admin    acetaminophen (TYLENOL) tablet 650 mg  650 mg Oral Q6H PRN Fab Velazquez MD   650 mg at 07/01/24 0640    aspirin (ASA) chewable tablet 81 mg  81 mg Oral Daily Miguelina Weinstein APRN CNP   81 mg at 07/01/24 0918    methocarbamol (ROBAXIN) tablet 500 mg  500 mg Oral 4x Daily PRN Fab Velazquez MD        mycophenolate (GENERIC EQUIVALENT) capsule 750 mg  750 mg Oral BID Miguelina Weinstein APRN CNP   750 mg at 07/01/24 0918    naloxone (NARCAN) injection 0.2 mg  0.2 mg Intravenous Q2 Min PRN Lorin Kellogg MD        Or    naloxone (NARCAN) injection 0.4 mg  0.4 mg Intravenous Q2 Min PRN Lorin Kellogg MD        Or    naloxone (NARCAN) injection 0.2 mg  0.2 mg Intramuscular Q2 Min PRN Lorin Kellogg MD        Or    naloxone (NARCAN) injection 0.4 mg  0.4 mg Intramuscular Q2 Min PRN Lorin Kellogg MD        oxyCODONE (ROXICODONE) tablet 5 mg  5 mg Oral or Feeding Tube Q6H PRN Fab Velazquez MD   5 mg at 06/30/24 2318    piperacillin-tazobactam (ZOSYN) 4.5 g vial to attach to  mL bag  4.5 g Intravenous Q6H Soledad Gomez MD 0 mL/hr at 06/30/24 2033 4.5 g at 07/01/24 0639    predniSONE (DELTASONE) tablet 5 mg  5 mg Oral Daily Miguelina Weinstein APRN CNP   5 mg at 07/01/24 0918    sodium chloride (PF) 0.9% PF flush 3 mL  3 mL Intracatheter q1 min prn Soledad Gomez MD        sodium chloride (PF) 0.9% PF flush 3 mL  3 mL Intracatheter Q8H Soledad Gomez MD   3 mL at 06/30/24 1855    sodium chloride 0.9 % infusion   Intravenous Continuous Chloé Russell  mL/hr at 06/30/24 2253 New Bag at 06/30/24 2253    tacrolimus (GENERIC EQUIVALENT) capsule 1.5 mg  1.5 mg Oral BID IS Miguelina Weinstein APRN CNP   1.5 mg at 07/01/24 0918    traZODone (DESYREL) half-tab 25-50 mg  25-50 mg Oral At Bedtime Fab Velazquez MD        Or    traZOGilbertoe  (DESYREL) tablet 50 mg  50 mg Oral At Bedtime Fab Velazquez MD   50 mg at 06/30/24 2317    valGANciclovir (VALCYTE) tablet 450 mg  450 mg Oral Daily Miguelina Weinstein APRN CNP   450 mg at 07/01/24 0918    vancomycin (VANCOCIN) 1,000 mg in 200 mL dextrose intermittent infusion  1,000 mg Intravenous Q12H Soledad Gomez  mL/hr at 07/01/24 0226 1,000 mg at 07/01/24 0226     Facility-Administered Medications Ordered in Other Encounters   Medication Dose Route Frequency Provider Last Rate Last Admin    hyoscyamine (LEVSIN/SL) sublingual tablet 125 mcg  125 mcg Sublingual Q4H PRN Farhan Schilling MD              Allergies   Allergen Reactions    Fish Oil      Redness and itching around eye area only - went away when fish oil capsules stopped     Metronidazole      pain/itching    Ppd [Tuberculin Purified Protein Derivative]     Sulfa Antibiotics      hives    Terbinafine And Related      Lamisil = mild urticarial reaction     Review of Systems:  As above.     Physical exam:  Vitals: /62 (BP Location: Left arm)   Pulse 93   Temp 98.4  F (36.9  C) (Oral)   Resp 16   LMP 05/31/2006   SpO2 98%   GEN: This is a well developed, well-nourished female in no acute distress, in a pleasant mood.    SKIN: Total skin excluding the undergarment areas was performed. The exam included the head/face, neck, both arms, chest, back, abdomen, both legs, digits and/or nails.   -Mcmullen skin type: II  - Well demarcated, symmetric v-shaped, red erythematous plaques involving the inguinal folds, thighs, axilla, antecubital fossae, inframammary folds, axillae, and occipital neck   -No other lesions of concern on areas examined.                                 Laboratory:  Results for orders placed or performed during the hospital encounter of 06/30/24 (from the past 24 hour(s))   Lactic Acid Whole Blood w/ 1x repeat in 2 hrs when >2   Result Value Ref Range    Lactic Acid, Initial 1.2 0.7 - 2.0 mmol/L   Comprehensive  metabolic panel   Result Value Ref Range    Sodium 128 (L) 135 - 145 mmol/L    Potassium 4.5 3.4 - 5.3 mmol/L    Carbon Dioxide (CO2) 21 (L) 22 - 29 mmol/L    Anion Gap 10 7 - 15 mmol/L    Urea Nitrogen 10.5 8.0 - 23.0 mg/dL    Creatinine 0.70 0.51 - 0.95 mg/dL    GFR Estimate >90 >60 mL/min/1.73m2    Calcium 8.2 (L) 8.6 - 10.0 mg/dL    Chloride 97 (L) 98 - 107 mmol/L    Glucose 148 (H) 70 - 99 mg/dL    Alkaline Phosphatase 140 40 - 150 U/L    AST 25 0 - 45 U/L     (H) 0 - 50 U/L    Protein Total 5.7 (L) 6.4 - 8.3 g/dL    Albumin 3.1 (L) 3.5 - 5.2 g/dL    Bilirubin Total 1.8 (H) <=1.2 mg/dL   Blood gas venous   Result Value Ref Range    pH Venous 7.02 (LL) 7.32 - 7.43    pCO2 Venous 63 (H) 40 - 50 mm Hg    pO2 Venous 100 (H) 25 - 47 mm Hg    Bicarbonate Venous 16 (L) 21 - 28 mmol/L    Base Excess/Deficit Venous -14.6 (L) -3.0 - 3.0 mmol/L    FIO2 21     Oxyhemoglobin Venous 91 (H) 70 - 75 %    O2 Sat, Venous 94.6 (H) 70.0 - 75.0 %    Narrative    In healthy individuals, oxyhemoglobin (O2Hb) and oxygen saturation (SO2) are approximately equal. In the presence of dyshemoglobins, oxyhemoglobin can be considerably lower than oxygen saturation.   Lipase   Result Value Ref Range    Lipase 24 13 - 60 U/L   Procalcitonin   Result Value Ref Range    Procalcitonin 0.71 (H) <0.50 ng/mL   Blood Culture Peripheral Blood    Specimen: Peripheral Blood   Result Value Ref Range    Culture No growth after 12 hours    [place] Draw    Narrative    The following orders were created for panel order [place] Draw.  Procedure                               Abnormality         Status                     ---------                               -----------         ------                     Extra Blue Top Tube[[id number]]                              Final result                 Please view results for these tests on the individual orders.   Extra Blue Top Tube   Result Value Ref Range    Hold Specimen JI    XR Chest 2 Views    Narrative     EXAM: XR CHEST 2 VIEWS  6/30/2024 11:24 AM     HISTORY:  Fever       COMPARISON:  Chest radiograph 6/23/2024, chest CT 6/20/2024    FINDINGS:   Asymmetrically elevated right hemidiaphragm. Trachea is midline.  Cardiac silhouette is within normal limits. Pulmonary vasculature is  distinct. Streaky right perihilar/basilar opacities. Trace bilateral  pleural effusions. No pneumothorax. Upper abdominal surgical clips.         Impression    IMPRESSION:   1. Asymmetrically elevated right hemidiaphragm, increased compared to  prior exam with associated streaky right perihilar/basilar opacities,  favored to represent atelectasis.  2. Trace bilateral pleural effusions.    I have personally reviewed the examination and initial interpretation  and I agree with the findings.    DEBBY ORTEGA DO         SYSTEM ID:  [id number]   iStat Gases (lactate) venous, POCT   Result Value Ref Range    Lactic Acid POCT 1.1 <=2.0 mmol/L    Bicarbonate Venous POCT 18 (L) 21 - 28 mmol/L    O2 Sat, Venous POCT 89 (H) 70 - 75 %    pCO2 Venous POCT 68 (H) 40 - 50 mm Hg    pH Venous POCT 7.03 (LL) 7.32 - 7.43    pO2 Venous POCT 83 (H) 25 - 47 mm Hg    Base Excess/Deficit (+/-) POCT -13.0 (L) -3.0 - 3.0 mmol/L   Blood Culture Arm, Right    Specimen: Arm, Right; Blood   Result Value Ref Range    Culture No growth after 12 hours    CBC with platelets differential    Narrative    The following orders were created for panel order CBC with platelets differential.  Procedure                               Abnormality         Status                     ---------                               -----------         ------                     CBC with platelets and d...[[id number]]  Abnormal            Final result               RBC and Platelet Morphology[[id number]]  Abnormal            Final result                 Please view results for these tests on the individual orders.   CBC with platelets and differential   Result Value Ref Range    WBC Count 26.0 (H)  4.0 - 11.0 10e3/uL    RBC Count 2.71 (L) 3.80 - 5.20 10e6/uL    Hemoglobin 8.9 (L) 11.7 - 15.7 g/dL    Hematocrit 26.7 (L) 35.0 - 47.0 %    MCV 99 78 - 100 fL    MCH 32.8 26.5 - 33.0 pg    MCHC 33.3 31.5 - 36.5 g/dL    RDW 18.1 (H) 10.0 - 15.0 %    Platelet Count 171 150 - 450 10e3/uL    % Neutrophils 91 %    % Lymphocytes 1 %    % Monocytes 1 %    % Eosinophils 3 %    % Basophils 0 %    % Immature Granulocytes 4 %    NRBCs per 100 WBC 0 <1 /100    Absolute Neutrophils 23.6 (H) 1.6 - 8.3 10e3/uL    Absolute Lymphocytes 0.2 (L) 0.8 - 5.3 10e3/uL    Absolute Monocytes 0.4 0.0 - 1.3 10e3/uL    Absolute Eosinophils 0.7 0.0 - 0.7 10e3/uL    Absolute Basophils 0.0 0.0 - 0.2 10e3/uL    Absolute Immature Granulocytes 1.1 (H) <=0.4 10e3/uL    Absolute NRBCs 0.0 10e3/uL   RBC and Platelet Morphology   Result Value Ref Range    Platelet Assessment  Automated Count Confirmed. Platelet morphology is normal.     Automated Count Confirmed. Platelet morphology is normal.    Acanthocytes      Eliazar Rods      Basophilic Stippling      Bite Cells      Blister Cells      Filemon Cells      Elliptocytes      Hgb C Crystals      Whiting-Jolly Bodies      Hypersegmented Neutrophils      Polychromasia Slight (A) None Seen    RBC agglutination      RBC Fragments      Reactive Lymphocytes      Rouleaux      Sickle Cells      Smudge Cells      Spherocytes      Stomatocytes      Target Cells      Teardrop Cells      Toxic Neutrophils      RBC Morphology Confirmed RBC Indices    CT Abdomen Pelvis w Contrast    Narrative    EXAM: CT abdomen and pelvis with intravenous contrast. 6/30/2024 1:12  PM    HISTORY: recent liver transplant, fever       TECHNIQUE: Helical acquisition of image data was performed for the  abdomen and pelvis with intravenous contrast.    COMPARISON: CT 6/20/2024, MR 6/12/2024, ultrasound 6/23/2024    FINDINGS:  Lower thorax: Small to moderate right-sided pleural effusion with  overlying segmental atelectasis and mild adjacent  ground glass  density. No suspicious nodules or masses. Borderline cardiomegaly. No  pericardial effusion. Normal esophagus.    Hepatobiliary: Post surgical changes of liver transplant. Homogeneous  parenchymal enhancement. Mild intrahepatic biliary ductal dilatation  and periportal edema. Common bile duct stent in place. The gallbladder  is surgically absent.    Pancreas: The pancreatic duct is not dilated.    Spleen: The spleen is not enlarged.    Adrenal glands: No adrenal nodules.    Kidneys/ureters: No hydronephrosis. No renal calculi.    Bladder/pelvic organs: Unremarkable.    Bowel/mesentery: No dilated loops of small bowel or colon. The  appendix is unremarkable.    Stomach: Unremarkable.    Peritoneum/retroperitoneum: No extraluminal bowel gas. Small amount of  simple density free fluid lateral to the right inferior liver. 6.8 x  2.4 cm heterogeneous collection medial to the right hemiliver  consistent with resorbing hematoma seen on ultrasound 6/23/2024.  Additional fluid in the gallbladder fossa. Ascites in the pelvis  contains layering hyperdensity consistent with small-volume  postoperative hemoperitoneum/hematoma, series 4 image 430. No  rim-enhancing or air-containing collections suspicious for abscess.    Lymph nodes: No enlarged abdominal or pelvic lymph nodes by short axis  criteria.    Major vessels: No aneurysmal dilatation. Sequelae of previous portal  venous hypertension noted with perisplenic collaterals in the left  upper quadrant and splenorenal shunting.    Bones/soft tissues: Degenerative changes of the spine. No acute or  suspicious osseous abnormality. Subcutaneous edema in the right flank  with 5.4 x 1.5 cm seroma/liquefying hematoma. Skin staples in the  epigastrium. Mild body wall edema.        Impression    IMPRESSION:  1. Small to moderate right pleural effusion with overlying atelectasis  and adjacent groundglass density. Cannot exclude a superimposed  pneumonia.  2. Post surgical  changes of liver transplant with reabsorbing hematoma  medial to the right hemiliver.  3. Small volume ascites. There is small volume layering postoperative  hemoperitoneum in the pelvis.    I have personally reviewed the examination and initial interpretation  and I agree with the findings.    YOLI REYNA MD         SYSTEM ID:  [id number]   UA with Microscopic   Result Value Ref Range    Color Urine Yellow Colorless, Straw, Light Yellow, Yellow    Appearance Urine Clear Clear    Glucose Urine Negative Negative mg/dL    Bilirubin Urine Negative Negative    Ketones Urine Negative Negative mg/dL    Specific Gravity Urine 1.018 1.003 - 1.035    Blood Urine Negative Negative    pH Urine 8.0 (H) 5.0 - 7.0    Protein Albumin Urine 10 (A) Negative mg/dL    Urobilinogen Urine Normal Normal, 2.0 mg/dL    Nitrite Urine Negative Negative    Leukocyte Esterase Urine Small (A) Negative    RBC Urine <1 <=2 /HPF    WBC Urine 4 <=5 /HPF    Squamous Epithelials Urine 4 (H) <=1 /HPF   Blood gas arterial   Result Value Ref Range    pH Arterial 7.48 (H) 7.35 - 7.45    pCO2 Arterial 31 (L) 35 - 45 mm Hg    pO2 Arterial 77 (L) 80 - 105 mm Hg    FIO2 21     Bicarbonate Arterial 23 21 - 28 mmol/L    Base Excess/Deficit Arterial -0.5 -3.0 - 3.0 mmol/L    Kalen's Test Yes     Oxyhemoglobin Arterial 94 92 - 100 %    O2 Sat, Arterial 97.3 (H) 96.0 - 97.0 %    Narrative    In healthy individuals, oxyhemoglobin (O2Hb) and oxygen saturation (SO2) are approximately equal. In the presence of dyshemoglobins, oxyhemoglobin can be considerably lower than oxygen saturation.   Lactic acid whole blood   Result Value Ref Range    Lactic Acid 1.2 0.7 - 2.0 mmol/L   Comprehensive metabolic panel   Result Value Ref Range    Sodium 130 (L) 135 - 145 mmol/L    Potassium 4.6 3.4 - 5.3 mmol/L    Carbon Dioxide (CO2) 21 (L) 22 - 29 mmol/L    Anion Gap 7 7 - 15 mmol/L    Urea Nitrogen 8.6 8.0 - 23.0 mg/dL    Creatinine 0.63 0.51 - 0.95 mg/dL    GFR Estimate  >90 >60 mL/min/1.73m2    Calcium 7.7 (L) 8.6 - 10.0 mg/dL    Chloride 102 98 - 107 mmol/L    Glucose 115 (H) 70 - 99 mg/dL    Alkaline Phosphatase 121 40 - 150 U/L    AST 22 0 - 45 U/L    ALT 93 (H) 0 - 50 U/L    Protein Total 5.1 (L) 6.4 - 8.3 g/dL    Albumin 2.8 (L) 3.5 - 5.2 g/dL    Bilirubin Total 1.7 (H) <=1.2 mg/dL   CBC with Platelets & Differential    Narrative    The following orders were created for panel order CBC with Platelets & Differential.  Procedure                               Abnormality         Status                     ---------                               -----------         ------                     CBC with platelets and d...[[id number]]  Abnormal            Final result                 Please view results for these tests on the individual orders.   Magnesium   Result Value Ref Range    Magnesium 1.4 (L) 1.7 - 2.3 mg/dL   Phosphorus   Result Value Ref Range    Phosphorus 2.7 2.5 - 4.5 mg/dL   CBC with platelets and differential   Result Value Ref Range    WBC Count 24.7 (H) 4.0 - 11.0 10e3/uL    RBC Count 2.62 (L) 3.80 - 5.20 10e6/uL    Hemoglobin 8.6 (L) 11.7 - 15.7 g/dL    Hematocrit 25.6 (L) 35.0 - 47.0 %    MCV 98 78 - 100 fL    MCH 32.8 26.5 - 33.0 pg    MCHC 33.6 31.5 - 36.5 g/dL    RDW 18.2 (H) 10.0 - 15.0 %    Platelet Count 197 150 - 450 10e3/uL    % Neutrophils 91 %    % Lymphocytes 2 %    % Monocytes 1 %    % Eosinophils 3 %    % Basophils 0 %    % Immature Granulocytes 3 %    NRBCs per 100 WBC 0 <1 /100    Absolute Neutrophils 22.8 (H) 1.6 - 8.3 10e3/uL    Absolute Lymphocytes 0.4 (L) 0.8 - 5.3 10e3/uL    Absolute Monocytes 0.2 0.0 - 1.3 10e3/uL    Absolute Eosinophils 0.6 0.0 - 0.7 10e3/uL    Absolute Basophils 0.0 0.0 - 0.2 10e3/uL    Absolute Immature Granulocytes 0.7 (H) <=0.4 10e3/uL    Absolute NRBCs 0.0 10e3/uL   C. difficile Toxin B PCR with reflex to C. difficile Antigen and Toxins A/B EIA    Specimen: Per Rectum; Stool   Result Value Ref Range    C Difficile Toxin B  by PCR Negative Negative    Narrative    The [employer] Xpert C. difficile Assay, performed on the [employer], is a qualitative in vitro diagnostic test for rapid detection of toxin B gene sequences from unformed (liquid or soft) stool specimens collected from patients suspected of having Clostridioides difficile infection (CDI). The test utilizes automated real-time polymerase chain reaction (PCR) to detect toxin gene sequences associated with toxin producing C. difficile. The Xpert C. difficile Assay is intended as an aid in the diagnosis of CDI.   CBC with platelets   Result Value Ref Range    WBC Count 23.3 (H) 4.0 - 11.0 10e3/uL    RBC Count 2.75 (L) 3.80 - 5.20 10e6/uL    Hemoglobin 9.0 (L) 11.7 - 15.7 g/dL    Hematocrit 27.3 (L) 35.0 - 47.0 %    MCV 99 78 - 100 fL    MCH 32.7 26.5 - 33.0 pg    MCHC 33.0 31.5 - 36.5 g/dL    RDW 18.4 (H) 10.0 - 15.0 %    Platelet Count 230 150 - 450 10e3/uL   Comprehensive metabolic panel   Result Value Ref Range    Sodium 128 (L) 135 - 145 mmol/L    Potassium 4.1 3.4 - 5.3 mmol/L    Carbon Dioxide (CO2) 20 (L) 22 - 29 mmol/L    Anion Gap 9 7 - 15 mmol/L    Urea Nitrogen 7.8 (L) 8.0 - 23.0 mg/dL    Creatinine 0.68 0.51 - 0.95 mg/dL    GFR Estimate >90 >60 mL/min/1.73m2    Calcium 7.5 (L) 8.6 - 10.0 mg/dL    Chloride 99 98 - 107 mmol/L    Glucose 109 (H) 70 - 99 mg/dL    Alkaline Phosphatase 123 40 - 150 U/L    AST 18 0 - 45 U/L    ALT 78 (H) 0 - 50 U/L    Protein Total 5.1 (L) 6.4 - 8.3 g/dL    Albumin 2.8 (L) 3.5 - 5.2 g/dL    Bilirubin Total 1.8 (H) <=1.2 mg/dL   Magnesium   Result Value Ref Range    Magnesium 1.9 1.7 - 2.3 mg/dL   Phosphorus   Result Value Ref Range    Phosphorus 2.7 2.5 - 4.5 mg/dL     *Note: Due to a large number of results and/or encounters for the requested time period, some results have not been displayed. A complete set of results can be found in Results Review.     Dr. Martínez staffed the patient.    Staff  Involved:  Resident/Staff    I, Lorraine Martínez MD, saw this patient with the resident and agree with the resident s findings and plan of care as documented in the resident s note.

## 2024-07-01 NOTE — PLAN OF CARE
/57 (BP Location: Left arm)   Pulse 102   Temp 98.4  F (36.9  C) (Oral)   Resp 14   LMP 05/31/2006   SpO2 98%     Shift: 3009-0663  VS: tachycardic in low 100s now in 90s on RA, afebrile  Neuro: AOx4  BG: none  Labs: C diff negative, BC pending; mag replaced with 2g IV  Cardiac: NSR  Respiratory: WNL, denies SOB  Pain/Nausea/PRN: denies nausea, oxy x1  and tylenol x1 for abdominal pain, slept through the night  Diet: regular  LDA: PIV infusing NS @ 100, Zosyn Q6 and Vanc Q12  GI/: LBM yesterday evening (soft)  Skin: rash on upper arms, entirety of back, abdomen and groin area; borders marked last evening; rash is not painful or itchy  Mobility: SBA with walker  Plan: continue POC    Handoff given to following RN.

## 2024-07-01 NOTE — PROGRESS NOTES
Patient evaluated at bedside. She is awake, alert, interacting appropriately. Previously thought to need ICU due to acidosis with pH 7.03. Her most recent ABG ~ 1 hour prior 7.48/31/77/23. Also has recently resulted normal LA of 1.2. CBC with persistent leukocytosis, but down trending. Afebrile with HR 90s and SBP 110s. Breathing unlabored on room air without tachypnea, SpO2 100%. Given 1L LR upon arrival, but has not had any IVF in several hours. She has cellulitis on her RUE without crepitus, bullae, tenderness.    Overall she is stable without an indication for ICU admission. She will be admitted to the floor.    Discussed with transplant fellow, Dr. Gandhi and ICU staff, Dr. Moreau.     Chloé Russell, DO  PGY-2 General Surgery

## 2024-07-02 LAB
ALBUMIN SERPL BCG-MCNC: 3 G/DL (ref 3.5–5.2)
ALP SERPL-CCNC: 139 U/L (ref 40–150)
ALT SERPL W P-5'-P-CCNC: 61 U/L (ref 0–50)
ANION GAP SERPL CALCULATED.3IONS-SCNC: 11 MMOL/L (ref 7–15)
AST SERPL W P-5'-P-CCNC: 22 U/L (ref 0–45)
BASOPHILS # BLD AUTO: 0.1 10E3/UL (ref 0–0.2)
BASOPHILS NFR BLD AUTO: 0 %
BILIRUB DIRECT SERPL-MCNC: 1.08 MG/DL (ref 0–0.3)
BILIRUB SERPL-MCNC: 2.1 MG/DL
BUN SERPL-MCNC: 6.9 MG/DL (ref 8–23)
CALCIUM SERPL-MCNC: 7.2 MG/DL (ref 8.8–10.2)
CHLORIDE SERPL-SCNC: 101 MMOL/L (ref 98–107)
CREAT SERPL-MCNC: 0.63 MG/DL (ref 0.51–0.95)
DEPRECATED HCO3 PLAS-SCNC: 17 MMOL/L (ref 22–29)
EGFRCR SERPLBLD CKD-EPI 2021: >90 ML/MIN/1.73M2
EOSINOPHIL # BLD AUTO: 1 10E3/UL (ref 0–0.7)
EOSINOPHIL NFR BLD AUTO: 3 %
ERYTHROCYTE [DISTWIDTH] IN BLOOD BY AUTOMATED COUNT: 18.7 % (ref 10–15)
GLUCOSE SERPL-MCNC: 170 MG/DL (ref 70–99)
HCT VFR BLD AUTO: 27.2 % (ref 35–47)
HGB BLD-MCNC: 8.9 G/DL (ref 11.7–15.7)
IMM GRANULOCYTES # BLD: 1.2 10E3/UL
IMM GRANULOCYTES NFR BLD: 4 %
LYMPHOCYTES # BLD AUTO: 1 10E3/UL (ref 0.8–5.3)
LYMPHOCYTES NFR BLD AUTO: 3 %
MAGNESIUM SERPL-MCNC: 1.6 MG/DL (ref 1.7–2.3)
MCH RBC QN AUTO: 32.8 PG (ref 26.5–33)
MCHC RBC AUTO-ENTMCNC: 32.7 G/DL (ref 31.5–36.5)
MCV RBC AUTO: 100 FL (ref 78–100)
MONOCYTES # BLD AUTO: 0.6 10E3/UL (ref 0–1.3)
MONOCYTES NFR BLD AUTO: 2 %
NEUTROPHILS # BLD AUTO: 27 10E3/UL (ref 1.6–8.3)
NEUTROPHILS NFR BLD AUTO: 88 %
NRBC # BLD AUTO: 0 10E3/UL
NRBC BLD AUTO-RTO: 0 /100
PHOSPHATE SERPL-MCNC: 1.5 MG/DL (ref 2.5–4.5)
PLATELET # BLD AUTO: 306 10E3/UL (ref 150–450)
POTASSIUM SERPL-SCNC: 3.7 MMOL/L (ref 3.4–5.3)
PROT SERPL-MCNC: 5.5 G/DL (ref 6.4–8.3)
RBC # BLD AUTO: 2.71 10E6/UL (ref 3.8–5.2)
SODIUM SERPL-SCNC: 129 MMOL/L (ref 135–145)
VANCOMYCIN SERPL-MCNC: 18.8 UG/ML
WBC # BLD AUTO: 30.8 10E3/UL (ref 4–11)

## 2024-07-02 PROCEDURE — 83735 ASSAY OF MAGNESIUM: CPT | Performed by: NURSE PRACTITIONER

## 2024-07-02 PROCEDURE — 82310 ASSAY OF CALCIUM: CPT | Performed by: NURSE PRACTITIONER

## 2024-07-02 PROCEDURE — 99223 1ST HOSP IP/OBS HIGH 75: CPT | Mod: 24 | Performed by: INTERNAL MEDICINE

## 2024-07-02 PROCEDURE — 120N000011 HC R&B TRANSPLANT UMMC

## 2024-07-02 PROCEDURE — 250N000011 HC RX IP 250 OP 636: Performed by: PHYSICIAN ASSISTANT

## 2024-07-02 PROCEDURE — 250N000013 HC RX MED GY IP 250 OP 250 PS 637

## 2024-07-02 PROCEDURE — 80069 RENAL FUNCTION PANEL: CPT | Performed by: NURSE PRACTITIONER

## 2024-07-02 PROCEDURE — 258N000003 HC RX IP 258 OP 636

## 2024-07-02 PROCEDURE — 85025 COMPLETE CBC W/AUTO DIFF WBC: CPT | Performed by: NURSE PRACTITIONER

## 2024-07-02 PROCEDURE — 36415 COLL VENOUS BLD VENIPUNCTURE: CPT | Performed by: PSYCHIATRY & NEUROLOGY

## 2024-07-02 PROCEDURE — 250N000012 HC RX MED GY IP 250 OP 636 PS 637: Performed by: NURSE PRACTITIONER

## 2024-07-02 PROCEDURE — 250N000013 HC RX MED GY IP 250 OP 250 PS 637: Performed by: NURSE PRACTITIONER

## 2024-07-02 PROCEDURE — 36415 COLL VENOUS BLD VENIPUNCTURE: CPT | Performed by: NURSE PRACTITIONER

## 2024-07-02 PROCEDURE — 250N000011 HC RX IP 250 OP 636: Performed by: NURSE PRACTITIONER

## 2024-07-02 PROCEDURE — 83605 ASSAY OF LACTIC ACID: CPT | Performed by: PSYCHIATRY & NEUROLOGY

## 2024-07-02 PROCEDURE — 80202 ASSAY OF VANCOMYCIN: CPT | Performed by: NURSE PRACTITIONER

## 2024-07-02 PROCEDURE — 999N000127 HC STATISTIC PERIPHERAL IV START W US GUIDANCE

## 2024-07-02 PROCEDURE — 99232 SBSQ HOSP IP/OBS MODERATE 35: CPT | Mod: GC | Performed by: DERMATOLOGY

## 2024-07-02 PROCEDURE — 82040 ASSAY OF SERUM ALBUMIN: CPT | Performed by: NURSE PRACTITIONER

## 2024-07-02 PROCEDURE — 250N000011 HC RX IP 250 OP 636: Performed by: STUDENT IN AN ORGANIZED HEALTH CARE EDUCATION/TRAINING PROGRAM

## 2024-07-02 RX ORDER — SODIUM CHLORIDE 9 MG/ML
INJECTION, SOLUTION INTRAVENOUS
Status: COMPLETED
Start: 2024-07-02 | End: 2024-07-02

## 2024-07-02 RX ORDER — MAGNESIUM SULFATE HEPTAHYDRATE 40 MG/ML
2 INJECTION, SOLUTION INTRAVENOUS ONCE
Status: COMPLETED | OUTPATIENT
Start: 2024-07-02 | End: 2024-07-02

## 2024-07-02 RX ORDER — HEPARIN SODIUM 5000 [USP'U]/.5ML
5000 INJECTION, SOLUTION INTRAVENOUS; SUBCUTANEOUS EVERY 8 HOURS
Status: DISCONTINUED | OUTPATIENT
Start: 2024-07-02 | End: 2024-07-06 | Stop reason: HOSPADM

## 2024-07-02 RX ORDER — SODIUM BICARBONATE 650 MG/1
650 TABLET ORAL 2 TIMES DAILY
Status: DISCONTINUED | OUTPATIENT
Start: 2024-07-02 | End: 2024-07-05

## 2024-07-02 RX ORDER — TRIAMCINOLONE ACETONIDE 1 MG/G
OINTMENT TOPICAL 2 TIMES DAILY
Status: DISCONTINUED | OUTPATIENT
Start: 2024-07-02 | End: 2024-07-06 | Stop reason: HOSPADM

## 2024-07-02 RX ADMIN — SODIUM PHOSPHATE, DIBASIC, ANHYDROUS, POTASSIUM PHOSPHATE, MONOBASIC, AND SODIUM PHOSPHATE, MONOBASIC, MONOHYDRATE 500 MG: 852; 155; 130 TABLET, COATED ORAL at 20:23

## 2024-07-02 RX ADMIN — ASPIRIN 81 MG 81 MG: 81 TABLET ORAL at 08:28

## 2024-07-02 RX ADMIN — TACROLIMUS 2 MG: 1 CAPSULE ORAL at 18:02

## 2024-07-02 RX ADMIN — ACETAMINOPHEN 650 MG: 325 TABLET, FILM COATED ORAL at 15:56

## 2024-07-02 RX ADMIN — PIPERACILLIN AND TAZOBACTAM 4.5 G: 4; .5 INJECTION, POWDER, LYOPHILIZED, FOR SOLUTION INTRAVENOUS at 18:03

## 2024-07-02 RX ADMIN — METHOCARBAMOL 500 MG: 500 TABLET ORAL at 18:03

## 2024-07-02 RX ADMIN — VANCOMYCIN HYDROCHLORIDE 1000 MG: 1 INJECTION, SOLUTION INTRAVENOUS at 01:41

## 2024-07-02 RX ADMIN — TACROLIMUS 2 MG: 1 CAPSULE ORAL at 08:27

## 2024-07-02 RX ADMIN — HEPARIN SODIUM 5000 UNITS: 5000 INJECTION, SOLUTION INTRAVENOUS; SUBCUTANEOUS at 13:15

## 2024-07-02 RX ADMIN — TRAZODONE HYDROCHLORIDE 50 MG: 50 TABLET ORAL at 22:46

## 2024-07-02 RX ADMIN — ACETAMINOPHEN 650 MG: 325 TABLET, FILM COATED ORAL at 08:28

## 2024-07-02 RX ADMIN — HEPARIN SODIUM 5000 UNITS: 5000 INJECTION, SOLUTION INTRAVENOUS; SUBCUTANEOUS at 20:23

## 2024-07-02 RX ADMIN — PANTOPRAZOLE SODIUM 40 MG: 40 TABLET, DELAYED RELEASE ORAL at 08:28

## 2024-07-02 RX ADMIN — ACETAMINOPHEN 650 MG: 325 TABLET, FILM COATED ORAL at 20:23

## 2024-07-02 RX ADMIN — TRIAMCINOLONE ACETONIDE: 1 OINTMENT TOPICAL at 20:28

## 2024-07-02 RX ADMIN — PREDNISONE 5 MG: 5 TABLET ORAL at 08:28

## 2024-07-02 RX ADMIN — URSODIOL 300 MG: 300 CAPSULE ORAL at 20:23

## 2024-07-02 RX ADMIN — SODIUM BICARBONATE 650 MG TABLET 650 MG: at 09:44

## 2024-07-02 RX ADMIN — MYCOPHENOLATE MOFETIL 750 MG: 250 CAPSULE ORAL at 08:28

## 2024-07-02 RX ADMIN — VANCOMYCIN HYDROCHLORIDE 1000 MG: 1 INJECTION, SOLUTION INTRAVENOUS at 14:45

## 2024-07-02 RX ADMIN — SODIUM CHLORIDE: 9 INJECTION, SOLUTION INTRAVENOUS at 11:35

## 2024-07-02 RX ADMIN — METHOCARBAMOL 500 MG: 500 TABLET ORAL at 13:27

## 2024-07-02 RX ADMIN — MYCOPHENOLATE MOFETIL 750 MG: 250 CAPSULE ORAL at 20:23

## 2024-07-02 RX ADMIN — PIPERACILLIN AND TAZOBACTAM 4.5 G: 4; .5 INJECTION, POWDER, LYOPHILIZED, FOR SOLUTION INTRAVENOUS at 06:20

## 2024-07-02 RX ADMIN — MAGNESIUM OXIDE TAB 400 MG (241.3 MG ELEMENTAL MG) 400 MG: 400 (241.3 MG) TAB at 13:15

## 2024-07-02 RX ADMIN — VALGANCICLOVIR HYDROCHLORIDE 450 MG: 450 TABLET ORAL at 08:28

## 2024-07-02 RX ADMIN — MAGNESIUM SULFATE HEPTAHYDRATE 2 G: 2 INJECTION, SOLUTION INTRAVENOUS at 15:56

## 2024-07-02 RX ADMIN — DAPSONE 50 MG: 25 TABLET ORAL at 08:28

## 2024-07-02 RX ADMIN — SODIUM CHLORIDE 500 ML: 9 INJECTION, SOLUTION INTRAVENOUS at 23:48

## 2024-07-02 RX ADMIN — SODIUM BICARBONATE 650 MG TABLET 650 MG: at 20:23

## 2024-07-02 RX ADMIN — OXYCODONE HYDROCHLORIDE 5 MG: 5 TABLET ORAL at 20:23

## 2024-07-02 RX ADMIN — URSODIOL 300 MG: 300 CAPSULE ORAL at 08:28

## 2024-07-02 RX ADMIN — PIPERACILLIN AND TAZOBACTAM 4.5 G: 4; .5 INJECTION, POWDER, LYOPHILIZED, FOR SOLUTION INTRAVENOUS at 23:48

## 2024-07-02 RX ADMIN — PIPERACILLIN AND TAZOBACTAM 4.5 G: 4; .5 INJECTION, POWDER, LYOPHILIZED, FOR SOLUTION INTRAVENOUS at 00:19

## 2024-07-02 RX ADMIN — SODIUM PHOSPHATE, DIBASIC, ANHYDROUS, POTASSIUM PHOSPHATE, MONOBASIC, AND SODIUM PHOSPHATE, MONOBASIC, MONOHYDRATE 500 MG: 852; 155; 130 TABLET, COATED ORAL at 13:27

## 2024-07-02 RX ADMIN — PIPERACILLIN AND TAZOBACTAM 4.5 G: 4; .5 INJECTION, POWDER, LYOPHILIZED, FOR SOLUTION INTRAVENOUS at 13:15

## 2024-07-02 RX ADMIN — LORAZEPAM 0.25 MG: 2 INJECTION INTRAMUSCULAR; INTRAVENOUS at 00:13

## 2024-07-02 RX ADMIN — TRIAMCINOLONE ACETONIDE: 1 OINTMENT TOPICAL at 09:47

## 2024-07-02 NOTE — CODE/RAPID RESPONSE
Rapid Response Team Note    Assessment   A rapid response was called on Abiola Matute due to  Hyperlactatemia w/ LA 3.0 . This presentation is likely due to sepsis. Patient with recent liver transplant 6/22/24. Admit with fevers and rash, On broad spectrum abx and Dermatology consult. Patient with reports of feeling 'okay' today. No CP, cough, HA, abdominal pain, fever, chills.     Plan   - CBC, CMP, Procal, CRP  - 500cc IVF bolus x1  - Continue Broad spectrum abx  - BCx2, UA  - Repeat LA  -  The  Surgery  primary team was able to be reached and they are in agreement with the above plan.  -  Disposition: The patient will remain on the current unit. We will continue to monitor this patient closely.  -  Reassessment and plan follow-up will be performed by the primary team    Sariah Mcghee PA-C  Rapid Response Team [facility]  Securely message with [employer]     Medical Decision Making       30 MINUTES SPENT BY ME on the date of service doing chart review, history, exam, documentation & further activities per the note.          Hospital Course   Brief Summary of events leading to rapid response:   RRT called for LA 3.0.     Physical Exam   Vital Signs: Temp: 99.4  F (37.4  C) Temp src: Oral BP: 113/66 Pulse: 110   Resp: 18 SpO2: 92 % O2 Device: None (Room air)    Weight: 0 lbs 0 oz      Exam:     Physical Exam   Constitutional:   Well nourished, well developed, resting comfortably   HEENT:   Head: Normocephalic and atraumatic.   Eyes: Conjunctivae are normal. Pupils are equal, round, and reactive to light.  Pharynx has no erythema or exudate, mucous membranes are moist  Neck:   No adenopathy, no bony tenderness  Cardiovascular: Regular rate and rhythm without murmurs or gallops  Pulmonary/Chest: Clear to auscultation bilaterally, with no wheezes or retractions. No respiratory distress.  GI: Soft with good bowel sounds.  Non-tender, non-distended, with no guarding, no rebound, no peritoneal signs.   Back:  No bony or  "CVA tenderness   Musculoskeletal:  No edema or clubbing   Skin:Areas of swelling w/ erythema and warmth on bilateal UE, neck and back. Areas outliend  Neurological: Alert and oriented to person, place, and time. Nonfocal exam  Psychiatric:  Normal mood and affect.          Sepsis Evaluation   The patient is known to have an infection.    Vital signs, lab and physical exam findings constitute a diagnosis of SEVERE SEPSIS, based on:SIRS criteria met and Lactic acid resulted with a level > 2.0          Anti-infectives (From now, onward)      Start     Dose/Rate Route Frequency Ordered Stop    24 1400  dapsone (ACZONE) tablet 50 mg        Note to Pharmacy: PTA Si tablets (50 mg) by Oral or Feeding Tube route daily      50 mg Oral or Feeding Tube DAILY 24 1234      24 0800  valGANciclovir (VALCYTE) tablet 450 mg         450 mg Oral DAILY 24 1458      24 0130  vancomycin (VANCOCIN) 1,000 mg in 200 mL dextrose intermittent infusion         1,000 mg  200 mL/hr over 1 Hours Intravenous EVERY 12 HOURS 24 1315      24 1830  piperacillin-tazobactam (ZOSYN) 4.5 g vial to attach to  mL bag        Note to Pharmacy: For [facility], [facility] and [facility]: For Zosyn-naive patients, use the \"Zosyn initial dose + extended infusion\" order panel.    4.5 g  over 30 Minutes Intravenous EVERY 6 HOURS 24 1416            Current antibiotic coverage is appropriate for source of infection.    3 Hour Severe Sepsis Bundle Completion:  1. Initial Lactic Acid result shown above. Repeat lactic acid ordered by reflex for 2 hours from initial collection.  2. Blood Cultures: Ordered, to be drawn prior to antibiotics.  3. Broad Spectrum Antibiotics Administered: yes  4. Is hypotension present? No (IV fluid bolus NOT required). IV Fluid volume administered: 500cc, per Surgery recommendation     "

## 2024-07-02 NOTE — CONSULTS
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 The onset of the rash follows systemic exposure to a medication, with numerous possible culprits.   The latency period could range from hours to days after exposure and can even occur after cessation of medication. The pathogenesis is unknown however it is speculated that a T cell-mediated delayed-type hypersensitivity reaction may mitigate the eruption and that increased drug excretion on the skin folds, which have a high density of eccrine glands, could explain the distribution of the rash.  The patient has a few potential culprits including antibiotics, NSAIDs, opiates, hydroxyzine while hospitalized, she also received infliximab for her Crohn's  and had recent contrast, all of which could be implicated. Fortunately SDRIFE is typically self limited and symptomatic relief is mainstay. No indication for skin biopsy a this time. Her rash seems to be improving even though she hasn't started topicals, so we will plan to continue topicals and watch her closely for progression.    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 Please do not hesitate to contact the dermatology resident/faculty on call for any additional questions or concerns. We will continue to follow.     Staffed with attending physician, Dr. Landon Colindres, DO  Dermatology Resident     I have seen and examined this patient and agree with the assessment and plan as documented in the resident's note.    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    SKIN: Total skin excluding the undergarment areas was performed. The exam included the head/face, neck, both arms, chest, back, abdomen, both legs, digits and/or nails.   -Mcmullen skin type: II  - Well demarcated, symmetric, red erythematous patches involving the inguinal folds, thighs, axilla, antecubital fossae, inframammary folds, axillae, and occipital neck - less raised from exam 7/1/24   -No other lesions of concern on areas examined.                                     Laboratory:  Results for orders placed or performed during the hospital encounter of 06/30/24 (from the past 24 hour(s))   Lactic Acid Whole Blood w/ 1x repeat in 2 hrs when >2   Result Value Ref Range    Lactic Acid, Initial 3.0 (H) 0.7 - 2.0  mmol/L   Lactic acid whole blood   Result Value Ref Range    Lactic Acid 3.3 (H) 0.7 - 2.0 mmol/L   Blood Culture Arm, Right    Specimen: Arm, Right; Blood   Result Value Ref Range    Culture No growth after 12 hours    Blood Culture Arm, Left    Specimen: Arm, Left; Blood   Result Value Ref Range    Culture No growth after 12 hours    CBC with platelets   Result Value Ref Range    WBC Count 32.3 (H) 4.0 - 11.0 10e3/uL    RBC Count 2.44 (L) 3.80 - 5.20 10e6/uL    Hemoglobin 8.0 (L) 11.7 - 15.7 g/dL    Hematocrit 24.0 (L) 35.0 - 47.0 %    MCV 98 78 - 100 fL    MCH 32.8 26.5 - 33.0 pg    MCHC 33.3 31.5 - 36.5 g/dL    RDW 18.4 (H) 10.0 - 15.0 %    Platelet Count 271 150 - 450 10e3/uL   Comprehensive metabolic panel   Result Value Ref Range    Sodium 129 (L) 135 - 145 mmol/L    Potassium 3.5 3.4 - 5.3 mmol/L    Carbon Dioxide (CO2) 17 (L) 22 - 29 mmol/L    Anion Gap 11 7 - 15 mmol/L    Urea Nitrogen 8.9 8.0 - 23.0 mg/dL    Creatinine 0.66 0.51 - 0.95 mg/dL    GFR Estimate >90 >60 mL/min/1.73m2    Calcium 7.2 (L) 8.6 - 10.0 mg/dL    Chloride 101 98 - 107 mmol/L    Glucose 137 (H) 70 - 99 mg/dL    Alkaline Phosphatase 133 40 - 150 U/L    AST 17 0 - 45 U/L    ALT 67 (H) 0 - 50 U/L    Protein Total 5.2 (L) 6.4 - 8.3 g/dL    Albumin 2.8 (L) 3.5 - 5.2 g/dL    Bilirubin Total 1.8 (H) <=1.2 mg/dL   Procalcitonin   Result Value Ref Range    Procalcitonin 0.68 (H) <0.50 ng/mL   CRP inflammation   Result Value Ref Range    CRP Inflammation 145.00 (H) <5.00 mg/L   UA with Microscopic reflex to Culture    Specimen: Urine, Clean Catch   Result Value Ref Range    Color Urine Dark Yellow (A) Colorless, Straw, Light Yellow, Yellow    Appearance Urine Slightly Cloudy (A) Clear    Glucose Urine Negative Negative mg/dL    Bilirubin Urine Negative Negative    Ketones Urine Trace (A) Negative mg/dL    Specific Gravity Urine 1.020 1.003 - 1.035    Blood Urine Negative Negative    pH Urine 6.0 5.0 - 7.0    Protein Albumin Urine 50 (A) Negative  mg/dL    Urobilinogen Urine Normal Normal, 2.0 mg/dL    Nitrite Urine Negative Negative    Leukocyte Esterase Urine Trace (A) Negative    RBC Urine 0 <=2 /HPF    WBC Urine 11 (H) <=5 /HPF    Squamous Epithelials Urine 4 (H) <=1 /HPF    Narrative    Urine Culture ordered based on laboratory criteria   Lactic acid whole blood   Result Value Ref Range    Lactic Acid 2.0 0.7 - 2.0 mmol/L   Vancomycin level   Result Value Ref Range    Vancomycin 18.8   ug/mL   Basic metabolic panel   Result Value Ref Range    Sodium 129 (L) 135 - 145 mmol/L    Potassium 3.7 3.4 - 5.3 mmol/L    Chloride 101 98 - 107 mmol/L    Carbon Dioxide (CO2) 17 (L) 22 - 29 mmol/L    Anion Gap 11 7 - 15 mmol/L    Urea Nitrogen 6.9 (L) 8.0 - 23.0 mg/dL    Creatinine 0.63 0.51 - 0.95 mg/dL    GFR Estimate >90 >60 mL/min/1.73m2    Calcium 7.2 (L) 8.8 - 10.2 mg/dL    Glucose 170 (H) 70 - 99 mg/dL   Magnesium   Result Value Ref Range    Magnesium 1.6 (L) 1.7 - 2.3 mg/dL   Phosphorus   Result Value Ref Range    Phosphorus 1.5 (L) 2.5 - 4.5 mg/dL   Hepatic function panel   Result Value Ref Range    Protein Total 5.5 (L) 6.4 - 8.3 g/dL    Albumin 3.0 (L) 3.5 - 5.2 g/dL    Bilirubin Total 2.1 (H) <=1.2 mg/dL    Alkaline Phosphatase 139 40 - 150 U/L    AST 22 0 - 45 U/L    ALT 61 (H) 0 - 50 U/L    Bilirubin Direct 1.08 (H) 0.00 - 0.30 mg/dL   CBC with Platelets & Differential    Narrative    The following orders were created for panel order CBC with Platelets & Differential.  Procedure                               Abnormality         Status                     ---------                               -----------         ------                     CBC with platelets and d...[[id number]]  Abnormal            Final result                 Please view results for these tests on the individual orders.   CBC with platelets and differential   Result Value Ref Range    WBC Count 30.8 (H) 4.0 - 11.0 10e3/uL    RBC Count 2.71 (L) 3.80 - 5.20 10e6/uL    Hemoglobin 8.9 (L)  11.7 - 15.7 g/dL    Hematocrit 27.2 (L) 35.0 - 47.0 %     78 - 100 fL    MCH 32.8 26.5 - 33.0 pg    MCHC 32.7 31.5 - 36.5 g/dL    RDW 18.7 (H) 10.0 - 15.0 %    Platelet Count 306 150 - 450 10e3/uL    % Neutrophils 88 %    % Lymphocytes 3 %    % Monocytes 2 %    % Eosinophils 3 %    % Basophils 0 %    % Immature Granulocytes 4 %    NRBCs per 100 WBC 0 <1 /100    Absolute Neutrophils 27.0 (H) 1.6 - 8.3 10e3/uL    Absolute Lymphocytes 1.0 0.8 - 5.3 10e3/uL    Absolute Monocytes 0.6 0.0 - 1.3 10e3/uL    Absolute Eosinophils 1.0 (H) 0.0 - 0.7 10e3/uL    Absolute Basophils 0.1 0.0 - 0.2 10e3/uL    Absolute Immature Granulocytes 1.2 (H) <=0.4 10e3/uL    Absolute NRBCs 0.0 10e3/uL     *Note: Due to a large number of results and/or encounters for the requested time period, some results have not been displayed. A complete set of results can be found in Results Review.     Dr. Navarrete staffed the patient.    Staff Involved:  Resident/Staff

## 2024-07-02 NOTE — CODE/RAPID RESPONSE
07/01/24 1900   Call Information   Date of Call 07/01/24   Time of Call 1950   Name of person requesting the team Jm MONTERROSO   Title of person requesting team RN   RRT Arrival time 1955   Time RRT ended 2010   Reason for call   Type of RRT Adult   Primary reason for call Sepsis suspected   Sepsis Suspected Elevated Lactate level;Heart Rate > 100;WBC <4 or >12   Was patient transferred from the ED, ICU, or PACU within last 24 hours prior to RRT call? No   SBAR   Situation Lactic Acid 3.0   Background Per provider note: History of 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 Other history includes osteopenia, LE cellulitis, crohn's disease on infliximab, and obesity. She underwent DCD DDLT w/ biliary stent placement (on OCS x 19.4 hours) on 6/22/24 with Dr. Ball. She discharged 6/28 and now presents with fever and new patchy induration to extremities and flank.   Notable History/Conditions Recent surgery   Assessment A&O x4, denies new pain. Lungs clear, HR slightly over 100. BP stable. No fever at the moment, but feels warm. Lower extremity edema. Endorses eating and drinking well in addition to voiding well. Up ad martinez in room with SBA.   Interventions Fluid bolus;Labs   Patient Outcome   Patient Outcome Stabilized on unit   RRT Team   Attending/Primary/Covering Physician Surg Transplant Liver   Date Attending Physician notified 07/01/24   Physician(s) Sariah Mcghee PA-C   Lead RN Adrián MONTERROSO   RT Adrian PEREZ   Post RRT Intervention Assessment   Post RRT Assessment Stable/Improved   Date Follow Up Done 07/01/24   Time Follow Up Done 2315   Comments 2.0 Lactic Acid recheck. Medications and environment adjusted for comfort

## 2024-07-02 NOTE — PROGRESS NOTES
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 Other history includes osteopenia, LE cellulitis, crohn's disease on infliximab, and obesity. She underwent DCD DDLT w/ biliary stent placement (on OCS x 19.4 hours) on 6/22/24 with Dr. Ball. She discharged 6/28 and now presents with fever and new patchy induration to extremities and flank.     s/p DCD DDLT +stent 6/22/24: POD#10. LFTs continue to downtrend overall.   - Continue ASA 81 mg daily and ursodiol 300 BID.      Immunosuppressed status secondary to medications:   Maintenance:    -  mg BID   - Tacrolimus 2 mg BID. Goal level 8-12.    - Prednisone 5 mg daily     Neuro:  Acute post op pain: Continue PRN Tylenol, Robaxin, oxycodone.   Insomnia: Trazodone at bedtime.       Hematology:   Acute blood loss anemia: Hgb 8-9, stable.     Cardiorespiratory: No issues.      GI/Nutrition:   Moderate malnutrition in the context of chronic illness: due to mild muscle loss, fluid accumulation, low albumin. Continue Regular diet with protein supplements.    Endocrine: No issues.      Fluid/Electrolytes:   Hyponatremia: Na 129, receiving  mL/hr.   Hypomagnesemia: Continue Mag-Ox 400 mg daily. Give additional 2 grams IV.   Metabolic acidosis: Start bicarb 650 mg BID.      Infectious disease:   Fevers of unknown origin: Admitted with fevers and leukocytosis. Infectious workup negative to date.    - Continue Vanc/Zosyn.   - Transplant ID consulted.     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 Dermatology consulted, likely SDRIFE.    - Start topical triamcinolone ointment 0.1% BID   - HOLD dapsone (has sulfa allergy)     Prophylaxis: DVT (mechanical, subcutaneous heparin),  fall, GI (PPI), viral (Valcyte), pneumocystis (sulfa allergy, G6PD normal, HOLD dapsone)     Disposition: 7A, PT consulted    DINESH/Fellow/Resident Provider: Philly Webb NP #[id number]     Faculty: Benedicto Elizalde M.D.    __________________________________________________________________  Transplant History:    6/22/2024 (Liver), Postoperative day: 10     Interval History: History is obtained from the patient  Overnight events: No acute events overnight. Remains afebrile on empiric antibiotics. Patient reports rash unchanged. Slept poorly.     ROS:   A 10-point review of systems was negative except as noted above.    Curent Meds:  Current Facility-Administered Medications   Medication Dose Route Frequency Provider Last Rate Last Admin    aspirin (ASA) chewable tablet 81 mg  81 mg Oral Daily Miguelina Weinstein APRN CNP   81 mg at 07/02/24 0828    [Held by provider] dapsone (ACZONE) tablet 50 mg  50 mg Oral or Feeding Tube Daily Philly Webb NP   50 mg at 07/02/24 0828    heparin ANTICOAGULANT injection 5,000 Units  5,000 Units Subcutaneous Q8H Philly Webb NP   5,000 Units at 07/02/24 1315    magnesium oxide (MAG-OX) tablet 400 mg  400 mg Oral Daily Philly Wbeb NP   400 mg at 07/02/24 1315    magnesium sulfate 2 g in 50 mL sterile water intermittent infusion  2 g Intravenous Once Philly Webb NP        mycophenolate (GENERIC EQUIVALENT) capsule 750 mg  750 mg Oral BID Miguelina Weinstein APRN CNP   750 mg at 07/02/24 0828    pantoprazole (PROTONIX) EC tablet 40 mg  40 mg Oral Daily Philly Webb NP   40 mg at 07/02/24 0828    phosphorus tablet 250 mg (PHOSPHA 250 NEUTRAL) per tablet 500 mg  500 mg Oral BID Philly Webb NP   500 mg at 07/02/24 1327    piperacillin-tazobactam (ZOSYN) 4.5 g vial to attach to  mL bag  4.5 g Intravenous Q6H Soledad Gomez MD 0 mL/hr at 06/30/24 2033 4.5 g at 07/02/24 1315    predniSONE (DELTASONE) tablet 5 mg  5 mg Oral Daily  Miguelina Weinstein APRN CNP   5 mg at 07/02/24 0828    sodium bicarbonate tablet 650 mg  650 mg Oral BID Philly Webb NP   650 mg at 07/02/24 0944    sodium chloride (PF) 0.9% PF flush 3 mL  3 mL Intracatheter Q8H Soledad Gomez MD   3 mL at 07/02/24 1317    tacrolimus (GENERIC EQUIVALENT) capsule 2 mg  2 mg Oral BID IS Philly Webb NP   2 mg at 07/02/24 0827    traZODone (DESYREL) half-tab 25-50 mg  25-50 mg Oral At Bedtime Fab Velazquez MD        Or    traZODone (DESYREL) tablet 50 mg  50 mg Oral At Bedtime Fab Velazquez MD   50 mg at 07/01/24 2146    triamcinolone (KENALOG) 0.1 % ointment   Topical BID Philly Webb NP   Given at 07/02/24 0947    ursodiol (ACTIGALL) capsule 300 mg  300 mg Oral or Feeding Tube BID Philly Webb NP   300 mg at 07/02/24 0828    valGANciclovir (VALCYTE) tablet 450 mg  450 mg Oral Daily Miguelina Weinstein APRN CNP   450 mg at 07/02/24 0828    vancomycin (VANCOCIN) 1,000 mg in 200 mL dextrose intermittent infusion  1,000 mg Intravenous Q12H Soledad Gomez  mL/hr at 07/02/24 1445 1,000 mg at 07/02/24 1445       Physical Exam:     Current Vitals:   /68 (BP Location: Left arm)   Pulse 93   Temp 98.5  F (36.9  C) (Oral)   Resp 16   Wt 85 kg (187 lb 6.4 oz)   LMP 05/31/2006   SpO2 96%   BMI 31.18 kg/m      Vital sign ranges:    Temp:  [98.5  F (36.9  C)-99.7  F (37.6  C)] 98.5  F (36.9  C)  Pulse:  [] 93  Resp:  [16-18] 16  BP: (102-119)/(57-68) 117/68  SpO2:  [92 %-100 %] 96 %    General Appearance: in no apparent distress.   Skin: erythema noted bilaterally on extremities, axilla, flank, back  Heart: RRR  Lungs: unlabored on RA  Abdomen: soft, non-distended. Incision C/D/I.   : no Conti present  Extremities: no edema noted  Neurologic: A&OX4. Tremor absent.     Frailty Scores          4/23/2024 2/4/2024 2/14/2023   Frailty Scores   Final Score Not Frail Not Frail Not Frail   Final Score Number 0 0 1      Details                    Data:   CMP  Recent Labs   Lab 07/02/24  1141 07/01/24  2146 07/01/24  0608 06/30/24  1728 06/30/24  1111   * 129* 128*   < > 128*   POTASSIUM 3.7 3.5 4.1   < > 4.5   CHLORIDE 101 101 99   < > 97*   CO2 17* 17* 20*   < > 21*   * 137* 109*   < > 148*   BUN 6.9* 8.9 7.8*   < > 10.5   CR 0.63 0.66 0.68   < > 0.70   GFRESTIMATED >90 >90 >90   < > >90   ARIEL 7.2* 7.2* 7.5*   < > 8.2*   MAG 1.6*  --  1.9   < >  --    PHOS 1.5*  --  2.7   < >  --    LIPASE  --   --   --   --  24   ALBUMIN 3.0* 2.8* 2.8*   < > 3.1*   BILITOTAL 2.1* 1.8* 1.8*   < > 1.8*   ALKPHOS 139 133 123   < > 140   AST 22 17 18   < > 25   ALT 61* 67* 78*   < > 114*    < > = values in this interval not displayed.     CBC  Recent Labs   Lab 07/02/24  1141 07/01/24  2146   HGB 8.9* 8.0*   WBC 30.8* 32.3*    271     Attestation:    The patient has been seen with team and evaluated by me.   Vital signs, labs, medications and orders were reviewed.   When obtained, diagnostic images were reviewed by me and interpreted as above.    The care plan was discussed with the multidisciplinary team and I agree with the findings and plan in this note, with any differences recorded in blue.    Immunosuppressive medication management was reviewed and adjusted as reflected in the note and orders.       Benedicto Elizalde MD  Professor of Surgery

## 2024-07-02 NOTE — CONSULTS
CMA completed on 7/1/2024.  Please refer to RNCC note from 7/1/2024 for additional information.    Susan Patel, RN BSN, PHN, ACM-RN  July 2, 2024  Nurse Coordinator    Phone: [phone]    Social Work and Care Management Department     7/2/2024

## 2024-07-02 NOTE — PLAN OF CARE
/61 (BP Location: Left arm)   Pulse 105   Temp 99.6  F (37.6  C) (Oral)   Resp 16   Wt 85 kg (187 lb 6.4 oz)   LMP 05/31/2006   SpO2 97%   BMI 31.18 kg/m      Shift: 7313-8960  Isolation Status: none  VS: stable on room air, afebrile  Neuro: Aox4  Behaviors: calm, cooperative  BG: none  Respiratory: WDL  Cardiac: WDL  Pain/Nausea: Low pain - abd area. Denies nausea  PRN: None given   Diet: Regular  IV Access: PIV  Infusion(s): NS@100  Lines/Drains: none  GI/: BM 7/2, Voiding spontaneously without difficulty   Skin: Clamshell, stapled, KRISTIAN. Left side cellulitis - generalized rash.   Mobility: UAL  Plan: Continue POC

## 2024-07-02 NOTE — PLAN OF CARE
/61 (BP Location: Left arm)   Pulse 108   Temp 99.7  F (37.6  C) (Oral)   Resp 18   LMP 05/31/2006   SpO2 95%     Shift: 3719-7775  VS: BPs in low 100s on RA, Tmax 99.7  Neuro: AOx4  BG: none  Labs: lactic recheck 2.0 (down from 3.3 after NS bolus)  Respiratory: WNL, denies SOB  Cardiac: tele sinus tach  Pain/Nausea/PRN: one time dose of ativan, denies nausea  Diet: regular  LDA: PIV infusing NS @ 100 (zosyn and vanco)  GI/: voids, LBM yesterday  Skin: clamshell stapled  Mobility: SBA  Plan: continue POC    Handoff given to following RN.

## 2024-07-02 NOTE — PHARMACY-VANCOMYCIN DOSING SERVICE
Pharmacy Vancomycin Note  Date of Service 2024  Patient's  1965   59 year old, female    Indication: Sepsis and Skin and Soft Tissue Infection  Day of Therapy: 3  Current vancomycin regimen:  1000 mg IV q12h  Current vancomycin monitoring method: AUC  Current vancomycin therapeutic monitoring goal: 400-600 mg*h/L    Insight[employer] Prediction of Current Vancomycin Regimen    Loading dose: N/A  Regimen: 1000 mg IV every 12 hours.  Start time: 13:41 on 2024  Exposure target: AUC24 (range)400-600 mg/L.hr   AUC24,ss: 436 mg/L.hr  Probability of AUC24 > 400: 69 %  Ctrough,ss: 13.3 mg/L  Probability of Ctrough,ss > 20: 7 %  Probability of nephrotoxicity (Lodise JAIME ): 8 %    Creatinine for last 3 days  2024: 11:11 AM Creatinine 0.70 mg/dL;  5:28 PM Creatinine 0.63 mg/dL  2024:  6:08 AM Creatinine 0.68 mg/dL;  9:46 PM Creatinine 0.66 mg/dL    Current estimated CrCl = Estimated Creatinine Clearance: 97.6 mL/min (based on SCr of 0.66 mg/dL).    Recent Vancomycin Levels (past 3 days)  2024:  6:35 AM Vancomycin 18.8 ug/mL    Vancomycin IV Administrations (past 72 hours)                     vancomycin (VANCOCIN) 1,000 mg in 200 mL dextrose intermittent infusion (mg) 1,000 mg New Bag 24 0141     1,000 mg New Bag 24 1426     1,000 mg New Bag  0226    vancomycin (VANCOCIN) 2,000 mg in sodium chloride 0.9 % 500 mL intermittent infusion (mg) 2,000 mg New Bag 24 1316                    Nephrotoxins and other renal medications (From now, onward)      Start     Dose/Rate Route Frequency Ordered Stop    24 1800  tacrolimus (GENERIC EQUIVALENT) capsule 2 mg         2 mg Oral 2 TIMES DAILY. 24 1241      24 0130  vancomycin (VANCOCIN) 1,000 mg in 200 mL dextrose intermittent infusion         1,000 mg  200 mL/hr over 1 Hours Intravenous EVERY 12 HOURS 24 1315      24 1830  piperacillin-tazobactam (ZOSYN) 4.5 g vial to attach to  mL bag        Note to  "Pharmacy: For [facility], [facility] and [facility]: For Zosyn-naive patients, use the \"Zosyn initial dose + extended infusion\" order panel.    4.5 g  over 30 Minutes Intravenous EVERY 6 HOURS 06/30/24 1416                 Contrast Orders - past 72 hours (72h ago, onward)      Start     Dose/Rate Route Frequency Stop    06/30/24 1240  iopamidol (ISOVUE-370) solution 109 mL         109 mL Intravenous ONCE 06/30/24 1253            Interpretation of levels and current regimen:  Vancomycin level is reflective of -600    Has serum creatinine changed greater than 50% in last 72 hours: No    Urine output:  unable to determine (unmeasured urine output)    Renal Function: Stable    Plan:  Continue Current Dose  Vancomycin monitoring method: AUC  Vancomycin therapeutic monitoring goal: 400-600 mg*h/L  Pharmacy will check vancomycin levels as appropriate in 3-5 Days.  Serum creatinine levels will be ordered a minimum of twice weekly.    Marlene Shell, Pharm.D., [facility], [facility]  Pager [phone]  "

## 2024-07-02 NOTE — CONSULTS
Transplant Infectious Diseases Inpatient Consultation      Abiola Matute MRN# [id number]   YOB: 1965 Age: 59 year old   Date of Admission and time: 6/30/2024 10:43 AM     Reason for consult: Fever, leukocytosis         Recommendations:   Continue vanc and pip/tazo for today pending blood culture x72 hours  Would discuss with IR about whether they could sample the fluid around the transplanted liver and the R-sided pleural effusion as possible source of infection.   Would send for bacterial and fungal cultures if able   Repeat CBC with differential tomorrow. If she continues to have eosinophilia, would obtain peripheral smear and consult hematology to assist with additional work-up  Appreciate dermatology assistance with work-up of rash  Dapsone for PJP PPx  Continue VGCV CMV PPx    Transplant infectious diseases will continue to follow with you.     Attestation:  Total duration of visit including chart review, reviewing labs and imaging, interviewing and examining the patient, documentation, and sending communication to the primary treating team, all at the same day of this encounter, is: >80 minutes.       Lisa Gilbert MD  Transplant Infectious Diseases Attending  [phone]          Synopsis of Clinical Presentation and Course   Transplant Summary  Transplants:  6/22/2024 (Liver); POD  10.  Coordinator Zayra Paulino  Reason for Transplantation: DUNN/EtOH  Current Immunosuppression: Prednisone 5 mg/day, tacrolimus,  mg BID.     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 She was discharged on 6/28/24 and re-admitted on 6/30/24 with fevers to 101 and patchy induratio non RUE/axilla/flank. We are being consulted for assistance with infectious diseases work-up.         Active Problems and Infectious Diseases Issues:   Leukocytosis (WBC 30)  Fevers  (resolved)  Work-up thus far negative for infectious etiology including blood cultures x2 (6/30, 7/1), urine culture (7/1), C diff negative (6/30). CT A/P from 6/30/14 with a R pleural effusion, a 5.4 x1.5 cm seroma vs hematoma in R flank, 6.8 x 2.4 cm resolving hematoma around the transplanted liver, and small volume ascites in the pelvis. Any of these fluid collections could be a source of infection. She doesn't have any significant respiratory symptoms (cough, sputum production), so this is not consistent with a pneumonia. She does have a rash and eosinophilia, but per dermatology this is not consistent with a DRESS syndrome. Tbili is stable at 1.8, so less likely related to biliary source. Fortunately, her fever has not returned since hospital admission. CMV VL is negative.     Eosinophilia   At this point, unclear etiology. 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 They do not think her presentation is consistent with a DRESS syndrome. She has not traveled to any strongyloides-endemic region, so this is low likelihood. She has not worked in an industry in which she was exposed to ascariasis. Coccidioidomycosis can cause eosinophilia, but she did not go hiking or spend significant time outdoors when she was in [place] 4 years ago. Eosinophilia could be related to drug reaction (pantoprazole and dapsone have both been associated; pip/tazo and vancomycin are also associated with this, but they have been started within the last 72 hours so less likely). At this point, would repeat CBC with diff tomorrow. If eosinophilia increases, would consult heme for additional work-up.     Transplant Checklist  - QTc interval: ?  - Pneumocystis prophylaxis:  On dapsone H/o hives to sulfa  - Bacterial prophylaxis: None  - Fungal prophylaxis: None  -  Serostatus & viral prophylaxis: CMV D-/R+, HSV ?, EBV D+/R+ Valgancyclovir  - Immunization status:  Readdress at 3 months post-transplant  - Gamma globulin status: No lab results found.  - Antibiotic allergies: Hives with sulfa and pain/itching with metronidazole. Should have allergy consult in coming months to assess for ongoing sulfa allergy.          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 She was discharged on 6/28/24 and re-admitted on 6/30/24 with fevers and patchy induratio non RUE/axilla/flank. We are being consulted for assistance with infectious diseases work-up.     Patient noted she was at home after discharge. She developed fevers to >101 F with induration on RUE/axilla/flank that appeared the day before admission. Work-up remarkable for WBC 26.0 with lactate of 3.3. Started on vanc and pip/tazo. Blood cultures from 6/30 and 7/1 remain NGTD. CT C/A/P (6/30/21) with small to moderate R-sided pleural effusion with R-sided groundglass density, small volume ascites, and 6.8 x 2.4 cm collection in medial to R hemiliver consistent with resorbing hematoma.     Dermatology consulted, who feel like her flank rash is consistent with a symettric drug-related intertrigionous and flexural exanthema (SDRIFE)    Exposure History   Demographics: Lived in [place] for 39 years. Previously lived in [place] (grew up there) and moved to [place] in [id number].   Travel: Did travel to [place] 4 years ago for vacation. Didn't go hiking or do something that stirred up soil. [place] in 2023. [place], [place], [place]. Did vacation. Didn't go hiking, but did beach vacations.   Occupation: Currently disabled.  at [employer].   Hobbies: Enjoys spending time with grandchildren. Likes to golf.  Lies to go camping. Does some yard work. Last did this in 2nd week in June (mowing lawn).   Animals: None   Substances: None   TB Exposure: No known exposure to TB. Never been to an endemic country. No residential exposure. No homeless shelter exposure. No healthcare exposure.   Strongyloides Exposure: No high risk exposure  Coccidioides Exposure: No high risk exposure  Chagas Exposure: No high risk exposure     Abx  Branden/tazo: 6/30-current  Vancomycin: 6/30-current               Review of Systems:      As mentioned in the HPI otherwise negative by reviewing constitutional symptoms, central and peripheral neurological systems, respiratory system, cardiac system, GI system,  system, musculoskeletal, skin, allergy, and lymphatics.                  Past Medical History:     Past Medical History:   Diagnosis Date    Alcohol abuse     Alcoholic cirrhosis of liver with ascites     Anal fistula     Atypical ductal hyperplasia of breast 09/10/2010    ERT not recommended -left - and flat epithelial atypia-scheduled for breast biopsy 9/17/2010     Bifid uvula     Cholelithiasis     Contact perianal dermatitis and other eczema     recurrent - clobetasol     Crohn disease     Fear of flying      - gets Ativan prn.     HCC (hepatocellular carcinoma) (H) 2/1/2023    Hypertension     IBS (irritable bowel syndrome)             Past Surgical History:     Past Surgical History:   Procedure Laterality Date    BENCH LIVER  6/22/2024    Procedure: Bench liver;  Surgeon: Pravin Ball MD;  Location: [facility] OR    BIOPSY ANAL N/A 3/28/2019    anal biopsy and culure placement of seton - Dr Fleming    BIOPSY BREAST Left 09/17/2010    - scheduled with Dr. Varma     BIOPSY LIVER  2019    COLONOSCOPY  2006    COLONOSCOPY N/A 12/23/2014    Procedure: COMBINED COLONOSCOPY, SINGLE OR MULTIPLE BIOPSY/POLYPECTOMY BY BIOPSY;  Surgeon: Diane Fleming MD;  Location:  GI    COLONOSCOPY N/A 12/5/2019    Procedure: COLONOSCOPY, WITH  POLYPECTOMY AND BIOPSY;  Surgeon: Farhan Schilling MD;  Location: [facility] GI    COLONOSCOPY N/A 2/27/2024    Procedure: COLONOSCOPY, WITH POLYPECTOMY AND BIOPSY;  Surgeon: Michelle Diaz MD;  [place] [facility]    ENDOSCOPIC RETROGRADE CHOLANGIOPANCREATOGRAM N/A 4/24/2020    Procedure: ENDOSCOPIC RETROGRADE CHOLANGIOPANCREATOGRAPHY WITH, sledge removal,sphincterotomy, stent in gallbladder and pancreatic duct stent, and balloon dilation;  Surgeon: Guru Isac Kraft MD;  Location: [facility] OR    ESOPHAGOSCOPY, GASTROSCOPY, DUODENOSCOPY (EGD), COMBINED N/A 12/5/2019    Procedure: ESOPHAGOGASTRODUODENOSCOPY (EGD);  Surgeon: Farhan Schilling MD;  Location: [facility] GI    ESOPHAGOSCOPY, GASTROSCOPY, DUODENOSCOPY (EGD), COMBINED N/A 5/11/2023    Procedure: Esophagoscopy, gastroscopy, duodenoscopy (EGD), combined;  Surgeon: Jeannette Cervantes MD;  Location: [facility] GI    EXAM UNDER ANESTHESIA ANUS N/A 3/28/2019    Procedure: EXAM UNDER ANESTHESIA ANUS;  Surgeon: Diane Fleming MD;  Location:  OR    EXAM UNDER ANESTHESIA ANUS N/A 2/26/2021    Procedure: EXAM UNDER ANESTHESIA OF ANUS, SETON PLACEMENT, EXCISION OF SKIN BRIDGE;  Surgeon: Avtar Nam MD;  [place] [facility]    EXAM UNDER ANESTHESIA ANUS N/A 1/6/2023    Procedure: EXAM UNDER ANESTHESIA, ANUS, SETON EXCHANGE, partial fistulotomy;  Surgeon: Mary Dugan MD;  [place] [facility]    HYSTERECTOMY, VAGINAL  2006    with Dr. Licha Zhou - with BSO for fibroids     IR GALLBLADDER DRAIN PLACEMENT  4/22/2020    IR SIRT (SELECTIVE INTERNAL RADIO THERAPY)  2/21/2023    IR VISCERAL ANGIOGRAM  2/21/2023    IR VISCERAL EMBOLIZATION  2/27/2023    LAPAROSCOPIC ABLATION LIVER TUMOR N/A 8/29/2023    Procedure: Diagnostic Laparoscopy, Laparoscopic microwave ablation of liver tumor intraoperative ultrasound of liver, lysis of adhesions 1 hour,;  Surgeon: Albino Saavedra MD;  Location: [facility] OR    OPEN REDUCTION INTERNAL FIXATION ANKLE Left at  age 27    plates and screws removed at age 36    Pelviscopy with removal of bilateral hydrosalpinges.  04/15/2010    TRANSPLANT LIVER RECIPIENT  DONOR N/A 2024    Procedure: Transplant liver recipient  donor, with biliary stent placement;  Surgeon: Pravin Ball MD;  Location:  OR    [facility] APPENDECTOMY  at age 14            Social History:     Social History     Tobacco Use    Smoking status: Never     Passive exposure: Never    Smokeless tobacco: Never   Substance Use Topics    Alcohol use: Not Currently            Family History:   I have reviewed this patient's family history  Family History   Problem Relation Age of Onset    Breast Cancer Mother     Gastrointestinal Disease Mother     Heart Disease Father 57    Hypertension Maternal Grandmother     Substance Abuse Maternal Grandfather     Diabetes Maternal Grandfather     Diabetes Paternal Grandmother     Heart Disease Paternal Grandfather     Cancer Sister     Skin Cancer Sister     Substance Abuse Maternal Uncle     Substance Abuse Maternal Uncle     Suicide Niece     Suicide Niece     Anesthesia Reaction No family hx of     Thrombosis No family hx of             Immunizations:     Immunization History   Administered Date(s) Administered    COVID-19 MONOVALENT 12+ (Pfizer) 2021, 2021, 2021    Influenza (IIV3) PF 2014    Influenza Vaccine 18-64 (Flublok) 10/03/2019, 09/15/2021, 2022    Influenza Vaccine >6 months,quad, PF 2016, 2020, 2024    Influenza Vaccine, 6+MO IM (QUADRIVALENT W/PRESERVATIVES) 2015, 10/05/2017    MMR 2007    Pneumococcal 20 valent Conjugate (Prevnar 20) 2022    Pneumococcal 23 valent 2020    TD,PF 7+ (Tenivac) 1999    TDAP Vaccine (Adacel) 2020    TDAP Vaccine (Boostrix) 2010    Zoster recombinant adjuvanted (SHINGRIX) 2020, 2020            Allergies:     Allergies   Allergen Reactions    Fish Oil      Redness  and itching around eye area only - went away when fish oil capsules stopped     Metronidazole      pain/itching    Ppd [Tuberculin Purified Protein Derivative]     Sulfa Antibiotics      hives    Terbinafine And Related      Lamisil = mild urticarial reaction             Medications:   Medications that Require Transfusion:   Current Facility-Administered Medications   Medication Dose Route Frequency Provider Last Rate Last Admin    sodium chloride 0.9 % infusion   Intravenous Continuous Chloé Russell  mL/hr at 07/02/24 1135 New Bag at 07/02/24 1135     Scheduled Medications:   Current Facility-Administered Medications   Medication Dose Route Frequency Provider Last Rate Last Admin    aspirin (ASA) chewable tablet 81 mg  81 mg Oral Daily Miguelina Weinstein APRN CNP   81 mg at 07/02/24 0828    [Held by provider] dapsone (ACZONE) tablet 50 mg  50 mg Oral or Feeding Tube Daily Philly Webb NP   50 mg at 07/02/24 0828    heparin ANTICOAGULANT injection 5,000 Units  5,000 Units Subcutaneous Q8H Philly Webb NP   5,000 Units at 07/02/24 1315    magnesium oxide (MAG-OX) tablet 400 mg  400 mg Oral Daily Philly Webb NP   400 mg at 07/02/24 1315    magnesium sulfate 2 g in 50 mL sterile water intermittent infusion  2 g Intravenous Once Philly Webb NP 50 mL/hr at 07/02/24 1556 2 g at 07/02/24 1556    mycophenolate (GENERIC EQUIVALENT) capsule 750 mg  750 mg Oral BID Miguelina Weinstein APRN CNP   750 mg at 07/02/24 0828    pantoprazole (PROTONIX) EC tablet 40 mg  40 mg Oral Daily Philly Webb NP   40 mg at 07/02/24 0828    phosphorus tablet 250 mg (PHOSPHA 250 NEUTRAL) per tablet 500 mg  500 mg Oral BID Philly Webb NP   500 mg at 07/02/24 1327    piperacillin-tazobactam (ZOSYN) 4.5 g vial to attach to  mL bag  4.5 g Intravenous Q6H Soledad Gomez MD 0 mL/hr at 06/30/24 2033 4.5 g at 07/02/24 1315    predniSONE (DELTASONE) tablet 5 mg  5 mg Oral Daily  Miguelina Weinstein APRN CNP   5 mg at 07/02/24 0828    sodium bicarbonate tablet 650 mg  650 mg Oral BID Philly Webb NP   650 mg at 07/02/24 0944    sodium chloride (PF) 0.9% PF flush 3 mL  3 mL Intracatheter Q8H Soledad Gomez MD   3 mL at 07/02/24 1317    tacrolimus (GENERIC EQUIVALENT) capsule 2 mg  2 mg Oral BID IS Philly Webb NP   2 mg at 07/02/24 0827    traZODone (DESYREL) half-tab 25-50 mg  25-50 mg Oral At Bedtime Fab Velaqzuez MD        Or    traZODone (DESYREL) tablet 50 mg  50 mg Oral At Bedtime Fab Velazquez MD   50 mg at 07/01/24 2146    triamcinolone (KENALOG) 0.1 % ointment   Topical BID Philly Webb NP   Given at 07/02/24 0947    ursodiol (ACTIGALL) capsule 300 mg  300 mg Oral or Feeding Tube BID Philly Webb NP   300 mg at 07/02/24 0828    valGANciclovir (VALCYTE) tablet 450 mg  450 mg Oral Daily Miguelina Weinstein APRN CNP   450 mg at 07/02/24 0828    vancomycin (VANCOCIN) 1,000 mg in 200 mL dextrose intermittent infusion  1,000 mg Intravenous Q12H Soledad Gomez  mL/hr at 07/02/24 1445 1,000 mg at 07/02/24 1445          Physical Exam     Physical Exam     Vital signs:  /68 (BP Location: Left arm)   Pulse 93   Temp 98.5  F (36.9  C) (Oral)   Resp 16   Wt 85 kg (187 lb 6.4 oz)   LMP 05/31/2006   SpO2 96%   BMI 31.18 kg/m      GENERAL: alert and no distress  NECK: no adenopathy, no asymmetry, masses, or scars  RESP: lungs clear to auscultation - no rales, rhonchi or wheezes  CV: regular rate and rhythm, normal S1 S2, no S3 or S4, no murmur, click or rub, no peripheral edema. Surgical incision site is well-healing and without drainage or erythema.   ABDOMEN: soft, nontender, no hepatosplenomegaly, no masses and bowel sounds normal  MS: no gross musculoskeletal defects noted, no edema  SKIN: Erythematous rash on flexor surfaces of arms, intertriginous areas.     Data     Data   Laboratory data and imaging listed below was reviewed  prior to this encounter.     Microbiology:    Culture   Date Value Ref Range Status   07/01/2024 No growth after 12 hours  Preliminary   07/01/2024 No growth after 12 hours  Preliminary   06/30/2024 No growth after 2 days  Preliminary   06/30/2024 No growth after 2 days  Preliminary   06/22/2024 No anaerobic organisms isolated  Final   06/22/2024 No growth after 10 days  Preliminary   06/22/2024 No Growth  Final   06/20/2024 <10,000 CFU/mL Mixture of Urogenital Gayla  Final   06/20/2024   Final    No Vancomycin Resistant Enterococcus species isolated     GS Culture   Date Value Ref Range Status   06/22/2024 See corresponding culture for results  Final   , CD4: No lab results found. and HIV RNA: No lab results found., Hepatitis B Testing:   Recent Labs   Lab Test 06/20/24 1651 02/14/23 0824   HBCAB Nonreactive Nonreactive   HEPBANG Nonreactive Nonreactive   , Hepatitis C Testing:   Hepatitis C Antibody   Date Value Ref Range Status   06/20/2024 Nonreactive Nonreactive Final     Comment:     A nonreactive screening test result does not exclude the possibility of exposure to or infection with HCV. Nonreactive screening test results in individuals with prior exposure to HCV may be due to antibody levels below the limit of detection of this assay or lack of reactivity to the HCV antigens used in this assay. Patients with recent HCV infections (<3 months from time of exposure) may have false-negative HCV antibody results due to the time needed for seroconversion (average of 8 to 9 weeks).   02/14/2023 Nonreactive Nonreactive Final   05/20/2014 Negative NEG Final   , HSV 1/2 IgG: No lab results found. and VZV IgG: No lab results found., CMV IgG:   Recent Labs   Lab Test 06/20/24 1651 02/14/23 0824   CMVIGGIV <0.20 <0.20   CMVIGG No detectable antibody. No detectable antibody.   , and EBV EA IgG: No lab results found.                    Lisa Gilbert MD  [facility]  Center  Contact information available via [facility] Paging/Directory     07/02/2024

## 2024-07-02 NOTE — PROGRESS NOTES
/63 (BP Location: Left arm)   Pulse 105   Temp 99.1  F (37.3  C) (Oral)   Resp 17   LMP 05/31/2006   SpO2 100%     [id number]    Patient A&Ox4, tachycardic but OVSS, on room air, regular diet tolerated well with a fair appetite. Pain stated 5/10 tylenol given x1 and oxycodone given x1. [place] triggered sepsis, lab was drawn and lactic acid came back 3.0, and a redraw resulted in 3.3. Urine culture results are pending. No change in the generalized rash, borders are marked.  is at bedside during the day, is very supportive and attentive. Continue POC.

## 2024-07-02 NOTE — PLAN OF CARE
Problem: Adult Inpatient Plan of Care  Goal: Absence of Hospital-Acquired Illness or Injury  Intervention: Prevent Skin Injury  Recent Flowsheet Documentation  Taken 7/2/2024 1500 by Thad Reynolds RN  Body Position: position changed independently     Problem: Pain Acute  Goal: Optimal Pain Control and Function  Intervention: Develop Pain Management Plan  Recent Flowsheet Documentation  Taken 7/2/2024 1300 by Thad Reynolds RN  Pain Management Interventions: declines  Intervention: Prevent or Manage Pain  Recent Flowsheet Documentation  Taken 7/2/2024 1500 by Thad Reynolds RN  Medication Review/Management: medications reviewed     Problem: Infection  Goal: Absence of Infection Signs and Symptoms  Intervention: Prevent or Manage Infection  Recent Flowsheet Documentation  Taken 7/2/2024 1500 by Thad Reynolds RN  Isolation Precautions: protective environment maintained   Goal Outcome Evaluation:         /61 (BP Location: Left arm)   Pulse 95   Temp 98.6  F (37  C) (Oral)   Resp 16   Wt 85 kg (187 lb 6.4 oz)   LMP 05/31/2006   SpO2 99%   BMI 31.18 kg/m      Abdominal pain - given PRN Robaxin 2 x's and PRN Tylenol with relief. 5 loose stools.  Voiding on hat. Paged on-bossman provider, pt c/o soreness on her bottom. Gave barrier cream and ordered commode.  at the bedside, very supportive.   Replaced Magnesium with IV 2 gm and Phosphorous 500.

## 2024-07-03 ENCOUNTER — APPOINTMENT (OUTPATIENT)
Dept: GENERAL RADIOLOGY | Facility: CLINIC | Age: 59
DRG: 607 | End: 2024-07-03
Attending: PHYSICIAN ASSISTANT
Payer: MEDICARE

## 2024-07-03 ENCOUNTER — TELEPHONE (OUTPATIENT)
Dept: FAMILY MEDICINE | Facility: CLINIC | Age: 59
End: 2024-07-03
Payer: MEDICARE

## 2024-07-03 ENCOUNTER — MEDICAL CORRESPONDENCE (OUTPATIENT)
Dept: HEALTH INFORMATION MANAGEMENT | Facility: CLINIC | Age: 59
End: 2024-07-03

## 2024-07-03 ENCOUNTER — APPOINTMENT (OUTPATIENT)
Dept: GENERAL RADIOLOGY | Facility: CLINIC | Age: 59
DRG: 607 | End: 2024-07-03
Attending: STUDENT IN AN ORGANIZED HEALTH CARE EDUCATION/TRAINING PROGRAM
Payer: MEDICARE

## 2024-07-03 LAB
ALBUMIN SERPL BCG-MCNC: 2.8 G/DL (ref 3.5–5.2)
ALBUMIN SERPL BCG-MCNC: 2.9 G/DL (ref 3.5–5.2)
ALP SERPL-CCNC: 127 U/L (ref 40–150)
ALP SERPL-CCNC: 137 U/L (ref 40–150)
ALT SERPL W P-5'-P-CCNC: 56 U/L (ref 0–50)
ALT SERPL W P-5'-P-CCNC: 62 U/L (ref 0–50)
ANION GAP SERPL CALCULATED.3IONS-SCNC: 10 MMOL/L (ref 7–15)
ANION GAP SERPL CALCULATED.3IONS-SCNC: 12 MMOL/L (ref 7–15)
APPEARANCE FLD: ABNORMAL
APTT PPP: 32 SECONDS (ref 22–38)
AST SERPL W P-5'-P-CCNC: 26 U/L (ref 0–45)
AST SERPL W P-5'-P-CCNC: 35 U/L (ref 0–45)
BACTERIA UR CULT: NO GROWTH
BASOPHILS # BLD AUTO: ABNORMAL 10*3/UL
BASOPHILS # BLD AUTO: ABNORMAL 10*3/UL
BASOPHILS # BLD MANUAL: 0 10E3/UL (ref 0–0.2)
BASOPHILS # BLD MANUAL: 0 10E3/UL (ref 0–0.2)
BASOPHILS NFR BLD AUTO: ABNORMAL %
BASOPHILS NFR BLD AUTO: ABNORMAL %
BASOPHILS NFR BLD MANUAL: 0 %
BASOPHILS NFR BLD MANUAL: 0 %
BILIRUB DIRECT SERPL-MCNC: 0.91 MG/DL (ref 0–0.3)
BILIRUB SERPL-MCNC: 1.5 MG/DL
BILIRUB SERPL-MCNC: 1.9 MG/DL
BUN SERPL-MCNC: 6.7 MG/DL (ref 8–23)
BUN SERPL-MCNC: 6.7 MG/DL (ref 8–23)
CALCIUM SERPL-MCNC: 6.9 MG/DL (ref 8.8–10.2)
CALCIUM SERPL-MCNC: 7 MG/DL (ref 8.8–10.2)
CELL COUNT BODY FLUID SOURCE: ABNORMAL
CHLORIDE SERPL-SCNC: 101 MMOL/L (ref 98–107)
CHLORIDE SERPL-SCNC: 104 MMOL/L (ref 98–107)
COLOR FLD: ABNORMAL
CREAT SERPL-MCNC: 0.63 MG/DL (ref 0.51–0.95)
CREAT SERPL-MCNC: 0.67 MG/DL (ref 0.51–0.95)
CRP SERPL-MCNC: 144 MG/L
DEPRECATED HCO3 PLAS-SCNC: 18 MMOL/L (ref 22–29)
DEPRECATED HCO3 PLAS-SCNC: 18 MMOL/L (ref 22–29)
EGFRCR SERPLBLD CKD-EPI 2021: >90 ML/MIN/1.73M2
EGFRCR SERPLBLD CKD-EPI 2021: >90 ML/MIN/1.73M2
EOSINOPHIL # BLD AUTO: ABNORMAL 10*3/UL
EOSINOPHIL # BLD AUTO: ABNORMAL 10*3/UL
EOSINOPHIL # BLD MANUAL: 0.8 10E3/UL (ref 0–0.7)
EOSINOPHIL # BLD MANUAL: 2 10E3/UL (ref 0–0.7)
EOSINOPHIL NFR BLD AUTO: ABNORMAL %
EOSINOPHIL NFR BLD AUTO: ABNORMAL %
EOSINOPHIL NFR BLD MANUAL: 3 %
EOSINOPHIL NFR BLD MANUAL: 7 %
ERYTHROCYTE [DISTWIDTH] IN BLOOD BY AUTOMATED COUNT: 18.8 % (ref 10–15)
ERYTHROCYTE [DISTWIDTH] IN BLOOD BY AUTOMATED COUNT: 18.8 % (ref 10–15)
ERYTHROCYTE [DISTWIDTH] IN BLOOD BY AUTOMATED COUNT: 19.2 % (ref 10–15)
GLUCOSE SERPL-MCNC: 103 MG/DL (ref 70–99)
GLUCOSE SERPL-MCNC: 115 MG/DL (ref 70–99)
HCT VFR BLD AUTO: 21.6 % (ref 35–47)
HCT VFR BLD AUTO: 21.8 % (ref 35–47)
HCT VFR BLD AUTO: 22.9 % (ref 35–47)
HGB BLD-MCNC: 7.1 G/DL (ref 11.7–15.7)
HGB BLD-MCNC: 7.2 G/DL (ref 11.7–15.7)
HGB BLD-MCNC: 7.7 G/DL (ref 11.7–15.7)
IMM GRANULOCYTES # BLD: ABNORMAL 10*3/UL
IMM GRANULOCYTES # BLD: ABNORMAL 10*3/UL
IMM GRANULOCYTES NFR BLD: ABNORMAL %
IMM GRANULOCYTES NFR BLD: ABNORMAL %
INR PPP: 1.1 (ref 0.85–1.15)
LACTATE SERPL-SCNC: 2 MMOL/L (ref 0.7–2)
LACTATE SERPL-SCNC: 2.1 MMOL/L (ref 0.7–2)
LACTATE SERPL-SCNC: 2.5 MMOL/L (ref 0.7–2)
LACTATE SERPL-SCNC: 2.7 MMOL/L (ref 0.7–2)
LACTATE SERPL-SCNC: 4.2 MMOL/L (ref 0.7–2)
LD BODY BODY FLUID SOURCE: NORMAL
LDH FLD L TO P-CCNC: 147 U/L
LDH SERPL L TO P-CCNC: 258 U/L (ref 0–250)
LYMPHOCYTES # BLD AUTO: ABNORMAL 10*3/UL
LYMPHOCYTES # BLD AUTO: ABNORMAL 10*3/UL
LYMPHOCYTES # BLD MANUAL: 0.8 10E3/UL (ref 0.8–5.3)
LYMPHOCYTES # BLD MANUAL: 1.5 10E3/UL (ref 0.8–5.3)
LYMPHOCYTES NFR BLD AUTO: ABNORMAL %
LYMPHOCYTES NFR BLD AUTO: ABNORMAL %
LYMPHOCYTES NFR BLD MANUAL: 3 %
LYMPHOCYTES NFR BLD MANUAL: 5 %
LYMPHOCYTES NFR FLD MANUAL: 48 %
MAGNESIUM SERPL-MCNC: 1.9 MG/DL (ref 1.7–2.3)
MCH RBC QN AUTO: 33.2 PG (ref 26.5–33)
MCH RBC QN AUTO: 33.3 PG (ref 26.5–33)
MCH RBC QN AUTO: 33.6 PG (ref 26.5–33)
MCHC RBC AUTO-ENTMCNC: 32.9 G/DL (ref 31.5–36.5)
MCHC RBC AUTO-ENTMCNC: 33 G/DL (ref 31.5–36.5)
MCHC RBC AUTO-ENTMCNC: 33.6 G/DL (ref 31.5–36.5)
MCV RBC AUTO: 100 FL (ref 78–100)
MCV RBC AUTO: 101 FL (ref 78–100)
MCV RBC AUTO: 101 FL (ref 78–100)
MONOCYTES # BLD AUTO: ABNORMAL 10*3/UL
MONOCYTES # BLD AUTO: ABNORMAL 10*3/UL
MONOCYTES # BLD MANUAL: 0.6 10E3/UL (ref 0–1.3)
MONOCYTES # BLD MANUAL: 0.6 10E3/UL (ref 0–1.3)
MONOCYTES NFR BLD AUTO: ABNORMAL %
MONOCYTES NFR BLD AUTO: ABNORMAL %
MONOCYTES NFR BLD MANUAL: 2 %
MONOCYTES NFR BLD MANUAL: 2 %
MONOS+MACROS NFR FLD MANUAL: 0 %
NEUTROPHILS # BLD AUTO: ABNORMAL 10*3/UL
NEUTROPHILS # BLD AUTO: ABNORMAL 10*3/UL
NEUTROPHILS # BLD MANUAL: 25 10E3/UL (ref 1.6–8.3)
NEUTROPHILS # BLD MANUAL: 25.8 10E3/UL (ref 1.6–8.3)
NEUTROPHILS NFR BLD AUTO: ABNORMAL %
NEUTROPHILS NFR BLD AUTO: ABNORMAL %
NEUTROPHILS NFR BLD MANUAL: 86 %
NEUTROPHILS NFR BLD MANUAL: 92 %
NEUTS BAND NFR FLD MANUAL: 20 %
NRBC # BLD AUTO: 0 10E3/UL
NRBC # BLD AUTO: 0.1 10E3/UL
NRBC BLD AUTO-RTO: 0 /100
NRBC BLD AUTO-RTO: 0 /100
OTHER CELLS FLD MANUAL: 33 %
PHOSPHATE SERPL-MCNC: 1.9 MG/DL (ref 2.5–4.5)
PLAT MORPH BLD: ABNORMAL
PLAT MORPH BLD: ABNORMAL
PLATELET # BLD AUTO: 312 10E3/UL (ref 150–450)
PLATELET # BLD AUTO: 330 10E3/UL (ref 150–450)
PLATELET # BLD AUTO: 410 10E3/UL (ref 150–450)
POLYCHROMASIA BLD QL SMEAR: SLIGHT
POLYCHROMASIA BLD QL SMEAR: SLIGHT
POTASSIUM SERPL-SCNC: 3.5 MMOL/L (ref 3.4–5.3)
POTASSIUM SERPL-SCNC: 3.6 MMOL/L (ref 3.4–5.3)
PROCALCITONIN SERPL IA-MCNC: 0.61 NG/ML
PROT FLD-MCNC: 2.4 G/DL
PROT SERPL-MCNC: 5 G/DL (ref 6.4–8.3)
PROT SERPL-MCNC: 5.3 G/DL (ref 6.4–8.3)
PROTEIN BODY FLUID SOURCE: NORMAL
RBC # BLD AUTO: 2.14 10E6/UL (ref 3.8–5.2)
RBC # BLD AUTO: 2.16 10E6/UL (ref 3.8–5.2)
RBC # BLD AUTO: 2.29 10E6/UL (ref 3.8–5.2)
RBC MORPH BLD: ABNORMAL
RBC MORPH BLD: ABNORMAL
RETICS # AUTO: 0.19 10E6/UL (ref 0.03–0.1)
RETICS/RBC NFR AUTO: 8.9 % (ref 0.5–2)
SODIUM SERPL-SCNC: 131 MMOL/L (ref 135–145)
SODIUM SERPL-SCNC: 132 MMOL/L (ref 135–145)
TACROLIMUS BLD-MCNC: 6.3 UG/L (ref 5–15)
TME LAST DOSE: NORMAL H
TME LAST DOSE: NORMAL H
WBC # BLD AUTO: 28 10E3/UL (ref 4–11)
WBC # BLD AUTO: 29.1 10E3/UL (ref 4–11)
WBC # BLD AUTO: 30.3 10E3/UL (ref 4–11)
WBC # FLD AUTO: 362 /UL

## 2024-07-03 PROCEDURE — 250N000011 HC RX IP 250 OP 636: Performed by: STUDENT IN AN ORGANIZED HEALTH CARE EDUCATION/TRAINING PROGRAM

## 2024-07-03 PROCEDURE — 85027 COMPLETE CBC AUTOMATED: CPT | Performed by: NURSE PRACTITIONER

## 2024-07-03 PROCEDURE — 83615 LACTATE (LD) (LDH) ENZYME: CPT | Performed by: NURSE PRACTITIONER

## 2024-07-03 PROCEDURE — 85045 AUTOMATED RETICULOCYTE COUNT: CPT | Performed by: NURSE PRACTITIONER

## 2024-07-03 PROCEDURE — 250N000013 HC RX MED GY IP 250 OP 250 PS 637

## 2024-07-03 PROCEDURE — 258N000003 HC RX IP 258 OP 636

## 2024-07-03 PROCEDURE — 87040 BLOOD CULTURE FOR BACTERIA: CPT | Performed by: PHYSICIAN ASSISTANT

## 2024-07-03 PROCEDURE — 250N000013 HC RX MED GY IP 250 OP 250 PS 637: Performed by: NURSE PRACTITIONER

## 2024-07-03 PROCEDURE — 71045 X-RAY EXAM CHEST 1 VIEW: CPT

## 2024-07-03 PROCEDURE — 36415 COLL VENOUS BLD VENIPUNCTURE: CPT | Performed by: INTERNAL MEDICINE

## 2024-07-03 PROCEDURE — 85610 PROTHROMBIN TIME: CPT | Performed by: STUDENT IN AN ORGANIZED HEALTH CARE EDUCATION/TRAINING PROGRAM

## 2024-07-03 PROCEDURE — 86140 C-REACTIVE PROTEIN: CPT | Performed by: PHYSICIAN ASSISTANT

## 2024-07-03 PROCEDURE — 250N000011 HC RX IP 250 OP 636: Performed by: PHYSICIAN ASSISTANT

## 2024-07-03 PROCEDURE — 0W993ZX DRAINAGE OF RIGHT PLEURAL CAVITY, PERCUTANEOUS APPROACH, DIAGNOSTIC: ICD-10-PCS | Performed by: STUDENT IN AN ORGANIZED HEALTH CARE EDUCATION/TRAINING PROGRAM

## 2024-07-03 PROCEDURE — 83605 ASSAY OF LACTIC ACID: CPT | Performed by: PHYSICIAN ASSISTANT

## 2024-07-03 PROCEDURE — 84145 PROCALCITONIN (PCT): CPT | Performed by: PHYSICIAN ASSISTANT

## 2024-07-03 PROCEDURE — 99233 SBSQ HOSP IP/OBS HIGH 50: CPT | Mod: 24 | Performed by: INTERNAL MEDICINE

## 2024-07-03 PROCEDURE — 85730 THROMBOPLASTIN TIME PARTIAL: CPT | Performed by: STUDENT IN AN ORGANIZED HEALTH CARE EDUCATION/TRAINING PROGRAM

## 2024-07-03 PROCEDURE — 36415 COLL VENOUS BLD VENIPUNCTURE: CPT | Performed by: DERMATOLOGY

## 2024-07-03 PROCEDURE — 36415 COLL VENOUS BLD VENIPUNCTURE: CPT | Performed by: STUDENT IN AN ORGANIZED HEALTH CARE EDUCATION/TRAINING PROGRAM

## 2024-07-03 PROCEDURE — 86635 COCCIDIOIDES ANTIBODY: CPT | Performed by: INTERNAL MEDICINE

## 2024-07-03 PROCEDURE — 71045 X-RAY EXAM CHEST 1 VIEW: CPT | Mod: 26 | Performed by: RADIOLOGY

## 2024-07-03 PROCEDURE — 87102 FUNGUS ISOLATION CULTURE: CPT | Performed by: NURSE PRACTITIONER

## 2024-07-03 PROCEDURE — 99232 SBSQ HOSP IP/OBS MODERATE 35: CPT | Mod: GC | Performed by: DERMATOLOGY

## 2024-07-03 PROCEDURE — 80053 COMPREHEN METABOLIC PANEL: CPT | Performed by: NURSE PRACTITIONER

## 2024-07-03 PROCEDURE — 36415 COLL VENOUS BLD VENIPUNCTURE: CPT | Performed by: PSYCHIATRY & NEUROLOGY

## 2024-07-03 PROCEDURE — 83605 ASSAY OF LACTIC ACID: CPT | Performed by: PSYCHIATRY & NEUROLOGY

## 2024-07-03 PROCEDURE — P9047 ALBUMIN (HUMAN), 25%, 50ML: HCPCS | Performed by: PHYSICIAN ASSISTANT

## 2024-07-03 PROCEDURE — 120N000011 HC R&B TRANSPLANT UMMC

## 2024-07-03 PROCEDURE — 87075 CULTR BACTERIA EXCEPT BLOOD: CPT | Performed by: NURSE PRACTITIONER

## 2024-07-03 PROCEDURE — 84157 ASSAY OF PROTEIN OTHER: CPT | Performed by: NURSE PRACTITIONER

## 2024-07-03 PROCEDURE — 87205 SMEAR GRAM STAIN: CPT | Performed by: NURSE PRACTITIONER

## 2024-07-03 PROCEDURE — 85060 BLOOD SMEAR INTERPRETATION: CPT | Performed by: STUDENT IN AN ORGANIZED HEALTH CARE EDUCATION/TRAINING PROGRAM

## 2024-07-03 PROCEDURE — 85007 BL SMEAR W/DIFF WBC COUNT: CPT | Performed by: NURSE PRACTITIONER

## 2024-07-03 PROCEDURE — 250N000012 HC RX MED GY IP 250 OP 636 PS 637: Performed by: NURSE PRACTITIONER

## 2024-07-03 PROCEDURE — 89050 BODY FLUID CELL COUNT: CPT | Performed by: NURSE PRACTITIONER

## 2024-07-03 PROCEDURE — 83605 ASSAY OF LACTIC ACID: CPT | Performed by: DERMATOLOGY

## 2024-07-03 PROCEDURE — 250N000011 HC RX IP 250 OP 636: Performed by: NURSE PRACTITIONER

## 2024-07-03 PROCEDURE — 36415 COLL VENOUS BLD VENIPUNCTURE: CPT | Performed by: PHYSICIAN ASSISTANT

## 2024-07-03 PROCEDURE — 36415 COLL VENOUS BLD VENIPUNCTURE: CPT

## 2024-07-03 PROCEDURE — 83735 ASSAY OF MAGNESIUM: CPT | Performed by: NURSE PRACTITIONER

## 2024-07-03 PROCEDURE — 80197 ASSAY OF TACROLIMUS: CPT

## 2024-07-03 PROCEDURE — 80048 BASIC METABOLIC PNL TOTAL CA: CPT | Performed by: PHYSICIAN ASSISTANT

## 2024-07-03 PROCEDURE — 36415 COLL VENOUS BLD VENIPUNCTURE: CPT | Performed by: NURSE PRACTITIONER

## 2024-07-03 PROCEDURE — 32555 ASPIRATE PLEURA W/ IMAGING: CPT | Performed by: STUDENT IN AN ORGANIZED HEALTH CARE EDUCATION/TRAINING PROGRAM

## 2024-07-03 PROCEDURE — 84100 ASSAY OF PHOSPHORUS: CPT | Performed by: NURSE PRACTITIONER

## 2024-07-03 PROCEDURE — 85027 COMPLETE CBC AUTOMATED: CPT | Performed by: PHYSICIAN ASSISTANT

## 2024-07-03 RX ORDER — PSYLLIUM SEED (WITH DEXTROSE)
2 POWDER (GRAM) ORAL DAILY
Status: DISCONTINUED | OUTPATIENT
Start: 2024-07-03 | End: 2024-07-06 | Stop reason: HOSPADM

## 2024-07-03 RX ORDER — OXYCODONE HYDROCHLORIDE 5 MG/1
5 TABLET ORAL EVERY 8 HOURS PRN
Status: DISCONTINUED | OUTPATIENT
Start: 2024-07-03 | End: 2024-07-06 | Stop reason: HOSPADM

## 2024-07-03 RX ORDER — LOPERAMIDE HCL 2 MG
2 CAPSULE ORAL 4 TIMES DAILY PRN
Status: DISCONTINUED | OUTPATIENT
Start: 2024-07-03 | End: 2024-07-06 | Stop reason: HOSPADM

## 2024-07-03 RX ORDER — TACROLIMUS 1 MG/1
3 CAPSULE ORAL
Status: DISCONTINUED | OUTPATIENT
Start: 2024-07-03 | End: 2024-07-05

## 2024-07-03 RX ORDER — ALBUMIN (HUMAN) 12.5 G/50ML
25 SOLUTION INTRAVENOUS ONCE
Status: COMPLETED | OUTPATIENT
Start: 2024-07-03 | End: 2024-07-03

## 2024-07-03 RX ORDER — FUROSEMIDE 10 MG/ML
20 INJECTION INTRAMUSCULAR; INTRAVENOUS ONCE
Status: COMPLETED | OUTPATIENT
Start: 2024-07-03 | End: 2024-07-03

## 2024-07-03 RX ADMIN — MYCOPHENOLATE MOFETIL 750 MG: 250 CAPSULE ORAL at 08:03

## 2024-07-03 RX ADMIN — ASPIRIN 81 MG 81 MG: 81 TABLET ORAL at 08:03

## 2024-07-03 RX ADMIN — PIPERACILLIN AND TAZOBACTAM 4.5 G: 4; .5 INJECTION, POWDER, LYOPHILIZED, FOR SOLUTION INTRAVENOUS at 18:08

## 2024-07-03 RX ADMIN — ACETAMINOPHEN 650 MG: 325 TABLET, FILM COATED ORAL at 01:30

## 2024-07-03 RX ADMIN — Medication 2 WAFER: at 16:32

## 2024-07-03 RX ADMIN — OXYCODONE HYDROCHLORIDE 5 MG: 5 TABLET ORAL at 01:30

## 2024-07-03 RX ADMIN — HEPARIN SODIUM 5000 UNITS: 5000 INJECTION, SOLUTION INTRAVENOUS; SUBCUTANEOUS at 12:54

## 2024-07-03 RX ADMIN — TACROLIMUS 3 MG: 1 CAPSULE ORAL at 18:08

## 2024-07-03 RX ADMIN — VANCOMYCIN HYDROCHLORIDE 1000 MG: 1 INJECTION, SOLUTION INTRAVENOUS at 14:03

## 2024-07-03 RX ADMIN — TACROLIMUS 2 MG: 1 CAPSULE ORAL at 08:03

## 2024-07-03 RX ADMIN — PIPERACILLIN AND TAZOBACTAM 4.5 G: 4; .5 INJECTION, POWDER, LYOPHILIZED, FOR SOLUTION INTRAVENOUS at 06:09

## 2024-07-03 RX ADMIN — PREDNISONE 5 MG: 5 TABLET ORAL at 08:07

## 2024-07-03 RX ADMIN — PIPERACILLIN AND TAZOBACTAM 4.5 G: 4; .5 INJECTION, POWDER, LYOPHILIZED, FOR SOLUTION INTRAVENOUS at 12:54

## 2024-07-03 RX ADMIN — HEPARIN SODIUM 5000 UNITS: 5000 INJECTION, SOLUTION INTRAVENOUS; SUBCUTANEOUS at 20:43

## 2024-07-03 RX ADMIN — SODIUM BICARBONATE 650 MG TABLET 650 MG: at 08:08

## 2024-07-03 RX ADMIN — SODIUM CHLORIDE, POTASSIUM CHLORIDE, SODIUM LACTATE AND CALCIUM CHLORIDE 500 ML: 600; 310; 30; 20 INJECTION, SOLUTION INTRAVENOUS at 01:30

## 2024-07-03 RX ADMIN — URSODIOL 300 MG: 300 CAPSULE ORAL at 20:43

## 2024-07-03 RX ADMIN — PANTOPRAZOLE SODIUM 40 MG: 40 TABLET, DELAYED RELEASE ORAL at 08:08

## 2024-07-03 RX ADMIN — VALGANCICLOVIR HYDROCHLORIDE 450 MG: 450 TABLET ORAL at 08:03

## 2024-07-03 RX ADMIN — ALBUMIN HUMAN 25 G: 0.25 SOLUTION INTRAVENOUS at 22:13

## 2024-07-03 RX ADMIN — URSODIOL 300 MG: 300 CAPSULE ORAL at 08:08

## 2024-07-03 RX ADMIN — TRIAMCINOLONE ACETONIDE: 1 OINTMENT TOPICAL at 10:23

## 2024-07-03 RX ADMIN — SODIUM PHOSPHATE, DIBASIC, ANHYDROUS, POTASSIUM PHOSPHATE, MONOBASIC, AND SODIUM PHOSPHATE, MONOBASIC, MONOHYDRATE 500 MG: 852; 155; 130 TABLET, COATED ORAL at 20:43

## 2024-07-03 RX ADMIN — MYCOPHENOLATE MOFETIL 750 MG: 250 CAPSULE ORAL at 20:43

## 2024-07-03 RX ADMIN — LOPERAMIDE HYDROCHLORIDE 2 MG: 2 CAPSULE ORAL at 16:22

## 2024-07-03 RX ADMIN — HEPARIN SODIUM 5000 UNITS: 5000 INJECTION, SOLUTION INTRAVENOUS; SUBCUTANEOUS at 06:09

## 2024-07-03 RX ADMIN — SODIUM BICARBONATE 650 MG TABLET 650 MG: at 20:43

## 2024-07-03 RX ADMIN — TRAZODONE HYDROCHLORIDE 50 MG: 50 TABLET ORAL at 22:48

## 2024-07-03 RX ADMIN — TRIAMCINOLONE ACETONIDE: 1 OINTMENT TOPICAL at 20:48

## 2024-07-03 RX ADMIN — SODIUM PHOSPHATE, DIBASIC, ANHYDROUS, POTASSIUM PHOSPHATE, MONOBASIC, AND SODIUM PHOSPHATE, MONOBASIC, MONOHYDRATE 500 MG: 852; 155; 130 TABLET, COATED ORAL at 08:07

## 2024-07-03 RX ADMIN — MAGNESIUM OXIDE TAB 400 MG (241.3 MG ELEMENTAL MG) 400 MG: 400 (241.3 MG) TAB at 12:54

## 2024-07-03 RX ADMIN — VANCOMYCIN HYDROCHLORIDE 1000 MG: 1 INJECTION, SOLUTION INTRAVENOUS at 01:08

## 2024-07-03 RX ADMIN — ACETAMINOPHEN 650 MG: 325 TABLET, FILM COATED ORAL at 08:08

## 2024-07-03 RX ADMIN — FUROSEMIDE 20 MG: 10 INJECTION, SOLUTION INTRAMUSCULAR; INTRAVENOUS at 10:22

## 2024-07-03 RX ADMIN — SODIUM CHLORIDE: 9 INJECTION, SOLUTION INTRAVENOUS at 03:17

## 2024-07-03 NOTE — PLAN OF CARE
Goal Outcome Evaluation:    [id number]     /60 (BP Location: Left arm)   Pulse 100   Temp 98.9  F (37.2  C) (Oral)   Resp 18   Wt 87.4 kg (192 lb 11.2 oz)   LMP 05/31/2006   SpO2 97%   BMI 32.07 kg/m        [id number]  PT noted to be comfortable on this shift, VSS, did c/o pain PRN tylenol and PRN oxycodone administer. Lactic acid 2.1, updated MD, bolus administer with IV intermittent ABX, recheck lactic 2.0. PIV infusing NS @ 100 ml/hr, voiding spon, no bm noted on this shift. Clamshell, stapled, KRISTIAN. Left side cellulitis, generalized rash, tx cream apply. Cont POC.        Pending stool sample on enteric precaution.

## 2024-07-03 NOTE — PROGRESS NOTES
Nursing paged regarding multiple watery stools of patient x5+. Went to see her as ddx includes abx rxn, crohn's flare, or viral illness. Likely not crohn's as pt states her current symptoms are not like her previous flares at all; abdomen was soft and non-distended and stool per patient was without mucus. Ordered viral panel to r/o viral cause of illness, however more likely 2/2 to abx.    Received page from charge nurse regarding her minor-ly elevated lactate of 2.1. Went to see patient immediately and did a thorough physical exam. Patient slightly tachycardic to 100s and hypotensive compared to her previous blood pressures at 100s/60s. This is likely d/t patient's fluid status being down 2/2 to her chronic watery w/o bloody diarrhea and her inability to match her fluid loss with PO intake. Ordered 500cc bolus.     Fab Velazquez MD   General Surgery PGY-1   [facility]

## 2024-07-03 NOTE — PROGRESS NOTES
/62 (BP Location: Left arm)   Pulse 95   Temp 98.5  F (36.9  C) (Oral)   Resp 16   Wt 87.4 kg (192 lb 11.2 oz)   LMP 05/31/2006   SpO2 98%   BMI 32.07 kg/m      Shift: 0360-4606  Isolation Status: Enteric  VS: Stable on room air, afebrile  Neuro: Aox4  Behaviors: calm, cooperative. Expresses need for sleep; cluster cares.   BG: n/a   Respiratory: WDL  Cardiac: WDL  Pain/Nausea: pain 4/10. Pt. Denies nausea.   PRN: none given  Diet: Regular  IV Access: PIV  Infusion(s): NS at 100; Vancomycin currently infusing  Lines/Drains: none  GI/: frequent, urgent, watery stools. Metamucil ordered to combat watery and stools. Voids spontaneously without difficulty.   Skin:  Clamshell, stapled, KRISTIAN. Generalized rash.   Mobility: UAL  Plan: continue care plan

## 2024-07-03 NOTE — TELEPHONE ENCOUNTER
Form signed by AVA Macdonald  [phone]  Sent to [facility]  And filed in [facility].

## 2024-07-03 NOTE — CONSULTS
[facility] Inpatient Consult Dermatology Note    Impression/Plan:  # Symmetric drug-related intertriginous and flexural exanthema (SDRIFE) , improving   # Asymptomatic well demarcated pink plaques favoring the intertriginous and flexural creases   At this time feel that her symptoms represent symmetric drug-related intertriginous and flexural exanthema (SDRIFE). The onset of the rash follows systemic exposure to a medication, with numerous possible culprits.   The latency period could range from hours to days after exposure and can even occur after cessation of medication. The pathogenesis is unknown however it is speculated that a T cell-mediated delayed-type hypersensitivity reaction may mitigate the eruption and that increased drug excretion on the skin folds, which have a high density of eccrine glands, could explain the distribution of the rash.  The patient has a few potential culprits including antibiotics, NSAIDs, opiates, hydroxyzine while hospitalized, she also received infliximab for her Crohn's 6/12 and had recent contrast, all of which could be implicated. Fortunately SDRIFE is self limited and symptomatic relief is mainstay. Her rash continues to be improve with topicals. No indication for skin biopsy a this time. She has some significant edema 07/03/24 and continues to be worked up by ID. We will watch her closely for progression, please page with any concerns.    - no evidence of progression on exam 07/03/24, no pustules or other changes in morphology, does have significant edema   - continue topical triamcinolone ointment 0.1 % BID to areas of involvement    - unfortunately was not started until AM 07/02/24   - no indication for antibiotics from dermatologic standpoint   - no indication for skin biopsy at this time   - Dermatology will continue to follow as course progresses     # Anasarca   # ?early formation of small edema bulla  - agree with diuresis per primary team   -  topical triamcinolone ointment 0.1 % BID to LE     #Eosinophilia   - unlikely to be related to skin findings at this time, agree with pursuing work up of other causes     Other medical history:   #History of  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 We will continue to follow.     Staffed with attending physician, Dr. Landon Colindres, DO  Dermatology Resident     I have seen and examined this patient and agree with the assessment and plan as documented in the resident's note.    Isaac Navarrete MD  Dermatology Attending    Dermatology Problem List:  # Probable Symmetric drug-related intertriginous and flexural exanthema (SDRIFE)   # Asymptomatic well demarcated pink plaques favoring the intertriginous and flexural creases     Subjective:  Feeling swollen today , no fevers , reports that rash is nearly resolved in her opinion   She notes that the swelling seemed to worsen overnight but had been occurring over the preceding days   Does not report any other new concerns or complaints     Physical exam:  Vitals: /63 (BP Location: Left arm)   Pulse 105   Temp 98.8  F (37.1  C) (Oral)   Resp 16   Wt 87.4 kg (192 lb 11.2 oz)   LMP 2006   SpO2 100%   BMI 32.07 kg/m    GEN: This is a well developed, well-nourished female in no acute distress, in a pleasant mood.    SKIN: Total skin excluding the undergarment areas was performed. The exam included the head/face, neck, both arms, chest, back, abdomen, both legs, digits and/or nails.   -Mcmullen skin type: II  -Faint pink patches to the bilateral groin and axilla, otherwise no plaques or patches noted in areas of previous involvement   -Bilateral 3+ pitting LE edema, ?early formation of small  edema bulla  -No other lesions of concern on areas examined.                                       Laboratory:  Results for orders placed or performed during the hospital encounter of 06/30/24 (from the past 24 hour(s))   Lactic Acid Whole Blood w/ 1x repeat in 2 hrs when >2   Result Value Ref Range    Lactic Acid, Initial 2.1 (H) 0.7 - 2.0 mmol/L   Lactic acid whole blood   Result Value Ref Range    Lactic Acid 2.0 0.7 - 2.0 mmol/L   CBC with Platelets & Differential    Narrative    The following orders were created for panel order CBC with Platelets & Differential.  Procedure                               Abnormality         Status                     ---------                               -----------         ------                     CBC with platelets and d...[[id number]]  Abnormal            Final result               Manual Differential[[id number]]          Abnormal            Final result                 Please view results for these tests on the individual orders.   Tacrolimus by Tandem Mass Spectrometry   Result Value Ref Range    Tacrolimus by Tandem Mass Spectrometry 6.3 5.0 - 15.0 ug/L    Tacrolimus Last Dose Date      Tacrolimus Last Dose Time      Narrative    This test was developed and its performance characteristics determined by the [facility],  Special Chemistry Laboratory. It has not been cleared or approved by the FDA. The laboratory is regulated under CLIA as qualified to perform high-complexity testing. This test is used for clinical purposes. It should not be regarded as investigational or for research.   Basic metabolic panel   Result Value Ref Range    Sodium 132 (L) 135 - 145 mmol/L    Potassium 3.6 3.4 - 5.3 mmol/L    Chloride 104 98 - 107 mmol/L    Carbon Dioxide (CO2) 18 (L) 22 - 29 mmol/L    Anion Gap 10 7 - 15 mmol/L    Urea Nitrogen 6.7 (L) 8.0 - 23.0 mg/dL    Creatinine 0.63 0.51 - 0.95 mg/dL    GFR Estimate >90 >60 mL/min/1.73m2    Calcium 7.0 (L) 8.8 -  10.2 mg/dL    Glucose 103 (H) 70 - 99 mg/dL   Hepatic function panel   Result Value Ref Range    Protein Total 5.0 (L) 6.4 - 8.3 g/dL    Albumin 2.8 (L) 3.5 - 5.2 g/dL    Bilirubin Total 1.9 (H) <=1.2 mg/dL    Alkaline Phosphatase 127 40 - 150 U/L    AST 26 0 - 45 U/L    ALT 56 (H) 0 - 50 U/L    Bilirubin Direct 0.91 (H) 0.00 - 0.30 mg/dL   Magnesium   Result Value Ref Range    Magnesium 1.9 1.7 - 2.3 mg/dL   Phosphorus   Result Value Ref Range    Phosphorus 1.9 (L) 2.5 - 4.5 mg/dL   CBC with platelets and differential   Result Value Ref Range    WBC Count 29.1 (H) 4.0 - 11.0 10e3/uL    RBC Count 2.29 (L) 3.80 - 5.20 10e6/uL    Hemoglobin 7.7 (L) 11.7 - 15.7 g/dL    Hematocrit 22.9 (L) 35.0 - 47.0 %     78 - 100 fL    MCH 33.6 (H) 26.5 - 33.0 pg    MCHC 33.6 31.5 - 36.5 g/dL    RDW 18.8 (H) 10.0 - 15.0 %    Platelet Count 330 150 - 450 10e3/uL    % Neutrophils      % Lymphocytes      % Monocytes      % Eosinophils      % Basophils      % Immature Granulocytes      NRBCs per 100 WBC 0 <1 /100    Absolute Neutrophils      Absolute Lymphocytes      Absolute Monocytes      Absolute Eosinophils      Absolute Basophils      Absolute Immature Granulocytes      Absolute NRBCs 0.1 10e3/uL   Manual Differential   Result Value Ref Range    % Neutrophils 86 %    % Lymphocytes 5 %    % Monocytes 2 %    % Eosinophils 7 %    % Basophils 0 %    Absolute Neutrophils 25.0 (H) 1.6 - 8.3 10e3/uL    Absolute Lymphocytes 1.5 0.8 - 5.3 10e3/uL    Absolute Monocytes 0.6 0.0 - 1.3 10e3/uL    Absolute Eosinophils 2.0 (H) 0.0 - 0.7 10e3/uL    Absolute Basophils 0.0 0.0 - 0.2 10e3/uL    RBC Morphology Confirmed RBC Indices     Platelet Assessment  Automated Count Confirmed. Platelet morphology is normal.     Automated Count Confirmed. Platelet morphology is normal.    Polychromasia Slight (A) None Seen   Lab Blood Morphology Pathologist Review    Narrative    The following orders were created for panel order Lab Blood Morphology  Pathologist Review.  Procedure                               Abnormality         Status                     ---------                               -----------         ------                     Bld morphology pathology...[[id number]]                      In process                 CBC with platelets and d...[[id number]]  Abnormal            Final result               Manual Differential[[id number]]          Abnormal            Final result               Reticulocyte count[[id number]]           Abnormal            Final result               Morphology Tracking[[id number]]                              Final result                 Please view results for these tests on the individual orders.   CBC with platelets and differential   Result Value Ref Range    WBC Count 28.0 (H) 4.0 - 11.0 10e3/uL    RBC Count 2.14 (L) 3.80 - 5.20 10e6/uL    Hemoglobin 7.1 (L) 11.7 - 15.7 g/dL    Hematocrit 21.6 (L) 35.0 - 47.0 %     (H) 78 - 100 fL    MCH 33.2 (H) 26.5 - 33.0 pg    MCHC 32.9 31.5 - 36.5 g/dL    RDW 18.8 (H) 10.0 - 15.0 %    Platelet Count 312 150 - 450 10e3/uL    % Neutrophils      % Lymphocytes      % Monocytes      % Eosinophils      % Basophils      % Immature Granulocytes      NRBCs per 100 WBC 0 <1 /100    Absolute Neutrophils      Absolute Lymphocytes      Absolute Monocytes      Absolute Eosinophils      Absolute Basophils      Absolute Immature Granulocytes      Absolute NRBCs 0.0 10e3/uL   Reticulocyte count   Result Value Ref Range    % Reticulocyte 8.9 (H) 0.5 - 2.0 %    Absolute Reticulocyte 0.190 (H) 0.025 - 0.095 10e6/uL   Manual Differential   Result Value Ref Range    % Neutrophils 92 %    % Lymphocytes 3 %    % Monocytes 2 %    % Eosinophils 3 %    % Basophils 0 %    Absolute Neutrophils 25.8 (H) 1.6 - 8.3 10e3/uL    Absolute Lymphocytes 0.8 0.8 - 5.3 10e3/uL    Absolute Monocytes 0.6 0.0 - 1.3 10e3/uL    Absolute Eosinophils 0.8 (H) 0.0 - 0.7 10e3/uL    Absolute Basophils 0.0 0.0 - 0.2 10e3/uL    RBC  Morphology Confirmed RBC Indices     Platelet Assessment  Automated Count Confirmed. Platelet morphology is normal.     Automated Count Confirmed. Platelet morphology is normal.    Polychromasia Slight (A) None Seen   INR   Result Value Ref Range    INR 1.10 0.85 - 1.15   Partial thromboplastin time   Result Value Ref Range    aPTT 32 22 - 38 Seconds     *Note: Due to a large number of results and/or encounters for the requested time period, some results have not been displayed. A complete set of results can be found in Results Review.     Dr. Navarrete staffed the patient.    Staff Involved:  Resident/Staff

## 2024-07-03 NOTE — TELEPHONE ENCOUNTER
Home Health Care      Reason for call:  [facility] - Order #[id number] - Cert period: 06/29/24 - 08/27/24 - Hold home health services - pt in hospital    Orders are needed for this patient.  above    Pt Provider: Renae    Phone Number Homecare Nurse can be reached at: Fax       Could we send this information to you in [place] or would you prefer to receive a phone call?:   na    Put in providers in box    Noemi K

## 2024-07-03 NOTE — PROVIDER NOTIFICATION
Juju in rm [phone] on 7A has a lactic of 2.1. What would you like to do?  Darshana RN,CN [phone]    Surgery cross cover, Dr. Velazquez, stated she will follow up after further assessing her.

## 2024-07-03 NOTE — PROVIDER NOTIFICATION
Provider notified (name): Fab JHA   Reason for notification: Lactic acid 2.1   Recommendation/request given to provider: inform doc   Response from provider:order obtain

## 2024-07-03 NOTE — PROCEDURES
[facility]  CAPS PROCEDURE NOTE  Date of Admission:  6/30/2024  Consult Requested by:  Liver transplant surgery  Reason for Consult: diagnostic evaluation of pleural effusion    Indication/HPI: 59-year-old woman with history of liver transplant 6/22/2024 with fevers. CAPS was consulted for diagnostic evaluation of pleural effusion.     Pre-Procedure Diagnosis: Right Pleural Effusion    Post-Procedure Diagnosis: Right Pleural Effusion    Risk Assessment: Average risk, Technically straightforward; patient's anticoagulation has been held according to guidelines based on the agent and platelets and coags are within guidelines    Procedure Outcome:  Diagnostic thoracentesis performed.   Pleural fluid appeared serosanguinous.     See additional procedure details below.    The primary covering service should follow up and address any lab results as appropriate.    Michelle Corralse MD  [facility]  Securely message with Vocera (more info)  Text page via [employer] Paging/Directory   See signed in provider for up to date coverage information      [facility]    Thoracentesis at Bedside    Date/Time: 7/3/2024 6:27 PM    Performed by: Michelle Corrales MD  Authorized by: Michelle Corrales MD      UNIVERSAL PROTOCOL   Site Marked: Yes  Prior Images Obtained and Reviewed:  Yes (Prior chest CT reviewed)  Required items: Required blood products, implants, devices and special equipment available    Patient identity confirmed:  Verbally with patient, arm band and hospital-assigned identification number  NA - No sedation, light sedation, or local anesthesia  Confirmation Checklist:  Patient's identity using two indicators  Time out: Immediately prior to the procedure a time out was called    Universal Protocol: the Joint Commission Universal Protocol was followed    Preparation: Patient was prepped  and draped in usual sterile fashion    ESBL (mL):  0    Procedure purpose: diagnostic  Indications: pleural effusion       ANESTHESIA    Anesthesia:  Local infiltration  Local Anesthetic:  Lidocaine 1% without epinephrine  Anesthetic Total (mL):  2      SEDATION    Patient Sedated: No      PROCEDURE DETAILS   Preparation: skin prepped with ChloraPrep  Patient position: sitting  Ultrasound guidance: yes  Location: right posterior  Intercostal space: 6th  Puncture method: needle only  Needle size: 20  Number of attempts: 1  Drainage amount: 30 ml  Drainage characteristics: serosanguinous  Chest x-ray performed: yes  Chest x-ray interpreted by radiologist.  Chest x-ray findings: Reduced effusion, no pneumothorax.      PROCEDURE    Patient Tolerance:  Patient tolerated the procedure well with no immediate complications  Length of time physician/provider present for 1:1 monitoring during sedation: 0        POC US GUIDE FOR THORACENTESIS     Impression  Ultrasound was used to visualize right-sided pleural effusion. Pocket was deep but spanning only one rib space, adequate for diagnostic but not therapeutic thoracentesis at this time.  Diagnostic thoracentesis completed.  Needle tip was visualized within the fluid.

## 2024-07-03 NOTE — PROGRESS NOTES
Transplant Infectious Diseases Inpatient Consultation      Abiola Matute MRN# [id number]   YOB: 1965 Age: 59 year old   Date of Admission and time: 6/30/2024 10:43 AM     Reason for consult: Fever, leukocytosis         Recommendations:   Agree with R-sided thoracentesis today to evaluate for infection.   Please send for cell count with differential, LDH, total protein, bacterial/fungal cultures   Continue vanc and pip/tazo pending thoracentesis results.   If R-sided effusion as a transudate, would stop all antibiotics and watchfully wait  Consider hematology consult, given increasing eosinophilia  Repeat daily CBC with diff to trend eosinophils  I have added toxoplasma and Coccidiodes antibodies, Coccidiodes antigen to previous blood draws in the very unlikely chance these are positive   Continue dapsone for PJP PPx and VGCV for CMV PPx    Transplant infectious diseases will continue to follow with you. Dr. Song will be covering the service tomorrow (7/4) and will not plan on seeing the patient unless asked. I will resume the service on Friday (7/5).     Attestation:  Total duration of visit including chart review, reviewing labs and imaging, interviewing and examining the patient, documentation, and sending communication to the primary treating team, all at the same day of this encounter, is: 52 minutes.       Lisa Gilbert MD  Transplant Infectious Diseases Attending  [phone]          Synopsis of Clinical Presentation and Course   Transplant Summary  Transplants:  6/22/2024 (Liver); POD  11.  Coordinator Zayra Paulino  Reason for Transplantation: DUNN/EtOH  Current Immunosuppression: Prednisone 5 mg/day, tacrolimus,  mg BID.     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 She was discharged on 6/28/24 and re-admitted on 6/30/24 with fevers to 101 and  patchy induratio non RUE/axilla/flank. We are being consulted for assistance with infectious diseases work-up.     Patient noted she was at home after discharge. She developed fevers to >101 F with induration on RUE/axilla/flank that appeared the day before admission. Work-up remarkable for WBC 26.0 with lactate of 3.3. Started on vanc and pip/tazo. Blood cultures from 6/30 and 7/1 remain NGTD. CT C/A/P (6/30/21) with small to moderate R-sided pleural effusion with R-sided groundglass density, small volume ascites, and 6.8 x 2.4 cm collection in medial to R hemiliver consistent with resorbing hematoma.     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 CT A/P from 6/30/14 with a R pleural effusion, a 5.4 x1.5 cm seroma vs hematoma in R flank, 6.8 x 2.4 cm resolving hematoma around the transplanted liver, and small volume ascites in the pelvis. Any of these fluid collections could be a source of infection. At this point, plan for R sided thoracentesis to ensure no empyema. Transplant does not feel that there is a concern for infection in her perihepatic fluid collection. She doesn't have any significant respiratory symptoms (cough, sputum production), so this is not consistent with a pneumonia or a respiratory viral infection. She does have a rash and eosinophilia, but per dermatology this is not consistent with a DRESS syndrome. Tbili is stable at 1.8, so less likely related to biliary source. Fortunately, her fever has not returned since hospital admission. CMV VL is negative.     Eosinophilia   At this point, unclear etiology. 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 They do not think her presentation is consistent with a DRESS syndrome. She has not traveled to any strongyloides-endemic region (including the [facility]). She has not worked in an industry in which she was exposed to ascariasis. Coccidioidomycosis can cause eosinophilia. She spent 1 week in [place] about 4 years ago, so I will send Antigen and Antibodies. She eats venison that her  dresses, but always cooks this. Toxoplasmosis can occasionally cause eosinophilia and has been associated with venison, so will send toxoplasma Ab. Eosinophilia could be related to drug reaction (pantoprazole and dapsone have both been associated; pip/tazo and vancomycin are also associated with this, but they have been started recently so less likely). At this point, recommend obtaining peripheral smear and consider heme consult.     Transplant Checklist  - QTc interval: ?  - Pneumocystis prophylaxis:  On dapsone. H/o hives with sulfa  - Bacterial prophylaxis: None  - Fungal prophylaxis: None  - Serostatus & viral prophylaxis: CMV D-/R+, HSV ?, EBV D+/R+ Valgancyclovir  - Immunization status:  Readdress at 3 months post-transplant  - Gamma globulin status: No lab results found.  - Antibiotic allergies: Hives with sulfa and pain/itching with metronidazole. Should have allergy consult in coming months to assess for ongoing sulfa allergy.          Old Problems and Infectious Diseases Issues:   N/A          Interval Events    Feeling like her rash has improved today.   Eosinophilia has increased to 2.0 today     Abx  Branden/tazo: 6/30-current  Vancomycin: 6/30-current               Immunizations:     Immunization History   Administered Date(s) Administered    COVID-19 MONOVALENT 12+ (Pfizer) 04/05/2021, 04/21/2021, 12/22/2021    Influenza (IIV3) PF 11/01/2014     Influenza Vaccine 18-64 (Flublok) 10/03/2019, 09/15/2021, 11/11/2022    Influenza Vaccine >6 months,quad, PF 11/02/2016, 09/04/2020, 02/07/2024    Influenza Vaccine, 6+MO IM (QUADRIVALENT W/PRESERVATIVES) 09/23/2015, 10/05/2017    MMR 04/24/2007    Pneumococcal 20 valent Conjugate (Prevnar 20) 11/11/2022    Pneumococcal 23 valent 09/04/2020    TD,PF 7+ (Tenivac) 09/16/1999    TDAP Vaccine (Adacel) 09/04/2020    TDAP Vaccine (Boostrix) 04/07/2010    Zoster recombinant adjuvanted (SHINGRIX) 09/08/2020, 11/04/2020            Allergies:     Allergies   Allergen Reactions    Fish Oil      Redness and itching around eye area only - went away when fish oil capsules stopped     Metronidazole      pain/itching    Ppd [Tuberculin Purified Protein Derivative]     Sulfa Antibiotics      hives    Terbinafine And Related      Lamisil = mild urticarial reaction             Medications:   Medications that Require Transfusion:   Current Facility-Administered Medications   Medication Dose Route Frequency Provider Last Rate Last Admin     Scheduled Medications:   Current Facility-Administered Medications   Medication Dose Route Frequency Provider Last Rate Last Admin    aspirin (ASA) chewable tablet 81 mg  81 mg Oral Daily Miguelina Weinstein APRN CNP   81 mg at 07/03/24 0803    [Held by provider] dapsone (ACZONE) tablet 50 mg  50 mg Oral or Feeding Tube Daily Philly Webb NP   50 mg at 07/02/24 0828    furosemide (LASIX) injection 20 mg  20 mg Intravenous Once Philly Webb NP        heparin ANTICOAGULANT injection 5,000 Units  5,000 Units Subcutaneous Q8H Philly Webb NP   5,000 Units at 07/03/24 0609    magnesium oxide (MAG-OX) tablet 400 mg  400 mg Oral Daily Philly Webb NP   400 mg at 07/02/24 1315    mycophenolate (GENERIC EQUIVALENT) capsule 750 mg  750 mg Oral BID Miguelina Weinstein APRN CNP   750 mg at 07/03/24 0803    pantoprazole (PROTONIX) EC tablet 40 mg  40 mg Oral Daily Jon  Philly Becker NP   40 mg at 07/03/24 0808    phosphorus tablet 250 mg (PHOSPHA 250 NEUTRAL) per tablet 500 mg  500 mg Oral BID Philly Webb NP   500 mg at 07/03/24 0807    piperacillin-tazobactam (ZOSYN) 4.5 g vial to attach to  mL bag  4.5 g Intravenous Q6H Soledad Gomez MD 0 mL/hr at 06/30/24 2033 4.5 g at 07/03/24 0609    predniSONE (DELTASONE) tablet 5 mg  5 mg Oral Daily Miguelina Weinstein APRN CNP   5 mg at 07/03/24 0807    sodium bicarbonate tablet 650 mg  650 mg Oral BID Philly Webb NP   650 mg at 07/03/24 0808    sodium chloride (PF) 0.9% PF flush 3 mL  3 mL Intracatheter Q8H Soledad Gomez MD   3 mL at 07/02/24 1317    tacrolimus (GENERIC EQUIVALENT) capsule 2 mg  2 mg Oral BID IS Philly Webb NP   2 mg at 07/03/24 0803    traZODone (DESYREL) half-tab 25-50 mg  25-50 mg Oral At Bedtime Fab Velazquez MD   50 mg at 07/02/24 2246    Or    traZODone (DESYREL) tablet 50 mg  50 mg Oral At Bedtime Fab Velazquez MD   50 mg at 07/01/24 2146    triamcinolone (KENALOG) 0.1 % ointment   Topical BID Philly Webb NP   Given at 07/02/24 2028    ursodiol (ACTIGALL) capsule 300 mg  300 mg Oral or Feeding Tube BID Philly Webb NP   300 mg at 07/03/24 0808    valGANciclovir (VALCYTE) tablet 450 mg  450 mg Oral Daily Miguelina Weinstein APRN CNP   450 mg at 07/03/24 0803    vancomycin (VANCOCIN) 1,000 mg in 200 mL dextrose intermittent infusion  1,000 mg Intravenous Q12H Soledad Gomez  mL/hr at 07/03/24 0108 1,000 mg at 07/03/24 0108          Physical Exam     Physical Exam     Vital signs:  /60 (BP Location: Left arm)   Pulse 100   Temp 98.9  F (37.2  C) (Oral)   Resp 18   Wt 87.4 kg (192 lb 11.2 oz)   LMP 05/31/2006   SpO2 97%   BMI 32.07 kg/m      GENERAL: alert and no distress  NECK: no adenopathy, no asymmetry, masses, or scars  RESP: lungs clear to auscultation - no rales, rhonchi or wheezes  CV: regular rate and rhythm, normal S1 S2, no S3  or S4, no murmur, click or rub, no peripheral edema. Surgical incision site is well-healing and without drainage or erythema.   ABDOMEN: soft, nontender, no hepatosplenomegaly, no masses and bowel sounds normal  MS: no gross musculoskeletal defects noted, no edema  SKIN: Erythematous rash on flexor surfaces of arms, intertriginous areas.     Data     Data   Laboratory data and imaging listed below was reviewed prior to this encounter.     Microbiology:    Culture   Date Value Ref Range Status   07/01/2024 No Growth  Final   07/01/2024 No growth after 1 day  Preliminary   07/01/2024 No growth after 1 day  Preliminary   06/30/2024 No growth after 2 days  Preliminary   06/30/2024 No growth after 2 days  Preliminary   06/22/2024 No anaerobic organisms isolated  Final   06/22/2024 No growth after 10 days  Preliminary   06/22/2024 No Growth  Final   06/20/2024 <10,000 CFU/mL Mixture of Urogenital Gayla  Final   06/20/2024   Final    No Vancomycin Resistant Enterococcus species isolated     GS Culture   Date Value Ref Range Status   06/22/2024 See corresponding culture for results  Final   , CD4: No lab results found. and HIV RNA: No lab results found., Hepatitis B Testing:   Recent Labs   Lab Test 06/20/24  1651 02/14/23  0824   HBCAB Nonreactive Nonreactive   HEPBANG Nonreactive Nonreactive   , Hepatitis C Testing:   Hepatitis C Antibody   Date Value Ref Range Status   06/20/2024 Nonreactive Nonreactive Final     Comment:     A nonreactive screening test result does not exclude the possibility of exposure to or infection with HCV. Nonreactive screening test results in individuals with prior exposure to HCV may be due to antibody levels below the limit of detection of this assay or lack of reactivity to the HCV antigens used in this assay. Patients with recent HCV infections (<3 months from time of exposure) may have false-negative HCV antibody results due to the time needed for seroconversion (average of 8 to 9 weeks).    02/14/2023 Nonreactive Nonreactive Final   05/20/2014 Negative NEG Final   , HSV 1/2 IgG: No lab results found. and VZV IgG: No lab results found., CMV IgG:   Recent Labs   Lab Test 06/20/24  1651 02/14/23  0824   CMVIGGIV <0.20 <0.20   CMVIGG No detectable antibody. No detectable antibody.   , and EBV EA IgG: No lab results found.                    Lisa Gilbert MD  [facility]  Contact information available via [facility] Paging/Directory     07/03/2024

## 2024-07-03 NOTE — PLAN OF CARE
Problem: Adult Inpatient Plan of Care  Goal: Absence of Hospital-Acquired Illness or Injury  Outcome: Progressing  Intervention: Identify and Manage Fall Risk  Recent Flowsheet Documentation  Taken 7/3/2024 0812 by Sariah Boothe  Safety Promotion/Fall Prevention:   activity supervised   assistive device/personal items within reach  Intervention: Prevent Skin Injury  Recent Flowsheet Documentation  Taken 7/3/2024 0812 by Sariah Boothe  Body Position: position changed independently  Intervention: Prevent and Manage VTE (Venous Thromboembolism) Risk  Recent Flowsheet Documentation  Taken 7/3/2024 0812 by Sariah Boothe  VTE Prevention/Management: SCDs off (sequential compression devices)  Intervention: Prevent Infection  Recent Flowsheet Documentation  Taken 7/3/2024 0812 by Sariah Boothe  Infection Prevention:   cohorting utilized   environmental surveillance performed   hand hygiene promoted   rest/sleep promoted   single patient room provided  Goal: Optimal Comfort and Wellbeing  Outcome: Progressing  Goal: Readiness for Transition of Care  Outcome: Progressing  Flowsheets (Taken 7/3/2024 1250)  Transportation Anticipated: family or friend will provide  Concerns to be Addressed: discharge planning  Intervention: Mutually Develop Transition Plan  Recent Flowsheet Documentation  Taken 7/3/2024 1250 by Sariah Boothe  Transportation Anticipated: family or friend will provide  Concerns to be Addressed: discharge planning     Problem: Pain Acute  Goal: Optimal Pain Control and Function  Outcome: Progressing  Intervention: Prevent or Manage Pain  Recent Flowsheet Documentation  Taken 7/3/2024 0812 by Sariah Boothe  Sensory Stimulation Regulation:   care clustered   lighting decreased   quiet environment promoted  Medication Review/Management: medications reviewed     Problem: Infection  Goal: Absence of Infection Signs and Symptoms  Outcome: Progressing   Goal Outcome Evaluation:

## 2024-07-03 NOTE — PROGRESS NOTES
Per 's lab result note from 12/13/2023, patient due to receive 100mg testosterone injection every 3 weeks.   Patient received injection IM LUOQ today without incident.   Testosterone brought to office from pharmacy by patient .   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 Other history includes osteopenia, LE cellulitis, crohn's disease on infliximab, and obesity. She underwent DCD DDLT w/ biliary stent placement (on OCS x 19.4 hours) on 6/22/24 with Dr. Ball. She discharged 6/28 and now presents with fever and new patchy induration to extremities and flank.     s/p DCD DDLT +stent 6/22/24: POD#11. LFTs continue to downtrend overall.   - Continue ASA 81 mg daily and ursodiol 300 BID.      Immunosuppressed status secondary to medications:   Maintenance:    -  mg BID   - Tacrolimus 2 mg BID. Goal level 8-12.    - Prednisone 5 mg daily     Neuro:  Acute post op pain: Continue PRN Tylenol, Robaxin, oxycodone (weaning).   Insomnia: Trazodone at bedtime.       Hematology:   Acute blood loss anemia: Hgb 8-9, stable.  Eosinophilia: Send peripheral smear.      Cardiorespiratory: No issues.      GI/Nutrition:   Moderate malnutrition in the context of chronic illness: due to mild muscle loss, fluid accumulation, low albumin. Continue Regular diet with protein supplements.    Endocrine: No issues.      Fluid/Electrolytes:   Hyponatremia: Na 132, improving.  Hypomagnesemia: Continue Mag-Ox 400 mg daily.  Metabolic acidosis: Continue sodium bicarb 650 mg BID.   Hypophosphatemia: Continue phos 500 mg BID.   Generalized edema: Stop IVF. Give Lasix x1 and evaluation response.      Infectious disease:   Fevers of unknown origin: Admitted with fevers and leukocytosis. Infectious workup negative to date.    - Continue Vanc/Zosyn.   - Transplant ID consulted.    - Diagnostic thoracentesis ordered for right pleural effusion noted on CT.     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 Dermatology consulted, likely SDRIFE.    - Start topical triamcinolone ointment 0.1% BID   - HOLD dapsone (has sulfa allergy)     Prophylaxis: DVT (mechanical, subcutaneous heparin), fall, GI (PPI), viral (Valcyte), pneumocystis (sulfa allergy, G6PD normal, HOLD dapsone)     Disposition: 7A, PT consulted    DINESH/Fellow/Resident Provider: Philly Webb NP #[id number]     Faculty: Benedicto Elizalde M.D.    __________________________________________________________________  Transplant History:    6/22/2024 (Liver), Postoperative day: 11     Interval History: History is obtained from the patient  Overnight events: No acute events overnight. Remains afebrile on empiric antibiotics. Patient reports rash improving. Feels better today.      ROS:   A 10-point review of systems was negative except as noted above.    Curent Meds:  Current Facility-Administered Medications   Medication Dose Route Frequency Provider Last Rate Last Admin    aspirin (ASA) chewable tablet 81 mg  81 mg Oral Daily Miguelina Weinstein APRN CNP   81 mg at 07/03/24 0803    [Held by provider] dapsone (ACZONE) tablet 50 mg  50 mg Oral or Feeding Tube Daily Philly Webb NP   50 mg at 07/02/24 0828    heparin ANTICOAGULANT injection 5,000 Units  5,000 Units Subcutaneous Q8H Philly Webb NP   5,000 Units at 07/03/24 0609    magnesium oxide (MAG-OX) tablet 400 mg  400 mg Oral Daily Philly Webb NP   400 mg at 07/02/24 1315    mycophenolate (GENERIC EQUIVALENT) capsule 750 mg  750 mg Oral BID Miguelina Weinstein APRN CNP   750 mg at 07/03/24 0803    pantoprazole (PROTONIX) EC tablet 40 mg  40 mg Oral Daily Philly Webb NP   40 mg at 07/03/24 0808    phosphorus tablet 250 mg (PHOSPHA 250 NEUTRAL) per tablet 500 mg  500 mg Oral BID Phlily Webb, NP   500 mg at 07/03/24 0807    piperacillin-tazobactam (ZOSYN) 4.5 g vial to attach to  mL bag  4.5 g Intravenous Q6H Soledad Gomez MD 0 mL/hr  at 06/30/24 2033 4.5 g at 07/03/24 0609    predniSONE (DELTASONE) tablet 5 mg  5 mg Oral Daily Miguelina Weinstein APRN CNP   5 mg at 07/03/24 0807    sodium bicarbonate tablet 650 mg  650 mg Oral BID Philly Webb NP   650 mg at 07/03/24 0808    sodium chloride (PF) 0.9% PF flush 3 mL  3 mL Intracatheter Q8H Soledad Gomez MD   3 mL at 07/03/24 1023    tacrolimus (GENERIC EQUIVALENT) capsule 2 mg  2 mg Oral BID IS Philly Webb NP   2 mg at 07/03/24 0803    traZODone (DESYREL) half-tab 25-50 mg  25-50 mg Oral At Bedtime Fab Velazquez MD   50 mg at 07/02/24 2246    Or    traZODone (DESYREL) tablet 50 mg  50 mg Oral At Bedtime Fab Velazquez MD   50 mg at 07/01/24 2146    triamcinolone (KENALOG) 0.1 % ointment   Topical BID Philly Webb NP   Given at 07/03/24 1023    ursodiol (ACTIGALL) capsule 300 mg  300 mg Oral or Feeding Tube BID Philly Webb NP   300 mg at 07/03/24 0808    valGANciclovir (VALCYTE) tablet 450 mg  450 mg Oral Daily Miguelina Weinstein APRN CNP   450 mg at 07/03/24 0803    vancomycin (VANCOCIN) 1,000 mg in 200 mL dextrose intermittent infusion  1,000 mg Intravenous Q12H Soledad Gomez  mL/hr at 07/03/24 0108 1,000 mg at 07/03/24 0108       Physical Exam:     Current Vitals:   /63 (BP Location: Left arm)   Pulse 105   Temp 98.8  F (37.1  C) (Oral)   Resp 16   Wt 87.4 kg (192 lb 11.2 oz)   LMP 05/31/2006   SpO2 100%   BMI 32.07 kg/m      Vital sign ranges:    Temp:  [98.5  F (36.9  C)-98.9  F (37.2  C)] 98.8  F (37.1  C)  Pulse:  [] 105  Resp:  [16-20] 16  BP: (106-117)/(57-68) 106/63  SpO2:  [96 %-100 %] 100 %    General Appearance: in no apparent distress.   Skin: erythema noted bilaterally on extremities, axilla, flank, back improving  Heart: RRR  Lungs: unlabored on RA  Abdomen: soft, non-distended. Incision C/D/I.   : no Conti present  Extremities: no edema noted  Neurologic: A&OX4. Tremor absent.     Frailty Scores           4/23/2024 2/4/2024 2/14/2023   Frailty Scores   Final Score Not Frail Not Frail Not Frail   Final Score Number 0 0 1      Details                   Data:   CMP  Recent Labs   Lab 07/03/24  0637 07/02/24  1141 06/30/24  1728 06/30/24  1111   * 129*   < > 128*   POTASSIUM 3.6 3.7   < > 4.5   CHLORIDE 104 101   < > 97*   CO2 18* 17*   < > 21*   * 170*   < > 148*   BUN 6.7* 6.9*   < > 10.5   CR 0.63 0.63   < > 0.70   GFRESTIMATED >90 >90   < > >90   ARIEL 7.0* 7.2*   < > 8.2*   MAG 1.9 1.6*   < >  --    PHOS 1.9* 1.5*   < >  --    LIPASE  --   --   --  24   ALBUMIN 2.8* 3.0*   < > 3.1*   BILITOTAL 1.9* 2.1*   < > 1.8*   ALKPHOS 127 139   < > 140   AST 26 22   < > 25   ALT 56* 61*   < > 114*    < > = values in this interval not displayed.     CBC  Recent Labs   Lab 07/03/24  0939 07/03/24  0637   HGB 7.1* 7.7*   WBC 28.0* 29.1*    330     Attestation:    The patient has been seen with team and evaluated by me.   Vital signs, labs, medications and orders were reviewed.   When obtained, diagnostic images were reviewed by me and interpreted as above.    The care plan was discussed with the multidisciplinary team and I agree with the findings and plan in this note, with any differences recorded in blue.    Immunosuppressive medication management was reviewed and adjusted as reflected in the note and orders.       Benedicto Elizalde MD  Professor of Surgery

## 2024-07-04 LAB
ABO/RH(D): ABNORMAL
ALBUMIN SERPL BCG-MCNC: 2.8 G/DL (ref 3.5–5.2)
ALBUMIN UR-MCNC: 30 MG/DL
ALP SERPL-CCNC: 118 U/L (ref 40–150)
ALT SERPL W P-5'-P-CCNC: 46 U/L (ref 0–50)
ANION GAP SERPL CALCULATED.3IONS-SCNC: 9 MMOL/L (ref 7–15)
ANTIBODY ID: NORMAL
ANTIBODY SCREEN: POSITIVE
APPEARANCE UR: CLEAR
AST SERPL W P-5'-P-CCNC: 25 U/L (ref 0–45)
BASOPHILS # BLD AUTO: ABNORMAL 10*3/UL
BASOPHILS # BLD MANUAL: 0 10E3/UL (ref 0–0.2)
BASOPHILS NFR BLD AUTO: ABNORMAL %
BASOPHILS NFR BLD MANUAL: 0 %
BILIRUB DIRECT SERPL-MCNC: 0.72 MG/DL (ref 0–0.3)
BILIRUB SERPL-MCNC: 1.5 MG/DL
BILIRUB UR QL STRIP: NEGATIVE
BLD PROD TYP BPU: NORMAL
BLOOD COMPONENT TYPE: NORMAL
BUN SERPL-MCNC: 6.1 MG/DL (ref 8–23)
CALCIUM SERPL-MCNC: 6.9 MG/DL (ref 8.8–10.2)
CHLORIDE SERPL-SCNC: 102 MMOL/L (ref 98–107)
CODING SYSTEM: NORMAL
COLOR UR AUTO: YELLOW
CREAT SERPL-MCNC: 0.65 MG/DL (ref 0.51–0.95)
CROSSMATCH: NORMAL
DAT POLY: NORMAL
DAT, ANTI-C3: NORMAL
DAT, ANTI-IGG, TUBE: NEGATIVE
DEPRECATED HCO3 PLAS-SCNC: 21 MMOL/L (ref 22–29)
EGFRCR SERPLBLD CKD-EPI 2021: >90 ML/MIN/1.73M2
EOSINOPHIL # BLD AUTO: ABNORMAL 10*3/UL
EOSINOPHIL # BLD MANUAL: 0.7 10E3/UL (ref 0–0.7)
EOSINOPHIL NFR BLD AUTO: ABNORMAL %
EOSINOPHIL NFR BLD MANUAL: 3 %
ERYTHROCYTE [DISTWIDTH] IN BLOOD BY AUTOMATED COUNT: 19.7 % (ref 10–15)
GLUCOSE SERPL-MCNC: 114 MG/DL (ref 70–99)
GLUCOSE UR STRIP-MCNC: NEGATIVE MG/DL
HCT VFR BLD AUTO: 19.6 % (ref 35–47)
HGB BLD-MCNC: 6.2 G/DL (ref 11.7–15.7)
HGB UR QL STRIP: NEGATIVE
IMM GRANULOCYTES # BLD: ABNORMAL 10*3/UL
IMM GRANULOCYTES NFR BLD: ABNORMAL %
ISSUE DATE AND TIME: NORMAL
KETONES UR STRIP-MCNC: NEGATIVE MG/DL
LEUKOCYTE ESTERASE UR QL STRIP: ABNORMAL
LYMPHOCYTES # BLD AUTO: ABNORMAL 10*3/UL
LYMPHOCYTES # BLD MANUAL: 1.8 10E3/UL (ref 0.8–5.3)
LYMPHOCYTES NFR BLD AUTO: ABNORMAL %
LYMPHOCYTES NFR BLD MANUAL: 8 %
MAGNESIUM SERPL-MCNC: 1.6 MG/DL (ref 1.7–2.3)
MCH RBC QN AUTO: 33 PG (ref 26.5–33)
MCHC RBC AUTO-ENTMCNC: 31.6 G/DL (ref 31.5–36.5)
MCV RBC AUTO: 104 FL (ref 78–100)
MONOCYTES # BLD AUTO: ABNORMAL 10*3/UL
MONOCYTES # BLD MANUAL: 0.9 10E3/UL (ref 0–1.3)
MONOCYTES NFR BLD AUTO: ABNORMAL %
MONOCYTES NFR BLD MANUAL: 4 %
MUCOUS THREADS #/AREA URNS LPF: PRESENT /LPF
NEUTROPHILS # BLD AUTO: ABNORMAL 10*3/UL
NEUTROPHILS # BLD MANUAL: 18.6 10E3/UL (ref 1.6–8.3)
NEUTROPHILS NFR BLD AUTO: ABNORMAL %
NEUTROPHILS NFR BLD MANUAL: 85 %
NITRATE UR QL: NEGATIVE
NRBC # BLD AUTO: 0.1 10E3/UL
NRBC # BLD AUTO: 0.2 10E3/UL
NRBC BLD AUTO-RTO: 0 /100
NRBC BLD MANUAL-RTO: 1 %
PATH REPORT.COMMENTS IMP SPEC: NORMAL
PATH REPORT.COMMENTS IMP SPEC: NORMAL
PATH REPORT.FINAL DX SPEC: NORMAL
PATH REPORT.MICROSCOPIC SPEC OTHER STN: NORMAL
PATH REPORT.MICROSCOPIC SPEC OTHER STN: NORMAL
PATH REPORT.RELEVANT HX SPEC: NORMAL
PH UR STRIP: 6 [PH] (ref 5–7)
PHOSPHATE SERPL-MCNC: 2.3 MG/DL (ref 2.5–4.5)
PLAT MORPH BLD: ABNORMAL
PLATELET # BLD AUTO: 329 10E3/UL (ref 150–450)
POLYCHROMASIA BLD QL SMEAR: ABNORMAL
POTASSIUM SERPL-SCNC: 3.3 MMOL/L (ref 3.4–5.3)
PROT SERPL-MCNC: 4.7 G/DL (ref 6.4–8.3)
RBC # BLD AUTO: 1.88 10E6/UL (ref 3.8–5.2)
RBC MORPH BLD: ABNORMAL
RBC URINE: 1 /HPF
SODIUM SERPL-SCNC: 132 MMOL/L (ref 135–145)
SP GR UR STRIP: 1.02 (ref 1–1.03)
SPECIMEN EXPIRATION DATE: ABNORMAL
SPECIMEN EXPIRATION DATE: NORMAL
SQUAMOUS EPITHELIAL: 4 /HPF
UNIT ABO/RH: NORMAL
UNIT NUMBER: NORMAL
UNIT STATUS: NORMAL
UNIT TYPE ISBT: 5100
UROBILINOGEN UR STRIP-MCNC: NORMAL MG/DL
WBC # BLD AUTO: 21.9 10E3/UL (ref 4–11)
WBC URINE: 14 /HPF

## 2024-07-04 PROCEDURE — 85027 COMPLETE CBC AUTOMATED: CPT | Performed by: NURSE PRACTITIONER

## 2024-07-04 PROCEDURE — 250N000013 HC RX MED GY IP 250 OP 250 PS 637: Performed by: NURSE PRACTITIONER

## 2024-07-04 PROCEDURE — 250N000012 HC RX MED GY IP 250 OP 636 PS 637: Performed by: NURSE PRACTITIONER

## 2024-07-04 PROCEDURE — 86870 RBC ANTIBODY IDENTIFICATION: CPT | Performed by: NURSE PRACTITIONER

## 2024-07-04 PROCEDURE — 87086 URINE CULTURE/COLONY COUNT: CPT | Performed by: PHYSICIAN ASSISTANT

## 2024-07-04 PROCEDURE — 87449 NOS EACH ORGANISM AG IA: CPT | Performed by: INTERNAL MEDICINE

## 2024-07-04 PROCEDURE — 250N000011 HC RX IP 250 OP 636: Performed by: NURSE PRACTITIONER

## 2024-07-04 PROCEDURE — 86922 COMPATIBILITY TEST ANTIGLOB: CPT | Performed by: NURSE PRACTITIONER

## 2024-07-04 PROCEDURE — P9016 RBC LEUKOCYTES REDUCED: HCPCS | Performed by: NURSE PRACTITIONER

## 2024-07-04 PROCEDURE — 86880 COOMBS TEST DIRECT: CPT | Performed by: NURSE PRACTITIONER

## 2024-07-04 PROCEDURE — 84100 ASSAY OF PHOSPHORUS: CPT | Performed by: NURSE PRACTITIONER

## 2024-07-04 PROCEDURE — 82248 BILIRUBIN DIRECT: CPT | Performed by: NURSE PRACTITIONER

## 2024-07-04 PROCEDURE — 83735 ASSAY OF MAGNESIUM: CPT | Performed by: NURSE PRACTITIONER

## 2024-07-04 PROCEDURE — 250N000013 HC RX MED GY IP 250 OP 250 PS 637

## 2024-07-04 PROCEDURE — 80053 COMPREHEN METABOLIC PANEL: CPT | Performed by: NURSE PRACTITIONER

## 2024-07-04 PROCEDURE — 81001 URINALYSIS AUTO W/SCOPE: CPT | Performed by: PHYSICIAN ASSISTANT

## 2024-07-04 PROCEDURE — 85007 BL SMEAR W/DIFF WBC COUNT: CPT | Performed by: NURSE PRACTITIONER

## 2024-07-04 PROCEDURE — 86900 BLOOD TYPING SEROLOGIC ABO: CPT | Performed by: NURSE PRACTITIONER

## 2024-07-04 PROCEDURE — 120N000011 HC R&B TRANSPLANT UMMC

## 2024-07-04 PROCEDURE — 36415 COLL VENOUS BLD VENIPUNCTURE: CPT | Performed by: NURSE PRACTITIONER

## 2024-07-04 PROCEDURE — 250N000011 HC RX IP 250 OP 636: Performed by: STUDENT IN AN ORGANIZED HEALTH CARE EDUCATION/TRAINING PROGRAM

## 2024-07-04 PROCEDURE — 999N000127 HC STATISTIC PERIPHERAL IV START W US GUIDANCE

## 2024-07-04 RX ORDER — POTASSIUM CHLORIDE 750 MG/1
20 TABLET, EXTENDED RELEASE ORAL ONCE
Status: COMPLETED | OUTPATIENT
Start: 2024-07-04 | End: 2024-07-04

## 2024-07-04 RX ORDER — FUROSEMIDE 10 MG/ML
40 INJECTION INTRAMUSCULAR; INTRAVENOUS 2 TIMES DAILY
Status: COMPLETED | OUTPATIENT
Start: 2024-07-04 | End: 2024-07-04

## 2024-07-04 RX ADMIN — METHOCARBAMOL 500 MG: 500 TABLET ORAL at 08:38

## 2024-07-04 RX ADMIN — OXYCODONE HYDROCHLORIDE 5 MG: 5 TABLET ORAL at 00:42

## 2024-07-04 RX ADMIN — VALGANCICLOVIR HYDROCHLORIDE 450 MG: 450 TABLET ORAL at 08:38

## 2024-07-04 RX ADMIN — HEPARIN SODIUM 5000 UNITS: 5000 INJECTION, SOLUTION INTRAVENOUS; SUBCUTANEOUS at 13:32

## 2024-07-04 RX ADMIN — LOPERAMIDE HYDROCHLORIDE 2 MG: 2 CAPSULE ORAL at 08:39

## 2024-07-04 RX ADMIN — TACROLIMUS 3 MG: 1 CAPSULE ORAL at 08:38

## 2024-07-04 RX ADMIN — SODIUM PHOSPHATE, DIBASIC, ANHYDROUS, POTASSIUM PHOSPHATE, MONOBASIC, AND SODIUM PHOSPHATE, MONOBASIC, MONOHYDRATE 500 MG: 852; 155; 130 TABLET, COATED ORAL at 08:38

## 2024-07-04 RX ADMIN — HEPARIN SODIUM 5000 UNITS: 5000 INJECTION, SOLUTION INTRAVENOUS; SUBCUTANEOUS at 03:49

## 2024-07-04 RX ADMIN — URSODIOL 300 MG: 300 CAPSULE ORAL at 08:38

## 2024-07-04 RX ADMIN — PANTOPRAZOLE SODIUM 40 MG: 40 TABLET, DELAYED RELEASE ORAL at 08:39

## 2024-07-04 RX ADMIN — METHOCARBAMOL 500 MG: 500 TABLET ORAL at 00:42

## 2024-07-04 RX ADMIN — PIPERACILLIN AND TAZOBACTAM 4.5 G: 4; .5 INJECTION, POWDER, LYOPHILIZED, FOR SOLUTION INTRAVENOUS at 00:50

## 2024-07-04 RX ADMIN — VANCOMYCIN HYDROCHLORIDE 1000 MG: 1 INJECTION, SOLUTION INTRAVENOUS at 02:23

## 2024-07-04 RX ADMIN — TRAZODONE HYDROCHLORIDE 50 MG: 50 TABLET ORAL at 22:23

## 2024-07-04 RX ADMIN — SODIUM BICARBONATE 650 MG TABLET 650 MG: at 08:38

## 2024-07-04 RX ADMIN — POTASSIUM CHLORIDE 20 MEQ: 750 TABLET, EXTENDED RELEASE ORAL at 13:32

## 2024-07-04 RX ADMIN — ASPIRIN 81 MG 81 MG: 81 TABLET ORAL at 08:38

## 2024-07-04 RX ADMIN — MYCOPHENOLATE MOFETIL 750 MG: 250 CAPSULE ORAL at 20:31

## 2024-07-04 RX ADMIN — TACROLIMUS 3 MG: 1 CAPSULE ORAL at 18:11

## 2024-07-04 RX ADMIN — METHOCARBAMOL 500 MG: 500 TABLET ORAL at 22:23

## 2024-07-04 RX ADMIN — FUROSEMIDE 40 MG: 10 INJECTION, SOLUTION INTRAMUSCULAR; INTRAVENOUS at 10:09

## 2024-07-04 RX ADMIN — METHOCARBAMOL 500 MG: 500 TABLET ORAL at 13:41

## 2024-07-04 RX ADMIN — ACETAMINOPHEN 650 MG: 325 TABLET, FILM COATED ORAL at 13:41

## 2024-07-04 RX ADMIN — MAGNESIUM OXIDE TAB 400 MG (241.3 MG ELEMENTAL MG) 400 MG: 400 (241.3 MG) TAB at 13:32

## 2024-07-04 RX ADMIN — Medication 2 WAFER: at 08:36

## 2024-07-04 RX ADMIN — TRIAMCINOLONE ACETONIDE: 1 OINTMENT TOPICAL at 08:51

## 2024-07-04 RX ADMIN — METHOCARBAMOL 500 MG: 500 TABLET ORAL at 18:12

## 2024-07-04 RX ADMIN — PREDNISONE 5 MG: 5 TABLET ORAL at 08:38

## 2024-07-04 RX ADMIN — HEPARIN SODIUM 5000 UNITS: 5000 INJECTION, SOLUTION INTRAVENOUS; SUBCUTANEOUS at 20:31

## 2024-07-04 RX ADMIN — POTASSIUM CHLORIDE 20 MEQ: 750 TABLET, EXTENDED RELEASE ORAL at 08:37

## 2024-07-04 RX ADMIN — ACETAMINOPHEN 650 MG: 325 TABLET, FILM COATED ORAL at 08:37

## 2024-07-04 RX ADMIN — SODIUM BICARBONATE 650 MG TABLET 650 MG: at 20:31

## 2024-07-04 RX ADMIN — MYCOPHENOLATE MOFETIL 750 MG: 250 CAPSULE ORAL at 08:38

## 2024-07-04 RX ADMIN — SODIUM PHOSPHATE, DIBASIC, ANHYDROUS, POTASSIUM PHOSPHATE, MONOBASIC, AND SODIUM PHOSPHATE, MONOBASIC, MONOHYDRATE 500 MG: 852; 155; 130 TABLET, COATED ORAL at 20:31

## 2024-07-04 RX ADMIN — PIPERACILLIN AND TAZOBACTAM 4.5 G: 4; .5 INJECTION, POWDER, LYOPHILIZED, FOR SOLUTION INTRAVENOUS at 06:21

## 2024-07-04 RX ADMIN — FUROSEMIDE 40 MG: 10 INJECTION, SOLUTION INTRAMUSCULAR; INTRAVENOUS at 20:31

## 2024-07-04 RX ADMIN — OXYCODONE HYDROCHLORIDE 5 MG: 5 TABLET ORAL at 22:23

## 2024-07-04 RX ADMIN — URSODIOL 300 MG: 300 CAPSULE ORAL at 20:31

## 2024-07-04 ASSESSMENT — ACTIVITIES OF DAILY LIVING (ADL)
ADLS_ACUITY_SCORE: 34
ADLS_ACUITY_SCORE: 33
ADLS_ACUITY_SCORE: 35
ADLS_ACUITY_SCORE: 33
ADLS_ACUITY_SCORE: 33
ADLS_ACUITY_SCORE: 35
ADLS_ACUITY_SCORE: 35
ADLS_ACUITY_SCORE: 33
ADLS_ACUITY_SCORE: 35
ADLS_ACUITY_SCORE: 34
ADLS_ACUITY_SCORE: 33
ADLS_ACUITY_SCORE: 33
ADLS_ACUITY_SCORE: 35
ADLS_ACUITY_SCORE: 33
ADLS_ACUITY_SCORE: 35
ADLS_ACUITY_SCORE: 33
ADLS_ACUITY_SCORE: 35
ADLS_ACUITY_SCORE: 35
ADLS_ACUITY_SCORE: 33
ADLS_ACUITY_SCORE: 34
ADLS_ACUITY_SCORE: 33

## 2024-07-04 NOTE — PROGRESS NOTES
07/03/24 2000   Call Information   Date of Call 07/03/24   Time of Call 1929   Name of person requesting the team Tanya KEITA   Title of person requesting team RN   RRT Arrival time 1936   Time RRT ended 1945   Reason for call   Type of RRT Adult   Primary reason for call Sepsis suspected   Sepsis Suspected Elevated Lactate level;WBC <4 or >12   Was patient transferred from the ED, ICU, or PACU within last 24 hours prior to RRT call? No   SBAR   Situation LA 2.5, WBC 28   Background Per provider note: History of 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 Other history includes osteopenia, LE cellulitis, crohn's disease on infliximab, and obesity. She underwent DCD DDLT w/ biliary stent placement (on OCS x 19.4 hours) on 6/22/24 with Dr. Ball. She discharged 6/28 and now presents with fever and new patchy induration to extremities and flank.   Notable History/Conditions Hypertension;Recent surgery  (ETOH abuse and alcoholic cirrhosis with ascites, DUNN)   Assessment Sitting up in recliner chair, AAOx4, pleasant, cooperative, agreeable, denied having pain, verbally responsive, answered questions appropriately and followed commands promptly. Breathing regular, unlabored, with equal chest expansion, on room air; mostly clear to auscultation with clear but diminished bases.   Interventions Other (describe)  (repeat lactic acid level in 2 hours)   Patient Outcome   Patient Outcome Stabilized on unit   RRT Team   Attending/Primary/Covering Physician Surgery Transplant Liver   Date Attending Physician notified 07/03/24   Time Attending Physician notified 1929   Physician(s) Sariah FOOTE (PA)   Lead KP KEITA   Post RRT Intervention Assessment   Post RRT Assessment   (see next RRT entry)   Comments lactic acid recheck 4.2

## 2024-07-04 NOTE — PLAN OF CARE
"  Problem: Adult Inpatient Plan of Care  Goal: Patient-Specific Goal (Individualized)  Description: You can add care plan individualizations to a care plan. Examples of Individualization might be:  \"Parent requests to be called daily at 9am for status\", \"I have a hard time hearing out of my right ear\", or \"Do not touch me to wake me up as it startles  me\".  7/4/2024 1826 by Thad Reynolds RN  Outcome: Progressing  7/4/2024 1826 by Thad Reynolds RN  Outcome: Progressing     Problem: Adult Inpatient Plan of Care  Goal: Absence of Hospital-Acquired Illness or Injury  Intervention: Prevent Skin Injury  Recent Flowsheet Documentation  Taken 7/4/2024 1000 by Thad Reynolds RN  Body Position: position changed independently  Skin Protection:   adhesive use limited   transparent dressing maintained  Device Skin Pressure Protection:   absorbent pad utilized/changed   adhesive use limited   tubing/devices free from skin contact     Problem: Pain Acute  Goal: Optimal Pain Control and Function  7/4/2024 1826 by Thad Reynolds RN  Outcome: Progressing  7/4/2024 1826 by Thad Reynolds RN  Outcome: Progressing  Intervention: Prevent or Manage Pain  Recent Flowsheet Documentation  Taken 7/4/2024 1000 by Thad Reynolds RN  Bowel Elimination Promotion: (given PRN Imodium)   adequate fluid intake promoted   commode/bedpan at bedside   other (see comments)  Medication Review/Management: medications reviewed  Intervention: Optimize Psychosocial Wellbeing  Recent Flowsheet Documentation  Taken 7/4/2024 1000 by Thad Reynolds RN  Supportive Measures: active listening utilized   Goal Outcome Evaluation:    Abdominal/back pain 5/10, managed with PRN Robaxin and Tylenol 3 times with  relief. Eating regular diet about 75 %. Given PRN Imodium once this AM. 2 loose stools and one soft BM.   IV Laxis 40 mg this am with Urine output ~ 1900 ml    ml given for hemoglobin of 6.2  Replaced Magnesium, Potassium, and Phosphorous " PO. Next re-check tomorrow morning.

## 2024-07-04 NOTE — PROVIDER NOTIFICATION
Spoke with on call face to face.     Pt lactic recheck 2.7. Any new interventions?     Response: no new interventions at this time.

## 2024-07-04 NOTE — CODE/RAPID RESPONSE
CODE Sepsis called for pt with lactic of 2.5. RRT Provider Taz will order lactic recheck in 2 hours and if trending upwards, will then order albumin to be given. Pt and her  updated and agreeable with POC and bedside RN aware.

## 2024-07-04 NOTE — PLAN OF CARE
VS: /51 (BP Location: Right arm, Patient Position: Semi-Romero's, Cuff Size: Adult Regular)   Pulse 98   Temp 98.7  F (37.1  C) (Oral)   Resp 16   Wt 89.3 kg (196 lb 12.8 oz)   LMP 05/31/2006   SpO2 96%   BMI 32.75 kg/m      Cares: 2300 - 9278     Neuro: AOX4   VS: VSS   Cardiac: intermittently tachy low 100s, on telemetry. afebrile. Hypotensive 100/50s.   Respiratory: WDL on RA, denies SOB.   GI/: voiding adequately w/out issues, BM overnight (loose)    Skin: clamshell abdominal incision stapled KRISTIAN / band aids on abdomen from heparin shots   Diet: Regular   Labs: pending AM lab results   BG: none   LDA: Left PIV  Mobility: SBA   Pain/Nausea: abdominal pain, denies nausea   PRN medications: robaxin x1 (see MAR), oxy x1 (see MAR)   Plan of Care: continue with current POC and update MD with any changes.

## 2024-07-04 NOTE — PROGRESS NOTES
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 Other history includes osteopenia, LE cellulitis, crohn's disease on infliximab, and obesity. She underwent DCD DDLT w/ biliary stent placement (on OCS x 19.4 hours) on 6/22/24 with Dr. Ball. She discharged 6/28 and now presents with fever and new patchy induration to extremities and flank.     s/p DCD DDLT +stent 6/22/24: POD #12. LFTs continue to downtrend overall.   - Continue ASA 81 mg daily and ursodiol 300 BID.      Immunosuppressed status secondary to medications:   Maintenance:    -  mg BID   - Tacrolimus goal level 8-12.    - Prednisone 5 mg daily     Neuro:  Acute post op pain: Continue PRN Tylenol, Robaxin, oxycodone (weaning).   Insomnia: Trazodone at bedtime.       Hematology:   Acute blood loss anemia: Hgb 7.2->6.2, likely hemodilution. Transfuse 1u RBC today.  Eosinophilia: Now resolved. Peripheral smear from 7/3 pending.      Cardiorespiratory:   Pleural effusion, right: Small. Bedside thoracentesis on 7/3 with only 30ml aspirated. Sent for cultures. Transudate by Light's Criteria calculation.  Acute respiratory failure ruled out: Patient did not have respiratory failure on admission as previously charted.     GI/Nutrition:   Moderate malnutrition in the context of chronic illness: due to mild muscle loss, fluid accumulation, low albumin. Continue regular diet with protein supplements.    Endocrine: No issues.      Fluid/Electrolytes:   Hyponatremia: Na 132, stable. Pt currently hypervolemic.  Hypomagnesemia: Continue Mag-Ox 400 mg daily.  Metabolic acidosis: Continue sodium bicarb 650 mg BID.   Hypophosphatemia: Continue phos 500 mg BID.   Hypokalemia: Replaced today.  Hypocalcemia: Asymptomatic. Monitor.  Hypervolemia: Volume given overnight for  elevated lactate. Prefer to avoid further volume if possible. Lasix today per fellow.     Infectious disease:   Fevers of unknown origin: Admitted with fevers and leukocytosis. Infectious workup negative to date. Pleural effusion consistent with transudate. Transplant ID consulted.   - Stop abx today per ID (note 7/3/24), and monitor.    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 Dermatology consulted, likely SDRIFE. Induration improving.   - Start topical triamcinolone ointment 0.1% BID   - HOLD dapsone (has sulfa allergy)     Prophylaxis: DVT (mechanical, subcutaneous heparin), fall, GI (PPI), viral (Valcyte), pneumocystis (sulfa allergy, G6PD normal, HOLD dapsone)     Disposition: 7A, PT consulted    DINESH/Fellow/Resident Provider: Miguelina Weinstein NP  #[id number]     Faculty: Benedicto Elizalde M.D.    __________________________________________________________________  Transplant History:    6/22/2024 (Liver), Postoperative day: 12     Interval History: History is obtained from the patient  Overnight events: Feels very edematous, but otherwise well. Lactate elevated overnight.     ROS:   A 10-point review of systems was negative except as noted above.    Curent Meds:  Current Facility-Administered Medications   Medication Dose Route Frequency Provider Last Rate Last Admin    aspirin (ASA) chewable tablet 81 mg  81 mg Oral Daily Miguelina Weinstein APRN CNP   81 mg at 07/04/24 0838    [Held by provider] dapsone (ACZONE) tablet 50 mg  50 mg Oral or Feeding Tube Daily Philly Webb NP   50 mg at 07/02/24 0828    furosemide (LASIX) injection 40 mg  40 mg Intravenous BID Miguelina Weinstein APRN CNP   40 mg at 07/04/24 1009    heparin ANTICOAGULANT injection 5,000 Units  5,000 Units Subcutaneous Q8H Philly Webb NP   5,000 Units at 07/04/24 0349    magnesium oxide (MAG-OX) tablet 400 mg  400 mg Oral Daily Jon  Philly Becker NP   400 mg at 07/03/24 1254    mycophenolate (GENERIC EQUIVALENT) capsule 750 mg  750 mg Oral BID Miguelina Weinstein APRN CNP   750 mg at 07/04/24 0838    pantoprazole (PROTONIX) EC tablet 40 mg  40 mg Oral Daily Philly Webb NP   40 mg at 07/04/24 0839    phosphorus tablet 250 mg (PHOSPHA 250 NEUTRAL) per tablet 500 mg  500 mg Oral BID Philly Webb NP   500 mg at 07/04/24 0838    piperacillin-tazobactam (ZOSYN) 4.5 g vial to attach to  mL bag  4.5 g Intravenous Q6H Soledad Gomez MD 0 mL/hr at 06/30/24 2033 4.5 g at 07/04/24 0621    potassium chloride pedro ER (KLOR-CON M10) CR tablet 20 mEq  20 mEq Oral Once Miguelina Weinstein APRN CNP        predniSONE (DELTASONE) tablet 5 mg  5 mg Oral Daily Miguelina Weinstein APRN CNP   5 mg at 07/04/24 0838    psyllium (METAMUCIL) wafer 2 Wafer  2 Wafer Oral Daily Philly Webb NP   2 Wafer at 07/04/24 0836    sodium bicarbonate tablet 650 mg  650 mg Oral BID Philly Webb NP   650 mg at 07/04/24 0838    sodium chloride (PF) 0.9% PF flush 3 mL  3 mL Intracatheter Q8H Soledad Gomez MD   3 mL at 07/03/24 1808    tacrolimus (GENERIC EQUIVALENT) capsule 3 mg  3 mg Oral BID IS Philly Webb NP   3 mg at 07/04/24 0838    traZODone (DESYREL) half-tab 25-50 mg  25-50 mg Oral At Bedtime Fab Velazquez MD   50 mg at 07/02/24 2246    Or    traZODone (DESYREL) tablet 50 mg  50 mg Oral At Bedtime Fab Velazquez MD   50 mg at 07/03/24 2248    triamcinolone (KENALOG) 0.1 % ointment   Topical BID Philly Webb NP   Given at 07/04/24 0851    ursodiol (ACTIGALL) capsule 300 mg  300 mg Oral or Feeding Tube BID Philly Webb, NP   300 mg at 07/04/24 0838    valGANciclovir (VALCYTE) tablet 450 mg  450 mg Oral Daily Miguelina Weinstein, DARYN CNP   450 mg at 07/04/24 0838    vancomycin (VANCOCIN) 1,000 mg in 200 mL dextrose intermittent infusion  1,000 mg Intravenous Q12H Soledad Gomez  mL/hr at 07/04/24  0223 1,000 mg at 07/04/24 0223       Physical Exam:     Current Vitals:   BP 95/52 (BP Location: Right arm)   Pulse 91   Temp 97.9  F (36.6  C) (Oral)   Resp 16   Wt 89.3 kg (196 lb 12.8 oz)   LMP 05/31/2006   SpO2 99%   BMI 32.75 kg/m      Vital sign ranges:    Temp:  [97.9  F (36.6  C)-98.8  F (37.1  C)] 97.9  F (36.6  C)  Pulse:  [] 91  Resp:  [16-20] 16  BP: ()/(51-67) 95/52  SpO2:  [96 %-100 %] 99 %    General Appearance: in no apparent distress.   Skin: erythema noted bilaterally on extremities, axilla, flank, back improving. Small bulla on left forearm.  Heart: Perfused  Lungs: unlabored on RA  Abdomen: soft, non-distended. Incision C/D/I.   : no Conti present  Extremities: +1-2 generalized edema  Neurologic: A&OX4. Tremor absent.     Frailty Scores          4/23/2024 2/4/2024 2/14/2023   Frailty Scores   Final Score Not Frail Not Frail Not Frail   Final Score Number 0 0 1      Details                   Data:   CMP  Recent Labs   Lab 07/04/24  0635 07/03/24  2216 07/03/24  0637 06/30/24  1728 06/30/24  1111   * 131* 132*   < > 128*   POTASSIUM 3.3* 3.5 3.6   < > 4.5   CHLORIDE 102 101 104   < > 97*   CO2 21* 18* 18*   < > 21*   * 115* 103*   < > 148*   BUN 6.1* 6.7* 6.7*   < > 10.5   CR 0.65 0.67 0.63   < > 0.70   GFRESTIMATED >90 >90 >90   < > >90   ARIEL 6.9* 6.9* 7.0*   < > 8.2*   MAG 1.6*  --  1.9   < >  --    PHOS 2.3*  --  1.9*   < >  --    LIPASE  --   --   --   --  24   ALBUMIN 2.8* 2.9* 2.8*   < > 3.1*   BILITOTAL 1.5* 1.5* 1.9*   < > 1.8*   ALKPHOS 118 137 127   < > 140   AST 25 35 26   < > 25   ALT 46 62* 56*   < > 114*    < > = values in this interval not displayed.     CBC  Recent Labs   Lab 07/04/24  0635 07/03/24  2216   HGB 6.2* 7.2*   WBC 21.9* 30.3*    410     Attestation:    The patient has been seen with team and evaluated by me.   Vital signs, labs, medications and orders were reviewed.   When obtained, diagnostic images were reviewed by me and  interpreted as above.    The care plan was discussed with the multidisciplinary team and I agree with the findings and plan in this note, with any differences recorded in blue.    Immunosuppressive medication management was reviewed and adjusted as reflected in the note and orders.       Benedicto Elizalde MD  Professor of Surgery

## 2024-07-04 NOTE — PROGRESS NOTES
07/03/24 2200   Call Information   Date of Call 07/03/24   Time of Call 2144   Name of person requesting the team Tanya KEITA   Title of person requesting team RN   RRT Arrival time 2151   Time RRT ended 2200   Reason for call   Type of RRT Adult   Primary reason for call Sepsis suspected   Sepsis Suspected Elevated Lactate level;WBC <4 or >12   Was patient transferred from the ED, ICU, or PACU within last 24 hours prior to RRT call? No   SBAR   Situation LA 4.2, SBC 28   Background Per provider note: History of 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 Other history includes osteopenia, LE cellulitis, crohn's disease on infliximab, and obesity. She underwent DCD DDLT w/ biliary stent placement (on OCS x 19.4 hours) on 6/22/24 with Dr. Ball. She discharged 6/28 and now presents with fever and new patchy induration to extremities and flank. Also had diagnostic right thoracentesis this evening.   Notable History/Conditions Hypertension;Recent surgery  (ETOH abuse and alcoholic cirrhosis with ascites, DUNN)   Assessment In bed in low semi-Romero's position, remained AAOx4, pleasant, cooperative, agreeable, denied having pain, verbally responsive, answered questions appropriately and followed commands promptly. Breathing regular, unlabored, with equal chest expansion, on room air, and denied having any breathing issue.   Interventions Meds;Other (describe)  (lactic acid recheck after albumin infusion)   Patient Outcome   Patient Outcome Stabilized on unit   RRT Team   Attending/Primary/Covering Physician Surgery Transplant Liver   Date Attending Physician notified 07/03/24   Time Attending Physician notified 2144   Physician(s) Sariah FOOTE (PA)   Lead KP KEITA   Post RRT Intervention Assessment   Post RRT Assessment Stable/Improved   Date Follow Up Done 07/04/24   Time Follow Up Done 0000   Comments lactic acid recheck down to 2.7 after  Albumin administration

## 2024-07-04 NOTE — PROVIDER NOTIFICATION
"On-call paged:    \"[id number] NEELA Matute - Lactic acid 4.2. Rapid response called. - Susanna [phone]\"    Susanna Alaniz RN    "

## 2024-07-04 NOTE — PROGRESS NOTES
/58 (BP Location: Left arm)   Pulse 106   Temp 98.7  F (37.1  C) (Oral)   Resp 18   Wt 87.4 kg (192 lb 11.2 oz)   LMP 05/31/2006   SpO2 99%   BMI 32.07 kg/m      Shift: 0344-7004  Isolation Status: None  VS: Tachycardic otherwise VSS on RA, afebrile  Neuro: Aox4  Behaviors: Cooperative, anxious  BG: N/A  Labs: Phos 2.9, Lactics ranged from 2.5-4.2  Respiratory: WDL  Cardiac: Tachycardic  Pain/Nausea: Reports 4/10 pain, but refused prns. Denies nausea.  PRN: None given  Diet: Regular  IV Access: x1 PIV  Infusion(s): Zosyn Q6hrs, Vancomycin Q12hrs  Lines/Drains: None  GI/: Voiding, multiple loose Bms today (prn Loperamide & scheduled Metamucil ordered & given this afternoon).   Skin: Generalized rash. Clamshell incision stapled & KRISTIAN.  Mobility: SBA  Events/Education: Had two rapid responses called on pt this shift for Lactic of 2.5 and 4.2. At 2200 patient was given 25g albumin and Lactic was set to be rechecked in 2 hours.    Thoracentesis was performed bedside & chest x-ray was completed.  Plan: Continue with plan of care.

## 2024-07-05 ENCOUNTER — APPOINTMENT (OUTPATIENT)
Dept: PHYSICAL THERAPY | Facility: CLINIC | Age: 59
DRG: 607 | End: 2024-07-05
Attending: NURSE PRACTITIONER
Payer: MEDICARE

## 2024-07-05 DIAGNOSIS — Z53.9 DIAGNOSIS NOT YET DEFINED: Primary | ICD-10-CM

## 2024-07-05 PROBLEM — L85.3 XEROSIS CUTIS: Status: ACTIVE | Noted: 2024-07-05

## 2024-07-05 LAB
ALBUMIN SERPL BCG-MCNC: 2.9 G/DL (ref 3.5–5.2)
ALP SERPL-CCNC: 150 U/L (ref 40–150)
ALT SERPL W P-5'-P-CCNC: 46 U/L (ref 0–50)
ANION GAP SERPL CALCULATED.3IONS-SCNC: 10 MMOL/L (ref 7–15)
ANION GAP SERPL CALCULATED.3IONS-SCNC: 8 MMOL/L (ref 7–15)
AST SERPL W P-5'-P-CCNC: 30 U/L (ref 0–45)
BACTERIA BLD CULT: NO GROWTH
BACTERIA BLD CULT: NO GROWTH
BACTERIA UR CULT: NORMAL
BASOPHILS # BLD AUTO: ABNORMAL 10*3/UL
BASOPHILS # BLD MANUAL: 0 10E3/UL (ref 0–0.2)
BASOPHILS NFR BLD AUTO: ABNORMAL %
BASOPHILS NFR BLD MANUAL: 0 %
BILIRUB DIRECT SERPL-MCNC: 0.62 MG/DL (ref 0–0.3)
BILIRUB SERPL-MCNC: 1.3 MG/DL
BLOOD BANK CHART COMMENT: NORMAL
BUN SERPL-MCNC: 7.5 MG/DL (ref 8–23)
BUN SERPL-MCNC: 8.1 MG/DL (ref 8–23)
CALCIUM SERPL-MCNC: 7 MG/DL (ref 8.8–10.2)
CALCIUM SERPL-MCNC: 7.3 MG/DL (ref 8.8–10.2)
CHLORIDE SERPL-SCNC: 101 MMOL/L (ref 98–107)
CHLORIDE SERPL-SCNC: 102 MMOL/L (ref 98–107)
CREAT SERPL-MCNC: 0.71 MG/DL (ref 0.51–0.95)
CREAT SERPL-MCNC: 0.72 MG/DL (ref 0.51–0.95)
DEPRECATED HCO3 PLAS-SCNC: 21 MMOL/L (ref 22–29)
DEPRECATED HCO3 PLAS-SCNC: 24 MMOL/L (ref 22–29)
EGFRCR SERPLBLD CKD-EPI 2021: >90 ML/MIN/1.73M2
EGFRCR SERPLBLD CKD-EPI 2021: >90 ML/MIN/1.73M2
EOSINOPHIL # BLD AUTO: ABNORMAL 10*3/UL
EOSINOPHIL # BLD MANUAL: 0.8 10E3/UL (ref 0–0.7)
EOSINOPHIL NFR BLD AUTO: ABNORMAL %
EOSINOPHIL NFR BLD MANUAL: 5 %
ERYTHROCYTE [DISTWIDTH] IN BLOOD BY AUTOMATED COUNT: 22.2 % (ref 10–15)
GLUCOSE SERPL-MCNC: 107 MG/DL (ref 70–99)
GLUCOSE SERPL-MCNC: 132 MG/DL (ref 70–99)
HCT VFR BLD AUTO: 22.2 % (ref 35–47)
HGB BLD-MCNC: 7.5 G/DL (ref 11.7–15.7)
IMM GRANULOCYTES # BLD: ABNORMAL 10*3/UL
IMM GRANULOCYTES NFR BLD: ABNORMAL %
LYMPHOCYTES # BLD AUTO: ABNORMAL 10*3/UL
LYMPHOCYTES # BLD MANUAL: 1.8 10E3/UL (ref 0.8–5.3)
LYMPHOCYTES NFR BLD AUTO: ABNORMAL %
LYMPHOCYTES NFR BLD MANUAL: 11 %
MAGNESIUM SERPL-MCNC: 1.4 MG/DL (ref 1.7–2.3)
MCH RBC QN AUTO: 33.3 PG (ref 26.5–33)
MCHC RBC AUTO-ENTMCNC: 33.8 G/DL (ref 31.5–36.5)
MCV RBC AUTO: 99 FL (ref 78–100)
MONOCYTES # BLD AUTO: ABNORMAL 10*3/UL
MONOCYTES # BLD MANUAL: 0.6 10E3/UL (ref 0–1.3)
MONOCYTES NFR BLD AUTO: ABNORMAL %
MONOCYTES NFR BLD MANUAL: 4 %
NEUTROPHILS # BLD AUTO: ABNORMAL 10*3/UL
NEUTROPHILS # BLD MANUAL: 12.6 10E3/UL (ref 1.6–8.3)
NEUTROPHILS NFR BLD AUTO: ABNORMAL %
NEUTROPHILS NFR BLD MANUAL: 78 %
NRBC # BLD AUTO: 0.1 10E3/UL
NRBC # BLD AUTO: 0.3 10E3/UL
NRBC BLD AUTO-RTO: 0 /100
NRBC BLD MANUAL-RTO: 2 %
PHOSPHATE SERPL-MCNC: 2.5 MG/DL (ref 2.5–4.5)
PLAT MORPH BLD: ABNORMAL
PLATELET # BLD AUTO: 330 10E3/UL (ref 150–450)
POLYCHROMASIA BLD QL SMEAR: SLIGHT
POTASSIUM SERPL-SCNC: 3.2 MMOL/L (ref 3.4–5.3)
POTASSIUM SERPL-SCNC: 4.6 MMOL/L (ref 3.4–5.3)
PROMYELOCYTES # BLD MANUAL: 0.3 10E3/UL
PROMYELOCYTES NFR BLD MANUAL: 2 %
PROT SERPL-MCNC: 5.2 G/DL (ref 6.4–8.3)
RBC # BLD AUTO: 2.25 10E6/UL (ref 3.8–5.2)
RBC MORPH BLD: ABNORMAL
SODIUM SERPL-SCNC: 132 MMOL/L (ref 135–145)
SODIUM SERPL-SCNC: 134 MMOL/L (ref 135–145)
SPECIMEN EXPIRATION DATE: NORMAL
TACROLIMUS BLD-MCNC: 7.9 UG/L (ref 5–15)
TME LAST DOSE: NORMAL H
TME LAST DOSE: NORMAL H
WBC # BLD AUTO: 16.2 10E3/UL (ref 4–11)

## 2024-07-05 PROCEDURE — 250N000011 HC RX IP 250 OP 636: Performed by: NURSE PRACTITIONER

## 2024-07-05 PROCEDURE — 97161 PT EVAL LOW COMPLEX 20 MIN: CPT | Mod: GP

## 2024-07-05 PROCEDURE — 250N000013 HC RX MED GY IP 250 OP 250 PS 637: Performed by: NURSE PRACTITIONER

## 2024-07-05 PROCEDURE — 250N000011 HC RX IP 250 OP 636: Performed by: PHYSICIAN ASSISTANT

## 2024-07-05 PROCEDURE — 85007 BL SMEAR W/DIFF WBC COUNT: CPT | Performed by: NURSE PRACTITIONER

## 2024-07-05 PROCEDURE — 82310 ASSAY OF CALCIUM: CPT | Performed by: PHYSICIAN ASSISTANT

## 2024-07-05 PROCEDURE — 83735 ASSAY OF MAGNESIUM: CPT | Performed by: NURSE PRACTITIONER

## 2024-07-05 PROCEDURE — 94640 AIRWAY INHALATION TREATMENT: CPT

## 2024-07-05 PROCEDURE — 36415 COLL VENOUS BLD VENIPUNCTURE: CPT

## 2024-07-05 PROCEDURE — 250N000013 HC RX MED GY IP 250 OP 250 PS 637

## 2024-07-05 PROCEDURE — 99222 1ST HOSP IP/OBS MODERATE 55: CPT | Mod: GC | Performed by: DERMATOLOGY

## 2024-07-05 PROCEDURE — 84100 ASSAY OF PHOSPHORUS: CPT | Performed by: NURSE PRACTITIONER

## 2024-07-05 PROCEDURE — 250N000012 HC RX MED GY IP 250 OP 636 PS 637: Performed by: NURSE PRACTITIONER

## 2024-07-05 PROCEDURE — 120N000011 HC R&B TRANSPLANT UMMC

## 2024-07-05 PROCEDURE — 97530 THERAPEUTIC ACTIVITIES: CPT | Mod: GP

## 2024-07-05 PROCEDURE — 80053 COMPREHEN METABOLIC PANEL: CPT | Performed by: NURSE PRACTITIONER

## 2024-07-05 PROCEDURE — 250N000012 HC RX MED GY IP 250 OP 636 PS 637: Performed by: PHYSICIAN ASSISTANT

## 2024-07-05 PROCEDURE — 250N000013 HC RX MED GY IP 250 OP 250 PS 637: Performed by: PHYSICIAN ASSISTANT

## 2024-07-05 PROCEDURE — 94640 AIRWAY INHALATION TREATMENT: CPT | Mod: 76

## 2024-07-05 PROCEDURE — 36415 COLL VENOUS BLD VENIPUNCTURE: CPT | Performed by: PHYSICIAN ASSISTANT

## 2024-07-05 PROCEDURE — 80197 ASSAY OF TACROLIMUS: CPT

## 2024-07-05 PROCEDURE — 85027 COMPLETE CBC AUTOMATED: CPT | Performed by: NURSE PRACTITIONER

## 2024-07-05 PROCEDURE — G0463 HOSPITAL OUTPT CLINIC VISIT: HCPCS

## 2024-07-05 PROCEDURE — 999N000157 HC STATISTIC RCP TIME EA 10 MIN

## 2024-07-05 PROCEDURE — 250N000009 HC RX 250: Performed by: PHYSICIAN ASSISTANT

## 2024-07-05 PROCEDURE — G0180 MD CERTIFICATION HHA PATIENT: HCPCS | Performed by: NURSE PRACTITIONER

## 2024-07-05 PROCEDURE — 99233 SBSQ HOSP IP/OBS HIGH 50: CPT | Mod: 24 | Performed by: INTERNAL MEDICINE

## 2024-07-05 PROCEDURE — 97116 GAIT TRAINING THERAPY: CPT | Mod: GP

## 2024-07-05 RX ORDER — ALBUTEROL SULFATE 0.83 MG/ML
2.5 SOLUTION RESPIRATORY (INHALATION)
Status: DISCONTINUED | OUTPATIENT
Start: 2024-07-05 | End: 2024-07-06 | Stop reason: HOSPADM

## 2024-07-05 RX ORDER — SODIUM BICARBONATE 650 MG/1
1300 TABLET ORAL 2 TIMES DAILY
Status: DISCONTINUED | OUTPATIENT
Start: 2024-07-05 | End: 2024-07-06

## 2024-07-05 RX ORDER — MAGNESIUM OXIDE 400 MG/1
800 TABLET ORAL 2 TIMES DAILY
Status: DISCONTINUED | OUTPATIENT
Start: 2024-07-05 | End: 2024-07-06 | Stop reason: HOSPADM

## 2024-07-05 RX ORDER — EMOLLIENT BASE
CREAM (GRAM) TOPICAL EVERY 6 HOURS PRN
Status: DISCONTINUED | OUTPATIENT
Start: 2024-07-05 | End: 2024-07-06 | Stop reason: HOSPADM

## 2024-07-05 RX ORDER — FUROSEMIDE 20 MG
40 TABLET ORAL
Status: DISCONTINUED | OUTPATIENT
Start: 2024-07-05 | End: 2024-07-06 | Stop reason: ALTCHOICE

## 2024-07-05 RX ORDER — PENTAMIDINE ISETHIONATE 300 MG/300MG
300 INHALANT RESPIRATORY (INHALATION)
Status: DISCONTINUED | OUTPATIENT
Start: 2024-07-05 | End: 2024-07-05

## 2024-07-05 RX ORDER — PENTAMIDINE ISETHIONATE 300 MG/300MG
300 INHALANT RESPIRATORY (INHALATION)
Status: DISCONTINUED | OUTPATIENT
Start: 2024-07-05 | End: 2024-07-06 | Stop reason: HOSPADM

## 2024-07-05 RX ORDER — POTASSIUM CHLORIDE 1500 MG/1
60 TABLET, EXTENDED RELEASE ORAL ONCE
Status: COMPLETED | OUTPATIENT
Start: 2024-07-05 | End: 2024-07-05

## 2024-07-05 RX ORDER — MAGNESIUM SULFATE HEPTAHYDRATE 40 MG/ML
4 INJECTION, SOLUTION INTRAVENOUS ONCE
Status: DISCONTINUED | OUTPATIENT
Start: 2024-07-05 | End: 2024-07-05

## 2024-07-05 RX ADMIN — LOPERAMIDE HYDROCHLORIDE 2 MG: 2 CAPSULE ORAL at 12:52

## 2024-07-05 RX ADMIN — ALBUTEROL SULFATE 2.5 MG: 2.5 SOLUTION RESPIRATORY (INHALATION) at 12:41

## 2024-07-05 RX ADMIN — MAGNESIUM SULFATE IN WATER 4 G: 40 INJECTION, SOLUTION INTRAVENOUS at 10:46

## 2024-07-05 RX ADMIN — HEPARIN SODIUM 5000 UNITS: 5000 INJECTION, SOLUTION INTRAVENOUS; SUBCUTANEOUS at 12:52

## 2024-07-05 RX ADMIN — VALGANCICLOVIR HYDROCHLORIDE 450 MG: 450 TABLET ORAL at 08:52

## 2024-07-05 RX ADMIN — MAGNESIUM OXIDE TAB 400 MG (241.3 MG ELEMENTAL MG) 800 MG: 400 (241.3 MG) TAB at 12:52

## 2024-07-05 RX ADMIN — MYCOPHENOLATE MOFETIL 750 MG: 250 CAPSULE ORAL at 08:50

## 2024-07-05 RX ADMIN — TACROLIMUS 3.5 MG: 1 CAPSULE ORAL at 17:53

## 2024-07-05 RX ADMIN — PANTOPRAZOLE SODIUM 40 MG: 40 TABLET, DELAYED RELEASE ORAL at 08:52

## 2024-07-05 RX ADMIN — ASPIRIN 81 MG 81 MG: 81 TABLET ORAL at 08:52

## 2024-07-05 RX ADMIN — PENTAMIDINE ISETHIONATE 300 MG: 300 INHALANT RESPIRATORY (INHALATION) at 13:01

## 2024-07-05 RX ADMIN — SODIUM PHOSPHATE, DIBASIC, ANHYDROUS, POTASSIUM PHOSPHATE, MONOBASIC, AND SODIUM PHOSPHATE, MONOBASIC, MONOHYDRATE 500 MG: 852; 155; 130 TABLET, COATED ORAL at 19:50

## 2024-07-05 RX ADMIN — SODIUM BICARBONATE 1300 MG: 650 TABLET ORAL at 19:50

## 2024-07-05 RX ADMIN — MAGNESIUM OXIDE TAB 400 MG (241.3 MG ELEMENTAL MG) 800 MG: 400 (241.3 MG) TAB at 19:50

## 2024-07-05 RX ADMIN — SODIUM BICARBONATE 1300 MG: 650 TABLET ORAL at 09:49

## 2024-07-05 RX ADMIN — TRAZODONE HYDROCHLORIDE 50 MG: 50 TABLET ORAL at 21:49

## 2024-07-05 RX ADMIN — POTASSIUM CHLORIDE 60 MEQ: 1500 TABLET, EXTENDED RELEASE ORAL at 10:55

## 2024-07-05 RX ADMIN — MYCOPHENOLATE MOFETIL 750 MG: 250 CAPSULE ORAL at 19:50

## 2024-07-05 RX ADMIN — PREDNISONE 5 MG: 5 TABLET ORAL at 08:52

## 2024-07-05 RX ADMIN — Medication 2 WAFER: at 08:55

## 2024-07-05 RX ADMIN — METHOCARBAMOL 500 MG: 500 TABLET ORAL at 21:15

## 2024-07-05 RX ADMIN — METHOCARBAMOL 500 MG: 500 TABLET ORAL at 12:52

## 2024-07-05 RX ADMIN — FUROSEMIDE 40 MG: 20 TABLET ORAL at 09:49

## 2024-07-05 RX ADMIN — TACROLIMUS 3 MG: 1 CAPSULE ORAL at 08:51

## 2024-07-05 RX ADMIN — HEPARIN SODIUM 5000 UNITS: 5000 INJECTION, SOLUTION INTRAVENOUS; SUBCUTANEOUS at 03:00

## 2024-07-05 RX ADMIN — HEPARIN SODIUM 5000 UNITS: 5000 INJECTION, SOLUTION INTRAVENOUS; SUBCUTANEOUS at 19:50

## 2024-07-05 RX ADMIN — FUROSEMIDE 40 MG: 20 TABLET ORAL at 16:48

## 2024-07-05 RX ADMIN — SODIUM PHOSPHATE, DIBASIC, ANHYDROUS, POTASSIUM PHOSPHATE, MONOBASIC, AND SODIUM PHOSPHATE, MONOBASIC, MONOHYDRATE 500 MG: 852; 155; 130 TABLET, COATED ORAL at 08:52

## 2024-07-05 RX ADMIN — URSODIOL 300 MG: 300 CAPSULE ORAL at 19:50

## 2024-07-05 RX ADMIN — URSODIOL 300 MG: 300 CAPSULE ORAL at 08:52

## 2024-07-05 ASSESSMENT — ACTIVITIES OF DAILY LIVING (ADL)
ADLS_ACUITY_SCORE: 33

## 2024-07-05 NOTE — PROVIDER NOTIFICATION
"Paged on call     \"Pt triggered sepsis d/t HR of 104 and WBC of 21.9 which is down from 30 from 2 days ago. Do we need the lactic drawn for the sepsis trigger? Pt is frustrated as she knows she is improving and the levels have been on the higher side for lactic. Thanks, Soledad\"     Response: No lactic needed at this time.   "

## 2024-07-05 NOTE — CONSULTS
[facility] Inpatient Consult Dermatology Note    Impression/Plan:  # Symmetric drug-related intertriginous and flexural exanthema (SDRIFE), resolved    # Asymptomatic well demarcated pink plaques favoring the intertriginous and flexural creases, resolved    At this time feel that her symptoms represent resolving-resolved symmetric drug-related intertriginous and flexural exanthema (SDRIFE). The onset of the rash follows systemic exposure to a medication, with numerous possible culprits. The latency period could range from hours to days after exposure and can even occur after cessation of medication. The pathogenesis is unknown however it is speculated that a T cell-mediated delayed-type hypersensitivity reaction may mitigate the eruption and that increased drug excretion on the skin folds, which have a high density of eccrine glands, could explain the distribution of the rash.  The patient has a few potential culprits including antibiotics, NSAIDs, opiates, hydroxyzine while hospitalized, she also received infliximab for her Crohn's 6/12 and had recent contrast, all of which could be implicated. Fortunately SDRIFE is self limited and symptomatic relief is mainstay. Her rash is now resolved without evidence of recurrence or progression. Dermatology will sign off at this time, please call us with any new concerns or complaints.  - rash is essentially resolved with no evidence of progression on exam 07/05/24   - continue topical triamcinolone ointment 0.1 % BID PRN   - Dermatology will sign off,please page with any new questions     # Anasarca, improved   # Small edema bulla, noted 1 on exam 07/05/24   - agree with fluid status management per primary team   - please page if she develops new / progressive bulla     #Eosinophilia, mild and improving   - unlikely to be related to skin findings at this time, agree with pursuing work up of other causes     Other medical history:   #History of  cirrhosis  2/2 DUNN/EtOH complicated by HCC and ascites s/p DCD  donor liver transplant w/ biliary stent placement on 24    #Crohn's disease on infliximab therapy     Thank you for the dermatology consultation. Please do not hesitate to contact the dermatology resident/faculty on call for any additional questions or concerns.     Patient's plan and photos were reviewed with attending physician, Dr. January Colindres, DO  Dermatology Resident       Staff Physician Comments:  I sdiscussed and evaluated the patient with the resident and I agree with the assessment and plan as documented in the resident's note.    Fabio Sin MD  Professor  Head of Dermato-Allergy Division  Department of Dermatology  [facility]     Dermatology Problem List:  # Probable Symmetric drug-related intertriginous and flexural exanthema (SDRIFE)   # Asymptomatic well demarcated pink plaques favoring the intertriginous and flexural creases     Subjective:  Reports rash is essentially resolved   No worsening skin changes per patient and husbands report   She continues to have a hard time sleeping in the hospital   Swelling is improving on exam today   Reports one blister on her left forearm today in area of previous swelling   Does not report any other skin changes     Physical exam:  Vitals: /71 (BP Location: Right arm)   Pulse 89   Temp 98.5  F (36.9  C) (Oral)   Resp 16   Wt 89 kg (196 lb 1.6 oz)   LMP 2006   SpO2 97%   BMI 32.63 kg/m    GEN: This is a well developed, well-nourished female in no acute distress, in a pleasant mood.    SKIN: Total skin excluding the undergarment areas was performed. The exam included the head/face, neck, both arms, chest, back, abdomen, both legs, digits and/or nails.   -Mcmullen skin type: II  -Previous rash now resolved   -Bilateral 2+ pitting LE edema, 1 small bulla on left forearm as photographed   -No other lesions of concern on  areas examined.                   d              Laboratory:  Results for orders placed or performed during the hospital encounter of 06/30/24 (from the past 24 hour(s))   CBC with Platelets & Differential    Narrative    The following orders were created for panel order CBC with Platelets & Differential.  Procedure                               Abnormality         Status                     ---------                               -----------         ------                     CBC with platelets and d...[[id number]]  Abnormal            Final result               Manual Differential[[id number]]          Abnormal            Final result                 Please view results for these tests on the individual orders.   Tacrolimus by Tandem Mass Spectrometry   Result Value Ref Range    Tacrolimus by Tandem Mass Spectrometry 7.9 5.0 - 15.0 ug/L    Tacrolimus Last Dose Date      Tacrolimus Last Dose Time      Narrative    This test was developed and its performance characteristics determined by the [facility],  Special Chemistry Laboratory. It has not been cleared or approved by the FDA. The laboratory is regulated under CLIA as qualified to perform high-complexity testing. This test is used for clinical purposes. It should not be regarded as investigational or for research.   Basic metabolic panel   Result Value Ref Range    Sodium 132 (L) 135 - 145 mmol/L    Potassium 3.2 (L) 3.4 - 5.3 mmol/L    Chloride 101 98 - 107 mmol/L    Carbon Dioxide (CO2) 21 (L) 22 - 29 mmol/L    Anion Gap 10 7 - 15 mmol/L    Urea Nitrogen 7.5 (L) 8.0 - 23.0 mg/dL    Creatinine 0.71 0.51 - 0.95 mg/dL    GFR Estimate >90 >60 mL/min/1.73m2    Calcium 7.0 (L) 8.8 - 10.2 mg/dL    Glucose 107 (H) 70 - 99 mg/dL   Hepatic function panel   Result Value Ref Range    Protein Total 5.2 (L) 6.4 - 8.3 g/dL    Albumin 2.9 (L) 3.5 - 5.2 g/dL    Bilirubin Total 1.3 (H) <=1.2 mg/dL    Alkaline Phosphatase 150 40 - 150 U/L    AST 30 0 -  45 U/L    ALT 46 0 - 50 U/L    Bilirubin Direct 0.62 (H) 0.00 - 0.30 mg/dL   Magnesium   Result Value Ref Range    Magnesium 1.4 (L) 1.7 - 2.3 mg/dL   Phosphorus   Result Value Ref Range    Phosphorus 2.5 2.5 - 4.5 mg/dL   CBC with platelets and differential   Result Value Ref Range    WBC Count 16.2 (H) 4.0 - 11.0 10e3/uL    RBC Count 2.25 (L) 3.80 - 5.20 10e6/uL    Hemoglobin 7.5 (L) 11.7 - 15.7 g/dL    Hematocrit 22.2 (L) 35.0 - 47.0 %    MCV 99 78 - 100 fL    MCH 33.3 (H) 26.5 - 33.0 pg    MCHC 33.8 31.5 - 36.5 g/dL    RDW 22.2 (H) 10.0 - 15.0 %    Platelet Count 330 150 - 450 10e3/uL    % Neutrophils      % Lymphocytes      % Monocytes      % Eosinophils      % Basophils      % Immature Granulocytes      NRBCs per 100 WBC 0 <1 /100    Absolute Neutrophils      Absolute Lymphocytes      Absolute Monocytes      Absolute Eosinophils      Absolute Basophils      Absolute Immature Granulocytes      Absolute NRBCs 0.1 10e3/uL   Manual Differential   Result Value Ref Range    % Neutrophils 78 %    % Lymphocytes 11 %    % Monocytes 4 %    % Eosinophils 5 %    % Basophils 0 %    % Promyelocytes 2 %    NRBCs per 100 WBC 2 (H) <=0 %    Absolute Neutrophils 12.6 (H) 1.6 - 8.3 10e3/uL    Absolute Lymphocytes 1.8 0.8 - 5.3 10e3/uL    Absolute Monocytes 0.6 0.0 - 1.3 10e3/uL    Absolute Eosinophils 0.8 (H) 0.0 - 0.7 10e3/uL    Absolute Basophils 0.0 0.0 - 0.2 10e3/uL    Absolute Promyelocytes 0.3 (H) <=0.0 10e3/uL    Absolute NRBCs 0.3 (H) <=0.0 10e3/uL    RBC Morphology Confirmed RBC Indices     Platelet Assessment  Automated Count Confirmed. Platelet morphology is normal.     Automated Count Confirmed. Platelet morphology is normal.    Polychromasia Slight (A) None Seen   Basic metabolic panel   Result Value Ref Range    Sodium 134 (L) 135 - 145 mmol/L    Potassium 4.6 3.4 - 5.3 mmol/L    Chloride 102 98 - 107 mmol/L    Carbon Dioxide (CO2) 24 22 - 29 mmol/L    Anion Gap 8 7 - 15 mmol/L    Urea Nitrogen 8.1 8.0 - 23.0 mg/dL     Creatinine 0.72 0.51 - 0.95 mg/dL    GFR Estimate >90 >60 mL/min/1.73m2    Calcium 7.3 (L) 8.8 - 10.2 mg/dL    Glucose 132 (H) 70 - 99 mg/dL     *Note: Due to a large number of results and/or encounters for the requested time period, some results have not been displayed. A complete set of results can be found in Results Review.     Dr. Sin staffed the patient.    Staff Involved:  Resident/Staff

## 2024-07-05 NOTE — PLAN OF CARE
/71 (BP Location: Right arm)   Pulse 89   Temp 98.5  F (36.9  C) (Oral)   Resp 16   Wt 89 kg (196 lb 1.6 oz)   LMP 05/31/2006   SpO2 97%   BMI 32.63 kg/m      Assumed cares [id number]  Neuro: A/Ox4  Pain/Nausea: pt denies pain and nausea  Cardiac: telemetry showing normal sinus  Resp: lung sounds clear, equal bilaterally; stable on room air but some dyspnea on exertion noted  GI/: voiding spontaneously, PO Lasix x2; loose Bms, given imodium x1  Diet/Appetite: Regular diet; poor appetite; pt ate a banana, sandwich, yogurt and 2 pieces of beef jerky today  Skin: rash is improved per pt, this writer unable to visualize; wound/blister on buttock - Mepilex, see WOC note, possible pressure sore; clamshell incision w/ staples in place  Access: PIV x2 - saline locked  Activity: SBA, up frequently to bathroom, ambulated in hallway w/ PT  Plan:   Will continue with plan of care and notify team of any changes.?    ANA DELGADO RN on 7/5/2024 at 5:42 PM

## 2024-07-05 NOTE — PLAN OF CARE
VS: /65 (BP Location: Right arm)   Pulse 96   Temp 98.6  F (37  C) (Oral)   Resp 18   Wt 89 kg (196 lb 1.6 oz)   LMP 05/31/2006   SpO2 97%   BMI 32.63 kg/m      Cares: 1900 - 0730     Neuro: Aox4  VS: on telemetry - NSR. Hypotensive /50s. On RA, CAROLINA.   GI/: voiding adequately w/out issues, LBM 7/4   Skin: clamshell abdominal incision stapled KRISTIAN. Generalized rash. Peeling/blisters developing on upper extremities. Left buttock peeling - preventative mepilex in place. Old JACEK site - primapore C/D/I  Diet: Regular   Labs: awaiting AM lab results   BG: none   LDA: x2 Left PIV SL.   Mobility: SBA   Pain/Nausea: abdominal pain, denies nausea   PRN medications: robaxin x1 (see MAR), oxy x1 (see MAR)   Events/Education: n/a  Plan of Care: continue with current POC and update MD with any changes.

## 2024-07-05 NOTE — PROGRESS NOTES
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 Other history includes osteopenia, LE cellulitis, crohn's disease on infliximab, and obesity. She underwent DCD DDLT w/ biliary stent placement (on OCS x 19.4 hours) on 6/22/24 with Dr. Ball. She discharged 6/28 and now presents with fever and new patchy induration to extremities and flank.     s/p DCD DDLT +stent 6/22/24: POD #13. LFTs continue to downtrend overall.   - Continue ASA 81 mg daily and ursodiol 300 BID.      Immunosuppressed status secondary to medications:   Maintenance:    -  mg BID   - Tacrolimus goal level 8-12.    - Prednisone 5 mg daily     Neuro:  Acute post op pain: Continue PRN Tylenol, Robaxin, oxycodone (weaning).   Insomnia: Trazodone at bedtime.       Hematology:   Acute blood loss anemia: Hgb 7.5. Transfused 1u RBC on 7/4. Monitor CBC daily.  Eosinophilia: Now resolved. Peripheral smear from 7/3 pending.      Cardiorespiratory:   Pleural effusion, right: Small. Bedside thoracentesis on 7/3 with only 30ml aspirated. Sent for cultures. Transudate by Light's Criteria calculation.     GI/Nutrition:   Moderate malnutrition in the context of chronic illness: due to mild muscle loss, fluid accumulation, low albumin. Continue regular diet with protein supplements.    Endocrine: No issues.      Fluid/Electrolytes:   Hyponatremia: Na 132, stable. Pt currently hypervolemic.  Hypomagnesemia: Continue Mag-Ox 400 mg daily. Mg sulfate 4g IV x 1 today  Metabolic acidosis: Continue sodium bicarb 1300 mg BID.   Hypophosphatemia: Continue phos 500 mg BID.   Hypokalemia: Replaced today. Check BMP after replacement and lasix.  Hypocalcemia: Asymptomatic. Monitor.  Hypervolemia: Volume given overnight for elevated lactate. Prefer to avoid further volume if  possible. Lasix 40mg PO BID today.     Infectious disease:   Fevers of unknown origin: Admitted with fevers and leukocytosis. Infectious workup negative to date. Pleural effusion consistent with transudate. Transplant ID consulted.   - Abx stopped per ID (note 7/3/24), and monitor.    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 Dermatology consulted, likely SDRIFE. Induration improving.   - Start topical triamcinolone ointment 0.1% BID   - HOLD dapsone (has sulfa allergy)   - wound RN consult to evaluate and make recommendation for management blisters buttocks.     Prophylaxis: DVT (mechanical, subcutaneous heparin), fall, GI (PPI), viral (Valcyte), pneumocystis (sulfa allergy, Dapsone discontinued; Pentamidine)     Disposition: 7A, PT consulted. Possible discharge in 1-2 days.    DINESH/Fellow/Resident Provider: AMBER Bethea  #[id number]     Faculty: Benedicto Elizalde M.D.    __________________________________________________________________  Transplant History:    6/22/2024 (Liver), Postoperative day: 13     Interval History: History is obtained from the patient  Overnight events: Feels very edematous, but otherwise well.     ROS:   A 10-point review of systems was negative except as noted above.    Curent Meds:  Current Facility-Administered Medications   Medication Dose Route Frequency Provider Last Rate Last Admin    aspirin (ASA) chewable tablet 81 mg  81 mg Oral Daily Miguelina Weinstein APRN CNP   81 mg at 07/04/24 0838    [Held by provider] dapsone (ACZONE) tablet 50 mg  50 mg Oral or Feeding Tube Daily Philly Webb NP   50 mg at 07/02/24 0828    heparin ANTICOAGULANT injection 5,000 Units  5,000 Units Subcutaneous Q8H Philly Webb NP   5,000 Units at 07/05/24 0300    magnesium oxide (MAG-OX) tablet 400 mg  400 mg Oral Daily Philly Webb, NP   400 mg at 07/04/24 1332    mycophenolate  (GENERIC EQUIVALENT) capsule 750 mg  750 mg Oral BID Miguelina Weinstein APRN CNP   750 mg at 07/04/24 2031    pantoprazole (PROTONIX) EC tablet 40 mg  40 mg Oral Daily Philly Webb NP   40 mg at 07/04/24 0839    phosphorus tablet 250 mg (PHOSPHA 250 NEUTRAL) per tablet 500 mg  500 mg Oral BID Philly Webb NP   500 mg at 07/04/24 2031    predniSONE (DELTASONE) tablet 5 mg  5 mg Oral Daily Miguelina Weinstein APRN CNP   5 mg at 07/04/24 0838    psyllium (METAMUCIL) wafer 2 Wafer  2 Wafer Oral Daily Philly Webb NP   2 Wafer at 07/04/24 0836    sodium bicarbonate tablet 650 mg  650 mg Oral BID Philly Webb NP   650 mg at 07/04/24 2031    sodium chloride (PF) 0.9% PF flush 3 mL  3 mL Intracatheter Q8H Soledad Gomez MD   3 mL at 07/05/24 0257    tacrolimus (GENERIC EQUIVALENT) capsule 3 mg  3 mg Oral BID IS Philly Webb NP   3 mg at 07/04/24 1811    traZODone (DESYREL) half-tab 25-50 mg  25-50 mg Oral At Bedtime Fab Velazquez MD   50 mg at 07/02/24 2246    Or    traZODone (DESYREL) tablet 50 mg  50 mg Oral At Bedtime Fab Velazquez MD   50 mg at 07/04/24 2223    triamcinolone (KENALOG) 0.1 % ointment   Topical BID Philly Webb NP   Given at 07/04/24 0851    ursodiol (ACTIGALL) capsule 300 mg  300 mg Oral or Feeding Tube BID Philly Webb NP   300 mg at 07/04/24 2031    valGANciclovir (VALCYTE) tablet 450 mg  450 mg Oral Daily Miguelina Weinstein APRN CNP   450 mg at 07/04/24 0838       Physical Exam:     Current Vitals:   /65 (BP Location: Right arm)   Pulse 96   Temp 98.6  F (37  C) (Oral)   Resp 18   Wt 89 kg (196 lb 1.6 oz)   LMP 05/31/2006   SpO2 97%   BMI 32.63 kg/m      Vital sign ranges:    Temp:  [97.2  F (36.2  C)-98.9  F (37.2  C)] 98.6  F (37  C)  Pulse:  [] 96  Resp:  [16-18] 18  BP: ()/(43-65) 115/65  SpO2:  [95 %-100 %] 97 %    General Appearance: in no apparent distress.   Skin: erythema noted bilaterally on  extremities, axilla, flank, back improving. Small bulla on left forearm.  Heart: Perfused  Lungs: unlabored on RA  Abdomen: soft, non-distended. Incision C/D/I.   : no Conti present  Extremities: +1-2 generalized edema  Neurologic: A&OX4. Tremor absent.     Frailty Scores          4/23/2024 2/4/2024 2/14/2023   Frailty Scores   Final Score Not Frail Not Frail Not Frail   Final Score Number 0 0 1      Details                   Data:   CMP  Recent Labs   Lab 07/05/24  0608 07/04/24  0635 06/30/24  1728 06/30/24  1111   * 132*   < > 128*   POTASSIUM 3.2* 3.3*   < > 4.5   CHLORIDE 101 102   < > 97*   CO2 21* 21*   < > 21*   * 114*   < > 148*   BUN 7.5* 6.1*   < > 10.5   CR 0.71 0.65   < > 0.70   GFRESTIMATED >90 >90   < > >90   ARIEL 7.0* 6.9*   < > 8.2*   MAG 1.4* 1.6*   < >  --    PHOS 2.5 2.3*   < >  --    LIPASE  --   --   --  24   ALBUMIN 2.9* 2.8*   < > 3.1*   BILITOTAL 1.3* 1.5*   < > 1.8*   ALKPHOS 150 118   < > 140   AST 30 25   < > 25   ALT 46 46   < > 114*    < > = values in this interval not displayed.     CBC  Recent Labs   Lab 07/05/24  0608 07/04/24  0635   HGB 7.5* 6.2*   WBC 16.2* 21.9*    329     Attestation:    The patient has been seen with team and evaluated by me.   Vital signs, labs, medications and orders were reviewed.   When obtained, diagnostic images were reviewed by me and interpreted as above.    The care plan was discussed with the multidisciplinary team and I agree with the findings and plan in this note, with any differences recorded in blue.    Immunosuppressive medication management was reviewed and adjusted as reflected in the note and orders.       Benedicto Elizalde MD  Professor of Surgery

## 2024-07-05 NOTE — CONSULTS
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 Other history includes osteopenia, LE cellulitis, crohn's disease on infliximab, and obesity. She underwent DCD DDLT w/ biliary stent placement (on OCS x 19.4 hours) on 6/22/24 with Dr. Ball. She discharged 6/28 and now presents with fever and new patchy induration to extremities and flank.     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 Skin had been blistering and is now peeling, one small open area.  Wound base: 100 % dermis on open area     Palpation of the wound bed: normal      Drainage: none     Description of drainage: dried     Measurements (length x width x depth, in cm): 0.3 x 0.4 x 0 cm      Tunneling: N/A     Undermining: N/A  Periwound skin: Erythema- blanchable and peeling      Color: pink      Temperature: normal   Odor: none  Pain: no grimacing or signs of discomfort, none  Pain interventions prior to dressing change: patient tolerated well and no significant pain present   Treatment goal: Protection and Promote epidermal migration  STATUS: initial assessment  Supplies ordered: supplies stored on unit and discussed with patient         Treatment Plan:     Buttock skin/wound: Every 3 days   Cleanse the area with NS and pat dry.  Apply No sting film barrier to periwound skin.  Cover wound with Sacral Mepilex (#[id number])  Change dressing Q 3 days.  Turn and reposition Q 2hrs  side to side only.  Expect some superficial skin peeling with Mepilex as it is already peeling.  No brief in bed    Orders: Written    RECOMMEND PRIMARY TEAM ORDER: None, at this time  Education provided: plan of care, wound progress, and Moisture management  Discussed plan of care with: Patient and Family  [facility] nurse follow-up plan: signing off  Notify WOC if wound(s) deteriorate.  Nursing to notify the Provider(s) and re-consult the WO Nurse if new skin concern.    DATA:     Current support surface: Standard  Standard Isoflex gel  Containment of urine/stool: Brief and Incontinent pad in bed  BMI: Body mass index is 32.63 kg/m .   Active diet order: Orders Placed This Encounter      Regular Diet Adult     Output: I/O last 3 completed shifts:  In: 610 [P.O.:600; I.V.:10]  Out: 2900 [Urine:2700; Other:200]     Labs:   Recent Labs   Lab 07/05/24  0608 07/03/24  2216 07/03/24  1343   ALBUMIN 2.9*   < >  --    HGB 7.5*   < >  --    INR  --   --  1.10   WBC 16.2*   < >  --     < > = values in this interval not displayed.     Pressure injury risk assessment:   Sensory Perception: 3-->slightly limited  Moisture: 4-->rarely moist  Activity: 3-->walks occasionally  Mobility: 3-->slightly limited  Nutrition: 3-->adequate  Friction and Shear: 2-->potential problem  Eliseo Score: 18    Summer Amaro RN, CWOCN  Pager no longer is use, please contact through [facility]   [place] group: [facility] Nurse [place]  Dept. Office Number: [phone]

## 2024-07-05 NOTE — PROGRESS NOTES
07/05/24 1630   Appointment Info   Signing Clinician's Name / Credentials (PT) Adrian Rosado DPT   Living Environment   People in Home spouse;child(elisa), adult  (son)   Current Living Arrangements house   Home Accessibility stairs to enter home;stairs within home   Number of Stairs, Main Entrance 2   Stair Railings, Main Entrance railings safe and in good condition   Number of Stairs, Within Home, Primary seven   Stair Railings, Within Home, Primary railings on both sides of stairs   Transportation Anticipated family or friend will provide   Living Environment Comments 2 YESENIA through garage, uses door frame to hold onto. Split level with 7 upstairs with sandra rails, doesn't need to go downstairs. Lives with  and adult son   Self-Care   Usual Activity Tolerance good   Current Activity Tolerance moderate   Regular Exercise Yes   Activity/Exercise Type walking   Exercise Amount/Frequency daily   Equipment Currently Used at Home walker, rolling   Fall history within last six months no   Activity/Exercise/Self-Care Comment Pt previously IND with all ADLs and mobility at home, after d/c home from admission, was using FWW for mobility, SBA for stairs.  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 Other history includes osteopenia, LE cellulitis, crohn's disease on infliximab, and obesity. She underwent DCD DDLT w/ biliary stent placement (on OCS x 19.4 hours) on 6/22/24 with Dr. Ball. She discharged 6/28 and now presents with fever and new patchy  induration to extremities and flank.   Existing Precautions/Restrictions abdominal;fall   Weight-Bearing Status - LUE partial weight-bearing (% in comments)  (10 lb lifting restriction)   Weight-Bearing Status - RUE partial weight-bearing (% in comments)  (10 lb lifting restriction)   Weight-Bearing Status - LLE full weight-bearing   Weight-Bearing Status - RLE full weight-bearing   General Observations Activity Ambulate   Cognition   Affect/Mental Status (Cognition) WFL   Orientation Status (Cognition) oriented x 4   Follows Commands (Cognition) WFL   Pain Assessment   Patient Currently in Pain Yes, see Vital Sign flowsheet   Integumentary/Edema   Integumentary/Edema Comments pitting edema to sandra LEs, taking lasix and elevating to manage   Posture    Posture Forward head position;Protracted shoulders   Range of Motion (ROM)   Range of Motion ROM is WFL   Strength (Manual Muscle Testing)   Strength (Manual Muscle Testing) Deficits observed during functional mobility   Strength Comments able to perform partial SLR each side, limited by generalized weakness and edema   Bed Mobility   Comment, (Bed Mobility) supine > sit with SBA and HOB elevated   Transfers   Comment, (Transfers) SBA for STS with FWW   Gait/Stairs (Locomotion)   Comment, (Gait/Stairs) ambulates with FWW and SBA-Maria C, slightly increased forward flexed trunk posture with increased UE reliance on FWW   Balance   Balance Comments Needing FWW for dynamic balance due to pain, can stand unsupported   Sensory Examination   Sensory Perception Comments sensitivity to sandra feet/ankles related to swelling   Coordination   Coordination no deficits were identified   Clinical Impression   Criteria for Skilled Therapeutic Intervention Yes, treatment indicated   PT Diagnosis (PT) impaired functional mobility, at risk for deconditioning   Influenced by the following impairments weakness, fatigue, pain, deconditioning   Functional limitations due to impairments limited  community mobility, stairs, activity tolerance   Clinical Presentation (PT Evaluation Complexity) stable   Clinical Presentation Rationale clinical reasoning   Clinical Decision Making (Complexity) low complexity   Planned Therapy Interventions (PT) balance training;bed mobility training;gait training;home exercise program;motor coordination training;neuromuscular re-education;patient/family education;postural re-education;ROM (range of motion);stair training;strengthening;stretching;transfer training;progressive activity/exercise;risk factor education;home program guidelines   Risk & Benefits of therapy have been explained evaluation/treatment results reviewed;care plan/treatment goals reviewed;risks/benefits reviewed;current/potential barriers reviewed;participants voiced agreement with care plan;participants included;patient;spouse/significant other   Clinical Impression Comments Pt presents to PT after recent liver tx, was d/c home and returning with fever. Pt mobilizing well, but limited with endurance and strength, skilled PT services necessary to improve pt's level of IND for safe d/c back home.   PT Total Evaluation Time   PT Eval, Low Complexity Minutes (96777) 6   Physical Therapy Goals   PT Frequency 3x/week   PT Predicted Duration/Target Date for Goal Attainment 07/12/24   PT: Bed Mobility Independent;Supine to/from sit;Rolling;Bridging;Within precautions   PT: Transfers Modified independent;Sit to/from stand;Bed to/from chair;Assistive device   PT: Gait Modified independent;Rolling walker;Greater than 200 feet   PT: Stairs Supervision/stand-by assist;7 stairs;Rail on both sides   Therapeutic Activity   Therapeutic Activities: dynamic activities to improve functional performance Minutes (23285) 15   Symptoms Noted During/After Treatment Increased pain   Treatment Detail/Skilled Intervention Post eval, continued with functional mobility training. Discussed abdominal binder as pt notes not previously  using, assist to don, pt preferring to use with improved comfort. Discussed log roll technique for bed mobility, pt noting will stay on couch upon d/c back home. Discussed LE edema, encouraged continued elevation and adding ankle pumps to assist with fluid management. Discussed continued PT POC during IP stay with recs to return back home with HHPT.   Gait Training   Gait Training Minutes (68280) 10   Symptoms Noted During/After Treatment (Gait Training) fatigue   Treatment Detail/Skilled Intervention post eval, continued with gait training to improve pt's level of IND and tolerance. Pt ambulating 180' with FWW and SBA-Maria C, demos slightly increased forward flexed trunk posture with increased UE reliance on FWW. Cues provided on standing up tall and relaxing shoulders as able. Pt limited today due to fatigue, but notes has been able to walk two laps around nursing desk. Discussed continued daily walking program, goal of 3-4x/day to progress overall activity tolerance.   PT Discharge Planning   PT Plan flat bed mobility and STS within precautions, gait without AD as agreeable, stairs   PT Discharge Recommendation (DC Rec) home with assist;home with home care physical therapy   PT Rationale for DC Rec Pt was able to d/c home safely after recent admission, had denied any concerns when at home. Recommend pt to return back home with having  PT services to improve IND with mobility   PT Brief overview of current status Maria C with FWW   Total Session Time   Timed Code Treatment Minutes 25   Total Session Time (sum of timed and untimed services) 31

## 2024-07-05 NOTE — DISCHARGE SUMMARY
[facility]    Discharge Summary  Transplant Surgery    Date of Admission:  6/30/2024  Date of Discharge:  7/6/2024  Discharging Provider: Kim Morel PA-C  Date of Service (when I saw the patient): 07/06/24    Discharge Diagnoses   Principal Problem:    Symmetrical drug-related intertriginous and flexural exanthema  Active Problems:    Hyponatremia    Liver replaced by transplant (H)    Fever, unspecified fever cause    Pleural effusion on right    Xerosis cutis    Hypervolemia      History of Present Illness   Abiola Matute is a 59 year old female who presented with a history of 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 Other history includes osteopenia, LE cellulitis, crohn's disease on infliximab, and obesity. She underwent DCD DDLT w/ biliary stent placement (on OCS x 19.4 hours) on 6/22/24 with Dr. Ball. She discharged 6/28 and now presents with fever and new patchy induration to extremities and flank.     Hospital Course   Abiola Matute was admitted on 6/30/2024.  The following problems were addressed during her hospitalization:    s/p DCD DDLT +stent 6/22/24: POD #13. LFTs continue to downtrend overall.   - Continue ASA 81 mg daily and ursodiol 300 BID.      Immunosuppressed status secondary to medications:   Maintenance:    -  mg BID   - Tacrolimus goal level 8-12.    - Prednisone 5 mg daily     Neuro:  Acute post op pain: Continue PRN Tylenol, Robaxin, oxycodone (weaning).   Insomnia: Trazodone at bedtime.       Hematology:   Acute blood loss anemia: Transfused 1u RBC on 7/4. Hemoglobin 7.4 on day of discharge.  Eosinophilia: Improved   -7/3 peripheral smear: slight eosinophilia comment: absolute eosinophilia is present. Some possible etiologies include allergic conditions, hypersensitivity reactions, medications, parasitic infestations, certain gastrointestinal and skin disorders,  and neoplastic processes.      Cardiorespiratory:   Pleural effusion, right: Small. Bedside thoracentesis on 7/3 with only 30ml aspirated. Sent for cultures. Transudate by Light's Criteria calculation.     GI/Nutrition:   Moderate malnutrition in the context of chronic illness: due to mild muscle loss, fluid accumulation, low albumin. Continue regular diet with protein supplements.     Endocrine: No issues.      Fluid/Electrolytes:   Hyponatremia, hypervolemia: Resolved  Hypomagnesemia: Continue Mag-Ox 400 mg daily  Metabolic acidosis: Continue sodium bicarb 1300 mg BID  Hypophosphatemia: Continue phos 500 mg BID.   Hypokalemia: Resolved  Hypocalcemia: Asymptomatic. Monitor.  Hypervolemia: Lasix 80mg PO BID for BLE edema. Hold if weight < or equal to 187lb (85 kg). Follow up in clinic on 7/8.      Infectious disease:   Fevers of unknown origin: Admitted with fevers and leukocytosis. Infectious workup negative to date. Pleural effusion consistent with transudate. Transplant ID consulted.   - Antibiotic stopped per ID (note 7/3/24), and monitor.      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 Dermatology consulted, likely SDRIFE. Induration improving.   - Start topical triamcinolone ointment 0.1% BID   - HOLD dapsone (has sulfa allergy)   - Wound RN consult to evaluate and make recommendation for management buttock wound. See discharge orders      Discharge Disposition   Discharged to home   Condition at discharge: Stable    Pending Results   These results will be followed up by Zayra Paulino    Unresulted Labs Ordered in the Past 30 Days of this Admission       Date and Time Order Name Status Description    7/3/2024 11:30 PM Coccidioides Agn Quant EIA Blood In process     7/3/2024  9:48 PM Blood Culture Wrist, Right Preliminary     7/3/2024  9:48 PM Blood Culture Hand, Left Preliminary     7/3/2024  1:43 PM  Coccidioides antibody In process     7/3/2024 12:36 PM Fungus Culture, non-blood Preliminary     7/3/2024 12:36 PM Anaerobic bacterial culture Preliminary     7/3/2024 12:36 PM Pleural fluid Aerobic Bacterial Culture Routine With Gram Stain Preliminary     7/1/2024  7:55 PM Blood Culture Arm, Right Preliminary     7/1/2024  7:55 PM Blood Culture Arm, Left Preliminary     6/23/2024  6:17 AM Prepare plasma (unit) Preliminary     6/22/2024 10:05 AM Prepare pheresed platelets (unit) Preliminary     6/22/2024  8:00 AM Fungal or Yeast Culture Routine Preliminary             Primary Care Physician   Linda Macdonald    /75 (BP Location: Right arm)   Pulse 93   Temp 98.8  F (37.1  C) (Oral)   Resp 16   Wt 90.1 kg (198 lb 11.2 oz)   LMP 05/31/2006   SpO2 99%   BMI 33.07 kg/m    General Appearance: in no apparent distress.   Skin: erythema noted bilaterally on extremities, axilla, flank, back improving. Small bulla on left forearm.  Heart: Perfused  Lungs: unlabored on RA  Abdomen: soft, non-distended. Incision C/D/I.   : no Conti present  Extremities: +1-2 generalized edema  Neurologic: A&OX4. Tremor absent.       Consultations This Hospital Stay   NURSING TO CONSULT FOR VASCULAR ACCESS CARE IP CONSULT  PHARMACY TO DOSE VANCO  NURSING TO CONSULT FOR VASCULAR ACCESS CARE IP CONSULT  DERMATOLOGY IP CONSULT  CARE MANAGEMENT / SOCIAL WORK IP CONSULT  INFECTIOUS DISEASE TRANSPLANT SOT ADULT IP CONSULT  PHYSICAL THERAPY ADULT IP CONSULT  NURSING TO CONSULT FOR VASCULAR ACCESS CARE IP CONSULT  INTERNAL MEDICINE PROCEDURE TEAM ADULT IP CONSULT [place] - THORACENTESIS  NURSING TO CONSULT FOR VASCULAR ACCESS CARE IP CONSULT  WOUND OSTOMY CONTINENCE NURSE  IP CONSULT    Time Spent on this Encounter   I have spent greater than 30 minutes on this discharge.    Discharge Orders   Discharge Medications   Current Discharge Medication List        START taking these medications    Details   albuterol (PROVENTIL) (2.5 MG/3ML)  0.083% neb solution Take 1 vial (2.5 mg) by nebulization every 28 days  Qty: 90 mL, Refills: 5    Associated Diagnoses: S/P liver transplant (H)      emollient (VANICREAM) external cream Apply topically every 6 hours as needed for other (Dryness)  Qty: 453 g, Refills: 1    Associated Diagnoses: Xerosis cutis      furosemide (LASIX) 40 MG tablet Take 2 tablets (80 mg) by mouth 2 times daily as needed (edema. Hold if weight < or equal to 187lb (85 kg))  Qty: 30 tablet, Refills: 1    Associated Diagnoses: S/P liver transplant (H)      pentamidine (NEBUPENT) 300 MG neb solution Inhale 300 mg into the lungs every 28 days    Associated Diagnoses: S/P liver transplant (H)      psyllium (METAMUCIL) WAFR Take 2 Wafers by mouth daily  Qty: 60 Wafer, Refills: 2    Associated Diagnoses: S/P liver transplant (H)      triamcinolone (KENALOG) 0.1 % external ointment Apply topically 2 times daily  Qty: 30 g, Refills: 3    Associated Diagnoses: S/P liver transplant (H)           CONTINUE these medications which have CHANGED    Details   magnesium oxide (MAG-OX) 400 MG tablet Take 1 tablet (400 mg) by mouth 2 times daily  Qty: 60 tablet, Refills: 2    Associated Diagnoses: Hypomagnesemia      !! tacrolimus (GENERIC EQUIVALENT) 0.5 MG capsule Take 1 capsule (0.5 mg) by mouth 2 times daily Total discharge dose = 3.5 mg BID  Qty: 60 capsule, Refills: 11    Associated Diagnoses: S/P liver transplant (H)      !! tacrolimus (GENERIC EQUIVALENT) 1 MG capsule Take 3 capsules (3 mg) by mouth 2 times daily Total discharge dose = 3.5 mg BID  Qty: 180 capsule, Refills: 11    Associated Diagnoses: S/P liver transplant (H)       !! - Potential duplicate medications found. Please discuss with provider.        CONTINUE these medications which have NOT CHANGED    Details   acetaminophen (TYLENOL) 325 MG tablet Take 2 tablets (650 mg) by mouth every 6 hours as needed for mild pain  Qty: 60 tablet, Refills: 0    Associated Diagnoses: Acute  post-operative pain      aspirin (ASA) 81 MG chewable tablet 1 tablet (81 mg) by Oral or Feeding Tube route daily  Qty: 30 tablet, Refills: 5    Associated Diagnoses: S/P liver transplant (H)      mycophenolate (GENERIC EQUIVALENT) 250 MG capsule Take 3 capsules (750 mg) by mouth 2 times daily  Qty: 180 capsule, Refills: 11    Associated Diagnoses: S/P liver transplant (H)      pantoprazole (PROTONIX) 40 MG EC tablet Take 1 tablet (40 mg) by mouth daily  Qty: 30 tablet, Refills: 2    Associated Diagnoses: S/P liver transplant (H)      polyethylene glycol (MIRALAX) 17 GM/Dose powder Take 17 g by mouth 2 times daily as needed for constipation  Qty: 510 g, Refills: 0    Associated Diagnoses: S/P liver transplant (H)      predniSONE (DELTASONE) 5 MG tablet 1 tablet (5 mg) by Oral or Feeding Tube route daily  Qty: 30 tablet, Refills: 2    Associated Diagnoses: S/P liver transplant (H)      traZODone (DESYREL) 50 MG tablet Take 0.5-1 tablets (25-50 mg) by mouth nightly as needed for sleep  Qty: 30 tablet, Refills: 0    Associated Diagnoses: Insomnia, unspecified type      ursodiol (ACTIGALL) 300 MG capsule 1 capsule (300 mg) by Oral or Feeding Tube route 2 times daily  Qty: 60 capsule, Refills: 2    Associated Diagnoses: S/P liver transplant (H)      valGANciclovir (VALCYTE) 450 MG tablet Take 1 tablet (450 mg) by mouth daily  Qty: 180 tablet, Refills: 0    Associated Diagnoses: S/P liver transplant (H)           STOP taking these medications       dapsone (ACZONE) 25 MG tablet Comments:   Reason for Stopping:         methocarbamol (ROBAXIN) 500 MG tablet Comments:   Reason for Stopping:         oxyCODONE (ROXICODONE) 5 MG tablet Comments:   Reason for Stopping:         senna-docusate (SENOKOT-S/PERICOLACE) 8.6-50 MG tablet Comments:   Reason for Stopping:                  Home Care Referral      Reason for your hospital stay    Symmetric drug-related intertriginous and flexural exanthema (SDRIFE) possible secondary to  Dapsone.     Activity    Your activity upon discharge: Walk at least four times a day, lift no greater than 10 pounds for 8 weeks from the time of surgery.  No driving while taking narcotics or 3 weeks after surgery.     [facility] Follow-up and recommended labs and tests    [facility] FOLLOW UP:     1. Follow up in Transplant Clinic weekly for the next 4-5 weeks with Dr. Ball (or any available liver transplant surgeon). Next appointment is on 7/8/24.   2. Follow up with Transplant Hepatology in 3 months.   3. Abdominal x-ray 3 months post-transplant to evaluate for biliary stent.  4. HCC. Follow up with Oncology in 3 months.   5. Follow up with primary care provider in 4 weeks or sooner if needed.   6. Pentamidine INH treatment (PJP pneumonia prophylaxis) every 28 days. Next treatment due on 8/1/24. Please schedule patient in ATC.     Call your Transplant Coordinator ([phone]) with questions about Transplant Center appointment scheduling.     LABS:   CBC, BMP, magnesium, phosphorus, hepatic panel, tacrolimus level every Monday and Thursday by home health care nurse if arranged, or at an outpatient lab.     When to contact your care team    WHEN TO CONTACT YOUR  COORDINATOR:     Transplant Coordinator: Zayra Paulino, phone: [phone]     Notify your coordinator if you have pain over your liver, increased redness or drainage from your incision, fever greater than 100F, or yellowing of skin or eyes.     Worsening of rash, blistering.     Notify your coordinator immediately if you are ever unable to take your immunosuppressive medications for any reason.     If you have URGENT concerns after office hours, please call the hospital switchboard at [phone] and ask to have the organ transplant nurse on-call paged. If you have a life-threatening emergency, go to the nearest emergency room.     Wound care and dressings    Instructions to care for your wound at home: If you have staples  in place, they will be removed in 3 weeks after operation. Wash incision daily with soap and water. Do not soak or scrub.   .     Monitor and record    Weight every day. Stop lasix if weight < or equal to 187lb (85 kg)     Wound care and dressings    Instructions to care for your wound at home: Buttocks wound  Cleanse the area with NS and pat dry.  Apply No sting film barrier to periwound skin.  Cover wound with Sacral Mepilex (#406015)  Change dressing Q 3 days.  Turn and reposition Q 2hrs side to side only.  Expect some superficial skin peeling with Mepilex as it is already peeling.  No brief in bed     Diet    Follow this diet upon discharge: Diet recommendations post-transplant: Heart healthy dietary habits long term (low saturated/trans fat, low sodium). High protein diet x 8 weeks. Practice food safety precautions.         Data   7/3/24  Final Diagnosis   Peripheral Blood Smear:  -Marked normochromic, macrocytic anemia; increased erythrocyte regeneration (see comment)  -Moderate leukocytosis; toxic neutrophilia; slight eosinophilia and left shifted maturation  - No overt evidence of dysplasia       Most Recent 3 CBC's:  Recent Labs   Lab Test 07/06/24  0724 07/05/24  0608 07/04/24  0635   WBC 13.5* 16.2* 21.9*   HGB 7.4* 7.5* 6.2*    99 104*    330 329      Most Recent 3 BMP's:  Recent Labs   Lab Test 07/06/24  0647 07/05/24  1503 07/05/24  0608    134* 132*   POTASSIUM 3.7 4.6 3.2*   CHLORIDE 103 102 101   CO2 24 24 21*   BUN 8.8 8.1 7.5*   CR 0.70 0.72 0.71   ANIONGAP 9 8 10   ARIEL 7.2* 7.3* 7.0*   * 132* 107*     Most Recent 2 LFT's:  Recent Labs   Lab Test 07/06/24  0647 07/05/24  0608   AST 22 30   ALT 39 46   ALKPHOS 161* 150   BILITOTAL 0.9 1.3*     Most Recent INR's and Anticoagulation Dosing History:  Anticoagulation Dose History  More data exists         Latest Ref Rng & Units 6/20/2024 6/22/2024 6/23/2024 6/24/2024 6/25/2024 6/26/2024 7/3/2024   Recent Dosing and Labs   INR  0.85 - 1.15 1.24  1.84  2.17  3.43  2.04  2.24  1.74  1.45  1.23  1.10       Details          Multiple values from one day are sorted in reverse-chronological order             Most Recent 3 Troponin's:  Recent Labs   Lab Test 10/21/19  0211   TROPI <0.015     Most Recent Cholesterol Panel:  Recent Labs   Lab Test 02/14/23  0824   CHOL 149   LDL 80   HDL 53   TRIG 78     Most Recent 6 Bacteria Isolates From Any Culture (See EPIC Reports for Culture Details):  Recent Labs   Lab Test 10/09/19  0950 03/28/19  1021 03/28/19  1009 02/26/18  1607 11/02/16  1044 08/03/16  1040   CULT No growth Moderate growth  Streptococcus agalactiae sero group B  *  Light growth  Escherichia coli  *  Moderate growth  Streptococcus anginosus  *  Light growth  Strain 2  Escherichia coli  * Heavy growth  Mixed fecal sabrina    Unable to determine the presence of Actinomyces species due to an overgrowth of normal   sabrina.    Moderate growth  Bacteroides fragilis  Susceptibility testing not routinely done  *  Moderate growth  Mixed aerobic and anaerobic sabrina  *  Moderate growth  Streptococcus agalactiae sero group B  Susceptibility testing done on previous specimen  *  Light growth  Citrobacter freundii complex  *  Light growth  Escherichia coli  Susceptibility testing done on previous specimen  *  Moderate growth  Enterococcus faecalis  *  Light growth  Streptococcus anginosus  Susceptibility testing done on previous specimen  * >100,000 colonies/mL  Escherichia coli  * 10,000 to 50,000 colonies/mL mixed urogenital sabrina Susceptibility testing not   routinely done   Moderate growth Serratia marcescens  Moderate growth Coagulase negative Staphylococcus Susceptibility testing not   routinely done  *     Most Recent TSH, T4 and A1c Labs:  Recent Labs   Lab Test 06/22/24  1613 02/14/23  0824   TSH  --  2.63   A1C 5.1 4.7       Lab Results   Component Value Date    LIPASE 24 06/30/2024    LIPASE 115 (H) 06/23/2024    LIPASE 205  04/25/2020    LIPASE 216 04/24/2020    LIPASE 174 04/21/2020     Lab Results   Component Value Date    AMYLASE 114 (H) 06/23/2024    AMYLASE 124 (H) 06/20/2024    AMYLASE 79 04/24/2020      Attestation:    The patient has been seen with team and evaluated by me.   Vital signs, labs, medications and orders were reviewed.   When obtained, diagnostic images were reviewed by me and interpreted as above.    The care plan was discussed with the multidisciplinary team and I agree with the findings and plan in this note, with any differences recorded in blue.    Immunosuppressive medication management was reviewed and adjusted as reflected in the note and orders.       Benedicto Elizalde MD  Professor of Surgery\

## 2024-07-05 NOTE — PROGRESS NOTES
Transplant Infectious Diseases Inpatient Consultation      Abiola Matute MRN# [id number]   YOB: 1965 Age: 59 year old   Date of Admission and time: 6/30/2024 10:43 AM     Reason for consult: Fever, leukocytosis         Recommendations:   Continue to monitor off of antibiotics    Awaiting results of toxoplasma Ab, coccidioides Ab/ag.    Consider hematology consult if she has ongoing eosinophilia   Continue VGCV for CMV PPx  PJP PPx with pentamidine given today (7/5/24)    Transplant infectious diseases will continue to follow with you. Dr. Wood will be covering the service over the weekend (7/6-7/7) and will not plan on seeing the patient unless asked. I will resume the service on Monday (7/8) and will see Virginia if she remains in the hospital.     Attestation:  Total duration of visit including chart review, reviewing labs and imaging, interviewing and examining the patient, documentation, and sending communication to the primary treating team, all at the same day of this encounter, is: 56 minutes.       Lisa Gilbert MD  Transplant Infectious Diseases Attending  [phone]          Synopsis of Clinical Presentation and Course   Transplant Summary  Transplants:  6/22/2024 (Liver); POD  13.  Coordinator Zayra Paulino  Reason for Transplantation: DUNN/EtOH  Current Immunosuppression: Prednisone 5 mg/day, tacrolimus,  mg BID.     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 She was discharged on 6/28/24 and re-admitted on 6/30/24 with fevers to 101 and patchy induratio non RUE/axilla/flank. We are being consulted for assistance with infectious diseases work-up.     Patient noted she was at home after discharge. She developed fevers to >101 F with induration on RUE/axilla/flank that appeared the day before admission. Work-up remarkable for WBC 26.0 with lactate of  3.3. Started on vanc and pip/tazo. Blood cultures from 6/30 and 7/1 remain NGTD. CT C/A/P (6/30/21) with small to moderate R-sided pleural effusion with R-sided groundglass density, small volume ascites, and 6.8 x 2.4 cm collection in medial to R hemiliver consistent with resorbing hematoma.     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 Work-up thus far negative for infectious etiology including blood cultures x2 (6/30, 7/1), urine culture (7/1), C diff negative (6/30). CT A/P from 6/30/14 with a R pleural effusion, a 5.4 x1.5 cm seroma vs hematoma in R flank, 6.8 x 2.4 cm resolving hematoma around the transplanted liver, and small volume ascites in the pelvis. R-sided thoracentesis (7/3/24) consistent with a transudate with 362 WBC, which were 48% lymphocytes. Did discuss with transplant surgery, who are not concerned for an infection in her perihepatic fluid collection. She doesn't have any significant respiratory symptoms (cough, sputum production), so this is not consistent with a pneumonia or a respiratory viral infection. She does have a rash and eosinophilia, but per dermatology this is not consistent with a DRESS syndrome. Tbili is stable at 1.8, so less likely related to biliary source. Fortunately, her fever has not returned since hospital admission. CMV VL is negative. At this point, plan to watchfully wait off of antibiotics (vanc and pip/tazo discontinued on 7/4/24).     Eosinophilia   Remains unclear etiology at this time. 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  Primary team holding dapsone, since this was a potential culprit. They do not think her presentation is consistent with a DRESS syndrome. She has not traveled to any strongyloides-endemic region or worked in an industry in which she was exposed to ascariasis. Coccidioidomycosis can cause eosinophilia. She spent 1 week in [place] about 4 years ago, so have sent Antigen and Antibodies. She eats venison that her  dresses, but always cooks thoroughly. Toxoplasmosis can occasionally cause eosinophilia and has been associated with venison, so will send toxoplasma Ab. Eosinophilia could be related to drug reaction (pantoprazole and dapsone have both been associated; pip/tazo and vancomycin are also associated with this, but they were started at the time her eosinophilia developed). Peripheral smear unremarkable except for eosinophilia. Would consider heme consult if this does not continue to improve.     Transplant Checklist  - QTc interval: ?  - Pneumocystis prophylaxis: Initially on dapsone, but concern that this triggered SDRIFE syndrome so started on pentamidine 7/5/24.   - Bacterial prophylaxis: None  - Fungal prophylaxis: None  - Serostatus & viral prophylaxis: CMV D-/R+, HSV ?, EBV D+/R+ Valgancyclovir  - Immunization status:  Readdress at 3 months post-transplant  - Gamma globulin status: No lab results found.  - Antibiotic allergies: Hives with sulfa and pain/itching with metronidazole. Should have allergy consult in coming months to assess for ongoing sulfa allergy.          Old Problems and Infectious Diseases Issues:   N/A          Interval Events    Rash continues to improve  Now with desquamation of palms after being given Lasix  Very tired today, but no other new complaints or concerns     Abx  Branden/tazo: 6/30-7/4  Vancomycin: 6/30-7/4              Immunizations:     Immunization History   Administered Date(s) Administered    COVID-19 MONOVALENT 12+ (Pfizer) 04/05/2021, 04/21/2021, 12/22/2021     Influenza (IIV3) PF 11/01/2014    Influenza Vaccine 18-64 (Flublok) 10/03/2019, 09/15/2021, 11/11/2022    Influenza Vaccine >6 months,quad, PF 11/02/2016, 09/04/2020, 02/07/2024    Influenza Vaccine, 6+MO IM (QUADRIVALENT W/PRESERVATIVES) 09/23/2015, 10/05/2017    MMR 04/24/2007    Pneumococcal 20 valent Conjugate (Prevnar 20) 11/11/2022    Pneumococcal 23 valent 09/04/2020    TD,PF 7+ (Tenivac) 09/16/1999    TDAP Vaccine (Adacel) 09/04/2020    TDAP Vaccine (Boostrix) 04/07/2010    Zoster recombinant adjuvanted (SHINGRIX) 09/08/2020, 11/04/2020            Allergies:     Allergies   Allergen Reactions    Blood Transfusion Related (Informational Only) Other (See Comments)     Patient has a history of a clinically significant antibody against RBC antigens.  Anti Jka identified at [facility] on 7/4/2024. A delay in compatible RBCs may occur.    Fish Oil      Redness and itching around eye area only - went away when fish oil capsules stopped     Metronidazole      pain/itching    Ppd [Tuberculin Purified Protein Derivative]     Sulfa Antibiotics      hives    Terbinafine And Related      Lamisil = mild urticarial reaction             Medications:   Medications that Require Transfusion:   Current Facility-Administered Medications   Medication Dose Route Frequency Provider Last Rate Last Admin     Scheduled Medications:   Current Facility-Administered Medications   Medication Dose Route Frequency Provider Last Rate Last Admin    albuterol (PROVENTIL) neb solution 2.5 mg  2.5 mg Nebulization Q28 Days Kim Morel PA-C        And    pentamidine (NEBUPENT) neb solution 300 mg  300 mg Inhalation Q28 Days Kim Morel PA-C        aspirin (ASA) chewable tablet 81 mg  81 mg Oral Daily Miguelina Weinstein APRN CNP   81 mg at 07/05/24 0852    [Held by provider] dapsone (ACZONE) tablet 50 mg  50 mg Oral or Feeding Tube Daily Philly Webb, NP   50 mg at 07/02/24 0828    furosemide (LASIX) tablet 40 mg  40 mg  Oral BID Kim Morel PA-C   40 mg at 07/05/24 0949    heparin ANTICOAGULANT injection 5,000 Units  5,000 Units Subcutaneous Q8H Philly Webb NP   5,000 Units at 07/05/24 0300    magnesium oxide (MAG-OX) tablet 800 mg  800 mg Oral BID Kim Morel PA-C        mycophenolate (GENERIC EQUIVALENT) capsule 750 mg  750 mg Oral BID Miguelina Weinstein APRN CNP   750 mg at 07/05/24 0850    pantoprazole (PROTONIX) EC tablet 40 mg  40 mg Oral Daily Philly Webb NP   40 mg at 07/05/24 0852    phosphorus tablet 250 mg (PHOSPHA 250 NEUTRAL) per tablet 500 mg  500 mg Oral BID Philly Webb NP   500 mg at 07/05/24 0852    predniSONE (DELTASONE) tablet 5 mg  5 mg Oral Daily Miguelina Weinstein APRN CNP   5 mg at 07/05/24 0852    psyllium (METAMUCIL) wafer 2 Wafer  2 Wafer Oral Daily Philly Webb NP   2 Wafer at 07/05/24 0855    sodium bicarbonate tablet 1,300 mg  1,300 mg Oral BID Kim Morel PA-C   1,300 mg at 07/05/24 0949    sodium chloride (PF) 0.9% PF flush 3 mL  3 mL Intracatheter Q8H Soledad Gomez MD   3 mL at 07/05/24 1053    tacrolimus (GENERIC EQUIVALENT) capsule 3 mg  3 mg Oral BID IS Philly Webb NP   3 mg at 07/05/24 0851    traZODone (DESYREL) half-tab 25-50 mg  25-50 mg Oral At Bedtime Fab Velazquez MD   50 mg at 07/02/24 2246    Or    traZODone (DESYREL) tablet 50 mg  50 mg Oral At Bedtime Fab Velazquez MD   50 mg at 07/04/24 2223    triamcinolone (KENALOG) 0.1 % ointment   Topical BID Philly Webb NP   Given at 07/04/24 0851    ursodiol (ACTIGALL) capsule 300 mg  300 mg Oral or Feeding Tube BID Philly Webb NP   300 mg at 07/05/24 0852    valGANciclovir (VALCYTE) tablet 450 mg  450 mg Oral Daily Miguelina Weinstein APRN CNP   450 mg at 07/05/24 0852          Physical Exam     Physical Exam     Vital signs:  /66 (BP Location: Right arm)   Pulse 95   Temp 99.2  F (37.3  C) (Oral)   Resp 18   Wt 89 kg (196 lb 1.6 oz)    LMP 05/31/2006   SpO2 97%   BMI 32.63 kg/m      GENERAL: alert and no distress  NECK: no adenopathy, no asymmetry, masses, or scars  RESP: lungs clear to auscultation - no rales, rhonchi or wheezes  CV: regular rate and rhythm, normal S1 S2, no S3 or S4, no murmur, click or rub, no peripheral edema. Surgical incision site is well-healing and without drainage or erythema.   ABDOMEN: soft, nontender, no hepatosplenomegaly, no masses and bowel sounds normal  MS: no gross musculoskeletal defects noted, no edema  SKIN: Erythematous rash on flexor surfaces of arms, intertriginous areas has significantly improved today.     Data     Data   Laboratory data and imaging listed below was reviewed prior to this encounter.     Microbiology:    Culture   Date Value Ref Range Status   07/04/2024 <10,000 CFU/mL Urogenital sabrina  Final   07/03/2024 No growth after 1 day  Preliminary   07/03/2024 No growth after 1 day  Preliminary   07/03/2024 No growth after 1 day  Preliminary   07/03/2024 No anaerobic organisms isolated after 1 day  Preliminary   07/03/2024 No growth after 1 day  Preliminary   07/01/2024 No Growth  Final   07/01/2024 No growth after 3 days  Preliminary   07/01/2024 No growth after 3 days  Preliminary   06/30/2024 No growth after 4 days  Preliminary   06/30/2024 No growth after 4 days  Preliminary   06/22/2024 No anaerobic organisms isolated  Final   06/22/2024 No growth after 13 days  Preliminary   06/22/2024 No Growth  Final   06/20/2024 <10,000 CFU/mL Mixture of Urogenital Sabrina  Final   06/20/2024   Final    No Vancomycin Resistant Enterococcus species isolated     GS Culture   Date Value Ref Range Status   06/22/2024 See corresponding culture for results  Final   , CD4: No lab results found. and HIV RNA: No lab results found., Hepatitis B Testing:   Recent Labs   Lab Test 06/20/24  1651 02/14/23  0824   HBCAB Nonreactive Nonreactive   HEPBANG Nonreactive Nonreactive   , Hepatitis C Testing:   Hepatitis C  Antibody   Date Value Ref Range Status   06/20/2024 Nonreactive Nonreactive Final     Comment:     A nonreactive screening test result does not exclude the possibility of exposure to or infection with HCV. Nonreactive screening test results in individuals with prior exposure to HCV may be due to antibody levels below the limit of detection of this assay or lack of reactivity to the HCV antigens used in this assay. Patients with recent HCV infections (<3 months from time of exposure) may have false-negative HCV antibody results due to the time needed for seroconversion (average of 8 to 9 weeks).   02/14/2023 Nonreactive Nonreactive Final   05/20/2014 Negative NEG Final   , HSV 1/2 IgG: No lab results found. and VZV IgG: No lab results found., CMV IgG:   Recent Labs   Lab Test 06/20/24  1651 02/14/23  0824   CMVIGGIV <0.20 <0.20   CMVIGG No detectable antibody. No detectable antibody.   , and EBV EA IgG: No lab results found.                    Lisa Gilbert MD  [facility]  Contact information available via [facility] Paging/Directory     07/05/2024

## 2024-07-06 ENCOUNTER — TELEPHONE (OUTPATIENT)
Dept: PHARMACY | Facility: CLINIC | Age: 59
End: 2024-07-06
Payer: MEDICARE

## 2024-07-06 VITALS
DIASTOLIC BLOOD PRESSURE: 75 MMHG | RESPIRATION RATE: 16 BRPM | TEMPERATURE: 98.3 F | WEIGHT: 198.7 LBS | BODY MASS INDEX: 33.07 KG/M2 | SYSTOLIC BLOOD PRESSURE: 128 MMHG | OXYGEN SATURATION: 100 % | HEART RATE: 92 BPM

## 2024-07-06 PROBLEM — L27.0: Status: ACTIVE | Noted: 2024-07-06

## 2024-07-06 PROBLEM — E87.70 HYPERVOLEMIA: Status: ACTIVE | Noted: 2024-07-06

## 2024-07-06 LAB
ALBUMIN SERPL BCG-MCNC: 3.1 G/DL (ref 3.5–5.2)
ALP SERPL-CCNC: 161 U/L (ref 40–150)
ALT SERPL W P-5'-P-CCNC: 39 U/L (ref 0–50)
ANION GAP SERPL CALCULATED.3IONS-SCNC: 9 MMOL/L (ref 7–15)
AST SERPL W P-5'-P-CCNC: 22 U/L (ref 0–45)
BACTERIA BLD CULT: NO GROWTH
BACTERIA BLD CULT: NO GROWTH
BASOPHILS # BLD AUTO: ABNORMAL 10*3/UL
BASOPHILS # BLD MANUAL: 0 10E3/UL (ref 0–0.2)
BASOPHILS NFR BLD AUTO: ABNORMAL %
BASOPHILS NFR BLD MANUAL: 0 %
BILIRUB DIRECT SERPL-MCNC: 0.36 MG/DL (ref 0–0.3)
BILIRUB SERPL-MCNC: 0.9 MG/DL
BUN SERPL-MCNC: 8.8 MG/DL (ref 8–23)
CALCIUM SERPL-MCNC: 7.2 MG/DL (ref 8.8–10.2)
CHLORIDE SERPL-SCNC: 103 MMOL/L (ref 98–107)
COCCIDIOIDES AB TITR SER CF: NORMAL {TITER}
CREAT SERPL-MCNC: 0.7 MG/DL (ref 0.51–0.95)
DEPRECATED HCO3 PLAS-SCNC: 24 MMOL/L (ref 22–29)
EGFRCR SERPLBLD CKD-EPI 2021: >90 ML/MIN/1.73M2
EOSINOPHIL # BLD AUTO: ABNORMAL 10*3/UL
EOSINOPHIL # BLD MANUAL: 0.7 10E3/UL (ref 0–0.7)
EOSINOPHIL NFR BLD AUTO: ABNORMAL %
EOSINOPHIL NFR BLD MANUAL: 5 %
ERYTHROCYTE [DISTWIDTH] IN BLOOD BY AUTOMATED COUNT: 22.9 % (ref 10–15)
GLUCOSE SERPL-MCNC: 114 MG/DL (ref 70–99)
HCT VFR BLD AUTO: 22.3 % (ref 35–47)
HGB BLD-MCNC: 7.4 G/DL (ref 11.7–15.7)
IMM GRANULOCYTES # BLD: ABNORMAL 10*3/UL
IMM GRANULOCYTES NFR BLD: ABNORMAL %
LYMPHOCYTES # BLD AUTO: ABNORMAL 10*3/UL
LYMPHOCYTES # BLD MANUAL: 1.4 10E3/UL (ref 0.8–5.3)
LYMPHOCYTES NFR BLD AUTO: ABNORMAL %
LYMPHOCYTES NFR BLD MANUAL: 10 %
MAGNESIUM SERPL-MCNC: 1.4 MG/DL (ref 1.7–2.3)
MCH RBC QN AUTO: 33.2 PG (ref 26.5–33)
MCHC RBC AUTO-ENTMCNC: 33.2 G/DL (ref 31.5–36.5)
MCV RBC AUTO: 100 FL (ref 78–100)
MONOCYTES # BLD AUTO: ABNORMAL 10*3/UL
MONOCYTES # BLD MANUAL: 0.1 10E3/UL (ref 0–1.3)
MONOCYTES NFR BLD AUTO: ABNORMAL %
MONOCYTES NFR BLD MANUAL: 1 %
NEUTROPHILS # BLD AUTO: ABNORMAL 10*3/UL
NEUTROPHILS # BLD MANUAL: 11.3 10E3/UL (ref 1.6–8.3)
NEUTROPHILS NFR BLD AUTO: ABNORMAL %
NEUTROPHILS NFR BLD MANUAL: 84 %
NRBC # BLD AUTO: 0.1 10E3/UL
NRBC # BLD AUTO: 0.1 10E3/UL
NRBC BLD AUTO-RTO: 0 /100
NRBC BLD MANUAL-RTO: 1 %
PHOSPHATE SERPL-MCNC: 2.7 MG/DL (ref 2.5–4.5)
PLAT MORPH BLD: ABNORMAL
PLATELET # BLD AUTO: 338 10E3/UL (ref 150–450)
POLYCHROMASIA BLD QL SMEAR: ABNORMAL
POTASSIUM SERPL-SCNC: 3.7 MMOL/L (ref 3.4–5.3)
PROT SERPL-MCNC: 5.8 G/DL (ref 6.4–8.3)
RBC # BLD AUTO: 2.23 10E6/UL (ref 3.8–5.2)
RBC MORPH BLD: ABNORMAL
SODIUM SERPL-SCNC: 136 MMOL/L (ref 135–145)
VARIANT LYMPHS BLD QL SMEAR: PRESENT
WBC # BLD AUTO: 13.5 10E3/UL (ref 4–11)

## 2024-07-06 PROCEDURE — 250N000013 HC RX MED GY IP 250 OP 250 PS 637: Performed by: STUDENT IN AN ORGANIZED HEALTH CARE EDUCATION/TRAINING PROGRAM

## 2024-07-06 PROCEDURE — 250N000012 HC RX MED GY IP 250 OP 636 PS 637: Performed by: PHYSICIAN ASSISTANT

## 2024-07-06 PROCEDURE — 250N000012 HC RX MED GY IP 250 OP 636 PS 637: Performed by: NURSE PRACTITIONER

## 2024-07-06 PROCEDURE — 80053 COMPREHEN METABOLIC PANEL: CPT | Performed by: NURSE PRACTITIONER

## 2024-07-06 PROCEDURE — 250N000013 HC RX MED GY IP 250 OP 250 PS 637: Performed by: DERMATOLOGY

## 2024-07-06 PROCEDURE — 250N000013 HC RX MED GY IP 250 OP 250 PS 637: Performed by: NURSE PRACTITIONER

## 2024-07-06 PROCEDURE — 250N000011 HC RX IP 250 OP 636: Performed by: NURSE PRACTITIONER

## 2024-07-06 PROCEDURE — 250N000013 HC RX MED GY IP 250 OP 250 PS 637: Performed by: PHYSICIAN ASSISTANT

## 2024-07-06 PROCEDURE — 250N000013 HC RX MED GY IP 250 OP 250 PS 637

## 2024-07-06 PROCEDURE — 85007 BL SMEAR W/DIFF WBC COUNT: CPT | Performed by: NURSE PRACTITIONER

## 2024-07-06 PROCEDURE — 36415 COLL VENOUS BLD VENIPUNCTURE: CPT | Performed by: NURSE PRACTITIONER

## 2024-07-06 PROCEDURE — 83735 ASSAY OF MAGNESIUM: CPT | Performed by: NURSE PRACTITIONER

## 2024-07-06 PROCEDURE — 84100 ASSAY OF PHOSPHORUS: CPT | Performed by: NURSE PRACTITIONER

## 2024-07-06 PROCEDURE — 82248 BILIRUBIN DIRECT: CPT | Performed by: NURSE PRACTITIONER

## 2024-07-06 PROCEDURE — 85027 COMPLETE CBC AUTOMATED: CPT | Performed by: NURSE PRACTITIONER

## 2024-07-06 RX ORDER — EPINEPHRINE 1 MG/ML
0.3 INJECTION, SOLUTION, CONCENTRATE INTRAVENOUS EVERY 5 MIN PRN
Status: CANCELLED | OUTPATIENT
Start: 2024-08-01

## 2024-07-06 RX ORDER — PENTAMIDINE ISETHIONATE 300 MG/300MG
300 INHALANT RESPIRATORY (INHALATION)
Status: SHIPPED
Start: 2024-08-02

## 2024-07-06 RX ORDER — PENTAMIDINE ISETHIONATE 300 MG/300MG
300 INHALANT RESPIRATORY (INHALATION)
Status: CANCELLED
Start: 2024-08-01

## 2024-07-06 RX ORDER — FUROSEMIDE 40 MG
80 TABLET ORAL 2 TIMES DAILY PRN
Status: SHIPPED
Start: 2024-07-06 | End: 2024-07-09

## 2024-07-06 RX ORDER — TACROLIMUS 1 MG/1
3 CAPSULE ORAL 2 TIMES DAILY
Qty: 180 CAPSULE | Refills: 11 | Status: ACTIVE | OUTPATIENT
Start: 2024-07-06 | End: 2024-07-09

## 2024-07-06 RX ORDER — TRIAMCINOLONE ACETONIDE 1 MG/G
OINTMENT TOPICAL 2 TIMES DAILY
Qty: 30 G | Refills: 3 | Status: SHIPPED | OUTPATIENT
Start: 2024-07-06 | End: 2024-07-06

## 2024-07-06 RX ORDER — MAGNESIUM OXIDE 400 MG/1
400 TABLET ORAL 2 TIMES DAILY
Qty: 60 TABLET | Refills: 2 | Status: SHIPPED | OUTPATIENT
Start: 2024-07-06 | End: 2024-07-09

## 2024-07-06 RX ORDER — ALBUTEROL SULFATE 0.83 MG/ML
2.5 SOLUTION RESPIRATORY (INHALATION)
Qty: 90 ML | Refills: 5 | Status: SHIPPED | OUTPATIENT
Start: 2024-08-02

## 2024-07-06 RX ORDER — BUMETANIDE 1 MG/1
1 TABLET ORAL ONCE
Status: DISCONTINUED | OUTPATIENT
Start: 2024-07-06 | End: 2024-07-06

## 2024-07-06 RX ORDER — BUMETANIDE 2 MG/1
2 TABLET ORAL
Status: DISCONTINUED | OUTPATIENT
Start: 2024-07-06 | End: 2024-07-06

## 2024-07-06 RX ORDER — PSYLLIUM SEED (WITH DEXTROSE)
2 POWDER (GRAM) ORAL DAILY
Qty: 60 WAFER | Refills: 2 | Status: SHIPPED | OUTPATIENT
Start: 2024-07-07

## 2024-07-06 RX ORDER — EMOLLIENT BASE
CREAM (GRAM) TOPICAL EVERY 6 HOURS PRN
Qty: 453 G | Refills: 1 | Status: SHIPPED
Start: 2024-07-06

## 2024-07-06 RX ORDER — ALBUTEROL SULFATE 90 UG/1
1-2 AEROSOL, METERED RESPIRATORY (INHALATION)
Status: CANCELLED
Start: 2024-08-01

## 2024-07-06 RX ORDER — ALBUTEROL SULFATE 0.83 MG/ML
2.5 SOLUTION RESPIRATORY (INHALATION)
Status: CANCELLED | OUTPATIENT
Start: 2024-08-01

## 2024-07-06 RX ORDER — DIPHENHYDRAMINE HYDROCHLORIDE 50 MG/ML
50 INJECTION INTRAMUSCULAR; INTRAVENOUS
Status: CANCELLED
Start: 2024-08-01

## 2024-07-06 RX ORDER — ALBUTEROL SULFATE 0.83 MG/ML
2.5 SOLUTION RESPIRATORY (INHALATION) ONCE
Status: CANCELLED
Start: 2024-08-01 | End: 2024-08-01

## 2024-07-06 RX ORDER — FUROSEMIDE 40 MG
80 TABLET ORAL 2 TIMES DAILY PRN
Qty: 30 TABLET | Refills: 1 | Status: SHIPPED | OUTPATIENT
Start: 2024-07-06 | End: 2024-07-06

## 2024-07-06 RX ORDER — METHYLPREDNISOLONE SODIUM SUCCINATE 125 MG/2ML
125 INJECTION, POWDER, LYOPHILIZED, FOR SOLUTION INTRAMUSCULAR; INTRAVENOUS
Status: CANCELLED
Start: 2024-08-01

## 2024-07-06 RX ORDER — SODIUM BICARBONATE 650 MG/1
650 TABLET ORAL 2 TIMES DAILY
Status: DISCONTINUED | OUTPATIENT
Start: 2024-07-06 | End: 2024-07-06 | Stop reason: HOSPADM

## 2024-07-06 RX ORDER — FUROSEMIDE 80 MG
80 TABLET ORAL
Status: DISCONTINUED | OUTPATIENT
Start: 2024-07-06 | End: 2024-07-06 | Stop reason: HOSPADM

## 2024-07-06 RX ORDER — TACROLIMUS 1 MG/1
3 CAPSULE ORAL 2 TIMES DAILY
Qty: 180 CAPSULE | Refills: 11 | Status: ACTIVE | OUTPATIENT
Start: 2024-07-06 | End: 2024-07-06

## 2024-07-06 RX ORDER — TACROLIMUS 0.5 MG/1
0.5 CAPSULE ORAL 2 TIMES DAILY
Qty: 60 CAPSULE | Refills: 11 | Status: ACTIVE | OUTPATIENT
Start: 2024-07-06 | End: 2024-07-09

## 2024-07-06 RX ADMIN — MAGNESIUM OXIDE TAB 400 MG (241.3 MG ELEMENTAL MG) 800 MG: 400 (241.3 MG) TAB at 08:37

## 2024-07-06 RX ADMIN — HEPARIN SODIUM 5000 UNITS: 5000 INJECTION, SOLUTION INTRAVENOUS; SUBCUTANEOUS at 12:00

## 2024-07-06 RX ADMIN — MYCOPHENOLATE MOFETIL 750 MG: 250 CAPSULE ORAL at 08:36

## 2024-07-06 RX ADMIN — FUROSEMIDE 40 MG: 20 TABLET ORAL at 08:36

## 2024-07-06 RX ADMIN — ASPIRIN 81 MG 81 MG: 81 TABLET ORAL at 08:36

## 2024-07-06 RX ADMIN — METHOCARBAMOL 500 MG: 500 TABLET ORAL at 00:38

## 2024-07-06 RX ADMIN — HEPARIN SODIUM 5000 UNITS: 5000 INJECTION, SOLUTION INTRAVENOUS; SUBCUTANEOUS at 04:22

## 2024-07-06 RX ADMIN — VALGANCICLOVIR HYDROCHLORIDE 450 MG: 450 TABLET ORAL at 08:36

## 2024-07-06 RX ADMIN — Medication 2 WAFER: at 08:37

## 2024-07-06 RX ADMIN — PREDNISONE 5 MG: 5 TABLET ORAL at 08:36

## 2024-07-06 RX ADMIN — URSODIOL 300 MG: 300 CAPSULE ORAL at 08:36

## 2024-07-06 RX ADMIN — TACROLIMUS 3.5 MG: 1 CAPSULE ORAL at 08:36

## 2024-07-06 RX ADMIN — FUROSEMIDE 80 MG: 80 TABLET ORAL at 12:00

## 2024-07-06 RX ADMIN — SODIUM PHOSPHATE, DIBASIC, ANHYDROUS, POTASSIUM PHOSPHATE, MONOBASIC, AND SODIUM PHOSPHATE, MONOBASIC, MONOHYDRATE 500 MG: 852; 155; 130 TABLET, COATED ORAL at 08:37

## 2024-07-06 RX ADMIN — PANTOPRAZOLE SODIUM 40 MG: 40 TABLET, DELAYED RELEASE ORAL at 08:36

## 2024-07-06 RX ADMIN — Medication: at 08:40

## 2024-07-06 RX ADMIN — SODIUM BICARBONATE 650 MG: 650 TABLET ORAL at 08:36

## 2024-07-06 ASSESSMENT — ACTIVITIES OF DAILY LIVING (ADL)
ADLS_ACUITY_SCORE: 33
ADLS_ACUITY_SCORE: 32
ADLS_ACUITY_SCORE: 33
ADLS_ACUITY_SCORE: 32

## 2024-07-06 NOTE — PROGRESS NOTES
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 Other history includes osteopenia, LE cellulitis, crohn's disease on infliximab, and obesity. She underwent DCD DDLT w/ biliary stent placement (on OCS x 19.4 hours) on 6/22/24 with Dr. Ball. She discharged 6/28 and now presents with fever and new patchy induration to extremities and flank.     Plan:  - lasix 80 mg BID  - Plan for discharge Monday  ____________________________________      s/p DCD DDLT +stent 6/22/24: POD #14. LFTs continue to downtrend overall.   - Continue ASA 81 mg daily and ursodiol 300 BID.      Immunosuppressed status secondary to medications:   Maintenance:    -  mg BID   - Tacrolimus goal level 8-12.    - Prednisone 5 mg daily     Neuro:  Acute post op pain: Continue PRN Tylenol, Robaxin, oxycodone (weaning).   Insomnia: Trazodone at bedtime.       Hematology:   Acute blood loss anemia: Hgb 7.5. Transfused 1u RBC on 7/4. Monitor CBC daily.  Eosinophilia: Now resolved. Peripheral smear from 7/3 pending.      Cardiorespiratory:   Pleural effusion, right: Small. Bedside thoracentesis on 7/3 with only 30ml aspirated. Sent for cultures. Transudate by Light's Criteria calculation.     GI/Nutrition:   Moderate malnutrition in the context of chronic illness: due to mild muscle loss, fluid accumulation, low albumin. Continue regular diet with protein supplements.    Endocrine: No issues.      Fluid/Electrolytes:   Hyponatremia: Na 132, stable. Pt currently hypervolemic.  Hypomagnesemia: Continue Mag-Ox 400 mg daily. Mg sulfate 4g IV x 1 today  Metabolic acidosis: Continue sodium bicarb 1300 mg BID.   Hypophosphatemia: Continue phos 500 mg BID.   Hypokalemia: Replaced today. Check BMP after replacement and lasix.  Hypocalcemia: Asymptomatic.  Monitor.  Hypervolemia: Volume given overnight for elevated lactate. Prefer to avoid further volume if possible. Lasix 40mg PO BID today.     Infectious disease:   Fevers of unknown origin: Admitted with fevers and leukocytosis. Infectious workup negative to date. Pleural effusion consistent with transudate. Transplant ID consulted.   - Abx stopped per ID (note 7/3/24), and monitor.    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 Dermatology consulted, likely SDRIFE. Induration improving.   - Start topical triamcinolone ointment 0.1% BID   - HOLD dapsone (has sulfa allergy)   - wound RN consult to evaluate and make recommendation for management blisters buttocks.     Prophylaxis: DVT (mechanical, subcutaneous heparin), fall, GI (PPI), viral (Valcyte), pneumocystis (sulfa allergy, Dapsone discontinued; Pentamidine)     Disposition: 7A, PT consulted. Possible discharge in 1-2 days.    DINESH/Fellow/Resident Provider: AMBER Bethea  #[id number]     Faculty: Benedicto Elizalde M.D.    __________________________________________________________________  Transplant History:    6/22/2024 (Liver), Postoperative day: 14     Interval History: History is obtained from the patient  Overnight events: NAEO    ROS:   A 10-point review of systems was negative except as noted above.    Curent Meds:  Current Facility-Administered Medications   Medication Dose Route Frequency Provider Last Rate Last Admin    albuterol (PROVENTIL) neb solution 2.5 mg  2.5 mg Nebulization Q28 Days Kim Morel PA-C   2.5 mg at 07/05/24 1241    And    pentamidine (NEBUPENT) neb solution 300 mg  300 mg Inhalation Q28 Days Kim Morel PA-C   300 mg at 07/05/24 1301    aspirin (ASA) chewable tablet 81 mg  81 mg Oral Daily Miguelian Weinstein APRN CNP   81 mg at 07/05/24 0852    bumetanide (BUMEX) tablet 2 mg  2 mg Oral BID Kim Morel PA-C         furosemide (LASIX) tablet 40 mg  40 mg Oral BID Kim Morel PA-C   40 mg at 07/05/24 1648    heparin ANTICOAGULANT injection 5,000 Units  5,000 Units Subcutaneous Q8H Philly Webb NP   5,000 Units at 07/06/24 0422    magnesium oxide (MAG-OX) tablet 800 mg  800 mg Oral BID Kim Morel PA-C   800 mg at 07/05/24 1950    mycophenolate (GENERIC EQUIVALENT) capsule 750 mg  750 mg Oral BID Miguelina Weinstein APRN CNP   750 mg at 07/05/24 1950    pantoprazole (PROTONIX) EC tablet 40 mg  40 mg Oral Daily Philly Webb NP   40 mg at 07/05/24 0852    phosphorus tablet 250 mg (PHOSPHA 250 NEUTRAL) per tablet 500 mg  500 mg Oral BID Philly Webb NP   500 mg at 07/05/24 1950    predniSONE (DELTASONE) tablet 5 mg  5 mg Oral Daily Miguelina Weinstein APRN CNP   5 mg at 07/05/24 0852    psyllium (METAMUCIL) wafer 2 Wafer  2 Wafer Oral Daily Philly Webb NP   2 Wafer at 07/05/24 0855    sodium bicarbonate tablet 650 mg  650 mg Oral BID Kim Morel PA-C        sodium chloride (PF) 0.9% PF flush 3 mL  3 mL Intracatheter Q8H Soledad Gomez MD   3 mL at 07/05/24 1958    tacrolimus (GENERIC EQUIVALENT) capsule 3.5 mg  3.5 mg Oral BID IS Kim Morel PA-C   3.5 mg at 07/05/24 1753    traZODone (DESYREL) half-tab 25-50 mg  25-50 mg Oral At Bedtime Fab Velazquez MD   50 mg at 07/02/24 2246    Or    traZODone (DESYREL) tablet 50 mg  50 mg Oral At Bedtime Fab Velazquez MD   50 mg at 07/05/24 2149    triamcinolone (KENALOG) 0.1 % ointment   Topical BID Philly Webb NP   Given at 07/04/24 0851    ursodiol (ACTIGALL) capsule 300 mg  300 mg Oral or Feeding Tube BID Philly Webb NP   300 mg at 07/05/24 1950    valGANciclovir (VALCYTE) tablet 450 mg  450 mg Oral Daily Miguelina Weinstein APRN CNP   450 mg at 07/05/24 0852       Physical Exam:     Current Vitals:   /79 (BP Location: Right arm)   Pulse 96   Temp 98.5  F (36.9  C) (Oral)   Resp 16   Wt  90.1 kg (198 lb 11.2 oz)   LMP 05/31/2006   SpO2 99%   BMI 33.07 kg/m      Vital sign ranges:    Temp:  [98.3  F (36.8  C)-99.2  F (37.3  C)] 98.5  F (36.9  C)  Pulse:  [89-99] 96  Resp:  [16-18] 16  BP: (115-126)/(66-79) 126/79  SpO2:  [97 %-99 %] 99 %    General Appearance: in no apparent distress.   Skin: erythema noted bilaterally on extremities, axilla, flank, back improving. Small bulla on left forearm.  Heart: Perfused  Lungs: unlabored on RA  Abdomen: soft, non-distended. Incision C/D/I.   : no Conti present  Extremities: +1-2 generalized edema  Neurologic: A&OX4. Tremor absent.     Frailty Scores          4/23/2024 2/4/2024 2/14/2023   Frailty Scores   Final Score Not Frail Not Frail Not Frail   Final Score Number 0 0 1      Details                   Data:   CMP  Recent Labs   Lab 07/06/24  0647 07/05/24  1503 07/05/24  0608 06/30/24  1728 06/30/24  1111    134* 132*   < > 128*   POTASSIUM 3.7 4.6 3.2*   < > 4.5   CHLORIDE 103 102 101   < > 97*   CO2 24 24 21*   < > 21*   * 132* 107*   < > 148*   BUN 8.8 8.1 7.5*   < > 10.5   CR 0.70 0.72 0.71   < > 0.70   GFRESTIMATED >90 >90 >90   < > >90   ARIEL 7.2* 7.3* 7.0*   < > 8.2*   MAG 1.4*  --  1.4*   < >  --    PHOS 2.7  --  2.5   < >  --    LIPASE  --   --   --   --  24   ALBUMIN 3.1*  --  2.9*   < > 3.1*   BILITOTAL 0.9  --  1.3*   < > 1.8*   ALKPHOS 161*  --  150   < > 140   AST 22  --  30   < > 25   ALT 39  --  46   < > 114*    < > = values in this interval not displayed.     CBC  Recent Labs   Lab 07/06/24  0724 07/05/24  0608   HGB 7.4* 7.5*   WBC 13.5* 16.2*    330     Attestation:    The patient has been seen with team and evaluated by me.   Vital signs, labs, medications and orders were reviewed.   When obtained, diagnostic images were reviewed by me and interpreted as above.    The care plan was discussed with the multidisciplinary team and I agree with the findings and plan in this note, with any differences recorded in  blue.    Immunosuppressive medication management was reviewed and adjusted as reflected in the note and orders.       Benedicto Elizalde MD  Professor of Surgery

## 2024-07-06 NOTE — TELEPHONE ENCOUNTER
Virginia Matute is a post-liver transplant patient who discharged on 7/6/24. Patient chooses to receive medications from [facility]. The patient and spouse (Albino) will be responsible for managing medications.    SOT*LIAGALEN WALLS) IS A (LIVER) TRANSPLANT PATIENT  (DATE OF TRANSPLANT: (DATE: 6/22/2024) )      HEALTH BENEFIT: MEDICARE  ID#[id number] (PART A EFFECTIVE (DATE: 2/1/2022) , PART B EFFECTIVE (DATE: 12/1/2023) )   PROCESSING INFO: ID#[id number] GRP#OTHER BIN#[id number]  SUPPLEMENTAL HEALTH BENEFIT: (BCBS)  ID#[id number] GRP#[id number] (EFFECTIVE (DATE: 1/1/2024) )  PROCESSING INFO: (BX SECONDARY) ID#[id number] GRP#[id number] BIN#[id number]  PT WILL PAY $0 AT TIME OF SERVICE FOR IMMUNOS     PHARMACY BENEFIT: (WELLCARE) PART D  PROCESSING INFO: ID#[id number] GRP# [id number] PCN#[id number] BIN#[id number] (EFFECTIVE (DATE: 1/1/2024) )   DEDUCTIBLE ($545)    COPAY STRUCTURE  INITIAL PHASE:  $ (5) FOR TIER 1  $ (10) FOR TIER 2  % (25) FOR TIER 3  % (50) COINSURANCE FOR TIER 4  % (25) COINSURANCE FOR TIER 5 MEDICATIONS UNTIL TOTAL YEAR DRUG COSTS REACH $5030  COVERAGE GAP (DONUT HOLE):   %25 COINSURANCE FOR GENERIC  %25 COINSURANCE FOR BRAND UNTIL TOTAL YEARLY PATIENT OUT-OF-POCKET COSTS REACH $8000  CATASTROPHIC PHASE:  PATIENT PAYS 0%     TEST CLAIM SPECIALTY #28  MYCOPHENOLATE 250MG (#240/30DS)=$0 AT TIME OF SERVICE  PROGRAF 1MG (#120/30DS)=$0 AT TIME OF SERVICE  TACROLIMUS 1MG (#120/30DS)=$0 AT TIME OF SERVICE  CYCLOSPORINE 100MG (#60/30DS)=$0 AT TIME OF SERVICE  VALGANCICLOVIR 450MG (#60/30DS)=$360.13  VALACYCLOVIR 1GM (#90/30DS)=$79.13    TEST CLAIM DISCHARGE #27 (18 YEARS AND OLDER):  MYCOPHENOLATE 250MG (#240/30DS)=$0 AT TIME OF SERVICE  PROGRAF 1MG (#120/30DS)=$0 AT TIME OF SERVICE  TACROLIMUS 1MG (#120/30DS)=$0 AT TIME OF SERVICE  CYCLOSPORINE 100MG (#60/30DS)=$0 AT TIME OF SERVICE  VALGANCICLOVIR 450MG (#60/30DS)=$360.13  VALACYCLOVIR 1GM (#90/30DS)=$79.13    Thank You,  Nereida Spence,  PharmD  [facility]  [phone]

## 2024-07-06 NOTE — PLAN OF CARE
Goal Outcome Evaluation:  /77 (BP Location: Right arm)   Pulse 99   Temp 98.6  F (37  C) (Oral)   Resp 16   Wt 89 kg (196 lb 1.6 oz)   LMP 05/31/2006   SpO2 98%   BMI 32.63 kg/m      Shift: [id number]  VS: Vitals stable, room air, afebrile  Neuro: Alert and oriented x4   BG: None   Labs: Awaiting AM labs   Pain/Nausea: Pain managed by prn robaxin x2, denies nausea   Diet: Regular   IV Access: PIV x2 saline locked   GI/: Voiding adequately, LBM 7/5  Skin: Clamshell incision with staples KRISTIAN, primapore over old JACEK site CDI, wound on L bottom, mepilex in place  Mobility: Up with SBA/up ab martinez in room   Plan: Continue with POC and notify team with any changes

## 2024-07-06 NOTE — PROGRESS NOTES
Care Management Discharge Note    Discharge Date: 07/06/2024       Discharge Disposition: Home, Home Care    Discharge Services: Home Care    Discharge DME: None    Discharge Transportation: family or friend will provide    Private pay costs discussed: Not applicable    Does the patient's insurance plan have a 3 day qualifying hospital stay waiver?  No    PAS Confirmation Code:    Patient/family educated on Medicare website which has current facility and service quality ratings: no    Education Provided on the Discharge Plan:    Persons Notified of Discharge Plans: patient  Patient/Family in Agreement with the Plan: yes    Handoff Referral Completed: Yes    Additional Information:  Notified by transplant team pt medically cleared for discharge today.  Updated [facility].    Per Kim [facility] Transplant pt will need OP infusion appointment scheduled for pentamidine due on 8/1/2024.  [employer] message sent.    1500: Met with pt and spouse.  Pt spouse messaged home care RN to schedule home visit for Tuesday 7/9.  Pt has multiple clinic appointments on Monday 7/8.  Pt and spouse noted no concerns or questions regarding plan for discharge.        Home Care  [facility] [phone]  Fax [phone]    Susan Patel RN BSN, PHN, ACM-RN  July 6, 2024  Nurse Coordinator    Phone: [phone]    Social Work and Care Management Department     SEARCHABLE in [facility] - search CARE COORDINATOR     [place] ([phone]) Saturday & Sunday; ([phone]) [facility] Recognized Holidays     Units: 5A Onc Vocera & 5C Vocera     Units: 6B Vocera & 6C Vocera      Units: 7A SOT RNCC Vocera, 7B Med Surg Vocera, & 7C Med Surg Vocera      Units: 6A Vocera & 4A CVICU Vocera, 4C MICU Vocera, and 4E SICU Vocera      Units: 5 Ortho Vocera & 5 Med Surg Vocera      Units: 6 Med Surg Vocera & 8 Med Surg Vocera      7/6/2024

## 2024-07-06 NOTE — DISCHARGE SUMMARY
DISCHARGE:  Patient with orders to discharge to home.     Education Provided:   Med Card updated  Lab Book updated  Handouts reviewed w/ pt  LDAs removed    Discharge instructions, medications & follow ups reviewed with patient and . Copy of discharge summary given to  Albino. PIVs removed.    Patient in stable condition. AVSS. Patient had no further questions regarding discharge instructions and medications. Patient transferred out by staff & left with spouse.  Plan for clinic visit on Monday for labs and transplant follow up.    ANA DELGADO RN on 7/6/2024 at 4:42 PM

## 2024-07-06 NOTE — PROGRESS NOTES
"Verbal order from transplant on call ([facility]) to \"not order lactic if pt triggers sepsis overnight\", pt states she will refuse any lactic draw overnight. Will pass on to day nurse   Marybeth   [phone]   "

## 2024-07-08 ENCOUNTER — OFFICE VISIT (OUTPATIENT)
Dept: TRANSPLANT | Facility: CLINIC | Age: 59
End: 2024-07-08
Attending: TRANSPLANT SURGERY
Payer: MEDICARE

## 2024-07-08 ENCOUNTER — LAB (OUTPATIENT)
Dept: LAB | Facility: CLINIC | Age: 59
End: 2024-07-08
Attending: TRANSPLANT SURGERY
Payer: MEDICARE

## 2024-07-08 ENCOUNTER — TELEPHONE (OUTPATIENT)
Dept: FAMILY MEDICINE | Facility: CLINIC | Age: 59
End: 2024-07-08

## 2024-07-08 VITALS
HEART RATE: 89 BPM | WEIGHT: 186.7 LBS | SYSTOLIC BLOOD PRESSURE: 133 MMHG | OXYGEN SATURATION: 97 % | DIASTOLIC BLOOD PRESSURE: 76 MMHG | TEMPERATURE: 99 F | BODY MASS INDEX: 31.07 KG/M2

## 2024-07-08 DIAGNOSIS — Z94.4 LIVER REPLACED BY TRANSPLANT (H): ICD-10-CM

## 2024-07-08 DIAGNOSIS — Z94.4 S/P LIVER TRANSPLANT (H): ICD-10-CM

## 2024-07-08 DIAGNOSIS — B99.8 OTHER INFECTIOUS DISEASE: ICD-10-CM

## 2024-07-08 LAB
ALBUMIN SERPL BCG-MCNC: 3.5 G/DL (ref 3.5–5.2)
ALP SERPL-CCNC: 148 U/L (ref 40–150)
ALT SERPL W P-5'-P-CCNC: 35 U/L (ref 0–50)
ANION GAP SERPL CALCULATED.3IONS-SCNC: 10 MMOL/L (ref 7–15)
AST SERPL W P-5'-P-CCNC: 19 U/L (ref 0–45)
BACTERIA BLD CULT: NO GROWTH
BACTERIA BLD CULT: NO GROWTH
BACTERIA PLR CULT: NO GROWTH
BILIRUB DIRECT SERPL-MCNC: 0.38 MG/DL (ref 0–0.3)
BILIRUB SERPL-MCNC: 1 MG/DL
BUN SERPL-MCNC: 9 MG/DL (ref 8–23)
CALCIUM SERPL-MCNC: 8.1 MG/DL (ref 8.8–10.2)
CHLORIDE SERPL-SCNC: 97 MMOL/L (ref 98–107)
CREAT SERPL-MCNC: 0.68 MG/DL (ref 0.51–0.95)
DEPRECATED HCO3 PLAS-SCNC: 28 MMOL/L (ref 22–29)
EGFRCR SERPLBLD CKD-EPI 2021: >90 ML/MIN/1.73M2
ERYTHROCYTE [DISTWIDTH] IN BLOOD BY AUTOMATED COUNT: 21.7 % (ref 10–15)
GLUCOSE SERPL-MCNC: 118 MG/DL (ref 70–99)
GRAM STAIN RESULT: NORMAL
GRAM STAIN RESULT: NORMAL
HCT VFR BLD AUTO: 24.9 % (ref 35–47)
HGB BLD-MCNC: 8 G/DL (ref 11.7–15.7)
MAGNESIUM SERPL-MCNC: 1.3 MG/DL (ref 1.7–2.3)
MCH RBC QN AUTO: 32.3 PG (ref 26.5–33)
MCHC RBC AUTO-ENTMCNC: 32.1 G/DL (ref 31.5–36.5)
MCV RBC AUTO: 100 FL (ref 78–100)
PHOSPHATE SERPL-MCNC: 4.1 MG/DL (ref 2.5–4.5)
PLATELET # BLD AUTO: 317 10E3/UL (ref 150–450)
POTASSIUM SERPL-SCNC: 5 MMOL/L (ref 3.4–5.3)
PROT SERPL-MCNC: 7 G/DL (ref 6.4–8.3)
RBC # BLD AUTO: 2.48 10E6/UL (ref 3.8–5.2)
SODIUM SERPL-SCNC: 135 MMOL/L (ref 135–145)
TACROLIMUS BLD-MCNC: 5.7 UG/L (ref 5–15)
TME LAST DOSE: NORMAL H
TME LAST DOSE: NORMAL H
WBC # BLD AUTO: 11.2 10E3/UL (ref 4–11)

## 2024-07-08 PROCEDURE — 99000 SPECIMEN HANDLING OFFICE-LAB: CPT | Performed by: PATHOLOGY

## 2024-07-08 PROCEDURE — 85041 AUTOMATED RBC COUNT: CPT | Performed by: PATHOLOGY

## 2024-07-08 PROCEDURE — 85048 AUTOMATED LEUKOCYTE COUNT: CPT | Performed by: PATHOLOGY

## 2024-07-08 PROCEDURE — 83735 ASSAY OF MAGNESIUM: CPT | Performed by: PATHOLOGY

## 2024-07-08 PROCEDURE — 80197 ASSAY OF TACROLIMUS: CPT | Performed by: TRANSPLANT SURGERY

## 2024-07-08 PROCEDURE — 85014 HEMATOCRIT: CPT | Performed by: PATHOLOGY

## 2024-07-08 PROCEDURE — G0463 HOSPITAL OUTPT CLINIC VISIT: HCPCS | Performed by: TRANSPLANT SURGERY

## 2024-07-08 PROCEDURE — 85018 HEMOGLOBIN: CPT | Performed by: PATHOLOGY

## 2024-07-08 PROCEDURE — 99213 OFFICE O/P EST LOW 20 MIN: CPT | Mod: 24 | Performed by: TRANSPLANT SURGERY

## 2024-07-08 PROCEDURE — 36415 COLL VENOUS BLD VENIPUNCTURE: CPT | Performed by: PATHOLOGY

## 2024-07-08 PROCEDURE — 82248 BILIRUBIN DIRECT: CPT | Performed by: PATHOLOGY

## 2024-07-08 PROCEDURE — 99207 PR NO BILLABLE SERVICE THIS VISIT: CPT

## 2024-07-08 PROCEDURE — 80053 COMPREHEN METABOLIC PANEL: CPT | Performed by: PATHOLOGY

## 2024-07-08 PROCEDURE — 84100 ASSAY OF PHOSPHORUS: CPT | Performed by: PATHOLOGY

## 2024-07-08 PROCEDURE — 85049 AUTOMATED PLATELET COUNT: CPT | Performed by: PATHOLOGY

## 2024-07-08 PROCEDURE — 87516 HEPATITIS B DNA AMP PROBE: CPT | Performed by: TRANSPLANT SURGERY

## 2024-07-08 NOTE — TELEPHONE ENCOUNTER
Home Health Care      Reason for call:  [facility] - Order #[id number] - Cert Period: 06/29/24 - 08/27/24 - SN 1wk1    Orders are needed for this patient.  above    Pt Provider: Renae    Phone Number Homecare Nurse can be reached at: [phone]      Could we send this information to you in [employer] or would you prefer to receive a phone call?:   na    Put in providers in box w med rec and letter    Noemi PAN

## 2024-07-08 NOTE — PROGRESS NOTES
Transplant Social Work Services Progress Note      Collaborated with: Liver Transplant Team    Data: Virginia is s/p  donor Liver Transplant on 2024  Intervention/Education: I met with Virginia and her  Albino in clinic and we reviewed the followin. Writing to the Donor Family process. Briefly discussed.  Patient requested we not discuss this any further, and she will reach out to her coordinator/ if she wants information in the future.        2. Liver Transplant Support Group. Virginia reports she is not currently interested in participating.        3. Immunosuppression copays-no current concerns. Using GoodRx for valganciclovir (part D medication).  I reminded patient/ to contact me if they has future concerns and we can evaluate for financial assistance programs, grants, etc.        4. Adjustment to illness-appropriate to situation.  Virginia has had one readmission since her initial discharge from the hospital, and discharged to home just two days ago.  She has [facility].        5. Importance of maintaining abstinence from all non-prescribed drugs and alcohol.  Assessment: Virginia is at home with her  Albino as her primary care giver.  He is involved and supportive as evidenced by his presence and engagement today.  Plan/recommendations: I will remain available for the psychosocial needs of this patient while Virginia's usual  is on leave (through 24). I requested Zayra Paulino, RN transplant coordinator reach out to pt/ later today to address their questions.          RODGER Henriquez, [facility]  Liver Transplant   Phone [phone]

## 2024-07-08 NOTE — LETTER
7/8/2024      Abiola Matute  [address] [address]      Dear Colleague,    Thank you for referring your patient, Abiola Matute, to the [facility]. Please see a copy of my visit note below.    Transplant Surgery -OUTPATIENT IMMUNOSUPPRESSION PROGRESS NOTE    Date of Visit: 07/08/2024    Transplants:  6/22/2024 (Liver); Postoperative day:  16  ASSESMENT AND PLAN:  1.Graft Function:Liver allograft: no rejection or technical problems.  Increase ursodiol to 1 tid to prevent bile duct problems  2.Immunosuppression Management: keep tacrolimus levels at 10  3.Hypertension:ok  4.Renal Function:better  5.Lab frequency:twice weekly  6.Other:    Pedal edema on lasix 80 mg daily (reduce dose)  Staple removal next week    Date: July 8, 2024    Transplant:  [x]                             Liver [x]                              Kidney []                             Pancreas []                              Other:             Chief Complaint:  Doing well    History of Present Illness:  Patient Active Problem List   Diagnosis     Non morbid obesity due to excess calories     Other isolated or specific phobias     Other congenital malformations of mouth     Contact dermatitis and other eczema, due to unspecified cause     Intestinal infection due to Clostridium difficile     Iron deficiency anemia, unspecified iron deficiency anemia type     Rosacea     Atypical ductal hyperplasia of left breast     Idiopathic thrombocytopenia (H)     Postmenopausal- s/p hysterectomy with BSO - not on HRT secondary to atypical ductal hyperplasia & breast pain -no paps needed     Claustrophobia     S/P total hysterectomy and bilateral salpingo-oophorectomy     Abnormal liver enzymes- ? related to ongoing etoh use/abuse     Essential hypertension with goal blood pressure less than 140/90     Gastroenteritis     Stool incontinence     CARDIOVASCULAR SCREENING; LDL GOAL LESS THAN 130     AA (alcohol  abuse) - ? ongoing      Alcoholic fatty liver     Acquired hyperbilirubinemia- ? secondary to alcohol use     Diffuse nodular cirrhosis of liver (H)     Severe Perianal Crohn's Disease     Biliary colic     Cholecystitis     Alcoholic cirrhosis of liver with ascites (H) - seeing [facility]      Abscess of anal or rectal region     Anal fistula     HCC (hepatocellular carcinoma) (H)     Alcohol use disorder, moderate, dependence (H)     Crohn's disease of large intestine with fistula (H)     Ascending aorta dilation (H24)     Hyponatremia     Liver cirrhosis (H)     S/P liver transplant (H)     Immunosuppressed status (H24)     Acute post-operative pain     Insomnia     Anemia due to blood loss, acute     Moderate malnutrition (H24)     RADHA (acute kidney injury) (H24)     Hypomagnesemia     Liver replaced by transplant (H)     Fever, unspecified fever cause     Pleural effusion on right     Pneumonia of right lung due to infectious organism, unspecified part of lung     Xerosis cutis     Symmetrical drug-related intertriginous and flexural exanthema     Hypervolemia     SOCIAL /FAMILY HISTORY: [x]                  No recent change    Past Medical History:   Diagnosis Date     Alcohol abuse      Alcoholic cirrhosis of liver with ascites      Anal fistula      Atypical ductal hyperplasia of breast 09/10/2010    ERT not recommended -left - and flat epithelial atypia-scheduled for breast biopsy 9/17/2010      Bifid uvula      Cholelithiasis      Contact perianal dermatitis and other eczema     recurrent - clobetasol      Crohn disease      Fear of flying      - gets Ativan prn.      HCC (hepatocellular carcinoma) (H) 2/1/2023     Hypertension      IBS (irritable bowel syndrome)      Past Surgical History:   Procedure Laterality Date     BENCH LIVER  6/22/2024    Procedure: Bench liver;  Surgeon: Pravin Ball MD;  Location: [facility] OR     BIOPSY ANAL N/A 3/28/2019    anal biopsy and culure placement of cathleen Fleming      BIOPSY BREAST Left 09/17/2010    - scheduled with Dr. Varma      BIOPSY LIVER  2019     COLONOSCOPY  2006     COLONOSCOPY N/A 12/23/2014    Procedure: COMBINED COLONOSCOPY, SINGLE OR MULTIPLE BIOPSY/POLYPECTOMY BY BIOPSY;  Surgeon: Diane Fleming MD;  Location:  GI     COLONOSCOPY N/A 12/5/2019    Procedure: COLONOSCOPY, WITH POLYPECTOMY AND BIOPSY;  Surgeon: Farhan Schilling MD;  Location: [facility] GI     COLONOSCOPY N/A 2/27/2024    Procedure: COLONOSCOPY, WITH POLYPECTOMY AND BIOPSY;  Surgeon: Michelle Diaz MD;  Location: [facility] OR     ENDOSCOPIC RETROGRADE CHOLANGIOPANCREATOGRAM N/A 4/24/2020    Procedure: ENDOSCOPIC RETROGRADE CHOLANGIOPANCREATOGRAPHY WITH, sledge removal,sphincterotomy, stent in gallbladder and pancreatic duct stent, and balloon dilation;  Surgeon: Guru Isac Kraft MD;  Location: [facility] OR     ESOPHAGOSCOPY, GASTROSCOPY, DUODENOSCOPY (EGD), COMBINED N/A 12/5/2019    Procedure: ESOPHAGOGASTRODUODENOSCOPY (EGD);  Surgeon: Farhan Schilling MD;  Location: [facility] GI     ESOPHAGOSCOPY, GASTROSCOPY, DUODENOSCOPY (EGD), COMBINED N/A 5/11/2023    Procedure: Esophagoscopy, gastroscopy, duodenoscopy (EGD), combined;  Surgeon: Jeannette Cervantse MD;  Location: [facility] GI     EXAM UNDER ANESTHESIA ANUS N/A 3/28/2019    Procedure: EXAM UNDER ANESTHESIA ANUS;  Surgeon: Diane Fleming MD;  Location:  OR     EXAM UNDER ANESTHESIA ANUS N/A 2/26/2021    Procedure: EXAM UNDER ANESTHESIA OF ANUS, SETON PLACEMENT, EXCISION OF SKIN BRIDGE;  Surgeon: Avtar Nam MD;  Location: [facility] OR     EXAM UNDER ANESTHESIA ANUS N/A 1/6/2023    Procedure: EXAM UNDER ANESTHESIA, ANUS, SETON EXCHANGE, partial fistulotomy;  Surgeon: Mary Dugan MD;  Location: [facility] OR     HYSTERECTOMY, VAGINAL  2006    with Dr. Licha Zhou - with BSO for fibroids      IR GALLBLADDER DRAIN PLACEMENT  4/22/2020     IR SIRT (SELECTIVE INTERNAL RADIO THERAPY)  2/21/2023     IR VISCERAL  ANGIOGRAM  2023     IR VISCERAL EMBOLIZATION  2023     LAPAROSCOPIC ABLATION LIVER TUMOR N/A 2023    Procedure: Diagnostic Laparoscopy, Laparoscopic microwave ablation of liver tumor intraoperative ultrasound of liver, lysis of adhesions 1 hour,;  Surgeon: Albino Saavedra MD;  Location: [facility] OR     OPEN REDUCTION INTERNAL FIXATION ANKLE Left at age 27    plates and screws removed at age 36     Pelviscopy with removal of bilateral hydrosalpinges.  04/15/2010     TRANSPLANT LIVER RECIPIENT  DONOR N/A 2024    Procedure: Transplant liver recipient  donor, with biliary stent placement;  Surgeon: Pravin Ball MD;  Location: [facility] OR     ZZC APPENDECTOMY  at age 14     Social History     Socioeconomic History     Marital status:      Spouse name: Albino     Number of children: 3     Years of education: 14     Highest education level: Not on file   Occupational History     Occupation: unemployed   Tobacco Use     Smoking status: Never     Passive exposure: Never     Smokeless tobacco: Never   Vaping Use     Vaping status: Never Used   Substance and Sexual Activity     Alcohol use: Not Currently     Drug use: No     Sexual activity: Yes     Partners: Male     Birth control/protection: Post-menopausal     Comment: husb had vasectomy   Other Topics Concern      Service No     Blood Transfusions No     Caffeine Concern No     Comment: rarely drinks caffeine     Occupational Exposure Not Asked     Hobby Hazards Not Asked     Sleep Concern Not Asked     Stress Concern Not Asked     Weight Concern Not Asked     Special Diet Not Asked     Back Care Not Asked     Exercise Yes     Comment: does a lot of walking - 4x/week     Bike Helmet Not Asked     Seat Belt Yes     Comment: always     Self-Exams Yes     Comment: SBE encouraged monthly     Parent/sibling w/ CABG, MI or angioplasty before 65F 55M? Yes   Social History Narrative    calcium - drinks 5-6 large glasses skim  milk/day    flex sig/colonoscopy -at age 49    sun precautions - discussed    mammogram - needs every 2 years in her 30's, then yearly from then on    Td booster - 9/99 and 4/27/2010    pneumovax -at age 59    DEXA -when perimenopausal    stool hemoccults - every year after age 40    ASA- start at age 39    mulvitamin - encouraged     Social Determinants of Health     Financial Resource Strain: Low Risk  (2/4/2024)    Financial Resource Strain      Within the past 12 months, have you or your family members you live with been unable to get utilities (heat, electricity) when it was really needed?: No   Food Insecurity: Low Risk  (2/4/2024)    Food Insecurity      Within the past 12 months, did you worry that your food would run out before you got money to buy more?: No      Within the past 12 months, did the food you bought just not last and you didn t have money to get more?: No   Transportation Needs: Low Risk  (2/4/2024)    Transportation Needs      Within the past 12 months, has lack of transportation kept you from medical appointments, getting your medicines, non-medical meetings or appointments, work, or from getting things that you need?: No   Physical Activity: Unknown (9/15/2020)    Exercise Vital Sign      Days of Exercise per Week: 0 days      Minutes of Exercise per Session: Not on file   Stress: Stress Concern Present (9/15/2020)    [employer] [place] [employer] - Occupational Stress Questionnaire      Feeling of Stress : Rather much   Social Connections: Unknown (9/15/2020)    Social Connection and Isolation Panel [NHANES]      Frequency of Communication with Friends and Family: More than three times a week      Frequency of Social Gatherings with Friends and Family: More than three times a week      Attends Gnosticist Services: Not on file      Active Member of Clubs or Organizations: Not on file      Attends Club or Organization Meetings: Not on file      Marital Status: Not on file    Interpersonal Safety: Low Risk  (2024)    Interpersonal Safety      Do you feel physically and emotionally safe where you currently live?: Yes      Within the past 12 months, have you been hit, slapped, kicked or otherwise physically hurt by someone?: No      Within the past 12 months, have you been humiliated or emotionally abused in other ways by your partner or ex-partner?: No   Housing Stability: Low Risk  (2024)    Housing Stability      Do you have housing? : Yes      Are you worried about losing your housing?: No     Prescription Medications as of 2024         Rx Number Disp Refills Start End Last Dispensed Date Next Fill Date Owning Pharmacy    acetaminophen (TYLENOL) 325 MG tablet  60 tablet 0 2024 --   [address]    Sig: Take 2 tablets (650 mg) by mouth every 6 hours as needed for mild pain    Class: E-Prescribe    Route: Oral    albuterol (PROVENTIL) (2.5 MG/3ML) 0.083% neb solution  90 mL 5 2024 --   [address]    Sig: Take 1 vial (2.5 mg) by nebulization every 28 days    Class: E-Prescribe    Route: Nebulization    aspirin (ASA) 81 MG chewable tablet  30 tablet 5 2024 --   [address]    Si tablet (81 mg) by Oral or Feeding Tube route daily    Class: E-Prescribe    Route: Oral or Feeding Tube    emollient (VANICREAM) external cream  453 g 1 2024 --       Sig: Apply topically every 6 hours as needed for other (Dryness)    Class: No Print Out    Route: Topical    furosemide (LASIX) 40 MG tablet  -- -- 2024 --       Sig: Take 2 tablets (80 mg) by mouth 2 times daily as needed (edema. Hold if weight < or equal to 187lb (85 kg))    Class: No Print Out    Route: Oral    magnesium oxide (MAG-OX) 400 MG tablet  60 tablet 2 2024 --   [place] [address]  [facility]    Sig: Take 1 tablet (400 mg) by mouth 2 times daily    Class: E-Prescribe    Route: Oral    mycophenolate (GENERIC EQUIVALENT) 250 MG capsule  180 capsule 11 2024 --   [address]    Sig: Take 3 capsules (750 mg) by mouth 2 times daily    Class: E-Prescribe    Route: Oral    pantoprazole (PROTONIX) 40 MG EC tablet  30 tablet 2 2024 --   [address]    Sig: Take 1 tablet (40 mg) by mouth daily    Class: E-Prescribe    Route: Oral    Renewals       Renewal requests to authorizing provider (Philly Webb, NP) <b>prohibited</b>            pentamidine (NEBUPENT) 300 MG neb solution  -- -- 2024 --       Sig: Inhale 300 mg into the lungs every 28 days    Class: No Print Out    Route: Inhalation    polyethylene glycol (MIRALAX) 17 GM/Dose powder  510 g 0 2024 --   [address]    Sig: Take 17 g by mouth 2 times daily as needed for constipation    Class: E-Prescribe    Route: Oral    predniSONE (DELTASONE) 5 MG tablet  30 tablet 2 2024 --   [address]    Si tablet (5 mg) by Oral or Feeding Tube route daily    Class: E-Prescribe    Route: Oral or Feeding Tube    psyllium (METAMUCIL) WAFR  60 Wafer 2 2024 --   [address]    Sig: Take 2 Wafers by mouth daily    Class: E-Prescribe    Route: Oral    tacrolimus (GENERIC EQUIVALENT) 0.5 MG capsule  60 capsule 11 2024 --   [address]    Sig: Take 1 capsule (0.5 mg) by mouth 2 times daily Total discharge dose = 3.5 mg BID    Class: E-Prescribe    Route: Oral    tacrolimus (GENERIC EQUIVALENT) 1 MG capsule  180 capsule 11 2024 --   [facility]  [address]    Sig: Take 3 capsules (3 mg) by mouth 2 times daily Total discharge dose = 3.5 mg BID    Class: E-Prescribe    Route: Oral    traZODone (DESYREL) 50 MG tablet  30 tablet 0 2024 --   [facility] - [address]    Sig: Take 0.5-1 tablets (25-50 mg) by mouth nightly as needed for sleep    Class: E-Prescribe    Route: Oral    Renewals       Renewal requests to authorizing provider (Philly Webb NP) <b>prohibited</b>            ursodiol (ACTIGALL) 300 MG capsule  60 capsule 2 2024 --   [facility] - [address]    Si capsule (300 mg) by Oral or Feeding Tube route 2 times daily    Class: E-Prescribe    Route: Oral or Feeding Tube    valGANciclovir (VALCYTE) 450 MG tablet  180 tablet 0 2024 --   [facility] [address] [place] [email or web address]    Sig: Take 1 tablet (450 mg) by mouth daily    Class: E-Prescribe    Route: Oral          Clinic-Administered Medications as of 2024         Dose Frequency Start End    hyoscyamine (LEVSIN/SL) sublingual tablet 125 mcg 125 mcg EVERY 4 HOURS PRN 2022 --    Admin Instructions: Patients using antacids should take hyoscyamine before meals and the antacid after meals.    Route: Sublingual          Blood transfusion related (informational only), Fish oil, Metronidazole, Ppd [tuberculin purified protein derivative], Sulfa antibiotics, and Terbinafine and related   REVIEW OF SYSTEMS (check box if normal)  [x]               GENERAL  [x]                 PULMONARY [x]                GENITOURINARY  [x]                CNS                 [x]                 CARDIAC  [x]                 ENDOCRINE  [x]                EARS,NOSE,THROAT [x]                 GASTROINTESTINAL [x]                 NEUROLOGIC    [x]                MUSCLOSKELTAL  [x]                  HEMATOLOGY      PHYSICAL EXAM (check box if normal)/76 (BP  Location: Right arm, Patient Position: Chair, Cuff Size: Adult Regular)   Pulse 89   Temp 99  F (37.2  C) (Oral)   Wt 84.7 kg (186 lb 11.2 oz)   LMP 05/31/2006   SpO2 97%   BMI 31.07 kg/m          [x]            GENERAL:    [x]       EYES:  ICTERIC   []        YES  []                    NO  [x]           EXTREMITIES: Clubbing []       Y     [x]           N    [x]           EARS, NOSE, THROAT: Membranes Moist    YES   [x]                   NO []                  [x]           LUNGS:  CLEAR    YES       [x]                  NO    []                                [x]           SKIN: Jaundice           YES       []                  NO    [x]                   Rash: YES       []                  NO    [x]                                     [x]             HEART: Regular Rate          YES       [x]                  NO    []                   Incision Clean:  YES       [x]                  NO    []                                [x]                    ABDOMEN: Organomegaly YES       []                  NO    [x]                       [x]                    NEUROLOGICAL:  Nonfocal  YES       [x]                  NO    []                       [x]                    Hernia YES       []                  NO    [x]                   PSYCHIATRIC:  Appropriate  YES       [x]                  NO    []                       OTHER:                                                                                                   PAIN SCALE:: 4       Again, thank you for allowing me to participate in the care of your patient.        Sincerely,        Pravin Ball MD

## 2024-07-08 NOTE — PROGRESS NOTES
Transplant Surgery -OUTPATIENT IMMUNOSUPPRESSION PROGRESS NOTE    Date of Visit: 07/08/2024    Transplants:  6/22/2024 (Liver); Postoperative day:  16  ASSESMENT AND PLAN:  1.Graft Function:Liver allograft: no rejection or technical problems.  Increase ursodiol to 1 tid to prevent bile duct problems  2.Immunosuppression Management: keep tacrolimus levels at 10  3.Hypertension:ok  4.Renal Function:better  5.Lab frequency:twice weekly  6.Other:    Pedal edema on lasix 80 mg daily (reduce dose)  Staple removal next week    Date: July 8, 2024    Transplant:  [x]                             Liver [x]                              Kidney []                             Pancreas []                              Other:             Chief Complaint:  Doing well    History of Present Illness:  Patient Active Problem List   Diagnosis    Non morbid obesity due to excess calories    Other isolated or specific phobias    Other congenital malformations of mouth    Contact dermatitis and other eczema, due to unspecified cause    Intestinal infection due to Clostridium difficile    Iron deficiency anemia, unspecified iron deficiency anemia type    Rosacea    Atypical ductal hyperplasia of left breast    Idiopathic thrombocytopenia (H)    Postmenopausal- s/p hysterectomy with BSO - not on HRT secondary to atypical ductal hyperplasia & breast pain -no paps needed    Claustrophobia    S/P total hysterectomy and bilateral salpingo-oophorectomy    Abnormal liver enzymes- ? related to ongoing etoh use/abuse    Essential hypertension with goal blood pressure less than 140/90    Gastroenteritis    Stool incontinence    CARDIOVASCULAR SCREENING; LDL GOAL LESS THAN 130    AA (alcohol abuse) - ? ongoing     Alcoholic fatty liver    Acquired hyperbilirubinemia- ? secondary to alcohol use    Diffuse nodular cirrhosis of liver (H)    Severe Perianal Crohn's Disease    Biliary colic    Cholecystitis    Alcoholic cirrhosis of liver with ascites (H) -  seeing [facility]     Abscess of anal or rectal region    Anal fistula    HCC (hepatocellular carcinoma) (H)    Alcohol use disorder, moderate, dependence (H)    Crohn's disease of large intestine with fistula (H)    Ascending aorta dilation (H24)    Hyponatremia    Liver cirrhosis (H)    S/P liver transplant (H)    Immunosuppressed status (H24)    Acute post-operative pain    Insomnia    Anemia due to blood loss, acute    Moderate malnutrition (H24)    RADHA (acute kidney injury) (H24)    Hypomagnesemia    Liver replaced by transplant (H)    Fever, unspecified fever cause    Pleural effusion on right    Pneumonia of right lung due to infectious organism, unspecified part of lung    Xerosis cutis    Symmetrical drug-related intertriginous and flexural exanthema    Hypervolemia     SOCIAL /FAMILY HISTORY: [x]                  No recent change    Past Medical History:   Diagnosis Date    Alcohol abuse     Alcoholic cirrhosis of liver with ascites     Anal fistula     Atypical ductal hyperplasia of breast 09/10/2010    ERT not recommended -left - and flat epithelial atypia-scheduled for breast biopsy 9/17/2010     Bifid uvula     Cholelithiasis     Contact perianal dermatitis and other eczema     recurrent - clobetasol     Crohn disease     Fear of flying      - gets Ativan prn.     HCC (hepatocellular carcinoma) (H) 2/1/2023    Hypertension     IBS (irritable bowel syndrome)      Past Surgical History:   Procedure Laterality Date    BENCH LIVER  6/22/2024    Procedure: Bench liver;  Surgeon: Pravin Ball MD;  Location: [facility] OR    BIOPSY ANAL N/A 3/28/2019    anal biopsy and culure placement of seton - Dr Fleming    BIOPSY BREAST Left 09/17/2010    - scheduled with Dr. Varma     BIOPSY LIVER  2019    COLONOSCOPY  2006    COLONOSCOPY N/A 12/23/2014    Procedure: COMBINED COLONOSCOPY, SINGLE OR MULTIPLE BIOPSY/POLYPECTOMY BY BIOPSY;  Surgeon: Diane Fleming MD;  Location:  GI    COLONOSCOPY N/A 12/5/2019     Procedure: COLONOSCOPY, WITH POLYPECTOMY AND BIOPSY;  Surgeon: Farhan Schilling MD;  Location: [facility] GI    COLONOSCOPY N/A 2/27/2024    Procedure: COLONOSCOPY, WITH POLYPECTOMY AND BIOPSY;  Surgeon: Michelle Diaz MD;  Location: [facility] OR    ENDOSCOPIC RETROGRADE CHOLANGIOPANCREATOGRAM N/A 4/24/2020    Procedure: ENDOSCOPIC RETROGRADE CHOLANGIOPANCREATOGRAPHY WITH, sledge removal,sphincterotomy, stent in gallbladder and pancreatic duct stent, and balloon dilation;  Surgeon: Guru Isac Kraft MD;  Location: [facility] OR    ESOPHAGOSCOPY, GASTROSCOPY, DUODENOSCOPY (EGD), COMBINED N/A 12/5/2019    Procedure: ESOPHAGOGASTRODUODENOSCOPY (EGD);  Surgeon: Farhan Schilling MD;  Location: [facility] GI    ESOPHAGOSCOPY, GASTROSCOPY, DUODENOSCOPY (EGD), COMBINED N/A 5/11/2023    Procedure: Esophagoscopy, gastroscopy, duodenoscopy (EGD), combined;  Surgeon: Jeannette Cervantes MD;  Location: [facility] GI    EXAM UNDER ANESTHESIA ANUS N/A 3/28/2019    Procedure: EXAM UNDER ANESTHESIA ANUS;  Surgeon: Diane Fleming MD;  Location:  OR    EXAM UNDER ANESTHESIA ANUS N/A 2/26/2021    Procedure: EXAM UNDER ANESTHESIA OF ANUS, SETON PLACEMENT, EXCISION OF SKIN BRIDGE;  Surgeon: Avtar Nam MD;  Location: [facility] OR    EXAM UNDER ANESTHESIA ANUS N/A 1/6/2023    Procedure: EXAM UNDER ANESTHESIA, ANUS, SETON EXCHANGE, partial fistulotomy;  Surgeon: Mary Dugan MD;  Location: [facility] OR    HYSTERECTOMY, VAGINAL  2006    with Dr. Licha Zhou - with BSO for fibroids     IR GALLBLADDER DRAIN PLACEMENT  4/22/2020    IR SIRT (SELECTIVE INTERNAL RADIO THERAPY)  2/21/2023    IR VISCERAL ANGIOGRAM  2/21/2023    IR VISCERAL EMBOLIZATION  2/27/2023    LAPAROSCOPIC ABLATION LIVER TUMOR N/A 8/29/2023    Procedure: Diagnostic Laparoscopy, Laparoscopic microwave ablation of liver tumor intraoperative ultrasound of liver, lysis of adhesions 1 hour,;  Surgeon: Albino Saavedra MD;  Location: [facility] OR    OPEN REDUCTION  INTERNAL FIXATION ANKLE Left at age 27    plates and screws removed at age 36    Pelviscopy with removal of bilateral hydrosalpinges.  04/15/2010    TRANSPLANT LIVER RECIPIENT  DONOR N/A 2024    Procedure: Transplant liver recipient  donor, with biliary stent placement;  Surgeon: Pravin Ball MD;  Location:  OR    [facility] APPENDECTOMY  at age 14     Social History     Socioeconomic History    Marital status:      Spouse name: Albino    Number of children: 3    Years of education: 14    Highest education level: Not on file   Occupational History    Occupation: unemployed   Tobacco Use    Smoking status: Never     Passive exposure: Never    Smokeless tobacco: Never   Vaping Use    Vaping status: Never Used   Substance and Sexual Activity    Alcohol use: Not Currently    Drug use: No    Sexual activity: Yes     Partners: Male     Birth control/protection: Post-menopausal     Comment: husb had vasectomy   Other Topics Concern     Service No    Blood Transfusions No    Caffeine Concern No     Comment: rarely drinks caffeine    Occupational Exposure Not Asked    Hobby Hazards Not Asked    Sleep Concern Not Asked    Stress Concern Not Asked    Weight Concern Not Asked    Special Diet Not Asked    Back Care Not Asked    Exercise Yes     Comment: does a lot of walking - 4x/week    Bike Helmet Not Asked    Seat Belt Yes     Comment: always    Self-Exams Yes     Comment: SBE encouraged monthly    Parent/sibling w/ CABG, MI or angioplasty before 65F 55M? Yes   Social History Narrative    calcium - drinks 5-6 large glasses skim milk/day    flex sig/colonoscopy -at age 49    sun precautions - discussed    mammogram - needs every 2 years in her 30's, then yearly from then on    Td booster -  and 2010    pneumovax -at age 59    DEXA -when perimenopausal    stool hemoccults - every year after age 40    ASA- start at age 39    mulvitamin - encouraged     Social Determinants of Health      Financial Resource Strain: Low Risk  (2/4/2024)    Financial Resource Strain     Within the past 12 months, have you or your family members you live with been unable to get utilities (heat, electricity) when it was really needed?: No   Food Insecurity: Low Risk  (2/4/2024)    Food Insecurity     Within the past 12 months, did you worry that your food would run out before you got money to buy more?: No     Within the past 12 months, did the food you bought just not last and you didn t have money to get more?: No   Transportation Needs: Low Risk  (2/4/2024)    Transportation Needs     Within the past 12 months, has lack of transportation kept you from medical appointments, getting your medicines, non-medical meetings or appointments, work, or from getting things that you need?: No   Physical Activity: Unknown (9/15/2020)    Exercise Vital Sign     Days of Exercise per Week: 0 days     Minutes of Exercise per Session: Not on file   Stress: Stress Concern Present (9/15/2020)    [employer] [place] [employer] - Occupational Stress Questionnaire     Feeling of Stress : Rather much   Social Connections: Unknown (9/15/2020)    Social Connection and Isolation Panel [NHANES]     Frequency of Communication with Friends and Family: More than three times a week     Frequency of Social Gatherings with Friends and Family: More than three times a week     Attends Latter-day Services: Not on file     Active Member of Clubs or Organizations: Not on file     Attends Club or Organization Meetings: Not on file     Marital Status: Not on file   Interpersonal Safety: Low Risk  (2/7/2024)    Interpersonal Safety     Do you feel physically and emotionally safe where you currently live?: Yes     Within the past 12 months, have you been hit, slapped, kicked or otherwise physically hurt by someone?: No     Within the past 12 months, have you been humiliated or emotionally abused in other ways by your partner or ex-partner?: No    Housing Stability: Low Risk  (2024)    Housing Stability     Do you have housing? : Yes     Are you worried about losing your housing?: No     Prescription Medications as of 2024         Rx Number Disp Refills Start End Last Dispensed Date Next Fill Date Owning Pharmacy    acetaminophen (TYLENOL) 325 MG tablet  60 tablet 0 2024 --   [address]    Sig: Take 2 tablets (650 mg) by mouth every 6 hours as needed for mild pain    Class: E-Prescribe    Route: Oral    albuterol (PROVENTIL) (2.5 MG/3ML) 0.083% neb solution  90 mL 5 2024 --   [address]    Sig: Take 1 vial (2.5 mg) by nebulization every 28 days    Class: E-Prescribe    Route: Nebulization    aspirin (ASA) 81 MG chewable tablet  30 tablet 5 2024 --   [address]    Si tablet (81 mg) by Oral or Feeding Tube route daily    Class: E-Prescribe    Route: Oral or Feeding Tube    emollient (VANICREAM) external cream  453 g 1 2024 --       Sig: Apply topically every 6 hours as needed for other (Dryness)    Class: No Print Out    Route: Topical    furosemide (LASIX) 40 MG tablet  -- -- 2024 --       Sig: Take 2 tablets (80 mg) by mouth 2 times daily as needed (edema. Hold if weight < or equal to 187lb (85 kg))    Class: No Print Out    Route: Oral    magnesium oxide (MAG-OX) 400 MG tablet  60 tablet 2 2024 --   [address]    Sig: Take 1 tablet (400 mg) by mouth 2 times daily    Class: E-Prescribe    Route: Oral    mycophenolate (GENERIC EQUIVALENT) 250 MG capsule  180 capsule 11 2024 --   [address]    Sig: Take 3 capsules (750 mg) by mouth 2 times daily    Class: E-Prescribe    Route: Oral    pantoprazole (PROTONIX) 40 MG EC  tablet  30 tablet 2 2024 --   [address]    Sig: Take 1 tablet (40 mg) by mouth daily    Class: E-Prescribe    Route: Oral    Renewals       Renewal requests to authorizing provider (Philly Webb NP) <b>prohibited</b>            pentamidine (NEBUPENT) 300 MG neb solution  -- -- 2024 --       Sig: Inhale 300 mg into the lungs every 28 days    Class: No Print Out    Route: Inhalation    polyethylene glycol (MIRALAX) 17 GM/Dose powder  510 g 0 2024 --   [address]    Sig: Take 17 g by mouth 2 times daily as needed for constipation    Class: E-Prescribe    Route: Oral    predniSONE (DELTASONE) 5 MG tablet  30 tablet 2 2024 --   [address]    Si tablet (5 mg) by Oral or Feeding Tube route daily    Class: E-Prescribe    Route: Oral or Feeding Tube    psyllium (METAMUCIL) WAFR  60 Wafer 2 2024 --   [address]    Sig: Take 2 Wafers by mouth daily    Class: E-Prescribe    Route: Oral    tacrolimus (GENERIC EQUIVALENT) 0.5 MG capsule  60 capsule 11 2024 --   [address]    Sig: Take 1 capsule (0.5 mg) by mouth 2 times daily Total discharge dose = 3.5 mg BID    Class: E-Prescribe    Route: Oral    tacrolimus (GENERIC EQUIVALENT) 1 MG capsule  180 capsule 11 2024 --   [address]    Sig: Take 3 capsules (3 mg) by mouth 2 times daily Total discharge dose = 3.5 mg BID    Class: E-Prescribe    Route: Oral    traZODone (DESYREL) 50 MG tablet  30 tablet 0 2024 --   [address]    Sig: Take 0.5-1 tablets (25-50 mg) by mouth nightly as needed for sleep    Class: E-Prescribe     Route: Oral    Renewals       Renewal requests to authorizing provider (Philly Webb NP) <b>prohibited</b>            ursodiol (ACTIGALL) 300 MG capsule  60 capsule 2 2024 --   [facility] [place], [place] - [address] [facility]    Si capsule (300 mg) by Oral or Feeding Tube route 2 times daily    Class: E-Prescribe    Route: Oral or Feeding Tube    valGANciclovir (VALCYTE) 450 MG tablet  180 tablet 0 2024 --   [facility] [address] [place] [address]    Sig: Take 1 tablet (450 mg) by mouth daily    Class: E-Prescribe    Route: Oral          Clinic-Administered Medications as of 2024         Dose Frequency Start End    hyoscyamine (LEVSIN/SL) sublingual tablet 125 mcg 125 mcg EVERY 4 HOURS PRN 2022 --    Admin Instructions: Patients using antacids should take hyoscyamine before meals and the antacid after meals.    Route: Sublingual          Blood transfusion related (informational only), Fish oil, Metronidazole, Ppd [tuberculin purified protein derivative], Sulfa antibiotics, and Terbinafine and related   REVIEW OF SYSTEMS (check box if normal)  [x]               GENERAL  [x]                 PULMONARY [x]                GENITOURINARY  [x]                CNS                 [x]                 CARDIAC  [x]                 ENDOCRINE  [x]                EARS,NOSE,THROAT [x]                 GASTROINTESTINAL [x]                 NEUROLOGIC    [x]                MUSCLOSKELTAL  [x]                  HEMATOLOGY      PHYSICAL EXAM (check box if normal)/76 (BP Location: Right arm, Patient Position: Chair, Cuff Size: Adult Regular)   Pulse 89   Temp 99  F (37.2  C) (Oral)   Wt 84.7 kg (186 lb 11.2 oz)   LMP 2006   SpO2 97%   BMI 31.07 kg/m          [x]            GENERAL:    [x]       EYES:  ICTERIC   []        YES  []                    NO  [x]           EXTREMITIES: Clubbing []       Y     [x]           N    [x]           EARS, NOSE,  THROAT: Membranes Moist    YES   [x]                   NO []                  [x]           LUNGS:  CLEAR    YES       [x]                  NO    []                                [x]           SKIN: Jaundice           YES       []                  NO    [x]                   Rash: YES       []                  NO    [x]                                     [x]             HEART: Regular Rate          YES       [x]                  NO    []                   Incision Clean:  YES       [x]                  NO    []                                [x]                    ABDOMEN: Organomegaly YES       []                  NO    [x]                       [x]                    NEUROLOGICAL:  Nonfocal  YES       [x]                  NO    []                       [x]                    Hernia YES       []                  NO    [x]                   PSYCHIATRIC:  Appropriate  YES       [x]                  NO    []                       OTHER:                                                                                                   PAIN SCALE:: 4

## 2024-07-08 NOTE — NURSING NOTE
"Chief Complaint   Patient presents with    Follow Up     Liver transplant follow-up     Vital signs:  Temp: 99  F (37.2  C) Temp src: Oral BP: 133/76 Pulse: 89     SpO2: 97 %       Weight: 84.7 kg (186 lb 11.2 oz)  Estimated body mass index is 31.07 kg/m  as calculated from the following:    Height as of 4/24/24: 1.651 m (5' 5\").    Weight as of this encounter: 84.7 kg (186 lb 11.2 oz).      Dennys Boothe RN on 7/8/2024 at 10:44 AM    "

## 2024-07-08 NOTE — PLAN OF CARE
Physical Therapy Discharge Summary    Reason for therapy discharge:    Discharged to home with home therapy.    Progress towards therapy goal(s). See goals on Care Plan in [facility] electronic health record for goal details.  Goals partially met.  Barriers to achieving goals:   discharge from facility.    Therapy recommendation(s):    Continued therapy is recommended.  Rationale/Recommendations:  Recommend HHPT to progress safety and IND with functional mobility towards PLOF.    Writing therapist did not treat patient, note written based on previous treating therapists notes and recommendations. Please see chart and flowsheet for additional details.

## 2024-07-09 ENCOUNTER — APPOINTMENT (OUTPATIENT)
Dept: CT IMAGING | Facility: CLINIC | Age: 59
DRG: 100 | End: 2024-07-09
Attending: STUDENT IN AN ORGANIZED HEALTH CARE EDUCATION/TRAINING PROGRAM
Payer: MEDICARE

## 2024-07-09 ENCOUNTER — TELEPHONE (OUTPATIENT)
Dept: TRANSPLANT | Facility: CLINIC | Age: 59
End: 2024-07-09
Payer: MEDICARE

## 2024-07-09 ENCOUNTER — HOSPITAL ENCOUNTER (INPATIENT)
Facility: CLINIC | Age: 59
LOS: 2 days | Discharge: HOME HEALTH | DRG: 100 | End: 2024-07-11
Attending: STUDENT IN AN ORGANIZED HEALTH CARE EDUCATION/TRAINING PROGRAM | Admitting: SURGERY
Payer: MEDICARE

## 2024-07-09 ENCOUNTER — VIRTUAL VISIT (OUTPATIENT)
Dept: PHARMACY | Facility: CLINIC | Age: 59
End: 2024-07-09
Payer: COMMERCIAL

## 2024-07-09 DIAGNOSIS — Z94.4 LIVER TRANSPLANTED (H): Primary | ICD-10-CM

## 2024-07-09 DIAGNOSIS — E83.51 HYPOCALCEMIA: ICD-10-CM

## 2024-07-09 DIAGNOSIS — Z94.4 S/P LIVER TRANSPLANT (H): ICD-10-CM

## 2024-07-09 DIAGNOSIS — Z94.4 HISTORY OF LIVER TRANSPLANT (H): ICD-10-CM

## 2024-07-09 DIAGNOSIS — E83.42 HYPOMAGNESEMIA: ICD-10-CM

## 2024-07-09 DIAGNOSIS — R56.9 SEIZURES (H): Primary | ICD-10-CM

## 2024-07-09 DIAGNOSIS — R60.9 EDEMA, UNSPECIFIED TYPE: ICD-10-CM

## 2024-07-09 DIAGNOSIS — R56.9 SEIZURE (H): ICD-10-CM

## 2024-07-09 DIAGNOSIS — R60.9 EDEMA: Primary | ICD-10-CM

## 2024-07-09 DIAGNOSIS — R19.5 LOOSE STOOLS: ICD-10-CM

## 2024-07-09 DIAGNOSIS — G89.18 ACUTE POST-OPERATIVE PAIN: ICD-10-CM

## 2024-07-09 DIAGNOSIS — Z78.9 TAKES DIETARY SUPPLEMENTS: ICD-10-CM

## 2024-07-09 LAB
ALBUMIN SERPL BCG-MCNC: 3.4 G/DL (ref 3.5–5.2)
ALP SERPL-CCNC: 139 U/L (ref 40–150)
ALT SERPL W P-5'-P-CCNC: 28 U/L (ref 0–50)
AMMONIA PLAS-SCNC: 16 UMOL/L (ref 11–51)
ANION GAP SERPL CALCULATED.3IONS-SCNC: 12 MMOL/L (ref 7–15)
APTT PPP: 27 SECONDS (ref 22–38)
AST SERPL W P-5'-P-CCNC: 24 U/L (ref 0–45)
ATRIAL RATE - MUSE: 115 BPM
BASE EXCESS BLDV CALC-SCNC: 3.6 MMOL/L (ref -3–3)
BASOPHILS # BLD AUTO: 0.1 10E3/UL (ref 0–0.2)
BASOPHILS NFR BLD AUTO: 1 %
BILIRUB DIRECT SERPL-MCNC: 0.33 MG/DL (ref 0–0.3)
BILIRUB SERPL-MCNC: 0.8 MG/DL
BUN SERPL-MCNC: 12.7 MG/DL (ref 8–23)
CALCIUM SERPL-MCNC: 8 MG/DL (ref 8.8–10.2)
CHLORIDE SERPL-SCNC: 99 MMOL/L (ref 98–107)
CK SERPL-CCNC: 49 U/L (ref 26–192)
CREAT SERPL-MCNC: 0.6 MG/DL (ref 0.51–0.95)
CRP SERPL-MCNC: 60.5 MG/L
DEPRECATED HCO3 PLAS-SCNC: 24 MMOL/L (ref 22–29)
DIASTOLIC BLOOD PRESSURE - MUSE: NORMAL MMHG
EGFRCR SERPLBLD CKD-EPI 2021: >90 ML/MIN/1.73M2
EOSINOPHIL # BLD AUTO: 0.8 10E3/UL (ref 0–0.7)
EOSINOPHIL NFR BLD AUTO: 7 %
ERYTHROCYTE [DISTWIDTH] IN BLOOD BY AUTOMATED COUNT: 21.1 % (ref 10–15)
ERYTHROCYTE [SEDIMENTATION RATE] IN BLOOD BY WESTERGREN METHOD: 95 MM/HR (ref 0–30)
GLUCOSE SERPL-MCNC: 141 MG/DL (ref 70–99)
HBV DNA SERPL QL NAA+PROBE: NORMAL
HCO3 BLDV-SCNC: 28 MMOL/L (ref 21–28)
HCT VFR BLD AUTO: 24.4 % (ref 35–47)
HCV RNA SERPL QL NAA+PROBE: NORMAL
HGB BLD-MCNC: 8 G/DL (ref 11.7–15.7)
HIV1+2 RNA SERPL QL NAA+PROBE: NORMAL
HOLD SPECIMEN: NORMAL
IMM GRANULOCYTES # BLD: 0.2 10E3/UL
IMM GRANULOCYTES NFR BLD: 2 %
INR PPP: 1.02 (ref 0.85–1.15)
INTERPRETATION ECG - MUSE: NORMAL
LACTATE SERPL-SCNC: 1.5 MMOL/L (ref 0.7–2)
LDH SERPL L TO P-CCNC: 272 U/L (ref 0–250)
LIPASE SERPL-CCNC: 44 U/L (ref 13–60)
LYMPHOCYTES # BLD AUTO: 0.9 10E3/UL (ref 0.8–5.3)
LYMPHOCYTES NFR BLD AUTO: 8 %
MAGNESIUM SERPL-MCNC: 1.4 MG/DL (ref 1.7–2.3)
MCH RBC QN AUTO: 33.2 PG (ref 26.5–33)
MCHC RBC AUTO-ENTMCNC: 32.8 G/DL (ref 31.5–36.5)
MCV RBC AUTO: 101 FL (ref 78–100)
MONOCYTES # BLD AUTO: 0.7 10E3/UL (ref 0–1.3)
MONOCYTES NFR BLD AUTO: 6 %
NEUTROPHILS # BLD AUTO: 8.4 10E3/UL (ref 1.6–8.3)
NEUTROPHILS NFR BLD AUTO: 76 %
NRBC # BLD AUTO: 0 10E3/UL
NRBC BLD AUTO-RTO: 0 /100
O2/TOTAL GAS SETTING VFR VENT: 0 %
OXYHGB MFR BLDV: 52 % (ref 70–75)
P AXIS - MUSE: 54 DEGREES
PCO2 BLDV: 42 MM HG (ref 40–50)
PH BLDV: 7.44 [PH] (ref 7.32–7.43)
PHOSPHATE SERPL-MCNC: 2.6 MG/DL (ref 2.5–4.5)
PLATELET # BLD AUTO: 347 10E3/UL (ref 150–450)
PO2 BLDV: 30 MM HG (ref 25–47)
POTASSIUM SERPL-SCNC: 3.6 MMOL/L (ref 3.4–5.3)
PR INTERVAL - MUSE: 162 MS
PROCALCITONIN SERPL IA-MCNC: 0.23 NG/ML
PROT SERPL-MCNC: 7.1 G/DL (ref 6.4–8.3)
QRS DURATION - MUSE: 76 MS
QT - MUSE: 330 MS
QTC - MUSE: 456 MS
R AXIS - MUSE: 8 DEGREES
RBC # BLD AUTO: 2.41 10E6/UL (ref 3.8–5.2)
SAO2 % BLDV: 53.2 % (ref 70–75)
SCANNED LAB RESULT: NORMAL
SODIUM SERPL-SCNC: 135 MMOL/L (ref 135–145)
SYSTOLIC BLOOD PRESSURE - MUSE: NORMAL MMHG
T AXIS - MUSE: 37 DEGREES
TROPONIN T SERPL HS-MCNC: 21 NG/L
TSH SERPL DL<=0.005 MIU/L-ACNC: 3.2 UIU/ML (ref 0.3–4.2)
VENTRICULAR RATE- MUSE: 115 BPM
WBC # BLD AUTO: 11 10E3/UL (ref 4–11)

## 2024-07-09 PROCEDURE — 99207 PR NO CHARGE LOS: CPT | Mod: 93 | Performed by: PHARMACIST

## 2024-07-09 PROCEDURE — 83735 ASSAY OF MAGNESIUM: CPT | Performed by: STUDENT IN AN ORGANIZED HEALTH CARE EDUCATION/TRAINING PROGRAM

## 2024-07-09 PROCEDURE — 93010 ELECTROCARDIOGRAM REPORT: CPT | Performed by: STUDENT IN AN ORGANIZED HEALTH CARE EDUCATION/TRAINING PROGRAM

## 2024-07-09 PROCEDURE — 87799 DETECT AGENT NOS DNA QUANT: CPT | Performed by: STUDENT IN AN ORGANIZED HEALTH CARE EDUCATION/TRAINING PROGRAM

## 2024-07-09 PROCEDURE — 83615 LACTATE (LD) (LDH) ENZYME: CPT | Performed by: STUDENT IN AN ORGANIZED HEALTH CARE EDUCATION/TRAINING PROGRAM

## 2024-07-09 PROCEDURE — 96374 THER/PROPH/DIAG INJ IV PUSH: CPT | Performed by: STUDENT IN AN ORGANIZED HEALTH CARE EDUCATION/TRAINING PROGRAM

## 2024-07-09 PROCEDURE — 82805 BLOOD GASES W/O2 SATURATION: CPT | Performed by: STUDENT IN AN ORGANIZED HEALTH CARE EDUCATION/TRAINING PROGRAM

## 2024-07-09 PROCEDURE — 120N000011 HC R&B TRANSPLANT UMMC

## 2024-07-09 PROCEDURE — 83690 ASSAY OF LIPASE: CPT | Performed by: STUDENT IN AN ORGANIZED HEALTH CARE EDUCATION/TRAINING PROGRAM

## 2024-07-09 PROCEDURE — 82140 ASSAY OF AMMONIA: CPT | Performed by: STUDENT IN AN ORGANIZED HEALTH CARE EDUCATION/TRAINING PROGRAM

## 2024-07-09 PROCEDURE — 85610 PROTHROMBIN TIME: CPT | Performed by: STUDENT IN AN ORGANIZED HEALTH CARE EDUCATION/TRAINING PROGRAM

## 2024-07-09 PROCEDURE — 258N000003 HC RX IP 258 OP 636: Performed by: STUDENT IN AN ORGANIZED HEALTH CARE EDUCATION/TRAINING PROGRAM

## 2024-07-09 PROCEDURE — 85730 THROMBOPLASTIN TIME PARTIAL: CPT | Performed by: STUDENT IN AN ORGANIZED HEALTH CARE EDUCATION/TRAINING PROGRAM

## 2024-07-09 PROCEDURE — 85652 RBC SED RATE AUTOMATED: CPT | Performed by: STUDENT IN AN ORGANIZED HEALTH CARE EDUCATION/TRAINING PROGRAM

## 2024-07-09 PROCEDURE — 93005 ELECTROCARDIOGRAM TRACING: CPT | Performed by: STUDENT IN AN ORGANIZED HEALTH CARE EDUCATION/TRAINING PROGRAM

## 2024-07-09 PROCEDURE — 84145 PROCALCITONIN (PCT): CPT | Performed by: STUDENT IN AN ORGANIZED HEALTH CARE EDUCATION/TRAINING PROGRAM

## 2024-07-09 PROCEDURE — 84443 ASSAY THYROID STIM HORMONE: CPT | Performed by: STUDENT IN AN ORGANIZED HEALTH CARE EDUCATION/TRAINING PROGRAM

## 2024-07-09 PROCEDURE — 87181 SC STD AGAR DILUTION PER AGT: CPT | Performed by: STUDENT IN AN ORGANIZED HEALTH CARE EDUCATION/TRAINING PROGRAM

## 2024-07-09 PROCEDURE — 86140 C-REACTIVE PROTEIN: CPT | Performed by: STUDENT IN AN ORGANIZED HEALTH CARE EDUCATION/TRAINING PROGRAM

## 2024-07-09 PROCEDURE — 36415 COLL VENOUS BLD VENIPUNCTURE: CPT | Performed by: STUDENT IN AN ORGANIZED HEALTH CARE EDUCATION/TRAINING PROGRAM

## 2024-07-09 PROCEDURE — 84100 ASSAY OF PHOSPHORUS: CPT | Performed by: STUDENT IN AN ORGANIZED HEALTH CARE EDUCATION/TRAINING PROGRAM

## 2024-07-09 PROCEDURE — 87149 DNA/RNA DIRECT PROBE: CPT | Performed by: STUDENT IN AN ORGANIZED HEALTH CARE EDUCATION/TRAINING PROGRAM

## 2024-07-09 PROCEDURE — 99291 CRITICAL CARE FIRST HOUR: CPT | Performed by: STUDENT IN AN ORGANIZED HEALTH CARE EDUCATION/TRAINING PROGRAM

## 2024-07-09 PROCEDURE — 80053 COMPREHEN METABOLIC PANEL: CPT | Performed by: STUDENT IN AN ORGANIZED HEALTH CARE EDUCATION/TRAINING PROGRAM

## 2024-07-09 PROCEDURE — 82550 ASSAY OF CK (CPK): CPT | Performed by: STUDENT IN AN ORGANIZED HEALTH CARE EDUCATION/TRAINING PROGRAM

## 2024-07-09 PROCEDURE — 81001 URINALYSIS AUTO W/SCOPE: CPT | Performed by: STUDENT IN AN ORGANIZED HEALTH CARE EDUCATION/TRAINING PROGRAM

## 2024-07-09 PROCEDURE — 70450 CT HEAD/BRAIN W/O DYE: CPT | Mod: 26 | Performed by: STUDENT IN AN ORGANIZED HEALTH CARE EDUCATION/TRAINING PROGRAM

## 2024-07-09 PROCEDURE — 250N000011 HC RX IP 250 OP 636: Performed by: STUDENT IN AN ORGANIZED HEALTH CARE EDUCATION/TRAINING PROGRAM

## 2024-07-09 PROCEDURE — 84484 ASSAY OF TROPONIN QUANT: CPT | Performed by: STUDENT IN AN ORGANIZED HEALTH CARE EDUCATION/TRAINING PROGRAM

## 2024-07-09 PROCEDURE — 83605 ASSAY OF LACTIC ACID: CPT | Performed by: STUDENT IN AN ORGANIZED HEALTH CARE EDUCATION/TRAINING PROGRAM

## 2024-07-09 PROCEDURE — 96361 HYDRATE IV INFUSION ADD-ON: CPT | Performed by: STUDENT IN AN ORGANIZED HEALTH CARE EDUCATION/TRAINING PROGRAM

## 2024-07-09 PROCEDURE — 85025 COMPLETE CBC W/AUTO DIFF WBC: CPT | Performed by: STUDENT IN AN ORGANIZED HEALTH CARE EDUCATION/TRAINING PROGRAM

## 2024-07-09 PROCEDURE — 70450 CT HEAD/BRAIN W/O DYE: CPT | Mod: MA

## 2024-07-09 PROCEDURE — 99285 EMERGENCY DEPT VISIT HI MDM: CPT | Mod: 25 | Performed by: STUDENT IN AN ORGANIZED HEALTH CARE EDUCATION/TRAINING PROGRAM

## 2024-07-09 RX ORDER — LORAZEPAM 2 MG/ML
INJECTION INTRAMUSCULAR
Status: COMPLETED
Start: 2024-07-09 | End: 2024-07-09

## 2024-07-09 RX ORDER — URSODIOL 300 MG/1
300 CAPSULE ORAL 3 TIMES DAILY
Qty: 90 CAPSULE | Refills: 11 | Status: SHIPPED | OUTPATIENT
Start: 2024-07-09 | End: 2024-07-17

## 2024-07-09 RX ORDER — TACROLIMUS 0.5 MG/1
0.5 CAPSULE ORAL 2 TIMES DAILY
Qty: 60 CAPSULE | Refills: 11 | Status: ON HOLD | OUTPATIENT
Start: 2024-07-09 | End: 2024-07-11

## 2024-07-09 RX ORDER — TACROLIMUS 1 MG/1
4 CAPSULE ORAL 2 TIMES DAILY
Qty: 180 CAPSULE | Refills: 11 | Status: ON HOLD | OUTPATIENT
Start: 2024-07-09 | End: 2024-07-11

## 2024-07-09 RX ORDER — MAGNESIUM SULFATE HEPTAHYDRATE 40 MG/ML
2 INJECTION, SOLUTION INTRAVENOUS ONCE
Status: COMPLETED | OUTPATIENT
Start: 2024-07-09 | End: 2024-07-10

## 2024-07-09 RX ORDER — MAGNESIUM OXIDE 400 MG/1
800 TABLET ORAL 2 TIMES DAILY
Qty: 120 TABLET | Refills: 3 | Status: SHIPPED | OUTPATIENT
Start: 2024-07-09 | End: 2024-07-17

## 2024-07-09 RX ORDER — CALCIUM CARBONATE 500 MG/1
1000 TABLET, CHEWABLE ORAL ONCE
Status: COMPLETED | OUTPATIENT
Start: 2024-07-09 | End: 2024-07-10

## 2024-07-09 RX ORDER — FUROSEMIDE 40 MG
80 TABLET ORAL DAILY PRN
Qty: 30 TABLET | Refills: 0 | Status: ON HOLD | OUTPATIENT
Start: 2024-07-09 | End: 2024-07-11

## 2024-07-09 RX ORDER — LEVETIRACETAM 500 MG/5ML
4500 INJECTION, SOLUTION, CONCENTRATE INTRAVENOUS ONCE
Status: COMPLETED | OUTPATIENT
Start: 2024-07-10 | End: 2024-07-10

## 2024-07-09 RX ORDER — LORAZEPAM 2 MG/ML
2 INJECTION INTRAMUSCULAR ONCE
Status: COMPLETED | OUTPATIENT
Start: 2024-07-09 | End: 2024-07-09

## 2024-07-09 RX ORDER — LEVETIRACETAM 750 MG/1
750 TABLET ORAL 2 TIMES DAILY
Status: DISCONTINUED | OUTPATIENT
Start: 2024-07-10 | End: 2024-07-11 | Stop reason: HOSPADM

## 2024-07-09 RX ADMIN — LORAZEPAM 2 MG: 2 INJECTION INTRAMUSCULAR; INTRAVENOUS at 23:14

## 2024-07-09 RX ADMIN — LORAZEPAM 2 MG: 2 INJECTION INTRAMUSCULAR at 23:14

## 2024-07-09 RX ADMIN — SODIUM CHLORIDE, POTASSIUM CHLORIDE, SODIUM LACTATE AND CALCIUM CHLORIDE 1000 ML: 600; 310; 30; 20 INJECTION, SOLUTION INTRAVENOUS at 22:51

## 2024-07-09 ASSESSMENT — COLUMBIA-SUICIDE SEVERITY RATING SCALE - C-SSRS
2. HAVE YOU ACTUALLY HAD ANY THOUGHTS OF KILLING YOURSELF IN THE PAST MONTH?: NO
6. HAVE YOU EVER DONE ANYTHING, STARTED TO DO ANYTHING, OR PREPARED TO DO ANYTHING TO END YOUR LIFE?: NO
1. IN THE PAST MONTH, HAVE YOU WISHED YOU WERE DEAD OR WISHED YOU COULD GO TO SLEEP AND NOT WAKE UP?: NO

## 2024-07-09 ASSESSMENT — ACTIVITIES OF DAILY LIVING (ADL)
ADLS_ACUITY_SCORE: 38
ADLS_ACUITY_SCORE: 38

## 2024-07-09 NOTE — TELEPHONE ENCOUNTER
SLEEP APNEA FOLLOW UP NOTE     Abel Wiggins is a 65 year old male who presents for follow up management of sleep apnea.       HPI     Patient concerns: Patient presents for compliance f/u visit with PAP therapy.  States that therapy is going well and denies any concerns. Notes improvement in symptoms. Family member notes improvement in cognitive function in patient. More energy & alertness when conversating.    Type of Therapy: AutoPAP   Pressure Settin-12  cmH20  Mask Type: Full Face Simplus - M   DME: [facility]  [employer] access - Card to cloud    A Certified Clinical Sleep Educator / Respiratory Therapist  met with the patient at this visit for:  [x] review of daytime sleepiness questionnaire   [x] review of current PAP therapy efficacy and compliance data  [x] education regarding the patient's current sleep condition(s)  [x] adjustment of PAP therapy settings  [x] coordination of future care for patient.     Compliance Download:     DATES: Recent 90 days per unit   % DAYS > 4 HOURS 100 %   Ave Usage (days used) 8 HOURS   95th% PRESSURE 11.7     95th% MASK LEAK 14 L/MIN   AHI 0.9 /HR     Mask comfort and fit appropriate: Yes   Patient denies insufficient or excessive airflow: Yes     [place] Sleepiness Scale:     0 = would never doze  1 = slight chance of dozing  2 = moderate chance of dozing  3 = high chance of dozing    Situation     Chance of Dozing       1. Sitting and reading   1  2. Watching TV    1  3. Sitting, inactive in a public place       (e.g. a theatre or a meeting)  1  4. As a passenger in a car for an hour       without a break    1  5. Lying down to rest in the afternoon      when circumstances permit  1  6. Sitting and talking to someone  0    7. Sitting quietly after lunch without      Alcohol     1  8. In a car, while stopped for a few      Minutes in traffic    0            TOTAL: 6      Past Medical History      Past Medical History:   Diagnosis Date   • Frequent urination at night   Faxed signed form to [employer] #[phone]    Order #[id number]    Copied into HIM[facility] / Filed in [place] / No[facility]     • GERD (gastroesophageal reflux disease)    • HLD (hyperlipidemia)    • Migraine    • Migraines    • ISATU (obstructive sleep apnea)         ALLERGIES:  No Known Allergies  Current Outpatient Medications   Medication Sig   • rosuvastatin (CRESTOR) 10 MG tablet Take 1 tablet by mouth every evening.   • tamsulosin (FLOMAX) 0.4 MG Cap Take 1 capsule by mouth every evening.   • propranolol (INDERAL) 60 MG tablet Take 1 tablet by mouth every 12 hours.   • fluticasone (FLONASE) 50 MCG/ACT nasal spray Spray 1 spray in each nostril daily. SHAKE LIQUID   • finasteride (PROSCAR) 5 MG tablet Take 1 tablet by mouth every evening.   • omeprazole (PrilOSEC) 20 MG capsule Take 1 capsule by mouth daily. Take 30 min before breakfast   • Multiple Vitamin (MULTI-VITAMIN DAILY PO) Take 1 tablet by mouth daily.   • VITAMIN D, ERGOCALCIFEROL, PO Take by mouth daily. OTC strength-dose unknown     No current facility-administered medications for this visit.         Review Of Systems     Review of Systems   Constitutional: Negative.   HENT: Negative.    Eyes: Negative.    Cardiovascular: Negative.    Respiratory: Negative.    Endocrine: Negative.    Skin: Negative.    Musculoskeletal: Negative.    Gastrointestinal: Negative.    Genitourinary: Negative.    Neurological: Negative.    Psychiatric/Behavioral: Negative.      Physical Exam     Vitals:    04/12/23 1406   BP: 114/67   BP Location: LUE - Left upper extremity   Patient Position: Sitting   Cuff Size: Regular   Pulse: 62   Resp: 16   Temp: 97.2 °F (36.2 °C)   SpO2: 98%   Weight: 68.9 kg (152 lb)   Height: 5' 5\" (1.651 m)      Body mass index is 25.29 kg/m².    Neck Circumference: 17 in  Nasal Passages: [x]Clear   [] Congested  Gen: No acute distress. Pleasant and interactive.  Head:  Normocephalic and atraumatic.  Eyes: Conjunctiva and sclera normal.   Heart:  Normal rate and regular rhythm, no murmur.  Lungs:  Normal respiratory rate and effort, breath sounds equal.  Extremities:  No  edema in lower extremities.   Skin: Warm and dry, no rash.  Musculoskeletal:  No joint swelling or tenderness  Psych:  Affect was appropriate to situation and mood was normal.  Neuro:  Alert and oriented, attention and recall preserved, cranial nerves intact, motor strength preserved, coordination intact, sensation preserved, reflexes symmetric, gait normal.    Assessment/Plan:     Problem List Items Addressed This Visit        Sleep    ISATU (obstructive sleep apnea) - Primary     Patient is benefiting from CPAP/BiPAP therapy: Yes   Patient adherent to therapy: Yes   Daytime sleepiness compensated: Yes   Blood pressure controlled: Yes   Weight management progressing or optimal: Yes     RECOMMENDATIONS:  Continue current CPAP/BiPAP: Yes   Replace current mask and supplies.   Mask change or new Rx: none.  Weight Reduction Plan: reviewed with patient    Reviewed compliance report with patient and explained that we are meeting compliance with therapy.  Reviewed the importance of using the CPAP for greater than 4 hours every night.  Reviewed when to change supplies and cleaning process.  All questions and concerns were answered and addressed.    Patient Counseling:     We reviewed the importance of continued treatment of sleep apnea to reduce cardiovascular risk.  The patient was encouraged to wear CPAP/BiPAP Therapy every night for at least 4 hours, to bring their machine to any procedure that requires sedation, and was advised not to drive while sleepy.   We reviewed tips for traveling with CPAP, air leak, nasal congestion, skin irritation, and gas/bloating.  We reviewed cleaning and replacement of supplies in detail.   The correct phone number for DME was given to patient.    Follow Up:     Return in about 1 year (around 4/12/2024).

## 2024-07-09 NOTE — PROGRESS NOTES
"Medication Therapy Management (MTM) Encounter    ASSESSMENT:                            Medication Adherence/Access: recommended caregivers wear gloves while setting up meds to avoid exposure    Liver Transplant:    Stable.    Supplements:   Discussed various protein powders with patient as they are shooting form 100g of protein daily. Patient encouraged to send any other products via [employer].     Loose stools:   Stable.    Edema:   Stable.     Pain:   Stable.    PLAN:                            Recommend Caregivers wear nitrile gloves.   Send me any pictures of protein supplements you would like to try. The Gainful product is only whey protein and should be fine.   [facility] mail order will call you three weeks post discharge, but if your dose changes, call the number on the back of the pill box to talk to them earlier, [phone]. If your doctor sends over a new prescription to the mail order pharmacy you will have to call them to set up the order. They have an Clem called \"[facility]\" as well.     Follow-up: 3 months    SUBJECTIVE/OBJECTIVE:                          Virginia Matute is a 59 year old female seen for a transitions of care visit. She was discharged from [facility] on 7/6 for fever post liver txp. Patient was accompanied by Albino.     Reason for visit: initial post transplant.    Allergies/ADRs: Reviewed in chart  Past Medical History: Reviewed in chart  Tobacco: She reports that she has never smoked. She has never been exposed to tobacco smoke. She has never used smokeless tobacco.  Alcohol: not currently using  THC/CBD: none    Medication Adherence/Access: Patient uses pill box(es), uses reminders/alarms, and has assistance with medication administration: family member, Albino.  Patient takes medications 4 time(s) per day. 8am, 12pm, 6pm, 8pm  Per patient, misses medication 0 times per week.   The patient fills medications at [facility]: YES.    Liver Transplant:    Tacrolimus 4.5 mg twice daily  Mycophenolate " Mofetil 750 mg twice daily  Prednisone 5 mg every morning   Pt reports no side effects  Transplant date: 6/22/24  Vascular Prophylaxis: Aspirin 81mg daily. Denies gastrointestinal bleed sx.   Other meds: Ursodiol 300mg twice daily.   CMV prophylaxis: CrCl >40 mL/minute: Valcyte 450 mg once daily Donor (+), Recipient (-), treat 6 months post tx   Valcyte birth defects discussed: Yes   PJP prophylaxis: Inhaled Pentamidine 300mg q 4 weeks for 6 months post txp. Per Dr. Gilbert: Remains unclear etiology at this time. 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 Primary team holding dapsone, since this was a potential culprit.     PPI use: Pantoprazole 40mg daily. Denies GERD x.   Tx Coordinator: Zayra Paulino Tx MD: Dr. Cervantes, Dr. Ball, Using Med Card: Yes  Immunizations: annual flu shot [id number]; [id number]:  unknown; Prevnar 20: 2022; TDaP:  2020; Shingrix: x2, HBV: no immunity, COVID: Primary x 3    Estimated Creatinine Clearance: 94.6 mL/min (based on SCr of 0.68 mg/dL).    Supplements:   Mag Oxide 800mg twice daily ( 2 hours from MMF) . Increased recently.   Lab Results   Component Value Date    MAG 1.3 (L) 07/08/2024    MAG 1.4 (L) 07/06/2024    MAG 1.4 (L) 07/05/2024     Loose stools:   Metamucil fiber wafers daily.   BMs currently consistent with her normal stools.     Edema:   Furosemide 80mg every morning. Weights gradually declining. Still swelling from waist down/ feet.     Pain:   Has not taken Acetaminophen for 3-4 days.  Pain well controlled at this point.     Today's Vitals: LMP 05/31/2006   ----------------  Post Discharge Medication Reconciliation Status: discharge medications reconciled, continue medications without change.    I spent 30 minutes with this patient today. All changes were made via  collaborative practice agreement with Dr. Ball. A copy of the visit note was provided to the patient's provider(s).    A summary of these recommendations was sent via [employer].    Diann MimsD  [facility] Pharmacist    Phone: [phone]     Telemedicine Visit Details  Type of service:  Telephone visit  Start Time: 3:19 PM  End Time: 3:57 PM     Medication Therapy Recommendations  No medication therapy recommendations to display

## 2024-07-09 NOTE — PATIENT INSTRUCTIONS
"Recommendations from today's MTM visit:                                                      Recommend Caregivers wear nitrile gloves.   Send me any pictures of protein supplements you would like to try. The Gainful product is only whey protein and should be fine.   [email or web address] mail order will call you three weeks post discharge, but if your dose changes, call the number on the back of the pill box to talk to them earlier, [phone]. If your doctor sends over a new prescription to the mail order pharmacy you will have to call them to set up the order. They have an Clem called \"[email or web address]\" as well.     Follow-up: 3 months    It was great speaking with you today.  I value your experience and would be very thankful for your time in providing feedback in our clinic survey. In the next few days, you may receive an email or text message from [email or web address] with a link to a survey related to your  clinical pharmacist.\"     To schedule another MTM appointment, please call the clinic directly or you may call the MTM scheduling line at [phone] or toll-free at [phone].     My Clinical Pharmacist's contact information:                                                      Please feel free to contact me with any questions or concerns you have.      Demetri Mariee, James  MTM Pharmacist    Phone: [phone]     "

## 2024-07-09 NOTE — TELEPHONE ENCOUNTER
Contacted Virginia and no answer. Contacted Albino and spoke with him and Virginia on speaker phone.    Virginia's rash went away. She is feeling ok at this time. No concerns.     Pt's tacro level 5.7 on 7/8. Pt takingTacro 3.5 mg BID. Pt to increase tacro to 4.5mg BID.    Pt's magnesium low. Pt taking 1 tab BID. Pt will increase 2 tablets BID magnesium    [facility] PT/OT and once a week lab draw.    Pt interested in lymphedema referral to due to LE edema. Referral placed.     Pt encouraged to call [facility] to set up refills.

## 2024-07-10 ENCOUNTER — APPOINTMENT (OUTPATIENT)
Dept: MRI IMAGING | Facility: CLINIC | Age: 59
DRG: 100 | End: 2024-07-10
Attending: STUDENT IN AN ORGANIZED HEALTH CARE EDUCATION/TRAINING PROGRAM
Payer: MEDICARE

## 2024-07-10 ENCOUNTER — APPOINTMENT (OUTPATIENT)
Dept: NEUROLOGY | Facility: CLINIC | Age: 59
DRG: 100 | End: 2024-07-10
Payer: MEDICARE

## 2024-07-10 ENCOUNTER — TELEPHONE (OUTPATIENT)
Dept: TRANSPLANT | Facility: CLINIC | Age: 59
End: 2024-07-10

## 2024-07-10 LAB
ALBUMIN SERPL BCG-MCNC: 3 G/DL (ref 3.5–5.2)
ALBUMIN UR-MCNC: NEGATIVE MG/DL
ALP SERPL-CCNC: 125 U/L (ref 40–150)
ALT SERPL W P-5'-P-CCNC: 24 U/L (ref 0–50)
ANION GAP SERPL CALCULATED.3IONS-SCNC: 8 MMOL/L (ref 7–15)
APPEARANCE CSF: CLEAR
APPEARANCE UR: ABNORMAL
AST SERPL W P-5'-P-CCNC: 18 U/L (ref 0–45)
BACTERIA #/AREA URNS HPF: ABNORMAL /HPF
BACTERIA PLR CULT: NORMAL
BILIRUB DIRECT SERPL-MCNC: 0.26 MG/DL (ref 0–0.3)
BILIRUB SERPL-MCNC: 0.6 MG/DL
BILIRUB UR QL STRIP: NEGATIVE
BUN SERPL-MCNC: 10.5 MG/DL (ref 8–23)
C GATTII+NEOFOR DNA CSF QL NAA+NON-PROBE: NEGATIVE
CALCIUM SERPL-MCNC: 8.8 MG/DL (ref 8.8–10.2)
CHLORIDE SERPL-SCNC: 101 MMOL/L (ref 98–107)
CMV DNA CSF QL NAA+NON-PROBE: NEGATIVE
CMV DNA SPEC NAA+PROBE-ACNC: NOT DETECTED IU/ML
COLOR CSF: COLORLESS
COLOR UR AUTO: YELLOW
CREAT SERPL-MCNC: 0.56 MG/DL (ref 0.51–0.95)
DEPRECATED HCO3 PLAS-SCNC: 25 MMOL/L (ref 22–29)
E COLI K1 AG CSF QL: NEGATIVE
EBV DNA SERPL NAA+PROBE-ACNC: NOT DETECTED IU/ML
EGFRCR SERPLBLD CKD-EPI 2021: >90 ML/MIN/1.73M2
ERYTHROCYTE [DISTWIDTH] IN BLOOD BY AUTOMATED COUNT: 21.2 % (ref 10–15)
EV RNA SPEC QL NAA+PROBE: NEGATIVE
GLUCOSE BLDC GLUCOMTR-MCNC: 104 MG/DL (ref 70–99)
GLUCOSE BLDC GLUCOMTR-MCNC: 108 MG/DL (ref 70–99)
GLUCOSE BLDC GLUCOMTR-MCNC: 123 MG/DL (ref 70–99)
GLUCOSE CSF-MCNC: 68 MG/DL (ref 40–70)
GLUCOSE SERPL-MCNC: 106 MG/DL (ref 70–99)
GLUCOSE UR STRIP-MCNC: NEGATIVE MG/DL
GP B STREP DNA CSF QL NAA+NON-PROBE: NEGATIVE
HAEM INFLU DNA CSF QL NAA+NON-PROBE: NEGATIVE
HCT VFR BLD AUTO: 22.6 % (ref 35–47)
HGB BLD-MCNC: 7.3 G/DL (ref 11.7–15.7)
HGB UR QL STRIP: NEGATIVE
HHV6 DNA CSF QL NAA+NON-PROBE: NEGATIVE
HSV1 DNA CSF QL NAA+NON-PROBE: NEGATIVE
HSV2 DNA CSF QL NAA+NON-PROBE: NEGATIVE
KETONES UR STRIP-MCNC: NEGATIVE MG/DL
L MONOCYTOG DNA CSF QL NAA+NON-PROBE: NEGATIVE
LEUKOCYTE ESTERASE UR QL STRIP: ABNORMAL
MAGNESIUM SERPL-MCNC: 2 MG/DL (ref 1.7–2.3)
MCH RBC QN AUTO: 32.9 PG (ref 26.5–33)
MCHC RBC AUTO-ENTMCNC: 32.3 G/DL (ref 31.5–36.5)
MCV RBC AUTO: 102 FL (ref 78–100)
N MEN DNA CSF QL NAA+NON-PROBE: NEGATIVE
NITRATE UR QL: NEGATIVE
PARECHOVIRUS A RNA CSF QL NAA+NON-PROBE: NEGATIVE
PH UR STRIP: 8 [PH] (ref 5–7)
PHOSPHATE SERPL-MCNC: 2.6 MG/DL (ref 2.5–4.5)
PLATELET # BLD AUTO: 314 10E3/UL (ref 150–450)
POTASSIUM SERPL-SCNC: 3.9 MMOL/L (ref 3.4–5.3)
PROT CSF-MCNC: 48.8 MG/DL (ref 15–45)
PROT SERPL-MCNC: 6.3 G/DL (ref 6.4–8.3)
RBC # BLD AUTO: 2.22 10E6/UL (ref 3.8–5.2)
RBC # CSF MANUAL: 0 /UL (ref 0–2)
RBC URINE: 3 /HPF
S PNEUM DNA CSF QL NAA+NON-PROBE: NEGATIVE
SARS-COV-2 RNA RESP QL NAA+PROBE: NEGATIVE
SODIUM SERPL-SCNC: 134 MMOL/L (ref 135–145)
SP GR UR STRIP: 1.01 (ref 1–1.03)
SQUAMOUS EPITHELIAL: 1 /HPF
TUBE # CSF: 4
UROBILINOGEN UR STRIP-MCNC: NORMAL MG/DL
VZV DNA CSF QL NAA+NON-PROBE: NEGATIVE
WBC # BLD AUTO: 9.8 10E3/UL (ref 4–11)
WBC # CSF MANUAL: 0 /UL (ref 0–5)
WBC URINE: 4 /HPF

## 2024-07-10 PROCEDURE — 250N000013 HC RX MED GY IP 250 OP 250 PS 637: Performed by: PHYSICIAN ASSISTANT

## 2024-07-10 PROCEDURE — 009U3ZX DRAINAGE OF SPINAL CANAL, PERCUTANEOUS APPROACH, DIAGNOSTIC: ICD-10-PCS | Performed by: STUDENT IN AN ORGANIZED HEALTH CARE EDUCATION/TRAINING PROGRAM

## 2024-07-10 PROCEDURE — 82945 GLUCOSE OTHER FLUID: CPT | Performed by: PHYSICIAN ASSISTANT

## 2024-07-10 PROCEDURE — 87798 DETECT AGENT NOS DNA AMP: CPT | Performed by: PHYSICIAN ASSISTANT

## 2024-07-10 PROCEDURE — 84100 ASSAY OF PHOSPHORUS: CPT

## 2024-07-10 PROCEDURE — 250N000012 HC RX MED GY IP 250 OP 636 PS 637: Performed by: PHYSICIAN ASSISTANT

## 2024-07-10 PROCEDURE — 70553 MRI BRAIN STEM W/O & W/DYE: CPT | Mod: 26 | Performed by: RADIOLOGY

## 2024-07-10 PROCEDURE — 89050 BODY FLUID CELL COUNT: CPT | Performed by: PHYSICIAN ASSISTANT

## 2024-07-10 PROCEDURE — 82962 GLUCOSE BLOOD TEST: CPT

## 2024-07-10 PROCEDURE — 84157 ASSAY OF PROTEIN OTHER: CPT | Performed by: PHYSICIAN ASSISTANT

## 2024-07-10 PROCEDURE — 36415 COLL VENOUS BLD VENIPUNCTURE: CPT

## 2024-07-10 PROCEDURE — 80076 HEPATIC FUNCTION PANEL: CPT

## 2024-07-10 PROCEDURE — 87205 SMEAR GRAM STAIN: CPT | Performed by: PHYSICIAN ASSISTANT

## 2024-07-10 PROCEDURE — 85041 AUTOMATED RBC COUNT: CPT

## 2024-07-10 PROCEDURE — 62270 DX LMBR SPI PNXR: CPT | Performed by: STUDENT IN AN ORGANIZED HEALTH CARE EDUCATION/TRAINING PROGRAM

## 2024-07-10 PROCEDURE — 99222 1ST HOSP IP/OBS MODERATE 55: CPT | Mod: GC | Performed by: STUDENT IN AN ORGANIZED HEALTH CARE EDUCATION/TRAINING PROGRAM

## 2024-07-10 PROCEDURE — 83735 ASSAY OF MAGNESIUM: CPT

## 2024-07-10 PROCEDURE — 87483 CNS DNA AMP PROBE TYPE 12-25: CPT | Performed by: PHYSICIAN ASSISTANT

## 2024-07-10 PROCEDURE — 250N000013 HC RX MED GY IP 250 OP 250 PS 637

## 2024-07-10 PROCEDURE — 120N000011 HC R&B TRANSPLANT UMMC

## 2024-07-10 PROCEDURE — 255N000002 HC RX 255 OP 636: Performed by: SURGERY

## 2024-07-10 PROCEDURE — A9585 GADOBUTROL INJECTION: HCPCS | Performed by: SURGERY

## 2024-07-10 PROCEDURE — 87635 SARS-COV-2 COVID-19 AMP PRB: CPT

## 2024-07-10 PROCEDURE — 250N000011 HC RX IP 250 OP 636: Performed by: STUDENT IN AN ORGANIZED HEALTH CARE EDUCATION/TRAINING PROGRAM

## 2024-07-10 PROCEDURE — 250N000009 HC RX 250

## 2024-07-10 PROCEDURE — 82374 ASSAY BLOOD CARBON DIOXIDE: CPT

## 2024-07-10 PROCEDURE — 95711 VEEG 2-12 HR UNMONITORED: CPT

## 2024-07-10 PROCEDURE — 70553 MRI BRAIN STEM W/O & W/DYE: CPT | Mod: MA

## 2024-07-10 PROCEDURE — 87529 HSV DNA AMP PROBE: CPT | Performed by: PHYSICIAN ASSISTANT

## 2024-07-10 PROCEDURE — 250N000012 HC RX MED GY IP 250 OP 636 PS 637

## 2024-07-10 PROCEDURE — 250N000012 HC RX MED GY IP 250 OP 636 PS 637: Performed by: SURGERY

## 2024-07-10 PROCEDURE — 250N000013 HC RX MED GY IP 250 OP 250 PS 637: Performed by: STUDENT IN AN ORGANIZED HEALTH CARE EDUCATION/TRAINING PROGRAM

## 2024-07-10 PROCEDURE — 95718 EEG PHYS/QHP 2-12 HR W/VEEG: CPT | Performed by: PSYCHIATRY & NEUROLOGY

## 2024-07-10 PROCEDURE — 99207 EEG VIDEO 2-12 HRS UNMONITORED: CPT | Performed by: PSYCHIATRY & NEUROLOGY

## 2024-07-10 RX ORDER — LIDOCAINE 40 MG/G
CREAM TOPICAL
Status: DISCONTINUED | OUTPATIENT
Start: 2024-07-10 | End: 2024-07-11 | Stop reason: HOSPADM

## 2024-07-10 RX ORDER — PREDNISONE 5 MG/1
5 TABLET ORAL DAILY
Status: DISCONTINUED | OUTPATIENT
Start: 2024-07-10 | End: 2024-07-11 | Stop reason: HOSPADM

## 2024-07-10 RX ORDER — ONDANSETRON 2 MG/ML
4 INJECTION INTRAMUSCULAR; INTRAVENOUS EVERY 6 HOURS PRN
Status: DISCONTINUED | OUTPATIENT
Start: 2024-07-10 | End: 2024-07-11 | Stop reason: HOSPADM

## 2024-07-10 RX ORDER — VALGANCICLOVIR 450 MG/1
450 TABLET, FILM COATED ORAL DAILY
Status: DISCONTINUED | OUTPATIENT
Start: 2024-07-10 | End: 2024-07-11 | Stop reason: HOSPADM

## 2024-07-10 RX ORDER — ONDANSETRON 4 MG/1
4 TABLET, ORALLY DISINTEGRATING ORAL EVERY 6 HOURS PRN
Status: DISCONTINUED | OUTPATIENT
Start: 2024-07-10 | End: 2024-07-11 | Stop reason: HOSPADM

## 2024-07-10 RX ORDER — MAGNESIUM OXIDE 400 MG/1
800 TABLET ORAL 2 TIMES DAILY
Status: DISCONTINUED | OUTPATIENT
Start: 2024-07-10 | End: 2024-07-11 | Stop reason: HOSPADM

## 2024-07-10 RX ORDER — ACETAMINOPHEN 325 MG/1
650 TABLET ORAL EVERY 4 HOURS PRN
Status: DISCONTINUED | OUTPATIENT
Start: 2024-07-10 | End: 2024-07-11 | Stop reason: HOSPADM

## 2024-07-10 RX ORDER — PANTOPRAZOLE SODIUM 40 MG/1
40 TABLET, DELAYED RELEASE ORAL DAILY
Status: DISCONTINUED | OUTPATIENT
Start: 2024-07-10 | End: 2024-07-11 | Stop reason: HOSPADM

## 2024-07-10 RX ORDER — GADOBUTROL 604.72 MG/ML
8.5 INJECTION INTRAVENOUS ONCE
Status: COMPLETED | OUTPATIENT
Start: 2024-07-10 | End: 2024-07-10

## 2024-07-10 RX ORDER — TACROLIMUS 0.5 MG/1
0.5 CAPSULE ORAL 2 TIMES DAILY
Status: DISCONTINUED | OUTPATIENT
Start: 2024-07-10 | End: 2024-07-10

## 2024-07-10 RX ORDER — MYCOPHENOLATE MOFETIL 250 MG/1
750 CAPSULE ORAL 2 TIMES DAILY
Status: DISCONTINUED | OUTPATIENT
Start: 2024-07-10 | End: 2024-07-11 | Stop reason: HOSPADM

## 2024-07-10 RX ORDER — TACROLIMUS 1 MG/1
4 CAPSULE ORAL 2 TIMES DAILY
Status: DISCONTINUED | OUTPATIENT
Start: 2024-07-10 | End: 2024-07-10

## 2024-07-10 RX ORDER — URSODIOL 300 MG/1
300 CAPSULE ORAL 3 TIMES DAILY
Status: DISCONTINUED | OUTPATIENT
Start: 2024-07-10 | End: 2024-07-11 | Stop reason: HOSPADM

## 2024-07-10 RX ADMIN — URSODIOL 300 MG: 300 CAPSULE ORAL at 09:13

## 2024-07-10 RX ADMIN — GADOBUTROL 8.5 ML: 604.72 INJECTION INTRAVENOUS at 04:18

## 2024-07-10 RX ADMIN — LEVETIRACETAM 750 MG: 750 TABLET, FILM COATED ORAL at 22:50

## 2024-07-10 RX ADMIN — PREDNISONE 5 MG: 5 TABLET ORAL at 09:13

## 2024-07-10 RX ADMIN — URSODIOL 300 MG: 300 CAPSULE ORAL at 15:44

## 2024-07-10 RX ADMIN — TACROLIMUS 4.5 MG: 1 CAPSULE ORAL at 18:08

## 2024-07-10 RX ADMIN — LIDOCAINE HYDROCHLORIDE 1 ML: 10 INJECTION, SOLUTION EPIDURAL; INFILTRATION; INTRACAUDAL; PERINEURAL at 14:40

## 2024-07-10 RX ADMIN — MYCOPHENOLATE MOFETIL 750 MG: 250 CAPSULE ORAL at 20:48

## 2024-07-10 RX ADMIN — TACROLIMUS 4.5 MG: 1 CAPSULE ORAL at 09:12

## 2024-07-10 RX ADMIN — MYCOPHENOLATE MOFETIL 750 MG: 250 CAPSULE ORAL at 09:13

## 2024-07-10 RX ADMIN — LEVETIRACETAM 4500 MG: 100 INJECTION, SOLUTION INTRAVENOUS at 00:49

## 2024-07-10 RX ADMIN — MAGNESIUM OXIDE TAB 400 MG (241.3 MG ELEMENTAL MG) 800 MG: 400 (241.3 MG) TAB at 20:48

## 2024-07-10 RX ADMIN — MAGNESIUM SULFATE HEPTAHYDRATE 2 G: 2 INJECTION, SOLUTION INTRAVENOUS at 00:19

## 2024-07-10 RX ADMIN — VALGANCICLOVIR HYDROCHLORIDE 450 MG: 450 TABLET ORAL at 11:38

## 2024-07-10 RX ADMIN — URSODIOL 300 MG: 300 CAPSULE ORAL at 20:48

## 2024-07-10 RX ADMIN — LEVETIRACETAM 750 MG: 750 TABLET, FILM COATED ORAL at 11:38

## 2024-07-10 ASSESSMENT — ACTIVITIES OF DAILY LIVING (ADL)
ADLS_ACUITY_SCORE: 40
ADLS_ACUITY_SCORE: 38
ADLS_ACUITY_SCORE: 40
ADLS_ACUITY_SCORE: 38
ADLS_ACUITY_SCORE: 40

## 2024-07-10 NOTE — ED NOTES
Bed: ED07  Expected date:   Expected time:   Means of arrival:   Comments:  Ems [facility] 544 eta 20 min

## 2024-07-10 NOTE — LETTER
OUTPATIENT LABORATORY TEST ORDER   [facility]  Fax#[phone]     Patient Name: Abiola Matute     YOB: 1965       [facility] MR# [if applicable]: [id number]   Date & Time: June 24, 2024  9:26 AM  Expiration Date: 1 year after date issued         Diagnosis:      Liver Transplant (ICD-10 Z94.4)   Aftercare following organ transplant (ICD-10 Z48.288)   Long term use of medications (ICD-10 Z79.899)   Contact with and (suspected) exposure to other viral communicable                       diseases (Z20.828)      We ask your assistance in obtaining the following laboratory tests, which are part of our routine surveillance program for Solid Organ Transplant patients.      Please fax each result to [phone], same day as resulted/available    Critical lab results page [phone]       Labs 2x/week (Monday/Thursday) x 4 more weeks  CBC with Platelets   Basic Metabolic Panel   Phosphorous  Magnesium  Hepatic panel   Tacrolimus drug level - 12-hour trough, please document time of last dose         At 1-month post-transplant (7/22/2024 or after)  HBV, HCV, HIV by RUTH testing  If unable to conduct RUTH testing, please substitute the following:   Hepatitis B DNA Quantitative, Real-Time PCR   Hepatitis C RNA, Quantitation   HIV 1 RNA, Quantitation   HIV 2 RNA, Quantitation        Labs weekly (Monday or Tuesday) x 1 week  CBC with Platelets   Basic Metabolic Panel   Phosphorous  Magnesium  Hepatic panel   Tacrolimus drug level - 12-hour trough, please document time of last dose           Labs monthly (start July 2024)  Phoshatdylethanol (PeTH)    If you have any questions, please call [facility]- [phone] or [phone], Fax- [phone].      Pravin Ball MD, DEBRA  Professor of Surgery  [employer]  Surgical Director of Liver Transplant Program  Executive Medical Director of Pediatric Transplantation

## 2024-07-10 NOTE — PROCEDURES
[facility]  CAPS PROCEDURE NOTE  Date of Admission:  7/9/2024  Consult Requested by:  Transplant Surgery  Reason for Consult: diagnostic lumbar puncture    Indication/HPI: 58 yo woman with recent DDLT on 6/22/24 admitted with seizure at home; we were asked to perform an LP.    Pre-Procedure Diagnosis:  Seizure    Post-Procedure Diagnosis:  Seizure    Risk Assessment: Average risk, Technically straightforward; patient's anticoagulation has been held according to guidelines based on the agent and platelets and coags are within guidelines    Procedure Outcome:  Successful lumbar puncture at L4 - L5 for 13 mL of CSF.   See additional procedure details below.    The primary covering service should follow up and address any lab results as appropriate.    Nomi Brown MD  [facility]  Securely message with Vocera (more info)  Text page via [email or web address] Paging/Directory   See signed in provider for up to date coverage information      [facility]    Lumbar puncture    Date/Time: 7/10/2024 2:47 PM    Performed by: Nomi Brown MD  Authorized by: Nomi Brown MD    Risks, benefits and alternatives discussed.      PRE-PROCEDURE DETAILS:     Procedure purpose:  Diagnostic    ANESTHESIA (see MAR for exact dosages):     Anesthesia method:  Local infiltration    Local anesthetic:  Lidocaine 1% w/o epi    PROCEDURE DETAILS:     Lumbar space:  L4-L5 interspace    Patient position:  Sitting (attempted LL decub first)    Needle type:  Spinal needle - Quincke tip    Needle length (in):  3.5    Ultrasound guidance: yes      Number of attempts:  4    Total volume (ml):  13    POST-PROCEDURE:     Puncture site:  Adhesive bandage applied      PROCEDURE  Describe Procedure: Attempted laying, obtained blood.  Reprepped and performed sitting with result above.   Patient Tolerance:   Patient tolerated the procedure well with no immediate complications      No results found for this or any previous visit from the past 1 day.

## 2024-07-10 NOTE — TELEPHONE ENCOUNTER
Albino, , called to report that she had a great day. He was folding clothes and they were watching tv and she had a seizure.  She was eating well today. She was drinking soda water and juice.    Albino stated that her hands went above head and neck kinked and he ran to her before she fell over and she was having convulsions. She did not complain of a headache or vision changes prior to the convulsions.      Virginia is in the ambulance on way to ER. Paged Dr Gandhi, tx fellow, of incident and patient enroute.

## 2024-07-10 NOTE — CONSULTS
DAY TEAM ADDENDUM (7/10): Patient re-examined this morning. She is drowsy but awakens to light touch. Awake, alert, oriented to person, place, month, year, follows one step commands, naming and repetition intact, spells WORLD forwards and backwards correctly, accurately calculates number of quarters in $1.75. Motor and sensory exams intact. MRI notable for trace subdural along right occipital lobe, location of which does not fit with semiology of episodes.    Ms. Matute was hospitalized  -  with fever of unknown source. Although she is afebrile on this presentation, fever last week with new seizures this week raises concern for viral CNS infection. Recommendations:    - continue Keppra 750 mg BID  - 3 hour EEG in process, neurology will follow  - verify that valganciclovir covers CNS HSV; if not, would recommend switch to acyclovir; regarding antibiotics, will follow CSF studies regarding need  - lumbar puncture with the following CSF studies (collect 12-16 mL)   - cell count and differential   - protein   - glucose   - meningitis/encephalitis panel   - HSV1 and 2   - VZV   - aerobic bacterial culture and Gram stain   - freeze one tube for further testing as necessary    Patient seen and discussed with Dr. Alonso.    Johan Valdez MD  Neurology Resident PGY-4    General acute hospital  Neurology Consultation    Patient Name:  Abiola Matute  MRN:  [id number]    :  1965  Date of Service:  July 10, 2024  Primary care provider:  Linda Macdonald      Neurology consultation service was asked to see Abiola Matute by Dr. Abdi to evaluate seizure.    Chief Complaint:  seizure    History of Present Illness:   Abiola Matute is a 59 year old female, with history of Crohn's disease on infliximab and cirrhosis secondary to alcohol use c/b HCC s/p liver transplant on 2024, who presents with two events concerning for seizure.    The patient's  reports that she  "was in her baseline state of health post-transplant when, around 8:30 PM on 7/9, the patient suddenly started having rhythmic movements of the bilateral upper extremities that appeared to start with movements of the right upper extremity, had head turning to the right, and then had a period of generalized convulsions with unresponsiveness.  Her eyes were open at the time.  The spell lasted for approximately 3 minutes.  Following cessation of convulsions, the patient slowly returned to her cognitive baseline but was initially confused and lethargic.  Around 11 PM in the emergency department, the patient had another spell essentially identical to the first, but this time being shorter.  The patient was very quickly given lorazepam after the start of her convulsions in the emergency department, and following this event she was very encephalopathic.    The patient's  denies any knowledge of the patient having any infectious symptoms or focal neurologic deficits in the preceding days.  He did mention that the patient has been sleeping very poorly since her transplant, often only getting 3 hours of sleep per night.  The patient's family were convinced that she had not engaged in any use of alcohol or drugs since the transplant.    In terms of seizure risk factors, the patient's  says that he was told the patient had \"spinal meningitis\" as a child.  He denied any knowledge of previous seizures.  The patient has no known history of stroke or intracranial hemorrhage, and no known history of traumatic brain injury.    ROS  A comprehensive ROS was performed and pertinent findings were included in HPI.     PMH  Past Medical History:   Diagnosis Date    Alcohol abuse     Alcoholic cirrhosis of liver with ascites     Anal fistula     Atypical ductal hyperplasia of breast 09/10/2010    ERT not recommended -left - and flat epithelial atypia-scheduled for breast biopsy 9/17/2010     Bifid uvula     Cholelithiasis     " Contact perianal dermatitis and other eczema     recurrent - clobetasol     Crohn disease     Fear of flying      - gets Ativan prn.     HCC (hepatocellular carcinoma) (H) 2/1/2023    Hypertension     IBS (irritable bowel syndrome)      Past Surgical History:   Procedure Laterality Date    BENCH LIVER  6/22/2024    Procedure: Bench liver;  Surgeon: Pravin Ball MD;  Location: [facility] OR    BIOPSY ANAL N/A 3/28/2019    anal biopsy and culure placement of seton - Dr Fleming    BIOPSY BREAST Left 09/17/2010    - scheduled with Dr. Varma     BIOPSY LIVER  2019    COLONOSCOPY  2006    COLONOSCOPY N/A 12/23/2014    Procedure: COMBINED COLONOSCOPY, SINGLE OR MULTIPLE BIOPSY/POLYPECTOMY BY BIOPSY;  Surgeon: Diane Fleming MD;  Location:  GI    COLONOSCOPY N/A 12/5/2019    Procedure: COLONOSCOPY, WITH POLYPECTOMY AND BIOPSY;  Surgeon: Farhan Schilling MD;  Location: [facility] GI    COLONOSCOPY N/A 2/27/2024    Procedure: COLONOSCOPY, WITH POLYPECTOMY AND BIOPSY;  Surgeon: Michelle Diaz MD;  Location: [facility] OR    ENDOSCOPIC RETROGRADE CHOLANGIOPANCREATOGRAM N/A 4/24/2020    Procedure: ENDOSCOPIC RETROGRADE CHOLANGIOPANCREATOGRAPHY WITH, sledge removal,sphincterotomy, stent in gallbladder and pancreatic duct stent, and balloon dilation;  Surgeon: Guru Isac Kraft MD;  Location: [facility] OR    ESOPHAGOSCOPY, GASTROSCOPY, DUODENOSCOPY (EGD), COMBINED N/A 12/5/2019    Procedure: ESOPHAGOGASTRODUODENOSCOPY (EGD);  Surgeon: Farhan Schilling MD;  Location: [facility] GI    ESOPHAGOSCOPY, GASTROSCOPY, DUODENOSCOPY (EGD), COMBINED N/A 5/11/2023    Procedure: Esophagoscopy, gastroscopy, duodenoscopy (EGD), combined;  Surgeon: Jeannette Cervantes MD;  Location: [facility] GI    EXAM UNDER ANESTHESIA ANUS N/A 3/28/2019    Procedure: EXAM UNDER ANESTHESIA ANUS;  Surgeon: Diane Fleming MD;  Location:  OR    EXAM UNDER ANESTHESIA ANUS N/A 2/26/2021    Procedure: EXAM UNDER ANESTHESIA OF ANUS, SETON  PLACEMENT, EXCISION OF SKIN BRIDGE;  Surgeon: Avtar Nam MD;  Location: [facility] OR    EXAM UNDER ANESTHESIA ANUS N/A 2023    Procedure: EXAM UNDER ANESTHESIA, ANUS, SETON EXCHANGE, partial fistulotomy;  Surgeon: Mary Dugan MD;  Location: [facility] OR    HYSTERECTOMY, VAGINAL  2006    with Dr. Licha Zhou - with BSO for fibroids     IR GALLBLADDER DRAIN PLACEMENT  2020    IR SIRT (SELECTIVE INTERNAL RADIO THERAPY)  2023    IR VISCERAL ANGIOGRAM  2023    IR VISCERAL EMBOLIZATION  2023    LAPAROSCOPIC ABLATION LIVER TUMOR N/A 2023    Procedure: Diagnostic Laparoscopy, Laparoscopic microwave ablation of liver tumor intraoperative ultrasound of liver, lysis of adhesions 1 hour,;  Surgeon: Albino Saavedra MD;  Location: [facility] OR    OPEN REDUCTION INTERNAL FIXATION ANKLE Left at age 27    plates and screws removed at age 36    Pelviscopy with removal of bilateral hydrosalpinges.  04/15/2010    TRANSPLANT LIVER RECIPIENT  DONOR N/A 2024    Procedure: Transplant liver recipient  donor, with biliary stent placement;  Surgeon: Pravin Ball MD;  Location: [facility] OR    ZZC APPENDECTOMY  at age 14       Medications   I have personally reviewed the patient's medication list.     Allergies  I have personally reviewed the patient's allergy list.     Social History  Family denies recent history of tobacco, alcohol, or recreational drug use     Family History    Reviewed.      Physical Examination   Vitals: /70   Temp 97.9  F (36.6  C) (Oral)   Resp 20   LMP 2006   SpO2 94%   General: Lying in bed, NAD  Head: NC/AT  Eyes: no icterus, op pink and moist  Cardiac: RRR. Extremities warm, no edema.   Respiratory: non-labored on RA  GI: S/NT/ND  Skin: No rash or lesion on exposed skin  Psych: Mood pleasant, affect congruent  Neuro:  Mental status: The patient was somnolent but she opened her eyes with a loud noise and noxious stimuli.  She mumbled some words  that were not sensible and did not consistently follow commands.  Cranial nerves: PERRL, conjugate gaze and no gaze deviation, face symmetric, tongue/uvula midline.   Motor: Normal bulk and tone. No abnormal movements.  Patient did not follow commands to lift any of her extremities against gravity, but there was equal movement spontaneously in all 4 extremities.    Reflexes: Normal and symmetric.    Sensory: Equal withdrawal to noxious stimuli in the bilateral lower extremities, possible localization to painful stimuli in the upper extremities.  Coordination: Deferred.    Gait: Deferred.    Investigations   I have personally reviewed pertinent labs, tests, and radiological imaging. Discussion of notable findings is included under Impression.     Was patient transferred from outside hospital?   No    Impression  This is a 59 year old female, with history of Crohn's disease on infliximab and cirrhosis secondary to alcohol use c/b HCC s/p liver transplant on 6/22/2024, who presents with two events consisting of generalized convulsions and paroxysmal loss of consciousness.  When she was examined on the evening of 7/9, she was encephalopathic presumably 2/2 a postictal state and recent lorazepam administration.  Laboratory studies were notable for elevated inflammatory markers, but were otherwise fairly unremarkable including normal ammonia and transaminases.  CT of the head was unremarkable.    Given the description of the patient's spell by her family, it is relatively convincing that she may have experienced a seizure on the evening of 7/9 that was then followed by another seizure in the emergency department.  There may be some focality in the semiology, that would localize to the left hemisphere, given a description from witnesses of right head turning and initial right upper extremity movements preceding generalized convulsions. She does have some seizure risk factors, including a history of meningitis as a child,  and there may be a possible seizure trigger with the patient sleeping poorly since her transplant.  However, a robust workup should be completed to ensure that there is not a more concerning cause of seizure in play.  At this point, given that the patient has been afebrile and does not have a leukocytosis, and there was no description of infectious symptoms prior to the patient's seizure, our suspicion for CNS infection is low.  However, given her immunosuppressed state, if she were to develop fever we would have a low threshold to start CNS penetrance antibiotics and arrange for LP.    As the patient had multiple seizures on the day of her presentation, initiation of an antiseizure medication is indicated.    Recommendations  -Load IV levetiracetam 60 mg/kg.  -Initiate levetiracetam maintenance therapy 750 mg twice daily, which can be given either IV or orally.  -Please obtain MRI of the brain, with and without contrast.  -If the patient develops a fever, we would have a low threshold to start CNS penetrance antibiotics and obtain LP.   -We will arrange for video EEG in the morning.  -We appreciate the collaboration of our transplant colleagues, to whose service the patient will be admitted given her very recent liver transplant.    Thank you for involving Neurology in the care of Abiola Matute.  Please do not hesitate to call with questions/concerns (consult pager [phone]).      Patient was discussed overnight with attending neurologist Dr. Clarke. She will be seen during the day by the general neurology team.     oNmi Chao MD  Neurology, PGY2

## 2024-07-10 NOTE — PROGRESS NOTES
Transplant Surgery  Inpatient Daily Progress Note  07/10/2024    Assessment & Plan: Abiola Matute is a 59 year old female with history of HCC in the setting of DUNN/EtOH abuse s/p DCD liver transplant w/ biliary stent placement on 6/22/24 with Dr. Ball, osteopenia, LE cellulitis, Crohn's disease on Infliximab, obesity. Patient discharged on 6/28 and subsequently presented back to [facility] ED 6/30 in septic shock and admitted to the SICU for further workup and diagnostic studies. Infectious workup largely negative, antibiotics were stopped 7/3/24 and she was discharged 7/6/24.     Now admitted 7/9/24 after witnessed grand mal seizure at home with post ictal state. She was brought to [facility] ED for further evaluation and had additional seizure that presented with similar movements around 11:00 PM which resolved after IV benzo administration. Per , since recent discharge denies fevers, chills, abdominal pain, nausea, vomiting, sick contacts, or signs and symptoms of infection. Denies prior seizures nor prior neurological disturbances.     Graft function:LFTs stable  Immunosuppression management:    mg BID  Tac 4.5 BID, will check level tomorrow  Pred 5 mg daily  Neuro: Admitted with seizure, neurology consulted. Keppra loaded and keppra started overnight. Brain MRI reviewed by neurology. EEG monitoring, read pending. Recommend LP to rule out infectious source, completed this afternoon with CAPS. Will follow up.  Hematology: No acute issues  Cardiorespiratory: Stable   GI/Nutrition: Regular diet as tolerated  Endocrine: No acute issues  Fluid/Electrolytes: No IVF.   Hypomagnesemia: Replaced IV overnight  : No maxwell indicated  Infectious disease: No current fevers. Previously admitted with septic shock.  Prophylaxis: DVT, fall, GI, fungal  Disposition: ED, transfer to  when a bed is available    Medical Decision Making: Medium  Subsequent visit 25005 (moderate level decision  making)    DINESH/Fellow/Resident Provider: Abiola Rowland PA-C     Faculty: Haim Geronimo M.D.    __________________________________________________________________  Transplant History: Admitted 7/9/2024 for seizure.   The patient has a history of liver failure due to hepatocelluar carcinoma.    6/22/2024 (Liver), Postoperative day: 18     Interval History: History is obtained from the patient, electronic health record, and patient's family  Overnight events: repeat seizure overnight after presentation to the ED. Feeling tired, but otherwise no major complaints at this time    ROS:   A 10-point review of systems was negative except as noted above.    Curent Meds:  Current Facility-Administered Medications   Medication Dose Route Frequency Provider Last Rate Last Admin    levETIRAcetam (KEPPRA) tablet 750 mg  750 mg Oral BID Matthew Abdi MD   750 mg at 07/10/24 1138    magnesium oxide (MAG-OX) tablet 800 mg  800 mg Oral BID Abiola Rowland PA-C        mycophenolate (GENERIC EQUIVALENT) capsule 750 mg  750 mg Oral BID Nahid Arora MD   750 mg at 07/10/24 0913    pantoprazole (PROTONIX) EC tablet 40 mg  40 mg Oral Daily Abiola Rowland PA-C        predniSONE (DELTASONE) tablet 5 mg  5 mg Oral or Feeding Tube Daily Nahid Arora MD   5 mg at 07/10/24 0913    sodium chloride (PF) 0.9% PF flush 3 mL  3 mL Intracatheter Q8H Nahid Arora MD        tacrolimus (GENERIC EQUIVALENT) capsule 4.5 mg  4.5 mg Oral BID IS Haim Geronimo MD   4.5 mg at 07/10/24 0912    ursodiol (ACTIGALL) capsule 300 mg  300 mg Oral or Feeding Tube TID Nahid Arora MD   300 mg at 07/10/24 0913    valGANciclovir (VALCYTE) tablet 450 mg  450 mg Oral Daily Nahid Arora MD   450 mg at 07/10/24 1138       Physical Exam:     Admit      Current Vitals:   /86 (BP Location: Right arm)   Temp 98.4  F (36.9  C) (Oral)   Resp 20   LMP 05/31/2006   SpO2 97%          Vital sign ranges:    Temp:   [97.9  F (36.6  C)-99.3  F (37.4  C)] 98.4  F (36.9  C)  Resp:  [20] 20  BP: (126-139)/(70-86) 127/86  SpO2:  [92 %-97 %] 97 %  Patient Vitals for the past 24 hrs:   BP Temp Temp src Resp SpO2   07/10/24 0912 127/86 98.4  F (36.9  C) Oral 20 97 %   07/10/24 0430 -- 98  F (36.7  C) Axillary -- --   07/10/24 0340 -- 99.3  F (37.4  C) Axillary -- --   07/09/24 2300 126/70 -- -- -- 94 %   07/09/24 2141 -- 97.9  F (36.6  C) Oral 20 92 %   07/09/24 2139 139/79 -- -- -- --     General Appearance: in no apparent distress.   Skin: normal, warm, some peeling skin on bottom  Heart: Perfused  Lungs: Nonlabored resps on RA  Abdomen: Not examined today  : Voiding  Extremities:   Neurologic: awake, alert, and oriented. Tremor absent.    Frailty Scores          4/23/2024 2/4/2024 2/14/2023   Frailty Scores   Final Score Not Frail Not Frail Not Frail   Final Score Number 0 0 1      Details                   Data:   CMP  Recent Labs   Lab 07/10/24  1155 07/10/24  0928 07/10/24  0656 07/10/24  0538 07/09/24  2202   NA  --   --  134*  --  135   POTASSIUM  --   --  3.9  --  3.6   CHLORIDE  --   --  101  --  99   CO2  --   --  25  --  24   * 108* 106*  --  141*   BUN  --   --  10.5  --  12.7   CR  --   --  0.56  --  0.60   GFRESTIMATED  --   --  >90  --  >90   ARIEL  --   --  8.8  --  8.0*   MAG  --   --   --  2.0 1.4*   PHOS  --   --   --  2.6 2.6   LIPASE  --   --   --   --  44   ALBUMIN  --   --   --  3.0* 3.4*   BILITOTAL  --   --   --  0.6 0.8   ALKPHOS  --   --   --  125 139   AST  --   --   --  18 24   ALT  --   --   --  24 28     CBC  Recent Labs   Lab 07/10/24  0656 07/09/24  2202   HGB 7.3* 8.0*   WBC 9.8 11.0    347     COAGS  Recent Labs   Lab 07/09/24  2202   INR 1.02   PTT 27      Urinalysis  Recent Labs   Lab Test 07/09/24  2252 07/04/24  0059   COLOR Yellow Yellow   APPEARANCE Slightly Cloudy* Clear   URINEGLC Negative Negative   URINEBILI Negative Negative   URINEKETONE Negative Negative   SG 1.010 1.021    UBLD Negative Negative   URINEPH 8.0* 6.0   PROTEIN Negative 30*   NITRITE Negative Negative   LEUKEST Small* Small*   RBCU 3* 1   WBCU 4 14*     Virology:  Hepatitis C Antibody   Date Value Ref Range Status   06/20/2024 Nonreactive Nonreactive Final     Comment:     A nonreactive screening test result does not exclude the possibility of exposure to or infection with HCV. Nonreactive screening test results in individuals with prior exposure to HCV may be due to antibody levels below the limit of detection of this assay or lack of reactivity to the HCV antigens used in this assay. Patients with recent HCV infections (<3 months from time of exposure) may have false-negative HCV antibody results due to the time needed for seroconversion (average of 8 to 9 weeks).   05/20/2014 Negative NEG Final   I have reviewed history, examined patient and discussed plan with the fellow/resident/DINESH.    I concur with the findings in this note.    Total time spent on care: 30 minutes, of which the majority (20 min) were my time

## 2024-07-10 NOTE — MEDICATION SCRIBE - ADMISSION MEDICATION HISTORY
Medication Scribe Admission Medication History    Admission medication history is complete. The information provided in this note is only as accurate as the sources available at the time of the update.    Information Source(s): Hospital records and CareEverywhere/SureScripts via N/A    Pertinent Information:   -Pt was recently discharged from the hospital on 7/6/24, Used discharge summary and double checked with dispense history  -Had a medication scribe history done from this encounter as well on 6/30/24 (see note for details)      Changes made to PTA medication list:  Added: None  Deleted: None  Changed: None    Allergies reviewed with patient and updates made in EHR: no    Medication History Completed By: Myrna Nunez 7/10/2024 5:46 PM    PTA Med List   Medication Sig Last Dose    acetaminophen (TYLENOL) 325 MG tablet Take 2 tablets (650 mg) by mouth every 6 hours as needed for mild pain Unknown at unknown    [START ON 8/2/2024] albuterol (PROVENTIL) (2.5 MG/3ML) 0.083% neb solution Take 1 vial (2.5 mg) by nebulization every 28 days Unknown at unknown    aspirin (ASA) 81 MG chewable tablet 1 tablet (81 mg) by Oral or Feeding Tube route daily Unknown at unknown    emollient (VANICREAM) external cream Apply topically every 6 hours as needed for other (Dryness) Unknown at unknown    furosemide (LASIX) 40 MG tablet Take 2 tablets (80 mg) by mouth daily as needed (edema. Hold if weight < or equal to 187lb (85 kg)) Unknown at unknown    magnesium oxide (MAG-OX) 400 MG tablet Take 2 tablets (800 mg) by mouth 2 times daily Unknown at unknown    mycophenolate (GENERIC EQUIVALENT) 250 MG capsule Take 3 capsules (750 mg) by mouth 2 times daily Unknown at unknown    pantoprazole (PROTONIX) 40 MG EC tablet Take 1 tablet (40 mg) by mouth daily Unknown at unknown    [START ON 8/2/2024] pentamidine (NEBUPENT) 300 MG neb solution Inhale 300 mg into the lungs every 28 days Unknown at unknown    polyethylene glycol (MIRALAX) 17  GM/Dose powder Take 17 g by mouth 2 times daily as needed for constipation Unknown at unknown    predniSONE (DELTASONE) 5 MG tablet 1 tablet (5 mg) by Oral or Feeding Tube route daily Unknown at unknown    psyllium (METAMUCIL) WAFR Take 2 Wafers by mouth daily Unknown at unknown    tacrolimus (GENERIC EQUIVALENT) 0.5 MG capsule Take 1 capsule (0.5 mg) by mouth 2 times daily Total discharge dose = 4.5 mg BID Unknown at unknown    tacrolimus (GENERIC EQUIVALENT) 1 MG capsule Take 4 capsules (4 mg) by mouth 2 times daily Total discharge dose = 4.5 mg BID Unknown at unknown    traZODone (DESYREL) 50 MG tablet Take 0.5-1 tablets (25-50 mg) by mouth nightly as needed for sleep Unknown at unknown    ursodiol (ACTIGALL) 300 MG capsule 1 capsule (300 mg) by Oral or Feeding Tube route 3 times daily Unknown at unknown    valGANciclovir (VALCYTE) 450 MG tablet Take 1 tablet (450 mg) by mouth daily Unknown at unknown

## 2024-07-10 NOTE — PLAN OF CARE
Goal Outcome Evaluation:    Neuro: A&Ox4.   Cardiac: SR. VSS.   Respiratory: On 2L NC sating 97%  GI/: Adequate urine output via purewick. No BM  Diet/appetite: NPO this am. Regular diet this afternoon.   Activity: Up with 1 assist   Pain: Denies   Skin: Buttocks reddened.   LDA's: PIV SL    Had bedside LP to r/o CNS infection. Band aid on lower back. Had VEEG   monitoring for 3 hrs- completed. No seizure activity day shift    Plan: Continue with POC. Notify primary team with changes.

## 2024-07-10 NOTE — TELEPHONE ENCOUNTER
PATRICK Fraga calls to ask for the labs that need to be drawn for pt. Informed her that days when pt sees the surgeon, labs will be performed at the [facility]. But other days to be drawn by home care. [place] confirmed. Lab orders faxed.

## 2024-07-10 NOTE — ED TRIAGE NOTES
Pt BIBA from home for first time seizure. Fell from recliner chair face first. Bit tongue. Post-ictal VSS. No thinners. Liver txp 2 weeks ago 22nd of June     Triage Assessment (Adult)       Row Name 07/09/24 8007          Triage Assessment    Airway WDL WDL        Respiratory WDL    Respiratory WDL WDL        Skin Circulation/Temperature WDL    Skin Circulation/Temperature WDL WDL        Cardiac WDL    Cardiac WDL WDL        Peripheral/Neurovascular WDL    Peripheral Neurovascular WDL WDL        Cognitive/Neuro/Behavioral WDL    Cognitive/Neuro/Behavioral WDL WDL

## 2024-07-10 NOTE — H&P
[facility]    History and Physical - Transplant Surgery Service       Date of Admission:  7/9/2024   on * No surgery found *  Assessment & Plan: Surgery   Abiola Matute is a 59 year old female with history of osteopenia, LE cellulitis, Crohn's disease on Infliximab, obesity, cirrhosis secondary to DUNN/EtOH abuse complicated by HCC and ascites s/p DCD liver transplant w/ biliary stent placement on 6/22/24 with Dr. Ball presenting to [facility] ED after witnessed grand mal seizure at home 7/9/24.   -- Admit to transplant surgery   -- Neurology consulted, appreciate recommendations   -- Follow up MRI Brain   -- Keppra load  -- Follow up infectious workup  -- Relevant PTA meds resumed     The patient's care was discussed with the fellow who discussed with staff    Nahid Arora MD  [facility]  All communications related to this note should be expressed to resident/DINESH on call for this team at the time of your communication.  ______________________________________________________________________    Chief Complaint   Grand mal seizure    History is obtained from the patient    History of Present Illness   Abiola Matute is a 59 year old female with history of osteopenia, LE cellulitis, Crohn's disease on Infliximab, obesity, cirrhosis secondary to DUNN/EtOH abuse complicated by HCC and ascites s/p DCD liver transplant w/ biliary stent placement on 6/22/24 with Dr. Ball. Patient discharged on 6/28 and subsequently presented back to [facility] ED 6/30 in septic shock with WBC of 26 and pH of 7.03 and was admitted to the SICU for further workup and diagnostic studies. Infectious workup largely negative, antibiotics were stopped 7/3/24 and she was discharged 7/6/24.     Patient presenting to [facility] ED today 7/9/24 after witnessed grand mal seizure at home. Her  stated that they were watching TV when her finger  started to twist which was then accompanied by her neck turning to the left, her falling off the cough and continuing to convulse on the floor at approximately 8:30 PM which resolved on its own. She was reportedly post-ictal and then 911 was called and she was brought to [facility] ED for further evaluation and had additional seizure that presented with similar movements around 11:00 PM which resolved after IV benzo administration. Per , since recent discharge denies fevers, chills, abdominal pain, nausea, vomiting, sick contacts, or signs and symptoms of infection. Denies prior seizures nor prior neurological disturbances.     Laboratory workup showing Alk Phos 139, AST 24, ALT 28, Tbili 0.8, CK 49, CRP 60, Ammonia 16, lactic of 1.5, lactate dehydrogenase of 272, lipase of 44, Troponin of 21, WBC of 11.0, hemoglobin of 8.0, platelet count of 347, INR of 1.02, and negative UA. CT head obtained showing no acute intracranial process, neurology consulted for seizure workup.       Past Medical History    Past Medical History:   Diagnosis Date    Alcohol abuse     Alcoholic cirrhosis of liver with ascites     Anal fistula     Atypical ductal hyperplasia of breast 09/10/2010    ERT not recommended -left - and flat epithelial atypia-scheduled for breast biopsy 9/17/2010     Bifid uvula     Cholelithiasis     Contact perianal dermatitis and other eczema     recurrent - clobetasol     Crohn disease     Fear of flying      - gets Ativan prn.     HCC (hepatocellular carcinoma) (H) 2/1/2023    Hypertension     IBS (irritable bowel syndrome)        Past Surgical History   Past Surgical History:   Procedure Laterality Date    BENCH LIVER  6/22/2024    Procedure: Bench liver;  Surgeon: Pravin Ball MD;  Location: [facility] OR    BIOPSY ANAL N/A 3/28/2019    anal biopsy and culure placement of seton - Dr Fleming    BIOPSY BREAST Left 09/17/2010    - scheduled with Dr. Varma     BIOPSY LIVER  2019    COLONOSCOPY  2006     COLONOSCOPY N/A 12/23/2014    Procedure: COMBINED COLONOSCOPY, SINGLE OR MULTIPLE BIOPSY/POLYPECTOMY BY BIOPSY;  Surgeon: Diane Fleming MD;  Location:  GI    COLONOSCOPY N/A 12/5/2019    Procedure: COLONOSCOPY, WITH POLYPECTOMY AND BIOPSY;  Surgeon: Farhan Schilling MD;  Location: [facility] GI    COLONOSCOPY N/A 2/27/2024    Procedure: COLONOSCOPY, WITH POLYPECTOMY AND BIOPSY;  Surgeon: Michelle Diaz MD;  [place] [facility] [place]    ENDOSCOPIC RETROGRADE CHOLANGIOPANCREATOGRAM N/A 4/24/2020    Procedure: ENDOSCOPIC RETROGRADE CHOLANGIOPANCREATOGRAPHY WITH, sledge removal,sphincterotomy, stent in gallbladder and pancreatic duct stent, and balloon dilation;  Surgeon: Guru Isac Kraft MD;  Location: [facility] OR    ESOPHAGOSCOPY, GASTROSCOPY, DUODENOSCOPY (EGD), COMBINED N/A 12/5/2019    Procedure: ESOPHAGOGASTRODUODENOSCOPY (EGD);  Surgeon: Farhan Schilling MD;  Location: [facility] GI    ESOPHAGOSCOPY, GASTROSCOPY, DUODENOSCOPY (EGD), COMBINED N/A 5/11/2023    Procedure: Esophagoscopy, gastroscopy, duodenoscopy (EGD), combined;  Surgeon: Jeannette Cervantes MD;  Location: [facility] GI    EXAM UNDER ANESTHESIA ANUS N/A 3/28/2019    Procedure: EXAM UNDER ANESTHESIA ANUS;  Surgeon: Diane Fleming MD;  Location:  OR    EXAM UNDER ANESTHESIA ANUS N/A 2/26/2021    Procedure: EXAM UNDER ANESTHESIA OF ANUS, SETON PLACEMENT, EXCISION OF SKIN BRIDGE;  Surgeon: Avtar Nam MD;  [place] [facility] [place]    EXAM UNDER ANESTHESIA ANUS N/A 1/6/2023    Procedure: EXAM UNDER ANESTHESIA, ANUS, SETON EXCHANGE, partial fistulotomy;  Surgeon: Mary Dugan MD;  [place] [facility] [place]    HYSTERECTOMY, VAGINAL  2006    with Dr. Licha Zhou - with BSO for fibroids     IR GALLBLADDER DRAIN PLACEMENT  4/22/2020    IR SIRT (SELECTIVE INTERNAL RADIO THERAPY)  2/21/2023    IR VISCERAL ANGIOGRAM  2/21/2023    IR VISCERAL EMBOLIZATION  2/27/2023    LAPAROSCOPIC ABLATION LIVER TUMOR N/A 8/29/2023    Procedure: Diagnostic  Laparoscopy, Laparoscopic microwave ablation of liver tumor intraoperative ultrasound of liver, lysis of adhesions 1 hour,;  Surgeon: Albino Saavedra MD;  Location: [facility] OR    OPEN REDUCTION INTERNAL FIXATION ANKLE Left at age 27    plates and screws removed at age 36    Pelviscopy with removal of bilateral hydrosalpinges.  04/15/2010    TRANSPLANT LIVER RECIPIENT  DONOR N/A 2024    Procedure: Transplant liver recipient  donor, with biliary stent placement;  Surgeon: Pravin Ball MD;  Location: [facility] OR    ZZC APPENDECTOMY  at age 14       Prior to Admission Medications   Prior to Admission Medications   Prescriptions Last Dose Informant Patient Reported? Taking?   acetaminophen (TYLENOL) 325 MG tablet   No No   Sig: Take 2 tablets (650 mg) by mouth every 6 hours as needed for mild pain   albuterol (PROVENTIL) (2.5 MG/3ML) 0.083% neb solution   No No   Sig: Take 1 vial (2.5 mg) by nebulization every 28 days   aspirin (ASA) 81 MG chewable tablet   No No   Si tablet (81 mg) by Oral or Feeding Tube route daily   emollient (VANICREAM) external cream   No No   Sig: Apply topically every 6 hours as needed for other (Dryness)   furosemide (LASIX) 40 MG tablet   No No   Sig: Take 2 tablets (80 mg) by mouth daily as needed (edema. Hold if weight < or equal to 187lb (85 kg))   magnesium oxide (MAG-OX) 400 MG tablet   No No   Sig: Take 2 tablets (800 mg) by mouth 2 times daily   mycophenolate (GENERIC EQUIVALENT) 250 MG capsule   No No   Sig: Take 3 capsules (750 mg) by mouth 2 times daily   pantoprazole (PROTONIX) 40 MG EC tablet   No No   Sig: Take 1 tablet (40 mg) by mouth daily   pentamidine (NEBUPENT) 300 MG neb solution   No No   Sig: Inhale 300 mg into the lungs every 28 days   polyethylene glycol (MIRALAX) 17 GM/Dose powder   No No   Sig: Take 17 g by mouth 2 times daily as needed for constipation   Patient not taking: Reported on 2024   predniSONE (DELTASONE) 5 MG tablet   No No    Si tablet (5 mg) by Oral or Feeding Tube route daily   psyllium (METAMUCIL) WAFR   No No   Sig: Take 2 Wafers by mouth daily   tacrolimus (GENERIC EQUIVALENT) 0.5 MG capsule   No No   Sig: Take 1 capsule (0.5 mg) by mouth 2 times daily Total discharge dose = 4.5 mg BID   tacrolimus (GENERIC EQUIVALENT) 1 MG capsule   No No   Sig: Take 4 capsules (4 mg) by mouth 2 times daily Total discharge dose = 4.5 mg BID   traZODone (DESYREL) 50 MG tablet   No No   Sig: Take 0.5-1 tablets (25-50 mg) by mouth nightly as needed for sleep   Patient not taking: Reported on 2024   ursodiol (ACTIGALL) 300 MG capsule   No No   Si capsule (300 mg) by Oral or Feeding Tube route 3 times daily   valGANciclovir (VALCYTE) 450 MG tablet   No No   Sig: Take 1 tablet (450 mg) by mouth daily      Facility-Administered Medications: None        Review of Systems    The 10 point Review of Systems is negative other than noted in the HPI or here.      Physical Exam   Vital Signs: Temp: 97.9  F (36.6  C) Temp src: Oral BP: 139/79     Resp: 20 SpO2: 92 %      Weight: 0 lbs 0 ozNo intake or output data in the 24 hours ending 24 2303     Gen: drowsy, post-ictal, able to state name, place, and year after sternal rub  Chest: breathing non-labored on room air     tachycardic to low 100s, regular rhythm by radial pulse and on monitor  Abdomen: Soft, Non-tender, nondistended , expected surgical incisions in place, chevron incision closed with staples, skin desquamation   Extremities: Warm well perfused and 3+ BLE         Data     I have personally reviewed the following data over the past 24 hrs:    11.0  \   8.0 (L)   / 347     135 99 12.7 /  141 (H)   3.6 24 0.60 \     ALT: 28 AST: 24 AP: 139 TBILI: 0.8   ALB: 3.4 (L) TOT PROTEIN: 7.1 LIPASE: 44     Trop: 21 (H) BNP: N/A     TSH: 3.20 T4: N/A A1C: N/A     Procal: 0.23 CRP: 60.50 (H) Lactic Acid: 1.5       INR:  1.02 PTT:  27   D-dimer:  N/A Fibrinogen:  N/A     Ferritin:  N/A % Retic:   N/A LDH:  272 (H)       Imaging results reviewed over the past 24 hrs:   Recent Results (from the past 24 hour(s))   Head CT w/o contrast    Narrative    EXAM: CT HEAD W/O CONTRAST  LOCATION: [facility]  DATE: 7/9/2024    INDICATION: Recent liver txp, first time seizure  COMPARISON: None.  TECHNIQUE: Routine CT Head without IV contrast. Multiplanar reformats. Dose reduction techniques were used.    FINDINGS:  INTRACRANIAL CONTENTS: No intracranial hemorrhage, extraaxial collection, or mass effect.  No CT evidence of acute infarct. Normal parenchymal attenuation. Normal ventricles and sulci.     VISUALIZED ORBITS/SINUSES/MASTOIDS: No intraorbital abnormality. No significant paranasal sinus mucosal disease. No middle ear or mastoid effusion.    BONES/SOFT TISSUES:  Mild frontal scalp swelling. No large scalp hematoma. No skull fracture.      Impression    IMPRESSION:  1.  No acute intracranial process.   I have reviewed history, examined patient and discussed plan with the fellow/resident/DINESH.    I concur with the findings in this note.    Time spent on admission activities: 45 minutes.

## 2024-07-10 NOTE — ED PROVIDER NOTES
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     Patient had developed a seizure today. Her  states that they were watching television today. He states that she was sitting on a recliner and her finger started to twist and she was turning her neck to the left. She had gotten off the couch and she continued to have seizures on the floor. Her eyes had rolled back and she was able to focus later on.  Patient was not coherent for a few minutes after the seizure had stopped. 911 was called and emergency personnel's came. They had put a neck collar on her. Patient had a liver transplant on 6/22/24. Patient has skin changes especially on her abdomen. Patient was put on IV fluids and she had gained a few pounds, so she was put on IV furosemide. There were minor changes to medications on Monday such as magnesium oxide. She took her evening doses of medications. Denies tenderness in the middle of her posterior aspect of neck. Denies any new abdominal pain. Denies previous history of stroke. Denies chills or fevers. Denies infectious symptoms.      Per Chart Review,  Patient was admitted on 6/30/24 to 7/6/24 for fevers and a new patchy induration to extremities and flanks    Past Medical History  Past Medical History:   Diagnosis Date    Alcohol abuse     Alcoholic cirrhosis of liver with ascites     Anal fistula     Atypical ductal hyperplasia of breast 09/10/2010    ERT not recommended -left - and flat epithelial atypia-scheduled for breast biopsy 9/17/2010     Bifid  uvula     Cholelithiasis     Contact perianal dermatitis and other eczema     recurrent - clobetasol     Crohn disease     Fear of flying      - gets Ativan prn.     HCC (hepatocellular carcinoma) (H) 2/1/2023    Hypertension     IBS (irritable bowel syndrome)      Past Surgical History:   Procedure Laterality Date    BENCH LIVER  6/22/2024    Procedure: Bench liver;  Surgeon: Pravin Ball MD;  Location: [facility] OR    BIOPSY ANAL N/A 3/28/2019    anal biopsy and culure placement of seton - Dr Fleming    BIOPSY BREAST Left 09/17/2010    - scheduled with Dr. Varma     BIOPSY LIVER  2019    COLONOSCOPY  2006    COLONOSCOPY N/A 12/23/2014    Procedure: COMBINED COLONOSCOPY, SINGLE OR MULTIPLE BIOPSY/POLYPECTOMY BY BIOPSY;  Surgeon: Diane Fleming MD;  Location:  GI    COLONOSCOPY N/A 12/5/2019    Procedure: COLONOSCOPY, WITH POLYPECTOMY AND BIOPSY;  Surgeon: Farhan Schilling MD;  Location: [facility] GI    COLONOSCOPY N/A 2/27/2024    Procedure: COLONOSCOPY, WITH POLYPECTOMY AND BIOPSY;  Surgeon: Michelle Diaz MD;  Location: [facility] OR    ENDOSCOPIC RETROGRADE CHOLANGIOPANCREATOGRAM N/A 4/24/2020    Procedure: ENDOSCOPIC RETROGRADE CHOLANGIOPANCREATOGRAPHY WITH, sledge removal,sphincterotomy, stent in gallbladder and pancreatic duct stent, and balloon dilation;  Surgeon: Guru Isac Kraft MD;  Location: [facility] OR    ESOPHAGOSCOPY, GASTROSCOPY, DUODENOSCOPY (EGD), COMBINED N/A 12/5/2019    Procedure: ESOPHAGOGASTRODUODENOSCOPY (EGD);  Surgeon: Farhan Schilling MD;  Location: [facility] GI    ESOPHAGOSCOPY, GASTROSCOPY, DUODENOSCOPY (EGD), COMBINED N/A 5/11/2023    Procedure: Esophagoscopy, gastroscopy, duodenoscopy (EGD), combined;  Surgeon: Jeannette Cervantes MD;  Location: [facility] GI    EXAM UNDER ANESTHESIA ANUS N/A 3/28/2019    Procedure: EXAM UNDER ANESTHESIA ANUS;  Surgeon: Diane Fleming MD;  Location:  OR    EXAM UNDER ANESTHESIA ANUS N/A 2/26/2021    Procedure: EXAM UNDER  ANESTHESIA OF ANUS, SETON PLACEMENT, EXCISION OF SKIN BRIDGE;  Surgeon: Avtar Nam MD;  Location: [facility] OR    EXAM UNDER ANESTHESIA ANUS N/A 2023    Procedure: EXAM UNDER ANESTHESIA, ANUS, SETON EXCHANGE, partial fistulotomy;  Surgeon: Mary Dugan MD;  Location: [facility] OR    HYSTERECTOMY, VAGINAL  2006    with Dr. Licha Zhou - with BSO for fibroids     IR GALLBLADDER DRAIN PLACEMENT  2020    IR SIRT (SELECTIVE INTERNAL RADIO THERAPY)  2023    IR VISCERAL ANGIOGRAM  2023    IR VISCERAL EMBOLIZATION  2023    LAPAROSCOPIC ABLATION LIVER TUMOR N/A 2023    Procedure: Diagnostic Laparoscopy, Laparoscopic microwave ablation of liver tumor intraoperative ultrasound of liver, lysis of adhesions 1 hour,;  Surgeon: Albino Saavedra MD;  Location: [facility] OR    OPEN REDUCTION INTERNAL FIXATION ANKLE Left at age 27    plates and screws removed at age 36    Pelviscopy with removal of bilateral hydrosalpinges.  04/15/2010    TRANSPLANT LIVER RECIPIENT  DONOR N/A 2024    Procedure: Transplant liver recipient  donor, with biliary stent placement;  Surgeon: Pravin Ball MD;  Location: [facility] OR    [facility] APPENDECTOMY  at age 14     acetaminophen (TYLENOL) 325 MG tablet  [START ON 2024] albuterol (PROVENTIL) (2.5 MG/3ML) 0.083% neb solution  aspirin (ASA) 81 MG chewable tablet  emollient (VANICREAM) external cream  furosemide (LASIX) 40 MG tablet  magnesium oxide (MAG-OX) 400 MG tablet  mycophenolate (GENERIC EQUIVALENT) 250 MG capsule  pantoprazole (PROTONIX) 40 MG EC tablet  [START ON 2024] pentamidine (NEBUPENT) 300 MG neb solution  polyethylene glycol (MIRALAX) 17 GM/Dose powder  predniSONE (DELTASONE) 5 MG tablet  psyllium (METAMUCIL) WAFR  tacrolimus (GENERIC EQUIVALENT) 0.5 MG capsule  tacrolimus (GENERIC EQUIVALENT) 1 MG capsule  traZODone (DESYREL) 50 MG tablet  ursodiol (ACTIGALL) 300 MG capsule  valGANciclovir (VALCYTE) 450 MG tablet      Allergies    Allergen Reactions    Blood Transfusion Related (Informational Only) Other (See Comments)     Patient has a history of a clinically significant antibody against RBC antigens.  Anti Jka identified at [facility] on 7/4/2024. A delay in compatible RBCs may occur.    Fish Oil      Redness and itching around eye area only - went away when fish oil capsules stopped     Metronidazole      pain/itching    Ppd [Tuberculin Purified Protein Derivative]     Sulfa Antibiotics      hives    Terbinafine And Related      Lamisil = mild urticarial reaction     Family History  Family History   Problem Relation Age of Onset    Breast Cancer Mother     Gastrointestinal Disease Mother     Heart Disease Father 57    Hypertension Maternal Grandmother     Substance Abuse Maternal Grandfather     Diabetes Maternal Grandfather     Diabetes Paternal Grandmother     Heart Disease Paternal Grandfather     Cancer Sister     Skin Cancer Sister     Substance Abuse Maternal Uncle     Substance Abuse Maternal Uncle     Suicide Niece     Suicide Niece     Anesthesia Reaction No family hx of     Thrombosis No family hx of      Social History   Social History     Tobacco Use    Smoking status: Never     Passive exposure: Never    Smokeless tobacco: Never   Vaping Use    Vaping status: Never Used   Substance Use Topics    Alcohol use: Not Currently    Drug use: No      Past medical history, past surgical history, medications, allergies, family history, and social history were reviewed with the patient. No additional pertinent items.     A complete review of systems was performed with pertinent positives and negatives noted in the HPI, and all other systems negative.    Physical Exam   BP: 139/79  Temp: 97.9  F (36.6  C)  Resp: 20  SpO2: 92 %  Physical Exam  Vital Signs Reviewed  Gen: Well nourished, well developed, resting comfortably, no acute distress  HEENT: NC/AT, PERRL, EOMI, MMM.  Tongue bite injury  Neck: Supple, FROM.  No midline tenderness  step-off or deformity.   CV: Regular Rate, no murmur/rub/gallop  Lungs/Chest: Normal Effort, CTAB  Abd: Obese, soft, nontender.  Well-healing surgical scars  MSK/Back: FROM, no visible deformity  Neuro: A&Ox3, GCS 15, CN II-XII unremarkable.  5/5 strength proximal distal upper and lower extremities, symmetric.  Sensation intact.  Gait assessment deferred.  Skin: Warm, Dry, Intact, no visible lesions    ED Course, Procedures, & Data      Procedures            EKG Interpretation:      Interpreted by Matthew Abdi MD  Time reviewed: 2210  Symptoms at time of EKG: Sinus tachycardia, seizure   Rhythm: sinus tachycardia  Rate: 110-120  Axis: Normal  Ectopy: none  Conduction: normal  ST Segments/ T Waves: No ST-T wave changes and No acute ischemic changes  Q Waves: none  Comparison to prior: Interval tachycardia    Clinical Impression: sinus tachycardia                 Results for orders placed or performed during the hospital encounter of 07/09/24   Head CT w/o contrast     Status: None    Narrative    EXAM: CT HEAD W/O CONTRAST  LOCATION: [facility]  DATE: 7/9/2024    INDICATION: Recent liver txp, first time seizure  COMPARISON: None.  TECHNIQUE: Routine CT Head without IV contrast. Multiplanar reformats. Dose reduction techniques were used.    FINDINGS:  INTRACRANIAL CONTENTS: No intracranial hemorrhage, extraaxial collection, or mass effect.  No CT evidence of acute infarct. Normal parenchymal attenuation. Normal ventricles and sulci.     VISUALIZED ORBITS/SINUSES/MASTOIDS: No intraorbital abnormality. No significant paranasal sinus mucosal disease. No middle ear or mastoid effusion.    BONES/SOFT TISSUES:  Mild frontal scalp swelling. No large scalp hematoma. No skull fracture.      Impression    IMPRESSION:  1.  No acute intracranial process.   [place] Draw     Status: None (In process)    Narrative    The following orders were created for panel order [place]  Draw.  Procedure                               Abnormality         Status                     ---------                               -----------         ------                     Extra Blue Top Tube[[id number]]                                                         Extra Red Top Tube[[id number]]                               Final result               Extra Green Top (Lithium...[[id number]]                                                 Extra Purple Top Tube[[id number]]                                                         Please view results for these tests on the individual orders.   Extra Red Top Tube     Status: None   Result Value Ref Range    Hold Specimen JI    INR     Status: Normal   Result Value Ref Range    INR 1.02 0.85 - 1.15   Partial thromboplastin time     Status: Normal   Result Value Ref Range    aPTT 27 22 - 38 Seconds   Basic metabolic panel     Status: Abnormal   Result Value Ref Range    Sodium 135 135 - 145 mmol/L    Potassium 3.6 3.4 - 5.3 mmol/L    Chloride 99 98 - 107 mmol/L    Carbon Dioxide (CO2) 24 22 - 29 mmol/L    Anion Gap 12 7 - 15 mmol/L    Urea Nitrogen 12.7 8.0 - 23.0 mg/dL    Creatinine 0.60 0.51 - 0.95 mg/dL    GFR Estimate >90 >60 mL/min/1.73m2    Calcium 8.0 (L) 8.8 - 10.2 mg/dL    Glucose 141 (H) 70 - 99 mg/dL   Hepatic function panel     Status: Abnormal   Result Value Ref Range    Protein Total 7.1 6.4 - 8.3 g/dL    Albumin 3.4 (L) 3.5 - 5.2 g/dL    Bilirubin Total 0.8 <=1.2 mg/dL    Alkaline Phosphatase 139 40 - 150 U/L    AST 24 0 - 45 U/L    ALT 28 0 - 50 U/L    Bilirubin Direct 0.33 (H) 0.00 - 0.30 mg/dL   Procalcitonin     Status: Normal   Result Value Ref Range    Procalcitonin 0.23 <0.50 ng/mL   CRP inflammation     Status: Abnormal   Result Value Ref Range    CRP Inflammation 60.50 (H) <5.00 mg/L   Erythrocyte sedimentation rate auto     Status: Abnormal   Result Value Ref Range    Erythrocyte Sedimentation Rate 95 (H) 0 - 30 mm/hr   Magnesium     Status:  Abnormal   Result Value Ref Range    Magnesium 1.4 (L) 1.7 - 2.3 mg/dL   UA with Microscopic reflex to Culture     Status: Abnormal    Specimen: Urine, Midstream   Result Value Ref Range    Color Urine Yellow Colorless, Straw, Light Yellow, Yellow    Appearance Urine Slightly Cloudy (A) Clear    Glucose Urine Negative Negative mg/dL    Bilirubin Urine Negative Negative    Ketones Urine Negative Negative mg/dL    Specific Gravity Urine 1.010 1.003 - 1.035    Blood Urine Negative Negative    pH Urine 8.0 (H) 5.0 - 7.0    Protein Albumin Urine Negative Negative mg/dL    Urobilinogen Urine Normal Normal, 2.0 mg/dL    Nitrite Urine Negative Negative    Leukocyte Esterase Urine Small (A) Negative    Bacteria Urine Moderate (A) None Seen /HPF    RBC Urine 3 (H) <=2 /HPF    WBC Urine 4 <=5 /HPF    Squamous Epithelials Urine 1 <=1 /HPF    Narrative    Urine Culture not indicated   Blood gas venous     Status: Abnormal   Result Value Ref Range    pH Venous 7.44 (H) 7.32 - 7.43    pCO2 Venous 42 40 - 50 mm Hg    pO2 Venous 30 25 - 47 mm Hg    Bicarbonate Venous 28 21 - 28 mmol/L    Base Excess/Deficit Venous 3.6 (H) -3.0 - 3.0 mmol/L    FIO2 0     Oxyhemoglobin Venous 52 (L) 70 - 75 %    O2 Sat, Venous 53.2 (L) 70.0 - 75.0 %    Narrative    In healthy individuals, oxyhemoglobin (O2Hb) and oxygen saturation (SO2) are approximately equal. In the presence of dyshemoglobins, oxyhemoglobin can be considerably lower than oxygen saturation.   TSH with free T4 reflex     Status: Normal   Result Value Ref Range    TSH 3.20 0.30 - 4.20 uIU/mL   Lactic acid whole blood with 1x repeat in 2 hr when >2     Status: Normal   Result Value Ref Range    Lactic Acid, Initial 1.5 0.7 - 2.0 mmol/L   Lipase     Status: Normal   Result Value Ref Range    Lipase 44 13 - 60 U/L   Lactate Dehydrogenase     Status: Abnormal   Result Value Ref Range    Lactate Dehydrogenase 272 (H) 0 - 250 U/L   CK total     Status: Normal   Result Value Ref Range    CK  49 26 - 192 U/L   Troponin T, High Sensitivity     Status: Abnormal   Result Value Ref Range    Troponin T, High Sensitivity 21 (H) <=14 ng/L   Ammonia     Status: Normal   Result Value Ref Range    Ammonia 16 11 - 51 umol/L   Phosphorus     Status: Normal   Result Value Ref Range    Phosphorus 2.6 2.5 - 4.5 mg/dL   CBC with platelets and differential     Status: Abnormal   Result Value Ref Range    WBC Count 11.0 4.0 - 11.0 10e3/uL    RBC Count 2.41 (L) 3.80 - 5.20 10e6/uL    Hemoglobin 8.0 (L) 11.7 - 15.7 g/dL    Hematocrit 24.4 (L) 35.0 - 47.0 %     (H) 78 - 100 fL    MCH 33.2 (H) 26.5 - 33.0 pg    MCHC 32.8 31.5 - 36.5 g/dL    RDW 21.1 (H) 10.0 - 15.0 %    Platelet Count 347 150 - 450 10e3/uL    % Neutrophils 76 %    % Lymphocytes 8 %    % Monocytes 6 %    % Eosinophils 7 %    % Basophils 1 %    % Immature Granulocytes 2 %    NRBCs per 100 WBC 0 <1 /100    Absolute Neutrophils 8.4 (H) 1.6 - 8.3 10e3/uL    Absolute Lymphocytes 0.9 0.8 - 5.3 10e3/uL    Absolute Monocytes 0.7 0.0 - 1.3 10e3/uL    Absolute Eosinophils 0.8 (H) 0.0 - 0.7 10e3/uL    Absolute Basophils 0.1 0.0 - 0.2 10e3/uL    Absolute Immature Granulocytes 0.2 <=0.4 10e3/uL    Absolute NRBCs 0.0 10e3/uL   EKG 12-lead, tracing only     Status: None   Result Value Ref Range    Systolic Blood Pressure  mmHg    Diastolic Blood Pressure  mmHg    Ventricular Rate 115 BPM    Atrial Rate 115 BPM    TX Interval 162 ms    QRS Duration 76 ms     ms    QTc 456 ms    P Axis 54 degrees    R AXIS 8 degrees    T Axis 37 degrees    Interpretation ECG       Sinus tachycardia  Otherwise normal ECG  Unconfirmed report - interpretation of this ECG is computer generated - see medical record for final interpretation  Confirmed by - EMERGENCY ROOM, PHYSICIAN ([id number]),  MOHAMUD CHAPMAN ([id number]) on 7/9/2024 10:37:20 PM     CBC with platelets differential     Status: Abnormal    Narrative    The following orders were created for panel order CBC with platelets  differential.  Procedure                               Abnormality         Status                     ---------                               -----------         ------                     CBC with platelets and d...[[id number]]  Abnormal            Final result                 Please view results for these tests on the individual orders.     Medications   magnesium sulfate 2 g in 50 mL sterile water intermittent infusion (2 g Intravenous $New Bag 7/10/24 0019)   levETIRAcetam (KEPPRA) tablet 750 mg (has no administration in time range)   lactated ringers BOLUS 1,000 mL (1,000 mLs Intravenous $New Bag 7/9/24 2251)   calcium carbonate (TUMS) chewable tablet 1,000 mg (1,000 mg Oral Not Given 7/10/24 0018)   LORazepam (ATIVAN) injection 2 mg (2 mg Intravenous $Given 7/9/24 2314)   levETIRAcetam (KEPPRA) injection for EMERGENT loading dose 4,500 mg (4,500 mg Intravenous $Given 7/10/24 0049)     Labs Ordered and Resulted from Time of ED Arrival to Time of ED Departure   BASIC METABOLIC PANEL - Abnormal       Result Value    Sodium 135      Potassium 3.6      Chloride 99      Carbon Dioxide (CO2) 24      Anion Gap 12      Urea Nitrogen 12.7      Creatinine 0.60      GFR Estimate >90      Calcium 8.0 (*)     Glucose 141 (*)    HEPATIC FUNCTION PANEL - Abnormal    Protein Total 7.1      Albumin 3.4 (*)     Bilirubin Total 0.8      Alkaline Phosphatase 139      AST 24      ALT 28      Bilirubin Direct 0.33 (*)    CRP INFLAMMATION - Abnormal    CRP Inflammation 60.50 (*)    ERYTHROCYTE SEDIMENTATION RATE AUTO - Abnormal    Erythrocyte Sedimentation Rate 95 (*)    MAGNESIUM - Abnormal    Magnesium 1.4 (*)    ROUTINE UA WITH MICROSCOPIC REFLEX TO CULTURE - Abnormal    Color Urine Yellow      Appearance Urine Slightly Cloudy (*)     Glucose Urine Negative      Bilirubin Urine Negative      Ketones Urine Negative      Specific Gravity Urine 1.010      Blood Urine Negative      pH Urine 8.0 (*)     Protein Albumin Urine Negative       Urobilinogen Urine Normal      Nitrite Urine Negative      Leukocyte Esterase Urine Small (*)     Bacteria Urine Moderate (*)     RBC Urine 3 (*)     WBC Urine 4      Squamous Epithelials Urine 1     BLOOD GAS VENOUS - Abnormal    pH Venous 7.44 (*)     pCO2 Venous 42      pO2 Venous 30      Bicarbonate Venous 28      Base Excess/Deficit Venous 3.6 (*)     FIO2 0      Oxyhemoglobin Venous 52 (*)     O2 Sat, Venous 53.2 (*)    LACTATE DEHYDROGENASE - Abnormal    Lactate Dehydrogenase 272 (*)    TROPONIN T, HIGH SENSITIVITY - Abnormal    Troponin T, High Sensitivity 21 (*)    CBC WITH PLATELETS AND DIFFERENTIAL - Abnormal    WBC Count 11.0      RBC Count 2.41 (*)     Hemoglobin 8.0 (*)     Hematocrit 24.4 (*)      (*)     MCH 33.2 (*)     MCHC 32.8      RDW 21.1 (*)     Platelet Count 347      % Neutrophils 76      % Lymphocytes 8      % Monocytes 6      % Eosinophils 7      % Basophils 1      % Immature Granulocytes 2      NRBCs per 100 WBC 0      Absolute Neutrophils 8.4 (*)     Absolute Lymphocytes 0.9      Absolute Monocytes 0.7      Absolute Eosinophils 0.8 (*)     Absolute Basophils 0.1      Absolute Immature Granulocytes 0.2      Absolute NRBCs 0.0     INR - Normal    INR 1.02     PARTIAL THROMBOPLASTIN TIME - Normal    aPTT 27     PROCALCITONIN - Normal    Procalcitonin 0.23     TSH WITH FREE T4 REFLEX - Normal    TSH 3.20     LACTIC ACID WHOLE BLOOD WITH 1X REPEAT IN 2 HR WHEN >2 - Normal    Lactic Acid, Initial 1.5     LIPASE - Normal    Lipase 44     CK TOTAL - Normal    CK 49     AMMONIA - Normal    Ammonia 16     PHOSPHORUS - Normal    Phosphorus 2.6     [place]ANS[place] VIRUS QUANTITATIVE PCR, PLASMA   CMV QUANTITATIVE, PCR   BLOOD CULTURE   BLOOD CULTURE     Head CT w/o contrast   Final Result   IMPRESSION:   1.  No acute intracranial process.      MR Brain w/o & w Contrast    (Results Pending)          Critical Care Addendum  My initial assessment, based on my review of prehospital provider  report, review of nursing observations, review of vital signs, focused history, and physical exam, established a high suspicion that Abiola Matute has altered mental status and seizures , which requires immediate intervention, and therefore she is critically ill.     After the initial assessment, the care team initiated multiple lab tests, initiated IV fluid administration, initiated medication therapy with IV ativan, IV keppra load, and consulted with Neurology, Transplant Surgery  to provide stabilization care. Due to the critical nature of this patient, I reassessed nursing observations, vital signs, physical exam, mental status, neurologic status, and respiratory status multiple times prior to her disposition.     Time also spent performing documentation, discussion with family to obtain medical information for decision making, reviewing test results, discussion with consultants, and coordination of care.     Critical care time (excluding teaching time and procedures): 35 minutes.       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  On arrival patient has a mild tachycardia, is afebrile breathing easily on room air in no acute distress.  C-collar was placed by EMS and her cervical spine was clinically cleared.  She was alert oriented and back to her neurologic baseline upon arrival to the emergency department.  Her neurologic exam was nonfocal and reassuring.  A broad array of laboratory tests and a head CT were ordered to evaluate for possible inciting factors of the seizure.  Her initial labs were reassuring, mild hypo-[place] EMEA and hypocalcemia were noted.  Ammonia was reassuring.  She did not  have significant leukocytosis.  She has a stable anemia.  Glucose and sodium are reassuring.  Urinalysis does not appear consistent with acute infection.  General neurology was consulted for assistance in evaluating first-time seizure in the setting of recent liver transplant.    Contacted Dr. Gandhi of the transplant surgery service who agreed with initial workup, agreed with neurology consultation.  Patient be admitted to the transplant surgery service.  Level of care pending neurology recs.    The patient had a witnessed seizure in the emergency department.  Lasted several minutes.  There is another tongue biting episode and some hypoxia noted.  She received 2 mg of IV Ativan towards the tail end of the seizure which completely aborted it.  The patient was postictal and somnolent after this but did clear to the point where she would wake up respond to commands and answer questions.    Neurology recommends a 60 mg/kg Keppra load, patient given hard stop 4.5 g bolus.  Started on 750 mg of Keppra twice daily afterwards.    Initial CT scan of the head is negative for acute process.  Recommendations from neurology at this time include MRI brain without and with contrast, will defer lumbar puncture given no infectious prodromal symptoms. Based on patient's evaluation history and presentation lower suspicion for infection at this time and lumbar puncture will be deferred.  Should patient develop fever or other signs of infection this may need to be considered. Given habitus and current metal status s/p seizure and ativan it may be technically challenging. Neuro believes patient can be admitted to floor status at this time.    The ED pharmacist suggested that press remain in the differential given recent onset of new immunosuppression.  Will follow MRI with and without contrast as well.    Patient be admitted to transplant surgery service with neurology following for further evaluation of new seizures in the setting of  recent transplant.    New Prescriptions    No medications on file       Final diagnoses:   Seizure (H)   History of liver transplant (H)   Hypomagnesemia   Hypocalcemia   I, IBETH GILMORE, am serving as a trained medical scribe to document services personally performed by Matthew Abdi MD, based on the provider's statements to me.     IMatthew MD, was physically present and have reviewed and verified the accuracy of this note documented by IBETH GILMORE.     Matthew Abdi Jr., MD   [facility] EMERGENCY DEPARTMENT  7/9/2024     Matthew Abdi MD  07/10/24 0103

## 2024-07-10 NOTE — PLAN OF CARE
Goal Outcome Evaluation:    [id number]  /70   Temp 98  F (36.7  C) (Axillary)   Resp 20   LMP 05/31/2006   SpO2 94%     Pt A&O, has been sleeping after seizure activity, open eyes and respond to questions with gentle touch.   PIV, SL  NPO except for meds/ice chips  Abd clamshell, with staples, KRISTIAN  Purewick in place, no bm.   @ bedside for support  No acute change, MRI done, continue with poc

## 2024-07-11 ENCOUNTER — TELEPHONE (OUTPATIENT)
Dept: FAMILY MEDICINE | Facility: CLINIC | Age: 59
End: 2024-07-11

## 2024-07-11 ENCOUNTER — MEDICAL CORRESPONDENCE (OUTPATIENT)
Dept: HEALTH INFORMATION MANAGEMENT | Facility: CLINIC | Age: 59
End: 2024-07-11

## 2024-07-11 ENCOUNTER — APPOINTMENT (OUTPATIENT)
Dept: CT IMAGING | Facility: CLINIC | Age: 59
DRG: 100 | End: 2024-07-11
Attending: PHYSICIAN ASSISTANT
Payer: MEDICARE

## 2024-07-11 VITALS
OXYGEN SATURATION: 96 % | DIASTOLIC BLOOD PRESSURE: 75 MMHG | BODY MASS INDEX: 30.12 KG/M2 | HEART RATE: 80 BPM | WEIGHT: 181 LBS | TEMPERATURE: 98.5 F | RESPIRATION RATE: 16 BRPM | SYSTOLIC BLOOD PRESSURE: 130 MMHG

## 2024-07-11 LAB
ALBUMIN SERPL BCG-MCNC: 3.3 G/DL (ref 3.5–5.2)
ALP SERPL-CCNC: 136 U/L (ref 40–150)
ALT SERPL W P-5'-P-CCNC: 23 U/L (ref 0–50)
ANION GAP SERPL CALCULATED.3IONS-SCNC: 8 MMOL/L (ref 7–15)
AST SERPL W P-5'-P-CCNC: 16 U/L (ref 0–45)
BILIRUB DIRECT SERPL-MCNC: 0.3 MG/DL (ref 0–0.3)
BILIRUB SERPL-MCNC: 0.8 MG/DL
BUN SERPL-MCNC: 9.5 MG/DL (ref 8–23)
CALCIUM SERPL-MCNC: 8.4 MG/DL (ref 8.8–10.2)
CHLORIDE SERPL-SCNC: 101 MMOL/L (ref 98–107)
CREAT SERPL-MCNC: 0.59 MG/DL (ref 0.51–0.95)
DEPRECATED HCO3 PLAS-SCNC: 24 MMOL/L (ref 22–29)
EGFRCR SERPLBLD CKD-EPI 2021: >90 ML/MIN/1.73M2
ERYTHROCYTE [DISTWIDTH] IN BLOOD BY AUTOMATED COUNT: 20.5 % (ref 10–15)
GLUCOSE BLDC GLUCOMTR-MCNC: 105 MG/DL (ref 70–99)
GLUCOSE BLDC GLUCOMTR-MCNC: 117 MG/DL (ref 70–99)
GLUCOSE SERPL-MCNC: 118 MG/DL (ref 70–99)
HCT VFR BLD AUTO: 23.8 % (ref 35–47)
HGB BLD-MCNC: 7.6 G/DL (ref 11.7–15.7)
HOLD SPECIMEN: NORMAL
HSV1 DNA CSF QL NAA+PROBE: NOT DETECTED
HSV2 DNA CSF QL NAA+PROBE: NOT DETECTED
MAGNESIUM SERPL-MCNC: 1.8 MG/DL (ref 1.7–2.3)
MCH RBC QN AUTO: 33 PG (ref 26.5–33)
MCHC RBC AUTO-ENTMCNC: 31.9 G/DL (ref 31.5–36.5)
MCV RBC AUTO: 104 FL (ref 78–100)
PHOSPHATE SERPL-MCNC: 2.4 MG/DL (ref 2.5–4.5)
PLATELET # BLD AUTO: 374 10E3/UL (ref 150–450)
POTASSIUM SERPL-SCNC: 4.1 MMOL/L (ref 3.4–5.3)
PROT SERPL-MCNC: 6.8 G/DL (ref 6.4–8.3)
RBC # BLD AUTO: 2.3 10E6/UL (ref 3.8–5.2)
SODIUM SERPL-SCNC: 133 MMOL/L (ref 135–145)
TACROLIMUS BLD-MCNC: 6 UG/L (ref 5–15)
TME LAST DOSE: NORMAL H
TME LAST DOSE: NORMAL H
VZV DNA SPEC QL NAA+PROBE: NOT DETECTED
WBC # BLD AUTO: 10.2 10E3/UL (ref 4–11)

## 2024-07-11 PROCEDURE — G0463 HOSPITAL OUTPT CLINIC VISIT: HCPCS

## 2024-07-11 PROCEDURE — 250N000013 HC RX MED GY IP 250 OP 250 PS 637: Performed by: PHYSICIAN ASSISTANT

## 2024-07-11 PROCEDURE — 99233 SBSQ HOSP IP/OBS HIGH 50: CPT | Mod: GC | Performed by: STUDENT IN AN ORGANIZED HEALTH CARE EDUCATION/TRAINING PROGRAM

## 2024-07-11 PROCEDURE — 83735 ASSAY OF MAGNESIUM: CPT | Performed by: PHYSICIAN ASSISTANT

## 2024-07-11 PROCEDURE — 36415 COLL VENOUS BLD VENIPUNCTURE: CPT | Performed by: PHYSICIAN ASSISTANT

## 2024-07-11 PROCEDURE — 250N000012 HC RX MED GY IP 250 OP 636 PS 637: Performed by: PHYSICIAN ASSISTANT

## 2024-07-11 PROCEDURE — G1010 CDSM STANSON: HCPCS | Performed by: RADIOLOGY

## 2024-07-11 PROCEDURE — 70450 CT HEAD/BRAIN W/O DYE: CPT | Mod: MG

## 2024-07-11 PROCEDURE — 80053 COMPREHEN METABOLIC PANEL: CPT | Performed by: PHYSICIAN ASSISTANT

## 2024-07-11 PROCEDURE — 85027 COMPLETE CBC AUTOMATED: CPT | Performed by: PHYSICIAN ASSISTANT

## 2024-07-11 PROCEDURE — 80197 ASSAY OF TACROLIMUS: CPT | Performed by: PHYSICIAN ASSISTANT

## 2024-07-11 PROCEDURE — 84100 ASSAY OF PHOSPHORUS: CPT | Performed by: PHYSICIAN ASSISTANT

## 2024-07-11 PROCEDURE — 70450 CT HEAD/BRAIN W/O DYE: CPT | Mod: 26 | Performed by: RADIOLOGY

## 2024-07-11 RX ORDER — TACROLIMUS 0.5 MG/1
0.5 CAPSULE ORAL 2 TIMES DAILY
Qty: 60 CAPSULE | Refills: 11 | Status: ON HOLD | OUTPATIENT
Start: 2024-07-11 | End: 2024-07-16

## 2024-07-11 RX ORDER — LEVETIRACETAM 750 MG/1
750 TABLET ORAL 2 TIMES DAILY
Qty: 60 TABLET | Refills: 4 | Status: SHIPPED | OUTPATIENT
Start: 2024-07-11 | End: 2024-07-12

## 2024-07-11 RX ORDER — FUROSEMIDE 40 MG
40 TABLET ORAL DAILY PRN
Qty: 30 TABLET | Refills: 0 | Status: SHIPPED | OUTPATIENT
Start: 2024-07-11 | End: 2024-07-11

## 2024-07-11 RX ORDER — FUROSEMIDE 40 MG
40 TABLET ORAL DAILY PRN
Status: ON HOLD
Start: 2024-07-11 | End: 2024-07-16

## 2024-07-11 RX ORDER — TACROLIMUS 1 MG/1
5 CAPSULE ORAL 2 TIMES DAILY
Qty: 300 CAPSULE | Refills: 11 | Status: ON HOLD | OUTPATIENT
Start: 2024-07-11 | End: 2024-07-16

## 2024-07-11 RX ADMIN — URSODIOL 300 MG: 300 CAPSULE ORAL at 16:29

## 2024-07-11 RX ADMIN — URSODIOL 300 MG: 300 CAPSULE ORAL at 09:19

## 2024-07-11 RX ADMIN — MAGNESIUM OXIDE TAB 400 MG (241.3 MG ELEMENTAL MG) 800 MG: 400 (241.3 MG) TAB at 09:19

## 2024-07-11 RX ADMIN — TACROLIMUS 4.5 MG: 1 CAPSULE ORAL at 09:19

## 2024-07-11 RX ADMIN — PREDNISONE 5 MG: 5 TABLET ORAL at 09:19

## 2024-07-11 RX ADMIN — MYCOPHENOLATE MOFETIL 750 MG: 250 CAPSULE ORAL at 09:19

## 2024-07-11 RX ADMIN — LEVETIRACETAM 750 MG: 750 TABLET, FILM COATED ORAL at 09:19

## 2024-07-11 RX ADMIN — VALGANCICLOVIR HYDROCHLORIDE 450 MG: 450 TABLET ORAL at 09:19

## 2024-07-11 RX ADMIN — PANTOPRAZOLE SODIUM 40 MG: 40 TABLET, DELAYED RELEASE ORAL at 09:19

## 2024-07-11 ASSESSMENT — ACTIVITIES OF DAILY LIVING (ADL)
ADLS_ACUITY_SCORE: 40

## 2024-07-11 NOTE — PROGRESS NOTES
"[facility]  Neurology Consultation - Progress Note    Patient Name:  Abiola Matute  Date of Service:  July 11, 2024    Subjective:    No acute events overnight. No report of additional episodes concerning for seizure. 3 hour video EEG and LP were completed 7/10.    Objective:    Vitals: /72 (BP Location: Right arm)   Pulse 89   Temp 99.2  F (37.3  C) (Oral)   Resp 16   Wt 82.1 kg (181 lb)   LMP 05/31/2006   SpO2 96%   BMI 30.12 kg/m    General: laying in bed, no acute distress  Head: Atraumatic, normocephalic   Cardiac: no lower extremity edema  Neurologic:  Mental Status: awake, alert, oriented to person, place, month, year, and situation. Follows one step commands on both sides of body. Naming and repetition intact. Spells \"SUPER\" accurately forwards and backwards. Accurately calculates number of quarters in $2.25. Correctly names current and previous President.  Cranial Nerves: Visual fields full on confrontational testing, conjugate gaze with intact extraocular movements, upper and lower face symmetric at rest and with activation, tongue protrusion midline.  Motor: No abnormal movements.  Moving all 4 limbs antigravity. 5/5 strength bilaterally of shoulder abduction, elbow flexion, elbow extension, hip flexion, ankle dorsiflexion/plantarflexion.  Sensory: Intact to light touch x 4 limbs  Station/Gait: Not assessed.    Pertinent Investigations:    I have personally reviewed most recent and pertinent labs, tests, and radiological images.     CSF Studies 7/10/24  WBC: 0  Protein: 48.8  Glucose: 68 (relative to 104 in serum)  Gram Stain: No organisms  Aerobic Bacterial Culture: No growth to date  Meningitis/Encephalitis Panel: Negative  HSV 1 and 2: in process  VZV: in process    3 hour video EEG 7/10/24 - for full details, please see separate note from epileptologist Dr. Lorenzo  \"Impression: This is a normal waking and sleep EEG.\"    MRI Brain w/ and w/o contrast " "7/10/24  \"IMPRESSION:  1.  No recent infarct or intracranial mass.  2.  Trace subdural hemorrhage overlying the right occipital lobe without mass effect. There is mild pachymeningeal enhancement overlying the right convexity favored to reflect a reactive change.  3.  Nonspecific symmetric signal changes involving the corticospinal tracts which can be seen in the setting of chronic hepatic encephalopathy.\"    Assessment  Virginia Matute is a 59 year old right-handed female with history of Crohn's disease on infliximab and liver cirrhosis c/b HCC s/p liver transplant 6/22/2024 (current immunosuppression: mycophenolate, prednisone, and tacrolimus) who presents after two seizures on the evening of 7/9/24; semiology started with right arm automatism followed by right head turning and then generalized tonic-clonic. First episode spontaneously resolved at home after estimated 3-4 minutes, second episode in the ER was stopped with Ativan 2 mg.    MRI notable for trace subdural hemorrhage overlying right occipital lobe without mass effect, as well as mild pachymeningeal enhancement overlying the right convexity which likely represents reactive change.    Due to her immunocompromised status, fever of unknown origin that started 1 week prior to this presentation, and now new onset seizures, recommended LP to rule out infectious etiology/viral encephalitis. CSF studies from 7/10 are bland, with HSV and VZV PCR negative.    Ms. Matute is at her baseline with intact neurologic exam today (7/11) as documented above.    Would repeat CT Head w/o contrast to evaluate any evolution of subdural hemorrhage that was noted on MRI. If stable, reasonable to discharge from a neurology standpoint. Per [place] State Law, Ms. Matute should not drive for 3 months after seizure (date 7/9, so three month yesenia would be 10/9/24).    Recommendations:   - continue Keppra 750 mg BID  - CT Head w/o contrast today; if okay, then reasonable to discharge from " neurology standpoint  - epilepsy clinic referral (ordered for you in discharge navigator)    Thank you for involving Neurology in the care of Abiola Matute.  Please do not hesitate to call with questions/concerns (consult pager [phone]).      Patient was seen and discussed with Dr. Alonso.    Johan Valdez MD  Neurology Resident PGY-4

## 2024-07-11 NOTE — PROGRESS NOTES
Transplant Surgery  Inpatient Daily Progress Note  07/11/2024    Assessment & Plan: Abiola Matute is a 59 year old female with history of HCC in the setting of DUNN/EtOH abuse s/p DCD liver transplant w/ biliary stent placement on 6/22/24 with Dr. Ball, osteopenia, LE cellulitis, Crohn's disease on Infliximab, obesity. Patient discharged on 6/28 and subsequently presented back to [facility] ED 6/30 in septic shock and admitted to the SICU for further workup and diagnostic studies. Infectious workup largely negative, antibiotics were stopped 7/3/24 and she was discharged 7/6/24.     Admitted 7/9/24 after witnessed grand mal seizure at home with post ictal state. She was brought to [facility] ED for further evaluation and had additional seizure that presented with similar movements around 11:00 PM which resolved after IV benzo administration. Per , since recent discharge denies fevers, chills, abdominal pain, nausea, vomiting, sick contacts, or signs and symptoms of infection. Denies prior seizures nor prior neurological disturbances.     Graft function:LFTs stable  Immunosuppression management:    mg BID  Tac 4.5 BID. Goal 8-12. Level in process  Pred 5 mg daily  Neuro:   Seizure: Neurology consulted. Keppra loaded and keppra started overnight. Brain MRI reviewed by neurology. EEG monitoring stable. 7/10 CNS studies negative. Follow up CT scan head to re evaluate trans subdural hemorrhage right occipital lobe, if no change Neuro said patient can discharge. Follow up with Neuro outpatient (~3 months).  Hematology: No acute issues. Hb ~7-8. Transfuse for Hb < 7   Cardiorespiratory: Stable   GI/Nutrition: Regular diet as tolerated  Endocrine: No acute issues  Fluid/Electrolytes: No IVF.   Hypomagnesemia: PTA Mg 800 mg BID. Mg 2g IV on 7/10.   : No maxwell indicated  Infectious disease: Afebrile, WBC stable. 7/10 LP, CSF studies negative thus far for infection.  Prophylaxis: DVT (low risk), seizure,  fall  Disposition: 7A. Discharge later today. Follow up with Dr. Ball on 7/15/24.    DINESH/Fellow/Resident Provider: Kim Morel PA-C     Faculty: Haim Geronimo M.D.    __________________________________________________________________  Transplant History: Admitted 7/9/2024 for seizure.   The patient has a history of liver failure due to hepatocelluar carcinoma.    6/22/2024 (Liver), Postoperative day: 19     Interval History: History is obtained from the patient, electronic health record, and patient's family  Overnight events: Denies nausea. Patient wants NJ removed and to it. No c/o pain. Tolerating HD.     ROS:   A 10-point review of systems was negative except as noted above.    Curent Meds:  Current Facility-Administered Medications   Medication Dose Route Frequency Provider Last Rate Last Admin    levETIRAcetam (KEPPRA) tablet 750 mg  750 mg Oral BID Abiola Rowland PA-C   750 mg at 07/10/24 2250    magnesium oxide (MAG-OX) tablet 800 mg  800 mg Oral BID Abiola Rowland PA-C   800 mg at 07/10/24 2048    mycophenolate (GENERIC EQUIVALENT) capsule 750 mg  750 mg Oral BID Abiola Rowland PA-C   750 mg at 07/10/24 2048    pantoprazole (PROTONIX) EC tablet 40 mg  40 mg Oral Daily Abiola Rowland PA-C        predniSONE (DELTASONE) tablet 5 mg  5 mg Oral or Feeding Tube Daily Abiola Rowland PA-C   5 mg at 07/10/24 0913    sodium chloride (PF) 0.9% PF flush 3 mL  3 mL Intracatheter Q8H Abiola Rowland PA-C   3 mL at 07/10/24 2048    tacrolimus (GENERIC EQUIVALENT) capsule 4.5 mg  4.5 mg Oral BID IS Abiola Rowland PA-C   4.5 mg at 07/10/24 1808    ursodiol (ACTIGALL) capsule 300 mg  300 mg Oral or Feeding Tube TID Abiola Rowland PA-C   300 mg at 07/10/24 2048    valGANciclovir (VALCYTE) tablet 450 mg  450 mg Oral Daily Abiola Rowland PA-C   450 mg at 07/10/24 1138       Physical Exam:     Admit      Current Vitals:   BP  129/72 (BP Location: Right arm)   Pulse 89   Temp 99.2  F (37.3  C) (Oral)   Resp 16   Wt 82.1 kg (181 lb)   LMP 05/31/2006   SpO2 96%   BMI 30.12 kg/m           Vital sign ranges:    Temp:  [98.4  F (36.9  C)-99.2  F (37.3  C)] 99.2  F (37.3  C)  Pulse:  [87-91] 89  Resp:  [15-20] 16  BP: (125-137)/(72-86) 129/72  SpO2:  [96 %-98 %] 96 %  Patient Vitals for the past 24 hrs:   BP Temp Temp src Pulse Resp SpO2 Weight   07/11/24 0604 -- -- -- -- -- -- 82.1 kg (181 lb)   07/11/24 0533 129/72 99.2  F (37.3  C) Oral 89 16 96 % --   07/11/24 0203 131/81 98.8  F (37.1  C) Oral 91 16 98 % --   07/10/24 2146 136/78 98.5  F (36.9  C) Oral 87 15 97 % --   07/10/24 2033 125/74 99.2  F (37.3  C) Oral -- 16 98 % 82.7 kg (182 lb 6.4 oz)   07/10/24 1520 137/80 98.6  F (37  C) Oral -- 18 97 % --   07/10/24 0912 127/86 98.4  F (36.9  C) Oral -- 20 97 % --     General Appearance: in no apparent distress.   Skin: normal, dry  Heart: Well perfused  Lungs: Nonlabored resps on RA  Abdomen: soft, obese, incision c/d/I, no signs of infection. Drain clear output  : Voiding  Extremities:   Neurologic: awake, alert, and oriented. Tremor absent.    Frailty Scores          4/23/2024 2/4/2024 2/14/2023   Frailty Scores   Final Score Not Frail Not Frail Not Frail   Final Score Number 0 0 1      Details                   Data:   CMP  Recent Labs   Lab 07/11/24  0536 07/11/24  0200 07/10/24  0928 07/10/24  0656 07/10/24  0538 07/09/24  2202   NA  --   --   --  134*  --  135   POTASSIUM  --   --   --  3.9  --  3.6   CHLORIDE  --   --   --  101  --  99   CO2  --   --   --  25 --  24   * 117*   < > 106*  --  141*   BUN  --   --   --  10.5  --  12.7   CR  --   --   --  0.56  --  0.60   GFRESTIMATED  --   --   --  >90  --  >90   ARIEL  --   --   --  8.8  --  8.0*   MAG  --   --   --   --  2.0 1.4*   PHOS  --   --   --   --  2.6 2.6   LIPASE  --   --   --   --   --  44   ALBUMIN  --   --   --   --  3.0* 3.4*   BILITOTAL  --   --   --   --   0.6 0.8   ALKPHOS  --   --   --   --  125 139   AST  --   --   --   --  18 24   ALT  --   --   --   --  24 28    < > = values in this interval not displayed.     CBC  Recent Labs   Lab 07/10/24  0656 07/09/24 2202   HGB 7.3* 8.0*   WBC 9.8 11.0    347     COAGS  Recent Labs   Lab 07/09/24  2202   INR 1.02   PTT 27      Urinalysis  Recent Labs   Lab Test 07/09/24  2252 07/04/24  0059   COLOR Yellow Yellow   APPEARANCE Slightly Cloudy* Clear   URINEGLC Negative Negative   URINEBILI Negative Negative   URINEKETONE Negative Negative   SG 1.010 1.021   UBLD Negative Negative   URINEPH 8.0* 6.0   PROTEIN Negative 30*   NITRITE Negative Negative   LEUKEST Small* Small*   RBCU 3* 1   WBCU 4 14*     Virology:  Hepatitis C Antibody   Date Value Ref Range Status   06/20/2024 Nonreactive Nonreactive Final     Comment:     A nonreactive screening test result does not exclude the possibility of exposure to or infection with HCV. Nonreactive screening test results in individuals with prior exposure to HCV may be due to antibody levels below the limit of detection of this assay or lack of reactivity to the HCV antigens used in this assay. Patients with recent HCV infections (<3 months from time of exposure) may have false-negative HCV antibody results due to the time needed for seroconversion (average of 8 to 9 weeks).   05/20/2014 Negative NEG Final     I have reviewed history, examined patient and discussed plan with the fellow/resident/DINESH.    I concur with the findings in this note.    Total time spent on care: 30 minutes, of which the majority (20 min) were my time

## 2024-07-11 NOTE — PROGRESS NOTES
Admitted/transferred from: ED  Time of arrival on unit 2100  2 RN full  skin assessment completed by Johan Lujan  Skin assessment finding: Clamshell incision with staples KRISTIAN, previous JACEK site with primapore, BLE edema +2, generalized dry skin + peeling  Interventions/actions: None     Will continue to monitor.

## 2024-07-11 NOTE — PROGRESS NOTES
Care Management Discharge Note    Discharge Date: 07/11/2024       Discharge Disposition:  Home    Discharge Services:  Resumption of home care    Discharge DME:  None    Discharge Transportation:  Family or friend will provide    Private pay costs discussed: Not applicable    Does the patient's insurance plan have a 3 day qualifying hospital stay waiver?  No    PAS Confirmation Code:  N/A  Patient/family educated on Medicare website which has current facility and service quality ratings:  N/A    Education Provided on the Discharge Plan:  Yes  Persons Notified of Discharge Plans: Provider, home care agency  Patient/Family in Agreement with the Plan:  Yes    Handoff Referral Completed: Yes    Additional Information:  Per providers, pt medically ready for discharge. Writer updated home care agency. Home care referral placed.     Home Care  [facility] [phone]  Fax [phone]    Braulio Albrecht RNCC  Covering for 7A  Phone [phone]    RN Care Coordinator     Social Work and Care Management Department      SEARCHABLE in [facility] - search CARE COORDINATOR       [place] [address] [place] ([phone]) Saturday & Sunday; ([phone]) [facility] Recognized Holidays     Units: 5A Onc Vocera & 5C Vocera Pager: [phone]    Units: 6B Vocera & 6C Vocera  Pager: [phone]    Units: 7A SOT RNCC Vocera, 7B Med Surg Vocera, & 7C Med Surg Vocera  Pager: [phone]    Units: 6A Vocera & 4A CVICU Vocera, 4C MICU Vocera, and 4E SICU Vocera   Pager: [phone]    Units: 5 Ortho Vocera & 5 Med Surg Vocera  Pager: [phone]    Units: 6 Med Surg Vocera & 8 Med Surg Vocera  Pager [phone]

## 2024-07-11 NOTE — CONSULTS
[facility]  WO Nurse Inpatient Assessment     Consulted for: Buttock     Patient History (according to provider note(s):      Abiola Matute is a 59 year old female with history of HCC in the setting of DUNN/EtOH abuse s/p DCD liver transplant w/ biliary stent placement on 6/22/24 with Dr. Ball, osteopenia, LE cellulitis, Crohn's disease on Infliximab, obesity. Patient discharged on 6/28 and subsequently presented back to [facility] ED 6/30 in septic shock and admitted to the SICU for further workup and diagnostic studies. Infectious workup largely negative, antibiotics were stopped 7/3/24 and she was discharged 7/6/24.      Admitted 7/9/24 after witnessed grand mal seizure at home with post ictal state. She was brought to [facility] ED for further evaluation and had additional seizure that presented with similar movements around 11:00 PM which resolved after IV benzo administration. Per , since recent discharge denies fevers, chills, abdominal pain, nausea, vomiting, sick contacts, or signs and symptoms of infection. Denies prior seizures nor prior neurological disturbances.     Assessment:      Areas visualized during today's visit: Sacrum/coccyx  Wound location: left buttock    7/5 (previous admission)                               7/11  Wound due to:  Symmetric drug-related intertriginous and flexural exanthema (SDRIFE)  Wound history/plan of care: Dermatology following patient for SDRIFE. Buttock had one open area previously, now resolved   Wound base: 100 % blanchable , erythema, and epidermis      Palpation of the wound bed: normal      Drainage: none     Description of drainage: none     Measurements (length x width x depth, in cm): resolved      Tunneling: N/A     Undermining: N/A  Periwound skin: Erythema- blanchable and peeling , dry and flaky       Color: pink      Temperature: normal   Odor: none  Pain: no grimacing or signs of discomfort, none  Pain  interventions prior to dressing change: patient tolerated well and no significant pain present   Treatment goal: Protection and Promote epidermal migration  STATUS: initial assessment, this admission, improved from prior   Supplies ordered: supplies stored on unit and discussed with patient         Treatment Plan:     Buttock skin/wound: Daily apply moisturizing lotion recommended by dermatology, leave KRISTIAN, encourage patient mobility     Orders: Written    RECOMMEND PRIMARY TEAM ORDER: None, at this time  Education provided: plan of care, wound progress, and Moisture management  Discussed plan of care with: Patient and Family  [facility] nurse follow-up plan: signing off  Notify WO if wound(s) deteriorate.  Nursing to notify the Provider(s) and re-consult the [facility] Nurse if new skin concern.    DATA:     Current support surface: Standard  Standard Isoflex gel  Containment of urine/stool: Brief and Incontinent pad in bed  BMI: Body mass index is 30.12 kg/m .   Active diet order: Orders Placed This Encounter      Regular Diet Adult      Diet     Output: I/O last 3 completed shifts:  In: 1100 [P.O.:1100]  Out: -      Labs:   Recent Labs   Lab 07/11/24  0604 07/10/24  0538 07/09/24  2202   ALBUMIN 3.3*   < > 3.4*   HGB 7.6*   < > 8.0*   INR  --   --  1.02   WBC 10.2   < > 11.0    < > = values in this interval not displayed.     Pressure injury risk assessment:   Sensory Perception: 4-->no impairment  Moisture: 4-->rarely moist  Activity: 3-->walks occasionally  Mobility: 3-->slightly limited  Nutrition: 3-->adequate  Friction and Shear: 3-->no apparent problem  Eliseo Score: 20    Pager no longer is use, please contact through [employer] group: [facility] Nurse [place]  Dept. Office Number: [phone]

## 2024-07-11 NOTE — PLAN OF CARE
Goal Outcome Evaluation:  /81 (BP Location: Right arm)   Pulse 91   Temp 98.8  F (37.1  C) (Oral)   Resp 16   Wt 82.7 kg (182 lb 6.4 oz)   LMP 05/31/2006   SpO2 98%   BMI 30.35 kg/m      Shift: 9853-6854  VS: Vitals stable, room air, afebrile  Neuro: Alert and oriented x4  BG: Q4 123, 117, 105  Labs: Awaiting AM labs  Pain/Nausea: Denies pain and nausea  Diet: Regular   IV Access: PIV x1 saline locked  GI/: Voiding, LBM 7/10  Skin: Clamshell incision with staples, previous JACEK site with primapore, generalized dryness+peeling, BLE edema   Mobility: Up SBA  Plan: Continue with POC and notify team with any changes

## 2024-07-11 NOTE — TELEPHONE ENCOUNTER
Forms/Letter Request    Type of form/letter: Assisted Living Orders from [facility] order number [id number] with med letter    Do we have the form/letter: Yes: given to AVA Macdonald    Who is the form from? Home care    Where did/will the form come from? form was faxed in    When is form/letter needed by: when available    How would you like the form/letter returned: Fax : [phone]

## 2024-07-11 NOTE — DISCHARGE SUMMARY
[facility]    Discharge Summary  Transplant Surgery    Date of Admission:  7/9/2024  Date of Discharge:  7/11/2024  Discharging Provider: Kim Morel PA-C  Date of Service (when I saw the patient): 07/11/24    Discharge Diagnoses   Active Problems:    Hypomagnesemia    Hypocalcemia    Seizure (H)    History of liver transplant (H)        History of Present Illness   Abiola Matute is a 59 year old female with history of HCC in the setting of DUNN/EtOH abuse s/p DCD liver transplant w/ biliary stent placement on 6/22/24 with Dr. Ball, osteopenia, LE cellulitis, Crohn's disease on Infliximab, obesity. Patient discharged on 6/28 and subsequently presented back to [facility] ED 6/30 in septic shock and admitted to the SICU for further workup and diagnostic studies. Infectious workup largely negative, antibiotics were stopped 7/3/24 and she was discharged 7/6/24.      Admitted 7/9/24 after witnessed grand mal seizure at home with post ictal state. She was brought to [facility] ED for further evaluation and had additional seizure that presented with similar movements around 11:00 PM which resolved after IV benzo administration. Per , since recent discharge denies fevers, chills, abdominal pain, nausea, vomiting, sick contacts, or signs and symptoms of infection. Denies prior seizures nor prior neurological disturbances.     Hospital Course   Abiola Matute was admitted on 7/9/2024.  The following problems were addressed during her hospitalization:    Graft function: Stable function. Continue ASA and Ursodial.    Immunosuppression management:    mg BID  Tac 5.5 mg BID. Goal 8-12. Level 6. Dose increased to 5.5 mg BID.  Pred 5 mg daily    Seizure: Neurology consulted. Keppra loaded and keppra started overnight. Brain MRI reviewed by neurology. EEG monitoring stable. 7/10 CNS studies negative. Follow up CT scan head to re evaluate trans subdural hemorrhage  right occipital lobe with no change. Follow up with Neuro outpatient (~1 month).  Hematology:   Anemia of chronic disease: No acute issues. Hb ~7-8.    GI/Nutrition: Regular diet    Hypomagnesemia: Resolved with supplementation, PO and IV. Continue PTA Mg 800 mg BID.     Infectious disease: Afebrile, WBC stable. 7/10 LP, CSF studies negative thus far for infection.    Symmetric drug-related intertriginous and flexural exanthema (SDRIFE) 2/2 possible dapsone: Recent diagnosis last admission. Improved. Seen by WOC RN this admission. See wound care instructions for buttocks wound.      Kim Morel PA-C    Discharge Disposition   Discharged to home   Condition at discharge: Stable    Pending Results   These results will be followed up by Zayra Paulino    Unresulted Labs Ordered in the Past 30 Days of this Admission       Date and Time Order Name Status Description    7/10/2024 11:48 AM Cerebrospinal fluid Aerobic Bacterial Culture Routine With Gram Stain Preliminary     7/9/2024 11:20 PM Blood Culture Peripheral Blood Preliminary     7/9/2024 11:20 PM Blood Culture Peripheral Blood Preliminary     7/3/2024 12:36 PM Fungus Culture, non-blood Preliminary     6/23/2024  6:17 AM Prepare plasma (unit) Preliminary     6/22/2024 10:05 AM Prepare pheresed platelets (unit) Preliminary     6/22/2024  8:00 AM Fungal or Yeast Culture Routine Preliminary             Primary Care Physician   Linda Macdonald    /75 (BP Location: Right arm)   Pulse 80   Temp 98.5  F (36.9  C) (Oral)   Resp 16   Wt 82.1 kg (181 lb)   LMP 05/31/2006   SpO2 96%   BMI 30.12 kg/m      General Appearance: in no apparent distress.   Skin: r  Heart: Well perfused  Lungs: Nonlabored resps on RA  Abdomen: soft, obese, incision c/d/I, no signs of infection. Drain clear output  : Voiding  Extremities:   Neurologic: awake, alert, and oriented. Tremor absent.    Consultations This Hospital Stay   NEUROLOGY GENERAL ADULT IP CONSULT  INTERNAL  MEDICINE PROCEDURE TEAM ADULT IP CONSULT [place] - LUMBAR PUNCTURE  WOUND OSTOMY CONTINENCE NURSE  IP CONSULT  CARE MANAGEMENT / SOCIAL WORK IP CONSULT    Time Spent on this Encounter   I have spent greater than 30 minutes on this discharge.    Discharge Orders   Discharge Medications   Current Discharge Medication List        START taking these medications    Details   levETIRAcetam (KEPPRA) 750 MG tablet Take 1 tablet (750 mg) by mouth 2 times daily  Qty: 60 tablet, Refills: 4    Associated Diagnoses: Seizures (H)           CONTINUE these medications which have CHANGED    Details   furosemide (LASIX) 40 MG tablet Take 1 tablet (40 mg) by mouth daily as needed (Hold if weight < 181 lbs.)    Associated Diagnoses: S/P liver transplant (H)      !! tacrolimus (GENERIC EQUIVALENT) 0.5 MG capsule Take 1 capsule (0.5 mg) by mouth 2 times daily Total discharge dose = 5.5 mg BID  Qty: 60 capsule, Refills: 11    Associated Diagnoses: S/P liver transplant (H)      !! tacrolimus (GENERIC EQUIVALENT) 1 MG capsule Take 5 capsules (5 mg) by mouth 2 times daily Total discharge dose = 5.5 mg BID  Qty: 300 capsule, Refills: 11    Associated Diagnoses: S/P liver transplant (H)       !! - Potential duplicate medications found. Please discuss with provider.        CONTINUE these medications which have NOT CHANGED    Details   acetaminophen (TYLENOL) 325 MG tablet Take 2 tablets (650 mg) by mouth every 6 hours as needed for mild pain  Qty: 60 tablet, Refills: 0    Associated Diagnoses: Acute post-operative pain      albuterol (PROVENTIL) (2.5 MG/3ML) 0.083% neb solution Take 1 vial (2.5 mg) by nebulization every 28 days  Qty: 90 mL, Refills: 5    Associated Diagnoses: S/P liver transplant (H)      aspirin (ASA) 81 MG chewable tablet 1 tablet (81 mg) by Oral or Feeding Tube route daily  Qty: 30 tablet, Refills: 5    Associated Diagnoses: S/P liver transplant (H)      emollient (VANICREAM) external cream Apply topically every 6 hours as  needed for other (Dryness)  Qty: 453 g, Refills: 1    Associated Diagnoses: Xerosis cutis      magnesium oxide (MAG-OX) 400 MG tablet Take 2 tablets (800 mg) by mouth 2 times daily  Qty: 120 tablet, Refills: 3    Associated Diagnoses: Hypomagnesemia      mycophenolate (GENERIC EQUIVALENT) 250 MG capsule Take 3 capsules (750 mg) by mouth 2 times daily  Qty: 180 capsule, Refills: 11    Associated Diagnoses: S/P liver transplant (H)      pantoprazole (PROTONIX) 40 MG EC tablet Take 1 tablet (40 mg) by mouth daily  Qty: 30 tablet, Refills: 2    Associated Diagnoses: S/P liver transplant (H)      pentamidine (NEBUPENT) 300 MG neb solution Inhale 300 mg into the lungs every 28 days    Associated Diagnoses: S/P liver transplant (H)      polyethylene glycol (MIRALAX) 17 GM/Dose powder Take 17 g by mouth 2 times daily as needed for constipation  Qty: 510 g, Refills: 0    Associated Diagnoses: S/P liver transplant (H)      predniSONE (DELTASONE) 5 MG tablet 1 tablet (5 mg) by Oral or Feeding Tube route daily  Qty: 30 tablet, Refills: 2    Associated Diagnoses: S/P liver transplant (H)      psyllium (METAMUCIL) WAFR Take 2 Wafers by mouth daily  Qty: 60 Wafer, Refills: 2    Associated Diagnoses: S/P liver transplant (H)      traZODone (DESYREL) 50 MG tablet Take 0.5-1 tablets (25-50 mg) by mouth nightly as needed for sleep  Qty: 30 tablet, Refills: 0    Associated Diagnoses: Insomnia, unspecified type      ursodiol (ACTIGALL) 300 MG capsule 1 capsule (300 mg) by Oral or Feeding Tube route 3 times daily  Qty: 90 capsule, Refills: 11    Associated Diagnoses: S/P liver transplant (H)      valGANciclovir (VALCYTE) 450 MG tablet Take 1 tablet (450 mg) by mouth daily  Qty: 180 tablet, Refills: 0    Associated Diagnoses: S/P liver transplant (H)                Adult Neurology  Referral      Home Care Referral      Reason for your hospital stay    Seizure     Activity    Your activity upon discharge: Walk at least four times  a day, lift no greater than 10 pounds for 8 weeks from the time of surgery.  No driving while taking narcotics or 3 weeks after surgery.     Adult [facility] Follow-up and recommended labs and tests    [facility] FOLLOW UP:   1. Follow up in Transplant Clinic weekly for the next 4-5 weeks with Dr. Ball (or any available liver transplant surgeon). Next appointment on 7/15/24.   2. Follow up with Transplant Hepatology in 3 months.   3. Abdominal x-ray 3 months post-transplant to evaluate for biliary stent.  4. HCC. Follow up with Oncology in 3 months.   5. Follow up with [facility] neurology in 1 month.  Call your Transplant Coordinator ([phone]) with questions about Transplant Center appointment scheduling.     LABS:   CBC, BMP, magnesium, phosphorus, hepatic panel, tacrolimus level every Monday and Thursday by home health care nurse if arranged, or at an outpatient lab.     Monitor and record    Weight daily  Blood pressure weekly     When to contact your care team    WHEN TO CONTACT YOUR  COORDINATOR:     Transplant Coordinator: Zayra Paulino, phone: [phone]     Notify your coordinator if you have pain over your liver, increased redness or drainage from your incision, fever greater than 100F, or yellowing of skin or eyes.     Notify your coordinator immediately if you are ever unable to take your immunosuppressive medications for any reason.     If you have URGENT concerns after office hours, please call the hospital switchboard at [phone] and ask to have the organ transplant nurse on-call paged. If you have a life-threatening emergency, go to the nearest emergency room.     Discharge Instructions     Wound care and dressings    Instructions to care for your wound at home:   1. Incision staples will be removed on 7/15/24.    2. Buttock skin/wound: Daily apply moisturizing lotion recommended by dermatology, leave open to air, increase mobility, repositioning.     Diet    Follow  this diet upon discharge: Diet recommendations post-transplant: Heart healthy dietary habits long term (low saturated/trans fat, low sodium). High protein diet x 8 weeks. Practice food safety precautions.         Data   Most Recent 3 CBC's:  Recent Labs   Lab Test 07/11/24  0604 07/10/24  0656 07/09/24 2202   WBC 10.2 9.8 11.0   HGB 7.6* 7.3* 8.0*   * 102* 101*    314 347      Most Recent 3 BMP's:  Recent Labs   Lab Test 07/11/24  0604 07/11/24  0536 07/11/24  0200 07/10/24  0928 07/10/24  0656 07/09/24  2202   *  --   --   --  134* 135   POTASSIUM 4.1  --   --   --  3.9 3.6   CHLORIDE 101  --   --   --  101 99   CO2 24  --   --   --  25 24   BUN 9.5  --   --   --  10.5 12.7   CR 0.59  --   --   --  0.56 0.60   ANIONGAP 8  --   --   --  8 12   ARIEL 8.4*  --   --   --  8.8 8.0*   * 105* 117*   < > 106* 141*    < > = values in this interval not displayed.     Most Recent 2 LFT's:  Recent Labs   Lab Test 07/11/24  0604 07/10/24  0538   AST 16 18   ALT 23 24   ALKPHOS 136 125   BILITOTAL 0.8 0.6     Most Recent INR's and Anticoagulation Dosing History:  Anticoagulation Dose History  More data exists         Latest Ref Rng & Units 6/22/2024 6/23/2024 6/24/2024 6/25/2024 6/26/2024 7/3/2024 7/9/2024   Recent Dosing and Labs   INR 0.85 - 1.15 1.84  2.17  3.43  2.04  2.24  1.74  1.45  1.23  1.10  1.02       Details          Multiple values from one day are sorted in reverse-chronological order             Most Recent 3 Troponin's:  Recent Labs   Lab Test 10/21/19  0211   TROPI <0.015     Most Recent Cholesterol Panel:  Recent Labs   Lab Test 02/14/23  0824   CHOL 149   LDL 80   HDL 53   TRIG 78     Most Recent 6 Bacteria Isolates From Any Culture (See EPIC Reports for Culture Details):  Recent Labs   Lab Test 10/09/19  0950 03/28/19  1021 03/28/19  1009 02/26/18  1607 11/02/16  1044 08/03/16  1040   CULT No growth Moderate growth  Streptococcus agalactiae sero group B  *  Light growth  Escherichia  coli  *  Moderate growth  Streptococcus anginosus  *  Light growth  Strain 2  Escherichia coli  * Heavy growth  Mixed fecal sabrina    Unable to determine the presence of Actinomyces species due to an overgrowth of normal   sabrina.    Moderate growth  Bacteroides fragilis  Susceptibility testing not routinely done  *  Moderate growth  Mixed aerobic and anaerobic sabrina  *  Moderate growth  Streptococcus agalactiae sero group B  Susceptibility testing done on previous specimen  *  Light growth  Citrobacter freundii complex  *  Light growth  Escherichia coli  Susceptibility testing done on previous specimen  *  Moderate growth  Enterococcus faecalis  *  Light growth  Streptococcus anginosus  Susceptibility testing done on previous specimen  * >100,000 colonies/mL  Escherichia coli  * 10,000 to 50,000 colonies/mL mixed urogenital sabrina Susceptibility testing not   routinely done   Moderate growth Serratia marcescens  Moderate growth Coagulase negative Staphylococcus Susceptibility testing not   routinely done  *     Most Recent TSH, T4 and A1c Labs:  Recent Labs   Lab Test 07/09/24 2202 06/22/24  1613   TSH 3.20  --    A1C  --  5.1       Lab Results   Component Value Date    LIPASE 44 07/09/2024    LIPASE 24 06/30/2024    LIPASE 115 (H) 06/23/2024    LIPASE 205 04/25/2020    LIPASE 216 04/24/2020     Lab Results   Component Value Date    AMYLASE 114 (H) 06/23/2024    AMYLASE 124 (H) 06/20/2024    AMYLASE 79 04/24/2020      I have reviewed history, examined patient and discussed plan with the fellow/resident/DINESH.    I concur with the findings in this note.    Time spent on discharge activities: 45 minutes.

## 2024-07-11 NOTE — CONSULTS
Transplant Admission Psychosocial Assessment    Patient Name: Abiola Matute  : 1965  Age: 59 year old  MRN: [id number]  Date of Initial Social Work Evaluation: 2023    Patient underwent  donor liver transplant on 24.  Met with pt and family at bedside to update psychosocial assessment and provide education about SW role while inpatient, and to begin discussion of expectations/requirements, caregiver needs and follow up needs post-transplant.     Presenting Information   Living Situation: Virginia lives in a single family home with her spouse, Albino, and one of their sons Mario (adult).  If not local, plans for short term stay:  n/a  Previous Functional Status: Virginia previously indicated that her mobility would fluctuate; some days it was difficult to get off of the couch and others she was slower with her ADLs.   Cultural/Language/Spiritual Considerations: none identified    Support System  Primary Support Person Spouse, Albino  Other support:  has three adult children who are able to assist  Plan for support in immediate post-transplant period: Albino will provide primary support    Health Care Directive  Decision Maker: pt when able  Alternate Decision Maker: STEVEN Posada  Health Care Directive: Copy in Chart    Mental Health/Coping:   History of Mental Health: Virginia reports her overall mental health as stable  History of Chemical Health: Virginia has abstained from alcohol since 21.  Current status: no current concerns identified   Coping: appropriate  Services Needed/Recommended: none identified at this time    Financial   Income: SSDI  Impact of transplant on income: no impact identified  Insurance and medication coverage: Medicare and BCBS  Financial concerns: none identified at this time  Resources needed: none at this time    Education provided by SW: Social Work role inpatient setting, began discussing discharge planning.    Assessment and recommendations and plan:  pt readmitted to the  hospital on 7/9/24, prior to that, since the most recent discharge, pt and spouse deny any psychosocial concerns. Once pt is medically stable, anticipated discharge home. SW will continue to follow for any psychosocial needs.     Varsha SOARES, [facility]  Specialty Float   Phone: [phone]

## 2024-07-11 NOTE — PLAN OF CARE
Goal Outcome Evaluation:    A/Ox4. No seizure activity noted, ambulated to BR. Voiding freely, had 2 soft BM. Wound nurse consultation requested for redness at buttocks area. C/O generalized scaly skin. Completed CT head w contrast . Plan reviewed with pt and spouse with PA/Kim. Pt, is for discharge home. Discharge instructions provided. Belongings with pt. Spouse. Instructed to  discharge meds from the hospital pharmacy.  /75 (BP Location: Right arm)   Pulse 80   Temp 98.5  F (36.9  C) (Oral)   Resp 16   Wt 82.1 kg (181 lb)   LMP 05/31/2006   SpO2 96%   BMI 30.12 kg/m

## 2024-07-11 NOTE — TELEPHONE ENCOUNTER
FYI - Status Update    Who is Calling: [facility] - Order #[id number] - Cert Period: 06/29/24 - 08/27/24  Update    Update: see form    Does caller want a call/response back: ulises    Put in providers in box    Noemi K

## 2024-07-12 ENCOUNTER — HOSPITAL ENCOUNTER (INPATIENT)
Facility: CLINIC | Age: 59
LOS: 4 days | Discharge: HOME | DRG: 101 | End: 2024-07-16
Attending: STUDENT IN AN ORGANIZED HEALTH CARE EDUCATION/TRAINING PROGRAM | Admitting: TRANSPLANT SURGERY
Payer: MEDICARE

## 2024-07-12 ENCOUNTER — TELEPHONE (OUTPATIENT)
Dept: FAMILY MEDICINE | Facility: CLINIC | Age: 59
End: 2024-07-12

## 2024-07-12 ENCOUNTER — TELEPHONE (OUTPATIENT)
Dept: TRANSPLANT | Facility: CLINIC | Age: 59
End: 2024-07-12
Payer: MEDICARE

## 2024-07-12 ENCOUNTER — PATIENT OUTREACH (OUTPATIENT)
Dept: CARE COORDINATION | Facility: CLINIC | Age: 59
End: 2024-07-12
Payer: MEDICARE

## 2024-07-12 ENCOUNTER — APPOINTMENT (OUTPATIENT)
Dept: CT IMAGING | Facility: CLINIC | Age: 59
DRG: 101 | End: 2024-07-12
Payer: MEDICARE

## 2024-07-12 DIAGNOSIS — Z94.4 HISTORY OF LIVER TRANSPLANT (H): Primary | ICD-10-CM

## 2024-07-12 DIAGNOSIS — Z94.4 S/P LIVER TRANSPLANT (H): ICD-10-CM

## 2024-07-12 DIAGNOSIS — A04.72 INTESTINAL INFECTION DUE TO CLOSTRIDIUM DIFFICILE: ICD-10-CM

## 2024-07-12 DIAGNOSIS — R56.9 SEIZURE (H): Primary | ICD-10-CM

## 2024-07-12 DIAGNOSIS — R56.9 SEIZURES (H): ICD-10-CM

## 2024-07-12 LAB
ALBUMIN SERPL BCG-MCNC: 3.4 G/DL (ref 3.5–5.2)
ALP SERPL-CCNC: 149 U/L (ref 40–150)
ALT SERPL W P-5'-P-CCNC: 21 U/L (ref 0–50)
AMMONIA PLAS-SCNC: 20 UMOL/L (ref 11–51)
ANION GAP SERPL CALCULATED.3IONS-SCNC: 10 MMOL/L (ref 7–15)
AST SERPL W P-5'-P-CCNC: 19 U/L (ref 0–45)
BASOPHILS # BLD AUTO: 0 10E3/UL (ref 0–0.2)
BASOPHILS NFR BLD AUTO: 0 %
BILIRUB SERPL-MCNC: 0.7 MG/DL
BUN SERPL-MCNC: 13.3 MG/DL (ref 8–23)
CALCIUM SERPL-MCNC: 8.5 MG/DL (ref 8.8–10.2)
CHLORIDE SERPL-SCNC: 102 MMOL/L (ref 98–107)
CREAT SERPL-MCNC: 0.68 MG/DL (ref 0.51–0.95)
DEPRECATED HCO3 PLAS-SCNC: 23 MMOL/L (ref 22–29)
EGFRCR SERPLBLD CKD-EPI 2021: >90 ML/MIN/1.73M2
EOSINOPHIL # BLD AUTO: 0.6 10E3/UL (ref 0–0.7)
EOSINOPHIL NFR BLD AUTO: 7 %
ERYTHROCYTE [DISTWIDTH] IN BLOOD BY AUTOMATED COUNT: 19.5 % (ref 10–15)
GLUCOSE SERPL-MCNC: 134 MG/DL (ref 70–99)
HCT VFR BLD AUTO: 24.9 % (ref 35–47)
HGB BLD-MCNC: 7.9 G/DL (ref 11.7–15.7)
IMM GRANULOCYTES # BLD: 0.1 10E3/UL
IMM GRANULOCYTES NFR BLD: 1 %
INR PPP: 1.08 (ref 0.85–1.15)
LACTATE SERPL-SCNC: 1.3 MMOL/L (ref 0.7–2)
LYMPHOCYTES # BLD AUTO: 0.6 10E3/UL (ref 0.8–5.3)
LYMPHOCYTES NFR BLD AUTO: 7 %
MCH RBC QN AUTO: 32.5 PG (ref 26.5–33)
MCHC RBC AUTO-ENTMCNC: 31.7 G/DL (ref 31.5–36.5)
MCV RBC AUTO: 103 FL (ref 78–100)
MONOCYTES # BLD AUTO: 0.6 10E3/UL (ref 0–1.3)
MONOCYTES NFR BLD AUTO: 6 %
NEUTROPHILS # BLD AUTO: 7.1 10E3/UL (ref 1.6–8.3)
NEUTROPHILS NFR BLD AUTO: 79 %
NRBC # BLD AUTO: 0 10E3/UL
NRBC BLD AUTO-RTO: 0 /100
PLATELET # BLD AUTO: 405 10E3/UL (ref 150–450)
POTASSIUM SERPL-SCNC: 4.1 MMOL/L (ref 3.4–5.3)
PROT SERPL-MCNC: 7.2 G/DL (ref 6.4–8.3)
RBC # BLD AUTO: 2.43 10E6/UL (ref 3.8–5.2)
SODIUM SERPL-SCNC: 135 MMOL/L (ref 135–145)
WBC # BLD AUTO: 9 10E3/UL (ref 4–11)

## 2024-07-12 PROCEDURE — 70450 CT HEAD/BRAIN W/O DYE: CPT | Mod: MG

## 2024-07-12 PROCEDURE — 96375 TX/PRO/DX INJ NEW DRUG ADDON: CPT | Performed by: STUDENT IN AN ORGANIZED HEALTH CARE EDUCATION/TRAINING PROGRAM

## 2024-07-12 PROCEDURE — 120N000011 HC R&B TRANSPLANT UMMC

## 2024-07-12 PROCEDURE — 70450 CT HEAD/BRAIN W/O DYE: CPT | Mod: 26 | Performed by: RADIOLOGY

## 2024-07-12 PROCEDURE — 250N000013 HC RX MED GY IP 250 OP 250 PS 637

## 2024-07-12 PROCEDURE — 250N000013 HC RX MED GY IP 250 OP 250 PS 637: Performed by: TRANSPLANT SURGERY

## 2024-07-12 PROCEDURE — 82040 ASSAY OF SERUM ALBUMIN: CPT | Performed by: STUDENT IN AN ORGANIZED HEALTH CARE EDUCATION/TRAINING PROGRAM

## 2024-07-12 PROCEDURE — 96366 THER/PROPH/DIAG IV INF ADDON: CPT | Performed by: STUDENT IN AN ORGANIZED HEALTH CARE EDUCATION/TRAINING PROGRAM

## 2024-07-12 PROCEDURE — 85610 PROTHROMBIN TIME: CPT | Performed by: STUDENT IN AN ORGANIZED HEALTH CARE EDUCATION/TRAINING PROGRAM

## 2024-07-12 PROCEDURE — 85025 COMPLETE CBC W/AUTO DIFF WBC: CPT | Performed by: STUDENT IN AN ORGANIZED HEALTH CARE EDUCATION/TRAINING PROGRAM

## 2024-07-12 PROCEDURE — 99221 1ST HOSP IP/OBS SF/LOW 40: CPT | Mod: 24 | Performed by: TRANSPLANT SURGERY

## 2024-07-12 PROCEDURE — 250N000012 HC RX MED GY IP 250 OP 636 PS 637: Performed by: TRANSPLANT SURGERY

## 2024-07-12 PROCEDURE — 99291 CRITICAL CARE FIRST HOUR: CPT | Mod: 25 | Performed by: STUDENT IN AN ORGANIZED HEALTH CARE EDUCATION/TRAINING PROGRAM

## 2024-07-12 PROCEDURE — 250N000011 HC RX IP 250 OP 636: Performed by: STUDENT IN AN ORGANIZED HEALTH CARE EDUCATION/TRAINING PROGRAM

## 2024-07-12 PROCEDURE — 96365 THER/PROPH/DIAG IV INF INIT: CPT | Performed by: STUDENT IN AN ORGANIZED HEALTH CARE EDUCATION/TRAINING PROGRAM

## 2024-07-12 PROCEDURE — 82140 ASSAY OF AMMONIA: CPT | Performed by: STUDENT IN AN ORGANIZED HEALTH CARE EDUCATION/TRAINING PROGRAM

## 2024-07-12 PROCEDURE — G1010 CDSM STANSON: HCPCS | Performed by: RADIOLOGY

## 2024-07-12 PROCEDURE — 83605 ASSAY OF LACTIC ACID: CPT | Performed by: STUDENT IN AN ORGANIZED HEALTH CARE EDUCATION/TRAINING PROGRAM

## 2024-07-12 PROCEDURE — 99285 EMERGENCY DEPT VISIT HI MDM: CPT | Performed by: STUDENT IN AN ORGANIZED HEALTH CARE EDUCATION/TRAINING PROGRAM

## 2024-07-12 PROCEDURE — 36415 COLL VENOUS BLD VENIPUNCTURE: CPT | Performed by: STUDENT IN AN ORGANIZED HEALTH CARE EDUCATION/TRAINING PROGRAM

## 2024-07-12 RX ORDER — ACETAMINOPHEN 325 MG/1
650 TABLET ORAL EVERY 6 HOURS PRN
Status: DISCONTINUED | OUTPATIENT
Start: 2024-07-12 | End: 2024-07-16 | Stop reason: HOSPADM

## 2024-07-12 RX ORDER — ALBUTEROL SULFATE 0.83 MG/ML
2.5 SOLUTION RESPIRATORY (INHALATION) EVERY 6 HOURS PRN
Status: DISCONTINUED | OUTPATIENT
Start: 2024-08-02 | End: 2024-07-16 | Stop reason: HOSPADM

## 2024-07-12 RX ORDER — MYCOPHENOLATE MOFETIL 250 MG/1
750 CAPSULE ORAL 2 TIMES DAILY
Status: DISCONTINUED | OUTPATIENT
Start: 2024-07-12 | End: 2024-07-16 | Stop reason: HOSPADM

## 2024-07-12 RX ORDER — URSODIOL 300 MG/1
300 CAPSULE ORAL 3 TIMES DAILY
Status: DISCONTINUED | OUTPATIENT
Start: 2024-07-12 | End: 2024-07-16 | Stop reason: HOSPADM

## 2024-07-12 RX ORDER — FUROSEMIDE 20 MG
40 TABLET ORAL DAILY PRN
Status: DISCONTINUED | OUTPATIENT
Start: 2024-07-12 | End: 2024-07-12

## 2024-07-12 RX ORDER — VALGANCICLOVIR 450 MG/1
450 TABLET, FILM COATED ORAL DAILY
Status: DISCONTINUED | OUTPATIENT
Start: 2024-07-13 | End: 2024-07-16 | Stop reason: HOSPADM

## 2024-07-12 RX ORDER — PREDNISONE 5 MG/1
5 TABLET ORAL DAILY
Status: DISCONTINUED | OUTPATIENT
Start: 2024-07-13 | End: 2024-07-16 | Stop reason: HOSPADM

## 2024-07-12 RX ORDER — LEVETIRACETAM 500 MG/5ML
1000 INJECTION, SOLUTION, CONCENTRATE INTRAVENOUS ONCE
Status: COMPLETED | OUTPATIENT
Start: 2024-07-12 | End: 2024-07-12

## 2024-07-12 RX ORDER — PSYLLIUM SEED (WITH DEXTROSE)
2 POWDER (GRAM) ORAL DAILY
Status: DISCONTINUED | OUTPATIENT
Start: 2024-07-13 | End: 2024-07-16 | Stop reason: HOSPADM

## 2024-07-12 RX ORDER — MAGNESIUM OXIDE 400 MG/1
800 TABLET ORAL 2 TIMES DAILY
Status: DISCONTINUED | OUTPATIENT
Start: 2024-07-12 | End: 2024-07-16 | Stop reason: HOSPADM

## 2024-07-12 RX ORDER — ASPIRIN 81 MG/1
81 TABLET, CHEWABLE ORAL DAILY
Status: DISCONTINUED | OUTPATIENT
Start: 2024-07-13 | End: 2024-07-16 | Stop reason: HOSPADM

## 2024-07-12 RX ORDER — MAGNESIUM OXIDE 400 MG/1
TABLET ORAL
Status: COMPLETED
Start: 2024-07-12 | End: 2024-07-12

## 2024-07-12 RX ORDER — POLYETHYLENE GLYCOL 3350 17 G/17G
17 POWDER, FOR SOLUTION ORAL 2 TIMES DAILY PRN
Status: DISCONTINUED | OUTPATIENT
Start: 2024-07-12 | End: 2024-07-16 | Stop reason: HOSPADM

## 2024-07-12 RX ORDER — CYCLOSPORINE 25 MG/1
25 CAPSULE, LIQUID FILLED ORAL
Status: CANCELLED | OUTPATIENT
Start: 2024-07-12

## 2024-07-12 RX ORDER — MYCOPHENOLATE MOFETIL 250 MG/1
CAPSULE ORAL
Status: COMPLETED
Start: 2024-07-12 | End: 2024-07-12

## 2024-07-12 RX ORDER — LEVETIRACETAM 1000 MG/1
1000 TABLET ORAL 2 TIMES DAILY
Qty: 60 TABLET | Refills: 1 | Status: SHIPPED | OUTPATIENT
Start: 2024-07-12 | End: 2024-07-17

## 2024-07-12 RX ORDER — PANTOPRAZOLE SODIUM 40 MG/1
40 TABLET, DELAYED RELEASE ORAL DAILY
Status: DISCONTINUED | OUTPATIENT
Start: 2024-07-13 | End: 2024-07-16 | Stop reason: HOSPADM

## 2024-07-12 RX ORDER — CALCIUM CARBONATE 500 MG/1
500-1000 TABLET, CHEWABLE ORAL DAILY PRN
Status: DISCONTINUED | OUTPATIENT
Start: 2024-07-12 | End: 2024-07-16 | Stop reason: HOSPADM

## 2024-07-12 RX ORDER — LORAZEPAM 2 MG/ML
2 INJECTION INTRAMUSCULAR ONCE
Status: COMPLETED | OUTPATIENT
Start: 2024-07-12 | End: 2024-07-12

## 2024-07-12 RX ADMIN — MAGNESIUM OXIDE TAB 400 MG (241.3 MG ELEMENTAL MG) 800 MG: 400 (241.3 MG) TAB at 21:50

## 2024-07-12 RX ADMIN — URSODIOL 300 MG: 300 CAPSULE ORAL at 22:07

## 2024-07-12 RX ADMIN — LORAZEPAM 2 MG: 2 INJECTION INTRAMUSCULAR; INTRAVENOUS at 19:06

## 2024-07-12 RX ADMIN — MYCOPHENOLATE MOFETIL 750 MG: 250 CAPSULE ORAL at 21:51

## 2024-07-12 RX ADMIN — LEVETIRACETAM 1000 MG: 100 INJECTION, SOLUTION INTRAVENOUS at 18:30

## 2024-07-12 ASSESSMENT — ACTIVITIES OF DAILY LIVING (ADL)
ADLS_ACUITY_SCORE: 38

## 2024-07-12 NOTE — ED TRIAGE NOTES
"Ambulatory to ED;  Patient Story: Reports seizures x 4 this morning, \"5 to 7 minutes duration\" no loss of consciousness each time, blank stare, unable to respond verbally. Per  about 1.5 hours interval per seizure. Recent admission for recurring seizures, started on Keppra. Transplant patient June 2024.         "

## 2024-07-12 NOTE — TELEPHONE ENCOUNTER
Albino patient's  wanting to update team, that patient had a  seizure this morning, and also discuss a few other things with Coordinator. No other details was provided

## 2024-07-12 NOTE — TELEPHONE ENCOUNTER
"Spoke with dr Ball.     Pt will need to come to hospital and will be stopping tacrolimus.   Pt will need to stop tacro for 24 hours and then when level < 5 will start CSA.    1140 am had staring seizure.    229 pm staring seizure    Albino stated it's like she Fades out and stares.  Arms and legs were twitching, but no convulsions.     She stated \"Wall that comes down that she can't communicate through.\"     Spoke with admission and return call from admissions states that there are no beds available    Albino updated and will bring to the ER and call with any changes.    "

## 2024-07-12 NOTE — TELEPHONE ENCOUNTER
Albino stated that his wife's Tacrolimus is suppose to be delivered to the home today, and stated if provider is going to stop the tacrolimus, what should they do about the medication being delivered and would like for Coordinator to give him a call back

## 2024-07-12 NOTE — TELEPHONE ENCOUNTER
Spoke with Albino at  357pm. She is currently having another seizure.   She is staring and not responding. No breathing changes. He is going to bring her to the ER as soon as she is settled in. These were the same things as earlier. He has recorded these events.    Albino is aware that tacro shipment was stopped.

## 2024-07-12 NOTE — PROGRESS NOTES
Pt had witnessed absence seizure at approximately 1840, lasted for around 4 minutes. No signs of airway obstruction. MD and nurse at bedside. Keppra was given IV.

## 2024-07-12 NOTE — TELEPHONE ENCOUNTER
Spoke with Albino.    [place]ia had a seizure this morning. She drifted off in space. She smiled and then smile went away and then stared at ceiling.   It lasted a total of 3-4 mins. He did Capture a recording of part of the seizure. No complete convulsions. Hands pulled a little bit it.   She is resting now. No dizziness, no pain.  She was able to get up and walk to bathroom 10 mins after.     She did not have any different feelings or sensations before hand. She could hear and understood the words that Albino were saying during the seizure and could not respond verbally.     Neurology did let family know if mild that she could stay home, but if another large one she needed to go to the ER.     Contacted neurology  clinic.   Aug 26th is the soonest appt.  [facility] location. Pt is scheduled.    Paged Dr Lorenzo. Per dr Lorenzo patient to increase keppra to 1000 mg BID. If anymore mild seizures occur to page on call seizure/epilepsy MD from [facility] and if major seizure patient to go to the ER.    Spoke with [facility] and they do have available.     Albino will  today and start increase dose tonight.

## 2024-07-12 NOTE — TELEPHONE ENCOUNTER
Albino stated he wife had another seizure at 11:40 am and just has some questions regarding the new script for   levETIRAcetam (KEPPRA) 1000 MG tablet and requesting Coordinator to call him back, when she can.

## 2024-07-12 NOTE — PROGRESS NOTES
"Clinic Care Coordination Contact  Transitions of Care Outreach  Chief Complaint   Patient presents with    Clinic Care Coordination - Initial    Clinic Care Coordination - Post Hospital     Most Recent Admission Date: 7/9/2024   Most Recent Admission Diagnosis: Hypocalcemia - E83.51  Hypomagnesemia - E83.42  Seizure (H) - R56.9  History of liver transplant (H) - Z94.4     Most Recent Discharge Date: 7/11/2024   Most Recent Discharge Diagnosis: Seizure (H) - R56.9  History of liver transplant (H) - Z94.4  Hypomagnesemia - E83.42  Hypocalcemia - E83.51  S/P liver transplant (H) - Z94.4  Seizures (H) - R56.9     Transitions of Care Assessment    Discharge Assessment  How are you doing now that you are home?: I'm doing ok. She's had a couple of incidents where she has \"zoned out\". they talked to clinic.  How are your symptoms? (Red Flag symptoms escalate to triage hotline per guidelines): Improved  Do you know how to contact your clinic care team if you have future questions or changes to your health status? : Yes  Does the patient have their discharge instructions? : Yes  Does the patient have questions regarding their discharge instructions? : No  Were you started on any new medications or were there changes to any of your previous medications? : Yes  Does the patient have all of their medications?: Yes  Do you have questions regarding any of your medications? : No  Do you have all of your needed medical supplies or equipment (DME)?  (i.e. oxygen tank, CPAP, cane, etc.): Yes    Post-op ([facility]W CTA Only)  If the patient had a surgery or procedure, do they have any questions for a nurse?: No    Post-op (Clinicians Only)  Did the patient have surgery or a procedure: No  Fever: No  Chills: No  Eating & Drinking: eating and drinking without complaints/concerns  PO Intake: regular diet (High protein diet)  Bowel Function: normal  Date of last BM: 07/12/24  Urinary Status: voiding without complaint/concerns    Follow up Plan "     Discharge Follow-Up  Discharge follow up appointment scheduled in alignment with recommended follow up timeframe or Transitions of Risk Category? (Low = within 30 days; Moderate= within 14 days; High= within 7 days): No  Discharge Follow Up Appointment Date: 08/26/24  Discharge Follow Up Appointment Scheduled with?: Specialty Care Provider (Neurology)  Patient's follow up appointment not scheduled: Patient declined scheduling support. Education on the importance of transitions of care follow up. Provided scheduling phone number.    RN CC contacted patient for post-hospital follow up and to introduce Care Coordination. Patient requested RN CC speak to patient's , Albino. Full assessment not indicated as patient declined to have Care Coordination support at this time. He was agreeable to receiving an introduction letter with additional information on Care Coordination via [employer] and his email address: [email or web address]. Verified patient has access to [employer].     RN CC will send patient introduction letter via [employer] and send a copy to her  at his email address.     Future Appointments   Date Time Provider Department Center   7/15/2024  9:00 AM [facility]   7/15/2024 10:00 AM Pravin Ball MD [facility]   7/29/2024  8:30 AM [facility]   7/29/2024  9:30 AM Pravin Ball MD [facility]   7/29/2024  2:30 PM Sanaz Ortega RD [facility]   8/5/2024  8:45 AM  [facility]   8/5/2024 11:00 AM UC PFL PENT UCPFT [facility]   8/26/2024 10:00 AM Soo Lugo MD [facility]   9/27/2024  9:30 AM UCSCXR1 UCCXR [facility]   9/27/2024 10:00 AM [facility]   9/30/2024 11:30 AM Jeannette Cervantes MD [facility]   9/30/2024 12:30 PM Demetri Mariee, [facility]   Patient's  asked if patient should see her Primary Care Provider vs her transplant provider as the soonest Neurology appointment is on 8/26/24, which is 6  weeks away. RN CC recommended Albino contact the transplant coordinator to discuss best options. Albino was agreeable to this.     Outpatient Plan as outlined on AVS reviewed with patient.    For any urgent concerns, please contact our 24 hour nurse triage line: [phone] ([phone])       Tracey Boothe RN, BSN, CPHN, CCM  [facility]  [facility]  Phone: [phone]  Email: [email or web address]

## 2024-07-12 NOTE — ED PROVIDER NOTES
ED Provider Note  [facility]      History     Chief Complaint   Patient presents with    Seizures     HPI  Abiola Matute is a 59 year old female who has a history of hepatocellular carcinoma in setting of DUNN/EtOH with a liver transplant and biliary stent placement on 6/22, discharged on 628 presenting for possible seizures  She was admitted for fevers and antibiotics between 6/30-7/6  On 7/9 had seizure and got large workup,  at this point likely GTC, convulsive, not aware, discharged on 7/11 on keppra 750 BID  And today  had 4 seizures all lasting 4-8 min, hand twitching and aware throughout, but unable to talk or move  Patient denies any fevers, chills, nausea, vomiting            Physical Exam   BP: 129/77  Pulse: 96  Temp: 98.3  F (36.8  C)  Resp: 20  SpO2: 98 %  Physical Exam  Constitutional:       General: She is not in acute distress.     Appearance: Normal appearance. She is not diaphoretic.   HENT:      Head: Atraumatic.      Mouth/Throat:      Mouth: Mucous membranes are moist.   Eyes:      General: No scleral icterus.     Conjunctiva/sclera: Conjunctivae normal.   Cardiovascular:      Rate and Rhythm: Normal rate.      Heart sounds: Normal heart sounds.   Pulmonary:      Effort: No respiratory distress.      Breath sounds: Normal breath sounds.   Abdominal:      General: Abdomen is flat.   Musculoskeletal:      Cervical back: Neck supple.   Skin:     General: Skin is warm.      Findings: No rash.   Neurological:      General: No focal deficit present.      Mental Status: She is alert and oriented to person, place, and time.      Cranial Nerves: No cranial nerve deficit.      Sensory: No sensory deficit.      Motor: No weakness.      Coordination: Coordination normal.           ED Course, Procedures, & Data      Procedures                No results found for any visits on 07/12/24.  Medications   levETIRAcetam (KEPPRA) injection for EMERGENT loading dose 1,000 mg (has no  administration in time range)     Labs Ordered and Resulted from Time of ED Arrival to Time of ED Departure - No data to display  No orders to display          Critical Care Addendum  My initial assessment, based on my review of prehospital provider report, review of nursing observations, review of vital signs, focused history, physical exam, discussion with transplant surgery, neurology, and interpretation of labs, physical exam , established a high suspicion that Abiola Matute has  possible seizure disorder secondary to tacrolimus versus other neurologic disorder , which requires immediate intervention, and therefore she is critically ill.     After the initial assessment, the care team initiated multiple lab tests, initiated medication therapy with Keppra, Ativan, and consulted with neurology, transplant surgery  to provide stabilization care. Due to the critical nature of this patient, I reassessed nursing observations, vital signs, physical exam, review of cardiac rhythm monitor, 12 lead ECG analysis, interpretation of labs, and neurologic status multiple times prior to her disposition.     Time also spent performing documentation, discussion with family to obtain medical information for decision making, reviewing test results, discussion with consultants, and coordination of care.     Critical care time (excluding teaching time and procedures): 30 minutes.       Assessment & Plan    Emergency department patient's labs are baseline with a baseline anemia of 7.9, lactate of 1.3  Ordered Keppra 1 g when patient arrived for possible seizure prevention, but as she had 2 events here in the emergency room ordered Ativan which resolved  As events are patient is awake and aware, and unable to talk, with slight twitching but not localized to 1 area, unclear if patient is having seizures but patient is responding to Ativan  Consulted both neurology and transplant surgery, transplant surgery recommended consult with  neurology which was already placed as well as admission to their service and discussion with the surgery resident  Discussed with neurology who agree with plan  Spoke with surgery resident who accepted patient for admission and discussed nighttime orders for medications which they will place.    I have reviewed the nursing notes. I have reviewed the findings, diagnosis, plan and need for follow up with the patient.    New Prescriptions    No medications on file       Final diagnoses:   None       Jesse Hutchinson MD on 7/12/2024 at 8:14 PM   [facility] EMERGENCY DEPARTMENT  7/12/2024     Jesse Hutchinson MD  07/12/24 2014

## 2024-07-12 NOTE — TELEPHONE ENCOUNTER
Home Health Care      Reason for call:      [facility] - Order #[id number] - Cert Period: 06/29/24 - 08/27/24 - SN, PT and OT ordered    Orders are needed for this patient.  SN - Effective 07/07/24 1wk1, 3Q6wk6  PT Effective 07/07/24 No visits requested/scheduled  OT Effective 07/07/24 No visits requested/scheduled    Pt Provider: AVA Macdonald    Phone Number Homecare Nurse can be reached at: Fax       Could we send this information to you in [place]12 or would you prefer to receive a phone call?:   na    Put in providers in box    Noemi K

## 2024-07-12 NOTE — LETTER
[facility] CARE COORDINATION  [id number] [address]    July 12, 2024    Abiola Matute  [address] [address]      Dear [place],    I am a clinic care coordinator who works with DARYN Lucas CNP with the [facility]. I wanted to introduce myself and provide you with my contact information for you to be able to call me with any questions or concerns. I wanted to thank you for spending the time to talk with me.  Below is a description of clinic care coordination and how I can further assist you.       The clinic care coordination team is made up of a registered nurse, , financial resource worker and community health worker who understand the health care system. The goal of clinic care coordination is to help you manage your health and improve access to the health care system. Our team works alongside your provider to assist you in determining your health and social needs. We can help you obtain health care and community resources, providing you with necessary information and education. We can work with you through any barriers and develop a care plan that helps coordinate and strengthen the communication between you and your care team.  Our services are voluntary and are offered without charge to you personally.    Please feel free to contact me with any questions or concerns regarding care coordination and what we can offer.      We are focused on providing you with the highest-quality healthcare experience possible.    Sincerely,     Tracey Boothe RN, BSN, CPHN, [facility] Ambulatory Care Management  [facility]  Phone: [phone]  Email: [email or web address]    Enclosed: Additional information on Care Coordination.     WHAT IS CARE COORDINATION?      [facility] Care Coordination supports patients and families dealing with chronic or complex health conditions,  developmental issues, and social service needs. This service is available to patients of all ages, from babies to seniors. When you re facing a difficult decision about caring for yourself or someone you love, we can help you understand your options. We identify and refer you to community resources that help with financial, legal, mental health, transportation, and other issues. We also help with your medical and related education needs.     IS CARE COORDINATION RIGHT FOR ME? Discuss a referral to Care Coordination with your primary care provider or care team member.     HOW CAN I CONNECT WITH CARE COORDINATION?  Contact your clinic.  Speak with your doctor or clinic staff.  Discuss care coordination with hospital staff before discharge.     MEET YOUR CARE COORDINATION TEAM     Registered Nurse Care Coordinator  Provides education on medications, disease management, and new diagnoses.  Addresses concerns about medical conditions and connects you to resources.  Develops patient-centered goals and communicates with patient s care team.     Social Work Care Coordinator  Provides education on emotional wellbeing.  Connects you to a variety of community-based resources.  Communicates with care team and community partners.     Community Health Worker  Identifies health and social barriers and connects you with community resources.  Develops nonclinical patient and family centered goals.  Enhances communication between patients and care teams.     Financial Resource Worker  Assists patients with applying for [place] ([place], [employer], [place]  Helps patients with applying for county benefits.  Connects patients with [facility].

## 2024-07-13 ENCOUNTER — APPOINTMENT (OUTPATIENT)
Dept: NEUROLOGY | Facility: CLINIC | Age: 59
DRG: 101 | End: 2024-07-13
Payer: MEDICARE

## 2024-07-13 LAB
ALBUMIN SERPL BCG-MCNC: 3.3 G/DL (ref 3.5–5.2)
ALP SERPL-CCNC: 144 U/L (ref 40–150)
ALT SERPL W P-5'-P-CCNC: 18 U/L (ref 0–50)
ANION GAP SERPL CALCULATED.3IONS-SCNC: 10 MMOL/L (ref 7–15)
AST SERPL W P-5'-P-CCNC: 18 U/L (ref 0–45)
BILIRUB DIRECT SERPL-MCNC: 0.29 MG/DL (ref 0–0.3)
BILIRUB SERPL-MCNC: 0.7 MG/DL
BUN SERPL-MCNC: 11.8 MG/DL (ref 8–23)
CALCIUM SERPL-MCNC: 8.5 MG/DL (ref 8.8–10.2)
CHLORIDE SERPL-SCNC: 103 MMOL/L (ref 98–107)
CREAT SERPL-MCNC: 0.57 MG/DL (ref 0.51–0.95)
DEPRECATED HCO3 PLAS-SCNC: 23 MMOL/L (ref 22–29)
EGFRCR SERPLBLD CKD-EPI 2021: >90 ML/MIN/1.73M2
ENTEROCOCCUS FAECALIS: NOT DETECTED
ENTEROCOCCUS FAECIUM: NOT DETECTED
ERYTHROCYTE [DISTWIDTH] IN BLOOD BY AUTOMATED COUNT: 19.5 % (ref 10–15)
GLUCOSE BLDC GLUCOMTR-MCNC: 122 MG/DL (ref 70–99)
GLUCOSE SERPL-MCNC: 107 MG/DL (ref 70–99)
HCT VFR BLD AUTO: 24.1 % (ref 35–47)
HGB BLD-MCNC: 7.4 G/DL (ref 11.7–15.7)
LISTERIA SPECIES (DETECTED/NOT DETECTED): NOT DETECTED
MAGNESIUM SERPL-MCNC: 1.6 MG/DL (ref 1.7–2.3)
MCH RBC QN AUTO: 31.6 PG (ref 26.5–33)
MCHC RBC AUTO-ENTMCNC: 30.7 G/DL (ref 31.5–36.5)
MCV RBC AUTO: 103 FL (ref 78–100)
PHOSPHATE SERPL-MCNC: 3.8 MG/DL (ref 2.5–4.5)
PLATELET # BLD AUTO: 373 10E3/UL (ref 150–450)
POTASSIUM SERPL-SCNC: 4.2 MMOL/L (ref 3.4–5.3)
PROT SERPL-MCNC: 6.8 G/DL (ref 6.4–8.3)
RBC # BLD AUTO: 2.34 10E6/UL (ref 3.8–5.2)
SODIUM SERPL-SCNC: 136 MMOL/L (ref 135–145)
STAPHYLOCOCCUS AUREUS: NOT DETECTED
STAPHYLOCOCCUS EPIDERMIDIS: NOT DETECTED
STAPHYLOCOCCUS LUGDUNENSIS: NOT DETECTED
STAPHYLOCOCCUS SPECIES: NOT DETECTED
STREPTOCOCCUS AGALACTIAE: NOT DETECTED
STREPTOCOCCUS ANGINOSUS GROUP: NOT DETECTED
STREPTOCOCCUS PNEUMONIAE: NOT DETECTED
STREPTOCOCCUS PYOGENES: NOT DETECTED
STREPTOCOCCUS SPECIES: NOT DETECTED
TACROLIMUS BLD-MCNC: 4.7 UG/L (ref 5–15)
TME LAST DOSE: ABNORMAL H
TME LAST DOSE: ABNORMAL H
WBC # BLD AUTO: 8.3 10E3/UL (ref 4–11)

## 2024-07-13 PROCEDURE — 36415 COLL VENOUS BLD VENIPUNCTURE: CPT

## 2024-07-13 PROCEDURE — 99223 1ST HOSP IP/OBS HIGH 75: CPT | Mod: 24 | Performed by: STUDENT IN AN ORGANIZED HEALTH CARE EDUCATION/TRAINING PROGRAM

## 2024-07-13 PROCEDURE — 250N000013 HC RX MED GY IP 250 OP 250 PS 637

## 2024-07-13 PROCEDURE — 80197 ASSAY OF TACROLIMUS: CPT | Performed by: TRANSPLANT SURGERY

## 2024-07-13 PROCEDURE — 82248 BILIRUBIN DIRECT: CPT

## 2024-07-13 PROCEDURE — 95720 EEG PHY/QHP EA INCR W/VEEG: CPT | Performed by: PSYCHIATRY & NEUROLOGY

## 2024-07-13 PROCEDURE — 250N000012 HC RX MED GY IP 250 OP 636 PS 637: Performed by: NURSE PRACTITIONER

## 2024-07-13 PROCEDURE — 84100 ASSAY OF PHOSPHORUS: CPT

## 2024-07-13 PROCEDURE — 95714 VEEG EA 12-26 HR UNMNTR: CPT

## 2024-07-13 PROCEDURE — 83735 ASSAY OF MAGNESIUM: CPT

## 2024-07-13 PROCEDURE — 258N000003 HC RX IP 258 OP 636: Performed by: TRANSPLANT SURGERY

## 2024-07-13 PROCEDURE — 250N000011 HC RX IP 250 OP 636: Performed by: NURSE PRACTITIONER

## 2024-07-13 PROCEDURE — 80053 COMPREHEN METABOLIC PANEL: CPT

## 2024-07-13 PROCEDURE — 250N000012 HC RX MED GY IP 250 OP 636 PS 637

## 2024-07-13 PROCEDURE — 82962 GLUCOSE BLOOD TEST: CPT

## 2024-07-13 PROCEDURE — 120N000011 HC R&B TRANSPLANT UMMC

## 2024-07-13 PROCEDURE — 250N000011 HC RX IP 250 OP 636

## 2024-07-13 PROCEDURE — 85027 COMPLETE CBC AUTOMATED: CPT

## 2024-07-13 PROCEDURE — 99222 1ST HOSP IP/OBS MODERATE 55: CPT | Mod: 24 | Performed by: STUDENT IN AN ORGANIZED HEALTH CARE EDUCATION/TRAINING PROGRAM

## 2024-07-13 PROCEDURE — 250N000011 HC RX IP 250 OP 636: Performed by: TRANSPLANT SURGERY

## 2024-07-13 RX ORDER — MAGNESIUM SULFATE HEPTAHYDRATE 40 MG/ML
2 INJECTION, SOLUTION INTRAVENOUS ONCE
Status: COMPLETED | OUTPATIENT
Start: 2024-07-13 | End: 2024-07-13

## 2024-07-13 RX ORDER — LEVETIRACETAM 500 MG/1
1000 TABLET ORAL 2 TIMES DAILY
Status: DISCONTINUED | OUTPATIENT
Start: 2024-07-13 | End: 2024-07-16 | Stop reason: HOSPADM

## 2024-07-13 RX ORDER — VANCOMYCIN HYDROCHLORIDE
1500 EVERY 8 HOURS
Status: DISCONTINUED | OUTPATIENT
Start: 2024-07-13 | End: 2024-07-13

## 2024-07-13 RX ORDER — LEVETIRACETAM 750 MG/1
750 TABLET ORAL 2 TIMES DAILY
Status: DISCONTINUED | OUTPATIENT
Start: 2024-07-13 | End: 2024-07-13

## 2024-07-13 RX ORDER — LORAZEPAM 2 MG/ML
2 INJECTION INTRAMUSCULAR
Status: COMPLETED | OUTPATIENT
Start: 2024-07-13 | End: 2024-07-13

## 2024-07-13 RX ADMIN — ASPIRIN 81 MG CHEWABLE TABLET 81 MG: 81 TABLET CHEWABLE at 07:53

## 2024-07-13 RX ADMIN — MAGNESIUM OXIDE TAB 400 MG (241.3 MG ELEMENTAL MG) 800 MG: 400 (241.3 MG) TAB at 07:53

## 2024-07-13 RX ADMIN — PREDNISONE 5 MG: 5 TABLET ORAL at 07:54

## 2024-07-13 RX ADMIN — VALGANCICLOVIR HYDROCHLORIDE 450 MG: 450 TABLET ORAL at 07:54

## 2024-07-13 RX ADMIN — URSODIOL 300 MG: 300 CAPSULE ORAL at 14:11

## 2024-07-13 RX ADMIN — LEVETIRACETAM 1000 MG: 500 TABLET, FILM COATED ORAL at 20:25

## 2024-07-13 RX ADMIN — MAGNESIUM OXIDE TAB 400 MG (241.3 MG ELEMENTAL MG) 800 MG: 400 (241.3 MG) TAB at 20:26

## 2024-07-13 RX ADMIN — MYCOPHENOLATE MOFETIL 750 MG: 250 CAPSULE ORAL at 20:26

## 2024-07-13 RX ADMIN — URSODIOL 300 MG: 300 CAPSULE ORAL at 20:26

## 2024-07-13 RX ADMIN — MYCOPHENOLATE MOFETIL 750 MG: 250 CAPSULE ORAL at 07:53

## 2024-07-13 RX ADMIN — PANTOPRAZOLE SODIUM 40 MG: 40 TABLET, DELAYED RELEASE ORAL at 07:53

## 2024-07-13 RX ADMIN — LORAZEPAM 2 MG: 2 INJECTION INTRAMUSCULAR; INTRAVENOUS at 00:35

## 2024-07-13 RX ADMIN — VANCOMYCIN HYDROCHLORIDE 2000 MG: 1 INJECTION, POWDER, LYOPHILIZED, FOR SOLUTION INTRAVENOUS at 12:46

## 2024-07-13 RX ADMIN — URSODIOL 300 MG: 300 CAPSULE ORAL at 07:54

## 2024-07-13 RX ADMIN — MAGNESIUM SULFATE HEPTAHYDRATE 2 G: 2 INJECTION, SOLUTION INTRAVENOUS at 15:26

## 2024-07-13 RX ADMIN — CYCLOSPORINE 250 MG: 100 CAPSULE, LIQUID FILLED ORAL at 18:23

## 2024-07-13 ASSESSMENT — ACTIVITIES OF DAILY LIVING (ADL)
ADLS_ACUITY_SCORE: 39
ADLS_ACUITY_SCORE: 40
ADLS_ACUITY_SCORE: 39
ADLS_ACUITY_SCORE: 39
ADLS_ACUITY_SCORE: 40
ADLS_ACUITY_SCORE: 39
ADLS_ACUITY_SCORE: 39
ADLS_ACUITY_SCORE: 40
ADLS_ACUITY_SCORE: 39
ADLS_ACUITY_SCORE: 40
ADLS_ACUITY_SCORE: 39
ADLS_ACUITY_SCORE: 40
ADLS_ACUITY_SCORE: 38
ADLS_ACUITY_SCORE: 40
ADLS_ACUITY_SCORE: 39

## 2024-07-13 NOTE — CONSULTS
[facility]  Neurology Consultation    Patient Name:  Abiola Matute  MRN:  [id number]    :  1965  Date of Service:  2024  Primary care provider:  Linda Macdonald      Neurology consultation service was asked to see Abiola Matute to evaluate seizure.      DAY TEAM ADDENDUM  Patient was seen during morning rounds with Dr. Alonso. Videos were viewed and it was felt that the episodes described by the patient were less likely to be seizures and more likely to be confusion/fatigue possibly related to Keppra dosing.     CSF culture came back with gram + clusters noted in CSF culture with a negative meningitis panel. Given the patient's immunocompromised status, we recommend starting IV Vanc immediately.     Recs  - Continue Keppra at 750mg BID for the time being  - IV Vanc for positive gram + cocci in clusters noted in CSF culture  - vEEG for spell characterization   - CT Head with no visualized previous SDH.   - Discussed with primary team    Patient was discussed with Dr. Alonso.    Fritz Myrick,   Neurology PGY1      Chief Complaint:  spells concerning for seizure     History of Present Illness:   Abiola Matute is a 59 year old right-handed female, with history of Crohn's disease on infliximab and liver cirrhosis c/b HCC s/p liver transplant 2024 (current immunosuppression: mycophenolate, prednisone, and tacrolimus), who was recently admitted from  to  after having two seizures and being started on levetiracetam monotherapy.  The patient now presents again to the emergency department after six episodes of behavioral arrest on  concerning for recurrent seizures.    The patient's , who assisted in provision of the history, reports that around 9:45 AM on , the patient was suddenly noticed to be staring off into the middle distance and not speaking or following any commands.  During the spell, there was a period of time in  which the patient appeared to be moving her lips similar to a chewing or lipsmacking behavior.  After the patient started responding to the 's questions again, she did appear to be sleepy for approximately 15 minutes following the spell.  The  reports that there were then 5 additional events very similar to the one described above, each lasting 5-7 minutes and being followed by a period of somnolence.  The patient reports that she remembers these episodes and did not have a period of loss of awareness at any point during the day on 7/12.  She herself denies any focal weakness or numbness, residual confusion, changes in vision, changes in speech or swallow function, headache, or fever.  The patient's  reports that over the past couple days she has actually been sleeping better than she was previously.  She has not appeared particularly ill relative to her postsurgical baseline, with the exception of the spells described above.    After having two spells in the emergency department, the patient was given 1000 mg IV levetiracetam and 2 mg IV lorazepam.    As described in neurology notes from the patient's recent admission, the patient had two seizures on the evening of 7/9, characterized by an initial right arm automatism followed by right head turning and then generalized convulsions.  She was found on MRI of the brain to have a subtle subdural fluid collection overlying the right occipital lobe associated with some parenchymal enhancement.  She had been evaluated with lumbar puncture which revealed bland CSF studies including negative HSV and VZV PCR.  The plan, at the time of discharge, had been to maintain the patient on levetiracetam 750 mg twice daily and arrange follow-up in the epilepsy clinic.      ROS  A comprehensive ROS was performed and pertinent findings were included in HPI.     PMH  Past Medical History:   Diagnosis Date    Alcohol abuse     Alcoholic cirrhosis of liver with ascites      Anal fistula     Atypical ductal hyperplasia of breast 09/10/2010    ERT not recommended -left - and flat epithelial atypia-scheduled for breast biopsy 9/17/2010     Bifid uvula     Cholelithiasis     Contact perianal dermatitis and other eczema     recurrent - clobetasol     Crohn disease     Fear of flying      - gets Ativan prn.     HCC (hepatocellular carcinoma) (H) 2/1/2023    Hypertension     IBS (irritable bowel syndrome)      Past Surgical History:   Procedure Laterality Date    BENCH LIVER  6/22/2024    Procedure: Bench liver;  Surgeon: Pravin Ball MD;  Location: [facility] OR    BIOPSY ANAL N/A 3/28/2019    anal biopsy and culure placement of seton - Dr Fleming    BIOPSY BREAST Left 09/17/2010    - scheduled with Dr. Varma     BIOPSY LIVER  2019    COLONOSCOPY  2006    COLONOSCOPY N/A 12/23/2014    Procedure: COMBINED COLONOSCOPY, SINGLE OR MULTIPLE BIOPSY/POLYPECTOMY BY BIOPSY;  Surgeon: Diane Fleming MD;  Location: [facility]    COLONOSCOPY N/A 12/5/2019    Procedure: COLONOSCOPY, WITH POLYPECTOMY AND BIOPSY;  Surgeon: Farhan Schilling MD;  Location: [facility]    COLONOSCOPY N/A 2/27/2024    Procedure: COLONOSCOPY, WITH POLYPECTOMY AND BIOPSY;  Surgeon: Michelle Diaz MD;  Location: [facility] OR    ENDOSCOPIC RETROGRADE CHOLANGIOPANCREATOGRAM N/A 4/24/2020    Procedure: ENDOSCOPIC RETROGRADE CHOLANGIOPANCREATOGRAPHY WITH, sledge removal,sphincterotomy, stent in gallbladder and pancreatic duct stent, and balloon dilation;  Surgeon: Guru Isac Kraft MD;  Location: [facility] OR    ESOPHAGOSCOPY, GASTROSCOPY, DUODENOSCOPY (EGD), COMBINED N/A 12/5/2019    Procedure: ESOPHAGOGASTRODUODENOSCOPY (EGD);  Surgeon: Farhan Schilling MD;  Location: [facility]    ESOPHAGOSCOPY, GASTROSCOPY, DUODENOSCOPY (EGD), COMBINED N/A 5/11/2023    Procedure: Esophagoscopy, gastroscopy, duodenoscopy (EGD), combined;  Surgeon: Jeannette Cervantes MD;  Location: [facility]    EXAM UNDER ANESTHESIA ANUS N/A  3/28/2019    Procedure: EXAM UNDER ANESTHESIA ANUS;  Surgeon: Diane Fleming MD;  Location:  OR    EXAM UNDER ANESTHESIA ANUS N/A 2021    Procedure: EXAM UNDER ANESTHESIA OF ANUS, SETON PLACEMENT, EXCISION OF SKIN BRIDGE;  Surgeon: Avtar Nam MD;  [place] [facility] [place]    EXAM UNDER ANESTHESIA ANUS N/A 2023    Procedure: EXAM UNDER ANESTHESIA, ANUS, SETON EXCHANGE, partial fistulotomy;  Surgeon: Mary Dugan MD;  Location: [facility] [place]    HYSTERECTOMY, VAGINAL  2006    with Dr. Licha Zhou - with BSO for fibroids     IR GALLBLADDER DRAIN PLACEMENT  2020    IR SIRT (SELECTIVE INTERNAL RADIO THERAPY)  2023    IR VISCERAL ANGIOGRAM  2023    IR VISCERAL EMBOLIZATION  2023    LAPAROSCOPIC ABLATION LIVER TUMOR N/A 2023    Procedure: Diagnostic Laparoscopy, Laparoscopic microwave ablation of liver tumor intraoperative ultrasound of liver, lysis of adhesions 1 hour,;  Surgeon: Albino Saavedra MD;  Location: [facility] OR    OPEN REDUCTION INTERNAL FIXATION ANKLE Left at age 27    plates and screws removed at age 36    Pelviscopy with removal of bilateral hydrosalpinges.  04/15/2010    TRANSPLANT LIVER RECIPIENT  DONOR N/A 2024    Procedure: Transplant liver recipient  donor, with biliary stent placement;  Surgeon: Pravin Ball MD;  Location: [facility] OR    [facility] APPENDECTOMY  at age 14       Medications   I have personally reviewed the patient's medication list.     Allergies  I have personally reviewed the patient's allergy list.     Social History  Denies tobacco, alcohol, and recreational drug use     Family History    Reviewed.      Physical Examination   Vitals: BP (!) 140/86   Pulse 96   Temp 98.3  F (36.8  C) (Oral)   Resp 20   LMP 2006   SpO2 97%   General: Lying in bed, NAD  Head: NC/AT; being connected to vEEG  Eyes: no icterus, op pink and moist  Cardiac: Extremities warm, no edema.   Respiratory: non-labored on RA  GI: S/NT/ND  Skin:  No rash or lesion on exposed skin  Psych: Mood pleasant, affect congruent  Neuro:  Mental status: Awake, alert, attentive, oriented to self, time, place, and circumstance. Language is fluent and coherent with intact comprehension of complex commands, naming and repetition.  She was able to perform attention-based tasks, though there was some subtle psychomotor slowing present.  Cranial nerves: VFF, PERRL, conjugate gaze, EOMI, facial sensation intact, face symmetric, shoulder shrug strong, tongue/uvula midline, no dysarthria.  The patient's voice was slightly hypophonic and hoarse, but the patient's  said this is not necessarily out of the ordinary for her relative to her baseline.  Motor: Normal bulk and tone. No abnormal movements. 5/5 strength bilaterally in proximal and distal aspects of all four extremities.    Reflexes: Normo-reflexic and symmetric. Negative Magana, no clonus, toes down-going.  Sensory: Intact to light touch in proximal and distal aspects of all 4 extremities.  Coordination: FNF and HS without ataxia or dysmetria.   Gait: Deferred.    Investigations   I have personally reviewed pertinent labs, tests, and radiological imaging. Discussion of notable findings is included under Impression.     Was patient transferred from outside hospital?   No    Impression  This is a 59 year old right-handed female, with history of Crohn's disease on infliximab and liver cirrhosis c/b HCC s/p liver transplant 6/22/2024 (current immunosuppression: mycophenolate, prednisone, and tacrolimus), who was recently admitted from 7/9 to 7/11 after having two seizures and being started on levetiracetam monotherapy.  She presented again on 7/12 with six episodes of behavioral arrest and mouth automatisms, with a return to baseline between each event.  Her neurologic exam is notable for mild psychomotor slowing, but is otherwise intact.  There are no significant changes to the patient's basic laboratory studies  compared to those collected during her recent admission.  She is afebrile.    The spells described by the patient's  do contain several features concerning for seizure, and in fact may localize to the left frontal or temporal lobe similar to her prior events.  It would be helpful to characterize at least one of the spells on vEEG monitoring. The etiology of this patient's epilepsy remains somewhat unclear.    Recommendations  -Initiate video EEG monitoring (ordered for you).   -Please push the event button if the patient has any paroxysmal clinical events.  -Obtain noncontrasted CT of the head, to assess stability of the patient's small subdural hematoma (ordered for you).  -Continue levetiracetam 750 mg twice daily.  -S/p IV levetiracetam 1000 mg and IV lorazepam 2 mg in the emergency department, per EM provider.  -The inpatient general neurology consult service will continue to follow on 7/13.      Thank you for involving Neurology in the care of Abiola Matute.  Please do not hesitate to call with questions/concerns (consult pager [phone]).      Patient was discussed with Dr. Alonso.    Nomi Chao MD  Neurology, PGY3

## 2024-07-13 NOTE — PHARMACY-VANCOMYCIN DOSING SERVICE
Pharmacy Vancomycin Initial Note  Date of Service 2024  Patient's  1965  59 year old, female    Indication: Meningitis  GPC in clusters on CSF culture on day 3 of incubation    Current estimated CrCl = Estimated Creatinine Clearance: 111 mL/min (based on SCr of 0.57 mg/dL).    Creatinine for last 3 days  2024:  6:04 AM Creatinine 0.59 mg/dL  2024:  6:40 PM Creatinine 0.68 mg/dL  2024:  5:36 AM Creatinine 0.57 mg/dL    Recent Vancomycin Level(s) for last 3 days  No results found for requested labs within last 3 days.      Vancomycin IV Administrations (past 72 hours)        No vancomycin orders with administrations in past 72 hours.                    Nephrotoxins and other renal medications (From now, onward)      None            Contrast Orders - past 72 hours (72h ago, onward)      None            Plan:  Start vancomycin 2000 mg IV once followed by 1500 mg q8h.   Vancomycin monitoring method: Trough (Method 2 = manual dose calculation)  Vancomycin therapeutic monitoring goal: 15-20 mg/L  Pharmacy will check vancomycin levels as appropriate in 1-3 Days.    Serum creatinine levels will be ordered daily for the first week of therapy and at least twice weekly for subsequent weeks.      Adrain Garcia RPH

## 2024-07-13 NOTE — PROGRESS NOTES
VEEG monitoring preliminary results:    VEEG has been running for over one hour.  EEG showed no significant abnormalities.  No epileptiform activities, no clinical or electrographic seizures were observed.    Lacy Lorenzo MD  Neurology

## 2024-07-13 NOTE — CONSULTS
[facility]  Transplant Infectious Disease Consult Note - New Patient     Patient:  Abiola Matute, Date of birth 1965, Medical record number [id number]  Date of Visit:  07/13/2024  Consult requested by Dr. Pravin Ball for evaluation of GPCs in broth from CSF         Assessment and Recommendations:   Recommendations:  - GPCs in clusters seen in broth alone from CSF suspected to be contaminant  - Follow up final culture results for ID and susceptibility  - Diphtheroids in blood culture a contaminant. Other blood culture set from 7/9 remains negative to date  - Favor monitoring off antibiotics  - Do not recommend repeating blood cultures unless clinically indicated     Thank you very much for this consultation. Transplant Infectious Disease will continue to follow with you.    Assessment:    Infectious Disease issues include:    #GPCs in CSF culture from broth:  #Seizure like episodes:  Patient with witnessed grand mal seizure x 2 7/9 for which she was started on Keppra and discharged home soon after. CT head normal, MRI with trace subdural hemorrhage without mass effect. Now admitted with more subtle absence seizure like episodes at home starting 7/12. During original admission, patient underwent LP - 0 RBCs, 0 WBCs, glucose normal (68), protein borderline elevated (48, ULN 45), negative HSV and VZV PCRs, negative meningitis panel. Now with GPCs in clusters isolated in broth alone that too on 3rd day of incubation, aerobic culture otherwise negative  Patient's clinical presentation is not consistent with meningitis or encephalitis (no fevers, neck stiffness, photophobia, headache). Her imaging studies (multiple CTs and also a brain MRI) have shown no evidence or suspicion for infectious process. CSF studies near-pristine - no cells, normal glucose and near normal protein levels with a negative meningitis panel. In addition, no obvious focus of infection to spread to CNS - no  evidence of infection elsewhere. Highly unlikely in this case that patient would then have bacterial meningitis/CNS infectious process with a normal CSF and no symptoms other than seizures, even if she were immunocompromised. Lastly, does not have history of CNS instrumentation or shunt. Favor observing off antibiotics    #Blood culture with diphtheroids:  1/2 blood cultures (1/4 bottles) from 7/9/24 positive on 3rd day of incubation for gram positive rods suggestive of diphtheroids. These are usual blood contaminants. Monitor off antibiotics    Prior ID issues:  #Leukocytosis/Fevers: 1 time fever to 101F during last admission, associated with leukocytosis to 32 on 7/1. Extensive ID workup negative including blood cultures x2 (6/30, 7/1), urine culture (7/1), C diff negative (6/30). CT A/P from 6/30/14 with a R pleural effusion, a 5.4 x1.5 cm seroma vs hematoma in R flank, 6.8 x 2.4 cm resolving hematoma around the transplanted liver, and small volume ascites in the pelvis. R-sided thoracentesis (7/3/24) consistent with a transudate with 362 WBC, which were 48% lymphocytes. Transplant surgery not concerned for an infection in her perihepatic fluid collection. CMV VL negative   #Eosinophilia: Exact etiology unclear. 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 Primary team holding dapsone, since this was a potential culprit    Transplant Checklist  - QTc interval: 456 msec 7/9/24  - Pneumocystis prophylaxis: Initially on dapsone, but concern that this triggered SDRIFE syndrome so started on pentamidine 7/5/24.   - Bacterial prophylaxis: None  - Fungal prophylaxis: None  - Serostatus & viral prophylaxis: CMV D-/R+, HSV ?, EBV D+/R+ Valgancyclovir  - Immunization status:  Readdress at 3 months post-transplant  - Gamma globulin status: No lab  results found.  - Antibiotic allergies: Hives with sulfa and pain/itching with metronidazole. Should have allergy consult in coming months to assess for ongoing sulfa allergy.    - Code status: Full Code    I spent 85 mins on date of encounter between chart review (including labs, micro data and imaging), patient visit (interview and physical exam), documentation and communication with primary team     TAINA Sweeney  Staff Physician, Infectious Diseases  Pager [phone]          History of Infectious Disease Illness:     59 year old female with history of Crohn's (on infliximab) and cirrhosis 2/2 DUNN/EtOH c/b HCC s/p DDLT with biliary stent placement (6/22/2024). She was discharged on 6/28/24, re-admitted on 6/30/24 with fevers and patchy induration on RUE/axilla/flank, and then readmitted again between 7/9 - 7/11 for seizure. Now returns after discharge with seizure like episodes, ID consulted for positive CSF broth culture    Patient recently admitted after witnessed grand mal seizure on 7/9. In the ER, had similar episode, received IV benzo, subsequently started on Keppra. During hospitalization, CT Head negative. MRI Brain with trace subdural hemorrhage overlying the right occipital lobe without mass effect. LP 7/10, unremarkable and discharged home after no further episodes witnessed.     On 7/12, per , 4 separate witnessed episodes during the day which patient was staring off into space, not speaking or following commands, associated with some lip smacking movements. Usually lasting for 5-7 months, followed by improvement in mental status but at times, some somnolence after. Patient does not recall these episodes and is back to baseline mental status in between. Brought in - afebrile, stable vitals. CT head on admission with no acute abnormality, and resolution of previously seen subdural hemorrhage. At least 3-4 more witnessed episodes here    ID consulted as CSF from 7/10 with GPCs noted  in broth alone. Patient was empirically started on IV Vancomycin    Patient denies febrile episodes over the last 2 weeks. No history of recent infection, no prior upper respiratory, sinus or ear symptoms. No cough, SOB. No headache, photophobia, neck stiffness. No confusion in between seizure like episodes. No skin rash, joint pain. No urinary symptoms. Of note, she has had epidural injections in the past, but during the birth of her children in the 90s, not since. No spinal or CNS instrumentation. No hardware. No known  shunt      Transplants:  6/22/2024 (Liver), Postoperative day:  21.  Coordinator Zayra Paulino    Exposure History   Demographics: Lived in [place] for 39 years. Previously lived in [place] (grew up there) and moved to [place] in [id number].   Travel: Did travel to [place] 4 years ago for vacation. Didn't go hiking or do something that stirred up soil. [place] in 2023. [place], [place], [place]. Did vacation. Didn't go hiking, but did beach vacations.   Occupation: Currently disabled.  at [employer].   Hobbies: Enjoys spending time with grandchildren. Likes to golf. Lies to go camping. Does some yard work. Last did this in 2nd week in June (mowing lawn).   Animals: None   Substances: None   TB Exposure: No known exposure to TB. Never been to an endemic country. No group home exposure. No homeless shelter exposure. No healthcare exposure.   Strongyloides Exposure: No high risk exposure  Coccidioides Exposure: No high risk exposure  Chagas Exposure: No high risk exposure     Review of Systems:  Remaining systems reviewed and negative    Past Medical History:   Diagnosis Date    Alcohol abuse     Alcoholic cirrhosis of liver with ascites     Anal fistula     Atypical ductal hyperplasia of breast 09/10/2010    ERT not recommended -left - and flat epithelial atypia-scheduled for breast biopsy 9/17/2010     Bifid uvula     Cholelithiasis     Contact perianal dermatitis  and other eczema     recurrent - clobetasol     Crohn disease     Fear of flying      - gets Ativan prn.     HCC (hepatocellular carcinoma) (H) 2/1/2023    Hypertension     IBS (irritable bowel syndrome)        Past Surgical History:   Procedure Laterality Date    BENCH LIVER  6/22/2024    Procedure: Bench liver;  Surgeon: Pravin Ball MD;  Location: [facility] OR    BIOPSY ANAL N/A 3/28/2019    anal biopsy and culure placement of seton - Dr Fleming    BIOPSY BREAST Left 09/17/2010    - scheduled with Dr. Varma     BIOPSY LIVER  2019    COLONOSCOPY  2006    COLONOSCOPY N/A 12/23/2014    Procedure: COMBINED COLONOSCOPY, SINGLE OR MULTIPLE BIOPSY/POLYPECTOMY BY BIOPSY;  Surgeon: Diane Fleming MD;  Location:  GI    COLONOSCOPY N/A 12/5/2019    Procedure: COLONOSCOPY, WITH POLYPECTOMY AND BIOPSY;  Surgeon: Farhan Schilling MD;  Location: [facility] GI    COLONOSCOPY N/A 2/27/2024    Procedure: COLONOSCOPY, WITH POLYPECTOMY AND BIOPSY;  Surgeon: Michelle Diaz MD;  [place] [facility] [place]    ENDOSCOPIC RETROGRADE CHOLANGIOPANCREATOGRAM N/A 4/24/2020    Procedure: ENDOSCOPIC RETROGRADE CHOLANGIOPANCREATOGRAPHY WITH, sledge removal,sphincterotomy, stent in gallbladder and pancreatic duct stent, and balloon dilation;  Surgeon: Guru Isac Kraft MD;  Location: [facility] OR    ESOPHAGOSCOPY, GASTROSCOPY, DUODENOSCOPY (EGD), COMBINED N/A 12/5/2019    Procedure: ESOPHAGOGASTRODUODENOSCOPY (EGD);  Surgeon: Farhan Schilling MD;  Location: [facility] GI    ESOPHAGOSCOPY, GASTROSCOPY, DUODENOSCOPY (EGD), COMBINED N/A 5/11/2023    Procedure: Esophagoscopy, gastroscopy, duodenoscopy (EGD), combined;  Surgeon: Jeannette Cervantes MD;  Location: [facility] GI    EXAM UNDER ANESTHESIA ANUS N/A 3/28/2019    Procedure: EXAM UNDER ANESTHESIA ANUS;  Surgeon: Diane Fleming MD;  Location:  OR    EXAM UNDER ANESTHESIA ANUS N/A 2/26/2021    Procedure: EXAM UNDER ANESTHESIA OF ANUS, SETON PLACEMENT, EXCISION OF SKIN  BRIDGE;  Surgeon: Avtar Nam MD;  Location: [facility] OR    EXAM UNDER ANESTHESIA ANUS N/A 2023    Procedure: EXAM UNDER ANESTHESIA, ANUS, SETON EXCHANGE, partial fistulotomy;  Surgeon: Mary Dugan MD;  Location: [facility] OR    HYSTERECTOMY, VAGINAL  2006    with Dr. Licha Zhou - with BSO for fibroids     IR GALLBLADDER DRAIN PLACEMENT  2020    IR SIRT (SELECTIVE INTERNAL RADIO THERAPY)  2023    IR VISCERAL ANGIOGRAM  2023    IR VISCERAL EMBOLIZATION  2023    LAPAROSCOPIC ABLATION LIVER TUMOR N/A 2023    Procedure: Diagnostic Laparoscopy, Laparoscopic microwave ablation of liver tumor intraoperative ultrasound of liver, lysis of adhesions 1 hour,;  Surgeon: Albino Saavedra MD;  Location: [facility] OR    OPEN REDUCTION INTERNAL FIXATION ANKLE Left at age 27    plates and screws removed at age 36    Pelviscopy with removal of bilateral hydrosalpinges.  04/15/2010    TRANSPLANT LIVER RECIPIENT  DONOR N/A 2024    Procedure: Transplant liver recipient  donor, with biliary stent placement;  Surgeon: Pravin Ball MD;  Location: [facility] OR    ZZC APPENDECTOMY  at age 14       Family History   Problem Relation Age of Onset    Breast Cancer Mother     Gastrointestinal Disease Mother     Heart Disease Father 57    Hypertension Maternal Grandmother     Substance Abuse Maternal Grandfather     Diabetes Maternal Grandfather     Diabetes Paternal Grandmother     Heart Disease Paternal Grandfather     Cancer Sister     Skin Cancer Sister     Substance Abuse Maternal Uncle     Substance Abuse Maternal Uncle     Suicide Niece     Suicide Niece     Anesthesia Reaction No family hx of     Thrombosis No family hx of        Social History     Social History Narrative    calcium - drinks 5-6 large glasses skim milk/day    flex sig/colonoscopy -at age 49    sun precautions - discussed    mammogram - needs every 2 years in her 30's, then yearly from then on    Td booster -  and  4/27/2010    pneumovax -at age 59    DEXA -when perimenopausal    stool hemoccults - every year after age 40    ASA- start at age 39    mulvitamin - encouraged     Social History     Tobacco Use    Smoking status: Never     Passive exposure: Never    Smokeless tobacco: Never   Vaping Use    Vaping status: Never Used   Substance Use Topics    Alcohol use: Not Currently    Drug use: No       Immunization History   Administered Date(s) Administered    COVID-19 MONOVALENT 12+ (Pfizer) 04/05/2021, 04/21/2021, 12/22/2021    Influenza (IIV3) PF 11/01/2014    Influenza Vaccine 18-64 (Flublok) 10/03/2019, 09/15/2021, 11/11/2022    Influenza Vaccine >6 months,quad, PF 11/02/2016, 09/04/2020, 02/07/2024    Influenza Vaccine, 6+MO IM (QUADRIVALENT W/PRESERVATIVES) 09/23/2015, 10/05/2017    MMR 04/24/2007    Pneumococcal 20 valent Conjugate (Prevnar 20) 11/11/2022    Pneumococcal 23 valent 09/04/2020    TD,PF 7+ (Tenivac) 09/16/1999    TDAP Vaccine (Adacel) 09/04/2020    TDAP Vaccine (Boostrix) 04/07/2010    Zoster recombinant adjuvanted (SHINGRIX) 09/08/2020, 11/04/2020         Current Medications & Allergies:     Current Facility-Administered Medications   Medication Dose Route Frequency Provider Last Rate Last Admin    aspirin (ASA) chewable tablet 81 mg  81 mg Oral or Feeding Tube Daily Ed Benítez MD   81 mg at 07/13/24 0753    magnesium oxide (MAG-OX) tablet 800 mg  800 mg Oral BID Ed Benítez MD   800 mg at 07/13/24 0753    mycophenolate (GENERIC EQUIVALENT) capsule 750 mg  750 mg Oral BID Ed Benítez MD   750 mg at 07/13/24 0753    pantoprazole (PROTONIX) EC tablet 40 mg  40 mg Oral Daily Ed Benítez MD   40 mg at 07/13/24 0753    predniSONE (DELTASONE) tablet 5 mg  5 mg Oral or Feeding Tube Daily Ed Benítez MD   5 mg at 07/13/24 0754    psyllium (METAMUCIL) wafer 2 Wafer  2 Wafer Oral Daily Ed Benítez MD        ursodiol (ACTIGALL) capsule 300 mg  300 mg Oral or Feeding Tube TID Ed Benítez MD   300 mg at  07/13/24 0754    valGANciclovir (VALCYTE) tablet 450 mg  450 mg Oral Daily Ed Benítez MD   450 mg at 07/13/24 0754       Infusions/Drips:    Current Facility-Administered Medications   Medication Dose Route Frequency Provider Last Rate Last Admin       Allergies   Allergen Reactions    Blood Transfusion Related (Informational Only) Other (See Comments)     Patient has a history of a clinically significant antibody against RBC antigens.  Anti Jka identified at [facility] on 7/4/2024. A delay in compatible RBCs may occur.    Fish Oil      Redness and itching around eye area only - went away when fish oil capsules stopped     Metronidazole      pain/itching    Ppd [Tuberculin Purified Protein Derivative]     Sulfa Antibiotics      hives    Terbinafine And Related      Lamisil = mild urticarial reaction            Physical Exam:   Ranges for vital signs:  Temp:  [98.3  F (36.8  C)] 98.3  F (36.8  C)  Pulse:  [85-98] 93  Resp:  [16-20] 16  BP: (129-140)/(77-86) 138/78  SpO2:  [92 %-98 %] 96 %  There were no vitals filed for this visit.    Physical Examination:  GENERAL:  well-developed, well-nourished, in bed in no acute distress.  HEAD:  Head is normocephalic, atraumatic. EEG electrodes attached  EYES:  Eyes have anicteric sclerae without conjunctival injection   ENT:  Oropharynx is moist without exudates or ulcers. Tongue is midline  NECK:  Supple. No cervical lymphadenopathy  LUNGS:  Clear to auscultation bilateral. On room air, no use of accessory muscles  CARDIOVASCULAR:  Regular rate and rhythm with no murmur  ABDOMEN:  Normal bowel sounds, soft, nontender.   SKIN:  No acute rashes. No CVC  NEUROLOGIC:  Grossly nonfocal. Active x4 extremities         Laboratory Data:     Inflammatory & Autoimmune Markers    Recent Labs   Lab Test 07/09/24  2202 07/03/24  2216 07/01/24  2146 06/22/24  1613 06/12/24  0954 02/21/24  1314 02/07/24  0925 09/19/23  0919 07/15/22  1356 09/15/21  1239 08/19/21  1431   SED 95*  --    --   --   --  48*  --  66*   < > 48* 27   CRP  --   --   --   --   --   --   --   --   --  5.0 4.4   CRPI 60.50* 144.00*   < >  --    < > 7.63*   < > 16.73*   < >  --   --    G6PD  --   --   --  11.0  --   --   --   --   --   --   --     < > = values in this interval not displayed.     Metabolic Studies       Recent Labs   Lab Test 07/13/24  1156 07/13/24  0536 07/12/24  1840 07/10/24  0538 07/09/24  2221 07/09/24  2202 06/22/24  1617 06/22/24  1613   NA  --  136 135   < >  --  135   < >  --    POTASSIUM  --  4.2 4.1   < >  --  3.6   < >  --    CHLORIDE  --  103 102   < >  --  99   < >  --    CO2  --  23 23   < >  --  24   < >  --    ANIONGAP  --  10 10   < >  --  12   < >  --    BUN  --  11.8 13.3   < >  --  12.7   < >  --    CR  --  0.57 0.68   < >  --  0.60   < >  --    GFRESTIMATED  --  >90 >90   < >  --  >90   < >  --    * 107* 134*   < >  --  141*   < >  --    A1C  --   --   --   --   --   --   --  5.1   ARIEL  --  8.5* 8.5*   < >  --  8.0*   < >  --    PHOS  --  3.8  --    < >  --  2.6   < >  --    MAG  --  1.6*  --    < >  --  1.4*   < >  --    LACT  --   --  1.3  --  1.5  --    < > 7.5*   PCAL  --   --   --   --   --  0.23   < >  --    CKT  --   --   --   --   --  49  --   --     < > = values in this interval not displayed.       Hepatic Studies    Recent Labs   Lab Test 07/13/24  0536 07/12/24  1840 07/10/24  0538 07/09/24  2221 07/09/24  2202 07/03/24  2216 07/03/24  0637 12/07/18  1204 11/02/17  1609   BILITOTAL 0.7 0.7   < >  --  0.8   < > 1.9*   < > 1.9*   DBIL 0.29  --    < >  --  0.33*   < > 0.91*   < > 1.0*   ALKPHOS 144 149   < >  --  139   < > 127   < > 149   PROTTOTAL 6.8 7.2   < >  --  7.1   < > 5.0*   < > 7.9   ALBUMIN 3.3* 3.4*   < >  --  3.4*   < > 2.8*   < > 3.2*   AST 18 19   < >  --  24   < > 26   < > 73*   ALT 18 21   < >  --  28   < > 56*   < > 48   LDH  --   --   --   --  272*  --  258*  --   --    GGT  --   --   --   --   --   --   --   --  311*   CHELSEY  --  20  --  16  --   --    --   --   --     < > = values in this interval not displayed.     Hematology Studies   Recent Labs   Lab Test 07/13/24  0536 07/12/24  1840 07/11/24  0604 07/10/24  0656 07/09/24 2202 07/08/24  0915 07/06/24  0724 07/05/24  0608   WBC 8.3 9.0 10.2 9.8 11.0   < > 13.5* 16.2*   ANEU  --   --   --   --   --   --  11.3* 12.6*   ANEUTAUTO  --  7.1  --   --  8.4*  --   --   --    ALYM  --   --   --   --   --   --  1.4 1.8   ALYMPAUTO  --  0.6*  --   --  0.9  --   --   --    JOSSY  --   --   --   --   --   --  0.1 0.6   AMONOAUTO  --  0.6  --   --  0.7  --   --   --    AEOS  --   --   --   --   --   --  0.7 0.8*   AEOSAUTO  --  0.6  --   --  0.8*  --   --   --    ABSBASO  --  0.0  --   --  0.1  --   --   --    HGB 7.4* 7.9* 7.6* 7.3* 8.0*   < > 7.4* 7.5*   HCT 24.1* 24.9* 23.8* 22.6* 24.4*   < > 22.3* 22.2*    405 374 314 347   < > 338 330    < > = values in this interval not displayed.       Clotting Studies    Recent Labs   Lab Test 07/12/24 1840 07/09/24 2202 07/03/24  1343 06/26/24  0600   INR 1.08 1.02 1.10 1.23*   PTT  --  27 32  --      Urine Studies     Recent Labs   Lab Test 07/09/24 2252 07/04/24  0059 07/01/24 2236 06/30/24  1320 06/20/24  1944   URINEPH 8.0* 6.0 6.0 8.0* 7.0   NITRITE Negative Negative Negative Negative Negative   LEUKEST Small* Small* Trace* Small* Negative   WBCU 4 14* 11* 4 <1       Medication levels    Recent Labs   Lab Test 07/11/24  0604 07/03/24  0637 07/02/24  0635   VANCOMYCIN  --   --  18.8   TACROL 6.0   < >  --     < > = values in this interval not displayed.       CSF testing     Recent Labs   Lab Test 07/10/24  1432   CWBC 0   CRBC 0   CGLU 68   CTP 48.8*       Microbiology:  Fungal testing  Recent Labs   Lab Test 07/03/24  1649   COFUNG <1:2       Last Culture results   Rapid Strep A Screen   Date Value Ref Range Status   09/26/2009   Final    NEGATIVE: No Group A streptococcal antigen detected by immunoassay, await   culture report.     Culture   Date Value Ref Range  Status   07/10/2024 Culture in progress  Preliminary   07/10/2024 (AA)  Preliminary    Isolated in broth only Gram positive cocci in clusters     Comment:     On day 3 of incubation   07/09/2024 No growth after 3 days  Preliminary   07/09/2024 No growth after 3 days  Preliminary   07/04/2024 <10,000 CFU/mL Urogenital sabrina  Final   07/03/2024 No Growth  Final   07/03/2024 No Growth  Final   07/03/2024 No Growth  Final   07/03/2024 No anaerobic organisms isolated  Final   07/03/2024 No growth after 9 days  Preliminary   07/01/2024 No Growth  Final   07/01/2024 No Growth  Final   07/01/2024 No Growth  Final   06/30/2024 No Growth  Final   06/30/2024 No Growth  Final   06/22/2024 No anaerobic organisms isolated  Final   06/22/2024 No growth after 21 days  Preliminary   06/22/2024 No Growth  Final   06/20/2024 <10,000 CFU/mL Mixture of Urogenital Sabrina  Final   06/20/2024   Final    No Vancomycin Resistant Enterococcus species isolated     GS Culture   Date Value Ref Range Status   06/22/2024 See corresponding culture for results  Final     Culture Micro   Date Value Ref Range Status   10/09/2019 No growth  Final   03/28/2019 (A)  Final    Moderate growth  Streptococcus agalactiae sero group B     03/28/2019 Light growth  Escherichia coli   (A)  Final   03/28/2019 Moderate growth  Streptococcus anginosus   (A)  Final   03/28/2019 Light growth  Strain 2  Escherichia coli   (A)  Final   03/28/2019 (A)  Final    Moderate growth  Bacteroides fragilis  Susceptibility testing not routinely done     03/28/2019 (A)  Final    Moderate growth  Mixed aerobic and anaerobic sabrina     03/28/2019 (A)  Final    Moderate growth  Streptococcus agalactiae sero group B  Susceptibility testing done on previous specimen     03/28/2019 Light growth  Citrobacter freundii complex   (A)  Final   03/28/2019 (A)  Final    Light growth  Escherichia coli  Susceptibility testing done on previous specimen     03/28/2019 Moderate growth  Enterococcus  faecalis   (A)  Final   03/28/2019 (A)  Final    Light growth  Streptococcus anginosus  Susceptibility testing done on previous specimen     03/28/2019 Heavy growth  Mixed fecal sabrina    Final   03/28/2019   Final    Unable to determine the presence of Actinomyces species due to an overgrowth of normal   sabrina.             Last checks of Clostridioides difficile testing  Recent Labs   Lab Test 06/30/24  2312   CDBPCT Negative       Syphilis Testing    Treponema Antibody Total   Date Value Ref Range Status   02/14/2023 Nonreactive Nonreactive Final       Quantiferon testing   Recent Labs   Lab Test 07/12/24  1840 07/09/24  2202 07/07/23  0912 02/14/23  0824 07/15/22  1356 06/10/22  1358   TBRES  --   --   --  Negative  --  Negative   LYMPH 7 8   < >  --    < >  --     < > = values in this interval not displayed.     Virology:  Coronavirus-19 testing    Recent Labs   Lab Test 07/10/24  0916 06/20/24  1825 02/25/21  1448   [id number] Negative Negative Test received-See reflex to IDDL test SARS CoV2 (COVID-19) Virus RT-PCR  NEGATIVE   [id number]  --   --  Nasopharyngeal   [id number]  --   --  Nasopharyngeal       Viral loads    Recent Labs   Lab Test 07/09/24  2329 06/30/24  1218   EBQI Not Detected  --    CMVQNT Not Detected Not Detected       CMV viral loads    Recent Labs   Lab Test 07/09/24  2329 06/30/24  1218   CMVQNT Not Detected Not Detected     Hepatitis B Testing     Recent Labs   Lab Test 06/20/24  1651 02/14/23  0824   AUSAB <3.50 1.41   HBCAB Nonreactive Nonreactive   HEPBANG Nonreactive Nonreactive        Hepatitis C Antibody   Date Value Ref Range Status   06/20/2024 Nonreactive Nonreactive Final     Comment:     A nonreactive screening test result does not exclude the possibility of exposure to or infection with HCV. Nonreactive screening test results in individuals with prior exposure to HCV may be due to antibody levels below the limit of detection of this assay or lack of reactivity to the HCV  antigens used in this assay. Patients with recent HCV infections (<3 months from time of exposure) may have false-negative HCV antibody results due to the time needed for seroconversion (average of 8 to 9 weeks).   02/14/2023 Nonreactive Nonreactive Final   05/20/2014 Negative NEG Final       CMV Antibody IgG   Date Value Ref Range Status   06/20/2024 No detectable antibody. No detectable antibody.  Final   02/14/2023 No detectable antibody. No detectable antibody.  Final     CMV Antibody IgM   Date Value Ref Range Status   06/20/2024 Negative Negative Final     EBV Capsid Antibody IgG   Date Value Ref Range Status   06/20/2024 Positive (A) No detectable antibody. Final     Comment:     Suggests recent or past exposure.   02/14/2023 Positive (A) No detectable antibody. Final     Comment:     Suggests recent or past exposure.     EBV Capsid Antibody IgM   Date Value Ref Range Status   06/20/2024 No detectable antibody. No detectable antibody. Final       Imaging:  Recent Results (from the past 48 hour(s))   CT Head w/o Contrast    Narrative    EXAM: CT HEAD W/O CONTRAST  7/11/2024 1:44 PM     HISTORY: Follow up trans subdural hemorrhage right occipital lobe       COMPARISON: MR brain with and without contrast 7/10/2024 and CT head  without contrast 7/9/2024    TECHNIQUE: Using multidetector thin collimation helical acquisition  technique, axial, coronal and sagittal CT images from the skull base  to the vertex were obtained without intravenous contrast.   (topogram) image(s) also obtained and reviewed.    FINDINGS:  No acute intracranial hemorrhage, mass effect, or midline  shift. No acute loss of gray-white matter differentiation in the  cerebral hemispheres. Ventricles are proportionate to the cerebral  sulci. Clear basal cisterns. Mild diffuse parenchymal volume loss.    The bony calvaria and the bones of the skull base are normal. The  visualized portions of the paranasal sinuses and mastoid air cells  are  clear. Grossly normal orbits. Mild frontal scalp swelling has  decreased compared to prior. Trace subdural hemorrhage over the right  occipital lobe visualized on prior MRI is poorly visualized. Slightly  more CSF in the right cerebral convexity compared to the left side.      Impression    IMPRESSION: No acute intracranial pathology.    I have personally reviewed the examination and initial interpretation  and I agree with the findings.    SUSANA NEGRETE MD         SYSTEM ID:  [id number]   CT Head w/o Contrast    Narrative    EXAM: CT HEAD W/O CONTRAST  LOCATION: [facility]  DATE: 07/13/2024    INDICATION: Checking stability of SDH given new spells of seizure.  COMPARISON: CT head 07/11/2024 and MRI head 07/10/2024.  TECHNIQUE: Routine CT Head without IV contrast. Multiplanar reformats. Dose reduction techniques were used.    FINDINGS:  INTRACRANIAL CONTENTS: Previous thin subdural hemorrhage along the right occipital lobe on the MRI 07/10/2024 is not seen on CT. No new hemorrhage. No mass effect. No CT evidence of acute infarct. Normal parenchymal attenuation. Mild volume loss.    VISUALIZED ORBITS/SINUSES/MASTOIDS: No intraorbital abnormality. No paranasal sinus mucosal disease. No middle ear or mastoid effusion.    BONES/SOFT TISSUES: No acute abnormality.      Impression    IMPRESSION:  1.  No acute intracranial abnormality or significant change compared to the prior CT.    2.  Previously seen subdural hemorrhage along the right occipital lobe on 07/10/2024 MRI is not identified on CT.      MRI Brain 7/10/24:  IMPRESSION:  1.  No recent infarct or intracranial mass.  2.  Trace subdural hemorrhage overlying the right occipital lobe without mass effect. There is mild pachymeningeal enhancement overlying the right convexity favored to reflect a reactive change.  3.  Nonspecific symmetric signal changes involving the corticospinal tracts which can be seen in the  setting of chronic hepatic encephalopathy.

## 2024-07-13 NOTE — H&P
----- Service Performed and Documented by Resident or Fellow ------    [facility]  Seizures: post liver transplant, patient seen and agree with below  notes      H&P Note - Transplant Surgery Service  Date of Admission:  7/12/2024  Admitting Staff: Dr. Mendoza  Reason for Admission: Seizure Workup    Assessment & Plan: Surgery   Abiola Matute is a 59 year old female w/hx Chron's disease on infliximab, HTN, s/p DCD liver transplant w/biliary stent placement on 6/22/2024 with Dr. Ball, admitted to the transplant service on 7/12/2024 for evaluation of seizure-like activity and medication adjustment. Miss Matute had 6 episodes of seizure-like activity noticed by , Albino today. They contacted her transplant physicians, Dr. Ball, who recommended return to hospital for admission.     - Admit to transplant service  - Stop tacrolimus, Check levels in AM   - Continue other PTA meds  - Neurology will continue to follow        Drains: RLQ drain    Code Status:  Full    Clinically Significant Risk Factors Present on Admission          # Hypocalcemia: Lowest Ca = 8.4 mg/dL in last 2 days, will monitor and replace as appropriate     # Hypoalbuminemia: Lowest albumin = 3.4 g/dL at 7/12/2024  6:40 PM, will monitor as appropriate   # Drug Induced Platelet Defect: home medication list includes an antiplatelet medication   # Hypertension: Noted on problem list    # Anemia: based on hgb <11               The patient's care was discussed with the transplant fellow who will discuss with the staff surgeon.    Ed Benítez MD  General Surgery Resident  [facility]  Text page via [facility] Paging/Directory    ______________________________________________________________________    Chief Complaint   Seizures    History is obtained from the patient, her  Albino, and the patient's chart.     History of Present Illness    Abiola Matute is a 59 year old female w/hx Chron's disease on infliximab, HTN, HCC in setting of DUNN/EtOH use now s/p DCD liver transplant w/biliary stent placement on 6/22/2024 with Dr. Ball, admitted to the transplant service on 7/12/2024 for evaluation of seizure-like activity.     Patient was discharged following liver transplant on 6/28/24. On 6/30, had low grade fever which was treated with Tylenol. This was a contraindication to her established medication protocol so she was told to return to hospital. Had multiple rounds of broad spectrum antibiotics for septic shock and was admitted to SICU, discharged on 7/6/2024. On 7/9, had a seizure in living room with arm contractures, swatting at head, and + for biting tongue. She was BIBA to the ED, started on Keppra, and discharged yesterday.     Today, patient had 6 episodes of new transient confusion, abnormal hand/arm movements, and staring into space. Her transplant physician, Dr. Ball, requested she return to the hospital for admission and evaluation of medication regimen.     Patient reports no headache, fever, N/V, abdominal pain, constipation, diarrhea. No difficulty urinating although color tends to be dark yellow.       Past Medical History    Past Medical History:   Diagnosis Date    Alcohol abuse     Alcoholic cirrhosis of liver with ascites     Anal fistula     Atypical ductal hyperplasia of breast 09/10/2010    ERT not recommended -left - and flat epithelial atypia-scheduled for breast biopsy 9/17/2010     Bifid uvula     Cholelithiasis     Contact perianal dermatitis and other eczema     recurrent - clobetasol     Crohn disease     Fear of flying      - gets Ativan prn.     HCC (hepatocellular carcinoma) (H) 2/1/2023    Hypertension     IBS (irritable bowel syndrome)        Past Surgical History   Past Surgical History:   Procedure Laterality Date    BENCH LIVER  6/22/2024    Procedure: Bench liver;  Surgeon: Pravin Ball  MD;  Location: [facility] OR    BIOPSY ANAL N/A 3/28/2019    anal biopsy and culure placement of seton - Dr Fleming    BIOPSY BREAST Left 09/17/2010    - scheduled with Dr. Varma     BIOPSY LIVER  2019    COLONOSCOPY  2006    COLONOSCOPY N/A 12/23/2014    Procedure: COMBINED COLONOSCOPY, SINGLE OR MULTIPLE BIOPSY/POLYPECTOMY BY BIOPSY;  Surgeon: Diane Fleming MD;  Location:  GI    COLONOSCOPY N/A 12/5/2019    Procedure: COLONOSCOPY, WITH POLYPECTOMY AND BIOPSY;  Surgeon: Farhan Schilling MD;  Location: [facility] GI    COLONOSCOPY N/A 2/27/2024    Procedure: COLONOSCOPY, WITH POLYPECTOMY AND BIOPSY;  Surgeon: Michelle Diaz MD;  Location: [facility] OR    ENDOSCOPIC RETROGRADE CHOLANGIOPANCREATOGRAM N/A 4/24/2020    Procedure: ENDOSCOPIC RETROGRADE CHOLANGIOPANCREATOGRAPHY WITH, sledge removal,sphincterotomy, stent in gallbladder and pancreatic duct stent, and balloon dilation;  Surgeon: Guru Isac Kraft MD;  Location: [facility] OR    ESOPHAGOSCOPY, GASTROSCOPY, DUODENOSCOPY (EGD), COMBINED N/A 12/5/2019    Procedure: ESOPHAGOGASTRODUODENOSCOPY (EGD);  Surgeon: Farhan Schilling MD;  Location: [facility] GI    ESOPHAGOSCOPY, GASTROSCOPY, DUODENOSCOPY (EGD), COMBINED N/A 5/11/2023    Procedure: Esophagoscopy, gastroscopy, duodenoscopy (EGD), combined;  Surgeon: Jeannette Cervantes MD;  Location: [facility] GI    EXAM UNDER ANESTHESIA ANUS N/A 3/28/2019    Procedure: EXAM UNDER ANESTHESIA ANUS;  Surgeon: Diane Fleming MD;  Location:  OR    EXAM UNDER ANESTHESIA ANUS N/A 2/26/2021    Procedure: EXAM UNDER ANESTHESIA OF ANUS, SETON PLACEMENT, EXCISION OF SKIN BRIDGE;  Surgeon: Avtar Nam MD;  Location: [facility] OR    EXAM UNDER ANESTHESIA ANUS N/A 1/6/2023    Procedure: EXAM UNDER ANESTHESIA, ANUS, SETON EXCHANGE, partial fistulotomy;  Surgeon: Mary Dugan MD;  Location: [facility] OR    HYSTERECTOMY, VAGINAL  2006    with Dr. Licha Zhou - with BSO for fibroids     IR GALLBLADDER DRAIN  PLACEMENT  2020    IR SIRT (SELECTIVE INTERNAL RADIO THERAPY)  2023    IR VISCERAL ANGIOGRAM  2023    IR VISCERAL EMBOLIZATION  2023    LAPAROSCOPIC ABLATION LIVER TUMOR N/A 2023    Procedure: Diagnostic Laparoscopy, Laparoscopic microwave ablation of liver tumor intraoperative ultrasound of liver, lysis of adhesions 1 hour,;  Surgeon: Albino Saavedra MD;  Location: [facility] OR    OPEN REDUCTION INTERNAL FIXATION ANKLE Left at age 27    plates and screws removed at age 36    Pelviscopy with removal of bilateral hydrosalpinges.  04/15/2010    TRANSPLANT LIVER RECIPIENT  DONOR N/A 2024    Procedure: Transplant liver recipient  donor, with biliary stent placement;  Surgeon: Pravin Ball MD;  Location: [facility] OR    [facility] APPENDECTOMY  at age 14       Prior to Admission Medications   I have reviewed this patient's current medications  Current Outpatient Medications on File Prior to Encounter   Medication Sig Dispense Refill    acetaminophen (TYLENOL) 325 MG tablet Take 2 tablets (650 mg) by mouth every 6 hours as needed for mild pain 60 tablet 0    [START ON 2024] albuterol (PROVENTIL) (2.5 MG/3ML) 0.083% neb solution Take 1 vial (2.5 mg) by nebulization every 28 days 90 mL 5    aspirin (ASA) 81 MG chewable tablet 1 tablet (81 mg) by Oral or Feeding Tube route daily 30 tablet 5    emollient (VANICREAM) external cream Apply topically every 6 hours as needed for other (Dryness) 453 g 1    furosemide (LASIX) 40 MG tablet Take 1 tablet (40 mg) by mouth daily as needed (Hold if weight < 181 lbs.)      levETIRAcetam (KEPPRA) 1000 MG tablet Take 1 tablet (1,000 mg) by mouth 2 times daily 60 tablet 1    magnesium oxide (MAG-OX) 400 MG tablet Take 2 tablets (800 mg) by mouth 2 times daily 120 tablet 3    mycophenolate (GENERIC EQUIVALENT) 250 MG capsule Take 3 capsules (750 mg) by mouth 2 times daily 180 capsule 11    pantoprazole (PROTONIX) 40 MG EC tablet Take 1 tablet (40 mg)  by mouth daily 30 tablet 2    [START ON 8/2/2024] pentamidine (NEBUPENT) 300 MG neb solution Inhale 300 mg into the lungs every 28 days      polyethylene glycol (MIRALAX) 17 GM/Dose powder Take 17 g by mouth 2 times daily as needed for constipation 510 g 0    predniSONE (DELTASONE) 5 MG tablet 1 tablet (5 mg) by Oral or Feeding Tube route daily 30 tablet 2    psyllium (METAMUCIL) WAFR Take 2 Wafers by mouth daily 60 Wafer 2    tacrolimus (GENERIC EQUIVALENT) 0.5 MG capsule Take 1 capsule (0.5 mg) by mouth 2 times daily Total discharge dose = 5.5 mg BID 60 capsule 11    tacrolimus (GENERIC EQUIVALENT) 1 MG capsule Take 5 capsules (5 mg) by mouth 2 times daily Total discharge dose = 5.5 mg  capsule 11    traZODone (DESYREL) 50 MG tablet Take 0.5-1 tablets (25-50 mg) by mouth nightly as needed for sleep 30 tablet 0    ursodiol (ACTIGALL) 300 MG capsule 1 capsule (300 mg) by Oral or Feeding Tube route 3 times daily 90 capsule 11    valGANciclovir (VALCYTE) 450 MG tablet Take 1 tablet (450 mg) by mouth daily 180 tablet 0          Review of Systems    The 10 point Review of Systems is negative other than noted in the HPI or here.    Social History   I have reviewed this patient's social history and updated it with pertinent information if needed.  Social History     Tobacco Use    Smoking status: Never     Passive exposure: Never    Smokeless tobacco: Never   Vaping Use    Vaping status: Never Used   Substance Use Topics    Alcohol use: Not Currently    Drug use: No         Family History   I have reviewed this patient's family history and updated it with pertinent information if needed.  Family History   Problem Relation Age of Onset    Breast Cancer Mother     Gastrointestinal Disease Mother     Heart Disease Father 57    Hypertension Maternal Grandmother     Substance Abuse Maternal Grandfather     Diabetes Maternal Grandfather     Diabetes Paternal Grandmother     Heart Disease Paternal Grandfather     Cancer  Sister     Skin Cancer Sister     Substance Abuse Maternal Uncle     Substance Abuse Maternal Uncle     Suicide Niece     Suicide Niece     Anesthesia Reaction No family hx of     Thrombosis No family hx of          Allergies   Allergies   Allergen Reactions    Blood Transfusion Related (Informational Only) Other (See Comments)     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 A delay in compatible RBCs may occur.    Fish Oil      Redness and itching around eye area only - went away when fish oil capsules stopped     Metronidazole      pain/itching    Ppd [Tuberculin Purified Protein Derivative]     Sulfa Antibiotics      hives    Terbinafine And Related      Lamisil = mild urticarial reaction        Physical Exam   Vitals: Most Recent  Ranges (Last 24 hours)   Temp: 98.3  F (36.8  C)  Pulse: 96  BP: (!) 140/86  Resp: 20  SpO2: 97 %  O2 Device: None (Room air) Temp  Min: 98.3  F (36.8  C)  Max: 98.3  F (36.8  C)  Pulse  Min: 96  Max: 96  BP  Min: 129/77  Max: 140/86  Resp  Min: 20  Max: 20  SpO2  Min: 97 %  Max: 98 %     Physical Exam:  Gen: Appears stated age, resting comfortably.   HEENT: NC/AT. EEG leads in place.   Neuro: AOx4, no focal deficits  Psych: Affect appropriate, Behavior appropriate, Cooperative  Pulm: Non-labored on RA,  ABD: Soft, NT, ND. Incisions from transplant healing well with no surrounding erythema or swelling. Staples in place.  MSK: MAEx4, warm, PPP  Skin: No obvious rashes, jaundice, erythema        Data     I have personally reviewed the following data over the past 24 hrs:    9.0  \   7.9 (L)   / 405     135 102 13.3 /  134 (H)   4.1 23 0.68 \     ALT: 21 AST: 19 AP: 149 TBILI: 0.7   ALB: 3.4 (L) TOT PROTEIN: 7.2 LIPASE: N/A     Procal: N/A CRP: N/A Lactic Acid: 1.3       INR:  1.08 PTT:  N/A   D-dimer:  N/A Fibrinogen:  N/A       Imaging results reviewed over the past 24 hrs:   No results found for  this or any previous visit (from the past 24 hour(s)).

## 2024-07-13 NOTE — MEDICATION SCRIBE - ADMISSION MEDICATION HISTORY
Medication Scribe Admission Medication History    Admission medication history is complete. The information provided in this note is only as accurate as the sources available at the time of the update.    Information Source(s): Patient via in-person    Pertinent Information: Pt reported taking medications on PTA medication list as directed.     Changes made to PTA medication list:  Added: None  Deleted: None  Changed: None    Allergies reviewed with patient and updates made in EHR: yes    Medication History Completed By: Stacy Edmond 7/13/2024 8:19 AM    PTA Med List   Medication Sig Last Dose    acetaminophen (TYLENOL) 325 MG tablet Take 2 tablets (650 mg) by mouth every 6 hours as needed for mild pain 7/10/2024    [START ON 8/2/2024] albuterol (PROVENTIL) (2.5 MG/3ML) 0.083% neb solution Take 1 vial (2.5 mg) by nebulization every 28 days 7/12/2024    aspirin (ASA) 81 MG chewable tablet 1 tablet (81 mg) by Oral or Feeding Tube route daily 7/12/2024    emollient (VANICREAM) external cream Apply topically every 6 hours as needed for other (Dryness) 7/12/2024    furosemide (LASIX) 40 MG tablet Take 1 tablet (40 mg) by mouth daily as needed (Hold if weight < 181 lbs.) 7/12/2024    levETIRAcetam (KEPPRA) 1000 MG tablet Take 1 tablet (1,000 mg) by mouth 2 times daily 7/12/2024    magnesium oxide (MAG-OX) 400 MG tablet Take 2 tablets (800 mg) by mouth 2 times daily 7/12/2024    mycophenolate (GENERIC EQUIVALENT) 250 MG capsule Take 3 capsules (750 mg) by mouth 2 times daily 7/12/2024    pantoprazole (PROTONIX) 40 MG EC tablet Take 1 tablet (40 mg) by mouth daily 7/12/2024    [START ON 8/2/2024] pentamidine (NEBUPENT) 300 MG neb solution Inhale 300 mg into the lungs every 28 days 7/8/2024    polyethylene glycol (MIRALAX) 17 GM/Dose powder Take 17 g by mouth 2 times daily as needed for constipation Unknown    predniSONE (DELTASONE) 5 MG tablet 1 tablet (5 mg) by Oral or Feeding Tube route daily 7/12/2024    psyllium  (METAMUCIL) WAFR Take 2 Wafers by mouth daily 7/12/2024    tacrolimus (GENERIC EQUIVALENT) 0.5 MG capsule Take 1 capsule (0.5 mg) by mouth 2 times daily Total discharge dose = 5.5 mg BID 7/12/2024    tacrolimus (GENERIC EQUIVALENT) 1 MG capsule Take 5 capsules (5 mg) by mouth 2 times daily Total discharge dose = 5.5 mg BID 7/12/2024    traZODone (DESYREL) 50 MG tablet Take 0.5-1 tablets (25-50 mg) by mouth nightly as needed for sleep Past Week    ursodiol (ACTIGALL) 300 MG capsule 1 capsule (300 mg) by Oral or Feeding Tube route 3 times daily Past Week    valGANciclovir (VALCYTE) 450 MG tablet Take 1 tablet (450 mg) by mouth daily Past Week

## 2024-07-13 NOTE — PROGRESS NOTES
Transplant Surgery  Inpatient Daily Progress Note  07/13/2024    Assessment & Plan: Abiola Matute is a 59 year old female with history of HCC in the setting of DUNN/EtOH abuse s/p DCD liver transplant w/ biliary stent placement on 6/22/24 with Dr. Ball, osteopenia, LE cellulitis, Crohn's disease on Infliximab, obesity. Patient discharged on 6/28 and subsequently presented back to [facility] ED 6/30 in septic shock and admitted to the SICU for further workup and diagnostic studies. Infectious workup largely negative, antibiotics were stopped 7/3/24 and she was discharged 7/6/24.     Redmitted 7/9/24-7/11/24 after witnessed grand mal seizure at home with post ictal state. She was discharged on Keppra. She represented 7/12 with seizures.       Graft function:Liver transplant: 6/22/24: LFTs stable  Immunosuppression management:    mg BID  Tac on hold due to seizures. Level 4.7   Start Cyclosporine 7/13. Goal ~200  Pred 5 mg daily    Neuro:   Seizure: Neurology consulted. EEG monitoring. Neg so far. Loaded with IV Keppra 1G + IV lorazepam 2mg  Restart Keppra 750mg BID.   Brain MRI 7/10 with SD hemorrhage overlying Right occipital lobe. Repeat CToH 7/12 on admit stable.   -7/10 CNS studies :meningitis/encephalitis panel negative, +GPC in clusters in csf broth. Give vanco x1 -consult Neuro &ID  Hematology: No acute issues. Hb ~7-8. Transfuse for Hb < 7   Cardiorespiratory: Stable   GI/Nutrition: NPO-advance to Regular diet as tolerated, aspiration precautions  Endocrine: No acute issues  Fluid/Electrolytes:CIV  Hypomagnesemia: PTA Mg 800 mg BID. Mg 2g IV today.   : voiding  Infectious disease:   Afebrile, WBC stable.   7/10 LP, CSF studies with GPC  7/09 12 BC GPB diptheroids-per ID likely contaminant  Prophylaxis: DVT (low risk), seizure, fall  Disposition: ED -transfer to     DINESH/Fellow/Resident Provider: Tejal Gonzalez NP      Faculty: Haim Geronimo,  KALEE.D.    __________________________________________________________________  Transplant History: Admitted 7/9/2024 for seizure.   The patient has a history of liver failure due to hepatocelluar carcinoma.    6/22/2024 (Liver), Postoperative day: 21     Interval History: History is obtained from the patient, electronic health record, and patient's family  Overnight events: no acitvity per pt/spouse since arrival. Denies HA, neck pain/rigidity, no fever/chills, photophobia, lightheaded/dizziness. Denies nausea.     ROS:   A 10-point review of systems was negative except as noted above.    Curent Meds:  Current Facility-Administered Medications   Medication Dose Route Frequency Provider Last Rate Last Admin    aspirin (ASA) chewable tablet 81 mg  81 mg Oral or Feeding Tube Daily Ed Benítez MD   81 mg at 07/13/24 0753    levETIRAcetam (KEPPRA) tablet 750 mg  750 mg Oral BID Tejal Gonzalez, NP        magnesium oxide (MAG-OX) tablet 800 mg  800 mg Oral BID Ed Benítez MD   800 mg at 07/13/24 0753    mycophenolate (GENERIC EQUIVALENT) capsule 750 mg  750 mg Oral BID Ed Benítez MD   750 mg at 07/13/24 0753    pantoprazole (PROTONIX) EC tablet 40 mg  40 mg Oral Daily Ed Benítez MD   40 mg at 07/13/24 0753    predniSONE (DELTASONE) tablet 5 mg  5 mg Oral or Feeding Tube Daily Ed Benítez MD   5 mg at 07/13/24 0754    psyllium (METAMUCIL) wafer 2 Wafer  2 Wafer Oral Daily Ed Benítez MD        ursodiol (ACTIGALL) capsule 300 mg  300 mg Oral or Feeding Tube TID Ed Benítez MD   300 mg at 07/13/24 1411    valGANciclovir (VALCYTE) tablet 450 mg  450 mg Oral Daily Ed Benítez MD   450 mg at 07/13/24 0754    vancomycin (VANCOCIN) 1,500 mg in 0.9% NaCl 250 mL intermittent infusion  1,500 mg Intravenous Q8H Pravin Ball MD        vancomycin (VANCOCIN) 2,000 mg in sodium chloride 0.9 % 500 mL intermittent infusion  2,000 mg Intravenous Once Pravin Ball MD   2,000 mg at 07/13/24 1247        Physical Exam:     Admit      Current Vitals:   /79   Pulse 101   Temp 98.3  F (36.8  C) (Oral)   Resp 16   LMP 05/31/2006   SpO2 98%          Vital sign ranges:    Temp:  [98.3  F (36.8  C)] 98.3  F (36.8  C)  Pulse:  [] 101  Resp:  [16-20] 16  BP: (129-140)/(77-86) 134/79  SpO2:  [92 %-98 %] 98 %  Patient Vitals for the past 24 hrs:   BP Temp Temp src Pulse Resp SpO2   07/13/24 1344 134/79 -- -- 101 -- 98 %   07/13/24 1039 138/78 -- -- 93 -- 96 %   07/13/24 0800 136/82 -- -- 96 -- 95 %   07/13/24 0600 -- -- -- 88 -- 93 %   07/13/24 0542 133/78 98.3  F (36.8  C) Oral 98 -- --   07/13/24 0530 -- -- -- 94 -- 96 %   07/13/24 0515 -- -- -- 92 -- 95 %   07/13/24 0500 -- -- -- 90 -- --   07/13/24 0445 -- -- -- 90 -- --   07/13/24 0430 -- -- -- 91 -- 94 %   07/13/24 0415 -- -- -- 88 -- 94 %   07/13/24 0400 -- -- -- 93 -- 94 %   07/13/24 0345 -- -- -- 91 -- 95 %   07/13/24 0330 -- -- -- 88 -- 96 %   07/13/24 0315 -- -- -- 85 -- 92 %   07/13/24 0300 -- -- -- 91 -- 95 %   07/13/24 0245 -- -- -- 89 -- 94 %   07/13/24 0230 -- -- -- 93 -- 95 %   07/13/24 0215 -- -- -- 91 -- 94 %   07/13/24 0200 -- -- -- 94 -- 95 %   07/13/24 0145 -- -- -- 91 -- 94 %   07/13/24 0130 -- -- -- 88 -- 94 %   07/13/24 0115 -- -- -- 91 -- 93 %   07/13/24 0100 -- -- -- 89 -- 95 %   07/13/24 0051 -- 98.3  F (36.8  C) Oral -- 16 --   07/13/24 0050 136/79 -- -- 91 -- 96 %   07/13/24 0045 -- -- -- 89 -- 96 %   07/13/24 0040 -- -- -- 90 -- 96 %   07/13/24 0035 -- -- -- 90 -- 97 %   07/13/24 0030 -- -- -- 90 -- 97 %   07/13/24 0025 -- -- -- 89 -- 96 %   07/13/24 0020 -- -- -- 88 -- 97 %   07/13/24 0015 -- -- -- 90 -- 97 %   07/13/24 0010 -- -- -- 89 -- 97 %   07/13/24 0005 -- -- -- 89 -- 97 %   07/13/24 0000 -- -- -- 88 -- 97 %   07/12/24 2355 -- -- -- 90 -- 95 %   07/12/24 2350 -- -- -- 88 -- 96 %   07/12/24 2345 -- -- -- 89 -- 97 %   07/12/24 2340 138/82 -- -- 88 -- 97 %   07/12/24 2335 -- -- -- -- -- 97 %   07/12/24 2330 -- -- -- --  -- 96 %   07/12/24 2325 -- -- -- -- -- 97 %   07/12/24 2320 -- -- -- -- -- 97 %   07/12/24 2315 -- -- -- -- -- 97 %   07/12/24 2310 -- -- -- -- -- 96 %   07/12/24 2305 -- -- -- -- -- 97 %   07/12/24 2300 -- -- -- -- -- 96 %   07/12/24 1845 (!) 140/86 -- -- -- -- 97 %   07/12/24 1757 129/77 98.3  F (36.8  C) Oral 96 20 98 %     General Appearance: in no apparent distress. Fatigued (postictal)appearing  Skin: normal, dry  Heart: Well perfused  Lungs: Nonlabored resps on RA  Abdomen: soft, obese, incision c/d/I, no signs of infection.    : Voiding  Extremities: Cira independently, 5/5 strength.   Neuro:awake, alert, and oriented x3 Tremor absent.    Frailty Scores          4/23/2024 2/4/2024 2/14/2023   Frailty Scores   Final Score Not Frail Not Frail Not Frail   Final Score Number 0 0 1      Details                   Data:   CMP  Recent Labs   Lab 07/13/24  1156 07/13/24  0536 07/12/24  1840 07/11/24  0604 07/10/24  0538 07/09/24  2202   NA  --  136 135 133*   < > 135   POTASSIUM  --  4.2 4.1 4.1   < > 3.6   CHLORIDE  --  103 102 101   < > 99   CO2  --  23 23 24   < > 24   * 107* 134* 118*   < > 141*   BUN  --  11.8 13.3 9.5   < > 12.7   CR  --  0.57 0.68 0.59   < > 0.60   GFRESTIMATED  --  >90 >90 >90   < > >90   ARIEL  --  8.5* 8.5* 8.4*   < > 8.0*   MAG  --  1.6*  --  1.8   < > 1.4*   PHOS  --  3.8  --  2.4*   < > 2.6   LIPASE  --   --   --   --   --  44   ALBUMIN  --  3.3* 3.4* 3.3*   < > 3.4*   BILITOTAL  --  0.7 0.7 0.8   < > 0.8   ALKPHOS  --  144 149 136   < > 139   AST  --  18 19 16   < > 24   ALT  --  18 21 23   < > 28    < > = values in this interval not displayed.     CBC  Recent Labs   Lab 07/13/24  0536 07/12/24  1840   HGB 7.4* 7.9*   WBC 8.3 9.0    405     COAGS  Recent Labs   Lab 07/12/24  1840 07/09/24  2202   INR 1.08 1.02   PTT  --  27      Urinalysis  Recent Labs   Lab Test 07/09/24  2252 07/04/24  0059   COLOR Yellow Yellow   APPEARANCE Slightly Cloudy* Clear   URINEGLC Negative  Negative   URINEBILI Negative Negative   URINEKETONE Negative Negative   SG 1.010 1.021   UBLD Negative Negative   URINEPH 8.0* 6.0   PROTEIN Negative 30*   NITRITE Negative Negative   LEUKEST Small* Small*   RBCU 3* 1   WBCU 4 14*     Virology:  Hepatitis C Antibody   Date Value Ref Range Status   06/20/2024 Nonreactive Nonreactive Final     Comment:     A nonreactive screening test result does not exclude the possibility of exposure to or infection with HCV. Nonreactive screening test results in individuals with prior exposure to HCV may be due to antibody levels below the limit of detection of this assay or lack of reactivity to the HCV antigens used in this assay. Patients with recent HCV infections (<3 months from time of exposure) may have false-negative HCV antibody results due to the time needed for seroconversion (average of 8 to 9 weeks).   05/20/2014 Negative NEG Final

## 2024-07-13 NOTE — PLAN OF CARE
[id number]  Problem: Adult Inpatient Plan of Care  Goal: Plan of Care Review  Description: The Plan of Care Review/Shift note should be completed every shift.  The Outcome Evaluation is a brief statement about your assessment that the patient is improving, declining, or no change.  This information will be displayed automatically on your shift  note.  Outcome: Progressing     Problem: Seizure, Active Management  Goal: Absence of Seizure/Seizure-Related Injury  Outcome: Progressing  Intervention: Prevent Seizure-Related Injury  Recent Flowsheet Documentation  Taken 7/13/2024 0000 by John Connor RN  Seizure Precautions:   clutter-free environment maintained   side rails padded   Goal Outcome Evaluation:    Plan of Care Reviewed With: patient     Overall Patient Progress: Patient remains A&Ox4, RA, SPO2 >95% thus far. Vital signs remain stable and consistent. No seizure episode noted or reported thus far tonight.      Outcome Evaluation: Seizure precautions in place, continuous EEG ongoing.

## 2024-07-14 ENCOUNTER — APPOINTMENT (OUTPATIENT)
Dept: NEUROLOGY | Facility: CLINIC | Age: 59
DRG: 101 | End: 2024-07-14
Payer: MEDICARE

## 2024-07-14 LAB
ALBUMIN SERPL BCG-MCNC: 3.1 G/DL (ref 3.5–5.2)
ALP SERPL-CCNC: 173 U/L (ref 40–150)
ALT SERPL W P-5'-P-CCNC: 23 U/L (ref 0–50)
ANION GAP SERPL CALCULATED.3IONS-SCNC: 9 MMOL/L (ref 7–15)
AST SERPL W P-5'-P-CCNC: 23 U/L (ref 0–45)
BILIRUB DIRECT SERPL-MCNC: 0.37 MG/DL (ref 0–0.3)
BILIRUB SERPL-MCNC: 0.9 MG/DL
BUN SERPL-MCNC: 10.1 MG/DL (ref 8–23)
CALCIUM SERPL-MCNC: 8.4 MG/DL (ref 8.8–10.2)
CHLORIDE SERPL-SCNC: 102 MMOL/L (ref 98–107)
CREAT SERPL-MCNC: 0.56 MG/DL (ref 0.51–0.95)
DEPRECATED HCO3 PLAS-SCNC: 22 MMOL/L (ref 22–29)
EGFRCR SERPLBLD CKD-EPI 2021: >90 ML/MIN/1.73M2
ERYTHROCYTE [DISTWIDTH] IN BLOOD BY AUTOMATED COUNT: 19 % (ref 10–15)
GLUCOSE SERPL-MCNC: 114 MG/DL (ref 70–99)
HCT VFR BLD AUTO: 23.7 % (ref 35–47)
HGB BLD-MCNC: 7.6 G/DL (ref 11.7–15.7)
MAGNESIUM SERPL-MCNC: 1.8 MG/DL (ref 1.7–2.3)
MCH RBC QN AUTO: 32.2 PG (ref 26.5–33)
MCHC RBC AUTO-ENTMCNC: 32.1 G/DL (ref 31.5–36.5)
MCV RBC AUTO: 100 FL (ref 78–100)
PHOSPHATE SERPL-MCNC: 3.7 MG/DL (ref 2.5–4.5)
PLATELET # BLD AUTO: 403 10E3/UL (ref 150–450)
POTASSIUM SERPL-SCNC: 4.6 MMOL/L (ref 3.4–5.3)
PROT SERPL-MCNC: 6.9 G/DL (ref 6.4–8.3)
RBC # BLD AUTO: 2.36 10E6/UL (ref 3.8–5.2)
SODIUM SERPL-SCNC: 133 MMOL/L (ref 135–145)
WBC # BLD AUTO: 7.6 10E3/UL (ref 4–11)

## 2024-07-14 PROCEDURE — 999N000248 HC STATISTIC IV INSERT WITH US BY RN

## 2024-07-14 PROCEDURE — 250N000012 HC RX MED GY IP 250 OP 636 PS 637: Performed by: NURSE PRACTITIONER

## 2024-07-14 PROCEDURE — 250N000013 HC RX MED GY IP 250 OP 250 PS 637

## 2024-07-14 PROCEDURE — 95714 VEEG EA 12-26 HR UNMNTR: CPT

## 2024-07-14 PROCEDURE — 83735 ASSAY OF MAGNESIUM: CPT

## 2024-07-14 PROCEDURE — 85027 COMPLETE CBC AUTOMATED: CPT

## 2024-07-14 PROCEDURE — 95720 EEG PHY/QHP EA INCR W/VEEG: CPT | Performed by: PSYCHIATRY & NEUROLOGY

## 2024-07-14 PROCEDURE — 80053 COMPREHEN METABOLIC PANEL: CPT

## 2024-07-14 PROCEDURE — 99232 SBSQ HOSP IP/OBS MODERATE 35: CPT | Mod: 24 | Performed by: STUDENT IN AN ORGANIZED HEALTH CARE EDUCATION/TRAINING PROGRAM

## 2024-07-14 PROCEDURE — 250N000012 HC RX MED GY IP 250 OP 636 PS 637

## 2024-07-14 PROCEDURE — 36415 COLL VENOUS BLD VENIPUNCTURE: CPT

## 2024-07-14 PROCEDURE — 120N000011 HC R&B TRANSPLANT UMMC

## 2024-07-14 PROCEDURE — 84100 ASSAY OF PHOSPHORUS: CPT

## 2024-07-14 RX ORDER — LORAZEPAM 2 MG/ML
2 INJECTION INTRAMUSCULAR
Status: DISCONTINUED | OUTPATIENT
Start: 2024-07-14 | End: 2024-07-16 | Stop reason: HOSPADM

## 2024-07-14 RX ORDER — LIDOCAINE 40 MG/G
CREAM TOPICAL
Status: DISCONTINUED | OUTPATIENT
Start: 2024-07-14 | End: 2024-07-16 | Stop reason: HOSPADM

## 2024-07-14 RX ADMIN — MYCOPHENOLATE MOFETIL 750 MG: 250 CAPSULE ORAL at 20:03

## 2024-07-14 RX ADMIN — VALGANCICLOVIR HYDROCHLORIDE 450 MG: 450 TABLET ORAL at 08:17

## 2024-07-14 RX ADMIN — ASPIRIN 81 MG CHEWABLE TABLET 81 MG: 81 TABLET CHEWABLE at 08:16

## 2024-07-14 RX ADMIN — LEVETIRACETAM 1000 MG: 500 TABLET, FILM COATED ORAL at 08:17

## 2024-07-14 RX ADMIN — URSODIOL 300 MG: 300 CAPSULE ORAL at 13:43

## 2024-07-14 RX ADMIN — CYCLOSPORINE 250 MG: 100 CAPSULE, LIQUID FILLED ORAL at 08:16

## 2024-07-14 RX ADMIN — PREDNISONE 5 MG: 5 TABLET ORAL at 08:17

## 2024-07-14 RX ADMIN — Medication 2 WAFER: at 08:16

## 2024-07-14 RX ADMIN — URSODIOL 300 MG: 300 CAPSULE ORAL at 08:17

## 2024-07-14 RX ADMIN — MAGNESIUM OXIDE TAB 400 MG (241.3 MG ELEMENTAL MG) 800 MG: 400 (241.3 MG) TAB at 08:16

## 2024-07-14 RX ADMIN — MAGNESIUM OXIDE TAB 400 MG (241.3 MG ELEMENTAL MG) 800 MG: 400 (241.3 MG) TAB at 20:03

## 2024-07-14 RX ADMIN — LEVETIRACETAM 1000 MG: 500 TABLET, FILM COATED ORAL at 20:03

## 2024-07-14 RX ADMIN — MYCOPHENOLATE MOFETIL 750 MG: 250 CAPSULE ORAL at 08:16

## 2024-07-14 RX ADMIN — PANTOPRAZOLE SODIUM 40 MG: 40 TABLET, DELAYED RELEASE ORAL at 08:16

## 2024-07-14 RX ADMIN — CYCLOSPORINE 250 MG: 100 CAPSULE, LIQUID FILLED ORAL at 18:39

## 2024-07-14 RX ADMIN — URSODIOL 300 MG: 300 CAPSULE ORAL at 20:03

## 2024-07-14 ASSESSMENT — ACTIVITIES OF DAILY LIVING (ADL)
ADLS_ACUITY_SCORE: 39
ADLS_ACUITY_SCORE: 35
ADLS_ACUITY_SCORE: 36
ADLS_ACUITY_SCORE: 35
ADLS_ACUITY_SCORE: 39
ADLS_ACUITY_SCORE: 36
ADLS_ACUITY_SCORE: 35
ADLS_ACUITY_SCORE: 36
ADLS_ACUITY_SCORE: 39
ADLS_ACUITY_SCORE: 35
ADLS_ACUITY_SCORE: 35
ADLS_ACUITY_SCORE: 39
ADLS_ACUITY_SCORE: 36
ADLS_ACUITY_SCORE: 36
ADLS_ACUITY_SCORE: 35
ADLS_ACUITY_SCORE: 36
ADLS_ACUITY_SCORE: 35
ADLS_ACUITY_SCORE: 35
ADLS_ACUITY_SCORE: 36

## 2024-07-14 NOTE — CONSULTS
Laboratory Medicine and Pathology  Blood Bank Laboratory Evaluation    Abiola Matute MRN# [id number]   YOB: 1965 Age: 59 year old       Abiola Matute is a 59 year old female with history of HCC in the setting of DUNN/EtOH abuse s/p DCD liver transplant w/ biliary stent placement on 6/22/24.  On 6/22 she had a negative RBC antibody screen and was transfused 4 RBC units as part of her liver transplant care.  On 6/23 she received one additional unit.  On 7/4/24 her RBC antibody screen became seropositive, and her HATTIE was positive with C3, indicating a newly formed RBC antibody.  From 7/2 onward she was steadily losing hemoglobin.  While this may have been due to hemodilution or other causes, we cannot rule out a delayed hemolytic reaction.  Her hemolysis labs from this time period are difficult to interpret as her total bilirubin and her LD were elevated, possibly as a residual effect from her surgery, or possibly the delayed hemolytic reaction caused hemolysis.    The blood bank will identify the antibody and issue antigen-negative, compatible RBC in the future.            Assessment and Recommendation:     Assessment:  A probably delayed hemolytic transfusion reaction occurred following exposure to foreign RBC antigens in transfused RBCs from 6/22 and 6/23.    Recommendation:  Transfuse as needed.    Laurie Cantor MD, PhD  Transfusion Medicine Attending  Medical Director, Blood Bank Laboratory  Pager [phone]

## 2024-07-14 NOTE — PLAN OF CARE
Goal Outcome Evaluation:      Plan of Care Reviewed With: patient, spouse    Overall Patient Progress: no changeOverall Patient Progress: no change       Shift:    V/S & Pain: VSS on RA. denies  Neuro: AOx4, calm and cooperative, patient still having staring/seizure episodes, EEG monitoring  Respiratory: Stable on RA, lung sounds clear bilaterally  Cardiac: VSS  Skin: Dry, peeling areas due to lessened edema  GI/: Voids spontaneously, BM X  Nutrition: Regular diet with good appetite, adequate PO intake, denies N/V    Activity: bedrest, up with 1 person assist to commode  Labs: monitoring, RN managed    Events this shift: seizure event this shift. Pt remains vitally stable on RA. Call light within reach, calls appropriately    Report given to 7A bedside nurse.

## 2024-07-14 NOTE — PLAN OF CARE
Admitted/transferred from:   Time of arrival on unit 2240  2 RN full  skin assessment completed by Susanna SIMENATL  Skin assessment finding: issues found Clamshell abdominal incision stapled, KRISTIAN, no drainage. Old JACEK site covered with Primapore, dressing CDI. Dry, peeling skin areas throughout body, most notably on flanks and abdomen (per pt/pt's spouse, 2/2 edema).    Interventions/actions: other Vanicream at bedside for dry skin, incision staples likely to be removed Monday 7/15. No interventions needed at this time.      Will continue to monitor.    Susanna Alaniz RN

## 2024-07-14 NOTE — PLAN OF CARE
Goal Outcome Evaluation:    Plan of Care Reviewed With: patient  Overall Patient Progress: improving  Outcome Evaluation: no seizure activity noted on higher dose of keppra    BP (!) 145/88 (BP Location: Right arm)   Pulse 87   Temp 98  F (36.7  C) (Oral)   Resp 16   LMP 05/31/2006   SpO2 97%     6922-4222. Slightly hypertensive, OVSS on RA, afebrile. Denies pain or nausea. Regular diet with good appetite. PIV SL. Toileting independently. Douglas removed this shift, steri strips in place. Patient remains on continuous EEG monitoring. No seizure activity during shift. Family is very supportive at bedside, and patient was able to rest during early part of shift. Possible discharge tomorrow pending seizure activity. Continue with plan of care and update team with any changes.

## 2024-07-14 NOTE — PROGRESS NOTES
[facility]  Neurology Consultation - Progress Note    Patient Name:  Abiola Matute  Date of Service:  July 14, 2024    Subjective:    No further events after dose of lorazepam 2 mg IV and increase of Keppra to 1,000 mg BID last night. Virginia reports that she is feeling better this morning. Hopeful to go home but wary of going home too soon and needing to return again.    Objective:    Vitals: BP (!) 142/82 (BP Location: Right arm)   Pulse 87   Temp 98.3  F (36.8  C) (Oral)   Resp 16   LMP 05/31/2006   SpO2 97%   General: laying in bed, not in acute distress  Head: normocephalic; EEG leads in place  Cardiac: no lower extremity edema  Neurologic:  Mental Status: Awake, alert, oriented to person, place, month, and situation. Follows one step commands on both sides of body. Naming and repetition intact. Spells SUPER accurately forwards and backwards.   Cranial Nerves: Visual fields full on confrontational testing, conjugate gaze with intact extraocular movements, upper and lower face symmetric at rest and with activation, tongue protrusion midline   Motor: No abnormal movements.  Moves all 4 limbs antigravity with 5/5 strength of elbow flexion/extension, hand , hip flexion, ankle dorsi/plantarflexion  Sensory: Intact to light touch x 4 limbs  Station/Gait: Not assessed today.    Pertinent Investigations:    I have personally reviewed most recent and pertinent labs, tests, and radiological images.     Assessment  Virginia Matute is a 59 year old right-handed female with history of Crohn's disease on infliximab and liver cirrhosis c/b HCC s/p liver transplant 6/22/2024 (current immunosuppression: mycophenolate, prednisone, and tacrolimus) who initially came to neurology attention 7/10/24 after presenting with two seizures the evening of 7/9/24; semiology started with right arm automatism followed by right head turning and then generalized tonic-clonic movements. Ms. Matute was  started on Keppra 750 mg BID and exam was fully intact; MRI showed trace subdural hemorrhage overlying the right occipital lobe which was not redemonstrated on follow up head CT. Due to immunocompromised status and fever 1 week prior, an LP was performed which was negative for meningitis/encephalitis panel and HSV 1 and 2. WBC 0, protein 49, glucose 68.    Ms. Matute's neurologic exam was at baseline so she was discharged on 7/11/24 with plan for continuing Keppra 750 mg BID and follow up in general neurology clinic.    She returned to the ER on 7/12/24 after several episodes of loss of awareness, potentially seizure, with interval return to baseline. CT Head w/o contrast did not reveal new intracranial pathology. She was connected to continuous video EEG monitoring and had multiple clinical events (consisting of loss of awareness, staring but unresponsive) that had electrographic correlate.    She has had no further clinical or electrographic events since lorazepam 2 mg IV on evening of 7/13 as well as increase of Keppra to 1,000 mg BID. On exam this morning, she is at neurologic baseline.    Recommendations:   - continue video EEG monitoring overnight; will reassess ongoing need for additional monitoring on morning of 7/15  - counseled patient and spouse Albino that if event of staring with unresponsiveness occurs, press event button on EEG and notify RN; would administer IV Ativan  - continue Keppra 1,000 mg BID  - appreciate ID involvement re: GPCs in clusters in broth alone; their assessment is likely contaminant, favor monitoring off of antibiotics    Neurology will continue to follow.    Thank you for involving Neurology in the care of Abiola Matute.  Please do not hesitate to call with questions/concerns (consult pager [phone]).      Patient was seen and discussed with Dr. Alonso.    Johan Valdez MD  Neurology Resident PGY-4

## 2024-07-14 NOTE — PLAN OF CARE
"/84 (BP Location: Right arm)   Pulse 90   Temp 98  F (36.7  C) (Oral)   Resp 16   LMP 05/31/2006   SpO2 95%     Shift: 1716-3993, up from ED ~2240  Isolation Status: None.  VS: VSS on RA, afebrile  Neuro: Aox4, able to make needs known. Absence seizures/\"staring spells\" x 5 (blank stares off into distance x 5-10 minutes; pt can see/hear everything but cannot respond until event passes) - continuous EEG monitoring in place. PRN IV ativan x 1 given.  Behaviors: Calm, cooperative, pleasant  BG: N/A  Labs/Imaging: AM labs drawn, results pending. Positive blood cultures 7/9 - Gram positive bacilli.  Respiratory: WDL room air  Cardiac: Tachycardic low 100s, otherwise WDL. Telemetry monitoring in place, SR.  Pain/Nausea: Denies  PRN: IV ativan (see MAR)  Diet: Regular diet  LDA: N/A  Infusion(s): N/A  GI/: Voiding spontaneously without difficulty, LBM 7/13 loose.  Skin: Clamshell abdominal incision stapled, KRISTIAN, no drainage. Generalized dry/peeling skin, pt's home Vanicream at bedside.  Mobility: SBA gait belt/walker, within arms reach at all times. Seizure precautions, side rails padded.  Events/Education: Transferred to  from ED ~2240, oriented to room/call light. Absence seizure activity witnessed x 1 upon ambulation to restroom, resolved in ~5 minutes.  Plan: Continue with plan of care and notify team with any changes.  "

## 2024-07-14 NOTE — PROGRESS NOTES
Transplant Surgery  Inpatient Daily Progress Note  07/14/2024    Assessment & Plan: Abiola Matute is a 59 year old female with history of HCC in the setting of Fatty liver/AUD s/p DCD liver transplant w/ biliary stent placement on 6/22/24 with Dr. Ball, osteopenia, LE cellulitis, Crohn's disease on Infliximab, obesity. Postop course complicated by multiple readmissions, 6/30-7/6 with septic shock  requiring ICU.  Infectious workup largely negative, antibiotics were stopped 7/3/24 and she was discharged 7/6/24. Readmitted 7/9/24-7/11/24 after witnessed grand mal seizure at home with post ictal state. She was discharged on Keppra. She represented 7/12 with seizures.        Graft function:  DCD liver transplant w/ biliary stent placement 6/22/24: POD 22  LFTs stable  -continue Joni TID, ASA 81mg daily    Immunosuppression management:    mg BID  Tac on hold due to seizures. Level 4.7 on 7/3  Start Cyclosporine 250mg BID on 7/13. Goal ~200  Pred 5 mg daily     Neuro:   Seizure  GPCs in CSF culture from broth:   Neurology consulted. EEG monitoring. On presentation: Loaded with IV Keppra 1G + IV lorazepam 2mg  Restart Keppra 750mg BID.-increased to 1G BID.    Brain MRI 7/10 with SD hemorrhage overlying Right occipital lobe. Repeat CToH 7/12 on admit stable.   -7/10 CNS studies :meningitis/encephalitis panel negative, +GPC in clusters in csf broth. Received vanco x1. ID felt to be a contaminant.   Hematology: No acute issues. Hb ~7-8. Transfuse for Hb < 7   Cardiorespiratory: Stable   GI/Nutrition: Regular diet as tolerated, aspiration precautions  Endocrine: No acute issues  Fluid/Electrolytes:CIV  Hypomagnesemia: PTA Mg 800 mg BID. Supplement PRN   : voiding  Infectious disease:   +Blood culture: 1of 2 GPB diptheroids. ID following. Likely a contaminant.   Afebrile, WBC stable.   GPCs in CSF culture from broth: 7/10 LP, CSF studies with GPC     Prophylaxis: DVT (low risk), seizure, fall  Disposition:   7A    DINESH/Fellow/Resident Provider: Tejal Gonzalez NP    Faculty: Haim Geronimo M.D.    __________________________________________________________________  Transplant History: Admitted 7/9/2024 for seizure.   The patient has a history of liver failure due to hepatocelluar carcinoma.    6/22/2024 (Liver), Postoperative day: 22     Interval History: History is obtained from the patient, electronic health record, and patient's family    Overnight events: another episode of seizure when transfer from ED to floor. Reports incontinent with episode. Ativan received.  Denies HA, neck pain/rigidity, no fever/chills, photophobia, lightheaded/dizziness. Denies nausea.     ROS:   A 10-point review of systems was negative except as noted above.    Curent Meds:  Current Facility-Administered Medications   Medication Dose Route Frequency Provider Last Rate Last Admin    aspirin (ASA) chewable tablet 81 mg  81 mg Oral or Feeding Tube Daily Ed Benítez MD   81 mg at 07/13/24 0753    cycloSPORINE modified (GENERIC EQUIVALENT) capsule 250 mg  250 mg Oral BID IS Tejal Gonzalez NP   250 mg at 07/13/24 1823    levETIRAcetam (KEPPRA) tablet 1,000 mg  1,000 mg Oral BID Joyce Finney MD   1,000 mg at 07/13/24 2025    magnesium oxide (MAG-OX) tablet 800 mg  800 mg Oral BID Ed Benítez MD   800 mg at 07/13/24 2026    mycophenolate (GENERIC EQUIVALENT) capsule 750 mg  750 mg Oral BID Ed Benítez MD   750 mg at 07/13/24 2026    pantoprazole (PROTONIX) EC tablet 40 mg  40 mg Oral Daily Ed Benítez MD   40 mg at 07/13/24 0753    predniSONE (DELTASONE) tablet 5 mg  5 mg Oral or Feeding Tube Daily Ed Benítez MD   5 mg at 07/13/24 0754    psyllium (METAMUCIL) wafer 2 Wafer  2 Wafer Oral Daily Ed Benítez MD        sodium chloride (PF) 0.9% PF flush 3 mL  3 mL Intracatheter Q8H Samanta Butler MD        ursodiol (ACTIGALL) capsule 300 mg  300 mg Oral or Feeding Tube TID Ed Benítez MD   300 mg at 07/13/24 2026     valGANciclovir (VALCYTE) tablet 450 mg  450 mg Oral Daily Ed Benítez MD   450 mg at 07/13/24 0754       Physical Exam:     Admit      Current Vitals:   /84 (BP Location: Right arm)   Pulse 90   Temp 98  F (36.7  C) (Oral)   Resp 16   LMP 05/31/2006   SpO2 95%          Vital sign ranges:    Temp:  [98  F (36.7  C)-99.2  F (37.3  C)] 98  F (36.7  C)  Pulse:  [] 90  Resp:  [16] 16  BP: (129-143)/(71-84) 135/84  SpO2:  [95 %-98 %] 95 %  Patient Vitals for the past 24 hrs:   BP Temp Temp src Pulse Resp SpO2   07/14/24 0616 135/84 98  F (36.7  C) Oral 90 16 95 %   07/13/24 2018 129/71 99.2  F (37.3  C) Oral 99 -- 95 %   07/13/24 1600 133/82 -- -- 94 -- 96 %   07/13/24 1530 134/80 -- -- 94 -- 95 %   07/13/24 1430 (!) 143/78 -- -- 95 -- 97 %   07/13/24 1344 134/79 -- -- 101 -- 98 %   07/13/24 1039 138/78 -- -- 93 -- 96 %   07/13/24 0800 136/82 -- -- 96 -- 95 %     General Appearance: in no apparent distress. Fatigued (postictal)appearing  Skin: normal, dry  Heart: Well perfused  Lungs: Nonlabored resps on RA  Abdomen: soft, obese, incision c/d/I, no signs of infection.  Staples in place-remove today. Suture to [id number]  : Voiding  Extremities: Cira independently, 5/5 strength.   Neuro:awake, alert, and oriented x3 Tremor absent.    Frailty Scores          4/23/2024 2/4/2024 2/14/2023   Frailty Scores   Final Score Not Frail Not Frail Not Frail   Final Score Number 0 0 1      Details                   Data:   CMP  Recent Labs   Lab 07/14/24  0620 07/13/24  1156 07/13/24  0536 07/10/24  0538 07/09/24 2202   *  --  136   < > 135   POTASSIUM 4.6  --  4.2   < > 3.6   CHLORIDE 102  --  103   < > 99   CO2 22  --  23   < > 24   * 122* 107*   < > 141*   BUN 10.1  --  11.8   < > 12.7   CR 0.56  --  0.57   < > 0.60   GFRESTIMATED >90  --  >90   < > >90   ARIEL 8.4*  --  8.5*   < > 8.0*   MAG 1.8  --  1.6*   < > 1.4*   PHOS 3.7  --  3.8   < > 2.6   LIPASE  --   --   --   --  44   ALBUMIN 3.1*   --  3.3*   < > 3.4*   BILITOTAL 0.9  --  0.7   < > 0.8   ALKPHOS 173*  --  144   < > 139   AST 23  --  18   < > 24   ALT 23  --  18   < > 28    < > = values in this interval not displayed.     CBC  Recent Labs   Lab 07/14/24  0620 07/13/24  0536   HGB 7.6* 7.4*   WBC 7.6 8.3    373     COAGS  Recent Labs   Lab 07/12/24  1840 07/09/24  2202   INR 1.08 1.02   PTT  --  27      Urinalysis  Recent Labs   Lab Test 07/09/24  2252 07/04/24  0059   COLOR Yellow Yellow   APPEARANCE Slightly Cloudy* Clear   URINEGLC Negative Negative   URINEBILI Negative Negative   URINEKETONE Negative Negative   SG 1.010 1.021   UBLD Negative Negative   URINEPH 8.0* 6.0   PROTEIN Negative 30*   NITRITE Negative Negative   LEUKEST Small* Small*   RBCU 3* 1   WBCU 4 14*     Virology:  Hepatitis C Antibody   Date Value Ref Range Status   06/20/2024 Nonreactive Nonreactive Final     Comment:     A nonreactive screening test result does not exclude the possibility of exposure to or infection with HCV. Nonreactive screening test results in individuals with prior exposure to HCV may be due to antibody levels below the limit of detection of this assay or lack of reactivity to the HCV antigens used in this assay. Patients with recent HCV infections (<3 months from time of exposure) may have false-negative HCV antibody results due to the time needed for seroconversion (average of 8 to 9 weeks).   05/20/2014 Negative NEG Final

## 2024-07-15 ENCOUNTER — TELEPHONE (OUTPATIENT)
Dept: FAMILY MEDICINE | Facility: CLINIC | Age: 59
End: 2024-07-15

## 2024-07-15 ENCOUNTER — APPOINTMENT (OUTPATIENT)
Dept: NEUROLOGY | Facility: CLINIC | Age: 59
DRG: 101 | End: 2024-07-15
Payer: MEDICARE

## 2024-07-15 ENCOUNTER — TELEPHONE (OUTPATIENT)
Dept: TRANSPLANT | Facility: CLINIC | Age: 59
End: 2024-07-15

## 2024-07-15 LAB
ALBUMIN SERPL BCG-MCNC: 3.3 G/DL (ref 3.5–5.2)
ALP SERPL-CCNC: 173 U/L (ref 40–150)
ALT SERPL W P-5'-P-CCNC: 22 U/L (ref 0–50)
ANION GAP SERPL CALCULATED.3IONS-SCNC: 10 MMOL/L (ref 7–15)
AST SERPL W P-5'-P-CCNC: 19 U/L (ref 0–45)
BACTERIA BLD CULT: NO GROWTH
BACTERIA CSF CULT: ABNORMAL
BILIRUB DIRECT SERPL-MCNC: 0.34 MG/DL (ref 0–0.3)
BILIRUB SERPL-MCNC: 0.8 MG/DL
BUN SERPL-MCNC: 12.9 MG/DL (ref 8–23)
CALCIUM SERPL-MCNC: 8.6 MG/DL (ref 8.8–10.2)
CHLORIDE SERPL-SCNC: 101 MMOL/L (ref 98–107)
CREAT SERPL-MCNC: 0.59 MG/DL (ref 0.51–0.95)
CYCLOSPORINE BLD LC/MS/MS-MCNC: 118 UG/L (ref 50–400)
DEPRECATED HCO3 PLAS-SCNC: 21 MMOL/L (ref 22–29)
EGFRCR SERPLBLD CKD-EPI 2021: >90 ML/MIN/1.73M2
ERYTHROCYTE [DISTWIDTH] IN BLOOD BY AUTOMATED COUNT: 18.6 % (ref 10–15)
GLUCOSE SERPL-MCNC: 126 MG/DL (ref 70–99)
GRAM STAIN RESULT: ABNORMAL
GRAM STAIN RESULT: ABNORMAL
HCT VFR BLD AUTO: 25.4 % (ref 35–47)
HGB BLD-MCNC: 7.9 G/DL (ref 11.7–15.7)
MAGNESIUM SERPL-MCNC: 1.6 MG/DL (ref 1.7–2.3)
MCH RBC QN AUTO: 31.9 PG (ref 26.5–33)
MCHC RBC AUTO-ENTMCNC: 31.1 G/DL (ref 31.5–36.5)
MCV RBC AUTO: 102 FL (ref 78–100)
PHOSPHATE SERPL-MCNC: 3.6 MG/DL (ref 2.5–4.5)
PLATELET # BLD AUTO: 411 10E3/UL (ref 150–450)
POTASSIUM SERPL-SCNC: 4.4 MMOL/L (ref 3.4–5.3)
PROT SERPL-MCNC: 7.2 G/DL (ref 6.4–8.3)
RBC # BLD AUTO: 2.48 10E6/UL (ref 3.8–5.2)
SODIUM SERPL-SCNC: 132 MMOL/L (ref 135–145)
TME LAST DOSE: NORMAL H
TME LAST DOSE: NORMAL H
WBC # BLD AUTO: 7.8 10E3/UL (ref 4–11)

## 2024-07-15 PROCEDURE — 120N000011 HC R&B TRANSPLANT UMMC

## 2024-07-15 PROCEDURE — 250N000012 HC RX MED GY IP 250 OP 636 PS 637

## 2024-07-15 PROCEDURE — 95711 VEEG 2-12 HR UNMONITORED: CPT

## 2024-07-15 PROCEDURE — 80053 COMPREHEN METABOLIC PANEL: CPT

## 2024-07-15 PROCEDURE — 83735 ASSAY OF MAGNESIUM: CPT

## 2024-07-15 PROCEDURE — 85027 COMPLETE CBC AUTOMATED: CPT

## 2024-07-15 PROCEDURE — 82248 BILIRUBIN DIRECT: CPT

## 2024-07-15 PROCEDURE — 95718 EEG PHYS/QHP 2-12 HR W/VEEG: CPT | Performed by: PSYCHIATRY & NEUROLOGY

## 2024-07-15 PROCEDURE — 36415 COLL VENOUS BLD VENIPUNCTURE: CPT | Performed by: NURSE PRACTITIONER

## 2024-07-15 PROCEDURE — 250N000012 HC RX MED GY IP 250 OP 636 PS 637: Performed by: NURSE PRACTITIONER

## 2024-07-15 PROCEDURE — 99232 SBSQ HOSP IP/OBS MODERATE 35: CPT | Mod: 24 | Performed by: PHYSICIAN ASSISTANT

## 2024-07-15 PROCEDURE — 250N000013 HC RX MED GY IP 250 OP 250 PS 637

## 2024-07-15 PROCEDURE — 80158 DRUG ASSAY CYCLOSPORINE: CPT | Performed by: NURSE PRACTITIONER

## 2024-07-15 PROCEDURE — 99231 SBSQ HOSP IP/OBS SF/LOW 25: CPT | Mod: 24 | Performed by: STUDENT IN AN ORGANIZED HEALTH CARE EDUCATION/TRAINING PROGRAM

## 2024-07-15 PROCEDURE — 99233 SBSQ HOSP IP/OBS HIGH 50: CPT | Mod: 24 | Performed by: INTERNAL MEDICINE

## 2024-07-15 PROCEDURE — 250N000013 HC RX MED GY IP 250 OP 250 PS 637: Performed by: PHYSICIAN ASSISTANT

## 2024-07-15 PROCEDURE — 84100 ASSAY OF PHOSPHORUS: CPT

## 2024-07-15 RX ORDER — CYCLOSPORINE 50 MG/1
250 CAPSULE, LIQUID FILLED ORAL 2 TIMES DAILY
Qty: 300 CAPSULE | Refills: 11 | Status: ACTIVE | OUTPATIENT
Start: 2024-07-15 | End: 2024-07-16

## 2024-07-15 RX ADMIN — URSODIOL 300 MG: 300 CAPSULE ORAL at 21:56

## 2024-07-15 RX ADMIN — URSODIOL 300 MG: 300 CAPSULE ORAL at 07:48

## 2024-07-15 RX ADMIN — MYCOPHENOLATE MOFETIL 750 MG: 250 CAPSULE ORAL at 07:47

## 2024-07-15 RX ADMIN — CYCLOSPORINE 250 MG: 100 CAPSULE, LIQUID FILLED ORAL at 17:47

## 2024-07-15 RX ADMIN — MAGNESIUM OXIDE TAB 400 MG (241.3 MG ELEMENTAL MG) 800 MG: 400 (241.3 MG) TAB at 07:47

## 2024-07-15 RX ADMIN — PREDNISONE 5 MG: 5 TABLET ORAL at 07:47

## 2024-07-15 RX ADMIN — LEVETIRACETAM 1000 MG: 500 TABLET, FILM COATED ORAL at 20:35

## 2024-07-15 RX ADMIN — LEVETIRACETAM 1000 MG: 500 TABLET, FILM COATED ORAL at 07:48

## 2024-07-15 RX ADMIN — Medication 50 MG: at 23:09

## 2024-07-15 RX ADMIN — Medication 5 MG: at 21:55

## 2024-07-15 RX ADMIN — ASPIRIN 81 MG CHEWABLE TABLET 81 MG: 81 TABLET CHEWABLE at 07:46

## 2024-07-15 RX ADMIN — VALGANCICLOVIR HYDROCHLORIDE 450 MG: 450 TABLET ORAL at 07:48

## 2024-07-15 RX ADMIN — MAGNESIUM OXIDE TAB 400 MG (241.3 MG ELEMENTAL MG) 800 MG: 400 (241.3 MG) TAB at 21:55

## 2024-07-15 RX ADMIN — PANTOPRAZOLE SODIUM 40 MG: 40 TABLET, DELAYED RELEASE ORAL at 07:47

## 2024-07-15 RX ADMIN — MYCOPHENOLATE MOFETIL 750 MG: 250 CAPSULE ORAL at 20:35

## 2024-07-15 RX ADMIN — CYCLOSPORINE 250 MG: 100 CAPSULE, LIQUID FILLED ORAL at 08:41

## 2024-07-15 RX ADMIN — URSODIOL 300 MG: 300 CAPSULE ORAL at 16:07

## 2024-07-15 ASSESSMENT — ACTIVITIES OF DAILY LIVING (ADL)
ADLS_ACUITY_SCORE: 35
ADLS_ACUITY_SCORE: 34
ADLS_ACUITY_SCORE: 35
ADLS_ACUITY_SCORE: 34
ADLS_ACUITY_SCORE: 35
ADLS_ACUITY_SCORE: 34

## 2024-07-15 NOTE — PROGRESS NOTES
Transplant Surgery  Inpatient Daily Progress Note  07/15/2024    Assessment & Plan: Abiola Matute is a 59 year old female with history of HCC in the setting of Fatty liver/AUD s/p DCD liver transplant w/ biliary stent placement on 6/22/24 with Dr. Ball, osteopenia, LE cellulitis, Crohn's disease on Infliximab, obesity. Postop course complicated by multiple readmissions, 6/30-7/6 with septic shock  requiring ICU.  Infectious workup largely negative, antibiotics were stopped 7/3/24 and she was discharged 7/6/24. Readmitted 7/9/24-7/11/24 after witnessed grand mal seizure at home with post ictal state. She was discharged on Keppra. She represented 7/12 with seizures.      Graft function:  DCD liver transplant w/ biliary stent placement 6/22/24: POD 23  LFTs stable  -continue Joni TID, ASA 81mg daily    Immunosuppression management:    mg BID  Tac stopped due to seizures. Level 4.7 on 7/3  Start Cyclosporine 250mg BID on 7/13. Goal ~200  Pred 5 mg daily     Neuro:   Seizure  GPCs in CSF culture from broth:   Neurology consulted. EEG monitoring. On presentation: Loaded with IV Keppra 1G + IV lorazepam 2mg  Restart Keppra 750mg BID.-increased to 1G BID.    Brain MRI 7/10 with SD hemorrhage overlying Right occipital lobe. Repeat CToH 7/12 on admit stable.   -7/10 CNS studies :meningitis/encephalitis panel negative, +GPC in clusters in csf broth. Received vanco x1. ID felt to be a contaminant.   Hematology: No acute issues. Hb ~7-8. Transfuse for Hb < 7   Cardiorespiratory: Stable   GI/Nutrition: Regular diet as tolerated, aspiration precautions  Endocrine: No acute issues  Fluid/Electrolytes:CIV  Hypomagnesemia: PTA Mg 800 mg BID. Supplement PRN   : voiding  Infectious disease:   +Blood culture: 1of 2 GPB diptheroids. ID following. Likely a contaminant.   Afebrile, WBC stable.   GPCs in CSF culture from broth: 7/10 LP, CSF studies with GPC     Prophylaxis: DVT (low risk), seizure, fall  Disposition: 7A,  planning for possible discharge 7/16    DINESH/Fellow/Resident Provider: Abiola Rowland PA-C [id number]    Faculty: Haim Geronimo M.D.    __________________________________________________________________  Transplant History: Admitted 7/9/2024 for seizure.   The patient has a history of liver failure due to hepatocelluar carcinoma.    6/22/2024 (Liver), Postoperative day: 23     Interval History: History is obtained from the patient, electronic health record, and patient's family    Overnight events: No seizure activity since Saturday. Denies HA, neck pain/rigidity, no fever/chills, photophobia, lightheaded/dizziness. Denies nausea.     ROS:   A 10-point review of systems was negative except as noted above.    Curent Meds:  Current Facility-Administered Medications   Medication Dose Route Frequency Provider Last Rate Last Admin    aspirin (ASA) chewable tablet 81 mg  81 mg Oral or Feeding Tube Daily Ed Benítez MD   81 mg at 07/15/24 0746    cycloSPORINE modified (GENERIC EQUIVALENT) capsule 250 mg  250 mg Oral BID IS Tejal Gonzalez NP   250 mg at 07/15/24 0841    levETIRAcetam (KEPPRA) tablet 1,000 mg  1,000 mg Oral BID Joyce Finney MD   1,000 mg at 07/15/24 0748    magnesium oxide (MAG-OX) tablet 800 mg  800 mg Oral BID Ed Benítez MD   800 mg at 07/15/24 0747    mycophenolate (GENERIC EQUIVALENT) capsule 750 mg  750 mg Oral BID Ed Benítez MD   750 mg at 07/15/24 0747    pantoprazole (PROTONIX) EC tablet 40 mg  40 mg Oral Daily Ed Benítez MD   40 mg at 07/15/24 0747    predniSONE (DELTASONE) tablet 5 mg  5 mg Oral or Feeding Tube Daily Ed Benítez MD   5 mg at 07/15/24 0747    psyllium (METAMUCIL) wafer 2 Wafer  2 Wafer Oral Daily Ed Benítez MD   2 Wafer at 07/14/24 0816    sodium chloride (PF) 0.9% PF flush 3 mL  3 mL Intracatheter Q8H Samanta Butler MD   3 mL at 07/15/24 0845    ursodiol (ACTIGALL) capsule 300 mg  300 mg Oral or Feeding Tube TID Ed Benítez MD   300 mg at 07/15/24  0748    valGANciclovir (VALCYTE) tablet 450 mg  450 mg Oral Daily Ed Benítez MD   450 mg at 07/15/24 0748       Physical Exam:     Admit      Current Vitals:   BP (!) 151/83 (BP Location: Left arm)   Pulse 89   Temp 98.6  F (37  C) (Oral)   Resp 18   Wt 78.6 kg (173 lb 4.5 oz)   LMP 05/31/2006   SpO2 96%   BMI 28.84 kg/m           Vital sign ranges:    Temp:  [98  F (36.7  C)-98.6  F (37  C)] 98.6  F (37  C)  Pulse:  [86-97] 89  Resp:  [16-18] 18  BP: (136-151)/(82-89) 151/83  SpO2:  [95 %-98 %] 96 %  Patient Vitals for the past 24 hrs:   BP Temp Temp src Pulse Resp SpO2 Weight   07/15/24 1407 (!) 151/83 98.6  F (37  C) Oral -- 18 -- --   07/15/24 0859 (!) 141/82 98  F (36.7  C) Oral -- 18 -- --   07/15/24 0602 -- -- -- 89 -- 96 % --   07/15/24 0542 (!) 141/89 98.2  F (36.8  C) Oral 95 18 98 % --   07/15/24 0515 -- -- -- 97 -- -- --   07/15/24 0500 -- -- -- 91 -- -- --   07/15/24 0448 -- -- -- 88 -- -- 78.6 kg (173 lb 4.5 oz)   07/15/24 0300 -- -- -- 90 -- 95 % --   07/15/24 0235 136/83 98.2  F (36.8  C) Oral 90 16 96 % --   07/15/24 0100 -- -- -- 86 -- 95 % --   07/15/24 0045 -- -- -- 88 -- 96 % --   07/14/24 2129 -- 98.6  F (37  C) Oral -- 16 -- --   07/14/24 1704 (!) 145/88 98  F (36.7  C) Oral -- 16 97 % --     General Appearance: in no apparent distress.  Skin: normal, dry  Heart: Well perfused  Lungs: Nonlabored resps on RA  Abdomen: Nontender  : Voiding  Extremities: Cira independently, 5/5 strength.   Neuro:awake, alert, and oriented x3 Tremor absent.    Frailty Scores          4/23/2024 2/4/2024 2/14/2023   Frailty Scores   Final Score Not Frail Not Frail Not Frail   Final Score Number 0 0 1      Details                   Data:   CMP  Recent Labs   Lab 07/15/24  0618 07/14/24  0620 07/10/24  0538 07/09/24  2202   * 133*   < > 135   POTASSIUM 4.4 4.6   < > 3.6   CHLORIDE 101 102   < > 99   CO2 21* 22   < > 24   * 114*   < > 141*   BUN 12.9 10.1   < > 12.7   CR 0.59 0.56   < > 0.60    GFRESTIMATED >90 >90   < > >90   ARIEL 8.6* 8.4*   < > 8.0*   MAG 1.6* 1.8   < > 1.4*   PHOS 3.6 3.7   < > 2.6   LIPASE  --   --   --  44   ALBUMIN 3.3* 3.1*   < > 3.4*   BILITOTAL 0.8 0.9   < > 0.8   ALKPHOS 173* 173*   < > 139   AST 19 23   < > 24   ALT 22 23   < > 28    < > = values in this interval not displayed.     CBC  Recent Labs   Lab 07/15/24  0618 07/14/24  0620   HGB 7.9* 7.6*   WBC 7.8 7.6    403     COAGS  Recent Labs   Lab 07/12/24  1840 07/09/24  2202   INR 1.08 1.02   PTT  --  27      Urinalysis  Recent Labs   Lab Test 07/09/24  2252 07/04/24  0059   COLOR Yellow Yellow   APPEARANCE Slightly Cloudy* Clear   URINEGLC Negative Negative   URINEBILI Negative Negative   URINEKETONE Negative Negative   SG 1.010 1.021   UBLD Negative Negative   URINEPH 8.0* 6.0   PROTEIN Negative 30*   NITRITE Negative Negative   LEUKEST Small* Small*   RBCU 3* 1   WBCU 4 14*     Virology:  Hepatitis C Antibody   Date Value Ref Range Status   06/20/2024 Nonreactive Nonreactive Final     Comment:     A nonreactive screening test result does not exclude the possibility of exposure to or infection with HCV. Nonreactive screening test results in individuals with prior exposure to HCV may be due to antibody levels below the limit of detection of this assay or lack of reactivity to the HCV antigens used in this assay. Patients with recent HCV infections (<3 months from time of exposure) may have false-negative HCV antibody results due to the time needed for seroconversion (average of 8 to 9 weeks).   05/20/2014 Negative NEG Final

## 2024-07-15 NOTE — TELEPHONE ENCOUNTER
Forms/Letter Request    Type of form/letter: Nursing Home/Assisted Living Orders  Orders from [facility]  # [id number]  Do we have the form/letter: Yes: given to AVA Macdonald    Who is the form from? Home care    Where did/will the form come from? form was faxed in    When is form/letter needed by: when available    How would you like the form/letter returned: Fax : [phone]

## 2024-07-15 NOTE — PLAN OF CARE
Goal Outcome Evaluation:      Plan of Care Reviewed With: patient, spouse    Overall Patient Progress: improvingOverall Patient Progress: improving     Shift: [id number]  Isolation Status: none  VS: VSS on RA  Neuro: Aox4, EEG was in place most of shift, discontinued around 1400.   Behaviors: calm, reports feeling tired  BG: n/a  Labs: No critical labs, nothing replaced.   Respiratory: WDL  Cardiac: WDL, on telemetry, sinus rhythm  Pain/Nausea:  Denies  PRN: none given  Diet: Reg tolerated well  IV Access: 1 PIV L SL  Lines/Drains: none  GI/: voids spont, x1 BM this shift  Skin: clamshell abd incision w steri strips in place, KRISTIAN w no drainage. Generalized dry/peeling skin, pt home vanicream at bedside used to manage.   Mobility: SBA w gait belt, within arms reach at all times in event of seizure episode. Seizure precautions in place: side rails of bed padded, bedside attendant at bedside most of shift but discontinued around 1400 .   Events/Education: Discharging tomorrow, pt has been readmitted multiple times previously after complications so team is looking to monitor overnight to be sure pt is ready to go home.  preparing to stay overnight w pt. Spoke w pt and team about how a home med (containing melatonin and CBD) could interact with anti rejection therapy bc of the CBD. Pt concluded to only take melatonin at home, ok'ed by team. Pt hopes to take melatonin tonight requested order from team, verbal confirmation given order will be put in.   Plan: cont w/ POC

## 2024-07-15 NOTE — PROGRESS NOTES
[facility]  Transplant Infectious Disease Progress Note -- Sign Off     Patient:  Abiola Matute, Date of birth 1965, Medical record number [id number]  Date of Visit:  07/15/2024         Assessment and Recommendations:   Recommendations:  - The Micrococcus species isolation in broth alone from the 7/10/24 CSF culture is strongly suspected to be a contaminant,   - The diphtheroids isolated from the 7/9/24 blood culture are also highly likely a (different) culture contaminant. The other blood culture set from 7/9/24 remains negative.  - Would continue to monitor her off antibiotic therapy.  - Continue the Valcyte prophylaxis; no other antimicrobial therapy is indicated.  - Would not repeat blood cultures unless clinically indicated (by new fevers or such).    The case was discussed with the Transplant Surgery service today.  With the above Recommendations, Transplant Infectious Disease will sign off now.  Thanks for allowing us to participate in the care of this patient.  Please page me if additional ID questions or concerns arise.    Georgi Song MD  Pager [phone]    Assessment:    Infectious Disease issues include:    #Micrococcus species isolated in 7/10/24 CSF culture from broth:  #Seizure like episodes:  She had witnessed grand mal seizures x 2 7/9 for which she was started on Keppra and discharged home soon after. CT head normal, MRI with trace subdural hemorrhage without mass effect. Now admitted with more subtle absence seizure like episodes at home starting 7/12. During original admission, patient underwent LP - 0 RBCs, 0 WBCs, glucose normal (68), protein borderline elevated (48, ULN 45), negative HSV and VZV PCRs, negative meningitis panel. Now with GPCs in clusters isolated in broth alone that too on 3rd day of incubation, aerobic culture otherwise negative  Patient's clinical presentation is not consistent with meningitis or encephalitis (no fevers, neck  stiffness, photophobia, headache). Her imaging studies (multiple CTs and also a brain MRI) have shown no evidence or suspicion for infectious process. CSF studies near-pristine - no cells, normal glucose and near normal protein levels with a negative meningitis panel. In addition, no obvious focus of infection to spread to CNS - no evidence of infection elsewhere. Highly unlikely in this case that patient would then have bacterial meningitis/CNS infectious process with a normal CSF and no symptoms other than seizures, even if she were immunocompromised. Lastly, does not have history of CNS instrumentation or shunt. Favor observing off antibiotics    #Blood culture on 7/9/24 with diphtheroids:  1/2 blood cultures (1/4 bottles) from 7/9/24 positive on 3rd day of incubation for gram positive rods suggestive of diphtheroids. These are usual blood contaminants. Monitor off antibiotics    Prior ID issues:  #Leukocytosis/Fevers: 1 time fever to 101F during last admission, associated with leukocytosis to 32 on 7/1. Extensive ID workup negative including blood cultures x2 (6/30, 7/1), urine culture (7/1), C diff negative (6/30). CT A/P from 6/30/14 with a R pleural effusion, a 5.4 x1.5 cm seroma vs hematoma in R flank, 6.8 x 2.4 cm resolving hematoma around the transplanted liver, and small volume ascites in the pelvis. R-sided thoracentesis (7/3/24) consistent with a transudate with 362 WBC, which were 48% lymphocytes. Transplant surgery not concerned for an infection in her perihepatic fluid collection. CMV VL negative   #Eosinophilia: Exact etiology unclear. 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 Primary team holding dapsone, since this was a potential culprit    Transplant Checklist  - QTc interval: 456 msec 7/9/24  - Pneumocystis prophylaxis:  Initially on dapsone, but concern that this triggered SDRIFE syndrome so started on pentamidine 7/5/24.   - Bacterial prophylaxis: None  - Fungal prophylaxis: None  - Serostatus & viral prophylaxis: CMV D-/R+, HSV ?, EBV D+/R+ Valgancyclovir  - Immunization status:  Readdress at 3 months post-transplant  - Gamma globulin status: No lab results found.  - Antibiotic allergies: Hives with sulfa and pain/itching with metronidazole. Should have allergy consult in coming months to assess for ongoing sulfa allergy.    - Code status: Full Code        Interval History:   Ms. Remains consistently afebrile (T max 99.3 degrees F) since admission without evident chlls or sweats and with a steadily normal peripheral WBC in the 7 - 10K range (now 7.8 today) on only continuing Valcyte prophylaxis.  She received a single dose of IV vancomycin on 7/13/24 midday but has not been on any other antimicrobials since.  There have been no new events overnight.  She did have another seizure on 7/13/24 overnight but is now feeling better without current new EENT symptoms, dyspnea on room air, cough, chest pain, nausea, abdominal pain, diarrhea, rash, headache, or other new complaints today.  Per Neurology, he new EEG is now seizure free on levetiracetam and Ativan, versus previously when seizures were evident on EEG.  Her 7/10/24 lumbar puncture CSF was unremarkable with no WBCs or RBCs but a Micrococcus species was belatedly (at ~ 70 hours of incubation) isolated in broth culture only.  A single 7/9/24 peripherally drawn blood culture also grew a diphtheroid at 3.5+ days of incubation with the other 7/9/24 blood culture negative.  The 7/10/24 head CT showed a prior right posterior subdural hemorrhage without new changes.         History of Infectious Disease Illness (copied from the 7/13/24 Transplant ID Consult Note):   A 59 year old woman with history of Crohn's (on infliximab) and cirrhosis 2/2 DUNN/EtOH c/b HCC s/p DDLT with biliary  stent placement (6/22/2024). She was discharged on 6/28/24, re-admitted on 6/30/24 with fevers and patchy induration on RUE/axilla/flank, and then readmitted again between 7/9 - 7/11 for seizure. Now returns after discharge with seizure like episodes, ID consulted for positive CSF broth culture    Patient recently admitted after witnessed grand mal seizure on 7/9. In the ER, had similar episode, received IV benzo, subsequently started on Keppra. During hospitalization, CT Head negative. MRI Brain with trace subdural hemorrhage overlying the right occipital lobe without mass effect. LP 7/10, unremarkable and discharged home after no further episodes witnessed.     On 7/12, per , 4 separate witnessed episodes during the day which patient was staring off into space, not speaking or following commands, associated with some lip smacking movements. Usually lasting for 5-7 months, followed by improvement in mental status but at times, some somnolence after. Patient does not recall these episodes and is back to baseline mental status in between. Brought in - afebrile, stable vitals. CT head on admission with no acute abnormality, and resolution of previously seen subdural hemorrhage. At least 3-4 more witnessed episodes here    ID consulted as CSF from 7/10 with GPCs noted in broth alone. Patient was empirically started on IV Vancomycin    Patient denies febrile episodes over the last 2 weeks. No history of recent infection, no prior upper respiratory, sinus or ear symptoms. No cough, SOB. No headache, photophobia, neck stiffness. No confusion in between seizure like episodes. No skin rash, joint pain. No urinary symptoms. Of note, she has had epidural injections in the past, but during the birth of her children in the 90s, not since. No spinal or CNS instrumentation. No hardware. No known  shunt      Transplants:  6/22/2024 (Liver), Postoperative day:  23.  Coordinator Zayra Paulino    Exposure History    Demographics: Lived in [place] for 39 years. Previously lived in [place] (grew up there) and moved to [place] in [id number].   Travel: Did travel to [place] 4 years ago for vacation. Didn't go hiking or do something that stirred up soil. [place] in 2023. [place], [place], [place]. Did vacation. Didn't go hiking, but did beach vacations.   Occupation: Currently disabled.  at [employer].   Hobbies: Enjoys spending time with grandchildren. Likes to golf. Lies to go camping. Does some yard work. Last did this in 2nd week in June (mowing lawn).   Animals: None   Substances: None   TB Exposure: No known exposure to TB. Never been to an endemic country. No jail exposure. No homeless shelter exposure. No healthcare exposure.   Strongyloides Exposure: No high risk exposure  Coccidioides Exposure: No high risk exposure  Chagas Exposure: No high risk exposure     Review of Systems:  Remaining systems reviewed and negative    Past Medical History:   Diagnosis Date    Alcohol abuse     Alcoholic cirrhosis of liver with ascites     Anal fistula     Atypical ductal hyperplasia of breast 09/10/2010    ERT not recommended -left - and flat epithelial atypia-scheduled for breast biopsy 9/17/2010     Bifid uvula     Cholelithiasis     Contact perianal dermatitis and other eczema     recurrent - clobetasol     Crohn disease     Fear of flying      - gets Ativan prn.     HCC (hepatocellular carcinoma) (H) 2/1/2023    Hypertension     IBS (irritable bowel syndrome)        Past Surgical History:   Procedure Laterality Date    BENCH LIVER  6/22/2024    Procedure: Bench liver;  Surgeon: Pravin Ball MD;  Location: [facility] OR    BIOPSY ANAL N/A 3/28/2019    anal biopsy and culure placement of seton - Dr Fleming    BIOPSY BREAST Left 09/17/2010    - scheduled with Dr. Varma     BIOPSY LIVER  2019    COLONOSCOPY  2006    COLONOSCOPY N/A 12/23/2014    Procedure: COMBINED COLONOSCOPY, SINGLE OR MULTIPLE  BIOPSY/POLYPECTOMY BY BIOPSY;  Surgeon: Diane Fleming MD;  Location:  GI    COLONOSCOPY N/A 12/5/2019    Procedure: COLONOSCOPY, WITH POLYPECTOMY AND BIOPSY;  Surgeon: Farhan Schilling MD;  Location: [facility] GI    COLONOSCOPY N/A 2/27/2024    Procedure: COLONOSCOPY, WITH POLYPECTOMY AND BIOPSY;  Surgeon: Michelle Diaz MD;  Location: [facility] OR    ENDOSCOPIC RETROGRADE CHOLANGIOPANCREATOGRAM N/A 4/24/2020    Procedure: ENDOSCOPIC RETROGRADE CHOLANGIOPANCREATOGRAPHY WITH, sledge removal,sphincterotomy, stent in gallbladder and pancreatic duct stent, and balloon dilation;  Surgeon: Guru Isac Kraft MD;  Location: [facility] OR    ESOPHAGOSCOPY, GASTROSCOPY, DUODENOSCOPY (EGD), COMBINED N/A 12/5/2019    Procedure: ESOPHAGOGASTRODUODENOSCOPY (EGD);  Surgeon: Farhan Schilling MD;  Location: [facility] GI    ESOPHAGOSCOPY, GASTROSCOPY, DUODENOSCOPY (EGD), COMBINED N/A 5/11/2023    Procedure: Esophagoscopy, gastroscopy, duodenoscopy (EGD), combined;  Surgeon: Jeannette Cervantes MD;  Location: [facility] GI    EXAM UNDER ANESTHESIA ANUS N/A 3/28/2019    Procedure: EXAM UNDER ANESTHESIA ANUS;  Surgeon: Diane Fleming MD;  Location:  OR    EXAM UNDER ANESTHESIA ANUS N/A 2/26/2021    Procedure: EXAM UNDER ANESTHESIA OF ANUS, SETON PLACEMENT, EXCISION OF SKIN BRIDGE;  Surgeon: Avtar Nam MD;  Location: [facility] OR    EXAM UNDER ANESTHESIA ANUS N/A 1/6/2023    Procedure: EXAM UNDER ANESTHESIA, ANUS, SETON EXCHANGE, partial fistulotomy;  Surgeon: Mary Dugan MD;  Location: [facility] OR    HYSTERECTOMY, VAGINAL  2006    with Dr. Licha Zhou - with BSO for fibroids     IR GALLBLADDER DRAIN PLACEMENT  4/22/2020    IR SIRT (SELECTIVE INTERNAL RADIO THERAPY)  2/21/2023    IR VISCERAL ANGIOGRAM  2/21/2023    IR VISCERAL EMBOLIZATION  2/27/2023    LAPAROSCOPIC ABLATION LIVER TUMOR N/A 8/29/2023    Procedure: Diagnostic Laparoscopy, Laparoscopic microwave ablation of liver tumor intraoperative  ultrasound of liver, lysis of adhesions 1 hour,;  Surgeon: Albino Saavedra MD;  Location: [facility] OR    OPEN REDUCTION INTERNAL FIXATION ANKLE Left at age 27    plates and screws removed at age 36    Pelviscopy with removal of bilateral hydrosalpinges.  04/15/2010    TRANSPLANT LIVER RECIPIENT  DONOR N/A 2024    Procedure: Transplant liver recipient  donor, with biliary stent placement;  Surgeon: Pravin Ball MD;  Location: [facility] OR    ZZC APPENDECTOMY  at age 14     Social History     Social History Narrative    calcium - drinks 5-6 large glasses skim milk/day    flex sig/colonoscopy -at age 49    sun precautions - discussed    mammogram - needs every 2 years in her 30's, then yearly from then on    Td booster -  and 2010    pneumovax -at age 59    DEXA -when perimenopausal    stool hemoccults - every year after age 39    ASA- start at age 39    mulvitamin - encouraged     Social History     Tobacco Use    Smoking status: Never     Passive exposure: Never    Smokeless tobacco: Never   Vaping Use    Vaping status: Never Used   Substance Use Topics    Alcohol use: Not Currently    Drug use: No     Immunization History   Administered Date(s) Administered    COVID-19 MONOVALENT 12+ (Pfizer) 2021, 2021, 2021    Influenza (IIV3) PF 2014    Influenza Vaccine 18-64 (Flublok) 10/03/2019, 09/15/2021, 2022    Influenza Vaccine >6 months,quad, PF 2016, 2020, 2024    Influenza Vaccine, 6+MO IM (QUADRIVALENT W/PRESERVATIVES) 2015, 10/05/2017    MMR 2007    Pneumococcal 20 valent Conjugate (Prevnar 20) 2022    Pneumococcal 23 valent 2020    TD,PF 7+ (Tenivac) 1999    TDAP Vaccine (Adacel) 2020    TDAP Vaccine (Boostrix) 2010    Zoster recombinant adjuvanted (SHINGRIX) 2020, 2020         Current Medications & Allergies:     Current Facility-Administered Medications   Medication Dose Route  Frequency Provider Last Rate Last Admin    aspirin (ASA) chewable tablet 81 mg  81 mg Oral or Feeding Tube Daily Ed Benítez MD   81 mg at 07/15/24 0746    cycloSPORINE modified (GENERIC EQUIVALENT) capsule 250 mg  250 mg Oral BID IS Tejal Gonzalez NP   250 mg at 07/15/24 0841    levETIRAcetam (KEPPRA) tablet 1,000 mg  1,000 mg Oral BID Joyce Finney MD   1,000 mg at 07/15/24 0748    magnesium oxide (MAG-OX) tablet 800 mg  800 mg Oral BID Ed Benítez MD   800 mg at 07/15/24 0747    mycophenolate (GENERIC EQUIVALENT) capsule 750 mg  750 mg Oral BID Ed Benítez MD   750 mg at 07/15/24 0747    pantoprazole (PROTONIX) EC tablet 40 mg  40 mg Oral Daily Ed Benítez MD   40 mg at 07/15/24 0747    predniSONE (DELTASONE) tablet 5 mg  5 mg Oral or Feeding Tube Daily Ed Benítez MD   5 mg at 07/15/24 0747    psyllium (METAMUCIL) wafer 2 Wafer  2 Wafer Oral Daily Ed Benítez MD   2 Wafer at 07/14/24 0816    sodium chloride (PF) 0.9% PF flush 3 mL  3 mL Intracatheter Q8H Samanta Butler MD   3 mL at 07/15/24 0845    ursodiol (ACTIGALL) capsule 300 mg  300 mg Oral or Feeding Tube TID Ed Benítez MD   300 mg at 07/15/24 0748    valGANciclovir (VALCYTE) tablet 450 mg  450 mg Oral Daily Ed Benítez MD   450 mg at 07/15/24 0748     Infusions/Drips:    Current Facility-Administered Medications   Medication Dose Route Frequency Provider Last Rate Last Admin     Allergies   Allergen Reactions    Blood Transfusion Related (Informational Only) Other (See Comments)     Patient has a history of a clinically significant antibody against RBC antigens.  Anti Jka identified at [facility] on 7/4/2024. A delay in compatible RBCs may occur.    Fish Oil      Redness and itching around eye area only - went away when fish oil capsules stopped     Metronidazole      pain/itching    Ppd [Tuberculin Purified Protein Derivative]     Sulfa Antibiotics      hives    Terbinafine And Related      Lamisil = mild urticarial  reaction          Physical Exam:   Ranges for vital signs over the past 24 hours: Temp:  [98  F (36.7  C)-98.6  F (37  C)] 98  F (36.7  C)  Pulse:  [86-97] 89  Resp:  [16-18] 18  BP: (136-145)/(82-89) 141/82  SpO2:  [95 %-98 %] 96 %  Vitals:    07/15/24 0448   Weight: 78.6 kg (173 lb 4.5 oz)     Intake/Output Summary (Last 24 hours) at 7/15/2024 1122  Last data filed at 7/15/2024 0824  Gross per 24 hour   Intake 237 ml   Output --   Net 237 ml     Physical Examination:  GENERAL:  well-developed, well-nourished, in bed in no acute distress.  HEAD:  Head is normocephalic, atraumatic. EEG electrodes attached  EYES:  Eyes have anicteric sclerae without conjunctival injection   ENT:  Oropharynx is moist without exudates or ulcers. Tongue is midline  NECK:  Supple. No cervical lymphadenopathy  LUNGS:  Clear to auscultation bilateral. On room air, no use of accessory muscles  CARDIOVASCULAR:  Regular rate and rhythm with no murmur  ABDOMEN:  Normal bowel sounds, soft, nontender.   SKIN:  No acute rashes. No CVC  NEUROLOGIC:  Grossly nonfocal. Active x4 extremities         Laboratory Data:     Inflammatory & Autoimmune Markers    Recent Labs   Lab Test 07/09/24  2202 07/03/24  2216 07/01/24  2146 06/22/24  1613 06/12/24  0954 02/21/24  1314 02/07/24  0925 09/19/23  0919 07/15/22  1356 09/15/21  1239 08/19/21  1431   SED 95*  --   --   --   --  48*  --  66*   < > 48* 27   CRP  --   --   --   --   --   --   --   --   --  5.0 4.4   CRPI 60.50* 144.00*   < >  --    < > 7.63*   < > 16.73*   < >  --   --    G6PD  --   --   --  11.0  --   --   --   --   --   --   --     < > = values in this interval not displayed.     Metabolic Studies       Recent Labs   Lab Test 07/15/24  0618 07/14/24  0620 07/13/24  0536 07/12/24  1840 07/10/24  0538 07/09/24  2221 07/09/24  2202 06/22/24  1617 06/22/24  1613   * 133*   < > 135   < >  --  135   < >  --    POTASSIUM 4.4 4.6   < > 4.1   < >  --  3.6   < >  --    CHLORIDE 101 102   < > 102    < >  --  99   < >  --    CO2 21* 22   < > 23   < >  --  24   < >  --    ANIONGAP 10 9   < > 10   < >  --  12   < >  --    BUN 12.9 10.1   < > 13.3   < >  --  12.7   < >  --    CR 0.59 0.56   < > 0.68   < >  --  0.60   < >  --    GFRESTIMATED >90 >90   < > >90   < >  --  >90   < >  --    * 114*   < > 134*   < >  --  141*   < >  --    A1C  --   --   --   --   --   --   --   --  5.1   ARIEL 8.6* 8.4*   < > 8.5*   < >  --  8.0*   < >  --    PHOS 3.6 3.7   < >  --    < >  --  2.6   < >  --    MAG 1.6* 1.8   < >  --    < >  --  1.4*   < >  --    LACT  --   --   --  1.3  --  1.5  --    < > 7.5*   PCAL  --   --   --   --   --   --  0.23   < >  --    CKT  --   --   --   --   --   --  49  --   --     < > = values in this interval not displayed.       Hepatic Studies    Recent Labs   Lab Test 07/15/24  0618 07/14/24  0620 07/13/24  0536 07/12/24  1840 07/10/24  0538 07/09/24  2221 07/09/24  2202 07/03/24  2216 07/03/24  0637 12/07/18  1204 11/02/17  1609   BILITOTAL 0.8 0.9   < > 0.7   < >  --  0.8   < > 1.9*   < > 1.9*   DBIL 0.34* 0.37*   < >  --    < >  --  0.33*   < > 0.91*   < > 1.0*   ALKPHOS 173* 173*   < > 149   < >  --  139   < > 127   < > 149   PROTTOTAL 7.2 6.9   < > 7.2   < >  --  7.1   < > 5.0*   < > 7.9   ALBUMIN 3.3* 3.1*   < > 3.4*   < >  --  3.4*   < > 2.8*   < > 3.2*   AST 19 23   < > 19   < >  --  24   < > 26   < > 73*   ALT 22 23   < > 21   < >  --  28   < > 56*   < > 48   LDH  --   --   --   --   --   --  272*  --  258*  --   --    GGT  --   --   --   --   --   --   --   --   --   --  311*   CHELSEY  --   --   --  20  --  16  --   --   --   --   --     < > = values in this interval not displayed.     Hematology Studies   Recent Labs   Lab Test 07/15/24  0618 07/14/24  0620 07/13/24  0536 07/12/24  1840 07/10/24  0656 07/09/24  2202 07/08/24  0915 07/06/24  0724 07/05/24  0608   WBC 7.8 7.6 8.3 9.0   < > 11.0   < > 13.5* 16.2*   ANEU  --   --   --   --   --   --   --  11.3* 12.6*   ANEUTAUTO  --   --    --  7.1  --  8.4*  --   --   --    ALYM  --   --   --   --   --   --   --  1.4 1.8   ALYMPAUTO  --   --   --  0.6*  --  0.9  --   --   --    JOSSY  --   --   --   --   --   --   --  0.1 0.6   AMONOAUTO  --   --   --  0.6  --  0.7  --   --   --    AEOS  --   --   --   --   --   --   --  0.7 0.8*   AEOSAUTO  --   --   --  0.6  --  0.8*  --   --   --    ABSBASO  --   --   --  0.0  --  0.1  --   --   --    HGB 7.9* 7.6* 7.4* 7.9*   < > 8.0*   < > 7.4* 7.5*   HCT 25.4* 23.7* 24.1* 24.9*   < > 24.4*   < > 22.3* 22.2*    403 373 405   < > 347   < > 338 330    < > = values in this interval not displayed.       Clotting Studies    Recent Labs   Lab Test 07/12/24  1840 07/09/24  2202 07/03/24  1343 06/26/24  0600   INR 1.08 1.02 1.10 1.23*   PTT  --  27 32  --      Urine Studies     Recent Labs   Lab Test 07/09/24  2252 07/04/24  0059 07/01/24  2236 06/30/24  1320 06/20/24  1944   URINEPH 8.0* 6.0 6.0 8.0* 7.0   NITRITE Negative Negative Negative Negative Negative   LEUKEST Small* Small* Trace* Small* Negative   WBCU 4 14* 11* 4 <1       Medication levels    Recent Labs   Lab Test 07/13/24  0536 07/03/24  0637 07/02/24  0635   VANCOMYCIN  --   --  18.8   TACROL 4.7*   < >  --     < > = values in this interval not displayed.       CSF testing     Recent Labs   Lab Test 07/10/24  1432   CWBC 0   CRBC 0   CGLU 68   CTP 48.8*       Microbiology:  Fungal testing  Recent Labs   Lab Test 07/03/24  1649   COFUNG <1:2       Last Culture results   Rapid Strep A Screen   Date Value Ref Range Status   09/26/2009   Final    NEGATIVE: No Group A streptococcal antigen detected by immunoassay, await   culture report.     Culture   Date Value Ref Range Status   07/10/2024 Isolated in broth only Micrococcus species (AA)  Final     Comment:     On day 3 of incubation   07/09/2024 No Growth  Final   07/09/2024 Positive on the 3rd day of incubation (A)  Preliminary   07/09/2024 Gram positive bacilli, resembling diphtheroids (AA)   Preliminary     Comment:     1 of 2 bottles   07/04/2024 <10,000 CFU/mL Urogenital sabrina  Final   07/03/2024 No Growth  Final   07/03/2024 No Growth  Final   07/03/2024 No Growth  Final   07/03/2024 No anaerobic organisms isolated  Final   07/03/2024 No growth after 11 days  Preliminary   07/01/2024 No Growth  Final   07/01/2024 No Growth  Final   07/01/2024 No Growth  Final   06/30/2024 No Growth  Final   06/30/2024 No Growth  Final   06/22/2024 No anaerobic organisms isolated  Final   06/22/2024 No growth after 23 days  Preliminary   06/22/2024 No Growth  Final   06/20/2024 <10,000 CFU/mL Mixture of Urogenital Sabrina  Final   06/20/2024   Final    No Vancomycin Resistant Enterococcus species isolated     GS Culture   Date Value Ref Range Status   06/22/2024 See corresponding culture for results  Final     Culture Micro   Date Value Ref Range Status   10/09/2019 No growth  Final   03/28/2019 (A)  Final    Moderate growth  Streptococcus agalactiae sero group B     03/28/2019 Light growth  Escherichia coli   (A)  Final   03/28/2019 Moderate growth  Streptococcus anginosus   (A)  Final   03/28/2019 Light growth  Strain 2  Escherichia coli   (A)  Final   03/28/2019 (A)  Final    Moderate growth  Bacteroides fragilis  Susceptibility testing not routinely done     03/28/2019 (A)  Final    Moderate growth  Mixed aerobic and anaerobic sabrina     03/28/2019 (A)  Final    Moderate growth  Streptococcus agalactiae sero group B  Susceptibility testing done on previous specimen     03/28/2019 Light growth  Citrobacter freundii complex   (A)  Final   03/28/2019 (A)  Final    Light growth  Escherichia coli  Susceptibility testing done on previous specimen     03/28/2019 Moderate growth  Enterococcus faecalis   (A)  Final   03/28/2019 (A)  Final    Light growth  Streptococcus anginosus  Susceptibility testing done on previous specimen     03/28/2019 Heavy growth  Mixed fecal sabrina    Final   03/28/2019   Final    Unable to determine  the presence of Actinomyces species due to an overgrowth of normal   sabrina.             Last checks of Clostridioides difficile testing  Recent Labs   Lab Test 06/30/24  2312   CDBPCT Negative       Syphilis Testing    Treponema Antibody Total   Date Value Ref Range Status   02/14/2023 Nonreactive Nonreactive Final       Quantiferon testing   Recent Labs   Lab Test 07/12/24  1840 07/09/24  2202 07/07/23  0912 02/14/23  0824 07/15/22  1356 06/10/22  1358   TBRES  --   --   --  Negative  --  Negative   LYMPH 7 8   < >  --    < >  --     < > = values in this interval not displayed.     Virology:  Coronavirus-19 testing    Recent Labs   Lab Test 07/10/24  0916 06/20/24  1825 02/25/21  1448   [id number] Negative Negative Test received-See reflex to IDDL test SARS CoV2 (COVID-19) Virus RT-PCR  NEGATIVE   [id number]  --   --  Nasopharyngeal   [id number]  --   --  Nasopharyngeal       Viral loads    Recent Labs   Lab Test 07/09/24  2329 06/30/24  1218   EBQI Not Detected  --    CMVQNT Not Detected Not Detected       CMV viral loads    Recent Labs   Lab Test 07/09/24  2329 06/30/24  1218   CMVQNT Not Detected Not Detected     Hepatitis B Testing     Recent Labs   Lab Test 06/20/24  1651 02/14/23  0824   AUSAB <3.50 1.41   HBCAB Nonreactive Nonreactive   HEPBANG Nonreactive Nonreactive        Hepatitis C Antibody   Date Value Ref Range Status   06/20/2024 Nonreactive Nonreactive Final     Comment:     A nonreactive screening test result does not exclude the possibility of exposure to or infection with HCV. Nonreactive screening test results in individuals with prior exposure to HCV may be due to antibody levels below the limit of detection of this assay or lack of reactivity to the HCV antigens used in this assay. Patients with recent HCV infections (<3 months from time of exposure) may have false-negative HCV antibody results due to the time needed for seroconversion (average of 8 to 9 weeks).   02/14/2023 Nonreactive  Nonreactive Final   05/20/2014 Negative NEG Final       CMV Antibody IgG   Date Value Ref Range Status   06/20/2024 No detectable antibody. No detectable antibody.  Final   02/14/2023 No detectable antibody. No detectable antibody.  Final     CMV Antibody IgM   Date Value Ref Range Status   06/20/2024 Negative Negative Final     EBV Capsid Antibody IgG   Date Value Ref Range Status   06/20/2024 Positive (A) No detectable antibody. Final     Comment:     Suggests recent or past exposure.   02/14/2023 Positive (A) No detectable antibody. Final     Comment:     Suggests recent or past exposure.     EBV Capsid Antibody IgM   Date Value Ref Range Status   06/20/2024 No detectable antibody. No detectable antibody. Final       Imaging:  No results found for this or any previous visit (from the past 48 hour(s)).     MRI Brain 7/10/24:  IMPRESSION:  1.  No recent infarct or intracranial mass.  2.  Trace subdural hemorrhage overlying the right occipital lobe without mass effect. There is mild pachymeningeal enhancement overlying the right convexity favored to reflect a reactive change.  3.  Nonspecific symmetric signal changes involving the corticospinal tracts which can be seen in the setting of chronic hepatic encephalopathy.

## 2024-07-15 NOTE — PROVIDER NOTIFICATION
"On-call paged with the following:    \"[id number] JOSE Matute - Pt admitted for seizure activity, prev had one-time dose IV ativan. Can we get this order back as PRN in case pt has increased seizure activity overnight? Thanks! - Susanna [phone]\"    1950: Received response from on-call MD. Will place orders for PRN IV ativan.    Susanna Alaniz RN    "

## 2024-07-15 NOTE — PLAN OF CARE
BP (!) 145/88 (BP Location: Right arm)   Pulse 87   Temp 98.6  F (37  C) (Oral)   Resp 16   LMP 05/31/2006   SpO2 97%     Shift: 4865-7359  Isolation Status: None  VS: VSS on RA, afebrile  Neuro: Aox4, able to make needs known. Seizure precautions maintained, no evidence of seizure activity this shift. Continuous EEG monitoring remains in place, attendant at bedside to record seizure activity.  Behaviors: Calm, cooperative, pleasant  BG: N/A  Labs/Imaging: Na 133, K 4.6, Ca 8.4, albumin 3.1, alk phos 173, Tbili 0.9, WBC 7.6, Hgb 7.6, hematocrit 23.7  Respiratory: WDL room air. Lung sounds diminished in lower lobes.  Cardiac: Hypertensive 140s/80s, intermittent tachycardia low 100s  Pain/Nausea: Denies  PRN: N/A  Diet: Regular diet, good appetite  LDA: Continuous EEG monitoring  Infusion(s): N/A  GI/: Voiding spontaneously without difficulty. BM x 2 today, soft.  Skin: Clamshell abdominal incision with steri strips, KRISTIAN, no drainage. Old JACEK site suture removed, site KRISTIAN, no drainage. Generalized dry/peeling skin, pt's home Vanicream at bedside, applied by pt's daughter.  Mobility: SBA w/gait belt, within arms reach at all times. Seizure precautions, side rails padded, attendant at bedside.  Events/Education: Family at bedside for much of afternoon/evening, supportive of pt/involved in care. Rest promoted, lighting decreased, cares clustered.  Plan: Continue with plan of care and notify team with any changes. Possible discharge tomorrow pending seizure activity.

## 2024-07-15 NOTE — TELEPHONE ENCOUNTER
Contacted inpatient coordinator team and left message to connect with patient.  Spoke with Albino to let him know.    Albino is wondering if he can have ativan in the home as needed for seizures. Paged to inpatient PA. Spoke with Abiola and she will touch base with neurology.       Albino is having some concerns and would like to speak with . Message to covering  to touch base with patient's .

## 2024-07-15 NOTE — PLAN OF CARE
/83 (BP Location: Right arm)   Pulse 90   Temp 98.2  F (36.8  C) (Oral)   Resp 16   LMP 05/31/2006   SpO2 96%     Shift: 1326-1033  Isolation Status: none  VS: stable on RA, afebrile  Neuro: Aox4  Behaviors: restful/ slept well  BG: N/a  Labs: am labs pending   Respiratory: WDl  Cardiac: on telemetry   Pain/Nausea: Denies   PRN: N/a  Diet: regular good appetite  IV Access: L PIV SL  Infusion(s): none  Lines/Drains: none  GI/: voiding, No BM over night   Skin: Clamshell abdominal incision with steri strips, KRISTIAN, no drainage. Old JACEK site suture removed, site KRISTIAN, no drainage. Generalized dry/peeling skin, pt's home Vanicream at bedside, applied by pt's daughter.   Mobility: SBA w/gait belt, within arms reach at all times. Seizure precautions, side rails padded, attendant at bedside.   Events/Education: n/a  Plan: cont w/POC

## 2024-07-15 NOTE — PROGRESS NOTES
EEG CLINICAL NEUROPHYSIOLOGY PRELIMINARY REPORT    Video EEG monitoring for approximately 6 AM today reviewed.    Findings are Within normal limits.  1) background activity was normal.  2) epileptiform discharges or electrographic seizures were not seen.  No seizures were marked by patient or detected with screening.  This is an improvement from yesterday's study during which multiple seizures were recorded.    Full report in chart.    Kulwant Phan MD  Contact information for physicians covering Epilepsy and EEG is available on [facility].  Click search, enter neurology adult/[facility] in group name box, click on neurology adult/[facility], then click Staff Epilepsy and EEG.

## 2024-07-15 NOTE — TELEPHONE ENCOUNTER
Patient Call: Voicemail   Date/Time: 7/15/2024 11/24/2024  Reason for call: Virginia will be discharging from hospital, and would like to get life spark and a few other things in place

## 2024-07-15 NOTE — TELEPHONE ENCOUNTER
Forms/Letter Request    Type of form/letter: Nursing Home/Assisted Living Orders  Order # [id number]                                                                           Do we have the form/letter: Yes: given to AVA wei    Who is the form from? Home care    Where did/will the form come from? form was faxed in    When is form/letter needed by: when available    How would you like the form/letter returned: Fax : [phone]

## 2024-07-15 NOTE — PROGRESS NOTES
[facility]  Neurology Consultation - Progress Note    Patient Name:  Abiola Matute  Date of Service:  July 15, 2024    Subjective:    NAEO. Patient sitting comfortably with partner present at bedside. Patient states that she didn't sleep well last night, but otherwise had a good night. Patient had questions regarding when she will be going home.     Objective:    Vitals: BP (!) 141/82 (BP Location: Right arm)   Pulse 89   Temp 98  F (36.7  C) (Oral)   Resp 18   Wt 78.6 kg (173 lb 4.5 oz)   LMP 05/31/2006   SpO2 96%   BMI 28.84 kg/m    General: laying in bed, not in acute distress  Head: normocephalic; EEG leads in place  Cardiac: no lower extremity edema  Neurologic:  Mental Status: Awake, alert, oriented to person, place, month, and situation. Follows one step commands on both sides of body. Naming and repetition intact. Spells SUPER accurately forwards and backwards.   Cranial Nerves: Visual fields full on confrontational testing, conjugate gaze with intact extraocular movements, upper and lower face symmetric at rest and with activation, tongue protrusion midline   Motor: No abnormal movements.  Moves all 4 limbs antigravity with 5/5 strength of elbow flexion/extension, hand , hip flexion, ankle dorsi/plantarflexion  Sensory: Intact to light touch x 4 limbs  Station/Gait: Not assessed today.    Pertinent Investigations:    I have personally reviewed most recent and pertinent labs, tests, and radiological images.     Assessment  [place] Matute is a 59 year old right-handed female with history of Crohn's disease on infliximab and liver cirrhosis c/b HCC s/p liver transplant 6/22/2024 (current immunosuppression: mycophenolate, prednisone, and tacrolimus) who initially came to neurology attention 7/10/24 after presenting with two seizures the evening of 7/9/24; semiology started with right arm automatism followed by right head turning and then generalized tonic-clonic  movements. Ms. Matute was started on Keppra 750 mg BID and exam was fully intact; MRI showed trace subdural hemorrhage overlying the right occipital lobe which was not redemonstrated on follow up head CT. Due to immunocompromised status and fever 1 week prior, an LP was performed which was negative for meningitis/encephalitis panel and HSV 1 and 2. WBC 0, protein 49, glucose 68.    Ms. Matute's neurologic exam was at baseline so she was discharged on 7/11/24 with plan for continuing Keppra 750 mg BID and follow up in general neurology clinic.    She returned to the ER on 7/12/24 after several episodes of loss of awareness, potentially seizure, with interval return to baseline. CT Head w/o contrast did not reveal new intracranial pathology. She was connected to continuous video EEG monitoring and had multiple clinical events (consisting of loss of awareness, staring but unresponsive) that had electrographic correlate.    She has had no further clinical or electrographic events since lorazepam 2 mg IV on evening of 7/13 as well as increase of Keppra to 1,000 mg BID. On exam this morning, she is at neurologic baseline.    Recommendations:   - Continue Keppra 1,000 mg BID  - Have patient follow up with epilepsy clinic on August 26  - At this time neurology will sign off    Thank you for involving Neurology in the care of Abiola Matute.  Please do not hesitate to reconsult with questions/concerns (consult pager [phone]).      Patient was seen and discussed with Dr. Alonso.    Ximena Duke MD  Internal Medicine, PGY-2

## 2024-07-16 ENCOUNTER — MEDICAL CORRESPONDENCE (OUTPATIENT)
Dept: HEALTH INFORMATION MANAGEMENT | Facility: CLINIC | Age: 59
End: 2024-07-16
Payer: MEDICARE

## 2024-07-16 VITALS
HEART RATE: 91 BPM | TEMPERATURE: 98.4 F | SYSTOLIC BLOOD PRESSURE: 116 MMHG | WEIGHT: 173.28 LBS | OXYGEN SATURATION: 97 % | RESPIRATION RATE: 16 BRPM | DIASTOLIC BLOOD PRESSURE: 76 MMHG | BODY MASS INDEX: 28.84 KG/M2

## 2024-07-16 LAB
ALBUMIN SERPL BCG-MCNC: 3.3 G/DL (ref 3.5–5.2)
ALBUMIN UR-MCNC: NEGATIVE MG/DL
ALP SERPL-CCNC: 171 U/L (ref 40–150)
ALT SERPL W P-5'-P-CCNC: 21 U/L (ref 0–50)
ANION GAP SERPL CALCULATED.3IONS-SCNC: 12 MMOL/L (ref 7–15)
APPEARANCE UR: CLEAR
AST SERPL W P-5'-P-CCNC: 19 U/L (ref 0–45)
BILIRUB DIRECT SERPL-MCNC: 0.33 MG/DL (ref 0–0.3)
BILIRUB SERPL-MCNC: 0.8 MG/DL
BILIRUB UR QL STRIP: NEGATIVE
BUN SERPL-MCNC: 15.2 MG/DL (ref 8–23)
CALCIUM SERPL-MCNC: 8.5 MG/DL (ref 8.8–10.2)
CHLORIDE SERPL-SCNC: 100 MMOL/L (ref 98–107)
COLOR UR AUTO: NORMAL
CREAT SERPL-MCNC: 0.68 MG/DL (ref 0.51–0.95)
EGFRCR SERPLBLD CKD-EPI 2021: >90 ML/MIN/1.73M2
ERYTHROCYTE [DISTWIDTH] IN BLOOD BY AUTOMATED COUNT: 18.5 % (ref 10–15)
GLUCOSE SERPL-MCNC: 107 MG/DL (ref 70–99)
GLUCOSE UR STRIP-MCNC: NEGATIVE MG/DL
HCO3 SERPL-SCNC: 22 MMOL/L (ref 22–29)
HCT VFR BLD AUTO: 25 % (ref 35–47)
HGB BLD-MCNC: 7.7 G/DL (ref 11.7–15.7)
HGB UR QL STRIP: NEGATIVE
KETONES UR STRIP-MCNC: NEGATIVE MG/DL
LEUKOCYTE ESTERASE UR QL STRIP: NEGATIVE
MAGNESIUM SERPL-MCNC: 1.7 MG/DL (ref 1.7–2.3)
MCH RBC QN AUTO: 31.7 PG (ref 26.5–33)
MCHC RBC AUTO-ENTMCNC: 30.8 G/DL (ref 31.5–36.5)
MCV RBC AUTO: 103 FL (ref 78–100)
NITRATE UR QL: NEGATIVE
PH UR STRIP: 7 [PH] (ref 5–7)
PHOSPHATE SERPL-MCNC: 3.8 MG/DL (ref 2.5–4.5)
PLATELET # BLD AUTO: 387 10E3/UL (ref 150–450)
POTASSIUM SERPL-SCNC: 4.4 MMOL/L (ref 3.4–5.3)
PROT SERPL-MCNC: 7.1 G/DL (ref 6.4–8.3)
RBC # BLD AUTO: 2.43 10E6/UL (ref 3.8–5.2)
RBC URINE: <1 /HPF
SODIUM SERPL-SCNC: 134 MMOL/L (ref 135–145)
SP GR UR STRIP: 1.01 (ref 1–1.03)
SQUAMOUS EPITHELIAL: <1 /HPF
UROBILINOGEN UR STRIP-MCNC: NORMAL MG/DL
WBC # BLD AUTO: 7.5 10E3/UL (ref 4–11)
WBC URINE: 1 /HPF

## 2024-07-16 PROCEDURE — 81001 URINALYSIS AUTO W/SCOPE: CPT

## 2024-07-16 PROCEDURE — 85027 COMPLETE CBC AUTOMATED: CPT

## 2024-07-16 PROCEDURE — 36415 COLL VENOUS BLD VENIPUNCTURE: CPT

## 2024-07-16 PROCEDURE — 99238 HOSP IP/OBS DSCHRG MGMT 30/<: CPT | Mod: 24 | Performed by: NURSE PRACTITIONER

## 2024-07-16 PROCEDURE — 80053 COMPREHEN METABOLIC PANEL: CPT

## 2024-07-16 PROCEDURE — 250N000013 HC RX MED GY IP 250 OP 250 PS 637

## 2024-07-16 PROCEDURE — 84100 ASSAY OF PHOSPHORUS: CPT

## 2024-07-16 PROCEDURE — 250N000012 HC RX MED GY IP 250 OP 636 PS 637: Performed by: NURSE PRACTITIONER

## 2024-07-16 PROCEDURE — 83735 ASSAY OF MAGNESIUM: CPT

## 2024-07-16 PROCEDURE — 250N000012 HC RX MED GY IP 250 OP 636 PS 637

## 2024-07-16 PROCEDURE — 82248 BILIRUBIN DIRECT: CPT

## 2024-07-16 RX ORDER — CYCLOSPORINE 100 MG/1
200 CAPSULE, LIQUID FILLED ORAL 2 TIMES DAILY
Qty: 120 CAPSULE | Refills: 11 | Status: ACTIVE | OUTPATIENT
Start: 2024-07-16 | End: 2024-07-17

## 2024-07-16 RX ORDER — CYCLOSPORINE 50 MG/1
50 CAPSULE, LIQUID FILLED ORAL 2 TIMES DAILY
Qty: 60 CAPSULE | Refills: 11 | Status: ACTIVE | OUTPATIENT
Start: 2024-07-16 | End: 2024-07-17

## 2024-07-16 RX ADMIN — MAGNESIUM OXIDE TAB 400 MG (241.3 MG ELEMENTAL MG) 800 MG: 400 (241.3 MG) TAB at 07:50

## 2024-07-16 RX ADMIN — VALGANCICLOVIR HYDROCHLORIDE 450 MG: 450 TABLET ORAL at 07:50

## 2024-07-16 RX ADMIN — LEVETIRACETAM 1000 MG: 500 TABLET, FILM COATED ORAL at 07:50

## 2024-07-16 RX ADMIN — MYCOPHENOLATE MOFETIL 750 MG: 250 CAPSULE ORAL at 07:50

## 2024-07-16 RX ADMIN — URSODIOL 300 MG: 300 CAPSULE ORAL at 07:51

## 2024-07-16 RX ADMIN — PREDNISONE 5 MG: 5 TABLET ORAL at 07:50

## 2024-07-16 RX ADMIN — ASPIRIN 81 MG CHEWABLE TABLET 81 MG: 81 TABLET CHEWABLE at 07:51

## 2024-07-16 RX ADMIN — CYCLOSPORINE 250 MG: 100 CAPSULE, LIQUID FILLED ORAL at 07:50

## 2024-07-16 RX ADMIN — PANTOPRAZOLE SODIUM 40 MG: 40 TABLET, DELAYED RELEASE ORAL at 07:50

## 2024-07-16 ASSESSMENT — ACTIVITIES OF DAILY LIVING (ADL)
ADLS_ACUITY_SCORE: 34
ADLS_ACUITY_SCORE: 35
ADLS_ACUITY_SCORE: 34
ADLS_ACUITY_SCORE: 33
ADLS_ACUITY_SCORE: 34
ADLS_ACUITY_SCORE: 33
ADLS_ACUITY_SCORE: 34
ADLS_ACUITY_SCORE: 34
ADLS_ACUITY_SCORE: 35
ADLS_ACUITY_SCORE: 34
ADLS_ACUITY_SCORE: 33

## 2024-07-16 NOTE — DISCHARGE SUMMARY
"[facility]    Discharge Summary  Transplant Surgery    Date of Admission:  7/12/2024  Date of Discharge:  7/16/2024  Discharging Provider: DARYN Johnson CNP / Nomi Bal MD     Discharge Diagnoses   Principal Problem:    Seizures (H)  Active Problems:    Hyponatremia    Immunosuppressed status (H24)    Liver replaced by transplant (H)     History of Present Illness   Abiola Matute is a 59 year old female with history of HCC in the setting of DUNN/AUD s/p DCD liver transplant w/ biliary stent 6/22/24, osteopenia, LE cellulitis, Crohn's disease on Infliximab, obesity. Postop course complicated by multiple readmissions. 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 Readmitted 7/9/24-7/11/24 after witnessed grand mal seizure at home. She was discharged on Keppra 750mg BID. Readmitted 7/12/24 with seizures.     Hospital Course   Graft function:  DCD liver transplant: AP mildly elevated, otherwise labs stable.  -Continue Ursodiol TID, ASA 81mg daily     Immunosuppression management:   -Mycophenolate 750 mg BID  -Tacrolimus stopped due to seizures.   -Cyclosporine started 7/13. Goal level ~200.  -Prednisone 5 mg daily      Neuro:   Seizures: Neurology consulted. EEG monitoring positive for seizure activity. Loaded with IV Keppra 1g + IV lorazepam 2mg. Keppra dose increased to 1000mg BID (Rx sent to pharmacy 7/12). 7/10 CNS studies were positive for micrococcus in broth only. Received vanco x1. ID consulted and felt micrococcus was a contaminant.   -Discharge on Keppra 1000mg BID  -Follow up in Epilepsy Clinic 8/26/24    Fluid/Electrolytes:  Hypomagnesemia: Continue MagOx supplement.  Hyponatremia: Na 134, unclear etiology. Monitor on outpatient labs.    Infectious disease:   CSF culture positive: Per ID \"The Micrococcus species isolation in broth alone from the 7/10/24 CSF culture " "is strongly suspected to be a contaminant.\"  Blood culture positive: Per ID \"The diphtheroids isolated from the 7/9/24 blood culture are also highly likely a (different) culture contaminant.\"    Pending Results   Unresulted Labs Ordered in the Past 30 Days of this Admission       Date and Time Order Name Status Description    7/9/2024 11:20 PM Blood Culture Peripheral Blood Preliminary     7/3/2024 12:36 PM Fungus Culture, non-blood Preliminary     6/23/2024  6:17 AM Prepare plasma (unit) Preliminary     6/22/2024 10:05 AM Prepare pheresed platelets (unit) Preliminary     6/22/2024  8:00 AM Fungal or Yeast Culture Routine Preliminary             Code Status   Full    Primary Care Physician   Linda Macdonald    Physical Exam   Temp: 98.4  F (36.9  C) Temp src: Oral BP: 116/76 Pulse: 91   Resp: 16 SpO2: 97 % O2 Device: None (Room air)    Vitals:    07/15/24 0448   Weight: 78.6 kg (173 lb 4.5 oz)     Vital Signs with Ranges  Temp:  [98.1  F (36.7  C)-98.6  F (37  C)] 98.4  F (36.9  C)  Pulse:  [86-95] 91  Resp:  [16-18] 16  BP: (116-151)/(76-87) 116/76  SpO2:  [97 %-98 %] 97 %  I/O last 3 completed shifts:  In: 487 [P.O.:487]  Out: -     General Appearance: in no apparent distress.  Skin: normal, dry  Heart: Well perfused  Lungs: Nonlabored resps on RA  Abdomen: Nontender, incision healing  : Voiding  Extremities: Cira independently, 5/5 strength.   Neuro: awake, alert, and oriented x3 Tremor absent.    Time Spent on this Encounter   I, DARYN Johnson CNP, personally saw the patient today and spent greater than 30 minutes discharging this patient.    Discharge Disposition   Discharged to home  Condition at discharge: Stable    Consultations This Hospital Stay   INFECTIOUS DISEASE TRANSPLANT SOT ADULT IP CONSULT  PHARMACY TO DOSE VANCO  NURSING TO CONSULT FOR VASCULAR ACCESS CARE IP CONSULT  NURSING TO CONSULT FOR VASCULAR ACCESS CARE IP CONSULT    Discharge Orders      Reason for your hospital stay    " Seizure     Activity    Your activity upon discharge: activity as tolerated     [facility] Follow-up and recommended labs and tests    1. Follow up in Epilepsy Clinic 8/26/24  2. CMP & CSA level every Monday & Thursday (in addition to usual transplant labs) until CSA level stable.  3. Continue weekly post-transplant appointments with Transplant Surgery     When to contact your care team    WHEN TO CONTACT YOUR  COORDINATOR:     Transplant Coordinator Zayra Paulino [phone]     Notify your coordinator if you have pain over your liver, increased redness or drainage from your incision, fever greater than 100F, or yellowing of skin or eyes.     Notify your coordinator immediately if you are ever unable to take your immunosuppressive medications for any reason.     If you have URGENT concerns after office hours, please call the hospital switchboard at [phone] and ask to have the organ transplant nurse on-call paged. If you have a life-threatening emergency, go to the nearest emergency room.     Diet    Follow this diet upon discharge: Regular     Discharge Medications   Current Discharge Medication List        START taking these medications    Details   cycloSPORINE modified (GENERIC EQUIVALENT) 50 MG capsule Take 5 capsules (250 mg) by mouth 2 times daily  Qty: 300 capsule, Refills: 11    Associated Diagnoses: History of liver transplant (H)           CONTINUE these medications which have NOT CHANGED    Details   acetaminophen (TYLENOL) 325 MG tablet Take 2 tablets (650 mg) by mouth every 6 hours as needed for mild pain  Qty: 60 tablet, Refills: 0    Associated Diagnoses: Acute post-operative pain      albuterol (PROVENTIL) (2.5 MG/3ML) 0.083% neb solution Take 1 vial (2.5 mg) by nebulization every 28 days  Qty: 90 mL, Refills: 5    Associated Diagnoses: S/P liver transplant (H)      aspirin (ASA) 81 MG chewable tablet 1 tablet (81 mg) by Oral or Feeding Tube route daily  Qty: 30 tablet, Refills: 5     Associated Diagnoses: S/P liver transplant (H)      emollient (VANICREAM) external cream Apply topically every 6 hours as needed for other (Dryness)  Qty: 453 g, Refills: 1    Associated Diagnoses: Xerosis cutis      levETIRAcetam (KEPPRA) 1000 MG tablet Take 1 tablet (1,000 mg) by mouth 2 times daily  Qty: 60 tablet, Refills: 1    Associated Diagnoses: Seizure (H)      magnesium oxide (MAG-OX) 400 MG tablet Take 2 tablets (800 mg) by mouth 2 times daily  Qty: 120 tablet, Refills: 3    Associated Diagnoses: Hypomagnesemia      mycophenolate (GENERIC EQUIVALENT) 250 MG capsule Take 3 capsules (750 mg) by mouth 2 times daily  Qty: 180 capsule, Refills: 11    Associated Diagnoses: S/P liver transplant (H)      pantoprazole (PROTONIX) 40 MG EC tablet Take 1 tablet (40 mg) by mouth daily  Qty: 30 tablet, Refills: 2    Associated Diagnoses: S/P liver transplant (H)      pentamidine (NEBUPENT) 300 MG neb solution Inhale 300 mg into the lungs every 28 days    Associated Diagnoses: S/P liver transplant (H)      polyethylene glycol (MIRALAX) 17 GM/Dose powder Take 17 g by mouth 2 times daily as needed for constipation  Qty: 510 g, Refills: 0    Associated Diagnoses: S/P liver transplant (H)      predniSONE (DELTASONE) 5 MG tablet 1 tablet (5 mg) by Oral or Feeding Tube route daily  Qty: 30 tablet, Refills: 2    Associated Diagnoses: S/P liver transplant (H)      psyllium (METAMUCIL) WAFR Take 2 Wafers by mouth daily  Qty: 60 Wafer, Refills: 2    Associated Diagnoses: S/P liver transplant (H)      traZODone (DESYREL) 50 MG tablet Take 0.5-1 tablets (25-50 mg) by mouth nightly as needed for sleep  Qty: 30 tablet, Refills: 0    Associated Diagnoses: Insomnia, unspecified type      ursodiol (ACTIGALL) 300 MG capsule 1 capsule (300 mg) by Oral or Feeding Tube route 3 times daily  Qty: 90 capsule, Refills: 11    Associated Diagnoses: S/P liver transplant (H)      valGANciclovir (VALCYTE) 450 MG tablet Take 1 tablet (450 mg) by  mouth daily  Qty: 180 tablet, Refills: 0    Associated Diagnoses: S/P liver transplant (H)           STOP taking these medications       furosemide (LASIX) 40 MG tablet Comments:   Reason for Stopping:         tacrolimus (GENERIC EQUIVALENT) 0.5 MG capsule Comments:   Reason for Stopping:         tacrolimus (GENERIC EQUIVALENT) 1 MG capsule Comments:   Reason for Stopping:             Allergies   Allergies   Allergen Reactions    Blood Transfusion Related (Informational Only) Other (See Comments)     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 A delay in compatible RBCs may occur.    Fish Oil      Redness and itching around eye area only - went away when fish oil capsules stopped     Metronidazole      pain/itching    Ppd [Tuberculin Purified Protein Derivative]     Sulfa Antibiotics      hives    Terbinafine And Related      Lamisil = mild urticarial reaction     Data   Most Recent 3 CBC's:  Recent Labs   Lab Test 07/16/24  0544 07/15/24  0618 07/14/24  0620   WBC 7.5 7.8 7.6   HGB 7.7* 7.9* 7.6*   * 102* 100    411 403     Most Recent 3 BMP's:  Recent Labs   Lab Test 07/16/24  0544 07/15/24  0618 07/14/24  0620   * 132* 133*   POTASSIUM 4.4 4.4 4.6   CHLORIDE 100 101 102   CO2 22 21* 22   BUN 15.2 12.9 10.1   CR 0.68 0.59 0.56   ANIONGAP 12 10 9   ARIEL 8.5* 8.6* 8.4*   * 126* 114*     Most Recent 2 LFT's:  Recent Labs   Lab Test 07/16/24  0544 07/15/24  0618   AST 19 19   ALT 21 22   ALKPHOS 171* 173*   BILITOTAL 0.8 0.8

## 2024-07-16 NOTE — PLAN OF CARE
Goal Outcome Evaluation  In rounding on Virginia and noted her to be sound asleep. Earlier Surgery cross cover did come and check her and she appeared to be falling off to sleep then. Will continue to monitor and report any further concerns. Also  is sleeping at bed side as well.

## 2024-07-16 NOTE — PLAN OF CARE
Goal Outcome Evaluation:  [id number] Nursing Shift Report:  Virginia stated this morning that she did not sleep at all. Seizure pads remains intact. No seizure activity noted. C/o painful IV site when flushing, so I discontinued it and she refused to have another one placed. She stated that she is leaving today.  had stayed the night and stated he would be back after his appointment to take her home. Did report Virginia is refusing to have another IV placed. Will continue to monitor and report any significant changes.

## 2024-07-16 NOTE — CONSULTS
Care Management Initial Consult    General Information  Assessment completed with: Patient, Spouse or significant other,    Type of CM/SW Visit: Initial Assessment    Primary Care Provider verified and updated as needed:     Readmission within the last 30 days: unable to assess   Return Category: Exacerbation of disease  Reason for Consult: discharge planning  Advance Care Planning: Advance Care Planning Reviewed: present on chart          Communication Assessment  Patient's communication style: spoken language (English or Bilingual)    Hearing Difficulty or Deaf: no   Wear Glasses or Blind: yes    Cognitive  Cognitive/Neuro/Behavioral: WDL  Level of Consciousness: alert  Arousal Level: opens eyes spontaneously  Orientation: oriented x 4  Mood/Behavior: calm, cooperative  Best Language: 0 - No aphasia  Speech: clear    Living Environment:   People in home: spouse     Current living Arrangements: house      Able to return to prior arrangements: yes       Family/Social Support:  Care provided by: self, spouse/significant other  Provides care for: no one  Marital Status:     Albino       Description of Support System: Supportive, Involved    Support Assessment: Adequate family and caregiver support    Current Resources:   Patient receiving home care services: Yes  Skilled Home Care Services: Skilled Nursing  Community Resources: Home Care  Equipment currently used at home: none  Supplies currently used at home:      Employment/Financial:  Employment Status: retired        Financial Concerns: none   Referral to Financial Worker: No       Does the patient's insurance plan have a 3 day qualifying hospital stay waiver?  No    Lifestyle & Psychosocial Needs:  Social Determinants of Health     Food Insecurity: Low Risk  (2/4/2024)    Food Insecurity     Within the past 12 months, did you worry that your food would run out before you got money to buy more?: No     Within the past 12 months, did the food you bought  just not last and you didn t have money to get more?: No   Depression: Not at risk (2/7/2024)    PHQ-2     PHQ-2 Score: 0   Housing Stability: Low Risk  (2/4/2024)    Housing Stability     Do you have housing? : Yes     Are you worried about losing your housing?: No   Tobacco Use: Low Risk  (7/9/2024)    Patient History     Smoking Tobacco Use: Never     Smokeless Tobacco Use: Never     Passive Exposure: Never   Financial Resource Strain: Low Risk  (2/4/2024)    Financial Resource Strain     Within the past 12 months, have you or your family members you live with been unable to get utilities (heat, electricity) when it was really needed?: No   Alcohol Use: Not on file   Transportation Needs: Low Risk  (2/4/2024)    Transportation Needs     Within the past 12 months, has lack of transportation kept you from medical appointments, getting your medicines, non-medical meetings or appointments, work, or from getting things that you need?: No   Physical Activity: Unknown (9/15/2020)    Exercise Vital Sign     Days of Exercise per Week: 0 days     Minutes of Exercise per Session: Not on file   Interpersonal Safety: Low Risk  (2/7/2024)    Interpersonal Safety     Do you feel physically and emotionally safe where you currently live?: Yes     Within the past 12 months, have you been hit, slapped, kicked or otherwise physically hurt by someone?: No     Within the past 12 months, have you been humiliated or emotionally abused in other ways by your partner or ex-partner?: No   Stress: Stress Concern Present (9/15/2020)    Citizen of the [place] of Occupational Health - Occupational Stress Questionnaire     Feeling of Stress : Rather much   Social Connections: Unknown (9/15/2020)    Social Connection and Isolation Panel [NHANES]     Frequency of Communication with Friends and Family: More than three times a week     Frequency of Social Gatherings with Friends and Family: More than three times a week     Attends Yarsani Services: Not  on file     Active Member of Clubs or Organizations: Not on file     Attends Club or Organization Meetings: Not on file     Marital Status: Not on file   Health Literacy: Not on file     Functional Status:  Prior to admission patient needed assistance:      Mental Health Status:  Mental Health Status: No Current Concerns       Chemical Dependency Status:  Chemical Dependency Status: No Current Concerns           Values/Beliefs:  Spiritual, Cultural Beliefs, Temple Practices, Values that affect care: yes  Description of Beliefs that Will Affect Care: Jagjit          Additional Information:       Met with patient and  Albino at bedside for initial consult due to high risk score. Introduced self and the role of the care team. Patient confirmed that nothing has changed from last admission to this admission besides her medications. Patient confirmed living at he house address on file with . She is independent with her care but  is available to help and support. Patient ambulates without assistive device and still drive but  will be providing ride at discharge. Patient confirmed her current PCP and insurances on file.  Patient was already opened to [facility]. Resumption of care order placed.   Called [facility] and notified liaison Vincent  of patient discharging today and will need lab draw on Thursday.     Care Management Discharge Note    Discharge Date: 07/16/2024       Discharge Disposition: Home Care, Home    Discharge Services: Home Care    Discharge DME: None    Discharge Transportation: family or friend will provide ( will provide ride)    Private pay costs discussed: Not applicable    Does the patient's insurance plan have a 3 day qualifying hospital stay waiver?  No    PAS Confirmation Code:  NA  Patient/family educated on Medicare website which has current facility and service quality ratings:      Education Provided on the Discharge Plan: Yes  Persons Notified of Discharge  Plans: patient,  and care team  Patient/Family in Agreement with the Plan: yes    Handoff Referral Completed: Yes    Additional Information:    Internal hand off placed. Patient and  are in agreement with discharge plan. They verbalized being very happy about the care patient received during this admission and denied any other need.      Discharge resources:   [facility]  Phone: [phone]    Alia Smith RN, PHN, BSN   Float Nurse Care Coordinator  Covering for [address]  Phone [phone]

## 2024-07-16 NOTE — PLAN OF CARE
Goal Outcome Evaluation:    Vitally stable on room air. Denies pain or nausea. Up and walking in halls with standby assist. Good appetite on regular diet. Voiding, not saving. No BM this shift. On telemetry, NSR. Possible discharge tomorrow.  Albino at bedside supportive of patient.

## 2024-07-16 NOTE — PROGRESS NOTES
DISCHARGE:  Patient with orders to discharge to Home    Discharge instructions, medications & follow ups reviewed with Patient & Spouse. Copy of discharge summary given to Patient.     Patient in stable condition. AVSS. Patient * Spouse had no further questions regarding discharge instructions and medications. Patient transferred out by wheelchair accompanied by staff & spouse.

## 2024-07-16 NOTE — PLAN OF CARE
Goal Outcome Evaluation:      Plan of Care Reviewed With: patient, spouse    Overall Patient Progress: improvingOverall Patient Progress: improving    Outcome Evaluation: Plan: discharge home and resuming home care services with [facility].    Alia Smith RN, PHN, BSN   Float Nurse Care Coordinator  Covering for [address]  Phone [phone]

## 2024-07-16 NOTE — PROVIDER NOTIFICATION
7A [place] Matute, liver serv. here with seizures. c/o not being able to sleep. Did have melatonin and trazadone around 10p. Do you think she could have atarax for sleep?  Darshana GOODRICH [id number]

## 2024-07-17 ENCOUNTER — TELEPHONE (OUTPATIENT)
Dept: TRANSPLANT | Facility: CLINIC | Age: 59
End: 2024-07-17
Payer: MEDICARE

## 2024-07-17 ENCOUNTER — PATIENT OUTREACH (OUTPATIENT)
Dept: CARE COORDINATION | Facility: CLINIC | Age: 59
End: 2024-07-17
Payer: MEDICARE

## 2024-07-17 DIAGNOSIS — Z94.4 S/P LIVER TRANSPLANT (H): ICD-10-CM

## 2024-07-17 DIAGNOSIS — E83.42 HYPOMAGNESEMIA: ICD-10-CM

## 2024-07-17 DIAGNOSIS — R56.9 SEIZURE (H): ICD-10-CM

## 2024-07-17 DIAGNOSIS — Z94.4 HISTORY OF LIVER TRANSPLANT (H): ICD-10-CM

## 2024-07-17 LAB
BACTERIA BLD CULT: ABNORMAL
BACTERIA BLD CULT: ABNORMAL

## 2024-07-17 RX ORDER — CYCLOSPORINE 100 MG/1
200 CAPSULE, LIQUID FILLED ORAL 2 TIMES DAILY
Qty: 120 CAPSULE | Refills: 11 | Status: SHIPPED | OUTPATIENT
Start: 2024-07-17 | End: 2024-07-23

## 2024-07-17 RX ORDER — URSODIOL 300 MG/1
300 CAPSULE ORAL 3 TIMES DAILY
Qty: 90 CAPSULE | Refills: 11 | Status: SHIPPED | OUTPATIENT
Start: 2024-07-17

## 2024-07-17 RX ORDER — LEVETIRACETAM 1000 MG/1
1000 TABLET ORAL 2 TIMES DAILY
Qty: 60 TABLET | Refills: 1 | Status: SHIPPED | OUTPATIENT
Start: 2024-07-17 | End: 2024-07-19

## 2024-07-17 RX ORDER — PANTOPRAZOLE SODIUM 40 MG/1
40 TABLET, DELAYED RELEASE ORAL DAILY
Qty: 30 TABLET | Refills: 11 | Status: SHIPPED | OUTPATIENT
Start: 2024-07-17 | End: 2024-10-03

## 2024-07-17 RX ORDER — MAGNESIUM OXIDE 400 MG/1
800 TABLET ORAL 2 TIMES DAILY
Qty: 120 TABLET | Refills: 11 | Status: SHIPPED | OUTPATIENT
Start: 2024-07-17 | End: 2024-09-25

## 2024-07-17 RX ORDER — MYCOPHENOLATE MOFETIL 250 MG/1
750 CAPSULE ORAL 2 TIMES DAILY
Qty: 180 CAPSULE | Refills: 1 | Status: SHIPPED | OUTPATIENT
Start: 2024-07-17 | End: 2024-07-30

## 2024-07-17 RX ORDER — ASPIRIN 81 MG/1
81 TABLET, CHEWABLE ORAL DAILY
Qty: 30 TABLET | Refills: 11 | Status: SHIPPED | OUTPATIENT
Start: 2024-07-17

## 2024-07-17 RX ORDER — PREDNISONE 5 MG/1
5 TABLET ORAL DAILY
Qty: 30 TABLET | Refills: 2 | Status: SHIPPED | OUTPATIENT
Start: 2024-07-17 | End: 2024-09-24

## 2024-07-17 RX ORDER — CYCLOSPORINE 50 MG/1
50 CAPSULE, LIQUID FILLED ORAL 2 TIMES DAILY
Qty: 60 CAPSULE | Refills: 11 | Status: SHIPPED | OUTPATIENT
Start: 2024-07-17 | End: 2024-07-23 | Stop reason: DRUGHIGH

## 2024-07-17 NOTE — TELEPHONE ENCOUNTER
Form was completed by AVA Macdonald  Faxed to [phone]  Sent to \[facility]\""  And filed in [facility].

## 2024-07-17 NOTE — TELEPHONE ENCOUNTER
Please call KP Fr[facility] wanted to discuss what labs that need to be drawn tomorrow 07/18/2024.    [facility] # [phone]

## 2024-07-17 NOTE — LETTER
OUTPATIENT LABORATORY TEST ORDER   Home Care   Fax#[phone]    Patient Name: Abiola Matute     YOB: 1965       [facility] MR# [if applicable]: [id number]   Date & Time: June 24, 2024  9:26 AM  Expiration Date: 1 year after date issued         Diagnosis:      Liver Transplant (ICD-10 Z94.4)   Aftercare following organ transplant (ICD-10 Z48.288)   Long term use of medications (ICD-10 Z79.899)   Contact with and (suspected) exposure to other viral communicable                       diseases (Z20.828)      We ask your assistance in obtaining the following laboratory tests, which are part of our routine surveillance program for Solid Organ Transplant patients.      Please fax each result to [phone], same day as resulted/available    Critical lab results page [phone]      Monthly next due 7/22/2024  Phoshatdylethanol (PeTH)     First 8 weeks post-transplant   Labs 2x/week (Monday/Thursday)  CBC with Platelets   Basic Metabolic Panel   Phosphorous  Magnesium  Hepatic panel        Cyclosporine drug level - 12-hour trough, please document time of last dose         At 1-month post-transplant (7/22/2024 or after)  HBV, HCV, HIV by RUTH testing  If unable to conduct RUTH testing, please substitute the following:   Hepatitis B DNA Quantitative, Real-Time PCR   Hepatitis C RNA, Quantitation   HIV 1 RNA, Quantitation   HIV 2 RNA, Quantitation              If you have any questions, please call [facility]- [phone] or [phone], Fax- [phone].      Pravin Ball MD, DEBRA  Professor of Surgery  [employer]  Surgical Director of Liver Transplant Program  Executive Medical Director of Pediatric Transplantation

## 2024-07-17 NOTE — TELEPHONE ENCOUNTER
Form was completed by AVA Macdonald   Faxed to [employer]  [phone]  Sent to [facility]  And filed in [facility].

## 2024-07-17 NOTE — PROGRESS NOTES
Clinic Care Coordination Contact  Transitions of Care Outreach  Chief Complaint   Patient presents with    Clinic Care Coordination - Initial    Clinic Care Coordination - Post Hospital     Most Recent Admission Date: 7/12/2024   Most Recent Admission Diagnosis: Seizures (H) - R56.9     Most Recent Discharge Date: 7/16/2024   Most Recent Discharge Diagnosis: Seizures (H) - R56.9  History of liver transplant (H) - Z94.4  S/P liver transplant (H) - Z94.4  Intestinal infection due to Clostridium difficile - A04.72     Transitions of Care Assessment    Discharge Assessment  How are you doing now that you are home?: Doing ok, but has been in out of the hospital 4 times since transplant.  How are your symptoms? (Red Flag symptoms escalate to triage hotline per guidelines): Improved  Do you know how to contact your clinic care team if you have future questions or changes to your health status? : Yes  Does the patient have their discharge instructions? : Yes  Does the patient have questions regarding their discharge instructions? : No  Were you started on any new medications or were there changes to any of your previous medications? : Yes  Does the patient have all of their medications?: Yes  Do you have questions regarding any of your medications? : No  Do you have all of your needed medical supplies or equipment (DME)?  (i.e. oxygen tank, CPAP, cane, etc.): Yes    Post-op (CHW CTA Only)  If the patient had a surgery or procedure, do they have any questions for a nurse?: No    Post-op (Clinicians Only)  Did the patient have surgery or a procedure: No  Fever: No  Chills: No  Eating & Drinking: eating and drinking without complaints/concerns  PO Intake: regular diet  Bowel Function: normal  Date of last BM: 07/18/24  Urinary Status: voiding without complaint/concerns    Follow up Plan     Discharge Follow-Up  Discharge follow up appointment scheduled in alignment with recommended follow up timeframe or Transitions of Risk  Category? (Low = within 30 days; Moderate= within 14 days; High= within 7 days): Yes  Discharge Follow Up Appointment Date: 08/09/24  Discharge Follow Up Appointment Scheduled with?: Specialty Care Provider (Pulmonology)  Patient's follow up appointment not scheduled: Patient declined scheduling support. Education on the importance of transitions of care follow up. Provided scheduling phone number.    RN CC contacted patient for post-hospital follow up and to introduce Care Coordination. Spoke with patient's , Albino. Full assessment not indicated as patient's  declined to have Care Coordination support at this time. RN CC sent introduction to patient after last admission (7/2/24).     Future Appointments   Date Time Provider Department Center   7/29/2024  8:30 AM  LAB [facility]   7/29/2024  9:30 AM Pravin Ball MD [facility]   7/29/2024  2:30 PM Sanaz Ortega RD [facility]   8/5/2024  8:45 AM  LAB [facility]   8/5/2024 11:00 AM UC PFL PENT UCPFT [facility]   8/26/2024 10:00 AM Soo Lugo MD [facility]   9/27/2024  9:30 AM [id number] [facility]   9/27/2024 10:00 AM  LAB [facility]   9/30/2024 11:30 AM Jeannette Cervantes MD [facility]   9/30/2024 12:30 PM Demetri Mariee, [facility]     Outpatient Plan as outlined on AVS reviewed with patient.    For any urgent concerns, please contact our 24 hour nurse triage line: [phone] ([phone])       Tracey Boothe RN, BSN, CPHN, [facility] Ambulatory Care Management  [facility]  Phone: [phone]  Email: [email or web address]                  Clinic Care Coordination Contact  [facility]/Voicemail    Clinical Data: Care Coordinator Outreach    Outreach Documentation Number of Outreach Attempt   7/17/2024   9:56 AM 1     Left message on patient's voicemail with call back information and requested return call.    Plan: Care Coordinator  will try to reach patient again in 1-2 business days.    Tracey Boothe RN, BSN, CPHN, CCM  [facility] Ambulatory Care Management  [facility]  Phone: [phone]  Email: [email or web address]

## 2024-07-17 NOTE — Clinical Note
Jose Guadalupe afternoon, Linda. I am the RN Care Coordinator for the [facility]. I did another Transitional Care Management outreach to [place] today. Her , Albino, requested that I send you a message updating you on [place]'s situation. She was admitted to [facility]  on 6/20 and had a liver transplant on 6/22/24. She has been readmitted (6/30, 7/9 and 7/12). She was started on Keppra but is still experiencing breakthrough seizures. They are adjusting her Keppra dosing. She has follow ups scheduled with transplant surgeon,Pulmonology and Neurology. Her transplant surgeon wanted to make sure you were aware of her situation and ensure you are able to continue to follow patient for any usual complaints. The transplant team will continue to follow her for any issues with her transplant.  Tracey Boothe RN, BSN, CPHN, CCM [facility] Ambulatory Care Management [facility] Phone: [phone] Email: [email or web address]

## 2024-07-17 NOTE — TELEPHONE ENCOUNTER
Post Discharge Liver Transplant Phone Call (within 1-3 business days post discharge)      Reviewed with the patient the Transplant Center contact information:  Best phone contact is [phone] Monday-Friday from 8am-5pm.   *You may have to leave a message and get a call back.    Good alternative communication method is via [employer] message.    Coordinators are available 8am-5pm M-F, otherwise there will be an on-call RN available 24/7  *If calling after hours, please call the same number, [phone] and ask the  to page the on-call Transplant Coordinator    When to contact the Transplant Center:  Fever > 100.5F  Dizziness, lightheadedness  Severe pain  If you are unable to take your immunosuppression medications.  Unable to obtain refill on immunosuppressive medications    Medications:  Reviewed medications with patient.   - confirmed pt has supply of meds  - MAR is up to date   - Verify preferred pharmacy in [place]  - Reminded patient to always contact the pharmacy first for refills    Confirmed the patient has an up to date medication card at home and is knowledgeable in updating their med card (or has someone to assist in this).   - Reinforced patient always bring med card to appointment      Labs:  Labs are expected to be drawn on Monday and Thursday until you are told differently by your transplant team.   - confirmed patient's preferred lab and updated EPIC   - Take a copy of your lab letter with to each lab draw   - Lab order sent directly to patient via [employer] or mail.    - Confirmed patient has a copy of lab letter  - Review how to review lab results on [employer] or obtaining and recording lab values in handbook    Vitals:  Encouraged the patient to record the following:  BP - daily unless light headed  Temperature - daily  Weight - daily    Follow Up:  Role of the Transplant Coordinator vs LPN was reviewed. Contact information provided for both.   Reviewed current scheduled follow up appointments  with surgeon.    Reminded the patient to follow up with PCP within 1 -2 weeks for general follow-up and management of diabetes, pain, and blood pressure  STENT REMOVAL - reminded patient that if stent was placed at time of transplant (this is indicated on the check list) this will need to come out 2-3 mos post transplant.  Asked patient to notify transplant coordinator if sees stent in stool.  Has the patient been contacted with initial visit from HOME CARE? Yes  If not, Transplant Coordinator to be notified to follow up with Home Care  Pt will mostly use the  specialty pharmacy  Valcyte will go to [facility]  Pt will have an appt when things get settled    Michelle Starks LPN

## 2024-07-17 NOTE — TELEPHONE ENCOUNTER
Form was reviewed by AVA Macdonald   Sent back to [phone]  Sent to [facility]  And filed in [facility].

## 2024-07-18 ENCOUNTER — TELEPHONE (OUTPATIENT)
Dept: TRANSPLANT | Facility: CLINIC | Age: 59
End: 2024-07-18

## 2024-07-18 ENCOUNTER — TELEPHONE (OUTPATIENT)
Dept: GASTROENTEROLOGY | Facility: CLINIC | Age: 59
End: 2024-07-18

## 2024-07-18 ENCOUNTER — TELEPHONE (OUTPATIENT)
Dept: FAMILY MEDICINE | Facility: CLINIC | Age: 59
End: 2024-07-18

## 2024-07-18 ENCOUNTER — MEDICAL CORRESPONDENCE (OUTPATIENT)
Dept: HEALTH INFORMATION MANAGEMENT | Facility: CLINIC | Age: 59
End: 2024-07-18

## 2024-07-18 ENCOUNTER — TRANSFERRED RECORDS (OUTPATIENT)
Dept: HEALTH INFORMATION MANAGEMENT | Facility: CLINIC | Age: 59
End: 2024-07-18

## 2024-07-18 ENCOUNTER — LAB REQUISITION (OUTPATIENT)
Dept: LAB | Facility: CLINIC | Age: 59
End: 2024-07-18
Payer: MEDICARE

## 2024-07-18 DIAGNOSIS — Z48.288 ENCOUNTER FOR AFTERCARE FOLLOWING MULTIPLE ORGAN TRANSPLANT: ICD-10-CM

## 2024-07-18 DIAGNOSIS — Z94.4 LIVER TRANSPLANT STATUS (H): ICD-10-CM

## 2024-07-18 DIAGNOSIS — Z79.899 OTHER LONG TERM (CURRENT) DRUG THERAPY: ICD-10-CM

## 2024-07-18 LAB
ALBUMIN SERPL BCG-MCNC: 3.7 G/DL (ref 3.5–5.2)
ALP SERPL-CCNC: 189 U/L (ref 40–150)
ALT SERPL W P-5'-P-CCNC: 21 U/L (ref 0–50)
ANION GAP SERPL CALCULATED.3IONS-SCNC: 13 MMOL/L (ref 7–15)
AST SERPL W P-5'-P-CCNC: 23 U/L (ref 0–45)
BILIRUB DIRECT SERPL-MCNC: 0.3 MG/DL (ref 0–0.3)
BILIRUB SERPL-MCNC: 0.8 MG/DL
BUN SERPL-MCNC: 17.8 MG/DL (ref 8–23)
CALCIUM SERPL-MCNC: 9 MG/DL (ref 8.8–10.4)
CHLORIDE SERPL-SCNC: 100 MMOL/L (ref 98–107)
CREAT SERPL-MCNC: 0.6 MG/DL (ref 0.51–0.95)
CYCLOSPORINE BLD LC/MS/MS-MCNC: 234 UG/L (ref 50–400)
EGFRCR SERPLBLD CKD-EPI 2021: >90 ML/MIN/1.73M2
ERYTHROCYTE [DISTWIDTH] IN BLOOD BY AUTOMATED COUNT: 18.1 % (ref 10–15)
GLUCOSE SERPL-MCNC: 108 MG/DL (ref 70–99)
HCO3 SERPL-SCNC: 22 MMOL/L (ref 22–29)
HCT VFR BLD AUTO: 26.6 % (ref 35–47)
HGB BLD-MCNC: 8.2 G/DL (ref 11.7–15.7)
MAGNESIUM SERPL-MCNC: 1.7 MG/DL (ref 1.7–2.3)
MCH RBC QN AUTO: 30.6 PG (ref 26.5–33)
MCHC RBC AUTO-ENTMCNC: 30.8 G/DL (ref 31.5–36.5)
MCV RBC AUTO: 99 FL (ref 78–100)
PHOSPHATE SERPL-MCNC: 4 MG/DL (ref 2.5–4.5)
PLATELET # BLD AUTO: 415 10E3/UL (ref 150–450)
POTASSIUM SERPL-SCNC: 4.2 MMOL/L (ref 3.4–5.3)
PROT SERPL-MCNC: 8.3 G/DL (ref 6.4–8.3)
RBC # BLD AUTO: 2.68 10E6/UL (ref 3.8–5.2)
SODIUM SERPL-SCNC: 135 MMOL/L (ref 135–145)
TME LAST DOSE: NORMAL H
TME LAST DOSE: NORMAL H
WBC # BLD AUTO: 8.2 10E3/UL (ref 4–11)

## 2024-07-18 PROCEDURE — 80053 COMPREHEN METABOLIC PANEL: CPT | Mod: ORL | Performed by: TRANSPLANT SURGERY

## 2024-07-18 PROCEDURE — 80158 DRUG ASSAY CYCLOSPORINE: CPT | Mod: ORL | Performed by: TRANSPLANT SURGERY

## 2024-07-18 PROCEDURE — 82248 BILIRUBIN DIRECT: CPT | Mod: ORL | Performed by: TRANSPLANT SURGERY

## 2024-07-18 PROCEDURE — 84100 ASSAY OF PHOSPHORUS: CPT | Mod: ORL | Performed by: TRANSPLANT SURGERY

## 2024-07-18 PROCEDURE — 83735 ASSAY OF MAGNESIUM: CPT | Mod: ORL | Performed by: TRANSPLANT SURGERY

## 2024-07-18 PROCEDURE — 85027 COMPLETE CBC AUTOMATED: CPT | Mod: ORL | Performed by: TRANSPLANT SURGERY

## 2024-07-18 NOTE — TELEPHONE ENCOUNTER
Patient Call: General  Route to LPN    Reason for call: Call from Home care nurse re: medications. Pt is having itchiness and difficulty sleeping and would like to try benadryl. Please call back ASAP    Call back needed? Yes    Return Call Needed  Same as documented in contacts section  When to return call?: Same day: Route High Priority

## 2024-07-18 NOTE — TELEPHONE ENCOUNTER
Forms/Letter Request    Type of form/letter: Nursing Home/Assisted Living Orders order number [id number]      Do we have the form/letter: Yes: given to AVA Macdonald    Who is the form from? Home care    Where did/will the form come from? form was faxed in    When is form/letter needed by: when available    How would you like the form/letter returned: Fax : [phone]

## 2024-07-18 NOTE — TELEPHONE ENCOUNTER
[facility] Call Center    Phone Message    May a detailed message be left on voicemail: yes     Reason for Call: Other: Patient received vmail message to have CT done; however, she just had one done on 6/30/24.  Also, she had a liver transplant on 6/22/24.  Any questions, please follow up with patient.  All records in [facility].     Action Taken: Message routed to:  [facility] ([facility]): [facility] Hepatology [facility]    Travel Screening: Not Applicable

## 2024-07-19 ENCOUNTER — TELEPHONE (OUTPATIENT)
Dept: TRANSPLANT | Facility: CLINIC | Age: 59
End: 2024-07-19
Payer: MEDICARE

## 2024-07-19 DIAGNOSIS — R56.9 SEIZURE (H): ICD-10-CM

## 2024-07-19 DIAGNOSIS — Z94.4 S/P LIVER TRANSPLANT (H): ICD-10-CM

## 2024-07-19 RX ORDER — VALGANCICLOVIR 450 MG/1
450 TABLET, FILM COATED ORAL DAILY
Qty: 90 TABLET | Refills: 1 | Status: SHIPPED | OUTPATIENT
Start: 2024-07-19

## 2024-07-19 RX ORDER — LEVETIRACETAM 1000 MG/1
1000 TABLET ORAL 3 TIMES DAILY
Qty: 90 TABLET | Refills: 1 | Status: SHIPPED | OUTPATIENT
Start: 2024-07-19 | End: 2024-08-29

## 2024-07-19 NOTE — TELEPHONE ENCOUNTER
"Spoke with Albino.  [place] is still having seizure activity 5-6 episodes in total yesterday.  She loses control in hands and legs 1min-2 min.  She did have one that lasted 7.5 mins. She gets slight confusion.    They were told to let neurology know if lasted longer than 7 mins.     Albino reports that her body \"burns\" meds up faster.     The only thing that is different than the hospital is they are  magnesium and cellcept.     Spoke Ralf Phan, neurology patient to increase Keppra to 1000 mg TID.  It patient continues to have seizures patient needs to go to the ER.     Updated Albino.with plan      "

## 2024-07-19 NOTE — TELEPHONE ENCOUNTER
Provider Call: General  Route to LPN    Reason for call: HHN has questions re Kepra and level      Call back needed? Yes    Return Call Needed  Same as documented in contacts section  When to return call?: Greater than one day: Route standard priority

## 2024-07-19 NOTE — TELEPHONE ENCOUNTER
Pt is requesting new rx for    Valacyclovir hcl 1gm tabs    Did not see on active med list please verify and send new rx. Thank you!    [facility]  [phone]

## 2024-07-19 NOTE — TELEPHONE ENCOUNTER
Form signed by AVA Macdonald   And faxed back to [phone]  Sent to [facility]  And filed in [facility].

## 2024-07-19 NOTE — TELEPHONE ENCOUNTER
Seizure like episodes x3-4 today.   BPs 130s/80s    Each episode lasting between 30s-2 min.   Pt is now alert and oriented x4 and feels back to normal.     Loose parameters were given to pt re: returning to ED. If episode lasts 8-9 min, or if pt loses control of bowel/bladder during episode.     RNCC advised pt to follow these parameters if they feel comfortable. Pt would like to speak with primary RNCC in the am and would like Keppra level drawn.

## 2024-07-20 LAB — BACTERIA FLD CULT: NO GROWTH

## 2024-07-22 ENCOUNTER — TELEPHONE (OUTPATIENT)
Dept: PHARMACY | Facility: CLINIC | Age: 59
End: 2024-07-22

## 2024-07-22 ENCOUNTER — LAB REQUISITION (OUTPATIENT)
Dept: LAB | Facility: CLINIC | Age: 59
End: 2024-07-22
Payer: MEDICARE

## 2024-07-22 DIAGNOSIS — Z79.899 OTHER LONG TERM (CURRENT) DRUG THERAPY: ICD-10-CM

## 2024-07-22 DIAGNOSIS — Z48.288 ENCOUNTER FOR AFTERCARE FOLLOWING MULTIPLE ORGAN TRANSPLANT: ICD-10-CM

## 2024-07-22 DIAGNOSIS — Z94.4 LIVER TRANSPLANT STATUS (H): ICD-10-CM

## 2024-07-22 LAB
ALBUMIN SERPL BCG-MCNC: 3.5 G/DL (ref 3.5–5.2)
ALP SERPL-CCNC: 179 U/L (ref 40–150)
ALT SERPL W P-5'-P-CCNC: 20 U/L (ref 0–50)
ANION GAP SERPL CALCULATED.3IONS-SCNC: 13 MMOL/L (ref 7–15)
AST SERPL W P-5'-P-CCNC: 19 U/L (ref 0–45)
BILIRUB DIRECT SERPL-MCNC: 0.28 MG/DL (ref 0–0.3)
BILIRUB SERPL-MCNC: 0.6 MG/DL
BUN SERPL-MCNC: 18.6 MG/DL (ref 8–23)
CALCIUM SERPL-MCNC: 8.8 MG/DL (ref 8.8–10.4)
CHLORIDE SERPL-SCNC: 100 MMOL/L (ref 98–107)
CREAT SERPL-MCNC: 0.69 MG/DL (ref 0.51–0.95)
CYCLOSPORINE BLD LC/MS/MS-MCNC: 298 UG/L (ref 50–400)
EGFRCR SERPLBLD CKD-EPI 2021: >90 ML/MIN/1.73M2
ERYTHROCYTE [DISTWIDTH] IN BLOOD BY AUTOMATED COUNT: 17.6 % (ref 10–15)
GLUCOSE SERPL-MCNC: 117 MG/DL (ref 70–99)
HCO3 SERPL-SCNC: 22 MMOL/L (ref 22–29)
HCT VFR BLD AUTO: 28.2 % (ref 35–47)
HGB BLD-MCNC: 8.6 G/DL (ref 11.7–15.7)
MAGNESIUM SERPL-MCNC: 1.6 MG/DL (ref 1.7–2.3)
MCH RBC QN AUTO: 30.6 PG (ref 26.5–33)
MCHC RBC AUTO-ENTMCNC: 30.5 G/DL (ref 31.5–36.5)
MCV RBC AUTO: 100 FL (ref 78–100)
PHOSPHATE SERPL-MCNC: 3.9 MG/DL (ref 2.5–4.5)
PLATELET # BLD AUTO: 353 10E3/UL (ref 150–450)
POTASSIUM SERPL-SCNC: 4 MMOL/L (ref 3.4–5.3)
PROT SERPL-MCNC: 7.7 G/DL (ref 6.4–8.3)
RBC # BLD AUTO: 2.81 10E6/UL (ref 3.8–5.2)
SODIUM SERPL-SCNC: 135 MMOL/L (ref 135–145)
TME LAST DOSE: NORMAL H
TME LAST DOSE: NORMAL H
WBC # BLD AUTO: 7 10E3/UL (ref 4–11)

## 2024-07-22 PROCEDURE — 80321 ALCOHOLS BIOMARKERS 1OR 2: CPT | Mod: ORL | Performed by: TRANSPLANT SURGERY

## 2024-07-22 PROCEDURE — 80053 COMPREHEN METABOLIC PANEL: CPT | Mod: ORL | Performed by: TRANSPLANT SURGERY

## 2024-07-22 PROCEDURE — 85027 COMPLETE CBC AUTOMATED: CPT | Mod: ORL | Performed by: TRANSPLANT SURGERY

## 2024-07-22 PROCEDURE — 80158 DRUG ASSAY CYCLOSPORINE: CPT | Mod: ORL | Performed by: TRANSPLANT SURGERY

## 2024-07-22 PROCEDURE — 87516 HEPATITIS B DNA AMP PROBE: CPT | Mod: ORL | Performed by: TRANSPLANT SURGERY

## 2024-07-22 PROCEDURE — 83735 ASSAY OF MAGNESIUM: CPT | Mod: ORL | Performed by: TRANSPLANT SURGERY

## 2024-07-22 PROCEDURE — 84100 ASSAY OF PHOSPHORUS: CPT | Mod: ORL | Performed by: TRANSPLANT SURGERY

## 2024-07-22 NOTE — TELEPHONE ENCOUNTER
Refaxed Order #'s [id number], [id number], [id number], [id number], [id number], [id number] all to [facility] #[phone]    Closed encounter,    Noemi PAN

## 2024-07-22 NOTE — TELEPHONE ENCOUNTER
Clinical Pharmacy Consult:                                                      Transplant Specific: 1 month post transplant pharmacy review.   Date of Transplant: 06/22/2024  Type of Transplant: liver  First Transplant: yes  History of rejection: no    Immunosuppression Regimen   Prednisone 5 mg qAM,  mg qAM & 750 mg qPM, and  mg qAM & 250 mg qPM  Patient specific goal: ~200  Most recent level: 234, date 07/18/2024  Immunosuppressant Levels:  Therapeutic  Pt adherent to lab draws: yes  Scr:   Lab Results   Component Value Date    CR 0.69 07/22/2024    CR 0.71 02/23/2021     Side effects:     Prophylactic Medications  PJP Prophylactic: Inhaled Pentamidine 300mg q 4 weeks  Scheduled Discontinue Date: 6 months Anticipated date 12/22/2024    Antifungal: Not needed thus far  Scheduled Discontinue Date: N/A Anticipated date      CMV Prophylactic: CrCl 40 to 59 mL/minute: Valcyte 450 mg once daily   Scheduled Discontinue Date: 6 months Anticipated date 12/22/2024    Acid Reducer: Protonix (pantoprazole)  Scheduled Reviewed Date:  N/a    Vascular Prophylactic: Aspirin 81 mg PO daily  Scheduled Discontinue Date:  MD to evaluate    Blood Pressure Management:   Patient blood pressure goal: <140/90 mmHg  Frequency of home blood pressure checks: .   Most recent home BP:   Patient blood pressure at goal:    Hospitalizations/ER visits since last assessment: 0.     Med rec/DUR performed: yes.   Med rec discrepancies: no.    Spoke with Albino briefly, [place] is doing okay at home. Declined full pharmacy review since [facility] provider 7/9. He is keeping her medication card up to date and helping with pill organizer.     Advised on refill process for [facility], enrolled [place] into program. Albino aware to call when they have 10 days or left of anti-rejection meds.     Time Spent: 5 minutes    Fer He, PharmD, BCACP  [facility]  [address] [place] [id number]  Specialty -  [phone]  [facility] - [phone]  [facility] - [phone]

## 2024-07-22 NOTE — TELEPHONE ENCOUNTER
[facility] calls to state they received these forms but they forms are supposed to go to [facility].  Put message out on Teams to the PL TC's to see if they can retrieve forms and fax to [facility]

## 2024-07-23 ENCOUNTER — TELEPHONE (OUTPATIENT)
Dept: FAMILY MEDICINE | Facility: CLINIC | Age: 59
End: 2024-07-23
Payer: MEDICARE

## 2024-07-23 ENCOUNTER — TELEPHONE (OUTPATIENT)
Dept: TRANSPLANT | Facility: CLINIC | Age: 59
End: 2024-07-23
Payer: MEDICARE

## 2024-07-23 ENCOUNTER — MEDICAL CORRESPONDENCE (OUTPATIENT)
Dept: HEALTH INFORMATION MANAGEMENT | Facility: CLINIC | Age: 59
End: 2024-07-23
Payer: MEDICARE

## 2024-07-23 DIAGNOSIS — Z94.4 S/P LIVER TRANSPLANT (H): ICD-10-CM

## 2024-07-23 DIAGNOSIS — Z94.4 HISTORY OF LIVER TRANSPLANT (H): ICD-10-CM

## 2024-07-23 RX ORDER — CYCLOSPORINE 25 MG/1
25 CAPSULE, LIQUID FILLED ORAL EVERY 12 HOURS
Qty: 180 CAPSULE | Refills: 3 | Status: SHIPPED | OUTPATIENT
Start: 2024-07-23 | End: 2024-07-25

## 2024-07-23 RX ORDER — VALGANCICLOVIR 450 MG/1
450 TABLET, FILM COATED ORAL DAILY
Qty: 90 TABLET | Refills: 1 | Status: CANCELLED | OUTPATIENT
Start: 2024-07-23

## 2024-07-23 RX ORDER — CYCLOSPORINE 100 MG/1
200 CAPSULE, LIQUID FILLED ORAL 2 TIMES DAILY
Qty: 120 CAPSULE | Refills: 11 | Status: SHIPPED | OUTPATIENT
Start: 2024-07-23 | End: 2024-07-25

## 2024-07-23 NOTE — TELEPHONE ENCOUNTER
Home Health Care      Reason for call:  [facility] - Order #[id number] - Cert Period: 06/29/24 - 08/27/24  PT to assess and evaluate and develop PT plan    Orders are needed for this patient.  above    Pt Provider: Renae ESCALANTE    Phone Number Homecare Nurse can be reached at: Fax       Could we send this information to you in [employer] or would you prefer to receive a phone call?:   na    Put in providers in box    Noemi K

## 2024-07-23 NOTE — TELEPHONE ENCOUNTER
ISSUE:   Cyclosporine level 298 on 7/22, goal 200, dose 250 mg BID.    PLAN:   Call Patient and confirm this was an accurate 12-hour trough.   Verify Cyclosporine dose 250 mg BID.   Confirm no new medications or or missed doses.   Confirm no new illness / infection / diarrhea.   If accurate trough and accurate dose, decrease Cyclosporine dose to 225 mg BID     Is this more than a 50% increase or decrease in current IS dose: No  If YES, justification:     Repeat labs in thursday .  *If > 50% change in immunosuppression dose, repeat labs in 1 week.   Zayra Paulino, KP    OUTCOME:   Spoke with Albino, they confirm accurate trough level and current dose 250 mg BID.   Albino confirmed dose change to 225 mg BID.  Albino agreed to repeat labs in 3 days.   Orders sent to preferred pharmacy for dose change and lab for repeat labs.   Albino voiced understanding of plan.     [facility] is doing it's job with pt per Albino.     Michelle Starks LPN

## 2024-07-23 NOTE — TELEPHONE ENCOUNTER
Spoke to Albino who states that he spoke to [facility] [place] and thy will try to get the new CSA script from the [facility]. Writer was under the impression that the specialty pharmacy was going to be the pharmacy of choice. Informed Albino to have pt remain on CSA until the new dose is available. [facility] hopes to get the 25's filled today by 5.

## 2024-07-23 NOTE — TELEPHONE ENCOUNTER
Nomi from [employer] ([phone]) calls for verbal orders for PT 1xwk for 6 wks. Ok to leave a detailed message.

## 2024-07-24 LAB
HBV DNA SERPL QL NAA+PROBE: NORMAL
HCV RNA SERPL QL NAA+PROBE: NORMAL
HIV1+2 RNA SERPL QL NAA+PROBE: NORMAL
LABORATORY REPORT: NORMAL
PETH INTERPRETATION: NORMAL
PLPETH BLD-MCNC: <10 NG/ML
POPETH BLD-MCNC: <10 NG/ML

## 2024-07-24 NOTE — TELEPHONE ENCOUNTER
Form signed by AVA Macdonald  Faxed to [phone]  Sent to [facility]  And filed in [facility].

## 2024-07-24 NOTE — TELEPHONE ENCOUNTER
Form had not been received at time of encounter.  Form signed and returned to TC folder.  - Mariela, CNP

## 2024-07-25 ENCOUNTER — TELEPHONE (OUTPATIENT)
Dept: TRANSPLANT | Facility: CLINIC | Age: 59
End: 2024-07-25

## 2024-07-25 ENCOUNTER — LAB REQUISITION (OUTPATIENT)
Dept: LAB | Facility: CLINIC | Age: 59
End: 2024-07-25
Payer: MEDICARE

## 2024-07-25 DIAGNOSIS — Z48.288 ENCOUNTER FOR AFTERCARE FOLLOWING MULTIPLE ORGAN TRANSPLANT: ICD-10-CM

## 2024-07-25 DIAGNOSIS — Z79.899 OTHER LONG TERM (CURRENT) DRUG THERAPY: ICD-10-CM

## 2024-07-25 DIAGNOSIS — Z94.4 S/P LIVER TRANSPLANT (H): ICD-10-CM

## 2024-07-25 DIAGNOSIS — Z94.4 HISTORY OF LIVER TRANSPLANT (H): ICD-10-CM

## 2024-07-25 DIAGNOSIS — Z94.4 LIVER TRANSPLANT STATUS (H): ICD-10-CM

## 2024-07-25 LAB
ALBUMIN SERPL BCG-MCNC: 3.6 G/DL (ref 3.5–5.2)
ALP SERPL-CCNC: 170 U/L (ref 40–150)
ALT SERPL W P-5'-P-CCNC: 20 U/L (ref 0–50)
ANION GAP SERPL CALCULATED.3IONS-SCNC: 14 MMOL/L (ref 7–15)
AST SERPL W P-5'-P-CCNC: 19 U/L (ref 0–45)
BILIRUB DIRECT SERPL-MCNC: 0.23 MG/DL (ref 0–0.3)
BILIRUB SERPL-MCNC: 0.7 MG/DL
BUN SERPL-MCNC: 19.5 MG/DL (ref 8–23)
CALCIUM SERPL-MCNC: 8.8 MG/DL (ref 8.8–10.4)
CHLORIDE SERPL-SCNC: 97 MMOL/L (ref 98–107)
CREAT SERPL-MCNC: 0.7 MG/DL (ref 0.51–0.95)
CYCLOSPORINE BLD LC/MS/MS-MCNC: 226 UG/L (ref 50–400)
EGFRCR SERPLBLD CKD-EPI 2021: >90 ML/MIN/1.73M2
ERYTHROCYTE [DISTWIDTH] IN BLOOD BY AUTOMATED COUNT: 17.2 % (ref 10–15)
FASTING STATUS PATIENT QL REPORTED: ABNORMAL
GLUCOSE SERPL-MCNC: 102 MG/DL (ref 70–99)
HCO3 SERPL-SCNC: 23 MMOL/L (ref 22–29)
HCT VFR BLD AUTO: 28.4 % (ref 35–47)
HGB BLD-MCNC: 8.9 G/DL (ref 11.7–15.7)
MAGNESIUM SERPL-MCNC: 1.7 MG/DL (ref 1.7–2.3)
MCH RBC QN AUTO: 30.5 PG (ref 26.5–33)
MCHC RBC AUTO-ENTMCNC: 31.3 G/DL (ref 31.5–36.5)
MCV RBC AUTO: 97 FL (ref 78–100)
PHOSPHATE SERPL-MCNC: 3.9 MG/DL (ref 2.5–4.5)
PLATELET # BLD AUTO: 344 10E3/UL (ref 150–450)
POTASSIUM SERPL-SCNC: 4.5 MMOL/L (ref 3.4–5.3)
PROT SERPL-MCNC: 7.9 G/DL (ref 6.4–8.3)
RBC # BLD AUTO: 2.92 10E6/UL (ref 3.8–5.2)
SODIUM SERPL-SCNC: 134 MMOL/L (ref 135–145)
TME LAST DOSE: NORMAL H
TME LAST DOSE: NORMAL H
WBC # BLD AUTO: 7.8 10E3/UL (ref 4–11)

## 2024-07-25 PROCEDURE — 83735 ASSAY OF MAGNESIUM: CPT | Mod: ORL | Performed by: NURSE PRACTITIONER

## 2024-07-25 PROCEDURE — 80053 COMPREHEN METABOLIC PANEL: CPT | Mod: ORL | Performed by: NURSE PRACTITIONER

## 2024-07-25 PROCEDURE — 82248 BILIRUBIN DIRECT: CPT | Mod: ORL | Performed by: NURSE PRACTITIONER

## 2024-07-25 PROCEDURE — 84100 ASSAY OF PHOSPHORUS: CPT | Mod: ORL | Performed by: NURSE PRACTITIONER

## 2024-07-25 PROCEDURE — 80158 DRUG ASSAY CYCLOSPORINE: CPT | Mod: ORL | Performed by: NURSE PRACTITIONER

## 2024-07-25 PROCEDURE — 85049 AUTOMATED PLATELET COUNT: CPT | Mod: ORL | Performed by: NURSE PRACTITIONER

## 2024-07-25 RX ORDER — CYCLOSPORINE 25 MG/1
25 CAPSULE, LIQUID FILLED ORAL PRN
Qty: 180 CAPSULE | Refills: 0 | Status: SHIPPED | OUTPATIENT
Start: 2024-07-25 | End: 2024-08-15

## 2024-07-25 RX ORDER — CYCLOSPORINE 100 MG/1
200 CAPSULE, LIQUID FILLED ORAL 2 TIMES DAILY
Qty: 120 CAPSULE | Refills: 11 | Status: SHIPPED | OUTPATIENT
Start: 2024-07-25 | End: 2024-08-15

## 2024-07-25 NOTE — TELEPHONE ENCOUNTER
ISSUE:   Cyclosporine level 226 on 7/25, goal 200, dose 225 mg BID.    PLAN:   Call Patient and confirm this was an accurate 12-hour trough.   Verify Cyclosporine dose 225 mg BID.   Confirm no new medications or or missed doses.   Confirm no new illness / infection / diarrhea.   If accurate trough and accurate dose, decrease Cyclosporine dose to 200 mg BID     Is this more than a 50% increase or decrease in current IS dose: No  If YES, justification:     Repeat labs on Monday. .  *If > 50% change in immunosuppression dose, repeat labs in 1 week.     OUTCOME:   Spoke with Other [facility],  , they confirm accurate trough level and current dose 225 mg BID.   Other [facility]  confirmed dose change to 200 mg BID.  Other [facility]  agreed to repeat labs on Monday. .   Orders sent to preferred pharmacy for dose change and lab for repeat labs.   Other rafi  voiced understanding of plan.

## 2024-07-26 ENCOUNTER — TELEPHONE (OUTPATIENT)
Dept: FAMILY MEDICINE | Facility: CLINIC | Age: 59
End: 2024-07-26
Payer: MEDICARE

## 2024-07-26 ENCOUNTER — MEDICAL CORRESPONDENCE (OUTPATIENT)
Dept: HEALTH INFORMATION MANAGEMENT | Facility: CLINIC | Age: 59
End: 2024-07-26
Payer: MEDICARE

## 2024-07-26 NOTE — TELEPHONE ENCOUNTER
Home Health Care      Reason for call:  [facility] - Order #[id number] Cert Period 06/29/24 - 08/27/24 Medication Corrections    Orders are needed for this patient.  above    Pt Provider: AVA Macdonald    Phone Number Homecare Nurse can be reached at: [phone]      Could we send this information to you in [employer] or would you prefer to receive a phone call?:   na    Put in providers in box    Noemi K

## 2024-07-29 ENCOUNTER — TELEPHONE (OUTPATIENT)
Dept: FAMILY MEDICINE | Facility: CLINIC | Age: 59
End: 2024-07-29

## 2024-07-29 ENCOUNTER — VIRTUAL VISIT (OUTPATIENT)
Dept: TRANSPLANT | Facility: CLINIC | Age: 59
End: 2024-07-29
Attending: TRANSPLANT SURGERY
Payer: MEDICARE

## 2024-07-29 ENCOUNTER — LAB (OUTPATIENT)
Dept: LAB | Facility: CLINIC | Age: 59
End: 2024-07-29
Attending: TRANSPLANT SURGERY
Payer: MEDICARE

## 2024-07-29 VITALS
DIASTOLIC BLOOD PRESSURE: 90 MMHG | OXYGEN SATURATION: 98 % | TEMPERATURE: 98.1 F | WEIGHT: 171.5 LBS | RESPIRATION RATE: 16 BRPM | SYSTOLIC BLOOD PRESSURE: 145 MMHG | BODY MASS INDEX: 28.54 KG/M2 | HEART RATE: 87 BPM

## 2024-07-29 DIAGNOSIS — Z94.4 S/P LIVER TRANSPLANT (H): ICD-10-CM

## 2024-07-29 DIAGNOSIS — Z94.4 S/P LIVER TRANSPLANT (H): Primary | ICD-10-CM

## 2024-07-29 DIAGNOSIS — Z94.4 LIVER REPLACED BY TRANSPLANT (H): ICD-10-CM

## 2024-07-29 LAB
ALBUMIN SERPL BCG-MCNC: 3.9 G/DL (ref 3.5–5.2)
ALP SERPL-CCNC: 171 U/L (ref 40–150)
ALT SERPL W P-5'-P-CCNC: 18 U/L (ref 0–50)
ANION GAP SERPL CALCULATED.3IONS-SCNC: 11 MMOL/L (ref 7–15)
AST SERPL W P-5'-P-CCNC: 20 U/L (ref 0–45)
BILIRUB DIRECT SERPL-MCNC: 0.26 MG/DL (ref 0–0.3)
BILIRUB SERPL-MCNC: 0.7 MG/DL
BUN SERPL-MCNC: 21.1 MG/DL (ref 8–23)
CALCIUM SERPL-MCNC: 9.4 MG/DL (ref 8.8–10.4)
CHLORIDE SERPL-SCNC: 101 MMOL/L (ref 98–107)
CREAT SERPL-MCNC: 0.77 MG/DL (ref 0.51–0.95)
CYCLOSPORINE BLD LC/MS/MS-MCNC: 203 UG/L (ref 50–400)
EGFRCR SERPLBLD CKD-EPI 2021: 88 ML/MIN/1.73M2
ERYTHROCYTE [DISTWIDTH] IN BLOOD BY AUTOMATED COUNT: 16.6 % (ref 10–15)
GLUCOSE SERPL-MCNC: 106 MG/DL (ref 70–99)
HCO3 SERPL-SCNC: 24 MMOL/L (ref 22–29)
HCT VFR BLD AUTO: 32.1 % (ref 35–47)
HGB BLD-MCNC: 10.2 G/DL (ref 11.7–15.7)
MAGNESIUM SERPL-MCNC: 1.7 MG/DL (ref 1.7–2.3)
MCH RBC QN AUTO: 30.1 PG (ref 26.5–33)
MCHC RBC AUTO-ENTMCNC: 31.8 G/DL (ref 31.5–36.5)
MCV RBC AUTO: 95 FL (ref 78–100)
PHOSPHATE SERPL-MCNC: 4 MG/DL (ref 2.5–4.5)
PLATELET # BLD AUTO: 333 10E3/UL (ref 150–450)
POTASSIUM SERPL-SCNC: 4.4 MMOL/L (ref 3.4–5.3)
PROT SERPL-MCNC: 8.4 G/DL (ref 6.4–8.3)
RBC # BLD AUTO: 3.39 10E6/UL (ref 3.8–5.2)
SODIUM SERPL-SCNC: 136 MMOL/L (ref 135–145)
TACROLIMUS BLD-MCNC: <1 UG/L (ref 5–15)
TME LAST DOSE: ABNORMAL H
TME LAST DOSE: ABNORMAL H
TME LAST DOSE: NORMAL H
TME LAST DOSE: NORMAL H
WBC # BLD AUTO: 7.7 10E3/UL (ref 4–11)

## 2024-07-29 PROCEDURE — G0463 HOSPITAL OUTPT CLINIC VISIT: HCPCS | Performed by: TRANSPLANT SURGERY

## 2024-07-29 PROCEDURE — 80158 DRUG ASSAY CYCLOSPORINE: CPT

## 2024-07-29 PROCEDURE — 83735 ASSAY OF MAGNESIUM: CPT | Performed by: PATHOLOGY

## 2024-07-29 PROCEDURE — 80197 ASSAY OF TACROLIMUS: CPT | Performed by: TRANSPLANT SURGERY

## 2024-07-29 PROCEDURE — 85027 COMPLETE CBC AUTOMATED: CPT | Performed by: PATHOLOGY

## 2024-07-29 PROCEDURE — 36415 COLL VENOUS BLD VENIPUNCTURE: CPT | Performed by: PATHOLOGY

## 2024-07-29 PROCEDURE — 84100 ASSAY OF PHOSPHORUS: CPT | Performed by: PATHOLOGY

## 2024-07-29 PROCEDURE — 80053 COMPREHEN METABOLIC PANEL: CPT | Performed by: PATHOLOGY

## 2024-07-29 PROCEDURE — 99213 OFFICE O/P EST LOW 20 MIN: CPT | Mod: 24 | Performed by: TRANSPLANT SURGERY

## 2024-07-29 PROCEDURE — 82248 BILIRUBIN DIRECT: CPT | Performed by: PATHOLOGY

## 2024-07-29 PROCEDURE — 99000 SPECIMEN HANDLING OFFICE-LAB: CPT | Performed by: PATHOLOGY

## 2024-07-29 RX ORDER — DIPHENHYDRAMINE HCL 50 MG
50 CAPSULE ORAL
COMMUNITY
End: 2024-09-25

## 2024-07-29 NOTE — TELEPHONE ENCOUNTER
Forms/Letter Request    Type of form/letter: Nursing Home/Assisted Living Orders  [facility]  order #[id number]  Do we have the form/letter: Yes: given to AVA Macdonald    Who is the form from? Home care    Where did/will the form come from? form was faxed in    When is form/letter needed by: when available    How would you like the form/letter returned: Fax : [phone]

## 2024-07-29 NOTE — TELEPHONE ENCOUNTER
Forms/Letter Request    Type of form/letter: Nursing Home/Assisted Living Orders  [facility]  order [id number]  Do we have the form/letter: Yes: given to AVA Macdonald    Who is the form from? Home care    Where did/will the form come from? form was faxed in    When is form/letter needed by: when available    How would you like the form/letter returned: [phone]

## 2024-07-29 NOTE — TELEPHONE ENCOUNTER
Form was signed by AVA Macdonald  [facility]  # [phone]  Sent to [facility]  And filed in the north drawer.

## 2024-07-29 NOTE — NURSING NOTE
Chief Complaint   Patient presents with    Follow Up     Liver transplant 6/22/2024       BP (!) 160/95 (BP Location: Right arm, Patient Position: Sitting, Cuff Size: Adult Regular)   Pulse 87   Temp 98.1  F (36.7  C) (Oral)   Resp 16   Wt 77.8 kg (171 lb 8 oz)   LMP 05/31/2006   SpO2 98%   BMI 28.54 kg/m      Right arm 145/90 large cuff, sitting    LORNA HARRIS RN on 7/29/2024 at 9:05 AM                    F (37.2 °C) (Oral)   Resp 20   Ht 5' 3\" (1.6 m)   Wt 89.2 kg (196 lb 10.4 oz)   SpO2 94%   BMI 34.84 kg/m²     General: This is a 89 year old female in no acute distress, not in distress secondary to pain.   Psych: She is awake, alert and oriented to time, place and person. Mood and affect are appropriate.  Head: Appears to be atraumatic and normocephalic.   Eyes: Reveal no icterus, no scleral injection, no conjunctival pallor.  Extraocular movements appear to be intact.  Throat: Oropharyngeal exam reveals moist oral mucosa. There is no erythema, discharge or thrush. Dentition is good.  Neck: Supple and symmetric. No carotid bruit. There is no JVD or thyromegaly.     Lungs: Air entry good in all 4 quadrants. Reveals good effort. Mild scattered wheezes with minimal bibasilar crackles noted. No accessory muscle use  Heart: Reveals presence of first and second heart sounds. Regular rate and rhythm. No murmurs, rubs or gallops.   Abdomen: Normoactive bowel sounds. Soft and nontender. There is no rebound tenderness.  There is no hepatosplenomegaly.   Extremities: No clubbing, cyanosis or edema.  Neurologic: Cranial nerves II through XII appear grossly intact. Sensation is intact.  Skin: Appears unremarkable and no rashes are present.    Labs:  Lab Results   Component Value Date    SODIUM 138 11/06/2022    POTASSIUM 4.5 11/06/2022    CHLORIDE 103 11/06/2022    CO2 30 11/06/2022    GLUCOSE 125 (H) 11/06/2022    BUN 19 11/06/2022    CREATININE 1.12 (H) 11/06/2022    ALBUMIN 3.0 (L) 11/06/2022    BILIRUBIN 0.2 11/06/2022    LACTA 1.0 11/06/2022    AST 16 11/06/2022     Lab Results   Component Value Date    WBC 6.3 11/06/2022    HGB 11.7 (L) 11/06/2022    HCT 37.3 11/06/2022     11/06/2022    CRP 1.1 (H) 06/26/2019       Cardiac Investigations:   Results for orders placed or performed during the hospital encounter of 11/06/22   Electrocardiogram 12-Lead   Result Value Ref Range    Systolic Blood Pressure 226      interstitial pulmonary congestion  - last echo found in system in 2020 with no evidence of systolic or diastolic dysfucntion  Plan:  - supplemental O2  - patient follows with Dr Giordano for caridology, would consult in AM  - given lasix 40mg IV x 1 in ED, will redose in AM based off output  - follow up echocardiogram   - continue azithromycin daily  - incentive spirometry  - follow up sputum culture if able to provide    #Atrial fibrillation not on AC  - patient does not want AC due to bleeding risk  Plan:  - will initiate home meds when confirmed by pharmacy    #Hypertension  - hypertensive in ED, given IV hydralazine  Plan:  - will initiate home meds when confirmed by pharmacy  - hydralazine IV PRN    #CKD  - Cr near baseline  Plan:  - monitor Cr daily  - dose lasix daily      Is the patient expected to require at least a two midnight stay in the hospital? YES.   This physician recommends Oumou Subramanian be admitted as an INPATIENT to the medical unit/ICU. The expected LOS exceeds 2 midnights. Reason(s) for INPATIENT CARE include IV antibiotics, IV diuresis, hypoxia, based on severity of presenting sx, comorbidities, and lab/imaging, this patient has an increased risk of adverse outcomes.        GI/DVT Prophylaxis:  SCDs  Code status:  No Order  Time taken coordinating care, review of records, ordering test, talking to consultants and patient is 65 minutes.     Sunita Yun MD  11/6/2022

## 2024-07-29 NOTE — LETTER
7/29/2024      Abiola Matute  [address] [facility] [address]      Dear Colleague,    Thank you for referring your patient, Abiola Matute, to the [facility]. Please see a copy of my visit note below.    Transplant Surgery -OUTPATIENT IMMUNOSUPPRESSION PROGRESS NOTE    Date of Visit: 07/29/2024    Transplants:  6/22/2024 (Liver); Postoperative day:  37  ASSESMENT AND PLAN:  1.Graft Function:Liver allograft: no rejection or technical problems.    2.Immunosuppression Management:keep cyclosporine levels around 200 ng/dL  3.Hypertension:ok  4.Renal Function:better  5.Lab frequency:weekly  6.Other:  No further seizures    Date: July 29, 2024    Transplant:  [x]                             Liver [x]                              Kidney []                             Pancreas []                              Other:             Chief Complaint:Follow Up (Liver transplant 6/22/2024)  Doing welll\    History of Present Illness:  Patient Active Problem List   Diagnosis     Non morbid obesity due to excess calories     Other isolated or specific phobias     Other congenital malformations of mouth     Contact dermatitis and other eczema, due to unspecified cause     Intestinal infection due to Clostridium difficile     Iron deficiency anemia, unspecified iron deficiency anemia type     Rosacea     Atypical ductal hyperplasia of left breast     Idiopathic thrombocytopenia (H)     Postmenopausal- s/p hysterectomy with BSO - not on HRT secondary to atypical ductal hyperplasia & breast pain -no paps needed     Claustrophobia     S/P total hysterectomy and bilateral salpingo-oophorectomy     Abnormal liver enzymes- ? related to ongoing etoh use/abuse     Essential hypertension with goal blood pressure less than 140/90     Gastroenteritis     Stool incontinence     CARDIOVASCULAR SCREENING; LDL GOAL LESS THAN 130     AA (alcohol abuse) - ? ongoing      Alcoholic fatty liver     Acquired  hyperbilirubinemia- ? secondary to alcohol use     Diffuse nodular cirrhosis of liver (H)     Severe Perianal Crohn's Disease     Biliary colic     Cholecystitis     Alcoholic cirrhosis of liver with ascites (H) - seeing [facility]      Abscess of anal or rectal region     Anal fistula     HCC (hepatocellular carcinoma) (H)     Alcohol use disorder, moderate, dependence (H)     Crohn's disease of large intestine with fistula (H)     Ascending aorta dilation (H24)     Hyponatremia     Liver cirrhosis (H)     S/P liver transplant (H)     Immunosuppressed status (H24)     Acute post-operative pain     Insomnia     Anemia due to blood loss, acute     Moderate malnutrition (H24)     RADHA (acute kidney injury) (H24)     Hypomagnesemia     Liver replaced by transplant (H)     Fever, unspecified fever cause     Pleural effusion on right     Pneumonia of right lung due to infectious organism, unspecified part of lung     Xerosis cutis     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      HCC (hepatocellular carcinoma) (H) 02/01/2023     Hypertension      IBS (irritable bowel syndrome)      Seizure disorder (H) 07/09/2024     Status post liver transplantation (H) 06/22/2024     Symmetrical drug-related intertriginous and flexural exanthema 06/30/2024    Secondary to dapsone     Past Surgical History:   Procedure Laterality Date     BENCH  LIVER  6/22/2024    Procedure: Bench liver;  Surgeon: Pravin Ball MD;  Location: [facility] OR     BIOPSY ANAL N/A 3/28/2019    anal biopsy and culure placement of seton - Dr Fleming     BIOPSY BREAST Left 09/17/2010    - scheduled with Dr. Varma      BIOPSY LIVER  2019     COLONOSCOPY  2006     COLONOSCOPY N/A 12/23/2014    Procedure: COMBINED COLONOSCOPY, SINGLE OR MULTIPLE BIOPSY/POLYPECTOMY BY BIOPSY;  Surgeon: Diane Fleming MD;  Location:  GI     COLONOSCOPY N/A 12/5/2019    Procedure: COLONOSCOPY, WITH POLYPECTOMY AND BIOPSY;  Surgeon: Farhan Schilling MD;  Location: [facility]     COLONOSCOPY N/A 2/27/2024    Procedure: COLONOSCOPY, WITH POLYPECTOMY AND BIOPSY;  Surgeon: Michelle Diaz MD;  Location: [facility] OR     ENDOSCOPIC RETROGRADE CHOLANGIOPANCREATOGRAM N/A 4/24/2020    Procedure: ENDOSCOPIC RETROGRADE CHOLANGIOPANCREATOGRAPHY WITH, sledge removal,sphincterotomy, stent in gallbladder and pancreatic duct stent, and balloon dilation;  Surgeon: Guru Isac Kraft MD;  Location: [facility] OR     ESOPHAGOSCOPY, GASTROSCOPY, DUODENOSCOPY (EGD), COMBINED N/A 12/5/2019    Procedure: ESOPHAGOGASTRODUODENOSCOPY (EGD);  Surgeon: Farhan Schilling MD;  Location: [facility]     ESOPHAGOSCOPY, GASTROSCOPY, DUODENOSCOPY (EGD), COMBINED N/A 5/11/2023    Procedure: Esophagoscopy, gastroscopy, duodenoscopy (EGD), combined;  Surgeon: Jeannette Cervantes MD;  Location: [facility]     EXAM UNDER ANESTHESIA ANUS N/A 3/28/2019    Procedure: EXAM UNDER ANESTHESIA ANUS;  Surgeon: Diane Fleming MD;  Location:  OR     EXAM UNDER ANESTHESIA ANUS N/A 2/26/2021    Procedure: EXAM UNDER ANESTHESIA OF ANUS, SETON PLACEMENT, EXCISION OF SKIN BRIDGE;  Surgeon: Avtar Nam MD;  Location: [facility] OR     EXAM UNDER ANESTHESIA ANUS N/A 1/6/2023    Procedure: EXAM UNDER ANESTHESIA, ANUS, SETON EXCHANGE, partial fistulotomy;  Surgeon: Mary Dugan MD;  Location: [facility] OR     HYSTERECTOMY,  VAGINAL  2006    with Dr. Licha Zhou - with BSO for fibroids      IR GALLBLADDER DRAIN PLACEMENT  2020     IR SIRT (SELECTIVE INTERNAL RADIO THERAPY)  2023     IR VISCERAL ANGIOGRAM  2023     IR VISCERAL EMBOLIZATION  2023     LAPAROSCOPIC ABLATION LIVER TUMOR N/A 2023    Procedure: Diagnostic Laparoscopy, Laparoscopic microwave ablation of liver tumor intraoperative ultrasound of liver, lysis of adhesions 1 hour,;  Surgeon: Albino Saavedra MD;  Location: [facility] OR     OPEN REDUCTION INTERNAL FIXATION ANKLE Left at age 27    plates and screws removed at age 36     Pelviscopy with removal of bilateral hydrosalpinges.  04/15/2010     TRANSPLANT LIVER RECIPIENT  DONOR N/A 2024    Procedure: Transplant liver recipient  donor, with biliary stent placement;  Surgeon: Pravin Ball MD;  Location: [facility] OR     ZZC APPENDECTOMY  at age 14     Social History     Socioeconomic History     Marital status:      Spouse name: Albino     Number of children: 3     Years of education: 14     Highest education level: Not on file   Occupational History     Occupation: unemployed   Tobacco Use     Smoking status: Never     Passive exposure: Never     Smokeless tobacco: Never   Vaping Use     Vaping status: Never Used   Substance and Sexual Activity     Alcohol use: Not Currently     Drug use: No     Sexual activity: Yes     Partners: Male     Birth control/protection: Post-menopausal     Comment: husb had vasectomy   Other Topics Concern      Service No     Blood Transfusions No     Caffeine Concern No     Comment: rarely drinks caffeine     Occupational Exposure Not Asked     Hobby Hazards Not Asked     Sleep Concern Not Asked     Stress Concern Not Asked     Weight Concern Not Asked     Special Diet Not Asked     Back Care Not Asked     Exercise Yes     Comment: does a lot of walking - 4x/week     Bike Helmet Not Asked     Seat Belt Yes     Comment: always     Self-Exams  Yes     Comment: SBE encouraged monthly     Parent/sibling w/ CABG, MI or angioplasty before 65F 55M? Yes   Social History Narrative    calcium - drinks 5-6 large glasses skim milk/day    flex sig/colonoscopy -at age 49    sun precautions - discussed    mammogram - needs every 2 years in her 30's, then yearly from then on    Td booster - 9/99 and 4/27/2010    pneumovax -at age 59    DEXA -when perimenopausal    stool hemoccults - every year after age 40    ASA- start at age 39    mulvitamin - encouraged     Social Determinants of Health     Financial Resource Strain: Low Risk  (2/4/2024)    Financial Resource Strain      Within the past 12 months, have you or your family members you live with been unable to get utilities (heat, electricity) when it was really needed?: No   Food Insecurity: Low Risk  (2/4/2024)    Food Insecurity      Within the past 12 months, did you worry that your food would run out before you got money to buy more?: No      Within the past 12 months, did the food you bought just not last and you didn t have money to get more?: No   Transportation Needs: Low Risk  (2/4/2024)    Transportation Needs      Within the past 12 months, has lack of transportation kept you from medical appointments, getting your medicines, non-medical meetings or appointments, work, or from getting things that you need?: No   Physical Activity: Unknown (9/15/2020)    Exercise Vital Sign      Days of Exercise per Week: 0 days      Minutes of Exercise per Session: Not on file   Stress: Stress Concern Present (9/15/2020)    [employer] - Occupational Stress Questionnaire      Feeling of Stress : Rather much   Social Connections: Unknown (9/15/2020)    Social Connection and Isolation Panel [NHANES]      Frequency of Communication with Friends and Family: More than three times a week      Frequency of Social Gatherings with Friends and Family: More than three times a week      Attends Sikh  Services: Not on file      Active Member of Clubs or Organizations: Not on file      Attends Club or Organization Meetings: Not on file      Marital Status: Not on file   Interpersonal Safety: Low Risk  (2/7/2024)    Interpersonal Safety      Do you feel physically and emotionally safe where you currently live?: Yes      Within the past 12 months, have you been hit, slapped, kicked or otherwise physically hurt by someone?: No      Within the past 12 months, have you been humiliated or emotionally abused in other ways by your partner or ex-partner?: No   Housing Stability: Low Risk  (2/4/2024)    Housing Stability      Do you have housing? : Yes      Are you worried about losing your housing?: No     Prescription Medications as of 7/29/2024         Rx Number Disp Refills Start End Last Dispensed Date Next Fill Date Owning Pharmacy    acetaminophen (TYLENOL) 325 MG tablet  60 tablet 0 6/28/2024 --   [address]    Sig: Take 2 tablets (650 mg) by mouth every 6 hours as needed for mild pain    Class: E-Prescribe    Route: Oral    albuterol (PROVENTIL) (2.5 MG/3ML) 0.083% neb solution  90 mL 5 8/2/2024 --   [address]    Sig: Take 1 vial (2.5 mg) by nebulization every 28 days    Class: E-Prescribe    Route: Nebulization    aspirin (ASA) 81 MG chewable tablet  30 tablet 11 7/17/2024 --   [facility] [place] [address] [address]    Sig: Take 1 tablet (81 mg) by mouth or Feeding Tube daily    Class: E-Prescribe    Route: Oral or Feeding Tube    cycloSPORINE modified (GENERIC EQUIVALENT) 100 MG capsule  120 capsule 11 7/25/2024 --   [facility] [place] [address] [address]    Sig: Take 2 capsules (200 mg) by mouth 2 times daily Total dose 200 mg twice daily    Class: E-Prescribe    Route: Oral    cycloSPORINE modified (GENERIC EQUIVALENT) 25 MG  capsule  180 capsule 0 7/25/2024 --   [facility] [place] [id number] [address]    Sig: Take 1 capsule (25 mg) by mouth as needed (dose change)    Class: E-Prescribe    Route: Oral    diphenhydrAMINE (BENADRYL) 50 MG capsule  -- --  --       Sig: Take 50 mg by mouth nightly as needed for sleep    Class: Historical    Route: Oral    emollient (VANICREAM) external cream  453 g 1 7/6/2024 --       Sig: Apply topically every 6 hours as needed for other (Dryness)    Class: No Print Out    Route: Topical    levETIRAcetam (KEPPRA) 1000 MG tablet  90 tablet 1 7/19/2024 --   [facility] [address] [address]    Sig: Take 1 tablet (1,000 mg) by mouth 3 times daily    Class: E-Prescribe    Route: Oral    magnesium oxide (MAG-OX) 400 MG tablet  120 tablet 11 7/17/2024 --   [facility] [address] [address]    Sig: Take 2 tablets (800 mg) by mouth 2 times daily    Class: E-Prescribe    Route: Oral    mycophenolate (GENERIC EQUIVALENT) 250 MG capsule  180 capsule 1 7/17/2024 --   [facility] [address] [address]    Sig: Take 3 capsules (750 mg) by mouth 2 times daily    Class: E-Prescribe    Notes to Pharmacy: TXP DT 6/22/2024 (Liver) TXP Dischg DT 6/28/2024 DX Liver replaced by transplant Z94.4 TX [facility] ([place], [place])    Route: Oral    pantoprazole (PROTONIX) 40 MG EC tablet  30 tablet 11 7/17/2024 --   [facility] [address] [address]    Sig: Take 1 tablet (40 mg) by mouth daily    Class: E-Prescribe    Route: Oral    pentamidine (NEBUPENT) 300 MG neb solution  -- -- 8/2/2024 --       Sig: Inhale 300 mg into the lungs every 28 days    Class: No Print Out    Route: Inhalation    polyethylene glycol (MIRALAX) 17 GM/Dose powder  510 g 0 6/28/2024 --   [facility] [place] [place] -  [address]    Sig: Take 17 g by mouth 2 times daily as needed for constipation    Class: E-Prescribe    Route: Oral    predniSONE (DELTASONE) 5 MG tablet  30 tablet 2 7/17/2024 --   [facility] [place] [address] [address]    Sig: Take 1 tablet (5 mg) by mouth daily    Class: E-Prescribe    Notes to Pharmacy: TXP DT 6/22/2024 (Liver) TXP Dischg DT 6/28/2024 DX Liver replaced by transplant Z94.4 TX Center [facility] ([place], [place])    Route: Oral    psyllium (METAMUCIL) WAFR  60 Wafer 2 7/7/2024 --   [facility] [place], [place] - [address] [place] [address] SE    Sig: Take 2 Wafers by mouth daily    Class: E-Prescribe    Route: Oral    traZODone (DESYREL) 50 MG tablet  30 tablet 0 6/28/2024 --   [facility] [place], [place] - [address] [place] [address] SE    Sig: Take 0.5-1 tablets (25-50 mg) by mouth nightly as needed for sleep    Class: E-Prescribe    Route: Oral    Renewals       Renewal requests to authorizing provider (Philly Webb, NP) <b>prohibited</b>            ursodiol (ACTIGALL) 300 MG capsule  90 capsule 11 7/17/2024 --   [facility] [place] [address] [address]    Sig: Take 1 capsule (300 mg) by mouth or Feeding Tube 3 times daily    Class: E-Prescribe    Route: Oral or Feeding Tube    valGANciclovir (VALCYTE) 450 MG tablet  90 tablet 1 7/19/2024 --   [facility] [place] [address] [address]    Sig: Take 1 tablet (450 mg) by mouth daily    Class: E-Prescribe    Route: Oral          Clinic-Administered Medications as of 7/29/2024         Dose Frequency Start End    hyoscyamine (LEVSIN/SL) sublingual tablet 125 mcg 125 mcg EVERY 4 HOURS PRN 6/16/2022 --    Admin Instructions: Patients using antacids should take hyoscyamine before meals and the antacid after meals.    Route: Sublingual          Blood transfusion related (informational only),  Dapsone, Fish oil, Metronidazole, Ppd [tuberculin purified protein derivative], Sulfa antibiotics, and Terbinafine and related   REVIEW OF SYSTEMS (check box if normal)  [x]               GENERAL  [x]                 PULMONARY [x]                GENITOURINARY  [x]                CNS                 [x]                 CARDIAC  [x]                 ENDOCRINE  [x]                EARS,NOSE,THROAT [x]                 GASTROINTESTINAL [x]                 NEUROLOGIC    [x]                MUSCLOSKELTAL  [x]                  HEMATOLOGY      PHYSICAL EXAM (check box if normal)BP (!) 145/90 (BP Location: Right arm, Patient Position: Sitting, Cuff Size: Adult Large)   Pulse 87   Temp 98.1  F (36.7  C) (Oral)   Resp 16   Wt 77.8 kg (171 lb 8 oz)   LMP 05/31/2006   SpO2 98%   BMI 28.54 kg/m          [x]            GENERAL:    [x]       EYES:  ICTERIC   []        YES  []                    NO  [x]           EXTREMITIES: Clubbing []       Y     [x]           N    [x]           EARS, NOSE, THROAT: Membranes Moist    YES   [x]                   NO []                  [x]           LUNGS:  CLEAR    YES       [x]                  NO    []                                [x]           SKIN: Jaundice           YES       []                  NO    [x]                   Rash: YES       []                  NO    [x]                                     [x]             HEART: Regular Rate          YES       [x]                  NO    []                   Incision Clean:  YES       [x]                  NO    []                                [x]                    ABDOMEN: Organomegaly YES       []                  NO    [x]                       [x]                    NEUROLOGICAL:  Nonfocal  YES       [x]                  NO    []                       [x]                    Hernia YES       []                  NO    [x]                   PSYCHIATRIC:  Appropriate  YES       [x]                  NO    []                       OTHER:                                                                                                    PAIN SCALE:: 3       Again, thank you for allowing me to participate in the care of your patient.        Sincerely,        Pravin Ball MD

## 2024-07-29 NOTE — LETTER
7/29/2024      Abiola Matute  [address] [address]      Dear Colleague,    Thank you for referring your patient, Abiola Matute, to the [facility]PL[facility]. Please see a copy of my visit note below.    [facility] SOLID ORGAN TRANSPLANT  OUTPATIENT MNT: POST LIVER TXP    TIME SPENT: 15 minutes  VISIT TYPE: follow up (saw pt for pre txp eval 2/2023)  REFERRING PHYSICIAN: Quinn   PT ACCOMPANIED BY: her spouse    Date of txp: 6/22/24    NUTRITION ASSESSMENT // DIET RECALL  Pt reports fairly good appetite, eating small/frequent meals. She is really focusing on protein intake, averaging ~90 g/day.     Eating Greek yogurt and fruit, eggs, english muffin with egg and BF meat, burger, ham salad, chicken.   Lory- water/seltzer, occasional juice    Protein Supplements: smoothie with Ensure High Protein + whey protein; whey protein powder in other foods     NUTRITION DIAGNOSIS   Predicted suboptimal protein intake related to increased protein needs s/p transplant.    NUTRITION INTERVENTION   1. Discussed importance of consuming adequate calories and protein for 2 months post-txp to help heal and reduce muscle/fat wasting. Discussed sources of protein and ways to ensure she is increasing her protein intake.    2. Reviewed importance of food safety and increased risk for food-borne illness post-txp. Also discussed potential need to monitor K+, CHO, and Na+ intakes d/t medication side effects.     3. Avoid the following post txp d/t risk for rejection, unknown effects on the organs, and/or potential interactions with immunosuppressants:  - Herbal, Chinese, holistic, chiropractic, natural, alternative medicines and supplements  - Detoxes and cleanses  - Weight loss pills  - Protein powders or other products with extracts or herbs (ie green tea extract)    Patient Understanding: Pt verbalized understanding of education provided.  Expected Engagement: Good  Follow-Up Plans: PRN      NUTRITION GOALS   Continue with high protein diet x 2 months post txp     Amber Ortega RD, LD, CCTD      Again, thank you for allowing me to participate in the care of your patient.        Sincerely,        Amber Ortega RD

## 2024-07-29 NOTE — PROGRESS NOTES
Transplant Surgery -OUTPATIENT IMMUNOSUPPRESSION PROGRESS NOTE    Date of Visit: 07/29/2024    Transplants:  6/22/2024 (Liver); Postoperative day:  37  ASSESMENT AND PLAN:  1.Graft Function:Liver allograft: no rejection or technical problems.    2.Immunosuppression Management:keep cyclosporine levels around 200 ng/dL  3.Hypertension:ok  4.Renal Function:better  5.Lab frequency:weekly  6.Other:  No further seizures    Date: July 29, 2024    Transplant:  [x]                             Liver [x]                              Kidney []                             Pancreas []                              Other:             Chief Complaint:Follow Up (Liver transplant 6/22/2024)  Doing welll\    History of Present Illness:  Patient Active Problem List   Diagnosis    Non morbid obesity due to excess calories    Other isolated or specific phobias    Other congenital malformations of mouth    Contact dermatitis and other eczema, due to unspecified cause    Intestinal infection due to Clostridium difficile    Iron deficiency anemia, unspecified iron deficiency anemia type    Rosacea    Atypical ductal hyperplasia of left breast    Idiopathic thrombocytopenia (H)    Postmenopausal- s/p hysterectomy with BSO - not on HRT secondary to atypical ductal hyperplasia & breast pain -no paps needed    Claustrophobia    S/P total hysterectomy and bilateral salpingo-oophorectomy    Abnormal liver enzymes- ? related to ongoing etoh use/abuse    Essential hypertension with goal blood pressure less than 140/90    Gastroenteritis    Stool incontinence    CARDIOVASCULAR SCREENING; LDL GOAL LESS THAN 130    AA (alcohol abuse) - ? ongoing     Alcoholic fatty liver    Acquired hyperbilirubinemia- ? secondary to alcohol use    Diffuse nodular cirrhosis of liver (H)    Severe Perianal Crohn's Disease    Biliary colic    Cholecystitis    Alcoholic cirrhosis of liver with ascites (H) - seeing [facility]     Abscess of anal or rectal region    Anal fistula     HCC (hepatocellular carcinoma) (H)    Alcohol use disorder, moderate, dependence (H)    Crohn's disease of large intestine with fistula (H)    Ascending aorta dilation (H24)    Hyponatremia    Liver cirrhosis (H)    S/P liver transplant (H)    Immunosuppressed status (H24)    Acute post-operative pain    Insomnia    Anemia due to blood loss, acute    Moderate malnutrition (H24)    RADHA (acute kidney injury) (H24)    Hypomagnesemia    Liver replaced by transplant (H)    Fever, unspecified fever cause    Pleural effusion on right    Pneumonia of right lung due to infectious organism, unspecified part of lung    Xerosis cutis    Symmetrical drug-related intertriginous and flexural exanthema    Hypervolemia    Hypocalcemia    Seizure (H)    Seizures (H)     SOCIAL /FAMILY HISTORY: [x]                  No recent change    Past Medical History:   Diagnosis Date    Alcohol abuse     Alcoholic cirrhosis of liver with ascites     Anal fistula     Atypical ductal hyperplasia of breast 09/10/2010    ERT not recommended -left - and flat epithelial atypia-scheduled for breast biopsy 9/17/2010     Bifid uvula     Cholelithiasis     Contact perianal dermatitis and other eczema     recurrent - clobetasol     Crohn disease     Fear of flying      - gets Ativan prn.     HCC (hepatocellular carcinoma) (H) 02/01/2023    Hypertension     IBS (irritable bowel syndrome)     Seizure disorder (H) 07/09/2024    Status post liver transplantation (H) 06/22/2024    Symmetrical drug-related intertriginous and flexural exanthema 06/30/2024    Secondary to dapsone     Past Surgical History:   Procedure Laterality Date    BENCH LIVER  6/22/2024    Procedure: Bench liver;  Surgeon: Pravin Ball MD;  Location: [facility] OR    BIOPSY ANAL N/A 3/28/2019    anal biopsy and culure placement of seton - Dr Fleming    BIOPSY BREAST Left 09/17/2010    - scheduled with Dr. Varma     BIOPSY LIVER  2019    COLONOSCOPY  2006    COLONOSCOPY N/A 12/23/2014     Procedure: COMBINED COLONOSCOPY, SINGLE OR MULTIPLE BIOPSY/POLYPECTOMY BY BIOPSY;  Surgeon: Diane Fleming MD;  Location:  GI    COLONOSCOPY N/A 12/5/2019    Procedure: COLONOSCOPY, WITH POLYPECTOMY AND BIOPSY;  Surgeon: Farhan Schilling MD;  Location: [facility] GI    COLONOSCOPY N/A 2/27/2024    Procedure: COLONOSCOPY, WITH POLYPECTOMY AND BIOPSY;  Surgeon: Michelle Diaz MD;  Location: [facility] OR    ENDOSCOPIC RETROGRADE CHOLANGIOPANCREATOGRAM N/A 4/24/2020    Procedure: ENDOSCOPIC RETROGRADE CHOLANGIOPANCREATOGRAPHY WITH, sledge removal,sphincterotomy, stent in gallbladder and pancreatic duct stent, and balloon dilation;  Surgeon: Guru Isac Kraft MD;  Location: [facility] OR    ESOPHAGOSCOPY, GASTROSCOPY, DUODENOSCOPY (EGD), COMBINED N/A 12/5/2019    Procedure: ESOPHAGOGASTRODUODENOSCOPY (EGD);  Surgeon: Farhan Schilling MD;  Location: [facility] GI    ESOPHAGOSCOPY, GASTROSCOPY, DUODENOSCOPY (EGD), COMBINED N/A 5/11/2023    Procedure: Esophagoscopy, gastroscopy, duodenoscopy (EGD), combined;  Surgeon: Jeannette Cervantes MD;  Location: [facility] GI    EXAM UNDER ANESTHESIA ANUS N/A 3/28/2019    Procedure: EXAM UNDER ANESTHESIA ANUS;  Surgeon: Diane Fleming MD;  Location:  OR    EXAM UNDER ANESTHESIA ANUS N/A 2/26/2021    Procedure: EXAM UNDER ANESTHESIA OF ANUS, SETON PLACEMENT, EXCISION OF SKIN BRIDGE;  Surgeon: Avtar Nam MD;  Location: [facility] OR    EXAM UNDER ANESTHESIA ANUS N/A 1/6/2023    Procedure: EXAM UNDER ANESTHESIA, ANUS, SETON EXCHANGE, partial fistulotomy;  Surgeon: Mary Dugan MD;  Location: [facility] OR    HYSTERECTOMY, VAGINAL  2006    with Dr. Licha Zhou - with BSO for fibroids     IR GALLBLADDER DRAIN PLACEMENT  4/22/2020    IR SIRT (SELECTIVE INTERNAL RADIO THERAPY)  2/21/2023    IR VISCERAL ANGIOGRAM  2/21/2023    IR VISCERAL EMBOLIZATION  2/27/2023    LAPAROSCOPIC ABLATION LIVER TUMOR N/A 8/29/2023    Procedure: Diagnostic Laparoscopy, Laparoscopic  microwave ablation of liver tumor intraoperative ultrasound of liver, lysis of adhesions 1 hour,;  Surgeon: Albino Saavedra MD;  Location: [facility] OR    OPEN REDUCTION INTERNAL FIXATION ANKLE Left at age 27    plates and screws removed at age 36    Pelviscopy with removal of bilateral hydrosalpinges.  04/15/2010    TRANSPLANT LIVER RECIPIENT  DONOR N/A 2024    Procedure: Transplant liver recipient  donor, with biliary stent placement;  Surgeon: Pravin Ball MD;  Location: [facility] OR    ZZC APPENDECTOMY  at age 14     Social History     Socioeconomic History    Marital status:      Spouse name: Albino    Number of children: 3    Years of education: 14    Highest education level: Not on file   Occupational History    Occupation: unemployed   Tobacco Use    Smoking status: Never     Passive exposure: Never    Smokeless tobacco: Never   Vaping Use    Vaping status: Never Used   Substance and Sexual Activity    Alcohol use: Not Currently    Drug use: No    Sexual activity: Yes     Partners: Male     Birth control/protection: Post-menopausal     Comment: husb had vasectomy   Other Topics Concern     Service No    Blood Transfusions No    Caffeine Concern No     Comment: rarely drinks caffeine    Occupational Exposure Not Asked    Hobby Hazards Not Asked    Sleep Concern Not Asked    Stress Concern Not Asked    Weight Concern Not Asked    Special Diet Not Asked    Back Care Not Asked    Exercise Yes     Comment: does a lot of walking - 4x/week    Bike Helmet Not Asked    Seat Belt Yes     Comment: always    Self-Exams Yes     Comment: SBE encouraged monthly    Parent/sibling w/ CABG, MI or angioplasty before 65F 55M? Yes   Social History Narrative    calcium - drinks 5-6 large glasses skim milk/day    flex sig/colonoscopy -at age 49    sun precautions - discussed    mammogram - needs every 2 years in her 30's, then yearly from then on    Td booster -  and 2010    pneumovax -at  age 59    DEXA -when perimenopausal    stool hemoccults - every year after age 40    ASA- start at age 39    mulvitamin - encouraged     Social Determinants of Health     Financial Resource Strain: Low Risk  (2/4/2024)    Financial Resource Strain     Within the past 12 months, have you or your family members you live with been unable to get utilities (heat, electricity) when it was really needed?: No   Food Insecurity: Low Risk  (2/4/2024)    Food Insecurity     Within the past 12 months, did you worry that your food would run out before you got money to buy more?: No     Within the past 12 months, did the food you bought just not last and you didn t have money to get more?: No   Transportation Needs: Low Risk  (2/4/2024)    Transportation Needs     Within the past 12 months, has lack of transportation kept you from medical appointments, getting your medicines, non-medical meetings or appointments, work, or from getting things that you need?: No   Physical Activity: Unknown (9/15/2020)    Exercise Vital Sign     Days of Exercise per Week: 0 days     Minutes of Exercise per Session: Not on file   Stress: Stress Concern Present (9/15/2020)    [place] [employer] - Occupational Stress Questionnaire     Feeling of Stress : Rather much   Social Connections: Unknown (9/15/2020)    Social Connection and Isolation Panel [NHANES]     Frequency of Communication with Friends and Family: More than three times a week     Frequency of Social Gatherings with Friends and Family: More than three times a week     Attends Anabaptism Services: Not on file     Active Member of Clubs or Organizations: Not on file     Attends Club or Organization Meetings: Not on file     Marital Status: Not on file   Interpersonal Safety: Low Risk  (2/7/2024)    Interpersonal Safety     Do you feel physically and emotionally safe where you currently live?: Yes     Within the past 12 months, have you been hit, slapped, kicked or  otherwise physically hurt by someone?: No     Within the past 12 months, have you been humiliated or emotionally abused in other ways by your partner or ex-partner?: No   Housing Stability: Low Risk  (2/4/2024)    Housing Stability     Do you have housing? : Yes     Are you worried about losing your housing?: No     Prescription Medications as of 7/29/2024         Rx Number Disp Refills Start End Last Dispensed Date Next Fill Date Owning Pharmacy    acetaminophen (TYLENOL) 325 MG tablet  60 tablet 0 6/28/2024 --   [address]    Sig: Take 2 tablets (650 mg) by mouth every 6 hours as needed for mild pain    Class: E-Prescribe    Route: Oral    albuterol (PROVENTIL) (2.5 MG/3ML) 0.083% neb solution  90 mL 5 8/2/2024 --   [address]    Sig: Take 1 vial (2.5 mg) by nebulization every 28 days    Class: E-Prescribe    Route: Nebulization    aspirin (ASA) 81 MG chewable tablet  30 tablet 11 7/17/2024 --   [facility]/[address]    Sig: Take 1 tablet (81 mg) by mouth or Feeding Tube daily    Class: E-Prescribe    Route: Oral or Feeding Tube    cycloSPORINE modified (GENERIC EQUIVALENT) 100 MG capsule  120 capsule 11 7/25/2024 --   [facility] [place] [address]    Sig: Take 2 capsules (200 mg) by mouth 2 times daily Total dose 200 mg twice daily    Class: E-Prescribe    Route: Oral    cycloSPORINE modified (GENERIC EQUIVALENT) 25 MG capsule  180 capsule 0 7/25/2024 --   [facility] [place] [address]    Sig: Take 1 capsule (25 mg) by mouth as needed (dose change)    Class: E-Prescribe    Route: Oral    diphenhydrAMINE (BENADRYL) 50 MG capsule  -- --  --       Sig: Take 50 mg by mouth nightly as needed for sleep    Class: Historical    Route: Oral    emollient (VANICREAM) external  cream  453 g 1 7/6/2024 --       Sig: Apply topically every 6 hours as needed for other (Dryness)    Class: No Print Out    Route: Topical    levETIRAcetam (KEPPRA) 1000 MG tablet  90 tablet 1 7/19/2024 --   [facility] [place] [address]    Sig: Take 1 tablet (1,000 mg) by mouth 3 times daily    Class: E-Prescribe    Route: Oral    magnesium oxide (MAG-OX) 400 MG tablet  120 tablet 11 7/17/2024 --   [facility] [place] [address]    Sig: Take 2 tablets (800 mg) by mouth 2 times daily    Class: E-Prescribe    Route: Oral    mycophenolate (GENERIC EQUIVALENT) 250 MG capsule  180 capsule 1 7/17/2024 --   [facility] [place] [address]    Sig: Take 3 capsules (750 mg) by mouth 2 times daily    Class: E-Prescribe    Notes to Pharmacy: TXP DT 6/22/2024 (Liver) TXP Dischg DT 6/28/2024 DX Liver replaced by transplant [facility] [place]    Route: Oral    pantoprazole (PROTONIX) 40 MG EC tablet  30 tablet 11 7/17/2024 --   [facility] [place] [address]    Sig: Take 1 tablet (40 mg) by mouth daily    Class: E-Prescribe    Route: Oral    pentamidine (NEBUPENT) 300 MG neb solution  -- -- 8/2/2024 --       Sig: Inhale 300 mg into the lungs every 28 days    Class: No Print Out    Route: Inhalation    polyethylene glycol (MIRALAX) 17 GM/Dose powder  510 g 0 6/28/2024 --   [facility] [place] [place] - [address]    Sig: Take 17 g by mouth 2 times daily as needed for constipation    Class: E-Prescribe    Route: Oral    predniSONE (DELTASONE) 5 MG tablet  30 tablet 2 7/17/2024 --   [facility] [place] [address]    Sig: Take 1 tablet (5 mg) by mouth daily    Class: E-Prescribe    Notes to Pharmacy: TXP DT 6/22/2024 (Liver) TXP Dischg DT  6/28/2024 DX Liver replaced by transplant Z94.4 [facility] [place] ([place], [place])    Route: Oral    psyllium (METAMUCIL) WAFR  60 Wafer 2 7/7/2024 --   [facility] [place], [place] - [address] [place] [address]    Sig: Take 2 Wafers by mouth daily    Class: E-Prescribe    Route: Oral    traZODone (DESYREL) 50 MG tablet  30 tablet 0 6/28/2024 --   [facility] - [address]    Sig: Take 0.5-1 tablets (25-50 mg) by mouth nightly as needed for sleep    Class: E-Prescribe    Route: Oral    Renewals       Renewal requests to authorizing provider (Philly Webb NP) <b>prohibited</b>            ursodiol (ACTIGALL) 300 MG capsule  90 capsule 11 7/17/2024 --   [facility] [place] [address]    Sig: Take 1 capsule (300 mg) by mouth or Feeding Tube 3 times daily    Class: E-Prescribe    Route: Oral or Feeding Tube    valGANciclovir (VALCYTE) 450 MG tablet  90 tablet 1 7/19/2024 --   [facility] [place] [address]    Sig: Take 1 tablet (450 mg) by mouth daily    Class: E-Prescribe    Route: Oral          Clinic-Administered Medications as of 7/29/2024         Dose Frequency Start End    hyoscyamine (LEVSIN/SL) sublingual tablet 125 mcg 125 mcg EVERY 4 HOURS PRN 6/16/2022 --    Admin Instructions: Patients using antacids should take hyoscyamine before meals and the antacid after meals.    Route: Sublingual          Blood transfusion related (informational only), Dapsone, Fish oil, Metronidazole, Ppd [tuberculin purified protein derivative], Sulfa antibiotics, and Terbinafine and related   REVIEW OF SYSTEMS (check box if normal)  [x]               GENERAL  [x]                 PULMONARY [x]                GENITOURINARY  [x]                CNS                 [x]                 CARDIAC  [x]                 ENDOCRINE  [x]                 EARS,NOSE,THROAT [x]                 GASTROINTESTINAL [x]                 NEUROLOGIC    [x]                MUSCLOSKELTAL  [x]                  HEMATOLOGY      PHYSICAL EXAM (check box if normal)BP (!) 145/90 (BP Location: Right arm, Patient Position: Sitting, Cuff Size: Adult Large)   Pulse 87   Temp 98.1  F (36.7  C) (Oral)   Resp 16   Wt 77.8 kg (171 lb 8 oz)   LMP 05/31/2006   SpO2 98%   BMI 28.54 kg/m          [x]            GENERAL:    [x]       EYES:  ICTERIC   []        YES  []                    NO  [x]           EXTREMITIES: Clubbing []       Y     [x]           N    [x]           EARS, NOSE, THROAT: Membranes Moist    YES   [x]                   NO []                  [x]           LUNGS:  CLEAR    YES       [x]                  NO    []                                [x]           SKIN: Jaundice           YES       []                  NO    [x]                   Rash: YES       []                  NO    [x]                                     [x]             HEART: Regular Rate          YES       [x]                  NO    []                   Incision Clean:  YES       [x]                  NO    []                                [x]                    ABDOMEN: Organomegaly YES       []                  NO    [x]                       [x]                    NEUROLOGICAL:  Nonfocal  YES       [x]                  NO    []                       [x]                    Hernia YES       []                  NO    [x]                   PSYCHIATRIC:  Appropriate  YES       [x]                  NO    []                       OTHER:                                                                                                   PAIN SCALE:: 3

## 2024-07-29 NOTE — PROGRESS NOTES
[facility] SOLID ORGAN TRANSPLANT  OUTPATIENT MNT: POST LIVER TXP    TIME SPENT: 15 minutes  VISIT TYPE: follow up (saw pt for pre txp eval 2/2023)  REFERRING PHYSICIAN: Quinn   PT ACCOMPANIED BY: her spouse    Date of txp: 6/22/24    NUTRITION ASSESSMENT // DIET RECALL  Pt reports fairly good appetite, eating small/frequent meals. She is really focusing on protein intake, averaging ~90 g/day.     Eating Greek yogurt and fruit, eggs, english muffin with egg and BF meat, burger, ham salad, chicken.   Lory- water/seltzer, occasional juice    Protein Supplements: smoothie with Ensure High Protein + whey protein; whey protein powder in other foods     NUTRITION DIAGNOSIS   Predicted suboptimal protein intake related to increased protein needs s/p transplant.    NUTRITION INTERVENTION   1. Discussed importance of consuming adequate calories and protein for 2 months post-txp to help heal and reduce muscle/fat wasting. Discussed sources of protein and ways to ensure she is increasing her protein intake.    2. Reviewed importance of food safety and increased risk for food-borne illness post-txp. Also discussed potential need to monitor K+, CHO, and Na+ intakes d/t medication side effects.     3. Avoid the following post txp d/t risk for rejection, unknown effects on the organs, and/or potential interactions with immunosuppressants:  - Herbal, Chinese, holistic, chiropractic, natural, alternative medicines and supplements  - Detoxes and cleanses  - Weight loss pills  - Protein powders or other products with extracts or herbs (ie green tea extract)    Patient Understanding: Pt verbalized understanding of education provided.  Expected Engagement: Good  Follow-Up Plans: PRN     NUTRITION GOALS   Continue with high protein diet x 2 months post txp     Amber Ortega, RD, LD, CCTD

## 2024-07-30 ENCOUNTER — MEDICAL CORRESPONDENCE (OUTPATIENT)
Dept: HEALTH INFORMATION MANAGEMENT | Facility: CLINIC | Age: 59
End: 2024-07-30
Payer: MEDICARE

## 2024-07-30 DIAGNOSIS — G47.00 INSOMNIA, UNSPECIFIED TYPE: ICD-10-CM

## 2024-07-30 DIAGNOSIS — Z94.4 S/P LIVER TRANSPLANT (H): ICD-10-CM

## 2024-07-30 RX ORDER — MYCOPHENOLATE MOFETIL 250 MG/1
750 CAPSULE ORAL 2 TIMES DAILY
Qty: 180 CAPSULE | Refills: 1 | Status: SHIPPED | OUTPATIENT
Start: 2024-07-30 | End: 2024-09-20

## 2024-07-30 RX ORDER — TRAZODONE HYDROCHLORIDE 50 MG/1
25-50 TABLET, FILM COATED ORAL
Qty: 30 TABLET | Refills: 2 | Status: SHIPPED | OUTPATIENT
Start: 2024-07-30

## 2024-07-30 NOTE — TELEPHONE ENCOUNTER
[phone]    Order #[id number]     Copied into HIMS / Filed in South / Noemi PAN

## 2024-07-30 NOTE — TELEPHONE ENCOUNTER
[phone]    Order #[id number]    Copied into HIMS / Filed in [place] / Noemi PAN

## 2024-07-31 LAB — BACTERIA PLR CULT: NO GROWTH

## 2024-08-01 ENCOUNTER — TRANSFERRED RECORDS (OUTPATIENT)
Dept: HEALTH INFORMATION MANAGEMENT | Facility: CLINIC | Age: 59
End: 2024-08-01

## 2024-08-01 ENCOUNTER — LAB REQUISITION (OUTPATIENT)
Dept: LAB | Facility: CLINIC | Age: 59
End: 2024-08-01
Payer: MEDICARE

## 2024-08-01 DIAGNOSIS — Z20.828 CONTACT WITH AND (SUSPECTED) EXPOSURE TO OTHER VIRAL COMMUNICABLE DISEASES: ICD-10-CM

## 2024-08-01 DIAGNOSIS — Z79.899 OTHER LONG TERM (CURRENT) DRUG THERAPY: ICD-10-CM

## 2024-08-01 DIAGNOSIS — Z48.288 ENCOUNTER FOR AFTERCARE FOLLOWING MULTIPLE ORGAN TRANSPLANT: ICD-10-CM

## 2024-08-01 DIAGNOSIS — Z94.4 LIVER TRANSPLANT STATUS (H): ICD-10-CM

## 2024-08-01 LAB
ALBUMIN SERPL BCG-MCNC: 3.6 G/DL (ref 3.5–5.2)
ALP SERPL-CCNC: 165 U/L (ref 40–150)
ALT SERPL W P-5'-P-CCNC: 15 U/L (ref 0–50)
ANION GAP SERPL CALCULATED.3IONS-SCNC: 11 MMOL/L (ref 7–15)
AST SERPL W P-5'-P-CCNC: 18 U/L (ref 0–45)
BILIRUB DIRECT SERPL-MCNC: 0.22 MG/DL (ref 0–0.3)
BILIRUB SERPL-MCNC: 0.6 MG/DL
BUN SERPL-MCNC: 24.1 MG/DL (ref 8–23)
CALCIUM SERPL-MCNC: 9 MG/DL (ref 8.8–10.4)
CHLORIDE SERPL-SCNC: 101 MMOL/L (ref 98–107)
CREAT SERPL-MCNC: 0.68 MG/DL (ref 0.51–0.95)
CYCLOSPORINE BLD LC/MS/MS-MCNC: 180 UG/L (ref 50–400)
EGFRCR SERPLBLD CKD-EPI 2021: >90 ML/MIN/1.73M2
ERYTHROCYTE [DISTWIDTH] IN BLOOD BY AUTOMATED COUNT: 16.4 % (ref 10–15)
GLUCOSE SERPL-MCNC: 98 MG/DL (ref 70–99)
HCO3 SERPL-SCNC: 23 MMOL/L (ref 22–29)
HCT VFR BLD AUTO: 29.6 % (ref 35–47)
HGB BLD-MCNC: 9.4 G/DL (ref 11.7–15.7)
MAGNESIUM SERPL-MCNC: 1.7 MG/DL (ref 1.7–2.3)
MCH RBC QN AUTO: 30.2 PG (ref 26.5–33)
MCHC RBC AUTO-ENTMCNC: 31.8 G/DL (ref 31.5–36.5)
MCV RBC AUTO: 95 FL (ref 78–100)
PHOSPHATE SERPL-MCNC: 4.2 MG/DL (ref 2.5–4.5)
PLATELET # BLD AUTO: 305 10E3/UL (ref 150–450)
POTASSIUM SERPL-SCNC: 4.2 MMOL/L (ref 3.4–5.3)
PROT SERPL-MCNC: 7.9 G/DL (ref 6.4–8.3)
RBC # BLD AUTO: 3.11 10E6/UL (ref 3.8–5.2)
SODIUM SERPL-SCNC: 135 MMOL/L (ref 135–145)
TME LAST DOSE: NORMAL H
TME LAST DOSE: NORMAL H
WBC # BLD AUTO: 6 10E3/UL (ref 4–11)

## 2024-08-01 PROCEDURE — 85027 COMPLETE CBC AUTOMATED: CPT | Mod: ORL | Performed by: NURSE PRACTITIONER

## 2024-08-01 PROCEDURE — 83735 ASSAY OF MAGNESIUM: CPT | Mod: ORL | Performed by: NURSE PRACTITIONER

## 2024-08-01 PROCEDURE — 82248 BILIRUBIN DIRECT: CPT | Mod: ORL | Performed by: NURSE PRACTITIONER

## 2024-08-01 PROCEDURE — 84100 ASSAY OF PHOSPHORUS: CPT | Mod: ORL | Performed by: NURSE PRACTITIONER

## 2024-08-01 PROCEDURE — 80053 COMPREHEN METABOLIC PANEL: CPT | Mod: ORL | Performed by: NURSE PRACTITIONER

## 2024-08-01 PROCEDURE — 80158 DRUG ASSAY CYCLOSPORINE: CPT | Mod: ORL | Performed by: NURSE PRACTITIONER

## 2024-08-02 ENCOUNTER — TELEPHONE (OUTPATIENT)
Dept: TRANSPLANT | Facility: CLINIC | Age: 59
End: 2024-08-02
Payer: MEDICARE

## 2024-08-05 ENCOUNTER — LAB (OUTPATIENT)
Dept: LAB | Facility: CLINIC | Age: 59
End: 2024-08-05
Attending: INTERNAL MEDICINE
Payer: MEDICARE

## 2024-08-05 DIAGNOSIS — F10.11 ALCOHOL ABUSE, IN REMISSION: ICD-10-CM

## 2024-08-05 DIAGNOSIS — K70.31 ALCOHOLIC CIRRHOSIS OF LIVER WITH ASCITES (H): ICD-10-CM

## 2024-08-05 DIAGNOSIS — C22.0 HCC (HEPATOCELLULAR CARCINOMA) (H): ICD-10-CM

## 2024-08-05 DIAGNOSIS — Z94.4 S/P LIVER TRANSPLANT (H): ICD-10-CM

## 2024-08-05 DIAGNOSIS — Z94.4 LIVER REPLACED BY TRANSPLANT (H): ICD-10-CM

## 2024-08-05 DIAGNOSIS — K50.113 CROHN'S DISEASE OF LARGE INTESTINE WITH FISTULA (H): Primary | ICD-10-CM

## 2024-08-05 LAB
ALBUMIN SERPL BCG-MCNC: 3.9 G/DL (ref 3.5–5.2)
ALP SERPL-CCNC: 178 U/L (ref 40–150)
ALT SERPL W P-5'-P-CCNC: 17 U/L (ref 0–50)
ANION GAP SERPL CALCULATED.3IONS-SCNC: 10 MMOL/L (ref 7–15)
AST SERPL W P-5'-P-CCNC: 19 U/L (ref 0–45)
BILIRUB DIRECT SERPL-MCNC: 0.24 MG/DL (ref 0–0.3)
BILIRUB SERPL-MCNC: 0.7 MG/DL
BUN SERPL-MCNC: 26.8 MG/DL (ref 8–23)
CALCIUM SERPL-MCNC: 9.5 MG/DL (ref 8.8–10.4)
CHLORIDE SERPL-SCNC: 100 MMOL/L (ref 98–107)
CREAT SERPL-MCNC: 0.77 MG/DL (ref 0.51–0.95)
CYCLOSPORINE BLD LC/MS/MS-MCNC: 216 UG/L (ref 50–400)
EGFRCR SERPLBLD CKD-EPI 2021: 88 ML/MIN/1.73M2
ERYTHROCYTE [DISTWIDTH] IN BLOOD BY AUTOMATED COUNT: 16.1 % (ref 10–15)
GLUCOSE SERPL-MCNC: 101 MG/DL (ref 70–99)
HCO3 SERPL-SCNC: 25 MMOL/L (ref 22–29)
HCT VFR BLD AUTO: 32 % (ref 35–47)
HGB BLD-MCNC: 10.3 G/DL (ref 11.7–15.7)
MAGNESIUM SERPL-MCNC: 1.9 MG/DL (ref 1.7–2.3)
MCH RBC QN AUTO: 30 PG (ref 26.5–33)
MCHC RBC AUTO-ENTMCNC: 32.2 G/DL (ref 31.5–36.5)
MCV RBC AUTO: 93 FL (ref 78–100)
PHOSPHATE SERPL-MCNC: 4.3 MG/DL (ref 2.5–4.5)
PLATELET # BLD AUTO: 307 10E3/UL (ref 150–450)
POTASSIUM SERPL-SCNC: 4.6 MMOL/L (ref 3.4–5.3)
PROT SERPL-MCNC: 8.3 G/DL (ref 6.4–8.3)
RBC # BLD AUTO: 3.43 10E6/UL (ref 3.8–5.2)
SODIUM SERPL-SCNC: 135 MMOL/L (ref 135–145)
TME LAST DOSE: NORMAL H
TME LAST DOSE: NORMAL H
WBC # BLD AUTO: 6.5 10E3/UL (ref 4–11)

## 2024-08-05 PROCEDURE — 94640 AIRWAY INHALATION TREATMENT: CPT | Performed by: INTERNAL MEDICINE

## 2024-08-05 PROCEDURE — 83735 ASSAY OF MAGNESIUM: CPT | Performed by: PATHOLOGY

## 2024-08-05 PROCEDURE — 94642 AEROSOL INHALATION TREATMENT: CPT | Performed by: INTERNAL MEDICINE

## 2024-08-05 PROCEDURE — 82248 BILIRUBIN DIRECT: CPT | Performed by: PATHOLOGY

## 2024-08-05 PROCEDURE — 84100 ASSAY OF PHOSPHORUS: CPT | Performed by: PATHOLOGY

## 2024-08-05 PROCEDURE — 80053 COMPREHEN METABOLIC PANEL: CPT | Performed by: PATHOLOGY

## 2024-08-05 PROCEDURE — G0480 DRUG TEST DEF 1-7 CLASSES: HCPCS | Mod: 90 | Performed by: PATHOLOGY

## 2024-08-05 PROCEDURE — 36415 COLL VENOUS BLD VENIPUNCTURE: CPT | Performed by: PATHOLOGY

## 2024-08-05 PROCEDURE — 85027 COMPLETE CBC AUTOMATED: CPT | Performed by: PATHOLOGY

## 2024-08-05 PROCEDURE — 80158 DRUG ASSAY CYCLOSPORINE: CPT | Performed by: TRANSPLANT SURGERY

## 2024-08-05 PROCEDURE — 99000 SPECIMEN HANDLING OFFICE-LAB: CPT | Performed by: PATHOLOGY

## 2024-08-05 RX ORDER — EPINEPHRINE 1 MG/ML
0.3 INJECTION, SOLUTION, CONCENTRATE INTRAVENOUS EVERY 5 MIN PRN
OUTPATIENT
Start: 2024-09-02

## 2024-08-05 RX ORDER — ALBUTEROL SULFATE 0.83 MG/ML
2.5 SOLUTION RESPIRATORY (INHALATION) ONCE
Status: CANCELLED
Start: 2024-09-02 | End: 2024-09-02

## 2024-08-05 RX ORDER — ALBUTEROL SULFATE 90 UG/1
1-2 AEROSOL, METERED RESPIRATORY (INHALATION)
Start: 2024-09-02

## 2024-08-05 RX ORDER — METHYLPREDNISOLONE SODIUM SUCCINATE 125 MG/2ML
125 INJECTION, POWDER, LYOPHILIZED, FOR SOLUTION INTRAMUSCULAR; INTRAVENOUS
Start: 2024-09-02

## 2024-08-05 RX ORDER — ALBUTEROL SULFATE 0.83 MG/ML
2.5 SOLUTION RESPIRATORY (INHALATION)
OUTPATIENT
Start: 2024-09-02

## 2024-08-05 RX ORDER — PENTAMIDINE ISETHIONATE 300 MG/300MG
300 INHALANT RESPIRATORY (INHALATION)
Status: CANCELLED
Start: 2024-09-02

## 2024-08-05 RX ORDER — DIPHENHYDRAMINE HYDROCHLORIDE 50 MG/ML
50 INJECTION INTRAMUSCULAR; INTRAVENOUS
Start: 2024-09-02

## 2024-08-05 RX ORDER — ALBUTEROL SULFATE 0.83 MG/ML
2.5 SOLUTION RESPIRATORY (INHALATION) ONCE
Status: COMPLETED | OUTPATIENT
Start: 2024-08-05 | End: 2024-08-05

## 2024-08-05 RX ORDER — PENTAMIDINE ISETHIONATE 300 MG/300MG
300 INHALANT RESPIRATORY (INHALATION)
Status: DISCONTINUED | OUTPATIENT
Start: 2024-08-05 | End: 2024-08-05 | Stop reason: HOSPADM

## 2024-08-05 RX ADMIN — ALBUTEROL SULFATE 2.5 MG: 0.83 SOLUTION RESPIRATORY (INHALATION) at 10:36

## 2024-08-05 RX ADMIN — PENTAMIDINE ISETHIONATE 300 MG: 300 INHALANT RESPIRATORY (INHALATION) at 10:36

## 2024-08-05 NOTE — PROGRESS NOTES
Patient was seen today for a Pentamidine nebulizer tx ordered by MAREN Morel.    Patient was first given 2.5 mg of albuterol nebulizer, after which Pentamidine 300 mg (Lot # [id number]) mixed with 6cc Sterile H20 was administered through a filtered nebulizer.    Pre-treatment: SpO2 = 100%   HR = 91   BBS = clear   Post-treatment: SpO2 = 100%  HR = 108  BBS = clear    No adverse side effects noted by the patient.    This service today was provided under the direct supervision of Dr. Arora, who was available if needed.     Procedure was completed by Stacy Alberto.

## 2024-08-08 ENCOUNTER — LAB REQUISITION (OUTPATIENT)
Dept: LAB | Facility: CLINIC | Age: 59
End: 2024-08-08
Payer: MEDICARE

## 2024-08-08 DIAGNOSIS — Z79.899 OTHER LONG TERM (CURRENT) DRUG THERAPY: ICD-10-CM

## 2024-08-08 DIAGNOSIS — Z20.828 CONTACT WITH AND (SUSPECTED) EXPOSURE TO OTHER VIRAL COMMUNICABLE DISEASES: ICD-10-CM

## 2024-08-08 DIAGNOSIS — Z48.288 ENCOUNTER FOR AFTERCARE FOLLOWING MULTIPLE ORGAN TRANSPLANT: ICD-10-CM

## 2024-08-08 DIAGNOSIS — Z94.4 LIVER TRANSPLANT STATUS (H): ICD-10-CM

## 2024-08-08 LAB
ALBUMIN SERPL BCG-MCNC: 3.7 G/DL (ref 3.5–5.2)
ALP SERPL-CCNC: 169 U/L (ref 40–150)
ALT SERPL W P-5'-P-CCNC: 21 U/L (ref 0–50)
ANION GAP SERPL CALCULATED.3IONS-SCNC: 14 MMOL/L (ref 7–15)
AST SERPL W P-5'-P-CCNC: 24 U/L (ref 0–45)
BILIRUB DIRECT SERPL-MCNC: <0.2 MG/DL (ref 0–0.3)
BILIRUB SERPL-MCNC: 0.6 MG/DL
BUN SERPL-MCNC: 22.3 MG/DL (ref 8–23)
CALCIUM SERPL-MCNC: 9.1 MG/DL (ref 8.8–10.4)
CHLORIDE SERPL-SCNC: 100 MMOL/L (ref 98–107)
CREAT SERPL-MCNC: 0.69 MG/DL (ref 0.51–0.95)
CYCLOSPORINE BLD LC/MS/MS-MCNC: 197 UG/L (ref 50–400)
EGFRCR SERPLBLD CKD-EPI 2021: >90 ML/MIN/1.73M2
ERYTHROCYTE [DISTWIDTH] IN BLOOD BY AUTOMATED COUNT: 15.8 % (ref 10–15)
GLUCOSE SERPL-MCNC: 96 MG/DL (ref 70–99)
HCO3 SERPL-SCNC: 22 MMOL/L (ref 22–29)
HCT VFR BLD AUTO: 29.4 % (ref 35–47)
HGB BLD-MCNC: 9.3 G/DL (ref 11.7–15.7)
MAGNESIUM SERPL-MCNC: 1.8 MG/DL (ref 1.7–2.3)
MCH RBC QN AUTO: 29.2 PG (ref 26.5–33)
MCHC RBC AUTO-ENTMCNC: 31.6 G/DL (ref 31.5–36.5)
MCV RBC AUTO: 93 FL (ref 78–100)
PHOSPHATE SERPL-MCNC: 4.7 MG/DL (ref 2.5–4.5)
PLATELET # BLD AUTO: 304 10E3/UL (ref 150–450)
POTASSIUM SERPL-SCNC: 4.3 MMOL/L (ref 3.4–5.3)
PROT SERPL-MCNC: 7.8 G/DL (ref 6.4–8.3)
RBC # BLD AUTO: 3.18 10E6/UL (ref 3.8–5.2)
SODIUM SERPL-SCNC: 136 MMOL/L (ref 135–145)
TME LAST DOSE: NORMAL H
TME LAST DOSE: NORMAL H
WBC # BLD AUTO: 5 10E3/UL (ref 4–11)

## 2024-08-08 PROCEDURE — 84100 ASSAY OF PHOSPHORUS: CPT | Mod: ORL | Performed by: NURSE PRACTITIONER

## 2024-08-08 PROCEDURE — 82248 BILIRUBIN DIRECT: CPT | Mod: ORL | Performed by: NURSE PRACTITIONER

## 2024-08-08 PROCEDURE — 83735 ASSAY OF MAGNESIUM: CPT | Mod: ORL | Performed by: NURSE PRACTITIONER

## 2024-08-08 PROCEDURE — 80053 COMPREHEN METABOLIC PANEL: CPT | Mod: ORL | Performed by: NURSE PRACTITIONER

## 2024-08-08 PROCEDURE — 85027 COMPLETE CBC AUTOMATED: CPT | Mod: ORL | Performed by: NURSE PRACTITIONER

## 2024-08-08 PROCEDURE — 80158 DRUG ASSAY CYCLOSPORINE: CPT | Mod: ORL | Performed by: NURSE PRACTITIONER

## 2024-08-09 NOTE — TELEPHONE ENCOUNTER
RECORDS RECEIVED FROM: Internal    REASON FOR VISIT: Seizure   PROVIDER: Soo Lugo MD   DATE OF APPT: 8/26/24 @ 10:00 am    NOTES (FOR ALL VISITS) STATUS DETAILS   OFFICE NOTE from referring provider Internal Hosp Referral    DISCHARGE SUMMARY from hospital Internal 7/12/24-7/16/24 Pravin Ball MD @[facility]    7/9/24-7/11/24 Haim Geronimo MD @[facility]     EEG Internal 7/15/24 EEG Video 2-12 Hrs Unmonitored  7/14/24 EEG Video 2-12 Hrs Unmonitored  7/13/24 EEG Video 2-12 Hrs Unmonitored  7/12/24 EEG Video 2-12 Hrs Unmonitored  7/10/24 EEG Video 2-12 Hrs Unmonitored     MEDICATION LIST Internal    IMAGING  (FOR ALL VISITS)     LUMBAR PUNCTURE Internal [facility]  7/9/24 Lumbar Puncture     MRI (HEAD, NECK, SPINE) Internal [facility]  7/10/24 MR Brain     CT (HEAD, NECK, SPINE) Internal [facility]  7/12/24 CT Head  7/11/24 CT Head  7/9/24 CT Head

## 2024-08-13 ENCOUNTER — OFFICE VISIT (OUTPATIENT)
Dept: TRANSPLANT | Facility: CLINIC | Age: 59
End: 2024-08-13
Attending: TRANSPLANT SURGERY
Payer: MEDICARE

## 2024-08-13 ENCOUNTER — LAB (OUTPATIENT)
Dept: LAB | Facility: CLINIC | Age: 59
End: 2024-08-13
Attending: TRANSPLANT SURGERY
Payer: MEDICARE

## 2024-08-13 VITALS
SYSTOLIC BLOOD PRESSURE: 128 MMHG | TEMPERATURE: 98.3 F | DIASTOLIC BLOOD PRESSURE: 82 MMHG | OXYGEN SATURATION: 97 % | HEART RATE: 80 BPM

## 2024-08-13 DIAGNOSIS — Z94.4 S/P LIVER TRANSPLANT (H): ICD-10-CM

## 2024-08-13 DIAGNOSIS — Z94.4 LIVER REPLACED BY TRANSPLANT (H): ICD-10-CM

## 2024-08-13 LAB
ALBUMIN SERPL BCG-MCNC: 3.9 G/DL (ref 3.5–5.2)
ALP SERPL-CCNC: 175 U/L (ref 40–150)
ALT SERPL W P-5'-P-CCNC: 21 U/L (ref 0–50)
ANION GAP SERPL CALCULATED.3IONS-SCNC: 9 MMOL/L (ref 7–15)
AST SERPL W P-5'-P-CCNC: 20 U/L (ref 0–45)
BILIRUB DIRECT SERPL-MCNC: 0.22 MG/DL (ref 0–0.3)
BILIRUB SERPL-MCNC: 0.6 MG/DL
BUN SERPL-MCNC: 29 MG/DL (ref 8–23)
CALCIUM SERPL-MCNC: 9.8 MG/DL (ref 8.8–10.4)
CHLORIDE SERPL-SCNC: 102 MMOL/L (ref 98–107)
CREAT SERPL-MCNC: 0.82 MG/DL (ref 0.51–0.95)
EGFRCR SERPLBLD CKD-EPI 2021: 81 ML/MIN/1.73M2
ERYTHROCYTE [DISTWIDTH] IN BLOOD BY AUTOMATED COUNT: 15.7 % (ref 10–15)
GLUCOSE SERPL-MCNC: 98 MG/DL (ref 70–99)
HCO3 SERPL-SCNC: 25 MMOL/L (ref 22–29)
HCT VFR BLD AUTO: 31.6 % (ref 35–47)
HGB BLD-MCNC: 10.1 G/DL (ref 11.7–15.7)
MAGNESIUM SERPL-MCNC: 1.8 MG/DL (ref 1.7–2.3)
MCH RBC QN AUTO: 29.3 PG (ref 26.5–33)
MCHC RBC AUTO-ENTMCNC: 32 G/DL (ref 31.5–36.5)
MCV RBC AUTO: 92 FL (ref 78–100)
PHOSPHATE SERPL-MCNC: 4.2 MG/DL (ref 2.5–4.5)
PLATELET # BLD AUTO: 279 10E3/UL (ref 150–450)
POTASSIUM SERPL-SCNC: 5.2 MMOL/L (ref 3.4–5.3)
PROT SERPL-MCNC: 8 G/DL (ref 6.4–8.3)
RBC # BLD AUTO: 3.45 10E6/UL (ref 3.8–5.2)
SODIUM SERPL-SCNC: 136 MMOL/L (ref 135–145)
WBC # BLD AUTO: 5.1 10E3/UL (ref 4–11)

## 2024-08-13 PROCEDURE — 83735 ASSAY OF MAGNESIUM: CPT | Performed by: PATHOLOGY

## 2024-08-13 PROCEDURE — 80158 DRUG ASSAY CYCLOSPORINE: CPT | Performed by: TRANSPLANT SURGERY

## 2024-08-13 PROCEDURE — 80053 COMPREHEN METABOLIC PANEL: CPT | Performed by: PATHOLOGY

## 2024-08-13 PROCEDURE — 84100 ASSAY OF PHOSPHORUS: CPT | Performed by: PATHOLOGY

## 2024-08-13 PROCEDURE — 82248 BILIRUBIN DIRECT: CPT | Performed by: PATHOLOGY

## 2024-08-13 PROCEDURE — G0463 HOSPITAL OUTPT CLINIC VISIT: HCPCS | Performed by: TRANSPLANT SURGERY

## 2024-08-13 PROCEDURE — 99000 SPECIMEN HANDLING OFFICE-LAB: CPT | Performed by: PATHOLOGY

## 2024-08-13 PROCEDURE — 36415 COLL VENOUS BLD VENIPUNCTURE: CPT | Performed by: PATHOLOGY

## 2024-08-13 PROCEDURE — 85027 COMPLETE CBC AUTOMATED: CPT | Performed by: PATHOLOGY

## 2024-08-13 PROCEDURE — 99214 OFFICE O/P EST MOD 30 MIN: CPT | Mod: 24 | Performed by: TRANSPLANT SURGERY

## 2024-08-13 NOTE — PROGRESS NOTES
Transplant Surgery -OUTPATIENT IMMUNOSUPPRESSION PROGRESS NOTE    Date of Visit: 08/13/2024    Transplants:  6/22/2024 (Liver); Postoperative day:  52  ASSESMENT AND PLAN:  1.Graft Function:Liver allograft: no rejection or technical problems.    2.Immunosuppression Management:keep cya levels at 200 ng/dL  3.Hypertension:good  4.Renal Function: good  5.Lab frequency:weekly  6.Other:  Wound healthy     Date: August 13, 2024    Transplant:  [x]                             Liver [x]                              Kidney []                             Pancreas []                              Other:             Chief Complaint:  Doing well    History of Present Illness:  Patient Active Problem List   Diagnosis    Non morbid obesity due to excess calories    Other isolated or specific phobias    Other congenital malformations of mouth    Contact dermatitis and other eczema, due to unspecified cause    Intestinal infection due to Clostridium difficile    Iron deficiency anemia, unspecified iron deficiency anemia type    Rosacea    Atypical ductal hyperplasia of left breast    Idiopathic thrombocytopenia (H)    Postmenopausal- s/p hysterectomy with BSO - not on HRT secondary to atypical ductal hyperplasia & breast pain -no paps needed    Claustrophobia    S/P total hysterectomy and bilateral salpingo-oophorectomy    Abnormal liver enzymes- ? related to ongoing etoh use/abuse    Essential hypertension with goal blood pressure less than 140/90    Gastroenteritis    Stool incontinence    CARDIOVASCULAR SCREENING; LDL GOAL LESS THAN 130    AA (alcohol abuse) - ? ongoing     Alcoholic fatty liver    Acquired hyperbilirubinemia- ? secondary to alcohol use    Diffuse nodular cirrhosis of liver (H)    Severe Perianal Crohn's Disease    Biliary colic    Cholecystitis    Alcoholic cirrhosis of liver with ascites (H) - seeing [facility]     Abscess of anal or rectal region    Anal fistula    HCC (hepatocellular carcinoma) (H)    Alcohol use  disorder, moderate, dependence (H)    Crohn's disease of large intestine with fistula (H)    Ascending aorta dilation (H24)    Hyponatremia    Liver cirrhosis (H)    S/P liver transplant (H)    Immunosuppressed status (H24)    Acute post-operative pain    Insomnia    Anemia due to blood loss, acute    Moderate malnutrition (H24)    RADHA (acute kidney injury) (H24)    Hypomagnesemia    Liver replaced by transplant (H)    Fever, unspecified fever cause    Pleural effusion on right    Pneumonia of right lung due to infectious organism, unspecified part of lung    Xerosis cutis    Symmetrical drug-related intertriginous and flexural exanthema    Hypervolemia    Hypocalcemia    Seizure (H)    Seizures (H)     SOCIAL /FAMILY HISTORY: [x]                  No recent change    Past Medical History:   Diagnosis Date    Alcohol abuse     Alcoholic cirrhosis of liver with ascites     Anal fistula     Atypical ductal hyperplasia of breast 09/10/2010    ERT not recommended -left - and flat epithelial atypia-scheduled for breast biopsy 9/17/2010     Bifid uvula     Cholelithiasis     Contact perianal dermatitis and other eczema     recurrent - clobetasol     Crohn disease     Fear of flying      - gets Ativan prn.     HCC (hepatocellular carcinoma) (H) 02/01/2023    Hypertension     IBS (irritable bowel syndrome)     Seizure disorder (H) 07/09/2024    Status post liver transplantation (H) 06/22/2024    Symmetrical drug-related intertriginous and flexural exanthema 06/30/2024    Secondary to dapsone     Past Surgical History:   Procedure Laterality Date    BENCH LIVER  6/22/2024    Procedure: Bench liver;  Surgeon: Pravin Ball MD;  Location: [facility] OR    BIOPSY ANAL N/A 3/28/2019    anal biopsy and culure placement of seton - Dr Fleming    BIOPSY BREAST Left 09/17/2010    - scheduled with Dr. Varma     BIOPSY LIVER  2019    COLONOSCOPY  2006    COLONOSCOPY N/A 12/23/2014    Procedure: COMBINED COLONOSCOPY, SINGLE OR  MULTIPLE BIOPSY/POLYPECTOMY BY BIOPSY;  Surgeon: Diane Fleming MD;  Location:  GI    COLONOSCOPY N/A 12/5/2019    Procedure: COLONOSCOPY, WITH POLYPECTOMY AND BIOPSY;  Surgeon: Farhan Schilling MD;  Location: [facility]    COLONOSCOPY N/A 2/27/2024    Procedure: COLONOSCOPY, WITH POLYPECTOMY AND BIOPSY;  Surgeon: Michelle Diaz MD;  Location: [facility] OR    ENDOSCOPIC RETROGRADE CHOLANGIOPANCREATOGRAM N/A 4/24/2020    Procedure: ENDOSCOPIC RETROGRADE CHOLANGIOPANCREATOGRAPHY WITH, sledge removal,sphincterotomy, stent in gallbladder and pancreatic duct stent, and balloon dilation;  Surgeon: Guru Isac Kraft MD;  Location: [facility]    ESOPHAGOSCOPY, GASTROSCOPY, DUODENOSCOPY (EGD), COMBINED N/A 12/5/2019    Procedure: ESOPHAGOGASTRODUODENOSCOPY (EGD);  Surgeon: Farhan Schilling MD;  Location: [facility]    ESOPHAGOSCOPY, GASTROSCOPY, DUODENOSCOPY (EGD), COMBINED N/A 5/11/2023    Procedure: Esophagoscopy, gastroscopy, duodenoscopy (EGD), combined;  Surgeon: Jeannette Cervantes MD;  Location: [facility]    EXAM UNDER ANESTHESIA ANUS N/A 3/28/2019    Procedure: EXAM UNDER ANESTHESIA ANUS;  Surgeon: Diane Fleming MD;  Location:  OR    EXAM UNDER ANESTHESIA ANUS N/A 2/26/2021    Procedure: EXAM UNDER ANESTHESIA OF ANUS, SETON PLACEMENT, EXCISION OF SKIN BRIDGE;  Surgeon: Avtar Nam MD;  [place] [facility] [place]    EXAM UNDER ANESTHESIA ANUS N/A 1/6/2023    Procedure: EXAM UNDER ANESTHESIA, ANUS, SETON EXCHANGE, partial fistulotomy;  Surgeon: Mary Dugan MD;  [place] [facility] [place]    HYSTERECTOMY, VAGINAL  2006    with Dr. Licha Zhou - with BSO for fibroids     IR GALLBLADDER DRAIN PLACEMENT  4/22/2020    IR SIRT (SELECTIVE INTERNAL RADIO THERAPY)  2/21/2023    IR VISCERAL ANGIOGRAM  2/21/2023    IR VISCERAL EMBOLIZATION  2/27/2023    LAPAROSCOPIC ABLATION LIVER TUMOR N/A 8/29/2023    Procedure: Diagnostic Laparoscopy, Laparoscopic microwave ablation of liver tumor  intraoperative ultrasound of liver, lysis of adhesions 1 hour,;  Surgeon: Albino Saavedra MD;  Location: [facility]    OPEN REDUCTION INTERNAL FIXATION ANKLE Left at age 27    plates and screws removed at age 36    Pelviscopy with removal of bilateral hydrosalpinges.  04/15/2010    TRANSPLANT LIVER RECIPIENT  DONOR N/A 2024    Procedure: Transplant liver recipient  donor, with biliary stent placement;  Surgeon: Pravin Ball MD;  Location: [facility] OR    ZZC APPENDECTOMY  at age 14     Social History     Socioeconomic History    Marital status:      Spouse name: Albino    Number of children: 3    Years of education: 14    Highest education level: Not on file   Occupational History    Occupation: unemployed   Tobacco Use    Smoking status: Never     Passive exposure: Never    Smokeless tobacco: Never   Vaping Use    Vaping status: Never Used   Substance and Sexual Activity    Alcohol use: Not Currently    Drug use: No    Sexual activity: Yes     Partners: Male     Birth control/protection: Post-menopausal     Comment: husb had vasectomy   Other Topics Concern     Service No    Blood Transfusions No    Caffeine Concern No     Comment: rarely drinks caffeine    Occupational Exposure Not Asked    Hobby Hazards Not Asked    Sleep Concern Not Asked    Stress Concern Not Asked    Weight Concern Not Asked    Special Diet Not Asked    Back Care Not Asked    Exercise Yes     Comment: does a lot of walking - 4x/week    Bike Helmet Not Asked    Seat Belt Yes     Comment: always    Self-Exams Yes     Comment: SBE encouraged monthly    Parent/sibling w/ CABG, MI or angioplasty before 65F 55M? Yes   Social History Narrative    calcium - drinks 5-6 large glasses skim milk/day    flex sig/colonoscopy -at age 49    sun precautions - discussed    mammogram - needs every 2 years in her 30's, then yearly from then on    Td booster -  and 2010    pneumovax -at age 59    DEXA -when perimenopausal     stool hemoccults - every year after age 40    ASA- start at age 39    mulvitamin - encouraged     Social Determinants of Health     Financial Resource Strain: Low Risk  (2/4/2024)    Financial Resource Strain     Within the past 12 months, have you or your family members you live with been unable to get utilities (heat, electricity) when it was really needed?: No   Food Insecurity: Low Risk  (2/4/2024)    Food Insecurity     Within the past 12 months, did you worry that your food would run out before you got money to buy more?: No     Within the past 12 months, did the food you bought just not last and you didn t have money to get more?: No   Transportation Needs: Low Risk  (2/4/2024)    Transportation Needs     Within the past 12 months, has lack of transportation kept you from medical appointments, getting your medicines, non-medical meetings or appointments, work, or from getting things that you need?: No   Physical Activity: Unknown (9/15/2020)    Exercise Vital Sign     Days of Exercise per Week: 0 days     Minutes of Exercise per Session: Not on file   Stress: Stress Concern Present (9/15/2020)    [employer] [place] [employer] - Occupational Stress Questionnaire     Feeling of Stress : Rather much   Social Connections: Unknown (9/15/2020)    Social Connection and Isolation Panel [NHANES]     Frequency of Communication with Friends and Family: More than three times a week     Frequency of Social Gatherings with Friends and Family: More than three times a week     Attends Rastafari Services: Not on file     Active Member of Clubs or Organizations: Not on file     Attends Club or Organization Meetings: Not on file     Marital Status: Not on file   Interpersonal Safety: Low Risk  (2/7/2024)    Interpersonal Safety     Do you feel physically and emotionally safe where you currently live?: Yes     Within the past 12 months, have you been hit, slapped, kicked or otherwise physically hurt by someone?: No      Within the past 12 months, have you been humiliated or emotionally abused in other ways by your partner or ex-partner?: No   Housing Stability: Low Risk  (2/4/2024)    Housing Stability     Do you have housing? : Yes     Are you worried about losing your housing?: No     Prescription Medications as of 8/13/2024         Rx Number Disp Refills Start End Last Dispensed Date Next Fill Date Owning Pharmacy    aspirin (ASA) 81 MG chewable tablet  30 tablet 11 7/17/2024 --   [facility] [place] [id number] [address]    Sig: Take 1 tablet (81 mg) by mouth or Feeding Tube daily    Class: E-Prescribe    Route: Oral or Feeding Tube    cycloSPORINE modified (GENERIC EQUIVALENT) 100 MG capsule  120 capsule 11 7/25/2024 --   [facility] [place] [id number] [address]    Sig: Take 2 capsules (200 mg) by mouth 2 times daily Total dose 200 mg twice daily    Class: E-Prescribe    Route: Oral    diphenhydrAMINE (BENADRYL) 50 MG capsule  -- --  --       Sig: Take 50 mg by mouth nightly as needed for sleep    Class: Historical    Route: Oral    levETIRAcetam (KEPPRA) 1000 MG tablet  90 tablet 1 7/19/2024 --   [facility] [place] [id number] [address]    Sig: Take 1 tablet (1,000 mg) by mouth 3 times daily    Class: E-Prescribe    Route: Oral    magnesium oxide (MAG-OX) 400 MG tablet  120 tablet 11 7/17/2024 --   [facility] [place] [id number] [address]    Sig: Take 2 tablets (800 mg) by mouth 2 times daily    Class: E-Prescribe    Route: Oral    mycophenolate (GENERIC EQUIVALENT) 250 MG capsule  180 capsule 1 7/30/2024 --   [facility] [place] [id number] [address]    Sig: Take 3 capsules (750 mg) by mouth 2 times daily    Class: E-Prescribe    Notes to Pharmacy: TXP DT 6/22/2024 (Liver) TXP Dischg DT 6/28/2024 DX Liver replaced by transplant Z94.4 [facility]  [facility] ([place], [place])    Route: Oral    pantoprazole (PROTONIX) 40 MG EC tablet  30 tablet 11 7/17/2024 --   [facility] [place] [id number] [address]    Sig: Take 1 tablet (40 mg) by mouth daily    Class: E-Prescribe    Route: Oral    predniSONE (DELTASONE) 5 MG tablet  30 tablet 2 7/17/2024 --   [facility] [place] [id number] [address]    Sig: Take 1 tablet (5 mg) by mouth daily    Class: E-Prescribe    Notes to Pharmacy: TXP DT 6/22/2024 (Liver) TXP Dischg DT 6/28/2024 DX Liver replaced by transplant Z94.4 TX Center [facility] ([place], [place])    Route: Oral    psyllium (METAMUCIL) WAFR  60 Wafer 2 7/7/2024 --   [facility] [place], [place] - [address]    Sig: Take 2 Wafers by mouth daily    Class: E-Prescribe    Route: Oral    traZODone (DESYREL) 50 MG tablet  30 tablet 2 7/30/2024 --   [facility] [place] [address] [address]    Sig: Take 0.5-1 tablets (25-50 mg) by mouth nightly as needed for sleep    Class: E-Prescribe    Route: Oral    ursodiol (ACTIGALL) 300 MG capsule  90 capsule 11 7/17/2024 --   [facility] [place] [address] [address]    Sig: Take 1 capsule (300 mg) by mouth or Feeding Tube 3 times daily    Class: E-Prescribe    Route: Oral or Feeding Tube    valGANciclovir (VALCYTE) 450 MG tablet  90 tablet 1 7/19/2024 --   [facility] [place] [address]    Sig: Take 1 tablet (450 mg) by mouth daily    Class: E-Prescribe    Route: Oral    acetaminophen (TYLENOL) 325 MG tablet  60 tablet 0 6/28/2024 --   [facility] [place], [place] - [address]    Sig: Take 2 tablets (650 mg) by mouth every 6 hours as needed for mild pain    Class: E-Prescribe    Route: Oral    albuterol (PROVENTIL) (2.5 MG/3ML) 0.083% neb solution  90 mL 5 8/2/2024 --    [facility] [place], [place] - [address] [place] [address]    Sig: Take 1 vial (2.5 mg) by nebulization every 28 days    Class: E-Prescribe    Route: Nebulization    cycloSPORINE modified (GENERIC EQUIVALENT) 25 MG capsule  180 capsule 0 7/25/2024 --   [facility] [place] [place] [facility] [address] [place] [address] [facility]    Sig: Take 1 capsule (25 mg) by mouth as needed (dose change)    Class: E-Prescribe    Route: Oral    emollient (VANICREAM) external cream  453 g 1 7/6/2024 --       Sig: Apply topically every 6 hours as needed for other (Dryness)    Class: No Print Out    Route: Topical    pentamidine (NEBUPENT) 300 MG neb solution  -- -- 8/2/2024 --       Sig: Inhale 300 mg into the lungs every 28 days    Class: No Print Out    Route: Inhalation    polyethylene glycol (MIRALAX) 17 GM/Dose powder  510 g 0 6/28/2024 --   [facility] - [address]    Sig: Take 17 g by mouth 2 times daily as needed for constipation    Class: E-Prescribe    Route: Oral          Clinic-Administered Medications as of 8/13/2024         Dose Frequency Start End    hyoscyamine (LEVSIN/SL) sublingual tablet 125 mcg 125 mcg EVERY 4 HOURS PRN 6/16/2022 --    Admin Instructions: Patients using antacids should take hyoscyamine before meals and the antacid after meals.    Route: Sublingual          Blood transfusion related (informational only), Dapsone, Fish oil, Metronidazole, Ppd [tuberculin purified protein derivative], Sulfa antibiotics, and Terbinafine and related   REVIEW OF SYSTEMS (check box if normal)  [x]               GENERAL  [x]                 PULMONARY [x]                GENITOURINARY  [x]                CNS                 [x]                 CARDIAC  [x]                 ENDOCRINE  [x]                EARS,NOSE,THROAT [x]                 GASTROINTESTINAL [x]                 NEUROLOGIC    [x]                MUSCLOSKELTAL  [x]                  HEMATOLOGY      PHYSICAL EXAM  (check box if normal)/82   Pulse 80   Temp 98.3  F (36.8  C) (Oral)   LMP 05/31/2006   SpO2 97%         [x]            GENERAL:    [x]       EYES:  ICTERIC   []        YES  []                    NO  [x]           EXTREMITIES: Clubbing []       Y     [x]           N    [x]           EARS, NOSE, THROAT: Membranes Moist    YES   [x]                   NO []                  [x]           LUNGS:  CLEAR    YES       [x]                  NO    []                                [x]           SKIN: Jaundice           YES       []                  NO    [x]                   Rash: YES       []                  NO    [x]                                     [x]             HEART: Regular Rate          YES       [x]                  NO    []                   Incision Clean:  YES       [x]                  NO    []                                [x]                    ABDOMEN: Organomegaly YES       []                  NO    [x]                       [x]                    NEUROLOGICAL:  Nonfocal  YES       [x]                  NO    []                       [x]                    Hernia YES       []                  NO    [x]                   PSYCHIATRIC:  Appropriate  YES       [x]                  NO    []                       OTHER:                                                                                                   PAIN SCALE:: 3 and 4

## 2024-08-13 NOTE — LETTER
8/13/2024      Abiola Matute  [address] [address]      Dear Colleague,    Thank you for referring your patient, Abiola Matute, to the [facility]ANSPL[facility]. Please see a copy of my visit note below.    Transplant Surgery -OUTPATIENT IMMUNOSUPPRESSION PROGRESS NOTE    Date of Visit: 08/13/2024    Transplants:  6/22/2024 (Liver); Postoperative day:  52  ASSESMENT AND PLAN:  1.Graft Function:Liver allograft: no rejection or technical problems.    2.Immunosuppression Management:keep cya levels at 200 ng/dL  3.Hypertension:good  4.Renal Function: good  5.Lab frequency:weekly  6.Other:  Wound healthy     Date: August 13, 2024    Transplant:  [x]                             Liver [x]                              Kidney []                             Pancreas []                              Other:             Chief Complaint:  Doing well    History of Present Illness:  Patient Active Problem List   Diagnosis     Non morbid obesity due to excess calories     Other isolated or specific phobias     Other congenital malformations of mouth     Contact dermatitis and other eczema, due to unspecified cause     Intestinal infection due to Clostridium difficile     Iron deficiency anemia, unspecified iron deficiency anemia type     Rosacea     Atypical ductal hyperplasia of left breast     Idiopathic thrombocytopenia (H)     Postmenopausal- s/p hysterectomy with BSO - not on HRT secondary to atypical ductal hyperplasia & breast pain -no paps needed     Claustrophobia     S/P total hysterectomy and bilateral salpingo-oophorectomy     Abnormal liver enzymes- ? related to ongoing etoh use/abuse     Essential hypertension with goal blood pressure less than 140/90     Gastroenteritis     Stool incontinence     CARDIOVASCULAR SCREENING; LDL GOAL LESS THAN 130     AA (alcohol abuse) - ? ongoing      Alcoholic fatty liver     Acquired hyperbilirubinemia- ? secondary to alcohol use     Diffuse  nodular cirrhosis of liver (H)     Severe Perianal Crohn's Disease     Biliary colic     Cholecystitis     Alcoholic cirrhosis of liver with ascites (H) - seeing [facility]      Abscess of anal or rectal region     Anal fistula     HCC (hepatocellular carcinoma) (H)     Alcohol use disorder, moderate, dependence (H)     Crohn's disease of large intestine with fistula (H)     Ascending aorta dilation (H24)     Hyponatremia     Liver cirrhosis (H)     S/P liver transplant (H)     Immunosuppressed status (H24)     Acute post-operative pain     Insomnia     Anemia due to blood loss, acute     Moderate malnutrition (H24)     RADHA (acute kidney injury) (H24)     Hypomagnesemia     Liver replaced by transplant (H)     Fever, unspecified fever cause     Pleural effusion on right     Pneumonia of right lung due to infectious organism, unspecified part of lung     Xerosis cutis     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      HCC (hepatocellular carcinoma) (H) 02/01/2023     Hypertension      IBS (irritable bowel syndrome)      Seizure disorder (H) 07/09/2024     Status post liver transplantation (H) 06/22/2024     Symmetrical drug-related intertriginous and flexural exanthema 06/30/2024    Secondary to dapsone     Past Surgical History:   Procedure Laterality Date     BENCH LIVER  6/22/2024    Procedure: Bench liver;  Surgeon:  Pravin Ball MD;  Location: [facility] OR     BIOPSY ANAL N/A 3/28/2019    anal biopsy and culure placement of seton - Dr Fleming     BIOPSY BREAST Left 09/17/2010    - scheduled with Dr. Varma      BIOPSY LIVER  2019     COLONOSCOPY  2006     COLONOSCOPY N/A 12/23/2014    Procedure: COMBINED COLONOSCOPY, SINGLE OR MULTIPLE BIOPSY/POLYPECTOMY BY BIOPSY;  Surgeon: Diane Fleming MD;  Location:  GI     COLONOSCOPY N/A 12/5/2019    Procedure: COLONOSCOPY, WITH POLYPECTOMY AND BIOPSY;  Surgeon: Farhan Schilling MD;  Location: [facility] GI     COLONOSCOPY N/A 2/27/2024    Procedure: COLONOSCOPY, WITH POLYPECTOMY AND BIOPSY;  Surgeon: Michelle Diaz MD;  Location: [facility] OR     ENDOSCOPIC RETROGRADE CHOLANGIOPANCREATOGRAM N/A 4/24/2020    Procedure: ENDOSCOPIC RETROGRADE CHOLANGIOPANCREATOGRAPHY WITH, sledge removal,sphincterotomy, stent in gallbladder and pancreatic duct stent, and balloon dilation;  Surgeon: Guru Isac Kraft MD;  Location: [facility] OR     ESOPHAGOSCOPY, GASTROSCOPY, DUODENOSCOPY (EGD), COMBINED N/A 12/5/2019    Procedure: ESOPHAGOGASTRODUODENOSCOPY (EGD);  Surgeon: Farhan Schilling MD;  Location: [facility] GI     ESOPHAGOSCOPY, GASTROSCOPY, DUODENOSCOPY (EGD), COMBINED N/A 5/11/2023    Procedure: Esophagoscopy, gastroscopy, duodenoscopy (EGD), combined;  Surgeon: Jeannette Cervantes MD;  Location: [facility] GI     EXAM UNDER ANESTHESIA ANUS N/A 3/28/2019    Procedure: EXAM UNDER ANESTHESIA ANUS;  Surgeon: Diane Fleming MD;  Location:  OR     EXAM UNDER ANESTHESIA ANUS N/A 2/26/2021    Procedure: EXAM UNDER ANESTHESIA OF ANUS, SETON PLACEMENT, EXCISION OF SKIN BRIDGE;  Surgeon: Avtar Nam MD;  Location: [facility] OR     EXAM UNDER ANESTHESIA ANUS N/A 1/6/2023    Procedure: EXAM UNDER ANESTHESIA, ANUS, SETON EXCHANGE, partial fistulotomy;  Surgeon: Mary Dugan MD;  Location: [facility] OR     HYSTERECTOMY, VAGINAL  2006    with Dr. Licha Zhou - with BSO for  fibroids      IR GALLBLADDER DRAIN PLACEMENT  2020     IR SIRT (SELECTIVE INTERNAL RADIO THERAPY)  2023     IR VISCERAL ANGIOGRAM  2023     IR VISCERAL EMBOLIZATION  2023     LAPAROSCOPIC ABLATION LIVER TUMOR N/A 2023    Procedure: Diagnostic Laparoscopy, Laparoscopic microwave ablation of liver tumor intraoperative ultrasound of liver, lysis of adhesions 1 hour,;  Surgeon: Albino Saavedra MD;  Location: [facility] OR     OPEN REDUCTION INTERNAL FIXATION ANKLE Left at age 27    plates and screws removed at age 36     Pelviscopy with removal of bilateral hydrosalpinges.  04/15/2010     TRANSPLANT LIVER RECIPIENT  DONOR N/A 2024    Procedure: Transplant liver recipient  donor, with biliary stent placement;  Surgeon: Pravin Ball MD;  Location: [facility] OR     ZZC APPENDECTOMY  at age 14     Social History     Socioeconomic History     Marital status:      Spouse name: Albino     Number of children: 3     Years of education: 14     Highest education level: Not on file   Occupational History     Occupation: unemployed   Tobacco Use     Smoking status: Never     Passive exposure: Never     Smokeless tobacco: Never   Vaping Use     Vaping status: Never Used   Substance and Sexual Activity     Alcohol use: Not Currently     Drug use: No     Sexual activity: Yes     Partners: Male     Birth control/protection: Post-menopausal     Comment: husb had vasectomy   Other Topics Concern      Service No     Blood Transfusions No     Caffeine Concern No     Comment: rarely drinks caffeine     Occupational Exposure Not Asked     Hobby Hazards Not Asked     Sleep Concern Not Asked     Stress Concern Not Asked     Weight Concern Not Asked     Special Diet Not Asked     Back Care Not Asked     Exercise Yes     Comment: does a lot of walking - 4x/week     Bike Helmet Not Asked     Seat Belt Yes     Comment: always     Self-Exams Yes     Comment: SBE encouraged monthly      Parent/sibling w/ CABG, MI or angioplasty before 65F 55M? Yes   Social History Narrative    calcium - drinks 5-6 large glasses skim milk/day    flex sig/colonoscopy -at age 49    sun precautions - discussed    mammogram - needs every 2 years in her 30's, then yearly from then on    Td booster - 9/99 and 4/27/2010    pneumovax -at age 59    DEXA -when perimenopausal    stool hemoccults - every year after age 40    ASA- start at age 39    mulvitamin - encouraged     Social Determinants of Health     Financial Resource Strain: Low Risk  (2/4/2024)    Financial Resource Strain      Within the past 12 months, have you or your family members you live with been unable to get utilities (heat, electricity) when it was really needed?: No   Food Insecurity: Low Risk  (2/4/2024)    Food Insecurity      Within the past 12 months, did you worry that your food would run out before you got money to buy more?: No      Within the past 12 months, did the food you bought just not last and you didn t have money to get more?: No   Transportation Needs: Low Risk  (2/4/2024)    Transportation Needs      Within the past 12 months, has lack of transportation kept you from medical appointments, getting your medicines, non-medical meetings or appointments, work, or from getting things that you need?: No   Physical Activity: Unknown (9/15/2020)    Exercise Vital Sign      Days of Exercise per Week: 0 days      Minutes of Exercise per Session: Not on file   Stress: Stress Concern Present (9/15/2020)    [employer] - Occupational Stress Questionnaire      Feeling of Stress : Rather much   Social Connections: Unknown (9/15/2020)    Social Connection and Isolation Panel [NHANES]      Frequency of Communication with Friends and Family: More than three times a week      Frequency of Social Gatherings with Friends and Family: More than three times a week      Attends Zoroastrian Services: Not on file      Active Member of  Clubs or Organizations: Not on file      Attends Club or Organization Meetings: Not on file      Marital Status: Not on file   Interpersonal Safety: Low Risk  (2/7/2024)    Interpersonal Safety      Do you feel physically and emotionally safe where you currently live?: Yes      Within the past 12 months, have you been hit, slapped, kicked or otherwise physically hurt by someone?: No      Within the past 12 months, have you been humiliated or emotionally abused in other ways by your partner or ex-partner?: No   Housing Stability: Low Risk  (2/4/2024)    Housing Stability      Do you have housing? : Yes      Are you worried about losing your housing?: No     Prescription Medications as of 8/13/2024         Rx Number Disp Refills Start End Last Dispensed Date Next Fill Date Owning Pharmacy    aspirin (ASA) 81 MG chewable tablet  30 tablet 11 7/17/2024 --   [facility] [address] [address]    Sig: Take 1 tablet (81 mg) by mouth or Feeding Tube daily    Class: E-Prescribe    Route: Oral or Feeding Tube    cycloSPORINE modified (GENERIC EQUIVALENT) 100 MG capsule  120 capsule 11 7/25/2024 --   [facility] [address] [address]    Sig: Take 2 capsules (200 mg) by mouth 2 times daily Total dose 200 mg twice daily    Class: E-Prescribe    Route: Oral    diphenhydrAMINE (BENADRYL) 50 MG capsule  -- --  --       Sig: Take 50 mg by mouth nightly as needed for sleep    Class: Historical    Route: Oral    levETIRAcetam (KEPPRA) 1000 MG tablet  90 tablet 1 7/19/2024 --   [facility] [address] [address]    Sig: Take 1 tablet (1,000 mg) by mouth 3 times daily    Class: E-Prescribe    Route: Oral    magnesium oxide (MAG-OX) 400 MG tablet  120 tablet 11 7/17/2024 --   [facility] [address] [address]    Sig: Take 2 tablets (800 mg) by mouth 2 times daily    Class: E-Prescribe    Route:  Oral    mycophenolate (GENERIC EQUIVALENT) 250 MG capsule  180 capsule 1 7/30/2024 --   [facility] [place] [address] [address]    Sig: Take 3 capsules (750 mg) by mouth 2 times daily    Class: E-Prescribe    Notes to Pharmacy: TXP DT 6/22/2024 (Liver) TXP Dischg DT 6/28/2024 DX Liver replaced by transplant Z94.4 TX [facility] ([place], [place])    Route: Oral    pantoprazole (PROTONIX) 40 MG EC tablet  30 tablet 11 7/17/2024 --   [facility] [place] [address] [address]    Sig: Take 1 tablet (40 mg) by mouth daily    Class: E-Prescribe    Route: Oral    predniSONE (DELTASONE) 5 MG tablet  30 tablet 2 7/17/2024 --   [facility] [place] [address] [address]    Sig: Take 1 tablet (5 mg) by mouth daily    Class: E-Prescribe    Notes to Pharmacy: TXP DT 6/22/2024 (Liver) TXP Dischg DT 6/28/2024 DX Liver replaced by transplant Z94.4 TX [facility] ([place], [place])    Route: Oral    psyllium (METAMUCIL) WAFR  60 Wafer 2 7/7/2024 --   [facility] [place], [place] - [address]    Sig: Take 2 Wafers by mouth daily    Class: E-Prescribe    Route: Oral    traZODone (DESYREL) 50 MG tablet  30 tablet 2 7/30/2024 --   [facility] [place] [address]    Sig: Take 0.5-1 tablets (25-50 mg) by mouth nightly as needed for sleep    Class: E-Prescribe    Route: Oral    ursodiol (ACTIGALL) 300 MG capsule  90 capsule 11 7/17/2024 --   [facility] [place] [address] [address]    Sig: Take 1 capsule (300 mg) by mouth or Feeding Tube 3 times daily    Class: E-Prescribe    Route: Oral or Feeding Tube    valGANciclovir (VALCYTE) 450 MG tablet  90 tablet 1 7/19/2024 --   [facility] [place] [place] - [address]    Sig: Take 1 tablet (450 mg)  by mouth daily    Class: E-Prescribe    Route: Oral    acetaminophen (TYLENOL) 325 MG tablet  60 tablet 0 6/28/2024 --   [address]    Sig: Take 2 tablets (650 mg) by mouth every 6 hours as needed for mild pain    Class: E-Prescribe    Route: Oral    albuterol (PROVENTIL) (2.5 MG/3ML) 0.083% neb solution  90 mL 5 8/2/2024 --   [address]    Sig: Take 1 vial (2.5 mg) by nebulization every 28 days    Class: E-Prescribe    Route: Nebulization    cycloSPORINE modified (GENERIC EQUIVALENT) 25 MG capsule  180 capsule 0 7/25/2024 --   [facility] [address]    Sig: Take 1 capsule (25 mg) by mouth as needed (dose change)    Class: E-Prescribe    Route: Oral    emollient (VANICREAM) external cream  453 g 1 7/6/2024 --       Sig: Apply topically every 6 hours as needed for other (Dryness)    Class: No Print Out    Route: Topical    pentamidine (NEBUPENT) 300 MG neb solution  -- -- 8/2/2024 --       Sig: Inhale 300 mg into the lungs every 28 days    Class: No Print Out    Route: Inhalation    polyethylene glycol (MIRALAX) 17 GM/Dose powder  510 g 0 6/28/2024 --   [address]    Sig: Take 17 g by mouth 2 times daily as needed for constipation    Class: E-Prescribe    Route: Oral          Clinic-Administered Medications as of 8/13/2024         Dose Frequency Start End    hyoscyamine (LEVSIN/SL) sublingual tablet 125 mcg 125 mcg EVERY 4 HOURS PRN 6/16/2022 --    Admin Instructions: Patients using antacids should take hyoscyamine before meals and the antacid after meals.    Route: Sublingual          Blood transfusion related (informational only), Dapsone, Fish oil, Metronidazole, Ppd [tuberculin purified protein derivative], Sulfa antibiotics, and Terbinafine and related   REVIEW OF SYSTEMS (check box if  normal)  [x]               GENERAL  [x]                 PULMONARY [x]                GENITOURINARY  [x]                CNS                 [x]                 CARDIAC  [x]                 ENDOCRINE  [x]                EARS,NOSE,THROAT [x]                 GASTROINTESTINAL [x]                 NEUROLOGIC    [x]                MUSCLOSKELTAL  [x]                  HEMATOLOGY      PHYSICAL EXAM (check box if normal)/82   Pulse 80   Temp 98.3  F (36.8  C) (Oral)   LMP 05/31/2006   SpO2 97%         [x]            GENERAL:    [x]       EYES:  ICTERIC   []        YES  []                    NO  [x]           EXTREMITIES: Clubbing []       Y     [x]           N    [x]           EARS, NOSE, THROAT: Membranes Moist    YES   [x]                   NO []                  [x]           LUNGS:  CLEAR    YES       [x]                  NO    []                                [x]           SKIN: Jaundice           YES       []                  NO    [x]                   Rash: YES       []                  NO    [x]                                     [x]             HEART: Regular Rate          YES       [x]                  NO    []                   Incision Clean:  YES       [x]                  NO    []                                [x]                    ABDOMEN: Organomegaly YES       []                  NO    [x]                       [x]                    NEUROLOGICAL:  Nonfocal  YES       [x]                  NO    []                       [x]                    Hernia YES       []                  NO    [x]                   PSYCHIATRIC:  Appropriate  YES       [x]                  NO    []                       OTHER:                                                                                                   PAIN SCALE:: 3 and 4       Again, thank you for allowing me to participate in the care of your patient.        Sincerely,        Pravin Ball MD

## 2024-08-14 ENCOUNTER — TELEPHONE (OUTPATIENT)
Dept: TRANSPLANT | Facility: CLINIC | Age: 59
End: 2024-08-14
Payer: MEDICARE

## 2024-08-14 ENCOUNTER — MYC MEDICAL ADVICE (OUTPATIENT)
Dept: GASTROENTEROLOGY | Facility: CLINIC | Age: 59
End: 2024-08-14
Payer: MEDICARE

## 2024-08-14 DIAGNOSIS — Z94.4 S/P LIVER TRANSPLANT (H): Primary | ICD-10-CM

## 2024-08-14 DIAGNOSIS — K50.119 CROHN'S DISEASE OF PERIANAL REGION WITH COMPLICATION (H): Primary | ICD-10-CM

## 2024-08-14 NOTE — TELEPHONE ENCOUNTER
Left a message stating that labs look ok and that the CSA level is pending and call if any other questions.

## 2024-08-14 NOTE — TELEPHONE ENCOUNTER
Patient Call: Voicemail  Date/Time: 8/14/2024 1153 am  Reason for call: questions regarding lab results

## 2024-08-15 ENCOUNTER — MEDICAL CORRESPONDENCE (OUTPATIENT)
Dept: HEALTH INFORMATION MANAGEMENT | Facility: CLINIC | Age: 59
End: 2024-08-15

## 2024-08-15 ENCOUNTER — LAB REQUISITION (OUTPATIENT)
Dept: LAB | Facility: CLINIC | Age: 59
End: 2024-08-15
Payer: MEDICARE

## 2024-08-15 ENCOUNTER — TELEPHONE (OUTPATIENT)
Dept: FAMILY MEDICINE | Facility: CLINIC | Age: 59
End: 2024-08-15

## 2024-08-15 DIAGNOSIS — Z20.828 CONTACT WITH AND (SUSPECTED) EXPOSURE TO OTHER VIRAL COMMUNICABLE DISEASES: ICD-10-CM

## 2024-08-15 DIAGNOSIS — Z94.4 HISTORY OF LIVER TRANSPLANT (H): ICD-10-CM

## 2024-08-15 DIAGNOSIS — Z94.4 LIVER TRANSPLANT STATUS (H): ICD-10-CM

## 2024-08-15 DIAGNOSIS — Z94.4 S/P LIVER TRANSPLANT (H): ICD-10-CM

## 2024-08-15 DIAGNOSIS — Z48.288 ENCOUNTER FOR AFTERCARE FOLLOWING MULTIPLE ORGAN TRANSPLANT: ICD-10-CM

## 2024-08-15 LAB
ALBUMIN SERPL BCG-MCNC: 3.9 G/DL (ref 3.5–5.2)
ALP SERPL-CCNC: 175 U/L (ref 40–150)
ALT SERPL W P-5'-P-CCNC: 21 U/L (ref 0–50)
ANION GAP SERPL CALCULATED.3IONS-SCNC: 13 MMOL/L (ref 7–15)
AST SERPL W P-5'-P-CCNC: 22 U/L (ref 0–45)
BASOPHILS # BLD AUTO: 0.1 10E3/UL (ref 0–0.2)
BASOPHILS NFR BLD AUTO: 1 %
BILIRUB DIRECT SERPL-MCNC: <0.2 MG/DL (ref 0–0.3)
BILIRUB SERPL-MCNC: 0.6 MG/DL
BUN SERPL-MCNC: 23.1 MG/DL (ref 8–23)
CALCIUM SERPL-MCNC: 9.2 MG/DL (ref 8.8–10.4)
CHLORIDE SERPL-SCNC: 101 MMOL/L (ref 98–107)
CREAT SERPL-MCNC: 0.72 MG/DL (ref 0.51–0.95)
CYCLOSPORINE BLD LC/MS/MS-MCNC: 173 UG/L (ref 50–400)
CYCLOSPORINE BLD LC/MS/MS-MCNC: 218 UG/L (ref 50–400)
EGFRCR SERPLBLD CKD-EPI 2021: >90 ML/MIN/1.73M2
EOSINOPHIL # BLD AUTO: 0.3 10E3/UL (ref 0–0.7)
EOSINOPHIL NFR BLD AUTO: 7 %
ERYTHROCYTE [DISTWIDTH] IN BLOOD BY AUTOMATED COUNT: 15.7 % (ref 10–15)
GLUCOSE SERPL-MCNC: 97 MG/DL (ref 70–99)
HCO3 SERPL-SCNC: 23 MMOL/L (ref 22–29)
HCT VFR BLD AUTO: 31.2 % (ref 35–47)
HGB BLD-MCNC: 9.9 G/DL (ref 11.7–15.7)
HOLD SPECIMEN: NORMAL
IMM GRANULOCYTES # BLD: 0 10E3/UL
IMM GRANULOCYTES NFR BLD: 1 %
LYMPHOCYTES # BLD AUTO: 0.8 10E3/UL (ref 0.8–5.3)
LYMPHOCYTES NFR BLD AUTO: 21 %
MAGNESIUM SERPL-MCNC: 1.8 MG/DL (ref 1.7–2.3)
MCH RBC QN AUTO: 28.9 PG (ref 26.5–33)
MCHC RBC AUTO-ENTMCNC: 31.7 G/DL (ref 31.5–36.5)
MCV RBC AUTO: 91 FL (ref 78–100)
MONOCYTES # BLD AUTO: 0.3 10E3/UL (ref 0–1.3)
MONOCYTES NFR BLD AUTO: 8 %
NEUTROPHILS # BLD AUTO: 2.5 10E3/UL (ref 1.6–8.3)
NEUTROPHILS NFR BLD AUTO: 62 %
NRBC # BLD AUTO: 0 10E3/UL
NRBC BLD AUTO-RTO: 0 /100
PHOSPHATE SERPL-MCNC: 4.4 MG/DL (ref 2.5–4.5)
PLATELET # BLD AUTO: 282 10E3/UL (ref 150–450)
POTASSIUM SERPL-SCNC: 4.2 MMOL/L (ref 3.4–5.3)
PROT SERPL-MCNC: 7.6 G/DL (ref 6.4–8.3)
RBC # BLD AUTO: 3.42 10E6/UL (ref 3.8–5.2)
SODIUM SERPL-SCNC: 137 MMOL/L (ref 135–145)
TME LAST DOSE: NORMAL H
WBC # BLD AUTO: 4 10E3/UL (ref 4–11)

## 2024-08-15 PROCEDURE — 80053 COMPREHEN METABOLIC PANEL: CPT | Mod: ORL | Performed by: TRANSPLANT SURGERY

## 2024-08-15 PROCEDURE — 83735 ASSAY OF MAGNESIUM: CPT | Mod: ORL | Performed by: TRANSPLANT SURGERY

## 2024-08-15 PROCEDURE — 82248 BILIRUBIN DIRECT: CPT | Mod: ORL | Performed by: TRANSPLANT SURGERY

## 2024-08-15 PROCEDURE — 84100 ASSAY OF PHOSPHORUS: CPT | Mod: ORL | Performed by: TRANSPLANT SURGERY

## 2024-08-15 PROCEDURE — 85025 COMPLETE CBC W/AUTO DIFF WBC: CPT | Mod: ORL | Performed by: TRANSPLANT SURGERY

## 2024-08-15 PROCEDURE — 80158 DRUG ASSAY CYCLOSPORINE: CPT | Mod: ORL | Performed by: TRANSPLANT SURGERY

## 2024-08-15 RX ORDER — CYCLOSPORINE 25 MG/1
25 CAPSULE, LIQUID FILLED ORAL EVERY 12 HOURS
Qty: 180 CAPSULE | Refills: 3 | Status: SHIPPED | OUTPATIENT
Start: 2024-08-15 | End: 2024-08-23

## 2024-08-15 RX ORDER — CYCLOSPORINE 100 MG/1
200 CAPSULE, LIQUID FILLED ORAL 2 TIMES DAILY
Qty: 120 CAPSULE | Refills: 11 | Status: SHIPPED | OUTPATIENT
Start: 2024-08-15 | End: 2024-08-23

## 2024-08-15 NOTE — TELEPHONE ENCOUNTER
Faxed signed form from Dr Dupree for [facility]  Fax number [phone]  Sent to [facility]  And filed in the [facility].

## 2024-08-15 NOTE — TELEPHONE ENCOUNTER
Albino wanted to let us know that infusions will now start again with Dr. Real. They are also going to ask for another month of PT.

## 2024-08-15 NOTE — TELEPHONE ENCOUNTER
Therapy plan orders ; plan has been updated. Patient remains on the same medication regimen of Remicade 10mg/kg Q8w. Standing lab orders placed. Hepatitis B serologies from 2024 do not indicate immunitu. Quant Gold TB test due; order added to therapy plan4. Last office visit was 24 with Dr. Fuller. Next office visit is due to be scheduled. Patient receives infusions at [facility]. [facility] up to date; last visit with Sonia Pacheco RPH 2024.

## 2024-08-15 NOTE — TELEPHONE ENCOUNTER
Patient's form signed and dated as Linda is out of the office. Form placed in TC basket.    Chalino Dupree DO  8/15/2024 10:32 AM

## 2024-08-15 NOTE — TELEPHONE ENCOUNTER
ISSUE:   Cyclosporine level 173 on 8/13, goal 200, dose 200 mg BID.    PLAN:   Call Patient and confirm this was an accurate 12-hour trough.   Verify Cyclosporine dose ..   Confirm no new medications or or missed doses.   Confirm no new illness / infection / diarrhea.   If accurate trough and accurate dose, increase Cyclosporine dose to 225 mg BID     Is this more than a 50% increase or decrease in current IS dose: No      Repeat labs on Monday    OUTCOME:   Spoke with Albino, they confirm accurate trough level and current dose 200 mg BID.   Albino confirmed dose change to 225 mg BID.  Labino agreed to repeat labs on Monday .   Orders sent to preferred pharmacy for dose change and lab for repeat labs.   Albino voiced understanding of plan.     Michelle Starks LPN

## 2024-08-15 NOTE — TELEPHONE ENCOUNTER
Forms/Letter Request    Type of form/letter: Nursing Home/Assisted Living Orders  [facility] order [id number]  Do we have the form/letter: Yes: given to [facility]    Who is the form from? Home care    Where did/will the form come from? form was faxed in    When is form/letter needed by: when available    How would you like the form/letter returned: Fax : [phone]

## 2024-08-16 ENCOUNTER — TELEPHONE (OUTPATIENT)
Dept: TRANSPLANT | Facility: CLINIC | Age: 59
End: 2024-08-16
Payer: MEDICARE

## 2024-08-16 ENCOUNTER — TELEPHONE (OUTPATIENT)
Dept: GASTROENTEROLOGY | Facility: CLINIC | Age: 59
End: 2024-08-16
Payer: MEDICARE

## 2024-08-16 NOTE — TELEPHONE ENCOUNTER
[facility] Call Center    Phone Message    May a detailed message be left on voicemail: yes     Reason for Call: Other: Requesting a call back in regards to pt asking for copay assistance on IB medication. Requesting chart notes, labs and order for med.        Action Taken: Other: cs gi    Travel Screening: Not Applicable     Date of Service:

## 2024-08-16 NOTE — TELEPHONE ENCOUNTER
ISSUE:   Cyclosporine level 218 on 8.15.2024, goal 200, dose 225 mg BID.  Noted pt was contacted for dose change yesterday, her levels have been fluctuant.    PLAN/LPN TASK:   Call Patient and confirm this was an accurate 12-hour trough.   Verify Cyclosporine dose 225 mg BID.   Confirm no new medications or or missed doses.   Confirm no new illness / infection / diarrhea.   If accurate trough and accurate dose, stay on the same dose & repeat labs in 1 week.    Is this more than a 50% increase or decrease in current IS dose: No  If YES, justification: n/a    Repeat labs in 1 weeks.  *If > 50% change in immunosuppression dose, repeat labs in 1 week.

## 2024-08-19 DIAGNOSIS — Z79.2 NEED FOR ANTIBIOTIC PROPHYLAXIS FOR DENTAL PROCEDURE: ICD-10-CM

## 2024-08-19 DIAGNOSIS — Z94.4 HISTORY OF LIVER TRANSPLANT (H): ICD-10-CM

## 2024-08-19 DIAGNOSIS — Z94.4 LIVER REPLACED BY TRANSPLANT (H): Primary | ICD-10-CM

## 2024-08-19 RX ORDER — AMOXICILLIN 500 MG/1
2000 CAPSULE ORAL ONCE
Qty: 4 CAPSULE | Refills: 0 | Status: SHIPPED | OUTPATIENT
Start: 2024-08-19 | End: 2024-08-19

## 2024-08-19 NOTE — PROGRESS NOTES
Patient planning to have a crown and filling on August 29th   Currently on Valcyte and Pentamadine infection prophylaxis     Ordered Amoxicillin 2gm PO x1 60 minutes before procedure per protocol.     Message sent via [email or web address]

## 2024-08-19 NOTE — TELEPHONE ENCOUNTER
Health Call Center    Phone Message    May a detailed message be left on voicemail: yes     Reason for Call: Other: Pati at [facility] called re: infliximab home infusion.  They need a new RX and clinicals, to send a PA to patient's insurance.  Patient also needs help with copays.  Please follow up with Pati at [phone], ext. [phone].  Fax number is [phone].     Action Taken: Message routed to:  [facility]   [facility]    Travel Screening: Not Applicable                                                                    Attempts to contact [facility] RN for report, unsuccessful

## 2024-08-22 ENCOUNTER — LAB REQUISITION (OUTPATIENT)
Dept: LAB | Facility: CLINIC | Age: 59
End: 2024-08-22
Payer: MEDICARE

## 2024-08-22 ENCOUNTER — TELEPHONE (OUTPATIENT)
Dept: NEUROLOGY | Facility: CLINIC | Age: 59
End: 2024-08-22
Payer: MEDICARE

## 2024-08-22 DIAGNOSIS — Z79.899 OTHER LONG TERM (CURRENT) DRUG THERAPY: ICD-10-CM

## 2024-08-22 DIAGNOSIS — Z48.288 ENCOUNTER FOR AFTERCARE FOLLOWING MULTIPLE ORGAN TRANSPLANT: ICD-10-CM

## 2024-08-22 DIAGNOSIS — Z94.4 LIVER TRANSPLANT STATUS (H): ICD-10-CM

## 2024-08-22 LAB
ALBUMIN SERPL BCG-MCNC: 3.9 G/DL (ref 3.5–5.2)
ALP SERPL-CCNC: 155 U/L (ref 40–150)
ALT SERPL W P-5'-P-CCNC: 21 U/L (ref 0–50)
ANION GAP SERPL CALCULATED.3IONS-SCNC: 11 MMOL/L (ref 7–15)
AST SERPL W P-5'-P-CCNC: 23 U/L (ref 0–45)
BILIRUB DIRECT SERPL-MCNC: <0.2 MG/DL (ref 0–0.3)
BILIRUB SERPL-MCNC: 0.7 MG/DL
BUN SERPL-MCNC: 27 MG/DL (ref 8–23)
CALCIUM SERPL-MCNC: 9.1 MG/DL (ref 8.8–10.4)
CHLORIDE SERPL-SCNC: 101 MMOL/L (ref 98–107)
CREAT SERPL-MCNC: 0.82 MG/DL (ref 0.51–0.95)
CYCLOSPORINE BLD LC/MS/MS-MCNC: 233 UG/L (ref 50–400)
EGFRCR SERPLBLD CKD-EPI 2021: 81 ML/MIN/1.73M2
ERYTHROCYTE [DISTWIDTH] IN BLOOD BY AUTOMATED COUNT: 15.3 % (ref 10–15)
GLUCOSE SERPL-MCNC: 98 MG/DL (ref 70–99)
HCO3 SERPL-SCNC: 23 MMOL/L (ref 22–29)
HCT VFR BLD AUTO: 31.8 % (ref 35–47)
HGB BLD-MCNC: 10.2 G/DL (ref 11.7–15.7)
MAGNESIUM SERPL-MCNC: 1.9 MG/DL (ref 1.7–2.3)
MCH RBC QN AUTO: 28.7 PG (ref 26.5–33)
MCHC RBC AUTO-ENTMCNC: 32.1 G/DL (ref 31.5–36.5)
MCV RBC AUTO: 89 FL (ref 78–100)
PHOSPHATE SERPL-MCNC: 4.6 MG/DL (ref 2.5–4.5)
PLATELET # BLD AUTO: 266 10E3/UL (ref 150–450)
POTASSIUM SERPL-SCNC: 4.4 MMOL/L (ref 3.4–5.3)
PROT SERPL-MCNC: 7.4 G/DL (ref 6.4–8.3)
RBC # BLD AUTO: 3.56 10E6/UL (ref 3.8–5.2)
SODIUM SERPL-SCNC: 135 MMOL/L (ref 135–145)
TME LAST DOSE: NORMAL H
TME LAST DOSE: NORMAL H
WBC # BLD AUTO: 3.5 10E3/UL (ref 4–11)

## 2024-08-22 PROCEDURE — 80321 ALCOHOLS BIOMARKERS 1OR 2: CPT | Mod: ORL | Performed by: NURSE PRACTITIONER

## 2024-08-22 PROCEDURE — 80158 DRUG ASSAY CYCLOSPORINE: CPT | Mod: ORL | Performed by: NURSE PRACTITIONER

## 2024-08-22 PROCEDURE — 85027 COMPLETE CBC AUTOMATED: CPT | Mod: ORL | Performed by: NURSE PRACTITIONER

## 2024-08-22 PROCEDURE — 80053 COMPREHEN METABOLIC PANEL: CPT | Mod: ORL | Performed by: NURSE PRACTITIONER

## 2024-08-22 PROCEDURE — 83735 ASSAY OF MAGNESIUM: CPT | Mod: ORL | Performed by: NURSE PRACTITIONER

## 2024-08-22 PROCEDURE — 84100 ASSAY OF PHOSPHORUS: CPT | Mod: ORL | Performed by: NURSE PRACTITIONER

## 2024-08-22 NOTE — TELEPHONE ENCOUNTER
[facility] for [facility]. This request was denied.     Talked to patient and her  on the phone. [facility], but home infusion nursing services may not be covered under patient's Medicare plan. Explained the risks of transferring the PA and concern for delays in care. Encouraged patient to complete upcoming Remicade infusion on 9/9 before transferring any care. After a long discussion about risks and benefits of home infusion, the patient decided to make to changes to her plan of care. She will continue with infusions at [facility].    [facility].

## 2024-08-22 NOTE — TELEPHONE ENCOUNTER
Left Voicemail (1st Attempt) and Sent Mychart (1st Attempt) for the patient to call back and schedule the following:    Appointment type: Covert appt to virtual, or resched next avail  Provider: Dr. Lugo  Return date: 8/26/24  Specialty phone number: [phone]  Additional appointment(s) needed:   Additonal Notes:

## 2024-08-23 DIAGNOSIS — Z94.4 HISTORY OF LIVER TRANSPLANT (H): ICD-10-CM

## 2024-08-23 DIAGNOSIS — Z94.4 S/P LIVER TRANSPLANT (H): ICD-10-CM

## 2024-08-23 RX ORDER — CYCLOSPORINE 25 MG/1
25 CAPSULE, LIQUID FILLED ORAL PRN
COMMUNITY
Start: 2024-08-23 | End: 2024-09-06

## 2024-08-23 RX ORDER — CYCLOSPORINE 100 MG/1
200 CAPSULE, LIQUID FILLED ORAL 2 TIMES DAILY
Qty: 120 CAPSULE | Refills: 11 | Status: SHIPPED | OUTPATIENT
Start: 2024-08-23 | End: 2024-09-06

## 2024-08-23 NOTE — LETTER
OUTPATIENT LABORATORY TEST ORDER   Home Care   Fax#[phone]     Patient Name: Abiola Matute      YOB: 1965        [facility] MR# [if applicable]: [id number]   Date & Time: August 23, 2024  Expiration Date: 1 year after date issued         Diagnosis:      Liver Transplant (ICD-10 Z94.4)   Aftercare following organ transplant (ICD-10 Z48.288)   Long term use of medications (ICD-10 Z79.899)   Contact with and (suspected) exposure to other viral communicable                       diseases (Z20.828)      We ask your assistance in obtaining the following laboratory tests, which are part of our routine surveillance program for Solid Organ Transplant patients.      Please fax each result to [phone], same day as resulted/available    Critical lab results page [phone]        Monthly next due September  Phoshatdylethanol (PeTH)       Labs weekly  CBC with Platelets   Basic Metabolic Panel   Phosphorous  Magnesium  Hepatic panel        Cyclosporine drug level - 12-hour trough, please document time of last dose                               If you have any questions, please call [facility]- [phone] or [phone], Fax- [phone].      Pravin Ball MD, DEBRA  Professor of Surgery  [employer]  Surgical Director of Liver Transplant Program  Executive Medical Director of Pediatric Transplantation

## 2024-08-23 NOTE — TELEPHONE ENCOUNTER
ISSUE:   Cyclosporine level 233 on 8, /22 goal 200, dose 225 mg BID.    PLAN:   Call Patient and confirm this was an accurate 12-hour trough.   Verify Cyclosporine dose 225 mg BID.   Confirm no new medications or or missed doses.   Confirm no new illness / infection / diarrhea.   If accurate trough and accurate dose, decrease Cyclosporine dose to 200 mg BID     Is this more than a 50% increase or decrease in current IS dose: No  If YES, justification:     Repeat labs in monday .  *If > 50% change in immunosuppression dose, repeat labs in 1 week.   Zayra Paulino RN      OUTCOME:   Spoke with Albino, they confirm accurate trough level and current dose 225 mg BID.   Albino confirmed dose change to 200 mg BID.  Albino agreed to repeat labs in 1 weeks.   Orders sent to preferred pharmacy for dose change and lab for repeat labs.   [facility]ino voiced understanding of plan.     Michelle Starks LPN

## 2024-08-26 ENCOUNTER — PRE VISIT (OUTPATIENT)
Dept: NEUROLOGY | Facility: CLINIC | Age: 59
End: 2024-08-26

## 2024-08-26 ENCOUNTER — TELEPHONE (OUTPATIENT)
Dept: TRANSPLANT | Facility: CLINIC | Age: 59
End: 2024-08-26

## 2024-08-26 ENCOUNTER — VIRTUAL VISIT (OUTPATIENT)
Dept: NEUROLOGY | Facility: CLINIC | Age: 59
End: 2024-08-26
Payer: MEDICARE

## 2024-08-26 DIAGNOSIS — R56.9 SEIZURE (H): ICD-10-CM

## 2024-08-26 DIAGNOSIS — G40.109 LOCALIZATION-RELATED FOCAL EPILEPSY WITH SIMPLE PARTIAL SEIZURES (H): Primary | ICD-10-CM

## 2024-08-26 PROCEDURE — 99417 PROLNG OP E/M EACH 15 MIN: CPT | Mod: 24 | Performed by: INTERNAL MEDICINE

## 2024-08-26 PROCEDURE — G2211 COMPLEX E/M VISIT ADD ON: HCPCS | Performed by: INTERNAL MEDICINE

## 2024-08-26 PROCEDURE — 99205 OFFICE O/P NEW HI 60 MIN: CPT | Mod: 24 | Performed by: INTERNAL MEDICINE

## 2024-08-26 RX ORDER — DIAZEPAM 7.5 MG/100UL
7.5 SPRAY NASAL PRN
Qty: 2 EACH | Refills: 2 | Status: SHIPPED | OUTPATIENT
Start: 2024-08-26 | End: 2024-09-25

## 2024-08-26 NOTE — TELEPHONE ENCOUNTER
Al[facility]ino calls and wanted the keppra sent in for a 90 day supply to the specialty. Pt had a new pt visit with neurology today and gave [facility] the number to contact their office to discuss.

## 2024-08-26 NOTE — PROGRESS NOTES
[facility] Epilepsy Care Progress Note    Patient:  Abiola Matute  :  1965   Age:  59 year old   Today's Office Visit:  2024    Epilepsy Data:   Ms Matute is a 59 year old woman with a medical history significant crohn's disease on infliximab and cirrhosis c/b HCC s/p recent liver transplant on 2024.   Her first seizure was in , witnessed by her . She was repeatedly     She was initially tacrolimus on         SPELLS   1  She was aware of her surroudings but couldn't follow commands.   Duration: < 7 minutes

## 2024-08-26 NOTE — PROGRESS NOTES
Virtual Visit Details    Type of service:  Video Visit   Video Start Time:  10:10  Video End Time: 10:58    Originating Location (pt. Location): Home    Distant Location (provider location):  Off-site  Platform used for Video Visit: Other: virtual    Patient:  Abiola Matute  :  1965   Age:  59 year old   Today's Office Visit:  2024     Epilepsy Data:  Ms Matute is a 59 year old woman with a medical history significant crohn's disease on infliximab and cirrhosis c/b HCC s/p recent liver transplant on 2024.   Her first seizure was in , witnessed by her . Prior to that she underwent a liver transplant on , which was complicated by septic shock, requiring ICU admission. She was subsequently discharged on . Her  observed that she initially made unusual movements with her right arm, then her left arm, and turned her head to the right before experiencing a generalized tonic-clonic convulsion. Following this event, she returned to the hospital, where she reportedly had a similar seizure aborted by lorazepam. An MRI brain showed trace subdural hemorrhage overlying the right occipital lobe without mass effect. There was mild pachymeningeal enhancement over the right convexity, suggesting a reactive change. Given her immunocompromised state and a history of fever one week prior with new-onset seizures, a lumbar puncture (LP) was recommended and showed no evidence of infection. The cerebrospinal fluid (CSF) analysis showed 0 WBC, protein 49, glucose 68, and was negative for HSV 1 and 2 and the meningitis panel. A CT scan of the head did not show progression of the trace subdural hemorrhage in the right occipital lobe. She was discharged with maintenance Keppra (levetiracetam) 750 mg twice daily.  The following day, her  witnessed several episodes characterized by staring and an inability to speak. She reportedly returned to her baseline state between the seizures and  was aware that something unusual had occurred. She could hear her  but was unable to respond.  She was readmitted, and the levetiracetam dose was increased to 1000 mg twice daily. She underwent continuous EEG monitoring from  to July 15, during which several electroclinical and electrographic seizures were captured. Despite attempts to use IV benzodiazepines and increase levetiracetam, seizure onset was poorly defined, showing bilateral activity in the central head region, more prominent on the right. Immunosuppression management was adjusted, and tacrolimus was switched to cyclosporine at discharge.  Since her discharge, she has had no episodes of concerning symptoms such as seizures, staring spells, automatisms, or loss of consciousness. She reports that she is mostly doing well on levetiracetam, although she feels somewhat less alert and slower in her responses. Despite these side effects, she wishes to continue with the current medication as she is able to cope with them.  She has no concerning , , or immediate  issues from her birth. 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 She denies any history of head trauma, strokes, brain malignancy, or a family history of epilepsy.           SPELL 1 - Staring, aware of her surroudings but couldn't follow commands. No automatisms  Duration: 7- 10  minutes. Frequent episodes from  -   SPELL 2 - right arm movement, right head turn and GTC < 5 minutes. 2 episodes    ASM - Levetiracetam, SE - brain fogginess    PRIOR WORK-UP  CSF panel - CSF 0 WBC, protein 49, glucose 68, HSV 1 and 2 and meningitis panel negative.  MRI brain 07/10:    1.  No recent infarct or intracranial mass.  2.  Trace subdural hemorrhage overlying the right occipital lobe without mass effect. There is mild pachymeningeal enhancement overlying the right convexity favored to reflect a reactive change.  3.  Nonspecific symmetric  signal changes involving the corticospinal tracts which can be seen in the setting of chronic hepatic encephalopathy.    cEEG monitoring 7/12 - 7/15  Activity normal during 7 structures of the recording.  Background activity consistent with mild to moderate diffuse encephalopathy during other portions of the recording.  Multiple electroclinical seizures were noted during first 2 days of recording.  Clinically patient appeared less responsive to commands, took longer to answer simple questions such as her name, and had difficulty remembering items.  Her  reported her to be confused and routinely pressed event marker.  EEG during these revealed rhythmic delta which gave way to rhythmic sharp waves which gradually slowed and became more periodic.  Discharge was seen in the central regions and bilaterally but was often more persistent on the right.  Electrographic seizures lasted as long as 15 minutes.  Seizures appear to stop after lorazepam 2 mg was administered on the evening of the second day of recording, and levetiracetam was increased to 2000 mg/day.  No further seizures were seen in the last 2 days of recording.     PHYSICAL EXAMINATION  This was limited by the virtual visit.  The patient did move all limbs spontaneously and could move her eyes in all of the visual fields.          Impression  Focal seizures with impaired awareness in the context of Liver transplant  Focal status epilepticus  Acute repetitive seizures  Ms. Matute is a 59-year-old woman with a history of acute repetitive seizures and focal status epilepticus in the context of a recent liver transplant. She was initially hospitalized for two witnessed generalized tonic-clonic seizures, for which levetiracetam was started. Following this, she experienced acute repetitive focal seizures and focal status epilepticus, which were captured on continuous EEG monitoring. These episodes abated with an increase in levetiracetam dosage from 750 mg  twice daily to 1000 mg twice daily, as well as a switch in immunosuppressive agents from tacrolimus to cyclosporine.  She is currently doing very well on levetiracetam 1000 mg twice daily with no significant side effects, although she reports feeling cognitively slower and having less of a filter.  They had several questions today, including inquiries about the course of the seizures. I explained that case series have shown that seizures can occur for up to 6 weeks after a liver transplant, so this is not uncommon. These seizures are often reported to occur due to immunosuppression therapy, with tacrolimus being the medication most commonly implicated, as well as due to metabolic derangements and infections. My suspicion is that tacrolimus was likely involved in her case. However, given that it was a focal seizure, she likely had a nidus of epileptogenicity. The MRI of the brain did show subdural hemorrhage overlying the right occipital convexity and to my eye minimal hyperintensities in the bilateral temporal lobes, more pronounced on the right than the left, although these could be reactive due to the repetitive seizures.  They asked about the possibility of discontinuing the medications. I discussed that it is a possibility, especially since this was likely a provoked seizure. However, given that she had focal status epilepticus and acute repetitive seizures, I would not recommend hastening the withdrawal of medications. I believe the discussion can be revisited in about 2 years if she remains seizure-free, with normal prolonged EEG and MRI results.      PLAN  Continue Levetiracetam 1000 mg BID  Abortive medications - Intranasal diazepam 7.5 mg/ml 1 squirt in each nostril.  To be used for GTC greater than 3 to 4 minutes, focal seizures [smaller seizures] greater than 10 minutes, 3 or more GTC or focal seizures within 24 hours      Seizure Safety Precautions     it is important to us that you are supported to be  as independent as possible while reducing the risks that come with seizures. The #1 most important thing you can do to prevent seizures is to take your anti-seizure medication on time, every time. You will support the effectiveness of your medication to control seizures by taking good care of yourself through adequate sleep, nutrition, mental health and stress management and avoiding substances that worsen seizures such as caffeine, diet pills, energy drinks, alcohol, etc. Sometimes, despite taking your medications faithfully, seizures will reoccur. Below are guidelines to follow that will reduce the risk of injury during a seizure.    Rule of thumb: Avoid any activities that might lead to self-injury or injury of others following any events with impaired awareness or impaired motor control.     Safety at home: Use the back burners of the stove when cooking, use long oven mitts with use of the stove, microwaves should be on the counter not mounted up on the wall. Use coffee pots and other heated electronics that have the ability to automatically turn off. Carpeted floors with thick padding is preferred to hard wood floors or tile. Don't use objects with fire when you are home alone (I.e. candles, fireplace, cigarette smoking, etc.) Use chairs with armrests. Use electric or motor equipment that only functions while a safety handle is engaged such as a  which would turn off if you let go of the trigger. Some families use video or audio monitors to identify nighttime seizures so first aid can be provided.     Safety with activities of daily living: Shower instead of taking a bath, use a hand held shower head, be sure the water doesn't pool at the bottom of the tub, do not lock the bathroom door. Consider a shower chair or sitting in the tub using the shower head.     Safety at work: Consider sharing your condition with a coworker whom can learn seizure first aid in case a seizure occurs at work. Avoid  employment where you would need to hold or bathe babies, supervise young children or vulnerable adults, operate heavy machinery, carry or lift heavy items above your head, utilize firearms, be exposed to heights from which you might fall, or be exposed to vessels with hot cooking oil or water.     Safety with recreation: Strenuous activity is not believed to trigger seizures in persons with epilepsy. Wear helmets for active sports, biking or other activities in which head injury could occur. Avoid extreme sports such as scuba diving, mireya diving, rock climbing, etc. Wear a life jacket when near bodies of water including fishing. Use caution around heat and open flames such as campfires or grills. Consider using GPS devices including smart phones for family to track your location and/or use a seizure detection device.    RTC in 6 months     Total time in person today 50 minutes. Total 30 minutes reviewing chart prior to visit. Total 20 minutes generating note and coordinating care following visit. Total time on day of visit 100 minutes.

## 2024-08-26 NOTE — LETTER
2024       RE: Abiola Matute  [address] [facility] [id number]     Dear Colleague,    Thank you for referring your patient, Abiola Matute, to the [facility] [place] [facility]. Please see a copy of my visit note below.    Virtual Visit Details    Type of service:  Video Visit   Video Start Time:  10:10  Video End Time: 10:58    Originating Location (pt. Location): Home    Distant Location (provider location):  Off-site  Platform used for Video Visit: Other: virtual    Patient:  Abiola Matute  :  1965   Age:  59 year old   Today's Office Visit:  2024     Epilepsy Data:  Ms Matute is a 59 year old woman with a medical history significant crohn's disease on infliximab and cirrhosis c/b HCC s/p recent liver transplant on 2024.   Her first seizure was in , witnessed by her . Prior to that she underwent a liver transplant on , which was complicated by septic shock, requiring ICU admission. She was subsequently discharged on . Her  observed that she initially made unusual movements with her right arm, then her left arm, and turned her head to the right before experiencing a generalized tonic-clonic convulsion. Following this event, she returned to the hospital, where she reportedly had a similar seizure aborted by lorazepam. An MRI brain showed trace subdural hemorrhage overlying the right occipital lobe without mass effect. There was mild pachymeningeal enhancement over the right convexity, suggesting a reactive change. Given her immunocompromised state and a history of fever one week prior with new-onset seizures, a lumbar puncture (LP) was recommended and showed no evidence of infection. The cerebrospinal fluid (CSF) analysis showed 0 WBC, protein 49, glucose 68, and was negative for HSV 1 and 2 and the meningitis panel. A CT scan of the head did not show progression  of the trace subdural hemorrhage in the right occipital lobe. She was discharged with maintenance Keppra (levetiracetam) 750 mg twice daily.  The following day, her  witnessed several episodes characterized by staring and an inability to speak. She reportedly returned to her baseline state between the seizures and was aware that something unusual had occurred. She could hear her  but was unable to respond.  She was readmitted, and the levetiracetam dose was increased to 1000 mg twice daily. She underwent continuous EEG monitoring from  to July 15, during which several electroclinical and electrographic seizures were captured. Despite attempts to use IV benzodiazepines and increase levetiracetam, seizure onset was poorly defined, showing bilateral activity in the central head region, more prominent on the right. Immunosuppression management was adjusted, and tacrolimus was switched to cyclosporine at discharge.  Since her discharge, she has had no episodes of concerning symptoms such as seizures, staring spells, automatisms, or loss of consciousness. She reports that she is mostly doing well on levetiracetam, although she feels somewhat less alert and slower in her responses. Despite these side effects, she wishes to continue with the current medication as she is able to cope with them.  She has no concerning , , or immediate  issues from her birth. 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 She denies any history of head trauma, strokes, brain malignancy, or a family history of epilepsy.           SPELL 1 - Staring, aware of her surroudings but couldn't follow commands. No automatisms  Duration: 7- 10  minutes. Frequent episodes from  -   SPELL 2 - right arm movement, right head turn and GTC < 5 minutes. 2 episodes    ASM - Levetiracetam, SE - brain fogginess    PRIOR WORK-UP  CSF panel - CSF 0 WBC, protein 49, glucose 68, HSV 1 and  2 and meningitis panel negative.  MRI brain 07/10:    1.  No recent infarct or intracranial mass.  2.  Trace subdural hemorrhage overlying the right occipital lobe without mass effect. There is mild pachymeningeal enhancement overlying the right convexity favored to reflect a reactive change.  3.  Nonspecific symmetric signal changes involving the corticospinal tracts which can be seen in the setting of chronic hepatic encephalopathy.    cEEG monitoring 7/12 - 7/15  Activity normal during 7 structures of the recording.  Background activity consistent with mild to moderate diffuse encephalopathy during other portions of the recording.  Multiple electroclinical seizures were noted during first 2 days of recording.  Clinically patient appeared less responsive to commands, took longer to answer simple questions such as her name, and had difficulty remembering items.  Her  reported her to be confused and routinely pressed event marker.  EEG during these revealed rhythmic delta which gave way to rhythmic sharp waves which gradually slowed and became more periodic.  Discharge was seen in the central regions and bilaterally but was often more persistent on the right.  Electrographic seizures lasted as long as 15 minutes.  Seizures appear to stop after lorazepam 2 mg was administered on the evening of the second day of recording, and levetiracetam was increased to 2000 mg/day.  No further seizures were seen in the last 2 days of recording.     PHYSICAL EXAMINATION  This was limited by the virtual visit.  The patient did move all limbs spontaneously and could move her eyes in all of the visual fields.          Impression  Focal seizures with impaired awareness in the context of Liver transplant  Focal status epilepticus  Acute repetitive seizures  Ms. Matute is a 59-year-old woman with a history of acute repetitive seizures and focal status epilepticus in the context of a recent liver transplant. She was initially  hospitalized for two witnessed generalized tonic-clonic seizures, for which levetiracetam was started. Following this, she experienced acute repetitive focal seizures and focal status epilepticus, which were captured on continuous EEG monitoring. These episodes abated with an increase in levetiracetam dosage from 750 mg twice daily to 1000 mg twice daily, as well as a switch in immunosuppressive agents from tacrolimus to cyclosporine.  She is currently doing very well on levetiracetam 1000 mg twice daily with no significant side effects, although she reports feeling cognitively slower and having less of a filter.  They had several questions today, including inquiries about the course of the seizures. I explained that case series have shown that seizures can occur for up to 6 weeks after a liver transplant, so this is not uncommon. These seizures are often reported to occur due to immunosuppression therapy, with tacrolimus being the medication most commonly implicated, as well as due to metabolic derangements and infections. My suspicion is that tacrolimus was likely involved in her case. However, given that it was a focal seizure, she likely had a nidus of epileptogenicity. The MRI of the brain did show subdural hemorrhage overlying the right occipital convexity and to my eye minimal hyperintensities in the bilateral temporal lobes, more pronounced on the right than the left, although these could be reactive due to the repetitive seizures.  They asked about the possibility of discontinuing the medications. I discussed that it is a possibility, especially since this was likely a provoked seizure. However, given that she had focal status epilepticus and acute repetitive seizures, I would not recommend hastening the withdrawal of medications. I believe the discussion can be revisited in about 2 years if she remains seizure-free, with normal prolonged EEG and MRI results.      PLAN  Continue Levetiracetam 1000 mg  BID  Abortive medications - Intranasal diazepam 7.5 mg/ml 1 squirt in each nostril.  To be used for GTC greater than 3 to 4 minutes, focal seizures [smaller seizures] greater than 10 minutes, 3 or more GTC or focal seizures within 24 hours    RTC in 6 months     Total time in person today 50 minutes. Total 30 minutes reviewing chart prior to visit. Total 20 minutes generating note and coordinating care following visit. Total time on day of visit 100 minutes.       [facility] Epilepsy Care Progress Note    Patient:  Abiola Matute  :  1965   Age:  59 year old   Today's Office Visit:  2024    Epilepsy Data:   Ms Matute is a 59 year old woman with a medical history significant crohn's disease on infliximab and cirrhosis c/b HCC s/p recent liver transplant on 2024.   Her first seizure was in , witnessed by her . She was repeatedly     She was initially tacrolimus on         SPELLS   1  She was aware of her surroudings but couldn't follow commands.   Duration: < 7 minutes      Again, thank you for allowing me to participate in the care of your patient.      Sincerely,    Soo Lugo MD

## 2024-08-26 NOTE — NURSING NOTE
Current patient location: [address] [place] [id number]    Is the patient currently in the state of [place]? YES    Visit mode:VIDEO    If the visit is dropped, the patient can be reconnected by: TELEPHONE VISIT: Phone number:   Telephone Information:   Mobile [phone]       Will anyone else be joining the visit? NO  (If patient encounters technical issues they should call [phone]    How would you like to obtain your AVS? MyChart    Are changes needed to the allergy or medication list? Pt stated no med changes    Are refills needed on medications prescribed by this physician? NO    Rooming Documentation:  Questionnaire(s) completed      Reason for visit: Video Visit (Seizure )    Sandra HEARN

## 2024-08-27 ENCOUNTER — TELEPHONE (OUTPATIENT)
Dept: FAMILY MEDICINE | Facility: CLINIC | Age: 59
End: 2024-08-27
Payer: MEDICARE

## 2024-08-27 NOTE — TELEPHONE ENCOUNTER
Home Health Care      Reason for call:      [facility] - Order #[id number] - cert period: 06/29/24 - 08/27/24 - PT effective 08/18/24 1wk1    Orders are needed for this patient.  above    Pt Provider: Renae    Phone Number Homecare Nurse can be reached at: fax       Could we send this information to you in [employer] or would you prefer to receive a phone call?:   na    Put in providers in box    Noemi PAN

## 2024-08-28 ENCOUNTER — TELEPHONE (OUTPATIENT)
Dept: PHARMACY | Facility: CLINIC | Age: 59
End: 2024-08-28
Payer: MEDICARE

## 2024-08-28 ENCOUNTER — MEDICAL CORRESPONDENCE (OUTPATIENT)
Dept: HEALTH INFORMATION MANAGEMENT | Facility: CLINIC | Age: 59
End: 2024-08-28
Payer: MEDICARE

## 2024-08-28 NOTE — TELEPHONE ENCOUNTER
Clinical Pharmacy Consult:                                                      Transplant Specific: 2 month post transplant pharmacy review.   Date of Transplant: 06/22/2024  Type of Transplant: liver  First Transplant: yes  History of rejection: no    Immunosuppression Regimen   Prednisone 5 mg qAM,  mg qAM & 750 mg qPM, and  mg qAM & 200 mg qPM  Patient specific goal: ~200  Most recent level: 233, date 08/22/2024  Immunosuppressant Levels:  Therapeutic  Pt adherent to lab draws: yes  Scr:   Lab Results   Component Value Date    CR 0.69 07/22/2024    CR 0.71 02/23/2021     Side effects:     Prophylactic Medications  PJP Prophylactic: Inhaled Pentamidine 300mg q 4 weeks  Scheduled Discontinue Date: 6 months Anticipated date 12/22/2024    Antifungal: Not needed thus far  Scheduled Discontinue Date: N/A Anticipated date      CMV Prophylactic: CrCl 40 to 59 mL/minute: Valcyte 450 mg once daily   Scheduled Discontinue Date: 6 months Anticipated date 12/22/2024    Acid Reducer: Protonix (pantoprazole)  Scheduled Reviewed Date:  N/a    Vascular Prophylactic: Aspirin 81 mg PO daily  Scheduled Discontinue Date:  MD to evaluate    Blood Pressure Management:   Patient blood pressure goal: <140/90 mmHg  Frequency of home blood pressure checks: once daily  Most recent home BP: 124/76  Patient blood pressure at goal:  Yes  Hospitalizations/ER visits since last assessment: 0.     Med rec/DUR performed: yes.   Med rec discrepancies: no.    Spoke with Albino today via phone. He reports Virginia is doing well. She is trying to get out and do more exercise lately. Albino denies any side effects, missed doses, or difficulty getting refills. He is just disappointed he can't get all of her medications through [facility]. Albino was able to recite all of Virginia's doses from memory. The only concern he had was in regards to the cost of Valtoco. Informed him to check the 's website for a copay card or discuss with the provider  about an alternative.    Galen reports Virginia's blood pressure gets taken daily and is at goal.    Time Spent: 15 minutes    No questions or concerns for care team. Follow up with MTM in 1 month.    Chalino Keyes, PharmD  [phone]

## 2024-08-28 NOTE — TELEPHONE ENCOUNTER
[id number]  Faxed back to [phone]  Sent to [facility]  And filed in the north drawer

## 2024-08-29 ENCOUNTER — LAB REQUISITION (OUTPATIENT)
Dept: LAB | Facility: CLINIC | Age: 59
End: 2024-08-29
Payer: MEDICARE

## 2024-08-29 ENCOUNTER — TELEPHONE (OUTPATIENT)
Dept: NEUROLOGY | Facility: CLINIC | Age: 59
End: 2024-08-29

## 2024-08-29 DIAGNOSIS — R56.9 SEIZURE (H): ICD-10-CM

## 2024-08-29 DIAGNOSIS — Z79.899 OTHER LONG TERM (CURRENT) DRUG THERAPY: ICD-10-CM

## 2024-08-29 DIAGNOSIS — Z48.288 ENCOUNTER FOR AFTERCARE FOLLOWING MULTIPLE ORGAN TRANSPLANT: ICD-10-CM

## 2024-08-29 DIAGNOSIS — Z20.828 CONTACT WITH AND (SUSPECTED) EXPOSURE TO OTHER VIRAL COMMUNICABLE DISEASES: ICD-10-CM

## 2024-08-29 DIAGNOSIS — Z94.4 LIVER TRANSPLANT STATUS (H): ICD-10-CM

## 2024-08-29 LAB
ALBUMIN SERPL BCG-MCNC: 3.9 G/DL (ref 3.5–5.2)
ALP SERPL-CCNC: 151 U/L (ref 40–150)
ALT SERPL W P-5'-P-CCNC: 23 U/L (ref 0–50)
ANION GAP SERPL CALCULATED.3IONS-SCNC: 9 MMOL/L (ref 7–15)
AST SERPL W P-5'-P-CCNC: 22 U/L (ref 0–45)
BILIRUB DIRECT SERPL-MCNC: <0.2 MG/DL (ref 0–0.3)
BILIRUB SERPL-MCNC: 0.5 MG/DL
BUN SERPL-MCNC: 23.8 MG/DL (ref 8–23)
CALCIUM SERPL-MCNC: 9.1 MG/DL (ref 8.8–10.4)
CHLORIDE SERPL-SCNC: 104 MMOL/L (ref 98–107)
CREAT SERPL-MCNC: 0.81 MG/DL (ref 0.51–0.95)
CYCLOSPORINE BLD LC/MS/MS-MCNC: 255 UG/L (ref 50–400)
EGFRCR SERPLBLD CKD-EPI 2021: 83 ML/MIN/1.73M2
ERYTHROCYTE [DISTWIDTH] IN BLOOD BY AUTOMATED COUNT: 14.9 % (ref 10–15)
GLUCOSE SERPL-MCNC: 95 MG/DL (ref 70–99)
HCO3 SERPL-SCNC: 25 MMOL/L (ref 22–29)
HCT VFR BLD AUTO: 34.5 % (ref 35–47)
HGB BLD-MCNC: 10.9 G/DL (ref 11.7–15.7)
MAGNESIUM SERPL-MCNC: 1.7 MG/DL (ref 1.7–2.3)
MCH RBC QN AUTO: 29.4 PG (ref 26.5–33)
MCHC RBC AUTO-ENTMCNC: 31.6 G/DL (ref 31.5–36.5)
MCV RBC AUTO: 93 FL (ref 78–100)
PHOSPHATE SERPL-MCNC: 4.7 MG/DL (ref 2.5–4.5)
PLATELET # BLD AUTO: 241 10E3/UL (ref 150–450)
POTASSIUM SERPL-SCNC: 3.8 MMOL/L (ref 3.4–5.3)
PROT SERPL-MCNC: 7.2 G/DL (ref 6.4–8.3)
RBC # BLD AUTO: 3.71 10E6/UL (ref 3.8–5.2)
SODIUM SERPL-SCNC: 138 MMOL/L (ref 135–145)
TME LAST DOSE: NORMAL H
TME LAST DOSE: NORMAL H
WBC # BLD AUTO: 2.6 10E3/UL (ref 4–11)

## 2024-08-29 PROCEDURE — 84100 ASSAY OF PHOSPHORUS: CPT | Mod: ORL | Performed by: TRANSPLANT SURGERY

## 2024-08-29 PROCEDURE — 83735 ASSAY OF MAGNESIUM: CPT | Mod: ORL | Performed by: TRANSPLANT SURGERY

## 2024-08-29 PROCEDURE — 80053 COMPREHEN METABOLIC PANEL: CPT | Mod: ORL | Performed by: TRANSPLANT SURGERY

## 2024-08-29 PROCEDURE — 80158 DRUG ASSAY CYCLOSPORINE: CPT | Mod: ORL | Performed by: TRANSPLANT SURGERY

## 2024-08-29 PROCEDURE — 85027 COMPLETE CBC AUTOMATED: CPT | Mod: ORL | Performed by: TRANSPLANT SURGERY

## 2024-08-29 PROCEDURE — 82248 BILIRUBIN DIRECT: CPT | Mod: ORL | Performed by: TRANSPLANT SURGERY

## 2024-08-29 RX ORDER — LEVETIRACETAM 1000 MG/1
1000 TABLET ORAL 2 TIMES DAILY
Qty: 180 TABLET | Refills: 3 | Status: SHIPPED | OUTPATIENT
Start: 2024-08-29 | End: 2024-09-03

## 2024-08-29 NOTE — TELEPHONE ENCOUNTER
Last seen: 8/26/24  RTC: 6 mo  Cancel: no  No-show: no  Next appt: none yet    Incoming refill from patient via phones    Medication requested: levETIRAcetam (KEPPRA) 1000 MG tablet   Directions: Take 1 tablet (1,000 mg) by mouth 2 times daily - Oral   Qty: 180 + 3   Last refilled: July 2024    Medication refill approved per refill protocol

## 2024-08-29 NOTE — TELEPHONE ENCOUNTER
[facility] Call Center    Phone Message    May a detailed message be left on voicemail: yes     Reason for Call: Medication Refill Request    Has the patient contacted the pharmacy for the refill? Yes   Name of medication being requested: levETIRAcetam (KEPPRA) 1000 MG tablet  Provider who prescribed the medication: Kulwant Phan MD  Pharmacy: "[facility] - FAX# [phone]  Date medication is needed: 08/30/2024   Patient's , Albino, calling to request refill from Dr Lugo for a 90 day supply to "[facility] home delivery pharmacy. Fax#[phone]  Albino states the patient has only 7 days left of the medication and are requesting a refill be sent as soon as possible so that she does not run out.     Action Taken: Message routed to:  Other: [facility]    Travel Screening: Not Applicable

## 2024-08-30 ENCOUNTER — MYC MEDICAL ADVICE (OUTPATIENT)
Dept: FAMILY MEDICINE | Facility: CLINIC | Age: 59
End: 2024-08-30
Payer: MEDICARE

## 2024-08-30 DIAGNOSIS — G40.109 LOCALIZATION-RELATED FOCAL EPILEPSY WITH SIMPLE PARTIAL SEIZURES (H): ICD-10-CM

## 2024-09-01 RX ORDER — LEVETIRACETAM 1000 MG/1
TABLET ORAL
Refills: 0 | OUTPATIENT
Start: 2024-09-01

## 2024-09-01 NOTE — TELEPHONE ENCOUNTER
Medication Requested:  levETIRAcetam (KEPPRA) 1000 MG tablet 180 tablet 3 8/29/2024 -- No   Sig - Route: Take 1 tablet (1,000 mg) by mouth 2 times daily. - Oral   Sent to pharmacy as: levETIRAcetam 1000 MG Oral Tablet (KEPPRA)   Class: E-Prescribe   Order: [id number]   E-Prescribing Status: Receipt confirmed by pharmacy (8/29/2024 11:55 AM CDT)       [facility] - [address]     ----------------------  Encounter details:   Pharmacy comment: YOUR PATIENT REQUESTED AND EXPRESSLY CONSENTED FOR THIS RX TO BE FILLED AT [facility]. THIS INFORMATION IS PATIENT PROVIDED, NOT CLAIM BASED. PLEASE VERIFY CURRENT THERAPY. 90-DAY SUPPLY REQUESTED     Duplicate: Script previously sent. E-Prescribing Status: Receipt confirmed by pharmacy (8/29/2024 11:55 AM CDT)

## 2024-09-01 NOTE — TELEPHONE ENCOUNTER
Medication Requested:  levETIRAcetam (KEPPRA) 1000 MG tablet 180 tablet 3 8/29/2024 -- No   Sig - Route: Take 1 tablet (1,000 mg) by mouth 2 times daily. - Oral   [facility] - [place], [place] - [address] [place] [address]   ----------------------    Encounter details:     Pharmacy comment: YOUR PATIENT REQUESTED AND EXPRESSLY CONSENTED FOR THIS RX TO BE FILLED AT [facility]. THIS INFORMATION IS PATIENT PROVIDED, NOT CLAIM BASED. PLEASE VERIFY CURRENT THERAPY. 90-DAY SUPPLY REQUESTED     Duplicate, script previously sent.   Order: [id number]   E-Prescribing Status: Receipt confirmed by pharmacy (8/29/2024 11:55 AM CDT)

## 2024-09-03 DIAGNOSIS — R56.9 SEIZURE (H): ICD-10-CM

## 2024-09-03 RX ORDER — LEVETIRACETAM 1000 MG/1
1000 TABLET ORAL 3 TIMES DAILY
Qty: 180 TABLET | Refills: 3 | Status: SHIPPED | OUTPATIENT
Start: 2024-09-03

## 2024-09-05 ENCOUNTER — TELEPHONE (OUTPATIENT)
Dept: FAMILY MEDICINE | Facility: CLINIC | Age: 59
End: 2024-09-05

## 2024-09-05 ENCOUNTER — LAB REQUISITION (OUTPATIENT)
Dept: LAB | Facility: CLINIC | Age: 59
End: 2024-09-05
Payer: MEDICARE

## 2024-09-05 ENCOUNTER — TELEPHONE (OUTPATIENT)
Dept: TRANSPLANT | Facility: CLINIC | Age: 59
End: 2024-09-05

## 2024-09-05 DIAGNOSIS — Z48.288 ENCOUNTER FOR AFTERCARE FOLLOWING MULTIPLE ORGAN TRANSPLANT: ICD-10-CM

## 2024-09-05 DIAGNOSIS — Z94.4 LIVER TRANSPLANT STATUS (H): ICD-10-CM

## 2024-09-05 DIAGNOSIS — Z79.899 OTHER LONG TERM (CURRENT) DRUG THERAPY: ICD-10-CM

## 2024-09-05 DIAGNOSIS — Z53.9 DIAGNOSIS NOT YET DEFINED: Primary | ICD-10-CM

## 2024-09-05 LAB
ALBUMIN SERPL BCG-MCNC: 3.8 G/DL (ref 3.5–5.2)
ALP SERPL-CCNC: 156 U/L (ref 40–150)
ALT SERPL W P-5'-P-CCNC: 26 U/L (ref 0–50)
ANION GAP SERPL CALCULATED.3IONS-SCNC: 10 MMOL/L (ref 7–15)
AST SERPL W P-5'-P-CCNC: 26 U/L (ref 0–45)
BILIRUB DIRECT SERPL-MCNC: <0.2 MG/DL (ref 0–0.3)
BILIRUB SERPL-MCNC: 0.7 MG/DL
BUN SERPL-MCNC: 26.8 MG/DL (ref 8–23)
CALCIUM SERPL-MCNC: 9.4 MG/DL (ref 8.8–10.4)
CHLORIDE SERPL-SCNC: 105 MMOL/L (ref 98–107)
CREAT SERPL-MCNC: 0.76 MG/DL (ref 0.51–0.95)
CYCLOSPORINE BLD LC/MS/MS-MCNC: 224 UG/L (ref 50–400)
EGFRCR SERPLBLD CKD-EPI 2021: 89 ML/MIN/1.73M2
ERYTHROCYTE [DISTWIDTH] IN BLOOD BY AUTOMATED COUNT: 14.6 % (ref 10–15)
GLUCOSE SERPL-MCNC: 95 MG/DL (ref 70–99)
HCO3 SERPL-SCNC: 25 MMOL/L (ref 22–29)
HCT VFR BLD AUTO: 34.2 % (ref 35–47)
HGB BLD-MCNC: 10.8 G/DL (ref 11.7–15.7)
MAGNESIUM SERPL-MCNC: 1.8 MG/DL (ref 1.7–2.3)
MCH RBC QN AUTO: 28.6 PG (ref 26.5–33)
MCHC RBC AUTO-ENTMCNC: 31.6 G/DL (ref 31.5–36.5)
MCV RBC AUTO: 91 FL (ref 78–100)
PHOSPHATE SERPL-MCNC: 4.5 MG/DL (ref 2.5–4.5)
PLATELET # BLD AUTO: 201 10E3/UL (ref 150–450)
POTASSIUM SERPL-SCNC: 4.1 MMOL/L (ref 3.4–5.3)
PROT SERPL-MCNC: 7 G/DL (ref 6.4–8.3)
RBC # BLD AUTO: 3.78 10E6/UL (ref 3.8–5.2)
SODIUM SERPL-SCNC: 140 MMOL/L (ref 135–145)
TME LAST DOSE: NORMAL H
TME LAST DOSE: NORMAL H
WBC # BLD AUTO: 1.7 10E3/UL (ref 4–11)

## 2024-09-05 PROCEDURE — 80158 DRUG ASSAY CYCLOSPORINE: CPT | Mod: ORL | Performed by: NURSE PRACTITIONER

## 2024-09-05 PROCEDURE — G0179 MD RECERTIFICATION HHA PT: HCPCS | Performed by: NURSE PRACTITIONER

## 2024-09-05 PROCEDURE — 85027 COMPLETE CBC AUTOMATED: CPT | Mod: ORL | Performed by: NURSE PRACTITIONER

## 2024-09-05 PROCEDURE — 80053 COMPREHEN METABOLIC PANEL: CPT | Mod: ORL | Performed by: NURSE PRACTITIONER

## 2024-09-05 PROCEDURE — 83735 ASSAY OF MAGNESIUM: CPT | Mod: ORL | Performed by: NURSE PRACTITIONER

## 2024-09-05 PROCEDURE — 84100 ASSAY OF PHOSPHORUS: CPT | Mod: ORL | Performed by: NURSE PRACTITIONER

## 2024-09-05 NOTE — TELEPHONE ENCOUNTER
Susanna calls on behalf of the pt and wants to know if this last CSA level requires a dose change. Please advise.

## 2024-09-05 NOTE — TELEPHONE ENCOUNTER
[facility] #[id number]  Faxed back to [phone]  Sent to [facility]  And field in the [facility].    Med list and letter

## 2024-09-05 NOTE — TELEPHONE ENCOUNTER
Forms/Letter Request    Type of form/letter: Nursing Home/Assisted Living Orders  [facility] # [id number]  Do we have the form/letter: Yes: given to AVA Macdonald    Who is the form from? Home care    Where did/will the form come from? form was faxed in    When is form/letter needed by: when availalbe    How would you like the form/letter returned: Fax : [phone]

## 2024-09-06 ENCOUNTER — TELEPHONE (OUTPATIENT)
Dept: TRANSPLANT | Facility: CLINIC | Age: 59
End: 2024-09-06
Payer: MEDICARE

## 2024-09-06 DIAGNOSIS — Z94.4 S/P LIVER TRANSPLANT (H): ICD-10-CM

## 2024-09-06 DIAGNOSIS — Z94.4 HISTORY OF LIVER TRANSPLANT (H): ICD-10-CM

## 2024-09-06 NOTE — TELEPHONE ENCOUNTER
ISSUE:   Cyclosporine level 224 on 9/5, goal 200, dose 200 mg BID.    PLAN:   Call Patient and confirm this was an accurate 12-hour trough.   Verify Cyclosporine dose 200 mg BID.   Confirm no new medications or or missed doses.   Confirm no new illness / infection / diarrhea.   If accurate trough and accurate dose, decrease Cyclosporine dose to 175 mg BID     Is this more than a 50% increase or decrease in current IS dose: No  If YES, justification:     Repeat labs in next week. .  *If > 50% change in immunosuppression dose, repeat labs in 1 week.   Zayra Paulino RN    OUTCOME:   Albino left message, they confirm accurate trough level and current dose 200 mg BID.   Albino confirmed dose change to 175 mg BID.  Albino agreed to repeat labs in 1 weeks.   Orders sent to preferred pharmacy for dose change and lab for repeat labs.   Albino voiced understanding of plan through a phone message.    Michelle Starks LPN

## 2024-09-08 NOTE — PROGRESS NOTES
Oncological Surgery Progress Note     Subjective:   Patient reports feeling well. He is tolerating his FLD. He would like to trial something more substantial.     Objective:   Vitals:    09/07/24 2146 09/08/24 0533 09/08/24 0649 09/08/24 0651   BP: (!) 149/70  (!) 162/79 (!) 162/79   Pulse: 79 80 80 80   Resp: 16 16     Temp: 98.4 °F (36.9 °C) 98.1 °F (36.7 °C)     TempSrc: Oral Oral     SpO2: 98% 97%     Weight:       Height:              Intake/Output Summary (Last 24 hours) at 9/8/2024 0758  Last data filed at 9/7/2024 2200  Gross per 24 hour   Intake 414.33 ml   Output 110 ml   Net 304.33 ml       Physical Exam:   General- in no acute distress   HEENT- normocephalic, atraumatic   Cardiac- regular rate, well perfused   Pulm- unlabored respirations, symmetric chest rise   Abd-  Soft, non-distended, non-tender.   left open and packed with wet-to-dry dressing.  Foul odor from wound. Right Seun drains in place with feculent material . Left drain with SS drainage    Ext- moves all 4   Neuro- no focal deficits  Psych- cooperative     Recent Labs   Lab 09/08/24  0505   WBC 11.4*   RBC 4.30*   HGB 12.5*   HCT 37.8*          Recent Labs   Lab 09/08/24  0505 09/07/24  0523 09/06/24  0813   SODIUM 138 139 138   POTASSIUM 4.3 4.6 4.5   CHLORIDE 106 106 104   CO2 31 29 31   BUN 12 12 17   CREATININE 1.02 0.90 0.96   GLUCOSE 102* 104* 97   CALCIUM 9.2 8.8 8.3*       Imaging:     XR CHEST AP OR PA  Narrative: EXAM: Single view portable AP chest radiograph    CLINICAL INDICATION: PICC line.    COMPARISON: 8/31/2024.    FINDINGS: A right PICC line terminates in the superior vena cava. The  cardiac silhouette is prominent and the pulmonary vessels are unremarkable.  The lungs are well expanded and clear bilaterally. There is no pneumothorax  and the osseous structures are unremarkable.  Impression: 1. Right PICC line placement with mild cardiomegaly.    Electronically Signed by: BAMBI ULRICH M.D.   Signed on: 9/7/2024  Rapid Response Team Note    Assessment   A rapid response was called on Abiola Matute due to  Hyperlactatemia w/ LA 2.5 . This presentation is likely due to recent liver transplant, and mild hypovolemia with reports of loose stool. Received IVF overnight (500cc) for LA 2.1 and hypotension, then Lasix 20 mg IV x 1 this AM. Patient reports ongoing fatigue, though no fever, chills, abdominal pain, cough, CP, dysuria. On broad spectrum antibiotics. , /65, Afebrile, No O2 use.     ADDENDUM: Repeat LA 4.2 (2.5). Will obtain labs,CBC,CMP,  BCx2, UA, CXR. Please give 25g of 25% Albumin; Repeat LA in 2 hours. Continue Broad spectrum abx    Plan   -  Repeat La in 2 hours- if rising, favor use of albumin  -  The General Surgery primary team was paged and currently awaiting a response.  -  Disposition: The patient will remain on the current unit. We will continue to monitor this patient closely.  -  Reassessment and plan follow-up will be performed by the primary team    Sariah Mcghee PA-C  Rapid Response Team DINESH  Securely message with [employer]     Medical Decision Making       30 MINUTES SPENT BY ME on the date of service doing chart review, history, exam, documentation & further activities per the note.          Hospital Course   Brief Summary of events leading to rapid response:   RRT called for LA 2.5    Physical Exam   Vital Signs: Temp: 98.7  F (37.1  C) Temp src: Oral BP: 123/65 Pulse: 104   Resp: 18 SpO2: 100 % O2 Device: None (Room air)    Weight: 192 lbs 11.2 oz      Exam:   Physical Exam   Constitutional:  Fatigued female walking into room with  and nurse. Conversant. NAD. Well nourished, well developed, resting comfortably   HEENT:   Head: Normocephalic and atraumatic.   Eyes: Conjunctivae are normal. Pupils are equal, round, and reactive to light.  Pharynx has no erythema or exudate, mucous membranes are moist  Neck:   No adenopathy, no bony tenderness  Cardiovascular: Diminished at base  12:22 PM   Workstation ID: [id number]      Assessment/Plan:    \76 year old male with PMHx of  Asthma, A-fib, Colon adenocarcinoma, CAD, hypertension, GERD, Gout, Hyperlipidemia, Incisional hernia, Leukocytosis, Lung cancer, Small bowel cancer, Stroke presenting with bleeding from his midline incision.  Patient has elevated lactate and leukocytosis of 14.5. lactate normalized. Pt admitted given recent postop abdominal wound and work up concerning for enterocutaneous fistula. Will continue to manage conservatively    - continue IV antibiotics  - Continue FLD  - start TPN   - Pain control prn  - continue home meds    Seen & discussed plan with Dr. Nelson Tena MD , General Surgery         Please Perfect serve \"General surgery intern\" for question/concerns regarding patient.      bilaterally, with no wheezes or retractions. No respiratory distress.  GI: Soft with good bowel sounds.  Non-tender, non-distended, with no guarding, no rebound, no peritoneal signs.   Back:  No bony or CVA tenderness   Musculoskeletal:  No edema or clubbing   Skin: Extremities are warm, swollen and erythematous  Neurological: Alert and oriented to person, place, and time. Nonfocal exam  Psychiatric:  Normal mood and affect.      Sepsis Evaluation   The patient is known to have an infection.    NO EVIDENCE OF SEPSIS at this time.  Vital sign, physical exam, and lab findings are due to mild hypovolemia and recent liver transplant with suspected intra abdominal infection, ..

## 2024-09-09 ENCOUNTER — INFUSION THERAPY VISIT (OUTPATIENT)
Dept: INFUSION THERAPY | Facility: CLINIC | Age: 59
End: 2024-09-09
Attending: INTERNAL MEDICINE
Payer: MEDICARE

## 2024-09-09 ENCOUNTER — TELEPHONE (OUTPATIENT)
Dept: GASTROENTEROLOGY | Facility: CLINIC | Age: 59
End: 2024-09-09
Payer: MEDICARE

## 2024-09-09 ENCOUNTER — MEDICAL CORRESPONDENCE (OUTPATIENT)
Dept: HEALTH INFORMATION MANAGEMENT | Facility: CLINIC | Age: 59
End: 2024-09-09

## 2024-09-09 VITALS
RESPIRATION RATE: 18 BRPM | HEART RATE: 75 BPM | WEIGHT: 181.6 LBS | OXYGEN SATURATION: 99 % | SYSTOLIC BLOOD PRESSURE: 120 MMHG | TEMPERATURE: 98.9 F | DIASTOLIC BLOOD PRESSURE: 70 MMHG | BODY MASS INDEX: 30.22 KG/M2

## 2024-09-09 DIAGNOSIS — K50.119 CROHN'S DISEASE OF PERIANAL REGION WITH COMPLICATION (H): Primary | ICD-10-CM

## 2024-09-09 LAB
ALBUMIN SERPL BCG-MCNC: 4.1 G/DL (ref 3.5–5.2)
ALP SERPL-CCNC: 166 U/L (ref 40–150)
ALT SERPL W P-5'-P-CCNC: 32 U/L (ref 0–50)
AST SERPL W P-5'-P-CCNC: 37 U/L (ref 0–45)
BASOPHILS # BLD AUTO: 0 10E3/UL (ref 0–0.2)
BASOPHILS NFR BLD AUTO: 2 %
BILIRUB DIRECT SERPL-MCNC: 0.25 MG/DL (ref 0–0.3)
BILIRUB SERPL-MCNC: 1 MG/DL
CRP SERPL-MCNC: 31.11 MG/L
EOSINOPHIL # BLD AUTO: 0.1 10E3/UL (ref 0–0.7)
EOSINOPHIL NFR BLD AUTO: 7 %
ERYTHROCYTE [DISTWIDTH] IN BLOOD BY AUTOMATED COUNT: 14.4 % (ref 10–15)
HCT VFR BLD AUTO: 34.6 % (ref 35–47)
HGB BLD-MCNC: 10.9 G/DL (ref 11.7–15.7)
IMM GRANULOCYTES # BLD: 0 10E3/UL
IMM GRANULOCYTES NFR BLD: 3 %
LYMPHOCYTES # BLD AUTO: 0.3 10E3/UL (ref 0.8–5.3)
LYMPHOCYTES NFR BLD AUTO: 26 %
MCH RBC QN AUTO: 27.9 PG (ref 26.5–33)
MCHC RBC AUTO-ENTMCNC: 31.5 G/DL (ref 31.5–36.5)
MCV RBC AUTO: 89 FL (ref 78–100)
MONOCYTES # BLD AUTO: 0.2 10E3/UL (ref 0–1.3)
MONOCYTES NFR BLD AUTO: 18 %
NEUTROPHILS # BLD AUTO: 0.6 10E3/UL (ref 1.6–8.3)
NEUTROPHILS NFR BLD AUTO: 45 %
NRBC # BLD AUTO: 0 10E3/UL
NRBC BLD AUTO-RTO: 0 /100
PLATELET # BLD AUTO: 187 10E3/UL (ref 150–450)
PROT SERPL-MCNC: 7.5 G/DL (ref 6.4–8.3)
RBC # BLD AUTO: 3.91 10E6/UL (ref 3.8–5.2)
WBC # BLD AUTO: 1.3 10E3/UL (ref 4–11)

## 2024-09-09 PROCEDURE — 86481 TB AG RESPONSE T-CELL SUSP: CPT | Performed by: INTERNAL MEDICINE

## 2024-09-09 PROCEDURE — 250N000011 HC RX IP 250 OP 636: Performed by: INTERNAL MEDICINE

## 2024-09-09 PROCEDURE — 86140 C-REACTIVE PROTEIN: CPT | Performed by: INTERNAL MEDICINE

## 2024-09-09 PROCEDURE — 258N000003 HC RX IP 258 OP 636: Performed by: INTERNAL MEDICINE

## 2024-09-09 PROCEDURE — 36415 COLL VENOUS BLD VENIPUNCTURE: CPT | Performed by: INTERNAL MEDICINE

## 2024-09-09 PROCEDURE — 80076 HEPATIC FUNCTION PANEL: CPT | Performed by: INTERNAL MEDICINE

## 2024-09-09 PROCEDURE — 85025 COMPLETE CBC W/AUTO DIFF WBC: CPT | Performed by: INTERNAL MEDICINE

## 2024-09-09 PROCEDURE — 96413 CHEMO IV INFUSION 1 HR: CPT

## 2024-09-09 RX ORDER — ACETAMINOPHEN 325 MG/1
650 TABLET ORAL ONCE
Start: 2024-09-18 | End: 2024-09-18

## 2024-09-09 RX ORDER — EPINEPHRINE 1 MG/ML
0.3 INJECTION, SOLUTION INTRAMUSCULAR; SUBCUTANEOUS EVERY 5 MIN PRN
OUTPATIENT
Start: 2024-09-18

## 2024-09-09 RX ORDER — ALBUTEROL SULFATE 90 UG/1
1-2 AEROSOL, METERED RESPIRATORY (INHALATION)
Start: 2024-09-18

## 2024-09-09 RX ORDER — DIPHENHYDRAMINE HYDROCHLORIDE 50 MG/ML
50 INJECTION INTRAMUSCULAR; INTRAVENOUS
Start: 2024-09-18

## 2024-09-09 RX ORDER — METHYLPREDNISOLONE SODIUM SUCCINATE 125 MG/2ML
125 INJECTION, POWDER, LYOPHILIZED, FOR SOLUTION INTRAMUSCULAR; INTRAVENOUS
Start: 2024-09-18

## 2024-09-09 RX ORDER — CYCLOSPORINE 100 MG/1
200 CAPSULE, LIQUID FILLED ORAL 2 TIMES DAILY
Qty: 120 CAPSULE | Refills: 11 | Status: SHIPPED | OUTPATIENT
Start: 2024-09-09 | End: 2024-09-12

## 2024-09-09 RX ORDER — MEPERIDINE HYDROCHLORIDE 25 MG/ML
25 INJECTION INTRAMUSCULAR; INTRAVENOUS; SUBCUTANEOUS EVERY 30 MIN PRN
OUTPATIENT
Start: 2024-09-18

## 2024-09-09 RX ORDER — CYCLOSPORINE 25 MG/1
75 CAPSULE, LIQUID FILLED ORAL EVERY 12 HOURS
Qty: 180 CAPSULE | Refills: 3 | Status: SHIPPED | OUTPATIENT
Start: 2024-09-09 | End: 2024-09-12

## 2024-09-09 RX ORDER — DIPHENHYDRAMINE HCL 25 MG
25 CAPSULE ORAL ONCE
Start: 2024-09-18 | End: 2024-09-18

## 2024-09-09 RX ORDER — ALBUTEROL SULFATE 0.83 MG/ML
2.5 SOLUTION RESPIRATORY (INHALATION)
OUTPATIENT
Start: 2024-09-18

## 2024-09-09 RX ADMIN — SODIUM CHLORIDE 250 ML: 9 INJECTION, SOLUTION INTRAVENOUS at 12:53

## 2024-09-09 RX ADMIN — INFLIXIMAB 800 MG: 100 INJECTION, POWDER, LYOPHILIZED, FOR SOLUTION INTRAVENOUS at 12:54

## 2024-09-09 ASSESSMENT — PAIN SCALES - GENERAL: PAINLEVEL: NO PAIN (0)

## 2024-09-09 NOTE — TELEPHONE ENCOUNTER
Received call from Celia at [facility]. Patient recently hospitalized with reported seizure. Celia requesting approval to proceed with infusion. Spoke with Dr. Schilling who gave approval to proceed.

## 2024-09-09 NOTE — PROGRESS NOTES
Infusion Nursing Note:  Abiola Matute presents today for Infliximab.    Patient seen by provider today: No   present during visit today: Not Applicable.    Note: MARII Stearns RNCC via usman Amaya to proceed with treatment today. Dr. Schilling is aware of recent seizure activity with recent hospitalization. See ED notes.       Intravenous Access:  Labs drawn without difficulty.  Peripheral IV placed.    Treatment Conditions:  Biological Infusion Checklist:  ~~~ NOTE: If the patient answers yes to any of the questions below, hold the infusion and contact ordering provider or on-call provider.    Have you recently had an elevated temperature, fever, chills, productive cough, coughing for 3 weeks or longer or hemoptysis,  abnormal vital signs, night sweats,  chest pain or have you noticed a decrease in your appetite, unexplained weight loss or fatigue? No  Do you have any open wounds or new incisions? No  Do you have any upcoming hospitalizations or surgeries? Does not include esophagogastroduodenoscopy, colonoscopy, endoscopic retrograde cholangiopancreatography (ERCP), endoscopic ultrasound (EUS), dental procedures or joint aspiration/steroid injections No  Do you currently have any signs of illness or infection or are you on any antibiotics? No  Have you had any new, sudden or worsening abdominal pain? No  Have you or anyone in your household received a live vaccination in the past 4 weeks? Please note: No live vaccines while on biologic/chemotherapy until 6 months after the last treatment. Patient can receive the flu vaccine (shot only), pneumovax and the Covid vaccine. It is optimal for the patient to get these vaccines mid cycle, but they can be given at any time as long as it is not on the day of the infusion. No  Have you recently been diagnosed with any new nervous system diseases (ie. Multiple sclerosis, Guillain Chauncey, seizures, neurological changes) or cancer diagnosis? Are you on any form  of radiation or chemotherapy? Yes    Are you pregnant or breast feeding or do you have plans of pregnancy in the future? No  Have you been having any signs of worsening depression or suicidal ideations?  (benlysta only) No  Have there been any other new onset medical symptoms? No  Have you had any new blood clots? (IVIG only) No      Post Infusion Assessment:  Patient tolerated infusion without incident.  Blood return noted pre and post infusion.  Site patent and intact, free from redness, edema or discomfort.  No evidence of extravasations.  Access discontinued per protocol.       Discharge Plan:   Discharge instructions reviewed with: Patient.  Patient and/or family verbalized understanding of discharge instructions and all questions answered.  AVS to patient via PodclassT.  Patient will return 11/4/24 for next appointment.   Patient discharged in stable condition accompanied by: self.  Departure Mode: Ambulatory.      Celia Stephens RN

## 2024-09-10 LAB
QUANTIFERON MITOGEN: 0.99 IU/ML
QUANTIFERON NIL TUBE: 0.13 IU/ML
QUANTIFERON TB1 TUBE: 0.13 IU/ML
QUANTIFERON TB2 TUBE: 0.13

## 2024-09-11 ENCOUNTER — TELEPHONE (OUTPATIENT)
Dept: TRANSPLANT | Facility: CLINIC | Age: 59
End: 2024-09-11
Payer: MEDICARE

## 2024-09-11 LAB
GAMMA INTERFERON BACKGROUND BLD IA-ACNC: 0.13 IU/ML
M TB IFN-G BLD-IMP: NEGATIVE
M TB IFN-G CD4+ BCKGRND COR BLD-ACNC: 0.86 IU/ML
MITOGEN IGNF BCKGRD COR BLD-ACNC: 0 IU/ML
MITOGEN IGNF BCKGRD COR BLD-ACNC: 0 IU/ML

## 2024-09-11 NOTE — TELEPHONE ENCOUNTER
Patient Call: General  Route to LPN    Reason for call:  Evelyn from [facility]  called in regards of patient order that was sent via on 8/16/24 and looking to get an update on if it was received. More details with call back.     Call back needed? Yes    Return Call Needed  Same as documented in contacts section  When to return call?: Same day: Route High Priority

## 2024-09-12 ENCOUNTER — LAB REQUISITION (OUTPATIENT)
Dept: LAB | Facility: CLINIC | Age: 59
End: 2024-09-12
Payer: MEDICARE

## 2024-09-12 DIAGNOSIS — Z20.828 CONTACT WITH AND (SUSPECTED) EXPOSURE TO OTHER VIRAL COMMUNICABLE DISEASES: ICD-10-CM

## 2024-09-12 DIAGNOSIS — Z94.4 LIVER TRANSPLANT STATUS (H): ICD-10-CM

## 2024-09-12 DIAGNOSIS — Z94.4 HISTORY OF LIVER TRANSPLANT (H): ICD-10-CM

## 2024-09-12 DIAGNOSIS — Z94.4 S/P LIVER TRANSPLANT (H): ICD-10-CM

## 2024-09-12 DIAGNOSIS — Z48.288 ENCOUNTER FOR AFTERCARE FOLLOWING MULTIPLE ORGAN TRANSPLANT: ICD-10-CM

## 2024-09-12 DIAGNOSIS — Z79.899 OTHER LONG TERM (CURRENT) DRUG THERAPY: ICD-10-CM

## 2024-09-12 LAB
ALBUMIN SERPL BCG-MCNC: 4.2 G/DL (ref 3.5–5.2)
ALP SERPL-CCNC: 172 U/L (ref 40–150)
ALT SERPL W P-5'-P-CCNC: 34 U/L (ref 0–50)
ANION GAP SERPL CALCULATED.3IONS-SCNC: 13 MMOL/L (ref 7–15)
AST SERPL W P-5'-P-CCNC: 32 U/L (ref 0–45)
BILIRUB DIRECT SERPL-MCNC: <0.2 MG/DL (ref 0–0.3)
BILIRUB SERPL-MCNC: 0.7 MG/DL
BUN SERPL-MCNC: 26.5 MG/DL (ref 8–23)
CALCIUM SERPL-MCNC: 9.2 MG/DL (ref 8.8–10.4)
CHLORIDE SERPL-SCNC: 102 MMOL/L (ref 98–107)
CREAT SERPL-MCNC: 0.8 MG/DL (ref 0.51–0.95)
CYCLOSPORINE BLD LC/MS/MS-MCNC: 221 UG/L (ref 50–400)
EGFRCR SERPLBLD CKD-EPI 2021: 84 ML/MIN/1.73M2
ERYTHROCYTE [DISTWIDTH] IN BLOOD BY AUTOMATED COUNT: 14 % (ref 10–15)
GLUCOSE SERPL-MCNC: 92 MG/DL (ref 70–99)
HCO3 SERPL-SCNC: 24 MMOL/L (ref 22–29)
HCT VFR BLD AUTO: 34.4 % (ref 35–47)
HGB BLD-MCNC: 11.2 G/DL (ref 11.7–15.7)
MAGNESIUM SERPL-MCNC: 1.9 MG/DL (ref 1.7–2.3)
MCH RBC QN AUTO: 28.2 PG (ref 26.5–33)
MCHC RBC AUTO-ENTMCNC: 32.6 G/DL (ref 31.5–36.5)
MCV RBC AUTO: 87 FL (ref 78–100)
PHOSPHATE SERPL-MCNC: 4.7 MG/DL (ref 2.5–4.5)
PLATELET # BLD AUTO: 226 10E3/UL (ref 150–450)
POTASSIUM SERPL-SCNC: 4.3 MMOL/L (ref 3.4–5.3)
PROT SERPL-MCNC: 6.9 G/DL (ref 6.4–8.3)
RBC # BLD AUTO: 3.97 10E6/UL (ref 3.8–5.2)
SODIUM SERPL-SCNC: 139 MMOL/L (ref 135–145)
TME LAST DOSE: NORMAL H
TME LAST DOSE: NORMAL H
WBC # BLD AUTO: 1.6 10E3/UL (ref 4–11)

## 2024-09-12 PROCEDURE — 84100 ASSAY OF PHOSPHORUS: CPT | Mod: ORL | Performed by: TRANSPLANT SURGERY

## 2024-09-12 PROCEDURE — 82248 BILIRUBIN DIRECT: CPT | Mod: ORL | Performed by: TRANSPLANT SURGERY

## 2024-09-12 PROCEDURE — 80158 DRUG ASSAY CYCLOSPORINE: CPT | Mod: ORL | Performed by: TRANSPLANT SURGERY

## 2024-09-12 PROCEDURE — 83735 ASSAY OF MAGNESIUM: CPT | Mod: ORL | Performed by: TRANSPLANT SURGERY

## 2024-09-12 PROCEDURE — 85027 COMPLETE CBC AUTOMATED: CPT | Mod: ORL | Performed by: TRANSPLANT SURGERY

## 2024-09-12 RX ORDER — CYCLOSPORINE 25 MG/1
50 CAPSULE, LIQUID FILLED ORAL EVERY 12 HOURS
Qty: 120 CAPSULE | Refills: 11 | Status: SHIPPED | OUTPATIENT
Start: 2024-09-12 | End: 2024-09-16

## 2024-09-12 RX ORDER — CYCLOSPORINE 100 MG/1
200 CAPSULE, LIQUID FILLED ORAL 2 TIMES DAILY
Qty: 120 CAPSULE | Refills: 11 | Status: SHIPPED | OUTPATIENT
Start: 2024-09-12 | End: 2024-09-16

## 2024-09-12 NOTE — TELEPHONE ENCOUNTER
ISSUE:   Cyclosporine level 224 on 9/11, goal 200, dose 175 mg BID.    PLAN:   Call Patient and confirm this was an accurate 12-hour trough.   Verify Cyclosporine dose 175 mg BID.   Confirm no new medications or or missed doses.   Confirm no new illness / infection / diarrhea.   If accurate trough and accurate dose, decrease Cyclosporine dose to 150 mg BID     Is this more than a 50% increase or decrease in current IS dose: No  If YES, justification:     Repeat labs in 1 weeks.  *If > 50% change in immunosuppression dose, repeat labs in 1 week.   Zayra Paulino RN      OUTCOME:   Spoke with Albino,  they confirm accurate trough level and current dose 175 mg BID.   Albino confirmed dose change to 150 mg BID.  Patient agreed to repeat labs in 1 weeks.   Orders sent to preferred pharmacy for dose change and lab for repeat labs.   Albino voiced understanding of plan.     Michelle Starks LPN

## 2024-09-13 ENCOUNTER — MYC MEDICAL ADVICE (OUTPATIENT)
Dept: TRANSPLANT | Facility: CLINIC | Age: 59
End: 2024-09-13
Payer: MEDICARE

## 2024-09-16 DIAGNOSIS — K50.119 CROHN'S DISEASE OF PERIANAL REGION WITH COMPLICATION (H): Primary | ICD-10-CM

## 2024-09-16 DIAGNOSIS — Z94.4 S/P LIVER TRANSPLANT (H): ICD-10-CM

## 2024-09-16 DIAGNOSIS — Z94.4 HISTORY OF LIVER TRANSPLANT (H): ICD-10-CM

## 2024-09-16 RX ORDER — CYCLOSPORINE 100 MG/1
100 CAPSULE, LIQUID FILLED ORAL 2 TIMES DAILY
Qty: 60 CAPSULE | Refills: 11 | Status: SHIPPED | OUTPATIENT
Start: 2024-09-16

## 2024-09-16 RX ORDER — CYCLOSPORINE 25 MG/1
50 CAPSULE, LIQUID FILLED ORAL EVERY 12 HOURS
Qty: 120 CAPSULE | Refills: 11 | Status: SHIPPED | OUTPATIENT
Start: 2024-09-16

## 2024-09-16 NOTE — PROGRESS NOTES
Per Dr. Schilling plan for Infliximab level to be drawn prior to infusion on 11/4/2024 at [facility]. Order placed. InZumobi message sent to clinic support pool for prometheus form..

## 2024-09-17 ENCOUNTER — DOCUMENTATION ONLY (OUTPATIENT)
Dept: GASTROENTEROLOGY | Facility: CLINIC | Age: 59
End: 2024-09-17
Payer: MEDICARE

## 2024-09-17 DIAGNOSIS — G40.109 LOCALIZATION-RELATED FOCAL EPILEPSY WITH SIMPLE PARTIAL SEIZURES (H): ICD-10-CM

## 2024-09-17 NOTE — PROGRESS NOTES
Prometheus form filled out and scanned into patient chart. Lab order is in. Added to patient list. Copy also sent via fax to [phone].    Pricilla Stearns RN Lacher, Stacy, LPN  Can you please fill out and scan into the patients chart a prometheus lab order form for this patient?    Medication:  infliximab    Dose: 10 mg/kg  Frequency: every 8 weeks  Provider: Dr. Schilling  When is lab due: 11/4/2024  Does the patient need a lab appointment: no, patient infuses at [facility]      Renata Rawls LPN

## 2024-09-17 NOTE — LETTER
9/20/2023         RE: Abiola Matute  [address] [address]        Dear Colleague,    Thank you for referring your patient, Abiola Matute, to the [facility]. Please see a copy of my visit note below.    Ms. Matute looks good.  She had a laparoscopic microwave ablation of hepatocellular cancer after an incomplete response to Y90.  She had more pain after surgery than she was expecting and there was some complicating issues with getting her prescription filled because it got late into the evening by the time she recovered from surgery.  She shared her feedback that this was frustrating for her.  Ultimately she did receive a paper prescription which she was able to fill at [facility].  Since surgery she has been recovering well.  Her pain is improving.  She does have some pain with coughing occasionally and also has some feeling of bloating.  MRI shows no evidence of residual tumor.  Only minimal ascites.    I discussed that the scan looks good.  Her ablation looks good with no evidence of residual tumor.  I discussed that she would continue her ongoing follow-up with Dr. Cervantes.  The patient also noted that she was under the impression that ablation would cure her of her hepatocellular cancer and that she would not ever need a liver transplant.  I did clarify for her that while ablation is very good at treating the tumor that we burned she is certainly at risk for recurrent tumors elsewhere in her liver which could lead to the need for transplant in the future.  We have certainly had that discussion in the past but there was clearly a miscommunication. I discussed that it is possible that she will never need a transplant, but it is certainly a potential in the future. She and her  voiced understanding, and I did my best to answer all of their quesstions and concerns.    From a clinical standpoint patient is doing well.  I encouraged her  that she will continue to improve back to her normal baseline.  Her surgical incisions have healed without difficulty.  She will continue follow-up with Dr. Cervantes and I will see her on an as-needed basis moving forward. They were happy with that plan.      Again, thank you for allowing me to participate in the care of your patient.        Sincerely,        Albino Saavedra MD   no...

## 2024-09-17 NOTE — TELEPHONE ENCOUNTER
Pt is requesting new rx for    Nayzilam 5mg/0.1ml soln- could not transfer from [facility]    Did not see on active med list please verify and send new rx. Thank you!     [facility]/mail pharmacy  [phone]

## 2024-09-19 ENCOUNTER — LAB REQUISITION (OUTPATIENT)
Dept: LAB | Facility: CLINIC | Age: 59
End: 2024-09-19
Payer: MEDICARE

## 2024-09-19 ENCOUNTER — TELEPHONE (OUTPATIENT)
Dept: NEUROLOGY | Facility: CLINIC | Age: 59
End: 2024-09-19

## 2024-09-19 DIAGNOSIS — Z48.288 ENCOUNTER FOR AFTERCARE FOLLOWING MULTIPLE ORGAN TRANSPLANT: ICD-10-CM

## 2024-09-19 DIAGNOSIS — Z94.4 S/P LIVER TRANSPLANT (H): ICD-10-CM

## 2024-09-19 DIAGNOSIS — Z94.4 LIVER TRANSPLANT STATUS (H): ICD-10-CM

## 2024-09-19 DIAGNOSIS — Z79.899 OTHER LONG TERM (CURRENT) DRUG THERAPY: ICD-10-CM

## 2024-09-19 DIAGNOSIS — Z20.828 CONTACT WITH AND (SUSPECTED) EXPOSURE TO OTHER VIRAL COMMUNICABLE DISEASES: ICD-10-CM

## 2024-09-19 LAB
ALBUMIN SERPL BCG-MCNC: 4.2 G/DL (ref 3.5–5.2)
ALP SERPL-CCNC: 157 U/L (ref 40–150)
ALT SERPL W P-5'-P-CCNC: 27 U/L (ref 0–50)
ANION GAP SERPL CALCULATED.3IONS-SCNC: 12 MMOL/L (ref 7–15)
AST SERPL W P-5'-P-CCNC: 23 U/L (ref 0–45)
BILIRUB DIRECT SERPL-MCNC: <0.2 MG/DL (ref 0–0.3)
BILIRUB SERPL-MCNC: 0.5 MG/DL
BUN SERPL-MCNC: 33.4 MG/DL (ref 8–23)
CALCIUM SERPL-MCNC: 9.5 MG/DL (ref 8.8–10.4)
CHLORIDE SERPL-SCNC: 101 MMOL/L (ref 98–107)
CREAT SERPL-MCNC: 0.81 MG/DL (ref 0.51–0.95)
CYCLOSPORINE BLD LC/MS/MS-MCNC: 160 UG/L (ref 50–400)
EGFRCR SERPLBLD CKD-EPI 2021: 83 ML/MIN/1.73M2
ERYTHROCYTE [DISTWIDTH] IN BLOOD BY AUTOMATED COUNT: 14.2 % (ref 10–15)
GLUCOSE SERPL-MCNC: 94 MG/DL (ref 70–99)
HCO3 SERPL-SCNC: 24 MMOL/L (ref 22–29)
HCT VFR BLD AUTO: 33.7 % (ref 35–47)
HGB BLD-MCNC: 10.8 G/DL (ref 11.7–15.7)
MAGNESIUM SERPL-MCNC: 2 MG/DL (ref 1.7–2.3)
MCH RBC QN AUTO: 27.9 PG (ref 26.5–33)
MCHC RBC AUTO-ENTMCNC: 32 G/DL (ref 31.5–36.5)
MCV RBC AUTO: 87 FL (ref 78–100)
PHOSPHATE SERPL-MCNC: 5 MG/DL (ref 2.5–4.5)
PLATELET # BLD AUTO: 243 10E3/UL (ref 150–450)
POTASSIUM SERPL-SCNC: 4.1 MMOL/L (ref 3.4–5.3)
PROT SERPL-MCNC: 7.2 G/DL (ref 6.4–8.3)
RBC # BLD AUTO: 3.87 10E6/UL (ref 3.8–5.2)
SODIUM SERPL-SCNC: 137 MMOL/L (ref 135–145)
TME LAST DOSE: NORMAL H
TME LAST DOSE: NORMAL H
WBC # BLD AUTO: 2 10E3/UL (ref 4–11)

## 2024-09-19 PROCEDURE — 80053 COMPREHEN METABOLIC PANEL: CPT | Mod: ORL | Performed by: TRANSPLANT SURGERY

## 2024-09-19 PROCEDURE — 80158 DRUG ASSAY CYCLOSPORINE: CPT | Mod: ORL | Performed by: TRANSPLANT SURGERY

## 2024-09-19 PROCEDURE — 84100 ASSAY OF PHOSPHORUS: CPT | Mod: ORL | Performed by: TRANSPLANT SURGERY

## 2024-09-19 PROCEDURE — 85027 COMPLETE CBC AUTOMATED: CPT | Mod: ORL | Performed by: TRANSPLANT SURGERY

## 2024-09-19 PROCEDURE — 82248 BILIRUBIN DIRECT: CPT | Mod: ORL | Performed by: TRANSPLANT SURGERY

## 2024-09-19 PROCEDURE — 83735 ASSAY OF MAGNESIUM: CPT | Mod: ORL | Performed by: TRANSPLANT SURGERY

## 2024-09-19 NOTE — TELEPHONE ENCOUNTER
Pt is requesting new rx for     Nayzilam 5mg/0.1ml soln- could not transfer from [facility]     Did not see on active med list please verify and send new rx. Thank you!     [facility]  [phone]

## 2024-09-20 DIAGNOSIS — Z94.4 S/P LIVER TRANSPLANT (H): ICD-10-CM

## 2024-09-20 DIAGNOSIS — G40.109 LOCALIZATION-RELATED FOCAL EPILEPSY WITH SIMPLE PARTIAL SEIZURES (H): ICD-10-CM

## 2024-09-20 RX ORDER — MYCOPHENOLATE MOFETIL 250 MG/1
750 CAPSULE ORAL 2 TIMES DAILY
Qty: 180 CAPSULE | Refills: 1 | Status: SHIPPED | OUTPATIENT
Start: 2024-09-20 | End: 2024-09-23

## 2024-09-20 NOTE — TELEPHONE ENCOUNTER
Tova calls for a refill request for mycophenolate. Evidently she had faxed a refill request but none received. Writer sent for script.

## 2024-09-23 ENCOUNTER — ANCILLARY PROCEDURE (OUTPATIENT)
Dept: GENERAL RADIOLOGY | Facility: CLINIC | Age: 59
End: 2024-09-23
Attending: TRANSPLANT SURGERY
Payer: MEDICARE

## 2024-09-23 ENCOUNTER — LAB (OUTPATIENT)
Dept: LAB | Facility: CLINIC | Age: 59
End: 2024-09-23
Attending: TRANSPLANT SURGERY
Payer: MEDICARE

## 2024-09-23 ENCOUNTER — OFFICE VISIT (OUTPATIENT)
Dept: TRANSPLANT | Facility: CLINIC | Age: 59
End: 2024-09-23
Attending: TRANSPLANT SURGERY
Payer: MEDICARE

## 2024-09-23 VITALS
TEMPERATURE: 97.7 F | OXYGEN SATURATION: 99 % | WEIGHT: 182 LBS | BODY MASS INDEX: 30.29 KG/M2 | DIASTOLIC BLOOD PRESSURE: 94 MMHG | RESPIRATION RATE: 16 BRPM | SYSTOLIC BLOOD PRESSURE: 162 MMHG | HEART RATE: 72 BPM

## 2024-09-23 DIAGNOSIS — K50.119 CROHN'S DISEASE OF PERIANAL REGION WITH COMPLICATION (H): ICD-10-CM

## 2024-09-23 DIAGNOSIS — Z94.4 S/P LIVER TRANSPLANT (H): ICD-10-CM

## 2024-09-23 DIAGNOSIS — F10.11 ALCOHOL ABUSE, IN REMISSION: ICD-10-CM

## 2024-09-23 DIAGNOSIS — K70.31 ALCOHOLIC CIRRHOSIS OF LIVER WITH ASCITES (H): ICD-10-CM

## 2024-09-23 DIAGNOSIS — Z94.4 LIVER REPLACED BY TRANSPLANT (H): ICD-10-CM

## 2024-09-23 DIAGNOSIS — Z94.4 S/P LIVER TRANSPLANT (H): Primary | ICD-10-CM

## 2024-09-23 DIAGNOSIS — C22.0 HCC (HEPATOCELLULAR CARCINOMA) (H): ICD-10-CM

## 2024-09-23 LAB
AFP SERPL-MCNC: <1.8 NG/ML
ALBUMIN SERPL BCG-MCNC: 4.3 G/DL (ref 3.5–5.2)
ALP SERPL-CCNC: 158 U/L (ref 40–150)
ALT SERPL W P-5'-P-CCNC: 31 U/L (ref 0–50)
ANION GAP SERPL CALCULATED.3IONS-SCNC: 9 MMOL/L (ref 7–15)
AST SERPL W P-5'-P-CCNC: 31 U/L (ref 0–45)
BILIRUB DIRECT SERPL-MCNC: 0.2 MG/DL (ref 0–0.3)
BILIRUB SERPL-MCNC: 0.8 MG/DL
BUN SERPL-MCNC: 22.7 MG/DL (ref 8–23)
CALCIUM SERPL-MCNC: 9.8 MG/DL (ref 8.8–10.4)
CHLORIDE SERPL-SCNC: 103 MMOL/L (ref 98–107)
CREAT SERPL-MCNC: 0.84 MG/DL (ref 0.51–0.95)
CYCLOSPORINE BLD LC/MS/MS-MCNC: 149 UG/L (ref 50–400)
EGFRCR SERPLBLD CKD-EPI 2021: 79 ML/MIN/1.73M2
ERYTHROCYTE [DISTWIDTH] IN BLOOD BY AUTOMATED COUNT: 14.5 % (ref 10–15)
GLUCOSE SERPL-MCNC: 94 MG/DL (ref 70–99)
HCO3 SERPL-SCNC: 27 MMOL/L (ref 22–29)
HCT VFR BLD AUTO: 38.4 % (ref 35–47)
HGB BLD-MCNC: 12.1 G/DL (ref 11.7–15.7)
MAGNESIUM SERPL-MCNC: 1.9 MG/DL (ref 1.7–2.3)
MCH RBC QN AUTO: 27.4 PG (ref 26.5–33)
MCHC RBC AUTO-ENTMCNC: 31.5 G/DL (ref 31.5–36.5)
MCV RBC AUTO: 87 FL (ref 78–100)
PHOSPHATE SERPL-MCNC: 4.1 MG/DL (ref 2.5–4.5)
PLATELET # BLD AUTO: 218 10E3/UL (ref 150–450)
POTASSIUM SERPL-SCNC: 4.2 MMOL/L (ref 3.4–5.3)
PROT SERPL-MCNC: 7.5 G/DL (ref 6.4–8.3)
RBC # BLD AUTO: 4.41 10E6/UL (ref 3.8–5.2)
SODIUM SERPL-SCNC: 139 MMOL/L (ref 135–145)
TME LAST DOSE: NORMAL H
TME LAST DOSE: NORMAL H
WBC # BLD AUTO: 2.2 10E3/UL (ref 4–11)

## 2024-09-23 PROCEDURE — 74019 RADEX ABDOMEN 2 VIEWS: CPT | Performed by: RADIOLOGY

## 2024-09-23 PROCEDURE — 99214 OFFICE O/P EST MOD 30 MIN: CPT | Performed by: TRANSPLANT SURGERY

## 2024-09-23 PROCEDURE — 86481 TB AG RESPONSE T-CELL SUSP: CPT | Performed by: INTERNAL MEDICINE

## 2024-09-23 PROCEDURE — 82105 ALPHA-FETOPROTEIN SERUM: CPT | Performed by: TRANSPLANT SURGERY

## 2024-09-23 PROCEDURE — 80158 DRUG ASSAY CYCLOSPORINE: CPT | Performed by: TRANSPLANT SURGERY

## 2024-09-23 PROCEDURE — G0463 HOSPITAL OUTPT CLINIC VISIT: HCPCS | Performed by: TRANSPLANT SURGERY

## 2024-09-23 RX ORDER — MYCOPHENOLATE MOFETIL 250 MG/1
750 CAPSULE ORAL 2 TIMES DAILY
Qty: 180 CAPSULE | Refills: 1 | Status: SHIPPED | OUTPATIENT
Start: 2024-09-23

## 2024-09-23 NOTE — PROGRESS NOTES
Transplant Surgery -OUTPATIENT IMMUNOSUPPRESSION PROGRESS NOTE    Date of Visit: 09/23/2024    Transplants:  6/22/2024 (Liver); Postoperative day:  93  ASSESMENT AND PLAN:  1.Graft Function:Liver allograft: no rejection or technical problems.    2.Immunosuppression Management:keep cyclosporine levels at 200 ng/dL and cellcept for a total of 6 months, wean prednisone, 2.5 mg daily for a week and stop  3.Hypertension:ok  4.Renal Function:good  5.Lab frequency:weekly  6.Other:  Has hair loss if it continues, may refer to dermatology    Date: September 23, 2024    Transplant:  [x]                             Liver [x]                              Kidney []                             Pancreas []                              Other:             Chief Complaint:  Doing well and losing hair    History of Present Illness:  Patient Active Problem List   Diagnosis    Non morbid obesity due to excess calories    Other isolated or specific phobias    Other congenital malformations of mouth    Contact dermatitis and other eczema, due to unspecified cause    Intestinal infection due to Clostridium difficile    Iron deficiency anemia, unspecified iron deficiency anemia type    Rosacea    Atypical ductal hyperplasia of left breast    Idiopathic thrombocytopenia (H)    Postmenopausal- s/p hysterectomy with BSO - not on HRT secondary to atypical ductal hyperplasia & breast pain -no paps needed    Claustrophobia    S/P total hysterectomy and bilateral salpingo-oophorectomy    Abnormal liver enzymes- ? related to ongoing etoh use/abuse    Essential hypertension with goal blood pressure less than 140/90    Gastroenteritis    Stool incontinence    CARDIOVASCULAR SCREENING; LDL GOAL LESS THAN 130    AA (alcohol abuse) - ? ongoing     Alcoholic fatty liver    Acquired hyperbilirubinemia- ? secondary to alcohol use    Diffuse nodular cirrhosis of liver (H)    Severe Perianal Crohn's Disease    Biliary colic    Cholecystitis    Alcoholic  cirrhosis of liver with ascites (H) - seeing [facility]     Abscess of anal or rectal region    Anal fistula    HCC (hepatocellular carcinoma) (H)    Alcohol use disorder, moderate, dependence (H)    Crohn's disease of large intestine with fistula (H)    Ascending aorta dilation (H24)    Hyponatremia    Liver cirrhosis (H)    S/P liver transplant (H)    Immunosuppressed status (H24)    Acute post-operative pain    Insomnia    Anemia due to blood loss, acute    Moderate malnutrition (H24)    RADHA (acute kidney injury) (H24)    Hypomagnesemia    Liver replaced by transplant (H)    Fever, unspecified fever cause    Pleural effusion on right    Pneumonia of right lung due to infectious organism, unspecified part of lung    Xerosis cutis    Symmetrical drug-related intertriginous and flexural exanthema    Hypervolemia    Hypocalcemia    Seizure (H)    Seizures (H)     SOCIAL /FAMILY HISTORY: [x]                  No recent change    Past Medical History:   Diagnosis Date    Alcohol abuse     Alcoholic cirrhosis of liver with ascites     Anal fistula     Atypical ductal hyperplasia of breast 09/10/2010    ERT not recommended -left - and flat epithelial atypia-scheduled for breast biopsy 9/17/2010     Bifid uvula     Cholelithiasis     Contact perianal dermatitis and other eczema     recurrent - clobetasol     Crohn disease     Fear of flying      - gets Ativan prn.     HCC (hepatocellular carcinoma) (H) 02/01/2023    Hypertension     IBS (irritable bowel syndrome)     Seizure disorder (H) 07/09/2024    Status post liver transplantation (H) 06/22/2024    Symmetrical drug-related intertriginous and flexural exanthema 06/30/2024    Secondary to dapsone     Past Surgical History:   Procedure Laterality Date    BENCH LIVER  6/22/2024    Procedure: Bench liver;  Surgeon: Pravin Ball MD;  Location: [facility] OR    BIOPSY ANAL N/A 3/28/2019    anal biopsy and culure placement of cathleen - Dr Fleming    BIOPSY BREAST Left 09/17/2010    -  scheduled with Dr. Varma     BIOPSY LIVER  2019    COLONOSCOPY  2006    COLONOSCOPY N/A 12/23/2014    Procedure: COMBINED COLONOSCOPY, SINGLE OR MULTIPLE BIOPSY/POLYPECTOMY BY BIOPSY;  Surgeon: Diane Fleming MD;  Location:  GI    COLONOSCOPY N/A 12/5/2019    Procedure: COLONOSCOPY, WITH POLYPECTOMY AND BIOPSY;  Surgeon: Farhan Schilling MD;  Location: [facility]    COLONOSCOPY N/A 2/27/2024    Procedure: COLONOSCOPY, WITH POLYPECTOMY AND BIOPSY;  Surgeon: Michelle Diaz MD;  Location: [facility] OR    ENDOSCOPIC RETROGRADE CHOLANGIOPANCREATOGRAM N/A 4/24/2020    Procedure: ENDOSCOPIC RETROGRADE CHOLANGIOPANCREATOGRAPHY WITH, sledge removal,sphincterotomy, stent in gallbladder and pancreatic duct stent, and balloon dilation;  Surgeon: Guru Isac Kraft MD;  Location: [facility]    ESOPHAGOSCOPY, GASTROSCOPY, DUODENOSCOPY (EGD), COMBINED N/A 12/5/2019    Procedure: ESOPHAGOGASTRODUODENOSCOPY (EGD);  Surgeon: Farhan Schilling MD;  Location: [facility]    ESOPHAGOSCOPY, GASTROSCOPY, DUODENOSCOPY (EGD), COMBINED N/A 5/11/2023    Procedure: Esophagoscopy, gastroscopy, duodenoscopy (EGD), combined;  Surgeon: Jeannette Cervantes MD;  Location: [facility]    EXAM UNDER ANESTHESIA ANUS N/A 3/28/2019    Procedure: EXAM UNDER ANESTHESIA ANUS;  Surgeon: Diane Fleming MD;  Location:  OR    EXAM UNDER ANESTHESIA ANUS N/A 2/26/2021    Procedure: EXAM UNDER ANESTHESIA OF ANUS, SETON PLACEMENT, EXCISION OF SKIN BRIDGE;  Surgeon: Avtar Nam MD;  Location: [facility] OR    EXAM UNDER ANESTHESIA ANUS N/A 1/6/2023    Procedure: EXAM UNDER ANESTHESIA, ANUS, SETON EXCHANGE, partial fistulotomy;  Surgeon: Mary Dugan MD;  Location: [facility] OR    HYSTERECTOMY, VAGINAL  2006    with Dr. Licha Zhou - with BSO for fibroids     IR GALLBLADDER DRAIN PLACEMENT  4/22/2020    IR SIRT (SELECTIVE INTERNAL RADIO THERAPY)  2/21/2023    IR VISCERAL ANGIOGRAM  2/21/2023    IR VISCERAL EMBOLIZATION   2023    LAPAROSCOPIC ABLATION LIVER TUMOR N/A 2023    Procedure: Diagnostic Laparoscopy, Laparoscopic microwave ablation of liver tumor intraoperative ultrasound of liver, lysis of adhesions 1 hour,;  Surgeon: Albino Saavedra MD;  Location: [facility] OR    OPEN REDUCTION INTERNAL FIXATION ANKLE Left at age 27    plates and screws removed at age 36    Pelviscopy with removal of bilateral hydrosalpinges.  04/15/2010    TRANSPLANT LIVER RECIPIENT  DONOR N/A 2024    Procedure: Transplant liver recipient  donor, with biliary stent placement;  Surgeon: Pravin Ball MD;  Location: [facility] OR    ZZC APPENDECTOMY  at age 14     Social History     Socioeconomic History    Marital status:      Spouse name: Albino    Number of children: 3    Years of education: 14    Highest education level: Not on file   Occupational History    Occupation: unemployed   Tobacco Use    Smoking status: Never     Passive exposure: Never    Smokeless tobacco: Never   Vaping Use    Vaping status: Never Used   Substance and Sexual Activity    Alcohol use: Not Currently    Drug use: No    Sexual activity: Yes     Partners: Male     Birth control/protection: Post-menopausal     Comment: husb had vasectomy   Other Topics Concern     Service No    Blood Transfusions No    Caffeine Concern No     Comment: rarely drinks caffeine    Occupational Exposure Not Asked    Hobby Hazards Not Asked    Sleep Concern Not Asked    Stress Concern Not Asked    Weight Concern Not Asked    Special Diet Not Asked    Back Care Not Asked    Exercise Yes     Comment: does a lot of walking - 4x/week    Bike Helmet Not Asked    Seat Belt Yes     Comment: always    Self-Exams Yes     Comment: SBE encouraged monthly    Parent/sibling w/ CABG, MI or angioplasty before 65F 55M? Yes   Social History Narrative    calcium - drinks 5-6 large glasses skim milk/day    flex sig/colonoscopy -at age 49    sun precautions - discussed    mammogram -  needs every 2 years in her 30's, then yearly from then on    Td booster - 9/99 and 4/27/2010    pneumovax -at age 59    DEXA -when perimenopausal    stool hemoccults - every year after age 40    ASA- start at age 39    mulvitamin - encouraged     Social Determinants of Health     Financial Resource Strain: Low Risk  (2/4/2024)    Financial Resource Strain     Within the past 12 months, have you or your family members you live with been unable to get utilities (heat, electricity) when it was really needed?: No   Food Insecurity: Low Risk  (2/4/2024)    Food Insecurity     Within the past 12 months, did you worry that your food would run out before you got money to buy more?: No     Within the past 12 months, did the food you bought just not last and you didn t have money to get more?: No   Transportation Needs: Low Risk  (2/4/2024)    Transportation Needs     Within the past 12 months, has lack of transportation kept you from medical appointments, getting your medicines, non-medical meetings or appointments, work, or from getting things that you need?: No   Physical Activity: Unknown (9/15/2020)    Exercise Vital Sign     Days of Exercise per Week: 0 days     Minutes of Exercise per Session: Not on file   Stress: Stress Concern Present (9/15/2020)    [place] [employer] - Occupational Stress Questionnaire     Feeling of Stress : Rather much   Social Connections: Unknown (9/15/2020)    Social Connection and Isolation Panel [NHANES]     Frequency of Communication with Friends and Family: More than three times a week     Frequency of Social Gatherings with Friends and Family: More than three times a week     Attends Restorationist Services: Not on file     Active Member of Clubs or Organizations: Not on file     Attends Club or Organization Meetings: Not on file     Marital Status: Not on file   Interpersonal Safety: Low Risk  (2/7/2024)    Interpersonal Safety     Do you feel physically and emotionally  safe where you currently live?: Yes     Within the past 12 months, have you been hit, slapped, kicked or otherwise physically hurt by someone?: No     Within the past 12 months, have you been humiliated or emotionally abused in other ways by your partner or ex-partner?: No   Housing Stability: Low Risk  (2/4/2024)    Housing Stability     Do you have housing? : Yes     Are you worried about losing your housing?: No     Prescription Medications as of 9/23/2024         Rx Number Disp Refills Start End Last Dispensed Date Next Fill Date Owning Pharmacy    acetaminophen (TYLENOL) 325 MG tablet  60 tablet 0 6/28/2024 --   [address]    Sig: Take 2 tablets (650 mg) by mouth every 6 hours as needed for mild pain    Class: E-Prescribe    Route: Oral    albuterol (PROVENTIL) (2.5 MG/3ML) 0.083% neb solution  90 mL 5 8/2/2024 --   [address]    Sig: Take 1 vial (2.5 mg) by nebulization every 28 days    Class: E-Prescribe    Route: Nebulization    amoxicillin (AMOXIL) 500 MG capsule  4 capsule 0 8/20/2024 --   [id number] [place] [address] AT [address]    Sig: Take 4 capsules (2,000 mg) by mouth once as needed (30-60 minutes before dental procedure)    Class: E-Prescribe    Route: Oral    aspirin (ASA) 81 MG chewable tablet  30 tablet 11 7/17/2024 --   [facility] [address]    Sig: Take 1 tablet (81 mg) by mouth or Feeding Tube daily    Class: E-Prescribe    Route: Oral or Feeding Tube    cycloSPORINE modified (GENERIC EQUIVALENT) 100 MG capsule  60 capsule 11 9/16/2024 --   [facility] [facility]    Sig: Take 1 capsule (100 mg) by mouth 2 times daily. Total dose 150mg BID    Class: E-Prescribe    Route: Oral    cycloSPORINE modified (GENERIC EQUIVALENT) 25 MG  capsule  120 capsule 11 9/16/2024 --   [facility] [place] [address] [address]    Sig: Take 2 capsules (50 mg) by mouth every 12 hours. Total dose 150mg BID    Class: E-Prescribe    Route: Oral    diazePAM, 15 MG Dose, (VALTOCO 15 MG DOSE) 2 x 7.5 MG/0.1ML LQPK  2 each 2 8/26/2024 --   [facility] [place] [address] [address]    Sig: [place] 7.5 mg in nostril as needed (To be used for GTC greater than 3 to 4 minutes, focal seizures [smaller seizures] greater than 10 minutes, 3 or more GTC or focal seizures within 24 hours).    Class: E-Prescribe    Route: Nasal    diphenhydrAMINE (BENADRYL) 50 MG capsule  -- --  --       Sig: Take 50 mg by mouth nightly as needed for sleep    Class: Historical    Route: Oral    emollient (VANICREAM) external cream  453 g 1 7/6/2024 --       Sig: Apply topically every 6 hours as needed for other (Dryness)    Class: No Print Out    Route: Topical    levETIRAcetam (KEPPRA) 1000 MG tablet  180 tablet 3 9/3/2024 --   [facility] - [place], [place] - [address] [place] [address]    Sig: Take 1 tablet (1,000 mg) by mouth 3 times daily.    Class: E-Prescribe    Route: Oral    magnesium oxide (MAG-OX) 400 MG tablet  120 tablet 11 7/17/2024 --   [facility] [place] [address] [address]    Sig: Take 2 tablets (800 mg) by mouth 2 times daily    Class: E-Prescribe    Route: Oral    midazolam 5 mg/mL (VERSED) 5 mg/mL intranasal solution  2 mL 2 9/20/2024 --   [facility] [place] [address] [address]    Sig: Apply 1 mL (5 mg) into one nostril as directed once as needed for seizures (1 squirt in JUST ONE nostril.  To be used for GTC greater than 3 to 4 minutes, focal seizures [smaller seizures] greater than 10 minutes, 3 or more GTC or focal seizures within 24 hours).    Class: E-Prescribe    Route: Intranasal    mycophenolate (GENERIC EQUIVALENT) 250 MG capsule  180  capsule 1 9/20/2024 --   [facility] [place], [place] - [facility] [address]    Sig: Take 3 capsules (750 mg) by mouth 2 times daily.    Class: E-Prescribe    Notes to Pharmacy: TXP DT 6/22/2024 (Liver) TXP Dischg DT 6/28/2024 DX Liver replaced by transplant Z94.4 TX [facility] ([place], [place])    Route: Oral    pantoprazole (PROTONIX) 40 MG EC tablet  30 tablet 11 7/17/2024 --   [facility] [place] [address] [address]    Sig: Take 1 tablet (40 mg) by mouth daily    Class: E-Prescribe    Route: Oral    pentamidine (NEBUPENT) 300 MG neb solution  -- -- 8/2/2024 --       Sig: Inhale 300 mg into the lungs every 28 days    Class: No Print Out    Route: Inhalation    polyethylene glycol (MIRALAX) 17 GM/Dose powder  510 g 0 6/28/2024 --   [facility] [place], [place] - [facility] [address]    Sig: Take 17 g by mouth 2 times daily as needed for constipation    Class: E-Prescribe    Route: Oral    predniSONE (DELTASONE) 5 MG tablet  30 tablet 2 7/17/2024 --   [facility]/[place] [address] [address]    Sig: Take 1 tablet (5 mg) by mouth daily    Class: E-Prescribe    Notes to Pharmacy: TXP DT 6/22/2024 (Liver) TXP Dischg DT 6/28/2024 DX Liver replaced by transplant Z94.4 [facility] ([place], [place])    Route: Oral    psyllium (METAMUCIL) WAFR  60 Wafer 2 7/7/2024 --   [facility] [place], [place] - [address]    Sig: Take 2 Wafers by mouth daily    Class: E-Prescribe    Route: Oral    traZODone (DESYREL) 50 MG tablet  30 tablet 2 7/30/2024 --   [facility] [address] [address]    Sig: Take 0.5-1 tablets (25-50 mg) by mouth nightly as needed for sleep    Class: E-Prescribe    Route: Oral    ursodiol (ACTIGALL) 300 MG capsule  90 capsule 11 7/17/2024 --   [facility] - [place], [place] - [address]    Sig: Take 1 capsule (300 mg) by mouth or Feeding Tube 3 times daily    Class: E-Prescribe    Route: Oral or Feeding Tube    valGANciclovir (VALCYTE) 450 MG tablet  90 tablet 1 7/19/2024 --   [facility] - [place], [place] - [address]    Sig: Take 1 tablet (450 mg) by mouth daily    Class: E-Prescribe    Route: Oral          Clinic-Administered Medications as of 9/23/2024         Dose Frequency Start End    hyoscyamine (LEVSIN/SL) sublingual tablet 125 mcg 125 mcg EVERY 4 HOURS PRN 6/16/2022 --    Admin Instructions: Patients using antacids should take hyoscyamine before meals and the antacid after meals.    Route: Sublingual          Blood transfusion related (informational only), Dapsone, Fish oil, Metronidazole, Ppd [tuberculin purified protein derivative], Sulfa antibiotics, and Terbinafine and related   REVIEW OF SYSTEMS (check box if normal)  [x]               GENERAL  [x]                 PULMONARY [x]                GENITOURINARY  [x]                CNS                 [x]                 CARDIAC  [x]                 ENDOCRINE  [x]                EARS,NOSE,THROAT [x]                 GASTROINTESTINAL [x]                 NEUROLOGIC    [x]                MUSCLOSKELTAL  [x]                  HEMATOLOGY      PHYSICAL EXAM (check box if normal)BP (!) 162/94 (BP Location: Right arm, Patient Position: Chair, Cuff Size: Adult Regular)   Pulse 72   Temp 97.7  F (36.5  C) (Oral)   Resp 16   Wt 82.6 kg (182 lb)   LMP 05/31/2006   SpO2 99%   BMI 30.29 kg/m          [x]            GENERAL:    [x]       EYES:  ICTERIC   []        YES  []                    NO  [x]           EXTREMITIES: Clubbing []       Y     [x]           N    [x]           EARS, NOSE, THROAT: Membranes Moist    YES   [x]                   NO []                  [x]           LUNGS:  CLEAR    YES       [x]                  NO    []                                 [x]           SKIN: Jaundice           YES       []                  NO    [x]                   Rash: YES       []                  NO    [x]                                     [x]             HEART: Regular Rate          YES       [x]                  NO    []                   Incision Clean:  YES       [x]                  NO    []                                [x]                    ABDOMEN: Wound healed well Organomegaly YES       []                  NO    [x]                       [x]                    NEUROLOGICAL:  Nonfocal  YES       [x]                  NO    []                       [x]                    Hernia YES       []                  NO    [x]                   PSYCHIATRIC:  Appropriate  YES       [x]                  NO    []                       OTHER:                                                                                                   PAIN SCALE:: 3

## 2024-09-23 NOTE — NURSING NOTE
"Chief Complaint   Patient presents with    Follow Up     Liver transplant follow-up     Vital signs:  Temp: 97.7  F (36.5  C) Temp src: Oral BP: (!) 162/94 Pulse: 72   Resp: 16 SpO2: 99 %       Weight: 82.6 kg (182 lb)  Estimated body mass index is 30.29 kg/m  as calculated from the following:    Height as of 4/24/24: 1.651 m (5' 5\").    Weight as of this encounter: 82.6 kg (182 lb).      Dennys Boothe RN on 9/23/2024 at 9:55 AM    "

## 2024-09-23 NOTE — LETTER
9/23/2024      Abiola Matute  [address] [facility] [address]      Dear Colleague,    Thank you for referring your patient, Abiola Matute, to the [facility]PL[facility]. Please see a copy of my visit note below.    Transplant Surgery -OUTPATIENT IMMUNOSUPPRESSION PROGRESS NOTE    Date of Visit: 09/23/2024    Transplants:  6/22/2024 (Liver); Postoperative day:  93  ASSESMENT AND PLAN:  1.Graft Function:Liver allograft: no rejection or technical problems.    2.Immunosuppression Management:keep cyclosporine levels at 200 ng/dL and cellcept for a total of 6 months, wean prednisone, 2.5 mg daily for a week and stop  3.Hypertension:ok  4.Renal Function:good  5.Lab frequency:weekly  6.Other:  Has hair loss if it continues, may refer to dermatology    Date: September 23, 2024    Transplant:  [x]                             Liver [x]                              Kidney []                             Pancreas []                              Other:             Chief Complaint:  Doing well and losing hair    History of Present Illness:  Patient Active Problem List   Diagnosis     Non morbid obesity due to excess calories     Other isolated or specific phobias     Other congenital malformations of mouth     Contact dermatitis and other eczema, due to unspecified cause     Intestinal infection due to Clostridium difficile     Iron deficiency anemia, unspecified iron deficiency anemia type     Rosacea     Atypical ductal hyperplasia of left breast     Idiopathic thrombocytopenia (H)     Postmenopausal- s/p hysterectomy with BSO - not on HRT secondary to atypical ductal hyperplasia & breast pain -no paps needed     Claustrophobia     S/P total hysterectomy and bilateral salpingo-oophorectomy     Abnormal liver enzymes- ? related to ongoing etoh use/abuse     Essential hypertension with goal blood pressure less than 140/90     Gastroenteritis     Stool incontinence     CARDIOVASCULAR SCREENING; LDL  GOAL LESS THAN 130     AA (alcohol abuse) - ? ongoing      Alcoholic fatty liver     Acquired hyperbilirubinemia- ? secondary to alcohol use     Diffuse nodular cirrhosis of liver (H)     Severe Perianal Crohn's Disease     Biliary colic     Cholecystitis     Alcoholic cirrhosis of liver with ascites (H) - seeing [facility]      Abscess of anal or rectal region     Anal fistula     HCC (hepatocellular carcinoma) (H)     Alcohol use disorder, moderate, dependence (H)     Crohn's disease of large intestine with fistula (H)     Ascending aorta dilation (H24)     Hyponatremia     Liver cirrhosis (H)     S/P liver transplant (H)     Immunosuppressed status (H24)     Acute post-operative pain     Insomnia     Anemia due to blood loss, acute     Moderate malnutrition (H24)     RADHA (acute kidney injury) (H24)     Hypomagnesemia     Liver replaced by transplant (H)     Fever, unspecified fever cause     Pleural effusion on right     Pneumonia of right lung due to infectious organism, unspecified part of lung     Xerosis cutis     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 Varma      BIOPSY LIVER  2019     COLONOSCOPY  2006     COLONOSCOPY N/A 12/23/2014    Procedure: COMBINED COLONOSCOPY, SINGLE OR MULTIPLE BIOPSY/POLYPECTOMY BY BIOPSY;  Surgeon: Diane Fleming MD;  Location: [facility]     COLONOSCOPY N/A 12/5/2019    Procedure: COLONOSCOPY, WITH POLYPECTOMY AND BIOPSY;  Surgeon: Farhan Schilling MD;  Location: [facility]     COLONOSCOPY N/A 2/27/2024    Procedure: COLONOSCOPY, WITH POLYPECTOMY AND BIOPSY;  Surgeon: Michelle Diaz MD;  Location: [facility] OR     ENDOSCOPIC RETROGRADE CHOLANGIOPANCREATOGRAM N/A 4/24/2020    Procedure: ENDOSCOPIC RETROGRADE CHOLANGIOPANCREATOGRAPHY WITH, sledge removal,sphincterotomy, stent in gallbladder and pancreatic duct stent, and balloon dilation;  Surgeon: Guru Isac Kraft MD;  Location: [facility]     ESOPHAGOSCOPY, GASTROSCOPY, DUODENOSCOPY (EGD), COMBINED N/A 12/5/2019    Procedure: ESOPHAGOGASTRODUODENOSCOPY (EGD);  Surgeon: Farhan Schilling MD;  Location: [facility]     ESOPHAGOSCOPY, GASTROSCOPY, DUODENOSCOPY (EGD), COMBINED N/A 5/11/2023    Procedure: Esophagoscopy, gastroscopy, duodenoscopy (EGD), combined;  Surgeon: Jeannette Cervantes MD;  Location: [facility]     EXAM UNDER ANESTHESIA ANUS N/A 3/28/2019    Procedure: EXAM UNDER ANESTHESIA ANUS;  Surgeon: Diane Fleming MD;  Location:  OR     EXAM UNDER ANESTHESIA ANUS N/A 2/26/2021    Procedure: EXAM UNDER ANESTHESIA OF ANUS, SETON PLACEMENT, EXCISION OF SKIN BRIDGE;  Surgeon: Avtar Nam MD;  Location: [facility] OR     EXAM UNDER ANESTHESIA ANUS N/A 1/6/2023    Procedure: EXAM UNDER ANESTHESIA, ANUS, SETON EXCHANGE,  partial fistulotomy;  Surgeon: Mary Dugan MD;  Location: [facility] OR     HYSTERECTOMY, VAGINAL  2006    with Dr. Licha Zhou - with BSO for fibroids      IR GALLBLADDER DRAIN PLACEMENT  2020     IR SIRT (SELECTIVE INTERNAL RADIO THERAPY)  2023     IR VISCERAL ANGIOGRAM  2023     IR VISCERAL EMBOLIZATION  2023     LAPAROSCOPIC ABLATION LIVER TUMOR N/A 2023    Procedure: Diagnostic Laparoscopy, Laparoscopic microwave ablation of liver tumor intraoperative ultrasound of liver, lysis of adhesions 1 hour,;  Surgeon: Albino Saavedra MD;  Location: [facility] OR     OPEN REDUCTION INTERNAL FIXATION ANKLE Left at age 27    plates and screws removed at age 36     Pelviscopy with removal of bilateral hydrosalpinges.  04/15/2010     TRANSPLANT LIVER RECIPIENT  DONOR N/A 2024    Procedure: Transplant liver recipient  donor, with biliary stent placement;  Surgeon: Pravin Ball MD;  Location: [facility] OR     ZZC APPENDECTOMY  at age 14     Social History     Socioeconomic History     Marital status:      Spouse name: Albino     Number of children: 3     Years of education: 14     Highest education level: Not on file   Occupational History     Occupation: unemployed   Tobacco Use     Smoking status: Never     Passive exposure: Never     Smokeless tobacco: Never   Vaping Use     Vaping status: Never Used   Substance and Sexual Activity     Alcohol use: Not Currently     Drug use: No     Sexual activity: Yes     Partners: Male     Birth control/protection: Post-menopausal     Comment: husb had vasectomy   Other Topics Concern      Service No     Blood Transfusions No     Caffeine Concern No     Comment: rarely drinks caffeine     Occupational Exposure Not Asked     Hobby Hazards Not Asked     Sleep Concern Not Asked     Stress Concern Not Asked     Weight Concern Not Asked     Special Diet Not Asked     Back Care Not Asked     Exercise Yes     Comment: does a lot of  walking - 4x/week     Bike Helmet Not Asked     Seat Belt Yes     Comment: always     Self-Exams Yes     Comment: SBE encouraged monthly     Parent/sibling w/ CABG, MI or angioplasty before 65F 55M? Yes   Social History Narrative    calcium - drinks 5-6 large glasses skim milk/day    flex sig/colonoscopy -at age 49    sun precautions - discussed    mammogram - needs every 2 years in her 30's, then yearly from then on    Td booster - 9/99 and 4/27/2010    pneumovax -at age 59    DEXA -when perimenopausal    stool hemoccults - every year after age 40    ASA- start at age 39    mulvitamin - encouraged     Social Determinants of Health     Financial Resource Strain: Low Risk  (2/4/2024)    Financial Resource Strain      Within the past 12 months, have you or your family members you live with been unable to get utilities (heat, electricity) when it was really needed?: No   Food Insecurity: Low Risk  (2/4/2024)    Food Insecurity      Within the past 12 months, did you worry that your food would run out before you got money to buy more?: No      Within the past 12 months, did the food you bought just not last and you didn t have money to get more?: No   Transportation Needs: Low Risk  (2/4/2024)    Transportation Needs      Within the past 12 months, has lack of transportation kept you from medical appointments, getting your medicines, non-medical meetings or appointments, work, or from getting things that you need?: No   Physical Activity: Unknown (9/15/2020)    Exercise Vital Sign      Days of Exercise per Week: 0 days      Minutes of Exercise per Session: Not on file   Stress: Stress Concern Present (9/15/2020)    [employer] [place] [employer] - Occupational Stress Questionnaire      Feeling of Stress : Rather much   Social Connections: Unknown (9/15/2020)    Social Connection and Isolation Panel [NHANES]      Frequency of Communication with Friends and Family: More than three times a week      Frequency of  Social Gatherings with Friends and Family: More than three times a week      Attends Denominational Services: Not on file      Active Member of Clubs or Organizations: Not on file      Attends Club or Organization Meetings: Not on file      Marital Status: Not on file   Interpersonal Safety: Low Risk  (2/7/2024)    Interpersonal Safety      Do you feel physically and emotionally safe where you currently live?: Yes      Within the past 12 months, have you been hit, slapped, kicked or otherwise physically hurt by someone?: No      Within the past 12 months, have you been humiliated or emotionally abused in other ways by your partner or ex-partner?: No   Housing Stability: Low Risk  (2/4/2024)    Housing Stability      Do you have housing? : Yes      Are you worried about losing your housing?: No     Prescription Medications as of 9/23/2024         Rx Number Disp Refills Start End Last Dispensed Date Next Fill Date Owning Pharmacy    acetaminophen (TYLENOL) 325 MG tablet  60 tablet 0 6/28/2024 --   [facility] [address]    Sig: Take 2 tablets (650 mg) by mouth every 6 hours as needed for mild pain    Class: E-Prescribe    Route: Oral    albuterol (PROVENTIL) (2.5 MG/3ML) 0.083% neb solution  90 mL 5 8/2/2024 --   [facility] [address]    Sig: Take 1 vial (2.5 mg) by nebulization every 28 days    Class: E-Prescribe    Route: Nebulization    amoxicillin (AMOXIL) 500 MG capsule  4 capsule 0 8/20/2024 --   [id number] [address] AT [place] [address] & [facility]    Sig: Take 4 capsules (2,000 mg) by mouth once as needed (30-60 minutes before dental procedure)    Class: E-Prescribe    Route: Oral    aspirin (ASA) 81 MG chewable tablet  30 tablet 11 7/17/2024 --   [facility] [place], [place] - [address] [place] [address] [facility]    Sig: Take 1 tablet (81 mg) by mouth or Feeding Tube daily     Class: E-Prescribe    Route: Oral or Feeding Tube    cycloSPORINE modified (GENERIC EQUIVALENT) 100 MG capsule  60 capsule 11 9/16/2024 --   [facility] [place] [address]    Sig: Take 1 capsule (100 mg) by mouth 2 times daily. Total dose 150mg BID    Class: E-Prescribe    Route: Oral    cycloSPORINE modified (GENERIC EQUIVALENT) 25 MG capsule  120 capsule 11 9/16/2024 --   [facility] [place] [address]    Sig: Take 2 capsules (50 mg) by mouth every 12 hours. Total dose 150mg BID    Class: E-Prescribe    Route: Oral    diazePAM, 15 MG Dose, (VALTOCO 15 MG DOSE) 2 x 7.5 MG/0.1ML LQPK  2 each 2 8/26/2024 --   [facility] [address]    Sig: [place] 7.5 mg in nostril as needed (To be used for GTC greater than 3 to 4 minutes, focal seizures [smaller seizures] greater than 10 minutes, 3 or more GTC or focal seizures within 24 hours).    Class: E-Prescribe    Route: Nasal    diphenhydrAMINE (BENADRYL) 50 MG capsule  -- --  --       Sig: Take 50 mg by mouth nightly as needed for sleep    Class: Historical    Route: Oral    emollient (VANICREAM) external cream  453 g 1 7/6/2024 --       Sig: Apply topically every 6 hours as needed for other ([place])    Class: No Print Out    Route: Topical    levETIRAcetam (KEPPRA) 1000 MG tablet  180 tablet 3 9/3/2024 --   [facility] - [place], [place] - [address] [place] [address]    Sig: Take 1 tablet (1,000 mg) by mouth 3 times daily.    Class: E-Prescribe    Route: Oral    magnesium oxide (MAG-OX) 400 MG tablet  120 tablet 11 7/17/2024 --   [facility] [address] [address]    Sig: Take 2 tablets (800 mg) by mouth 2 times daily    Class: E-Prescribe    Route: Oral    midazolam 5 mg/mL (VERSED) 5 mg/mL intranasal solution  2 mL 2 9/20/2024 --   [facility] [address]    Sig: Apply 1 mL (5 mg) into one nostril as directed once as needed for seizures (1 squirt in JUST ONE nostril.  To be used for GTC greater than 3 to 4 minutes, focal seizures [smaller seizures] greater than 10 minutes, 3 or more GTC or focal seizures within 24 hours).    Class: E-Prescribe    Route: Intranasal    mycophenolate (GENERIC EQUIVALENT) 250 MG capsule  180 capsule 1 9/20/2024 --   [facility] [place] [address] [address]    Sig: Take 3 capsules (750 mg) by mouth 2 times daily.    Class: E-Prescribe    Notes to Pharmacy: TXP DT 6/22/2024 (Liver) TXP Dischg DT 6/28/2024 DX Liver replaced by transplant Z94.4 [facility] ([place], [place])    Route: Oral    pantoprazole (PROTONIX) 40 MG EC tablet  30 tablet 11 7/17/2024 --   [facility] [place] [address] [address]    Sig: Take 1 tablet (40 mg) by mouth daily    Class: E-Prescribe    Route: Oral    pentamidine (NEBUPENT) 300 MG neb solution  -- -- 8/2/2024 --       Sig: Inhale 300 mg into the lungs every 28 days    Class: No Print Out    Route: Inhalation    polyethylene glycol (MIRALAX) 17 GM/Dose powder  510 g 0 6/28/2024 --   [place] [address]    Sig: Take 17 g by mouth 2 times daily as needed for constipation    Class: E-Prescribe    Route: Oral    predniSONE (DELTASONE) 5 MG tablet  30 tablet 2 7/17/2024 --   [facility] [place] [address]    Sig: Take 1 tablet (5 mg) by mouth daily    Class: E-Prescribe    Notes to Pharmacy: TXP DT 6/22/2024 (Liver) TXP Dischg DT 6/28/2024 DX Liver replaced by transplant Z94.4 [facility] ([place], [place])    Route: Oral    psyllium (METAMUCIL) WAFR  60 Wafer 2 7/7/2024 --   [place] [address]    Sig: Take 2 Wafers by  mouth daily    Class: E-Prescribe    Route: Oral    traZODone (DESYREL) 50 MG tablet  30 tablet 2 7/30/2024 --   [facility] [place] [address]    Sig: Take 0.5-1 tablets (25-50 mg) by mouth nightly as needed for sleep    Class: E-Prescribe    Route: Oral    ursodiol (ACTIGALL) 300 MG capsule  90 capsule 11 7/17/2024 --   [facility] [place] [address] [address]    Sig: Take 1 capsule (300 mg) by mouth or Feeding Tube 3 times daily    Class: E-Prescribe    Route: Oral or Feeding Tube    valGANciclovir (VALCYTE) 450 MG tablet  90 tablet 1 7/19/2024 --   [facility] [address] [address]    Sig: Take 1 tablet (450 mg) by mouth daily    Class: E-Prescribe    Route: Oral          Clinic-Administered Medications as of 9/23/2024         Dose Frequency Start End    hyoscyamine (LEVSIN/SL) sublingual tablet 125 mcg 125 mcg EVERY 4 HOURS PRN 6/16/2022 --    Admin Instructions: Patients using antacids should take hyoscyamine before meals and the antacid after meals.    Route: Sublingual          Blood transfusion related (informational only), Dapsone, Fish oil, Metronidazole, Ppd [tuberculin purified protein derivative], Sulfa antibiotics, and Terbinafine and related   REVIEW OF SYSTEMS (check box if normal)  [x]               GENERAL  [x]                 PULMONARY [x]                GENITOURINARY  [x]                CNS                 [x]                 CARDIAC  [x]                 ENDOCRINE  [x]                EARS,NOSE,THROAT [x]                 GASTROINTESTINAL [x]                 NEUROLOGIC    [x]                MUSCLOSKELTAL  [x]                  HEMATOLOGY      PHYSICAL EXAM (check box if normal)BP (!) 162/94 (BP Location: Right arm, Patient Position: Chair, Cuff Size: Adult Regular)   Pulse 72   Temp 97.7  F (36.5  C) (Oral)   Resp 16   Wt 82.6 kg (182 lb)   LMP 05/31/2006   SpO2 99%   BMI 30.29 kg/m           [x]            GENERAL:    [x]       EYES:  ICTERIC   []        YES  []                    NO  [x]           EXTREMITIES: Clubbing []       Y     [x]           N    [x]           EARS, NOSE, THROAT: Membranes Moist    YES   [x]                   NO []                  [x]           LUNGS:  CLEAR    YES       [x]                  NO    []                                [x]           SKIN: Jaundice           YES       []                  NO    [x]                   Rash: YES       []                  NO    [x]                                     [x]             HEART: Regular Rate          YES       [x]                  NO    []                   Incision Clean:  YES       [x]                  NO    []                                [x]                    ABDOMEN: Wound healed well Organomegaly YES       []                  NO    [x]                       [x]                    NEUROLOGICAL:  Nonfocal  YES       [x]                  NO    []                       [x]                    Hernia YES       []                  NO    [x]                   PSYCHIATRIC:  Appropriate  YES       [x]                  NO    []                       OTHER:                                                                                                   PAIN SCALE:: 3       Again, thank you for allowing me to participate in the care of your patient.        Sincerely,        Pravin Ball MD

## 2024-09-24 ENCOUNTER — TELEPHONE (OUTPATIENT)
Dept: TRANSPLANT | Facility: CLINIC | Age: 59
End: 2024-09-24
Payer: MEDICARE

## 2024-09-24 DIAGNOSIS — Z94.4 S/P LIVER TRANSPLANT (H): ICD-10-CM

## 2024-09-24 LAB
GAMMA INTERFERON BACKGROUND BLD IA-ACNC: 0.04 IU/ML
M TB IFN-G BLD-IMP: NEGATIVE
M TB IFN-G CD4+ BCKGRND COR BLD-ACNC: 7.83 IU/ML
MITOGEN IGNF BCKGRD COR BLD-ACNC: 0 IU/ML
MITOGEN IGNF BCKGRD COR BLD-ACNC: 0.01 IU/ML
QUANTIFERON MITOGEN: 7.87 IU/ML
QUANTIFERON NIL TUBE: 0.04 IU/ML
QUANTIFERON TB1 TUBE: 0.04 IU/ML
QUANTIFERON TB2 TUBE: 0.05

## 2024-09-24 RX ORDER — PREDNISONE 5 MG/1
2.5 TABLET ORAL DAILY
Qty: 4 TABLET | Refills: 0 | Status: SHIPPED | OUTPATIENT
Start: 2024-09-24 | End: 2024-10-03

## 2024-09-24 NOTE — TELEPHONE ENCOUNTER
Attempted to reach [place], but no answer.  Left message for [place]. Decrease prednisone 2.5 mg daily and repeat labs in a week.    Pt reminded to set up pentamidine monthly.     Pt has no stent on xray.

## 2024-09-25 ENCOUNTER — VIRTUAL VISIT (OUTPATIENT)
Dept: PHARMACY | Facility: CLINIC | Age: 59
End: 2024-09-25
Payer: MEDICARE

## 2024-09-25 ENCOUNTER — TELEPHONE (OUTPATIENT)
Dept: TRANSPLANT | Facility: CLINIC | Age: 59
End: 2024-09-25
Payer: MEDICARE

## 2024-09-25 DIAGNOSIS — R56.9 SEIZURES (H): ICD-10-CM

## 2024-09-25 DIAGNOSIS — Z78.9 TAKES DIETARY SUPPLEMENTS: ICD-10-CM

## 2024-09-25 DIAGNOSIS — Z94.4 LIVER TRANSPLANTED (H): Primary | ICD-10-CM

## 2024-09-25 DIAGNOSIS — R19.5 LOOSE STOOLS: ICD-10-CM

## 2024-09-25 PROCEDURE — 99207 PR NO CHARGE LOS: CPT | Mod: 93 | Performed by: PHARMACIST

## 2024-09-25 RX ORDER — MULTIPLE VITAMINS W/ MINERALS TAB 9MG-400MCG
1 TAB ORAL DAILY
COMMUNITY

## 2024-09-25 RX ORDER — MAGNESIUM OXIDE 400 MG/1
400 TABLET ORAL 2 TIMES DAILY
Qty: 180 TABLET | Refills: 11 | Status: SHIPPED | OUTPATIENT
Start: 2024-09-25

## 2024-09-25 NOTE — PROGRESS NOTES
Medication Therapy Management (MTM) Encounter    ASSESSMENT:                            Medication Adherence/Access: No issues identified    Liver Transplant:   Patient is not experiencing any GERD symptoms and would benefit from tapering off pantoprazole to minimize the risks of long term PPI use. Patient was advised to reach out if experiencing any recurrent symptoms following the dose reduction. Patient would benefit from receiving the Hepatitis B vaccination series as well as the RSV vaccination at 6 months post transplant as part of post-liver transplant care. Patient would also benefit from receiving the flu vaccine before November.     Seizure:  Stable.     Supplements:   Patient will benefit from reducing magnesium supplementation dose as magnesium levels have been within normal limits. Patient would benefit from the initiation of biotin to strengthen hair as she is experiencing hair loss. Prednisone and ursodiol have alopecia listed in side effects, but no frequency given. Typically CSA increases male pattern hair growth, but does not cause alopecia.     Loose stools:   Stable.    PLAN:                            Decrease Pantoprazole 40mg every other day for 30 days, then try stopping. Let me know if you have issues coming off this.   You are due for Hep B vaccination series and RSV vaccination at 6 months post transplant.   You can get the Flu shot whenever, but preferably before November.   Reduce Magnesium 400mg (1 tab) twice daily.   You may use a Biotin supplement for hair loss (less than 5mg or 5000mcg daily).  Prednisone and Ursodiol may cause hair loss, but no frequencies given.     Follow-up: 11/20 at 9 AM    SUBJECTIVE/OBJECTIVE:                          Virginia Matute is a 59 year old female seen for a follow-up visit. Patient was accompanied by Albino.      Reason for visit: 3 months post transplant.    Allergies/ADRs: Reviewed in chart  Past Medical History: Reviewed in chart  Tobacco: She reports  that she has never smoked. She has never been exposed to tobacco smoke. She has never used smokeless tobacco.  Alcohol: not currently using  Caffeine: coffee occasionally.     Medication Adherence/Access: no issues reported    Liver transplant  Tacrolimus (seizure on tac)--> Cyclosporine 150mg twice daily  Mycophenolate Mofetil 750 mg twice daily  Prednisone 2.5 mg every morning x 1 week, then stop.   Pt reports hair loss on scalp, but has hair growth on arms.   /94 in clinic yesterday after clinic. At home: 130/84, 130/65, 136/82, 130/75.  Transplant date: 6/22/24  Vascular Prophylaxis: Aspirin 81mg daily. Denies gastrointestinal bleed sx.   Other meds: Ursodiol 300mg three times daily.   CMV prophylaxis: CrCl >40 mL/minute: Valcyte 450 mg once daily Donor (+), Recipient (-), treat 6 months post tx   PJP prophylaxis: Inhaled Pentamidine 300mg q 4 weeks for 6 months post txp. Overdue for pentamidine, but is getting it next week.   Per Dr. Gilbert: Remains unclear etiology at this time. 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 Primary team holding dapsone, since this was a potential culprit.     PPI use: Pantoprazole 40mg daily. Denies GERD sx.   Tx Coordinator: Zayra Paulino Tx MD: Dr. Cervantes, Dr. Ball, Using Med Card: Yes  Immunizations: annual flu shot [id number]; [id number]:  unknown; Prevnar 20: 2022; TDaP:  2020; Shingrix: x2, HBV: no immunity, COVID: Primary x 3    Estimated Creatinine Clearance: 75.6 mL/min (based on SCr of 0.84 mg/dL).    Seizure  Midazolam 5mg into one nostril as needed  Keppra 1000mg three times daily  Diazepam nasal was $700, did not .   No seizures since switching from Tacrolimus to CSA.     Supplements:   Mag Oxide 800mg twice daily ( 2 hours from MMF) .  MVI daily.   Lab  Results   Component Value Date    MAG 1.9 09/23/2024      Loose stools:   Metamucil fiber 2 wafers daily.   BMs normal.     Today's Vitals: LMP 05/31/2006   ----------------    I spent 31 minutes with this patient today. All changes were made via collaborative practice agreement with Dr. Ball. A copy of the visit note was provided to the patient's provider(s).    A summary of these recommendations was sent via [employer].    Dina Triana, PharmDIV Student     Demetri Mariee, PharmD  [facility] Pharmacist    Phone: [phone]     Telemedicine Visit Details  The patient's medications can be safely assessed via a telemedicine encounter.  Type of service:  Telephone visit  Originating Location (pt. Location): Home  Distant Location (provider location):  Off-site  Start Time:  9:42 AM  End Time:  10:13 AM     Medication Therapy Recommendations  Liver transplanted    Current Medication: pantoprazole (PROTONIX) 40 MG EC tablet   Rationale: No medical indication at this time - Unnecessary medication therapy - Indication   Recommendation: Discontinue Medication   Status: Accepted per CPA         Takes dietary supplements    Current Medication: magnesium oxide (MAG-OX) 400 MG tablet   Rationale: Dose too high - Dosage too high - Safety   Recommendation: Decrease Dose   Status: Accepted per CPA          Rationale: Untreated condition - Needs additional medication therapy - Indication   Recommendation: Start Medication - Biotin 1 MG Caps   Status: Accepted - no CPA Needed

## 2024-09-25 NOTE — PATIENT INSTRUCTIONS
"Recommendations from today's MTM visit:                                                      Decrease Pantoprazole 40mg every other day for 30 days, then try stopping. Let me know if you have issues coming off this.   You are due for Hep B vaccination series and RSV vaccination at 6 months post transplant.   You can get the Flu shot whenever, but preferably before November.   Reduce Magnesium 400mg (1 tab) twice daily.   You may use a Biotin supplement for hair loss (less than 5mg or 5000mcg daily).  Prednisone and Ursodiol may cause hair loss, but no frequencies given.     Follow-up: 11/20 at 9 AM     It was great speaking with you today.  I value your experience and would be very thankful for your time in providing feedback in our clinic survey. In the next few days, you may receive an email or text message from [employer] with a link to a survey related to your  clinical pharmacist.\"     To schedule another MTM appointment, please call the clinic directly or you may call the MTM scheduling line at [phone] or toll-free at [phone].     My Clinical Pharmacist's contact information:                                                      Please feel free to contact me with any questions or concerns you have.      Demetri Mariee, PharmD  MTM Pharmacist    Phone: [phone]     "

## 2024-10-01 ENCOUNTER — TELEPHONE (OUTPATIENT)
Dept: FAMILY MEDICINE | Facility: CLINIC | Age: 59
End: 2024-10-01
Payer: MEDICARE

## 2024-10-01 NOTE — TELEPHONE ENCOUNTER
Reason for Call:  Appointment Request    Patient requesting this type of appt:  VACCINE    Requested provider: MA/MATTHEW/LPN Visit    Reason patient unable to be scheduled: Needs to be scheduled by clinic    When does patient want to be seen/preferred time: ANYTIME IN EARLY NOVEMBER     Comments: PT IS WANTING RSV, COVID AND FLU VACCINE APPT.     Could we send this information to you in [place] or would you prefer to receive a phone call?:   No preference   Okay to leave a detailed message?: Yes at Cell number on file:    Telephone Information:   Mobile [phone]       Call taken on 10/1/2024 at 1:45 PM by Noemi Parnell

## 2024-10-03 ENCOUNTER — TELEPHONE (OUTPATIENT)
Dept: TRANSPLANT | Facility: CLINIC | Age: 59
End: 2024-10-03

## 2024-10-03 ENCOUNTER — LAB REQUISITION (OUTPATIENT)
Dept: LAB | Facility: CLINIC | Age: 59
End: 2024-10-03
Payer: MEDICARE

## 2024-10-03 ENCOUNTER — TELEPHONE (OUTPATIENT)
Dept: FAMILY MEDICINE | Facility: CLINIC | Age: 59
End: 2024-10-03

## 2024-10-03 DIAGNOSIS — Z20.828 CONTACT WITH AND (SUSPECTED) EXPOSURE TO OTHER VIRAL COMMUNICABLE DISEASES: ICD-10-CM

## 2024-10-03 DIAGNOSIS — Z48.288 ENCOUNTER FOR AFTERCARE FOLLOWING MULTIPLE ORGAN TRANSPLANT: ICD-10-CM

## 2024-10-03 DIAGNOSIS — Z79.899 OTHER LONG TERM (CURRENT) DRUG THERAPY: ICD-10-CM

## 2024-10-03 DIAGNOSIS — K50.113 CROHN'S DISEASE OF LARGE INTESTINE WITH FISTULA (H): Primary | ICD-10-CM

## 2024-10-03 DIAGNOSIS — Z94.4 LIVER TRANSPLANT STATUS (H): ICD-10-CM

## 2024-10-03 DIAGNOSIS — Z94.4 S/P LIVER TRANSPLANT (H): ICD-10-CM

## 2024-10-03 DIAGNOSIS — Z94.4 LIVER REPLACED BY TRANSPLANT (H): ICD-10-CM

## 2024-10-03 LAB
ALBUMIN SERPL BCG-MCNC: 4.2 G/DL (ref 3.5–5.2)
ALP SERPL-CCNC: 159 U/L (ref 40–150)
ALT SERPL W P-5'-P-CCNC: 27 U/L (ref 0–50)
ANION GAP SERPL CALCULATED.3IONS-SCNC: 14 MMOL/L (ref 7–15)
AST SERPL W P-5'-P-CCNC: 28 U/L (ref 0–45)
BILIRUB DIRECT SERPL-MCNC: <0.2 MG/DL (ref 0–0.3)
BILIRUB SERPL-MCNC: 0.6 MG/DL
BUN SERPL-MCNC: 27.3 MG/DL (ref 8–23)
CALCIUM SERPL-MCNC: 9 MG/DL (ref 8.8–10.4)
CHLORIDE SERPL-SCNC: 102 MMOL/L (ref 98–107)
CREAT SERPL-MCNC: 0.8 MG/DL (ref 0.51–0.95)
CYCLOSPORINE BLD LC/MS/MS-MCNC: 158 UG/L (ref 50–400)
EGFRCR SERPLBLD CKD-EPI 2021: 84 ML/MIN/1.73M2
ERYTHROCYTE [DISTWIDTH] IN BLOOD BY AUTOMATED COUNT: 14.6 % (ref 10–15)
GLUCOSE SERPL-MCNC: 100 MG/DL (ref 70–99)
HCO3 SERPL-SCNC: 23 MMOL/L (ref 22–29)
HCT VFR BLD AUTO: 35.3 % (ref 35–47)
HGB BLD-MCNC: 11.4 G/DL (ref 11.7–15.7)
MAGNESIUM SERPL-MCNC: 1.8 MG/DL (ref 1.7–2.3)
MCH RBC QN AUTO: 28.1 PG (ref 26.5–33)
MCHC RBC AUTO-ENTMCNC: 32.3 G/DL (ref 31.5–36.5)
MCV RBC AUTO: 87 FL (ref 78–100)
PHOSPHATE SERPL-MCNC: 4.4 MG/DL (ref 2.5–4.5)
PLATELET # BLD AUTO: 188 10E3/UL (ref 150–450)
POTASSIUM SERPL-SCNC: 4.1 MMOL/L (ref 3.4–5.3)
PROT SERPL-MCNC: 6.6 G/DL (ref 6.4–8.3)
RBC # BLD AUTO: 4.05 10E6/UL (ref 3.8–5.2)
SODIUM SERPL-SCNC: 139 MMOL/L (ref 135–145)
TME LAST DOSE: NORMAL H
TME LAST DOSE: NORMAL H
WBC # BLD AUTO: 2.5 10E3/UL (ref 4–11)

## 2024-10-03 PROCEDURE — 84100 ASSAY OF PHOSPHORUS: CPT | Mod: ORL | Performed by: TRANSPLANT SURGERY

## 2024-10-03 PROCEDURE — 80158 DRUG ASSAY CYCLOSPORINE: CPT | Mod: ORL | Performed by: TRANSPLANT SURGERY

## 2024-10-03 PROCEDURE — 83735 ASSAY OF MAGNESIUM: CPT | Mod: ORL | Performed by: TRANSPLANT SURGERY

## 2024-10-03 PROCEDURE — 82248 BILIRUBIN DIRECT: CPT | Mod: ORL | Performed by: TRANSPLANT SURGERY

## 2024-10-03 PROCEDURE — 94640 AIRWAY INHALATION TREATMENT: CPT | Performed by: INTERNAL MEDICINE

## 2024-10-03 PROCEDURE — 85027 COMPLETE CBC AUTOMATED: CPT | Mod: ORL | Performed by: TRANSPLANT SURGERY

## 2024-10-03 PROCEDURE — 94642 AEROSOL INHALATION TREATMENT: CPT | Performed by: INTERNAL MEDICINE

## 2024-10-03 PROCEDURE — 80053 COMPREHEN METABOLIC PANEL: CPT | Mod: ORL | Performed by: TRANSPLANT SURGERY

## 2024-10-03 RX ORDER — ALBUTEROL SULFATE 0.83 MG/ML
2.5 SOLUTION RESPIRATORY (INHALATION)
Status: CANCELLED | OUTPATIENT
Start: 2024-10-28

## 2024-10-03 RX ORDER — ALBUTEROL SULFATE 0.83 MG/ML
2.5 SOLUTION RESPIRATORY (INHALATION) ONCE
Status: COMPLETED | OUTPATIENT
Start: 2024-10-03 | End: 2024-10-03

## 2024-10-03 RX ORDER — ALBUTEROL SULFATE 0.83 MG/ML
2.5 SOLUTION RESPIRATORY (INHALATION) ONCE
Start: 2024-10-28 | End: 2024-10-28

## 2024-10-03 RX ORDER — PENTAMIDINE ISETHIONATE 300 MG/300MG
300 INHALANT RESPIRATORY (INHALATION)
Status: DISCONTINUED | OUTPATIENT
Start: 2024-10-03 | End: 2024-10-03 | Stop reason: HOSPADM

## 2024-10-03 RX ORDER — DIPHENHYDRAMINE HYDROCHLORIDE 50 MG/ML
50 INJECTION INTRAMUSCULAR; INTRAVENOUS
Status: CANCELLED
Start: 2024-10-28

## 2024-10-03 RX ORDER — PENTAMIDINE ISETHIONATE 300 MG/300MG
300 INHALANT RESPIRATORY (INHALATION)
Status: CANCELLED
Start: 2024-10-28

## 2024-10-03 RX ORDER — PANTOPRAZOLE SODIUM 40 MG/1
40 TABLET, DELAYED RELEASE ORAL EVERY OTHER DAY
COMMUNITY
Start: 2024-10-03 | End: 2024-10-14

## 2024-10-03 RX ORDER — ALBUTEROL SULFATE 90 UG/1
1-2 INHALANT RESPIRATORY (INHALATION)
Status: CANCELLED
Start: 2024-10-28

## 2024-10-03 RX ORDER — METHYLPREDNISOLONE SODIUM SUCCINATE 125 MG/2ML
125 INJECTION INTRAMUSCULAR; INTRAVENOUS
Status: CANCELLED
Start: 2024-10-28

## 2024-10-03 RX ORDER — EPINEPHRINE 1 MG/ML
0.3 INJECTION, SOLUTION, CONCENTRATE INTRAVENOUS EVERY 5 MIN PRN
Status: CANCELLED | OUTPATIENT
Start: 2024-10-28

## 2024-10-03 RX ADMIN — PENTAMIDINE ISETHIONATE 300 MG: 300 INHALANT RESPIRATORY (INHALATION) at 10:20

## 2024-10-03 RX ADMIN — ALBUTEROL SULFATE 2.5 MG: 0.83 SOLUTION RESPIRATORY (INHALATION) at 10:18

## 2024-10-03 NOTE — PROGRESS NOTES
Patient was seen today for a Pentamidine nebulizer tx ordered by Kim Morel PA-C.     Patient was first given 2.5 mg of albuterol nebulizer, after which Pentamidine 300 mg (Lot # [id number]) mixed with 6cc Sterile H20 was administered through a filtered nebulizer.     Pre-treatment: SpO2 = 99%   HR = 77   BBS = clear   Post-treatment: SpO2 = 98%  HR = 88  BBS = clear     No adverse side effects noted by the patient.     This service today was provided under the direct supervision of Dr. Smiley, who was available if needed.      Procedure was completed by Lina Camacho RRT.

## 2024-10-03 NOTE — TELEPHONE ENCOUNTER
Patient Call: General  Route to LPN    Reason for call: [facility] nurse looking to get a referral for patient to have outpatient physical therapy     Call back needed? Yes    Return Call Needed  Same as documented in contacts section  When to return call?: Same day: Route High Priority

## 2024-10-03 NOTE — TELEPHONE ENCOUNTER
Called and provided verbal orders below.     Mildred Rowland RN [facility]

## 2024-10-03 NOTE — TELEPHONE ENCOUNTER
Left a message for [facility] letting her know that Albino plans to go through pt's PCP for the PT referral.

## 2024-10-03 NOTE — TELEPHONE ENCOUNTER
Requesting orders from: Linda Macdonald  Provider is following patient: Yes  Is this a 60-day recertification request?  No    Orders Requested    Skilled Nursing  Request for discontinuation of care for skilled nursing  Goals have been met/progressing.      Confirmed ok to leave a detailed message with call back.  Contact information confirmed and updated as needed.    Soledad Horton RN

## 2024-10-03 NOTE — TELEPHONE ENCOUNTER
Albino stated that prednisone was stopped a week ago. Home care has discontinued. Pt will coordinate with the PCP for physical therapy.     Albino was discerning about the fact that we did not know about Dr. WILKERSON giving instructions to stop the prednisone. Please expect a detailed mychart message.     Also informed Albino that sharing the med change was not relayed to us. So writer suggested to Albino to send a mychart message about med changes that occur at the clinic visit would be helpful and if it is a duplicate, so be it.

## 2024-10-04 ENCOUNTER — TELEPHONE (OUTPATIENT)
Dept: FAMILY MEDICINE | Facility: CLINIC | Age: 59
End: 2024-10-04
Payer: MEDICARE

## 2024-10-04 NOTE — TELEPHONE ENCOUNTER
Home Health Care      SN Effective 09/29/24 - 1wk1    Reason for call:      [facility] - Order #[id number] - Cert Period: 08/28/24 - 10/26/24  - okay to discharge SN    Orders are needed for this patient.  above    Pt Provider: AVA Macdonald    Phone Number Homecare Nurse can be reached at: fax       Could we send this information to you in [place] or would you prefer to receive a phone call?:   na    Put in providers in box     Noemi K

## 2024-10-08 NOTE — CONFIDENTIAL NOTE
Zoya from [facility] calling to follow up on request for verbal orders for outpatient physical therapy.     Routing to PCP team to further assist. Please advise.     Zoya, [phone] secure line

## 2024-10-08 NOTE — TELEPHONE ENCOUNTER
Faxed signed form to [facility] #    SN Effective 09/29/24 1wk1 - Order #[id number]    Copied into HIMS / Filed in [place]

## 2024-10-08 NOTE — TELEPHONE ENCOUNTER
Called [facility] and gave verbal order for PT eval and treat. Advised if she needs faxed referral to call back as we will need more information about what is needed including indication.     Mildred Rowland RN [facility]

## 2024-10-10 ENCOUNTER — LAB (OUTPATIENT)
Dept: LAB | Facility: CLINIC | Age: 59
End: 2024-10-10
Payer: MEDICARE

## 2024-10-10 DIAGNOSIS — K70.31 ALCOHOLIC CIRRHOSIS OF LIVER WITH ASCITES (H): ICD-10-CM

## 2024-10-10 DIAGNOSIS — F10.11 ALCOHOL ABUSE, IN REMISSION: ICD-10-CM

## 2024-10-10 DIAGNOSIS — Z94.4 LIVER REPLACED BY TRANSPLANT (H): ICD-10-CM

## 2024-10-10 DIAGNOSIS — Z94.4 S/P LIVER TRANSPLANT (H): ICD-10-CM

## 2024-10-10 LAB
ALBUMIN SERPL BCG-MCNC: 4.3 G/DL (ref 3.5–5.2)
ALP SERPL-CCNC: 170 U/L (ref 40–150)
ALT SERPL W P-5'-P-CCNC: 24 U/L (ref 0–50)
ANION GAP SERPL CALCULATED.3IONS-SCNC: 11 MMOL/L (ref 7–15)
AST SERPL W P-5'-P-CCNC: 30 U/L (ref 0–45)
BILIRUB DIRECT SERPL-MCNC: <0.2 MG/DL (ref 0–0.3)
BILIRUB SERPL-MCNC: 0.8 MG/DL
BUN SERPL-MCNC: 27.4 MG/DL (ref 8–23)
CALCIUM SERPL-MCNC: 9.5 MG/DL (ref 8.8–10.4)
CHLORIDE SERPL-SCNC: 104 MMOL/L (ref 98–107)
CREAT SERPL-MCNC: 0.75 MG/DL (ref 0.51–0.95)
CYCLOSPORINE BLD LC/MS/MS-MCNC: 152 UG/L (ref 50–400)
EGFRCR SERPLBLD CKD-EPI 2021: >90 ML/MIN/1.73M2
ERYTHROCYTE [DISTWIDTH] IN BLOOD BY AUTOMATED COUNT: 14.7 % (ref 10–15)
GLUCOSE SERPL-MCNC: 99 MG/DL (ref 70–99)
HCO3 SERPL-SCNC: 25 MMOL/L (ref 22–29)
HCT VFR BLD AUTO: 39.4 % (ref 35–47)
HGB BLD-MCNC: 12.3 G/DL (ref 11.7–15.7)
MAGNESIUM SERPL-MCNC: 1.8 MG/DL (ref 1.7–2.3)
MCH RBC QN AUTO: 27.1 PG (ref 26.5–33)
MCHC RBC AUTO-ENTMCNC: 31.2 G/DL (ref 31.5–36.5)
MCV RBC AUTO: 87 FL (ref 78–100)
PHOSPHATE SERPL-MCNC: 4.5 MG/DL (ref 2.5–4.5)
PLATELET # BLD AUTO: 214 10E3/UL (ref 150–450)
POTASSIUM SERPL-SCNC: 4.6 MMOL/L (ref 3.4–5.3)
PROT SERPL-MCNC: 7.5 G/DL (ref 6.4–8.3)
RBC # BLD AUTO: 4.54 10E6/UL (ref 3.8–5.2)
SODIUM SERPL-SCNC: 140 MMOL/L (ref 135–145)
TME LAST DOSE: NORMAL H
TME LAST DOSE: NORMAL H
WBC # BLD AUTO: 2.5 10E3/UL (ref 4–11)

## 2024-10-10 PROCEDURE — 36415 COLL VENOUS BLD VENIPUNCTURE: CPT

## 2024-10-10 PROCEDURE — 84100 ASSAY OF PHOSPHORUS: CPT

## 2024-10-10 PROCEDURE — 80053 COMPREHEN METABOLIC PANEL: CPT

## 2024-10-10 PROCEDURE — 82248 BILIRUBIN DIRECT: CPT

## 2024-10-10 PROCEDURE — 85027 COMPLETE CBC AUTOMATED: CPT

## 2024-10-10 PROCEDURE — G0480 DRUG TEST DEF 1-7 CLASSES: HCPCS | Mod: 90

## 2024-10-10 PROCEDURE — 83735 ASSAY OF MAGNESIUM: CPT

## 2024-10-10 PROCEDURE — 80158 DRUG ASSAY CYCLOSPORINE: CPT

## 2024-10-10 NOTE — TELEPHONE ENCOUNTER
Patient left message concerning her hair loss.  Just started biotin, considering shaving her head.    At 4 months post transplant hair loss does seem to peak. Tends to Improve after 6 months with lower doses of Tacrolimus. Patient may use Biotin (5mg or less), topical Rogaine products) collagen if she likes in the meantime.    Demetri Mariee, PharmD  [facility] Pharmacist    Phone: [phone]

## 2024-10-14 ENCOUNTER — TELEPHONE (OUTPATIENT)
Dept: TRANSPLANT | Facility: CLINIC | Age: 59
End: 2024-10-14

## 2024-10-14 ENCOUNTER — OFFICE VISIT (OUTPATIENT)
Dept: GASTROENTEROLOGY | Facility: CLINIC | Age: 59
End: 2024-10-14
Attending: INTERNAL MEDICINE
Payer: MEDICARE

## 2024-10-14 VITALS
HEART RATE: 80 BPM | SYSTOLIC BLOOD PRESSURE: 124 MMHG | OXYGEN SATURATION: 100 % | DIASTOLIC BLOOD PRESSURE: 84 MMHG | BODY MASS INDEX: 30.62 KG/M2 | WEIGHT: 184 LBS

## 2024-10-14 DIAGNOSIS — C22.0 HCC (HEPATOCELLULAR CARCINOMA) (H): ICD-10-CM

## 2024-10-14 DIAGNOSIS — K50.119 CROHN'S DISEASE OF PERIANAL REGION WITH COMPLICATION (H): Primary | ICD-10-CM

## 2024-10-14 DIAGNOSIS — F10.11 ALCOHOL ABUSE, IN REMISSION: ICD-10-CM

## 2024-10-14 DIAGNOSIS — Z94.4 S/P LIVER TRANSPLANT (H): ICD-10-CM

## 2024-10-14 DIAGNOSIS — K70.31 ALCOHOLIC CIRRHOSIS OF LIVER WITH ASCITES (H): ICD-10-CM

## 2024-10-14 DIAGNOSIS — Z94.4 LIVER REPLACED BY TRANSPLANT (H): ICD-10-CM

## 2024-10-14 DIAGNOSIS — Z13.220 LIPID SCREENING: ICD-10-CM

## 2024-10-14 PROCEDURE — 99207 PR NO BILLABLE SERVICE THIS VISIT: CPT | Mod: GC | Performed by: INTERNAL MEDICINE

## 2024-10-14 PROCEDURE — G2211 COMPLEX E/M VISIT ADD ON: HCPCS | Performed by: INTERNAL MEDICINE

## 2024-10-14 PROCEDURE — 99214 OFFICE O/P EST MOD 30 MIN: CPT | Mod: 95 | Performed by: INTERNAL MEDICINE

## 2024-10-14 RX ORDER — MYCOPHENOLATE MOFETIL 250 MG/1
500 CAPSULE ORAL 2 TIMES DAILY
Qty: 180 CAPSULE | Refills: 1 | Status: SHIPPED | OUTPATIENT
Start: 2024-10-14 | End: 2024-10-18

## 2024-10-14 RX ORDER — ANTIARTHRITIC COMBINATION NO.2 900 MG
1 TABLET ORAL DAILY
COMMUNITY

## 2024-10-14 ASSESSMENT — PAIN SCALES - GENERAL: PAINLEVEL: NO PAIN (0)

## 2024-10-14 NOTE — PROGRESS NOTES
[facility]  LIVER TRANSPLANT CLINIC  VIDEO VISIT    A/P  Ms. 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    Post op course c/b    -fever, rash (thought 2/2 dapsone)  -hypoMag, hypoCa  -seizure (tac stopped)  -significant scalp hair loss  Medically doing well at this time     Graft function Excellent. AP mildly elevated, stable. Will watch closely.  HCC No HCC on explant. Will need HCC surveillance in 6 mo.  IS CSA and MMF. MMF taper and CSA goal per protocol. Taken off tac due to seizures  HYPOMG 2/2 IS Continue mag supplement  CMV proph D+/R-. On valcyte for 6 mo. Stop 12/22/24  Hair loss Started biotin. Briefly discussed alopecia areata.  Water retention She describes puffy hands and feet. Is not explicitly following low sodium diet (discussed less than 2000 mg/d). She is drinking a very large fluid volume (4.5 L/d) and instructed her to decrease fluid intake. Will get US. Ok to start furosemide 20 mg every other day.  PT Order has been sent to PCP    MEDS  Stop ursodiol  Start MMF taper   Off prednisone last 2 weeks  ASA until 12/22/24  Pentamidine until 12/22/24  Ok to stop PPI. Discussed rebound reflux and ok to take OTCS (Tums, MOM, PeptoBismol. Prevacid)  Rarely uses trazodone    RTC 3 mo in person or video  Jeannette Cervantes MD  Hepatology/ Liver Transplant  [facility]  ========================================================  PCP: Linda Macdonald NP    SUBJECTIVE  Ms. 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    Post op course c/b    -fever, rash (thought 2/2 dapsone)  -hypoMag, hypoCa  -seizure (tac stopped)  -significant scalp hair loss    Her appetite is good, in fact too much with sugar cravings. Her activity level is improving. She will get outpatient PT in the next week (currently getting at home)    She also has Crohn's and was seen today in GI clinic.On infliximab    HCC #Lesions 3.2  cm seg 7. . PreLT treatment MWA 8/29/23.  EXPLANT:  A. 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:   -See gross description for ablation site size   -No evidence to suggest pre-treatment vascular invasion or extrahepatic extension   -Advanced background cirrhosis, inactive (Laennec fibrosis stage 4C):   -Compatible with DUNN/MASLD etiology, now burnt out    -The PAS and PAS-D stains show no abnormal alpha-1-antitrypsin profile      -Iron stain is positive (2-3+) for stainable parenchymal and Kupffer iron    -Bile ducts are present in normal numbers and architecture  Gallbladder: Cholelithiases with chronic cholecystitis     IS: CSA and MMF   LABS: Up to date    Liver Function Studies -   Recent Labs   Lab Test 10/10/24  0813   PROTTOTAL 7.5   ALBUMIN 4.3   BILITOTAL 0.8   ALKPHOS 170*   AST 30   ALT 24     CBC RESULTS:   Recent Labs   Lab Test 10/10/24  0813   WBC 2.5*   RBC 4.54   HGB 12.3   HCT 39.4   MCV 87   MCH 27.1   MCHC 31.2*   RDW 14.7          REJECTION: None.  BILIARY ISSUES: None  STENT: out on AXR 9/23/24  KIDNEY FUNCTION:   Creatinine   Date Value Ref Range Status   10/10/2024 0.75 0.51 - 0.95 mg/dL Final   02/23/2021 0.71 0.52 - 1.04 mg/dL Final     BP: Good. No meds.  PREV:  Colonoscopy   Derm   Pap/pelvic   Mammo   Flu shot  DISEASE RECURRENCE: no alcohol since preLT  HCC surveillance:   OTHER ISSUES:  NEW ISSUES:     SOC: at home with   ROS: 10 point ROS neg other than the symptoms noted above in the HPI.    Exam  Gen Alert pleasant NAD  Resp No difficulty breathing. No cough  Skin No Jaundice  Eyes No icterus  Neuro HERRERA  MSK no muscle wasting  Psyche Pleasant, appropriate. Well groomed.  Abiola Matute is a 59 year old female who is being evaluated via a billable video visit.    Video-Visit Details  Type of service:  Video Visit  Video Start Time: 1213  Video End Time: 1250  Originating  Location (pt. Location):home with   Distant Location (provider location):  [facility] [place]      Platform used for Video Visit: Amwell or Doximity      Creatinine   Date Value Ref Range Status   10/10/2024 0.75 0.51 - 0.95 mg/dL Final   02/23/2021 0.71 0.52 - 1.04 mg/dL Final   ]   Lab Results   Component Value Date    BILITOTAL 0.8 10/10/2024    BILITOTAL 0.6 10/03/2024    BILITOTAL 0.8 09/23/2024    BILITOTAL 0.5 09/19/2024    BILITOTAL 0.7 09/12/2024    BILITOTAL 1.8 06/22/2021    BILITOTAL 1.9 04/27/2021    BILITOTAL 1.3 03/02/2021    BILITOTAL 1.9 10/27/2020    BILITOTAL 2.1 09/04/2020      Lab Results   Component Value Date    ALT 24 10/10/2024    ALT 52 06/22/2021      Lab Results   Component Value Date    ALBUMIN 4.3 10/10/2024    ALBUMIN 3.3 10/03/2022    ALBUMIN 3.6 06/22/2021       Hemoglobin   Date Value Ref Range Status   10/10/2024 12.3 11.7 - 15.7 g/dL Final   06/22/2021 13.7 11.7 - 15.7 g/dL Final   ]    Current Outpatient Medications   Medication Sig Dispense Refill    acetaminophen (TYLENOL) 325 MG tablet Take 2 tablets (650 mg) by mouth every 6 hours as needed for mild pain 60 tablet 0    albuterol (PROVENTIL) (2.5 MG/3ML) 0.083% neb solution Take 1 vial (2.5 mg) by nebulization every 28 days 90 mL 5    aspirin (ASA) 81 MG chewable tablet Take 1 tablet (81 mg) by mouth or Feeding Tube daily 30 tablet 11    Biotin 5000 MCG TABS Take 1 tablet by mouth daily.      cycloSPORINE modified (GENERIC EQUIVALENT) 100 MG capsule Take 1 capsule (100 mg) by mouth 2 times daily. Total dose 150mg BID 60 capsule 11    cycloSPORINE modified (GENERIC EQUIVALENT) 25 MG capsule Take 2 capsules (50 mg) by mouth every 12 hours. Total dose 150mg  capsule 11    emollient (VANICREAM) external cream Apply topically every 6 hours as needed for other (Dryness) (Patient taking differently: Apply topically every 6 hours as needed for other (Dryness). Or other products) 453 g 1     levETIRAcetam (KEPPRA) 1000 MG tablet Take 1 tablet (1,000 mg) by mouth 3 times daily. 180 tablet 3    magnesium oxide (MAG-OX) 400 MG tablet Take 1 tablet (400 mg) by mouth 2 times daily. 180 tablet 11    midazolam 5 mg/mL (VERSED) 5 mg/mL intranasal solution Apply 1 mL (5 mg) into one nostril as directed once as needed for seizures (1 squirt in JUST ONE nostril.  To be used for GTC greater than 3 to 4 minutes, focal seizures [smaller seizures] greater than 10 minutes, 3 or more GTC or focal seizures within 24 hours). 2 mL 2    multivitamin w/minerals (THERA-VIT-M) tablet Take 1 tablet by mouth daily.      mycophenolate (GENERIC EQUIVALENT) 250 MG capsule Take 3 capsules (750 mg) by mouth 2 times daily. 180 capsule 1    pantoprazole (PROTONIX) 40 MG EC tablet Take 1 tablet (40 mg) by mouth every other day. If no gerd, pt can stop      pentamidine (NEBUPENT) 300 MG neb solution Inhale 300 mg into the lungs every 28 days      psyllium (METAMUCIL) WAFR Take 2 Wafers by mouth daily 60 Wafer 2    traZODone (DESYREL) 50 MG tablet Take 0.5-1 tablets (25-50 mg) by mouth nightly as needed for sleep 30 tablet 2    ursodiol (ACTIGALL) 300 MG capsule Take 1 capsule (300 mg) by mouth or Feeding Tube 3 times daily 90 capsule 11    valGANciclovir (VALCYTE) 450 MG tablet Take 1 tablet (450 mg) by mouth daily 90 tablet 1     No current facility-administered medications for this visit.     Facility-Administered Medications Ordered in Other Visits   Medication Dose Route Frequency Provider Last Rate Last Admin    hyoscyamine (LEVSIN/SL) sublingual tablet 125 mcg  125 mcg Sublingual Q4H PRN Farhan Schilling MD         The longitudinal plan of care for the diagnosis(es)/condition(s) as documented were addressed during this visit. Due to the added complexity in care, I will continue to support Virginia in the subsequent management and with ongoing continuity of care.

## 2024-10-14 NOTE — PATIENT INSTRUCTIONS
It was a pleasure meeting with you today and discussing your healthcare plan. Below is a summary of what we covered:    - Continue with infliximab infusions every 8 weeks.   - Plan for an infliximab level with next infusion.   - Next colonoscopy in summer 2025    Follow-up in 6 months with Dr. Schilling.      Please see below for any additional questions and scheduling guidelines.    Sign up for [employer]: [employer] patient portal serves as a secure platform for accessing your medical records from the [facility]. Additionally, [employer] facilitates easy, timely, and secure messaging with your care team. If you have not signed up, you may do so by using the provided code or calling [phone].    Coordinating your care after your visit:  There are multiple options for scheduling your follow-up care based on your provider's recommendation.    How do I schedule a follow-up clinic appointment:   After your appointment, you may receive scheduling assistance with the Clinic Coordinators by having a seat in the waiting room and a Clinic Coordinator will call you up to schedule.  Virtual visits or after you leave the clinic:  Your provider has placed a follow-up order in the [employer] portal for scheduling your return appointment. A member of the scheduling team will contact you to schedule.  [employer] Scheduling: Timely scheduling through [employer] is advised to ensure appointment availability.   Call to schedule: You may schedule your follow-up appointment(s) by calling [phone], option 1.    How do I schedule my endoscopy or colonoscopy procedure:  If a procedure, such as a colonoscopy or upper endoscopy was ordered by your provider, the scheduling team will contact you to schedule this procedure. Or you may choose to call to schedule at   [phone], option 2.  Please allow 20-30 minutes when scheduling a procedure.    How do I get my blood work done? To get your blood work done, you need to schedule a lab  appointment at an [facility]. There are multiple ways to schedule:   At the clinic: The Clinic Coordinator you meet after your visit can help you schedule a lab appointment.   Endoart scheduling: [employer]oart offers online lab scheduling at all [facility] laboratory locations.   Call to schedule: You can call [phone] to schedule your lab appointment.    How do I schedule my imaging study: To schedule imaging studies, such as CT scans, ultrasounds, MRIs, or X-rays, contact Imaging Services at [phone].    How do I schedule a referral to another doctor: If your provider recommended a referral to another specialist(s), the referral order was placed by your provider. You will receive a phone call to schedule this referral, or you may choose to call the number attached to the referral to self-schedule.    For Post-Visit Question(s):  For any inquiries following today's visit:  Please utilize [employer] messaging and allow 48 hours for reply or contact the Call Center during normal business hours at [phone], option 3.  For Emergent After-hours questions, contact the On-Call GI Fellow through the [facility]  at [phone].  In addition, you may contact your Nurse directly using the provided contact information.    Test Results:  Test results will be accessible via [employer] in compliance with the 21st Century Cures Act. This means that your results will be available to you at the same time as your provider. Often you may see your results before your provider does. Results are reviewed by staff within two weeks with communication follow-up. Results may be released in the patient portal prior to your care team review.    Prescription Refill(s):  Medication prescribed by your provider will be addressed during your visit. For future refills, please coordinate with your pharmacy. If you have not had a recent clinic visit or routine labs, for your safety, your provider may not be  able to refill your prescription.

## 2024-10-14 NOTE — TELEPHONE ENCOUNTER
Instructed Albino of the following:    Per Dr coulter, patient to have US abdomen and will have patient to call to schedule. Order is placed.        Pt to start cellcept wean per dr coulter.   Pt taking 750 mg BID. Pt to decrease to cellcept 500 mg BID and repeat labs in a week.       Albino able to repeat instructions.

## 2024-10-14 NOTE — NURSING NOTE
Chief Complaint   Patient presents with    RECHECK     Crohn's disease of large intestine with fistula       Vitals:    10/14/24 0946   BP: 124/84   BP Location: Left arm   Patient Position: Sitting   Cuff Size: Adult Large   Pulse: 80   SpO2: 100%   Weight: 83.5 kg (184 lb)       Body mass index is 30.62 kg/m .      Jude Toribio MA     Adult

## 2024-10-14 NOTE — LETTER
10/14/2024      Abiola Matute  [address] [facility] [id number]      Dear Colleague,    Thank you for referring your patient, Abiola Matute, to the [facility] [place]. Please see a copy of my visit note below.    IBD CLINIC VISIT    CC/REFERRING MD:  Referred Self  REASON FOR CONSULTATION: Crohn's.     ASSESSMENT/PLAN:  59 year old female with Crohn's with perianal fistula s/p seton and cirrhosis c/b HCC.     1.  Crohn's disease with perianal fistula  Current Medications: Infliximab 10mg/kg every 8 weeks (resumed 9/2024 after transplant)  Current disease activity: HBI  5   Last endoscopic disease activity: 32/2024 - SES-CD of 0 (while off infliximab)     Infliximab history- started May 2019. Dose increase to 10 mg/kg every 8 weeks but had continued active disease, dose increase to 10 mg/kg every 4 weeks 11/2020. Trough level high at >34 in 3/2023 in the setting of new HCC. Dose reduced to 5 mg/kg every 4 weeks in 6/2023 to attempt to achieve a balance between IBD control and not too much immunosuppression in the setting of HCC.  Unfortunately, she switched to Medicare in late 2023 and has unable to obtain coverage for infliximab.  She has been off infliximab since 11/2023.  Medicare finally approved infliximab but at 5 mg/kg every 8 weeks which she resumed before transplant.     Now s/p liver transplant 6/2024 and doing well. Started back on infliximab 9/9/2024. We will need to make sure she isn't overly immunosuppressed between her transplant and IBD medications. Plan to check infliximab at next infusion. Her transplant meds will likely provide some control to her Crohn's so we may be able to deescalate her infliximab in future. If she develops recurrent infections, that would push us to deescalate or stop infliximab and see how she does on her transplant immunosuppression alone. No active perianal disease at this time (no seton in place). Plan for colonoscopy 1 year post  transplant.     -- Continue infliximab   -- Infliximab level before next infusion  -- Colonoscopy 1 year after transplant (summer 2025)      2. Cirrhosis with HCC s/p liver transplant  s/p radioembolization by IR 3/2023. Liver transplant 6/2024, currently on mycophenolate and cyclosporine (had seizure with tacrolimus). One admission for fever s/p transplant without finding of infection.   -- Follow-up with Dr. Cervantes in the liver clinic       4. IBD health maintenance  - saw [facility] pharmacy in April 2024.       Return to clinic in 6 months with Dr. Schilling. Earlier if significant findings on colonoscopy.       Patient discussed and seen with staff gastroenterologist Dr. Diaz who agrees with the plan.     Breezy Witt MD          IBD HISTORY  Age at diagnosis: 53 (4/2019)  Extent of disease: miguel angel-anal, anal  Disease phenotype: Miguel Angel-anal fistula  Miguel Angel-anal disease: Yes  Prior IBD surgeries: No. Seton placements  Prior IBD Medications: None    DRUG MONITORING  TPMT enzyme activity:     6-TGN/6-MMPN levels:    Biologic concentration:  10/2/2019: IFX 0.8, anti-IFX antibodies: 451 (Esoterix)  1/22/2020 infliximab level 2.3, no antibodies (this is an 8-week trough at 10 mg/kg)  3/4/21 IFX 25.1, no antibodies (4 week trough on 10mg/kg)  2/27/2023 IFX >34, no antibody (4 week trough on 10mg/kg)    sIBDQ:   IBDQ Score Date IBDQ - Total Score  (Higher score better)   6/13/2023   1:28 PM 45   5/31/2023   3:26 PM 51   9/30/2022   6:54 AM 49       HPI:   Here for follow up today, last saw Dr. Fuller/Joe 2/2024 and at that time was off of infliximab due to insurance changes. Colonoscopy 2/2024 showed Crohn's in remission (SES-CD of 0). Since then she was resumed on infliximab and then underwent liver transplant 6/22/2024. She was in and out of the hospital after transplant for fever (no source of infection) and seizure related to tacrolimus. On cyclosporine and mycophenoate for immunosuppression. Transplant surgery okay'ed  her to resume IFX in August (on 10mg/kg every 8 weeks). Last infusion 9/9/24.     Notes stools are inconsistent, she does take metamucil wafers which help. Start off as formed and then become liquid. Good day is 2-3 stools, bad day is 6. She notes yesterday was a bad day.     No mouth sores but has had joint pain knees and ankles. Joint pains got slightly better with infusion.        HBI:   Overall patient well being (prior day): 1 (below average)  Abdominal pain (prior day): 0 (None)  Number of liquid or soft stools (prior day): 4 (1 point per stool)  Abdominal mass on exam: 0 (None)  Complications (1 point for each):   Arthralgia    Remission <5  Mild activity 5-7  Moderate activity 8-16  Severe > 16    ROS:    Constitutional, HEENT, cardiovascular, pulmonary, GI, , musculoskeletal, neuro, skin, endocrine and psych systems are negative, except as otherwise noted.     PERTINENT PAST MEDICAL HISTORY:  Past Medical History:   Diagnosis Date     Alcohol abuse      Alcoholic cirrhosis of liver with ascites      Anal fistula      Atypical ductal hyperplasia of breast 09/10/2010    ERT not recommended -left - and flat epithelial atypia-scheduled for breast biopsy 9/17/2010      Bifid uvula      Cholelithiasis      Contact perianal dermatitis and other eczema     recurrent - clobetasol      Crohn disease      Fear of flying      - gets Ativan prn.      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 Varma      BIOPSY LIVER  2019     COLONOSCOPY  2006     COLONOSCOPY N/A 12/23/2014    Procedure: COMBINED COLONOSCOPY, SINGLE OR MULTIPLE BIOPSY/POLYPECTOMY BY BIOPSY;  Surgeon: Diane Fleming MD;  Location:  GI     COLONOSCOPY N/A 12/5/2019    Procedure: COLONOSCOPY, WITH POLYPECTOMY AND BIOPSY;  Surgeon: Farhan Schilling MD;  Location: [facility] GI     COLONOSCOPY N/A 2/27/2024    Procedure: COLONOSCOPY, WITH POLYPECTOMY AND BIOPSY;  Surgeon: Michelle Diaz MD;  Location: [facility] OR     ENDOSCOPIC RETROGRADE CHOLANGIOPANCREATOGRAM N/A 4/24/2020    Procedure: ENDOSCOPIC RETROGRADE CHOLANGIOPANCREATOGRAPHY WITH, sledge removal,sphincterotomy, stent in gallbladder and pancreatic duct stent, and balloon dilation;  Surgeon: Guru Isac Kraft MD;  Location: [facility] OR     ESOPHAGOSCOPY, GASTROSCOPY, DUODENOSCOPY (EGD), COMBINED N/A 12/5/2019    Procedure: ESOPHAGOGASTRODUODENOSCOPY (EGD);  Surgeon: Farhan Schilling MD;  Location: [facility] GI     ESOPHAGOSCOPY, GASTROSCOPY, DUODENOSCOPY (EGD), COMBINED N/A 5/11/2023    Procedure: Esophagoscopy, gastroscopy, duodenoscopy (EGD), combined;  Surgeon: Jeannette Cervantes MD;  Location: [facility] GI     EXAM UNDER ANESTHESIA ANUS N/A 3/28/2019    Procedure: EXAM UNDER ANESTHESIA ANUS;  Surgeon: Diane Fleming MD;  Location:  OR     EXAM UNDER ANESTHESIA ANUS N/A 2/26/2021    Procedure: EXAM UNDER ANESTHESIA OF ANUS, SETON PLACEMENT, EXCISION OF SKIN BRIDGE;  Surgeon: Avtar Nam MD;  Location: [facility] OR     EXAM UNDER ANESTHESIA ANUS N/A 1/6/2023    Procedure: EXAM UNDER ANESTHESIA, ANUS, SETON EXCHANGE, partial fistulotomy;  Surgeon: Mary Dugan MD;  Location: [facility] OR     HYSTERECTOMY, VAGINAL  2006    with Dr. Licha Zhou - with BSO for fibroids      IR GALLBLADDER DRAIN PLACEMENT  4/22/2020     IR SIRT (SELECTIVE INTERNAL RADIO THERAPY)  2/21/2023     IR VISCERAL ANGIOGRAM   2023     IR VISCERAL EMBOLIZATION  2023     LAPAROSCOPIC ABLATION LIVER TUMOR N/A 2023    Procedure: Diagnostic Laparoscopy, Laparoscopic microwave ablation of liver tumor intraoperative ultrasound of liver, lysis of adhesions 1 hour,;  Surgeon: Albino Saavedra MD;  Location: [facility] OR     OPEN REDUCTION INTERNAL FIXATION ANKLE Left at age 27    plates and screws removed at age 36     Pelviscopy with removal of bilateral hydrosalpinges.  04/15/2010     TRANSPLANT LIVER RECIPIENT  DONOR N/A 2024    Procedure: Transplant liver recipient  donor, with biliary stent placement;  Surgeon: Pravin Ball MD;  Location: [facility] OR     ZZC APPENDECTOMY  at age 14       PREVIOUS ENDOSCOPY:  2024 colonoscopy - SES-CD score 0, 2 polyps removed (1 in transverse colon and 1 in rectum -  returned as lymphoid aggregate and normal colon mucosa ).     3/7/19: Flex sig: Colon and rectum appeared normal. 2 large deep ulcers extending from the anal cnaal to left perianal area.     10/23/18: Colonoscopy: Endoscopically normal ileum.  Mild pan colonic congestion with contact friability thought to be related to portal hypertension and thrombocytopenia.  Prior anal verge ulcer is healed    14: Colonoscopy: Perianal skin tag noted.  Ileum normal, colon normal.    ALLERGIES:     Allergies   Allergen Reactions     Blood Transfusion Related (Informational Only) Other (See Comments)     Patient has a history of a clinically significant antibody against RBC antigens.  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 A delay in compatible RBCs may occur.     Dapsone Other (See Comments)     Symmetric drug-related intertriginous and flexural exanthema (SDRIFE)     Fish Oil      Redness and itching around eye area only - went away when fish oil capsules stopped      Metronidazole      pain/itching     Ppd [Tuberculin Purified Protein Derivative]      Sulfa Antibiotics      hives     Terbinafine And  Related      Lamisil = mild urticarial reaction       PERTINENT MEDICATIONS:    Current Outpatient Medications:      acetaminophen (TYLENOL) 325 MG tablet, Take 2 tablets (650 mg) by mouth every 6 hours as needed for mild pain, Disp: 60 tablet, Rfl: 0     albuterol (PROVENTIL) (2.5 MG/3ML) 0.083% neb solution, Take 1 vial (2.5 mg) by nebulization every 28 days, Disp: 90 mL, Rfl: 5     aspirin (ASA) 81 MG chewable tablet, Take 1 tablet (81 mg) by mouth or Feeding Tube daily, Disp: 30 tablet, Rfl: 11     Biotin 5000 MCG TABS, Take 1 tablet by mouth daily., Disp: , Rfl:      cycloSPORINE modified (GENERIC EQUIVALENT) 100 MG capsule, Take 1 capsule (100 mg) by mouth 2 times daily. Total dose 150mg BID, Disp: 60 capsule, Rfl: 11     cycloSPORINE modified (GENERIC EQUIVALENT) 25 MG capsule, Take 2 capsules (50 mg) by mouth every 12 hours. Total dose 150mg BID, Disp: 120 capsule, Rfl: 11     levETIRAcetam (KEPPRA) 1000 MG tablet, Take 1 tablet (1,000 mg) by mouth 3 times daily., Disp: 180 tablet, Rfl: 3     magnesium oxide (MAG-OX) 400 MG tablet, Take 1 tablet (400 mg) by mouth 2 times daily., Disp: 180 tablet, Rfl: 11     multivitamin w/minerals (THERA-VIT-M) tablet, Take 1 tablet by mouth daily., Disp: , Rfl:      mycophenolate (GENERIC EQUIVALENT) 250 MG capsule, Take 3 capsules (750 mg) by mouth 2 times daily., Disp: 180 capsule, Rfl: 1     pantoprazole (PROTONIX) 40 MG EC tablet, Take 1 tablet (40 mg) by mouth every other day. If no gerd, pt can stop, Disp: , Rfl:      pentamidine (NEBUPENT) 300 MG neb solution, Inhale 300 mg into the lungs every 28 days, Disp: , Rfl:      psyllium (METAMUCIL) WAFR, Take 2 Wafers by mouth daily, Disp: 60 Wafer, Rfl: 2     traZODone (DESYREL) 50 MG tablet, Take 0.5-1 tablets (25-50 mg) by mouth nightly as needed for sleep, Disp: 30 tablet, Rfl: 2     ursodiol (ACTIGALL) 300 MG capsule, Take 1 capsule (300 mg) by mouth or Feeding Tube 3 times daily, Disp: 90 capsule, Rfl: 11      valGANciclovir (VALCYTE) 450 MG tablet, Take 1 tablet (450 mg) by mouth daily, Disp: 90 tablet, Rfl: 1     emollient (VANICREAM) external cream, Apply topically every 6 hours as needed for other (Dryness) (Patient taking differently: Apply topically every 6 hours as needed for other (Dryness). Or other products), Disp: 453 g, Rfl: 1     midazolam 5 mg/mL (VERSED) 5 mg/mL intranasal solution, Apply 1 mL (5 mg) into one nostril as directed once as needed for seizures (1 squirt in JUST ONE nostril.  To be used for GTC greater than 3 to 4 minutes, focal seizures [smaller seizures] greater than 10 minutes, 3 or more GTC or focal seizures within 24 hours)., Disp: 2 mL, Rfl: 2  No current facility-administered medications for this visit.    Facility-Administered Medications Ordered in Other Visits:      hyoscyamine (LEVSIN/SL) sublingual tablet 125 mcg, 125 mcg, Sublingual, Q4H PRN, Farhan Schilling MD    SOCIAL HISTORY:  Social History     Socioeconomic History     Marital status:      Spouse name: Albino     Number of children: 3     Years of education: 14     Highest education level: Not on file   Occupational History     Occupation: unemployed   Tobacco Use     Smoking status: Never     Passive exposure: Never     Smokeless tobacco: Never   Vaping Use     Vaping status: Never Used   Substance and Sexual Activity     Alcohol use: Not Currently     Drug use: No     Sexual activity: Yes     Partners: Male     Birth control/protection: Post-menopausal     Comment: husb had vasectomy   Other Topics Concern      Service No     Blood Transfusions No     Caffeine Concern No     Comment: rarely drinks caffeine     Occupational Exposure Not Asked     Hobby Hazards Not Asked     Sleep Concern Not Asked     Stress Concern Not Asked     Weight Concern Not Asked     Special Diet Not Asked     Back Care Not Asked     Exercise Yes     Comment: does a lot of walking - 4x/week     Bike Helmet Not Asked     Seat Belt  Yes     Comment: always     Self-Exams Yes     Comment: SBE encouraged monthly     Parent/sibling w/ CABG, MI or angioplasty before 65F 55M? Yes   Social History Narrative    calcium - drinks 5-6 large glasses skim milk/day    flex sig/colonoscopy -at age 49    sun precautions - discussed    mammogram - needs every 2 years in her 30's, then yearly from then on    Td booster - 9/99 and 4/27/2010    pneumovax -at age 59    DEXA -when perimenopausal    stool hemoccults - every year after age 40    ASA- start at age 39    mulvitamin - encouraged     Social Determinants of Health     Financial Resource Strain: Low Risk  (2/4/2024)    Financial Resource Strain      Within the past 12 months, have you or your family members you live with been unable to get utilities (heat, electricity) when it was really needed?: No   Food Insecurity: Low Risk  (2/4/2024)    Food Insecurity      Within the past 12 months, did you worry that your food would run out before you got money to buy more?: No      Within the past 12 months, did the food you bought just not last and you didn t have money to get more?: No   Transportation Needs: Low Risk  (2/4/2024)    Transportation Needs      Within the past 12 months, has lack of transportation kept you from medical appointments, getting your medicines, non-medical meetings or appointments, work, or from getting things that you need?: No   Physical Activity: Unknown (9/15/2020)    Exercise Vital Sign      Days of Exercise per Week: 0 days      Minutes of Exercise per Session: Not on file   Stress: Stress Concern Present (9/15/2020)    [place] [employer] - Occupational Stress Questionnaire      Feeling of Stress : Rather much   Social Connections: Unknown (9/15/2020)    Social Connection and Isolation Panel [NHANES]      Frequency of Communication with Friends and Family: More than three times a week      Frequency of Social Gatherings with Friends and Family: More than three  times a week      Attends Protestant Services: Not on file      Active Member of Clubs or Organizations: Not on file      Attends Club or Organization Meetings: Not on file      Marital Status: Not on file   Interpersonal Safety: Low Risk  (2/7/2024)    Interpersonal Safety      Do you feel physically and emotionally safe where you currently live?: Yes      Within the past 12 months, have you been hit, slapped, kicked or otherwise physically hurt by someone?: No      Within the past 12 months, have you been humiliated or emotionally abused in other ways by your partner or ex-partner?: No   Housing Stability: Low Risk  (2/4/2024)    Housing Stability      Do you have housing? : Yes      Are you worried about losing your housing?: No       FAMILY HISTORY:  Family History   Problem Relation Age of Onset     Breast Cancer Mother      Gastrointestinal Disease Mother      Heart Disease Father 57     Hypertension Maternal Grandmother      Substance Abuse Maternal Grandfather      Diabetes Maternal Grandfather      Diabetes Paternal Grandmother      Heart Disease Paternal Grandfather      Cancer Sister      Skin Cancer Sister      Substance Abuse Maternal Uncle      Substance Abuse Maternal Uncle      Suicide Niece      Suicide Niece      Anesthesia Reaction No family hx of      Thrombosis No family hx of        Past/family/social history reviewed and no changes    PHYSICAL EXAMINATION:  Constitutional: aaox3, cooperative, pleasant, not dyspneic/diaphoretic, no acute distress  Vitals reviewed: /84 (BP Location: Left arm, Patient Position: Sitting, Cuff Size: Adult Large)   Pulse 80   Wt 83.5 kg (184 lb)   LMP 05/31/2006   SpO2 100%   BMI 30.62 kg/m    Wt:   Wt Readings from Last 2 Encounters:   10/14/24 83.5 kg (184 lb)   09/23/24 82.6 kg (182 lb)      Constitutional - general appearance is well and in no acute distress. Body habitus normal  Eyes - No redness or discharge  Respiratory - No cough, unlabored  breathing  Musculoskeletal - range of motion intact: Neck and arms  Abdomen: soft nontender nondistended   Neurological - No focal deficits  Psychiatric - No anxiety, alert & oriented     Attestation signed by Farhan Schilling MD at 10/21/2024  8:02 AM:  I performed a history and physical examination of this patient and discussed the management with Breezy Bauer MD on Oct 14, 2024. I reviewed the note and there are no changes to the past medical, family or social history.  A complete 10 point review of systems was obtained. Please see the HPI for pertinent positives and negatives. All other systems were reviewed and were found to be negative. I agree with the documented findings and plan of care as outlined.    I suspect we can slowly and safely titrate the infliximab down. There is a possibility that she does not need it post liver transplant, however, given history of miguel angel-anal disease slightly favor continuing infliximab.     We will follow her infliximab levels closely as well as her Crohn's and assess for any recurrence.     Farhan Schilling MD MS    [facility]  Inflammatory Bowel Disease Program  Division of Gastroenterology, Hepatology and Nutrition      Again, thank you for allowing me to participate in the care of your patient.        Sincerely,        Farhan Schilling MD

## 2024-10-14 NOTE — LETTER
10/14/2024      Abiola Matute  [address] [facility] [address]      Dear Colleague,    Thank you for referring your patient, Abiola Matute, to the [facility] [place]. Please see a copy of my visit note below.    [facility]  LIVER TRANSPLANT CLINIC  VIDEO VISIT    A/P  Ms. 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    Post op course c/b    -fever, rash (thought 2/2 dapsone)  -hypoMag, hypoCa  -seizure (tac stopped)  -significant scalp hair loss  Medically doing well at this time     Graft function Excellent. AP mildly elevated, stable. Will watch closely.  HCC No HCC on explant. Will need HCC surveillance in 6 mo.  IS CSA and MMF. MMF taper and CSA goal per protocol. Taken off tac due to seizures  HYPOMG 2/2 IS Continue mag supplement  CMV proph D+/R-. On valcyte for 6 mo. Stop 12/22/24  Hair loss Started biotin. Briefly discussed alopecia areata.  Water retention She describes puffy hands and feet. Is not explicitly following low sodium diet (discussed less than 2000 mg/d). She is drinking a very large fluid volume (4.5 L/d) and instructed her to decrease fluid intake. Will get US. Ok to start furosemide 20 mg every other day.  PT Order has been sent to PCP    MEDS  Stop ursodiol  Start MMF taper   Off prednisone last 2 weeks  ASA until 12/22/24  Pentamidine until 12/22/24  Ok to stop PPI. Discussed rebound reflux and ok to take OTCS (Tums, MOM, PeptoBismol. Prevacid)  Rarely uses trazodone    RTC 3 mo in person or video  Jeannette Cervantes MD  Hepatology/ Liver Transplant  [facility]  ========================================================  PCP: Linda Macdonald NP    SUBJECTIVE  Ms. 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    Post op course c/b    -fever, rash (thought 2/2 dapsone)  -hypoMag, hypoCa  -seizure (tac stopped)  -significant scalp hair  loss    Her appetite is good, in fact too much with sugar cravings. Her activity level is improving. She will get outpatient PT in the next week (currently getting at home)    She also has Crohn's and was seen today in GI clinic.On infliximab    HCC #Lesions 3.2 cm seg 7. . PreLT treatment MWA 8/29/23.  EXPLANT:  A. 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:   -See gross description for ablation site size   -No evidence to suggest pre-treatment vascular invasion or extrahepatic extension   -Advanced background cirrhosis, inactive (Laennec fibrosis stage 4C):   -Compatible with DUNN/MASLD etiology, now burnt out    -The PAS and PAS-D stains show no abnormal alpha-1-antitrypsin profile      -Iron stain is positive (2-3+) for stainable parenchymal and Kupffer iron    -Bile ducts are present in normal numbers and architecture  Gallbladder: Cholelithiases with chronic cholecystitis     IS: CSA and MMF   LABS: Up to date    Liver Function Studies -   Recent Labs   Lab Test 10/10/24  0813   PROTTOTAL 7.5   ALBUMIN 4.3   BILITOTAL 0.8   ALKPHOS 170*   AST 30   ALT 24     CBC RESULTS:   Recent Labs   Lab Test 10/10/24  0813   WBC 2.5*   RBC 4.54   HGB 12.3   HCT 39.4   MCV 87   MCH 27.1   MCHC 31.2*   RDW 14.7          REJECTION: None.  BILIARY ISSUES: None  STENT: out on AXR 9/23/24  KIDNEY FUNCTION:   Creatinine   Date Value Ref Range Status   10/10/2024 0.75 0.51 - 0.95 mg/dL Final   02/23/2021 0.71 0.52 - 1.04 mg/dL Final     BP: Good. No meds.  PREV:  Colonoscopy   Derm   Pap/pelvic   Mammo   Flu shot  DISEASE RECURRENCE: no alcohol since preLT  HCC surveillance:   OTHER ISSUES:  NEW ISSUES:     SOC: at home with   ROS: 10 point ROS neg other than the symptoms noted above in the HPI.    Exam  Gen Alert pleasant NAD  Resp No difficulty breathing. No cough  Skin No Jaundice  Eyes No icterus  Neuro HERRERA  MSK no  muscle wasting  Psyche Pleasant, appropriate. Well groomed.  Abiola Matute is a 59 year old female who is being evaluated via a billable video visit.    Video-Visit Details  Type of service:  Video Visit  Video Start Time: 1213  Video End Time: 1250  Originating Location (pt. Location):home with   Distant Location (provider location):  [facility] [place]      Platform used for Video Visit: Amwell or Doximity      Creatinine   Date Value Ref Range Status   10/10/2024 0.75 0.51 - 0.95 mg/dL Final   02/23/2021 0.71 0.52 - 1.04 mg/dL Final   ]   Lab Results   Component Value Date    BILITOTAL 0.8 10/10/2024    BILITOTAL 0.6 10/03/2024    BILITOTAL 0.8 09/23/2024    BILITOTAL 0.5 09/19/2024    BILITOTAL 0.7 09/12/2024    BILITOTAL 1.8 06/22/2021    BILITOTAL 1.9 04/27/2021    BILITOTAL 1.3 03/02/2021    BILITOTAL 1.9 10/27/2020    BILITOTAL 2.1 09/04/2020      Lab Results   Component Value Date    ALT 24 10/10/2024    ALT 52 06/22/2021      Lab Results   Component Value Date    ALBUMIN 4.3 10/10/2024    ALBUMIN 3.3 10/03/2022    ALBUMIN 3.6 06/22/2021       Hemoglobin   Date Value Ref Range Status   10/10/2024 12.3 11.7 - 15.7 g/dL Final   06/22/2021 13.7 11.7 - 15.7 g/dL Final   ]    Current Outpatient Medications   Medication Sig Dispense Refill     acetaminophen (TYLENOL) 325 MG tablet Take 2 tablets (650 mg) by mouth every 6 hours as needed for mild pain 60 tablet 0     albuterol (PROVENTIL) (2.5 MG/3ML) 0.083% neb solution Take 1 vial (2.5 mg) by nebulization every 28 days 90 mL 5     aspirin (ASA) 81 MG chewable tablet Take 1 tablet (81 mg) by mouth or Feeding Tube daily 30 tablet 11     Biotin 5000 MCG TABS Take 1 tablet by mouth daily.       cycloSPORINE modified (GENERIC EQUIVALENT) 100 MG capsule Take 1 capsule (100 mg) by mouth 2 times daily. Total dose 150mg BID 60 capsule 11     cycloSPORINE modified (GENERIC EQUIVALENT) 25 MG capsule Take 2 capsules (50 mg) by mouth  every 12 hours. Total dose 150mg  capsule 11     emollient (VANICREAM) external cream Apply topically every 6 hours as needed for other (Dryness) (Patient taking differently: Apply topically every 6 hours as needed for other (Dryness). Or other products) 453 g 1     levETIRAcetam (KEPPRA) 1000 MG tablet Take 1 tablet (1,000 mg) by mouth 3 times daily. 180 tablet 3     magnesium oxide (MAG-OX) 400 MG tablet Take 1 tablet (400 mg) by mouth 2 times daily. 180 tablet 11     midazolam 5 mg/mL (VERSED) 5 mg/mL intranasal solution Apply 1 mL (5 mg) into one nostril as directed once as needed for seizures (1 squirt in JUST ONE nostril.  To be used for GTC greater than 3 to 4 minutes, focal seizures [smaller seizures] greater than 10 minutes, 3 or more GTC or focal seizures within 24 hours). 2 mL 2     multivitamin w/minerals (THERA-VIT-M) tablet Take 1 tablet by mouth daily.       mycophenolate (GENERIC EQUIVALENT) 250 MG capsule Take 3 capsules (750 mg) by mouth 2 times daily. 180 capsule 1     pantoprazole (PROTONIX) 40 MG EC tablet Take 1 tablet (40 mg) by mouth every other day. If no gerd, pt can stop       pentamidine (NEBUPENT) 300 MG neb solution Inhale 300 mg into the lungs every 28 days       psyllium (METAMUCIL) WAFR Take 2 Wafers by mouth daily 60 Wafer 2     traZODone (DESYREL) 50 MG tablet Take 0.5-1 tablets (25-50 mg) by mouth nightly as needed for sleep 30 tablet 2     ursodiol (ACTIGALL) 300 MG capsule Take 1 capsule (300 mg) by mouth or Feeding Tube 3 times daily 90 capsule 11     valGANciclovir (VALCYTE) 450 MG tablet Take 1 tablet (450 mg) by mouth daily 90 tablet 1     No current facility-administered medications for this visit.     Facility-Administered Medications Ordered in Other Visits   Medication Dose Route Frequency Provider Last Rate Last Admin     hyoscyamine (LEVSIN/SL) sublingual tablet 125 mcg  125 mcg Sublingual Q4H PRFarhan Hickman MD         The longitudinal plan of care  for the diagnosis(es)/condition(s) as documented were addressed during this visit. Due to the added complexity in care, I will continue to support [place] in the subsequent management and with ongoing continuity of care.      Again, thank you for allowing me to participate in the care of your patient.        Sincerely,        Jeannette Cervantes MD

## 2024-10-14 NOTE — NURSING NOTE
Current patient location: [address] [place] [id number]    Is the patient currently in the state of [place]? YES    Visit mode:VIDEO    If the visit is dropped, the patient can be reconnected by: VIDEO VISIT: Text to cell phone:   Telephone Information:   Mobile [phone]       Will anyone else be joining the visit? YES: How would they like to receive their invitation? Send to e-mail: PT will have  on camera  (If patient encounters technical issues they should call [phone])    Are changes needed to the allergy or medication list? No    Are refills needed on medications prescribed by this physician? Discuss with provider    Rooming Documentation:  Questionnaire(s) completed    Reason for visit: RECHECK    Levon HEARN

## 2024-10-14 NOTE — TELEPHONE ENCOUNTER
Per Dr coulter, patient to have US abdomen and will have patient to call to schedule. Order is placed.      Pt to start cellcept wean per dr coulter.   Pt taking 750 mg BID. Pt to decrease to cellcept 500 mg BID and repeat labs in a week.

## 2024-10-14 NOTE — PROGRESS NOTES
IBD CLINIC VISIT    CC/REFERRING MD:  Referred Self  REASON FOR CONSULTATION: Crohn's.     ASSESSMENT/PLAN:  59 year old female with Crohn's with perianal fistula s/p seton and cirrhosis c/b HCC.     1.  Crohn's disease with perianal fistula  Current Medications: Infliximab 10mg/kg every 8 weeks (resumed 9/2024 after transplant)  Current disease activity: HBI  5   Last endoscopic disease activity: 32/2024 - SES-CD of 0 (while off infliximab)     Infliximab history- started May 2019. Dose increase to 10 mg/kg every 8 weeks but had continued active disease, dose increase to 10 mg/kg every 4 weeks 11/2020. Trough level high at >34 in 3/2023 in the setting of new HCC. Dose reduced to 5 mg/kg every 4 weeks in 6/2023 to attempt to achieve a balance between IBD control and not too much immunosuppression in the setting of HCC.  Unfortunately, she switched to Medicare in late 2023 and has unable to obtain coverage for infliximab.  She has been off infliximab since 11/2023.  Medicare finally approved infliximab but at 5 mg/kg every 8 weeks which she resumed before transplant.     Now s/p liver transplant 6/2024 and doing well. Started back on infliximab 9/9/2024. We will need to make sure she isn't overly immunosuppressed between her transplant and IBD medications. Plan to check infliximab at next infusion. Her transplant meds will likely provide some control to her Crohn's so we may be able to deescalate her infliximab in future. If she develops recurrent infections, that would push us to deescalate or stop infliximab and see how she does on her transplant immunosuppression alone. No active perianal disease at this time (no seton in place). Plan for colonoscopy 1 year post transplant.     -- Continue infliximab   -- Infliximab level before next infusion  -- Colonoscopy 1 year after transplant (summer 2025)      2. Cirrhosis with HCC s/p liver transplant  s/p radioembolization by IR 3/2023. Liver transplant 6/2024, currently  on mycophenolate and cyclosporine (had seizure with tacrolimus). One admission for fever s/p transplant without finding of infection.   -- Follow-up with Dr. Cervantes in the liver clinic       4. IBD health maintenance  - saw [facility] pharmacy in April 2024.       Return to clinic in 6 months with Dr. Schilling. Earlier if significant findings on colonoscopy.       Patient discussed and seen with staff gastroenterologist Dr. Diaz who agrees with the plan.     Breezy Witt MD          IBD HISTORY  Age at diagnosis: 53 (4/2019)  Extent of disease: miguel angel-anal, anal  Disease phenotype: Miguel Angel-anal fistula  Miguel Angel-anal disease: Yes  Prior IBD surgeries: No. Seton placements  Prior IBD Medications: None    DRUG MONITORING  TPMT enzyme activity:     6-TGN/6-MMPN levels:    Biologic concentration:  10/2/2019: IFX 0.8, anti-IFX antibodies: 451 (Esoterix)  1/22/2020 infliximab level 2.3, no antibodies (this is an 8-week trough at 10 mg/kg)  3/4/21 IFX 25.1, no antibodies (4 week trough on 10mg/kg)  2/27/2023 IFX >34, no antibody (4 week trough on 10mg/kg)    sIBDQ:   IBDQ Score Date IBDQ - Total Score  (Higher score better)   6/13/2023   1:28 PM 45   5/31/2023   3:26 PM 51   9/30/2022   6:54 AM 49       HPI:   Here for follow up today, last saw Dr. Fuller/Joe 2/2024 and at that time was off of infliximab due to insurance changes. Colonoscopy 2/2024 showed Crohn's in remission (SES-CD of 0). Since then she was resumed on infliximab and then underwent liver transplant 6/22/2024. She was in and out of the hospital after transplant for fever (no source of infection) and seizure related to tacrolimus. On cyclosporine and mycophenoate for immunosuppression. Transplant surgery okay'ed her to resume IFX in August (on 10mg/kg every 8 weeks). Last infusion 9/9/24.     Notes stools are inconsistent, she does take metamucil wafers which help. Start off as formed and then become liquid. Good day is 2-3 stools, bad day is 6. She notes  yesterday was a bad day.     No mouth sores but has had joint pain knees and ankles. Joint pains got slightly better with infusion.        HBI:   Overall patient well being (prior day): 1 (below average)  Abdominal pain (prior day): 0 (None)  Number of liquid or soft stools (prior day): 4 (1 point per stool)  Abdominal mass on exam: 0 (None)  Complications (1 point for each):   Arthralgia    Remission <5  Mild activity 5-7  Moderate activity 8-16  Severe > 16    ROS:    Constitutional, HEENT, cardiovascular, pulmonary, GI, , musculoskeletal, neuro, skin, endocrine and psych systems are negative, except as otherwise noted.     PERTINENT PAST MEDICAL HISTORY:  Past Medical History:   Diagnosis Date    Alcohol abuse     Alcoholic cirrhosis of liver with ascites     Anal fistula     Atypical ductal hyperplasia of breast 09/10/2010    ERT not recommended -left - and flat epithelial atypia-scheduled for breast biopsy 9/17/2010     Bifid uvula     Cholelithiasis     Contact perianal dermatitis and other eczema     recurrent - clobetasol     Crohn disease     Fear of flying      - gets Ativan prn.     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 Varma     BIOPSY LIVER  2019    COLONOSCOPY  2006    COLONOSCOPY N/A 12/23/2014    Procedure: COMBINED COLONOSCOPY, SINGLE OR MULTIPLE BIOPSY/POLYPECTOMY BY BIOPSY;  Surgeon: Diane Fleming MD;  Location:  GI     COLONOSCOPY N/A 12/5/2019    Procedure: COLONOSCOPY, WITH POLYPECTOMY AND BIOPSY;  Surgeon: Farhan Schilling MD;  Location: [facility] GI    COLONOSCOPY N/A 2/27/2024    Procedure: COLONOSCOPY, WITH POLYPECTOMY AND BIOPSY;  Surgeon: Michelle Diaz MD;  Location: [facility] OR    ENDOSCOPIC RETROGRADE CHOLANGIOPANCREATOGRAM N/A 4/24/2020    Procedure: ENDOSCOPIC RETROGRADE CHOLANGIOPANCREATOGRAPHY WITH, sledge removal,sphincterotomy, stent in gallbladder and pancreatic duct stent, and balloon dilation;  Surgeon: Guru Isac Kraft MD;  Location: [facility] OR    ESOPHAGOSCOPY, GASTROSCOPY, DUODENOSCOPY (EGD), COMBINED N/A 12/5/2019    Procedure: ESOPHAGOGASTRODUODENOSCOPY (EGD);  Surgeon: Farhan Schilling MD;  Location: [facility] GI    ESOPHAGOSCOPY, GASTROSCOPY, DUODENOSCOPY (EGD), COMBINED N/A 5/11/2023    Procedure: Esophagoscopy, gastroscopy, duodenoscopy (EGD), combined;  Surgeon: Jeannette Cervantes MD;  Location: [facility] GI    EXAM UNDER ANESTHESIA ANUS N/A 3/28/2019    Procedure: EXAM UNDER ANESTHESIA ANUS;  Surgeon: Diane Fleming MD;  Location:  OR    EXAM UNDER ANESTHESIA ANUS N/A 2/26/2021    Procedure: EXAM UNDER ANESTHESIA OF ANUS, SETON PLACEMENT, EXCISION OF SKIN BRIDGE;  Surgeon: Avtar Nam MD;  [place] [facility] [place]    EXAM UNDER ANESTHESIA ANUS N/A 1/6/2023    Procedure: EXAM UNDER ANESTHESIA, ANUS, SETON EXCHANGE, partial fistulotomy;  Surgeon: Mary Dugan MD;  Location: [facility] [place]    HYSTERECTOMY, VAGINAL  2006    with Dr. Licha Zhou - with BSO for fibroids     IR GALLBLADDER DRAIN PLACEMENT  4/22/2020    IR SIRT (SELECTIVE INTERNAL RADIO THERAPY)  2/21/2023    IR VISCERAL ANGIOGRAM  2/21/2023    IR VISCERAL EMBOLIZATION  2/27/2023    LAPAROSCOPIC ABLATION LIVER TUMOR N/A 8/29/2023    Procedure: Diagnostic Laparoscopy, Laparoscopic microwave ablation of liver tumor intraoperative ultrasound of liver, lysis of adhesions 1 hour,;  Surgeon: Albino Saavedra MD;   Location: [facility] OR    OPEN REDUCTION INTERNAL FIXATION ANKLE Left at age 27    plates and screws removed at age 36    Pelviscopy with removal of bilateral hydrosalpinges.  04/15/2010    TRANSPLANT LIVER RECIPIENT  DONOR N/A 2024    Procedure: Transplant liver recipient  donor, with biliary stent placement;  Surgeon: Pravin Ball MD;  Location: [facility] OR    ZZC APPENDECTOMY  at age 14       PREVIOUS ENDOSCOPY:  2024 colonoscopy - SES-CD score 0, 2 polyps removed (1 in transverse colon and 1 in rectum -  returned as lymphoid aggregate and normal colon mucosa ).     3/7/19: Flex sig: Colon and rectum appeared normal. 2 large deep ulcers extending from the anal cnaal to left perianal area.     10/23/18: Colonoscopy: Endoscopically normal ileum.  Mild pan colonic congestion with contact friability thought to be related to portal hypertension and thrombocytopenia.  Prior anal verge ulcer is healed    14: Colonoscopy: Perianal skin tag noted.  Ileum normal, colon normal.    ALLERGIES:     Allergies   Allergen Reactions    Blood Transfusion Related (Informational Only) Other (See Comments)     Patient has a history of a clinically significant antibody against RBC antigens.  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 A delay in compatible RBCs may occur.    Dapsone Other (See Comments)     Symmetric drug-related intertriginous and flexural exanthema (SDRIFE)    Fish Oil      Redness and itching around eye area only - went away when fish oil capsules stopped     Metronidazole      pain/itching    Ppd [Tuberculin Purified Protein Derivative]     Sulfa Antibiotics      hives    Terbinafine And Related      Lamisil = mild urticarial reaction       PERTINENT MEDICATIONS:    Current Outpatient Medications:     acetaminophen (TYLENOL) 325 MG tablet, Take 2 tablets (650 mg) by mouth every 6 hours as needed for mild pain, Disp: 60 tablet, Rfl: 0    albuterol (PROVENTIL) (2.5 MG/3ML) 0.083% neb  solution, Take 1 vial (2.5 mg) by nebulization every 28 days, Disp: 90 mL, Rfl: 5    aspirin (ASA) 81 MG chewable tablet, Take 1 tablet (81 mg) by mouth or Feeding Tube daily, Disp: 30 tablet, Rfl: 11    Biotin 5000 MCG TABS, Take 1 tablet by mouth daily., Disp: , Rfl:     cycloSPORINE modified (GENERIC EQUIVALENT) 100 MG capsule, Take 1 capsule (100 mg) by mouth 2 times daily. Total dose 150mg BID, Disp: 60 capsule, Rfl: 11    cycloSPORINE modified (GENERIC EQUIVALENT) 25 MG capsule, Take 2 capsules (50 mg) by mouth every 12 hours. Total dose 150mg BID, Disp: 120 capsule, Rfl: 11    levETIRAcetam (KEPPRA) 1000 MG tablet, Take 1 tablet (1,000 mg) by mouth 3 times daily., Disp: 180 tablet, Rfl: 3    magnesium oxide (MAG-OX) 400 MG tablet, Take 1 tablet (400 mg) by mouth 2 times daily., Disp: 180 tablet, Rfl: 11    multivitamin w/minerals (THERA-VIT-M) tablet, Take 1 tablet by mouth daily., Disp: , Rfl:     mycophenolate (GENERIC EQUIVALENT) 250 MG capsule, Take 3 capsules (750 mg) by mouth 2 times daily., Disp: 180 capsule, Rfl: 1    pantoprazole (PROTONIX) 40 MG EC tablet, Take 1 tablet (40 mg) by mouth every other day. If no gerd, pt can stop, Disp: , Rfl:     pentamidine (NEBUPENT) 300 MG neb solution, Inhale 300 mg into the lungs every 28 days, Disp: , Rfl:     psyllium (METAMUCIL) WAFR, Take 2 Wafers by mouth daily, Disp: 60 Wafer, Rfl: 2    traZODone (DESYREL) 50 MG tablet, Take 0.5-1 tablets (25-50 mg) by mouth nightly as needed for sleep, Disp: 30 tablet, Rfl: 2    ursodiol (ACTIGALL) 300 MG capsule, Take 1 capsule (300 mg) by mouth or Feeding Tube 3 times daily, Disp: 90 capsule, Rfl: 11    valGANciclovir (VALCYTE) 450 MG tablet, Take 1 tablet (450 mg) by mouth daily, Disp: 90 tablet, Rfl: 1    emollient (VANICREAM) external cream, Apply topically every 6 hours as needed for other (Dryness) (Patient taking differently: Apply topically every 6 hours as needed for other (Dryness). Or other products), Disp: 453  g, Rfl: 1    midazolam 5 mg/mL (VERSED) 5 mg/mL intranasal solution, Apply 1 mL (5 mg) into one nostril as directed once as needed for seizures (1 squirt in JUST ONE nostril.  To be used for GTC greater than 3 to 4 minutes, focal seizures [smaller seizures] greater than 10 minutes, 3 or more GTC or focal seizures within 24 hours)., Disp: 2 mL, Rfl: 2  No current facility-administered medications for this visit.    Facility-Administered Medications Ordered in Other Visits:     hyoscyamine (LEVSIN/SL) sublingual tablet 125 mcg, 125 mcg, Sublingual, Q4H PRN, Farhan Schilling MD    SOCIAL HISTORY:  Social History     Socioeconomic History    Marital status:      Spouse name: Albino    Number of children: 3    Years of education: 14    Highest education level: Not on file   Occupational History    Occupation: unemployed   Tobacco Use    Smoking status: Never     Passive exposure: Never    Smokeless tobacco: Never   Vaping Use    Vaping status: Never Used   Substance and Sexual Activity    Alcohol use: Not Currently    Drug use: No    Sexual activity: Yes     Partners: Male     Birth control/protection: Post-menopausal     Comment: husb had vasectomy   Other Topics Concern     Service No    Blood Transfusions No    Caffeine Concern No     Comment: rarely drinks caffeine    Occupational Exposure Not Asked    Hobby Hazards Not Asked    Sleep Concern Not Asked    Stress Concern Not Asked    Weight Concern Not Asked    Special Diet Not Asked    Back Care Not Asked    Exercise Yes     Comment: does a lot of walking - 4x/week    Bike Helmet Not Asked    Seat Belt Yes     Comment: always    Self-Exams Yes     Comment: SBE encouraged monthly    Parent/sibling w/ CABG, MI or angioplasty before 65F 55M? Yes   Social History Narrative    calcium - drinks 5-6 large glasses skim milk/day    flex sig/colonoscopy -at age 49    sun precautions - discussed    mammogram - needs every 2 years in her 30's, then yearly from  then on    Td booster - 9/99 and 4/27/2010    pneumovax -at age 59    DEXA -when perimenopausal    stool hemoccults - every year after age 40    ASA- start at age 39    mulvitamin - encouraged     Social Determinants of Health     Financial Resource Strain: Low Risk  (2/4/2024)    Financial Resource Strain     Within the past 12 months, have you or your family members you live with been unable to get utilities (heat, electricity) when it was really needed?: No   Food Insecurity: Low Risk  (2/4/2024)    Food Insecurity     Within the past 12 months, did you worry that your food would run out before you got money to buy more?: No     Within the past 12 months, did the food you bought just not last and you didn t have money to get more?: No   Transportation Needs: Low Risk  (2/4/2024)    Transportation Needs     Within the past 12 months, has lack of transportation kept you from medical appointments, getting your medicines, non-medical meetings or appointments, work, or from getting things that you need?: No   Physical Activity: Unknown (9/15/2020)    Exercise Vital Sign     Days of Exercise per Week: 0 days     Minutes of Exercise per Session: Not on file   Stress: Stress Concern Present (9/15/2020)    [employer] - Occupational Stress Questionnaire     Feeling of Stress : Rather much   Social Connections: Unknown (9/15/2020)    Social Connection and Isolation Panel [NHANES]     Frequency of Communication with Friends and Family: More than three times a week     Frequency of Social Gatherings with Friends and Family: More than three times a week     Attends [employer] Services: Not on file     Active Member of Clubs or Organizations: Not on file     Attends Club or Organization Meetings: Not on file     Marital Status: Not on file   Interpersonal Safety: Low Risk  (2/7/2024)    Interpersonal Safety     Do you feel physically and emotionally safe where you currently live?: Yes     Within the  past 12 months, have you been hit, slapped, kicked or otherwise physically hurt by someone?: No     Within the past 12 months, have you been humiliated or emotionally abused in other ways by your partner or ex-partner?: No   Housing Stability: Low Risk  (2/4/2024)    Housing Stability     Do you have housing? : Yes     Are you worried about losing your housing?: No       FAMILY HISTORY:  Family History   Problem Relation Age of Onset    Breast Cancer Mother     Gastrointestinal Disease Mother     Heart Disease Father 57    Hypertension Maternal Grandmother     Substance Abuse Maternal Grandfather     Diabetes Maternal Grandfather     Diabetes Paternal Grandmother     Heart Disease Paternal Grandfather     Cancer Sister     Skin Cancer Sister     Substance Abuse Maternal Uncle     Substance Abuse Maternal Uncle     Suicide Niece     Suicide Niece     Anesthesia Reaction No family hx of     Thrombosis No family hx of        Past/family/social history reviewed and no changes    PHYSICAL EXAMINATION:  Constitutional: aaox3, cooperative, pleasant, not dyspneic/diaphoretic, no acute distress  Vitals reviewed: /84 (BP Location: Left arm, Patient Position: Sitting, Cuff Size: Adult Large)   Pulse 80   Wt 83.5 kg (184 lb)   LMP 05/31/2006   SpO2 100%   BMI 30.62 kg/m    Wt:   Wt Readings from Last 2 Encounters:   10/14/24 83.5 kg (184 lb)   09/23/24 82.6 kg (182 lb)      Constitutional - general appearance is well and in no acute distress. Body habitus normal  Eyes - No redness or discharge  Respiratory - No cough, unlabored breathing  Musculoskeletal - range of motion intact: Neck and arms  Abdomen: soft nontender nondistended   Neurological - No focal deficits  Psychiatric - No anxiety, alert & oriented

## 2024-10-14 NOTE — PROGRESS NOTES
"Virtual Visit Details    Type of service:  Video Visit   Video Start Time: {video visit start/end time for provider to select:710059}  Video End Time:{video visit start/end time for provider to select:177000}    Originating Location (pt. Location): {video visit patient location:197662::\"Home\"}  {PROVIDER LOCATION On-site should be selected for visits conducted from your clinic location or adjoining [facility] hospital, academic office, or other nearby [facility] building. Off-site should be selected for all other provider locations, including home:841636}  Distant Location (provider location):  {virtual location provider:902759}  Platform used for Video Visit: {Virtual Visit Platforms:980094::\"Solvesting\"}  "

## 2024-10-15 ENCOUNTER — DOCUMENTATION ONLY (OUTPATIENT)
Dept: GASTROENTEROLOGY | Facility: CLINIC | Age: 59
End: 2024-10-15
Payer: MEDICARE

## 2024-10-15 DIAGNOSIS — Z94.4 LIVER REPLACED BY TRANSPLANT (H): Primary | ICD-10-CM

## 2024-10-15 NOTE — PROGRESS NOTES
Prom[employer] form filled out and scanned into patient chart. Lab order is in. Added to patient list. Copy also sent via fax to [phone].    SAUMYA Hunter Tonya, RN Lacher, Stacy, LPN  Can you please fill out and scan into the patients chart a prometheus lab order form for this patient? It is a PredictrPK level for maintenance infusions. Drawn 20 days after last maintenance dose.     Medication: infliximab (REMICADE) i     Last infusion  -- Date: 2024  -- Total dose in m mg  -- Weight in k.4 kg  -- Frequency: every 8 weeks     Provider: Shakir

## 2024-10-17 ENCOUNTER — MYC MEDICAL ADVICE (OUTPATIENT)
Dept: FAMILY MEDICINE | Facility: CLINIC | Age: 59
End: 2024-10-17

## 2024-10-17 ENCOUNTER — LAB (OUTPATIENT)
Dept: LAB | Facility: CLINIC | Age: 59
End: 2024-10-17
Payer: MEDICARE

## 2024-10-17 DIAGNOSIS — R53.81 PHYSICAL DECONDITIONING: Primary | ICD-10-CM

## 2024-10-17 DIAGNOSIS — Z94.4 S/P LIVER TRANSPLANT (H): ICD-10-CM

## 2024-10-17 DIAGNOSIS — F10.11 ALCOHOL ABUSE, IN REMISSION: ICD-10-CM

## 2024-10-17 DIAGNOSIS — K70.31 ALCOHOLIC CIRRHOSIS OF LIVER WITH ASCITES (H): ICD-10-CM

## 2024-10-17 DIAGNOSIS — Z94.4 LIVER REPLACED BY TRANSPLANT (H): ICD-10-CM

## 2024-10-17 LAB
ALBUMIN SERPL BCG-MCNC: 4 G/DL (ref 3.5–5.2)
ALP SERPL-CCNC: 170 U/L (ref 40–150)
ALT SERPL W P-5'-P-CCNC: 24 U/L (ref 0–50)
ANION GAP SERPL CALCULATED.3IONS-SCNC: 9 MMOL/L (ref 7–15)
AST SERPL W P-5'-P-CCNC: 32 U/L (ref 0–45)
BILIRUB DIRECT SERPL-MCNC: <0.2 MG/DL (ref 0–0.3)
BILIRUB SERPL-MCNC: 0.8 MG/DL
BUN SERPL-MCNC: 19.7 MG/DL (ref 8–23)
CALCIUM SERPL-MCNC: 9.6 MG/DL (ref 8.8–10.4)
CHLORIDE SERPL-SCNC: 106 MMOL/L (ref 98–107)
CREAT SERPL-MCNC: 0.8 MG/DL (ref 0.51–0.95)
CYCLOSPORINE BLD LC/MS/MS-MCNC: 140 UG/L (ref 50–400)
EGFRCR SERPLBLD CKD-EPI 2021: 84 ML/MIN/1.73M2
ERYTHROCYTE [DISTWIDTH] IN BLOOD BY AUTOMATED COUNT: 14.8 % (ref 10–15)
GLUCOSE SERPL-MCNC: 99 MG/DL (ref 70–99)
HCO3 SERPL-SCNC: 25 MMOL/L (ref 22–29)
HCT VFR BLD AUTO: 35.6 % (ref 35–47)
HGB BLD-MCNC: 11.9 G/DL (ref 11.7–15.7)
MAGNESIUM SERPL-MCNC: 1.8 MG/DL (ref 1.7–2.3)
MCH RBC QN AUTO: 29.3 PG (ref 26.5–33)
MCHC RBC AUTO-ENTMCNC: 33.4 G/DL (ref 31.5–36.5)
MCV RBC AUTO: 88 FL (ref 78–100)
PHOSPHATE SERPL-MCNC: 4.5 MG/DL (ref 2.5–4.5)
PLATELET # BLD AUTO: 187 10E3/UL (ref 150–450)
POTASSIUM SERPL-SCNC: 4.8 MMOL/L (ref 3.4–5.3)
PROT SERPL-MCNC: 6.9 G/DL (ref 6.4–8.3)
RBC # BLD AUTO: 4.06 10E6/UL (ref 3.8–5.2)
SODIUM SERPL-SCNC: 140 MMOL/L (ref 135–145)
TME LAST DOSE: NORMAL H
TME LAST DOSE: NORMAL H
WBC # BLD AUTO: 2.3 10E3/UL (ref 4–11)

## 2024-10-17 PROCEDURE — 85027 COMPLETE CBC AUTOMATED: CPT

## 2024-10-17 PROCEDURE — 82248 BILIRUBIN DIRECT: CPT

## 2024-10-17 PROCEDURE — 99000 SPECIMEN HANDLING OFFICE-LAB: CPT

## 2024-10-17 PROCEDURE — 80158 DRUG ASSAY CYCLOSPORINE: CPT

## 2024-10-17 PROCEDURE — 36415 COLL VENOUS BLD VENIPUNCTURE: CPT

## 2024-10-17 PROCEDURE — 83735 ASSAY OF MAGNESIUM: CPT

## 2024-10-17 PROCEDURE — G0480 DRUG TEST DEF 1-7 CLASSES: HCPCS | Mod: 90

## 2024-10-17 PROCEDURE — 84100 ASSAY OF PHOSPHORUS: CPT

## 2024-10-17 PROCEDURE — 80053 COMPREHEN METABOLIC PANEL: CPT

## 2024-10-17 NOTE — TELEPHONE ENCOUNTER
Patient requesting referral for outpatient physical therapy, per patient [facility] told her they were waiting for referral from PCP.     Per 10/3/24 telephone encounter from Transplant Clinic:  Patient Call: General  Route to LPN     Reason for call: [facility] nurse looking to get a referral for patient to have outpatient physical therapy      Call back needed? Yes     Return Call Needed  Same as documented in contacts section  When to return call?: Same day: Route High Priority     Encounter notes stated that patient was planning to get this referral from PCP. Order pended, routed to provider to review.     Sarah Anguiano RN  [facility]

## 2024-10-18 DIAGNOSIS — Z94.4 HISTORY OF LIVER TRANSPLANT (H): ICD-10-CM

## 2024-10-18 DIAGNOSIS — Z94.4 S/P LIVER TRANSPLANT (H): ICD-10-CM

## 2024-10-18 RX ORDER — CYCLOSPORINE 100 MG/1
100 CAPSULE, LIQUID FILLED ORAL 2 TIMES DAILY
Qty: 60 CAPSULE | Refills: 11 | Status: SHIPPED | OUTPATIENT
Start: 2024-10-18 | End: 2024-11-01

## 2024-10-18 RX ORDER — MYCOPHENOLATE MOFETIL 250 MG/1
250 CAPSULE ORAL 2 TIMES DAILY
Qty: 180 CAPSULE | Refills: 1 | Status: SHIPPED | OUTPATIENT
Start: 2024-10-18 | End: 2024-10-28

## 2024-10-18 RX ORDER — CYCLOSPORINE 25 MG/1
75 CAPSULE, LIQUID FILLED ORAL EVERY 12 HOURS
Qty: 180 CAPSULE | Refills: 11 | Status: SHIPPED | OUTPATIENT
Start: 2024-10-18 | End: 2024-11-01

## 2024-10-18 NOTE — TELEPHONE ENCOUNTER
ISSUE:   CSA level 140 on 10/17, goal 150, dose 150 mg BID.    PLAN:   Call Patient and confirm this was an accurate 12-hour trough.   Verify Cyclosporine dose 150 mg BID AND cellcept 500 mg BID  Confirm no new medications or or missed doses.   Confirm no new illness / infection / diarrhea.   If accurate trough and accurate dose, increase Cyclosporine dose to 175 mg BID AND decrease cellcept to 250 mg BID    Is this more than a 50% increase or decrease in current IS dose: Yes  If YES, justification: weaning cellcept    Repeat labs in 1 weeks.  *If > 50% change in immunosuppression dose, repeat labs in 1 week.   Zayra Paulino, KP    OUTCOME:   Spoke with Rafi, they confirm accurate trough level and current dose 150 mg BID.   Rafi confirmed dose change to 175 mg BID.  rafi agreed to repeat labs in 1 weeks.   Orders sent to [facility] pharmacy for dose change and lab for repeat labs.   Rafi voiced understanding of plan.   Also instructed Rafi to have pt reduce cellcept to 250mg BID.     Michelle Starks LPN

## 2024-10-24 ENCOUNTER — LAB (OUTPATIENT)
Dept: LAB | Facility: CLINIC | Age: 59
End: 2024-10-24
Payer: MEDICARE

## 2024-10-24 DIAGNOSIS — Z94.4 LIVER REPLACED BY TRANSPLANT (H): ICD-10-CM

## 2024-10-24 LAB
ALBUMIN SERPL BCG-MCNC: 4.1 G/DL (ref 3.5–5.2)
ALP SERPL-CCNC: 174 U/L (ref 40–150)
ALT SERPL W P-5'-P-CCNC: 24 U/L (ref 0–50)
ANION GAP SERPL CALCULATED.3IONS-SCNC: 10 MMOL/L (ref 7–15)
AST SERPL W P-5'-P-CCNC: 31 U/L (ref 0–45)
BILIRUB DIRECT SERPL-MCNC: <0.2 MG/DL (ref 0–0.3)
BILIRUB SERPL-MCNC: 0.7 MG/DL
BUN SERPL-MCNC: 35.4 MG/DL (ref 8–23)
CALCIUM SERPL-MCNC: 9.5 MG/DL (ref 8.8–10.4)
CHLORIDE SERPL-SCNC: 105 MMOL/L (ref 98–107)
CREAT SERPL-MCNC: 0.92 MG/DL (ref 0.51–0.95)
CYCLOSPORINE BLD LC/MS/MS-MCNC: 198 UG/L (ref 50–400)
EGFRCR SERPLBLD CKD-EPI 2021: 71 ML/MIN/1.73M2
ERYTHROCYTE [DISTWIDTH] IN BLOOD BY AUTOMATED COUNT: 14.7 % (ref 10–15)
GLUCOSE SERPL-MCNC: 96 MG/DL (ref 70–99)
HCO3 SERPL-SCNC: 25 MMOL/L (ref 22–29)
HCT VFR BLD AUTO: 36.3 % (ref 35–47)
HGB BLD-MCNC: 11.5 G/DL (ref 11.7–15.7)
MAGNESIUM SERPL-MCNC: 1.9 MG/DL (ref 1.7–2.3)
MCH RBC QN AUTO: 27.3 PG (ref 26.5–33)
MCHC RBC AUTO-ENTMCNC: 31.7 G/DL (ref 31.5–36.5)
MCV RBC AUTO: 86 FL (ref 78–100)
PHOSPHATE SERPL-MCNC: 4.8 MG/DL (ref 2.5–4.5)
PLATELET # BLD AUTO: 210 10E3/UL (ref 150–450)
POTASSIUM SERPL-SCNC: 4.7 MMOL/L (ref 3.4–5.3)
PROT SERPL-MCNC: 7 G/DL (ref 6.4–8.3)
RBC # BLD AUTO: 4.22 10E6/UL (ref 3.8–5.2)
SODIUM SERPL-SCNC: 140 MMOL/L (ref 135–145)
TME LAST DOSE: NORMAL H
TME LAST DOSE: NORMAL H
WBC # BLD AUTO: 2.3 10E3/UL (ref 4–11)

## 2024-10-24 PROCEDURE — 80053 COMPREHEN METABOLIC PANEL: CPT

## 2024-10-24 PROCEDURE — 80158 DRUG ASSAY CYCLOSPORINE: CPT

## 2024-10-24 PROCEDURE — 36415 COLL VENOUS BLD VENIPUNCTURE: CPT

## 2024-10-24 PROCEDURE — 83735 ASSAY OF MAGNESIUM: CPT

## 2024-10-24 PROCEDURE — 82248 BILIRUBIN DIRECT: CPT

## 2024-10-24 PROCEDURE — 84100 ASSAY OF PHOSPHORUS: CPT

## 2024-10-24 PROCEDURE — 85027 COMPLETE CBC AUTOMATED: CPT

## 2024-10-27 RX ORDER — EPINEPHRINE 1 MG/ML
0.3 INJECTION, SOLUTION, CONCENTRATE INTRAVENOUS EVERY 5 MIN PRN
Status: CANCELLED | OUTPATIENT
Start: 2024-10-27

## 2024-10-27 RX ORDER — DIPHENHYDRAMINE HYDROCHLORIDE 50 MG/ML
25 INJECTION INTRAMUSCULAR; INTRAVENOUS
Status: CANCELLED
Start: 2024-10-27

## 2024-10-27 RX ORDER — METHYLPREDNISOLONE SODIUM SUCCINATE 40 MG/ML
40 INJECTION INTRAMUSCULAR; INTRAVENOUS
Status: CANCELLED
Start: 2024-10-27

## 2024-10-27 RX ORDER — MEPERIDINE HYDROCHLORIDE 25 MG/ML
25 INJECTION INTRAMUSCULAR; INTRAVENOUS; SUBCUTANEOUS
Status: CANCELLED | OUTPATIENT
Start: 2024-10-27

## 2024-10-27 RX ORDER — DIPHENHYDRAMINE HYDROCHLORIDE 50 MG/ML
25 INJECTION INTRAMUSCULAR; INTRAVENOUS
Start: 2024-10-27

## 2024-10-27 RX ORDER — DIPHENHYDRAMINE HYDROCHLORIDE 50 MG/ML
50 INJECTION INTRAMUSCULAR; INTRAVENOUS
Status: CANCELLED
Start: 2024-10-27

## 2024-10-27 RX ORDER — EPINEPHRINE 1 MG/ML
0.3 INJECTION, SOLUTION, CONCENTRATE INTRAVENOUS EVERY 5 MIN PRN
OUTPATIENT
Start: 2024-10-27

## 2024-10-27 RX ORDER — METHYLPREDNISOLONE SODIUM SUCCINATE 40 MG/ML
40 INJECTION INTRAMUSCULAR; INTRAVENOUS
Start: 2024-10-27

## 2024-10-27 RX ORDER — MEPERIDINE HYDROCHLORIDE 25 MG/ML
25 INJECTION INTRAMUSCULAR; INTRAVENOUS; SUBCUTANEOUS
OUTPATIENT
Start: 2024-10-27

## 2024-10-27 RX ORDER — ALBUTEROL SULFATE 0.83 MG/ML
2.5 SOLUTION RESPIRATORY (INHALATION)
Status: CANCELLED | OUTPATIENT
Start: 2024-10-27

## 2024-10-27 RX ORDER — ALBUTEROL SULFATE 0.83 MG/ML
2.5 SOLUTION RESPIRATORY (INHALATION)
OUTPATIENT
Start: 2024-10-27

## 2024-10-27 RX ORDER — ALBUTEROL SULFATE 90 UG/1
1-2 INHALANT RESPIRATORY (INHALATION)
Status: CANCELLED
Start: 2024-10-27

## 2024-10-27 RX ORDER — ALBUTEROL SULFATE 90 UG/1
1-2 INHALANT RESPIRATORY (INHALATION)
Start: 2024-10-27

## 2024-10-27 RX ORDER — DIPHENHYDRAMINE HYDROCHLORIDE 50 MG/ML
50 INJECTION INTRAMUSCULAR; INTRAVENOUS
Start: 2024-10-27

## 2024-10-28 ENCOUNTER — TELEPHONE (OUTPATIENT)
Dept: TRANSPLANT | Facility: CLINIC | Age: 59
End: 2024-10-28
Payer: MEDICARE

## 2024-10-28 DIAGNOSIS — Z94.4 S/P LIVER TRANSPLANT (H): ICD-10-CM

## 2024-10-28 RX ORDER — MYCOPHENOLATE MOFETIL 250 MG/1
250 CAPSULE ORAL DAILY
COMMUNITY
Start: 2024-10-28 | End: 2024-11-01

## 2024-10-28 NOTE — TELEPHONE ENCOUNTER
Pt taking cellcept 250 mg BID. Pt to decrease to cellcept 250 mg daily and repeat labs in a week.     Cyclosporine within goal. No dose adjustment.

## 2024-10-29 ENCOUNTER — THERAPY VISIT (OUTPATIENT)
Dept: PHYSICAL THERAPY | Facility: CLINIC | Age: 59
End: 2024-10-29
Attending: NURSE PRACTITIONER
Payer: MEDICARE

## 2024-10-29 DIAGNOSIS — Z94.4 S/P LIVER TRANSPLANT (H): ICD-10-CM

## 2024-10-29 DIAGNOSIS — R53.81 PHYSICAL DECONDITIONING: ICD-10-CM

## 2024-10-29 PROCEDURE — 97110 THERAPEUTIC EXERCISES: CPT | Mod: GP | Performed by: PHYSICAL THERAPIST

## 2024-10-29 PROCEDURE — 97161 PT EVAL LOW COMPLEX 20 MIN: CPT | Mod: GP | Performed by: PHYSICAL THERAPIST

## 2024-10-29 NOTE — PROGRESS NOTES
"PHYSICAL THERAPY EVALUATION  Type of Visit: Evaluation              Subjective         Presenting condition or subjective complaint: (Patient-Rptd) Post surgery rehab  Per chart review: Patient has a history of \"HCC in the setting of DUNN/AUD s/p DCD liver transplant w/ biliary stent 6/22/24, osteopenia, LE cellulitis, Crohn's disease on Infliximab, obesity. Postop course complicated by multiple readmissions. 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 Readmitted 7/9/24-7/11/24 after witnessed grand mal seizure at home. She was discharged on Keppra 750mg BID. Readmitted 7/12/24 with seizures.\"     Patient notes she discharged from home health PT at the end of September - took a break from PT which overall went well. She feels generally like she needs to get stronger. Even before her transplant she had some difficulty with stepping up on a higher surface. She notes some instability and un sureness with gravel surfaces, gets tired on hills.     She is doing most of her normal daily activities - has a riding  but difficulty with turning and steering. She notes a split level home, has some trouble with lifting and carrying laundry up and down the stairs. She has two grandkids 2 and 3 years old - can lift them or walk with them by her side. Patient notes she could walk about 10 minutes on a flat surface before needing a rest break - this might be pushing it. She can do grocery shopping with the cart without much rest. Would like to work on stamina.     Date of onset: 06/22/24    Relevant medical history:     Dates & types of surgery: (Patient-Rptd) In my chart    Prior diagnostic imaging/testing results:       Prior therapy history for the same diagnosis, illness or injury: (Patient-Rptd) No      Prior Level of Function  Transfers: Independent  Ambulation: Independent  ADL: Independent  IADL: Childcare, Driving, Finances, " Housekeeping, Laundry, Meal preparation, Medication management, Work, Yard work    Living Environment  Social support: (Patient-Rptd) With a significant other or spouse   Type of home: (Patient-Rptd) House; Multi-level, split level  Stairs to enter the home: (Patient-Rptd) Yes (Patient-Rptd) 1 Is there a railing: (Patient-Rptd) Yes     Ramp: (Patient-Rptd) No   Stairs inside the home: (Patient-Rptd) Yes (Patient-Rptd) 6 Is there a railing: (Patient-Rptd) Yes     Help at home: (Patient-Rptd) Medication and/or finances  Equipment owned:       Employment: (Patient-Rptd) No, retired/disability  Hobbies/Interests:  Walking, playing with grandkids, golfing    Patient goals for therapy: (Patient-Rptd) Strengthen    Pain assessment: Pain denied. Notes some abdominal discomfort and  has a CT scan this week. Notes some Chron's related pain in the ankles, knees and hips.      Objective   Vitals Signs  Blood Pressure: 140/102  Vital Signs Comments: Seated, resting, left arm, manual cuff  Cognitive Status Examination  Patient notes she had seizures and memory problems after surgery, this has improved. She still notes some memory lapses.     TRANSFERS: Able to stand with no UE assist    GAIT:   Ambulates with mildly decreased gait speed, mild path deviation, increased instability with head turns and eyes closed.     SPECIAL TESTS  Functional Gait Assessment (FGA) TOTAL SCORE: (MAXIMUM SCORE 30): 18    10 Meter Walk Test (Comfortable)  No AD  0.98 m/s   10 Meter Walk Test (Fast)  No AD  1.35 m/s   6 Minute Walk Test (6MWT)   Estimates she can walk for 10 minutes before needing a rest due to dyspnea        [place] Balance Scale (BBS)     5 Times Sit-to-Stand (5TSTS)  13.06 sec     Dynamic Gait Index (DGI)     Timed Up and Go (TUG) - sec    Single Leg Stance Right (sec)    Single Leg Stance Left (sec)    Modified CTSIB Conditions (sec) Cond 1: 30  Cond 2: 30  Cond 4: 30  Cond 5 : 2   Romberg  (sec)    Sharpened Romberg (sec)    30  Second Sit to Stand (reps/height) 11 reps, B knee soreness mild   ABC scale   84.1% confidence   Stair negotiation to fatigue on a set of 4 steps 8 times with 1 UE support, 5/10 OMNI, feels some lower back spasm with SOB      She feels she could get on and off the ground - not demonstrated during evaluation.     SENSATION: No numbness or tingling per patient report    She notes some dizziness with the sensation of a hot flash, not related to movement. Denies syncope or near syncope. Denies vertigo or the sensation of things moving.     Assessment & Plan   CLINICAL IMPRESSIONS  Medical Diagnosis: Physical deconditioning (R53.81, S/P liver transplant (H) (Z94.4    Treatment Diagnosis: Sensory selection and weighting deficit, force production deficit   Impression/Assessment: Patient is a 59 year old female with strength, balance, and endurance complaints.  The following significant findings have been identified: Decreased strength, Impaired balance, Impaired gait, Impaired muscle performance, Decreased activity tolerance, and Disequilibrium . These impairments interfere with their ability to perform self care tasks, recreational activities, and community mobility as compared to previous level of function.     Clinical Decision Making (Complexity):  Clinical Presentation: Stable/Uncomplicated  Clinical Presentation Rationale: based on medical and personal factors listed in PT evaluation  Clinical Decision Making (Complexity): Low complexity    PLAN OF CARE  Treatment Interventions:  Interventions: Gait Training, Neuromuscular Re-education, Therapeutic Activity, Therapeutic Exercise, Self-Care/Home Management, Canalith Repositioning    Long Term Goals     PT Goal 1  Goal Identifier: HEP  Goal Description: Patient will demonstrate understanding and compliance to her HEP at least 3x per week for continued wellbeing upon discharge from skilled physical therapy.  Rationale: to maximize safety and independence with  performance of ADLs and functional tasks;to maximize safety and independence within the home;to maximize safety and independence within the community;to maximize safety and independence with self cares  Target Date: 01/26/25  PT Goal 2  Goal Identifier: 30 sec STS  Goal Description: Patient will complete 13 reps during the 30 second STS test to demonstrate improved functional LE strength and decreased risk for falls for safety and independence with transfers.  Rationale: to maximize safety and independence with performance of ADLs and functional tasks;to maximize safety and independence within the home;to maximize safety and independence within the community;to maximize safety and independence with self cares  Goal Progress: Eval: 11 reps, B knee soreness mild  Target Date: 01/26/25  PT Goal 3  Goal Identifier: FGA  Goal Description: Patient will complete the FGA with a score of 23/30 to demonstrate improved balance and decreased risk for falls.  Rationale: to maximize safety and independence with performance of ADLs and functional tasks;to maximize safety and independence within the home;to maximize safety and independence within the community;to maximize safety and independence with self cares  Goal Progress: Eval: 18/30  Target Date: 01/26/25  PT Goal 4  Goal Identifier: Gait speed  Goal Description: Patient will ambulate at a normal speed of >1.15 m/s with the least restrictive AD or no AD to demonstrate improved step length and gait speed for increased safety and independence with crossing the street.  Rationale: to maximize safety and independence within the home;to maximize safety and independence within the community;to maximize safety and independence with transportation  Goal Progress: 20 foot distance with no AD; Eval: Normal: 0.98 m/s, Fast: 1.35 m/s  Target Date: 01/26/25  PT Goal 5  Goal Identifier: Exertion  Goal Description: Patient will complete at least 12 flights of 4 steps to  Rationale: to  maximize safety and independence with performance of ADLs and functional tasks;to maximize safety and independence within the home;to maximize safety and independence within the community;to maximize safety and independence with self cares  Goal Progress: Eval: 8 times with 1 UE support, 5/10 OMNI, feels some lower back spasm with SOB  Target Date: 01/26/25  PT Goal 6  Goal Identifier: ABC  Goal Description: Patient will complete the ABC Scale with a score of >/=90% to demonstrate improved balance confidence for safety with ADLs and functional mobility.  Rationale: to maximize safety and independence with performance of ADLs and functional tasks;to maximize safety and independence within the home;to maximize safety and independence within the community;to maximize safety and independence with self cares  Goal Progress: Eval: 84.1%  Target Date: 01/26/25      Frequency of Treatment: 1x per week, decreasing in frequency as indicated  Duration of Treatment: 90 days    Recommended Referrals to Other Professionals:   Education Assessment:   Learner/Method: Patient;Demonstration;Pictures/Video  Education Comments: Exam findings, POC, HEP    Risks and benefits of evaluation/treatment have been explained.   Patient/Family/caregiver agrees with Plan of Care.     Evaluation Time:     PT Kenny, Low Complexity Minutes (29861): 35       Signing Clinician: Rocio Henning, PT        [facility]                                                                                   OUTPATIENT PHYSICAL THERAPY      PLAN OF TREATMENT FOR OUTPATIENT REHABILITATION   Patient's Last Name, First Name, Abiola Diaz YOB: 1965   Provider's Name   [facility]   Medical Record No.  [id number]     Onset Date: 06/22/24  Start of Care Date: 10/29/24     Medical Diagnosis:  Physical deconditioning (R53.81, S/P liver transplant (H) (Z94.4      PT Treatment Diagnosis:   Sensory selection and weighting deficit, force production deficit Plan of Treatment  Frequency/Duration: 1x per week, decreasing in frequency as indicated/ 90 days    Certification date from 10/29/24 to 01/26/25         See note for plan of treatment details and functional goals     Rocio Henning, PT                         I CERTIFY THE NEED FOR THESE SERVICES FURNISHED UNDER        THIS PLAN OF TREATMENT AND WHILE UNDER MY CARE     (Physician attestation of this document indicates review and certification of the therapy plan).              Referring Provider:  Linda Macdonald    Initial Assessment  See Epic Evaluation- Start of Care Date: 10/29/24

## 2024-10-31 ENCOUNTER — LAB (OUTPATIENT)
Dept: LAB | Facility: CLINIC | Age: 59
End: 2024-10-31
Payer: MEDICARE

## 2024-10-31 DIAGNOSIS — Z94.4 LIVER REPLACED BY TRANSPLANT (H): ICD-10-CM

## 2024-10-31 DIAGNOSIS — Z94.4 S/P LIVER TRANSPLANT (H): ICD-10-CM

## 2024-10-31 LAB
CYCLOSPORINE BLD LC/MS/MS-MCNC: 238 UG/L (ref 50–400)
ERYTHROCYTE [DISTWIDTH] IN BLOOD BY AUTOMATED COUNT: 14.6 % (ref 10–15)
HCT VFR BLD AUTO: 36.3 % (ref 35–47)
HGB BLD-MCNC: 12 G/DL (ref 11.7–15.7)
MCH RBC QN AUTO: 27.8 PG (ref 26.5–33)
MCHC RBC AUTO-ENTMCNC: 33.1 G/DL (ref 31.5–36.5)
MCV RBC AUTO: 84 FL (ref 78–100)
PLATELET # BLD AUTO: 194 10E3/UL (ref 150–450)
RBC # BLD AUTO: 4.31 10E6/UL (ref 3.8–5.2)
TME LAST DOSE: NORMAL H
TME LAST DOSE: NORMAL H
WBC # BLD AUTO: 2.5 10E3/UL (ref 4–11)

## 2024-10-31 PROCEDURE — 85027 COMPLETE CBC AUTOMATED: CPT

## 2024-10-31 PROCEDURE — 84100 ASSAY OF PHOSPHORUS: CPT

## 2024-10-31 PROCEDURE — 36415 COLL VENOUS BLD VENIPUNCTURE: CPT

## 2024-10-31 PROCEDURE — 82248 BILIRUBIN DIRECT: CPT

## 2024-10-31 PROCEDURE — 83735 ASSAY OF MAGNESIUM: CPT

## 2024-10-31 PROCEDURE — 80053 COMPREHEN METABOLIC PANEL: CPT

## 2024-10-31 PROCEDURE — 80158 DRUG ASSAY CYCLOSPORINE: CPT

## 2024-11-01 ENCOUNTER — TELEPHONE (OUTPATIENT)
Dept: TRANSPLANT | Facility: CLINIC | Age: 59
End: 2024-11-01
Payer: MEDICARE

## 2024-11-01 DIAGNOSIS — Z94.4 HISTORY OF LIVER TRANSPLANT (H): ICD-10-CM

## 2024-11-01 DIAGNOSIS — Z94.4 S/P LIVER TRANSPLANT (H): ICD-10-CM

## 2024-11-01 LAB
ALBUMIN SERPL BCG-MCNC: 4.2 G/DL (ref 3.5–5.2)
ALP SERPL-CCNC: 197 U/L (ref 40–150)
ALT SERPL W P-5'-P-CCNC: 32 U/L (ref 0–50)
ANION GAP SERPL CALCULATED.3IONS-SCNC: 15 MMOL/L (ref 7–15)
AST SERPL W P-5'-P-CCNC: 37 U/L (ref 0–45)
BILIRUB DIRECT SERPL-MCNC: 0.21 MG/DL (ref 0–0.3)
BILIRUB SERPL-MCNC: 1.2 MG/DL
BUN SERPL-MCNC: 24.6 MG/DL (ref 8–23)
CALCIUM SERPL-MCNC: 9.9 MG/DL (ref 8.8–10.4)
CHLORIDE SERPL-SCNC: 104 MMOL/L (ref 98–107)
CREAT SERPL-MCNC: 0.89 MG/DL (ref 0.51–0.95)
EGFRCR SERPLBLD CKD-EPI 2021: 74 ML/MIN/1.73M2
GLUCOSE SERPL-MCNC: 100 MG/DL (ref 70–99)
HCO3 SERPL-SCNC: 22 MMOL/L (ref 22–29)
MAGNESIUM SERPL-MCNC: 1.9 MG/DL (ref 1.7–2.3)
PHOSPHATE SERPL-MCNC: 5.1 MG/DL (ref 2.5–4.5)
POTASSIUM SERPL-SCNC: 5.2 MMOL/L (ref 3.4–5.3)
PROT SERPL-MCNC: 7.3 G/DL (ref 6.4–8.3)
SODIUM SERPL-SCNC: 141 MMOL/L (ref 135–145)

## 2024-11-01 RX ORDER — CYCLOSPORINE 25 MG/1
50 CAPSULE, LIQUID FILLED ORAL EVERY 12 HOURS
Qty: 180 CAPSULE | Refills: 11 | Status: SHIPPED | OUTPATIENT
Start: 2024-11-01

## 2024-11-01 RX ORDER — CYCLOSPORINE 100 MG/1
100 CAPSULE, LIQUID FILLED ORAL 2 TIMES DAILY
Qty: 60 CAPSULE | Refills: 11 | Status: SHIPPED | OUTPATIENT
Start: 2024-11-01

## 2024-11-01 NOTE — TELEPHONE ENCOUNTER
ISSUE:   Cyclosporine level 238 on 10/31, goal 150-200, dose 175 mg BID.    PLAN:   Call Patient and confirm this was an accurate 12-hour trough.   Verify CSA dose 175 mg BID and cellcept 25o mg daily.  Confirm no new medications or or missed doses.   Confirm no new illness / infection / diarrhea.   If accurate trough and accurate dose, decrease Cyclosporine dose to 150 mg BID AND STOP cellcept    Is this more than a 50% increase or decrease in current IS dose: No  If YES, justification:     Repeat labs in 1 weeks.  *If > 50% change in immunosuppression dose, repeat labs in 1 week.   Zayra Paulino RN    OUTCOME:   Spoke with Patient, they confirm accurate trough level and current dose 175 mg BID.   Patient confirmed dose change to 150 mg BID.  Patient agreed to repeat labs in 1 weeks.   Orders sent to preferred pharmacy for dose change and lab for repeat labs.   Patient voiced understanding of plan.

## 2024-11-01 NOTE — TELEPHONE ENCOUNTER
Pt calls to state that she took it upon herself to change the CSA to 150mg for this morning. Now pt would like direction going forward.

## 2024-11-01 NOTE — TELEPHONE ENCOUNTER
Spoke with [place]. Reviewed that the drug levels usually come in after 5pm and are always addressed the next business day and should not self address.  [place] was agreeable and apologized.    Pt repeated dose change to  mg BID and stop cellcept and repeat labs in a week.

## 2024-11-04 ENCOUNTER — INFUSION THERAPY VISIT (OUTPATIENT)
Dept: INFUSION THERAPY | Facility: CLINIC | Age: 59
End: 2024-11-04
Attending: SURGERY
Payer: MEDICARE

## 2024-11-04 VITALS
HEART RATE: 91 BPM | SYSTOLIC BLOOD PRESSURE: 131 MMHG | DIASTOLIC BLOOD PRESSURE: 85 MMHG | RESPIRATION RATE: 18 BRPM | TEMPERATURE: 98.6 F | OXYGEN SATURATION: 100 %

## 2024-11-04 DIAGNOSIS — K50.119 CROHN'S DISEASE OF PERIANAL REGION WITH COMPLICATION (H): Primary | ICD-10-CM

## 2024-11-04 LAB
ALBUMIN SERPL BCG-MCNC: 4.5 G/DL (ref 3.5–5.2)
ALP SERPL-CCNC: 211 U/L (ref 40–150)
ALT SERPL W P-5'-P-CCNC: 31 U/L (ref 0–50)
AST SERPL W P-5'-P-CCNC: 34 U/L (ref 0–45)
BASOPHILS # BLD AUTO: 0 10E3/UL (ref 0–0.2)
BASOPHILS NFR BLD AUTO: 1 %
BILIRUB DIRECT SERPL-MCNC: 0.22 MG/DL (ref 0–0.3)
BILIRUB SERPL-MCNC: 1.2 MG/DL
CRP SERPL-MCNC: 11.08 MG/L
EOSINOPHIL # BLD AUTO: 0.1 10E3/UL (ref 0–0.7)
EOSINOPHIL NFR BLD AUTO: 4 %
ERYTHROCYTE [DISTWIDTH] IN BLOOD BY AUTOMATED COUNT: 14.7 % (ref 10–15)
HCT VFR BLD AUTO: 37 % (ref 35–47)
HGB BLD-MCNC: 12.4 G/DL (ref 11.7–15.7)
IMM GRANULOCYTES # BLD: 0 10E3/UL
IMM GRANULOCYTES NFR BLD: 1 %
LYMPHOCYTES # BLD AUTO: 0.8 10E3/UL (ref 0.8–5.3)
LYMPHOCYTES NFR BLD AUTO: 20 %
MCH RBC QN AUTO: 28.4 PG (ref 26.5–33)
MCHC RBC AUTO-ENTMCNC: 33.5 G/DL (ref 31.5–36.5)
MCV RBC AUTO: 85 FL (ref 78–100)
MONOCYTES # BLD AUTO: 0.3 10E3/UL (ref 0–1.3)
MONOCYTES NFR BLD AUTO: 8 %
NEUTROPHILS # BLD AUTO: 2.7 10E3/UL (ref 1.6–8.3)
NEUTROPHILS NFR BLD AUTO: 68 %
NRBC # BLD AUTO: 0 10E3/UL
NRBC BLD AUTO-RTO: 0 /100
PLATELET # BLD AUTO: 225 10E3/UL (ref 150–450)
PROT SERPL-MCNC: 8 G/DL (ref 6.4–8.3)
RBC # BLD AUTO: 4.36 10E6/UL (ref 3.8–5.2)
WBC # BLD AUTO: 4 10E3/UL (ref 4–11)

## 2024-11-04 PROCEDURE — 250N000011 HC RX IP 250 OP 636: Mod: JZ | Performed by: INTERNAL MEDICINE

## 2024-11-04 PROCEDURE — 258N000003 HC RX IP 258 OP 636: Performed by: INTERNAL MEDICINE

## 2024-11-04 PROCEDURE — 36415 COLL VENOUS BLD VENIPUNCTURE: CPT

## 2024-11-04 PROCEDURE — 96413 CHEMO IV INFUSION 1 HR: CPT

## 2024-11-04 PROCEDURE — 85004 AUTOMATED DIFF WBC COUNT: CPT | Performed by: INTERNAL MEDICINE

## 2024-11-04 PROCEDURE — 86140 C-REACTIVE PROTEIN: CPT | Performed by: INTERNAL MEDICINE

## 2024-11-04 PROCEDURE — 82040 ASSAY OF SERUM ALBUMIN: CPT | Performed by: INTERNAL MEDICINE

## 2024-11-04 PROCEDURE — 36415 COLL VENOUS BLD VENIPUNCTURE: CPT | Performed by: INTERNAL MEDICINE

## 2024-11-04 PROCEDURE — 81599 UNLISTED MAAA: CPT

## 2024-11-04 RX ORDER — DIPHENHYDRAMINE HYDROCHLORIDE 50 MG/ML
25 INJECTION INTRAMUSCULAR; INTRAVENOUS
Start: 2024-11-13

## 2024-11-04 RX ORDER — METHYLPREDNISOLONE SODIUM SUCCINATE 40 MG/ML
40 INJECTION INTRAMUSCULAR; INTRAVENOUS
Start: 2024-11-13

## 2024-11-04 RX ORDER — EPINEPHRINE 1 MG/ML
0.3 INJECTION, SOLUTION INTRAMUSCULAR; SUBCUTANEOUS EVERY 5 MIN PRN
OUTPATIENT
Start: 2024-11-13

## 2024-11-04 RX ORDER — ALBUTEROL SULFATE 90 UG/1
1-2 INHALANT RESPIRATORY (INHALATION)
Start: 2024-11-13

## 2024-11-04 RX ORDER — ACETAMINOPHEN 325 MG/1
650 TABLET ORAL ONCE
Start: 2024-11-13 | End: 2024-11-13

## 2024-11-04 RX ORDER — MEPERIDINE HYDROCHLORIDE 25 MG/ML
25 INJECTION INTRAMUSCULAR; INTRAVENOUS; SUBCUTANEOUS
OUTPATIENT
Start: 2024-11-13

## 2024-11-04 RX ORDER — ALBUTEROL SULFATE 0.83 MG/ML
2.5 SOLUTION RESPIRATORY (INHALATION)
OUTPATIENT
Start: 2024-11-13

## 2024-11-04 RX ORDER — DIPHENHYDRAMINE HCL 25 MG
25 CAPSULE ORAL ONCE
Start: 2024-11-13 | End: 2024-11-13

## 2024-11-04 RX ORDER — DIPHENHYDRAMINE HYDROCHLORIDE 50 MG/ML
50 INJECTION INTRAMUSCULAR; INTRAVENOUS
Start: 2024-11-13

## 2024-11-04 RX ADMIN — INFLIXIMAB 800 MG: 100 INJECTION, POWDER, LYOPHILIZED, FOR SOLUTION INTRAVENOUS at 12:29

## 2024-11-04 NOTE — PROGRESS NOTES
Infusion Nursing Note:  Abiola Matute presents today for labs/infliximab.    Patient seen by provider today: No   present during visit today: Not Applicable.    Note: N/A.      Intravenous Access:  Labs drawn without difficulty.  Peripheral IV placed.    Treatment Conditions:  Biological Infusion Checklist:  ~~~ NOTE: If the patient answers yes to any of the questions below, hold the infusion and contact ordering provider or on-call provider.    Have you recently had an elevated temperature, fever, chills, productive cough, coughing for 3 weeks or longer or hemoptysis,  abnormal vital signs, night sweats,  chest pain or have you noticed a decrease in your appetite, unexplained weight loss or fatigue? No  Do you have any open wounds or new incisions? No  Do you have any upcoming hospitalizations or surgeries? Does not include esophagogastroduodenoscopy, colonoscopy, endoscopic retrograde cholangiopancreatography (ERCP), endoscopic ultrasound (EUS), dental procedures or joint aspiration/steroid injections No  Do you currently have any signs of illness or infection or are you on any antibiotics? No  Have you had any new, sudden or worsening abdominal pain? No  Have you or anyone in your household received a live vaccination in the past 4 weeks? Please note: No live vaccines while on biologic/chemotherapy until 6 months after the last treatment. Patient can receive the flu vaccine (shot only), pneumovax and the Covid vaccine. It is optimal for the patient to get these vaccines mid cycle, but they can be given at any time as long as it is not on the day of the infusion. No  Have you recently been diagnosed with any new nervous system diseases (ie. Multiple sclerosis, Guillain [place], seizures, neurological changes) or cancer diagnosis? Are you on any form of radiation or chemotherapy? No  Are you pregnant or breast feeding or do you have plans of pregnancy in the future? No  Have you been having any signs of  worsening depression or suicidal ideations?  (benlysta only) No  Have there been any other new onset medical symptoms? No  Have you had any new blood clots? (IVIG only) No      Post Infusion Assessment:  Patient tolerated infusion without incident.  Blood return noted pre and post infusion.  Site patent and intact, free from redness, edema or discomfort.  No evidence of extravasations.  Access discontinued per protocol.       Discharge Plan:   Discharge instructions reviewed with: Patient.  Patient and/or family verbalized understanding of discharge instructions and all questions answered.  Patient discharged in stable condition accompanied by: self.  Departure Mode: Ambulatory.      Nereida Peres RN

## 2024-11-04 NOTE — PROGRESS NOTES
Placed call to [facility] infusion [place] with reminder to draw patient's lab prior to infusion: [id number].

## 2024-11-05 ENCOUNTER — OFFICE VISIT (OUTPATIENT)
Dept: FAMILY MEDICINE | Facility: CLINIC | Age: 59
End: 2024-11-05
Payer: MEDICARE

## 2024-11-05 ENCOUNTER — TELEPHONE (OUTPATIENT)
Dept: FAMILY MEDICINE | Facility: CLINIC | Age: 59
End: 2024-11-05

## 2024-11-05 VITALS
WEIGHT: 185 LBS | DIASTOLIC BLOOD PRESSURE: 80 MMHG | TEMPERATURE: 97.8 F | OXYGEN SATURATION: 100 % | HEIGHT: 65 IN | SYSTOLIC BLOOD PRESSURE: 130 MMHG | RESPIRATION RATE: 12 BRPM | BODY MASS INDEX: 30.82 KG/M2 | HEART RATE: 83 BPM

## 2024-11-05 DIAGNOSIS — L03.213 PRESEPTAL CELLULITIS OF RIGHT EYE: Primary | ICD-10-CM

## 2024-11-05 LAB
Lab: NORMAL
PERFORMING LABORATORY: NORMAL
SPECIMEN STATUS: NORMAL
TEST NAME: NORMAL

## 2024-11-05 PROCEDURE — 99213 OFFICE O/P EST LOW 20 MIN: CPT

## 2024-11-05 PROCEDURE — G2211 COMPLEX E/M VISIT ADD ON: HCPCS

## 2024-11-05 ASSESSMENT — ENCOUNTER SYMPTOMS: EYE PAIN: 1

## 2024-11-05 ASSESSMENT — PAIN SCALES - GENERAL: PAINLEVEL_OUTOF10: MILD PAIN (2)

## 2024-11-05 NOTE — TELEPHONE ENCOUNTER
Reason for Call:  Appointment Request    Patient requesting this type of appt:  Swelling in her right eye     Requested provider:  Open to anyone     Reason patient unable to be scheduled: Not within requested timeframe    When does patient want to be seen/preferred time: Same day    Comments: Right eye swelling     Could we send this information to you in [facility] or would you prefer to receive a phone call?:   Patient would prefer a phone call   Okay to leave a detailed message?: Yes at Cell number on file:    Telephone Information:   Mobile [phone]       Call taken on 11/5/2024 at 7:41 AM by Gregoria Casper

## 2024-11-05 NOTE — PROGRESS NOTES
"  Assessment & Plan     Preseptal cellulitis of right eye  Painless EOM make orbital cellulitis less likely. Will treat outpatient with Augmentin for 7 days. Printed information on AVS about preseptal cellulitis and at-home cares. Discussed with patient that if it ever becomes painful to move her eyes in any direction she should report to the ED for a higher level of care and potentially IV antibiotics.   - amoxicillin-clavulanate (AUGMENTIN) 875-125 MG tablet  Dispense: 14 tablet; Refill: 0    Patient should follow up in 1 week if symptoms do not improve or sooner for new or worsening symptoms. All questions answered to patient's satisfaction. Warning signs of when to seek emergency care were discussed.    Evelyn Washington PA-C    BMI  Estimated body mass index is 30.79 kg/m  as calculated from the following:    Height as of this encounter: 1.651 m (5' 5\").    Weight as of this encounter: 83.9 kg (185 lb).   Weight management plan: Patient was referred to their PCP to discuss a diet and exercise plan.      Art Coleman is a 59 year old, presenting for the following health issues:  Eye Problem        11/5/2024     8:51 AM   Additional Questions   Roomed by SARAH CLEVELAND CMA   Accompanied by SELF     Via the Health Maintenance questionnaire, the patient has reported the following services have been completed -Mammogram: [facility] 2023-06-10, this information has been sent to the abstraction team.  History of Present Illness       Reason for visit:  Swollen red right eye  Symptom onset:  1-3 days ago   She is taking medications regularly.     History:    Trauma: no known   Does it feel like you have something in your eye: No  Do you wear contacts: No     If worn, contact lens type: N/A  Accompanying Signs & Symptoms:   Eye pain: just discomfort                  Photophobia: YES  Redness: only on lid   Drainage: YES  Swelling: YES  Visual changesYES due to lid swelling   Fever: No  Nasal congestion: No  Therapies tried: " "warm cloth       Started Sunday morning. Felt like it was a stye forming. Yesterday it was worse but not terrible. This morning she woke up with mattering in her eye. Yellow color. No pain with eye movements, but some discomfort due to swelling of upper eyelid.           Review of Systems  Constitutional, neuro, ENT, endocrine, pulmonary, cardiac, gastrointestinal, genitourinary, musculoskeletal, integument and psychiatric systems are negative, except as otherwise noted.      Objective    /80   Pulse 83   Temp 97.8  F (36.6  C) (Tympanic)   Resp 12   Ht 1.651 m (5' 5\")   Wt 83.9 kg (185 lb)   LMP 05/31/2006   SpO2 100%   BMI 30.79 kg/m    Body mass index is 30.79 kg/m .  Physical Exam   GENERAL: alert and no distress  EYES: PERRL, EOMI and painless, conjunctivae and sclerae normal, eyelids- periorbital edema OD, conjunctiva/corneas- clear colored discharge present right  NECK: no adenopathy, no asymmetry, masses, or scars  RESP: lungs clear to auscultation - no rales, rhonchi or wheezes  CV: regular rate and rhythm, normal S1 S2, no S3 or S4, no murmur, click or rub, no peripheral edema  MS: no gross musculoskeletal defects noted, no edema  PSYCH: mentation appears normal, affect normal/bright      Signed Electronically by: Evelyn Washington PA-C    "

## 2024-11-07 ENCOUNTER — LAB (OUTPATIENT)
Dept: LAB | Facility: CLINIC | Age: 59
End: 2024-11-07
Payer: MEDICARE

## 2024-11-07 ENCOUNTER — TELEPHONE (OUTPATIENT)
Dept: TRANSPLANT | Facility: CLINIC | Age: 59
End: 2024-11-07

## 2024-11-07 ENCOUNTER — ALLIED HEALTH/NURSE VISIT (OUTPATIENT)
Dept: FAMILY MEDICINE | Facility: CLINIC | Age: 59
End: 2024-11-07
Payer: MEDICARE

## 2024-11-07 ENCOUNTER — TELEPHONE (OUTPATIENT)
Dept: FAMILY MEDICINE | Facility: CLINIC | Age: 59
End: 2024-11-07

## 2024-11-07 DIAGNOSIS — K50.113 CROHN'S DISEASE OF LARGE INTESTINE WITH FISTULA (H): ICD-10-CM

## 2024-11-07 DIAGNOSIS — Z23 HIGH PRIORITY FOR 2019-NCOV VACCINE: ICD-10-CM

## 2024-11-07 DIAGNOSIS — Z94.4 LIVER REPLACED BY TRANSPLANT (H): ICD-10-CM

## 2024-11-07 DIAGNOSIS — Z23 NEED FOR PROPHYLACTIC VACCINATION AND INOCULATION AGAINST INFLUENZA: Primary | ICD-10-CM

## 2024-11-07 DIAGNOSIS — Z94.4 S/P LIVER TRANSPLANT (H): ICD-10-CM

## 2024-11-07 LAB
ALBUMIN SERPL BCG-MCNC: 4.2 G/DL (ref 3.5–5.2)
ALP SERPL-CCNC: 197 U/L (ref 40–150)
ALT SERPL W P-5'-P-CCNC: 27 U/L (ref 0–50)
ANION GAP SERPL CALCULATED.3IONS-SCNC: 10 MMOL/L (ref 7–15)
AST SERPL W P-5'-P-CCNC: 33 U/L (ref 0–45)
BASOPHILS # BLD AUTO: 0 10E3/UL (ref 0–0.2)
BASOPHILS NFR BLD AUTO: 1 %
BILIRUB DIRECT SERPL-MCNC: 0.23 MG/DL (ref 0–0.3)
BILIRUB SERPL-MCNC: 1 MG/DL
BUN SERPL-MCNC: 24.6 MG/DL (ref 8–23)
CALCIUM SERPL-MCNC: 9.8 MG/DL (ref 8.8–10.4)
CHLORIDE SERPL-SCNC: 103 MMOL/L (ref 98–107)
CREAT SERPL-MCNC: 0.97 MG/DL (ref 0.51–0.95)
CYCLOSPORINE BLD LC/MS/MS-MCNC: 192 UG/L (ref 50–400)
EGFRCR SERPLBLD CKD-EPI 2021: 67 ML/MIN/1.73M2
EOSINOPHIL # BLD AUTO: 0.2 10E3/UL (ref 0–0.7)
EOSINOPHIL NFR BLD AUTO: 4 %
ERYTHROCYTE [DISTWIDTH] IN BLOOD BY AUTOMATED COUNT: 15 % (ref 10–15)
GLUCOSE SERPL-MCNC: 99 MG/DL (ref 70–99)
HCO3 SERPL-SCNC: 26 MMOL/L (ref 22–29)
HCT VFR BLD AUTO: 35.3 % (ref 35–47)
HGB BLD-MCNC: 11.8 G/DL (ref 11.7–15.7)
IMM GRANULOCYTES # BLD: 0 10E3/UL
IMM GRANULOCYTES NFR BLD: 0 %
LYMPHOCYTES # BLD AUTO: 0.8 10E3/UL (ref 0.8–5.3)
LYMPHOCYTES NFR BLD AUTO: 15 %
MAGNESIUM SERPL-MCNC: 1.9 MG/DL (ref 1.7–2.3)
MCH RBC QN AUTO: 28.3 PG (ref 26.5–33)
MCHC RBC AUTO-ENTMCNC: 33.4 G/DL (ref 31.5–36.5)
MCV RBC AUTO: 85 FL (ref 78–100)
MONOCYTES # BLD AUTO: 0.4 10E3/UL (ref 0–1.3)
MONOCYTES NFR BLD AUTO: 7 %
NEUTROPHILS # BLD AUTO: 3.6 10E3/UL (ref 1.6–8.3)
NEUTROPHILS NFR BLD AUTO: 73 %
PHOSPHATE SERPL-MCNC: 4.6 MG/DL (ref 2.5–4.5)
PLATELET # BLD AUTO: 187 10E3/UL (ref 150–450)
POTASSIUM SERPL-SCNC: 5.1 MMOL/L (ref 3.4–5.3)
PROT SERPL-MCNC: 7.5 G/DL (ref 6.4–8.3)
RBC # BLD AUTO: 4.17 10E6/UL (ref 3.8–5.2)
SODIUM SERPL-SCNC: 139 MMOL/L (ref 135–145)
TME LAST DOSE: NORMAL H
TME LAST DOSE: NORMAL H
WBC # BLD AUTO: 4.9 10E3/UL (ref 4–11)

## 2024-11-07 PROCEDURE — 84100 ASSAY OF PHOSPHORUS: CPT

## 2024-11-07 PROCEDURE — 99207 PR NO CHARGE LOS: CPT

## 2024-11-07 PROCEDURE — 83735 ASSAY OF MAGNESIUM: CPT

## 2024-11-07 PROCEDURE — 36415 COLL VENOUS BLD VENIPUNCTURE: CPT

## 2024-11-07 PROCEDURE — 80053 COMPREHEN METABOLIC PANEL: CPT

## 2024-11-07 PROCEDURE — 80158 DRUG ASSAY CYCLOSPORINE: CPT

## 2024-11-07 PROCEDURE — 85025 COMPLETE CBC W/AUTO DIFF WBC: CPT

## 2024-11-07 PROCEDURE — 82248 BILIRUBIN DIRECT: CPT

## 2024-11-07 NOTE — TELEPHONE ENCOUNTER
Pt here for flu and covid vaccines, checked yes to 5 of 14 contraindications for covid vaccine:    2. Allergies to meds:   Sulfa - hives  Flagyl - pain/itching   Dapsone - SDRIFE    4. Daily ASA     7. Med for Crohn's and anti-rejection meds    8. Seizures - caused by an anti rejection med which has been changed, on Keppra as precaution last seizure was July 2024    10. Pt has received a blood transfusion in June 2024    Huddled with Dr. Victor who advises deferment to transplant. Called transplant coordinator, Zayra who states ok per transplant but will need to check with neurology about seizures. Writer advised Pt we would call her to reschedule when we hear back about covid vaccine. OK for Flu vaccine.     Pt also reports RSV was recommended but insurance not covering. Zayra stated that is ok and if not approved does not have to get. Pt would like writer to ask PCP if she orders it could we get a PA for it?         Awaiting call back from Zayra re: seizures and covid vaccine. PCP please advise about other contraindications and RSV PA.      Mildred Rowland RN [facility]

## 2024-11-07 NOTE — TELEPHONE ENCOUNTER
Provider Call: General  Route to LPN    Reason for call: Call from Clinic nurse- pt there for vaccines- check some of the boxes yes on the forms and the Dr wants them to check with us if it is OK for the patient to receive vaccines pt there now for vaccines     Call back needed? Yes    Return Call Needed  Same as documented in contacts section  When to return call?: Same day: Route [facility]

## 2024-11-08 ENCOUNTER — ANCILLARY PROCEDURE (OUTPATIENT)
Dept: ULTRASOUND IMAGING | Facility: CLINIC | Age: 59
End: 2024-11-08
Attending: INTERNAL MEDICINE
Payer: MEDICARE

## 2024-11-08 DIAGNOSIS — Z94.4 S/P LIVER TRANSPLANT (H): ICD-10-CM

## 2024-11-08 LAB
INFLIXIMAB AB SERPL IA-MCNC: <3.1 U/ML
INFLIXIMAB SERPL-MCNC: 9.1 UG/ML

## 2024-11-08 PROCEDURE — 93975 VASCULAR STUDY: CPT | Mod: GC | Performed by: RADIOLOGY

## 2024-11-11 NOTE — TELEPHONE ENCOUNTER
Per DR Agudelo patient from transplant standpoint is clear after finishes abx for COVID vaccine..  Per neurology ok for pt to have covid vaccine from seizure standpoint.      Pt's PCP to review any other contraindications.    Triage [place] [phone]

## 2024-11-12 DIAGNOSIS — Z94.4 LIVER REPLACED BY TRANSPLANT (H): ICD-10-CM

## 2024-11-12 DIAGNOSIS — K50.113 CROHN'S DISEASE OF LARGE INTESTINE WITH FISTULA (H): Primary | ICD-10-CM

## 2024-11-12 RX ORDER — ALBUTEROL SULFATE 0.83 MG/ML
2.5 SOLUTION RESPIRATORY (INHALATION)
OUTPATIENT
Start: 2024-11-25

## 2024-11-12 RX ORDER — PENTAMIDINE ISETHIONATE 300 MG/300MG
300 INHALANT RESPIRATORY (INHALATION)
Status: DISCONTINUED | OUTPATIENT
Start: 2024-11-12 | End: 2024-11-12 | Stop reason: HOSPADM

## 2024-11-12 RX ORDER — DIPHENHYDRAMINE HYDROCHLORIDE 50 MG/ML
50 INJECTION INTRAMUSCULAR; INTRAVENOUS
Start: 2024-11-25

## 2024-11-12 RX ORDER — DIPHENHYDRAMINE HYDROCHLORIDE 50 MG/ML
25 INJECTION INTRAMUSCULAR; INTRAVENOUS
Start: 2024-11-25

## 2024-11-12 RX ORDER — EPINEPHRINE 1 MG/ML
0.3 INJECTION, SOLUTION, CONCENTRATE INTRAVENOUS EVERY 5 MIN PRN
OUTPATIENT
Start: 2024-11-25

## 2024-11-12 RX ORDER — PENTAMIDINE ISETHIONATE 300 MG/300MG
300 INHALANT RESPIRATORY (INHALATION)
Start: 2024-11-25

## 2024-11-12 RX ORDER — ALBUTEROL SULFATE 0.83 MG/ML
2.5 SOLUTION RESPIRATORY (INHALATION) ONCE
Start: 2024-11-25 | End: 2024-11-25

## 2024-11-12 RX ORDER — ALBUTEROL SULFATE 90 UG/1
1-2 INHALANT RESPIRATORY (INHALATION)
Start: 2024-11-25

## 2024-11-12 RX ORDER — MEPERIDINE HYDROCHLORIDE 25 MG/ML
25 INJECTION INTRAMUSCULAR; INTRAVENOUS; SUBCUTANEOUS
OUTPATIENT
Start: 2024-11-25

## 2024-11-12 RX ORDER — METHYLPREDNISOLONE SODIUM SUCCINATE 40 MG/ML
40 INJECTION INTRAMUSCULAR; INTRAVENOUS
Start: 2024-11-25

## 2024-11-12 RX ADMIN — PENTAMIDINE ISETHIONATE 300 MG: 300 INHALANT RESPIRATORY (INHALATION) at 12:40

## 2024-11-12 NOTE — TELEPHONE ENCOUNTER
"Writer received message from Vy Monk from the transplant clinic.     \"From Dr. Cervantes, the patient is clear to received vaccines after she finishes her antibiotics\"    Augmentin should be done on 11/12/24.     Awaiting response from PCP re: RSV PA.    Mildred Rowland RN [place] Triage        "

## 2024-11-12 NOTE — PROGRESS NOTES
Abiola Matute  Patient was seen today for a Pentamidine nebulizer tx ordered by LUCÍA Morel PA-C.    Patient was first given 4 puffs Albuterol MDI, after which Pentamidine 300 mg (Lot # [id number]) mixed with 6cc Sterile H20 was administered through a filtered nebulizer.    Pre-treatment: SpO2 = 100%   HR = 77   BBS = Clear   Post-treatment: SpO2 = 100%  HR = 85  BBS = Clear    No adverse side effects noted by the patient.    This service today was provided under the direct supervision of Dr. Rodriguez, who was available if needed.     Procedure was completed by KARIN BURRELL.

## 2024-11-12 NOTE — TELEPHONE ENCOUNTER
Attempt # 1    Called # [phone]     Left a non detailed VM to call back at [phone] and ask for any available Triage Nurse.    Can schedule vaccines in clinic, RSV to be given at pharmacy. Pharmacy does not need order.     JALEN MEZA RN on 11/12/2024 at 1:56 PM   [facility]

## 2024-11-12 NOTE — TELEPHONE ENCOUNTER
Recommend that RSV order goes through pharmacy as typically RSV is required to be given through the pharmacy with Medicare insurance.  Then PA could be initiated.      - Mariela, CNP

## 2024-11-12 NOTE — TELEPHONE ENCOUNTER
Messaged Mildred Rowland that it is ok for [place] to received the vaccine after she finishes antibiotics.

## 2024-11-13 ENCOUNTER — TELEPHONE (OUTPATIENT)
Dept: TRANSPLANT | Facility: CLINIC | Age: 59
End: 2024-11-13
Payer: MEDICARE

## 2024-11-13 DIAGNOSIS — Z94.4 S/P LIVER TRANSPLANT (H): Primary | ICD-10-CM

## 2024-11-13 NOTE — TELEPHONE ENCOUNTER
Spoke with [place].  She is aware and she did state she was anxious about seeing the results before a plan was set in place. She understands it happens, but she did state she prefers to have the imaging results held for future.  MRI order held for 7 days per pt request.     [place] will call to make MRI appt  Her cellulitis improved    [place] will call with any changes or concerns.

## 2024-11-13 NOTE — TELEPHONE ENCOUNTER
Reviewed US with Dr coulter. Pt to have abdomen MRI with and without contrast to follow up on newly visualized focal 2.3 x 3.1 x 3.0 cm heterogenously echogenic avascular observation.    Attempted to reach patient, but no answer. Left message to return call to discuss.

## 2024-11-15 NOTE — TELEPHONE ENCOUNTER
Called and spoke with patient.     Advised of providers recommendation. States someone already talked to her about all this. Is coming in on 11/19 for vaccines.     JALEN MEZA RN on 11/15/2024 at 12:03 PM   [employer]

## 2024-11-18 ENCOUNTER — THERAPY VISIT (OUTPATIENT)
Dept: PHYSICAL THERAPY | Facility: CLINIC | Age: 59
End: 2024-11-18
Attending: NURSE PRACTITIONER
Payer: MEDICARE

## 2024-11-18 DIAGNOSIS — R53.81 PHYSICAL DECONDITIONING: Primary | ICD-10-CM

## 2024-11-18 PROCEDURE — 97110 THERAPEUTIC EXERCISES: CPT | Mod: GP | Performed by: PHYSICAL THERAPIST

## 2024-11-18 PROCEDURE — 97112 NEUROMUSCULAR REEDUCATION: CPT | Mod: GP | Performed by: PHYSICAL THERAPIST

## 2024-11-19 ENCOUNTER — ALLIED HEALTH/NURSE VISIT (OUTPATIENT)
Dept: FAMILY MEDICINE | Facility: CLINIC | Age: 59
End: 2024-11-19
Payer: MEDICARE

## 2024-11-19 DIAGNOSIS — Z23 ENCOUNTER FOR IMMUNIZATION: Primary | ICD-10-CM

## 2024-11-19 PROCEDURE — 99207 PR NO CHARGE NURSE ONLY: CPT

## 2024-11-19 PROCEDURE — G0008 ADMIN INFLUENZA VIRUS VAC: HCPCS

## 2024-11-19 PROCEDURE — 90480 ADMN SARSCOV2 VAC 1/ONLY CMP: CPT

## 2024-11-19 PROCEDURE — 90673 RIV3 VACCINE NO PRESERV IM: CPT

## 2024-11-19 PROCEDURE — 91320 SARSCV2 VAC 30MCG TRS-SUC IM: CPT

## 2024-11-19 NOTE — PROGRESS NOTES
Prior to immunization administration, verified patients identity using patient s name and date of birth. Please see Immunization Activity for additional information.     Is the patient's temperature normal (100.5 or less)? Yes     Patient MEETS CRITERIA. PROCEED with vaccine administration.      Patient instructed to remain in clinic for 15 minutes afterwards, and to report any adverse reactions.      Link to Ancillary Visit Immunization Standing Orders SmartSet     Screening performed by Sarah Ayala CMA on 11/19/2024 at 1:37 PM.

## 2024-11-21 ENCOUNTER — LAB (OUTPATIENT)
Dept: LAB | Facility: CLINIC | Age: 59
End: 2024-11-21
Payer: MEDICARE

## 2024-11-21 DIAGNOSIS — Z94.4 S/P LIVER TRANSPLANT (H): ICD-10-CM

## 2024-11-21 DIAGNOSIS — Z94.4 LIVER REPLACED BY TRANSPLANT (H): ICD-10-CM

## 2024-11-21 LAB
ALBUMIN SERPL BCG-MCNC: 4 G/DL (ref 3.5–5.2)
ALP SERPL-CCNC: 166 U/L (ref 40–150)
ALT SERPL W P-5'-P-CCNC: 38 U/L (ref 0–50)
ANION GAP SERPL CALCULATED.3IONS-SCNC: 9 MMOL/L (ref 7–15)
AST SERPL W P-5'-P-CCNC: 34 U/L (ref 0–45)
BILIRUB DIRECT SERPL-MCNC: <0.2 MG/DL (ref 0–0.3)
BILIRUB SERPL-MCNC: 0.8 MG/DL
BUN SERPL-MCNC: 35.9 MG/DL (ref 8–23)
CALCIUM SERPL-MCNC: 9.4 MG/DL (ref 8.8–10.4)
CHLORIDE SERPL-SCNC: 106 MMOL/L (ref 98–107)
CREAT SERPL-MCNC: 0.87 MG/DL (ref 0.51–0.95)
CYCLOSPORINE BLD LC/MS/MS-MCNC: 183 UG/L (ref 50–400)
EGFRCR SERPLBLD CKD-EPI 2021: 76 ML/MIN/1.73M2
ERYTHROCYTE [DISTWIDTH] IN BLOOD BY AUTOMATED COUNT: 15.2 % (ref 10–15)
GLUCOSE SERPL-MCNC: 95 MG/DL (ref 70–99)
HCO3 SERPL-SCNC: 25 MMOL/L (ref 22–29)
HCT VFR BLD AUTO: 34.7 % (ref 35–47)
HGB BLD-MCNC: 11.1 G/DL (ref 11.7–15.7)
MAGNESIUM SERPL-MCNC: 1.8 MG/DL (ref 1.7–2.3)
MCH RBC QN AUTO: 27.8 PG (ref 26.5–33)
MCHC RBC AUTO-ENTMCNC: 32 G/DL (ref 31.5–36.5)
MCV RBC AUTO: 87 FL (ref 78–100)
PHOSPHATE SERPL-MCNC: 4.6 MG/DL (ref 2.5–4.5)
PLATELET # BLD AUTO: 208 10E3/UL (ref 150–450)
POTASSIUM SERPL-SCNC: 4.6 MMOL/L (ref 3.4–5.3)
PROT SERPL-MCNC: 6.8 G/DL (ref 6.4–8.3)
RBC # BLD AUTO: 3.99 10E6/UL (ref 3.8–5.2)
SODIUM SERPL-SCNC: 140 MMOL/L (ref 135–145)
TME LAST DOSE: NORMAL H
TME LAST DOSE: NORMAL H
WBC # BLD AUTO: 2.5 10E3/UL (ref 4–11)

## 2024-11-25 ENCOUNTER — THERAPY VISIT (OUTPATIENT)
Dept: PHYSICAL THERAPY | Facility: CLINIC | Age: 59
End: 2024-11-25
Attending: NURSE PRACTITIONER
Payer: MEDICARE

## 2024-11-25 DIAGNOSIS — R53.81 PHYSICAL DECONDITIONING: Primary | ICD-10-CM

## 2024-11-25 PROCEDURE — 97116 GAIT TRAINING THERAPY: CPT | Mod: GP | Performed by: PHYSICAL THERAPIST

## 2024-11-25 PROCEDURE — 97112 NEUROMUSCULAR REEDUCATION: CPT | Mod: GP | Performed by: PHYSICAL THERAPIST

## 2024-11-25 PROCEDURE — 97110 THERAPEUTIC EXERCISES: CPT | Mod: GP | Performed by: PHYSICAL THERAPIST

## 2024-11-26 ENCOUNTER — VIRTUAL VISIT (OUTPATIENT)
Dept: PHARMACY | Facility: CLINIC | Age: 59
End: 2024-11-26
Payer: COMMERCIAL

## 2024-11-26 ENCOUNTER — TELEPHONE (OUTPATIENT)
Dept: TRANSPLANT | Facility: CLINIC | Age: 59
End: 2024-11-26
Payer: MEDICARE

## 2024-11-26 DIAGNOSIS — Z94.4 S/P LIVER TRANSPLANT (H): ICD-10-CM

## 2024-11-26 DIAGNOSIS — Z94.4 HISTORY OF LIVER TRANSPLANT (H): ICD-10-CM

## 2024-11-26 DIAGNOSIS — Z94.4 LIVER TRANSPLANTED (H): Primary | ICD-10-CM

## 2024-11-26 DIAGNOSIS — Z78.9 TAKES DIETARY SUPPLEMENTS: ICD-10-CM

## 2024-11-26 PROCEDURE — 99207 PR NO CHARGE LOS: CPT | Mod: 93 | Performed by: PHARMACIST

## 2024-11-26 RX ORDER — FUROSEMIDE 20 MG/1
20 TABLET ORAL EVERY OTHER DAY
COMMUNITY

## 2024-11-26 NOTE — TELEPHONE ENCOUNTER
Pt  wants to clarify- Is she still to stop Valacyclovir on Dec 22 or do they need to re- order med  [facility] only has her taking CSA- 150 mg bid not as she is taking 175 mg bid  needs to review

## 2024-11-26 NOTE — PROGRESS NOTES
Medication Therapy Management (MTM) Encounter    ASSESSMENT:                            Medication Adherence/Access: No issues identified.    Liver transplant  Stable.      Supplements:   Stable.    PLAN:                            Follow-up: as needed    SUBJECTIVE/OBJECTIVE:                          Virginia Matute is a 59 year old female seen for a follow-up visit. Patient was accompanied by Albino.      Reason for visit: 5 months post transplant.      Allergies/ADRs: Reviewed in chart  Past Medical History: Reviewed in chart  Tobacco: She reports that she has never smoked. She has never been exposed to tobacco smoke. She has never used smokeless tobacco.  Alcohol: not currently using    Medication Adherence/Access: no issues reported.     Liver transplant  Cyclosporine 175mg twice daily  Pt reports hair loss on scalp, but may have been improving. Has hair growth on arms.   Transplant date: 6/22/24  Vascular Prophylaxis: Aspirin 81mg daily. Denies gastrointestinal bleed sx.   CMV prophylaxis: CrCl >40 mL/minute: Valcyte 450 mg once daily Donor (+), Recipient (-), treat 6 months post tx   PJP prophylaxis: Inhaled Pentamidine 300mg q 4 weeks for 6 months post txp.   PPI use: stopped Pantoprazole without issue.   Tx Coordinator: Zayra Paulino Tx MD: Dr. Cervantes, Dr. Ball, Using Med Card: Yes  Immunizations: annual flu shot [id number]; [id number]:  unknown; Prevnar 20: 2022; TDaP:  2020; Shingrix: x2, HBV: no immunity, COVID: Primary x 3    Estimated Creatinine Clearance: 73.6 mL/min (based on SCr of 0.87 mg/dL).      Supplements:   Mag Oxide 400mg twice daily ( 2 hours from [facility]) .  MVI daily.     Today's Vitals: LMP 05/31/2006   ----------------    I spent 12 minutes with this patient today.  A copy of the visit note was provided to the patient's provider(s).    A summary of these recommendations was sent via [employer].    Demetri Mariee PharmD  MTM Pharmacist    Phone: [phone]     Telemedicine  Visit Details  The patient's medications can be safely assessed via a telemedicine encounter.  Type of service:  Telephone visit  Originating Location (pt. Location): Home    Distant Location (provider location):  Off-site  Start Time: 4:00 PM  End Time: 4:12 PM     Medication Therapy Recommendations  No medication therapy recommendations to display

## 2024-11-26 NOTE — PATIENT INSTRUCTIONS
"Recommendations from today's MTM visit:                                                      No changes    Follow-up: as needed    It was great speaking with you today.  I value your experience and would be very thankful for your time in providing feedback in our clinic survey. In the next few days, you may receive an email or text message from [facility] [employer] with a link to a survey related to your  clinical pharmacist.\"     To schedule another MTM appointment, please call the clinic directly or you may call the MTM scheduling line at [phone] or toll-free at [phone].     My Clinical Pharmacist's contact information:                                                      Please feel free to contact me with any questions or concerns you have.      Demetri Mariee, PharmD  MTM Pharmacist    Phone: [phone]     "

## 2024-11-27 DIAGNOSIS — Z94.4 S/P LIVER TRANSPLANT (H): ICD-10-CM

## 2024-11-27 RX ORDER — CYCLOSPORINE 25 MG/1
75 CAPSULE, LIQUID FILLED ORAL EVERY 12 HOURS
Qty: 180 CAPSULE | Refills: 11 | Status: SHIPPED | OUTPATIENT
Start: 2024-11-27

## 2024-11-27 RX ORDER — CYCLOSPORINE 100 MG/1
100 CAPSULE, LIQUID FILLED ORAL 2 TIMES DAILY
Qty: 60 CAPSULE | Refills: 11 | Status: SHIPPED | OUTPATIENT
Start: 2024-11-27

## 2024-11-27 NOTE — TELEPHONE ENCOUNTER
11/21 CSA level within goal. If patient taking 175 mg BID. It's within goal. Can stay at 175 mg BID.

## 2024-11-27 NOTE — TELEPHONE ENCOUNTER
11/1 patient decreased CSA to 150 mg BID. This is the script on file with [facility].  [facility] confirmed dose change on 11/1.    Pt to stop valcyte on 12/22

## 2024-11-27 NOTE — TELEPHONE ENCOUNTER
Informed Albino that pt spoke with Zayra on 11/1 and discussed reducing CSA to 150mg BID. Albino states that pt did not change dose. Pt is taking 175mg BID. Remain on the same dose?

## 2024-11-29 ENCOUNTER — HOSPITAL ENCOUNTER (OUTPATIENT)
Dept: MRI IMAGING | Facility: CLINIC | Age: 59
Discharge: HOME | End: 2024-11-29
Attending: INTERNAL MEDICINE | Admitting: INTERNAL MEDICINE
Payer: MEDICARE

## 2024-11-29 DIAGNOSIS — Z94.4 S/P LIVER TRANSPLANT (H): ICD-10-CM

## 2024-11-29 PROCEDURE — 74183 MRI ABD W/O CNTR FLWD CNTR: CPT | Mod: MG

## 2024-11-29 PROCEDURE — 255N000002 HC RX 255 OP 636: Performed by: INTERNAL MEDICINE

## 2024-11-29 PROCEDURE — G1010 CDSM STANSON: HCPCS

## 2024-11-29 PROCEDURE — A9585 GADOBUTROL INJECTION: HCPCS | Performed by: INTERNAL MEDICINE

## 2024-11-29 RX ORDER — GADOBUTROL 604.72 MG/ML
8 INJECTION INTRAVENOUS ONCE
Status: COMPLETED | OUTPATIENT
Start: 2024-11-29 | End: 2024-11-29

## 2024-11-29 RX ADMIN — GADOBUTROL 8 ML: 604.72 INJECTION INTRAVENOUS at 19:49

## 2024-12-02 ENCOUNTER — THERAPY VISIT (OUTPATIENT)
Dept: PHYSICAL THERAPY | Facility: CLINIC | Age: 59
End: 2024-12-02
Attending: NURSE PRACTITIONER
Payer: MEDICARE

## 2024-12-02 DIAGNOSIS — R53.81 PHYSICAL DECONDITIONING: Primary | ICD-10-CM

## 2024-12-02 PROCEDURE — 97116 GAIT TRAINING THERAPY: CPT | Mod: GP | Performed by: PHYSICAL THERAPIST

## 2024-12-03 ENCOUNTER — TELEPHONE (OUTPATIENT)
Dept: TRANSPLANT | Facility: CLINIC | Age: 59
End: 2024-12-03
Payer: MEDICARE

## 2024-12-03 DIAGNOSIS — Z94.4 S/P LIVER TRANSPLANT (H): Primary | ICD-10-CM

## 2024-12-16 NOTE — TELEPHONE ENCOUNTER
Contacted Virginia. She did have her grandchildren and was unable to review imaging and was going to call back. No return call.     Spoke with patient. Her cold is improved, but now she has the stomach bug.     No fevers.    Pt has been able to keep fluids down. Pt will call back if no improvement by tomorrow.    Reviewed MRI results. Pt will call to make CT scan appt for march.

## 2024-12-22 ENCOUNTER — HEALTH MAINTENANCE LETTER (OUTPATIENT)
Age: 59
End: 2024-12-22

## 2024-12-26 ENCOUNTER — LAB (OUTPATIENT)
Dept: LAB | Facility: CLINIC | Age: 59
End: 2024-12-26
Payer: MEDICARE

## 2024-12-26 DIAGNOSIS — Z94.4 LIVER REPLACED BY TRANSPLANT (H): ICD-10-CM

## 2024-12-26 DIAGNOSIS — Z94.4 S/P LIVER TRANSPLANT (H): ICD-10-CM

## 2024-12-26 LAB
ALBUMIN SERPL BCG-MCNC: 3.8 G/DL (ref 3.5–5.2)
ALP SERPL-CCNC: 168 U/L (ref 40–150)
ALT SERPL W P-5'-P-CCNC: 34 U/L (ref 0–50)
ANION GAP SERPL CALCULATED.3IONS-SCNC: 11 MMOL/L (ref 7–15)
AST SERPL W P-5'-P-CCNC: 32 U/L (ref 0–45)
BILIRUB DIRECT SERPL-MCNC: 0.22 MG/DL (ref 0–0.3)
BILIRUB SERPL-MCNC: 0.8 MG/DL
BUN SERPL-MCNC: 22.8 MG/DL (ref 8–23)
CALCIUM SERPL-MCNC: 9 MG/DL (ref 8.8–10.4)
CHLORIDE SERPL-SCNC: 104 MMOL/L (ref 98–107)
CREAT SERPL-MCNC: 0.99 MG/DL (ref 0.51–0.95)
CYCLOSPORINE BLD LC/MS/MS-MCNC: 289 UG/L (ref 50–400)
EGFRCR SERPLBLD CKD-EPI 2021: 65 ML/MIN/1.73M2
ERYTHROCYTE [DISTWIDTH] IN BLOOD BY AUTOMATED COUNT: 13.2 % (ref 10–15)
GLUCOSE SERPL-MCNC: 95 MG/DL (ref 70–99)
HCO3 SERPL-SCNC: 26 MMOL/L (ref 22–29)
HCT VFR BLD AUTO: 33.2 % (ref 35–47)
HGB BLD-MCNC: 11.3 G/DL (ref 11.7–15.7)
MAGNESIUM SERPL-MCNC: 1.8 MG/DL (ref 1.7–2.3)
MCH RBC QN AUTO: 29 PG (ref 26.5–33)
MCHC RBC AUTO-ENTMCNC: 34 G/DL (ref 31.5–36.5)
MCV RBC AUTO: 85 FL (ref 78–100)
PHOSPHATE SERPL-MCNC: 3.7 MG/DL (ref 2.5–4.5)
PLATELET # BLD AUTO: 248 10E3/UL (ref 150–450)
POTASSIUM SERPL-SCNC: 3.8 MMOL/L (ref 3.4–5.3)
PROT SERPL-MCNC: 6.8 G/DL (ref 6.4–8.3)
RBC # BLD AUTO: 3.89 10E6/UL (ref 3.8–5.2)
SODIUM SERPL-SCNC: 141 MMOL/L (ref 135–145)
TME LAST DOSE: NORMAL H
TME LAST DOSE: NORMAL H
WBC # BLD AUTO: 3.3 10E3/UL (ref 4–11)

## 2024-12-31 ENCOUNTER — LAB (OUTPATIENT)
Dept: LAB | Facility: CLINIC | Age: 59
End: 2024-12-31
Payer: MEDICARE

## 2024-12-31 DIAGNOSIS — Z94.4 LIVER REPLACED BY TRANSPLANT (H): ICD-10-CM

## 2024-12-31 DIAGNOSIS — F10.11 ALCOHOL ABUSE, IN REMISSION: ICD-10-CM

## 2024-12-31 DIAGNOSIS — K70.31 ALCOHOLIC CIRRHOSIS OF LIVER WITH ASCITES (H): ICD-10-CM

## 2024-12-31 LAB
ALBUMIN SERPL BCG-MCNC: 4 G/DL (ref 3.5–5.2)
ALP SERPL-CCNC: 169 U/L (ref 40–150)
ALT SERPL W P-5'-P-CCNC: 32 U/L (ref 0–50)
ANION GAP SERPL CALCULATED.3IONS-SCNC: 9 MMOL/L (ref 7–15)
AST SERPL W P-5'-P-CCNC: 33 U/L (ref 0–45)
BILIRUB DIRECT SERPL-MCNC: <0.2 MG/DL (ref 0–0.3)
BILIRUB SERPL-MCNC: 0.7 MG/DL
BUN SERPL-MCNC: 27.9 MG/DL (ref 8–23)
CALCIUM SERPL-MCNC: 9.6 MG/DL (ref 8.8–10.4)
CHLORIDE SERPL-SCNC: 105 MMOL/L (ref 98–107)
CREAT SERPL-MCNC: 0.96 MG/DL (ref 0.51–0.95)
CYCLOSPORINE BLD LC/MS/MS-MCNC: 219 UG/L (ref 50–400)
EGFRCR SERPLBLD CKD-EPI 2021: 67 ML/MIN/1.73M2
ERYTHROCYTE [DISTWIDTH] IN BLOOD BY AUTOMATED COUNT: 13.8 % (ref 10–15)
GLUCOSE SERPL-MCNC: 100 MG/DL (ref 70–99)
HCO3 SERPL-SCNC: 25 MMOL/L (ref 22–29)
HCT VFR BLD AUTO: 33.3 % (ref 35–47)
HGB BLD-MCNC: 11.2 G/DL (ref 11.7–15.7)
MAGNESIUM SERPL-MCNC: 2.1 MG/DL (ref 1.7–2.3)
MCH RBC QN AUTO: 29.2 PG (ref 26.5–33)
MCHC RBC AUTO-ENTMCNC: 33.6 G/DL (ref 31.5–36.5)
MCV RBC AUTO: 87 FL (ref 78–100)
PHOSPHATE SERPL-MCNC: 4.2 MG/DL (ref 2.5–4.5)
PLATELET # BLD AUTO: 259 10E3/UL (ref 150–450)
POTASSIUM SERPL-SCNC: 5.4 MMOL/L (ref 3.4–5.3)
PROT SERPL-MCNC: 7 G/DL (ref 6.4–8.3)
RBC # BLD AUTO: 3.84 10E6/UL (ref 3.8–5.2)
SODIUM SERPL-SCNC: 139 MMOL/L (ref 135–145)
TME LAST DOSE: NORMAL H
TME LAST DOSE: NORMAL H
WBC # BLD AUTO: 3 10E3/UL (ref 4–11)

## 2024-12-31 PROCEDURE — 82248 BILIRUBIN DIRECT: CPT

## 2024-12-31 PROCEDURE — 99000 SPECIMEN HANDLING OFFICE-LAB: CPT

## 2024-12-31 PROCEDURE — 80053 COMPREHEN METABOLIC PANEL: CPT

## 2024-12-31 PROCEDURE — G0480 DRUG TEST DEF 1-7 CLASSES: HCPCS | Mod: 90

## 2024-12-31 PROCEDURE — 84100 ASSAY OF PHOSPHORUS: CPT

## 2024-12-31 PROCEDURE — 80158 DRUG ASSAY CYCLOSPORINE: CPT

## 2024-12-31 PROCEDURE — 83735 ASSAY OF MAGNESIUM: CPT

## 2024-12-31 PROCEDURE — 36415 COLL VENOUS BLD VENIPUNCTURE: CPT

## 2024-12-31 PROCEDURE — 85027 COMPLETE CBC AUTOMATED: CPT

## 2025-01-02 DIAGNOSIS — Z94.4 S/P LIVER TRANSPLANT (H): ICD-10-CM

## 2025-01-02 RX ORDER — CYCLOSPORINE 25 MG/1
25 CAPSULE, LIQUID FILLED ORAL EVERY 12 HOURS
Qty: 120 CAPSULE | Refills: 11 | Status: SHIPPED | OUTPATIENT
Start: 2025-01-02

## 2025-01-02 RX ORDER — CYCLOSPORINE 100 MG/1
100 CAPSULE, LIQUID FILLED ORAL 2 TIMES DAILY
Qty: 60 CAPSULE | Refills: 11 | Status: SHIPPED | OUTPATIENT
Start: 2025-01-02

## 2025-01-02 NOTE — TELEPHONE ENCOUNTER
ISSUE:   Cyclosporine level 219 on 12/31, goal 150-200, dose 150 mg BID.    PLAN:   Call Patient and confirm this was an accurate 12-hour trough.   Verify Cyclosporine dose 150 mg BID.   Confirm no new medications or or missed doses.   Confirm no new illness / infection / diarrhea.   If accurate trough and accurate dose, decrease Cyclosporine dose to 125 mg BID AND be sure that she Stopped valcyte    Is this more than a 50% increase or decrease in current IS dose: No  If YES, justification:     Repeat labs in 2 weeks.  *If > 50% change in immunosuppression dose, repeat labs in 1 week.   Zayra Paulino RN      OUTCOME:   Pt confirmed through [employer], they confirm accurate trough level and current dose 150 mg BID.   Patient confirmed dose change to 125 mg BID.  Patient agreed to repeat labs in 2 weeks.   Orders sent to preferred pharmacy for dose change and lab for repeat labs.   Patient voiced understanding of plan and confirmed stopping valcyte through [employer].    Michelle Starks LPN

## 2025-01-07 ENCOUNTER — TELEPHONE (OUTPATIENT)
Dept: PHARMACY | Facility: CLINIC | Age: 60
End: 2025-01-07
Payer: MEDICARE

## 2025-01-07 NOTE — TELEPHONE ENCOUNTER
Clinical Pharmacy Consult:                                                      Transplant Specific: 6 month post transplant pharmacy review.   Date of Transplant: 06/22/2024  Type of Transplant: liver  First Transplant: yes  History of rejection: no    Immunosuppression Regimen    mg qAM & 125 mg qPM  Patient specific goal: 150-200  Most recent level: 219, date 12/31/24  Immunosuppressant Levels:  Supratherapeutic, dose adjusted  Pt adherent to lab draws: yes  Scr:   Lab Results   Component Value Date    CR 0.69 07/22/2024    CR 0.71 02/23/2021     Side effects: none    Prophylactic Medications  PJP Prophylactic: Inhaled Pentamidine 300mg q 4 weeks  Scheduled Discontinue Date: 6 months - completed    Antifungal: Not needed thus far  Scheduled Discontinue Date: N/A Anticipated date      CMV Prophylactic: CrCl 40 to 59 mL/minute: Valcyte 450 mg once daily   Scheduled Discontinue Date: 6 months Anticipated date 12/22/2024    Acid Reducer: Protonix (pantoprazole)  Scheduled Reviewed Date:  N/a    Vascular Prophylactic: Aspirin 81 mg PO daily  Scheduled Discontinue Date:  MD to evaluate    Blood Pressure Management:   Patient blood pressure goal: <140/90 mmHg  Frequency of home blood pressure checks: not checking  Most recent home BP: 130/80 in clinic  Patient blood pressure at goal:  Yes  Hospitalizations/ER visits since last assessment: 0.     Med rec/DUR performed: yes.   Med rec discrepancies: yes, stopped ASA    Spoke with Albino today via phone. Reported Virginia is doing great. No missed dose, no side effects. She is taking meds at 8/noon/4/8 due to mag-ox. Asked if she is ok taking Mag-ox as same time as CSA. Told him ok to do so.  Denied any GI symptoms. No N/V/D.  Denied fever, pain, fatigue, swelling, ascites or jaundice symptoms.  CSA was high, confirmed pt taking adjusted dose.  BP at goal.    No other question or concerns today. Follow up in 6 months.    Brendan Castellanos, Pharm D  [facility]

## 2025-01-14 ENCOUNTER — MYC MEDICAL ADVICE (OUTPATIENT)
Dept: FAMILY MEDICINE | Facility: CLINIC | Age: 60
End: 2025-01-14
Payer: MEDICARE

## 2025-01-14 DIAGNOSIS — Z94.4 S/P LIVER TRANSPLANT (H): Primary | ICD-10-CM

## 2025-01-16 ENCOUNTER — LAB (OUTPATIENT)
Dept: LAB | Facility: CLINIC | Age: 60
End: 2025-01-16
Payer: MEDICARE

## 2025-01-16 ENCOUNTER — INFUSION THERAPY VISIT (OUTPATIENT)
Dept: INFUSION THERAPY | Facility: CLINIC | Age: 60
End: 2025-01-16
Attending: SURGERY
Payer: MEDICARE

## 2025-01-16 VITALS
HEART RATE: 89 BPM | OXYGEN SATURATION: 100 % | TEMPERATURE: 98.8 F | SYSTOLIC BLOOD PRESSURE: 134 MMHG | DIASTOLIC BLOOD PRESSURE: 88 MMHG | BODY MASS INDEX: 30.79 KG/M2 | RESPIRATION RATE: 16 BRPM | WEIGHT: 185 LBS

## 2025-01-16 DIAGNOSIS — Z94.4 S/P LIVER TRANSPLANT (H): ICD-10-CM

## 2025-01-16 DIAGNOSIS — Z94.4 LIVER REPLACED BY TRANSPLANT (H): ICD-10-CM

## 2025-01-16 DIAGNOSIS — K50.119 CROHN'S DISEASE OF PERIANAL REGION WITH COMPLICATION (H): Primary | ICD-10-CM

## 2025-01-16 DIAGNOSIS — K50.119 CROHN'S DISEASE OF PERIANAL REGION WITH COMPLICATION (H): ICD-10-CM

## 2025-01-16 LAB
ALBUMIN SERPL BCG-MCNC: 4.3 G/DL (ref 3.5–5.2)
ALP SERPL-CCNC: 205 U/L (ref 40–150)
ALT SERPL W P-5'-P-CCNC: 37 U/L (ref 0–50)
ANION GAP SERPL CALCULATED.3IONS-SCNC: 10 MMOL/L (ref 7–15)
AST SERPL W P-5'-P-CCNC: 27 U/L (ref 0–45)
BASOPHILS # BLD AUTO: 0 10E3/UL (ref 0–0.2)
BASOPHILS NFR BLD AUTO: 0 %
BILIRUB DIRECT SERPL-MCNC: 0.23 MG/DL (ref 0–0.3)
BILIRUB SERPL-MCNC: 0.9 MG/DL
BUN SERPL-MCNC: 29.2 MG/DL (ref 8–23)
CALCIUM SERPL-MCNC: 9.9 MG/DL (ref 8.8–10.4)
CHLORIDE SERPL-SCNC: 106 MMOL/L (ref 98–107)
CREAT SERPL-MCNC: 1.05 MG/DL (ref 0.51–0.95)
CRP SERPL-MCNC: 17.9 MG/L
CYCLOSPORINE BLD LC/MS/MS-MCNC: 112 UG/L (ref 50–400)
EGFRCR SERPLBLD CKD-EPI 2021: 61 ML/MIN/1.73M2
EOSINOPHIL # BLD AUTO: 0.3 10E3/UL (ref 0–0.7)
EOSINOPHIL NFR BLD AUTO: 5 %
ERYTHROCYTE [DISTWIDTH] IN BLOOD BY AUTOMATED COUNT: 13.8 % (ref 10–15)
GLUCOSE SERPL-MCNC: 107 MG/DL (ref 70–99)
HCO3 SERPL-SCNC: 25 MMOL/L (ref 22–29)
HCT VFR BLD AUTO: 34.5 % (ref 35–47)
HGB BLD-MCNC: 11.7 G/DL (ref 11.7–15.7)
IMM GRANULOCYTES # BLD: 0 10E3/UL
IMM GRANULOCYTES NFR BLD: 0 %
LYMPHOCYTES # BLD AUTO: 0.8 10E3/UL (ref 0.8–5.3)
LYMPHOCYTES NFR BLD AUTO: 16 %
MAGNESIUM SERPL-MCNC: 2.1 MG/DL (ref 1.7–2.3)
MCH RBC QN AUTO: 29.5 PG (ref 26.5–33)
MCHC RBC AUTO-ENTMCNC: 33.9 G/DL (ref 31.5–36.5)
MCV RBC AUTO: 87 FL (ref 78–100)
MONOCYTES # BLD AUTO: 0.5 10E3/UL (ref 0–1.3)
MONOCYTES NFR BLD AUTO: 10 %
NEUTROPHILS # BLD AUTO: 3.4 10E3/UL (ref 1.6–8.3)
NEUTROPHILS NFR BLD AUTO: 69 %
PHOSPHATE SERPL-MCNC: 4.6 MG/DL (ref 2.5–4.5)
PLATELET # BLD AUTO: 233 10E3/UL (ref 150–450)
POTASSIUM SERPL-SCNC: 4.5 MMOL/L (ref 3.4–5.3)
PROT SERPL-MCNC: 7.6 G/DL (ref 6.4–8.3)
RBC # BLD AUTO: 3.96 10E6/UL (ref 3.8–5.2)
SODIUM SERPL-SCNC: 141 MMOL/L (ref 135–145)
TME LAST DOSE: NORMAL H
TME LAST DOSE: NORMAL H
WBC # BLD AUTO: 5 10E3/UL (ref 4–11)

## 2025-01-16 PROCEDURE — 258N000003 HC RX IP 258 OP 636: Performed by: INTERNAL MEDICINE

## 2025-01-16 RX ORDER — DIPHENHYDRAMINE HYDROCHLORIDE 50 MG/ML
25 INJECTION INTRAMUSCULAR; INTRAVENOUS
Start: 2025-03-05

## 2025-01-16 RX ORDER — EPINEPHRINE 1 MG/ML
0.3 INJECTION, SOLUTION INTRAMUSCULAR; SUBCUTANEOUS EVERY 5 MIN PRN
OUTPATIENT
Start: 2025-03-05

## 2025-01-16 RX ORDER — ALBUTEROL SULFATE 90 UG/1
1-2 INHALANT RESPIRATORY (INHALATION)
Start: 2025-03-05

## 2025-01-16 RX ORDER — ACETAMINOPHEN 325 MG/1
650 TABLET ORAL ONCE
Start: 2025-03-05 | End: 2025-03-05

## 2025-01-16 RX ORDER — METHYLPREDNISOLONE SODIUM SUCCINATE 40 MG/ML
40 INJECTION INTRAMUSCULAR; INTRAVENOUS
Start: 2025-03-05

## 2025-01-16 RX ORDER — DIPHENHYDRAMINE HYDROCHLORIDE 50 MG/ML
50 INJECTION INTRAMUSCULAR; INTRAVENOUS
Start: 2025-03-05

## 2025-01-16 RX ORDER — ALBUTEROL SULFATE 0.83 MG/ML
2.5 SOLUTION RESPIRATORY (INHALATION)
OUTPATIENT
Start: 2025-03-05

## 2025-01-16 RX ORDER — MEPERIDINE HYDROCHLORIDE 25 MG/ML
25 INJECTION INTRAMUSCULAR; INTRAVENOUS; SUBCUTANEOUS
OUTPATIENT
Start: 2025-03-05

## 2025-01-16 RX ORDER — DIPHENHYDRAMINE HCL 25 MG
25 CAPSULE ORAL ONCE
Start: 2025-03-05 | End: 2025-03-05

## 2025-01-16 RX ADMIN — SODIUM CHLORIDE 800 MG: 9 INJECTION, SOLUTION INTRAVENOUS at 12:18

## 2025-01-16 NOTE — PROGRESS NOTES
Infusion Nursing Note:  Abiolajeannette Matute presents today for infliximab.    Patient seen by provider today: No   present during visit today: Not Applicable.    Note: N/A.      Intravenous Access:  Peripheral IV placed.    Treatment Conditions:  Biological Infusion Checklist:  ~~~ NOTE: If the patient answers yes to any of the questions below, hold the infusion and contact ordering provider or on-call provider.    Have you recently had an elevated temperature, fever, chills, productive cough, coughing for 3 weeks or longer or hemoptysis,  abnormal vital signs, night sweats,  chest pain or have you noticed a decrease in your appetite, unexplained weight loss or fatigue? No  Do you have any open wounds or new incisions? No  Do you have any upcoming hospitalizations or surgeries? Does not include esophagogastroduodenoscopy, colonoscopy, endoscopic retrograde cholangiopancreatography (ERCP), endoscopic ultrasound (EUS), dental procedures or joint aspiration/steroid injections No  Do you currently have any signs of illness or infection or are you on any antibiotics? No  Have you had any new, sudden or worsening abdominal pain? No  Have you or anyone in your household received a live vaccination in the past 4 weeks? Please note: No live vaccines while on biologic/chemotherapy until 6 months after the last treatment. Patient can receive the flu vaccine (shot only), pneumovax and the Covid vaccine. It is optimal for the patient to get these vaccines mid cycle, but they can be given at any time as long as it is not on the day of the infusion. No  Have you recently been diagnosed with any new nervous system diseases (ie. Multiple sclerosis, Guillain [place], seizures, neurological changes) or cancer diagnosis? Are you on any form of radiation or chemotherapy? No  Are you pregnant or breast feeding or do you have plans of pregnancy in the future? No  Have you been having any signs of worsening depression or suicidal  ideations?  (benlysta only) No  Have there been any other new onset medical symptoms? No  Have you had any new blood clots? (IVIG only) No      Post Infusion Assessment:  Patient tolerated infusion without incident.  Blood return noted pre and post infusion.  Site patent and intact, free from redness, edema or discomfort.  No evidence of extravasations.  Access discontinued per protocol.       Discharge Plan:   Discharge instructions reviewed with: Patient.  Patient and/or family verbalized understanding of discharge instructions and all questions answered.  Patient discharged in stable condition accompanied by: self.  Departure Mode: Ambulatory.      Nereida Peres RN

## 2025-01-20 LAB
INFLIXIMAB AB SERPL IA-MCNC: <3.1 U/ML
INFLIXIMAB SERPL-MCNC: 8.3 UG/ML

## 2025-01-25 ENCOUNTER — HEALTH MAINTENANCE LETTER (OUTPATIENT)
Age: 60
End: 2025-01-25

## 2025-01-28 ENCOUNTER — OFFICE VISIT (OUTPATIENT)
Dept: GASTROENTEROLOGY | Facility: CLINIC | Age: 60
End: 2025-01-28
Attending: INTERNAL MEDICINE
Payer: MEDICARE

## 2025-01-28 ENCOUNTER — TELEPHONE (OUTPATIENT)
Dept: GASTROENTEROLOGY | Facility: CLINIC | Age: 60
End: 2025-01-28

## 2025-01-28 VITALS
BODY MASS INDEX: 31.79 KG/M2 | SYSTOLIC BLOOD PRESSURE: 124 MMHG | DIASTOLIC BLOOD PRESSURE: 87 MMHG | OXYGEN SATURATION: 100 % | HEART RATE: 80 BPM | TEMPERATURE: 98.4 F | WEIGHT: 190.8 LBS | HEIGHT: 65 IN

## 2025-01-28 DIAGNOSIS — Z94.4 LIVER REPLACED BY TRANSPLANT (H): ICD-10-CM

## 2025-01-28 PROCEDURE — 99214 OFFICE O/P EST MOD 30 MIN: CPT | Performed by: INTERNAL MEDICINE

## 2025-01-28 PROCEDURE — G0463 HOSPITAL OUTPT CLINIC VISIT: HCPCS | Performed by: INTERNAL MEDICINE

## 2025-01-28 PROCEDURE — G2211 COMPLEX E/M VISIT ADD ON: HCPCS | Performed by: INTERNAL MEDICINE

## 2025-01-28 ASSESSMENT — PAIN SCALES - GENERAL: PAINLEVEL_OUTOF10: NO PAIN (0)

## 2025-01-28 NOTE — PROGRESS NOTES
[facility]  LIVER TRANSPLANT CLINIC      A/P  Ms. 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    Post op course c/b    -fever, rash (thought 2/2 dapsone)  -hypoMag, hypoCa  -seizure (tac stopped)  -significant scalp hair loss    Graft function Excellent. AP mildly elevated, stable. Will watch closely.  HCC No HCC on explant. Will need HCC surveillance   IS CSA per protocol. Taken off tac due to seizures. Continue monitoring labs and IS level to assess for appropriate dosing and side effects from IS including RADHA, pancytopenia, hyperkalemia, hypomagnesemia.    Seizure on Keppra. Rec she meet with neurologist to assess for weaning off Keppra.    HYPOMG 2/2 IS Continue mag supplement    Hair loss Started biotin. Briefly discussed alopecia areata. This has improved.  Water retention She describes puffy hands and feet. Is not explicitly following low sodium diet (discussed less than 2000 mg/d). She is drinking a very large fluid volume (4.5 L/d) and instructed her to decrease fluid intake.  Ok to continue furosemide 20 mg every other day.    Hematoma Bloating. Will repeat CT  [facility]uling   [place], [place], [place], [place], [place]: [phone]  [place]: [phone]  [place], [place]: [phone]    Weight gain Is going to talk to PCP about GLP1 agonist. I let her know that this is ok. She also plans to start an exercise program and asks about any restrictions.     RTC 3-4 mo in person or video  Jeannette Cervantes MD  Hepatology/ Liver Transplant  [facility]  ========================================================  PCP: Linda Macdonald NP    SUBJECTIVE  Ms. 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 Here today with her .    Post op course c/b    -fever, rash (thought 2/2 dapsone), resolved  -hypoMag, hypoCa, resolved  -seizure (tac stopped),  "on Keppra  -significant scalp hair loss, resolved  -fluid collection on imaging    MRI 11/29/24   1.  Postoperative changes of liver transplant. No intrahepatic lesions are identified.  2.  There is a complex collection along the undersurface of the right hepatic lobe abutting the liver capsule. This measures 3.1 x 1.9 cm and shows no appreciable enhancement. There is high T1 signal within this collection likely reflecting old blood products as well as 2 areas of artifact which correspond to surgical clips seen on the prior CT. This collection was much larger on the previous CT and is likely postoperative changes with debris and blood products from the prior surgery. This can be monitored with a follow-up noncontrast CT in approximately 3-4 months to assess for continued regression and/or stability. If this patient develops any right upper quadrant pain or symptoms this could be reassessed at that time.  3.  Cholecystectomy. No biliary dilatation.  4.  Subcentimeter cortical cyst right kidney requires no additional follow-up    She is still experiencing a sensation of \"bloating\". It is a constant sensation. Crohn's thought to be quiescent. 2-3  soft BM/d.     HCC  3.2 cm seg 7. . PreLT treatment MWA 8/29/23.  EXPLANT:  A. 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:   -See gross description for ablation site size   -No evidence to suggest pre-treatment vascular invasion or extrahepatic extension   -Advanced background cirrhosis, inactive (Laennec fibrosis stage 4C):   -Compatible with DUNN/MASLD etiology, now burnt out    -The PAS and PAS-D stains show no abnormal alpha-1-antitrypsin profile      -Iron stain is positive (2-3+) for stainable parenchymal and Kupffer iron    -Bile ducts are present in normal numbers and architecture  Gallbladder: Cholelithiases with chronic cholecystitis     IS: CSA   LABS: Up to date    Liver " "Function Studies -   Recent Labs   Lab Test 01/16/25  0840   PROTTOTAL 7.6   ALBUMIN 4.3   BILITOTAL 0.9   ALKPHOS 205*   AST 27   ALT 37     CBC RESULTS:   Recent Labs   Lab Test 01/16/25  0840   WBC 5.0   RBC 3.96   HGB 11.7   HCT 34.5*   MCV 87   MCH 29.5   MCHC 33.9   RDW 13.8            Liver Function Studies -   Recent Labs   Lab Test 10/10/24  0813   PROTTOTAL 7.5   ALBUMIN 4.3   BILITOTAL 0.8   ALKPHOS 170*   AST 30   ALT 24     CBC RESULTS:   Recent Labs   Lab Test 10/10/24  0813   WBC 2.5*   RBC 4.54   HGB 12.3   HCT 39.4   MCV 87   MCH 27.1   MCHC 31.2*   RDW 14.7          REJECTION: None.  BILIARY ISSUES: None  STENT: out on AXR 9/23/24  KIDNEY FUNCTION:   Creatinine   Date Value Ref Range Status   01/16/2025 1.05 (H) 0.51 - 0.95 mg/dL Final   02/23/2021 0.71 0.52 - 1.04 mg/dL Final     BP: Good. No meds.  PREV:  Colonoscopy   Derm   Pap/pelvic   Mammo   Flu shot  DISEASE RECURRENCE: no alcohol since preLT  HCC surveillance: MRI 11/2024  OTHER ISSUES:  NEW ISSUES:     SOC: at home with   ROS: 10 point ROS neg other than the symptoms noted above in the HPI.    Exam  /87 (BP Location: Right arm, Patient Position: Sitting, Cuff Size: Adult Regular)   Pulse 80   Temp 98.4  F (36.9  C) (Oral)   Ht 1.651 m (5' 5\")   Wt 86.5 kg (190 lb 12.8 oz)   LMP 05/31/2006   SpO2 100%   BMI 31.75 kg/m      Gen Alert pleasant NAD  Resp No difficulty breathing. No cough  Skin No Jaundice  Eyes No icterus  Neuro HERRERA  Abd scar well healed. No hernia  MSK no muscle wasting  Psyche Pleasant, appropriate. Well groomed.      Creatinine   Date Value Ref Range Status   01/16/2025 1.05 (H) 0.51 - 0.95 mg/dL Final   02/23/2021 0.71 0.52 - 1.04 mg/dL Final   ]   Lab Results   Component Value Date    BILITOTAL 0.8 10/10/2024    BILITOTAL 0.6 10/03/2024    BILITOTAL 0.8 09/23/2024    BILITOTAL 0.5 09/19/2024    BILITOTAL 0.7 09/12/2024    BILITOTAL 1.8 06/22/2021    BILITOTAL 1.9 04/27/2021    BILITOTAL " 1.3 03/02/2021    BILITOTAL 1.9 10/27/2020    BILITOTAL 2.1 09/04/2020      Lab Results   Component Value Date    ALT 24 10/10/2024    ALT 52 06/22/2021      Lab Results   Component Value Date    ALBUMIN 4.3 10/10/2024    ALBUMIN 3.3 10/03/2022    ALBUMIN 3.6 06/22/2021       Hemoglobin   Date Value Ref Range Status   01/16/2025 11.7 11.7 - 15.7 g/dL Final   06/22/2021 13.7 11.7 - 15.7 g/dL Final   ]    Current Outpatient Medications   Medication Sig Dispense Refill    acetaminophen (TYLENOL) 325 MG tablet Take 2 tablets (650 mg) by mouth every 6 hours as needed for mild pain 60 tablet 0    albuterol (PROVENTIL) (2.5 MG/3ML) 0.083% neb solution Take 1 vial (2.5 mg) by nebulization every 28 days 90 mL 5    aspirin (ASA) 81 MG chewable tablet Take 1 tablet (81 mg) by mouth or Feeding Tube daily 30 tablet 11    Biotin 5000 MCG TABS Take 1 tablet by mouth daily.      cycloSPORINE modified (GENERIC EQUIVALENT) 100 MG capsule Take 1 capsule (100 mg) by mouth 2 times daily. Total dose 125 mg BID 60 capsule 11    cycloSPORINE modified (GENERIC EQUIVALENT) 25 MG capsule Take 1 capsule (25 mg) by mouth every 12 hours. Total dose 125 mg  capsule 11    emollient (VANICREAM) external cream Apply topically every 6 hours as needed for other (Dryness) (Patient taking differently: Apply topically every 6 hours as needed for other (Dryness). Or other products) 453 g 1    furosemide (LASIX) 20 MG tablet Take 20 mg by mouth every other day.      levETIRAcetam (KEPPRA) 1000 MG tablet Take 1 tablet (1,000 mg) by mouth 3 times daily. 180 tablet 3    midazolam 5 mg/mL (VERSED) 5 mg/mL intranasal solution Apply 1 mL (5 mg) into one nostril as directed once as needed for seizures (1 squirt in JUST ONE nostril.  To be used for GTC greater than 3 to 4 minutes, focal seizures [smaller seizures] greater than 10 minutes, 3 or more GTC or focal seizures within 24 hours). 2 mL 2    multivitamin w/minerals (THERA-VIT-M) tablet Take 1 tablet  by mouth daily.      psyllium (METAMUCIL) WAFR Take 2 Wafers by mouth daily 60 Wafer 2    traZODone (DESYREL) 50 MG tablet Take 0.5-1 tablets (25-50 mg) by mouth nightly as needed for sleep 30 tablet 2     No current facility-administered medications for this visit.     Facility-Administered Medications Ordered in Other Visits   Medication Dose Route Frequency Provider Last Rate Last Admin    hyoscyamine (LEVSIN/SL) sublingual tablet 125 mcg  125 mcg Sublingual Q4H PRN Farhan Schilling MD         The longitudinal plan of care for the diagnosis(es)/condition(s) as documented were addressed during this visit. Due to the added complexity in care, I will continue to support Virginia in the subsequent management and with ongoing continuity of care.

## 2025-01-28 NOTE — TELEPHONE ENCOUNTER
Left Voicemail (1st Attempt) for the patient to call back and schedule the following:    Appointment type: return   Provider: Dr. Schilling   Return date: 4/7/2025  Specialty phone number: [phone]  Additional appointment(s) needed:   Additonal Notes:            *Hi    For  reasons I had to change my 4/7 AM clinic at [facility] to virtual.    Can you please make sure all patients are OK with virtual that day?

## 2025-01-28 NOTE — LETTER
2025      Abiola Matute  [address] [address]      Dear Colleague,    Thank you for referring your patient, Abiola Matute, to the [facility] [place]. Please see a copy of my visit note below.    [facility]  LIVER TRANSPLANT CLINIC      A/P  Ms. 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    Post op course c/b    -fever, rash (thought 2/2 dapsone)  -hypoMag, hypoCa  -seizure (tac stopped)  -significant scalp hair loss    Graft function Excellent. AP mildly elevated, stable. Will watch closely.  HCC No HCC on explant. Will need HCC surveillance   IS CSA per protocol. Taken off tac due to seizures. Continue monitoring labs and IS level to assess for appropriate dosing and side effects from IS including RADHA, pancytopenia, hyperkalemia, hypomagnesemia.    Seizure on Keppra. Rec she meet with neurologist to assess for weaning off Keppra.    HYPOMG 2/2 IS Continue mag supplement    Hair loss Started biotin. Briefly discussed alopecia areata. This has improved.  Water retention She describes puffy hands and feet. Is not explicitly following low sodium diet (discussed less than 2000 mg/d). She is drinking a very large fluid volume (4.5 L/d) and instructed her to decrease fluid intake.  Ok to continue furosemide 20 mg every other day.    Hematoma Bloating. Will repeat CT  [facility]   [place], [place], [place]: [phone]  [place]/[facility]: [phone]  [place], [place]: [phone]    Weight gain Is going to talk to PCP about GLP1 agonist. I let her know that this is ok. She also plans to start an exercise program and asks about any restrictions.     RTC 3-4 mo in person or video  Jeannette Cervantes MD  Hepatology/ Liver Transplant  [facility]  ========================================================  PCP: Linda Macdonald  "NP    SUBJECTIVE  Ms. 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 Here today with her .    Post op course c/b    -fever, rash (thought 2/2 dapsone), resolved  -hypoMag, hypoCa, resolved  -seizure (tac stopped), on Keppra  -significant scalp hair loss, resolved  -fluid collection on imaging    MRI 11/29/24   1.  Postoperative changes of liver transplant. No intrahepatic lesions are identified.  2.  There is a complex collection along the undersurface of the right hepatic lobe abutting the liver capsule. This measures 3.1 x 1.9 cm and shows no appreciable enhancement. There is high T1 signal within this collection likely reflecting old blood products as well as 2 areas of artifact which correspond to surgical clips seen on the prior CT. This collection was much larger on the previous CT and is likely postoperative changes with debris and blood products from the prior surgery. This can be monitored with a follow-up noncontrast CT in approximately 3-4 months to assess for continued regression and/or stability. If this patient develops any right upper quadrant pain or symptoms this could be reassessed at that time.  3.  Cholecystectomy. No biliary dilatation.  4.  Subcentimeter cortical cyst right kidney requires no additional follow-up    She is still experiencing a sensation of \"bloating\". It is a constant sensation. Crohn's thought to be quiescent. 2-3  soft BM/d.     HCC  3.2 cm seg 7. . PreLT treatment MWA 8/29/23.  EXPLANT:  A. 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:   -See gross description for ablation site size   -No evidence to suggest pre-treatment vascular invasion or extrahepatic extension   -Advanced background cirrhosis, inactive (Laennec fibrosis stage 4C):   -Compatible with DUNN/MASLD etiology, now burnt out    -The PAS and PAS-D stains show no abnormal " "alpha-1-antitrypsin profile      -Iron stain is positive (2-3+) for stainable parenchymal and Kupffer iron    -Bile ducts are present in normal numbers and architecture  Gallbladder: Cholelithiases with chronic cholecystitis     IS: CSA   LABS: Up to date    Liver Function Studies -   Recent Labs   Lab Test 01/16/25  0840   PROTTOTAL 7.6   ALBUMIN 4.3   BILITOTAL 0.9   ALKPHOS 205*   AST 27   ALT 37     CBC RESULTS:   Recent Labs   Lab Test 01/16/25  0840   WBC 5.0   RBC 3.96   HGB 11.7   HCT 34.5*   MCV 87   MCH 29.5   MCHC 33.9   RDW 13.8            Liver Function Studies -   Recent Labs   Lab Test 10/10/24  0813   PROTTOTAL 7.5   ALBUMIN 4.3   BILITOTAL 0.8   ALKPHOS 170*   AST 30   ALT 24     CBC RESULTS:   Recent Labs   Lab Test 10/10/24  0813   WBC 2.5*   RBC 4.54   HGB 12.3   HCT 39.4   MCV 87   MCH 27.1   MCHC 31.2*   RDW 14.7          REJECTION: None.  BILIARY ISSUES: None  STENT: out on AXR 9/23/24  KIDNEY FUNCTION:   Creatinine   Date Value Ref Range Status   01/16/2025 1.05 (H) 0.51 - 0.95 mg/dL Final   02/23/2021 0.71 0.52 - 1.04 mg/dL Final     BP: Good. No meds.  PREV:  Colonoscopy   Derm   Pap/pelvic   Mammo   Flu shot  DISEASE RECURRENCE: no alcohol since preLT  HCC surveillance: MRI 11/2024  OTHER ISSUES:  NEW ISSUES:     SOC: at home with   ROS: 10 point ROS neg other than the symptoms noted above in the HPI.    Exam  /87 (BP Location: Right arm, Patient Position: Sitting, Cuff Size: Adult Regular)   Pulse 80   Temp 98.4  F (36.9  C) (Oral)   Ht 1.651 m (5' 5\")   Wt 86.5 kg (190 lb 12.8 oz)   LMP 05/31/2006   SpO2 100%   BMI 31.75 kg/m      Gen Alert pleasant NAD  Resp No difficulty breathing. No cough  Skin No Jaundice  Eyes No icterus  Neuro HERRERA  Abd scar well healed. No hernia  MSK no muscle wasting  Psyche Pleasant, appropriate. Well groomed.      Creatinine   Date Value Ref Range Status   01/16/2025 1.05 (H) 0.51 - 0.95 mg/dL Final   02/23/2021 0.71 0.52 - " 1.04 mg/dL Final   ]   Lab Results   Component Value Date    BILITOTAL 0.8 10/10/2024    BILITOTAL 0.6 10/03/2024    BILITOTAL 0.8 09/23/2024    BILITOTAL 0.5 09/19/2024    BILITOTAL 0.7 09/12/2024    BILITOTAL 1.8 06/22/2021    BILITOTAL 1.9 04/27/2021    BILITOTAL 1.3 03/02/2021    BILITOTAL 1.9 10/27/2020    BILITOTAL 2.1 09/04/2020      Lab Results   Component Value Date    ALT 24 10/10/2024    ALT 52 06/22/2021      Lab Results   Component Value Date    ALBUMIN 4.3 10/10/2024    ALBUMIN 3.3 10/03/2022    ALBUMIN 3.6 06/22/2021       Hemoglobin   Date Value Ref Range Status   01/16/2025 11.7 11.7 - 15.7 g/dL Final   06/22/2021 13.7 11.7 - 15.7 g/dL Final   ]    Current Outpatient Medications   Medication Sig Dispense Refill     acetaminophen (TYLENOL) 325 MG tablet Take 2 tablets (650 mg) by mouth every 6 hours as needed for mild pain 60 tablet 0     albuterol (PROVENTIL) (2.5 MG/3ML) 0.083% neb solution Take 1 vial (2.5 mg) by nebulization every 28 days 90 mL 5     aspirin (ASA) 81 MG chewable tablet Take 1 tablet (81 mg) by mouth or Feeding Tube daily 30 tablet 11     Biotin 5000 MCG TABS Take 1 tablet by mouth daily.       cycloSPORINE modified (GENERIC EQUIVALENT) 100 MG capsule Take 1 capsule (100 mg) by mouth 2 times daily. Total dose 125 mg BID 60 capsule 11     cycloSPORINE modified (GENERIC EQUIVALENT) 25 MG capsule Take 1 capsule (25 mg) by mouth every 12 hours. Total dose 125 mg  capsule 11     emollient (VANICREAM) external cream Apply topically every 6 hours as needed for other (Dryness) (Patient taking differently: Apply topically every 6 hours as needed for other (Dryness). Or other products) 453 g 1     furosemide (LASIX) 20 MG tablet Take 20 mg by mouth every other day.       levETIRAcetam (KEPPRA) 1000 MG tablet Take 1 tablet (1,000 mg) by mouth 3 times daily. 180 tablet 3     midazolam 5 mg/mL (VERSED) 5 mg/mL intranasal solution Apply 1 mL (5 mg) into one nostril as directed once as  needed for seizures (1 squirt in JUST ONE nostril.  To be used for GTC greater than 3 to 4 minutes, focal seizures [smaller seizures] greater than 10 minutes, 3 or more GTC or focal seizures within 24 hours). 2 mL 2     multivitamin w/minerals (THERA-VIT-M) tablet Take 1 tablet by mouth daily.       psyllium (METAMUCIL) WAFR Take 2 Wafers by mouth daily 60 Wafer 2     traZODone (DESYREL) 50 MG tablet Take 0.5-1 tablets (25-50 mg) by mouth nightly as needed for sleep 30 tablet 2     No current facility-administered medications for this visit.     Facility-Administered Medications Ordered in Other Visits   Medication Dose Route Frequency Provider Last Rate Last Admin     hyoscyamine (LEVSIN/SL) sublingual tablet 125 mcg  125 mcg Sublingual Q4H PRN Farhan Schilling MD         The longitudinal plan of care for the diagnosis(es)/condition(s) as documented were addressed during this visit. Due to the added complexity in care, I will continue to support Virginia in the subsequent management and with ongoing continuity of care.      Again, thank you for allowing me to participate in the care of your patient.        Sincerely,        Jeannette Cervantes MD    Electronically signed

## 2025-01-28 NOTE — NURSING NOTE
"Chief Complaint   Patient presents with    RECHECK     Follow up     Vitals:    01/28/25 0807   BP: 124/87   BP Location: Right arm   Patient Position: Sitting   Cuff Size: Adult Regular   Pulse: 80   Temp: 98.4  F (36.9  C)   TempSrc: Oral   SpO2: 100%   Weight: 86.5 kg (190 lb 12.8 oz)   Height: 1.651 m (5' 5\")       BP Readings from Last 3 Encounters:   01/28/25 124/87   01/16/25 134/88   11/05/24 130/80       /87 (BP Location: Right arm, Patient Position: Sitting, Cuff Size: Adult Regular)   Pulse 80   Temp 98.4  F (36.9  C) (Oral)   Ht 1.651 m (5' 5\")   Wt 86.5 kg (190 lb 12.8 oz)   LMP 05/31/2006   SpO2 100%   BMI 31.75 kg/m       Sushma Pryor    "
PAST MEDICAL HISTORY:  Gestational diabetes     PCOS (polycystic ovarian syndrome)

## 2025-01-30 ENCOUNTER — OFFICE VISIT (OUTPATIENT)
Dept: FAMILY MEDICINE | Facility: CLINIC | Age: 60
End: 2025-01-30
Payer: MEDICARE

## 2025-01-30 VITALS
SYSTOLIC BLOOD PRESSURE: 118 MMHG | HEIGHT: 65 IN | DIASTOLIC BLOOD PRESSURE: 78 MMHG | WEIGHT: 187 LBS | BODY MASS INDEX: 31.16 KG/M2 | OXYGEN SATURATION: 99 % | HEART RATE: 109 BPM | TEMPERATURE: 96.4 F | RESPIRATION RATE: 17 BRPM

## 2025-01-30 DIAGNOSIS — E66.811 CLASS 1 OBESITY WITH SERIOUS COMORBIDITY AND BODY MASS INDEX (BMI) OF 31.0 TO 31.9 IN ADULT, UNSPECIFIED OBESITY TYPE: Primary | ICD-10-CM

## 2025-01-30 NOTE — PROGRESS NOTES
Assessment & Plan     Virginia was seen today for weight loss.    Diagnoses and all orders for this visit:    Class 1 obesity with serious comorbidity and body mass index (BMI) of 31.0 to 31.9 in adult, unspecified obesity type  Did well on GLP-1 agonist prior to liver transplantation, would like to restart for continued weight loss support.   Plan Wegovy 0.25 mg once weekly for 1 month and then increase to 0.5 mg once weekly.    -     semaglutide-weight management (WEGOVY) 0.25 MG/0.5ML pen; Inject 0.5 mLs (0.25 mg) subcutaneously once a week for 28 days.  -     Semaglutide-Weight Management (WEGOVY) 0.5 MG/0.5ML pen; Inject 0.5 mg subcutaneously once a week.        Return in 2 months (on 3/30/2025) for Medicare Wellness Visit + Med Check, In Clinic.    The longitudinal plan of care for the diagnosis(es)/condition(s) as documented were addressed during this visit. Due to the added complexity in care, I will continue to support Virginia in the subsequent management and with ongoing continuity of care.    Subjective   Virginia is a 59 year old, presenting for the following health issues:  Weight Loss      Via the Health Maintenance questionnaire, the patient has reported the following services have been completed -Mammogram:  health 2024-03-12, this information has not been sent to the abstraction team.  History of Present Illness       Reason for visit:  Weight loss options    She eats 2-3 servings of fruits and vegetables daily.She consumes 1 sweetened beverage(s) daily.She exercises with enough effort to increase her heart rate 10 to 19 minutes per day.  She exercises with enough effort to increase her heart rate 3 or less days per week.   She is taking medications regularly.     Stopped GLP-1 agonist prior to liver transplant.  Used GLP-1 agonist for about 2 months.      7 months s/p liver transplant.  Dr. Cervantes - hepatologist is ok with GLP-1 agonist.      168-170 lbs felt the best at this weight.      Wt Readings  "from Last 4 Encounters:   01/30/25 84.8 kg (187 lb)   01/28/25 86.5 kg (190 lb 12.8 oz)   01/16/25 83.9 kg (185 lb)   11/05/24 83.9 kg (185 lb)     Body mass index is 31.12 kg/m .        Review of Systems  Constitutional, HEENT, cardiovascular, pulmonary, gi and gu systems are negative, except as otherwise noted.      Objective    /78   Pulse 109   Temp (!) 96.4  F (35.8  C) (Tympanic)   Resp 17   Ht 1.651 m (5' 5\")   Wt 84.8 kg (187 lb)   LMP 05/31/2006   SpO2 99%   BMI 31.12 kg/m    Body mass index is 31.12 kg/m .    Physical Exam     GENERAL: alert and no distress  RESP: lungs clear to auscultation - no rales, rhonchi or wheezes  CV: regular rate and rhythm, normal S1 S2, no S3 or S4, no murmur, click or rub, no peripheral edema  PSYCH: mentation appears normal, affect normal/bright        Signed Electronically by: Linda Macdonald, APRN CNP    "

## 2025-01-31 ENCOUNTER — MYC MEDICAL ADVICE (OUTPATIENT)
Dept: FAMILY MEDICINE | Facility: CLINIC | Age: 60
End: 2025-01-31
Payer: MEDICARE

## 2025-01-31 ENCOUNTER — TELEPHONE (OUTPATIENT)
Dept: FAMILY MEDICINE | Facility: CLINIC | Age: 60
End: 2025-01-31

## 2025-01-31 NOTE — TELEPHONE ENCOUNTER
[facility]  Fax:[phone]  Spec: [phone]  [place]: [phone]

## 2025-02-10 ENCOUNTER — VIRTUAL VISIT (OUTPATIENT)
Dept: NEUROLOGY | Facility: CLINIC | Age: 60
End: 2025-02-10
Payer: MEDICARE

## 2025-02-10 DIAGNOSIS — G40.109 LOCALIZATION-RELATED FOCAL EPILEPSY WITH SIMPLE PARTIAL SEIZURES (H): Primary | ICD-10-CM

## 2025-02-10 PROCEDURE — 99207 PR NO CHARGE LOS: CPT | Mod: 95 | Performed by: INTERNAL MEDICINE

## 2025-02-10 NOTE — PROGRESS NOTES
Virtual Visit Details    Type of service:  Video Visit   Video Start Time:  XX  Video End Time: XX    Originating Location (pt. Location): Other XX    Distant Location (provider location):  Off-site  Platform used for Video Visit: Suma    There was a problem with EPIC, I was unable to connect with the patient. She will be rescheduled appropriately.

## 2025-02-10 NOTE — TELEPHONE ENCOUNTER
Per call to insurance, PA was closed out because it just submitted incorrectly on their side.   Resubmitted PA.

## 2025-02-10 NOTE — NURSING NOTE
Current patient location: [address] [place] [id number]    Is the patient currently in the state of [place]? YES    Visit mode: VIDEO    If the visit is dropped, the patient can be reconnected by:TELEPHONE VISIT: Phone number:   Telephone Information:   Mobile [phone]       Will anyone else be joining the visit? NO  (If patient encounters technical issues they should call [phone]    Are changes needed to the allergy or medication list? Pt stated no med changes    Are refills needed on medications prescribed by this physician? NO    Rooming Documentation:  Questionnaire(s) completed    Reason for visit: No chief complaint on file.    Sandra HEARN

## 2025-02-10 NOTE — LETTER
2/10/2025       RE: Abiola Matute  [address] [address]     Dear Colleague,    Thank you for referring your patient, Abiola Matute, to the [facility]. Please see a copy of my visit note below.    Virtual Visit Details    Type of service:  Video Visit   Video Start Time:  XX  Video End Time: XX    Originating Location (pt. Location): Other XX    Distant Location (provider location):  Off-site  Platform used for Video Visit: AmWell    There was a problem with EPIC, I was unable to connect with the patient. She will be rescheduled appropriately.       Again, thank you for allowing me to participate in the care of your patient.      Sincerely,    Soo Lugo MD

## 2025-02-11 ENCOUNTER — TELEPHONE (OUTPATIENT)
Dept: NEUROLOGY | Facility: CLINIC | Age: 60
End: 2025-02-11
Payer: MEDICARE

## 2025-02-11 NOTE — TELEPHONE ENCOUNTER
PRIOR AUTHORIZATION DENIED    Medication: WEGOVY 0.25 MG/0.5ML SC SOAJ  Insurance Company: [employer] - Phone [phone] Fax [phone]  Denial Date: 2/11/2025    Denial Reason(s): Medicare part D does not cover weight loss agents/ treatment.    Appeal Information: If provider would like to appeal please provide a letter of medical necessity.

## 2025-02-11 NOTE — TELEPHONE ENCOUNTER
Left Voicemail (1st Attempt) and Sent Mychart (1st Attempt) for the patient to call back and schedule the following:    Appointment type: Return Seizure  Provider: Parish  Return date: next available  Specialty phone number: [phone]  Additional appointment(s) needed: NA  Additonal Notes: reschedule a follow up per KP Blount on 2/11/2025 at 1:05 PM

## 2025-02-12 ENCOUNTER — MYC MEDICAL ADVICE (OUTPATIENT)
Dept: FAMILY MEDICINE | Facility: CLINIC | Age: 60
End: 2025-02-12
Payer: MEDICARE

## 2025-02-13 ENCOUNTER — MYC REFILL (OUTPATIENT)
Dept: FAMILY MEDICINE | Facility: CLINIC | Age: 60
End: 2025-02-13
Payer: MEDICARE

## 2025-02-13 DIAGNOSIS — E66.811 CLASS 1 OBESITY WITH SERIOUS COMORBIDITY AND BODY MASS INDEX (BMI) OF 31.0 TO 31.9 IN ADULT, UNSPECIFIED OBESITY TYPE: ICD-10-CM

## 2025-02-13 RX ORDER — TRAZODONE HYDROCHLORIDE 50 MG/1
TABLET ORAL
Refills: 0 | OUTPATIENT
Start: 2025-02-13

## 2025-02-13 NOTE — TELEPHONE ENCOUNTER
Pharmacy comment: YOUR PATIENT REQUESTED AND EXPRESSLY CONSENTED FOR THIS RX TO BE FILLED AT [facility]. THIS INFORMATION IS BASED ON A PRIOR RX CLAIM. PLEASE VERIFY CURRENT THERAPY. 90-DAY SUPPLY REQUESTED.     On occasion takes it, and hasn't taken for a long time.     No refill needed. RX denied.     JALEN MEZA RN on 2/13/2025 at 9:15 AM   [facility]

## 2025-02-14 NOTE — TELEPHONE ENCOUNTER
I don't think she's started it.   Wants to make sure this is the cheapest option.   See [employer] message encounter.   Postponing until Monday.     JALEN MEZA RN on 2/14/2025 at 1:55 PM   [facility]

## 2025-02-14 NOTE — TELEPHONE ENCOUNTER
Clinic RN: Please investigate patient's chart or contact patient if the information cannot be found because  patient requesting 0.25 mg dose, but there are refills on file for 0.5 mg dose. Please contact patient and clarify which dose she is currently taking.    Amber Jung, KATERINAN, RN

## 2025-02-18 ENCOUNTER — HOSPITAL ENCOUNTER (OUTPATIENT)
Dept: CT IMAGING | Facility: CLINIC | Age: 60
Discharge: HOME | End: 2025-02-18
Attending: INTERNAL MEDICINE
Payer: MEDICARE

## 2025-02-18 ENCOUNTER — TELEPHONE (OUTPATIENT)
Dept: TRANSPLANT | Facility: CLINIC | Age: 60
End: 2025-02-18
Payer: MEDICARE

## 2025-02-18 DIAGNOSIS — Z94.4 LIVER REPLACED BY TRANSPLANT (H): ICD-10-CM

## 2025-02-18 PROCEDURE — 250N000009 HC RX 250: Performed by: INTERNAL MEDICINE

## 2025-02-18 PROCEDURE — 74177 CT ABD & PELVIS W/CONTRAST: CPT

## 2025-02-18 PROCEDURE — 250N000011 HC RX IP 250 OP 636: Performed by: INTERNAL MEDICINE

## 2025-02-18 RX ORDER — IOPAMIDOL 755 MG/ML
500 INJECTION, SOLUTION INTRAVASCULAR ONCE
Status: COMPLETED | OUTPATIENT
Start: 2025-02-18 | End: 2025-02-18

## 2025-02-18 RX ADMIN — SODIUM CHLORIDE 54 ML: 9 INJECTION, SOLUTION INTRAVENOUS at 15:12

## 2025-02-18 RX ADMIN — IOPAMIDOL 94 ML: 755 INJECTION, SOLUTION INTRAVENOUS at 15:12

## 2025-02-19 PROBLEM — L03.90 CELLULITIS: Status: ACTIVE | Noted: 2024-11-05

## 2025-02-19 PROBLEM — A39.9: Status: ACTIVE | Noted: 2024-07-13

## 2025-02-19 PROBLEM — A41.9 SEPSIS (H): Status: ACTIVE | Noted: 2024-06-30

## 2025-02-20 ENCOUNTER — DOCUMENTATION ONLY (OUTPATIENT)
Dept: LAB | Facility: CLINIC | Age: 60
End: 2025-02-20
Payer: MEDICARE

## 2025-02-20 DIAGNOSIS — Z13.29 SCREENING FOR THYROID DISORDER: ICD-10-CM

## 2025-02-20 DIAGNOSIS — Z13.220 SCREENING FOR LIPID DISORDERS: Primary | ICD-10-CM

## 2025-02-20 DIAGNOSIS — Z94.4 S/P LIVER TRANSPLANT (H): ICD-10-CM

## 2025-02-20 NOTE — PROGRESS NOTES
Recent Labs   Lab Test 02/14/23  0824 11/11/22  1235   CHOL 149 114   HDL 53 47*   LDL 80 52   TRIG 78 75

## 2025-02-24 ENCOUNTER — VIRTUAL VISIT (OUTPATIENT)
Dept: NEUROLOGY | Facility: CLINIC | Age: 60
End: 2025-02-24
Payer: MEDICARE

## 2025-02-24 DIAGNOSIS — G40.109 LOCALIZATION-RELATED FOCAL EPILEPSY WITH SIMPLE PARTIAL SEIZURES (H): Primary | ICD-10-CM

## 2025-02-24 PROCEDURE — G2211 COMPLEX E/M VISIT ADD ON: HCPCS | Mod: 95 | Performed by: INTERNAL MEDICINE

## 2025-02-24 PROCEDURE — 98007 SYNCH AUDIO-VIDEO EST HI 40: CPT | Performed by: INTERNAL MEDICINE

## 2025-02-24 NOTE — NURSING NOTE
Current patient location: [address] [place] [id number]    Is the patient currently in the state of [place]? YES    Visit mode: VIDEO    If the visit is dropped, the patient can be reconnected by:TELEPHONE VISIT: Phone number:   Telephone Information:   Mobile [phone]       Will anyone else be joining the visit? NO  (If patient encounters technical issues they should call [phone]    Are changes needed to the allergy or medication list? Pt stated no med changes    Are refills needed on medications prescribed by this physician? NO    Rooming Documentation:  Questionnaire(s) completed    Reason for visit: LORETO HEARN

## 2025-02-24 NOTE — LETTER
2/24/2025       RE: Abiola Matute  [address] [facility] [id number]     Dear Colleague,    Thank you for referring your patient, Abiola Matute, to the [facility] [place] [facility]. Please see a copy of my visit note below.    Virtual Visit Details    Type of service:  Video Visit   Video Start Time: 2:00 PM  Video End Time: 02;40 PM    Originating Location (pt. Location): Home    Distant Location (provider location):  On-site  Platform used for Video Visit: [employer]    INTERVAL HISTORY  Ms. Culver is here with her , who provides collateral history. She has been doing very well on levetiracetam and reports no significant side effects except for occasional tiredness. She mentions that many of her antirejection drugs are currently being weaned, and she would like to stop taking levetiracetam as well. She reports feeling very well following the discontinuation of tacrolimus and is reassured that it likely precipitated the seizures.    I discussed that although antirejection medications, especially tacrolimus, can precipitate seizures, epilepsy/seizures can also be independently associated with liver disease. I explained that with unprovoked seizures, medication weaning would typically be considered after 2 to 5 years. I expressed that I do not feel comfortable weaning off levetiracetam at this time, as it has been only 6 months since her last seizure, she is still on some antiseizure medications, and her liver numbers are not yet close to baseline. Although she is no longer taking tacrolimus, she is still on several medications that could reduce the seizure threshold.    She then discussed reducing the levetiracetam dose. She currently takes a total of 3000 mg daily. Given that her overall seizure risk has decreased with better liver health and the withdrawal of tacrolimus, I felt it was reasonable to reduce her total  levetiracetam dose. I suggested slowly weaning off the afternoon dose of levetiracetam, and they are agreeable to this plan. I also discussed the importance of avoiding any activities, especially driving, as losing consciousness could result in severe bodily injury or death. She is in agreement with this as well.    Epilepsy Data:  Ms Matute is a 59 year old woman with a medical history significant crohn's disease on infliximab and cirrhosis c/b HCC s/p recent liver transplant on 6/22/2024.   Her first seizure was in July 9th, witnessed by her . Prior to that she underwent a liver transplant on June 22, which was complicated by septic shock, requiring ICU admission. She was subsequently discharged on July 6. Her  observed that she initially made unusual movements with her right arm, then her left arm, and turned her head to the right before experiencing a generalized tonic-clonic convulsion. Following this event, she returned to the hospital, where she reportedly had a similar seizure aborted by lorazepam. An MRI brain showed trace subdural hemorrhage overlying the right occipital lobe without mass effect. There was mild pachymeningeal enhancement over the right convexity, suggesting a reactive change. Given her immunocompromised state and a history of fever one week prior with new-onset seizures, a lumbar puncture (LP) was recommended and showed no evidence of infection. The cerebrospinal fluid (CSF) analysis showed 0 WBC, protein 49, glucose 68, and was negative for HSV 1 and 2 and the meningitis panel. A CT scan of the head did not show progression of the trace subdural hemorrhage in the right occipital lobe. She was discharged with maintenance Keppra (levetiracetam) 750 mg twice daily.  The following day, her  witnessed several episodes characterized by staring and an inability to speak. She reportedly returned to her baseline state between the seizures and was aware that something unusual  had occurred. She could hear her  but was unable to respond.  She was readmitted, and the levetiracetam dose was increased to 1000 mg twice daily. She underwent continuous EEG monitoring from  to July 15, during which several electroclinical and electrographic seizures were captured. Despite attempts to use IV benzodiazepines and increase levetiracetam, seizure onset was poorly defined, showing bilateral activity in the central head region, more prominent on the right. Immunosuppression management was adjusted, and tacrolimus was switched to cyclosporine at discharge.  Since her discharge, she has had no episodes of concerning symptoms such as seizures, staring spells, automatisms, or loss of consciousness. She reports that she is mostly doing well on levetiracetam, although she feels somewhat less alert and slower in her responses. Despite these side effects, she wishes to continue with the current medication as she is able to cope with them.  She has no concerning , , or immediate  issues from her birth. 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 She denies any history of head trauma, strokes, brain malignancy, or a family history of epilepsy.           SPELL 1 - Staring, aware of her surroudings but couldn't follow commands. No automatisms  Duration: 7- 10  minutes. Frequent episodes from  -   SPELL 2 - right arm movement, right head turn and GTC < 5 minutes. 2 episodes     ASM - Levetiracetam, SE - brain fogginess     PRIOR WORK-UP  CSF panel - CSF 0 WBC, protein 49, glucose 68, HSV 1 and 2 and meningitis panel negative.  MRI brain 07/10:    1.  No recent infarct or intracranial mass.  2.  Trace subdural hemorrhage overlying the right occipital lobe without mass effect. There is mild pachymeningeal enhancement overlying the right convexity favored to reflect a reactive change.  3.  Nonspecific symmetric signal changes involving the  corticospinal tracts which can be seen in the setting of chronic hepatic encephalopathy.     cEEG monitoring 7/12 - 7/15  Activity normal during 7 structures of the recording.  Background activity consistent with mild to moderate diffuse encephalopathy during other portions of the recording.  Multiple electroclinical seizures were noted during first 2 days of recording.  Clinically patient appeared less responsive to commands, took longer to answer simple questions such as her name, and had difficulty remembering items.  Her  reported her to be confused and routinely pressed event marker.  EEG during these revealed rhythmic delta which gave way to rhythmic sharp waves which gradually slowed and became more periodic.  Discharge was seen in the central regions and bilaterally but was often more persistent on the right.  Electrographic seizures lasted as long as 15 minutes.  Seizures appear to stop after lorazepam 2 mg was administered on the evening of the second day of recording, and levetiracetam was increased to 2000 mg/day.  No further seizures were seen in the last 2 days of recording.      PHYSICAL EXAMINATION  This was limited by the virtual visit.  The patient did move all limbs spontaneously and could move her eyes in all of the visual fields.       Impression  Focal seizures with impaired awareness in the context of Liver transplant  Focal status epilepticus  Acute repetitive seizures  Ms. Matute is a 59-year-old woman with a history of acute repetitive seizures and focal status epilepticus in the context of a recent liver transplant. She was initially hospitalized for two witnessed generalized tonic-clonic seizures, for which levetiracetam was started. Following this, she experienced acute repetitive focal seizures and focal status epilepticus, which were captured on continuous EEG monitoring. These episodes abated with an increase in levetiracetam dosage from 750 mg twice daily to 1000 mg three  times daily, as well as a switch in immunosuppressive agents from tacrolimus to cyclosporine.  She is currently doing very well on levetiracetam 1000 mg twice daily with no significant side effects, although she reports feeling cognitively slower and having less of a filter. he mentions that many of her antirejection drugs are currently being weaned, and she would like to stop taking levetiracetam as well. She reports feeling very well following the discontinuation of tacrolimus and is reassured that it likely precipitated the seizures.    I discussed that although antirejection medications, especially tacrolimus, can precipitate seizures, epilepsy/seizures can also be independently associated with liver disease. I explained that with unprovoked seizures, medication weaning would typically be considered after 2 to 5 years. I expressed that I do not feel comfortable weaning off levetiracetam at this time, as it has been only 6 months since her last seizure, she is still on some antiseizure medications, and her liver numbers are not yet close to baseline. Although she is no longer taking tacrolimus, she is still on several medications that could reduce the seizure threshold.    She then discussed reducing the levetiracetam dose. She currently takes a total of 3000 mg daily. Given that her overall seizure risk has decreased with better liver health and the withdrawal of tacrolimus, I felt it was reasonable to reduce her total levetiracetam dose. I suggested slowly weaning off the afternoon dose of levetiracetam, and they are agreeable to this plan. I also discussed the importance of avoiding any activities, especially driving, as losing consciousness could result in severe bodily injury or death. She is in agreement with this as well.        PLAN  Continue Levetiracetam 1000 mg BID, decrease afternoon dose of levetiracetam to 500 mg for 1 month and discharge thereafter  Levetiracetam levels in 3 months   Abortive  medications - Intranasal diazepam 7.5 mg/ml 1 squirt in each nostril.  To be used for GTC greater than 3 to 4 minutes, focal seizures [smaller seizures] greater than 10 minutes, 3 or more GTC or focal seizures within 24 hours    RTC in 6 months      I spent 40  minutes in total today to provide comprehensive  medical care.    The longitudinal plan of care for the diagnosis(es)/condition(s) as documented were addressed during this visit. Due to the added complexity in care, I will continue to support [place] in the subsequent management and with ongoing continuity of care.    The rest of the time was spent with the patient in face to face interview. During this time key medical decisions were made with review of medical chart prior to visit, visit with patient, counseling/education, and post visit work, including documentation of note on the day of visit. I also personally reviewed prior investigations - laboratory and imaging related to the patients epilepsy care.  I addressed all questions the patient/caregiver raised in regards to epilepsy or related medical questions.         Again, thank you for allowing me to participate in the care of your patient.      Sincerely,    Soo Lugo MD

## 2025-02-24 NOTE — PROGRESS NOTES
Virtual Visit Details    Type of service:  Video Visit   Video Start Time: 2:00 PM  Video End Time: 02;40 PM    Originating Location (pt. Location): Home    Distant Location (provider location):  On-site  Platform used for Video Visit: [facility]    INTERVAL HISTORY  Ms. Culver is here with her , who provides collateral history. She has been doing very well on levetiracetam and reports no significant side effects except for occasional tiredness. She mentions that many of her antirejection drugs are currently being weaned, and she would like to stop taking levetiracetam as well. She reports feeling very well following the discontinuation of tacrolimus and is reassured that it likely precipitated the seizures.    I discussed that although antirejection medications, especially tacrolimus, can precipitate seizures, epilepsy/seizures can also be independently associated with liver disease. I explained that with unprovoked seizures, medication weaning would typically be considered after 2 to 5 years. I expressed that I do not feel comfortable weaning off levetiracetam at this time, as it has been only 6 months since her last seizure, she is still on some antiseizure medications, and her liver numbers are not yet close to baseline. Although she is no longer taking tacrolimus, she is still on several medications that could reduce the seizure threshold.    She then discussed reducing the levetiracetam dose. She currently takes a total of 3000 mg daily. Given that her overall seizure risk has decreased with better liver health and the withdrawal of tacrolimus, I felt it was reasonable to reduce her total levetiracetam dose. I suggested slowly weaning off the afternoon dose of levetiracetam, and they are agreeable to this plan. I also discussed the importance of avoiding any activities, especially driving, as losing consciousness could result in severe bodily injury or death. She is in agreement with this as  well.    Epilepsy Data:  Ms Matute is a 59 year old woman with a medical history significant crohn's disease on infliximab and cirrhosis c/b HCC s/p recent liver transplant on 6/22/2024.   Her first seizure was in July 9th, witnessed by her . Prior to that she underwent a liver transplant on June 22, which was complicated by septic shock, requiring ICU admission. She was subsequently discharged on July 6. Her  observed that she initially made unusual movements with her right arm, then her left arm, and turned her head to the right before experiencing a generalized tonic-clonic convulsion. Following this event, she returned to the hospital, where she reportedly had a similar seizure aborted by lorazepam. An MRI brain showed trace subdural hemorrhage overlying the right occipital lobe without mass effect. There was mild pachymeningeal enhancement over the right convexity, suggesting a reactive change. Given her immunocompromised state and a history of fever one week prior with new-onset seizures, a lumbar puncture (LP) was recommended and showed no evidence of infection. The cerebrospinal fluid (CSF) analysis showed 0 WBC, protein 49, glucose 68, and was negative for HSV 1 and 2 and the meningitis panel. A CT scan of the head did not show progression of the trace subdural hemorrhage in the right occipital lobe. She was discharged with maintenance Keppra (levetiracetam) 750 mg twice daily.  The following day, her  witnessed several episodes characterized by staring and an inability to speak. She reportedly returned to her baseline state between the seizures and was aware that something unusual had occurred. She could hear her  but was unable to respond.  She was readmitted, and the levetiracetam dose was increased to 1000 mg twice daily. She underwent continuous EEG monitoring from July 12 to July 15, during which several electroclinical and electrographic seizures were captured. Despite  attempts to use IV benzodiazepines and increase levetiracetam, seizure onset was poorly defined, showing bilateral activity in the central head region, more prominent on the right. Immunosuppression management was adjusted, and tacrolimus was switched to cyclosporine at discharge.  Since her discharge, she has had no episodes of concerning symptoms such as seizures, staring spells, automatisms, or loss of consciousness. She reports that she is mostly doing well on levetiracetam, although she feels somewhat less alert and slower in her responses. Despite these side effects, she wishes to continue with the current medication as she is able to cope with them.  She has no concerning , , or immediate  issues from her birth. 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 She denies any history of head trauma, strokes, brain malignancy, or a family history of epilepsy.           SPELL 1 - Staring, aware of her surroudings but couldn't follow commands. No automatisms  Duration: 7- 10  minutes. Frequent episodes from  -   SPELL 2 - right arm movement, right head turn and GTC < 5 minutes. 2 episodes     ASM - Levetiracetam, SE - brain fogginess     PRIOR WORK-UP  CSF panel - CSF 0 WBC, protein 49, glucose 68, HSV 1 and 2 and meningitis panel negative.  MRI brain 07/10:    1.  No recent infarct or intracranial mass.  2.  Trace subdural hemorrhage overlying the right occipital lobe without mass effect. There is mild pachymeningeal enhancement overlying the right convexity favored to reflect a reactive change.  3.  Nonspecific symmetric signal changes involving the corticospinal tracts which can be seen in the setting of chronic hepatic encephalopathy.     cEEG monitoring  - 7/15  Activity normal during 7 structures of the recording.  Background activity consistent with mild to moderate diffuse encephalopathy during other portions of the recording.  Multiple  electroclinical seizures were noted during first 2 days of recording.  Clinically patient appeared less responsive to commands, took longer to answer simple questions such as her name, and had difficulty remembering items.  Her  reported her to be confused and routinely pressed event marker.  EEG during these revealed rhythmic delta which gave way to rhythmic sharp waves which gradually slowed and became more periodic.  Discharge was seen in the central regions and bilaterally but was often more persistent on the right.  Electrographic seizures lasted as long as 15 minutes.  Seizures appear to stop after lorazepam 2 mg was administered on the evening of the second day of recording, and levetiracetam was increased to 2000 mg/day.  No further seizures were seen in the last 2 days of recording.      PHYSICAL EXAMINATION  This was limited by the virtual visit.  The patient did move all limbs spontaneously and could move her eyes in all of the visual fields.       Impression  Focal seizures with impaired awareness in the context of Liver transplant  Focal status epilepticus  Acute repetitive seizures  Ms. Matute is a 59-year-old woman with a history of acute repetitive seizures and focal status epilepticus in the context of a recent liver transplant. She was initially hospitalized for two witnessed generalized tonic-clonic seizures, for which levetiracetam was started. Following this, she experienced acute repetitive focal seizures and focal status epilepticus, which were captured on continuous EEG monitoring. These episodes abated with an increase in levetiracetam dosage from 750 mg twice daily to 1000 mg three times daily, as well as a switch in immunosuppressive agents from tacrolimus to cyclosporine.  She is currently doing very well on levetiracetam 1000 mg twice daily with no significant side effects, although she reports feeling cognitively slower and having less of a filter. he mentions that many of her  antirejection drugs are currently being weaned, and she would like to stop taking levetiracetam as well. She reports feeling very well following the discontinuation of tacrolimus and is reassured that it likely precipitated the seizures.    I discussed that although antirejection medications, especially tacrolimus, can precipitate seizures, epilepsy/seizures can also be independently associated with liver disease. I explained that with unprovoked seizures, medication weaning would typically be considered after 2 to 5 years. I expressed that I do not feel comfortable weaning off levetiracetam at this time, as it has been only 6 months since her last seizure, she is still on some antiseizure medications, and her liver numbers are not yet close to baseline. Although she is no longer taking tacrolimus, she is still on several medications that could reduce the seizure threshold.    She then discussed reducing the levetiracetam dose. She currently takes a total of 3000 mg daily. Given that her overall seizure risk has decreased with better liver health and the withdrawal of tacrolimus, I felt it was reasonable to reduce her total levetiracetam dose. I suggested slowly weaning off the afternoon dose of levetiracetam, and they are agreeable to this plan. I also discussed the importance of avoiding any activities, especially driving, as losing consciousness could result in severe bodily injury or death. She is in agreement with this as well.        PLAN  Continue Levetiracetam 1000 mg BID, decrease afternoon dose of levetiracetam to 500 mg for 1 month and discharge thereafter  Levetiracetam levels in 3 months   Abortive medications - Intranasal diazepam 7.5 mg/ml 1 squirt in each nostril.  To be used for GTC greater than 3 to 4 minutes, focal seizures [smaller seizures] greater than 10 minutes, 3 or more GTC or focal seizures within 24 hours    RTC in 6 months      I spent 40  minutes in total today to provide comprehensive   medical care.    The longitudinal plan of care for the diagnosis(es)/condition(s) as documented were addressed during this visit. Due to the added complexity in care, I will continue to support [place] in the subsequent management and with ongoing continuity of care.    The rest of the time was spent with the patient in face to face interview. During this time key medical decisions were made with review of medical chart prior to visit, visit with patient, counseling/education, and post visit work, including documentation of note on the day of visit. I also personally reviewed prior investigations - laboratory and imaging related to the patients epilepsy care.  I addressed all questions the patient/caregiver raised in regards to epilepsy or related medical questions.

## 2025-02-25 ENCOUNTER — LAB (OUTPATIENT)
Dept: LAB | Facility: CLINIC | Age: 60
End: 2025-02-25
Payer: MEDICARE

## 2025-02-25 DIAGNOSIS — Z13.220 SCREENING FOR LIPID DISORDERS: ICD-10-CM

## 2025-02-25 DIAGNOSIS — Z13.29 SCREENING FOR THYROID DISORDER: ICD-10-CM

## 2025-02-25 DIAGNOSIS — K70.31 ALCOHOLIC CIRRHOSIS OF LIVER WITH ASCITES (H): ICD-10-CM

## 2025-02-25 DIAGNOSIS — Z94.4 S/P LIVER TRANSPLANT (H): ICD-10-CM

## 2025-02-25 DIAGNOSIS — F10.11 ALCOHOL ABUSE, IN REMISSION: ICD-10-CM

## 2025-02-25 DIAGNOSIS — G40.109 LOCALIZATION-RELATED FOCAL EPILEPSY WITH SIMPLE PARTIAL SEIZURES (H): ICD-10-CM

## 2025-02-25 DIAGNOSIS — Z94.4 LIVER REPLACED BY TRANSPLANT (H): ICD-10-CM

## 2025-02-25 LAB
ALBUMIN SERPL BCG-MCNC: 3.9 G/DL (ref 3.5–5.2)
ALP SERPL-CCNC: 167 U/L (ref 40–150)
ALT SERPL W P-5'-P-CCNC: 23 U/L (ref 0–50)
ANION GAP SERPL CALCULATED.3IONS-SCNC: 10 MMOL/L (ref 7–15)
AST SERPL W P-5'-P-CCNC: 25 U/L (ref 0–45)
BILIRUB DIRECT SERPL-MCNC: 0.24 MG/DL (ref 0–0.3)
BILIRUB SERPL-MCNC: 0.6 MG/DL
BUN SERPL-MCNC: 28.2 MG/DL (ref 8–23)
CALCIUM SERPL-MCNC: 9 MG/DL (ref 8.8–10.4)
CHLORIDE SERPL-SCNC: 105 MMOL/L (ref 98–107)
CHOLEST SERPL-MCNC: 234 MG/DL
CREAT SERPL-MCNC: 0.93 MG/DL (ref 0.51–0.95)
CYCLOSPORINE BLD LC/MS/MS-MCNC: 164 UG/L (ref 50–400)
EGFRCR SERPLBLD CKD-EPI 2021: 70 ML/MIN/1.73M2
ERYTHROCYTE [DISTWIDTH] IN BLOOD BY AUTOMATED COUNT: 13.7 % (ref 10–15)
FASTING STATUS PATIENT QL REPORTED: YES
FASTING STATUS PATIENT QL REPORTED: YES
GLUCOSE SERPL-MCNC: 91 MG/DL (ref 70–99)
HCO3 SERPL-SCNC: 24 MMOL/L (ref 22–29)
HCT VFR BLD AUTO: 31.9 % (ref 35–47)
HDLC SERPL-MCNC: 57 MG/DL
HGB BLD-MCNC: 10.9 G/DL (ref 11.7–15.7)
LDLC SERPL CALC-MCNC: 150 MG/DL
LEVETIRACETAM SERPL-MCNC: 39.5 ÂΜG/ML (ref 10–40)
MAGNESIUM SERPL-MCNC: 1.6 MG/DL (ref 1.7–2.3)
MCH RBC QN AUTO: 29.5 PG (ref 26.5–33)
MCHC RBC AUTO-ENTMCNC: 34.2 G/DL (ref 31.5–36.5)
MCV RBC AUTO: 86 FL (ref 78–100)
NONHDLC SERPL-MCNC: 177 MG/DL
PHOSPHATE SERPL-MCNC: 4.4 MG/DL (ref 2.5–4.5)
PLATELET # BLD AUTO: 247 10E3/UL (ref 150–450)
POTASSIUM SERPL-SCNC: 4 MMOL/L (ref 3.4–5.3)
PROT SERPL-MCNC: 6.7 G/DL (ref 6.4–8.3)
RBC # BLD AUTO: 3.69 10E6/UL (ref 3.8–5.2)
SODIUM SERPL-SCNC: 139 MMOL/L (ref 135–145)
T4 FREE SERPL-MCNC: 1.12 NG/DL (ref 0.9–1.7)
TME LAST DOSE: NORMAL H
TME LAST DOSE: NORMAL H
TRIGL SERPL-MCNC: 134 MG/DL
TSH SERPL DL<=0.005 MIU/L-ACNC: 4.64 UIU/ML (ref 0.3–4.2)
WBC # BLD AUTO: 5 10E3/UL (ref 4–11)

## 2025-02-25 PROCEDURE — 83735 ASSAY OF MAGNESIUM: CPT

## 2025-02-25 PROCEDURE — 84439 ASSAY OF FREE THYROXINE: CPT | Mod: GZ

## 2025-02-25 PROCEDURE — 80053 COMPREHEN METABOLIC PANEL: CPT

## 2025-02-25 PROCEDURE — 84443 ASSAY THYROID STIM HORMONE: CPT | Mod: GZ

## 2025-02-25 PROCEDURE — 36415 COLL VENOUS BLD VENIPUNCTURE: CPT

## 2025-02-25 PROCEDURE — 80177 DRUG SCRN QUAN LEVETIRACETAM: CPT

## 2025-02-25 PROCEDURE — 80158 DRUG ASSAY CYCLOSPORINE: CPT

## 2025-02-25 PROCEDURE — 80061 LIPID PANEL: CPT

## 2025-02-25 PROCEDURE — 85027 COMPLETE CBC AUTOMATED: CPT

## 2025-02-25 PROCEDURE — G0480 DRUG TEST DEF 1-7 CLASSES: HCPCS | Mod: 90

## 2025-02-25 PROCEDURE — 82248 BILIRUBIN DIRECT: CPT

## 2025-02-25 PROCEDURE — 99000 SPECIMEN HANDLING OFFICE-LAB: CPT

## 2025-02-25 PROCEDURE — 84100 ASSAY OF PHOSPHORUS: CPT

## 2025-02-26 DIAGNOSIS — G40.109 LOCALIZATION-RELATED FOCAL EPILEPSY WITH SIMPLE PARTIAL SEIZURES (H): Primary | ICD-10-CM

## 2025-02-26 DIAGNOSIS — Z94.4 S/P LIVER TRANSPLANT (H): ICD-10-CM

## 2025-02-26 RX ORDER — CYCLOSPORINE 100 MG/1
100 CAPSULE, LIQUID FILLED ORAL 2 TIMES DAILY
Qty: 60 CAPSULE | Refills: 11 | Status: SHIPPED | OUTPATIENT
Start: 2025-02-26

## 2025-02-26 RX ORDER — CYCLOSPORINE 25 MG/1
25 CAPSULE, LIQUID FILLED ORAL 2 TIMES DAILY PRN
Status: SHIPPED
Start: 2025-02-26

## 2025-02-26 NOTE — TELEPHONE ENCOUNTER
ISSUE:   Cyclosporine level 164 on 2/25, goal 100-150, dose 125 mg BID.    PLAN:   Call Patient and confirm this was an accurate 12-hour trough.   Verify Cyclosporine dose 3125 mg BID.   Confirm no new medications or or missed doses.   Confirm no new illness / infection / diarrhea.   If accurate trough and accurate dose, decrease Cyclosporine dose to 100 mg BID     Is this more than a 50% increase or decrease in current IS dose: No  If YES, justification:     Repeat labs in 3-4 weeks.  *If > 50% change in immunosuppression dose, repeat labs in 1 week.   Zayra Paulino RN      OUTCOME:   Patient responds through [facility], they confirm accurate trough level and current dose 125 mg BID.   Patient confirmed dose change to 100 mg BID.  Patient agreed to repeat labs in 3-4 weeks.   Orders sent to preferred pharmacy for dose change and lab for repeat labs.   Patient voiced understanding of plan through [facility].     Michelle Starks LPN

## 2025-03-13 ENCOUNTER — INFUSION THERAPY VISIT (OUTPATIENT)
Dept: INFUSION THERAPY | Facility: CLINIC | Age: 60
End: 2025-03-13
Attending: PHYSICIAN ASSISTANT
Payer: MEDICARE

## 2025-03-13 VITALS
SYSTOLIC BLOOD PRESSURE: 111 MMHG | RESPIRATION RATE: 18 BRPM | WEIGHT: 191 LBS | DIASTOLIC BLOOD PRESSURE: 76 MMHG | TEMPERATURE: 98.2 F | BODY MASS INDEX: 31.78 KG/M2 | HEART RATE: 71 BPM | OXYGEN SATURATION: 100 %

## 2025-03-13 DIAGNOSIS — K50.119 CROHN'S DISEASE OF PERIANAL REGION WITH COMPLICATION (H): Primary | ICD-10-CM

## 2025-03-13 PROCEDURE — 258N000003 HC RX IP 258 OP 636: Performed by: INTERNAL MEDICINE

## 2025-03-13 RX ORDER — DIPHENHYDRAMINE HCL 25 MG
25 CAPSULE ORAL ONCE
Start: 2025-05-08 | End: 2025-05-08

## 2025-03-13 RX ORDER — DIPHENHYDRAMINE HYDROCHLORIDE 50 MG/ML
25 INJECTION, SOLUTION INTRAMUSCULAR; INTRAVENOUS
Start: 2025-05-08

## 2025-03-13 RX ORDER — EPINEPHRINE 1 MG/ML
0.3 INJECTION, SOLUTION INTRAMUSCULAR; SUBCUTANEOUS EVERY 5 MIN PRN
OUTPATIENT
Start: 2025-05-08

## 2025-03-13 RX ORDER — DIPHENHYDRAMINE HYDROCHLORIDE 50 MG/ML
50 INJECTION, SOLUTION INTRAMUSCULAR; INTRAVENOUS
Start: 2025-05-08

## 2025-03-13 RX ORDER — MEPERIDINE HYDROCHLORIDE 25 MG/ML
25 INJECTION INTRAMUSCULAR; INTRAVENOUS; SUBCUTANEOUS
OUTPATIENT
Start: 2025-05-08

## 2025-03-13 RX ORDER — ALBUTEROL SULFATE 90 UG/1
1-2 INHALANT RESPIRATORY (INHALATION)
Start: 2025-05-08

## 2025-03-13 RX ORDER — ACETAMINOPHEN 325 MG/1
650 TABLET ORAL ONCE
Start: 2025-05-08 | End: 2025-05-08

## 2025-03-13 RX ORDER — METHYLPREDNISOLONE SODIUM SUCCINATE 40 MG/ML
40 INJECTION INTRAMUSCULAR; INTRAVENOUS
Start: 2025-05-08

## 2025-03-13 RX ORDER — ALBUTEROL SULFATE 0.83 MG/ML
2.5 SOLUTION RESPIRATORY (INHALATION)
OUTPATIENT
Start: 2025-05-08

## 2025-03-13 RX ADMIN — SODIUM CHLORIDE 250 ML: 0.9 INJECTION, SOLUTION INTRAVENOUS at 11:05

## 2025-03-13 RX ADMIN — SODIUM CHLORIDE 900 MG: 9 INJECTION, SOLUTION INTRAVENOUS at 11:22

## 2025-03-13 NOTE — PROGRESS NOTES
Infusion Nursing Note:  Abiola Matute presents today for Remicade.    Patient seen by provider today: No   present during visit today: Not Applicable.    Note: N/A.      Intravenous Access:  Peripheral IV placed.    Treatment Conditions:  Results reviewed, labs MET treatment parameters, ok to proceed with treatment.  Biological Infusion Checklist:  ~~~ NOTE: If the patient answers yes to any of the questions below, hold the infusion and contact ordering provider or on-call provider.    Have you recently had an elevated temperature, fever, chills, productive cough, coughing for 3 weeks or longer or hemoptysis,  abnormal vital signs, night sweats,  chest pain or have you noticed a decrease in your appetite, unexplained weight loss or fatigue? No  Do you have any open wounds or new incisions? No  Do you have any upcoming hospitalizations or surgeries? Does not include esophagogastroduodenoscopy, colonoscopy, endoscopic retrograde cholangiopancreatography (ERCP), endoscopic ultrasound (EUS), dental procedures or joint aspiration/steroid injections No  Do you currently have any signs of illness or infection or are you on any antibiotics? No  Have you had any new, sudden or worsening abdominal pain? No  Have you or anyone in your household received a live vaccination in the past 4 weeks? Please note: No live vaccines while on biologic/chemotherapy until 6 months after the last treatment. Patient can receive the flu vaccine (shot only), pneumovax and the Covid vaccine. It is optimal for the patient to get these vaccines mid cycle, but they can be given at any time as long as it is not on the day of the infusion. No  Have you recently been diagnosed with any new nervous system diseases (ie. Multiple sclerosis, Guillain Cameron, seizures, neurological changes) or cancer diagnosis? Are you on any form of radiation or chemotherapy? No  Are you pregnant or breast feeding or do you have plans of pregnancy in the  future? No  Have you been having any signs of worsening depression or suicidal ideations?  (benlysta only) No  Have there been any other new onset medical symptoms? No  Have you had any new blood clots? (IVIG only) No      Post Infusion Assessment:  Patient tolerated infusion without incident.  Blood return noted pre and post infusion.  Site patent and intact, free from redness, edema or discomfort.  No evidence of extravasations.  Access discontinued per protocol.       Discharge Plan:   Patient declined prescription refills.  Discharge instructions reviewed with: Patient.  Patient and/or family verbalized understanding of discharge instructions and all questions answered.  AVS to patient via [employer].  Patient will return 5/8 for next appointment.   Patient discharged in stable condition accompanied by: self.  Departure Mode: Ambulatory.      Lucas Landry RN

## 2025-04-01 ENCOUNTER — MYC MEDICAL ADVICE (OUTPATIENT)
Dept: FAMILY MEDICINE | Facility: CLINIC | Age: 60
End: 2025-04-01
Payer: MEDICARE

## 2025-04-07 ENCOUNTER — VIRTUAL VISIT (OUTPATIENT)
Dept: GASTROENTEROLOGY | Facility: CLINIC | Age: 60
End: 2025-04-07
Attending: INTERNAL MEDICINE
Payer: MEDICARE

## 2025-04-07 DIAGNOSIS — K50.119 CROHN'S DISEASE OF PERIANAL REGION WITH COMPLICATION (H): Primary | ICD-10-CM

## 2025-04-07 PROCEDURE — 98006 SYNCH AUDIO-VIDEO EST MOD 30: CPT | Performed by: INTERNAL MEDICINE

## 2025-04-07 PROCEDURE — 1125F AMNT PAIN NOTED PAIN PRSNT: CPT | Performed by: INTERNAL MEDICINE

## 2025-04-07 NOTE — PATIENT INSTRUCTIONS
It was a pleasure meeting with you today and discussing your healthcare plan. Below is a summary of what we covered:    -- Start taking metamucil wafers daily.     -- Plan for colonoscopy summer 2025    -- Infliximab level with May infusion.     Follow-up in 6 months      Please see below for any additional questions and scheduling guidelines.    Sign up for [employer]: [employer] patient portal serves as a secure platform for accessing your medical records from the [facility]. Additionally, [employer] facilitates easy, timely, and secure messaging with your care team. If you have not signed up, you may do so by using the provided code or calling [phone].    Coordinating your care after your visit:  There are multiple options for scheduling your follow-up care based on your provider's recommendation.    How do I schedule a follow-up clinic appointment:   After your appointment, you may receive scheduling assistance with the Clinic Coordinators by having a seat in the waiting room and a Clinic Coordinator will call you up to schedule.  Virtual visits or after you leave the clinic:  Your provider has placed a follow-up order in the [employer] portal for scheduling your return appointment. A member of the scheduling team will contact you to schedule.  Multispectral [facility]t Scheduling: Timely scheduling through [employer] is advised to ensure appointment availability.   Call to schedule: You may schedule your follow-up appointment(s) by calling [phone], option 1.    How do I schedule my endoscopy or colonoscopy procedure:  If a procedure, such as a colonoscopy or upper endoscopy was ordered by your provider, the scheduling team will contact you to schedule this procedure. Or you may choose to call to schedule at   [phone], option 2.  Please allow 20-30 minutes when scheduling a procedure.    How do I get my blood work done? To get your blood work done, you need to schedule a lab appointment at an [facility]  Laboratory. There are multiple ways to schedule:   At the clinic: The Clinic Coordinator you meet after your visit can help you schedule a lab appointment.   [employer] scheduling: [employer] offers online lab scheduling at all [facility] laboratory locations.   Call to schedule: You can call [phone] to schedule your lab appointment.    How do I schedule my imaging study: To schedule imaging studies, such as CT scans, ultrasounds, MRIs, or X-rays, contact Imaging Services at [phone].    How do I schedule a referral to another doctor: If your provider recommended a referral to another specialist(s), the referral order was placed by your provider. You will receive a phone call to schedule this referral, or you may choose to call the number attached to the referral to self-schedule.    For Post-Visit Question(s):  For any inquiries following today's visit:  Please utilize [employer] messaging and allow 48 hours for reply or contact the Call Center during normal business hours at [phone], option 3.  For Emergent After-hours questions, contact the On-Call GI Fellow through the [facility]  at [phone].  In addition, you may contact your Nurse directly using the provided contact information.    Test Results:  Test results will be accessible via [employer] in compliance with the 21st Century Cures Act. This means that your results will be available to you at the same time as your provider. Often you may see your results before your provider does. Results are reviewed by staff within two weeks with communication follow-up. Results may be released in the patient portal prior to your care team review.    Prescription Refill(s):  Medication prescribed by your provider will be addressed during your visit. For future refills, please coordinate with your pharmacy. If you have not had a recent clinic visit or routine labs, for your safety, your provider may not be able to refill your prescription.

## 2025-04-07 NOTE — NURSING NOTE
Current patient location: [address] [place] [id number]    Is the patient currently in the state of [place]? YES    Visit mode: VIDEO    If the visit is dropped, the patient can be reconnected by:VIDEO VISIT: Text to cell phone:   Telephone Information:   Mobile [phone]    and VIDEO VISIT: Send to e-mail at: [email or web address]    Will anyone else be joining the visit? NO  (If patient encounters technical issues they should call [phone])    Are changes needed to the allergy or medication list? No    Are refills needed on medications prescribed by this physician? NO    Rooming Documentation:  Questionnaire(s) completed    Reason for visit: RECHECK    Jen HEARN

## 2025-04-07 NOTE — PROGRESS NOTES
IBD CLINIC VISIT    CC/REFERRING MD:  Referred Self  REASON FOR CONSULTATION: Crohn's.     ASSESSMENT/PLAN:  59 year old female with Crohn's with perianal fistula s/p seton and cirrhosis c/b HCC.     1.  Crohn's disease with perianal fistula  Current Medications: Infliximab 10mg/kg every 8 weeks (resumed 9/2024 after transplant)  Current disease activity: HBI  4, clinical remission  Last endoscopic disease activity: 32/2024 - SES-CD of 0 (while off infliximab)     Infliximab history- started May 2019. Dose increase to 10 mg/kg every 8 weeks but had continued active disease, dose increase to 10 mg/kg every 4 weeks 11/2020. Trough level high at >34 in 3/2023 in the setting of new HCC. Dose reduced to 5 mg/kg every 4 weeks in 6/2023 to attempt to achieve a balance between IBD control and not too much immunosuppression in the setting of HCC.  Unfortunately, she switched to Medicare in late 2023 and was unable to obtain coverage for infliximab.  She was been off infliximab since 11/2023 to 9/2024.  Medicare finally approved infliximab but at 5 mg/kg every 8 weeks which she resumed before transplant.     Doing well on IFX. No antibodies to infliximab despite gap.  Plan to check infliximab predict with May infusion.  She is not having any clear clinical symptoms related to Crohn's disease at this time.  Current bowel pattern more suggestive of irritable bowel syndrome.  Will proceed with plan to obtain colonoscopy 1 year after her liver transplant to restage her Crohn's disease.  For her irritable bowel syndrome, I have encouraged her to use Metamucil daily.    -- Continue infliximab   -- Infliximab predict with May infusion  -- Colonoscopy 1 year after transplant (summer 2025)  -- Daily metamucil use for IBS symptoms     2. Cirrhosis with HCC s/p liver transplant  s/p radioembolization by IR 3/2023. Liver transplant 6/2024, currently on cyclosporine (had seizure with tacrolimus). One admission for fever s/p transplant  without finding of infection.   -- Follow-up with Dr. Cervantes in the liver clinic     IBD Dysplasia surveillance: Predominantly perianal Crohn's, unclear colonic involvement.  Given diagnosis of Crohn's disease and immune suppression from liver transplant, favor screening colonoscopies every 3 to 5 years.    Farhan Schilling MD MS    [facility]  Inflammatory Bowel Disease Program  Division of Gastroenterology, Hepatology and Nutrition       IBD HISTORY  Age at diagnosis: 53 (4/2019)  Extent of disease: miguel angel-anal, anal  Disease phenotype: Miguel Angel-anal fistula  Miguel Angel-anal disease: Yes  Prior IBD surgeries: No. Seton placements  Prior IBD Medications: None    DRUG MONITORING  TPMT enzyme activity:     6-TGN/6-MMPN levels:    Biologic concentration:  10/2/2019: IFX 0.8, anti-IFX antibodies: 451 (Esoterix)  1/22/2020 infliximab level 2.3, no antibodies (this is an 8-week trough at 10 mg/kg)  3/4/21 IFX 25.1, no antibodies (4 week trough on 10mg/kg)  2/27/2023 IFX >34, no antibody (4 week trough on 10mg/kg)    sIBDQ:   IBDQ Score Date IBDQ - Total Score  (Higher score better)   4/7/2025   9:19 AM 53   6/13/2023   1:28 PM 45   5/31/2023   3:26 PM 51   9/30/2022   6:54 AM 49       HPI:   Started Wegovy in March 2025.   Follow-up with neurology - no additional seizures. Likely from tacrolimus. Weaning off antiepileptics.   Follow-up with liver transplant - no rejection, overall doing well.     Currently having variable stools per day. On a slow day 2 stools per day. Some days up to 6, however, this is only about once a week. Typically no days without any stools, but did have one a few days prior to this visit. Typically mostly formed, but still loose. No blood or mucus.     She is taking metamucil wafers every other day.     HBI:  Overall patient well being (prior day): 0 (Very well)  Abdominal pain (prior day): 0 (None)  Number of liquid or soft stools (prior day): 3 (1 point per stool)  Abdominal  mass on exam: --  Complications (1 point for each):   Arthralgia    Remission <5  Mild activity 5-7  Moderate activity 8-16  Severe > 16       ROS:    Constitutional, HEENT, cardiovascular, pulmonary, GI, , musculoskeletal, neuro, skin, endocrine and psych systems are negative, except as otherwise noted.     PERTINENT PAST MEDICAL HISTORY:  Past Medical History:   Diagnosis Date    Alcohol abuse     Alcoholic cirrhosis of liver with ascites     Anal fistula     Atypical ductal hyperplasia of breast 09/10/2010    ERT not recommended -left - and flat epithelial atypia-scheduled for breast biopsy 9/17/2010     Bifid uvula     Cellulitis 11/05/2024    Cholelithiasis     Contact perianal dermatitis and other eczema     recurrent - clobetasol     Crohn disease     Fear of flying      - gets Ativan prn.     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 Varma     BIOPSY LIVER  2019    COLONOSCOPY  2006    COLONOSCOPY N/A 12/23/2014    Procedure: COMBINED COLONOSCOPY, SINGLE OR MULTIPLE BIOPSY/POLYPECTOMY BY BIOPSY;  Surgeon: Diane Fleming MD;  Location:  GI    COLONOSCOPY N/A 12/5/2019    Procedure: COLONOSCOPY, WITH POLYPECTOMY AND BIOPSY;  Surgeon: Farhan Schilling MD;  Location:  GI    COLONOSCOPY N/A 2/27/2024    Procedure: COLONOSCOPY, WITH POLYPECTOMY AND BIOPSY;  Surgeon:  Michelle Diaz MD;  Location: [facility] OR    ENDOSCOPIC RETROGRADE CHOLANGIOPANCREATOGRAM N/A 2020    Procedure: ENDOSCOPIC RETROGRADE CHOLANGIOPANCREATOGRAPHY WITH, sledge removal,sphincterotomy, stent in gallbladder and pancreatic duct stent, and balloon dilation;  Surgeon: Guru Isac Kraft MD;  Location: [facility] OR    ESOPHAGOSCOPY, GASTROSCOPY, DUODENOSCOPY (EGD), COMBINED N/A 2019    Procedure: ESOPHAGOGASTRODUODENOSCOPY (EGD);  Surgeon: Farhan Schilling MD;  Location: [facility] GI    ESOPHAGOSCOPY, GASTROSCOPY, DUODENOSCOPY (EGD), COMBINED N/A 2023    Procedure: Esophagoscopy, gastroscopy, duodenoscopy (EGD), combined;  Surgeon: Jeannette Cervantes MD;  Location: [facility] GI    EXAM UNDER ANESTHESIA ANUS N/A 3/28/2019    Procedure: EXAM UNDER ANESTHESIA ANUS;  Surgeon: Diane Fleming MD;  Location:  OR    EXAM UNDER ANESTHESIA ANUS N/A 2021    Procedure: EXAM UNDER ANESTHESIA OF ANUS, SETON PLACEMENT, EXCISION OF SKIN BRIDGE;  Surgeon: Avtar Nam MD;  [place] [facility] [place]    EXAM UNDER ANESTHESIA ANUS N/A 2023    Procedure: EXAM UNDER ANESTHESIA, ANUS, SETON EXCHANGE, partial fistulotomy;  Surgeon: Mary Dugan MD;  Location: [facility] [place]    HYSTERECTOMY, VAGINAL  2006    with Dr. Licha Zhou - with BSO for fibroids     IR GALLBLADDER DRAIN PLACEMENT  2020    IR SIRT (SELECTIVE INTERNAL RADIO THERAPY)  2023    IR VISCERAL ANGIOGRAM  2023    IR VISCERAL EMBOLIZATION  2023    LAPAROSCOPIC ABLATION LIVER TUMOR N/A 2023    Procedure: Diagnostic Laparoscopy, Laparoscopic microwave ablation of liver tumor intraoperative ultrasound of liver, lysis of adhesions 1 hour,;  Surgeon: Albino Saavedra MD;  Location: [facility] OR    OPEN REDUCTION INTERNAL FIXATION ANKLE Left at age 27    plates and screws removed at age 36    Pelviscopy with removal of bilateral hydrosalpinges.  04/15/2010    TRANSPLANT LIVER RECIPIENT  DONOR N/A 2024     Procedure: Transplant liver recipient  donor, with biliary stent placement;  Surgeon: Pravin Ball MD;  Location: [facility] OR    ZZC APPENDECTOMY  at age 14       PREVIOUS ENDOSCOPY:  2024 colonoscopy - SES-CD score 0, 2 polyps removed (1 in transverse colon and 1 in rectum -  returned as lymphoid aggregate and normal colon mucosa ).     3/7/19: Flex sig: Colon and rectum appeared normal. 2 large deep ulcers extending from the anal cnaal to left perianal area.     10/23/18: Colonoscopy: Endoscopically normal ileum.  Mild pan colonic congestion with contact friability thought to be related to portal hypertension and thrombocytopenia.  Prior anal verge ulcer is healed    14: Colonoscopy: Perianal skin tag noted.  Ileum normal, colon normal.    ALLERGIES:     Allergies   Allergen Reactions    Blood Transfusion Related (Informational Only) Other (See Comments)     Patient has a history of a clinically significant antibody against RBC antigens.  Anti Jka identified at [facility] on 2024. A delay in compatible RBCs may occur.    Dapsone Other (See Comments)     Symmetric drug-related intertriginous and flexural exanthema (SDRIFE)    Fish Oil      Redness and itching around eye area only - went away when fish oil capsules stopped     Metronidazole      pain/itching    Ppd [Tuberculin Purified Protein Derivative]     Sulfa Antibiotics      hives    Terbinafine And Related      Lamisil = mild urticarial reaction       PERTINENT MEDICATIONS:    Current Outpatient Medications:     Biotin 5000 MCG TABS, Take 1 tablet by mouth daily., Disp: , Rfl:     COMPOUNDED NON-CONTROLLED SUBSTANCE (CMPD RX) - PHARMACY TO MIX COMPOUNDED MEDICATION, Compounded Semaglutide 0.25 mg subcutaneous injection once weekly for 28 days., Disp: 4 each, Rfl: 0    COMPOUNDED NON-CONTROLLED SUBSTANCE (CMPD RX) - PHARMACY TO MIX COMPOUNDED MEDICATION, Compounded Semaglutide 0.5 mg subcutaneous injection once weekly for 28  days., Disp: 4 each, Rfl: 2    cycloSPORINE modified (GENERIC EQUIVALENT) 100 MG capsule, Take 1 capsule (100 mg) by mouth 2 times daily., Disp: 60 capsule, Rfl: 11    cycloSPORINE modified (GENERIC EQUIVALENT) 25 MG capsule, Take 1 capsule (25 mg) by mouth 2 times daily as needed (for dose cahnges)., Disp: , Rfl:     furosemide (LASIX) 20 MG tablet, Take 20 mg by mouth every other day., Disp: , Rfl:     levETIRAcetam (KEPPRA) 1000 MG tablet, Take 1 tablet (1,000 mg) by mouth 3 times daily., Disp: 180 tablet, Rfl: 3    midazolam 5 mg/mL (VERSED) 5 mg/mL intranasal solution, Apply 1 mL (5 mg) into one nostril as directed once as needed for seizures (1 squirt in JUST ONE nostril.  To be used for GTC greater than 3 to 4 minutes, focal seizures [smaller seizures] greater than 10 minutes, 3 or more GTC or focal seizures within 24 hours)., Disp: 2 mL, Rfl: 2    multivitamin w/minerals (THERA-VIT-M) tablet, Take 1 tablet by mouth daily., Disp: , Rfl:     psyllium (METAMUCIL) WAFR, Take 2 Wafers by mouth daily, Disp: 60 Wafer, Rfl: 2  No current facility-administered medications for this visit.    Facility-Administered Medications Ordered in Other Visits:     hyoscyamine (LEVSIN/SL) sublingual tablet 125 mcg, 125 mcg, Sublingual, Q4H PRN, Farhan Schilling MD    SOCIAL HISTORY:  Social History     Socioeconomic History    Marital status:      Spouse name: Albino    Number of children: 3    Years of education: 14    Highest education level: Not on file   Occupational History    Occupation: unemployed   Tobacco Use    Smoking status: Never     Passive exposure: Never    Smokeless tobacco: Never   Vaping Use    Vaping status: Never Used   Substance and Sexual Activity    Alcohol use: Not Currently    Drug use: No    Sexual activity: Yes     Partners: Male     Birth control/protection: Post-menopausal     Comment: harper had vasectomy   Other Topics Concern     Service No    Blood Transfusions No    Caffeine  Concern No     Comment: rarely drinks caffeine    Occupational Exposure Not Asked    Hobby Hazards Not Asked    Sleep Concern Not Asked    Stress Concern Not Asked    Weight Concern Not Asked    Special Diet Not Asked    Back Care Not Asked    Exercise Yes     Comment: does a lot of walking - 4x/week    Bike Helmet Not Asked    Seat Belt Yes     Comment: always    Self-Exams Yes     Comment: SBE encouraged monthly    Parent/sibling w/ CABG, MI or angioplasty before 65F 55M? Yes   Social History Narrative    calcium - drinks 5-6 large glasses skim milk/day    flex sig/colonoscopy -at age 49    sun precautions - discussed    mammogram - needs every 2 years in her 30's, then yearly from then on    Td booster - 9/99 and 4/27/2010    pneumovax -at age 59    DEXA -when perimenopausal    stool hemoccults - every year after age 40    ASA- start at age 40    mulvitamin - encouraged     Social Drivers of Health     Financial Resource Strain: Low Risk  (2/4/2024)    Financial Resource Strain     Within the past 12 months, have you or your family members you live with been unable to get utilities (heat, electricity) when it was really needed?: No   Food Insecurity: Low Risk  (2/4/2024)    Food Insecurity     Within the past 12 months, did you worry that your food would run out before you got money to buy more?: No     Within the past 12 months, did the food you bought just not last and you didn t have money to get more?: No   Transportation Needs: Low Risk  (2/4/2024)    Transportation Needs     Within the past 12 months, has lack of transportation kept you from medical appointments, getting your medicines, non-medical meetings or appointments, work, or from getting things that you need?: No   Physical Activity: Unknown (9/15/2020)    Exercise Vital Sign     Days of Exercise per Week: 0 days     Minutes of Exercise per Session: Not on file   Stress: Stress Concern Present (9/15/2020)    Citizen of [place] of Occupational Health  - Occupational Stress Questionnaire     Feeling of Stress : Rather much   Social Connections: Unknown (9/15/2020)    Social Connection and Isolation Panel [NHANES]     Frequency of Communication with Friends and Family: More than three times a week     Frequency of Social Gatherings with Friends and Family: More than three times a week     Attends [employer] Services: Not on file     Active Member of Clubs or Organizations: Not on file     Attends Club or Organization Meetings: Not on file     Marital Status: Not on file   Interpersonal Safety: Low Risk  (1/30/2025)    Interpersonal Safety     Do you feel physically and emotionally safe where you currently live?: Yes     Within the past 12 months, have you been hit, slapped, kicked or otherwise physically hurt by someone?: No     Within the past 12 months, have you been humiliated or emotionally abused in other ways by your partner or ex-partner?: No   Housing Stability: Low Risk  (2/4/2024)    Housing Stability     Do you have housing? : Yes     Are you worried about losing your housing?: No       FAMILY HISTORY:  Family History   Problem Relation Age of Onset    Breast Cancer Mother     Gastrointestinal Disease Mother     Heart Disease Father 57    Hypertension Maternal Grandmother     Substance Abuse Maternal Grandfather     Diabetes Maternal Grandfather     Diabetes Paternal Grandmother     Heart Disease Paternal Grandfather     Cancer Sister     Skin Cancer Sister     Substance Abuse Maternal Uncle     Substance Abuse Maternal Uncle     Suicide Niece     Suicide Niece     Anesthesia Reaction No family hx of     Thrombosis No family hx of        Past/family/social history reviewed and no changes    PHYSICAL EXAMINATION:  Constitutional: aaox3, cooperative, pleasant, not dyspneic/diaphoretic, no acute distress  Vitals reviewed: LMP 05/31/2006   Wt:   Wt Readings from Last 2 Encounters:   03/13/25 86.6 kg (191 lb)   01/30/25 84.8 kg (187 lb)      Constitutional -  general appearance is well and in no acute distress. Body habitus normal  Eyes - No redness or discharge  Respiratory - No cough, unlabored breathing  Musculoskeletal - range of motion intact: Neck and arms  Abdomen: soft nontender nondistended   Neurological - No focal deficits  Psychiatric - No anxiety, alert & oriented

## 2025-04-07 NOTE — PROGRESS NOTES
Virtual Visit Details    Type of service:  Video Visit   Video Start Time:  9:33  Video End Time: 9:50    Originating Location (pt. Location): Home    Distant Location (provider location):  On-site  Platform used for Video Visit: [employer]

## 2025-04-10 ENCOUNTER — LAB (OUTPATIENT)
Dept: LAB | Facility: CLINIC | Age: 60
End: 2025-04-10
Payer: MEDICARE

## 2025-04-10 DIAGNOSIS — K70.31 ALCOHOLIC CIRRHOSIS OF LIVER WITH ASCITES (H): ICD-10-CM

## 2025-04-10 DIAGNOSIS — R94.6 ABNORMAL RESULTS OF THYROID FUNCTION STUDIES: ICD-10-CM

## 2025-04-10 DIAGNOSIS — F10.11 ALCOHOL ABUSE, IN REMISSION: ICD-10-CM

## 2025-04-10 DIAGNOSIS — R79.89 ELEVATED TSH: ICD-10-CM

## 2025-04-10 DIAGNOSIS — Z94.4 LIVER REPLACED BY TRANSPLANT (H): ICD-10-CM

## 2025-04-10 LAB
ALBUMIN SERPL BCG-MCNC: 4.4 G/DL (ref 3.5–5.2)
ALP SERPL-CCNC: 165 U/L (ref 40–150)
ALT SERPL W P-5'-P-CCNC: 58 U/L (ref 0–50)
ANION GAP SERPL CALCULATED.3IONS-SCNC: 9 MMOL/L (ref 7–15)
AST SERPL W P-5'-P-CCNC: 50 U/L (ref 0–45)
BILIRUB DIRECT SERPL-MCNC: 0.38 MG/DL (ref 0–0.3)
BILIRUB SERPL-MCNC: 1 MG/DL
BUN SERPL-MCNC: 22.6 MG/DL (ref 8–23)
CALCIUM SERPL-MCNC: 9.5 MG/DL (ref 8.8–10.4)
CHLORIDE SERPL-SCNC: 105 MMOL/L (ref 98–107)
CREAT SERPL-MCNC: 0.91 MG/DL (ref 0.51–0.95)
CYCLOSPORINE BLD LC/MS/MS-MCNC: 95 UG/L (ref 50–400)
EGFRCR SERPLBLD CKD-EPI 2021: 72 ML/MIN/1.73M2
ERYTHROCYTE [DISTWIDTH] IN BLOOD BY AUTOMATED COUNT: 13.4 % (ref 10–15)
GLUCOSE SERPL-MCNC: 89 MG/DL (ref 70–99)
HCO3 SERPL-SCNC: 26 MMOL/L (ref 22–29)
HCT VFR BLD AUTO: 35.6 % (ref 35–47)
HGB BLD-MCNC: 12.1 G/DL (ref 11.7–15.7)
MAGNESIUM SERPL-MCNC: 2 MG/DL (ref 1.7–2.3)
MCH RBC QN AUTO: 29.7 PG (ref 26.5–33)
MCHC RBC AUTO-ENTMCNC: 34 G/DL (ref 31.5–36.5)
MCV RBC AUTO: 87 FL (ref 78–100)
PHOSPHATE SERPL-MCNC: 4 MG/DL (ref 2.5–4.5)
PLATELET # BLD AUTO: 212 10E3/UL (ref 150–450)
POTASSIUM SERPL-SCNC: 4.5 MMOL/L (ref 3.4–5.3)
PROT SERPL-MCNC: 7.5 G/DL (ref 6.4–8.3)
RBC # BLD AUTO: 4.08 10E6/UL (ref 3.8–5.2)
SODIUM SERPL-SCNC: 140 MMOL/L (ref 135–145)
TME LAST DOSE: NORMAL H
TME LAST DOSE: NORMAL H
TSH SERPL DL<=0.005 MIU/L-ACNC: 2.23 UIU/ML (ref 0.3–4.2)
WBC # BLD AUTO: 2.7 10E3/UL (ref 4–11)

## 2025-04-11 ENCOUNTER — TELEPHONE (OUTPATIENT)
Dept: TRANSPLANT | Facility: CLINIC | Age: 60
End: 2025-04-11
Payer: MEDICARE

## 2025-04-11 DIAGNOSIS — Z94.4 LIVER REPLACED BY TRANSPLANT (H): Primary | ICD-10-CM

## 2025-04-15 ENCOUNTER — TELEPHONE (OUTPATIENT)
Dept: GASTROENTEROLOGY | Facility: CLINIC | Age: 60
End: 2025-04-15
Payer: MEDICARE

## 2025-04-15 NOTE — TELEPHONE ENCOUNTER
"Endoscopy Scheduling Screen    Caller: patient    Have you had any respiratory illness or flu-like symptoms in the last 10 days?  No    What is your communication preference for Instructions and/or Bowel Prep?   MyChart    What insurance is in the chart?  Other:  MEDICARE/[employer]    Ordering/Referring Provider: Farhan Schilling MD in  GASTROENTEROLOGY     (If ordering provider performs procedure, schedule with ordering provider unless otherwise instructed. )    BMI: Estimated body mass index is 31.78 kg/m  as calculated from the following:    Height as of 1/30/25: 1.651 m (5' 5\").    Weight as of 3/13/25: 86.6 kg (191 lb).     Sedation Ordered  MAC/deep sedation.   BMI<= 45 45 < BMI <= 48 48 < BMI < = 50  BMI > 50   No Restrictions No [facility]  No [facility]  [facility] with exceptions Hospital Only OR Only       Do you have a history of malignant hyperthermia?  No    (Females) Are you currently pregnant?   No     Have you been diagnosed or told you have pulmonary hypertension?   No    Do you have an LVAD?  No    Have you been told you have moderate to severe sleep apnea?  No.    Have you been told you have COPD, asthma, or any other lung disease?  No    Has your doctor ordered any cardiac tests like echo, angiogram, stress test, ablation, or EKG, that you have not completed yet?  No    Do you  have a history of any heart conditions?  No     Have you ever had or are you waiting for an organ transplant?  Yes. Have you had or are on on the wait list for a heart/lung transplant? No, may schedule at all facilities except [facility]    Have you had a stroke or transient ischemic attack (TIA aka \"mini stroke\") in the last 2 years?   No.    Have you been diagnosed with or been told you have cirrhosis of the liver?   Yes. (RN Review required for scheduling unless scheduling in Hospital.)    Are you currently on dialysis?   No    Do you need assistance transferring?   No    BMI: Estimated body mass index is 31.78 kg/m  as " "calculated from the following:    Height as of 1/30/25: 1.651 m (5' 5\").    Weight as of 3/13/25: 86.6 kg (191 lb).     Is patients BMI > 40 and scheduling location [facility]?  No    Do you take an injectable or oral medication for weight loss or diabetes (excluding insulin)?  Yes, hold time can be up to 7 days. Please consult with you prescribing provider to discuss endoscopy recommendations. (Please schedule at least 7 days out.)    Do you take the medication Naltrexone?  No    Do you take blood thinners?  No       Prep   Are you currently on dialysis or do you have chronic kidney disease?  No    Do you have a diagnosis of diabetes?  No    Do you have a diagnosis of cystic fibrosis (CF)?  No    On a regular basis do you go 3 -5 days between bowel movements?  No    BMI > 40?  No    Preferred Pharmacy:    [facility] [address]  [address] [address]  Phone: [phone] Fax: [phone] Alternate Fax: [phone], [phone]    Final Scheduling Details     Procedure scheduled  Colonoscopy    Surgeon:  BIBI     Date of procedure:  8/6     Pre-OP / PAC:   No - Not required for this site.    Location  [facility] - Per order.    Sedation   MAC/Deep Sedation - Per order.      Patient Reminders:   You will receive a call from a Nurse to review instructions and health history.  This assessment must be completed prior to your procedure.  Failure to complete the Nurse assessment may result in the procedure being cancelled.      On the day of your procedure, please designate an adult(s) who can drive you home stay with you for the next 24 hours. The medicines used in the exam will make you sleepy. You will not be able to drive.      You cannot take public transportation, ride share services, or non-medical taxi service without a responsible caregiver.  Medical transport services are allowed with the requirement that a responsible caregiver will " receive you at your destination.  We require that drivers and caregivers are confirmed prior to your procedure.

## 2025-04-23 ENCOUNTER — LAB (OUTPATIENT)
Dept: LAB | Facility: CLINIC | Age: 60
End: 2025-04-23
Payer: MEDICARE

## 2025-04-23 DIAGNOSIS — Z94.4 LIVER REPLACED BY TRANSPLANT (H): ICD-10-CM

## 2025-04-23 DIAGNOSIS — F10.11 ALCOHOL ABUSE, IN REMISSION: ICD-10-CM

## 2025-04-23 DIAGNOSIS — K70.31 ALCOHOLIC CIRRHOSIS OF LIVER WITH ASCITES (H): ICD-10-CM

## 2025-04-23 DIAGNOSIS — G40.109 LOCALIZATION-RELATED FOCAL EPILEPSY WITH SIMPLE PARTIAL SEIZURES (H): ICD-10-CM

## 2025-04-23 LAB
ALBUMIN SERPL BCG-MCNC: 4.2 G/DL (ref 3.5–5.2)
ALP SERPL-CCNC: 152 U/L (ref 40–150)
ALT SERPL W P-5'-P-CCNC: 52 U/L (ref 0–50)
AST SERPL W P-5'-P-CCNC: 45 U/L (ref 0–45)
BILIRUB DIRECT SERPL-MCNC: 0.29 MG/DL (ref 0–0.3)
BILIRUB SERPL-MCNC: 0.8 MG/DL
LEVETIRACETAM SERPL-MCNC: 30.4 ÂΜG/ML (ref 10–40)
PROT SERPL-MCNC: 7.3 G/DL (ref 6.4–8.3)

## 2025-04-23 PROCEDURE — 80177 DRUG SCRN QUAN LEVETIRACETAM: CPT

## 2025-04-23 PROCEDURE — 36415 COLL VENOUS BLD VENIPUNCTURE: CPT

## 2025-04-23 PROCEDURE — 80076 HEPATIC FUNCTION PANEL: CPT

## 2025-04-24 ENCOUNTER — TELEPHONE (OUTPATIENT)
Dept: TRANSPLANT | Facility: CLINIC | Age: 60
End: 2025-04-24
Payer: MEDICARE

## 2025-04-24 DIAGNOSIS — Z94.4 LIVER TRANSPLANTED (H): Primary | ICD-10-CM

## 2025-04-24 SDOH — HEALTH STABILITY: PHYSICAL HEALTH: ON AVERAGE, HOW MANY DAYS PER WEEK DO YOU ENGAGE IN MODERATE TO STRENUOUS EXERCISE (LIKE A BRISK WALK)?: 3 DAYS

## 2025-04-24 SDOH — HEALTH STABILITY: PHYSICAL HEALTH: ON AVERAGE, HOW MANY MINUTES DO YOU ENGAGE IN EXERCISE AT THIS LEVEL?: 30 MIN

## 2025-04-24 ASSESSMENT — SOCIAL DETERMINANTS OF HEALTH (SDOH): HOW OFTEN DO YOU GET TOGETHER WITH FRIENDS OR RELATIVES?: TWICE A WEEK

## 2025-04-24 NOTE — TELEPHONE ENCOUNTER
Issue:  Elevated ALT 52.    This is a repeat, previous ALT 58.  Last CSA slightly below goal at 95. WBC also low with last set of labs.    Plan:  Call placed to Abiola Matute to check in. Left detailed VM, asked for call back or message to SOT.   Would recommend repeat labs, including CSA next week.     Pt also sent MyChart message, she has been drinking mushroom tea. RNCC advised her to stop taking this, awaiting reply.

## 2025-04-29 ENCOUNTER — OFFICE VISIT (OUTPATIENT)
Dept: FAMILY MEDICINE | Facility: CLINIC | Age: 60
End: 2025-04-29
Payer: MEDICARE

## 2025-04-29 VITALS
TEMPERATURE: 97.2 F | RESPIRATION RATE: 18 BRPM | OXYGEN SATURATION: 100 % | DIASTOLIC BLOOD PRESSURE: 80 MMHG | BODY MASS INDEX: 29.99 KG/M2 | HEIGHT: 65 IN | SYSTOLIC BLOOD PRESSURE: 114 MMHG | HEART RATE: 88 BPM | WEIGHT: 180 LBS

## 2025-04-29 DIAGNOSIS — I10 ESSENTIAL HYPERTENSION WITH GOAL BLOOD PRESSURE LESS THAN 140/90: ICD-10-CM

## 2025-04-29 DIAGNOSIS — Z94.4 LIVER TRANSPLANTED (H): ICD-10-CM

## 2025-04-29 DIAGNOSIS — E66.811 CLASS 1 OBESITY WITH SERIOUS COMORBIDITY AND BODY MASS INDEX (BMI) OF 31.0 TO 31.9 IN ADULT, UNSPECIFIED OBESITY TYPE: Primary | ICD-10-CM

## 2025-04-29 DIAGNOSIS — R14.0 ABDOMINAL DISTENTION: ICD-10-CM

## 2025-04-29 LAB
ALBUMIN SERPL BCG-MCNC: 4.4 G/DL (ref 3.5–5.2)
ALP SERPL-CCNC: 155 U/L (ref 40–150)
ALT SERPL W P-5'-P-CCNC: 54 U/L (ref 0–50)
ANION GAP SERPL CALCULATED.3IONS-SCNC: 11 MMOL/L (ref 7–15)
AST SERPL W P-5'-P-CCNC: 50 U/L (ref 0–45)
BASOPHILS # BLD AUTO: 0 10E3/UL (ref 0–0.2)
BASOPHILS NFR BLD AUTO: 1 %
BILIRUB DIRECT SERPL-MCNC: 0.48 MG/DL (ref 0–0.3)
BILIRUB SERPL-MCNC: 1.2 MG/DL
BUN SERPL-MCNC: 27.9 MG/DL (ref 8–23)
CALCIUM SERPL-MCNC: 9.6 MG/DL (ref 8.8–10.4)
CHLORIDE SERPL-SCNC: 103 MMOL/L (ref 98–107)
CREAT SERPL-MCNC: 1.11 MG/DL (ref 0.51–0.95)
CREAT UR-MCNC: 199 MG/DL
CYCLOSPORINE BLD LC/MS/MS-MCNC: 83 UG/L (ref 50–400)
EGFRCR SERPLBLD CKD-EPI 2021: 57 ML/MIN/1.73M2
EOSINOPHIL # BLD AUTO: 0.2 10E3/UL (ref 0–0.7)
EOSINOPHIL NFR BLD AUTO: 4 %
ERYTHROCYTE [DISTWIDTH] IN BLOOD BY AUTOMATED COUNT: 13.1 % (ref 10–15)
GLUCOSE SERPL-MCNC: 82 MG/DL (ref 70–99)
HCO3 SERPL-SCNC: 25 MMOL/L (ref 22–29)
HCT VFR BLD AUTO: 37.1 % (ref 35–47)
HGB BLD-MCNC: 12.7 G/DL (ref 11.7–15.7)
IMM GRANULOCYTES # BLD: 0 10E3/UL
IMM GRANULOCYTES NFR BLD: 0 %
LYMPHOCYTES # BLD AUTO: 0.9 10E3/UL (ref 0.8–5.3)
LYMPHOCYTES NFR BLD AUTO: 23 %
MCH RBC QN AUTO: 29.6 PG (ref 26.5–33)
MCHC RBC AUTO-ENTMCNC: 34.2 G/DL (ref 31.5–36.5)
MCV RBC AUTO: 87 FL (ref 78–100)
MICROALBUMIN UR-MCNC: 25.6 MG/L
MICROALBUMIN/CREAT UR: 12.86 MG/G CR (ref 0–25)
MONOCYTES # BLD AUTO: 0.3 10E3/UL (ref 0–1.3)
MONOCYTES NFR BLD AUTO: 9 %
NEUTROPHILS # BLD AUTO: 2.3 10E3/UL (ref 1.6–8.3)
NEUTROPHILS NFR BLD AUTO: 63 %
PLATELET # BLD AUTO: 246 10E3/UL (ref 150–450)
POTASSIUM SERPL-SCNC: 4 MMOL/L (ref 3.4–5.3)
PROT SERPL-MCNC: 7.7 G/DL (ref 6.4–8.3)
RBC # BLD AUTO: 4.29 10E6/UL (ref 3.8–5.2)
SODIUM SERPL-SCNC: 139 MMOL/L (ref 135–145)
TME LAST DOSE: NORMAL H
TME LAST DOSE: NORMAL H
WBC # BLD AUTO: 3.7 10E3/UL (ref 4–11)

## 2025-04-29 PROCEDURE — 82570 ASSAY OF URINE CREATININE: CPT | Performed by: NURSE PRACTITIONER

## 2025-04-29 PROCEDURE — 3074F SYST BP LT 130 MM HG: CPT | Performed by: NURSE PRACTITIONER

## 2025-04-29 PROCEDURE — 82248 BILIRUBIN DIRECT: CPT | Performed by: NURSE PRACTITIONER

## 2025-04-29 PROCEDURE — 82043 UR ALBUMIN QUANTITATIVE: CPT | Performed by: NURSE PRACTITIONER

## 2025-04-29 PROCEDURE — 80053 COMPREHEN METABOLIC PANEL: CPT | Performed by: NURSE PRACTITIONER

## 2025-04-29 PROCEDURE — 85025 COMPLETE CBC W/AUTO DIFF WBC: CPT | Performed by: NURSE PRACTITIONER

## 2025-04-29 PROCEDURE — 3079F DIAST BP 80-89 MM HG: CPT | Performed by: NURSE PRACTITIONER

## 2025-04-29 PROCEDURE — G2211 COMPLEX E/M VISIT ADD ON: HCPCS | Performed by: NURSE PRACTITIONER

## 2025-04-29 PROCEDURE — 99214 OFFICE O/P EST MOD 30 MIN: CPT | Performed by: NURSE PRACTITIONER

## 2025-04-29 PROCEDURE — 36415 COLL VENOUS BLD VENIPUNCTURE: CPT | Performed by: NURSE PRACTITIONER

## 2025-04-29 PROCEDURE — 80158 DRUG ASSAY CYCLOSPORINE: CPT | Performed by: NURSE PRACTITIONER

## 2025-04-29 RX ORDER — FUROSEMIDE 20 MG/1
20 TABLET ORAL EVERY OTHER DAY
Qty: 45 TABLET | Refills: 3 | Status: SHIPPED | OUTPATIENT
Start: 2025-04-29

## 2025-04-29 NOTE — PROGRESS NOTES
"  Assessment & Plan     Virginia was seen today for recheck medication.    Diagnoses and all orders for this visit:    Class 1 obesity with serious comorbidity and body mass index (BMI) of 31.0 to 31.9 in adult, unspecified obesity type  Previously stable on subcutaneous Semaglutide 0.5 mg once weekly, now with plateau in weight and compounding of subcutaneous Semaglutide no longer available. Will plan increase in dose and change to compounded sublingual Semaglutide.    -     COMPOUNDED NON-CONTROLLED SUBSTANCE (CMPD RX) - PHARMACY TO MIX COMPOUNDED MEDICATION; Compounded sublingual Semaglutide 2mg/ml Take 0.75 ml (1.5 mg) once daily.    Essential hypertension with goal blood pressure less than 140/90  Currently stable.  Surveillance urine microalbumin.    -     Albumin Random Urine Quantitative with Creat Ratio    Abdominal distention  Sensation of fluid overload in upper abdomen after liver transplant.  Currently taking Furosemide 20 mg every other day.    -     furosemide (LASIX) 20 MG tablet; Take 1 tablet (20 mg) by mouth every other day.      The longitudinal plan of care for the diagnosis(es)/condition(s) as documented were addressed during this visit. Due to the added complexity in care, I will continue to support Virginia in the subsequent management and with ongoing continuity of care.    BMI  Estimated body mass index is 29.95 kg/m  as calculated from the following:    Height as of this encounter: 1.651 m (5' 5\").    Weight as of this encounter: 81.6 kg (180 lb).       Counseling  Appropriate preventive services were addressed with this patient via screening, questionnaire, or discussion as appropriate for fall prevention, nutrition, physical activity, Tobacco-use cessation, social engagement, weight loss and cognition.  Checklist reviewing preventive services available has been given to the patient.  Reviewed patient's diet, addressing concerns and/or questions.   She is at risk for lack of exercise and has been " "provided with information to increase physical activity for the benefit of her well-being.   Discussed possible causes of fatigue.     Return in about 3 months (around 7/29/2025) for Med check, In Clinic.        Art Coleman is a 59 year old, presenting for the following health issues:  Recheck Medication        4/29/2025     9:06 AM   Additional Questions   Roomed by Charlene GRANDA MA   Accompanied by Self     History of Present Illness       Reason for visit:  Follow up for medsShe exercises with enough effort to increase her heart rate 30 to 60 minutes per day.  She exercises with enough effort to increase her heart rate 3 or less days per week.   She is taking medications regularly.      Vitals are w/in NL. Patient is here for weight management medication. Patient needs to get labs done for liver provider they were supposed to get it done last week her liver enzymes have been elevated would like to see if she can get them done today as she can not take her meds prior and is having a tough time getting on the schedule. Patient states there should be standing orders through her other providers.     Is currently on Semaglutide 0.5 mg once weekly.  Has 2 more doses remaining.    Injection day:  Saturday.   Noticeable weight plateau over the past 2 weeks.    Starting weight:  192 lbs.    Eating habits have changed, little to no cravings.   Has been swimming 2-3 times weekly.      Wt Readings from Last 4 Encounters:   04/29/25 81.6 kg (180 lb)   03/13/25 86.6 kg (191 lb)   01/30/25 84.8 kg (187 lb)   01/28/25 86.5 kg (190 lb 12.8 oz)             Review of Systems  Constitutional, HEENT, cardiovascular, pulmonary, gi and gu systems are negative, except as otherwise noted.      Objective    /80   Pulse 88   Temp 97.2  F (36.2  C) (Tympanic)   Resp 18   Ht 1.651 m (5' 5\")   Wt 81.6 kg (180 lb)   LMP 05/31/2006   SpO2 100%   BMI 29.95 kg/m    Body mass index is 29.95 kg/m .    Physical Exam     GENERAL: alert " and no distress  RESP: lungs clear to auscultation - no rales, rhonchi or wheezes  CV: regular rate and rhythm, normal S1 S2, no S3 or S4, no murmur, click or rub, no peripheral edema  PSYCH: mentation appears normal, affect normal/bright        Signed Electronically by: DARYN Lucas CNP

## 2025-04-30 DIAGNOSIS — Z94.4 S/P LIVER TRANSPLANT (H): ICD-10-CM

## 2025-04-30 RX ORDER — CYCLOSPORINE 25 MG/1
25 CAPSULE, LIQUID FILLED ORAL EVERY 12 HOURS
Qty: 180 CAPSULE | Refills: 3 | Status: SHIPPED | OUTPATIENT
Start: 2025-04-30

## 2025-04-30 RX ORDER — CYCLOSPORINE 100 MG/1
100 CAPSULE, LIQUID FILLED ORAL EVERY 12 HOURS
Qty: 180 CAPSULE | Refills: 3 | Status: SHIPPED | OUTPATIENT
Start: 2025-04-30

## 2025-04-30 NOTE — TELEPHONE ENCOUNTER
ISSUE:   Cyclosporine level 83 on 4/29, goal 100, dose 100 mg BID.pt has increased LFTs. Message to dr coulter to review.    PLAN:   Call Patient and confirm this was an accurate 12-hour trough.   Verify Cyclosporine dose 100 mg BID.   Confirm no new medications or or missed doses.   Confirm no new illness / infection / diarrhea.   If accurate trough and accurate dose, increase Cyclosporine dose to 125 mg BID     Is this more than a 50% increase or decrease in current IS dose: Yes  If YES, justification:     Repeat labs in 1 weeks.  *If > 50% change in immunosuppression dose, repeat labs in 1 week.   Zayra Paulino RN      OUTCOME:   Spoke with Patient, they confirm accurate trough level and current dose 100 mg BID.   Patient confirmed dose change to 125 mg BID.  Patient agreed to repeat labs in 1 weeks.   Orders sent to preferred pharmacy for dose change and lab for repeat labs.   Patient voiced understanding of plan.     Michelle Starks LPN    Pt has been on semaglutide for 8 weeks now.

## 2025-04-30 NOTE — RESULT ENCOUNTER NOTE
Dear Virginia,     -Microalbumin (urine protein) test is normal.  ADVISE: rechecking this annually.      Thank you,     Linda Macdonadl, APRN, CNP  [facility]

## 2025-05-01 ENCOUNTER — TELEPHONE (OUTPATIENT)
Dept: TRANSPLANT | Facility: CLINIC | Age: 60
End: 2025-05-01
Payer: MEDICARE

## 2025-05-01 DIAGNOSIS — Z94.4 S/P LIVER TRANSPLANT (H): Primary | ICD-10-CM

## 2025-05-01 NOTE — TELEPHONE ENCOUNTER
Virginia had more questions regards to liver biopsy and wanted to review before would schedule.    Contacted [facility], but Rex had questions about why doing biopsy.

## 2025-05-01 NOTE — TELEPHONE ENCOUNTER
"Spoke with Virginia. Pt has mildly elevated LFTs. Reviewed with dr Agudelo. Patient to have a liver biopsy.    Spoke with Virginia. She is agreeing with plan and she is anxious as she went on google and was looking up information and she stated \"I made myself worse and this will ease my mind.\"     Virginia aware IR will reach out to her.   "

## 2025-05-05 ENCOUNTER — LAB (OUTPATIENT)
Dept: LAB | Facility: CLINIC | Age: 60
End: 2025-05-05
Payer: MEDICARE

## 2025-05-05 ENCOUNTER — MYC MEDICAL ADVICE (OUTPATIENT)
Dept: INTERVENTIONAL RADIOLOGY/VASCULAR | Facility: CLINIC | Age: 60
End: 2025-05-05

## 2025-05-05 DIAGNOSIS — Z94.4 S/P LIVER TRANSPLANT (H): ICD-10-CM

## 2025-05-05 LAB
ALBUMIN SERPL BCG-MCNC: 4.6 G/DL (ref 3.5–5.2)
ALP SERPL-CCNC: 156 U/L (ref 40–150)
ALT SERPL W P-5'-P-CCNC: 51 U/L (ref 0–50)
AST SERPL W P-5'-P-CCNC: 47 U/L (ref 0–45)
BILIRUB DIRECT SERPL-MCNC: 0.6 MG/DL (ref 0–0.3)
BILIRUB SERPL-MCNC: 1.5 MG/DL
PROT SERPL-MCNC: 7.8 G/DL (ref 6.4–8.3)

## 2025-05-05 PROCEDURE — 80076 HEPATIC FUNCTION PANEL: CPT

## 2025-05-05 PROCEDURE — 36415 COLL VENOUS BLD VENIPUNCTURE: CPT

## 2025-05-08 ENCOUNTER — RESULTS FOLLOW-UP (OUTPATIENT)
Dept: TRANSPLANT | Facility: CLINIC | Age: 60
End: 2025-05-08

## 2025-05-08 ENCOUNTER — INFUSION THERAPY VISIT (OUTPATIENT)
Dept: INFUSION THERAPY | Facility: CLINIC | Age: 60
End: 2025-05-08
Attending: INTERNAL MEDICINE
Payer: MEDICARE

## 2025-05-08 VITALS
HEART RATE: 74 BPM | TEMPERATURE: 98.1 F | OXYGEN SATURATION: 98 % | BODY MASS INDEX: 29.95 KG/M2 | DIASTOLIC BLOOD PRESSURE: 80 MMHG | WEIGHT: 180 LBS | RESPIRATION RATE: 18 BRPM | SYSTOLIC BLOOD PRESSURE: 113 MMHG

## 2025-05-08 DIAGNOSIS — K50.119 CROHN'S DISEASE OF PERIANAL REGION WITH COMPLICATION (H): Primary | ICD-10-CM

## 2025-05-08 LAB
ALBUMIN SERPL BCG-MCNC: 4.8 G/DL (ref 3.5–5.2)
ALP SERPL-CCNC: 176 U/L (ref 40–150)
ALT SERPL W P-5'-P-CCNC: 57 U/L (ref 0–50)
AST SERPL W P-5'-P-CCNC: 50 U/L (ref 0–45)
BASOPHILS # BLD AUTO: 0 10E3/UL (ref 0–0.2)
BASOPHILS NFR BLD AUTO: 1 %
BILIRUB DIRECT SERPL-MCNC: 0.38 MG/DL (ref 0–0.3)
BILIRUB SERPL-MCNC: 1.7 MG/DL
CRP SERPL-MCNC: <3 MG/L
EOSINOPHIL # BLD AUTO: 0.1 10E3/UL (ref 0–0.7)
EOSINOPHIL NFR BLD AUTO: 3 %
ERYTHROCYTE [DISTWIDTH] IN BLOOD BY AUTOMATED COUNT: 13.1 % (ref 10–15)
HCT VFR BLD AUTO: 40.8 % (ref 35–47)
HGB BLD-MCNC: 14.3 G/DL (ref 11.7–15.7)
IMM GRANULOCYTES # BLD: 0 10E3/UL
IMM GRANULOCYTES NFR BLD: 0 %
LYMPHOCYTES # BLD AUTO: 1 10E3/UL (ref 0.8–5.3)
LYMPHOCYTES NFR BLD AUTO: 26 %
MCH RBC QN AUTO: 29.7 PG (ref 26.5–33)
MCHC RBC AUTO-ENTMCNC: 35 G/DL (ref 31.5–36.5)
MCV RBC AUTO: 85 FL (ref 78–100)
MONOCYTES # BLD AUTO: 0.3 10E3/UL (ref 0–1.3)
MONOCYTES NFR BLD AUTO: 8 %
NEUTROPHILS # BLD AUTO: 2.4 10E3/UL (ref 1.6–8.3)
NEUTROPHILS NFR BLD AUTO: 63 %
NRBC # BLD AUTO: 0 10E3/UL
NRBC BLD AUTO-RTO: 0 /100
PLATELET # BLD AUTO: 240 10E3/UL (ref 150–450)
PROT SERPL-MCNC: 8.5 G/DL (ref 6.4–8.3)
RBC # BLD AUTO: 4.82 10E6/UL (ref 3.8–5.2)
WBC # BLD AUTO: 3.8 10E3/UL (ref 4–11)

## 2025-05-08 PROCEDURE — 84450 TRANSFERASE (AST) (SGOT): CPT | Performed by: INTERNAL MEDICINE

## 2025-05-08 PROCEDURE — 85025 COMPLETE CBC W/AUTO DIFF WBC: CPT | Performed by: INTERNAL MEDICINE

## 2025-05-08 PROCEDURE — 86140 C-REACTIVE PROTEIN: CPT | Performed by: INTERNAL MEDICINE

## 2025-05-08 PROCEDURE — 36415 COLL VENOUS BLD VENIPUNCTURE: CPT | Performed by: INTERNAL MEDICINE

## 2025-05-08 PROCEDURE — 258N000003 HC RX IP 258 OP 636: Performed by: INTERNAL MEDICINE

## 2025-05-08 RX ORDER — ACETAMINOPHEN 325 MG/1
650 TABLET ORAL ONCE
Start: 2025-07-03 | End: 2025-07-03

## 2025-05-08 RX ORDER — ALBUTEROL SULFATE 90 UG/1
1-2 INHALANT RESPIRATORY (INHALATION)
Start: 2025-07-03

## 2025-05-08 RX ORDER — ALBUTEROL SULFATE 0.83 MG/ML
2.5 SOLUTION RESPIRATORY (INHALATION)
OUTPATIENT
Start: 2025-07-03

## 2025-05-08 RX ORDER — METHYLPREDNISOLONE SODIUM SUCCINATE 40 MG/ML
40 INJECTION INTRAMUSCULAR; INTRAVENOUS
Start: 2025-07-03

## 2025-05-08 RX ORDER — DIPHENHYDRAMINE HYDROCHLORIDE 50 MG/ML
25 INJECTION, SOLUTION INTRAMUSCULAR; INTRAVENOUS
Start: 2025-07-03

## 2025-05-08 RX ORDER — DIPHENHYDRAMINE HYDROCHLORIDE 50 MG/ML
50 INJECTION, SOLUTION INTRAMUSCULAR; INTRAVENOUS
Start: 2025-07-03

## 2025-05-08 RX ORDER — EPINEPHRINE 1 MG/ML
0.3 INJECTION, SOLUTION INTRAMUSCULAR; SUBCUTANEOUS EVERY 5 MIN PRN
OUTPATIENT
Start: 2025-07-03

## 2025-05-08 RX ORDER — MEPERIDINE HYDROCHLORIDE 25 MG/ML
25 INJECTION INTRAMUSCULAR; INTRAVENOUS; SUBCUTANEOUS
OUTPATIENT
Start: 2025-07-03

## 2025-05-08 RX ORDER — DIPHENHYDRAMINE HCL 25 MG
25 CAPSULE ORAL ONCE
Start: 2025-07-03 | End: 2025-07-03

## 2025-05-08 RX ADMIN — SODIUM CHLORIDE 250 ML: 0.9 INJECTION, SOLUTION INTRAVENOUS at 11:33

## 2025-05-08 RX ADMIN — SODIUM CHLORIDE 900 MG: 9 INJECTION, SOLUTION INTRAVENOUS at 11:32

## 2025-05-08 ASSESSMENT — PAIN SCALES - GENERAL: PAINLEVEL_OUTOF10: NO PAIN (0)

## 2025-05-08 NOTE — PROGRESS NOTES
Infusion Nursing Note:  Abiolajeannette Matute presents today for labs/Inflectra.    Patient seen by provider today: No   present during visit today: Not Applicable.    Note: N/A.      Intravenous Access:  Labs drawn without difficulty.  Peripheral IV placed.    Treatment Conditions:  Biological Infusion Checklist:  ~~~ NOTE: If the patient answers yes to any of the questions below, hold the infusion and contact ordering provider or on-call provider.    Have you recently had an elevated temperature, fever, chills, productive cough, coughing for 3 weeks or longer or hemoptysis,  abnormal vital signs, night sweats,  chest pain or have you noticed a decrease in your appetite, unexplained weight loss or fatigue? No  Do you have any open wounds or new incisions? No  Do you have any upcoming hospitalizations or surgeries? Does not include esophagogastroduodenoscopy, colonoscopy, endoscopic retrograde cholangiopancreatography (ERCP), endoscopic ultrasound (EUS), dental procedures or joint aspiration/steroid injections Yes, Liver Biopsy planned for Monday 5/12- OK to proceed per Dr. Schilling  Do you currently have any signs of illness or infection or are you on any antibiotics? No  Have you had any new, sudden or worsening abdominal pain? No  Have you or anyone in your household received a live vaccination in the past 4 weeks? Please note: No live vaccines while on biologic/chemotherapy until 6 months after the last treatment. Patient can receive the flu vaccine (shot only), pneumovax and the Covid vaccine. It is optimal for the patient to get these vaccines mid cycle, but they can be given at any time as long as it is not on the day of the infusion. No  Have you recently been diagnosed with any new nervous system diseases (ie. Multiple sclerosis, Guillain [place], seizures, neurological changes) or cancer diagnosis? Are you on any form of radiation or chemotherapy? No  Are you pregnant or breast feeding or do you have  plans of pregnancy in the future? No  Have you been having any signs of worsening depression or suicidal ideations?  (benlysta only) No  Have there been any other new onset medical symptoms? No  Have you had any new blood clots? (IVIG only) No      Post Infusion Assessment:  Patient tolerated infusion without incident.  Blood return noted pre and post infusion.  Site patent and intact, free from redness, edema or discomfort.  No evidence of extravasations.  Access discontinued per protocol.       Discharge Plan:   Discharge instructions reviewed with: Patient.  Patient and/or family verbalized understanding of discharge instructions and all questions answered.  Patient discharged in stable condition accompanied by: self.  Departure Mode: Ambulatory.      Sandra Gupta RN

## 2025-05-12 ENCOUNTER — HOSPITAL ENCOUNTER (OUTPATIENT)
Facility: CLINIC | Age: 60
Discharge: HOME | End: 2025-05-12
Attending: INTERNAL MEDICINE | Admitting: INTERNAL MEDICINE
Payer: MEDICARE

## 2025-05-12 ENCOUNTER — APPOINTMENT (OUTPATIENT)
Dept: MEDSURG UNIT | Facility: CLINIC | Age: 60
End: 2025-05-12
Attending: INTERNAL MEDICINE
Payer: MEDICARE

## 2025-05-12 ENCOUNTER — APPOINTMENT (OUTPATIENT)
Dept: INTERVENTIONAL RADIOLOGY/VASCULAR | Facility: CLINIC | Age: 60
End: 2025-05-12
Attending: INTERNAL MEDICINE
Payer: MEDICARE

## 2025-05-12 VITALS
TEMPERATURE: 97.8 F | HEART RATE: 99 BPM | WEIGHT: 179.01 LBS | BODY MASS INDEX: 29.79 KG/M2 | DIASTOLIC BLOOD PRESSURE: 73 MMHG | RESPIRATION RATE: 19 BRPM | SYSTOLIC BLOOD PRESSURE: 119 MMHG | OXYGEN SATURATION: 100 %

## 2025-05-12 DIAGNOSIS — Z94.4 S/P LIVER TRANSPLANT (H): ICD-10-CM

## 2025-05-12 LAB
ERYTHROCYTE [DISTWIDTH] IN BLOOD BY AUTOMATED COUNT: 12.9 % (ref 10–15)
HCT VFR BLD AUTO: 35 % (ref 35–47)
HGB BLD-MCNC: 12 G/DL (ref 11.7–15.7)
INR PPP: 0.99 (ref 0.85–1.15)
MCH RBC QN AUTO: 29.3 PG (ref 26.5–33)
MCHC RBC AUTO-ENTMCNC: 34.3 G/DL (ref 31.5–36.5)
MCV RBC AUTO: 85 FL (ref 78–100)
PLATELET # BLD AUTO: 191 10E3/UL (ref 150–450)
PROTHROMBIN TIME: 13.4 SECONDS (ref 11.8–14.8)
RBC # BLD AUTO: 4.1 10E6/UL (ref 3.8–5.2)
WBC # BLD AUTO: 3.4 10E3/UL (ref 4–11)

## 2025-05-12 PROCEDURE — 76942 ECHO GUIDE FOR BIOPSY: CPT | Mod: 26 | Performed by: PHYSICIAN ASSISTANT

## 2025-05-12 PROCEDURE — 250N000011 HC RX IP 250 OP 636: Performed by: PHYSICIAN ASSISTANT

## 2025-05-12 PROCEDURE — 99152 MOD SED SAME PHYS/QHP 5/>YRS: CPT

## 2025-05-12 PROCEDURE — 88313 SPECIAL STAINS GROUP 2: CPT | Mod: TC | Performed by: INTERNAL MEDICINE

## 2025-05-12 PROCEDURE — 85027 COMPLETE CBC AUTOMATED: CPT | Performed by: RADIOLOGY

## 2025-05-12 PROCEDURE — 88313 SPECIAL STAINS GROUP 2: CPT | Mod: 26 | Performed by: PATHOLOGY

## 2025-05-12 PROCEDURE — 999N000142 HC STATISTIC PROCEDURE PREP ONLY

## 2025-05-12 PROCEDURE — 36415 COLL VENOUS BLD VENIPUNCTURE: CPT | Performed by: RADIOLOGY

## 2025-05-12 PROCEDURE — 99152 MOD SED SAME PHYS/QHP 5/>YRS: CPT | Performed by: PHYSICIAN ASSISTANT

## 2025-05-12 PROCEDURE — 999N000133 HC STATISTIC PP CARE STAGE 2

## 2025-05-12 PROCEDURE — 85610 PROTHROMBIN TIME: CPT | Performed by: RADIOLOGY

## 2025-05-12 PROCEDURE — 88307 TISSUE EXAM BY PATHOLOGIST: CPT | Mod: 26 | Performed by: PATHOLOGY

## 2025-05-12 PROCEDURE — 47000 NEEDLE BIOPSY OF LIVER PERQ: CPT | Performed by: PHYSICIAN ASSISTANT

## 2025-05-12 PROCEDURE — 250N000009 HC RX 250: Performed by: PHYSICIAN ASSISTANT

## 2025-05-12 RX ORDER — FLUMAZENIL 0.1 MG/ML
0.2 INJECTION, SOLUTION INTRAVENOUS
Status: DISCONTINUED | OUTPATIENT
Start: 2025-05-12 | End: 2025-05-12 | Stop reason: HOSPADM

## 2025-05-12 RX ORDER — NALOXONE HYDROCHLORIDE 0.4 MG/ML
0.2 INJECTION, SOLUTION INTRAMUSCULAR; INTRAVENOUS; SUBCUTANEOUS
Status: DISCONTINUED | OUTPATIENT
Start: 2025-05-12 | End: 2025-05-12 | Stop reason: HOSPADM

## 2025-05-12 RX ORDER — NALOXONE HYDROCHLORIDE 0.4 MG/ML
0.4 INJECTION, SOLUTION INTRAMUSCULAR; INTRAVENOUS; SUBCUTANEOUS
Status: DISCONTINUED | OUTPATIENT
Start: 2025-05-12 | End: 2025-05-12 | Stop reason: HOSPADM

## 2025-05-12 RX ORDER — LIDOCAINE 40 MG/G
CREAM TOPICAL
Status: DISCONTINUED | OUTPATIENT
Start: 2025-05-12 | End: 2025-05-12 | Stop reason: HOSPADM

## 2025-05-12 RX ORDER — FENTANYL CITRATE 50 UG/ML
25-50 INJECTION, SOLUTION INTRAMUSCULAR; INTRAVENOUS EVERY 5 MIN PRN
Refills: 0 | Status: DISCONTINUED | OUTPATIENT
Start: 2025-05-12 | End: 2025-05-12 | Stop reason: HOSPADM

## 2025-05-12 RX ADMIN — FENTANYL CITRATE 50 MCG: 50 INJECTION, SOLUTION INTRAMUSCULAR; INTRAVENOUS at 12:47

## 2025-05-12 RX ADMIN — FENTANYL CITRATE 50 MCG: 50 INJECTION, SOLUTION INTRAMUSCULAR; INTRAVENOUS at 12:40

## 2025-05-12 RX ADMIN — LIDOCAINE HYDROCHLORIDE 30 ML: 10 INJECTION, SOLUTION EPIDURAL; INFILTRATION; INTRACAUDAL; PERINEURAL at 12:36

## 2025-05-12 RX ADMIN — MIDAZOLAM 1 MG: 1 INJECTION INTRAMUSCULAR; INTRAVENOUS at 12:47

## 2025-05-12 RX ADMIN — MIDAZOLAM 1 MG: 1 INJECTION INTRAMUSCULAR; INTRAVENOUS at 12:40

## 2025-05-12 RX ADMIN — MIDAZOLAM 1 MG: 1 INJECTION INTRAMUSCULAR; INTRAVENOUS at 12:37

## 2025-05-12 RX ADMIN — FENTANYL CITRATE 50 MCG: 50 INJECTION, SOLUTION INTRAMUSCULAR; INTRAVENOUS at 12:37

## 2025-05-12 ASSESSMENT — ACTIVITIES OF DAILY LIVING (ADL)
ADLS_ACUITY_SCORE: 57

## 2025-05-12 NOTE — PRE-PROCEDURE
GENERAL PRE-PROCEDURE:   Procedure:  IR liver biopsy, percutaneous, image guided  Date/Time:  5/12/2025 12:08 PM    Verbal consent obtained?: Yes    Written consent obtained?: Yes    Risks and benefits: Risks, benefits and alternatives were discussed    Consent given by:  Patient  Patient states understanding of procedure being performed: Yes    Patient's understanding of procedure matches consent: Yes    Procedure consent matches procedure scheduled: Yes    Expected level of sedation:  Moderate  Appropriately NPO:  Yes  ASA Class:  3  Mallampati  :  Grade 2- soft palate, base of uvula, tonsillar pillars, and portion of posterior pharyngeal wall visible  Lungs:  Lungs clear with good breath sounds bilaterally  Heart:  Normal heart sounds and rate  History & Physical reviewed:  Abbreviated history and physical done prior to moderate sedation  Statement of review:  I have reviewed the lab findings, diagnostic data, medications, and the plan for sedation

## 2025-05-12 NOTE — PROCEDURES
[facility]    Procedure: IR Procedure Note    Date/Time: 5/12/2025 1:06 PM    Performed by: Pilar Adorno PA-C  Authorized by: Pilar Adorno PA-C  IR Fellow Physician:    Pre Procedure Diagnosis: S/p liver transplant  Post Procedure Diagnosis: same    UNIVERSAL PROTOCOL   Site Marked: NA  Prior Images Obtained and Reviewed:  Yes  Required items: Required blood products, implants, devices and special equipment available    Patient identity confirmed:  Arm band, provided demographic data, hospital-assigned identification number and verbally with patient  Patient was reevaluated immediately before administering moderate or deep sedation or anesthesia  Confirmation Checklist:  Correct equipment/implants were available, procedure was appropriate and matched the consent or emergent situation, relevant allergies and patient's identity using two indicators  Time out: Immediately prior to the procedure a time out was called    Universal Protocol: the Joint Commission Universal Protocol was followed    Preparation: Patient was prepped and draped in usual sterile fashion       ANESTHESIA    Anesthesia:  Local infiltration  Local Anesthetic:  Lidocaine 1% without epinephrine  Anesthetic Total (mL):  10      SEDATION  Patient Sedated: Yes    Sedation Type:  Moderate (conscious) sedation  Sedation:  Fentanyl and midazolam  Vital signs: Vital signs monitored during sedation    See dictated procedure note for full details.  Findings: Image guided transplant liver biopsy. Three passes, three cores.     Specimens: core needle biopsy specimens sent for pathological analysis    Procedural Complications: None    Condition: Stable    Plan: Patient to return to . Plan for 1 hour of strict bedrest, 1 hour of bedrest with bathroom privileges. Keep area clean and dry. Avoid any heavy lifting or straining.       PROCEDURE  Describe Procedure: Image guided transplant liver biopsy. Three  passes, three cores (one core was short) sent in formalin. Gelfoam administered into biopsy track. Sterile dressing applied.   Patient Tolerance:  Patient tolerated the procedure well with no immediate complications  Length of time physician/provider present for 1:1 monitoring during sedation:  0-22 min

## 2025-05-12 NOTE — PROGRESS NOTES
Pt discharged after liver biopsy. RUQ site CDI with no bleeding. Pt eating, drinking, eating and voiding. PIV intact upon removal.  is ride home. Pt demonstrated understanding of discharge teaching.

## 2025-05-12 NOTE — IR NOTE
Patient Name: Abiola Matute  Medical Record Number: [id number]  Today's Date: 5/12/2025    Procedure: Transplant Liver Biopsy  Proceduralist: Pilar Adorno PA-C  Pathology present: No    Procedure Start: 1237  Procedure end: 1257  Sedation medications administered: 3 mg Versed and 150 mcg Fentanyl     2 hour bedrest 1300 - 1500    Report given to: ESTER RN    Other Notes: Pt arrived to IR room 6 from . Consent reviewed. Pt denies any questions or concerns regarding procedure. Pt positioned supine and monitored per protocol. Pt tolerated procedure without any noted complications. Pt transferred back to .

## 2025-05-12 NOTE — DISCHARGE INSTRUCTIONS
[facility]    Interventional Radiology  Patient Instructions Following Liver Biopsy    AFTER YOU GO HOME  If you were given sedation, for the first 24 hours: we recommend that an adult stay with you, DO NOT drive or operate machinery at home or at work, DO NOT smoke, DO NOT make any important or legal decisions.  DO NOT drink alcoholic beverages the day of your procedure  Drink plenty of fluids   Resume your regular diet, unless otherwise instructed by your Primary Physician  Relax and take it easy for 48 hours  DO NOT do any strenuous exercise or lifting (> 10 lbs) for at least 3 days following your procedure  Keep the dressing dry and in place for 24 hours. Replace with Band aid for 2 days.  Never leave a wet dressing in place.  Do not take a shower for at least 12 hours following your procedure. No tub bath, hot tub, or swimming for 5 days.  There should be minimum drainage from the biopsy site    CALL THE PHYSICIAN IF:  You start bleeding from the procedure site.  If you do start to bleed from that site, lie down flat and hold pressure on the site for a minimum of 10 minutes.  Your physician will tell you if you need to return to the hospital  You develop nausea or vomiting  You have excessive swelling, redness, or tenderness at the site  You have drainage that looks like it is infected.  You experience severe pain  You develop hives or a rash or unexplained itching  You develop shortness of breath  You develop a temperature of 101 degrees F or greater    [facility] INTERVENTIONAL RADIOLOGY DEPARTMENT  Procedure Physician: Pilar Adorno PA-C                                      Date of procedure: May 12, 2025  Telephone Numbers: [phone]      Monday-Friday 7:30 am to 4:00 pm  [phone] After 4:00 pm Monday-Friday, Weekends & Holidays. Option #4 for the , and then ask for the Interventional Radiologist on call.  Someone is on call 24 hrs/day  [facility] toll free  number: [phone] Monday-Friday 8:00 am to 4:30 pm  [facility] Emergency Dept: [phone]

## 2025-05-12 NOTE — PROGRESS NOTES
Virginia returned s/p liver biopsy  RLQ site soft and dry  VSS, O2 sats 92%  Patient oriented but sleepy  Declines food and drink until she is more awake  2 hour observation

## 2025-05-12 NOTE — PROGRESS NOTES
Pt prepped for liver biopsy. PIV intact, labs in process, consent needs to be signed.  at bedside will be ride home.

## 2025-05-13 ENCOUNTER — RESULTS FOLLOW-UP (OUTPATIENT)
Dept: TRANSPLANT | Facility: CLINIC | Age: 60
End: 2025-05-13

## 2025-05-13 LAB
PATH REPORT.COMMENTS IMP SPEC: NORMAL
PATH REPORT.FINAL DX SPEC: NORMAL
PATH REPORT.GROSS SPEC: NORMAL
PATH REPORT.MICROSCOPIC SPEC OTHER STN: NORMAL
PATH REPORT.RELEVANT HX SPEC: NORMAL
PHOTO IMAGE: NORMAL

## 2025-05-14 ENCOUNTER — TELEPHONE (OUTPATIENT)
Dept: TRANSPLANT | Facility: CLINIC | Age: 60
End: 2025-05-14
Payer: MEDICARE

## 2025-05-14 DIAGNOSIS — T86.41 LIVER TRANSPLANT REJECTION (H): Primary | ICD-10-CM

## 2025-05-14 DIAGNOSIS — K50.113 CROHN'S DISEASE OF LARGE INTESTINE WITH FISTULA (H): ICD-10-CM

## 2025-05-14 DIAGNOSIS — Z94.4 LIVER REPLACED BY TRANSPLANT (H): ICD-10-CM

## 2025-05-14 RX ORDER — METHYLPREDNISOLONE SODIUM SUCCINATE 40 MG/ML
40 INJECTION INTRAMUSCULAR; INTRAVENOUS
Status: CANCELLED
Start: 2025-05-14

## 2025-05-14 RX ORDER — ALBUTEROL SULFATE 0.83 MG/ML
2.5 SOLUTION RESPIRATORY (INHALATION)
Status: CANCELLED | OUTPATIENT
Start: 2025-05-14

## 2025-05-14 RX ORDER — HEPARIN SODIUM,PORCINE 10 UNIT/ML
5-20 VIAL (ML) INTRAVENOUS DAILY PRN
Status: CANCELLED | OUTPATIENT
Start: 2025-05-14

## 2025-05-14 RX ORDER — MEPERIDINE HYDROCHLORIDE 25 MG/ML
25 INJECTION INTRAMUSCULAR; INTRAVENOUS; SUBCUTANEOUS
Status: CANCELLED | OUTPATIENT
Start: 2025-05-14

## 2025-05-14 RX ORDER — HEPARIN SODIUM (PORCINE) LOCK FLUSH IV SOLN 100 UNIT/ML 100 UNIT/ML
5 SOLUTION INTRAVENOUS
Status: CANCELLED | OUTPATIENT
Start: 2025-05-14

## 2025-05-14 RX ORDER — DIPHENHYDRAMINE HYDROCHLORIDE 50 MG/ML
50 INJECTION, SOLUTION INTRAMUSCULAR; INTRAVENOUS
Status: CANCELLED
Start: 2025-05-14

## 2025-05-14 RX ORDER — PREDNISONE 20 MG/1
TABLET ORAL
Qty: 32 TABLET | Refills: 0 | Status: SHIPPED | OUTPATIENT
Start: 2025-05-17 | End: 2025-05-24

## 2025-05-14 RX ORDER — PREDNISONE 5 MG/1
5 TABLET ORAL DAILY
Qty: 30 TABLET | Refills: 1 | Status: SHIPPED | OUTPATIENT
Start: 2025-05-24

## 2025-05-14 RX ORDER — EPINEPHRINE 1 MG/ML
0.3 INJECTION, SOLUTION, CONCENTRATE INTRAVENOUS EVERY 5 MIN PRN
Status: CANCELLED | OUTPATIENT
Start: 2025-05-14

## 2025-05-14 RX ORDER — DIPHENHYDRAMINE HYDROCHLORIDE 50 MG/ML
25 INJECTION, SOLUTION INTRAMUSCULAR; INTRAVENOUS
Status: CANCELLED
Start: 2025-05-14

## 2025-05-14 RX ORDER — ALBUTEROL SULFATE 90 UG/1
1-2 INHALANT RESPIRATORY (INHALATION)
Status: CANCELLED
Start: 2025-05-14

## 2025-05-14 NOTE — TELEPHONE ENCOUNTER
Pts biopsy showed T cell rejection.    Reviewed with dr Cody camejo for dr Agudelo.    Pt to have solumedrol 500 mg IV daily x 3 days. And once daily infusions over will start oral prednisone taper as follows.    A.    Day #4 50mg po QID  b. Day #5 40mg po QID  c. Day #6 40mg po TID  d. Day #7 40mg BID  e. Day #8 40 mg po daily  f. Day #9 20mg po daily  Reviewed plan with patient. She would like message sent of plan in [employer].       Pt was drinking mushroom coffee. Pt aware not to drink this.     Pt wondering if semiglutide should be stopped.Message to dr cody camejo for dr agudelo for clarification.    [facility] can start on 5/15 and patient is scheduled.  Isamar dodd is full all week.    Pt is aware and she will adjust her schedule of oral taper by one day.

## 2025-05-15 ENCOUNTER — INFUSION THERAPY VISIT (OUTPATIENT)
Dept: INFUSION THERAPY | Facility: CLINIC | Age: 60
End: 2025-05-15
Attending: INTERNAL MEDICINE
Payer: MEDICARE

## 2025-05-15 ENCOUNTER — RESULTS FOLLOW-UP (OUTPATIENT)
Dept: TRANSPLANT | Facility: CLINIC | Age: 60
End: 2025-05-15

## 2025-05-15 VITALS
HEART RATE: 74 BPM | SYSTOLIC BLOOD PRESSURE: 124 MMHG | DIASTOLIC BLOOD PRESSURE: 85 MMHG | RESPIRATION RATE: 16 BRPM | TEMPERATURE: 98.6 F | OXYGEN SATURATION: 100 %

## 2025-05-15 DIAGNOSIS — T86.41 LIVER TRANSPLANT REJECTION (H): ICD-10-CM

## 2025-05-15 DIAGNOSIS — T86.41 LIVER TRANSPLANT REJECTION (H): Primary | ICD-10-CM

## 2025-05-15 DIAGNOSIS — Z94.4 LIVER REPLACED BY TRANSPLANT (H): Primary | ICD-10-CM

## 2025-05-15 DIAGNOSIS — K50.113 CROHN'S DISEASE OF LARGE INTESTINE WITH FISTULA (H): ICD-10-CM

## 2025-05-15 LAB
ALBUMIN SERPL BCG-MCNC: 4.4 G/DL (ref 3.5–5.2)
ALP SERPL-CCNC: 164 U/L (ref 40–150)
ALT SERPL W P-5'-P-CCNC: 45 U/L (ref 0–50)
ANION GAP SERPL CALCULATED.3IONS-SCNC: 13 MMOL/L (ref 7–15)
AST SERPL W P-5'-P-CCNC: 44 U/L (ref 0–45)
BASOPHILS # BLD AUTO: 0 10E3/UL (ref 0–0.2)
BASOPHILS NFR BLD AUTO: 1 %
BILIRUB SERPL-MCNC: 1.7 MG/DL
BILIRUBIN DIRECT (ROCHE PRO & PURE): 0.57 MG/DL (ref 0–0.45)
BUN SERPL-MCNC: 22.4 MG/DL (ref 8–23)
CALCIUM SERPL-MCNC: 9.7 MG/DL (ref 8.8–10.4)
CHLORIDE SERPL-SCNC: 100 MMOL/L (ref 98–107)
CREAT SERPL-MCNC: 1.08 MG/DL (ref 0.51–0.95)
EGFRCR SERPLBLD CKD-EPI 2021: 59 ML/MIN/1.73M2
EOSINOPHIL # BLD AUTO: 0.2 10E3/UL (ref 0–0.7)
EOSINOPHIL NFR BLD AUTO: 4 %
ERYTHROCYTE [DISTWIDTH] IN BLOOD BY AUTOMATED COUNT: 13.1 % (ref 10–15)
GLUCOSE SERPL-MCNC: 85 MG/DL (ref 70–99)
HCO3 SERPL-SCNC: 22 MMOL/L (ref 22–29)
HCT VFR BLD AUTO: 36.1 % (ref 35–47)
HGB BLD-MCNC: 12.6 G/DL (ref 11.7–15.7)
IMM GRANULOCYTES # BLD: 0 10E3/UL
IMM GRANULOCYTES NFR BLD: 0 %
LYMPHOCYTES # BLD AUTO: 1.1 10E3/UL (ref 0.8–5.3)
LYMPHOCYTES NFR BLD AUTO: 28 %
MCH RBC QN AUTO: 29.5 PG (ref 26.5–33)
MCHC RBC AUTO-ENTMCNC: 34.9 G/DL (ref 31.5–36.5)
MCV RBC AUTO: 85 FL (ref 78–100)
MONOCYTES # BLD AUTO: 0.3 10E3/UL (ref 0–1.3)
MONOCYTES NFR BLD AUTO: 8 %
NEUTROPHILS # BLD AUTO: 2.3 10E3/UL (ref 1.6–8.3)
NEUTROPHILS NFR BLD AUTO: 59 %
NRBC # BLD AUTO: 0 10E3/UL
NRBC BLD AUTO-RTO: 0 /100
PLATELET # BLD AUTO: 218 10E3/UL (ref 150–450)
POTASSIUM SERPL-SCNC: 4 MMOL/L (ref 3.4–5.3)
PROT SERPL-MCNC: 7.7 G/DL (ref 6.4–8.3)
RBC # BLD AUTO: 4.27 10E6/UL (ref 3.8–5.2)
SODIUM SERPL-SCNC: 135 MMOL/L (ref 135–145)
WBC # BLD AUTO: 3.9 10E3/UL (ref 4–11)

## 2025-05-15 PROCEDURE — 258N000003 HC RX IP 258 OP 636: Performed by: INTERNAL MEDICINE

## 2025-05-15 PROCEDURE — 82248 BILIRUBIN DIRECT: CPT | Performed by: INTERNAL MEDICINE

## 2025-05-15 PROCEDURE — 85025 COMPLETE CBC W/AUTO DIFF WBC: CPT | Performed by: INTERNAL MEDICINE

## 2025-05-15 PROCEDURE — 36415 COLL VENOUS BLD VENIPUNCTURE: CPT | Performed by: INTERNAL MEDICINE

## 2025-05-15 PROCEDURE — 250N000011 HC RX IP 250 OP 636: Performed by: INTERNAL MEDICINE

## 2025-05-15 PROCEDURE — 82310 ASSAY OF CALCIUM: CPT | Performed by: INTERNAL MEDICINE

## 2025-05-15 RX ORDER — MEPERIDINE HYDROCHLORIDE 25 MG/ML
25 INJECTION INTRAMUSCULAR; INTRAVENOUS; SUBCUTANEOUS
OUTPATIENT
Start: 2025-05-16

## 2025-05-15 RX ORDER — DIPHENHYDRAMINE HYDROCHLORIDE 50 MG/ML
25 INJECTION, SOLUTION INTRAMUSCULAR; INTRAVENOUS
Start: 2025-05-16

## 2025-05-15 RX ORDER — EPINEPHRINE 1 MG/ML
0.3 INJECTION, SOLUTION INTRAMUSCULAR; SUBCUTANEOUS EVERY 5 MIN PRN
OUTPATIENT
Start: 2025-05-16

## 2025-05-15 RX ORDER — HEPARIN SODIUM,PORCINE 10 UNIT/ML
5-20 VIAL (ML) INTRAVENOUS DAILY PRN
OUTPATIENT
Start: 2025-05-16

## 2025-05-15 RX ORDER — DIPHENHYDRAMINE HYDROCHLORIDE 50 MG/ML
50 INJECTION, SOLUTION INTRAMUSCULAR; INTRAVENOUS
Start: 2025-05-16

## 2025-05-15 RX ORDER — ALBUTEROL SULFATE 90 UG/1
1-2 INHALANT RESPIRATORY (INHALATION)
Start: 2025-05-16

## 2025-05-15 RX ORDER — HEPARIN SODIUM (PORCINE) LOCK FLUSH IV SOLN 100 UNIT/ML 100 UNIT/ML
5 SOLUTION INTRAVENOUS
OUTPATIENT
Start: 2025-05-16

## 2025-05-15 RX ORDER — ALBUTEROL SULFATE 0.83 MG/ML
2.5 SOLUTION RESPIRATORY (INHALATION)
OUTPATIENT
Start: 2025-05-16

## 2025-05-15 RX ORDER — METHYLPREDNISOLONE SODIUM SUCCINATE 40 MG/ML
40 INJECTION INTRAMUSCULAR; INTRAVENOUS
Start: 2025-05-16

## 2025-05-15 RX ADMIN — SODIUM CHLORIDE 500 MG: 9 INJECTION, SOLUTION INTRAVENOUS at 12:49

## 2025-05-15 ASSESSMENT — PAIN SCALES - GENERAL: PAINLEVEL_OUTOF10: NO PAIN (0)

## 2025-05-15 NOTE — PROGRESS NOTES
Infusion Nursing Note:  Abiola Matute presents today for   Chief Complaint   Patient presents with    Infusion     SOT - Methylprednisolone (Solumedrol)       Patient seen by provider today: No   present during visit today: No    Note:   -Orders from Jeannette Cervantes MD completed. Frequency: daily x3 - this is dose 1 of 3.  -Labs were [facility] by [facility].  -500mg Solu-medrol IV over 30min.    Intravenous Access:  PIV [facility] in Lt forearm by [facility].    Treatment Conditions:  Glucose   Date Value   05/15/2025 85     Post Infusion Assessment:  Patient tolerated infusion without incident.  Blood return noted pre and post infusion.  Site patent and intact, free from redness, edema or discomfort.  No evidence of extravasations.  Access discontinued per protocol.     Discharge Plan:   Discharge instructions reviewed with: Patient.  Patient and/or family verbalized understanding of discharge instructions and all questions answered.  AVS to patient via [employer].    Patient discharged in stable condition accompanied by: , Albino.  Departure Mode: Ambulatory.    Future Appointments   Date Time Provider Department Center   5/16/2025 11:30 AM [facility] INFUSION NURSE [facility]   5/17/2025 10:00 AM [facility] INFUSION NURSE [facility]       Suad Land, RN    /85 (BP Location: Right arm, Patient Position: Chair, Cuff Size: Adult Regular)   Pulse 74   Temp 98.6  F (37  C)   Resp 16   LMP 05/31/2006   SpO2 100%     Administrations This Visit       methylPREDNISolone sodium succinate (solu-MEDROL) 500 mg in sodium chloride 0.9 % 114 mL intermittent infusion       Admin Date  05/15/2025 Action  $New Bag Dose  500 mg Rate  228 mL/hr Route  Intravenous Documented By  Suad Land, RN

## 2025-05-16 ENCOUNTER — RESULTS FOLLOW-UP (OUTPATIENT)
Dept: TRANSPLANT | Facility: CLINIC | Age: 60
End: 2025-05-16

## 2025-05-16 DIAGNOSIS — T86.41 LIVER TRANSPLANT REJECTION (H): Primary | ICD-10-CM

## 2025-05-17 ENCOUNTER — INFUSION THERAPY VISIT (OUTPATIENT)
Dept: INFUSION THERAPY | Facility: CLINIC | Age: 60
End: 2025-05-17
Attending: INTERNAL MEDICINE
Payer: MEDICARE

## 2025-05-17 VITALS
HEART RATE: 71 BPM | RESPIRATION RATE: 16 BRPM | TEMPERATURE: 98.7 F | DIASTOLIC BLOOD PRESSURE: 84 MMHG | OXYGEN SATURATION: 97 % | SYSTOLIC BLOOD PRESSURE: 133 MMHG

## 2025-05-17 DIAGNOSIS — T86.41 LIVER TRANSPLANT REJECTION (H): ICD-10-CM

## 2025-05-17 DIAGNOSIS — K50.113 CROHN'S DISEASE OF LARGE INTESTINE WITH FISTULA (H): ICD-10-CM

## 2025-05-17 DIAGNOSIS — Z94.4 LIVER REPLACED BY TRANSPLANT (H): Primary | ICD-10-CM

## 2025-05-17 LAB
ALBUMIN SERPL BCG-MCNC: 4.2 G/DL (ref 3.5–5.2)
ALP SERPL-CCNC: 131 U/L (ref 40–150)
ALT SERPL W P-5'-P-CCNC: 33 U/L (ref 0–50)
ANION GAP SERPL CALCULATED.3IONS-SCNC: 12 MMOL/L (ref 7–15)
AST SERPL W P-5'-P-CCNC: 28 U/L (ref 0–45)
BASOPHILS # BLD AUTO: 0 10E3/UL (ref 0–0.2)
BASOPHILS NFR BLD AUTO: 0 %
BILIRUB SERPL-MCNC: 0.8 MG/DL
BILIRUBIN DIRECT (ROCHE PRO & PURE): 0.3 MG/DL (ref 0–0.45)
BUN SERPL-MCNC: 26.4 MG/DL (ref 8–23)
CALCIUM SERPL-MCNC: 8.9 MG/DL (ref 8.8–10.4)
CHLORIDE SERPL-SCNC: 107 MMOL/L (ref 98–107)
CREAT SERPL-MCNC: 1.01 MG/DL (ref 0.51–0.95)
EGFRCR SERPLBLD CKD-EPI 2021: 63 ML/MIN/1.73M2
EOSINOPHIL # BLD AUTO: 0 10E3/UL (ref 0–0.7)
EOSINOPHIL NFR BLD AUTO: 0 %
ERYTHROCYTE [DISTWIDTH] IN BLOOD BY AUTOMATED COUNT: 13 % (ref 10–15)
GLUCOSE SERPL-MCNC: 102 MG/DL (ref 70–99)
HCO3 SERPL-SCNC: 20 MMOL/L (ref 22–29)
HCT VFR BLD AUTO: 32.3 % (ref 35–47)
HGB BLD-MCNC: 11.3 G/DL (ref 11.7–15.7)
IMM GRANULOCYTES # BLD: 0 10E3/UL
IMM GRANULOCYTES NFR BLD: 0 %
LYMPHOCYTES # BLD AUTO: 1 10E3/UL (ref 0.8–5.3)
LYMPHOCYTES NFR BLD AUTO: 11 %
MCH RBC QN AUTO: 29.7 PG (ref 26.5–33)
MCHC RBC AUTO-ENTMCNC: 35 G/DL (ref 31.5–36.5)
MCV RBC AUTO: 85 FL (ref 78–100)
MONOCYTES # BLD AUTO: 0.6 10E3/UL (ref 0–1.3)
MONOCYTES NFR BLD AUTO: 6 %
NEUTROPHILS # BLD AUTO: 7.4 10E3/UL (ref 1.6–8.3)
NEUTROPHILS NFR BLD AUTO: 83 %
NRBC # BLD AUTO: 0 10E3/UL
NRBC BLD AUTO-RTO: 0 /100
PLATELET # BLD AUTO: 191 10E3/UL (ref 150–450)
POTASSIUM SERPL-SCNC: 3.8 MMOL/L (ref 3.4–5.3)
PROT SERPL-MCNC: 7 G/DL (ref 6.4–8.3)
RBC # BLD AUTO: 3.81 10E6/UL (ref 3.8–5.2)
SODIUM SERPL-SCNC: 139 MMOL/L (ref 135–145)
WBC # BLD AUTO: 9 10E3/UL (ref 4–11)

## 2025-05-17 PROCEDURE — 36415 COLL VENOUS BLD VENIPUNCTURE: CPT | Performed by: INTERNAL MEDICINE

## 2025-05-17 PROCEDURE — 258N000003 HC RX IP 258 OP 636: Performed by: INTERNAL MEDICINE

## 2025-05-17 PROCEDURE — 80048 BASIC METABOLIC PNL TOTAL CA: CPT | Performed by: INTERNAL MEDICINE

## 2025-05-17 PROCEDURE — 85025 COMPLETE CBC W/AUTO DIFF WBC: CPT | Performed by: INTERNAL MEDICINE

## 2025-05-17 PROCEDURE — 250N000011 HC RX IP 250 OP 636: Mod: JZ | Performed by: INTERNAL MEDICINE

## 2025-05-17 PROCEDURE — 82248 BILIRUBIN DIRECT: CPT | Performed by: INTERNAL MEDICINE

## 2025-05-17 PROCEDURE — 96365 THER/PROPH/DIAG IV INF INIT: CPT

## 2025-05-17 RX ORDER — METHYLPREDNISOLONE SODIUM SUCCINATE 40 MG/ML
40 INJECTION INTRAMUSCULAR; INTRAVENOUS
Status: CANCELLED
Start: 2025-05-17

## 2025-05-17 RX ORDER — DIPHENHYDRAMINE HYDROCHLORIDE 50 MG/ML
50 INJECTION, SOLUTION INTRAMUSCULAR; INTRAVENOUS
Status: CANCELLED
Start: 2025-05-17

## 2025-05-17 RX ORDER — MEPERIDINE HYDROCHLORIDE 25 MG/ML
25 INJECTION INTRAMUSCULAR; INTRAVENOUS; SUBCUTANEOUS
Status: CANCELLED | OUTPATIENT
Start: 2025-05-17

## 2025-05-17 RX ORDER — HEPARIN SODIUM,PORCINE 10 UNIT/ML
5-20 VIAL (ML) INTRAVENOUS DAILY PRN
Status: CANCELLED | OUTPATIENT
Start: 2025-05-17

## 2025-05-17 RX ORDER — HEPARIN SODIUM (PORCINE) LOCK FLUSH IV SOLN 100 UNIT/ML 100 UNIT/ML
5 SOLUTION INTRAVENOUS
Status: CANCELLED | OUTPATIENT
Start: 2025-05-17

## 2025-05-17 RX ORDER — DIPHENHYDRAMINE HYDROCHLORIDE 50 MG/ML
25 INJECTION, SOLUTION INTRAMUSCULAR; INTRAVENOUS
Status: CANCELLED
Start: 2025-05-17

## 2025-05-17 RX ORDER — EPINEPHRINE 1 MG/ML
0.3 INJECTION, SOLUTION INTRAMUSCULAR; SUBCUTANEOUS EVERY 5 MIN PRN
Status: CANCELLED | OUTPATIENT
Start: 2025-05-17

## 2025-05-17 RX ORDER — ALBUTEROL SULFATE 90 UG/1
1-2 INHALANT RESPIRATORY (INHALATION)
Status: CANCELLED
Start: 2025-05-17

## 2025-05-17 RX ORDER — ALBUTEROL SULFATE 0.83 MG/ML
2.5 SOLUTION RESPIRATORY (INHALATION)
Status: CANCELLED | OUTPATIENT
Start: 2025-05-17

## 2025-05-17 RX ADMIN — SODIUM CHLORIDE 500 MG: 9 INJECTION, SOLUTION INTRAVENOUS at 09:55

## 2025-05-17 NOTE — PROGRESS NOTES
Nursing Note  Abiola Matute presents today to [facility] for:   Chief Complaint   Patient presents with    Infusion     methylPREDNISolone sodium succinate (solu-MEDROL)    Abiola Matute presents today for Solu-Medrol (dose 2 of 3).    Patient seen by provider today: No   present during visit today: Not Applicable.    Note: Solu-Medrol infused over 30 mins.    Intravenous Access:  Labs drawn without difficulty.  Peripheral IV placed by VAT. PIV was left in place from 5-16 due to requiring VAT.     Treatment Conditions:      Post Infusion Assessment:  Patient tolerated infusion without incident.  Site patent and intact, free from redness, edema or discomfort.  No evidence of extravasations.  Access discontinued per protocol.     Discharge Plan:   AVS to patient via MYCHART.  Patient will return  for next appointment.   Future Appointments 5/17/2025 - 11/13/2025        Date Visit Type Length Department Provider     5/22/2025 11:45 AM MA SCREENING BILATERAL 15 min RV MAMMOGRAPHY [email or web address]    Location Instructions:     A staff member will escort you to the [facility].              5/27/2025 10:30 AM LIVER POST RETURN TXP HEPT 30 min  HEPATOLOGY Jeannette Cervantes MD    Location Instructions:     Due to [place] [address], travel times to this location may be longer than usual. Please plan for extra travel time and check the [place] Department of Transportation [address] [place] website for delay, closure, and detour information.  The [facility] ([facility]) is in a dense urban area with multiple transportation and parking options. You may wish to review options for  service and self-parking in more detail on the [facility] s website at [email or web address].  This appointment is in a hospital-based location. Before your visit, you may want to check with your insurance company for coverage and referral options, including cost  differences between services provided in different clinic settings. For more information visit this link on the [employer] Website: [email or web address]              7/7/2025 10:30 AM INFUSION 2.5 HR (150 MIN) 30 min SH CANCER INFUSION SH CANCER INFUSION NURSE     7/7/2025 10:30 AM INFUSION 2.5 HR (150 MIN) 150 min SH CANCER INFUSION SH INFUSION CHAIR    Location Instructions:     This appointment is in a hospital-based location.&nbsp; Before your visit, you may want to check with your insurance company for coverage and referral options, including cost differences between services provided in different clinic settings.&nbsp; For more information visit this link on the [employer] Website:&nbsp; [email or web address]                     Patient discharged in stable condition accompanied by: self.  Departure Mode: Ambulatory.      /74   Pulse 71   Temp 98.7  F (37.1  C) (Oral)   Resp 16   LMP 05/31/2006   SpO2 97%

## 2025-05-19 ENCOUNTER — RESULTS FOLLOW-UP (OUTPATIENT)
Dept: TRANSPLANT | Facility: CLINIC | Age: 60
End: 2025-05-19

## 2025-05-19 DIAGNOSIS — T86.41 LIVER TRANSPLANT REJECTION (H): Primary | ICD-10-CM

## 2025-05-22 ENCOUNTER — LAB (OUTPATIENT)
Dept: LAB | Facility: CLINIC | Age: 60
End: 2025-05-22
Payer: MEDICARE

## 2025-05-22 ENCOUNTER — TELEPHONE (OUTPATIENT)
Dept: TRANSPLANT | Facility: CLINIC | Age: 60
End: 2025-05-22

## 2025-05-22 ENCOUNTER — RESULTS FOLLOW-UP (OUTPATIENT)
Dept: TRANSPLANT | Facility: CLINIC | Age: 60
End: 2025-05-22

## 2025-05-22 DIAGNOSIS — Z94.4 S/P LIVER TRANSPLANT (H): ICD-10-CM

## 2025-05-22 DIAGNOSIS — Z94.4 LIVER REPLACED BY TRANSPLANT (H): ICD-10-CM

## 2025-05-22 DIAGNOSIS — T86.41 LIVER TRANSPLANT REJECTION (H): Primary | ICD-10-CM

## 2025-05-22 DIAGNOSIS — Z94.4 LIVER TRANSPLANTED (H): ICD-10-CM

## 2025-05-22 DIAGNOSIS — K70.31 ALCOHOLIC CIRRHOSIS OF LIVER WITH ASCITES (H): ICD-10-CM

## 2025-05-22 DIAGNOSIS — F10.11 ALCOHOL ABUSE, IN REMISSION: ICD-10-CM

## 2025-05-22 LAB
ALBUMIN SERPL BCG-MCNC: 4.2 G/DL (ref 3.5–5.2)
ALP SERPL-CCNC: 107 U/L (ref 40–150)
ALT SERPL W P-5'-P-CCNC: 65 U/L (ref 0–50)
ANION GAP SERPL CALCULATED.3IONS-SCNC: 10 MMOL/L (ref 7–15)
AST SERPL W P-5'-P-CCNC: 28 U/L (ref 0–45)
BILIRUB DIRECT SERPL-MCNC: 0.27 MG/DL (ref 0–0.3)
BILIRUB SERPL-MCNC: 0.9 MG/DL
BUN SERPL-MCNC: 30.8 MG/DL (ref 8–23)
CALCIUM SERPL-MCNC: 8.9 MG/DL (ref 8.8–10.4)
CHLORIDE SERPL-SCNC: 103 MMOL/L (ref 98–107)
CREAT SERPL-MCNC: 0.94 MG/DL (ref 0.51–0.95)
CYCLOSPORINE BLD LC/MS/MS-MCNC: 74 UG/L (ref 50–400)
EGFRCR SERPLBLD CKD-EPI 2021: 69 ML/MIN/1.73M2
ERYTHROCYTE [DISTWIDTH] IN BLOOD BY AUTOMATED COUNT: 13 % (ref 10–15)
GLUCOSE SERPL-MCNC: 117 MG/DL (ref 70–99)
HCO3 SERPL-SCNC: 22 MMOL/L (ref 22–29)
HCT VFR BLD AUTO: 36 % (ref 35–47)
HGB BLD-MCNC: 12.8 G/DL (ref 11.7–15.7)
MAGNESIUM SERPL-MCNC: 2.2 MG/DL (ref 1.7–2.3)
MCH RBC QN AUTO: 29.7 PG (ref 26.5–33)
MCHC RBC AUTO-ENTMCNC: 35.6 G/DL (ref 31.5–36.5)
MCV RBC AUTO: 84 FL (ref 78–100)
PHOSPHATE SERPL-MCNC: 3 MG/DL (ref 2.5–4.5)
PLATELET # BLD AUTO: 236 10E3/UL (ref 150–450)
POTASSIUM SERPL-SCNC: 4 MMOL/L (ref 3.4–5.3)
PROT SERPL-MCNC: 6.9 G/DL (ref 6.4–8.3)
RBC # BLD AUTO: 4.31 10E6/UL (ref 3.8–5.2)
SODIUM SERPL-SCNC: 135 MMOL/L (ref 135–145)
TME LAST DOSE: NORMAL H
TME LAST DOSE: NORMAL H
WBC # BLD AUTO: 8.2 10E3/UL (ref 4–11)

## 2025-05-22 PROCEDURE — 80048 BASIC METABOLIC PNL TOTAL CA: CPT

## 2025-05-22 PROCEDURE — 84100 ASSAY OF PHOSPHORUS: CPT

## 2025-05-22 PROCEDURE — 82248 BILIRUBIN DIRECT: CPT

## 2025-05-22 PROCEDURE — 85014 HEMATOCRIT: CPT

## 2025-05-22 PROCEDURE — 80158 DRUG ASSAY CYCLOSPORINE: CPT

## 2025-05-22 PROCEDURE — 80321 ALCOHOLS BIOMARKERS 1OR 2: CPT

## 2025-05-22 PROCEDURE — 36415 COLL VENOUS BLD VENIPUNCTURE: CPT

## 2025-05-22 PROCEDURE — 83735 ASSAY OF MAGNESIUM: CPT

## 2025-05-22 NOTE — TELEPHONE ENCOUNTER
PT's CSA level 74. Appears as 12 hour level. Pt taking 125 mg BID. Requested patient to increase to 150 mg BID.    Left message with plan.

## 2025-05-27 ENCOUNTER — OFFICE VISIT (OUTPATIENT)
Dept: GASTROENTEROLOGY | Facility: CLINIC | Age: 60
End: 2025-05-27
Attending: INTERNAL MEDICINE
Payer: MEDICARE

## 2025-05-27 VITALS
DIASTOLIC BLOOD PRESSURE: 86 MMHG | OXYGEN SATURATION: 98 % | WEIGHT: 182 LBS | BODY MASS INDEX: 30.29 KG/M2 | TEMPERATURE: 98.6 F | SYSTOLIC BLOOD PRESSURE: 119 MMHG | HEART RATE: 84 BPM

## 2025-05-27 DIAGNOSIS — T86.41 LIVER TRANSPLANT REJECTION (H): Primary | ICD-10-CM

## 2025-05-27 DIAGNOSIS — Z94.4 LIVER REPLACED BY TRANSPLANT (H): ICD-10-CM

## 2025-05-27 PROCEDURE — G0463 HOSPITAL OUTPT CLINIC VISIT: HCPCS | Performed by: INTERNAL MEDICINE

## 2025-05-27 ASSESSMENT — PAIN SCALES - GENERAL: PAINLEVEL_OUTOF10: NO PAIN (0)

## 2025-05-27 NOTE — LETTER
5/27/2025      Abiola Matute  [address] [address]      Dear Colleague,    Thank you for referring your patient, Abiola Matute, to the [facility] [place]. Please see a copy of my visit note below.    [facility]  LIVER TRANSPLANT CLINIC      A/P  Ms. 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    Post op course c/b    -fever, rash (thought 2/2 dapsone): resolved  -hypoMag, hypoCa: resolved  -seizure (tac stopped)L: resolved  -significant scalp hair loss: resolved  -rejection 5/2025    Rejection  --Mild T cell-mediated/acute cellular rejection, ONEIL = 3-4/9 had solumedrol and increased CSA, added prednisone, was on taper (from 40 bid five days ago, 5 mg now.)  ALT 5/22/25 is 65, from 33 on 5/17. She will get labs Thursday. CSA was on the low end at 83. Currently 74 with increase from 125 to 150. IS plan pending labs.    Graft function Excellent. AP mildly elevated, stable. Will watch closely.  HCC No HCC on explant. Will need HCC surveillance   IS see above Taken off tac due to seizures. Continue monitoring labs and IS level to assess for appropriate dosing and side effects from IS including RADHA, pancytopenia, hyperkalemia, hypomagnesemia.    Seizure on Keppra. Rec she meet with neurologist to assess for weaning off Keppra.    HYPOMG: resolved, not on supplement     Hematoma improved on imaging    RTC 3-4 mo in person or video  Jeannette Cervantes MD  Hepatology/ Liver Transplant  [facility]  ========================================================  PCP: Linda Macdonald NP    SUBJECTIVE  Ms. 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 Here today with her .    Post op course c/b    -fever, rash (thought 2/2 dapsone), resolved  -hypoMag, hypoCa, resolved  -seizure (tac stopped), on Keppra  -significant scalp hair loss, resolved  -fluid collection  on imaging  -rejection Mild T cell-mediated/acute cellular rejection, ONEIL = 3-4/9 had solumedrol and increased CSA, added prednisone (taper, now 5 mg). ALT 5/22/25 is 65, from 33 on 5/17. She will get labs Thursday. CSA was on the low end at 83. Currently 74 with increase from 125 to 150.    She was taking an everyday supplement (some kind of mushroom coffee) for a few weeks. She has stopped this.     MRI 11/29/24   1.  Postoperative changes of liver transplant. No intrahepatic lesions are identified.  2.  There is a complex collection along the undersurface of the right hepatic lobe abutting the liver capsule. This measures 3.1 x 1.9 cm and shows no appreciable enhancement. There is high T1 signal within this collection likely reflecting old blood products as well as 2 areas of artifact which correspond to surgical clips seen on the prior CT. This collection was much larger on the previous CT and is likely postoperative changes with debris and blood products from the prior surgery. This can be monitored with a follow-up noncontrast CT in approximately 3-4 months to assess for continued regression and/or stability. If this patient develops any right upper quadrant pain or symptoms this could be reassessed at that time.  3.  Cholecystectomy. No biliary dilatation.  4.  Subcentimeter cortical cyst right kidney requires no additional follow-up      HCC  3.2 cm seg 7. . PreLT treatment MWA 8/29/23.  EXPLANT:  A. 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:   -See gross description for ablation site size   -No evidence to suggest pre-treatment vascular invasion or extrahepatic extension   -Advanced background cirrhosis, inactive (Laennec fibrosis stage 4C):   -Compatible with DUNN/MASLD etiology, now burnt out    -The PAS and PAS-D stains show no abnormal alpha-1-antitrypsin profile      -Iron stain is positive (2-3+) for  stainable parenchymal and Kupffer iron    -Bile ducts are present in normal numbers and architecture  Gallbladder: Cholelithiases with chronic cholecystitis     IS: CSA   LABS: Up to date    Liver Function Studies -   Recent Labs   Lab Test 05/22/25  0842   PROTTOTAL 6.9   ALBUMIN 4.2   BILITOTAL 0.9   ALKPHOS 107   AST 28   ALT 65*     CBC RESULTS:   Recent Labs   Lab Test 05/22/25  0842   WBC 8.2   RBC 4.31   HGB 12.8   HCT 36.0   MCV 84   MCH 29.7   MCHC 35.6   RDW 13.0            Liver Function Studies -   Recent Labs   Lab Test 01/16/25  0840   PROTTOTAL 7.6   ALBUMIN 4.3   BILITOTAL 0.9   ALKPHOS 205*   AST 27   ALT 37     CBC RESULTS:   Recent Labs   Lab Test 01/16/25  0840   WBC 5.0   RBC 3.96   HGB 11.7   HCT 34.5*   MCV 87   MCH 29.5   MCHC 33.9   RDW 13.8            Liver Function Studies -   Recent Labs   Lab Test 10/10/24  0813   PROTTOTAL 7.5   ALBUMIN 4.3   BILITOTAL 0.8   ALKPHOS 170*   AST 30   ALT 24     CBC RESULTS:   Recent Labs   Lab Test 10/10/24  0813   WBC 2.5*   RBC 4.54   HGB 12.3   HCT 39.4   MCV 87   MCH 27.1   MCHC 31.2*   RDW 14.7          REJECTION: None.  BILIARY ISSUES: None  STENT: out on AXR 9/23/24  KIDNEY FUNCTION:   Creatinine   Date Value Ref Range Status   05/22/2025 0.94 0.51 - 0.95 mg/dL Final   02/23/2021 0.71 0.52 - 1.04 mg/dL Final     BP: Good. No meds.  PREV:  Colonoscopy   Derm   Pap/pelvic   Mammo   Flu shot  DISEASE RECURRENCE: no alcohol since preLT  HCC surveillance: MRI 11/2024  OTHER ISSUES:  NEW ISSUES:     SOC: at home with   ROS: 10 point ROS neg other than the symptoms noted above in the HPI.    Exam  /86   Pulse 84   Temp 98.6  F (37  C) (Oral)   Wt 82.6 kg (182 lb)   LMP 05/31/2006   SpO2 98%   BMI 30.29 kg/m      Gen Alert pleasant NAD  Resp No difficulty breathing. No cough  Skin No Jaundice  Eyes No icterus  Neuro HERRERA  Abd scar well healed. No hernia  MSK no muscle wasting  Psyche Pleasant, appropriate. Well  groomed.      Creatinine   Date Value Ref Range Status   05/22/2025 0.94 0.51 - 0.95 mg/dL Final   02/23/2021 0.71 0.52 - 1.04 mg/dL Final   ]   Lab Results   Component Value Date    BILITOTAL 0.8 10/10/2024    BILITOTAL 0.6 10/03/2024    BILITOTAL 0.8 09/23/2024    BILITOTAL 0.5 09/19/2024    BILITOTAL 0.7 09/12/2024    BILITOTAL 1.8 06/22/2021    BILITOTAL 1.9 04/27/2021    BILITOTAL 1.3 03/02/2021    BILITOTAL 1.9 10/27/2020    BILITOTAL 2.1 09/04/2020      Lab Results   Component Value Date    ALT 24 10/10/2024    ALT 52 06/22/2021      Lab Results   Component Value Date    ALBUMIN 4.3 10/10/2024    ALBUMIN 3.3 10/03/2022    ALBUMIN 3.6 06/22/2021       Hemoglobin   Date Value Ref Range Status   05/22/2025 12.8 11.7 - 15.7 g/dL Final   06/22/2021 13.7 11.7 - 15.7 g/dL Final   ]    Current Outpatient Medications   Medication Sig Dispense Refill     COMPOUNDED NON-CONTROLLED SUBSTANCE (CMPD RX) - PHARMACY TO MIX COMPOUNDED MEDICATION Compounded sublingual Semaglutide 2mg/ml Take 0.75 ml (1.5 mg) once daily. 3 mL 2     cycloSPORINE modified (GENERIC EQUIVALENT) 100 MG capsule Take 1 capsule (100 mg) by mouth every 12 hours. Total dose 125mg  capsule 3     cycloSPORINE modified (GENERIC EQUIVALENT) 25 MG capsule Take 1 capsule (25 mg) by mouth every 12 hours. Total dose 125mg  capsule 3     furosemide (LASIX) 20 MG tablet Take 1 tablet (20 mg) by mouth every other day. 45 tablet 3     levETIRAcetam (KEPPRA) 1000 MG tablet Take 1 tablet (1,000 mg) by mouth 3 times daily. 180 tablet 3     multivitamin w/minerals (THERA-VIT-M) tablet Take 1 tablet by mouth daily.       predniSONE (DELTASONE) 5 MG tablet Take 1 tablet (5 mg) by mouth daily. 30 tablet 1     psyllium (METAMUCIL) WAFR Take 2 Wafers by mouth daily 60 Wafer 2     midazolam 5 mg/mL (VERSED) 5 mg/mL intranasal solution Apply 1 mL (5 mg) into one nostril as directed once as needed for seizures (1 squirt in JUST ONE nostril.  To be used for GTC  greater than 3 to 4 minutes, focal seizures [smaller seizures] greater than 10 minutes, 3 or more GTC or focal seizures within 24 hours). (Patient not taking: Reported on 5/27/2025) 2 mL 2     No current facility-administered medications for this visit.     Facility-Administered Medications Ordered in Other Visits   Medication Dose Route Frequency Provider Last Rate Last Admin     hyoscyamine (LEVSIN/SL) sublingual tablet 125 mcg  125 mcg Sublingual Q4H PRN Farhan Schilling MD         The longitudinal plan of care for the diagnosis(es)/condition(s) as documented were addressed during this visit. Due to the added complexity in care, I will continue to support [place] in the subsequent management and with ongoing continuity of care.      Again, thank you for allowing me to participate in the care of your patient.        Sincerely,        Jeannette Cervantes MD    Electronically signed

## 2025-05-27 NOTE — NURSING NOTE
Chief Complaint   Patient presents with    RECHECK     LIVER POST RETURN TXP HEPT         BP Readings from Last 3 Encounters:   05/27/25 119/86   05/17/25 133/84   05/16/25 136/85       /86   Pulse 84   Temp 98.6  F (37  C) (Oral)   Wt 82.6 kg (182 lb)   LMP 05/31/2006   SpO2 98%   BMI 30.29 kg/m       Alia Doss

## 2025-05-27 NOTE — PROGRESS NOTES
[facility]  LIVER TRANSPLANT CLINIC      A/P  Ms. 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    Post op course c/b    -fever, rash (thought 2/2 dapsone): resolved  -hypoMag, hypoCa: resolved  -seizure (tac stopped)L: resolved  -significant scalp hair loss: resolved  -rejection 5/2025    Rejection  --Mild T cell-mediated/acute cellular rejection, ONEIL = 3-4/9 had solumedrol and increased CSA, added prednisone, was on taper (from 40 bid five days ago, 5 mg now.)  ALT 5/22/25 is 65, from 33 on 5/17. She will get labs Thursday. CSA was on the low end at 83. Currently 74 with increase from 125 to 150. IS plan pending labs.    Graft function Excellent. AP mildly elevated, stable. Will watch closely.  HCC No HCC on explant. Will need HCC surveillance   IS see above Taken off tac due to seizures. Continue monitoring labs and IS level to assess for appropriate dosing and side effects from IS including RADHA, pancytopenia, hyperkalemia, hypomagnesemia.    Seizure on Keppra. Rec she meet with neurologist to assess for weaning off Keppra.    HYPOMG: resolved, not on supplement     Hematoma improved on imaging    RTC 3-4 mo in person or video  Jeannette Cervantes MD  Hepatology/ Liver Transplant  [facility]  ========================================================  PCP: Linda Macdonald NP    SUBJECTIVE  Ms. 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 Here today with her .    Post op course c/b    -fever, rash (thought 2/2 dapsone), resolved  -hypoMag, hypoCa, resolved  -seizure (tac stopped), on Keppra  -significant scalp hair loss, resolved  -fluid collection on imaging  -rejection Mild T cell-mediated/acute cellular rejection, ONEIL = 3-4/9 had solumedrol and increased CSA, added prednisone (taper, now 5 mg). ALT 5/22/25 is 65, from 33 on 5/17. She will get labs Thursday. CSA was on the low end at 83. Currently 74  with increase from 125 to 150.    She was taking an everyday supplement (some kind of mushroom coffee) for a few weeks. She has stopped this.     MRI 11/29/24   1.  Postoperative changes of liver transplant. No intrahepatic lesions are identified.  2.  There is a complex collection along the undersurface of the right hepatic lobe abutting the liver capsule. This measures 3.1 x 1.9 cm and shows no appreciable enhancement. There is high T1 signal within this collection likely reflecting old blood products as well as 2 areas of artifact which correspond to surgical clips seen on the prior CT. This collection was much larger on the previous CT and is likely postoperative changes with debris and blood products from the prior surgery. This can be monitored with a follow-up noncontrast CT in approximately 3-4 months to assess for continued regression and/or stability. If this patient develops any right upper quadrant pain or symptoms this could be reassessed at that time.  3.  Cholecystectomy. No biliary dilatation.  4.  Subcentimeter cortical cyst right kidney requires no additional follow-up      HCC  3.2 cm seg 7. . PreLT treatment MWA 8/29/23.  EXPLANT:  A. 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:   -See gross description for ablation site size   -No evidence to suggest pre-treatment vascular invasion or extrahepatic extension   -Advanced background cirrhosis, inactive (Laennec fibrosis stage 4C):   -Compatible with DUNN/MASLD etiology, now burnt out    -The PAS and PAS-D stains show no abnormal alpha-1-antitrypsin profile      -Iron stain is positive (2-3+) for stainable parenchymal and Kupffer iron    -Bile ducts are present in normal numbers and architecture  Gallbladder: Cholelithiases with chronic cholecystitis     IS: CSA   LABS: Up to date    Liver Function Studies -   Recent Labs   Lab Test 05/22/25  5169    PROTTOTAL 6.9   ALBUMIN 4.2   BILITOTAL 0.9   ALKPHOS 107   AST 28   ALT 65*     CBC RESULTS:   Recent Labs   Lab Test 05/22/25  0842   WBC 8.2   RBC 4.31   HGB 12.8   HCT 36.0   MCV 84   MCH 29.7   MCHC 35.6   RDW 13.0            Liver Function Studies -   Recent Labs   Lab Test 01/16/25  0840   PROTTOTAL 7.6   ALBUMIN 4.3   BILITOTAL 0.9   ALKPHOS 205*   AST 27   ALT 37     CBC RESULTS:   Recent Labs   Lab Test 01/16/25  0840   WBC 5.0   RBC 3.96   HGB 11.7   HCT 34.5*   MCV 87   MCH 29.5   MCHC 33.9   RDW 13.8            Liver Function Studies -   Recent Labs   Lab Test 10/10/24  0813   PROTTOTAL 7.5   ALBUMIN 4.3   BILITOTAL 0.8   ALKPHOS 170*   AST 30   ALT 24     CBC RESULTS:   Recent Labs   Lab Test 10/10/24  0813   WBC 2.5*   RBC 4.54   HGB 12.3   HCT 39.4   MCV 87   MCH 27.1   MCHC 31.2*   RDW 14.7          REJECTION: None.  BILIARY ISSUES: None  STENT: out on AXR 9/23/24  KIDNEY FUNCTION:   Creatinine   Date Value Ref Range Status   05/22/2025 0.94 0.51 - 0.95 mg/dL Final   02/23/2021 0.71 0.52 - 1.04 mg/dL Final     BP: Good. No meds.  PREV:  Colonoscopy   Derm   Pap/pelvic   Mammo   Flu shot  DISEASE RECURRENCE: no alcohol since preLT  HCC surveillance: MRI 11/2024  OTHER ISSUES:  NEW ISSUES:     SOC: at home with   ROS: 10 point ROS neg other than the symptoms noted above in the HPI.    Exam  /86   Pulse 84   Temp 98.6  F (37  C) (Oral)   Wt 82.6 kg (182 lb)   LMP 05/31/2006   SpO2 98%   BMI 30.29 kg/m      Gen Alert pleasant NAD  Resp No difficulty breathing. No cough  Skin No Jaundice  Eyes No icterus  Neuro HERRERA  Abd scar well healed. No hernia  MSK no muscle wasting  Psyche Pleasant, appropriate. Well groomed.      Creatinine   Date Value Ref Range Status   05/22/2025 0.94 0.51 - 0.95 mg/dL Final   02/23/2021 0.71 0.52 - 1.04 mg/dL Final   ]   Lab Results   Component Value Date    BILITOTAL 0.8 10/10/2024    BILITOTAL 0.6 10/03/2024    BILITOTAL 0.8  09/23/2024    BILITOTAL 0.5 09/19/2024    BILITOTAL 0.7 09/12/2024    BILITOTAL 1.8 06/22/2021    BILITOTAL 1.9 04/27/2021    BILITOTAL 1.3 03/02/2021    BILITOTAL 1.9 10/27/2020    BILITOTAL 2.1 09/04/2020      Lab Results   Component Value Date    ALT 24 10/10/2024    ALT 52 06/22/2021      Lab Results   Component Value Date    ALBUMIN 4.3 10/10/2024    ALBUMIN 3.3 10/03/2022    ALBUMIN 3.6 06/22/2021       Hemoglobin   Date Value Ref Range Status   05/22/2025 12.8 11.7 - 15.7 g/dL Final   06/22/2021 13.7 11.7 - 15.7 g/dL Final   ]    Current Outpatient Medications   Medication Sig Dispense Refill    COMPOUNDED NON-CONTROLLED SUBSTANCE (CMPD RX) - PHARMACY TO MIX COMPOUNDED MEDICATION Compounded sublingual Semaglutide 2mg/ml Take 0.75 ml (1.5 mg) once daily. 3 mL 2    cycloSPORINE modified (GENERIC EQUIVALENT) 100 MG capsule Take 1 capsule (100 mg) by mouth every 12 hours. Total dose 125mg  capsule 3    cycloSPORINE modified (GENERIC EQUIVALENT) 25 MG capsule Take 1 capsule (25 mg) by mouth every 12 hours. Total dose 125mg  capsule 3    furosemide (LASIX) 20 MG tablet Take 1 tablet (20 mg) by mouth every other day. 45 tablet 3    levETIRAcetam (KEPPRA) 1000 MG tablet Take 1 tablet (1,000 mg) by mouth 3 times daily. 180 tablet 3    multivitamin w/minerals (THERA-VIT-M) tablet Take 1 tablet by mouth daily.      predniSONE (DELTASONE) 5 MG tablet Take 1 tablet (5 mg) by mouth daily. 30 tablet 1    psyllium (METAMUCIL) WAFR Take 2 Wafers by mouth daily 60 Wafer 2    midazolam 5 mg/mL (VERSED) 5 mg/mL intranasal solution Apply 1 mL (5 mg) into one nostril as directed once as needed for seizures (1 squirt in JUST ONE nostril.  To be used for GTC greater than 3 to 4 minutes, focal seizures [smaller seizures] greater than 10 minutes, 3 or more GTC or focal seizures within 24 hours). (Patient not taking: Reported on 5/27/2025) 2 mL 2     No current facility-administered medications for this visit.      Facility-Administered Medications Ordered in Other Visits   Medication Dose Route Frequency Provider Last Rate Last Admin    hyoscyamine (LEVSIN/SL) sublingual tablet 125 mcg  125 mcg Sublingual Q4H PRN Farhan Schilling MD         The longitudinal plan of care for the diagnosis(es)/condition(s) as documented were addressed during this visit. Due to the added complexity in care, I will continue to support [place] in the subsequent management and with ongoing continuity of care.

## 2025-05-29 ENCOUNTER — RESULTS FOLLOW-UP (OUTPATIENT)
Dept: GASTROENTEROLOGY | Facility: CLINIC | Age: 60
End: 2025-05-29

## 2025-05-29 ENCOUNTER — LAB (OUTPATIENT)
Dept: LAB | Facility: CLINIC | Age: 60
End: 2025-05-29
Payer: MEDICARE

## 2025-05-29 DIAGNOSIS — F10.11 ALCOHOL ABUSE, IN REMISSION: ICD-10-CM

## 2025-05-29 DIAGNOSIS — T86.41 LIVER TRANSPLANT REJECTION (H): Primary | ICD-10-CM

## 2025-05-29 DIAGNOSIS — K70.31 ALCOHOLIC CIRRHOSIS OF LIVER WITH ASCITES (H): ICD-10-CM

## 2025-05-29 DIAGNOSIS — Z94.4 LIVER REPLACED BY TRANSPLANT (H): ICD-10-CM

## 2025-05-29 DIAGNOSIS — R56.9 SEIZURES (H): Primary | ICD-10-CM

## 2025-05-29 LAB
ALBUMIN SERPL BCG-MCNC: 4 G/DL (ref 3.5–5.2)
ALP SERPL-CCNC: 104 U/L (ref 40–150)
ALT SERPL W P-5'-P-CCNC: 58 U/L (ref 0–50)
ANION GAP SERPL CALCULATED.3IONS-SCNC: 11 MMOL/L (ref 7–15)
AST SERPL W P-5'-P-CCNC: 26 U/L (ref 0–45)
BILIRUB DIRECT SERPL-MCNC: 0.33 MG/DL (ref 0–0.3)
BILIRUB SERPL-MCNC: 1.3 MG/DL
BUN SERPL-MCNC: 32 MG/DL (ref 8–23)
CALCIUM SERPL-MCNC: 8.7 MG/DL (ref 8.8–10.4)
CHLORIDE SERPL-SCNC: 100 MMOL/L (ref 98–107)
CREAT SERPL-MCNC: 0.84 MG/DL (ref 0.51–0.95)
CYCLOSPORINE BLD LC/MS/MS-MCNC: 146 UG/L (ref 50–400)
EGFRCR SERPLBLD CKD-EPI 2021: 79 ML/MIN/1.73M2
ERYTHROCYTE [DISTWIDTH] IN BLOOD BY AUTOMATED COUNT: 13.8 % (ref 10–15)
GLUCOSE SERPL-MCNC: 90 MG/DL (ref 70–99)
HCO3 SERPL-SCNC: 24 MMOL/L (ref 22–29)
HCT VFR BLD AUTO: 36.9 % (ref 35–47)
HGB BLD-MCNC: 12 G/DL (ref 11.7–15.7)
MAGNESIUM SERPL-MCNC: 1.9 MG/DL (ref 1.7–2.3)
MCH RBC QN AUTO: 28.9 PG (ref 26.5–33)
MCHC RBC AUTO-ENTMCNC: 32.5 G/DL (ref 31.5–36.5)
MCV RBC AUTO: 89 FL (ref 78–100)
PHOSPHATE SERPL-MCNC: 3.2 MG/DL (ref 2.5–4.5)
PLATELET # BLD AUTO: 186 10E3/UL (ref 150–450)
POTASSIUM SERPL-SCNC: 4.5 MMOL/L (ref 3.4–5.3)
PROT SERPL-MCNC: 6.6 G/DL (ref 6.4–8.3)
RBC # BLD AUTO: 4.15 10E6/UL (ref 3.8–5.2)
SODIUM SERPL-SCNC: 135 MMOL/L (ref 135–145)
TME LAST DOSE: NORMAL H
TME LAST DOSE: NORMAL H
WBC # BLD AUTO: 5.7 10E3/UL (ref 4–11)

## 2025-05-29 PROCEDURE — 83735 ASSAY OF MAGNESIUM: CPT

## 2025-05-29 PROCEDURE — 80321 ALCOHOLS BIOMARKERS 1OR 2: CPT

## 2025-05-29 PROCEDURE — 36415 COLL VENOUS BLD VENIPUNCTURE: CPT

## 2025-05-29 PROCEDURE — 84100 ASSAY OF PHOSPHORUS: CPT

## 2025-05-29 PROCEDURE — 82248 BILIRUBIN DIRECT: CPT

## 2025-05-29 PROCEDURE — 80158 DRUG ASSAY CYCLOSPORINE: CPT

## 2025-05-29 PROCEDURE — 82310 ASSAY OF CALCIUM: CPT

## 2025-05-29 PROCEDURE — 85041 AUTOMATED RBC COUNT: CPT

## 2025-05-29 RX ORDER — LEVETIRACETAM 1000 MG/1
1000 TABLET ORAL 3 TIMES DAILY
Qty: 90 TABLET | Refills: 0 | Status: SHIPPED | OUTPATIENT
Start: 2025-05-29

## 2025-05-29 RX ORDER — CYCLOSPORINE 25 MG/1
50 CAPSULE, LIQUID FILLED ORAL EVERY 12 HOURS
Qty: 360 CAPSULE | Refills: 3 | Status: SHIPPED | OUTPATIENT
Start: 2025-05-29

## 2025-05-29 NOTE — TELEPHONE ENCOUNTER
Health Call Center    Phone Message    May a detailed message be left on voicemail: yes     Reason for Call: Medication Refill Request    Has the patient contacted the pharmacy for the refill? Yes   Name of medication being requested: levETIRAcetam (KEPPRA) 1000 MG tablet   Provider who prescribed the medication: Dr. Lugo  Pharmacy: [facility]  [address] [place] [address]  Phone: [phone]   Date medication is needed: 5/30/25    Pt states that [facility] will not have her medication delivered until 6/4/25 so Pt is requesting a 5 day supply of medication listed above be sent to her local pharmacy [place]. Pt states she will be out of the medication tomorrow. PT requesting this be processed before EOD tomorrow 5/30/25    Action Taken: Message routed to:  [facility] Neurology     Travel Screening: Not Applicable

## 2025-06-02 ENCOUNTER — MYC MEDICAL ADVICE (OUTPATIENT)
Dept: FAMILY MEDICINE | Facility: CLINIC | Age: 60
End: 2025-06-02
Payer: MEDICARE

## 2025-06-02 DIAGNOSIS — E66.811 CLASS 1 OBESITY WITH SERIOUS COMORBIDITY AND BODY MASS INDEX (BMI) OF 31.0 TO 31.9 IN ADULT, UNSPECIFIED OBESITY TYPE: ICD-10-CM

## 2025-06-02 NOTE — TELEPHONE ENCOUNTER
4/29/2025    Please see my chart message below     Please review and advise     Thank you     Stacy Byrne RN, BSN  [place] [facility]

## 2025-06-07 ENCOUNTER — LAB (OUTPATIENT)
Dept: LAB | Facility: CLINIC | Age: 60
End: 2025-06-07
Payer: MEDICARE

## 2025-06-07 DIAGNOSIS — T86.41 LIVER TRANSPLANT REJECTION (H): ICD-10-CM

## 2025-06-07 DIAGNOSIS — Z94.4 LIVER REPLACED BY TRANSPLANT (H): ICD-10-CM

## 2025-06-07 LAB
ALBUMIN SERPL BCG-MCNC: 3.7 G/DL (ref 3.5–5.2)
ALP SERPL-CCNC: 115 U/L (ref 40–150)
ALT SERPL W P-5'-P-CCNC: 47 U/L (ref 0–50)
ANION GAP SERPL CALCULATED.3IONS-SCNC: 10 MMOL/L (ref 7–15)
AST SERPL W P-5'-P-CCNC: 28 U/L (ref 0–45)
BILIRUB DIRECT SERPL-MCNC: 0.26 MG/DL (ref 0–0.3)
BILIRUB SERPL-MCNC: 0.9 MG/DL
BUN SERPL-MCNC: 23.7 MG/DL (ref 8–23)
CALCIUM SERPL-MCNC: 8.6 MG/DL (ref 8.8–10.4)
CHLORIDE SERPL-SCNC: 104 MMOL/L (ref 98–107)
CREAT SERPL-MCNC: 0.83 MG/DL (ref 0.51–0.95)
EGFRCR SERPLBLD CKD-EPI 2021: 80 ML/MIN/1.73M2
ERYTHROCYTE [DISTWIDTH] IN BLOOD BY AUTOMATED COUNT: 14.6 % (ref 10–15)
GLUCOSE SERPL-MCNC: 92 MG/DL (ref 70–99)
HCO3 SERPL-SCNC: 24 MMOL/L (ref 22–29)
HCT VFR BLD AUTO: 34.7 % (ref 35–47)
HGB BLD-MCNC: 11.5 G/DL (ref 11.7–15.7)
MAGNESIUM SERPL-MCNC: 1.7 MG/DL (ref 1.7–2.3)
MCH RBC QN AUTO: 29.6 PG (ref 26.5–33)
MCHC RBC AUTO-ENTMCNC: 33.1 G/DL (ref 31.5–36.5)
MCV RBC AUTO: 89 FL (ref 78–100)
PHOSPHATE SERPL-MCNC: 3.7 MG/DL (ref 2.5–4.5)
PLATELET # BLD AUTO: 160 10E3/UL (ref 150–450)
POTASSIUM SERPL-SCNC: 4 MMOL/L (ref 3.4–5.3)
PROT SERPL-MCNC: 6.4 G/DL (ref 6.4–8.3)
RBC # BLD AUTO: 3.88 10E6/UL (ref 3.8–5.2)
SODIUM SERPL-SCNC: 138 MMOL/L (ref 135–145)
WBC # BLD AUTO: 2.2 10E3/UL (ref 4–11)

## 2025-06-07 PROCEDURE — 84100 ASSAY OF PHOSPHORUS: CPT

## 2025-06-07 PROCEDURE — 84450 TRANSFERASE (AST) (SGOT): CPT

## 2025-06-07 PROCEDURE — 80158 DRUG ASSAY CYCLOSPORINE: CPT

## 2025-06-07 PROCEDURE — 83735 ASSAY OF MAGNESIUM: CPT

## 2025-06-07 PROCEDURE — 85027 COMPLETE CBC AUTOMATED: CPT

## 2025-06-07 PROCEDURE — 80069 RENAL FUNCTION PANEL: CPT

## 2025-06-07 PROCEDURE — 36415 COLL VENOUS BLD VENIPUNCTURE: CPT

## 2025-06-08 LAB
CYCLOSPORINE BLD LC/MS/MS-MCNC: 110 UG/L (ref 50–400)
TME LAST DOSE: NORMAL H
TME LAST DOSE: NORMAL H

## 2025-06-09 ENCOUNTER — RESULTS FOLLOW-UP (OUTPATIENT)
Dept: TRANSPLANT | Facility: CLINIC | Age: 60
End: 2025-06-09

## 2025-06-09 DIAGNOSIS — T86.41 LIVER TRANSPLANT REJECTION (H): Primary | ICD-10-CM

## 2025-06-14 ENCOUNTER — LAB (OUTPATIENT)
Dept: LAB | Facility: CLINIC | Age: 60
End: 2025-06-14
Payer: MEDICARE

## 2025-06-14 DIAGNOSIS — Z94.4 LIVER REPLACED BY TRANSPLANT (H): ICD-10-CM

## 2025-06-14 LAB
ALBUMIN SERPL BCG-MCNC: 3.9 G/DL (ref 3.5–5.2)
ALP SERPL-CCNC: 117 U/L (ref 40–150)
ALT SERPL W P-5'-P-CCNC: 37 U/L (ref 0–50)
ANION GAP SERPL CALCULATED.3IONS-SCNC: 10 MMOL/L (ref 7–15)
AST SERPL W P-5'-P-CCNC: 24 U/L (ref 0–45)
BILIRUB DIRECT SERPL-MCNC: 0.25 MG/DL (ref 0–0.3)
BILIRUB SERPL-MCNC: 0.9 MG/DL
BUN SERPL-MCNC: 28.2 MG/DL (ref 8–23)
CALCIUM SERPL-MCNC: 8.7 MG/DL (ref 8.8–10.4)
CHLORIDE SERPL-SCNC: 105 MMOL/L (ref 98–107)
CREAT SERPL-MCNC: 0.91 MG/DL (ref 0.51–0.95)
CYCLOSPORINE BLD LC/MS/MS-MCNC: 124 UG/L (ref 50–400)
EGFRCR SERPLBLD CKD-EPI 2021: 72 ML/MIN/1.73M2
ERYTHROCYTE [DISTWIDTH] IN BLOOD BY AUTOMATED COUNT: 14.4 % (ref 10–15)
GLUCOSE SERPL-MCNC: 90 MG/DL (ref 70–99)
HCO3 SERPL-SCNC: 25 MMOL/L (ref 22–29)
HCT VFR BLD AUTO: 35.1 % (ref 35–47)
HGB BLD-MCNC: 11.7 G/DL (ref 11.7–15.7)
MAGNESIUM SERPL-MCNC: 1.9 MG/DL (ref 1.7–2.3)
MCH RBC QN AUTO: 29.8 PG (ref 26.5–33)
MCHC RBC AUTO-ENTMCNC: 33.3 G/DL (ref 31.5–36.5)
MCV RBC AUTO: 89 FL (ref 78–100)
PHOSPHATE SERPL-MCNC: 3.6 MG/DL (ref 2.5–4.5)
PLATELET # BLD AUTO: 265 10E3/UL (ref 150–450)
POTASSIUM SERPL-SCNC: 4 MMOL/L (ref 3.4–5.3)
PROT SERPL-MCNC: 6.6 G/DL (ref 6.4–8.3)
RBC # BLD AUTO: 3.93 10E6/UL (ref 3.8–5.2)
SODIUM SERPL-SCNC: 140 MMOL/L (ref 135–145)
TME LAST DOSE: NORMAL H
TME LAST DOSE: NORMAL H
WBC # BLD AUTO: 3.4 10E3/UL (ref 4–11)

## 2025-06-14 PROCEDURE — 82310 ASSAY OF CALCIUM: CPT

## 2025-06-14 PROCEDURE — 82248 BILIRUBIN DIRECT: CPT

## 2025-06-14 PROCEDURE — 84100 ASSAY OF PHOSPHORUS: CPT

## 2025-06-14 PROCEDURE — 36415 COLL VENOUS BLD VENIPUNCTURE: CPT

## 2025-06-14 PROCEDURE — 85018 HEMOGLOBIN: CPT

## 2025-06-14 PROCEDURE — 83735 ASSAY OF MAGNESIUM: CPT

## 2025-06-14 PROCEDURE — 80158 DRUG ASSAY CYCLOSPORINE: CPT

## 2025-06-16 ENCOUNTER — RESULTS FOLLOW-UP (OUTPATIENT)
Dept: TRANSPLANT | Facility: CLINIC | Age: 60
End: 2025-06-16

## 2025-06-16 DIAGNOSIS — T86.41 LIVER TRANSPLANT REJECTION (H): Primary | ICD-10-CM

## 2025-06-16 NOTE — TELEPHONE ENCOUNTER
Reviewed liver labs with dr coulter. Patient taking prednisone 15 mg daily.   Pt to decrease prednisone to 14 mg daily and continue with labs weekly and will try and decrease prednisone in 2 weeks.

## 2025-06-16 NOTE — TELEPHONE ENCOUNTER
Sent a [employer] message for prednisone dose change and confirmation.     Pt confirmed through [employer].

## 2025-06-17 RX ORDER — PREDNISONE 1 MG/1
4 TABLET ORAL DAILY
Qty: 120 TABLET | Refills: 11 | Status: SHIPPED | OUTPATIENT
Start: 2025-06-17

## 2025-06-17 RX ORDER — PREDNISONE 5 MG/1
10 TABLET ORAL DAILY
Qty: 60 TABLET | Refills: 11 | Status: SHIPPED | OUTPATIENT
Start: 2025-06-17

## 2025-06-18 ENCOUNTER — PATIENT OUTREACH (OUTPATIENT)
Dept: CARE COORDINATION | Facility: CLINIC | Age: 60
End: 2025-06-18
Payer: MEDICARE

## 2025-06-20 ENCOUNTER — RESULTS FOLLOW-UP (OUTPATIENT)
Dept: TRANSPLANT | Facility: CLINIC | Age: 60
End: 2025-06-20

## 2025-06-20 DIAGNOSIS — T86.41 LIVER TRANSPLANT REJECTION (H): Primary | ICD-10-CM

## 2025-06-27 ENCOUNTER — RESULTS FOLLOW-UP (OUTPATIENT)
Dept: TRANSPLANT | Facility: CLINIC | Age: 60
End: 2025-06-27

## 2025-06-27 ENCOUNTER — LAB (OUTPATIENT)
Dept: LAB | Facility: CLINIC | Age: 60
End: 2025-06-27
Payer: MEDICARE

## 2025-06-27 DIAGNOSIS — K70.31 ALCOHOLIC CIRRHOSIS OF LIVER WITH ASCITES (H): ICD-10-CM

## 2025-06-27 DIAGNOSIS — T86.41 LIVER TRANSPLANT REJECTION (H): ICD-10-CM

## 2025-06-27 DIAGNOSIS — T86.41 LIVER TRANSPLANT REJECTION (H): Primary | ICD-10-CM

## 2025-06-27 DIAGNOSIS — Z94.4 LIVER REPLACED BY TRANSPLANT (H): ICD-10-CM

## 2025-06-27 DIAGNOSIS — F10.11 ALCOHOL ABUSE, IN REMISSION: ICD-10-CM

## 2025-06-27 LAB
ALBUMIN SERPL BCG-MCNC: 4.1 G/DL (ref 3.5–5.2)
ALP SERPL-CCNC: 101 U/L (ref 40–150)
ALT SERPL W P-5'-P-CCNC: 28 U/L (ref 0–50)
ANION GAP SERPL CALCULATED.3IONS-SCNC: 13 MMOL/L (ref 7–15)
AST SERPL W P-5'-P-CCNC: 19 U/L (ref 0–45)
BILIRUB DIRECT SERPL-MCNC: 0.29 MG/DL (ref 0–0.3)
BILIRUB SERPL-MCNC: 1.2 MG/DL
BUN SERPL-MCNC: 35.6 MG/DL (ref 8–23)
CALCIUM SERPL-MCNC: 9 MG/DL (ref 8.8–10.4)
CHLORIDE SERPL-SCNC: 103 MMOL/L (ref 98–107)
CREAT SERPL-MCNC: 0.98 MG/DL (ref 0.51–0.95)
CYCLOSPORINE BLD LC/MS/MS-MCNC: 137 UG/L (ref 50–400)
EGFRCR SERPLBLD CKD-EPI 2021: 66 ML/MIN/1.73M2
ERYTHROCYTE [DISTWIDTH] IN BLOOD BY AUTOMATED COUNT: 14.6 % (ref 10–15)
GLUCOSE SERPL-MCNC: 83 MG/DL (ref 70–99)
HCO3 SERPL-SCNC: 23 MMOL/L (ref 22–29)
HCT VFR BLD AUTO: 36.6 % (ref 35–47)
HGB BLD-MCNC: 12.3 G/DL (ref 11.7–15.7)
HOLD SPECIMEN: NORMAL
HOLD SPECIMEN: NORMAL
MAGNESIUM SERPL-MCNC: 2.1 MG/DL (ref 1.7–2.3)
MCH RBC QN AUTO: 30.1 PG (ref 26.5–33)
MCHC RBC AUTO-ENTMCNC: 33.6 G/DL (ref 31.5–36.5)
MCV RBC AUTO: 90 FL (ref 78–100)
PHOSPHATE SERPL-MCNC: 3.9 MG/DL (ref 2.5–4.5)
PLATELET # BLD AUTO: 254 10E3/UL (ref 150–450)
POTASSIUM SERPL-SCNC: 4.1 MMOL/L (ref 3.4–5.3)
PROT SERPL-MCNC: 6.8 G/DL (ref 6.4–8.3)
RBC # BLD AUTO: 4.09 10E6/UL (ref 3.8–5.2)
SODIUM SERPL-SCNC: 139 MMOL/L (ref 135–145)
TME LAST DOSE: NORMAL H
TME LAST DOSE: NORMAL H
WBC # BLD AUTO: 5.6 10E3/UL (ref 4–11)

## 2025-06-27 PROCEDURE — 85018 HEMOGLOBIN: CPT

## 2025-06-27 PROCEDURE — 80158 DRUG ASSAY CYCLOSPORINE: CPT

## 2025-06-27 PROCEDURE — 82248 BILIRUBIN DIRECT: CPT

## 2025-06-27 PROCEDURE — 83735 ASSAY OF MAGNESIUM: CPT

## 2025-06-27 PROCEDURE — 84100 ASSAY OF PHOSPHORUS: CPT

## 2025-06-27 PROCEDURE — 36415 COLL VENOUS BLD VENIPUNCTURE: CPT

## 2025-06-27 PROCEDURE — 82947 ASSAY GLUCOSE BLOOD QUANT: CPT

## 2025-06-30 ENCOUNTER — TELEPHONE (OUTPATIENT)
Dept: TRANSPLANT | Facility: CLINIC | Age: 60
End: 2025-06-30
Payer: MEDICARE

## 2025-06-30 DIAGNOSIS — T86.41 LIVER TRANSPLANT REJECTION (H): Primary | ICD-10-CM

## 2025-06-30 RX ORDER — PREDNISONE 1 MG/1
3 TABLET ORAL DAILY
Qty: 90 TABLET | Refills: 2 | Status: SHIPPED | OUTPATIENT
Start: 2025-06-30

## 2025-06-30 RX ORDER — PREDNISONE 5 MG/1
10 TABLET ORAL DAILY
Qty: 60 TABLET | Refills: 11 | Status: SHIPPED | OUTPATIENT
Start: 2025-06-30

## 2025-06-30 NOTE — TELEPHONE ENCOUNTER
Pt taking prednisone 14 mg daily.   Reviewed labs with dr coulter. Pt to decrease prednisone to 13 mg daily and repeat labs in a week.  Pt did decline peth test with 6/27 labs. Reviewed with dr coulter pt to have peth test.    Contacted [place]. No answer. Left message    Spoke with Rosa in the lab at [facility]. Pt was wanting only specific test drawn. The lab cannot force the blood draw if the patient refuses. Updated dr coulter.      [place] did return [employer] message that she will decrease prednisone 13 mg daily

## 2025-07-01 ENCOUNTER — TELEPHONE (OUTPATIENT)
Dept: PHARMACY | Facility: CLINIC | Age: 60
End: 2025-07-01
Payer: MEDICARE

## 2025-07-01 NOTE — TELEPHONE ENCOUNTER
Clinical Pharmacy Consult:                                                      Transplant Specific: 12 month post transplant pharmacy review.   Date of Transplant: 06/22/2024  Type of Transplant: liver  First Transplant: yes  History of rejection: no    Immunosuppression Regimen    mg qAM & 150 mg qPM, and Prednsione 13mg qDay    Most recent level: 137, date 6/27/25  Pt adherent to lab draws: yes  Scr: 0.98  Lab Results   Component Value Date    CR 0.69 07/22/2024    CR 0.71 02/23/2021     Side effects: none    Prophylactic Medications  PJP Prophylactic: Inhaled Pentamidine  - completed    Antifungal: Not needed thus far      CMV Prophylactic: Valcyte - completed    Acid Reducer: Protonix (pantoprazole) - pt not filling      Vascular Prophylactic: Aspirin - pt stopped    Blood Pressure Management:   Most recent BP: 119/86 at OV 5/27    Hospitalizations/ER visits since last assessment: 0.     Med rec/DUR performed: yes.   Med rec discrepancies: yes, stopped ASA    Unable to reach patient for 12 month post transplant discharge medication review.  Chart and profile review was completed. Patient is adherent to refills and lab.    Refills up to date, nothing needed today. Follow up in 1 year.    Brendan Castellanos, Pharm D  [facility]

## 2025-07-03 ENCOUNTER — DOCUMENTATION ONLY (OUTPATIENT)
Dept: MULTI SPECIALTY CLINIC | Facility: CLINIC | Age: 60
End: 2025-07-03

## 2025-07-03 ENCOUNTER — LAB (OUTPATIENT)
Dept: LAB | Facility: CLINIC | Age: 60
End: 2025-07-03
Payer: MEDICARE

## 2025-07-03 DIAGNOSIS — T86.41 LIVER TRANSPLANT REJECTION (H): ICD-10-CM

## 2025-07-03 DIAGNOSIS — F55.8 ABUSE OF OTHER NON-PSYCHOACTIVE SUBSTANCES: ICD-10-CM

## 2025-07-03 DIAGNOSIS — F10.90 ALCOHOL USE DISORDER: Primary | ICD-10-CM

## 2025-07-03 LAB
ALBUMIN SERPL BCG-MCNC: 4.1 G/DL (ref 3.5–5.2)
ALP SERPL-CCNC: 97 U/L (ref 40–150)
ALT SERPL W P-5'-P-CCNC: 30 U/L (ref 0–50)
ANION GAP SERPL CALCULATED.3IONS-SCNC: 10 MMOL/L (ref 7–15)
AST SERPL W P-5'-P-CCNC: 23 U/L (ref 0–45)
BILIRUB DIRECT SERPL-MCNC: 0.24 MG/DL (ref 0–0.3)
BILIRUB SERPL-MCNC: 0.9 MG/DL
BUN SERPL-MCNC: 34.7 MG/DL (ref 8–23)
CALCIUM SERPL-MCNC: 9.2 MG/DL (ref 8.8–10.4)
CHLORIDE SERPL-SCNC: 105 MMOL/L (ref 98–107)
CREAT SERPL-MCNC: 0.98 MG/DL (ref 0.51–0.95)
CYCLOSPORINE BLD LC/MS/MS-MCNC: 132 UG/L (ref 50–400)
EGFRCR SERPLBLD CKD-EPI 2021: 65 ML/MIN/1.73M2
ERYTHROCYTE [DISTWIDTH] IN BLOOD BY AUTOMATED COUNT: 14.4 % (ref 10–15)
GLUCOSE SERPL-MCNC: 91 MG/DL (ref 70–99)
HCO3 SERPL-SCNC: 25 MMOL/L (ref 22–29)
HCT VFR BLD AUTO: 35.6 % (ref 35–47)
HGB BLD-MCNC: 11.7 G/DL (ref 11.7–15.7)
MAGNESIUM SERPL-MCNC: 2.1 MG/DL (ref 1.7–2.3)
MCH RBC QN AUTO: 29.8 PG (ref 26.5–33)
MCHC RBC AUTO-ENTMCNC: 32.9 G/DL (ref 31.5–36.5)
MCV RBC AUTO: 91 FL (ref 78–100)
PHOSPHATE SERPL-MCNC: 4.6 MG/DL (ref 2.5–4.5)
PLATELET # BLD AUTO: 209 10E3/UL (ref 150–450)
POTASSIUM SERPL-SCNC: 4.2 MMOL/L (ref 3.4–5.3)
PROT SERPL-MCNC: 6.6 G/DL (ref 6.4–8.3)
RBC # BLD AUTO: 3.93 10E6/UL (ref 3.8–5.2)
SODIUM SERPL-SCNC: 140 MMOL/L (ref 135–145)
TME LAST DOSE: NORMAL H
TME LAST DOSE: NORMAL H
WBC # BLD AUTO: 5.5 10E3/UL (ref 4–11)

## 2025-07-03 PROCEDURE — 36415 COLL VENOUS BLD VENIPUNCTURE: CPT

## 2025-07-03 PROCEDURE — 85027 COMPLETE CBC AUTOMATED: CPT

## 2025-07-03 PROCEDURE — 84100 ASSAY OF PHOSPHORUS: CPT

## 2025-07-03 PROCEDURE — 83735 ASSAY OF MAGNESIUM: CPT

## 2025-07-03 PROCEDURE — 80158 DRUG ASSAY CYCLOSPORINE: CPT

## 2025-07-03 PROCEDURE — 80048 BASIC METABOLIC PNL TOTAL CA: CPT

## 2025-07-03 PROCEDURE — 82310 ASSAY OF CALCIUM: CPT

## 2025-07-03 PROCEDURE — 84450 TRANSFERASE (AST) (SGOT): CPT

## 2025-07-03 PROCEDURE — 82248 BILIRUBIN DIRECT: CPT

## 2025-07-03 NOTE — PROGRESS NOTES
Received a message that the patient has declined PETH testing on 2 separate occasions.    Recommend that the patient get labs within the next 7 days - PETH and urine EtG

## 2025-07-07 ENCOUNTER — INFUSION THERAPY VISIT (OUTPATIENT)
Dept: INFUSION THERAPY | Facility: CLINIC | Age: 60
End: 2025-07-07
Attending: PHYSICIAN ASSISTANT
Payer: MEDICARE

## 2025-07-07 ENCOUNTER — TELEPHONE (OUTPATIENT)
Dept: TRANSPLANT | Facility: CLINIC | Age: 60
End: 2025-07-07

## 2025-07-07 VITALS
WEIGHT: 198 LBS | SYSTOLIC BLOOD PRESSURE: 138 MMHG | DIASTOLIC BLOOD PRESSURE: 89 MMHG | OXYGEN SATURATION: 99 % | HEART RATE: 80 BPM | BODY MASS INDEX: 32.95 KG/M2 | RESPIRATION RATE: 16 BRPM | TEMPERATURE: 98.9 F

## 2025-07-07 DIAGNOSIS — T86.41 LIVER TRANSPLANT REJECTION (H): Primary | ICD-10-CM

## 2025-07-07 DIAGNOSIS — K50.119 CROHN'S DISEASE OF PERIANAL REGION WITH COMPLICATION (H): Primary | ICD-10-CM

## 2025-07-07 PROCEDURE — 258N000003 HC RX IP 258 OP 636: Performed by: INTERNAL MEDICINE

## 2025-07-07 PROCEDURE — 250N000011 HC RX IP 250 OP 636: Mod: JZ | Performed by: INTERNAL MEDICINE

## 2025-07-07 PROCEDURE — 96413 CHEMO IV INFUSION 1 HR: CPT

## 2025-07-07 RX ORDER — METHYLPREDNISOLONE SODIUM SUCCINATE 40 MG/ML
40 INJECTION INTRAMUSCULAR; INTRAVENOUS
Start: 2025-08-28

## 2025-07-07 RX ORDER — DIPHENHYDRAMINE HYDROCHLORIDE 50 MG/ML
50 INJECTION, SOLUTION INTRAMUSCULAR; INTRAVENOUS
Start: 2025-08-28

## 2025-07-07 RX ORDER — ALBUTEROL SULFATE 0.83 MG/ML
2.5 SOLUTION RESPIRATORY (INHALATION)
OUTPATIENT
Start: 2025-08-28

## 2025-07-07 RX ORDER — DIPHENHYDRAMINE HYDROCHLORIDE 50 MG/ML
25 INJECTION, SOLUTION INTRAMUSCULAR; INTRAVENOUS
Start: 2025-08-28

## 2025-07-07 RX ORDER — ALBUTEROL SULFATE 90 UG/1
1-2 INHALANT RESPIRATORY (INHALATION)
Start: 2025-08-28

## 2025-07-07 RX ORDER — MEPERIDINE HYDROCHLORIDE 25 MG/ML
25 INJECTION INTRAMUSCULAR; INTRAVENOUS; SUBCUTANEOUS
OUTPATIENT
Start: 2025-08-28

## 2025-07-07 RX ORDER — EPINEPHRINE 1 MG/ML
0.3 INJECTION, SOLUTION INTRAMUSCULAR; SUBCUTANEOUS EVERY 5 MIN PRN
OUTPATIENT
Start: 2025-08-28

## 2025-07-07 RX ORDER — PREDNISONE 1 MG/1
2 TABLET ORAL DAILY
Qty: 60 TABLET | Refills: 2 | Status: SHIPPED | OUTPATIENT
Start: 2025-07-07

## 2025-07-07 RX ORDER — PREDNISONE 5 MG/1
10 TABLET ORAL DAILY
Qty: 60 TABLET | Refills: 11 | Status: SHIPPED | OUTPATIENT
Start: 2025-07-07

## 2025-07-07 RX ORDER — DIPHENHYDRAMINE HCL 25 MG
25 CAPSULE ORAL ONCE
Start: 2025-08-28 | End: 2025-08-28

## 2025-07-07 RX ORDER — ACETAMINOPHEN 325 MG/1
650 TABLET ORAL ONCE
Start: 2025-08-28 | End: 2025-08-28

## 2025-07-07 RX ADMIN — INFLIXIMAB 900 MG: 100 INJECTION, POWDER, LYOPHILIZED, FOR SOLUTION INTRAVENOUS at 11:34

## 2025-07-07 ASSESSMENT — PAIN SCALES - GENERAL: PAINLEVEL_OUTOF10: NO PAIN (0)

## 2025-07-07 NOTE — TELEPHONE ENCOUNTER
Reviewed with dr terry. Pt to decrease prednisone 13 mg daily to 12 mg daily and repeat next week. Pt reminded to do labs this week. Pt to do PETH test.

## 2025-07-07 NOTE — PROGRESS NOTES
Infusion Nursing Note:  Abiola Matute presents today for remicade.    Patient seen by provider today: No   present during visit today: Not Applicable.    Note: pt denies changes in health or new concerns.      Intravenous Access:  Peripheral IV placed.    Treatment Conditions:  Biological Infusion Checklist:  ~~~ NOTE: If the patient answers yes to any of the questions below, hold the infusion and contact ordering provider or on-call provider.    Have you recently had an elevated temperature, fever, chills, productive cough, coughing for 3 weeks or longer or hemoptysis,  abnormal vital signs, night sweats,  chest pain or have you noticed a decrease in your appetite, unexplained weight loss or fatigue? No  Do you have any open wounds or new incisions? No  Do you have any upcoming hospitalizations or surgeries? Does not include esophagogastroduodenoscopy, colonoscopy, endoscopic retrograde cholangiopancreatography (ERCP), endoscopic ultrasound (EUS), dental procedures or joint aspiration/steroid injections No  Do you currently have any signs of illness or infection or are you on any antibiotics? No  Have you had any new, sudden or worsening abdominal pain? No  Have you or anyone in your household received a live vaccination in the past 4 weeks? Please note: No live vaccines while on biologic/chemotherapy until 6 months after the last treatment. Patient can receive the flu vaccine (shot only), pneumovax and the Covid vaccine. It is optimal for the patient to get these vaccines mid cycle, but they can be given at any time as long as it is not on the day of the infusion. No  Have you recently been diagnosed with any new nervous system diseases (ie. Multiple sclerosis, Guillain [place], seizures, neurological changes) or cancer diagnosis? Are you on any form of radiation or chemotherapy? No  Are you pregnant or breast feeding or do you have plans of pregnancy in the future? No  Have you been having any signs  of worsening depression or suicidal ideations?  (benlysta only) No  Have there been any other new onset medical symptoms? No  Have you had any new blood clots? (IVIG only) No      Post Infusion Assessment:  Patient tolerated infusion without incident.  Site patent and intact, free from redness, edema or discomfort.  No evidence of extravasations.  Access discontinued per protocol.       Discharge Plan:   Patient and/or family verbalized understanding of discharge instructions and all questions answered.  AVS to patient via [facility].  Patient will return  for next appointment.   Patient discharged in stable condition accompanied by: self.  Departure Mode: Ambulatory.      Cori Garcia RN

## 2025-07-10 ENCOUNTER — LAB (OUTPATIENT)
Dept: LAB | Facility: CLINIC | Age: 60
End: 2025-07-10
Payer: MEDICARE

## 2025-07-10 ENCOUNTER — TELEPHONE (OUTPATIENT)
Dept: TRANSPLANT | Facility: CLINIC | Age: 60
End: 2025-07-10

## 2025-07-10 DIAGNOSIS — Z13.220 LIPID SCREENING: ICD-10-CM

## 2025-07-10 DIAGNOSIS — Z94.4 LIVER REPLACED BY TRANSPLANT (H): ICD-10-CM

## 2025-07-10 DIAGNOSIS — F10.90 ALCOHOL USE DISORDER: ICD-10-CM

## 2025-07-10 DIAGNOSIS — T86.41 LIVER TRANSPLANT REJECTION (H): Primary | ICD-10-CM

## 2025-07-10 DIAGNOSIS — F55.8 ABUSE OF OTHER NON-PSYCHOACTIVE SUBSTANCES: ICD-10-CM

## 2025-07-10 DIAGNOSIS — T86.41 LIVER TRANSPLANT REJECTION (H): ICD-10-CM

## 2025-07-10 LAB
ALBUMIN MFR UR ELPH: 6.7 MG/DL
ALBUMIN SERPL BCG-MCNC: 4.3 G/DL (ref 3.5–5.2)
ALBUMIN UR-MCNC: NEGATIVE MG/DL
ALP SERPL-CCNC: 103 U/L (ref 40–150)
ALT SERPL W P-5'-P-CCNC: 37 U/L (ref 0–50)
ANION GAP SERPL CALCULATED.3IONS-SCNC: 10 MMOL/L (ref 7–15)
APPEARANCE UR: CLEAR
AST SERPL W P-5'-P-CCNC: 24 U/L (ref 0–45)
BILIRUB DIRECT SERPL-MCNC: 0.28 MG/DL (ref 0–0.3)
BILIRUB SERPL-MCNC: 1.1 MG/DL
BILIRUB UR QL STRIP: NEGATIVE
BUN SERPL-MCNC: 30.3 MG/DL (ref 8–23)
CALCIUM SERPL-MCNC: 9.5 MG/DL (ref 8.8–10.4)
CHLORIDE SERPL-SCNC: 102 MMOL/L (ref 98–107)
CHOLEST SERPL-MCNC: 325 MG/DL
COLOR UR AUTO: NORMAL
CREAT SERPL-MCNC: 0.9 MG/DL (ref 0.51–0.95)
CREAT UR-MCNC: 82.4 MG/DL
CYCLOSPORINE BLD LC/MS/MS-MCNC: 165 UG/L (ref 50–400)
EGFRCR SERPLBLD CKD-EPI 2021: 72 ML/MIN/1.73M2
ERYTHROCYTE [DISTWIDTH] IN BLOOD BY AUTOMATED COUNT: 14 % (ref 10–15)
FASTING STATUS PATIENT QL REPORTED: YES
GLUCOSE SERPL-MCNC: 103 MG/DL (ref 70–99)
GLUCOSE UR STRIP-MCNC: NEGATIVE MG/DL
HBV DNA SERPL NAA+PROBE-ACNC: NOT DETECTED IU/ML
HCO3 SERPL-SCNC: 26 MMOL/L (ref 22–29)
HCT VFR BLD AUTO: 37.9 % (ref 35–47)
HDLC SERPL-MCNC: 109 MG/DL
HGB BLD-MCNC: 12.7 G/DL (ref 11.7–15.7)
HGB UR QL STRIP: NEGATIVE
KETONES UR STRIP-MCNC: NEGATIVE MG/DL
LDLC SERPL CALC-MCNC: 200 MG/DL
LEUKOCYTE ESTERASE UR QL STRIP: NEGATIVE
MAGNESIUM SERPL-MCNC: 2 MG/DL (ref 1.7–2.3)
MCH RBC QN AUTO: 29.7 PG (ref 26.5–33)
MCHC RBC AUTO-ENTMCNC: 33.5 G/DL (ref 31.5–36.5)
MCV RBC AUTO: 89 FL (ref 78–100)
NITRATE UR QL: NEGATIVE
NONHDLC SERPL-MCNC: 216 MG/DL
PH UR STRIP: 6.5 [PH] (ref 5–7)
PHOSPHATE SERPL-MCNC: 4 MG/DL (ref 2.5–4.5)
PLATELET # BLD AUTO: 253 10E3/UL (ref 150–450)
POTASSIUM SERPL-SCNC: 4.6 MMOL/L (ref 3.4–5.3)
PROT SERPL-MCNC: 7.2 G/DL (ref 6.4–8.3)
PROT/CREAT 24H UR: 0.08 MG/MG CR (ref 0–0.2)
RBC # BLD AUTO: 4.27 10E6/UL (ref 3.8–5.2)
SODIUM SERPL-SCNC: 138 MMOL/L (ref 135–145)
SP GR UR STRIP: 1.02 (ref 1–1.03)
TME LAST DOSE: NORMAL H
TME LAST DOSE: NORMAL H
TRIGL SERPL-MCNC: 80 MG/DL
UROBILINOGEN UR STRIP-MCNC: NORMAL MG/DL
WBC # BLD AUTO: 7.8 10E3/UL (ref 4–11)

## 2025-07-10 PROCEDURE — 84156 ASSAY OF PROTEIN URINE: CPT

## 2025-07-10 PROCEDURE — 83735 ASSAY OF MAGNESIUM: CPT

## 2025-07-10 PROCEDURE — 81003 URINALYSIS AUTO W/O SCOPE: CPT

## 2025-07-10 PROCEDURE — 84100 ASSAY OF PHOSPHORUS: CPT

## 2025-07-10 PROCEDURE — 36415 COLL VENOUS BLD VENIPUNCTURE: CPT

## 2025-07-10 PROCEDURE — 85018 HEMOGLOBIN: CPT

## 2025-07-10 PROCEDURE — 84520 ASSAY OF UREA NITROGEN: CPT

## 2025-07-10 PROCEDURE — 80307 DRUG TEST PRSMV CHEM ANLYZR: CPT

## 2025-07-10 PROCEDURE — 80321 ALCOHOLS BIOMARKERS 1OR 2: CPT

## 2025-07-10 PROCEDURE — 82465 ASSAY BLD/SERUM CHOLESTEROL: CPT

## 2025-07-10 PROCEDURE — 87517 HEPATITIS B DNA QUANT: CPT

## 2025-07-10 PROCEDURE — 84450 TRANSFERASE (AST) (SGOT): CPT

## 2025-07-10 PROCEDURE — 80158 DRUG ASSAY CYCLOSPORINE: CPT

## 2025-07-10 NOTE — TELEPHONE ENCOUNTER
Abiola asking about name of dietician as she has gained weight.  She attributes this to being on prednisone.  She did make an appt discuss this w/ her PCP.  She received IV steroids for rejection.  Encouraged activity.

## 2025-07-11 ENCOUNTER — TELEPHONE (OUTPATIENT)
Dept: NEUROLOGY | Facility: CLINIC | Age: 60
End: 2025-07-11
Payer: MEDICARE

## 2025-07-11 DIAGNOSIS — R56.9 SEIZURE (H): ICD-10-CM

## 2025-07-11 RX ORDER — LEVETIRACETAM 1000 MG/1
1000 TABLET ORAL 3 TIMES DAILY
Qty: 180 TABLET | Refills: 3 | Status: CANCELLED | OUTPATIENT
Start: 2025-07-11

## 2025-07-11 NOTE — TELEPHONE ENCOUNTER
Refill request:    Which pharmacy does pt go to?  [facility] - [facility], [place] - [facility]    Which medication is pt calling about?  Leviteracetam 1000 mg  Reference number [id number]    How much medication does pt have left?  Unknown    Does pt have refills?  NO    Does pt need the refill within 24 hours?  NO    Is medication a controlled substance?  NO    Is pt scheduled for provider follow-up visit?  YES, Appt Date: 7/14/25

## 2025-07-14 ENCOUNTER — VIRTUAL VISIT (OUTPATIENT)
Dept: NEUROLOGY | Facility: CLINIC | Age: 60
End: 2025-07-14
Payer: MEDICARE

## 2025-07-14 DIAGNOSIS — R56.9 SEIZURE (H): ICD-10-CM

## 2025-07-14 PROCEDURE — G2211 COMPLEX E/M VISIT ADD ON: HCPCS | Mod: 95 | Performed by: INTERNAL MEDICINE

## 2025-07-14 PROCEDURE — 1126F AMNT PAIN NOTED NONE PRSNT: CPT | Mod: 95 | Performed by: INTERNAL MEDICINE

## 2025-07-14 PROCEDURE — 98005 SYNCH AUDIO-VIDEO EST LOW 20: CPT | Performed by: INTERNAL MEDICINE

## 2025-07-14 RX ORDER — LEVETIRACETAM 750 MG/1
750 TABLET ORAL 2 TIMES DAILY
Qty: 180 TABLET | Refills: 3 | Status: SHIPPED | OUTPATIENT
Start: 2025-07-14

## 2025-07-14 NOTE — LETTER
7/14/2025       RE: Abiola Matute  [address] [facility] [address] [facility] [address]     Dear Colleague,    Thank you for referring your patient, Abiola Matute, to the [facility] [place] [facility]. Please see a copy of my visit note below.    Virtual Visit Details    Type of service:  Video Visit   Video Start Time: 08:35  Video End Time:9:10 AM    Originating Location (pt. Location): Home    Distant Location (provider location):  Off-site  Platform used for Video Visit: Suma  Today, after several attempts to use the video platform, we resolved to discuss them via phone as a video platform was not working.    INTERVAL HISTORY  She is here with  who provides collateral history.  Reports that things have been going well since the levetiracetam wean.  She reports no seizures or concerns for seizures.  They do reports that she continues to have brain fog although it has reduced in frequency and severity since the afternoon wean.  They wondered about further weaning and discontinuation of the medication.  I again reiterated that given the severity and frequency of her seizures constituting focal status epilepticus, I would only be comfortable weaning off medication after 2 years of being seizure-free.  Given that she continues to have brain fog, we may consider reducing the medication dose from 1000 mg twice a day to 750 mg twice a day especially given that she had tolerated the prior wean so well.  They were agreeable with this.  In about a year following obtaining a sleep deprived routine EEG we will consider a slow wean and obtain a 24-hour ambulatory EEG several weeks after completing a wean.  They were agreeable to this.      Epilepsy Data:  Ms Matute is a 59 year old woman with a medical history significant crohn's disease on infliximab and cirrhosis c/b HCC s/p recent liver transplant on 6/22/2024.   Her first seizure was in  July 9th, witnessed by her . Prior to that she underwent a liver transplant on June 22, which was complicated by septic shock, requiring ICU admission. She was subsequently discharged on July 6. Her  observed that she initially made unusual movements with her right arm, then her left arm, and turned her head to the right before experiencing a generalized tonic-clonic convulsion. Following this event, she returned to the hospital, where she reportedly had a similar seizure aborted by lorazepam. An MRI brain showed trace subdural hemorrhage overlying the right occipital lobe without mass effect. There was mild pachymeningeal enhancement over the right convexity, suggesting a reactive change. Given her immunocompromised state and a history of fever one week prior with new-onset seizures, a lumbar puncture (LP) was recommended and showed no evidence of infection. The cerebrospinal fluid (CSF) analysis showed 0 WBC, protein 49, glucose 68, and was negative for HSV 1 and 2 and the meningitis panel. A CT scan of the head did not show progression of the trace subdural hemorrhage in the right occipital lobe. She was discharged with maintenance Keppra (levetiracetam) 750 mg twice daily.  The following day, her  witnessed several episodes characterized by staring and an inability to speak. She reportedly returned to her baseline state between the seizures and was aware that something unusual had occurred. She could hear her  but was unable to respond.  She was readmitted, and the levetiracetam dose was increased to 1000 mg twice daily. She underwent continuous EEG monitoring from July 12 to July 15, during which several electroclinical and electrographic seizures were captured. Despite attempts to use IV benzodiazepines and increase levetiracetam, seizure onset was poorly defined, showing bilateral activity in the central head region, more prominent on the right. Immunosuppression management was  adjusted, and tacrolimus was switched to cyclosporine at discharge.  Since her discharge, she has had no episodes of concerning symptoms such as seizures, staring spells, automatisms, or loss of consciousness. She reports that she is mostly doing well on levetiracetam, although she feels somewhat less alert and slower in her responses. Despite these side effects, she wishes to continue with the current medication as she is able to cope with them.  She has no concerning , , or immediate  issues from her birth. 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 She denies any history of head trauma, strokes, brain malignancy, or a family history of epilepsy.           SPELL 1 - Staring, aware of her surroudings but couldn't follow commands. No automatisms  Duration: 7- 10  minutes. Frequent episodes from  -   SPELL 2 - right arm movement, right head turn and GTC < 5 minutes. 2 episodes     ASM - Levetiracetam, SE - brain fogginess     PRIOR WORK-UP  CSF panel - CSF 0 WBC, protein 49, glucose 68, HSV 1 and 2 and meningitis panel negative.  MRI brain 07/10:    1.  No recent infarct or intracranial mass.  2.  Trace subdural hemorrhage overlying the right occipital lobe without mass effect. There is mild pachymeningeal enhancement overlying the right convexity favored to reflect a reactive change.  3.  Nonspecific symmetric signal changes involving the corticospinal tracts which can be seen in the setting of chronic hepatic encephalopathy.     cEEG monitoring  - 7/15  Activity normal during 7 structures of the recording.  Background activity consistent with mild to moderate diffuse encephalopathy during other portions of the recording.  Multiple electroclinical seizures were noted during first 2 days of recording.  Clinically patient appeared less responsive to commands, took longer to answer simple questions such as her name, and had difficulty remembering items.   Her  reported her to be confused and routinely pressed event marker.  EEG during these revealed rhythmic delta which gave way to rhythmic sharp waves which gradually slowed and became more periodic.  Discharge was seen in the central regions and bilaterally but was often more persistent on the right.  Electrographic seizures lasted as long as 15 minutes.  Seizures appear to stop after lorazepam 2 mg was administered on the evening of the second day of recording, and levetiracetam was increased to 2000 mg/day.  No further seizures were seen in the last 2 days of recording.      PHYSICAL EXAMINATION  This was limited by the virtual visit.  The patient did move all limbs spontaneously and could move her eyes in all of the visual fields.        Impression  Focal seizures with impaired awareness in the context of Liver transplant  Focal status epilepticus  Acute repetitive seizures  Ms. Matute is a 60-year-old woman with a history of acute repetitive seizures and focal status epilepticus in the context of a recent liver transplant. She was initially hospitalized for two witnessed generalized tonic-clonic seizures, for which levetiracetam was started. Following this, she experienced acute repetitive focal seizures and focal status epilepticus, which were captured on continuous EEG monitoring. These episodes abated with an increase in levetiracetam dosage from 750 mg twice daily to 1000 mg three times daily, as well as a switch in immunosuppressive agents from tacrolimus to cyclosporine.  She is currently doing very well on levetiracetam 1000 mg twice daily with no significant side effects, although she reports feeling cognitively slower and having less of a filter. he mentions that many of her antirejection drugs are currently being weaned, and she would like to stop taking levetiracetam as well. She reports feeling very well following the discontinuation of tacrolimus and is reassured that it likely precipitated  the seizures.    I again reiterated that given the severity and frequency of her seizures constituting focal status epilepticus, I would only be comfortable weaning off medication after 2 years of being seizure-free.  Given that she continues to have brain fog, we may consider reducing the medication dose from 1000 mg twice a day to 750 mg twice a day especially given that she had tolerated the prior wean so well.  They were agreeable with this.  In about a year following obtaining a sleep deprived routine EEG we will consider a slow wean and obtain a 24-hour ambulatory EEG several weeks after completing a wean.  They were agreeable to this.I also discussed the importance of avoiding any activities, especially driving, as losing consciousness could result in severe bodily injury or death. She is in agreement with this as well.        PLAN  Decrease levetiracetam dose to 750 mg twice daily  Levetiracetam levels in 3 months   Abortive medications - Intranasal diazepam 7.5 mg/ml 1 squirt in each nostril.  To be used for GTC greater than 3 to 4 minutes, focal seizures [smaller seizures] greater than 10 minutes, 3 or more GTC or focal seizures within 24 hours  Routine sleep deprived EEG before 1 year visit    RTC in 9 - 12 months        I spent 26  minutes in total today to provide comprehensive  medical care.    The longitudinal plan of care for the diagnosis(es)/condition(s) as documented were addressed during this visit. Due to the added complexity in care, I will continue to support [place] in the subsequent management and with ongoing continuity of care.     The rest of the time was spent with the patient in face to face interview. During this time key medical decisions were made with review of medical chart prior to visit, visit with patient, counseling/education, and post visit work, including documentation of note on the day of visit. I also personally reviewed prior investigations - laboratory and imaging related  to the patients epilepsy care.  I addressed all questions the patient/caregiver raised in regards to epilepsy or related medical questions.     Again, thank you for allowing me to participate in the care of your patient.      Sincerely,    Soo Lugo MD

## 2025-07-14 NOTE — PROGRESS NOTES
Virtual Visit Details    Type of service:  Video Visit   Video Start Time: 08:35  Video End Time:9:10 AM    Originating Location (pt. Location): Home    Distant Location (provider location):  Off-site  Platform used for Video Visit: Suma  Today, after several attempts to use the video platform, we resolved to discuss them via phone as a video platform was not working.    INTERVAL HISTORY  She is here with  who provides collateral history.  Reports that things have been going well since the levetiracetam wean.  She reports no seizures or concerns for seizures.  They do reports that she continues to have brain fog although it has reduced in frequency and severity since the afternoon wean.  They wondered about further weaning and discontinuation of the medication.  I again reiterated that given the severity and frequency of her seizures constituting focal status epilepticus, I would only be comfortable weaning off medication after 2 years of being seizure-free.  Given that she continues to have brain fog, we may consider reducing the medication dose from 1000 mg twice a day to 750 mg twice a day especially given that she had tolerated the prior wean so well.  They were agreeable with this.  In about a year following obtaining a sleep deprived routine EEG we will consider a slow wean and obtain a 24-hour ambulatory EEG several weeks after completing a wean.  They were agreeable to this.      Epilepsy Data:  Ms Matute is a 59 year old woman with a medical history significant crohn's disease on infliximab and cirrhosis c/b HCC s/p recent liver transplant on 6/22/2024.   Her first seizure was in July 9th, witnessed by her . Prior to that she underwent a liver transplant on June 22, which was complicated by septic shock, requiring ICU admission. She was subsequently discharged on July 6. Her  observed that she initially made unusual movements with her right arm, then her left arm, and turned her head  to the right before experiencing a generalized tonic-clonic convulsion. Following this event, she returned to the hospital, where she reportedly had a similar seizure aborted by lorazepam. An MRI brain showed trace subdural hemorrhage overlying the right occipital lobe without mass effect. There was mild pachymeningeal enhancement over the right convexity, suggesting a reactive change. Given her immunocompromised state and a history of fever one week prior with new-onset seizures, a lumbar puncture (LP) was recommended and showed no evidence of infection. The cerebrospinal fluid (CSF) analysis showed 0 WBC, protein 49, glucose 68, and was negative for HSV 1 and 2 and the meningitis panel. A CT scan of the head did not show progression of the trace subdural hemorrhage in the right occipital lobe. She was discharged with maintenance Keppra (levetiracetam) 750 mg twice daily.  The following day, her  witnessed several episodes characterized by staring and an inability to speak. She reportedly returned to her baseline state between the seizures and was aware that something unusual had occurred. She could hear her  but was unable to respond.  She was readmitted, and the levetiracetam dose was increased to 1000 mg twice daily. She underwent continuous EEG monitoring from July 12 to July 15, during which several electroclinical and electrographic seizures were captured. Despite attempts to use IV benzodiazepines and increase levetiracetam, seizure onset was poorly defined, showing bilateral activity in the central head region, more prominent on the right. Immunosuppression management was adjusted, and tacrolimus was switched to cyclosporine at discharge.  Since her discharge, she has had no episodes of concerning symptoms such as seizures, staring spells, automatisms, or loss of consciousness. She reports that she is mostly doing well on levetiracetam, although she feels somewhat less alert and slower in her  responses. Despite these side effects, she wishes to continue with the current medication as she is able to cope with them.  She has no concerning , , or immediate  issues from her birth. 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 She denies any history of head trauma, strokes, brain malignancy, or a family history of epilepsy.           SPELL 1 - Staring, aware of her surroudings but couldn't follow commands. No automatisms  Duration: 7- 10  minutes. Frequent episodes from  -   SPELL 2 - right arm movement, right head turn and GTC < 5 minutes. 2 episodes     ASM - Levetiracetam, SE - brain fogginess     PRIOR WORK-UP  CSF panel - CSF 0 WBC, protein 49, glucose 68, HSV 1 and 2 and meningitis panel negative.  MRI brain 07/10:    1.  No recent infarct or intracranial mass.  2.  Trace subdural hemorrhage overlying the right occipital lobe without mass effect. There is mild pachymeningeal enhancement overlying the right convexity favored to reflect a reactive change.  3.  Nonspecific symmetric signal changes involving the corticospinal tracts which can be seen in the setting of chronic hepatic encephalopathy.     cEEG monitoring  - 7/15  Activity normal during 7 structures of the recording.  Background activity consistent with mild to moderate diffuse encephalopathy during other portions of the recording.  Multiple electroclinical seizures were noted during first 2 days of recording.  Clinically patient appeared less responsive to commands, took longer to answer simple questions such as her name, and had difficulty remembering items.  Her  reported her to be confused and routinely pressed event marker.  EEG during these revealed rhythmic delta which gave way to rhythmic sharp waves which gradually slowed and became more periodic.  Discharge was seen in the central regions and bilaterally but was often more persistent on the right.  Electrographic  seizures lasted as long as 15 minutes.  Seizures appear to stop after lorazepam 2 mg was administered on the evening of the second day of recording, and levetiracetam was increased to 2000 mg/day.  No further seizures were seen in the last 2 days of recording.      PHYSICAL EXAMINATION  This was limited by the virtual visit.  The patient did move all limbs spontaneously and could move her eyes in all of the visual fields.        Impression  Focal seizures with impaired awareness in the context of Liver transplant  Focal status epilepticus  Acute repetitive seizures  Ms. Matute is a 60-year-old woman with a history of acute repetitive seizures and focal status epilepticus in the context of a recent liver transplant. She was initially hospitalized for two witnessed generalized tonic-clonic seizures, for which levetiracetam was started. Following this, she experienced acute repetitive focal seizures and focal status epilepticus, which were captured on continuous EEG monitoring. These episodes abated with an increase in levetiracetam dosage from 750 mg twice daily to 1000 mg three times daily, as well as a switch in immunosuppressive agents from tacrolimus to cyclosporine.  She is currently doing very well on levetiracetam 1000 mg twice daily with no significant side effects, although she reports feeling cognitively slower and having less of a filter. he mentions that many of her antirejection drugs are currently being weaned, and she would like to stop taking levetiracetam as well. She reports feeling very well following the discontinuation of tacrolimus and is reassured that it likely precipitated the seizures.    I again reiterated that given the severity and frequency of her seizures constituting focal status epilepticus, I would only be comfortable weaning off medication after 2 years of being seizure-free.  Given that she continues to have brain fog, we may consider reducing the medication dose from 1000 mg twice  a day to 750 mg twice a day especially given that she had tolerated the prior wean so well.  They were agreeable with this.  In about a year following obtaining a sleep deprived routine EEG we will consider a slow wean and obtain a 24-hour ambulatory EEG several weeks after completing a wean.  They were agreeable to this.I also discussed the importance of avoiding any activities, especially driving, as losing consciousness could result in severe bodily injury or death. She is in agreement with this as well.        PLAN  Decrease levetiracetam dose to 750 mg twice daily  Levetiracetam levels in 3 months   Abortive medications - Intranasal diazepam 7.5 mg/ml 1 squirt in each nostril.  To be used for GTC greater than 3 to 4 minutes, focal seizures [smaller seizures] greater than 10 minutes, 3 or more GTC or focal seizures within 24 hours  Routine sleep deprived EEG before 1 year visit    RTC in 9 - 12 months        I spent 26  minutes in total today to provide comprehensive  medical care.    The longitudinal plan of care for the diagnosis(es)/condition(s) as documented were addressed during this visit. Due to the added complexity in care, I will continue to support [place] in the subsequent management and with ongoing continuity of care.     The rest of the time was spent with the patient in face to face interview. During this time key medical decisions were made with review of medical chart prior to visit, visit with patient, counseling/education, and post visit work, including documentation of note on the day of visit. I also personally reviewed prior investigations - laboratory and imaging related to the patients epilepsy care.  I addressed all questions the patient/caregiver raised in regards to epilepsy or related medical questions.

## 2025-07-14 NOTE — NURSING NOTE
Current patient location: [address] [place] [id number]    Is the patient currently in the state of [place]? YES    Visit mode: VIDEO    If the visit is dropped, the patient can be reconnected by:TELEPHONE VISIT: Phone number:   Telephone Information:   Mobile [phone]       Will anyone else be joining the visit? NO  (If patient encounters technical issues they should call [phone]    Are changes needed to the allergy or medication list? Pt stated no med changes    Are refills needed on medications prescribed by this physician? NO    Rooming Documentation:  Questionnaire(s) completed    Reason for visit: LORETO HEARN

## 2025-07-15 ENCOUNTER — TELEPHONE (OUTPATIENT)
Dept: TRANSPLANT | Facility: CLINIC | Age: 60
End: 2025-07-15
Payer: MEDICARE

## 2025-07-15 DIAGNOSIS — R63.5 WEIGHT GAIN: ICD-10-CM

## 2025-07-15 DIAGNOSIS — Z94.4 LIVER REPLACED BY TRANSPLANT (H): ICD-10-CM

## 2025-07-15 DIAGNOSIS — T86.41 LIVER TRANSPLANT REJECTION (H): Primary | ICD-10-CM

## 2025-07-15 NOTE — TELEPHONE ENCOUNTER
Pt asked for dietician appt due to weight gain.    Dietician can review healthy eating habits.   Pt is on prednisone.

## 2025-07-15 NOTE — TELEPHONE ENCOUNTER
Per chart review, [facility] called with questions regarding patient's Keppra prescription. (sent 7-14)    RN returned call to [facility] and spoke with a pharmacy technician. RN provided patient information to pharmacy technician. Upon looking patient up in their system, the pharmacy technician informed RN that the issue with the Keppra prescription was resolved and they are working on filling the prescription now.    Stacy Stein RN, BSN  [facility]         32

## 2025-07-15 NOTE — TELEPHONE ENCOUNTER
[facility] Call Center    Phone Message    May a detailed message be left on voicemail: yes     Reason for Call: Other: Outbound called made to schedule appt. Patient would like a call back to discuss if appt is needed and necessary. The weight gain could be due the prednisone. Please reach out to patient to discuss.      Action Taken: Message routed to:  [facility] TOMY SOT    Travel Screening: Not Applicable     Date of Service:

## 2025-07-15 NOTE — TELEPHONE ENCOUNTER
[facility] Call Center    Phone Message    May a detailed message be left on voicemail: no     Reason for Call: Other: Pharmacy is requesting additional information regarding recent prescription for levETIRAcetam (KEPPRA) 750 MG tablet.     [facility] to further advise    Action Taken: Other: Neurology    Travel Screening: Not Applicable

## 2025-07-17 ENCOUNTER — TELEPHONE (OUTPATIENT)
Dept: TRANSPLANT | Facility: CLINIC | Age: 60
End: 2025-07-17

## 2025-07-17 ENCOUNTER — LAB (OUTPATIENT)
Dept: LAB | Facility: CLINIC | Age: 60
End: 2025-07-17
Payer: MEDICARE

## 2025-07-17 DIAGNOSIS — T86.41 LIVER TRANSPLANT REJECTION (H): ICD-10-CM

## 2025-07-17 DIAGNOSIS — R56.9 SEIZURE (H): ICD-10-CM

## 2025-07-17 DIAGNOSIS — T86.41 LIVER TRANSPLANT REJECTION (H): Primary | ICD-10-CM

## 2025-07-17 LAB
ALBUMIN SERPL BCG-MCNC: 4.2 G/DL (ref 3.5–5.2)
ALP SERPL-CCNC: 98 U/L (ref 40–150)
ALT SERPL W P-5'-P-CCNC: 31 U/L (ref 0–50)
ANION GAP SERPL CALCULATED.3IONS-SCNC: 11 MMOL/L (ref 7–15)
AST SERPL W P-5'-P-CCNC: 24 U/L (ref 0–45)
BILIRUB DIRECT SERPL-MCNC: 0.28 MG/DL (ref 0–0.3)
BILIRUB SERPL-MCNC: 1.2 MG/DL
BUN SERPL-MCNC: 31.1 MG/DL (ref 8–23)
CALCIUM SERPL-MCNC: 9.2 MG/DL (ref 8.8–10.4)
CHLORIDE SERPL-SCNC: 105 MMOL/L (ref 98–107)
CREAT SERPL-MCNC: 0.91 MG/DL (ref 0.51–0.95)
CYCLOSPORINE BLD LC/MS/MS-MCNC: 170 UG/L (ref 50–400)
EGFRCR SERPLBLD CKD-EPI 2021: 71 ML/MIN/1.73M2
ERYTHROCYTE [DISTWIDTH] IN BLOOD BY AUTOMATED COUNT: 13.7 % (ref 10–15)
GLUCOSE SERPL-MCNC: 85 MG/DL (ref 70–99)
HCO3 SERPL-SCNC: 24 MMOL/L (ref 22–29)
HCT VFR BLD AUTO: 35.4 % (ref 35–47)
HGB BLD-MCNC: 11.8 G/DL (ref 11.7–15.7)
LEVETIRACETAM SERPL-MCNC: 25 ÂΜG/ML (ref 10–40)
MAGNESIUM SERPL-MCNC: 2.1 MG/DL (ref 1.7–2.3)
MCH RBC QN AUTO: 29.9 PG (ref 26.5–33)
MCHC RBC AUTO-ENTMCNC: 33.3 G/DL (ref 31.5–36.5)
MCV RBC AUTO: 90 FL (ref 78–100)
PHOSPHATE SERPL-MCNC: 3.9 MG/DL (ref 2.5–4.5)
PLATELET # BLD AUTO: 225 10E3/UL (ref 150–450)
POTASSIUM SERPL-SCNC: 4.1 MMOL/L (ref 3.4–5.3)
PROT SERPL-MCNC: 6.7 G/DL (ref 6.4–8.3)
RBC # BLD AUTO: 3.94 10E6/UL (ref 3.8–5.2)
SODIUM SERPL-SCNC: 140 MMOL/L (ref 135–145)
TME LAST DOSE: NORMAL H
TME LAST DOSE: NORMAL H
WBC # BLD AUTO: 6.2 10E3/UL (ref 4–11)

## 2025-07-17 PROCEDURE — 85014 HEMATOCRIT: CPT

## 2025-07-17 PROCEDURE — 80177 DRUG SCRN QUAN LEVETIRACETAM: CPT

## 2025-07-17 PROCEDURE — 82248 BILIRUBIN DIRECT: CPT

## 2025-07-17 PROCEDURE — 83735 ASSAY OF MAGNESIUM: CPT

## 2025-07-17 PROCEDURE — 80158 DRUG ASSAY CYCLOSPORINE: CPT

## 2025-07-17 PROCEDURE — 36415 COLL VENOUS BLD VENIPUNCTURE: CPT

## 2025-07-17 PROCEDURE — 82947 ASSAY GLUCOSE BLOOD QUANT: CPT

## 2025-07-17 PROCEDURE — 84100 ASSAY OF PHOSPHORUS: CPT

## 2025-07-17 NOTE — TELEPHONE ENCOUNTER
"Pt calls to ask why she is getting a lab letter in the mail when all of her information is in her chart. \"What am I supposed to do with this\". Explained that it is our protocol that pt's receive an updated lab letter yearly to use if going to a facility other than [facility] and also as a reminder to get labs completed as outlined. Per pt: So in other words,  I can just throw this as it means nothing to me? Suggested filing it or whatever pt feels is appropriate. FYI.   "

## 2025-07-18 ENCOUNTER — TELEPHONE (OUTPATIENT)
Dept: TRANSPLANT | Facility: CLINIC | Age: 60
End: 2025-07-18
Payer: MEDICARE

## 2025-07-18 DIAGNOSIS — T86.41 LIVER TRANSPLANT REJECTION (H): Primary | ICD-10-CM

## 2025-07-18 DIAGNOSIS — Z94.4 S/P LIVER TRANSPLANT (H): ICD-10-CM

## 2025-07-22 ENCOUNTER — OFFICE VISIT (OUTPATIENT)
Dept: FAMILY MEDICINE | Facility: CLINIC | Age: 60
End: 2025-07-22
Payer: MEDICARE

## 2025-07-22 VITALS
WEIGHT: 198 LBS | TEMPERATURE: 97.6 F | HEART RATE: 89 BPM | SYSTOLIC BLOOD PRESSURE: 128 MMHG | OXYGEN SATURATION: 98 % | DIASTOLIC BLOOD PRESSURE: 82 MMHG | RESPIRATION RATE: 16 BRPM | BODY MASS INDEX: 32.99 KG/M2 | HEIGHT: 65 IN

## 2025-07-22 DIAGNOSIS — E78.5 HYPERLIPIDEMIA, UNSPECIFIED HYPERLIPIDEMIA TYPE: Primary | ICD-10-CM

## 2025-07-22 DIAGNOSIS — E66.811 CLASS 1 OBESITY WITH SERIOUS COMORBIDITY AND BODY MASS INDEX (BMI) OF 32.0 TO 32.9 IN ADULT, UNSPECIFIED OBESITY TYPE: ICD-10-CM

## 2025-07-22 PROCEDURE — 3074F SYST BP LT 130 MM HG: CPT | Performed by: NURSE PRACTITIONER

## 2025-07-22 PROCEDURE — 80061 LIPID PANEL: CPT | Performed by: NURSE PRACTITIONER

## 2025-07-22 PROCEDURE — 99214 OFFICE O/P EST MOD 30 MIN: CPT | Performed by: NURSE PRACTITIONER

## 2025-07-22 PROCEDURE — 36415 COLL VENOUS BLD VENIPUNCTURE: CPT | Performed by: NURSE PRACTITIONER

## 2025-07-22 PROCEDURE — G2211 COMPLEX E/M VISIT ADD ON: HCPCS | Performed by: NURSE PRACTITIONER

## 2025-07-22 PROCEDURE — 3079F DIAST BP 80-89 MM HG: CPT | Performed by: NURSE PRACTITIONER

## 2025-07-22 RX ORDER — CYCLOSPORINE 100 MG/1
100 CAPSULE, LIQUID FILLED ORAL EVERY 12 HOURS
Qty: 180 CAPSULE | Refills: 3 | Status: SHIPPED | OUTPATIENT
Start: 2025-07-22

## 2025-07-22 RX ORDER — CYCLOSPORINE 25 MG/1
25 CAPSULE, LIQUID FILLED ORAL DAILY PRN
Qty: 360 CAPSULE | Refills: 3 | Status: SHIPPED | OUTPATIENT
Start: 2025-07-22

## 2025-07-22 RX ORDER — LEVETIRACETAM 750 MG/1
750 TABLET ORAL 2 TIMES DAILY
COMMUNITY
Start: 2025-07-22

## 2025-07-22 RX ORDER — BUPROPION HYDROCHLORIDE 150 MG/1
150 TABLET ORAL EVERY MORNING
Qty: 30 TABLET | Refills: 3 | Status: SHIPPED | OUTPATIENT
Start: 2025-07-22

## 2025-07-22 ASSESSMENT — ANXIETY QUESTIONNAIRES
GAD7 TOTAL SCORE: 2
1. FEELING NERVOUS, ANXIOUS, OR ON EDGE: NOT AT ALL
3. WORRYING TOO MUCH ABOUT DIFFERENT THINGS: SEVERAL DAYS
IF YOU CHECKED OFF ANY PROBLEMS ON THIS QUESTIONNAIRE, HOW DIFFICULT HAVE THESE PROBLEMS MADE IT FOR YOU TO DO YOUR WORK, TAKE CARE OF THINGS AT HOME, OR GET ALONG WITH OTHER PEOPLE: NOT DIFFICULT AT ALL
GAD7 TOTAL SCORE: 2
7. FEELING AFRAID AS IF SOMETHING AWFUL MIGHT HAPPEN: NOT AT ALL
6. BECOMING EASILY ANNOYED OR IRRITABLE: SEVERAL DAYS
5. BEING SO RESTLESS THAT IT IS HARD TO SIT STILL: NOT AT ALL
2. NOT BEING ABLE TO STOP OR CONTROL WORRYING: NOT AT ALL

## 2025-07-22 ASSESSMENT — PATIENT HEALTH QUESTIONNAIRE - PHQ9
SUM OF ALL RESPONSES TO PHQ QUESTIONS 1-9: 2
5. POOR APPETITE OR OVEREATING: NOT AT ALL

## 2025-07-22 NOTE — PROGRESS NOTES
Assessment & Plan     Virginia was seen today for referral.    Diagnoses and all orders for this visit:    Hyperlipidemia, unspecified hyperlipidemia type  Recent Labs   Lab Test 07/10/25  0809 02/25/25  0819   CHOL 325* 234*    57   * 150*   TRIG 80 134     Plan repeat fasting lipid panel.  Discussed statin therapy, patient to consider.    -     Lipid panel reflex to direct LDL Fasting  -     Lipid panel reflex to direct LDL Fasting; Future    Class 1 obesity with serious comorbidity and body mass index (BMI) of 32.0 to 32.9 in adult, unspecified obesity type  Previously on compounded sublingual Semaglutide without efficacy.  Will plan trial of Wellbutrin XL for treatment of weight gain and fatigue.  Recommend close follow-up in 4 weeks, sooner as needed.    -     buPROPion (WELLBUTRIN XL) 150 MG 24 hr tablet; Take 1 tablet (150 mg) by mouth every morning.      Return in about 4 weeks (around 8/19/2025) for Med check, In Clinic.    The longitudinal plan of care for the diagnosis(es)/condition(s) as documented were addressed during this visit. Due to the added complexity in care, I will continue to support Virginia in the subsequent management and with ongoing continuity of care.      Subjective   Virginia is a 60 year old, presenting for the following health issues:  Referral        7/22/2025    10:32 AM   Additional Questions   Roomed by Charlene GRANDA MA   Accompanied by Self     History of Present Illness       Reason for visit:  Referral   She is taking medications regularly.      Recent Labs   Lab Test 07/10/25  0809 02/25/25  0819   CHOL 325* 234*    57   * 150*   TRIG 80 134       Completed sublingual Semaglutide and found that this wasn't helpful.      Gained 25 lbs in the past 2.5 months.   Is feeling low about weight gain.   +Fatigue.    Wt Readings from Last 4 Encounters:   07/22/25 89.8 kg (198 lb)   07/07/25 89.8 kg (198 lb)   05/27/25 82.6 kg (182 lb)   05/12/25 81.2 kg (179 lb 0.2 oz)         " 2/23/2021    11:18 AM 7/22/2025    11:04 AM   PHQ   PHQ-9 Total Score 7 2   Q9: Thoughts of better off dead/self-harm past 2 weeks Not at all Not at all         9/29/2014    10:27 AM 2/23/2021    11:18 AM 7/22/2025    11:04 AM   TAINA-7 SCORE   Total Score 2 (minimal anxiety)     Total Score  6 2             Review of Systems  Constitutional, HEENT, cardiovascular, pulmonary, gi and gu systems are negative, except as otherwise noted.      Objective    /82   Pulse 89   Temp 97.6  F (36.4  C) (Tympanic)   Resp 16   Ht 1.651 m (5' 5\")   Wt 89.8 kg (198 lb)   LMP 05/31/2006   SpO2 98%   BMI 32.95 kg/m    Body mass index is 32.95 kg/m .    Physical Exam     GENERAL: alert and no distress  RESP: lungs clear to auscultation - no rales, rhonchi or wheezes  CV: regular rate and rhythm, normal S1 S2  PSYCH: mentation appears normal, affect normal/bright            Signed Electronically by: Linda Macdonald, APRN CNP    "

## 2025-07-23 LAB
CHOLEST SERPL-MCNC: 305 MG/DL
FASTING STATUS PATIENT QL REPORTED: YES
HDLC SERPL-MCNC: 100 MG/DL
LDLC SERPL CALC-MCNC: 183 MG/DL
NONHDLC SERPL-MCNC: 205 MG/DL
TRIGL SERPL-MCNC: 109 MG/DL

## 2025-07-31 ENCOUNTER — LAB (OUTPATIENT)
Dept: LAB | Facility: CLINIC | Age: 60
End: 2025-07-31
Payer: MEDICARE

## 2025-07-31 DIAGNOSIS — Z94.4 LIVER REPLACED BY TRANSPLANT (H): ICD-10-CM

## 2025-07-31 DIAGNOSIS — K50.119 CROHN'S DISEASE OF PERIANAL REGION WITH COMPLICATION (H): ICD-10-CM

## 2025-07-31 DIAGNOSIS — K70.31 ALCOHOLIC CIRRHOSIS OF LIVER WITH ASCITES (H): ICD-10-CM

## 2025-07-31 DIAGNOSIS — T86.41 LIVER TRANSPLANT REJECTION (H): ICD-10-CM

## 2025-07-31 DIAGNOSIS — F10.11 ALCOHOL ABUSE, IN REMISSION: ICD-10-CM

## 2025-07-31 LAB
ALBUMIN SERPL BCG-MCNC: 4.3 G/DL (ref 3.5–5.2)
ALP SERPL-CCNC: 100 U/L (ref 40–150)
ALT SERPL W P-5'-P-CCNC: 34 U/L (ref 0–50)
ANION GAP SERPL CALCULATED.3IONS-SCNC: 11 MMOL/L (ref 7–15)
AST SERPL W P-5'-P-CCNC: 24 U/L (ref 0–45)
BASOPHILS # BLD AUTO: 0 10E3/UL (ref 0–0.2)
BASOPHILS NFR BLD AUTO: 0 %
BILIRUB DIRECT SERPL-MCNC: 0.24 MG/DL (ref 0–0.3)
BILIRUB SERPL-MCNC: 0.9 MG/DL
BUN SERPL-MCNC: 27.7 MG/DL (ref 8–23)
CALCIUM SERPL-MCNC: 8.9 MG/DL (ref 8.8–10.4)
CHLORIDE SERPL-SCNC: 106 MMOL/L (ref 98–107)
CREAT SERPL-MCNC: 0.91 MG/DL (ref 0.51–0.95)
CRP SERPL-MCNC: 4.51 MG/L
CYCLOSPORINE BLD LC/MS/MS-MCNC: 77 UG/L (ref 50–400)
EGFRCR SERPLBLD CKD-EPI 2021: 71 ML/MIN/1.73M2
EOSINOPHIL # BLD AUTO: 0.2 10E3/UL (ref 0–0.7)
EOSINOPHIL NFR BLD AUTO: 3 %
ERYTHROCYTE [DISTWIDTH] IN BLOOD BY AUTOMATED COUNT: 13.4 % (ref 10–15)
GLUCOSE SERPL-MCNC: 95 MG/DL (ref 70–99)
HCO3 SERPL-SCNC: 25 MMOL/L (ref 22–29)
HCT VFR BLD AUTO: 35.7 % (ref 35–47)
HGB BLD-MCNC: 11.8 G/DL (ref 11.7–15.7)
IMM GRANULOCYTES # BLD: 0 10E3/UL
IMM GRANULOCYTES NFR BLD: 0 %
LYMPHOCYTES # BLD AUTO: 1.3 10E3/UL (ref 0.8–5.3)
LYMPHOCYTES NFR BLD AUTO: 25 %
MAGNESIUM SERPL-MCNC: 1.9 MG/DL (ref 1.7–2.3)
MCH RBC QN AUTO: 29.9 PG (ref 26.5–33)
MCHC RBC AUTO-ENTMCNC: 33.1 G/DL (ref 31.5–36.5)
MCV RBC AUTO: 91 FL (ref 78–100)
MONOCYTES # BLD AUTO: 0.5 10E3/UL (ref 0–1.3)
MONOCYTES NFR BLD AUTO: 9 %
NEUTROPHILS # BLD AUTO: 3.3 10E3/UL (ref 1.6–8.3)
NEUTROPHILS NFR BLD AUTO: 63 %
NRBC # BLD AUTO: 0 10E3/UL
NRBC BLD AUTO-RTO: 0 /100
PHOSPHATE SERPL-MCNC: 3.4 MG/DL (ref 2.5–4.5)
PLATELET # BLD AUTO: 207 10E3/UL (ref 150–450)
POTASSIUM SERPL-SCNC: 4.1 MMOL/L (ref 3.4–5.3)
PROT SERPL-MCNC: 6.7 G/DL (ref 6.4–8.3)
RBC # BLD AUTO: 3.94 10E6/UL (ref 3.8–5.2)
SODIUM SERPL-SCNC: 142 MMOL/L (ref 135–145)
TME LAST DOSE: NORMAL H
TME LAST DOSE: NORMAL H
WBC # BLD AUTO: 5.2 10E3/UL (ref 4–11)

## 2025-07-31 PROCEDURE — 80158 DRUG ASSAY CYCLOSPORINE: CPT

## 2025-07-31 PROCEDURE — 84100 ASSAY OF PHOSPHORUS: CPT

## 2025-07-31 PROCEDURE — 83735 ASSAY OF MAGNESIUM: CPT

## 2025-07-31 PROCEDURE — 80321 ALCOHOLS BIOMARKERS 1OR 2: CPT

## 2025-07-31 PROCEDURE — 80051 ELECTROLYTE PANEL: CPT

## 2025-07-31 PROCEDURE — 85014 HEMATOCRIT: CPT

## 2025-07-31 PROCEDURE — 82947 ASSAY GLUCOSE BLOOD QUANT: CPT

## 2025-07-31 PROCEDURE — 82248 BILIRUBIN DIRECT: CPT

## 2025-07-31 PROCEDURE — 36415 COLL VENOUS BLD VENIPUNCTURE: CPT

## 2025-07-31 PROCEDURE — 85004 AUTOMATED DIFF WBC COUNT: CPT

## 2025-07-31 PROCEDURE — 86140 C-REACTIVE PROTEIN: CPT

## 2025-08-05 ENCOUNTER — ANESTHESIA EVENT (OUTPATIENT)
Dept: SURGERY | Facility: AMBULATORY SURGERY CENTER | Age: 60
End: 2025-08-05
Payer: MEDICARE

## 2025-08-06 ENCOUNTER — HOSPITAL ENCOUNTER (OUTPATIENT)
Facility: AMBULATORY SURGERY CENTER | Age: 60
Discharge: HOME | End: 2025-08-06
Attending: INTERNAL MEDICINE
Payer: MEDICARE

## 2025-08-06 ENCOUNTER — ANESTHESIA (OUTPATIENT)
Dept: SURGERY | Facility: AMBULATORY SURGERY CENTER | Age: 60
End: 2025-08-06
Payer: MEDICARE

## 2025-08-06 VITALS
TEMPERATURE: 97 F | OXYGEN SATURATION: 99 % | BODY MASS INDEX: 32.99 KG/M2 | WEIGHT: 198 LBS | HEART RATE: 73 BPM | SYSTOLIC BLOOD PRESSURE: 139 MMHG | RESPIRATION RATE: 16 BRPM | DIASTOLIC BLOOD PRESSURE: 83 MMHG | HEIGHT: 65 IN

## 2025-08-06 VITALS — HEART RATE: 80 BPM

## 2025-08-06 LAB — COLONOSCOPY: NORMAL

## 2025-08-06 PROCEDURE — 88305 TISSUE EXAM BY PATHOLOGIST: CPT | Mod: 26 | Performed by: PATHOLOGY

## 2025-08-06 PROCEDURE — 88305 TISSUE EXAM BY PATHOLOGIST: CPT | Mod: TC | Performed by: INTERNAL MEDICINE

## 2025-08-06 RX ORDER — LIDOCAINE 40 MG/G
CREAM TOPICAL
Status: DISCONTINUED | OUTPATIENT
Start: 2025-08-06 | End: 2025-08-07 | Stop reason: HOSPADM

## 2025-08-06 RX ORDER — SODIUM CHLORIDE, SODIUM LACTATE, POTASSIUM CHLORIDE, CALCIUM CHLORIDE 600; 310; 30; 20 MG/100ML; MG/100ML; MG/100ML; MG/100ML
INJECTION, SOLUTION INTRAVENOUS CONTINUOUS
Status: DISCONTINUED | OUTPATIENT
Start: 2025-08-06 | End: 2025-08-07 | Stop reason: HOSPADM

## 2025-08-06 RX ORDER — PROPOFOL 10 MG/ML
INJECTION, EMULSION INTRAVENOUS CONTINUOUS PRN
Status: DISCONTINUED | OUTPATIENT
Start: 2025-08-06 | End: 2025-08-06

## 2025-08-06 RX ORDER — LIDOCAINE HYDROCHLORIDE 20 MG/ML
INJECTION, SOLUTION INFILTRATION; PERINEURAL PRN
Status: DISCONTINUED | OUTPATIENT
Start: 2025-08-06 | End: 2025-08-06

## 2025-08-06 RX ORDER — ONDANSETRON 2 MG/ML
4 INJECTION INTRAMUSCULAR; INTRAVENOUS
Status: DISCONTINUED | OUTPATIENT
Start: 2025-08-06 | End: 2025-08-07 | Stop reason: HOSPADM

## 2025-08-06 RX ORDER — PROPOFOL 10 MG/ML
INJECTION, EMULSION INTRAVENOUS PRN
Status: DISCONTINUED | OUTPATIENT
Start: 2025-08-06 | End: 2025-08-06

## 2025-08-06 RX ADMIN — PROPOFOL 200 MCG/KG/MIN: 10 INJECTION, EMULSION INTRAVENOUS at 13:39

## 2025-08-06 RX ADMIN — LIDOCAINE HYDROCHLORIDE 60 MG: 20 INJECTION, SOLUTION INFILTRATION; PERINEURAL at 13:37

## 2025-08-06 RX ADMIN — PROPOFOL 50 MG: 10 INJECTION, EMULSION INTRAVENOUS at 13:40

## 2025-08-06 RX ADMIN — SODIUM CHLORIDE, SODIUM LACTATE, POTASSIUM CHLORIDE, CALCIUM CHLORIDE: 600; 310; 30; 20 INJECTION, SOLUTION INTRAVENOUS at 12:55

## 2025-08-06 RX ADMIN — PROPOFOL 50 MG: 10 INJECTION, EMULSION INTRAVENOUS at 13:38

## 2025-08-06 ASSESSMENT — COPD QUESTIONNAIRES: COPD: 0

## 2025-08-06 ASSESSMENT — ENCOUNTER SYMPTOMS
SEIZURES: 1
ORTHOPNEA: 0

## 2025-08-06 ASSESSMENT — LIFESTYLE VARIABLES: TOBACCO_USE: 0

## 2025-08-09 ENCOUNTER — LAB (OUTPATIENT)
Dept: LAB | Facility: CLINIC | Age: 60
End: 2025-08-09
Payer: MEDICARE

## 2025-08-09 DIAGNOSIS — T86.41 LIVER TRANSPLANT REJECTION (H): ICD-10-CM

## 2025-08-09 LAB
ALBUMIN SERPL BCG-MCNC: 4.2 G/DL (ref 3.5–5.2)
ALP SERPL-CCNC: 114 U/L (ref 40–150)
ALT SERPL W P-5'-P-CCNC: 38 U/L (ref 0–50)
ANION GAP SERPL CALCULATED.3IONS-SCNC: 13 MMOL/L (ref 7–15)
AST SERPL W P-5'-P-CCNC: 32 U/L (ref 0–45)
BILIRUB DIRECT SERPL-MCNC: 0.29 MG/DL (ref 0–0.3)
BILIRUB SERPL-MCNC: 1.2 MG/DL
BUN SERPL-MCNC: 20 MG/DL (ref 8–23)
CALCIUM SERPL-MCNC: 9.1 MG/DL (ref 8.8–10.4)
CHLORIDE SERPL-SCNC: 106 MMOL/L (ref 98–107)
CREAT SERPL-MCNC: 0.98 MG/DL (ref 0.51–0.95)
CYCLOSPORINE BLD LC/MS/MS-MCNC: 125 UG/L (ref 50–400)
EGFRCR SERPLBLD CKD-EPI 2021: 65 ML/MIN/1.73M2
ERYTHROCYTE [DISTWIDTH] IN BLOOD BY AUTOMATED COUNT: 13.1 % (ref 10–15)
GLUCOSE SERPL-MCNC: 88 MG/DL (ref 70–99)
HCO3 SERPL-SCNC: 23 MMOL/L (ref 22–29)
HCT VFR BLD AUTO: 34.7 % (ref 35–47)
HGB BLD-MCNC: 11.8 G/DL (ref 11.7–15.7)
MAGNESIUM SERPL-MCNC: 2 MG/DL (ref 1.7–2.3)
MCH RBC QN AUTO: 29.9 PG (ref 26.5–33)
MCHC RBC AUTO-ENTMCNC: 34 G/DL (ref 31.5–36.5)
MCV RBC AUTO: 88 FL (ref 78–100)
PHOSPHATE SERPL-MCNC: 3.4 MG/DL (ref 2.5–4.5)
PLATELET # BLD AUTO: 227 10E3/UL (ref 150–450)
POTASSIUM SERPL-SCNC: 4.4 MMOL/L (ref 3.4–5.3)
PROT SERPL-MCNC: 6.8 G/DL (ref 6.4–8.3)
RBC # BLD AUTO: 3.95 10E6/UL (ref 3.8–5.2)
SODIUM SERPL-SCNC: 142 MMOL/L (ref 135–145)
TME LAST DOSE: NORMAL H
TME LAST DOSE: NORMAL H
WBC # BLD AUTO: 4.6 10E3/UL (ref 4–11)

## 2025-08-09 PROCEDURE — 82248 BILIRUBIN DIRECT: CPT

## 2025-08-09 PROCEDURE — 84100 ASSAY OF PHOSPHORUS: CPT

## 2025-08-09 PROCEDURE — 36415 COLL VENOUS BLD VENIPUNCTURE: CPT

## 2025-08-09 PROCEDURE — 80158 DRUG ASSAY CYCLOSPORINE: CPT

## 2025-08-09 PROCEDURE — 83735 ASSAY OF MAGNESIUM: CPT

## 2025-08-09 PROCEDURE — 82310 ASSAY OF CALCIUM: CPT

## 2025-08-09 PROCEDURE — 85027 COMPLETE CBC AUTOMATED: CPT

## 2025-08-12 ENCOUNTER — OFFICE VISIT (OUTPATIENT)
Dept: FAMILY MEDICINE | Facility: CLINIC | Age: 60
End: 2025-08-12
Payer: MEDICARE

## 2025-08-12 VITALS
RESPIRATION RATE: 14 BRPM | HEART RATE: 87 BPM | OXYGEN SATURATION: 96 % | WEIGHT: 199.5 LBS | SYSTOLIC BLOOD PRESSURE: 132 MMHG | TEMPERATURE: 98.4 F | DIASTOLIC BLOOD PRESSURE: 74 MMHG | BODY MASS INDEX: 33.2 KG/M2

## 2025-08-12 DIAGNOSIS — E66.811 CLASS 1 OBESITY WITH SERIOUS COMORBIDITY AND BODY MASS INDEX (BMI) OF 32.0 TO 32.9 IN ADULT, UNSPECIFIED OBESITY TYPE: Primary | ICD-10-CM

## 2025-08-12 DIAGNOSIS — H60.502 ACUTE OTITIS EXTERNA OF LEFT EAR, UNSPECIFIED TYPE: ICD-10-CM

## 2025-08-12 PROCEDURE — G2211 COMPLEX E/M VISIT ADD ON: HCPCS | Performed by: NURSE PRACTITIONER

## 2025-08-12 PROCEDURE — 3075F SYST BP GE 130 - 139MM HG: CPT | Performed by: NURSE PRACTITIONER

## 2025-08-12 PROCEDURE — 99213 OFFICE O/P EST LOW 20 MIN: CPT | Performed by: NURSE PRACTITIONER

## 2025-08-12 PROCEDURE — 3078F DIAST BP <80 MM HG: CPT | Performed by: NURSE PRACTITIONER

## 2025-08-12 RX ORDER — OFLOXACIN 3 MG/ML
5 SOLUTION AURICULAR (OTIC) 2 TIMES DAILY
Qty: 10 ML | Refills: 0 | Status: SHIPPED | OUTPATIENT
Start: 2025-08-12 | End: 2025-08-17

## 2025-08-12 RX ORDER — BUPROPION HYDROCHLORIDE 300 MG/1
300 TABLET ORAL EVERY MORNING
Qty: 30 TABLET | Refills: 5 | Status: SHIPPED | OUTPATIENT
Start: 2025-08-12

## 2025-08-14 ENCOUNTER — LAB (OUTPATIENT)
Dept: LAB | Facility: CLINIC | Age: 60
End: 2025-08-14
Payer: MEDICARE

## 2025-08-14 DIAGNOSIS — T86.41 LIVER TRANSPLANT REJECTION (H): ICD-10-CM

## 2025-08-14 LAB
ALBUMIN SERPL BCG-MCNC: 4.2 G/DL (ref 3.5–5.2)
ALP SERPL-CCNC: 116 U/L (ref 40–150)
ALT SERPL W P-5'-P-CCNC: 35 U/L (ref 0–50)
ANION GAP SERPL CALCULATED.3IONS-SCNC: 13 MMOL/L (ref 7–15)
AST SERPL W P-5'-P-CCNC: 28 U/L (ref 0–45)
BILIRUB DIRECT SERPL-MCNC: 0.28 MG/DL (ref 0–0.3)
BILIRUB SERPL-MCNC: 1.2 MG/DL
BUN SERPL-MCNC: 30.3 MG/DL (ref 8–23)
CALCIUM SERPL-MCNC: 9.3 MG/DL (ref 8.8–10.4)
CHLORIDE SERPL-SCNC: 103 MMOL/L (ref 98–107)
CREAT SERPL-MCNC: 1.05 MG/DL (ref 0.51–0.95)
CYCLOSPORINE BLD LC/MS/MS-MCNC: 115 UG/L (ref 50–400)
EGFRCR SERPLBLD CKD-EPI 2021: 60 ML/MIN/1.73M2
ERYTHROCYTE [DISTWIDTH] IN BLOOD BY AUTOMATED COUNT: 13.2 % (ref 10–15)
GLUCOSE SERPL-MCNC: 88 MG/DL (ref 70–99)
HCO3 SERPL-SCNC: 22 MMOL/L (ref 22–29)
HCT VFR BLD AUTO: 36.5 % (ref 35–47)
HGB BLD-MCNC: 12.3 G/DL (ref 11.7–15.7)
MAGNESIUM SERPL-MCNC: 2 MG/DL (ref 1.7–2.3)
MCH RBC QN AUTO: 29.6 PG (ref 26.5–33)
MCHC RBC AUTO-ENTMCNC: 33.7 G/DL (ref 31.5–36.5)
MCV RBC AUTO: 88 FL (ref 78–100)
PHOSPHATE SERPL-MCNC: 3.9 MG/DL (ref 2.5–4.5)
PLATELET # BLD AUTO: 244 10E3/UL (ref 150–450)
POTASSIUM SERPL-SCNC: 4.1 MMOL/L (ref 3.4–5.3)
PROT SERPL-MCNC: 6.8 G/DL (ref 6.4–8.3)
RBC # BLD AUTO: 4.15 10E6/UL (ref 3.8–5.2)
SODIUM SERPL-SCNC: 138 MMOL/L (ref 135–145)
TME LAST DOSE: NORMAL H
TME LAST DOSE: NORMAL H
WBC # BLD AUTO: 4.73 10E3/UL (ref 4–11)

## 2025-08-14 PROCEDURE — 83735 ASSAY OF MAGNESIUM: CPT

## 2025-08-14 PROCEDURE — 84100 ASSAY OF PHOSPHORUS: CPT

## 2025-08-14 PROCEDURE — 82248 BILIRUBIN DIRECT: CPT

## 2025-08-14 PROCEDURE — 82310 ASSAY OF CALCIUM: CPT

## 2025-08-14 PROCEDURE — 85027 COMPLETE CBC AUTOMATED: CPT

## 2025-08-14 PROCEDURE — 80158 DRUG ASSAY CYCLOSPORINE: CPT

## 2025-08-14 PROCEDURE — 36415 COLL VENOUS BLD VENIPUNCTURE: CPT

## 2025-08-19 ENCOUNTER — TELEPHONE (OUTPATIENT)
Dept: TRANSPLANT | Facility: CLINIC | Age: 60
End: 2025-08-19
Payer: MEDICARE

## 2025-08-19 DIAGNOSIS — T86.41 LIVER TRANSPLANT REJECTION (H): Primary | ICD-10-CM

## 2025-08-19 DIAGNOSIS — Z94.4 S/P LIVER TRANSPLANT (H): ICD-10-CM

## 2025-08-19 RX ORDER — CYCLOSPORINE 25 MG/1
25 CAPSULE, LIQUID FILLED ORAL EVERY 12 HOURS
Qty: 180 CAPSULE | Refills: 3 | Status: SHIPPED | OUTPATIENT
Start: 2025-08-19

## 2025-08-19 RX ORDER — CYCLOSPORINE 100 MG/1
100 CAPSULE, LIQUID FILLED ORAL EVERY 12 HOURS
Qty: 180 CAPSULE | Refills: 3 | Status: SHIPPED | OUTPATIENT
Start: 2025-08-19

## 2025-08-20 PROBLEM — D12.6 TUBULAR ADENOMA OF COLON: Status: ACTIVE | Noted: 2025-08-20

## 2025-08-21 ENCOUNTER — LAB (OUTPATIENT)
Dept: LAB | Facility: CLINIC | Age: 60
End: 2025-08-21
Payer: MEDICARE

## 2025-08-21 ENCOUNTER — TELEPHONE (OUTPATIENT)
Dept: TRANSPLANT | Facility: CLINIC | Age: 60
End: 2025-08-21

## 2025-08-21 DIAGNOSIS — T86.41 LIVER TRANSPLANT REJECTION (H): ICD-10-CM

## 2025-08-21 DIAGNOSIS — T86.41 LIVER TRANSPLANT REJECTION (H): Primary | ICD-10-CM

## 2025-08-21 LAB
ALBUMIN SERPL BCG-MCNC: 4.1 G/DL (ref 3.5–5.2)
ALP SERPL-CCNC: 111 U/L (ref 40–150)
ALT SERPL W P-5'-P-CCNC: 39 U/L (ref 0–50)
ANION GAP SERPL CALCULATED.3IONS-SCNC: 11 MMOL/L (ref 7–15)
AST SERPL W P-5'-P-CCNC: 28 U/L (ref 0–45)
BILIRUB DIRECT SERPL-MCNC: 0.23 MG/DL (ref 0–0.3)
BILIRUB SERPL-MCNC: 0.8 MG/DL
BUN SERPL-MCNC: 29.4 MG/DL (ref 8–23)
CALCIUM SERPL-MCNC: 9 MG/DL (ref 8.8–10.4)
CHLORIDE SERPL-SCNC: 106 MMOL/L (ref 98–107)
CREAT SERPL-MCNC: 0.95 MG/DL (ref 0.51–0.95)
CYCLOSPORINE BLD LC/MS/MS-MCNC: 124 UG/L (ref 50–400)
EGFRCR SERPLBLD CKD-EPI 2021: 68 ML/MIN/1.73M2
ERYTHROCYTE [DISTWIDTH] IN BLOOD BY AUTOMATED COUNT: 13.3 % (ref 10–15)
GLUCOSE SERPL-MCNC: 94 MG/DL (ref 70–99)
HCO3 SERPL-SCNC: 25 MMOL/L (ref 22–29)
HCT VFR BLD AUTO: 37.1 % (ref 35–47)
HGB BLD-MCNC: 12.4 G/DL (ref 11.7–15.7)
MCH RBC QN AUTO: 29.5 PG (ref 26.5–33)
MCHC RBC AUTO-ENTMCNC: 33.4 G/DL (ref 31.5–36.5)
MCV RBC AUTO: 88.1 FL (ref 78–100)
PHOSPHATE SERPL-MCNC: 4.2 MG/DL (ref 2.5–4.5)
PLATELET # BLD AUTO: 222 10E3/UL (ref 150–450)
POTASSIUM SERPL-SCNC: 4.1 MMOL/L (ref 3.4–5.3)
PROT SERPL-MCNC: 6.9 G/DL (ref 6.4–8.3)
RBC # BLD AUTO: 4.21 10E6/UL (ref 3.8–5.2)
SODIUM SERPL-SCNC: 142 MMOL/L (ref 135–145)
TME LAST DOSE: NORMAL H
TME LAST DOSE: NORMAL H
WBC # BLD AUTO: 4.27 10E3/UL (ref 4–11)

## 2025-08-21 PROCEDURE — 84100 ASSAY OF PHOSPHORUS: CPT

## 2025-08-21 PROCEDURE — 82310 ASSAY OF CALCIUM: CPT

## 2025-08-21 PROCEDURE — 36415 COLL VENOUS BLD VENIPUNCTURE: CPT

## 2025-08-21 PROCEDURE — 80158 DRUG ASSAY CYCLOSPORINE: CPT

## 2025-08-21 PROCEDURE — 85014 HEMATOCRIT: CPT

## 2025-08-21 PROCEDURE — 82248 BILIRUBIN DIRECT: CPT

## 2025-08-21 RX ORDER — PREDNISONE 5 MG/1
5 TABLET ORAL SEE ADMIN INSTRUCTIONS
Qty: 30 TABLET | Refills: 1 | Status: SHIPPED | OUTPATIENT
Start: 2025-08-21

## 2025-08-21 RX ORDER — PREDNISONE 1 MG/1
2 TABLET ORAL DAILY
Qty: 30 TABLET | Refills: 0 | Status: SHIPPED | OUTPATIENT
Start: 2025-08-21

## 2025-08-28 ENCOUNTER — LAB (OUTPATIENT)
Dept: LAB | Facility: CLINIC | Age: 60
End: 2025-08-28
Payer: MEDICARE

## 2025-08-28 DIAGNOSIS — K70.31 ALCOHOLIC CIRRHOSIS OF LIVER WITH ASCITES (H): ICD-10-CM

## 2025-08-28 DIAGNOSIS — F10.11 ALCOHOL ABUSE, IN REMISSION: ICD-10-CM

## 2025-08-28 DIAGNOSIS — T86.41 LIVER TRANSPLANT REJECTION (H): ICD-10-CM

## 2025-08-28 DIAGNOSIS — Z94.4 LIVER REPLACED BY TRANSPLANT (H): ICD-10-CM

## 2025-08-28 LAB
ALBUMIN SERPL BCG-MCNC: 4.4 G/DL (ref 3.5–5.2)
ALP SERPL-CCNC: 129 U/L (ref 40–150)
ALT SERPL W P-5'-P-CCNC: 38 U/L (ref 0–50)
ANION GAP SERPL CALCULATED.3IONS-SCNC: 9 MMOL/L (ref 7–15)
AST SERPL W P-5'-P-CCNC: 31 U/L (ref 0–45)
BILIRUB DIRECT SERPL-MCNC: 0.27 MG/DL (ref 0–0.3)
BILIRUB SERPL-MCNC: 1.1 MG/DL
BUN SERPL-MCNC: 23.6 MG/DL (ref 8–23)
CALCIUM SERPL-MCNC: 9.2 MG/DL (ref 8.8–10.4)
CHLORIDE SERPL-SCNC: 106 MMOL/L (ref 98–107)
CREAT SERPL-MCNC: 1.16 MG/DL (ref 0.51–0.95)
CYCLOSPORINE BLD LC/MS/MS-MCNC: 125 UG/L (ref 50–400)
EGFRCR SERPLBLD CKD-EPI 2021: 53 ML/MIN/1.73M2
ERYTHROCYTE [DISTWIDTH] IN BLOOD BY AUTOMATED COUNT: 13.2 % (ref 10–15)
GLUCOSE SERPL-MCNC: 94 MG/DL (ref 70–99)
HCO3 SERPL-SCNC: 25 MMOL/L (ref 22–29)
HCT VFR BLD AUTO: 36.9 % (ref 35–47)
HGB BLD-MCNC: 12.2 G/DL (ref 11.7–15.7)
MAGNESIUM SERPL-MCNC: 1.9 MG/DL (ref 1.7–2.3)
MCH RBC QN AUTO: 29.3 PG (ref 26.5–33)
MCHC RBC AUTO-ENTMCNC: 33.1 G/DL (ref 31.5–36.5)
MCV RBC AUTO: 88.7 FL (ref 78–100)
PHOSPHATE SERPL-MCNC: 4.1 MG/DL (ref 2.5–4.5)
PLATELET # BLD AUTO: 204 10E3/UL (ref 150–450)
POTASSIUM SERPL-SCNC: 4.4 MMOL/L (ref 3.4–5.3)
PROT SERPL-MCNC: 7.1 G/DL (ref 6.4–8.3)
RBC # BLD AUTO: 4.16 10E6/UL (ref 3.8–5.2)
SODIUM SERPL-SCNC: 140 MMOL/L (ref 135–145)
TME LAST DOSE: NORMAL H
TME LAST DOSE: NORMAL H
WBC # BLD AUTO: 3.72 10E3/UL (ref 4–11)

## 2025-08-28 PROCEDURE — 80321 ALCOHOLS BIOMARKERS 1OR 2: CPT

## 2025-08-28 PROCEDURE — 80158 DRUG ASSAY CYCLOSPORINE: CPT

## 2025-08-28 PROCEDURE — 82248 BILIRUBIN DIRECT: CPT

## 2025-08-28 PROCEDURE — 36415 COLL VENOUS BLD VENIPUNCTURE: CPT

## 2025-08-28 PROCEDURE — 85018 HEMOGLOBIN: CPT

## 2025-08-28 PROCEDURE — 84100 ASSAY OF PHOSPHORUS: CPT

## 2025-08-28 PROCEDURE — 82947 ASSAY GLUCOSE BLOOD QUANT: CPT

## 2025-08-28 PROCEDURE — 83735 ASSAY OF MAGNESIUM: CPT

## 2025-09-02 ENCOUNTER — MYC MEDICAL ADVICE (OUTPATIENT)
Dept: FAMILY MEDICINE | Facility: CLINIC | Age: 60
End: 2025-09-02
Payer: MEDICARE

## 2025-09-02 ENCOUNTER — TELEPHONE (OUTPATIENT)
Dept: TRANSPLANT | Facility: CLINIC | Age: 60
End: 2025-09-02
Payer: MEDICARE

## 2025-09-02 DIAGNOSIS — E66.811 CLASS 1 OBESITY WITH SERIOUS COMORBIDITY AND BODY MASS INDEX (BMI) OF 32.0 TO 32.9 IN ADULT, UNSPECIFIED OBESITY TYPE: ICD-10-CM

## 2025-09-02 DIAGNOSIS — T86.41 LIVER TRANSPLANT REJECTION (H): Primary | ICD-10-CM

## 2025-09-02 RX ORDER — PREDNISONE 1 MG/1
1 TABLET ORAL DAILY
Qty: 30 TABLET | Refills: 0 | Status: SHIPPED | OUTPATIENT
Start: 2025-09-02

## 2025-09-02 RX ORDER — PREDNISONE 5 MG/1
5 TABLET ORAL SEE ADMIN INSTRUCTIONS
Qty: 30 TABLET | Refills: 1 | Status: SHIPPED | OUTPATIENT
Start: 2025-09-02

## 2025-09-04 ENCOUNTER — LAB (OUTPATIENT)
Dept: LAB | Facility: CLINIC | Age: 60
End: 2025-09-04
Payer: MEDICARE

## 2025-09-04 ENCOUNTER — INFUSION THERAPY VISIT (OUTPATIENT)
Dept: INFUSION THERAPY | Facility: CLINIC | Age: 60
End: 2025-09-04
Attending: SURGERY
Payer: MEDICARE

## 2025-09-04 ENCOUNTER — RESULTS FOLLOW-UP (OUTPATIENT)
Dept: TRANSPLANT | Facility: CLINIC | Age: 60
End: 2025-09-04

## 2025-09-04 VITALS
HEART RATE: 91 BPM | TEMPERATURE: 98.2 F | SYSTOLIC BLOOD PRESSURE: 138 MMHG | DIASTOLIC BLOOD PRESSURE: 84 MMHG | RESPIRATION RATE: 18 BRPM

## 2025-09-04 DIAGNOSIS — Z94.4 S/P LIVER TRANSPLANT (H): Primary | ICD-10-CM

## 2025-09-04 DIAGNOSIS — F10.11 ALCOHOL ABUSE, IN REMISSION: ICD-10-CM

## 2025-09-04 DIAGNOSIS — Z94.4 LIVER REPLACED BY TRANSPLANT (H): ICD-10-CM

## 2025-09-04 DIAGNOSIS — K50.119 CROHN'S DISEASE OF PERIANAL REGION WITH COMPLICATION (H): Primary | ICD-10-CM

## 2025-09-04 DIAGNOSIS — T86.41 LIVER TRANSPLANT REJECTION (H): ICD-10-CM

## 2025-09-04 DIAGNOSIS — K70.31 ALCOHOLIC CIRRHOSIS OF LIVER WITH ASCITES (H): ICD-10-CM

## 2025-09-04 LAB
ALBUMIN SERPL BCG-MCNC: 4.3 G/DL (ref 3.5–5.2)
ALP SERPL-CCNC: 126 U/L (ref 40–150)
ALT SERPL W P-5'-P-CCNC: 34 U/L (ref 0–50)
ANION GAP SERPL CALCULATED.3IONS-SCNC: 13 MMOL/L (ref 7–15)
AST SERPL W P-5'-P-CCNC: 34 U/L (ref 0–45)
BILIRUB DIRECT SERPL-MCNC: 0.34 MG/DL (ref 0–0.3)
BILIRUB SERPL-MCNC: 1.4 MG/DL
BUN SERPL-MCNC: 26.8 MG/DL (ref 8–23)
CALCIUM SERPL-MCNC: 9 MG/DL (ref 8.8–10.4)
CHLORIDE SERPL-SCNC: 104 MMOL/L (ref 98–107)
CREAT SERPL-MCNC: 1.24 MG/DL (ref 0.51–0.95)
CRP SERPL-MCNC: 4.84 MG/L
CYCLOSPORINE BLD LC/MS/MS-MCNC: 125 UG/L (ref 50–400)
EGFRCR SERPLBLD CKD-EPI 2021: 49 ML/MIN/1.73M2
ERYTHROCYTE [DISTWIDTH] IN BLOOD BY AUTOMATED COUNT: 13.2 % (ref 10–15)
GLUCOSE SERPL-MCNC: 98 MG/DL (ref 70–99)
HCO3 SERPL-SCNC: 21 MMOL/L (ref 22–29)
HCT VFR BLD AUTO: 35.2 % (ref 35–47)
HGB BLD-MCNC: 12.1 G/DL (ref 11.7–15.7)
MAGNESIUM SERPL-MCNC: 1.9 MG/DL (ref 1.7–2.3)
MCH RBC QN AUTO: 29.7 PG (ref 26.5–33)
MCHC RBC AUTO-ENTMCNC: 34.4 G/DL (ref 31.5–36.5)
MCV RBC AUTO: 86.3 FL (ref 78–100)
PHOSPHATE SERPL-MCNC: 4.2 MG/DL (ref 2.5–4.5)
PLATELET # BLD AUTO: 183 10E3/UL (ref 150–450)
POTASSIUM SERPL-SCNC: 4.2 MMOL/L (ref 3.4–5.3)
PROT SERPL-MCNC: 6.9 G/DL (ref 6.4–8.3)
RBC # BLD AUTO: 4.08 10E6/UL (ref 3.8–5.2)
SODIUM SERPL-SCNC: 138 MMOL/L (ref 135–145)
TME LAST DOSE: NORMAL H
TME LAST DOSE: NORMAL H
WBC # BLD AUTO: 3.33 10E3/UL (ref 4–11)

## 2025-09-04 PROCEDURE — 36415 COLL VENOUS BLD VENIPUNCTURE: CPT | Performed by: INTERNAL MEDICINE

## 2025-09-04 PROCEDURE — 80321 ALCOHOLS BIOMARKERS 1OR 2: CPT

## 2025-09-04 PROCEDURE — 82374 ASSAY BLOOD CARBON DIOXIDE: CPT

## 2025-09-04 PROCEDURE — 83735 ASSAY OF MAGNESIUM: CPT

## 2025-09-04 PROCEDURE — 36415 COLL VENOUS BLD VENIPUNCTURE: CPT

## 2025-09-04 PROCEDURE — 82248 BILIRUBIN DIRECT: CPT

## 2025-09-04 PROCEDURE — 85048 AUTOMATED LEUKOCYTE COUNT: CPT

## 2025-09-04 PROCEDURE — 80158 DRUG ASSAY CYCLOSPORINE: CPT

## 2025-09-04 PROCEDURE — 258N000003 HC RX IP 258 OP 636: Performed by: INTERNAL MEDICINE

## 2025-09-04 PROCEDURE — 84100 ASSAY OF PHOSPHORUS: CPT

## 2025-09-04 RX ORDER — MEPERIDINE HYDROCHLORIDE 25 MG/ML
25 INJECTION INTRAMUSCULAR; INTRAVENOUS; SUBCUTANEOUS
OUTPATIENT
Start: 2025-10-27

## 2025-09-04 RX ORDER — DIPHENHYDRAMINE HYDROCHLORIDE 50 MG/ML
50 INJECTION INTRAMUSCULAR; INTRAVENOUS
Start: 2025-10-27

## 2025-09-04 RX ORDER — METHYLPREDNISOLONE SODIUM SUCCINATE 40 MG/ML
40 INJECTION INTRAMUSCULAR; INTRAVENOUS
Start: 2025-10-27

## 2025-09-04 RX ORDER — BUPROPION HYDROCHLORIDE 150 MG/1
150 TABLET ORAL EVERY MORNING
Qty: 90 TABLET | Refills: 1 | Status: SHIPPED | OUTPATIENT
Start: 2025-09-04

## 2025-09-04 RX ORDER — DIPHENHYDRAMINE HYDROCHLORIDE 50 MG/ML
25 INJECTION INTRAMUSCULAR; INTRAVENOUS
Start: 2025-10-27

## 2025-09-04 RX ORDER — EPINEPHRINE 1 MG/ML
0.3 INJECTION, SOLUTION INTRAMUSCULAR; SUBCUTANEOUS EVERY 5 MIN PRN
OUTPATIENT
Start: 2025-10-27

## 2025-09-04 RX ORDER — ALBUTEROL SULFATE 0.83 MG/ML
2.5 SOLUTION RESPIRATORY (INHALATION)
OUTPATIENT
Start: 2025-10-27

## 2025-09-04 RX ORDER — ALBUTEROL SULFATE 90 UG/1
1-2 INHALANT RESPIRATORY (INHALATION)
Start: 2025-10-27

## 2025-09-04 RX ORDER — ACETAMINOPHEN 325 MG/1
650 TABLET ORAL ONCE
Start: 2025-10-27 | End: 2025-10-27

## 2025-09-04 RX ORDER — DIPHENHYDRAMINE HCL 25 MG
25 CAPSULE ORAL ONCE
Start: 2025-10-27 | End: 2025-10-27

## 2025-09-04 RX ADMIN — SODIUM CHLORIDE 900 MG: 9 INJECTION, SOLUTION INTRAVENOUS at 13:16

## (undated) DEVICE — ESU GROUND PAD ADULT W/CORD E7507

## (undated) DEVICE — SU PROLENE 6-0 RB-2DA 30" 8711H

## (undated) DEVICE — SU SILK 0 TIE 6X30" A306H

## (undated) DEVICE — SU PDS II 1 CTX 36" Z371T

## (undated) DEVICE — ESU ENDO SCISSORS 5MM CVD 5DCS

## (undated) DEVICE — DRSG TEGADERM 8X12" 1629

## (undated) DEVICE — SUCTION TIP YANKAUER W/O VENT K86

## (undated) DEVICE — CATH RETRIEVAL BALLOON EXTRACTOR PRO RX-S INJ ABOVE 9-12MM

## (undated) DEVICE — SU SILK 2-0 TIE 12X30" A305H

## (undated) DEVICE — SU SILK 4-0 TIE 12X30" A303H

## (undated) DEVICE — SUCTION MANIFOLD DORNOCH ULTRA CART UL-CL500

## (undated) DEVICE — SU DERMABOND ADVANCED .7ML DNX12

## (undated) DEVICE — SOL WATER IRRIG 1000ML BOTTLE 2F7114

## (undated) DEVICE — CATH BALLOON ELATION ESOPH/PYLORIC 6-7-8MMX180CM EPB6

## (undated) DEVICE — SU SILK 3-0 SH 30" K832H

## (undated) DEVICE — TUBING IRRIG CYSTO/BLADDER SET 81" LF 2C4040

## (undated) DEVICE — DRAPE SHEET REV FOLD 3/4 9349

## (undated) DEVICE — DRSG GAUZE 4X4" TRAY 6939

## (undated) DEVICE — SUCTION MANIFOLD NEPTUNE 2 SYS 1 PORT 702-025-000

## (undated) DEVICE — ANOSCOPE PLASTIC CLEAR UNSTERILE 82420

## (undated) DEVICE — SOL WATER IRRIG 500ML BOTTLE 2F7113

## (undated) DEVICE — VESSEL LOOPS RED MAXI

## (undated) DEVICE — STRAP UNIVERSAL POSITIONING 2-PIECE 4X47X76" 91-287

## (undated) DEVICE — ESU ELEC NDL 1" COATED/INSULATED E1465

## (undated) DEVICE — LINEN TOWEL PACK X6 WHITE 5487

## (undated) DEVICE — TUBE FEEDING NEOCONNECT POLY ENFIT 8FR 24" PFTM8.0P-NC

## (undated) DEVICE — SU VICRYL 0 CT-1 CR 8X27" UND JJ41G

## (undated) DEVICE — ESU LIGASURE IMPACT OPEN SEALER/DVDR CVD LG JAW LF4418

## (undated) DEVICE — ENDO TROCAR SLEEVE KII Z-THREADED 05X100MM CTS02

## (undated) DEVICE — LABEL MEDICATION SYSTEM 3303-P

## (undated) DEVICE — ENDO CATH BALLOON DILATION HURRICANE 04MMX4X180CM M00545900

## (undated) DEVICE — TUBING SUCTION MEDI-VAC 1/4"X20' N620A

## (undated) DEVICE — SU PDS II 6-0 RB-2DA 30" Z149H

## (undated) DEVICE — SHEARS HARMONIC ULTRASONIC LAP 36CM CURVE TIP HAR1136

## (undated) DEVICE — SOL NACL 0.9% IRRIG 1000ML BOTTLE 2F7124

## (undated) DEVICE — NDL 27GA 1.25" 305136

## (undated) DEVICE — INFLATION DEVICE BIG 60 ENDO-AN6012

## (undated) DEVICE — DRSG MEDIPORE 3 1/2X13 3/4" 3573

## (undated) DEVICE — GOWN ISOLATION YELLOW UNIV NOT STERILE 3110PG

## (undated) DEVICE — RX VISTASEAL FIBRIN SEALANT W/THROMBIN 10ML VST10

## (undated) DEVICE — CLIP HEMOSTASIS ASSURANCE W16 MM BX00711884

## (undated) DEVICE — TUBING SMOKE EVAC 2.2CMX3M SEA3715

## (undated) DEVICE — DEVICE SUTURE GRASPER TROCAR CLOSURE 14GA PMITCSG

## (undated) DEVICE — TUBING SMOKE EVAC PNEUMOCLEAR HIGH FLOW 0620050250

## (undated) DEVICE — PAD CHUX UNDERPAD 30X36" P3036C

## (undated) DEVICE — GLOVE PROTEXIS W/NEU-THERA 6.5  2D73TE65

## (undated) DEVICE — KIT ENDO FIRST STEP DISINFECTANT 200ML W/POUCH EP-4

## (undated) DEVICE — SUCTION MANIFOLD NEPTUNE 2 SYS 4 PORT 0702-020-000

## (undated) DEVICE — GLOVE PROTEXIS W/NEU-THERA 6.0  2D73TE60

## (undated) DEVICE — GLOVE BIOGEL PI ULTRATOUCH G SZ 7.5 42175

## (undated) DEVICE — PROBE ABLATION NEUWAVE CERTUS 140 25CM 13GA SR25

## (undated) DEVICE — SYR 10ML FINGER CONTROL W/O NDL 309695

## (undated) DEVICE — SU SILK 1 TIE 6X30" A307H

## (undated) DEVICE — ENDO TRAP POLYP E-TRAP 00711099

## (undated) DEVICE — GUIDEWIRE NOVAGOLD .018X260CM STR TIP M00552000

## (undated) DEVICE — SPECIMEN CONTAINER 3OZ W/FORMALIN 59901

## (undated) DEVICE — GOWN IMPERVIOUS 2XL BLUE

## (undated) DEVICE — SWAB PROCTO 16" 2/PK 32-046

## (undated) DEVICE — ENDO FORCEP BX CAPTURA PRO SPIKE G50696

## (undated) DEVICE — SU PDS II 1 TP-1 54" Z879G

## (undated) DEVICE — BIOPSY VALVE BIOSHIELD 00711135

## (undated) DEVICE — SU VICRYL 1 CT-1 27" UND J261H

## (undated) DEVICE — GLOVE PROTEXIS BLUE W/NEU-THERA 6.5  2D73EB65

## (undated) DEVICE — NDL INSUFFLATION 13GA 120MM C2201

## (undated) DEVICE — SPECIMEN CONTAINER 5OZ STERILE 2600SA

## (undated) DEVICE — SU PROLENE 3-0 RB-1DA 36"  8558H

## (undated) DEVICE — STPL SKIN 35W ROTATING HEAD PRW35

## (undated) DEVICE — SU PROLENE 3-0 SH-1 30" 8762H

## (undated) DEVICE — SYR 10ML SLIP TIP W/O NDL

## (undated) DEVICE — TAPE DURAPORE 3" SILK 1538-3

## (undated) DEVICE — LINEN TOWEL PACK X30 5481

## (undated) DEVICE — NDL BX TRU CUT 14GA 4.5" 2N2702X

## (undated) DEVICE — SU PROLENE 7-0 BV-1DA 30" 8703H

## (undated) DEVICE — KIT ENDO TURNOVER/PROCEDURE CARRY-ON 101822

## (undated) DEVICE — ENDO BITE BLOCK ADULT OMNI-BLOC

## (undated) DEVICE — STPL LINEAR 30X2.5MM VASC TX30V

## (undated) DEVICE — SNARE CAPIVATOR ROUND COLD SNR BX10 M00561101

## (undated) DEVICE — PACK MINOR SBA15MIFSE

## (undated) DEVICE — WIRE GUIDE 0.025"X450CM STR VISIGLIDE G-240-2545S

## (undated) DEVICE — VESSEL LOOPS YELLOW MINI 31145660

## (undated) DEVICE — PACK ENDOSCOPY GI CUSTOM UMMC

## (undated) DEVICE — SUCTION IRR STRYKERFLOW II W/TIP 250-070-520

## (undated) DEVICE — LINEN TOWEL PACK X5 5464

## (undated) DEVICE — KIT CONNECTOR FOR OLYMPUS ENDOSCOPES DEFENDO 100310

## (undated) DEVICE — PAD CHUX UNDERPAD 30X30"

## (undated) DEVICE — ENDO CAP AND TUBING STERILE FOR ENDOGATOR  100130

## (undated) DEVICE — SU PROLENE 4-0 SHDA 36" 8521H

## (undated) DEVICE — ESU ELEC BLADE 6" COATED E1450-6

## (undated) DEVICE — CLIP APPLIER 09 3/8" SM LIGACLIP MCS20

## (undated) DEVICE — SOL NACL 0.9% INJ 1000ML BAG 2B1324X

## (undated) DEVICE — SU PROLENE 4-0 RB-1DA 36" 8557H

## (undated) DEVICE — GLOVE BIOGEL PI MICRO INDICATOR UNDERGLOVE SZ 8.0 48980

## (undated) DEVICE — SU SILK 2-0 TIE 24" SA75H

## (undated) DEVICE — PREP TECHNI-CARE CHLOROXYLENOL 3% 4OZ BOTTLE C222-4ZWO

## (undated) DEVICE — SU SILK 3-0 TIE 12X30" A304H

## (undated) DEVICE — NDL BLUNT 18GA 1" W/O FILTER 305181

## (undated) DEVICE — DEFIB PRO-PADZ LVP LQD GEL ADULT 8900-2105-01

## (undated) DEVICE — DRSG TELFA 3X8" 1238

## (undated) DEVICE — DRSG PRIMAPORE 03 1/8X6" 66000318

## (undated) DEVICE — ENDO SNARE EXACTO COLD 9MM LOOP 2.4MMX230CM 00711115

## (undated) DEVICE — DRAPE IOBAN C-SECTION W/POUCH 30X35" 6657

## (undated) DEVICE — GLOVE PROTEXIS W/NEU-THERA 7.0  2D73TE70

## (undated) DEVICE — SU SILK 2-0 SH 30" K833H

## (undated) DEVICE — SUCTION TIP YANKAUER VIAGUARD W/SLEEVE SMP-2006-001SS

## (undated) DEVICE — NDL COUNTER 40CT  31142311

## (undated) DEVICE — DRAPE IOBAN INCISE 23X17" 6650EZ

## (undated) DEVICE — WIRE GUIDE 0.025"X450CM ANG VISIGLIDE G-240-2545A

## (undated) DEVICE — SURGICEL ABSORBABLE HEMOSTAT SNOW 4"X4" 2083

## (undated) DEVICE — ENDO TROCAR FIRST ENTRY KII FIOS Z-THRD 12X100MM CTF73

## (undated) DEVICE — DRAIN JACKSON PRATT RESERVOIR 400ML SU130-1000

## (undated) DEVICE — SU PROLENE 5-0 RB-1DA 36"  8556H

## (undated) DEVICE — SPONGE LAP 18X18" X8435

## (undated) DEVICE — SYR 10ML LL W/O NDL 302995

## (undated) DEVICE — GLOVE PROTEXIS MICRO 6.5  2D73PM65

## (undated) DEVICE — PREP CHLORAPREP 26ML TINTED HI-LITE ORANGE 930815

## (undated) DEVICE — SU ETHILON 3-0 PS-1 18" 1663H

## (undated) DEVICE — ADPT 5 IN 1 360

## (undated) DEVICE — DRAPE FLUID WARMING 52 X 60" ORS-321

## (undated) DEVICE — Device

## (undated) DEVICE — GLOVE PROTEXIS BLUE W/NEU-THERA 7.5  2D73EB75

## (undated) DEVICE — ENDO TROCAR FIRST ENTRY KII FIOS Z-THRD 05X100MM CTF03

## (undated) DEVICE — CLIP APPLIER 11" MED LIGACLIP MCM30

## (undated) DEVICE — ANTIFOG SOLUTION W/FOAM PAD 31142527

## (undated) DEVICE — CLIP HEMOSTATIC ASSURANCE W11 MM 00711882

## (undated) DEVICE — ENDO FUSION OMNI-TOME G31903

## (undated) DEVICE — WIPES FOLEY CARE SURESTEP PROVON DFC100

## (undated) DEVICE — DRAPE MINOR PROCEDURE LAP 29496

## (undated) DEVICE — SU SILK 3-0 SH CR 8X18" C013D

## (undated) DEVICE — ESU PENCIL SMOKE EVAC W/ROCKER SWITCH 0703-047-000

## (undated) DEVICE — STRAP KNEE/BODY 31143004

## (undated) DEVICE — ENDO FUSION OMNI-TOME 21 FS-OMNI-21 G48675

## (undated) DEVICE — PACK RECTAL KIT CUSTOM ASC

## (undated) DEVICE — SUCTION CANISTER MEDIVAC LINER 3000ML W/LID 65651-530

## (undated) DEVICE — GLOVE PROTEXIS MICRO 7.0  2D73PM70

## (undated) DEVICE — CATH TRAY FOLEY SURESTEP 16FR W/TMP PRB STLK LATEX A319416AM

## (undated) DEVICE — ESU GROUND PAD THERMOGUARD PLUS ABC PEDS 7-382

## (undated) DEVICE — PANTIES MESH LG/XLG 2PK 706M2

## (undated) DEVICE — SU MONOCRYL 4-0 PS-2 27" UND Y426H

## (undated) DEVICE — CLIP APPLIER 13" LG LIGACLIP MCL20

## (undated) DEVICE — SPONGE BALL KERLIX ROUND XL W/O STRING LATEX 4935

## (undated) DEVICE — CLIP ENDO HEMO-LOC PURPLE LG 544240

## (undated) DEVICE — CLIP ENDO HEMO-LOC GREEN MED/LG 544230

## (undated) DEVICE — ENDO TUBING CO2 SMARTCAP STERILE DISP 100145CO2EXT

## (undated) DEVICE — ESU HANDPIECE ABC BEND-A-BEAM 6" 134006

## (undated) DEVICE — DRSG PRIMAPORE 02X3" 7133

## (undated) RX ORDER — INDOMETHACIN 50 MG/1
SUPPOSITORY RECTAL
Status: DISPENSED
Start: 2020-04-24

## (undated) RX ORDER — NITROGLYCERIN 0.4 MG/1
TABLET SUBLINGUAL
Status: DISPENSED
Start: 2023-03-27

## (undated) RX ORDER — ACETAMINOPHEN 325 MG/1
TABLET ORAL
Status: DISPENSED
Start: 2021-02-26

## (undated) RX ORDER — FENTANYL CITRATE 50 UG/ML
INJECTION, SOLUTION INTRAMUSCULAR; INTRAVENOUS
Status: DISPENSED
Start: 2019-03-28

## (undated) RX ORDER — ACETAMINOPHEN 325 MG/1
TABLET ORAL
Status: DISPENSED
Start: 2019-03-28

## (undated) RX ORDER — IVABRADINE 5 MG/1
TABLET, FILM COATED ORAL
Status: DISPENSED
Start: 2023-03-27

## (undated) RX ORDER — PENTAMIDINE ISETHIONATE 300 MG/300MG
INHALANT RESPIRATORY (INHALATION)
Status: DISPENSED
Start: 2024-10-03

## (undated) RX ORDER — FENTANYL CITRATE 50 UG/ML
INJECTION, SOLUTION INTRAMUSCULAR; INTRAVENOUS
Status: DISPENSED
Start: 2024-06-22

## (undated) RX ORDER — GLYCOPYRROLATE 0.2 MG/ML
INJECTION, SOLUTION INTRAMUSCULAR; INTRAVENOUS
Status: DISPENSED
Start: 2019-12-05

## (undated) RX ORDER — ONDANSETRON 2 MG/ML
INJECTION INTRAMUSCULAR; INTRAVENOUS
Status: DISPENSED
Start: 2023-08-29

## (undated) RX ORDER — SODIUM CHLORIDE 9 MG/ML
INJECTION, SOLUTION INTRAVENOUS
Status: DISPENSED
Start: 2023-02-27

## (undated) RX ORDER — AMPICILLIN AND SULBACTAM 2; 1 G/1; G/1
INJECTION, POWDER, FOR SOLUTION INTRAMUSCULAR; INTRAVENOUS
Status: DISPENSED
Start: 2023-02-21

## (undated) RX ORDER — PAPAVERINE HYDROCHLORIDE 30 MG/ML
INJECTION INTRAMUSCULAR; INTRAVENOUS
Status: DISPENSED
Start: 2024-06-21

## (undated) RX ORDER — AMPICILLIN AND SULBACTAM 2; 1 G/1; G/1
INJECTION, POWDER, FOR SOLUTION INTRAMUSCULAR; INTRAVENOUS
Status: DISPENSED
Start: 2023-02-27

## (undated) RX ORDER — LIDOCAINE 50 MG/G
OINTMENT TOPICAL
Status: DISPENSED
Start: 2021-03-19

## (undated) RX ORDER — LIDOCAINE HYDROCHLORIDE 10 MG/ML
INJECTION, SOLUTION EPIDURAL; INFILTRATION; INTRACAUDAL; PERINEURAL
Status: DISPENSED
Start: 2023-02-21

## (undated) RX ORDER — OXYCODONE HYDROCHLORIDE 5 MG/1
TABLET ORAL
Status: DISPENSED
Start: 2019-03-28

## (undated) RX ORDER — OXYCODONE HYDROCHLORIDE 5 MG/1
TABLET ORAL
Status: DISPENSED
Start: 2023-02-27

## (undated) RX ORDER — ALBUTEROL SULFATE 0.83 MG/ML
SOLUTION RESPIRATORY (INHALATION)
Status: DISPENSED
Start: 2024-08-05

## (undated) RX ORDER — LIDOCAINE HYDROCHLORIDE 10 MG/ML
INJECTION, SOLUTION EPIDURAL; INFILTRATION; INTRACAUDAL; PERINEURAL
Status: DISPENSED
Start: 2025-05-12

## (undated) RX ORDER — LIDOCAINE HYDROCHLORIDE 20 MG/ML
INJECTION, SOLUTION EPIDURAL; INFILTRATION; INTRACAUDAL; PERINEURAL
Status: DISPENSED
Start: 2019-12-05

## (undated) RX ORDER — HYDROMORPHONE HYDROCHLORIDE 1 MG/ML
INJECTION, SOLUTION INTRAMUSCULAR; INTRAVENOUS; SUBCUTANEOUS
Status: DISPENSED
Start: 2023-08-29

## (undated) RX ORDER — LIDOCAINE HYDROCHLORIDE 20 MG/ML
SOLUTION OROPHARYNGEAL
Status: DISPENSED
Start: 2019-12-05

## (undated) RX ORDER — HEPARIN SODIUM 1000 [USP'U]/ML
INJECTION, SOLUTION INTRAVENOUS; SUBCUTANEOUS
Status: DISPENSED
Start: 2024-06-21

## (undated) RX ORDER — FENTANYL CITRATE 50 UG/ML
INJECTION, SOLUTION INTRAMUSCULAR; INTRAVENOUS
Status: DISPENSED
Start: 2020-04-24

## (undated) RX ORDER — ENOXAPARIN SODIUM 100 MG/ML
INJECTION SUBCUTANEOUS
Status: DISPENSED
Start: 2023-08-29

## (undated) RX ORDER — FENTANYL CITRATE 50 UG/ML
INJECTION, SOLUTION INTRAMUSCULAR; INTRAVENOUS
Status: DISPENSED
Start: 2025-05-12

## (undated) RX ORDER — OXYCODONE HYDROCHLORIDE 5 MG/1
TABLET ORAL
Status: DISPENSED
Start: 2023-02-21

## (undated) RX ORDER — LIDOCAINE HYDROCHLORIDE 10 MG/ML
INJECTION, SOLUTION EPIDURAL; INFILTRATION; INTRACAUDAL; PERINEURAL
Status: DISPENSED
Start: 2020-04-22

## (undated) RX ORDER — EPINEPHRINE 1 MG/ML
INJECTION, SOLUTION, CONCENTRATE INTRAVENOUS
Status: DISPENSED
Start: 2021-02-26

## (undated) RX ORDER — FENTANYL CITRATE 50 UG/ML
INJECTION, SOLUTION INTRAMUSCULAR; INTRAVENOUS
Status: DISPENSED
Start: 2023-02-21

## (undated) RX ORDER — SODIUM CHLORIDE 9 MG/ML
INJECTION, SOLUTION INTRAVENOUS
Status: DISPENSED
Start: 2023-02-21

## (undated) RX ORDER — FENTANYL CITRATE 50 UG/ML
INJECTION, SOLUTION INTRAMUSCULAR; INTRAVENOUS
Status: DISPENSED
Start: 2019-12-05

## (undated) RX ORDER — FENTANYL CITRATE 50 UG/ML
INJECTION, SOLUTION INTRAMUSCULAR; INTRAVENOUS
Status: DISPENSED
Start: 2020-04-22

## (undated) RX ORDER — LIDOCAINE HYDROCHLORIDE 10 MG/ML
INJECTION, SOLUTION EPIDURAL; INFILTRATION; INTRACAUDAL; PERINEURAL
Status: DISPENSED
Start: 2023-02-27

## (undated) RX ORDER — ONDANSETRON 2 MG/ML
INJECTION INTRAMUSCULAR; INTRAVENOUS
Status: DISPENSED
Start: 2020-04-24

## (undated) RX ORDER — NITROGLYCERIN 5 MG/ML
VIAL (ML) INTRAVENOUS
Status: DISPENSED
Start: 2023-02-21

## (undated) RX ORDER — ONDANSETRON 4 MG/1
TABLET, FILM COATED ORAL
Status: DISPENSED
Start: 2023-08-29

## (undated) RX ORDER — PROPOFOL 10 MG/ML
INJECTION, EMULSION INTRAVENOUS
Status: DISPENSED
Start: 2019-12-05

## (undated) RX ORDER — FENTANYL CITRATE 50 UG/ML
INJECTION, SOLUTION INTRAMUSCULAR; INTRAVENOUS
Status: DISPENSED
Start: 2023-01-06

## (undated) RX ORDER — GABAPENTIN 300 MG/1
CAPSULE ORAL
Status: DISPENSED
Start: 2021-02-26

## (undated) RX ORDER — ONDANSETRON 2 MG/ML
INJECTION INTRAMUSCULAR; INTRAVENOUS
Status: DISPENSED
Start: 2019-12-05

## (undated) RX ORDER — PHENYLEPHRINE HCL IN 0.9% NACL 1 MG/10 ML
SYRINGE (ML) INTRAVENOUS
Status: DISPENSED
Start: 2019-12-05

## (undated) RX ORDER — FENTANYL CITRATE 50 UG/ML
INJECTION, SOLUTION INTRAMUSCULAR; INTRAVENOUS
Status: DISPENSED
Start: 2021-02-26

## (undated) RX ORDER — LIDOCAINE HYDROCHLORIDE AND EPINEPHRINE 10; 10 MG/ML; UG/ML
INJECTION, SOLUTION INFILTRATION; PERINEURAL
Status: DISPENSED
Start: 2023-01-06

## (undated) RX ORDER — FENTANYL CITRATE 50 UG/ML
INJECTION, SOLUTION INTRAMUSCULAR; INTRAVENOUS
Status: DISPENSED
Start: 2023-08-29

## (undated) RX ORDER — ALBUTEROL SULFATE 0.83 MG/ML
SOLUTION RESPIRATORY (INHALATION)
Status: DISPENSED
Start: 2024-10-03

## (undated) RX ORDER — MORPHINE SULFATE 2 MG/ML
INJECTION, SOLUTION INTRAMUSCULAR; INTRAVENOUS
Status: DISPENSED
Start: 2020-04-22

## (undated) RX ORDER — BUPIVACAINE HYDROCHLORIDE 2.5 MG/ML
INJECTION, SOLUTION EPIDURAL; INFILTRATION; INTRACAUDAL
Status: DISPENSED
Start: 2021-02-26

## (undated) RX ORDER — PROPOFOL 10 MG/ML
INJECTION, EMULSION INTRAVENOUS
Status: DISPENSED
Start: 2023-08-29

## (undated) RX ORDER — ONDANSETRON 2 MG/ML
INJECTION INTRAMUSCULAR; INTRAVENOUS
Status: DISPENSED
Start: 2023-02-21

## (undated) RX ORDER — DEXAMETHASONE SODIUM PHOSPHATE 4 MG/ML
INJECTION, SOLUTION INTRA-ARTICULAR; INTRALESIONAL; INTRAMUSCULAR; INTRAVENOUS; SOFT TISSUE
Status: DISPENSED
Start: 2019-12-05

## (undated) RX ORDER — FENTANYL CITRATE 50 UG/ML
INJECTION, SOLUTION INTRAMUSCULAR; INTRAVENOUS
Status: DISPENSED
Start: 2023-02-27

## (undated) RX ORDER — OXYCODONE HYDROCHLORIDE 10 MG/1
TABLET ORAL
Status: DISPENSED
Start: 2023-08-29

## (undated) RX ORDER — PANTOPRAZOLE SODIUM 40 MG/10ML
INJECTION, POWDER, LYOPHILIZED, FOR SOLUTION INTRAVENOUS
Status: DISPENSED
Start: 2023-02-21

## (undated) RX ORDER — HYDROMORPHONE HYDROCHLORIDE 1 MG/ML
INJECTION, SOLUTION INTRAMUSCULAR; INTRAVENOUS; SUBCUTANEOUS
Status: DISPENSED
Start: 2019-03-28

## (undated) RX ORDER — ONDANSETRON 2 MG/ML
INJECTION INTRAMUSCULAR; INTRAVENOUS
Status: DISPENSED
Start: 2023-01-06

## (undated) RX ORDER — CEFAZOLIN SODIUM/WATER 2 G/20 ML
SYRINGE (ML) INTRAVENOUS
Status: DISPENSED
Start: 2023-08-29

## (undated) RX ORDER — HYDROMORPHONE HYDROCHLORIDE 1 MG/ML
INJECTION, SOLUTION INTRAMUSCULAR; INTRAVENOUS; SUBCUTANEOUS
Status: DISPENSED
Start: 2024-06-22

## (undated) RX ORDER — ONDANSETRON 4 MG/1
TABLET, ORALLY DISINTEGRATING ORAL
Status: DISPENSED
Start: 2023-08-29

## (undated) RX ORDER — PANTOPRAZOLE SODIUM 40 MG/10ML
INJECTION, POWDER, LYOPHILIZED, FOR SOLUTION INTRAVENOUS
Status: DISPENSED
Start: 2023-02-27

## (undated) RX ORDER — VERAPAMIL HYDROCHLORIDE 2.5 MG/ML
INJECTION, SOLUTION INTRAVENOUS
Status: DISPENSED
Start: 2024-06-21

## (undated) RX ORDER — IOPAMIDOL 510 MG/ML
INJECTION, SOLUTION INTRAVASCULAR
Status: DISPENSED
Start: 2020-04-24

## (undated) RX ORDER — FENTANYL CITRATE 50 UG/ML
INJECTION, SOLUTION INTRAMUSCULAR; INTRAVENOUS
Status: DISPENSED
Start: 2023-05-11

## (undated) RX ORDER — PENTAMIDINE ISETHIONATE 300 MG/300MG
INHALANT RESPIRATORY (INHALATION)
Status: DISPENSED
Start: 2024-08-05

## (undated) RX ORDER — METOPROLOL TARTRATE 50 MG
TABLET ORAL
Status: DISPENSED
Start: 2023-03-27

## (undated) RX ORDER — ONDANSETRON 2 MG/ML
INJECTION INTRAMUSCULAR; INTRAVENOUS
Status: DISPENSED
Start: 2023-02-27

## (undated) RX ORDER — BUPIVACAINE HYDROCHLORIDE AND EPINEPHRINE 5; 5 MG/ML; UG/ML
INJECTION, SOLUTION EPIDURAL; INTRACAUDAL; PERINEURAL
Status: DISPENSED
Start: 2021-02-26

## (undated) RX ORDER — PROPOFOL 10 MG/ML
INJECTION, EMULSION INTRAVENOUS
Status: DISPENSED
Start: 2023-01-06